# Patient Record
Sex: FEMALE | Race: BLACK OR AFRICAN AMERICAN | Employment: OTHER | URBAN - METROPOLITAN AREA
[De-identification: names, ages, dates, MRNs, and addresses within clinical notes are randomized per-mention and may not be internally consistent; named-entity substitution may affect disease eponyms.]

---

## 2017-01-14 ENCOUNTER — HOSPITAL ENCOUNTER (EMERGENCY)
Age: 47
Discharge: HOME | End: 2017-01-15
Attending: EMERGENCY MEDICINE
Payer: MEDICARE

## 2017-01-14 DIAGNOSIS — J44.1 COPD EXACERBATION (HCC): Primary | ICD-10-CM

## 2017-01-14 DIAGNOSIS — R07.9 ACUTE CHEST PAIN: ICD-10-CM

## 2017-01-14 DIAGNOSIS — J20.9 ACUTE BRONCHITIS, UNSPECIFIED ORGANISM: ICD-10-CM

## 2017-01-14 PROCEDURE — 99285 EMERGENCY DEPT VISIT HI MDM: CPT

## 2017-01-15 ENCOUNTER — APPOINTMENT (OUTPATIENT)
Dept: GENERAL RADIOLOGY | Age: 47
End: 2017-01-15
Attending: EMERGENCY MEDICINE
Payer: MEDICARE

## 2017-01-15 VITALS
HEIGHT: 67 IN | SYSTOLIC BLOOD PRESSURE: 104 MMHG | BODY MASS INDEX: 45.99 KG/M2 | TEMPERATURE: 98 F | OXYGEN SATURATION: 95 % | RESPIRATION RATE: 16 BRPM | WEIGHT: 293 LBS | DIASTOLIC BLOOD PRESSURE: 53 MMHG | HEART RATE: 91 BPM

## 2017-01-15 LAB
ALBUMIN SERPL BCP-MCNC: 3.1 G/DL (ref 3.5–5)
ALBUMIN/GLOB SERPL: 0.8 {RATIO} (ref 1.1–2.2)
ALP SERPL-CCNC: 86 U/L (ref 45–117)
ALT SERPL-CCNC: 33 U/L (ref 12–78)
ANION GAP BLD CALC-SCNC: 7 MMOL/L (ref 5–15)
AST SERPL W P-5'-P-CCNC: 12 U/L (ref 15–37)
BASOPHILS # BLD AUTO: 0 K/UL (ref 0–0.1)
BASOPHILS # BLD: 0 % (ref 0–1)
BILIRUB SERPL-MCNC: 0.4 MG/DL (ref 0.2–1)
BNP SERPL-MCNC: 175 PG/ML (ref 0–125)
BUN SERPL-MCNC: 26 MG/DL (ref 6–20)
BUN/CREAT SERPL: 35 (ref 12–20)
CALCIUM SERPL-MCNC: 10.1 MG/DL (ref 8.5–10.1)
CHLORIDE SERPL-SCNC: 101 MMOL/L (ref 97–108)
CK SERPL-CCNC: 29 U/L (ref 26–192)
CO2 SERPL-SCNC: 29 MMOL/L (ref 21–32)
CREAT SERPL-MCNC: 0.75 MG/DL (ref 0.55–1.02)
EOSINOPHIL # BLD: 0.1 K/UL (ref 0–0.4)
EOSINOPHIL NFR BLD: 0 % (ref 0–7)
ERYTHROCYTE [DISTWIDTH] IN BLOOD BY AUTOMATED COUNT: 14.1 % (ref 11.5–14.5)
GLOBULIN SER CALC-MCNC: 4.1 G/DL (ref 2–4)
GLUCOSE SERPL-MCNC: 72 MG/DL (ref 65–100)
HCT VFR BLD AUTO: 46.3 % (ref 35–47)
HGB BLD-MCNC: 14.4 G/DL (ref 11.5–16)
LYMPHOCYTES # BLD AUTO: 9 % (ref 12–49)
LYMPHOCYTES # BLD: 1.6 K/UL (ref 0.8–3.5)
MCH RBC QN AUTO: 28.7 PG (ref 26–34)
MCHC RBC AUTO-ENTMCNC: 31.1 G/DL (ref 30–36.5)
MCV RBC AUTO: 92.4 FL (ref 80–99)
MONOCYTES # BLD: 1 K/UL (ref 0–1)
MONOCYTES NFR BLD AUTO: 5 % (ref 5–13)
NEUTS SEG # BLD: 15.4 K/UL (ref 1.8–8)
NEUTS SEG NFR BLD AUTO: 86 % (ref 32–75)
PLATELET # BLD AUTO: 211 K/UL (ref 150–400)
POTASSIUM SERPL-SCNC: 4.6 MMOL/L (ref 3.5–5.1)
PROT SERPL-MCNC: 7.2 G/DL (ref 6.4–8.2)
RBC # BLD AUTO: 5.01 M/UL (ref 3.8–5.2)
SODIUM SERPL-SCNC: 137 MMOL/L (ref 136–145)
TROPONIN I SERPL-MCNC: <0.04 NG/ML
WBC # BLD AUTO: 18 K/UL (ref 3.6–11)

## 2017-01-15 PROCEDURE — 83880 ASSAY OF NATRIURETIC PEPTIDE: CPT | Performed by: EMERGENCY MEDICINE

## 2017-01-15 PROCEDURE — 93005 ELECTROCARDIOGRAM TRACING: CPT

## 2017-01-15 PROCEDURE — 74011250637 HC RX REV CODE- 250/637: Performed by: EMERGENCY MEDICINE

## 2017-01-15 PROCEDURE — A9270 NON-COVERED ITEM OR SERVICE: HCPCS | Performed by: EMERGENCY MEDICINE

## 2017-01-15 PROCEDURE — 74011636637 HC RX REV CODE- 636/637: Performed by: EMERGENCY MEDICINE

## 2017-01-15 PROCEDURE — 80053 COMPREHEN METABOLIC PANEL: CPT | Performed by: EMERGENCY MEDICINE

## 2017-01-15 PROCEDURE — 82550 ASSAY OF CK (CPK): CPT | Performed by: EMERGENCY MEDICINE

## 2017-01-15 PROCEDURE — 71020 XR CHEST PA LAT: CPT

## 2017-01-15 PROCEDURE — 85025 COMPLETE CBC W/AUTO DIFF WBC: CPT | Performed by: EMERGENCY MEDICINE

## 2017-01-15 PROCEDURE — 84484 ASSAY OF TROPONIN QUANT: CPT | Performed by: EMERGENCY MEDICINE

## 2017-01-15 PROCEDURE — 36415 COLL VENOUS BLD VENIPUNCTURE: CPT | Performed by: EMERGENCY MEDICINE

## 2017-01-15 RX ORDER — DOXYCYCLINE HYCLATE 100 MG
100 TABLET ORAL
Status: COMPLETED | OUTPATIENT
Start: 2017-01-15 | End: 2017-01-15

## 2017-01-15 RX ORDER — PREDNISONE 20 MG/1
60 TABLET ORAL
Status: COMPLETED | OUTPATIENT
Start: 2017-01-15 | End: 2017-01-15

## 2017-01-15 RX ORDER — HYDROCODONE POLISTIREX AND CHLORPHENIRAMINE POLISTIREX 10; 8 MG/5ML; MG/5ML
5 SUSPENSION, EXTENDED RELEASE ORAL
Status: COMPLETED | OUTPATIENT
Start: 2017-01-15 | End: 2017-01-15

## 2017-01-15 RX ORDER — OXYCODONE AND ACETAMINOPHEN 5; 325 MG/1; MG/1
1 TABLET ORAL
Status: DISCONTINUED | OUTPATIENT
Start: 2017-01-15 | End: 2017-01-15

## 2017-01-15 RX ORDER — DOXYCYCLINE HYCLATE 100 MG
100 TABLET ORAL 2 TIMES DAILY
Qty: 14 TAB | Refills: 0 | Status: SHIPPED | OUTPATIENT
Start: 2017-01-15 | End: 2017-01-22

## 2017-01-15 RX ORDER — HYDROCODONE POLISTIREX AND CHLORPHENIRAMINE POLISTIREX 10; 8 MG/5ML; MG/5ML
5 SUSPENSION, EXTENDED RELEASE ORAL
Qty: 60 ML | Refills: 0 | Status: SHIPPED | OUTPATIENT
Start: 2017-01-15 | End: 2017-06-11

## 2017-01-15 RX ORDER — PREDNISONE 20 MG/1
20 TABLET ORAL DAILY
Qty: 5 TAB | Refills: 0 | Status: SHIPPED | OUTPATIENT
Start: 2017-01-16 | End: 2017-01-21

## 2017-01-15 RX ADMIN — PREDNISONE 60 MG: 20 TABLET ORAL at 02:31

## 2017-01-15 RX ADMIN — HYDROCODONE POLISTIREX AND CHLORPHENIRAMINE POLISTIREX 5 ML: 10; 8 SUSPENSION, EXTENDED RELEASE ORAL at 02:31

## 2017-01-15 RX ADMIN — DOXYCYCLINE HYCLATE 100 MG: 100 TABLET, COATED ORAL at 02:31

## 2017-01-15 NOTE — DISCHARGE INSTRUCTIONS
Bronchitis: Care Instructions  Your Care Instructions    Bronchitis is inflammation of the bronchial tubes, which carry air to the lungs. The tubes swell and produce mucus, or phlegm. The mucus and inflamed bronchial tubes make you cough. You may have trouble breathing. Most cases of bronchitis are caused by viruses like those that cause colds. Antibiotics usually do not help and they may be harmful. Bronchitis usually develops rapidly and lasts about 2 to 3 weeks in otherwise healthy people. Follow-up care is a key part of your treatment and safety. Be sure to make and go to all appointments, and call your doctor if you are having problems. It's also a good idea to know your test results and keep a list of the medicines you take. How can you care for yourself at home? · Take all medicines exactly as prescribed. Call your doctor if you think you are having a problem with your medicine. · Get some extra rest.  · Take an over-the-counter pain medicine, such as acetaminophen (Tylenol), ibuprofen (Advil, Motrin), or naproxen (Aleve) to reduce fever and relieve body aches. Read and follow all instructions on the label. · Do not take two or more pain medicines at the same time unless the doctor told you to. Many pain medicines have acetaminophen, which is Tylenol. Too much acetaminophen (Tylenol) can be harmful. · Take an over-the-counter cough medicine that contains dextromethorphan to help quiet a dry, hacking cough so that you can sleep. Avoid cough medicines that have more than one active ingredient. Read and follow all instructions on the label. · Breathe moist air from a humidifier, hot shower, or sink filled with hot water. The heat and moisture will thin mucus so you can cough it out. · Do not smoke. Smoking can make bronchitis worse. If you need help quitting, talk to your doctor about stop-smoking programs and medicines. These can increase your chances of quitting for good.   When should you call for help? Call 911 anytime you think you may need emergency care. For example, call if:  · You have severe trouble breathing. Call your doctor now or seek immediate medical care if:  · You have new or worse trouble breathing. · You cough up dark brown or bloody mucus (sputum). · You have a new or higher fever. · You have a new rash. Watch closely for changes in your health, and be sure to contact your doctor if:  · You cough more deeply or more often, especially if you notice more mucus or a change in the color of your mucus. · You are not getting better as expected. Where can you learn more? Go to http://cassia-donte.info/. Enter H333 in the search box to learn more about \"Bronchitis: Care Instructions. \"  Current as of: May 23, 2016  Content Version: 11.1  © 3985-6803 [employer]. Care instructions adapted under license by [employer] (which disclaims liability or warranty for this information). If you have questions about a medical condition or this instruction, always ask your healthcare professional. [address] any warranty or liability for your use of this information. Chest Pain: Care Instructions  Your Care Instructions  There are many things that can cause chest pain. Some are not serious and will get better on their own in a few days. But some kinds of chest pain need more testing and treatment. Your doctor may have recommended a follow-up visit in the next 8 to 12 hours. If you are not getting better, you may need more tests or treatment. Even though your doctor has released you, you still need to watch for any problems. The doctor carefully checked you, but sometimes problems can develop later. If you have new symptoms or if your symptoms do not get better, get medical care right away.   If you have worse or different chest pain or pressure that lasts more than 5 minutes or you passed out (lost consciousness), call 911 or seek other emergency help right away. A medical visit is only one step in your treatment. Even if you feel better, you still need to do what your doctor recommends, such as going to all suggested follow-up appointments and taking medicines exactly as directed. This will help you recover and help prevent future problems. How can you care for yourself at home? · Rest until you feel better. · Take your medicine exactly as prescribed. Call your doctor if you think you are having a problem with your medicine. · Do not drive after taking a prescription pain medicine. When should you call for help? Call 911 if:  · You passed out (lost consciousness). · You have severe difficulty breathing. · You have symptoms of a heart attack. These may include:  ¨ Chest pain or pressure, or a strange feeling in your chest.  ¨ Sweating. ¨ Shortness of breath. ¨ Nausea or vomiting. ¨ Pain, pressure, or a strange feeling in your back, neck, jaw, or upper belly or in one or both shoulders or arms. ¨ Lightheadedness or sudden weakness. ¨ A fast or irregular heartbeat. After you call 911, the  may tell you to chew 1 adult-strength or 2 to 4 low-dose aspirin. Wait for an ambulance. Do not try to drive yourself. Call your doctor today if:  · You have any trouble breathing. · Your chest pain gets worse. · You are dizzy or lightheaded, or you feel like you may faint. · You are not getting better as expected. · You are having new or different chest pain. Where can you learn more? Go to http://cassia-donte.info/. Enter A120 in the search box to learn more about \"Chest Pain: Care Instructions. \"  Current as of: May 27, 2016  Content Version: 11.1  © 2258-0778 [email or web address]. Care instructions adapted under license by [employer] (which disclaims liability or warranty for this information).  If you have questions about a medical condition or this instruction, always ask your healthcare professional. [address] any warranty or liability for your use of this information.

## 2017-01-15 NOTE — ED PROVIDER NOTES
HPI Comments: Estelle Rocha is a 42 y.o. female with PMHx significant for asthma,CHF,COPD,MI,DM who presents via EMS to the ED with cc of SOB x 2 days. She also c/o left sided chest pain (that has recently shifted to the right), left arm tightness, diaphoresis, productive cough with white sputum production and wheezes. Pt reports that she thought her sxs were originally due to indigestion as she has been burping and passing gas frequently. She notes that she recently had a URI from which she is still recovering. She also notes chronic swelling of her bilateral lower extremities but states that this is at baseline and has not worsened. Pt states that a breathing treatment en route has already helped her breathing. She denies any fever, N/V/D.    PCP: None    Social Hx: +tobacco (1/2 PPD), -EtOH, -Illicit Drugs    There are no other complaints, changes, or physical findings at this time. The history is provided by the patient. Past Medical History:   Diagnosis Date    Arthritis     Asthma     CAD (coronary artery disease)     Congestive heart failure (HCC)     COPD (chronic obstructive pulmonary disease) ([address]     Diabetes ([address]     Hypertension     Morbid obesity with BMI of 70 and over, adult ([address]     NSTEMI (non-ST elevated myocardial infarction) ([address]     SVT (supraventricular tachycardia)        Past Surgical History:   Procedure Laterality Date    Hx orthopaedic      Hx other surgical       cyst removed from back    Hx  section      Pr cardiac surg procedure unlist       Stents         Family History:   Problem Relation Age of Onset    Heart Disease Mother     Heart Disease Father     Heart Disease Sister        Social History     Social History    Marital status: SINGLE     Spouse name: N/A    Number of children: N/A    Years of education: N/A     Occupational History    Not on file.      Social History Main Topics    Smoking status: Current Every Day Smoker Packs/day: 0.50     Types: Cigarettes    Smokeless tobacco: Never Used    Alcohol use No    Drug use: No    Sexual activity: Not on file     Other Topics Concern    Not on file     Social History Narrative    ** Merged History Encounter **              ALLERGIES: Review of patient's allergies indicates no known allergies. Review of Systems   Constitutional: Positive for diaphoresis. HENT: Negative for congestion. Eyes: Negative. Respiratory: Positive for cough, shortness of breath and wheezing. Cardiovascular: Positive for chest pain. Gastrointestinal: Negative for diarrhea, nausea and vomiting. Endocrine: Negative for heat intolerance. Genitourinary: Negative for dysuria. Musculoskeletal: Positive for myalgias (left arm). Skin: Negative for rash. Allergic/Immunologic: Negative for immunocompromised state. Neurological: Negative for dizziness. Hematological: Does not bruise/bleed easily. Psychiatric/Behavioral: Negative. All other systems reviewed and are negative. Vitals:    01/14/17 2338 01/14/17 2345 01/15/17 0030   BP: 113/77 113/77 104/53   Pulse: 91  91   Resp: 16     Temp: 98 °F (36.7 °C)     SpO2: 92% 92% 95%   Weight: (!) 201.9 kg (445 lb)     Height: 5' 7\" (1.702 m)              Physical Exam   Constitutional: She is oriented to person, place, and time. She appears well-developed and well-nourished. No distress. Severely elevated BMI    HENT:   Head: Normocephalic and atraumatic. Eyes: EOM are normal.   Neck: Normal range of motion. Neck supple. Cardiovascular: Normal rate, regular rhythm and normal heart sounds. Pulmonary/Chest: Effort normal. No respiratory distress. She has wheezes. She exhibits tenderness (reproducible). Mild scattered wheezes    Abdominal: Soft. Bowel sounds are normal. She exhibits no mass. There is no tenderness. Musculoskeletal: Normal range of motion. She exhibits edema and tenderness. Upper back tenderness.  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 Neurological: She is alert and oriented to person, place, and time. Coordination normal.   Skin: Skin is warm and dry. Psychiatric: She has a normal mood and affect. Nursing note and vitals reviewed. MDM  Number of Diagnoses or Management Options  Acute bronchitis, unspecified organism:   Acute chest pain:   COPD exacerbation [facility]):   Diagnosis management comments: Ddx: COPD, asthma, PNA, CHF, costochondritis, CAD       Amount and/or Complexity of Data Reviewed  Clinical lab tests: ordered and reviewed  Tests in the radiology section of CPT®: ordered and reviewed  Tests in the medicine section of CPT®: ordered and reviewed  Review and summarize past medical records: yes  Independent visualization of images, tracings, or specimens: yes    Patient Progress  Patient progress: stable    ED Course       Procedures    EKG interpretation: (Preliminary) 0032  Rhythm: normal sinus rhythm; and regular . Rate (approx.): 87; Axis: normal; IN interval: normal; QRS interval: low voltage QRS; ST/T wave: normal;  Other findings: no significant changes.     LABORATORY TESTS:  Recent Results (from the past 12 hour(s))   EKG, 12 LEAD, INITIAL    Collection Time: 01/15/17 12:32 AM   Result Value Ref Range    Ventricular Rate 87 BPM    Atrial Rate 87 BPM    P-R Interval 180 ms    QRS Duration 90 ms    Q-T Interval 344 ms    QTC Calculation (Bezet) 413 ms    Calculated P Axis 52 degrees    Calculated R Axis 86 degrees    Calculated T Axis 47 degrees    Diagnosis       Normal sinus rhythm  Low voltage QRS  When compared with ECG of 25-NOV-2016 20:38,  T wave amplitude has increased in Anterior leads     TROPONIN I    Collection Time: 01/15/17  1:05 AM   Result Value Ref Range    Troponin-I, Qt. <0.04 <0.05 ng/mL   CK W/ REFLX CKMB    Collection Time: 01/15/17  1:05 AM   Result Value Ref Range    CK 29 26 - 192 U/L   CBC WITH AUTOMATED DIFF    Collection Time: 01/15/17  1:05 AM   Result Value Ref Range    WBC 18.0 (H) 3.6 - 11.0 K/uL    RBC 5.01 3.80 - 5.20 M/uL    HGB 14.4 11.5 - 16.0 g/dL    HCT 46.3 35.0 - 47.0 %    MCV 92.4 80.0 - 99.0 FL    MCH 28.7 26.0 - 34.0 PG    MCHC 31.1 30.0 - 36.5 g/dL    RDW 14.1 11.5 - 14.5 %    PLATELET 850 779 - 855 K/uL    NEUTROPHILS 86 (H) 32 - 75 %    LYMPHOCYTES 9 (L) 12 - 49 %    MONOCYTES 5 5 - 13 %    EOSINOPHILS 0 0 - 7 %    BASOPHILS 0 0 - 1 %    ABS. NEUTROPHILS 15.4 (H) 1.8 - 8.0 K/UL    ABS. LYMPHOCYTES 1.6 0.8 - 3.5 K/UL    ABS. MONOCYTES 1.0 0.0 - 1.0 K/UL    ABS. EOSINOPHILS 0.1 0.0 - 0.4 K/UL    ABS. BASOPHILS 0.0 0.0 - 0.1 K/UL   METABOLIC PANEL, COMPREHENSIVE    Collection Time: 01/15/17  1:05 AM   Result Value Ref Range    Sodium 137 136 - 145 mmol/L    Potassium 4.6 3.5 - 5.1 mmol/L    Chloride 101 97 - 108 mmol/L    CO2 29 21 - 32 mmol/L    Anion gap 7 5 - 15 mmol/L    Glucose 72 65 - 100 mg/dL    BUN 26 (H) 6 - 20 MG/DL    Creatinine 0.75 0.55 - 1.02 MG/DL    BUN/Creatinine ratio 35 (H) 12 - 20      GFR est AA >60 >60 ml/min/1.73m2    GFR est non-AA >60 >60 ml/min/1.73m2    Calcium 10.1 8.5 - 10.1 MG/DL    Bilirubin, total 0.4 0.2 - 1.0 MG/DL    ALT 33 12 - 78 U/L    AST 12 (L) 15 - 37 U/L    Alk. phosphatase 86 45 - 117 U/L    Protein, total 7.2 6.4 - 8.2 g/dL    Albumin 3.1 (L) 3.5 - 5.0 g/dL    Globulin 4.1 (H) 2.0 - 4.0 g/dL    A-G Ratio 0.8 (L) 1.1 - 2.2     PRO-BNP    Collection Time: 01/15/17  1:05 AM   Result Value Ref Range    NT pro- (H) 0 - 125 PG/ML       IMAGING RESULTS:  XR CHEST PA LAT   Final ResultEXAM: XR CHEST PA LAT     INDICATION: Shortness of breath for a few days     COMPARISON: 11/25/2016.     FINDINGS: PA and lateral radiographs of the chest demonstrate there is a linear  density at the right lung base which may be scarring or atelectasis. Lungs are  otherwise clear. Thony Shillings Heart size is stable at the upper limits of normal.  Mediastinal shadows stable. . The bones and soft tissues are within normal  limits.    IMPRESSION  IMPRESSION: No acute finding             MEDICATIONS GIVEN:  Medications   predniSONE (DELTASONE) tablet 60 mg (60 mg Oral Given 1/15/17 0231)   chlorpheniramine-HYDROcodone (TUSSIONEX) oral suspension 5 mL (5 mL Oral Given 1/15/17 0231)   doxycycline (VIBRA-TABS) tablet 100 mg (100 mg Oral Given 1/15/17 0231)       IMPRESSION:  1. COPD exacerbation ([address]    2. Acute bronchitis, unspecified organism    3. Acute chest pain        PLAN:  1. Discharge Medication List as of 1/15/2017  3:20 AM      START taking these medications    Details   doxycycline (VIBRA-TABS) 100 mg tablet Take 1 Tab by mouth two (2) times a day for 7 days. , Normal, Disp-14 Tab, R-0      chlorpheniramine-HYDROcodone (TUSSIONEX PENNKINETIC ER) 10-8 mg/5 mL suspension Take 5 mL by mouth every twelve (12) hours as needed for Cough. Max Daily Amount: 10 mL., Print, Disp-60 mL, R-0      predniSONE (DELTASONE) 20 mg tablet Take 1 Tab by mouth daily for 5 days. With Breakfast, Normal, Disp-5 Tab, R-0         CONTINUE these medications which have NOT CHANGED    Details   furosemide (LASIX) 40 mg tablet Take 40 mg by mouth daily. , Historical Med      lovastatin (MEVACOR) 40 mg tablet Take 1 Tab by mouth nightly., Normal, Disp-30 Tab, R-6      albuterol (ACCUNEB) 1.25 mg/3 mL nebu Take 3 mL by inhalation every four (4) hours as needed. , Print, Disp-100 Each, R-1      amLODIPine (NORVASC) 5 mg tablet Take 0.5 Tabs by mouth daily. , Print, Disp-30 Tab, R-1      clotrimazole (LOTRIMIN) 1 % topical cream Apply to the affected areas twice daily until healed, Print, Disp-15 g, R-0      lisinopril (PRINIVIL, ZESTRIL) 40 mg tablet Take 40 mg by mouth daily. , Historical Med      glyBURIDE (DIABETA) 5 mg tablet Take 5 mg by mouth Daily (before breakfast). , Historical Med      benzonatate (TESSALON) 200 mg capsule Take 200 mg by mouth three (3) times daily as needed for Cough., Historical Med      cetirizine (ZYRTEC) 10 mg tablet Take 10 mg by mouth daily as needed for Allergies. , Historical Med      nystatin (MYCOSTATIN) powder Apply  to affected area three (3) times daily. APPLY TO Bilater breasts, pannus, groin after her bath and then two other times during the day., Print, Disp-1 Bottle, R-0      medroxyPROGESTERone (DEPO-PROVERA) 150 mg/mL injection 150 mg., Historical Med      omeprazole (PRILOSEC) 40 mg capsule Take 40 mg by mouth daily. , Historical Med      ketoconazole (NIZORAL) 2 % topical cream Apply  to affected area daily. , Historical Med      ammonium lactate (LAC-HYDRIN) 12 % topical cream rub in to affected area well, Print, Disp-280 g, R-1      aspirin delayed-release 81 mg tablet Take 81 mg by mouth daily. , Historical Med      metoprolol (LOPRESSOR) 25 mg tablet Take 25 mg by mouth two (2) times a day., Historical Med      nitroglycerin (NITROSTAT) 0.4 mg SL tablet 1 Tab by SubLINGual route every five (5) minutes as needed for Chest Pain (call 911 if not relieved by 3). , Normal, Disp-25 Tab, R-2      albuterol (PROVENTIL, VENTOLIN) 90 mcg/actuation inhaler Take 1-2 Puffs by inhalation every four (4) hours as needed for Wheezing., Print, Disp-17 g, R-0      cyclobenzaprine (FLEXERIL) 10 mg tablet Take 10 mg by mouth three (3) times daily as needed., Historical Med         STOP taking these medications       HYDROmorphone (DILAUDID) 2 mg tablet Comments:   Reason for Stoppin.   Follow-up Information     Follow up With Details Comments Contact Info    see a PCP or clinic Dr In 2 days As needed     [facility] EMERGENCY DEPT  If symptoms worsen [address] [facility] [address] Aditi Saleh MD In 2 days As needed [address]  [address]           Return to ED if worse     DISCHARGE NOTE  3:30 AM  The patient has been re-evaluated and is ready for discharge. Reviewed available results with patient. Counseled pt on diagnosis and care plan.  Pt has expressed understanding, and all questions have been answered. Pt agrees with plan and agrees to F/U as recommended, or return to the ED if their sxs worsen. Discharge instructions have been provided and explained to the pt, along with reasons to return to the ED. Written by Elvia Angela, ED Scribe, as dictated by David Keita MD.    This note is prepared by Elvia Angela, acting as Scribe for David Keita MD.    David Keita MD: The scribe's documentation has been prepared under my direction and personally reviewed by me in its entirety. I confirm that the note above accurately reflects all work, treatment, procedures, and medical decision making performed by me.

## 2017-01-15 NOTE — ED NOTES
Pt discharged with written instructions and prescriptions at this time. Pt verbalizes understanding and all questions were answered. Discharged by Patti Murphy, in stable condition, via [facility].

## 2017-01-16 LAB
ATRIAL RATE: 87 BPM
CALCULATED P AXIS, ECG09: 52 DEGREES
CALCULATED R AXIS, ECG10: 86 DEGREES
CALCULATED T AXIS, ECG11: 47 DEGREES
DIAGNOSIS, 93000: NORMAL
P-R INTERVAL, ECG05: 180 MS
Q-T INTERVAL, ECG07: 344 MS
QRS DURATION, ECG06: 90 MS
QTC CALCULATION (BEZET), ECG08: 413 MS
VENTRICULAR RATE, ECG03: 87 BPM

## 2017-06-11 ENCOUNTER — HOSPITAL ENCOUNTER (EMERGENCY)
Age: 47
Discharge: HOME | End: 2017-06-12
Attending: EMERGENCY MEDICINE | Admitting: EMERGENCY MEDICINE
Payer: MEDICARE

## 2017-06-11 ENCOUNTER — APPOINTMENT (OUTPATIENT)
Dept: GENERAL RADIOLOGY | Age: 47
End: 2017-06-11
Attending: EMERGENCY MEDICINE
Payer: MEDICARE

## 2017-06-11 DIAGNOSIS — J44.1 ACUTE EXACERBATION OF CHRONIC OBSTRUCTIVE PULMONARY DISEASE (COPD) (HCC): ICD-10-CM

## 2017-06-11 DIAGNOSIS — R06.09 DYSPNEA ON EXERTION: Primary | ICD-10-CM

## 2017-06-11 LAB
ALBUMIN SERPL BCP-MCNC: 3.1 G/DL (ref 3.5–5)
ALBUMIN/GLOB SERPL: 0.6 {RATIO} (ref 1.1–2.2)
ALP SERPL-CCNC: 107 U/L (ref 45–117)
ALT SERPL-CCNC: 17 U/L (ref 12–78)
ANION GAP BLD CALC-SCNC: 5 MMOL/L (ref 5–15)
ARTERIAL PATENCY WRIST A: YES
AST SERPL W P-5'-P-CCNC: 11 U/L (ref 15–37)
BASE EXCESS BLDA CALC-SCNC: 4.1 MMOL/L
BASOPHILS # BLD AUTO: 0 K/UL (ref 0–0.1)
BASOPHILS # BLD: 0 % (ref 0–1)
BDY SITE: ABNORMAL
BILIRUB SERPL-MCNC: 0.4 MG/DL (ref 0.2–1)
BNP SERPL-MCNC: 190 PG/ML (ref 0–125)
BREATHS.SPONTANEOUS ON VENT: 22
BUN SERPL-MCNC: 14 MG/DL (ref 6–20)
BUN/CREAT SERPL: 16 (ref 12–20)
CALCIUM SERPL-MCNC: 10.6 MG/DL (ref 8.5–10.1)
CHLORIDE SERPL-SCNC: 102 MMOL/L (ref 97–108)
CK SERPL-CCNC: 27 U/L (ref 26–192)
CO2 SERPL-SCNC: 30 MMOL/L (ref 21–32)
CREAT SERPL-MCNC: 0.85 MG/DL (ref 0.55–1.02)
EOSINOPHIL # BLD: 0.3 K/UL (ref 0–0.4)
EOSINOPHIL NFR BLD: 2 % (ref 0–7)
ERYTHROCYTE [DISTWIDTH] IN BLOOD BY AUTOMATED COUNT: 13.8 % (ref 11.5–14.5)
GAS FLOW.O2 O2 DELIVERY SYS: 6 L/MIN
GLOBULIN SER CALC-MCNC: 5 G/DL (ref 2–4)
GLUCOSE BLD STRIP.AUTO-MCNC: 94 MG/DL (ref 65–100)
GLUCOSE SERPL-MCNC: 84 MG/DL (ref 65–100)
HCO3 BLDA-SCNC: 31 MMOL/L (ref 22–26)
HCT VFR BLD AUTO: 41.5 % (ref 35–47)
HGB BLD-MCNC: 13.3 G/DL (ref 11.5–16)
LYMPHOCYTES # BLD AUTO: 11 % (ref 12–49)
LYMPHOCYTES # BLD: 1.8 K/UL (ref 0.8–3.5)
MCH RBC QN AUTO: 30.9 PG (ref 26–34)
MCHC RBC AUTO-ENTMCNC: 32 G/DL (ref 30–36.5)
MCV RBC AUTO: 96.5 FL (ref 80–99)
MONOCYTES # BLD: 1.2 K/UL (ref 0–1)
MONOCYTES NFR BLD AUTO: 8 % (ref 5–13)
NEUTS SEG # BLD: 12.2 K/UL (ref 1.8–8)
NEUTS SEG NFR BLD AUTO: 79 % (ref 32–75)
PCO2 BLDA: 57 MMHG (ref 35–45)
PH BLDA: 7.36 [PH] (ref 7.35–7.45)
PLATELET # BLD AUTO: 216 K/UL (ref 150–400)
PO2 BLDA: 95 MMHG (ref 80–100)
POTASSIUM SERPL-SCNC: 4.1 MMOL/L (ref 3.5–5.1)
PROT SERPL-MCNC: 8.1 G/DL (ref 6.4–8.2)
RBC # BLD AUTO: 4.3 M/UL (ref 3.8–5.2)
SAO2 % BLD: 97 % (ref 92–97)
SAO2% DEVICE SAO2% SENSOR NAME: ABNORMAL
SERVICE CMNT-IMP: NORMAL
SODIUM SERPL-SCNC: 137 MMOL/L (ref 136–145)
SPECIMEN SITE: ABNORMAL
TROPONIN I SERPL-MCNC: <0.04 NG/ML
WBC # BLD AUTO: 15.5 K/UL (ref 3.6–11)

## 2017-06-11 PROCEDURE — 36600 WITHDRAWAL OF ARTERIAL BLOOD: CPT | Performed by: EMERGENCY MEDICINE

## 2017-06-11 PROCEDURE — 99285 EMERGENCY DEPT VISIT HI MDM: CPT

## 2017-06-11 PROCEDURE — 74011000250 HC RX REV CODE- 250: Performed by: EMERGENCY MEDICINE

## 2017-06-11 PROCEDURE — 82550 ASSAY OF CK (CPK): CPT | Performed by: EMERGENCY MEDICINE

## 2017-06-11 PROCEDURE — 93005 ELECTROCARDIOGRAM TRACING: CPT

## 2017-06-11 PROCEDURE — 71020 XR CHEST PA LAT: CPT

## 2017-06-11 PROCEDURE — 77030029684 HC NEB SM VOL KT MONA -A

## 2017-06-11 PROCEDURE — 83880 ASSAY OF NATRIURETIC PEPTIDE: CPT | Performed by: EMERGENCY MEDICINE

## 2017-06-11 PROCEDURE — 36415 COLL VENOUS BLD VENIPUNCTURE: CPT | Performed by: EMERGENCY MEDICINE

## 2017-06-11 PROCEDURE — 84484 ASSAY OF TROPONIN QUANT: CPT | Performed by: EMERGENCY MEDICINE

## 2017-06-11 PROCEDURE — 85025 COMPLETE CBC W/AUTO DIFF WBC: CPT | Performed by: EMERGENCY MEDICINE

## 2017-06-11 PROCEDURE — 94640 AIRWAY INHALATION TREATMENT: CPT

## 2017-06-11 PROCEDURE — 82962 GLUCOSE BLOOD TEST: CPT

## 2017-06-11 PROCEDURE — 82803 BLOOD GASES ANY COMBINATION: CPT | Performed by: EMERGENCY MEDICINE

## 2017-06-11 PROCEDURE — 80053 COMPREHEN METABOLIC PANEL: CPT | Performed by: EMERGENCY MEDICINE

## 2017-06-11 RX ORDER — IPRATROPIUM BROMIDE AND ALBUTEROL SULFATE 2.5; .5 MG/3ML; MG/3ML
3 SOLUTION RESPIRATORY (INHALATION) ONCE
Status: COMPLETED | OUTPATIENT
Start: 2017-06-11 | End: 2017-06-11

## 2017-06-11 RX ORDER — SODIUM CHLORIDE 0.9 % (FLUSH) 0.9 %
5-10 SYRINGE (ML) INJECTION AS NEEDED
Status: DISCONTINUED | OUTPATIENT
Start: 2017-06-11 | End: 2017-06-12 | Stop reason: HOSPADM

## 2017-06-11 RX ORDER — SODIUM CHLORIDE 0.9 % (FLUSH) 0.9 %
5-10 SYRINGE (ML) INJECTION EVERY 8 HOURS
Status: DISCONTINUED | OUTPATIENT
Start: 2017-06-11 | End: 2017-06-12 | Stop reason: HOSPADM

## 2017-06-11 RX ADMIN — IPRATROPIUM BROMIDE AND ALBUTEROL SULFATE 3 ML: .5; 3 SOLUTION RESPIRATORY (INHALATION) at 23:05

## 2017-06-12 VITALS
SYSTOLIC BLOOD PRESSURE: 106 MMHG | RESPIRATION RATE: 20 BRPM | BODY MASS INDEX: 47.09 KG/M2 | TEMPERATURE: 98.8 F | DIASTOLIC BLOOD PRESSURE: 58 MMHG | OXYGEN SATURATION: 95 % | HEIGHT: 66 IN | WEIGHT: 293 LBS | HEART RATE: 100 BPM

## 2017-06-12 LAB
ATRIAL RATE: 97 BPM
CALCULATED P AXIS, ECG09: 70 DEGREES
CALCULATED R AXIS, ECG10: 74 DEGREES
CALCULATED T AXIS, ECG11: 50 DEGREES
CK MB CFR SERPL CALC: NORMAL % (ref 0–2.5)
CK MB SERPL-MCNC: <1 NG/ML (ref 5–25)
CK SERPL-CCNC: 28 U/L (ref 26–192)
DIAGNOSIS, 93000: NORMAL
P-R INTERVAL, ECG05: 180 MS
Q-T INTERVAL, ECG07: 342 MS
QRS DURATION, ECG06: 84 MS
QTC CALCULATION (BEZET), ECG08: 434 MS
TROPONIN I SERPL-MCNC: <0.04 NG/ML
VENTRICULAR RATE, ECG03: 97 BPM

## 2017-06-12 PROCEDURE — 84484 ASSAY OF TROPONIN QUANT: CPT | Performed by: EMERGENCY MEDICINE

## 2017-06-12 PROCEDURE — 82550 ASSAY OF CK (CPK): CPT | Performed by: EMERGENCY MEDICINE

## 2017-06-12 RX ORDER — ALBUTEROL SULFATE 0.83 MG/ML
2.5 SOLUTION RESPIRATORY (INHALATION)
Qty: 24 EACH | Refills: 0 | Status: ON HOLD | OUTPATIENT
Start: 2017-06-12 | End: 2018-02-03

## 2017-06-12 RX ORDER — AZITHROMYCIN 250 MG/1
TABLET, FILM COATED ORAL
Qty: 6 TAB | Refills: 0 | Status: SHIPPED | OUTPATIENT
Start: 2017-06-12 | End: 2017-07-24

## 2017-06-12 RX ORDER — PREDNISONE 10 MG/1
TABLET ORAL
Qty: 21 TAB | Refills: 0 | Status: ON HOLD | OUTPATIENT
Start: 2017-06-12 | End: 2017-07-27

## 2017-06-12 NOTE — ED TRIAGE NOTES
Assumed care of pt from EMS at this time, report received. Pt arrives with intermittent CP x this afternoon-first episode around 1330 and then again around 1930, radiating to L breast area with intermittent SOB. Pt with hx of COPD, wears 6L O2 continuously. Pt resting comfortably on the stretcher in a position of comfort.  Pt in no acute distress at this time.  Call bell within reach.  Side rails x 2.  Cardiac monitor x 3.  Stretcher locked in the lowest position.  Pt aware of plan to await for MD/PA-C/NP assessment, and pt/family verbalizes understanding.  Will continue to monitor.

## 2017-06-12 NOTE — ED NOTES
Assumed care of patient from [facility] during bedside report. Patient is resting on the edge of the bed, no complaints. IV saline locked. Oxygen at 6L NC. The patient denies chest pain or shortness of breath, denies dizziness or weakness. The patient denies nausea, vomiting, and diarrhea. Patient is up for reeval. MD notified. Patient is resting comfortably, bed in the lowest position, side rails raised, call bell in hand, lights dim. Instructed patient to not get up without assistance, and to ring the call bell for any questions or concerns. Updated patient on the plan of care.

## 2017-06-12 NOTE — ED NOTES
Pt returned from xray at this time. Pt insist's on sitting on edge of bed, as she is having leg cramping. Informed pt to call for assistance for getting up.

## 2017-06-12 NOTE — DISCHARGE INSTRUCTIONS
Chronic Obstructive Pulmonary Disease (COPD): Care Instructions  Your Care Instructions    Chronic obstructive pulmonary disease (COPD) is a general term for a group of lung diseases, including emphysema and chronic bronchitis. People with COPD have decreased airflow in and out of the lungs, which makes it hard to breathe. The airways also can get clogged with thick mucus. Cigarette smoking is a major cause of COPD. Although there is no cure for COPD, you can slow its progress. Following your treatment plan and taking care of yourself can help you feel better and live longer. Follow-up care is a key part of your treatment and safety. Be sure to make and go to all appointments, and call your doctor if you are having problems. It's also a good idea to know your test results and keep a list of the medicines you take. How can you care for yourself at home? Staying healthy  · Do not smoke. This is the most important step you can take to prevent more damage to your lungs. If you need help quitting, talk to your doctor about stop-smoking programs and medicines. These can increase your chances of quitting for good. · Avoid colds and flu. Get a pneumococcal vaccine shot. If you have had one before, ask your doctor whether you need a second dose. Get the flu vaccine every fall. If you must be around people with colds or the flu, wash your hands often. · Avoid secondhand smoke, air pollution, and high altitudes. Also avoid cold, dry air and hot, humid air. Stay at home with your windows closed when air pollution is bad. Medicines and oxygen therapy  · Take your medicines exactly as prescribed. Call your doctor if you think you are having a problem with your medicine. · You may be taking medicines such as:  ¨ Bronchodilators. These help open your airways and make breathing easier. Bronchodilators are either short-acting (work for 6 to 9 hours) or long-acting (work for 24 hours).  You inhale most bronchodilators, so they start to act quickly. Always carry your quick-relief inhaler with you in case you need it while you are away from home. ¨ Corticosteroids (prednisone, budesonide). These reduce airway inflammation. They come in pill or inhaled form. You must take these medicines every day for them to work well. · A spacer may help you get more inhaled medicine to your lungs. Ask your doctor or pharmacist if a spacer is right for you. If it is, ask how to use it properly. · Do not take any vitamins, over-the-counter medicine, or herbal products without talking to your doctor first.  · If your doctor prescribed antibiotics, take them as directed. Do not stop taking them just because you feel better. You need to take the full course of antibiotics. · Oxygen therapy boosts the amount of oxygen in your blood and helps you breathe easier. Use the flow rate your doctor has recommended, and do not change it without talking to your doctor first.  Activity  · Get regular exercise. Walking is an easy way to get exercise. Start out slowly, and walk a little more each day. · Pay attention to your breathing. You are exercising too hard if you cannot talk while you are exercising. · Take short rest breaks when doing household chores and other activities. · Learn breathing methods--such as breathing through pursed lips--to help you become less short of breath. · If your doctor has not set you up with a pulmonary rehabilitation program, talk to him or her about whether rehab is right for you. Rehab includes exercise programs, education about your disease and how to manage it, help with diet and other changes, and emotional support. Diet  · Eat regular, healthy meals. Use bronchodilators about 1 hour before you eat to make it easier to eat. Eat several small meals instead of three large ones. Drink beverages at the end of the meal. Avoid foods that are hard to chew.   · Eat foods that contain protein so that you do not lose muscle mass.  Mental health  · Talk to your family, friends, or a therapist about your feelings. It is normal to feel frightened, angry, hopeless, helpless, and even guilty. Talking openly about bad feelings can help you cope. If these feelings last, talk to your doctor. When should you call for help? Call 911 anytime you think you may need emergency care. For example, call if:  · You have severe trouble breathing. Call your doctor now or seek immediate medical care if:  · You have new or worse trouble breathing. · You cough up blood. · You have a fever. Watch closely for changes in your health, and be sure to contact your doctor if:  · You cough more deeply or more often, especially if you notice more mucus or a change in the color of your mucus. · You have new or worse swelling in your legs or belly. · You are not getting better as expected. Where can you learn more? Go to http://cassiaPocket Change Carddonte.info/. Ayleen Joiner in the search box to learn more about \"Chronic Obstructive Pulmonary Disease (COPD): Care Instructions. \"  Current as of: May 23, 2016  Content Version: 11.2  © 5450-0285 [employer]. Care instructions adapted under license by [employer] (which disclaims liability or warranty for this information). If you have questions about a medical condition or this instruction, always ask your healthcare professional. [place] [address] any warranty or liability for your use of this information. Shortness of Breath: Care Instructions  Your Care Instructions  Shortness of breath has many causes. Sometimes conditions such as anxiety can lead to shortness of breath. Some people get mild shortness of breath when they exercise. Trouble breathing also can be a symptom of a serious problem, such as asthma, lung disease, emphysema, heart problems, and pneumonia. If your shortness of breath continues, you may need tests and treatment.  Watch for any changes in your breathing and other symptoms. Follow-up care is a key part of your treatment and safety. Be sure to make and go to all appointments, and call your doctor if you are having problems. Its also a good idea to know your test results and keep a list of the medicines you take. How can you care for yourself at home? · Do not smoke or allow others to smoke around you. If you need help quitting, talk to your doctor about stop-smoking programs and medicines. These can increase your chances of quitting for good. · Get plenty of rest and sleep. · Take your medicines exactly as prescribed. Call your doctor if you think you are having a problem with your medicine. · Find healthy ways to deal with stress. ¨ Exercise daily. ¨ Get plenty of sleep. ¨ Eat regularly and well. When should you call for help? Call 911 anytime you think you may need emergency care. For example, call if:  · You have severe shortness of breath. · You have symptoms of a heart attack. These may include:  ¨ Chest pain or pressure, or a strange feeling in the chest.  ¨ Sweating. ¨ Shortness of breath. ¨ Nausea or vomiting. ¨ Pain, pressure, or a strange feeling in the back, neck, jaw, or upper belly or in one or both shoulders or arms. ¨ Lightheadedness or sudden weakness. ¨ A fast or irregular heartbeat. After you call 911, the  may tell you to chew 1 adult-strength or 2 to 4 low-dose aspirin. Wait for an ambulance. Do not try to drive yourself. Call your doctor now or seek immediate medical care if:  · Your shortness of breath gets worse or you start to wheeze. Wheezing is a high-pitched sound when you breathe. · You wake up at night out of breath or have to prop your head up on several pillows to breathe. · You are short of breath after only light activity or while at rest.  Watch closely for changes in your health, and be sure to contact your doctor if:  · You do not get better over the next 1 to 2 days.   Where can you learn more? Go to http://cassia-donte.info/. Enter S780 in the search box to learn more about \"Shortness of Breath: Care Instructions. \"  Current as of: May 23, 2016  Content Version: 11.2  © 7772-8078 [employer]. Care instructions adapted under license by "[employer]" (which disclaims liability or warranty for this information). If you have questions about a medical condition or this instruction, always ask your healthcare professional. [place] [address] any warranty or liability for your use of this information.

## 2017-06-12 NOTE — ED NOTES
Patient is awaiting discharge from [place] [address] [facility] paperwork filled out and on the clipboard

## 2017-06-12 NOTE — ED NOTES
Bedside and Verbal shift change report given to Emily Reynolds RN (oncoming nurse) by Bhavana Chu RN (offgoing nurse). Report included the following information SBAR, Kardex, ED Summary, [facility] and Recent Results.

## 2017-06-12 NOTE — ED NOTES
Patient given food and beverage at this time per request. Call light within reach. Patient in no sign of distress or discomfort.

## 2017-06-12 NOTE — ED NOTES
[facility] called at this time to set up medical transport. [facility] states they need to call back at this time for ETA.

## 2017-06-12 NOTE — ED PROVIDER NOTES
HPI Comments: Katherien Nielsen is a 41 y.o. female, pmhx DM / CHF / CAD / COPD / NSTEMI / asthma, who presents via EMS to the ED c/o progressively worsening diffuse L sided CP x 1 week. Pt reports an additional increase in belching, cough, and SOB x 1 week. Pt reports a hx of COPD for which she uses 6L NC at baseline. She states her CP began after her home erlin aid performed an ECHO on her and might have pressed too hard. Pt notes she has a CPAP at home, but states it is currently not functioning as there is a piece missing. She notes she has attempted to contact the , but they have been unable to replace her machine secondary to insurance issues. Pt states she still smokes daily and has a friend who brings her cigarettes. She notes she is ambulatory with a walker at baseline. Pt specifically denies any recent fever, chills, nausea, vomiting, diarrhea, abd pain, lightheadedness, dizziness, BLE swelling, HA, heart palpitations, urinary sxs, changes in BM, changes in PO intake, melena, hematochezia, or congestion. PCP: Dr. Barbara Floyd (visiting physicians - [phone])  Cardiology: Hazel Whitten    Allergies: NKDA  PMHx: Significant for asthma, arthritis, CHF, SVT, NSTEMI, CAD, COPD, DM  PSHx: Significant for orthopedic, , cardiac cath / stents  Social Hx: +tobacco, -EtOH, -Illicit Drugs    There are no other complaints, changes, or physical findings at this time. The history is provided by the patient and the EMS personnel.         Past Medical History:   Diagnosis Date    Arthritis     Asthma     CAD (coronary artery disease)     Congestive heart failure (HCC)     [facility]     Diabetes ([facility]     Hypertension     Morbid obesity with BMI of 70 and over, adult ([facility]     NSTEMI (non-ST elevated myocardial infarction) ([address]     SVT (supraventricular tachycardia) ([facility] [address] [facility]        Past Surgical History:   Procedure Laterality Date    CARDIAC SURG PROCEDURE UNLIST      Stents    HX  SECTION      HX ORTHOPAEDIC      HX OTHER SURGICAL      cyst removed from back         Family History:   Problem Relation Age of Onset    Heart Disease Mother     Heart Disease Father     Heart Disease Sister        Social History     Social History    Marital status: SINGLE     Spouse name: N/A    Number of children: N/A    Years of education: N/A     Occupational History    Not on file. Social History Main Topics    Smoking status: Current Every Day Smoker     Packs/day: 0.25     Types: Cigarettes    Smokeless tobacco: Never Used    Alcohol use No    Drug use: No    Sexual activity: Not on file     Other Topics Concern    Not on file     Social History Narrative    ** Merged History Encounter **              ALLERGIES: Review of patient's allergies indicates no known allergies. Review of Systems   Constitutional: Negative. Negative for chills and fever. Eyes: Negative. Respiratory: Positive for cough and shortness of breath. Cardiovascular: Positive for chest pain (L sided). Gastrointestinal: Negative for abdominal pain, nausea and vomiting. Endocrine: Negative. Genitourinary: Negative. Negative for difficulty urinating, dysuria and hematuria. Musculoskeletal: Negative. Skin: Negative. Allergic/Immunologic: Negative. Neurological: Negative. Psychiatric/Behavioral: Negative for suicidal ideas. All other systems reviewed and are negative. Vitals:    17 2123 17 2300 17 0001   BP: 102/71 120/50 106/58   Pulse: 98 96 100   Resp: 18 20    Temp: 98.8 °F (37.1 °C)     SpO2: 96% 96% 95%   Weight: (!) 201.9 kg (445 lb)     Height: 5' 6\" (1.676 m)              Physical Exam   Nursing note and vitals reviewed.   General appearance - morbidly obese, in mild distress  Eyes - pupils equal and reactive, extraocular eye movements intact  ENT - mucous membranes moist, pharynx normal without lesions  Neck - supple, no significant adenopathy; non-tender to palpation  Chest - no wheezes, rales or rhonchi; tenderness over the L sided anterior chest wall, tachypneic  Heart - normal rate and regular rhythm, S1 and S2 normal, no murmurs noted  Abdomen - soft, nontender, nondistended, no masses or organomegaly  Musculoskeletal - no joint tenderness, deformity or swelling; normal ROM  Extremities - peripheral pulses normal, no pedal edema  Skin - woody edema to BLE  Neurological - alert, oriented x3, normal speech, no focal findings or movement disorder noted  Written by Bessie Ruiz ED Scribe, as dictated by Jm Schrader MD      MDM  Number of Diagnoses or Management Options  Diagnosis management comments:   DDx: chest wall contusion, CHF exacerbation, PNA, COPD exacerbation, fluid overload       Amount and/or Complexity of Data Reviewed  Clinical lab tests: ordered and reviewed  Tests in the radiology section of CPT®: ordered and reviewed  Tests in the medicine section of CPT®: ordered and reviewed  Obtain history from someone other than the patient: yes (EMS)  Review and summarize past medical records: yes  Independent visualization of images, tracings, or specimens: yes          Procedures    EKG interpretation: (Preliminary) 2130  Rhythm: normal sinus rhythm. Rate (approx.): 97bpm; Axis: normal; Normal AK, QRS, QTc intervals; ST/T wave: no ischemic changes; Other findings: non-ischemic. Written by Bessie Ruiz ED Scribe, as dictated by Jm Schrader MD      10:35 PM  Pulse Oximetry Analysis - Abnormal 81% on 6L    Cardiac Monitor:   Rate: 98bpm   Rhythm: NSR     Progress note:  2:57 AM  Pt noted to be feeling better, ready for discharge. Updated pt and/or family on all final lab and imaging findings. Will follow up as instructed. All questions have been answered, pt voiced understanding and agreement with plan.  Specific return precautions provided as well as instructions to return to the ED should sx worsen at any time. Vital signs stable for discharge. Written by Bessie Ruiz ED Scribe, as dictated by Evette Villarreal MD    LABORATORY TESTS:  Recent Results (from the past 12 hour(s))   EKG, 12 LEAD, INITIAL    Collection Time: 06/11/17  9:22 PM   Result Value Ref Range    Ventricular Rate 97 BPM    Atrial Rate 97 BPM    P-R Interval 180 ms    QRS Duration 84 ms    Q-T Interval 342 ms    QTC Calculation (Bezet) 434 ms    Calculated P Axis 70 degrees    Calculated R Axis 74 degrees    Calculated T Axis 50 degrees    Diagnosis       Normal sinus rhythm  Low voltage QRS  Borderline ECG  When compared with ECG of 15-JOEY-2017 00:32,  No significant change was found     GLUCOSE, POC    Collection Time: 06/11/17  9:32 PM   Result Value Ref Range    Glucose (POC) 94 65 - 100 mg/dL    Performed by [facility])    CBC WITH AUTOMATED DIFF    Collection Time: 06/11/17  9:44 PM   Result Value Ref Range    WBC 15.5 (H) 3.6 - 11.0 K/uL    RBC 4.30 3.80 - 5.20 M/uL    HGB 13.3 11.5 - 16.0 g/dL    HCT 41.5 35.0 - 47.0 %    MCV 96.5 80.0 - 99.0 FL    MCH 30.9 26.0 - 34.0 PG    MCHC 32.0 30.0 - 36.5 g/dL    RDW 13.8 11.5 - 14.5 %    PLATELET 349 474 - 694 K/uL    NEUTROPHILS 79 (H) 32 - 75 %    LYMPHOCYTES 11 (L) 12 - 49 %    MONOCYTES 8 5 - 13 %    EOSINOPHILS 2 0 - 7 %    BASOPHILS 0 0 - 1 %    ABS. NEUTROPHILS 12.2 (H) 1.8 - 8.0 K/UL    ABS. LYMPHOCYTES 1.8 0.8 - 3.5 K/UL    ABS. MONOCYTES 1.2 (H) 0.0 - 1.0 K/UL    ABS. EOSINOPHILS 0.3 0.0 - 0.4 K/UL    ABS.  BASOPHILS 0.0 0.0 - 0.1 K/UL   METABOLIC PANEL, COMPREHENSIVE    Collection Time: 06/11/17  9:44 PM   Result Value Ref Range    Sodium 137 136 - 145 mmol/L    Potassium 4.1 3.5 - 5.1 mmol/L    Chloride 102 97 - 108 mmol/L    CO2 30 21 - 32 mmol/L    Anion gap 5 5 - 15 mmol/L    Glucose 84 65 - 100 mg/dL    BUN 14 6 - 20 MG/DL    Creatinine 0.85 0.55 - 1.02 MG/DL    BUN/Creatinine ratio 16 12 - 20      GFR est AA >60 >60 ml/min/1.73m2    GFR est non-AA >60 >60 ml/min/1.73m2    Calcium 10.6 (H) 8.5 - 10.1 MG/DL    Bilirubin, total 0.4 0.2 - 1.0 MG/DL    ALT (SGPT) 17 12 - 78 U/L    AST (SGOT) 11 (L) 15 - 37 U/L    Alk. phosphatase 107 45 - 117 U/L    Protein, total 8.1 6.4 - 8.2 g/dL    Albumin 3.1 (L) 3.5 - 5.0 g/dL    Globulin 5.0 (H) 2.0 - 4.0 g/dL    A-G Ratio 0.6 (L) 1.1 - 2.2     CK W/ REFLX CKMB    Collection Time: 06/11/17  9:44 PM   Result Value Ref Range    CK 27 26 - 192 U/L   TROPONIN I    Collection Time: 06/11/17  9:44 PM   Result Value Ref Range    Troponin-I, Qt. <0.04 <0.05 ng/mL   PRO-BNP    Collection Time: 06/11/17  9:44 PM   Result Value Ref Range    NT pro- (H) 0 - 125 PG/ML   BLOOD GAS, ARTERIAL    Collection Time: 06/11/17 11:02 PM   Result Value Ref Range    pH 7.36 7.35 - 7.45      PCO2 57 (H) 35.0 - 45.0 mmHg    PO2 95 80 - 100 mmHg    O2 SAT 97 92 - 97 %    BICARBONATE 31 (H) 22 - 26 mmol/L    BASE EXCESS 4.1 mmol/L    O2 METHOD NASAL O2      O2 FLOW RATE 6.00 L/min    SPONTANEOUS RATE 22.0      Sample source ARTERIAL      SITE LEFT RADIAL      PARUL'S TEST YES     CK W/ CKMB & INDEX    Collection Time: 06/12/17  1:24 AM   Result Value Ref Range    CK 28 26 - 192 U/L    CK - MB <1.0 <3.6 NG/ML    CK-MB Index Cannot be calulated 0 - 2.5     TROPONIN I    Collection Time: 06/12/17  1:24 AM   Result Value Ref Range    Troponin-I, Qt. <0.04 <0.05 ng/mL       IMAGING RESULTS:     CXR Results  (Last 48 hours)               06/11/17 2224  XR CHEST PA LAT Final result    Impression:  IMPRESSION:   No acute process. Narrative:  INDICATION:   CP, SOB       COMPARISON: 1/15/17       FINDINGS:       Frontal and lateral views of the chest demonstrate a normal cardiomediastinal   silhouette. The lungs are adequately expanded. There is no edema, effusion,   consolidation, or pneumothorax. The osseous structures are unremarkable.                         MEDICATIONS GIVEN:  Medications   sodium chloride (NS) flush 5-10 mL (not administered) sodium chloride (NS) flush 5-10 mL (not administered)   albuterol-ipratropium (DUO-NEB) 2.5 MG-0.5 MG/3 ML (3 mL Nebulization Given 17 1493)       IMPRESSION:  1. Dyspnea on exertion    2. Acute exacerbation of chronic obstructive pulmonary disease (COPD) ([facility] [facility]        PLAN:  1. Current Discharge Medication List      START taking these medications    Details   predniSONE (STERAPRED DS) 10 mg dose pack As directed  Qty: 21 Tab, Refills: 0      azithromycin (ZITHROMAX Z-BIANCA) 250 mg tablet As directed  Qty: 6 Tab, Refills: 0      albuterol (PROVENTIL VENTOLIN) 2.5 mg /3 mL (0.083 %) nebulizer solution 3 mL by Nebulization route every four (4) hours as needed for Wheezing. Qty: 24 Each, Refills: 0         STOP taking these medications       albuterol (ACCUNEB) 1.25 mg/3 mL nebu Comments:   Reason for Stoppin.   Follow-up Information     Follow up With Details Comments Contact Info    Partha Schmidt MD Schedule an appointment as soon as possible for a visit  [address]  [phone]      [facility] EMERGENCY DEPT  If symptoms worsen [address]  [address] [facility]  [phone]        Return to ED if worse     DISCHARGE NOTE:  2:58 AM  The patient's results have been reviewed with family and/or caregiver. They verbally convey their understanding and agreement of the patient's signs, symptoms, diagnosis, treatment, and prognosis. They additionally agree to follow up as recommended in the discharge instructions or to return to the Emergency Room should the patient's condition change prior to their follow-up appointment. The family and/or caregiver verbally agrees with the care-plan and all of their questions have been answered.  The discharge instructions have also been provided to the them along with educational information regarding the patient's diagnosis and a list of reasons why the patient would want to return to the ER prior to their follow-up appointment should their condition change. Written by Lakhwinder Nicole, ED Scribe, as dictated by Álvaro Vance MD.         This note is prepared by Lakhwinder Nicole, acting as Scribe for MD Evette Wade MD : The scribe's documentation has been prepared under my direction and personally reviewed by me in its entirety. I confirm that the note above accurately reflects all work, treatment, procedures, and medical decision making performed by me. This note will not be viewable in [address]

## 2017-06-16 ENCOUNTER — HOSPITAL ENCOUNTER (EMERGENCY)
Age: 47
Discharge: HOME | End: 2017-06-16
Attending: EMERGENCY MEDICINE
Payer: MEDICARE

## 2017-06-16 ENCOUNTER — APPOINTMENT (OUTPATIENT)
Dept: GENERAL RADIOLOGY | Age: 47
End: 2017-06-16
Attending: EMERGENCY MEDICINE
Payer: MEDICARE

## 2017-06-16 VITALS
WEIGHT: 293 LBS | DIASTOLIC BLOOD PRESSURE: 62 MMHG | TEMPERATURE: 97.8 F | OXYGEN SATURATION: 96 % | HEART RATE: 90 BPM | HEIGHT: 66 IN | SYSTOLIC BLOOD PRESSURE: 133 MMHG | RESPIRATION RATE: 20 BRPM | BODY MASS INDEX: 47.09 KG/M2

## 2017-06-16 DIAGNOSIS — J44.1 ACUTE EXACERBATION OF CHRONIC OBSTRUCTIVE PULMONARY DISEASE (COPD) (HCC): Primary | ICD-10-CM

## 2017-06-16 LAB
ALBUMIN SERPL BCP-MCNC: 3 G/DL (ref 3.5–5)
ALBUMIN/GLOB SERPL: 0.6 {RATIO} (ref 1.1–2.2)
ALP SERPL-CCNC: 100 U/L (ref 45–117)
ALT SERPL-CCNC: 17 U/L (ref 12–78)
ANION GAP BLD CALC-SCNC: 3 MMOL/L (ref 5–15)
AST SERPL W P-5'-P-CCNC: 10 U/L (ref 15–37)
BASOPHILS # BLD AUTO: 0 K/UL (ref 0–0.1)
BASOPHILS # BLD: 0 % (ref 0–1)
BILIRUB SERPL-MCNC: 0.4 MG/DL (ref 0.2–1)
BNP SERPL-MCNC: 225 PG/ML (ref 0–125)
BUN SERPL-MCNC: 11 MG/DL (ref 6–20)
BUN/CREAT SERPL: 14 (ref 12–20)
CALCIUM SERPL-MCNC: 10.3 MG/DL (ref 8.5–10.1)
CHLORIDE SERPL-SCNC: 104 MMOL/L (ref 97–108)
CK MB CFR SERPL CALC: ABNORMAL % (ref 0–2.5)
CK MB SERPL-MCNC: <1 NG/ML (ref 5–25)
CK SERPL-CCNC: 25 U/L (ref 26–192)
CO2 SERPL-SCNC: 32 MMOL/L (ref 21–32)
CREAT SERPL-MCNC: 0.79 MG/DL (ref 0.55–1.02)
EOSINOPHIL # BLD: 0.2 K/UL (ref 0–0.4)
EOSINOPHIL NFR BLD: 2 % (ref 0–7)
ERYTHROCYTE [DISTWIDTH] IN BLOOD BY AUTOMATED COUNT: 14 % (ref 11.5–14.5)
GLOBULIN SER CALC-MCNC: 4.7 G/DL (ref 2–4)
GLUCOSE SERPL-MCNC: 93 MG/DL (ref 65–100)
HCT VFR BLD AUTO: 40.9 % (ref 35–47)
HGB BLD-MCNC: 12.7 G/DL (ref 11.5–16)
LYMPHOCYTES # BLD AUTO: 9 % (ref 12–49)
LYMPHOCYTES # BLD: 1.3 K/UL (ref 0.8–3.5)
MCH RBC QN AUTO: 29.7 PG (ref 26–34)
MCHC RBC AUTO-ENTMCNC: 31.1 G/DL (ref 30–36.5)
MCV RBC AUTO: 95.6 FL (ref 80–99)
MONOCYTES # BLD: 0.7 K/UL (ref 0–1)
MONOCYTES NFR BLD AUTO: 5 % (ref 5–13)
NEUTS SEG # BLD: 12 K/UL (ref 1.8–8)
NEUTS SEG NFR BLD AUTO: 84 % (ref 32–75)
PLATELET # BLD AUTO: 226 K/UL (ref 150–400)
POTASSIUM SERPL-SCNC: 4.2 MMOL/L (ref 3.5–5.1)
PROT SERPL-MCNC: 7.7 G/DL (ref 6.4–8.2)
RBC # BLD AUTO: 4.28 M/UL (ref 3.8–5.2)
SODIUM SERPL-SCNC: 139 MMOL/L (ref 136–145)
TROPONIN I SERPL-MCNC: <0.04 NG/ML
WBC # BLD AUTO: 14.3 K/UL (ref 3.6–11)

## 2017-06-16 PROCEDURE — 74011000250 HC RX REV CODE- 250: Performed by: EMERGENCY MEDICINE

## 2017-06-16 PROCEDURE — A9270 NON-COVERED ITEM OR SERVICE: HCPCS | Performed by: EMERGENCY MEDICINE

## 2017-06-16 PROCEDURE — 36415 COLL VENOUS BLD VENIPUNCTURE: CPT | Performed by: EMERGENCY MEDICINE

## 2017-06-16 PROCEDURE — 74011250637 HC RX REV CODE- 250/637: Performed by: EMERGENCY MEDICINE

## 2017-06-16 PROCEDURE — 94640 AIRWAY INHALATION TREATMENT: CPT

## 2017-06-16 PROCEDURE — 83880 ASSAY OF NATRIURETIC PEPTIDE: CPT | Performed by: EMERGENCY MEDICINE

## 2017-06-16 PROCEDURE — 85025 COMPLETE CBC W/AUTO DIFF WBC: CPT | Performed by: EMERGENCY MEDICINE

## 2017-06-16 PROCEDURE — 77010033678 HC OXYGEN DAILY

## 2017-06-16 PROCEDURE — 71020 XR CHEST PA LAT: CPT

## 2017-06-16 PROCEDURE — 84484 ASSAY OF TROPONIN QUANT: CPT | Performed by: EMERGENCY MEDICINE

## 2017-06-16 PROCEDURE — 82550 ASSAY OF CK (CPK): CPT | Performed by: EMERGENCY MEDICINE

## 2017-06-16 PROCEDURE — 93005 ELECTROCARDIOGRAM TRACING: CPT

## 2017-06-16 PROCEDURE — 80053 COMPREHEN METABOLIC PANEL: CPT | Performed by: EMERGENCY MEDICINE

## 2017-06-16 PROCEDURE — 99285 EMERGENCY DEPT VISIT HI MDM: CPT

## 2017-06-16 PROCEDURE — 74011636637 HC RX REV CODE- 636/637: Performed by: EMERGENCY MEDICINE

## 2017-06-16 PROCEDURE — 77030029684 HC NEB SM VOL KT MONA -A

## 2017-06-16 RX ORDER — ACETAMINOPHEN 325 MG/1
650 TABLET ORAL
Status: COMPLETED | OUTPATIENT
Start: 2017-06-16 | End: 2017-06-16

## 2017-06-16 RX ORDER — IPRATROPIUM BROMIDE AND ALBUTEROL SULFATE 2.5; .5 MG/3ML; MG/3ML
3 SOLUTION RESPIRATORY (INHALATION)
Status: COMPLETED | OUTPATIENT
Start: 2017-06-16 | End: 2017-06-16

## 2017-06-16 RX ORDER — PREDNISONE 20 MG/1
60 TABLET ORAL
Status: COMPLETED | OUTPATIENT
Start: 2017-06-16 | End: 2017-06-16

## 2017-06-16 RX ORDER — SODIUM CHLORIDE 0.9 % (FLUSH) 0.9 %
5-10 SYRINGE (ML) INJECTION AS NEEDED
Status: DISCONTINUED | OUTPATIENT
Start: 2017-06-16 | End: 2017-06-16 | Stop reason: HOSPADM

## 2017-06-16 RX ADMIN — IPRATROPIUM BROMIDE AND ALBUTEROL SULFATE 3 ML: .5; 3 SOLUTION RESPIRATORY (INHALATION) at 12:02

## 2017-06-16 RX ADMIN — IPRATROPIUM BROMIDE AND ALBUTEROL SULFATE 3 ML: .5; 3 SOLUTION RESPIRATORY (INHALATION) at 11:20

## 2017-06-16 RX ADMIN — ACETAMINOPHEN 650 MG: 325 TABLET, FILM COATED ORAL at 12:01

## 2017-06-16 RX ADMIN — PREDNISONE 60 MG: 20 TABLET ORAL at 14:25

## 2017-06-16 NOTE — ED TRIAGE NOTES
Pt is on oxygen 6lpm concentrator at home allthe time. She is on 5 lpm nasal cannula portable tank in triage now.

## 2017-06-16 NOTE — ED NOTES
She has not been able to get her meds filled from being here on Monday. She got someone to drop those off yesterday. She also has a nebulizer not functioning at home.

## 2017-06-16 NOTE — DISCHARGE INSTRUCTIONS
Chronic Obstructive Pulmonary Disease (COPD): Care Instructions  Your Care Instructions    Chronic obstructive pulmonary disease (COPD) is a general term for a group of lung diseases, including emphysema and chronic bronchitis. People with COPD have decreased airflow in and out of the lungs, which makes it hard to breathe. The airways also can get clogged with thick mucus. Cigarette smoking is a major cause of COPD. Although there is no cure for COPD, you can slow its progress. Following your treatment plan and taking care of yourself can help you feel better and live longer. Follow-up care is a key part of your treatment and safety. Be sure to make and go to all appointments, and call your doctor if you are having problems. It's also a good idea to know your test results and keep a list of the medicines you take. How can you care for yourself at home? Staying healthy  · Do not smoke. This is the most important step you can take to prevent more damage to your lungs. If you need help quitting, talk to your doctor about stop-smoking programs and medicines. These can increase your chances of quitting for good. · Avoid colds and flu. Get a pneumococcal vaccine shot. If you have had one before, ask your doctor whether you need a second dose. Get the flu vaccine every fall. If you must be around people with colds or the flu, wash your hands often. · Avoid secondhand smoke, air pollution, and high altitudes. Also avoid cold, dry air and hot, humid air. Stay at home with your windows closed when air pollution is bad. Medicines and oxygen therapy  · Take your medicines exactly as prescribed. Call your doctor if you think you are having a problem with your medicine. · You may be taking medicines such as:  ¨ Bronchodilators. These help open your airways and make breathing easier. Bronchodilators are either short-acting (work for 6 to 9 hours) or long-acting (work for 24 hours).  You inhale most bronchodilators, so they start to act quickly. Always carry your quick-relief inhaler with you in case you need it while you are away from home. ¨ Corticosteroids (prednisone, budesonide). These reduce airway inflammation. They come in pill or inhaled form. You must take these medicines every day for them to work well. · A spacer may help you get more inhaled medicine to your lungs. Ask your doctor or pharmacist if a spacer is right for you. If it is, ask how to use it properly. · Do not take any vitamins, over-the-counter medicine, or herbal products without talking to your doctor first.  · If your doctor prescribed antibiotics, take them as directed. Do not stop taking them just because you feel better. You need to take the full course of antibiotics. · Oxygen therapy boosts the amount of oxygen in your blood and helps you breathe easier. Use the flow rate your doctor has recommended, and do not change it without talking to your doctor first.  Activity  · Get regular exercise. Walking is an easy way to get exercise. Start out slowly, and walk a little more each day. · Pay attention to your breathing. You are exercising too hard if you cannot talk while you are exercising. · Take short rest breaks when doing household chores and other activities. · Learn breathing methods--such as breathing through pursed lips--to help you become less short of breath. · If your doctor has not set you up with a pulmonary rehabilitation program, talk to him or her about whether rehab is right for you. Rehab includes exercise programs, education about your disease and how to manage it, help with diet and other changes, and emotional support. Diet  · Eat regular, healthy meals. Use bronchodilators about 1 hour before you eat to make it easier to eat. Eat several small meals instead of three large ones. Drink beverages at the end of the meal. Avoid foods that are hard to chew.   · Eat foods that contain protein so that you do not lose muscle mass.  Mental health  · Talk to your family, friends, or a therapist about your feelings. It is normal to feel frightened, angry, hopeless, helpless, and even guilty. Talking openly about bad feelings can help you cope. If these feelings last, talk to your doctor. When should you call for help? Call 911 anytime you think you may need emergency care. For example, call if:  · You have severe trouble breathing. Call your doctor now or seek immediate medical care if:  · You have new or worse trouble breathing. · You cough up blood. · You have a fever. Watch closely for changes in your health, and be sure to contact your doctor if:  · You cough more deeply or more often, especially if you notice more mucus or a change in the color of your mucus. · You have new or worse swelling in your legs or belly. · You are not getting better as expected. Where can you learn more? Go to http://cassia-donte.info/. Enzo Coello in the search box to learn more about \"Chronic Obstructive Pulmonary Disease (COPD): Care Instructions. \"  Current as of: May 23, 2016  Content Version: 11.2  © 7728-0556 [email or web address]. Care instructions adapted under license by [employer] (which disclaims liability or warranty for this information). If you have questions about a medical condition or this instruction, always ask your healthcare professional. [address] any warranty or liability for your use of this information.

## 2017-06-16 NOTE — ED NOTES
Patient ambualtory to restroom and was unsuccessful providing a urine sample because she missed the urine cup.

## 2017-06-16 NOTE — PROGRESS NOTES
Patient requesting EMS transport home. Patient is oxygen dependent on 6L O2 via NC continuously at home. Patient states she has in-home personal health aides. Patient confirms she is going to [address] [place] [address] [address] [place], [place] [id number] with 5 steps to enter home. Son should be present at home upon patient's arrival.  Patient has visiting physician as her PCP - she states she sees him at least monthly. [employer] Reference # [id number].      Zeinab Schrader RN, BSN, [facility]  ED Case Manager  [phone]

## 2017-06-16 NOTE — ED NOTES
Patient presents with complaint of chest pain and shortness of breath. Patient reports being seen in this ED on Sunday and she was unable to fill her prescriptions.

## 2017-06-16 NOTE — ED NOTES
Dr Jack Santizo reviewed discharge instructions with the patient. The patient verbalized understanding. Pt discharged home via EMS transport, report given to EMS staff. Pt stable.

## 2017-06-16 NOTE — ED PROVIDER NOTES
HPI Comments: True Jimenze is a 41 y.o. female with PMHx of COPD, asthma, CHF, NSTEMI, CAD, morbid obesity, and DM, and PSHx of cardiac stent placement, presenting per EMS to ED c/o SOB for the past four days. She notes associated left-sided CP \"discomfort\" that is TTP and non-radiating, worse with any movement, as well as cough productive of non-bloody beige sputum. Pt reports she was seen for COPD exacerbation on 6/11/17 and rx'd azithromycin and prednisone. She endorses she has been unable to obtain the rx for the medications because she does not drive and has to depend on her home health nurses. Pt reports her nurse was able to  the rx last night but she states she has not taken any dose yet. She additionally notes her \"neb is messed up\" and she is attempting to resolve that issue. Pt reports she has had the left CP discomfort since having a home echo completed, noting the tech pressed hard into the area of current pain with the machine during the procedure. She reports history of COPD for which she is on home O2 6L NC at baseline and denies recent increase with symptoms. Pt states she does not remember when her most recent hospitalization secondary to COPD exacerbation was but notes she was hospitalized in [facility] of this year for PNA. She denies history of blood clots, estrogen therapy, and recent long travel/surgeries. Pt denies fever, chills, abdominal pain, N/V/D, and urinary symptoms. PCP: None  Social Hx: current every day smoker (0.25 ppd); - EtOH; - drug use. There are no other complaints, changes, or physical findings at this time. Written by SHRUTHI Pryor, as dictated by Phil Quintero MD.        The history is provided by the patient and the EMS personnel.         Past Medical History:   Diagnosis Date    Arthritis     Asthma     CAD (coronary artery disease)     Congestive heart failure (HCC)     COPD [facility]ronic obstructive [facility] disease) [facility] [address]  Diabetes ([facility]     Hypertension     Morbid obesity with BMI of 70 and over, adult ([facility]     NSTEMI (non-ST elevated myocardial infarction) ([facility]     SVT (supraventricular tachycardia) ([facility]        Past Surgical History:   Procedure Laterality Date    CARDIAC SURG PROCEDURE UNLIST      Stents    HX  SECTION      HX ORTHOPAEDIC      HX OTHER SURGICAL      cyst removed from back         Family History:   Problem Relation Age of Onset    Heart Disease Mother     Heart Disease Father     Heart Disease Sister        Social History     Social History    Marital status: SINGLE     Spouse name: N/A    Number of children: N/A    Years of education: N/A     Occupational History    Not on file. Social History Main Topics    Smoking status: Current Every Day Smoker     Packs/day: 0.25     Types: Cigarettes    Smokeless tobacco: Never Used    Alcohol use No    Drug use: No    Sexual activity: Not on file     Other Topics Concern    Not on file     Social History Narrative    ** Merged History Encounter **              ALLERGIES: Review of patient's allergies indicates no known allergies. Review of Systems   Constitutional: Negative for chills, fatigue and fever. HENT: Negative for congestion, rhinorrhea and sore throat. Eyes: Negative for pain, discharge and visual disturbance. Respiratory: Positive for cough (productive) and shortness of breath. Negative for chest tightness and wheezing. Cardiovascular: Positive for chest pain (left, TTP, non-radiating). Negative for palpitations and leg swelling. Gastrointestinal: Negative for abdominal pain, constipation, diarrhea, nausea and vomiting. Genitourinary: Negative for dysuria, frequency and hematuria. Musculoskeletal: Negative for arthralgias, back pain and myalgias. Skin: Negative for rash. Neurological: Negative for dizziness, weakness, light-headedness and headaches. Psychiatric/Behavioral: Negative.     All other systems reviewed and are negative. Vitals:    06/16/17 1015 06/16/17 1312 06/16/17 1315   BP: 126/79 140/82 139/76   Pulse: 90 100 89   Resp: 22     Temp: 97.8 °F (36.6 °C)     SpO2: 92% 98% 100%   Weight: (!) 201.9 kg (445 lb)     Height: 5' 6\" (1.676 m)              Physical Exam   Constitutional: She is oriented to person, place, and time. She appears well-developed and well-nourished. No distress. + obese;   HENT:   Head: Normocephalic and atraumatic. Eyes: EOM are normal. Right eye exhibits no discharge. Left eye exhibits no discharge. No scleral icterus. Neck: Normal range of motion. Neck supple. No tracheal deviation present. Cardiovascular: Normal rate, regular rhythm, normal heart sounds and intact distal pulses. Exam reveals no gallop and no friction rub. No murmur heard. Pulmonary/Chest: Effort normal. No respiratory distress. She has wheezes (scattered). She has no rales. She exhibits tenderness (left anterior). Decreased breath sounds; coarse breath sounds in inferior lung fields; Abdominal: Soft. She exhibits no distension. There is no tenderness. Musculoskeletal: Normal range of motion. + lymphedema to BLE's;   Lymphadenopathy:     She has no cervical adenopathy. Neurological: She is alert and oriented to person, place, and time. No focal neuro deficits   Skin: Skin is warm and dry. No rash noted. Psychiatric: She has a normal mood and affect. Nursing note and vitals reviewed. MDM  Number of Diagnoses or Management Options  Acute exacerbation of chronic obstructive pulmonary disease (COPD) [facility]):   Diagnosis management comments:     Patient presents to ED with acute COPD exacerbation. She was recently seen for the same but has been unable to take prescribed prednisone and azithromycin. She is maintaining oxygen saturation on home 6 L. Differential includes COPD exacerbation, bronchitis, pneumonia, heart failure, pleural effusion, ACS. Doubt PE.   Left anterior chest wall is tender with palpation and likely due to MSK pain. - CBC, CMP, Nina, BNP  - CXR  - Duo-nebs and reevaluate         Amount and/or Complexity of Data Reviewed  Clinical lab tests: ordered and reviewed  Tests in the radiology section of CPT®: ordered and reviewed  Tests in the medicine section of CPT®: ordered and reviewed  Obtain history from someone other than the patient: yes (EMS)  Review and summarize past medical records: yes  Independent visualization of images, tracings, or specimens: yes    Patient Progress  Patient progress: stable    Procedures    EKG interpretation: (Preliminary) 1021  Rhythm: occasional PVC's; and regular . Rate (approx.): 93; Axis: normal; DC interval: normal; QRS interval: normal ; ST/T wave: normal.  Written by SHRUTHI Pizarroibe, as dictated by Demetrius Betancourt MD.     Progress Note:  1:34 PM  Pt re-evaluated. She reports she is feeling better s/p breathing treatments. Written by Cari Gregory ED Scribe, as dictated by Demetrius Betancourt MD.    Progress Note:  2:23 PM  Labs indicate leukocytosis which is relatively chronic for patient. She is afebrile with normal CXR. Will continue management for COPD. Pt ambulated and maintained her oxygen saturation. Advised her to take prednisone and azithromycin as previously prescribed in ED. Pt has breathing treatments at home and discussed proper use of breathing treatment. Provided customary ED instructions.   Written by Cari Gregory ED Scribe, as dictated by Demetrius Betancourt MD.     LABORATORY TESTS:  Recent Results (from the past 12 hour(s))   METABOLIC PANEL, COMPREHENSIVE    Collection Time: 06/16/17 11:59 AM   Result Value Ref Range    Sodium 139 136 - 145 mmol/L    Potassium 4.2 3.5 - 5.1 mmol/L    Chloride 104 97 - 108 mmol/L    CO2 32 21 - 32 mmol/L    Anion gap 3 (L) 5 - 15 mmol/L    Glucose 93 65 - 100 mg/dL    BUN 11 6 - 20 MG/DL    Creatinine 0.79 0.55 - 1.02 MG/DL    BUN/Creatinine ratio 14 12 - 20      GFR est AA >60 >60 ml/min/1.73m2    GFR est non-AA >60 >60 ml/min/1.73m2    Calcium 10.3 (H) 8.5 - 10.1 MG/DL    Bilirubin, total 0.4 0.2 - 1.0 MG/DL    ALT (SGPT) 17 12 - 78 U/L    AST (SGOT) 10 (L) 15 - 37 U/L    Alk. phosphatase 100 45 - 117 U/L    Protein, total 7.7 6.4 - 8.2 g/dL    Albumin 3.0 (L) 3.5 - 5.0 g/dL    Globulin 4.7 (H) 2.0 - 4.0 g/dL    A-G Ratio 0.6 (L) 1.1 - 2.2     CBC WITH AUTOMATED DIFF    Collection Time: 06/16/17 11:59 AM   Result Value Ref Range    WBC 14.3 (H) 3.6 - 11.0 K/uL    RBC 4.28 3.80 - 5.20 M/uL    HGB 12.7 11.5 - 16.0 g/dL    HCT 40.9 35.0 - 47.0 %    MCV 95.6 80.0 - 99.0 FL    MCH 29.7 26.0 - 34.0 PG    MCHC 31.1 30.0 - 36.5 g/dL    RDW 14.0 11.5 - 14.5 %    PLATELET 973 949 - 248 K/uL    NEUTROPHILS 84 (H) 32 - 75 %    LYMPHOCYTES 9 (L) 12 - 49 %    MONOCYTES 5 5 - 13 %    EOSINOPHILS 2 0 - 7 %    BASOPHILS 0 0 - 1 %    ABS. NEUTROPHILS 12.0 (H) 1.8 - 8.0 K/UL    ABS. LYMPHOCYTES 1.3 0.8 - 3.5 K/UL    ABS. MONOCYTES 0.7 0.0 - 1.0 K/UL    ABS. EOSINOPHILS 0.2 0.0 - 0.4 K/UL    ABS. BASOPHILS 0.0 0.0 - 0.1 K/UL   PRO-BNP    Collection Time: 06/16/17 11:59 AM   Result Value Ref Range    NT pro- (H) 0 - 125 PG/ML   TROPONIN I    Collection Time: 06/16/17 11:59 AM   Result Value Ref Range    Troponin-I, Qt. <0.04 <0.05 ng/mL   CK W/ CKMB & INDEX    Collection Time: 06/16/17 11:59 AM   Result Value Ref Range    CK 25 (L) 26 - 192 U/L    CK - MB <1.0 <3.6 NG/ML    CK-MB Index Cannot be calulated 0 - 2.5         IMAGING RESULTS:  Exam: 2 view chest     Indication: Worsening chest pain since last night     Comparison to 6/11/2017.     PA and lateral views demonstrate normal heart size. There is no acute process in  the lung fields.  The osseous structures are unremarkable.     IMPRESSION  Impression: No acute process or change compared to the prior exam.            MEDICATIONS GIVEN:  Medications   sodium chloride (NS) flush 5-10 mL (not administered) predniSONE (DELTASONE) tablet 60 mg (not administered)   albuterol-ipratropium (DUO-NEB) 2.5 MG-0.5 MG/3 ML (3 mL Nebulization Given 6/16/17 1202)   albuterol-ipratropium (DUO-NEB) 2.5 MG-0.5 MG/3 ML (3 mL Nebulization Given 6/16/17 1120)   acetaminophen (TYLENOL) tablet 650 mg (650 mg Oral Given 6/16/17 1201)       IMPRESSION:  1. Acute exacerbation of chronic obstructive pulmonary disease (COPD) ([facility] [address]        PLAN:  1. Current Discharge Medication List        2. Follow-up Information     Follow up With Details Comments Contact Info    Landon Raza MD In 2 days Please follow up with primary care provider Patient can only remember the practice name and not the physician      [facility]  As needed, If symptoms worsen [address]  [address]   [address] [address] [place]        Return to ED if worse     DISCHARGE NOTE  2:23 PM  The patient has been re-evaluated and is ready for discharge. Reviewed available results with patient. Counseled pt on diagnosis and care plan. Pt has expressed understanding, and all questions have been answered. Pt agrees with plan and agrees to F/U as recommended, or return to the ED if their sxs worsen. Discharge instructions have been provided and explained to the pt, along with reasons to return to the ED. Written by Mattie Cordon, ED Scribe, as dictated by Rikki Lazo MD.    This note is prepared by Mattie Cordon, acting as Scribe for Rikki Lazo MD.    Rikki Lazo MD: The scribe's documentation has been prepared under my direction and personally reviewed by me in its entirety. I confirm that the note above accurately reflects all work, treatment, procedures, and medical decision making performed by me.

## 2017-06-17 LAB
ATRIAL RATE: 93 BPM
CALCULATED P AXIS, ECG09: 64 DEGREES
CALCULATED R AXIS, ECG10: 61 DEGREES
CALCULATED T AXIS, ECG11: 50 DEGREES
DIAGNOSIS, 93000: NORMAL
P-R INTERVAL, ECG05: 164 MS
Q-T INTERVAL, ECG07: 344 MS
QRS DURATION, ECG06: 86 MS
QTC CALCULATION (BEZET), ECG08: 427 MS
VENTRICULAR RATE, ECG03: 93 BPM

## 2017-06-25 ENCOUNTER — APPOINTMENT (OUTPATIENT)
Dept: GENERAL RADIOLOGY | Age: 47
End: 2017-06-25
Attending: EMERGENCY MEDICINE
Payer: MEDICARE

## 2017-06-25 ENCOUNTER — HOSPITAL ENCOUNTER (EMERGENCY)
Age: 47
Discharge: HOME | End: 2017-06-25
Attending: EMERGENCY MEDICINE | Admitting: EMERGENCY MEDICINE
Payer: MEDICARE

## 2017-06-25 VITALS
RESPIRATION RATE: 20 BRPM | SYSTOLIC BLOOD PRESSURE: 121 MMHG | DIASTOLIC BLOOD PRESSURE: 71 MMHG | BODY MASS INDEX: 71.82 KG/M2 | HEART RATE: 88 BPM | OXYGEN SATURATION: 95 % | WEIGHT: 293 LBS | TEMPERATURE: 98.6 F

## 2017-06-25 DIAGNOSIS — J44.9 CHRONIC OBSTRUCTIVE PULMONARY DISEASE, UNSPECIFIED COPD TYPE (HCC): ICD-10-CM

## 2017-06-25 DIAGNOSIS — L97.929 LEG ULCER, LEFT, WITH UNSPECIFIED SEVERITY (HCC): ICD-10-CM

## 2017-06-25 DIAGNOSIS — R07.9 ACUTE CHEST PAIN: Primary | ICD-10-CM

## 2017-06-25 LAB
ALBUMIN SERPL BCP-MCNC: 3.1 G/DL (ref 3.5–5)
ALBUMIN/GLOB SERPL: 0.7 {RATIO} (ref 1.1–2.2)
ALP SERPL-CCNC: 100 U/L (ref 45–117)
ALT SERPL-CCNC: 18 U/L (ref 12–78)
ANION GAP BLD CALC-SCNC: 4 MMOL/L (ref 5–15)
AST SERPL W P-5'-P-CCNC: 15 U/L (ref 15–37)
BASOPHILS # BLD AUTO: 0 K/UL (ref 0–0.1)
BASOPHILS # BLD: 0 % (ref 0–1)
BILIRUB SERPL-MCNC: 0.4 MG/DL (ref 0.2–1)
BNP SERPL-MCNC: 179 PG/ML (ref 0–125)
BUN SERPL-MCNC: 13 MG/DL (ref 6–20)
BUN/CREAT SERPL: 16 (ref 12–20)
CALCIUM SERPL-MCNC: 10.6 MG/DL (ref 8.5–10.1)
CHLORIDE SERPL-SCNC: 103 MMOL/L (ref 97–108)
CK SERPL-CCNC: 22 U/L (ref 26–192)
CO2 SERPL-SCNC: 33 MMOL/L (ref 21–32)
CREAT SERPL-MCNC: 0.81 MG/DL (ref 0.55–1.02)
EOSINOPHIL # BLD: 0.3 K/UL (ref 0–0.4)
EOSINOPHIL NFR BLD: 2 % (ref 0–7)
ERYTHROCYTE [DISTWIDTH] IN BLOOD BY AUTOMATED COUNT: 14.1 % (ref 11.5–14.5)
GLOBULIN SER CALC-MCNC: 4.6 G/DL (ref 2–4)
GLUCOSE SERPL-MCNC: 68 MG/DL (ref 65–100)
HCT VFR BLD AUTO: 41.2 % (ref 35–47)
HGB BLD-MCNC: 12.9 G/DL (ref 11.5–16)
LYMPHOCYTES # BLD AUTO: 11 % (ref 12–49)
LYMPHOCYTES # BLD: 1.7 K/UL (ref 0.8–3.5)
MCH RBC QN AUTO: 30.4 PG (ref 26–34)
MCHC RBC AUTO-ENTMCNC: 31.3 G/DL (ref 30–36.5)
MCV RBC AUTO: 96.9 FL (ref 80–99)
MONOCYTES # BLD: 1.1 K/UL (ref 0–1)
MONOCYTES NFR BLD AUTO: 7 % (ref 5–13)
NEUTS SEG # BLD: 11.7 K/UL (ref 1.8–8)
NEUTS SEG NFR BLD AUTO: 80 % (ref 32–75)
PLATELET # BLD AUTO: 230 K/UL (ref 150–400)
POTASSIUM SERPL-SCNC: 3.9 MMOL/L (ref 3.5–5.1)
PROT SERPL-MCNC: 7.7 G/DL (ref 6.4–8.2)
RBC # BLD AUTO: 4.25 M/UL (ref 3.8–5.2)
SODIUM SERPL-SCNC: 140 MMOL/L (ref 136–145)
TROPONIN I SERPL-MCNC: <0.04 NG/ML
WBC # BLD AUTO: 14.8 K/UL (ref 3.6–11)

## 2017-06-25 PROCEDURE — 85025 COMPLETE CBC W/AUTO DIFF WBC: CPT | Performed by: EMERGENCY MEDICINE

## 2017-06-25 PROCEDURE — 84484 ASSAY OF TROPONIN QUANT: CPT | Performed by: EMERGENCY MEDICINE

## 2017-06-25 PROCEDURE — 83880 ASSAY OF NATRIURETIC PEPTIDE: CPT | Performed by: EMERGENCY MEDICINE

## 2017-06-25 PROCEDURE — 99284 EMERGENCY DEPT VISIT MOD MDM: CPT

## 2017-06-25 PROCEDURE — 74011250637 HC RX REV CODE- 250/637: Performed by: EMERGENCY MEDICINE

## 2017-06-25 PROCEDURE — 36415 COLL VENOUS BLD VENIPUNCTURE: CPT | Performed by: EMERGENCY MEDICINE

## 2017-06-25 PROCEDURE — 82550 ASSAY OF CK (CPK): CPT | Performed by: EMERGENCY MEDICINE

## 2017-06-25 PROCEDURE — 80053 COMPREHEN METABOLIC PANEL: CPT | Performed by: EMERGENCY MEDICINE

## 2017-06-25 PROCEDURE — 71020 XR CHEST PA LAT: CPT

## 2017-06-25 PROCEDURE — 93005 ELECTROCARDIOGRAM TRACING: CPT

## 2017-06-25 RX ORDER — HYDROCODONE BITARTRATE AND ACETAMINOPHEN 5; 325 MG/1; MG/1
1 TABLET ORAL
Qty: 20 TAB | Refills: 0 | Status: ON HOLD | OUTPATIENT
Start: 2017-06-25 | End: 2017-07-27

## 2017-06-25 RX ORDER — HYDROCODONE BITARTRATE AND ACETAMINOPHEN 5; 325 MG/1; MG/1
1 TABLET ORAL
Status: COMPLETED | OUTPATIENT
Start: 2017-06-25 | End: 2017-06-25

## 2017-06-25 RX ADMIN — HYDROCODONE BITARTRATE AND ACETAMINOPHEN 1 TABLET: 5; 325 TABLET ORAL at 16:11

## 2017-06-25 NOTE — ED NOTES
Patient cont to wait for transport. She is seated in a chair and has received and eaten her food tray.   Will cont to [facility] until [facility] arrives

## 2017-06-25 NOTE — ED NOTES
Patient reports sob and pain to left chest after receiving an ECHO at home and the tech pushed her chest with the probe. Pt states she has been here several times for the same complaint and today she is having a harder time catching her breath.   Pt is currently on 5L O2 NC at home

## 2017-06-25 NOTE — DISCHARGE INSTRUCTIONS
Venous Skin Ulcer: Care Instructions  Your Care Instructions  A venous skin ulcer is a shallow wound that develops when the leg veins do not move blood back to the heart normally. Your veins have one-way valves that keep blood flowing toward the heart. When the valves are damaged, the blood can back up and pool in the vein. The blood may leak out of the vein into tissue around the vein. The tissue can break down and form an ulcer. The first sign of a venous skin ulcer is skin that turns dark red or purple over the area where the blood is leaking out of the vein. The skin also may become thick, dry, and itchy. Without treatment, an ulcer may form. The ulcer may be painful. Your leg also may swell and ache. If the ulcer becomes infected, the infection may cause an odor, and pus may drain from the ulcer. The area around the ulcer also may be more tender and red. Follow-up care is a key part of your treatment and safety. Be sure to make and go to all appointments, and call your doctor if you are having problems. It's also a good idea to know your test results and keep a list of the medicines you take. How can you care for yourself at home? · Follow your doctor's instructions on how to clean the ulcer and change the bandage. · If your doctor prescribed antibiotics, take them as directed. Do not stop taking them just because you feel better. You need to take the full course of antibiotics. · Lift your legs above the level of your heart as often as possible. For example, lie down and then prop up your legs with pillows. · Wear compression stockings or bandages. They help the blood circulate in your legs. And they help prevent blood from pooling in your legs. But there are different types of stockings, and they need to fit right. So your doctor will recommend what you need. · After your ulcer has healed, continue to wear compression stockings. Take them off only when you bathe and sleep.  Compression helps your blood circulate and helps prevent other ulcers from forming. · Walk daily. Walking helps your blood circulation. When should you call for help? Call your doctor now or seek immediate medical care if:  · You have symptoms of infection, such as:  ¨ Increased pain, swelling, warmth, or redness. ¨ Red streaks leading from the ulcer. ¨ Pus draining from the ulcer. ¨ A fever. Watch closely for changes in your health, and be sure to contact your doctor if:  · Your ulcer is not healing. · You have new ulcers. · The ulcer starts to bleed, and blood soaks through the bandage. Oozing small amounts of a mix of blood and fluid is normal.  · You have new bleeding. · You do not get better as expected. Where can you learn more? Go to http://cassia-donte.info/. Enter X092 in the search box to learn more about \"Venous Skin Ulcer: Care Instructions. \"  Current as of: March 20, 2017  Content Version: 11.3  © 4777-3179 [employer]. Care instructions adapted under license by [employer] (which disclaims liability or warranty for this information). If you have questions about a medical condition or this instruction, always ask your healthcare professional. [address] any warranty or liability for your use of this information. Chest Pain: Care Instructions  Your Care Instructions  There are many things that can cause chest pain. Some are not serious and will get better on their own in a few days. But some kinds of chest pain need more testing and treatment. Your doctor may have recommended a follow-up visit in the next 8 to 12 hours. If you are not getting better, you may need more tests or treatment. Even though your doctor has released you, you still need to watch for any problems. The doctor carefully checked you, but sometimes problems can develop later. If you have new symptoms or if your symptoms do not get better, get medical care right away.   If you have worse or different chest pain or pressure that lasts more than 5 minutes or you passed out (lost consciousness), call 911 or seek other emergency help right away. A medical visit is only one step in your treatment. Even if you feel better, you still need to do what your doctor recommends, such as going to all suggested follow-up appointments and taking medicines exactly as directed. This will help you recover and help prevent future problems. How can you care for yourself at home? · Rest until you feel better. · Take your medicine exactly as prescribed. Call your doctor if you think you are having a problem with your medicine. · Do not drive after taking a prescription pain medicine. When should you call for help? Call 911 if:  · You passed out (lost consciousness). · You have severe difficulty breathing. · You have symptoms of a heart attack. These may include:  ¨ Chest pain or pressure, or a strange feeling in your chest.  ¨ Sweating. ¨ Shortness of breath. ¨ Nausea or vomiting. ¨ Pain, pressure, or a strange feeling in your back, neck, jaw, or upper belly or in one or both shoulders or arms. ¨ Lightheadedness or sudden weakness. ¨ A fast or irregular heartbeat. After you call 911, the  may tell you to chew 1 adult-strength or 2 to 4 low-dose aspirin. Wait for an ambulance. Do not try to drive yourself. Call your doctor today if:  · You have any trouble breathing. · Your chest pain gets worse. · You are dizzy or lightheaded, or you feel like you may faint. · You are not getting better as expected. · You are having new or different chest pain. Where can you learn more? Go to http://cassia-donte.info/. Enter A120 in the search box to learn more about \"Chest Pain: Care Instructions. \"  Current as of: March 20, 2017  Content Version: 11.3  © 3947-4455 [employer].  Care instructions adapted under license by [employer] (which disclaims liability or warranty for this information). If you have questions about a medical condition or this instruction, always ask your healthcare professional. [address] any warranty or liability for your use of this information. Chronic Obstructive Pulmonary Disease (COPD): Care Instructions  Your Care Instructions    Chronic obstructive pulmonary disease (COPD) is a general term for a group of lung diseases, including emphysema and chronic bronchitis. People with COPD have decreased airflow in and out of the lungs, which makes it hard to breathe. The airways also can get clogged with thick mucus. Cigarette smoking is a major cause of COPD. Although there is no cure for COPD, you can slow its progress. Following your treatment plan and taking care of yourself can help you feel better and live longer. Follow-up care is a key part of your treatment and safety. Be sure to make and go to all appointments, and call your doctor if you are having problems. It's also a good idea to know your test results and keep a list of the medicines you take. How can you care for yourself at home? Staying healthy  · Do not smoke. This is the most important step you can take to prevent more damage to your lungs. If you need help quitting, talk to your doctor about stop-smoking programs and medicines. These can increase your chances of quitting for good. · Avoid colds and flu. Get a pneumococcal vaccine shot. If you have had one before, ask your doctor whether you need a second dose. Get the flu vaccine every fall. If you must be around people with colds or the flu, wash your hands often. · Avoid secondhand smoke, air pollution, and high altitudes. Also avoid cold, dry air and hot, humid air. Stay at home with your windows closed when air pollution is bad. Medicines and oxygen therapy  · Take your medicines exactly as prescribed.  Call your doctor if you think you are having a problem with your medicine. · You may be taking medicines such as:  ¨ Bronchodilators. These help open your airways and make breathing easier. Bronchodilators are either short-acting (work for 6 to 9 hours) or long-acting (work for 24 hours). You inhale most bronchodilators, so they start to act quickly. Always carry your quick-relief inhaler with you in case you need it while you are away from home. ¨ Corticosteroids (prednisone, budesonide). These reduce airway inflammation. They come in pill or inhaled form. You must take these medicines every day for them to work well. · A spacer may help you get more inhaled medicine to your lungs. Ask your doctor or pharmacist if a spacer is right for you. If it is, ask how to use it properly. · Do not take any vitamins, over-the-counter medicine, or herbal products without talking to your doctor first.  · If your doctor prescribed antibiotics, take them as directed. Do not stop taking them just because you feel better. You need to take the full course of antibiotics. · Oxygen therapy boosts the amount of oxygen in your blood and helps you breathe easier. Use the flow rate your doctor has recommended, and do not change it without talking to your doctor first.  Activity  · Get regular exercise. Walking is an easy way to get exercise. Start out slowly, and walk a little more each day. · Pay attention to your breathing. You are exercising too hard if you cannot talk while you are exercising. · Take short rest breaks when doing household chores and other activities. · Learn breathing methods--such as breathing through pursed lips--to help you become less short of breath. · If your doctor has not set you up with a pulmonary rehabilitation program, talk to him or her about whether rehab is right for you. Rehab includes exercise programs, education about your disease and how to manage it, help with diet and other changes, and emotional support. Diet  · Eat regular, healthy meals.  Use bronchodilators about 1 hour before you eat to make it easier to eat. Eat several small meals instead of three large ones. Drink beverages at the end of the meal. Avoid foods that are hard to chew. · Eat foods that contain protein so that you do not lose muscle mass. · Talk with your doctor if you gain too much weight or if you lose weight without trying. Mental health  · Talk to your family, friends, or a therapist about your feelings. It is normal to feel frightened, angry, hopeless, helpless, and even guilty. Talking openly about bad feelings can help you cope. If these feelings last, talk to your doctor. When should you call for help? Call 911 anytime you think you may need emergency care. For example, call if:  · You have severe trouble breathing. Call your doctor now or seek immediate medical care if:  · You have new or worse trouble breathing. · You cough up blood. · You have a fever. Watch closely for changes in your health, and be sure to contact your doctor if:  · You cough more deeply or more often, especially if you notice more mucus or a change in the color of your mucus. · You have new or worse swelling in your legs or belly. · You are not getting better as expected. Where can you learn more? Go to http://cassia-donte.info/. Nik Susan in the search box to learn more about \"Chronic Obstructive Pulmonary Disease (COPD): Care Instructions. \"  Current as of: March 25, 2017  Content Version: 11.3  © 3005-9717 [employer]. Care instructions adapted under license by [employer] (which disclaims liability or warranty for this information). If you have questions about a medical condition or this instruction, always ask your healthcare professional. [place] [address] any warranty or liability for your use of this information.

## 2017-06-25 NOTE — ED PROVIDER NOTES
HPI Comments: Lidia Robins is a 41 y.o. female with significant PMHx of asthma, CHF, COPD, diabetes, and CAD, presents via EMS to the ED with c/o 5/10 left sided \"burning\" chest pain x 3 weeks. Pt notes associated symptoms of SOB and productive (\"biege and white\") cough. Pt states she has come to [id number] [facility] ER 2 times this month for similar symptoms. She reports the chest pain started after she had an ECHO done. Pt states last night the pain radiated into her jaw and she felt tingling in her left arm. She notes it feels as if her \"lung is not opening like it suppose to\". She reports no relief after breathing treatment. Pt states she is on 6L oxygen all the time. Pt denies fever, chills, diaphoresis, abdominal pain, or nausea. Lidia Robins is a 41 y.o. female with significant PMHx of asthma, CHF, COPD, diabetes, and CAD, presents via EMS to the ED with c/o intermittent wound to posterior lower left leg x \"more than 9 months\". Pt reports her leg \"keeps busting open\". Pt states she noticed maggots in her wound 2 weeks ago and informed her home health nurse. She reports the nurse removed the maggots and her doctor referred her to an ER 1 week ago. Pt states she did not want to go to an ER then because there was a recent death in her family. She notes she had maggots in her wound before. PCP: Landon Raza MD  Social Hx: +tobacco (0.25 packs a day), -EtOH    There are no other complaints, changes or physical findings at this time. Written by SHRUTHI Boss, as dictated by Sukhwinder Sosa MD      The history is provided by the patient. No  was used.         Past Medical History:   Diagnosis Date    Arthritis     Asthma     CAD (coronary artery disease)     Congestive heart failure (HCC)     [facility]     Diabetes [facility]     Hypertension     Morbid obesity with BMI of 70 and over, adult ([facility]     NSTEMI (non-ST elevated myocardial infarction) ([facility]     SVT (supraventricular tachycardia) ([facility] [address]        Past Surgical History:   Procedure Laterality Date    CARDIAC SURG PROCEDURE UNLIST      Stents    HX  SECTION      HX ORTHOPAEDIC      HX OTHER SURGICAL      cyst removed from back         Family History:   Problem Relation Age of Onset    Heart Disease Mother     Heart Disease Father     Heart Disease Sister        Social History     Social History    Marital status: SINGLE     Spouse name: N/A    Number of children: N/A    Years of education: N/A     Occupational History    Not on file. Social History Main Topics    Smoking status: Current Every Day Smoker     Packs/day: 0.25     Types: Cigarettes    Smokeless tobacco: Never Used    Alcohol use No    Drug use: No    Sexual activity: Not on file     Other Topics Concern    Not on file     Social History Narrative    ** Merged History Encounter **              ALLERGIES: Review of patient's allergies indicates no known allergies. Review of Systems   Constitutional: Negative for chills, diaphoresis and fever. HENT: Negative for congestion. Eyes: Negative. Respiratory: Positive for cough (productive \"biege and white\" ) and shortness of breath. Cardiovascular: Positive for chest pain (left sided \"burning\"). Gastrointestinal: Negative for abdominal pain and nausea. Endocrine: Negative for heat intolerance. Genitourinary: Negative for dysuria. Musculoskeletal: Negative for back pain. Skin: Positive for wound (ulcer to posterior LLE). Allergic/Immunologic: Negative for immunocompromised state. Neurological: Negative for dizziness. Hematological: Does not bruise/bleed easily. Psychiatric/Behavioral: Negative. All other systems reviewed and are negative.       Vitals:    17 1446 17 1656   BP: 116/64    Pulse: 94    Resp: 22    Temp: 98.6 °F (37 °C)    SpO2: 96%    Weight:  (!) 201.9 kg (445 lb)            Physical Exam Constitutional: She is oriented to person, place, and time. She appears well-developed and well-nourished. She appears distressed (mild). Severely elevated BMI   HENT:   Head: Normocephalic and atraumatic. Eyes: EOM are normal.   Neck: Normal range of motion. Neck supple. Cardiovascular: Normal rate, regular rhythm and normal heart sounds. Pulmonary/Chest: Effort normal and breath sounds normal. No respiratory distress. She has no wheezes. She has no rhonchi. She has no rales. She exhibits tenderness (Reproducible). Abdominal: Soft. Bowel sounds are normal. She exhibits no mass. There is no tenderness. Musculoskeletal: Normal range of motion. She exhibits edema. Chronic lymphoedema. Neurological: She is alert and oriented to person, place, and time. Coordination normal.   Skin: Skin is warm and dry. Posterior calf with slight yellowish and brownish drainage    Psychiatric: She has a normal mood and affect. Nursing note and vitals reviewed. MDM  Number of Diagnoses or Management Options  Acute chest pain:   Chronic obstructive pulmonary disease, unspecified COPD type [facility]):   Leg ulcer, left, with unspecified severity [facility]):   Diagnosis management comments:     DDx: COPD exacerbation, CAD, CHF, costochondritis, wound infection, lymphoedema, leg ulcer        Amount and/or Complexity of Data Reviewed  Clinical lab tests: ordered and reviewed  Tests in the radiology section of CPT®: ordered and reviewed  Tests in the medicine section of CPT®: ordered and reviewed  Independent visualization of images, tracings, or specimens: yes    Patient Progress  Patient progress: stable    ED Course       Procedures    EKG interpretation: (Preliminary) 15:12  Rhythm: sinus rhythm with occasional PVC's; and regular . Rate (approx.): 89; Axis: normal; ME interval: normal; QRS interval: low voltage; ST/T wave: normal; Other findings: no significant change from previous.   Written by Art Anne ED Vannesa, as dictated by Isha Loredo MD.       LABORATORY TESTS:  Recent Results (from the past 12 hour(s))   EKG, 12 LEAD, INITIAL    Collection Time: 06/25/17  3:12 PM   Result Value Ref Range    Ventricular Rate 89 BPM    Atrial Rate 89 BPM    P-R Interval 178 ms    QRS Duration 84 ms    Q-T Interval 354 ms    QTC Calculation (Bezet) 430 ms    Calculated P Axis 40 degrees    Calculated R Axis 76 degrees    Calculated T Axis 43 degrees    Diagnosis       Sinus rhythm with occasional premature ventricular complexes  Low voltage QRS  When compared with ECG of 16-JUN-2017 10:21,  No significant change was found     CBC WITH AUTOMATED DIFF    Collection Time: 06/25/17  3:25 PM   Result Value Ref Range    WBC 14.8 (H) 3.6 - 11.0 K/uL    RBC 4.25 3.80 - 5.20 M/uL    HGB 12.9 11.5 - 16.0 g/dL    HCT 41.2 35.0 - 47.0 %    MCV 96.9 80.0 - 99.0 FL    MCH 30.4 26.0 - 34.0 PG    MCHC 31.3 30.0 - 36.5 g/dL    RDW 14.1 11.5 - 14.5 %    PLATELET 185 583 - 683 K/uL    NEUTROPHILS 80 (H) 32 - 75 %    LYMPHOCYTES 11 (L) 12 - 49 %    MONOCYTES 7 5 - 13 %    EOSINOPHILS 2 0 - 7 %    BASOPHILS 0 0 - 1 %    ABS. NEUTROPHILS 11.7 (H) 1.8 - 8.0 K/UL    ABS. LYMPHOCYTES 1.7 0.8 - 3.5 K/UL    ABS. MONOCYTES 1.1 (H) 0.0 - 1.0 K/UL    ABS. EOSINOPHILS 0.3 0.0 - 0.4 K/UL    ABS. BASOPHILS 0.0 0.0 - 0.1 K/UL   METABOLIC PANEL, COMPREHENSIVE    Collection Time: 06/25/17  3:25 PM   Result Value Ref Range    Sodium 140 136 - 145 mmol/L    Potassium 3.9 3.5 - 5.1 mmol/L    Chloride 103 97 - 108 mmol/L    CO2 33 (H) 21 - 32 mmol/L    Anion gap 4 (L) 5 - 15 mmol/L    Glucose 68 65 - 100 mg/dL    BUN 13 6 - 20 MG/DL    Creatinine 0.81 0.55 - 1.02 MG/DL    BUN/Creatinine ratio 16 12 - 20      GFR est AA >60 >60 ml/min/1.73m2    GFR est non-AA >60 >60 ml/min/1.73m2    Calcium 10.6 (H) 8.5 - 10.1 MG/DL    Bilirubin, total 0.4 0.2 - 1.0 MG/DL    ALT (SGPT) 18 12 - 78 U/L    AST (SGOT) 15 15 - 37 U/L    Alk.  phosphatase 100 45 - 117 U/L    Protein, total 7.7 6.4 - 8.2 g/dL    Albumin 3.1 (L) 3.5 - 5.0 g/dL    Globulin 4.6 (H) 2.0 - 4.0 g/dL    A-G Ratio 0.7 (L) 1.1 - 2.2     CK    Collection Time: 06/25/17  3:25 PM   Result Value Ref Range    CK 22 (L) 26 - 192 U/L   TROPONIN I    Collection Time: 06/25/17  3:25 PM   Result Value Ref Range    Troponin-I, Qt. <0.04 <0.05 ng/mL   PRO-BNP    Collection Time: 06/25/17  3:25 PM   Result Value Ref Range    NT pro- (H) 0 - 125 PG/ML       IMAGING RESULTS:  Exam:  2 view chest     Indication: Shortness of breath, left chest pain     Comparison to 6/16/2017.     PA and lateral views demonstrate normal heart size. There is no acute process in  the lung fields. Mild degenerative changes are seen in the thoracic spine.     IMPRESSION  Impression: No acute process or change compared to the prior exam.       MEDICATIONS GIVEN:  Medications   HYDROcodone-acetaminophen (NORCO) 5-325 mg per tablet 1 Tab (1 Tab Oral Given 6/25/17 1611)       IMPRESSION:  1. Acute chest pain    2. Chronic obstructive pulmonary disease, unspecified COPD type ([address]    3. Leg ulcer, left, with unspecified severity ([address]        PLAN:  1. Current Discharge Medication List      START taking these medications    Details   HYDROcodone-acetaminophen (NORCO) 5-325 mg per tablet Take 1 Tab by mouth every four (4) hours as needed for Pain. Max Daily Amount: 6 Tabs. Qty: 20 Tab, Refills: 0           2. Follow-up Information     Follow up With Details Comments Contact Info    Az Jolly MD Call in 2 days  [address] [facility]  [facility] [place] [place] [facility] [phone]      \[facility] [place] [facility]\"" EMERGENCY DEPT  If symptoms worsen [address]  [address] [facility]  [phone]        Return to ED if worse     DISCHARGE NOTE  4:38 PM   The patient has been re-evaluated and is ready for discharge. Reviewed available results with patient. Counseled pt on diagnosis and care plan. Pt has expressed understanding, and all questions have been answered. Pt agrees with plan and agrees to F/U as recommended, or return to the ED if their sxs worsen. Discharge instructions have been provided and explained to the pt, along with reasons to return to the ED. Written by Pavan Pruitt, ED Scribe, as dictated by Wil Caldwell MD.      This note is prepared by Pavan Pruitt, acting as Scribe for Wil Caldwell MD.    Wil Caldwell MD : The scribe's documentation has been prepared under my direction and personally reviewed by me in its entirety. I confirm that the note above accurately reflects all work, treatment, procedures, and medical decision making performed by me.

## 2017-06-27 LAB
ATRIAL RATE: 89 BPM
CALCULATED P AXIS, ECG09: 40 DEGREES
CALCULATED R AXIS, ECG10: 76 DEGREES
CALCULATED T AXIS, ECG11: 43 DEGREES
DIAGNOSIS, 93000: NORMAL
P-R INTERVAL, ECG05: 178 MS
Q-T INTERVAL, ECG07: 354 MS
QRS DURATION, ECG06: 84 MS
QTC CALCULATION (BEZET), ECG08: 430 MS
VENTRICULAR RATE, ECG03: 89 BPM

## 2017-07-24 ENCOUNTER — HOSPITAL ENCOUNTER (INPATIENT)
Age: 47
LOS: 3 days | Discharge: HOME HEALTH | DRG: 191 | End: 2017-07-27
Attending: EMERGENCY MEDICINE | Admitting: INTERNAL MEDICINE
Payer: MEDICARE

## 2017-07-24 ENCOUNTER — APPOINTMENT (OUTPATIENT)
Dept: GENERAL RADIOLOGY | Age: 47
DRG: 191 | End: 2017-07-24
Attending: EMERGENCY MEDICINE
Payer: MEDICARE

## 2017-07-24 DIAGNOSIS — L03.116 CELLULITIS OF LEFT LOWER LEG: Primary | ICD-10-CM

## 2017-07-24 DIAGNOSIS — E66.2 MORBID OBESITY WITH ALVEOLAR HYPOVENTILATION (HCC): ICD-10-CM

## 2017-07-24 DIAGNOSIS — J44.1 ACUTE EXACERBATION OF CHRONIC OBSTRUCTIVE PULMONARY DISEASE (COPD) (HCC): ICD-10-CM

## 2017-07-24 DIAGNOSIS — B87.9 MAGGOT INFESTATION: ICD-10-CM

## 2017-07-24 LAB
ANION GAP BLD CALC-SCNC: 3 MMOL/L (ref 5–15)
BASOPHILS # BLD AUTO: 0 K/UL (ref 0–0.1)
BASOPHILS # BLD: 0 % (ref 0–1)
BUN SERPL-MCNC: 12 MG/DL (ref 6–20)
BUN/CREAT SERPL: 16 (ref 12–20)
CALCIUM SERPL-MCNC: 10.8 MG/DL (ref 8.5–10.1)
CHLORIDE SERPL-SCNC: 98 MMOL/L (ref 97–108)
CO2 SERPL-SCNC: 35 MMOL/L (ref 21–32)
CREAT SERPL-MCNC: 0.76 MG/DL (ref 0.55–1.02)
EOSINOPHIL # BLD: 0.2 K/UL (ref 0–0.4)
EOSINOPHIL NFR BLD: 2 % (ref 0–7)
ERYTHROCYTE [DISTWIDTH] IN BLOOD BY AUTOMATED COUNT: 14.1 % (ref 11.5–14.5)
GLUCOSE BLD STRIP.AUTO-MCNC: 99 MG/DL (ref 65–100)
GLUCOSE SERPL-MCNC: 78 MG/DL (ref 65–100)
HCT VFR BLD AUTO: 45.1 % (ref 35–47)
HGB BLD-MCNC: 13.5 G/DL (ref 11.5–16)
LYMPHOCYTES # BLD AUTO: 13 % (ref 12–49)
LYMPHOCYTES # BLD: 1.5 K/UL (ref 0.8–3.5)
MAGNESIUM SERPL-MCNC: 1.8 MG/DL (ref 1.6–2.4)
MCH RBC QN AUTO: 29 PG (ref 26–34)
MCHC RBC AUTO-ENTMCNC: 29.9 G/DL (ref 30–36.5)
MCV RBC AUTO: 97 FL (ref 80–99)
MONOCYTES # BLD: 0.5 K/UL (ref 0–1)
MONOCYTES NFR BLD AUTO: 4 % (ref 5–13)
NEUTS SEG # BLD: 9.5 K/UL (ref 1.8–8)
NEUTS SEG NFR BLD AUTO: 81 % (ref 32–75)
PLATELET # BLD AUTO: 299 K/UL (ref 150–400)
POTASSIUM SERPL-SCNC: 4.2 MMOL/L (ref 3.5–5.1)
RBC # BLD AUTO: 4.65 M/UL (ref 3.8–5.2)
SERVICE CMNT-IMP: NORMAL
SODIUM SERPL-SCNC: 136 MMOL/L (ref 136–145)
WBC # BLD AUTO: 11.7 K/UL (ref 3.6–11)

## 2017-07-24 PROCEDURE — 82962 GLUCOSE BLOOD TEST: CPT

## 2017-07-24 PROCEDURE — 74011000250 HC RX REV CODE- 250: Performed by: INTERNAL MEDICINE

## 2017-07-24 PROCEDURE — 74011250637 HC RX REV CODE- 250/637: Performed by: INTERNAL MEDICINE

## 2017-07-24 PROCEDURE — 93005 ELECTROCARDIOGRAM TRACING: CPT

## 2017-07-24 PROCEDURE — 99285 EMERGENCY DEPT VISIT HI MDM: CPT

## 2017-07-24 PROCEDURE — 80048 BASIC METABOLIC PNL TOTAL CA: CPT | Performed by: EMERGENCY MEDICINE

## 2017-07-24 PROCEDURE — 94640 AIRWAY INHALATION TREATMENT: CPT

## 2017-07-24 PROCEDURE — 71010 XR CHEST PORT: CPT

## 2017-07-24 PROCEDURE — 94762 N-INVAS EAR/PLS OXIMTRY CONT: CPT

## 2017-07-24 PROCEDURE — 85025 COMPLETE CBC W/AUTO DIFF WBC: CPT | Performed by: EMERGENCY MEDICINE

## 2017-07-24 PROCEDURE — 74011000258 HC RX REV CODE- 258: Performed by: EMERGENCY MEDICINE

## 2017-07-24 PROCEDURE — 74011000250 HC RX REV CODE- 250: Performed by: EMERGENCY MEDICINE

## 2017-07-24 PROCEDURE — 87040 BLOOD CULTURE FOR BACTERIA: CPT | Performed by: EMERGENCY MEDICINE

## 2017-07-24 PROCEDURE — 83735 ASSAY OF MAGNESIUM: CPT | Performed by: EMERGENCY MEDICINE

## 2017-07-24 PROCEDURE — 36415 COLL VENOUS BLD VENIPUNCTURE: CPT | Performed by: EMERGENCY MEDICINE

## 2017-07-24 PROCEDURE — 73590 X-RAY EXAM OF LOWER LEG: CPT

## 2017-07-24 PROCEDURE — 74011250636 HC RX REV CODE- 250/636: Performed by: INTERNAL MEDICINE

## 2017-07-24 PROCEDURE — 77030029684 HC NEB SM VOL KT MONA -A

## 2017-07-24 PROCEDURE — 65270000015 HC RM PRIVATE ONCOLOGY

## 2017-07-24 PROCEDURE — 74011250636 HC RX REV CODE- 250/636: Performed by: EMERGENCY MEDICINE

## 2017-07-24 RX ORDER — GUAIFENESIN 600 MG/1
600 TABLET, EXTENDED RELEASE ORAL 2 TIMES DAILY
Status: DISCONTINUED | OUTPATIENT
Start: 2017-07-24 | End: 2017-07-27 | Stop reason: HOSPADM

## 2017-07-24 RX ORDER — MAGNESIUM SULFATE 100 %
4 CRYSTALS MISCELLANEOUS AS NEEDED
Status: DISCONTINUED | OUTPATIENT
Start: 2017-07-24 | End: 2017-07-27 | Stop reason: HOSPADM

## 2017-07-24 RX ORDER — HEPARIN SODIUM 5000 [USP'U]/ML
5000 INJECTION, SOLUTION INTRAVENOUS; SUBCUTANEOUS EVERY 8 HOURS
Status: DISCONTINUED | OUTPATIENT
Start: 2017-07-24 | End: 2017-07-25

## 2017-07-24 RX ORDER — NICOTINE 7MG/24HR
1 PATCH, TRANSDERMAL 24 HOURS TRANSDERMAL EVERY 24 HOURS
Status: DISCONTINUED | OUTPATIENT
Start: 2017-07-24 | End: 2017-07-27 | Stop reason: HOSPADM

## 2017-07-24 RX ORDER — IPRATROPIUM BROMIDE AND ALBUTEROL SULFATE 2.5; .5 MG/3ML; MG/3ML
3 SOLUTION RESPIRATORY (INHALATION)
Status: COMPLETED | OUTPATIENT
Start: 2017-07-24 | End: 2017-07-24

## 2017-07-24 RX ORDER — METOPROLOL TARTRATE 25 MG/1
25 TABLET, FILM COATED ORAL 2 TIMES DAILY
Status: DISCONTINUED | OUTPATIENT
Start: 2017-07-24 | End: 2017-07-27 | Stop reason: HOSPADM

## 2017-07-24 RX ORDER — HYDROCODONE BITARTRATE AND ACETAMINOPHEN 5; 325 MG/1; MG/1
1 TABLET ORAL
Status: DISCONTINUED | OUTPATIENT
Start: 2017-07-24 | End: 2017-07-27 | Stop reason: HOSPADM

## 2017-07-24 RX ORDER — SODIUM CHLORIDE 0.9 % (FLUSH) 0.9 %
5-10 SYRINGE (ML) INJECTION AS NEEDED
Status: DISCONTINUED | OUTPATIENT
Start: 2017-07-24 | End: 2017-07-27 | Stop reason: HOSPADM

## 2017-07-24 RX ORDER — ACETAMINOPHEN 325 MG/1
650 TABLET ORAL
Status: DISCONTINUED | OUTPATIENT
Start: 2017-07-24 | End: 2017-07-27 | Stop reason: HOSPADM

## 2017-07-24 RX ORDER — ONDANSETRON 2 MG/ML
4 INJECTION INTRAMUSCULAR; INTRAVENOUS
Status: DISCONTINUED | OUTPATIENT
Start: 2017-07-24 | End: 2017-07-27 | Stop reason: HOSPADM

## 2017-07-24 RX ORDER — INSULIN LISPRO 100 [IU]/ML
INJECTION, SOLUTION INTRAVENOUS; SUBCUTANEOUS
Status: DISCONTINUED | OUTPATIENT
Start: 2017-07-24 | End: 2017-07-25

## 2017-07-24 RX ORDER — SODIUM CHLORIDE 0.9 % (FLUSH) 0.9 %
5-10 SYRINGE (ML) INJECTION EVERY 8 HOURS
Status: DISCONTINUED | OUTPATIENT
Start: 2017-07-24 | End: 2017-07-27 | Stop reason: HOSPADM

## 2017-07-24 RX ORDER — IPRATROPIUM BROMIDE AND ALBUTEROL SULFATE 2.5; .5 MG/3ML; MG/3ML
3 SOLUTION RESPIRATORY (INHALATION)
Status: DISCONTINUED | OUTPATIENT
Start: 2017-07-24 | End: 2017-07-26

## 2017-07-24 RX ORDER — ASPIRIN 81 MG/1
81 TABLET ORAL DAILY
Status: DISCONTINUED | OUTPATIENT
Start: 2017-07-25 | End: 2017-07-27 | Stop reason: HOSPADM

## 2017-07-24 RX ORDER — LISINOPRIL 20 MG/1
40 TABLET ORAL DAILY
Status: DISCONTINUED | OUTPATIENT
Start: 2017-07-25 | End: 2017-07-27 | Stop reason: HOSPADM

## 2017-07-24 RX ORDER — NALOXONE HYDROCHLORIDE 0.4 MG/ML
0.4 INJECTION, SOLUTION INTRAMUSCULAR; INTRAVENOUS; SUBCUTANEOUS AS NEEDED
Status: DISCONTINUED | OUTPATIENT
Start: 2017-07-24 | End: 2017-07-27 | Stop reason: HOSPADM

## 2017-07-24 RX ORDER — IPRATROPIUM BROMIDE AND ALBUTEROL SULFATE 2.5; .5 MG/3ML; MG/3ML
3 SOLUTION RESPIRATORY (INHALATION)
Status: DISCONTINUED | OUTPATIENT
Start: 2017-07-24 | End: 2017-07-27 | Stop reason: HOSPADM

## 2017-07-24 RX ORDER — FUROSEMIDE 40 MG/1
40 TABLET ORAL DAILY
Status: DISCONTINUED | OUTPATIENT
Start: 2017-07-25 | End: 2017-07-25

## 2017-07-24 RX ORDER — VANCOMYCIN 2 GRAM/500 ML IN 0.9 % SODIUM CHLORIDE INTRAVENOUS
2000 EVERY 12 HOURS
Status: DISCONTINUED | OUTPATIENT
Start: 2017-07-25 | End: 2017-07-26

## 2017-07-24 RX ORDER — GLYBURIDE 5 MG/1
5 TABLET ORAL
Status: DISCONTINUED | OUTPATIENT
Start: 2017-07-25 | End: 2017-07-27 | Stop reason: HOSPADM

## 2017-07-24 RX ORDER — DEXTROSE 50 % IN WATER (D50W) INTRAVENOUS SYRINGE
12.5-25 AS NEEDED
Status: DISCONTINUED | OUTPATIENT
Start: 2017-07-24 | End: 2017-07-24

## 2017-07-24 RX ORDER — DEXTROSE MONOHYDRATE 100 MG/ML
125-250 INJECTION, SOLUTION INTRAVENOUS AS NEEDED
Status: DISCONTINUED | OUTPATIENT
Start: 2017-07-24 | End: 2017-07-27 | Stop reason: HOSPADM

## 2017-07-24 RX ADMIN — IPRATROPIUM BROMIDE AND ALBUTEROL SULFATE 3 ML: .5; 3 SOLUTION RESPIRATORY (INHALATION) at 15:23

## 2017-07-24 RX ADMIN — IPRATROPIUM BROMIDE AND ALBUTEROL SULFATE 3 ML: .5; 3 SOLUTION RESPIRATORY (INHALATION) at 20:58

## 2017-07-24 RX ADMIN — VANCOMYCIN HYDROCHLORIDE 3000 MG: 10 INJECTION, POWDER, LYOPHILIZED, FOR SOLUTION INTRAVENOUS at 16:38

## 2017-07-24 RX ADMIN — HEPARIN SODIUM 5000 UNITS: 5000 INJECTION, SOLUTION INTRAVENOUS; SUBCUTANEOUS at 19:56

## 2017-07-24 RX ADMIN — GUAIFENESIN 600 MG: 600 TABLET, EXTENDED RELEASE ORAL at 19:57

## 2017-07-24 RX ADMIN — Medication 10 ML: at 22:22

## 2017-07-24 RX ADMIN — PIPERACILLIN SODIUM,TAZOBACTAM SODIUM 3.38 G: 3; .375 INJECTION, POWDER, FOR SOLUTION INTRAVENOUS at 15:23

## 2017-07-24 RX ADMIN — Medication 10 ML: at 19:57

## 2017-07-24 NOTE — ED NOTES
Patient c/o L leg pain and states \"I need a wound care nurse to come down here right now\". Notified MD and Dr Nereyda Armando in to see patient.

## 2017-07-24 NOTE — ED TRIAGE NOTES
Assumed care of pt from EMS. Pt ambulatory to stretcher and then to chair. Pt states she has been SOB since Thurs. Pt also has a wound to her left leg with maggots in it. Pt's doctor advised her to come to the ER. Per EMS, upon arrival her O2 sat was in the low 70's. Once they placed her on her \"normal 5L\" she was at 88%. Placed on monitor x3. Alert and oriented x3 VSS on 5L O2.

## 2017-07-24 NOTE — ED NOTES
Bedside and Verbal shift change report given to Nehemias Pavon RN by Rexie Riedel RN. Report included the following information SBAR and ED Summary. Pt. Sitting in chair with call bell.

## 2017-07-24 NOTE — H&P
Hospitalist Admission Note    NAME: Javier Riddle   :  1970   MRN:  [id number]     Date/Time:  2017 5:41 PM    Patient PCP: Andrzej Raza MD  ________________________________________________________________________    My assessment of this patient's clinical condition and my plan of care is as follows. Assessment / Plan:    COPD exacerbation  Chronic hypoxic and hypercapneic respiratory failure  OHS / Morbid obesity  -her CPAP or BIPAP is broken  -CXR negative  -start mucinex and scheduled duonebs.    -the antibiotics for her leg should cover possible bronchitis  -continue oxygen    Chronic LE lymphedema / venous stasis  Chronic left leg wound with cellulits and maggot infestation  -continue zosyn and vanco started in ER  -wound care consult    CAD, s/p stents  HTN  Chronic diastolic HF  -continue cardiac meds. CXR negative. Clinically compensated    DM  -continue glyburide with SSI prn    Tobacco abuse  -down to  ppd. Nicoderm patch      Code Status: full but doesn't want to be on prolong life support  Surrogate Decision Maker: Son Mario Geronimo       DVT Prophylaxis: Heparin  GI Prophylaxis: NI     Taken         Subjective:   CHIEF COMPLAINT:   SOB    HISTORY OF PRESENT ILLNESS:     Carolina Alas is a 41 y.o. morbidly obese female with a history of chronic hypoxic and hypercapneic resp failure (5-6L), COPD, OHS, CAD, CHF, DM and chronic LE lymphedema and chronic left leg wound who presents with SOB x few days. She endorses chest congestion, cough, clear sputum and wheezing. She also complains of worsening left leg pain and increase drainage. Home health has been coming in and some maggots were removed about 2 weeks ago. She intermittently sees maggots coming out of her wound and she blames her increase pain on them. EMS was dispatched and ER eval again noted maggots in her wound.    Xray leg shows Massive enlargement of the left lower extremity with edema but no gas.  We were asked to admit for work up and evaluation of the above problems. Past Medical History:   Diagnosis Date    Arthritis     Asthma     CAD (coronary artery disease)     Congestive heart failure (HCC)     [facility]     Diabetes ([facility]     Hypertension     Morbid obesity with BMI of 70 and over, adult ([facility]     NSTEMI (non-ST elevated myocardial infarction) ([facility]     SVT (supraventricular tachycardia) ([facility]         Past Surgical History:   Procedure Laterality Date    CARDIAC SURG PROCEDURE UNLIST      Stents    HX  SECTION      HX ORTHOPAEDIC      HX OTHER SURGICAL      cyst removed from back       Social History   Substance Use Topics    Smoking status: Current Every Day Smoker     Packs/day: 0.25     Types: Cigarettes    Smokeless tobacco: Never Used    Alcohol use No        Family History   Problem Relation Age of Onset    Heart Disease Mother     Heart Disease Father     Heart Disease Sister      No Known Allergies     Prior to Admission medications    Medication Sig Start Date End Date Taking? Authorizing Provider   HYDROcodone-acetaminophen (NORCO) 5-325 mg per tablet Take 1 Tab by mouth every four (4) hours as needed for Pain. Max Daily Amount: 6 Tabs. 17  Yes Beatris Alfred MD   albuterol (PROVENTIL VENTOLIN) 2.5 mg /3 mL (0.083 %) nebulizer solution 3 mL by Nebulization route every four (4) hours as needed for Wheezing. 17  Yes Evette Garner MD   furosemide (LASIX) 40 mg tablet Take 40 mg by mouth daily. Yes Landon Raza MD   clotrimazole (LOTRIMIN) 1 % topical cream Apply to the affected areas twice daily until healed 16  Yes Yousuf Betancourt MD   lisinopril (PRINIVIL, ZESTRIL) 40 mg tablet Take 40 mg by mouth daily. Yes Historical Provider   glyBURIDE (DIABETA) 5 mg tablet Take 5 mg by mouth Daily (before breakfast).    Yes Historical Provider   cetirizine (ZYRTEC) 10 mg tablet Take 10 mg by mouth daily as needed for Allergies. Yes Historical Provider   omeprazole (PRILOSEC) 40 mg capsule Take 40 mg by mouth daily. 7/18/15  Yes Landon Raza MD   ammonium lactate (LAC-HYDRIN) 12 % topical cream rub in to affected area well 11/21/14  Yes Evangelista Dallas MD   aspirin delayed-release 81 mg tablet Take 81 mg by mouth daily. Yes Historical Provider   metoprolol (LOPRESSOR) 25 mg tablet Take 25 mg by mouth two (2) times a day. Yes Historical Provider   albuterol (PROVENTIL, VENTOLIN) 90 mcg/actuation inhaler Take 1-2 Puffs by inhalation every four (4) hours as needed for Wheezing. 9/10/13  Yes Trey Vidal MD   cyclobenzaprine (FLEXERIL) 10 mg tablet Take 10 mg by mouth three (3) times daily as needed. Yes Landon Raza MD   predniSONE (STERAPRED DS) 10 mg dose pack As directed 6/12/17   Evette Garner MD   lovastatin (MEVACOR) 40 mg tablet Take 1 Tab by mouth nightly. 1/14/16   Chris Abdi NP   nystatin (MYCOSTATIN) powder Apply  to affected area three (3) times daily. APPLY TO Bilater breasts, pannus, groin after her bath and then two other times during the day. 7/24/15   Karly Watts MD   medroxyPROGESTERone (DEPO-PROVERA) 150 mg/mL injection 150 mg. 5/1/15   Landon Raza MD   ketoconazole (NIZORAL) 2 % topical cream Apply  to affected area daily. Historical Provider   nitroglycerin (NITROSTAT) 0.4 mg SL tablet 1 Tab by SubLINGual route every five (5) minutes as needed for Chest Pain (call 911 if not relieved by 3).  9/12/13   Chris Abdi NP       REVIEW OF SYSTEMS:       Total of 12 systems reviewed as follows:       POSITIVE= underlined text  Negative = text not underlined  General:  fever, chills, sweats, generalized weakness, weight loss/gain,      loss of appetite   Eyes:    blurred vision, eye pain, loss of vision, double vision  ENT:    rhinorrhea, pharyngitis   Respiratory:   cough, sputum production, SOB, RAZO, wheezing, pleuritic pain   Cardiology:   chest pain, palpitations, orthopnea, PND, edema, syncope   Gastrointestinal:  abdominal pain , N/V, diarrhea, dysphagia, constipation, bleeding   Genitourinary:  frequency, urgency, dysuria, hematuria, incontinence   Muskuloskeletal :  arthralgia, myalgia, back pain  Hematology:  easy bruising, nose or gum bleeding, lymphadenopathy   Dermatological: rash, chronic leg ulceration with maggots, pruritis, color change / jaundice  Endocrine:   hot flashes or polydipsia   Neurological:  headache, dizziness, confusion, focal weakness, paresthesia,     Speech difficulties, memory loss, gait difficulty  Psychological: Feelings of anxiety, depression, agitation    Objective:   VITALS:    Visit Vitals    /61    Pulse 90    Temp 98.3 °F (36.8 °C)    Resp 16    Ht 5' 6\" (1.676 m)    Wt (!) 201.9 kg (445 lb)    SpO2 93%    BMI 71.82 kg/m2       PHYSICAL EXAM:    General:    Alert, cooperative, no distress, appears stated age. HEENT: Atraumatic, anicteric sclerae, pink conjunctivae     No oral ulcers, mucosa moist, throat clear, dentition fair  Neck:  Supple, symmetrical,  thyroid: non tender  Lungs:   Coarse BS with scattered rhonchi and mild wheezing  Chest wall:  No tenderness  No Accessory muscle use. Heart:   Regular  rhythm,  No  murmur   No edema  Abdomen:   Soft, non-tender. Not distended. Bowel sounds normal  Extremities: No cyanosis. No clubbing, Skin turgor normal, Capillary refill normal, Radial dial pulse 2+  Skin:     Not pale. Not Jaundiced  (+) lichenified changes with fissuring to both legs due to chronic venous stasis (see photo). Psych:  Good insight. Not depressed. Not anxious or agitated. Neurologic: EOMs intact. No facial asymmetry. No aphasia or slurred speech. Symmetrical strength, Sensation grossly intact.  Alert and oriented X 4.     _______________________________________________________________________  Care Plan discussed with:    Comments   Patient x    Family      RN x    Care Manager Consultant:      _______________________________________________________________________  Expected  Disposition:   Home with Family x   HH/PT/OT/RN x   SNF/LTC    LUIS A    ________________________________________________________________________  TOTAL TIME:   39  [place] Provided     Minutes non procedure based      Comments    x Reviewed previous records   >50% of visit spent in counseling and coordination of care  Discussion with patient and/or family and questions answered       Given the patient's current clinical presentation, I have a high level of concern for decompensation if discharged from the ED. Complex decision making was performed which includes reviewing the patient's available past medical records, laboratory results, and Xray films. I have also directly communicated my plan and discussed this case with the involved ED physician.     ____________________________________________________________________  Jj Campbell MD    Procedures: see electronic medical records for all procedures/Xrays and details which were not copied into this note but were reviewed prior to creation of Plan.     LAB DATA REVIEWED:    Recent Results (from the past 24 hour(s))   EKG, 12 LEAD, INITIAL    Collection Time: 07/24/17  1:51 PM   Result Value Ref Range    Ventricular Rate 97 BPM    Atrial Rate 97 BPM    P-R Interval 170 ms    QRS Duration 84 ms    Q-T Interval 330 ms    QTC Calculation (Bezet) 419 ms    Calculated P Axis 46 degrees    Calculated R Axis 83 degrees    Calculated T Axis 43 degrees    Diagnosis       Normal sinus rhythm  Low voltage QRS  When compared with ECG of 25-JUN-2017 15:12,  premature ventricular complexes are no longer present     CBC WITH AUTOMATED DIFF    Collection Time: 07/24/17  2:23 PM   Result Value Ref Range    WBC 11.7 (H) 3.6 - 11.0 K/uL    RBC 4.65 3.80 - 5.20 M/uL    HGB 13.5 11.5 - 16.0 g/dL    HCT 45.1 35.0 - 47.0 %    MCV 97.0 80.0 - 99.0 FL    MCH 29.0 26.0 - 34.0 PG MCHC 29.9 (L) 30.0 - 36.5 g/dL    RDW 14.1 11.5 - 14.5 %    PLATELET 534 142 - 610 K/uL    NEUTROPHILS 81 (H) 32 - 75 %    LYMPHOCYTES 13 12 - 49 %    MONOCYTES 4 (L) 5 - 13 %    EOSINOPHILS 2 0 - 7 %    BASOPHILS 0 0 - 1 %    ABS. NEUTROPHILS 9.5 (H) 1.8 - 8.0 K/UL    ABS. LYMPHOCYTES 1.5 0.8 - 3.5 K/UL    ABS. MONOCYTES 0.5 0.0 - 1.0 K/UL    ABS. EOSINOPHILS 0.2 0.0 - 0.4 K/UL    ABS.  BASOPHILS 0.0 0.0 - 0.1 K/UL   METABOLIC PANEL, BASIC    Collection Time: 07/24/17  2:23 PM   Result Value Ref Range    Sodium 136 136 - 145 mmol/L    Potassium 4.2 3.5 - 5.1 mmol/L    Chloride 98 97 - 108 mmol/L    CO2 35 (H) 21 - 32 mmol/L    Anion gap 3 (L) 5 - 15 mmol/L    Glucose 78 65 - 100 mg/dL    BUN 12 6 - 20 MG/DL    Creatinine 0.76 0.55 - 1.02 MG/DL    BUN/Creatinine ratio 16 12 - 20      GFR est AA >60 >60 ml/min/1.73m2    GFR est non-AA >60 >60 ml/min/1.73m2    Calcium 10.8 (H) 8.5 - 10.1 MG/DL   MAGNESIUM    Collection Time: 07/24/17  2:23 PM   Result Value Ref Range    Magnesium 1.8 1.6 - 2.4 mg/dL

## 2017-07-24 NOTE — PROGRESS NOTES
Pharmacy Automatic Renal Dosing Protocol - Antimicrobials      Indication for Antimicrobials: Soft Tissue Infection  Current Regimen of Each Antimicrobial (Start Day & Day of Therapy):  Vancomycin 3000 mg x 1 then 2000 mg Q12H ( day 1)  Piperacillin Tazobactam 3.375 Q6H ( day 1)    Significant cultures: ND      CAPD, Intermittent Hemodialysis or Renal Replacement Therapy: No  Paralysis, amputations, malnutrition: No  Recent Labs      17   1423   CREA  0.76   BUN  12   WBC  11.7*     Temp (24hrs), Av.3 °F (36.8 °C), Min:98.3 °F (36.8 °C), Max:98.3 °F (36.8 °C)    Creatinine Clearance (ml/min): 87      Goal Vancomycin Level(s): 10-15 ml/min      Impression/Plan: Vancomycin 2000 mg Q12H with anticipated trough of 15 mcg/ml. Piperacillin tazobactam adjusted to 4.5 grams every eight hours per renal dosing protocol in morbidly obese patient. Pharmacy will follow daily and adjust doses of monitored medications as appropriate for renal function and/or serum levels.     Thank you,  Melissa Ridley, [facility]

## 2017-07-24 NOTE — ED PROVIDER NOTES
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 Pt also complains of a draining painful wound on her left posterior leg that has \"been there off and on for a while\" that is shown to have maggots. She does have breathing treatments that she uses at home and notes to have just started Amoxicillin prescribed by her PCP, who had referred her to come to the ED. Pt also has a wound care nurse. PCP:Landon Raza MD    Social Hx: + smoking (0.25 ppd); - EtOH; - illicit drug use    There are no other complains, changes, or physical findings at this time. The history is provided by the patient. Past Medical History:   Diagnosis Date    Arthritis     Asthma     CAD (coronary artery disease)     Congestive heart failure (HCC)     COPD (chronic obstructive pulmonary disease) ([address]     Diabetes [facility]     Hypertension     Morbid obesity with BMI of 70 and over, adult ([address]     NSTEMI (non-ST elevated myocardial infarction) ([address]     SVT (supraventricular tachycardia) ([address]        Past Surgical History:   Procedure Laterality Date    CARDIAC SURG PROCEDURE UNLIST      Stents    HX  SECTION      HX ORTHOPAEDIC      HX OTHER SURGICAL      cyst removed from back         Family History:   Problem Relation Age of Onset    Heart Disease Mother     Heart Disease Father     Heart Disease Sister        Social History     Social History    Marital status: SINGLE     Spouse name: N/A    Number of children: N/A    Years of education: N/A     Occupational History    Not on file.      Social History Main Topics    Smoking status: Current Every Day Smoker     Packs/day: 0.25     Types: Cigarettes    Smokeless tobacco: Never Used    Alcohol use No    Drug use: No    Sexual activity: Not on file     Other Topics Concern    Not on file     Social History Narrative    ** Merged History Encounter **              ALLERGIES: Review of patient's allergies indicates no known allergies. Review of Systems   Constitutional: Positive for chills and fever (subjective). HENT: Negative for congestion, ear pain, rhinorrhea, sore throat and trouble swallowing. Eyes: Negative for visual disturbance. Respiratory: Positive for cough and shortness of breath. Negative for chest tightness. Cardiovascular: Negative for chest pain and palpitations. Gastrointestinal: Negative for abdominal pain, blood in stool, constipation, diarrhea, nausea and vomiting. Genitourinary: Negative for decreased urine volume, difficulty urinating, dysuria and frequency. Musculoskeletal: Negative for back pain and neck pain. Skin: Positive for wound (left lower leg). Negative for color change and rash. Neurological: Negative for dizziness, weakness, light-headedness and headaches. Patient Vitals for the past 12 hrs:   Temp Pulse Resp BP SpO2   07/24/17 1417 - - - - 99 %   07/24/17 1400 - (!) 109 18 112/42 96 %   07/24/17 1350 98.3 °F (36.8 °C) (!) 106 14 100/55 99 %       Physical Exam   Constitutional: She is oriented to person, place, and time. Vital signs are normal. She appears well-developed and well-nourished. She does not appear ill. No distress. Pt is morbidly obese. HENT:   Mouth/Throat: Oropharynx is clear and moist.   Eyes: Conjunctivae are normal.   Neck: Neck supple. Cardiovascular: Normal rate and regular rhythm. Pulmonary/Chest: Effort normal and breath sounds normal. No accessory muscle usage. No respiratory distress. Decreased breath sounds bilaterally. Abdominal: Soft. She exhibits no distension. There is no tenderness. Musculoskeletal:   Bilateral lower extremity edema that is chronic with scalene. Left leg is significantly more swollen than her right. Lymphadenopathy:     She has no cervical adenopathy. Neurological: She is alert and oriented to person, place, and time. She has normal strength. No cranial nerve deficit or sensory deficit. Skin: Skin is warm and dry. Wound on the posterior left leg with purulent drainage and maggots growing in it. Nursing note and vitals reviewed. MDM  Number of Diagnoses or Management Options  Acute exacerbation of chronic obstructive pulmonary disease (COPD) ([address]):   Cellulitis of left lower leg:   Maggot infestation:   Morbid obesity with alveolar hypoventilation [facility]):   Diagnosis management comments: DDx: leg infection, cellulitis, COPD exacerbation       Amount and/or Complexity of Data Reviewed  Clinical lab tests: ordered and reviewed  Tests in the radiology section of CPT®: ordered and reviewed  Tests in the medicine section of CPT®: ordered and reviewed  Review and summarize past medical records: yes  Discuss the patient with other providers: yes (Hospitalist)  Independent visualization of images, tracings, or specimens: yes    Patient Progress  Patient progress: stable    ED Course       Procedures    EKG interpretation: (Preliminary)  1:51 PM  Rhythm: normal sinus rhythm; and regular . Rate (approx.): 97 bpm; Axis: normal; NH interval: normal; QRS interval: normal ; ST/T wave: normal; Other findings: normal.  This note is prepared by Royden Pallas, acting as Scribe for Regina Davis MD.    CONSULT NOTE:   4:11 PM  Regina Davis MD spoke with Jona Cerda MD,   Specialty: Hospitalist  Discussed pt's hx, disposition, and available diagnostic and imaging results. Reviewed care plans. Consultant will evaluate pt for admission.   Written by Royden Pallas, ED Scribe, as dictated by Regina Davis MD.    LABORATORY TESTS:  Recent Results (from the past 12 hour(s))   EKG, 12 LEAD, INITIAL    Collection Time: 07/24/17  1:51 PM   Result Value Ref Range    Ventricular Rate 97 BPM    Atrial Rate 97 BPM    P-R Interval 170 ms    QRS Duration 84 ms    Q-T Interval 330 ms    QTC Calculation (Bezet) 419 ms    Calculated P Axis 46 degrees    Calculated R Axis 83 degrees    Calculated T Axis 43 degrees    Diagnosis       Normal sinus rhythm  Low voltage QRS  When compared with ECG of 25-JUN-2017 15:12,  premature ventricular complexes are no longer present     CBC WITH AUTOMATED DIFF    Collection Time: 07/24/17  2:23 PM   Result Value Ref Range    WBC 11.7 (H) 3.6 - 11.0 K/uL    RBC 4.65 3.80 - 5.20 M/uL    HGB 13.5 11.5 - 16.0 g/dL    HCT 45.1 35.0 - 47.0 %    MCV 97.0 80.0 - 99.0 FL    MCH 29.0 26.0 - 34.0 PG    MCHC 29.9 (L) 30.0 - 36.5 g/dL    RDW 14.1 11.5 - 14.5 %    PLATELET 161 404 - 138 K/uL    NEUTROPHILS 81 (H) 32 - 75 %    LYMPHOCYTES 13 12 - 49 %    MONOCYTES 4 (L) 5 - 13 %    EOSINOPHILS 2 0 - 7 %    BASOPHILS 0 0 - 1 %    ABS. NEUTROPHILS 9.5 (H) 1.8 - 8.0 K/UL    ABS. LYMPHOCYTES 1.5 0.8 - 3.5 K/UL    ABS. MONOCYTES 0.5 0.0 - 1.0 K/UL    ABS. EOSINOPHILS 0.2 0.0 - 0.4 K/UL    ABS. BASOPHILS 0.0 0.0 - 0.1 K/UL   METABOLIC PANEL, BASIC    Collection Time: 07/24/17  2:23 PM   Result Value Ref Range    Sodium 136 136 - 145 mmol/L    Potassium 4.2 3.5 - 5.1 mmol/L    Chloride 98 97 - 108 mmol/L    CO2 35 (H) 21 - 32 mmol/L    Anion gap 3 (L) 5 - 15 mmol/L    Glucose 78 65 - 100 mg/dL    BUN 12 6 - 20 MG/DL    Creatinine 0.76 0.55 - 1.02 MG/DL    BUN/Creatinine ratio 16 12 - 20      GFR est AA >60 >60 ml/min/1.73m2    GFR est non-AA >60 >60 ml/min/1.73m2    Calcium 10.8 (H) 8.5 - 10.1 MG/DL   MAGNESIUM    Collection Time: 07/24/17  2:23 PM   Result Value Ref Range    Magnesium 1.8 1.6 - 2.4 mg/dL       IMAGING RESULTS:    CXR Results  (Last 48 hours)               07/24/17 1500  XR CHEST PORT Final result    Impression:  Impression:   No acute cardiopulmonary process. Narrative:  Chest portable AP       History: Dyspnea       Comparison: 6/25/2017       Findings: The film is partially underpenetrated secondary to patient body   habitus.  The lungs are well expanded. No focal consolidation, pleural effusion,   or pneumothorax. The heart is mildly enlarged, but unchanged. The visualized   osseous structures are unremarkable. EXAM:  XR TIB/FIB LT     INDICATION:  infection.     COMPARISON: None.     FINDINGS: AP and lateral  views of the left tibia and fibula. No acute fracture  or dislocation. No evidence of osteomyelitis. There is massive enlargement of  the soft tissues of the left lower extremity with reticulation likely related to  edema. No evidence of subcutaneous emphysema.     IMPRESSION  IMPRESSION: Massive enlargement of the left lower extremity with edema.              MEDICATIONS GIVEN:  Medications   vancomycin (VANCOCIN) 3,000 mg in 0.9% sodium chloride 500 mL IVPB (3,000 mg IntraVENous Given 7/24/17 1638)   piperacillin-tazobactam (ZOSYN) 3.375 g in 0.9% sodium chloride (MBP/ADV) 100 mL (0 g IntraVENous IV Completed 7/24/17 1639)   albuterol-ipratropium (DUO-NEB) 2.5 MG-0.5 MG/3 ML (3 mL Nebulization Given 7/24/17 1523)       IMPRESSION:  1. Cellulitis of left lower leg    2. Maggot infestation    3. Acute exacerbation of chronic obstructive pulmonary disease (COPD) ([facility]    4. Morbid obesity with alveolar hypoventilation (HCC)        PLAN:  1. Admit to Hospitalist    ADMIT NOTE:  4:13 PM  Patient is being admitted to the hospital by Dr. Maria G Lema. The results of their tests and reasons for their admission have been discussed with them and/or available family. They convey agreement and understanding for the need to be admitted and for their admission diagnosis. Consultation has been made with the inpatient physician specialist for hospitalization. ATTESTATION:  This note is prepared by Mariya Evans, acting as Scribe for Slava Castro MD.    Slava Castro MD: The scribe's documentation has been prepared under my direction and personally reviewed by me in its entirety.  I confirm that the note above accurately reflects all work, treatment, procedures, and medical decision making performed by me.

## 2017-07-24 NOTE — ED NOTES
TRANSFER - OUT REPORT:    Verbal report given to Cassy(name) on Yahaira Maciel  being transferred to oncology(unit) for routine progression of care       Report consisted of patients Situation, Background, Assessment and   Recommendations(SBAR). Information from the following report(s) SBAR, Kardex, ED Summary, Procedure Summary, Intake/Output and MAR was reviewed with the receiving nurse. Lines:   Peripheral IV 07/24/17 Right Antecubital (Active)   Site Assessment Clean, dry, & intact 7/24/2017  2:31 PM   Phlebitis Assessment 0 7/24/2017  2:31 PM   Infiltration Assessment 0 7/24/2017  2:31 PM   Dressing Status Clean, dry, & intact 7/24/2017  2:31 PM   Dressing Type Transparent 7/24/2017  2:31 PM   Hub Color/Line Status Pink;Flushed 7/24/2017  2:31 PM   Action Taken Blood drawn 7/24/2017  2:31 PM        Opportunity for questions and clarification was provided.       Patient transported with:   [employer]

## 2017-07-24 NOTE — IP AVS SNAPSHOT
[address] [facility] 
[phone] Patient: Jerome Sellers MRN: [id number] SKM:[id number] Current Discharge Medication List  
  
START taking these medications Dose & Instructions Dispensing Information Comments Morning Noon Evening Bedtime  
 guaiFENesin  mg ER tablet Commonly known as:  Nasim Ta Your last dose was: Your next dose is:    
   
   
 Dose:  600 mg Take 1 Tab by mouth two (2) times a day for 10 days. Quantity:  20 Tab Refills:  0  
     
   
   
   
  
 l.acidoph-B.lactis-B.longum 460 mg (7.5-6- 1.5 bill. cell) Cap cap Commonly known as:  [id number] Your last dose was: Your next dose is:    
   
   
 Dose:  1 Cap Take 1 Cap by mouth Daily (before breakfast). Quantity:  7 Cap Refills:  0  
     
   
   
   
  
 levoFLOXacin 750 mg tablet Commonly known as:  Hi Gores Your last dose was: Your next dose is:    
   
   
 Dose:  750 mg Take 1 Tab by mouth every twenty-four (24) hours for 7 days. Quantity:  7 Tab Refills:  0  
     
   
   
   
  
 metroNIDAZOLE 500 mg tablet Commonly known as:  FLAGYL Your last dose was: Your next dose is:    
   
   
 Dose:  500 mg Take 1 Tab by mouth three (3) times daily for 7 days. Quantity:  21 Tab Refills:  0 CONTINUE these medications which have NOT CHANGED Dose & Instructions Dispensing Information Comments Morning Noon Evening Bedtime  
 albuterol 2.5 mg /3 mL (0.083 %) nebulizer solution Commonly known as:  PROVENTIL VENTOLIN Your last dose was: Your next dose is:    
   
   
 Dose:  2.5 mg  
3 mL by Nebulization route every four (4) hours as needed for Wheezing. Quantity:  24 Each Refills:  0  
     
   
   
   
  
 albuterol 90 mcg/actuation inhaler Commonly known as:  Izella Lacrosse Your last dose was: Your next dose is:    
   
   
 Dose:  1-2 Puff Take 1-2 Puffs by inhalation every four (4) hours as needed for Wheezing. Quantity:  17 g Refills:  0  
     
   
   
   
  
 ammonium lactate 12 % topical cream  
Commonly known as:  LAC-HYDRIN Your last dose was: Your next dose is:    
   
   
 rub in to affected area well Quantity:  280 g Refills:  1  
     
   
   
   
  
 aspirin delayed-release 81 mg tablet Your last dose was: Your next dose is:    
   
   
 Dose:  81 mg Take 81 mg by mouth daily. Refills:  0  
     
   
   
   
  
 cetirizine 10 mg tablet Commonly known as:  ZYRTEC Your last dose was: Your next dose is:    
   
   
 Dose:  10 mg Take 10 mg by mouth daily as needed for Allergies. Refills:  0  
     
   
   
   
  
 clotrimazole 1 % topical cream  
Commonly known as:  Quenten Purl Your last dose was: Your next dose is:    
   
   
 Apply to the affected areas twice daily until healed Quantity:  15 g Refills:  0  
     
   
   
   
  
 furosemide 40 mg tablet Commonly known as:  LASIX Your last dose was: Your next dose is:    
   
   
 Dose:  40 mg Take 40 mg by mouth daily. Refills:  0  
     
   
   
   
  
 glyBURIDE 5 mg tablet Commonly known as:  Bharat Bless Your last dose was: Your next dose is:    
   
   
 Dose:  5 mg Take 5 mg by mouth Daily (before breakfast). Refills:  0 HYDROcodone-acetaminophen 5-325 mg per tablet Commonly known as:  Kimberli Blackmanck Your last dose was: Your next dose is:    
   
   
 Dose:  1 Tab Take 1 Tab by mouth every four (4) hours as needed for Pain. Max Daily Amount: 6 Tabs. Quantity:  20 Tab Refills:  0  
     
   
   
   
  
 ketoconazole 2 % topical cream  
Commonly known as:  NIZORAL Your last dose was: Your next dose is:    
   
   
 Apply  to affected area daily. Refills:  0 lisinopril 40 mg tablet Commonly known as:  Delaney Dowdy Your last dose was: Your next dose is:    
   
   
 Dose:  40 mg Take 40 mg by mouth daily. Refills:  0  
     
   
   
   
  
 lovastatin 40 mg tablet Commonly known as:  MEVACOR Your last dose was: Your next dose is:    
   
   
 Dose:  40 mg Take 1 Tab by mouth nightly. Quantity:  30 Tab Refills:  6  
     
   
   
   
  
 medroxyPROGESTERone 150 mg/mL injection Commonly known as:  DEPO-PROVERA Your last dose was: Your next dose is:    
   
   
 Dose:  150 mg  
150 mg. Refills:  0  
     
   
   
   
  
 metoprolol tartrate 25 mg tablet Commonly known as:  LOPRESSOR Your last dose was: Your next dose is:    
   
   
 Dose:  25 mg Take 25 mg by mouth two (2) times a day. Refills:  0  
     
   
   
   
  
 nitroglycerin 0.4 mg SL tablet Commonly known as:  NITROSTAT Your last dose was: Your next dose is:    
   
   
 Dose:  0.4 mg  
1 Tab by SubLINGual route every five (5) minutes as needed for Chest Pain (call 911 if not relieved by 3). Quantity:  25 Tab Refills:  2  
     
   
   
   
  
 nystatin powder Commonly known as:  MYCOSTATIN Your last dose was: Your next dose is:    
   
   
 Apply  to affected area three (3) times daily. APPLY TO Bilater breasts, pannus, groin after her bath and then two other times during the day. Quantity:  1 Bottle Refills:  0  
     
   
   
   
  
 omeprazole 40 mg capsule Commonly known as:  PRILOSEC Your last dose was: Your next dose is:    
   
   
 Dose:  40 mg Take 40 mg by mouth daily. Refills:  0  
     
   
   
   
  
 predniSONE 10 mg dose pack Commonly known as:  STERAPRED DS Your last dose was: Your next dose is: As directed Quantity:  21 Tab Refills:  0 STOP taking these medications FLEXERIL 10 mg tablet Generic drug:  cyclobenzaprine Where to Get Your Medications Information on where to get these meds will be given to you by the nurse or doctor. ! Ask your nurse or doctor about these medications  
  guaiFENesin  mg ER tablet HYDROcodone-acetaminophen 5-325 mg per tablet  
 l.acidoph-B.lactis-B.longum 460 mg (7.5-6- 1.5 bill. cell) Cap cap  
 levoFLOXacin 750 mg tablet  
 metroNIDAZOLE 500 mg tablet  
 predniSONE 10 mg dose pack

## 2017-07-25 PROBLEM — B87.9 MAGGOT INFESTATION: Chronic | Status: ACTIVE | Noted: 2017-07-25

## 2017-07-25 LAB
ANION GAP BLD CALC-SCNC: 2 MMOL/L (ref 5–15)
BUN SERPL-MCNC: 14 MG/DL (ref 6–20)
BUN/CREAT SERPL: 19 (ref 12–20)
CALCIUM SERPL-MCNC: 10 MG/DL (ref 8.5–10.1)
CHLORIDE SERPL-SCNC: 100 MMOL/L (ref 97–108)
CO2 SERPL-SCNC: 34 MMOL/L (ref 21–32)
CREAT SERPL-MCNC: 0.74 MG/DL (ref 0.55–1.02)
EST. AVERAGE GLUCOSE BLD GHB EST-MCNC: 105 MG/DL
GLUCOSE BLD STRIP.AUTO-MCNC: 110 MG/DL (ref 65–100)
GLUCOSE BLD STRIP.AUTO-MCNC: 214 MG/DL (ref 65–100)
GLUCOSE BLD STRIP.AUTO-MCNC: 218 MG/DL (ref 65–100)
GLUCOSE BLD STRIP.AUTO-MCNC: 253 MG/DL (ref 65–100)
GLUCOSE SERPL-MCNC: 105 MG/DL (ref 65–100)
HBA1C MFR BLD: 5.3 % (ref 4.2–6.3)
POTASSIUM SERPL-SCNC: 4.2 MMOL/L (ref 3.5–5.1)
SERVICE CMNT-IMP: ABNORMAL
SODIUM SERPL-SCNC: 136 MMOL/L (ref 136–145)

## 2017-07-25 PROCEDURE — 77030018836 HC SOL IRR NACL ICUM -A

## 2017-07-25 PROCEDURE — 77010033678 HC OXYGEN DAILY

## 2017-07-25 PROCEDURE — 74011636637 HC RX REV CODE- 636/637: Performed by: INTERNAL MEDICINE

## 2017-07-25 PROCEDURE — 74011250636 HC RX REV CODE- 250/636: Performed by: INTERNAL MEDICINE

## 2017-07-25 PROCEDURE — 74011000258 HC RX REV CODE- 258: Performed by: INTERNAL MEDICINE

## 2017-07-25 PROCEDURE — 74011250637 HC RX REV CODE- 250/637: Performed by: INTERNAL MEDICINE

## 2017-07-25 PROCEDURE — 74011250637 HC RX REV CODE- 250/637: Performed by: HOSPITALIST

## 2017-07-25 PROCEDURE — 77030019607 HC DSG BURN S&N -A

## 2017-07-25 PROCEDURE — 82962 GLUCOSE BLOOD TEST: CPT

## 2017-07-25 PROCEDURE — 36415 COLL VENOUS BLD VENIPUNCTURE: CPT | Performed by: EMERGENCY MEDICINE

## 2017-07-25 PROCEDURE — 94640 AIRWAY INHALATION TREATMENT: CPT

## 2017-07-25 PROCEDURE — 83036 HEMOGLOBIN GLYCOSYLATED A1C: CPT | Performed by: INTERNAL MEDICINE

## 2017-07-25 PROCEDURE — 74011250636 HC RX REV CODE- 250/636: Performed by: EMERGENCY MEDICINE

## 2017-07-25 PROCEDURE — 74011000250 HC RX REV CODE- 250: Performed by: INTERNAL MEDICINE

## 2017-07-25 PROCEDURE — 65270000029 HC RM PRIVATE

## 2017-07-25 PROCEDURE — 76937 US GUIDE VASCULAR ACCESS: CPT

## 2017-07-25 PROCEDURE — 80048 BASIC METABOLIC PNL TOTAL CA: CPT | Performed by: EMERGENCY MEDICINE

## 2017-07-25 PROCEDURE — 77030027138 HC INCENT SPIROMETER -A

## 2017-07-25 RX ORDER — CYCLOBENZAPRINE HCL 10 MG
5 TABLET ORAL
Status: DISCONTINUED | OUTPATIENT
Start: 2017-07-25 | End: 2017-07-27 | Stop reason: HOSPADM

## 2017-07-25 RX ORDER — ENOXAPARIN SODIUM 100 MG/ML
40 INJECTION SUBCUTANEOUS EVERY 12 HOURS
Status: DISCONTINUED | OUTPATIENT
Start: 2017-07-25 | End: 2017-07-27 | Stop reason: HOSPADM

## 2017-07-25 RX ORDER — INSULIN LISPRO 100 [IU]/ML
INJECTION, SOLUTION INTRAVENOUS; SUBCUTANEOUS
Status: DISCONTINUED | OUTPATIENT
Start: 2017-07-25 | End: 2017-07-27 | Stop reason: HOSPADM

## 2017-07-25 RX ORDER — FUROSEMIDE 40 MG/1
40 TABLET ORAL
Status: DISCONTINUED | OUTPATIENT
Start: 2017-07-25 | End: 2017-07-27 | Stop reason: HOSPADM

## 2017-07-25 RX ORDER — NYSTATIN 100000 [USP'U]/G
POWDER TOPICAL
Status: DISCONTINUED | OUTPATIENT
Start: 2017-07-25 | End: 2017-07-27 | Stop reason: HOSPADM

## 2017-07-25 RX ORDER — AMMONIUM LACTATE 12 G/100G
LOTION TOPICAL 2 TIMES DAILY
Status: DISCONTINUED | OUTPATIENT
Start: 2017-07-25 | End: 2017-07-27 | Stop reason: HOSPADM

## 2017-07-25 RX ADMIN — SODIUM HYPOCHLORITE: 2.5 SOLUTION TOPICAL at 20:00

## 2017-07-25 RX ADMIN — GUAIFENESIN 600 MG: 600 TABLET, EXTENDED RELEASE ORAL at 18:40

## 2017-07-25 RX ADMIN — GUAIFENESIN 600 MG: 600 TABLET, EXTENDED RELEASE ORAL at 10:18

## 2017-07-25 RX ADMIN — HEPARIN SODIUM 5000 UNITS: 5000 INJECTION, SOLUTION INTRAVENOUS; SUBCUTANEOUS at 11:58

## 2017-07-25 RX ADMIN — FUROSEMIDE 40 MG: 40 TABLET ORAL at 10:19

## 2017-07-25 RX ADMIN — VANCOMYCIN HYDROCHLORIDE 2000 MG: 10 INJECTION, POWDER, LYOPHILIZED, FOR SOLUTION INTRAVENOUS at 23:00

## 2017-07-25 RX ADMIN — ASPIRIN 81 MG: 81 TABLET, COATED ORAL at 10:19

## 2017-07-25 RX ADMIN — IPRATROPIUM BROMIDE AND ALBUTEROL SULFATE 3 ML: .5; 3 SOLUTION RESPIRATORY (INHALATION) at 09:37

## 2017-07-25 RX ADMIN — Medication 10 ML: at 15:12

## 2017-07-25 RX ADMIN — Medication 10 ML: at 05:21

## 2017-07-25 RX ADMIN — HEPARIN SODIUM 5000 UNITS: 5000 INJECTION, SOLUTION INTRAVENOUS; SUBCUTANEOUS at 05:20

## 2017-07-25 RX ADMIN — GLYBURIDE 5 MG: 5 TABLET ORAL at 10:19

## 2017-07-25 RX ADMIN — ENOXAPARIN SODIUM 40 MG: 40 INJECTION SUBCUTANEOUS at 15:10

## 2017-07-25 RX ADMIN — Medication 10 ML: at 02:13

## 2017-07-25 RX ADMIN — IPRATROPIUM BROMIDE AND ALBUTEROL SULFATE 3 ML: .5; 3 SOLUTION RESPIRATORY (INHALATION) at 14:44

## 2017-07-25 RX ADMIN — PIPERACILLIN SODIUM,TAZOBACTAM SODIUM 4.5 G: 4; .5 INJECTION, POWDER, FOR SOLUTION INTRAVENOUS at 02:12

## 2017-07-25 RX ADMIN — METOPROLOL TARTRATE 25 MG: 25 TABLET ORAL at 10:20

## 2017-07-25 RX ADMIN — VANCOMYCIN HYDROCHLORIDE 2000 MG: 10 INJECTION, POWDER, LYOPHILIZED, FOR SOLUTION INTRAVENOUS at 10:03

## 2017-07-25 RX ADMIN — HYDROCODONE BITARTRATE AND ACETAMINOPHEN 1 TABLET: 5; 325 TABLET ORAL at 11:58

## 2017-07-25 RX ADMIN — LISINOPRIL 40 MG: 20 TABLET ORAL at 10:19

## 2017-07-25 RX ADMIN — IPRATROPIUM BROMIDE AND ALBUTEROL SULFATE 3 ML: .5; 3 SOLUTION RESPIRATORY (INHALATION) at 01:44

## 2017-07-25 RX ADMIN — PIPERACILLIN SODIUM,TAZOBACTAM SODIUM 4.5 G: 4; .5 INJECTION, POWDER, FOR SOLUTION INTRAVENOUS at 10:03

## 2017-07-25 RX ADMIN — METHYLPREDNISOLONE SODIUM SUCCINATE 80 MG: 40 INJECTION, POWDER, FOR SOLUTION INTRAMUSCULAR; INTRAVENOUS at 10:09

## 2017-07-25 RX ADMIN — METHYLPREDNISOLONE SODIUM SUCCINATE 80 MG: 40 INJECTION, POWDER, FOR SOLUTION INTRAMUSCULAR; INTRAVENOUS at 15:10

## 2017-07-25 RX ADMIN — INSULIN LISPRO 4 UNITS: 100 INJECTION, SOLUTION INTRAVENOUS; SUBCUTANEOUS at 21:37

## 2017-07-25 RX ADMIN — FUROSEMIDE 40 MG: 40 TABLET ORAL at 17:01

## 2017-07-25 RX ADMIN — PIPERACILLIN SODIUM,TAZOBACTAM SODIUM 4.5 G: 4; .5 INJECTION, POWDER, FOR SOLUTION INTRAVENOUS at 21:29

## 2017-07-25 RX ADMIN — CYCLOBENZAPRINE HYDROCHLORIDE 5 MG: 10 TABLET, FILM COATED ORAL at 01:31

## 2017-07-25 RX ADMIN — METHYLPREDNISOLONE SODIUM SUCCINATE 80 MG: 40 INJECTION, POWDER, FOR SOLUTION INTRAMUSCULAR; INTRAVENOUS at 21:29

## 2017-07-25 RX ADMIN — Medication: at 19:04

## 2017-07-25 RX ADMIN — IPRATROPIUM BROMIDE AND ALBUTEROL SULFATE 3 ML: .5; 3 SOLUTION RESPIRATORY (INHALATION) at 20:44

## 2017-07-25 RX ADMIN — INSULIN LISPRO 3 UNITS: 100 INJECTION, SOLUTION INTRAVENOUS; SUBCUTANEOUS at 11:57

## 2017-07-25 RX ADMIN — INSULIN LISPRO 4 UNITS: 100 INJECTION, SOLUTION INTRAVENOUS; SUBCUTANEOUS at 16:59

## 2017-07-25 RX ADMIN — METOPROLOL TARTRATE 25 MG: 25 TABLET ORAL at 18:40

## 2017-07-25 RX ADMIN — SODIUM HYPOCHLORITE: 2.5 SOLUTION TOPICAL at 14:00

## 2017-07-25 RX ADMIN — Medication 10 ML: at 21:29

## 2017-07-25 NOTE — PROGRESS NOTES
ADULT PROTOCOL: JET AEROSOL ASSESSMENT    Patient  Jay Segundo     41 y.o.   female     7/25/2017  9:55 AM    Breath Sounds Pre Procedure: Right Breath Sounds: Diminished, Coarse                               Left Breath Sounds: Diminished, Coarse    Breath Sounds Post Procedure: Right Breath Sounds: Diminished, Coarse                                 Left Breath Sounds: Diminished, Coarse    Breathing pattern: Pre procedure Breathing Pattern: Regular          Post procedure Breathing Pattern: Regular    Heart Rate: Pre procedure Pulse: 109           Post procedure Pulse: 122    Resp Rate: Pre procedure Respirations: 20           Post procedure Respirations: 20      Oxygen: O2 Device: Nasal cannula   Flow rate (L/min) 6     Changed: No, pt on 6lpm NC @ home    SpO2: Pre procedure SpO2: 93 %   6lpm NC              Post procedure SpO2: 95 %  6lpm NC    Nebulizer Therapy: Current medications Aerosolized Medications: DuoNeb      Changed: NO    Smoking History:   Smoking status: Current Every Day Smoker        Packs/day: 0.25       Types: Cigarettes         Problem List:   Patient Active Problem List   Diagnosis Code    CHF (congestive heart failure) ([facility]) I50.9    Malignant essential hypertension I10    Asthma J45.909    Obesity hypoventilation syndrome ([facility]) E66.2    Dyspnea R06.00    Chest pressure R07.89    Acute on chronic diastolic heart failure ([facility]) I50.33    SVT (supraventricular tachycardia) ([facility]) I47.1    NSTEMI (non-ST elevated myocardial infarction) ([facility]) I21.4    S/P PTCA (percutaneous transluminal coronary angioplasty) Z98.61    Coronary atherosclerosis of native coronary artery I25.10    Hypoxia R09.02    Noncompliance with therapeutic plan Z91.11    Chest pain R07.9    GERD (gastroesophageal reflux disease) K21.9    Acute respiratory failure with hypoxia and hypercarbia ([facility]) J96.01, J96.02    COPD (chronic obstructive pulmonary disease) ([facility]) J44.9    Tobacco abuse Z72.0    Obesity, morbid (more than 100 lbs over ideal weight or BMI > 40) ([facility]) E66.01    Cellulitis L03.90    SIRS due to infectious process with acute organ dysfunction ([facility]) A41.9, R65.20    Sepsis associated hypotension ([facility]) A41.9    Gangrenous toe ([facility]) I96    Type II diabetes mellitus, uncontrolled ([facility]) E11.65    HTN (hypertension) X67    Diastolic CHF, chronic ([facility]) I50.32    Cough with hemoptysis R04.2    Lymphedema of both lower extremities I89.0    CAP (community acquired pneumonia) J18.9    Cellulitis, leg L03.119    COPD exacerbation ([facility]) J44.1    Maggot infestation B87.9       Respiratory Therapist: Kavya Simeon, RT

## 2017-07-25 NOTE — PROGRESS NOTES
TRANSFER - OUT REPORT:    Verbal report given to Harpreet(name) on Yahaira Edmonds  being transferred to Renal(unit) for routine progression of care       Report consisted of patients Situation, Background, Assessment and   Recommendations(SBAR). Information from the following report(s) SBAR, Kardex, Intake/Output, MAR, Accordion and Recent Results was reviewed with the receiving nurse. Lines:   Peripheral IV 07/25/17 Left; Inner Forearm (Active)   Site Assessment Clean, dry, & intact 7/25/2017  8:12 AM   Phlebitis Assessment 0 7/25/2017  8:12 AM   Infiltration Assessment 0 7/25/2017  8:12 AM   Dressing Status Clean, dry, & intact 7/25/2017  8:12 AM   Dressing Type Transparent;Tape 7/25/2017  8:12 AM   Hub Color/Line Status Blue 7/25/2017  8:12 AM   Action Taken Blood drawn 7/25/2017  5:30 AM        Opportunity for questions and clarification was provided.       Patient transported with:   [facility]

## 2017-07-25 NOTE — WOUND CARE
Wound Care Consult: Chart reviewed and patient assessed for her left left wound that was present on admission. Pt. Was admitted with respiratory issues with wheezing. Pt. Is 443 lbs. She has chronic respiratory problems since she was a teenager. Wound care was called to see this patient for the wound on the left leg that is also infected with maggots. This is not new to this patient. She receives home care from [facility] several times a week for about one year for this wound. Assessment: The wound is actually a cluster of skin cells that are edematous and covering a deeper wound that is bleeding or raw underneath of it. The skin is pink and smooth. The maggots are scant right now. I saw 6-7 of them and flooded the entire lower leg fold with Dakin Solution to osman them out of the crevice. The skin was scrubbed with the gauze and patient had no c/o pain with this. The wound area was dressed with a Dakin solution moistened kerlix type gauze and covered with an absorbent dressing. Wrapped with Kerlix and then Gulshan x 2 rolls. Patient is mobile on her feet. Plan: Cleanse with CHG soap and water and then apply Dakin solution moistened Kerlix wrap on the wound like a Wet to dry dressing -  to be done twice daily while she is here at [facility]. Apply a generous amount of Lac Hydrin Lotion to the skin while her skin is still damp. Allow it to absorb into the skin well. The goal is to make it undesirable for the maggots to be in the wound. I spoke with her about how to cleanse her skin at home. Pt. To be moved to the Renal unit to accommodate a Compella bariatric bed.     Berkley Kidd RN, BSN, CWON  ([id number])

## 2017-07-25 NOTE — PROGRESS NOTES
TRANSFER - IN REPORT:    Verbal report received from Tk Cedeño (name) on Jerome Sellers  being received from Oncology (unit) for routine progression of care      Report consisted of patients Situation, Background, Assessment and   Recommendations(SBAR). Information from the following report(s) SBAR, Kardex, Intake/Output, MAR and Recent Results was reviewed with the receiving nurse. Opportunity for questions and clarification was provided. Assessment completed upon patients arrival to unit and care assumed. PICC team called to place IV on pt (RN from oncology notified me that pt's IV was not working. RN sent vancomycin that was started at 10 am this morning and was not finished. Vancomycin restarted at 18:37 after PICC team inserted PIV. Primary Nurse Arjun Francis RN and Yuan Castellon RN performed a dual skin assessment on this patient Impairment noted- wound to L lower leg, dry, calloused skin noted. Jeffrey score is 19.

## 2017-07-25 NOTE — PROGRESS NOTES
Pt. Admitted from the ER  with Cellulitis of LLE and COPD exacerbation, with sats in the low 70's. Pt. Uses a CPAP at home, but states it is broken. She also has hx. Of DM, CAD with stents, Chronic Diastolic HF, Morbid Obesity--wt. 443 lbs., and lymphedema of LLE. Pt. Has a wound LLE that has been followed by [facility] wound nurse for a year. Today she presented to the ER with maggot infestation of wound. [facility] wound care nurse has seen pt. And cleaned wound and dressed it. This will be done BID. Pt. Is currently up in the chair. She will be transferred to [facility], because she needs a bariatric bed, and the rooms on this unit are too small. CM will follow and assist , as needed. Armin SCHNEIDER,EN,[facility]--[phone]    Care Management Interventions  PCP Verified by CM:  Yes (Dr. Brandyn Presley ODEVIOH-[phone])  Mode of Transport at Discharge: [facility]  Transition of Care Consult (CM Consult): Home Health, Discharge Planning  [place] [id number] [address] [address]: No  Reason Outside [place]: Patient already serviced by other home care/hospice agency ([facility])  MyChart Signup: No  Discharge Durable Medical Equipment: No  Health Maintenance Reviewed: Yes  Physical Therapy Consult: Yes  Occupational Therapy Consult: Yes  Speech Therapy Consult: No  Current Support Network: Lives Alone, Own Home  Confirm Follow Up Transport: Family

## 2017-07-25 NOTE — PROGRESS NOTES
Bedside shift change report given to [address] [place] [address] (oncoming nurse) by Deshawn Wagner (offgoing nurse). Report included the following information SBAR, Kardex, Intake/Output, MAR and Recent Results. Zone Phone for oncoming shift:   0845    Shift Summary: Pt transferred from oncology today. Vancomycin restarted after IV insertion. LDAs               Peripheral IV 07/25/17 Left Forearm (Active)   Site Assessment Clean, dry, & intact 7/25/2017  6:39 PM   Phlebitis Assessment 0 7/25/2017  6:39 PM   Infiltration Assessment 0 7/25/2017  6:39 PM   Dressing Status Clean, dry, & intact 7/25/2017  6:39 PM   Dressing Type Transparent;Tape 7/25/2017  6:39 PM   Hub Color/Line Status Pink; Infusing 7/25/2017  6:39 PM   Action Taken Other (comment) 7/25/2017  6:35 PM                        Intake & Output   Date 07/24/17 1900 - 07/25/17 0659 07/25/17 0700 - 07/26/17 0659   Shift 2072-6766 24 Hour Total 5386-5496 4267-2141 24 Hour Total   I  N  T  A  K  E   P. O. 500 500         P. O. 500 500       I.V.  (mL/kg/hr) 0 0         Volume (piperacillin-tazobactam (ZOSYN) 4.5 g in 0.9% sodium chloride (MBP/ADV) 100 mL) 0 0         Volume (piperacillin-tazobactam (ZOSYN) 4.5 g in 0.9% sodium chloride (MBP/ADV) 100 mL) 0 0         Volume (vancomycin (VANCOCIN) 2000 mg in  ml infusion) 0 0       Shift Total  (mL/kg) 500  (2.5) 500  (2.5)      O  U  T  P  U  T   Shift Total  (mL/kg)         500      Weight (kg) 201.8 201.8 201 201 201      Last Bowel Movement Last Bowel Movement Date: 07/25/17   Glucose Checks [] N/A  [] AC/HS  [] Q6  Concerns:   Nutrition Active Orders   Diet    DIET DIABETIC CONSISTENT CARB Regular       Consults []PT  []OT  []Speech  []Case Management   Cardiac Monitoring []N/A []Yes Expires:

## 2017-07-25 NOTE — ROUTINE PROCESS
Oncology Nursing Communication Tool      8:05 AM  7/25/2017     Bedside shift change report given to Debbie RN (incoming nurse) by Shirley Delgado RN (outgoing nurse) on Avi Sawyer a 41 y.o. female who was admitted on 7/24/2017  1:37 PM. Report included the following information SBAR, Kardex, Intake/Output, MAR and Recent Results. Significant changes during shift: pt requesting Flexeril for leg cramps at beginning of shift. Spoke with on call MD & orders received. Pt medicated with flexeril & ate spoonfulls of mustard. Wound care consulted for L leg wound. Incentive spirometer given & pt verbalized she's used one before, encouraged to use every hour. Pt reports cough productive at times with tan secretions. sats 88-93% on 6L NC. Issues for physician to address: none            Code Status: Full Code     Infections: ESBL     Allergies: Review of patient's allergies indicates no known allergies.      Current diet: DIET DIABETIC CONSISTENT CARB Regular       Pain Controlled [x] yes [] no   Bowel Movement [] yes [x] no   Last Bowel Movement (date)             Vital Signs:   Patient Vitals for the past 12 hrs:   Temp Pulse Resp BP SpO2   07/25/17 0511 98.2 °F (36.8 °C) (!) 115 24 109/55 92 %   07/25/17 0144 - - - - 92 %   07/24/17 2058 - - - - 100 %      Intake & Output:     Intake/Output Summary (Last 24 hours) at 07/25/17 0805  Last data filed at 07/25/17 0602   Gross per 24 hour   Intake              500 ml   Output                0 ml   Net              500 ml      Laboratory Results:     Recent Results (from the past 12 hour(s))   GLUCOSE, POC    Collection Time: 07/24/17 10:20 PM   Result Value Ref Range    Glucose (POC) 99 65 - 100 mg/dL    Performed by Karthikeyan Mattes    METABOLIC PANEL, BASIC    Collection Time: 07/25/17  5:30 AM   Result Value Ref Range    Sodium 136 136 - 145 mmol/L    Potassium 4.2 3.5 - 5.1 mmol/L    Chloride 100 97 - 108 mmol/L    CO2 34 (H) 21 - 32 mmol/L    Anion gap 2 (L) 5 - 15 mmol/L    Glucose 105 (H) 65 - 100 mg/dL    BUN 14 6 - 20 MG/DL    Creatinine 0.74 0.55 - 1.02 MG/DL    BUN/Creatinine ratio 19 12 - 20      GFR est AA >60 >60 ml/min/1.73m2    GFR est non-AA >60 >60 ml/min/1.73m2    Calcium 10.0 8.5 - 10.1 MG/DL   GLUCOSE, POC    Collection Time: 07/25/17  7:26 AM   Result Value Ref Range    Glucose (POC) 110 (H) 65 - 100 mg/dL    Performed by Cecelia Curiel for questions and clarifications were given to the incoming nurse. Patient's bed is in low position, side rails x2, door open PRN, call bell within reach and patient not in distress.       Cris Cleary RN

## 2017-07-25 NOTE — PROGRESS NOTES
Hospitalist Progress Note    NAME: Murtaza Vanessa   :  1970   MRN:  [id number]       Assessment / Plan:  COPD exacerbation POA- mild, still wheezing  Chronic hypoxic and hypercapneic respiratory failure POA- Oxygen at baseline level 5-6L/m  OHS / Morbid obesity POA- Body mass index is 71.82 kg/(m^2). her CPAP or BIPAP is broken - taken away by the insurance company as per pt now  CXR negative    Cont mucinex and scheduled duonebs. 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 CXR negative. Will increase lasix to BID dosing for now    DM  -continue glyburide with SSI prn     Tobacco abuse  -down to  ppd. Nicoderm patch        Code Status: full but doesn't want to be on prolong life support  Surrogate Decision Maker: Sanford Freire Juanpablo   Prophylaxis:change to Lovenox   Recommended Disposition:  PT, OT, RN     Subjective:     Chief Complaint / Reason for Physician Visit: F/U L Leg Chronic wound with maggots, COPD  \"I am feeling the same\". Discussed with RN events overnight. Review of Systems:  Symptom Y/N Comments  Symptom Y/N Comments   Fever/Chills n   Chest Pain n    Poor Appetite n   Edema n    Cough y   Abdominal Pain     Sputum n   Joint Pain     SOB/RAZO y   Pruritis/Rash     Nausea/vomit n   Tolerating PT/OT n Has declined today   Diarrhea    Tolerating Diet y    Constipation    Other       Could NOT obtain due to:      Objective:     VITALS:   Last 24hrs VS reviewed since prior progress note.  Most recent are:  Patient Vitals for the past 24 hrs:   Temp Pulse Resp BP SpO2   07/25/17 1019 - (!) 110 - 115/60 -   07/25/17 0935 - - - - 93 %   07/25/17 0511 98.2 °F (36.8 °C) (!) 115 24 109/55 92 %   07/25/17 0144 - - - - 92 %   07/24/17 2058 - - - - 100 %   07/24/17 1939 98.5 °F (36.9 °C) 87 22 108/83 96 %   07/24/17 1702 - 90 16 112/61 93 %   07/24/17 1600 - 93 19 - 99 %   07/24/17 1417 - - - - 99 %   07/24/17 1400 - (!) 109 18 112/42 96 %   07/24/17 1350 98.3 °F (36.8 °C) (!) 106 14 100/55 99 %       Intake/Output Summary (Last 24 hours) at 07/25/17 1346  Last data filed at 07/25/17 0602   Gross per 24 hour   Intake              500 ml   Output                0 ml   Net              500 ml        PHYSICAL EXAM:  General: WD, WN. Alert, cooperative, no acute distress, Morbidly obese +    EENT:  EOMI. Anicteric sclerae. MMM  Resp:  Expiratory Wheezing noted  No accessory muscle use  CV:  Regular  rhythm,  No edema  GI:  Soft, Non distended, Non tender.  +Bowel sounds  Neurologic:  Alert and oriented X 3, normal speech,   Psych:   Good insight. Not anxious nor agitated  Skin:  No rashes. No jaundice, Chronic Lichenified Lymph edematous Legs noted with fissures & chronic venous stasis changes    Reviewed most current lab test results and cultures  YES  Reviewed most current radiology test results   YES  Review and summation of old records today    NO  Reviewed patient's current orders and MAR    YES  PMH/SH reviewed - no change compared to H&P  ________________________________________________________________________  Care Plan discussed with:    Comments   Patient x    Family      RN x    Care Manager x    Consultant                        Multidiciplinary team rounds were held today with , nursing, pharmacist and clinical coordinator. Patient's plan of care was discussed; medications were reviewed and discharge planning was addressed. ________________________________________________________________________  Total NON critical care TIME:  36   Minutes    Total CRITICAL CARE TIME Spent:   Minutes non procedure based      Comments   >50% of visit spent in counseling and coordination of care     ________________________________________________________________________  Jason Kearney MD     Procedures: see electronic medical records for all procedures/Xrays and details which were not copied into this note but were reviewed prior to creation of Plan. LABS:  I reviewed today's most current labs and imaging studies.   Pertinent labs include:  Recent Labs      07/24/17   1423   WBC  11.7*   HGB  13.5   HCT  45.1   PLT  299     Recent Labs      07/25/17   0530  07/24/17   1423   NA  136  136   K  4.2  4.2   CL  100  98   CO2  34*  35*   GLU  105*  78   BUN  14  12   CREA  0.74  0.76   CA  10.0  10.8*   MG   --   1.8       Signed: Jason Kearney MD

## 2017-07-25 NOTE — PROGRESS NOTES
Occupational Therapy]  Orders received and medical record reviewed, including old OT notes in the chart. Upon arrival, pt was seated in chair and had just completed breathing tx. O2 sats in the mid 90s post treatment. Pt declined OT and PT this admission. She stated \"I didn't come in this place for this! \"  \"you Agnieszka Welch see how I move? \" and she angrily pushed the tray table away from her and stood up without assistance. Pt was adamant about not participating in therapy this admission. Per old OT notes, pt declined to participate in 8/2016. In 7/2015, pt lived with family , had Barthel Index score of 55/100 and needed assistance for adls, in 28/6630, pt lived with her sons and had an aide who assisted her with self care and IADLs. Pt was educated on adaptive aids that admission. Her admissions have been for respiratory issues, pain and wound care. Will discharge pt from OT services per her request and complete the OT orders. Informed nursing and . Thank you for the consult. 8 minutes.

## 2017-07-25 NOTE — PROGRESS NOTES
Rec'd pt on the unit on stretcher from ER. Completed admission database and assessment at this time. Pt denies and pain/n/v. VSS and pt oriented to unit and room. Pt cam up infusing vanc and will continue to infuse until completed. Call bell within reach. Primary Nurse Jean Gill RN and Lopez Armstrong RN performed a dual skin assessment on this patient Impairment noted- see wound doc flow sheet  Jeffrey score is 18    BLE callused with L lower leg wound to be assessed and poss wound care orders to be given 7/25/17. Dsg to L leg dry/intact/clean at this time.

## 2017-07-25 NOTE — PROGRESS NOTES
Referral received and acknowledged. Patient has declined PT and OT services. Will complete the order.

## 2017-07-26 LAB
ANION GAP BLD CALC-SCNC: 2 MMOL/L (ref 5–15)
ATRIAL RATE: 97 BPM
BUN SERPL-MCNC: 14 MG/DL (ref 6–20)
BUN/CREAT SERPL: 15 (ref 12–20)
CALCIUM SERPL-MCNC: 10.6 MG/DL (ref 8.5–10.1)
CALCULATED P AXIS, ECG09: 46 DEGREES
CALCULATED R AXIS, ECG10: 83 DEGREES
CALCULATED T AXIS, ECG11: 43 DEGREES
CHLORIDE SERPL-SCNC: 99 MMOL/L (ref 97–108)
CO2 SERPL-SCNC: 33 MMOL/L (ref 21–32)
CREAT SERPL-MCNC: 0.91 MG/DL (ref 0.55–1.02)
DIAGNOSIS, 93000: NORMAL
ERYTHROCYTE [DISTWIDTH] IN BLOOD BY AUTOMATED COUNT: 13.7 % (ref 11.5–14.5)
GLUCOSE BLD STRIP.AUTO-MCNC: 161 MG/DL (ref 65–100)
GLUCOSE BLD STRIP.AUTO-MCNC: 178 MG/DL (ref 65–100)
GLUCOSE BLD STRIP.AUTO-MCNC: 217 MG/DL (ref 65–100)
GLUCOSE BLD STRIP.AUTO-MCNC: 218 MG/DL (ref 65–100)
GLUCOSE SERPL-MCNC: 192 MG/DL (ref 65–100)
HCT VFR BLD AUTO: 43.4 % (ref 35–47)
HGB BLD-MCNC: 13.1 G/DL (ref 11.5–16)
MCH RBC QN AUTO: 28.6 PG (ref 26–34)
MCHC RBC AUTO-ENTMCNC: 30.2 G/DL (ref 30–36.5)
MCV RBC AUTO: 94.8 FL (ref 80–99)
P-R INTERVAL, ECG05: 170 MS
PLATELET # BLD AUTO: 270 K/UL (ref 150–400)
POTASSIUM SERPL-SCNC: 4.4 MMOL/L (ref 3.5–5.1)
Q-T INTERVAL, ECG07: 330 MS
QRS DURATION, ECG06: 84 MS
QTC CALCULATION (BEZET), ECG08: 419 MS
RBC # BLD AUTO: 4.58 M/UL (ref 3.8–5.2)
SERVICE CMNT-IMP: ABNORMAL
SODIUM SERPL-SCNC: 134 MMOL/L (ref 136–145)
VENTRICULAR RATE, ECG03: 97 BPM
WBC # BLD AUTO: 16 K/UL (ref 3.6–11)

## 2017-07-26 PROCEDURE — 85027 COMPLETE CBC AUTOMATED: CPT | Performed by: INTERNAL MEDICINE

## 2017-07-26 PROCEDURE — 82962 GLUCOSE BLOOD TEST: CPT

## 2017-07-26 PROCEDURE — 74011250637 HC RX REV CODE- 250/637: Performed by: INTERNAL MEDICINE

## 2017-07-26 PROCEDURE — 74011000250 HC RX REV CODE- 250: Performed by: INTERNAL MEDICINE

## 2017-07-26 PROCEDURE — 74011000258 HC RX REV CODE- 258: Performed by: INTERNAL MEDICINE

## 2017-07-26 PROCEDURE — 80048 BASIC METABOLIC PNL TOTAL CA: CPT | Performed by: EMERGENCY MEDICINE

## 2017-07-26 PROCEDURE — 36415 COLL VENOUS BLD VENIPUNCTURE: CPT | Performed by: INTERNAL MEDICINE

## 2017-07-26 PROCEDURE — 74011250637 HC RX REV CODE- 250/637: Performed by: HOSPITALIST

## 2017-07-26 PROCEDURE — 65270000029 HC RM PRIVATE

## 2017-07-26 PROCEDURE — 74011250636 HC RX REV CODE- 250/636: Performed by: INTERNAL MEDICINE

## 2017-07-26 PROCEDURE — 94640 AIRWAY INHALATION TREATMENT: CPT

## 2017-07-26 PROCEDURE — 74011636637 HC RX REV CODE- 636/637: Performed by: INTERNAL MEDICINE

## 2017-07-26 RX ORDER — IPRATROPIUM BROMIDE AND ALBUTEROL SULFATE 2.5; .5 MG/3ML; MG/3ML
3 SOLUTION RESPIRATORY (INHALATION)
Status: DISCONTINUED | OUTPATIENT
Start: 2017-07-26 | End: 2017-07-27 | Stop reason: HOSPADM

## 2017-07-26 RX ORDER — METRONIDAZOLE 500 MG/100ML
500 INJECTION, SOLUTION INTRAVENOUS EVERY 8 HOURS
Status: DISCONTINUED | OUTPATIENT
Start: 2017-07-26 | End: 2017-07-27 | Stop reason: HOSPADM

## 2017-07-26 RX ORDER — LEVOFLOXACIN 750 MG/1
750 TABLET ORAL EVERY 24 HOURS
Status: DISCONTINUED | OUTPATIENT
Start: 2017-07-26 | End: 2017-07-27 | Stop reason: HOSPADM

## 2017-07-26 RX ADMIN — PIPERACILLIN SODIUM,TAZOBACTAM SODIUM 4.5 G: 4; .5 INJECTION, POWDER, FOR SOLUTION INTRAVENOUS at 05:14

## 2017-07-26 RX ADMIN — Medication: at 10:05

## 2017-07-26 RX ADMIN — METRONIDAZOLE 500 MG: 500 INJECTION, SOLUTION INTRAVENOUS at 09:50

## 2017-07-26 RX ADMIN — INSULIN HUMAN 10 UNITS: 100 INJECTION, SUSPENSION SUBCUTANEOUS at 09:50

## 2017-07-26 RX ADMIN — ASPIRIN 81 MG: 81 TABLET, COATED ORAL at 08:14

## 2017-07-26 RX ADMIN — INSULIN LISPRO 3 UNITS: 100 INJECTION, SOLUTION INTRAVENOUS; SUBCUTANEOUS at 08:14

## 2017-07-26 RX ADMIN — Medication 10 ML: at 13:50

## 2017-07-26 RX ADMIN — METRONIDAZOLE 500 MG: 500 INJECTION, SOLUTION INTRAVENOUS at 17:08

## 2017-07-26 RX ADMIN — INSULIN HUMAN 10 UNITS: 100 INJECTION, SUSPENSION SUBCUTANEOUS at 17:09

## 2017-07-26 RX ADMIN — METOPROLOL TARTRATE 25 MG: 25 TABLET ORAL at 08:14

## 2017-07-26 RX ADMIN — ENOXAPARIN SODIUM 40 MG: 40 INJECTION SUBCUTANEOUS at 02:16

## 2017-07-26 RX ADMIN — SODIUM HYPOCHLORITE: 2.5 SOLUTION TOPICAL at 17:14

## 2017-07-26 RX ADMIN — LISINOPRIL 40 MG: 20 TABLET ORAL at 08:14

## 2017-07-26 RX ADMIN — LEVOFLOXACIN 750 MG: 750 TABLET, FILM COATED ORAL at 09:50

## 2017-07-26 RX ADMIN — CYCLOBENZAPRINE HYDROCHLORIDE 5 MG: 10 TABLET, FILM COATED ORAL at 14:22

## 2017-07-26 RX ADMIN — METHYLPREDNISOLONE SODIUM SUCCINATE 80 MG: 40 INJECTION, POWDER, FOR SOLUTION INTRAMUSCULAR; INTRAVENOUS at 05:13

## 2017-07-26 RX ADMIN — Medication 10 ML: at 21:29

## 2017-07-26 RX ADMIN — ENOXAPARIN SODIUM 40 MG: 40 INJECTION SUBCUTANEOUS at 13:50

## 2017-07-26 RX ADMIN — METHYLPREDNISOLONE SODIUM SUCCINATE 80 MG: 40 INJECTION, POWDER, FOR SOLUTION INTRAMUSCULAR; INTRAVENOUS at 21:28

## 2017-07-26 RX ADMIN — FUROSEMIDE 40 MG: 40 TABLET ORAL at 08:14

## 2017-07-26 RX ADMIN — Medication 10 ML: at 05:13

## 2017-07-26 RX ADMIN — IPRATROPIUM BROMIDE AND ALBUTEROL SULFATE 3 ML: .5; 3 SOLUTION RESPIRATORY (INHALATION) at 04:13

## 2017-07-26 RX ADMIN — INSULIN LISPRO 4 UNITS: 100 INJECTION, SOLUTION INTRAVENOUS; SUBCUTANEOUS at 12:13

## 2017-07-26 RX ADMIN — SODIUM HYPOCHLORITE: 2.5 SOLUTION TOPICAL at 10:05

## 2017-07-26 RX ADMIN — GUAIFENESIN 600 MG: 600 TABLET, EXTENDED RELEASE ORAL at 17:08

## 2017-07-26 RX ADMIN — GUAIFENESIN 600 MG: 600 TABLET, EXTENDED RELEASE ORAL at 08:14

## 2017-07-26 RX ADMIN — FUROSEMIDE 40 MG: 40 TABLET ORAL at 17:08

## 2017-07-26 RX ADMIN — Medication: at 17:14

## 2017-07-26 RX ADMIN — IPRATROPIUM BROMIDE AND ALBUTEROL SULFATE 3 ML: .5; 3 SOLUTION RESPIRATORY (INHALATION) at 14:15

## 2017-07-26 RX ADMIN — INSULIN LISPRO 4 UNITS: 100 INJECTION, SOLUTION INTRAVENOUS; SUBCUTANEOUS at 17:09

## 2017-07-26 RX ADMIN — IPRATROPIUM BROMIDE AND ALBUTEROL SULFATE 3 ML: .5; 3 SOLUTION RESPIRATORY (INHALATION) at 20:23

## 2017-07-26 RX ADMIN — GLYBURIDE 5 MG: 5 TABLET ORAL at 08:14

## 2017-07-26 RX ADMIN — METOPROLOL TARTRATE 25 MG: 25 TABLET ORAL at 17:08

## 2017-07-26 NOTE — PROGRESS NOTES
D/C plans discussed with pt who anticipates d/c to home tomorrow ~1230 by ambulance, after lunch. 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 [facility] is set for 1230 Thursday transport to home with Silas Deyaraceli 78 for wound care starting Friday.

## 2017-07-26 NOTE — PROGRESS NOTES
08:20- When pt asked how her night was, she stated that she felt like a pin cushion. Told pt I was sorry she felt that way and the labs were so that the doctor could treat her and she could get better. Notified pt that a lab was scheduled for 10 am due to the antibiotic, vancomycin, she was on so that the proper dosage could be given. Pt stating she is not being stuck any more today. Paged Dr. Shawna Soria to notify     08:35- Spoke to Dr. Shawna Flor MD to discontinue vancomycin today due to lab values.  Cancel lab draw for 10 am

## 2017-07-26 NOTE — PROGRESS NOTES
Hospitalist Progress Note    NAME: Alysha Santiago   :  1970   MRN:  [id number]       Assessment / Plan:  COPD exacerbation POA- mild, improved  Chronic hypoxic and hypercapneic respiratory failure POA- Oxygen at baseline level 5-6L/m  OHS / Morbid obesity POA- Body mass index is 71.82 kg/(m^2). her CPAP or BIPAP is broken - taken away by the insurance company as per pt now  CXR negative    Cont mucinex and scheduled duonebs. Taper Solumedrol IV Q 12 hrs today  Cont antibiotics for her leg wound- agree should cover possible bronchitis  -continue oxygen at home level  OP Sleep study center follow up to re-setup CPAP recommended to patient     Chronic LE lymphedema / venous stasis POA  Chronic left leg wound with cellulits and maggot infestation POA  DC zosyn and vanc today  Start IV Flagyl + PO Levaquin  Appreciate wound care- cont as OP on DC-  SN to be set up by CM     CAD, s/p stents  HTN  Chronic diastolic HF POA- Clinically compensated  -continue cardiac meds. CXR negative. Will increase lasix to BID dosing for now    DM uncontrolled - due to Steroids  -continue glyburide with SSI prn  Added NPH BID with Steroids for better control     Tobacco abuse  -down to  ppd. Nicoderm patch        Code Status: full but doesn't want to be on prolong life support  Surrogate Decision Maker: Sanford Geronimo   Prophylaxis:change to Lovenox   Recommended Disposition:  PT, OT, RN CM consulted for DC planning     Subjective:     Chief Complaint / Reason for Physician Visit: F/U L Leg Chronic wound with maggots, COPD  \"I am feeling the same\". Discussed with RN events overnight.      Review of Systems:  Symptom Y/N Comments  Symptom Y/N Comments   Fever/Chills n   Chest Pain n    Poor Appetite n   Edema n    Cough y   Abdominal Pain     Sputum n   Joint Pain     SOB/RAZO y improved  Pruritis/Rash     Nausea/vomit n   Tolerating PT/OT n    Diarrhea    Tolerating Diet y    Constipation    Other       Could NOT obtain due to:      Objective:     VITALS:   Last 24hrs VS reviewed since prior progress note. Most recent are:  Patient Vitals for the past 24 hrs:   Temp Pulse Resp BP SpO2   07/26/17 1416 - - - - 92 %   07/26/17 0723 97.5 °F (36.4 °C) 89 18 102/72 94 %   07/26/17 0413 - - - - 92 %   07/25/17 2241 97.9 °F (36.6 °C) 90 18 136/78 92 %   07/25/17 1752 98.3 °F (36.8 °C) 93 20 123/68 -   07/25/17 1701 - 86 - 115/70 -       Intake/Output Summary (Last 24 hours) at 07/26/17 1450  Last data filed at 07/26/17 1432   Gross per 24 hour   Intake             1060 ml   Output              350 ml   Net              710 ml        PHYSICAL EXAM:  General: WD, WN. Alert, cooperative, no acute distress, Morbidly obese +    EENT:  EOMI. Anicteric sclerae. MMM  Resp:  Expiratory Wheezing improved  No accessory muscle use  CV:  Regular  rhythm,  No edema  GI:  Soft, Non distended, Non tender.  +Bowel sounds  Neurologic:  Alert and oriented X 3, normal speech,   Psych:   Good insight. Not anxious nor agitated  Skin:  No rashes. No jaundice, Chronic Lichenified Lymph edematous Legs noted with fissures & chronic venous stasis changes    Reviewed most current lab test results and cultures  YES  Reviewed most current radiology test results   YES  Review and summation of old records today    NO  Reviewed patient's current orders and MAR    YES  PMH/SH reviewed - no change compared to H&P  ________________________________________________________________________  Care Plan discussed with:    Comments   Patient x    Family      RN x    Care Manager x Brando Bradshaw   Consultant  x Wound care [facility]                     Multidiciplinary team rounds were held today with , nursing, pharmacist and clinical coordinator. Patient's plan of care was discussed; medications were reviewed and discharge planning was addressed.      ________________________________________________________________________  Total NON critical care TIME:  26 Minutes    Total CRITICAL CARE TIME Spent:   Minutes non procedure based      Comments   >50% of visit spent in counseling and coordination of care     ________________________________________________________________________  Tea Liang MD     Procedures: see electronic medical records for all procedures/Xrays and details which were not copied into this note but were reviewed prior to creation of Plan. LABS:  I reviewed today's most current labs and imaging studies.   Pertinent labs include:  Recent Labs      07/26/17   0411  07/24/17   1423   WBC  16.0*  11.7*   HGB  13.1  13.5   HCT  43.4  45.1   PLT  270  299     Recent Labs      07/26/17   0411  07/25/17   0530  07/24/17   1423   NA  134*  136  136   K  4.4  4.2  4.2   CL  99  100  98   CO2  33*  34*  35*   GLU  192*  105*  78   BUN  14  14  12   CREA  0.91  0.74  0.76   CA  10.6*  10.0  10.8*   MG   --    --   1.8       Signed: Tea Liang MD

## 2017-07-26 NOTE — DIABETES MGMT
DTC Progress Note    Recommendations/ Comments: If appropriate, please consider increasing NPH dosing to 20 units every 12 hours timed with Solu-medrol dose. Please link the two orders in [facility] and taper NPH as steroid dose is tapered. Chart reviewed on Lawanda Flores for hyperglycemia. Patient is a 41 y.o. female with known history of Type 2 Diabetes on Glyburide 5mg daily  at home. A1c:   Lab Results   Component Value Date/Time    Hemoglobin A1c 5.3 07/25/2017 05:30 AM    Hemoglobin A1c 7.1 10/28/2016 04:25 AM       Recent Glucose Results: Lab Results   Component Value Date/Time     (H) 07/26/2017 04:11 AM    GLUCPOC 217 (H) 07/26/2017 11:11 AM    GLUCPOC 178 (H) 07/26/2017 07:21 AM    GLUCPOC 253 (H) 07/25/2017 09:18 PM        Lab Results   Component Value Date/Time    Creatinine 0.91 07/26/2017 04:11 AM     Estimated Creatinine Clearance: 143 mL/min (based on Cr of 0.91). Active Orders   Diet    DIET DIABETIC CONSISTENT CARB Regular        PO intake: Patient Vitals for the past 72 hrs:   % Diet Eaten   07/26/17 1432 100 %   07/26/17 0950 100 %       Current hospital DM medication:   -Humalog insulin resistant correction  -NPH 10 units two times daily   -Solu-medrol 80mg every 8 hours     Will continue to follow as needed.     Thank you    Shemar Hernandez, [facility]  Office: [phone]

## 2017-07-26 NOTE — PROGRESS NOTES
Bedside and Verbal shift change report given to [address] [place] [address] (oncoming nurse) by Maribell Carbajal RN (offgoing nurse). Report included the following information Kardex, MAR and Recent Results. Zone Phone for oncoming shift:       Shift Summary: Resting quietly at this time    LDAs               Peripheral IV 07/25/17 Left Forearm (Active)   Site Assessment Clean, dry, & intact 7/25/2017  8:13 PM   Phlebitis Assessment 0 7/25/2017  8:13 PM   Infiltration Assessment 0 7/25/2017  8:13 PM   Dressing Status Clean, dry, & intact 7/25/2017  8:13 PM   Dressing Type Transparent 7/25/2017  8:13 PM   Hub Color/Line Status Pink;Capped 7/25/2017  8:13 PM   Action Taken Other (comment) 7/25/2017  6:35 PM                        Intake & Output   Date 07/24/17 1900 - 07/25/17 0659 07/25/17 0700 - 07/26/17 0659   Shift 6132-7470 24 Hour Total 1690-7416 9839-2642 24 Hour Total   I  N  T  A  K  E   P. O. 500 500         P. O. 500 500       I.V.  (mL/kg/hr) 0 0         Volume (piperacillin-tazobactam (ZOSYN) 4.5 g in 0.9% sodium chloride (MBP/ADV) 100 mL) 0 0         Volume (piperacillin-tazobactam (ZOSYN) 4.5 g in 0.9% sodium chloride (MBP/ADV) 100 mL) 0 0         Volume (vancomycin (VANCOCIN) 2000 mg in  ml infusion) 0 0       Shift Total  (mL/kg) 500  (2.5) 500  (2.5)      O  U  T  P  U  T   Shift Total  (mL/kg)         500      Weight (kg) 201.8 201.8 201 201 201      Last Bowel Movement Last Bowel Movement Date: 07/25/17   Glucose Checks [] N/A  [x] AC/HS  [] Q6  Concerns:   Nutrition Active Orders   Diet    DIET DIABETIC CONSISTENT CARB Regular       Consults []PT  []OT  []Speech  []Case Management   Cardiac Monitoring [x]N/A []Yes Expires:

## 2017-07-27 VITALS
DIASTOLIC BLOOD PRESSURE: 81 MMHG | RESPIRATION RATE: 18 BRPM | BODY MASS INDEX: 47.09 KG/M2 | HEART RATE: 82 BPM | SYSTOLIC BLOOD PRESSURE: 126 MMHG | OXYGEN SATURATION: 96 % | HEIGHT: 66 IN | WEIGHT: 293 LBS | TEMPERATURE: 97.7 F

## 2017-07-27 LAB
ANION GAP BLD CALC-SCNC: 5 MMOL/L (ref 5–15)
BUN SERPL-MCNC: 19 MG/DL (ref 6–20)
BUN/CREAT SERPL: 24 (ref 12–20)
CALCIUM SERPL-MCNC: 10 MG/DL (ref 8.5–10.1)
CHLORIDE SERPL-SCNC: 101 MMOL/L (ref 97–108)
CO2 SERPL-SCNC: 29 MMOL/L (ref 21–32)
CREAT SERPL-MCNC: 0.79 MG/DL (ref 0.55–1.02)
ERYTHROCYTE [DISTWIDTH] IN BLOOD BY AUTOMATED COUNT: 13.7 % (ref 11.5–14.5)
GLUCOSE BLD STRIP.AUTO-MCNC: 185 MG/DL (ref 65–100)
GLUCOSE BLD STRIP.AUTO-MCNC: 195 MG/DL (ref 65–100)
GLUCOSE SERPL-MCNC: 218 MG/DL (ref 65–100)
HCT VFR BLD AUTO: 38.6 % (ref 35–47)
HGB BLD-MCNC: 12.1 G/DL (ref 11.5–16)
MCH RBC QN AUTO: 29.5 PG (ref 26–34)
MCHC RBC AUTO-ENTMCNC: 31.3 G/DL (ref 30–36.5)
MCV RBC AUTO: 94.1 FL (ref 80–99)
PLATELET # BLD AUTO: 266 K/UL (ref 150–400)
POTASSIUM SERPL-SCNC: 4.7 MMOL/L (ref 3.5–5.1)
RBC # BLD AUTO: 4.1 M/UL (ref 3.8–5.2)
SERVICE CMNT-IMP: ABNORMAL
SERVICE CMNT-IMP: ABNORMAL
SODIUM SERPL-SCNC: 135 MMOL/L (ref 136–145)
WBC # BLD AUTO: 19.8 K/UL (ref 3.6–11)

## 2017-07-27 PROCEDURE — 94640 AIRWAY INHALATION TREATMENT: CPT

## 2017-07-27 PROCEDURE — 85027 COMPLETE CBC AUTOMATED: CPT | Performed by: INTERNAL MEDICINE

## 2017-07-27 PROCEDURE — 74011250637 HC RX REV CODE- 250/637: Performed by: INTERNAL MEDICINE

## 2017-07-27 PROCEDURE — 74011250636 HC RX REV CODE- 250/636: Performed by: INTERNAL MEDICINE

## 2017-07-27 PROCEDURE — 36415 COLL VENOUS BLD VENIPUNCTURE: CPT | Performed by: INTERNAL MEDICINE

## 2017-07-27 PROCEDURE — 74011636637 HC RX REV CODE- 636/637: Performed by: INTERNAL MEDICINE

## 2017-07-27 PROCEDURE — 74011000250 HC RX REV CODE- 250: Performed by: INTERNAL MEDICINE

## 2017-07-27 PROCEDURE — 77010033678 HC OXYGEN DAILY

## 2017-07-27 PROCEDURE — 80048 BASIC METABOLIC PNL TOTAL CA: CPT | Performed by: INTERNAL MEDICINE

## 2017-07-27 PROCEDURE — 82962 GLUCOSE BLOOD TEST: CPT

## 2017-07-27 RX ORDER — PREDNISONE 10 MG/1
TABLET ORAL
Qty: 21 TAB | Refills: 0 | Status: SHIPPED | OUTPATIENT
Start: 2017-07-27 | End: 2017-09-22

## 2017-07-27 RX ORDER — GUAIFENESIN 600 MG/1
600 TABLET, EXTENDED RELEASE ORAL 2 TIMES DAILY
Qty: 20 TAB | Refills: 0 | Status: SHIPPED | OUTPATIENT
Start: 2017-07-27 | End: 2017-08-06

## 2017-07-27 RX ORDER — METRONIDAZOLE 500 MG/1
500 TABLET ORAL 3 TIMES DAILY
Qty: 21 TAB | Refills: 0 | Status: SHIPPED | OUTPATIENT
Start: 2017-07-27 | End: 2017-08-03

## 2017-07-27 RX ORDER — HYDROCODONE BITARTRATE AND ACETAMINOPHEN 5; 325 MG/1; MG/1
1 TABLET ORAL
Qty: 20 TAB | Refills: 0 | Status: SHIPPED | OUTPATIENT
Start: 2017-07-27 | End: 2017-09-27

## 2017-07-27 RX ORDER — LEVOFLOXACIN 750 MG/1
750 TABLET ORAL EVERY 24 HOURS
Qty: 7 TAB | Refills: 0 | Status: SHIPPED | OUTPATIENT
Start: 2017-07-27 | End: 2017-08-03

## 2017-07-27 RX ADMIN — METOPROLOL TARTRATE 25 MG: 25 TABLET ORAL at 08:23

## 2017-07-27 RX ADMIN — LISINOPRIL 40 MG: 20 TABLET ORAL at 08:24

## 2017-07-27 RX ADMIN — Medication 10 ML: at 02:32

## 2017-07-27 RX ADMIN — ASPIRIN 81 MG: 81 TABLET, COATED ORAL at 08:24

## 2017-07-27 RX ADMIN — METHYLPREDNISOLONE SODIUM SUCCINATE 80 MG: 40 INJECTION, POWDER, FOR SOLUTION INTRAMUSCULAR; INTRAVENOUS at 08:24

## 2017-07-27 RX ADMIN — LEVOFLOXACIN 750 MG: 750 TABLET, FILM COATED ORAL at 08:24

## 2017-07-27 RX ADMIN — NYSTATIN: 100000 POWDER TOPICAL at 05:26

## 2017-07-27 RX ADMIN — INSULIN HUMAN 10 UNITS: 100 INJECTION, SUSPENSION SUBCUTANEOUS at 08:25

## 2017-07-27 RX ADMIN — GUAIFENESIN 600 MG: 600 TABLET, EXTENDED RELEASE ORAL at 08:24

## 2017-07-27 RX ADMIN — INSULIN LISPRO 3 UNITS: 100 INJECTION, SOLUTION INTRAVENOUS; SUBCUTANEOUS at 08:25

## 2017-07-27 RX ADMIN — ENOXAPARIN SODIUM 40 MG: 40 INJECTION SUBCUTANEOUS at 02:30

## 2017-07-27 RX ADMIN — FUROSEMIDE 40 MG: 40 TABLET ORAL at 08:23

## 2017-07-27 RX ADMIN — Medication 10 ML: at 05:26

## 2017-07-27 RX ADMIN — METRONIDAZOLE 500 MG: 500 INJECTION, SOLUTION INTRAVENOUS at 01:06

## 2017-07-27 RX ADMIN — IPRATROPIUM BROMIDE AND ALBUTEROL SULFATE 3 ML: .5; 3 SOLUTION RESPIRATORY (INHALATION) at 05:39

## 2017-07-27 RX ADMIN — METRONIDAZOLE 500 MG: 500 INJECTION, SOLUTION INTRAVENOUS at 08:26

## 2017-07-27 RX ADMIN — GLYBURIDE 5 MG: 5 TABLET ORAL at 08:24

## 2017-07-27 RX ADMIN — Medication: at 08:26

## 2017-07-27 NOTE — DISCHARGE SUMMARY
Hospitalist Discharge Summary     Patient ID:  Aye Mccabe  [id number]  41 y.o.  1970    PCP on record: Landon Raza MD    Admit date: 7/24/2017  Discharge date and time: 7/27/2017      DISCHARGE DIAGNOSIS:    Acute COPD exacerbation POA-resolving, cont Po steroid taper  Chronic hypoxic and hypercapneic respiratory failure POA- Oxygen at baseline level 5-6L/m  OHS / Morbid obesity POA- Body mass index is 71.82 kg/(m^2). , OP sleep study & CPAP set up with Dr Opal Norman sleep clinic  Chronic LE lymphedema / venous stasis POA  Chronic left leg wound with cellulits and maggot infestation POA - cont Wound care as arranged at home, cont Levaquin 7 Flagyl x 7 days with probiotics  CAD, s/p stents  HTN  Chronic diastolic HF POA- Clinically compensated  DM uncontrolled - due to Steroids, now tapering to PO steroids      CONSULTATIONS:  None    Excerpted HPI from H&P of Lefty Echevarria MD:  Debi.Albe y.o. morbidly obese female with a history of chronic hypoxic and hypercapneic resp failure (5-6L), COPD, OHS, CAD, CHF, DM and chronic LE lymphedema and chronic left leg wound who presents with SOB x few days. She endorses chest congestion, cough, clear sputum and wheezing. She also complains of worsening left leg pain and increase drainage. Home health has been coming in and some maggots were removed about 2 weeks ago. She intermittently sees maggots coming out of her wound and she blames her increase pain on them. EMS was dispatched and ER eval again noted maggots in her wound. Xray leg shows Massive enlargement of the left lower extremity with edema but no gas. We were asked to admit for work up and evaluation of the above problems. \"    ______________________________________________________________________  DISCHARGE SUMMARY/HOSPITAL COURSE:  for full details see H&P, daily progress notes, labs, consult notes.      COPD exacerbation POA- mild, improved  Chronic hypoxic and hypercapneic respiratory failure POA- Oxygen at baseline level 5-6L/m  OHS / Morbid obesity POA- Body mass index is 71.82 kg/(m^2). her CPAP or BIPAP is broken - taken away by the insurance company as per pt now  CXR negative     Cont mucinex and scheduled duonebs. Taper Solumedrol IV Q 12 hrs today  Cont antibiotics for her leg wound- agree should cover possible bronchitis  -continue oxygen at home level  OP Sleep study center follow up to re-setup CPAP recommended to patient      Chronic LE lymphedema / venous stasis POA  Chronic left leg wound with cellulits and maggot infestation POA  DC zosyn and vanc today  Start IV Flagyl + PO Levaquin  Appreciate wound care- cont as OP on DC- HH SN to be set up by CM      CAD, s/p stents  HTN  Chronic diastolic HF POA- Clinically compensated  -continue cardiac meds.  CXR negative.    Will increase lasix to BID dosing for now     DM uncontrolled - due to Steroids  -continue glyburide with SSI prn  Added NPH BID with Steroids for better control      Tobacco abuse  -down to 1/4 ppd.  Nicoderm patch          Code Status: full but doesn't want to be on prolong life support  Surrogate Decision Maker: Sanford Geronimo         _______________________________________________________________________  Patient seen and examined by me on discharge day. Pertinent Findings:  Gen:    Not in distress, morbidly obese +  Chest: Clear lungs  CVS:   Regular rhythm. No edema  Abd:  Soft, not distended, not tender  Neuro:  Alert, oriented x 3  Extremities: L leg wound wrapped with bandage- clean & dry +  _______________________________________________________________________  DISCHARGE MEDICATIONS:   Current Discharge Medication List      START taking these medications    Details   guaiFENesin ER (MUCINEX) 600 mg ER tablet Take 1 Tab by mouth two (2) times a day for 10 days. Qty: 20 Tab, Refills: 0      levoFLOXacin (LEVAQUIN) 750 mg tablet Take 1 Tab by mouth every twenty-four (24) hours for 7 days.   Qty: 7 Tab, Refills: 0      l.acidoph-B.lactis-B.longum (FLORAJEN3) 460 mg (7.5-6- 1.5 bill. cell) cap cap Take 1 Cap by mouth Daily (before breakfast). Qty: 7 Cap, Refills: 0      metroNIDAZOLE (FLAGYL) 500 mg tablet Take 1 Tab by mouth three (3) times daily for 7 days. Qty: 21 Tab, Refills: 0         CONTINUE these medications which have CHANGED    Details   HYDROcodone-acetaminophen (NORCO) 5-325 mg per tablet Take 1 Tab by mouth every four (4) hours as needed for Pain. Max Daily Amount: 6 Tabs. Qty: 20 Tab, Refills: 0      predniSONE (STERAPRED DS) 10 mg dose pack As directed  Qty: 21 Tab, Refills: 0         CONTINUE these medications which have NOT CHANGED    Details   albuterol (PROVENTIL VENTOLIN) 2.5 mg /3 mL (0.083 %) nebulizer solution 3 mL by Nebulization route every four (4) hours as needed for Wheezing. Qty: 24 Each, Refills: 0      furosemide (LASIX) 40 mg tablet Take 40 mg by mouth daily. clotrimazole (LOTRIMIN) 1 % topical cream Apply to the affected areas twice daily until healed  Qty: 15 g, Refills: 0      lisinopril (PRINIVIL, ZESTRIL) 40 mg tablet Take 40 mg by mouth daily. glyBURIDE (DIABETA) 5 mg tablet Take 5 mg by mouth Daily (before breakfast). cetirizine (ZYRTEC) 10 mg tablet Take 10 mg by mouth daily as needed for Allergies. omeprazole (PRILOSEC) 40 mg capsule Take 40 mg by mouth daily. ammonium lactate (LAC-HYDRIN) 12 % topical cream rub in to affected area well  Qty: 280 g, Refills: 1      aspirin delayed-release 81 mg tablet Take 81 mg by mouth daily. metoprolol (LOPRESSOR) 25 mg tablet Take 25 mg by mouth two (2) times a day. albuterol (PROVENTIL, VENTOLIN) 90 mcg/actuation inhaler Take 1-2 Puffs by inhalation every four (4) hours as needed for Wheezing. Qty: 17 g, Refills: 0      lovastatin (MEVACOR) 40 mg tablet Take 1 Tab by mouth nightly.   Qty: 30 Tab, Refills: 6    Associated Diagnoses: Atherosclerosis of native coronary artery without angina pectoris nystatin (MYCOSTATIN) powder Apply  to affected area three (3) times daily. APPLY TO Bilater breasts, pannus, groin after her bath and then two other times during the day. Qty: 1 Bottle, Refills: 0      medroxyPROGESTERone (DEPO-PROVERA) 150 mg/mL injection 150 mg.      ketoconazole (NIZORAL) 2 % topical cream Apply  to affected area daily. nitroglycerin (NITROSTAT) 0.4 mg SL tablet 1 Tab by SubLINGual route every five (5) minutes as needed for Chest Pain (call 911 if not relieved by 3). Qty: 25 Tab, Refills: 2    Associated Diagnoses: SVT (supraventricular tachycardia) (HCC); CHF (congestive heart failure) (HCC)         STOP taking these medications       cyclobenzaprine (FLEXERIL) 10 mg tablet Comments:   Reason for Stopping:               My Recommended Diet, Activity, Wound Care, and follow-up labs are listed in the patient's Discharge Insturctions which I have personally completed and reviewed.     _______________________________________________________________________  DISPOSITION:    Home with Family:    Home with HH/PT/OT/RN: x   SNF/LTC:    LUIS A:    OTHER:        Condition at Discharge:  Stable  _______________________________________________________________________  Follow up with:   PCP : Landon Raza MD  Follow-up Information     Follow up With Details [facility] office will call patient to schedule a hospital follow-up [address] [place] [address]  [phone]    Landon Raza MD   Patient can only remember the practice name and not the physician                Total time in minutes spent coordinating this discharge (includes going over instructions, follow-up, prescriptions, and preparing report for sign off to her PCP) :  36 minutes    Signed:  Figueroa Ferreira MD

## 2017-07-27 NOTE — PROGRESS NOTES
Bedside shift change report given to Kendy (oncoming nurse) by [address]  (offgoing nurse). Report included the following information SBAR, Kardex, Intake/Output, MAR, Recent Results and Cardiac Rhythm . [employer] Zone Phone for oncoming shift:   [phone]    Shift Summary: Wound care completed twice today     LDAs               Peripheral IV 07/25/17 Left Forearm (Active)   Site Assessment Clean, dry, & intact 7/26/2017  3:06 PM   Phlebitis Assessment 0 7/26/2017  3:06 PM   Infiltration Assessment 0 7/26/2017  3:06 PM   Dressing Status Clean, dry, & intact 7/26/2017  3:06 PM   Dressing Type Transparent;Tape 7/26/2017  3:06 PM   Hub Color/Line Status Pink;Capped 7/26/2017  3:06 PM   Action Taken Other (comment) 7/25/2017  6:35 PM   Alcohol Cap Used Yes 7/26/2017  3:06 PM       Peripheral IV 07/25/17 Left;Upper Arm (Active)   Site Assessment Clean, dry, & intact 7/26/2017  3:06 PM   Phlebitis Assessment 0 7/26/2017  3:06 PM   Infiltration Assessment 0 7/26/2017  3:06 PM   Dressing Status Clean, dry, & intact 7/26/2017  3:06 PM   Dressing Type Transparent;Tape 7/26/2017  3:06 PM   Hub Color/Line Status Yellow;Capped 7/26/2017  3:06 PM   Alcohol Cap Used Yes 7/26/2017  3:06 PM                        Intake & Output   Date 07/25/17 1900 - 07/26/17 0659 07/26/17 0700 - 07/27/17 0659   Shift 1307-2187 24 Hour Total 2609-7067 0308-2666 24 Hour Total   I  N  T  A  K  E   P.O.   1440  1440      P. O.   1440  1440    I.V.  (mL/kg/hr)   200  (0.1)  200      Volume (metroNIDAZOLE (FLAGYL) IVPB premix 500 mg)   200  200    Shift Total  (mL/kg)   1640  (8.2)  1640  (9.2)   O  U  T  P  U  T   Urine  (mL/kg/hr) 350 350         Urine Voided 350 350         Urine Occurrence(s)   2 x  2 x    Stool           Stool Occurrence(s)   1 x  1 x    Shift Total  (mL/kg) 350  (1.7) 350  (1.7)      NET -350 -350 1640  1640   Weight (kg) 201 201 201 178. 1 178.1      Last Bowel Movement Last Bowel Movement Date: 07/26/17   Glucose Checks [] N/A  [] AC/HS  [] Q6  Concerns:   Nutrition Active Orders   Diet    DIET DIABETIC CONSISTENT CARB Regular       Consults []PT  []OT  []Speech  []Case Management   Cardiac Monitoring []N/A []Yes Expires:

## 2017-07-27 NOTE — PROGRESS NOTES
Patients PCP, Dr. Lucita Chaudhry,  is with [facility]. I notified them that the patient will be discharged today. The office will call the patient at home with a f/u appointment.   A f/u with Dr. Susana Tate has been scheduled for July 28 at 11:20am.  Appointment with Dr. Susana Tate has been cancelled per patient request.  Patient would like to make her own appointment

## 2017-07-27 NOTE — PROGRESS NOTES
D/C plans discussed with pt who anticipates d/c to home today ~1230 by ambulance, after lunch. 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     [facility] is set for 1230 Thursday transport to home with Silas Vickvidhyaromel 78 for wound care starting Friday.                   Staff please send facesheet, ambulance form, and paperwork with pt. I called for an early lunch tray.   Thanks

## 2017-07-27 NOTE — PROGRESS NOTES
Home Health Care Discharge Planning: [facility]  Face to Face Encounter      NAME: Luh Velazquez   :  1970   MRN:  [id number]     Primary Diagnosis: L Leg wound with Maggot infestation, Chronic bilateral leg lymphedema, DM, morbidly obese    Date of Face to Face:  2017 11:29 AM                                  Face to Face Encounter findings are related to primary reason for home care:   YES    1. I certify that the patient needs intermittent skilled nursing care, physical therapy and/or speech therapy. I will not be following this patient in the Community and Dr. Nya Raza MD will be responsible for signing the [address] of Care. 2. Initial Orders for Care: Skilled Nursing for wound care    3. I certify that this patient is homebound because of illness or injury, need the aid of supportive devices such as crutches, canes, wheelchairs, and walkers; the use of special transportation; or the assistance of another person in order to leave their place of residence. There exists a normal inability to leave home and leaving home requires a considerable and taxing effort. 4. I certify that this patient is under my care and that I had a Face-to-Face Encounter that meets the physician Face-to-Face Encounter requirements. Document the physical findings from the Face-to-Face Encounter that support the need for skilled services:  Has wound that requires skilled nursing assessment and treatment     Deangelo Rodriguez MD  Discharging Physician  Office: [phone]  Fax:   [phone]

## 2017-07-27 NOTE — DISCHARGE INSTRUCTIONS
HOSPITALIST DISCHARGE INSTRUCTIONS    NAME: Emily Bloom   :  1970   MRN:  [id number]     Date/Time:  2017 11:30 AM    ADMIT DATE: 2017     DISCHARGE DATE: 2017     DISCHARGE DIAGNOSIS:  Acute COPD exacerbation POA-resolving, cont Po steroid taper  Chronic hypoxic and hypercapneic respiratory failure POA- Oxygen at baseline level 5-6L/m  OHS / Morbid obesity POA- Body mass index is 71.82 kg/(m^2). , OP sleep study & CPAP set up with Dr [facility]duffie [facility]  Chronic LE lymphedema / venous stasis POA  Chronic left leg wound with cellulits and maggot infestation POA - cont Wound care as arranged at home, cont Levaquin 7 Flagyl x 7 days with probiotics  CAD, s/p stents  HTN  Chronic diastolic HF POA- Clinically compensated  DM uncontrolled - due to Steroids, now tapering to PO steroids    Active Problems:    CHF (congestive heart failure) ([facility] [address] (8/3/2012)      COPD (chronic obstructive pulmonary disease) ([facility] [address] (2015)      Obesity, morbid (more than 100 lbs over ideal weight or BMI > 40) ([address] (2013)      Type II diabetes mellitus, uncontrolled ([address] (2016)      Lymphedema of both lower extremities (1/10/2016)      COPD exacerbation ([facility] [facility] (10/28/2016)      Maggot infestation (2017)         MEDICATIONS:  As per medication reconciliation  list  · It is important that you take the medication exactly as they are prescribed. · Keep your medication in the bottles provided by the pharmacist and keep a list of the medication names, dosages, and times to be taken in your wallet. · Do not take other medications without consulting your doctor.      Pain Management: per above medications    What to do at Home    Recommended diet:  Cardiac Diet, Diabetic Diet and Low fat, Low cholesterol    Recommended activity: Activity as tolerated    If you have questions regarding the hospital related prescriptions or hospital related issues please call Sourav Neumann at [phone].    If you experience any of the following symptoms then please call your primary care physician or return to the emergency room if you cannot get hold of your doctor:  Fever, chills, nausea, vomiting, diarrhea, change in mentation, falling, bleeding, shortness of breath,     Follow Up:  Dr Romeo Cannon( [facility])   Dr Donny Sheikh for Sleep study & CPAP re-setup as discussed. Information obtained by :  I understand that if any problems occur once I am at home I am to contact my physician. I understand and acknowledge receipt of the instructions indicated above.                                                                                                                                            Physician's or R.N.'s Signature                                                                  Date/Time                                                                                                                                              Patient or Representative Signature                                                          Date/Time

## 2017-07-28 ENCOUNTER — PATIENT OUTREACH (OUTPATIENT)
Dept: CASE MANAGEMENT | Age: 47
End: 2017-07-28

## 2017-07-28 NOTE — PROGRESS NOTES
Patient Name: Murtaza Vanessa  YOB: 1970   Date/Time:  7/28/2017 3:16 PM    Dr Bianca Benson, [facility] [phone]  [address] Patient for Oxygen [phone]  [facility] [address] [facility] [phone]    Courtesy outreach call post hospitalization. Patient hospitalized from 7/24/17-7/26/17 for  COPD Exacerbation at [facility].      Nurse Navigator(NN) contacted the patient by telephone. Patient states she is doing fine but has mild pain in leg and back. Has not been able to fill any of her medications including her antibiotics and prednisone taper. She states she has scripts for everything but does not have transportation to go fill them. NN called [facility] who states they do not have delivery service. Patient did not want to utilize [facility] who may or may not have delivery service. [facility] consulted for possible further assistance. Update: Discussed calling [facility] to see if they will deliver; if so then call ED to have MD to fax orders electronically. [facility] delivers for a $2.00 fee. Two calls placed to ED [facility] ED. Unable to be connected to MD/NN/SW in ED. Call placed to patient who states she is willing to pay the $2.00 delivery charge. Explained that they would not be able to provide her pain medication via the delivery service. Patient requesting a callback within the hour to see if she can get a friend over the weekend and if that does not work then she will utilize the delivery service on Monday. Called patient back and states she thinks she will be able to get someone tomorrow. Agreeable to a return call on Monday; if she was unable to fill will utilize [id number]. [facility] health has come out today to provide wound care to her left leg ulcer. States her dressing is clean, dry and intact. Scheduled to come in the home twice weekly on Tuesday and Fridays.  They are aware that patient does not have medications and notified Dr Kirit Ray her visiting physician. He is scheduled to come to the home on Friday 8/4/17. Patient cancelled sleep study with [facility] MD. [address] comes 7 days a week for 6 hours a day. Readmission Risk  RRAT score: 22 HIGH RISK    Total Hospitalizations/ED visits:  3 ED visits in the past 6 months prior to most recent hospitalization    Medical History:     Past Medical History:   Diagnosis Date    Arthritis     Asthma     CAD (coronary artery disease)     [facility]     COPD (chronic obstructive pulmonary disease) ([facility]     Diabetes ([facility]     Hypertension     Morbid [facility] with BMI of 70 and over[facility]     NSTEMI (non-ST elevated myocardial infarction) [facility]     SVT (supraventricular tachycardia) ([facility])      Diet:   Cardiac, diabetic, low chol and fat   Activity:    As tolerated    Has not filled medications due to transportation issues. Discussed the importance of antibiotics especially with her elevated WBC. States she is aware but can not find someone to fill scripts for. The personal aide that will  groceries is every other week   Current Outpatient Prescriptions   Medication Sig    HYDROcodone-acetaminophen (NORCO) 5-325 mg per tablet Take 1 Tab by mouth every four (4) hours as needed for Pain. Max Daily Amount: 6 Tabs.  predniSONE (STERAPRED DS) 10 mg dose pack As directed    guaiFENesin ER (MUCINEX) 600 mg ER tablet Take 1 Tab by mouth two (2) times a day for 10 days.  levoFLOXacin (LEVAQUIN) 750 mg tablet Take 1 Tab by mouth every twenty-four (24) hours for 7 days.  l.acidoph-B.lactis-B.longum (FLORAJEN3) 460 mg (7.5-6- 1.5 bill. cell) cap cap Take 1 Cap by mouth Daily (before breakfast).  metroNIDAZOLE (FLAGYL) 500 mg tablet Take 1 Tab by mouth three (3) times daily for 7 days.     albuterol (PROVENTIL VENTOLIN) 2.5 mg /3 mL (0.083 %) nebulizer solution 3 mL by Nebulization route every four (4) hours as needed for Wheezing.  furosemide (LASIX) 40 mg tablet Take 40 mg by mouth daily.  lovastatin (MEVACOR) 40 mg tablet Take 1 Tab by mouth nightly.  clotrimazole (LOTRIMIN) 1 % topical cream Apply to the affected areas twice daily until healed    lisinopril (PRINIVIL, ZESTRIL) 40 mg tablet Take 40 mg by mouth daily.  glyBURIDE (DIABETA) 5 mg tablet Take 5 mg by mouth Daily (before breakfast).  cetirizine (ZYRTEC) 10 mg tablet Take 10 mg by mouth daily as needed for Allergies.  nystatin (MYCOSTATIN) powder Apply  to affected area three (3) times daily. APPLY TO Bilater breasts, pannus, groin after her bath and then two other times during the day.  medroxyPROGESTERone (DEPO-PROVERA) 150 mg/mL injection 150 mg.    omeprazole (PRILOSEC) 40 mg capsule Take 40 mg by mouth daily.  ketoconazole (NIZORAL) 2 % topical cream Apply  to affected area daily.  ammonium lactate (LAC-HYDRIN) 12 % topical cream rub in to affected area well    aspirin delayed-release 81 mg tablet Take 81 mg by mouth daily.  metoprolol (LOPRESSOR) 25 mg tablet Take 25 mg by mouth two (2) times a day.  nitroglycerin (NITROSTAT) 0.4 mg SL tablet 1 Tab by SubLINGual route every five (5) minutes as needed for Chest Pain (call 911 if not relieved by 3).  albuterol (PROVENTIL, VENTOLIN) 90 mcg/actuation inhaler Take 1-2 Puffs by inhalation every four (4) hours as needed for Wheezing. No current facility-administered medications for this visit. No Known Allergies    Discharge Instructions :  Reviewed discharge instructions with patient. Patient verbalizes understanding of discharge instructions and follow-up care. Home Health orders at discharge? yes If yes, what agency and what services? Wound care with [facility] Tues/Fri.      Red Flags:  Fever, chills, nausea, vomiting, diarrhea, change in mentation, falling, bleeding, shortness of breath,      Labs Reviewed:  Recent Labs      07/27/17   0501 07/26/17   0411   WBC  19.8*  16.0*   HGB  12.1  13.1   HCT  38.6  43.4   PLT  266  270     Recent Labs      07/27/17   0501  07/26/17 0411   NA  135*  134*   K  4.7  4.4   CL  101  99   CO2  29  33*   GLU  218*  192*   BUN  19  14   CREA  0.79  0.91   CA  10.0  10.6*     Advance Medical Directive on file in EMR? yes has an advanced directive - a copy has been provided    NN Plan:  Patient agreeable to follow up call.

## 2017-07-29 LAB
BACTERIA SPEC CULT: NORMAL
SERVICE CMNT-IMP: NORMAL

## 2017-07-31 ENCOUNTER — PATIENT OUTREACH (OUTPATIENT)
Dept: CASE MANAGEMENT | Age: 47
End: 2017-07-31

## 2017-07-31 ENCOUNTER — PATIENT OUTREACH (OUTPATIENT)
Dept: FAMILY MEDICINE CLINIC | Age: 47
End: 2017-07-31

## 2017-07-31 NOTE — PROGRESS NOTES
Patient Name: Louana Primrose  YOB: 1970    Follow up courtesy call to see if patient was able to  prescription medication over the weekend. States they were dropped off yesterday and she has someone picking up today. Appreciative of the follow up. Resolving Care coordination.

## 2017-08-07 ENCOUNTER — PATIENT OUTREACH (OUTPATIENT)
Dept: FAMILY MEDICINE CLINIC | Age: 47
End: 2017-08-07

## 2017-08-07 NOTE — PROGRESS NOTES
Patient Name: Lawanda Flores  YOB: 1970    Last Follow up courtesy call post hospitalization. Ms Miranda Meserets confirmed her PCP came out to her home on Friday. He is on a monthly schedule to visit her home. [facility] home [facility] twice weekly.

## 2017-09-08 ENCOUNTER — APPOINTMENT (OUTPATIENT)
Dept: GENERAL RADIOLOGY | Age: 47
End: 2017-09-08
Attending: EMERGENCY MEDICINE
Payer: MEDICARE

## 2017-09-08 ENCOUNTER — HOSPITAL ENCOUNTER (EMERGENCY)
Age: 47
Discharge: HOME | End: 2017-09-09
Attending: EMERGENCY MEDICINE
Payer: MEDICARE

## 2017-09-08 DIAGNOSIS — I50.23 ACUTE ON CHRONIC SYSTOLIC CONGESTIVE HEART FAILURE (HCC): Primary | ICD-10-CM

## 2017-09-08 LAB
ALBUMIN SERPL-MCNC: 3.2 G/DL (ref 3.5–5)
ALBUMIN/GLOB SERPL: 0.6 {RATIO} (ref 1.1–2.2)
ALP SERPL-CCNC: 114 U/L (ref 45–117)
ALT SERPL-CCNC: 21 U/L (ref 12–78)
ANION GAP SERPL CALC-SCNC: 3 MMOL/L (ref 5–15)
AST SERPL-CCNC: 16 U/L (ref 15–37)
BASOPHILS # BLD: 0 K/UL (ref 0–0.1)
BASOPHILS NFR BLD: 0 % (ref 0–1)
BILIRUB SERPL-MCNC: 0.4 MG/DL (ref 0.2–1)
BNP SERPL-MCNC: 259 PG/ML (ref 0–125)
BUN SERPL-MCNC: 10 MG/DL (ref 6–20)
BUN/CREAT SERPL: 16 (ref 12–20)
CALCIUM SERPL-MCNC: 10.1 MG/DL (ref 8.5–10.1)
CHLORIDE SERPL-SCNC: 99 MMOL/L (ref 97–108)
CK SERPL-CCNC: 24 U/L (ref 26–192)
CO2 SERPL-SCNC: 34 MMOL/L (ref 21–32)
CREAT SERPL-MCNC: 0.64 MG/DL (ref 0.55–1.02)
EOSINOPHIL # BLD: 0.2 K/UL (ref 0–0.4)
EOSINOPHIL NFR BLD: 2 % (ref 0–7)
ERYTHROCYTE [DISTWIDTH] IN BLOOD BY AUTOMATED COUNT: 14.6 % (ref 11.5–14.5)
GLOBULIN SER CALC-MCNC: 5.4 G/DL (ref 2–4)
GLUCOSE SERPL-MCNC: 72 MG/DL (ref 65–100)
HCT VFR BLD AUTO: 44 % (ref 35–47)
HGB BLD-MCNC: 13 G/DL (ref 11.5–16)
INR PPP: 1 (ref 0.9–1.1)
LYMPHOCYTES # BLD: 0.9 K/UL (ref 0.8–3.5)
LYMPHOCYTES NFR BLD: 7 % (ref 12–49)
MCH RBC QN AUTO: 28.3 PG (ref 26–34)
MCHC RBC AUTO-ENTMCNC: 29.5 G/DL (ref 30–36.5)
MCV RBC AUTO: 95.7 FL (ref 80–99)
MONOCYTES # BLD: 1 K/UL (ref 0–1)
MONOCYTES NFR BLD: 7 % (ref 5–13)
NEUTS SEG # BLD: 11 K/UL (ref 1.8–8)
NEUTS SEG NFR BLD: 84 % (ref 32–75)
PLATELET # BLD AUTO: 160 K/UL (ref 150–400)
POTASSIUM SERPL-SCNC: 4.2 MMOL/L (ref 3.5–5.1)
PROT SERPL-MCNC: 8.6 G/DL (ref 6.4–8.2)
PROTHROMBIN TIME: 10.2 SEC (ref 9–11.1)
RBC # BLD AUTO: 4.6 M/UL (ref 3.8–5.2)
SODIUM SERPL-SCNC: 136 MMOL/L (ref 136–145)
TROPONIN I SERPL-MCNC: <0.04 NG/ML
WBC # BLD AUTO: 13.1 K/UL (ref 3.6–11)

## 2017-09-08 PROCEDURE — 80053 COMPREHEN METABOLIC PANEL: CPT | Performed by: EMERGENCY MEDICINE

## 2017-09-08 PROCEDURE — 85025 COMPLETE CBC W/AUTO DIFF WBC: CPT | Performed by: EMERGENCY MEDICINE

## 2017-09-08 PROCEDURE — 84484 ASSAY OF TROPONIN QUANT: CPT | Performed by: EMERGENCY MEDICINE

## 2017-09-08 PROCEDURE — 94640 AIRWAY INHALATION TREATMENT: CPT

## 2017-09-08 PROCEDURE — 85610 PROTHROMBIN TIME: CPT | Performed by: EMERGENCY MEDICINE

## 2017-09-08 PROCEDURE — 74011000250 HC RX REV CODE- 250: Performed by: EMERGENCY MEDICINE

## 2017-09-08 PROCEDURE — 99285 EMERGENCY DEPT VISIT HI MDM: CPT

## 2017-09-08 PROCEDURE — 82550 ASSAY OF CK (CPK): CPT | Performed by: EMERGENCY MEDICINE

## 2017-09-08 PROCEDURE — 74011250636 HC RX REV CODE- 250/636: Performed by: EMERGENCY MEDICINE

## 2017-09-08 PROCEDURE — 93005 ELECTROCARDIOGRAM TRACING: CPT

## 2017-09-08 PROCEDURE — 71010 XR CHEST PORT: CPT

## 2017-09-08 PROCEDURE — 96374 THER/PROPH/DIAG INJ IV PUSH: CPT

## 2017-09-08 PROCEDURE — 36415 COLL VENOUS BLD VENIPUNCTURE: CPT | Performed by: EMERGENCY MEDICINE

## 2017-09-08 PROCEDURE — 83880 ASSAY OF NATRIURETIC PEPTIDE: CPT | Performed by: EMERGENCY MEDICINE

## 2017-09-08 RX ORDER — SODIUM CHLORIDE 0.9 % (FLUSH) 0.9 %
5-10 SYRINGE (ML) INJECTION AS NEEDED
Status: DISCONTINUED | OUTPATIENT
Start: 2017-09-08 | End: 2017-09-09 | Stop reason: HOSPADM

## 2017-09-08 RX ORDER — FUROSEMIDE 10 MG/ML
40 INJECTION INTRAMUSCULAR; INTRAVENOUS
Status: COMPLETED | OUTPATIENT
Start: 2017-09-08 | End: 2017-09-08

## 2017-09-08 RX ORDER — IPRATROPIUM BROMIDE AND ALBUTEROL SULFATE 2.5; .5 MG/3ML; MG/3ML
3 SOLUTION RESPIRATORY (INHALATION) ONCE
Status: COMPLETED | OUTPATIENT
Start: 2017-09-08 | End: 2017-09-08

## 2017-09-08 RX ADMIN — FUROSEMIDE 40 MG: 10 INJECTION, SOLUTION INTRAMUSCULAR; INTRAVENOUS at 23:08

## 2017-09-08 RX ADMIN — Medication 10 ML: at 21:37

## 2017-09-08 RX ADMIN — IPRATROPIUM BROMIDE AND ALBUTEROL SULFATE 3 ML: .5; 3 SOLUTION RESPIRATORY (INHALATION) at 21:36

## 2017-09-09 VITALS
TEMPERATURE: 98.3 F | BODY MASS INDEX: 47.09 KG/M2 | SYSTOLIC BLOOD PRESSURE: 156 MMHG | DIASTOLIC BLOOD PRESSURE: 97 MMHG | HEART RATE: 104 BPM | HEIGHT: 66 IN | OXYGEN SATURATION: 100 % | WEIGHT: 293 LBS | RESPIRATION RATE: 19 BRPM

## 2017-09-09 LAB
ATRIAL RATE: 105 BPM
CALCULATED P AXIS, ECG09: 55 DEGREES
CALCULATED R AXIS, ECG10: 88 DEGREES
CALCULATED T AXIS, ECG11: 44 DEGREES
DIAGNOSIS, 93000: NORMAL
P-R INTERVAL, ECG05: 172 MS
Q-T INTERVAL, ECG07: 326 MS
QRS DURATION, ECG06: 76 MS
QTC CALCULATION (BEZET), ECG08: 430 MS
VENTRICULAR RATE, ECG03: 105 BPM

## 2017-09-09 PROCEDURE — 74011250637 HC RX REV CODE- 250/637: Performed by: EMERGENCY MEDICINE

## 2017-09-09 PROCEDURE — 77030029684 HC NEB SM VOL KT MONA -A

## 2017-09-09 RX ORDER — FLUTICASONE PROPIONATE 50 MCG
2 SPRAY, SUSPENSION (ML) NASAL DAILY
Qty: 1 BOTTLE | Refills: 0 | Status: SHIPPED | OUTPATIENT
Start: 2017-09-09 | End: 2019-01-03

## 2017-09-09 RX ORDER — FLUCONAZOLE 100 MG/1
200 TABLET ORAL
Status: COMPLETED | OUTPATIENT
Start: 2017-09-09 | End: 2017-09-09

## 2017-09-09 RX ORDER — NYSTATIN 100000 U/G
CREAM TOPICAL 2 TIMES DAILY
Qty: 15 G | Refills: 0 | Status: SHIPPED | OUTPATIENT
Start: 2017-09-09 | End: 2017-09-27

## 2017-09-09 RX ADMIN — FLUCONAZOLE 200 MG: 100 TABLET ORAL at 01:01

## 2017-09-09 NOTE — ED NOTES
Dr ______Termeer__________________ in to talk with patient and explain plan of care with  understanding and  written & verbal instructions.

## 2017-09-22 ENCOUNTER — HOSPITAL ENCOUNTER (INPATIENT)
Age: 47
LOS: 3 days | Discharge: HOME HEALTH | DRG: 190 | End: 2017-09-27
Attending: EMERGENCY MEDICINE | Admitting: INTERNAL MEDICINE
Payer: MEDICARE

## 2017-09-22 ENCOUNTER — APPOINTMENT (OUTPATIENT)
Dept: CT IMAGING | Age: 47
DRG: 190 | End: 2017-09-22
Attending: EMERGENCY MEDICINE
Payer: MEDICARE

## 2017-09-22 ENCOUNTER — APPOINTMENT (OUTPATIENT)
Dept: GENERAL RADIOLOGY | Age: 47
DRG: 190 | End: 2017-09-22
Attending: EMERGENCY MEDICINE
Payer: MEDICARE

## 2017-09-22 DIAGNOSIS — J18.9 PNEUMONIA OF BOTH LUNGS DUE TO INFECTIOUS ORGANISM, UNSPECIFIED PART OF LUNG: Primary | ICD-10-CM

## 2017-09-22 PROBLEM — R06.02 SOB (SHORTNESS OF BREATH): Status: ACTIVE | Noted: 2017-09-22

## 2017-09-22 LAB
ALBUMIN SERPL-MCNC: 3.1 G/DL (ref 3.5–5)
ALBUMIN/GLOB SERPL: 0.6 {RATIO} (ref 1.1–2.2)
ALP SERPL-CCNC: 100 U/L (ref 45–117)
ALT SERPL-CCNC: 19 U/L (ref 12–78)
ANION GAP SERPL CALC-SCNC: 3 MMOL/L (ref 5–15)
APPEARANCE UR: ABNORMAL
AST SERPL-CCNC: 24 U/L (ref 15–37)
BILIRUB SERPL-MCNC: 0.6 MG/DL (ref 0.2–1)
BILIRUB UR QL: NEGATIVE
BNP SERPL-MCNC: 214 PG/ML (ref 0–125)
BUN SERPL-MCNC: 11 MG/DL (ref 6–20)
BUN/CREAT SERPL: 17 (ref 12–20)
CALCIUM SERPL-MCNC: 10.3 MG/DL (ref 8.5–10.1)
CHLORIDE SERPL-SCNC: 102 MMOL/L (ref 97–108)
CO2 SERPL-SCNC: 31 MMOL/L (ref 21–32)
COLOR UR: ABNORMAL
CREAT SERPL-MCNC: 0.63 MG/DL (ref 0.55–1.02)
D DIMER PPP FEU-MCNC: 0.9 MG/L FEU (ref 0–0.65)
GLOBULIN SER CALC-MCNC: 5.1 G/DL (ref 2–4)
GLUCOSE SERPL-MCNC: 78 MG/DL (ref 65–100)
GLUCOSE UR STRIP.AUTO-MCNC: NEGATIVE MG/DL
HCG UR QL: NEGATIVE
HGB UR QL STRIP: NEGATIVE
KETONES UR QL STRIP.AUTO: NEGATIVE MG/DL
LEUKOCYTE ESTERASE UR QL STRIP.AUTO: NEGATIVE
MAGNESIUM SERPL-MCNC: 2.1 MG/DL (ref 1.6–2.4)
NITRITE UR QL STRIP.AUTO: NEGATIVE
PH UR STRIP: 8 [PH] (ref 5–8)
POTASSIUM SERPL-SCNC: 4.7 MMOL/L (ref 3.5–5.1)
PROT SERPL-MCNC: 8.2 G/DL (ref 6.4–8.2)
PROT UR STRIP-MCNC: NEGATIVE MG/DL
SODIUM SERPL-SCNC: 136 MMOL/L (ref 136–145)
SP GR UR REFRACTOMETRY: 1.02 (ref 1–1.03)
TROPONIN I SERPL-MCNC: <0.04 NG/ML
UROBILINOGEN UR QL STRIP.AUTO: 2 EU/DL (ref 0.2–1)

## 2017-09-22 PROCEDURE — 99285 EMERGENCY DEPT VISIT HI MDM: CPT

## 2017-09-22 PROCEDURE — 96365 THER/PROPH/DIAG IV INF INIT: CPT

## 2017-09-22 PROCEDURE — 83880 ASSAY OF NATRIURETIC PEPTIDE: CPT | Performed by: EMERGENCY MEDICINE

## 2017-09-22 PROCEDURE — 99218 HC RM OBSERVATION: CPT

## 2017-09-22 PROCEDURE — 81003 URINALYSIS AUTO W/O SCOPE: CPT | Performed by: EMERGENCY MEDICINE

## 2017-09-22 PROCEDURE — 71275 CT ANGIOGRAPHY CHEST: CPT

## 2017-09-22 PROCEDURE — 96367 TX/PROPH/DG ADDL SEQ IV INF: CPT

## 2017-09-22 PROCEDURE — 77010033678 HC OXYGEN DAILY

## 2017-09-22 PROCEDURE — 71020 XR CHEST PA LAT: CPT

## 2017-09-22 PROCEDURE — 74011250636 HC RX REV CODE- 250/636: Performed by: EMERGENCY MEDICINE

## 2017-09-22 PROCEDURE — 81025 URINE PREGNANCY TEST: CPT | Performed by: EMERGENCY MEDICINE

## 2017-09-22 PROCEDURE — 80053 COMPREHEN METABOLIC PANEL: CPT | Performed by: EMERGENCY MEDICINE

## 2017-09-22 PROCEDURE — 36415 COLL VENOUS BLD VENIPUNCTURE: CPT | Performed by: EMERGENCY MEDICINE

## 2017-09-22 PROCEDURE — 85379 FIBRIN DEGRADATION QUANT: CPT | Performed by: EMERGENCY MEDICINE

## 2017-09-22 PROCEDURE — 74011636320 HC RX REV CODE- 636/320: Performed by: EMERGENCY MEDICINE

## 2017-09-22 PROCEDURE — 74011250637 HC RX REV CODE- 250/637: Performed by: INTERNAL MEDICINE

## 2017-09-22 PROCEDURE — 84484 ASSAY OF TROPONIN QUANT: CPT | Performed by: EMERGENCY MEDICINE

## 2017-09-22 PROCEDURE — 74011000258 HC RX REV CODE- 258: Performed by: EMERGENCY MEDICINE

## 2017-09-22 PROCEDURE — 83735 ASSAY OF MAGNESIUM: CPT | Performed by: EMERGENCY MEDICINE

## 2017-09-22 RX ORDER — METOPROLOL TARTRATE 25 MG/1
25 TABLET, FILM COATED ORAL 2 TIMES DAILY
Status: DISCONTINUED | OUTPATIENT
Start: 2017-09-23 | End: 2017-09-27 | Stop reason: HOSPADM

## 2017-09-22 RX ORDER — DOCUSATE SODIUM 100 MG/1
100 CAPSULE, LIQUID FILLED ORAL
Status: DISCONTINUED | OUTPATIENT
Start: 2017-09-22 | End: 2017-09-27 | Stop reason: HOSPADM

## 2017-09-22 RX ORDER — SODIUM CHLORIDE 0.9 % (FLUSH) 0.9 %
5-10 SYRINGE (ML) INJECTION EVERY 8 HOURS
Status: DISCONTINUED | OUTPATIENT
Start: 2017-09-22 | End: 2017-09-27 | Stop reason: HOSPADM

## 2017-09-22 RX ORDER — PREDNISONE 20 MG/1
40 TABLET ORAL
Status: DISCONTINUED | OUTPATIENT
Start: 2017-09-23 | End: 2017-09-27 | Stop reason: HOSPADM

## 2017-09-22 RX ORDER — FLUTICASONE PROPIONATE 50 MCG
2 SPRAY, SUSPENSION (ML) NASAL DAILY
Status: DISCONTINUED | OUTPATIENT
Start: 2017-09-23 | End: 2017-09-27 | Stop reason: HOSPADM

## 2017-09-22 RX ORDER — SODIUM CHLORIDE 0.9 % (FLUSH) 0.9 %
10 SYRINGE (ML) INJECTION
Status: COMPLETED | OUTPATIENT
Start: 2017-09-22 | End: 2017-09-22

## 2017-09-22 RX ORDER — ALBUTEROL SULFATE 0.83 MG/ML
2.5 SOLUTION RESPIRATORY (INHALATION)
Status: DISCONTINUED | OUTPATIENT
Start: 2017-09-22 | End: 2017-09-27 | Stop reason: HOSPADM

## 2017-09-22 RX ORDER — ASPIRIN 81 MG/1
81 TABLET ORAL DAILY
Status: DISCONTINUED | OUTPATIENT
Start: 2017-09-23 | End: 2017-09-27 | Stop reason: HOSPADM

## 2017-09-22 RX ORDER — PRAVASTATIN SODIUM 40 MG/1
20 TABLET ORAL
Status: DISCONTINUED | OUTPATIENT
Start: 2017-09-22 | End: 2017-09-27 | Stop reason: HOSPADM

## 2017-09-22 RX ORDER — LISINOPRIL 20 MG/1
40 TABLET ORAL DAILY
Status: DISCONTINUED | OUTPATIENT
Start: 2017-09-23 | End: 2017-09-27 | Stop reason: HOSPADM

## 2017-09-22 RX ORDER — INSULIN LISPRO 100 [IU]/ML
INJECTION, SOLUTION INTRAVENOUS; SUBCUTANEOUS
Status: DISCONTINUED | OUTPATIENT
Start: 2017-09-23 | End: 2017-09-27 | Stop reason: HOSPADM

## 2017-09-22 RX ORDER — ACETAMINOPHEN 325 MG/1
650 TABLET ORAL
Status: DISCONTINUED | OUTPATIENT
Start: 2017-09-22 | End: 2017-09-27 | Stop reason: HOSPADM

## 2017-09-22 RX ORDER — SODIUM CHLORIDE 0.9 % (FLUSH) 0.9 %
5-10 SYRINGE (ML) INJECTION AS NEEDED
Status: DISCONTINUED | OUTPATIENT
Start: 2017-09-22 | End: 2017-09-27 | Stop reason: HOSPADM

## 2017-09-22 RX ORDER — SODIUM CHLORIDE 9 MG/ML
50 INJECTION, SOLUTION INTRAVENOUS
Status: COMPLETED | OUTPATIENT
Start: 2017-09-22 | End: 2017-09-22

## 2017-09-22 RX ORDER — ENOXAPARIN SODIUM 100 MG/ML
40 INJECTION SUBCUTANEOUS EVERY 24 HOURS
Status: DISCONTINUED | OUTPATIENT
Start: 2017-09-23 | End: 2017-09-25

## 2017-09-22 RX ORDER — FUROSEMIDE 40 MG/1
40 TABLET ORAL DAILY
Status: DISCONTINUED | OUTPATIENT
Start: 2017-09-23 | End: 2017-09-27 | Stop reason: HOSPADM

## 2017-09-22 RX ORDER — ONDANSETRON 2 MG/ML
4 INJECTION INTRAMUSCULAR; INTRAVENOUS
Status: DISCONTINUED | OUTPATIENT
Start: 2017-09-22 | End: 2017-09-27 | Stop reason: HOSPADM

## 2017-09-22 RX ADMIN — Medication 10 ML: at 19:35

## 2017-09-22 RX ADMIN — PRAVASTATIN SODIUM 20 MG: 40 TABLET ORAL at 23:22

## 2017-09-22 RX ADMIN — SODIUM CHLORIDE 50 ML/HR: 900 INJECTION, SOLUTION INTRAVENOUS at 19:35

## 2017-09-22 RX ADMIN — AZITHROMYCIN MONOHYDRATE 500 MG: 500 INJECTION, POWDER, LYOPHILIZED, FOR SOLUTION INTRAVENOUS at 21:07

## 2017-09-22 RX ADMIN — CEFTRIAXONE 1 G: 1 INJECTION, POWDER, FOR SOLUTION INTRAMUSCULAR; INTRAVENOUS at 20:15

## 2017-09-22 RX ADMIN — IOPAMIDOL 100 ML: 755 INJECTION, SOLUTION INTRAVENOUS at 19:34

## 2017-09-22 NOTE — ED NOTES
Bedside and Verbal shift change report given to [facility] (oncoming nurse) by Natalie Gómez (offgoing nurse). Report included the following information SBAR, ED Summary, Intake/Output, MAR and Recent Results. Pt on cardiac monitor and pulse ox. Pt refusing to have blood pressure cuff on. Call bell in reach. Pt seated in chair for comfort. Pt to [facility] with standby assistance. Pt voided, and urine sample collected and sent to lab. Pt updated on plan of care.

## 2017-09-22 NOTE — IP AVS SNAPSHOT
[address] [facility] 
[phone] Patient: Ernesto Cobian MRN: [id number] AIX:4/51/5690 You are allergic to the following No active allergies Recent Documentation Weight BMI OB Status Smoking Status (!) 185.5 kg 66.01 kg/m2 Injection Current Every Day Smoker Emergency Contacts Name Discharge Info Relation Home Work Mobile Edwar Ro N/A  AT THIS TIME [6] Child [2] [phone] Migdalia Amado [5]   [phone] About your hospitalization You were admitted on:  September 22, 2017 You last received care in the:  [facility] 2 CARDIOPULMONARY CARE You were discharged on:  September 27, 2017 Unit phone number:  [phone] Why you were hospitalized Your primary diagnosis was:  Multifocal Pneumonia Your diagnoses also included:  Sob (Shortness Of Breath), Shortness Of Breath, Type Ii Diabetes Mellitus, Uncontrolled (Hcc), Obesity, Morbid (More Than 100 Lbs Over Ideal Weight Or Bmi > 40) (Hcc), Chf (Congestive Heart Failure) (Hcc), Cellulitis, Lymphedema Of Both Lower Extremities Providers Seen During Your Hospitalizations Provider Role Specialty Primary office phone Julio Colindres DO Attending Provider Emergency Medicine [phone] Vicenta Longo MD Attending Provider Internal Medicine [phone] Froilan Ramos MD Attending Provider Internal Medicine [phone] Your Primary Care Physician (PCP) Primary Care Physician Office Phone Office Fax UNKNOWN, PROVIDER ** None ** ** None ** Follow-up Information Follow up With Details Comments Contact Info Additional Information [facility] SLEEP LAB STEVE Go on 10/4/2017 Follow up at 9:00AM with Dr. Daniel Morgan. If you have a CPAP machine, please bring it to the appointment. [address] [facility] [address]   [address] [address] 
[phone] SLEEP LAB AT [address] [facility] MEDICAL OFFICE BUILDING 2 [address] (second floor) [address] [facility] Telephone# [phone]  Do not arrive more than 15 minutes prior to appt time and use the doorbell located to the left of the door to enter into [address]. Melyssa Parry MD Schedule an appointment as soon as possible for a visit to be seen within 2 weeks [address] [facility] [address] [facility] [address] 
[phone] At 1 [facility]  This is your Home Health provider. They will contact you after discharge to arrange the first visit. [phone] Your Appointments Wednesday October 04, 2017  9:00 AM EDT Any with Ajit Gaxiola MD  
[address] [facility] ([address] [facility]) [address] [place] [address], [address] [phone] Current Discharge Medication List  
  
START taking these medications Dose & Instructions Dispensing Information Comments Morning Noon Evening Bedtime  
 benzonatate 100 mg capsule Commonly known as:  TESSALON Your last dose was: Your next dose is:    
   
   
 Dose:  100 mg Take 1 Cap by mouth three (3) times daily as needed for Cough. Quantity:  15 Cap Refills:  0  
     
   
   
   
  
 docusate sodium 100 mg capsule Commonly known as:  Genice Flax Your last dose was: Your next dose is:    
   
   
 Dose:  100 mg Take 1 Cap by mouth two (2) times daily as needed for Constipation for up to 90 days. Quantity:  60 Cap Refills:  0  
     
   
   
   
  
 guaiFENesin  mg ER tablet Commonly known as:  Jeff & Jeff Your last dose was: Your next dose is:    
   
   
 Dose:  600 mg Take 1 Tab by mouth every twelve (12) hours for 7 days. Quantity:  14 Tab Refills:  0  
     
   
   
   
  
 levoFLOXacin 750 mg tablet Commonly known as:  Hi Tucker  
   
 Your last dose was: Your next dose is:    
   
   
 Dose:  750 mg Take 1 Tab by mouth daily. Indications: multifocal pneumonia Quantity:  7 Tab Refills:  0  
     
   
   
   
  
 predniSONE 20 mg tablet Commonly known as:  Lannis Martha Your last dose was: Your next dose is:    
   
   
 Dose:  40 mg Take 2 Tabs by mouth daily (with breakfast). 2 tab once a day for 2 days, 1 tab once a day for 2 days, Then 1/2 tab once a day for 4 days Quantity:  8 Tab Refills:  0 CONTINUE these medications which have NOT CHANGED Dose & Instructions Dispensing Information Comments Morning Noon Evening Bedtime  
 albuterol 2.5 mg /3 mL (0.083 %) nebulizer solution Commonly known as:  PROVENTIL VENTOLIN Your last dose was: Your next dose is:    
   
   
 Dose:  2.5 mg  
3 mL by Nebulization route every four (4) hours as needed for Wheezing. Quantity:  24 Each Refills:  0  
     
   
   
   
  
 albuterol 90 mcg/actuation inhaler Commonly known as:  Obi Moise Your last dose was: Your next dose is:    
   
   
 Dose:  1-2 Puff Take 1-2 Puffs by inhalation every four (4) hours as needed for Wheezing. Quantity:  17 g Refills:  0  
     
   
   
   
  
 ammonium lactate 12 % topical cream  
Commonly known as:  LAC-HYDRIN Your last dose was: Your next dose is:    
   
   
 rub in to affected area well Quantity:  280 g Refills:  1  
     
   
   
   
  
 aspirin delayed-release 81 mg tablet Your last dose was: Your next dose is:    
   
   
 Dose:  81 mg Take 81 mg by mouth daily. Refills:  0  
     
   
   
   
  
 cetirizine 10 mg tablet Commonly known as:  ZYRTEC Your last dose was: Your next dose is:    
   
   
 Dose:  10 mg Take 10 mg by mouth daily as needed for Allergies.   
 Refills:  0  
     
   
   
   
  
 clotrimazole 1 % topical cream  
 Commonly known as:  Mal Moore Your last dose was: Your next dose is:    
   
   
 Apply to the affected areas twice daily until healed Quantity:  15 g Refills:  0  
     
   
   
   
  
 fluticasone 50 mcg/actuation nasal spray Commonly known as:  Nereida Sale Your last dose was: Your next dose is:    
   
   
 Dose:  2 Spray 2 Sprays by Both Nostrils route daily. Quantity:  1 Bottle Refills:  0  
     
   
   
   
  
 furosemide 40 mg tablet Commonly known as:  LASIX Your last dose was: Your next dose is:    
   
   
 Dose:  40 mg Take 40 mg by mouth daily. Refills:  0  
     
   
   
   
  
 glyBURIDE 5 mg tablet Commonly known as:  Patrice Blowers Your last dose was: Your next dose is:    
   
   
 Dose:  5 mg Take 5 mg by mouth Daily (before breakfast). Refills:  0  
     
   
   
   
  
 ketoconazole 2 % topical cream  
Commonly known as:  NIZORAL Your last dose was: Your next dose is:    
   
   
 Apply  to affected area daily. Refills:  0  
     
   
   
   
  
 l.acidoph-B.lactis-B.longum 460 mg (7.5-6- 1.5 bill. cell) Cap cap Commonly known as:  DINIPFPC0 Your last dose was: Your next dose is:    
   
   
 Dose:  1 Cap Take 1 Cap by mouth Daily (before breakfast). Quantity:  7 Cap Refills:  0  
     
   
   
   
  
 lisinopril 40 mg tablet Commonly known as:  Helene Fess Your last dose was: Your next dose is:    
   
   
 Dose:  40 mg Take 40 mg by mouth daily. Refills:  0  
     
   
   
   
  
 lovastatin 40 mg tablet Commonly known as:  MEVACOR Your last dose was: Your next dose is:    
   
   
 Dose:  40 mg Take 1 Tab by mouth nightly. Quantity:  30 Tab Refills:  6  
     
   
   
   
  
 metoprolol tartrate 25 mg tablet Commonly known as:  LOPRESSOR Your last dose was:     
   
Your next dose is:    
   
   
 Dose:  25 mg  
 Take 25 mg by mouth two (2) times a day. Refills:  0  
     
   
   
   
  
 nitroglycerin 0.4 mg SL tablet Commonly known as:  NITROSTAT Your last dose was: Your next dose is:    
   
   
 Dose:  0.4 mg  
1 Tab by SubLINGual route every five (5) minutes as needed for Chest Pain (call 911 if not relieved by 3). Quantity:  25 Tab Refills:  2  
     
   
   
   
  
 omeprazole 40 mg capsule Commonly known as:  PRILOSEC Your last dose was: Your next dose is:    
   
   
 Dose:  40 mg Take 40 mg by mouth daily. Refills:  0 STOP taking these medications HYDROcodone-acetaminophen 5-325 mg per tablet Commonly known as:  NORCO  
   
  
 medroxyPROGESTERone 150 mg/mL injection Commonly known as:  DEPO-PROVERA  
   
  
 nystatin topical cream  
Commonly known as:  MYCOSTATIN Where to Get Your Medications Information on where to get these meds will be given to you by the nurse or doctor. ! Ask your nurse or doctor about these medications  
  benzonatate 100 mg capsule  
 docusate sodium 100 mg capsule  
 guaiFENesin  mg ER tablet  
 levoFLOXacin 750 mg tablet  
 predniSONE 20 mg tablet Discharge Instructions Patient Discharge Instructions Pt Name  John Adams Date of Birth 1970 Age  41 y.o. Medical Record Number  [id number] PCP PROVIDER UNKNOWN Admit date:  9/22/2017 @    [address] [place] [address] Room Number  2220/01 Date of Discharge 9/27/2017 Admission Diagnoses:     Multifocal pneumonia No Known Allergies You were admitted to [address] [place] [address] for  Multifocal pneumonia YOUR OTHER MEDICAL DIAGNOSES INCLUDE (BUT NOT LIMITED TO ): 
Present on Admission: 
 SOB (shortness of breath)  Shortness of breath  Type II diabetes mellitus, uncontrolled ([address]  Obesity, morbid (more than 100 lbs over ideal weight or BMI > 40) ([facility])  CHF (congestive heart failure) ([facility] [address]  Cellulitis  Lymphedema of both lower extremities  Multifocal pneumonia DIET:  Cardiac Diet and Diabetic Diet Recommended activity: Activity as tolerated Follow up : Follow-up Information Follow up With Details Comments Contact Info Additional Information [facility] SLEEP LAB STEVE Go on 10/4/2017 Follow up at 9:00AM with Dr. Dao Amaro. If you have a CPAP machine, please bring it to the appointment. [address] [facility] [address]   [address] [phone] SLEEP LAB AT [address] [facility] BUILDING [address] (second floor) [address] [facility] Telephone# [phone]  Do not arrive more than 15 minutes prior to appt time and use the doorbell located to the left of the door to enter into [address]. Wound Care for LLE posterior weeping skin and BLE and feet skin should switch to M-W-F wound care as follows: cleanse with 1/4 Dakins solution and wash BLE with soap and water. Apply lac-hydrin lotion to BLE and feet skin. Cover weeping area with Aquacell=AG silver hydrafiber and wrap with dry dressing. Ultimately she needs to be seen at a lymphedema clinic [facility] or [place]), because this problem of her BLE is not going to go away with basic wound care. · It is important that you take the medication exactly as they are prescribed. · Keep your medication in the bottles provided by the pharmacist and keep a list of the medication names, dosages, and times to be taken in your wallet. · Do not take other medications without consulting your doctor.   
 
 
ADDITIONAL INFORMATION: If you experience any of the following symptoms or have any health problem not listed below, then please call your primary care physician or return to the emergency room if you cannot get hold of your doctor: Fever, chills, nausea, vomiting, diarrhea, change in mentation, falling, bleeding, shortness of breath. I understand that if any problems occur once I am discharged, I am supposed to call my Primary care physician for further care or seek help in the Emergency Department at the nearest Healthcare facility. I have had an opportunity to discuss my clinical issues with my doctor and nursing staff. I understand and acknowledge receipt of the above instructions. Physician's or R.N.'s Signature                                                            Date/Time Patient or Representative Signature                                                 Date/Time Discharge Instructions Attachments/References HEART FAILURE ZONES: GENERAL INFO (ENGLISH) HEART FAILURE: AVOIDING TRIGGERS (ENGLISH) Discharge Orders None [facility] Announcement We are excited to announce that we are making your provider's discharge notes available to you in [employer]. You will see these notes when they are completed and signed by the physician that discharged you from your recent hospital stay. If you have any questions or concerns about any information you see in [employer], please call the Health Information Department where you were seen or reach out to your Primary Care Provider for more information about your plan of care. Introducing [facility] [place] [facility]! [facility] introduces [employer] patient portal. Now you can access parts of your medical record, email your doctor's office, and request medication refills online. 1. In your internet browser, go to [email or web address]://[email or web address]. "[employer] 2. Click on the First Time User? Click Here link in the Sign In box.  You will see the New Member Sign Up page. 3. Enter your [address] [employer] Access Code exactly as it appears below. You will not need to use this code after youve completed the sign-up process. If you do not sign up before the expiration date, you must request a new code. · [address] [employer] Access Code: [email or web address] Expires: 12/7/2017 11:34 PM 
 
4. Enter the last four digits of your Social Security Number (xxxx) and Date of Birth (mm/dd/yyyy) as indicated and click Submit. You will be taken to the next sign-up page. 5. Create a [address] [employer] ID. This will be your [address] [employer] login ID and cannot be changed, so think of one that is secure and easy to remember. 6. Create a [address] [employer] password. You can change your password at any time. 7. Enter your Password Reset Question and Answer. This can be used at a later time if you forget your password. 8. Enter your e-mail address. You will receive e-mail notification when new information is available in [address] 9. Click Sign Up. You can now view and download portions of your medical record. 10. Click the Download Summary menu link to download a portable copy of your medical information. If you have questions, please visit the Frequently Asked Questions section of the [facility] [employer] website. Remember, [facility] [address] [employer] is NOT to be used for urgent needs. For medical emergencies, dial 911. Now available from your iPhone and Android! General Information Please provide this summary of care documentation to your next provider. Patient Signature:  ____________________________________________________________ Date:  ____________________________________________________________  
  
[facility]:  ____________________________________________________________ Date:  ____________________________________________________________ More Information Learning About Heart Failure Zones What are heart failure zones? Heart failure zones give you an easy way to see changes in your heart failure symptoms. They also tell you when you need to get help. Check every day to see which zone you are in. Green zone. You are doing well. This is where you want to be. · Your weight is stable. This means it is not going up or down. · You breathe easily. · You are sleeping well. You are able to lie flat without shortness of breath. · You can do your usual activities. Yellow zone. Be careful. Your symptoms are changing. Call your doctor. · You have new or increased shortness of breath. · You are dizzy or lightheaded, or you feel like you may faint. · You have sudden weight gain, such as more than 2 to 3 pounds in a day or 5 pounds in a week. (Your doctor may suggest a different range of weight gain.) · You have increased swelling in your legs, ankles, or feet. · You are so tired or weak that you cannot do your usual activities. · You are not sleeping well. Shortness of breath wakes you up at night. You need extra pillows. Your doctor's name: ____________________________________________________________ Your doctor's contact information: _________________________________________________ Red zone. This is an emergency. Call 911. You have symptoms of sudden heart failure, such as: 
· You have severe trouble breathing. · You cough up pink, foamy mucus. · You have a new irregular or fast heartbeat. You have symptoms of a heart attack. These may include: · Chest pain or pressure, or a strange feeling in the chest. 
· Sweating. · Shortness of breath. · Nausea or vomiting. · Pain, pressure, or a strange feeling in the back, neck, jaw, or upper belly or in one or both shoulders or arms. · Lightheadedness or sudden weakness. · A fast or irregular heartbeat. If you have symptoms of a heart attack: After you call 911, the  may tell you to chew 1 adult-strength or 2 to 4 low-dose aspirin.  Wait for an ambulance. Do not try to drive yourself. Follow-up care is a key part of your treatment and safety. Be sure to make and go to all appointments, and call your doctor if you are having problems. It's also a good idea to know your test results and keep a list of the medicines you take. Where can you learn more? Go to http://cassia-donte.info/. Enter T174 in the search box to learn more about \"Learning About Heart Failure Zones. \" Current as of: February 23, 2017 Content Version: 11.3 © 0192-2756 [employer]. Care instructions adapted under license by [employer] (which disclaims liability or warranty for this information). If you have questions about a medical condition or this instruction, always ask your healthcare professional. [address] any warranty or liability for your use of this information. Avoiding Triggers With Heart Failure: Care Instructions Your Care Instructions Triggers are anything that make your heart failure flare up. A flare-up is also called \"sudden heart failure\" or \"acute heart failure. \" When you have a flare-up, fluid builds up in your lungs, and you have problems breathing. You might need to go to the hospital. By watching for changes in your condition and avoiding triggers, you can prevent heart failure flare-ups. Follow-up care is a key part of your treatment and safety. Be sure to make and go to all appointments, and call your doctor if you are having problems. It's also a good idea to know your test results and keep a list of the medicines you take. How can you care for yourself at home? Watch for changes in your weight and condition · Weigh yourself without clothing at the same time each day. Record your weight. Call your doctor if you have sudden weight gain, such as more than 2 to 3 pounds in a day or 5 pounds in a week.  (Your doctor may suggest a different range of weight gain.) A sudden weight gain may mean that your heart failure is getting worse. · Keep a daily record of your symptoms. Write down any changes in how you feel, such as new shortness of breath, cough, or problems eating. Also record if your ankles are more swollen than usual and if you feel more tired than usual. Note anything that you ate or did that could have triggered these changes. Limit sodium Sodium causes your body to hold on to extra water. This may cause your heart failure symptoms to get worse. People get most of their sodium from processed foods. Fast food and restaurant meals also tend to be very high in sodium. · Your doctor may suggest that you limit sodium to 2,000 milligrams (mg) a day or less. That is less than 1 teaspoon of salt a day, including all the salt you eat in cooking or in packaged foods. · Read food labels on cans and food packages. They tell you how much sodium you get in one serving. Check the serving size. If you eat more than one serving, you are getting more sodium. · Be aware that sodium can come in forms other than salt, including monosodium glutamate (MSG), sodium citrate, and sodium bicarbonate (baking soda). MSG is often added to Asian food. You can sometimes ask for food without MSG or salt. · Slowly reducing salt will help you adjust to the taste. Take the salt shaker off the table. · Flavor your food with garlic, lemon juice, onion, vinegar, herbs, and spices instead of salt. Do not use soy sauce, steak sauce, onion salt, garlic salt, mustard, or ketchup on your food, unless it is labeled \"low-sodium\" or \"low-salt. \" 
· Make your own salad dressings, sauces, and ketchup without adding salt. · Use fresh or frozen ingredients, instead of canned ones, whenever you can. Choose low-sodium canned goods. · Eat less processed food and food from restaurants, including fast food. Exercise as directed Moderate, regular exercise is very good for your heart. It improves your blood flow and helps control your weight. But too much exercise can stress your heart and cause a heart failure flare-up. · Check with your doctor before you start an exercise program. 
· Walking is an easy way to get exercise. Start out slowly. Gradually increase the length and pace of your walk. Swimming, riding a bike, and using a treadmill are also good forms of exercise. · When you exercise, watch for signs that your heart is working too hard. You are pushing yourself too hard if you cannot talk while you are exercising. If you become short of breath or dizzy or have chest pain, stop, sit down, and rest. 
· Do not exercise when you do not feel well. Take medicines correctly · Take your medicines exactly as prescribed. Call your doctor if you think you are having a problem with your medicine. · Make a list of all the medicines you take. Include those prescribed to you by other doctors and any over-the-counter medicines, vitamins, or supplements you take. Take this list with you when you go to any doctor. · Take your medicines at the same time every day. It may help you to post a list of all the medicines you take every day and what time of day you take them. · Make taking your medicine as simple as you can. Plan times to take your medicines when you are doing other things, such as eating a meal or getting ready for bed. This will make it easier to remember to take your medicines. · Get organized. Use helpful tools, such as daily or weekly pill containers. When should you call for help? Call 911 if you have symptoms of sudden heart failure such as: 
· You have severe trouble breathing. · You cough up pink, foamy mucus. · You have a new irregular or rapid heartbeat. Call your doctor now or seek immediate medical care if: 
· You have new or increased shortness of breath. · You are dizzy or lightheaded, or you feel like you may faint. · You have sudden weight gain, such as more than 2 to 3 pounds in a day or 5 pounds in a week. (Your doctor may suggest a different range of weight gain.) · You have increased swelling in your legs, ankles, or feet. · You are suddenly so tired or weak that you cannot do your usual activities. Watch closely for changes in your health, and be sure to contact your doctor if you develop new symptoms. Where can you learn more? Go to http://cassia-donte.info/. Enter B160 in the search box to learn more about \"Avoiding Triggers With Heart Failure: Care Instructions. \" Current as of: February 23, 2017 Content Version: 11.3 © 1874-0196 [employer]. Care instructions adapted under license by [employer] (which disclaims liability or warranty for this information). If you have questions about a medical condition or this instruction, always ask your healthcare professional. [address] any warranty or liability for your use of this information.

## 2017-09-22 NOTE — ED TRIAGE NOTES
Assumed care of patient from EMS. Patient is alert and oriented, with complaints of worsening shortness of breath and \"cannot tolerate oxygen in the nose\". Reports supposed to wear five liters at home, however \" it is too much in my nose and I need to turn it down\". Patient is in no acute distress, 95% on baseline five liters. Patient reports worsening SOB since yesterday, reports she is not coughing up what she should be coughing up with her neb treatments. Reports cough, with yellow and brown mucus. Denies fever or chills. Denies abd pain. Denies chest pain. The patient is connected to the monitor, blood pressure and continuous pulse oximetry and the cardiac monitor. Patient is resting comfortably, bed in the lowest position, side rails raised, call bell in hand, lights dim. Instructed patient to not get up without assistance, and to ring the call bell for any questions or concerns. Updated patient on the plan of care.

## 2017-09-22 NOTE — IP AVS SNAPSHOT
[address] [facility] 
[phone] Patient: Rodrigo Hutchins MRN: [id number] Current Discharge Medication List  
  
START taking these medications Dose & Instructions Dispensing Information Comments Morning Noon Evening Bedtime  
 benzonatate 100 mg capsule Commonly known as:  TESSALON Your last dose was: Your next dose is:    
   
   
 Dose:  100 mg Take 1 Cap by mouth three (3) times daily as needed for Cough. Quantity:  15 Cap Refills:  0  
     
   
   
   
  
 docusate sodium 100 mg capsule Commonly known as:  Montana Mars Your last dose was: Your next dose is:    
   
   
 Dose:  100 mg Take 1 Cap by mouth two (2) times daily as needed for Constipation for up to 90 days. Quantity:  60 Cap Refills:  0  
     
   
   
   
  
 guaiFENesin  mg ER tablet Commonly known as:  Jeff & Jeff Your last dose was: Your next dose is:    
   
   
 Dose:  600 mg Take 1 Tab by mouth every twelve (12) hours for 7 days. Quantity:  14 Tab Refills:  0  
     
   
   
   
  
 levoFLOXacin 750 mg tablet Commonly known as:  Emre Sellers Your last dose was: Your next dose is:    
   
   
 Dose:  750 mg Take 1 Tab by mouth daily. Indications: multifocal pneumonia Quantity:  7 Tab Refills:  0  
     
   
   
   
  
 predniSONE 20 mg tablet Commonly known as:  Nela Austin Your last dose was: Your next dose is:    
   
   
 Dose:  40 mg Take 2 Tabs by mouth daily (with breakfast). 2 tab once a day for 2 days, 1 tab once a day for 2 days, Then 1/2 tab once a day for 4 days Quantity:  8 Tab Refills:  0 CONTINUE these medications which have NOT CHANGED Dose & Instructions Dispensing Information Comments Morning Noon Evening Bedtime  
 albuterol 2.5 mg /3 mL (0.083 %) nebulizer solution Commonly known as:  PROVENTIL VENTOLIN Your last dose was: Your next dose is:    
   
   
 Dose:  2.5 mg  
3 mL by Nebulization route every four (4) hours as needed for Wheezing. Quantity:  24 Each Refills:  0  
     
   
   
   
  
 albuterol 90 mcg/actuation inhaler Commonly known as:  Kim Cheney Your last dose was: Your next dose is:    
   
   
 Dose:  1-2 Puff Take 1-2 Puffs by inhalation every four (4) hours as needed for Wheezing. Quantity:  17 g Refills:  0  
     
   
   
   
  
 ammonium lactate 12 % topical cream  
Commonly known as:  LAC-HYDRIN Your last dose was: Your next dose is:    
   
   
 rub in to affected area well Quantity:  280 g Refills:  1  
     
   
   
   
  
 aspirin delayed-release 81 mg tablet Your last dose was: Your next dose is:    
   
   
 Dose:  81 mg Take 81 mg by mouth daily. Refills:  0  
     
   
   
   
  
 cetirizine 10 mg tablet Commonly known as:  ZYRTEC Your last dose was: Your next dose is:    
   
   
 Dose:  10 mg Take 10 mg by mouth daily as needed for Allergies. Refills:  0  
     
   
   
   
  
 clotrimazole 1 % topical cream  
Commonly known as:  Luis Sotomayor Your last dose was: Your next dose is:    
   
   
 Apply to the affected areas twice daily until healed Quantity:  15 g Refills:  0  
     
   
   
   
  
 fluticasone 50 mcg/actuation nasal spray Commonly known as:  Marthachey Jarvisr Your last dose was: Your next dose is:    
   
   
 Dose:  2 Spray 2 Sprays by Both Nostrils route daily. Quantity:  1 Bottle Refills:  0  
     
   
   
   
  
 furosemide 40 mg tablet Commonly known as:  LASIX Your last dose was: Your next dose is:    
   
   
 Dose:  40 mg Take 40 mg by mouth daily. Refills:  0  
     
   
   
   
  
 glyBURIDE 5 mg tablet Commonly known as:  Tressia Sarah Your last dose was: Your next dose is:    
   
   
 Dose:  5 mg Take 5 mg by mouth Daily (before breakfast). Refills:  0  
     
   
   
   
  
 ketoconazole 2 % topical cream  
Commonly known as:  NIZORAL Your last dose was: Your next dose is:    
   
   
 Apply  to affected area daily. Refills:  0  
     
   
   
   
  
 l.acidoph-B.lactis-B.longum 460 mg (7.5-6- 1.5 bill. cell) Cap cap Commonly known as:  MRVLIDDW5 Your last dose was: Your next dose is:    
   
   
 Dose:  1 Cap Take 1 Cap by mouth Daily (before breakfast). Quantity:  7 Cap Refills:  0  
     
   
   
   
  
 lisinopril 40 mg tablet Commonly known as:  Shelbie Rhys Your last dose was: Your next dose is:    
   
   
 Dose:  40 mg Take 40 mg by mouth daily. Refills:  0  
     
   
   
   
  
 lovastatin 40 mg tablet Commonly known as:  MEVACOR Your last dose was: Your next dose is:    
   
   
 Dose:  40 mg Take 1 Tab by mouth nightly. Quantity:  30 Tab Refills:  6  
     
   
   
   
  
 metoprolol tartrate 25 mg tablet Commonly known as:  LOPRESSOR Your last dose was: Your next dose is:    
   
   
 Dose:  25 mg Take 25 mg by mouth two (2) times a day. Refills:  0  
     
   
   
   
  
 nitroglycerin 0.4 mg SL tablet Commonly known as:  NITROSTAT Your last dose was: Your next dose is:    
   
   
 Dose:  0.4 mg  
1 Tab by SubLINGual route every five (5) minutes as needed for Chest Pain (call 911 if not relieved by 3). Quantity:  25 Tab Refills:  2  
     
   
   
   
  
 omeprazole 40 mg capsule Commonly known as:  PRILOSEC Your last dose was: Your next dose is:    
   
   
 Dose:  40 mg Take 40 mg by mouth daily. Refills:  0 STOP taking these medications HYDROcodone-acetaminophen 5-325 mg per tablet Commonly known as:  Eve Rowley  
   
  
 medroxyPROGESTERone 150 mg/mL injection Commonly known as:  DEPO-PROVERA  
   
  
 nystatin topical cream  
Commonly known as:  MYCOSTATIN Where to Get Your Medications Information on where to get these meds will be given to you by the nurse or doctor. ! Ask your nurse or doctor about these medications  
  benzonatate 100 mg capsule  
 docusate sodium 100 mg capsule  
 guaiFENesin  mg ER tablet  
 levoFLOXacin 750 mg tablet  
 predniSONE 20 mg tablet

## 2017-09-23 LAB
ANION GAP SERPL CALC-SCNC: 8 MMOL/L (ref 5–15)
ARTERIAL PATENCY WRIST A: YES
BASE EXCESS BLDA CALC-SCNC: 3.2 MMOL/L
BDY SITE: ABNORMAL
BUN SERPL-MCNC: 11 MG/DL (ref 6–20)
BUN/CREAT SERPL: 18 (ref 12–20)
CALCIUM SERPL-MCNC: 10.6 MG/DL (ref 8.5–10.1)
CHLORIDE SERPL-SCNC: 102 MMOL/L (ref 97–108)
CO2 SERPL-SCNC: 26 MMOL/L (ref 21–32)
CREAT SERPL-MCNC: 0.6 MG/DL (ref 0.55–1.02)
ERYTHROCYTE [DISTWIDTH] IN BLOOD BY AUTOMATED COUNT: 14.8 % (ref 11.5–14.5)
FIO2 ON VENT: 40 %
GAS FLOW.O2 O2 DELIVERY SYS: 5 L/MIN
GLUCOSE BLD STRIP.AUTO-MCNC: 151 MG/DL (ref 65–100)
GLUCOSE BLD STRIP.AUTO-MCNC: 153 MG/DL (ref 65–100)
GLUCOSE BLD STRIP.AUTO-MCNC: 153 MG/DL (ref 65–100)
GLUCOSE BLD STRIP.AUTO-MCNC: 237 MG/DL (ref 65–100)
GLUCOSE SERPL-MCNC: 108 MG/DL (ref 65–100)
HCO3 BLDA-SCNC: 30 MMOL/L (ref 22–26)
HCT VFR BLD AUTO: 41.5 % (ref 35–47)
HGB BLD-MCNC: 12.8 G/DL (ref 11.5–16)
MCH RBC QN AUTO: 29.2 PG (ref 26–34)
MCHC RBC AUTO-ENTMCNC: 30.8 G/DL (ref 30–36.5)
MCV RBC AUTO: 94.5 FL (ref 80–99)
PCO2 BLDA: 55 MMHG (ref 35–45)
PH BLDA: 7.35 [PH] (ref 7.35–7.45)
PLATELET # BLD AUTO: 211 K/UL (ref 150–400)
PO2 BLDA: 67 MMHG (ref 80–100)
POTASSIUM SERPL-SCNC: 4.3 MMOL/L (ref 3.5–5.1)
RBC # BLD AUTO: 4.39 M/UL (ref 3.8–5.2)
SAO2 % BLD: 92 % (ref 92–97)
SAO2% DEVICE SAO2% SENSOR NAME: ABNORMAL
SERVICE CMNT-IMP: ABNORMAL
SODIUM SERPL-SCNC: 136 MMOL/L (ref 136–145)
SPECIMEN SITE: ABNORMAL
WBC # BLD AUTO: 9.6 K/UL (ref 3.6–11)

## 2017-09-23 PROCEDURE — 85027 COMPLETE CBC AUTOMATED: CPT | Performed by: INTERNAL MEDICINE

## 2017-09-23 PROCEDURE — 80048 BASIC METABOLIC PNL TOTAL CA: CPT | Performed by: INTERNAL MEDICINE

## 2017-09-23 PROCEDURE — 74011250637 HC RX REV CODE- 250/637: Performed by: INTERNAL MEDICINE

## 2017-09-23 PROCEDURE — 74011250636 HC RX REV CODE- 250/636: Performed by: INTERNAL MEDICINE

## 2017-09-23 PROCEDURE — 94640 AIRWAY INHALATION TREATMENT: CPT

## 2017-09-23 PROCEDURE — 74011250636 HC RX REV CODE- 250/636: Performed by: EMERGENCY MEDICINE

## 2017-09-23 PROCEDURE — 74011636637 HC RX REV CODE- 636/637: Performed by: INTERNAL MEDICINE

## 2017-09-23 PROCEDURE — 82803 BLOOD GASES ANY COMBINATION: CPT | Performed by: INTERNAL MEDICINE

## 2017-09-23 PROCEDURE — 74011000258 HC RX REV CODE- 258: Performed by: INTERNAL MEDICINE

## 2017-09-23 PROCEDURE — A9270 NON-COVERED ITEM OR SERVICE: HCPCS | Performed by: INTERNAL MEDICINE

## 2017-09-23 PROCEDURE — 99218 HC RM OBSERVATION: CPT

## 2017-09-23 PROCEDURE — 36415 COLL VENOUS BLD VENIPUNCTURE: CPT | Performed by: INTERNAL MEDICINE

## 2017-09-23 PROCEDURE — 74011000250 HC RX REV CODE- 250: Performed by: INTERNAL MEDICINE

## 2017-09-23 PROCEDURE — 77030029684 HC NEB SM VOL KT MONA -A

## 2017-09-23 PROCEDURE — 82962 GLUCOSE BLOOD TEST: CPT

## 2017-09-23 PROCEDURE — 77010033678 HC OXYGEN DAILY

## 2017-09-23 RX ORDER — MICONAZOLE NITRATE 20 MG/G
CREAM TOPICAL EVERY 12 HOURS
Status: DISCONTINUED | OUTPATIENT
Start: 2017-09-23 | End: 2017-09-23 | Stop reason: RX

## 2017-09-23 RX ORDER — DOXYLAMINE SUCCINATE 25 MG
TABLET ORAL EVERY 12 HOURS
Status: DISCONTINUED | OUTPATIENT
Start: 2017-09-23 | End: 2017-09-27 | Stop reason: HOSPADM

## 2017-09-23 RX ORDER — CYCLOBENZAPRINE HCL 10 MG
5 TABLET ORAL
Status: DISCONTINUED | OUTPATIENT
Start: 2017-09-23 | End: 2017-09-27 | Stop reason: HOSPADM

## 2017-09-23 RX ADMIN — MICONAZOLE NITRATE: 20 CREAM TOPICAL at 14:14

## 2017-09-23 RX ADMIN — METOPROLOL TARTRATE 25 MG: 25 TABLET ORAL at 17:49

## 2017-09-23 RX ADMIN — LISINOPRIL 40 MG: 20 TABLET ORAL at 08:38

## 2017-09-23 RX ADMIN — ALBUTEROL SULFATE 2.5 MG: 2.5 SOLUTION RESPIRATORY (INHALATION) at 21:53

## 2017-09-23 RX ADMIN — INSULIN LISPRO 2 UNITS: 100 INJECTION, SOLUTION INTRAVENOUS; SUBCUTANEOUS at 12:04

## 2017-09-23 RX ADMIN — ASPIRIN 81 MG: 81 TABLET, COATED ORAL at 08:38

## 2017-09-23 RX ADMIN — AZITHROMYCIN MONOHYDRATE 500 MG: 500 INJECTION, POWDER, LYOPHILIZED, FOR SOLUTION INTRAVENOUS at 21:03

## 2017-09-23 RX ADMIN — Medication 10 ML: at 14:15

## 2017-09-23 RX ADMIN — CEFTRIAXONE 1 G: 1 INJECTION, POWDER, FOR SOLUTION INTRAMUSCULAR; INTRAVENOUS at 12:05

## 2017-09-23 RX ADMIN — INSULIN LISPRO 2 UNITS: 100 INJECTION, SOLUTION INTRAVENOUS; SUBCUTANEOUS at 17:48

## 2017-09-23 RX ADMIN — ALBUTEROL SULFATE 2.5 MG: 2.5 SOLUTION RESPIRATORY (INHALATION) at 03:04

## 2017-09-23 RX ADMIN — CYCLOBENZAPRINE HYDROCHLORIDE 5 MG: 10 TABLET, FILM COATED ORAL at 22:07

## 2017-09-23 RX ADMIN — INSULIN LISPRO 2 UNITS: 100 INJECTION, SOLUTION INTRAVENOUS; SUBCUTANEOUS at 22:07

## 2017-09-23 RX ADMIN — Medication 10 ML: at 21:04

## 2017-09-23 RX ADMIN — Medication: at 12:02

## 2017-09-23 RX ADMIN — Medication 10 ML: at 05:58

## 2017-09-23 RX ADMIN — INSULIN LISPRO 2 UNITS: 100 INJECTION, SOLUTION INTRAVENOUS; SUBCUTANEOUS at 07:30

## 2017-09-23 RX ADMIN — PREDNISONE 40 MG: 20 TABLET ORAL at 08:38

## 2017-09-23 RX ADMIN — METOPROLOL TARTRATE 25 MG: 25 TABLET ORAL at 08:38

## 2017-09-23 RX ADMIN — MICONAZOLE NITRATE: 20 CREAM TOPICAL at 21:00

## 2017-09-23 RX ADMIN — ACETAMINOPHEN 650 MG: 325 TABLET ORAL at 08:38

## 2017-09-23 RX ADMIN — ALBUTEROL SULFATE 2.5 MG: 2.5 SOLUTION RESPIRATORY (INHALATION) at 11:40

## 2017-09-23 RX ADMIN — FUROSEMIDE 40 MG: 40 TABLET ORAL at 08:38

## 2017-09-23 RX ADMIN — FLUTICASONE PROPIONATE 2 SPRAY: 50 SPRAY, METERED NASAL at 10:21

## 2017-09-23 RX ADMIN — PRAVASTATIN SODIUM 20 MG: 40 TABLET ORAL at 22:07

## 2017-09-23 RX ADMIN — ENOXAPARIN SODIUM 40 MG: 40 INJECTION SUBCUTANEOUS at 08:39

## 2017-09-23 NOTE — CARDIO/PULMONARY
Cardiopulmonary Rehab:      Chart reviewed. Pt is a 41 y.o. female admitted with Community acquired pneumonia     PMH includes CAD, HTN, diastolic CHF, DM, NSTEMI, COPD, and smoker. Last visited for HF teaching 5/26/15. Pt visited for CHF teaching. Pt bathing. Nursing staff in room. Left CHF packet including dietary information at the bedside. Will continue to follow as appropriate.

## 2017-09-23 NOTE — PROGRESS NOTES
TRANSFER - IN REPORT:    Verbal report received from [facility] (name) on Zen Quiroga  being received from ED (unit) for   Report consisted of patients Situation, Background, Assessment and   Recommendations(SBAR). Information from the following report(s) SBAR, Kardex and Recent Results was reviewed with the receiving nurse. Opportunity for questions and clarification was provided. Assessment completed upon patients arrival to unit and care assumed.        Primary Nurse Darnell Vazquez RN and Endy Mcclellan RN performed a dual skin assessment on this patient Impairment noted- see wound doc flow sheet  Jeffrey score is 18

## 2017-09-23 NOTE — PROGRESS NOTES
[address] SHIFT NURSING NOTE    Bedside and Verbal shift change report given to [facility] RN and Yunier Espinoza RN (oncoming nurse) by Josiah Kendrick (offgoing nurse). Report included the following information SBAR, Kardex and Recent Results. SHIFT SUMMARY:  Wound to left leg wrapped with guaze and dressing. Wound care consult. Admission Date 9/22/2017   Admission Diagnosis SOB (shortness of breath)   Consults IP CONSULT TO HOSPITALIST        Consults   [x] PT   [] OT   [] Speech   [] Palliative      [] Hospice    [] Case Management   [x] None   Cardiac Monitoring   [x] Yes   [] No     Antibiotics   [x] Yes   [] No   GI Prophylaxis  (Ex: Protonix, Pepcid, etc,.)   [] Yes   [x] No          DVT Prophylaxis   SCDs:             Haroon stockings:       refused  [x] Medication (Ex: Lovenox, Eliquis, Brilinta, Coumadin,  Heparin, etc..)   [] Contraindicated   [] No VTE needed       Urinary Catheter             LDAs               Peripheral IV 09/22/17 Right Antecubital (Active)   Site Assessment Clean, dry, & intact 9/23/2017  1:07 AM   Phlebitis Assessment 0 9/23/2017  1:07 AM   Infiltration Assessment 0 9/23/2017  1:07 AM   Dressing Status Clean, dry, & intact 9/23/2017  1:07 AM   Dressing Type Transparent 9/23/2017  1:07 AM   Hub Color/Line Status Pink;Patent 9/23/2017  1:07 AM                      I/Os   Intake/Output Summary (Last 24 hours) at 09/23/17 0659  Last data filed at 09/23/17 0107   Gross per 24 hour   Intake              250 ml   Output                0 ml   Net              250 ml         Activity Level Activity Level: Up with Assistance     Activity Assistance: Partial (one person)   Diet Active Orders   Diet    DIET REGULAR      Purposeful Rounding every 1-2 hour?    [x] Yes    Yu Score  Total Score: 3   Bed Alarm (If score 3 or >)   [] Yes    [] Refused (See signed refusal form in chart)   Jeffrey Score  Jeffery Score: 18       Jeffrey Score (if score 14 or less)   [] PMT consult   [] Nutrition consult   [x] Wound Care consult      []  Specialty bed         Influenza Vaccine Received Flu Vaccine for Current Season (usually Sept-March): No    Patient/Guardian Refused (Notify MD): Yes          Needs prior to discharge:   Home O2 required:    [x] Yes   [] No     If yes, how much O2 required? Other:    Last Bowel Movement Date: 09/23/17   Readmission Risk Assessment Tool Score High Risk            22       Total Score        3 Has Seen PCP in Last 6 Months (Yes=3, No=0)    4 IP Visits Last 12 Months (1-3=4, 4=9, >4=11)    9 Pt. Coverage (Medicare=5 , Medicaid, or Self-Pay=4)    6 Charlson Comorbidity Score (Age + Comorbid Conditions)        Criteria that do not apply:    . Living with Significant Other. Assisted Living. LTAC. SNF.  or   Rehab    Patient Length of Stay (>5 days = 3)       Expected Length of Stay - - -   Actual Length of Stay 0

## 2017-09-23 NOTE — H&P
Hospitalist Admission Note    NAME: Elzbieta Woody   :  1970   MRN:  [id number]     Date/Time:  2017 10:33 PM    Patient PCP: Robina Raza MD  ________________________________________________________________________    My assessment of this patient's clinical condition and my plan of care is as follows. Assessment / Plan:  Community acquired pneumonia  -admit to observation  -multiple opacities on CT chest, no increase in home O2 requirement, no significant tachypnea  -start ceftriaxone and azithromycin, likely can switch to PO soon  -nebs    Chronic hypoxic and hypercapneic respiratory failure in setting of obesity hypoventilation syndrome  -at baseline O2 of 5-6 L  -has not had cpap for few months, it was taken away by her insurance company, she apparently had an appointment with Dr. Derick Rodriguez sleep clinic but she is unable to get proper transport to clinic, may benefit from triology machine, would need order from pulmonary before d/c    Chronic LE lymphedema and venous stasis    CAD  HTN  Chronic diastolic HF  -appears compensated, continue lasix PO  -metoprolol, lisinopril    DM  -SSI, hold home PO meds    Obesity  Body mass index is 71.82 kg/(m^2). Code Status: full  Surrogate Decision Maker: son    DVT Prophylaxis: lovenox  GI Prophylaxis: not indicated    Baseline: seems relatively home bound due to comorbid conditions        Subjective:   CHIEF COMPLAINT: SOB    HISTORY OF PRESENT ILLNESS:     Yoel Estrada is a 41 y.o.  female with history of morbid obesity OHS on 5 L O2 at baseline, COPD, CHF who presents with shortness of breath. Patient felt she could not tolerate her concentrator at home she felt like it was too strong and causing her SOB so she turned it down to 3 L. Has had chronic cough, no fevers or chills.  Also has not had CPAP for months it was broken and her insurance company took it away, she has not been able to arrange going to a clinic because she would need a lot of O2 tanks to go. We were asked to admit for work up and evaluation of the above problems. Past Medical History:   Diagnosis Date    Arthritis     Asthma     CAD (coronary artery disease)     Congestive heart failure (HCC)     [facility]     Diabetes ([facility]     Hypertension     Morbid obesity with BMI of 70 and over, adult ([facility]     NSTEMI (non-ST elevated myocardial infarction) ([facility]     SVT (supraventricular tachycardia) ([facility]         Past Surgical History:   Procedure Laterality Date    CARDIAC SURG PROCEDURE UNLIST      Stents    HX  SECTION      HX ORTHOPAEDIC      HX OTHER SURGICAL      cyst removed from back       Social History   Substance Use Topics    Smoking status: Current Every Day Smoker     Packs/day: 0.25     Types: Cigarettes    Smokeless tobacco: Never Used    Alcohol use No        Family History   Problem Relation Age of Onset    Heart Disease Mother     Heart Disease Father     Heart Disease Sister      No Known Allergies     Prior to Admission medications    Medication Sig Start Date End Date Taking? Authorizing Provider   fluticasone (FLONASE) 50 mcg/actuation nasal spray 2 Sprays by Both Nostrils route daily. 17   Nesha Gallardo MD   nystatin (MYCOSTATIN) topical cream Apply  to affected area two (2) times a day. 17   Nesha Gallardo MD   HYDROcodone-acetaminophen (NORCO) 5-325 mg per tablet Take 1 Tab by mouth every four (4) hours as needed for Pain. Max Daily Amount: 6 Tabs. 17   Harpreet Hester MD   l.acidoph-B.lactis-B.longum [employer]) 460 mg (7.5-6- 1.5 bill. cell) cap cap Take 1 Cap by mouth Daily (before breakfast). 17   Harpreet Hester MD   albuterol (PROVENTIL VENTOLIN) 2.5 mg /3 mL (0.083 %) nebulizer solution 3 mL by Nebulization route every four (4) hours as needed for Wheezing.  17 MARI Garner MD   furosemide (LASIX) 40 mg tablet Take 40 mg by mouth daily. Landon Raza MD   lovastatin (MEVACOR) 40 mg tablet Take 1 Tab by mouth nightly. 1/14/16   Triston Plasencia NP   clotrimazole (LOTRIMIN) 1 % topical cream Apply to the affected areas twice daily until healed 1/13/16   Jarrod Garnett MD   lisinopril (PRINIVIL, ZESTRIL) 40 mg tablet Take 40 mg by mouth daily. Historical Provider   glyBURIDE (DIABETA) 5 mg tablet Take 5 mg by mouth Daily (before breakfast). Historical Provider   cetirizine (ZYRTEC) 10 mg tablet Take 10 mg by mouth daily as needed for Allergies. Historical Provider   medroxyPROGESTERone (DEPO-PROVERA) 150 mg/mL injection 150 mg. 5/1/15   Landon Raza MD   omeprazole (PRILOSEC) 40 mg capsule Take 40 mg by mouth daily. 7/18/15   Landon Raza MD   ketoconazole (NIZORAL) 2 % topical cream Apply  to affected area daily. Historical Provider   ammonium lactate (LAC-HYDRIN) 12 % topical cream rub in to affected area well 11/21/14   Tyesha Wallace MD   aspirin delayed-release 81 mg tablet Take 81 mg by mouth daily. Historical Provider   metoprolol (LOPRESSOR) 25 mg tablet Take 25 mg by mouth two (2) times a day. Historical Provider   nitroglycerin (NITROSTAT) 0.4 mg SL tablet 1 Tab by SubLINGual route every five (5) minutes as needed for Chest Pain (call 911 if not relieved by 3). 9/12/13   Triston Plasencia NP   albuterol (PROVENTIL, VENTOLIN) 90 mcg/actuation inhaler Take 1-2 Puffs by inhalation every four (4) hours as needed for Wheezing.  9/10/13   Gabriela Henry MD       REVIEW OF SYSTEMS:         Total of 12 systems reviewed as follows:         General: no fever, no chills, no sweats, + generalized weakness, no weight loss, no weight gain, no loss of appetite   Eyes: no blurred vision, no eye pain, no loss of vision, no double vision  ENT: no rhinorrhea, no pharyngitis   Respiratory: + cough, no sputum production,+SOB, + RAZO, no wheezing, no pleuritic pain   Cardiology: no chest pain, no palpitations, no orthopnea, no PND, + edema, no syncope   Gastrointestinal: no abdominal pain, no N/V, no diarrhea, no dysphagia, no constipation, no bleeding   Genitourinary: no frequency, no urgency, no dysuria, no hematuria, no incontinence   Muskuloskeletal : no arthralgia, no myalgia, no back pain  Hematology: no easy bruising, no nose or gum bleeding, no lymphadenopathy   Dermatological: + rash, no ulceration, no pruritis, no color change / jaundice  Endocrine: no hot flashes, no polydipsia   Neurological: no headache, no dizziness, no confusion, no focal weakness, no paresthesia, no speech difficulties, no memory loss, no gait difficulty  Psychological: no anxiety, no depression, no agitation      Objective:   VITALS:    Patient Vitals for the past 12 hrs:   Temp Pulse Resp BP SpO2   09/22/17 1800 - 98 19 - 98 %   09/22/17 1730 - (!) 110 18 - 93 %   09/22/17 1700 - 98 17 - 95 %   09/22/17 1630 - 98 21 - 97 %   09/22/17 1605 - 96 20 - 100 %   09/22/17 1530 - 97 18 - 98 %   09/22/17 1529 - 99 13 133/78 96 %   09/22/17 1500 - 96 22 - 97 %   09/22/17 1401 98.6 °F (37 °C) 99 20 (!) 122/94 95 %         PHYSICAL EXAM:    General:    Alert, cooperative, morbidely obese no distress  HEENT: Atraumatic, anicteric sclerae, pink conjunctivae     No oral ulcers, oral mucosa moist, throat clear, dentition fair  Neck:  Supple, symmetrical  Lungs:   Clear to auscultation bilaterally, no wheezing, rhonchi, or rales. Chest wall:  No tenderness, no accessory muscle use. Heart:   Regular rhythm, no murmur, no edema  Abdomen:   Soft, non-tender, obese, non distended, bowel sounds normal  Extremities: No cyanosis, no clubbing, warm, well perfused, radial pulse 2+  Skin:     Chronic changes in LE, hyperpigmentation and plaques, Not pale, not jaundiced, no rashes   Psych:  Fair insight, not depressed, not anxious or agitated.   Neurologic: EOMs intact, face symmetric, no aphasia or slurred speech, strength 5/5 in BUE, 5/5 in BLE, sensation grossly intact, alert and oriented x 4.     _______________________________________________________________________  Care Plan discussed with:    Comments   Patient x    Family      RN     Care Manager                    Consultant:      _______________________________________________________________________  Expected  Disposition:   Home with Family    HH/PT/OT/RN x   SNF/LTC    LUIS A    ________________________________________________________________________  TOTAL TIME:  61 Minutes    Critical Care Provided     Minutes non procedure based      Comments     Reviewed previous records   >50% of visit spent in counseling and coordination of care  Discussion with patient and/or family and questions answered       ________________________________________________________________________  Gisela Hankins MD    Procedures: see electronic medical records for all procedures/Xrays and details which were not copied into this note but were reviewed prior to creation of Plan.     LAB DATA REVIEWED:    Recent Results (from the past 24 hour(s))   URINALYSIS W/ RFLX MICROSCOPIC    Collection Time: 09/22/17  3:55 PM   Result Value Ref Range    Color YELLOW/STRAW      Appearance CLOUDY (A) CLEAR      Specific gravity 1.016 1.003 - 1.030      pH (UA) 8.0 5.0 - 8.0      Protein NEGATIVE  NEG mg/dL    Glucose NEGATIVE  NEG mg/dL    Ketone NEGATIVE  NEG mg/dL    Bilirubin NEGATIVE  NEG      Blood NEGATIVE  NEG      Urobilinogen 2.0 (H) 0.2 - 1.0 EU/dL    Nitrites NEGATIVE  NEG      Leukocyte Esterase NEGATIVE  NEG     METABOLIC PANEL, COMPREHENSIVE    Collection Time: 09/22/17  3:56 PM   Result Value Ref Range    Sodium 136 136 - 145 mmol/L    Potassium 4.7 3.5 - 5.1 mmol/L    Chloride 102 97 - 108 mmol/L    CO2 31 21 - 32 mmol/L    Anion gap 3 (L) 5 - 15 mmol/L    Glucose 78 65 - 100 mg/dL    BUN 11 6 - 20 MG/DL    Creatinine 0.63 0.55 - 1.02 MG/DL    BUN/Creatinine ratio 17 12 - 20      GFR est AA >60 >60 ml/min/1.73m2    GFR est non-AA >60 >60 ml/min/1.73m2 Calcium 10.3 (H) 8.5 - 10.1 MG/DL    Bilirubin, total 0.6 0.2 - 1.0 MG/DL    ALT (SGPT) 19 12 - 78 U/L    AST (SGOT) 24 15 - 37 U/L    Alk.  phosphatase 100 45 - 117 U/L    Protein, total 8.2 6.4 - 8.2 g/dL    Albumin 3.1 (L) 3.5 - 5.0 g/dL    Globulin 5.1 (H) 2.0 - 4.0 g/dL    A-G Ratio 0.6 (L) 1.1 - 2.2     NT-PRO BNP    Collection Time: 09/22/17  3:56 PM   Result Value Ref Range    NT pro- (H) 0 - 125 PG/ML   TROPONIN I    Collection Time: 09/22/17  3:56 PM   Result Value Ref Range    Troponin-I, Qt. <0.04 <0.05 ng/mL   MAGNESIUM    Collection Time: 09/22/17  3:56 PM   Result Value Ref Range    Magnesium 2.1 1.6 - 2.4 mg/dL   HCG URINE, QL    Collection Time: 09/22/17  4:03 PM   Result Value Ref Range    HCG urine, Ql. NEGATIVE  NEG     D DIMER    Collection Time: 09/22/17  4:08 PM   Result Value Ref Range    D-dimer 0.90 (H) 0.00 - 0.65 mg/L FEU

## 2017-09-23 NOTE — ED PROVIDER NOTES
HPI Comments: Chris Mae is a 41 y.o. female with PMhx significant for CAD, COPD, and CHF who presents via EMS to the ED with cc of shortness of breath which began 1 week ago. She describes the sensation as \"not being able to get enough air\". Pt states that she is typically on 5L oxygen at home and had to reduce it to 3L. She states that her left side is throbbing and swollen. She had chest pain earlier and states that \"it hurts to breath\". Pt has a wound to her left ankle which was last dressed on . Pt is able to ambulate with a walker. Pt denies a history of PE, kidney problems, taking blood thinners, or rashes. Social Hx: + (0.25 ppd) Tobacco, - EtOH, - Illicit Drugs    PCP: Landon Raza MD    There are no other complaints, changes or physical findings at this time. The history is provided by the patient. No  was used. Past Medical History:   Diagnosis Date    Arthritis     Asthma     CAD (coronary artery disease)     Congestive heart failure (HCC)     COPD (chronic obstructive pulmonary disease) ([address]     Diabetes [facility]     Hypertension     Morbid obesity with BMI of 70 and over, adult ([facility]     NSTEMI (non-ST elevated myocardial infarction) ([address]     SVT (supraventricular tachycardia) ([facility]      Past Surgical History:   Procedure Laterality Date    CARDIAC SURG PROCEDURE UNLIST      Stents    HX  SECTION      HX ORTHOPAEDIC      HX OTHER SURGICAL      cyst removed from back     Family History:   Problem Relation Age of Onset    Heart Disease Mother     Heart Disease Father     Heart Disease Sister      Social History     Social History    Marital status: SINGLE     Spouse name: N/A    Number of children: N/A    Years of education: N/A     Occupational History    Not on file.      Social History Main Topics    Smoking status: Current Every Day Smoker     Packs/day: 0.25     Types: Cigarettes    Smokeless tobacco: Never Used    Alcohol use No  Drug use: No    Sexual activity: Not on file     Other Topics Concern    Not on file     Social History Narrative    ** Merged History Encounter **          ALLERGIES: Review of patient's allergies indicates no known allergies. Review of Systems   Constitutional: Negative for chills and fever. HENT: Negative for congestion and sore throat. Eyes: Negative for visual disturbance. Respiratory: Positive for cough and shortness of breath. Cardiovascular: Positive for chest pain. Negative for leg swelling. Gastrointestinal: Negative for abdominal pain, blood in stool, diarrhea and nausea. Endocrine: Negative for polyuria. Genitourinary: Negative for dysuria, flank pain, vaginal bleeding and vaginal discharge. Musculoskeletal: Negative for myalgias. Skin: Positive for wound (left ankle). Negative for rash. Allergic/Immunologic: Negative for immunocompromised state. Neurological: Negative for weakness and headaches. Psychiatric/Behavioral: Negative for dysphoric mood. Vitals:    09/22/17 2330 09/22/17 2340 09/23/17 0018 09/23/17 0046   BP:  136/81  142/52   Pulse: 98 (!) 111 98 (!) 107   Resp: 19 15  20   Temp:    98.2 °F (36.8 °C)   SpO2: 95% 94% 94% 94%   Weight:              Physical Exam   Constitutional: She is oriented to person, place, and time. She appears well-developed and well-nourished. Obese female in no acute distress   HENT:   Head: Normocephalic and atraumatic. Moist mucous membranes   Eyes: Conjunctivae are normal. Pupils are equal, round, and reactive to light. Right eye exhibits no discharge. Left eye exhibits no discharge. Neck: Normal range of motion. Neck supple. No tracheal deviation present. Cardiovascular: Normal rate, regular rhythm and normal heart sounds. No murmur heard. Pulmonary/Chest: Effort normal. No respiratory distress. She has decreased breath sounds. She has no wheezes. She has no rales.    Decreased breath sounds in bilateral bases Abdominal: Soft. Bowel sounds are normal. There is no tenderness. There is no rebound and no guarding. Obese abdomen   Musculoskeletal: Normal range of motion. She exhibits no edema, tenderness or deformity. Neurological: She is alert and oriented to person, place, and time. Skin: Skin is warm and dry. Wound to left posterior calf, draining foul smelling discharge  Chronic venous statis changes, woody changes to skin   Psychiatric: Her behavior is normal.   Nursing note and vitals reviewed. MDM  Number of Diagnoses or Management Options  Pneumonia of both lungs due to infectious organism, unspecified part of lung:   Diagnosis management comments: DDx: Pneumonia, PE, acute on chronic CHF, Pneumothorax, ACS       Amount and/or Complexity of Data Reviewed  Clinical lab tests: ordered and reviewed  Tests in the radiology section of CPT®: ordered and reviewed  Review and summarize past medical records: yes  Discuss the patient with other providers: yes (Hospitalist )    Patient Progress  Patient progress: stable    ED Course     Procedures    CONSULT NOTE:  8:19 PM  Julio Colindres DO spoke with Dr. Shakira Prince,  Specialty: Hospitalist  Discussed pt's hx, disposition, and available diagnostic and imaging results. Reviewed care plans. Consultant recommends admitting the pt.      LABORATORY TESTS:  Recent Results (from the past 12 hour(s))   URINALYSIS W/ RFLX MICROSCOPIC    Collection Time: 09/22/17  3:55 PM   Result Value Ref Range    Color YELLOW/STRAW      Appearance CLOUDY (A) CLEAR      Specific gravity 1.016 1.003 - 1.030      pH (UA) 8.0 5.0 - 8.0      Protein NEGATIVE  NEG mg/dL    Glucose NEGATIVE  NEG mg/dL    Ketone NEGATIVE  NEG mg/dL    Bilirubin NEGATIVE  NEG      Blood NEGATIVE  NEG      Urobilinogen 2.0 (H) 0.2 - 1.0 EU/dL    Nitrites NEGATIVE  NEG      Leukocyte Esterase NEGATIVE  NEG     METABOLIC PANEL, COMPREHENSIVE    Collection Time: 09/22/17  3:56 PM   Result Value Ref Range    Sodium 136 136 - 145 mmol/L    Potassium 4.7 3.5 - 5.1 mmol/L    Chloride 102 97 - 108 mmol/L    CO2 31 21 - 32 mmol/L    Anion gap 3 (L) 5 - 15 mmol/L    Glucose 78 65 - 100 mg/dL    BUN 11 6 - 20 MG/DL    Creatinine 0.63 0.55 - 1.02 MG/DL    BUN/Creatinine ratio 17 12 - 20      GFR est AA >60 >60 ml/min/1.73m2    GFR est non-AA >60 >60 ml/min/1.73m2    Calcium 10.3 (H) 8.5 - 10.1 MG/DL    Bilirubin, total 0.6 0.2 - 1.0 MG/DL    ALT (SGPT) 19 12 - 78 U/L    AST (SGOT) 24 15 - 37 U/L    Alk. phosphatase 100 45 - 117 U/L    Protein, total 8.2 6.4 - 8.2 g/dL    Albumin 3.1 (L) 3.5 - 5.0 g/dL    Globulin 5.1 (H) 2.0 - 4.0 g/dL    A-G Ratio 0.6 (L) 1.1 - 2.2     NT-PRO BNP    Collection Time: 09/22/17  3:56 PM   Result Value Ref Range    NT pro- (H) 0 - 125 PG/ML   TROPONIN I    Collection Time: 09/22/17  3:56 PM   Result Value Ref Range    Troponin-I, Qt. <0.04 <0.05 ng/mL   MAGNESIUM    Collection Time: 09/22/17  3:56 PM   Result Value Ref Range    Magnesium 2.1 1.6 - 2.4 mg/dL   HCG URINE, QL    Collection Time: 09/22/17  4:03 PM   Result Value Ref Range    HCG urine, Ql. NEGATIVE  NEG     D DIMER    Collection Time: 09/22/17  4:08 PM   Result Value Ref Range    D-dimer 0.90 (H) 0.00 - 0.65 mg/L FEU       IMAGING RESULTS:  CT Results  (Last 48 hours)               09/22/17 1933  CTA CHEST W OR W WO CONT Final result    Impression:  IMPRESSION:        No evidence for pulmonary embolism   Multifocal patchy airspace disease, suggestive of multifocal pneumonia                   Narrative:  EXAM:  CTA CHEST W OR W WO CONT       INDICATION:   Chest pain and cough during pregnancy. Acute pulmonary embolism   (PE) suspected       COMPARISON: 8/18/2016. CONTRAST:  100 mL of Isovue-370. TECHNIQUE:    Precontrast  images were obtained to localize the volume for acquisition.    Multislice helical CT arteriography was performed from the diaphragm to the   thoracic inlet during uneventful rapid bolus intravenous contrast administration. Lung and soft tissue windows were generated. Coronal and   sagittal images were generated and 3D post processing consisting of coronal   maximum intensity images was performed. CT dose reduction was achieved through   use of a standardized protocol tailored for this examination and automatic   exposure control for dose modulation. FINDINGS:   Few patchy subcentimeter groundglass in the left upper lobe (image 68) and right   middle lobe (image 61). As well as the right lower lobe (image 38). The pulmonary arteries are well enhanced and no pulmonary emboli are identified. There is no mediastinal or hilar adenopathy or mass. The aorta enhances normally   without evidence of aneurysm or dissection. The visualized portions of the upper abdominal organs are normal.               CXR Results  (Last 48 hours)               09/22/17 1438  XR CHEST PA LAT Final result    Impression:  IMPRESSION: Central congestion. Lung hyperinflation. No infiltrate. Narrative:  EXAM:  XR CHEST PA LAT       INDICATION:   Increased shortness of breath. Patient prescribed home oxygen. COMPARISON: 9/8/2017. FINDINGS: PA and lateral radiographs of the chest demonstrate central congestion   and coarsening of the lung markings in both lower lobes. There is no infiltrate   or pleural effusion. The royal are borderline in size. Lungs are hyperinflated.                MEDICATIONS GIVEN:  Medications   azithromycin (ZITHROMAX) 500 mg in 0.9% sodium chloride (MBP/ADV) 250 mL (500 mg IntraVENous New Bag 9/22/17 2497)   cefTRIAXone (ROCEPHIN) 1 g in 0.9% sodium chloride (MBP/ADV) 50 mL (not administered)   albuterol (PROVENTIL VENTOLIN) nebulizer solution 2.5 mg (not administered)   aspirin delayed-release tablet 81 mg (not administered)   fluticasone (FLONASE) 50 mcg/actuation nasal spray 2 Spray (not administered)   furosemide (LASIX) tablet 40 mg (not administered)   lisinopril (PRINIVIL, ZESTRIL) tablet 40 mg (not administered)   pravastatin (PRAVACHOL) tablet 20 mg (20 mg Oral Given 9/22/17 2322)   metoprolol tartrate (LOPRESSOR) tablet 25 mg (not administered)   predniSONE (DELTASONE) tablet 40 mg (not administered)   sodium chloride (NS) flush 5-10 mL (5 mL IntraVENous Missed 9/22/17 2319)   sodium chloride (NS) flush 5-10 mL (not administered)   acetaminophen (TYLENOL) tablet 650 mg (not administered)   ondansetron (ZOFRAN) injection 4 mg (not administered)   docusate sodium (COLACE) capsule 100 mg (not administered)   enoxaparin (LOVENOX) injection 40 mg (not administered)   insulin lispro (HUMALOG) injection (not administered)   0.9% sodium chloride infusion (50 mL/hr IntraVENous New Bag 9/22/17 1935)   iopamidol (ISOVUE-370) 76 % injection 100 mL (100 mL IntraVENous Given 9/22/17 1934)   sodium chloride (NS) flush 10 mL (10 mL IntraVENous Given 9/22/17 1935)   cefTRIAXone (ROCEPHIN) 1 g in 0.9% sodium chloride (MBP/ADV) 50 mL (1 g IntraVENous New Bag 9/22/17 2015)       IMPRESSION:  1. Pneumonia of both lungs due to infectious organism, unspecified part of lung        PLAN: Admit to Hospital    Admit Note:  8:58 PM  Patient is being admitted to the hospital by Dr. Deangelo Clemons. The results of their tests and reasons for their admission have been discussed with them and/or available family. They convey agreement and understanding for the need to be admitted and for their admission diagnosis. Consultation has been made with the inpatient physician specialist for hospitalization. Attestation: This note is prepared by Horacio Mosquera, acting as Scribe for DO Max Person DO: The scribe's documentation has been prepared under my direction and personally reviewed by me in its entirety. I confirm that the note above accurately reflects all work, treatment, procedures, and medical decision making performed by me.

## 2017-09-23 NOTE — PROGRESS NOTES
[address] SHIFT NURSING NOTE    Bedside shift change report given to NORMA Linn (oncoming nurse) by Rola Paredes (offgoing nurse). Report included the following information SBAR, Kardex, Intake/Output, MAR and Recent Results. SHIFT SUMMARY:     1482: Nurse noted redness under patient's breast and skin folds; Patient seen by Dr. Raimundo Cortes with orders received for skin folds and under breast    1300: Nurse washed patient up and applied antifungal cream under breast and skin barrier under folds    1600: Nurse communicated with RT regarding CPAP for patient; RT stated they require patient's settings to be able to set-up CPAP. Therefore pulmonologist consult is required.            Admission Date 9/22/2017   Admission Diagnosis SOB (shortness of breath)   Consults IP CONSULT TO HOSPITALIST        Consults   [] PT   [] OT   [] Speech   [] Palliative      [] Hospice    [x] Case Management   [] None   Cardiac Monitoring   [x] Yes   [] No     Antibiotics   [x] Yes   [] No   GI Prophylaxis  (Ex: Protonix, Pepcid, etc,.)   [x] Yes   [] No          DVT Prophylaxis   SCDs:             Haroon stockings:         [x] Medication (Ex: Lovenox, Eliquis, Brilinta, Coumadin,  Heparin, etc..)   [] Contraindicated   [] No VTE needed       Urinary Catheter             LDAs               Peripheral IV 09/22/17 Right Antecubital (Active)   Site Assessment Clean, dry, & intact 9/23/2017  7:58 AM   Phlebitis Assessment 0 9/23/2017  7:58 AM   Infiltration Assessment 0 9/23/2017  7:58 AM   Dressing Status Clean, dry, & intact 9/23/2017  7:58 AM   Dressing Type Tape;Transparent 9/23/2017  7:58 AM   Hub Color/Line Status Pink;Patent 9/23/2017  7:58 AM                      I/Os   Intake/Output Summary (Last 24 hours) at 09/23/17 1323  Last data filed at 09/23/17 1128   Gross per 24 hour   Intake              490 ml   Output              400 ml   Net               90 ml         Activity Level Activity Level: Up ad tiffani     Activity Assistance: Partial (one person)   Diet Active Orders   Diet    DIET REGULAR      Purposeful Rounding every 1-2 hour? [x] Yes    Yu Score  Total Score: 3   Bed Alarm (If score 3 or >)   [] Yes    [] Refused (See signed refusal form in chart)   Jeffrey Score  Jeffrey Score: 18       Jeffrey Score (if score 14 or less)   [] PMT consult   [] Nutrition consult   [x] Wound Care consult      [x]  Specialty bed         Influenza Vaccine Received Flu Vaccine for Current Season (usually Sept-March): No    Patient/Guardian Refused (Notify MD): Yes          Needs prior to discharge:   Home O2 required:    [x] Yes   [] No     If yes, how much O2 required? Other:    Last Bowel Movement Date: 09/23/17   Readmission Risk Assessment Tool Score High Risk            22       Total Score        3 Has Seen PCP in Last 6 Months (Yes=3, No=0)    4 IP Visits Last 12 Months (1-3=4, 4=9, >4=11)    9 Pt. Coverage (Medicare=5 , Medicaid, or Self-Pay=4)    6 Charlson Comorbidity Score (Age + Comorbid Conditions)        Criteria that do not apply:    . Living with Significant Other. Assisted Living. LTAC. SNF.  or   Rehab    Patient Length of Stay (>5 days = 3)       Expected Length of Stay - - -   Actual Length of Stay 0

## 2017-09-23 NOTE — ED NOTES
TRANSFER - OUT REPORT:    Verbal report given to Akil Engle RN on Avi Sawyer  being transferred to [facility] for routine progression of care       Report consisted of patients Situation, Background, Assessment and   Recommendations(SBAR). Information from the following report(s) SBAR, Kardex, ED Summary, [place] and Recent Results was reviewed with the receiving nurse. Lines:   Peripheral IV 09/22/17 Right Antecubital (Active)   Site Assessment Clean, dry, & intact 9/22/2017  3:57 PM   Phlebitis Assessment 0 9/22/2017  3:57 PM   Infiltration Assessment 0 9/22/2017  3:57 PM   Dressing Status Clean, dry, & intact 9/22/2017  3:57 PM   Dressing Type Transparent 9/22/2017  3:57 PM   Hub Color/Line Status Flushed 9/22/2017  3:57 PM        Opportunity for questions and clarification was provided.       Patient transported with:   [employer]

## 2017-09-23 NOTE — ED NOTES
Pt assisted to [facility] x 2 standby assist. Pt voided. Pt back to chair; on cardiac monitor and continuous pulse ox. Pt continues to refuse having bp cuff on. Pt requesting meal tray; with check with Dr. Milton Villarreal.

## 2017-09-23 NOTE — PROGRESS NOTES
Hospitalist Progress Note    NAME: Teresa Arora   :  1970   MRN:  [id number]       Interim Hospital Summary: 41 y.o. female whom presented on 2017 with      Assessment / Plan:  Community acquired pneumonia  -admit to observation  -multiple opacities on CT chest, no pe-  -ceftriaxone and azithromycin, witch to PO in am  -nebs prn     Chronic hypoxic and hypercapneic respiratory failure in setting of obesity hypoventilation syndrome  -at baseline O2 of 5-6 L  -has not had cpap for few months, have asked nurse to call respirator to assist with cpap  steroids for now   CAD  HTN  Chronic diastolic HF  -appears compensated, continue lasix PO  -metoprolol, lisinopril   Chronic LE lymphedema / venous stasis POA will ask wound care to see pt-  Lasix   DM  -SSI, hold home PO meds   rt breast candida nystatin   Obesity  Body mass index is 71.82 kg/(m^2).     Code Status: full  Surrogate Decision Maker: son     DVT Prophylaxis: lovenox  GI Prophylaxis: not indicated     Baseline: seems relatively home bound due to comorbid conditions         Subjective:     Chief Complaint / Reason for Physician Visit feels well no c/o wants to eat more   Discussed with RN events overnight. Review of Systems:  Symptom Y/N Comments  Symptom Y/N Comments   Fever/Chills    Chest Pain     Poor Appetite    Edema     Cough    Abdominal Pain     Sputum    Joint Pain     SOB/RAZO    Pruritis/Rash     Nausea/vomit    Tolerating PT/OT     Diarrhea    Tolerating Diet     Constipation    Other       Could NOT obtain due to:      Objective:     VITALS:   Last 24hrs VS reviewed since prior progress note.  Most recent are:  Patient Vitals for the past 24 hrs:   Temp Pulse Resp BP SpO2   17 1503 98.2 °F (36.8 °C) 81 22 136/77 93 %   17 1140 - - - - 96 %   17 1102 98.2 °F (36.8 °C) 83 20 113/76 96 %   17 0755 97.9 °F (36.6 °C) (!) 109 20 122/81 94 %   17 0303 - - - - 98 %   17 0259 - - - 140/78 - 09/23/17 0046 98.2 °F (36.8 °C) (!) 107 20 142/52 94 %   09/23/17 0018 - 98 - - 94 %   09/22/17 2340 - (!) 111 15 136/81 94 %   09/22/17 2330 - 98 19 - 95 %   09/22/17 2229 - 98 20 - 98 %   09/22/17 2115 - 98 21 - 98 %   09/22/17 2100 - 97 16 - 99 %   09/22/17 2030 - 94 17 - 99 %   09/22/17 2000 - 94 18 - 99 %   09/22/17 1949 - - - 100/75 97 %   09/22/17 1845 - 88 19 - 98 %   09/22/17 1830 - 98 18 - 98 %   09/22/17 1800 - 98 19 - 98 %   09/22/17 1730 - (!) 110 18 - 93 %   09/22/17 1700 - 98 17 - 95 %       Intake/Output Summary (Last 24 hours) at 09/23/17 1644  Last data filed at 09/23/17 1500   Gross per 24 hour   Intake              850 ml   Output              850 ml   Net                0 ml        PHYSICAL EXAM:  General:                    Alert, cooperative, morbidely obese no distress pickwickian   look  HEENT:                     Atraumatic, anicteric sclerae, pink conjunctivae                                              No oral ulcers, oral mucosa moist, throat clear, dentition fair  Neck:                                   Supple, symmetrical  Lungs:                       Clear to auscultation bilaterally, no wheezing, rhonchi, or rales. Chest wall:                No tenderness, no accessory muscle use. Heart:                                  Regular rhythm, no murmur, no edema  Abdominal: Soft. Bowel sounds are normal. There is no tenderness. There is no rebound and no guarding. Obese abdomen   Musculoskeletal: Normal range of motion. She exhibits no edema, tenderness or deformity. Neurological: She is alert and oriented to person, place, and time. Skin: Skin is warm and dry.    Wound to left posterior calf, draining foul smelling discharge  Chronic venous statis changes, woody changes to skin   Also with nurse present shehas draining  clean abscess in labia    Reviewed most current lab test results and cultures  YES  Reviewed most current radiology test results   YES  Review and summation of old records today    NO  Reviewed patient's current orders and MAR    YES  PMH/SH reviewed - no change compared to H&P  ________________________________________________________________________  Care Plan discussed with:    Comments   Patient x    Family      RN x    Care Manager     Consultant                        Multidiciplinary team rounds were held today with , nursing, pharmacist and clinical coordinator. Patient's plan of care was discussed; medications were reviewed and discharge planning was addressed. ________________________________________________________________________  Total NON critical care TIME:  30   Minutes    Total CRITICAL CARE TIME Spent:   Minutes non procedure based      Comments   >50% of visit spent in counseling and coordination of care x    ________________________________________________________________________  Pedro Garnett MD     Procedures: see electronic medical records for all procedures/Xrays and details which were not copied into this note but were reviewed prior to creation of Plan. LABS:  I reviewed today's most current labs and imaging studies.   Pertinent labs include:  Recent Labs      09/23/17   0601   WBC  9.6   HGB  12.8   HCT  41.5   PLT  211     Recent Labs      09/23/17   0516  09/22/17   1556   NA  136  136   K  4.3  4.7   CL  102  102   CO2  26  31   GLU  108*  78   BUN  11  11   CREA  0.60  0.63   CA  10.6*  10.3*   MG   --   2.1   ALB   --   3.1*   TBILI   --   0.6   SGOT   --   24   ALT   --   19       Signed: Pedro Garnett MD

## 2017-09-24 PROBLEM — R06.02 SHORTNESS OF BREATH: Status: ACTIVE | Noted: 2017-09-24

## 2017-09-24 LAB
ANION GAP SERPL CALC-SCNC: 4 MMOL/L (ref 5–15)
BASOPHILS # BLD: 0 K/UL (ref 0–0.1)
BASOPHILS NFR BLD: 0 % (ref 0–1)
BUN SERPL-MCNC: 14 MG/DL (ref 6–20)
BUN/CREAT SERPL: 22 (ref 12–20)
CALCIUM SERPL-MCNC: 10 MG/DL (ref 8.5–10.1)
CHLORIDE SERPL-SCNC: 104 MMOL/L (ref 97–108)
CO2 SERPL-SCNC: 31 MMOL/L (ref 21–32)
CREAT SERPL-MCNC: 0.64 MG/DL (ref 0.55–1.02)
EOSINOPHIL # BLD: 0 K/UL (ref 0–0.4)
EOSINOPHIL NFR BLD: 0 % (ref 0–7)
ERYTHROCYTE [DISTWIDTH] IN BLOOD BY AUTOMATED COUNT: 14.8 % (ref 11.5–14.5)
GLUCOSE BLD STRIP.AUTO-MCNC: 102 MG/DL (ref 65–100)
GLUCOSE BLD STRIP.AUTO-MCNC: 159 MG/DL (ref 65–100)
GLUCOSE BLD STRIP.AUTO-MCNC: 192 MG/DL (ref 65–100)
GLUCOSE BLD STRIP.AUTO-MCNC: 94 MG/DL (ref 65–100)
GLUCOSE SERPL-MCNC: 98 MG/DL (ref 65–100)
HCT VFR BLD AUTO: 39 % (ref 35–47)
HGB BLD-MCNC: 11.9 G/DL (ref 11.5–16)
LYMPHOCYTES # BLD: 1.1 K/UL (ref 0.8–3.5)
LYMPHOCYTES NFR BLD: 8 % (ref 12–49)
MCH RBC QN AUTO: 28.5 PG (ref 26–34)
MCHC RBC AUTO-ENTMCNC: 30.5 G/DL (ref 30–36.5)
MCV RBC AUTO: 93.3 FL (ref 80–99)
MONOCYTES # BLD: 1.2 K/UL (ref 0–1)
MONOCYTES NFR BLD: 9 % (ref 5–13)
NEUTS SEG # BLD: 11.2 K/UL (ref 1.8–8)
NEUTS SEG NFR BLD: 83 % (ref 32–75)
PLATELET # BLD AUTO: 216 K/UL (ref 150–400)
POTASSIUM SERPL-SCNC: 4.3 MMOL/L (ref 3.5–5.1)
RBC # BLD AUTO: 4.18 M/UL (ref 3.8–5.2)
SERVICE CMNT-IMP: ABNORMAL
SERVICE CMNT-IMP: NORMAL
SODIUM SERPL-SCNC: 139 MMOL/L (ref 136–145)
WBC # BLD AUTO: 13.6 K/UL (ref 3.6–11)

## 2017-09-24 PROCEDURE — 74011250637 HC RX REV CODE- 250/637: Performed by: INTERNAL MEDICINE

## 2017-09-24 PROCEDURE — 74011250636 HC RX REV CODE- 250/636: Performed by: EMERGENCY MEDICINE

## 2017-09-24 PROCEDURE — 74011000250 HC RX REV CODE- 250: Performed by: INTERNAL MEDICINE

## 2017-09-24 PROCEDURE — 74011636637 HC RX REV CODE- 636/637: Performed by: INTERNAL MEDICINE

## 2017-09-24 PROCEDURE — 80048 BASIC METABOLIC PNL TOTAL CA: CPT | Performed by: INTERNAL MEDICINE

## 2017-09-24 PROCEDURE — 87070 CULTURE OTHR SPECIMN AEROBIC: CPT | Performed by: INTERNAL MEDICINE

## 2017-09-24 PROCEDURE — 74011250636 HC RX REV CODE- 250/636: Performed by: INTERNAL MEDICINE

## 2017-09-24 PROCEDURE — 87186 SC STD MICRODIL/AGAR DIL: CPT | Performed by: INTERNAL MEDICINE

## 2017-09-24 PROCEDURE — 94640 AIRWAY INHALATION TREATMENT: CPT

## 2017-09-24 PROCEDURE — 82962 GLUCOSE BLOOD TEST: CPT

## 2017-09-24 PROCEDURE — 99218 HC RM OBSERVATION: CPT

## 2017-09-24 PROCEDURE — 87077 CULTURE AEROBIC IDENTIFY: CPT | Performed by: INTERNAL MEDICINE

## 2017-09-24 PROCEDURE — A9270 NON-COVERED ITEM OR SERVICE: HCPCS | Performed by: INTERNAL MEDICINE

## 2017-09-24 PROCEDURE — 36415 COLL VENOUS BLD VENIPUNCTURE: CPT | Performed by: INTERNAL MEDICINE

## 2017-09-24 PROCEDURE — 85025 COMPLETE CBC W/AUTO DIFF WBC: CPT | Performed by: INTERNAL MEDICINE

## 2017-09-24 PROCEDURE — 65660000000 HC RM CCU STEPDOWN

## 2017-09-24 PROCEDURE — 77030012879 HC MSK CPAP FLL FAC PHIL -B

## 2017-09-24 PROCEDURE — 77010033678 HC OXYGEN DAILY

## 2017-09-24 RX ORDER — IPRATROPIUM BROMIDE AND ALBUTEROL SULFATE 2.5; .5 MG/3ML; MG/3ML
3 SOLUTION RESPIRATORY (INHALATION)
Status: DISCONTINUED | OUTPATIENT
Start: 2017-09-24 | End: 2017-09-26

## 2017-09-24 RX ADMIN — Medication 10 ML: at 13:47

## 2017-09-24 RX ADMIN — INSULIN LISPRO 2 UNITS: 100 INJECTION, SOLUTION INTRAVENOUS; SUBCUTANEOUS at 16:55

## 2017-09-24 RX ADMIN — MICONAZOLE NITRATE: 20 CREAM TOPICAL at 20:06

## 2017-09-24 RX ADMIN — FUROSEMIDE 40 MG: 40 TABLET ORAL at 09:19

## 2017-09-24 RX ADMIN — ALBUTEROL SULFATE 2.5 MG: 2.5 SOLUTION RESPIRATORY (INHALATION) at 09:39

## 2017-09-24 RX ADMIN — MICONAZOLE NITRATE: 20 CREAM TOPICAL at 11:51

## 2017-09-24 RX ADMIN — AZITHROMYCIN MONOHYDRATE 500 MG: 500 INJECTION, POWDER, LYOPHILIZED, FOR SOLUTION INTRAVENOUS at 20:05

## 2017-09-24 RX ADMIN — Medication 10 ML: at 22:09

## 2017-09-24 RX ADMIN — CYCLOBENZAPRINE HYDROCHLORIDE 5 MG: 10 TABLET, FILM COATED ORAL at 20:18

## 2017-09-24 RX ADMIN — IPRATROPIUM BROMIDE AND ALBUTEROL SULFATE 3 ML: .5; 3 SOLUTION RESPIRATORY (INHALATION) at 20:22

## 2017-09-24 RX ADMIN — ASPIRIN 81 MG: 81 TABLET, COATED ORAL at 09:19

## 2017-09-24 RX ADMIN — FLUTICASONE PROPIONATE 2 SPRAY: 50 SPRAY, METERED NASAL at 09:20

## 2017-09-24 RX ADMIN — PREDNISONE 40 MG: 20 TABLET ORAL at 09:18

## 2017-09-24 RX ADMIN — METOPROLOL TARTRATE 25 MG: 25 TABLET ORAL at 09:19

## 2017-09-24 RX ADMIN — PRAVASTATIN SODIUM 20 MG: 40 TABLET ORAL at 22:09

## 2017-09-24 RX ADMIN — LISINOPRIL 40 MG: 20 TABLET ORAL at 09:19

## 2017-09-24 RX ADMIN — METOPROLOL TARTRATE 25 MG: 25 TABLET ORAL at 17:00

## 2017-09-24 RX ADMIN — IPRATROPIUM BROMIDE AND ALBUTEROL SULFATE 3 ML: .5; 3 SOLUTION RESPIRATORY (INHALATION) at 15:01

## 2017-09-24 RX ADMIN — ENOXAPARIN SODIUM 40 MG: 40 INJECTION SUBCUTANEOUS at 09:18

## 2017-09-24 NOTE — PROGRESS NOTES
Hospitalist Progress Note    NAME: Lana Schwab   :  1970   MRN:  [id number]       Interim Hospital Summary: 41 y.o. female whom presented on 2017 with      Assessment / Plan:    Community acquired pneumonia -multifocal pneumonia slowly improving   Another  day of iv abx  -multiple opacities on CT chest, no pe-  -ceftriaxone and azithromycin, witch to PO in am  -nebs prn      Chronic hypoxic and hypercapneic respiratory failure in setting of obesity hypoventilation syndrome  -at baseline O2 of 5-6 L  -has not had cpap for few months, needs new cpap for home  steroids for now   CAD  HTN  Chronic diastolic HF  -appears compensated, continue lasix PO  -metoprolol, lisinopril   Chronic LE lymphedema / venous stasis POA will ask wound care to see pt-  Lasix   DM  -SSI, hold home PO meds   rt breast candida infection  nystatin   Obesity  Body mass index is 71.82 kg/(m^2).     Code Status: full  Surrogate Decision Maker: son      DVT Prophylaxis: lovenox  GI Prophylaxis: not indicated      Baseline: seems relatively home bound due to comorbid conditions will ask cm needs cpap machine and home haelth               Subjective:     Chief Complaint / Reason for Physician Visit still sob requiring oxygen  . Discussed with RN events overnight. Review of Systems:  Symptom Y/N Comments  Symptom Y/N Comments   Fever/Chills    Chest Pain     Poor Appetite    Edema     Cough    Abdominal Pain     Sputum    Joint Pain     SOB/RAZO    Pruritis/Rash     Nausea/vomit    Tolerating PT/OT     Diarrhea    Tolerating Diet     Constipation    Other       Could NOT obtain due to:      Objective:     VITALS:   Last 24hrs VS reviewed since prior progress note.  Most recent are:  Patient Vitals for the past 24 hrs:   Temp Pulse Resp BP SpO2   17 0805 97.5 °F (36.4 °C) 98 20 121/78 -   17 0408 98 °F (36.7 °C) (!) 118 19 115/73 90 %   17 2352 98.5 °F (36.9 °C) 80 19 130/78 -   17 2152 - - - - 95 % 09/23/17 1907 97.9 °F (36.6 °C) 77 20 139/84 -   09/23/17 1503 98.2 °F (36.8 °C) 81 22 136/77 93 %   09/23/17 1140 - - - - 96 %   09/23/17 1102 98.2 °F (36.8 °C) 83 20 113/76 96 %       Intake/Output Summary (Last 24 hours) at 09/24/17 1044  Last data filed at 09/24/17 0800   Gross per 24 hour   Intake              600 ml   Output             1500 ml   Net             -900 ml        PHYSICAL EXAM:    General:                    Alert, cooperative, morbidely obese no distress pickwickian   look  HEENT:                     Atraumatic, anicteric sclerae, pink conjunctivae                                              No oral ulcers, oral mucosa moist, throat clear, dentition fair  Neck:                                   Supple, symmetrical  Lungs:                        decrease bs vijaya   +++rhonchi, no rales. Chest wall:                No tenderness, no accessory muscle use. Heart:                                  Regular rhythm, no murmur, no edema  Abdominal: Soft. Bowel sounds are normal. There is no tenderness. There is no rebound and no guarding. Obese abdomen   Musculoskeletal: Normal range of motion. She exhibits no edema, tenderness or deformity. Neurological: She is alert and oriented to person, place, and time. Skin: Skin is warm and dry. Reviewed most current lab test results and cultures  YES  Reviewed most current radiology test results   YES  Review and summation of old records today    NO  Reviewed patient's current orders and MAR    YES  PMH/ reviewed - no change compared to H&P  ________________________________________________________________________  Care Plan discussed with:    Comments   Patient x    Family      RN x    Care Manager     Consultant                        Multidiciplinary team rounds were held today with , nursing, pharmacist and clinical coordinator. Patient's plan of care was discussed; medications were reviewed and discharge planning was addressed. ________________________________________________________________________  Total NON critical care TIME:  30    Minutes    Total CRITICAL CARE TIME Spent:   Minutes non procedure based      Comments   >50% of visit spent in counseling and coordination of care x    ________________________________________________________________________  Thersa Skiff, MD     Procedures: see electronic medical records for all procedures/Xrays and details which were not copied into this note but were reviewed prior to creation of Plan. LABS:  I reviewed today's most current labs and imaging studies.   Pertinent labs include:  Recent Labs      09/24/17   0402  09/23/17   0601   WBC  13.6*  9.6   HGB  11.9  12.8   HCT  39.0  41.5   PLT  216  211     Recent Labs      09/24/17   0402  09/23/17   0516  09/22/17   1556   NA  139  136  136   K  4.3  4.3  4.7   CL  104  102  102   CO2  31  26  31   GLU  98  108*  78   BUN  14  11  11   CREA  0.64  0.60  0.63   CA  10.0  10.6*  10.3*   MG   --    --   2.1   ALB   --    --   3.1*   TBILI   --    --   0.6   SGOT   --    --   24   ALT   --    --   19       Signed: Thersa Skiff, MD

## 2017-09-24 NOTE — PROGRESS NOTES
1915 Received report from Ragini Alvarez 139, RN. Assumed patient care. 0030 Pt called out using nursing call button. Wearing BiPap mask and complaining of SOB. Pt very anxious and O2 sat 88%. Adjusted Bipap mask on pt face with no improvement per pt. Removed mask and placed pt on O2 5L NC. O2 sat 92%. Contacted RT to check Bipap machine. Pt stated \"I would rather go without that machine if all it's gonna do is mess up. \" Will continue to monitor pt.          Bedside shift change report given to NORMA Alvarez (oncoming nurse) by Darion Finney RN (offgoing nurse). Report included the following information SBAR, Kardex, Intake/Output, MAR, Recent Results and Cardiac Rhythm NSR/ST.

## 2017-09-24 NOTE — PROGRESS NOTES
Problem: Falls - Risk of  Goal: *Absence of Falls  Document Yu Fall Risk and appropriate interventions in the flowsheet.    Outcome: Progressing Towards Goal  Fall Risk Interventions:  Mobility Interventions: Patient to call before getting OOB, PT Consult for assist device competence, Utilize walker, cane, or other assitive device           Medication Interventions: Assess postural VS orthostatic hypotension, Teach patient to arise slowly, Patient to call before getting OOB     Elimination Interventions: Patient to call for help with toileting needs, Toilet paper/wipes in reach

## 2017-09-24 NOTE — CONSULTS
[facility]  Pulmonary, Critical Care, and Sleep Medicine    Initial Patient Consult    Name: Teodoro Paredes MRN: [id number]   : 1970 Hospital: [facility]    Date: 2017        IMPRESSION:   · Chronic hypoxic/hypercapnic respiratory failure  · GEN/OHS - followed by Dr. Jeff nicole  · COPD with exacerbation - follows with Dr. Alejandro Rankin  · \"pneumonia\" with minimal GGOs on CT - could easily be pulmonary edema  · CAD  · HTN  · DM  · Tobacco use      RECOMMENDATIONS:   · I will order her home settings for her BiPAP as was prescribed by Belia nicole for use while in the hospital  · Recommend contacting her sleep physician, Dr. Ryland Anaya, for orders for outpatient machine - maybe his office knows which DME company she uses and why the prior machine was taken away  · Continue antibiotics   · Schedule bronchdilators  · Recommend checking echo - her CT findings and exam could easily be due to pulmonary edema and worsening heart failure (which she is at risk for)  · Tobacco cessation counseling  · Recommend following up with Dr. Ryland Anaya and Dr. Alejandro Rankin after discharge     Subjective: This patient has been seen and evaluated at the request of Dr. Terry Canchola for \"needs CPAP\". Patient is a 41 y.o. female with GEN/OHS, followed by Dr. Ryland Anaya for her GEN, admitted for pneumonia. She still feels poorly from her pneumonia and continues on antibiotics for that. I was asked to see her to order her nightly CPAP. Her machine was taken away several months ago, possibly for insurance reasons. She does not recall her DME company, but she thinks it may be [employer]. She is still feeling poorly from her pneumonia with ongoing dyspnea.      Past Medical History:   Diagnosis Date    Arthritis     Asthma     CAD (coronary artery disease)     Congestive heart failure (HCC)     COPD (chronic obstructive pulmonary disease) (HCC)     [facility]     Hypertension     Morbid obesity with BMI of 70 and over, adult [facility]     NSTEMI (non-ST elevated myocardial infarction) [facility]     SVT (supraventricular tachycardia) ([facility]       Past Surgical History:   Procedure Laterality Date    CARDIAC SURG PROCEDURE UNLIST      Stents    HX  SECTION      HX ORTHOPAEDIC      HX OTHER SURGICAL      cyst removed from back      Prior to Admission medications    Medication Sig Start Date End Date Taking? Authorizing Provider   fluticasone (FLONASE) 50 mcg/actuation nasal spray 2 Sprays by Both Nostrils route daily. 17   Nesha Gallardo MD   nystatin (MYCOSTATIN) topical cream Apply  to affected area two (2) times a day. 17   Nesha Gallardo MD   HYDROcodone-acetaminophen (NORCO) 5-325 mg per tablet Take 1 Tab by mouth every four (4) hours as needed for Pain. Max Daily Amount: 6 Tabs. 17   Nohemi Huff MD   l.acidoph-B.lactis-B.longum [facility]) 460 mg (7.5-6- 1.5 bill. cell) cap cap Take 1 Cap by mouth Daily (before breakfast). 17   Nohemi Huff MD   albuterol (PROVENTIL VENTOLIN) 2.5 mg /3 mL (0.083 %) nebulizer solution 3 mL by Nebulization route every four (4) hours as needed for Wheezing. 17   Evette Garner MD   furosemide (LASIX) 40 mg tablet Take 40 mg by mouth daily. Landon Raza MD   lovastatin (MEVACOR) 40 mg tablet Take 1 Tab by mouth nightly. 16   Mini Paula NP   clotrimazole (LOTRIMIN) 1 % topical cream Apply to the affected areas twice daily until healed 16   Nell Swartz MD   lisinopril (PRINIVIL, ZESTRIL) 40 mg tablet Take 40 mg by mouth daily. Historical Provider   glyBURIDE (DIABETA) 5 mg tablet Take 5 mg by mouth Daily (before breakfast). Historical Provider   cetirizine (ZYRTEC) 10 mg tablet Take 10 mg by mouth daily as needed for Allergies.     Historical Provider   medroxyPROGESTERone (DEPO-PROVERA) 150 mg/mL injection 150 mg. 5/1/15   Phys Other, MD   omeprazole (PRILOSEC) 40 mg capsule Take 40 mg by mouth daily. 7/18/15   Landon Raza MD   ketoconazole (NIZORAL) 2 % topical cream Apply  to affected area daily. Historical Provider   ammonium lactate (LAC-HYDRIN) 12 % topical cream rub in to affected area well 11/21/14   Jaime Daley MD   aspirin delayed-release 81 mg tablet Take 81 mg by mouth daily. Historical Provider   metoprolol (LOPRESSOR) 25 mg tablet Take 25 mg by mouth two (2) times a day. Historical Provider   nitroglycerin (NITROSTAT) 0.4 mg SL tablet 1 Tab by SubLINGual route every five (5) minutes as needed for Chest Pain (call 911 if not relieved by 3). 9/12/13   David Oliveira NP   albuterol (PROVENTIL, VENTOLIN) 90 mcg/actuation inhaler Take 1-2 Puffs by inhalation every four (4) hours as needed for Wheezing.  9/10/13   Radha Cuevas MD     No Known Allergies   Social History   Substance Use Topics    Smoking status: Current Every Day Smoker     Packs/day: 0.25     Types: Cigarettes    Smokeless tobacco: Never Used    Alcohol use No      Family History   Problem Relation Age of Onset    Heart Disease Mother     Heart Disease Father     Heart Disease Sister         Current Facility-Administered Medications   Medication Dose Route Frequency    miconazole (MICOTIN) 2 % cream   Topical Q12H    azithromycin (ZITHROMAX) 500 mg in 0.9% sodium chloride (MBP/ADV) 250 mL  500 mg IntraVENous Q24H    aspirin delayed-release tablet 81 mg  81 mg Oral DAILY    fluticasone (FLONASE) 50 mcg/actuation nasal spray 2 Spray  2 Spray Both Nostrils DAILY    furosemide (LASIX) tablet 40 mg  40 mg Oral DAILY    lisinopril (PRINIVIL, ZESTRIL) tablet 40 mg  40 mg Oral DAILY    pravastatin (PRAVACHOL) tablet 20 mg  20 mg Oral QHS    metoprolol tartrate (LOPRESSOR) tablet 25 mg  25 mg Oral BID    predniSONE (DELTASONE) tablet 40 mg  40 mg Oral DAILY WITH BREAKFAST    sodium chloride (NS) flush 5-10 mL  5-10 mL IntraVENous Q8H    enoxaparin (LOVENOX) injection 40 mg  40 mg SubCUTAneous Q24H    insulin lispro (HUMALOG) injection   SubCUTAneous AC&HS       Review of Systems:  A comprehensive review of systems was negative except for that written in the HPI. Objective:   Vital Signs:    Visit Vitals    /61 (BP 1 Location: Left arm, BP Patient Position: At rest)    Pulse 81    Temp 97.7 °F (36.5 °C)    Resp 22    Wt (!) 181.2 kg (399 lb 7.6 oz)    SpO2 100%    BMI 64.48 kg/m2       O2 Device: Nasal cannula   O2 Flow Rate (L/min): 5 l/min   Temp (24hrs), Av °F (36.7 °C), Min:97.5 °F (36.4 °C), Max:98.5 °F (36.9 °C)       Intake/Output:   Last shift:      701 - 1900  In: 240 [P.O.:240]  Out: 650 [Urine:650]  Last 3 shifts: 1901 -  07  In: 850 [P.O.:600; I.V.:250]  Out: 850 [Urine:850]    Intake/Output Summary (Last 24 hours) at 17 1200  Last data filed at 17 0800   Gross per 24 hour   Intake              600 ml   Output             1100 ml   Net             -500 ml      Physical Exam:   General:  Alert, cooperative, no distress, appears stated age, morbidly obese   Head:  Normocephalic, without obvious abnormality, atraumatic. Eyes:  Conjunctivae/corneas clear. Nose: Nares normal. Septum midline. Mucosa normal.     Throat: Lips, mucosa, and tongue normal.     Neck: Supple, symmetrical, trachea midline    Back:   Symmetric, no curvature. ROM normal.   Lungs:   Diffuse wheezing and rales   Chest wall:  No tenderness or deformity. Heart:  Regular rate and rhythm    Abdomen:   Soft, non-tender.  Bowel sounds normal.    Extremities: Extremities normal, atraumatic, no cyanosis, +edema   Skin: Skin color, texture, turgor normal. No rashes or lesions   Lymph nodes: Cervical, supraclavicular nodes normal.   Neurologic: Grossly nonfocal     Data review:     Recent Results (from the past 24 hour(s))   GLUCOSE, POC    Collection Time: 17  4:46 PM   Result Value Ref Range    Glucose (POC) 153 (H) 65 - 100 mg/dL    Performed by [address] [place] [address], POC    Collection Time: 09/23/17  9:02 PM   Result Value Ref Range    Glucose (POC) 237 (H) 65 - 100 mg/dL    Performed by Connor Rolon (PCT)    CBC WITH AUTOMATED DIFF    Collection Time: 09/24/17  4:02 AM   Result Value Ref Range    WBC 13.6 (H) 3.6 - 11.0 K/uL    RBC 4.18 3.80 - 5.20 M/uL    HGB 11.9 11.5 - 16.0 g/dL    HCT 39.0 35.0 - 47.0 %    MCV 93.3 80.0 - 99.0 FL    MCH 28.5 26.0 - 34.0 PG    MCHC 30.5 30.0 - 36.5 g/dL    RDW 14.8 (H) 11.5 - 14.5 %    PLATELET 967 097 - 942 K/uL    NEUTROPHILS 83 (H) 32 - 75 %    LYMPHOCYTES 8 (L) 12 - 49 %    MONOCYTES 9 5 - 13 %    EOSINOPHILS 0 0 - 7 %    BASOPHILS 0 0 - 1 %    ABS. NEUTROPHILS 11.2 (H) 1.8 - 8.0 K/UL    ABS. LYMPHOCYTES 1.1 0.8 - 3.5 K/UL    ABS. MONOCYTES 1.2 (H) 0.0 - 1.0 K/UL    ABS. EOSINOPHILS 0.0 0.0 - 0.4 K/UL    ABS.  BASOPHILS 0.0 0.0 - 0.1 K/UL   METABOLIC PANEL, BASIC    Collection Time: 09/24/17  4:02 AM   Result Value Ref Range    Sodium 139 136 - 145 mmol/L    Potassium 4.3 3.5 - 5.1 mmol/L    Chloride 104 97 - 108 mmol/L    CO2 31 21 - 32 mmol/L    Anion gap 4 (L) 5 - 15 mmol/L    Glucose 98 65 - 100 mg/dL    BUN 14 6 - 20 MG/DL    Creatinine 0.64 0.55 - 1.02 MG/DL    BUN/Creatinine ratio 22 (H) 12 - 20      GFR est AA >60 >60 ml/min/1.73m2    GFR est non-AA >60 >60 ml/min/1.73m2    Calcium 10.0 8.5 - 10.1 MG/DL   GLUCOSE, POC    Collection Time: 09/24/17  8:04 AM   Result Value Ref Range    Glucose (POC) 102 (H) 65 - 100 mg/dL    Performed by [address] [place] [address], POC    Collection Time: 09/24/17 11:45 AM   Result Value Ref Range    Glucose (POC) 94 65 - 100 mg/dL    Performed by Elton Robins        Imaging:  I have personally reviewed the patients radiographs and have reviewed the reports:  Mild emphysematous change, minimal GGOs        Camille Bonilla MD

## 2017-09-24 NOTE — PROGRESS NOTES
[address] [facility] SHIFT NURSING NOTE    Bedside shift change report given to Felice Kirk, RN (oncoming nurse) by Chris Gann RN (offgoing nurse). Report included the following information SBAR, Kardex, Intake/Output, MAR and Recent Results. SHIFT SUMMARY:     4239: Called to consult pulmonologist per orders; Dr. Rosa Kirk is on-call and should be seeing patient    : Dr. Rosa Kirk called nurse back regarding consult     1600: New order for bipap    1830: Changed patient's LLE posterior wound, noted drainage with foul smell and slough      Admission Date 9/22/2017   Admission Diagnosis SOB (shortness of breath)   Consults IP CONSULT TO HOSPITALIST  IP CONSULT TO PULMONOLOGY        Consults   [] PT   [] OT   [] Speech   [] Palliative      [] Hospice    [x] Case Management   [] None   Cardiac Monitoring   [x] Yes   [] No     Antibiotics   [x] Yes   [] No   GI Prophylaxis  (Ex: Protonix, Pepcid, etc,.)   [] Yes   [x] No          DVT Prophylaxis   SCDs:             Haroon stockings:         [x] Medication (Ex: Lovenox, Eliquis, Brilinta, Coumadin,  Heparin, etc..)   [] Contraindicated   [] No VTE needed       Urinary Catheter             LDAs               Peripheral IV 09/22/17 Right Antecubital (Active)   Site Assessment Clean, dry, & intact 9/24/2017  8:00 AM   Phlebitis Assessment 0 9/24/2017  8:00 AM   Infiltration Assessment 0 9/24/2017  8:00 AM   Dressing Status Clean, dry, & intact 9/24/2017  8:00 AM   Dressing Type Tape;Transparent 9/24/2017  8:00 AM   Hub Color/Line Status Pink 9/24/2017  8:00 AM                      I/Os   Intake/Output Summary (Last 24 hours) at 09/24/17 1105  Last data filed at 09/24/17 0800   Gross per 24 hour   Intake              600 ml   Output             1500 ml   Net             -900 ml         Activity Level Activity Level: Up ad tiffani     Activity Assistance: Partial (one person)   Diet Active Orders   Diet    DIET REGULAR      Purposeful Rounding every 1-2 hour?    [x] Yes    Yu Score Total Score: 3   Bed Alarm (If score 3 or >)   [] Yes    [] Refused (See signed refusal form in chart)   Jeffrey Score  Jeffrey Score: 18       Jeffrey Score (if score 14 or less)   [] PMT consult   [] Nutrition consult   [x] Wound Care consult      []  Specialty bed         Influenza Vaccine Received Flu Vaccine for Current Season (usually Sept-March): No    Patient/Guardian Refused (Notify MD): Yes          Needs prior to discharge:   Home O2 required:    [x] Yes   [] No     If yes, how much O2 required? Other:    Last Bowel Movement Date: 09/23/17   Readmission Risk Assessment Tool Score High Risk            22       Total Score        3 Has Seen PCP in Last 6 Months (Yes=3, No=0)    4 IP Visits Last 12 Months (1-3=4, 4=9, >4=11)    9 Pt. Coverage (Medicare=5 , Medicaid, or Self-Pay=4)    6 Charlson Comorbidity Score (Age + Comorbid Conditions)        Criteria that do not apply:    . Living with Significant Other. Assisted Living. LTAC. SNF.  or   Rehab    Patient Length of Stay (>5 days = 3)       Expected Length of Stay - - -   Actual Length of Stay 0

## 2017-09-24 NOTE — PROGRESS NOTES
[address] [facility] SHIFT NURSING NOTE    Bedside and Verbal shift change report given to [id number] [facility] [facility] (oncoming nurse) by Brando Hennessy (offgoing nurse). Report included the following information SBAR. SHIFT SUMMARY:         Admission Date 9/22/2017   Admission Diagnosis SOB (shortness of breath)   Consults IP CONSULT TO HOSPITALIST        Consults   [] PT   [] OT   [] Speech   [] Palliative      [] Hospice    [x] Case Management   [] None   Cardiac Monitoring   [x] Yes   [] No     Antibiotics   [x] Yes   [] No   GI Prophylaxis  (Ex: Protonix, Pepcid, etc,.)   [] Yes   [] No          DVT Prophylaxis   SCDs:             Haroon stockings:         [x] Medication (Ex: Lovenox, Eliquis, Brilinta, Coumadin,  Heparin, etc..)   [] Contraindicated   [] No VTE needed       Urinary Catheter             LDAs               Peripheral IV 09/22/17 Right Antecubital (Active)   Site Assessment Clean, dry, & intact 9/23/2017  9:00 PM   Phlebitis Assessment 0 9/23/2017  3:00 PM   Infiltration Assessment 0 9/23/2017  9:00 PM   Dressing Status Clean, dry, & intact 9/23/2017  9:00 PM   Dressing Type Transparent;Tape 9/23/2017  9:00 PM   Hub Color/Line Status Purple;Capped;Flushed 9/23/2017  9:00 PM                      I/Os   Intake/Output Summary (Last 24 hours) at 09/24/17 0109  Last data filed at 09/23/17 1500   Gross per 24 hour   Intake              600 ml   Output              850 ml   Net             -250 ml         Activity Level Activity Level: Up with Assistance     Activity Assistance: Partial (one person)   Diet Active Orders   Diet    DIET REGULAR      Purposeful Rounding every 1-2 hour?    [x] Yes    Yu Score  Total Score: 3   Bed Alarm (If score 3 or >)   [] Yes    [] Refused (See signed refusal form in chart)   Jeffrey Score  Jeffrey Score: 18       Jeffrey Score (if score 14 or less)   [] PMT consult   [] Nutrition consult   [x] Wound Care consult      []  Specialty bed         Influenza Vaccine Received Flu Vaccine for Current Season (usually Sept-March): No    Patient/Guardian Refused (Notify MD): Yes          Needs prior to discharge:   Home O2 required:    [x] Yes   [] No     If yes, how much O2 required? 3    Other:    Last Bowel Movement Date: 09/23/17   Readmission Risk Assessment Tool Score High Risk            22       Total Score        3 Has Seen PCP in Last 6 Months (Yes=3, No=0)    4 IP Visits Last 12 Months (1-3=4, 4=9, >4=11)    9 Pt. Coverage (Medicare=5 , Medicaid, or Self-Pay=4)    6 Charlson Comorbidity Score (Age + Comorbid Conditions)        Criteria that do not apply:    . Living with Significant Other. Assisted Living. LTAC. SNF.  or   Rehab    Patient Length of Stay (>5 days = 3)       Expected Length of Stay - - -   Actual Length of Stay 0

## 2017-09-25 ENCOUNTER — TELEPHONE (OUTPATIENT)
Dept: SLEEP MEDICINE | Age: 47
End: 2017-09-25

## 2017-09-25 DIAGNOSIS — E66.2 OBESITY HYPOVENTILATION SYNDROME (HCC): ICD-10-CM

## 2017-09-25 DIAGNOSIS — J44.9 CHRONIC OBSTRUCTIVE PULMONARY DISEASE, UNSPECIFIED COPD TYPE (HCC): ICD-10-CM

## 2017-09-25 DIAGNOSIS — G47.33 OSA (OBSTRUCTIVE SLEEP APNEA): Primary | ICD-10-CM

## 2017-09-25 LAB
ANION GAP SERPL CALC-SCNC: 5 MMOL/L (ref 5–15)
BASOPHILS # BLD: 0 K/UL (ref 0–0.1)
BASOPHILS NFR BLD: 0 % (ref 0–1)
BUN SERPL-MCNC: 17 MG/DL (ref 6–20)
BUN/CREAT SERPL: 23 (ref 12–20)
CALCIUM SERPL-MCNC: 9.6 MG/DL (ref 8.5–10.1)
CHLORIDE SERPL-SCNC: 102 MMOL/L (ref 97–108)
CO2 SERPL-SCNC: 27 MMOL/L (ref 21–32)
CREAT SERPL-MCNC: 0.73 MG/DL (ref 0.55–1.02)
EOSINOPHIL # BLD: 0 K/UL (ref 0–0.4)
EOSINOPHIL NFR BLD: 0 % (ref 0–7)
ERYTHROCYTE [DISTWIDTH] IN BLOOD BY AUTOMATED COUNT: 14.8 % (ref 11.5–14.5)
GLUCOSE BLD STRIP.AUTO-MCNC: 100 MG/DL (ref 65–100)
GLUCOSE BLD STRIP.AUTO-MCNC: 131 MG/DL (ref 65–100)
GLUCOSE BLD STRIP.AUTO-MCNC: 227 MG/DL (ref 65–100)
GLUCOSE BLD STRIP.AUTO-MCNC: 237 MG/DL (ref 65–100)
GLUCOSE BLD STRIP.AUTO-MCNC: 73 MG/DL (ref 65–100)
GLUCOSE BLD STRIP.AUTO-MCNC: 74 MG/DL (ref 65–100)
GLUCOSE SERPL-MCNC: 214 MG/DL (ref 65–100)
HCT VFR BLD AUTO: 37.9 % (ref 35–47)
HGB BLD-MCNC: 11.8 G/DL (ref 11.5–16)
LYMPHOCYTES # BLD: 1 K/UL (ref 0.8–3.5)
LYMPHOCYTES NFR BLD: 7 % (ref 12–49)
MCH RBC QN AUTO: 28.9 PG (ref 26–34)
MCHC RBC AUTO-ENTMCNC: 31.1 G/DL (ref 30–36.5)
MCV RBC AUTO: 92.7 FL (ref 80–99)
MONOCYTES # BLD: 1.1 K/UL (ref 0–1)
MONOCYTES NFR BLD: 7 % (ref 5–13)
NEUTS SEG # BLD: 12.6 K/UL (ref 1.8–8)
NEUTS SEG NFR BLD: 86 % (ref 32–75)
PLATELET # BLD AUTO: 261 K/UL (ref 150–400)
POTASSIUM SERPL-SCNC: 4.6 MMOL/L (ref 3.5–5.1)
RBC # BLD AUTO: 4.09 M/UL (ref 3.8–5.2)
SERVICE CMNT-IMP: ABNORMAL
SERVICE CMNT-IMP: NORMAL
SODIUM SERPL-SCNC: 134 MMOL/L (ref 136–145)
WBC # BLD AUTO: 14.7 K/UL (ref 3.6–11)

## 2017-09-25 PROCEDURE — 82962 GLUCOSE BLOOD TEST: CPT

## 2017-09-25 PROCEDURE — 74011250636 HC RX REV CODE- 250/636: Performed by: EMERGENCY MEDICINE

## 2017-09-25 PROCEDURE — 36415 COLL VENOUS BLD VENIPUNCTURE: CPT | Performed by: INTERNAL MEDICINE

## 2017-09-25 PROCEDURE — 85025 COMPLETE CBC W/AUTO DIFF WBC: CPT | Performed by: INTERNAL MEDICINE

## 2017-09-25 PROCEDURE — 74011250636 HC RX REV CODE- 250/636: Performed by: INTERNAL MEDICINE

## 2017-09-25 PROCEDURE — 74011250637 HC RX REV CODE- 250/637: Performed by: INTERNAL MEDICINE

## 2017-09-25 PROCEDURE — 65660000000 HC RM CCU STEPDOWN

## 2017-09-25 PROCEDURE — 94640 AIRWAY INHALATION TREATMENT: CPT

## 2017-09-25 PROCEDURE — 74011000250 HC RX REV CODE- 250: Performed by: INTERNAL MEDICINE

## 2017-09-25 PROCEDURE — 77030013140 HC MSK NEB VYRM -A

## 2017-09-25 PROCEDURE — 77030012879 HC MSK CPAP FLL FAC PHIL -B

## 2017-09-25 PROCEDURE — 74011636637 HC RX REV CODE- 636/637: Performed by: INTERNAL MEDICINE

## 2017-09-25 PROCEDURE — 80048 BASIC METABOLIC PNL TOTAL CA: CPT | Performed by: INTERNAL MEDICINE

## 2017-09-25 RX ORDER — ALBUTEROL SULFATE 90 UG/1
1 AEROSOL, METERED RESPIRATORY (INHALATION)
Status: DISCONTINUED | OUTPATIENT
Start: 2017-09-25 | End: 2017-09-27 | Stop reason: HOSPADM

## 2017-09-25 RX ORDER — ENOXAPARIN SODIUM 100 MG/ML
40 INJECTION SUBCUTANEOUS EVERY 12 HOURS
Status: DISCONTINUED | OUTPATIENT
Start: 2017-09-25 | End: 2017-09-27 | Stop reason: HOSPADM

## 2017-09-25 RX ADMIN — LISINOPRIL 40 MG: 20 TABLET ORAL at 08:47

## 2017-09-25 RX ADMIN — ALBUTEROL SULFATE 1 PUFF: 90 AEROSOL, METERED RESPIRATORY (INHALATION) at 17:35

## 2017-09-25 RX ADMIN — INSULIN LISPRO 3 UNITS: 100 INJECTION, SOLUTION INTRAVENOUS; SUBCUTANEOUS at 17:40

## 2017-09-25 RX ADMIN — IPRATROPIUM BROMIDE AND ALBUTEROL SULFATE 3 ML: .5; 3 SOLUTION RESPIRATORY (INHALATION) at 06:59

## 2017-09-25 RX ADMIN — METOPROLOL TARTRATE 25 MG: 25 TABLET ORAL at 17:41

## 2017-09-25 RX ADMIN — ENOXAPARIN SODIUM 40 MG: 40 INJECTION SUBCUTANEOUS at 21:42

## 2017-09-25 RX ADMIN — MICONAZOLE NITRATE: 20 CREAM TOPICAL at 08:49

## 2017-09-25 RX ADMIN — FLUTICASONE PROPIONATE 2 SPRAY: 50 SPRAY, METERED NASAL at 08:48

## 2017-09-25 RX ADMIN — CYCLOBENZAPRINE HYDROCHLORIDE 5 MG: 10 TABLET, FILM COATED ORAL at 17:39

## 2017-09-25 RX ADMIN — PRAVASTATIN SODIUM 20 MG: 40 TABLET ORAL at 21:40

## 2017-09-25 RX ADMIN — ENOXAPARIN SODIUM 40 MG: 40 INJECTION SUBCUTANEOUS at 08:48

## 2017-09-25 RX ADMIN — Medication 10 ML: at 21:42

## 2017-09-25 RX ADMIN — Medication 10 ML: at 05:48

## 2017-09-25 RX ADMIN — IPRATROPIUM BROMIDE AND ALBUTEROL SULFATE 3 ML: .5; 3 SOLUTION RESPIRATORY (INHALATION) at 20:24

## 2017-09-25 RX ADMIN — PREDNISONE 40 MG: 20 TABLET ORAL at 08:47

## 2017-09-25 RX ADMIN — IPRATROPIUM BROMIDE AND ALBUTEROL SULFATE 3 ML: .5; 3 SOLUTION RESPIRATORY (INHALATION) at 15:01

## 2017-09-25 RX ADMIN — ASPIRIN 81 MG: 81 TABLET, COATED ORAL at 08:47

## 2017-09-25 RX ADMIN — Medication: at 11:57

## 2017-09-25 RX ADMIN — ALBUTEROL SULFATE 1 PUFF: 90 AEROSOL, METERED RESPIRATORY (INHALATION) at 11:58

## 2017-09-25 RX ADMIN — METOPROLOL TARTRATE 25 MG: 25 TABLET ORAL at 08:47

## 2017-09-25 RX ADMIN — INSULIN LISPRO 2 UNITS: 100 INJECTION, SOLUTION INTRAVENOUS; SUBCUTANEOUS at 21:41

## 2017-09-25 RX ADMIN — Medication 10 ML: at 12:02

## 2017-09-25 RX ADMIN — MICONAZOLE NITRATE: 20 CREAM TOPICAL at 21:42

## 2017-09-25 RX ADMIN — IPRATROPIUM BROMIDE AND ALBUTEROL SULFATE 3 ML: .5; 3 SOLUTION RESPIRATORY (INHALATION) at 11:31

## 2017-09-25 RX ADMIN — FUROSEMIDE 40 MG: 40 TABLET ORAL at 08:47

## 2017-09-25 RX ADMIN — AZITHROMYCIN MONOHYDRATE 500 MG: 500 INJECTION, POWDER, LYOPHILIZED, FOR SOLUTION INTRAVENOUS at 21:41

## 2017-09-25 NOTE — CARDIO/PULMONARY
Cardiopulmonary Rehab Nursing Entry:    Chart reviewed. Pt is a 41 y.o. female admitted with Community acquired pneumonia      PMH includes CAD, HTN, diastolic CHF, DM, NSTEMI, COPD, and smoker. Last visited for HF teaching 5/26/15. Pt has CHF folder at her bedside as provided by [facility] nursing staff. This was a follow-up visit to answer questions and reinforce prior teaching re: CHF, S&Ss, medication management, Low NA diet, daily weights and when to call the doctor. Pt immediately informed [facility] staff that she knows what she should be eating and to \"not mess around with her meal tray\". Informed pt that [facility] nursing staff provides pt teaching regarding at home recommendations and does not make diet adjustments to in-pt meal trays. Reminded pt to adhere to low salt diet, avoiding added table salt and processed foods, to weigh herself daily. She denied access to a scale. She monitors symptoms. She reports a medication schedule, \"until I felt so bad that I didn't take anything on Friday\". Pt gets up out of bed and around her house at home when she is feeling fine, with progressive worsening of recent symptoms her activity decreased. Advised pt to gradually return to activity level, allowing periods of rest.     Pt verbalized understanding.

## 2017-09-25 NOTE — CONSULTS
[facility]  Pulmonary, Critical Care, and Sleep Medicine    Patient Consult    Name: Kushal Lima MRN: [id number]   : 1970 Hospital: [facility]   Date: 2017        IMPRESSION:   · Chronic hypoxic/hypercapnic respiratory failure  · GEN/OHS - followed by Dr. Irina Skaggs sleep  · COPD with exacerbation - follows with Dr. Cat Correa  · \"pneumonia\" with minimal GGOs on CT - could easily be pulmonary edema  · Atypical pain along her left chest, soft tissue. · Debility. · Morbid obesity. · Chronic Lower extremity edema. · CAD  · HTN  · DM  · Tobacco use      RECOMMENDATIONS:   · I will order her home settings for her BiPAP as was prescribed by [address] [place] [address] for use while in the hospital  · Recommend contacting her sleep physician, Dr. Ana Maria Wright, for orders for outpatient machine upon discharge. · Continue antibiotics   · Schedule bronchdilators  · Recommend checking echo - her CT findings and exam could easily be due to pulmonary edema and worsening heart failure. · Tobacco cessation counseling  · PT and OT. · Recommend following up with Dr. Ana Maria Wright and Dr. Cat Correa after discharge  · Will follow along with you. Subjective: This patient has been seen and evaluated at the request of Dr. Aliza Desir for \"needs CPAP\". Patient is a 41 y.o. female with GEN/OHS, followed by Dr. Ana Maria Wright for her GEN, admitted for pneumonia. She still feels poorly from her pneumonia and continues on antibiotics for that. I was asked to see her to order her nightly CPAP. Her machine was taken away several months ago, possibly for insurance reasons. She does not recall her [employer] company, but she thinks it may be [employer]. She is still feeling poorly from her pneumonia with ongoing dyspnea.      Past Medical History:   Diagnosis Date    Arthritis     Asthma     CAD (coronary artery disease)     Congestive heart failure (HCC)     COPD (chronic obstructive pulmonary disease) ([facility]     Diabetes ([facility]     Hypertension     Morbid obesity with BMI of 70 and over, adult ([facility]     NSTEMI (non-ST elevated myocardial infarction) ([facility]     SVT (supraventricular tachycardia) ([facility]       Past Surgical History:   Procedure Laterality Date    CARDIAC SURG PROCEDURE UNLIST      Stents    HX  SECTION      HX ORTHOPAEDIC      HX OTHER SURGICAL      cyst removed from back      Prior to Admission medications    Medication Sig Start Date End Date Taking? Authorizing Provider   fluticasone (FLONASE) 50 mcg/actuation nasal spray 2 Sprays by Both Nostrils route daily. 17   Nesha Gallardo MD   nystatin (MYCOSTATIN) topical cream Apply  to affected area two (2) times a day. 17   Nesha Gallardo MD   HYDROcodone-acetaminophen (NORCO) 5-325 mg per tablet Take 1 Tab by mouth every four (4) hours as needed for Pain. Max Daily Amount: 6 Tabs. 17   Alejandrina Watson MD   l.acidoph-B.lactis-B.longum [facility]) 460 mg (7.5-6- 1.5 bill. cell) cap cap Take 1 Cap by mouth Daily (before breakfast). 17   Alejandrina Watson MD   albuterol (PROVENTIL VENTOLIN) 2.5 mg /3 mL (0.083 %) nebulizer solution 3 mL by Nebulization route every four (4) hours as needed for Wheezing. 17 MARI Garner MD   furosemide (LASIX) 40 mg tablet Take 40 mg by mouth daily. Landon Raza MD   lovastatin (MEVACOR) 40 mg tablet Take 1 Tab by mouth nightly. 16   Kamila Martin NP   clotrimazole (LOTRIMIN) 1 % topical cream Apply to the affected areas twice daily until healed 16   Jes Dickens MD   lisinopril (PRINIVIL, ZESTRIL) 40 mg tablet Take 40 mg by mouth daily. Historical Provider   glyBURIDE (DIABETA) 5 mg tablet Take 5 mg by mouth Daily (before breakfast). Historical Provider   cetirizine (ZYRTEC) 10 mg tablet Take 10 mg by mouth daily as needed for Allergies.     Historical Provider   medroxyPROGESTERone (DEPO-PROVERA) 150 mg/mL injection 150 mg. 5/1/15   Phys MD Luz Marina   omeprazole (PRILOSEC) 40 mg capsule Take 40 mg by mouth daily. 7/18/15   Phys MD Luz Marina   ketoconazole (NIZORAL) 2 % topical cream Apply  to affected area daily. Historical Provider   ammonium lactate (LAC-HYDRIN) 12 % topical cream rub in to affected area well 11/21/14   Darrion Kim MD   aspirin delayed-release 81 mg tablet Take 81 mg by mouth daily. Historical Provider   metoprolol (LOPRESSOR) 25 mg tablet Take 25 mg by mouth two (2) times a day. Historical Provider   nitroglycerin (NITROSTAT) 0.4 mg SL tablet 1 Tab by SubLINGual route every five (5) minutes as needed for Chest Pain (call 911 if not relieved by 3). 9/12/13   Zach Benson NP   albuterol (PROVENTIL, VENTOLIN) 90 mcg/actuation inhaler Take 1-2 Puffs by inhalation every four (4) hours as needed for Wheezing.  9/10/13   Lise Millan MD     No Known Allergies   Social History   Substance Use Topics    Smoking status: Current Every Day Smoker     Packs/day: 0.25     Types: Cigarettes    Smokeless tobacco: Never Used    Alcohol use No      Family History   Problem Relation Age of Onset    Heart Disease Mother     Heart Disease Father     Heart Disease Sister         Current Facility-Administered Medications   Medication Dose Route Frequency    albuterol-ipratropium (DUO-NEB) 2.5 MG-0.5 MG/3 ML  3 mL Nebulization QID RT    miconazole (MICOTIN) 2 % cream   Topical Q12H    azithromycin (ZITHROMAX) 500 mg in 0.9% sodium chloride (MBP/ADV) 250 mL  500 mg IntraVENous Q24H    aspirin delayed-release tablet 81 mg  81 mg Oral DAILY    fluticasone (FLONASE) 50 mcg/actuation nasal spray 2 Spray  2 Spray Both Nostrils DAILY    furosemide (LASIX) tablet 40 mg  40 mg Oral DAILY    lisinopril (PRINIVIL, ZESTRIL) tablet 40 mg  40 mg Oral DAILY    pravastatin (PRAVACHOL) tablet 20 mg  20 mg Oral QHS    metoprolol tartrate (LOPRESSOR) tablet 25 mg  25 mg Oral BID    predniSONE (DELTASONE) tablet 40 mg  40 mg Oral DAILY WITH BREAKFAST    sodium chloride (NS) flush 5-10 mL  5-10 mL IntraVENous Q8H    enoxaparin (LOVENOX) injection 40 mg  40 mg SubCUTAneous Q24H    insulin lispro (HUMALOG) injection   SubCUTAneous AC&HS       Review of Systems:  A comprehensive review of systems was negative except for that written in the HPI. Objective:   Vital Signs:    Visit Vitals    /86 (BP 1 Location: Right arm, BP Patient Position: Sitting)    Pulse 84    Temp 99.5 °F (37.5 °C)    Resp 16    Wt (!) 184 kg (405 lb 10.3 oz)    SpO2 90%    BMI 65.47 kg/m2       O2 Device: Nasal cannula   O2 Flow Rate (L/min): 5 l/min   Temp (24hrs), Av.4 °F (36.9 °C), Min:97.7 °F (36.5 °C), Max:99.5 °F (37.5 °C)       Intake/Output:   Last shift:         Last 3 shifts:  1901 -  0700  In: 840 [P.O.:340; I.V.:500]  Out: 1000 [Urine:1000]    Intake/Output Summary (Last 24 hours) at 17 0950  Last data filed at 17 0400   Gross per 24 hour   Intake              600 ml   Output              350 ml   Net              250 ml      Physical Exam:   General:  Alert, cooperative, no distress, appears stated age, morbidly obese   Head:  Normocephalic, without obvious abnormality, atraumatic. Eyes:  Conjunctivae/corneas clear. Nose: Nares normal. Septum midline. Mucosa normal.     Throat: Lips, mucosa, and tongue normal.     Neck: Supple, symmetrical, trachea midline    Back:   Symmetric, no curvature. ROM normal.   Lungs:   Diffuse wheezing and rales   Chest wall:  No tenderness or deformity. Heart:  Regular rate and rhythm    Abdomen:   Soft, non-tender. Bowel sounds normal.    Extremities: Extremities normal, atraumatic, no cyanosis, +edema, appears to be chronic.     Skin: Skin color, texture, turgor normal. No rashes or lesions   Lymph nodes: Cervical, supraclavicular nodes normal.   Neurologic: Grossly nonfocal     Data review:     Recent Results (from the past 24 hour(s))   GLUCOSE, POC    Collection Time: 17 11:45 AM Result Value Ref Range    Glucose (POC) 94 65 - 100 mg/dL    Performed by [address] [place] [address], POC    Collection Time: 09/24/17  4:45 PM   Result Value Ref Range    Glucose (POC) 159 (H) 65 - 100 mg/dL    Performed by [address] [place] [address], POC    Collection Time: 09/24/17  9:30 PM   Result Value Ref Range    Glucose (POC) 192 (H) 65 - 100 mg/dL    Performed by Leticia CASAS    CBC WITH AUTOMATED DIFF    Collection Time: 09/25/17 12:28 AM   Result Value Ref Range    WBC 14.7 (H) 3.6 - 11.0 K/uL    RBC 4.09 3.80 - 5.20 M/uL    HGB 11.8 11.5 - 16.0 g/dL    HCT 37.9 35.0 - 47.0 %    MCV 92.7 80.0 - 99.0 FL    MCH 28.9 26.0 - 34.0 PG    MCHC 31.1 30.0 - 36.5 g/dL    RDW 14.8 (H) 11.5 - 14.5 %    PLATELET 465 328 - 125 K/uL    NEUTROPHILS 86 (H) 32 - 75 %    LYMPHOCYTES 7 (L) 12 - 49 %    MONOCYTES 7 5 - 13 %    EOSINOPHILS 0 0 - 7 %    BASOPHILS 0 0 - 1 %    ABS. NEUTROPHILS 12.6 (H) 1.8 - 8.0 K/UL    ABS. LYMPHOCYTES 1.0 0.8 - 3.5 K/UL    ABS. MONOCYTES 1.1 (H) 0.0 - 1.0 K/UL    ABS. EOSINOPHILS 0.0 0.0 - 0.4 K/UL    ABS.  BASOPHILS 0.0 0.0 - 0.1 K/UL   METABOLIC PANEL, BASIC    Collection Time: 09/25/17  1:05 AM   Result Value Ref Range    Sodium 134 (L) 136 - 145 mmol/L    Potassium 4.6 3.5 - 5.1 mmol/L    Chloride 102 97 - 108 mmol/L    CO2 27 21 - 32 mmol/L    Anion gap 5 5 - 15 mmol/L    Glucose 214 (H) 65 - 100 mg/dL    BUN 17 6 - 20 MG/DL    Creatinine 0.73 0.55 - 1.02 MG/DL    BUN/Creatinine ratio 23 (H) 12 - 20      GFR est AA >60 >60 ml/min/1.73m2    GFR est non-AA >60 >60 ml/min/1.73m2    Calcium 9.6 8.5 - 10.1 MG/DL   GLUCOSE, POC    Collection Time: 09/25/17  7:57 AM   Result Value Ref Range    Glucose (POC) 74 65 - 100 mg/dL    Performed by Daisha Franco (PCT)    GLUCOSE, POC    Collection Time: 09/25/17  8:32 AM   Result Value Ref Range    Glucose (POC) 73 65 - 100 mg/dL    Performed by Daisha Franco (PCT)    GLUCOSE, POC    Collection Time: 09/25/17  8:54 AM   Result Value Ref Range Glucose (POC) 100 65 - 100 mg/dL    Performed by Nany Locke        Imaging:  I have personally reviewed the patients radiographs and have reviewed the reports:  Mild emphysematous change, minimal GGOs        Quentin José MD

## 2017-09-25 NOTE — TELEPHONE ENCOUNTER
Gavin Botello- [id number]([phone]) called and stated that the patient is in the hospital and needs a new Bipap. Gavin Botello will be faxing over the hospital notes regarding this. Spoke with her and told her that [facility] is out of business and that we can set this patient up with [facility].

## 2017-09-25 NOTE — PROGRESS NOTES
Hospitalist Progress Note    NAME: Luh Velazquez   :  1970   MRN:  [id number]       Interim Hospital Summary: 41 y.o. female whom presented on 2017 with      Assessment / Plan:    Multifocal Community acquired pneumonia   Chronic hypoxic and hypercapneic respiratory failure in setting of OHS  COPD exacerbation  -on baseline O2 of 5-6 L. She has not had cpap for few months and missed her last sleep study appointment  -CT chest Multifocal patchy airspace disease  -continue ceftriaxone and azithromycin  -continue prednisone taper. Nebs prn  -appreciate pulmonary input.     -following up with Dr. Susana Tate and Dr. Gato Del Rio after discharge. CAD / HTN  Chronic diastolic HF  -appears compensated, continue lasix PO  -metoprolol, lisinopril    Chronic LE lymphedema / venous stasis POA  -wound care to see pt    DM  -SSI, hold home PO meds    Candida intertrigo  Severe Obesity  -Body mass index is 71.82 kg/(m^2).         Code Status: full  Surrogate Decision Maker: son     DVT Prophylaxis: lovenox  GI Prophylaxis: not indicated     Baseline: seems relatively home bound due to comorbid conditions         Subjective:     Chief Complaint / Reason for Physician Visit feels well no c/o wants to eat more  Discussed with RN events overnight. Still coughing and bringing up white sputum    Review of Systems:  Symptom Y/N Comments  Symptom Y/N Comments   Fever/Chills    Chest Pain     Poor Appetite    Edema     Cough y   Abdominal Pain     Sputum y   Joint Pain     SOB/RAZO    Pruritis/Rash     Nausea/vomit    Tolerating PT/OT     Diarrhea    Tolerating Diet     Constipation    Other       Could NOT obtain due to:      Objective:     VITALS:   Last 24hrs VS reviewed since prior progress note.  Most recent are:  Patient Vitals for the past 24 hrs:   Temp Pulse Resp BP SpO2   17 0753 99.5 °F (37.5 °C) 84 16 137/86 90 %   17 0659 - - - - 90 %   17 0622 - 84 - - 92 %   17 0400 98.2 °F (36.8 °C) 80 22 106/59 91 %   09/25/17 0113 - - - - 97 %   09/24/17 2241 97.9 °F (36.6 °C) 78 20 137/84 97 %   09/24/17 2020 - - - - 92 %   09/24/17 1931 98.4 °F (36.9 °C) 83 20 138/78 92 %   09/24/17 1517 98.7 °F (37.1 °C) 85 20 111/79 91 %   09/24/17 1144 97.7 °F (36.5 °C) 81 22 109/61 100 %       Intake/Output Summary (Last 24 hours) at 09/25/17 0844  Last data filed at 09/25/17 0400   Gross per 24 hour   Intake              600 ml   Output              350 ml   Net              250 ml        PHYSICAL EXAM:  General:                Alert, cooperative, morbidely obese no distress pickwickian   look  HEENT:                 Atraumatic, anicteric sclerae, pink conjunctivae  Neck:                     Supple, symmetrical  Lungs:                   Clear to auscultation bilaterally, no wheezing, rhonchi, or rales. Chest wall:             No tenderness, no accessory muscle use. Heart:                     Regular rhythm, no murmur, no edema  Abdominal:        Soft. Bowel sounds are normal. There is no tenderness. There is no rebound and no guarding. Musculoskeletal: Normal range of motion. She exhibits no edema, tenderness or deformity. Neurological:  She is alert and oriented to person, place, and time. Skin: Skin is warm and dry.  Wound to left posterior calf, covered with dressing; Chronic venous statis changes, woody changes to skin   Also with nurse present shehas draining  clean abscess in labia    Reviewed most current lab test results and cultures  YES  Reviewed most current radiology test results   YES  Review and summation of old records today    NO  Reviewed patient's current orders and MAR    YES  PMH/SH reviewed - no change compared to H&P  ________________________________________________________________________  Care Plan discussed with:    Comments   Patient x    Family      RN x    Care Manager     Consultant                        Multidiciplinary team rounds were held today with , nursing, pharmacist and clinical coordinator. Patient's plan of care was discussed; medications were reviewed and discharge planning was addressed. ________________________________________________________________________  Total NON critical care TIME:  25   Minutes    Total CRITICAL CARE TIME Spent:   Minutes non procedure based      Comments   >50% of visit spent in counseling and coordination of care x    ________________________________________________________________________  Brionna Benoit MD     Procedures: see electronic medical records for all procedures/Xrays and details which were not copied into this note but were reviewed prior to creation of Plan. LABS:  I reviewed today's most current labs and imaging studies.   Pertinent labs include:  Recent Labs      09/25/17   0028  09/24/17   0402  09/23/17   0601   WBC  14.7*  13.6*  9.6   HGB  11.8  11.9  12.8   HCT  37.9  39.0  41.5   PLT  261  216  211     Recent Labs      09/25/17   0105  09/24/17   0402  09/23/17   0516  09/22/17   1556   NA  134*  139  136  136   K  4.6  4.3  4.3  4.7   CL  102  104  102  102   CO2  27  31  26  31   GLU  214*  98  108*  78   BUN  17  14  11  11   CREA  0.73  0.64  0.60  0.63   CA  9.6  10.0  10.6*  10.3*   MG   --    --    --   2.1   ALB   --    --    --   3.1*   TBILI   --    --    --   0.6   SGOT   --    --    --   24   ALT   --    --    --   19       Signed: Brionna Benoit MD

## 2017-09-25 NOTE — PROGRESS NOTES
[facility] [facility] SHIFT NURSING NOTE    Bedside shift change report given to Ivelisse Clark, RN (oncoming nurse) by Xuan Lawton, RN (offgoing nurse). Report included the following information SBAR, Kardex, MAR, Recent Results and Cardiac Rhythm SR.    SHIFT SUMMARY: 08:20 - Just noted that BS was 74, was not told by tech. Administering juice per protocol. 08:32 - BS 73. Additional 120mL of OJ given. Will monitor. 08:54 - . Breakfast tray has arrived. 10:37 - Notified by echo lab that all patient's previous echocardiograms were of extremely poor quality due to body habitus and questioned whether another should be done. Paged Dr Parth Raines (Rey Merlos is ordering MD). 10:44 - Canceled echo per Dr. Parth Raines. 13:15 - Called dietary to inquire where patient's chicken salad and dinner roll are. They stated the patient has a tray coming. Patient complaining about breakfast and lunch not only being late but not good/right. \"The fruit is all bad. The salad is bad. They really need to check the food before sending it to patients. I couldn't eat the soup because it's so bad. \"  Notified dietary to please send a representative to speak with patient regarding her concerns. 14:05 - Bariatric [facility] bed arrived for patient. After setting it up to her liking and getting in, she immediately does not like it and is disgruntled. \"I want my old bed back, I didn't want all this! \"  She stated she would try the new bed for a few minutes, but I assured her I would hold the other one for a little while just in case. 15:50 - Left  for wound care nurse regarding consult. Also, unit secretary to have [facility] pick bariatric bed up. [facility] placed back in patient's room per her request.  17:45 - Patient is again angry that her food has not arrived when she requested it at 5:00pm.  She spoke with dietary personnel over the phone.           Admission Date 9/22/2017   Admission Diagnosis SOB (shortness of breath)  Shortness of breath Consults IP CONSULT TO HOSPITALIST  IP CONSULT TO PULMONOLOGY        Consults   [] PT   [] OT   [] Speech   [] Palliative      [] Hospice    [x] Case Management   [] None   Cardiac Monitoring   [x] Yes   [] No     Antibiotics   [x] Yes   [] No   GI Prophylaxis  (Ex: Protonix, Pepcid, etc,.)   [] Yes   [x] No          DVT Prophylaxis   SCDs:             Haroon stockings:         [x] Medication (Ex: Lovenox, Eliquis, Brilinta, Coumadin,  Heparin, etc..)   [] Contraindicated   [] No VTE needed       Urinary Catheter             LDAs               Peripheral IV 09/22/17 Right Antecubital (Active)   Site Assessment Clean, dry, & intact 9/25/2017  8:13 AM   Phlebitis Assessment 0 9/25/2017  8:13 AM   Infiltration Assessment 0 9/25/2017  8:13 AM   Dressing Status Clean, dry, & intact 9/25/2017  8:13 AM   Dressing Type Tape;Transparent 9/25/2017  8:13 AM   Hub Color/Line Status Pink;Capped 9/25/2017  8:13 AM                      I/Os   Intake/Output Summary (Last 24 hours) at 09/25/17 0950  Last data filed at 09/25/17 0400   Gross per 24 hour   Intake              600 ml   Output              350 ml   Net              250 ml         Activity Level Activity Level: Up with Assistance     Activity Assistance: Partial (one person)   Diet Active Orders   Diet    DIET REGULAR      Purposeful Rounding every 1-2 hour? [x] Yes    Yu Score  Total Score: 3   Bed Alarm (If score 3 or >)   [] Yes    [] Refused (See signed refusal form in chart)   Jeffrey Score  Jeffrey Score: 17       Jeffrey Score (if score 14 or less)   [] PMT consult   [] Nutrition consult   [x] Wound Care consult      [x]  Specialty bed         Influenza Vaccine Received Flu Vaccine for Current Season (usually Sept-March): No    Patient/Guardian Refused (Notify MD): Yes          Needs prior to discharge:   Home O2 required:    [x] Yes   [] No     If yes, how much O2 required?     Other:    Last Bowel Movement Date: 09/25/17   Readmission Risk Assessment Tool Score High Risk            22       Total Score        3 Has Seen PCP in Last 6 Months (Yes=3, No=0)    4 IP Visits Last 12 Months (1-3=4, 4=9, >4=11)    9 Pt. Coverage (Medicare=5 , Medicaid, or Self-Pay=4)    6 Charlson Comorbidity Score (Age + Comorbid Conditions)        Criteria that do not apply:    . Living with Significant Other. Assisted Living. LTAC. SNF.  or   Rehab    Patient Length of Stay (>5 days = 3)       Expected Length of Stay - - -   Actual Length of Stay 1

## 2017-09-25 NOTE — PROGRESS NOTES
CM noted consult 9/22: Pt does not have CPAP anymore, may benefit from trilogy machine, would need Rx from Pulmonary. CM noted consult 9/24: needs assistance with disposition home and New CPAP Machine. CM just completed chart review and plan on completing new evaluation today to assess needs at home. Pt has Medicare and Medicaid. CM first called Dr. Randy Minor office to help with the CPAP machine. Pt has not been seen by Dr. Edith Parry since 2015. Yazan Melvin from that office indicated that they would be a happy to have Dr. Edith Parry review the notes/consults for the hospital and try to assist in getting a new CPAP machine. Yazan Melvin indicated that [employer] [facility] was the company they were using and they have closed. Therefore, they could start the process with [facility] company. CM printed MD and Consult Notes and is faxing them to Dr. Randy Minor office now. CM read and reviewed NN Notes from [facility] that has been involved with her in the past.  CM sent her an inbasket message to let her know that she is in the hospital again and so she can follow her after discharged. Providers that are involved per NN note in chart from 7/31/17:    Dr Lit Song, [facility] [phone]  [facility] Patient for Oxygen [phone]  [facility] [address] [facility] [phone]    3:12 PM   CM met with Pt in her room to complete initial assessment. CM reviewed demographic information and discharge planner role. Pt indicated that she is seen by Dr. Yaakov Vargas, [facility]. She indicated that on the day that she was admitted she called the office and they had indicated that MD would try to come out to see her today in the afternoon. Pt waited until 1:30 PM and then decided to call the 911.   Pt indicated that it is very hard for her to get out to MD appointments for follow up because of her oxygen and she indicated that she was told that the would have to pay her aides privately for MD appointment if her time runs over because of [facility] transport. Pt indicated that she lives alone in a one story home with 5 NIKOS that she rents. Her son comes by from time to time but is 32 yr old and she stated he was busy. Pt indicated that she does have oxygen at home through [facility]. She is currently on 5 LPM.  Pt owns a Hospital Bed and [facility]. Pt no longer has a CPAP. [address] came and picked it up. Pt claimed that it was broken. Pt stated that she now has private duty care aides from [phone] AM to 130 PM Monday through Friday through [address]. Pt has Medicare and Medicaid. Pt will be transported home by [address] ambulance. Pt requested a large stretcher as Pt is 5'6\" and 405 lbs. Pt requested a copy of the [facility] [address]. CM gave her that listing. Pt understands that she would have to talk to DSS to make any changes with her care or caregivers. Pt indicated that La Chau with [facility] helps with scheduling. CM called La [place]au to discuss this case but had to leave a message as she was on another line. Pt was being seen by [facility] but they have stopped providing [facility] RN to her. Pt was BID wound care, Pt had no caregiver to teach the wound care therefore they were unable to keep providing that service at that frequency and duration. If the Pt needs wound care at home when she is ready to discharge CM offered FOC. 1st choice is [facility]. 2nd choice is [facility]. 3rd choice is [facility]. CM reviewed that in her chart it was documented that she would want her MPOA to be her ex sister in law Gianna Howell. CM asked her if this had ever been documented anywhere and Pt indicated no. CM explained that the  her can help her with advanced directives so that if she is unable to make decisions that someone is designated to make those decisions for her. Pt understood.   QUINN called  office at [phone] and  indicated that someone would come tomorrow morning to help her with that. Rao Noyola phone number is [phone]. Pt has been to [facility] in the past.      Pt indicated that she needs A for ADLs and IADLs. She is able to bath her face and put on her shirt and feed herself. Aides help with bathing everyday, meal prep, cleaning, dressing and shopping. Pt does not leave her home except to come to the hospital.      Pt preferred pharmacy is [facility]. CM concerns:    Finding [facility] agency to help with wound care as Pt currently does not have an agency to help. BS Wound Care at the hospital has been consulted to make recommendations. If Pt does need wound care [facility] please put in order so that I can start checking with agencies to see if they will accept her case. Pt has not had a CPAP at home for over two years and RN indicated that she did not wear it long last night. Does she need a CPAP? Can Dr. Nancy Limon come to the hospital as I don't see Pt following up with any outpatient visits? Pt has a RAT Score over 21. Would she benefit from a Palliative Care Consult? 4:27 PM  Ori Fly from [address] called me back and she indicated that Pt has just been approved for more hours 930-330 PM 7 days a week. Pt can have an aide to help with MD appointments and Rick Rojo can help transport her there. Pt just does not like to transport because she has to wait for AMBULANCE transport to come back and pick her up after the appointment. CM will continue to monitor discharge plan. Care Management Interventions  PCP Verified by CM:  Yes (Dr. Melanie Chopra does home vists with her once a month. )  Mode of Transport at Discharge: BLS (Pt will need Large Stretcher set up with [facility].  )  Transition of Care Consult (CM Consult): Discharge Planning  Discharge Durable Medical Equipment: No  Physical Therapy Consult: No  Occupational Therapy Consult: No  Current Support Network: Lives Alone, Own Home, Has Personal Caregivers (Pt lives alone in one story home with 5 NIKOS.   [facility] aides 7D  a week from 930 AM to 130 PM. )  Confirm Follow Up Transport: Other (see comment) ([facility])  Discharge Location  Discharge Placement:  (TBD)    Onelia Villegas, [address] [place] [address]

## 2017-09-25 NOTE — PROGRESS NOTES
Problem: Falls - Risk of  Goal: *Absence of Falls  Document Yu Fall Risk and appropriate interventions in the flowsheet.    Outcome: Progressing Towards Goal  Fall Risk Interventions:  Mobility Interventions: Communicate number of staff needed for ambulation/transfer, Strengthening exercises (ROM-active/passive), Utilize walker, cane, or other assitive device           Medication Interventions: Evaluate medications/consider consulting pharmacy, Patient to call before getting OOB, Teach patient to arise slowly     Elimination Interventions: Call light in reach, Elevated toilet seat, Patient to call for help with toileting needs, Toilet paper/wipes in reach, Toileting schedule/hourly rounds

## 2017-09-26 LAB
ANION GAP SERPL CALC-SCNC: 6 MMOL/L (ref 5–15)
BASOPHILS # BLD: 0 K/UL (ref 0–0.1)
BASOPHILS NFR BLD: 0 % (ref 0–1)
BUN SERPL-MCNC: 16 MG/DL (ref 6–20)
BUN/CREAT SERPL: 23 (ref 12–20)
CALCIUM SERPL-MCNC: 10.5 MG/DL (ref 8.5–10.1)
CHLORIDE SERPL-SCNC: 102 MMOL/L (ref 97–108)
CO2 SERPL-SCNC: 30 MMOL/L (ref 21–32)
CREAT SERPL-MCNC: 0.69 MG/DL (ref 0.55–1.02)
EOSINOPHIL # BLD: 0.1 K/UL (ref 0–0.4)
EOSINOPHIL NFR BLD: 1 % (ref 0–7)
ERYTHROCYTE [DISTWIDTH] IN BLOOD BY AUTOMATED COUNT: 14.7 % (ref 11.5–14.5)
GLUCOSE BLD STRIP.AUTO-MCNC: 114 MG/DL (ref 65–100)
GLUCOSE BLD STRIP.AUTO-MCNC: 153 MG/DL (ref 65–100)
GLUCOSE BLD STRIP.AUTO-MCNC: 188 MG/DL (ref 65–100)
GLUCOSE BLD STRIP.AUTO-MCNC: 98 MG/DL (ref 65–100)
GLUCOSE SERPL-MCNC: 103 MG/DL (ref 65–100)
HCT VFR BLD AUTO: 39 % (ref 35–47)
HGB BLD-MCNC: 12.3 G/DL (ref 11.5–16)
LYMPHOCYTES # BLD: 1.8 K/UL (ref 0.8–3.5)
LYMPHOCYTES NFR BLD: 14 % (ref 12–49)
MAGNESIUM SERPL-MCNC: 2 MG/DL (ref 1.6–2.4)
MCH RBC QN AUTO: 29.5 PG (ref 26–34)
MCHC RBC AUTO-ENTMCNC: 31.5 G/DL (ref 30–36.5)
MCV RBC AUTO: 93.5 FL (ref 80–99)
MONOCYTES # BLD: 0.9 K/UL (ref 0–1)
MONOCYTES NFR BLD: 7 % (ref 5–13)
NEUTS SEG # BLD: 9.6 K/UL (ref 1.8–8)
NEUTS SEG NFR BLD: 78 % (ref 32–75)
PLATELET # BLD AUTO: 232 K/UL (ref 150–400)
POTASSIUM SERPL-SCNC: 4.1 MMOL/L (ref 3.5–5.1)
RBC # BLD AUTO: 4.17 M/UL (ref 3.8–5.2)
SERVICE CMNT-IMP: ABNORMAL
SERVICE CMNT-IMP: NORMAL
SODIUM SERPL-SCNC: 138 MMOL/L (ref 136–145)
WBC # BLD AUTO: 12.4 K/UL (ref 3.6–11)

## 2017-09-26 PROCEDURE — 74011250636 HC RX REV CODE- 250/636: Performed by: INTERNAL MEDICINE

## 2017-09-26 PROCEDURE — 74011000250 HC RX REV CODE- 250: Performed by: INTERNAL MEDICINE

## 2017-09-26 PROCEDURE — 82962 GLUCOSE BLOOD TEST: CPT

## 2017-09-26 PROCEDURE — 65660000000 HC RM CCU STEPDOWN

## 2017-09-26 PROCEDURE — 77030011255 HC DSG AQUACEL AG BMS -A

## 2017-09-26 PROCEDURE — 94640 AIRWAY INHALATION TREATMENT: CPT

## 2017-09-26 PROCEDURE — 77010033678 HC OXYGEN DAILY

## 2017-09-26 PROCEDURE — 94660 CPAP INITIATION&MGMT: CPT

## 2017-09-26 PROCEDURE — 74011250637 HC RX REV CODE- 250/637: Performed by: NURSE PRACTITIONER

## 2017-09-26 PROCEDURE — 97161 PT EVAL LOW COMPLEX 20 MIN: CPT | Performed by: PHYSICAL THERAPIST

## 2017-09-26 PROCEDURE — 85025 COMPLETE CBC W/AUTO DIFF WBC: CPT | Performed by: INTERNAL MEDICINE

## 2017-09-26 PROCEDURE — 74011636637 HC RX REV CODE- 636/637: Performed by: INTERNAL MEDICINE

## 2017-09-26 PROCEDURE — 36415 COLL VENOUS BLD VENIPUNCTURE: CPT | Performed by: INTERNAL MEDICINE

## 2017-09-26 PROCEDURE — 80048 BASIC METABOLIC PNL TOTAL CA: CPT | Performed by: INTERNAL MEDICINE

## 2017-09-26 PROCEDURE — 74011250637 HC RX REV CODE- 250/637: Performed by: INTERNAL MEDICINE

## 2017-09-26 PROCEDURE — 97116 GAIT TRAINING THERAPY: CPT | Performed by: PHYSICAL THERAPIST

## 2017-09-26 PROCEDURE — 83735 ASSAY OF MAGNESIUM: CPT | Performed by: INTERNAL MEDICINE

## 2017-09-26 PROCEDURE — 74011250636 HC RX REV CODE- 250/636: Performed by: EMERGENCY MEDICINE

## 2017-09-26 RX ORDER — BENZONATATE 100 MG/1
100 CAPSULE ORAL 3 TIMES DAILY
Status: DISCONTINUED | OUTPATIENT
Start: 2017-09-26 | End: 2017-09-27 | Stop reason: HOSPADM

## 2017-09-26 RX ORDER — IPRATROPIUM BROMIDE AND ALBUTEROL SULFATE 2.5; .5 MG/3ML; MG/3ML
3 SOLUTION RESPIRATORY (INHALATION)
Status: DISCONTINUED | OUTPATIENT
Start: 2017-09-26 | End: 2017-09-27 | Stop reason: HOSPADM

## 2017-09-26 RX ORDER — GUAIFENESIN 600 MG/1
600 TABLET, EXTENDED RELEASE ORAL EVERY 12 HOURS
Status: DISCONTINUED | OUTPATIENT
Start: 2017-09-26 | End: 2017-09-27 | Stop reason: HOSPADM

## 2017-09-26 RX ORDER — AMMONIUM LACTATE 12 G/100G
LOTION TOPICAL 2 TIMES DAILY
Status: DISCONTINUED | OUTPATIENT
Start: 2017-09-26 | End: 2017-09-27 | Stop reason: HOSPADM

## 2017-09-26 RX ADMIN — MICONAZOLE NITRATE: 20 CREAM TOPICAL at 17:24

## 2017-09-26 RX ADMIN — GUAIFENESIN 600 MG: 600 TABLET, EXTENDED RELEASE ORAL at 21:00

## 2017-09-26 RX ADMIN — FLUTICASONE PROPIONATE 2 SPRAY: 50 SPRAY, METERED NASAL at 10:11

## 2017-09-26 RX ADMIN — METOPROLOL TARTRATE 25 MG: 25 TABLET ORAL at 10:14

## 2017-09-26 RX ADMIN — FUROSEMIDE 40 MG: 40 TABLET ORAL at 10:15

## 2017-09-26 RX ADMIN — Medication: at 17:24

## 2017-09-26 RX ADMIN — SODIUM HYPOCHLORITE: 1.25 SOLUTION TOPICAL at 17:32

## 2017-09-26 RX ADMIN — Medication: at 17:10

## 2017-09-26 RX ADMIN — BENZONATATE 100 MG: 100 CAPSULE ORAL at 17:09

## 2017-09-26 RX ADMIN — CYCLOBENZAPRINE HYDROCHLORIDE 5 MG: 10 TABLET, FILM COATED ORAL at 10:13

## 2017-09-26 RX ADMIN — GUAIFENESIN 600 MG: 600 TABLET, EXTENDED RELEASE ORAL at 12:45

## 2017-09-26 RX ADMIN — IPRATROPIUM BROMIDE AND ALBUTEROL SULFATE 3 ML: .5; 3 SOLUTION RESPIRATORY (INHALATION) at 07:41

## 2017-09-26 RX ADMIN — BENZONATATE 100 MG: 100 CAPSULE ORAL at 21:00

## 2017-09-26 RX ADMIN — PREDNISONE 40 MG: 20 TABLET ORAL at 10:12

## 2017-09-26 RX ADMIN — ENOXAPARIN SODIUM 40 MG: 40 INJECTION SUBCUTANEOUS at 21:00

## 2017-09-26 RX ADMIN — PRAVASTATIN SODIUM 20 MG: 40 TABLET ORAL at 21:00

## 2017-09-26 RX ADMIN — IPRATROPIUM BROMIDE AND ALBUTEROL SULFATE 3 ML: .5; 3 SOLUTION RESPIRATORY (INHALATION) at 20:36

## 2017-09-26 RX ADMIN — ASPIRIN 81 MG: 81 TABLET, COATED ORAL at 10:12

## 2017-09-26 RX ADMIN — SODIUM HYPOCHLORITE: 1.25 SOLUTION TOPICAL at 17:10

## 2017-09-26 RX ADMIN — ALBUTEROL SULFATE 2.5 MG: 2.5 SOLUTION RESPIRATORY (INHALATION) at 00:42

## 2017-09-26 RX ADMIN — METOPROLOL TARTRATE 25 MG: 25 TABLET ORAL at 17:47

## 2017-09-26 RX ADMIN — Medication 10 ML: at 17:33

## 2017-09-26 RX ADMIN — MICONAZOLE NITRATE: 20 CREAM TOPICAL at 21:00

## 2017-09-26 RX ADMIN — Medication 5 ML: at 06:00

## 2017-09-26 RX ADMIN — LISINOPRIL 40 MG: 20 TABLET ORAL at 10:14

## 2017-09-26 RX ADMIN — AZITHROMYCIN MONOHYDRATE 500 MG: 500 INJECTION, POWDER, LYOPHILIZED, FOR SOLUTION INTRAVENOUS at 21:00

## 2017-09-26 RX ADMIN — INSULIN LISPRO 2 UNITS: 100 INJECTION, SOLUTION INTRAVENOUS; SUBCUTANEOUS at 17:48

## 2017-09-26 RX ADMIN — Medication 10 ML: at 21:00

## 2017-09-26 RX ADMIN — ENOXAPARIN SODIUM 40 MG: 40 INJECTION SUBCUTANEOUS at 10:11

## 2017-09-26 RX ADMIN — IPRATROPIUM BROMIDE AND ALBUTEROL SULFATE 3 ML: .5; 3 SOLUTION RESPIRATORY (INHALATION) at 14:51

## 2017-09-26 RX ADMIN — ACETAMINOPHEN 325 MG: 325 TABLET ORAL at 10:15

## 2017-09-26 NOTE — CARDIO/PULMONARY
Cardiopulmonary Rehab Nursing Entry:     Chart reviewed. Pt is a 41 y.o. female admitted with Community acquired pneumonia       PMH includes CAD, HTN, diastolic CHF, DM, NSTEMI, COPD, and smoker. Last visited for HF teaching 5/26/15.     Pt has CHF folder at her bedside as provided by CR nursing staff. This was a follow-up visit to answer questions and reinforce prior teaching re: CHF, S&Ss, medication management, Low NA diet, daily weights and when to call the doctor. Patient states she has no scales at home and has no money to buy them. Tried to impress upon her the importance of daily weights to monitor fluid gain. Will check with  about providing her with some to take home. She is aware she needs to watch sodium intake, but has people provide her meals most of the time and has little input into what she is provided. Will continue to follow while in hospital.      Scales given to patient from Cardiac Rehab. Department.

## 2017-09-26 NOTE — PROGRESS NOTES
[address] SHIFT NURSING NOTE    Bedside shift change report given to Sveta Loyd RN (oncoming nurse) by Devi Vora RN (offgoing nurse). Report included the following information SBAR, Recent Results and Cardiac Rhythm NSR.    SHIFT SUMMARY:         Admission Date 9/22/2017   Admission Diagnosis SOB (shortness of breath)  Shortness of breath   Consults IP CONSULT TO PULMONOLOGY        Consults   [] PT   [] OT   [] Speech   [] Palliative      [] Hospice    [] Case Management   [] None   Cardiac Monitoring   [x] Yes   [] No     Antibiotics   [x] Yes   [] No   GI Prophylaxis  (Ex: Protonix, Pepcid, etc,.)   [x] Yes   [] No          DVT Prophylaxis   SCDs:             Haroon stockings:         [x] Medication (Ex: Lovenox, Eliquis, Brilinta, Coumadin,  Heparin, etc..)   [] Contraindicated   [] No VTE needed       Urinary Catheter             LDAs               Peripheral IV 09/22/17 Right Antecubital (Active)   Site Assessment Clean, dry, & intact 9/26/2017  3:15 PM   Phlebitis Assessment 0 9/26/2017  3:15 PM   Infiltration Assessment 0 9/26/2017  3:15 PM   Dressing Status Clean, dry, & intact 9/26/2017  3:15 PM   Dressing Type Tape;Transparent 9/26/2017  3:15 PM   Hub Color/Line Status Pink 9/26/2017  3:15 PM                      I/Os   Intake/Output Summary (Last 24 hours) at 09/26/17 1953  Last data filed at 09/26/17 1515   Gross per 24 hour   Intake              480 ml   Output              350 ml   Net              130 ml         Activity Level Activity Level: Up with Assistance     Activity Assistance: Partial (one person)   Diet Active Orders   Diet    DIET REGULAR      Purposeful Rounding every 1-2 hour?    [x] Yes    Yu Score  Total Score: 3   Bed Alarm (If score 3 or >)   [x] Yes    [] Refused (See signed refusal form in chart)   Jeffrey Score  Jeffrey Score: 17       Jeffrey Score (if score 14 or less)   [] PMT consult   [] Nutrition consult   [x] Wound Care consult      [x]  Specialty bed         Influenza Vaccine Received Flu Vaccine for Current Season (usually Sept-March): No    Patient/Guardian Refused (Notify MD): Yes          Needs prior to discharge:   Home O2 required:    [x] Yes   [] No     If yes, how much O2 required? Other:    Last Bowel Movement Date: 09/26/17   Readmission Risk Assessment Tool Score High Risk            22       Total Score        3 Has Seen PCP in Last 6 Months (Yes=3, No=0)    4 IP Visits Last 12 Months (1-3=4, 4=9, >4=11)    9 Pt. Coverage (Medicare=5 , Medicaid, or Self-Pay=4)    6 Charlson Comorbidity Score (Age + Comorbid Conditions)        Criteria that do not apply:    . Living with Significant Other. Assisted Living. LTAC. SNF.  or   Rehab    Patient Length of Stay (>5 days = 3)       Expected Length of Stay 4d 14h   Actual Length of Stay 2

## 2017-09-26 NOTE — WOUND CARE
Wound care Patient is a 401 lb, 5'6\" 40 y/o AAF with lymphedema to BLE and a foul smelling weeping left posterior LE area. Admitted for SOB and with   Past Medical History:   Diagnosis Date    Arthritis     Asthma     CAD (coronary artery disease)     [facility]     COPD (chronic obstructive pulmonary disease) [facility]     Diabetes ([facility]     Hypertension     Morbid obesity with BMI of 70 and over, adult ([facility]     NSTEMI (non-ST elevated myocardial infarction) ([facility]     SVT (supraventricular tachycardia) ([facility]      Patient refused [facility] bariatric bed ordered yesterday, for numerous reasons, but now ordered a Bariatric bed from [facility] that is foam and low enough to ground for her to get in and out of. Patient did a lot of complaining when I was in the room, complained about bed, c/o food, c/o her HHC, c/o of WC waiting so long to see her. Patients buttocks are skin intact, no reddness, staff using EPC cream.  BLE and BL feet: heavy, thick scaly skin from chronic lymphedema and poor hygiene from being unable to reach legs. LLE posterior has weeping, foul smelling skin - no actual wound, measured area of skin:  WOUND POA CONDITIONS    Wound Leg Left; Lower; Posterior (Active)   DRESSING STATUS Removed 9/26/2017 10:15 AM   DRESSING TYPE Dry dressing 9/26/2017 10:15 AM   Non-Pressure Injury Partial thickness (epider/derm) 9/26/2017 10:15 AM   Wound Length (cm) 10 cm 9/26/2017 10:15 AM   Wound Width (cm) 15 cm 9/26/2017 10:15 AM   Wound Depth (cm) 0.1 9/26/2017 10:15 AM   Wound Surface area (cm^3) 15 cm^2 9/26/2017 10:15 AM   Condition of Base Other (comment) 9/26/2017 10:15 AM   Drainage Amount  Scant 9/26/2017 10:15 AM   Drainage Color Purulent 9/26/2017 10:15 AM   Wound Odor Foul 9/26/2017 10:15 AM   Periwound Skin Condition Other (comment) 9/26/2017 10:15 AM   Cleansing and Cleansing Agents  Soap and water 9/26/2017 10:15 AM   Dressing Type Applied Silver products;Dry dressing 9/26/2017 10:15 AM   Procedure Tolerated Well 9/26/2017 10:15 AM   Number of days:3           WC nurse cleansed LLE with CHG soap and water, covered weeping area with Aquacell-AG and then a dry dressing. Recommend as follows:  LLE posterior weeping area: cleanse area and skin folds with 1/4 strength Dakins Solution. Apply Lac Hydrin lotion to thick, anterior leg kin and feet. Apply Dakins moist 4x4's to weeping area, then ABD pads and secure with julian. BLE and feet thick skin: place chuxs or towels under legs and wash skin with CHG soap and water daily. Make sure to cleanse and dry skin folds and between toes! Apply thick amount of Lac-Hydrin loition BID to thick, crusty skin. [facility] ordered - [employer] bed. Please call [facility] for pick-up upon discharge [phone]  Plan: Wound Care for LLE posterior weeping skin and BLE and feet skin should switch to M-W-F wound care as follows: cleanse with 1/4 Dakins solution and wash BLE with soap and water. Apply lac-hydrin lotion to BLE and feet skin. Cover weeping area with Aquacell=AG silver hydrafiber and wrap with dry dressing. Ultimately she needs to be seen at a lymphedema clinic [facility] or [place]), because this problem of her BLE is not going to go away with basic wound care.   Roopa Daniel RN, CWON, zone ph# [phone]

## 2017-09-26 NOTE — PROGRESS NOTES
Hospitalist Progress Note    NAME: Patricia Bains   :  1970   MRN:  [id number]       Interim Hospital Summary: 41 y.o. female whom presented on 2017 with      Assessment / Plan:  Multifocal Community acquired pneumonia   Chronic hypoxic and hypercapneic respiratory failure in setting of OHS  COPD exacerbation  - on baseline O2 of 5-6 L.    - Pulmonology following: Dr. Sheri Templeton ordered home settings for her BiPAP. Recommend to follow up with Dr. Georgette Ponce and Dr. Lety Muro upon discharge  - CT chest Multifocal patchy airspace disease  - continue ceftriaxone and azithromycin  - continue prednisone taper. Nebs prn  - CM consult     CAD / HTN  Chronic diastolic HF  - continue with lasix, metoprolol, & lisinopril     Chronic LE lymphedema / venous stasis POA  - pt unable to go lymphemia clinic due to transportation issue, limited family support. - appreciate wound care team input:  Wound Care for LLE posterior weeping skin and BLE and feet skin should switch to M-W-F wound care as follows: cleanse with 1/4 Dakins solution and wash BLE with soap and water. Apply lac-hydrin lotion to BLE and feet skin. Cover weeping area with Aquacell=AG silver hydrafiber and wrap with dry dressing. Ultimately she needs to be seen at a lymphedema clinic [facility] or [facility]), because this problem of her BLE is not going to go away with basic wound care. - CM consult     DM  - SSI, hold home PO meds  - Hgb A1C (2017): 5.3     Candida intertrigo  Severe Obesity  - Body mass index is 71.82 kg/(m^2).           Code Status: full  Surrogate Decision Maker: son      DVT Prophylaxis: lovenox  GI Prophylaxis: not indicated      Baseline: seems relatively home bound due to comorbid conditions    Recommended Disposition:  PT, OT, RN     Subjective:     Chief Complaint / Reason for Physician Visit  \"I need some help when I get home\". Discussed with RN events overnight.      Review of Systems:  Symptom Y/N Comments  Symptom Y/N Comments Fever/Chills n   Chest Pain n    Poor Appetite    Edema y    Cough y   Abdominal Pain     Sputum n   Joint Pain     SOB/RAZO y   Pruritis/Rash     Nausea/vomit n   Tolerating PT/OT     Diarrhea n   Tolerating Diet     Constipation n   Other       Could NOT obtain due to:      Objective:     VITALS:   Last 24hrs VS reviewed since prior progress note. Most recent are:  Patient Vitals for the past 24 hrs:   Temp Pulse Resp BP SpO2   09/26/17 1451 - - - - 98 %   09/26/17 1110 98.4 °F (36.9 °C) 80 18 115/72 98 %   09/26/17 0753 98.1 °F (36.7 °C) 83 18 133/83 92 %   09/26/17 0741 - - - - 92 %   09/26/17 0525 - 81 - 121/76 -   09/26/17 0501 98.9 °F (37.2 °C) 85 18 92/63 95 %   09/26/17 0430 - - - - 93 %   09/26/17 0109 97.5 °F (36.4 °C) 80 18 96/64 90 %   09/25/17 2030 98.2 °F (36.8 °C) 77 18 119/63 96 %   09/25/17 2025 - - - - 98 %       Intake/Output Summary (Last 24 hours) at 09/26/17 1730  Last data filed at 09/26/17 1339   Gross per 24 hour   Intake              240 ml   Output             1150 ml   Net             -910 ml        PHYSICAL EXAM:  General: WD, WN. Alert, cooperative, no acute distress    EENT:  EOMI. Anicteric sclerae. MMM  Resp:  Coarse breath sounds in apex with a few exp wheezing at base.   No accessory muscle use  CV:  Regular  rhythm,  chronic edema  GI:  Soft, obese, Non tender.  +Bowel sounds  Neurologic:  Alert and oriented X 3, normal speech,   Psych:   Fair insight. Not anxious nor agitated  Skin:  LLE; weeping area in posterior region, thick skin BLE and both feet,  No jaundice    Reviewed most current lab test results and cultures  YES  Reviewed most current radiology test results   YES  Review and summation of old records today    NO  Reviewed patient's current orders and MAR    YES  PMH/SH reviewed - no change compared to H&P  ________________________________________________________________________  Care Plan discussed with:    Comments   Patient y    Family      RN y    Care Manager y Consultant                       y Multidiciplinary team rounds were held today with , nursing, pharmacist and clinical coordinator. Patient's plan of care was discussed; medications were reviewed and discharge planning was addressed. ________________________________________________________________________  Total NON critical care TIME:  30  Minutes    Total CRITICAL CARE TIME Spent:   Minutes non procedure based      Comments   >50% of visit spent in counseling and coordination of care     ________________________________________________________________________  Nora Traore NP     Procedures: see electronic medical records for all procedures/Xrays and details which were not copied into this note but were reviewed prior to creation of Plan. LABS:  I reviewed today's most current labs and imaging studies.   Pertinent labs include:  Recent Labs      09/26/17   0421  09/25/17   0028  09/24/17   0402   WBC  12.4*  14.7*  13.6*   HGB  12.3  11.8  11.9   HCT  39.0  37.9  39.0   PLT  232  261  216     Recent Labs      09/26/17   0421  09/25/17   0105  09/24/17   0402   NA  138  134*  139   K  4.1  4.6  4.3   CL  102  102  104   CO2  30  27  31   GLU  103*  214*  98   BUN  16  17  14   CREA  0.69  0.73  0.64   CA  10.5*  9.6  10.0   MG  2.0   --    --        Signed: )Astrid Cortez NP

## 2017-09-26 NOTE — PROGRESS NOTES
[facility]  Pulmonary, Critical Care, and Sleep Medicine    Patient Consult    Name: Louana Primrose MRN: [id number]   : 1970 Hospital: [facility]   Date: 2017        IMPRESSION:   · Chronic hypoxic/hypercapnic respiratory failure  · GEN/OHS - followed by Dr. Claudia nicole She will need to arrange follow up upon discharge. · COPD with exacerbation - follows with Dr. Grisel Jones  · \"pneumonia\" with minimal GGOs on CT - could easily be pulmonary edema but due to her size and echo is not useful due to poor acoustic window. · Atypical pain along her left chest, soft tissue. · Debility. · Morbid obesity. · Chronic Lower extremity edema. Pt is not able to care for herself. · CAD  · HTN  · DM  · Tobacco use, smoking until time of admission. · Discussed with Dr. Toño Russell this am.       RECOMMENDATIONS:   · I will order her home settings for her BiPAP as was prescribed by Peri nicole for use while in the hospital  · Recommend contacting her sleep physician, Dr. Figueroa Zarate, for orders for outpatient machine upon discharge. · Continue antibiotics   · Schedule bronchdilators  · Tobacco cessation counseling  · PT and OT. · Recommend following up with Dr. Figueroa Zarate and Dr. Grisel Jones after discharge  · May benefit from an Assisted liver facility. · Would give prednisone taper over 10 days. · Pt appears near her baseline from a pulmonary perspective. Will sign off and be available as needed. Please call if questions or issues. Subjective:     Last 24 hrs: Pt feels a little better. Still with some pain over the left side of her body. Tolerating po intake well. This patient has been seen and evaluated at the request of Dr. Marquita Bond for \"needs CPAP\". Patient is a 41 y.o. female with GEN/OHS, followed by Dr. Figueroa Zarate for her GEN, admitted for pneumonia. She still feels poorly from her pneumonia and continues on antibiotics for that.   I was asked to see her to order her nightly CPAP. Her machine was taken away several months ago, possibly for insurance reasons. She does not recall her [employer] company, but she thinks it may be [employer]. She is still feeling poorly from her pneumonia with ongoing dyspnea. Past Medical History:   Diagnosis Date    Arthritis     Asthma     CAD (coronary artery disease)     Congestive heart failure (HCC)     [facility]     [facility]     Hypertension     Morbid obesity with BMI of 70 and over, adult ([facility]     NSTEMI (non-ST elevated myocardial infarction) ([facility]     SVT (supraventricular tachycardia) ([facility]       Past Surgical History:   Procedure Laterality Date    CARDIAC SURG PROCEDURE UNLIST      Stents    HX  SECTION      HX ORTHOPAEDIC      HX OTHER SURGICAL      cyst removed from back      Prior to Admission medications    Medication Sig Start Date End Date Taking? Authorizing Provider   fluticasone (FLONASE) 50 mcg/actuation nasal spray 2 Sprays by Both Nostrils route daily. 17   Nesha Gallardo MD   nystatin (MYCOSTATIN) topical cream Apply  to affected area two (2) times a day. 17   Nesha Gallardo MD   HYDROcodone-acetaminophen (NORCO) 5-325 mg per tablet Take 1 Tab by mouth every four (4) hours as needed for Pain. Max Daily Amount: 6 Tabs. 17   Michael Cavazos MD   l.acidoph-B.lactis-B.longum [employer] [facility]) 460 mg (7.5-6- 1.5 bill. cell) cap cap Take 1 Cap by mouth Daily (before breakfast). 17   Michael Cavazos MD   albuterol (PROVENTIL VENTOLIN) 2.5 mg /3 mL (0.083 %) nebulizer solution 3 mL by Nebulization route every four (4) hours as needed for Wheezing. 17   Evette Garner MD   furosemide (LASIX) 40 mg tablet Take 40 mg by mouth daily. Landon Raza MD   lovastatin (MEVACOR) 40 mg tablet Take 1 Tab by mouth nightly.  16   Madhav Redmond NP   clotrimazole (LOTRIMIN) 1 % topical cream Apply to the affected areas twice daily until healed 1/13/16   Melba Connor MD   lisinopril (PRINIVIL, ZESTRIL) 40 mg tablet Take 40 mg by mouth daily. Historical Provider   glyBURIDE (DIABETA) 5 mg tablet Take 5 mg by mouth Daily (before breakfast). Historical Provider   cetirizine (ZYRTEC) 10 mg tablet Take 10 mg by mouth daily as needed for Allergies. Historical Provider   medroxyPROGESTERone (DEPO-PROVERA) 150 mg/mL injection 150 mg. 5/1/15   Phys Other, MD   omeprazole (PRILOSEC) 40 mg capsule Take 40 mg by mouth daily. 7/18/15   Phys Other, MD   ketoconazole (NIZORAL) 2 % topical cream Apply  to affected area daily. Historical Provider   ammonium lactate (LAC-HYDRIN) 12 % topical cream rub in to affected area well 11/21/14   Amish Martinez MD   aspirin delayed-release 81 mg tablet Take 81 mg by mouth daily. Historical Provider   metoprolol (LOPRESSOR) 25 mg tablet Take 25 mg by mouth two (2) times a day. Historical Provider   nitroglycerin (NITROSTAT) 0.4 mg SL tablet 1 Tab by SubLINGual route every five (5) minutes as needed for Chest Pain (call 911 if not relieved by 3). 9/12/13   Goran Dover NP   albuterol (PROVENTIL, VENTOLIN) 90 mcg/actuation inhaler Take 1-2 Puffs by inhalation every four (4) hours as needed for Wheezing.  9/10/13   Bruce Avila MD     No Known Allergies   Social History   Substance Use Topics    Smoking status: Current Every Day Smoker     Packs/day: 0.25     Types: Cigarettes    Smokeless tobacco: Never Used    Alcohol use No      Family History   Problem Relation Age of Onset    Heart Disease Mother     Heart Disease Father     Heart Disease Sister         Current Facility-Administered Medications   Medication Dose Route Frequency    albuterol-ipratropium (DUO-NEB) 2.5 MG-0.5 MG/3 ML  3 mL Nebulization Q6HWA RT    sodium hypochlorite (QUARTER STRENGTH DAKIN'S) 0.125% irrigation (bottle)   Topical BID    ammonium lactate (LAC-HYDRIN) 12 % lotion   Topical BID    enoxaparin (LOVENOX) injection 40 mg  40 mg SubCUTAneous Q12H    miconazole (MICOTIN) 2 % cream   Topical Q12H    azithromycin (ZITHROMAX) 500 mg in 0.9% sodium chloride (MBP/ADV) 250 mL  500 mg IntraVENous Q24H    aspirin delayed-release tablet 81 mg  81 mg Oral DAILY    fluticasone (FLONASE) 50 mcg/actuation nasal spray 2 Spray  2 Spray Both Nostrils DAILY    furosemide (LASIX) tablet 40 mg  40 mg Oral DAILY    lisinopril (PRINIVIL, ZESTRIL) tablet 40 mg  40 mg Oral DAILY    pravastatin (PRAVACHOL) tablet 20 mg  20 mg Oral QHS    metoprolol tartrate (LOPRESSOR) tablet 25 mg  25 mg Oral BID    predniSONE (DELTASONE) tablet 40 mg  40 mg Oral DAILY WITH BREAKFAST    sodium chloride (NS) flush 5-10 mL  5-10 mL IntraVENous Q8H    insulin lispro (HUMALOG) injection   SubCUTAneous AC&HS       Review of Systems:  A comprehensive review of systems was negative except for that written in the HPI. Objective:   Vital Signs:    Visit Vitals    /72 (BP 1 Location: Left arm, BP Patient Position: At rest)    Pulse 80    Temp 98.4 °F (36.9 °C)    Resp 18    Wt (!) 182.1 kg (401 lb 7.3 oz)    SpO2 98%    BMI 64.8 kg/m2       O2 Device: Nasal cannula   O2 Flow Rate (L/min): 5 l/min   Temp (24hrs), Av.1 °F (36.7 °C), Min:97.5 °F (36.4 °C), Max:98.9 °F (37.2 °C)       Intake/Output:   Last shift:         Last 3 shifts:  1901 -  0700  In: 600 [P.O.:100; I.V.:500]  Out: 800 [Urine:800]    Intake/Output Summary (Last 24 hours) at 17 1134  Last data filed at 17 1859   Gross per 24 hour   Intake                0 ml   Output              800 ml   Net             -800 ml      Physical Exam:   General:  Alert, cooperative, no distress, appears stated age, morbidly obese, Was sitting up in chair eating breakfast this am.    Head:  Normocephalic, without obvious abnormality, atraumatic. Eyes:  Conjunctivae/corneas clear. Nose: Nares normal. Septum midline.  Mucosa normal.     Throat: Lips, mucosa, and tongue normal.     Neck: Supple, symmetrical, trachea midline    Back:   Symmetric, no curvature. ROM normal.   Lungs:   Scattered rhonchi but was moving air pretty well. Chest wall:  No tenderness or deformity. Heart:  Regular rate and rhythm    Abdomen:   Soft, non-tender. Bowel sounds normal.    Extremities: Extremities normal, atraumatic, no cyanosis, +edema, appears to be chronic. Skin: Skin color, texture, turgor normal. No rashes or lesions   Lymph nodes: Cervical, supraclavicular nodes normal.   Neurologic: Grossly nonfocal     Data review:     Recent Results (from the past 24 hour(s))   GLUCOSE, POC    Collection Time: 09/25/17  4:08 PM   Result Value Ref Range    Glucose (POC) 227 (H) 65 - 100 mg/dL    Performed by Joy Closs (PCT)    GLUCOSE, POC    Collection Time: 09/25/17  8:19 PM   Result Value Ref Range    Glucose (POC) 237 (H) 65 - 100 mg/dL    Performed by Pan Gant    CBC WITH AUTOMATED DIFF    Collection Time: 09/26/17  4:21 AM   Result Value Ref Range    WBC 12.4 (H) 3.6 - 11.0 K/uL    RBC 4.17 3.80 - 5.20 M/uL    HGB 12.3 11.5 - 16.0 g/dL    HCT 39.0 35.0 - 47.0 %    MCV 93.5 80.0 - 99.0 FL    MCH 29.5 26.0 - 34.0 PG    MCHC 31.5 30.0 - 36.5 g/dL    RDW 14.7 (H) 11.5 - 14.5 %    PLATELET 630 735 - 861 K/uL    NEUTROPHILS 78 (H) 32 - 75 %    LYMPHOCYTES 14 12 - 49 %    MONOCYTES 7 5 - 13 %    EOSINOPHILS 1 0 - 7 %    BASOPHILS 0 0 - 1 %    ABS. NEUTROPHILS 9.6 (H) 1.8 - 8.0 K/UL    ABS. LYMPHOCYTES 1.8 0.8 - 3.5 K/UL    ABS. MONOCYTES 0.9 0.0 - 1.0 K/UL    ABS. EOSINOPHILS 0.1 0.0 - 0.4 K/UL    ABS.  BASOPHILS 0.0 0.0 - 0.1 K/UL   METABOLIC PANEL, BASIC    Collection Time: 09/26/17  4:21 AM   Result Value Ref Range    Sodium 138 136 - 145 mmol/L    Potassium 4.1 3.5 - 5.1 mmol/L    Chloride 102 97 - 108 mmol/L    CO2 30 21 - 32 mmol/L    Anion gap 6 5 - 15 mmol/L    Glucose 103 (H) 65 - 100 mg/dL    BUN 16 6 - 20 MG/DL    Creatinine 0.69 0.55 - 1.02 MG/DL    BUN/Creatinine ratio 23 (H) 12 - 20      GFR est AA >60 >60 ml/min/1.73m2    GFR est non-AA >60 >60 ml/min/1.73m2    Calcium 10.5 (H) 8.5 - 10.1 MG/DL   MAGNESIUM    Collection Time: 09/26/17  4:21 AM   Result Value Ref Range    Magnesium 2.0 1.6 - 2.4 mg/dL   GLUCOSE, POC    Collection Time: 09/26/17  7:58 AM   Result Value Ref Range    Glucose (POC) 98 65 - 100 mg/dL    Performed by [facility] [address] [facility] [place], POC    Collection Time: 09/26/17 11:30 AM   Result Value Ref Range    Glucose (POC) 114 (H) 65 - 100 mg/dL    Performed by Nusrat Ward        Imaging:  I have personally reviewed the patients radiographs and have reviewed the reports:  Mild emphysematous change, minimal GGOs        Miguel Tim MD

## 2017-09-26 NOTE — PROGRESS NOTES
Cardiopulmonary Care Interdisciplinary rounds were held today to discuss patient plan of care and outcomes. The following members were present: PT, NP/Physician, Pharmacy, Nursing, Nutritionist and Case Management.       Plan of Care: Continue current treatment plan  D/c polina w/ HH; refer to [facility] at d/c

## 2017-09-26 NOTE — PROGRESS NOTES
Problem: Falls - Risk of  Goal: *Absence of Falls  Document Yu Fall Risk and appropriate interventions in the flowsheet.    Outcome: Progressing Towards Goal  Fall Risk Interventions:  Mobility Interventions: Communicate number of staff needed for ambulation/transfer, OT consult for ADLs, Patient to call before getting OOB, PT Consult for mobility concerns, PT Consult for assist device competence           Medication Interventions: Bed/chair exit alarm, Evaluate medications/consider consulting pharmacy, Patient to call before getting OOB, Teach patient to arise slowly     Elimination Interventions: Call light in reach, Patient to call for help with toileting needs, Toileting schedule/hourly rounds, Toilet paper/wipes in reach

## 2017-09-26 NOTE — PROGRESS NOTES
Problem: Mobility Impaired (Adult and Pediatric)  Goal: *Acute Goals and Plan of Care (Insert Text)  Physical Therapy Goals  Initiated 9/26/2017  1. Patient will move from supine to sit and sit to supine in bed with modified independence within 7 day(s). 2. Patient will transfer from bed to chair and chair to bed with modified independence using the least restrictive device within 7 day(s). 3. Patient will perform sit to stand with modified independence within 7 day(s). 4. Patient will ambulate with modified independence for 40 feet with the least restrictive device within 7 day(s). 5. Patient will ascend/descend 5 stairs with 1 handrail(s) with modified independence within 7 day(s). PHYSICAL THERAPY EVALUATION  Patient: Elzbieta Woody (82 y.o. female)  Date: 9/26/2017  Primary Diagnosis: SOB (shortness of breath)  Shortness of breath        Precautions: morbid obesity,  Fall      ASSESSMENT :  Based on the objective data described below, the patient presents with decreased functional mobility from baseline level of function. Prior to admit patient was living alone in a 1 level home with approx 5 stairs to enter with rails. States she has an aide that comes daily but leaves at 3 PM.  She also uses 5 L O2 at baseline and is only able to amb approx 40 feet before needing to sit and rest.  Patient currently SBA with transfers and able to amb approx 10 feet x 2. Visibly SOB after ambulation on 5 LO2. No overt LOB during ambulation. Discussed with patient whether she was interested in having [facility] PT come to the house. States she \"had a bad experience\" with  PT and wasn't sure if she'd like to have them. Then states she'd be willing to have PT come as long as they were a different company than the ones providing her wound care. Explained that it normally doesn't work that way. She then states she would not want any PT because it would \"interfere with her wound care\".   Anticipate a  PT safety evaluation would be beneficial.  But if patient is adamant about not participating with PT it's likely she is not far from her baseline. Will follow up 1-2 more times to ensure safety with ambulation in the room. Patient will benefit from skilled intervention to address the above impairments. Patients rehabilitation potential is considered to be Guarded  Factors which may influence rehabilitation potential include:   [ ]         None noted  [ ]         Mental ability/status  [X]         Medical condition  [ ]         Home/family situation and support systems  [ ]         Safety awareness  [ ]         Pain tolerance/management  [ ]         Other:        PLAN :  Recommendations and Planned Interventions:  [X]           Bed Mobility Training             [ ]    Neuromuscular Re-Education  [X]           Transfer Training                   [ ]    Orthotic/Prosthetic Training  [X]           Gait Training                         [ ]    Modalities  [X]           Therapeutic Exercises           [ ]    Edema Management/Control  [X]           Therapeutic Activities            [X]    Patient and Family Training/Education  [ ]           Other (comment):     Frequency/Duration: Patient will be followed by physical therapy  3 times a week to address goals. Discharge Recommendations: Home Health vs none  Further Equipment Recommendations for Discharge: None-states she owns a [facility] and is unable to fit a walker in her home       SUBJECTIVE:   Patient stated I'll be blunt with you. My house is small.   I cannot use anything like a walker in there      OBJECTIVE DATA SUMMARY:   HISTORY:    Past Medical History:   Diagnosis Date    Arthritis      Asthma      CAD (coronary artery disease)      [facility]      COPD (chronic obstructive pulmonary disease) [facility]      Diabetes ([facility]      Hypertension      Morbid obesity with BMI of 70 and over, adult ([facility]      NSTEMI (non-ST elevated myocardial infarction) ([facility]  SVT (supraventricular tachycardia) (HCC)       Past Surgical History:   Procedure Laterality Date    CARDIAC SURG PROCEDURE UNLIST         Stents    HX  SECTION        HX ORTHOPAEDIC        HX OTHER SURGICAL         cyst removed from back     Prior Level of Function/Home Situation: Independent with functional mobility. Needs assist with ADL's and self care secondary to body habitus. Has aide that comes daily but leaves at 3 PM.  States she doesn't leave the house much and that her MD makes house calls because she has no way to get there  Personal factors and/or comorbidities impacting plan of care:      Home Situation  Home Environment: Private residence  # Steps to Enter: 5  Rails to Enter: Yes  One/Two Story Residence: One story  Living Alone: Yes  Support Systems: Friends \ neighbors  Patient Expects to be Discharged to[de-identified] Private residence  Current DME Used/Available at Home: CPAP, Nebulizer     EXAMINATION/PRESENTATION/DECISION MAKING:   Critical Behavior:  Neurologic State: Alert  Orientation Level: Oriented X4        Hearing: Auditory  Auditory Impairment: None     Range Of Motion:  AROM: Generally decreased, functional                       Strength:    Strength: Generally decreased, functional        Functional Mobility:  Bed Mobility:   not tested           Transfers:  Sit to Stand: Stand-by asssistance  Stand to Sit: Stand-by asssistance                       Balance:   Sitting: Intact  Standing: Intact  Ambulation/Gait Training:  Distance (ft): 10 Feet (ft) (x 2)  Assistive Device: Gait belt  Ambulation - Level of Assistance: Stand-by asssistance        Gait Abnormalities: Antalgic;Decreased step clearance        Base of Support: Widened     Speed/Danay: Pace decreased (<100 feet/min); Shuffled; Slow  Step Length: Left shortened;Right shortened           Functional Measure:  Barthel Index:      Bathin  Bladder: 5  Bowels: 5  Groomin  Dressin  Feeding: 10  Mobility: 0  Stairs: 5  Toilet Use: 5  Transfer (Bed to Chair and Back): 10  Total: 50         Barthel and G-code impairment scale:  Percentage of impairment CH  0% CI  1-19% CJ  20-39% CK  40-59% CL  60-79% CM  80-99% CN  100%   Barthel Score 0-100 100 99-80 79-60 59-40 20-39 1-19    0   Barthel Score 0-20 20 17-19 13-16 9-12 5-8 1-4 0      The Barthel ADL Index: Guidelines  1. The index should be used as a record of what a patient does, not as a record of what a patient could do. 2. The main aim is to establish degree of independence from any help, physical or verbal, however minor and for whatever reason. 3. The need for supervision renders the patient not independent. 4. A patient's performance should be established using the best available evidence. Asking the patient, friends/relatives and nurses are the usual sources, but direct observation and common sense are also important. However direct testing is not needed. 5. Usually the patient's performance over the preceding 24-48 hours is important, but occasionally longer periods will be relevant. 6. Middle categories imply that the patient supplies over 50 per cent of the effort. 7. Use of aids to be independent is allowed. Lindsey Hutton., Barthel, DMangoW. (0780). Functional evaluation: the Barthel Index. [address] [facility] (14)2. PEACE Morillo, Katy Summers., Lelia Murphy.Renetta , [address] [place] [address] (1999). Measuring the change indisability after inpatient rehabilitation; comparison of the responsiveness of the Barthel Index and Functional [place] Measure. Journal of Neurology, Neurosurgery, and Psychiatry, 66(4), 300-671. Fide Steele, N.J.A, Johana Carrington,  W.J.M, & Juan Bence, M.A. (2004.) Assessment of post-stroke quality of life in cost-effectiveness studies: The usefulness of the Barthel Index and the EuroQoL-5D. Quality of Life Research, 13, 137-92            G codes:   In compliance with CMSs Claims Based Outcome Reporting, the following G-code set was chosen for this patient based on their primary functional limitation being treated: The outcome measure chosen to determine the severity of the functional limitation was the Barthel with a score of 50/100 which was correlated with the impairment scale. · Mobility - Walking and Moving Around:               - CURRENT STATUS:    CK - 40%-59% impaired, limited or restricted               - GOAL STATUS:           CJ - 20%-39% impaired, limited or restricted               - D/C STATUS:                       ---------------To be determined---------------         Pain:  Pain Scale 1: Numeric (0 - 10)  Pain Intensity 1: 0  Pain Location 1: Back  Pain Orientation 1: Posterior  Pain Description 1: Aching  Pain Intervention(s) 1: Medication (see MAR)  Activity Tolerance:   VSS  Please refer to the flowsheet for vital signs taken during this treatment. After treatment:   [X]         Patient left in no apparent distress sitting up in chair  [ ]         Patient left in no apparent distress in bed  [X]         Call bell left within reach  [X]         Nursing notified  [ ]         Caregiver present  [ ]         Bed alarm activated      COMMUNICATION/EDUCATION:   The patients plan of care was discussed with: Physical Therapist and Registered Nurse.  [X]         Fall prevention education was provided and the patient/caregiver indicated understanding. [X]         Patient/family have participated as able in goal setting and plan of care. [ ]         Patient/family agree to work toward stated goals and plan of care. [X]         Patient understands intent and goals of therapy, but is neutral about his/her participation. [ ]         Patient is unable to participate in goal setting and plan of care.      Thank you for this referral.  Rubi Paulino, PT, DPT   Time Calculation: 13 mins

## 2017-09-27 VITALS
HEART RATE: 84 BPM | SYSTOLIC BLOOD PRESSURE: 129 MMHG | RESPIRATION RATE: 20 BRPM | WEIGHT: 293 LBS | OXYGEN SATURATION: 95 % | DIASTOLIC BLOOD PRESSURE: 86 MMHG | BODY MASS INDEX: 66.01 KG/M2 | TEMPERATURE: 98.7 F

## 2017-09-27 PROBLEM — J18.9 MULTIFOCAL PNEUMONIA: Status: ACTIVE | Noted: 2017-09-27

## 2017-09-27 LAB
BACTERIA SPEC CULT: ABNORMAL
GLUCOSE BLD STRIP.AUTO-MCNC: 153 MG/DL (ref 65–100)
GLUCOSE BLD STRIP.AUTO-MCNC: 156 MG/DL (ref 65–100)
GLUCOSE BLD STRIP.AUTO-MCNC: 168 MG/DL (ref 65–100)
GRAM STN SPEC: ABNORMAL
SERVICE CMNT-IMP: ABNORMAL

## 2017-09-27 PROCEDURE — 94660 CPAP INITIATION&MGMT: CPT

## 2017-09-27 PROCEDURE — 74011636637 HC RX REV CODE- 636/637: Performed by: INTERNAL MEDICINE

## 2017-09-27 PROCEDURE — 74011250637 HC RX REV CODE- 250/637: Performed by: INTERNAL MEDICINE

## 2017-09-27 PROCEDURE — 94640 AIRWAY INHALATION TREATMENT: CPT

## 2017-09-27 PROCEDURE — 74011000250 HC RX REV CODE- 250: Performed by: INTERNAL MEDICINE

## 2017-09-27 PROCEDURE — 77010033678 HC OXYGEN DAILY

## 2017-09-27 PROCEDURE — 74011250637 HC RX REV CODE- 250/637: Performed by: NURSE PRACTITIONER

## 2017-09-27 PROCEDURE — 74011250636 HC RX REV CODE- 250/636: Performed by: INTERNAL MEDICINE

## 2017-09-27 PROCEDURE — 82962 GLUCOSE BLOOD TEST: CPT

## 2017-09-27 RX ORDER — LEVOFLOXACIN 750 MG/1
750 TABLET ORAL DAILY
Qty: 7 TAB | Refills: 0 | Status: SHIPPED | OUTPATIENT
Start: 2017-09-27 | End: 2017-11-26

## 2017-09-27 RX ORDER — DOCUSATE SODIUM 100 MG/1
100 CAPSULE, LIQUID FILLED ORAL
Qty: 60 CAP | Refills: 0 | Status: SHIPPED | OUTPATIENT
Start: 2017-09-27 | End: 2017-12-26

## 2017-09-27 RX ORDER — GUAIFENESIN 600 MG/1
600 TABLET, EXTENDED RELEASE ORAL EVERY 12 HOURS
Qty: 14 TAB | Refills: 0 | Status: SHIPPED | OUTPATIENT
Start: 2017-09-27 | End: 2017-10-04

## 2017-09-27 RX ORDER — BENZONATATE 100 MG/1
100 CAPSULE ORAL
Qty: 15 CAP | Refills: 0 | Status: ON HOLD | OUTPATIENT
Start: 2017-09-27 | End: 2018-01-22

## 2017-09-27 RX ORDER — PREDNISONE 20 MG/1
40 TABLET ORAL
Qty: 8 TAB | Refills: 0 | Status: SHIPPED | OUTPATIENT
Start: 2017-09-27 | End: 2017-11-26

## 2017-09-27 RX ADMIN — ASPIRIN 81 MG: 81 TABLET, COATED ORAL at 10:05

## 2017-09-27 RX ADMIN — INSULIN LISPRO 2 UNITS: 100 INJECTION, SOLUTION INTRAVENOUS; SUBCUTANEOUS at 11:30

## 2017-09-27 RX ADMIN — Medication 10 ML: at 13:16

## 2017-09-27 RX ADMIN — BENZONATATE 100 MG: 100 CAPSULE ORAL at 17:18

## 2017-09-27 RX ADMIN — SODIUM HYPOCHLORITE: 1.25 SOLUTION TOPICAL at 18:00

## 2017-09-27 RX ADMIN — IPRATROPIUM BROMIDE AND ALBUTEROL SULFATE 3 ML: .5; 3 SOLUTION RESPIRATORY (INHALATION) at 08:11

## 2017-09-27 RX ADMIN — INSULIN LISPRO 2 UNITS: 100 INJECTION, SOLUTION INTRAVENOUS; SUBCUTANEOUS at 17:23

## 2017-09-27 RX ADMIN — MICONAZOLE NITRATE: 20 CREAM TOPICAL at 10:07

## 2017-09-27 RX ADMIN — CYCLOBENZAPRINE HYDROCHLORIDE 5 MG: 10 TABLET, FILM COATED ORAL at 07:39

## 2017-09-27 RX ADMIN — GUAIFENESIN 600 MG: 600 TABLET, EXTENDED RELEASE ORAL at 10:05

## 2017-09-27 RX ADMIN — BENZONATATE 100 MG: 100 CAPSULE ORAL at 10:06

## 2017-09-27 RX ADMIN — ACETAMINOPHEN 650 MG: 325 TABLET ORAL at 07:39

## 2017-09-27 RX ADMIN — METOPROLOL TARTRATE 25 MG: 25 TABLET ORAL at 10:05

## 2017-09-27 RX ADMIN — FLUTICASONE PROPIONATE 2 SPRAY: 50 SPRAY, METERED NASAL at 10:07

## 2017-09-27 RX ADMIN — Medication 5 ML: at 06:00

## 2017-09-27 RX ADMIN — PREDNISONE 40 MG: 20 TABLET ORAL at 10:05

## 2017-09-27 RX ADMIN — METOPROLOL TARTRATE 25 MG: 25 TABLET ORAL at 17:18

## 2017-09-27 RX ADMIN — FUROSEMIDE 40 MG: 40 TABLET ORAL at 10:05

## 2017-09-27 RX ADMIN — INSULIN LISPRO 2 UNITS: 100 INJECTION, SOLUTION INTRAVENOUS; SUBCUTANEOUS at 10:04

## 2017-09-27 RX ADMIN — LISINOPRIL 40 MG: 20 TABLET ORAL at 10:05

## 2017-09-27 RX ADMIN — IPRATROPIUM BROMIDE AND ALBUTEROL SULFATE 3 ML: .5; 3 SOLUTION RESPIRATORY (INHALATION) at 14:52

## 2017-09-27 RX ADMIN — ENOXAPARIN SODIUM 40 MG: 40 INJECTION SUBCUTANEOUS at 10:05

## 2017-09-27 NOTE — ROUTINE PROCESS
Ino Gibson, is closing his practice. Made appointment with Dr. Russell Cano. [facility] Specialist [phone].

## 2017-09-27 NOTE — PROGRESS NOTES
1000: Case management is still in the process of getting transportation and Home Health cleared for patient's to be discharged home. 1730: Discharged patient with intructions on f/u appointment; medications SE; and smoking cessation and safety issues in regards to oxygen tank and smoking. Patient verbalized understanding of instructions. Nurse faxed prescriptions and insurance card to patient's prefferd pharmacy. 1852:  [facility]  transported patient via stretcher

## 2017-09-27 NOTE — PROGRESS NOTES
Pt is medically stable for dc home today. Pt selected [facility] - ref sent via [facility] for skilled nursing (wound care); PT; OT; aide. Ambo was arranged as pt requires 02 and is also deconditioned - cannot climb 5 steps to entry. 5:00 ambo was requested. Ambo now scheduled for 5:45 p/u. CM and NP both spoke with pt at length regarding recommendation for her to go to SNF at [facility]. Pt declined, stating that she needs to be in her home to pay bills at beginning of month, will not share a room with another pt, etc.     CM received a call from [facility], pt's [address] [place] [address] provider, stating that their aides cannot return to pt's home until it is sprayed for hyde infestation. Per pt, house was scheduled to be sprayed yesterday but this appmt had to be cancelled because she was in hospital. Per pt, house has been re-scheduled for next Tues and no other dates/times are avbl. Pt is angry with [employer] and her aide about hyde complaint and has contacted [facility] about using them for aides instead. Pt will meet w/ new agency to do paperwork and may have a new aide by [employer] or Sat. CM spoke w/ pt at length about whether she can manage in her home without an aide for a few days. Pt was adamant that she has contacted a friend to help her. CM also requested [facility] aide through [facility] to assist with ADLs until [facility]. Care aide is reinstated. Pt has appmt to f/u with Dr. Edith [facility] to see if she needs new CPAP. Pt is seen by Dr. Yaakov Vargas with [facility] and they will contact her to make next appmt. Care Management Interventions  PCP Verified by CM:  Yes (Dr. Yaakov Vargas)  Mode of Transport at Discharge: BLS  Transition of Care Consult (CM Consult): Home Health ([facility])  Discharge Durable Medical Equipment: No  Physical Therapy Consult: Yes  Occupational Therapy Consult: Yes  Current Support Network: Lives Alone  Confirm Follow Up Transport: Other (see comment) (Medicaid Transport)  Plan discussed with Pt/Family/Caregiver: Yes  Freedom of Choice Offered: Yes  Discharge Location  Discharge Placement: Home with home health     [address], [place]

## 2017-09-27 NOTE — PROGRESS NOTES
[facility]  Pulmonary, Critical Care, and Sleep Medicine    Patient Consult    Name: Meron Oropeza MRN: [id number]   : 1970 Hospital: [facility]   Date: 2017        IMPRESSION:   · Chronic hypoxic/hypercapnic respiratory failure  · GEN/OHS - followed by Dr. Jesus Banegas sleep She will need to arrange follow up upon discharge. · COPD with exacerbation - follows with Dr. Tera Borja  · \"pneumonia\" with minimal GGOs on CT - could easily be pulmonary edema but due to her size and echo is not useful due to poor acoustic window. · Atypical pain along her left chest, soft tissue. · Debility. · Morbid obesity. · Chronic Lower extremity edema. Pt is not able to care for herself. · CAD  · HTN  · DM  · Tobacco use, smoking until time of admission. · Discussed with Dr. Linda Garcia this am.   · Agree with discharge home. RECOMMENDATIONS:   · Recommend contacting her sleep physician, Dr. Min Dang, for orders for outpatient machine upon discharge. · Schedule bronchdilators  · Tobacco cessation counseling  · Recommend following up with Dr. Min Dang and Dr. Tera Borja after discharge  · May benefit from an Assisted liver facility. · Would give prednisone taper over 10 days. · Pt appears near her baseline from a pulmonary perspective. Will sign off and be available as needed. Please call if questions or issues. Subjective:     Reports still has some left sided chest and abdominal pain. Eating well. Sleeping well. No other acute changes or complaints. She seems frustrated with her condition. This patient has been seen and evaluated at the request of Dr. Brandt Blanco for \"needs CPAP\". Patient is a 41 y.o. female with GEN/OHS, followed by Dr. Min Dang for her GEN, admitted for pneumonia. She still feels poorly from her pneumonia and continues on antibiotics for that. I was asked to see her to order her nightly CPAP.   Her machine was taken away several months ago, possibly for insurance reasons. She does not recall her [employer] company, but she thinks it may be [employer]. She is still feeling poorly from her pneumonia with ongoing dyspnea. Past Medical History:   Diagnosis Date    Arthritis     Asthma     CAD (coronary artery disease)     Congestive heart failure (HCC)     [facility]     Diabetes ([facility]     Hypertension     Morbid obesity with BMI of 70 and over, adult ([facility]     NSTEMI (non-ST elevated myocardial infarction) ([facility]     SVT (supraventricular tachycardia) ([facility]       Past Surgical History:   Procedure Laterality Date    CARDIAC SURG PROCEDURE UNLIST      Stents    HX  SECTION      HX ORTHOPAEDIC      HX OTHER SURGICAL      cyst removed from back      Prior to Admission medications    Medication Sig Start Date End Date Taking? Authorizing Provider   benzonatate (TESSALON) 100 mg capsule Take 1 Cap by mouth three (3) times daily as needed for Cough. 17  Yes Astrid Almazan NP   docusate sodium (COLACE) 100 mg capsule Take 1 Cap by mouth two (2) times daily as needed for Constipation for up to 90 days. 17 Yes Astrid Almazan NP   guaiFENesin ER (MUCINEX) 600 mg ER tablet Take 1 Tab by mouth every twelve (12) hours for 7 days. 9/27/17 10/4/17 Yes Astrid Almazan NP   predniSONE (DELTASONE) 20 mg tablet Take 2 Tabs by mouth daily (with breakfast). 2 tab once a day for 2 days,  1 tab once a day for 2 days,  Then 1/2 tab once a day for 4 days 17  Yes Astrid Almazan NP   fluticasone (FLONASE) 50 mcg/actuation nasal spray 2 Sprays by Both Nostrils route daily. 17   Nesha Gallardo MD   nystatin (MYCOSTATIN) topical cream Apply  to affected area two (2) times a day. 17   Nesha Gallardo MD   HYDROcodone-acetaminophen (NORCO) 5-325 mg per tablet Take 1 Tab by mouth every four (4) hours as needed for Pain. Max Daily Amount: 6 Tabs.  17   Teena Garay MD l.acidoph-B.lactis-B.longum (FLORAJEN3) 460 mg (7.5-6- 1.5 bill. cell) cap cap Take 1 Cap by mouth Daily (before breakfast). 7/27/17   Tomasita Lennox, MD   albuterol (PROVENTIL VENTOLIN) 2.5 mg /3 mL (0.083 %) nebulizer solution 3 mL by Nebulization route every four (4) hours as needed for Wheezing. 6/12/17 April MARI Garner MD   furosemide (LASIX) 40 mg tablet Take 40 mg by mouth daily. Landon Raza MD   lovastatin (MEVACOR) 40 mg tablet Take 1 Tab by mouth nightly. 1/14/16   Mc Guerrero NP   clotrimazole (LOTRIMIN) 1 % topical cream Apply to the affected areas twice daily until healed 1/13/16   Rusty Sidhu MD   lisinopril (PRINIVIL, ZESTRIL) 40 mg tablet Take 40 mg by mouth daily. Historical Provider   glyBURIDE (DIABETA) 5 mg tablet Take 5 mg by mouth Daily (before breakfast). Historical Provider   cetirizine (ZYRTEC) 10 mg tablet Take 10 mg by mouth daily as needed for Allergies. Historical Provider   medroxyPROGESTERone (DEPO-PROVERA) 150 mg/mL injection 150 mg. 5/1/15   Landon Raza MD   omeprazole (PRILOSEC) 40 mg capsule Take 40 mg by mouth daily. 7/18/15   Landon Raza MD   ketoconazole (NIZORAL) 2 % topical cream Apply  to affected area daily. Historical Provider   ammonium lactate (LAC-HYDRIN) 12 % topical cream rub in to affected area well 11/21/14   Mckayla Elias MD   aspirin delayed-release 81 mg tablet Take 81 mg by mouth daily. Historical Provider   metoprolol (LOPRESSOR) 25 mg tablet Take 25 mg by mouth two (2) times a day. Historical Provider   nitroglycerin (NITROSTAT) 0.4 mg SL tablet 1 Tab by SubLINGual route every five (5) minutes as needed for Chest Pain (call 911 if not relieved by 3). 9/12/13   Mc Guerrero NP   albuterol (PROVENTIL, VENTOLIN) 90 mcg/actuation inhaler Take 1-2 Puffs by inhalation every four (4) hours as needed for Wheezing.  9/10/13   Loyd Mead MD     No Known Allergies   Social History   Substance Use Topics    Smoking status: Current Every Day Smoker     Packs/day: 0.25     Types: Cigarettes    Smokeless tobacco: Never Used    Alcohol use No      Family History   Problem Relation Age of Onset    Heart Disease Mother     Heart Disease Father     Heart Disease Sister         Current Facility-Administered Medications   Medication Dose Route Frequency    albuterol-ipratropium (DUO-NEB) 2.5 MG-0.5 MG/3 ML  3 mL Nebulization Q6HWA RT    sodium hypochlorite (QUARTER STRENGTH DAKIN'S) 0.125% irrigation (bottle)   Topical BID    ammonium lactate (LAC-HYDRIN) 12 % lotion   Topical BID    benzonatate (TESSALON) capsule 100 mg  100 mg Oral TID    guaiFENesin ER (MUCINEX) tablet 600 mg  600 mg Oral Q12H    enoxaparin (LOVENOX) injection 40 mg  40 mg SubCUTAneous Q12H    miconazole (MICOTIN) 2 % cream   Topical Q12H    azithromycin (ZITHROMAX) 500 mg in 0.9% sodium chloride (MBP/ADV) 250 mL  500 mg IntraVENous Q24H    aspirin delayed-release tablet 81 mg  81 mg Oral DAILY    fluticasone (FLONASE) 50 mcg/actuation nasal spray 2 Spray  2 Spray Both Nostrils DAILY    furosemide (LASIX) tablet 40 mg  40 mg Oral DAILY    lisinopril (PRINIVIL, ZESTRIL) tablet 40 mg  40 mg Oral DAILY    pravastatin (PRAVACHOL) tablet 20 mg  20 mg Oral QHS    metoprolol tartrate (LOPRESSOR) tablet 25 mg  25 mg Oral BID    predniSONE (DELTASONE) tablet 40 mg  40 mg Oral DAILY WITH BREAKFAST    sodium chloride (NS) flush 5-10 mL  5-10 mL IntraVENous Q8H    insulin lispro (HUMALOG) injection   SubCUTAneous AC&HS       Review of Systems:  A comprehensive review of systems was negative except for that written in the HPI.     Objective:   Vital Signs:    Visit Vitals    BP (!) 132/96 (BP 1 Location: Left arm, BP Patient Position: Sitting)    Pulse 83    Temp 98.3 °F (36.8 °C)    Resp 20    Wt (!) 185.5 kg (409 lb)    SpO2 92%    BMI 66.01 kg/m2       O2 Device: Nasal cannula   O2 Flow Rate (L/min): 5 l/min   Temp (24hrs), Av.2 °F (36.8 °C), Min:97.8 °F (36.6 °C), Max:98.5 °F (36.9 °C)       Intake/Output:   Last shift:      09/27 0701 - 09/27 1900  In: 120 [P.O.:120]  Out: -   Last 3 shifts: 09/25 1901 - 09/27 0700  In: 480 [P.O.:480]  Out: 350 [Urine:350]    Intake/Output Summary (Last 24 hours) at 09/27/17 1024  Last data filed at 09/27/17 0744   Gross per 24 hour   Intake              360 ml   Output              350 ml   Net               10 ml      Physical Exam:   General:  Alert, cooperative, no distress, appears stated age, morbidly obese, Was sitting up in chair eating breakfast this am.    Head:  Normocephalic, without obvious abnormality, atraumatic. Eyes:  Conjunctivae/corneas clear. Nose: Nares normal. Septum midline. Mucosa normal.     Throat: Lips, mucosa, and tongue normal.     Neck: Supple, symmetrical, trachea midline    Back:   Symmetric, no curvature. ROM normal.   Lungs:   Scattered rhonchi but was moving air pretty well. Chest wall:  No tenderness or deformity. Heart:  Regular rate and rhythm    Abdomen:   Soft, non-tender. Bowel sounds normal.    Extremities: Extremities normal, atraumatic, no cyanosis, +edema, appears to be chronic.     Skin: Skin color, texture, turgor normal. No rashes or lesions   Lymph nodes: Cervical, supraclavicular nodes normal.   Neurologic: Grossly nonfocal     Data review:     Recent Results (from the past 24 hour(s))   GLUCOSE, POC    Collection Time: 09/26/17 11:30 AM   Result Value Ref Range    Glucose (POC) 114 (H) 65 - 100 mg/dL    Performed by Christopher Mendez, POC    Collection Time: 09/26/17  5:36 PM   Result Value Ref Range    Glucose (POC) 153 (H) 65 - 100 mg/dL    Performed by [address] [place] [address], POC    Collection Time: 09/26/17  8:17 PM   Result Value Ref Range    Glucose (POC) 188 (H) 65 - 100 mg/dL    Performed by Evangelist MADISON    GLUCOSE, POC    Collection Time: 09/27/17  7:43 AM   Result Value Ref Range    Glucose (POC) 156 (H) 65 - 100 mg/dL    Performed by [facility]:  I have personally reviewed the patients radiographs and have reviewed the reports:  Mild emphysematous change, minimal GGOs        Adria MD Aileen

## 2017-09-27 NOTE — PROGRESS NOTES
[address] SHIFT NURSING NOTE    Bedside shift change report given to *** (oncoming nurse) by Sangeeta Larios (offgoing nurse). Report included the following information SBAR, Kardex, Intake/Output, MAR and Recent Results. SHIFT SUMMARY:             Admission Date 9/22/2017   Admission Diagnosis SOB (shortness of breath)  Shortness of breath   Consults IP CONSULT TO PULMONOLOGY        Consults   [] PT   [] OT   [] Speech   [] Palliative      [] Hospice    [x] Case Management   [] None   Cardiac Monitoring   [x] Yes   [] No     Antibiotics   [x] Yes   [] No   GI Prophylaxis  (Ex: Protonix, Pepcid, etc,.)   [x] Yes   [] No          DVT Prophylaxis   SCDs:             Haroon stockings:         [] Medication (Ex: Lovenox, Eliquis, Brilinta, Coumadin,  Heparin, etc..)   [] Contraindicated   [] No VTE needed       Urinary Catheter             LDAs               Peripheral IV 09/22/17 Right Antecubital (Active)   Site Assessment Clean, dry, & intact 9/27/2017  3:36 PM   Phlebitis Assessment 0 9/27/2017  3:36 PM   Infiltration Assessment 0 9/27/2017  3:36 PM   Dressing Status Clean, dry, & intact 9/27/2017  3:36 PM   Dressing Type Tape;Transparent 9/27/2017  3:36 PM   Hub Color/Line Status Patent;Pink 9/27/2017  3:36 PM                      I/Os   Intake/Output Summary (Last 24 hours) at 09/27/17 1649  Last data filed at 09/27/17 1536   Gross per 24 hour   Intake              360 ml   Output                0 ml   Net              360 ml         Activity Level Activity Level: Up with Assistance     Activity Assistance: Partial (one person)   Diet Active Orders   Diet    DIET REGULAR      Purposeful Rounding every 1-2 hour?    [x] Yes    Yu Score  Total Score: 3   Bed Alarm (If score 3 or >)   [] Yes    [] Refused (See signed refusal form in chart)   Jeffrey Score  Jeffrey Score: 18       Jeffrey Score (if score 14 or less)   [] PMT consult   [] Nutrition consult   [x] Wound Care consult      [x]  Specialty bed         Influenza Vaccine Received Flu Vaccine for Current Season (usually Sept-March): No    Patient/Guardian Refused (Notify MD): Yes          Needs prior to discharge:   Home O2 required:    [x] Yes   [] No     If yes, how much O2 required? Other:    Last Bowel Movement Date: 09/26/17   Readmission Risk Assessment Tool Score High Risk            25       Total Score        3 Has Seen PCP in Last 6 Months (Yes=3, No=0)    3 Patient Length of Stay (>5 days = 3)    4 IP Visits Last 12 Months (1-3=4, 4=9, >4=11)    9 Pt. Coverage (Medicare=5 , Medicaid, or Self-Pay=4)    6 Charlson Comorbidity Score (Age + Comorbid Conditions)        Criteria that do not apply:    . Living with Significant Other. Assisted Living. LTAC. SNF.  or   Rehab       Expected Length of Stay 4d 14h   Actual Length of Stay 3

## 2017-09-27 NOTE — PROGRESS NOTES
OT order received, chart reviewed, noted discharge order and noted NP plan to send patient home with [address] [place] [address], PT and RN. Spoke with CM regarding HHOT need upon discharge today. No acute OT evaluation needed at this time to help facilitate authorization of [address] [place] [address]. CM to notify this OT if evaluation is needed. Will defer OT evaluation at this time 2/2 the above.

## 2017-09-27 NOTE — DISCHARGE SUMMARY
Hospitalist Discharge Summary     Patient ID:  Teresa Arora  [id number]  41 y.o.  1970    PCP on record: PROVIDER UNKNOWN    Admit date: 9/22/2017  Discharge date and time: 9/27/2017      DISCHARGE DIAGNOSIS:    Multifocal Community acquired pneumonia   Chronic hypoxic and hypercapneic respiratory failure in setting of OHS  COPD exacerbation  CAD / HTN  Chronic diastolic HF  Chronic LE lymphedema / venous stasis POA  DM  Candida intertrigo  Severe Obesity        CONSULTATIONS:  IP CONSULT TO PULMONOLOGY    Excerpted HPI from H&P of German Pennington MD:  Sandro Felix is a 41 y.o.  female with history of morbid obesity OHS on 5 L O2 at baseline, COPD, CHF who presents with shortness of breath. Patient felt she could not tolerate her concentrator at home she felt like it was too strong and causing her SOB so she turned it down to 3 L. Has had chronic cough, no fevers or chills. Also has not had CPAP for months it was broken and her insurance company took it away, she has not been able to arrange going to a clinic because she would need a lot of O2 tanks to go.        ______________________________________________________________________  DISCHARGE SUMMARY/HOSPITAL COURSE:  for full details see H&P, daily progress notes, labs, consult notes. Multifocal Community acquired pneumonia   Chronic hypoxic and hypercapneic respiratory failure in setting of OHS  COPD exacerbation  - on baseline O2 of 5-6 L.    - Dr. Donis Eagle ordered home settings for her BiPAP. Recommend to follow up with Dr. Merlinda Neer and Dr. Montana New upon discharge.   - CT chest Multifocal patchy airspace disease  - received ceftriaxone and azithromycin during hospitalization. Pt will complete additional 7 days with Levo. - complete the prednisone taper.  -  has been working with this patient during hospitalization. The patient will be discharged to home with home health care service.   Pt c/o not having support for her, but she refused to go to SNF.        CAD / HTN  Chronic diastolic HF  - continue with lasix, metoprolol, & lisinopril      Chronic LE lymphedema / venous stasis POA  - pt unable to go lymphemia clinic due to transportation issue, limited family support. - appreciate wound care team input:  Wound Care for LLE posterior weeping skin and BLE and feet skin should switch to M-W-F wound care as follows: cleanse with 1/4 Dakins solution and wash BLE with soap and water. Apply lac-hydrin lotion to BLE and feet skin. Cover weeping area with Aquacell=AG silver hydrafiber and wrap with dry dressing. Ultimately she needs to be seen at a lymphedema clinic [facility]), because this problem of her BLE is not going to go away with basic wound care.      DM  - Resume glyburide   - Hgb A1C (7/2017): 5.3      Candida intertrigo  - apply nystatin topical cream after skin care    Severe Obesity  - Body mass index is 71.82 kg/(m^2).               _______________________________________________________________________  Patient seen and examined by me on discharge day. Pertinent Findings:  Gen:    Not in distress  Chest: Clear in apex with decreased breath sounds at bases  CVS:   Regular rhythm. No edema  Abd:  Soft, not distended, not tender  Neuro:  Alert, orient x 4  _______________________________________________________________________  DISCHARGE MEDICATIONS:   Current Discharge Medication List      START taking these medications    Details   benzonatate (TESSALON) 100 mg capsule Take 1 Cap by mouth three (3) times daily as needed for Cough. Qty: 15 Cap, Refills: 0      docusate sodium (COLACE) 100 mg capsule Take 1 Cap by mouth two (2) times daily as needed for Constipation for up to 90 days. Qty: 60 Cap, Refills: 0      guaiFENesin ER (MUCINEX) 600 mg ER tablet Take 1 Tab by mouth every twelve (12) hours for 7 days.   Qty: 14 Tab, Refills: 0      predniSONE (DELTASONE) 20 mg tablet Take 2 Tabs by mouth daily (with breakfast). 2 tab once a day for 2 days,  1 tab once a day for 2 days,  Then 1/2 tab once a day for 4 days  Qty: 8 Tab, Refills: 0      levoFLOXacin (LEVAQUIN) 750 mg tablet Take 1 Tab by mouth daily. Indications: multifocal pneumonia  Qty: 7 Tab, Refills: 0         CONTINUE these medications which have NOT CHANGED    Details   fluticasone (FLONASE) 50 mcg/actuation nasal spray 2 Sprays by Both Nostrils route daily. Qty: 1 Bottle, Refills: 0      l.acidoph-B.lactis-B.longum (FLORAJEN3) 460 mg (7.5-6- 1.5 bill. cell) cap cap Take 1 Cap by mouth Daily (before breakfast). Qty: 7 Cap, Refills: 0      albuterol (PROVENTIL VENTOLIN) 2.5 mg /3 mL (0.083 %) nebulizer solution 3 mL by Nebulization route every four (4) hours as needed for Wheezing. Qty: 24 Each, Refills: 0      furosemide (LASIX) 40 mg tablet Take 40 mg by mouth daily. lovastatin (MEVACOR) 40 mg tablet Take 1 Tab by mouth nightly. Qty: 30 Tab, Refills: 6    Associated Diagnoses: Atherosclerosis of native coronary artery without angina pectoris      clotrimazole (LOTRIMIN) 1 % topical cream Apply to the affected areas twice daily until healed  Qty: 15 g, Refills: 0      lisinopril (PRINIVIL, ZESTRIL) 40 mg tablet Take 40 mg by mouth daily. glyBURIDE (DIABETA) 5 mg tablet Take 5 mg by mouth Daily (before breakfast). cetirizine (ZYRTEC) 10 mg tablet Take 10 mg by mouth daily as needed for Allergies. omeprazole (PRILOSEC) 40 mg capsule Take 40 mg by mouth daily. ketoconazole (NIZORAL) 2 % topical cream Apply  to affected area daily. ammonium lactate (LAC-HYDRIN) 12 % topical cream rub in to affected area well  Qty: 280 g, Refills: 1      aspirin delayed-release 81 mg tablet Take 81 mg by mouth daily. metoprolol (LOPRESSOR) 25 mg tablet Take 25 mg by mouth two (2) times a day.       nitroglycerin (NITROSTAT) 0.4 mg SL tablet 1 Tab by SubLINGual route every five (5) minutes as needed for Chest Pain (call 911 if not relieved by 3). Qty: 25 Tab, Refills: 2    Associated Diagnoses: SVT (supraventricular tachycardia) (HCC); CHF (congestive heart failure) (HCC)      albuterol (PROVENTIL, VENTOLIN) 90 mcg/actuation inhaler Take 1-2 Puffs by inhalation every four (4) hours as needed for Wheezing. Qty: 17 g, Refills: 0         STOP taking these medications       nystatin (MYCOSTATIN) topical cream Comments:   Reason for Stopping:         HYDROcodone-acetaminophen (NORCO) 5-325 mg per tablet Comments:   Reason for Stopping:         medroxyPROGESTERone (DEPO-PROVERA) 150 mg/mL injection Comments:   Reason for Stopping:               My Recommended Diet, Activity, Wound Care, and follow-up labs are listed in the patient's Discharge Insturctions which I have personally completed and reviewed. _______________________________________________________________________  DISPOSITION:    Home with Family:    Home with HH/PT/OT/RN: y   SNF/LTC:    LUIS A:    OTHER:        Condition at Discharge:  Stable  _______________________________________________________________________  Follow up with:   PCP : PROVIDER UNKNOWN  Follow-up Information     Follow up With Details Comments Contact Info Additional Information    [facility] Go on 10/4/2017 Follow up at 9:00AM with Dr. Fiorella Wells. If you have a CPAP machine, please bring it to the appointment. [facility] [address] [place] [address]  [phone] SLEEP LAB AT [facility] [address] [facility] MEDICAL OFFICE BUILDING [address] (second floor) [address] [facility] Telephone# [phone]  Do not arrive more than 15 minutes prior to appt time and use the doorbell located to the left of the door to enter into [address].       Cesar Quinn MD Schedule an appointment as soon as possible for a visit to be seen within 2 weeks [address] [facility]  [address] [address]  [address]  [phone]                 Total time in minutes spent coordinating this discharge (includes going over instructions, follow-up, prescriptions, and preparing report for sign off to her PCP) : 30 minutes    Signed:  Astrid Kim NP

## 2017-09-27 NOTE — DISCHARGE INSTRUCTIONS
Patient Discharge Instructions     Pt Name  Avi Sawyer   Date of Birth 1970   Age  41 y.o. Medical Record Number  [id number]   PCP PROVIDER UNKNOWN    Admit date:  9/22/2017 @    [place] [address]    Room Number  2220/01   Date of Discharge 9/27/2017     Admission Diagnoses:     Multifocal pneumonia        No Known Allergies     You were admitted to [address] for  Multifocal pneumonia    YOUR OTHER MEDICAL DIAGNOSES INCLUDE (BUT NOT LIMITED TO ):  Present on Admission:   SOB (shortness of breath)   Shortness of breath   Type II diabetes mellitus, uncontrolled ([address]   Obesity, morbid (more than 100 lbs over ideal weight or BMI > 40) (HCC)   CHF (congestive heart failure) (HCC)   Cellulitis   Lymphedema of both lower extremities   Multifocal pneumonia      DIET:  Cardiac Diet and Diabetic Diet   Recommended activity: Activity as tolerated  Follow up : Follow-up Information     Follow up With Details Comments Contact Info Additional Information    [facility] [place] [facility] on 10/4/2017 Follow up at 9:00AM with Dr. Evan Ellison. If you have a CPAP machine, please bring it to the appointment. [facility] [address] [place] [address]  [phone] SLEEP LAB AT [address] [facility] MEDICAL OFFICE BUILDING [address] (second floor) [address] [facility] Telephone# [phone]  Do not arrive more than 15 minutes prior to appt time and use the doorbell located to the left of the door to enter into [address]. Wound Care for LLE posterior weeping skin and BLE and feet skin should switch to M-W-F wound care as follows: cleanse with 1/4 Dakins solution and wash BLE with soap and water. Apply lac-hydrin lotion to BLE and feet skin. Cover weeping area with Aquacell=AG silver hydrafiber and wrap with dry dressing.  Ultimately she needs to be seen at a lymphedema clinic [facility] or [place]), because this problem of her BLE is not going to go away with basic wound care. · It is important that you take the medication exactly as they are prescribed. · Keep your medication in the bottles provided by the pharmacist and keep a list of the medication names, dosages, and times to be taken in your wallet. · Do not take other medications without consulting your doctor. ADDITIONAL INFORMATION: If you experience any of the following symptoms or have any health problem not listed below, then please call your primary care physician or return to the emergency room if you cannot get hold of your doctor: Fever, chills, nausea, vomiting, diarrhea, change in mentation, falling, bleeding, shortness of breath. I understand that if any problems occur once I am discharged, I am supposed to call my Primary care physician for further care or seek help in the Emergency Department at the nearest Healthcare facility. I have had an opportunity to discuss my clinical issues with my doctor and nursing staff. I understand and acknowledge receipt of the above instructions.                                                                                                                                            Physician's or R.N.'s Signature                                                            Date/Time                                                                                                                                              Patient or Representative Signature                                                 Date/Time

## 2017-09-28 ENCOUNTER — HOME HEALTH ADMISSION (OUTPATIENT)
Dept: HOME HEALTH SERVICES | Facility: HOME HEALTH | Age: 47
End: 2017-09-28

## 2017-09-28 NOTE — PROGRESS NOTES
1708: At [facility] has not been able to determine pt's insurance and will not start care until this is known. CM spoke to oz, Avel Cortes? several times in effort to determine this. At [facility] can see pt if Medicare is primary, but not if [facility] plan is primary. She was unable to get answer from inhouse .    Ref was sent to [facility] - they are unable to accept because pt does not have a caregiver to teach the wound care. Ref was sent to [facility] to see if they would accept pt back. She is on their \"Do Not Resume Care\" list for noncompliance and caregiver issues. Ref sent to [facility] and [facility] - in the past they have participated with [facility]. CM called Customer c number on pts [facility] card. Repr stated that [facility] is a Medicare/Medicaid plan which is part of [place] [employer]. Repr also stated that pt has a traditional Medicare plan (which is a complete contradiction). Repr from [facility] did not seem to understand what she was talking about. Still unclear what type of insurance pt actually has. CM called pt back at 5:00 PM to explain that Ace Dubon RN will not be coming on Fri - still trying to Sat. CM again offered option of going to SNF. CM will check back with At [facility] tomorrow to see if they have figured anything out. CM will keep pt updated. 1300:  CM received phone call from pt stating that her pads on leg wound were slipping off - wanted to know when [place] was coming. DC orders stated pt to have wound care MWF, so CM assumed that RN would be coming out on Fri. Pt was discharged home yesterday with plans for [place] through At [facility]. CM called At [facility] today to inquire if pt would be seen on Fri for wound care. Mgr told this CM that they are not participating in pt's new insurance plan - [facility], so they are unable to see pt. Ref sent to [facility] via [address] [place] [address] Avenue to see if in network with pt's insur and if pt can be seen on Fri.     Sandor Chapman, MSW

## 2017-09-29 NOTE — PROGRESS NOTES
[facility] has accepted pt - will call her today - will try to fit into schedule today but may be Sat. Before she can be seen.   Gloria Heredia, MSW

## 2017-10-06 NOTE — TELEPHONE ENCOUNTER
Service and repair order written this patient will need to come for a sleep study and close out patient follow-up. A Repair / Replace order has been written as requested. Orders Placed This Encounter    AMB SUPPLY ORDER     Diagnosis: Obstructive Sleep Apnea G47.33    Service and Repair patient PAP device. Replace if damaged beyond repair. Jaymie Younger MD, FAASM  Electronically signed.  10/06/17

## 2017-10-09 NOTE — TELEPHONE ENCOUNTER
Patient would like to do sleep study then come in for an office visit. Can you please put an order in and then I will schedule her? Thanks.

## 2017-10-12 ENCOUNTER — PATIENT OUTREACH (OUTPATIENT)
Dept: CASE MANAGEMENT | Age: 47
End: 2017-10-12

## 2017-10-12 NOTE — PROGRESS NOTES
[address]  [phone]  Fax [phone]    Patient Name: Lucero Herrera  YOB: 1970    Dr Ramon Jama, Visiting Physicians [phone]  [facility] Patient for [address] for (SN for wound care, PT & OT)  M, W & F. [address] [facility] outreach call post hospitalization for high risk patient. Patient hospitalized from 9/22-9/27/17 for Pneumonia at [facility].  Patient denied any questions or concerns with discharge instructions. Offered to call back at a better time but patient states that it was a good time. Periodically would talk to her nephew while answering follow up questions. Denied the need to follow up with the sleep study since she already has a CPAP and that it is just broken. States she is already working on getting a new one and did not need assistance. She believes it to be with \"west something\". States that [place] has started coming to the home 3 times a week. She has PT, OT and SN. Her aide is through [facility]. Dr Radha Isaacs, PCP came by last Friday. He comes out once a month. No further action at this time by NN. Resolving LETICIA.

## 2017-10-16 NOTE — TELEPHONE ENCOUNTER
Orders Placed This Encounter    AMB SUPPLY ORDER     Diagnosis: Obstructive Sleep Apnea G47.33    Service and Repair patient PAP device. Replace if damaged beyond repair. Vibha Gomez MD, FAASM  Electronically signed. 10/06/17    SPLIT CPAP/PSG     Standing Status:   Future     Standing Expiration Date:   4/16/2018     Scheduling Instructions:      Start testing without supplemental oxygen, monitor TCO2 - Split early and move through CPAP to Bi-Level to AVAPS to treat events, hypoxemia and hypercapnia. Perform Bi-Level S/T with AVAPS titration with TCO2 monitoring - starting settings:      Max pressure: 30 cmH2O;       Minimum EPAP: 6 cmH2O; Minimum IPAP: 10; Tidal Volume: Starting 400 ml, Maximum 600 ml; Rate: 8. Increase EPAP to treat Obstructive Apnea. Increase Tidal Volume in increments of 30 ml to treat Hypopneas / Hypoxemia / Hypercapnia (maintain TCO2 between 45 - 47 mmH2O).      Order Specific Question:   Reason for Exam     Answer:   GEN / OHS / COPD

## 2017-10-16 NOTE — TELEPHONE ENCOUNTER
· Order for AVAPS will be sent to [facility] with supporting documentation for diagnosis. · We will schedule 1st adherence visit appx 6 weeks after setup. · Patient has been notified of pathway for AVAPS setup and the possibility of an in-lab sleep study at a later date.

## 2017-10-16 NOTE — TELEPHONE ENCOUNTER
Patient states that she has a hard time getting around. She also states that she cannot drive. Patient would like to come in for a sleep study but needs transportation arrangements. I have tried to contact her (Leanna Jimenez)  that I spoke with while the patient was in the hospital. I have left voicemails for her on 10/9/17 and 10/10/17. Still have not heard back from her. I have spoken with Keren Medrano and Dr. Calhoun  and they are OK with having the patient come in for a sleep study and then continuing her care but they want to see exactly what the patient may need help with to make sure that we are able to serve her properly. The patients Cpap device broke while in the hospital and she needs a new device ASAP.

## 2017-10-17 DIAGNOSIS — J44.9 CHRONIC OBSTRUCTIVE PULMONARY DISEASE, UNSPECIFIED COPD TYPE (HCC): ICD-10-CM

## 2017-10-17 DIAGNOSIS — E66.2 OBESITY HYPOVENTILATION SYNDROME (HCC): Primary | ICD-10-CM

## 2017-10-17 NOTE — LETTER
10/17/2017 9:07 AM 
 
Ms. Lucero Herrera [address] [facility] [facility] [address] To whom it may concern Ms. Lucero Herrera is a morbidly obese individual who Obesity Hypoventilation Syndrome also carries the diagnosis of COPD. She was prescribed a Bi-Level S/T with AVAPS device on 09-26-14. She has been hospitalized at least twice in 2016 and 2017. See documentation regarding discharge diagnosis and arterial blood gas testing below: 
 
Admit date: 10/28/2016 Discharge date and time: 10/30/2016 DISCHARGE DIAGNOSIS: 
 
Acute respiratory failure, with hypoxia and hypercarbia Present on arrival (POA ) Mild respiratory acidosis-POA Chronic respiratory failure with hypoxia on 6L at baseline-POA 
COPD exacerbation POA Admit date: 9/22/2017 Discharge date and time: 9/27/2017 DISCHARGE DIAGNOSIS: 
  
Multifocal Community acquired pneumonia Chronic hypoxic and hypercapneic respiratory failure in setting of OHS 
COPD exacerbation Chronic diastolic HF Severe Obesity BLOOD GAS ANALYSIS: 09- 
pH 7.35  7.35 - 7.45   Final  
PCO2 55 (H) 35.0 - 45.0 mmHg Final  
PO2 67 (L) 80 - 100 mmHg Final  
O2 SAT 92  92 - 97 % Final  
BICARBONATE 30 (H) 22 - 26 mmol/L Final  
BASE EXCESS 3.2   mmol/L Final  
O2 METHOD NASAL O2     Final  
O2 FLOW RATE 5.00   L/min Final  
FIO2 40   % Final  
Sample source ARTERIAL     Final  
  It was due to these recurring hospitalizations and persistent hypercapnia a prescription for a Trilogy device is being written. Sincerely, Pacheco Silverman MD

## 2017-10-17 NOTE — TELEPHONE ENCOUNTER
Sent order to [facility] on 10/17/17. Will call [address] - [facility] JODY MINOR on 10/18/17 and follow up on the order.

## 2017-11-26 ENCOUNTER — HOSPITAL ENCOUNTER (EMERGENCY)
Age: 47
Discharge: HOME | End: 2017-11-26
Attending: EMERGENCY MEDICINE | Admitting: EMERGENCY MEDICINE
Payer: MEDICARE

## 2017-11-26 ENCOUNTER — APPOINTMENT (OUTPATIENT)
Dept: GENERAL RADIOLOGY | Age: 47
End: 2017-11-26
Attending: EMERGENCY MEDICINE
Payer: MEDICARE

## 2017-11-26 VITALS
SYSTOLIC BLOOD PRESSURE: 118 MMHG | WEIGHT: 293 LBS | HEIGHT: 66 IN | HEART RATE: 81 BPM | DIASTOLIC BLOOD PRESSURE: 72 MMHG | TEMPERATURE: 98 F | RESPIRATION RATE: 18 BRPM | OXYGEN SATURATION: 93 % | BODY MASS INDEX: 47.09 KG/M2

## 2017-11-26 DIAGNOSIS — J45.41 MODERATE PERSISTENT ASTHMA WITH ACUTE EXACERBATION: Primary | ICD-10-CM

## 2017-11-26 LAB
ALBUMIN SERPL-MCNC: 3.2 G/DL (ref 3.5–5)
ALBUMIN/GLOB SERPL: 0.7 {RATIO} (ref 1.1–2.2)
ALP SERPL-CCNC: 86 U/L (ref 45–117)
ALT SERPL-CCNC: 18 U/L (ref 12–78)
ANION GAP SERPL CALC-SCNC: 3 MMOL/L (ref 5–15)
AST SERPL-CCNC: 14 U/L (ref 15–37)
BASOPHILS # BLD: 0 K/UL (ref 0–0.1)
BASOPHILS NFR BLD: 0 % (ref 0–1)
BILIRUB SERPL-MCNC: 0.5 MG/DL (ref 0.2–1)
BNP SERPL-MCNC: 230 PG/ML (ref 0–125)
BUN SERPL-MCNC: 14 MG/DL (ref 6–20)
BUN/CREAT SERPL: 19 (ref 12–20)
CALCIUM SERPL-MCNC: 10.3 MG/DL (ref 8.5–10.1)
CHLORIDE SERPL-SCNC: 104 MMOL/L (ref 97–108)
CO2 SERPL-SCNC: 33 MMOL/L (ref 21–32)
CREAT SERPL-MCNC: 0.72 MG/DL (ref 0.55–1.02)
EOSINOPHIL # BLD: 0.2 K/UL (ref 0–0.4)
EOSINOPHIL NFR BLD: 2 % (ref 0–7)
ERYTHROCYTE [DISTWIDTH] IN BLOOD BY AUTOMATED COUNT: 14.7 % (ref 11.5–14.5)
GLOBULIN SER CALC-MCNC: 4.8 G/DL (ref 2–4)
GLUCOSE SERPL-MCNC: 76 MG/DL (ref 65–100)
HCT VFR BLD AUTO: 42.3 % (ref 35–47)
HGB BLD-MCNC: 12.9 G/DL (ref 11.5–16)
LYMPHOCYTES # BLD: 1.5 K/UL (ref 0.8–3.5)
LYMPHOCYTES NFR BLD: 12 % (ref 12–49)
MCH RBC QN AUTO: 29 PG (ref 26–34)
MCHC RBC AUTO-ENTMCNC: 30.5 G/DL (ref 30–36.5)
MCV RBC AUTO: 95.1 FL (ref 80–99)
MONOCYTES # BLD: 0.9 K/UL (ref 0–1)
MONOCYTES NFR BLD: 7 % (ref 5–13)
NEUTS SEG # BLD: 10.4 K/UL (ref 1.8–8)
NEUTS SEG NFR BLD: 79 % (ref 32–75)
PLATELET # BLD AUTO: 216 K/UL (ref 150–400)
POTASSIUM SERPL-SCNC: 4.3 MMOL/L (ref 3.5–5.1)
PROT SERPL-MCNC: 8 G/DL (ref 6.4–8.2)
RBC # BLD AUTO: 4.45 M/UL (ref 3.8–5.2)
SODIUM SERPL-SCNC: 140 MMOL/L (ref 136–145)
TROPONIN I SERPL-MCNC: <0.04 NG/ML
WBC # BLD AUTO: 13 K/UL (ref 3.6–11)

## 2017-11-26 PROCEDURE — 71010 XR CHEST PORT: CPT

## 2017-11-26 PROCEDURE — 99284 EMERGENCY DEPT VISIT MOD MDM: CPT

## 2017-11-26 PROCEDURE — 85025 COMPLETE CBC W/AUTO DIFF WBC: CPT | Performed by: EMERGENCY MEDICINE

## 2017-11-26 PROCEDURE — 77030029684 HC NEB SM VOL KT MONA -A

## 2017-11-26 PROCEDURE — 84484 ASSAY OF TROPONIN QUANT: CPT | Performed by: EMERGENCY MEDICINE

## 2017-11-26 PROCEDURE — 80053 COMPREHEN METABOLIC PANEL: CPT | Performed by: EMERGENCY MEDICINE

## 2017-11-26 PROCEDURE — 94640 AIRWAY INHALATION TREATMENT: CPT

## 2017-11-26 PROCEDURE — 83880 ASSAY OF NATRIURETIC PEPTIDE: CPT | Performed by: EMERGENCY MEDICINE

## 2017-11-26 PROCEDURE — 74011000250 HC RX REV CODE- 250: Performed by: EMERGENCY MEDICINE

## 2017-11-26 PROCEDURE — 74011636637 HC RX REV CODE- 636/637: Performed by: EMERGENCY MEDICINE

## 2017-11-26 PROCEDURE — 36415 COLL VENOUS BLD VENIPUNCTURE: CPT | Performed by: EMERGENCY MEDICINE

## 2017-11-26 PROCEDURE — 74011250637 HC RX REV CODE- 250/637: Performed by: EMERGENCY MEDICINE

## 2017-11-26 RX ORDER — PREDNISONE 20 MG/1
60 TABLET ORAL
Status: COMPLETED | OUTPATIENT
Start: 2017-11-26 | End: 2017-11-26

## 2017-11-26 RX ORDER — PREDNISONE 10 MG/1
TABLET ORAL
Qty: 48 TAB | Refills: 0 | Status: ON HOLD | OUTPATIENT
Start: 2017-11-26 | End: 2018-01-22

## 2017-11-26 RX ORDER — ACETAMINOPHEN 325 MG/1
650 TABLET ORAL
Status: COMPLETED | OUTPATIENT
Start: 2017-11-26 | End: 2017-11-26

## 2017-11-26 RX ORDER — IPRATROPIUM BROMIDE AND ALBUTEROL SULFATE 2.5; .5 MG/3ML; MG/3ML
3 SOLUTION RESPIRATORY (INHALATION) ONCE
Status: COMPLETED | OUTPATIENT
Start: 2017-11-26 | End: 2017-11-26

## 2017-11-26 RX ORDER — AZITHROMYCIN 250 MG/1
TABLET, FILM COATED ORAL
Qty: 6 TAB | Refills: 0 | Status: ON HOLD | OUTPATIENT
Start: 2017-11-26 | End: 2018-01-22

## 2017-11-26 RX ORDER — AZITHROMYCIN 250 MG/1
500 TABLET, FILM COATED ORAL
Status: COMPLETED | OUTPATIENT
Start: 2017-11-26 | End: 2017-11-26

## 2017-11-26 RX ADMIN — AZITHROMYCIN 500 MG: 250 TABLET, FILM COATED ORAL at 16:45

## 2017-11-26 RX ADMIN — IPRATROPIUM BROMIDE AND ALBUTEROL SULFATE 3 ML: .5; 2.5 SOLUTION RESPIRATORY (INHALATION) at 14:37

## 2017-11-26 RX ADMIN — PREDNISONE 60 MG: 20 TABLET ORAL at 13:10

## 2017-11-26 RX ADMIN — ACETAMINOPHEN 325 MG: 325 TABLET ORAL at 16:45

## 2017-11-26 RX ADMIN — IPRATROPIUM BROMIDE AND ALBUTEROL SULFATE 3 ML: .5; 3 SOLUTION RESPIRATORY (INHALATION) at 13:10

## 2017-11-26 NOTE — ED NOTES
Pt requesting headache medicine and additional meal tray and requests mustard from the cafeteria. New orders received from Dr. Carlita Mcneal. Dietary called for meal tray and mustard packets. Pt updated on plan of care.

## 2017-11-26 NOTE — ED NOTES
Bedside shift change report given to Xochilt Carter (oncoming nurse) by Jenny Tate RN (offgoing nurse). Report included the following information SBAR, ED Summary and MAR.

## 2017-11-26 NOTE — ED NOTES
Phone call received from [facility] [place] [address] for transport service for discharge is 1900.

## 2017-11-26 NOTE — DISCHARGE INSTRUCTIONS
Asthma Attack: Care Instructions  Your Care Instructions    During an asthma attack, the airways swell and narrow. This makes it hard to breathe. Severe asthma attacks can be life-threatening, but you can help prevent them by keeping your asthma under control and treating symptoms before they get bad. Symptoms include being short of breath, having chest tightness, coughing, and wheezing. Noting and treating these symptoms can also help you avoid future trips to the emergency room. The doctor has checked you carefully, but problems can develop later. If you notice any problems or new symptoms, get medical treatment right away. Follow-up care is a key part of your treatment and safety. Be sure to make and go to all appointments, and call your doctor if you are having problems. It's also a good idea to know your test results and keep a list of the medicines you take. How can you care for yourself at home? · Follow your asthma action plan to prevent and treat attacks. If you don't have an asthma action plan, work with your doctor to create one. · Take your asthma medicines exactly as prescribed. Talk to your doctor right away if you have any questions about how to take them. ¨ Use your quick-relief medicine when you have symptoms of an attack. Quick-relief medicine is usually an albuterol inhaler. Some people need to use quick-relief medicine before they exercise. ¨ Take your controller medicine every day, not just when you have symptoms. Controller medicine is usually an inhaled corticosteroid. The goal is to prevent problems before they occur. Don't use your controller medicine to treat an attack that has already started. It doesn't work fast enough to help. ¨ If your doctor prescribed corticosteroid pills to use during an attack, take them exactly as prescribed. It may take hours for the pills to work, but they may make the episode shorter and help you breathe better.   ¨ Keep your quick-relief medicine with you at all times. · Talk to your doctor before using other medicines. Some medicines, such as aspirin, can cause asthma attacks in some people. · If you have a peak flow meter, use it to check how well you are breathing. This can help you predict when an asthma attack is going to occur. Then you can take medicine to prevent the asthma attack or make it less severe. · Do not smoke or allow others to smoke around you. Avoid smoky places. Smoking makes asthma worse. If you need help quitting, talk to your doctor about stop-smoking programs and medicines. These can increase your chances of quitting for good. · Learn what triggers an asthma attack for you, and avoid the triggers when you can. Common triggers include colds, smoke, air pollution, dust, pollen, mold, pets, cockroaches, stress, and cold air. · Avoid colds and the flu. Get a pneumococcal vaccine shot. If you have had one before, ask your doctor if you need a second dose. Get a flu vaccine every fall. If you must be around people with colds or the flu, wash your hands often. When should you call for help? Call 911 anytime you think you may need emergency care. For example, call if:  ? · You have severe trouble breathing. ?Call your doctor now or seek immediate medical care if:  ? · Your symptoms do not get better after you have followed your asthma action plan. ? · You have new or worse trouble breathing. ? · Your coughing and wheezing get worse. ? · You cough up dark brown or bloody mucus (sputum). ? · You have a new or higher fever. ? Watch closely for changes in your health, and be sure to contact your doctor if:  ? · You need to use quick-relief medicine on more than 2 days a week (unless it is just for exercise). ? · You cough more deeply or more often, especially if you notice more mucus or a change in the color of your mucus. ? · You are not getting better as expected. Where can you learn more?   Go to http://cassia-donte.info/. Enter Y529 in the search box to learn more about \"Asthma Attack: Care Instructions. \"  Current as of: May 12, 2017  Content Version: 11.4  © 1114-3853 [employer], [employer]. Care instructions adapted under license by [employer] (which disclaims liability or warranty for this information). If you have questions about a medical condition or this instruction, always ask your healthcare professional. [place] [address] any warranty or liability for your use of this information.

## 2017-11-26 NOTE — ED TRIAGE NOTES
Pt arrives via [facility] for SOB. Pt refusing to sit on ED stretcher and placed in POC in chair upon arrival. Pt complains of SOB x last week Pt states she was seen 1.5 ago on Wed and was being tx for Pneumonia Pt states she completed her antibiotic this past Monday.  Pt with hx of SOB and pneumonia Dependent on oxygen @ 5 LPM via [place] Pt alert oriented x 4 Skin warm dry intact

## 2017-11-26 NOTE — ED PROVIDER NOTES
EMERGENCY DEPARTMENT HISTORY AND PHYSICAL EXAM      Date: 11/26/2017  Patient Name: Sade Nails    History of Presenting Illness     Chief Complaint   Patient presents with    Shortness of Breath     The patient presents to the Emergency Department via EMS with complaints of shortness of breath. Patient states that her PCP has been treating her for pneumonia and she finished her antibiotics this past Monday. History Provided By: Patient    HPI: Sade Nails, 41 y.o. female with PMHx significant for COPD, asthma, CHF, HTN, CAD with stent(s) placed, DM, and morbid obesity, presents via EMS to the ED with cc of progressively worsening SOB x 4 days. The patient also c/o associated chest tightness, nausea, an unproductive cough, and increased leg swelling x 4 days. She reports that her left leg is chronically more swollen than her right leg. She notes that her most recent nebulizer treatment was yesterday, and she denies recent use of her inhaler. She reports that she is on 5 L O2 at home. She also reports that she recently finished a course of Prednisone 1 week ago. She denies taking any of her prescribed medications this morning. She also denies routine Pulmonology follow up. She specifically denies fevers, chills, vomiting, or other complaints att his time. PCP: Darrion Spivey MD   Cardiology: Luan Washintgon MD    Current Outpatient Prescriptions   Medication Sig Dispense Refill    predniSONE (STERAPRED DS) 10 mg dose pack Take as directed 48 Tab 0    benzonatate (TESSALON) 100 mg capsule Take 1 Cap by mouth three (3) times daily as needed for Cough. 15 Cap 0    docusate sodium (COLACE) 100 mg capsule Take 1 Cap by mouth two (2) times daily as needed for Constipation for up to 90 days. 60 Cap 0    levoFLOXacin (LEVAQUIN) 750 mg tablet Take 1 Tab by mouth daily.  Indications: multifocal pneumonia 7 Tab 0    fluticasone (FLONASE) 50 mcg/actuation nasal spray 2 Sprays by Both Nostrils route daily. 1 Bottle 0    l.acidoph-B.lactis-B.longum (FLORAJEN3) 460 mg (7.5-6- 1.5 bill. cell) cap cap Take 1 Cap by mouth Daily (before breakfast). 7 Cap 0    albuterol (PROVENTIL VENTOLIN) 2.5 mg /3 mL (0.083 %) nebulizer solution 3 mL by Nebulization route every four (4) hours as needed for Wheezing. 24 Each 0    furosemide (LASIX) 40 mg tablet Take 40 mg by mouth daily.  lovastatin (MEVACOR) 40 mg tablet Take 1 Tab by mouth nightly. 30 Tab 6    clotrimazole (LOTRIMIN) 1 % topical cream Apply to the affected areas twice daily until healed 15 g 0    lisinopril (PRINIVIL, ZESTRIL) 40 mg tablet Take 40 mg by mouth daily.  glyBURIDE (DIABETA) 5 mg tablet Take 5 mg by mouth Daily (before breakfast).  cetirizine (ZYRTEC) 10 mg tablet Take 10 mg by mouth daily as needed for Allergies.  omeprazole (PRILOSEC) 40 mg capsule Take 40 mg by mouth daily.  ketoconazole (NIZORAL) 2 % topical cream Apply  to affected area daily.  ammonium lactate (LAC-HYDRIN) 12 % topical cream rub in to affected area well 280 g 1    aspirin delayed-release 81 mg tablet Take 81 mg by mouth daily.  metoprolol (LOPRESSOR) 25 mg tablet Take 25 mg by mouth two (2) times a day.  nitroglycerin (NITROSTAT) 0.4 mg SL tablet 1 Tab by SubLINGual route every five (5) minutes as needed for Chest Pain (call 911 if not relieved by 3). 25 Tab 2    albuterol (PROVENTIL, VENTOLIN) 90 mcg/actuation inhaler Take 1-2 Puffs by inhalation every four (4) hours as needed for Wheezing.  17 g 0       Past History     Past Medical History:  Past Medical History:   Diagnosis Date    Arthritis     Asthma     CAD (coronary artery disease)     Congestive heart failure (HCC)     COPD (chronic obstructive pulmonary disease) (HCC)     [facility]     Hypertension     Morbid obesity with BMI of 70 and over, adult ([facility]     NSTEMI (non-ST elevated myocardial infarction) ([facility]     SVT (supraventricular tachycardia) ([facility] Past Surgical History:  Past Surgical History:   Procedure Laterality Date    CARDIAC SURG PROCEDURE UNLIST      Stents    HX  SECTION      HX ORTHOPAEDIC      HX OTHER SURGICAL      cyst removed from back       Family History:  Family History   Problem Relation Age of Onset    Heart Disease Mother     Heart Disease Father     Heart Disease Sister        Social History:  Social History   Substance Use Topics    Smoking status: Current Every Day Smoker     Packs/day: 0.25     Types: Cigarettes    Smokeless tobacco: Never Used    Alcohol use No       Allergies:  No Known Allergies      Review of Systems   Review of Systems   Constitutional: Negative for activity change, chills and fever. HENT: Negative for congestion and sore throat. Eyes: Negative for pain and redness. Respiratory: Positive for cough, chest tightness and shortness of breath. Cardiovascular: Positive for leg swelling. Negative for chest pain and palpitations. Gastrointestinal: Positive for nausea. Negative for abdominal pain, diarrhea and vomiting. Genitourinary: Negative for dysuria, frequency and urgency. Musculoskeletal: Negative for back pain and neck pain. Skin: Negative for rash. Neurological: Negative for syncope, light-headedness and headaches. Psychiatric/Behavioral: Negative for confusion. Physical Exam   Physical Exam   Constitutional: She is oriented to person, place, and time. She appears well-developed. No distress. Morbidly obese. HENT:   Head: Normocephalic. Nose: Nose normal.   Mouth/Throat: Oropharynx is clear and moist. No oropharyngeal exudate. Eyes: Conjunctivae are normal. Pupils are equal, round, and reactive to light. No scleral icterus. Neck: Normal range of motion. Neck supple. No JVD present. No tracheal deviation present. No thyromegaly present. Cardiovascular: Normal rate, regular rhythm and intact distal pulses. Exam reveals no gallop and no friction rub. No murmur heard. Pulmonary/Chest: No stridor. No respiratory distress. She has wheezes. She has rales. BL expiratory wheezes. Rales. Mild increased work of breathing. Speaking in full sentences. Abdominal: Soft. Bowel sounds are normal. She exhibits no distension. There is no tenderness. There is no rebound and no guarding. Musculoskeletal: Normal range of motion. She exhibits no edema. Lymphadenopathy:     She has no cervical adenopathy. Neurological: She is alert and oriented to person, place, and time. No cranial nerve deficit. She exhibits normal muscle tone. Coordination normal.   Skin: Skin is warm and dry. No rash noted. She is not diaphoretic. No erythema. Psychiatric: She has a normal mood and affect. Her behavior is normal.   Nursing note and vitals reviewed. Diagnostic Study Results     Labs -     Recent Results (from the past 12 hour(s))   CBC WITH AUTOMATED DIFF    Collection Time: 11/26/17  1:30 PM   Result Value Ref Range    WBC 13.0 (H) 3.6 - 11.0 K/uL    RBC 4.45 3.80 - 5.20 M/uL    HGB 12.9 11.5 - 16.0 g/dL    HCT 42.3 35.0 - 47.0 %    MCV 95.1 80.0 - 99.0 FL    MCH 29.0 26.0 - 34.0 PG    MCHC 30.5 30.0 - 36.5 g/dL    RDW 14.7 (H) 11.5 - 14.5 %    PLATELET 202 182 - 731 K/uL    NEUTROPHILS 79 (H) 32 - 75 %    LYMPHOCYTES 12 12 - 49 %    MONOCYTES 7 5 - 13 %    EOSINOPHILS 2 0 - 7 %    BASOPHILS 0 0 - 1 %    ABS. NEUTROPHILS 10.4 (H) 1.8 - 8.0 K/UL    ABS. LYMPHOCYTES 1.5 0.8 - 3.5 K/UL    ABS. MONOCYTES 0.9 0.0 - 1.0 K/UL    ABS. EOSINOPHILS 0.2 0.0 - 0.4 K/UL    ABS.  BASOPHILS 0.0 0.0 - 0.1 K/UL   METABOLIC PANEL, COMPREHENSIVE    Collection Time: 11/26/17  1:30 PM   Result Value Ref Range    Sodium 140 136 - 145 mmol/L    Potassium 4.3 3.5 - 5.1 mmol/L    Chloride 104 97 - 108 mmol/L    CO2 33 (H) 21 - 32 mmol/L    Anion gap 3 (L) 5 - 15 mmol/L    Glucose 76 65 - 100 mg/dL    BUN 14 6 - 20 MG/DL    Creatinine 0.72 0.55 - 1.02 MG/DL    BUN/Creatinine ratio 19 12 - 20      GFR est AA >60 >60 ml/min/1.73m2    GFR est non-AA >60 >60 ml/min/1.73m2    Calcium 10.3 (H) 8.5 - 10.1 MG/DL    Bilirubin, total 0.5 0.2 - 1.0 MG/DL    ALT (SGPT) 18 12 - 78 U/L    AST (SGOT) 14 (L) 15 - 37 U/L    Alk. phosphatase 86 45 - 117 U/L    Protein, total 8.0 6.4 - 8.2 g/dL    Albumin 3.2 (L) 3.5 - 5.0 g/dL    Globulin 4.8 (H) 2.0 - 4.0 g/dL    A-G Ratio 0.7 (L) 1.1 - 2.2     NT-PRO BNP    Collection Time: 11/26/17  1:30 PM   Result Value Ref Range    NT pro- (H) 0 - 125 PG/ML   TROPONIN I    Collection Time: 11/26/17  1:30 PM   Result Value Ref Range    Troponin-I, Qt. <0.04 <0.05 ng/mL       Radiologic Studies -   CXR Results  (Last 48 hours)               11/26/17 1334  XR CHEST PORT Final result    Impression:  IMPRESSION: No acute cardiopulmonary disease. Narrative:  INDICATION: Chest pain. Portable AP upright view of the chest.       Direct comparison made to prior chest x-ray dated 9/22/2017. Cardiomediastinal silhouette is stable. Lungs are grossly clear bilaterally. Pleural spaces are normal. Osseous structures are intact. Medical Decision Making   I am the first provider for this patient. I reviewed the vital signs, available nursing notes, past medical history, past surgical history, family history and social history. Vital Signs-Reviewed the patient's vital signs. Patient Vitals for the past 12 hrs:   Temp Pulse Resp BP SpO2   11/26/17 1445 - - 18 118/72 93 %   11/26/17 1415 - - 22 106/69 97 %   11/26/17 1233 98 °F (36.7 °C) 81 20 118/72 96 %       Pulse Oximetry Analysis - 95% on 4.5 L of O2. Records Reviewed: Nursing Notes and Old Medical Records    Provider Notes (Medical Decision Making):   DDx: COPD exacerbation, CHF exacerbation, ACS, PNA. ED Course:   Initial assessment performed. The patients presenting problems have been discussed, and they are in agreement with the care plan formulated and outlined with them.   I have encouraged them to ask questions as they arise throughout their visit. Progress Note:  3:25 PM  The patient was re-evaluated and she reports that she is feeling better at this time. Pt was informed of her available results, and she conveys her understanding of these results. Clear cxr; negative trop; sx improved with nebs and steroids; baseline room air sats; Will discharge. Written by Riya Green, ED Scribe, as dictated by Keith Barrow MD.    Critical Care Time: 0 minutes    Discharge Note:  3:31 PM  The pt is ready for discharge. The pt's signs, symptoms, diagnosis, and discharge instructions have been discussed and pt has conveyed their understanding. The pt is to follow up as recommended or return to ER should their symptoms worsen. Plan has been discussed and pt is in agreement. PLAN:  1. Current Discharge Medication List      START taking these medications    Details   predniSONE (STERAPRED DS) 10 mg dose pack Take as directed  Qty: 48 Tab, Refills: 0         STOP taking these medications       predniSONE (DELTASONE) 20 mg tablet Comments:   Reason for Stoppin.   Follow-up Information     Follow up With Details Comments Contact Info    Lino Ferrell MD Schedule an appointment as soon as possible for a visit in 1 day  [address]  [phone]      [facility] EMERGENCY DEPT Go in 1 day If symptoms worsen [address] [place] [address]  [phone]        Return to ED if worse     Diagnosis     Clinical Impression:   1. Moderate persistent asthma with acute exacerbation        Attestations: This note is prepared by Riya Green, acting as a Scribe for Keith Barrow MD.    Keith Barrow MD: The scribe's documentation has been prepared under my direction and personally reviewed by me in its entirety.  I confirm that the notes above accurately reflects all work, treatment, procedures, and medical decision making performed by me. This note will not be viewable in [address]

## 2017-11-27 NOTE — ED NOTES
Bedside and Verbal shift change report given to Prince Carmona RN  (oncoming nurse) by Rin Seo RN  (offgoing nurse). Report included the following information SBAR, Kardex, ED Summary, Intake/Output, MAR, Recent Results and Med Rec Status.

## 2018-01-15 ENCOUNTER — HOSPITAL ENCOUNTER (EMERGENCY)
Age: 48
Discharge: HOME | End: 2018-01-16
Attending: EMERGENCY MEDICINE | Admitting: EMERGENCY MEDICINE
Payer: MEDICARE

## 2018-01-15 ENCOUNTER — APPOINTMENT (OUTPATIENT)
Dept: GENERAL RADIOLOGY | Age: 48
End: 2018-01-15
Attending: EMERGENCY MEDICINE
Payer: MEDICARE

## 2018-01-15 VITALS
SYSTOLIC BLOOD PRESSURE: 130 MMHG | HEIGHT: 66 IN | HEART RATE: 110 BPM | OXYGEN SATURATION: 90 % | DIASTOLIC BLOOD PRESSURE: 82 MMHG | RESPIRATION RATE: 18 BRPM | TEMPERATURE: 98.4 F

## 2018-01-15 DIAGNOSIS — J44.1 COPD EXACERBATION (HCC): Primary | ICD-10-CM

## 2018-01-15 LAB
ALBUMIN SERPL-MCNC: 3.3 G/DL (ref 3.5–5)
ALBUMIN/GLOB SERPL: 0.6 {RATIO} (ref 1.1–2.2)
ALP SERPL-CCNC: 112 U/L (ref 45–117)
ALT SERPL-CCNC: 18 U/L (ref 12–78)
ANION GAP SERPL CALC-SCNC: 1 MMOL/L (ref 5–15)
AST SERPL-CCNC: 16 U/L (ref 15–37)
BASOPHILS # BLD: 0 K/UL (ref 0–0.1)
BASOPHILS NFR BLD: 0 % (ref 0–1)
BILIRUB SERPL-MCNC: 0.4 MG/DL (ref 0.2–1)
BUN SERPL-MCNC: 12 MG/DL (ref 6–20)
BUN/CREAT SERPL: 16 (ref 12–20)
CALCIUM SERPL-MCNC: 10.6 MG/DL (ref 8.5–10.1)
CHLORIDE SERPL-SCNC: 101 MMOL/L (ref 97–108)
CO2 SERPL-SCNC: 37 MMOL/L (ref 21–32)
CREAT SERPL-MCNC: 0.73 MG/DL (ref 0.55–1.02)
EOSINOPHIL # BLD: 0.3 K/UL (ref 0–0.4)
EOSINOPHIL NFR BLD: 2 % (ref 0–7)
ERYTHROCYTE [DISTWIDTH] IN BLOOD BY AUTOMATED COUNT: 14.1 % (ref 11.5–14.5)
GLOBULIN SER CALC-MCNC: 5.4 G/DL (ref 2–4)
GLUCOSE SERPL-MCNC: 78 MG/DL (ref 65–100)
HCT VFR BLD AUTO: 45.5 % (ref 35–47)
HGB BLD-MCNC: 13.8 G/DL (ref 11.5–16)
LYMPHOCYTES # BLD: 1.3 K/UL (ref 0.8–3.5)
LYMPHOCYTES NFR BLD: 10 % (ref 12–49)
MCH RBC QN AUTO: 29 PG (ref 26–34)
MCHC RBC AUTO-ENTMCNC: 30.3 G/DL (ref 30–36.5)
MCV RBC AUTO: 95.6 FL (ref 80–99)
MONOCYTES # BLD: 0.9 K/UL (ref 0–1)
MONOCYTES NFR BLD: 7 % (ref 5–13)
NEUTS SEG # BLD: 9.9 K/UL (ref 1.8–8)
NEUTS SEG NFR BLD: 81 % (ref 32–75)
PLATELET # BLD AUTO: 198 K/UL (ref 150–400)
POTASSIUM SERPL-SCNC: 4.1 MMOL/L (ref 3.5–5.1)
PROT SERPL-MCNC: 8.7 G/DL (ref 6.4–8.2)
RBC # BLD AUTO: 4.76 M/UL (ref 3.8–5.2)
SODIUM SERPL-SCNC: 139 MMOL/L (ref 136–145)
TROPONIN I SERPL-MCNC: <0.04 NG/ML
WBC # BLD AUTO: 12.3 K/UL (ref 3.6–11)

## 2018-01-15 PROCEDURE — 71045 X-RAY EXAM CHEST 1 VIEW: CPT

## 2018-01-15 PROCEDURE — 36415 COLL VENOUS BLD VENIPUNCTURE: CPT | Performed by: EMERGENCY MEDICINE

## 2018-01-15 PROCEDURE — 84484 ASSAY OF TROPONIN QUANT: CPT | Performed by: EMERGENCY MEDICINE

## 2018-01-15 PROCEDURE — 77030029684 HC NEB SM VOL KT MONA -A

## 2018-01-15 PROCEDURE — 94640 AIRWAY INHALATION TREATMENT: CPT

## 2018-01-15 PROCEDURE — 74011250636 HC RX REV CODE- 250/636: Performed by: EMERGENCY MEDICINE

## 2018-01-15 PROCEDURE — 96374 THER/PROPH/DIAG INJ IV PUSH: CPT

## 2018-01-15 PROCEDURE — 85025 COMPLETE CBC W/AUTO DIFF WBC: CPT | Performed by: EMERGENCY MEDICINE

## 2018-01-15 PROCEDURE — 99284 EMERGENCY DEPT VISIT MOD MDM: CPT

## 2018-01-15 PROCEDURE — 74011000250 HC RX REV CODE- 250: Performed by: EMERGENCY MEDICINE

## 2018-01-15 PROCEDURE — 80053 COMPREHEN METABOLIC PANEL: CPT | Performed by: EMERGENCY MEDICINE

## 2018-01-15 RX ORDER — FUROSEMIDE 10 MG/ML
80 INJECTION INTRAMUSCULAR; INTRAVENOUS
Status: COMPLETED | OUTPATIENT
Start: 2018-01-15 | End: 2018-01-15

## 2018-01-15 RX ORDER — IPRATROPIUM BROMIDE AND ALBUTEROL SULFATE 2.5; .5 MG/3ML; MG/3ML
3 SOLUTION RESPIRATORY (INHALATION) ONCE
Status: COMPLETED | OUTPATIENT
Start: 2018-01-15 | End: 2018-01-15

## 2018-01-15 RX ADMIN — FUROSEMIDE 80 MG: 10 INJECTION, SOLUTION INTRAMUSCULAR; INTRAVENOUS at 18:17

## 2018-01-15 RX ADMIN — IPRATROPIUM BROMIDE AND ALBUTEROL SULFATE 3 ML: .5; 3 SOLUTION RESPIRATORY (INHALATION) at 20:04

## 2018-01-15 RX ADMIN — IPRATROPIUM BROMIDE AND ALBUTEROL SULFATE 3 ML: .5; 3 SOLUTION RESPIRATORY (INHALATION) at 16:40

## 2018-01-15 NOTE — ED PROVIDER NOTES
EMERGENCY DEPARTMENT HISTORY AND PHYSICAL EXAM      Date: 1/15/2018  Patient Name: Mario Elise    History of Presenting Illness     Chief Complaint   Patient presents with    Shortness of Breath     reports SOB x3 days, and \"unable to tolerate oxygen\" so has turned it down from 5lpm to 3lpm    Leg Swelling     History Provided By: Patient    HPI: Mario Elise, 41 y.o. female with PMHx significant for asthma, COPD, CHF, arthritis, CAD, and DM, presents ambulatory to the ED with cc of worsening SOB and worsening lower extremity edema L>R that has been ongoing for some time. Pt has a history of multiple ER visits for similar sxs. She is normally on 5L NC but states that she reduced it to 3L because she was \"unable to tolerate the oxygen\". She states that the oxygen \"rushes to her head\" and gives her a headache. Pt reports \"fluid in her lungs and legs\". She also notes drainage from her left leg. Pt is on Lasix 40mg. Pt reports that her PCP is concerned about increasing her Lasix due to pt's blood pressure. Pt specifically denies chest pain, cough, fevers, or chills. PCP: Juliette العلي MD     Social Hx: + Tobacco, - EtOH, - Illicit Drugs    There are no other complaints, changes, or physical findings at this time. Current Facility-Administered Medications   Medication Dose Route Frequency Provider Last Rate Last Dose    albuterol-ipratropium (DUO-NEB) 2.5 MG-0.5 MG/3 ML  3 mL Nebulization ONCE Collin Richardson MD         Current Outpatient Prescriptions   Medication Sig Dispense Refill    furosemide (LASIX) 40 mg tablet Take 40 mg by mouth daily.  predniSONE (STERAPRED DS) 10 mg dose pack Take as directed 48 Tab 0    azithromycin (ZITHROMAX Z-BIANCA) 250 mg tablet Take as directed 6 Tab 0    benzonatate (TESSALON) 100 mg capsule Take 1 Cap by mouth three (3) times daily as needed for Cough. 15 Cap 0    fluticasone (FLONASE) 50 mcg/actuation nasal spray 2 Sprays by Both Nostrils route daily. 1 Bottle 0    l.acidoph-B.lactis-B.longum (FLORAJEN3) 460 mg (7.5-6- 1.5 bill. cell) cap cap Take 1 Cap by mouth Daily (before breakfast). 7 Cap 0    albuterol (PROVENTIL VENTOLIN) 2.5 mg /3 mL (0.083 %) nebulizer solution 3 mL by Nebulization route every four (4) hours as needed for Wheezing. 24 Each 0    lovastatin (MEVACOR) 40 mg tablet Take 1 Tab by mouth nightly. 30 Tab 6    clotrimazole (LOTRIMIN) 1 % topical cream Apply to the affected areas twice daily until healed 15 g 0    lisinopril (PRINIVIL, ZESTRIL) 40 mg tablet Take 40 mg by mouth daily.  glyBURIDE (DIABETA) 5 mg tablet Take 5 mg by mouth Daily (before breakfast).  cetirizine (ZYRTEC) 10 mg tablet Take 10 mg by mouth daily as needed for Allergies.  omeprazole (PRILOSEC) 40 mg capsule Take 40 mg by mouth daily.  ketoconazole (NIZORAL) 2 % topical cream Apply  to affected area daily.  ammonium lactate (LAC-HYDRIN) 12 % topical cream rub in to affected area well 280 g 1    aspirin delayed-release 81 mg tablet Take 81 mg by mouth daily.  metoprolol (LOPRESSOR) 25 mg tablet Take 25 mg by mouth two (2) times a day.  nitroglycerin (NITROSTAT) 0.4 mg SL tablet 1 Tab by SubLINGual route every five (5) minutes as needed for Chest Pain (call 911 if not relieved by 3). 25 Tab 2    albuterol (PROVENTIL, VENTOLIN) 90 mcg/actuation inhaler Take 1-2 Puffs by inhalation every four (4) hours as needed for Wheezing.  17 g 0       Past History     Past Medical History:  Past Medical History:   Diagnosis Date    Arthritis     Asthma     CAD (coronary artery disease)     Congestive heart failure (HCC)     COPD (chronic obstructive pulmonary disease) (HCC)     [facility]     Hypertension     Morbid obesity with BMI of 70 and over, adult ([facility]     NSTEMI (non-ST elevated myocardial infarction) ([facility]     SVT (supraventricular tachycardia) ([facility]      Past Surgical History:  Past Surgical History:   Procedure Laterality Date    CARDIAC SURG PROCEDURE UNLIST      Stents    HX  SECTION      HX ORTHOPAEDIC      HX OTHER SURGICAL      cyst removed from back     Family History:  Family History   Problem Relation Age of Onset    Heart Disease Mother     Heart Disease Father     Heart Disease Sister      Social History:  Social History   Substance Use Topics    Smoking status: Current Every Day Smoker     Packs/day: 0.25     Types: Cigarettes    Smokeless tobacco: Never Used    Alcohol use No     Allergies:  No Known Allergies    Review of Systems   Review of Systems   Constitutional: Negative for activity change, chills and fever. HENT: Negative for congestion and sore throat. Eyes: Negative for pain and redness. Respiratory: Positive for shortness of breath. Negative for cough and chest tightness. Cardiovascular: Positive for leg swelling (L>R with drainage). Negative for chest pain and palpitations. Gastrointestinal: Negative for abdominal pain, diarrhea, nausea and vomiting. Genitourinary: Negative for dysuria, frequency and urgency. Musculoskeletal: Negative for back pain and neck pain. Skin: Negative for rash. Neurological: Negative for syncope, light-headedness and headaches. Psychiatric/Behavioral: Negative for confusion. All other systems reviewed and are negative. Physical Exam   Physical Exam   Constitutional: She is oriented to person, place, and time. She appears well-developed. No distress. Supermorbidly obese   HENT:   Head: Normocephalic and atraumatic. Nose: Nose normal.   Mouth/Throat: Oropharynx is clear and moist. No oropharyngeal exudate. Eyes: Conjunctivae are normal. Pupils are equal, round, and reactive to light. No scleral icterus. Neck: Normal range of motion. Neck supple. No JVD present. No tracheal deviation present. No thyromegaly present. Cardiovascular: Normal rate, regular rhythm and intact distal pulses. Exam reveals no gallop and no friction rub. No murmur heard. Pulmonary/Chest: Effort normal and breath sounds normal. No stridor. No respiratory distress. She has no wheezes. She has no rales. Abdominal: Soft. Bowel sounds are normal. She exhibits no distension. There is no tenderness. There is no rebound and no guarding. Musculoskeletal: Normal range of motion. She exhibits edema. Massive bilateral lower extremity edema L>R with chronic skin changes;    Lymphadenopathy:     She has no cervical adenopathy. Neurological: She is alert and oriented to person, place, and time. No cranial nerve deficit. She exhibits normal muscle tone. Coordination normal.   Skin: Skin is warm and dry. No rash noted. She is not diaphoretic. No erythema. Psychiatric: She has a normal mood and affect. Her behavior is normal.   Nursing note and vitals reviewed. Diagnostic Study Results     Labs -  Recent Results (from the past 12 hour(s))   CBC WITH AUTOMATED DIFF    Collection Time: 01/15/18  6:19 PM   Result Value Ref Range    WBC 12.3 (H) 3.6 - 11.0 K/uL    RBC 4.76 3.80 - 5.20 M/uL    HGB 13.8 11.5 - 16.0 g/dL    HCT 45.5 35.0 - 47.0 %    MCV 95.6 80.0 - 99.0 FL    MCH 29.0 26.0 - 34.0 PG    MCHC 30.3 30.0 - 36.5 g/dL    RDW 14.1 11.5 - 14.5 %    PLATELET 732 326 - 344 K/uL    NEUTROPHILS 81 (H) 32 - 75 %    LYMPHOCYTES 10 (L) 12 - 49 %    MONOCYTES 7 5 - 13 %    EOSINOPHILS 2 0 - 7 %    BASOPHILS 0 0 - 1 %    ABS. NEUTROPHILS 9.9 (H) 1.8 - 8.0 K/UL    ABS. LYMPHOCYTES 1.3 0.8 - 3.5 K/UL    ABS. MONOCYTES 0.9 0.0 - 1.0 K/UL    ABS. EOSINOPHILS 0.3 0.0 - 0.4 K/UL    ABS.  BASOPHILS 0.0 0.0 - 0.1 K/UL   METABOLIC PANEL, COMPREHENSIVE    Collection Time: 01/15/18  6:19 PM   Result Value Ref Range    Sodium 139 136 - 145 mmol/L    Potassium 4.1 3.5 - 5.1 mmol/L    Chloride 101 97 - 108 mmol/L    CO2 37 (H) 21 - 32 mmol/L    Anion gap 1 (L) 5 - 15 mmol/L    Glucose 78 65 - 100 mg/dL    BUN 12 6 - 20 MG/DL    Creatinine 0.73 0.55 - 1.02 MG/DL    BUN/Creatinine ratio 16 12 - 20      GFR est AA >60 >60 ml/min/1.73m2    GFR est non-AA >60 >60 ml/min/1.73m2    Calcium 10.6 (H) 8.5 - 10.1 MG/DL    Bilirubin, total 0.4 0.2 - 1.0 MG/DL    ALT (SGPT) 18 12 - 78 U/L    AST (SGOT) 16 15 - 37 U/L    Alk. phosphatase 112 45 - 117 U/L    Protein, total 8.7 (H) 6.4 - 8.2 g/dL    Albumin 3.3 (L) 3.5 - 5.0 g/dL    Globulin 5.4 (H) 2.0 - 4.0 g/dL    A-G Ratio 0.6 (L) 1.1 - 2.2     TROPONIN I    Collection Time: 01/15/18  6:19 PM   Result Value Ref Range    Troponin-I, Qt. <0.04 <0.05 ng/mL       Radiologic Studies -     CXR Results  (Last 48 hours)               01/15/18 1641  XR CHEST PORT Final result    Impression:  IMPRESSION:       No significant change. No focal consolidation. Narrative:  history: Acute shortness of breath for 3 days with leg swelling       COMPARISON: 11/26/2017       FINDINGS:       Frontal chest radiograph submitted for review. Unchanged cardiomegaly and prominent royal. Diffuse interstitial prominence may   be chronic. No focal consolidation. No significant effusion. No pneumothorax. Medical Decision Making   I am the first provider for this patient. I reviewed the vital signs, available nursing notes, past medical history, past surgical history, family history and social history. Vital Signs-Reviewed the patient's vital signs. Patient Vitals for the past 12 hrs:   Temp Pulse Resp BP SpO2   01/15/18 1817 - 98 - 145/87 -   01/15/18 1610 98.4 °F (36.9 °C) 96 18 131/84 94 %       Records Reviewed: Nursing Notes and Old Medical Records    Provider Notes (Medical Decision Making):   DDx: COPD, CHF, lymphedema     ED Course:   Initial assessment performed. The patients presenting problems have been discussed, and they are in agreement with the care plan formulated and outlined with them. I have encouraged them to ask questions as they arise throughout their visit.     SIGN OUT:  6:30 PM  Patient's presentation, labs/imaging and plan of care was reviewed with Jessica Sims DO as part of sign out. They will await labs and disposition appropriately as part of the plan discussed with the patient. Jessica Sims DO's assistance in completion of this plan is greatly appreciated but it should be noted that I will be the provider of record for this patient. Jeni Hall MD    Attestation: This note is prepared by Jana Page, acting as MD Jeni Lozoya MD: The scribe's documentation has been prepared under my direction and personally reviewed by me in its entirety. I confirm that the note above accurately reflects all work, treatment, procedures, and medical decision making performed by me. Disposition:  DISCHARGE NOTE  7:49 PM  The patient has been re-evaluated and is ready for discharge. Reviewed available results with patient. Counseled patient on diagnosis and care plan. Patient has expressed understanding, and all questions have been answered. Patient agrees with plan and agrees to follow up as recommended, or return to the ED if their symptoms worsen. Discharge instructions have been provided and explained to the patient, along with reasons to return to the ED. PLAN:  1. Discharge  Follow-up Information     Follow up With Details Comments [facility] [place] [address] [place], [place] Schedule an appointment as soon as possible for a visit  [address]  [phone]      [facility] EMERGENCY DEPT  As needed, If symptoms worsen [address] [place] [address]  [phone]        Return to ED if worse     Diagnosis     Clinical Impression:   1. COPD exacerbation ([facility]        Attestations:    Attestation: This note is prepared by Jana Page, acting as MD Jeni Lozoya MD: The scribe's documentation has been prepared under my direction and personally reviewed by me in its entirety.  I confirm that the note above accurately reflects all work, treatment, procedures, and medical decision making performed by me.

## 2018-01-16 NOTE — ED NOTES
Assumed care of patient from EMS. Patient is A&Ox4, respirations even and unlabored, though patient reports worsening SOB for several days. Pt. States she also has increased swelling in bilateral LE. Patient notes that she normally wears 5lpm O2 via NC at home, but that \"it's too much\" and that she has turned it down to 3lpm for the last several days. Pt. Placed in chair in POC, monitor x3, call light within reach.

## 2018-01-16 NOTE — DISCHARGE INSTRUCTIONS
Chronic Obstructive Pulmonary Disease (COPD): Care Instructions  Your Care Instructions    Chronic obstructive pulmonary disease (COPD) is a general term for a group of lung diseases, including emphysema and chronic bronchitis. People with COPD have decreased airflow in and out of the lungs, which makes it hard to breathe. The airways also can get clogged with thick mucus. Cigarette smoking is a major cause of COPD. Although there is no cure for COPD, you can slow its progress. Following your treatment plan and taking care of yourself can help you feel better and live longer. Follow-up care is a key part of your treatment and safety. Be sure to make and go to all appointments, and call your doctor if you are having problems. It's also a good idea to know your test results and keep a list of the medicines you take. How can you care for yourself at home? ?Staying healthy  ? · Do not smoke. This is the most important step you can take to prevent more damage to your lungs. If you need help quitting, talk to your doctor about stop-smoking programs and medicines. These can increase your chances of quitting for good. ? · Avoid colds and flu. Get a pneumococcal vaccine shot. If you have had one before, ask your doctor whether you need a second dose. Get the flu vaccine every fall. If you must be around people with colds or the flu, wash your hands often. ? · Avoid secondhand smoke, air pollution, and high altitudes. Also avoid cold, dry air and hot, humid air. Stay at home with your windows closed when air pollution is bad. ?Medicines and oxygen therapy  ? · Take your medicines exactly as prescribed. Call your doctor if you think you are having a problem with your medicine. ? · You may be taking medicines such as:  ¨ Bronchodilators. These help open your airways and make breathing easier. Bronchodilators are either short-acting (work for 6 to 9 hours) or long-acting (work for 24 hours).  You inhale most bronchodilators, so they start to act quickly. Always carry your quick-relief inhaler with you in case you need it while you are away from home. ¨ Corticosteroids (prednisone, budesonide). These reduce airway inflammation. They come in pill or inhaled form. You must take these medicines every day for them to work well. ? · A spacer may help you get more inhaled medicine to your lungs. Ask your doctor or pharmacist if a spacer is right for you. If it is, ask how to use it properly. ? · Do not take any vitamins, over-the-counter medicine, or herbal products without talking to your doctor first.   ? · If your doctor prescribed antibiotics, take them as directed. Do not stop taking them just because you feel better. You need to take the full course of antibiotics. ? · Oxygen therapy boosts the amount of oxygen in your blood and helps you breathe easier. Use the flow rate your doctor has recommended, and do not change it without talking to your doctor first.   Activity  ? · Get regular exercise. Walking is an easy way to get exercise. Start out slowly, and walk a little more each day. ? · Pay attention to your breathing. You are exercising too hard if you cannot talk while you are exercising. ? · Take short rest breaks when doing household chores and other activities. ? · Learn breathing methods-such as breathing through pursed lips-to help you become less short of breath. ? · If your doctor has not set you up with a pulmonary rehabilitation program, talk to him or her about whether rehab is right for you. Rehab includes exercise programs, education about your disease and how to manage it, help with diet and other changes, and emotional support. Diet  ? · Eat regular, healthy meals. Use bronchodilators about 1 hour before you eat to make it easier to eat. Eat several small meals instead of three large ones. Drink beverages at the end of the meal. Avoid foods that are hard to chew.    ? · Eat foods that contain protein so that you do not lose muscle mass. ? · Talk with your doctor if you gain too much weight or if you lose weight without trying. ?Mental health  ? · Talk to your family, friends, or a therapist about your feelings. It is normal to feel frightened, angry, hopeless, helpless, and even guilty. Talking openly about bad feelings can help you cope. If these feelings last, talk to your doctor. When should you call for help? Call 911 anytime you think you may need emergency care. For example, call if:  ? · You have severe trouble breathing. ?Call your doctor now or seek immediate medical care if:  ? · You have new or worse trouble breathing. ? · You cough up blood. ? · You have a fever. ? Watch closely for changes in your health, and be sure to contact your doctor if:  ? · You cough more deeply or more often, especially if you notice more mucus or a change in the color of your mucus. ? · You have new or worse swelling in your legs or belly. ? · You are not getting better as expected. Where can you learn more? Go to http://cassia-donte.info/. Kaushal Schrader in the search box to learn more about \"Chronic Obstructive Pulmonary Disease (COPD): Care Instructions. \"  Current as of: May 12, 2017  Content Version: 11.4  © 8308-2029 [employer]. Care instructions adapted under license by [employer] (which disclaims liability or warranty for this information). If you have questions about a medical condition or this instruction, always ask your healthcare professional. [address] any warranty or liability for your use of this information.

## 2018-01-16 NOTE — ED NOTES
Dr. Garay  has reviewed discharge instructions with the patient. The patient verbalized understanding. Pt. A&Ox4, respirations even and unlabored. VS stable as noted in flowsheet. [facility] ambulance transport from department with paperwork in hand.

## 2018-01-20 ENCOUNTER — APPOINTMENT (OUTPATIENT)
Dept: CT IMAGING | Age: 48
DRG: 189 | End: 2018-01-20
Attending: EMERGENCY MEDICINE
Payer: MEDICARE

## 2018-01-20 ENCOUNTER — APPOINTMENT (OUTPATIENT)
Dept: GENERAL RADIOLOGY | Age: 48
DRG: 189 | End: 2018-01-20
Attending: EMERGENCY MEDICINE
Payer: MEDICARE

## 2018-01-20 ENCOUNTER — HOSPITAL ENCOUNTER (INPATIENT)
Age: 48
LOS: 3 days | Discharge: HOME HEALTH | DRG: 189 | End: 2018-01-24
Attending: EMERGENCY MEDICINE | Admitting: INTERNAL MEDICINE
Payer: MEDICARE

## 2018-01-20 DIAGNOSIS — R07.89 OTHER CHEST PAIN: ICD-10-CM

## 2018-01-20 DIAGNOSIS — R06.00 DYSPNEA, UNSPECIFIED TYPE: ICD-10-CM

## 2018-01-20 DIAGNOSIS — J96.11 CHRONIC RESPIRATORY FAILURE WITH HYPOXIA (HCC): ICD-10-CM

## 2018-01-20 DIAGNOSIS — I95.9 TRANSIENT HYPOTENSION: Primary | ICD-10-CM

## 2018-01-20 DIAGNOSIS — R07.9 ACUTE CHEST PAIN: ICD-10-CM

## 2018-01-20 DIAGNOSIS — I27.23 PULMONARY HYPERTENSION DUE TO COPD (HCC): ICD-10-CM

## 2018-01-20 DIAGNOSIS — E66.2 OBESITY HYPOVENTILATION SYNDROME (HCC): ICD-10-CM

## 2018-01-20 DIAGNOSIS — J44.9 PULMONARY HYPERTENSION DUE TO COPD (HCC): ICD-10-CM

## 2018-01-20 LAB
ERYTHROCYTE [DISTWIDTH] IN BLOOD BY AUTOMATED COUNT: 14.3 % (ref 11.5–14.5)
HCT VFR BLD AUTO: 43.7 % (ref 35–47)
HGB BLD-MCNC: 13 G/DL (ref 11.5–16)
MCH RBC QN AUTO: 28 PG (ref 26–34)
MCHC RBC AUTO-ENTMCNC: 29.7 G/DL (ref 30–36.5)
MCV RBC AUTO: 94 FL (ref 80–99)
PLATELET # BLD AUTO: 219 K/UL (ref 150–400)
RBC # BLD AUTO: 4.65 M/UL (ref 3.8–5.2)
WBC # BLD AUTO: 11.9 K/UL (ref 3.6–11)

## 2018-01-20 PROCEDURE — 93005 ELECTROCARDIOGRAM TRACING: CPT

## 2018-01-20 PROCEDURE — 71275 CT ANGIOGRAPHY CHEST: CPT

## 2018-01-20 PROCEDURE — 99285 EMERGENCY DEPT VISIT HI MDM: CPT

## 2018-01-20 PROCEDURE — 77030029684 HC NEB SM VOL KT MONA -A

## 2018-01-20 PROCEDURE — 84484 ASSAY OF TROPONIN QUANT: CPT | Performed by: EMERGENCY MEDICINE

## 2018-01-20 PROCEDURE — 83880 ASSAY OF NATRIURETIC PEPTIDE: CPT | Performed by: EMERGENCY MEDICINE

## 2018-01-20 PROCEDURE — 36415 COLL VENOUS BLD VENIPUNCTURE: CPT | Performed by: EMERGENCY MEDICINE

## 2018-01-20 PROCEDURE — 85027 COMPLETE CBC AUTOMATED: CPT | Performed by: EMERGENCY MEDICINE

## 2018-01-20 PROCEDURE — 82550 ASSAY OF CK (CPK): CPT | Performed by: EMERGENCY MEDICINE

## 2018-01-20 PROCEDURE — 94640 AIRWAY INHALATION TREATMENT: CPT

## 2018-01-20 PROCEDURE — 94761 N-INVAS EAR/PLS OXIMETRY MLT: CPT

## 2018-01-20 PROCEDURE — 74011250636 HC RX REV CODE- 250/636: Performed by: EMERGENCY MEDICINE

## 2018-01-20 PROCEDURE — 87804 INFLUENZA ASSAY W/OPTIC: CPT | Performed by: EMERGENCY MEDICINE

## 2018-01-20 PROCEDURE — 71046 X-RAY EXAM CHEST 2 VIEWS: CPT

## 2018-01-20 PROCEDURE — 74011636320 HC RX REV CODE- 636/320: Performed by: EMERGENCY MEDICINE

## 2018-01-20 PROCEDURE — 80053 COMPREHEN METABOLIC PANEL: CPT | Performed by: EMERGENCY MEDICINE

## 2018-01-20 RX ORDER — SODIUM CHLORIDE 0.9 % (FLUSH) 0.9 %
10 SYRINGE (ML) INJECTION
Status: COMPLETED | OUTPATIENT
Start: 2018-01-20 | End: 2018-01-20

## 2018-01-20 RX ORDER — IPRATROPIUM BROMIDE AND ALBUTEROL SULFATE 2.5; .5 MG/3ML; MG/3ML
3 SOLUTION RESPIRATORY (INHALATION)
Status: COMPLETED | OUTPATIENT
Start: 2018-01-20 | End: 2018-01-21

## 2018-01-20 RX ORDER — SODIUM CHLORIDE 9 MG/ML
50 INJECTION, SOLUTION INTRAVENOUS
Status: COMPLETED | OUTPATIENT
Start: 2018-01-20 | End: 2018-01-21

## 2018-01-20 RX ADMIN — Medication 10 ML: at 23:48

## 2018-01-20 RX ADMIN — IOPAMIDOL 100 ML: 755 INJECTION, SOLUTION INTRAVENOUS at 23:48

## 2018-01-20 RX ADMIN — SODIUM CHLORIDE 50 ML/HR: 900 INJECTION, SOLUTION INTRAVENOUS at 23:48

## 2018-01-20 NOTE — IP AVS SNAPSHOT
[address] [facility] 
[phone] Patient: Yamileth Gore MRN: [id number] FUE:[id number] About your hospitalization You were admitted on:  January 21, 2018 You last received care in the:  \[facility]\"" 2 CARDIOPULMONARY CARE You were discharged on:  January 24, 2018 Why you were hospitalized Your primary diagnosis was:  Not on File Your diagnoses also included:  Chest Pain, Acute Respiratory Failure With Hypoxia And Hypercarbia (Hcc), Malignant Essential Hypertension, Acute Respiratory Failure (Hcc) Follow-up Information Follow up With Details Comments Contact Info Nadya Glover MD Schedule an appointment as soon as possible for a visit in 2 weeks  [address] 
[phone] Cecile Ba MD On 2/1/2018 The PCP office will call with a time [address] [facility] 97 1400 [address] [place] [address] 
[phone] [address]   [phone] [facility] Office on [place] [address] 
[phone] Discharge Orders None A check edwina indicates which time of day the medication should be taken. My Medications START taking these medications Instructions Each Dose to Equal  
 Morning Noon Evening Bedtime Nebulizer & Compressor machine 1 Each by Does Not Apply route daily as needed. 1 Each  
    
   
   
   
  
 traMADol 50 mg tablet Commonly known as:  ULTRAM  
   
 Take 1 Tab by mouth every eight (8) hours as needed. Max Daily Amount: 150 mg.  
 50 mg CHANGE how you take these medications Instructions Each Dose to Equal  
 Morning Noon Evening Bedtime * nystatin topical cream  
Commonly known as:  MYCOSTATIN What changed:  Another medication with the same name was added. Make sure you understand how and when to take each. Apply  to affected area two (2) times daily as needed for Skin Irritation or Itching. * nystatin powder Commonly known as:  MYCOSTATIN What changed: You were already taking a medication with the same name, and this prescription was added. Make sure you understand how and when to take each. Apply  to affected area two (2) times a day. * Notice: This list has 2 medication(s) that are the same as other medications prescribed for you. Read the directions carefully, and ask your doctor or other care provider to review them with you. CONTINUE taking these medications Instructions Each Dose to Equal  
 Morning Noon Evening Bedtime  
 albuterol 2.5 mg /3 mL (0.083 %) nebulizer solution Commonly known as:  PROVENTIL VENTOLIN  
   
 3 mL by Nebulization route every four (4) hours as needed for Wheezing. 2.5 mg  
    
   
   
   
  
 ammonium lactate 12 % topical cream  
Commonly known as:  LAC-HYDRIN  
   
 rub in to affected area well  
     
   
   
   
  
 aspirin delayed-release 81 mg tablet Take 81 mg by mouth daily. 81 mg  
    
  
   
   
   
  
 benzonatate 100 mg capsule Commonly known as:  TESSALON Take 1 Cap by mouth three (3) times daily as needed for Cough for up to 7 days. 100 mg  
    
   
   
   
  
 cetirizine 10 mg tablet Commonly known as:  ZYRTEC Take 10 mg by mouth daily as needed for Allergies. 10 mg  
    
   
   
   
  
 cyclobenzaprine 10 mg tablet Commonly known as:  FLEXERIL Take 10 mg by mouth two (2) times daily as needed for Muscle Spasm(s). Indications: Muscle Spasm  
 10 mg  
    
   
   
   
  
 fluticasone 50 mcg/actuation nasal spray Commonly known as:  Petey Lover 2 Sprays by Both Nostrils route daily. 2 Spray  
    
  
   
   
   
  
 furosemide 40 mg tablet Commonly known as:  LASIX Take 40 mg by mouth daily.   
 40 mg  
    
  
   
   
   
  
 glyBURIDE 5 mg tablet Commonly known as:  Vera Rule Take 5 mg by mouth Daily (before breakfast). 5 mg  
    
  
   
   
   
  
 hydrOXYzine pamoate 50 mg capsule Commonly known as:  VISTARIL Take 50 mg by mouth every eight (8) hours as needed for Itching. Indications: Pruritus of Skin 50 mg  
    
   
   
   
  
 ketoconazole 2 % topical cream  
Commonly known as:  NIZORAL Apply  to affected area daily. lisinopril 40 mg tablet Commonly known as:  Radha Best Take 40 mg by mouth daily. 40 mg  
    
  
   
   
   
  
 lovastatin 40 mg tablet Commonly known as:  MEVACOR Take 1 Tab by mouth nightly. 40 mg  
    
   
   
   
  
  
 metoprolol tartrate 25 mg tablet Commonly known as:  LOPRESSOR Take 25 mg by mouth two (2) times a day. 25 mg  
    
  
   
   
  
   
  
 nitroglycerin 0.4 mg SL tablet Commonly known as:  NITROSTAT  
   
 1 Tab by SubLINGual route every five (5) minutes as needed for Chest Pain (call 911 if not relieved by 3). 0.4 mg  
    
   
   
   
  
 omeprazole 40 mg capsule Commonly known as:  PRILOSEC Take 40 mg by mouth daily. 40 mg  
    
  
   
   
   
  
  
STOP taking these medications   
 albuterol 90 mcg/actuation inhaler Commonly known as:  Rebecca Stanford Where to Get Your Medications Information on where to get these meds will be given to you by the nurse or doctor. ! Ask your nurse or doctor about these medications  
  benzonatate 100 mg capsule Nebulizer & Compressor machine  
 nystatin powder  
 traMADol 50 mg tablet Discharge Instructions Shortness of Breath: Care Instructions Your Care Instructions Shortness of breath has many causes. Sometimes conditions such as anxiety can lead to shortness of breath.  Some people get mild shortness of breath when they exercise. Trouble breathing also can be a symptom of a serious problem, such as asthma, lung disease, emphysema, heart problems, and pneumonia. If your shortness of breath continues, you may need tests and treatment. Watch for any changes in your breathing and other symptoms. Follow-up care is a key part of your treatment and safety. Be sure to make and go to all appointments, and call your doctor if you are having problems. It's also a good idea to know your test results and keep a list of the medicines you take. How can you care for yourself at home? · Do not smoke or allow others to smoke around you. If you need help quitting, talk to your doctor about stop-smoking programs and medicines. These can increase your chances of quitting for good. · Get plenty of rest and sleep. · Take your medicines exactly as prescribed. Call your doctor if you think you are having a problem with your medicine. · Find healthy ways to deal with stress. ¨ Exercise daily. ¨ Get plenty of sleep. ¨ Eat regularly and well. When should you call for help? Call 911 anytime you think you may need emergency care. For example, call if: 
? · You have severe shortness of breath. ? · You have symptoms of a heart attack. These may include: ¨ Chest pain or pressure, or a strange feeling in the chest. 
¨ Sweating. ¨ Shortness of breath. ¨ Nausea or vomiting. ¨ Pain, pressure, or a strange feeling in the back, neck, jaw, or upper belly or in one or both shoulders or arms. ¨ Lightheadedness or sudden weakness. ¨ A fast or irregular heartbeat. After you call 911, the  may tell you to chew 1 adult-strength or 2 to 4 low-dose aspirin. Wait for an ambulance. Do not try to drive yourself. ?Call your doctor now or seek immediate medical care if: 
? · Your shortness of breath gets worse or you start to wheeze. Wheezing is a high-pitched sound when you breathe. ? · You wake up at night out of breath or have to prop your head up on several pillows to breathe. ? · You are short of breath after only light activity or while at rest. ? Watch closely for changes in your health, and be sure to contact your doctor if: 
? · You do not get better over the next 1 to 2 days. Where can you learn more? Go to http://cassia-donte.info/. Enter S780 in the search box to learn more about \"Shortness of Breath: Care Instructions. \" Current as of: May 12, 2017 Content Version: 11.4 © 9239-8127 Jixee. Care instructions adapted under license by Traiana (which disclaims liability or warranty for this information). If you have questions about a medical condition or this instruction, always ask your healthcare professional. [address] any warranty or liability for your use of this information. Low Sodium Diet (2,000 Milligram): Care Instructions Your Care Instructions Too much sodium causes your body to hold on to extra water. This can raise your blood pressure and force your heart and kidneys to work harder. In very serious cases, this could cause you to be put in the hospital. It might even be life-threatening. By limiting sodium, you will feel better and lower your risk of serious problems. The most common source of sodium is salt. People get most of the salt in their diet from canned, prepared, and packaged foods. Fast food and restaurant meals also are very high in sodium. Your doctor will probably limit your sodium to less than 2,000 milligrams (mg) a day. This limit counts all the sodium in prepared and packaged foods and any salt you add to your food. Follow-up care is a key part of your treatment and safety. Be sure to make and go to all appointments, and call your doctor if you are having problems. It's also a good idea to know your test results and keep a list of the medicines you take. How can you care for yourself at home? Read food labels · Read labels on cans and food packages. The labels tell you how much sodium is in each serving. Make sure that you look at the serving size. If you eat more than the serving size, you have eaten more sodium. · Food labels also tell you the Percent Daily Value for sodium. Choose products with low Percent Daily Values for sodium. · Be aware that sodium can come in forms other than salt, including monosodium glutamate (MSG), sodium citrate, and sodium bicarbonate (baking soda). MSG is often added to Asian food. When you eat out, you can sometimes ask for food without MSG or added salt. Buy low-sodium foods · Buy foods that are labeled \"unsalted\" (no salt added), \"sodium-free\" (less than 5 mg of sodium per serving), or \"low-sodium\" (less than 140 mg of sodium per serving). Foods labeled \"reduced-sodium\" and \"light sodium\" may still have too much sodium. Be sure to read the label to see how much sodium you are getting. · Buy fresh vegetables, or frozen vegetables without added sauces. Buy low-sodium versions of canned vegetables, soups, and other canned goods. Prepare low-sodium meals · Cut back on the amount of salt you use in cooking. This will help you adjust to the taste. Do not add salt after cooking. One teaspoon of salt has about 2,300 mg of sodium. · Take the salt shaker off the table. · Flavor your food with garlic, lemon juice, onion, vinegar, herbs, and spices. Do not use soy sauce, lite soy sauce, steak sauce, onion salt, garlic salt, celery salt, mustard, or ketchup on your food. · Use low-sodium salad dressings, sauces, and ketchup. Or make your own salad dressings and sauces without adding salt. · Use less salt (or none) when recipes call for it. You can often use half the salt a recipe calls for without losing flavor. Other foods such as rice, pasta, and grains do not need added salt. · Rinse canned vegetables, and cook them in fresh water. This removes some-but not all-of the salt. · Avoid water that is naturally high in sodium or that has been treated with water softeners, which add sodium. Call your local water company to find out the sodium content of your water supply. If you buy bottled water, read the label and choose a sodium-free brand. Avoid high-sodium foods · Avoid eating: ¨ Smoked, cured, salted, and canned meat, fish, and poultry. ¨ Ham, mejia, hot dogs, and luncheon meats. ¨ Regular, hard, and processed cheese and regular peanut butter. ¨ Crackers with salted tops, and other salted snack foods such as pretzels, chips, and salted popcorn. ¨ Frozen prepared meals, unless labeled low-sodium. ¨ Canned and dried soups, broths, and bouillon, unless labeled sodium-free or low-sodium. ¨ Canned vegetables, unless labeled sodium-free or low-sodium. ¨ Tamanna Marrow fries, pizza, tacos, and other fast foods. ¨ Pickles, olives, ketchup, and other condiments, especially soy sauce, unless labeled sodium-free or low-sodium. Where can you learn more? Go to http://cassia-donte.info/. Enter S405 in the search box to learn more about \"Low Sodium Diet (2,000 Milligram): Care Instructions. \" Current as of: May 12, 2017 Content Version: 11.4 © 6654-0095 Cloudwords. Care instructions adapted under license by Sharematic (which disclaims liability or warranty for this information). If you have questions about a medical condition or this instruction, always ask your healthcare professional. [place] [address] any warranty or liability for your use of this information. Weigh Daily  Keep Log/Record Notify Dr. TETO BAEZ for a weight gain of 3 pounds or greater in one day or 5 pounds in one week. Low Sodium Diet as per CHF Education Smoking Cessation Program:  
This is a free, 
 phone/text/email based, smoking cessation program. The program is individualized to meet each patient's needs. To enroll use this link [email or web address] [email or web address]. [email or web address] TerraPerks Announcement We are excited to announce that we are making your provider's discharge notes available to you in TerraPerks. You will see these notes when they are completed and signed by the physician that discharged you from your recent hospital stay. If you have any questions or concerns about any information you see in TerraPerks, please call the Health Information Department where you were seen or reach out to your Primary Care Provider for more information about your plan of care. Introducing [facility] SERVICES! Grant Nolasco introduces TerraPerks patient portal. Now you can access parts of your medical record, email your doctor's office, and request medication refills online. 1. In your internet browser, go to [email or web address] 2. Click on the First Time User? Click Here link in the Sign In box. You will see the New Member Sign Up page. 3. Enter your TerraPerks Access Code exactly as it appears below. You will not need to use this code after youve completed the sign-up process. If you do not sign up before the expiration date, you must request a new code. · TerraPerks Access Code: [email or web address] Expires: 4/15/2018  4:29 PM 
 
4. Enter the last four digits of your Social Security Number (xxxx) and Date of Birth (mm/dd/yyyy) as indicated and click Submit. You will be taken to the next sign-up page. 5. Create a Brightbluet ID. This will be your TerraPerks login ID and cannot be changed, so think of one that is secure and easy to remember. 6. Create a TerraPerks password. You can change your password at any time. 7. Enter your Password Reset Question and Answer. This can be used at a later time if you forget your password. 8. Enter your e-mail address. You will receive e-mail notification when new information is available in [address] 9. Click Sign Up. You can now view and download portions of your medical record. 10. Click the Download Summary menu link to download a portable copy of your medical information. If you have questions, please visit the Frequently Asked Questions section of the [employer] website. Remember, [email or web address] is NOT to be used for urgent needs. For medical emergencies, dial 911. Now available from your iPhone and Android! Providers Seen During Your Hospitalization Provider Specialty Primary office phone Monster Osorio MD Emergency Medicine [phone] Donald Nicholson MD Hospitalist [phone] Leona Greenwood MD Internal Medicine [phone] Your Primary Care Physician (PCP) Primary Care Physician Office Phone Office Fax Anushka Hernandez [phone] You are allergic to the following No active allergies Recent Documentation Height Weight Breastfeeding? BMI OB Status Smoking Status 1.676 m (!) 177.9 kg No 63.29 kg/m2 Injection Current Every Day Smoker Emergency Contacts Name Discharge Info Relation Home Work Mobile JayjayEdwar N/A  AT THIS TIME [6] Child [2] [phone] Charmayne Boll [5]   [phone] Patient Belongings The following personal items are in your possession at time of discharge: 
  Dental Appliances: None  Visual Aid: Glasses, With patient      Home Medications: None   Jewelry: None  Clothing: At bedside, Jacket/Coat, Pants, Shirt    Other Valuables: At bedside, Cell Phone, Eyeglasses Discharge Instructions Attachments/References HEART FAILURE ZONES: GENERAL INFO (ENGLISH) HEART FAILURE: AVOIDING TRIGGERS (ENGLISH) Patient Handouts Learning About Heart Failure Zones What are heart failure zones? Heart failure zones give you an easy way to see changes in your heart failure symptoms. They also tell you when you need to get help. Check every day to see which zone you are in. Green zone. You are doing well. This is where you want to be. · Your weight is stable. This means it is not going up or down. · You breathe easily. · You are sleeping well. You are able to lie flat without shortness of breath. · You can do your usual activities. Yellow zone. Be careful. Your symptoms are changing. Call your doctor. · You have new or increased shortness of breath. · You are dizzy or lightheaded, or you feel like you may faint. · You have sudden weight gain, such as more than 2 to 3 pounds in a day or 5 pounds in a week. (Your doctor may suggest a different range of weight gain.) · You have increased swelling in your legs, ankles, or feet. · You are so tired or weak that you cannot do your usual activities. · You are not sleeping well. Shortness of breath wakes you up at night. You need extra pillows. Your doctor's name: ____________________________________________________________ Your doctor's contact information: _________________________________________________ Red zone. This is an emergency. Call 911. You have symptoms of sudden heart failure, such as: 
· You have severe trouble breathing. · You cough up pink, foamy mucus. · You have a new irregular or fast heartbeat. You have symptoms of a heart attack. These may include: · Chest pain or pressure, or a strange feeling in the chest. 
· Sweating. · Shortness of breath. · Nausea or vomiting. · Pain, pressure, or a strange feeling in the back, neck, jaw, or upper belly or in one or both shoulders or arms. · Lightheadedness or sudden weakness. · A fast or irregular heartbeat. If you have symptoms of a heart attack: After you call 911, the  may tell you to chew 1 adult-strength or 2 to 4 low-dose aspirin.  Wait for an ambulance. Do not try to drive yourself. Follow-up care is a key part of your treatment and safety. Be sure to make and go to all appointments, and call your doctor if you are having problems. It's also a good idea to know your test results and keep a list of the medicines you take. Where can you learn more? Go to http://cassia-donte.info/. Enter T174 in the search box to learn more about \"Learning About Heart Failure Zones. \" Current as of: September 21, 2016 Content Version: 11.4 © 8119-6730 Chorus. Care instructions adapted under license by [employer] (which disclaims liability or warranty for this information). If you have questions about a medical condition or this instruction, always ask your healthcare professional. [address] any warranty or liability for your use of this information. Avoiding Triggers With Heart Failure: Care Instructions Your Care Instructions Triggers are anything that make your heart failure flare up. A flare-up is also called \"sudden heart failure\" or \"acute heart failure. \" When you have a flare-up, fluid builds up in your lungs, and you have problems breathing. You might need to go to the hospital. By watching for changes in your condition and avoiding triggers, you can prevent heart failure flare-ups. Follow-up care is a key part of your treatment and safety. Be sure to make and go to all appointments, and call your doctor if you are having problems. It's also a good idea to know your test results and keep a list of the medicines you take. How can you care for yourself at home? Watch for changes in your weight and condition · Weigh yourself without clothing at the same time each day. Record your weight. Call your doctor if you have sudden weight gain, such as more than 2 to 3 pounds in a day or 5 pounds in a week.  (Your doctor may suggest a different range of weight gain.) A sudden weight gain may mean that your heart failure is getting worse. · Keep a daily record of your symptoms. Write down any changes in how you feel, such as new shortness of breath, cough, or problems eating. Also record if your ankles are more swollen than usual and if you feel more tired than usual. Note anything that you ate or did that could have triggered these changes. Limit sodium Sodium causes your body to hold on to extra water. This may cause your heart failure symptoms to get worse. People get most of their sodium from processed foods. Fast food and restaurant meals also tend to be very high in sodium. · Your doctor may suggest that you limit sodium to 2,000 milligrams (mg) a day or less. That is less than 1 teaspoon of salt a day, including all the salt you eat in cooking or in packaged foods. · Read food labels on cans and food packages. They tell you how much sodium you get in one serving. Check the serving size. If you eat more than one serving, you are getting more sodium. · Be aware that sodium can come in forms other than salt, including monosodium glutamate (MSG), sodium citrate, and sodium bicarbonate (baking soda). MSG is often added to Asian food. You can sometimes ask for food without MSG or salt. · Slowly reducing salt will help you adjust to the taste. Take the salt shaker off the table. · Flavor your food with garlic, lemon juice, onion, vinegar, herbs, and spices instead of salt. Do not use soy sauce, steak sauce, onion salt, garlic salt, mustard, or ketchup on your food, unless it is labeled \"low-sodium\" or \"low-salt. \" 
· Make your own salad dressings, sauces, and ketchup without adding salt. · Use fresh or frozen ingredients, instead of canned ones, whenever you can. Choose low-sodium canned goods. · Eat less processed food and food from restaurants, including fast food. Exercise as directed Moderate, regular exercise is very good for your heart. It improves your blood flow and helps control your weight. But too much exercise can stress your heart and cause a heart failure flare-up. · Check with your doctor before you start an exercise program. 
· Walking is an easy way to get exercise. Start out slowly. Gradually increase the length and pace of your walk. Swimming, riding a bike, and using a treadmill are also good forms of exercise. · When you exercise, watch for signs that your heart is working too hard. You are pushing yourself too hard if you cannot talk while you are exercising. If you become short of breath or dizzy or have chest pain, stop, sit down, and rest. 
· Do not exercise when you do not feel well. Take medicines correctly · Take your medicines exactly as prescribed. Call your doctor if you think you are having a problem with your medicine. · Make a list of all the medicines you take. Include those prescribed to you by other doctors and any over-the-counter medicines, vitamins, or supplements you take. Take this list with you when you go to any doctor. · Take your medicines at the same time every day. It may help you to post a list of all the medicines you take every day and what time of day you take them. · Make taking your medicine as simple as you can. Plan times to take your medicines when you are doing other things, such as eating a meal or getting ready for bed. This will make it easier to remember to take your medicines. · Get organized. Use helpful tools, such as daily or weekly pill containers. When should you call for help? Call 911 if you have symptoms of sudden heart failure such as: 
? · You have severe trouble breathing. ? · You cough up pink, foamy mucus. ? · You have a new irregular or rapid heartbeat. ?Call your doctor now or seek immediate medical care if: 
? · You have new or increased shortness of breath. ? · You are dizzy or lightheaded, or you feel like you may faint. ? · You have sudden weight gain, such as more than 2 to 3 pounds in a day or 5 pounds in a week. (Your doctor may suggest a different range of weight gain.) ? · You have increased swelling in your legs, ankles, or feet. ? · You are suddenly so tired or weak that you cannot do your usual activities. ? Watch closely for changes in your health, and be sure to contact your doctor if you develop new symptoms. Where can you learn more? Go to http://cassia-donte.info/. Enter K970 in the search box to learn more about \"Avoiding Triggers With Heart Failure: Care Instructions. \" Current as of: September 21, 2016 Content Version: 11.4 © 6736-8614 [employer]. Care instructions adapted under license by [employer] (which disclaims liability or warranty for this information). If you have questions about a medical condition or this instruction, always ask your healthcare professional. [address] any warranty or liability for your use of this information. Please provide this summary of care documentation to your next provider. Signatures-by signing, you are acknowledging that this After Visit Summary has been reviewed with you and you have received a copy. Patient Signature:  ____________________________________________________________ Date:  ____________________________________________________________  
  
Caty Maurice Provider Signature:  ____________________________________________________________ Date:  ____________________________________________________________

## 2018-01-21 LAB
ALBUMIN SERPL-MCNC: 3.1 G/DL (ref 3.5–5)
ALBUMIN/GLOB SERPL: 0.6 {RATIO} (ref 1.1–2.2)
ALP SERPL-CCNC: 100 U/L (ref 45–117)
ALT SERPL-CCNC: 14 U/L (ref 12–78)
ANION GAP SERPL CALC-SCNC: 4 MMOL/L (ref 5–15)
APPEARANCE UR: CLEAR
AST SERPL-CCNC: 15 U/L (ref 15–37)
ATRIAL RATE: 97 BPM
BACTERIA URNS QL MICRO: NEGATIVE /HPF
BILIRUB SERPL-MCNC: 0.3 MG/DL (ref 0.2–1)
BILIRUB UR QL: NEGATIVE
BNP SERPL-MCNC: 142 PG/ML (ref 0–125)
BUN SERPL-MCNC: 18 MG/DL (ref 6–20)
BUN/CREAT SERPL: 19 (ref 12–20)
CALCIUM SERPL-MCNC: 10.9 MG/DL (ref 8.5–10.1)
CALCULATED P AXIS, ECG09: 55 DEGREES
CALCULATED R AXIS, ECG10: 84 DEGREES
CALCULATED T AXIS, ECG11: 47 DEGREES
CHLORIDE SERPL-SCNC: 103 MMOL/L (ref 97–108)
CK SERPL-CCNC: 34 U/L (ref 26–192)
CO2 SERPL-SCNC: 33 MMOL/L (ref 21–32)
COLOR UR: NORMAL
CREAT SERPL-MCNC: 0.94 MG/DL (ref 0.55–1.02)
DIAGNOSIS, 93000: NORMAL
EPITH CASTS URNS QL MICRO: NORMAL /LPF
FLUAV AG NPH QL IA: NEGATIVE
FLUBV AG NOSE QL IA: NEGATIVE
GLOBULIN SER CALC-MCNC: 5.2 G/DL (ref 2–4)
GLUCOSE BLD STRIP.AUTO-MCNC: 115 MG/DL (ref 65–100)
GLUCOSE BLD STRIP.AUTO-MCNC: 146 MG/DL (ref 65–100)
GLUCOSE BLD STRIP.AUTO-MCNC: 76 MG/DL (ref 65–100)
GLUCOSE BLD STRIP.AUTO-MCNC: 78 MG/DL (ref 65–100)
GLUCOSE BLD STRIP.AUTO-MCNC: 93 MG/DL (ref 65–100)
GLUCOSE BLD STRIP.AUTO-MCNC: 96 MG/DL (ref 65–100)
GLUCOSE SERPL-MCNC: 107 MG/DL (ref 65–100)
GLUCOSE UR STRIP.AUTO-MCNC: NEGATIVE MG/DL
HGB UR QL STRIP: NEGATIVE
HYALINE CASTS URNS QL MICRO: NORMAL /LPF (ref 0–5)
KETONES UR QL STRIP.AUTO: NEGATIVE MG/DL
LEUKOCYTE ESTERASE UR QL STRIP.AUTO: NEGATIVE
NITRITE UR QL STRIP.AUTO: NEGATIVE
P-R INTERVAL, ECG05: 178 MS
PH UR STRIP: 6 [PH] (ref 5–8)
POTASSIUM SERPL-SCNC: 4.6 MMOL/L (ref 3.5–5.1)
PROT SERPL-MCNC: 8.3 G/DL (ref 6.4–8.2)
PROT UR STRIP-MCNC: NEGATIVE MG/DL
Q-T INTERVAL, ECG07: 344 MS
QRS DURATION, ECG06: 82 MS
QTC CALCULATION (BEZET), ECG08: 436 MS
RBC #/AREA URNS HPF: NORMAL /HPF (ref 0–5)
SERVICE CMNT-IMP: ABNORMAL
SERVICE CMNT-IMP: ABNORMAL
SERVICE CMNT-IMP: NORMAL
SODIUM SERPL-SCNC: 140 MMOL/L (ref 136–145)
SP GR UR REFRACTOMETRY: 1.01 (ref 1–1.03)
TROPONIN I SERPL-MCNC: <0.04 NG/ML
UA: UC IF INDICATED,UAUC: NORMAL
UROBILINOGEN UR QL STRIP.AUTO: 1 EU/DL (ref 0.2–1)
VENTRICULAR RATE, ECG03: 97 BPM
WBC URNS QL MICRO: NORMAL /HPF (ref 0–4)

## 2018-01-21 PROCEDURE — 99218 HC RM OBSERVATION: CPT

## 2018-01-21 PROCEDURE — 77030029684 HC NEB SM VOL KT MONA -A

## 2018-01-21 PROCEDURE — 94664 DEMO&/EVAL PT USE INHALER: CPT

## 2018-01-21 PROCEDURE — 36415 COLL VENOUS BLD VENIPUNCTURE: CPT | Performed by: INTERNAL MEDICINE

## 2018-01-21 PROCEDURE — 74011250637 HC RX REV CODE- 250/637: Performed by: INTERNAL MEDICINE

## 2018-01-21 PROCEDURE — 74011000250 HC RX REV CODE- 250: Performed by: INTERNAL MEDICINE

## 2018-01-21 PROCEDURE — 65270000029 HC RM PRIVATE

## 2018-01-21 PROCEDURE — 77010033678 HC OXYGEN DAILY

## 2018-01-21 PROCEDURE — 74011000250 HC RX REV CODE- 250: Performed by: EMERGENCY MEDICINE

## 2018-01-21 PROCEDURE — 82962 GLUCOSE BLOOD TEST: CPT

## 2018-01-21 PROCEDURE — 84484 ASSAY OF TROPONIN QUANT: CPT | Performed by: INTERNAL MEDICINE

## 2018-01-21 PROCEDURE — 94640 AIRWAY INHALATION TREATMENT: CPT

## 2018-01-21 PROCEDURE — 81001 URINALYSIS AUTO W/SCOPE: CPT | Performed by: INTERNAL MEDICINE

## 2018-01-21 PROCEDURE — 74011250636 HC RX REV CODE- 250/636: Performed by: INTERNAL MEDICINE

## 2018-01-21 RX ORDER — PANTOPRAZOLE SODIUM 40 MG/1
40 TABLET, DELAYED RELEASE ORAL
Status: DISCONTINUED | OUTPATIENT
Start: 2018-01-21 | End: 2018-01-24 | Stop reason: HOSPADM

## 2018-01-21 RX ORDER — DEXTROSE 50 % IN WATER (D50W) INTRAVENOUS SYRINGE
12.5-25 AS NEEDED
Status: DISCONTINUED | OUTPATIENT
Start: 2018-01-21 | End: 2018-01-24 | Stop reason: HOSPADM

## 2018-01-21 RX ORDER — KETOROLAC TROMETHAMINE 30 MG/ML
30 INJECTION, SOLUTION INTRAMUSCULAR; INTRAVENOUS
Status: DISPENSED | OUTPATIENT
Start: 2018-01-21 | End: 2018-01-24

## 2018-01-21 RX ORDER — FUROSEMIDE 10 MG/ML
40 INJECTION INTRAMUSCULAR; INTRAVENOUS
Status: DISCONTINUED | OUTPATIENT
Start: 2018-01-21 | End: 2018-01-24 | Stop reason: HOSPADM

## 2018-01-21 RX ORDER — ASPIRIN 81 MG/1
81 TABLET ORAL DAILY
Status: DISCONTINUED | OUTPATIENT
Start: 2018-01-21 | End: 2018-01-24 | Stop reason: HOSPADM

## 2018-01-21 RX ORDER — MAGNESIUM SULFATE 100 %
4 CRYSTALS MISCELLANEOUS AS NEEDED
Status: DISCONTINUED | OUTPATIENT
Start: 2018-01-21 | End: 2018-01-24 | Stop reason: HOSPADM

## 2018-01-21 RX ORDER — PRAVASTATIN SODIUM 40 MG/1
40 TABLET ORAL
Status: DISCONTINUED | OUTPATIENT
Start: 2018-01-21 | End: 2018-01-24 | Stop reason: HOSPADM

## 2018-01-21 RX ORDER — SODIUM CHLORIDE 0.9 % (FLUSH) 0.9 %
5-10 SYRINGE (ML) INJECTION AS NEEDED
Status: DISCONTINUED | OUTPATIENT
Start: 2018-01-21 | End: 2018-01-24 | Stop reason: HOSPADM

## 2018-01-21 RX ORDER — ONDANSETRON 2 MG/ML
4 INJECTION INTRAMUSCULAR; INTRAVENOUS
Status: DISCONTINUED | OUTPATIENT
Start: 2018-01-21 | End: 2018-01-24 | Stop reason: HOSPADM

## 2018-01-21 RX ORDER — METOPROLOL TARTRATE 25 MG/1
25 TABLET, FILM COATED ORAL 2 TIMES DAILY
Status: DISCONTINUED | OUTPATIENT
Start: 2018-01-21 | End: 2018-01-21

## 2018-01-21 RX ORDER — ACETAMINOPHEN 325 MG/1
650 TABLET ORAL
Status: DISCONTINUED | OUTPATIENT
Start: 2018-01-21 | End: 2018-01-24 | Stop reason: HOSPADM

## 2018-01-21 RX ORDER — SODIUM CHLORIDE 9 MG/ML
100 INJECTION, SOLUTION INTRAVENOUS CONTINUOUS
Status: DISCONTINUED | OUTPATIENT
Start: 2018-01-21 | End: 2018-01-21

## 2018-01-21 RX ORDER — METOPROLOL TARTRATE 25 MG/1
12.5 TABLET, FILM COATED ORAL 2 TIMES DAILY
Status: DISCONTINUED | OUTPATIENT
Start: 2018-01-21 | End: 2018-01-24 | Stop reason: HOSPADM

## 2018-01-21 RX ORDER — LISINOPRIL 5 MG/1
10 TABLET ORAL DAILY
Status: DISCONTINUED | OUTPATIENT
Start: 2018-01-21 | End: 2018-01-24 | Stop reason: HOSPADM

## 2018-01-21 RX ORDER — LISINOPRIL 20 MG/1
40 TABLET ORAL DAILY
Status: DISCONTINUED | OUTPATIENT
Start: 2018-01-21 | End: 2018-01-21

## 2018-01-21 RX ORDER — ALBUTEROL SULFATE 0.83 MG/ML
2.5 SOLUTION RESPIRATORY (INHALATION)
Status: DISCONTINUED | OUTPATIENT
Start: 2018-01-21 | End: 2018-01-24 | Stop reason: HOSPADM

## 2018-01-21 RX ORDER — SODIUM CHLORIDE 0.9 % (FLUSH) 0.9 %
5-10 SYRINGE (ML) INJECTION EVERY 8 HOURS
Status: DISCONTINUED | OUTPATIENT
Start: 2018-01-21 | End: 2018-01-24 | Stop reason: HOSPADM

## 2018-01-21 RX ORDER — GLYBURIDE 5 MG/1
5 TABLET ORAL
Status: DISCONTINUED | OUTPATIENT
Start: 2018-01-21 | End: 2018-01-24 | Stop reason: HOSPADM

## 2018-01-21 RX ORDER — FLUTICASONE PROPIONATE 50 MCG
2 SPRAY, SUSPENSION (ML) NASAL DAILY
Status: DISCONTINUED | OUTPATIENT
Start: 2018-01-21 | End: 2018-01-24 | Stop reason: HOSPADM

## 2018-01-21 RX ORDER — ENOXAPARIN SODIUM 100 MG/ML
40 INJECTION SUBCUTANEOUS EVERY 12 HOURS
Status: DISCONTINUED | OUTPATIENT
Start: 2018-01-21 | End: 2018-01-24 | Stop reason: HOSPADM

## 2018-01-21 RX ORDER — CYCLOBENZAPRINE HCL 10 MG
5 TABLET ORAL
Status: DISCONTINUED | OUTPATIENT
Start: 2018-01-21 | End: 2018-01-24 | Stop reason: HOSPADM

## 2018-01-21 RX ORDER — BENZONATATE 100 MG/1
100 CAPSULE ORAL
Status: DISCONTINUED | OUTPATIENT
Start: 2018-01-21 | End: 2018-01-24 | Stop reason: HOSPADM

## 2018-01-21 RX ORDER — CETIRIZINE HCL 10 MG
10 TABLET ORAL
Status: DISCONTINUED | OUTPATIENT
Start: 2018-01-21 | End: 2018-01-24 | Stop reason: HOSPADM

## 2018-01-21 RX ORDER — BISACODYL 5 MG
5 TABLET, DELAYED RELEASE (ENTERIC COATED) ORAL DAILY PRN
Status: DISCONTINUED | OUTPATIENT
Start: 2018-01-21 | End: 2018-01-24 | Stop reason: HOSPADM

## 2018-01-21 RX ORDER — INSULIN LISPRO 100 [IU]/ML
INJECTION, SOLUTION INTRAVENOUS; SUBCUTANEOUS
Status: DISCONTINUED | OUTPATIENT
Start: 2018-01-21 | End: 2018-01-24 | Stop reason: HOSPADM

## 2018-01-21 RX ADMIN — Medication 10 ML: at 21:20

## 2018-01-21 RX ADMIN — ENOXAPARIN SODIUM 40 MG: 40 INJECTION SUBCUTANEOUS at 08:14

## 2018-01-21 RX ADMIN — LISINOPRIL 10 MG: 5 TABLET ORAL at 08:15

## 2018-01-21 RX ADMIN — FLUTICASONE PROPIONATE 2 SPRAY: 50 SPRAY, METERED NASAL at 09:12

## 2018-01-21 RX ADMIN — SODIUM CHLORIDE 100 ML/HR: 900 INJECTION, SOLUTION INTRAVENOUS at 03:54

## 2018-01-21 RX ADMIN — GLYBURIDE 5 MG: 5 TABLET ORAL at 09:12

## 2018-01-21 RX ADMIN — ASPIRIN 81 MG: 81 TABLET, COATED ORAL at 08:15

## 2018-01-21 RX ADMIN — ALBUTEROL SULFATE 2.5 MG: 2.5 SOLUTION RESPIRATORY (INHALATION) at 21:21

## 2018-01-21 RX ADMIN — CYCLOBENZAPRINE HYDROCHLORIDE 5 MG: 10 TABLET, FILM COATED ORAL at 18:23

## 2018-01-21 RX ADMIN — METOPROLOL TARTRATE 12.5 MG: 25 TABLET ORAL at 08:15

## 2018-01-21 RX ADMIN — ENOXAPARIN SODIUM 40 MG: 40 INJECTION SUBCUTANEOUS at 21:20

## 2018-01-21 RX ADMIN — PRAVASTATIN SODIUM 40 MG: 40 TABLET ORAL at 21:20

## 2018-01-21 RX ADMIN — FUROSEMIDE 40 MG: 10 INJECTION, SOLUTION INTRAMUSCULAR; INTRAVENOUS at 15:40

## 2018-01-21 RX ADMIN — IPRATROPIUM BROMIDE AND ALBUTEROL SULFATE 3 ML: .5; 3 SOLUTION RESPIRATORY (INHALATION) at 00:42

## 2018-01-21 RX ADMIN — KETOROLAC TROMETHAMINE 30 MG: 30 INJECTION, SOLUTION INTRAMUSCULAR at 15:40

## 2018-01-21 RX ADMIN — FUROSEMIDE 40 MG: 10 INJECTION, SOLUTION INTRAMUSCULAR; INTRAVENOUS at 08:14

## 2018-01-21 RX ADMIN — PANTOPRAZOLE SODIUM 40 MG: 40 TABLET, DELAYED RELEASE ORAL at 08:15

## 2018-01-21 RX ADMIN — METOPROLOL TARTRATE 12.5 MG: 25 TABLET ORAL at 17:07

## 2018-01-21 NOTE — ED NOTES
Patient arrives via EMS with chief complaint of chest pain. Patient states pain began around 1700, stopped for a few hours, then started again around 2100. Patient state she was eating when pain began. Patient states pain began in her left arm and \"felt like pain was rising and felt hot. \" Patient states her head began hurting during this episode as well. Patient states she took her oxygen off during this episode, and when she put it back on, the pain returned. Patient is normally on 3.5L/min NC at home. Patient states she feels like she is not getting any oxygen through her nose with nasal cannula.

## 2018-01-21 NOTE — ED PROVIDER NOTES
EMERGENCY DEPARTMENT HISTORY AND PHYSICAL EXAM      Date: 1/20/2018  Patient Name: Blanca Lee    History of Presenting Illness     Chief Complaint   Patient presents with    Chest Pain       History Provided By: Patient    HPI: Blanca Lee, 41 y.o. female with PMHx significant for asthma, CHF, HTN, CAD, COPD, and DM, presents via EMS to the ED with cc of an acute on chronic exacerbation of SOB and intermittent left lower chest pain worsening over the last 2 days. The pt reports associated sx of exacerbated exertional SOB, a mucous productive cough, and BL lower extremity swelling left greater than right as well. She expresses that she is on 5L oxygen via nasal cannula at home and has been unable to resolve her SOB with breathing treatments leading her to call EMS. She discloses that she was seen in the ED last week for similar sx reporting that the etiology of her sx is unclear. Her CXR was negative for pleural effusion and she was discharged and diagnosed with COPD exacerbation. The pt ensures that she is on 40mg Lasix. She denies any fevers, chills, abdominal pain, nausea, vomiting, or diarrhea. PCP: Beatris Barnett MD    There are no other complaints, changes, or physical findings at this time. Current Outpatient Prescriptions   Medication Sig Dispense Refill    predniSONE (STERAPRED DS) 10 mg dose pack Take as directed 48 Tab 0    azithromycin (ZITHROMAX Z-BIANCA) 250 mg tablet Take as directed 6 Tab 0    benzonatate (TESSALON) 100 mg capsule Take 1 Cap by mouth three (3) times daily as needed for Cough. 15 Cap 0    fluticasone (FLONASE) 50 mcg/actuation nasal spray 2 Sprays by Both Nostrils route daily. 1 Bottle 0    l.acidoph-B.lactis-B.longum (FLORAJEN3) 460 mg (7.5-6- 1.5 bill. cell) cap cap Take 1 Cap by mouth Daily (before breakfast).  7 Cap 0    albuterol (PROVENTIL VENTOLIN) 2.5 mg /3 mL (0.083 %) nebulizer solution 3 mL by Nebulization route every four (4) hours as needed for Wheezing. 24 Each 0    furosemide (LASIX) 40 mg tablet Take 40 mg by mouth daily.  lovastatin (MEVACOR) 40 mg tablet Take 1 Tab by mouth nightly. 30 Tab 6    clotrimazole (LOTRIMIN) 1 % topical cream Apply to the affected areas twice daily until healed 15 g 0    lisinopril (PRINIVIL, ZESTRIL) 40 mg tablet Take 40 mg by mouth daily.  glyBURIDE (DIABETA) 5 mg tablet Take 5 mg by mouth Daily (before breakfast).  cetirizine (ZYRTEC) 10 mg tablet Take 10 mg by mouth daily as needed for Allergies.  omeprazole (PRILOSEC) 40 mg capsule Take 40 mg by mouth daily.  ketoconazole (NIZORAL) 2 % topical cream Apply  to affected area daily.  ammonium lactate (LAC-HYDRIN) 12 % topical cream rub in to affected area well 280 g 1    aspirin delayed-release 81 mg tablet Take 81 mg by mouth daily.  metoprolol (LOPRESSOR) 25 mg tablet Take 25 mg by mouth two (2) times a day.  nitroglycerin (NITROSTAT) 0.4 mg SL tablet 1 Tab by SubLINGual route every five (5) minutes as needed for Chest Pain (call 911 if not relieved by 3). 25 Tab 2    albuterol (PROVENTIL, VENTOLIN) 90 mcg/actuation inhaler Take 1-2 Puffs by inhalation every four (4) hours as needed for Wheezing.  17 g 0       Past History     Past Medical History:  Past Medical History:   Diagnosis Date    Arthritis     Asthma     CAD (coronary artery disease)     Congestive heart failure (HCC)     [facility]     Diabetes [facility]     Hypertension     Morbid obesity with BMI of 70 and over, adult ([facility]     NSTEMI (non-ST elevated myocardial infarction) ([facility]     SVT (supraventricular tachycardia) ([facility]        Past Surgical History:  Past Surgical History:   Procedure Laterality Date    CARDIAC SURG PROCEDURE UNLIST      Stents    HX  SECTION      HX ORTHOPAEDIC      HX OTHER SURGICAL      cyst removed from back       Family History:  Family History   Problem Relation Age of Onset    Heart Disease Mother     Heart Disease Father     Heart Disease Sister        Social History:  Social History   Substance Use Topics    Smoking status: Current Every Day Smoker     Packs/day: 0.25     Types: Cigarettes    Smokeless tobacco: Never Used    Alcohol use No       Allergies:  No Known Allergies      Review of Systems   Review of Systems   Constitutional: Negative. Negative for chills and fever. HENT: Negative. Negative for congestion, facial swelling, rhinorrhea, sore throat, trouble swallowing and voice change. Eyes: Negative. Respiratory: Positive for cough (mucous productive ) and shortness of breath. Negative for apnea, chest tightness and wheezing. Cardiovascular: Positive for chest pain (left lower chest intermittent ) and leg swelling (BL; left greater than right ). Negative for palpitations. Gastrointestinal: Negative. Negative for abdominal distention, abdominal pain, blood in stool, constipation, diarrhea, nausea and vomiting. Endocrine: Negative. Negative for cold intolerance, heat intolerance and polyuria. Genitourinary: Negative. Negative for difficulty urinating, dysuria, flank pain, frequency, hematuria and urgency. Musculoskeletal: Negative. Negative for arthralgias, back pain, myalgias, neck pain and neck stiffness. Skin: Negative. Negative for color change and rash. Neurological: Negative. Negative for dizziness, syncope, facial asymmetry, speech difficulty, weakness, light-headedness, numbness and headaches. Hematological: Negative. Does not bruise/bleed easily. Psychiatric/Behavioral: Negative. Negative for confusion and self-injury. The patient is not nervous/anxious. Physical Exam   Physical Exam   Constitutional: She is oriented to person, place, and time. She appears well-developed. No distress. Morbidly obese   HENT:   Head: Normocephalic and atraumatic. Mouth/Throat: Oropharynx is clear and moist. No oropharyngeal exudate. Eyes: Conjunctivae and EOM are normal. Pupils are equal, round, and reactive to light. Neck: Normal range of motion. Cardiovascular: Normal rate, regular rhythm and normal heart sounds. Exam reveals no gallop and no friction rub. No murmur heard. Pulmonary/Chest: Effort normal. No respiratory distress. She has wheezes (diffuse BL ). She has no rales. She exhibits tenderness (left anterior ). 3L oxygen via nasal cannula   Abdominal: Soft. Bowel sounds are normal. She exhibits no distension and no mass. There is no tenderness. There is no rebound and no guarding. Musculoskeletal: Normal range of motion. She exhibits edema (massive lower extremity edema left greater than right with chronic skin changes ). She exhibits no tenderness or deformity. Neurological: She is alert and oriented to person, place, and time. She displays normal reflexes. No cranial nerve deficit. She exhibits normal muscle tone. Coordination normal.   Skin: Skin is warm. No rash noted. She is not diaphoretic. Psychiatric: She has a normal mood and affect. Nursing note and vitals reviewed.         Diagnostic Study Results     Labs -     Recent Results (from the past 12 hour(s))   EKG, 12 LEAD, INITIAL    Collection Time: 01/20/18 10:48 PM   Result Value Ref Range    Ventricular Rate 97 BPM    Atrial Rate 97 BPM    P-R Interval 178 ms    QRS Duration 82 ms    Q-T Interval 344 ms    QTC Calculation (Bezet) 436 ms    Calculated P Axis 55 degrees    Calculated R Axis 84 degrees    Calculated T Axis 47 degrees    Diagnosis       Normal sinus rhythm  Low voltage QRS  When compared with ECG of 08-SEP-2017 20:39,  No significant change was found     CBC W/O DIFF    Collection Time: 01/20/18 11:06 PM   Result Value Ref Range    WBC 11.9 (H) 3.6 - 11.0 K/uL    RBC 4.65 3.80 - 5.20 M/uL    HGB 13.0 11.5 - 16.0 g/dL    HCT 43.7 35.0 - 47.0 %    MCV 94.0 80.0 - 99.0 FL    MCH 28.0 26.0 - 34.0 PG    MCHC 29.7 (L) 30.0 - 36.5 g/dL    RDW 14.3 11.5 - 14.5 %    PLATELET 021 815 - 926 K/uL   METABOLIC PANEL, COMPREHENSIVE    Collection Time: 01/20/18 11:06 PM   Result Value Ref Range    Sodium 140 136 - 145 mmol/L    Potassium 4.6 3.5 - 5.1 mmol/L    Chloride 103 97 - 108 mmol/L    CO2 33 (H) 21 - 32 mmol/L    Anion gap 4 (L) 5 - 15 mmol/L    Glucose 107 (H) 65 - 100 mg/dL    BUN 18 6 - 20 MG/DL    Creatinine 0.94 0.55 - 1.02 MG/DL    BUN/Creatinine ratio 19 12 - 20      GFR est AA >60 >60 ml/min/1.73m2    GFR est non-AA >60 >60 ml/min/1.73m2    Calcium 10.9 (H) 8.5 - 10.1 MG/DL    Bilirubin, total 0.3 0.2 - 1.0 MG/DL    ALT (SGPT) 14 12 - 78 U/L    AST (SGOT) 15 15 - 37 U/L    Alk. phosphatase 100 45 - 117 U/L    Protein, total 8.3 (H) 6.4 - 8.2 g/dL    Albumin 3.1 (L) 3.5 - 5.0 g/dL    Globulin 5.2 (H) 2.0 - 4.0 g/dL    A-G Ratio 0.6 (L) 1.1 - 2.2     TROPONIN I    Collection Time: 01/20/18 11:06 PM   Result Value Ref Range    Troponin-I, Qt. <0.04 <0.05 ng/mL   CK W/ REFLX CKMB    Collection Time: 01/20/18 11:06 PM   Result Value Ref Range    CK 34 26 - 192 U/L   NT-PRO BNP    Collection Time: 01/20/18 11:06 PM   Result Value Ref Range    NT pro- (H) 0 - 125 PG/ML   INFLUENZA A & B AG (RAPID TEST)    Collection Time: 01/20/18 11:43 PM   Result Value Ref Range    Influenza A Antigen NEGATIVE  NEG      Influenza B Antigen NEGATIVE  NEG         Radiologic Studies -   CT Results  (Last 48 hours)               01/21/18 0036  CTA CHEST W OR W WO CONT Final result    Impression:  IMPRESSION:    1. There is prominence of the main pulmonary artery may represent pulmonary   artery hypertension. 2. Study is compromised by patient's body habitus but no definite pulmonary   emboli identified. 3. There are small scattered airspace opacities in the upper lobes bilaterally   which was present on a CT dated 9/22/2017. .           Narrative:  INDICATION:   eval for PE chest pain       COMPARISON: 2017       TECHNIQUE:    Precontrast  images were obtained to localize the volume for acquisition. Multislice helical CT arteriography was performed from the diaphragm to the   thoracic inlet during uneventful rapid bolus intravenous administration of 100   mL of Isovue-370. Lung and soft tissue windows were generated. Post processing   was performed and coronal reformatted images were also generated. 3 D imaging   was performed. CT dose reduction was achieved through use of a standardized   protocol tailored for this examination and automatic exposure control for dose   modulation. FINDINGS:   Study is limited by patient's body habitus. Thyroid gland is unremarkable. Main pulmonary artery is enlarged measuring 4 cm. Thoracic aorta is within   normal limits. No mediastinal or hilar adenopathy identified. Study is limited by patient's body habitus. No definite pulmonary identified. There is suspicion of scattered small airspace opacities in the upper lobes   bilaterally right greater than left. CXR Results  (Last 48 hours)               01/20/18 2350  XR CHEST PA LAT Final result    Impression:  IMPRESSION: No acute abnormality identified allowing for technique. Narrative:  EXAM:  XR CHEST PA LAT       INDICATION:   sob       COMPARISON: 2018. FINDINGS: PA and lateral radiographs of the chest demonstrate study compromised   by patient's body habitus. No focal airspace process is identified. There is   prominence of the royal unchanged most likely vascular. Heart size is borderline   enlarged. .  Bony structures are intact. Medical Decision Making   I am the first provider for this patient. I reviewed the vital signs, available nursing notes, past medical history, past surgical history, family history and social history. Vital Signs-Reviewed the patient's vital signs.   Patient Vitals for the past 12 hrs:   Temp Pulse Resp BP SpO2   01/21/18 0145 - 97 - (!) 81/68 96 %   01/21/18 0130 - 95 - (!) 89/45 95 %   01/21/18 0115 - 98 - 110/83 93 %   01/21/18 0100 - 100 - 111/64 (!) 88 %   01/21/18 0049 - - - - 94 %   01/20/18 2244 98.1 °F (36.7 °C) 97 10 139/78 94 %       Pulse Oximetry Analysis - 94% on 3L oxygen via nasal cannula    Cardiac Monitor:   Rate: 97 bpm  Rhythm: Normal Sinus Rhythm      EKG interpretation: (Preliminary)  2248  Rhythm: normal sinus rhythm; and regular . Rate (approx.): 97; Axis: normal; SD interval: normal; QRS interval: normal ; ST/T wave: normal; Other findings: normal.  Written by SHRUTHI Marinibsue, as dictated by Librado Moreira MD.    Records Reviewed: Nursing Notes and Old Medical Records    Provider Notes (Medical Decision Making):     DDx: ACS, COPD exacerbation, CHF, Pleural effusion, PNA. 41year old female with multiple medical problems (COPD, CHF, CAD, CHF). The pt's last EF was 55% in 2016. She presents with chest pain, SOB, and lower extremity edema. Will check EKG, cardiac enzymes, basic labs, CXR and reassess. ED Course:   Initial assessment performed. The patients presenting problems have been discussed, and they are in agreement with the care plan formulated and outlined with them. I have encouraged them to ask questions as they arise throughout their visit. Progress Notes:     1:44 AM  The pt has been re-evaluated. Pt has remained hypotensive 89/45 MAP of 54. She is currently on 5L oxygen, remains symptomatic SOB, workup CTA concerning for pulmonary hypertension. Pt states she is followed by Dr. Kavita Felix, last echo Jan 2016. Will discuss with hospitalist for admission. CONSULT NOTE:   1:50 AM  Librado Moreira MD spoke with Dr. Xena Tobar,   Specialty: Hospitalist  Discussed pt's hx, disposition, and available diagnostic and imaging results. Reviewed care plans. Consultant will evaluate pt for admission.   Written by SHRUTHI Marin, as dictated by Librado Moreira MD.    CONSULT NOTE:  2:02 Escobar Pimentel MD  spoke with  Al,  Specialty: Cardiology  Discussed pt's hx, disposition, and available diagnostic and imaging results. Reviewed care plans. Consultant recommends that he will see and evaluate the pt in the morning. Written by Aerly Womack, SHRUTHI Scribe, as dictated by Dequan Rojo MD      Critical Care Time: 0 minutes    Disposition:  Admit Note:  1:51 AM  Patient is being admitted to the hospital by Dr. Franck Resendez. The results of their tests and reasons for their admission have been discussed with the patient and/or available family. They convey their agreement and understanding for the need to be admitted and for their admission diagnosis. Written by Arely Womack, SHRUTHI Scribe, as dictated by Dequan Rojo MD.     PLAN:  1. Admission    Diagnosis     Clinical Impression:   1. Transient hypotension    2. Dyspnea, unspecified type    3. Pulmonary hypertension due to COPD ([address]    4. Acute chest pain    5. Chronic respiratory failure with hypoxia (HCC)    6. Obesity hypoventilation syndrome ([address]        Attestations:    Attestation: This note is prepared by Jimi Womack, acting as Scribe for Dequan Rojo MD.      Dequan Rojo MD: The scribe's documentation has been prepared under my direction and personally reviewed by me in its entirety. I confirm that the note above accurately reflects all work, treatment, procedures, and medical decision making performed by me.

## 2018-01-21 NOTE — ED NOTES
Shift note, 1/21 3999-4025:    0700: Bedside report received from Donis Oleary, [address]: , meal tray ordered. AM meds requested from Pharmacy. 0730: Pt placed in bed in position of comfort with side rails up x 2, call bell within reach. Pt instructed to call for nursing assistance with any ambulation or other needs. 0800: Pt up to side of bed for breakfast.     0830: Pt up to bedside commode with 1 assist, 1 urine and 1 stool occurrence. Linen changed. 1016: Cardiology at bedside. 0915: Pt assisted to and from bedside commode. Call bell within reach. 1011: Menu requested from dietary for pts lunch. 1215: Pt sitting on side of bed for lunch. 1248: Verbal report called to [address] [place] [address] on [address]

## 2018-01-21 NOTE — PROGRESS NOTES
Hospitalist Progress Note    NAME: Mayuri Chino   :  1970   MRN:  [id number]       Assessment / Plan:  Chest pain in setting of chronic hypoxic respiratory failure, obesity hypoventilation and GEN/noncompliance with CPAP:  Baseline O2 5 LPM  cardiology consulted  troponins negative so far, no new EKG changes   Pending ECHO. Continue  diuresis with IV lasix 40 mg IV Q 12 hrs.  con't mevacor, lisinopril, metoprolol. Con't ASA. prn nitrobid  CPAP at night   Will follow up consult with CM for possible rehab placement on Discharge. Noninsulin dependent DM2 controlled without complication:  con't home glyburide  prn lispro sliding scale    Benign HTN:  con't lisinopril, metoprolol  prn nitrbid    Lymphedema with chronic LLE wound:    Wound care consulted , will follow up . COPD ,stable   Continue home breathing treatments   duoneb neb prn. Morbid obesity  Advised on diet . Body mass index is 64.87 kg/(m^2). Code status: Full  Prophylaxis: Lovenox  Recommended Disposition: [facility]/Rehab     Subjective:     Chief Complaint / Reason for Physician Visit  Had a chest pain and sob I am fine now. Discussed with RN events overnight. Review of Systems:  Symptom Y/N Comments  Symptom Y/N Comments   Fever/Chills n   Chest Pain n I had it earlier    Poor Appetite n   Edema     Cough y   Abdominal Pain     Sputum    Joint Pain n    SOB/RAZO y   Pruritis/Rash     Nausea/vomit n   Tolerating PT/OT     Diarrhea n   Tolerating Diet     Constipation n   Other       Could NOT obtain due to:      Objective:     VITALS:   Last 24hrs VS reviewed since prior progress note.  Most recent are:  Patient Vitals for the past 24 hrs:   Temp Pulse Resp BP SpO2   18 1009 - 90 - (!) 129/96 94 %   18 0814 - (!) 103 - - -   18 0730 - - - - 90 %   18 0700 - (!) 103 23 107/46 95 %   18 0600 - 94 20 114/75 93 %   18 0500 - (!) 108 21 - 91 %   18 0345 - 90 - 123/44 94 %   18 0330 - 90 - 117/77 97 %   01/21/18 0315 - 94 - 131/89 91 %   01/21/18 0300 - 94 - 122/76 90 %   01/21/18 0245 - 94 - 123/85 90 %   01/21/18 0145 - 97 - (!) 81/68 96 %   01/21/18 0130 - 95 - (!) 89/45 95 %   01/21/18 0115 - 98 - 110/83 93 %   01/21/18 0100 - 100 - 111/64 (!) 88 %   01/21/18 0049 - - - - 94 %   01/20/18 2244 98.1 °F (36.7 °C) 97 10 139/78 94 %       Intake/Output Summary (Last 24 hours) at 01/21/18 1253  Last data filed at 01/21/18 1248   Gross per 24 hour   Intake                0 ml   Output             2100 ml   Net            -2100 ml        PHYSICAL EXAM:  General: WD, WN. Alert, cooperative, no acute distress   morbidly obese. EENT:  EOMI. Anicteric sclerae. MMM  Resp:  Decrease air entry in the lower lung fields, no crackles BL. No wheezing  CV:  Regular  rhythm,  No edema  GI:  Soft, Non distended, Non tender.  +Bowel sounds  Neurologic:  Alert and oriented X 3, normal speech,   Psych:   Good insight. Not anxious nor agitated  Skin:  No rashes. No jaundice  Chronic LE swelling , chronic skin changes,fingaiting horny thick skin on both LE and open wound on the left lower leg ,rapped up. Reviewed most current lab test results and cultures  YES  Reviewed most current radiology test results   YES  Review and summation of old records today    NO  Reviewed patient's current orders and MAR    YES  PMH/SH reviewed - no change compared to H&P  ________________________________________________________________________  Care Plan discussed with:    Comments   Patient y    Family      RN y    Care Manager     Consultant                        Multidiciplinary team rounds were held today with , nursing, pharmacist and clinical coordinator. Patient's plan of care was discussed; medications were reviewed and discharge planning was addressed.      ________________________________________________________________________  Total NON critical care TIME:  45   Minutes    Total CRITICAL CARE TIME Spent: Minutes non procedure based      Comments   >50% of visit spent in counseling and coordination of care     ________________________________________________________________________  Ori Johnson MD     Procedures: see electronic medical records for all procedures/Xrays and details which were not copied into this note but were reviewed prior to creation of Plan. LABS:  I reviewed today's most current labs and imaging studies. Pertinent labs include:  Recent Labs      01/20/18   2306   WBC  11.9*   HGB  13.0   HCT  43.7   PLT  219     Recent Labs      01/20/18   2306   NA  140   K  4.6   CL  103   CO2  33*   GLU  107*   BUN  18   CREA  0.94   CA  10.9*   ALB  3.1*   TBILI  0.3   SGOT  15   ALT  14       Signed:  Ori Johnson MD

## 2018-01-21 NOTE — H&P
Hospitalist Admission Note    NAME: Isamar Siegel   :  1970   MRN:  [id number]     Date/Time:  2018 2:01 AM    Patient PCP: Helene Carrion MD  ________________________________________________________________________    My assessment of this patient's clinical condition and my plan of care is as follows. Assessment / Plan:  Chest pain in setting of chronic hypoxic respiratory failure, obesity hypoventilation and GEN/noncompliance with CPAP:  Baseline O2 5 LPM  - CTA chest with prominence of the main pulmonary artery may represent pulmonary  artery hypertension. There are small scattered airspace opacities in the upper lobes bilaterally which was present on a CT dated 2017.  - serial troponin. Check echo. - cardiology consulted  - aggressive diuresis with IV lasix  - con't mevacor, lisinopril, metoprolol. Con't ASA. - prn nitrobid  - do not feel this is URI or asthma/COPD exacerbation. Stopped prednisone and azithro. - CM consulted - feel she needs inpatient rehab such as [facility] if she is really trying to improve mobility and change the course of her life. I have told her my feeling that unless she makes a dramatic change to lose weight and improve her mobility, she will continue to decline and will die. Noninsulin dependent DM2 controlled without complication:  - con't home glyburide  - prn lispro sliding scale  Benign HTN:  - con't lisinopril, metoprolol  - prn nitrbid  Lymphedema with chronic LLE wound:  Wound care consulted  COPD without acute exacerbation: prn albuterol nebs  Supermorbid obesity    Code Status: Full  DVT Prophylaxis: lovenox        Subjective:   CHIEF COMPLAINT: chest pain    HISTORY OF PRESENT ILLNESS:     Venkatesh Carvajal is a 41 y.o.  female who presents with above. Pt says that she has been declining for months. She has had multiple ER visits for chest pain. Tonight she presents with chest pain again.   She says that she is sitting and watching TV and then has pain in her L arm/shoulder that moves up into her neck and L chest when she feels like she is smothering. She feels like she is strangling on her O2 and has to take it off. She says that she cannot tolerate oxygen and cannot tolerate CPAP either so hasn't been using this for at least a couple of weeks. She denies nausea/vomiting or stool changes. No fever or URI sx but feels hot sometimes. She is frustrated with her edema but says that her doctor told her to come to the ER for this because he can't monitor her blood pressure with diuresis. She has a chronic LLE wound. Pt does not want to consider rehab options because \"no one can watch my house or pay my bills for me. \"    We were asked to admit for work up and evaluation of the above problems. Past Medical History:   Diagnosis Date    Arthritis     Asthma     CAD (coronary artery disease)     Congestive heart failure (HCC)     COPD (chronic obstructive pulmonary disease) ([address]     Diabetes ([address]     Hypertension     Morbid obesity with BMI of 70 and over, adult ([address]     NSTEMI (non-ST elevated myocardial infarction) ([address]     SVT (supraventricular tachycardia) ([address]         Past Surgical History:   Procedure Laterality Date    CARDIAC SURG PROCEDURE UNLIST      Stents    HX  SECTION      HX ORTHOPAEDIC      HX OTHER SURGICAL      cyst removed from back       Social History   Substance Use Topics    Smoking status: Current Every Day Smoker     Packs/day: 0.25     Types: Cigarettes    Smokeless tobacco: Never Used    Alcohol use No        Family History   Problem Relation Age of Onset    Heart Disease Mother     Heart Disease Father     Heart Disease Sister      No Known Allergies     Prior to Admission medications    Medication Sig Start Date End Date Taking?  Authorizing Provider   predniSONE (STERAPRED DS) 10 mg dose pack Take as directed 17   Prateek Garcia MD   azithromycin (ZITHROMAX Z-BIANCA) 250 mg tablet Take as directed 11/26/17   Keith Marques MD   benzonatate (TESSALON) 100 mg capsule Take 1 Cap by mouth three (3) times daily as needed for Cough. 9/27/17   Astrid Almazan NP   fluticasone (FLONASE) 50 mcg/actuation nasal spray 2 Sprays by Both Nostrils route daily. 9/9/17   Nesha Gallardo MD   l.acidoph-B.lactis-B.longum [employer] [facility]) 460 mg (7.5-6- 1.5 bill. cell) cap cap Take 1 Cap by mouth Daily (before breakfast). 7/27/17   Patricio Foster MD   albuterol (PROVENTIL VENTOLIN) 2.5 mg /3 mL (0.083 %) nebulizer solution 3 mL by Nebulization route every four (4) hours as needed for Wheezing. 6/12/17 April MARI Garner MD   furosemide (LASIX) 40 mg tablet Take 40 mg by mouth daily. Landon Raza MD   lovastatin (MEVACOR) 40 mg tablet Take 1 Tab by mouth nightly. 1/14/16   Refugio Burnette NP   clotrimazole (LOTRIMIN) 1 % topical cream Apply to the affected areas twice daily until healed 1/13/16   Che Holguin MD   lisinopril (PRINIVIL, ZESTRIL) 40 mg tablet Take 40 mg by mouth daily. Historical Provider   glyBURIDE (DIABETA) 5 mg tablet Take 5 mg by mouth Daily (before breakfast). Historical Provider   cetirizine (ZYRTEC) 10 mg tablet Take 10 mg by mouth daily as needed for Allergies. Historical Provider   omeprazole (PRILOSEC) 40 mg capsule Take 40 mg by mouth daily. 7/18/15   Landon Raza MD   ketoconazole (NIZORAL) 2 % topical cream Apply  to affected area daily. Historical Provider   ammonium lactate (LAC-HYDRIN) 12 % topical cream rub in to affected area well 11/21/14   Doreen Horton MD   aspirin delayed-release 81 mg tablet Take 81 mg by mouth daily. Historical Provider   metoprolol (LOPRESSOR) 25 mg tablet Take 25 mg by mouth two (2) times a day. Historical Provider   nitroglycerin (NITROSTAT) 0.4 mg SL tablet 1 Tab by SubLINGual route every five (5) minutes as needed for Chest Pain (call 911 if not relieved by 3).  9/12/13   Refugio Burnette NP   albuterol (PROVENTIL, VENTOLIN) 90 mcg/actuation inhaler Take 1-2 Puffs by inhalation every four (4) hours as needed for Wheezing. 9/10/13   Dain Dockery MD       REVIEW OF SYSTEMS:     I am not able to complete the review of systems because: The patient is intubated and sedated    The patient has altered mental status due to his acute medical problems    The patient has baseline aphasia from prior stroke(s)    The patient has baseline dementia and is not reliable historian    The patient is in acute medical distress and unable to provide information           Total of 12 systems reviewed as follows:       POSITIVE= underlined text  Negative = text not underlined  General:  fever, chills, sweats, generalized weakness, weight loss/gain,      loss of appetite   Eyes:    blurred vision, eye pain, loss of vision, double vision  ENT:    rhinorrhea, pharyngitis   Respiratory:   cough, sputum production, SOB, RAZO, wheezing, pleuritic pain   Cardiology:   chest pain, palpitations, orthopnea, PND, edema, syncope   Gastrointestinal:  abdominal pain , N/V, diarrhea, dysphagia, constipation, bleeding   Genitourinary:  frequency, urgency, dysuria, hematuria, incontinence   Muskuloskeletal :  arthralgia, myalgia, back pain  Hematology:  easy bruising, nose or gum bleeding, lymphadenopathy   Dermatological: rash, ulceration, pruritis, color change / jaundice  Endocrine:   hot flashes or polydipsia   Neurological:  headache, dizziness, confusion, focal weakness, paresthesia,     Speech difficulties, memory loss, gait difficulty  Psychological: Feelings of anxiety, depression, agitation    Objective:   VITALS:    Visit Vitals    BP (!) 81/68    Pulse 97    Temp 98.1 °F (36.7 °C)    Resp 10    Ht 5' 6\" (1.676 m)    Wt (!) 182.3 kg (401 lb 14.4 oz)    SpO2 96%    BMI 64.87 kg/m2       PHYSICAL EXAM:    General:    Obese, alert, cooperative, no distress, appears stated age.      HEENT: Atraumatic, anicteric sclerae, pink conjunctivae     No oral ulcers, mucosa moist, throat clear, dentition fair  Neck:  Supple, symmetrical,  thyroid: non tender  Lungs:   Clear to auscultation bilaterally. No Wheezing or Rhonchi. No rales. Chest wall:  No tenderness  No Accessory muscle use. Heart:   Regular  rhythm,  No murmur  2+ edema  Abdomen:   Soft, non-tender. Not distended. Bowel sounds normal  Extremities: No cyanosis. No clubbing,      Skin turgor normal, Capillary refill normal, Radial dial pulse 2+  Skin:     Pale. Not Jaundiced  No rashes. Lymphedema with lichenification. L lower leg in dressing which Pt refused to have removed since we do not have wound care nursing here today. Psych:  Poor insight. Not depressed. Not anxious or agitated. Neurologic: EOMs intact. No facial asymmetry. No aphasia or slurred speech. Symmetrical strength, Sensation grossly intact. Alert and oriented X 4.     _______________________________________________________________________  Care Plan discussed with:    Comments   Patient x    Family      RN x    Care Manager                    Consultant:      _______________________________________________________________________  Expected  Disposition:   Home with Family    HH/PT/OT/RN    SNF/LTC x   LUIS A    ________________________________________________________________________  TOTAL TIME:  48 Minutes    Critical Care Provided     Minutes non procedure based      Comments    x Reviewed previous records   >50% of visit spent in counseling and coordination of care x Discussion with patient and/or family and questions answered       ________________________________________________________________________  Signed: Rica Oppenheim, MD    Procedures: see electronic medical records for all procedures/Xrays and details which were not copied into this note but were reviewed prior to creation of Plan.     LAB DATA REVIEWED:    Recent Results (from the past 24 hour(s))   EKG, 12 LEAD, INITIAL    Collection Time: 01/20/18 10:48 PM   Result Value Ref Range    Ventricular Rate 97 BPM    Atrial Rate 97 BPM    P-R Interval 178 ms    QRS Duration 82 ms    Q-T Interval 344 ms    QTC Calculation (Bezet) 436 ms    Calculated P Axis 55 degrees    Calculated R Axis 84 degrees    Calculated T Axis 47 degrees    Diagnosis       Normal sinus rhythm  Low voltage QRS  When compared with ECG of 08-SEP-2017 20:39,  No significant change was found     CBC W/O DIFF    Collection Time: 01/20/18 11:06 PM   Result Value Ref Range    WBC 11.9 (H) 3.6 - 11.0 K/uL    RBC 4.65 3.80 - 5.20 M/uL    HGB 13.0 11.5 - 16.0 g/dL    HCT 43.7 35.0 - 47.0 %    MCV 94.0 80.0 - 99.0 FL    MCH 28.0 26.0 - 34.0 PG    MCHC 29.7 (L) 30.0 - 36.5 g/dL    RDW 14.3 11.5 - 14.5 %    PLATELET 475 952 - 129 K/uL   METABOLIC PANEL, COMPREHENSIVE    Collection Time: 01/20/18 11:06 PM   Result Value Ref Range    Sodium 140 136 - 145 mmol/L    Potassium 4.6 3.5 - 5.1 mmol/L    Chloride 103 97 - 108 mmol/L    CO2 33 (H) 21 - 32 mmol/L    Anion gap 4 (L) 5 - 15 mmol/L    Glucose 107 (H) 65 - 100 mg/dL    BUN 18 6 - 20 MG/DL    Creatinine 0.94 0.55 - 1.02 MG/DL    BUN/Creatinine ratio 19 12 - 20      GFR est AA >60 >60 ml/min/1.73m2    GFR est non-AA >60 >60 ml/min/1.73m2    Calcium 10.9 (H) 8.5 - 10.1 MG/DL    Bilirubin, total 0.3 0.2 - 1.0 MG/DL    ALT (SGPT) 14 12 - 78 U/L    AST (SGOT) 15 15 - 37 U/L    Alk.  phosphatase 100 45 - 117 U/L    Protein, total 8.3 (H) 6.4 - 8.2 g/dL    Albumin 3.1 (L) 3.5 - 5.0 g/dL    Globulin 5.2 (H) 2.0 - 4.0 g/dL    A-G Ratio 0.6 (L) 1.1 - 2.2     TROPONIN I    Collection Time: 01/20/18 11:06 PM   Result Value Ref Range    Troponin-I, Qt. <0.04 <0.05 ng/mL   CK W/ REFLX CKMB    Collection Time: 01/20/18 11:06 PM   Result Value Ref Range    CK 34 26 - 192 U/L   NT-PRO BNP    Collection Time: 01/20/18 11:06 PM   Result Value Ref Range    NT pro- (H) 0 - 125 PG/ML   INFLUENZA A & B AG (RAPID TEST)    Collection Time: 01/20/18 11:43 PM Result Value Ref Range    Influenza A Antigen NEGATIVE  NEG      Influenza B Antigen NEGATIVE  NEG

## 2018-01-21 NOTE — ED NOTES
Pt moved to room 30 and aware of boarding status. Pt states, \"You mean to tell me there is no where else in this damn place for me to go? \" Discussed process with patient. Pt demanding. On cardiac monitor. In hospital bed. Call light in reach.

## 2018-01-21 NOTE — CONSULTS
Subjective:                 [id number] [address]  [phone]    Date of  Admission: 1/20/2018 10:34 PM     Admission type:Emergency    Mario Elise is a 41 y.o. female admitted for Chest pain. She presented with cp yesterday. She has a hx of cad sp pci with no stable follow up. Last seen in 2016. Not sure about her compliance with medications or diet - has refused in the past. Currently cp free.     Patient Active Problem List    Diagnosis Date Noted    Multifocal pneumonia 09/27/2017    Shortness of breath 09/24/2017    SOB (shortness of breath) 09/22/2017    Maggot infestation 07/25/2017    COPD exacerbation ([address] 10/28/2016    Cellulitis, leg 08/18/2016    CAP (community acquired pneumonia) 01/13/2016     Class: Present on Admission    Acute respiratory failure with hypoxia and hypercarbia ([address] 01/10/2016     Class: Present on Admission    SIRS due to infectious process with acute organ dysfunction ([address] 01/10/2016     Class: Acute    Cough with hemoptysis 01/10/2016     Class: Present on Admission    Lymphedema of both lower extremities 01/10/2016     Class: Chronic    Gangrenous toe ([address] 01/09/2016    Type II diabetes mellitus, uncontrolled ([address] 01/09/2016    HTN (hypertension) 69/45/2449    Diastolic CHF, chronic ([address] 01/09/2016    COPD (chronic obstructive pulmonary disease) ([address] 07/21/2015     Class: Chronic    Sepsis associated hypotension ([address] 07/21/2015    Cellulitis 05/25/2015    Tobacco abuse 01/13/2013    Obesity, morbid (more than 100 lbs over ideal weight or BMI > 40) ([address] 01/13/2013    Hypoxia 10/22/2012    Noncompliance with therapeutic plan 10/22/2012    Chest pain 10/22/2012    GERD (gastroesophageal reflux disease) 10/22/2012    S/P PTCA (percutaneous transluminal coronary angioplasty) 08/22/2012    Coronary atherosclerosis of native coronary artery 08/22/2012    SVT (supraventricular tachycardia) ([address] 08/18/2012    NSTEMI (non-ST elevated myocardial infarction) ([facility] 2012    Acute on chronic diastolic heart failure ([facility] 2012    Malignant essential hypertension 2012    Asthma 2012     Class: Chronic    Obesity hypoventilation syndrome ([facility] 2012    Dyspnea 2012    Chest pressure 2012    CHF (congestive heart failure) ([facility] 2012      Noris Byers MD  Past Medical History:   Diagnosis Date    Arthritis     Asthma     CAD (coronary artery disease)     Congestive heart failure (HCC)     COPD (chronic obstructive pulmonary disease) ([facility]     Diabetes ([facility]     Hypertension     Morbid obesity with BMI of 70 and over, adult ([facility]     NSTEMI (non-ST elevated myocardial infarction) ([facility]     SV[facility]       Past Surgical History:   Procedure Laterality Date    CARDIAC SURG PROCEDURE UNLIST      Stents    HX  SECTION      HX ORTHOPAEDIC      HX OTHER SURGICAL      cyst removed from back     No Known Allergies   Social History   Substance Use Topics    Smoking status: Current Every Day Smoker     Packs/day: 0.25     Types: Cigarettes    Smokeless tobacco: Never Used    Alcohol use No     Family History   Problem Relation Age of Onset    Heart Disease Mother     Heart Disease Father     Heart Disease Sister       Current Facility-Administered Medications   Medication Dose Route Frequency    albuterol (PROVENTIL VENTOLIN) nebulizer solution 2.5 mg  2.5 mg Nebulization Q4H PRN    aspirin delayed-release tablet 81 mg  81 mg Oral DAILY    benzonatate (TESSALON) capsule 100 mg  100 mg Oral TID PRN    cetirizine (ZYRTEC) tablet 10 mg  10 mg Oral DAILY PRN    fluticasone (FLONASE) 50 mcg/actuation nasal spray 2 Spray  2 Spray Both Nostrils DAILY    glyBURIDE (DIABETA) tablet 5 mg  5 mg Oral ACB    pravastatin (PRAVACHOL) tablet 40 mg  40 mg Oral QHS    pantoprazole (PROTONIX) tablet 40 mg  40 mg Oral ACB    sodium chloride (NS) flush 5-10 mL  5-10 mL IntraVENous Q8H    sodium chloride (NS) flush 5-10 mL  5-10 mL IntraVENous PRN    acetaminophen (TYLENOL) tablet 650 mg  650 mg Oral Q4H PRN    ondansetron (ZOFRAN) injection 4 mg  4 mg IntraVENous Q4H PRN    bisacodyl (DULCOLAX) tablet 5 mg  5 mg Oral DAILY PRN    enoxaparin (LOVENOX) injection 40 mg  40 mg SubCUTAneous Q12H    ketorolac (TORADOL) injection 30 mg  30 mg IntraVENous Q6H PRN    insulin lispro (HUMALOG) injection   SubCUTAneous AC&HS    glucose chewable tablet 16 g  4 Tab Oral PRN    dextrose (D50W) injection syrg 12.5-25 g  12.5-25 g IntraVENous PRN    glucagon (GLUCAGEN) injection 1 mg  1 mg IntraMUSCular PRN    nitroglycerin (NITROBID) 2 % ointment 1 Inch  1 Inch Topical Q6H PRN    metoprolol tartrate (LOPRESSOR) tablet 12.5 mg  12.5 mg Oral BID    furosemide (LASIX) injection 40 mg  40 mg IntraVENous ACB&D    lisinopril (PRINIVIL, ZESTRIL) tablet 10 mg  10 mg Oral DAILY     Current Outpatient Prescriptions   Medication Sig    predniSONE (STERAPRED DS) 10 mg dose pack Take as directed    azithromycin (ZITHROMAX Z-BIANCA) 250 mg tablet Take as directed    benzonatate (TESSALON) 100 mg capsule Take 1 Cap by mouth three (3) times daily as needed for Cough.  fluticasone (FLONASE) 50 mcg/actuation nasal spray 2 Sprays by Both Nostrils route daily.  l.acidoph-B.lactis-B.longum (FLORAJEN3) 460 mg (7.5-6- 1.5 bill. cell) cap cap Take 1 Cap by mouth Daily (before breakfast).  albuterol (PROVENTIL VENTOLIN) 2.5 mg /3 mL (0.083 %) nebulizer solution 3 mL by Nebulization route every four (4) hours as needed for Wheezing.  furosemide (LASIX) 40 mg tablet Take 40 mg by mouth daily.  lovastatin (MEVACOR) 40 mg tablet Take 1 Tab by mouth nightly.  clotrimazole (LOTRIMIN) 1 % topical cream Apply to the affected areas twice daily until healed    lisinopril (PRINIVIL, ZESTRIL) 40 mg tablet Take 40 mg by mouth daily.     glyBURIDE (DIABETA) 5 mg tablet Take 5 mg by mouth Daily (before breakfast).  cetirizine (ZYRTEC) 10 mg tablet Take 10 mg by mouth daily as needed for Allergies.  omeprazole (PRILOSEC) 40 mg capsule Take 40 mg by mouth daily.  ketoconazole (NIZORAL) 2 % topical cream Apply  to affected area daily.  ammonium lactate (LAC-HYDRIN) 12 % topical cream rub in to affected area well    aspirin delayed-release 81 mg tablet Take 81 mg by mouth daily.  metoprolol (LOPRESSOR) 25 mg tablet Take 25 mg by mouth two (2) times a day.  nitroglycerin (NITROSTAT) 0.4 mg SL tablet 1 Tab by SubLINGual route every five (5) minutes as needed for Chest Pain (call 911 if not relieved by 3).  albuterol (PROVENTIL, VENTOLIN) 90 mcg/actuation inhaler Take 1-2 Puffs by inhalation every four (4) hours as needed for Wheezing. Review of Symptoms:  Constitutional: obese  Eyes: negative  Ears, nose, mouth, throat, and face: negative  Respiratory: sob  Cardiovascular: intermittent cp  Gastrointestinal: negative  Genitourinary: negative  Musculoskeletal: negative  Neurological: negative  Behvioral/Psych: non-compliance  Endocrine: negative     Subjective:      Visit Vitals    /46    Pulse (!) 103    Temp 98.1 °F (36.7 °C)    Resp 23    Ht 5' 6\" (1.676 m)    Wt (!) 401 lb 14.4 oz (182.3 kg)    SpO2 90%    Breastfeeding No    BMI 64.87 kg/m2       Physical Exam  Abdomen: soft, non-tender.    Extremities: extremities normal  Heart: regular rate and rhythm  Lungs: clear to auscultation bilaterally  Pulses: 2+ and symmetric    Cardiographics    Telemetry: nsr    ECG: nsr, non specific st changes    Echocardiogram: pending    Labs:  Recent Labs      01/20/18 2306   WBC  11.9*   HGB  13.0   HCT  43.7   PLT  219     Recent Labs      01/20/18 2306   NA  140   K  4.6   CL  103   CO2  33*   GLU  107*   BUN  18   CREA  0.94   CA  10.9*   ALB  3.1*   TBILI  0.3   SGOT  15   ALT  14       Recent Labs      01/21/18   0532  01/20/18 2306 TROIQ  <0.04  <0.04       No intake or output data in the 24 hours ending 01/21/18 2932      Assessment:     Assessment:       Active Problems:    Malignant essential hypertension (8/4/2012)      Chest pain (10/22/2012)      Acute respiratory failure with hypoxia and hypercarbia ([address] (1/10/2016)         Plan:     Isael Brooks is a a pleasant lady with dm, htn, cad and non-compliance. She has r/o MI by enzymes. Will obtain an echo to assess her LVEF. Will cont med rx - asa/bb/ace-inh. Dr Jolley Chris to see in the am. Thank you for this interesting consultation.      Cam Fuller MD, [facility]    1/21/2018

## 2018-01-21 NOTE — PROGRESS NOTES
TRANSFER - IN REPORT:    Verbal report received from Florin De La Cruz RN(name) on [employer]  being received from ED(unit) for routine progression of care      Report consisted of patients Situation, Background, Assessment and   Recommendations(SBAR). Information from the following report(s) SBAR, Kardex, Intake/Output, MAR, Recent Results and Cardiac Rhythm NSR/ ST was reviewed with the receiving nurse. Opportunity for questions and clarification was provided. Assessment completed upon patients arrival to unit and care assumed.

## 2018-01-21 NOTE — ED NOTES
Verbal report called to [address] [place] [address] on [address] [facility] Diana

## 2018-01-21 NOTE — PROGRESS NOTES
1345: Patient arrived to room. No complaints of SOB or discomfort. Vitals stable. MD at bedside. Primary Nurse Raji Stevens and Teri Henriquez, RN performed a dual skin assessment on this patient Impairment noted- see wound doc flow sheet (BLE june, leathery skin with wound to lower left extremity, Skin is red/ moist around Abdominal folds/ pannus/ groin and under bilateral breast)  Jeffrey score is 17    1500: Patient has nobody to bring home CPAP machine in. Patient to use hospital CPAP machine per MD. Patient also requesting flexeril for muscle spasms. MD notified. Orders received for 5mg PO Flexiril BID PRN. 1900: Bedside report given to nurse. SHIFT SUMMARY:            13[address] Jim[address] Juan NURSING NOTE   Admission Date 1/20/2018   Admission Diagnosis Chest pain   Consults IP CONSULT TO CARDIOLOGY  IP CONSULT TO CARDIOLOGY      Cardiac Monitoring [x] Yes [] No      Purposeful Hourly Rounding [x] Yes    Yu Score Total Score: 4   Yu score 3 or > [x] Bed Alarm [] Avasys [] 1:1 sitter [] Patient refused (Place signed refusal form in chart)   Jeffrey Score Jeffrey Score: 17   Jeffrey score 14 or < [] PMT consult [x] Wound Care consult    []  Specialty bed  [] Nutrition consult      Influenza Vaccine Received Flu Vaccine for Current Season (usually Sept-March): No    Patient/Guardian Refused (Notify MD): Yes      Oxygen needs? [] Room air Oxygen @  []1L    []2L    []3L   []4L    [x]5L   []6L     Use home O2?  [x] Yes [] No  Perform O2 challenge test using  smartphrase (.Homeoxygen)      Last bowel movement Last Bowel Movement Date: 01/21/18      Urinary Catheter             LDAs               Peripheral IV 01/20/18 Right Antecubital (Active)   Site Assessment Clean, dry, & intact 1/21/2018  2:05 PM   Phlebitis Assessment 0 1/21/2018  2:05 PM   Infiltration Assessment 0 1/21/2018  2:05 PM   Dressing Status Clean, dry, & intact 1/21/2018  2:05 PM   Dressing Type Tape;Transparent 1/21/2018  2:05 PM   Hub Color/Line Status Pink;Flushed;Patent 1/21/2018  2:05 PM   Action Taken Catheter retaped 1/21/2018  7:30 AM                         Readmission Risk Assessment Tool Score High Risk            22       Total Score        3 Has Seen PCP in Last 6 Months (Yes=3, No=0)    4 IP Visits Last 12 Months (1-3=4, 4=9, >4=11)    9 Pt. Coverage (Medicare=5 , Medicaid, or Self-Pay=4)    6 Charlson Comorbidity Score (Age + Comorbid Conditions)        Criteria that do not apply:    . Living with Significant Other. Assisted Living. LTAC. SNF.  or   Rehab    Patient Length of Stay (>5 days = 3)       Expected Length of Stay - - -   Actual Length of Stay 0

## 2018-01-21 NOTE — ED NOTES
Skin Assessment:    Patient with dry, dusky skin throughout. Patient's bilateral lower extremities with large, woody edema and dry skin. Multiple areas of bruising throughout upper and lower exrtemities.

## 2018-01-21 NOTE — ED NOTES
Bedside and Verbal shift change report given to Cathleen Villalobos RN (oncoming nurse). Report included the following information: SBAR, ED Summary, MAR and Recent Results.

## 2018-01-22 PROBLEM — J96.00 ACUTE RESPIRATORY FAILURE (HCC): Status: ACTIVE | Noted: 2018-01-22

## 2018-01-22 LAB
ANION GAP SERPL CALC-SCNC: 3 MMOL/L (ref 5–15)
BASOPHILS # BLD: 0 K/UL (ref 0–0.1)
BASOPHILS NFR BLD: 0 % (ref 0–1)
BUN SERPL-MCNC: 21 MG/DL (ref 6–20)
BUN/CREAT SERPL: 23 (ref 12–20)
CALCIUM SERPL-MCNC: 10.6 MG/DL (ref 8.5–10.1)
CHLORIDE SERPL-SCNC: 101 MMOL/L (ref 97–108)
CO2 SERPL-SCNC: 33 MMOL/L (ref 21–32)
CREAT SERPL-MCNC: 0.91 MG/DL (ref 0.55–1.02)
EOSINOPHIL # BLD: 0.2 K/UL (ref 0–0.4)
EOSINOPHIL NFR BLD: 2 % (ref 0–7)
ERYTHROCYTE [DISTWIDTH] IN BLOOD BY AUTOMATED COUNT: 14.1 % (ref 11.5–14.5)
GLUCOSE BLD STRIP.AUTO-MCNC: 125 MG/DL (ref 65–100)
GLUCOSE BLD STRIP.AUTO-MCNC: 144 MG/DL (ref 65–100)
GLUCOSE BLD STRIP.AUTO-MCNC: 154 MG/DL (ref 65–100)
GLUCOSE BLD STRIP.AUTO-MCNC: 286 MG/DL (ref 65–100)
GLUCOSE SERPL-MCNC: 110 MG/DL (ref 65–100)
HCT VFR BLD AUTO: 42.7 % (ref 35–47)
HGB BLD-MCNC: 12.9 G/DL (ref 11.5–16)
LYMPHOCYTES # BLD: 1.3 K/UL (ref 0.8–3.5)
LYMPHOCYTES NFR BLD: 13 % (ref 12–49)
MCH RBC QN AUTO: 28.5 PG (ref 26–34)
MCHC RBC AUTO-ENTMCNC: 30.2 G/DL (ref 30–36.5)
MCV RBC AUTO: 94.5 FL (ref 80–99)
MONOCYTES # BLD: 0.8 K/UL (ref 0–1)
MONOCYTES NFR BLD: 8 % (ref 5–13)
NEUTS SEG # BLD: 7.8 K/UL (ref 1.8–8)
NEUTS SEG NFR BLD: 77 % (ref 32–75)
PLATELET # BLD AUTO: 211 K/UL (ref 150–400)
POTASSIUM SERPL-SCNC: 4.2 MMOL/L (ref 3.5–5.1)
RBC # BLD AUTO: 4.52 M/UL (ref 3.8–5.2)
SERVICE CMNT-IMP: ABNORMAL
SODIUM SERPL-SCNC: 137 MMOL/L (ref 136–145)
WBC # BLD AUTO: 10.1 K/UL (ref 3.6–11)

## 2018-01-22 PROCEDURE — 74011636637 HC RX REV CODE- 636/637: Performed by: INTERNAL MEDICINE

## 2018-01-22 PROCEDURE — 74011250637 HC RX REV CODE- 250/637: Performed by: INTERNAL MEDICINE

## 2018-01-22 PROCEDURE — 77010033678 HC OXYGEN DAILY

## 2018-01-22 PROCEDURE — 94640 AIRWAY INHALATION TREATMENT: CPT

## 2018-01-22 PROCEDURE — 77030009526 HC GEL CARSYN MDII -A

## 2018-01-22 PROCEDURE — 74011250636 HC RX REV CODE- 250/636: Performed by: INTERNAL MEDICINE

## 2018-01-22 PROCEDURE — 85025 COMPLETE CBC W/AUTO DIFF WBC: CPT | Performed by: INTERNAL MEDICINE

## 2018-01-22 PROCEDURE — 36415 COLL VENOUS BLD VENIPUNCTURE: CPT | Performed by: INTERNAL MEDICINE

## 2018-01-22 PROCEDURE — G8980 MOBILITY D/C STATUS: HCPCS

## 2018-01-22 PROCEDURE — 80048 BASIC METABOLIC PNL TOTAL CA: CPT | Performed by: INTERNAL MEDICINE

## 2018-01-22 PROCEDURE — 93308 TTE F-UP OR LMTD: CPT

## 2018-01-22 PROCEDURE — 97116 GAIT TRAINING THERAPY: CPT

## 2018-01-22 PROCEDURE — G8979 MOBILITY GOAL STATUS: HCPCS

## 2018-01-22 PROCEDURE — 97165 OT EVAL LOW COMPLEX 30 MIN: CPT

## 2018-01-22 PROCEDURE — 94660 CPAP INITIATION&MGMT: CPT

## 2018-01-22 PROCEDURE — 77030011255 HC DSG AQUACEL AG BMS -A

## 2018-01-22 PROCEDURE — 74011250636 HC RX REV CODE- 250/636

## 2018-01-22 PROCEDURE — G8978 MOBILITY CURRENT STATUS: HCPCS

## 2018-01-22 PROCEDURE — 82962 GLUCOSE BLOOD TEST: CPT

## 2018-01-22 PROCEDURE — 97161 PT EVAL LOW COMPLEX 20 MIN: CPT

## 2018-01-22 PROCEDURE — 74011000250 HC RX REV CODE- 250: Performed by: INTERNAL MEDICINE

## 2018-01-22 PROCEDURE — 65270000029 HC RM PRIVATE

## 2018-01-22 RX ORDER — SODIUM CHLORIDE 0.9 % (FLUSH) 0.9 %
SYRINGE (ML) INJECTION
Status: DISCONTINUED
Start: 2018-01-22 | End: 2018-01-24 | Stop reason: HOSPADM

## 2018-01-22 RX ORDER — NYSTATIN 100000 U/G
CREAM TOPICAL
COMMUNITY
End: 2018-08-23

## 2018-01-22 RX ORDER — TRAMADOL HYDROCHLORIDE 50 MG/1
50 TABLET ORAL
Status: DISCONTINUED | OUTPATIENT
Start: 2018-01-22 | End: 2018-01-24 | Stop reason: HOSPADM

## 2018-01-22 RX ORDER — CYCLOBENZAPRINE HCL 10 MG
10 TABLET ORAL
COMMUNITY
End: 2018-02-03

## 2018-01-22 RX ORDER — HYDROXYZINE PAMOATE 50 MG/1
50 CAPSULE ORAL
COMMUNITY
End: 2020-01-29

## 2018-01-22 RX ORDER — AMMONIUM LACTATE 12 G/100G
LOTION TOPICAL 2 TIMES DAILY
Status: DISCONTINUED | OUTPATIENT
Start: 2018-01-22 | End: 2018-01-24 | Stop reason: HOSPADM

## 2018-01-22 RX ORDER — SILVER SULFADIAZINE 10 G/1000G
CREAM TOPICAL DAILY
Status: DISCONTINUED | OUTPATIENT
Start: 2018-01-23 | End: 2018-01-22

## 2018-01-22 RX ORDER — SILVER SULFADIAZINE 10 G/1000G
CREAM TOPICAL DAILY
Status: DISCONTINUED | OUTPATIENT
Start: 2018-01-23 | End: 2018-01-24 | Stop reason: HOSPADM

## 2018-01-22 RX ORDER — NYSTATIN 100000 [USP'U]/G
POWDER TOPICAL 2 TIMES DAILY
Status: DISCONTINUED | OUTPATIENT
Start: 2018-01-22 | End: 2018-01-24 | Stop reason: HOSPADM

## 2018-01-22 RX ADMIN — METOPROLOL TARTRATE 12.5 MG: 25 TABLET ORAL at 09:21

## 2018-01-22 RX ADMIN — PERFLUTREN: 6.52 INJECTION, SUSPENSION INTRAVENOUS at 13:20

## 2018-01-22 RX ADMIN — FLUTICASONE PROPIONATE 2 SPRAY: 50 SPRAY, METERED NASAL at 09:00

## 2018-01-22 RX ADMIN — LISINOPRIL 10 MG: 5 TABLET ORAL at 09:20

## 2018-01-22 RX ADMIN — FUROSEMIDE 40 MG: 10 INJECTION, SOLUTION INTRAMUSCULAR; INTRAVENOUS at 16:07

## 2018-01-22 RX ADMIN — PANTOPRAZOLE SODIUM 40 MG: 40 TABLET, DELAYED RELEASE ORAL at 09:21

## 2018-01-22 RX ADMIN — ASPIRIN 81 MG: 81 TABLET, COATED ORAL at 09:21

## 2018-01-22 RX ADMIN — INSULIN LISPRO 2 UNITS: 100 INJECTION, SOLUTION INTRAVENOUS; SUBCUTANEOUS at 09:21

## 2018-01-22 RX ADMIN — Medication 5 ML: at 06:00

## 2018-01-22 RX ADMIN — ENOXAPARIN SODIUM 40 MG: 40 INJECTION SUBCUTANEOUS at 20:55

## 2018-01-22 RX ADMIN — INSULIN LISPRO 2 UNITS: 100 INJECTION, SOLUTION INTRAVENOUS; SUBCUTANEOUS at 16:30

## 2018-01-22 RX ADMIN — INSULIN LISPRO 3 UNITS: 100 INJECTION, SOLUTION INTRAVENOUS; SUBCUTANEOUS at 21:06

## 2018-01-22 RX ADMIN — Medication 10 ML: at 23:29

## 2018-01-22 RX ADMIN — ALBUTEROL SULFATE 2.5 MG: 2.5 SOLUTION RESPIRATORY (INHALATION) at 16:13

## 2018-01-22 RX ADMIN — GLYBURIDE 5 MG: 5 TABLET ORAL at 09:20

## 2018-01-22 RX ADMIN — CYCLOBENZAPRINE HYDROCHLORIDE 5 MG: 10 TABLET, FILM COATED ORAL at 21:06

## 2018-01-22 RX ADMIN — NYSTATIN: 100000 POWDER TOPICAL at 17:48

## 2018-01-22 RX ADMIN — METHYLPREDNISOLONE SODIUM SUCCINATE 20 MG: 40 INJECTION, POWDER, FOR SOLUTION INTRAMUSCULAR; INTRAVENOUS at 16:07

## 2018-01-22 RX ADMIN — METHYLPREDNISOLONE SODIUM SUCCINATE 20 MG: 40 INJECTION, POWDER, FOR SOLUTION INTRAMUSCULAR; INTRAVENOUS at 10:00

## 2018-01-22 RX ADMIN — Medication 5 ML: at 14:00

## 2018-01-22 RX ADMIN — PRAVASTATIN SODIUM 40 MG: 40 TABLET ORAL at 21:07

## 2018-01-22 RX ADMIN — Medication 10 ML: at 21:06

## 2018-01-22 RX ADMIN — ENOXAPARIN SODIUM 40 MG: 40 INJECTION SUBCUTANEOUS at 09:21

## 2018-01-22 RX ADMIN — FUROSEMIDE 40 MG: 10 INJECTION, SOLUTION INTRAMUSCULAR; INTRAVENOUS at 09:20

## 2018-01-22 RX ADMIN — TRAMADOL HYDROCHLORIDE 50 MG: 50 TABLET, FILM COATED ORAL at 14:16

## 2018-01-22 RX ADMIN — CYCLOBENZAPRINE HYDROCHLORIDE 5 MG: 10 TABLET, FILM COATED ORAL at 09:53

## 2018-01-22 RX ADMIN — METOPROLOL TARTRATE 12.5 MG: 25 TABLET ORAL at 17:40

## 2018-01-22 NOTE — PHYSICIAN ADVISORY
Letter of Status Determination:   Recommend hospitalization status upgraded from   OBSERVATION  to INPATIENT  Status     Pt Name:  Aristides Obrien   MR#   [address] [facility] # [id number] /  [id number]   [facility]#  [id number]   [address] [place], [place] [address]/01  @ [facility]   Hospitalization date  1/20/2018 10:34 PM   Current Attending Physician  Leonarda Shah, *   Principal diagnosis  <principal problem not specified>   Chest pain    Clinicals  41 y.o. y.o  female hospitalized with above diagnosis   This morbidly obese (Body mass index is 64.87 kg/(m^2). ) patient who is on chronic home oxygen and who suffers from complex medical issues presented with above issues. ACS is ruled out. Further plan of care are in progress. [place] ([facility]) criteria   Does  NOT apply    STATUS DETERMINATION  This patient is at above high risk of deterioration based on documented presenting clinical data, comorbid conditions, high risk of adverse events and current acute care course. Ms. Aristides Obrien now meets Inpatient Admission status criteria in accordance with CMS regulation Section 43 .3. Specifically, due to medical necessity the patient's stay now exceeds Two Midnights. It is our recommendation that this patient's hospitalization status should be upgraded from  OBSERVATION to INPATIENT status. The final decision of the patient's hospitalization status depends on the attending physician's judgment            Additional comments     Payor: Nathaly Pa / Plan: Shaina Wolf / Product Type: Medicare /         Ingrid Rose MD MPH FACP     Physician [address]   President Medical Staff, [address] [place] [address]    Cell  [phone]        [phone]    .

## 2018-01-22 NOTE — CARDIO/PULMONARY
CP Rehab Note: chart review    Admit: chest pain r/t Chest pain in setting of chronic hypoxic respiratory failure, obesity hypoventilation and GEN/noncompliance with CPAP    Mhx: CAD, CHF, COPD, DM, HTN, NSTEMI, SVT, morbid obesity, stents    Current smoker. Echo pending. Cardiology consult. Added to discharge instructions:  Smoking Cessation Program:   This is a free,  phone/text/email based, smoking cessation program. The program is individualized to meet each patient's needs. To enroll use this link [email or web address]    CP Rehab deferred until additional assessment/test complete.

## 2018-01-22 NOTE — PROGRESS NOTES
Hospitalist Progress Note    NAME: Yamileth Gore   :  1970   MRN:  [id number]       Assessment / Plan:  Chest pain in setting of chronic hypoxic respiratory failure, obesity hypoventilation and GEN/noncompliance with CPAP:  Baseline O2 5 LPM  cardiology Inputs and recommendations Appreciated. troponins negative so far, no new EKG changes   ECHO. Ejection fraction wasestimated to be 55 %. Continue  diuresis with IV lasix 40 mg IV Q 12 hrs.  con't mevacor, lisinopril, metoprolol. Con't ASA. prn nitrobid  CPAP at night   Will follow up consult with CM for possible rehab placement on Discharge. Noninsulin dependent DM2 controlled without complication:  con't home glyburide  prn lispro sliding scale    Benign HTN:  con't lisinopril, metoprolol  prn nitrbid    Lymphedema with chronic LLE wound:    Wound care consulted , will follow up . COPD ,stable   Continue home breathing treatments   duoneb neb prn. Morbid obesity  Advised on diet . Body mass index is 64.87 kg/(m^2). Code status: Full  Prophylaxis: Lovenox  Recommended Disposition: SAH/Rehab     Subjective:     Chief Complaint / Reason for Physician Visit  I am having back pain. .  Discussed with RN events overnight. Review of Systems:  Symptom Y/N Comments  Symptom Y/N Comments   Fever/Chills n   Chest Pain n I had it earlier    Poor Appetite n   Edema     Cough y   Abdominal Pain     Sputum    Joint Pain n    SOB/RAZO y   Pruritis/Rash     Nausea/vomit n   Tolerating PT/OT     Diarrhea n   Tolerating Diet     Constipation n   Other       Could NOT obtain due to:      Objective:     VITALS:   Last 24hrs VS reviewed since prior progress note.  Most recent are:  Patient Vitals for the past 24 hrs:   Temp Pulse Resp BP SpO2   18 1119 97.5 °F (36.4 °C) (!) 103 20 113/72 90 %   18 0745 98.5 °F (36.9 °C) 95 18 116/77 90 %   18 0402 98 °F (36.7 °C) 100 20 102/57 90 %   18 0000 - - - - 92 %   18 2328 97.9 °F (36.6 °C) 96 20 100/64 90 %   01/21/18 2119 - - - - 95 %   01/21/18 2000 97.8 °F (36.6 °C) 92 18 91/77 90 %   01/21/18 1519 98 °F (36.7 °C) 92 18 112/68 90 %       Intake/Output Summary (Last 24 hours) at 01/22/18 1502  Last data filed at 01/22/18 1416   Gross per 24 hour   Intake                0 ml   Output             1200 ml   Net            -1200 ml        PHYSICAL EXAM:  General: WD, WN. Alert, cooperative, no acute distress   morbidly obese. EENT:  EOMI. Anicteric sclerae. MMM  Resp:  Decrease air entry in the lower lung fields, no crackles BL. No wheezing  CV:  Regular  rhythm,  No edema  GI:  Soft, Non distended, Non tender.  +Bowel sounds  Neurologic:  Alert and oriented X 3, normal speech,   Psych:   Good insight. Not anxious nor agitated  Skin:  No rashes. No jaundice  Chronic LE swelling , chronic skin changes,fingaiting horny thick skin on both LE and open wound on the left lower leg ,rapped up. Reviewed most current lab test results and cultures  YES  Reviewed most current radiology test results   YES  Review and summation of old records today    NO  Reviewed patient's current orders and MAR    YES  PMH/SH reviewed - no change compared to H&P  ________________________________________________________________________  Care Plan discussed with:    Comments   Patient y    Family      RN y    Care Manager     Consultant                        Multidiciplinary team rounds were held today with , nursing, pharmacist and clinical coordinator. Patient's plan of care was discussed; medications were reviewed and discharge planning was addressed.      ________________________________________________________________________  Total NON critical care TIME:  45   Minutes    Total CRITICAL CARE TIME Spent:   Minutes non procedure based      Comments   >50% of visit spent in counseling and coordination of care     ________________________________________________________________________  Jaycee Bai Meri Aguirre MD     Procedures: see electronic medical records for all procedures/Xrays and details which were not copied into this note but were reviewed prior to creation of Plan. LABS:  I reviewed today's most current labs and imaging studies. Pertinent labs include:  Recent Labs      01/22/18 0355 01/20/18 2306   WBC  10.1  11.9*   HGB  12.9  13.0   HCT  42.7  43.7   PLT  211  219     Recent Labs      01/22/18 0355 01/20/18 2306   NA  137  140   K  4.2  4.6   CL  101  103   CO2  33*  33*   GLU  110*  107*   BUN  21*  18   CREA  0.91  0.94   CA  10.6*  10.9*   ALB   --   3.1*   TBILI   --   0.3   SGOT   --   15   ALT   --   14       Signed:  Jose Enrique Salinas MD

## 2018-01-22 NOTE — WOUND CARE
Wound Care Nurse consult from staff nurse for \" lymphedema with LLE wound, skin is red and moist under bilateral breasts and under abdominal folds/ groin area\". Patient is a super morbid obese 42 y/o AAF admitted with chest pain. She 5'6\", 401 lb non-compliant with care on so many levels  Past Medical History:   Diagnosis Date    Arthritis     Asthma     CAD (coronary artery disease)     [facility]     COPD (chronic obstructive pulmonary disease) [facility]     Diabetes ([facility]     Hypertension     Morbid obesity with BMI of 70 and over, adult ([facility]     NSTEMI (non-ST elevated myocardial infarction) ([facility]     SVT (supraventricular tachycardia) ([facility]      Lymphedema to BLE with a wound to LLE. BLE are scaly, dark plaques consistent with chronic edema. The posterior lower leg has a foul smelling, purulent draining wound that is basically cracks and lymphedema \"bumps\" of skin. WOUND POA CONDITIONS        Wound Leg Left; Lower; Posterior (Active)   DRESSING STATUS Removed 1/22/2018  4:18 PM   DRESSING TYPE Dry dressing 1/22/2018  4:18 PM   Non-Pressure Injury Partial thickness (epider/derm) 1/22/2018  4:18 PM   Wound Length (cm) 10 cm 1/22/2018  4:18 PM   Wound Width (cm) 10 cm 1/22/2018  4:18 PM   Wound Depth (cm) 0.3 1/22/2018  4:18 PM   Wound Surface area (cm^3) 30 cm^2 1/22/2018  4:18 PM   Condition of Base Other (comment) 1/22/2018  4:18 PM   Tissue Type Other (comment) 1/22/2018  4:18 PM   Drainage Amount  Moderate 1/22/2018  4:18 PM   Drainage Color Tan 1/22/2018  4:18 PM   Wound Odor Strong; Foul 1/22/2018  4:18 PM   Periwound Skin Condition Other (comment) 1/22/2018  4:18 PM   Cleansing and Cleansing Agents  Dermal wound cleanser 1/22/2018  4:18 PM   Dressing Type Applied Silver products;Dry dressing 1/22/2018  4:18 PM   Procedure Tolerated Well 1/22/2018  4:18 PM   Number of days:1           Recommend:    Left posterior LE wound: BID, cleanse with 1/2 strength Dakins solution, wipe hard and in cracks/folds. Apply Silvadene cream to wound and cover with dry 4x4's, ABD and secure with julian. BLE\" thick scaly skin - cleanse with soap and water, pat dry and apply Lac-Hydrin lotion - thick    Plan: patient was seen by [facility] nurse last admission and she did not follow through with any recommended care to go to Lymphedema clinic at [facility] or [facility]. She refuses to elevate legs.     Yimi Watson RN, [employer]

## 2018-01-22 NOTE — PROGRESS NOTES
Cardiopulmonary Care Interdisciplinary rounds were held today to discuss patient plan of care and outcomes. The following members were present: NP/Physician, PT, Pharmacy, Nursing, Nutritionist and Case Management.     Expected Length of Stay:  - - -  Geometric Length of Stay:      Plan of Care: Continue current treatment plan

## 2018-01-22 NOTE — PROGRESS NOTES
1530: Bedside report received from Song Correia, [address] [place] [address]. Assumed care of patient.

## 2018-01-22 NOTE — DIABETES MGMT
DTC Progress Note    Recommendations/ Comments: Please consider decreasing glyburide to 2.5mg ac b. Pt with hypoglycemia yesterday. Chart reviewed on Isael Ege for hyperglycemia. Patient is a 41 y.o. female with known history of Type 2 Diabetes on Glyburide 5mg daily at home. A1c:   Lab Results   Component Value Date/Time    Hemoglobin A1c 5.3 07/25/2017 05:30 AM    Hemoglobin A1c 7.1 10/28/2016 04:25 AM       Recent Glucose Results:   Lab Results   Component Value Date/Time     (H) 01/22/2018 03:55 AM    GLUCPOC 125 (H) 01/22/2018 11:54 AM    GLUCPOC 144 (H) 01/22/2018 07:55 AM    GLUCPOC 146 (H) 01/21/2018 09:15 PM        Lab Results   Component Value Date/Time    Creatinine 0.91 01/22/2018 03:55 AM     Estimated Creatinine Clearance: 133.7 mL/min (based on Cr of 0.91). Active Orders   Diet    DIET DIABETIC CONSISTENT CARB Regular; 2 GM NA (House Low NA); FR 1500ML        PO intake: No data found. Current hospital DM medication:   -Humalog insulin resistant correction  -glyburide 5mg ac b      Will continue to follow as needed.     Thank you  MYA Stevens, RN, [address]

## 2018-01-22 NOTE — PROGRESS NOTES
0700: Report received from Tommy Brunson, NORMA PADRON, Luis Yap, ED SUMMARY, [place], RECENT RESULTS were discussed.     Sheila Alvarado RN

## 2018-01-22 NOTE — PROGRESS NOTES
ADULT PROTOCOL: JET AEROSOL ASSESSMENT    Patient  Oren Poster     41 y.o.   female     1/21/2018  10:51 PM    Breath Sounds Pre Procedure: Right Breath Sounds: Diminished                               Left Breath Sounds: Diminished    Breath Sounds Post Procedure: Right Breath Sounds: Diminished                                 Left Breath Sounds: Diminished    Breathing pattern: Pre procedure Breathing Pattern: Regular          Post procedure Breathing Pattern: Regular    Heart Rate: Pre procedure Pulse: 70           Post procedure Pulse: 84    Resp Rate: Pre procedure Respirations: 18           Post procedure Respirations: 18    Peak Flow: Pre bronchodilator             Post bronchodilator       FVC/FEV1:  N/A    Incentive Spirometry:             Cough: Pre procedure Cough: Non-productive, Congested               Post procedure Cough: Non-productive    Suctioned: NO    Sputum: Pre procedure                   Post procedure      Oxygen: O2 Device: Nasal cannula   2L NC     Changed: YES    SpO2: Pre procedure SpO2: 95 %   with oxygen              Post procedure    with oxygen    Nebulizer Therapy: Current medications Aerosolized Medications: Albuterol      Changed: YES    Smoking History: pack years of use current smoker      Problem List:   Patient Active Problem List   Diagnosis Code    CHF (congestive heart failure) ([facility]) I50.9    Malignant essential hypertension I10    Asthma J45.909    Obesity hypoventilation syndrome ([facility]) E66.2    Dyspnea R06.00    Chest pressure R07.89    Acute on chronic diastolic heart failure ([facility]) I50.33    SVT (supraventricular tachycardia) ([facility]) I47.1    NSTEMI (non-ST elevated myocardial infarction) ([facility] I21.4    S/P PTCA (percutaneous transluminal coronary angioplasty) Z98.61    Coronary atherosclerosis of native coronary artery I25.10    Hypoxia R09.02    Noncompliance with therapeutic plan Z91.11    Chest pain R07.9    GERD (gastroesophageal reflux disease) K21.9    Acute respiratory failure with hypoxia and hypercarbia ([facility]) J96.01, J96.02    COPD (chronic obstructive pulmonary disease) ([facility]) J44.9    Tobacco abuse Z72.0    Obesity, morbid (more than 100 lbs over ideal weight or BMI > 40) ([facility]) E66.01    Cellulitis L03.90    SIRS due to infectious process with acute organ dysfunction ([facility]) A41.9, R65.20    Sepsis associated hypotension ([facility]) A41.9    Gangrenous toe ([facility]) I96    Type II diabetes mellitus, uncontrolled ([facility]) E11.65    HTN (hypertension) W26    Diastolic CHF, chronic ([facility]) I50.32    Cough with hemoptysis R04.2    Lymphedema of both lower extremities I89.0    CAP (community acquired pneumonia) J18.9    Cellulitis, leg L03.119    COPD exacerbation ([facility]) J44.1    Maggot infestation B87.9    SOB (shortness of breath) R06.02    Shortness of breath R06.02    Multifocal pneumonia J18.9       Respiratory Therapist: Deanna Rojas, RT

## 2018-01-22 NOTE — PROGRESS NOTES
physical Therapy EVALUATION/DISCHARGE  Patient: Blanca Lee (16 y.o. female)  Date: 1/22/2018  Primary Diagnosis: Chest pain  Acute respiratory failure ([address]        Precautions: Contact     ASSESSMENT :  Based on the objective data described below, the patient presents with morbid obesity which impairs activity tolerance and gait mechanics however good strength, intact balance, and overall baseline modified independent functional mobility. Pt received seated in bedside chair on 5 LPM O2 and agreeable to participation with therapy. Pt performed all aspects of functional mobility at supervision level including sit<>stand transfers (from bedside chair and bedside commode), standing pericare, and ambulation trial of 15ft x2 (Standing rest break at sink for handwashing). Gait speed decreased with mild increase in lateral trunk sway (due to body habitus) however intact balance throughout. All mobility occurred on 5 LPM O2 with O2 sats 79-80% and HR elevated to 112 BPM. Pt required x90 seconds of seated rest and continued 5 LPM O2 for O2 sats to recover to >90%. At this time, pt has no further skilled therapy needs in the acute care setting as baseline morbid obesity and chronic noncompliance are her biggest limiting factors. Pt declining pulmonary rehab or discharging to rehab facility, adamant about returning home. Recommend HH PT for home safety evaluation and to continue education regarding energy conservation/activity pacing. Skilled physical therapy is not indicated at this time. PLAN :  Discharge Recommendations: Home Health PT/OT - pt refusing pulmonary rehab  Further Equipment Recommendations for Discharge: None, owns necessary equipment     SUBJECTIVE:   Patient stated There's no one to pay my bills. I am going home.     OBJECTIVE DATA SUMMARY:   HISTORY:    Past Medical History:   Diagnosis Date    Arthritis     Asthma     CAD (coronary artery disease)     Congestive heart failure (HCC)     [facility]     [facility]     Hypertension     Morbid obesity with BMI of 70 and over, adult ([facility]     NSTEMI (non-ST elevated myocardial infarction) ([facility]     SVT (supraventricular tachycardia) ([facility]      Past Surgical History:   Procedure Laterality Date    CARDIAC SURG PROCEDURE UNLIST      Stents    HX  SECTION      HX ORTHOPAEDIC      HX OTHER SURGICAL      cyst removed from back     Prior Level of Function/Home Situation: Pt lives at home alone however nephew and son check on her several days/wk. Pt reports independence with functional mobility, stating she relies on furniture/walls as her rollator walker does not fit inside her home and has broken brakes. Mostly sedentary, only ambulating short distances before needing to sit. Wears 5 LPM O2 at baseline. Needs assist with ADL's and self care secondary to body habitus. Has aide that comes daily but leaves at 3 PM.  States she doesn't leave the house much and that her MD makes house calls because she has no way to get there  Personal factors and/or comorbidities impacting plan of care:     Home Situation  Home Environment: Private residence  # Steps to Enter: 5  Rails to Enter: Yes  Hand Rails : Left  One/Two Story Residence: One story  Living Alone: Yes  Support Systems: Family member(s)  Patient Expects to be Discharged to[de-identified] Private residence  Current DME Used/Available at Home: Nebulizer, Commode, bedside, Walker, rollator  Tub or Shower Type:  (sponge bathes )    EXAMINATION/PRESENTATION/DECISION MAKING:   Critical Behavior:              Hearing:   Auditory  Auditory Impairment: None  Skin:  Dressings to BLE wounds intact  Range Of Motion:  AROM: Generally decreased, functional                       Strength:    Strength: Generally decreased, functional                    Tone & Sensation:   Tone: Normal                              Coordination:  Coordination: Within functional limits  Vision: Functional Mobility:  Bed Mobility:  Rolling: Other (comment) (seated in bedside chair upon arrival )           Transfers:  Sit to Stand: Supervision  Stand to Sit: Supervision                       Balance:   Sitting: Intact  Standing: Intact  Ambulation/Gait Training:  Distance (ft): 30 Feet (ft) (15ft x2 w/ standing rest break b/t)  Assistive Device: Gait belt  Ambulation - Level of Assistance: Supervision        Gait Abnormalities: Decreased step clearance        Base of Support: Widened     Speed/Danay: Pace decreased (<100 feet/min)  Step Length: Left shortened;Right shortened                          Functional Measure:  Barthel Index:    Bathin  Bladder: 10  Bowels: 10  Groomin  Dressin  Feeding: 10  Mobility: 0  Stairs: 10  Toilet Use: 10  Transfer (Bed to Chair and Back): 15  Total: 75       Barthel and G-code impairment scale:  Percentage of impairment CH  0% CI  1-19% CJ  20-39% CK  40-59% CL  60-79% CM  80-99% CN  100%   Barthel Score 0-100 100 99-80 79-60 59-40 20-39 1-19   0   Barthel Score 0-20 20 17-19 13-16 9-12 5-8 1-4 0      The Barthel ADL Index: Guidelines  1. The index should be used as a record of what a patient does, not as a record of what a patient could do. 2. The main aim is to establish degree of independence from any help, physical or verbal, however minor and for whatever reason. 3. The need for supervision renders the patient not independent. 4. A patient's performance should be established using the best available evidence. Asking the patient, friends/relatives and nurses are the usual sources, but direct observation and common sense are also important. However direct testing is not needed. 5. Usually the patient's performance over the preceding 24-48 hours is important, but occasionally longer periods will be relevant. 6. Middle categories imply that the patient supplies over 50 per cent of the effort. 7. Use of aids to be independent is allowed.     [employer] Barthel, DMangoW. (1965). Functional evaluation: the Barthel Index. [address] [facility] (14)2. PEACE Oconnor, Angi Santos., Henry Becerril., Kimberly, [address] (1999). Measuring the change indisability after inpatient rehabilitation; comparison of the responsiveness of the Barthel Index and Functional [place] Measure. Journal of Neurology, Neurosurgery, and Psychiatry, 66(4), 421-846. KRIS Perez, AGUSTO Zarco, & Rachele Lazo M.A. (2004.) Assessment of post-stroke quality of life in cost-effectiveness studies: The usefulness of the Barthel Index and the EuroQoL-5D. Quality of Life Research, 13, 512-07       G codes: In compliance with CMSs Claims Based Outcome Reporting, the following G-code set was chosen for this patient based on their primary functional limitation being treated: The outcome measure chosen to determine the severity of the functional limitation was the Barthel Index with a score of 75/100 which was correlated with the impairment scale.     ? Mobility - Walking and Moving Around:     - CURRENT STATUS: CJ - 20%-39% impaired, limited or restricted    - GOAL STATUS: CJ - 20%-39% impaired, limited or restricted    - D/C STATUS:  CJ - 20%-39% impaired, limited or restricted        Physical Therapy Evaluation Charge Determination   History Examination Presentation Decision-Making   MEDIUM  Complexity : 1-2 comorbidities / personal factors will impact the outcome/ POC  MEDIUM Complexity : 3 Standardized tests and measures addressing body structure, function, activity limitation and / or participation in recreation  MEDIUM Complexity : Evolving with changing characteristics  MEDIUM Complexity : FOTO score of 26-74      Based on the above components, the patient evaluation is determined to be of the following complexity level: MEDIUM    Pain:  Pain Scale 1: Numeric (0 - 10)  Pain Intensity 1: 5  Pain Location 1: Back;Buttocks  Activity Tolerance:   HR elevated to 112 BPM with O2 sats 79-80% post ambulation on 5 LPM  Please refer to the flowsheet for vital signs taken during this treatment. After treatment:   [x]   Patient left in no apparent distress sitting up in chair  []   Patient left in no apparent distress in bed  [x]   Call bell left within reach  [x]   Nursing notified  [x]   Caregiver present  []   Bed alarm activated    COMMUNICATION/EDUCATION:   Communication/Collaboration:  [x]   Fall prevention education was provided and the patient/caregiver indicated understanding. [x]   Patient/family have participated as able and agree with findings and recommendations. []   Patient is unable to participate in plan of care at this time.   Findings and recommendations were discussed with: Occupational Therapist, Registered Nurse and     Thank you for this referral.  Wilbur Wagner, PT, DPT   Time Calculation: 20 mins

## 2018-01-22 NOTE — PROGRESS NOTES
0700: Bedside report received from Demetri Aquino, [address] [place] [address]. Assumed care of patient.

## 2018-01-22 NOTE — PROGRESS NOTES
Ellsworth Alpers is a 41 yr old AAF who has been admitted due to chest pain in a setting of hypoxic respiratory failure, obesity, HTN and uncontrolled DM. Patient stated she lives alone in a 1 story home with 5 steps to the entrance. The face sheet had APT A which has been removed. Patient has home oxygen at 5 L through [facility]. Patient also has a CPAP. Her  nebulizer is broken. Daily patient has an aid from 9:30 AM to 3:30 PM. Therapy has mobilized and pulmonary rehab has been recommended but patient declined. Patient is in agreement to Home health services and has requested [address]. Have requested from [employer] to check if patient is eligible for a new rollator and nebulizer.  Will send referral to [facility] once the fax lines are up and operational.       Care Management Interventions  Physical Therapy Consult: Yes  Occupational Therapy Consult: Yes  Speech Therapy Consult: No  Current Support Network: Lives Alone  Plan discussed with Pt/Family/Caregiver: Yes  Freedom of Choice Offered: Yes     [phone]

## 2018-01-22 NOTE — PROGRESS NOTES
Occupational Therapy EVALUATION/discharge  Patient: Anupama Dias (73 y.o. female)  Date: 2018  Primary Diagnosis: Chest pain  Acute respiratory failure ([facility]        Precautions: contact       ASSESSMENT:   Based on the objective data described below, the patient presents close to ADL and functional mboility baseline. Pt with fair balance without AD, supervision for transfers to [facility], standng hygeine and standing at sink to wash hands. With activity, pt's O2 dropped to 78-80% on baseline 5 L. Pt required 90 seconds to return to 90%. She is functioning close toher ADL baseline, needing up to mod A for LB ADLs due to poor functional reach, impaired activity tolerance and body habitus. Pt has hx of non compliance in the past and limited participation with acute care therapy. Pt is declining any form of rehab post discharge and is agreeable to home with [facility] and PT  Further skilled acute occupational therapy is not indicated at this time. Discharge Recommendations: [facility] and PT. Pt is refusing rehab at discharge. Further Equipment Recommendations for Discharge: new rollator      SUBJECTIVE:   Patient stated I haven't been able to use my rollator. Brakes wont work and the tread is coming off one of the tires.     OBJECTIVE DATA SUMMARY:   HISTORY:   Past Medical History:   Diagnosis Date    Arthritis     Asthma     CAD (coronary artery disease)     [facility]estive heart failure ([facility]     COPD (chronic obstructive pulmonary disease) ([facility]     Diabetes ([facility]     Hypertension     Morbid obesity with BMI of 70 and over, adult ([facility]     NSTEMI (non-ST elevated myocardial infarction) ([facility]     SVT (supraventricular tachycardia) ([facility]      Past Surgical History:   Procedure Laterality Date    CARDIAC SURG PROCEDURE UNLIST      Stents    HX  SECTION      HX ORTHOPAEDIC      HX OTHER SURGICAL      cyst removed from back       Prior Level of Function/Environment/Context: Independent with functional mobility. Needs assist with ADL's and self care secondary to body habitus. Has aide that comes daily but leaves at 3 PM.  States she doesn't leave the house much and that her MD makes house calls because she has no way to get there. Pt uses rollator intermittently, reports brakes don't work but it doesn't really fit within confines of the house. wears 5 L  Occupations in which the patient is/was successful, what are the barriers preventing that success: non compliance, obesity  Performance Patterns (routines, roles, habits, and rituals):   Personal Interests and/or values:   Expanded or extensive additional review of patient history:     Home Situation  Home Environment: Private residence  # Steps to Enter: 5  Rails to Enter: Yes  Hand Rails : Left  One/Two Story Residence: One story  Living Alone: Yes  Support Systems: Family member(s)  Patient Expects to be Discharged to[de-identified] Private residence  Current DME Used/Available at Home: Nebulizer, Commode, bedside, Walker, rollator  Tub or Shower Type:  (sponge bathes )  [x]  Right hand dominant   []  Left hand dominant    EXAMINATION OF PERFORMANCE DEFICITS:  Cognitive/Behavioral Status:                      Skin: chronic L LE wound    Edema: B LEs    Hearing: Auditory  Auditory Impairment: None    Vision/Perceptual:    Tracking: Able to track stimulus in all quadrants w/o difficulty                                Range of Motion:    AROM: Generally decreased, functional                         Strength:    Strength: Generally decreased, functional                Coordination:  Coordination: Within functional limits  Fine Motor Skills-Upper: Left Intact; Right Intact    Gross Motor Skills-Upper: Left Intact; Right Intact    Tone & Sensation:    Tone: Normal                         Balance:  Sitting: Intact  Standing: Intact    Functional Mobility and Transfers for ADLs:  Bed Mobility:  Rolling: Other (comment) (seated in bedside chair upon arrival )    Transfers:  Sit to Stand: Supervision  Stand to Sit: Supervision  Toilet Transfer : Supervision (to [facility])    ADL Assessment:  Feeding: Independent    Oral Facial Hygiene/Grooming: Supervision    Bathing: Moderate assistance (body habitus, poor functional reach)    Upper Body Dressing: Supervision    Lower Body Dressing: Moderate assistance (decreased functional reach, body habitus)    Toileting: Supervision (on/off BSC, supervision standing hygiene)                ADL Intervention and task modifications:     Pt educated on role of OT and required verbal cues for safety and proper hand placement during ADLs/functional transfers. Patient instructed and indicated understanding the benefits of maintaining activity tolerance, functional mobility, and independence with self care tasks during acute stay to ensure safe return home and to baseline. Encouraged patient to increase frequency and duration OOB, be out of bed for all meals, perform daily ADLs (as approved by RN/MD regarding bathing etc), and performing functional mobility to/from bathroom. Functional Measure:  Barthel Index:    Bathin  Bladder: 10  Bowels: 10  Groomin  Dressin  Feeding: 10  Mobility: 0  Stairs: 10  Toilet Use: 10  Transfer (Bed to Chair and Back): 15  Total: 75       Barthel and G-code impairment scale:  Percentage of impairment CH  0% CI  1-19% CJ  20-39% CK  40-59% CL  60-79% CM  80-99% CN  100%   Barthel Score 0-100 100 99-80 79-60 59-40 20-39 1-19   0   Barthel Score 0-20 20 17-19 13-16 9-12 5-8 1-4 0      The Barthel ADL Index: Guidelines  1. The index should be used as a record of what a patient does, not as a record of what a patient could do. 2. The main aim is to establish degree of independence from any help, physical or verbal, however minor and for whatever reason. 3. The need for supervision renders the patient not independent. 4. A patient's performance should be established using the best available evidence.  Asking the patient, friends/relatives and nurses are the usual sources, but direct observation and common sense are also important. However direct testing is not needed. 5. Usually the patient's performance over the preceding 24-48 hours is important, but occasionally longer periods will be relevant. 6. Middle categories imply that the patient supplies over 50 per cent of the effort. 7. Use of aids to be independent is allowed. Mame Cabello., Barthel, D.W. ([id number]). Functional evaluation: the Barthel Index. [address] [facility] (14)2. Brando Liao rickie PEACE Hernandes, Teagan Jones., Mike House., [place], [address] [facility] (1999). Measuring the change indisability after inpatient rehabilitation; comparison of the responsiveness of the Barthel Index and Functional [place] Measure. Journal of Neurology, Neurosurgery, and Psychiatry, 66(4), 911-543. Willem Ziegler, NMangoJ.A, AGUSTO Zarco, & Jg Parry M.A. (2004.) Assessment of post-stroke quality of life in cost-effectiveness studies: The usefulness of the Barthel Index and the EuroQoL-5D. Quality of Life Research, 13, 796-41         G codes: In compliance with CMSs Claims Based Outcome Reporting, the following G-code set was chosen for this patient based on their primary functional limitation being treated: The outcome measure chosen to determine the severity of the functional limitation was the barthel index with a score of 75/100 which was correlated with the impairment scale. ? Self Care:     - CURRENT STATUS: CJ - 20%-39% impaired, limited or restricted    - GOAL STATUS: CJ - 20%-39% impaired, limited or restricted    - D/C STATUS:  CJ - 20%-39% impaired, limited or restricted     Pain:  Pain Scale 1: Numeric (0 - 10)  Pain Intensity 1: 5  Pain Location 1: Back;Buttocks     Pain Description 1: Aching  Pain Intervention(s) 1: Medication (see MAR)  Activity Tolerance:   fair  Please refer to the flowsheet for vital signs taken during this treatment.   After treatment:   [x]  Patient left in no apparent distress sitting up in chair  []  Patient left in no apparent distress in bed  [x]  Call bell left within reach  [x]  Nursing notified  []  Caregiver present  []  Bed alarm activated    COMMUNICATION/EDUCATION:   Communication/Collaboration:  []      Home safety education was provided and the patient/caregiver indicated understanding. [x]      Patient/family have participated as able and agree with findings and recommendations. []      Patient is unable to participate in plan of care at this time.   Findings and recommendations were discussed with: Physical Therapist, Registered Nurse and     [place] OT  Time Calculation: 19 mins

## 2018-01-22 NOTE — PROGRESS NOTES
1/22/2018 6:32 PM    Admit Date: 1/20/2018    Admit Diagnosis:   Chest pain;Acute respiratory failure (HCC)    Subjective:     Daisy Nowak denies chest pain.     Current Facility-Administered Medications   Medication Dose Route Frequency    nystatin (MYCOSTATIN) 100,000 unit/gram powder   Topical BID    methylPREDNISolone (PF) (SOLU-MEDROL) injection 20 mg  20 mg IntraVENous Q8H    traMADol (ULTRAM) tablet 50 mg  50 mg Oral Q12H PRN    sodium chloride (NS) 0.9 % flush        sodium hypochlorite (QUARTER STRENGTH DAKIN'S) 0.125% irrigation (bottle)   Topical BID    ammonium lactate (LAC-HYDRIN) 12 % lotion   Topical BID    [START ON 1/23/2018] silver sulfADIAZINE (SILVADENE) 1 % topical cream   Topical DAILY    albuterol (PROVENTIL VENTOLIN) nebulizer solution 2.5 mg  2.5 mg Nebulization Q4H PRN    aspirin delayed-release tablet 81 mg  81 mg Oral DAILY    benzonatate (TESSALON) capsule 100 mg  100 mg Oral TID PRN    cetirizine (ZYRTEC) tablet 10 mg  10 mg Oral DAILY PRN    fluticasone (FLONASE) 50 mcg/actuation nasal spray 2 Spray  2 Spray Both Nostrils DAILY    glyBURIDE (DIABETA) tablet 5 mg  5 mg Oral ACB    pravastatin (PRAVACHOL) tablet 40 mg  40 mg Oral QHS    pantoprazole (PROTONIX) tablet 40 mg  40 mg Oral ACB    sodium chloride (NS) flush 5-10 mL  5-10 mL IntraVENous Q8H    sodium chloride (NS) flush 5-10 mL  5-10 mL IntraVENous PRN    acetaminophen (TYLENOL) tablet 650 mg  650 mg Oral Q4H PRN    ondansetron (ZOFRAN) injection 4 mg  4 mg IntraVENous Q4H PRN    bisacodyl (DULCOLAX) tablet 5 mg  5 mg Oral DAILY PRN    enoxaparin (LOVENOX) injection 40 mg  40 mg SubCUTAneous Q12H    ketorolac (TORADOL) injection 30 mg  30 mg IntraVENous Q6H PRN    insulin lispro (HUMALOG) injection   SubCUTAneous AC&HS    glucose chewable tablet 16 g  4 Tab Oral PRN    dextrose (D50W) injection syrg 12.5-25 g  12.5-25 g IntraVENous PRN    glucagon (GLUCAGEN) injection 1 mg  1 mg IntraMUSCular PRN    nitroglycerin (NITROBID) 2 % ointment 1 Inch  1 Inch Topical Q6H PRN    metoprolol tartrate (LOPRESSOR) tablet 12.5 mg  12.5 mg Oral BID    furosemide (LASIX) injection 40 mg  40 mg IntraVENous ACB&D    lisinopril (PRINIVIL, ZESTRIL) tablet 10 mg  10 mg Oral DAILY    cyclobenzaprine (FLEXERIL) tablet 5 mg  5 mg Oral BID PRN         Objective:      Physical Exam:    Visit Vitals    /66    Pulse 86    Temp 98.6 °F (37 °C)    Resp 20    Ht 5' 6\" (1.676 m)    Wt (!) 401 lb 14.4 oz (182.3 kg)    SpO2 93%    Breastfeeding No    BMI 64.87 kg/m2     Gen:  NAD  Mental Status - Alert. General Appearance - Not in acute distress. Chest and Lung Exam   Inspection: Accessory muscles - No use of accessory muscles in breathing. Auscultation:   Breath sounds: - Normal.   Cardiovascular   Inspection: Jugular vein - Bilateral - Inspection Normal.   Palpation/Percussion:   Apical Impulse: - Normal.   Auscultation: Rhythm - Regular. Heart Sounds - S1 WNL and S2 WNL. No S3 or S4. Murmurs & Other Heart Sounds: Auscultation of the heart reveals - No Murmurs. Peripheral Vascular   Upper Extremity: Inspection - Bilateral - No Cyanotic nailbeds or Digital clubbing. Lower Extremity:   Palpation: Edema - Bilateral - No edema. Abdomen:   Soft, non-tender, bowel sounds are active.   Neuro: A&O times 3, CN and motor grossly WNL    Data Review:   Recent Labs      01/22/18   0355  01/20/18   2306   WBC  10.1  11.9*   HGB  12.9  13.0   HCT  42.7  43.7   PLT  211  219     Recent Labs      01/22/18   0355  01/20/18   2306   NA  137  140   K  4.2  4.6   CL  101  103   CO2  33*  33*   GLU  110*  107*   BUN  21*  18   CREA  0.91  0.94   CA  10.6*  10.9*   ALB   --   3.1*   TBILI   --   0.3   SGOT   --   15   ALT   --   14       Recent Labs      01/21/18   1111  01/21/18   0532  01/20/18   2306   TROIQ  <0.04  <0.04  <0.04         Intake/Output Summary (Last 24 hours) at 01/22/18 183  Last data filed at 01/22/18 3177 Gross per 24 hour   Intake                0 ml   Output             1000 ml   Net            -1000 ml        Telemetry: normal sinus rhythm  Echo:  SUMMARY:  Left ventricle: Systolic function was normal. Ejection fraction was  estimated in the range of 55 % to 60 %. Suboptimal endocardial  visualization limits wall motion analysis. Assessment:     Active Problems:    Malignant essential hypertension (8/4/2012)      Chest pain (10/22/2012)      Acute respiratory failure with hypoxia and hypercarbia (HCC) (1/10/2016)      Acute respiratory failure (HCC) (1/22/2018)        Plan:     Chest tightness in the setting of acute hypoxic respiratory failure:  History of CAD including  of LAD, REED to Ramus in the past, but EKG, enzymes and echo normal.  Likely related to acute respiratory failure. No further ischemic evaluation needed at this time. Follow-up with me within 2-4 weeks of discharge to consider further evaluation. Thanks for the consult. I will sign off for now. Please call if any questions or concerns.

## 2018-01-22 NOTE — PROGRESS NOTES
Pharmacy Medication Reconciliation     The patient was interviewed regarding current PTA medication list, use and drug allergies. The patient was questioned regarding use of any other inhalers, topical products, over the counter medications, herbal medications, vitamin products or ophthalmic/nasal/otic medication use. Allergy Update: NKA    Recommendations/Findings: The following amendments were made to the patient's active medication list on file at [facility]:     1) Additions: Nystatin cream twice daily as needed, hydroxyzine pamoate 50 mg every 8 hours as needed for itching and cyclobenzaprine 10 mg twice daily as needed    2) Deletions: Probiotic and clotrimazole 1% cream    3) Changes: furosemide written for twice daily, but patient only takes it once a day      -Clarified PTA med list with patient. PTA medication list was corrected to the following:     Prior to Admission Medications   Prescriptions Last Dose Informant Patient Reported? Taking? albuterol (PROVENTIL VENTOLIN) 2.5 mg /3 mL (0.083 %) nebulizer solution   No No   Sig: 3 mL by Nebulization route every four (4) hours as needed for Wheezing. albuterol (PROVENTIL, VENTOLIN) 90 mcg/actuation inhaler   No No   Sig: Take 1-2 Puffs by inhalation every four (4) hours as needed for Wheezing. ammonium lactate (LAC-HYDRIN) 12 % topical cream   No No   Sig: rub in to affected area well   aspirin delayed-release 81 mg tablet   Yes No   Sig: Take 81 mg by mouth daily. cetirizine (ZYRTEC) 10 mg tablet   Yes No   Sig: Take 10 mg by mouth daily as needed for Allergies. cyclobenzaprine (FLEXERIL) 10 mg tablet Unknown at Unknown time  Yes No   Sig: Take 10 mg by mouth two (2) times daily as needed for Muscle Spasm(s). Indications: Muscle Spasm   fluticasone (FLONASE) 50 mcg/actuation nasal spray   No No   Si Sprays by Both Nostrils route daily. furosemide (LASIX) 40 mg tablet  Self Yes No   Sig: Take 40 mg by mouth daily.    glyBURIDE (DIABETA) 5 mg tablet   Yes No   Sig: Take 5 mg by mouth Daily (before breakfast). hydrOXYzine pamoate (VISTARIL) 50 mg capsule Unknown at Unknown time  Yes No   Sig: Take 50 mg by mouth every eight (8) hours as needed for Itching. Indications: Pruritus of Skin   ketoconazole (NIZORAL) 2 % topical cream  Self Yes No   Sig: Apply  to affected area daily. lisinopril (PRINIVIL, ZESTRIL) 40 mg tablet   Yes No   Sig: Take 40 mg by mouth daily. lovastatin (MEVACOR) 40 mg tablet   No No   Sig: Take 1 Tab by mouth nightly. metoprolol (LOPRESSOR) 25 mg tablet   Yes No   Sig: Take 25 mg by mouth two (2) times a day. nitroglycerin (NITROSTAT) 0.4 mg SL tablet   No No   Si Tab by SubLINGual route every five (5) minutes as needed for Chest Pain (call 911 if not relieved by 3). nystatin (MYCOSTATIN) topical cream   Yes Yes   Sig: Apply  to affected area two (2) times daily as needed for Skin Irritation or Itching. omeprazole (PRILOSEC) 40 mg capsule   Yes No   Sig: Take 40 mg by mouth daily.       Facility-Administered Medications: None          Thank you,  [facility] [employer]

## 2018-01-23 LAB
ALBUMIN SERPL-MCNC: 3 G/DL (ref 3.5–5)
ALBUMIN/GLOB SERPL: 0.6 {RATIO} (ref 1.1–2.2)
ALP SERPL-CCNC: 95 U/L (ref 45–117)
ALT SERPL-CCNC: 15 U/L (ref 12–78)
ANION GAP SERPL CALC-SCNC: 6 MMOL/L (ref 5–15)
AST SERPL-CCNC: 10 U/L (ref 15–37)
BASOPHILS # BLD: 0 K/UL (ref 0–0.1)
BASOPHILS NFR BLD: 0 % (ref 0–1)
BILIRUB SERPL-MCNC: 0.4 MG/DL (ref 0.2–1)
BUN SERPL-MCNC: 23 MG/DL (ref 6–20)
BUN/CREAT SERPL: 26 (ref 12–20)
CALCIUM SERPL-MCNC: 11.1 MG/DL (ref 8.5–10.1)
CHLORIDE SERPL-SCNC: 100 MMOL/L (ref 97–108)
CO2 SERPL-SCNC: 31 MMOL/L (ref 21–32)
CREAT SERPL-MCNC: 0.88 MG/DL (ref 0.55–1.02)
DIFFERENTIAL METHOD BLD: ABNORMAL
EOSINOPHIL # BLD: 0 K/UL (ref 0–0.4)
EOSINOPHIL NFR BLD: 0 % (ref 0–7)
ERYTHROCYTE [DISTWIDTH] IN BLOOD BY AUTOMATED COUNT: 13.8 % (ref 11.5–14.5)
GLOBULIN SER CALC-MCNC: 5.1 G/DL (ref 2–4)
GLUCOSE BLD STRIP.AUTO-MCNC: 143 MG/DL (ref 65–100)
GLUCOSE BLD STRIP.AUTO-MCNC: 224 MG/DL (ref 65–100)
GLUCOSE BLD STRIP.AUTO-MCNC: 235 MG/DL (ref 65–100)
GLUCOSE BLD STRIP.AUTO-MCNC: 249 MG/DL (ref 65–100)
GLUCOSE SERPL-MCNC: 210 MG/DL (ref 65–100)
HCT VFR BLD AUTO: 43.3 % (ref 35–47)
HGB BLD-MCNC: 13.5 G/DL (ref 11.5–16)
LYMPHOCYTES # BLD: 0.5 K/UL (ref 0.8–3.5)
LYMPHOCYTES NFR BLD: 3 % (ref 12–49)
MCH RBC QN AUTO: 28.8 PG (ref 26–34)
MCHC RBC AUTO-ENTMCNC: 31.2 G/DL (ref 30–36.5)
MCV RBC AUTO: 92.3 FL (ref 80–99)
MONOCYTES # BLD: 0.3 K/UL (ref 0–1)
MONOCYTES NFR BLD: 2 % (ref 5–13)
NEUTS SEG # BLD: 16 K/UL (ref 1.8–8)
NEUTS SEG NFR BLD: 95 % (ref 32–75)
PLATELET # BLD AUTO: 243 K/UL (ref 150–400)
PLATELET COMMENTS,PCOM: ABNORMAL
POTASSIUM SERPL-SCNC: 4.8 MMOL/L (ref 3.5–5.1)
PROT SERPL-MCNC: 8.1 G/DL (ref 6.4–8.2)
RBC # BLD AUTO: 4.69 M/UL (ref 3.8–5.2)
RBC MORPH BLD: ABNORMAL
SERVICE CMNT-IMP: ABNORMAL
SODIUM SERPL-SCNC: 137 MMOL/L (ref 136–145)
WBC # BLD AUTO: 16.8 K/UL (ref 3.6–11)

## 2018-01-23 PROCEDURE — 74011250637 HC RX REV CODE- 250/637: Performed by: INTERNAL MEDICINE

## 2018-01-23 PROCEDURE — 94660 CPAP INITIATION&MGMT: CPT

## 2018-01-23 PROCEDURE — 74011636637 HC RX REV CODE- 636/637: Performed by: INTERNAL MEDICINE

## 2018-01-23 PROCEDURE — 80053 COMPREHEN METABOLIC PANEL: CPT | Performed by: INTERNAL MEDICINE

## 2018-01-23 PROCEDURE — 74011250636 HC RX REV CODE- 250/636: Performed by: INTERNAL MEDICINE

## 2018-01-23 PROCEDURE — 36415 COLL VENOUS BLD VENIPUNCTURE: CPT | Performed by: INTERNAL MEDICINE

## 2018-01-23 PROCEDURE — 82962 GLUCOSE BLOOD TEST: CPT

## 2018-01-23 PROCEDURE — 65270000029 HC RM PRIVATE

## 2018-01-23 PROCEDURE — 94640 AIRWAY INHALATION TREATMENT: CPT

## 2018-01-23 PROCEDURE — 74011000250 HC RX REV CODE- 250: Performed by: INTERNAL MEDICINE

## 2018-01-23 PROCEDURE — 85025 COMPLETE CBC W/AUTO DIFF WBC: CPT | Performed by: INTERNAL MEDICINE

## 2018-01-23 RX ADMIN — METOPROLOL TARTRATE 12.5 MG: 25 TABLET ORAL at 18:37

## 2018-01-23 RX ADMIN — INSULIN LISPRO 2 UNITS: 100 INJECTION, SOLUTION INTRAVENOUS; SUBCUTANEOUS at 17:14

## 2018-01-23 RX ADMIN — METHYLPREDNISOLONE SODIUM SUCCINATE 20 MG: 40 INJECTION, POWDER, FOR SOLUTION INTRAMUSCULAR; INTRAVENOUS at 00:15

## 2018-01-23 RX ADMIN — INSULIN LISPRO 2 UNITS: 100 INJECTION, SOLUTION INTRAVENOUS; SUBCUTANEOUS at 21:02

## 2018-01-23 RX ADMIN — INSULIN LISPRO 3 UNITS: 100 INJECTION, SOLUTION INTRAVENOUS; SUBCUTANEOUS at 08:30

## 2018-01-23 RX ADMIN — Medication: at 08:32

## 2018-01-23 RX ADMIN — FLUTICASONE PROPIONATE 2 SPRAY: 50 SPRAY, METERED NASAL at 08:34

## 2018-01-23 RX ADMIN — ASPIRIN 81 MG: 81 TABLET, COATED ORAL at 08:30

## 2018-01-23 RX ADMIN — ALBUTEROL SULFATE 2.5 MG: 2.5 SOLUTION RESPIRATORY (INHALATION) at 17:10

## 2018-01-23 RX ADMIN — Medication 10 ML: at 04:59

## 2018-01-23 RX ADMIN — Medication 10 ML: at 21:02

## 2018-01-23 RX ADMIN — SODIUM HYPOCHLORITE: 1.25 SOLUTION TOPICAL at 18:38

## 2018-01-23 RX ADMIN — PANTOPRAZOLE SODIUM 40 MG: 40 TABLET, DELAYED RELEASE ORAL at 08:01

## 2018-01-23 RX ADMIN — ENOXAPARIN SODIUM 40 MG: 40 INJECTION SUBCUTANEOUS at 21:03

## 2018-01-23 RX ADMIN — Medication: at 18:38

## 2018-01-23 RX ADMIN — METOPROLOL TARTRATE 12.5 MG: 25 TABLET ORAL at 08:30

## 2018-01-23 RX ADMIN — KETOROLAC TROMETHAMINE 30 MG: 30 INJECTION, SOLUTION INTRAMUSCULAR at 08:00

## 2018-01-23 RX ADMIN — ENOXAPARIN SODIUM 40 MG: 40 INJECTION SUBCUTANEOUS at 08:30

## 2018-01-23 RX ADMIN — NYSTATIN: 100000 POWDER TOPICAL at 08:33

## 2018-01-23 RX ADMIN — SILVER SULFADIAZINE: 10 CREAM TOPICAL at 04:54

## 2018-01-23 RX ADMIN — SODIUM HYPOCHLORITE: 1.25 SOLUTION TOPICAL at 04:52

## 2018-01-23 RX ADMIN — CYCLOBENZAPRINE HYDROCHLORIDE 5 MG: 10 TABLET, FILM COATED ORAL at 21:19

## 2018-01-23 RX ADMIN — PRAVASTATIN SODIUM 40 MG: 40 TABLET ORAL at 21:03

## 2018-01-23 RX ADMIN — SODIUM HYPOCHLORITE: 1.25 SOLUTION TOPICAL at 04:55

## 2018-01-23 RX ADMIN — FUROSEMIDE 40 MG: 10 INJECTION, SOLUTION INTRAMUSCULAR; INTRAVENOUS at 08:01

## 2018-01-23 RX ADMIN — Medication: at 17:13

## 2018-01-23 RX ADMIN — GLYBURIDE 5 MG: 5 TABLET ORAL at 08:01

## 2018-01-23 RX ADMIN — NYSTATIN: 100000 POWDER TOPICAL at 18:38

## 2018-01-23 RX ADMIN — METHYLPREDNISOLONE SODIUM SUCCINATE 20 MG: 40 INJECTION, POWDER, FOR SOLUTION INTRAMUSCULAR; INTRAVENOUS at 17:12

## 2018-01-23 RX ADMIN — METHYLPREDNISOLONE SODIUM SUCCINATE 20 MG: 40 INJECTION, POWDER, FOR SOLUTION INTRAMUSCULAR; INTRAVENOUS at 08:00

## 2018-01-23 RX ADMIN — INSULIN LISPRO 3 UNITS: 100 INJECTION, SOLUTION INTRAVENOUS; SUBCUTANEOUS at 13:17

## 2018-01-23 RX ADMIN — METHYLPREDNISOLONE SODIUM SUCCINATE 20 MG: 40 INJECTION, POWDER, FOR SOLUTION INTRAMUSCULAR; INTRAVENOUS at 23:11

## 2018-01-23 RX ADMIN — Medication 10 ML: at 17:13

## 2018-01-23 RX ADMIN — FUROSEMIDE 40 MG: 10 INJECTION, SOLUTION INTRAMUSCULAR; INTRAVENOUS at 17:12

## 2018-01-23 RX ADMIN — LISINOPRIL 10 MG: 5 TABLET ORAL at 08:30

## 2018-01-23 NOTE — PROGRESS NOTES
Hospitalist Progress Note    NAME: Aristides Obrien   :  1970   MRN:  [id number]       Assessment / Plan:  Chest pain in setting of chronic hypoxic respiratory failure, obesity hypoventilation and GEN/noncompliance with CPAP:  Baseline O2 5 LPM  cardiology Inputs and recommendations Appreciated. troponins negative so far, no new EKG changes   ECHO. Ejection fraction wasestimated to be 55 %. Continue  diuresis with IV lasix 40 mg IV Q 12 hrs.  con't mevacor, lisinopril, metoprolol. Con't ASA. prn nitrobid  CPAP at night   Patient said she has episodes of SOB and desaturation to 84 % overnight  pulmnary consult   Will follow up consult with CM for possible pulmonary rehab placement on Discharge. Noninsulin dependent DM2 controlled without complication:  con't home glyburide  prn lispro sliding scale    Benign HTN:  con't lisinopril, metoprolol  prn nitrbid    Lymphedema with chronic LLE wound:    Wound care Inputs and recommendations Appreciated. Irish Kennedy COPD ,stable   Continue home breathing treatments   duoneb neb prn. Morbid obesity  Advised on diet . Body mass index is 63.29 kg/(m^2). Code status: Full  Prophylaxis: Lovenox  Recommended Disposition: SAH/Rehab     Subjective:     Chief Complaint / Reason for Physician Visit  I had episodes of sob overnight . Discussed with RN events overnight. Review of Systems:  Symptom Y/N Comments  Symptom Y/N Comments   Fever/Chills n   Chest Pain n I had it earlier    Poor Appetite n   Edema     Cough y   Abdominal Pain     Sputum    Joint Pain n    SOB/RAZO y   Pruritis/Rash     Nausea/vomit n   Tolerating PT/OT     Diarrhea n   Tolerating Diet     Constipation n   Other       Could NOT obtain due to:      Objective:     VITALS:   Last 24hrs VS reviewed since prior progress note.  Most recent are:  Patient Vitals for the past 24 hrs:   Temp Pulse Resp BP SpO2   18 0802 97.9 °F (36.6 °C) (!) 104 20 133/83 (!) 85 %   18 0451 98.2 °F (36.8 °C) (!) 105 18 146/70 90 %   01/22/18 2326 98.6 °F (37 °C) (!) 101 20 112/60 90 %   01/22/18 2001 98.7 °F (37.1 °C) (!) 108 20 125/72 90 %   01/22/18 1740 - 86 - 120/66 -   01/22/18 1614 - - - - 93 %   01/22/18 1557 98.6 °F (37 °C) 82 20 136/69 98 %   01/22/18 1542 98.5 °F (36.9 °C) 93 20 107/62 91 %   01/22/18 1539 - - - - 91 %   01/22/18 1119 97.5 °F (36.4 °C) (!) 103 20 113/72 90 %       Intake/Output Summary (Last 24 hours) at 01/23/18 0852  Last data filed at 01/22/18 2218   Gross per 24 hour   Intake                0 ml   Output             1600 ml   Net            -1600 ml        PHYSICAL EXAM:  General: WD, WN. Alert, cooperative, no acute distress   morbidly obese. EENT:  EOMI. Anicteric sclerae. MMM  Resp:  Decrease air entry in the lower lung fields, no crackles BL. No wheezing  CV:  Regular  rhythm,  No edema  GI:  Soft, Non distended, Non tender.  +Bowel sounds  Neurologic:  Alert and oriented X 3, normal speech,   Psych:   Good insight. Not anxious nor agitated  Skin:  No rashes. No jaundice  Chronic LE swelling , chronic skin changes,fingaiting horny thick skin on both LE and open wound on the left lower leg ,rapped up. Reviewed most current lab test results and cultures  YES  Reviewed most current radiology test results   YES  Review and summation of old records today    NO  Reviewed patient's current orders and MAR    YES  PMH/SH reviewed - no change compared to H&P  ________________________________________________________________________  Care Plan discussed with:    Comments   Patient y    Family      RN y    Care Manager     Consultant                        Multidiciplinary team rounds were held today with , nursing, pharmacist and clinical coordinator. Patient's plan of care was discussed; medications were reviewed and discharge planning was addressed.      ________________________________________________________________________  Total NON critical care TIME:  45 Minutes    Total CRITICAL CARE TIME Spent:   Minutes non procedure based      Comments   >50% of visit spent in counseling and coordination of care     ________________________________________________________________________  Ori Johnson MD     Procedures: see electronic medical records for all procedures/Xrays and details which were not copied into this note but were reviewed prior to creation of Plan. LABS:  I reviewed today's most current labs and imaging studies. Pertinent labs include:  Recent Labs      01/23/18   0436  01/22/18   0355  01/20/18   2306   WBC  16.8*  10.1  11.9*   HGB  13.5  12.9  13.0   HCT  43.3  42.7  43.7   PLT  243  211  219     Recent Labs      01/23/18   0436  01/22/18   0355  01/20/18   2306   NA  137  137  140   K  4.8  4.2  4.6   CL  100  101  103   CO2  31  33*  33*   GLU  210*  110*  107*   BUN  23*  21*  18   CREA  0.88  0.91  0.94   CA  11.1*  10.6*  10.9*   ALB  3.0*   --   3.1*   TBILI  0.4   --   0.3   SGOT  10*   --   15   ALT  15   --   14       Signed:  Ori Johnson MD

## 2018-01-23 NOTE — PROGRESS NOTES
[facility] now has the home health orders for PT/OT/NSG. [facility] advised they could order a rollator but not the nebulizer. Will discuss the nebulizer with patient.     [phone]

## 2018-01-23 NOTE — DIABETES MGMT
DTC Progress Note    Recommendations/ Comments: Please consider beginning NPH 5units Q8hrs timed and linked with solu-medrol. Chart reviewed on [facility]. Patient is a 41 y.o. female with known Type 2 Diabetes on glyburide 5mg ac b at home. A1c:   Lab Results   Component Value Date/Time    Hemoglobin A1c 5.3 07/25/2017 05:30 AM    Hemoglobin A1c 7.1 10/28/2016 04:25 AM       Recent Glucose Results:   Lab Results   Component Value Date/Time     (H) 01/23/2018 04:36 AM    GLUCPOC 224 (H) 01/23/2018 07:58 AM    GLUCPOC 286 (H) 01/22/2018 08:41 PM    GLUCPOC 154 (H) 01/22/2018 04:41 PM        Lab Results   Component Value Date/Time    Creatinine 0.88 01/23/2018 04:36 AM     Estimated Creatinine Clearance: 136 mL/min (based on Cr of 0.88). Active Orders   Diet    DIET DIABETIC CONSISTENT CARB Regular; 2 GM NA (House Low NA); FR 1500ML        PO intake: No data found. Current hospital DM medication: humalog correction and glyburide 5mg ac b    Will continue to follow as needed.     Thank you  Josse Carter, OMARIN, RN, [address]

## 2018-01-23 NOTE — CONSULTS
[facility]  Pulmonary, Critical Care, and Sleep Medicine    Initial Patient Consult    Name: Primo Peck MRN: [id number]   : 1970 Hospital: [facility]   Date: 2018        IMPRESSION:   · Chronic hypoxic/hypercapnic respiratory failure  · GEN/OHS - followed by Dr. Ayanna nicole, not compliant with her home Trilogy (currently attributes this to not liking her full face mask)  · COPD/asthma - no prior PFTs, no evidence of exacerbation with exacerbation   · CAD  · HTN  · DM  · Morbid obesity  · Tobacco use      RECOMMENDATIONS:   · She should be on her home Trilogy  · She needs to contact her sleep doctor (Dr. Joby Barker) about getting a new machine  · In the meantime, if she remains admitted, her settings should be AVAPS, , Pmax 50, PS max 20, PS min 10, EPAPmax 10, EPAP min 4, with 6 LPM O2 bled in (or 35% FiO2 on our machines)  · Recommended better compliance to reduce hospitalizations and help with edema  · Tobacco cessation counseling  · Little else to add at this time. Will be available PRN     Subjective: This patient has been seen and evaluated at the request of Dr. Tommy Osgood for \"? OHS and GEN and COPD , keeps disaturating and repaeted admissions\". Patient is a 41 y.o. female smoker with GEN/OHS, follows with Dr. Joby Barker of [place] [address]. She is noncompliant with her BiPAP. She was recently started on nocturnal noninvasive ventilation, but she has not been compliant because she does not like the mask she was given. She has been treated with CPAP of 6 cm H2O in the hospital and she has unsurprisingly desaturated while on those minimal settings. She carries a diagnosis of COPD/asthma and has followed with Dr. Gosia Riddle for years, but she has never had PFTs. No wheezing at this time. She tells me she hopes to go home today.      Past Medical History:   Diagnosis Date    Arthritis     Asthma     CAD (coronary artery disease)  Congestive heart failure (HCC)     [facility]     Diabetes ([facility]     Hypertension     Morbid obesity with BMI of 70 and over, adult ([facility]     NSTEMI (non-ST elevated myocardial infarction) ([facility]     SVT (supraventricular tachycardia) ([facility]       Past Surgical History:   Procedure Laterality Date    CARDIAC SURG PROCEDURE UNLIST      Stents    HX  SECTION      HX ORTHOPAEDIC      HX OTHER SURGICAL      cyst removed from back      Prior to Admission medications    Medication Sig Start Date End Date Taking? Authorizing Provider   nystatin (MYCOSTATIN) topical cream Apply  to affected area two (2) times daily as needed for Skin Irritation or Itching. Yes Historical Provider   cyclobenzaprine (FLEXERIL) 10 mg tablet Take 10 mg by mouth two (2) times daily as needed for Muscle Spasm(s). Indications: Muscle Spasm    Historical Provider   hydrOXYzine pamoate (VISTARIL) 50 mg capsule Take 50 mg by mouth every eight (8) hours as needed for Itching. Indications: Pruritus of Skin    Historical Provider   fluticasone (FLONASE) 50 mcg/actuation nasal spray 2 Sprays by Both Nostrils route daily. 17   Nesha Gallardo MD   albuterol (PROVENTIL VENTOLIN) 2.5 mg /3 mL (0.083 %) nebulizer solution 3 mL by Nebulization route every four (4) hours as needed for Wheezing. 17 MARI Garner MD   furosemide (LASIX) 40 mg tablet Take 40 mg by mouth daily. Landon Raza MD   lovastatin (MEVACOR) 40 mg tablet Take 1 Tab by mouth nightly. 16   Wali Marx NP   lisinopril (PRINIVIL, ZESTRIL) 40 mg tablet Take 40 mg by mouth daily. Historical Provider   glyBURIDE (DIABETA) 5 mg tablet Take 5 mg by mouth Daily (before breakfast). Historical Provider   cetirizine (ZYRTEC) 10 mg tablet Take 10 mg by mouth daily as needed for Allergies. Historical Provider   omeprazole (PRILOSEC) 40 mg capsule Take 40 mg by mouth daily.  7/18/15   Landon Raza MD ketoconazole (NIZORAL) 2 % topical cream Apply  to affected area daily. Historical Provider   ammonium lactate (LAC-HYDRIN) 12 % topical cream rub in to affected area well 11/21/14   Ashley Garcia MD   aspirin delayed-release 81 mg tablet Take 81 mg by mouth daily. Historical Provider   metoprolol (LOPRESSOR) 25 mg tablet Take 25 mg by mouth two (2) times a day. Historical Provider   nitroglycerin (NITROSTAT) 0.4 mg SL tablet 1 Tab by SubLINGual route every five (5) minutes as needed for Chest Pain (call 911 if not relieved by 3). 9/12/13   Chikis Finney NP   albuterol (PROVENTIL, VENTOLIN) 90 mcg/actuation inhaler Take 1-2 Puffs by inhalation every four (4) hours as needed for Wheezing.  9/10/13   Angelica Nath MD     No Known Allergies   Social History   Substance Use Topics    Smoking status: Current Every Day Smoker     Packs/day: 0.25     Types: Cigarettes    Smokeless tobacco: Never Used    Alcohol use No      Family History   Problem Relation Age of Onset    Heart Disease Mother     Heart Disease Father     Heart Disease Sister         Current Facility-Administered Medications   Medication Dose Route Frequency    nystatin (MYCOSTATIN) 100,000 unit/gram powder   Topical BID    methylPREDNISolone (PF) (SOLU-MEDROL) injection 20 mg  20 mg IntraVENous Q8H    sodium chloride (NS) 0.9 % flush        sodium hypochlorite (QUARTER STRENGTH DAKIN'S) 0.125% irrigation (bottle)   Topical BID    ammonium lactate (LAC-HYDRIN) 12 % lotion   Topical BID    silver sulfADIAZINE (SILVADENE) 1 % topical cream   Topical DAILY    aspirin delayed-release tablet 81 mg  81 mg Oral DAILY    fluticasone (FLONASE) 50 mcg/actuation nasal spray 2 Spray  2 Spray Both Nostrils DAILY    glyBURIDE (DIABETA) tablet 5 mg  5 mg Oral ACB    pravastatin (PRAVACHOL) tablet 40 mg  40 mg Oral QHS    pantoprazole (PROTONIX) tablet 40 mg  40 mg Oral ACB    sodium chloride (NS) flush 5-10 mL  5-10 mL IntraVENous Q8H    enoxaparin (LOVENOX) injection 40 mg  40 mg SubCUTAneous Q12H    insulin lispro (HUMALOG) injection   SubCUTAneous AC&HS    metoprolol tartrate (LOPRESSOR) tablet 12.5 mg  12.5 mg Oral BID    furosemide (LASIX) injection 40 mg  40 mg IntraVENous ACB&D    lisinopril (PRINIVIL, ZESTRIL) tablet 10 mg  10 mg Oral DAILY       Review of Systems:  A comprehensive review of systems was negative except for that written in the HPI. Objective:   Vital Signs:    Visit Vitals    /81 (BP 1 Location: Left arm, BP Patient Position: At rest)    Pulse 89    Temp 98.1 °F (36.7 °C)    Resp 20    Ht 5' 6\" (1.676 m)    Wt (!) 177.9 kg (392 lb 2 oz)    SpO2 91%    Breastfeeding No    BMI 63.29 kg/m2       O2 Device: Nasal cannula   O2 Flow Rate (L/min): 5 l/min   Temp (24hrs), Av.4 °F (36.9 °C), Min:97.9 °F (36.6 °C), Max:98.7 °F (37.1 °C)       Intake/Output:   Last shift:         Last 3 shifts:  1901 -  0700  In: -   Out: 1600 [Urine:1600]    Intake/Output Summary (Last 24 hours) at 18 1159  Last data filed at 18 2218   Gross per 24 hour   Intake                0 ml   Output             1600 ml   Net            -1600 ml      Physical Exam:   General:  Alert, cooperative, no distress, morbidly obese   Head:  Normocephalic, without obvious abnormality, atraumatic. Eyes:  Conjunctivae/corneas clear. PERRL, EOMs intact. Nose: Nares normal. Septum midline. Mucosa normal.     Throat: Lips, mucosa, and tongue normal.     Neck: Supple, symmetrical, trachea midline    Back:   Symmetric, no curvature. ROM normal.   Lungs:   Clear to auscultation bilaterally without wheeze or rales   Chest wall:  No tenderness or deformity. Heart:  Regular rate and rhythm, distant heart tones   Abdomen:   Soft, non-tender.  Bowel sounds normal.    Extremities: Extremities normal, atraumatic, no cyanosis, +chronic massive edema   Skin: Skin color, texture, turgor normal. No rashes or lesions   Lymph nodes: Cervical, supraclavicular nodes normal.   Neurologic: Grossly nonfocal     Data review:     Recent Results (from the past 24 hour(s))   GLUCOSE, POC    Collection Time: 01/22/18  4:41 PM   Result Value Ref Range    Glucose (POC) 154 (H) 65 - 100 mg/dL    Performed by Toney Lomax, POC    Collection Time: 01/22/18  8:41 PM   Result Value Ref Range    Glucose (POC) 286 (H) 65 - 100 mg/dL    Performed by Connor Rolon (PCT)    CBC WITH AUTOMATED DIFF    Collection Time: 01/23/18  4:36 AM   Result Value Ref Range    WBC 16.8 (H) 3.6 - 11.0 K/uL    RBC 4.69 3.80 - 5.20 M/uL    HGB 13.5 11.5 - 16.0 g/dL    HCT 43.3 35.0 - 47.0 %    MCV 92.3 80.0 - 99.0 FL    MCH 28.8 26.0 - 34.0 PG    MCHC 31.2 30.0 - 36.5 g/dL    RDW 13.8 11.5 - 14.5 %    PLATELET 414 087 - 530 K/uL    NEUTROPHILS 95 (H) 32 - 75 %    LYMPHOCYTES 3 (L) 12 - 49 %    MONOCYTES 2 (L) 5 - 13 %    EOSINOPHILS 0 0 - 7 %    BASOPHILS 0 0 - 1 %    ABS. NEUTROPHILS 16.0 (H) 1.8 - 8.0 K/UL    ABS. LYMPHOCYTES 0.5 (L) 0.8 - 3.5 K/UL    ABS. MONOCYTES 0.3 0.0 - 1.0 K/UL    ABS. EOSINOPHILS 0.0 0.0 - 0.4 K/UL    ABS. BASOPHILS 0.0 0.0 - 0.1 K/UL    PLATELET COMMENTS LARGE PLATELETS      RBC COMMENTS NORMOCYTIC, NORMOCHROMIC      DF SMEAR SCANNED     METABOLIC PANEL, COMPREHENSIVE    Collection Time: 01/23/18  4:36 AM   Result Value Ref Range    Sodium 137 136 - 145 mmol/L    Potassium 4.8 3.5 - 5.1 mmol/L    Chloride 100 97 - 108 mmol/L    CO2 31 21 - 32 mmol/L    Anion gap 6 5 - 15 mmol/L    Glucose 210 (H) 65 - 100 mg/dL    BUN 23 (H) 6 - 20 MG/DL    Creatinine 0.88 0.55 - 1.02 MG/DL    BUN/Creatinine ratio 26 (H) 12 - 20      GFR est AA >60 >60 ml/min/1.73m2    GFR est non-AA >60 >60 ml/min/1.73m2    Calcium 11.1 (H) 8.5 - 10.1 MG/DL    Bilirubin, total 0.4 0.2 - 1.0 MG/DL    ALT (SGPT) 15 12 - 78 U/L    AST (SGOT) 10 (L) 15 - 37 U/L    Alk.  phosphatase 95 45 - 117 U/L    Protein, total 8.1 6.4 - 8.2 g/dL    Albumin 3.0 (L) 3.5 - 5.0 g/dL    Globulin 5.1 (H) 2.0 - 4.0 g/dL    A-G Ratio 0.6 (L) 1.1 - 2.2     GLUCOSE, POC    Collection Time: 01/23/18  7:58 AM   Result Value Ref Range    Glucose (POC) 224 (H) 65 - 100 mg/dL    Performed by Leisa Live, POC    Collection Time: 01/23/18 11:28 AM   Result Value Ref Range    Glucose (POC) 235 (H) 65 - 100 mg/dL    Performed by Marisol Pennington        Imaging:  I have personally reviewed the patients radiographs and have reviewed the reports:  No change from prior films        Lan Lemus MD

## 2018-01-23 NOTE — PROGRESS NOTES
Cardiopulmonary Care Interdisciplinary rounds were held today to discuss patient plan of care and outcomes. The following members were present: NP/Physician, PT, Pharmacy, Nursing, Nutritionist and Case Management.     Expected Length of Stay:  1d 19h  Geometric Length of Stay: DRG GLOS: 1.8    Plan of Care: Continue current treatment plan  Pulmonology consult; pulmonary rehab at d/c

## 2018-01-24 VITALS
OXYGEN SATURATION: 97 % | HEART RATE: 85 BPM | WEIGHT: 293 LBS | TEMPERATURE: 97.9 F | BODY MASS INDEX: 47.09 KG/M2 | SYSTOLIC BLOOD PRESSURE: 108 MMHG | DIASTOLIC BLOOD PRESSURE: 68 MMHG | RESPIRATION RATE: 20 BRPM | HEIGHT: 66 IN

## 2018-01-24 LAB
GLUCOSE BLD STRIP.AUTO-MCNC: 189 MG/DL (ref 65–100)
GLUCOSE BLD STRIP.AUTO-MCNC: 330 MG/DL (ref 65–100)
SERVICE CMNT-IMP: ABNORMAL
SERVICE CMNT-IMP: ABNORMAL

## 2018-01-24 PROCEDURE — 74011250637 HC RX REV CODE- 250/637: Performed by: INTERNAL MEDICINE

## 2018-01-24 PROCEDURE — 77010033678 HC OXYGEN DAILY

## 2018-01-24 PROCEDURE — 74011250636 HC RX REV CODE- 250/636: Performed by: INTERNAL MEDICINE

## 2018-01-24 PROCEDURE — 74011636637 HC RX REV CODE- 636/637: Performed by: INTERNAL MEDICINE

## 2018-01-24 PROCEDURE — 82962 GLUCOSE BLOOD TEST: CPT

## 2018-01-24 RX ORDER — NYSTATIN 100000 [USP'U]/G
POWDER TOPICAL 2 TIMES DAILY
Qty: 1 BOTTLE | Refills: 0 | Status: SHIPPED | OUTPATIENT
Start: 2018-01-24 | End: 2018-01-24

## 2018-01-24 RX ORDER — NEBULIZER AND COMPRESSOR
1 EACH MISCELLANEOUS
Qty: 1 EACH | Refills: 0 | Status: ON HOLD | OUTPATIENT
Start: 2018-01-24 | End: 2018-07-10

## 2018-01-24 RX ORDER — TRAMADOL HYDROCHLORIDE 50 MG/1
50 TABLET ORAL
Qty: 15 TAB | Refills: 0 | Status: ON HOLD | OUTPATIENT
Start: 2018-01-24 | End: 2018-07-09

## 2018-01-24 RX ORDER — BENZONATATE 100 MG/1
100 CAPSULE ORAL
Qty: 30 CAP | Refills: 1 | Status: ON HOLD | OUTPATIENT
Start: 2018-01-24 | End: 2018-01-30

## 2018-01-24 RX ORDER — NYSTATIN 100000 U/G
CREAM TOPICAL 2 TIMES DAILY
Qty: 15 G | Refills: 0 | Status: SHIPPED | OUTPATIENT
Start: 2018-01-24 | End: 2018-02-03

## 2018-01-24 RX ADMIN — GLYBURIDE 5 MG: 5 TABLET ORAL at 09:55

## 2018-01-24 RX ADMIN — SODIUM HYPOCHLORITE: 1.25 SOLUTION TOPICAL at 04:47

## 2018-01-24 RX ADMIN — SILVER SULFADIAZINE: 10 CREAM TOPICAL at 04:47

## 2018-01-24 RX ADMIN — NYSTATIN: 100000 POWDER TOPICAL at 10:01

## 2018-01-24 RX ADMIN — FLUTICASONE PROPIONATE 2 SPRAY: 50 SPRAY, METERED NASAL at 10:00

## 2018-01-24 RX ADMIN — TRAMADOL HYDROCHLORIDE 50 MG: 50 TABLET, FILM COATED ORAL at 02:43

## 2018-01-24 RX ADMIN — Medication: at 10:01

## 2018-01-24 RX ADMIN — METOPROLOL TARTRATE 12.5 MG: 25 TABLET ORAL at 09:55

## 2018-01-24 RX ADMIN — LISINOPRIL 10 MG: 5 TABLET ORAL at 09:55

## 2018-01-24 RX ADMIN — FUROSEMIDE 40 MG: 10 INJECTION, SOLUTION INTRAMUSCULAR; INTRAVENOUS at 09:55

## 2018-01-24 RX ADMIN — METHYLPREDNISOLONE SODIUM SUCCINATE 20 MG: 40 INJECTION, POWDER, FOR SOLUTION INTRAMUSCULAR; INTRAVENOUS at 10:09

## 2018-01-24 RX ADMIN — PANTOPRAZOLE SODIUM 40 MG: 40 TABLET, DELAYED RELEASE ORAL at 09:55

## 2018-01-24 RX ADMIN — ENOXAPARIN SODIUM 40 MG: 40 INJECTION SUBCUTANEOUS at 09:56

## 2018-01-24 RX ADMIN — INSULIN LISPRO 2 UNITS: 100 INJECTION, SOLUTION INTRAVENOUS; SUBCUTANEOUS at 09:56

## 2018-01-24 RX ADMIN — ASPIRIN 81 MG: 81 TABLET, COATED ORAL at 09:55

## 2018-01-24 RX ADMIN — INSULIN LISPRO 7 UNITS: 100 INJECTION, SOLUTION INTRAVENOUS; SUBCUTANEOUS at 12:10

## 2018-01-24 RX ADMIN — Medication 10 ML: at 04:48

## 2018-01-24 NOTE — PROGRESS NOTES
Pharmacist Discharge Medication Reconciliation    Significant PMH:   Past Medical History:   Diagnosis Date    Arthritis     Asthma     CAD (coronary artery disease)     [facility] ([facility]     COPD (chronic obstructive pulmonary disease) ([facility]     Diabetes ([place] [address].)     Hypertension     Morbid obesity with BMI of 70 and over, adult ([place] [address].)     NSTEMI (non-ST elevated myocardial infarction) ([facility]     SVT (supraventricular tachycardia) ([place] [address].)      Chief Complaint for this Admission:   Chief Complaint   Patient presents with    Chest Pain     Allergies: Review of patient's allergies indicates no known allergies. Discharge Medications:   Current Discharge Medication List        START taking these medications    Details   nystatin (MYCOSTATIN) powder Apply  to affected area two (2) times a day. Qty: 1 Bottle, Refills: 0      traMADol (ULTRAM) 50 mg tablet Take 1 Tab by mouth every eight (8) hours as needed. Max Daily Amount: 150 mg.  Qty: 15 Tab, Refills: 0    Associated Diagnoses: Other chest pain      Nebulizer & Compressor machine 1 Each by Does Not Apply route daily as needed. Qty: 1 Each, Refills: 0           CONTINUE these medications which have CHANGED    Details   benzonatate (TESSALON) 100 mg capsule Take 1 Cap by mouth three (3) times daily as needed for Cough for up to 7 days. Qty: 30 Cap, Refills: 1           CONTINUE these medications which have NOT CHANGED    Details   nystatin (MYCOSTATIN) topical cream Apply  to affected area two (2) times daily as needed for Skin Irritation or Itching. cyclobenzaprine (FLEXERIL) 10 mg tablet Take 10 mg by mouth two (2) times daily as needed for Muscle Spasm(s). Indications: Muscle Spasm      hydrOXYzine pamoate (VISTARIL) 50 mg capsule Take 50 mg by mouth every eight (8) hours as needed for Itching. Indications: Pruritus of Skin      fluticasone (FLONASE) 50 mcg/actuation nasal spray 2 Sprays by Both Nostrils route daily.   Qty: 1 Bottle, Refills: 0      albuterol (PROVENTIL VENTOLIN) 2.5 mg /3 mL (0.083 %) nebulizer solution 3 mL by Nebulization route every four (4) hours as needed for Wheezing. Qty: 24 Each, Refills: 0      furosemide (LASIX) 40 mg tablet Take 40 mg by mouth daily. lovastatin (MEVACOR) 40 mg tablet Take 1 Tab by mouth nightly. Qty: 30 Tab, Refills: 6    Associated Diagnoses: Atherosclerosis of native coronary artery without angina pectoris      lisinopril (PRINIVIL, ZESTRIL) 40 mg tablet Take 40 mg by mouth daily. glyBURIDE (DIABETA) 5 mg tablet Take 5 mg by mouth Daily (before breakfast). cetirizine (ZYRTEC) 10 mg tablet Take 10 mg by mouth daily as needed for Allergies. omeprazole (PRILOSEC) 40 mg capsule Take 40 mg by mouth daily. ketoconazole (NIZORAL) 2 % topical cream Apply  to affected area daily. ammonium lactate (LAC-HYDRIN) 12 % topical cream rub in to affected area well  Qty: 280 g, Refills: 1      aspirin delayed-release 81 mg tablet Take 81 mg by mouth daily. metoprolol (LOPRESSOR) 25 mg tablet Take 25 mg by mouth two (2) times a day. nitroglycerin (NITROSTAT) 0.4 mg SL tablet 1 Tab by SubLINGual route every five (5) minutes as needed for Chest Pain (call 911 if not relieved by 3).   Qty: 25 Tab, Refills: 2    Associated Diagnoses: SVT (supraventricular tachycardia) ([facility]); CHF (congestive heart failure) ([facility]           STOP taking these medications       albuterol (PROVENTIL, VENTOLIN) 90 mcg/actuation inhaler Comments:   Reason for Stopping:               The patient's chart, MAR and AVS were reviewed by Huseyin Dorsey, KENNETHD.    Discharging Provider: Dr. Shari Sterling       Thank MICHAEL Angela

## 2018-01-24 NOTE — DISCHARGE INSTRUCTIONS
Shortness of Breath: Care Instructions  Your Care Instructions  Shortness of breath has many causes. Sometimes conditions such as anxiety can lead to shortness of breath. Some people get mild shortness of breath when they exercise. Trouble breathing also can be a symptom of a serious problem, such as asthma, lung disease, emphysema, heart problems, and pneumonia. If your shortness of breath continues, you may need tests and treatment. Watch for any changes in your breathing and other symptoms. Follow-up care is a key part of your treatment and safety. Be sure to make and go to all appointments, and call your doctor if you are having problems. It's also a good idea to know your test results and keep a list of the medicines you take. How can you care for yourself at home? · Do not smoke or allow others to smoke around you. If you need help quitting, talk to your doctor about stop-smoking programs and medicines. These can increase your chances of quitting for good. · Get plenty of rest and sleep. · Take your medicines exactly as prescribed. Call your doctor if you think you are having a problem with your medicine. · Find healthy ways to deal with stress. ¨ Exercise daily. ¨ Get plenty of sleep. ¨ Eat regularly and well. When should you call for help? Call 911 anytime you think you may need emergency care. For example, call if:  ? · You have severe shortness of breath. ? · You have symptoms of a heart attack. These may include:  ¨ Chest pain or pressure, or a strange feeling in the chest.  ¨ Sweating. ¨ Shortness of breath. ¨ Nausea or vomiting. ¨ Pain, pressure, or a strange feeling in the back, neck, jaw, or upper belly or in one or both shoulders or arms. ¨ Lightheadedness or sudden weakness. ¨ A fast or irregular heartbeat. After you call 911, the  may tell you to chew 1 adult-strength or 2 to 4 low-dose aspirin. Wait for an ambulance. Do not try to drive yourself.    ?Call your doctor now or seek immediate medical care if:  ? · Your shortness of breath gets worse or you start to wheeze. Wheezing is a high-pitched sound when you breathe. ? · You wake up at night out of breath or have to prop your head up on several pillows to breathe. ? · You are short of breath after only light activity or while at rest.   ? Watch closely for changes in your health, and be sure to contact your doctor if:  ? · You do not get better over the next 1 to 2 days. Where can you learn more? Go to http://cassia-donte.info/. Enter S780 in the search box to learn more about \"Shortness of Breath: Care Instructions. \"  Current as of: May 12, 2017  Content Version: 11.4  © 9478-1148 [employer]. Care instructions adapted under license by [employer] (which disclaims liability or warranty for this information). If you have questions about a medical condition or this instruction, always ask your healthcare professional. [place] [address] any warranty or liability for your use of this information. Low Sodium Diet (2,000 Milligram): Care Instructions  Your Care Instructions    Too much sodium causes your body to hold on to extra water. This can raise your blood pressure and force your heart and kidneys to work harder. In very serious cases, this could cause you to be put in the hospital. It might even be life-threatening. By limiting sodium, you will feel better and lower your risk of serious problems. The most common source of sodium is salt. People get most of the salt in their diet from canned, prepared, and packaged foods. Fast food and restaurant meals also are very high in sodium. Your doctor will probably limit your sodium to less than 2,000 milligrams (mg) a day. This limit counts all the sodium in prepared and packaged foods and any salt you add to your food. Follow-up care is a key part of your treatment and safety.  Be sure to make and go to all appointments, and call your doctor if you are having problems. It's also a good idea to know your test results and keep a list of the medicines you take. How can you care for yourself at home? Read food labels  · Read labels on cans and food packages. The labels tell you how much sodium is in each serving. Make sure that you look at the serving size. If you eat more than the serving size, you have eaten more sodium. · Food labels also tell you the Percent Daily Value for sodium. Choose products with low Percent Daily Values for sodium. · Be aware that sodium can come in forms other than salt, including monosodium glutamate (MSG), sodium citrate, and sodium bicarbonate (baking soda). MSG is often added to Asian food. When you eat out, you can sometimes ask for food without MSG or added salt. Buy low-sodium foods  · Buy foods that are labeled \"unsalted\" (no salt added), \"sodium-free\" (less than 5 mg of sodium per serving), or \"low-sodium\" (less than 140 mg of sodium per serving). Foods labeled \"reduced-sodium\" and \"light sodium\" may still have too much sodium. Be sure to read the label to see how much sodium you are getting. · Buy fresh vegetables, or frozen vegetables without added sauces. Buy low-sodium versions of canned vegetables, soups, and other canned goods. Prepare low-sodium meals  · Cut back on the amount of salt you use in cooking. This will help you adjust to the taste. Do not add salt after cooking. One teaspoon of salt has about 2,300 mg of sodium. · Take the salt shaker off the table. · Flavor your food with garlic, lemon juice, onion, vinegar, herbs, and spices. Do not use soy sauce, lite soy sauce, steak sauce, onion salt, garlic salt, celery salt, mustard, or ketchup on your food. · Use low-sodium salad dressings, sauces, and ketchup. Or make your own salad dressings and sauces without adding salt. · Use less salt (or none) when recipes call for it.  You can often use half the salt a recipe calls for without losing flavor. Other foods such as rice, pasta, and grains do not need added salt. · Rinse canned vegetables, and cook them in fresh water. This removes some-but not all-of the salt. · Avoid water that is naturally high in sodium or that has been treated with water softeners, which add sodium. Call your local water company to find out the sodium content of your water supply. If you buy bottled water, read the label and choose a sodium-free brand. Avoid high-sodium foods  · Avoid eating:  ¨ Smoked, cured, salted, and canned meat, fish, and poultry. ¨ Ham, mejia, hot dogs, and luncheon meats. ¨ Regular, hard, and processed cheese and regular peanut butter. ¨ Crackers with salted tops, and other salted snack foods such as pretzels, chips, and salted popcorn. ¨ Frozen prepared meals, unless labeled low-sodium. ¨ Canned and dried soups, broths, and bouillon, unless labeled sodium-free or low-sodium. ¨ Canned vegetables, unless labeled sodium-free or low-sodium. ¨ Western [place]ena fries, pizza, tacos, and other fast foods. ¨ Pickles, olives, ketchup, and other condiments, especially soy sauce, unless labeled sodium-free or low-sodium. Where can you learn more? Go to http://cassia-donte.info/. Enter F187 in the search box to learn more about \"Low Sodium Diet (2,000 Milligram): Care Instructions. \"  Current as of: May 12, 2017  Content Version: 11.4  © 1953-3901 [employer]. Care instructions adapted under license by [employer] (which disclaims liability or warranty for this information). If you have questions about a medical condition or this instruction, always ask your healthcare professional. [place] [address] any warranty or liability for your use of this information. Weigh Daily  Keep Log/Record    Notify Dr. Leyla Stone for a weight gain of 3 pounds or greater in one day or 5 pounds in one week.     Low Sodium Diet as per CHF Education    Smoking Cessation Program:   This is a free,  phone/text/email based, smoking cessation program. The program is individualized to meet each patient's needs. To enroll use this link [email or web address]://[email or web address]/[email or web address]

## 2018-01-24 NOTE — PROGRESS NOTES
DISCHARGE SUMMARY from Nurse    Patient stable for discharge. I have reviewed discharge instructions with the patient. The patient verbalized understanding. All questions fully answered. Prescriptions and medication handouts given to patient. Telemonitor and PIV removed. No personal belongings, home medications and valuables left at patients bedside//safe. patient verbalized no complaints.

## 2018-01-24 NOTE — PROGRESS NOTES
Patient has been cleared for discharge home. Patient declined pulmonary rehab. Have arranged home health PT/OT/NSG through [facility] per patient request. Have notified [facility] to follow for health coaching post discharge. [facility] will cover cost for a new nebulizer which has been delivered to patient's today prior to discharge home. [place] will deliver a new rollator to patient's home tomorrow and [facility] will cover the co-pay of $50 for patient did not have funds. Have spoken to PCP which is a visiting physician that patient is unsure if she has a trilogy or CPAP at home and that either way she has not been using the machine for face mask does not fit. Patient did not know what company supplied the breathing machine. PCP has appointment for 2/1 and will evaluate the trilogy or CPAP and arrange to have a new face mask delivered. [facility] ambulance to transport patient home at 1 PM today. Care Management Interventions  PCP Verified by CM:  Yes  Mode of Transport at Discharge: [address]  [phone] Time of Discharge: 1300  Transition of Care Consult (CM Consult): [address]: No  Reason Outside [place]:  (Patient request)  Physical Therapy Consult: Yes  Occupational Therapy Consult: Yes  Speech Therapy Consult: No  Current Support Network: Lives Alone  Plan discussed with Pt/Family/Caregiver: Yes  Freedom of Choice Offered: Yes  Discharge Location  Discharge Placement: Home with home health     [phone]

## 2018-01-24 NOTE — DISCHARGE SUMMARY
Hospitalist Discharge Summary     Patient ID:  Anupama Dias  [id number]  5 y.o.  1970    PCP on record: Levi Tobin MD    Admit date: 1/20/2018  Discharge date and time: 1/24/2018      DISCHARGE DIAGNOSIS:  Chest pain in setting of chronic hypoxic respiratory failure, obesity hypoventilation and GEN/noncompliance with CPAP, now resolved and back to baseline  Noninsulin dependent DM2 controlled without complication  Benign HTN  Lymphedema with chronic LLE wound  Asthma/COPD  Morbid Obesity    CONSULTATIONS:  IP CONSULT TO CARDIOLOGY  IP CONSULT TO CARDIOLOGY  IP CONSULT TO INTENSIVIST    Excerpted HPI from H&P of Jacqulynn Boeck, MD:  Martine Tovar is a 41 y.o.  female who presents with above. Pt says that she has been declining for months. She has had multiple ER visits for chest pain. Tonight she presents with chest pain again. She says that she is sitting and watching TV and then has pain in her L arm/shoulder that moves up into her neck and L chest when she feels like she is smothering. She feels like she is strangling on her O2 and has to take it off. She says that she cannot tolerate oxygen and cannot tolerate CPAP either so hasn't been using this for at least a couple of weeks. She denies nausea/vomiting or stool changes. No fever or URI sx but feels hot sometimes. She is frustrated with her edema but says that her doctor told her to come to the ER for this because he can't monitor her blood pressure with diuresis. She has a chronic LLE wound. Pt does not want to consider rehab options because \"no one can watch my house or pay my bills for me. \"    ______________________________________________________________________  DISCHARGE SUMMARY/HOSPITAL COURSE:  for full details see H&P, daily progress notes, labs, consult notes.      Chest pain in setting of chronic hypoxic respiratory failure, obesity hypoventilation and GEN/noncompliance with CPAP, now resolved and back to baseline:  Baseline O2 5 LPM  cardiology Inputs and recommendations Appreciated. troponins negative so far, no new EKG changes   ECHO. Ejection fraction wasestimated to be 55 %. Continue  diuresis with IV lasix 40 mg IV Q 12 hrs.  con't mevacor, lisinopril, metoprolol.  Con't ASA. prn nitrobid  CPAP at night   Pt to follow up with Dr. Prema Benavides on DC as that is her Pulmonologist  Pt discharged with Nebulizer RX and given PCP follow up as well as Cardiology follow up  Pt stable to be discharged with [facility]. Pt verbalizes agreement with the plan. Pt advised to be compliant with her home CPAP which she states that she will be.     Noninsulin dependent DM2 controlled without complication:  -Resume home meds    Benign HTN:  -Resume home meds     Lymphedema with chronic LLE wound:    Wound care Inputs and recommendations Appreciated. .    Asthma, COPD - no PFTs  -Resume home nebulizers     Morbid obesity  Advised on diet  Body mass index is 63.29 kg/(m^2). _______________________________________________________________________  Patient seen and examined by me on discharge day. Pertinent Findings:  Gen:    Not in distress, feels well  Chest: Clear lungs  CVS:   Regular rhythm. No edema  Abd:  Soft, not distended, not tender  Neuro:  Alert, AOx3  _______________________________________________________________________  DISCHARGE MEDICATIONS:   Discharge Medication List as of 1/24/2018 11:12 AM      START taking these medications    Details   traMADol (ULTRAM) 50 mg tablet Take 1 Tab by mouth every eight (8) hours as needed. Max Daily Amount: 150 mg., Print, Disp-15 Tab, R-0      Nebulizer & Compressor machine 1 Each by Does Not Apply route daily as needed. , Print, Disp-1 Each, R-0      nystatin (MYCOSTATIN) powder Apply  to affected area two (2) times a day., Print, Disp-1 Bottle, R-0         CONTINUE these medications which have CHANGED    Details   benzonatate (TESSALON) 100 mg capsule Take 1 Cap by mouth three (3) times daily as needed for Cough for up to 7 days. , Print, Disp-30 Cap, R-1         CONTINUE these medications which have NOT CHANGED    Details   nystatin (MYCOSTATIN) topical cream Apply  to affected area two (2) times daily as needed for Skin Irritation or Itching., Historical Med      cyclobenzaprine (FLEXERIL) 10 mg tablet Take 10 mg by mouth two (2) times daily as needed for Muscle Spasm(s). Indications: Muscle Spasm, Historical Med      hydrOXYzine pamoate (VISTARIL) 50 mg capsule Take 50 mg by mouth every eight (8) hours as needed for Itching. Indications: Pruritus of Skin, Historical Med      fluticasone (FLONASE) 50 mcg/actuation nasal spray 2 Sprays by Both Nostrils route daily. , Print, Disp-1 Bottle, R-0      albuterol (PROVENTIL VENTOLIN) 2.5 mg /3 mL (0.083 %) nebulizer solution 3 mL by Nebulization route every four (4) hours as needed for Wheezing., Print, Disp-24 Each, R-0      furosemide (LASIX) 40 mg tablet Take 40 mg by mouth daily. , Historical Med      lovastatin (MEVACOR) 40 mg tablet Take 1 Tab by mouth nightly., Normal, Disp-30 Tab, R-6      lisinopril (PRINIVIL, ZESTRIL) 40 mg tablet Take 40 mg by mouth daily. , Historical Med      glyBURIDE (DIABETA) 5 mg tablet Take 5 mg by mouth Daily (before breakfast). , Historical Med      cetirizine (ZYRTEC) 10 mg tablet Take 10 mg by mouth daily as needed for Allergies. , Historical Med      omeprazole (PRILOSEC) 40 mg capsule Take 40 mg by mouth daily. , Historical Med      ketoconazole (NIZORAL) 2 % topical cream Apply  to affected area daily. , Historical Med      ammonium lactate (LAC-HYDRIN) 12 % topical cream rub in to affected area well, Print, Disp-280 g, R-1      aspirin delayed-release 81 mg tablet Take 81 mg by mouth daily. , Historical Med      metoprolol (LOPRESSOR) 25 mg tablet Take 25 mg by mouth two (2) times a day., Historical Med      nitroglycerin (NITROSTAT) 0.4 mg SL tablet 1 Tab by SubLINGual route every five (5) minutes as needed for Chest Pain (call 911 if not relieved by 3). , Normal, Disp-25 Tab, R-2         STOP taking these medications       albuterol (PROVENTIL, VENTOLIN) 90 mcg/actuation inhaler Comments:   Reason for Stopping:               My Recommended Diet, Activity, Wound Care, and follow-up labs are listed in the patient's Discharge Insturctions which I have personally completed and reviewed.     ______________________________________________________________________    Risk of deterioration: Low    Condition at Discharge:  Stable  ______________________________________________________________________    Disposition  Home with family and home health services  ______________________________________________________________________    Care Plan discussed with:   Patient, Family, RN, Care Manager    ______________________________________________________________________    Code Status: Full Code  ______________________________________________________________________      Follow up with:   PCP : Esmer García MD  Follow-up Information     Follow up With Details Comments Contact Info    Deanna Zhu MD Schedule an appointment as soon as possible for a visit in 2 weeks  Danelle Rosenthal [address] [facility] Kitty García MD On 2/1/2018 The PCP office will call with a time [address]  Ragini Cavanaugh [address] [place] [address]   [phone]    [facility] Office on [address]  [id number]  [address] [place], [place] [phone]              Total time in minutes spent coordinating this discharge (includes going over instructions, follow-up, prescriptions, and preparing report for sign off to her PCP) :  35 minutes    Signed:  Antoine Whittington MD

## 2018-01-24 NOTE — CARDIO/PULMONARY
CP Rehab Note: chart review     Patient is on CHF bundle list            Admit: chest pain r/t Chest pain in setting of chronic hypoxic respiratory failure, obesity hypoventilation and GEN/noncompliance with CPAP      Mhx: CAD, CHF, COPD, DM, HTN, NSTEMI, SVT, morbid obesity, stents      Current smoker.      DIET DIABETIC CONSISTENT CARB Regular; 2 GM NA (House Low NA); FR 1500ML     Echo-LVEF 55-60%. Cardiology consulted.     History of CAD including  of LAD, REED to [place] in the past, but EKG, enzymes and echo normal.  Likely related to acute respiratory failure. No further ischemic evaluation needed at this time. Follow-up with me within 2-4 weeks of discharge to consider further evaluation. Preparing to be discharged today- staff currently in room giving pt care.

## 2018-01-26 ENCOUNTER — APPOINTMENT (OUTPATIENT)
Dept: GENERAL RADIOLOGY | Age: 48
End: 2018-01-26
Attending: EMERGENCY MEDICINE
Payer: MEDICARE

## 2018-01-26 ENCOUNTER — HOSPITAL ENCOUNTER (EMERGENCY)
Age: 48
Discharge: HOME | End: 2018-01-26
Attending: EMERGENCY MEDICINE
Payer: MEDICARE

## 2018-01-26 VITALS
TEMPERATURE: 97.3 F | HEART RATE: 90 BPM | SYSTOLIC BLOOD PRESSURE: 127 MMHG | HEIGHT: 67 IN | OXYGEN SATURATION: 94 % | BODY MASS INDEX: 45.99 KG/M2 | RESPIRATION RATE: 18 BRPM | WEIGHT: 293 LBS | DIASTOLIC BLOOD PRESSURE: 67 MMHG

## 2018-01-26 DIAGNOSIS — J44.1 CHRONIC OBSTRUCTIVE PULMONARY DISEASE WITH ACUTE EXACERBATION (HCC): Primary | ICD-10-CM

## 2018-01-26 LAB
ATRIAL RATE: 97 BPM
CALCULATED P AXIS, ECG09: 51 DEGREES
CALCULATED R AXIS, ECG10: 88 DEGREES
CALCULATED T AXIS, ECG11: 54 DEGREES
DIAGNOSIS, 93000: NORMAL
P-R INTERVAL, ECG05: 164 MS
Q-T INTERVAL, ECG07: 350 MS
QRS DURATION, ECG06: 92 MS
QTC CALCULATION (BEZET), ECG08: 444 MS
TROPONIN I SERPL-MCNC: <0.04 NG/ML
VENTRICULAR RATE, ECG03: 97 BPM

## 2018-01-26 PROCEDURE — 93005 ELECTROCARDIOGRAM TRACING: CPT

## 2018-01-26 PROCEDURE — 77030029684 HC NEB SM VOL KT MONA -A

## 2018-01-26 PROCEDURE — 74011000250 HC RX REV CODE- 250: Performed by: EMERGENCY MEDICINE

## 2018-01-26 PROCEDURE — 94640 AIRWAY INHALATION TREATMENT: CPT

## 2018-01-26 PROCEDURE — 99285 EMERGENCY DEPT VISIT HI MDM: CPT

## 2018-01-26 PROCEDURE — 84484 ASSAY OF TROPONIN QUANT: CPT | Performed by: EMERGENCY MEDICINE

## 2018-01-26 PROCEDURE — 36415 COLL VENOUS BLD VENIPUNCTURE: CPT | Performed by: EMERGENCY MEDICINE

## 2018-01-26 PROCEDURE — 71045 X-RAY EXAM CHEST 1 VIEW: CPT

## 2018-01-26 PROCEDURE — 74011636637 HC RX REV CODE- 636/637: Performed by: EMERGENCY MEDICINE

## 2018-01-26 PROCEDURE — A9270 NON-COVERED ITEM OR SERVICE: HCPCS | Performed by: EMERGENCY MEDICINE

## 2018-01-26 PROCEDURE — 74011250637 HC RX REV CODE- 250/637: Performed by: EMERGENCY MEDICINE

## 2018-01-26 RX ORDER — PREDNISONE 20 MG/1
60 TABLET ORAL
Status: COMPLETED | OUTPATIENT
Start: 2018-01-26 | End: 2018-01-26

## 2018-01-26 RX ORDER — HYDROCODONE POLISTIREX AND CHLORPHENIRAMINE POLISTIREX 10; 8 MG/5ML; MG/5ML
10 SUSPENSION, EXTENDED RELEASE ORAL
Status: COMPLETED | OUTPATIENT
Start: 2018-01-26 | End: 2018-01-26

## 2018-01-26 RX ORDER — HYDROCODONE POLISTIREX AND CHLORPHENIRAMINE POLISTIREX 10; 8 MG/5ML; MG/5ML
5 SUSPENSION, EXTENDED RELEASE ORAL
Status: DISCONTINUED | OUTPATIENT
Start: 2018-01-26 | End: 2018-01-26

## 2018-01-26 RX ORDER — IPRATROPIUM BROMIDE AND ALBUTEROL SULFATE 2.5; .5 MG/3ML; MG/3ML
3 SOLUTION RESPIRATORY (INHALATION) ONCE
Status: COMPLETED | OUTPATIENT
Start: 2018-01-26 | End: 2018-01-26

## 2018-01-26 RX ORDER — PREDNISONE 20 MG/1
40 TABLET ORAL DAILY
Qty: 10 TAB | Refills: 0 | Status: SHIPPED | OUTPATIENT
Start: 2018-01-26 | End: 2018-01-30

## 2018-01-26 RX ADMIN — IPRATROPIUM BROMIDE AND ALBUTEROL SULFATE 3 ML: .5; 3 SOLUTION RESPIRATORY (INHALATION) at 12:28

## 2018-01-26 RX ADMIN — PREDNISONE 60 MG: 20 TABLET ORAL at 14:24

## 2018-01-26 RX ADMIN — HYDROCODONE POLISTIREX AND CHLORPHENIRAMINE POLISTIREX 10 ML: 10; 8 SUSPENSION, EXTENDED RELEASE ORAL at 14:24

## 2018-01-26 NOTE — ED PROVIDER NOTES
EMERGENCY DEPARTMENT HISTORY AND PHYSICAL EXAM      Date: 1/26/2018  Patient Name: Liza Bledsoe    History of Presenting Illness     Chief Complaint   Patient presents with    Shortness of Breath     d/c x2 days from hospital and not getting any better. History Provided By: Patient and EMS    HPI: Liza Bledsoe, 41 y.o. female with PMHx significant for asthma, CHF, HTN, CAD, morbid obesity, DM, and COPD, and PSHx of cardiac stent placement, presents per EMS to the ED with cc of persistent SOB for the past two days. Pt denies associated pain, so severity or modifying factors related to pain cannot be assessed. She reports her inhaler broke recently, so received a new one but did not know how to use it until today. Pt notes she is on O2 3.5L NC at home at baseline. Pt is well known to the ER for frequent visits for COPD. Per pt records, she was admitted to the hospital from 1/20/18 - 1/24/18 for COPD and discharged home with Ultram and tessalon pearls. Pt states she now returns secondary to persistent SOB. Per pt records, she has an EF of 55 percent. She notes she has an appointment with her PCP on 2/1/18. Pt specifically denies fever/chills or CP. PCP: Joby Patel MD    There are no other complaints, changes, or physical findings at this time. Current Facility-Administered Medications   Medication Dose Route Frequency Provider Last Rate Last Dose    albuterol-ipratropium (DUO-NEB) 2.5 MG-0.5 MG/3 ML  3 mL Nebulization ONCE Maine Miller MD         Current Outpatient Prescriptions   Medication Sig Dispense Refill    benzonatate (TESSALON) 100 mg capsule Take 1 Cap by mouth three (3) times daily as needed for Cough for up to 7 days. 30 Cap 1    traMADol (ULTRAM) 50 mg tablet Take 1 Tab by mouth every eight (8) hours as needed. Max Daily Amount: 150 mg. 15 Tab 0    Nebulizer & Compressor machine 1 Each by Does Not Apply route daily as needed.  1 Each 0    nystatin (MYCOSTATIN) topical cream Apply  to affected area two (2) times a day. 15 g 0    cyclobenzaprine (FLEXERIL) 10 mg tablet Take 10 mg by mouth two (2) times daily as needed for Muscle Spasm(s). Indications: Muscle Spasm      hydrOXYzine pamoate (VISTARIL) 50 mg capsule Take 50 mg by mouth every eight (8) hours as needed for Itching. Indications: Pruritus of Skin      nystatin (MYCOSTATIN) topical cream Apply  to affected area two (2) times daily as needed for Skin Irritation or Itching.  fluticasone (FLONASE) 50 mcg/actuation nasal spray 2 Sprays by Both Nostrils route daily. 1 Bottle 0    albuterol (PROVENTIL VENTOLIN) 2.5 mg /3 mL (0.083 %) nebulizer solution 3 mL by Nebulization route every four (4) hours as needed for Wheezing. 24 Each 0    furosemide (LASIX) 40 mg tablet Take 40 mg by mouth daily.  lovastatin (MEVACOR) 40 mg tablet Take 1 Tab by mouth nightly. 30 Tab 6    lisinopril (PRINIVIL, ZESTRIL) 40 mg tablet Take 40 mg by mouth daily.  glyBURIDE (DIABETA) 5 mg tablet Take 5 mg by mouth Daily (before breakfast).  cetirizine (ZYRTEC) 10 mg tablet Take 10 mg by mouth daily as needed for Allergies.  omeprazole (PRILOSEC) 40 mg capsule Take 40 mg by mouth daily.  ketoconazole (NIZORAL) 2 % topical cream Apply  to affected area daily.  ammonium lactate (LAC-HYDRIN) 12 % topical cream rub in to affected area well 280 g 1    aspirin delayed-release 81 mg tablet Take 81 mg by mouth daily.  metoprolol (LOPRESSOR) 25 mg tablet Take 25 mg by mouth two (2) times a day.  nitroglycerin (NITROSTAT) 0.4 mg SL tablet 1 Tab by SubLINGual route every five (5) minutes as needed for Chest Pain (call 911 if not relieved by 3).  25 Tab 2       Past History     Past Medical History:  Past Medical History:   Diagnosis Date    Arthritis     Asthma     CAD (coronary artery disease)     Congestive heart failure (HCC)     [facility]     [facility]     Hypertension     Morbid obesity with BMI of 70 and over, adult ([facility]     NSTEMI (non-ST elevated myocardial infarction) ([facility]     [facility] tachycardia[facility]        Past Surgical History:  Past Surgical History:   Procedure Laterality Date    CARDIAC SURG PROCEDURE UNLIST      Stents    HX  SECTION      HX ORTHOPAEDIC      HX OTHER SURGICAL      cyst removed from back       Family History:  Family History   Problem Relation Age of Onset    Heart Disease Mother     Heart Disease Father     Heart Disease Sister        Social History:  Social History   Substance Use Topics    Smoking status: Current Every Day Smoker     Packs/day: 0.25     Types: Cigarettes    Smokeless tobacco: Never Used    Alcohol use No       Allergies:  No Known Allergies      Review of Systems   Review of Systems   Constitutional: Negative for activity change, chills and fever. HENT: Negative for congestion and sore throat. Eyes: Negative for pain and redness. Respiratory: Positive for shortness of breath. Negative for cough and chest tightness. Cardiovascular: Negative for chest pain and palpitations. Gastrointestinal: Negative for abdominal pain, diarrhea, nausea and vomiting. Genitourinary: Negative for dysuria, frequency and urgency. Musculoskeletal: Negative for back pain and neck pain. Skin: Negative for rash. Neurological: Negative for syncope, light-headedness and headaches. Psychiatric/Behavioral: Negative for confusion. All other systems reviewed and are negative. Physical Exam   Physical Exam   Constitutional: She is oriented to person, place, and time. She appears well-developed and well-nourished. No distress. Pt is super, morbidly obese   HENT:   Head: Normocephalic. Nose: Nose normal.   Mouth/Throat: Oropharynx is clear and moist. No oropharyngeal exudate. Eyes: Conjunctivae are normal. Pupils are equal, round, and reactive to light. No scleral icterus.    Neck: Normal range of motion. Neck supple. No JVD present. No tracheal deviation present. No thyromegaly present. Cardiovascular: Normal rate, regular rhythm and intact distal pulses. Exam reveals no gallop and no friction rub. No murmur heard. Pulmonary/Chest: Effort normal. No stridor. No respiratory distress. She has wheezes (minimal, BL, expiratory). She has no rales. Talks in normal sentences; good air movement   Abdominal: Soft. Bowel sounds are normal. She exhibits no distension. There is no tenderness. There is no rebound and no guarding. Musculoskeletal: Normal range of motion. She exhibits no edema. Lymphadenopathy:     She has no cervical adenopathy. Neurological: She is alert and oriented to person, place, and time. No cranial nerve deficit. She exhibits normal muscle tone. Coordination normal.   Skin: Skin is warm and dry. No rash noted. She is not diaphoretic. No erythema. Psychiatric: She has a normal mood and affect. Her behavior is normal.   Nursing note and vitals reviewed. Diagnostic Study Results     Labs -     Recent Results (from the past 12 hour(s))   EKG, 12 LEAD, INITIAL    Collection Time: 01/26/18 12:15 PM   Result Value Ref Range    Ventricular Rate 97 BPM    Atrial Rate 97 BPM    P-R Interval 164 ms    QRS Duration 92 ms    Q-T Interval 350 ms    QTC Calculation (Bezet) 444 ms    Calculated P Axis 51 degrees    Calculated R Axis 88 degrees    Calculated T Axis 54 degrees    Diagnosis       Sinus rhythm with premature atrial complexes  Low voltage QRS  Incomplete right bundle branch block  Borderline ECG  When compared with ECG of 20-JAN-2018 22:48,  premature atrial complexes are now present     TROPONIN I    Collection Time: 01/26/18 12:55 PM   Result Value Ref Range    Troponin-I, Qt. <0.04 <0.05 ng/mL       Radiologic Studies -   INDICATION: Chest pain.      Portable AP semiupright view of the chest.     Direct comparison made to prior chest x-ray dated 1/20/2018.    Cardiomediastinal silhouette is stable. Lungs are clear bilaterally. Pleural  spaces are normal. Osseous structures are intact.     IMPRESSION  IMPRESSION: No acute cardiopulmonary disease.              Medical Decision Making   I am the first provider for this patient. I reviewed the vital signs, available nursing notes, past medical history, past surgical history, family history and social history. Vital Signs-Reviewed the patient's vital signs. Patient Vitals for the past 12 hrs:   Temp Pulse Resp BP SpO2   01/26/18 1252 - 99 28 - 90 %   01/26/18 1232 97.3 °F (36.3 °C) 96 19 106/54 93 %   01/26/18 1208 - - - - (!) 87 %     EKG interpretation: (1215)  Rhythm: normal sinus rhythm; and regular . Rate (approx.): 97; Axis: normal; RI interval: normal; QRS interval: incomplete RBBB; ST/T wave: non-specific changes. Written by Oscar Ortega, ED Scribe, as dictated by Thania Loomis MD.    Records Reviewed: Nursing Notes and Old Medical Records    Provider Notes (Medical Decision Making):   DDx: malingering, chronic respiratory failure, acute on chronic COPD, PNA, CHF. ED Course:   Initial assessment performed. The patients presenting problems have been discussed, and they are in agreement with the care plan formulated and outlined with them. I have encouraged them to ask questions as they arise throughout their visit. Disposition:    DISCHARGE NOTE  1:38 PM  The patient has been re-evaluated and is ready for discharge. Reviewed available results with patient. Counseled pt on diagnosis and care plan. Pt has expressed understanding, and all questions have been answered. Pt agrees with plan and agrees to F/U as recommended, or return to the ED if their sxs worsen. Discharge instructions have been provided and explained to the pt, along with reasons to return to the ED.  Pt well appearing; afebrile; baseline sats; pt refuses to use her nasal O2 as prescribed; consulted ; will dc home; set up for [facility] follow up; clear cxr; negative troponin;   Written by Tulio Gregorio, ED Scribe, as dictated by Blaine Gomes MD.    PLAN:  1. Current Discharge Medication List      START taking these medications    Details   predniSONE (DELTASONE) 20 mg tablet Take 2 Tabs by mouth daily for 5 days. Qty: 10 Tab, Refills: 0           2. Follow-up Information     Follow up With Details Comments Contact Info    [facility] On 1/27/2018 [facility]. [facility] [address] [facility]  [address] [facility]    Stepan Gregorio MD Schedule an appointment as soon as possible for a visit in 3 days  [address] [facility]  [phone]          Return to ED if worse     Diagnosis     Clinical Impression:   1. Chronic obstructive pulmonary disease with acute exacerbation ([facility]        Attestations: This note is prepared by Tulio Gregorio, acting as Scribe for Blaine Gomes MD.    Blaine Gomes MD: The scribe's documentation has been prepared under my direction and personally reviewed by me in its entirety. I confirm that the note above accurately reflects all work, treatment, procedures, and medical decision making performed by me.

## 2018-01-26 NOTE — PROGRESS NOTES
** ED readmission assessment**  Pt is, 41 y.o. female with PMHx significant for asthma, CHF, HTN, CAD, morbid obesity, DM, and COPD, and PSHx of cardiac stent placement, presents per EMS to the ED with cc of persistent SOB for the past two days. she was admitted to the hospital from 1/20/18 - 1/24/18 for COPD and discharged home with home health. CM met pt and reviewed her demographic info. Pt said her care giver called 911 this morning. Pt has a new nebulizer,HO2 through [facility] [phone]. pt also conformed she got a new RW. CM spoke to pt about [facility] and scheduled an appointment for tomorrow. MD notified. Pt will discharge back to home. As per chart review a PCP appointment scheduled for 2/1/18 upon her last hospital admission. Provider info updated to pt's AVS.    Care Management Interventions  PCP Verified by CM:  Yes (PCP appointment schedueld for 2/1/2018)  Mode of Transport at Discharge: BLS  Transition of Care Consult (CM Consult): Home Health ([address] [facility])  [address]: No  Reason Outside [place]: Patient already serviced by other home care/hospice agency ([address] [facility] [address]  Discharge Durable Medical Equipment:  (pt owns [employer])  Current Support Network: Lives Alone, Has Personal Caregivers  Confirm Follow Up Transport: Self (medical transport)  Plan discussed with Pt/Family/Caregiver: Yes  Discharge Location  Discharge Placement: Home with home health ([place] [id number] and [facility] visit.)    Josee Wilkerson MS  ED Case Manager   Khk [phone]

## 2018-01-26 NOTE — DISCHARGE INSTRUCTIONS
Chronic Obstructive Pulmonary Disease (COPD): Care Instructions  Your Care Instructions    Chronic obstructive pulmonary disease (COPD) is a general term for a group of lung diseases, including emphysema and chronic bronchitis. People with COPD have decreased airflow in and out of the lungs, which makes it hard to breathe. The airways also can get clogged with thick mucus. Cigarette smoking is a major cause of COPD. Although there is no cure for COPD, you can slow its progress. Following your treatment plan and taking care of yourself can help you feel better and live longer. Follow-up care is a key part of your treatment and safety. Be sure to make and go to all appointments, and call your doctor if you are having problems. It's also a good idea to know your test results and keep a list of the medicines you take. How can you care for yourself at home? ?Staying healthy  ? · Do not smoke. This is the most important step you can take to prevent more damage to your lungs. If you need help quitting, talk to your doctor about stop-smoking programs and medicines. These can increase your chances of quitting for good. ? · Avoid colds and flu. Get a pneumococcal vaccine shot. If you have had one before, ask your doctor whether you need a second dose. Get the flu vaccine every fall. If you must be around people with colds or the flu, wash your hands often. ? · Avoid secondhand smoke, air pollution, and high altitudes. Also avoid cold, dry air and hot, humid air. Stay at home with your windows closed when air pollution is bad. ?Medicines and oxygen therapy  ? · Take your medicines exactly as prescribed. Call your doctor if you think you are having a problem with your medicine. ? · You may be taking medicines such as:  ¨ Bronchodilators. These help open your airways and make breathing easier. Bronchodilators are either short-acting (work for 6 to 9 hours) or long-acting (work for 24 hours).  You inhale most bronchodilators, so they start to act quickly. Always carry your quick-relief inhaler with you in case you need it while you are away from home. ¨ Corticosteroids (prednisone, budesonide). These reduce airway inflammation. They come in pill or inhaled form. You must take these medicines every day for them to work well. ? · A spacer may help you get more inhaled medicine to your lungs. Ask your doctor or pharmacist if a spacer is right for you. If it is, ask how to use it properly. ? · Do not take any vitamins, over-the-counter medicine, or herbal products without talking to your doctor first.   ? · If your doctor prescribed antibiotics, take them as directed. Do not stop taking them just because you feel better. You need to take the full course of antibiotics. ? · Oxygen therapy boosts the amount of oxygen in your blood and helps you breathe easier. Use the flow rate your doctor has recommended, and do not change it without talking to your doctor first.   Activity  ? · Get regular exercise. Walking is an easy way to get exercise. Start out slowly, and walk a little more each day. ? · Pay attention to your breathing. You are exercising too hard if you cannot talk while you are exercising. ? · Take short rest breaks when doing household chores and other activities. ? · Learn breathing methods-such as breathing through pursed lips-to help you become less short of breath. ? · If your doctor has not set you up with a pulmonary rehabilitation program, talk to him or her about whether rehab is right for you. Rehab includes exercise programs, education about your disease and how to manage it, help with diet and other changes, and emotional support. Diet  ? · Eat regular, healthy meals. Use bronchodilators about 1 hour before you eat to make it easier to eat. Eat several small meals instead of three large ones. Drink beverages at the end of the meal. Avoid foods that are hard to chew.    ? · Eat foods that contain protein so that you do not lose muscle mass. ? · Talk with your doctor if you gain too much weight or if you lose weight without trying. ?Mental health  ? · Talk to your family, friends, or a therapist about your feelings. It is normal to feel frightened, angry, hopeless, helpless, and even guilty. Talking openly about bad feelings can help you cope. If these feelings last, talk to your doctor. When should you call for help? Call 911 anytime you think you may need emergency care. For example, call if:  ? · You have severe trouble breathing. ?Call your doctor now or seek immediate medical care if:  ? · You have new or worse trouble breathing. ? · You cough up blood. ? · You have a fever. ? Watch closely for changes in your health, and be sure to contact your doctor if:  ? · You cough more deeply or more often, especially if you notice more mucus or a change in the color of your mucus. ? · You have new or worse swelling in your legs or belly. ? · You are not getting better as expected. Where can you learn more? Go to http://cassia-donte.info/. Arline Arndt in the search box to learn more about \"Chronic Obstructive Pulmonary Disease (COPD): Care Instructions. \"  Current as of: May 12, 2017  Content Version: 11.4  © 5397-3507 [employer]. Care instructions adapted under license by [employer] (which disclaims liability or warranty for this information). If you have questions about a medical condition or this instruction, always ask your healthcare professional. [place] [address] any warranty or liability for your use of this information. COPD Exacerbation Plan: Care Instructions  Your Care Instructions    If you have chronic obstructive pulmonary disease (COPD), your usual shortness of breath could suddenly get worse. You may start coughing more and have more mucus.  This flare-up is called a COPD exacerbation (say \"ay-WWP-ti-BAY-shun\"). A lung infection or air pollution could set off an exacerbation. Sometimes it can happen after a quick change in temperature or being around chemicals. Work with your doctor to make a plan for dealing with an exacerbation. You can better manage it if you plan ahead. Follow-up care is a key part of your treatment and safety. Be sure to make and go to all appointments, and call your doctor if you are having problems. It's also a good idea to know your test results and keep a list of the medicines you take. How can you care for yourself at home? During an exacerbation  · Do not panic if you start to have one. Quick treatment at home may help you prevent serious breathing problems. If you have a COPD exacerbation plan that you developed with your doctor, follow it. · Take your medicines exactly as your doctor tells you. ¨ Use your inhaler as directed by your doctor. If your symptoms do not get better after you use your medicine, have someone take you to the emergency room. Call an ambulance if necessary. ¨ With inhaled medicines, a spacer or a nebulizer may help you get more medicine to your lungs. Ask your doctor or pharmacist how to use them properly. Practice using the spacer in front of a mirror before you have an exacerbation. This may help you get the medicine into your lungs quickly. ¨ If your doctor has given you steroid pills, take them as directed. ¨ Your doctor may have given you a prescription for antibiotics, which you can fill if you need it. ¨ Talk to your doctor if you have any problems with your medicine. And call your doctor if you have to use your antibiotic or steroid pills. Preventing an exacerbation  · Do not smoke. This is the most important step you can take to prevent more damage to your lungs and prevent problems. If you already smoke, it is never too late to stop. If you need help quitting, talk to your doctor about stop-smoking programs and medicines.  These can increase your chances of quitting for good. · Take your daily medicines as prescribed. · Avoid colds and flu. ¨ Get a pneumococcal vaccine. ¨ Get a flu vaccine each year, as soon as it is available. Ask those you live or work with to do the same, so they will not get the flu and infect you. ¨ Try to stay away from people with colds or the flu. ¨ Wash your hands often. · Avoid secondhand smoke; air pollution; cold, dry air; hot, humid air; and high altitudes. Stay at home with your windows closed when air pollution is bad. · Learn breathing techniques for COPD, such as breathing through pursed lips. These techniques can help you breathe easier during an exacerbation. When should you call for help? Call 911 anytime you think you may need emergency care. For example, call if:  ? · You have severe trouble breathing. ? · You have severe chest pain. ?Call your doctor now or seek immediate medical care if:  ? · You have new or worse shortness of breath. ? · You develop new chest pain. ? · You are coughing more deeply or more often, especially if you notice more mucus or a change in the color of your mucus. ? · You cough up blood. ? · You have new or increased swelling in your legs or belly. ? · You have a fever. ? Watch closely for changes in your health, and be sure to contact your doctor if:  ? · You need to use your antibiotic or steroid pills. ? · Your symptoms are getting worse. Where can you learn more? Go to http://cassia-donte.info/. Enter J133 in the search box to learn more about \"COPD Exacerbation Plan: Care Instructions. \"  Current as of: May 12, 2017  Content Version: 11.4  © 9327-9628 [employer]. Care instructions adapted under license by [employer] (which disclaims liability or warranty for this information).  If you have questions about a medical condition or this instruction, always ask your healthcare professional. [employer] disclaims any warranty or liability for your use of this information.

## 2018-01-26 NOTE — ED NOTES
MD Campos reviewed discharge instructions with the patient. The patient verbalized understanding. Patient discharged home with vitals at baseline. Patient ambulatory to [facility] with transportation provided by [facility]. Patient has no further complaints noted at this time.

## 2018-01-26 NOTE — ED NOTES
Patient requesting something to eat. I have contacted dietary services and a meal tray has been ordered.

## 2018-01-26 NOTE — ED NOTES
Patient reports to ED via EMS with complaints of SOB x 3 weeks. Patient was recently discharged from the ED 2 days ago because of the same situation. Patient did not have his cough medication filled because \"She did not have a ride\" to do so. She states she also has not taken any of her nebulizer treatments (although she is supposed to 4x/day) because her machine was broken. She had her machine replaced and states she was not aware of how to use the new one. She states her home health nurse came to see her and told her how to fix the machine today and she states \"I guess I know how to use it now. \"    Patient also states \"I can not tolerate my oxygen anymore. I am supposed to wear 5L but it is too much, so I took it down to 3.5 L\" Patient arrived to ED and on 3L by EMS, her O2 saturation was 87-88% Patient's oxygen increased to 5L and saturations increased to 93%. Patient placed on the monitor x3, call bell within reach. Side rails x1    1251 Patient ambulating in room from bed to chair.  Saturations maintained at 90%

## 2018-01-27 ENCOUNTER — HOSPITAL ENCOUNTER (EMERGENCY)
Age: 48
Discharge: HOME | DRG: 189 | End: 2018-01-27
Attending: EMERGENCY MEDICINE
Payer: MEDICARE

## 2018-01-27 ENCOUNTER — APPOINTMENT (OUTPATIENT)
Dept: GENERAL RADIOLOGY | Age: 48
DRG: 189 | End: 2018-01-27
Payer: MEDICARE

## 2018-01-27 VITALS
SYSTOLIC BLOOD PRESSURE: 122 MMHG | DIASTOLIC BLOOD PRESSURE: 97 MMHG | OXYGEN SATURATION: 94 % | HEIGHT: 66 IN | HEART RATE: 91 BPM | TEMPERATURE: 97.7 F | RESPIRATION RATE: 18 BRPM

## 2018-01-27 DIAGNOSIS — F17.200 TOBACCO DEPENDENCE: ICD-10-CM

## 2018-01-27 DIAGNOSIS — J44.1 ACUTE EXACERBATION OF CHRONIC OBSTRUCTIVE PULMONARY DISEASE (COPD) (HCC): Primary | ICD-10-CM

## 2018-01-27 DIAGNOSIS — Z91.199 MEDICALLY NONCOMPLIANT: ICD-10-CM

## 2018-01-27 DIAGNOSIS — Z87.01 HISTORY OF PNEUMONIA: ICD-10-CM

## 2018-01-27 LAB
ARTERIAL PATENCY WRIST A: YES
BASE EXCESS BLDA CALC-SCNC: 8.2 MMOL/L
BDY SITE: ABNORMAL
HCO3 BLDA-SCNC: 37 MMOL/L (ref 22–26)
PCO2 BLDA: 73 MMHG (ref 35–45)
PH BLDA: 7.33 [PH] (ref 7.35–7.45)
PO2 BLDA: 66 MMHG (ref 80–100)
SAO2 % BLD: 91 % (ref 92–97)
SAO2% DEVICE SAO2% SENSOR NAME: ABNORMAL
SPECIMEN SITE: ABNORMAL

## 2018-01-27 PROCEDURE — 94640 AIRWAY INHALATION TREATMENT: CPT

## 2018-01-27 PROCEDURE — 77030029684 HC NEB SM VOL KT MONA -A

## 2018-01-27 PROCEDURE — 99285 EMERGENCY DEPT VISIT HI MDM: CPT

## 2018-01-27 PROCEDURE — 74011000250 HC RX REV CODE- 250: Performed by: PHYSICIAN ASSISTANT

## 2018-01-27 PROCEDURE — 36600 WITHDRAWAL OF ARTERIAL BLOOD: CPT | Performed by: PHYSICIAN ASSISTANT

## 2018-01-27 PROCEDURE — 82803 BLOOD GASES ANY COMBINATION: CPT | Performed by: PHYSICIAN ASSISTANT

## 2018-01-27 PROCEDURE — 71045 X-RAY EXAM CHEST 1 VIEW: CPT

## 2018-01-27 PROCEDURE — 93005 ELECTROCARDIOGRAM TRACING: CPT

## 2018-01-27 RX ORDER — ALBUTEROL SULFATE 2.5 MG/.5ML
5 SOLUTION RESPIRATORY (INHALATION)
Status: COMPLETED | OUTPATIENT
Start: 2018-01-27 | End: 2018-01-27

## 2018-01-27 RX ORDER — IPRATROPIUM BROMIDE AND ALBUTEROL SULFATE 2.5; .5 MG/3ML; MG/3ML
3 SOLUTION RESPIRATORY (INHALATION)
Status: COMPLETED | OUTPATIENT
Start: 2018-01-27 | End: 2018-01-27

## 2018-01-27 RX ADMIN — IPRATROPIUM BROMIDE AND ALBUTEROL SULFATE 3 ML: .5; 3 SOLUTION RESPIRATORY (INHALATION) at 16:16

## 2018-01-27 RX ADMIN — ALBUTEROL SULFATE 5 MG: 2.5 SOLUTION RESPIRATORY (INHALATION) at 17:45

## 2018-01-27 NOTE — ED NOTES
Assumed care of pt from triage. Pt c/o shortness of breath and productive cough with yellow sputum. Pt reports she was seen and discharged yesterday for same c/c. Pt reports she has not been able to  her prescription from yesterday. Pt tachypnec and reports shortness of breath at rest. Pt able to speak in complete sentences. Pt placed on monitor x3. Pt allows this RN to increase O2 to 5L.

## 2018-01-27 NOTE — ED NOTES
Patient found to be hypoxic at 85-88% on 4L nc and patient refusing to let this RN increase oxygen at this time stating she cannot tolerate it. Patient is verbally abuse to this RN, yelling and cursing at staff.

## 2018-01-27 NOTE — DISCHARGE INSTRUCTIONS
Chronic Obstructive Pulmonary Disease (COPD): Care Instructions  Your Care Instructions    Chronic obstructive pulmonary disease (COPD) is a general term for a group of lung diseases, including emphysema and chronic bronchitis. People with COPD have decreased airflow in and out of the lungs, which makes it hard to breathe. The airways also can get clogged with thick mucus. Cigarette smoking is a major cause of COPD. Although there is no cure for COPD, you can slow its progress. Following your treatment plan and taking care of yourself can help you feel better and live longer. Follow-up care is a key part of your treatment and safety. Be sure to make and go to all appointments, and call your doctor if you are having problems. It's also a good idea to know your test results and keep a list of the medicines you take. How can you care for yourself at home? ?Staying healthy  ? · Do not smoke. This is the most important step you can take to prevent more damage to your lungs. If you need help quitting, talk to your doctor about stop-smoking programs and medicines. These can increase your chances of quitting for good. ? · Avoid colds and flu. Get a pneumococcal vaccine shot. If you have had one before, ask your doctor whether you need a second dose. Get the flu vaccine every fall. If you must be around people with colds or the flu, wash your hands often. ? · Avoid secondhand smoke, air pollution, and high altitudes. Also avoid cold, dry air and hot, humid air. Stay at home with your windows closed when air pollution is bad. ?Medicines and oxygen therapy  ? · Take your medicines exactly as prescribed. Call your doctor if you think you are having a problem with your medicine. ? · You may be taking medicines such as:  ¨ Bronchodilators. These help open your airways and make breathing easier. Bronchodilators are either short-acting (work for 6 to 9 hours) or long-acting (work for 24 hours).  You inhale most bronchodilators, so they start to act quickly. Always carry your quick-relief inhaler with you in case you need it while you are away from home. ¨ Corticosteroids (prednisone, budesonide). These reduce airway inflammation. They come in pill or inhaled form. You must take these medicines every day for them to work well. ? · A spacer may help you get more inhaled medicine to your lungs. Ask your doctor or pharmacist if a spacer is right for you. If it is, ask how to use it properly. ? · Do not take any vitamins, over-the-counter medicine, or herbal products without talking to your doctor first.   ? · If your doctor prescribed antibiotics, take them as directed. Do not stop taking them just because you feel better. You need to take the full course of antibiotics. ? · Oxygen therapy boosts the amount of oxygen in your blood and helps you breathe easier. Use the flow rate your doctor has recommended, and do not change it without talking to your doctor first.   Activity  ? · Get regular exercise. Walking is an easy way to get exercise. Start out slowly, and walk a little more each day. ? · Pay attention to your breathing. You are exercising too hard if you cannot talk while you are exercising. ? · Take short rest breaks when doing household chores and other activities. ? · Learn breathing methods-such as breathing through pursed lips-to help you become less short of breath. ? · If your doctor has not set you up with a pulmonary rehabilitation program, talk to him or her about whether rehab is right for you. Rehab includes exercise programs, education about your disease and how to manage it, help with diet and other changes, and emotional support. Diet  ? · Eat regular, healthy meals. Use bronchodilators about 1 hour before you eat to make it easier to eat. Eat several small meals instead of three large ones. Drink beverages at the end of the meal. Avoid foods that are hard to chew.    ? · Eat foods that contain protein so that you do not lose muscle mass. ? · Talk with your doctor if you gain too much weight or if you lose weight without trying. ?Mental health  ? · Talk to your family, friends, or a therapist about your feelings. It is normal to feel frightened, angry, hopeless, helpless, and even guilty. Talking openly about bad feelings can help you cope. If these feelings last, talk to your doctor. When should you call for help? Call 911 anytime you think you may need emergency care. For example, call if:  ? · You have severe trouble breathing. ?Call your doctor now or seek immediate medical care if:  ? · You have new or worse trouble breathing. ? · You cough up blood. ? · You have a fever. ? Watch closely for changes in your health, and be sure to contact your doctor if:  ? · You cough more deeply or more often, especially if you notice more mucus or a change in the color of your mucus. ? · You have new or worse swelling in your legs or belly. ? · You are not getting better as expected. Where can you learn more? Go to http://cassia-donte.info/. Marinell Habermann in the search box to learn more about \"Chronic Obstructive Pulmonary Disease (COPD): Care Instructions. \"  Current as of: May 12, 2017  Content Version: 11.4  © 8767-2211 [employer]. Care instructions adapted under license by [employer] (which disclaims liability or warranty for this information). If you have questions about a medical condition or this instruction, always ask your healthcare professional. [address] any warranty or liability for your use of this information.

## 2018-01-27 NOTE — ED PROVIDER NOTES
EMERGENCY DEPARTMENT HISTORY AND PHYSICAL EXAM      Date: 1/27/2018  Patient Name: Trevin Royal    History of Presenting Illness     Chief Complaint   Patient presents with    Shortness of Breath     Via EMS w/ c/o SOB x 3 weeks w/ prod cough w/ brown sputum. Pt reports wearing 5L nc at home at all times       History Provided By: Patient and EMS    HPI: Trevin Royal, 41 y.o. female with PMHx significant for asthma, morbid obesity, COPD, CHF, hypertension, and diabetes, presents via EMS to the ED with cc of persistent SOB x 3 weeks. Pt notes associated symptom of a productive \"brown sputum\" cough. Per EMS pt wears 5 L O2 at baseline at home. Per pt records, she was admitted to the hospital from 1/20/18 - 1/24/18 for COPD. Pt was seen on 01/26 for similar symptoms and discharged home. Pt was given a dose of steroids yesterday while in [id number] [place] ED and by dispatch health today. She reports \"they sent me here and told me I need to get one of those machines to check my oxygen\" \"but I can't afford that\". Pt denies chest pain, nausea, vomiting, abdominal pain, fever, chills, any urinary symptoms, or any exacerbating/modifying factors. PCP: Linwood Mancini MD    There are no other complaints, changes, or physical findings at this time. Current Facility-Administered Medications   Medication Dose Route Frequency Provider Last Rate Last Dose    albuterol CONCENTRATE 2.5mg/0.5 mL neb soln  5 mg Nebulization NOW [place], [place]         Current Outpatient Prescriptions   Medication Sig Dispense Refill    predniSONE (DELTASONE) 20 mg tablet Take 2 Tabs by mouth daily for 5 days. 10 Tab 0    benzonatate (TESSALON) 100 mg capsule Take 1 Cap by mouth three (3) times daily as needed for Cough for up to 7 days. 30 Cap 1    traMADol (ULTRAM) 50 mg tablet Take 1 Tab by mouth every eight (8) hours as needed.  Max Daily Amount: 150 mg. 15 Tab 0    Nebulizer & Compressor machine 1 Each by Does Not Apply route daily as needed. 1 Each 0    nystatin (MYCOSTATIN) topical cream Apply  to affected area two (2) times a day. 15 g 0    cyclobenzaprine (FLEXERIL) 10 mg tablet Take 10 mg by mouth two (2) times daily as needed for Muscle Spasm(s). Indications: Muscle Spasm      hydrOXYzine pamoate (VISTARIL) 50 mg capsule Take 50 mg by mouth every eight (8) hours as needed for Itching. Indications: Pruritus of Skin      nystatin (MYCOSTATIN) topical cream Apply  to affected area two (2) times daily as needed for Skin Irritation or Itching.  fluticasone (FLONASE) 50 mcg/actuation nasal spray 2 Sprays by Both Nostrils route daily. 1 Bottle 0    albuterol (PROVENTIL VENTOLIN) 2.5 mg /3 mL (0.083 %) nebulizer solution 3 mL by Nebulization route every four (4) hours as needed for Wheezing. 24 Each 0    furosemide (LASIX) 40 mg tablet Take 40 mg by mouth daily.  lovastatin (MEVACOR) 40 mg tablet Take 1 Tab by mouth nightly. 30 Tab 6    lisinopril (PRINIVIL, ZESTRIL) 40 mg tablet Take 40 mg by mouth daily.  glyBURIDE (DIABETA) 5 mg tablet Take 5 mg by mouth Daily (before breakfast).  cetirizine (ZYRTEC) 10 mg tablet Take 10 mg by mouth daily as needed for Allergies.  omeprazole (PRILOSEC) 40 mg capsule Take 40 mg by mouth daily.  ketoconazole (NIZORAL) 2 % topical cream Apply  to affected area daily.  ammonium lactate (LAC-HYDRIN) 12 % topical cream rub in to affected area well 280 g 1    aspirin delayed-release 81 mg tablet Take 81 mg by mouth daily.  metoprolol (LOPRESSOR) 25 mg tablet Take 25 mg by mouth two (2) times a day.  nitroglycerin (NITROSTAT) 0.4 mg SL tablet 1 Tab by SubLINGual route every five (5) minutes as needed for Chest Pain (call 911 if not relieved by 3).  25 Tab 2       Past History     Past Medical History:  Past Medical History:   Diagnosis Date    Arthritis     Asthma     CAD (coronary artery disease)     Congestive heart failure (HCC)     [facility] [address]     [facility]     Hypertension     Morbid obesity with BMI of 70 and over, adult ([facility]     NSTEMI (non-ST elevated [facility] infarction) [facility]     SV[facility]ra[facility]ricular tachycardia) ([facility]        Past Surgical History:  Past Surgical History:   Procedure Laterality Date    CARDIAC SURG PROCEDURE UNLIST      Stents    HX  SECTION      HX ORTHOPAEDIC      HX OTHER SURGICAL      cyst removed from back       Family History:  Family History   Problem Relation Age of Onset    Heart Disease Mother     Heart Disease Father     Heart Disease Sister        Social History:  Social History   Substance Use Topics    Smoking status: Current Every Day Smoker     Packs/day: 0.25     Types: Cigarettes    Smokeless tobacco: Never Used      Comment: Patient states \"I  aint smoking no more d*mn cigarettes after yesterday\" 2018    Alcohol use No       Allergies:  No Known Allergies      Review of Systems   Review of Systems   Constitutional: Negative for chills and fever. HENT: Negative for congestion, rhinorrhea and sore throat. Eyes: Negative for discharge, redness and itching. Respiratory: Positive for cough (productive \"brown\") and shortness of breath. Cardiovascular: Negative for chest pain and palpitations. Gastrointestinal: Negative for abdominal pain, diarrhea, nausea and vomiting. Endocrine: Negative for cold intolerance and heat intolerance. Genitourinary: Negative for dysuria and hematuria. Musculoskeletal: Negative for neck pain and neck stiffness. Skin: Negative for pallor, rash and wound. Allergic/Immunologic: Positive for immunocompromised state. Negative for environmental allergies and food allergies. Neurological: Negative for dizziness and headaches. Psychiatric/Behavioral: Negative for agitation and confusion. The patient is nervous/anxious.         Physical Exam   Physical Exam   Constitutional: She is oriented to person, place, and time. She appears well-developed and well-nourished. No distress. Nasal cannula in place. Morbidly obese AA female, wearing NC, alert, speaking in full sentences   HENT:   Head: Normocephalic and atraumatic. Nose: Nose normal.   Mouth/Throat: Oropharynx is clear and moist. No oropharyngeal exudate. Eyes: Conjunctivae and EOM are normal. Pupils are equal, round, and reactive to light. Right eye exhibits no discharge. Left eye exhibits no discharge. No scleral icterus. Neck: Normal range of motion. Neck supple. No JVD present. No tracheal deviation present. No thyromegaly present. Cardiovascular: Normal rate, regular rhythm and normal heart sounds. Pulmonary/Chest: No respiratory distress. She has no wheezes. Decreased breath sounds, however, pt is able to speak in complete sentences. sats remain 90% on 4L. Pt states she cannot tolerate 5L well as it induces headaches   Abdominal: Soft. She exhibits no distension. There is no tenderness. There is no rebound and no guarding. abd protuberant, obese, soft, NT   Musculoskeletal: Normal range of motion. She exhibits no edema. Lymphadenopathy:     She has no cervical adenopathy. Neurological: She is alert and oriented to person, place, and time. She exhibits normal muscle tone. Coordination normal.   Skin: Skin is warm and dry. She is not diaphoretic. No pallor. Scaly, dry lesions noted to flexor surfaces and distal LEs which appear chronic   Psychiatric: She has a normal mood and affect. Her behavior is normal. Judgment normal.   Nursing note and vitals reviewed.         Diagnostic Study Results     Labs -     Recent Results (from the past 12 hour(s))   EKG, 12 LEAD, INITIAL    Collection Time: 01/27/18  4:15 PM   Result Value Ref Range    Ventricular Rate 104 BPM    Atrial Rate 104 BPM    P-R Interval 162 ms    QRS Duration 86 ms    Q-T Interval 346 ms    QTC Calculation (Bezet) 454 ms    Calculated P Axis 58 degrees    Calculated R Axis 88 degrees    Calculated T Axis 49 degrees    Diagnosis       Sinus tachycardia  Low voltage QRS  When compared with ECG of 26-JAN-2018 12:15,  premature atrial complexes are no longer present     BLOOD GAS, ARTERIAL    Collection Time: 01/27/18  4:24 PM   Result Value Ref Range    pH 7.33 (L) 7.35 - 7.45      PCO2 73 (H) 35.0 - 45.0 mmHg    PO2 66 (L) 80 - 100 mmHg    O2 SAT 91 (L) 92 - 97 %    BICARBONATE 37 (H) 22 - 26 mmol/L    BASE EXCESS 8.2 mmol/L    O2 METHOD AEROSOL MASK      Sample source ARTERIAL      SITE RIGHT RADIAL      PARUL'S TEST YES         Radiologic Studies -   XR CHEST PORT   Final Result        CT Results  (Last 48 hours)    None        CXR Results  (Last 48 hours)               01/27/18 1625  XR CHEST PORT Final result    Impression:  Impression:   Unchanged mild cardiomegaly        Narrative:  Chest portable AP       History: Shortness of breath       Comparison: 1/26/2018       Findings: The lungs are well expanded. No focal consolidation, pleural   effusion, or pneumothorax. The heart is mildly enlarged, but unchanged. No   significant edema. The visualized osseous structures are unremarkable. 01/26/18 1234  XR CHEST PORT Final result    Impression:  IMPRESSION: No acute cardiopulmonary disease. Narrative:  INDICATION: Chest pain. Portable AP semiupright view of the chest.       Direct comparison made to prior chest x-ray dated 1/20/2018. Cardiomediastinal silhouette is stable. Lungs are clear bilaterally. Pleural   spaces are normal. Osseous structures are intact. Medical Decision Making   I am the first provider for this patient. I reviewed the vital signs, available nursing notes, past medical history, past surgical history, family history and social history. Vital Signs-Reviewed the patient's vital signs.   Patient Vitals for the past 12 hrs:   Temp Pulse Resp BP SpO2   01/27/18 1736 - - - - 95 %   01/27/18 1704 - - - - 93 % 01/27/18 1640 - - - - 94 %   01/27/18 1625 - 94 19 - 93 %   01/27/18 1606 - 93 - - 90 %   01/27/18 1514 97.7 °F (36.5 °C) 86 20 137/79 (!) 88 %       Pulse Oximetry Analysis - 90% on 5 L on nasal cannula        Records Reviewed: Old Medical Records, Previous Radiology Studies and Previous Laboratory Studies      ED Course:   Initial assessment performed. The patients presenting problems have been discussed, and they are in agreement with the care plan formulated and outlined with them. I have encouraged them to ask questions as they arise throughout their visit. 4:40 PM  Spoke with Roena Opitz, DO regarding pt, he advised to speak with Dr. Patricia Funes, the hospitalist who discharged her. CONSULT NOTE:   4:46 PM  KIM Verduzco spoke with Dr. Barroso Friend,   Specialty: Hospitalist  Discussed pt's hx, disposition, and available diagnostic and imaging results. Reviewed care plans. Consultant states the ABG was inline with when pt was admitted. Dr. Patricia Funes has left the property and will not be able to come evaluate pt, however he recommends to use best clinical judgment. Dr. Patricia Funes states the description of pt is similar to what pt was like during her admission. He recommends another breathing treatment and advise for pt to follow up with her pulmonologist.     Written by SHRUTHI Scanlonibe, as dictated by [employer]. Disposition:  DISCHARGE NOTE  5:40 PM  The patient has been re-evaluated and is ready for discharge. Reviewed available results with patient. Counseled pt on diagnosis and care plan. Pt has expressed understanding, and all questions have been answered. Pt agrees with plan and agrees to F/U as recommended, or return to the ED if their sxs worsen. Discharge instructions have been provided and explained to the pt, along with reasons to return to the ED. Written by SHRUTHI Scanlonibe, as dictated by [facility] [employer]. PLAN:  1.    Follow-up Information     Follow up With Details Comments Contact Info    Pasquale Hedrick MD   [phone] [id number]  [phone]      [facility] EMERGENCY DEPT  If symptoms worsen [address]  [address]   [address] [address] [phone]        2. Return to ED if worse     Diagnosis     Clinical Impression:   1. Acute exacerbation of chronic obstructive pulmonary disease (COPD) ([facility] [address]    2. History of pneumonia    3. Uncontrolled type 2 diabetes mellitus with complication, with long-term current use of insulin ([facility] [facility]    4. Medically noncompliant    5. Tobacco dependence        Attestations: This note is prepared by Kimmie Petersen, acting as Scribe for [employer]. The scribe's documentation has been prepared under my direction and personally reviewed by me in its entirety. I confirm that the note above accurately reflects all work, treatment, procedures, and medical decision making performed by me.   KIM Jo

## 2018-01-27 NOTE — ED NOTES
Patient given written/verbal DC instructions by Vidal Sierra. [facility] called for transportation back home.

## 2018-01-28 LAB
ATRIAL RATE: 104 BPM
CALCULATED P AXIS, ECG09: 58 DEGREES
CALCULATED R AXIS, ECG10: 88 DEGREES
CALCULATED T AXIS, ECG11: 49 DEGREES
DIAGNOSIS, 93000: NORMAL
P-R INTERVAL, ECG05: 162 MS
Q-T INTERVAL, ECG07: 346 MS
QRS DURATION, ECG06: 86 MS
QTC CALCULATION (BEZET), ECG08: 454 MS
VENTRICULAR RATE, ECG03: 104 BPM

## 2018-01-29 ENCOUNTER — HOSPITAL ENCOUNTER (INPATIENT)
Age: 48
LOS: 4 days | Discharge: HOME | DRG: 189 | End: 2018-02-03
Attending: EMERGENCY MEDICINE | Admitting: INTERNAL MEDICINE
Payer: MEDICARE

## 2018-01-29 ENCOUNTER — APPOINTMENT (OUTPATIENT)
Dept: GENERAL RADIOLOGY | Age: 48
DRG: 189 | End: 2018-01-29
Attending: EMERGENCY MEDICINE
Payer: MEDICARE

## 2018-01-29 DIAGNOSIS — J96.21 ACUTE ON CHRONIC RESPIRATORY FAILURE WITH HYPOXEMIA (HCC): Primary | ICD-10-CM

## 2018-01-29 PROCEDURE — 94640 AIRWAY INHALATION TREATMENT: CPT

## 2018-01-29 PROCEDURE — 80053 COMPREHEN METABOLIC PANEL: CPT | Performed by: EMERGENCY MEDICINE

## 2018-01-29 PROCEDURE — 82803 BLOOD GASES ANY COMBINATION: CPT | Performed by: EMERGENCY MEDICINE

## 2018-01-29 PROCEDURE — 99285 EMERGENCY DEPT VISIT HI MDM: CPT

## 2018-01-29 PROCEDURE — 84484 ASSAY OF TROPONIN QUANT: CPT | Performed by: EMERGENCY MEDICINE

## 2018-01-29 PROCEDURE — 83880 ASSAY OF NATRIURETIC PEPTIDE: CPT | Performed by: EMERGENCY MEDICINE

## 2018-01-29 PROCEDURE — 36600 WITHDRAWAL OF ARTERIAL BLOOD: CPT | Performed by: EMERGENCY MEDICINE

## 2018-01-29 PROCEDURE — 85025 COMPLETE CBC W/AUTO DIFF WBC: CPT | Performed by: EMERGENCY MEDICINE

## 2018-01-29 PROCEDURE — 82550 ASSAY OF CK (CPK): CPT | Performed by: EMERGENCY MEDICINE

## 2018-01-29 PROCEDURE — 71046 X-RAY EXAM CHEST 2 VIEWS: CPT

## 2018-01-29 PROCEDURE — 36415 COLL VENOUS BLD VENIPUNCTURE: CPT | Performed by: EMERGENCY MEDICINE

## 2018-01-29 NOTE — IP AVS SNAPSHOT
[address] [facility] 
[phone] Patient: Oren Poster MRN: [id number] PPK:[birth year] A check edwina indicates which time of day the medication should be taken. My Medications CHANGE how you take these medications Instructions Each Dose to Equal  
 Morning Noon Evening Bedtime  
 metoprolol tartrate 25 mg tablet Commonly known as:  LOPRESSOR What changed:  how much to take Take 0.5 Tabs by mouth two (2) times a day for 30 days. 12.5 mg  
    
  
   
   
   
  
 nystatin topical cream  
Commonly known as:  MYCOSTATIN What changed:  Another medication with the same name was removed. Continue taking this medication, and follow the directions you see here. Apply  to affected area two (2) times daily as needed for Skin Irritation or Itching. CONTINUE taking these medications Instructions Each Dose to Equal  
 Morning Noon Evening Bedtime  
 albuterol 2.5 mg /3 mL (0.083 %) nebulizer solution Commonly known as:  PROVENTIL VENTOLIN  
   
 3 mL by Nebulization route every four (4) hours as needed for Wheezing. 2.5 mg  
    
   
   
   
  
 ammonium lactate 12 % topical cream  
Commonly known as:  LAC-HYDRIN  
   
 rub in to affected area well  
     
   
   
   
  
 aspirin 81 mg chewable tablet Take 81 mg by mouth daily. 81 mg  
    
  
   
   
   
  
 cetirizine 10 mg tablet Commonly known as:  ZYRTEC Take 10 mg by mouth daily as needed for Allergies. 10 mg  
    
   
   
   
  
 fluticasone 50 mcg/actuation nasal spray Commonly known as:  Zulema Guille 2 Sprays by Both Nostrils route daily. 2 Spray  
    
   
   
   
  
 furosemide 40 mg tablet Commonly known as:  LASIX Take 1 Tab by mouth daily for 30 days. 40 mg  
    
  
   
   
   
  
 glyBURIDE 5 mg tablet Commonly known as:  Nile Laurence Take 5 mg by mouth Daily (before breakfast). 5 mg  
    
   
   
   
  
 hydrOXYzine pamoate 50 mg capsule Commonly known as:  VISTARIL Take 50 mg by mouth every eight (8) hours as needed for Itching. Indications: Pruritus of Skin 50 mg  
    
   
   
   
  
 ketoconazole 2 % topical cream  
Commonly known as:  NIZORAL Apply  to affected area daily. LANTUS 100 unit/mL injection Generic drug:  insulin glargine 15 Units by SubCUTAneous route daily. 15 Units  
    
   
   
   
  
 lovastatin 40 mg tablet Commonly known as:  MEVACOR Take 1 Tab by mouth nightly. 40 mg Nebulizer & Compressor machine 1 Each by Does Not Apply route daily as needed. 1 Each  
    
   
   
   
  
 nitroglycerin 0.4 mg SL tablet Commonly known as:  NITROSTAT  
   
 1 Tab by SubLINGual route every five (5) minutes as needed for Chest Pain (call 911 if not relieved by 3). 0.4 mg  
    
   
   
   
  
 traMADol 50 mg tablet Commonly known as:  ULTRAM  
   
 Take 1 Tab by mouth every eight (8) hours as needed. Max Daily Amount: 150 mg.  
 50 mg  
    
   
   
   
  
  
STOP taking these medications   
 cyclobenzaprine 10 mg tablet Commonly known as:  FLEXERIL  
   
  
 lisinopril 40 mg tablet Commonly known as:  Estefania Horton Where to Get Your Medications Information on where to get these meds will be given to you by the nurse or doctor. ! Ask your nurse or doctor about these medications  
  albuterol 2.5 mg /3 mL (0.083 %) nebulizer solution  
 furosemide 40 mg tablet  
 metoprolol tartrate 25 mg tablet

## 2018-01-29 NOTE — IP AVS SNAPSHOT
[address] [facility]. 
[phone] Patient: Lisa Thomas MRN: [id number] FWB:[id number] About your hospitalization You were admitted on:  January 30, 2018 You last received care in the:  [facility] 2 PROGRESSIVE CARE You were discharged on:  February 3, 2018 Why you were hospitalized Your primary diagnosis was:  Not on File Your diagnoses also included:  Acute On Chronic Respiratory Failure With Hypoxia And Hypercapnia (Hcc), Acute On Chronic Diastolic Heart Failure (Hcc), Obesity Hypoventilation Syndrome (Hcc), Svt (Supraventricular Tachycardia) (Hcc), Coronary Atherosclerosis Of Native Coronary Artery, Noncompliance With Therapeutic Plan, Copd (Chronic Obstructive Pulmonary Disease) (Hcc), Htn (Hypertension), Gangrenous Toe (Hcc) Follow-up Information Follow up With Details Comments Contact Info Dequan Hanson MD  PCP - Visiting Physicians - follow up in 1-2 weeks. [address] [facility] office will contact you to set up appointment. [address] [facility] [address] [place] [address] 
[phone] Jagruti Andrews MD  Sleep Doctor - needs appointment to assess and evaluate for trilogy needs as soon as possible [address] [place] [address] [address] [place] [address] 
[phone] [facility] On 2/4/2018 [facility] will call on 2/3/18 to confirm appointment date and set time. [phone] Normal  Is your oxygen provider [phone] [facility]  Will be providing your home health [phone] [facility], [address]  You inquired about admission from home to rehab. [facility] would evaluate and assess at time of need. Please call if you are interested in this. [address] [place] [address] 
[phone] [address]  This is your agency that provides personal care giver [address], [place], [address] [place] [address] [phone] Discharge Orders None A check edwina indicates which time of day the medication should be taken. My Medications CHANGE how you take these medications Instructions Each Dose to Equal  
 Morning Noon Evening Bedtime  
 metoprolol tartrate 25 mg tablet Commonly known as:  LOPRESSOR What changed:  how much to take Take 0.5 Tabs by mouth two (2) times a day for 30 days. 12.5 mg  
    
  
   
   
   
  
 nystatin topical cream  
Commonly known as:  MYCOSTATIN What changed:  Another medication with the same name was removed. Continue taking this medication, and follow the directions you see here. Apply  to affected area two (2) times daily as needed for Skin Irritation or Itching. CONTINUE taking these medications Instructions Each Dose to Equal  
 Morning Noon Evening Bedtime  
 albuterol 2.5 mg /3 mL (0.083 %) nebulizer solution Commonly known as:  PROVENTIL VENTOLIN  
   
 3 mL by Nebulization route every four (4) hours as needed for Wheezing. 2.5 mg  
    
   
   
   
  
 ammonium lactate 12 % topical cream  
Commonly known as:  LAC-HYDRIN  
   
 rub in to affected area well  
     
   
   
   
  
 aspirin 81 mg chewable tablet Take 81 mg by mouth daily. 81 mg  
    
  
   
   
   
  
 cetirizine 10 mg tablet Commonly known as:  ZYRTEC Take 10 mg by mouth daily as needed for Allergies. 10 mg  
    
   
   
   
  
 fluticasone 50 mcg/actuation nasal spray Commonly known as:  Theadore Sarkis 2 Sprays by Both Nostrils route daily. 2 Spray  
    
   
   
   
  
 furosemide 40 mg tablet Commonly known as:  LASIX Take 1 Tab by mouth daily for 30 days. 40 mg  
    
  
   
   
   
  
 glyBURIDE 5 mg tablet Commonly known as:  Janelle Giancarlo Take 5 mg by mouth Daily (before breakfast). 5 mg  
    
   
   
   
  
 hydrOXYzine pamoate 50 mg capsule Commonly known as:  VISTARIL Take 50 mg by mouth every eight (8) hours as needed for Itching. Indications: Pruritus of Skin 50 mg  
    
   
   
   
  
 ketoconazole 2 % topical cream  
Commonly known as:  NIZORAL Apply  to affected area daily. LANTUS 100 unit/mL injection Generic drug:  insulin glargine 15 Units by SubCUTAneous route daily. 15 Units  
    
   
   
   
  
 lovastatin 40 mg tablet Commonly known as:  MEVACOR Take 1 Tab by mouth nightly. 40 mg Nebulizer & Compressor machine 1 Each by Does Not Apply route daily as needed. 1 Each  
    
   
   
   
  
 nitroglycerin 0.4 mg SL tablet Commonly known as:  NITROSTAT  
   
 1 Tab by SubLINGual route every five (5) minutes as needed for Chest Pain (call 911 if not relieved by 3). 0.4 mg  
    
   
   
   
  
 traMADol 50 mg tablet Commonly known as:  ULTRAM  
   
 Take 1 Tab by mouth every eight (8) hours as needed. Max Daily Amount: 150 mg.  
 50 mg  
    
   
   
   
  
  
STOP taking these medications   
 cyclobenzaprine 10 mg tablet Commonly known as:  FLEXERIL  
   
  
 lisinopril 40 mg tablet Commonly known as:  Yvette Casiano Where to Get Your Medications Information on where to get these meds will be given to you by the nurse or doctor. ! Ask your nurse or doctor about these medications  
  albuterol 2.5 mg /3 mL (0.083 %) nebulizer solution  
 furosemide 40 mg tablet  
 metoprolol tartrate 25 mg tablet Discharge Instructions HOSPITALIST DISCHARGE INSTRUCTIONS 
 
NAME: Jeremie Jim :  1970 MRN:  [id number] Date/Time:  2/3/2018 8:50 AM 
 
ADMIT DATE: 2018 DISCHARGE DATE: 2/3/2018 Attending Physician: Berlin Adams MD 
 
DISCHARGE DIAGNOSIS: 
Acute on chronic hypoxic/hypercarbic respiratory failure, pulmonary HTN, obesity hypoventilation and GEN/noncompliance with trilogy - Baseline O2 6 LPM 
Diabetes Benign HTN Lymphedema with chronic left leg wound COPD without acute exacerbation Supermorbid obesity (body mass index is 64.05 kg/m2) MEDICATIONS: 
See above · It is important that you take the medication exactly as they are prescribed. · Keep your medication in the bottles provided by the pharmacist and keep a list of the medication names, dosages, and times to be taken in your wallet. · Do not take other medications without consulting your doctor. Pain Management: per above medications What to do at [facility] Recommended diet:  Resume previous diet Recommended activity: Activity as tolerated If you have questions regarding the hospital related prescriptions or hospital related issues please call [facility] at . You can always direct your questions to your primary care doctor if you are unable to reach your hospital physician; your PCP works as an extension of your hospital doctor just like your hospital doctor is an extension of your PCP for your time at [facility]. If you experience any of the following symptoms then please call your primary care physician or return to the emergency room if you cannot get hold of your doctor: 
Fever, chills, nausea, vomiting, diarrhea, change in mentation, falling, bleeding, shortness of breath Additional Instructions: 
 
 
Bring these papers with you to your follow up appointments. The papers will help your doctors be sure to continue the care plan from the hospital. 
 
 
 
 
 
 
Information obtained by : 
I understand that if any problems occur once I am at home I am to contact my physician. I understand and acknowledge receipt of the instructions indicated above. Physician's or R.N.'s Signature                                                                  Date/Time Patient or Representative Signature                                                          Date/Time Learning About CPAP/Trilogy for Sleep Apnea What is CPAP/Trilogy? CPAP is a small machine that you use at home every night while you sleep. It increases air pressure in your throat to keep your airway open. When you have sleep apnea, this can help you sleep better so you feel much better. CPAP stands for \"continuous positive airway pressure. \" The CPAP machine will have one of the following: · A mask that covers your nose and mouth · Prongs that fit into your nose · A mask that covers your nose only, the most common type. This type is called NCPAP. The N stands for \"nasal.\" Why is it done? CPAP is usually the best treatment for obstructive sleep apnea. It is the first treatment choice and the most widely used. Your doctor may suggest CPAP if you have: · Moderate to severe sleep apnea. · Sleep apnea and coronary artery disease (CAD) or heart failure. How does it help? · CPAP can help you have more normal sleep, so you feel less sleepy and more alert during the daytime. · CPAP may help keep heart failure or other heart problems from getting worse. · CPAP may help lower your blood pressure. · If you use CPAP, your bed partner may also sleep better because you are not snoring or restless. What are the side effects? Some people who use CPAP have: · A dry or stuffy nose and a sore throat. · Irritated skin on the face. · Sore eyes. · Bloating. If you have any of these problems, work with your doctor to fix them. Here are some things you can try: · Be sure the mask or nasal prongs fit well. · See if your doctor can adjust the pressure of your CPAP. · If your nose is dry, try a humidifier. · If your nose is runny or stuffy, try decongestant medicine or a steroid nasal spray. Be safe with medicines. Read and follow all instructions on the label. Do not use the medicine longer than the label says. If these things do not help, you might try a different type of machine. Some machines have air pressure that adjusts on its own. Others have air pressures that are different when you breathe in than when you breathe out. This may reduce discomfort caused by too much pressure in your nose. Where can you learn more? Go to http://cassia-donte.info/. Enter T035 in the search box to learn more about \"Learning About CPAP for Sleep Apnea. \" Current as of: May 12, 2017 Content Version: 11.4 © 1980-0518 [employer]. Care instructions adapted under license by [employer] (which disclaims liability or warranty for this information). If you have questions about a medical condition or this instruction, always ask your healthcare professional. [place] [address] any warranty or liability for your use of this information. Body Mass Index: Care Instructions Your Care Instructions Body mass index (BMI) can help you see if your weight is raising your risk for health problems. It uses a formula to compare how much you weigh with how tall you are. · A BMI lower than 18.5 is considered underweight. · A BMI between 18.5 and 24.9 is considered healthy. · A BMI between 25 and 29.9 is considered overweight. A BMI of 30 or higher is considered obese. If your BMI is in the normal range, it means that you have a lower risk for weight-related health problems. If your BMI is in the overweight or obese range, you may be at increased risk for weight-related health problems, such as high blood pressure, heart disease, stroke, arthritis or joint pain, and diabetes.  If your BMI is in the underweight range, you may be at increased risk for health problems such as fatigue, lower protection (immunity) against illness, muscle loss, bone loss, hair loss, and hormone problems. BMI is just one measure of your risk for weight-related health problems. You may be at higher risk for health problems if you are not active, you eat an unhealthy diet, or you drink too much alcohol or use tobacco products. Follow-up care is a key part of your treatment and safety. Be sure to make and go to all appointments, and call your doctor if you are having problems. It's also a good idea to know your test results and keep a list of the medicines you take. How can you care for yourself at home? · Practice healthy eating habits. This includes eating plenty of fruits, vegetables, whole grains, lean protein, and low-fat dairy. · If your doctor recommends it, get more exercise. Walking is a good choice. Bit by bit, increase the amount you walk every day. Try for at least 30 minutes on most days of the week. · Do not smoke. Smoking can increase your risk for health problems. If you need help quitting, talk to your doctor about stop-smoking programs and medicines. These can increase your chances of quitting for good. · Limit alcohol to 2 drinks a day for men and 1 drink a day for women. Too much alcohol can cause health problems. If you have a BMI higher than 25 · Your doctor may do other tests to check your risk for weight-related health problems. This may include measuring the distance around your waist. A waist measurement of more than 40 inches in men or 35 inches in women can increase the risk of weight-related health problems. · Talk with your doctor about steps you can take to stay healthy or improve your health. You may need to make lifestyle changes to lose weight and stay healthy, such as changing your diet and getting regular exercise. If you have a BMI lower than 18.5 · Your doctor may do other tests to check your risk for health problems. · Talk with your doctor about steps you can take to stay healthy or improve your health. You may need to make lifestyle changes to gain or maintain weight and stay healthy, such as getting more healthy foods in your diet and doing exercises to build muscle. Where can you learn more? Go to http://cassia-donte.info/. Enter S176 in the search box to learn more about \"Body Mass Index: Care Instructions. \" Current as of: October 13, 2016 Content Version: 11.4 © 9755-0342 Eponym. Care instructions adapted under license by [employer] (which disclaims liability or warranty for this information). If you have questions about a medical condition or this instruction, always ask your healthcare professional. [place] [address] any warranty or liability for your use of this information. SonoPlot Announcement We are excited to announce that we are making your provider's discharge notes available to you in SonoPlot. You will see these notes when they are completed and signed by the physician that discharged you from your recent hospital stay. If you have any questions or concerns about any information you see in SonoPlot, please call the Health Information Department where you were seen or reach out to your Primary Care Provider for more information about your plan of care. Introducing [facility]! [employer] introduces SonoPlot patient portal. Now you can access parts of your medical record, email your doctor's office, and request medication refills online. 1. In your internet browser, go to [email or web address] 2. Click on the First Time User? Click Here link in the Sign In box. You will see the New Member Sign Up page. 3. Enter your SonoPlot Access Code exactly as it appears below.  You will not need to use this code after youve completed the sign-up process. If you do not sign up before the expiration date, you must request a new code. · OjoOido-Academics Access Code: [email or web address] Expires: 4/15/2018  4:29 PM 
 
4. Enter the last four digits of your Social Security Number (xxxx) and Date of Birth (mm/dd/yyyy) as indicated and click Submit. You will be taken to the next sign-up page. 5. Create a OjoOido-Academics ID. This will be your OjoOido-Academics login ID and cannot be changed, so think of one that is secure and easy to remember. 6. Create a Ojo[employer]ido-Academ[employer] password. You can change your password at any time. 7. Enter your Password Reset Question and Answer. This can be used at a later time if you forget your password. 8. Enter your e-mail address. You will receive e-mail notification when new information is available in [address] 9. Click Sign Up. You can now view and download portions of your medical record. 10. Click the Download Summary menu link to download a portable copy of your medical information. If you have questions, please visit the Frequently Asked Questions section of the OjoOido-Academics website. Remember, O[employer]ics is NOT to be used for urgent needs. For medical emergencies, dial 911. Now available from your iPhone and Android! Unresulted Labs-Please follow up with your PCP about these lab tests Order Current Status CULTURE, BLOOD Preliminary result CULTURE, BLOOD Preliminary result Providers Seen During Your Hospitalization Provider Specialty Primary office phone Radha Erickson MD Emergency Medicine [phone] Parvez Son MD Internal Medicine [phone] Immunizations Administered for This Admission Name Date Influenza Vaccine (Quad) PF  Deferred () Your Primary Care Physician (PCP) Primary Care Physician Office Phone Office Fax Carol Ferrer [phone] You are allergic to the following No active allergies Recent Documentation Height Weight Breastfeeding? BMI OB Status Smoking Status 1.676 m (!) 180 kg No 64.05 kg/m2 Injection Current Every Day Smoker Emergency Contacts Name Discharge Info Relation Home Work Mobile Edwar Ro N/A  AT THIS TIME [6] Child [2] [phone] Ceicle Cervantes N/A  AT THIS TIME [6] Friend [5] [phone] Patient Belongings The following personal items are in your possession at time of discharge: 
  Dental Appliances: None  Visual Aid: Glasses      Home Medications: None   Jewelry: None  Clothing: Shirt, Shorts, Slippers, Socks, With patient    Other Valuables: At bedside, Cell Phone Please provide this summary of care documentation to your next provider. Signatures-by signing, you are acknowledging that this After Visit Summary has been reviewed with you and you have received a copy. Patient Signature:  ____________________________________________________________ Date:  ____________________________________________________________  
  
[facility] Officer Provider Signature:  ____________________________________________________________ Date:  ____________________________________________________________

## 2018-01-30 PROBLEM — J96.21 ACUTE ON CHRONIC RESPIRATORY FAILURE WITH HYPOXIA AND HYPERCAPNIA (HCC): Status: ACTIVE | Noted: 2018-01-30

## 2018-01-30 PROBLEM — J96.22 ACUTE ON CHRONIC RESPIRATORY FAILURE WITH HYPOXIA AND HYPERCAPNIA (HCC): Status: ACTIVE | Noted: 2018-01-30

## 2018-01-30 LAB
ALBUMIN SERPL-MCNC: 3.2 G/DL (ref 3.5–5)
ALBUMIN/GLOB SERPL: 0.6 {RATIO} (ref 1.1–2.2)
ALP SERPL-CCNC: 100 U/L (ref 45–117)
ALT SERPL-CCNC: 27 U/L (ref 12–78)
ANION GAP SERPL CALC-SCNC: 3 MMOL/L (ref 5–15)
ARTERIAL PATENCY WRIST A: YES
AST SERPL-CCNC: 18 U/L (ref 15–37)
ATRIAL RATE: 84 BPM
BASE EXCESS BLDA CALC-SCNC: 6.5 MMOL/L
BASE EXCESS BLDA CALC-SCNC: 7.3 MMOL/L
BASE EXCESS BLDA CALC-SCNC: 9.3 MMOL/L
BASOPHILS # BLD: 0 K/UL (ref 0–0.1)
BASOPHILS NFR BLD: 0 % (ref 0–1)
BDY SITE: ABNORMAL
BILIRUB SERPL-MCNC: 0.4 MG/DL (ref 0.2–1)
BNP SERPL-MCNC: 378 PG/ML (ref 0–125)
BREATHS.SPONTANEOUS ON VENT: 22
BREATHS.SPONTANEOUS ON VENT: 22
BREATHS.SPONTANEOUS ON VENT: 27
BUN SERPL-MCNC: 19 MG/DL (ref 6–20)
BUN/CREAT SERPL: 22 (ref 12–20)
CALCIUM SERPL-MCNC: 9.6 MG/DL (ref 8.5–10.1)
CALCULATED P AXIS, ECG09: 74 DEGREES
CALCULATED R AXIS, ECG10: 97 DEGREES
CALCULATED T AXIS, ECG11: 70 DEGREES
CHLORIDE SERPL-SCNC: 100 MMOL/L (ref 97–108)
CK MB CFR SERPL CALC: NORMAL % (ref 0–2.5)
CK MB SERPL-MCNC: <1 NG/ML (ref 5–25)
CK SERPL-CCNC: 30 U/L (ref 26–192)
CO2 SERPL-SCNC: 36 MMOL/L (ref 21–32)
CREAT SERPL-MCNC: 0.88 MG/DL (ref 0.55–1.02)
DIAGNOSIS, 93000: NORMAL
DIFFERENTIAL METHOD BLD: ABNORMAL
EOSINOPHIL # BLD: 0 K/UL (ref 0–0.4)
EOSINOPHIL NFR BLD: 0 % (ref 0–7)
EPAP/CPAP/PEEP, PAPEEP: 6
EPAP/CPAP/PEEP, PAPEEP: 6
ERYTHROCYTE [DISTWIDTH] IN BLOOD BY AUTOMATED COUNT: 13.2 % (ref 11.5–14.5)
FIO2 ON VENT: 40 %
FIO2 ON VENT: 50 %
GAS FLOW.O2 O2 DELIVERY SYS: 5 L/MIN
GLOBULIN SER CALC-MCNC: 5.2 G/DL (ref 2–4)
GLUCOSE BLD STRIP.AUTO-MCNC: 102 MG/DL (ref 65–100)
GLUCOSE BLD STRIP.AUTO-MCNC: 197 MG/DL (ref 65–100)
GLUCOSE BLD STRIP.AUTO-MCNC: 82 MG/DL (ref 65–100)
GLUCOSE SERPL-MCNC: 109 MG/DL (ref 65–100)
HCO3 BLDA-SCNC: 36 MMOL/L (ref 22–26)
HCO3 BLDA-SCNC: 36 MMOL/L (ref 22–26)
HCO3 BLDA-SCNC: 40 MMOL/L (ref 22–26)
HCT VFR BLD AUTO: 48.7 % (ref 35–47)
HGB BLD-MCNC: 14.6 G/DL (ref 11.5–16)
IMM GRANULOCYTES # BLD: 0 K/UL (ref 0–0.04)
IMM GRANULOCYTES NFR BLD AUTO: 0 % (ref 0–0.5)
IPAP/PIP, IPAPIP: 12
LACTATE SERPL-SCNC: 0.6 MMOL/L (ref 0.4–2)
LYMPHOCYTES # BLD: 0.8 K/UL (ref 0.8–3.5)
LYMPHOCYTES NFR BLD: 10 % (ref 12–49)
MCH RBC QN AUTO: 28.3 PG (ref 26–34)
MCHC RBC AUTO-ENTMCNC: 30 G/DL (ref 30–36.5)
MCV RBC AUTO: 94.6 FL (ref 80–99)
MONOCYTES # BLD: 1 K/UL (ref 0–1)
MONOCYTES NFR BLD: 11 % (ref 5–13)
NEUTS SEG # BLD: 6.7 K/UL (ref 1.8–8)
NEUTS SEG NFR BLD: 78 % (ref 32–75)
NRBC # BLD: 0 K/UL (ref 0–0.01)
NRBC BLD-RTO: 0 PER 100 WBC
P-R INTERVAL, ECG05: 168 MS
PCO2 BLDA: 71 MMHG (ref 35–45)
PCO2 BLDA: 78 MMHG (ref 35–45)
PCO2 BLDA: 85 MMHG (ref 35–45)
PH BLDA: 7.29 [PH] (ref 7.35–7.45)
PH BLDA: 7.29 [PH] (ref 7.35–7.45)
PH BLDA: 7.33 [PH] (ref 7.35–7.45)
PLATELET # BLD AUTO: 131 K/UL (ref 150–400)
PMV BLD AUTO: 10.9 FL (ref 8.9–12.9)
PO2 BLDA: 52 MMHG (ref 80–100)
PO2 BLDA: 67 MMHG (ref 80–100)
PO2 BLDA: 85 MMHG (ref 80–100)
POTASSIUM SERPL-SCNC: 4.3 MMOL/L (ref 3.5–5.1)
PRESSURE SUPPORT SETTING VENT: 6 CM[H2O]
PROT SERPL-MCNC: 8.4 G/DL (ref 6.4–8.2)
Q-T INTERVAL, ECG07: 362 MS
QRS DURATION, ECG06: 90 MS
QTC CALCULATION (BEZET), ECG08: 427 MS
RBC # BLD AUTO: 5.15 M/UL (ref 3.8–5.2)
SAO2 % BLD: 81 % (ref 92–97)
SAO2 % BLD: 91 % (ref 92–97)
SAO2 % BLD: 95 % (ref 92–97)
SAO2% DEVICE SAO2% SENSOR NAME: ABNORMAL
SERVICE CMNT-IMP: ABNORMAL
SERVICE CMNT-IMP: ABNORMAL
SERVICE CMNT-IMP: NORMAL
SODIUM SERPL-SCNC: 139 MMOL/L (ref 136–145)
SPECIMEN SITE: ABNORMAL
TROPONIN I SERPL-MCNC: <0.04 NG/ML
VENTILATION MODE VENT: ABNORMAL
VENTILATION MODE VENT: ABNORMAL
VENTRICULAR RATE, ECG03: 84 BPM
VT SETTING VENT: 500 ML
WBC # BLD AUTO: 8.5 K/UL (ref 3.6–11)

## 2018-01-30 PROCEDURE — 94660 CPAP INITIATION&MGMT: CPT

## 2018-01-30 PROCEDURE — 77010033678 HC OXYGEN DAILY

## 2018-01-30 PROCEDURE — 96374 THER/PROPH/DIAG INJ IV PUSH: CPT

## 2018-01-30 PROCEDURE — 77030012879 HC MSK CPAP FLL FAC PHIL -B

## 2018-01-30 PROCEDURE — 36600 WITHDRAWAL OF ARTERIAL BLOOD: CPT | Performed by: INTERNAL MEDICINE

## 2018-01-30 PROCEDURE — 94640 AIRWAY INHALATION TREATMENT: CPT

## 2018-01-30 PROCEDURE — 83605 ASSAY OF LACTIC ACID: CPT | Performed by: EMERGENCY MEDICINE

## 2018-01-30 PROCEDURE — 77030029684 HC NEB SM VOL KT MONA -A

## 2018-01-30 PROCEDURE — 93005 ELECTROCARDIOGRAM TRACING: CPT

## 2018-01-30 PROCEDURE — 74011250637 HC RX REV CODE- 250/637: Performed by: INTERNAL MEDICINE

## 2018-01-30 PROCEDURE — 74011000250 HC RX REV CODE- 250: Performed by: INTERNAL MEDICINE

## 2018-01-30 PROCEDURE — 82962 GLUCOSE BLOOD TEST: CPT

## 2018-01-30 PROCEDURE — 74011250636 HC RX REV CODE- 250/636: Performed by: INTERNAL MEDICINE

## 2018-01-30 PROCEDURE — 65660000000 HC RM CCU STEPDOWN

## 2018-01-30 PROCEDURE — 74011000250 HC RX REV CODE- 250: Performed by: EMERGENCY MEDICINE

## 2018-01-30 PROCEDURE — 94664 DEMO&/EVAL PT USE INHALER: CPT

## 2018-01-30 PROCEDURE — 94002 VENT MGMT INPAT INIT DAY: CPT

## 2018-01-30 PROCEDURE — 74011636637 HC RX REV CODE- 636/637: Performed by: INTERNAL MEDICINE

## 2018-01-30 PROCEDURE — 87040 BLOOD CULTURE FOR BACTERIA: CPT | Performed by: EMERGENCY MEDICINE

## 2018-01-30 PROCEDURE — 74011250636 HC RX REV CODE- 250/636

## 2018-01-30 RX ORDER — TRAMADOL HYDROCHLORIDE 50 MG/1
50 TABLET ORAL
Status: DISCONTINUED | OUTPATIENT
Start: 2018-01-30 | End: 2018-02-03 | Stop reason: HOSPADM

## 2018-01-30 RX ORDER — ALBUTEROL SULFATE 0.83 MG/ML
2.5 SOLUTION RESPIRATORY (INHALATION)
Status: DISCONTINUED | OUTPATIENT
Start: 2018-01-30 | End: 2018-02-03 | Stop reason: HOSPADM

## 2018-01-30 RX ORDER — FLUTICASONE PROPIONATE 50 MCG
2 SPRAY, SUSPENSION (ML) NASAL DAILY
Status: DISCONTINUED | OUTPATIENT
Start: 2018-01-30 | End: 2018-02-03 | Stop reason: HOSPADM

## 2018-01-30 RX ORDER — INSULIN LISPRO 100 [IU]/ML
INJECTION, SOLUTION INTRAVENOUS; SUBCUTANEOUS
Status: DISCONTINUED | OUTPATIENT
Start: 2018-01-30 | End: 2018-02-03 | Stop reason: HOSPADM

## 2018-01-30 RX ORDER — PANTOPRAZOLE SODIUM 40 MG/1
40 TABLET, DELAYED RELEASE ORAL DAILY
Status: DISCONTINUED | OUTPATIENT
Start: 2018-01-30 | End: 2018-02-03 | Stop reason: HOSPADM

## 2018-01-30 RX ORDER — SODIUM CHLORIDE 0.9 % (FLUSH) 0.9 %
5-10 SYRINGE (ML) INJECTION EVERY 8 HOURS
Status: DISCONTINUED | OUTPATIENT
Start: 2018-01-30 | End: 2018-02-03 | Stop reason: HOSPADM

## 2018-01-30 RX ORDER — MAGNESIUM SULFATE 100 %
4 CRYSTALS MISCELLANEOUS AS NEEDED
Status: DISCONTINUED | OUTPATIENT
Start: 2018-01-30 | End: 2018-02-03 | Stop reason: HOSPADM

## 2018-01-30 RX ORDER — AMMONIUM LACTATE 12 G/100G
LOTION TOPICAL DAILY
Status: DISCONTINUED | OUTPATIENT
Start: 2018-01-30 | End: 2018-01-31

## 2018-01-30 RX ORDER — IPRATROPIUM BROMIDE AND ALBUTEROL SULFATE 2.5; .5 MG/3ML; MG/3ML
3 SOLUTION RESPIRATORY (INHALATION)
Status: DISCONTINUED | OUTPATIENT
Start: 2018-01-30 | End: 2018-01-31

## 2018-01-30 RX ORDER — FUROSEMIDE 10 MG/ML
INJECTION INTRAMUSCULAR; INTRAVENOUS
Status: COMPLETED
Start: 2018-01-30 | End: 2018-01-30

## 2018-01-30 RX ORDER — GUAIFENESIN 100 MG/5ML
81 LIQUID (ML) ORAL DAILY
COMMUNITY

## 2018-01-30 RX ORDER — SODIUM CHLORIDE 0.9 % (FLUSH) 0.9 %
5-10 SYRINGE (ML) INJECTION AS NEEDED
Status: DISCONTINUED | OUTPATIENT
Start: 2018-01-30 | End: 2018-02-03 | Stop reason: HOSPADM

## 2018-01-30 RX ORDER — FUROSEMIDE 10 MG/ML
40 INJECTION INTRAMUSCULAR; INTRAVENOUS
Status: DISCONTINUED | OUTPATIENT
Start: 2018-01-30 | End: 2018-02-01

## 2018-01-30 RX ORDER — DEXTROSE 50 % IN WATER (D50W) INTRAVENOUS SYRINGE
12.5-25 AS NEEDED
Status: DISCONTINUED | OUTPATIENT
Start: 2018-01-30 | End: 2018-02-03 | Stop reason: HOSPADM

## 2018-01-30 RX ORDER — IPRATROPIUM BROMIDE AND ALBUTEROL SULFATE 2.5; .5 MG/3ML; MG/3ML
3 SOLUTION RESPIRATORY (INHALATION)
Status: COMPLETED | OUTPATIENT
Start: 2018-01-30 | End: 2018-01-30

## 2018-01-30 RX ORDER — ENOXAPARIN SODIUM 100 MG/ML
40 INJECTION SUBCUTANEOUS EVERY 12 HOURS
Status: DISCONTINUED | OUTPATIENT
Start: 2018-01-30 | End: 2018-02-03 | Stop reason: HOSPADM

## 2018-01-30 RX ORDER — FUROSEMIDE 10 MG/ML
40 INJECTION INTRAMUSCULAR; INTRAVENOUS 2 TIMES DAILY
Status: DISCONTINUED | OUTPATIENT
Start: 2018-01-30 | End: 2018-01-30

## 2018-01-30 RX ORDER — INSULIN GLARGINE 100 [IU]/ML
40 INJECTION, SOLUTION SUBCUTANEOUS DAILY
COMMUNITY
End: 2019-01-03 | Stop reason: CLARIF

## 2018-01-30 RX ORDER — ASPIRIN 81 MG/1
81 TABLET ORAL DAILY
Status: DISCONTINUED | OUTPATIENT
Start: 2018-01-30 | End: 2018-02-03 | Stop reason: HOSPADM

## 2018-01-30 RX ORDER — FUROSEMIDE 10 MG/ML
40 INJECTION INTRAMUSCULAR; INTRAVENOUS
Status: COMPLETED | OUTPATIENT
Start: 2018-01-30 | End: 2018-01-30

## 2018-01-30 RX ORDER — ALBUTEROL SULFATE 0.83 MG/ML
2.5 SOLUTION RESPIRATORY (INHALATION)
Status: DISCONTINUED | OUTPATIENT
Start: 2018-01-30 | End: 2018-01-30

## 2018-01-30 RX ORDER — METOPROLOL TARTRATE 25 MG/1
25 TABLET, FILM COATED ORAL 2 TIMES DAILY
Status: DISCONTINUED | OUTPATIENT
Start: 2018-01-30 | End: 2018-01-31

## 2018-01-30 RX ORDER — ACETAMINOPHEN 325 MG/1
650 TABLET ORAL
Status: DISCONTINUED | OUTPATIENT
Start: 2018-01-30 | End: 2018-02-03 | Stop reason: HOSPADM

## 2018-01-30 RX ORDER — NYSTATIN 100000 [USP'U]/G
POWDER TOPICAL 2 TIMES DAILY
Status: DISCONTINUED | OUTPATIENT
Start: 2018-01-30 | End: 2018-02-01

## 2018-01-30 RX ORDER — ONDANSETRON 2 MG/ML
4 INJECTION INTRAMUSCULAR; INTRAVENOUS
Status: DISCONTINUED | OUTPATIENT
Start: 2018-01-30 | End: 2018-02-03 | Stop reason: HOSPADM

## 2018-01-30 RX ADMIN — FUROSEMIDE 40 MG: 10 INJECTION, SOLUTION INTRAMUSCULAR; INTRAVENOUS at 17:22

## 2018-01-30 RX ADMIN — FUROSEMIDE 40 MG: 10 INJECTION INTRAMUSCULAR; INTRAVENOUS at 01:37

## 2018-01-30 RX ADMIN — IPRATROPIUM BROMIDE AND ALBUTEROL SULFATE 3 ML: .5; 3 SOLUTION RESPIRATORY (INHALATION) at 00:17

## 2018-01-30 RX ADMIN — ALBUTEROL SULFATE 2.5 MG: 2.5 SOLUTION RESPIRATORY (INHALATION) at 05:25

## 2018-01-30 RX ADMIN — ENOXAPARIN SODIUM 40 MG: 40 INJECTION SUBCUTANEOUS at 09:01

## 2018-01-30 RX ADMIN — Medication: at 12:35

## 2018-01-30 RX ADMIN — FLUTICASONE PROPIONATE 2 SPRAY: 50 SPRAY, METERED NASAL at 15:42

## 2018-01-30 RX ADMIN — INSULIN LISPRO 2 UNITS: 100 INJECTION, SOLUTION INTRAVENOUS; SUBCUTANEOUS at 17:22

## 2018-01-30 RX ADMIN — ALBUTEROL SULFATE 2.5 MG: 2.5 SOLUTION RESPIRATORY (INHALATION) at 19:26

## 2018-01-30 RX ADMIN — Medication 10 ML: at 21:38

## 2018-01-30 RX ADMIN — FUROSEMIDE 40 MG: 10 INJECTION, SOLUTION INTRAMUSCULAR; INTRAVENOUS at 09:03

## 2018-01-30 RX ADMIN — METOPROLOL TARTRATE 25 MG: 25 TABLET ORAL at 17:22

## 2018-01-30 RX ADMIN — FUROSEMIDE 40 MG: 10 INJECTION, SOLUTION INTRAMUSCULAR; INTRAVENOUS at 01:37

## 2018-01-30 RX ADMIN — TRAMADOL HYDROCHLORIDE 50 MG: 50 TABLET, FILM COATED ORAL at 21:39

## 2018-01-30 RX ADMIN — NYSTATIN: 100000 POWDER TOPICAL at 12:35

## 2018-01-30 RX ADMIN — ALBUTEROL SULFATE 2.5 MG: 2.5 SOLUTION RESPIRATORY (INHALATION) at 08:00

## 2018-01-30 RX ADMIN — Medication 10 ML: at 15:42

## 2018-01-30 RX ADMIN — ASPIRIN 81 MG: 81 TABLET, COATED ORAL at 09:01

## 2018-01-30 RX ADMIN — METOPROLOL TARTRATE 25 MG: 25 TABLET ORAL at 09:01

## 2018-01-30 RX ADMIN — PANTOPRAZOLE SODIUM 40 MG: 40 TABLET, DELAYED RELEASE ORAL at 09:01

## 2018-01-30 RX ADMIN — NYSTATIN: 100000 POWDER TOPICAL at 21:37

## 2018-01-30 RX ADMIN — IPRATROPIUM BROMIDE AND ALBUTEROL SULFATE 3 ML: .5; 3 SOLUTION RESPIRATORY (INHALATION) at 14:31

## 2018-01-30 RX ADMIN — ENOXAPARIN SODIUM 40 MG: 40 INJECTION SUBCUTANEOUS at 19:46

## 2018-01-30 NOTE — PROGRESS NOTES
Hospitalist Progress Note    NAME: Tory Murdock   :  1970   MRN:  [id number]       Assessment / Plan:  Acute respiratory distress with acute on chronic combined hypoxic/hypercarbic respiratory failure, pulmonary HTN, obesity hypoventilation and ongoing GEN/noncompliance with CPAP: Baseline O2 5 LPM;  Pt says that she still has not been using her trilogy machine after recent discharge  - CXR with mild pulmonary edema versus interstitial pneumonia. New since 2 days ago. - echo  with EF 55-60%. Suboptimal endocardial visualization limits wall motion analysis. E/o pulmonary HTN on CT chest last admission.  - aggressive diuresis with IV lasix (on lasix 40mg po at home)  - ABG with worsening CO2 retention, pulmonology consulted. Have ordered repeat ABG at noon. I reordered her BiPAP using prior settings from Dr. Jackeline Packer recommendations last week in the interim. - change nebs to duonebs scheduled with prn albuterol  - PT/OT evals requested, I have told patient that she cannot discharge directly home this time due to 4 ER visits in the last 1 1/2 weeks. Noninsulin dependent DM2 controlled without complication:  - holding home glyburide while NPO  - prn lispro sliding scale  Benign HTN:  - holding lisinopril. Con't home metoprolol.  - prn nitrbid  Lymphedema with chronic LLE wound:  lac-hydrin ordered  COPD without acute exacerbation: nebs as above. Supermorbid obesity (body mass index is 64.56 kg/(m^2)     Code Status: Full  DVT Prophylaxis: lovenox     Subjective:     Chief Complaint / Reason for Physician Visit  \"I want to eat\". Discussed with RN events overnight.      Review of Systems:  Symptom Y/N Comments  Symptom Y/N Comments   Fever/Chills n   Chest Pain y    Poor Appetite n   Edema n    Cough n   Abdominal Pain n    Sputum n   Joint Pain     SOB/RAZO y   Pruritis/Rash     Nausea/vomit    Tolerating PT/OT     Diarrhea    Tolerating Diet     Constipation    Other       Could NOT obtain due to:      Objective:     VITALS:   Last 24hrs VS reviewed since prior progress note. Most recent are:  Patient Vitals for the past 24 hrs:   Temp Pulse Resp BP SpO2   01/30/18 0804 - (!) 127 (!) 33 - 93 %   01/30/18 0800 - (!) 116 21 - (!) 86 %   01/30/18 0754 - (!) 116 22 - (!) 84 %   01/30/18 0738 - (!) 116 27 - (!) 83 %   01/30/18 0731 - (!) 121 26 - 90 %   01/30/18 0719 - - - 121/66 -   01/30/18 0645 - 100 20 - 90 %   01/30/18 0525 - 94 - - -   01/30/18 0445 - 94 17 - 99 %   01/30/18 0408 - 96 17 - 100 %   01/30/18 0330 - - - - 97 %   01/30/18 0326 - - - - 97 %   01/30/18 0300 - 87 18 130/72 93 %   01/30/18 0245 - 95 27 127/55 90 %   01/30/18 0158 - 96 18 - 97 %   01/30/18 0137 - 92 - 134/88 -   01/30/18 0019 - 98 20 - 94 %   01/30/18 0015 - 89 20 102/41 -   01/29/18 2255 - 99 26 (!) 144/96 (!) 86 %   01/29/18 2131 98.4 °F (36.9 °C) (!) 107 22 (!) 177/91 (!) 86 %   01/29/18 2130 - - - - (!) 87 %     No intake or output data in the 24 hours ending 01/30/18 0817     PHYSICAL EXAM:  General: Obese. Alert, cooperative, no acute distress    EENT:  EOMI. Anicteric sclerae. MMM  Resp:  CTA bilaterally, no wheezing or rales but very poor air movement. No accessory muscle use  CV:  Regular rhythm,  2+ edema  GI:  Soft, moderately distended, Non tender.  +Bowel sounds  Neurologic:  Alert and oriented X 3, normal speech,   Psych:   Poor insight. Not anxious nor agitated  Skin:  No rashes.   No jaundice    Reviewed most current lab test results and cultures  YES  Reviewed most current radiology test results   YES  Review and summation of old records today    NO  Reviewed patient's current orders and MAR    YES  PMH/ reviewed - no change compared to H&P  ________________________________________________________________________  Care Plan discussed with:    Comments   Patient x    Family      RN     Care Manager     Consultant                        Multidiciplinary team rounds were held today with , nursing, pharmacist and clinical coordinator. Patient's plan of care was discussed; medications were reviewed and discharge planning was addressed. ________________________________________________________________________  Total NON critical care TIME:  35 Minutes    Total CRITICAL CARE TIME Spent:   Minutes non procedure based      Comments   >50% of visit spent in counseling and coordination of care x    ________________________________________________________________________  Woo Camacho MD     Procedures: see electronic medical records for all procedures/Xrays and details which were not copied into this note but were reviewed prior to creation of Plan. LABS:  I reviewed today's most current labs and imaging studies.   Pertinent labs include:  Recent Labs      01/29/18   2332   WBC  8.5   HGB  14.6   HCT  48.7*   PLT  131*     Recent Labs      01/29/18   2355   NA  139   K  4.3   CL  100   CO2  36*   GLU  109*   BUN  19   CREA  0.88   CA  9.6   ALB  3.2*   TBILI  0.4   SGOT  18   ALT  27       Signed: Woo Camacho MD

## 2018-01-30 NOTE — ED NOTES
TRANSFER - OUT REPORT:    Verbal report given to Ramona Silva (name) on Lola Hall  being transferred to pcu(unit) for routine progression of care       Report consisted of patients Situation, Background, Assessment and   Recommendations(SBAR). Information from the following report(s) SBAR, Kardex, ED Summary and MAR was reviewed with the receiving nurse. Lines:   Peripheral IV 01/29/18 Right Antecubital (Active)   Site Assessment Clean, dry, & intact 1/29/2018 11:36 PM   Phlebitis Assessment 0 1/29/2018 11:36 PM   Infiltration Assessment 0 1/29/2018 11:36 PM   Dressing Status Clean, dry, & intact 1/29/2018 11:36 PM   Dressing Type Transparent 1/29/2018 11:36 PM   Hub Color/Line Status Flushed 1/29/2018 11:36 PM        Opportunity for questions and clarification was provided. Patient transported with:   Monitor. BIPAP/RT, pt nasal spray from pharmacy, labels, chart, pt bag of belongings with jacket, pt glasses and cell phone on person.

## 2018-01-30 NOTE — CONSULTS
[facility] Consult Service Note  Pulmonary, Critical Care, and Sleep Medicine    Name: Todd Butler MRN: [id number]   : 1970 Hospital: [facility]   Date: 2018   Hospital Day: 2       Subjective/Interval History:   I have reviewed the notes from other providers and old records readily available and summarized findings below with the flowsheet. Seen earlier today on rounds. IMPRESSION:   1. Acute on Chronic hypoxic/hypercapnic respiratory failure on home O2  2. GEN/OHS - followed by Dr. Becerra, not compliant with her home Trilogy (currently attributes this to not liking her full face mask)- yet to see him in followup  3. COPD/asthma - no prior PFTs, no evidence of exacerbation with exacerbation   4. CAD- h/o stents  5. HTN  6. DM  7. Active Tobacco use  8. H/o SVT (supraventricular tachycardia)on BB  9. HTN  10. DM 2 controlled without complication: glyburide   11. Lymphedema with chronic LLE wound:  lac-hydrin ordered  12. Supermorbid obesity (body mass index is 64.56 kg/(m^2) at very high risk for volume overload and fluid retention- looking back on weights; one year ago PCO2 usually less than 60; does better when weight less than 400 pounds; as much as 445 last Summer  13. Prognosis guarded. 14. Discussed with nurse this am.       RECOMMENDATIONS/PLAN:   1. Pt is frustrated due to lack of safety net-  2. No means to bring her Trilogy into hospital- ideally she should be on her home Trilogy and we might be able to adjust mask for her and desensitizer her here  3. She needs to contact her sleep doctor (Dr. Micah Brice)   4. In the meantime, would maintain using our BIPAP machine AVAPS, , Pmax 50, PS max 20, PS min 10, EPAPmax 10, EPAP min 4, with 6 LPM O2 bled in (or 35% FiO2 on our machines)  5. Recommended better compliance to reduce hospitalizations and help with edema  6. Mobilize  7. Follow ABG  8.  Tobacco cessation counseling  9. Little else to add at this time. 10. Replete K  11. Weight reduction counseling  12. Pt education- tough social situation but told her that her wieght must saty below 400 and she must use her NIV device as much as possible 8+ hours or more to help keep PCO2 from climbing otherwise risk coma, death, CVA, MI  15. Case management help with transportation, home health, etc  14. Prescription drug management with home med reconciliation done          My assessment/ and management was discussed with:  nursing    respiratory therapy Dr.   family      Pt's condition is acute and unstable requiring inpatient hospitalization. This care involved high complexity decision making which includes independently reviewing the patient's past medical records, current laboratory results, medication profiles that were immediately available to me and actual Xray images at the bedside in order to assess, support vital system function, and to treat this degree of vital organ system failure, and to prevent further deterioration of the patients condition. Risk of deterioration: high   [x] High complexity decision making was performed  [x] See my orders for details      Subjective/Initial History:     I was asked by Ashtyn Covington MD to see Carroll Corado in consultation for a chief complaint of acute on chronic respiratory failure now on BIPAP    Patient is a 41 y.o. female smoker with GEN/OHS, follows with Dr. Ag Musa of [facility]. She is noncompliant with her BiPAP and now Trilogy. She was recently started on nocturnal noninvasive ventilation, but she has not been compliant because she does not like the mask she was given. Per pt records, she was admitted to the hospital from 1/20/18 - 1/24/18 for COPD and seen once per  prior to her discharge. Pt was seen on 01/26 for similar symptoms and discharged home. Pt was given a dose of steroids yesterday while in [facility] ED and by [employer] today.   She carries a diagnosis of COPD/asthma and was followed with Dr. Micky Hayes for years, but she has never had PFTs. No wheezing at this time. Seen by Dr. Nicole Olivier. For history of CAD including  of LAD, REED to [facility] in the past, but EKG, enzymes and echo normal. Per EMS pt wears 5 L O2 at baseline at home. Per patient, she is been feeling short of breath for past couple of weeks, worsen with exertion. She also reported mild productive cough with clear sputum. Pt denies any fever, chills, nausea, vomiting, diarrhea, problems urination. In ED pt noted to be hypoxic on her 5L O2 and ABgs showed respiratory acidosis. Pt admits not having worn her trilogy at home. Does not her or our machine. Has a part time aide but no transportation. Does not like lying in bed. Back sore. Had no means to get her medications after last discharge. Reivew of chart notes PCO 60's one year ago June 2017 and weight was as high as 445 then. Weight as low as 390 a few weeks ago now up again to 400 pounds.        No Known Allergies     MAR reviewed and pertinent medications noted or modified as needed   Current Facility-Administered Medications   Medication    sodium chloride (NS) flush 5-10 mL    aspirin delayed-release tablet 81 mg    fluticasone (FLONASE) 50 mcg/actuation nasal spray 2 Spray    insulin lispro (HUMALOG) injection    glucose chewable tablet 16 g    dextrose (D50W) injection syrg 12.5-25 g    glucagon (GLUCAGEN) injection 1 mg    metoprolol tartrate (LOPRESSOR) tablet 25 mg    pantoprazole (PROTONIX) tablet 40 mg    traMADol (ULTRAM) tablet 50 mg    nitroglycerin (NITROBID) 2 % ointment 1 Inch    sodium chloride (NS) flush 5-10 mL    sodium chloride (NS) flush 5-10 mL    acetaminophen (TYLENOL) tablet 650 mg    ondansetron (ZOFRAN) injection 4 mg    enoxaparin (LOVENOX) injection 40 mg    furosemide (LASIX) injection 40 mg    albuterol (PROVENTIL VENTOLIN) nebulizer solution 2.5 mg    albuterol-ipratropium (DUO-NEB) 2.5 MG-0.5 MG/3 ML    ammonium lactate (LAC-HYDRIN) 12 % lotion    nystatin (MYCOSTATIN) 100,000 unit/gram powder      PMH:  has a past medical history of Arthritis; Asthma; CAD (coronary artery disease); Congestive heart failure ([facility] COPD (chronic obstructive pulmonary disease) ([facility] Diabetes ([facility] Hypertension; Morbid obesity with BMI of 70 and over, adult [facility]); NSTEMI (non-ST elevated myocardial infarction) ([facility] and SVT (supraventricular tachycardia) ([facility] PSH:   has a past surgical history that includes hx orthopaedic; hx other surgical; hx  section; and pr cardiac surg procedure unlist.   FHX: family history includes Heart Disease in her father, mother, and sister. SHX:  reports that she has been smoking Cigarettes. She has been smoking about 0.25 packs per day. She has never used smokeless tobacco. She reports that she does not drink alcohol or use illicit drugs. ROS:A comprehensive review of systems was negative except for that written in the HPI. Objective:     Vital Signs: Intake/Output: Intake/Output:   Temp (24hrs), Av.5 °F (36.9 °C), Min:98.4 °F (36.9 °C), Max:98.6 °F (37 °C)   Visit Vitals    BP 98/68 (BP 1 Location: Left arm, BP Patient Position: At rest)    Pulse 99    Temp 98.6 °F (37 °C)    Resp 30    Ht 5' 6\" (1.676 m)    Wt (!) 181.4 kg (400 lb)    SpO2 94%    BMI 64.56 kg/m2        Telemetry:    normal sinus rhythm O2 Device: BIPAP  O2 Flow Rate (L/min): 5 l/min  Wt Readings from Last 4 Encounters:   18 (!) 181.4 kg (400 lb)   18 (!) 177.4 kg (391 lb)   18 (!) 177.9 kg (392 lb 2 oz)   17 (!) 201.9 kg (445 lb)        No intake or output data in the 24 hours ending 18 1032  Last shift:         Last 3 shifts:       Physical Exam:    General: alert and moderately ill AAF   HEAD: Normocephalic, without obvious abnormality, atraumatic   EYES: conjunctivae clear.  PERRL,  AN Icteric sclerae   NOSE: nares normal, no drainage, no nasal flaring,    THROAT: ; Lips, mucosa dry; No Thrush; class  airway; dentures;  tongue midline   Neck: Supple, symmetrical, trachea midline,  No accessory mm use; No Stridor/ cuff leak, No goiter or thyroid tenderness   LYMPH:  in neck or groin   Chest: increased AP diameter   Lungs: decreased air exchange bilaterally   Heart: Regular rate and rhythm; severe edema   Abdomen: soft, non-tender, without masses or organomegaly, OBESE   : deferred; Extremity: negative, clubbing; no joint swelling or erythema   Neuro: alert; speech fluent ;verbal; withdraws to pain; unable to check gait and station   Psych: oriented to time, place and person; easily agititated   Skin: Warm; thicked, brawny, verucous changes BLE   Pulses:Bilateral, Radial, 2+   Capillary refill: normal; well perfused,    Data:     All lab results for the last 24 hours reviewed.              Labs:    Recent Labs      01/29/18   2332   WBC  8.5   HGB  14.6   PLT  131*     Recent Labs      01/30/18   0126  01/29/18   2355   NA   --   139   K   --   4.3   CL   --   100   CO2   --   36*   GLU   --   109*   BUN   --   19   CREA   --   0.88   CA   --   9.6   LAC  0.6   --    ALB   --   3.2*   SGOT   --   18   ALT   --   27     Recent Labs      01/30/18   0857  01/29/18   2332  01/27/18   1624   PH  7.29*  7.29*  7.33*   PCO2  85*  78*  73*   PO2  85  52*  66*   HCO3  40*  36*  37*   FIO2  50   --    --      Recent Labs      01/29/18   2355   CPK  30   CKNDX  Cannot be calculated   TROIQ  <0.04     Lab Results   Component Value Date/Time    BNP 33 01/12/2016 02:54 AM      Lab Results   Component Value Date/Time    Culture result: NO GROWTH AFTER 5 HOURS 01/30/2018 01:47 AM    Culture result: NO GROWTH AFTER 5 HOURS 01/30/2018 01:26 AM    Culture result: MODERATE NORMAL RESPIRATORY AMRIK 09/24/2017 10:56 PM    Culture result: LIGHT ACINETOBACTER BAUMANNII/HAEMOLYTICUS 09/24/2017 10:56 PM    Culture result: SCANT SERRATIA MARCESCENS 09/24/2017 10:56 PM Lab Results   Component Value Date/Time    Vancomycin,trough 8.3 01/11/2016 10:16 AM    Vancomycin,trough 14.3 05/27/2015 12:03 PM    CK 30 01/29/2018 11:55 PM    CK 22 06/25/2017 03:25 PM     Lab Results   Component Value Date/Time    Color YELLOW/STRAW 01/21/2018 09:18 AM    Appearance CLEAR 01/21/2018 09:18 AM    Specific gravity 1.010 05/26/2015 02:43 AM    pH (UA) 6.0 01/21/2018 09:18 AM    Protein NEGATIVE  01/21/2018 09:18 AM    Glucose NEGATIVE  01/21/2018 09:18 AM    Ketone NEGATIVE  01/21/2018 09:18 AM    Bilirubin NEGATIVE  01/21/2018 09:18 AM    Blood NEGATIVE  01/21/2018 09:18 AM    Urobilinogen 1.0 01/21/2018 09:18 AM    Nitrites NEGATIVE  01/21/2018 09:18 AM    Leukocyte Esterase NEGATIVE  01/21/2018 09:18 AM    WBC 0-4 01/21/2018 09:18 AM    RBC 0-5 01/21/2018 09:18 AM    Bacteria NEGATIVE  01/21/2018 09:18 AM       No results found for: TOXA1, RPR, HBCM, HBSAG, HAAB, HCAB1, HAAT, G6PD, CRYAC, HIVGT, HIVR, HIV1, HIV12, HIVPC, HIVRPI  No results found for: IRON, FE, TIBC, IBCT, PSAT, FERR  Lab Results   Component Value Date/Time    TSH 1.79 11/13/2014 12:00 AM       Imaging:          Results from [place] encounter on 01/29/18   XR CHEST PA LAT   Narrative EXAM:  XR CHEST PA LAT    INDICATION:  Shortness of breath, chronic COPD. COMPARISON: Portable chest on 1/27/2018    TECHNIQUE: PA and lateral chest views    FINDINGS: Cardiac monitoring wires overlie the thorax. Mild cardiomegaly is  unchanged. Aortic contours are within normal limits. The pulmonary vasculature  is within normal limits. There is evidence of mild pulmonary edema. No lobar pneumonia or pneumothorax. Probable small bilateral pleural effusions. The visualized bones and upper  abdomen are age-appropriate. Impression IMPRESSION:    Mild pulmonary edema versus interstitial pneumonia. New since 2 days ago.             Results from [place] encounter on 01/20/18   CTA CHEST W OR W WO CONT   Narrative INDICATION:   eval for PE chest pain    COMPARISON: 2017    TECHNIQUE:   Precontrast  images were obtained to localize the volume for acquisition. Multislice helical CT arteriography was performed from the diaphragm to the  thoracic inlet during uneventful rapid bolus intravenous administration of 100  mL of Isovue-370. Lung and soft tissue windows were generated. Post processing  was performed and coronal reformatted images were also generated. 3 D imaging  was performed. CT dose reduction was achieved through use of a standardized  protocol tailored for this examination and automatic exposure control for dose  modulation. FINDINGS:  Study is limited by patient's body habitus. Thyroid gland is unremarkable. Main pulmonary artery is enlarged measuring 4 cm. Thoracic aorta is within  normal limits. No mediastinal or hilar adenopathy identified. Study is limited by patient's body habitus. No definite pulmonary identified. There is suspicion of scattered small airspace opacities in the upper lobes  bilaterally right greater than left. Impression IMPRESSION:   1. There is prominence of the main pulmonary artery may represent pulmonary  artery hypertension. 2. Study is compromised by patient's body habitus but no definite pulmonary  emboli identified. 3. There are small scattered airspace opacities in the upper lobes bilaterally  which was present on a CT dated 9/22/2017. .            I have personally and independently reviewed the patients interval and diagnostic data, radiographs and have reviewed the reports. I have ordered additional labs to follow the current medical conditions and to monitor treatment responses over the next 24 hours or sooner if needed. If the pt needs,  additional imaging will be obtained to follow longitudinal changes found on the most current imaging. Thank you for allowing us to participate in the care of this patient.   We will be happy to follow with you.    Reta Sahni MD

## 2018-01-30 NOTE — ED NOTES
Pt requesting a \"break\" from bipap and water. This nurse advised pt to wait until orders can be checked and RT notification of pt needing break. This nurse went back into room and pt was sitting on side of bed with N/C in place, however 02 was not turned on. Educated pt the importance of complying with the hospital rules. 02 placed to 5L and 02 is now at 83%. Deep breathing exercises instructed to pt. Pt not compliant. Sitting on side of bed, requesting water. I informed pt we do not have any diet orders at time. Pt upset due to this nurse only gave pt a \"cup\" of water. Educated pt that we need a diet order and I am looking out for her safety.

## 2018-01-30 NOTE — ED NOTES
Bedside shift change report given to [address]  (oncoming nurse) by Scott GREEN (offgoing nurse). Report included the following information SBAR, Kardex, ED Summary and MAR. Pt resting in bed with bipap in place alert and verbal, hard to hear pt speaking due to bipap.

## 2018-01-30 NOTE — PROGRESS NOTES
PCU SHIFT NURSING NOTE    Shift Summary:   1300: Pt being taken off Bipap per Dr. Isael Gomez. Will page Dr. Kee Cabrera to question diet status. 1315: Spoke with Dr. Kee Cabrera regarding pt being off bipap and having cardiac diet order. Pt up in recliner at this time.

## 2018-01-30 NOTE — ED NOTES
RT obtained ABG, AM meds in the pyxis administered. Nasal spray to go with pt upstairs for administration and BS check to be done upstairs.

## 2018-01-30 NOTE — H&P
Hospitalist Admission Note    NAME:  Kidney   :  1970   MRN:  [id number]     Date/Time:  2018 2:30 AM    Patient PCP: Felisha Da Silva MD  ________________________________________________________________________    My assessment of this patient's clinical condition and my plan of care is as follows. Assessment / Plan:  Acute on chronic respiratory failure with hypoxia and hypercapnia POA (baseline 5L o2)  Due to Acute on chronic diastolic heart failure  In settings of GEN / Obesity hypoventilation syndrome / COPD / morbid Obsety and non compliance / Chronic Lymph edema with dermal changes  Admit to PCU  BiPAP support  Lasix 40mg IV BID  Recent 2D echo with normal EF but poor visualizations of valves due to morbid obesity  Monitor I/Os, daily weight and lytes  Repeat ABgs in few hours  Pulmonary consult  Frequent admissions, palliative care has seen her in the past  CXR with pulmonary edema  Schaduled nebs    H/o SVT (supraventricular tachycardia)  Continue BB    HTN  Continue BB but hold ACE inh while aggressive diuresed  PRN nitropaste    DM 2  Hold PO meds  SSI for now    Coronary atherosclerosis of native coronary artery   Continue ASa    Code Status: Full  Surrogate Decision Maker: Son    DVT Prophylaxis: Lovenox    Baseline: limited function due to morbid obesity        Subjective:   CHIEF COMPLAINT: shortness of breath    HISTORY OF PRESENT ILLNESS:     Subha Bennett is a 41 y.o.  female who presents with shortness of breath. Per patient, she is been feeling short of breath for past couple of weeks, worsen with exertion. She also reported mild productive cough with clear sputum. Pt denies any fever, chills, nausea, vomiting, diarrhea, problems urination. In ED pt noted to be hypoxic on her 5L O2 and ABgs showed respiratory acidosis. We were asked to admit for work up and evaluation of the above problems.      Past Medical History:   Diagnosis Date    Arthritis     Asthma     CAD (coronary artery disease)     Congestive heart failure (HCC)     [facility]     Diabetes ([facility]     Hypertension     Morbid obesity with BMI of 70 and over, adult ([facility]     NSTEMI (non-ST elevated myocardial infarction) ([facility]     SVT (supraventricular tachycardia) ([facility]         Past Surgical History:   Procedure Laterality Date    CARDIAC SURG PROCEDURE UNLIST      Stents    HX  SECTION      HX ORTHOPAEDIC      HX OTHER SURGICAL      cyst removed from back       Social History   Substance Use Topics    Smoking status: Current Every Day Smoker     Packs/day: 0.25     Types: Cigarettes    Smokeless tobacco: Never Used      Comment: Patient states \"I  aint smoking no more d*mn cigarettes after yesterday\" 2018    Alcohol use No        Family History   Problem Relation Age of Onset    Heart Disease Mother     Heart Disease Father     Heart Disease Sister      No Known Allergies     Prior to Admission medications    Medication Sig Start Date End Date Taking? Authorizing Provider   benzonatate (TESSALON) 100 mg capsule Take 1 Cap by mouth three (3) times daily as needed for Cough for up to 7 days. 18  Joleen Guzman MD   traMADol (ULTRAM) 50 mg tablet Take 1 Tab by mouth every eight (8) hours as needed. Max Daily Amount: 150 mg. 18   Joleen Guzman MD   Nebulizer & Compressor machine 1 Each by Does Not Apply route daily as needed. 18   Joleen Guzman MD   nystatin (MYCOSTATIN) topical cream Apply  to affected area two (2) times a day. 18   Joleen Guzman MD   cyclobenzaprine (FLEXERIL) 10 mg tablet Take 10 mg by mouth two (2) times daily as needed for Muscle Spasm(s). Indications: Muscle Spasm    Historical Provider   hydrOXYzine pamoate (VISTARIL) 50 mg capsule Take 50 mg by mouth every eight (8) hours as needed for Itching.  Indications: Pruritus of Skin    Historical Provider   nystatin (MYCOSTATIN) topical cream Apply  to affected area two (2) times daily as needed for Skin Irritation or Itching. Historical Provider   fluticasone (FLONASE) 50 mcg/actuation nasal spray 2 Sprays by Both Nostrils route daily. 9/9/17   Nesha Gallardo MD   albuterol (PROVENTIL VENTOLIN) 2.5 mg /3 mL (0.083 %) nebulizer solution 3 mL by Nebulization route every four (4) hours as needed for Wheezing. 6/12/17 April N MD Patsy   furosemide (LASIX) 40 mg tablet Take 40 mg by mouth daily. Landon Raza MD   lovastatin (MEVACOR) 40 mg tablet Take 1 Tab by mouth nightly. 1/14/16   Selina Salazar NP   lisinopril (PRINIVIL, ZESTRIL) 40 mg tablet Take 40 mg by mouth daily. Historical Provider   glyBURIDE (DIABETA) 5 mg tablet Take 5 mg by mouth Daily (before breakfast). Historical Provider   cetirizine (ZYRTEC) 10 mg tablet Take 10 mg by mouth daily as needed for Allergies. Historical Provider   omeprazole (PRILOSEC) 40 mg capsule Take 40 mg by mouth daily. 7/18/15   Landon Raza MD   ketoconazole (NIZORAL) 2 % topical cream Apply  to affected area daily. Historical Provider   ammonium lactate (LAC-HYDRIN) 12 % topical cream rub in to affected area well 11/21/14   Ricardo Balckman MD   aspirin delayed-release 81 mg tablet Take 81 mg by mouth daily. Historical Provider   metoprolol (LOPRESSOR) 25 mg tablet Take 25 mg by mouth two (2) times a day. Historical Provider   nitroglycerin (NITROSTAT) 0.4 mg SL tablet 1 Tab by SubLINGual route every five (5) minutes as needed for Chest Pain (call 911 if not relieved by 3). 9/12/13   Selina Salazar NP       REVIEW OF SYSTEMS:     I am not able to complete the review of systems because:    The patient is intubated and sedated    The patient has altered mental status due to his acute medical problems    The patient has baseline aphasia from prior stroke(s)    The patient has baseline dementia and is not reliable historian    The patient is in acute medical distress and unable to provide information           Total of 12 systems reviewed as follows:       POSITIVE= underlined text  Negative = text not underlined  General:  fever, chills, sweats, generalized weakness, weight loss/gain,      loss of appetite   Eyes:    blurred vision, eye pain, loss of vision, double vision  ENT:    rhinorrhea, pharyngitis   Respiratory:   cough, sputum production, SOB, RAZO, wheezing, pleuritic pain   Cardiology:   chest pain, palpitations, orthopnea, PND, edema, syncope   Gastrointestinal:  abdominal pain , N/V, diarrhea, dysphagia, constipation, bleeding   Genitourinary:  frequency, urgency, dysuria, hematuria, incontinence   Muskuloskeletal :  arthralgia, myalgia, back pain  Hematology:  easy bruising, nose or gum bleeding, lymphadenopathy   Dermatological: rash, ulceration, pruritis, color change / jaundice  Endocrine:   hot flashes or polydipsia   Neurological:  headache, dizziness, confusion, focal weakness, paresthesia,     Speech difficulties, memory loss, gait difficulty  Psychological: Feelings of anxiety, depression, agitation    Objective:   VITALS:    Visit Vitals    /88    Pulse 96    Temp 98.4 °F (36.9 °C)    Resp 18    Ht 5' 6\" (1.676 m)    Wt (!) 181.4 kg (400 lb)    SpO2 97%    BMI 64.56 kg/m2       PHYSICAL EXAM:    General:    Alert, cooperative, no distress, appears stated age. HEENT: Atraumatic, anicteric sclerae, pink conjunctivae     No oral ulcers, mucosa moist, throat clear, dentition fair  Neck:  Supple, symmetrical,  thyroid: non tender  Lungs:   Clear to auscultation bilaterally. No Wheezing or Rhonchi. No rales. Chest wall:  No tenderness  No Accessory muscle use. Heart:   Regular  rhythm,  No  murmur   +++ edema  Abdomen:   Soft, non-tender. Not distended. Bowel sounds normal  Extremities: No cyanosis. No clubbing,      Skin turgor normal, Capillary refill normal, Radial dial pulse 2+  Skin:     Stasis dermatitis changes on both legs. Not pale. Not Jaundiced    Psych:  Good insight. Not depressed. Not anxious or agitated. Neurologic: EOMs intact. No facial asymmetry. No aphasia or slurred speech. Symmetrical strength, Sensation grossly intact. Alert and oriented X 4.     _______________________________________________________________________  Care Plan discussed with:    Comments   Patient y    Family      RN y    Care Manager                    Consultant:  tete HAWKINS physician   _______________________________________________________________________  Expected  Disposition:   Home with Family    HH/PT/OT/RN y   SNF/LTC    LUIS A    ________________________________________________________________________  TOTAL TIME:  61 Minutes    Critical Care Provided   61  Minutes non procedure based      Comments    y Reviewed previous records   >50% of visit spent in counseling and coordination of care y Discussion with patient and questions answered       ________________________________________________________________________  Signed: Lesli Harley MD    Procedures: see electronic medical records for all procedures/Xrays and details which were not copied into this note but were reviewed prior to creation of Plan. LAB DATA REVIEWED:    Recent Results (from the past 24 hour(s))   CBC WITH AUTOMATED DIFF    Collection Time: 01/29/18 11:32 PM   Result Value Ref Range    WBC 8.5 3.6 - 11.0 K/uL    RBC 5.15 3.80 - 5.20 M/uL    HGB 14.6 11.5 - 16.0 g/dL    HCT 48.7 (H) 35.0 - 47.0 %    MCV 94.6 80.0 - 99.0 FL    MCH 28.3 26.0 - 34.0 PG    MCHC 30.0 30.0 - 36.5 g/dL    RDW 13.2 11.5 - 14.5 %    PLATELET 560 (L) 494 - 400 K/uL    MPV 10.9 8.9 - 12.9 FL    NRBC 0.0 0  WBC    ABSOLUTE NRBC 0.00 0.00 - 0.01 K/uL    NEUTROPHILS 78 (H) 32 - 75 %    LYMPHOCYTES 10 (L) 12 - 49 %    MONOCYTES 11 5 - 13 %    EOSINOPHILS 0 0 - 7 %    BASOPHILS 0 0 - 1 %    IMMATURE GRANULOCYTES 0 0.0 - 0.5 %    ABS. NEUTROPHILS 6.7 1.8 - 8.0 K/UL    ABS.  LYMPHOCYTES 0.8 0.8 - 3.5 K/UL ABS. MONOCYTES 1.0 0.0 - 1.0 K/UL    ABS. EOSINOPHILS 0.0 0.0 - 0.4 K/UL    ABS. BASOPHILS 0.0 0.0 - 0.1 K/UL    ABS. IMM. GRANS. 0.0 0.00 - 0.04 K/UL    DF AUTOMATED     BLOOD GAS, ARTERIAL    Collection Time: 01/29/18 11:32 PM   Result Value Ref Range    pH 7.29 (L) 7.35 - 7.45      PCO2 78 (H) 35.0 - 45.0 mmHg    PO2 52 (L) 80 - 100 mmHg    O2 SAT 81 (L) 92 - 97 %    BICARBONATE 36 (H) 22 - 26 mmol/L    BASE EXCESS 6.5 mmol/L    O2 METHOD NASAL O2      O2 FLOW RATE 5.00 L/min    SPONTANEOUS RATE 22.0      Sample source ARTERIAL      SITE RIGHT RADIAL      PARUL'S TEST YES     METABOLIC PANEL, COMPREHENSIVE    Collection Time: 01/29/18 11:55 PM   Result Value Ref Range    Sodium 139 136 - 145 mmol/L    Potassium 4.3 3.5 - 5.1 mmol/L    Chloride 100 97 - 108 mmol/L    CO2 36 (H) 21 - 32 mmol/L    Anion gap 3 (L) 5 - 15 mmol/L    Glucose 109 (H) 65 - 100 mg/dL    BUN 19 6 - 20 MG/DL    Creatinine 0.88 0.55 - 1.02 MG/DL    BUN/Creatinine ratio 22 (H) 12 - 20      GFR est AA >60 >60 ml/min/1.73m2    GFR est non-AA >60 >60 ml/min/1.73m2    Calcium 9.6 8.5 - 10.1 MG/DL    Bilirubin, total 0.4 0.2 - 1.0 MG/DL    ALT (SGPT) 27 12 - 78 U/L    AST (SGOT) 18 15 - 37 U/L    Alk.  phosphatase 100 45 - 117 U/L    Protein, total 8.4 (H) 6.4 - 8.2 g/dL    Albumin 3.2 (L) 3.5 - 5.0 g/dL    Globulin 5.2 (H) 2.0 - 4.0 g/dL    A-G Ratio 0.6 (L) 1.1 - 2.2     NT-PRO BNP    Collection Time: 01/29/18 11:55 PM   Result Value Ref Range    NT pro- (H) 0 - 125 PG/ML   TROPONIN I    Collection Time: 01/29/18 11:55 PM   Result Value Ref Range    Troponin-I, Qt. <0.04 <0.05 ng/mL   CK W/ CKMB & INDEX    Collection Time: 01/29/18 11:55 PM   Result Value Ref Range    CK 30 26 - 192 U/L    CK - MB <1.0 <3.6 NG/ML    CK-MB Index Cannot be calculated 0 - 2.5     LACTIC ACID    Collection Time: 01/30/18  1:26 AM   Result Value Ref Range    Lactic acid 0.6 0.4 - 2.0 MMOL/L

## 2018-01-30 NOTE — ED NOTES
RT called for ABG and noncompliance. Pt needs to have bipap in place for atleast 30 minutes to get an accurate reading.

## 2018-01-30 NOTE — ED NOTES
Bedside shift change report given to [address] [place] [address] Jamison. RN (oncoming nurse) by Cristina Cast RN (offgoing nurse). Report included the following information SBAR, Kardex, ED Summary, MAR, Recent Results and Cardiac Rhythm sinus tach.

## 2018-01-30 NOTE — ED NOTES
Assumed care of patient from EMS. Patient is A&Ox4, respirations tacypneic and unlabored. Pt. States she has been SOB and had her utilities checked today because she was concerned for a gas leak. Patient reports there was no leak found so she subsequently came to the ED. Patient has chronic COPD and is prescribed to wear 5lpm via NC at home. Patient reports the oxygen gives her \"a burning feeling,\" so she decreases the flow rate. Patient was wearing 2lpm today when EMS came to pick her up and her oxygen saturations were 84%. Patient appears SOB after standing from the EMS stretcher to sit on the ED bed but becomes less SOB after a rest period. Patient has a moist cough, non-productive at this time. Patient denies chest pain or abdominal pain currently. Additionally, patient is complaining of pain in her legs, specifically the left lower leg which is grossly edematous and crusted. Patient reports it is \"leaking. \"  Per patient, legs wrapped on Friday by wound care nurse. Upon unwrapping, odor is noted, however no leaking fluid found. Patient's legs appear to be similar in appearance to last visit. Pt. Placed in low bed in POC, sitting up on side of bed, side rail x1, monitor x3, call light within reach.

## 2018-01-30 NOTE — ED NOTES
Assumed care of pt from [place]., RN. Pt resting quietly and in no acute distress at this time. VSS. Call bell within reach.

## 2018-01-30 NOTE — ED PROVIDER NOTES
EMERGENCY DEPARTMENT HISTORY AND PHYSICAL EXAM      Date: 2018  Patient Name: Oren Go    History of Presenting Illness     Chief Complaint   Patient presents with    Shortness of Breath       History Provided By: Patient and EMS    HPI: Oren Go is a 41 y.o. female, pmhx MI, COPD, HTN, SVT, CHF, DM, Asthma, who presents via EMS to the ED c/o persistent, chronic shortness of breath and burning chest pain x3 weeks. Pt notes her shortness of breath progressively worsened about 1810 with the onset of associated nausea and headache today prompting the visit to the ED. Pt reports using 2 breathing treatments at home today without relief of sxs. Per EMS, pt was found at 84% on 2L NC and received one breathing treatment en route. Of note, the pt is prescribed to be using 5L NC at home, but reports to only be using 2L because she does not like the way 5L makes her feel. Per chart review, the pt has been seen in the ED 2 in the past 3 days for her chronic sxs. Pt also c/o a chronic left leg wound with associated subjective leakage s/p previous infection, that has been treated. Pt states wound care has changed her dressing x2 times, noting the last change to be on . Pt specifically denies any fever, congestion, abdominal pain, nausea, vomiting, diarrhea, dysuria, or urinary frequency. PCP: Audley Fabry, MD   Cardiology: Avni Hernandez MD    PMHx: Significant for MI, COPD, HTN, SVT, CHF, DM, Asthma  PSHx: Significant for  Section, Cardiac stent  Social Hx: -tobacco (former), -EtOH, -Illicit Drugs     There are no other complaints, changes, or physical findings at this time.      Current Facility-Administered Medications   Medication Dose Route Frequency Provider Last Rate Last Dose    sodium chloride (NS) flush 5-10 mL  5-10 mL IntraVENous PRN Madison Coppola MD        furosemide (LASIX) injection 40 mg  40 mg IntraVENous BID Patricia Wells MD        albuterol (PROVENTIL VENTOLIN) nebulizer solution 2.5 mg  2.5 mg Nebulization Q4H RT Benjy Castanon MD   2.5 mg at 01/30/18 5617    aspirin delayed-release tablet 81 mg  81 mg Oral DAILY Benjy Castanon MD        fluticasone (FLONASE) 50 mcg/actuation nasal spray 2 Spray  2 Spray Both Nostrils DAILY Benjy Castanon MD        insulin lispro (HUMALOG) injection   SubCUTAneous AC&HS Dary Mata MD        glucose chewable tablet 16 g  4 Tab Oral PRN Dary MD Esperanza        dextrose (D50W) injection syrg 12.5-25 g  12.5-25 g IntraVENous PRN Dary MD Esperanza        glucagon (GLUCAGEN) injection 1 mg  1 mg IntraMUSCular PRN Dary MD Esperanza        metoprolol tartrate (LOPRESSOR) tablet 25 mg  25 mg Oral BID Dary MD Esperanza        pantoprazole (PROTONIX) tablet 40 mg  40 mg Oral DAILY Dary MD Esperanza        traMADol (ULTRAM) tablet 50 mg  50 mg Oral Q8H PRN Dary MD Esperanza        nitroglycerin (NITROBID) 2 % ointment 1 Inch  1 Inch Topical Q6H PRN Benjy Castanon MD        sodium chloride (NS) flush 5-10 mL  5-10 mL IntraVENous Q8H Benjy Castanon MD        sodium chloride (NS) flush 5-10 mL  5-10 mL IntraVENous PRN Dary Mata MD        acetaminophen (TYLENOL) tablet 650 mg  650 mg Oral Q6H PRN Benjy Castanon MD        ondansetron (ZOFRAN) injection 4 mg  4 mg IntraVENous Q4H PRN Benjy Castanon MD        enoxaparin (LOVENOX) injection 40 mg  40 mg SubCUTAneous Q12H Benjy Castanon MD         Current Outpatient Prescriptions   Medication Sig Dispense Refill    benzonatate (TESSALON) 100 mg capsule Take 1 Cap by mouth three (3) times daily as needed for Cough for up to 7 days. 30 Cap 1    traMADol (ULTRAM) 50 mg tablet Take 1 Tab by mouth every eight (8) hours as needed. Max Daily Amount: 150 mg. 15 Tab 0    Nebulizer & Compressor machine 1 Each by Does Not Apply route daily as needed. 1 Each 0    nystatin (MYCOSTATIN) topical cream Apply  to affected area two (2) times a day.  15 g 0    cyclobenzaprine (FLEXERIL) 10 mg tablet Take 10 mg by mouth two (2) times daily as needed for Muscle Spasm(s). Indications: Muscle Spasm      hydrOXYzine pamoate (VISTARIL) 50 mg capsule Take 50 mg by mouth every eight (8) hours as needed for Itching. Indications: Pruritus of Skin      nystatin (MYCOSTATIN) topical cream Apply  to affected area two (2) times daily as needed for Skin Irritation or Itching.  fluticasone (FLONASE) 50 mcg/actuation nasal spray 2 Sprays by Both Nostrils route daily. 1 Bottle 0    albuterol (PROVENTIL VENTOLIN) 2.5 mg /3 mL (0.083 %) nebulizer solution 3 mL by Nebulization route every four (4) hours as needed for Wheezing. 24 Each 0    furosemide (LASIX) 40 mg tablet Take 40 mg by mouth daily.  lovastatin (MEVACOR) 40 mg tablet Take 1 Tab by mouth nightly. 30 Tab 6    lisinopril (PRINIVIL, ZESTRIL) 40 mg tablet Take 40 mg by mouth daily.  glyBURIDE (DIABETA) 5 mg tablet Take 5 mg by mouth Daily (before breakfast).  cetirizine (ZYRTEC) 10 mg tablet Take 10 mg by mouth daily as needed for Allergies.  omeprazole (PRILOSEC) 40 mg capsule Take 40 mg by mouth daily.  ketoconazole (NIZORAL) 2 % topical cream Apply  to affected area daily.  ammonium lactate (LAC-HYDRIN) 12 % topical cream rub in to affected area well 280 g 1    aspirin delayed-release 81 mg tablet Take 81 mg by mouth daily.  metoprolol (LOPRESSOR) 25 mg tablet Take 25 mg by mouth two (2) times a day.  nitroglycerin (NITROSTAT) 0.4 mg SL tablet 1 Tab by SubLINGual route every five (5) minutes as needed for Chest Pain (call 911 if not relieved by 3).  25 Tab 2       Past History     Past Medical History:  Past Medical History:   Diagnosis Date    Arthritis     Asthma     CAD (coronary artery disease)     Congestive heart failure (HCC)     [facility]     Diabetes ([facility]     Hypertension     Morbid obesity with BMI of 70 and over, adult ([facility]     NSTEMI (non-ST elevated [facility] infarction) [facility]     SVT (supraventricular tachycardia) ([facility]        Past Surgical History:  Past Surgical History:   Procedure Laterality Date    CARDIAC SURG PROCEDURE UNLIST      Stents    HX  SECTION      HX ORTHOPAEDIC      HX OTHER SURGICAL      cyst removed from back       Family History:  Family History   Problem Relation Age of Onset    Heart Disease Mother     Heart Disease Father     Heart Disease Sister        Social History:  Social History   Substance Use Topics    Smoking status: Current Every Day Smoker     Packs/day: 0.25     Types: Cigarettes    Smokeless tobacco: Never Used      Comment: Patient states \"I  aint smoking no more d*mn cigarettes after yesterday\" 2018    Alcohol use No       Allergies:  No Known Allergies      Review of Systems   Review of Systems   Constitutional: Negative. Negative for fever. Eyes: Negative. Respiratory: Positive for shortness of breath. Cardiovascular: Positive for chest pain. Gastrointestinal: Positive for nausea. Negative for abdominal pain and vomiting. Endocrine: Negative. Genitourinary: Negative. Negative for difficulty urinating, dysuria and hematuria. Musculoskeletal: Negative. Skin: Negative. Allergic/Immunologic: Negative. Neurological: Positive for headaches. Psychiatric/Behavioral: Negative for suicidal ideas. All other systems reviewed and are negative. Physical Exam   Physical Exam   Constitutional: She is oriented to person, place, and time. She appears well-developed and well-nourished. No distress. Super morbidly obese   HENT:   Head: Normocephalic and atraumatic. Nose: Nose normal.   Eyes: Conjunctivae and EOM are normal. No scleral icterus. Neck: Normal range of motion. No tracheal deviation present. Cardiovascular: Regular rhythm, normal heart sounds and intact distal pulses. Tachycardia present. Exam reveals no friction rub. No murmur heard.   Pulmonary/Chest: Breath sounds normal. No stridor. Tachypnea noted. She is in respiratory distress. She has no wheezes. She has no rales. Abdominal: Soft. Bowel sounds are normal. She exhibits no distension. There is no tenderness. There is no rebound. Musculoskeletal: Normal range of motion. She exhibits no tenderness. Neurological: She is alert and oriented to person, place, and time. No cranial nerve deficit. Skin: Skin is warm and dry. No rash noted. She is not diaphoretic. With with lymphedema to bilateral LE's with woody and verrucous skin changes. LLE with cling gauze dressing applied. Noted to have NO drainage or erythema to area of concern noted by pt. Psychiatric: She has a normal mood and affect. Her speech is normal and behavior is normal. Judgment and thought content normal. Cognition and memory are normal.   Nursing note and vitals reviewed. Diagnostic Study Results     Labs -     Recent Results (from the past 12 hour(s))   CBC WITH AUTOMATED DIFF    Collection Time: 01/29/18 11:32 PM   Result Value Ref Range    WBC 8.5 3.6 - 11.0 K/uL    RBC 5.15 3.80 - 5.20 M/uL    HGB 14.6 11.5 - 16.0 g/dL    HCT 48.7 (H) 35.0 - 47.0 %    MCV 94.6 80.0 - 99.0 FL    MCH 28.3 26.0 - 34.0 PG    MCHC 30.0 30.0 - 36.5 g/dL    RDW 13.2 11.5 - 14.5 %    PLATELET 828 (L) 703 - 400 K/uL    MPV 10.9 8.9 - 12.9 FL    NRBC 0.0 0  WBC    ABSOLUTE NRBC 0.00 0.00 - 0.01 K/uL    NEUTROPHILS 78 (H) 32 - 75 %    LYMPHOCYTES 10 (L) 12 - 49 %    MONOCYTES 11 5 - 13 %    EOSINOPHILS 0 0 - 7 %    BASOPHILS 0 0 - 1 %    IMMATURE GRANULOCYTES 0 0.0 - 0.5 %    ABS. NEUTROPHILS 6.7 1.8 - 8.0 K/UL    ABS. LYMPHOCYTES 0.8 0.8 - 3.5 K/UL    ABS. MONOCYTES 1.0 0.0 - 1.0 K/UL    ABS. EOSINOPHILS 0.0 0.0 - 0.4 K/UL    ABS. BASOPHILS 0.0 0.0 - 0.1 K/UL    ABS. IMM.  GRANS. 0.0 0.00 - 0.04 K/UL    DF AUTOMATED     BLOOD GAS, ARTERIAL    Collection Time: 01/29/18 11:32 PM   Result Value Ref Range    pH 7.29 (L) 7.35 - 7.45      PCO2 78 (H) 35.0 - 45.0 mmHg    PO2 52 (L) 80 - 100 mmHg    O2 SAT 81 (L) 92 - 97 %    BICARBONATE 36 (H) 22 - 26 mmol/L    BASE EXCESS 6.5 mmol/L    O2 METHOD NASAL O2      O2 FLOW RATE 5.00 L/min    SPONTANEOUS RATE 22.0      Sample source ARTERIAL      SITE RIGHT RADIAL      PARUL'S TEST YES     METABOLIC PANEL, COMPREHENSIVE    Collection Time: 01/29/18 11:55 PM   Result Value Ref Range    Sodium 139 136 - 145 mmol/L    Potassium 4.3 3.5 - 5.1 mmol/L    Chloride 100 97 - 108 mmol/L    CO2 36 (H) 21 - 32 mmol/L    Anion gap 3 (L) 5 - 15 mmol/L    Glucose 109 (H) 65 - 100 mg/dL    BUN 19 6 - 20 MG/DL    Creatinine 0.88 0.55 - 1.02 MG/DL    BUN/Creatinine ratio 22 (H) 12 - 20      GFR est AA >60 >60 ml/min/1.73m2    GFR est non-AA >60 >60 ml/min/1.73m2    Calcium 9.6 8.5 - 10.1 MG/DL    Bilirubin, total 0.4 0.2 - 1.0 MG/DL    ALT (SGPT) 27 12 - 78 U/L    AST (SGOT) 18 15 - 37 U/L    Alk.  phosphatase 100 45 - 117 U/L    Protein, total 8.4 (H) 6.4 - 8.2 g/dL    Albumin 3.2 (L) 3.5 - 5.0 g/dL    Globulin 5.2 (H) 2.0 - 4.0 g/dL    A-G Ratio 0.6 (L) 1.1 - 2.2     NT-PRO BNP    Collection Time: 01/29/18 11:55 PM   Result Value Ref Range    NT pro- (H) 0 - 125 PG/ML   TROPONIN I    Collection Time: 01/29/18 11:55 PM   Result Value Ref Range    Troponin-I, Qt. <0.04 <0.05 ng/mL   CK W/ CKMB & INDEX    Collection Time: 01/29/18 11:55 PM   Result Value Ref Range    CK 30 26 - 192 U/L    CK - MB <1.0 <3.6 NG/ML    CK-MB Index Cannot be calculated 0 - 2.5     EKG, 12 LEAD, INITIAL    Collection Time: 01/30/18  1:14 AM   Result Value Ref Range    Ventricular Rate 84 BPM    Atrial Rate 84 BPM    P-R Interval 168 ms    QRS Duration 90 ms    Q-T Interval 362 ms    QTC Calculation (Bezet) 427 ms    Calculated P Axis 74 degrees    Calculated R Axis 97 degrees    Calculated T Axis 70 degrees    Diagnosis       Sinus rhythm with marked sinus arrhythmia with frequent premature ventricular   complexes  Rightward axis  Low voltage QRS  Borderline ECG  When compared with ECG of 27-JAN-2018 16:15,  premature ventricular complexes are now present     LACTIC ACID    Collection Time: 01/30/18  1:26 AM   Result Value Ref Range    Lactic acid 0.6 0.4 - 2.0 MMOL/L       Radiologic Studies -     CXR Results  (Last 48 hours)               01/30/18 0001  XR CHEST PA LAT Final result    Impression:  IMPRESSION:       Mild pulmonary edema versus interstitial pneumonia. New since 2 days ago. Narrative:  EXAM:  XR CHEST PA LAT       INDICATION:  Shortness of breath, chronic COPD. COMPARISON: Portable chest on 1/27/2018       TECHNIQUE: PA and lateral chest views       FINDINGS: Cardiac monitoring wires overlie the thorax. Mild cardiomegaly is   unchanged. Aortic contours are within normal limits. The pulmonary vasculature   is within normal limits. There is evidence of mild pulmonary edema. No lobar pneumonia or pneumothorax. Probable small bilateral pleural effusions. The visualized bones and upper   abdomen are age-appropriate. Medical Decision Making   I am the first provider for this patient. I reviewed the vital signs, available nursing notes, past medical history, past surgical history, family history and social history. Vital Signs-Reviewed the patient's vital signs.   Patient Vitals for the past 12 hrs:   Temp Pulse Resp BP SpO2   01/30/18 0645 - 100 20 - 90 %   01/30/18 0525 - 94 - - -   01/30/18 0445 - 94 17 - 99 %   01/30/18 0408 - 96 17 - 100 %   01/30/18 0330 - - - - 97 %   01/30/18 0326 - - - - 97 %   01/30/18 0300 - 87 18 130/72 93 %   01/30/18 0245 - 95 27 127/55 90 %   01/30/18 0158 - 96 18 - 97 %   01/30/18 0137 - 92 - 134/88 -   01/30/18 0019 - 98 20 - 94 %   01/30/18 0015 - 89 20 102/41 -   01/29/18 2255 - 99 26 (!) 144/96 (!) 86 %   01/29/18 2131 98.4 °F (36.9 °C) (!) 107 22 (!) 177/91 (!) 86 %   01/29/18 2130 - - - - (!) 87 %       Pulse Oximetry Analysis - 86% on 5L NC     Cardiac Monitor:   Rate: 107 bpm  Rhythm: Sinus Tachycardia      Records Reviewed: Nursing Notes, Old Medical Records, Previous electrocardiograms, Ambulance Run Sheet, Previous Radiology Studies and Previous Laboratory Studies    Provider Notes (Medical Decision Making):     DDX:  Acute on chronic respiratory failure, hypoxia, non compliant with oxygen use, pna, pulmonary edema, chf    Plan:  Chart review, ekg, cxr, abg, labs    Impression:  Acute on chronic respiratory failure    ED Course:   Initial assessment performed. The patients presenting problems have been discussed, and they are in agreement with the care plan formulated and outlined with them. I have encouraged them to ask questions as they arise throughout their visit. I reviewed our electronic medical record system for any past medical records that were available that may contribute to the patients current condition, the nursing notes and and vital signs from today's visit    Nursing notes will be reviewed as they become available in realtime while the pt has been in the ED. Kelli Renee MD      EKG interpretation 7640: NSR with pvc, R Axis, rate 84; , QRS 90, QTc 427; no acute ischemia; Kelli Renee MD    I personally reviewed pt's imaging. Official read by radiology listed below. Kelli Renee MD    PROGRESS NOTE:  11:18 PM  Pt reevaluated. Pt notes she only uses her home O2 at 2L despite being instructed to use 5L all the time. Pt notes it gives her a headache and feels like it is burning. Pt with ABG in ED on her 5L baseline and notes worsening from previous recent ED visit. Will discuss with   Kelli Renee MD    CONSULT NOTE:   12:59 AM  Kelli Renee MD spoke with Dr. Phuong Carrasco,   Specialty: Hospitalist  Discussed pt's hx, disposition, and available diagnostic and imaging results. Reviewed care plans. Consultant will evaluate pt for admission.   Written by Emiliana Castrejon ED Scribe, as dictated by Kelli Renee MD.    Disposition:    Admit Note:  12:59 AM  Pt is being admitted by Dr. Tretha Sandhoff. The results of their tests and reason(s) for their admission have been discussed with pt and/or available family. They convey agreement and understanding for the need to be admitted and for admission diagnosis. Diagnosis     Clinical Impression:   1. Acute on chronic respiratory failure with hypoxemia ([facility]        Attestations: This note is prepared by Rose Masters, acting as Scribe for MD Yaima Maguire MD : The scribe's documentation has been prepared under my direction and personally reviewed by me in its entirety. I confirm that the note above accurately reflects all work, treatment, procedures, and medical decision making performed by me. This note will not be viewable in [address]

## 2018-01-30 NOTE — PROGRESS NOTES
Pt is in ED having difficulty maintaining O2 sats and on and off BiPAP and will be admitted to PCU. Will defer and continue to follow.

## 2018-01-30 NOTE — PROGRESS NOTES
Late Note    Cm spoke with Visiting physicians office regarding pt's recent PCP visit schedule for 2/1/2018. Cm requested office to reffer pt to a pulmonologist after their visit ,left a voice message on their pt care coordinator.     Poppy Green MS  ED Case Manager   Ext [phone]

## 2018-01-30 NOTE — PROGRESS NOTES
PT consult received and chart reviewed. Reviewed recent admission therapy notes. Patient received at bedside chair. She reported being at baseline for mobility despite acute decline in pulmonary status. Patient endorses non compliance with equipment as chart details. She reports receiving new rollator device, although she had not used device as it (still) does not fit in her house. It stays on the porch where she has used it there only once just prior to recent admission as she waited for EMS. Patient declining need to participate in full evaluation with no further screening deemed necessary. Patient appears at baseline despite complex pulmonary status and social issues. She is agreeable to remaining OOB as able and mobilizing with staff ahead and appears to understand importance of such. Will sign off as no skilled PT needs indicated. Time spent: 10 minutes.      Nica Chakraborty, PT, DPT, Sandra Blackmon

## 2018-01-30 NOTE — ED NOTES
Bedside and Verbal shift change report given to Deanna Witt RN (oncoming nurse) by NORMA Rojas (offgoing nurse). Report included the following information SBAR, ED Summary, MAR and Recent Results.

## 2018-01-30 NOTE — PROGRESS NOTES
Pharmacy Medication Reconciliation     The patient was interviewed regarding current PTA medication list, use and drug allergies;  no one was present in room and didn't need to obtained permission from patient to discuss drug regimen with visitor(s) present. The patient was questioned regarding use of any other inhalers, topical products, over the counter medications, herbal medications, vitamin products or ophthalmic/nasal/otic medication use. Allergy Update: NKA    Recommendations/Findings: The following amendments were made to the patient's active medication list on file at [facility]:   1) Additions: Lantus    2) Deletions: benzonatate, omeprazole    3) Changes: ASA to chewable tablet      -Clarified PTA med list with patient and Rx Query. PTA medication list was corrected to the following:     Prior to Admission Medications   Prescriptions Last Dose Informant Patient Reported? Taking? Nebulizer & Compressor machine   No No   Si Each by Does Not Apply route daily as needed. albuterol (PROVENTIL VENTOLIN) 2.5 mg /3 mL (0.083 %) nebulizer solution   No No   Sig: 3 mL by Nebulization route every four (4) hours as needed for Wheezing. ammonium lactate (LAC-HYDRIN) 12 % topical cream   No No   Sig: rub in to affected area well   aspirin 81 mg chewable tablet   Yes Yes   Sig: Take 81 mg by mouth daily. cetirizine (ZYRTEC) 10 mg tablet   Yes No   Sig: Take 10 mg by mouth daily as needed for Allergies. cyclobenzaprine (FLEXERIL) 10 mg tablet   Yes No   Sig: Take 10 mg by mouth two (2) times daily as needed for Muscle Spasm(s). Indications: Muscle Spasm   fluticasone (FLONASE) 50 mcg/actuation nasal spray   No No   Si Sprays by Both Nostrils route daily. furosemide (LASIX) 40 mg tablet  Self Yes No   Sig: Take 40 mg by mouth daily. glyBURIDE (DIABETA) 5 mg tablet   Yes No   Sig: Take 5 mg by mouth Daily (before breakfast).    hydrOXYzine pamoate (VISTARIL) 50 mg capsule   Yes No   Sig: Take 50 mg by mouth every eight (8) hours as needed for Itching. Indications: Pruritus of Skin   insulin glargine (LANTUS) 100 unit/mL injection   Yes Yes   Sig: 15 Units by SubCUTAneous route daily. ketoconazole (NIZORAL) 2 % topical cream  Self Yes No   Sig: Apply  to affected area daily. lisinopril (PRINIVIL, ZESTRIL) 40 mg tablet   Yes No   Sig: Take 40 mg by mouth daily. lovastatin (MEVACOR) 40 mg tablet   No No   Sig: Take 1 Tab by mouth nightly. metoprolol (LOPRESSOR) 25 mg tablet   Yes No   Sig: Take 25 mg by mouth two (2) times a day. nitroglycerin (NITROSTAT) 0.4 mg SL tablet   No No   Si Tab by SubLINGual route every five (5) minutes as needed for Chest Pain (call 911 if not relieved by 3). nystatin (MYCOSTATIN) topical cream   Yes No   Sig: Apply  to affected area two (2) times daily as needed for Skin Irritation or Itching. nystatin (MYCOSTATIN) topical cream   No No   Sig: Apply  to affected area two (2) times a day. traMADol (ULTRAM) 50 mg tablet   No No   Sig: Take 1 Tab by mouth every eight (8) hours as needed. Max Daily Amount: 150 mg.       Facility-Administered Medications: None          Thank you,  Susana Norman, PHARMD

## 2018-01-30 NOTE — ED NOTES
RT and nurse at bedside educating pt on bipap importance and compliance. Pt placed back on bipap and assisted to [facility]. Pt understands blood work needs to be repeated in 30 minutes, then BS will be checked, and meal tray to be provided.   Pt upset about \"diabetic diet\"

## 2018-01-30 NOTE — PROGRESS NOTES
TRANSFER - IN REPORT:    Verbal report received from [address] [place] [address] (name) on Felicita Cheng  being received from ED (unit) for routine progression of care      Report consisted of patients Situation, Background, Assessment and   Recommendations(SBAR). Information from the following report(s) SBAR, Kardex, ED Summary, Recent Results, Med Rec Status and Cardiac Rhythm NSR w/ PVCs was reviewed with the receiving nurse. Opportunity for questions and clarification was provided. Assessment completed upon patients arrival to unit and care assumed. Primary Nurse Reji Lin RN and Yunier Dove RN performed a dual skin assessment on this patient Impairment noted- see wound doc flow sheet  Jeffrey score is 17    Large swelling, lymphedema, dry & cracked skin noted to LUIS, L>R. MD aware. Pt has been being treated outpatient for this issue.

## 2018-01-31 LAB
ALBUMIN SERPL-MCNC: 2.8 G/DL (ref 3.5–5)
ALBUMIN/GLOB SERPL: 0.7 {RATIO} (ref 1.1–2.2)
ALP SERPL-CCNC: 87 U/L (ref 45–117)
ALT SERPL-CCNC: 24 U/L (ref 12–78)
ANION GAP SERPL CALC-SCNC: 3 MMOL/L (ref 5–15)
AST SERPL-CCNC: 16 U/L (ref 15–37)
BASOPHILS # BLD: 0 K/UL (ref 0–0.1)
BASOPHILS NFR BLD: 0 % (ref 0–1)
BILIRUB SERPL-MCNC: 0.5 MG/DL (ref 0.2–1)
BUN SERPL-MCNC: 20 MG/DL (ref 6–20)
BUN/CREAT SERPL: 27 (ref 12–20)
CALCIUM SERPL-MCNC: 9.4 MG/DL (ref 8.5–10.1)
CHLORIDE SERPL-SCNC: 96 MMOL/L (ref 97–108)
CO2 SERPL-SCNC: 37 MMOL/L (ref 21–32)
CREAT SERPL-MCNC: 0.75 MG/DL (ref 0.55–1.02)
DIFFERENTIAL METHOD BLD: ABNORMAL
EOSINOPHIL # BLD: 0 K/UL (ref 0–0.4)
EOSINOPHIL NFR BLD: 1 % (ref 0–7)
ERYTHROCYTE [DISTWIDTH] IN BLOOD BY AUTOMATED COUNT: 13.3 % (ref 11.5–14.5)
GLOBULIN SER CALC-MCNC: 4.3 G/DL (ref 2–4)
GLUCOSE BLD STRIP.AUTO-MCNC: 100 MG/DL (ref 65–100)
GLUCOSE BLD STRIP.AUTO-MCNC: 106 MG/DL (ref 65–100)
GLUCOSE BLD STRIP.AUTO-MCNC: 124 MG/DL (ref 65–100)
GLUCOSE BLD STRIP.AUTO-MCNC: 163 MG/DL (ref 65–100)
GLUCOSE SERPL-MCNC: 92 MG/DL (ref 65–100)
HCT VFR BLD AUTO: 45.5 % (ref 35–47)
HGB BLD-MCNC: 13.8 G/DL (ref 11.5–16)
IMM GRANULOCYTES # BLD: 0 K/UL (ref 0–0.04)
IMM GRANULOCYTES NFR BLD AUTO: 0 % (ref 0–0.5)
LYMPHOCYTES # BLD: 1.8 K/UL (ref 0.8–3.5)
LYMPHOCYTES NFR BLD: 21 % (ref 12–49)
MCH RBC QN AUTO: 28 PG (ref 26–34)
MCHC RBC AUTO-ENTMCNC: 30.3 G/DL (ref 30–36.5)
MCV RBC AUTO: 92.5 FL (ref 80–99)
MONOCYTES # BLD: 0.9 K/UL (ref 0–1)
MONOCYTES NFR BLD: 10 % (ref 5–13)
NEUTS SEG # BLD: 6 K/UL (ref 1.8–8)
NEUTS SEG NFR BLD: 68 % (ref 32–75)
NRBC # BLD: 0 K/UL (ref 0–0.01)
NRBC BLD-RTO: 0 PER 100 WBC
PLATELET # BLD AUTO: 122 K/UL (ref 150–400)
PMV BLD AUTO: 11 FL (ref 8.9–12.9)
POTASSIUM SERPL-SCNC: 3.9 MMOL/L (ref 3.5–5.1)
PROT SERPL-MCNC: 7.1 G/DL (ref 6.4–8.2)
RBC # BLD AUTO: 4.92 M/UL (ref 3.8–5.2)
SERVICE CMNT-IMP: ABNORMAL
SERVICE CMNT-IMP: NORMAL
SODIUM SERPL-SCNC: 136 MMOL/L (ref 136–145)
WBC # BLD AUTO: 8.8 K/UL (ref 3.6–11)

## 2018-01-31 PROCEDURE — 97530 THERAPEUTIC ACTIVITIES: CPT | Performed by: OCCUPATIONAL THERAPIST

## 2018-01-31 PROCEDURE — G8987 SELF CARE CURRENT STATUS: HCPCS | Performed by: OCCUPATIONAL THERAPIST

## 2018-01-31 PROCEDURE — 65660000000 HC RM CCU STEPDOWN

## 2018-01-31 PROCEDURE — 97535 SELF CARE MNGMENT TRAINING: CPT | Performed by: OCCUPATIONAL THERAPIST

## 2018-01-31 PROCEDURE — 74011250637 HC RX REV CODE- 250/637: Performed by: INTERNAL MEDICINE

## 2018-01-31 PROCEDURE — 80053 COMPREHEN METABOLIC PANEL: CPT | Performed by: INTERNAL MEDICINE

## 2018-01-31 PROCEDURE — 74011250636 HC RX REV CODE- 250/636: Performed by: INTERNAL MEDICINE

## 2018-01-31 PROCEDURE — 97530 THERAPEUTIC ACTIVITIES: CPT | Performed by: PHYSICAL THERAPIST

## 2018-01-31 PROCEDURE — G8989 SELF CARE D/C STATUS: HCPCS | Performed by: OCCUPATIONAL THERAPIST

## 2018-01-31 PROCEDURE — 77010033678 HC OXYGEN DAILY

## 2018-01-31 PROCEDURE — 97116 GAIT TRAINING THERAPY: CPT | Performed by: PHYSICAL THERAPIST

## 2018-01-31 PROCEDURE — 74011636637 HC RX REV CODE- 636/637: Performed by: INTERNAL MEDICINE

## 2018-01-31 PROCEDURE — 36415 COLL VENOUS BLD VENIPUNCTURE: CPT | Performed by: INTERNAL MEDICINE

## 2018-01-31 PROCEDURE — 97165 OT EVAL LOW COMPLEX 30 MIN: CPT | Performed by: OCCUPATIONAL THERAPIST

## 2018-01-31 PROCEDURE — 94660 CPAP INITIATION&MGMT: CPT

## 2018-01-31 PROCEDURE — 94640 AIRWAY INHALATION TREATMENT: CPT

## 2018-01-31 PROCEDURE — 85025 COMPLETE CBC W/AUTO DIFF WBC: CPT | Performed by: INTERNAL MEDICINE

## 2018-01-31 PROCEDURE — G8988 SELF CARE GOAL STATUS: HCPCS | Performed by: OCCUPATIONAL THERAPIST

## 2018-01-31 PROCEDURE — 74011000250 HC RX REV CODE- 250: Performed by: INTERNAL MEDICINE

## 2018-01-31 PROCEDURE — 82962 GLUCOSE BLOOD TEST: CPT

## 2018-01-31 PROCEDURE — 97161 PT EVAL LOW COMPLEX 20 MIN: CPT | Performed by: PHYSICAL THERAPIST

## 2018-01-31 RX ORDER — IPRATROPIUM BROMIDE AND ALBUTEROL SULFATE 2.5; .5 MG/3ML; MG/3ML
3 SOLUTION RESPIRATORY (INHALATION)
Status: DISCONTINUED | OUTPATIENT
Start: 2018-01-31 | End: 2018-02-03 | Stop reason: HOSPADM

## 2018-01-31 RX ORDER — METOPROLOL TARTRATE 25 MG/1
12.5 TABLET, FILM COATED ORAL 2 TIMES DAILY
Status: DISCONTINUED | OUTPATIENT
Start: 2018-01-31 | End: 2018-02-03 | Stop reason: HOSPADM

## 2018-01-31 RX ORDER — AMMONIUM LACTATE 12 G/100G
LOTION TOPICAL 2 TIMES DAILY
Status: DISCONTINUED | OUTPATIENT
Start: 2018-01-31 | End: 2018-02-03 | Stop reason: HOSPADM

## 2018-01-31 RX ADMIN — METOPROLOL TARTRATE 25 MG: 25 TABLET ORAL at 08:45

## 2018-01-31 RX ADMIN — PANTOPRAZOLE SODIUM 40 MG: 40 TABLET, DELAYED RELEASE ORAL at 08:44

## 2018-01-31 RX ADMIN — IPRATROPIUM BROMIDE AND ALBUTEROL SULFATE 3 ML: .5; 3 SOLUTION RESPIRATORY (INHALATION) at 14:26

## 2018-01-31 RX ADMIN — ENOXAPARIN SODIUM 40 MG: 40 INJECTION SUBCUTANEOUS at 20:05

## 2018-01-31 RX ADMIN — AMMONIUM LACTATE: 12 LOTION TOPICAL at 17:33

## 2018-01-31 RX ADMIN — ASPIRIN 81 MG: 81 TABLET, COATED ORAL at 08:45

## 2018-01-31 RX ADMIN — Medication: at 09:10

## 2018-01-31 RX ADMIN — Medication 10 ML: at 06:40

## 2018-01-31 RX ADMIN — METOPROLOL TARTRATE 12.5 MG: 25 TABLET ORAL at 17:32

## 2018-01-31 RX ADMIN — IPRATROPIUM BROMIDE AND ALBUTEROL SULFATE 3 ML: .5; 3 SOLUTION RESPIRATORY (INHALATION) at 07:31

## 2018-01-31 RX ADMIN — FUROSEMIDE 40 MG: 10 INJECTION, SOLUTION INTRAMUSCULAR; INTRAVENOUS at 08:44

## 2018-01-31 RX ADMIN — Medication 10 ML: at 17:34

## 2018-01-31 RX ADMIN — Medication 10 ML: at 20:05

## 2018-01-31 RX ADMIN — Medication 10 ML: at 08:45

## 2018-01-31 RX ADMIN — Medication 10 ML: at 17:32

## 2018-01-31 RX ADMIN — NYSTATIN: 100000 POWDER TOPICAL at 09:09

## 2018-01-31 RX ADMIN — IPRATROPIUM BROMIDE AND ALBUTEROL SULFATE 3 ML: .5; 3 SOLUTION RESPIRATORY (INHALATION) at 01:13

## 2018-01-31 RX ADMIN — INSULIN LISPRO 2 UNITS: 100 INJECTION, SOLUTION INTRAVENOUS; SUBCUTANEOUS at 13:02

## 2018-01-31 RX ADMIN — FUROSEMIDE 40 MG: 10 INJECTION, SOLUTION INTRAMUSCULAR; INTRAVENOUS at 17:32

## 2018-01-31 RX ADMIN — FLUTICASONE PROPIONATE 2 SPRAY: 50 SPRAY, METERED NASAL at 09:10

## 2018-01-31 RX ADMIN — ENOXAPARIN SODIUM 40 MG: 40 INJECTION SUBCUTANEOUS at 08:44

## 2018-01-31 NOTE — PROGRESS NOTES
Problem: Self Care Deficits Care Plan (Adult)  Goal: *Acute Goals and Plan of Care (Insert Text)  Occupational Therapy EVALUATION/discharge  Patient: Liza Bledsoe (68 y.o. female)  Date: 1/31/2018  Primary Diagnosis: Acute on chronic respiratory failure with hypoxia and hypercapnia (HCC)        Precautions:   Contact    ASSESSMENT:   Based on the objective data described below, the patient presents with near baseline level of functioning for adls and functional mobility. Pt lives alone in a small home; her son and her nephew check on her. Since pt's home is small, she is unable to access the bathroom and uses a BSC and sponge bathes at baseline with assistance from her home aide that comes in 9 to 3. Pt does not leave her home, as she has high O2 demands at baseline 5 to 6 L (reportedly since 2012). Pt demonstrated independent functional mobility in her room this date on 6 L nasal cannula, desaturations to mid 80s. Once seated at rest post activity, O2 sats increased to 93%. When pt was talking, her O2 sats decreased to 86%, returning to 90s at rest.  Pt reports that she is very sensitive to odors/fumes which can bring on an asthma attack--perfumes and cooking odors have caused her trouble in the past with her breathing. Pt was encouraged to sit up in the chair and to perform BLE exercises and BUE exercises to maintain strength for adls. Pt demonstrates poor endurance at this time, but this is close to her baseline level of functioning. Pt will need continued assistance from her home care aide. Pt's respiratory status and obesity are limiting factors for pt that impair adls/IADLs. She sees no need for therapy at this time. .  .  Further skilled acute occupational therapy is not indicated at this time. Discharge Recommendations: will need assistance for adls (baseline) and IADLs (baseline). Further Equipment Recommendations for Discharge: may benefit from using a reacher for adls. SUBJECTIVE:   Patient stated I AM going home. .. ... I've been homeless before. .. I pay my bills.     OBJECTIVE DATA SUMMARY:   HISTORY:   Past Medical History:   Diagnosis Date    Arthritis     Asthma     CAD (coronary artery disease)     Cong[facility] heart failure ([facility]     COPD (chronic obstructive pulmonary disease) ([facility]     Diabetes ([facility]     Hypertension     Morbid obesity with BMI of 70 and over, adult ([facility]     NSTEMI (non-ST elevated myocardial infarction) ([facility]     SVT (supraventricular tachycardia) ([facility]      Past Surgical History:   Procedure Laterality Date    CARDIAC SURG PROCEDURE UNLIST      Stents    HX  SECTION      HX ORTHOPAEDIC      HX OTHER SURGICAL      cyst removed from back       Prior Level of Function/Environment/Context: Pt lives alone, her son and nephew check on her. Pt uses 5 to 6 L of O2 (since )  Pt has an aide from 9 to 3 that assists her with bathing and dressing and IADLs. Pt lives in a small home and is unable to access her bathroom - uses a BSC and sponge bathes with assist of aide. Pt sleeps in the living room (has hospital bed, but has been sleeping in recliner)  so that she is close to the door in case she has to exit. Pt reports that if she falls, she cannot get up by herself--she must call for help and keeps her phone with her. Pt must stop and rest when moving from [facility] to kitchen and has a chair in her kitchen to rest in.     Occupations in which the patient is/was successful, what are the barriers preventing that success: supermorbid obesity, respiratory status  Performance Patterns (routines, roles, habits, and rituals): sedentary, homebound due to respiratory status  Personal Interests and/or values:   Expanded or extensive additional review of patient history: 6L O2 at home since ,  Has had 4 ED visits in the last 1.5 weeks, has wound care at home, hx of lymphadema, has hx of non compliance, has refused rehab in the past (2017 declined snf rehab) ( 1/2018 declined pulmonary rehab), has portable O2 however uses a high amount of O2 that does not suffice for  Outings in the community, has an MD that sees her at home. Home Situation  Home Environment: Private residence (looking for a better environment)  # Steps to Enter: 5  Rails to Enter: Yes  Hand Rails : Bilateral (ujses Left)  One/Two Story Residence: One story  Living Alone: Yes (son and nephew check on pt)  Support Systems: Home care staff (wound care nursing 2X a week)  Patient Expects to be Discharged to[de-identified] Private residence  Current DME Used/Available at Home: Commode, bedside, CPAP, Walker, rollator. Pt reports that she needs a new bedside tray table  Tub or Shower Type:  (does not go into bathroom due to mold and unable to get in with rollator)  [x]  Right hand dominant   []  Left hand dominant    EXAMINATION OF PERFORMANCE DEFICITS:  Cognitive/Behavioral Status:  Neurologic State: Alert  Orientation Level: Appropriate for age  Cognition: Appropriate decision making; Follows commands  Perception: Appears intact  Perseveration: No perseveration noted  Safety/Judgement: Home safety    Skin: LLE larger than R-darkened skin, poor skin integrity    Edema: BLEs  R >L    Hearing: Auditory  Auditory Impairment: None                                         Range of Motion:  BUEs: within functional limits  AROM: Generally decreased, functional  PROM: Generally decreased, functional                      Strength:  BUEs:   Strength: Within functional limits                Coordination:  Coordination: Within functional limits  Fine Motor Skills-Upper: Left Intact; Right Intact    Gross Motor Skills-Upper: Left Intact; Right Intact    Tone & Sensation:    Tone: Normal  Sensation: Intact                      Balance:  Sitting: Intact  Standing: Intact    Functional Mobility and Transfers for ADLs:  Bed Mobility:  Rolling: Independent  Supine to Sit: Independent  Scooting: Independent    Transfers:  Sit to Stand: Independent  Stand to Sit: Independent  Bed to Chair: Independent  Toilet Transfer : Independent    ADL Assessment:  Feeding: Independent    Oral Facial Hygiene/Grooming: Setup    Bathing: Moderate assistance (this is baseline for pt--has aide who assists w sponge bath)    Upper Body Dressing: Minimum assistance    Lower Body Dressing: Maximum assistance (functional reach to ankles)    Toileting: Independent;Stand by assistance  Meal Preparation:  (has aide at home to prepare/pt is sensitive to cooking odor)             ADL Intervention and task modifications:   Pt performed bed mobility, functional mobiltiy to bathroom and toilet transfer, toileting and standing grooming all with independence/stand by assistance. Pt is able to reach to her ankles bending forward from a seated position. This is difficult for her due to body habitus, and compromises her breathing Pt educated in importance of sitting up throughout the day to prevent weakness and was encouraged to perform seated exercises independently. Pt verbalized understanding. Pt may benefit from using a reacher at home to  dropped objects  And prevent falls during adls. Cognitive Retraining  Safety/Judgement: Home safety    Therapeutic Exercise:  Encouraged seated BUE and BLE exercises  BUE shoulder flexion, abduction and internal rotation  BLE seated marching and seated knee flex/ext-educated on holding LEs in extension to add a strengthening component.    Functional Measure:  Barthel Index:    Bathin  Bladder: 10  Bowels: 10  Groomin  Dressing: 10  Feeding: 10  Mobility: 0  Stairs: 0  Toilet Use: 10  Transfer (Bed to Chair and Back): 15  Total: 70       Barthel and G-code impairment scale:  Percentage of impairment CH  0% CI  1-19% CJ  20-39% CK  40-59% CL  60-79% CM  80-99% CN  100%   Barthel Score 0-100 100 99-80 79-60 59-40 20-39 1-19   0   Barthel Score 0-20 20 17-19 13-16 9-12 5-8 1-4 0      The Barthel ADL Index: Guidelines  1. The index should be used as a record of what a patient does, not as a record of what a patient could do. 2. The main aim is to establish degree of independence from any help, physical or verbal, however minor and for whatever reason. 3. The need for supervision renders the patient not independent. 4. A patient's performance should be established using the best available evidence. Asking the patient, friends/relatives and nurses are the usual sources, but direct observation and common sense are also important. However direct testing is not needed. 5. Usually the patient's performance over the preceding 24-48 hours is important, but occasionally longer periods will be relevant. 6. Middle categories imply that the patient supplies over 50 per cent of the effort. 7. Use of aids to be independent is allowed. Mauricio Hand., Barthel, D.W. ([id number]). Functional evaluation: the Barthel Index. [facility] (14)2. PEACE Woodward, Lena Lloyd., Chris Whalen., [place], [address] [place] [address] (1999). Measuring the change indisability after inpatient rehabilitation; comparison of the responsiveness of the Barthel Index and Functional [place] Measure. Journal of Neurology, Neurosurgery, and Psychiatry, 66(4), 814-132. KRIS Price, AGUSTO Zarco, & Donna Payton M.A. (2004.) Assessment of post-stroke quality of life in cost-effectiveness studies: The usefulness of the Barthel Index and the EuroQoL-5D. Quality of Life Research, 13, 223-28       G codes: In compliance with CMSs Claims Based Outcome Reporting, the following G-code set was chosen for this patient based on their primary functional limitation being treated: The outcome measure chosen to determine the severity of the functional limitation was the Barthel Index with a score of 70/100 which was correlated with the impairment scale. ?  Self Care:     - CURRENT STATUS: CJ - 20%-39% impaired, limited or restricted    - GOAL STATUS: CJ - 20%-39% impaired, limited or restricted    - D/C STATUS:  CJ - 20%-39% impaired, limited or restricted     Occupational Therapy Evaluation Charge Determination   History Examination Decision-Making   HIGH Complexity : Extensive review of history including physical, cognitive and psychosocial history  MEDIUM Complexity : 3-5 performance deficits relating to physical, cognitive , or psychosocial skils that result in activity limitations and / or participation restrictions MEDIUM Complexity : Patient may present with comorbidities that affect occupational performnce. Miniml to moderate modification of tasks or assistance (eg, physical or verbal ) with assesment(s) is necessary to enable patient to complete evaluation       Based on the above components, the patient evaluation is determined to be of the following complexity level: HIGH   Pain:  Pain Scale 1: Numeric (0 - 10)  Pain Intensity 1: 0              Activity Tolerance:   Decreased due to respiratory status and body habitus--desat to mid 80s during functional mobility (short distance) on 6 L nasal cannula  (post breathing treatment)  Please refer to the flowsheet for vital signs taken during this treatment. After treatment:   [x]  Patient left in no apparent distress sitting up in chair  []  Patient left in no apparent distress in bed  [x]  Call bell left within reach  [x]  Nursing notified  []  Caregiver present  []  Bed alarm activated    COMMUNICATION/EDUCATION:   Communication/Collaboration:  []      Home safety education was provided and the patient/caregiver indicated understanding. [x]      Patient/family have participated as able and agree with findings and recommendations. []      Patient is unable to participate in plan of care at this time.   Findings and recommendations were discussed with: Physical Therapist, Registered Nurse and     YURY Berry/SERINA  Time Calculation: 50 mins

## 2018-01-31 NOTE — WOUND CARE
Wound Care Consult for lymphedema lower leg weeping. Patient is well known to the wound care team for this problem. She has not been willing to follow up with the outpatient Lymphedema clinic in the past and does not do any treatment at home because she cannot reach the lower leg completely. Assessment: Pt's edema is down more today but her left leg size is greater than the right. There is a weeping area on the distal part of the posterior left leg just before the heel but no open wound visible. Treatment Recommended: Spoke with Nurse, Ora Manning about placing a 4x4 and [place] to cover / absorb the serous drainage to the area. Nursing is already applying the Lac-Hydrin lotion twice per day and cleansing the leg x 2 per day.    Jennifer Donohue RN, BSN, [employer]

## 2018-01-31 NOTE — PROGRESS NOTES
[facility] Consult Service Note  Pulmonary, Critical Care, and Sleep Medicine    Name: Doni Conway MRN: [id number]   : 1970 Hospital: [facility]   Date: 2018   Hospital Day: 3       Subjective/Interval History:   I have reviewed the notes from other providers and old records readily available and summarized findings below with the flowsheet. Seen earlier today on rounds. IMPRESSION:   1. Acute on Chronic hypoxic/hypercapnic respiratory failure on home O2 5 LPM- now on 6 LPm and sasts 88-92% at rest  2. GEN/OHS - followed by Dr. Nikhil nicole, not compliant with her home Trilogy (currently attributes this to not liking her full face mask)- yet to see him in followup  3. COPD/asthma - no prior PFTs, no evidence of exacerbation with exacerbation   4. CAD- h/o stents  5. HTN  6. DM  7. Active Tobacco use  8. H/o SVT (supraventricular tachycardia)on BB  9. HTN  10. DM 2 controlled without complication: glyburide   11. Lymphedema with chronic LLE wound:  lac-hydrin ordered  12. Supermorbid obesity (body mass index is 64.56 kg/(m^2) at very high risk for volume overload and fluid retention- looking back on weights; one year ago PCO2 usually less than 60; does better when weight less than 400 pounds; as much as 445 last Summer; now 372?   13. Prognosis guarded. 14. Discussed with nurse this am.       RECOMMENDATIONS/PLAN:   1. Avoid metabolic alkalosis with diuresis-  2. Appreciate Case Management's help- pt not using services which are provided by Medicaid; can pt get bathroom scale with capacity that can weight her? 3. No means to bring her Trilogy into hospital- ideally she should be on her home Trilogy and we might be able to adjust mask for her and desensitizer her here  4. She needs f/u with sleep doctor (Dr. Sylvia Moreau)   5.  In the meantime, would maintain using our BIPAP machine AVAPS, , Pmax 50, PS max 20, PS min 10, EPAPmax 10, EPAP min 4, with 6 LPM O2 bled in (or 35% FiO2 on our machines)  6. Recommended better compliance to reduce hospitalizations and help with edema  7. Mobilize  8. Follow ABG  9. Tobacco cessation counseling   10. Replete K  11. Weight reduction counseling  12. Pt education-she must use her NIV device as much as possible 8+ hours or more to help keep PCO2 from climbing otherwise risk coma, death, CVA, MI  15. Prescription drug management with home med reconciliation done          My assessment/ and management was discussed with:  nursing    respiratory therapy Dr.   family      Pt's condition is acute and unstable requiring inpatient hospitalization. This care involved high complexity decision making which includes independently reviewing the patient's past medical records, current laboratory results, medication profiles that were immediately available to me and actual Xray images at the bedside in order to assess, support vital system function, and to treat this degree of vital organ system failure, and to prevent further deterioration of the patients condition. Risk of deterioration: high   [x] High complexity decision making was performed  [x] See my orders for details      Subjective/Initial History:     I was asked by Rock Belkys MD to see Jeremie Jim in consultation for a chief complaint of acute on chronic respiratory failure now on BIPAP    Patient is a 41 y.o. female smoker with GEN/OHS, follows with Dr. Emmanuel Christiansen of [facility]. She is noncompliant with her BiPAP and now Trilogy. She was recently started on nocturnal noninvasive ventilation, but she has not been compliant because she does not like the mask she was given. Per pt records, she was admitted to the hospital from 1/20/18 - 1/24/18 for COPD and seen once per  prior to her discharge. Pt was seen on 01/26 for similar symptoms and discharged home.  Pt was given a dose of steroids yesterday while in [facility] ED and by dispatch health today. She carries a diagnosis of COPD/asthma and was followed with Dr. Sharif Damico for years, but she has never had PFTs. No wheezing at this time. Seen by Dr. Benedict Cherry. For history of CAD including  of LAD, REED to [facility] in the past, but EKG, enzymes and echo normal. Per EMS pt wears 5 L O2 at baseline at home. Per patient, she is been feeling short of breath for past couple of weeks, worsen with exertion. She also reported mild productive cough with clear sputum. Pt denies any fever, chills, nausea, vomiting, diarrhea, problems urination. In ED pt noted to be hypoxic on her 5L O2 and ABgs showed respiratory acidosis. Pt admits not having worn her trilogy at home. Does not her or our machine. Has a part time aide but no transportation. Does not like lying in bed. Back sore. Had no means to get her medications after last discharge. Reivew of chart notes PCO 60's one year ago June 2017 and weight was as high as 445 then. Weight as low as 390 a few weeks ago now up again to 400 pounds.        No Known Allergies     MAR reviewed and pertinent medications noted or modified as needed   Current Facility-Administered Medications   Medication    sodium chloride (NS) flush 5-10 mL    aspirin delayed-release tablet 81 mg    fluticasone (FLONASE) 50 mcg/actuation nasal spray 2 Spray    insulin lispro (HUMALOG) injection    glucose chewable tablet 16 g    dextrose (D50W) injection syrg 12.5-25 g    glucagon (GLUCAGEN) injection 1 mg    metoprolol tartrate (LOPRESSOR) tablet 25 mg    pantoprazole (PROTONIX) tablet 40 mg    traMADol (ULTRAM) tablet 50 mg    nitroglycerin (NITROBID) 2 % ointment 1 Inch    sodium chloride (NS) flush 5-10 mL    sodium chloride (NS) flush 5-10 mL    acetaminophen (TYLENOL) tablet 650 mg    ondansetron (ZOFRAN) injection 4 mg    enoxaparin (LOVENOX) injection 40 mg    furosemide (LASIX) injection 40 mg    albuterol (PROVENTIL VENTOLIN) nebulizer solution 2.5 mg    albuterol-ipratropium (DUO-NEB) 2.5 MG-0.5 MG/3 ML    ammonium lactate (LAC-HYDRIN) 12 % lotion    nystatin (MYCOSTATIN) 100,000 unit/gram powder      PMH:  has a past medical history of Arthritis; Asthma; CAD (coronary artery disease); Congestive heart failure ([facility] [address] COPD (chronic obstructive pulmonary disease) ([facility] [address] Diabetes ([facility] Hypertension; Morbid obesity with BMI of 70 and over, adult [facility]); NSTEMI (non-ST elevated myocardial infarction) ([facility] [address] and SVT (supraventricular tachycardia) ([facility] PSH:   has a past surgical history that includes hx orthopaedic; hx other surgical; hx  section; and pr cardiac surg procedure unlist.   FHX: family history includes Heart Disease in her father, mother, and sister. SHX:  reports that she has been smoking Cigarettes. She has been smoking about 0.25 packs per day. She has never used smokeless tobacco. She reports that she does not drink alcohol or use illicit drugs. ROS:A comprehensive review of systems was negative except for that written in the HPI.     Objective:     Vital Signs: Intake/Output: Intake/Output:   Temp (24hrs), Av.1 °F (36.7 °C), Min:97.7 °F (36.5 °C), Max:98.4 °F (36.9 °C)     Visit Vitals    /77    Pulse 100    Temp 97.7 °F (36.5 °C)    Resp 18    Ht 5' 6\" (1.676 m)    Wt (!) 168.9 kg (372 lb 6.4 oz)    SpO2 90%    BMI 60.11 kg/m2        Telemetry:    normal sinus rhythm O2 Device: Nasal cannula  O2 Flow Rate (L/min): 6 l/min  Wt Readings from Last 4 Encounters:   18 (!) 168.9 kg (372 lb 6.4 oz)   18 (!) 177.4 kg (391 lb)   18 (!) 177.9 kg (392 lb 2 oz)   17 (!) 201.9 kg (445 lb)          Intake/Output Summary (Last 24 hours) at 18 1039  Last data filed at 18 1020   Gross per 24 hour   Intake              540 ml   Output             2875 ml   Net            -2335 ml     Last shift:      701 - 1900  In: -   Out: 3228 [STEFANI:]  Last 3 shifts: 1901 -  0700  In: 540 [P.O.:540]  Out: 1500 [Urine:1500]     Physical Exam:    General: alert and moderately ill AAF   HEAD: Normocephalic, without obvious abnormality, atraumatic   EYES: conjunctivae clear. PERRL,  AN Icteric sclerae   NOSE: nares normal, no drainage, no nasal flaring,    THROAT: ; Lips, mucosa dry; No Thrush; class  airway; dentures;  tongue midline   Neck: Supple, symmetrical, trachea midline,  No accessory mm use; No Stridor/ cuff leak, No goiter or thyroid tenderness   LYMPH:  in neck or groin   Chest: increased AP diameter   Lungs: decreased air exchange bilaterally   Heart: Regular rate and rhythm; severe edema   Abdomen: soft, non-tender, without masses or organomegaly, OBESE   : deferred; Extremity: negative, clubbing; no joint swelling or erythema   Neuro: alert; speech fluent ;verbal; withdraws to pain; unable to check gait and station   Psych: oriented to time, place and person; easily agititated   Skin: Warm; thicked, brawny, verucous changes BLE   Pulses:Bilateral, Radial, 2+   Capillary refill: normal; well perfused,    Data:     All lab results for the last 24 hours reviewed.              Labs:    Recent Labs      01/31/18   0533  01/29/18   2332   WBC  8.8  8.5   HGB  13.8  14.6   PLT  122*  131*     Recent Labs      01/31/18   0533  01/30/18   0126  01/29/18   2355   NA  136   --   139   K  3.9   --   4.3   CL  96*   --   100   CO2  37*   --   36*   GLU  92   --   109*   BUN  20   --   19   CREA  0.75   --   0.88   CA  9.4   --   9.6   LAC   --   0.6   --    ALB  2.8*   --   3.2*   SGOT  16   --   18   ALT  24   --   27     Recent Labs      01/30/18   1153  01/30/18   0857  01/29/18   2332   PH  7.33*  7.29*  7.29*   PCO2  71*  85*  78*   PO2  67*  85  52*   HCO3  36*  40*  36*   FIO2  40  50   --      Recent Labs      01/29/18   2355   CPK  30   CKNDX  Cannot be calculated   TROIQ  <0.04     Lab Results   Component Value Date/Time    BNP 33 01/12/2016 02:54 AM      Lab Results Component Value Date/Time    Culture result: NO GROWTH 1 DAY 01/30/2018 01:47 AM    Culture result: NO GROWTH 1 DAY 01/30/2018 01:26 AM    Culture result: MODERATE NORMAL RESPIRATORY AMRIK 09/24/2017 10:56 PM    Culture result: LIGHT ACINETOBACTER BAUMANNII/HAEMOLYTICUS 09/24/2017 10:56 PM    Culture result: SCANT SERRATIA MARCESCENS 09/24/2017 10:56 PM       Lab Results   Component Value Date/Time    TSH 1.79 11/13/2014 12:00 AM       Imaging:            Results from [place] encounter on 01/29/18   XR CHEST PA LAT   Narrative EXAM:  XR CHEST PA LAT    INDICATION:  Shortness of breath, chronic COPD. COMPARISON: Portable chest on 1/27/2018    TECHNIQUE: PA and lateral chest views    FINDINGS: Cardiac monitoring wires overlie the thorax. Mild cardiomegaly is  unchanged. Aortic contours are within normal limits. The pulmonary vasculature  is within normal limits. There is evidence of mild pulmonary edema. No lobar pneumonia or pneumothorax. Probable small bilateral pleural effusions. The visualized bones and upper  abdomen are age-appropriate. Impression IMPRESSION:    Mild pulmonary edema versus interstitial pneumonia. New since 2 days ago. Results from [place] encounter on 01/20/18   CTA CHEST W OR W WO CONT   Narrative INDICATION:   eval for PE chest pain    COMPARISON: 2017    TECHNIQUE:   Precontrast  images were obtained to localize the volume for acquisition. Multislice helical CT arteriography was performed from the diaphragm to the  thoracic inlet during uneventful rapid bolus intravenous administration of 100  mL of Isovue-370. Lung and soft tissue windows were generated. Post processing  was performed and coronal reformatted images were also generated. 3 D imaging  was performed. CT dose reduction was achieved through use of a standardized  protocol tailored for this examination and automatic exposure control for dose  modulation.      FINDINGS:  Study is limited by patient's body habitus. Thyroid gland is unremarkable. Main pulmonary artery is enlarged measuring 4 cm. Thoracic aorta is within  normal limits. No mediastinal or hilar adenopathy identified. Study is limited by patient's body habitus. No definite pulmonary identified. There is suspicion of scattered small airspace opacities in the upper lobes  bilaterally right greater than left. Impression IMPRESSION:   1. There is prominence of the main pulmonary artery may represent pulmonary  artery hypertension. 2. Study is compromised by patient's body habitus but no definite pulmonary  emboli identified. 3. There are small scattered airspace opacities in the upper lobes bilaterally  which was present on a CT dated 9/22/2017. .            I have personally and independently reviewed the patients interval and diagnostic data, radiographs and have reviewed the reports. I have ordered additional labs to follow the current medical conditions and to monitor treatment responses over the next 24 hours or sooner if needed. If the pt needs,  additional imaging will be obtained to follow longitudinal changes found on the most current imaging. Thank you for allowing us to participate in the care of this patient. We will be happy to follow with you.     Mayo Bonilla MD

## 2018-01-31 NOTE — PROGRESS NOTES
Hospitalist Progress Note    NAME: Isael Brooks   :  1970   MRN:  [id number]       Assessment / Plan:  Acute respiratory distress with acute on chronic combined hypoxic/hypercarbic respiratory failure, pulmonary HTN, obesity hypoventilation and ongoing GEN/noncompliance with CPAP: Baseline O2 5 LPM;  Pt says that she still has not been using her trilogy machine after recent discharge. Discussed with her again today, she says she now understands that she needs to use her machine.  - CXR with mild pulmonary edema versus interstitial pneumonia. New since 2 days ago. - echo  with EF 55-60%. Suboptimal endocardial visualization limits wall motion analysis. E/o pulmonary HTN on CT chest last admission.  - aggressive diuresis with IV lasix BID (on lasix 40mg po at home)  - con't BiPAP at night and when sleeping; appreciate pulmonology assistance  - change nebs to duonebs scheduled with prn albuterol  - PT/OT evals completed, not recommending services despite 4 ER visits in 2 weeks? Noninsulin dependent DM2 controlled without complication:  - restart home glyburide  - prn lispro sliding scale  Benign HTN:  BP remains low  - con't holding lisinopril  - reduced metoprolol, added hold parameters  - con't diuresis as able  - prn nitrbid  Lymphedema with chronic LLE wound:  lac-hydrin ordered  COPD without acute exacerbation: nebs as above. Supermorbid obesity (body mass index is 60.11 kg/(m^2)      Code Status: Full  DVT Prophylaxis: lovenox     Subjective:     Chief Complaint / Reason for Physician Visit  \"I'm feeling better today\". Discussed with RN events overnight.      Review of Systems:  Symptom Y/N Comments  Symptom Y/N Comments   Fever/Chills n   Chest Pain n    Poor Appetite n   Edema n    Cough n   Abdominal Pain n    Sputum n   Joint Pain     SOB/RAZO y   Pruritis/Rash     Nausea/vomit    Tolerating PT/OT     Diarrhea    Tolerating Diet     Constipation    Other       Could NOT obtain due to: Objective:     VITALS:   Last 24hrs VS reviewed since prior progress note. Most recent are:  Patient Vitals for the past 24 hrs:   Temp Pulse Resp BP SpO2   01/31/18 0748 - 100 - - 90 %   01/31/18 0744 97.7 °F (36.5 °C) (!) 107 18 106/77 90 %   01/31/18 0732 - - - - 90 %   01/31/18 0400 98.3 °F (36.8 °C) 89 18 96/63 92 %   01/31/18 0344 - - - - 93 %   01/31/18 0113 - - - - (!) 88 %   01/31/18 0000 98.1 °F (36.7 °C) 87 18 90/62 91 %   01/30/18 2000 98.3 °F (36.8 °C) 89 18 123/77 92 %   01/30/18 1541 97.9 °F (36.6 °C) 94 20 111/72 91 %   01/30/18 1431 - - - - 91 %   01/30/18 1236 98.4 °F (36.9 °C) 84 22 94/68 92 %   01/30/18 1138 - 85 27 - 92 %       Intake/Output Summary (Last 24 hours) at 01/31/18 1033  Last data filed at 01/31/18 1020   Gross per 24 hour   Intake              540 ml   Output             2875 ml   Net            -2335 ml        PHYSICAL EXAM:  General: Obese. Alert, cooperative, no acute distress    EENT:  EOMI. Anicteric sclerae. MMM  Resp:  CTA bilaterally, no wheezing or rales. Stable poor air movement. No accessory muscle use  CV:  Regular rhythm,  No edema  GI:  Soft, moderately distended, Non tender.  +Bowel sounds  Neurologic:  Alert and oriented X 3, normal speech,   Psych:   Some insight. Not anxious nor agitated  Skin:  No rashes. No jaundice    Reviewed most current lab test results and cultures  YES  Reviewed most current radiology test results   YES  Review and summation of old records today    NO  Reviewed patient's current orders and MAR    YES  PMH/SH reviewed - no change compared to H&P  ________________________________________________________________________  Care Plan discussed with:    Comments   Patient x    Family      RN x    Care Manager x    Consultant                        Multidiciplinary team rounds were held today with , nursing, pharmacist and clinical coordinator.   Patient's plan of care was discussed; medications were reviewed and discharge planning was addressed. ________________________________________________________________________  Total NON critical care TIME:  30 Minutes    Total CRITICAL CARE TIME Spent:   Minutes non procedure based      Comments   >50% of visit spent in counseling and coordination of care x    ________________________________________________________________________  Bridget Marshall MD     Procedures: see electronic medical records for all procedures/Xrays and details which were not copied into this note but were reviewed prior to creation of Plan. LABS:  I reviewed today's most current labs and imaging studies.   Pertinent labs include:  Recent Labs      01/31/18 0533  01/29/18   2332   WBC  8.8  8.5   HGB  13.8  14.6   HCT  45.5  48.7*   PLT  122*  131*     Recent Labs      01/31/18   0533  01/29/18   2355   NA  136  139   K  3.9  4.3   CL  96*  100   CO2  37*  36*   GLU  92  109*   BUN  20  19   CREA  0.75  0.88   CA  9.4  9.6   ALB  2.8*  3.2*   TBILI  0.5  0.4   SGOT  16  18   ALT  24  27       Signed: Bridget Marshall MD

## 2018-01-31 NOTE — PROGRESS NOTES
Porsha GREEN CM from [place] [address] reached out [employer]. [phone]  Patient is not appropriate to discharge home. Patient has had multiple presentations to ED and admissions. Patient has a medicaid aide mon-fri 9-3. Lives with son. Care is questionable. Multiple calls for wound care regarding lymphedema. Multiple attempts to refer patient to lymphedema clinic. Has 5LNC continuous at home - concentrator and portables. [facility] provides transport. Portables are not adequate for transport to clinic. DME at home - hospital bed, rollator, BSC. CPAP, Oxygen concentrator and portables. HHRN informed this CM that bathroom and shower are not accessible with assistive device to patient. Bathroom is too small to accomodate. Sallie Kapoor RN, [facility]  informed Cm that they may not be able to continue to provide care. There are no realistic goals. Patient is not appropriate for discharge home. Patient was on services for Wound Care to left leg. No PTOT. [facility] sent patient to ED last Friday. 2 admissions past 3 weeks. Chart review. [id number]  admission patient declined SNF. January 2018 admission patient declined pulmonary rehab. This admission - patient admitted 1/30/2018 Acute on chronic respiratory failure. PTOT and Woundcare have been ordered. Consults to CM for assistance with transportation, , appt to see Dr. John Fernandez doc. Discharge planning. CM will continue to follow.        Poly Jaffe RN CM  Ext [phone]

## 2018-01-31 NOTE — PROGRESS NOTES
physical Therapy EVALUATION/DISCHARGE  Patient: Yamileth Gore (26 y.o. female)  Date: 2018  Primary Diagnosis: Acute on chronic respiratory failure with hypoxia and hypercapnia (HCC)        Precautions:   Contact  ASSESSMENT :  Based on the objective data described below, the patient presents with independent bed mobility, independent sit to stand, and independent ambulation to the bathroom and for toileting. Able to ambulate 10 feet on 6L with PO2 decreasing to 84%. Stops and rests until PO2 comes back up to the low 90's and then she is able to walk a little further. No PT needs. Able to be up as tolerated. Limited by endurance and low PO2 with activity. Skilled physical therapy is not indicated at this time. Will complete the order. PLAN :  Discharge Recommendations: None  Further Equipment Recommendations for Discharge: none-has a rollator that has to stay on the porch because it doesn't fit in the door. SUBJECTIVE:   Patient stated I am going home.     OBJECTIVE DATA SUMMARY:   HISTORY:    Past Medical History:   Diagnosis Date    Arthritis     Asthma     CAD (coronary artery disease)     Congestive heart failure ([address]     [facility]     Diabetes [facility]     Hypertension     Morbid obesity with BMI of 70 and over, adult ([address]     NSTEMI (non-ST elevated myocardial infarction) ([address]     SVT (supraventricular tachycardia) ([address]      Past Surgical History:   Procedure Laterality Date    CARDIAC SURG PROCEDURE UNLIST      Stents    HX  SECTION      HX ORTHOPAEDIC      HX OTHER SURGICAL      cyst removed from back     Prior Level of Function/Home Situation: Lives alone. Has a medicaid aide. [facility].    Personal factors and/or comorbidities impacting plan of care:     Home Situation  Home Environment: (P) Private residence (looking for a better environment)  # Steps to Enter: (P) 5  Rails to Enter: (P) Yes  Hand Rails : (P) Bilateral (ujses Left)  One/Two Story Residence: (P) One story  Living Alone: (P) Yes (son and nephew check on pt)  Support Systems: (P) Home care staff (wound care nursing 2X a week)  Patient Expects to be Discharged to[de-identified] (P) Private residence  Current DME Used/Available at Home: (P) Commode, bedside, CPAP, Walker, rollator  Tub or Shower Type: (P)  (does not go into bathroom due to mold and unable to get in w)    EXAMINATION/PRESENTATION/DECISION MAKING:   Critical Behavior:  Neurologic State: Alert  Orientation Level: Oriented X4  Cognition: Appropriate decision making, Appropriate for age attention/concentration, Appropriate safety awareness  Safety/Judgement: Awareness of environment  Hearing: Auditory  Auditory Impairment: None  Skin:  Per nursing. Edema: per nursing. Range Of Motion:  AROM: Generally decreased, functional           PROM: Generally decreased, functional           Strength:    Strength: Within functional limits                    Tone & Sensation:   Tone: Normal              Sensation: Intact               Coordination:  Coordination: Within functional limits  Vision:      Functional Mobility:  Bed Mobility:  Rolling: Independent  Supine to Sit: Independent     Scooting: Independent  Transfers:  Sit to Stand: Independent  Stand to Sit: Independent                       Balance:   Sitting: Intact  Standing: Intact  Ambulation/Gait Training:  Distance (ft): 10 Feet (ft)  Assistive Device: Other (comment) (none)  Ambulation - Level of Assistance: Independent     Gait Description (WDL): Exceptions to WDL  Gait Abnormalities: Decreased step clearance;Circumduction;Trunk sway increased        Base of Support: Widened     Speed/Danay: Pace decreased (<100 feet/min)  Step Length: Right shortened;Left shortened                      Wide based waddling gait.                      Functional Measure:  Barthel Index:    Bathin  Bladder: 10  Bowels: 10  Groomin  Dressing: 10  Feeding: 10  Mobility: 0  Stairs: 0  Toilet Use: 10  Transfer (Bed to Chair and Back): 15  Total: 70       Barthel and G-code impairment scale:  Percentage of impairment CH  0% CI  1-19% CJ  20-39% CK  40-59% CL  60-79% CM  80-99% CN  100%   Barthel Score 0-100 100 99-80 79-60 59-40 20-39 1-19   0   Barthel Score 0-20 20 17-19 13-16 9-12 5-8 1-4 0      The Barthel ADL Index: Guidelines  1. The index should be used as a record of what a patient does, not as a record of what a patient could do. 2. The main aim is to establish degree of independence from any help, physical or verbal, however minor and for whatever reason. 3. The need for supervision renders the patient not independent. 4. A patient's performance should be established using the best available evidence. Asking the patient, friends/relatives and nurses are the usual sources, but direct observation and common sense are also important. However direct testing is not needed. 5. Usually the patient's performance over the preceding 24-48 hours is important, but occasionally longer periods will be relevant. 6. Middle categories imply that the patient supplies over 50 per cent of the effort. 7. Use of aids to be independent is allowed. Viola Torrez., Barthel, DMangoW. (1638). Functional evaluation: the Barthel Index. [address] [facility] (14)2. Daleen Fend rickie [place], J.J.M.F, Denis Davidson., Jon Da Silva.[address] (1999). Measuring the change indisability after inpatient rehabilitation; comparison of the responsiveness of the Barthel Index and Functional Rawson Measure. Journal of Neurology, Neurosurgery, and Psychiatry, 66(4), 820-620. Hadley Calloway, N.J.A, AGUSTO Zarco, & Sergey Lin, M.A. (2004.) Assessment of post-stroke quality of life in cost-effectiveness studies: The usefulness of the Barthel Index and the EuroQoL-5D. Quality of Life Research, 13, 364-74       G codes:   In compliance with CMSs Claims Based Outcome Reporting, the following G-code set was chosen for this patient based on their primary functional limitation being treated: The outcome measure chosen to determine the severity of the functional limitation was the Barthel with a score of 70/100 which was correlated with the impairment scale. ? Mobility - Walking and Moving Around:     - CURRENT STATUS: CH - 0% impaired, limited or restricted  CJ - 20%-39% impaired, limited or restricted    - GOAL STATUS: CI - 1%-19% impaired, limited or restricted    - D/C STATUS:  ---------------To be determined---------------      Pain:  Pain Scale 1: Numeric (0 - 10)  Pain Intensity 1: 0     Activity Tolerance:   Poor. Please refer to the flowsheet for vital signs taken during this treatment. After treatment:   [x]   Patient left in no apparent distress sitting up in chair  []   Patient left in no apparent distress in bed  [x]   Call bell left within reach  [x]   Nursing notified  []   Caregiver present  []   Bed alarm activated    COMMUNICATION/EDUCATION:   Communication/Collaboration:  [x]   Fall prevention education was provided and the patient/caregiver indicated understanding. [x]   Patient/family have participated as able and agree with findings and recommendations. []   Patient is unable to participate in plan of care at this time.   Findings and recommendations were discussed with: Occupational Therapist and Registered Nurse    Thank you for this referral.  Jean Carlos Sharp, PT   Time Calculation: 15 mins

## 2018-01-31 NOTE — PROGRESS NOTES
ADULT PROTOCOL: JET AEROSOL ASSESSMENT    Patient  Fortino Bustamante     41 y.o.   female     1/31/2018  9:12 AM    Breath Sounds Pre Procedure:  Diminished       Post Procedure: Diminished                                     Breathing pattern: Pre procedure Breathing Pattern: Regular          Post procedure Breathing Pattern: Regular    Heart Rate: Pre procedure Pulse: 105           Post procedure Pulse: 103    Resp Rate: Pre procedure Respirations: 18           Post procedure Respirations: 18            Cough: Pre procedure Cough: Non-productive               Post procedure        Oxygen: O2 Device: Nasal cannula   6L        SpO2: Pre procedure SpO2: 90 %                 Post procedure SpO2: 90 %      Nebulizer Therapy: Current medications Aerosolized Medications: DuoNeb Q6hrs      Changed: NO    Smoking History: Current    Problem List:   Patient Active Problem List   Diagnosis Code    CHF [facility]congestive heart failure) ([facility]) I50.9    Malignant essential hypertension I10    Asthma J45.909    Obesity hypoventilation syndrome ([facility]) E66.2    Dyspnea R06.00    Chest pressure R07.89    Acute on chronic diastolic heart failure ([facility]) I50.33    SVT (supraventricular tachycardia) ([facility]) I47.1    NSTEMI (non-ST elevated myocardial infarction) ([facility]) I21.4    S/P PTCA (percutaneous transluminal coronary angioplasty) Z98.61    Coronary atherosclerosis of native coronary artery I25.10    Hypoxia R09.02    Noncompliance with therapeutic plan Z91.11    Chest pain R07.9    GERD (gastroesophageal reflux disease) K21.9    Acute respiratory failure with hypoxia and hypercarbia ([facility]) J96.01, J96.02    COPD (chronic obstructive pulmonary disease) ([facility]) J44.9    Tobacco abuse Z72.0    Obesity, morbid (more than 100 lbs over ideal weight or BMI > 40) ([facility]) E66.01    Cellulitis L03.90    SIRS due to infectious process with acute organ dysfunction ([facility]) A41.9, R65.20    Sepsis associated hypotension ([facility]) A41.9    Gangrenous toe (HCC) I96    Type II diabetes mellitus, uncontrolled [facility] E11.65    HTN (hypertension) I75    Diastolic CHF, chronic ([facility]) I50.32    Cough with hemoptysis R04.2    Lymphedema of both lower extremities I89.0    CAP (community acquired pneumonia) J18.9    Cellulitis, leg L03.119    COPD exacerbation ([facility]) J44.1    Maggot infestation B87.9    SOB (shortness of breath) R06.02    Shortness of breath R06.02    Multifocal pneumonia J18.9    Acute respiratory failure (HCC) J96.00    Acute on chronic respiratory failure with hypoxia and hypercapnia ([facility]) J96.21, J96.22       Respiratory Therapist: Evette Kowalski V RT

## 2018-02-01 LAB
ARTERIAL PATENCY WRIST A: YES
BASE EXCESS BLDA CALC-SCNC: 9.1 MMOL/L
BDY SITE: ABNORMAL
BREATHS.SPONTANEOUS ON VENT: 18
GAS FLOW.O2 O2 DELIVERY SYS: 6 L/MIN
GLUCOSE BLD STRIP.AUTO-MCNC: 114 MG/DL (ref 65–100)
GLUCOSE BLD STRIP.AUTO-MCNC: 118 MG/DL (ref 65–100)
GLUCOSE BLD STRIP.AUTO-MCNC: 119 MG/DL (ref 65–100)
GLUCOSE BLD STRIP.AUTO-MCNC: 121 MG/DL (ref 65–100)
HCO3 BLDA-SCNC: 37 MMOL/L (ref 22–26)
PCO2 BLDA: 63 MMHG (ref 35–45)
PH BLDA: 7.39 [PH] (ref 7.35–7.45)
PO2 BLDA: 60 MMHG (ref 80–100)
SAO2 % BLD: 90 % (ref 92–97)
SAO2% DEVICE SAO2% SENSOR NAME: ABNORMAL
SERVICE CMNT-IMP: ABNORMAL
SPECIMEN SITE: ABNORMAL

## 2018-02-01 PROCEDURE — 65660000000 HC RM CCU STEPDOWN

## 2018-02-01 PROCEDURE — 82962 GLUCOSE BLOOD TEST: CPT

## 2018-02-01 PROCEDURE — 36600 WITHDRAWAL OF ARTERIAL BLOOD: CPT

## 2018-02-01 PROCEDURE — 74011250637 HC RX REV CODE- 250/637: Performed by: INTERNAL MEDICINE

## 2018-02-01 PROCEDURE — 74011250636 HC RX REV CODE- 250/636: Performed by: INTERNAL MEDICINE

## 2018-02-01 PROCEDURE — 74011000250 HC RX REV CODE- 250: Performed by: INTERNAL MEDICINE

## 2018-02-01 PROCEDURE — 94640 AIRWAY INHALATION TREATMENT: CPT

## 2018-02-01 PROCEDURE — 94660 CPAP INITIATION&MGMT: CPT

## 2018-02-01 PROCEDURE — 82803 BLOOD GASES ANY COMBINATION: CPT | Performed by: INTERNAL MEDICINE

## 2018-02-01 PROCEDURE — 77010033678 HC OXYGEN DAILY

## 2018-02-01 PROCEDURE — 36600 WITHDRAWAL OF ARTERIAL BLOOD: CPT | Performed by: INTERNAL MEDICINE

## 2018-02-01 RX ORDER — FUROSEMIDE 40 MG/1
40 TABLET ORAL
Status: DISCONTINUED | OUTPATIENT
Start: 2018-02-01 | End: 2018-02-03 | Stop reason: HOSPADM

## 2018-02-01 RX ORDER — ACETAZOLAMIDE 500 MG/5ML
500 INJECTION, POWDER, LYOPHILIZED, FOR SOLUTION INTRAVENOUS EVERY 24 HOURS
Status: DISCONTINUED | OUTPATIENT
Start: 2018-02-01 | End: 2018-02-01

## 2018-02-01 RX ORDER — NYSTATIN 100000 U/G
CREAM TOPICAL 2 TIMES DAILY
Status: DISCONTINUED | OUTPATIENT
Start: 2018-02-01 | End: 2018-02-03 | Stop reason: HOSPADM

## 2018-02-01 RX ADMIN — FUROSEMIDE 40 MG: 10 INJECTION, SOLUTION INTRAMUSCULAR; INTRAVENOUS at 09:57

## 2018-02-01 RX ADMIN — ENOXAPARIN SODIUM 40 MG: 40 INJECTION SUBCUTANEOUS at 09:57

## 2018-02-01 RX ADMIN — Medication 10 ML: at 06:20

## 2018-02-01 RX ADMIN — FLUTICASONE PROPIONATE 2 SPRAY: 50 SPRAY, METERED NASAL at 10:00

## 2018-02-01 RX ADMIN — TRAMADOL HYDROCHLORIDE 50 MG: 50 TABLET, FILM COATED ORAL at 06:18

## 2018-02-01 RX ADMIN — NYSTATIN: 100000 POWDER TOPICAL at 10:01

## 2018-02-01 RX ADMIN — ASPIRIN 81 MG: 81 TABLET, COATED ORAL at 09:59

## 2018-02-01 RX ADMIN — PANTOPRAZOLE SODIUM 40 MG: 40 TABLET, DELAYED RELEASE ORAL at 09:59

## 2018-02-01 RX ADMIN — WATER 500 MG: 1 INJECTION INTRAMUSCULAR; INTRAVENOUS; SUBCUTANEOUS at 12:22

## 2018-02-01 RX ADMIN — ALBUTEROL SULFATE 2.5 MG: 2.5 SOLUTION RESPIRATORY (INHALATION) at 15:42

## 2018-02-01 RX ADMIN — AMMONIUM LACTATE: 12 LOTION TOPICAL at 18:38

## 2018-02-01 RX ADMIN — AMMONIUM LACTATE: 12 LOTION TOPICAL at 09:59

## 2018-02-01 RX ADMIN — METOPROLOL TARTRATE 12.5 MG: 25 TABLET ORAL at 18:36

## 2018-02-01 RX ADMIN — TRAMADOL HYDROCHLORIDE 50 MG: 50 TABLET, FILM COATED ORAL at 18:36

## 2018-02-01 RX ADMIN — ENOXAPARIN SODIUM 40 MG: 40 INJECTION SUBCUTANEOUS at 20:42

## 2018-02-01 RX ADMIN — Medication 10 ML: at 16:21

## 2018-02-01 RX ADMIN — FUROSEMIDE 40 MG: 10 INJECTION, SOLUTION INTRAMUSCULAR; INTRAVENOUS at 16:21

## 2018-02-01 RX ADMIN — Medication 10 ML: at 12:26

## 2018-02-01 NOTE — PROGRESS NOTES
Hospitalist Progress Note    NAME: Alex Hopkins   :  1970   MRN:  [id number]       Assessment / Plan:  Acute respiratory distress with acute on chronic hypoxic/hypercarbic respiratory failure, pulmonary HTN, obesity hypoventilation and GEN/noncompliance with CPAP: Baseline O2 5 LPM;  Pt says that she still has not been using her trilogy machine after recent discharge. Discussed with her multiple times this admit, she says she now understands that she needs to use her machine - although she did have it in her room last night and chose to use hospital BiPAP instead. - CXR with mild pulmonary edema versus interstitial pneumonia. New since 2 days ago. - echo  with EF 55-60%. Suboptimal endocardial visualization limits wall motion analysis.  +pulmonary HTN on CT chest last admission.  - con't diuresis with lasix, changing back to po. Diamox today ordered. - con't BiPAP at night and when sleeping - issues with compliance as discussed above; appreciate pulmonology assistance  - change nebs to duonebs scheduled with prn albuterol  - PT/OT yousufals completed, not recommending rehab  Noninsulin dependent DM2 controlled without complication:  - con't home glyburide  - prn lispro sliding scale  Benign HTN:  BP remains low  - con't holding lisinopril - probably will not be able to restart on discharge due to low BP  - reduced metoprolol  - con't diuresis as able  - prn nitrbid  Lymphedema with chronic LLE wound:  lac-hydrin ordered  COPD without acute exacerbation: nebs as above. Supermorbid obesity (body mass index is 64.09 kg/(m^2)      Code Status: Full  DVT Prophylaxis: lovenox     Subjective:     Chief Complaint / Reason for Physician Visit  \"I need to go home by Saturday\". Complains of chronic pain on her R side. Discussed with RN events overnight.      Review of Systems:  Symptom Y/N Comments  Symptom Y/N Comments   Fever/Chills n   Chest Pain y    Poor Appetite n   Edema n    Cough n   Abdominal Pain n    Sputum n   Joint Pain     SOB/RAZO y   Pruritis/Rash     Nausea/vomit    Tolerating PT/OT     Diarrhea    Tolerating Diet     Constipation    Other       Could NOT obtain due to:      Objective:     VITALS:   Last 24hrs VS reviewed since prior progress note. Most recent are:  Patient Vitals for the past 24 hrs:   Temp Pulse Resp BP SpO2   02/01/18 0750 98.4 °F (36.9 °C) (!) 107 18 100/62 93 %   02/01/18 0332 99 °F (37.2 °C) 100 18 122/77 94 %   01/31/18 2259 98.3 °F (36.8 °C) 92 18 117/64 92 %   01/31/18 1927 97.6 °F (36.4 °C) 84 20 106/74 96 %   01/31/18 1727 98.3 °F (36.8 °C) 92 18 122/78 92 %   01/31/18 1426 - - - - 94 %   01/31/18 1253 98 °F (36.7 °C) 91 18 98/77 90 %       Intake/Output Summary (Last 24 hours) at 02/01/18 1008  Last data filed at 01/31/18 2004   Gross per 24 hour   Intake              200 ml   Output             1525 ml   Net            -1325 ml        PHYSICAL EXAM:  General: Obese. Alert, cooperative, no acute distress    EENT:  EOMI. Anicteric sclerae. MMM  Resp:  CTA bilaterally, no wheezing or rales. Poor air movement. No accessory muscle use  CV:  Regular rhythm,  No edema  GI:  Soft, moderately distended, Non tender.  +Bowel sounds  Neurologic:  Alert and oriented X 3, normal speech,   Psych:   Poor insight. Not anxious nor agitated  Skin:  No rashes. No jaundice    Reviewed most current lab test results and cultures  YES  Reviewed most current radiology test results   YES  Review and summation of old records today    NO  Reviewed patient's current orders and MAR    YES  PMH/SH reviewed - no change compared to H&P  ________________________________________________________________________  Care Plan discussed with:    Comments   Patient x    Family      RN x    Care Manager x    Consultant                       x Multidiciplinary team rounds were held today with , nursing, pharmacist and clinical coordinator.   Patient's plan of care was discussed; medications were reviewed and discharge planning was addressed. ________________________________________________________________________  Total NON critical care TIME:  25 Minutes    Total CRITICAL CARE TIME Spent:   Minutes non procedure based      Comments   >50% of visit spent in counseling and coordination of care x    ________________________________________________________________________  Lionel Persaud MD     Procedures: see electronic medical records for all procedures/Xrays and details which were not copied into this note but were reviewed prior to creation of Plan. LABS:  I reviewed today's most current labs and imaging studies.   Pertinent labs include:  Recent Labs      01/31/18   0533  01/29/18   2332   WBC  8.8  8.5   HGB  13.8  14.6   HCT  45.5  48.7*   PLT  122*  131*     Recent Labs      01/31/18   0533  01/29/18   2355   NA  136  139   K  3.9  4.3   CL  96*  100   CO2  37*  36*   GLU  92  109*   BUN  20  19   CREA  0.75  0.88   CA  9.4  9.6   ALB  2.8*  3.2*   TBILI  0.5  0.4   SGOT  16  18   ALT  24  27       Signed: Lionel Persaud MD

## 2018-02-01 NOTE — PROGRESS NOTES
[facility] Consult Service Note  Pulmonary, Critical Care, and Sleep Medicine    Name: Daisy Nowak MRN: [id number]   : 1970 Hospital: [address] [facility]   Date: 2018   Hospital Day: 4       Subjective/Interval History:   I have reviewed the notes from other providers and old records readily available and summarized findings below with the flowsheet. Seen earlier today on rounds. IMPRESSION:   1. Acute on Chronic hypoxic/hypercapnic respiratory failure on home O2 5 LPM- now on 6 LPm and sasts 88-92% at rest  2. GEN/OHS - followed by Dr. Cuong nicole, not compliant with her home Trilogy (currently attributes this to not liking her full face mask)- yet to see him in followup  3. COPD/asthma - no prior PFTs, no evidence of exacerbation with exacerbation   4. CAD- h/o stents  5. HTN  6. DM  7. Active Tobacco use  8. H/o SVT (supraventricular tachycardia)on BB  9. HTN  10. DM 2 controlled without complication: glyburide   11. Lymphedema with chronic LLE wound:  lac-hydrin ordered  12. Supermorbid obesity (body mass index is 64.56 kg/(m^2) at very high risk for volume overload and fluid retention- looking back on weights; one year ago PCO2 usually less than 60; does better when weight less than 400 pounds; as much as 445 last Summer; now 372?   13. Prognosis guarded. 14. Discussed with nurse this am.       RECOMMENDATIONS/PLAN:   1. Avoid metabolic alkalosis with diuresis-  2. Appreciate Case Management's help- pt not using services which are provided by Medicaid;   3. Using her home Trilogy with the help of respiratory   4. She needs f/u with sleep doctor (Dr. Prema Benavides)   5. Recommended better compliance to reduce hospitalizations and help with edema  6. Mobilize  7. ABG this am is probably her baseline  8. Tobacco cessation counseling   9. Weight reduction counseling  10.  Pt education-she must use her NIV device as much as possible 8+ hours or more to help keep PCO2 from climbing otherwise risk coma, death, CVA, MI  6. Prescription drug management with home med reconciliation done          My assessment/ and management was discussed with:  nursing    respiratory therapy Dr.   family      Pt's condition is acute and unstable requiring inpatient hospitalization. This care involved high complexity decision making which includes independently reviewing the patient's past medical records, current laboratory results, medication profiles that were immediately available to me and actual Xray images at the bedside in order to assess, support vital system function, and to treat this degree of vital organ system failure, and to prevent further deterioration of the patients condition.      Risk of deterioration: moderate  [x] High complexity decision making was performed  [x] See my orders for details      Subjective/Initial History:     No acute events overnight  No acute complaints    MAR reviewed and pertinent medications noted or modified as needed   Current Facility-Administered Medications   Medication    ammonium lactate (LAC-HYDRIN) 12 % lotion    metoprolol tartrate (LOPRESSOR) tablet 12.5 mg    albuterol-ipratropium (DUO-NEB) 2.5 MG-0.5 MG/3 ML    sodium chloride (NS) flush 5-10 mL    aspirin delayed-release tablet 81 mg    fluticasone (FLONASE) 50 mcg/actuation nasal spray 2 Spray    insulin lispro (HUMALOG) injection    glucose chewable tablet 16 g    dextrose (D50W) injection syrg 12.5-25 g    glucagon (GLUCAGEN) injection 1 mg    pantoprazole (PROTONIX) tablet 40 mg    traMADol (ULTRAM) tablet 50 mg    nitroglycerin (NITROBID) 2 % ointment 1 Inch    sodium chloride (NS) flush 5-10 mL    sodium chloride (NS) flush 5-10 mL    acetaminophen (TYLENOL) tablet 650 mg    ondansetron (ZOFRAN) injection 4 mg    enoxaparin (LOVENOX) injection 40 mg    furosemide (LASIX) injection 40 mg    albuterol (PROVENTIL VENTOLIN) nebulizer solution 2.5 mg    nystatin (MYCOSTATIN) 100,000 unit/gram powder           ROS:A comprehensive review of systems was negative except for that written in the HPI. Objective:     Vital Signs: Intake/Output: Intake/Output:   Temp (24hrs), Av.3 °F (36.8 °C), Min:97.6 °F (36.4 °C), Max:99 °F (37.2 °C)     Visit Vitals    /62    Pulse (!) 107    Temp 98.4 °F (36.9 °C)    Resp 18    Ht 5' 6\" (1.676 m)    Wt (!) 180 kg (396 lb 13.3 oz)    SpO2 93%  Comment: 6 liters    BMI 64.05 kg/m2        Telemetry:    normal sinus rhythm O2 Device: Nasal cannula  O2 Flow Rate (L/min): 6 l/min  Wt Readings from Last 4 Encounters:   18 (!) 180 kg (396 lb 13.3 oz)   18 (!) 177.4 kg (391 lb)   18 (!) 177.9 kg (392 lb 2 oz)   17 (!) 201.9 kg (445 lb)          Intake/Output Summary (Last 24 hours) at 18 0935  Last data filed at 18   Gross per 24 hour   Intake              200 ml   Output             1525 ml   Net            -1325 ml     Last shift:         Last 3 shifts:  1901 -  0700  In: 790 [P.O.:790]  Out: 2625 [Urine:2625]     Physical Exam:    General: alert in no distress   HEAD: Normocephalic, without obvious abnormality, atraumatic   EYES: conjunctivae clear. PERRL,  AN Icteric sclerae   NOSE: nares normal, no drainage, no nasal flaring,    THROAT: ; Lips, mucosa dry; No Thrush; class  airway; dentures;  tongue midline   Neck: Supple, symmetrical, trachea midline,  No accessory mm use; No Stridor/ cuff leak, No goiter or thyroid tenderness   LYMPH:  in neck or groin   Chest: increased AP diameter   Lungs: decreased air exchange bilaterally   Heart: Regular rate and rhythm; severe edema   Abdomen: soft, non-tender, without masses or organomegaly, OBESE   : deferred;    Extremity: negative, clubbing; no joint swelling or erythema   Neuro: alert; speech fluent ;verbal; withdraws to pain; unable to check gait and station   Psych: oriented to time, place and person; easily agititated   Skin: Warm; thicked, brawny, verucous changes BLE   Pulses:Bilateral, Radial, 2+   Capillary refill: normal; well perfused,    Data:     All lab results for the last 24 hours reviewed. Labs:    Recent Labs      01/31/18   0533  01/29/18   2332   WBC  8.8  8.5   HGB  13.8  14.6   PLT  122*  131*     Recent Labs      01/31/18   0533  01/30/18   0126  01/29/18   2355   NA  136   --   139   K  3.9   --   4.3   CL  96*   --   100   CO2  37*   --   36*   GLU  92   --   109*   BUN  20   --   19   CREA  0.75   --   0.88   CA  9.4   --   9.6   LAC   --   0.6   --    ALB  2.8*   --   3.2*   SGOT  16   --   18   ALT  24   --   27     Recent Labs      02/01/18   0845  01/30/18   1153  01/30/18   0857   PH  7.39  7.33*  7.29*   PCO2  63*  71*  85*   PO2  60*  67*  85   HCO3  37*  36*  40*   FIO2   --   40  50     Recent Labs      01/29/18   2355   CPK  30   CKNDX  Cannot be calculated   TROIQ  <0.04     Lab Results   Component Value Date/Time    BNP 33 01/12/2016 02:54 AM      Lab Results   Component Value Date/Time    Culture result: NO GROWTH 2 DAYS 01/30/2018 01:47 AM    Culture result: NO GROWTH 2 DAYS 01/30/2018 01:26 AM    Culture result: MODERATE NORMAL RESPIRATORY AMRIK 09/24/2017 10:56 PM    Culture result: LIGHT ACINETOBACTER BAUMANNII/HAEMOLYTICUS 09/24/2017 10:56 PM    Culture result: SCANT SERRATIA MARCESCENS 09/24/2017 10:56 PM       Lab Results   Component Value Date/Time    TSH 1.79 11/13/2014 12:00 AM       Imaging:            I have personally and independently reviewed the patients interval and diagnostic data, radiographs and have reviewed the reports. I have ordered additional labs to follow the current medical conditions and to monitor treatment responses over the next 24 hours or sooner if needed. If the pt needs,  additional imaging will be obtained to follow longitudinal changes found on the most current imaging.       Thank you for allowing us to participate in the care of this patient. We will be happy to follow with you.     Walter Baron MD

## 2018-02-01 NOTE — PROGRESS NOTES
PCU SHIFT NURSING NOTE      Bedside verbal shift change report given to Elias Wesley (oncoming nurse) by Michelle Norris (offgoing nurse). Report included the following information SBAR, Kardex, Intake/Output, Recent Results and Cardiac Rhythm NSR. Shift Summary:   1927: Patient agitated and expressed desire to have another speciality bed that would be closer to the ground so that when she gets up, her feet are touching the floor. She stated she had requested another bed all day. Patient states she needs a \"larger bed\" due to her size, but wants the height to be lower to the ground. Notified patient that appropriate staff will be notified to see what bed would be appropriate for her condition. Patient refused wound care until she could have an appropriate bed.     0700: Attempted to receive morning lab work and unsuccessful. 0730: Notified Michelle Norris regarding inability to receive a.m. Lab work. Admission Date 1/29/2018   Admission Diagnosis Acute on chronic respiratory failure with hypoxia and hypercapnia ([facility] [address]   Consults IP CONSULT TO INTENSIVIST  IP CONSULT TO PULMONOLOGY        Consults   []PT   []OT   []Speech   []Case Management      [] Palliative      Cardiac Monitoring Order   []Yes   []No     IV drips   []Yes    Drip:                            Dose:  Drip:                            Dose:  Drip:                            Dose:   []No     GI Prophylaxis   []Yes   []No         DVT Prophylaxis   SCDs:             Haroon stockings:         [] Medication   []Contraindicated   []None      Activity Level Activity Level: Up with Assistance     Activity Assistance: Partial (one person)   Purposeful Rounding every 1-2 hour?    []Yes   Ogden Score  Total Score: 2   Bed Alarm (If score 3 or >)   []Yes   [] Refused (See signed refusal form in chart)   Jeffrey Score  Jeffrey Score: 17   Jeffrey Score (if score 14 or less)   []PMT consult   []Wound Care consult      []Specialty bed   [] Nutrition consult          Needs prior to discharge:   Home O2 required:    []Yes   []No    If yes, how much O2 required? Other:    Last Bowel Movement: Last Bowel Movement Date: 01/29/18 (per patient )      Influenza Vaccine          Pneumonia Vaccine           Diet Active Orders   Diet    DIET DIABETIC CONSISTENT CARB Regular; 2 GM NA (House Low NA); FR 1800ML      LDAs               Peripheral IV 01/29/18 Right Antecubital (Active)   Site Assessment Clean, dry, & intact 1/31/2018  7:27 PM   Phlebitis Assessment 0 1/31/2018  7:27 PM   Infiltration Assessment 0 1/31/2018  7:27 PM   Dressing Status Clean, dry, & intact 1/31/2018  7:27 PM   Dressing Type Tape;Transparent 1/31/2018  7:27 PM   Hub Color/Line Status Pink;Flushed 1/31/2018  7:27 PM   Alcohol Cap Used Yes 1/31/2018  6:00 AM                      Urinary Catheter      Intake & Output   Date 01/30/18 1900 - 01/31/18 0659 01/31/18 0700 - 02/01/18 0659   Shift 9470-8824 24 Hour Total 4707-3317 8062-4114 24 Hour Total   I  N  T  A  K  E   P. O. 540 540 250  250      P. O. 540 540 250  250    Shift Total  (mL/kg) 540  (3.2) 540  (3.2) 250  (1.4)  250  (1.4)   O  U  T  P  U  T   Urine  (mL/kg/hr) 350 1500 1375  (0.6) 900 2275      Urine Voided 350 1500 8991 132 0094      Urine Occurrence(s) 3 x 3 x       Shift Total  (mL/kg) 350  (2.1) 1500  (8.9) 1375  (7.6) 900  (5) 2275  (12.6)    -960 -1125 -900 -2025   Weight (kg) 168.9 168.9 180.5 180.5 180.5         Readmission Risk Assessment Tool Score High Risk            22       Total Score        3 Has Seen PCP in Last 6 Months (Yes=3, No=0)    4 IP Visits Last 12 Months (1-3=4, 4=9, >4=11)    9 Pt. Coverage (Medicare=5 , Medicaid, or Self-Pay=4)    6 Charlson Comorbidity Score (Age + Comorbid Conditions)        Criteria that do not apply:    . Living with Significant Other. Assisted Living. LTAC. SNF.  or   Rehab    Patient Length of Stay (>5 days = 3)       Expected Length of Stay 4d 12h   Actual Length of Stay 1

## 2018-02-01 NOTE — CDMP QUERY
Account Number: [de-identified]  MRN: [id number]  Patient: Sonia Parker  Created: [id number]  Clinician Name: Valerie Aguillon RN, CCDS   ELENA Alarcon :  CMS considers documentation to be conflicting in nature when one condition is diagnosed as two different conditions by the same or different physicians  Documentation of Acute respiratory Failure d/t Acute on chronic Diastolic CHF is noted as the admitting diagnosis. Current documentation does not clinically support Acute CHF and left off. Please clarify the following:      => Acute on chronic diastolic heart failure ruled out  => Chronic Diastolic CHF   => Acute pulmonary edema d/t GEN/OHS & obesity   => Other explanation of clinical findings  => Unable to determine (no explanation for clinical findings)    The medical record reflects the following clinical findings, treatment, and risk factors. Risk Factors:  super morbid obesity, non compliance w/ trilogy, OHS & GEN, lymphedema w/ chronic LLE wound  Clinical Indicators:  42 yo F w/ SOB past couple weeks worse w/ exertion, NTpBNP 378, CXR with mild pulmonary edema versus interstitial pneumonia. New since 2 days ago. Treatment: IV lasix, O2 support, repeat echo, no IV abx, ABG. Please clarify and document your clinical opinion in the progress notes and discharge summary including the definitive and/or presumptive diagnosis, (suspected or probable), related to the above clinical findings. Please include clinical findings supporting your diagnosis.   Thank Maude Ulrich RN, CCDS

## 2018-02-01 NOTE — PROGRESS NOTES
Wound Care Nurse spoke with patient about her bariatric bed. Patient says that it is uncomfortable and wants another bed because her back hurts now. She weighs 396 lbs and can be placed on a regular Advantage Bed (the older models) with the thicker mattress. Spoke with the Clinical Care Lead this morning and she will make sure the patient gets the older bed.    Daisha Amado RN, BSN, [employer]

## 2018-02-02 LAB
ANION GAP SERPL CALC-SCNC: 6 MMOL/L (ref 5–15)
BUN SERPL-MCNC: 22 MG/DL (ref 6–20)
BUN/CREAT SERPL: 26 (ref 12–20)
CALCIUM SERPL-MCNC: 10 MG/DL (ref 8.5–10.1)
CHLORIDE SERPL-SCNC: 98 MMOL/L (ref 97–108)
CO2 SERPL-SCNC: 31 MMOL/L (ref 21–32)
CREAT SERPL-MCNC: 0.85 MG/DL (ref 0.55–1.02)
ERYTHROCYTE [DISTWIDTH] IN BLOOD BY AUTOMATED COUNT: 13.2 % (ref 11.5–14.5)
GLUCOSE BLD STRIP.AUTO-MCNC: 110 MG/DL (ref 65–100)
GLUCOSE BLD STRIP.AUTO-MCNC: 120 MG/DL (ref 65–100)
GLUCOSE BLD STRIP.AUTO-MCNC: 156 MG/DL (ref 65–100)
GLUCOSE BLD STRIP.AUTO-MCNC: 175 MG/DL (ref 65–100)
GLUCOSE SERPL-MCNC: 112 MG/DL (ref 65–100)
HCT VFR BLD AUTO: 45.2 % (ref 35–47)
HGB BLD-MCNC: 13.5 G/DL (ref 11.5–16)
MCH RBC QN AUTO: 27.8 PG (ref 26–34)
MCHC RBC AUTO-ENTMCNC: 29.9 G/DL (ref 30–36.5)
MCV RBC AUTO: 93 FL (ref 80–99)
NRBC # BLD: 0 K/UL (ref 0–0.01)
NRBC BLD-RTO: 0 PER 100 WBC
PLATELET # BLD AUTO: 177 K/UL (ref 150–400)
PMV BLD AUTO: 11.2 FL (ref 8.9–12.9)
POTASSIUM SERPL-SCNC: 3.8 MMOL/L (ref 3.5–5.1)
RBC # BLD AUTO: 4.86 M/UL (ref 3.8–5.2)
SERVICE CMNT-IMP: ABNORMAL
SODIUM SERPL-SCNC: 135 MMOL/L (ref 136–145)
WBC # BLD AUTO: 9.5 K/UL (ref 3.6–11)

## 2018-02-02 PROCEDURE — 36415 COLL VENOUS BLD VENIPUNCTURE: CPT | Performed by: INTERNAL MEDICINE

## 2018-02-02 PROCEDURE — 65660000000 HC RM CCU STEPDOWN

## 2018-02-02 PROCEDURE — 74011250636 HC RX REV CODE- 250/636: Performed by: INTERNAL MEDICINE

## 2018-02-02 PROCEDURE — 74011250637 HC RX REV CODE- 250/637: Performed by: INTERNAL MEDICINE

## 2018-02-02 PROCEDURE — 74011000250 HC RX REV CODE- 250: Performed by: INTERNAL MEDICINE

## 2018-02-02 PROCEDURE — 74011636637 HC RX REV CODE- 636/637: Performed by: INTERNAL MEDICINE

## 2018-02-02 PROCEDURE — 85027 COMPLETE CBC AUTOMATED: CPT | Performed by: INTERNAL MEDICINE

## 2018-02-02 PROCEDURE — 77010033678 HC OXYGEN DAILY

## 2018-02-02 PROCEDURE — 36600 WITHDRAWAL OF ARTERIAL BLOOD: CPT

## 2018-02-02 PROCEDURE — 82962 GLUCOSE BLOOD TEST: CPT

## 2018-02-02 PROCEDURE — 94640 AIRWAY INHALATION TREATMENT: CPT

## 2018-02-02 PROCEDURE — 80048 BASIC METABOLIC PNL TOTAL CA: CPT | Performed by: INTERNAL MEDICINE

## 2018-02-02 RX ADMIN — METOPROLOL TARTRATE 12.5 MG: 25 TABLET ORAL at 17:34

## 2018-02-02 RX ADMIN — WATER 500 MG: 1 INJECTION INTRAMUSCULAR; INTRAVENOUS; SUBCUTANEOUS at 16:12

## 2018-02-02 RX ADMIN — TRAMADOL HYDROCHLORIDE 50 MG: 50 TABLET, FILM COATED ORAL at 11:06

## 2018-02-02 RX ADMIN — AMMONIUM LACTATE: 12 LOTION TOPICAL at 09:06

## 2018-02-02 RX ADMIN — INSULIN LISPRO 2 UNITS: 100 INJECTION, SOLUTION INTRAVENOUS; SUBCUTANEOUS at 09:04

## 2018-02-02 RX ADMIN — Medication 10 ML: at 21:17

## 2018-02-02 RX ADMIN — Medication 10 ML: at 16:14

## 2018-02-02 RX ADMIN — INSULIN LISPRO 2 UNITS: 100 INJECTION, SOLUTION INTRAVENOUS; SUBCUTANEOUS at 13:26

## 2018-02-02 RX ADMIN — PANTOPRAZOLE SODIUM 40 MG: 40 TABLET, DELAYED RELEASE ORAL at 09:05

## 2018-02-02 RX ADMIN — NYSTATIN: 100000 CREAM TOPICAL at 17:30

## 2018-02-02 RX ADMIN — ALBUTEROL SULFATE 2.5 MG: 2.5 SOLUTION RESPIRATORY (INHALATION) at 18:14

## 2018-02-02 RX ADMIN — METOPROLOL TARTRATE 12.5 MG: 25 TABLET ORAL at 09:05

## 2018-02-02 RX ADMIN — ENOXAPARIN SODIUM 40 MG: 40 INJECTION SUBCUTANEOUS at 21:17

## 2018-02-02 RX ADMIN — ENOXAPARIN SODIUM 40 MG: 40 INJECTION SUBCUTANEOUS at 09:05

## 2018-02-02 RX ADMIN — FUROSEMIDE 40 MG: 40 TABLET ORAL at 09:05

## 2018-02-02 RX ADMIN — FLUTICASONE PROPIONATE 2 SPRAY: 50 SPRAY, METERED NASAL at 09:07

## 2018-02-02 RX ADMIN — ASPIRIN 81 MG: 81 TABLET, COATED ORAL at 09:05

## 2018-02-02 NOTE — PROGRESS NOTES
Patient admitted on 2018 on Contact Isolation for ESBL    CM met with patient for initial assessment. Pt name and  confirmed as patient identifiers. Demographics confirmed. Lives alone. Family checks on patient daily. [facility] 7 days  9-3pm.   [facility]  Assists with bathing dressing and feeding - ADL's/IADL's  Patient informs CM \" I can ransfer, get water, can get up to walk independently\"    DME at home -  hospital bed, bsc, rollator, shower chair, trilogy, home O2. Nebulizer. Transfer, get water, can get up to walk independently    O2 - [facility] provider [address]     Patient is not sure about transport to/from appointments    PCP -Visiting physician    [facility] prior to admission - [place]. Skilled Nursing for wound care    Medical transport at time of discharge. Patient informed CM that agency [facility] works with cannot come out same day of discharge. Yudy Acevedo is provider of [facility]. Patient informed CM  \"I have never needed anyone at the house before when I have been taken home. \"  Cm explained that medical transport will not deliver her to an empty home without someone there. CM will continue with discharge plans. Physicians v isit with patient wiithin 24 hours. [employer] contract to provide (per Dr. Rudy Conti)    Patient has trilogy in room. Will need to be transported home with patient at time of discharge. CM will continue to follow. MD at bedside. Barriers to discharge -  [facility] provider  Oxygen provider and confirmation of concentrator to provide 10L  Patient informed CM she will need medical transport home and no one will be available to accept her at the home. Care Management Interventions  PCP Verified by CM: Yes ([facility])  Palliative Care Criteria Met (RRAT>21 & CHF Dx)?: No  Mode of Transport at Discharge:  Other (see comment) ([facility] )  Transition of Care Consult (CM Consult): Discharge Planning (tbd - patient was with [facility] prior to admission )  Discharge Durable Medical Equipment: Yes (reach aide)  Physical Therapy Consult: Yes  Occupational Therapy Consult: Yes  Confirm Follow Up Transport: Other (see comment) ([employer])  Plan discussed with Pt/Family/Caregiver: Yes  Discharge Location  Discharge Placement: Home with home health      Arch Dates, [address] ([facility])

## 2018-02-02 NOTE — PROGRESS NOTES
[facility] Pulmonary Consult Service Note  Pulmonary, Critical Care, and Sleep Medicine    Name: Alisha Polk MRN: [id number]   : 1970 Hospital: [address] [facility]   Date: 2018   Hospital Day: 5       Subjective/Interval History:   I have reviewed the notes from other providers and old records readily available and summarized findings below with the flowsheet. Seen earlier today on rounds. No acute events overnight  No acute distress  No acute complaints    IMPRESSION:   1. Acute on Chronic hypoxic/hypercapnic respiratory failure on home O2 5 LPM- now on 6 LPm and sasts 88-92% at rest  2. GEN/OHS - followed by Dr. Suresh nicole, not compliant with her home Trilogy (currently attributes this to not liking her full face mask)- yet to see him in followup  3. COPD/asthma - no prior PFTs, no evidence of exacerbation with exacerbation   4. CAD- h/o stents  5. HTN  6. DM  7. Active Tobacco use  8. H/o SVT (supraventricular tachycardia)on BB  9. HTN  10. DM 2 controlled without complication: glyburide   11. Lymphedema with chronic LLE wound:  lac-hydrin ordered  12. Supermorbid obesity (body mass index is 64.56 kg/(m^2) at very high risk for volume overload and fluid retention- looking back on weights; one year ago PCO2 usually less than 60; does better when weight less than 400 pounds; as much as 445 last Summer; now 372?   13. Prognosis guarded. 14. Discussed with nurse this am.       RECOMMENDATIONS/PLAN:   1. Appreciate Case Management's help- pt not using services which are provided by Medicaid;   2. Using her home Trilogy with the help of respiratory   3. She needs f/u with sleep doctor (Dr. Bere Chirinos)   4. Recommended better compliance to reduce hospitalizations and help with edema  5. Mobilize  6. Tobacco cessation counseling   7. Weight reduction counseling  8.  Pt education-she must use her NIV device as much as possible 8+ hours or more to help keep PCO2 from climbing otherwise risk coma, death, CVA, MI  5. Prescription drug management with home med reconciliation done  10. Home??  11. Will sign off          My assessment/ and management was discussed with:  nursing    respiratory therapy Dr.   family      Pt's condition is acute and unstable requiring inpatient hospitalization. This care involved high complexity decision making which includes independently reviewing the patient's past medical records, current laboratory results, medication profiles that were immediately available to me and actual Xray images at the bedside in order to assess, support vital system function, and to treat this degree of vital organ system failure, and to prevent further deterioration of the patients condition.      Risk of deterioration: moderate  [x] High complexity decision making was performed  [x] See my orders for details      Subjective/Initial History:     No acute events overnight  No acute complaints    MAR reviewed and pertinent medications noted or modified as needed   Current Facility-Administered Medications   Medication    acetaZOLAMIDE (DIAMOX) 500 mg in sterile water (preservative free) 5 mL injection    influenza vaccine 2017-18 (3 yrs+)(PF) (FLUZONE QUAD/FLUARIX QUAD) injection 0.5 mL    furosemide (LASIX) tablet 40 mg    nystatin (MYCOSTATIN) 100,000 unit/gram cream    ammonium lactate (LAC-HYDRIN) 12 % lotion    metoprolol tartrate (LOPRESSOR) tablet 12.5 mg    albuterol-ipratropium (DUO-NEB) 2.5 MG-0.5 MG/3 ML    sodium chloride (NS) flush 5-10 mL    aspirin delayed-release tablet 81 mg    fluticasone (FLONASE) 50 mcg/actuation nasal spray 2 Spray    insulin lispro (HUMALOG) injection    glucose chewable tablet 16 g    dextrose (D50W) injection syrg 12.5-25 g    glucagon (GLUCAGEN) injection 1 mg    pantoprazole (PROTONIX) tablet 40 mg    traMADol (ULTRAM) tablet 50 mg    nitroglycerin (NITROBID) 2 % ointment 1 Inch    sodium chloride (NS) flush 5-10 mL    sodium chloride (NS) flush 5-10 mL    acetaminophen (TYLENOL) tablet 650 mg    ondansetron (ZOFRAN) injection 4 mg    enoxaparin (LOVENOX) injection 40 mg    albuterol (PROVENTIL VENTOLIN) nebulizer solution 2.5 mg           ROS:A comprehensive review of systems was negative except for that written in the HPI. Objective:     Vital Signs: Intake/Output: Intake/Output:   Temp (24hrs), Av.9 °F (36.6 °C), Min:97.6 °F (36.4 °C), Max:98.1 °F (36.7 °C)     Visit Vitals    /68 (BP 1 Location: Left arm, BP Patient Position: At rest)    Pulse 90    Temp 98.1 °F (36.7 °C)    Resp 18    Ht 5' 6\" (1.676 m)    Wt (!) 180 kg (396 lb 13.3 oz)    SpO2 (!) 85%    Breastfeeding No    BMI 64.05 kg/m2        Telemetry:    normal sinus rhythm O2 Device: Nasal cannula  O2 Flow Rate (L/min): 6 l/min  Wt Readings from Last 4 Encounters:   18 (!) 180 kg (396 lb 13.3 oz)   18 (!) 177.4 kg (391 lb)   18 (!) 177.9 kg (392 lb 2 oz)   17 (!) 201.9 kg (445 lb)          Intake/Output Summary (Last 24 hours) at 18 0859  Last data filed at 18 1840   Gross per 24 hour   Intake              410 ml   Output              800 ml   Net             -390 ml     Last shift:         Last 3 shifts: 1901 -  0700  In: 410 [P.O.:410]  Out: 1700 [Urine:1700]     Physical Exam:    General: alert in no distress   HEAD: Normocephalic, without obvious abnormality, atraumatic   EYES: conjunctivae clear. PERRL,  AN Icteric sclerae   NOSE: nares normal, no drainage, no nasal flaring,    THROAT: ; Lips, mucosa dry; No Thrush; class  airway; dentures;  tongue midline   Neck: Supple, symmetrical, trachea midline,  No accessory mm use;  No Stridor/ cuff leak, No goiter or thyroid tenderness   LYMPH:  in neck or groin   Chest: increased AP diameter   Lungs: decreased air exchange bilaterally   Heart: Regular rate and rhythm; severe edema   Abdomen: soft, non-tender, without masses or organomegaly, OBESE   : deferred; Extremity: negative, clubbing; no joint swelling or erythema   Neuro: alert; speech fluent ;verbal; withdraws to pain; unable to check gait and station   Psych: oriented to time, place and person; easily agititated   Skin: Warm; thicked, brawny, verucous changes BLE   Pulses:Bilateral, Radial, 2+   Capillary refill: normal; well perfused,    Data:     All lab results for the last 24 hours reviewed. Labs:    Recent Labs      02/02/18   0605  01/31/18   0533   WBC  9.5  8.8   HGB  13.5  13.8   PLT  177  122*     Recent Labs      02/02/18   0605  01/31/18   0533   NA  135*  136   K  3.8  3.9   CL  98  96*   CO2  31  37*   GLU  112*  92   BUN  22*  20   CREA  0.85  0.75   CA  10.0  9.4   ALB   --   2.8*   SGOT   --   16   ALT   --   24     Recent Labs      02/01/18   0845  01/30/18   1153   PH  7.39  7.33*   PCO2  63*  71*   PO2  60*  67*   HCO3  37*  36*   FIO2   --   40     No results for input(s): CPK, CKNDX, TROIQ in the last 72 hours. No lab exists for component: CPKMB  Lab Results   Component Value Date/Time    BNP 33 01/12/2016 02:54 AM      Lab Results   Component Value Date/Time    Culture result: NO GROWTH 2 DAYS 01/30/2018 01:47 AM    Culture result: NO GROWTH 2 DAYS 01/30/2018 01:26 AM    Culture result: MODERATE NORMAL RESPIRATORY AMRIK 09/24/2017 10:56 PM    Culture result: LIGHT ACINETOBACTER BAUMANNII/HAEMOLYTICUS 09/24/2017 10:56 PM    Culture result: SCANT SERRATIA MARCESCENS 09/24/2017 10:56 PM       Lab Results   Component Value Date/Time    TSH 1.79 11/13/2014 12:00 AM       Imaging:            I have personally and independently reviewed the patients interval and diagnostic data, radiographs and have reviewed the reports. I have ordered additional labs to follow the current medical conditions and to monitor treatment responses over the next 24 hours or sooner if needed.  If the pt needs,  additional imaging will be obtained to follow longitudinal changes found on the most current imaging. Thank you for allowing us to participate in the care of this patient. We will be happy to follow with you.     Radha Quan MD

## 2018-02-02 NOTE — PROGRESS NOTES
Received report from [address] [place] [address]. TONO PADRON, [facility], RECENT RESULTS. No Complaints of pain, nausea, or shortness of breath.

## 2018-02-02 NOTE — PROGRESS NOTES
Patient asked to speak with CM. Patient informed CM she wants to be discharged today. CM informed patient she would make attending aware. CM informed patient that transportation has been scheduled for 1st thing Saturday  Morning but patient does not want to stay another day in hospital.  CM asked patient if she edith have anyone to meet her at the home \"I am working on it\". CM will need to follow up with Mahad to confirm [facility] services. Patient informed. 1218pm - CM confirmed with [facility] they can provide service. All[employer] Alert: YES response from [facility] re: [id number]: Yes, willing to accept patient     1218pm - Attending informed that patient is requesting to leave today. CM informed patient of discharge planning and services. Patient is adamant about leaving today. \"I want to leave today\". I can have someone at my house today to meet me there\". Patient states she can have her aid at the house tomorrow if she is discharged today. Patient  Informed CM \"I want that doctor back here in this room now\".     CM acknowledged request.     Chino Valdez RN CM  Ext [phone]

## 2018-02-02 NOTE — PROGRESS NOTES
Patient is CHF Bundle. RRAT of 22. MD made aware. CM reviewed need for Palliative Care Consult. CM contacted O2 provider -  Alem Ochoa [phone]  Current order on file is for [facility]. Confirmed that patient has a 10L concentrator in the home. HH order to be submitted to [facility] - provider of services prior to admission. CM has placed call to son, Emergency contact - Samira Talavera to arrange for someone to be at the house at time of discharge to receive patient from medical transport. [facility] is medical transport provider. Attending has requested [facility] to be set up for patient to have an at home visit. Cm will continue to follow.     Preethi Brand RN CM  Ext [phone]

## 2018-02-02 NOTE — PROGRESS NOTES
Hospitalist Progress Note    NAME: Princess Rushing   :  1970   MRN:  [id number]       Assessment / Plan:  Acute respiratory distress with acute on chronic hypoxic/hypercarbic respiratory failure, pulmonary HTN, obesity hypoventilation and GEN/noncompliance with CPAP: Baseline O2 5 LPM;  Pt says that she still has not been using her trilogy machine after recent discharge - used for 2 short 2 hour time windows last night. Pt says she did not like it because it smells like nicotine (she used to smoke)   - CXR with mild pulmonary edema versus interstitial pneumonia. New since 2 days ago. - echo  with EF 55-60%. Suboptimal endocardial visualization limits wall motion analysis.  +pulmonary HTN on CT chest last admission.  - con't diuresis with lasix, changing back to po. Con't diamox. - con't trilogy at night and when sleeping - issues with compliance as discussed above; appreciate pulmonology assistance  - con't duonebs scheduled with prn albuterol  - PT/OT evals completed, not recommending rehab. Pt with tentative plan to return home tomorrow with HH/PT. Noninsulin dependent DM2 controlled without complication:  - con't home glyburide  - prn lispro sliding scale  Benign HTN:  BP remains low  - con't holding lisinopril - probably will not be able to restart on discharge due to low BP  - reduced dose metoprolol  - con't diuresis as able  - prn nitrbid  Lymphedema with chronic LLE wound:  lac-hydrin ordered  COPD without acute exacerbation: nebs as above. Supermorbid obesity (body mass index is 64.05 kg/(m^2)      Code Status: Full (son would be decision maker)  DVT Prophylaxis: lovenox     Subjective:     Chief Complaint / Reason for Physician Visit  \"I want to go home\". Discussed with RN events overnight.      Review of Systems:  Symptom Y/N Comments  Symptom Y/N Comments   Fever/Chills n   Chest Pain y    Poor Appetite n   Edema n    Cough n   Abdominal Pain n    Sputum n   Joint Pain     SOB/RAZO y Pruritis/Rash     Nausea/vomit    Tolerating PT/OT     Diarrhea    Tolerating Diet     Constipation    Other       Could NOT obtain due to:      Objective:     VITALS:   Last 24hrs VS reviewed since prior progress note. Most recent are:  Patient Vitals for the past 24 hrs:   Temp Pulse Resp BP SpO2   02/02/18 0017 98.1 °F (36.7 °C) 90 18 121/68 (!) 85 %   02/01/18 2042 97.6 °F (36.4 °C) 96 18 125/76 100 %   02/01/18 1619 97.9 °F (36.6 °C) 92 18 103/72 97 %   02/01/18 1618 - 86 - - 97 %   02/01/18 1542 - - - - 92 %   02/01/18 1224 - (!) 120 - - 93 %   02/01/18 1222 97.9 °F (36.6 °C) (!) 118 18 117/77 93 %       Intake/Output Summary (Last 24 hours) at 02/02/18 0901  Last data filed at 02/01/18 1840   Gross per 24 hour   Intake              410 ml   Output              800 ml   Net             -390 ml        PHYSICAL EXAM:  General: Obese. Alert, cooperative, no acute distress    EENT:  EOMI. Anicteric sclerae. MMM  Resp:  CTA bilaterally, no wheezing or rales. Stable poor air movement. No accessory muscle use  CV:  Regular  rhythm,  No edema  GI:  Soft, moderately distended, Non tender.  +Bowel sounds  Neurologic:  Alert and oriented X 3, normal speech,   Psych:   Poor insight. Not anxious nor agitated  Skin:  No rashes. No jaundice    Reviewed most current lab test results and cultures  YES  Reviewed most current radiology test results   YES  Review and summation of old records today    NO  Reviewed patient's current orders and MAR    YES  PMH/SH reviewed - no change compared to H&P  ________________________________________________________________________  Care Plan discussed with:    Comments   Patient x    Family      RN x    Care Manager x    Consultant                        Multidiciplinary team rounds were held today with , nursing, pharmacist and clinical coordinator. Patient's plan of care was discussed; medications were reviewed and discharge planning was addressed. ________________________________________________________________________  Total NON critical care TIME:  35 Minutes    Total CRITICAL CARE TIME Spent:   Minutes non procedure based      Comments   >50% of visit spent in counseling and coordination of care x    ________________________________________________________________________  Itz Connor MD     Procedures: see electronic medical records for all procedures/Xrays and details which were not copied into this note but were reviewed prior to creation of Plan. LABS:  I reviewed today's most current labs and imaging studies.   Pertinent labs include:  Recent Labs      02/02/18   0605  01/31/18   0533   WBC  9.5  8.8   HGB  13.5  13.8   HCT  45.2  45.5   PLT  177  122*     Recent Labs      02/02/18   0605  01/31/18   0533   NA  135*  136   K  3.8  3.9   CL  98  96*   CO2  31  37*   GLU  112*  92   BUN  22*  20   CREA  0.85  0.75   CA  10.0  9.4   ALB   --   2.8*   TBILI   --   0.5   SGOT   --   16   ALT   --   24       Signed: Itz Connor MD

## 2018-02-02 NOTE — PROGRESS NOTES
Patient informed attending she \"might consider [address] [place] [address] at a later time and would be interested in an admission from home to [address] [place] [address]. Cm confirmed with NORMA Henao  that they do have a direct admit program from home. Cm asked patient if she wanted liaison to speak with her about it and patient declined. \"I don't want to talk to anyone\". CM informed Liaison and she will leave information packet with CM to be given to patient. [address] from [address] [place] [address] given to patient [facility].      Jose Mata RN CM  Ext [phone]

## 2018-02-02 NOTE — ROUTINE PROCESS
Scheduled Post Acute LETICIA appointment with Chris Delgadillo on 2/4/18. [facility] will contact pt on 2/3/18 to confirm appointment date and to set appointment time. Contacted Dr. Kelle Youssef office. Practice will contact pt to set up follow up visit.   Information added to AVS.  Mily Anna CM Specialist.

## 2018-02-02 NOTE — PROGRESS NOTES
Rogelio has called [facility] to confirm services for personal caregiver and availability. Left message with Julien Zuluaga. Shanna Rosario will return call to confirm.     Ham Sunshine RN CM  Ext [phone]

## 2018-02-02 NOTE — PROGRESS NOTES
Provider for medical transport -  [facility]   [phone]    CM attempting to arrange transport for discharge. Hold time - 35 minutes -  Multiple transfers    Accurate number and extension  [phone] ext [phone]  Direct # is [phone] for [facility] Medicaid transportation. CM requested medical transport for 2/3/2018 discharge time 10:00am  Needs:  [facility] transport  Nurses station number provided [phone]  Liaison informed patient height 5/6\" and patient weight 396#  5 steps entry as reported by patient    Transport provider will be [facility]  [phone]. Please contact if there are any changes in time of discharge. Confirmation # for transport is [id number]      Home Health order submitted to [facility] via ecin.   [phone]

## 2018-02-02 NOTE — PROGRESS NOTES
ADULT PROTOCOL: JET AEROSOL  REASSESSMENT    Patient  Aristides Obrien     41 y.o.   female     2/2/2018  6:23 PM    Breath Sounds Pre Procedure: Right Breath Sounds: Diminished, Expiratory wheezing                               Left Breath Sounds: Diminished, Expiratory wheezing    Breath Sounds Post Procedure: Right Breath Sounds: Diminished, Expiratory wheezing                                 Left Breath Sounds: Diminished, Expiratory wheezing    Breathing pattern: Pre procedure Breathing Pattern: Regular          Post procedure Breathing Pattern: Regular    Heart Rate: Pre procedure Pulse: 88           Post procedure Pulse: 84    Resp Rate: Pre procedure Respirations: 20           Post procedure Respirations: 20      Cough: Pre procedure Cough: Congested, Non-productive               Post procedure Cough: Congested, Non-productive      Oxygen: O2 Device: Nasal cannula   Flow rate (L/min) 6 lpm     Changed: NO    SpO2: Pre procedure SpO2: 96 %   with oxygen              Post procedure SpO2: 96 %  with oxygen    Nebulizer Therapy: Current medications Aerosolized Medications: Albuterol      Changed: NO    Smoking History: current smoker    Problem List:   Patient Active Problem List   Diagnosis Code    CHF (congestive heart failure) ([facility]) I50.9    Malignant essential hypertension I10    Asthma J45.909    Obesity hypoventilation syndrome ([facility]) E66.2    Dyspnea R06.00    Chest pressure R07.89    Acute on chronic diastolic heart failure ([facility]) I50.33    SVT (supraventricular tachycardia) ([facility]) I47.1    NSTEMI (non-ST elevated myocardial infarction) ([facility]) I21.4    S/P PTCA (percutaneous transluminal coronary angioplasty) Z98.61    Coronary atherosclerosis of native coronary artery I25.10    Hypoxia R09.02    Noncompliance with therapeutic plan Z91.11    Chest pain R07.9    GERD (gastroesophageal reflux disease) K21.9    Acute respiratory failure with hypoxia and hypercarbia ([facility]) J96.01, J96.02    COPD (chronic obstructive pulmonary disease) ([facility]) J44.9    Tobacco abuse Z72.0    Obesity, morbid (more than 100 lbs over ideal weight or BMI > 40) ([facility]) E66.01    Cellulitis L03.90    SIRS due to infectious process with acute organ dysfunction ([facility]) A41.9, R65.20    Sepsis associated hypotension ([facility]) A41.9    Gangrenous toe ([facility]) I96    Type II diabetes mellitus, uncontrolled ([facility]) E11.65    HTN (hypertension) W45    Diastolic CHF, chronic ([facility]) I50.32    Cough with hemoptysis R04.2    Lymphedema of both lower extremities I89.0    CAP (community acquired pneumonia) J18.9    Cellulitis, leg L03.119    COPD exacerbation ([facility]) J44.1    Maggot infestation B87.9    SOB (shortness of breath) R06.02    Shortness of breath R06.02    Multifocal pneumonia J18.9    Acute respiratory failure ([facility]) J96.00    Acute on chronic respiratory failure with hypoxia and hypercapnia ([facility]) J96.21, J96.22       Respiratory Therapist: Natalie Acuna RT

## 2018-02-03 VITALS
SYSTOLIC BLOOD PRESSURE: 98 MMHG | TEMPERATURE: 97.9 F | BODY MASS INDEX: 47.09 KG/M2 | RESPIRATION RATE: 18 BRPM | WEIGHT: 293 LBS | HEART RATE: 99 BPM | OXYGEN SATURATION: 89 % | HEIGHT: 66 IN | DIASTOLIC BLOOD PRESSURE: 70 MMHG

## 2018-02-03 LAB
GLUCOSE BLD STRIP.AUTO-MCNC: 114 MG/DL (ref 65–100)
SERVICE CMNT-IMP: ABNORMAL

## 2018-02-03 PROCEDURE — 77010033678 HC OXYGEN DAILY

## 2018-02-03 PROCEDURE — 74011000250 HC RX REV CODE- 250: Performed by: INTERNAL MEDICINE

## 2018-02-03 PROCEDURE — 74011250637 HC RX REV CODE- 250/637: Performed by: INTERNAL MEDICINE

## 2018-02-03 PROCEDURE — 94640 AIRWAY INHALATION TREATMENT: CPT

## 2018-02-03 PROCEDURE — 82962 GLUCOSE BLOOD TEST: CPT

## 2018-02-03 RX ORDER — FUROSEMIDE 40 MG/1
40 TABLET ORAL DAILY
Qty: 30 TAB | Refills: 0 | Status: SHIPPED | OUTPATIENT
Start: 2018-02-03 | End: 2018-03-05

## 2018-02-03 RX ORDER — ALBUTEROL SULFATE 0.83 MG/ML
2.5 SOLUTION RESPIRATORY (INHALATION)
Qty: 24 EACH | Refills: 0 | Status: ON HOLD | OUTPATIENT
Start: 2018-02-03 | End: 2018-10-22

## 2018-02-03 RX ORDER — METOPROLOL TARTRATE 25 MG/1
12.5 TABLET, FILM COATED ORAL 2 TIMES DAILY
Qty: 30 TAB | Refills: 0 | Status: SHIPPED | OUTPATIENT
Start: 2018-02-03 | End: 2018-03-05

## 2018-02-03 RX ADMIN — FLUTICASONE PROPIONATE 2 SPRAY: 50 SPRAY, METERED NASAL at 09:18

## 2018-02-03 RX ADMIN — PANTOPRAZOLE SODIUM 40 MG: 40 TABLET, DELAYED RELEASE ORAL at 09:18

## 2018-02-03 RX ADMIN — METOPROLOL TARTRATE 12.5 MG: 25 TABLET ORAL at 09:18

## 2018-02-03 RX ADMIN — ASPIRIN 81 MG: 81 TABLET, COATED ORAL at 09:17

## 2018-02-03 RX ADMIN — IPRATROPIUM BROMIDE AND ALBUTEROL SULFATE 3 ML: .5; 3 SOLUTION RESPIRATORY (INHALATION) at 10:16

## 2018-02-03 NOTE — PROGRESS NOTES
Case Management Discharge Note    Despite proactive planning, HAILEY ([id number]) arrived on scene 30 minutes late, with only one crew. Pt. Wt. Is 396 lbs. CM contacted dispatcher, explained situation and current ETA is within the hour. Updated bedside RN    QUINN contacted [facility] to advise of DC, [phone] they will accept patient back reluctantly. They have concerns about her ability to live alone and care for herself. CM reviewed latest medicals and spoke with patient about this. Patient appears alert and oriented and states she is able to care for self. CM advised agency that, as mandated reporters, they should report any specific concerns to APS. CM faxed DC summary and orders to [phone].

## 2018-02-03 NOTE — DISCHARGE SUMMARY
Hospitalist Discharge Summary     Patient ID:  Todd Butler  [id number]  75 y.o.  1970    PCP on record: Pasquale Hedrick MD    Admit date: 1/29/2018  Discharge date and time: 2/3/2018      DISCHARGE DIAGNOSIS:  Acute respiratory distress with acute on chronic hypoxic/hypercarbic respiratory failure, pulmonary HTN, obesity hypoventilation and GEN/noncompliance with CPAP: Baseline O2 6 LPM  Noninsulin dependent DM2 controlled without complication  Benign HTN  Lymphedema with chronic LLE wound  COPD without acute exacerbation  Supermorbid obesity (body mass index is 64.05 kg/(m^2)      CONSULTATIONS:  IP CONSULT TO INTENSIVIST  IP CONSULT TO PULMONOLOGY    Excerpted HPI from H&P of Jarrod Gonzalez MD:  Cee Antony is a 41 y.o.  female who presents with shortness of breath. Per patient, she is been feeling short of breath for past couple of weeks, worsen with exertion. She also reported mild productive cough with clear sputum. Pt denies any fever, chills, nausea, vomiting, diarrhea, problems urination. In ED pt noted to be hypoxic on her 5L O2 and ABgs showed respiratory acidosis.      We were asked to admit for work up and evaluation of the above problems. ______________________________________________________________________  DISCHARGE SUMMARY/HOSPITAL COURSE:  for full details see H&P, daily progress notes, labs, consult notes. Hospital course:  Acute respiratory distress with acute on chronic hypoxic/hypercarbic respiratory failure, pulmonary HTN, obesity hypoventilation and GEN/noncompliance with CPAP: Baseline O2 5 LPM;  Pt says that she still has not been using her trilogy machine after recent discharge. She says she did not like it because it smells like nicotine (she used to smoke), which I explained is not a fixable issue. Her machine was cleaned with bleach by RT while here.   Despite this, she continued to use Trilogy minimally, only for a couple hours at a time. She has very poor insight into her own contribution to her continued decline. Pt had CXR on admission with mild pulmonary edema versus interstitial pneumonia. She had echo 1/22 with EF 55-60%. Suboptimal endocardial visualization limits wall motion analysis.  +pulmonary HTN on CT chest last admission. She was diuresed with IV lasix, then changed back to po.  She was also treated with diamox while here. I have had multiple discussions with Pt and discussed with her daily that she must con't trilogy at night and when sleeping, but it is not clear to me that she will be compliant when she returns home. She should con't duonebs scheduled with prn albuterol. She was seen by pulmonology who had nothing further to add. Pt was also seen by PT/OT, not recommending rehab at this time so she will return home with HH[facility]. Noninsulin dependent DM2 controlled without complication: con't home glyburide and lantus  Benign HTN:  BP remains low, probably as she is now euvolemic. Pt was taken off lisinopril and her metoprolol dose was reduced. Lymphedema with chronic LLE wound:  lac-hydrin ordered while here  COPD without acute exacerbation: nebs as above. Supermorbid obesity (body mass index is 64.05 kg/(m^2)      _______________________________________________________________________  Patient seen and examined by me on discharge day.   Pertinent Findings:  Gen: obese, awake, appropriate, NAD  HEENT: cl get, no lesions  Chest: CTA bilaterally with stable poor air movement, no crackles or wheezes  Cv: RRR, no murmur, +lymphedema  Abd: soft, NT, moderately, BS+, no mass  Neuro: CN intact  _______________________________________________________________________  DISCHARGE MEDICATIONS:   Current Discharge Medication List      CONTINUE these medications which have CHANGED    Details   furosemide (LASIX) 40 mg tablet Take 1 Tab by mouth daily for 30 days.  Qty: 30 Tab, Refills: 0      metoprolol tartrate (LOPRESSOR) 25 mg tablet Take 0.5 Tabs by mouth two (2) times a day for 30 days. Qty: 30 Tab, Refills: 0      albuterol (PROVENTIL VENTOLIN) 2.5 mg /3 mL (0.083 %) nebulizer solution 3 mL by Nebulization route every four (4) hours as needed for Wheezing. Qty: 24 Each, Refills: 0         CONTINUE these medications which have NOT CHANGED    Details   aspirin 81 mg chewable tablet Take 81 mg by mouth daily. insulin glargine (LANTUS) 100 unit/mL injection 15 Units by SubCUTAneous route daily. traMADol (ULTRAM) 50 mg tablet Take 1 Tab by mouth every eight (8) hours as needed. Max Daily Amount: 150 mg.  Qty: 15 Tab, Refills: 0    Associated Diagnoses: Other chest pain      Nebulizer & Compressor machine 1 Each by Does Not Apply route daily as needed. Qty: 1 Each, Refills: 0      hydrOXYzine pamoate (VISTARIL) 50 mg capsule Take 50 mg by mouth every eight (8) hours as needed for Itching. Indications: Pruritus of Skin      nystatin (MYCOSTATIN) topical cream Apply  to affected area two (2) times daily as needed for Skin Irritation or Itching. fluticasone (FLONASE) 50 mcg/actuation nasal spray 2 Sprays by Both Nostrils route daily. Qty: 1 Bottle, Refills: 0      lovastatin (MEVACOR) 40 mg tablet Take 1 Tab by mouth nightly. Qty: 30 Tab, Refills: 6    Associated Diagnoses: Atherosclerosis of native coronary artery without angina pectoris      glyBURIDE (DIABETA) 5 mg tablet Take 5 mg by mouth Daily (before breakfast). cetirizine (ZYRTEC) 10 mg tablet Take 10 mg by mouth daily as needed for Allergies. ketoconazole (NIZORAL) 2 % topical cream Apply  to affected area daily. ammonium lactate (LAC-HYDRIN) 12 % topical cream rub in to affected area well  Qty: 280 g, Refills: 1      nitroglycerin (NITROSTAT) 0.4 mg SL tablet 1 Tab by SubLINGual route every five (5) minutes as needed for Chest Pain (call 911 if not relieved by 3).   Qty: 25 Tab, Refills: 2    Associated Diagnoses: SVT (supraventricular tachycardia) (HCC); CHF (congestive heart failure) (HCC)         STOP taking these medications       cyclobenzaprine (FLEXERIL) 10 mg tablet Comments:   Reason for Stopping:         lisinopril (PRINIVIL, ZESTRIL) 40 mg tablet Comments:   Reason for Stopping:         omeprazole (PRILOSEC) 40 mg capsule Comments:   Reason for Stopping:         aspirin delayed-release 81 mg tablet Comments:   Reason for Stopping:               My Recommended Diet, Activity, Wound Care, and follow-up labs are listed in the patient's Discharge Insturctions which I have personally completed and reviewed. ______________________________________________________________________    Risk of deterioration: High    Condition at Discharge:  Stable  ______________________________________________________________________    Disposition  Home with family and home health services  ______________________________________________________________________    Care Plan discussed with:   Patient, RN, Care Manager    ______________________________________________________________________    Code Status: Full Code  ______________________________________________________________________      Follow up with:   PCP : Vu Mendoza MD  Follow-up Information     Follow up With Details Comments Contact Info    Vu Mendoza MD  PCP - [facility] - follow up in 1-2 weeks. [de-identified] office will contact you to set up appointment. [address] [facility]  [address] [place] [place] MD  Sleep Doctor - needs appointment to assess and evaluate for trilogy needs as soon as possible [address] [address] [address] On 2/4/2018 [facility] will call on 2/3/18 to confirm appointment date and set time.    [address]  Is your oxygen provider [phone]    [facility]  Will be providing your home health [phone] inquired about admission from home to rehab. [facility] would evaluate and assess at time of need. Please call if you are interested in this.  [address] [place] [address]  This is your agency that provides personal care giver Keegan Prattton, [address] [place] [address]  [phone]              Total time in minutes spent coordinating this discharge (includes going over instructions, follow-up, prescriptions, and preparing report for sign off to her PCP) :  35 minutes    Signed:  Itz Connor MD

## 2018-02-03 NOTE — PROGRESS NOTES
Case Management    CM received call from RN post DC stating that patient had gotten home, but did not have a concentrator with capacity for 10 LPM.  CM contacted [facility] [phone]) about situation. Received a return call from [facility] stating that on call technician Farheen Sousa, was on his way to patient's home. CM updated RN on situation.       Vicky Stone, DARYL CSW-G  Ext [phone]

## 2018-02-03 NOTE — PROGRESS NOTES
PCU SHIFT NURSING NOTE      Bedside and Verbal shift change report given to Milvia Thornton RN (oncoming nurse) by Heydi Patterson RN (offgoing nurse). Report included the following information SBAR, Kardex, Intake/Output, MAR, Recent Results and Cardiac Rhythm NSR. Shift Summary:     0900 - Pt very argumentative this morning; refusing her lasix again; pt refuses any education regarding the importance of this medication. I have reviewed discharge instructions with the patient. The patient verbalized understanding.  6423 - Verified with Jeffery BALL that pt okay for discharge; CM to notify home health services of pt's discharge, pending transport at 10 AM. Nursing Technician in at bedside assisting pt to pack belongings and get dressed for transport. 1022 - [facility] transport arrived and stated that they did not have enough people to assist pt up her \"5 steps\" and that they would return later. Transport tech unable to give me an estimated time of arrival. Notified Jeffery BALL of transport issues; CM to followup. 1030 - CM stated that [facility] transport to arrive with backup within the next hour. 1140 - [facility] transport at bedside; gave discharge paperwork & scripts to transport to give to pt. 1250 - Pt called this nurse from home to state that she does not have a 10L concentrator and needs CM to call the oxygen company to have one sent today. Notified Jeffery BALL of pt's complaint; as per previous CM note, it was confirmed that pt had appropriate equipment for discharge. Jeffery BALL to follow.      Admission Date 1/29/2018   Admission Diagnosis Acute on chronic respiratory failure with hypoxia and hypercapnia ([facility] [address]   Consults IP CONSULT TO INTENSIVIST  IP CONSULT TO PULMONOLOGY        Consults   []PT   []OT   []Speech   []Case Management      [] Palliative      Cardiac Monitoring Order   [x]Yes   []No     IV drips   []Yes    Drip:                            Dose:  Drip:                            Dose:  Drip:                            Dose: [x]No     GI Prophylaxis   [x]Yes   []No         DVT Prophylaxis   SCDs:             Haroon stockings:         [] Medication   []Contraindicated   []None      Activity Level Activity Level: Up ad tiffani     Activity Assistance: No assistance needed   Purposeful Rounding every 1-2 hour? [x]Yes   Ogden Score  Total Score: 2   Bed Alarm (If score 3 or >)   []Yes   [] Refused (See signed refusal form in chart)   Jeffrey Score  Jeffrey Score: 17   Jeffrey Score (if score 14 or less)   []PMT consult   [x]Wound Care consult      []Specialty bed   [] Nutrition consult          Needs prior to discharge:   Home O2 required:    [x]Yes   []No    If yes, how much O2 required?  6L NC    Other:    Last Bowel Movement: Last Bowel Movement Date: 02/01/18      Influenza Vaccine Received Flu Vaccine for Current Season (usually Sept-March): No    Patient/Guardian Refused (Notify MD): No   Pneumonia Vaccine           Diet Active Orders   Diet    DIET DIABETIC CONSISTENT CARB Regular; 2 GM NA (House Low NA); FR 1800ML      LDAs               Peripheral IV 01/29/18 Right Antecubital (Active)   Site Assessment Clean, dry, & intact 2/2/2018  8:30 PM   Phlebitis Assessment 0 2/2/2018  8:30 PM   Infiltration Assessment 0 2/2/2018  8:30 PM   Dressing Status Clean, dry, & intact 2/2/2018  8:30 PM   Dressing Type Tape;Transparent 2/2/2018  8:30 PM   Hub Color/Line Status Pink 2/2/2018  8:30 PM   Alcohol Cap Used Yes 2/1/2018  4:18 PM                      Urinary Catheter      Intake & Output   Date 02/02/18 0700 - 02/03/18 0659 02/03/18 0700 - 02/04/18 0659   Shift 0531-9533 9997-1256 24 Hour Total 5239-8469 7116-4476 24 Hour Total   I  N  T  A  K  E   Shift Total  (mL/kg)         O  U  T  P  U  T   Urine  (mL/kg/hr) 450  (0.2) 1000  (0.5) 1450  (0.3)         Urine Voided 450 1000 1450       Shift Total  (mL/kg) 450  (2.5) 1000  (5.6) 1450  (8.1)      NET -450 -1000 -1450      Weight (kg) 180 180 180 180 180 180         Readmission Risk Assessment Tool Score High Risk            22       Total Score        3 Has Seen PCP in Last 6 Months (Yes=3, No=0)    4 IP Visits Last 12 Months (1-3=4, 4=9, >4=11)    9 Pt. Coverage (Medicare=5 , Medicaid, or Self-Pay=4)    6 Charlson Comorbidity Score (Age + Comorbid Conditions)        Criteria that do not apply:    . Living with Significant Other. Assisted Living. LTAC. SNF.  or   Rehab    Patient Length of Stay (>5 days = 3)       Expected Length of Stay 3d 16h   Actual Length of Stay 4

## 2018-02-03 NOTE — DISCHARGE INSTRUCTIONS
HOSPITALIST DISCHARGE INSTRUCTIONS    NAME: Alisha Polk   :  1970   MRN:  [id number]     Date/Time:  2/3/2018 8:50 AM    ADMIT DATE: 2018   DISCHARGE DATE: 2/3/2018     Attending Physician: Braden Giang MD    DISCHARGE DIAGNOSIS:  Acute on chronic hypoxic/hypercarbic respiratory failure, pulmonary HTN, obesity hypoventilation and GEN/noncompliance with trilogy - Baseline O2 6 LPM  Diabetes  Benign HTN  Lymphedema with chronic left leg wound  COPD without acute exacerbation  Supermorbid obesity (body mass index is 64.05 kg/m2)    MEDICATIONS:  See above    · It is important that you take the medication exactly as they are prescribed. · Keep your medication in the bottles provided by the pharmacist and keep a list of the medication names, dosages, and times to be taken in your wallet. · Do not take other medications without consulting your doctor. Pain Management: per above medications    What to do at [address]:  Resume previous diet    Recommended activity: Activity as tolerated    If you have questions regarding the hospital related prescriptions or hospital related issues please call [facility] at . You can always direct your questions to your primary care doctor if you are unable to reach your hospital physician; your PCP works as an extension of your hospital doctor just like your hospital doctor is an extension of your PCP for your time at [facility]. If you experience any of the following symptoms then please call your primary care physician or return to the emergency room if you cannot get hold of your doctor:  Fever, chills, nausea, vomiting, diarrhea, change in mentation, falling, bleeding, shortness of breath    Additional Instructions:      Bring these papers with you to your follow up appointments.  The papers will help your doctors be sure to continue the care plan from the hospital.              Information obtained by :  I understand that if any problems occur once I am at home I am to contact my physician. I understand and acknowledge receipt of the instructions indicated above. Physician's or R.N.'s Signature                                                                  Date/Time                                                                                                                                              Patient or Representative Signature                                                          Date/Time       Learning About CPAP/Trilogy for Sleep Apnea  What is CPAP/Trilogy? CPAP is a small machine that you use at home every night while you sleep. It increases air pressure in your throat to keep your airway open. When you have sleep apnea, this can help you sleep better so you feel much better. CPAP stands for \"continuous positive airway pressure. \"  The CPAP machine will have one of the following:  · A mask that covers your nose and mouth  · Prongs that fit into your nose  · A mask that covers your nose only, the most common type. This type is called NCPAP. The N stands for \"nasal.\"  Why is it done? CPAP is usually the best treatment for obstructive sleep apnea. It is the first treatment choice and the most widely used. Your doctor may suggest CPAP if you have:  · Moderate to severe sleep apnea. · Sleep apnea and coronary artery disease (CAD) or heart failure. How does it help? · CPAP can help you have more normal sleep, so you feel less sleepy and more alert during the daytime. · CPAP may help keep heart failure or other heart problems from getting worse. · CPAP may help lower your blood pressure. · If you use CPAP, your bed partner may also sleep better because you are not snoring or restless. What are the side effects?   Some people who use CPAP have:  · A dry or stuffy nose and a sore throat. · Irritated skin on the face. · Sore eyes. · Bloating. If you have any of these problems, work with your doctor to fix them. Here are some things you can try:  · Be sure the mask or nasal prongs fit well. · See if your doctor can adjust the pressure of your CPAP. · If your nose is dry, try a humidifier. · If your nose is runny or stuffy, try decongestant medicine or a steroid nasal spray. Be safe with medicines. Read and follow all instructions on the label. Do not use the medicine longer than the label says. If these things do not help, you might try a different type of machine. Some machines have air pressure that adjusts on its own. Others have air pressures that are different when you breathe in than when you breathe out. This may reduce discomfort caused by too much pressure in your nose. Where can you learn more? Go to http://cassia-donte.info/. Enter U472 in the search box to learn more about \"Learning About CPAP for Sleep Apnea. \"  Current as of: May 12, 2017  Content Version: 11.4  © 2407-0412 [employer] [facility] [employer]. Care instructions adapted under license by [employer] (which disclaims liability or warranty for this information). If you have questions about a medical condition or this instruction, always ask your healthcare professional. [address] any warranty or liability for your use of this information. Body Mass Index: Care Instructions  Your Care Instructions    Body mass index (BMI) can help you see if your weight is raising your risk for health problems. It uses a formula to compare how much you weigh with how tall you are. · A BMI lower than 18.5 is considered underweight. · A BMI between 18.5 and 24.9 is considered healthy. · A BMI between 25 and 29.9 is considered overweight. A BMI of 30 or higher is considered obese.   If your BMI is in the normal range, it means that you have a lower risk for weight-related health problems. If your BMI is in the overweight or obese range, you may be at increased risk for weight-related health problems, such as high blood pressure, heart disease, stroke, arthritis or joint pain, and diabetes. If your BMI is in the underweight range, you may be at increased risk for health problems such as fatigue, lower protection (immunity) against illness, muscle loss, bone loss, hair loss, and hormone problems. BMI is just one measure of your risk for weight-related health problems. You may be at higher risk for health problems if you are not active, you eat an unhealthy diet, or you drink too much alcohol or use tobacco products. Follow-up care is a key part of your treatment and safety. Be sure to make and go to all appointments, and call your doctor if you are having problems. It's also a good idea to know your test results and keep a list of the medicines you take. How can you care for yourself at home? · Practice healthy eating habits. This includes eating plenty of fruits, vegetables, whole grains, lean protein, and low-fat dairy. · If your doctor recommends it, get more exercise. Walking is a good choice. Bit by bit, increase the amount you walk every day. Try for at least 30 minutes on most days of the week. · Do not smoke. Smoking can increase your risk for health problems. If you need help quitting, talk to your doctor about stop-smoking programs and medicines. These can increase your chances of quitting for good. · Limit alcohol to 2 drinks a day for men and 1 drink a day for women. Too much alcohol can cause health problems. If you have a BMI higher than 25  · Your doctor may do other tests to check your risk for weight-related health problems. This may include measuring the distance around your waist. A waist measurement of more than 40 inches in men or 35 inches in women can increase the risk of weight-related health problems.   · Talk with your doctor about steps you can take to stay healthy or improve your health. You may need to make lifestyle changes to lose weight and stay healthy, such as changing your diet and getting regular exercise. If you have a BMI lower than 18.5  · Your doctor may do other tests to check your risk for health problems. · Talk with your doctor about steps you can take to stay healthy or improve your health. You may need to make lifestyle changes to gain or maintain weight and stay healthy, such as getting more healthy foods in your diet and doing exercises to build muscle. Where can you learn more? Go to http://cassia-donte.info/. Enter S176 in the search box to learn more about \"Body Mass Index: Care Instructions. \"  Current as of: October 13, 2016  Content Version: 11.4  © 2168-9845 Healthwise, [employer]. Care instructions adapted under license by [employer] (which disclaims liability or warranty for this information). If you have questions about a medical condition or this instruction, always ask your healthcare professional. [address] any warranty or liability for your use of this information.

## 2018-02-03 NOTE — PROGRESS NOTES
Received report from [facility]. TONO PADRON, MAR, RECENT RESULTS. No complaints of pain, nausea, or shortness of breath. Expressed concerns about no caregiver being present when she goes home. Further questioning stated a friend will be there when she gets home and then has to go to work. Stated caregiver will return on Sunday. 2330: Placed on personal trilogy from home.     0230: Removed Trilogy at patient request.

## 2018-02-05 LAB
BACTERIA SPEC CULT: NORMAL
BACTERIA SPEC CULT: NORMAL
SERVICE CMNT-IMP: NORMAL
SERVICE CMNT-IMP: NORMAL

## 2018-02-06 ENCOUNTER — PATIENT OUTREACH (OUTPATIENT)
Dept: FAMILY MEDICINE CLINIC | Age: 48
End: 2018-02-06

## 2018-02-06 NOTE — PROGRESS NOTES
Patient Name: Marine Pickett  YOB: 1970   Date/Time:  2/6/2018 4:23 PM    Outpatient NN notified of HSO non [address] patient discharged over the weekend. Courtesy call to patient. Patient was visited by Chris Delgadillo on Sunday. States she has been feeling fine except starting yesterday; when asked to clarify symptoms she just said she is a sick woman. When asked about her Trilogy machine, she stated that the discharge nurse from [facility] lost a piece of the machine and she has not been able to use. She states she call unit and ended up ordering the part that is missing. It has been delivered. She agreed to start using this evening. States she is currently on 6L of oxygen. Reviewed discharge paperwork which she states she had not received. Has not picked up her medications. States she had not received any scripts. Discussed that they were already called in. States she is not out of those medicines yet but will have her aide  tomorrow. Discussed discontinued meds on DC summary. Stated that she would not discuss using cyclobenzaprine. She made an appt with Dr Andrei Sykes (Krishna Thompson then received a call yesterday stating that they could not come out today. She was told they could not tell her when she would be seen and that they were putting her on the back of the list. Offered to call office for her. Called Dr JOSE Ibarra office. All operators were busy at the time; will attempt callback. Has not made an appt with Dr Sierra Kevin. Made goal to call in week. Has cancelled or no show with the sleep ctr in the past. She admitted it was not a priority at the present time. Home health is through [facility] which comes out three days a week for her wound care. Marko Jimenez provides her daily personal aide. Outpatient NN- Will follow up with PCP office for appt., goal to make appt with Dr Sierra Kevin and has she picked up her scripts at [facility].     Phil Ibarra office again.  States they probably will not be able to get to her before MD goes on vacation the day after tomorrow.  states that he will not be in her area tomorrow. She will discuss further with nurse  and call back. Mentioned she is high risk readmitted patient with 2 ED visits in the last 4 weeks. We would like her to be seen within a week of discharge. ..  Goals      Attends follow-up appointments as directed. 2/6/18- Agreed to contact Dr Catherine Martin office to schedule and keep an appt. .  Call PCP office for f/u post discharge from hospital.       Patient verbalizes understanding of self -management goals of living with Asthma.             2/6/18- Wear Trilogy machine

## 2018-02-08 ENCOUNTER — PATIENT OUTREACH (OUTPATIENT)
Dept: FAMILY MEDICINE CLINIC | Age: 48
End: 2018-02-08

## 2018-02-08 NOTE — PROGRESS NOTES
[address]  [phone]  Fax [phone]    Patient Name: Dayan Sigala  YOB: 1970    Follow up call to see if Dr Amina Anthony office contacted her about seeing her this week. States they were to be at the house any minute. It would not be Dr Everardo Andrews but a NP. Annoyed at them not coming out when they are suppose to and always making her an evening appt. States she does not know of any other visiting physicians program. Stated she had to go. NN Plan: Follow up next week for symptoms, goals and offer suggestions, if any, of MD home visiting programs. Within radius of At Home Program?  ..  Goals      Attends follow-up appointments as directed. 2/6/18- Agreed to contact Dr Brittany Lassiter office to schedule and keep an appt. .  Call PCP office for f/u post discharge from hospital.       Patient verbalizes understanding of self -management goals of living with Asthma.             2/6/18- Wear Trilogy machine

## 2018-02-19 ENCOUNTER — PATIENT OUTREACH (OUTPATIENT)
Dept: FAMILY MEDICINE CLINIC | Age: 48
End: 2018-02-19

## 2018-02-20 NOTE — PROGRESS NOTES
[address]  [phone]  Fax [phone]    Patient Name: Dayan Sigala  YOB: 1970    Follow up call to provide Home Based Primary Care Information. LM on VM. She was interested in a different home based program than she has now. ..  Goals      Attends follow-up appointments as directed. 2/6/18- Agreed to contact Dr Brittany Lassiter office to schedule and keep an appt. .  Call PCP office for f/u post discharge from hospital. Scheduled appt with sleep ctr for 3/15/18. Patient has scheduled before and has not attended appt. Will continue to monitor and enc to go to appt. . Providing [facility] Contact info. . Give her [facility] contact for Home based primary Care [phone]           Patient verbalizes understanding of self -management goals of living with Asthma.             2/6/18- Wear Trilogy machine

## 2018-03-02 ENCOUNTER — PATIENT OUTREACH (OUTPATIENT)
Dept: FAMILY MEDICINE CLINIC | Age: 48
End: 2018-03-02

## 2018-03-02 NOTE — PROGRESS NOTES
[place] follow up Call  Patient Name: Jeremie Jim  YOB: 1970    Patient hospitalized from 1/29-2/3/18 acute on chronic resp failure [facility]. She has seen her PCP and has not had any further issues at the present time. Resolving post 30 day Courtesy Call follow up. No further action by CM. ..  Goals      Attends follow-up appointments as directed. 2/6/18- Agreed to contact Dr Stefan Morfin office to schedule and keep an appt. .  Call PCP office for f/u post discharge from hospital. Scheduled appt with sleep ctr for 3/15/18. Patient has scheduled before and has not attended appt. Will continue to monitor and enc to go to appt. . Providing [facility] Contact info. . Give her [facility] contact for Home based primary Care [phone].   3/2/18-Information provided along with dispatch flyer brochure sent to patient's home since she stated she did not have a pen. [employer] and pad sent.  Patient verbalizes understanding of self -management goals of living with Asthma.             2/6/18- Wear Trilogy machine  3/2/18- States wearing nightly

## 2018-03-29 ENCOUNTER — TELEPHONE (OUTPATIENT)
Dept: FAMILY MEDICINE CLINIC | Age: 48
End: 2018-03-29

## 2018-03-29 ENCOUNTER — NURSE NAVIGATOR (OUTPATIENT)
Dept: PALLATIVE CARE | Age: 48
End: 2018-03-29

## 2018-03-29 DIAGNOSIS — J96.02 ACUTE RESPIRATORY FAILURE WITH HYPOXIA AND HYPERCARBIA (HCC): Primary | Chronic | ICD-10-CM

## 2018-03-29 DIAGNOSIS — I47.1 SVT (SUPRAVENTRICULAR TACHYCARDIA) (HCC): Chronic | ICD-10-CM

## 2018-03-29 DIAGNOSIS — J44.9 CHRONIC OBSTRUCTIVE PULMONARY DISEASE, UNSPECIFIED COPD TYPE (HCC): Chronic | ICD-10-CM

## 2018-03-29 DIAGNOSIS — E11.65 TYPE II OR UNSPECIFIED TYPE DIABETES MELLITUS WITH RENAL MANIFESTATIONS, UNCONTROLLED(250.42) (HCC): ICD-10-CM

## 2018-03-29 DIAGNOSIS — I96 GANGRENOUS TOE (HCC): ICD-10-CM

## 2018-03-29 DIAGNOSIS — E11.29 TYPE II OR UNSPECIFIED TYPE DIABETES MELLITUS WITH RENAL MANIFESTATIONS, UNCONTROLLED(250.42) (HCC): ICD-10-CM

## 2018-03-29 DIAGNOSIS — I89.0 LYMPHEDEMA OF BOTH LOWER EXTREMITIES: ICD-10-CM

## 2018-03-29 DIAGNOSIS — J96.01 ACUTE RESPIRATORY FAILURE WITH HYPOXIA AND HYPERCARBIA (HCC): Primary | Chronic | ICD-10-CM

## 2018-03-29 DIAGNOSIS — E66.01 OBESITY, MORBID (MORE THAN 100 LBS OVER IDEAL WEIGHT OR BMI > 40) (HCC): Chronic | ICD-10-CM

## 2018-03-29 NOTE — PROGRESS NOTES
Home [facility] Primary Care [facility] Support[facility]   (previously: At Home)  [phone] 4101 [facility], [address] [facility], [address]    Patient called [address] office this morning requesting to self-refer to [address] [place] [address]. Pt says she is currently followed by [facility], Dr. Nishant Edgar and she would like to transfer her care to [address] [place] [address]. She heard about [facility] through her [address]. The patient's case has been reviewed and _x__  Meets / ____ Does not Meet  the following criteria for a initial provider visit:    _x__  Patient's residence is within 20 miles of [address] [place] [address] address listed above  (13.1 miles)  _x___Patient is non-ambulatory (bedbound or wheel chair bound without ability to self-propel)  ___ Patient's residence is determined to be a safe environment for the health care team  __x_ Patient has chronic care management health care needs    This patient also ___ Meets / _x__ Does not Meet a priority referral for:    ___ Home Health integration  ___ Post discharge follow up  ___ High risk health care needs    This patient's referring provider has been notified of above.     PATIENT DEMOGRAPHICS     [address] [facility], [place], [address] [place] [address]  [phone]  1970     PRIMARY CARE PROVIDER:  Name: Stepan Gregorio MD  ([facility])  Phone Number:  [phone]  Address:  [address] [place] [address] [address]     REFERRAL SOURCE CONTACT INFORMATION:   [facility] nurse     DIAGNOSIS:  Acute on chronic diastolic heart failure, Malignant essential hypertension, NSTEMI 2012, Uncontrolled type 2 diabetes mellitus with complication, with long-term current use of insulin, Acute and chronic respiratory failure with hypoxia (home oxygen use), COPD, morbid obesity, limited mobility, GERD, hx of tobacco abuse     PRIMARY CARETAKER:  Pt lives alone  Name: Sergio Geronimo  Phone Number:  [phone] (cell)  Address: [address] [place] [address] [place] [address]  Relationship to the Patient:  Son    Pt states her nephew Mame Stands also checks on her. ACP:    Advance Care Planning 3/29/2018   Patient's Healthcare Decision Maker is: Named in scanned ACP document   Primary Decision Maker Name Sonia Geronimo   Primary Decision Maker Phone Number [phone]   Primary Decision Maker Relationship to Patient Adult child   Secondary Decision Maker Name June [address]   Secondary Decision Maker Phone Number [phone]   Confirm Advance Directive Yes, on file   Patient Would Like to Complete Advance Directive -     HOME ENVIRONMENT:    Stairs to enter home, no ramp    ADL's:  Medication Management: indpendent  Transportation:    stretcher  Hygiene:    Needs assist with bathing, dressing  Mobility:  Limited mobility with walker   Falls within past 6 months:  no    DME:  Hospital bed, walker, wheelchair, scooter (she says she has never used the scooter), home oxygen ([employer])    HOME SERVICES:   [facility], personal care aide Mon-Fri    NUTRITION:  diabetic    SKIN:  Left leg wound, right toe wound ([facility] does wound care)    TOILETING:  Bedside comode    DATE OF MOST RECENT CONTACT WITH A PROVIDER:  3/7/18  Visit by Dr. Hawa Major PCP OFFICE VISIT:  none    DATE OF MOST RECENT SPECIALIST VISIT/ UPCOMING APPTS:  none    WHAT BARRIERS DO YOU HAVE TO GETTING TO MD OFFICE:  Limited mobility, no ramp    [facility] admissions in the past year:  1/29/18, 1/20/18, 9/22/17, 7/24/17    8 ED visits in the past year:  1/27/18, 1/26/18, 1/15/18, 11/26/17, 9/8/17, 6/25/17, 6/16/17, 6/11/17    NOTES:  Nurse mailed \"Authorization to Release Health Information\" form to patient to request notes from [facility]. Pt has Medicare as primary insurance, no prior authorization needed.     MYA Long, RN-BC  Home Based Primary Care Referral Navigator  Phone:  [phone]    Fax:  [phone]  [email or web address]

## 2018-04-12 ENCOUNTER — TELEPHONE (OUTPATIENT)
Dept: FAMILY MEDICINE CLINIC | Age: 48
End: 2018-04-12

## 2018-04-12 NOTE — TELEPHONE ENCOUNTER
Spoke with pt offered appt for 4/16/18 at 1pm, pt declined stated she will call back to let me know a better day.

## 2018-04-13 NOTE — TELEPHONE ENCOUNTER
Outgoing call placed to patient - she stated that she is leaving to go out of town today and will not be back until next Thursday or Friday. Per patient she will call when she is back in town to schedule home visit.

## 2018-04-20 ENCOUNTER — TELEPHONE (OUTPATIENT)
Dept: FAMILY MEDICINE CLINIC | Age: 48
End: 2018-04-20

## 2018-04-20 NOTE — TELEPHONE ENCOUNTER
Outgoing call placed to patient to schedule appointment to establish care with Home Based Primary Care. Offered appointment for Wednesday 4/25/8 and she declined stating that she has a death in her family, the arrangements are not schedule as yet, and she will call us back when she can be seen.

## 2018-04-25 ENCOUNTER — APPOINTMENT (OUTPATIENT)
Dept: GENERAL RADIOLOGY | Age: 48
End: 2018-04-25
Payer: MEDICARE

## 2018-04-25 ENCOUNTER — HOSPITAL ENCOUNTER (EMERGENCY)
Age: 48
Discharge: HOME | End: 2018-04-26
Attending: EMERGENCY MEDICINE
Payer: MEDICARE

## 2018-04-25 VITALS
WEIGHT: 293 LBS | RESPIRATION RATE: 18 BRPM | DIASTOLIC BLOOD PRESSURE: 81 MMHG | TEMPERATURE: 98.1 F | OXYGEN SATURATION: 96 % | BODY MASS INDEX: 63.92 KG/M2 | HEART RATE: 80 BPM | SYSTOLIC BLOOD PRESSURE: 144 MMHG

## 2018-04-25 DIAGNOSIS — J98.9 CHRONIC RESPIRATORY DISEASE: Primary | ICD-10-CM

## 2018-04-25 DIAGNOSIS — R06.02 SOB (SHORTNESS OF BREATH): ICD-10-CM

## 2018-04-25 DIAGNOSIS — R07.89 ATYPICAL CHEST PAIN: ICD-10-CM

## 2018-04-25 DIAGNOSIS — R60.9 DEPENDENT EDEMA: ICD-10-CM

## 2018-04-25 DIAGNOSIS — J81.1 CHRONIC PULMONARY EDEMA: ICD-10-CM

## 2018-04-25 LAB
ALBUMIN SERPL-MCNC: 3.2 G/DL (ref 3.5–5)
ALBUMIN/GLOB SERPL: 0.6 {RATIO} (ref 1.1–2.2)
ALP SERPL-CCNC: 92 U/L (ref 45–117)
ALT SERPL-CCNC: 18 U/L (ref 12–78)
ANION GAP SERPL CALC-SCNC: 6 MMOL/L (ref 5–15)
AST SERPL-CCNC: 11 U/L (ref 15–37)
BASOPHILS # BLD: 0 K/UL (ref 0–0.1)
BASOPHILS NFR BLD: 0 % (ref 0–1)
BILIRUB SERPL-MCNC: 0.3 MG/DL (ref 0.2–1)
BNP SERPL-MCNC: 272 PG/ML (ref 0–125)
BUN SERPL-MCNC: 14 MG/DL (ref 6–20)
BUN/CREAT SERPL: 19 (ref 12–20)
CALCIUM SERPL-MCNC: 10.7 MG/DL (ref 8.5–10.1)
CHLORIDE SERPL-SCNC: 102 MMOL/L (ref 97–108)
CK SERPL-CCNC: 30 U/L (ref 26–192)
CO2 SERPL-SCNC: 30 MMOL/L (ref 21–32)
CREAT SERPL-MCNC: 0.72 MG/DL (ref 0.55–1.02)
DIFFERENTIAL METHOD BLD: NORMAL
EOSINOPHIL # BLD: 0.2 K/UL (ref 0–0.4)
EOSINOPHIL NFR BLD: 3 % (ref 0–7)
ERYTHROCYTE [DISTWIDTH] IN BLOOD BY AUTOMATED COUNT: 13.1 % (ref 11.5–14.5)
GLOBULIN SER CALC-MCNC: 5.3 G/DL (ref 2–4)
GLUCOSE SERPL-MCNC: 152 MG/DL (ref 65–100)
HCT VFR BLD AUTO: 43.2 % (ref 35–47)
HGB BLD-MCNC: 13.1 G/DL (ref 11.5–16)
IMM GRANULOCYTES # BLD: 0 K/UL (ref 0–0.04)
IMM GRANULOCYTES NFR BLD AUTO: 0 % (ref 0–0.5)
LIPASE SERPL-CCNC: 248 U/L (ref 73–393)
LYMPHOCYTES # BLD: 1.5 K/UL (ref 0.8–3.5)
LYMPHOCYTES NFR BLD: 16 % (ref 12–49)
MCH RBC QN AUTO: 28 PG (ref 26–34)
MCHC RBC AUTO-ENTMCNC: 30.3 G/DL (ref 30–36.5)
MCV RBC AUTO: 92.3 FL (ref 80–99)
MONOCYTES # BLD: 0.8 K/UL (ref 0–1)
MONOCYTES NFR BLD: 9 % (ref 5–13)
NEUTS SEG # BLD: 6.6 K/UL (ref 1.8–8)
NEUTS SEG NFR BLD: 72 % (ref 32–75)
NRBC # BLD: 0 K/UL (ref 0–0.01)
NRBC BLD-RTO: 0 PER 100 WBC
PLATELET # BLD AUTO: 179 K/UL (ref 150–400)
PMV BLD AUTO: 10.9 FL (ref 8.9–12.9)
POTASSIUM SERPL-SCNC: 3.8 MMOL/L (ref 3.5–5.1)
PROT SERPL-MCNC: 8.5 G/DL (ref 6.4–8.2)
RBC # BLD AUTO: 4.68 M/UL (ref 3.8–5.2)
SODIUM SERPL-SCNC: 138 MMOL/L (ref 136–145)
TROPONIN I SERPL-MCNC: <0.04 NG/ML
WBC # BLD AUTO: 9.1 K/UL (ref 3.6–11)

## 2018-04-25 PROCEDURE — 85025 COMPLETE CBC W/AUTO DIFF WBC: CPT | Performed by: PHYSICIAN ASSISTANT

## 2018-04-25 PROCEDURE — 36415 COLL VENOUS BLD VENIPUNCTURE: CPT | Performed by: EMERGENCY MEDICINE

## 2018-04-25 PROCEDURE — 83690 ASSAY OF LIPASE: CPT | Performed by: EMERGENCY MEDICINE

## 2018-04-25 PROCEDURE — 80053 COMPREHEN METABOLIC PANEL: CPT | Performed by: PHYSICIAN ASSISTANT

## 2018-04-25 PROCEDURE — 71045 X-RAY EXAM CHEST 1 VIEW: CPT

## 2018-04-25 PROCEDURE — 93005 ELECTROCARDIOGRAM TRACING: CPT

## 2018-04-25 PROCEDURE — 82550 ASSAY OF CK (CPK): CPT | Performed by: PHYSICIAN ASSISTANT

## 2018-04-25 PROCEDURE — 83880 ASSAY OF NATRIURETIC PEPTIDE: CPT | Performed by: PHYSICIAN ASSISTANT

## 2018-04-25 PROCEDURE — 99285 EMERGENCY DEPT VISIT HI MDM: CPT

## 2018-04-25 PROCEDURE — 84484 ASSAY OF TROPONIN QUANT: CPT | Performed by: PHYSICIAN ASSISTANT

## 2018-04-25 PROCEDURE — 74011000250 HC RX REV CODE- 250: Performed by: EMERGENCY MEDICINE

## 2018-04-25 PROCEDURE — 77030029684 HC NEB SM VOL KT MONA -A

## 2018-04-25 PROCEDURE — 94640 AIRWAY INHALATION TREATMENT: CPT

## 2018-04-25 RX ORDER — FUROSEMIDE 40 MG/1
40 TABLET ORAL 2 TIMES DAILY
Status: ON HOLD | COMMUNITY
End: 2019-01-06 | Stop reason: SDUPTHER

## 2018-04-25 RX ORDER — IPRATROPIUM BROMIDE AND ALBUTEROL SULFATE 2.5; .5 MG/3ML; MG/3ML
3 SOLUTION RESPIRATORY (INHALATION)
Status: COMPLETED | OUTPATIENT
Start: 2018-04-25 | End: 2018-04-25

## 2018-04-25 RX ORDER — METOPROLOL SUCCINATE 25 MG/1
TABLET, EXTENDED RELEASE ORAL 2 TIMES DAILY
COMMUNITY
End: 2019-01-03

## 2018-04-25 RX ADMIN — IPRATROPIUM BROMIDE AND ALBUTEROL SULFATE 3 ML: .5; 3 SOLUTION RESPIRATORY (INHALATION) at 22:10

## 2018-04-25 NOTE — ED PROVIDER NOTES
EMERGENCY DEPARTMENT HISTORY AND PHYSICAL EXAM    Date: 4/25/2018  Patient Name: Cozette Dance    History of Presenting Illness     Chief Complaint   Patient presents with    Shortness of Breath     Presents to the ED via EMS with c/o CP, SOB x several days. History of the same. Uses 3-5L O2 at baseline.  Chest Pain     PROVIDER IN TRIAGE NOTE:  5:39 PM  Ellen Epps PA-C has evaluated the patient as the Provider in Triage (PIT). Pt presents to the Emergency Dept with c/o chest pain and shortness of breath. Pt with h/o heart disease and CHF. Uses home oxygen. They have reviewed the vital signs and the triage nurse assessment. They have talked with the patient and any available family and advised that the appropriate studies have been ordered to initiate the work up based on the clinical presentation during the assessment. The pt has been advised that they will be accommodated in the Main ED as soon as possible. The pt has been requested to contact the triage nurse or PIT immediately if they experiences any changes in their condition during this brief waiting period. History Provided By: Patient and EMS    HPI: Cozette Dance is a 57 y.o. female, pmhx MI, COPD, HTN, SVT, CHF, DM, Asthma, who presents via EMS to the ED c/o persistent, left sided chest pain and exacerbated chronic shortness of breath x3 days. Pt states the pain radiates to her mid-back, between her shoulder blades and is exacerbated with breathing. Pt reports associated intermittent diaphoresis and an aching headache located over her bilateral temples. Pt reports using 6L oxygen at baseline. She denies taking any medications to modify her sxs. Pt specifically denies any fever, congestion, cough, abdominal pain, nausea, vomiting, diarrhea, dysuria, or urinary frequency.     Chief Complaint: chest pain  Duration: 3 Days  Timing:  Constant  Location: left sided  Quality: Pressure  Severity: Moderate  Modifying Factors: exacerbated with breathing  Associated Symptoms: SOB, diapohoresis, back pain and headache    PCP: Candance Lords, MD  Cardiology: Marshall Jasso MD     PMHx: Significant for MI, COPD, HTN, SVT, CHF, DM, Asthma  PSHx: Significant for  Section, Cardiac stent  Social Hx: -tobacco (former), -EtOH, -Illicit Drugs        There are no other complaints, changes, or physical findings at this time. Current Outpatient Prescriptions   Medication Sig Dispense Refill    metoprolol succinate (TOPROL-XL) 25 mg XL tablet Take  by mouth daily.  furosemide (LASIX) 40 mg tablet Take  by mouth daily.  aspirin 81 mg chewable tablet Take 81 mg by mouth daily.  insulin glargine (LANTUS) 100 unit/mL injection 15 Units by SubCUTAneous route daily.  fluticasone (FLONASE) 50 mcg/actuation nasal spray 2 Sprays by Both Nostrils route daily. 1 Bottle 0    lovastatin (MEVACOR) 40 mg tablet Take 1 Tab by mouth nightly. 30 Tab 6    glyBURIDE (DIABETA) 5 mg tablet Take 5 mg by mouth Daily (before breakfast).  ammonium lactate (LAC-HYDRIN) 12 % topical cream rub in to affected area well 280 g 1    albuterol (PROVENTIL VENTOLIN) 2.5 mg /3 mL (0.083 %) nebulizer solution 3 mL by Nebulization route every four (4) hours as needed for Wheezing. 24 Each 0    traMADol (ULTRAM) 50 mg tablet Take 1 Tab by mouth every eight (8) hours as needed. Max Daily Amount: 150 mg. 15 Tab 0    Nebulizer & Compressor machine 1 Each by Does Not Apply route daily as needed. 1 Each 0    hydrOXYzine pamoate (VISTARIL) 50 mg capsule Take 50 mg by mouth every eight (8) hours as needed for Itching. Indications: Pruritus of Skin      nystatin (MYCOSTATIN) topical cream Apply  to affected area two (2) times daily as needed for Skin Irritation or Itching.  cetirizine (ZYRTEC) 10 mg tablet Take 10 mg by mouth daily as needed for Allergies.  ketoconazole (NIZORAL) 2 % topical cream Apply  to affected area daily.       nitroglycerin (NITROSTAT) 0.4 mg SL tablet 1 Tab by SubLINGual route every five (5) minutes as needed for Chest Pain (call 911 if not relieved by 3). 25 Tab 2       Past History     Past Medical History:  Past Medical History:   Diagnosis Date    Arthritis     Asthma     CAD (coronary artery disease)     Congestive heart failure (HCC)     [facility]chronic obstructive pulmonary disease) [facility]     Diabetes ([facility]     Hypertension     Morbid obesity with BMI of 70 and over, adult ([facility]     NSTEMI (non-ST elevated myocardial infarction) ([facility]     SVT (supraventricular tachycardia) ([facility]        Past Surgical History:  Past Surgical History:   Procedure Laterality Date    CARDIAC SURG PROCEDURE UNLIST      Stents    HX  SECTION      HX ORTHOPAEDIC      HX OTHER SURGICAL      cyst removed from back       Family History:  Family History   Problem Relation Age of Onset    Heart Disease Mother     Heart Disease Father     Heart Disease Sister        Social History:  Social History   Substance Use Topics    Smoking status: Current Every Day Smoker     Packs/day: 0.25     Types: Cigarettes    Smokeless tobacco: Never Used      Comment: Patient states \"I  aint smoking no more d*mn cigarettes after yesterday\" 2018    Alcohol use No       Allergies:  No Known Allergies      Review of Systems   Review of Systems   Constitutional: Positive for diaphoresis. Negative for fever. Eyes: Negative. Respiratory: Positive for shortness of breath. Cardiovascular: Positive for chest pain. Gastrointestinal: Negative for abdominal pain, nausea and vomiting. Endocrine: Negative. Genitourinary: Negative. Negative for difficulty urinating, dysuria and hematuria. Musculoskeletal: Negative. Skin: Negative. Allergic/Immunologic: Negative. Neurological: Positive for headaches. Psychiatric/Behavioral: Negative for suicidal ideas. All other systems reviewed and are negative.       Physical Exam   Physical Exam Constitutional: She is oriented to person, place, and time. She appears well-developed and well-nourished. No distress. Super morbidly obese   HENT:   Head: Normocephalic and atraumatic. Nose: Nose normal.   Eyes: Conjunctivae and EOM are normal. No scleral icterus. Neck: Normal range of motion. No tracheal deviation present. Cardiovascular: Normal rate, regular rhythm, normal heart sounds and intact distal pulses. Exam reveals no friction rub. No murmur heard. Pulmonary/Chest: Effort normal and breath sounds normal. No stridor. No respiratory distress. She has no wheezes. She has no rales. Pt without appreciable wheezing on exam, pt notes the forcibly exhale and produce a wheezing sound and notes this is why she came to the ED. Abdominal: Soft. Bowel sounds are normal. She exhibits no distension. There is no tenderness. There is no rebound. Musculoskeletal: Normal range of motion. She exhibits deformity (pt with severe lymphedema to bilateral LE's and chronic cobblestoning/woody appearance of her skin. no active areas of erythema or drainage). She exhibits no tenderness. Neurological: She is alert and oriented to person, place, and time. No cranial nerve deficit. Skin: Skin is warm and dry. No rash noted. She is not diaphoretic. Psychiatric: She has a normal mood and affect. Her speech is normal and behavior is normal. Judgment and thought content normal. Cognition and memory are normal.   Nursing note and vitals reviewed.         Diagnostic Study Results     Labs -     Recent Results (from the past 12 hour(s))   EKG, 12 LEAD, INITIAL    Collection Time: 04/25/18  5:49 PM   Result Value Ref Range    Ventricular Rate 79 BPM    Atrial Rate 79 BPM    P-R Interval 192 ms    QRS Duration 82 ms    Q-T Interval 364 ms    QTC Calculation (Bezet) 417 ms    Calculated P Axis 51 degrees    Calculated R Axis 99 degrees    Calculated T Axis 51 degrees    Diagnosis       Normal sinus rhythm  Rightward axis  Borderline ECG  When compared with ECG of 30-JAN-2018 01:14,  premature ventricular complexes are no longer present     CBC WITH AUTOMATED DIFF    Collection Time: 04/25/18  8:45 PM   Result Value Ref Range    WBC 9.1 3.6 - 11.0 K/uL    RBC 4.68 3.80 - 5.20 M/uL    HGB 13.1 11.5 - 16.0 g/dL    HCT 43.2 35.0 - 47.0 %    MCV 92.3 80.0 - 99.0 FL    MCH 28.0 26.0 - 34.0 PG    MCHC 30.3 30.0 - 36.5 g/dL    RDW 13.1 11.5 - 14.5 %    PLATELET 944 304 - 486 K/uL    MPV 10.9 8.9 - 12.9 FL    NRBC 0.0 0  WBC    ABSOLUTE NRBC 0.00 0.00 - 0.01 K/uL    NEUTROPHILS 72 32 - 75 %    LYMPHOCYTES 16 12 - 49 %    MONOCYTES 9 5 - 13 %    EOSINOPHILS 3 0 - 7 %    BASOPHILS 0 0 - 1 %    IMMATURE GRANULOCYTES 0 0.0 - 0.5 %    ABS. NEUTROPHILS 6.6 1.8 - 8.0 K/UL    ABS. LYMPHOCYTES 1.5 0.8 - 3.5 K/UL    ABS. MONOCYTES 0.8 0.0 - 1.0 K/UL    ABS. EOSINOPHILS 0.2 0.0 - 0.4 K/UL    ABS. BASOPHILS 0.0 0.0 - 0.1 K/UL    ABS. IMM. GRANS. 0.0 0.00 - 0.04 K/UL    DF AUTOMATED     METABOLIC PANEL, COMPREHENSIVE    Collection Time: 04/25/18  8:45 PM   Result Value Ref Range    Sodium 138 136 - 145 mmol/L    Potassium 3.8 3.5 - 5.1 mmol/L    Chloride 102 97 - 108 mmol/L    CO2 30 21 - 32 mmol/L    Anion gap 6 5 - 15 mmol/L    Glucose 152 (H) 65 - 100 mg/dL    BUN 14 6 - 20 MG/DL    Creatinine 0.72 0.55 - 1.02 MG/DL    BUN/Creatinine ratio 19 12 - 20      GFR est AA >60 >60 ml/min/1.73m2    GFR est non-AA >60 >60 ml/min/1.73m2    Calcium 10.7 (H) 8.5 - 10.1 MG/DL    Bilirubin, total 0.3 0.2 - 1.0 MG/DL    ALT (SGPT) 18 12 - 78 U/L    AST (SGOT) 11 (L) 15 - 37 U/L    Alk.  phosphatase 92 45 - 117 U/L    Protein, total 8.5 (H) 6.4 - 8.2 g/dL    Albumin 3.2 (L) 3.5 - 5.0 g/dL    Globulin 5.3 (H) 2.0 - 4.0 g/dL    A-G Ratio 0.6 (L) 1.1 - 2.2     CK W/ REFLX CKMB    Collection Time: 04/25/18  8:45 PM   Result Value Ref Range    CK 30 26 - 192 U/L   TROPONIN I    Collection Time: 04/25/18  8:45 PM   Result Value Ref Range    Troponin-I, Qt. <0.04 <0.05 ng/mL   NT-PRO BNP    Collection Time: 04/25/18  8:45 PM   Result Value Ref Range    NT pro- (H) 0 - 125 PG/ML   LIPASE    Collection Time: 04/25/18 10:15 PM   Result Value Ref Range    Lipase 248 73 - 393 U/L       Radiologic Studies -   XR CHEST PORT   Final Result        CT Results  (Last 48 hours)    None        CXR Results  (Last 48 hours)               04/25/18 2127  XR CHEST PORT Final result    Impression:  IMPRESSION:       Mild diffuse interstitial opacities may represent pulmonary edema versus   infection. Narrative:  EXAM:  XR CHEST PORT       INDICATION:  Chest pain and shortness of breath. COMPARISON: 1/29/2018       TECHNIQUE: Portable AP upright chest view at 2124 hours       FINDINGS: The cardiomediastinal contours are stable. There is mild central   pulmonary vascular congestion. There are mild diffuse interstitial opacities. The bones and upper abdomen are   stable. Medical Decision Making   I am the first provider for this patient. I reviewed the vital signs, available nursing notes, past medical history, past surgical history, family history and social history. Vital Signs-Reviewed the patient's vital signs. Patient Vitals for the past 12 hrs:   Temp Pulse Resp BP SpO2   04/25/18 1739 98.1 °F (36.7 °C) 80 18 144/81 96 %       Pulse Oximetry Analysis - 97% on 6L NC    Cardiac Monitor:   Rate: 85 bpm  Rhythm: Normal Sinus Rhythm      Records Reviewed: Nursing Notes, Old Medical Records, Previous electrocardiograms, Ambulance Run Sheet, Previous Radiology Studies and Previous Laboratory Studies    Provider Notes (Medical Decision Making):     DDX:  chf exacerbation, copd, uri, volume overload    Plan:  Ekg, labs, cxr    Impression:  Acute on chronic sob, chf, lymphedema    ED Course:   Initial assessment performed.  The patients presenting problems have been discussed, and they are in agreement with the care plan formulated and outlined with them. I have encouraged them to ask questions as they arise throughout their visit. I reviewed our electronic medical record system for any past medical records that were available that may contribute to the patients current condition, the nursing notes and and vital signs from today's visit    Nursing notes will be reviewed as they become available in realtime while the pt has been in the ED. Royce Talley MD    I have spent 3-7 minutes discussing the medical risks of prolonged smoking habits and advised the patient of the benefits of the cessation of smoking, providing specific suggestions on how to quit. Royce Talley MD    EKG interpretation [phone]: NSR, R Axis, rate 79; , QRS 82, QTc 417; no acute ischemia; Royce Talley MD    I personally reviewed pt's imaging. Official read by radiology listed above. Royce Talley MD    11:39 PM  Progress note:  Pt noted to be feeling better after Duo-Neb, ready for discharge. Discussed lab and imaging findings with pt and/or family, specifically noting otherwise negative workup, negative CXR. Pt will follow up as instructed. All questions have been answered, pt voiced understanding and agreement with plan. If narcotics were prescribed, pt was advised not to drive or operate heavy machinery. If abx were prescribed, pt advised that diarrhea and rash are possible side effects of the medications. Specific return precautions provided in addition to instructions for pt to return to the ED immediately should sx worsen at any time. Royce Talley MD      Critical Care Time:     None    PLAN:  1. Current Discharge Medication List        2.    Follow-up Information     Follow up With Details Comments [facility] [place] [address] [place], [place] Schedule an appointment as soon as possible for a visit in 2 days  [address] [place] [address] Drive  [phone]          Return to ED if worse     Disposition:    11:40 PM   The patient's results have been reviewed with family and/or caregiver. They verbally convey their understanding and agreement of the patient's signs, symptoms, diagnosis, treatment and prognosis and additionally agree to follow up as recommended in the discharge instructions or to return to the Emergency Room should the patient's condition change prior to their follow-up appointment. The family and/or caregiver verbally agrees with the care-plan and all of their questions have been answered. The discharge instructions have also been provided to the them with educational information regarding the patient's diagnosis as well a list of reasons why the patient would want to return to the ER prior to their follow-up appointment should their condition change. Zuleyma Mccord MD      Diagnosis     Clinical Impression:   1. Chronic respiratory disease    2. Dependent edema    3. SOB (shortness of breath)    4. Atypical chest pain    5. Chronic pulmonary edema        Attestations: This note is prepared by Starr Dias, acting as Scribe for MD Zuleyma Manuel MD : The scribe's documentation has been prepared under my direction and personally reviewed by me in its entirety. I confirm that the note above accurately reflects all work, treatment, procedures, and medical decision making performed by me. This note will not be viewable in [address]

## 2018-04-26 ENCOUNTER — TELEPHONE (OUTPATIENT)
Dept: PALLATIVE CARE | Age: 48
End: 2018-04-26

## 2018-04-26 ENCOUNTER — NURSE NAVIGATOR (OUTPATIENT)
Dept: PALLATIVE CARE | Age: 48
End: 2018-04-26

## 2018-04-26 LAB
ATRIAL RATE: 79 BPM
CALCULATED P AXIS, ECG09: 51 DEGREES
CALCULATED R AXIS, ECG10: 99 DEGREES
CALCULATED T AXIS, ECG11: 51 DEGREES
DIAGNOSIS, 93000: NORMAL
P-R INTERVAL, ECG05: 192 MS
Q-T INTERVAL, ECG07: 364 MS
QRS DURATION, ECG06: 82 MS
QTC CALCULATION (BEZET), ECG08: 417 MS
VENTRICULAR RATE, ECG03: 79 BPM

## 2018-04-26 PROCEDURE — 74011250636 HC RX REV CODE- 250/636

## 2018-04-26 PROCEDURE — 96374 THER/PROPH/DIAG INJ IV PUSH: CPT

## 2018-04-26 RX ORDER — FUROSEMIDE 10 MG/ML
INJECTION INTRAMUSCULAR; INTRAVENOUS
Status: COMPLETED
Start: 2018-04-26 | End: 2018-04-26

## 2018-04-26 RX ORDER — FUROSEMIDE 10 MG/ML
40 INJECTION INTRAMUSCULAR; INTRAVENOUS
Status: COMPLETED | OUTPATIENT
Start: 2018-04-26 | End: 2018-04-26

## 2018-04-26 RX ADMIN — FUROSEMIDE 40 MG: 10 INJECTION INTRAMUSCULAR; INTRAVENOUS at 00:22

## 2018-04-26 RX ADMIN — FUROSEMIDE 40 MG: 10 INJECTION, SOLUTION INTRAMUSCULAR; INTRAVENOUS at 00:22

## 2018-04-26 NOTE — PROGRESS NOTES
[facility]   (previously: At Home)  [phone] [facility], [address] Monie Lopez, RN with [facility] returned call. Martinez Lopez says that she isn't sure why Ms. Marianne Maciel told [facility] nurse that she was going out of town 4/12/18 to 4/19/18 because Martinez Lopez [facility]. Martinez Lopez reports that pt told her she plans to stay with [facility] and ask for another physician with [facility]. Nurse called pt to confirm that she has changed her mind and decided to stay with [facility]. Pt says she is going to stay with [facility] for now. Pt says she has the phone # for [facility] [phone] and she will call if she needs our services.     Dano Kebede, NORMA  Referral Navigator, [facility]

## 2018-04-26 NOTE — DISCHARGE INSTRUCTIONS
Shortness of Breath: Care Instructions  Your Care Instructions  Shortness of breath has many causes. Sometimes conditions such as anxiety can lead to shortness of breath. Some people get mild shortness of breath when they exercise. Trouble breathing also can be a symptom of a serious problem, such as asthma, lung disease, emphysema, heart problems, and pneumonia. If your shortness of breath continues, you may need tests and treatment. Watch for any changes in your breathing and other symptoms. Follow-up care is a key part of your treatment and safety. Be sure to make and go to all appointments, and call your doctor if you are having problems. It's also a good idea to know your test results and keep a list of the medicines you take. How can you care for yourself at home? · Do not smoke or allow others to smoke around you. If you need help quitting, talk to your doctor about stop-smoking programs and medicines. These can increase your chances of quitting for good. · Get plenty of rest and sleep. · Take your medicines exactly as prescribed. Call your doctor if you think you are having a problem with your medicine. · Find healthy ways to deal with stress. ¨ Exercise daily. ¨ Get plenty of sleep. ¨ Eat regularly and well. When should you call for help? Call 911 anytime you think you may need emergency care. For example, call if:  ? · You have severe shortness of breath. ? · You have symptoms of a heart attack. These may include:  ¨ Chest pain or pressure, or a strange feeling in the chest.  ¨ Sweating. ¨ Shortness of breath. ¨ Nausea or vomiting. ¨ Pain, pressure, or a strange feeling in the back, neck, jaw, or upper belly or in one or both shoulders or arms. ¨ Lightheadedness or sudden weakness. ¨ A fast or irregular heartbeat. After you call 911, the  may tell you to chew 1 adult-strength or 2 to 4 low-dose aspirin. Wait for an ambulance. Do not try to drive yourself.    ?Call your doctor now or seek immediate medical care if:  ? · Your shortness of breath gets worse or you start to wheeze. Wheezing is a high-pitched sound when you breathe. ? · You wake up at night out of breath or have to prop your head up on several pillows to breathe. ? · You are short of breath after only light activity or while at rest.   ? Watch closely for changes in your health, and be sure to contact your doctor if:  ? · You do not get better over the next 1 to 2 days. Where can you learn more? Go to http://cassia-donte.info/. Enter S780 in the search box to learn more about \"Shortness of Breath: Care Instructions. \"  Current as of: May 12, 2017  Content Version: 11.4  © 6632-6131 [employer]. Care instructions adapted under license by [employer] (which disclaims liability or warranty for this information). If you have questions about a medical condition or this instruction, always ask your healthcare professional. [address] any warranty or liability for your use of this information. Chest Pain: Care Instructions  Your Care Instructions    There are many things that can cause chest pain. Some are not serious and will get better on their own in a few days. But some kinds of chest pain need more testing and treatment. Your doctor may have recommended a follow-up visit in the next 8 to 12 hours. If you are not getting better, you may need more tests or treatment. Even though your doctor has released you, you still need to watch for any problems. The doctor carefully checked you, but sometimes problems can develop later. If you have new symptoms or if your symptoms do not get better, get medical care right away. If you have worse or different chest pain or pressure that lasts more than 5 minutes or you passed out (lost consciousness), call 911 or seek other emergency help right away. A medical visit is only one step in your treatment. Even if you feel better, you still need to do what your doctor recommends, such as going to all suggested follow-up appointments and taking medicines exactly as directed. This will help you recover and help prevent future problems. How can you care for yourself at home? · Rest until you feel better. · Take your medicine exactly as prescribed. Call your doctor if you think you are having a problem with your medicine. · Do not drive after taking a prescription pain medicine. When should you call for help? Call 911 if:  ? · You passed out (lost consciousness). ? · You have severe difficulty breathing. ? · You have symptoms of a heart attack. These may include:  ¨ Chest pain or pressure, or a strange feeling in your chest.  ¨ Sweating. ¨ Shortness of breath. ¨ Nausea or vomiting. ¨ Pain, pressure, or a strange feeling in your back, neck, jaw, or upper belly or in one or both shoulders or arms. ¨ Lightheadedness or sudden weakness. ¨ A fast or irregular heartbeat. After you call 911, the  may tell you to chew 1 adult-strength or 2 to 4 low-dose aspirin. Wait for an ambulance. Do not try to drive yourself. ?Call your doctor today if:  ? · You have any trouble breathing. ? · Your chest pain gets worse. ? · You are dizzy or lightheaded, or you feel like you may faint. ? · You are not getting better as expected. ? · You are having new or different chest pain. Where can you learn more? Go to http://cassia-donte.info/. Enter A120 in the search box to learn more about \"Chest Pain: Care Instructions. \"  Current as of: March 20, 2017  Content Version: 11.4  © 9415-6008 [employer]. Care instructions adapted under license by [employer] (which disclaims liability or warranty for this information).  If you have questions about a medical condition or this instruction, always ask your healthcare professional. [employer] disclaims any warranty or liability for your use of this information. Leg and Ankle Edema: Care Instructions  Your Care Instructions  Swelling in the legs, ankles, and feet is called edema. It is common after you sit or stand for a while. Long plane flights or car rides often cause swelling in the legs and feet. You may also have swelling if you have to stand for long periods of time at your job. Problems with the veins in the legs (varicose veins) and changes in hormones can also cause swelling. Sometimes the swelling in the ankles and feet is caused by a more serious problem, such as heart failure, infection, blood clots, or liver or kidney disease. Follow-up care is a key part of your treatment and safety. Be sure to make and go to all appointments, and call your doctor if you are having problems. It's also a good idea to know your test results and keep a list of the medicines you take. How can you care for yourself at home? · If your doctor gave you medicine, take it as prescribed. Call your doctor if you think you are having a problem with your medicine. · Whenever you are resting, raise your legs up. Try to keep the swollen area higher than the level of your heart. · Take breaks from standing or sitting in one position. ¨ Walk around to increase the blood flow in your lower legs. ¨ Move your feet and ankles often while you stand, or tighten and relax your leg muscles. · Wear support stockings. Put them on in the morning, before swelling gets worse. · Eat a balanced diet. Lose weight if you need to. · Limit the amount of salt (sodium) in your diet. Salt holds fluid in the body and may increase swelling. When should you call for help? Call 911 anytime you think you may need emergency care. For example, call if:  ? · You have symptoms of a blood clot in your lung (called a pulmonary embolism). These may include:  ¨ Sudden chest pain. ¨ Trouble breathing. ¨ Coughing up blood.    ?Call your doctor now or seek immediate medical care if:  ? · You have signs of a blood clot, such as:  ¨ Pain in your calf, back of the knee, thigh, or groin. ¨ Redness and swelling in your leg or groin. ? · You have symptoms of infection, such as:  ¨ Increased pain, swelling, warmth, or redness. ¨ Red streaks or pus. ¨ A fever. ? Watch closely for changes in your health, and be sure to contact your doctor if:  ? · Your swelling is getting worse. ? · You have new or worsening pain in your legs. ? · You do not get better as expected. Where can you learn more? Go to http://cassia-donte.info/. Enter D468 in the search box to learn more about \"Leg and Ankle Edema: Care Instructions. \"  Current as of: March 20, 2017  Content Version: 11.4  © 0415-8001 [employer]. Care instructions adapted under license by [employer] (which disclaims liability or warranty for this information). If you have questions about a medical condition or this instruction, always ask your healthcare professional. [place] [address] any warranty or liability for your use of this information. Pulmonary Edema: Care Instructions  Your Care Instructions    Pulmonary edema is the buildup of fluid in the lungs. It usually occurs when the heart does not pump blood through the body properly. Pulmonary edema can also be caused by another disease, such as liver or kidney failure. It can also happen at high altitudes, from a poisoning, or as a result of a near-drowning. If you have fluid in your lungs, you may have trouble breathing, be restless, have a fast heart rate, or cough up foamy pink fluid. Breathing problems may be worse when you lie down. Follow-up care is a key part of your treatment and safety. Be sure to make and go to all appointments, and call your doctor if you are having problems. It's also a good idea to know your test results and keep a list of the medicines you take.   How can you care for yourself at home? Medicines  ? · Take your medicines exactly as prescribed. Call your doctor if you think you are having a problem with your medicine. ? · Review all of your regular medicines with your doctor. Do not take any vitamins, over-the-counter medicines, or herbal products without talking to your doctor first.   Diet  ? · Eat a balanced diet. Make an appointment with a dietitian if you have questions about what type of diet might be best for you. ? · Do not eat more than 2,000 milligrams (mg) of sodium each day. That is less than 1 teaspoon of salt a day, including all the salt you eat in prepared or packaged foods. ¨ Do not add salt while you are cooking or at the table. Flavor with garlic, lemon juice, onion, vinegar, herbs, and spices instead of salt. ¨ Eat fewer processed foods and foods from restaurants, including fast food. ¨ Use fresh or frozen foods instead of canned. ¨ Count and record how much sodium you eat each day. Check food labels for sodium. ¨ Ask your doctor before using salt substitutes that have potassium, such as Lite Salt. ? Lifestyle  ? · Stay out of air pollution; smog; cold, dry air; hot, humid air; and high altitudes. ? · Learn breathing methods that help the airflow in and out of your lungs. ? · Take rest breaks often. Schedule short rest breaks when doing housework and other activities. An occupational or physical therapist can help you find ways to do everyday activities with less effort. ? · Start light exercise if your doctor says it is okay. Try to stay as active as possible. If you have not exercised in the past, start out slowly. Walking is a good way to start. ? · Get enough rest at night. Sleeping with 1 or 2 pillows under your upper body and head may help you breathe easier at night. ? · Discuss rehabilitation with your doctor. Find out what programs are available in your area. ? · Do not smoke or use other tobacco products.  Smoking can make your condition worse. If you need help quitting, talk to your doctor about stop-smoking programs and medicines. These can increase your chances of quitting for good. ? · Do not use alcohol or illegal drugs. When should you call for help? Call 911 anytime you think you may need emergency care. For example, call if:  ? · You have severe trouble breathing. ? · You passed out (lost consciousness). ? · You have symptoms of a heart attack. These may include:  ¨ Chest pain or pressure, or a strange feeling in the chest.  ¨ Sweating. ¨ Shortness of breath. ¨ Nausea or vomiting. ¨ Pain, pressure, or a strange feeling in the back, neck, jaw, or upper belly or in one or both shoulders or arms. ¨ Lightheadedness or sudden weakness. ¨ A fast or irregular heartbeat. Pain that spreads from the chest to the neck, jaw, or one or both shoulders or arms. ? After you call 911, the  may tell you to chew 1 adult-strength or 2 to 4 low-dose aspirin. Wait for an ambulance. Do not try to drive yourself. ?Call your doctor now or seek immediate medical care if:  ? · You have trouble breathing or have wheezing that is getting worse. ? · You are coughing more deeply or more often. ? · You cough up blood. ? · You get a fever. ? · You have more swelling in your legs or belly. ? · Your symptoms are getting worse. ? Watch closely for changes in your health, and be sure to contact your doctor if you have any problems. Where can you learn more? Go to http://cassia-donte.info/. Enter U744 in the search box to learn more about \"Pulmonary Edema: Care Instructions. \"  Current as of: May 12, 2017  Content Version: 11.4  © 8628-3149 [employer]. Care instructions adapted under license by [employer] (which disclaims liability or warranty for this information).  If you have questions about a medical condition or this instruction, always ask your healthcare professional. [employer] disclaims any warranty or liability for your use of this information.

## 2018-04-26 NOTE — TELEPHONE ENCOUNTER
Melisa March with [address] is returning Lilibeth's call regarding patient.   Call transferred to [facility]

## 2018-04-26 NOTE — PROGRESS NOTES
[facility]   (previously: At Home)  [phone] [address]    Pt was referred to Home Based Primary Care by Patricia Flores RN with [facility]. Pt has been offered [facility] appts on 4/16/18 and 4/25/18. Pt declined 4/16/18 appt saying she was leaving to go out of town 4/12/18  and will not be back until 4/19/18 or 4/20/18.   Pt said she would call when she is back in town to schedule home visit. Dariel Alberto RN called on 4/20/18 and offered appointment for Wednesday 4/25/18. Pt declined stating that she has a death in her family, the arrangements are not schedule as yet, and she will call us back when she can be seen. Per chart, pt [facility] [id number] [facility] on 4/25/18 stating she had SOB and chest pain x 3 days. Pt felt better after Duo-Neb, CXR negative, NT pro-BNP was 272 (down from 378 on 1/29/18), pt discharged home. Nurse called [facility] [place] and left message for pt's nurse Patricia Flores to call back to update us on pt's condition and her homebound status.     Renetta Barlow RN  Referral Navigator, [facility]

## 2018-07-08 ENCOUNTER — HOSPITAL ENCOUNTER (INPATIENT)
Age: 48
LOS: 4 days | Discharge: HOME HEALTH | DRG: 189 | End: 2018-07-13
Attending: EMERGENCY MEDICINE | Admitting: INTERNAL MEDICINE
Payer: MEDICARE

## 2018-07-08 ENCOUNTER — APPOINTMENT (OUTPATIENT)
Dept: GENERAL RADIOLOGY | Age: 48
DRG: 189 | End: 2018-07-08
Attending: EMERGENCY MEDICINE
Payer: MEDICARE

## 2018-07-08 DIAGNOSIS — J96.01 ACUTE RESPIRATORY FAILURE WITH HYPOXIA AND HYPERCAPNIA (HCC): Primary | ICD-10-CM

## 2018-07-08 DIAGNOSIS — E66.2 OBESITY HYPOVENTILATION SYNDROME (HCC): ICD-10-CM

## 2018-07-08 DIAGNOSIS — E87.3 METABOLIC ALKALOSIS: ICD-10-CM

## 2018-07-08 DIAGNOSIS — Z91.14 HISTORY OF MEDICATION NONCOMPLIANCE: ICD-10-CM

## 2018-07-08 DIAGNOSIS — J96.02 ACUTE RESPIRATORY FAILURE WITH HYPOXIA AND HYPERCAPNIA (HCC): Primary | ICD-10-CM

## 2018-07-08 DIAGNOSIS — E66.01 MORBID OBESITY WITH BODY MASS INDEX (BMI) GREATER THAN OR EQUAL TO 70 IN ADULT (HCC): ICD-10-CM

## 2018-07-08 DIAGNOSIS — J44.1 ACUTE EXACERBATION OF CHRONIC OBSTRUCTIVE PULMONARY DISEASE (COPD) (HCC): ICD-10-CM

## 2018-07-08 PROCEDURE — 93005 ELECTROCARDIOGRAM TRACING: CPT

## 2018-07-08 PROCEDURE — 71045 X-RAY EXAM CHEST 1 VIEW: CPT

## 2018-07-08 PROCEDURE — 99285 EMERGENCY DEPT VISIT HI MDM: CPT

## 2018-07-08 RX ORDER — SODIUM CHLORIDE 0.9 % (FLUSH) 0.9 %
5-10 SYRINGE (ML) INJECTION AS NEEDED
Status: DISCONTINUED | OUTPATIENT
Start: 2018-07-08 | End: 2018-07-13 | Stop reason: HOSPADM

## 2018-07-08 NOTE — IP AVS SNAPSHOT
[address] 
[phone] Patient: Marcio Freitas MRN: [id number] About your hospitalization You were admitted on:  July 9, 2018 You last received care in the:  [facility] PROGRESSIVE CARE You were discharged on:  July 13, 2018 Why you were hospitalized Your primary diagnosis was:  Nsvt (Nonsustained Ventricular Tachycardia) (Hcc) Your diagnoses also included:  Acute Respiratory Failure (Hcc) Follow-up Information Follow up With Details Comments Contact Info [facility]  This is the provider of your wound care services. [address] [facility] [address] 
[phone] Bryan Tian NP  They will contact you to schedule a hospital follow-up appointment. [place] [address] [address] 
[phone] [facility]  This is the provider of your hospital bed. Please contact them to discuss mattress concerns. [address] [place] [address] 20 1200 [facility] [address] [phone] "[employer] [facility] [employer] On 7/15/2018 Expect a phone call from [place] to schedule a follow up visit with you in 24-48 hours. If you have questions please Contact #[phone] Visit at [address] [facility] and  [place] have teamed up to make care upon discharge more convenient. A team of doctors, nurse practitioners and EMTs, that are equipped with all the tools necessary to provide advanced medical care in the comfort of your home. Bryan Tian NP Schedule an appointment as soon as possible for a visit in 1 week  [place] [address] [address] 
[phone] Ramila Montez MD Schedule an appointment as soon as possible for a visit in 1 week  [address] [facility] [address] 
[phone] Patricia Holloway MD Schedule an appointment as soon as possible for a visit in 1 week  [address] [facility] [address] [facility] 
[phone] Discharge Orders None A check edwina indicates which time of day the medication should be taken. My Medications START taking these medications Instructions Each Dose to Equal  
 Morning Noon Evening Bedtime  
 predniSONE 20 mg tablet Commonly known as:  Juju Soria Start taking on:  7/14/2018 Your last dose was: Your next dose is: Take 1 Tab by mouth daily (with breakfast) for 5 days. 20 mg CONTINUE taking these medications Instructions Each Dose to Equal  
 Morning Noon Evening Bedtime * VENTOLIN HFA 90 mcg/actuation inhaler Generic drug:  albuterol Your last dose was: Your next dose is: Take 2 Puffs by inhalation every six (6) hours as needed for Wheezing. 2 Puff * albuterol 2.5 mg /3 mL (0.083 %) nebulizer solution Commonly known as:  PROVENTIL VENTOLIN Your last dose was: Your next dose is:    
   
   
 3 mL by Nebulization route every four (4) hours as needed for Wheezing. 2.5 mg  
    
   
   
   
  
 ammonium lactate 12 % topical cream  
Commonly known as:  LAC-HYDRIN Your last dose was: Your next dose is:    
   
   
 rub in to affected area well  
     
   
   
   
  
 aspirin 81 mg chewable tablet Your last dose was: Your next dose is: Take 81 mg by mouth daily. 81 mg  
    
   
   
   
  
 cetirizine 10 mg tablet Commonly known as:  ZYRTEC Your last dose was: Your next dose is: Take 10 mg by mouth daily as needed for Allergies. 10 mg  
    
   
   
   
  
 cyclobenzaprine 5 mg tablet Commonly known as:  FLEXERIL Your last dose was: Your next dose is: Take 5 mg by mouth two (2) times a day. flexeriol 5 mg fluticasone 50 mcg/actuation nasal spray Commonly known as:  Euna Royce Your last dose was: Your next dose is: 2 Sprays by Both Nostrils route daily. 2 Spray  
    
   
   
   
  
 glyBURIDE 5 mg tablet Commonly known as:  Austin Diamond Your last dose was: Your next dose is: Take 5 mg by mouth Daily (before breakfast). 5 mg  
    
   
   
   
  
 hydrOXYzine pamoate 50 mg capsule Commonly known as:  VISTARIL Your last dose was: Your next dose is: Take 50 mg by mouth every eight (8) hours as needed for Itching. Indications: Pruritus of Skin 50 mg  
    
   
   
   
  
 ketoconazole 2 % topical cream  
Commonly known as:  NIZORAL Your last dose was: Your next dose is:    
   
   
 Apply  to affected area daily. LANTUS U-100 INSULIN 100 unit/mL injection Generic drug:  insulin glargine Your last dose was: Your next dose is:    
   
   
 15 Units by SubCUTAneous route daily. 15 Units LASIX 40 mg tablet Generic drug:  furosemide Your last dose was: Your next dose is: Take 40 mg by mouth two (2) times a day. Indications: Peripheral Edema due to Chronic Heart Failure 40 mg  
    
   
   
   
  
 lovastatin 40 mg tablet Commonly known as:  MEVACOR Your last dose was: Your next dose is: Take 1 Tab by mouth nightly. 40 mg  
    
   
   
   
  
 metoprolol succinate 25 mg XL tablet Commonly known as:  TOPROL-XL Your last dose was: Your next dose is: Take  by mouth two (2) times a day. nitroglycerin 0.4 mg SL tablet Commonly known as:  NITROSTAT Your last dose was: Your next dose is:    
   
   
 1 Tab by SubLINGual route every five (5) minutes as needed for Chest Pain (call 911 if not relieved by 3).   
 0.4 mg  
    
   
   
   
  
 nystatin topical cream  
Commonly known as:  MYCOSTATIN Your last dose was: Your next dose is:    
   
   
 Apply  to affected area two (2) times daily as needed for Skin Irritation or Itching. * Notice: This list has 2 medication(s) that are the same as other medications prescribed for you. Read the directions carefully, and ask your doctor or other care provider to review them with you. Where to Get Your Medications Information on where to get these meds will be given to you by the nurse or doctor. ! Ask your nurse or doctor about these medications  
  predniSONE 20 mg tablet Discharge Instructions [address] [facility], [address] 
[phone] Cbc and Bmp in 1 week. Cont home wound care. Introducing \[facility] [place] [facility]\"" & HEALTH SERVICES! Romayne Duster introduces [employer] patient portal. Now you can access parts of your medical record, email your doctor's office, and request medication refills online. 1. In your internet browser, go to [email or web address] 2. Click on the First Time User? Click Here link in the Sign In box. You will see the New Member Sign Up page. 3. Enter your [employer] Access Code exactly as it appears below. You will not need to use this code after youve completed the sign-up process. If you do not sign up before the expiration date, you must request a new code. · [employer] Access Code: [email or web address] Expires: 7/24/2018  5:35 PM 
 
4. Enter the last four digits of your Social Security Number (xxxx) and Date of Birth (mm/dd/yyyy) as indicated and click Submit. You will be taken to the next sign-up page. 5. Create a [employer] ID. This will be your [employer] login ID and cannot be changed, so think of one that is secure and easy to remember. 6. Create a [employer] password. You can change your password at any time. 7. Enter your Password Reset Question and Answer. This can be used at a later time if you forget your password. 8. Enter your e-mail address. You will receive e-mail notification when new information is available in [address] 9. Click Sign Up. You can now view and download portions of your medical record. 10. Click the Download Summary menu link to download a portable copy of your medical information. If you have questions, please visit the Frequently Asked Questions section of the [employer] website. Remember, [employer] is NOT to be used for urgent needs. For medical emergencies, dial 911. Now available from your iPhone and Android! Introducing [employer]ter As a [employer] patient, I wanted to make you aware of our electronic visit tool called [employer]. [employer] 24/7 allows you to connect within minutes with a medical provider 24 hours a day, seven days a week via a mobile device or tablet or logging into a secure website from your computer. You can access Bandar Gunter from anywhere in the [place]. A virtual visit might be right for you when you have a simple condition and feel like you just dont want to get out of bed, or cant get away from work for an appointment, when your regular [employer] provider is not available (evenings, weekends or holidays), or when youre out of town and need minor care. Electronic visits cost only $49 and if the [employer] 24/7 provider determines a prescription is needed to treat your condition, one can be electronically transmitted to a nearby pharmacy*. Please take a moment to enroll today if you have not already done so. The enrollment process is free and takes just a few minutes. To enroll, please download the [employer] 24/7 mireya to your tablet or phone, or visit [email or web address] to enroll on your computer.    
And, as an [address] [place] [address] patient with a [employer] account, the results of your visits will be scanned into your electronic medical record and your primary care provider will be able to view the scanned results. We urge you to continue to see your regular [facility] provider for your ongoing medical care. And while your primary care provider may not be the one available when you seek a [employer] virtual visit, the peace of mind you get from getting a real diagnosis real time can be priceless. For more information on [employer], view our Frequently Asked Questions (FAQs) at [email or web address]. Sincerely, 
 
Kellie Miller MD 
Chief Medical Officer Naif Walker *:  certain medications cannot be prescribed via [employer] Providers Seen During Your Hospitalization Provider Specialty Primary office phone Nissa Muller MD Emergency Medicine [phone] Joaquin Stanley MD Internal Medicine [phone] Your Primary Care Physician (PCP) Primary Care Physician Office Phone Office Fax Jeremypaulyuliana SMALLS [phone] You are allergic to the following No active allergies Recent Documentation Weight Breastfeeding? BMI OB Status Smoking Status (!) 191.9 kg No 68.28 kg/m2 Injection Current Every Day Smoker Emergency Contacts Name Discharge Info Relation Home Work Mobile Edwar Ro N/A  AT THIS TIME [6] Child [2] [phone] Cecile Cervantes N/A  AT THIS TIME [6] Friend [5] [phone] Patient Belongings The following personal items are in your possession at time of discharge: 
  Dental Appliances: None  Visual Aid: Glasses      Home Medications: None   Jewelry: None  Clothing: Footwear, Pants, Shirt, Shorts, Socks    Other Valuables: Cell Phone (blue bag) Please provide this summary of care documentation to your next provider. Signatures-by signing, you are acknowledging that this After Visit Summary has been reviewed with you and you have received a copy. Patient Signature:  ____________________________________________________________ Date:  ____________________________________________________________  
  
Lori Hero Provider Signature:  ____________________________________________________________ Date:  ____________________________________________________________

## 2018-07-09 LAB
ALBUMIN SERPL-MCNC: 3.2 G/DL (ref 3.5–5)
ALBUMIN/GLOB SERPL: 0.6 {RATIO} (ref 1.1–2.2)
ALP SERPL-CCNC: 96 U/L (ref 45–117)
ALT SERPL-CCNC: 23 U/L (ref 12–78)
ANION GAP SERPL CALC-SCNC: 4 MMOL/L (ref 5–15)
APTT PPP: 22.7 SEC (ref 22.1–32)
ARTERIAL PATENCY WRIST A: ABNORMAL
ARTERIAL PATENCY WRIST A: ABNORMAL
ARTERIAL PATENCY WRIST A: YES
AST SERPL-CCNC: 20 U/L (ref 15–37)
ATRIAL RATE: 82 BPM
BASE EXCESS BLDA CALC-SCNC: 6.6 MMOL/L
BASE EXCESS BLDA CALC-SCNC: 9.2 MMOL/L
BASE EXCESS BLDA CALC-SCNC: 9.4 MMOL/L
BDY SITE: ABNORMAL
BILIRUB SERPL-MCNC: 0.3 MG/DL (ref 0.2–1)
BNP SERPL-MCNC: 233 PG/ML (ref 0–125)
BREATHS.SPONTANEOUS ON VENT: 24
BREATHS.SPONTANEOUS ON VENT: 29
BUN SERPL-MCNC: 15 MG/DL (ref 6–20)
BUN/CREAT SERPL: 18 (ref 12–20)
CALCIUM SERPL-MCNC: 10.7 MG/DL (ref 8.5–10.1)
CALCULATED P AXIS, ECG09: 37 DEGREES
CALCULATED R AXIS, ECG10: 50 DEGREES
CALCULATED T AXIS, ECG11: 39 DEGREES
CHLORIDE SERPL-SCNC: 100 MMOL/L (ref 97–108)
CK MB CFR SERPL CALC: NORMAL % (ref 0–2.5)
CK MB SERPL-MCNC: <1 NG/ML (ref 5–25)
CK SERPL-CCNC: 42 U/L (ref 26–192)
CO2 SERPL-SCNC: 34 MMOL/L (ref 21–32)
CREAT SERPL-MCNC: 0.82 MG/DL (ref 0.55–1.02)
DIAGNOSIS, 93000: NORMAL
EPAP/CPAP/PEEP, PAPEEP: 10
EPAP/CPAP/PEEP, PAPEEP: 10
FIO2 ON VENT: 40 %
FIO2 ON VENT: 50 %
GAS FLOW.O2 O2 DELIVERY SYS: 5 L/MIN
GAS FLOW.O2 SETTING OXYMISER: 4 L/MIN
GAS FLOW.O2 SETTING OXYMISER: 4 L/MIN
GLOBULIN SER CALC-MCNC: 5.5 G/DL (ref 2–4)
GLUCOSE BLD STRIP.AUTO-MCNC: 245 MG/DL (ref 65–100)
GLUCOSE BLD STRIP.AUTO-MCNC: 326 MG/DL (ref 65–100)
GLUCOSE BLD STRIP.AUTO-MCNC: 329 MG/DL (ref 65–100)
GLUCOSE SERPL-MCNC: 140 MG/DL (ref 65–100)
HCO3 BLDA-SCNC: 36 MMOL/L (ref 22–26)
HCO3 BLDA-SCNC: 37 MMOL/L (ref 22–26)
HCO3 BLDA-SCNC: 38 MMOL/L (ref 22–26)
INR PPP: 1 (ref 0.9–1.1)
IPAP/PIP, IPAPIP: 20
IPAP/PIP, IPAPIP: 20
P-R INTERVAL, ECG05: 184 MS
PCO2 BLDA: 60 MMHG (ref 35–45)
PCO2 BLDA: 69 MMHG (ref 35–45)
PCO2 BLDA: 75 MMHG (ref 35–45)
PH BLDA: 7.3 [PH] (ref 7.35–7.45)
PH BLDA: 7.35 [PH] (ref 7.35–7.45)
PH BLDA: 7.4 [PH] (ref 7.35–7.45)
PO2 BLDA: 171 MMHG (ref 80–100)
PO2 BLDA: 75 MMHG (ref 80–100)
PO2 BLDA: 76 MMHG (ref 80–100)
POTASSIUM SERPL-SCNC: 4.4 MMOL/L (ref 3.5–5.1)
PROT SERPL-MCNC: 8.7 G/DL (ref 6.4–8.2)
PROTHROMBIN TIME: 10.2 SEC (ref 9–11.1)
Q-T INTERVAL, ECG07: 366 MS
QRS DURATION, ECG06: 80 MS
QTC CALCULATION (BEZET), ECG08: 427 MS
SAO2 % BLD: 93 % (ref 92–97)
SAO2 % BLD: 95 % (ref 92–97)
SAO2 % BLD: 99 % (ref 92–97)
SAO2% DEVICE SAO2% SENSOR NAME: ABNORMAL
SERVICE CMNT-IMP: ABNORMAL
SODIUM SERPL-SCNC: 138 MMOL/L (ref 136–145)
SPECIMEN SITE: ABNORMAL
THERAPEUTIC RANGE,PTTT: NORMAL SECS (ref 58–77)
TROPONIN I SERPL-MCNC: <0.05 NG/ML
TSH SERPL DL<=0.05 MIU/L-ACNC: 2.87 UIU/ML (ref 0.36–3.74)
VENTILATION MODE VENT: ABNORMAL
VENTRICULAR RATE, ECG03: 82 BPM

## 2018-07-09 PROCEDURE — 94761 N-INVAS EAR/PLS OXIMETRY MLT: CPT

## 2018-07-09 PROCEDURE — 74011000250 HC RX REV CODE- 250

## 2018-07-09 PROCEDURE — 94640 AIRWAY INHALATION TREATMENT: CPT

## 2018-07-09 PROCEDURE — 74011250637 HC RX REV CODE- 250/637: Performed by: INTERNAL MEDICINE

## 2018-07-09 PROCEDURE — 74011636637 HC RX REV CODE- 636/637: Performed by: INTERNAL MEDICINE

## 2018-07-09 PROCEDURE — 85610 PROTHROMBIN TIME: CPT | Performed by: EMERGENCY MEDICINE

## 2018-07-09 PROCEDURE — 77030012879 HC MSK CPAP FLL FAC PHIL -B

## 2018-07-09 PROCEDURE — 74011000250 HC RX REV CODE- 250: Performed by: INTERNAL MEDICINE

## 2018-07-09 PROCEDURE — 74011250637 HC RX REV CODE- 250/637: Performed by: HOSPITALIST

## 2018-07-09 PROCEDURE — 5A09357 ASSISTANCE WITH RESPIRATORY VENTILATION, LESS THAN 24 CONSECUTIVE HOURS, CONTINUOUS POSITIVE AIRWAY PRESSURE: ICD-10-PCS | Performed by: INTERNAL MEDICINE

## 2018-07-09 PROCEDURE — 36600 WITHDRAWAL OF ARTERIAL BLOOD: CPT | Performed by: EMERGENCY MEDICINE

## 2018-07-09 PROCEDURE — 74011000250 HC RX REV CODE- 250: Performed by: EMERGENCY MEDICINE

## 2018-07-09 PROCEDURE — 36600 WITHDRAWAL OF ARTERIAL BLOOD: CPT | Performed by: INTERNAL MEDICINE

## 2018-07-09 PROCEDURE — 77030013140 HC MSK NEB VYRM -A

## 2018-07-09 PROCEDURE — 82550 ASSAY OF CK (CPK): CPT | Performed by: EMERGENCY MEDICINE

## 2018-07-09 PROCEDURE — 74011250636 HC RX REV CODE- 250/636: Performed by: INTERNAL MEDICINE

## 2018-07-09 PROCEDURE — 65660000000 HC RM CCU STEPDOWN

## 2018-07-09 PROCEDURE — 85730 THROMBOPLASTIN TIME PARTIAL: CPT | Performed by: EMERGENCY MEDICINE

## 2018-07-09 PROCEDURE — 77030029684 HC NEB SM VOL KT MONA -A

## 2018-07-09 PROCEDURE — 96374 THER/PROPH/DIAG INJ IV PUSH: CPT

## 2018-07-09 PROCEDURE — 83880 ASSAY OF NATRIURETIC PEPTIDE: CPT | Performed by: EMERGENCY MEDICINE

## 2018-07-09 PROCEDURE — 82803 BLOOD GASES ANY COMBINATION: CPT | Performed by: EMERGENCY MEDICINE

## 2018-07-09 PROCEDURE — 80053 COMPREHEN METABOLIC PANEL: CPT | Performed by: EMERGENCY MEDICINE

## 2018-07-09 PROCEDURE — 94660 CPAP INITIATION&MGMT: CPT

## 2018-07-09 PROCEDURE — 74011250636 HC RX REV CODE- 250/636: Performed by: EMERGENCY MEDICINE

## 2018-07-09 PROCEDURE — 36415 COLL VENOUS BLD VENIPUNCTURE: CPT | Performed by: INTERNAL MEDICINE

## 2018-07-09 PROCEDURE — 84443 ASSAY THYROID STIM HORMONE: CPT | Performed by: EMERGENCY MEDICINE

## 2018-07-09 PROCEDURE — 84484 ASSAY OF TROPONIN QUANT: CPT | Performed by: INTERNAL MEDICINE

## 2018-07-09 PROCEDURE — 82962 GLUCOSE BLOOD TEST: CPT

## 2018-07-09 RX ORDER — IPRATROPIUM BROMIDE AND ALBUTEROL SULFATE 2.5; .5 MG/3ML; MG/3ML
3 SOLUTION RESPIRATORY (INHALATION)
Status: COMPLETED | OUTPATIENT
Start: 2018-07-09 | End: 2018-07-09

## 2018-07-09 RX ORDER — HEPARIN SODIUM 5000 [USP'U]/ML
5000 INJECTION, SOLUTION INTRAVENOUS; SUBCUTANEOUS EVERY 8 HOURS
Status: DISCONTINUED | OUTPATIENT
Start: 2018-07-09 | End: 2018-07-13 | Stop reason: HOSPADM

## 2018-07-09 RX ORDER — METOPROLOL SUCCINATE 25 MG/1
25 TABLET, EXTENDED RELEASE ORAL DAILY
Status: DISCONTINUED | OUTPATIENT
Start: 2018-07-09 | End: 2018-07-11

## 2018-07-09 RX ORDER — ALBUTEROL SULFATE 0.83 MG/ML
2.5 SOLUTION RESPIRATORY (INHALATION)
Status: DISCONTINUED | OUTPATIENT
Start: 2018-07-09 | End: 2018-07-13 | Stop reason: HOSPADM

## 2018-07-09 RX ORDER — NITROGLYCERIN 0.4 MG/1
0.4 TABLET SUBLINGUAL
Status: DISCONTINUED | OUTPATIENT
Start: 2018-07-09 | End: 2018-07-13 | Stop reason: HOSPADM

## 2018-07-09 RX ORDER — ACETAMINOPHEN 325 MG/1
650 TABLET ORAL
Status: DISCONTINUED | OUTPATIENT
Start: 2018-07-09 | End: 2018-07-13 | Stop reason: HOSPADM

## 2018-07-09 RX ORDER — SODIUM CHLORIDE 0.9 % (FLUSH) 0.9 %
5-10 SYRINGE (ML) INJECTION EVERY 8 HOURS
Status: DISCONTINUED | OUTPATIENT
Start: 2018-07-09 | End: 2018-07-13 | Stop reason: HOSPADM

## 2018-07-09 RX ORDER — HYDROXYZINE PAMOATE 25 MG/1
50 CAPSULE ORAL
Status: DISCONTINUED | OUTPATIENT
Start: 2018-07-09 | End: 2018-07-09

## 2018-07-09 RX ORDER — DOCUSATE SODIUM 100 MG/1
100 CAPSULE, LIQUID FILLED ORAL
Status: DISCONTINUED | OUTPATIENT
Start: 2018-07-09 | End: 2018-07-13 | Stop reason: HOSPADM

## 2018-07-09 RX ORDER — IPRATROPIUM BROMIDE AND ALBUTEROL SULFATE 2.5; .5 MG/3ML; MG/3ML
SOLUTION RESPIRATORY (INHALATION)
Status: COMPLETED
Start: 2018-07-09 | End: 2018-07-09

## 2018-07-09 RX ORDER — CYCLOBENZAPRINE HCL 10 MG
5 TABLET ORAL 2 TIMES DAILY
Status: DISCONTINUED | OUTPATIENT
Start: 2018-07-09 | End: 2018-07-13 | Stop reason: HOSPADM

## 2018-07-09 RX ORDER — INSULIN LISPRO 100 [IU]/ML
INJECTION, SOLUTION INTRAVENOUS; SUBCUTANEOUS
Status: DISCONTINUED | OUTPATIENT
Start: 2018-07-09 | End: 2018-07-13 | Stop reason: HOSPADM

## 2018-07-09 RX ORDER — CYCLOBENZAPRINE HCL 5 MG
5 TABLET ORAL 2 TIMES DAILY
Status: ON HOLD | COMMUNITY
End: 2019-03-17 | Stop reason: SDUPTHER

## 2018-07-09 RX ORDER — PRAVASTATIN SODIUM 40 MG/1
40 TABLET ORAL
Status: DISCONTINUED | OUTPATIENT
Start: 2018-07-09 | End: 2018-07-13 | Stop reason: HOSPADM

## 2018-07-09 RX ORDER — FLUTICASONE PROPIONATE 50 MCG
2 SPRAY, SUSPENSION (ML) NASAL DAILY
Status: DISCONTINUED | OUTPATIENT
Start: 2018-07-09 | End: 2018-07-13 | Stop reason: HOSPADM

## 2018-07-09 RX ORDER — IPRATROPIUM BROMIDE AND ALBUTEROL SULFATE 2.5; .5 MG/3ML; MG/3ML
3 SOLUTION RESPIRATORY (INHALATION)
Status: DISCONTINUED | OUTPATIENT
Start: 2018-07-09 | End: 2018-07-10

## 2018-07-09 RX ORDER — HYDROXYZINE 25 MG/1
50 TABLET, FILM COATED ORAL
Status: DISCONTINUED | OUTPATIENT
Start: 2018-07-09 | End: 2018-07-13 | Stop reason: HOSPADM

## 2018-07-09 RX ORDER — INSULIN GLARGINE 100 [IU]/ML
15 INJECTION, SOLUTION SUBCUTANEOUS DAILY
Status: DISCONTINUED | OUTPATIENT
Start: 2018-07-09 | End: 2018-07-11

## 2018-07-09 RX ORDER — DEXTROSE 50 % IN WATER (D50W) INTRAVENOUS SYRINGE
12.5-25 AS NEEDED
Status: DISCONTINUED | OUTPATIENT
Start: 2018-07-09 | End: 2018-07-13 | Stop reason: HOSPADM

## 2018-07-09 RX ORDER — CETIRIZINE HCL 10 MG
10 TABLET ORAL
Status: DISCONTINUED | OUTPATIENT
Start: 2018-07-09 | End: 2018-07-13 | Stop reason: HOSPADM

## 2018-07-09 RX ORDER — MAGNESIUM SULFATE 100 %
4 CRYSTALS MISCELLANEOUS AS NEEDED
Status: DISCONTINUED | OUTPATIENT
Start: 2018-07-09 | End: 2018-07-13 | Stop reason: HOSPADM

## 2018-07-09 RX ORDER — GUAIFENESIN 100 MG/5ML
81 LIQUID (ML) ORAL DAILY
Status: DISCONTINUED | OUTPATIENT
Start: 2018-07-09 | End: 2018-07-13 | Stop reason: HOSPADM

## 2018-07-09 RX ORDER — FUROSEMIDE 40 MG/1
40 TABLET ORAL DAILY
Status: DISCONTINUED | OUTPATIENT
Start: 2018-07-09 | End: 2018-07-11

## 2018-07-09 RX ORDER — AMMONIUM LACTATE 12 G/100G
LOTION TOPICAL
Status: DISCONTINUED | OUTPATIENT
Start: 2018-07-09 | End: 2018-07-09

## 2018-07-09 RX ORDER — METOPROLOL SUCCINATE 25 MG/1
25 TABLET, EXTENDED RELEASE ORAL DAILY
Status: DISCONTINUED | OUTPATIENT
Start: 2018-07-09 | End: 2018-07-09

## 2018-07-09 RX ORDER — AMMONIUM LACTATE 12 G/100G
LOTION TOPICAL DAILY
Status: DISCONTINUED | OUTPATIENT
Start: 2018-07-09 | End: 2018-07-10 | Stop reason: SDUPTHER

## 2018-07-09 RX ORDER — NYSTATIN 100000 U/G
CREAM TOPICAL 2 TIMES DAILY
Status: DISCONTINUED | OUTPATIENT
Start: 2018-07-09 | End: 2018-07-11

## 2018-07-09 RX ORDER — SODIUM CHLORIDE 0.9 % (FLUSH) 0.9 %
5-10 SYRINGE (ML) INJECTION AS NEEDED
Status: DISCONTINUED | OUTPATIENT
Start: 2018-07-09 | End: 2018-07-13 | Stop reason: HOSPADM

## 2018-07-09 RX ORDER — ONDANSETRON 2 MG/ML
4 INJECTION INTRAMUSCULAR; INTRAVENOUS
Status: DISCONTINUED | OUTPATIENT
Start: 2018-07-09 | End: 2018-07-13 | Stop reason: HOSPADM

## 2018-07-09 RX ORDER — GUAIFENESIN 600 MG/1
600 TABLET, EXTENDED RELEASE ORAL EVERY 12 HOURS
Status: DISCONTINUED | OUTPATIENT
Start: 2018-07-09 | End: 2018-07-13 | Stop reason: HOSPADM

## 2018-07-09 RX ADMIN — AZITHROMYCIN MONOHYDRATE 500 MG: 500 INJECTION, POWDER, LYOPHILIZED, FOR SOLUTION INTRAVENOUS at 13:06

## 2018-07-09 RX ADMIN — METOPROLOL SUCCINATE 25 MG: 25 TABLET, EXTENDED RELEASE ORAL at 21:12

## 2018-07-09 RX ADMIN — NYSTATIN: 100000 CREAM TOPICAL at 22:47

## 2018-07-09 RX ADMIN — IPRATROPIUM BROMIDE AND ALBUTEROL SULFATE 3 ML: .5; 3 SOLUTION RESPIRATORY (INHALATION) at 01:37

## 2018-07-09 RX ADMIN — METOPROLOL SUCCINATE 25 MG: 50 TABLET, EXTENDED RELEASE ORAL at 08:37

## 2018-07-09 RX ADMIN — Medication 10 ML: at 22:47

## 2018-07-09 RX ADMIN — INSULIN LISPRO 5 UNITS: 100 INJECTION, SOLUTION INTRAVENOUS; SUBCUTANEOUS at 22:00

## 2018-07-09 RX ADMIN — IPRATROPIUM BROMIDE AND ALBUTEROL SULFATE 3 ML: .5; 3 SOLUTION RESPIRATORY (INHALATION) at 06:10

## 2018-07-09 RX ADMIN — IPRATROPIUM BROMIDE AND ALBUTEROL SULFATE 3 ML: .5; 3 SOLUTION RESPIRATORY (INHALATION) at 20:01

## 2018-07-09 RX ADMIN — IPRATROPIUM BROMIDE AND ALBUTEROL SULFATE 3 ML: .5; 3 SOLUTION RESPIRATORY (INHALATION) at 16:53

## 2018-07-09 RX ADMIN — METHYLPREDNISOLONE SODIUM SUCCINATE 60 MG: 40 INJECTION, POWDER, FOR SOLUTION INTRAMUSCULAR; INTRAVENOUS at 10:42

## 2018-07-09 RX ADMIN — INSULIN GLARGINE 15 UNITS: 100 INJECTION, SOLUTION SUBCUTANEOUS at 09:00

## 2018-07-09 RX ADMIN — IPRATROPIUM BROMIDE AND ALBUTEROL SULFATE 3 ML: .5; 3 SOLUTION RESPIRATORY (INHALATION) at 13:16

## 2018-07-09 RX ADMIN — HEPARIN SODIUM 5000 UNITS: 5000 INJECTION, SOLUTION INTRAVENOUS; SUBCUTANEOUS at 15:22

## 2018-07-09 RX ADMIN — HEPARIN SODIUM 5000 UNITS: 5000 INJECTION, SOLUTION INTRAVENOUS; SUBCUTANEOUS at 08:39

## 2018-07-09 RX ADMIN — Medication 10 ML: at 15:22

## 2018-07-09 RX ADMIN — INSULIN LISPRO 10 UNITS: 100 INJECTION, SOLUTION INTRAVENOUS; SUBCUTANEOUS at 18:36

## 2018-07-09 RX ADMIN — METHYLPREDNISOLONE SODIUM SUCCINATE 40 MG: 40 INJECTION, POWDER, FOR SOLUTION INTRAMUSCULAR; INTRAVENOUS at 15:22

## 2018-07-09 RX ADMIN — METHYLPREDNISOLONE SODIUM SUCCINATE 125 MG: 125 INJECTION, POWDER, FOR SOLUTION INTRAMUSCULAR; INTRAVENOUS at 06:07

## 2018-07-09 RX ADMIN — PRAVASTATIN SODIUM 40 MG: 40 TABLET ORAL at 21:12

## 2018-07-09 RX ADMIN — GUAIFENESIN 600 MG: 600 TABLET, EXTENDED RELEASE ORAL at 15:21

## 2018-07-09 RX ADMIN — HEPARIN SODIUM 5000 UNITS: 5000 INJECTION, SOLUTION INTRAVENOUS; SUBCUTANEOUS at 23:37

## 2018-07-09 RX ADMIN — IPRATROPIUM BROMIDE AND ALBUTEROL SULFATE 3 ML: 2.5; .5 SOLUTION RESPIRATORY (INHALATION) at 06:10

## 2018-07-09 RX ADMIN — FLUTICASONE PROPIONATE 2 SPRAY: 50 SPRAY, METERED NASAL at 13:07

## 2018-07-09 RX ADMIN — INSULIN LISPRO 4 UNITS: 100 INJECTION, SOLUTION INTRAVENOUS; SUBCUTANEOUS at 11:07

## 2018-07-09 RX ADMIN — CYCLOBENZAPRINE HYDROCHLORIDE 5 MG: 10 TABLET, FILM COATED ORAL at 21:10

## 2018-07-09 RX ADMIN — FUROSEMIDE 40 MG: 40 TABLET ORAL at 08:37

## 2018-07-09 RX ADMIN — Medication 10 ML: at 10:46

## 2018-07-09 RX ADMIN — METHYLPREDNISOLONE SODIUM SUCCINATE 40 MG: 40 INJECTION, POWDER, FOR SOLUTION INTRAMUSCULAR; INTRAVENOUS at 22:47

## 2018-07-09 RX ADMIN — ASPIRIN 81 MG 81 MG: 81 TABLET ORAL at 08:37

## 2018-07-09 RX ADMIN — GUAIFENESIN 600 MG: 600 TABLET, EXTENDED RELEASE ORAL at 21:11

## 2018-07-09 NOTE — CONSULTS
[facility]  Pulmonary, Critical Care, and Sleep Medicine    Initial Patient Consult    Name: Peter Mojica MRN: [id number]   : 1970 Hospital: [facility]   Date: 2018        IMPRESSION:   · Acute on Chronic Hypercarbic and Hypoxic Respiratory Failure, pt reports that she is usually on 6L per NC. She was followed by Dr. Vinicio Mackay but has not seen him for a long time. · Had acute on chronic worsening of dyspnea. · Super Morbid Obesity  · Peripheral Edema  · DM  · Hypertension  · Hyperlipidemia. · Hx of Heart failure, Diastolic Dysfunction. NO mention of Right sided heart dysfunction. · Mild Anemia  · ? ?GEN on home CPAP or bipap but has not been using it recently. · Critically ill, 35 min CC, EOP. The reason for providing this level of medical care was due to a critical illness that impaired one or more vital organ systems, severe hypoxia and hypercarbic respiratory failure, such that there was a high probability of imminent or life threatening deterioration in the patient's condition. This care involved high complexity decision making which includes reviewing the patient's past medical records, current laboratory results, and actual Xray films in order to assess, support vital system function, and to treat this degree of vital organ system failure, and to prevent further life threatening deterioration of the patients condition      RECOMMENDATIONS:   · Agree with current bipap use, Make sure pt uses the BIPAP at night and prn during day. · On Nebs  · On lasix po  · ON Azithromycin IV  · Decreased dose of steroids  · Oxygen to keep POx over 90%  · Glycemic monitor and control especially while on steroids  · ON DVT prophylaxis dosing heparin. Subjective:     Chief complaint: shortness of breath. Fatigue. This patient has been seen and evaluated at the request of Dr. Lianet Barker for above.  Patient is a 57 y.o. female   Who presents for evaluation of above. Appears to have right sided heart dysfunction. Has been occurring for the last  4 days. Brian Sumner is on about 6 L of oxygen by nasal cannula at home and lately started having worsening of shortness of breath.  She reports shortness of breath is worsened with exertion with orthopnea but no PND. Brian Sumner also reports chronic cough with increased quantities of phlegm mainly white or clear.  She is also having difficulty using her nebulizer machine at home. Brian Sumner has not been using her bipap machine recently. Denies chest pain, palpitations or syncope.  Denies abdominal pain or diarrhea. Has been able to eat and drink well. Has not been sleeping well at night recently. On arrival to the hospital, she was tachypneic and had a blood gas which revealed a PCO2 of 75.  She received Solu-Medrol, DuoNeb and was started on BiPAP. Since being started on treatments she was feeling better. She was seen upstairs in her room. She was tolerating bipap well. She reports she was ready to eat her breakfast when seen this am.             Past Medical History:   Diagnosis Date    Arthritis     Asthma     CAD (coronary artery disease)     Congestive heart failure ([facility] [address]     COPD (chronic obstructive pulmonary disease) ([facility] [address]     Diabetes ([facility] [address]     Hypertension     Morbid obesity with BMI of 70 and over, adult ([address] [address]     NSTEMI (non-ST elevated myocardial infarction) ([facility] [address]     SVT (supraventricular tachycardia) ([address]       Past Surgical History:   Procedure Laterality Date    CARDIAC SURG PROCEDURE UNLIST      Stents    HX  SECTION      HX ORTHOPAEDIC      HX OTHER SURGICAL      cyst removed from back      Prior to Admission medications    Medication Sig Start Date End Date Taking? Authorizing Provider   metoprolol succinate (TOPROL-XL) 25 mg XL tablet Take  by mouth daily. Landon Raza MD   furosemide (LASIX) 40 mg tablet Take  by mouth daily.     Landon Raza MD   albuterol (PROVENTIL VENTOLIN) 2.5 mg /3 mL (0.083 %) nebulizer solution 3 mL by Nebulization route every four (4) hours as needed for Wheezing. 2/3/18   Heath Chavez MD   aspirin 81 mg chewable tablet Take 81 mg by mouth daily. Historical Provider   insulin glargine (LANTUS) 100 unit/mL injection 15 Units by SubCUTAneous route daily. Historical Provider   traMADol (ULTRAM) 50 mg tablet Take 1 Tab by mouth every eight (8) hours as needed. Max Daily Amount: 150 mg. 1/24/18   Jose Ramon Bello MD   Nebulizer & Compressor machine 1 Each by Does Not Apply route daily as needed. 1/24/18   Jose Ramon Bello MD   hydrOXYzine pamoate (VISTARIL) 50 mg capsule Take 50 mg by mouth every eight (8) hours as needed for Itching. Indications: Pruritus of Skin    Historical Provider   nystatin (MYCOSTATIN) topical cream Apply  to affected area two (2) times daily as needed for Skin Irritation or Itching. Historical Provider   fluticasone (FLONASE) 50 mcg/actuation nasal spray 2 Sprays by Both Nostrils route daily. 9/9/17   Nesha Gallardo MD   lovastatin (MEVACOR) 40 mg tablet Take 1 Tab by mouth nightly. 1/14/16   Milo Diaz NP   glyBURIDE (DIABETA) 5 mg tablet Take 5 mg by mouth Daily (before breakfast). Historical Provider   cetirizine (ZYRTEC) 10 mg tablet Take 10 mg by mouth daily as needed for Allergies. Historical Provider   ketoconazole (NIZORAL) 2 % topical cream Apply  to affected area daily. Historical Provider   ammonium lactate (LAC-HYDRIN) 12 % topical cream rub in to affected area well 11/21/14   Phil Main MD   nitroglycerin (NITROSTAT) 0.4 mg SL tablet 1 Tab by SubLINGual route every five (5) minutes as needed for Chest Pain (call 911 if not relieved by 3).  9/12/13   Milo Diaz NP     No Known Allergies   Social History   Substance Use Topics    Smoking status: Current Every Day Smoker     Packs/day: 0.25     Types: Cigarettes    Smokeless tobacco: Never Used      Comment: Patient states \"I  aint smoking no more d*mn cigarettes after yesterday\" 2018    Alcohol use No      Family History   Problem Relation Age of Onset    Heart Disease Mother     Heart Disease Father     Heart Disease Sister         Current Facility-Administered Medications   Medication Dose Route Frequency    aspirin chewable tablet 81 mg  81 mg Oral DAILY    fluticasone (FLONASE) 50 mcg/actuation nasal spray 2 Spray  2 Spray Both Nostrils DAILY    furosemide (LASIX) tablet 40 mg  40 mg Oral DAILY    insulin glargine (LANTUS) injection 15 Units  15 Units SubCUTAneous DAILY    pravastatin (PRAVACHOL) tablet 40 mg  40 mg Oral QHS    metoprolol succinate (TOPROL-XL) XL tablet 25 mg  25 mg Oral DAILY    sodium chloride (NS) flush 5-10 mL  5-10 mL IntraVENous Q8H    heparin (porcine) injection 5,000 Units  5,000 Units SubCUTAneous Q8H    albuterol-ipratropium (DUO-NEB) 2.5 MG-0.5 MG/3 ML  3 mL Nebulization Q4HWA RT    insulin lispro (HUMALOG) injection   SubCUTAneous AC&HS    azithromycin (ZITHROMAX) 500 mg in 0.9% sodium chloride (MBP/ADV) 250 mL  500 mg IntraVENous Q24H    guaiFENesin ER (MUCINEX) tablet 600 mg  600 mg Oral Q12H    methylPREDNISolone (PF) (SOLU-MEDROL) injection 40 mg  40 mg IntraVENous Q8H       Review of Systems:  A comprehensive review of systems was negative.     Objective:   Vital Signs:    Visit Vitals    /68 (BP 1 Location: Right arm, BP Patient Position: Sitting)  Comment (BP 1 Location): forearm    Pulse 90    Temp 98.7 °F (37.1 °C)    Resp 20    Wt (!) 199.6 kg (440 lb)    SpO2 93%    BMI 71.02 kg/m2       O2 Device: BIPAP   O2 Flow Rate (L/min): 6 l/min   Temp (24hrs), Av.3 °F (36.8 °C), Min:97.8 °F (36.6 °C), Max:98.7 °F (37.1 °C)       Intake/Output:   Last shift:      701 -  1900  In: 440 [P.O.:440]  Out: 800 [Urine:800]  Last 3 shifts:      Intake/Output Summary (Last 24 hours) at 18 1318  Last data filed at 18 1312   Gross per 24 hour   Intake              440 ml   Output 800 ml   Net             -360 ml      Physical Exam:   General:  Morbidly obese, female, Alert, cooperative, no distress, appears stated age. On bipap when seen. Head:  Normocephalic, without obvious abnormality, atraumatic. Eyes:  Conjunctivae/corneas clear. PERRL, EOMs intact. Nose: Nares normal. Septum midline. Mucosa normal. No drainage or sinus tenderness. Throat: Lips, mucosa, and tongue normal. Teeth and gums normal.   Neck: Supple, symmetrical, trachea midline, no adenopathy, thyroid: no enlargment/tenderness/nodules, no carotid bruit and no JVD. Back:   Symmetric, no curvature. ROM normal.   Lungs:   Clear to auscultation bilaterally. Chest wall:  No tenderness or deformity. Heart:  Regular rate and rhythm, S1, S2 normal, no murmur, click, rub or gallop. Abdomen:   Obese, Soft, non-tender. Bowel sounds normal. No masses,  No organomegaly. Extremities: Extremities normal, atraumatic, no cyanosis, 3+ bilateral edema. Brawny edema. Pulses: 2+ and symmetric all extremities. Skin: Skin color, texture, turgor normal. No rashes or lesions   Lymph nodes: Cervical, supraclavicular, and axillary nodes normal.   Neurologic: Grossly nonfocal, has good strength of BUE and BLE. Psych: No anxiety or depression.       Data review:     Recent Results (from the past 24 hour(s))   EKG, 12 LEAD, INITIAL    Collection Time: 07/08/18 10:57 PM   Result Value Ref Range    Ventricular Rate 82 BPM    Atrial Rate 82 BPM    P-R Interval 184 ms    QRS Duration 80 ms    Q-T Interval 366 ms    QTC Calculation (Bezet) 427 ms    Calculated P Axis 37 degrees    Calculated R Axis 50 degrees    Calculated T Axis 39 degrees    Diagnosis       Normal sinus rhythm  Low voltage QRS  When compared with ECG of 25-APR-2018 17:49,  No significant change was found     CK W/ CKMB & INDEX    Collection Time: 07/09/18 12:06 AM   Result Value Ref Range    CK 42 26 - 192 U/L    CK - MB <1.0 <3.6 NG/ML    CK-MB Index Cannot be calculated 0 - 2.5     METABOLIC PANEL, COMPREHENSIVE    Collection Time: 07/09/18 12:06 AM   Result Value Ref Range    Sodium 138 136 - 145 mmol/L    Potassium 4.4 3.5 - 5.1 mmol/L    Chloride 100 97 - 108 mmol/L    CO2 34 (H) 21 - 32 mmol/L    Anion gap 4 (L) 5 - 15 mmol/L    Glucose 140 (H) 65 - 100 mg/dL    BUN 15 6 - 20 MG/DL    Creatinine 0.82 0.55 - 1.02 MG/DL    BUN/Creatinine ratio 18 12 - 20      GFR est AA >60 >60 ml/min/1.73m2    GFR est non-AA >60 >60 ml/min/1.73m2    Calcium 10.7 (H) 8.5 - 10.1 MG/DL    Bilirubin, total 0.3 0.2 - 1.0 MG/DL    ALT (SGPT) 23 12 - 78 U/L    AST (SGOT) 20 15 - 37 U/L    Alk.  phosphatase 96 45 - 117 U/L    Protein, total 8.7 (H) 6.4 - 8.2 g/dL    Albumin 3.2 (L) 3.5 - 5.0 g/dL    Globulin 5.5 (H) 2.0 - 4.0 g/dL    A-G Ratio 0.6 (L) 1.1 - 2.2     NT-PRO BNP    Collection Time: 07/09/18 12:06 AM   Result Value Ref Range    NT pro- (H) 0 - 125 PG/ML   TROPONIN I    Collection Time: 07/09/18 12:06 AM   Result Value Ref Range    Troponin-I, Qt. <0.05 <0.05 ng/mL   TSH 3RD GENERATION    Collection Time: 07/09/18 12:06 AM   Result Value Ref Range    TSH 2.87 0.36 - 3.74 uIU/mL   BLOOD GAS, ARTERIAL    Collection Time: 07/09/18 12:13 AM   Result Value Ref Range    pH 7.30 (L) 7.35 - 7.45      PCO2 75 (H) 35.0 - 45.0 mmHg    PO2 75 (L) 80 - 100 mmHg    O2 SAT 93 92 - 97 %    BICARBONATE 36 (H) 22 - 26 mmol/L    BASE EXCESS 6.6 mmol/L    O2 METHOD NASAL O2      O2 FLOW RATE 5.00 L/min    SPONTANEOUS RATE 24.0      Sample source ARTERIAL      SITE RIGHT RADIAL      PARUL'S TEST YES      Critical value read back MD ALANA    BLOOD GAS, ARTERIAL    Collection Time: 07/09/18  3:12 AM   Result Value Ref Range    pH 7.35 7.35 - 7.45      PCO2 69 (H) 35.0 - 45.0 mmHg    PO2 171 (H) 80 - 100 mmHg    O2 SAT 99 (H) 92 - 97 %    BICARBONATE 38 (H) 22 - 26 mmol/L    BASE EXCESS 9.2 mmol/L    O2 METHOD BIPAP      FIO2 50 %    SET RATE 4      SPONTANEOUS RATE 29.0      IPAP/PIP 20.0 EPAP/CPAP/PEEP 10.0      Sample source ARTERIAL      SITE LEFT BRACHIAL      PARUL'S TEST N/A      Critical value read back NORMA MEDRANO    PROTHROMBIN TIME + INR    Collection Time: 07/09/18  3:15 AM   Result Value Ref Range    INR 1.0 0.9 - 1.1      Prothrombin time 10.2 9.0 - 11.1 sec   PTT    Collection Time: 07/09/18  3:15 AM   Result Value Ref Range    aPTT 22.7 22.1 - 32.0 sec    aPTT, therapeutic range     58.0 - 77.0 SECS   TROPONIN I    Collection Time: 07/09/18  8:32 AM   Result Value Ref Range    Troponin-I, Qt. <0.05 <0.05 ng/mL   BLOOD GAS, ARTERIAL    Collection Time: 07/09/18 10:28 AM   Result Value Ref Range    pH 7.40 7.35 - 7.45      PCO2 60 (H) 35.0 - 45.0 mmHg    PO2 76 (L) 80 - 100 mmHg    O2 SAT 95 92 - 97 %    BICARBONATE 37 (H) 22 - 26 mmol/L    BASE EXCESS 9.4 mmol/L    O2 METHOD BIPAP      FIO2 40 %    MODE BIPAP      SET RATE 4      IPAP/PIP 20.0      EPAP/CPAP/PEEP 10.0      Sample source ARTERIAL      SITE RIGHT RADIAL      PARUL'S TEST N/A     GLUCOSE, POC    Collection Time: 07/09/18 10:39 AM   Result Value Ref Range    Glucose (POC) 245 (H) 65 - 100 mg/dL    Performed by Vasu Harris      EKG: NSR       Imaging:  I have personally reviewed the patients radiographs and have reviewed the reports:  7-8-18: COMPARISON:  April 25, 2018     FINDINGS: A portable AP radiograph of the chest was obtained at 2316 hours. The  patient is on a cardiac monitor. The lungs are hyperinflated. The lung bases  are obscured due to overlying soft tissue. The cardiac and mediastinal contours  and pulmonary vascularity are normal.  The bones and soft tissues are grossly  within normal limits.      IMPRESSION: No acute findings.         Ivin Leyden, MD

## 2018-07-09 NOTE — PROGRESS NOTES
10:15 AM pt does not want bariatric bed, called [facility] to bring regular bed from [facility] and cancelled [facility] order for bariatric bed.

## 2018-07-09 NOTE — ED NOTES
Assumed care of patient. Patient positioned in chair for comfort with Bipap. Patient refusing to allow this RN to assist her into gown or to be placed on monitor.

## 2018-07-09 NOTE — H&P
Hospitalist Admission Note NAME: Jaun Aguilar :  1970 MRN:  [id number] Date/Time:  2018 12:10 PM 
 
Patient PCP: Dwayne Silvestre NP 
________________________________________________________________________ My assessment of this patient's clinical condition and my plan of care is as follows. Assessment / Plan: 
Acute on chronic hypercarbic respiratory failure due to acute COPD exacerbation Obstructive sleep apnea Start Solu-Medrol, DuoNeb and azithromycin.  Start Mucinex. Continue BiPAP and repeat ABG.  Continue home inhalers.  Continue home CPAP. Consult pulmonary. Diastolic congestive heart failure compensated Resume home on Lasix, metoprolol Diabetes mellitus Hypertension Dyslipidemia Hold home glipizide and start insulin sliding scale and resume home lantus Resume metoprolol Resume statin Morbid obesity I have provided    50   minutes of critical care time. During this entire length of time I was immediately available to the patient.   
  
The reason for providing this level of medical care was due to a critical illness that impaired one or more vital organ systems, such that there was a high probability of imminent or life threatening deterioration in the patient's condition. This care involved high complexity decision making which includes reviewing the patient's past medical records, current laboratory results, and actual Xray films in order to assess, support vital system function, and to treat this degree of vital organ system failure, and to prevent further life threatening deterioration of the patients condition.    
   
  
     
   
Code Status: Full Surrogate Decision Maker: Sanford Price DVT Prophylaxis: heparin GI Prophylaxis: not indicated Baseline: From home independent Subjective: CHIEF COMPLAINT: Sob HISTORY OF PRESENT ILLNESS:    
This is a 53-year-old female with past medical history of COPD, congestive heart failure is coming to the hospital with chief complaints of shortness of breath since the last 4 days. Irina Coker is on about 6 L of oxygen by nasal cannula at home and lately started having worsening of shortness of breath.  She reports shortness of breath is worsened with exertion with orthopnea but no PND. Irina Coker also reports chronic cough with increased quantities of phlegm.  She is also having difficulty using her nebulizer machine at home. Katherin Villareal does not report any chest pain, palpitations or syncopal episodes.  Denies abdominal pain or diarrhea. On arrival to the hospital, she was tachypneic and had a blood gas which revealed a PCO2 of 75.  She received Solu-Medrol, DuoNeb and was started on BiPAP. We were asked to admit for work up and evaluation of the above problems. Past Medical History:  
Diagnosis Date  Arthritis  Asthma  CAD (coronary artery disease)  Congestive heart failure ([facility]  COPD (chronic obstructive pulmonary disease) ([facility])  Diabetes [facility]  Hypertension  Morbid obesity with BMI of 70 and over, adult ([facility]  NSTEMI (non-ST elevated myocardial infarction) ([facility]  SVT (supraventricular tachycardia) ([facility]) Past Surgical History:  
Procedure Laterality Date  CARDIAC SURG PROCEDURE UNLIST Stents  HX  SECTION    
 HX ORTHOPAEDIC    
 HX OTHER SURGICAL    
 cyst removed from back Social History Substance Use Topics  Smoking status: Current Every Day Smoker Packs/day: 0.25 Types: Cigarettes  Smokeless tobacco: Never Used Comment: Patient states \"I  aint smoking no more d*mn cigarettes after yesterday\" 2018  Alcohol use No  
  
 
Family History Problem Relation Age of Onset  Heart Disease Mother  Heart Disease Father  Heart Disease Sister No Known Allergies Prior to Admission medications Medication Sig Start Date End Date Taking?  Authorizing Provider  
metoprolol succinate (TOPROL-XL) 25 mg XL tablet Take  by mouth daily. Landon Raza MD  
furosemide (LASIX) 40 mg tablet Take  by mouth daily. Landon Raza MD  
albuterol (PROVENTIL VENTOLIN) 2.5 mg /3 mL (0.083 %) nebulizer solution 3 mL by Nebulization route every four (4) hours as needed for Wheezing. 2/3/18   Nik Ghosh MD  
aspirin 81 mg chewable tablet Take 81 mg by mouth daily. Historical Provider  
insulin glargine (LANTUS) 100 unit/mL injection 15 Units by SubCUTAneous route daily. Historical Provider  
traMADol (ULTRAM) 50 mg tablet Take 1 Tab by mouth every eight (8) hours as needed. Max Daily Amount: 150 mg. 1/24/18   Fabienne Rodríguez MD  
Nebulizer & Compressor machine 1 Each by Does Not Apply route daily as needed. 1/24/18   Fabienne Rodríguez MD  
hydrOXYzine pamoate (VISTARIL) 50 mg capsule Take 50 mg by mouth every eight (8) hours as needed for Itching. Indications: Pruritus of Skin    Historical Provider  
nystatin (MYCOSTATIN) topical cream Apply  to affected area two (2) times daily as needed for Skin Irritation or Itching. Historical Provider  
fluticasone (FLONASE) 50 mcg/actuation nasal spray 2 Sprays by Both Nostrils route daily. 9/9/17   Nesha Gallardo MD  
lovastatin (MEVACOR) 40 mg tablet Take 1 Tab by mouth nightly. 1/14/16   Delisa Champion NP  
glyBURIDE (DIABETA) 5 mg tablet Take 5 mg by mouth Daily (before breakfast). Historical Provider  
cetirizine (ZYRTEC) 10 mg tablet Take 10 mg by mouth daily as needed for Allergies. Historical Provider  
ketoconazole (NIZORAL) 2 % topical cream Apply  to affected area daily. Historical Provider  
ammonium lactate (LAC-HYDRIN) 12 % topical cream rub in to affected area well 11/21/14   Margarito Simmons MD  
nitroglycerin (NITROSTAT) 0.4 mg SL tablet 1 Tab by SubLINGual route every five (5) minutes as needed for Chest Pain (call 911 if not relieved by 3).  9/12/13   Delisa Champion NP  
 
 
REVIEW OF SYSTEMS:    
I am not able to complete the review of systems because: The patient is intubated and sedated The patient has altered mental status due to his acute medical problems The patient has baseline aphasia from prior stroke(s) The patient has baseline dementia and is not reliable historian The patient is in acute medical distress and unable to provide information Total of 12 systems reviewed as follows:   
   POSITIVE= underlined text  Negative = text not underlined General:  fever, chills, sweats, generalized weakness, weight loss/gain,  
   loss of appetite Eyes:    blurred vision, eye pain, loss of vision, double vision ENT:    rhinorrhea, pharyngitis Respiratory:   cough, sputum production, SOB, RAZO, wheezing, pleuritic pain  
Cardiology:   chest pain, palpitations, orthopnea, PND, edema, syncope Gastrointestinal:  abdominal pain , N/V, diarrhea, dysphagia, constipation, bleeding Genitourinary:  frequency, urgency, dysuria, hematuria, incontinence Muskuloskeletal :  arthralgia, myalgia, back pain Hematology:  easy bruising, nose or gum bleeding, lymphadenopathy Dermatological: rash, ulceration, pruritis, color change / jaundice Endocrine:   hot flashes or polydipsia Neurological:  headache, dizziness, confusion, focal weakness, paresthesia, Speech difficulties, memory loss, gait difficulty Psychological: Feelings of anxiety, depression, agitation Objective: VITALS:   
Visit Vitals  /68 (BP 1 Location: Right arm, BP Patient Position: Sitting)  Pulse 90  Temp 98.7 °F (37.1 °C)  Resp 20  Wt (!) 199.6 kg (440 lb)  SpO2 94%  BMI 71.02 kg/m2 PHYSICAL EXAM: 
 
General:    In mild  distress, appears stated age, obese HEENT: Atraumatic, anicteric sclerae, pink conjunctivae No oral ulcers, mucosa moist, throat clear, dentition fair Neck:  Supple, symmetrical,  thyroid: non tender Lungs:   Poor air entry, wheezing +, no crackles Chest wall:  No tenderness  No Accessory muscle use. 
Heart:   Regular  rhythm,  No  murmur   2 +  edema Abdomen:   Soft, non-tender. Not distended. Bowel sounds normal 
Extremities: No cyanosis. No clubbing,   
  Skin turgor normal, Capillary refill normal, Radial dial pulse 2+ Skin:     Dry skin to both legs Psych:  Not anxious or agitated. Neurologic: EOMs intact. No facial asymmetry. No aphasia or slurred speech. Symmetrical strength, Sensation grossly intact. Alert and awake 
 
_______________________________________________________________________ Care Plan discussed with: 
  Comments Patient y Family RN y   
Care Manager Consultant:     
_______________________________________________________________________ Expected  Disposition:  
Home with Family y HH/PT/OT/RN   
SNF/LTC   
LUIS A   
________________________________________________________________________ TOTAL TIME: 60  Minutes Critical Care Provided     Minutes non procedure based Comments  
 y Reviewed previous records  
>50% of visit spent in counseling and coordination of care y Discussion with patient and/or family and questions answered 
  
 
________________________________________________________________________ Signed: Romana Gainer, MD 
 
Procedures: see electronic medical records for all procedures/Xrays and details which were not copied into this note but were reviewed prior to creation of Plan. LAB DATA REVIEWED:   
Recent Results (from the past 24 hour(s)) EKG, 12 LEAD, INITIAL Collection Time: 07/08/18 10:57 PM  
Result Value Ref Range Ventricular Rate 82 BPM  
 Atrial Rate 82 BPM  
 P-R Interval 184 ms QRS Duration 80 ms  
 Q-T Interval 366 ms  
 QTC Calculation (Bezet) 427 ms Calculated P Axis 37 degrees Calculated R Axis 50 degrees Calculated T Axis 39 degrees Diagnosis Normal sinus rhythm Low voltage QRS When compared with ECG of 25-APR-2018 17:49, No significant change was found CK W/ CKMB & INDEX Collection Time: 07/09/18 12:06 AM  
Result Value Ref Range CK 42 26 - 192 U/L  
 CK - MB <1.0 <3.6 NG/ML  
 CK-MB Index Cannot be calculated 0 - 2.5 METABOLIC PANEL, COMPREHENSIVE Collection Time: 07/09/18 12:06 AM  
Result Value Ref Range Sodium 138 136 - 145 mmol/L Potassium 4.4 3.5 - 5.1 mmol/L Chloride 100 97 - 108 mmol/L  
 CO2 34 (H) 21 - 32 mmol/L Anion gap 4 (L) 5 - 15 mmol/L Glucose 140 (H) 65 - 100 mg/dL BUN 15 6 - 20 MG/DL Creatinine 0.82 0.55 - 1.02 MG/DL  
 BUN/Creatinine ratio 18 12 - 20 GFR est AA >60 >60 ml/min/1.73m2 GFR est non-AA >60 >60 ml/min/1.73m2 Calcium 10.7 (H) 8.5 - 10.1 MG/DL Bilirubin, total 0.3 0.2 - 1.0 MG/DL  
 ALT (SGPT) 23 12 - 78 U/L  
 AST (SGOT) 20 15 - 37 U/L Alk. phosphatase 96 45 - 117 U/L Protein, total 8.7 (H) 6.4 - 8.2 g/dL Albumin 3.2 (L) 3.5 - 5.0 g/dL Globulin 5.5 (H) 2.0 - 4.0 g/dL A-G Ratio 0.6 (L) 1.1 - 2.2 NT-PRO BNP Collection Time: 07/09/18 12:06 AM  
Result Value Ref Range NT pro- (H) 0 - 125 PG/ML  
TROPONIN I Collection Time: 07/09/18 12:06 AM  
Result Value Ref Range Troponin-I, Qt. <0.05 <0.05 ng/mL TSH 3RD GENERATION Collection Time: 07/09/18 12:06 AM  
Result Value Ref Range TSH 2.87 0.36 - 3.74 uIU/mL BLOOD GAS, ARTERIAL Collection Time: 07/09/18 12:13 AM  
Result Value Ref Range pH 7.30 (L) 7.35 - 7.45    
 PCO2 75 (H) 35.0 - 45.0 mmHg PO2 75 (L) 80 - 100 mmHg O2 SAT 93 92 - 97 % BICARBONATE 36 (H) 22 - 26 mmol/L  
 BASE EXCESS 6.6 mmol/L  
 O2 METHOD NASAL O2    
 O2 FLOW RATE 5.00 L/min SPONTANEOUS RATE 24.0 Sample source ARTERIAL    
 SITE RIGHT RADIAL PARUL'S TEST YES Critical value read back MD ALANA   
BLOOD GAS, ARTERIAL Collection Time: 07/09/18  3:12 AM  
Result Value Ref Range pH 7.35 7.35 - 7.45    
 PCO2 69 (H) 35.0 - 45.0 mmHg PO2 171 (H) 80 - 100 mmHg O2 SAT 99 (H) 92 - 97 %  BICARBONATE 38 (H) 22 - 26 mmol/L  
 BASE EXCESS 9.2 mmol/L  
 O2 METHOD BIPAP    
 FIO2 50 % SET RATE 4    
 SPONTANEOUS RATE 29.0 IPAP/PIP 20.0 EPAP/CPAP/PEEP 10.0 Sample source ARTERIAL    
 SITE LEFT BRACHIAL PARUL'S TEST N/A Critical value read back MARCELA,RN   
PROTHROMBIN TIME + INR Collection Time: 07/09/18  3:15 AM  
Result Value Ref Range INR 1.0 0.9 - 1.1 Prothrombin time 10.2 9.0 - 11.1 sec PTT Collection Time: 07/09/18  3:15 AM  
Result Value Ref Range aPTT 22.7 22.1 - 32.0 sec  
 aPTT, therapeutic range     58.0 - 77.0 SECS  
TROPONIN I Collection Time: 07/09/18  8:32 AM  
Result Value Ref Range Troponin-I, Qt. <0.05 <0.05 ng/mL BLOOD GAS, ARTERIAL Collection Time: 07/09/18 10:28 AM  
Result Value Ref Range pH 7.40 7.35 - 7.45    
 PCO2 60 (H) 35.0 - 45.0 mmHg PO2 76 (L) 80 - 100 mmHg O2 SAT 95 92 - 97 % BICARBONATE 37 (H) 22 - 26 mmol/L  
 BASE EXCESS 9.4 mmol/L  
 O2 METHOD BIPAP    
 FIO2 40 % MODE BIPAP    
 SET RATE 4    
 IPAP/PIP 20.0 EPAP/CPAP/PEEP 10.0 Sample source ARTERIAL    
 SITE RIGHT RADIAL PARUL'S TEST N/A    
GLUCOSE, POC Collection Time: 07/09/18 10:39 AM  
Result Value Ref Range Glucose (POC) 245 (H) 65 - 100 mg/dL Performed by Sydnee Diamond

## 2018-07-09 NOTE — ED NOTES
Patient assisted onto bedside commode to void. Patient allowed this RN to assist her into gown and place on monitor x3.

## 2018-07-09 NOTE — ED NOTES
Pt refusing to lay in bed - sitting on side of bed at this time. Pt refusing to have cardiac monitor placed at this time.

## 2018-07-09 NOTE — ED PROVIDER NOTES
EMERGENCY DEPARTMENT HISTORY AND PHYSICAL EXAM 
     
 
Date: 7/8/2018 Patient Name: Zee Rojo History of Presenting Illness Chief Complaint Patient presents with  Shortness of Breath  
  intermittent SOB x several days, has not been able to use home nebulizer, usually on 6L NC at 97%, EMS placed on 15L NRB for comfort, hx of CHF History Provided By: Patient and EMS 
 
HPI: Zee Rojo is a 57 y.o. female, pmhx asthma / arthritis / CHF / HTN / NSTEMI / CAD / COPD / DM, who presents via EMS to the ED c/o progressively worsening SOB over the last several days. Pt reports an additional dry cough with intermittently blood tinged sputum and chest pain only with deep inspiration. She notes her nebulizer is currently broken and denies any recent follow up with her PCP. Pt denies any recent medications for her current sxs. She notes using 6L NC at baseline and denies any recent changes. EMS reports pt had an SpO2 97% on her baseline 6L NC; they placed pt on 15L NRB for comfort. Pt states she does not use the nebulizer that case management previously provided for her, noting \"it doesn't work for me I don't get relief. \" She reports borrowing a friends nebulizer, but additionally notes \"it just broke on me. \" Pt notes compliance with her diuretics, but states she did not have a chance to take her dose tonight. She reports a hx of CHF and asthma, but denies any prior need for intubation. Pt otherwise specifically denies any recent fevers, chills, nausea, vomiting, diarrhea, abd pain, CP, urinary sxs, changes in BM, or headache. PCP: Filippo Mena NP Allergies: NKDA Social Hx: +tobacco (0.25 ppd), -EtOH, -Illicit Drugs There are no other complaints, changes, or physical findings at this time. Current Facility-Administered Medications Medication Dose Route Frequency Provider Last Rate Last Dose  albuterol-ipratropium (DUO-NEB) 2.5 mg-0.5 mg/3 ml nebulizer solution  albuterol-ipratropium (DUO-NEB) 2.5 MG-0.5 MG/3 ML  3 mL Nebulization NOW Andi Cerda MD      
 methylPREDNISolone (PF) (SOLU-MEDROL) injection 125 mg  125 mg IntraVENous ONCE Andi Cerda MD      
 sodium chloride (NS) flush 5-10 mL  5-10 mL IntraVENous PRN Andi Cerda MD      
 
Current Outpatient Prescriptions Medication Sig Dispense Refill  metoprolol succinate (TOPROL-XL) 25 mg XL tablet Take  by mouth daily.  furosemide (LASIX) 40 mg tablet Take  by mouth daily.  albuterol (PROVENTIL VENTOLIN) 2.5 mg /3 mL (0.083 %) nebulizer solution 3 mL by Nebulization route every four (4) hours as needed for Wheezing. 24 Each 0  
 aspirin 81 mg chewable tablet Take 81 mg by mouth daily.  insulin glargine (LANTUS) 100 unit/mL injection 15 Units by SubCUTAneous route daily.  traMADol (ULTRAM) 50 mg tablet Take 1 Tab by mouth every eight (8) hours as needed. Max Daily Amount: 150 mg. 15 Tab 0  
 Nebulizer & Compressor machine 1 Each by Does Not Apply route daily as needed. 1 Each 0  
 hydrOXYzine pamoate (VISTARIL) 50 mg capsule Take 50 mg by mouth every eight (8) hours as needed for Itching. Indications: Pruritus of Skin  nystatin (MYCOSTATIN) topical cream Apply  to affected area two (2) times daily as needed for Skin Irritation or Itching.  fluticasone (FLONASE) 50 mcg/actuation nasal spray 2 Sprays by Both Nostrils route daily. 1 Bottle 0  
 lovastatin (MEVACOR) 40 mg tablet Take 1 Tab by mouth nightly. 30 Tab 6  
 glyBURIDE (DIABETA) 5 mg tablet Take 5 mg by mouth Daily (before breakfast).  cetirizine (ZYRTEC) 10 mg tablet Take 10 mg by mouth daily as needed for Allergies.  ketoconazole (NIZORAL) 2 % topical cream Apply  to affected area daily.     
 ammonium lactate (LAC-HYDRIN) 12 % topical cream rub in to affected area well 280 g 1  
 nitroglycerin (NITROSTAT) 0.4 mg SL tablet 1 Tab by SubLINGual route every five (5) minutes as needed for Chest Pain (call 911 if not relieved by 3). 25 Tab 2 Past History Past Medical History: 
Past Medical History:  
Diagnosis Date  Arthritis  Asthma  CAD (coronary artery disease)  Congestive heart failure ([facility] [place] [facility]  COPD (chronic obstructive pulmonary disease) ([facility])  Diabetes ([facility] [place] [facility]  Hypertension  Morbid obesity with BMI of 70 and over, adult ([facility] [place] [facility]  NSTEMI (non-ST elevated myocardial infarction) ([facility] [place] [facility]  SVT (supraventricular tachycardia) ([facility]) Past Surgical History: 
Past Surgical History:  
Procedure Laterality Date  CARDIAC SURG PROCEDURE UNLIST Stents  HX  SECTION    
 HX ORTHOPAEDIC    
 HX OTHER SURGICAL    
 cyst removed from back Family History: 
Family History Problem Relation Age of Onset  Heart Disease Mother  Heart Disease Father  Heart Disease Sister Social History: 
Social History Substance Use Topics  Smoking status: Current Every Day Smoker Packs/day: 0.25 Types: Cigarettes  Smokeless tobacco: Never Used Comment: Patient states \"I  aint smoking no more d*mn cigarettes after yesterday\" 2018  Alcohol use No  
 
 
Allergies: 
No Known Allergies Review of Systems Review of Systems Constitutional: Negative. Negative for chills and fever. HENT: Negative. Negative for congestion, facial swelling, rhinorrhea, sore throat, trouble swallowing and voice change. Eyes: Negative. Respiratory: Positive for cough and shortness of breath. Negative for apnea, chest tightness and wheezing. +blood tinged sputum Cardiovascular: Negative. Negative for chest pain, palpitations and leg swelling. Gastrointestinal: Negative. Negative for abdominal distention, abdominal pain, blood in stool, constipation, diarrhea, nausea and vomiting. Endocrine: Negative. Negative for cold intolerance, heat intolerance and polyuria. Genitourinary: Negative.   Negative for difficulty urinating, dysuria, flank pain, frequency, hematuria and urgency. Musculoskeletal: Negative. Negative for arthralgias, back pain, myalgias, neck pain and neck stiffness. Skin: Negative. Negative for color change and rash. Neurological: Negative. Negative for dizziness, syncope, facial asymmetry, speech difficulty, weakness, light-headedness, numbness and headaches. Hematological: Negative. Does not bruise/bleed easily. Psychiatric/Behavioral: Negative. Negative for confusion and self-injury. The patient is not nervous/anxious. Physical Exam  
Physical Exam  
Constitutional: She is oriented to person, place, and time. Obese AAF on 6LNC, mild resp distress, sitting upright in stretcher HENT:  
Head: Normocephalic and atraumatic. Mouth/Throat: Oropharynx is clear and moist. No oropharyngeal exudate. Eyes: Conjunctivae and EOM are normal. Pupils are equal, round, and reactive to light. Neck: Normal range of motion. Cardiovascular: Normal rate, regular rhythm and normal heart sounds. Exam reveals no gallop and no friction rub. No murmur heard. Pulmonary/Chest: She is in respiratory distress. She has wheezes. She has no rales. She exhibits no tenderness. tachypneic in 30's No accessory muscle use Rhonchi and wheezing at bases On 6L NC Abdominal: Soft. Bowel sounds are normal. She exhibits no distension and no mass. There is no tenderness. There is no rebound and no guarding. Musculoskeletal: Normal range of motion. She exhibits edema. She exhibits no tenderness or deformity. Neurological: She is alert and oriented to person, place, and time. She displays normal reflexes. No cranial nerve deficit. She exhibits normal muscle tone. Coordination normal.  
Skin: Skin is warm. No rash noted. Psychiatric: She has a normal mood and affect. Nursing note and vitals reviewed. Diagnostic Study Results Labs - Recent Results (from the past 12 hour(s)) EKG, 12 LEAD, INITIAL  Collection Time: 07/08/18 10:57 PM  
Result Value Ref Range Ventricular Rate 82 BPM  
 Atrial Rate 82 BPM  
 P-R Interval 184 ms QRS Duration 80 ms  
 Q-T Interval 366 ms  
 QTC Calculation (Bezet) 427 ms Calculated P Axis 37 degrees Calculated R Axis 50 degrees Calculated T Axis 39 degrees Diagnosis Normal sinus rhythm Low voltage QRS When compared with ECG of 25-APR-2018 17:49, No significant change was found CK W/ CKMB & INDEX Collection Time: 07/09/18 12:06 AM  
Result Value Ref Range CK 42 26 - 192 U/L  
 CK - MB <1.0 <3.6 NG/ML  
 CK-MB Index Cannot be calculated 0 - 2.5 METABOLIC PANEL, COMPREHENSIVE Collection Time: 07/09/18 12:06 AM  
Result Value Ref Range Sodium 138 136 - 145 mmol/L Potassium 4.4 3.5 - 5.1 mmol/L Chloride 100 97 - 108 mmol/L  
 CO2 34 (H) 21 - 32 mmol/L Anion gap 4 (L) 5 - 15 mmol/L Glucose 140 (H) 65 - 100 mg/dL BUN 15 6 - 20 MG/DL Creatinine 0.82 0.55 - 1.02 MG/DL  
 BUN/Creatinine ratio 18 12 - 20 GFR est AA >60 >60 ml/min/1.73m2 GFR est non-AA >60 >60 ml/min/1.73m2 Calcium 10.7 (H) 8.5 - 10.1 MG/DL Bilirubin, total 0.3 0.2 - 1.0 MG/DL  
 ALT (SGPT) 23 12 - 78 U/L  
 AST (SGOT) 20 15 - 37 U/L Alk. phosphatase 96 45 - 117 U/L Protein, total 8.7 (H) 6.4 - 8.2 g/dL Albumin 3.2 (L) 3.5 - 5.0 g/dL Globulin 5.5 (H) 2.0 - 4.0 g/dL A-G Ratio 0.6 (L) 1.1 - 2.2 NT-PRO BNP Collection Time: 07/09/18 12:06 AM  
Result Value Ref Range NT pro- (H) 0 - 125 PG/ML  
TROPONIN I Collection Time: 07/09/18 12:06 AM  
Result Value Ref Range Troponin-I, Qt. <0.05 <0.05 ng/mL TSH 3RD GENERATION Collection Time: 07/09/18 12:06 AM  
Result Value Ref Range TSH 2.87 0.36 - 3.74 uIU/mL BLOOD GAS, ARTERIAL Collection Time: 07/09/18 12:13 AM  
Result Value Ref Range pH 7.30 (L) 7.35 - 7.45    
 PCO2 75 (H) 35.0 - 45.0 mmHg PO2 75 (L) 80 - 100 mmHg O2 SAT 93 92 - 97 %  BICARBONATE 36 (H) 22 - 26 mmol/L  
 BASE EXCESS 6.6 mmol/L  
 O2 METHOD NASAL O2    
 O2 FLOW RATE 5.00 L/min SPONTANEOUS RATE 24.0 Sample source ARTERIAL    
 SITE RIGHT RADIAL PARUL'S TEST YES Critical value read back MD ALANA   
BLOOD GAS, ARTERIAL Collection Time: 07/09/18  3:12 AM  
Result Value Ref Range pH 7.35 7.35 - 7.45    
 PCO2 69 (H) 35.0 - 45.0 mmHg PO2 171 (H) 80 - 100 mmHg O2 SAT 99 (H) 92 - 97 % BICARBONATE 38 (H) 22 - 26 mmol/L  
 BASE EXCESS 9.2 mmol/L  
 O2 METHOD BIPAP    
 FIO2 50 % SET RATE 4    
 SPONTANEOUS RATE 29.0 IPAP/PIP 20.0 EPAP/CPAP/PEEP 10.0 Sample source ARTERIAL    
 SITE LEFT BRACHIAL PARUL'S TEST N/A Critical value read back NORMA MEDRANO   
PROTHROMBIN TIME + INR Collection Time: 07/09/18  3:15 AM  
Result Value Ref Range INR 1.0 0.9 - 1.1 Prothrombin time 10.2 9.0 - 11.1 sec PTT Collection Time: 07/09/18  3:15 AM  
Result Value Ref Range aPTT 22.7 22.1 - 32.0 sec  
 aPTT, therapeutic range     58.0 - 77.0 SECS Radiologic Studies - CXR Results  (Last 48 hours) 07/08/18 2319  XR CHEST PORT Final result Impression:  IMPRESSION: No acute findings. Narrative:  EXAM:  XR CHEST PORT INDICATION:  SOB  
   
COMPARISON:  April 25, 2018 FINDINGS: A portable AP radiograph of the chest was obtained at 2316 hours. The  
patient is on a cardiac monitor. The lungs are hyperinflated. The lung bases  
are obscured due to overlying soft tissue. The cardiac and mediastinal contours  
and pulmonary vascularity are normal.  The bones and soft tissues are grossly  
within normal limits. Medical Decision Making I am the first provider for this patient. I reviewed the vital signs, available nursing notes, past medical history, past surgical history, family history and social history. Vital Signs-Reviewed the patient's vital signs.  
Patient Vitals for the past 12 hrs: 
 Temp Pulse BP SpO2  
07/09/18 0505 - - - 100 % 07/09/18 0230 - - (!) 136/94 99 % 07/09/18 0200 - - 129/85 96 % 07/09/18 0129 - - - 98 % 07/09/18 0100 - - 138/85 -  
07/09/18 0030 - - 124/80 95 % 07/08/18 2330 - - 126/76 96 % 07/08/18 2302 98.4 °F (36.9 °C) 85 143/85 95 % Pulse Oximetry Analysis - 92% on baseline 6L NC Cardiac Monitor:  
Rate: 94 bpm 
Rhythm: Normal Sinus Rhythm Records Reviewed: Nursing Notes, Old Medical Records, Previous electrocardiograms, Ambulance Run Sheet, Previous Radiology Studies and Previous Laboratory Studies Provider Notes (Medical Decision Making): DDx: CHF exacerbation, ACS, bronchitis, PNA, COPD exacerbation, respiratory failure 57 y.o. Female hx of COPD / asthma, presents to the ED with worsening SOB and hypoxia over the last few days. Will check EKG, labs, ABG, place on Bipap as needed. Will reassess. ED Course:  
Initial assessment performed. The patients presenting problems have been discussed, and they are in agreement with the care plan formulated and outlined with them. I have encouraged them to ask questions as they arise throughout their visit. EKG interpretation: (Preliminary) 2308 Rhythm: normal sinus rhythm. Rate (approx.): 82bpm; Axis: normal; Normal UT, QRS, QTc intervals; ST/T wave: normal; Other findings: non-ischemic. Written by SHRUTHI Hicks, as dictated by Saturnino Lovell MD 
 
11:46 PM 
Spent 10 minutes discussing the risks of smoking and the benefits of smoking cessation as well as the long term sequelae of smoking with the pt who verbalized her understanding. Reviewed strategies for success, including gradually decreasing the number of cigarettes smoked a day. Written by SHRUTHI Hicks, as dictated by Saturnino Lovell MD 
 
PROGRESS NOTE: 
12:43 AM 
Pt placed on Bipap with PCO2 75. Will continue to monitor. Pt states she feels better on BiPAP.  
Written by SHRUTHI Hicks, as dictated by Oniel Rucker MD 
 
1:15AM 
Pt received duoneb treatment and IV steroids; continue to have diminished sounds at bases but wheezing improved; will recheck ABG while on BiPAP PROGRESS NOTE: 
3:31 AM 
Pt reevaluated. Pt states she is feeling much better on Bipap. Will continue to monitor. Written by Gavino Valentin ED Scribe, as dictated by Oniel Rucekr MD 
 
PROGRESS NOTE: 
4:42 AM 
Pt reevaluated. Pt took her bipap mask off to eat. Will continue to monitor. Written by Gavino Valentin ED Scribe, as dictated by Oniel Rucker MD 
 
CONSULT NOTE:  
Vanesa Al MD spoke with Dr. Edie Mcbride, Specialty: Hospitalist 
Discussed pt's hx, disposition, and available diagnostic and imaging results. Reviewed care plans. Consultant will evaluate pt for admission. Written by Gavino Valentin ED Scribe, as dictated by Oniel Rucker MD 
 
CRITICAL CARE NOTE : 
 
6:15 AM 
 
IMPENDING DETERIORATION -Airway, Respiratory and Cardiovascular ASSOCIATED RISK FACTORS - Hypotension, Hypoxia and Metabolic changes MANAGEMENT- Bedside Assessment and Supervision of Care INTERPRETATION -  Xrays, Blood Gases, ECG, Blood Pressure and Cardiac Output Measures INTERVENTIONS - vent mngmt and Metobolic interventions, BIPAP and vent management for acute hypercapnic and hypoxic respiratory failure CASE REVIEW - Hospitalist, Nursing and Family TREATMENT RESPONSE -Stable PERFORMED BY - Self NOTES   : 
 
I have spent 55 minutes of critical care time involved in lab review, consultations with specialist, family decision- making, bedside attention and documentation. During this entire length of time I was immediately available to the patient . Critical Care:   The reason for providing this level of medical care for this critically ill patient was due to a critical illness that impaired one or more vital organ systems, such that there was a high probability of imminent or life threatening deterioration in the patient's condition. This care involved high complexity decision making to assess, manipulate, and support vital system functions, to treat this degree of vital organ system failure, and to prevent further life threatening deterioration of the patients condition. Abimael Manriquez MD 
 
Medications  
sodium chloride (NS) flush 5-10 mL (not administered)  
albuterol (PROVENTIL VENTOLIN) nebulizer solution 2.5 mg (not administered) [facility] TO SUBSTITUTE PER PROTOCOL (Reordered from: ammonium lactate (LAC-HYDRIN) 12 % topical cream) (not administered) aspirin chewable tablet 81 mg (81 mg Oral Given 7/9/18 0837) cetirizine (ZYRTEC) tablet 10 mg (not administered)  
fluticasone (FLONASE) 50 mcg/actuation nasal spray 2 Spray (not administered)  
furosemide (LASIX) tablet 40 mg (40 mg Oral Given 7/9/18 0837) hydrOXYzine pamoate (VISTARIL) capsule 50 mg (not administered)  
insulin glargine (LANTUS) injection 15 Units (not administered) [facility] TO SUBSTITUTE PER PROTOCOL (Reordered from: lovastatin (MEVACOR) 40 mg tablet) (not administered)  
metoprolol succinate (TOPROL-XL) XL tablet 25 mg (25 mg Oral Given 7/9/18 0837)  
nitroglycerin (NITROSTAT) tablet 0.4 mg (not administered)  
sodium chloride (NS) flush 5-10 mL (not administered)  
sodium chloride (NS) flush 5-10 mL (not administered)  
acetaminophen (TYLENOL) tablet 650 mg (not administered)  
ondansetron (ZOFRAN) injection 4 mg (not administered) docusate sodium (COLACE) capsule 100 mg (not administered)  
heparin (porcine) injection 5,000 Units (5,000 Units SubCUTAneous Given 7/9/18 0839) methylPREDNISolone (PF) (SOLU-MEDROL) injection 60 mg (not administered)  
albuterol-ipratropium (DUO-NEB) 2.5 MG-0.5 MG/3 ML (not administered) azithromycin (ZITHROMAX) 500 mg in 0.9% sodium chloride 250 mL IVPB (not administered)  
insulin lispro (HUMALOG) injection (not administered) glucose chewable tablet 16 g (not administered) dextrose (D50W) injection syrg 12.5-25 g (not administered) glucagon (GLUCAGEN) injection 1 mg (not administered)  
albuterol-ipratropium (DUO-NEB) 2.5 MG-0.5 MG/3 ML (3 mL Nebulization Given 7/9/18 0137)  
albuterol-ipratropium (DUO-NEB) 2.5 MG-0.5 MG/3 ML (3 mL Nebulization Given 7/9/18 0610) methylPREDNISolone (PF) (SOLU-MEDROL) injection 125 mg (125 mg IntraVENous Given 7/9/18 0607) Diagnosis Clinical Impression: 1. Acute respiratory failure with hypoxia and hypercapnia (HCC) 2. Acute exacerbation of chronic obstructive pulmonary disease (COPD) ([facility] 3. History of medication noncompliance 4. Metabolic alkalosis 5. Obesity hypoventilation syndrome ([facility] 6. Morbid obesity with body mass index (BMI) greater than or equal to 70 in [facility]) PLAN: 
1. Admit to Hospitalist 
 
Disposition: 
 
ADMISSION NOTE: 
5:46 AM 
Patient is being admitted to the hospital by Dr. Luis F Ward. The results of their tests and reasons for their admission have been discussed with them and/or available family. They convey agreement and understanding for the need to be admitted and for their admission diagnosis. Written by SHRUTHI Koo, as dictated by Brionna Nicholas MD. Attestations: This note is prepared by Haroon Knight, acting as MD Brionna Felix MD : The scribe's documentation has been prepared under my direction and personally reviewed by me in its entirety. I confirm that the note above accurately reflects all work, treatment, procedures, and medical decision making performed by me. This note will not be viewable in [address]

## 2018-07-09 NOTE — ED NOTES
TRANSFER - OUT REPORT:    Verbal report given to [address] on Yariel Fabian  being transferred to PCU for routine progression of care       Report consisted of patients Situation, Background, Assessment and   Recommendations(SBAR). Information from the following report(s) SBAR, Kardex, Intake/Output and MAR was reviewed with the receiving nurse. Lines:   Peripheral IV 07/09/18 Left Arm (Active)   Site Assessment Clean, dry, & intact 7/9/2018  2:09 AM   Dressing Status Clean, dry, & intact 7/9/2018  2:09 AM   Dressing Type Non-adherent dressing 7/9/2018  2:09 AM   Hub Color/Line Status Pink;End cap changed; Flushed 7/9/2018  2:09 AM       Peripheral IV 07/09/18 Left;Upper Antecubital (Active)   Site Assessment Clean, dry, & intact 7/9/2018  2:11 AM   Phlebitis Assessment 0 7/9/2018  2:11 AM   Infiltration Assessment 0 7/9/2018  2:11 AM   Dressing Status Clean, dry, & intact 7/9/2018  2:11 AM   Dressing Type Tape;Transparent 7/9/2018  2:11 AM        Opportunity for questions and clarification was provided.       Patient transported with:   Monitor  O2 @ 6 liters  Tech

## 2018-07-09 NOTE — ED NOTES
Pt continues to move back and forth from bed to chair that is in her room - pt disconnected BiPaP, RT unable to perform ABG at this time.

## 2018-07-10 ENCOUNTER — APPOINTMENT (OUTPATIENT)
Dept: ULTRASOUND IMAGING | Age: 48
DRG: 189 | End: 2018-07-10
Attending: INTERNAL MEDICINE
Payer: MEDICARE

## 2018-07-10 LAB
ANION GAP SERPL CALC-SCNC: 6 MMOL/L (ref 5–15)
BASOPHILS # BLD: 0 K/UL (ref 0–0.1)
BASOPHILS NFR BLD: 0 % (ref 0–1)
BUN SERPL-MCNC: 15 MG/DL (ref 6–20)
BUN/CREAT SERPL: 16 (ref 12–20)
CALCIUM SERPL-MCNC: 10.4 MG/DL (ref 8.5–10.1)
CHLORIDE SERPL-SCNC: 97 MMOL/L (ref 97–108)
CO2 SERPL-SCNC: 32 MMOL/L (ref 21–32)
CREAT SERPL-MCNC: 0.93 MG/DL (ref 0.55–1.02)
DIFFERENTIAL METHOD BLD: ABNORMAL
EOSINOPHIL # BLD: 0 K/UL (ref 0–0.4)
EOSINOPHIL NFR BLD: 0 % (ref 0–7)
ERYTHROCYTE [DISTWIDTH] IN BLOOD BY AUTOMATED COUNT: 12.8 % (ref 11.5–14.5)
GLUCOSE BLD STRIP.AUTO-MCNC: 291 MG/DL (ref 65–100)
GLUCOSE BLD STRIP.AUTO-MCNC: 315 MG/DL (ref 65–100)
GLUCOSE BLD STRIP.AUTO-MCNC: 384 MG/DL (ref 65–100)
GLUCOSE BLD STRIP.AUTO-MCNC: 435 MG/DL (ref 65–100)
GLUCOSE SERPL-MCNC: 298 MG/DL (ref 65–100)
HCT VFR BLD AUTO: 34.9 % (ref 35–47)
HGB BLD-MCNC: 10.8 G/DL (ref 11.5–16)
IMM GRANULOCYTES # BLD: 0.1 K/UL (ref 0–0.04)
IMM GRANULOCYTES NFR BLD AUTO: 1 % (ref 0–0.5)
LYMPHOCYTES # BLD: 0.5 K/UL (ref 0.8–3.5)
LYMPHOCYTES NFR BLD: 4 % (ref 12–49)
MCH RBC QN AUTO: 29.1 PG (ref 26–34)
MCHC RBC AUTO-ENTMCNC: 30.9 G/DL (ref 30–36.5)
MCV RBC AUTO: 94.1 FL (ref 80–99)
MONOCYTES # BLD: 0.4 K/UL (ref 0–1)
MONOCYTES NFR BLD: 3 % (ref 5–13)
NEUTS SEG # BLD: 12.1 K/UL (ref 1.8–8)
NEUTS SEG NFR BLD: 92 % (ref 32–75)
NRBC # BLD: 0 K/UL (ref 0–0.01)
NRBC BLD-RTO: 0 PER 100 WBC
PLATELET # BLD AUTO: 147 K/UL (ref 150–400)
PMV BLD AUTO: 11.2 FL (ref 8.9–12.9)
POTASSIUM SERPL-SCNC: 4.3 MMOL/L (ref 3.5–5.1)
RBC # BLD AUTO: 3.71 M/UL (ref 3.8–5.2)
RBC MORPH BLD: ABNORMAL
SERVICE CMNT-IMP: ABNORMAL
SODIUM SERPL-SCNC: 135 MMOL/L (ref 136–145)
WBC # BLD AUTO: 13.1 K/UL (ref 3.6–11)

## 2018-07-10 PROCEDURE — 74011000250 HC RX REV CODE- 250: Performed by: INTERNAL MEDICINE

## 2018-07-10 PROCEDURE — 82962 GLUCOSE BLOOD TEST: CPT

## 2018-07-10 PROCEDURE — 74011250637 HC RX REV CODE- 250/637: Performed by: HOSPITALIST

## 2018-07-10 PROCEDURE — 74011250636 HC RX REV CODE- 250/636: Performed by: INTERNAL MEDICINE

## 2018-07-10 PROCEDURE — 36415 COLL VENOUS BLD VENIPUNCTURE: CPT | Performed by: INTERNAL MEDICINE

## 2018-07-10 PROCEDURE — 85025 COMPLETE CBC W/AUTO DIFF WBC: CPT | Performed by: INTERNAL MEDICINE

## 2018-07-10 PROCEDURE — 74011250637 HC RX REV CODE- 250/637: Performed by: INTERNAL MEDICINE

## 2018-07-10 PROCEDURE — 74011636637 HC RX REV CODE- 636/637: Performed by: INTERNAL MEDICINE

## 2018-07-10 PROCEDURE — 74011250636 HC RX REV CODE- 250/636: Performed by: EMERGENCY MEDICINE

## 2018-07-10 PROCEDURE — 65660000000 HC RM CCU STEPDOWN

## 2018-07-10 PROCEDURE — 94660 CPAP INITIATION&MGMT: CPT

## 2018-07-10 PROCEDURE — 80048 BASIC METABOLIC PNL TOTAL CA: CPT | Performed by: INTERNAL MEDICINE

## 2018-07-10 PROCEDURE — 94640 AIRWAY INHALATION TREATMENT: CPT

## 2018-07-10 RX ORDER — ALBUTEROL SULFATE 90 UG/1
2 AEROSOL, METERED RESPIRATORY (INHALATION)
Status: ON HOLD | COMMUNITY
End: 2021-02-23 | Stop reason: SDUPTHER

## 2018-07-10 RX ORDER — INSULIN LISPRO 100 [IU]/ML
12 INJECTION, SOLUTION INTRAVENOUS; SUBCUTANEOUS ONCE
Status: COMPLETED | OUTPATIENT
Start: 2018-07-10 | End: 2018-07-10

## 2018-07-10 RX ORDER — KETOROLAC TROMETHAMINE 30 MG/ML
15 INJECTION, SOLUTION INTRAMUSCULAR; INTRAVENOUS
Status: COMPLETED | OUTPATIENT
Start: 2018-07-10 | End: 2018-07-10

## 2018-07-10 RX ORDER — IPRATROPIUM BROMIDE AND ALBUTEROL SULFATE 2.5; .5 MG/3ML; MG/3ML
3 SOLUTION RESPIRATORY (INHALATION)
Status: DISCONTINUED | OUTPATIENT
Start: 2018-07-10 | End: 2018-07-13 | Stop reason: HOSPADM

## 2018-07-10 RX ORDER — AMMONIUM LACTATE 12 G/100G
LOTION TOPICAL 2 TIMES DAILY
Status: DISCONTINUED | OUTPATIENT
Start: 2018-07-10 | End: 2018-07-11

## 2018-07-10 RX ADMIN — HUMAN INSULIN 12 UNITS: 100 INJECTION, SUSPENSION SUBCUTANEOUS at 23:18

## 2018-07-10 RX ADMIN — HEPARIN SODIUM 5000 UNITS: 5000 INJECTION, SOLUTION INTRAVENOUS; SUBCUTANEOUS at 17:22

## 2018-07-10 RX ADMIN — NYSTATIN: 100000 CREAM TOPICAL at 12:18

## 2018-07-10 RX ADMIN — FLUTICASONE PROPIONATE 2 SPRAY: 50 SPRAY, METERED NASAL at 09:24

## 2018-07-10 RX ADMIN — INSULIN LISPRO 12 UNITS: 100 INJECTION, SOLUTION INTRAVENOUS; SUBCUTANEOUS at 17:00

## 2018-07-10 RX ADMIN — FUROSEMIDE 40 MG: 40 TABLET ORAL at 09:09

## 2018-07-10 RX ADMIN — IPRATROPIUM BROMIDE AND ALBUTEROL SULFATE 3 ML: .5; 3 SOLUTION RESPIRATORY (INHALATION) at 08:08

## 2018-07-10 RX ADMIN — GUAIFENESIN 600 MG: 600 TABLET, EXTENDED RELEASE ORAL at 09:09

## 2018-07-10 RX ADMIN — Medication 10 ML: at 06:48

## 2018-07-10 RX ADMIN — GUAIFENESIN 600 MG: 600 TABLET, EXTENDED RELEASE ORAL at 23:17

## 2018-07-10 RX ADMIN — NYSTATIN: 100000 CREAM TOPICAL at 17:23

## 2018-07-10 RX ADMIN — INSULIN LISPRO 10 UNITS: 100 INJECTION, SOLUTION INTRAVENOUS; SUBCUTANEOUS at 12:16

## 2018-07-10 RX ADMIN — IPRATROPIUM BROMIDE AND ALBUTEROL SULFATE 3 ML: .5; 3 SOLUTION RESPIRATORY (INHALATION) at 15:03

## 2018-07-10 RX ADMIN — METHYLPREDNISOLONE SODIUM SUCCINATE 40 MG: 40 INJECTION, POWDER, FOR SOLUTION INTRAMUSCULAR; INTRAVENOUS at 14:24

## 2018-07-10 RX ADMIN — INSULIN LISPRO 5 UNITS: 100 INJECTION, SOLUTION INTRAVENOUS; SUBCUTANEOUS at 23:17

## 2018-07-10 RX ADMIN — PRAVASTATIN SODIUM 40 MG: 40 TABLET ORAL at 23:17

## 2018-07-10 RX ADMIN — CETIRIZINE HYDROCHLORIDE 10 MG: 10 TABLET, FILM COATED ORAL at 09:24

## 2018-07-10 RX ADMIN — AZITHROMYCIN MONOHYDRATE 500 MG: 500 INJECTION, POWDER, LYOPHILIZED, FOR SOLUTION INTRAVENOUS at 12:18

## 2018-07-10 RX ADMIN — Medication 10 ML: at 23:17

## 2018-07-10 RX ADMIN — METHYLPREDNISOLONE SODIUM SUCCINATE 40 MG: 40 INJECTION, POWDER, FOR SOLUTION INTRAMUSCULAR; INTRAVENOUS at 23:17

## 2018-07-10 RX ADMIN — SODIUM HYPOCHLORITE: 1.25 SOLUTION TOPICAL at 18:17

## 2018-07-10 RX ADMIN — METOPROLOL SUCCINATE 25 MG: 25 TABLET, EXTENDED RELEASE ORAL at 09:19

## 2018-07-10 RX ADMIN — Medication: at 18:16

## 2018-07-10 RX ADMIN — INSULIN LISPRO 7 UNITS: 100 INJECTION, SOLUTION INTRAVENOUS; SUBCUTANEOUS at 09:08

## 2018-07-10 RX ADMIN — Medication 10 ML: at 14:24

## 2018-07-10 RX ADMIN — CYCLOBENZAPRINE HYDROCHLORIDE 5 MG: 10 TABLET, FILM COATED ORAL at 09:09

## 2018-07-10 RX ADMIN — KETOROLAC TROMETHAMINE 15 MG: 30 INJECTION, SOLUTION INTRAMUSCULAR at 23:27

## 2018-07-10 RX ADMIN — METHYLPREDNISOLONE SODIUM SUCCINATE 40 MG: 40 INJECTION, POWDER, FOR SOLUTION INTRAMUSCULAR; INTRAVENOUS at 06:48

## 2018-07-10 RX ADMIN — CYCLOBENZAPRINE HYDROCHLORIDE 5 MG: 10 TABLET, FILM COATED ORAL at 17:22

## 2018-07-10 RX ADMIN — HEPARIN SODIUM 5000 UNITS: 5000 INJECTION, SOLUTION INTRAVENOUS; SUBCUTANEOUS at 09:11

## 2018-07-10 RX ADMIN — IPRATROPIUM BROMIDE AND ALBUTEROL SULFATE 3 ML: .5; 3 SOLUTION RESPIRATORY (INHALATION) at 19:21

## 2018-07-10 RX ADMIN — INSULIN GLARGINE 15 UNITS: 100 INJECTION, SOLUTION SUBCUTANEOUS at 09:08

## 2018-07-10 RX ADMIN — INSULIN HUMAN 7 UNITS: 100 INJECTION, SUSPENSION SUBCUTANEOUS at 12:16

## 2018-07-10 RX ADMIN — ASPIRIN 81 MG 81 MG: 81 TABLET ORAL at 09:09

## 2018-07-10 RX ADMIN — HEPARIN SODIUM 5000 UNITS: 5000 INJECTION, SOLUTION INTRAVENOUS; SUBCUTANEOUS at 23:17

## 2018-07-10 NOTE — PROGRESS NOTES
ADULT PROTOCOL: JET AEROSOL ASSESSMENT    Patient  Rosamaria Kingston     57 y.o.   female     7/9/2018  9:43 PM    Breath Sounds Pre Procedure: Right Breath Sounds: Diminished                               Left Breath Sounds: Diminished    Breath Sounds Post Procedure: Right Breath Sounds: Diminished                                 Left Breath Sounds: Diminished    Breathing pattern: Pre procedure Breathing Pattern: Regular          Post procedure Breathing Pattern: Regular    Heart Rate: Pre procedure Pulse: 92           Post procedure Pulse: 97    Resp Rate: Pre procedure Respirations: 24           Post procedure Respirations: 22      Cough: Pre procedure Cough: Non-productive               Post procedure Cough: Non-productive    Oxygen: O2 Device: BIPAP   FiO2 (%) 40     Changed: NO    SpO2: Pre procedure SpO2: 97 %   with oxygen              Post procedure SpO2: 91 %  with oxygen    Nebulizer Therapy: Current medications Aerosolized Medications: DuoNeb      Changed: NO    Smoking History:    Smoking status: Current Every Day Smoker       Packs/day: 0.25       Types: Cigarettes         Problem List:   Patient Active Problem List   Diagnosis Code    CHF (congestive heart failure) ([facility]) I50.9    Malignant essential hypertension I10    Asthma J45.909    Obesity hypoventilation syndrome ([facility]) E66.2    Dyspnea R06.00    Chest pressure R07.89    Acute on chronic diastolic heart failure ([facility]) I50.33    SVT (supraventricular tachycardia) ([facility]) I47.1    NSTEMI (non-ST elevated myocardial infarction) ([facility]) I21.4    S/P PTCA (percutaneous transluminal coronary angioplasty) Z98.61    Coronary atherosclerosis of native coronary artery I25.10    Hypoxia R09.02    Noncompliance with therapeutic plan Z91.11    Chest pain R07.9    GERD (gastroesophageal reflux disease) K21.9    Acute respiratory failure with hypoxia and hypercarbia ([facility]) J96.01, J96.02    COPD (chronic obstructive pulmonary disease) ([facility]) J44.9  Tobacco abuse Z72.0    Obesity, morbid (more than 100 lbs over ideal weight or BMI > 40) ([facility]) E66.01    Cellulitis L03.90    SIRS due to infectious process with acute organ dysfunction ([facility]) A41.9, R65.20    Sepsis associated hypotension ([facility]) A41.9    Gangrenous toe ([facility]) I96    Type II diabetes mellitus, uncontrolled ([facility]) E11.65    HTN (hypertension) A61    Diastolic CHF, chronic ([facility]) I50.32    Cough with hemoptysis R04.2    Lymphedema of both lower extremities I89.0    CAP (community acquired pneumonia) J18.9    Cellulitis, leg L03.119    COPD exacerbation ([facility]) J44.1    Maggot infestation B87.9    SOB (shortness of breath) R06.02    Shortness of breath R06.02    Multifocal pneumonia J18.9    Acute respiratory failure ([facility]) J96.00    Acute on chronic respiratory failure with hypoxia and hypercapnia ([facility]) J96.21, J96.22       Respiratory Therapist: Cory Bañuelos, RT

## 2018-07-10 NOTE — PROGRESS NOTES
[facility]  Pulmonary, Critical Care, and Sleep Medicine    Patient Consult    Name: Purnima Watson MRN: [id number]   : 1970 Hospital: [facility]   Date: 7/10/2018        IMPRESSION:   · Acute on Chronic Hypercarbic and Hypoxic Respiratory Failure, pt reports that she is usually on 6L per NC. She was followed by Dr. Nadya Soliman but has not seen him for a long time. She feels that she is a little better today. · Had acute on chronic worsening of dyspnea. · Wound cellulitis to her right breast. She is requesting would care eval.   · Super Morbid Obesity  · Peripheral Edema  · DM  · Hypertension  · Hyperlipidemia. · Hx of Heart failure, Diastolic Dysfunction. NO mention of Right sided heart dysfunction. · Mild Anemia  · ? ?GEN on home CPAP or bipap but has not been using it recently. RECOMMENDATIONS:   · Agree with current bipap use, Make sure pt uses the BIPAP at night and prn during day. · Pt and OT eval.   · On Nebs  · On lasix po  · ON Azithromycin IV  · Decreased dose of steroids  · Oxygen to keep POx over 90%  · Glycemic monitor and control especially while on steroids  · ON DVT prophylaxis dosing heparin. Subjective:     Last 24 hrs: NO chest pain, no back pain. She noted some area of induration of her right breast area. She reports she feels some epigastric discomfort. She is unable to lie her bed due pain and shortness of breath. No headaches. Has been tolerating po intake well. NO aspiration. Rest of 10 point ROS is negative for acute changes. Chief complaint: shortness of breath. Fatigue. This patient has been seen and evaluated at the request of Dr. Gwen Cleaning for above. Patient is a 57 y.o. female   Who presents for evaluation of above. Appears to have right sided heart dysfunction.   Has been occurring for the last  4 days. Tirso Bangura is on about 6 L of oxygen by nasal cannula at home and lately started having worsening of shortness of breath.  She reports shortness of breath is worsened with exertion with orthopnea but no PND. Kathlyn Bloch also reports chronic cough with increased quantities of phlegm mainly white or clear.  She is also having difficulty using her nebulizer machine at home. Kathlyn Bloch has not been using her bipap machine recently. Denies chest pain, palpitations or syncope.  Denies abdominal pain or diarrhea. Has been able to eat and drink well. Has not been sleeping well at night recently. On arrival to the hospital, she was tachypneic and had a blood gas which revealed a PCO2 of 75.  She received Solu-Medrol, DuoNeb and was started on BiPAP. Since being started on treatments she was feeling better. She was seen upstairs in her room. She was tolerating bipap well. She reports she was ready to eat her breakfast when seen this am.             Past Medical History:   Diagnosis Date    Arthritis     Asthma     CAD (coronary artery disease)     [facility]     COPD [facility]chronic obstructive pulmonary disease) [facility]     Diabetes ([facility]     Hypertension     Morbid obesity with BMI of 70 and over, adult ([facility]     NSTEMI (non-ST elevated myocardial infarction) ([facility]     SVT (supraventricular tachycardia) ([facility]       Past Surgical History:   Procedure Laterality Date    CARDIAC SURG PROCEDURE UNLIST      Stents    HX  SECTION      HX ORTHOPAEDIC      HX OTHER SURGICAL      cyst removed from back      Prior to Admission medications    Medication Sig Start Date End Date Taking? Authorizing Provider   cyclobenzaprine (FLEXERIL) 5 mg tablet Take 5 mg by mouth two (2) times a day. flexeriol   Yes Historical Provider   metoprolol succinate (TOPROL-XL) 25 mg XL tablet Take  by mouth daily. Landon Raza MD   furosemide (LASIX) 40 mg tablet Take 40 mg by mouth daily.  Indications: Peripheral Edema due to Chronic Heart Failure    Landon Raza MD   albuterol (PROVENTIL VENTOLIN) 2.5 mg /3 mL (0.083 %) nebulizer solution 3 mL by Nebulization route every four (4) hours as needed for Wheezing. 2/3/18   Dony Castellon MD   aspirin 81 mg chewable tablet Take 81 mg by mouth daily. Historical Provider   insulin glargine (LANTUS) 100 unit/mL injection 15 Units by SubCUTAneous route daily. Historical Provider   Nebulizer & Compressor machine 1 Each by Does Not Apply route daily as needed. 1/24/18   Flakita Zaidi MD   hydrOXYzine pamoate (VISTARIL) 50 mg capsule Take 50 mg by mouth every eight (8) hours as needed for Itching. Indications: Pruritus of Skin    Historical Provider   nystatin (MYCOSTATIN) topical cream Apply  to affected area two (2) times daily as needed for Skin Irritation or Itching. Historical Provider   fluticasone (FLONASE) 50 mcg/actuation nasal spray 2 Sprays by Both Nostrils route daily. 9/9/17   Nesha Gallardo MD   lovastatin (MEVACOR) 40 mg tablet Take 1 Tab by mouth nightly. 1/14/16   Jonel Poster, NP   glyBURIDE (DIABETA) 5 mg tablet Take 5 mg by mouth Daily (before breakfast). Historical Provider   cetirizine (ZYRTEC) 10 mg tablet Take 10 mg by mouth daily as needed for Allergies. Historical Provider   ketoconazole (NIZORAL) 2 % topical cream Apply  to affected area daily. Historical Provider   ammonium lactate (LAC-HYDRIN) 12 % topical cream rub in to affected area well 11/21/14   Burke Del Cid MD   nitroglycerin (NITROSTAT) 0.4 mg SL tablet 1 Tab by SubLINGual route every five (5) minutes as needed for Chest Pain (call 911 if not relieved by 3).  9/12/13   Jonel Poster, NP     No Known Allergies   Social History   Substance Use Topics    Smoking status: Current Every Day Smoker     Packs/day: 0.25     Types: Cigarettes    Smokeless tobacco: Never Used      Comment: Patient states \"I  aint smoking no more d*mn cigarettes after yesterday\" 01/26/2018    Alcohol use No      Family History   Problem Relation Age of Onset    Heart Disease Mother     Heart Disease Father     Heart Disease Sister         Current Facility-Administered Medications   Medication Dose Route Frequency    insulin NPH (NOVOLIN N, HUMULIN N) injection 7 Units  7 Units SubCUTAneous TID    albuterol-ipratropium (DUO-NEB) 2.5 MG-0.5 MG/3 ML  3 mL Nebulization Q6H RT    aspirin chewable tablet 81 mg  81 mg Oral DAILY    fluticasone (FLONASE) 50 mcg/actuation nasal spray 2 Spray  2 Spray Both Nostrils DAILY    furosemide (LASIX) tablet 40 mg  40 mg Oral DAILY    insulin glargine (LANTUS) injection 15 Units  15 Units SubCUTAneous DAILY    pravastatin (PRAVACHOL) tablet 40 mg  40 mg Oral QHS    sodium chloride (NS) flush 5-10 mL  5-10 mL IntraVENous Q8H    heparin (porcine) injection 5,000 Units  5,000 Units SubCUTAneous Q8H    insulin lispro (HUMALOG) injection   SubCUTAneous AC&HS    azithromycin (ZITHROMAX) 500 mg in 0.9% sodium chloride (MBP/ADV) 250 mL  500 mg IntraVENous Q24H    guaiFENesin ER (MUCINEX) tablet 600 mg  600 mg Oral Q12H    methylPREDNISolone (PF) (SOLU-MEDROL) injection 40 mg  40 mg IntraVENous Q8H    cyclobenzaprine (FLEXERIL) tablet 5 mg  5 mg Oral BID    nystatin (MYCOSTATIN) 100,000 unit/gram cream   Topical BID    metoprolol succinate (TOPROL-XL) XL tablet 25 mg  25 mg Oral DAILY    ammonium lactate (LAC-HYDRIN) 12 % lotion   Topical DAILY       Review of Systems:  A comprehensive review of systems was negative.     Objective:   Vital Signs:    Visit Vitals    /69 (BP 1 Location: Right arm, BP Patient Position: At rest)    Pulse 78    Temp 97.6 °F (36.4 °C)    Resp 18    Wt (!) 192.5 kg (424 lb 6.2 oz)    SpO2 94%    Breastfeeding No    BMI 68.5 kg/m2       O2 Device: Nasal cannula   O2 Flow Rate (L/min): 6 l/min   Temp (24hrs), Av.1 °F (36.7 °C), Min:97.6 °F (36.4 °C), Max:98.7 °F (37.1 °C)       Intake/Output:   Last shift:      07/10 07 - 07/10 1900  In: 240 [P.O.:240]  Out: -   Last 3 shifts: 1901 - 07/10 0700  In: 930 [P.O.:680; I.V.:250]  Out: 3100 [Urine:3100]    Intake/Output Summary (Last 24 hours) at 07/10/18 1030  Last data filed at 07/10/18 0800   Gross per 24 hour   Intake             1170 ml   Output             3100 ml   Net            -1930 ml      Physical Exam:   General:  Morbidly obese, female, Alert, cooperative, no distress, appears stated age. On NC oxygen eating breakfast. Conversant. Head:  Normocephalic, without obvious abnormality, atraumatic. Eyes:  Conjunctivae/corneas clear. PERRL, EOMs intact. Nose: Nares normal. Septum midline. Mucosa normal. No drainage or sinus tenderness. Throat: Lips, mucosa, and tongue normal. Teeth and gums normal.   Neck: Supple, symmetrical, trachea midline, no adenopathy, thyroid: no enlargment/tenderness/nodules, no carotid bruit and no JVD. Back:   Symmetric, no curvature. ROM normal.   Lungs:   Clear to auscultation bilaterally. Good air movement. Chest wall:  No tenderness or deformity. Heart:  Regular rate and rhythm, S1, S2 normal, no murmur, click, rub or gallop. Abdomen:   Obese, Soft, non-tender. Bowel sounds normal. No masses,  No organomegaly. Extremities: Extremities normal, atraumatic, no cyanosis, 3+ bilateral edema. Brawny edema. Pulses: 2+ and symmetric all extremities. Skin: Skin color, texture, turgor normal. No rashes or lesions   Lymph nodes: Cervical, supraclavicular, and axillary nodes normal.   Neurologic: Grossly nonfocal, has good strength of BUE and BLE. Psych: No anxiety or depression.       Data review:     Recent Results (from the past 24 hour(s))   GLUCOSE, POC    Collection Time: 07/09/18 10:39 AM   Result Value Ref Range    Glucose (POC) 245 (H) 65 - 100 mg/dL    Performed by Oneida Richards    TROPONIN I    Collection Time: 07/09/18  4:25 PM   Result Value Ref Range    Troponin-I, Qt. <0.05 <0.05 ng/mL   GLUCOSE, POC    Collection Time: 07/09/18  4:57 PM   Result Value Ref Range    Glucose (POC) 329 (H) 65 - 100 mg/dL    Performed by Jose G Duff GLUCOSE, POC    Collection Time: 07/09/18 10:16 PM   Result Value Ref Range    Glucose (POC) 326 (H) 65 - 100 mg/dL    Performed by [address], BASIC    Collection Time: 07/10/18  3:53 AM   Result Value Ref Range    Sodium 135 (L) 136 - 145 mmol/L    Potassium 4.3 3.5 - 5.1 mmol/L    Chloride 97 97 - 108 mmol/L    CO2 32 21 - 32 mmol/L    Anion gap 6 5 - 15 mmol/L    Glucose 298 (H) 65 - 100 mg/dL    BUN 15 6 - 20 MG/DL    Creatinine 0.93 0.55 - 1.02 MG/DL    BUN/Creatinine ratio 16 12 - 20      GFR est AA >60 >60 ml/min/1.73m2    GFR est non-AA >60 >60 ml/min/1.73m2    Calcium 10.4 (H) 8.5 - 10.1 MG/DL   CBC WITH AUTOMATED DIFF    Collection Time: 07/10/18  3:53 AM   Result Value Ref Range    WBC 13.1 (H) 3.6 - 11.0 K/uL    RBC 3.71 (L) 3.80 - 5.20 M/uL    HGB 10.8 (L) 11.5 - 16.0 g/dL    HCT 34.9 (L) 35.0 - 47.0 %    MCV 94.1 80.0 - 99.0 FL    MCH 29.1 26.0 - 34.0 PG    MCHC 30.9 30.0 - 36.5 g/dL    RDW 12.8 11.5 - 14.5 %    PLATELET 062 (L) 760 - 400 K/uL    MPV 11.2 8.9 - 12.9 FL    NRBC 0.0 0  WBC    ABSOLUTE NRBC 0.00 0.00 - 0.01 K/uL    NEUTROPHILS 92 (H) 32 - 75 %    LYMPHOCYTES 4 (L) 12 - 49 %    MONOCYTES 3 (L) 5 - 13 %    EOSINOPHILS 0 0 - 7 %    BASOPHILS 0 0 - 1 %    IMMATURE GRANULOCYTES 1 (H) 0.0 - 0.5 %    ABS. NEUTROPHILS 12.1 (H) 1.8 - 8.0 K/UL    ABS. LYMPHOCYTES 0.5 (L) 0.8 - 3.5 K/UL    ABS. MONOCYTES 0.4 0.0 - 1.0 K/UL    ABS. EOSINOPHILS 0.0 0.0 - 0.4 K/UL    ABS. BASOPHILS 0.0 0.0 - 0.1 K/UL    ABS. IMM.  GRANS. 0.1 (H) 0.00 - 0.04 K/UL    DF SMEAR SCANNED      RBC COMMENTS NORMOCYTIC, NORMOCHROMIC     GLUCOSE, POC    Collection Time: 07/10/18  8:42 AM   Result Value Ref Range    Glucose (POC) 291 (H) 65 - 100 mg/dL    Performed by Mike Leon      EKG: NSR       Imaging:  I have personally reviewed the patients radiographs and have reviewed the reports:  7-8-18: COMPARISON:  April 25, 2018     FINDINGS: A portable AP radiograph of the chest was obtained at 2316 hours. The  patient is on a cardiac monitor. The lungs are hyperinflated. The lung bases  are obscured due to overlying soft tissue. The cardiac and mediastinal contours  and pulmonary vascularity are normal.  The bones and soft tissues are grossly  within normal limits.      IMPRESSION: No acute findings.         Belkys Amato MD

## 2018-07-10 NOTE — PROGRESS NOTES
PCU SHIFT NURSING NOTE      Bedside and Verbal shift change report given to Dolly Briceño RN (oncoming nurse) by Parris Phillips RN (offgoing nurse). Report included the following information SBAR, Kardex, MAR and Recent Results. Shift Summary:   0700: Bedside and Verbal shift change report given to Keya Garrison RN (oncoming nurse) by Dolly Briceño RN (offgoing nurse). Report included the following information SBAR, Kardex, MAR and Recent Results. Admission Date 7/8/2018   Admission Diagnosis Acute respiratory failure ([facility]   Consults IP CONSULT TO PULMONOLOGY        Consults   []PT   []OT   []Speech   []Case Management      [] Palliative      Cardiac Monitoring Order   [x]Yes   []No     IV drips   []Yes    Drip:                            Dose:  Drip:                            Dose:  Drip:                            Dose:   [x]No     GI Prophylaxis   []Yes   []No         DVT Prophylaxis   SCDs:  Sequential Compression Device: Bilateral     Patient Refused VTE Prophylaxis: Yes    Haroon stockings:         [] Medication   []Contraindicated   []None      Activity Level Activity Level: Up with Assistance (due to equipment)     Activity Assistance: Partial (one person)   Purposeful Rounding every 1-2 hour? [x]Yes   Ogden Score  Total Score: 2   Bed Alarm (If score 3 or >)   []Yes   [] Refused (See signed refusal form in chart)   Jeffrey Score  Jeffrey Score: 20   Jeffrey Score (if score 14 or less)   []PMT consult   []Wound Care consult      []Specialty bed   [] Nutrition consult          Needs prior to discharge:   Home O2 required:    [x]Yes   []No    If yes, how much O2 required?  6L     Other:    Last Bowel Movement: Last Bowel Movement Date: 07/09/18      Influenza Vaccine Received Flu Vaccine for Current Season (usually Sept-March): Not Flu Season        Pneumonia Vaccine           Diet Active Orders   Diet    DIET CARDIAC Regular      LDAs               Peripheral IV 07/09/18 Left Arm (Active)   Site Assessment Clean, dry, & intact 7/10/2018  3:45 AM   Phlebitis Assessment 0 7/10/2018  3:45 AM   Infiltration Assessment 0 7/10/2018  3:45 AM   Dressing Status Clean, dry, & intact 7/10/2018  3:45 AM   Dressing Type Transparent 7/10/2018  3:45 AM   Hub Color/Line Status Pink 7/10/2018  3:45 AM                      Urinary Catheter      Intake & Output   Date 07/09/18 0700 - 07/10/18 0659 07/10/18 0700 - 07/11/18 0659   Shift 0700-1859 1900-0659 24 Hour Total 0700-1859 1900-0659 24 Hour Total   I  N  T  A  K  E   P. O. 680  680         P. O. 680  680       I.V.  (mL/kg/hr) 250  (0.1)  250         Volume (azithromycin (ZITHROMAX) 500 mg in 0.9% sodium chloride (MBP/ADV) 250 mL) 250  250       Shift Total  (mL/kg) 930  (4.7)  930  (4.7)      O  U  T  P  U  T   Urine  (mL/kg/hr) 1600  (0.7)  1600         Urine Voided 1600  1600         Urine Occurrence(s) 1 x  1 x       Stool            Stool Occurrence(s) 1 x  1 x       Shift Total  (mL/kg) 1600  (8)  1600  (8)      NET -670  -670      Weight (kg) 199.6 199.6 199.6 199.6 199.6 199.6         Readmission Risk Assessment Tool Score High Risk            27       Total Score        3 Has Seen PCP in Last 6 Months (Yes=3, No=0)    9 IP Visits Last 12 Months (1-3=4, 4=9, >4=11)    9 Pt. Coverage (Medicare=5 , Medicaid, or Self-Pay=4)    6 Charlson Comorbidity Score (Age + Comorbid Conditions)        Criteria that do not apply:    . Living with Significant Other. Assisted Living. LTAC. SNF.  or   Rehab    Patient Length of Stay (>5 days = 3)       Expected Length of Stay - - -   Actual Length of Stay 1

## 2018-07-10 NOTE — PROGRESS NOTES
Pharmacy Medication Reconciliation     The patient was interviewed regarding current PTA medication list, use and drug allergies. The patient was questioned regarding use of any other inhalers, topical products, over the counter medications, herbal medications, vitamin products or ophthalmic/nasal/otic medication use. Allergy Update: None    Recommendations/Findings: The following amendments were made to the patient's active medication list on file at [address]:   1) Additions:   Ventolin 90 mcg inhaler: 2 puffs every 6 hours as needed for wheezing     2) Deletions: None    3) Changes:   Furosemide 40 mg tablet: 40 mg tablet daily changed to 40 mg tablet twice a day   Metoprolol succinate 25 mg XL tablet: 1 tablet daily changed to 1 tablet twice a day       -Clarified PTA med list with patient. PTA medication list was corrected to the following:     Prior to Admission Medications   Prescriptions Last Dose Informant Patient Reported? Taking? albuterol (PROVENTIL VENTOLIN) 2.5 mg /3 mL (0.083 %) nebulizer solution   No Yes   Sig: 3 mL by Nebulization route every four (4) hours as needed for Wheezing. albuterol (VENTOLIN HFA) 90 mcg/actuation inhaler  Self Yes Yes   Sig: Take 2 Puffs by inhalation every six (6) hours as needed for Wheezing. ammonium lactate (LAC-HYDRIN) 12 % topical cream   No Yes   Sig: rub in to affected area well   aspirin 81 mg chewable tablet   Yes Yes   Sig: Take 81 mg by mouth daily. cetirizine (ZYRTEC) 10 mg tablet   Yes Yes   Sig: Take 10 mg by mouth daily as needed for Allergies. cyclobenzaprine (FLEXERIL) 5 mg tablet   Yes Yes   Sig: Take 5 mg by mouth two (2) times a day. flexeriol   fluticasone (FLONASE) 50 mcg/actuation nasal spray   No Yes   Si Sprays by Both Nostrils route daily. furosemide (LASIX) 40 mg tablet  Self Yes Yes   Sig: Take 40 mg by mouth two (2) times a day.  Indications: Peripheral Edema due to Chronic Heart Failure   glyBURIDE (DIABETA) 5 mg tablet   Yes Yes   Sig: Take 5 mg by mouth Daily (before breakfast). hydrOXYzine pamoate (VISTARIL) 50 mg capsule   Yes Yes   Sig: Take 50 mg by mouth every eight (8) hours as needed for Itching. Indications: Pruritus of Skin   insulin glargine (LANTUS) 100 unit/mL injection   Yes Yes   Sig: 15 Units by SubCUTAneous route daily. ketoconazole (NIZORAL) 2 % topical cream  Self Yes Yes   Sig: Apply  to affected area daily. lovastatin (MEVACOR) 40 mg tablet   No Yes   Sig: Take 1 Tab by mouth nightly. metoprolol succinate (TOPROL-XL) 25 mg XL tablet  Self Yes Yes   Sig: Take  by mouth two (2) times a day. nitroglycerin (NITROSTAT) 0.4 mg SL tablet   No Yes   Si Tab by SubLINGual route every five (5) minutes as needed for Chest Pain (call 911 if not relieved by 3). nystatin (MYCOSTATIN) topical cream   Yes Yes   Sig: Apply  to affected area two (2) times daily as needed for Skin Irritation or Itching.       Facility-Administered Medications: None          Thank you,  Maegan Ortiz, PharmD candidate [id number]

## 2018-07-10 NOTE — PROGRESS NOTES
0800 : Report received from [facility]. SBAR, Kardex, Intake/Output, MAR and Recent Results were discussed. Thony Hand, RN     PCU SHIFT NURSING NOTE      Bedside shift change report given to  Kenn Siddiqui (oncoming nurse) by Gaviota Rodriguez (offgoing nurse). Report included the following information SBAR, Kardex, Intake/Output, MAR and Recent Results. Shift Summary: 0830 : Spoke with wound care, will see patient today. 1030 : Dr. Sarika Herrmann rounding on patient, notified that patient stated she takes 40mg lasix twice daily PTA. Orders to consult pharmacy to complete med rec. Also notified of right breast mass, hard, non moving with puckering and nipple drainage. Patient reports that she has not had a breast exam in \"at least 3 years\". No new orders. 1150 : Glucose 435, Dr. Sarika Herrmann notified with no new orders. 1332 : Spoke with US, per US unable to complete US until mamogram is done. Mamograms are not done while in patient and patient has no way to follow up outpatient. Recommended consult surgeon who can assess and perform biopsy at beside. Paged Dr. Sarika Herrmann to notify. Also at this time NP completing interdisciplinary rounds, patient adamant that she only comes [facility] because of the private room. She has no way to transport herself to any appt or rehab. She stated that she had \"cussed\" out past PT that had come to her home, citing \"they told lies on me\". She refused any explanation of inpatient rehab, pulmonary rehab vs sheltering arms. Patient does not like to be \"around other people, I don't get along with other people\". 1345 : Updated Dr. Sarika Herrmann on US recommendations. MD will place order for consult.     Admission Date 7/8/2018   Admission Diagnosis Acute respiratory failure ([address]   Consults IP CONSULT TO PULMONOLOGY        Consults   [x]PT   [x]OT   []Speech   [x]Case Management      [] Palliative      Cardiac Monitoring Order   [x]Yes   []No     IV drips   []Yes    Drip: Dose:  Drip:                            Dose:  Drip:                            Dose:   [x]No     GI Prophylaxis   [x]Yes   []No         DVT Prophylaxis   SCDs:  Sequential Compression Device: Bilateral     Patient Refused VTE Prophylaxis: Yes    Haroon stockings:         [x] Medication   []Contraindicated   []None      Activity Level Activity Level: Up with Assistance (due to equipment)     Activity Assistance: Partial (one person)   Purposeful Rounding every 1-2 hour? [x]Yes   Ogden Score  Total Score: 2   Bed Alarm (If score 3 or >)   []Yes   [] Refused (See signed refusal form in chart)   Jeffrey Score  Jeffrey Score: 20   Jeffrey Score (if score 14 or less)   []PMT consult   [x]Wound Care consult      [x]Specialty bed   [] Nutrition consult          Needs prior to discharge:   Home O2 required:    []Yes   []No    If yes, how much O2 required? Other:    Last Bowel Movement: Last Bowel Movement Date: 07/09/18      Influenza Vaccine Received Flu Vaccine for Current Season (usually Sept-March): Not Flu Season        Pneumonia Vaccine           Diet Active Orders   Diet    DIET CARDIAC Regular      LDAs               Peripheral IV 07/09/18 Left Arm (Active)   Site Assessment Clean, dry, & intact 7/10/2018  3:45 AM   Phlebitis Assessment 0 7/10/2018  3:45 AM   Infiltration Assessment 0 7/10/2018  3:45 AM   Dressing Status Clean, dry, & intact 7/10/2018  3:45 AM   Dressing Type Transparent 7/10/2018  3:45 AM   Hub Color/Line Status Pink 7/10/2018  3:45 AM                      Urinary Catheter      Intake & Output   Date 07/09/18 0700 - 07/10/18 0659 07/10/18 0700 - 07/11/18 0659   Shift 0700-1859 1900-0659 24 Hour Total 0700-1859 1900-0659 24 Hour Total   I  N  T  A  K  E   P. O. 680  680         P. O. 680  680       I.V.  (mL/kg/hr) 250  (0.1)  250  (0.1)         Volume (azithromycin (ZITHROMAX) 500 mg in 0.9% sodium chloride (MBP/ADV) 250 mL) 250  250       Shift Total  (mL/kg) 930  (4.7)  930  (4.8) O  U  T  P  U  T   Urine  (mL/kg/hr) 1600  (0.7) 700  (0.3) 2300  (0.5) 800  800      Urine Voided 6511 422 1661 800  800      Urine Occurrence(s) 1 x  1 x       Stool            Stool Occurrence(s) 1 x  1 x       Shift Total  (mL/kg) 1600  (8) 700  (3.6) 2300  (11.9) 800  (4.2)  800  (4.2)   NET -670 -700 -1370 -800  -800   Weight (kg) 199.6 192.5 192.5 192.5 192.5 192.5         Readmission Risk Assessment Tool Score High Risk            27       Total Score        3 Has Seen PCP in Last 6 Months (Yes=3, No=0)    9 IP Visits Last 12 Months (1-3=4, 4=9, >4=11)    9 Pt. Coverage (Medicare=5 , Medicaid, or Self-Pay=4)    6 Charlson Comorbidity Score (Age + Comorbid Conditions)        Criteria that do not apply:    . Living with Significant Other. Assisted Living. LTAC. SNF.  or   Rehab    Patient Length of Stay (>5 days = 3)       Expected Length of Stay - - -   Actual Length of Stay 1

## 2018-07-10 NOTE — CDMP QUERY
Dr. Verna BURNETTE :  Please clarify if this patient is (was) being treated/managed for:     => Morbid (severe) obesity with alveolar hypoventilation    Other Explanation of clinical findings  Clinically Undetermined (no explanation for clinical findings)    The medical record reflects the following risk factors, clinical indicators, and treatment    Risk Factors: 56 yo female admitted w/ Acute  on chronic hypercarbic Resp. Failure h/o COPD/Asthma/ GEN/ CAD/CHF/Smoker home O2 at 6L NC  Clinical Indicators: O2 Sats: 95% on 5L NC. .100% on BiPAP. .. BMI: 71.02. Thony Chapman pro-BNP: 233. Thony Chapman Noted in the chart:  She is in respiratory distress. She has wheezes. ..tachypneic in 30's. Thony Chapman Rhonchi and wheezing at bases    Treatment: Duo-Neb Tx 3ml x 2 / Lasix 40 mg po/ Solu-Medrol 125 mg IV/     REFERENCE:  Obesity hypoventilation syndrome (OHS) is a condition in which severely overweight people fail to breathe rapidly enough or deeply enough, resulting in low blood oxygen levels and high blood carbon dioxide levels. OHS is defined as a combination of obesity, BMI > or equal to 30kg/m2 with awake chronic hypercapnea (PaC02 >45mm Hg) and sleep disordered breathing. Approximately 90% of patients with OHS also have obstructive sleep apnea. Please clarify and document your clinical opinion in the progress notes and discharge summary including the definitive and/or presumptive diagnosis, (suspected or probable), related to the above clinical findings. Please include clinical findings supporting your diagnosis.     Thank Boone Bowman  [phone]

## 2018-07-10 NOTE — PROGRESS NOTES
4:16PM  Initial Assessment:  CM met with pt to complete assessment and discuss d/c planning. Pt is a 56 yo female admitted for acute respiratory failure. At baseline, pt requires assistance with ADLs and IADLs. Pt lives alone in a 1 story home. She names her son, nephew, and cousin as supports. Pt has a Medicaid aide through [employer] for daytime hours and some evening respite. Pt owns all appropriate DME. She has a hospital bed that she indicates is \"too high\" and she needs a different mattress. CM to contact PCP's office as they are the ones who ordered the bed. Pt gets O2 through [address], using 6L at home. She has a 10L concentrator. Case Management department provided pt with nebulizer when she was at [facility] in January 2018. Pt states that it does not give her any relief and requests a different kind. CM discussed inability to provide her with another nebulizer. Pt states that she gets wound care through [facility]. She refuses any HH services other than wound care. She states that she has had bad experiences with multiple  [address] and will not agree to home PT again. Pt also refusing any kind of IP rehab/SNF. Pt refusing to work with PT in the hospital. Pt's PCP is home based. She states that she last had a visit a month ago. Pt uses [employer] for transportation but struggles to leave the home due to the amount of O2 she requires. Pt would like to do OP therapy at [facility] but states that she does not have a reliable way to get there as Sinda Medico is not always return in a timely manner to pick her up. Pt also does not like to leave the house because of how much O2 she requires despite stating that she has an aide who could accompany her to appts and assist her. CM will continue to follow and assist with d/c planning. Care Management Interventions  PCP Verified by CM:  Yes Alana Valdez NP )  Mode of Transport at Discharge: BLS  Transition of Care Consult (CM Consult): Discharge Planning  Discharge Durable Medical Equipment: No  Physical Therapy Consult: Yes  Occupational Therapy Consult: Yes  Speech Therapy Consult: No  Current Support Network: Own Home, Lives Alone  Confirm Follow Up Transport: Family  Plan discussed with Pt/Family/Caregiver: Yes  Discharge Location  Discharge Placement: Home with home health      ROSCOE Linda  Care Manager          Reason for Admission:  Acute respiratory failure                RRAT Score:    27              Resources/supports as identified by patient/family:                   Top Challenges facing patient (as identified by patient/family and CM): Finances/Medication cost?                    Transportation? Support system or lack thereof? Living arrangements? Self-care/ADLs/Cognition?             Current Advanced Directive/Advance Care Plan:                            Plan for utilizing home health:                          Likelihood of readmission:                  Transition of Care Plan:

## 2018-07-10 NOTE — DIABETES MGMT
DTC Progress Note Recommendations/ Comments: Pt with BG > 400 mg/dL today. Noted pt on solu-medrol 40 mg q 8 hrs and NPH started at 7 units 3 times daily. Please consider: 1. Adding CHO consistent to current diet 2. Timing and linking NPH dose with solu-medrol dose to better address BG spikes from steroids, may consider increasing NPH to 12 units timed and linked with solu-medrol dose. Chart reviewed on Mayi Srivastava. Patient is a 57 y.o. female with known Type 2 Diabetes on glyburide 5mg ac b and lantus 15 units at home. A1c:  
Lab Results Component Value Date/Time Hemoglobin A1c 5.3 07/25/2017 05:30 AM  
 Hemoglobin A1c 7.1 (H) 10/28/2016 04:25 AM  
 
 
Recent Glucose Results:  
Lab Results Component Value Date/Time  (H) 07/10/2018 03:53 AM  
 GLUCPOC 435 (H) 07/10/2018 11:22 AM  
 GLUCPOC 291 (H) 07/10/2018 08:42 AM  
 GLUCPOC 326 (H) 07/09/2018 10:16 PM  
  
 
Lab Results Component Value Date/Time Creatinine 0.93 07/10/2018 03:53 AM  
 
CrCl cannot be calculated (Unknown ideal weight. ). Active Orders Diet DIET CARDIAC Regular PO intake:  
Patient Vitals for the past 72 hrs: 
 % Diet Eaten  
07/10/18 0800 100 % 07/09/18 1800 100 % Current hospital DM medication: humalog correction and glyburide 5mg ac b Will continue to follow as needed. Thank you Padma M[facility]dez [facility]

## 2018-07-10 NOTE — PROGRESS NOTES
Chart reviewed and orders acknowledged. Spoke to patients nurse who cleared her for PT eval and to let PT know she will only walk in the room and not the [facility]son. Saw patient in room who adamantly refused any PT while in the hospital.  PT will not see this patient per her request. Please reorder PT services if she changes her mind. Thank you.     Noy White, PT

## 2018-07-10 NOTE — PROGRESS NOTES
Hospitalist Progress Note NAME: Anirudh Lange :  1970 MRN:  [id number] Assessment / Plan: 
Acute on chronic hypercarbic respiratory failure due to acute COPD exacerbation Obstructive sleep apnea Cont  Solu-Medrol, DuoNeb and azithromycin.  on Mucinex. Continue home inhalers.  Continue home CPAP. Appreciate pulmonary help Cont nasal cannula and off bipap Diastolic congestive heart failure compensated Cont Lasix, metoprolol Rt breast mass Check us of rt breast 
 
Diabetes mellitus Hypertension Dyslipidemia Hold home glipizide and start insulin sliding scale and resume home lantus Add nph timed with steriods Resume metoprolol Resume statin Morbid obesity Chronic dermatitis with keratosis of skin Consult wound care 
  
 
 
Body mass index is 68.5 kg/(m^2). Code status: Full Prophylaxis: Hep SQ Recommended Disposition: Home w/Family Subjective: Chief Complaint / Reason for Physician Visit Feeling better. Off bipap. Reports rt breast mass Review of Systems: 
Symptom Y/N Comments  Symptom Y/N Comments Fever/Chills n   Chest Pain n   
Poor Appetite    Edema Cough y   Abdominal Pain Sputum    Joint Pain SOB/RAZO y   Pruritis/Rash Nausea/vomit    Tolerating PT/OT Diarrhea    Tolerating Diet Constipation    Other Could NOT obtain due to:   
 
Objective: VITALS:  
Last 24hrs VS reviewed since prior progress note. Most recent are: 
Patient Vitals for the past 24 hrs: 
 Temp Pulse Resp BP SpO2  
07/10/18 1031 97.6 °F (36.4 °C) 97 18 128/74 -  
07/10/18 0812 - - - - 94 % 07/10/18 0810 97.6 °F (36.4 °C) 78 18 134/69 94 % 07/10/18 0809 - - - - 94 % 07/10/18 0347 98 °F (36.7 °C) 79 18 112/53 97 % 07/10/18 0340 - - - - 92 % 18 2359 - - - - 91 % 18 2304 98.3 °F (36.8 °C) 90 20 122/68 93 % 18 -  - 112/58 -  
18 98.2 °F (36.8 °C) 93 20 114/53 94 % 18 1948 - 94 - 102/41 94 % 07/09/18 1654 - - - - 92 % 07/09/18 1603 98 °F (36.7 °C) 92 20 100/50 91 % 07/09/18 1316 - - - - 93 % Intake/Output Summary (Last 24 hours) at 07/10/18 1254 Last data filed at 07/10/18 0800 Gross per 24 hour Intake              930 ml Output             2700 ml Net            -1770 ml PHYSICAL EXAM: 
General: cooperative, no acute distress, morbid obesity EENT:  EOMI. Anicteric sclerae. MMM Resp:  Poor air entry, wheezing +, no crackles CV:  Regular  rhythm,  No edema GI:  Soft, Non distended, Non tender.  +Bowel sounds Neurologic:  Alert and oriented X 3, normal speech, Psych:   Not anxious nor agitated Skin:  Chronic dermatitis and keratosis of skin Reviewed most current lab test results and cultures  YES Reviewed most current radiology test results   YES Review and summation of old records today    NO Reviewed patient's current orders and MAR    YES 
PMH/SH reviewed - no change compared to H&P Current Facility-Administered Medications:  
  insulin NPH (NOVOLIN N, HUMULIN N) injection 7 Units, 7 Units, SubCUTAneous, TID, Jeyson Dempsey MD, 7 Units at 07/10/18 1216   albuterol-ipratropium (DUO-NEB) 2.5 MG-0.5 MG/3 ML, 3 mL, Nebulization, Q6H RT, Slick Shannon MD 
  albuterol (PROVENTIL VENTOLIN) nebulizer solution 2.5 mg, 2.5 mg, Nebulization, Q4H PRN, Jeyson Dempsey MD 
  aspirin chewable tablet 81 mg, 81 mg, Oral, DAILY, Jeyson Dempsey MD, 81 mg at 07/10/18 4427   cetirizine (ZYRTEC) tablet 10 mg, 10 mg, Oral, DAILY PRN, Jeyson Dempsey MD, 10 mg at 07/10/18 4945   fluticasone (FLONASE) 50 mcg/actuation nasal spray 2 Spray, 2 Spray, Both Nostrils, DAILY, Jeyson Dempsey MD, 2 [place] at 07/10/18 1377   furosemide (LASIX) tablet 40 mg, 40 mg, Oral, DAILY, Jeyson Dempsey MD, 40 mg at 07/10/18 5214   insulin glargine (LANTUS) injection 15 Units, 15 Units, SubCUTAneous, DAILY, Jeyson Dempsey MD, 15 Units at 07/10/18 7178   pravastatin (PRAVACHOL) tablet 40 mg, 40 mg, Oral, QHS, Tila Maynard MD, 40 mg at 07/09/18 2112 
  nitroglycerin (NITROSTAT) tablet 0.4 mg, 0.4 mg, SubLINGual, Q5MIN PRN, Tila Maynard MD 
  sodium chloride (NS) flush 5-10 mL, 5-10 mL, IntraVENous, Q8H, Tila Maynard MD, 10 mL at 07/10/18 4302   sodium chloride (NS) flush 5-10 mL, 5-10 mL, IntraVENous, PRN, Tila Maynard MD 
  acetaminophen (TYLENOL) tablet 650 mg, 650 mg, Oral, Q6H PRN, Tila Maynard MD 
  ondansetron (ZOFRAN) injection 4 mg, 4 mg, IntraVENous, Q6H PRN, Tila Maynard MD 
  docusate sodium (COLACE) capsule 100 mg, 100 mg, Oral, DAILY PRN, Tila Maynard MD 
  heparin (porcine) injection 5,000 Units, 5,000 Units, SubCUTAneous, Q8H, Tila Maynard MD, 5,000 Units at 07/10/18 0911 
  insulin lispro (HUMALOG) injection, , SubCUTAneous, AC&HS, Tila Maynard MD, 10 Units at 07/10/18 1216 
  glucose chewable tablet 16 g, 4 Tab, Oral, PRN, Tila Maynard MD 
  dextrose (D50W) injection syrg 12.5-25 g, 12.5-25 g, IntraVENous, PRN, Tila Maynard MD 
  glucagon (GLUCAGEN) injection 1 mg, 1 mg, IntraMUSCular, PRN, Tila Maynard MD 
  azithromycin (ZITHROMAX) 500 mg in 0.9% sodium chloride (MBP/ADV) 250 mL, 500 mg, IntraVENous, Q24H, Tila Maynard MD, Last Rate: 250 mL/hr at 07/10/18 1218, 500 mg at 07/10/18 1218   hydrOXYzine HCl (ATARAX) tablet 50 mg, 50 mg, Oral, Q8H PRN, Tila Maynard MD 
  guaiFENesin ER [facility]) tablet 600 mg, 600 mg, Oral, Q12H, Tila Maynard MD, 600 mg at 07/10/18 9261   methylPREDNISolone (PF) (SOLU-MEDROL) injection 40 mg, 40 mg, IntraVENous, Q8H, Arturo Joshi MD, 40 mg at 07/10/18 7973   cyclobenzaprine (FLEXERIL) tablet 5 mg, 5 mg, Oral, BID, Conchis Izaguirre MD, 5 mg at 07/10/18 0400   nystatin (MYCOSTATIN) 100,000 unit/gram cream, , Topical, BID, Conchis Izaguirre MD 
  metoprolol succinate (TOPROL-XL) XL tablet 25 mg, 25 mg, Oral, DAILY, Conchis Izaguirre MD, 25 mg at 07/10/18 1074   ammonium lactate (LAC-HYDRIN) 12 % lotion, , Topical, DAILY, Lisbeth Mares MD 
  sodium chloride (NS) flush 5-10 mL, 5-10 mL, IntraVENous, PRN, Kei Whitehead MD 
________________________________________________________________________ Care Plan discussed with: 
  Comments Patient y Family RN y   
Care Manager Consultant   Dr Johnnie Mendez Multidiciplinary team rounds were held today with , nursing, pharmacist and clinical coordinator. Patient's plan of care was discussed; medications were reviewed and discharge planning was addressed. ________________________________________________________________________ Total NON critical care TIME: 35    Minutes Total CRITICAL CARE TIME Spent:   Minutes non procedure based Comments >50% of visit spent in counseling and coordination of care    
________________________________________________________________________ Deandra Hawk MD  
 
Procedures: see electronic medical records for all procedures/Xrays and details which were not copied into this note but were reviewed prior to creation of Plan. LABS: 
I reviewed today's most current labs and imaging studies. Pertinent labs include: 
Recent Labs  
   07/10/18 
 0353 WBC  13.1* HGB  10.8* HCT  34.9*  
PLT  147* Recent Labs  
   07/10/18 
 0353  07/09/18 
 0315  07/09/18 
 0006 NA  135*   --   138  
K  4.3   --   4.4 CL  97   --   100 CO2  32   --   34* GLU  298*   --   140* BUN  15   --   15  
CREA  0.93   --   0.82  
CA  10.4*   --   10.7* ALB   --    --   3.2* TBILI   --    --   0.3 SGOT   --    --   20 ALT   --    --   23 INR   --   1.0   --   
 
 
Signed: Deandra Hawk MD

## 2018-07-10 NOTE — WOUND CARE
Wound care Nurse consult from staff nurse stating \" draining wound left lowerleg, purplereddish annalisa area\" and another consult form Dr Cash Caruso stating \" Pt request would care to eval skin of her right breast, areas of induration, possible cellulitis\". Patient is a 58 y/o AAF with chronic lymphedema of BLE, morbidly obese (424 lbs), non-compliant DM with CHF, COPD, GEN, HTN, CAD and arthritis. Patient has yeast type rash under both breast, groins and annalisa-area. Under right breast, on breast,  she has a fluctuant area aprox 2x2 cm's, not inflamed or draining. She has unusual \"lumps\" in that right breast also. Per staff nurse Dr Viktoria Hendrickson is being consulted to assess her right breast - patient has not had a mammogram and therefore cannot have an [address] [facility] per staff. Annalisa-area yeast rash and intertriginous MASD Left posterior LE has an area with thick yellow, foul smelling drainage in combination with hyperkeratosis/skin scaling. Patient needs: 
 
Skin folds: bath with soap and water, pat dry. Apply antifungal cream. 
 
LLE: BID, cleanse posterior wound with 1/4 strength Dakins solution - wiping clean. Apply lac-Hydrin lotion to annalisa-wound skin. Cover wound with Dakins moistened 4x4 and cover with ABD pad secured with julian. BLE thick scaly skin: cleanse with soap and water and apply Lac-Hydrin lotion BID. Plan: patient refuses bariatric bed. Dr Viktoria Hendrickson to see right breast issue.  
 
 
Bret Gill RN, [employer]

## 2018-07-11 LAB
ANION GAP SERPL CALC-SCNC: 4 MMOL/L (ref 5–15)
BASOPHILS # BLD: 0 K/UL (ref 0–0.1)
BASOPHILS NFR BLD: 0 % (ref 0–1)
BUN SERPL-MCNC: 22 MG/DL (ref 6–20)
BUN/CREAT SERPL: 23 (ref 12–20)
CALCIUM SERPL-MCNC: 9.9 MG/DL (ref 8.5–10.1)
CHLORIDE SERPL-SCNC: 98 MMOL/L (ref 97–108)
CO2 SERPL-SCNC: 32 MMOL/L (ref 21–32)
CREAT SERPL-MCNC: 0.95 MG/DL (ref 0.55–1.02)
DIFFERENTIAL METHOD BLD: ABNORMAL
EOSINOPHIL # BLD: 0 K/UL (ref 0–0.4)
EOSINOPHIL NFR BLD: 0 % (ref 0–7)
ERYTHROCYTE [DISTWIDTH] IN BLOOD BY AUTOMATED COUNT: 13.3 % (ref 11.5–14.5)
GLUCOSE BLD STRIP.AUTO-MCNC: 347 MG/DL (ref 65–100)
GLUCOSE BLD STRIP.AUTO-MCNC: 352 MG/DL (ref 65–100)
GLUCOSE BLD STRIP.AUTO-MCNC: 400 MG/DL (ref 65–100)
GLUCOSE BLD STRIP.AUTO-MCNC: 416 MG/DL (ref 65–100)
GLUCOSE BLD STRIP.AUTO-MCNC: 416 MG/DL (ref 65–100)
GLUCOSE SERPL-MCNC: 353 MG/DL (ref 65–100)
HCT VFR BLD AUTO: 34.7 % (ref 35–47)
HGB BLD-MCNC: 10.7 G/DL (ref 11.5–16)
IMM GRANULOCYTES # BLD: 0 K/UL (ref 0–0.04)
IMM GRANULOCYTES NFR BLD AUTO: 0 % (ref 0–0.5)
LYMPHOCYTES # BLD: 0.6 K/UL (ref 0.8–3.5)
LYMPHOCYTES NFR BLD: 4 % (ref 12–49)
MCH RBC QN AUTO: 29.1 PG (ref 26–34)
MCHC RBC AUTO-ENTMCNC: 30.8 G/DL (ref 30–36.5)
MCV RBC AUTO: 94.3 FL (ref 80–99)
MONOCYTES # BLD: 0.4 K/UL (ref 0–1)
MONOCYTES NFR BLD: 3 % (ref 5–13)
NEUTS SEG # BLD: 12.9 K/UL (ref 1.8–8)
NEUTS SEG NFR BLD: 93 % (ref 32–75)
NRBC # BLD: 0 K/UL (ref 0–0.01)
NRBC BLD-RTO: 0 PER 100 WBC
PLATELET # BLD AUTO: 160 K/UL (ref 150–400)
PMV BLD AUTO: 11.4 FL (ref 8.9–12.9)
POTASSIUM SERPL-SCNC: 4.5 MMOL/L (ref 3.5–5.1)
RBC # BLD AUTO: 3.68 M/UL (ref 3.8–5.2)
RBC MORPH BLD: ABNORMAL
SERVICE CMNT-IMP: ABNORMAL
SODIUM SERPL-SCNC: 134 MMOL/L (ref 136–145)
WBC # BLD AUTO: 13.9 K/UL (ref 3.6–11)

## 2018-07-11 PROCEDURE — 80048 BASIC METABOLIC PNL TOTAL CA: CPT | Performed by: INTERNAL MEDICINE

## 2018-07-11 PROCEDURE — 74011636637 HC RX REV CODE- 636/637: Performed by: INTERNAL MEDICINE

## 2018-07-11 PROCEDURE — 36415 COLL VENOUS BLD VENIPUNCTURE: CPT | Performed by: INTERNAL MEDICINE

## 2018-07-11 PROCEDURE — 74011250636 HC RX REV CODE- 250/636: Performed by: INTERNAL MEDICINE

## 2018-07-11 PROCEDURE — 74011000250 HC RX REV CODE- 250: Performed by: INTERNAL MEDICINE

## 2018-07-11 PROCEDURE — 94660 CPAP INITIATION&MGMT: CPT

## 2018-07-11 PROCEDURE — 94640 AIRWAY INHALATION TREATMENT: CPT

## 2018-07-11 PROCEDURE — 85025 COMPLETE CBC W/AUTO DIFF WBC: CPT | Performed by: INTERNAL MEDICINE

## 2018-07-11 PROCEDURE — 65660000000 HC RM CCU STEPDOWN

## 2018-07-11 PROCEDURE — 77010033678 HC OXYGEN DAILY

## 2018-07-11 PROCEDURE — 74011250637 HC RX REV CODE- 250/637: Performed by: INTERNAL MEDICINE

## 2018-07-11 PROCEDURE — 97165 OT EVAL LOW COMPLEX 30 MIN: CPT | Performed by: OCCUPATIONAL THERAPIST

## 2018-07-11 PROCEDURE — 74011250637 HC RX REV CODE- 250/637: Performed by: HOSPITALIST

## 2018-07-11 PROCEDURE — 97535 SELF CARE MNGMENT TRAINING: CPT | Performed by: OCCUPATIONAL THERAPIST

## 2018-07-11 PROCEDURE — 82962 GLUCOSE BLOOD TEST: CPT

## 2018-07-11 RX ORDER — OXYCODONE HYDROCHLORIDE 5 MG/1
5 TABLET ORAL
Status: DISCONTINUED | OUTPATIENT
Start: 2018-07-11 | End: 2018-07-13 | Stop reason: HOSPADM

## 2018-07-11 RX ORDER — METOPROLOL SUCCINATE 25 MG/1
25 TABLET, EXTENDED RELEASE ORAL EVERY 12 HOURS
Status: DISCONTINUED | OUTPATIENT
Start: 2018-07-11 | End: 2018-07-13 | Stop reason: HOSPADM

## 2018-07-11 RX ORDER — INSULIN GLARGINE 100 [IU]/ML
25 INJECTION, SOLUTION SUBCUTANEOUS DAILY
Status: DISCONTINUED | OUTPATIENT
Start: 2018-07-12 | End: 2018-07-13 | Stop reason: HOSPADM

## 2018-07-11 RX ORDER — AMMONIUM LACTATE 12 G/100G
LOTION TOPICAL EVERY 12 HOURS
Status: DISCONTINUED | OUTPATIENT
Start: 2018-07-11 | End: 2018-07-13 | Stop reason: HOSPADM

## 2018-07-11 RX ORDER — INSULIN LISPRO 100 [IU]/ML
14 INJECTION, SOLUTION INTRAVENOUS; SUBCUTANEOUS ONCE
Status: COMPLETED | OUTPATIENT
Start: 2018-07-11 | End: 2018-07-11

## 2018-07-11 RX ORDER — NYSTATIN 100000 U/G
CREAM TOPICAL EVERY 12 HOURS
Status: DISCONTINUED | OUTPATIENT
Start: 2018-07-11 | End: 2018-07-13 | Stop reason: HOSPADM

## 2018-07-11 RX ORDER — FUROSEMIDE 40 MG/1
40 TABLET ORAL 2 TIMES DAILY
Status: DISCONTINUED | OUTPATIENT
Start: 2018-07-11 | End: 2018-07-13 | Stop reason: HOSPADM

## 2018-07-11 RX ORDER — ALBUTEROL SULFATE 90 UG/1
1 AEROSOL, METERED RESPIRATORY (INHALATION)
Status: DISCONTINUED | OUTPATIENT
Start: 2018-07-11 | End: 2018-07-13 | Stop reason: HOSPADM

## 2018-07-11 RX ORDER — INSULIN GLARGINE 100 [IU]/ML
10 INJECTION, SOLUTION SUBCUTANEOUS ONCE
Status: COMPLETED | OUTPATIENT
Start: 2018-07-11 | End: 2018-07-11

## 2018-07-11 RX ORDER — MICONAZOLE NITRATE 20 MG/G
CREAM TOPICAL
Status: DISCONTINUED | OUTPATIENT
Start: 2018-07-11 | End: 2018-07-13 | Stop reason: HOSPADM

## 2018-07-11 RX ADMIN — CYCLOBENZAPRINE HYDROCHLORIDE 5 MG: 10 TABLET, FILM COATED ORAL at 08:29

## 2018-07-11 RX ADMIN — Medication: at 22:27

## 2018-07-11 RX ADMIN — INSULIN LISPRO 14 UNITS: 100 INJECTION, SOLUTION INTRAVENOUS; SUBCUTANEOUS at 12:47

## 2018-07-11 RX ADMIN — Medication 10 ML: at 06:46

## 2018-07-11 RX ADMIN — OXYCODONE HYDROCHLORIDE 5 MG: 5 TABLET ORAL at 12:49

## 2018-07-11 RX ADMIN — SODIUM HYPOCHLORITE: 1.25 SOLUTION TOPICAL at 08:38

## 2018-07-11 RX ADMIN — METOPROLOL SUCCINATE 25 MG: 25 TABLET, EXTENDED RELEASE ORAL at 08:29

## 2018-07-11 RX ADMIN — INSULIN LISPRO 14 UNITS: 100 INJECTION, SOLUTION INTRAVENOUS; SUBCUTANEOUS at 13:00

## 2018-07-11 RX ADMIN — FUROSEMIDE 40 MG: 40 TABLET ORAL at 08:29

## 2018-07-11 RX ADMIN — INSULIN LISPRO 10 UNITS: 100 INJECTION, SOLUTION INTRAVENOUS; SUBCUTANEOUS at 17:12

## 2018-07-11 RX ADMIN — GUAIFENESIN 600 MG: 600 TABLET, EXTENDED RELEASE ORAL at 22:23

## 2018-07-11 RX ADMIN — IPRATROPIUM BROMIDE AND ALBUTEROL SULFATE 3 ML: .5; 3 SOLUTION RESPIRATORY (INHALATION) at 19:36

## 2018-07-11 RX ADMIN — CYCLOBENZAPRINE HYDROCHLORIDE 5 MG: 10 TABLET, FILM COATED ORAL at 22:23

## 2018-07-11 RX ADMIN — ASPIRIN 81 MG 81 MG: 81 TABLET ORAL at 08:29

## 2018-07-11 RX ADMIN — INSULIN GLARGINE 15 UNITS: 100 INJECTION, SOLUTION SUBCUTANEOUS at 08:32

## 2018-07-11 RX ADMIN — NYSTATIN: 100000 CREAM TOPICAL at 22:26

## 2018-07-11 RX ADMIN — HEPARIN SODIUM 5000 UNITS: 5000 INJECTION, SOLUTION INTRAVENOUS; SUBCUTANEOUS at 17:13

## 2018-07-11 RX ADMIN — OXYCODONE HYDROCHLORIDE 5 MG: 5 TABLET ORAL at 18:43

## 2018-07-11 RX ADMIN — NYSTATIN: 100000 CREAM TOPICAL at 08:37

## 2018-07-11 RX ADMIN — IPRATROPIUM BROMIDE AND ALBUTEROL SULFATE 3 ML: .5; 3 SOLUTION RESPIRATORY (INHALATION) at 02:10

## 2018-07-11 RX ADMIN — METOPROLOL SUCCINATE 25 MG: 25 TABLET, EXTENDED RELEASE ORAL at 22:24

## 2018-07-11 RX ADMIN — Medication 10 ML: at 12:48

## 2018-07-11 RX ADMIN — FUROSEMIDE 40 MG: 40 TABLET ORAL at 17:14

## 2018-07-11 RX ADMIN — FLUTICASONE PROPIONATE 2 SPRAY: 50 SPRAY, METERED NASAL at 08:38

## 2018-07-11 RX ADMIN — PRAVASTATIN SODIUM 40 MG: 40 TABLET ORAL at 22:24

## 2018-07-11 RX ADMIN — AZITHROMYCIN MONOHYDRATE 500 MG: 500 INJECTION, POWDER, LYOPHILIZED, FOR SOLUTION INTRAVENOUS at 12:18

## 2018-07-11 RX ADMIN — IPRATROPIUM BROMIDE AND ALBUTEROL SULFATE 3 ML: .5; 3 SOLUTION RESPIRATORY (INHALATION) at 13:41

## 2018-07-11 RX ADMIN — HUMAN INSULIN 15 UNITS: 100 INJECTION, SUSPENSION SUBCUTANEOUS at 18:44

## 2018-07-11 RX ADMIN — HEPARIN SODIUM 5000 UNITS: 5000 INJECTION, SOLUTION INTRAVENOUS; SUBCUTANEOUS at 08:30

## 2018-07-11 RX ADMIN — GUAIFENESIN 600 MG: 600 TABLET, EXTENDED RELEASE ORAL at 08:30

## 2018-07-11 RX ADMIN — INSULIN LISPRO 10 UNITS: 100 INJECTION, SOLUTION INTRAVENOUS; SUBCUTANEOUS at 07:30

## 2018-07-11 RX ADMIN — INSULIN LISPRO 5 UNITS: 100 INJECTION, SOLUTION INTRAVENOUS; SUBCUTANEOUS at 22:24

## 2018-07-11 RX ADMIN — INSULIN GLARGINE: 100 INJECTION, SOLUTION SUBCUTANEOUS at 12:46

## 2018-07-11 RX ADMIN — Medication 10 ML: at 22:24

## 2018-07-11 RX ADMIN — HUMAN INSULIN 12 UNITS: 100 INJECTION, SUSPENSION SUBCUTANEOUS at 06:51

## 2018-07-11 RX ADMIN — Medication: at 08:28

## 2018-07-11 RX ADMIN — SODIUM HYPOCHLORITE: 1.25 SOLUTION TOPICAL at 22:26

## 2018-07-11 RX ADMIN — METHYLPREDNISOLONE SODIUM SUCCINATE 40 MG: 40 INJECTION, POWDER, FOR SOLUTION INTRAMUSCULAR; INTRAVENOUS at 06:45

## 2018-07-11 RX ADMIN — IPRATROPIUM BROMIDE AND ALBUTEROL SULFATE 3 ML: .5; 3 SOLUTION RESPIRATORY (INHALATION) at 07:38

## 2018-07-11 RX ADMIN — METHYLPREDNISOLONE SODIUM SUCCINATE 40 MG: 40 INJECTION, POWDER, FOR SOLUTION INTRAMUSCULAR; INTRAVENOUS at 18:43

## 2018-07-11 NOTE — PROGRESS NOTES
ADULT PROTOCOL: JET AEROSOL ASSESSMENT    Patient  Henry Jaimes     57 y.o.   female     7/11/2018  11:32 AM    Breath Sounds Pre Procedure: Right Breath Sounds: Diminished                               Left Breath Sounds: Diminished    Breath Sounds Post Procedure: Right Breath Sounds: Diminished                                 Left Breath Sounds: Diminished    Breathing pattern: Pre procedure Breathing Pattern: Regular          Post procedure Breathing Pattern: Regular    Heart Rate: Pre procedure Pulse: 71           Post procedure Pulse: 69    Resp Rate: Pre procedure Respirations: 18           Post procedure Respirations: 18    Cough: Pre procedure Cough: Non-productive               Post procedure Cough: Non-productive    Oxygen: 6L/NC     Changed: No    SpO2: Pre procedure SpO2: 97 %                  Post procedure SpO2: 97 %     Nebulizer Therapy: Current medications Aerosolized Medications: DuoNeb      Changed: NO    Problem List:   Patient Active Problem List   Diagnosis Code    CHF (congestive heart failure) ([facility]) I50.9    Malignant essential hypertension I10    Asthma J45.909    Obesity hypoventilation syndrome ([facility]) E66.2    Dyspnea R06.00    Chest pressure R07.89    Acute on chronic diastolic heart failure ([facility]) I50.33    SVT (supraventricular tachycardia) ([facility]) I47.1    NSTEMI (non-ST elevated myocardial infarction) ([facility]) I21.4    S/P PTCA (percutaneous transluminal coronary angioplasty) Z98.61    Coronary atherosclerosis of native coronary artery I25.10    Hypoxia R09.02    Noncompliance with therapeutic plan Z91.11    Chest pain R07.9    GERD (gastroesophageal reflux disease) K21.9    Acute respiratory failure with hypoxia and hypercarbia ([facility]) J96.01, J96.02    COPD (chronic obstructive pulmonary disease) ([facility]) J44.9    Tobacco abuse Z72.0    Obesity, morbid (more than 100 lbs over ideal weight or BMI > 40) ([facility]) E66.01    Cellulitis L03.90    SIRS due to infectious process with acute organ dysfunction (HCC) A41.9, R65.20    Sepsis associated hypotension ([facility]) A41.9    Gangrenous toe ([facility] [address] I96    Type II diabetes mellitus, uncontrolled ([address] E11.65    HTN (hypertension) S38    Diastolic CHF, chronic ([facility]) I50.32    Cough with hemoptysis R04.2    Lymphedema of both lower extremities I89.0    CAP (community acquired pneumonia) J18.9    Cellulitis, leg L03.119    COPD exacerbation ([facility]) J44.1    Maggot infestation B87.9    SOB (shortness of breath) R06.02    Shortness of breath R06.02    Multifocal pneumonia J18.9    Acute respiratory failure ([facility]) J96.00    Acute on chronic respiratory failure with hypoxia and hypercapnia ([facility]) J96.21, J96.22       Respiratory Therapist: Jennifer Garces, RT

## 2018-07-11 NOTE — PROGRESS NOTES
Occupational Therapy EVALUATION/discharge  Patient: Yariel Fabian (41 y.o. female)  Date: 2018  Primary Diagnosis: Acute respiratory failure (HCC)        Precautions:  Contact, Fall (Pt is > 400lbs)    ASSESSMENT:   Based on the objective data described below, the patient presents with morbid obesity, RAZO with 6L supplemental O2 at baseline, decreased strength, endurance, mobility, safety and c/o pain in shoulders, buttocks and LEs. Unfortunately she is at her ADL baseline requiring assist with most, amb very short distances and home bound. She has aides 6 hours per day M-Sun, respite care from 5p-8p at least 3 days a week and then family to assist all other times. Further skilled acute occupational therapy is not indicated or desired pt pt at this time. She did state she had a bad day yesterday and told PT she wouldn't participate, but has now changed her mind and is requesting they come back starting tomorrow. RN notified. Discharge Recommendations: None in addition to her 24/ assist at home  Further Equipment Recommendations for Discharge: none in addition to what she already owns      SUBJECTIVE:   Patient stated My bottom hurts so bad.     OBJECTIVE DATA SUMMARY:   HISTORY:   Past Medical History:   Diagnosis Date    Arthritis     Asthma     CAD (coronary artery disease)     Congestive heart failure ([address]     COPD (chronic obstructive pulmonary disease) ([address]     Diabetes ([address]     Hypertension     Morbid obesity with BMI of 70 and over, adult ([address]     NSTEMI (non-ST elevated myocardial infarction) ([address]     [facility]      Past Surgical History:   Procedure Laterality Date    CARDIAC SURG PROCEDURE UNLIST      Stents    HX  SECTION      HX ORTHOPAEDIC      HX OTHER SURGICAL      cyst removed from back       Prior Level of Function/Environment/Context: requiring assist with most, amb very short distances and home bound.   She has aides 6 hours per day M-Sun, respite care from 5p-8p at least 3 days a week and then family to assist all other times. Home Situation  Home Environment: Private residence  # Steps to Enter: 5  Rails to Enter: Yes  Hand Rails : Bilateral  One/Two Story Residence: One story  Living Alone: No  Support Systems: Family member(s) (nephew, aides M-Sun9:30a-3:30p and respite 3 days5-8p)  Patient Expects to be Discharged to[de-identified] Private residence  Current DME Used/Available at Home: Oxygen, portable, Raised toilet seat, Walker, rollator, Wheelchair, Hospital bed, Commode, bedside (6L O2 24/7)  Tub or Shower Type:  (sponge bathes only)    Hand dominance: Right    EXAMINATION OF PERFORMANCE DEFICITS:  Cognitive/Behavioral Status:  Neurologic State: Alert; Appropriate for age  Orientation Level: Oriented X4  Cognition: Appropriate decision making; Appropriate for age attention/concentration; Appropriate safety awareness; Follows commands  Perception: Appears intact  Perseveration: No perseveration noted  Safety/Judgement: Awareness of environment; Fall prevention; Insight into deficits    Hearing: Auditory  Auditory Impairment: None    Vision/Perceptual:    Acuity: Within Defined Limits    Corrective Lenses: Glasses    Range of Motion:  AROM: Generally decreased, functional  PROM: Generally decreased, functional                      Strength:  Strength: Generally decreased, functional                Coordination:  Coordination: Generally decreased, functional  Fine Motor Skills-Upper: Left Impaired;Right Impaired    Gross Motor Skills-Upper: Left Intact; Right Intact    Tone & Sensation:  Tone: Normal  Sensation: Impaired (B hands and feet)                      Balance:  Sitting: Intact  Standing: Impaired  Standing - Static: Fair  Standing - Dynamic : Fair    Functional Mobility and Transfers for ADLs:  Bed Mobility:  Supine to Sit: Moderate assistance; Additional time;Assist x1 (due to body habitus and pain)  Sit to Supine:  Moderate assistance; Additional time;Assist x1 (due to body habitus and pain)    Transfers:  Sit to Stand: Minimum assistance  Stand to Sit: Minimum assistance  Bed to Chair: Contact guard assistance  Toilet Transfer : Contact guard assistance    ADL Assessment:  Feeding: Modified independent    Oral Facial Hygiene/Grooming: Setup; Additional time (seated)    Bathing: Maximum assistance; Additional time;Assist x1 (A for cleanliness, to reach all body parts- aide performs)    Upper Body Dressing: Minimum assistance; Additional time;Assist x1    Lower Body Dressing: Total assistance    Toileting: Total assistance                ADL Intervention and task modifications:  Pt educated on home safety, fall prevention and use of adaptive equipment and bariatric DME to increase her independence with ADLs. She verbalized fair understanding and states she is satisfied with her routine and assistance she has at home. Cognitive Retraining  Safety/Judgement: Awareness of environment; Fall prevention; Insight into deficits    Functional Measure:  Barthel Index:    Bathin  Bladder: 5  Bowels: 5  Groomin  Dressin  Feeding: 10  Mobility: 0  Stairs: 0  Toilet Use: 0  Transfer (Bed to Chair and Back): 5  Total: 30       Barthel and G-code impairment scale:  Percentage of impairment CH  0% CI  1-19% CJ  20-39% CK  40-59% CL  60-79% CM  80-99% CN  100%   Barthel Score 0-100 100 99-80 79-60 59-40 20-39 1-19   0   Barthel Score 0-20 20 17-19 13-16 9-12 5-8 1-4 0      The Barthel ADL Index: Guidelines  1. The index should be used as a record of what a patient does, not as a record of what a patient could do. 2. The main aim is to establish degree of independence from any help, physical or verbal, however minor and for whatever reason. 3. The need for supervision renders the patient not independent. 4. A patient's performance should be established using the best available evidence.  Asking the patient, friends/relatives and nurses are the usual sources, but direct observation and common sense are also important. However direct testing is not needed. 5. Usually the patient's performance over the preceding 24-48 hours is important, but occasionally longer periods will be relevant. 6. Middle categories imply that the patient supplies over 50 per cent of the effort. 7. Use of aids to be independent is allowed. Harlee Cowden., Barthel, D.W. (3979). Functional evaluation: the Barthel Index. [address] [facility] (14)2. PEACE Gonzales, Dima Hinds, Philip Chandler., Kimberly, [facility] (1999). Measuring the change indisability after inpatient rehabilitation; comparison of the responsiveness of the Barthel Index and Functional [place] Measure. Journal of Neurology, Neurosurgery, and Psychiatry, 66(4), 552-183. KRIS Williamson, AGUSTO Zarco, & Javier Josue M.A. (2004.) Assessment of post-stroke quality of life in cost-effectiveness studies: The usefulness of the Barthel Index and the EuroQoL-5D. Quality of Life Research, 13, 923-56       G codes: In compliance with CMSs Claims Based Outcome Reporting, the following G-code set was chosen for this patient based on their primary functional limitation being treated: The outcome measure chosen to determine the severity of the functional limitation was the Barthel Index with a score of 30/100 which was correlated with the impairment scale. ?  Self Care:     - CURRENT STATUS: CL - 60%-79% impaired, limited or restricted    - GOAL STATUS: CL - 60%-79% impaired, limited or restricted    - D/C STATUS:  CL - 60%-79% impaired, limited or restricted     Occupational Therapy Evaluation Charge Determination   History Examination Decision-Making   LOW Complexity : Brief history review  HIGH Complexity : 5 or more performance deficits relating to physical, cognitive , or psychosocial skils that result in activity limitations and / or participation restrictions MEDIUM Complexity : Patient may present with comorbidities that affect occupational performnce. Miniml to moderate modification of tasks or assistance (eg, physical or verbal ) with assesment(s) is necessary to enable patient to complete evaluation       Based on the above components, the patient evaluation is determined to be of the following complexity level: LOW   Pain:  Pain Scale 1: Numeric (0 - 10)  Pain Intensity 1: 5  Pain Location 1: Back     Pain Description 1: Burning; Jetty Dense; Stabbing  Pain Intervention(s) 1: Medication (see MAR)  Activity Tolerance:   Poor  Please refer to the flowsheet for vital signs taken during this treatment. After treatment:   []  Patient left in no apparent distress sitting up in chair  [x]  Patient left in no apparent distress in bed  [x]  Call bell left within reach  [x]  Nursing notified  []  Caregiver present  []  Bed alarm activated    COMMUNICATION/EDUCATION:   Communication/Collaboration:  [x]      Home safety education was provided and the patient/caregiver indicated understanding. [x]      Patient/family have participated as able and agree with findings and recommendations. []      Patient is unable to participate in plan of care at this time.   Findings and recommendations were discussed with: Registered Nurse    Karen Bermudez OT  Time Calculation: 25 mins

## 2018-07-11 NOTE — PROGRESS NOTES
[facility]  Pulmonary, Critical Care, and Sleep Medicine    Patient Consult    Name: Anirudh Lange MRN: [id number]   : 1970 Hospital: [facility]   Date: 2018        IMPRESSION:   · Acute on Chronic Hypercarbic and Hypoxic Respiratory Failure, pt reports that she is usually on 6L per NC. She was followed by Dr. Kiel Dennis but has not seen him for a long time. She feels that she is a little better today. · Had acute on chronic worsening of dyspnea. · Wound cellulitis to her right breast. She is requesting would care eval, may need a skin biopsy as an outpt. · Super Morbid Obesity  · Peripheral Edema, has been about the same. · DM  · Hypertension  · Hyperlipidemia. · Hx of Heart failure, Diastolic Dysfunction. NO mention of Right sided heart dysfunction. · Mild Anemia  · ? ?GEN on home CPAP or bipap but has not been using it recently. · Discussed with Dr. Brody Bonilla this am.       RECOMMENDATIONS:   · Agree with current bipap use, Make sure pt uses the BIPAP at night and prn during day. Has now bee on NC oxygen. · Pt and OT eval. But suspect she is very debilitated at baseline. · On Nebs  · On lasix po  · ON Azithromycin IV  · Decreased dose of steroids  · Oxygen to keep POx over 90%  · Glycemic monitor and control especially while on steroids  · ON DVT prophylaxis dosing heparin. Subjective:     Last 24 hrs: She does feel that her breathing is better. No productive cough. NO chest pain, no back pain. She noted some area of induration of her right breast area. She reports she feels some epigastric discomfort. She is unable to lie her bed due pain and shortness of breath. No headaches. Has been tolerating po intake well. NO aspiration. Rest of 10 point ROS is negative for acute changes. Chief complaint: shortness of breath. Fatigue. This patient has been seen and evaluated at the request of Dr. Brody Bonilla for above.  Patient is a 57 y.o. female Who presents for evaluation of above. Appears to have right sided heart dysfunction. Has been occurring for the last  4 days. Hosey Carrel is on about 6 L of oxygen by nasal cannula at home and lately started having worsening of shortness of breath.  She reports shortness of breath is worsened with exertion with orthopnea but no PND. Hosey Carrel also reports chronic cough with increased quantities of phlegm mainly white or clear.  She is also having difficulty using her nebulizer machine at home. Hosey Carrel has not been using her bipap machine recently. Denies chest pain, palpitations or syncope.  Denies abdominal pain or diarrhea. Has been able to eat and drink well. Has not been sleeping well at night recently. On arrival to the hospital, she was tachypneic and had a blood gas which revealed a PCO2 of 75.  She received Solu-Medrol, DuoNeb and was started on BiPAP. Since being started on treatments she was feeling better. She was seen upstairs in her room. She was tolerating bipap well. She reports she was ready to eat her breakfast when seen this am.             Past Medical History:   Diagnosis Date    Arthritis     Asthma     CAD (coronary artery disease)     [facility]     COPD (chronic obstructive pulmonary disease) ([facility]     Diabetes ([facility]     Hypertension     Morbid obesity with BMI of 70 and over, adult ([facility]     NSTEMI (non-ST elevated myocardial infarction) ([facility]     SVT (supraventricular tachycardia) ([facility]       Past Surgical History:   Procedure Laterality Date    CARDIAC SURG PROCEDURE UNLIST      Stents    HX  SECTION      HX ORTHOPAEDIC      HX OTHER SURGICAL      cyst removed from back      Prior to Admission medications    Medication Sig Start Date End Date Taking? Authorizing Provider   albuterol (VENTOLIN HFA) 90 mcg/actuation inhaler Take 2 Puffs by inhalation every six (6) hours as needed for Wheezing.    Yes Historical Provider   cyclobenzaprine (FLEXERIL) 5 mg tablet Take 5 mg by mouth two (2) times a day. flexeriol   Yes Historical Provider   metoprolol succinate (TOPROL-XL) 25 mg XL tablet Take  by mouth two (2) times a day. Yes Landon Raza MD   furosemide (LASIX) 40 mg tablet Take 40 mg by mouth two (2) times a day. Indications: Peripheral Edema due to Chronic Heart Failure   Yes Landon Raza MD   albuterol (PROVENTIL VENTOLIN) 2.5 mg /3 mL (0.083 %) nebulizer solution 3 mL by Nebulization route every four (4) hours as needed for Wheezing. 2/3/18  Yes Heath Chavez MD   aspirin 81 mg chewable tablet Take 81 mg by mouth daily. Yes Historical Provider   insulin glargine (LANTUS) 100 unit/mL injection 15 Units by SubCUTAneous route daily. Yes Historical Provider   hydrOXYzine pamoate (VISTARIL) 50 mg capsule Take 50 mg by mouth every eight (8) hours as needed for Itching. Indications: Pruritus of Skin   Yes Historical Provider   nystatin (MYCOSTATIN) topical cream Apply  to affected area two (2) times daily as needed for Skin Irritation or Itching. Yes Historical Provider   fluticasone (FLONASE) 50 mcg/actuation nasal spray 2 Sprays by Both Nostrils route daily. 9/9/17  Yes Geneva Gallardo MD   lovastatin (MEVACOR) 40 mg tablet Take 1 Tab by mouth nightly. 1/14/16  Yes Milo Diaz NP   glyBURIDE (DIABETA) 5 mg tablet Take 5 mg by mouth Daily (before breakfast). Yes Historical Provider   cetirizine (ZYRTEC) 10 mg tablet Take 10 mg by mouth daily as needed for Allergies. Yes Historical Provider   ketoconazole (NIZORAL) 2 % topical cream Apply  to affected area daily. Yes Historical Provider   ammonium lactate (LAC-HYDRIN) 12 % topical cream rub in to affected area well 11/21/14  Yes Phil Main MD   nitroglycerin (NITROSTAT) 0.4 mg SL tablet 1 Tab by SubLINGual route every five (5) minutes as needed for Chest Pain (call 911 if not relieved by 3).  9/12/13  Yes Milo Diaz NP     No Known Allergies   Social History   Substance Use Topics    Smoking status: Current Every Day Smoker     Packs/day: 0.25     Types: Cigarettes    Smokeless tobacco: Never Used      Comment: Patient states \"I  aint smoking no more d*mn cigarettes after yesterday\" 01/26/2018    Alcohol use No      Family History   Problem Relation Age of Onset    Heart Disease Mother     Heart Disease Father     Heart Disease Sister         Current Facility-Administered Medications   Medication Dose Route Frequency    methylPREDNISolone (PF) (SOLU-MEDROL) injection 40 mg  40 mg IntraVENous BID    insulin NPH (NOVOLIN N, HUMULIN N) injection 15 Units  15 Units SubCUTAneous BID    [START ON 7/12/2018] insulin glargine (LANTUS) injection 25 Units  25 Units SubCUTAneous DAILY    furosemide (LASIX) tablet 40 mg  40 mg Oral BID    metoprolol succinate (TOPROL-XL) XL tablet 25 mg  25 mg Oral Q12H    insulin lispro (HUMALOG) injection 14 Units  14 Units SubCUTAneous ONCE    albuterol-ipratropium (DUO-NEB) 2.5 MG-0.5 MG/3 ML  3 mL Nebulization Q6H RT    sodium hypochlorite (QUARTER STRENGTH DAKIN'S) 0.125% irrigation (bottle)   Topical BID    ammonium lactate (LAC-HYDRIN) 12 % lotion   Topical BID    aspirin chewable tablet 81 mg  81 mg Oral DAILY    fluticasone (FLONASE) 50 mcg/actuation nasal spray 2 Spray  2 Spray Both Nostrils DAILY    pravastatin (PRAVACHOL) tablet 40 mg  40 mg Oral QHS    sodium chloride (NS) flush 5-10 mL  5-10 mL IntraVENous Q8H    heparin (porcine) injection 5,000 Units  5,000 Units SubCUTAneous Q8H    insulin lispro (HUMALOG) injection   SubCUTAneous AC&HS    azithromycin (ZITHROMAX) 500 mg in 0.9% sodium chloride (MBP/ADV) 250 mL  500 mg IntraVENous Q24H    guaiFENesin ER (MUCINEX) tablet 600 mg  600 mg Oral Q12H    cyclobenzaprine (FLEXERIL) tablet 5 mg  5 mg Oral BID    nystatin (MYCOSTATIN) 100,000 unit/gram cream   Topical BID       Review of Systems:  A comprehensive review of systems was negative.     Objective:   Vital Signs:    Visit Vitals    BP (!) 165/102 (BP 1 Location: Right arm)    Pulse 74    Temp 98 °F (36.7 °C)    Resp 18    Wt (!) 193.1 kg (425 lb 11.3 oz)    SpO2 99%    Breastfeeding No    BMI 68.71 kg/m2       O2 Device: Nasal cannula   O2 Flow Rate (L/min): 6 l/min   Temp (24hrs), Av °F (36.7 °C), Min:97.3 °F (36.3 °C), Max:98.7 °F (37.1 °C)       Intake/Output:   Last shift:      701 - 1900  In: 240 [P.O.:240]  Out: 350 [Urine:350]  Last 3 shifts: 1901 -  07  In: 240 [P.O.:240]  Out: 1850 [Urine:1850]    Intake/Output Summary (Last 24 hours) at 18 1200  Last data filed at 18 0843   Gross per 24 hour   Intake              240 ml   Output              700 ml   Net             -460 ml      Physical Exam:   General:  Morbidly obese, female, Alert, cooperative, no distress, appears stated age. On [place] oxygen eating breakfast. Conversant. Head:  Normocephalic, without obvious abnormality, atraumatic. Eyes:  Conjunctivae/corneas clear. PERRL, EOMs intact. Nose: Nares normal. Septum midline. Mucosa normal. No drainage or sinus tenderness. Throat: Lips, mucosa, and tongue normal. Teeth and gums normal.   Neck: Supple, symmetrical, trachea midline, no adenopathy, thyroid: no enlargment/tenderness/nodules, no carotid bruit and no JVD. Back:   Symmetric, no curvature. ROM normal.   Lungs:   Clear to auscultation bilaterally. Good air movement. Chest wall:  No tenderness or deformity. Heart:  Regular rate and rhythm, S1, S2 normal, no murmur, click, rub or gallop. Abdomen:   Obese, Soft, non-tender. Bowel sounds normal. No masses,  No organomegaly. Extremities: Extremities normal, atraumatic, no cyanosis, 3+ bilateral edema. Brawny edema. Pulses: 2+ and symmetric all extremities. Skin: Skin color, texture, turgor normal. No rashes or lesions   Lymph nodes: Cervical, supraclavicular, and axillary nodes normal.   Neurologic: Grossly nonfocal, has good strength of BUE and BLE.  Psych: No anxiety or depression. Data review:     Recent Results (from the past 24 hour(s))   GLUCOSE, POC    Collection Time: 07/10/18  4:29 PM   Result Value Ref Range    Glucose (POC) 384 (H) 65 - 100 mg/dL    Performed by SKY PICHARDO (PCT) CON    GLUCOSE, POC    Collection Time: 07/10/18 10:37 PM   Result Value Ref Range    Glucose (POC) 315 (H) 65 - 100 mg/dL    Performed by Araceli MOONEY    CBC WITH AUTOMATED DIFF    Collection Time: 07/11/18  4:42 AM   Result Value Ref Range    WBC 13.9 (H) 3.6 - 11.0 K/uL    RBC 3.68 (L) 3.80 - 5.20 M/uL    HGB 10.7 (L) 11.5 - 16.0 g/dL    HCT 34.7 (L) 35.0 - 47.0 %    MCV 94.3 80.0 - 99.0 FL    MCH 29.1 26.0 - 34.0 PG    MCHC 30.8 30.0 - 36.5 g/dL    RDW 13.3 11.5 - 14.5 %    PLATELET 288 547 - 335 K/uL    MPV 11.4 8.9 - 12.9 FL    NRBC 0.0 0  WBC    ABSOLUTE NRBC 0.00 0.00 - 0.01 K/uL    NEUTROPHILS 93 (H) 32 - 75 %    LYMPHOCYTES 4 (L) 12 - 49 %    MONOCYTES 3 (L) 5 - 13 %    EOSINOPHILS 0 0 - 7 %    BASOPHILS 0 0 - 1 %    IMMATURE GRANULOCYTES 0 0.0 - 0.5 %    ABS. NEUTROPHILS 12.9 (H) 1.8 - 8.0 K/UL    ABS. LYMPHOCYTES 0.6 (L) 0.8 - 3.5 K/UL    ABS. MONOCYTES 0.4 0.0 - 1.0 K/UL    ABS. EOSINOPHILS 0.0 0.0 - 0.4 K/UL    ABS. BASOPHILS 0.0 0.0 - 0.1 K/UL    ABS. IMM.  GRANS. 0.0 0.00 - 0.04 K/UL    DF AUTOMATED      RBC COMMENTS NORMOCYTIC, NORMOCHROMIC     METABOLIC PANEL, BASIC    Collection Time: 07/11/18  4:42 AM   Result Value Ref Range    Sodium 134 (L) 136 - 145 mmol/L    Potassium 4.5 3.5 - 5.1 mmol/L    Chloride 98 97 - 108 mmol/L    CO2 32 21 - 32 mmol/L    Anion gap 4 (L) 5 - 15 mmol/L    Glucose 353 (H) 65 - 100 mg/dL    BUN 22 (H) 6 - 20 MG/DL    Creatinine 0.95 0.55 - 1.02 MG/DL    BUN/Creatinine ratio 23 (H) 12 - 20      GFR est AA >60 >60 ml/min/1.73m2    GFR est non-AA >60 >60 ml/min/1.73m2    Calcium 9.9 8.5 - 10.1 MG/DL   GLUCOSE, POC    Collection Time: 07/11/18  6:48 AM   Result Value Ref Range    Glucose (POC) 352 (H) 65 - 100 mg/dL Performed by Richard Levi    GLUCOSE, POC    Collection Time: 07/11/18  7:24 AM   Result Value Ref Range    Glucose (POC) 400 (H) 65 - 100 mg/dL    Performed by SKY PICHARDO (PCT) CON    GLUCOSE, POC    Collection Time: 07/11/18 11:40 AM   Result Value Ref Range    Glucose (POC) 416 (H) 65 - 100 mg/dL    Performed by Lesli Garcia (PCT)      EKG: NSR       Imaging:  I have personally reviewed the patients radiographs and have reviewed the reports:  7-8-18: COMPARISON:  April 25, 2018     FINDINGS: A portable AP radiograph of the chest was obtained at 2316 hours. The  patient is on a cardiac monitor. The lungs are hyperinflated. The lung bases  are obscured due to overlying soft tissue. The cardiac and mediastinal contours  and pulmonary vascularity are normal.  The bones and soft tissues are grossly  within normal limits.      IMPRESSION: No acute findings.         Ann Marie Swartz MD

## 2018-07-11 NOTE — PROGRESS NOTES
Cm acknowledged consult for assistance with transportation, hh and schedule appointment with Dr. Xena Granados. Patient has Medicaid and transportation is provided to/from appointments by [employer]. Sherryle Bolder has informed CM that they have made multiple attempts to assist with transportation arrangements while patient has been admitted to their services. [employer] requires 5 days notice for appointment transportation to be scheduled. Patient lives with son and had Medicaid Aid Mon-Fri 9-3pm.    HH - patient was on services with [facility] prior to admission. Ryan Watkins RNCM informed this CM that patient is not appropriate for at home care and they may not be able to continue to provide service due to no realistic goals. Dr. Xena Granados - appointment to be scheduled when discharge date and plan is known. Noted on one stop. Cm will continue to follow.     Evangelista Nassar RN CM  Ext [phone] Impression: Neoplasm of uncertain behavior of connective tissue of eyelid: D48.1.  Plan: Lobular papilloma RLL, see oculoplastics for removal.

## 2018-07-11 NOTE — PROGRESS NOTES
PCU SHIFT NURSING NOTE      Bedside and Verbal shift change report given to Dalila Jaimes RN (oncoming nurse) by Patricio Rayo (offgoing nurse). Report included the following information SBAR, Kardex, MAR and Recent Results. Shift Summary:   7449: Razia KIM  [phone]: Spoke with Dr. Elisabeth Carrillo patient is having low back pain and said tylenol makes her sick. Received orders for 15 mg IV Toradol. Will continue to monitor. 0740: Bedside and Verbal shift change report given to Alexa Araujo RN (oncoming nurse) by Dalila Jaimes RN (offgoing nurse). Report included the following information SBAR, Kardex, MAR and Recent Results. Admission Date 7/8/2018   Admission Diagnosis Acute respiratory failure ([facility] [address]   Consults IP CONSULT TO PULMONOLOGY        Consults   []PT   []OT   []Speech   []Case Management      [] Palliative      Cardiac Monitoring Order   []Yes   []No     IV drips   []Yes    Drip:                            Dose:  Drip:                            Dose:  Drip:                            Dose:   []No     GI Prophylaxis   []Yes   []No         DVT Prophylaxis   SCDs:  Sequential Compression Device: Bilateral     Patient Refused VTE Prophylaxis: Yes    Haroon stockings:         [] Medication   []Contraindicated   []None      Activity Level Activity Level: Up with Assistance     Activity Assistance: Partial (one person)   Purposeful Rounding every 1-2 hour? []Yes   Ogden Score  Total Score: 2   Bed Alarm (If score 3 or >)   []Yes   [] Refused (See signed refusal form in chart)   Jeffrey Score  Jeffrey Score: 20   Jeffrey Score (if score 14 or less)   []PMT consult   []Wound Care consult      []Specialty bed   [] Nutrition consult          Needs prior to discharge:   Home O2 required:    []Yes   []No    If yes, how much O2 required?     Other:    Last Bowel Movement: Last Bowel Movement Date: 07/10/18      Influenza Vaccine Received Flu Vaccine for Current Season (usually Sept-March): Not Flu Season        Pneumonia Vaccine           Diet Active Orders   Diet    DIET CARDIAC Regular      LDAs               Peripheral IV 07/09/18 Left Arm (Active)   Site Assessment Clean, dry, & intact 7/11/2018  4:33 AM   Phlebitis Assessment 0 7/11/2018  4:33 AM   Infiltration Assessment 0 7/11/2018  4:33 AM   Dressing Status Loose 7/11/2018  4:33 AM   Dressing Type Transparent 7/11/2018  4:33 AM   Hub Color/Line Status Pink 7/11/2018  4:33 AM   Action Taken Dressing reinforced 7/11/2018  4:33 AM                      Urinary Catheter      Intake & Output   Date 07/10/18 0700 - 07/11/18 0659 07/11/18 0700 - 07/12/18 0659   Shift 9111-3304 4036-0113 24 Hour Total 2018-5003 0577-4467 24 Hour Total   I  N  T  A  K  E   P.O. 240  240         P. O. 240  240       Shift Total  (mL/kg) 240  (1.2)  240  (1.2)      O  U  T  P  U  T   Urine  (mL/kg/hr) 1150  (0.5)  1150         Urine Voided 1150  1150         Urine Occurrence(s) 2 x 1 x 3 x       Stool            Stool Occurrence(s) 2 x  2 x       Shift Total  (mL/kg) 1150  (6)  1150  (6)      NET -910  -910      Weight (kg) 192.5 192.5 192.5 192.5 192.5 192.5         Readmission Risk Assessment Tool Score High Risk            27       Total Score        3 Has Seen PCP in Last 6 Months (Yes=3, No=0)    9 IP Visits Last 12 Months (1-3=4, 4=9, >4=11)    9 Pt. Coverage (Medicare=5 , Medicaid, or Self-Pay=4)    6 Charlson Comorbidity Score (Age + Comorbid Conditions)        Criteria that do not apply:    . Living with Significant Other. Assisted Living. LTAC. SNF.  or   Rehab    Patient Length of Stay (>5 days = 3)       Expected Length of Stay 3d 16h   Actual Length of Stay 2

## 2018-07-11 NOTE — PROGRESS NOTES
Asked to see for right breast mass. Patient states she has not had mammogram in many years. Noticed mass a few weeks ago. On exam, large pendulous breast with palpable mass behind areola. No other masses palpable. No axillary LAD palpable. AP/ Right breast mass. Needs mammogram.  Possible US. Will likely need biopsy. All of this can be done on an outpatient basis. She should be referred to [facility] breast center. Thanks.     Ricky Greenfield MD

## 2018-07-11 NOTE — PROGRESS NOTES
Hospitalist Progress Note NAME: Miesha Pruitt :  1970 MRN:  [id number] Assessment / Plan: 
Acute on chronic hypercarbic respiratory failure due to acute COPD exacerbation Obstructive sleep apnea Wean  Solu-Medrol, cont  DuoNeb and azithromycin.  on Mucinex. Continue home inhalers.  Continue home CPAP. Appreciate pulmonary help Cont nasal cannula and off bipap Diastolic congestive heart failure compensated Cont Lasix, metoprolol and change doses per her home dose Rt breast mass Lauren Hendrix Diabetes mellitus uncontrolled with hyperglycemia Hypertension Dyslipidemia Insulin adjusted due to high sugars and cont poc's Resume metoprolol Resume statin Morbid obesity Chronic dermatitis with keratosis of skin Consult wound care 
  
 
 
Body mass index is 68.71 kg/(m^2). Code status: Full Prophylaxis: Hep SQ Recommended Disposition: Home w/Family Subjective: Chief Complaint / Reason for Physician Visit Reports that breathing is better. Sugars are higher. Has some back pain but controlled Review of Systems: 
Symptom Y/N Comments  Symptom Y/N Comments Fever/Chills n   Chest Pain n   
Poor Appetite    Edema Cough y   Abdominal Pain Sputum    Joint Pain SOB/RAZO y   Pruritis/Rash Nausea/vomit    Tolerating PT/OT Diarrhea    Tolerating Diet Constipation    Other Could NOT obtain due to:   
 
Objective: VITALS:  
Last 24hrs VS reviewed since prior progress note. Most recent are: 
Patient Vitals for the past 24 hrs: 
 Temp Pulse Resp BP SpO2  
18 1341 - - - - 97 % 18 1211 98 °F (36.7 °C) 69 18 (!) 135/92 90 % 18 1137 - 74 - - 99 % 18 0800 - 71 - - 96 % 18 0756 98 °F (36.7 °C) 72 18 (!) 165/102 96 % 18 0738 - - - - 97 % 18 0433 98.2 °F (36.8 °C) 72 18 124/65 97 % 18 0430 - - - - 97 % 07/10/18 2348 - - - - 96 % 07/10/18 2243 98.7 °F (37.1 °C) 80 18 118/53 96 % 07/10/18 1947 97.9 °F (36.6 °C) 79 16 121/60 99 % 07/10/18 1604 97.3 °F (36.3 °C) 75 18 116/62 98 % 07/10/18 1504 - - - - 98 % Intake/Output Summary (Last 24 hours) at 07/11/18 1421 Last data filed at 07/11/18 1251 Gross per 24 hour Intake              860 ml Output              700 ml Net              160 ml PHYSICAL EXAM: 
General: cooperative, no acute distress, morbid obesity EENT:  EOMI. Anicteric sclerae. MMM Resp:  Poor air entry, wheezing +, no crackles CV:  Regular  rhythm,  No edema GI:  Soft, Non distended, Non tender.  +Bowel sounds Neurologic:  Alert and oriented X 3, normal speech, Psych:   Not anxious nor agitated Skin:  Chronic dermatitis and keratosis of skin Reviewed most current lab test results and cultures  YES Reviewed most current radiology test results   YES Review and summation of old records today    NO Reviewed patient's current orders and MAR    YES 
PMH/SH reviewed - no change compared to H&P Current Facility-Administered Medications:  
  methylPREDNISolone (PF) (SOLU-MEDROL) injection 40 mg, 40 mg, IntraVENous, BID, Renetta Pedersen MD 
  insulin NPH (NOVOLIN N, HUMULIN N) injection 15 Units, 15 Units, SubCUTAneous, BID, Renetta Pedersen MD 
  [START ON 7/12/2018] insulin glargine (LANTUS) injection 25 Units, 25 Units, SubCUTAneous, DAILY, Renetta Pedersen MD 
  furosemide (LASIX) tablet 40 mg, 40 mg, Oral, BID, Renetta Pedersen MD 
  metoprolol succinate (TOPROL-XL) XL tablet 25 mg, 25 mg, Oral, Q12H, Renetta Pedersen MD 
  oxyCODONE IR (ROXICODONE) tablet 5 mg, 5 mg, Oral, Q6H PRN, Renetta Pedersen MD, 5 mg at 07/11/18 1249 
  albuterol (PROVENTIL HFA, VENTOLIN HFA, PROAIR HFA) inhaler 1 Puff, 1 Puff, Inhalation, Q6H PRN, Astrid Almazan, NP 
  miconazole (SECURA) 2 % extra thick cream, , Topical, BID PRN, Renetta Pedersen MD 
  albuterol-ipratropium (DUO-NEB) 2.5 MG-0.5 MG/3 ML, 3 mL, Nebulization, Q6H RT, Kody Foster MD, 3 mL at 07/11/18 1341 
  sodium hypochlorite (QUARTER STRENGTH DAKIN'S) 0.125% irrigation (bottle), , Topical, BID, Slick Shannon MD 
  ammonium lactate (LAC-HYDRIN) 12 % lotion, , Topical, BID, Slick Shannon MD 
  albuterol (PROVENTIL VENTOLIN) nebulizer solution 2.5 mg, 2.5 mg, Nebulization, Q4H PRN, Jeyson Dempsey MD 
  aspirin chewable tablet 81 mg, 81 mg, Oral, DAILY, Jeyson Dempsey MD, 81 mg at 07/11/18 4626   cetirizine (ZYRTEC) tablet 10 mg, 10 mg, Oral, DAILY PRN, Jeyson Dempsey MD, 10 mg at 07/10/18 2401   fluticasone (FLONASE) 50 mcg/actuation nasal spray 2 Spray, 2 Spray, Both Nostrils, DAILY, Jeyson Dempsey MD, 2 [place] at 07/11/18 7550   pravastatin (PRAVACHOL) tablet 40 mg, 40 mg, Oral, QHS, Jeyson Dempsey MD, 40 mg at 07/10/18 2317 
  nitroglycerin (NITROSTAT) tablet 0.4 mg, 0.4 mg, SubLINGual, Q5MIN PRN, Jeyson Dempsey MD 
  sodium chloride (NS) flush 5-10 mL, 5-10 mL, IntraVENous, Q8H, Jeyson Dempsey MD, 10 mL at 07/11/18 1248   sodium chloride (NS) flush 5-10 mL, 5-10 mL, IntraVENous, PRN, Jeyson Dempsey MD, 10 mL at 07/10/18 1424   acetaminophen (TYLENOL) tablet 650 mg, 650 mg, Oral, Q6H PRN, Jeyson Dempsey MD 
  ondansetron (ZOFRAN) injection 4 mg, 4 mg, IntraVENous, Q6H PRN, Jeyson Dempsey MD 
  docusate sodium (COLACE) capsule 100 mg, 100 mg, Oral, DAILY PRN, Jeyson Dempsey MD 
  heparin (porcine) injection 5,000 Units, 5,000 Units, SubCUTAneous, Q8H, Jeyson Dempsey MD, 5,000 Units at 07/11/18 0830 
  insulin lispro (HUMALOG) injection, , SubCUTAneous, AC&HS, Jeyson Dempsey MD, 14 Units at 07/11/18 1247 
  glucose chewable tablet 16 g, 4 Tab, Oral, PRN, Jeyson Dempsey MD 
  dextrose (D50W) injection syrg 12.5-25 g, 12.5-25 g, IntraVENous, PRN, Jeyson Dempsey MD 
  glucagon (GLUCAGEN) injection 1 mg, 1 mg, IntraMUSCular, PRN, Jeyson Dempsey MD 
  azithromycin (ZITHROMAX) 500 mg in 0.9% sodium chloride (MBP/ADV) 250 mL, 500 mg, IntraVENous, Q24H, Jeyson Dempsey MD, Last Rate: 250 mL/hr at 07/11/18 1218, 500 mg at 07/11/18 1218   hydrOXYzine HCl (ATARAX) tablet 50 mg, 50 mg, Oral, Q8H PRN, Kailyn Gross MD 
  guaiFENesin ER (MUCINEX) tablet 600 mg, 600 mg, Oral, Q12H, Kailyn Gross MD, 600 mg at 07/11/18 0830   cyclobenzaprine (FLEXERIL) tablet 5 mg, 5 mg, Oral, BID, Farzana Nowak MD, 5 mg at 07/11/18 2495 
  nystatin (MYCOSTATIN) 100,000 unit/gram cream, , Topical, BID, Farzana Nowak MD 
  sodium chloride (NS) flush 5-10 mL, 5-10 mL, IntraVENous, PRN, Hemanth Newell MD 
________________________________________________________________________ Care Plan discussed with: 
  Comments Patient y Family RN y   
Care Manager Consultant  y Dr Jordyn Butler Multidiciplinary team rounds were held today with , nursing, pharmacist and clinical coordinator. Patient's plan of care was discussed; medications were reviewed and discharge planning was addressed. ________________________________________________________________________ Total NON critical care TIME: 35    Minutes Total CRITICAL CARE TIME Spent:   Minutes non procedure based Comments >50% of visit spent in counseling and coordination of care    
________________________________________________________________________ Kailyn Gross MD  
 
Procedures: see electronic medical records for all procedures/Xrays and details which were not copied into this note but were reviewed prior to creation of Plan. LABS: 
I reviewed today's most current labs and imaging studies. Pertinent labs include: 
Recent Labs  
   07/11/18 
 0442  07/10/18 
 0353 WBC  13.9*  13.1* HGB  10.7*  10.8* HCT  34.7*  34.9*  
PLT  160  147* Recent Labs  
   07/11/18 
 6822  07/10/18 
 0353  07/09/18 
 0315  07/09/18 
 0006 NA  134*  135*   --   138  
K  4.5  4.3   --   4.4 CL  98  97   --   100 CO2  32  32   --   34* GLU  353*  298*   --   140* BUN  22*  15   --   15  
CREA  0.95  0.93 --   0.82 CA  9.9  10.4*   --   10.7* ALB   --    --    --   3.2* TBILI   --    --    --   0.3 SGOT   --    --    --   20 ALT   --    --    --   23 INR   --    --   1.0   --   
 
 
Signed: Kota Garcia MD

## 2018-07-11 NOTE — DIABETES MGMT
DTC Progress Note Recommendations/ Comments: Noted NPH increased and steroids decreased. Will continue to follow as needed. Chart reviewed on [place]. Patient is a 57 y.o. female with known Type 2 Diabetes on glyburide 5mg ac b and lantus 15 units at home. A1c:  
Lab Results Component Value Date/Time Hemoglobin A1c 5.3 07/25/2017 05:30 AM  
 Hemoglobin A1c 7.1 (H) 10/28/2016 04:25 AM  
 
 
Recent Glucose Results:  
Lab Results Component Value Date/Time  (H) 07/11/2018 04:42 AM  
 GLUCPOC 416 (H) 07/11/2018 11:40 AM  
 GLUCPOC 400 (H) 07/11/2018 07:24 AM  
 GLUCPOC 352 (H) 07/11/2018 06:48 AM  
  
 
Lab Results Component Value Date/Time Creatinine 0.95 07/11/2018 04:42 AM  
 
CrCl cannot be calculated (Unknown ideal weight. ). Active Orders Diet DIET DIABETIC CONSISTENT CARB Regular PO intake:  
Patient Vitals for the past 72 hrs: 
 % Diet Eaten  
07/11/18 1251 (P) 100 % 07/11/18 0843 100 % 07/10/18 0800 100 % 07/09/18 1800 100 % Current hospital DM medication:  
-humalog correction 
-NPH 15 units two times daily Will continue to follow as needed. Thank you Lane Da Silva RD, CDE [facility] Office: [phone]

## 2018-07-11 NOTE — PROGRESS NOTES
Spoke to Dr Arjun Sommer re pt needs. Pain is uncontrolled and pt also take lasix and Toprol twice a day. MD came to assess pt.

## 2018-07-12 PROBLEM — I47.29 NSVT (NONSUSTAINED VENTRICULAR TACHYCARDIA): Status: ACTIVE | Noted: 2018-07-12

## 2018-07-12 LAB
ANION GAP SERPL CALC-SCNC: 4 MMOL/L (ref 5–15)
BUN SERPL-MCNC: 22 MG/DL (ref 6–20)
BUN/CREAT SERPL: 24 (ref 12–20)
CALCIUM SERPL-MCNC: 9.6 MG/DL (ref 8.5–10.1)
CHLORIDE SERPL-SCNC: 97 MMOL/L (ref 97–108)
CO2 SERPL-SCNC: 33 MMOL/L (ref 21–32)
CREAT SERPL-MCNC: 0.9 MG/DL (ref 0.55–1.02)
ERYTHROCYTE [DISTWIDTH] IN BLOOD BY AUTOMATED COUNT: 13 % (ref 11.5–14.5)
GLUCOSE BLD STRIP.AUTO-MCNC: 265 MG/DL (ref 65–100)
GLUCOSE BLD STRIP.AUTO-MCNC: 320 MG/DL (ref 65–100)
GLUCOSE BLD STRIP.AUTO-MCNC: 327 MG/DL (ref 65–100)
GLUCOSE BLD STRIP.AUTO-MCNC: 339 MG/DL (ref 65–100)
GLUCOSE SERPL-MCNC: 309 MG/DL (ref 65–100)
HCT VFR BLD AUTO: 35.4 % (ref 35–47)
HGB BLD-MCNC: 11 G/DL (ref 11.5–16)
MAGNESIUM SERPL-MCNC: 2.2 MG/DL (ref 1.6–2.4)
MCH RBC QN AUTO: 29 PG (ref 26–34)
MCHC RBC AUTO-ENTMCNC: 31.1 G/DL (ref 30–36.5)
MCV RBC AUTO: 93.4 FL (ref 80–99)
NRBC # BLD: 0 K/UL (ref 0–0.01)
NRBC BLD-RTO: 0 PER 100 WBC
PLATELET # BLD AUTO: 168 K/UL (ref 150–400)
PMV BLD AUTO: 11.1 FL (ref 8.9–12.9)
POTASSIUM SERPL-SCNC: 4.6 MMOL/L (ref 3.5–5.1)
RBC # BLD AUTO: 3.79 M/UL (ref 3.8–5.2)
SERVICE CMNT-IMP: ABNORMAL
SODIUM SERPL-SCNC: 134 MMOL/L (ref 136–145)
WBC # BLD AUTO: 10.7 K/UL (ref 3.6–11)

## 2018-07-12 PROCEDURE — 74011000250 HC RX REV CODE- 250: Performed by: INTERNAL MEDICINE

## 2018-07-12 PROCEDURE — 74011636637 HC RX REV CODE- 636/637: Performed by: INTERNAL MEDICINE

## 2018-07-12 PROCEDURE — 94660 CPAP INITIATION&MGMT: CPT

## 2018-07-12 PROCEDURE — 74011250636 HC RX REV CODE- 250/636: Performed by: INTERNAL MEDICINE

## 2018-07-12 PROCEDURE — 65660000000 HC RM CCU STEPDOWN

## 2018-07-12 PROCEDURE — 94640 AIRWAY INHALATION TREATMENT: CPT

## 2018-07-12 PROCEDURE — 74011250637 HC RX REV CODE- 250/637: Performed by: INTERNAL MEDICINE

## 2018-07-12 PROCEDURE — 83735 ASSAY OF MAGNESIUM: CPT | Performed by: NURSE PRACTITIONER

## 2018-07-12 PROCEDURE — 36415 COLL VENOUS BLD VENIPUNCTURE: CPT | Performed by: INTERNAL MEDICINE

## 2018-07-12 PROCEDURE — 80048 BASIC METABOLIC PNL TOTAL CA: CPT | Performed by: INTERNAL MEDICINE

## 2018-07-12 PROCEDURE — 74011250637 HC RX REV CODE- 250/637: Performed by: HOSPITALIST

## 2018-07-12 PROCEDURE — 82962 GLUCOSE BLOOD TEST: CPT

## 2018-07-12 PROCEDURE — 77010033678 HC OXYGEN DAILY

## 2018-07-12 PROCEDURE — 74011250637 HC RX REV CODE- 250/637: Performed by: NURSE PRACTITIONER

## 2018-07-12 PROCEDURE — 85027 COMPLETE CBC AUTOMATED: CPT | Performed by: INTERNAL MEDICINE

## 2018-07-12 RX ORDER — PREDNISONE 20 MG/1
20 TABLET ORAL
Status: DISCONTINUED | OUTPATIENT
Start: 2018-07-13 | End: 2018-07-13 | Stop reason: HOSPADM

## 2018-07-12 RX ORDER — PREDNISONE 20 MG/1
40 TABLET ORAL
Status: DISCONTINUED | OUTPATIENT
Start: 2018-07-12 | End: 2018-07-12

## 2018-07-12 RX ADMIN — GUAIFENESIN 600 MG: 600 TABLET, EXTENDED RELEASE ORAL at 09:00

## 2018-07-12 RX ADMIN — NYSTATIN: 100000 CREAM TOPICAL at 09:04

## 2018-07-12 RX ADMIN — HEPARIN SODIUM 5000 UNITS: 5000 INJECTION, SOLUTION INTRAVENOUS; SUBCUTANEOUS at 08:59

## 2018-07-12 RX ADMIN — CYCLOBENZAPRINE HYDROCHLORIDE 5 MG: 10 TABLET, FILM COATED ORAL at 21:31

## 2018-07-12 RX ADMIN — Medication 10 ML: at 14:04

## 2018-07-12 RX ADMIN — Medication 10 ML: at 05:53

## 2018-07-12 RX ADMIN — FUROSEMIDE 40 MG: 40 TABLET ORAL at 17:04

## 2018-07-12 RX ADMIN — OXYCODONE HYDROCHLORIDE 5 MG: 5 TABLET ORAL at 12:01

## 2018-07-12 RX ADMIN — MICONAZOLE NITRATE: 20 CREAM TOPICAL at 09:05

## 2018-07-12 RX ADMIN — PRAVASTATIN SODIUM 40 MG: 40 TABLET ORAL at 21:30

## 2018-07-12 RX ADMIN — Medication: at 09:01

## 2018-07-12 RX ADMIN — HEPARIN SODIUM 5000 UNITS: 5000 INJECTION, SOLUTION INTRAVENOUS; SUBCUTANEOUS at 15:54

## 2018-07-12 RX ADMIN — NYSTATIN: 100000 CREAM TOPICAL at 21:47

## 2018-07-12 RX ADMIN — OXYCODONE HYDROCHLORIDE 5 MG: 5 TABLET ORAL at 06:01

## 2018-07-12 RX ADMIN — IPRATROPIUM BROMIDE AND ALBUTEROL SULFATE 3 ML: .5; 3 SOLUTION RESPIRATORY (INHALATION) at 13:35

## 2018-07-12 RX ADMIN — INSULIN LISPRO 5 UNITS: 100 INJECTION, SOLUTION INTRAVENOUS; SUBCUTANEOUS at 21:31

## 2018-07-12 RX ADMIN — SODIUM HYPOCHLORITE: 1.25 SOLUTION TOPICAL at 09:01

## 2018-07-12 RX ADMIN — INSULIN LISPRO 10 UNITS: 100 INJECTION, SOLUTION INTRAVENOUS; SUBCUTANEOUS at 17:04

## 2018-07-12 RX ADMIN — METOPROLOL SUCCINATE 25 MG: 25 TABLET, EXTENDED RELEASE ORAL at 09:00

## 2018-07-12 RX ADMIN — HUMAN INSULIN 15 UNITS: 100 INJECTION, SUSPENSION SUBCUTANEOUS at 08:41

## 2018-07-12 RX ADMIN — MICONAZOLE NITRATE: 20 CREAM TOPICAL at 21:42

## 2018-07-12 RX ADMIN — METOPROLOL SUCCINATE 25 MG: 25 TABLET, EXTENDED RELEASE ORAL at 21:31

## 2018-07-12 RX ADMIN — FLUTICASONE PROPIONATE 2 SPRAY: 50 SPRAY, METERED NASAL at 08:47

## 2018-07-12 RX ADMIN — Medication 10 ML: at 21:32

## 2018-07-12 RX ADMIN — ALBUTEROL SULFATE 1 PUFF: 90 AEROSOL, METERED RESPIRATORY (INHALATION) at 19:51

## 2018-07-12 RX ADMIN — METHYLPREDNISOLONE SODIUM SUCCINATE 40 MG: 40 INJECTION, POWDER, FOR SOLUTION INTRAMUSCULAR; INTRAVENOUS at 08:43

## 2018-07-12 RX ADMIN — IPRATROPIUM BROMIDE AND ALBUTEROL SULFATE 3 ML: .5; 3 SOLUTION RESPIRATORY (INHALATION) at 08:52

## 2018-07-12 RX ADMIN — FUROSEMIDE 40 MG: 40 TABLET ORAL at 09:00

## 2018-07-12 RX ADMIN — IPRATROPIUM BROMIDE AND ALBUTEROL SULFATE 3 ML: .5; 3 SOLUTION RESPIRATORY (INHALATION) at 01:59

## 2018-07-12 RX ADMIN — CYCLOBENZAPRINE HYDROCHLORIDE 5 MG: 10 TABLET, FILM COATED ORAL at 09:00

## 2018-07-12 RX ADMIN — IPRATROPIUM BROMIDE AND ALBUTEROL SULFATE 3 ML: .5; 3 SOLUTION RESPIRATORY (INHALATION) at 19:17

## 2018-07-12 RX ADMIN — INSULIN LISPRO 7 UNITS: 100 INJECTION, SOLUTION INTRAVENOUS; SUBCUTANEOUS at 08:42

## 2018-07-12 RX ADMIN — ASPIRIN 81 MG 81 MG: 81 TABLET ORAL at 08:59

## 2018-07-12 RX ADMIN — AZITHROMYCIN MONOHYDRATE 500 MG: 500 INJECTION, POWDER, LYOPHILIZED, FOR SOLUTION INTRAVENOUS at 11:58

## 2018-07-12 RX ADMIN — OXYCODONE HYDROCHLORIDE 5 MG: 5 TABLET ORAL at 19:51

## 2018-07-12 RX ADMIN — Medication: at 21:38

## 2018-07-12 RX ADMIN — GUAIFENESIN 600 MG: 600 TABLET, EXTENDED RELEASE ORAL at 21:31

## 2018-07-12 RX ADMIN — HEPARIN SODIUM 5000 UNITS: 5000 INJECTION, SOLUTION INTRAVENOUS; SUBCUTANEOUS at 00:45

## 2018-07-12 RX ADMIN — INSULIN GLARGINE 25 UNITS: 100 INJECTION, SOLUTION SUBCUTANEOUS at 08:40

## 2018-07-12 RX ADMIN — INSULIN LISPRO 10 UNITS: 100 INJECTION, SOLUTION INTRAVENOUS; SUBCUTANEOUS at 11:58

## 2018-07-12 NOTE — CONSULTS
[address] [address]  [phone]        Date of  Admission: 7/8/2018 10:37 PM     Admission type:Emergency    Consult for:  Consult by: Dr Alejandro Case, NP     Subjective:     Josse Holder is a 57 y.o. female admitted for Acute respiratory failure ([facility] [address] Patient complains of  dyspnea, lower extremity edema. Denies chest pain. Notes that her breathing is better from 3 days ago.      Patient Active Problem List    Diagnosis Date Noted    NSVT (nonsustained ventricular tachycardia) ([facility] [address] 07/12/2018    Acute on chronic respiratory failure with hypoxia and hypercapnia (HCC) 01/30/2018    Acute respiratory failure ([facility] [address] 01/22/2018    Multifocal pneumonia 09/27/2017    Shortness of breath 09/24/2017    SOB (shortness of breath) 09/22/2017    Maggot infestation 07/25/2017    COPD exacerbation ([facility] [address] 10/28/2016    Cellulitis, leg 08/18/2016    CAP (community acquired pneumonia) 01/13/2016     Class: Present on Admission    Acute respiratory failure with hypoxia and hypercarbia ([facility] [address] 01/10/2016     Class: Present on Admission    SIRS due to infectious process with acute organ dysfunction ([facility] [address] 01/10/2016     Class: Acute    Cough with hemoptysis 01/10/2016     Class: Present on Admission    Lymphedema of both lower extremities 01/10/2016     Class: Chronic    Gangrenous toe ([facility] [address] 01/09/2016    Type II diabetes mellitus, uncontrolled ([facility] [address] 01/09/2016    HTN (hypertension) 42/72/3664    Diastolic CHF, chronic ([address] 01/09/2016    COPD (chronic obstructive pulmonary disease) ([facility] [address] 07/21/2015     Class: Chronic    Sepsis associated hypotension ([address] 07/21/2015    Cellulitis 05/25/2015    Tobacco abuse 01/13/2013    Obesity, morbid (more than 100 lbs over ideal weight or BMI > 40) ([address] 01/13/2013    Hypoxia 10/22/2012    Noncompliance with therapeutic plan 10/22/2012    Chest pain 10/22/2012    GERD (gastroesophageal reflux disease) 10/22/2012    S/P PTCA (percutaneous transluminal coronary angioplasty) 08/22/2012    Coronary atherosclerosis of native coronary artery 08/22/2012    [facility] 08/18/2012    NSTEMI (non-ST elevated myocardial infarction) ([facility] 08/18/2012    Acute on chronic diastolic heart failure ([facility] 08/13/2012    Malignant essential hypertension 08/04/2012    Asthma 08/04/2012     Class: Chronic    Obesity hypoventilation syndrome ([facility] [address] 08/04/2012    Dyspnea 08/04/2012    Chest pressure 08/04/2012    CHF (congestive heart failure) ([facility] 08/03/2012      Indy Salomon NP  Past Medical History:   Diagnosis Date    Arthritis     Asthma     CAD (coronary artery disease)     Congestive heart failure (HCC)     COPD (chronic obstructive pulmonary disease) [facility]     Diabetes ([facility]     Hypertension     Morbid obesity with BMI of 70 and over, adult ([facility]     NSTEMI (non-ST elevated myocardial infarction) ([facility]     SVT (supraventricular tachycardia) ([facility]       Social History     Social History    Marital status: SINGLE     Spouse name: N/A    Number of children: N/A    Years of education: N/A     Social History Main Topics    Smoking status: Current Every Day Smoker     Packs/day: 0.25     Types: Cigarettes    Smokeless tobacco: Never Used      Comment: Patient states \"I  aint smoking no more d*mn cigarettes after yesterday\" 01/26/2018    Alcohol use No    Drug use: No    Sexual activity: Not Asked     Other Topics Concern    None     Social History Narrative    ** Merged History Encounter **          No Known Allergies   Family History   Problem Relation Age of Onset    Heart Disease Mother     Heart Disease Father     Heart Disease Sister       Current Facility-Administered Medications   Medication Dose Route Frequency    predniSONE (DELTASONE) tablet 40 mg  40 mg Oral DAILY WITH BREAKFAST    [START ON 7/13/2018] insulin NPH (NOVOLIN N, HUMULIN N) injection 10 Units  10 Units SubCUTAneous DAILY    insulin glargine (LANTUS) injection 25 Units  25 Units SubCUTAneous DAILY    furosemide (LASIX) tablet 40 mg  40 mg Oral BID    metoprolol succinate (TOPROL-XL) XL tablet 25 mg  25 mg Oral Q12H    oxyCODONE IR (ROXICODONE) tablet 5 mg  5 mg Oral Q6H PRN    albuterol (PROVENTIL HFA, VENTOLIN HFA, PROAIR HFA) inhaler 1 Puff  1 Puff Inhalation Q6H PRN    miconazole (SECURA) 2 % extra thick cream   Topical BID PRN    sodium hypochlorite (QUARTER STRENGTH DAKIN'S) 0.125% irrigation (bottle)   Topical Q12H    ammonium lactate (LAC-HYDRIN) 12 % lotion   Topical Q12H    nystatin (MYCOSTATIN) 100,000 unit/gram cream   Topical Q12H    albuterol-ipratropium (DUO-NEB) 2.5 MG-0.5 MG/3 ML  3 mL Nebulization Q6H RT    albuterol (PROVENTIL VENTOLIN) nebulizer solution 2.5 mg  2.5 mg Nebulization Q4H PRN    aspirin chewable tablet 81 mg  81 mg Oral DAILY    cetirizine (ZYRTEC) tablet 10 mg  10 mg Oral DAILY PRN    fluticasone (FLONASE) 50 mcg/actuation nasal spray 2 Spray  2 Spray Both Nostrils DAILY    pravastatin (PRAVACHOL) tablet 40 mg  40 mg Oral QHS    nitroglycerin (NITROSTAT) tablet 0.4 mg  0.4 mg SubLINGual Q5MIN PRN    sodium chloride (NS) flush 5-10 mL  5-10 mL IntraVENous Q8H    sodium chloride (NS) flush 5-10 mL  5-10 mL IntraVENous PRN    acetaminophen (TYLENOL) tablet 650 mg  650 mg Oral Q6H PRN    ondansetron (ZOFRAN) injection 4 mg  4 mg IntraVENous Q6H PRN    docusate sodium (COLACE) capsule 100 mg  100 mg Oral DAILY PRN    heparin (porcine) injection 5,000 Units  5,000 Units SubCUTAneous Q8H    insulin lispro (HUMALOG) injection   SubCUTAneous AC&HS    glucose chewable tablet 16 g  4 Tab Oral PRN    dextrose (D50W) injection syrg 12.5-25 g  12.5-25 g IntraVENous PRN    glucagon (GLUCAGEN) injection 1 mg  1 mg IntraMUSCular PRN    azithromycin (ZITHROMAX) 500 mg in 0.9% sodium chloride (MBP/ADV) 250 mL  500 mg IntraVENous Q24H    hydrOXYzine HCl (ATARAX) tablet 50 mg  50 mg Oral Q8H PRN    guaiFENesin ER (MUCINEX) tablet 600 mg  600 mg Oral Q12H    cyclobenzaprine (FLEXERIL) tablet 5 mg  5 mg Oral BID    sodium chloride (NS) flush 5-10 mL  5-10 mL IntraVENous PRN         Review of Symptoms:  Constitutional: negative  Eyes: negative  Ears, nose, mouth, throat, and face: negative  Respiratory: negative  Cardiovascular: negative  Gastrointestinal: negative  Genitourinary:negative  Musculoskeletal:negative  Neurological: negative  Endocrine: negative     Subjective:      Visit Vitals    /54    Pulse 62    Temp 97.6 °F (36.4 °C)    Resp 20    Wt (!) 423 lb 1 oz (191.9 kg)    SpO2 95%    Breastfeeding No    BMI 68.28 kg/m2       Physical:  Morbidly obese  Heart: Regular, S1 WNL and S2 WNL. No S3 or S4, no m/S3/JVD, no carotid bruits   Lungs: distant due to body habitus  Abdomen: Soft, +BS, NTND   Extremities: ++edema  Neurologic: Grossly normal    Data Review:   Recent Labs      07/12/18   0359  07/11/18   0442  07/10/18   0353   WBC  10.7  13.9*  13.1*   HGB  11.0*  10.7*  10.8*   HCT  35.4  34.7*  34.9*   PLT  168  160  147*     Recent Labs      07/12/18   0359  07/11/18   0442  07/10/18   0353   NA  134*  134*  135*   K  4.6  4.5  4.3   CL  97  98  97   CO2  33*  32  32   GLU  309*  353*  298*   BUN  22*  22*  15   CREA  0.90  0.95  0.93   CA  9.6  9.9  10.4*   MG  2.2   --    --        Recent Labs      07/09/18   1625   TROIQ  <0.05         Intake/Output Summary (Last 24 hours) at 07/12/18 0913  Last data filed at 07/12/18 0601   Gross per 24 hour   Intake              860 ml   Output             1175 ml   Net             -315 ml        Cardiographics    Telemetry: sinus rhythm, ventricular rate 68. Noted 11 beat run of VT yesterday at 15:41.         Assessment:           Principal Problem:    NS[facility]onsustained [facility] tachycardia[facility] (7/12/2018)    Active Problems:    Acute respiratory failure ([address] (1/22/2018)         Plan:     Yariel Fabian is a 57 year old female admitted for Acute on chronic hypercarbic respiratory failure due to acute COPD exacerbation, Obstructive sleep apnea with hx of morbid obesity, DM, DCHF, HTN and hyeprlipidemia. She had [id number] beat VT run yesterday when going to beside commode. EF in January was 55-60%. Her breathing is improving and is asymptomatic from a cardiac standpoint. Suspect her poor respiratory status and morbid obesity are contributing to her arrhythmia. Will continue to monitor. Thank you for this interesting consultation. Park Bernardo ANP    Patient seen and examined by me with nurse practitioner. I personally performed all components of the history, physical, and medical decision making and agree with the assessment and plan with minor modifications as noted. Pt with morbid obesity, asymptomatic NSVT with a nl lvef. Restart toprol. Nothing further to do. Will sign off.     Macy Diane MD, Giovani Colin

## 2018-07-12 NOTE — PROGRESS NOTES
[facility]  Pulmonary, Critical Care, and Sleep Medicine    Patient Consult    Name: Bernice Mason MRN: [id number]   : 1970 Hospital: [facility]   Date: 2018        IMPRESSION:   · Acute on Chronic Hypercarbic and Hypoxic Respiratory Failure, pt reports that she is usually on 6L per NC. She was followed by Dr. Karri Concepcion but has not seen him for a long time. She feels that she is a little better today. She seems to be approaching her baseline. · Right breast mass was evaluated per Dr. Kalyani Pereira rec. Follow up with breast center. · Super Morbid Obesity  · Peripheral Edema, has been about the same. Near baseline per pt. · DM  · Hypertension  · Hyperlipidemia. · Hx of Heart failure, Diastolic Dysfunction. NO mention of Right sided heart dysfunction. · Mild Anemia  · ? ?GEN on home CPAP or bipap but has not been using it recently. Has her equipment at home. · Agree with home tomorrow. RECOMMENDATIONS:   · Agree with discharge home tomorrow am.  · Will see again as needed. Please call if any issues. · Can been seen in our office upon discharge. Ph: [phone] for follow up. · Agree with current bipap use, Make sure pt uses the BIPAP at night and prn during day. Has now bee on NC oxygen. She can return to using her oxygen, bipap at home. · Pt and OT eval. But suspect she is very debilitated at baseline. · On Nebs  · ON Azithromycin IV  · On oral prednisone. · Oxygen to keep POx over 90%  · Glycemic monitor and control especially while on steroids  · ON DVT prophylaxis dosing heparin. Subjective:     Last 24 hrs: She denies any pain in legs, back. She does have epigastric pain and nausea when she lies flat. Has occasional GERD. She does feel that her breathing is better since admission. Non productive cough. NO chest pain, no back pain. She noted some area of induration of her right breast area.  She reports she feels some epigastric discomfort. She is unable to lie her bed due pain and shortness of breath. No headaches. Has been tolerating po intake well. NO aspiration. Rest of 10 point ROS is negative for acute changes. Chief complaint: shortness of breath. Fatigue. This patient has been seen and evaluated at the request of Dr. Sarika Herrmann for above. Patient is a 57 y.o. female   Who presents for evaluation of above. Appears to have right sided heart dysfunction. Has been occurring for the last  4 days. Ni [place]on is on about 6 L of oxygen by nasal cannula at home and lately started having worsening of shortness of breath.  She reports shortness of breath is worsened with exertion with orthopnea but no PND. Ni [place] also reports chronic cough with increased quantities of phlegm mainly white or clear.  She is also having difficulty using her nebulizer machine at home. Ni [place]on has not been using her bipap machine recently. Denies chest pain, palpitations or syncope.  Denies abdominal pain or diarrhea. Has been able to eat and drink well. Has not been sleeping well at night recently. On arrival to the hospital, she was tachypneic and had a blood gas which revealed a PCO2 of 75.  She received Solu-Medrol, DuoNeb and was started on BiPAP. Since being started on treatments she was feeling better. She was seen upstairs in her room. She was tolerating bipap well.  She reports she was ready to eat her breakfast when seen this am.             Past Medical History:   Diagnosis Date    Arthritis     Asthma     CAD (coronary artery disease)     Congestive heart failure ([address]     [facility]ch[facility]     Diabetes [facility]     Hypertension     Morbid obesity with BMI of 70 and over, adult ([address]     NSTEMI (non-ST elevated myocardial infarction) ([address]     SVT (supraventricular tachycardia) ([address]       Past Surgical History:   Procedure Laterality Date    CARDIAC SURG PROCEDURE UNLIST      Stents    HX  SECTION  HX ORTHOPAEDIC      HX OTHER SURGICAL      cyst removed from back      Prior to Admission medications    Medication Sig Start Date End Date Taking? Authorizing Provider   albuterol (VENTOLIN HFA) 90 mcg/actuation inhaler Take 2 Puffs by inhalation every six (6) hours as needed for Wheezing. Yes Historical Provider   cyclobenzaprine (FLEXERIL) 5 mg tablet Take 5 mg by mouth two (2) times a day. flexeriol   Yes Historical Provider   metoprolol succinate (TOPROL-XL) 25 mg XL tablet Take  by mouth two (2) times a day. Yes Landon Raza MD   furosemide (LASIX) 40 mg tablet Take 40 mg by mouth two (2) times a day. Indications: Peripheral Edema due to Chronic Heart Failure   Yes Landon Raza MD   albuterol (PROVENTIL VENTOLIN) 2.5 mg /3 mL (0.083 %) nebulizer solution 3 mL by Nebulization route every four (4) hours as needed for Wheezing. 2/3/18  Yes Stefan Yee MD   aspirin 81 mg chewable tablet Take 81 mg by mouth daily. Yes Historical Provider   insulin glargine (LANTUS) 100 unit/mL injection 15 Units by SubCUTAneous route daily. Yes Historical Provider   hydrOXYzine pamoate (VISTARIL) 50 mg capsule Take 50 mg by mouth every eight (8) hours as needed for Itching. Indications: Pruritus of Skin   Yes Historical Provider   nystatin (MYCOSTATIN) topical cream Apply  to affected area two (2) times daily as needed for Skin Irritation or Itching. Yes Historical Provider   fluticasone (FLONASE) 50 mcg/actuation nasal spray 2 Sprays by Both Nostrils route daily. 9/9/17  Yes Blane Schaeffer. MD Sami   lovastatin (MEVACOR) 40 mg tablet Take 1 Tab by mouth nightly. 1/14/16  Yes Sunitha Philip NP   glyBURIDE (DIABETA) 5 mg tablet Take 5 mg by mouth Daily (before breakfast). Yes Historical Provider   cetirizine (ZYRTEC) 10 mg tablet Take 10 mg by mouth daily as needed for Allergies. Yes Historical Provider   ketoconazole (NIZORAL) 2 % topical cream Apply  to affected area daily.    Yes Historical Provider   ammonium lactate (LAC-HYDRIN) 12 % topical cream rub in to affected area well 11/21/14  Yes Maria Gusman MD   nitroglycerin (NITROSTAT) 0.4 mg SL tablet 1 Tab by SubLINGual route every five (5) minutes as needed for Chest Pain (call [phone] if not relieved by 3).  9/12/13  Yes Rosalva Burnham NP     No Known Allergies   Social History   Substance Use Topics    Smoking status: Current Every Day Smoker     Packs/day: 0.25     Types: Cigarettes    Smokeless tobacco: Never Used      Comment: Patient states \"I  aint smoking no more d*mn cigarettes after yesterday\" 01/26/2018    Alcohol use No      Family History   Problem Relation Age of Onset    Heart Disease Mother     Heart Disease Father     Heart Disease Sister         Current Facility-Administered Medications   Medication Dose Route Frequency    [START ON 7/13/2018] insulin NPH (NOVOLIN N, HUMULIN N) injection 10 Units  10 Units SubCUTAneous DAILY    [START ON 7/13/2018] predniSONE (DELTASONE) tablet 20 mg  20 mg Oral DAILY WITH BREAKFAST    insulin glargine (LANTUS) injection 25 Units  25 Units SubCUTAneous DAILY    furosemide (LASIX) tablet 40 mg  40 mg Oral BID    metoprolol succinate (TOPROL-XL) XL tablet 25 mg  25 mg Oral Q12H    sodium hypochlorite (QUARTER STRENGTH DAKIN'S) 0.125% irrigation (bottle)   Topical Q12H    ammonium lactate (LAC-HYDRIN) 12 % lotion   Topical Q12H    nystatin (MYCOSTATIN) 100,000 unit/gram cream   Topical Q12H    albuterol-ipratropium (DUO-NEB) 2.5 MG-0.5 MG/3 ML  3 mL Nebulization Q6H RT    aspirin chewable tablet 81 mg  81 mg Oral DAILY    fluticasone (FLONASE) 50 mcg/actuation nasal spray 2 Spray  2 Spray Both Nostrils DAILY    pravastatin (PRAVACHOL) tablet 40 mg  40 mg Oral QHS    sodium chloride (NS) flush 5-10 mL  5-10 mL IntraVENous Q8H    heparin (porcine) injection 5,000 Units  5,000 Units SubCUTAneous Q8H    insulin lispro (HUMALOG) injection   SubCUTAneous AC&HS    azithromycin (ZITHROMAX) 500 mg in 0.9% sodium chloride (MBP/ADV) 250 mL  500 mg IntraVENous Q24H    guaiFENesin ER (MUCINEX) tablet 600 mg  600 mg Oral Q12H    cyclobenzaprine (FLEXERIL) tablet 5 mg  5 mg Oral BID       Review of Systems:  A comprehensive review of systems was negative. Objective:   Vital Signs:    Visit Vitals    /62    Pulse 61    Temp 97.8 °F (36.6 °C)    Resp 20    Wt (!) 191.9 kg (423 lb 1 oz)    SpO2 97%    Breastfeeding No    BMI 68.28 kg/m2       O2 Device: Nasal cannula   O2 Flow Rate (L/min): 6 l/min   Temp (24hrs), Av.9 °F (36.6 °C), Min:97.5 °F (36.4 °C), Max:98.4 °F (36.9 °C)       Intake/Output:   Last shift:      701 - 1900  In: -   Out: 0206 [Urine:1425]  Last 3 shifts: 07/10 1901 -  07  In: 1100 [P.O.:600; I.V.:500]  Out: 1525 [Urine:1525]    Intake/Output Summary (Last 24 hours) at 18 1330  Last data filed at 18 1328   Gross per 24 hour   Intake              240 ml   Output             2600 ml   Net            -2360 ml      Physical Exam:   General:  Morbidly obese, female, Alert, cooperative, no distress, appears stated age. On [place] oxygen. Sitting up in bed. Conversant. Head:  Normocephalic, without obvious abnormality, atraumatic. Eyes:  Conjunctivae/corneas clear. PERRL, EOMs intact. Nose: Nares normal. Septum midline. Mucosa normal. No drainage or sinus tenderness. Throat: Lips, mucosa, and tongue normal. Teeth and gums normal.   Neck: Supple, symmetrical, trachea midline, no adenopathy, thyroid: no enlargment/tenderness/nodules, no carotid bruit and no JVD. Back:   Symmetric, no curvature. ROM normal.   Lungs:   Clear to auscultation bilaterally. Good air movement. Some decreased BS in bases. Chest wall:  No tenderness or deformity. Heart:  Regular rate and rhythm, S1, S2 normal, no murmur, click, rub or gallop. Abdomen:   Obese, Soft, non-tender. Bowel sounds normal. No masses,  No organomegaly.    Extremities: Extremities normal, atraumatic, no cyanosis, 3+ bilateral edema. Brawny edema. Pulses: 2+ and symmetric all extremities. Skin: Skin color, texture, turgor normal. No rashes or lesions   Lymph nodes: Cervical, supraclavicular, and axillary nodes normal.   Neurologic: Grossly nonfocal, has good strength of BUE and BLE. Psych: No anxiety or depression.       Data review:     Recent Results (from the past 24 hour(s))   GLUCOSE, POC    Collection Time: 07/11/18  4:26 PM   Result Value Ref Range    Glucose (POC) 416 (H) 65 - 100 mg/dL    Performed by Marline Saldana (PCT)    GLUCOSE, POC    Collection Time: 07/11/18  9:14 PM   Result Value Ref Range    Glucose (POC) 347 (H) 65 - 100 mg/dL    Performed by Gloria Rodriguez    CBC W/O DIFF    Collection Time: 07/12/18  3:59 AM   Result Value Ref Range    WBC 10.7 3.6 - 11.0 K/uL    RBC 3.79 (L) 3.80 - 5.20 M/uL    HGB 11.0 (L) 11.5 - 16.0 g/dL    HCT 35.4 35.0 - 47.0 %    MCV 93.4 80.0 - 99.0 FL    MCH 29.0 26.0 - 34.0 PG    MCHC 31.1 30.0 - 36.5 g/dL    RDW 13.0 11.5 - 14.5 %    PLATELET 203 993 - 202 K/uL    MPV 11.1 8.9 - 12.9 FL    NRBC 0.0 0  WBC    ABSOLUTE NRBC 0.00 0.00 - 2.59 K/uL   METABOLIC PANEL, BASIC    Collection Time: 07/12/18  3:59 AM   Result Value Ref Range    Sodium 134 (L) 136 - 145 mmol/L    Potassium 4.6 3.5 - 5.1 mmol/L    Chloride 97 97 - 108 mmol/L    CO2 33 (H) 21 - 32 mmol/L    Anion gap 4 (L) 5 - 15 mmol/L    Glucose 309 (H) 65 - 100 mg/dL    BUN 22 (H) 6 - 20 MG/DL    Creatinine 0.90 0.55 - 1.02 MG/DL    BUN/Creatinine ratio 24 (H) 12 - 20      GFR est AA >60 >60 ml/min/1.73m2    GFR est non-AA >60 >60 ml/min/1.73m2    Calcium 9.6 8.5 - 10.1 MG/DL   MAGNESIUM    Collection Time: 07/12/18  3:59 AM   Result Value Ref Range    Magnesium 2.2 1.6 - 2.4 mg/dL   GLUCOSE, POC    Collection Time: 07/12/18  7:58 AM   Result Value Ref Range    Glucose (POC) 265 (H) 65 - 100 mg/dL    Performed by Marline Saldana (PCT)    GLUCOSE, POC    Collection Time: 07/12/18 11:29 AM Result Value Ref Range    Glucose (POC) 339 (H) 65 - 100 mg/dL    Performed by Teetee Knapp (PCT)      EKG: NSR       Imaging:  I have personally reviewed the patients radiographs and have reviewed the reports:  7-8-18: COMPARISON:  April 25, 2018     FINDINGS: A portable AP radiograph of the chest was obtained at 2316 hours. The  patient is on a cardiac monitor. The lungs are hyperinflated. The lung bases  are obscured due to overlying soft tissue. The cardiac and mediastinal contours  and pulmonary vascularity are normal.  The bones and soft tissues are grossly  within normal limits.      IMPRESSION: No acute findings.         Judy Glover MD

## 2018-07-12 NOTE — PROGRESS NOTES
9:40AM  PC to pt's PCP office to ask about hospital bed that pt has concerns about. Waiting for return call. PC to [facility]. Intake confirmed that pt is receiving nursing for wound care. Resumption order sent through [employer]. 10:50AM  Referral accepted by [facility]. Information added to AVS.     1:40PM  PC from [facility] and PCP's office. Pt [facility] hospital [facility] in May but has remained dissatisfied with mattress. CM added Darline Rubio' information to AVS so pt can contact them to discuss concerns. PCP's office will contact pt at home to schedule hospital f/u appt.     5:22PM  Pt with continued requests for new hospital bed and \"different\" (meaning totally different model than the ones we are able to provide) nebulizer machine. CM provided pt with contact information for vendors and encouraged her to call with her questions and concerns. Pt requesting early d/c time. [employer] unable to accommodate bariatric transport until 11:30AM. PCS on pt's chart. Pt notified of d/c time. CM will continue to follow and assist with d/c planning.      Liz Masters MSW  Care Manager

## 2018-07-12 NOTE — PROGRESS NOTES
PCU SHIFT NURSING NOTE      Bedside and Verbal shift change report given to Julian Garcia RN (oncoming nurse) by Kaylen Shah RN (offgoing nurse). Report included the following information SBAR, Kardex, MAR and Recent Results. Shift Summary:   Bedside and Verbal shift change report given to Kaylen Shah RN (oncoming nurse) by Julian Garcia RN (offgoing nurse). Report included the following information SBAR, Kardex, MAR and Recent Results. Admission Date 7/8/2018   Admission Diagnosis Acute respiratory failure ([facility]   Consults IP CONSULT TO PULMONOLOGY  IP CONSULT TO GENERAL SURGERY  IP CONSULT TO CARDIOLOGY        Consults   [x]PT   [x]OT   []Speech   [x]Case Management      [] Palliative      Cardiac Monitoring Order   [x]Yes   []No     IV drips   []Yes    Drip:                            Dose:  Drip:                            Dose:  Drip:                            Dose:   [x]No     GI Prophylaxis   []Yes   [x]No         DVT Prophylaxis   SCDs:  Sequential Compression Device: Bilateral     Patient Refused VTE Prophylaxis: Yes    Haroon stockings:         [x] Medication   []Contraindicated   []None      Activity Level Activity Level: Up with Assistance     Activity Assistance: Partial (one person)   Purposeful Rounding every 1-2 hour? [x]Yes   Ogden Score  Total Score: 2   Bed Alarm (If score 3 or >)   []Yes   [] Refused (See signed refusal form in chart)   Jeffrey Score  Jeffrey Score: 19   Jeffrey Score (if score 14 or less)   []PMT consult   []Wound Care consult      []Specialty bed   [] Nutrition consult          Needs prior to discharge:   Home O2 required:    [x]Yes   []No    If yes, how much O2 required?  6L    Other:    Last Bowel Movement: Last Bowel Movement Date: 07/10/18      Influenza Vaccine Received Flu Vaccine for Current Season (usually Sept-March): Not Flu Season        Pneumonia Vaccine           Diet Active Orders   Diet    DIET DIABETIC CONSISTENT CARB Regular      LDAs               Peripheral IV 07/09/18 Left Arm (Active)   Site Assessment Clean, dry, & intact 7/12/2018  4:00 AM   Phlebitis Assessment 0 7/12/2018  4:00 AM   Infiltration Assessment 0 7/12/2018  4:00 AM   Dressing Status Intact 7/12/2018  4:00 AM   Dressing Type Transparent;Tape 7/12/2018  4:00 AM   Hub Color/Line Status Pink 7/12/2018  4:00 AM   Action Taken Dressing reinforced 7/11/2018  8:02 PM                      Urinary Catheter      Intake & Output   Date 07/11/18 0700 - 07/12/18 0659 07/12/18 0700 - 07/13/18 0659   Shift 0700-1859 1900-0659 24 Hour Total 0700-1859 1900-0659 24 Hour Total   I  N  T  A  K  E   P. O. 600  600         P. O. 600  600       I.V.  (mL/kg/hr) 500  (0.2)  500         Volume (azithromycin (ZITHROMAX) 500 mg in 0.9% sodium chloride (MBP/ADV) 250 mL) 500  500       Shift Total  (mL/kg) 1100  (5.7)  1100  (5.7)      O  U  T  P  U  T   Urine  (mL/kg/hr) 950  (0.4)  950         Urine Voided 950  950         Urine Occurrence(s)  1 x 1 x       Stool            Stool Occurrence(s)  1 x 1 x       Shift Total  (mL/kg) 950  (4.9)  950  (4.9)        150      Weight (kg) 193.1 193.1 193.1 193.1 193.1 193.1         Readmission Risk Assessment Tool Score High Risk            27       Total Score        3 Has Seen PCP in Last 6 Months (Yes=3, No=0)    9 IP Visits Last 12 Months (1-3=4, 4=9, >4=11)    9 Pt. Coverage (Medicare=5 , Medicaid, or Self-Pay=4)    6 Charlson Comorbidity Score (Age + Comorbid Conditions)        Criteria that do not apply:    . Living with Significant Other. Assisted Living. LTAC. SNF.  or   Rehab    Patient Length of Stay (>5 days = 3)       Expected Length of Stay 3d 16h   Actual Length of Stay 3

## 2018-07-12 NOTE — PROGRESS NOTES
Hospitalist Progress Note NAME: Robb Francisco :  1970 MRN:  [id number] Assessment / Plan: 
Acute on chronic hypercarbic respiratory failure due to acute COPD exacerbation Obstructive sleep apnea Change to po prednisone, cont  DuoNeb and azithromycin.  on Mucinex. Continue home inhalers.  Continue home CPAP. Appreciate pulmonary help Cont nasal cannula and wean as tolerated to her base line of around 5-6L. Diastolic congestive heart failure compensated Cont Lasix, metoprolol and change doses per her home dose Rt breast mass Out f/u with general surgery NSVT stable Echo is wnl. On metoprolol. No further work up per cardiology Diabetes mellitus uncontrolled with hyperglycemia Hypertension Dyslipidemia Insulin adjusted due to high sugars and cont poc's On metoprolol Resume statin Morbid obesity Chronic dermatitis with keratosis of skin Consult wound care 
  
 
 
Body mass index is 68.28 kg/(m^2). Code status: Full Prophylaxis: Hep SQ Recommended Disposition: Home w/Family Subjective: Chief Complaint / Reason for Physician Visit Sob is better. Had NSVT yesterday. Review of Systems: 
Symptom Y/N Comments  Symptom Y/N Comments Fever/Chills n   Chest Pain n   
Poor Appetite    Edema Cough y   Abdominal Pain Sputum    Joint Pain SOB/RAZO y better  Pruritis/Rash Nausea/vomit    Tolerating PT/OT Diarrhea    Tolerating Diet Constipation    Other Could NOT obtain due to:   
 
Objective: VITALS:  
Last 24hrs VS reviewed since prior progress note. Most recent are: 
Patient Vitals for the past 24 hrs: 
 Temp Pulse Resp BP SpO2  
18 0852 - - - - 95 % 18 0730 97.6 °F (36.4 °C) - 20 - 95 % 18 0357 98 °F (36.7 °C) 62 18 124/54 96 % 18 0159 - - - - 98 % 18 2220 98 °F (36.7 °C) 69 20 148/69 95 % 18 98.4 °F (36.9 °C) 70 20 113/66 96 % 18 1936 - - - - 97 % 18 1553 97.5 °F (36.4 °C) 70 18 99/78 93 % 07/11/18 1341 - - - - 97 % 07/11/18 1211 98 °F (36.7 °C) 69 18 (!) 135/92 90 % Intake/Output Summary (Last 24 hours) at 07/12/18 1207 Last data filed at 07/12/18 3080 Gross per 24 hour Intake              860 ml Output             1175 ml Net             -315 ml PHYSICAL EXAM: 
General: cooperative, no acute distress, morbid obesity EENT:  EOMI. Anicteric sclerae. MMM Resp:  Air entry better, wheezing improved CV:  Regular  rhythm,  No edema GI:  Soft, Non distended, Non tender.  +Bowel sounds Neurologic:  Alert and oriented X 3, normal speech, Psych:   Not anxious nor agitated Skin:  Chronic dermatitis and keratosis of skin Reviewed most current lab test results and cultures  YES Reviewed most current radiology test results   YES Review and summation of old records today    NO Reviewed patient's current orders and MAR    YES 
PMH/SH reviewed - no change compared to H&P Current Facility-Administered Medications:  
  [START ON 7/13/2018] insulin NPH (NOVOLIN N, HUMULIN N) injection 10 Units, 10 Units, SubCUTAneous, DAILY, Scottie Stern MD 
  [START ON 7/13/2018] predniSONE (DELTASONE) tablet 20 mg, 20 mg, Oral, DAILY WITH BREAKFAST, Jennifer Gee MD 
  insulin glargine (LANTUS) injection 25 Units, 25 Units, SubCUTAneous, DAILY, Scottie Stern MD, 25 Units at 07/12/18 0840   furosemide (LASIX) tablet 40 mg, 40 mg, Oral, BID, Scottie Stern MD, 40 mg at 07/12/18 0900 
  metoprolol succinate (TOPROL-XL) XL tablet 25 mg, 25 mg, Oral, Q12H, Scottie Stern MD, 25 mg at 07/12/18 0900 
  oxyCODONE IR (ROXICODONE) tablet 5 mg, 5 mg, Oral, Q6H PRN, Scottie Stern MD, 5 mg at 07/12/18 1201 
  albuterol (PROVENTIL HFA, VENTOLIN HFA, PROAIR HFA) inhaler 1 Puff, 1 Puff, Inhalation, Q6H PRN, Hyunsun D Ko, NP 
  miconazole (SECURA) 2 % extra thick cream, , Topical, BID PRN, Scottie Stern MD 
  sodium hypochlorite (QUARTER STRENGTH DAKIN'S) 0.125% irrigation (bottle), , Topical, Q12H, Deandra Hawk MD 
  ammonium lactate (LAC-HYDRIN) 12 % lotion, , Topical, Q12H, Deandra Hawk MD 
  nystatin (MYCOSTATIN) 100,000 unit/gram cream, , Topical, Q12H, Deandra Hawk MD 
  albuterol-ipratropium (DUO-NEB) 2.5 MG-0.5 MG/3 ML, 3 mL, Nebulization, Q6H RT, Belkys Amato MD, 3 mL at 07/12/18 5660   albuterol (PROVENTIL VENTOLIN) nebulizer solution 2.5 mg, 2.5 mg, Nebulization, Q4H PRN, Deandra Hawk MD 
  aspirin chewable tablet 81 mg, 81 mg, Oral, DAILY, Deandra Hawk MD, 81 mg at 07/12/18 0347   cetirizine (ZYRTEC) tablet 10 mg, 10 mg, Oral, DAILY PRN, Deandra Hawk MD, 10 mg at 07/10/18 7047   fluticasone (FLONASE) 50 mcg/actuation nasal spray 2 Spray, 2 Spray, Both Nostrils, DAILY, Deanrda Hawk MD, 2 Otis at 07/12/18 0847 
  pravastatin (PRAVACHOL) tablet 40 mg, 40 mg, Oral, QHS, Deandra Hawk MD, 40 mg at 07/11/18 2224   nitroglycerin (NITROSTAT) tablet 0.4 mg, 0.4 mg, SubLINGual, Q5MIN PRN, Deandra Hawk MD 
  sodium chloride (NS) flush 5-10 mL, 5-10 mL, IntraVENous, Q8H, Deandra Hawk MD, 10 mL at 07/12/18 8668   sodium chloride (NS) flush 5-10 mL, 5-10 mL, IntraVENous, PRN, Deandra Hawk MD, 10 mL at 07/10/18 1424   acetaminophen (TYLENOL) tablet 650 mg, 650 mg, Oral, Q6H PRN, Deandra Hawk MD 
  ondansetron (ZOFRAN) injection 4 mg, 4 mg, IntraVENous, Q6H PRN, Deandra Hawk MD 
  docusate sodium (COLACE) capsule 100 mg, 100 mg, Oral, DAILY PRN, Deandra Hawk MD 
  heparin (porcine) injection 5,000 Units, 5,000 Units, SubCUTAneous, Q8H, Deandra Hawk MD, 5,000 Units at 07/12/18 0859 
  insulin lispro (HUMALOG) injection, , SubCUTAneous, AC&HS, Deandra Hawk MD, 10 Units at 07/12/18 1158   glucose chewable tablet 16 g, 4 Tab, Oral, PRN, Deandra Hawk MD 
  dextrose (D50W) injection syrg 12.5-25 g, 12.5-25 g, IntraVENous, PRN, Deandra Hawk MD 
  glucagon (GLUCAGEN) injection 1 mg, 1 mg, IntraMUSCular, PRN, Clarence Bone MD 
  [facility]) 500 mg in 0.9% sodium chloride (MBP/ADV) 250 mL, 500 mg, IntraVENous, Q24H, Clarence Bone MD, Last Rate: 250 mL/hr at 07/12/18 1158, 500 mg at 07/12/18 1158   hydrOXYzine HCl (ATARAX) tablet 50 mg, 50 mg, Oral, Q8H PRN, Clarence Bone MD 
  guaiFENesin ER (MUCINEX) tablet 600 mg, 600 mg, Oral, Q12H, Clarence Bone MD, 600 mg at 07/12/18 0900   cyclobenzaprine (FLEXERIL) tablet 5 mg, 5 mg, Oral, BID, Koki Lea MD, 5 mg at 07/12/18 0900 
  sodium chloride (NS) flush 5-10 mL, 5-10 mL, IntraVENous, PRN, Nell Snyder MD 
________________________________________________________________________ Care Plan discussed with: 
  Comments Patient y Family RN y   
Care Manager Consultant Multidiciplinary team rounds were held today with , nursing, pharmacist and clinical coordinator. Patient's plan of care was discussed; medications were reviewed and discharge planning was addressed. ________________________________________________________________________ Total NON critical care TIME: 35    Minutes Total CRITICAL CARE TIME Spent:   Minutes non procedure based Comments >50% of visit spent in counseling and coordination of care    
________________________________________________________________________ Clarence Bone MD  
 
Procedures: see electronic medical records for all procedures/Xrays and details which were not copied into this note but were reviewed prior to creation of Plan. LABS: 
I reviewed today's most current labs and imaging studies. Pertinent labs include: 
Recent Labs  
   07/12/18 
 0359 07/11/18 
 0442  07/10/18 
 0353 WBC  10.7  13.9*  13.1* HGB  11.0*  10.7*  10.8* HCT  35.4  34.7*  34.9*  
PLT  168  160  147* Recent Labs  
   07/12/18 
 0359  07/11/18 
 0442  07/10/18 
 9645 NA  134*  134*  135* K  4.6  4.5  4.3 CL  97  98  97 CO2  33*  32  32 GLU  309*  353* 298*  
BUN  22*  22*  15  
CREA  0.90  0.95  0.93  
CA  9.6  9.9  10.4* MG  2.2   --    --   
 
 
Signed: Oleg Joseph MD

## 2018-07-13 VITALS
WEIGHT: 293 LBS | DIASTOLIC BLOOD PRESSURE: 87 MMHG | RESPIRATION RATE: 18 BRPM | HEART RATE: 87 BPM | BODY MASS INDEX: 68.28 KG/M2 | TEMPERATURE: 97.8 F | SYSTOLIC BLOOD PRESSURE: 138 MMHG | OXYGEN SATURATION: 94 %

## 2018-07-13 LAB
GLUCOSE BLD STRIP.AUTO-MCNC: 167 MG/DL (ref 65–100)
GLUCOSE BLD STRIP.AUTO-MCNC: 258 MG/DL (ref 65–100)
SERVICE CMNT-IMP: ABNORMAL
SERVICE CMNT-IMP: ABNORMAL

## 2018-07-13 PROCEDURE — 74011250637 HC RX REV CODE- 250/637: Performed by: HOSPITALIST

## 2018-07-13 PROCEDURE — 74011000250 HC RX REV CODE- 250: Performed by: INTERNAL MEDICINE

## 2018-07-13 PROCEDURE — 74011250637 HC RX REV CODE- 250/637: Performed by: INTERNAL MEDICINE

## 2018-07-13 PROCEDURE — 74011250636 HC RX REV CODE- 250/636: Performed by: INTERNAL MEDICINE

## 2018-07-13 PROCEDURE — 74011636637 HC RX REV CODE- 636/637: Performed by: INTERNAL MEDICINE

## 2018-07-13 PROCEDURE — 94640 AIRWAY INHALATION TREATMENT: CPT

## 2018-07-13 PROCEDURE — 82962 GLUCOSE BLOOD TEST: CPT

## 2018-07-13 PROCEDURE — 94660 CPAP INITIATION&MGMT: CPT

## 2018-07-13 PROCEDURE — 77010033678 HC OXYGEN DAILY

## 2018-07-13 RX ORDER — PREDNISONE 20 MG/1
20 TABLET ORAL
Qty: 5 TAB | Refills: 0 | Status: SHIPPED | OUTPATIENT
Start: 2018-07-14 | End: 2018-07-19

## 2018-07-13 RX ADMIN — Medication 10 ML: at 06:17

## 2018-07-13 RX ADMIN — INSULIN LISPRO 3 UNITS: 100 INJECTION, SOLUTION INTRAVENOUS; SUBCUTANEOUS at 08:21

## 2018-07-13 RX ADMIN — FUROSEMIDE 40 MG: 40 TABLET ORAL at 08:20

## 2018-07-13 RX ADMIN — SODIUM HYPOCHLORITE: 1.25 SOLUTION TOPICAL at 08:23

## 2018-07-13 RX ADMIN — Medication: at 08:24

## 2018-07-13 RX ADMIN — IPRATROPIUM BROMIDE AND ALBUTEROL SULFATE 3 ML: .5; 3 SOLUTION RESPIRATORY (INHALATION) at 03:08

## 2018-07-13 RX ADMIN — GUAIFENESIN 600 MG: 600 TABLET, EXTENDED RELEASE ORAL at 08:19

## 2018-07-13 RX ADMIN — ASPIRIN 81 MG 81 MG: 81 TABLET ORAL at 08:20

## 2018-07-13 RX ADMIN — OXYCODONE HYDROCHLORIDE 5 MG: 5 TABLET ORAL at 07:29

## 2018-07-13 RX ADMIN — PREDNISONE 20 MG: 20 TABLET ORAL at 08:21

## 2018-07-13 RX ADMIN — FLUTICASONE PROPIONATE 2 SPRAY: 50 SPRAY, METERED NASAL at 08:24

## 2018-07-13 RX ADMIN — INSULIN HUMAN 10 UNITS: 100 INJECTION, SUSPENSION SUBCUTANEOUS at 08:22

## 2018-07-13 RX ADMIN — METOPROLOL SUCCINATE 25 MG: 25 TABLET, EXTENDED RELEASE ORAL at 08:19

## 2018-07-13 RX ADMIN — INSULIN GLARGINE 25 UNITS: 100 INJECTION, SOLUTION SUBCUTANEOUS at 08:22

## 2018-07-13 RX ADMIN — INSULIN LISPRO 7 UNITS: 100 INJECTION, SOLUTION INTRAVENOUS; SUBCUTANEOUS at 11:33

## 2018-07-13 RX ADMIN — IPRATROPIUM BROMIDE AND ALBUTEROL SULFATE 3 ML: .5; 3 SOLUTION RESPIRATORY (INHALATION) at 09:21

## 2018-07-13 RX ADMIN — CYCLOBENZAPRINE HYDROCHLORIDE 5 MG: 10 TABLET, FILM COATED ORAL at 08:20

## 2018-07-13 RX ADMIN — HEPARIN SODIUM 5000 UNITS: 5000 INJECTION, SOLUTION INTRAVENOUS; SUBCUTANEOUS at 08:21

## 2018-07-13 RX ADMIN — NYSTATIN: 100000 CREAM TOPICAL at 08:24

## 2018-07-13 RX ADMIN — HEPARIN SODIUM 5000 UNITS: 5000 INJECTION, SOLUTION INTRAVENOUS; SUBCUTANEOUS at 00:08

## 2018-07-13 NOTE — PROGRESS NOTES
PCU SHIFT NURSING NOTE      Bedside shift change report given to Otis Resendez and [address] nurse) by Roseann Rivera (offgoing nurse). Report included the following information SBAR, Kardex, Intake/Output, MAR and Recent Results. Shift Summary: 0735  Pt sitting up on side of bed  On contact for ESBL  6LO2 chronic  NSR  Bariatric transportation to arrive at 1130 for discharge    1115  Pt refused last dose of IV abx prior to discharge    1200  Awaiting transportation - discharge instructions provided  Pt has all belongings        Admission Date 7/8/2018   Admission Diagnosis Acute respiratory failure ([facility] [address]   Consults IP CONSULT TO PULMONOLOGY  IP CONSULT TO GENERAL SURGERY  IP CONSULT TO CARDIOLOGY        Consults   []PT   []OT   []Speech   []Case Management      [] Palliative      Cardiac Monitoring Order   []Yes   []No     IV drips   []Yes    Drip:                            Dose:  Drip:                            Dose:  Drip:                            Dose:   []No     GI Prophylaxis   []Yes   []No         DVT Prophylaxis   SCDs:  Sequential Compression Device: Bilateral     Patient Refused VTE Prophylaxis: Yes    Haroon stockings:         [] Medication   []Contraindicated   []None      Activity Level Activity Level: Up with Assistance     Activity Assistance: Partial (one person)   Purposeful Rounding every 1-2 hour? []Yes   Ogden Score  Total Score: 2   Bed Alarm (If score 3 or >)   []Yes   [] Refused (See signed refusal form in chart)   Jeffrey Score  Jeffrey Score: 19   Jeffrey Score (if score 14 or less)   []PMT consult   []Wound Care consult      []Specialty bed   [] Nutrition consult          Needs prior to discharge:   Home O2 required:    []Yes   []No    If yes, how much O2 required?     Other:    Last Bowel Movement: Last Bowel Movement Date: 07/11/18      Influenza Vaccine Received Flu Vaccine for Current Season (usually Sept-March): Not Flu Season        Pneumonia Vaccine           Diet Active Orders   Diet DIET DIABETIC CONSISTENT CARB Regular      LDAs               Peripheral IV 07/09/18 Left Arm (Active)   Site Assessment Clean, dry, & intact 7/12/2018  8:01 PM   Phlebitis Assessment 0 7/12/2018  8:01 PM   Infiltration Assessment 0 7/12/2018  8:01 PM   Dressing Status Clean, dry, & intact 7/12/2018  8:01 PM   Dressing Type Transparent;Tape 7/12/2018  8:01 PM   Hub Color/Line Status Pink;Capped 7/12/2018  8:01 PM   Action Taken Dressing reinforced 7/11/2018  8:02 PM   Alcohol Cap Used Yes 7/12/2018  8:01 PM                      Urinary Catheter      Intake & Output   Date 07/12/18 0700 - 07/13/18 0659 07/13/18 0700 - 07/14/18 0659   Shift 0852-5643 4126-3923 24 Hour Total 3224-3127 4052-9074 24 Hour Total   I  N  T  A  K  E   P.O. 200 180 380         P. O. 200 180 380       I.V.  (mL/kg/hr) 250  (0.1)  250  (0.1)         Volume (azithromycin (ZITHROMAX) 500 mg in 0.9% sodium chloride (MBP/ADV) 250 mL) 250  250       Shift Total  (mL/kg) 450  (2.3) 180  (0.9) 630  (3.3)      O  U  T  P  U  T   Urine  (mL/kg/hr) 2075  (0.9) 900  (0.4) 2975  (0.6)         Urine Voided 2075 900 2975       Shift Total  (mL/kg) 2075  (10.8) 900  (4.7) 2975  (15.5)      NET -0614 -824 -8280      Weight (kg) 191.9 191.9 191.9 191.9 191.9 191.9         Readmission Risk Assessment Tool Score High Risk            27       Total Score        3 Has Seen PCP in Last 6 Months (Yes=3, No=0)    9 IP Visits Last 12 Months (1-3=4, 4=9, >4=11)    9 Pt. Coverage (Medicare=5 , Medicaid, or Self-Pay=4)    6 Charlson Comorbidity Score (Age + Comorbid Conditions)        Criteria that do not apply:    . Living with Significant Other. Assisted Living. LTAC. SNF.  or   Rehab    Patient Length of Stay (>5 days = 3)       Expected Length of Stay 3d 16h   Actual Length of Stay 4

## 2018-07-13 NOTE — PROGRESS NOTES
Bedside shift change report given to DELMY Hoskins RN (oncoming nurse) by Rahel Marc RN (offgoing nurse). Report included the following information SBAR, Kardex, OR Summary, Procedure Summary, Intake/Output, MAR, Recent Results, Med Rec Status and Cardiac Rhythm SR.     1940- pt refuses dressing change and cleaning to leg wound- \"I don't want it done. They are only gonna do it once a day when I'm home\"   Explain to patient the importance of following the prescribed wound care regimen.        0400-pt refused vitals- did not want to be disturbed while sleeping

## 2018-07-13 NOTE — PROGRESS NOTES
10: 27AM  D/c order noted by CM. [facility] to transport pt home at 11:30AM. PCP office will contact pt to schedule hospital f/u. [facility] to contact pt for f/u tomorrow. Vendor information provided to pt for hospital bed she has to discuss mattress concerns. Wound care arranged through [facility]. Pt refusing any  PT/OT services. Pt expressing concerns about nebulizer. Difficulty clarifying whether machine is broken or pt displeased with machine model. Pt eventually stated that she needs the kind the [employer] makes not the brand she currently has. Per CM manager, okay to provide pt with nebulizer from [facility] closet. Nebulizer delivered to pt's room. Pt ready for d/c from CM perspective.      ROSCOE Oliver  Care Manager

## 2018-07-13 NOTE — DISCHARGE SUMMARY
Hospitalist Discharge Summary     Patient ID:  Jason Michael  [id number]  5 y.o.  1970    PCP on record: Ava Kebede NP    Admit date: 7/8/2018  Discharge date and time: 7/13/2018      DISCHARGE DIAGNOSIS:    Acute on chronic hypercarbic respiratory failure due to acute COPD exacerbation  Obstructive sleep apnea  Diastolic congestive heart failure compensated  Rt breast mass Out f/u with general surgery   NSVT stable  Diabetes mellitus uncontrolled with hyperglycemia  Hypertension  Dyslipidemia  Morbid obesity  Chronic dermatitis with keratosis of skin        CONSULTATIONS:  IP CONSULT TO PULMONOLOGY  IP CONSULT TO GENERAL SURGERY  IP CONSULT TO CARDIOLOGY    Excerpted HPI from H&P of Tamiko Lyn MD:  This is a 79-year-old female with past medical history of COPD, congestive heart failure is coming to the hospital with chief complaints of shortness of breath since the last 4 days. Chela Sensing is on about 6 L of oxygen by nasal cannula at home and lately started having worsening of shortness of breath.  She reports shortness of breath is worsened with exertion with orthopnea but no PND. Chela Sensing also reports chronic cough with increased quantities of phlegm.  She is also having difficulty using her nebulizer machine at home. Gosia Griffin does not report any chest pain, palpitations or syncopal episodes.  Denies abdominal pain or diarrhea.    ______________________________________________________________________  DISCHARGE SUMMARY/HOSPITAL COURSE:  for full details see H&P, daily progress notes, labs, consult notes.      Patient was admitted to hospital and diagnosed to have acute on chronic hypercarbic respiratory failure due to COPD exacerbation and also combination of obstructive sleep apnea.  She was started on steroids, DuoNeb and also empiric antibiotics of azithromycin.  Pulmonary consultation was placed as she was in acute respiratory failure and required BiPAP.  Patient was also noted to have compensated diastolic congestive heart failure for which she was continued on her home medications of Lasix and metoprolol.  She was also noted to have right breast mass for which general surgery consultation was placed and the recommended outpatient follow-up in about 1 week's time.  She also had nonsustained ventricular tachycardia for which cardiology was consulted and had an echocardiogram which was within normal limits and the recommended continuation of metoprolol with no further workup.  She did have high blood sugars during hospitalization as she was on steroids and her insulin was adjusted accordingly.  She was provided wound care for chronic lower extremity wounds.  Today she is doing much better.  Her symptoms are much better.  Her vital signs are stable and at baseline.  Her lab work is also closer to baseline.  She was cleared by pulmonary, cardiology and wound care as well to be discharged for outpatient follow-up.  Case management was contacted to provide her with a new nebulizer machine.                          _______________________________________________________________________  Patient seen and examined by me on discharge day. Pertinent Findings:  Gen:    Not in distress  Chest: Clear lungs  CVS:   Regular rhythm. No edema  Abd:  Soft, not distended, not tender  Neuro:  Alert, awake  _______________________________________________________________________  DISCHARGE MEDICATIONS:   Discharge Medication List as of 7/13/2018  1:24 PM      START taking these medications    Details   predniSONE (DELTASONE) 20 mg tablet Take 1 Tab by mouth daily (with breakfast) for 5 days. , Print, Disp-5 Tab, R-0         CONTINUE these medications which have NOT CHANGED    Details   albuterol (VENTOLIN HFA) 90 mcg/actuation inhaler Take 2 Puffs by inhalation every six (6) hours as needed for Wheezing., Historical Med      cyclobenzaprine (FLEXERIL) 5 mg tablet Take 5 mg by mouth two (2) times a day.  flexeriol, Historical Med      metoprolol succinate (TOPROL-XL) 25 mg XL tablet Take  by mouth two (2) times a day., Historical Med      furosemide (LASIX) 40 mg tablet Take 40 mg by mouth two (2) times a day. Indications: Peripheral Edema due to Chronic Heart Failure, Historical Med      albuterol (PROVENTIL VENTOLIN) 2.5 mg /3 mL (0.083 %) nebulizer solution 3 mL by Nebulization route every four (4) hours as needed for Wheezing., Print, Disp-24 Each, R-0      aspirin 81 mg chewable tablet Take 81 mg by mouth daily. , Historical Med      insulin glargine (LANTUS) 100 unit/mL injection 15 Units by SubCUTAneous route daily. , Historical Med      hydrOXYzine pamoate (VISTARIL) 50 mg capsule Take 50 mg by mouth every eight (8) hours as needed for Itching. Indications: Pruritus of Skin, Historical Med      nystatin (MYCOSTATIN) topical cream Apply  to affected area two (2) times daily as needed for Skin Irritation or Itching., Historical Med      fluticasone (FLONASE) 50 mcg/actuation nasal spray 2 Sprays by Both Nostrils route daily. , Print, Disp-1 Bottle, R-0      lovastatin (MEVACOR) 40 mg tablet Take 1 Tab by mouth nightly., Normal, Disp-30 Tab, R-6      glyBURIDE (DIABETA) 5 mg tablet Take 5 mg by mouth Daily (before breakfast). , Historical Med      cetirizine (ZYRTEC) 10 mg tablet Take 10 mg by mouth daily as needed for Allergies. , Historical Med      ketoconazole (NIZORAL) 2 % topical cream Apply  to affected area daily. , Historical Med      ammonium lactate (LAC-HYDRIN) 12 % topical cream rub in to affected area well, Print, Disp-280 g, R-1      nitroglycerin (NITROSTAT) 0.4 mg SL tablet 1 Tab by SubLINGual route every five (5) minutes as needed for Chest Pain (call 911 if not relieved by 3). , Normal, Disp-25 Tab, R-2             My Recommended Diet, Activity, Wound Care, and follow-up labs are listed in the patient's Discharge Insturctions which I have personally completed and reviewed. _______________________________________________________________________  DISPOSITION:    Home with Family: y   Home with HH/PT/OT/RN:    SNF/LTC:    LUIS A:    OTHER:        Condition at Discharge:  Stable  _______________________________________________________________________  Follow up with:   PCP : Tatyana Barrera NP  Follow-up Information     Follow up With Details Comments [address] [facility]  This is the provider of your wound care services. [address] Tatyana Barrera NP  They will contact you to schedule a hospital follow-up appointment. [address]  This is the provider of your hospital bed. Please contact them to discuss mattress concerns. [address] On 7/15/2018 Expect a phone call from [place] to schedule a follow up visit with you in 24-48 hours. If you have questions please Contact #[phone] Visit at [facility] and  Chris Delgadillo have teamed up to make care upon discharge more convenient. A team of doctors, nurse practitioners and EMTs, that are equipped with all the tools necessary to provide advanced medical care in the comfort of your home.       Tatyana Barrera NP Schedule an appointment as soon as possible for a visit in 1 week  [address] [facility] [address] Rosalina Wray MD Schedule an appointment as soon as possible for a visit in 1 week  [address]  MOB [address]      Boy Almonte MD Schedule an appointment as soon as possible for a visit in 1 week  [address]  [phone]                Total time in minutes spent coordinating this discharge (includes going over instructions, follow-up, prescriptions, and preparing report for sign off to her PCP) : 35  minutes    Signed:  Thelma Middleton MD

## 2018-07-13 NOTE — PROGRESS NOTES
Attempted to make follow up appointment with Dr Kaci Nettles but office said that they will call and schedule appointment directly with patient.     PCP LETICIA appt scheduled with [facility] to see PT in 24-48 hours after discharge at 9:00am. Appt added to [address] CM Specialist

## 2018-07-13 NOTE — DISCHARGE INSTRUCTIONS
[address] [place] [address], [address]   [place], [address] [facility]  [phone]    Cbc and Bmp in 1 week. Cont home wound care.

## 2018-07-24 ENCOUNTER — HOSPITAL ENCOUNTER (OUTPATIENT)
Dept: MAMMOGRAPHY | Age: 48
Discharge: HOME | End: 2018-07-24
Attending: NURSE PRACTITIONER

## 2018-07-24 DIAGNOSIS — Z12.39 SCREENING BREAST EXAMINATION: ICD-10-CM

## 2018-08-13 ENCOUNTER — HOSPITAL ENCOUNTER (OUTPATIENT)
Dept: ULTRASOUND IMAGING | Age: 48
Discharge: HOME | End: 2018-08-13
Attending: NURSE PRACTITIONER
Payer: MEDICARE

## 2018-08-13 ENCOUNTER — HOSPITAL ENCOUNTER (OUTPATIENT)
Dept: MAMMOGRAPHY | Age: 48
Discharge: HOME | End: 2018-08-13
Attending: NURSE PRACTITIONER
Payer: MEDICARE

## 2018-08-13 DIAGNOSIS — R92.8 ABNORMAL MAMMOGRAM: ICD-10-CM

## 2018-08-13 PROCEDURE — 77066 DX MAMMO INCL CAD BI: CPT

## 2018-08-13 PROCEDURE — 76642 ULTRASOUND BREAST LIMITED: CPT

## 2018-08-13 NOTE — PROGRESS NOTES
Dr. Parish Ng spoke w/Ms. Donte Miller concerning recommendation for ultrasound guided right breast biopsy of mass - spoke w/April from  [address] office and obtained verbal order for biopsy and she will be faxing order. Ms. Donte Miller scheduled biopsy for Thursday, 8/16/18 @ 11am, arriving at 1030.   Ms Donte Miller denies taking any blood thinners - she remains of 4L NC during mammo and ultrasound visit

## 2018-08-19 ENCOUNTER — HOSPITAL ENCOUNTER (INPATIENT)
Age: 48
LOS: 4 days | Discharge: HOME HEALTH | DRG: 597 | End: 2018-08-23
Attending: EMERGENCY MEDICINE | Admitting: INTERNAL MEDICINE
Payer: MEDICARE

## 2018-08-19 ENCOUNTER — APPOINTMENT (OUTPATIENT)
Dept: CT IMAGING | Age: 48
DRG: 597 | End: 2018-08-19
Attending: EMERGENCY MEDICINE
Payer: MEDICARE

## 2018-08-19 ENCOUNTER — APPOINTMENT (OUTPATIENT)
Dept: CT IMAGING | Age: 48
DRG: 597 | End: 2018-08-19
Attending: INTERNAL MEDICINE
Payer: MEDICARE

## 2018-08-19 ENCOUNTER — APPOINTMENT (OUTPATIENT)
Dept: GENERAL RADIOLOGY | Age: 48
DRG: 597 | End: 2018-08-19
Attending: EMERGENCY MEDICINE
Payer: MEDICARE

## 2018-08-19 DIAGNOSIS — R10.13 EPIGASTRIC PAIN: ICD-10-CM

## 2018-08-19 DIAGNOSIS — I87.2 CHRONIC VENOUS STASIS DERMATITIS: ICD-10-CM

## 2018-08-19 DIAGNOSIS — E66.01 MORBID OBESITY (HCC): ICD-10-CM

## 2018-08-19 DIAGNOSIS — Z71.89 COUNSELING REGARDING GOALS OF CARE: ICD-10-CM

## 2018-08-19 DIAGNOSIS — J96.21 ACUTE ON CHRONIC RESPIRATORY FAILURE WITH HYPOXIA AND HYPERCAPNIA (HCC): ICD-10-CM

## 2018-08-19 DIAGNOSIS — C50.011 MALIGNANT NEOPLASM OF NIPPLE OF RIGHT BREAST IN FEMALE, UNSPECIFIED ESTROGEN RECEPTOR STATUS (HCC): ICD-10-CM

## 2018-08-19 DIAGNOSIS — J96.22 ACUTE ON CHRONIC RESPIRATORY FAILURE WITH HYPOXIA AND HYPERCAPNIA (HCC): ICD-10-CM

## 2018-08-19 DIAGNOSIS — M54.40 BILATERAL LOW BACK PAIN WITH SCIATICA, SCIATICA LATERALITY UNSPECIFIED, UNSPECIFIED CHRONICITY: ICD-10-CM

## 2018-08-19 DIAGNOSIS — J96.21 ACUTE ON CHRONIC RESPIRATORY FAILURE WITH HYPOXIA (HCC): Primary | ICD-10-CM

## 2018-08-19 PROBLEM — J96.90 RESPIRATORY FAILURE (HCC): Status: ACTIVE | Noted: 2018-08-19

## 2018-08-19 LAB
ALBUMIN SERPL-MCNC: 3.2 G/DL (ref 3.5–5)
ALBUMIN/GLOB SERPL: 0.6 {RATIO} (ref 1.1–2.2)
ALP SERPL-CCNC: 98 U/L (ref 45–117)
ALT SERPL-CCNC: 17 U/L (ref 12–78)
ANION GAP SERPL CALC-SCNC: 2 MMOL/L (ref 5–15)
ARTERIAL PATENCY WRIST A: YES
AST SERPL-CCNC: 15 U/L (ref 15–37)
ATRIAL RATE: 88 BPM
BASE EXCESS BLDA CALC-SCNC: 6.6 MMOL/L
BASOPHILS # BLD: 0 K/UL (ref 0–0.1)
BASOPHILS NFR BLD: 0 % (ref 0–1)
BDY SITE: ABNORMAL
BILIRUB SERPL-MCNC: 0.5 MG/DL (ref 0.2–1)
BNP SERPL-MCNC: 371 PG/ML (ref 0–125)
BREATHS.SPONTANEOUS ON VENT: 22
BUN SERPL-MCNC: 16 MG/DL (ref 6–20)
BUN/CREAT SERPL: 18 (ref 12–20)
CALCIUM SERPL-MCNC: 10.8 MG/DL (ref 8.5–10.1)
CALCULATED P AXIS, ECG09: 30 DEGREES
CALCULATED R AXIS, ECG10: 64 DEGREES
CALCULATED T AXIS, ECG11: 36 DEGREES
CHLORIDE SERPL-SCNC: 98 MMOL/L (ref 97–108)
CK SERPL-CCNC: 32 U/L (ref 26–192)
CO2 SERPL-SCNC: 37 MMOL/L (ref 21–32)
COMMENT, HOLDF: NORMAL
CREAT SERPL-MCNC: 0.88 MG/DL (ref 0.55–1.02)
DIAGNOSIS, 93000: NORMAL
DIFFERENTIAL METHOD BLD: NORMAL
EOSINOPHIL # BLD: 0.2 K/UL (ref 0–0.4)
EOSINOPHIL NFR BLD: 2 % (ref 0–7)
ERYTHROCYTE [DISTWIDTH] IN BLOOD BY AUTOMATED COUNT: 12.6 % (ref 11.5–14.5)
GAS FLOW.O2 O2 DELIVERY SYS: 6 L/MIN
GLOBULIN SER CALC-MCNC: 5.7 G/DL (ref 2–4)
GLUCOSE BLD STRIP.AUTO-MCNC: 205 MG/DL (ref 65–100)
GLUCOSE BLD STRIP.AUTO-MCNC: 261 MG/DL (ref 65–100)
GLUCOSE BLD STRIP.AUTO-MCNC: 272 MG/DL (ref 65–100)
GLUCOSE BLD STRIP.AUTO-MCNC: 323 MG/DL (ref 65–100)
GLUCOSE SERPL-MCNC: 123 MG/DL (ref 65–100)
HCO3 BLDA-SCNC: 34 MMOL/L (ref 22–26)
HCT VFR BLD AUTO: 39.1 % (ref 35–47)
HGB BLD-MCNC: 11.8 G/DL (ref 11.5–16)
IMM GRANULOCYTES # BLD: 0 K/UL (ref 0–0.04)
IMM GRANULOCYTES NFR BLD AUTO: 0 % (ref 0–0.5)
LYMPHOCYTES # BLD: 1.2 K/UL (ref 0.8–3.5)
LYMPHOCYTES NFR BLD: 13 % (ref 12–49)
MCH RBC QN AUTO: 29 PG (ref 26–34)
MCHC RBC AUTO-ENTMCNC: 30.2 G/DL (ref 30–36.5)
MCV RBC AUTO: 96.1 FL (ref 80–99)
MONOCYTES # BLD: 0.8 K/UL (ref 0–1)
MONOCYTES NFR BLD: 9 % (ref 5–13)
NEUTS SEG # BLD: 6.7 K/UL (ref 1.8–8)
NEUTS SEG NFR BLD: 75 % (ref 32–75)
NRBC # BLD: 0 K/UL (ref 0–0.01)
NRBC BLD-RTO: 0 PER 100 WBC
P-R INTERVAL, ECG05: 184 MS
PCO2 BLDA: 59 MMHG (ref 35–45)
PH BLDA: 7.37 [PH] (ref 7.35–7.45)
PLATELET # BLD AUTO: 176 K/UL (ref 150–400)
PMV BLD AUTO: 11.1 FL (ref 8.9–12.9)
PO2 BLDA: 79 MMHG (ref 80–100)
POTASSIUM SERPL-SCNC: 4 MMOL/L (ref 3.5–5.1)
PROT SERPL-MCNC: 8.9 G/DL (ref 6.4–8.2)
Q-T INTERVAL, ECG07: 350 MS
QRS DURATION, ECG06: 86 MS
QTC CALCULATION (BEZET), ECG08: 423 MS
RBC # BLD AUTO: 4.07 M/UL (ref 3.8–5.2)
SAMPLES BEING HELD,HOLD: NORMAL
SAO2 % BLD: 95 % (ref 92–97)
SAO2% DEVICE SAO2% SENSOR NAME: ABNORMAL
SERVICE CMNT-IMP: ABNORMAL
SODIUM SERPL-SCNC: 137 MMOL/L (ref 136–145)
SPECIMEN SITE: ABNORMAL
TROPONIN I SERPL-MCNC: <0.05 NG/ML
VENTRICULAR RATE, ECG03: 88 BPM
WBC # BLD AUTO: 9 K/UL (ref 3.6–11)

## 2018-08-19 PROCEDURE — 85025 COMPLETE CBC W/AUTO DIFF WBC: CPT | Performed by: EMERGENCY MEDICINE

## 2018-08-19 PROCEDURE — 36600 WITHDRAWAL OF ARTERIAL BLOOD: CPT | Performed by: EMERGENCY MEDICINE

## 2018-08-19 PROCEDURE — 94640 AIRWAY INHALATION TREATMENT: CPT

## 2018-08-19 PROCEDURE — 93005 ELECTROCARDIOGRAM TRACING: CPT

## 2018-08-19 PROCEDURE — 74011250636 HC RX REV CODE- 250/636: Performed by: EMERGENCY MEDICINE

## 2018-08-19 PROCEDURE — 77010033678 HC OXYGEN DAILY

## 2018-08-19 PROCEDURE — 82962 GLUCOSE BLOOD TEST: CPT

## 2018-08-19 PROCEDURE — 94760 N-INVAS EAR/PLS OXIMETRY 1: CPT

## 2018-08-19 PROCEDURE — 99285 EMERGENCY DEPT VISIT HI MDM: CPT

## 2018-08-19 PROCEDURE — 74011250636 HC RX REV CODE- 250/636: Performed by: GENERAL ACUTE CARE HOSPITAL

## 2018-08-19 PROCEDURE — 65660000000 HC RM CCU STEPDOWN

## 2018-08-19 PROCEDURE — 74011250636 HC RX REV CODE- 250/636

## 2018-08-19 PROCEDURE — 94660 CPAP INITIATION&MGMT: CPT

## 2018-08-19 PROCEDURE — 87077 CULTURE AEROBIC IDENTIFY: CPT | Performed by: INTERNAL MEDICINE

## 2018-08-19 PROCEDURE — 73700 CT LOWER EXTREMITY W/O DYE: CPT

## 2018-08-19 PROCEDURE — 74011250637 HC RX REV CODE- 250/637: Performed by: INTERNAL MEDICINE

## 2018-08-19 PROCEDURE — 71045 X-RAY EXAM CHEST 1 VIEW: CPT

## 2018-08-19 PROCEDURE — 74011000258 HC RX REV CODE- 258: Performed by: INTERNAL MEDICINE

## 2018-08-19 PROCEDURE — 84484 ASSAY OF TROPONIN QUANT: CPT | Performed by: EMERGENCY MEDICINE

## 2018-08-19 PROCEDURE — 77030013140 HC MSK NEB VYRM -A

## 2018-08-19 PROCEDURE — 74011250636 HC RX REV CODE- 250/636: Performed by: INTERNAL MEDICINE

## 2018-08-19 PROCEDURE — 36415 COLL VENOUS BLD VENIPUNCTURE: CPT | Performed by: EMERGENCY MEDICINE

## 2018-08-19 PROCEDURE — 96374 THER/PROPH/DIAG INJ IV PUSH: CPT

## 2018-08-19 PROCEDURE — 82803 BLOOD GASES ANY COMBINATION: CPT | Performed by: EMERGENCY MEDICINE

## 2018-08-19 PROCEDURE — 94761 N-INVAS EAR/PLS OXIMETRY MLT: CPT

## 2018-08-19 PROCEDURE — 74011000250 HC RX REV CODE- 250: Performed by: EMERGENCY MEDICINE

## 2018-08-19 PROCEDURE — 82550 ASSAY OF CK (CPK): CPT | Performed by: EMERGENCY MEDICINE

## 2018-08-19 PROCEDURE — 74011000250 HC RX REV CODE- 250: Performed by: INTERNAL MEDICINE

## 2018-08-19 PROCEDURE — 87205 SMEAR GRAM STAIN: CPT | Performed by: INTERNAL MEDICINE

## 2018-08-19 PROCEDURE — 87186 SC STD MICRODIL/AGAR DIL: CPT | Performed by: INTERNAL MEDICINE

## 2018-08-19 PROCEDURE — 5A09357 ASSISTANCE WITH RESPIRATORY VENTILATION, LESS THAN 24 CONSECUTIVE HOURS, CONTINUOUS POSITIVE AIRWAY PRESSURE: ICD-10-PCS | Performed by: GENERAL ACUTE CARE HOSPITAL

## 2018-08-19 PROCEDURE — 71275 CT ANGIOGRAPHY CHEST: CPT

## 2018-08-19 PROCEDURE — 83880 ASSAY OF NATRIURETIC PEPTIDE: CPT | Performed by: EMERGENCY MEDICINE

## 2018-08-19 PROCEDURE — 74011636320 HC RX REV CODE- 636/320: Performed by: EMERGENCY MEDICINE

## 2018-08-19 PROCEDURE — 74011636637 HC RX REV CODE- 636/637: Performed by: INTERNAL MEDICINE

## 2018-08-19 PROCEDURE — 77030012879 HC MSK CPAP FLL FAC PHIL -B

## 2018-08-19 PROCEDURE — 80053 COMPREHEN METABOLIC PANEL: CPT | Performed by: EMERGENCY MEDICINE

## 2018-08-19 RX ORDER — MAGNESIUM SULFATE 100 %
4 CRYSTALS MISCELLANEOUS AS NEEDED
Status: DISCONTINUED | OUTPATIENT
Start: 2018-08-19 | End: 2018-08-23 | Stop reason: HOSPADM

## 2018-08-19 RX ORDER — SODIUM CHLORIDE 0.9 % (FLUSH) 0.9 %
5-10 SYRINGE (ML) INJECTION AS NEEDED
Status: DISCONTINUED | OUTPATIENT
Start: 2018-08-19 | End: 2018-08-23 | Stop reason: HOSPADM

## 2018-08-19 RX ORDER — SODIUM CHLORIDE 0.9 % (FLUSH) 0.9 %
10 SYRINGE (ML) INJECTION
Status: COMPLETED | OUTPATIENT
Start: 2018-08-19 | End: 2018-08-19

## 2018-08-19 RX ORDER — HEPARIN SODIUM 5000 [USP'U]/ML
5000 INJECTION, SOLUTION INTRAVENOUS; SUBCUTANEOUS EVERY 8 HOURS
Status: DISCONTINUED | OUTPATIENT
Start: 2018-08-19 | End: 2018-08-23 | Stop reason: HOSPADM

## 2018-08-19 RX ORDER — CYCLOBENZAPRINE HCL 10 MG
5 TABLET ORAL 2 TIMES DAILY
Status: DISCONTINUED | OUTPATIENT
Start: 2018-08-19 | End: 2018-08-23 | Stop reason: HOSPADM

## 2018-08-19 RX ORDER — GLYBURIDE 5 MG/1
5 TABLET ORAL
Status: DISCONTINUED | OUTPATIENT
Start: 2018-08-19 | End: 2018-08-23 | Stop reason: HOSPADM

## 2018-08-19 RX ORDER — MORPHINE SULFATE 2 MG/ML
INJECTION, SOLUTION INTRAMUSCULAR; INTRAVENOUS
Status: COMPLETED
Start: 2018-08-19 | End: 2018-08-19

## 2018-08-19 RX ORDER — ALBUTEROL SULFATE 0.83 MG/ML
2.5 SOLUTION RESPIRATORY (INHALATION)
Status: DISCONTINUED | OUTPATIENT
Start: 2018-08-19 | End: 2018-08-23 | Stop reason: HOSPADM

## 2018-08-19 RX ORDER — BISACODYL 5 MG
5 TABLET, DELAYED RELEASE (ENTERIC COATED) ORAL DAILY PRN
Status: DISCONTINUED | OUTPATIENT
Start: 2018-08-19 | End: 2018-08-23 | Stop reason: HOSPADM

## 2018-08-19 RX ORDER — METOPROLOL SUCCINATE 25 MG/1
25 TABLET, EXTENDED RELEASE ORAL 2 TIMES DAILY
Status: DISCONTINUED | OUTPATIENT
Start: 2018-08-19 | End: 2018-08-23 | Stop reason: HOSPADM

## 2018-08-19 RX ORDER — AMMONIUM LACTATE 12 G/100G
LOTION TOPICAL DAILY
Status: DISCONTINUED | OUTPATIENT
Start: 2018-08-19 | End: 2018-08-21

## 2018-08-19 RX ORDER — SODIUM CHLORIDE 9 MG/ML
50 INJECTION, SOLUTION INTRAVENOUS
Status: COMPLETED | OUTPATIENT
Start: 2018-08-19 | End: 2018-08-19

## 2018-08-19 RX ORDER — INSULIN GLARGINE 100 [IU]/ML
15 INJECTION, SOLUTION SUBCUTANEOUS DAILY
Status: DISCONTINUED | OUTPATIENT
Start: 2018-08-19 | End: 2018-08-23 | Stop reason: HOSPADM

## 2018-08-19 RX ORDER — HYDROXYZINE PAMOATE 25 MG/1
50 CAPSULE ORAL
Status: DISCONTINUED | OUTPATIENT
Start: 2018-08-19 | End: 2018-08-23 | Stop reason: HOSPADM

## 2018-08-19 RX ORDER — IPRATROPIUM BROMIDE AND ALBUTEROL SULFATE 2.5; .5 MG/3ML; MG/3ML
3 SOLUTION RESPIRATORY (INHALATION) ONCE
Status: COMPLETED | OUTPATIENT
Start: 2018-08-19 | End: 2018-08-19

## 2018-08-19 RX ORDER — PRAVASTATIN SODIUM 40 MG/1
40 TABLET ORAL
Status: DISCONTINUED | OUTPATIENT
Start: 2018-08-19 | End: 2018-08-23 | Stop reason: HOSPADM

## 2018-08-19 RX ORDER — HYDROCODONE BITARTRATE AND ACETAMINOPHEN 10; 325 MG/1; MG/1
1 TABLET ORAL
Status: DISCONTINUED | OUTPATIENT
Start: 2018-08-19 | End: 2018-08-19

## 2018-08-19 RX ORDER — ONDANSETRON 2 MG/ML
4 INJECTION INTRAMUSCULAR; INTRAVENOUS
Status: DISCONTINUED | OUTPATIENT
Start: 2018-08-19 | End: 2018-08-23 | Stop reason: HOSPADM

## 2018-08-19 RX ORDER — HYDROCODONE BITARTRATE AND ACETAMINOPHEN 10; 325 MG/1; MG/1
1 TABLET ORAL
Status: DISCONTINUED | OUTPATIENT
Start: 2018-08-19 | End: 2018-08-20

## 2018-08-19 RX ORDER — CETIRIZINE HCL 10 MG
10 TABLET ORAL
Status: DISCONTINUED | OUTPATIENT
Start: 2018-08-19 | End: 2018-08-23 | Stop reason: HOSPADM

## 2018-08-19 RX ORDER — GUAIFENESIN 100 MG/5ML
81 LIQUID (ML) ORAL DAILY
Status: DISCONTINUED | OUTPATIENT
Start: 2018-08-19 | End: 2018-08-23 | Stop reason: HOSPADM

## 2018-08-19 RX ORDER — IPRATROPIUM BROMIDE AND ALBUTEROL SULFATE 2.5; .5 MG/3ML; MG/3ML
3 SOLUTION RESPIRATORY (INHALATION)
Status: DISCONTINUED | OUTPATIENT
Start: 2018-08-19 | End: 2018-08-22

## 2018-08-19 RX ORDER — VANCOMYCIN/0.9 % SOD CHLORIDE 1.5G/250ML
1500 PLASTIC BAG, INJECTION (ML) INTRAVENOUS EVERY 8 HOURS
Status: DISCONTINUED | OUTPATIENT
Start: 2018-08-19 | End: 2018-08-19

## 2018-08-19 RX ORDER — VANCOMYCIN HYDROCHLORIDE
1250 EVERY 8 HOURS
Status: DISCONTINUED | OUTPATIENT
Start: 2018-08-19 | End: 2018-08-20

## 2018-08-19 RX ORDER — GUAIFENESIN 600 MG/1
600 TABLET, EXTENDED RELEASE ORAL 2 TIMES DAILY
Status: DISCONTINUED | OUTPATIENT
Start: 2018-08-19 | End: 2018-08-23 | Stop reason: HOSPADM

## 2018-08-19 RX ORDER — DEXTROSE 50 % IN WATER (D50W) INTRAVENOUS SYRINGE
12.5-25 AS NEEDED
Status: DISCONTINUED | OUTPATIENT
Start: 2018-08-19 | End: 2018-08-23 | Stop reason: HOSPADM

## 2018-08-19 RX ORDER — ACETAMINOPHEN 325 MG/1
650 TABLET ORAL
Status: DISCONTINUED | OUTPATIENT
Start: 2018-08-19 | End: 2018-08-23 | Stop reason: HOSPADM

## 2018-08-19 RX ORDER — FUROSEMIDE 40 MG/1
40 TABLET ORAL
Status: DISCONTINUED | OUTPATIENT
Start: 2018-08-19 | End: 2018-08-22

## 2018-08-19 RX ORDER — METRONIDAZOLE 500 MG/100ML
500 INJECTION, SOLUTION INTRAVENOUS EVERY 8 HOURS
Status: DISCONTINUED | OUTPATIENT
Start: 2018-08-19 | End: 2018-08-20

## 2018-08-19 RX ORDER — SODIUM CHLORIDE 0.9 % (FLUSH) 0.9 %
5-10 SYRINGE (ML) INJECTION EVERY 8 HOURS
Status: DISCONTINUED | OUTPATIENT
Start: 2018-08-19 | End: 2018-08-23 | Stop reason: HOSPADM

## 2018-08-19 RX ORDER — MORPHINE SULFATE 2 MG/ML
2 INJECTION, SOLUTION INTRAMUSCULAR; INTRAVENOUS
Status: COMPLETED | OUTPATIENT
Start: 2018-08-19 | End: 2018-08-19

## 2018-08-19 RX ORDER — INSULIN LISPRO 100 [IU]/ML
INJECTION, SOLUTION INTRAVENOUS; SUBCUTANEOUS
Status: DISCONTINUED | OUTPATIENT
Start: 2018-08-19 | End: 2018-08-23 | Stop reason: HOSPADM

## 2018-08-19 RX ADMIN — MORPHINE SULFATE 2 MG: 2 INJECTION, SOLUTION INTRAMUSCULAR; INTRAVENOUS at 03:30

## 2018-08-19 RX ADMIN — GUAIFENESIN 600 MG: 600 TABLET, EXTENDED RELEASE ORAL at 08:18

## 2018-08-19 RX ADMIN — FUROSEMIDE 40 MG: 40 TABLET ORAL at 08:00

## 2018-08-19 RX ADMIN — VANCOMYCIN HYDROCHLORIDE 1250 MG: 10 INJECTION, POWDER, LYOPHILIZED, FOR SOLUTION INTRAVENOUS at 17:49

## 2018-08-19 RX ADMIN — PRAVASTATIN SODIUM 40 MG: 40 TABLET ORAL at 21:01

## 2018-08-19 RX ADMIN — INSULIN LISPRO 7 UNITS: 100 INJECTION, SOLUTION INTRAVENOUS; SUBCUTANEOUS at 17:49

## 2018-08-19 RX ADMIN — IPRATROPIUM BROMIDE AND ALBUTEROL SULFATE 3 ML: .5; 3 SOLUTION RESPIRATORY (INHALATION) at 02:46

## 2018-08-19 RX ADMIN — IPRATROPIUM BROMIDE AND ALBUTEROL SULFATE 3 ML: .5; 3 SOLUTION RESPIRATORY (INHALATION) at 14:00

## 2018-08-19 RX ADMIN — INSULIN LISPRO 3 UNITS: 100 INJECTION, SOLUTION INTRAVENOUS; SUBCUTANEOUS at 22:16

## 2018-08-19 RX ADMIN — METOPROLOL SUCCINATE 25 MG: 50 TABLET, EXTENDED RELEASE ORAL at 08:18

## 2018-08-19 RX ADMIN — INSULIN LISPRO 3 UNITS: 100 INJECTION, SOLUTION INTRAVENOUS; SUBCUTANEOUS at 13:04

## 2018-08-19 RX ADMIN — IPRATROPIUM BROMIDE AND ALBUTEROL SULFATE 3 ML: .5; 3 SOLUTION RESPIRATORY (INHALATION) at 23:44

## 2018-08-19 RX ADMIN — ASPIRIN 81 MG 81 MG: 81 TABLET ORAL at 08:17

## 2018-08-19 RX ADMIN — CEFEPIME HYDROCHLORIDE 2 G: 2 INJECTION, POWDER, FOR SOLUTION INTRAVENOUS at 21:01

## 2018-08-19 RX ADMIN — Medication 10 ML: at 04:13

## 2018-08-19 RX ADMIN — METRONIDAZOLE 500 MG: 500 INJECTION, SOLUTION INTRAVENOUS at 07:12

## 2018-08-19 RX ADMIN — HEPARIN SODIUM 5000 UNITS: 5000 INJECTION INTRAVENOUS; SUBCUTANEOUS at 07:11

## 2018-08-19 RX ADMIN — HEPARIN SODIUM 5000 UNITS: 5000 INJECTION INTRAVENOUS; SUBCUTANEOUS at 15:13

## 2018-08-19 RX ADMIN — METRONIDAZOLE 500 MG: 500 INJECTION, SOLUTION INTRAVENOUS at 16:33

## 2018-08-19 RX ADMIN — SODIUM CHLORIDE 50 ML/HR: 900 INJECTION, SOLUTION INTRAVENOUS at 04:12

## 2018-08-19 RX ADMIN — IOPAMIDOL 100 ML: 755 INJECTION, SOLUTION INTRAVENOUS at 04:13

## 2018-08-19 RX ADMIN — METHYLPREDNISOLONE SODIUM SUCCINATE 40 MG: 40 INJECTION, POWDER, FOR SOLUTION INTRAMUSCULAR; INTRAVENOUS at 21:01

## 2018-08-19 RX ADMIN — VANCOMYCIN HYDROCHLORIDE 2500 MG: 10 INJECTION, POWDER, LYOPHILIZED, FOR SOLUTION INTRAVENOUS at 11:13

## 2018-08-19 RX ADMIN — CEFEPIME HYDROCHLORIDE 2 G: 2 INJECTION, POWDER, FOR SOLUTION INTRAVENOUS at 08:23

## 2018-08-19 RX ADMIN — METOPROLOL SUCCINATE 25 MG: 50 TABLET, EXTENDED RELEASE ORAL at 17:49

## 2018-08-19 RX ADMIN — IPRATROPIUM BROMIDE AND ALBUTEROL SULFATE 3 ML: .5; 3 SOLUTION RESPIRATORY (INHALATION) at 08:17

## 2018-08-19 RX ADMIN — METRONIDAZOLE 500 MG: 500 INJECTION, SOLUTION INTRAVENOUS at 22:16

## 2018-08-19 RX ADMIN — Medication 10 ML: at 13:04

## 2018-08-19 RX ADMIN — Medication 10 ML: at 08:44

## 2018-08-19 RX ADMIN — INSULIN GLARGINE 15 UNITS: 100 INJECTION, SOLUTION SUBCUTANEOUS at 08:41

## 2018-08-19 RX ADMIN — Medication: at 11:13

## 2018-08-19 RX ADMIN — CYCLOBENZAPRINE HYDROCHLORIDE 5 MG: 10 TABLET, FILM COATED ORAL at 17:49

## 2018-08-19 RX ADMIN — GUAIFENESIN 600 MG: 600 TABLET, EXTENDED RELEASE ORAL at 17:50

## 2018-08-19 RX ADMIN — CYCLOBENZAPRINE HYDROCHLORIDE 5 MG: 10 TABLET, FILM COATED ORAL at 09:24

## 2018-08-19 RX ADMIN — Medication 10 ML: at 21:01

## 2018-08-19 RX ADMIN — FUROSEMIDE 40 MG: 40 TABLET ORAL at 16:33

## 2018-08-19 RX ADMIN — GLYBURIDE 5 MG: 5 TABLET ORAL at 08:42

## 2018-08-19 RX ADMIN — METHYLPREDNISOLONE SODIUM SUCCINATE 40 MG: 40 INJECTION, POWDER, FOR SOLUTION INTRAMUSCULAR; INTRAVENOUS at 08:20

## 2018-08-19 RX ADMIN — CEFEPIME HYDROCHLORIDE 2 G: 2 INJECTION, POWDER, FOR SOLUTION INTRAVENOUS at 15:13

## 2018-08-19 RX ADMIN — INSULIN LISPRO 5 UNITS: 100 INJECTION, SOLUTION INTRAVENOUS; SUBCUTANEOUS at 08:42

## 2018-08-19 RX ADMIN — HEPARIN SODIUM 5000 UNITS: 5000 INJECTION INTRAVENOUS; SUBCUTANEOUS at 22:18

## 2018-08-19 NOTE — IP AVS SNAPSHOT
[address] [facility] 
[phone] Patient: Enoch Cobian MRN: [id number] QVL:[id number][birth year]10[id number] About your hospitalization You were admitted on:  August 19, 2018 You last received care in the:  [facility] 2 CARDIOPULMONARY CARE You were discharged on:  August 23, 2018 Why you were hospitalized Your primary diagnosis was:  Respiratory Failure (Hcc) Your diagnoses also included:  Elevated Lipase, Abdominal Pain, Counseling Regarding Goals Of Care, Chf (Congestive Heart Failure) (Hcc), Malignant Essential Hypertension, Copd (Chronic Obstructive Pulmonary Disease) (Hcc), Obesity, Morbid (More Than 100 Lbs Over Ideal Weight Or Bmi > 40) (Hcc), Obesity Hypoventilation Syndrome (Hcc), Hypoxia, Cellulitis, Type Ii Diabetes Mellitus, Uncontrolled (Hcc), Acute Pancreatitis, Breast Cancer (Hcc), Intertrigo Follow-up Information Follow up With Details Comments Contact Info Brandon Menon NP Go on 8/24/2018 Hospital follow-up scheduled and office will call to schedule a time of visit ( If you have questions or need to reschedule please call [address] 
[phone] Eleuterio Chagn MD Go on 9/11/2018  [address] [facility] [address] [facility] 
[phone] Cem Angel MD Schedule an appointment as soon as possible for a visit 3 weeks; after seen by Dr. Irish Knowles [address] [facility] 
[phone] Kelli Mcdonald DPM Schedule an appointment as soon as possible for a visit 2 weeks [address] [facility] [address] [facility] 
[phone] Discharge Orders None A check edwina indicates which time of day the medication should be taken. My Medications START taking these medications Instructions Each Dose to Equal  
 Morning Noon Evening Bedtime amoxicillin-clavulanate 875-125 mg per tablet Commonly known as:  AUGMENTIN Your last dose was: Your next dose is: Take 1 Tab by mouth two (2) times daily (with meals) for 3 days. 1 Tab  
    
   
   
   
  
 docusate sodium 100 mg capsule Commonly known as:  Jeffrey Ceballos Your last dose was: Your next dose is: Take 1 Cap by mouth two (2) times a day for 30 days. 100 mg  
    
   
   
   
  
 glucose blood VI test strips strip Commonly known as:  EASYGLUCO TEST Your last dose was: Your next dose is:    
   
   
 by Does Not Apply route ACB/HS. guaiFENesin  mg ER tablet Commonly known as:  Flowbox Your last dose was: Your next dose is: Take 1 Tab by mouth two (2) times a day. 600 mg  
    
   
   
   
  
 lidocaine 5 % Commonly known as:  Bernie Hernandez Your last dose was: Your next dose is:    
   
   
 2 Patches by TransDERmal route every twenty-four (24) hours. Apply patch to the affected area for 12 hours a day and remove for 12 hours a day. 2 Patch  
    
   
   
   
  
 nystatin powder Commonly known as:  MYCOSTATIN Replaces:  nystatin topical cream  
   
Your last dose was: Your next dose is:    
   
   
 Apply  to affected area two (2) times a day. oxyCODONE IR 5 mg immediate release tablet Commonly known as:  Alex Pretty Your last dose was: Your next dose is: Take 1 Tab by mouth every four (4) hours as needed (pain 6-10). Max Daily Amount: 30 mg.  
 5 mg  
    
   
   
   
  
 polyethylene glycol 17 gram packet Commonly known as:  Beatrice Bishop Start taking on:  8/24/2018 Your last dose was: Your next dose is: Take 1 Packet by mouth daily. 17 g CONTINUE taking these medications  Instructions Each Dose to Equal  
 Morning Noon Evening Bedtime * VENTOLIN HFA 90 mcg/actuation inhaler Generic drug:  albuterol Your last dose was: Your next dose is: Take 2 Puffs by inhalation every six (6) hours as needed for Wheezing. 2 Puff * albuterol 2.5 mg /3 mL (0.083 %) nebulizer solution Commonly known as:  PROVENTIL VENTOLIN Your last dose was: Your next dose is:    
   
   
 3 mL by Nebulization route every four (4) hours as needed for Wheezing. 2.5 mg  
    
   
   
   
  
 ammonium lactate 12 % topical cream  
Commonly known as:  LAC-HYDRIN Your last dose was: Your next dose is:    
   
   
 rub in to affected area well  
     
   
   
   
  
 aspirin 81 mg chewable tablet Your last dose was: Your next dose is: Take 81 mg by mouth daily. 81 mg  
    
   
   
   
  
 cetirizine 10 mg tablet Commonly known as:  ZYRTEC Your last dose was: Your next dose is: Take 10 mg by mouth daily as needed for Allergies. 10 mg  
    
   
   
   
  
 cyclobenzaprine 5 mg tablet Commonly known as:  FLEXERIL Your last dose was: Your next dose is: Take 5 mg by mouth two (2) times a day. flexeriol 5 mg  
    
   
   
   
  
 fluticasone 50 mcg/actuation nasal spray Commonly known as:  Leana Jones Your last dose was: Your next dose is: 2 Sprays by Both Nostrils route daily. 2 Spray  
    
   
   
   
  
 glyBURIDE 5 mg tablet Commonly known as:  Jeyson Catena Your last dose was: Your next dose is: Take 5 mg by mouth Daily (before breakfast). 5 mg  
    
   
   
   
  
 hydrOXYzine pamoate 50 mg capsule Commonly known as:  VISTARIL Your last dose was: Your next dose is: Take 50 mg by mouth every eight (8) hours as needed for Itching. Indications: Pruritus of Skin  50 mg  
    
   
   
   
  
 ketoconazole 2 % topical cream  
Commonly known as:  NIZORAL Your last dose was: Your next dose is:    
   
   
 Apply  to affected area daily. LANTUS U-100 INSULIN 100 unit/mL injection Generic drug:  insulin glargine Your last dose was: Your next dose is:    
   
   
 15 Units by SubCUTAneous route daily. 15 Units LASIX 40 mg tablet Generic drug:  furosemide Your last dose was: Your next dose is: Take 40 mg by mouth two (2) times a day. Indications: Peripheral Edema due to Chronic Heart Failure 40 mg  
    
   
   
   
  
 lovastatin 40 mg tablet Commonly known as:  MEVACOR Your last dose was: Your next dose is: Take 1 Tab by mouth nightly. 40 mg  
    
   
   
   
  
 metoprolol succinate 25 mg XL tablet Commonly known as:  TOPROL-XL Your last dose was: Your next dose is: Take  by mouth two (2) times a day. nitroglycerin 0.4 mg SL tablet Commonly known as:  NITROSTAT Your last dose was: Your next dose is:    
   
   
 1 Tab by SubLINGual route every five (5) minutes as needed for Chest Pain (call 911 if not relieved by 3). 0.4 mg  
    
   
   
   
  
 * Notice: This list has 2 medication(s) that are the same as other medications prescribed for you. Read the directions carefully, and ask your doctor or other care provider to review them with you. STOP taking these medications   
 nystatin topical cream  
Commonly known as:  MYCOSTATIN Replaced by:  nystatin powder Where to Get Your Medications Information on where to get these meds will be given to you by the nurse or doctor. ! Ask your nurse or doctor about these medications  
  amoxicillin-clavulanate 875-125 mg per tablet  
 docusate sodium 100 mg capsule  
 glucose blood VI test strips strip guaiFENesin  mg ER tablet  
 ketoconazole 2 % topical cream  
 lidocaine 5 %  
 nystatin powder  
 oxyCODONE IR 5 mg immediate release tablet  
 polyethylene glycol 17 gram packet Opioid Education Prescription Opioids: What You Need to Know: 
 
Prescription opioids can be used to help relieve moderate-to-severe pain and are often prescribed following a surgery or injury, or for certain health conditions. These medications can be an important part of treatment but also come with serious risks. Opioids are strong pain medicines. Examples include hydrocodone, oxycodone, fentanyl, and morphine. Heroin is an example of an illegal opioid. It is important to work with your health care provider to make sure you are getting the safest, most effective care. WHAT ARE THE RISKS AND SIDE EFFECTS OF OPIOID USE? Prescription opioids carry serious risks of addiction and overdose, especially with prolonged use. An opioid overdose, often marked by slow breathing, can cause sudden death. The use of prescription opioids can have a number of side effects as well, even when taken as directed. · Tolerance-meaning you might need to take more of a medication for the same pain relief · Physical dependence-meaning you have symptoms of withdrawal when the medication is stopped. Withdrawal symptoms can include nausea, sweating, chills, diarrhea, stomach cramps, and muscle aches. Withdrawal can last up to several weeks, depending on which drug you took and how long you took it. · Increased sensitivity to pain · Constipation · Nausea, vomiting, and dry mouth · Sleepiness and dizziness · Confusion · Depression · Low levels of testosterone that can result in lower sex drive, energy, and strength · Itching and sweating RISKS ARE GREATER WITH:      
· History of drug misuse, substance use disorder, or overdose · Mental health conditions (such as depression or anxiety) · Sleep apnea · Older age (72 years or older) · Pregnancy Avoid alcohol while taking prescription opioids. Also, unless specifically advised by your health care provider, medications to avoid include: · Benzodiazepines (such as Xanax or Valium) · Muscle relaxants (such as Soma or Flexeril) · Hypnotics (such as Ambien or Lunesta) · Other prescription opioids KNOW YOUR OPTIONS Talk to your health care provider about ways to manage your pain that don't involve prescription opioids. Some of these options may actually work better and have fewer risks and side effects. Options may include: 
· Pain relievers such as acetaminophen, ibuprofen, and naproxen · Some medications that are also used for depression or seizures · Physical therapy and exercise · Counseling to help patients learn how to cope better with triggers of pain and stress. · Application of heat or cold compress · Massage therapy · Relaxation techniques Be Informed Make sure you know the name of your medication, how much and how often to take it, and its potential risks & side effects. IF YOU ARE PRESCRIBED OPIOIDS FOR PAIN: 
· Never take opioids in greater amounts or more often than prescribed. Remember the goal is not to be pain-free but to manage your pain at a tolerable level. · Follow up with your primary care provider to: · Work together to create a plan on how to manage your pain. · Talk about ways to help manage your pain that don't involve prescription opioids. · Talk about any and all concerns and side effects. · Help prevent misuse and abuse. · Never sell or share prescription opioids · Help prevent misuse and abuse. · Store prescription opioids in a secure place and out of reach of others (this may include visitors, children, friends, and family).  
· Safely dispose of unused/unwanted prescription opioids: Find your community drug take-back program or your pharmacy mail-back program, or flush them down the toilet, following guidance from the Food and Drug Administration (www.fda.gov/Drugs/ResourcesForYou). · Visit www.cdc.gov/drugoverdose to learn about the risks of opioid abuse and overdose. · If you believe you may be struggling with addiction, tell your health care provider and ask for guidance or call [facility] at 1-665-778-PPIB. Discharge Instructions Patient Discharge Instructions Pt Name  Carolina Alves Date of Birth 1970 Age  57 y.o. Medical Record Number  [id number] PCP Nick Palafox NP Admit date:  8/19/2018 @    [place] [address] Room Number  2290/01 Date of Discharge 8/23/2018 Admission Diagnoses:     Respiratory failure ([address] No Known Allergies You were admitted to [address] for  Respiratory failure [facility]) YOUR OTHER MEDICAL DIAGNOSES INCLUDE (BUT NOT LIMITED TO ): 
Present on Admission:  Respiratory failure ([address]  Elevated lipase  Abdominal pain  Counseling regarding goals of care  CHF (congestive heart failure) ([address]  Malignant essential hypertension  COPD (chronic obstructive pulmonary disease) (HCC)  Obesity, morbid (more than 100 lbs over ideal weight or BMI > 40) (HCC)  Obesity hypoventilation syndrome ([address]  Hypoxia  Cellulitis  Type II diabetes mellitus, uncontrolled ([address]  Acute pancreatitis  Breast cancer ([address]  Intertrigo DIET:  Diabetic Diet Recommended activity: Activity as tolerated Follow up : Follow-up Information Follow up With Details Comments Contact Info Nick Palafox NP Go on 8/24/2018 Hospital follow-up scheduled and office will call to schedule a time of visit ( If you have questions or need to reschedule please call [address] 
[phone] León Grove MD Go on 9/11/2018  [address] [facility] [address] [facility] 
[phone] Alex Nicholas MD Schedule an appointment as soon as possible for a visit 3 weeks; after seen by Dr. Eh Bledsoe [address] [facility] 
[phone] Jaki Georges DPM Schedule an appointment as soon as possible for a visit 2 weeks [address] [address] [facility] 
[phone] Bilateral leg care:  Cleanse the skin with soap and rinse well with water and dry. Immediately apply the Lac-Hydrin lotion and allow it to absorb into the skin. · It is important that you take the medication exactly as they are prescribed. · Keep your medication in the bottles provided by the pharmacist and keep a list of the medication names, dosages, and times to be taken in your wallet. · Do not take other medications without consulting your doctor. ADDITIONAL INFORMATION: If you experience any of the following symptoms or have any health problem not listed below, then please call your primary care physician or return to the emergency room if you cannot get hold of your doctor: Fever, chills, nausea, vomiting, diarrhea, change in mentation, falling, bleeding, shortness of breath. I understand that if any problems occur once I am discharged, I am supposed to call my Primary care physician for further care or seek help in the Emergency Department at the nearest Healthcare facility. I have had an opportunity to discuss my clinical issues with my doctor and nursing staff. I understand and acknowledge receipt of the above instructions. Physician's or R.N.'s Signature                                                            Date/Time Patient or Representative Signature                                                 Date/Time NumberPicture Announcement We are excited to announce that we are making your provider's discharge notes available to you in NumberPicture. You will see these notes when they are completed and signed by the physician that discharged you from your recent hospital stay. If you have any questions or concerns about any information you see in NumberPicture, please call the Health Information Department where you were seen or reach out to your Primary Care Provider for more information about your plan of care. Introducing [facility]! [employer] introduces NumberPicture patient portal. Now you can access parts of your medical record, email your doctor's office, and request medication refills online. 1. In your internet browser, go to [email or web address] 2. Click on the First Time User? Click Here link in the Sign In box. You will see the New Member Sign Up page. 3. Enter your NumberPicture Access Code exactly as it appears below. You will not need to use this code after youve completed the sign-up process. If you do not sign up before the expiration date, you must request a new code. · NumberPicture Access Code: [email or web address] Expires: 10/25/2018  5:40 PM 
 
4. Enter the last four digits of your Social Security Number (xxxx) and Date of Birth (mm/dd/yyyy) as indicated and click Submit. You will be taken to the next sign-up page. 5. Create a NumberPicture ID. This will be your NumberPicture login ID and cannot be changed, so think of one that is secure and easy to remember. 6. Create a NumberPicture password. You can change your password at any time. 7. Enter your Password Reset Question and Answer. This can be used at a later time if you forget your password. 8. Enter your e-mail address. You will receive e-mail notification when new information is available in [address] 9. Click Sign Up. You can now view and download portions of your medical record. 10. Click the Download Summary menu link to download a portable copy of your medical information. If you have questions, please visit the Frequently Asked Questions section of the [employer] website. Remember, [employer] is NOT to be used for urgent needs. For medical emergencies, dial 911. Now available from your iPhone and Android! Introducing [employer]ter As a [facility] patient, I wanted to make you aware of our electronic visit tool called [employer]. [employer] allows you to connect within minutes with a medical provider 24 hours a day, seven days a week via a mobile device or tablet or logging into a secure website from your computer. You can access Bandar Gunter from anywhere in the [place]. A virtual visit might be right for you when you have a simple condition and feel like you just dont want to get out of bed, or cant get away from work for an appointment, when your regular [facility] [employer] [facility] provider is not available (evenings, weekends or holidays), or when youre out of town and need minor care. Electronic visits cost only $49 and if the [employer] provider determines a prescription is needed to treat your condition, one can be electronically transmitted to a nearby pharmacy*. Please take a moment to enroll today if you have not already done so. The enrollment process is free and takes just a few minutes. To enroll, please download the [employer] mireya to your tablet or phone, or visit [email or web address] to enroll on your computer.    
And, as an [address] [place] [address] patient with a [employer] account, the results of your visits will be scanned into your electronic medical record and your primary care provider will be able to view the scanned results. We urge you to continue to see your regular Nicole Loud provider for your ongoing medical care. And while your primary care provider may not be the one available when you seek a [email or web address] virtual visit, the peace of mind you get from getting a real diagnosis real time can be priceless. For more information on [email or web address], view our Frequently Asked Questions (FAQs) at [email or web address]. Sincerely, 
 
Bashir Hall MD 
Chief Medical Officer [address] *:  certain medications cannot be prescribed via [email or web address] Unresulted Labs-Please follow up with your PCP about these lab tests Order Current Status CA 27.29 In process Last Palliative Care Discharge Note   
 08/23/18 3171  Version 1 of 1 Goals of Care/Treatment Preferences The Palliative Medicine team was consulted as part of your/your loved one's care in the hospital. Our team is a supportive service; we strive to relieve suffering and improve quality of life. We reviewed advance care planning information, which includes the following: 
Patient's Healthcare Decision Maker is[de-identified] Named in scanned ACP document Primary Decision Maker Name: Haresh Camp Primary Decision Maker Phone Number: [phone] Primary Decision Maker Relationship to Patient: Other relative Secondary Decision Maker Name: Arpananibal Cabrera Juanpablo Secondary Decision Maker Phone Number: [phone] Secondary Decision Maker Relationship to Patient: Adult child Confirm Advance Directive: Yes, on file Patient/Health Care Proxy Stated Goals: Prolong life We reviewed / discussed your code status as: Full Code    Full Code means perform CPR in the event of cardiac arrest. 
    DNR means do NOT perform CPR in the event of cardiac arrest. 
    Partial Code means you have specific preferences, please discuss with your healthcare team. 
 No Order means this issue was not addressed / resolved during your stay Medical Interventions: Full interventions Other Instructions: The palliative inpatient team of Ritu Patten NP and Gissell VILLANUEVAW met with you while you were at [address] [address]. Our team is here to provide emotional support and to address your goals for your health care. You also met with the , Jeny Sorto, to discuss your Advanced Medical Directives where you named those listed above as being your voice if you are unable to make health care decisions. Please know that you can reach us if you have questions at :[phone]. We wish you the best with your health care needs. Because of the importance of this information, we are providing you with a printed copy to share with other healthcare providers after this hospitalization is complete. Providers Seen During Your Hospitalization Provider Specialty Primary office phone Dheeraj Lutz MD Emergency Medicine [phone] Raoul Simmonds, MD Internal Medicine [phone] Your Primary Care Physician (PCP) Primary Care Physician Office Phone Office Fax Darral Lipoma L [phone] You are allergic to the following No active allergies Recent Documentation Height Weight BMI OB Status Smoking Status 1.676 m (!) 182.9 kg 65.08 kg/m2 Injection Current Every Day Smoker Emergency Contacts Name Discharge Info Relation Home Work Mobile JayjayEdwar DISCHARGE CAREGIVER [3] Son [22] [phone] CervantesCecile DISCHARGE CAREGIVER [3] Friend [5] [phone] Patient Belongings The following personal items are in your possession at time of discharge: 
  Dental Appliances: None  Visual Aid: None      Home Medications: None   Jewelry: None  Clothing: At bedside    Other Valuables: Cell Phone Please provide this summary of care documentation to your next provider. Signatures-by signing, you are acknowledging that this After Visit Summary has been reviewed with you and you have received a copy. Patient Signature:  ____________________________________________________________ Date:  ____________________________________________________________  
  
[facility] Provider Signature:  ____________________________________________________________ Date:  ____________________________________________________________

## 2018-08-19 NOTE — H&P
Hospitalist Admission Note    NAME: Mayi Srivastava   :  1970   MRN:  [id number]     Date/Time:  2018 5:44 AM    Patient PCP: Shankar Sherman NP  ______________________________________________________________________  Given the patient's current clinical presentation, I have a high level of concern for decompensation if discharged from the emergency department. Complex decision making was performed, which includes reviewing the patient's available past medical records, laboratory results, and x-ray films. My assessment of this patient's clinical condition and my plan of care is as follows. Assessment / Plan:  R diabetic foot wound with cellulitis, concern for underlying abscess/osteomyelitis:  - CT R foot requested  - R foot wound cultures requested. Does not meet sepsis criteria. - emperic vancomycin, cefepime and flagyl with comorbid DM  - podiatry consulted for potential debridement  Acute on chronic hypercarbic respiratory failure multifactorial due to acute COPD exacerbation, obstructive sleep apnea on chronic Trilogy at home and chronic diastolic congestive heart failure compensated: recent admission - for SVT, resp failure  - CTA chest this morning normal CT arteriogram of the chest. No pulmonary embolus. - Pt requesting BiPAP at this time for dyspnea, though blood gas in ER is reviewed and looks similar to priors. Will ask pulmonology for assistance with complex mgmt. Additionally, Pt did not bring trilogy to hospital and says that no one can bring it up for her so will need to use BiPAP every night and prn.  - IV solumedrol and scheduled duonebs ordered  - on Abx for foot issues as above  - mucinex with prn albuterol ordered  Insulin dependent DM2 controlled without complication:  - con't home lantus, glyburide  - lispro sliding scale  Rt breast mass suspicious for ca:   - recent mammogram  highly suggestive of malignancy.   Widespread  malignant appearing right breast calcifications. - reviewed findings with Pt in ER; she says that she has been called about this and is aware that outpatient Ultrasound-guided core needle right breast biopsy is recommended  Hypertension, benign/essential:  - con't toprol, lasix  - prn nitrobid  Dyslipidemia: con't statin  Supermorbid obesity    Code Status: Full  DVT Prophylaxis: heparin    Baseline: lives at home but dependent on caregivers who come in to provide her care        R foot      Subjective:   CHIEF COMPLAINT: shortness of breath    HISTORY OF PRESENT ILLNESS:     Anastacio Bower is a 57 y.o.  female who presents with above. Pt recently admitted to [facility] -. She says that she has continued to do poorly since discharge. She complains of ongoing shortness of breath essentially all the time. She does not have much of a cough and it is not productive. She denies CP. She denies fever or URI sx. She has been compliant with her trilogy machine at home she says but did not bring it to the hospital.  She denies changes in po intake. Denies stool changes. She has chronic LE wounds but says that recently her R foot has become severely painful when she bears weight. It has also developed new erythema as well. We were asked to admit for work up and evaluation of the above problems.      Past Medical History:   Diagnosis Date    Arthritis     Asthma     Breast lump     CAD (coronary artery disease)     Congestive heart failure (HCC)     COPD (chronic obstructive pulmonary disease) (HCC)     Diabetes ([address]     Hypertension     Morbid obesity with BMI of 70 and over, adult ([address]     NSTEMI (non-ST elevated myocardial infarction) ([address]     SVT (supraventricular tachycardia) ([address]         Past Surgical History:   Procedure Laterality Date    CARDIAC SURG PROCEDURE UNLIST      Stents    HX  SECTION      HX ORTHOPAEDIC      HX OTHER SURGICAL      cyst removed from back       Social History Substance Use Topics    Smoking status: Current Every Day Smoker     Packs/day: 0.25     Types: Cigarettes    Smokeless tobacco: Never Used      Comment: Patient states \"I  aint smoking no more d*mn cigarettes after yesterday\" 01/26/2018    Alcohol use No        Family History   Problem Relation Age of Onset    Heart Disease Mother     Heart Disease Father     Heart Disease Sister     Breast Cancer Maternal Grandmother     Breast Cancer Paternal Grandmother      No Known Allergies     Prior to Admission medications    Medication Sig Start Date End Date Taking? Authorizing Provider   albuterol (VENTOLIN HFA) 90 mcg/actuation inhaler Take 2 Puffs by inhalation every six (6) hours as needed for Wheezing. Historical Provider   cyclobenzaprine (FLEXERIL) 5 mg tablet Take 5 mg by mouth two (2) times a day. flexeriol    Historical Provider   metoprolol succinate (TOPROL-XL) 25 mg XL tablet Take  by mouth two (2) times a day. Landon Raza MD   furosemide (LASIX) 40 mg tablet Take 40 mg by mouth two (2) times a day. Indications: Peripheral Edema due to Chronic Heart Failure    Landon Raza MD   albuterol (PROVENTIL VENTOLIN) 2.5 mg /3 mL (0.083 %) nebulizer solution 3 mL by Nebulization route every four (4) hours as needed for Wheezing. 2/3/18   Rosa Isela Jiang MD   aspirin 81 mg chewable tablet Take 81 mg by mouth daily. Historical Provider   insulin glargine (LANTUS) 100 unit/mL injection 15 Units by SubCUTAneous route daily. Historical Provider   hydrOXYzine pamoate (VISTARIL) 50 mg capsule Take 50 mg by mouth every eight (8) hours as needed for Itching. Indications: Pruritus of Skin    Historical Provider   nystatin (MYCOSTATIN) topical cream Apply  to affected area two (2) times daily as needed for Skin Irritation or Itching. Historical Provider   fluticasone (FLONASE) 50 mcg/actuation nasal spray 2 Sprays by Both Nostrils route daily. 9/9/17   Nesha Gallardo MD   lovastatin (MEVACOR) 40 mg tablet Take 1 Tab by mouth nightly. 1/14/16   Brando Scott NP   glyBURIDE (DIABETA) 5 mg tablet Take 5 mg by mouth Daily (before breakfast). Historical Provider   cetirizine (ZYRTEC) 10 mg tablet Take 10 mg by mouth daily as needed for Allergies. Historical Provider   ketoconazole (NIZORAL) 2 % topical cream Apply  to affected area daily. Historical Provider   ammonium lactate (LAC-HYDRIN) 12 % topical cream rub in to affected area well 11/21/14   Boyd Mckinney MD   nitroglycerin (NITROSTAT) 0.4 mg SL tablet 1 Tab by SubLINGual route every five (5) minutes as needed for Chest Pain (call 911 if not relieved by 3). 9/12/13   Brando Scott NP       REVIEW OF SYSTEMS:     I am not able to complete the review of systems because:    The patient is intubated and sedated    The patient has altered mental status due to his acute medical problems    The patient has baseline aphasia from prior stroke(s)    The patient has baseline dementia and is not reliable historian    The patient is in acute medical distress and unable to provide information           Total of 12 systems reviewed as follows:       POSITIVE= underlined text  Negative = text not underlined  General:  fever, chills, sweats, generalized weakness, weight loss/gain,      loss of appetite   Eyes:    blurred vision, eye pain, loss of vision, double vision  ENT:    rhinorrhea, pharyngitis   Respiratory:   cough, sputum production, SOB, RAZO, wheezing, pleuritic pain   Cardiology:   chest pain, palpitations, orthopnea, PND, edema, syncope   Gastrointestinal:  abdominal pain , N/V, diarrhea, dysphagia, constipation, bleeding   Genitourinary:  frequency, urgency, dysuria, hematuria, incontinence   Muskuloskeletal :  arthralgia, myalgia, back pain  Hematology:  easy bruising, nose or gum bleeding, lymphadenopathy   Dermatological: rash, ulceration, pruritis, color change / jaundice  Endocrine:   hot flashes or polydipsia   Neurological:  headache, dizziness, confusion, focal weakness, paresthesia,     Speech difficulties, memory loss, gait difficulty  Psychological: Feelings of anxiety, depression, agitation    Objective:   VITALS:    Visit Vitals    /71    Pulse (!) 101    Temp 98.3 °F (36.8 °C)    Resp 15    Ht 5' 6\" (1.676 m)    Wt (!) 200.5 kg (442 lb)    SpO2 (!) 89%    BMI 71.34 kg/m2       PHYSICAL EXAM:    General:    Obese, alert, cooperative, no distress, appears stated age. HEENT: Atraumatic, anicteric sclerae, pink conjunctivae     No oral ulcers, mucosa moist, throat clear, dentition fair  Neck:  Supple, symmetrical,  thyroid: non tender  Lungs:   Clear to auscultation bilaterally. Poor air movement. No Wheezing or Rhonchi. No rales. Chest wall:  No tenderness  No Accessory muscle use. Heart:   Regular rhythm,  No  murmur   No edema  Abdomen:   Soft, non-tender. severely distended. Bowel sounds normal  Extremities: No cyanosis. No clubbing,      Skin turgor normal, Capillary refill normal, Radial dial pulse 2+  Skin:     Not pale. Not Jaundiced. Boggy mid-foot on plantar surface on R with surrounding erythema that extends up medically to her ankle  Psych:  Some insight. Not depressed. Not anxious or agitated. Neurologic: EOMs intact. No facial asymmetry. No aphasia or slurred speech. Symmetrical strength, Sensation grossly intact.  Alert and oriented X 4.     _______________________________________________________________________  Care Plan discussed with:    Comments   Patient x    Family      RN x    Care Manager                    Consultant:      _______________________________________________________________________  Expected  Disposition:   Home with Family x   HH/PT/OT/RN x   SNF/LTC    LUIS A    ________________________________________________________________________  TOTAL TIME:  54 Minutes    Critical Care Provided     Minutes non procedure based      Comments    x Reviewed previous records   >50% of visit spent in counseling and coordination of care x Discussion with patient and/or family and questions answered       ________________________________________________________________________  Signed: Ankit Ryan MD    Procedures: see electronic medical records for all procedures/Xrays and details which were not copied into this note but were reviewed prior to creation of Plan. LAB DATA REVIEWED:    Recent Results (from the past 24 hour(s))   EKG, 12 LEAD, INITIAL    Collection Time: 08/19/18 12:43 AM   Result Value Ref Range    Ventricular Rate 88 BPM    Atrial Rate 88 BPM    P-R Interval 184 ms    QRS Duration 86 ms    Q-T Interval 350 ms    QTC Calculation (Bezet) 423 ms    Calculated P Axis 30 degrees    Calculated R Axis 64 degrees    Calculated T Axis 36 degrees    Diagnosis       Normal sinus rhythm  Low voltage QRS  When compared with ECG of 08-JUL-2018 22:57,  No significant change was found     BLOOD GAS, ARTERIAL    Collection Time: 08/19/18  2:11 AM   Result Value Ref Range    pH 7.37 7.35 - 7.45      PCO2 59 (H) 35.0 - 45.0 mmHg    PO2 79 (L) 80 - 100 mmHg    O2 SAT 95 92 - 97 %    BICARBONATE 34 (H) 22 - 26 mmol/L    BASE EXCESS 6.6 mmol/L    O2 METHOD NASAL O2      O2 FLOW RATE 6.00 L/min    SPONTANEOUS RATE 22.0      Sample source ARTERIAL      SITE RIGHT RADIAL      PARUL'S TEST YES     CBC WITH AUTOMATED DIFF    Collection Time: 08/19/18  2:21 AM   Result Value Ref Range    WBC 9.0 3.6 - 11.0 K/uL    RBC 4.07 3.80 - 5.20 M/uL    HGB 11.8 11.5 - 16.0 g/dL    HCT 39.1 35.0 - 47.0 %    MCV 96.1 80.0 - 99.0 FL    MCH 29.0 26.0 - 34.0 PG    MCHC 30.2 30.0 - 36.5 g/dL    RDW 12.6 11.5 - 14.5 %    PLATELET 261 641 - 807 K/uL    MPV 11.1 8.9 - 12.9 FL    NRBC 0.0 0  WBC    ABSOLUTE NRBC 0.00 0.00 - 0.01 K/uL    NEUTROPHILS 75 32 - 75 %    LYMPHOCYTES 13 12 - 49 %    MONOCYTES 9 5 - 13 %    EOSINOPHILS 2 0 - 7 %    BASOPHILS 0 0 - 1 %    IMMATURE GRANULOCYTES 0 0.0 - 0.5 %    ABS.  NEUTROPHILS 6.7 1.8 - 8.0 K/UL    ABS. LYMPHOCYTES 1.2 0.8 - 3.5 K/UL    ABS. MONOCYTES 0.8 0.0 - 1.0 K/UL    ABS. EOSINOPHILS 0.2 0.0 - 0.4 K/UL    ABS. BASOPHILS 0.0 0.0 - 0.1 K/UL    ABS. IMM. GRANS. 0.0 0.00 - 0.04 K/UL    DF AUTOMATED     METABOLIC PANEL, COMPREHENSIVE    Collection Time: 08/19/18  2:21 AM   Result Value Ref Range    Sodium 137 136 - 145 mmol/L    Potassium 4.0 3.5 - 5.1 mmol/L    Chloride 98 97 - 108 mmol/L    CO2 37 (H) 21 - 32 mmol/L    Anion gap 2 (L) 5 - 15 mmol/L    Glucose 123 (H) 65 - 100 mg/dL    BUN 16 6 - 20 MG/DL    Creatinine 0.88 0.55 - 1.02 MG/DL    BUN/Creatinine ratio 18 12 - 20      GFR est AA >60 >60 ml/min/1.73m2    GFR est non-AA >60 >60 ml/min/1.73m2    Calcium 10.8 (H) 8.5 - 10.1 MG/DL    Bilirubin, total 0.5 0.2 - 1.0 MG/DL    ALT (SGPT) 17 12 - 78 U/L    AST (SGOT) 15 15 - 37 U/L    Alk. phosphatase 98 45 - 117 U/L    Protein, total 8.9 (H) 6.4 - 8.2 g/dL    Albumin 3.2 (L) 3.5 - 5.0 g/dL    Globulin 5.7 (H) 2.0 - 4.0 g/dL    A-G Ratio 0.6 (L) 1.1 - 2.2     TROPONIN I    Collection Time: 08/19/18  2:21 AM   Result Value Ref Range    Troponin-I, Qt. <0.05 <0.05 ng/mL   CK W/ REFLX CKMB    Collection Time: 08/19/18  2:21 AM   Result Value Ref Range    CK 32 26 - 192 U/L   NT-PRO BNP    Collection Time: 08/19/18  2:21 AM   Result Value Ref Range    NT pro- (H) 0 - 125 PG/ML   SAMPLES BEING HELD    Collection Time: 08/19/18  2:21 AM   Result Value Ref Range    SAMPLES BEING HELD BL     COMMENT        Add-on orders for these samples will be processed based on acceptable specimen integrity and analyte stability, which may vary by analyte.

## 2018-08-19 NOTE — PROGRESS NOTES
Bedside shift change report given to Gabriel Cowan RN (oncoming nurse). Report included the following information SBAR, Kardex, ED Summary, Intake/Output, MAR and Recent Results. SHIFT SUMMARY:  1130: Pt arrived to unit accompanied by ED RN. Pt has been oriented to unit -- Primary Nurse Chester Masterson and Micah Lara RN performed a dual skin assessment on this patient Impairment noted- see wound doc flow sheet. Pt has bilateral scaling on pts lower extremities. There is a chronic wound on LLE in which the pt sees OP wound care. Pt has excoriation and irritation in her fold areas that is red. Pt also has pink/ red, but blanchable area on buttocks. Jeffrey score is 16     1845: Pulmonary consult placed       [address] [facility] NURSING NOTE   Admission Date 8/19/2018   Admission Diagnosis Respiratory failure ([address]   Consults IP CONSULT TO PODIATRY  IP CONSULT TO PULMONOLOGY      Cardiac Monitoring [x] Yes [] No      Purposeful Hourly Rounding [x] Yes    Yu Score Total Score: 2   Yu score 3 or > [x] Bed Alarm [] Avasys [] 1:1 sitter [] Patient refused (Signed refusal form in chart)   Jeffrey Score Jeffrey Score: 16   Jeffrey score 14 or < [] PMT consult [] Wound Care consult    []  Specialty bed  [] Nutrition consult      Influenza Vaccine Received Flu Vaccine for Current Season (usually Sept-March): Not Flu Season           Oxygen needs? [] Room air Oxygen @  []1L    []2L    []3L   []4L    []5L   [x]6L via  NC   Chronic home O2 use?  [x] Yes [] No  Perform O2 challenge test and document in progress note using smartphrase (.Homeoxygen)      Last bowel movement        Urinary Catheter             LDAs               Peripheral IV 08/19/18 Left Arm (Active)   Site Assessment Clean, dry, & intact 8/19/2018  2:28 PM   Phlebitis Assessment 0 8/19/2018  2:28 PM   Infiltration Assessment 0 8/19/2018  2:28 PM   Dressing Status Clean, dry, & intact 8/19/2018  2:28 PM   Dressing Type Tape;Transparent 8/19/2018  2:28 PM   Hub Color/Line Status Pink;Patent; Flushed 8/19/2018  2:28 PM   Action Taken Blood drawn 8/19/2018  2:24 AM   Alcohol Cap Used Yes 8/19/2018  2:24 AM                         Readmission Risk Assessment Tool Score High Risk            27       Total Score        3 Has Seen PCP in Last 6 Months (Yes=3, No=0)    9 IP Visits Last 12 Months (1-3=4, 4=9, >4=11)    9 Pt. Coverage (Medicare=5 , Medicaid, or Self-Pay=4)    6 Charlson Comorbidity Score (Age + Comorbid Conditions)        Criteria that do not apply:    . Living with Significant Other. Assisted Living. LTAC. SNF.  or   Rehab    Patient Length of Stay (>5 days = 3)       Expected Length of Stay - - -   Actual Length of Stay 0

## 2018-08-19 NOTE — ED NOTES
Bedside and Verbal shift change report given to Deepali Lawrence RN (oncoming nurse) by Carlos Rod RN (offgoing nurse). Report included the following information SBAR, ED Summary, MAR and Recent Results.

## 2018-08-19 NOTE — ROUTINE PROCESS
TRANSFER - OUT REPORT:    Verbal report given to Anastasiya Norman RN(name) on [employer]  being transferred to [facility](unit) for routine progression of care       Report consisted of patients Situation, Background, Assessment and   Recommendations(SBAR). Information from the following report(s) SBAR, ED Summary, [facility] and Recent Results was reviewed with the receiving nurse. Lines:   Peripheral IV 08/19/18 Left Arm (Active)   Site Assessment Clean, dry, & intact 8/19/2018  2:24 AM   Phlebitis Assessment 0 8/19/2018  2:24 AM   Infiltration Assessment 0 8/19/2018  2:24 AM   Dressing Status Clean, dry, & intact 8/19/2018  2:24 AM   Dressing Type Transparent 8/19/2018  2:24 AM   Hub Color/Line Status Patent; Flushed 8/19/2018  2:24 AM   Action Taken Blood drawn 8/19/2018  2:24 AM   Alcohol Cap Used Yes 8/19/2018  2:24 AM        Opportunity for questions and clarification was provided.       Patient transported with:   O2 @ 6 liters

## 2018-08-19 NOTE — ED NOTES
Bedside report given to ____[facility], RN____________ and transfer of care

## 2018-08-19 NOTE — ED NOTES
Patient attempted to ambulate & not able to get off stretcher & sats down to 79% & Dr Atkins Early notified

## 2018-08-19 NOTE — PROGRESS NOTES
Problem: Falls - Risk of  Goal: *Absence of Falls  Document Yu Fall Risk and appropriate interventions in the flowsheet.    Outcome: Progressing Towards Goal  Fall Risk Interventions:  Mobility Interventions: Bed/chair exit alarm         Medication Interventions: Bed/chair exit alarm

## 2018-08-19 NOTE — ED NOTES
Bedside and Verbal shift change report given to Via Colin Reagan (oncoming nurse) by Jerilyn More (offgoing nurse). Report included the following information SBAR, ED Summary, Intake/Output, MAR and Recent Results.

## 2018-08-19 NOTE — PROGRESS NOTES
Pharmacy Automatic Renal Dosing Protocol - Antimicrobials    Indication for Antimicrobials: Cellulitis    Current Regimen of Each Antimicrobial:  Vancomycin 3000 x 1, followed by 1250 mg IV Q8H (Start Date 18; Day # 1)  Cefepime 2gm IV q8h - started  day 1  Previous Antimicrobial Therapy:   (Start Date ; Day    Goal Level: VANCOMYCIN TROUGH GOAL RANGE    Vancomycin Trough: 15 - 20 mcg/mL    Date Dose & Interval Measured (mcg/mL) Extrapolated (mcg/mL)                       Significant Cultures:     18 Foot Wound - Pending    Radiology / Imaging results: (X-ray, CT scan or MRI):     Paralysis, amputations, malnutrition: None noted    Labs:  Recent Labs      18   0221   CREA  0.88   BUN  16   WBC  9.0     Temp (24hrs), Av.4 °F (36.9 °C), Min:98.3 °F (36.8 °C), Max:98.4 °F (36.9 °C)    Creatinine Clearance (mL/min) or Dialysis: 75 ml/min    Impression/Plan:   · Will dose as above for predicted trough of 14.3 mcg/ml  · Antimicrobial stop date TBD     Pharmacy will follow daily and adjust medications as appropriate for renal function and/or serum levels.     Thank you,  Micah Oquendo, [address] [facility], [facility]

## 2018-08-19 NOTE — ED PROVIDER NOTES
EMERGENCY DEPARTMENT HISTORY AND PHYSICAL EXAM      Date: 8/19/2018  Patient Name: Rosamaria Kingston    History of Presenting Illness     Chief Complaint   Patient presents with    Chest Pain     arrives via EMS; pt complains of intermittent LEFT sided chest pain; onset 1700, non radiating with associated SOB    Shortness of Breath     hx COPD; pt is baseline on oxygen via NC a 6 L; pt reports cough with white sputum    Skin Problem     pt reports \"blisters\" to RLE x 2 weeks       History Provided By: Patient    HPI: Rosamaria Kingston, 57 y.o. female with PMHx significant for asthma, COPD, CHF, CAD, and diabetes, presents via EMS to the ED with cc of new onset CP and SOB with associated sx of fatigue, dry cough, HA,  and back pain. Pt states left-sided lasts seconds and is intermittent in nature. Pt rates the pain 5/10. CP began tonight around 17:00 which prompted ED visit. Pt currently on 6L of oxygen for COPD. Pt's SOB has been intermittent for past few days. Pt's last breathing tx was earlier today. Pt decreased oxygen 2 days ago, but then increased back to baseline next day. Pt has hx of stents and CHF. Pt denies taking coagulation medications, but endorses taking fluid pills (furomeside). Pt denies smoking (quit Jan 2018), EtOH, and drug use. Pt denies any radiating pain or n/v. Pt endorses using her Trilogy. Duration: CP and SOB   Timing:  Gradual and Intermittent  Location: left-sided chest  Quality: n/a  Severity: 5 out of 10  Modifying Factors: pt denies any modifying factors  Associated Symptoms: fatigue, dry cough, HA, and back pain    Social Hx: - Tobacco, - EtOH, - Illicit Drugs    There are no other complaints, changes, or physical findings at this time.     PCP: Maegan Riojas NP    Current Facility-Administered Medications   Medication Dose Route Frequency Provider Last Rate Last Dose    albuterol (PROVENTIL VENTOLIN) nebulizer solution 2.5 mg  2.5 mg Nebulization Q2H PRN Yesi Osei MD       [address] [place] [address] ammonium lactate (LAC-HYDRIN) 12 % lotion   Topical DAILY Celeste Denver, MD        aspirin chewable tablet 81 mg  81 mg Oral DAILY Celeste Denver, MD        cetirizine (ZYRTEC) tablet 10 mg  10 mg Oral DAILY PRN Celeste Denver, MD        furosemide (LASIX) tablet 40 mg  40 mg Oral ACB&D Celeste Denver, MD        glyBURIDE (DIABETA) tablet 5 mg  5 mg Oral ACB Celeste Denver, MD        hydrOXYzine pamoate (VISTARIL) capsule 50 mg  50 mg Oral Q8H PRN Celeste Denver, MD        insulin glargine (LANTUS) injection 15 Units  15 Units SubCUTAneous DAILY Celeste Denver, MD        pravastatin (PRAVACHOL) tablet 40 mg  40 mg Oral QHS Celeste Denver, MD        metoprolol succinate (TOPROL-XL) XL tablet 25 mg  25 mg Oral BID Celeste Denver, MD        sodium chloride (NS) flush 5-10 mL  5-10 mL IntraVENous Q8H Celeste Denver, MD        sodium chloride (NS) flush 5-10 mL  5-10 mL IntraVENous PRN Celeste Denver, MD        acetaminophen (TYLENOL) tablet 650 mg  650 mg Oral Q4H PRN Celeste Denver, MD        ondansetron [facility]) injection 4 mg  4 mg IntraVENous Q4H PRN Celeste Denver, MD        bisacodyl (DULCOLAX) tablet 5 mg  5 mg Oral DAILY PRN Celeste Denver, MD        heparin (porcine) injection 5,000 Units  5,000 Units SubCUTAneous Q8H Celeste Denver, MD        insulin lispro (HUMALOG) injection   SubCUTAneous AC&HS Celeste Denver, MD        glucose chewable tablet 16 g  4 Tab Oral PRN Celeste Denver, MD        dextrose (D50W) injection syrg 12.5-25 g  12.5-25 g IntraVENous PRN Celeste Denver, MD        glucagon [facility]) injection 1 mg  1 mg IntraMUSCular PRN Celeste Denver, MD        cyclobenzaprine (FLEXERIL) tablet 5 mg  5 mg Oral BID Celeste Denver, MD        metroNIDAZOLE (FLAGYL) IVPB premix 500 mg  500 mg IntraVENous Q8H Celeste Denver, MD        methylPREDNISolone (PF) (SOLU-MEDROL) injection 40 mg  40 mg IntraVENous Q12H Celeste Lo MD        guaiFENesin ER [facility]) tablet 600 mg  600 mg Oral BID Celeste Lo MD        albuterol-ipratropium (DUO-NEB) 2.5 MG-0.5 MG/3 ML  3 mL Nebulization Q6H RT Robb Manuel MD        cefepime (MAXIPIME) 2 g in 0.9% sodium chloride (MBP/ADV) 100 mL  2 g IntraVENous Q8H Robb Manuel MD        vancomycin (VANCOCIN) 3,000 mg in 0.9% sodium chloride 500 mL IVPB  3,000 mg IntraVENous NOW Robb Manuel MD        VANCOMYCIN INFORMATION NOTE   Other Rx Dosing/Monitoring Robb Manuel MD         Current Outpatient Prescriptions   Medication Sig Dispense Refill    albuterol (VENTOLIN HFA) 90 mcg/actuation inhaler Take 2 Puffs by inhalation every six (6) hours as needed for Wheezing.  cyclobenzaprine (FLEXERIL) 5 mg tablet Take 5 mg by mouth two (2) times a day. flexeriol      metoprolol succinate (TOPROL-XL) 25 mg XL tablet Take  by mouth two (2) times a day.  furosemide (LASIX) 40 mg tablet Take 40 mg by mouth two (2) times a day. Indications: Peripheral Edema due to Chronic Heart Failure      albuterol (PROVENTIL VENTOLIN) 2.5 mg /3 mL (0.083 %) nebulizer solution 3 mL by Nebulization route every four (4) hours as needed for Wheezing. 24 Each 0    aspirin 81 mg chewable tablet Take 81 mg by mouth daily.  insulin glargine (LANTUS) 100 unit/mL injection 15 Units by SubCUTAneous route daily.  hydrOXYzine pamoate (VISTARIL) 50 mg capsule Take 50 mg by mouth every eight (8) hours as needed for Itching. Indications: Pruritus of Skin      nystatin (MYCOSTATIN) topical cream Apply  to affected area two (2) times daily as needed for Skin Irritation or Itching.  fluticasone (FLONASE) 50 mcg/actuation nasal spray 2 Sprays by Both Nostrils route daily. 1 Bottle 0    lovastatin (MEVACOR) 40 mg tablet Take 1 Tab by mouth nightly. 30 Tab 6    glyBURIDE (DIABETA) 5 mg tablet Take 5 mg by mouth Daily (before breakfast).  cetirizine (ZYRTEC) 10 mg tablet Take 10 mg by mouth daily as needed for Allergies.  ketoconazole (NIZORAL) 2 % topical cream Apply  to affected area daily.       ammonium lactate (LAC-HYDRIN) 12 % topical cream rub in to affected area well 280 g 1    nitroglycerin (NITROSTAT) 0.4 mg SL tablet 1 Tab by SubLINGual route every five (5) minutes as needed for Chest Pain (call 911 if not relieved by 3). 25 Tab 2       Past History     Past Medical History:  Past Medical History:   Diagnosis Date    Arthritis     Asthma     Breast lump     CAD (coronary artery disease)     Congestive heart failure (HCC)     COPD (chronic obstructive pulmonary disease) (HCC)     Diabetes [facility]     Hypertension     Morbid obesity with BMI of 70 and over, adult ([facility]     NSTEMI (non-ST elevated myocardial infarction) ([facility]     SVT (supraventricular tachycardia) ([facility]        Past Surgical History:  Past Surgical History:   Procedure Laterality Date    CARDIAC SURG PROCEDURE UNLIST      Stents    HX  SECTION      HX ORTHOPAEDIC      HX OTHER SURGICAL      cyst removed from back       Family History:  Family History   Problem Relation Age of Onset    Heart Disease Mother     Heart Disease Father     Heart Disease Sister     Breast Cancer Maternal Grandmother     Breast Cancer Paternal Grandmother        Social History:  Social History   Substance Use Topics    Smoking status: Current Every Day Smoker     Packs/day: 0.25     Types: Cigarettes    Smokeless tobacco: Never Used      Comment: Patient states \"I  aint smoking no more d*mn cigarettes after yesterday\" 2018    Alcohol use No       Allergies:  No Known Allergies      Review of Systems   Review of Systems   Constitutional: Positive for fatigue. Negative for activity change, appetite change and fever. HENT: Negative. Negative for congestion, rhinorrhea and sore throat. Respiratory: Positive for cough (dry) and shortness of breath. Negative for wheezing. Cardiovascular: Positive for chest pain (left-sided). Negative for leg swelling. Gastrointestinal: Negative.   Negative for abdominal distention, abdominal pain, constipation, diarrhea, nausea and vomiting. Endocrine: Negative. Genitourinary: Negative for difficulty urinating, dysuria, menstrual problem, vaginal bleeding and vaginal discharge. Musculoskeletal: Positive for back pain. Negative for arthralgias, joint swelling and myalgias. Skin: Negative. Negative for rash. Neurological: Positive for headaches. Negative for dizziness, weakness and light-headedness. Psychiatric/Behavioral: Negative. Physical Exam   Physical Exam   Constitutional: She is oriented to person, place, and time. Nasal cannula in place. + morbidly obese; no acute distress   HENT:   Head: Atraumatic. Eyes: EOM are normal.   Cardiovascular: Regular rhythm, normal heart sounds and intact distal pulses. Exam reveals no gallop and no friction rub. No murmur heard. +bilateral lower extremity edema  Tachycardia 100-110   Pulmonary/Chest:   difficult to fully assess to body habitus; +6L nasal canula; + tachypnea   Abdominal: Soft. Bowel sounds are normal. She exhibits no distension and no mass. There is no tenderness. There is no rebound and no guarding. Musculoskeletal: Normal range of motion. She exhibits no edema or tenderness. Neurological: She is oriented to person, place, and time. Skin: Skin is warm. +skin thickening/skin changes consistent with venous stasis; +few scatter blister anterior shins   Psychiatric: She has a normal mood and affect. Nursing note and vitals reviewed.       Diagnostic Study Results     Labs -     Recent Results (from the past 12 hour(s))   EKG, 12 LEAD, INITIAL    Collection Time: 08/19/18 12:43 AM   Result Value Ref Range    Ventricular Rate 88 BPM    Atrial Rate 88 BPM    P-R Interval 184 ms    QRS Duration 86 ms    Q-T Interval 350 ms    QTC Calculation (Bezet) 423 ms    Calculated P Axis 30 degrees    Calculated R Axis 64 degrees    Calculated T Axis 36 degrees    Diagnosis       Normal sinus rhythm  Low voltage QRS  When compared with ECG of 08-JUL-2018 22:57,  No significant change was found     BLOOD GAS, ARTERIAL    Collection Time: 08/19/18  2:11 AM   Result Value Ref Range    pH 7.37 7.35 - 7.45      PCO2 59 (H) 35.0 - 45.0 mmHg    PO2 79 (L) 80 - 100 mmHg    O2 SAT 95 92 - 97 %    BICARBONATE 34 (H) 22 - 26 mmol/L    BASE EXCESS 6.6 mmol/L    O2 METHOD NASAL O2      O2 FLOW RATE 6.00 L/min    SPONTANEOUS RATE 22.0      Sample source ARTERIAL      SITE RIGHT RADIAL      PARUL'S TEST YES     CBC WITH AUTOMATED DIFF    Collection Time: 08/19/18  2:21 AM   Result Value Ref Range    WBC 9.0 3.6 - 11.0 K/uL    RBC 4.07 3.80 - 5.20 M/uL    HGB 11.8 11.5 - 16.0 g/dL    HCT 39.1 35.0 - 47.0 %    MCV 96.1 80.0 - 99.0 FL    MCH 29.0 26.0 - 34.0 PG    MCHC 30.2 30.0 - 36.5 g/dL    RDW 12.6 11.5 - 14.5 %    PLATELET 132 616 - 309 K/uL    MPV 11.1 8.9 - 12.9 FL    NRBC 0.0 0  WBC    ABSOLUTE NRBC 0.00 0.00 - 0.01 K/uL    NEUTROPHILS 75 32 - 75 %    LYMPHOCYTES 13 12 - 49 %    MONOCYTES 9 5 - 13 %    EOSINOPHILS 2 0 - 7 %    BASOPHILS 0 0 - 1 %    IMMATURE GRANULOCYTES 0 0.0 - 0.5 %    ABS. NEUTROPHILS 6.7 1.8 - 8.0 K/UL    ABS. LYMPHOCYTES 1.2 0.8 - 3.5 K/UL    ABS. MONOCYTES 0.8 0.0 - 1.0 K/UL    ABS. EOSINOPHILS 0.2 0.0 - 0.4 K/UL    ABS. BASOPHILS 0.0 0.0 - 0.1 K/UL    ABS. IMM. GRANS. 0.0 0.00 - 0.04 K/UL    DF AUTOMATED     METABOLIC PANEL, COMPREHENSIVE    Collection Time: 08/19/18  2:21 AM   Result Value Ref Range    Sodium 137 136 - 145 mmol/L    Potassium 4.0 3.5 - 5.1 mmol/L    Chloride 98 97 - 108 mmol/L    CO2 37 (H) 21 - 32 mmol/L    Anion gap 2 (L) 5 - 15 mmol/L    Glucose 123 (H) 65 - 100 mg/dL    BUN 16 6 - 20 MG/DL    Creatinine 0.88 0.55 - 1.02 MG/DL    BUN/Creatinine ratio 18 12 - 20      GFR est AA >60 >60 ml/min/1.73m2    GFR est non-AA >60 >60 ml/min/1.73m2    Calcium 10.8 (H) 8.5 - 10.1 MG/DL    Bilirubin, total 0.5 0.2 - 1.0 MG/DL    ALT (SGPT) 17 12 - 78 U/L    AST (SGOT) 15 15 - 37 U/L    Alk.  phosphatase 98 45 - 117 U/L    Protein, total 8.9 (H) 6.4 - 8.2 g/dL    Albumin 3.2 (L) 3.5 - 5.0 g/dL    Globulin 5.7 (H) 2.0 - 4.0 g/dL    A-G Ratio 0.6 (L) 1.1 - 2.2     TROPONIN I    Collection Time: 08/19/18  2:21 AM   Result Value Ref Range    Troponin-I, Qt. <0.05 <0.05 ng/mL   CK W/ REFLX CKMB    Collection Time: 08/19/18  2:21 AM   Result Value Ref Range    CK 32 26 - 192 U/L   NT-PRO BNP    Collection Time: 08/19/18  2:21 AM   Result Value Ref Range    NT pro- (H) 0 - 125 PG/ML   SAMPLES BEING HELD    Collection Time: 08/19/18  2:21 AM   Result Value Ref Range    SAMPLES BEING HELD BL     COMMENT        Add-on orders for these samples will be processed based on acceptable specimen integrity and analyte stability, which may vary by analyte. Radiologic Studies -     CT Results  (Last 48 hours)               08/19/18 0413  CTA CHEST W OR W WO CONT Final result    Impression:  IMPRESSION: Normal CT arteriogram of the chest. No pulmonary embolus. Narrative:  EXAM: CT ANGIOGRAPHY CHEST    INDICATION: Short of breath, morbid obesity, evaluate for pulmonary embolus. COMPARISON: 1/21/2018. CONTRAST: 100 mL of Isovue-370. TECHNIQUE:    Precontrast  images were obtained to localize the volume for acquisition. Multislice helical CT arteriography was performed from the diaphragm to the   thoracic inlet during uneventful rapid bolus intravenous contrast   administration. Lung and soft tissue windows were generated. Coronal and   sagittal  reformatted images were also generated and 3D post processing   consisting of coronal maximum intensity projection images was performed. CT dose   reduction was achieved through use of a standardized protocol tailored for this   examination and automatic exposure control for dose modulation. FINDINGS:   The patient is markedly obese resulting in less than optimal image quality.      LOWER NECK: The visualized portions of the thyroid and structures of the lower   neck are within normal limits. LUNGS: The lungs are clear of mass, nodule, airspace disease or edema. PLEURA: There is no pleural effusion or pneumothorax. PULMONARY ARTERIES: The pulmonary arteries are well enhanced and no pulmonary   emboli are identified. AORTA: The aorta enhances normally without evidence of aneurysm or dissection. MEDIASTINUM: There is no mediastinal or hilar adenopathy or mass. BONES AND SOFT TISSUES: The bones and soft tissues of the chest wall are within   normal limits. UPPER ABDOMEN: The visualized portions of the upper abdominal organs are normal.               CXR Results  (Last 48 hours)               08/19/18 0129  XR CHEST PORT Final result    Impression:  IMPRESSION: No acute abnormality. Narrative:  EXAM:  XR CHEST PORT. INDICATION: Chest pain. COMPARISON: 7/8/2018. FINDINGS:    A portable AP radiograph of the chest was obtained at 0123 hours. Lines and tubes: The patient is on a cardiac monitor and nasal oxygen. Lungs: The lungs are clear. Pleura: There is no pneumothorax or pleural effusion. Mediastinum: The cardiac and mediastinal contours and pulmonary vascularity are   normal.   Bones and soft tissues: The bones and soft tissues are grossly within normal   limits. CT LOW EXT RT WO CONT (Preliminary result) Result time: 08/19/18 07:39:03     Preliminary result by Greg, Rad Results In (08/19/18 07:31:23)     Initial Result:     Narrative:     PRELIMINARY REPORT    Circumferential subcutaneous swelling and fat stranding involving the distal  shin, hindfoot, midfoot, and forefoot, with swelling most prominent along the  dorsal aspect of the midfoot and forefoot. Findings consistent with cellulitis. There is a 1.2 x 0.8 cm soft tissue defect, consistent with an ulcer, in the  medial aspect of the plantar surface of the hindfoot. There is no evidence of  underlying osseous lucency or cortical irregularity to suggest osteomyelitis.      Preliminary report was provided by Dr. Dr. Shante Brandt, the on-call  radiologist, at 7:38 AM on 8/19/2018    Final report to follow. END PRELIMINARY REPORT         Medical Decision Making   I am the first provider for this patient. I reviewed the vital signs, available nursing notes, past medical history, past surgical history, family history and social history. Vital Signs-Reviewed the patient's vital signs. Patient Vitals for the past 12 hrs:   Temp Pulse Resp BP SpO2   08/19/18 0600 - (!) 104 22 123/55 (!) 89 %   08/19/18 0500 - (!) 101 15 113/71 (!) 89 %   08/19/18 0445 - (!) 108 20 - 92 %   08/19/18 0442 - - - 97/49 -   08/19/18 0300 - 93 19 - 97 %   08/19/18 0236 - 90 22 99/49 91 %   08/19/18 0224 - (!) 101 24 - 96 %   08/19/18 0200 - 97 23 - 93 %   08/19/18 0100 - 91 21 106/88 93 %   08/19/18 0045 - - - 137/77 93 %   08/19/18 0041 98.3 °F (36.8 °C) 89 20 137/77 94 %       Pulse Oximetry Analysis - 94% on RA    Cardiac Monitor:   Rate: 89 bpm  Rhythm: Normal Sinus Rhythm      EKG interpretation: (Preliminary) 0:43AM  Rhythm: normal sinus rhythm; Rate (approx.): 88; Axis: normal; PA interval: normal; QRS interval: normal ; ST/T wave: normal; Other findings: non-ischemic. Written by Gurdeep Blanc ED Scribsue, as dictated by Janet Tracey MD.    Records Reviewed: Nursing Notes, Old Medical Records, Previous electrocardiograms, Ambulance Run Sheet, Previous Radiology Studies and Previous Laboratory Studies    Provider Notes (Medical Decision Making):   DDx: acute/chronic respiratory failure, hypoxia, CO2 retention, hypoventilatory syndrome, PE, COPD, COPD exacerbation, heart failure, pneuomonia, medical regimen non-compliance, per chart review pt has been non-compliant with both her medicines and especially use of Trilogy. ED Course:   Initial assessment performed. The patients presenting problems have been discussed, and they are in agreement with the care plan formulated and outlined with them.   I have encouraged them to ask questions as they arise throughout their visit. PROGRESS NOTE:  3:07AM   Will ambulate pt. Written by Tram Lane ED Scribe as dictated by Brando Castillo MD    PROGRESS NOTE:  3:20AM  Pt is refusing pregnancy test.  Written by Tram Lane ED Scribe as dictated by Brando Castillo MD      PROGRESS NOTE  04:38AM  Pt was ambulated. Recumbent to sitting, pt was in low 90s. Sitting up, pt dropped to mid 80s. Pt was ambulated and dropped below 80. Pt cannot tolerate ambulation. Pt typically uses wheelchair at home but is capable of transferring without hypoxia  Written by Tram Lane ED Scribe as dictated by Brando Castillo MD      Critical Care Time: 0 minutes    Disposition:  Admit Note:  5:20 AM  Pt is being admitted by Dr. Brittaney Waddell. The results of their tests and reason(s) for their admission have been discussed with pt and/or available family. They convey agreement and understanding for the need to be admitted and for admission diagnosis. PLAN:  1. Admit to Hospital      Diagnosis     Clinical Impression:   1. Acute on chronic respiratory failure with hypoxia (HCC)    2. Morbid obesity ([address]    3. Chronic venous stasis dermatitis        Attestations: This note is prepared by Tram Lane, acting as Scribe for Brando Castillo MD.    The scribe's documentation has been prepared under my direction and personally reviewed by me in its entirety. I confirm that the note above accurately reflects all work, treatment, procedures, and medical decision making performed by me.   Brando Castillo MD

## 2018-08-19 NOTE — ED NOTES
Care assumed of patient @ this time & intro with rounding done, with report from ____Ольга Jimenez RN_______________. Patient resting quietly on stretcher without verbal complaints.

## 2018-08-19 NOTE — ED NOTES
Dr. Eliceo Sumner contacted in regards to pt refusing BiPAP. Pt remains stable at this time with VSS. Verbal orders given by Dr. Eliceo Sumner that patient may be transferred to telemetry bed. Supervisor notified.

## 2018-08-20 ENCOUNTER — HOSPITAL ENCOUNTER (OUTPATIENT)
Dept: ULTRASOUND IMAGING | Age: 48
Discharge: HOME | DRG: 597 | End: 2018-08-20
Attending: NURSE PRACTITIONER
Payer: MEDICARE

## 2018-08-20 DIAGNOSIS — R92.8 ABNORMAL MAMMOGRAM: ICD-10-CM

## 2018-08-20 LAB
ANION GAP SERPL CALC-SCNC: 3 MMOL/L (ref 5–15)
BUN SERPL-MCNC: 16 MG/DL (ref 6–20)
BUN/CREAT SERPL: 19 (ref 12–20)
CALCIUM SERPL-MCNC: 9.8 MG/DL (ref 8.5–10.1)
CHLORIDE SERPL-SCNC: 101 MMOL/L (ref 97–108)
CO2 SERPL-SCNC: 32 MMOL/L (ref 21–32)
CREAT SERPL-MCNC: 0.83 MG/DL (ref 0.55–1.02)
DATE LAST DOSE: ABNORMAL
ERYTHROCYTE [DISTWIDTH] IN BLOOD BY AUTOMATED COUNT: 12.5 % (ref 11.5–14.5)
GLUCOSE BLD STRIP.AUTO-MCNC: 263 MG/DL (ref 65–100)
GLUCOSE BLD STRIP.AUTO-MCNC: 264 MG/DL (ref 65–100)
GLUCOSE BLD STRIP.AUTO-MCNC: 265 MG/DL (ref 65–100)
GLUCOSE BLD STRIP.AUTO-MCNC: 293 MG/DL (ref 65–100)
GLUCOSE SERPL-MCNC: 286 MG/DL (ref 65–100)
HCT VFR BLD AUTO: 36.5 % (ref 35–47)
HGB BLD-MCNC: 10.8 G/DL (ref 11.5–16)
MAGNESIUM SERPL-MCNC: 1.9 MG/DL (ref 1.6–2.4)
MCH RBC QN AUTO: 28.4 PG (ref 26–34)
MCHC RBC AUTO-ENTMCNC: 29.6 G/DL (ref 30–36.5)
MCV RBC AUTO: 96.1 FL (ref 80–99)
NRBC # BLD: 0 K/UL (ref 0–0.01)
NRBC BLD-RTO: 0 PER 100 WBC
PLATELET # BLD AUTO: 184 K/UL (ref 150–400)
PMV BLD AUTO: 10.9 FL (ref 8.9–12.9)
POTASSIUM SERPL-SCNC: 4.8 MMOL/L (ref 3.5–5.1)
RBC # BLD AUTO: 3.8 M/UL (ref 3.8–5.2)
REPORTED DOSE,DOSE: ABNORMAL UNITS
REPORTED DOSE/TIME,TMG: 100
SERVICE CMNT-IMP: ABNORMAL
SODIUM SERPL-SCNC: 136 MMOL/L (ref 136–145)
VANCOMYCIN TROUGH SERPL-MCNC: 13.2 UG/ML (ref 5–10)
WBC # BLD AUTO: 11.7 K/UL (ref 3.6–11)

## 2018-08-20 PROCEDURE — 74011250636 HC RX REV CODE- 250/636: Performed by: GENERAL ACUTE CARE HOSPITAL

## 2018-08-20 PROCEDURE — 74011250637 HC RX REV CODE- 250/637: Performed by: INTERNAL MEDICINE

## 2018-08-20 PROCEDURE — 94760 N-INVAS EAR/PLS OXIMETRY 1: CPT

## 2018-08-20 PROCEDURE — 74011636637 HC RX REV CODE- 636/637: Performed by: INTERNAL MEDICINE

## 2018-08-20 PROCEDURE — 94640 AIRWAY INHALATION TREATMENT: CPT

## 2018-08-20 PROCEDURE — 94660 CPAP INITIATION&MGMT: CPT

## 2018-08-20 PROCEDURE — 85027 COMPLETE CBC AUTOMATED: CPT | Performed by: INTERNAL MEDICINE

## 2018-08-20 PROCEDURE — 74011000258 HC RX REV CODE- 258: Performed by: INTERNAL MEDICINE

## 2018-08-20 PROCEDURE — 77030020004 US BX BREAST VAC RT 1ST LESION W/CLIP AND SPECIMEN

## 2018-08-20 PROCEDURE — 74011250637 HC RX REV CODE- 250/637: Performed by: NURSE PRACTITIONER

## 2018-08-20 PROCEDURE — 74011000250 HC RX REV CODE- 250: Performed by: INTERNAL MEDICINE

## 2018-08-20 PROCEDURE — 80048 BASIC METABOLIC PNL TOTAL CA: CPT | Performed by: INTERNAL MEDICINE

## 2018-08-20 PROCEDURE — 0HBT3ZX EXCISION OF RIGHT BREAST, PERCUTANEOUS APPROACH, DIAGNOSTIC: ICD-10-PCS | Performed by: RADIOLOGY

## 2018-08-20 PROCEDURE — 77030018719 HC DRSG PTCH ANTIMIC J&J -A

## 2018-08-20 PROCEDURE — 88305 TISSUE EXAM BY PATHOLOGIST: CPT | Performed by: RADIOLOGY

## 2018-08-20 PROCEDURE — 36415 COLL VENOUS BLD VENIPUNCTURE: CPT | Performed by: INTERNAL MEDICINE

## 2018-08-20 PROCEDURE — 80202 ASSAY OF VANCOMYCIN: CPT | Performed by: GENERAL ACUTE CARE HOSPITAL

## 2018-08-20 PROCEDURE — 82962 GLUCOSE BLOOD TEST: CPT

## 2018-08-20 PROCEDURE — 65660000000 HC RM CCU STEPDOWN

## 2018-08-20 PROCEDURE — 77010033678 HC OXYGEN DAILY

## 2018-08-20 PROCEDURE — 83735 ASSAY OF MAGNESIUM: CPT | Performed by: INTERNAL MEDICINE

## 2018-08-20 PROCEDURE — 88360 TUMOR IMMUNOHISTOCHEM/MANUAL: CPT | Performed by: RADIOLOGY

## 2018-08-20 PROCEDURE — 74011250636 HC RX REV CODE- 250/636: Performed by: INTERNAL MEDICINE

## 2018-08-20 RX ORDER — PREDNISONE 20 MG/1
20 TABLET ORAL
Status: DISCONTINUED | OUTPATIENT
Start: 2018-08-20 | End: 2018-08-23 | Stop reason: HOSPADM

## 2018-08-20 RX ORDER — OXYCODONE HYDROCHLORIDE 5 MG/1
5 TABLET ORAL
Status: DISCONTINUED | OUTPATIENT
Start: 2018-08-20 | End: 2018-08-23 | Stop reason: HOSPADM

## 2018-08-20 RX ORDER — NYSTATIN 100000 [USP'U]/G
POWDER TOPICAL 2 TIMES DAILY
Status: DISCONTINUED | OUTPATIENT
Start: 2018-08-20 | End: 2018-08-23 | Stop reason: HOSPADM

## 2018-08-20 RX ORDER — SULFAMETHOXAZOLE AND TRIMETHOPRIM 800; 160 MG/1; MG/1
1 TABLET ORAL EVERY 12 HOURS
Status: DISCONTINUED | OUTPATIENT
Start: 2018-08-20 | End: 2018-08-21

## 2018-08-20 RX ORDER — AMOXICILLIN AND CLAVULANATE POTASSIUM 875; 125 MG/1; MG/1
1 TABLET, FILM COATED ORAL 2 TIMES DAILY WITH MEALS
Status: DISCONTINUED | OUTPATIENT
Start: 2018-08-20 | End: 2018-08-23 | Stop reason: HOSPADM

## 2018-08-20 RX ORDER — MORPHINE SULFATE 2 MG/ML
2 INJECTION, SOLUTION INTRAMUSCULAR; INTRAVENOUS ONCE
Status: COMPLETED | OUTPATIENT
Start: 2018-08-20 | End: 2018-08-20

## 2018-08-20 RX ADMIN — INSULIN LISPRO 5 UNITS: 100 INJECTION, SOLUTION INTRAVENOUS; SUBCUTANEOUS at 17:48

## 2018-08-20 RX ADMIN — IPRATROPIUM BROMIDE AND ALBUTEROL SULFATE 3 ML: .5; 3 SOLUTION RESPIRATORY (INHALATION) at 08:43

## 2018-08-20 RX ADMIN — MORPHINE SULFATE 2 MG: 2 INJECTION, SOLUTION INTRAMUSCULAR; INTRAVENOUS at 11:28

## 2018-08-20 RX ADMIN — CEFEPIME HYDROCHLORIDE 2 G: 2 INJECTION, POWDER, FOR SOLUTION INTRAVENOUS at 06:38

## 2018-08-20 RX ADMIN — SULFAMETHOXAZOLE AND TRIMETHOPRIM 1 TABLET: 800; 160 TABLET ORAL at 15:42

## 2018-08-20 RX ADMIN — Medication 10 ML: at 17:49

## 2018-08-20 RX ADMIN — CYCLOBENZAPRINE HYDROCHLORIDE 5 MG: 10 TABLET, FILM COATED ORAL at 09:05

## 2018-08-20 RX ADMIN — SULFAMETHOXAZOLE AND TRIMETHOPRIM 1 TABLET: 800; 160 TABLET ORAL at 22:40

## 2018-08-20 RX ADMIN — HEPARIN SODIUM 5000 UNITS: 5000 INJECTION INTRAVENOUS; SUBCUTANEOUS at 15:41

## 2018-08-20 RX ADMIN — METRONIDAZOLE 500 MG: 500 INJECTION, SOLUTION INTRAVENOUS at 07:16

## 2018-08-20 RX ADMIN — NYSTATIN: 100000 POWDER TOPICAL at 18:54

## 2018-08-20 RX ADMIN — AMOXICILLIN AND CLAVULANATE POTASSIUM 1 TABLET: 875; 125 TABLET, FILM COATED ORAL at 17:48

## 2018-08-20 RX ADMIN — ASPIRIN 81 MG 81 MG: 81 TABLET ORAL at 09:04

## 2018-08-20 RX ADMIN — METOPROLOL SUCCINATE 25 MG: 50 TABLET, EXTENDED RELEASE ORAL at 17:48

## 2018-08-20 RX ADMIN — INSULIN GLARGINE 15 UNITS: 100 INJECTION, SOLUTION SUBCUTANEOUS at 09:06

## 2018-08-20 RX ADMIN — HEPARIN SODIUM 5000 UNITS: 5000 INJECTION INTRAVENOUS; SUBCUTANEOUS at 22:34

## 2018-08-20 RX ADMIN — CYCLOBENZAPRINE HYDROCHLORIDE 5 MG: 10 TABLET, FILM COATED ORAL at 17:48

## 2018-08-20 RX ADMIN — INSULIN LISPRO 5 UNITS: 100 INJECTION, SOLUTION INTRAVENOUS; SUBCUTANEOUS at 12:32

## 2018-08-20 RX ADMIN — Medication: at 11:28

## 2018-08-20 RX ADMIN — PRAVASTATIN SODIUM 40 MG: 40 TABLET ORAL at 21:14

## 2018-08-20 RX ADMIN — FUROSEMIDE 40 MG: 40 TABLET ORAL at 17:48

## 2018-08-20 RX ADMIN — INSULIN LISPRO 3 UNITS: 100 INJECTION, SOLUTION INTRAVENOUS; SUBCUTANEOUS at 21:12

## 2018-08-20 RX ADMIN — METOPROLOL SUCCINATE 25 MG: 50 TABLET, EXTENDED RELEASE ORAL at 09:05

## 2018-08-20 RX ADMIN — PREDNISONE 20 MG: 20 TABLET ORAL at 09:05

## 2018-08-20 RX ADMIN — HEPARIN SODIUM 5000 UNITS: 5000 INJECTION INTRAVENOUS; SUBCUTANEOUS at 06:41

## 2018-08-20 RX ADMIN — GUAIFENESIN 600 MG: 600 TABLET, EXTENDED RELEASE ORAL at 17:48

## 2018-08-20 RX ADMIN — FUROSEMIDE 40 MG: 40 TABLET ORAL at 09:05

## 2018-08-20 RX ADMIN — OXYCODONE HYDROCHLORIDE 5 MG: 5 TABLET ORAL at 21:13

## 2018-08-20 RX ADMIN — VANCOMYCIN HYDROCHLORIDE 1250 MG: 10 INJECTION, POWDER, LYOPHILIZED, FOR SOLUTION INTRAVENOUS at 01:45

## 2018-08-20 RX ADMIN — GLYBURIDE 5 MG: 5 TABLET ORAL at 09:04

## 2018-08-20 RX ADMIN — INSULIN LISPRO 5 UNITS: 100 INJECTION, SOLUTION INTRAVENOUS; SUBCUTANEOUS at 09:05

## 2018-08-20 RX ADMIN — GUAIFENESIN 600 MG: 600 TABLET, EXTENDED RELEASE ORAL at 09:04

## 2018-08-20 RX ADMIN — IPRATROPIUM BROMIDE AND ALBUTEROL SULFATE 3 ML: .5; 3 SOLUTION RESPIRATORY (INHALATION) at 19:14

## 2018-08-20 NOTE — PROGRESS NOTES
Bedside and Verbal shift change report given to NORMA goel (oncoming nurse). Report included the following information SBAR, Kardex, ED Summary, Procedure Summary, Intake/Output, MAR and Recent Results. SHIFT SUMMARY:  465: pt's iv infiltrated. picc team notified of need for US guided IV and vanc trough. 924: rn notified picc team via vm that pt needs midline. 1022: NP Tha notified that pt didn't tolerate midline/PICC insertion d/t inability to tolerate needed position. PICC team inserted PIV into L hand, but pt states it burns. Site appears fine but pt continues to complain. NP Ko to see pt.     1045: rn discussed am's events w dr Braxton Berger. md states keep pt's L hand IV.   1049: dr Braxton Berger talking to pt.  145: pdoiatry on-call md states he will see pt tonight or tomorrow morning. 249: MARLEEN Osorio states pt not to exceed current dietary kcal restrictions. 403: rn notified  that pt states she's to be d/c'd at 0800 tomorrow. 426: rn left  with wcrn to notify of pt's d/c tomorrow.

## 2018-08-20 NOTE — PROGRESS NOTES
Occupational Therapy    Acknowledge orders and completed chart review. Patient initially in agreement for OT services then refused OOB activity secondary to back pain. Education on back pain mgmt through positioning with sitting in chair however patient refusing as she had been up earlier. Patient with hx of non-compliance with therapy in past.  Will follow up for OT eval as appropriate and able.      Thank you,    Debbie Gee OTR/L

## 2018-08-20 NOTE — PROGRESS NOTES
TRANSFER - OUT REPORT:    Verbal report given to Ivy Montanez RN(name) on [employer]  being transferred to 2290(unit) for routine progression of care       Report consisted of patients Situation, Background, Assessment and   Recommendations(SBAR). Information from the following report(s) Procedure Summary was reviewed with the receiving nurse. Lines:   Peripheral IV 08/20/18 Right Hand (Active)   Site Assessment Clean;Dry 8/20/2018 10:06 AM   Phlebitis Assessment 0 8/20/2018 10:06 AM   Infiltration Assessment 0 8/20/2018 10:06 AM   Dressing Status Clean, dry, & intact; New 8/20/2018 10:06 AM   Dressing Type Transparent;Tape 8/20/2018 10:06 AM   Hub Color/Line Status Blue;Flushed;Patent 8/20/2018 10:06 AM   Alcohol Cap Used No 8/20/2018 10:06 AM        Opportunity for questions and clarification was provided.       Patient transported with:   Monitor

## 2018-08-20 NOTE — PROGRESS NOTES
Inpatient brought to stereo ready room in hospital bed for procedure. Waiting for Dr. Kiersten Shook to talk with patient about procedure. Waiting to go to [address] [facility] for right breast biopsy when they are ready.

## 2018-08-20 NOTE — CONSULTS
[facility]  Pulmonary, Critical Care, and Sleep Medicine    Initial Patient Consult    Name: Bernice Mason MRN: [id number]   : 1970 Hospital: [facility]   Date: 2018        IMPRESSION:   · Chronic hypercapnic respiratory failure  · GEN  · OHS  · COPD without exacerbation  · LE cellulitis  · Morbid obesity      RECOMMENDATIONS:   · BIPAP at night only  · Pt on trilogy at home prescribed by [facility]  · Jet nebs  · Taper steroids rapidly  · Pt at her baseline pulmonary status  · No evidence of acute pulmonary illness  · IV abx for cellulitis  · Mobilize  · Needs to follow with [facility] after DC home to continue to manage her trilogy at home  · Nothing more to add   · Will sign off     Subjective: This patient has been seen and evaluated at the request of Dr. Salas Rea for GEN/OHS. Patient is a 57 y.o. female presented with chronic sob due to morbid obesity, also c/o LE swelling, redness  ABG c/w with her baseline chronic compensated respiratory acidosis  Placed on bipap overnight to replace her trilogy  Now pt in no distress, off bipap  No pulmonary complaints      Past Medical History:   Diagnosis Date    Arthritis     Asthma     Breast lump     CAD (coronary artery disease)     Congestive heart failure (HCC)     [facility]     Diabetes [facility]     Hypertension     Morbid obesity with BMI of 70 and over, adult ([facility]     NSTEMI (non-ST elevated myocardial infarction) [facility]     SVT (supraventricular tachycardia) ([facility]       Past Surgical History:   Procedure Laterality Date    CARDIAC SURG PROCEDURE UNLIST      Stents    HX  SECTION      HX ORTHOPAEDIC      HX OTHER SURGICAL      cyst removed from back      Prior to Admission medications    Medication Sig Start Date End Date Taking?  Authorizing Provider   albuterol (VENTOLIN HFA) 90 mcg/actuation inhaler Take 2 Puffs by inhalation every six (6) hours as needed for Wheezing. Historical Provider   cyclobenzaprine (FLEXERIL) 5 mg tablet Take 5 mg by mouth two (2) times a day. flexeriol    Historical Provider   metoprolol succinate (TOPROL-XL) 25 mg XL tablet Take  by mouth two (2) times a day. Landon Raza MD   furosemide (LASIX) 40 mg tablet Take 40 mg by mouth two (2) times a day. Indications: Peripheral Edema due to Chronic Heart Failure    Landon Raza MD   albuterol (PROVENTIL VENTOLIN) 2.5 mg /3 mL (0.083 %) nebulizer solution 3 mL by Nebulization route every four (4) hours as needed for Wheezing. 2/3/18   Tyler Shell MD   aspirin 81 mg chewable tablet Take 81 mg by mouth daily. Historical Provider   insulin glargine (LANTUS) 100 unit/mL injection 15 Units by SubCUTAneous route daily. Historical Provider   hydrOXYzine pamoate (VISTARIL) 50 mg capsule Take 50 mg by mouth every eight (8) hours as needed for Itching. Indications: Pruritus of Skin    Historical Provider   nystatin (MYCOSTATIN) topical cream Apply  to affected area two (2) times daily as needed for Skin Irritation or Itching. Historical Provider   fluticasone (FLONASE) 50 mcg/actuation nasal spray 2 Sprays by Both Nostrils route daily. 9/9/17   Nesha Gallardo MD   lovastatin (MEVACOR) 40 mg tablet Take 1 Tab by mouth nightly. 1/14/16   Nael Riley NP   glyBURIDE (DIABETA) 5 mg tablet Take 5 mg by mouth Daily (before breakfast). Historical Provider   cetirizine (ZYRTEC) 10 mg tablet Take 10 mg by mouth daily as needed for Allergies. Historical Provider   ketoconazole (NIZORAL) 2 % topical cream Apply  to affected area daily. Historical Provider   ammonium lactate (LAC-HYDRIN) 12 % topical cream rub in to affected area well 11/21/14   Marianela Medina MD   nitroglycerin (NITROSTAT) 0.4 mg SL tablet 1 Tab by SubLINGual route every five (5) minutes as needed for Chest Pain (call 911 if not relieved by 3).  9/12/13   Nael Riley NP     No Known Allergies   Social History   Substance Use Topics    Smoking status: Current Every Day Smoker     Packs/day: 0.25     Types: Cigarettes    Smokeless tobacco: Never Used      Comment: Patient states \"I  aint smoking no more d*mn cigarettes after yesterday\" 01/26/2018    Alcohol use No      Family History   Problem Relation Age of Onset    Heart Disease Mother     Heart Disease Father     Heart Disease Sister     Breast Cancer Maternal Grandmother     Breast Cancer Paternal Grandmother         Current Facility-Administered Medications   Medication Dose Route Frequency    predniSONE (DELTASONE) tablet 20 mg  20 mg Oral DAILY WITH BREAKFAST    ammonium lactate (LAC-HYDRIN) 12 % lotion   Topical DAILY    aspirin chewable tablet 81 mg  81 mg Oral DAILY    furosemide (LASIX) tablet 40 mg  40 mg Oral ACB&D    glyBURIDE (DIABETA) tablet 5 mg  5 mg Oral ACB    insulin glargine (LANTUS) injection 15 Units  15 Units SubCUTAneous DAILY    pravastatin (PRAVACHOL) tablet 40 mg  40 mg Oral QHS    metoprolol succinate (TOPROL-XL) XL tablet 25 mg  25 mg Oral BID    sodium chloride (NS) flush 5-10 mL  5-10 mL IntraVENous Q8H    heparin (porcine) injection 5,000 Units  5,000 Units SubCUTAneous Q8H    insulin lispro (HUMALOG) injection   SubCUTAneous AC&HS    cyclobenzaprine (FLEXERIL) tablet 5 mg  5 mg Oral BID    metroNIDAZOLE (FLAGYL) IVPB premix 500 mg  500 mg IntraVENous Q8H    guaiFENesin ER (MUCINEX) tablet 600 mg  600 mg Oral BID    albuterol-ipratropium (DUO-NEB) 2.5 MG-0.5 MG/3 ML  3 mL Nebulization Q6H RT    cefepime (MAXIPIME) 2 g in 0.9% sodium chloride (MBP/ADV) 100 mL  2 g IntraVENous Q8H    vancomycin (VANCOCIN) 1250 mg in  ml infusion  1,250 mg IntraVENous Q8H    Vancomycin Trough Draw Reminder Note  1 Each Other ONCE       Review of Systems:  A comprehensive review of systems was negative except for: Respiratory: positive for dyspnea on exertion  Musculoskeletal: positive for LE swelling, redness    Objective:   Vital Signs:    Visit Vitals    /57 (BP 1 Location: Right arm, BP Patient Position: At rest;Lying left side)    Pulse 80    Temp 98.4 °F (36.9 °C)    Resp 20    Ht 5' 6\" (1.676 m)    Wt (!) 187.9 kg (414 lb 3.9 oz)    SpO2 99%    BMI 66.86 kg/m2       O2 Device: BIPAP   O2 Flow Rate (L/min): 6 l/min   Temp (24hrs), Av.5 °F (36.9 °C), Min:98 °F (36.7 °C), Max:99.2 °F (37.3 °C)       Intake/Output:   Last shift:      701 - 1900  In: 50 [P.O.:50]  Out: 475 [Urine:475]  Last 3 shifts: 1901 -  0700  In: 1200 [I.V.:1200]  Out: 850 [Urine:850]    Intake/Output Summary (Last 24 hours) at 18 0741  Last data filed at 18 0715   Gross per 24 hour   Intake             1250 ml   Output             1325 ml   Net              -75 ml      Physical Exam:   General:  Alert, cooperative, no distress, appears stated age. Head:  Normocephalic, without obvious abnormality, atraumatic. Eyes:  Conjunctivae/corneas clear. PERRL, EOMs intact. Nose: Nares normal. Septum midline. Mucosa normal. No drainage or sinus tenderness. Throat: Lips, mucosa, and tongue normal. Teeth and gums normal.   Neck: Supple, symmetrical, trachea midline, no adenopathy, thyroid: no enlargment/tenderness/nodules, no carotid bruit and no JVD. Back:   Symmetric, no curvature. ROM normal.   Lungs:   Clear to auscultation bilaterally. NO WHEEZING   Chest wall:  No tenderness or deformity. Heart:  Regular rate and rhythm, S1, S2 normal, no murmur, click, rub or gallop. Abdomen:   Soft, non-tender. Bowel sounds normal. No masses,  No organomegaly. Extremities: Extremities normal, atraumatic, no cyanosis, +++ edema. Pulses: 2+ and symmetric all extremities.    Skin: Skin color, texture, turgor normal. No rashes or lesions   Lymph nodes: Cervical, supraclavicular, and axillary nodes normal.   Neurologic: Grossly nonfocal     Data review:     Recent Results (from the past 24 hour(s))   GLUCOSE, POC    Collection Time: 08/19/18  8:34 AM   Result Value Ref Range    Glucose (POC) 261 (H) 65 - 100 mg/dL    Performed by Rajinder Carlos (PCT)    GLUCOSE, POC    Collection Time: 08/19/18 11:53 AM   Result Value Ref Range    Glucose (POC) 205 (H) 65 - 100 mg/dL    Performed by Kennth [place] ARUN(CON)    GLUCOSE, POC    Collection Time: 08/19/18  5:05 PM   Result Value Ref Range    Glucose (POC) 323 (H) 65 - 100 mg/dL    Performed by Itz Abreu    GLUCOSE, POC    Collection Time: 08/19/18  9:05 PM   Result Value Ref Range    Glucose (POC) 272 (H) 65 - 100 mg/dL    Performed by Analilia Riojas    CBC W/O DIFF    Collection Time: 08/20/18  5:01 AM   Result Value Ref Range    WBC 11.7 (H) 3.6 - 11.0 K/uL    RBC 3.80 3.80 - 5.20 M/uL    HGB 10.8 (L) 11.5 - 16.0 g/dL    HCT 36.5 35.0 - 47.0 %    MCV 96.1 80.0 - 99.0 FL    MCH 28.4 26.0 - 34.0 PG    MCHC 29.6 (L) 30.0 - 36.5 g/dL    RDW 12.5 11.5 - 14.5 %    PLATELET 136 254 - 720 K/uL    MPV 10.9 8.9 - 12.9 FL    NRBC 0.0 0  WBC    ABSOLUTE NRBC 0.00 0.00 - 7.88 K/uL   METABOLIC PANEL, BASIC    Collection Time: 08/20/18  5:01 AM   Result Value Ref Range    Sodium 136 136 - 145 mmol/L    Potassium 4.8 3.5 - 5.1 mmol/L    Chloride 101 97 - 108 mmol/L    CO2 32 21 - 32 mmol/L    Anion gap 3 (L) 5 - 15 mmol/L    Glucose 286 (H) 65 - 100 mg/dL    BUN 16 6 - 20 MG/DL    Creatinine 0.83 0.55 - 1.02 MG/DL    BUN/Creatinine ratio 19 12 - 20      GFR est AA >60 >60 ml/min/1.73m2    GFR est non-AA >60 >60 ml/min/1.73m2    Calcium 9.8 8.5 - 10.1 MG/DL   MAGNESIUM    Collection Time: 08/20/18  5:01 AM   Result Value Ref Range    Magnesium 1.9 1.6 - 2.4 mg/dL       Imaging:  I have personally reviewed the patients radiographs and have reviewed the reports:  CXR: nixon John MD

## 2018-08-20 NOTE — PROGRESS NOTES
Patient taken to [address], [facility] within [facility] [facility] room 3 for biopsy.

## 2018-08-20 NOTE — PROGRESS NOTES
Brief Nutrition Note    Chart reviewed and pt discussed with RN. Nutrition referral triggered due to EN/TPN PTA. False trigger, pt was not on TF or TPN PTA.     Georgeanna Epley, RDN  Pager: [phone]

## 2018-08-20 NOTE — PROGRESS NOTES
Physical Therapy:    Orders received and chart reviewed. Attempted PT evaluation. Patient initially agreeable to therapy, but then declined mobility due to reports of back pain. Encouraged seated EOB and repositioning, but pt continued to refuse. Will defer and continue to follow.  Thank you    Loretta Romero, PT, DPT

## 2018-08-20 NOTE — PROGRESS NOTES
Name of procedure:US guided right breast biopsy      Complications, if any, r/t procedure:none      Sedation medications given:none      Sedation tolerated: none      VS : Stable vs Unstable: stable      Post Procedure Care Needed/order sets in [facility]:ice biopsy site each hour for 30 minutes today until bedtime. Any other specific needs to pt. Care:observe site for any bleeding or oozing. Please call [phone] with any questions. After hours [phone] and ask to speak to the nurse on call.

## 2018-08-20 NOTE — PROGRESS NOTES
Problem: Falls - Risk of  Goal: *Absence of Falls  Document Yu Fall Risk and appropriate interventions in the flowsheet.    Outcome: Progressing Towards Goal  Fall Risk Interventions:  Mobility Interventions: Bed/chair exit alarm, Communicate number of staff needed for ambulation/transfer, OT consult for ADLs, Patient to call before getting OOB         Medication Interventions: Bed/chair exit alarm, Evaluate medications/consider consulting pharmacy, Patient to call before getting OOB, Teach patient to arise slowly    Elimination Interventions: Bed/chair exit alarm, Call light in reach, Patient to call for help with toileting needs, Toileting schedule/hourly rounds

## 2018-08-20 NOTE — PROGRESS NOTES
Hospitalist Progress Note    NAME: Marissa Ferguson   :  1970   MRN:  [id number]       Interim Hospital Summary: 57 y.o. female whom presented on 2018 with      Assessment / Plan:  R diabetic foot wound with cellulitis, concern for underlying abscess/osteomyelitis:  - CT R foot: Soft tissue ulceration and subcutaneous edema compatible with the clinical  diagnosis of cellulitis. No evidence of osteomyelitis or drainable abscess  - difficulty with peripheral IV access. Pt declined to have midline. Established peripheral IV access by the PICC team, but pt refused us to use the site. ABX changed to PO; Augmentin and bactrim. - pt will be seen by the podiatry tomorrow am.  - wound care team has been consulted as well. - Pt has not been followed by any of the medical provider since the discharge from the hospital. She stated \"I don't have car. I can't go to the clinic like you suggested. No, I am not going to nursing home. I will go home and do what I can. You give some antibiotic and I will get out of here soon\"  - pt declined to work with PT/OT today. Acute on chronic hypercarbic respiratory failure multifactorial due to acute COPD exacerbation, obstructive sleep apnea on chronic Trilogy at home and chronic diastolic congestive heart failure compensated  - recent admission - for SVT, resp failure  - CTA chest this morning normal CT arteriogram of the chest. No pulmonary embolus. - appreciate pulmonology input; BiPAP only night time  - pt is at her baseline; chronic O2 @ 6L VIA N/C    Insulin dependent DM2 controlled without complication  - con't home lantus, glyburide  - lispro sliding scale  - hgbA1C in am  - pt requested no diet restriction. Informed the pt about importance of following diabetic diet. Pt expressed her dissatisfaction verbally. Rt breast mass suspicious for ca:   - recent mammogram  highly suggestive of malignancy.   Widespread  malignant appearing right breast calcifications. Breast bx done during today. The result will be send to her pcp. Pt states, \" I go to see doctor when I can. Will see about that. \"    Hypertension, benign/essential:  - con't toprol, lasix  - prn nitrobid    Dyslipidemia: con't statin    Supermorbid obesity  - pt expressed/requested not to talk about life style changes. \"I do what I can. \"     Code Status: Full  DVT Prophylaxis: heparin     Baseline: lives at home but dependent on caregivers who come in to provide her     Recommended Disposition: Pt has been refusing PT/OT. Limited therapy offer secondary to lack of participation by the pt     Subjective:     Chief Complaint / Reason for Physician Visit  \"I don't want you to talk to me anything else other than my skin infection. \"  Discussed with RN events overnight. Review of Systems:  Symptom Y/N Comments  Symptom Y/N Comments   Fever/Chills n   Chest Pain n    Poor Appetite    Edema y    Cough    Abdominal Pain     Sputum    Joint Pain     SOB/RAZO * All the time  Pruritis/Rash     Nausea/vomit n   Tolerating PT/OT     Diarrhea    Tolerating Diet     Constipation    Other       Could NOT obtain due to:      Objective:     VITALS:   Last 24hrs VS reviewed since prior progress note. Most recent are:  Patient Vitals for the past 24 hrs:   Temp Pulse Resp BP SpO2   08/20/18 1200 98.5 °F (36.9 °C) 80 20 126/72 93 %   08/20/18 0816 98.4 °F (36.9 °C) 85 20 136/68 95 %   08/20/18 0437 98.4 °F (36.9 °C) 80 20 117/57 99 %   08/20/18 0425 - - - - 98 %   08/19/18 2335 - - - - 95 %   08/19/18 2257 99.2 °F (37.3 °C) 89 18 119/70 94 %   08/19/18 1942 98.7 °F (37.1 °C) 91 18 108/56 95 %   08/19/18 1605 - - 22 - 90 %   08/19/18 1601 98.1 °F (36.7 °C) 91 20 123/56 93 %       Intake/Output Summary (Last 24 hours) at 08/20/18 1545  Last data filed at 08/20/18 1543   Gross per 24 hour   Intake             1550 ml   Output             3225 ml   Net            -1675 ml        PHYSICAL EXAM:  General: Morbidly obese. Alert, uncooperative, no acute distress    EENT:  EOMI. Anicteric sclerae. MMM  Resp:  Clear in apex with decreased breath sounds at bases. no wheezing or rales. No accessory muscle use  CV:  Regular  rhythm  GI:  Soft, Non distended, Non tender.  +Bowel sounds  Neurologic:  Alert and oriented X 3, normal speech,   Psych:   Good insight. Not anxious nor agitated  Skin:  Lower extremities: both posterior LE has an area with thick yellow with some foul smelling drainage in combination with hyperkeratosis/skin scaling. Erythematous in all skin folds area. Reviewed most current lab test results and cultures  YES  Reviewed most current radiology test results   YES  Review and summation of old records today    NO  Reviewed patient's current orders and MAR    YES  PMH/SH reviewed - no change compared to H&P  ________________________________________________________________________  Care Plan discussed with:    Comments   Patient y    Family      RN y    Care Manager y    Consultant                       y Multidiciplinary team rounds were held today with , nursing, pharmacist and clinical coordinator. Patient's plan of care was discussed; medications were reviewed and discharge planning was addressed. ________________________________________________________________________  Total NON critical care TIME:  30   Minutes    Total CRITICAL CARE TIME Spent:   Minutes non procedure based      Comments   >50% of visit spent in counseling and coordination of care     ________________________________________________________________________  Melissa Ball, MARLEEN     Procedures: see electronic medical records for all procedures/Xrays and details which were not copied into this note but were reviewed prior to creation of Plan. LABS:  I reviewed today's most current labs and imaging studies.   Pertinent labs include:  Recent Labs      08/20/18   0501  08/19/18   0221   WBC  11.7*  9.0   HGB  10.8*  11.8   HCT  36.5 39.1   PLT  184  176     Recent Labs      08/20/18   0501  08/19/18   0221   NA  136  137   K  4.8  4.0   CL  101  98   CO2  32  37*   GLU  286*  123*   BUN  16  16   CREA  0.83  0.88   CA  9.8  10.8*   MG  1.9   --    ALB   --   3.2*   TBILI   --   0.5   SGOT   --   15   ALT   --   17       Signed: )Astrid Almazan, NP

## 2018-08-20 NOTE — PROGRESS NOTES
ADULT PROTOCOL: JET AEROSOL ASSESSMENT    Patient  Nicki Tsang     57 y.o.   female     8/20/2018  1:23 AM    Breath Sounds Pre Procedure: Right Breath Sounds: Diminished                               Left Breath Sounds: Diminished    Breath Sounds Post Procedure: Right Breath Sounds: Diminished                                 Left Breath Sounds: Diminished    Breathing pattern: Pre procedure Breathing Pattern: Regular          Post procedure Breathing Pattern: Regular    Heart Rate: Pre procedure Pulse: 81           Post procedure Pulse: 86    Resp Rate: Pre procedure Respirations: 21           Post procedure Respirations: 20    Peak Flow: Pre bronchodilator             Post bronchodilator       FVC/FEV1:  N/A    Incentive Spirometry:             Cough: Pre procedure Cough: Non-productive               Post procedure Cough: Non-productive    Suctioned: NO    Sputum: Pre procedure                   Post procedure      Oxygen: O2 Device: BIPAP   BIPAP AT NIGHT/ 6L NC during the day     Changed: NO    SpO2: Pre procedure SpO2: 95 %   with oxygen              Post procedure SpO2: 96 %  with oxygen    Nebulizer Therapy: Current medications Aerosolized Medications: DuoNeb      Changed: NO    Smoking History:    Smoking status: Current Every Day Smoker       Packs/day: 0.25       Types: Cigarettes    Smokeless tobacco: Never Used         Problem List:   Patient Active Problem List   Diagnosis Code    CHF (congestive heart failure) ([facility]) I50.9    Malignant essential hypertension I10    Asthma J45.909    Obesity hypoventilation syndrome ([facility]) E66.2    Dyspnea R06.00    Chest pressure R07.89    Acute on chronic diastolic heart failure ([facility]) I50.33    SVT (supraventricular tachycardia) ([facility]) I47.1    NSTEMI (non-ST elevated myocardial infarction) ([facility]) I21.4    S/P PTCA (percutaneous transluminal coronary angioplasty) Z98.61    Coronary atherosclerosis of native coronary artery I25.10    Hypoxia R09.02    Noncompliance with therapeutic plan Z91.11    Chest pain R07.9    GERD (gastroesophageal reflux disease) K21.9    Acute respiratory failure with hypoxia and hypercarbia ([facility]) J96.01, J96.02    COPD (chronic obstructive pulmonary disease) ([facility]) J44.9    Tobacco abuse Z72.0    Obesity, morbid (more than 100 lbs over ideal weight or BMI > 40) ([facility]) E66.01    Cellulitis L03.90    SIRS due to infectious process with acute organ dysfunction ([facility]) A41.9, R65.20    Sepsis associated hypotension ([facility]) A41.9    Gangrenous toe ([facility]) I96    Type II diabetes mellitus, uncontrolled ([facility]) E11.65    HTN (hypertension) L51    Diastolic CHF, chronic ([facility]) I50.32    Cough with hemoptysis R04.2    Lymphedema of both lower extremities I89.0    CAP (community acquired pneumonia) J18.9    Cellulitis, leg L03.119    COPD exacerbation ([facility]) J44.1    Maggot infestation B87.9    SOB (shortness of breath) R06.02    Shortness of breath R06.02    Multifocal pneumonia J18.9    Acute respiratory failure ([facility]) J96.00    Acute on chronic respiratory failure with hypoxia and hypercapnia ([facility]) J96.21, J96.22    NSVT (nonsustained ventricular tachycardia) ([facility]) I47.2    Respiratory failure ([facility]) J96.90       Respiratory Therapist: Miguel Lovelace, RT

## 2018-08-20 NOTE — PROGRESS NOTES
Acknowledged PICC line or Midline placement order. Pt has very limited vascular access. Pt was lying to side and she  refused lie on back. Pt doesn't cooperate repositioning to use the  ultrasound. PICC nurse inserted PIV to left hand. Notified primary nurse and Mrs Del Angel Paula NP. PICC order under vascular access team was cancelled.    Ronak Arredondo RN Vascular Access Team. PICC nurse

## 2018-08-21 ENCOUNTER — APPOINTMENT (OUTPATIENT)
Dept: CT IMAGING | Age: 48
DRG: 597 | End: 2018-08-21
Attending: PHYSICIAN ASSISTANT
Payer: MEDICARE

## 2018-08-21 ENCOUNTER — APPOINTMENT (OUTPATIENT)
Dept: ULTRASOUND IMAGING | Age: 48
DRG: 597 | End: 2018-08-21
Attending: NURSE PRACTITIONER
Payer: MEDICARE

## 2018-08-21 PROBLEM — R10.9 ABDOMINAL PAIN: Status: ACTIVE | Noted: 2018-08-21

## 2018-08-21 PROBLEM — R74.8 ELEVATED LIPASE: Status: ACTIVE | Noted: 2018-08-21

## 2018-08-21 LAB
ALBUMIN SERPL-MCNC: 2.8 G/DL (ref 3.5–5)
ALBUMIN/GLOB SERPL: 0.5 {RATIO} (ref 1.1–2.2)
ALP SERPL-CCNC: 80 U/L (ref 45–117)
ALT SERPL-CCNC: 19 U/L (ref 12–78)
ANION GAP SERPL CALC-SCNC: 4 MMOL/L (ref 5–15)
AST SERPL-CCNC: 20 U/L (ref 15–37)
BASOPHILS # BLD: 0 K/UL (ref 0–0.1)
BASOPHILS NFR BLD: 0 % (ref 0–1)
BILIRUB DIRECT SERPL-MCNC: 0.1 MG/DL (ref 0–0.2)
BILIRUB SERPL-MCNC: 0.5 MG/DL (ref 0.2–1)
BUN SERPL-MCNC: 19 MG/DL (ref 6–20)
BUN/CREAT SERPL: 24 (ref 12–20)
CALCIUM SERPL-MCNC: 9.8 MG/DL (ref 8.5–10.1)
CHLORIDE SERPL-SCNC: 100 MMOL/L (ref 97–108)
CO2 SERPL-SCNC: 32 MMOL/L (ref 21–32)
CREAT SERPL-MCNC: 0.78 MG/DL (ref 0.55–1.02)
DIFFERENTIAL METHOD BLD: ABNORMAL
EOSINOPHIL # BLD: 0 K/UL (ref 0–0.4)
EOSINOPHIL NFR BLD: 0 % (ref 0–7)
ERYTHROCYTE [DISTWIDTH] IN BLOOD BY AUTOMATED COUNT: 12.7 % (ref 11.5–14.5)
EST. AVERAGE GLUCOSE BLD GHB EST-MCNC: 189 MG/DL
GLOBULIN SER CALC-MCNC: 5.2 G/DL (ref 2–4)
GLUCOSE BLD STRIP.AUTO-MCNC: 153 MG/DL (ref 65–100)
GLUCOSE BLD STRIP.AUTO-MCNC: 158 MG/DL (ref 65–100)
GLUCOSE BLD STRIP.AUTO-MCNC: 160 MG/DL (ref 65–100)
GLUCOSE BLD STRIP.AUTO-MCNC: 165 MG/DL (ref 65–100)
GLUCOSE BLD STRIP.AUTO-MCNC: 184 MG/DL (ref 65–100)
GLUCOSE SERPL-MCNC: 189 MG/DL (ref 65–100)
HBA1C MFR BLD: 8.2 % (ref 4.2–6.3)
HCT VFR BLD AUTO: 37.9 % (ref 35–47)
HGB BLD-MCNC: 11.6 G/DL (ref 11.5–16)
IMM GRANULOCYTES # BLD: 0.1 K/UL (ref 0–0.04)
IMM GRANULOCYTES NFR BLD AUTO: 1 % (ref 0–0.5)
LIPASE SERPL-CCNC: >3000 U/L (ref 73–393)
LYMPHOCYTES # BLD: 1.4 K/UL (ref 0.8–3.5)
LYMPHOCYTES NFR BLD: 11 % (ref 12–49)
MCH RBC QN AUTO: 28.6 PG (ref 26–34)
MCHC RBC AUTO-ENTMCNC: 30.6 G/DL (ref 30–36.5)
MCV RBC AUTO: 93.6 FL (ref 80–99)
MONOCYTES # BLD: 1 K/UL (ref 0–1)
MONOCYTES NFR BLD: 8 % (ref 5–13)
NEUTS SEG # BLD: 10.1 K/UL (ref 1.8–8)
NEUTS SEG NFR BLD: 80 % (ref 32–75)
NRBC # BLD: 0 K/UL (ref 0–0.01)
NRBC BLD-RTO: 0 PER 100 WBC
PLATELET # BLD AUTO: 195 K/UL (ref 150–400)
PMV BLD AUTO: 10.7 FL (ref 8.9–12.9)
POTASSIUM SERPL-SCNC: 3.7 MMOL/L (ref 3.5–5.1)
PROT SERPL-MCNC: 8 G/DL (ref 6.4–8.2)
RBC # BLD AUTO: 4.05 M/UL (ref 3.8–5.2)
SERVICE CMNT-IMP: ABNORMAL
SODIUM SERPL-SCNC: 136 MMOL/L (ref 136–145)
WBC # BLD AUTO: 12.6 K/UL (ref 3.6–11)

## 2018-08-21 PROCEDURE — 80048 BASIC METABOLIC PNL TOTAL CA: CPT | Performed by: INTERNAL MEDICINE

## 2018-08-21 PROCEDURE — 94660 CPAP INITIATION&MGMT: CPT

## 2018-08-21 PROCEDURE — 97530 THERAPEUTIC ACTIVITIES: CPT | Performed by: OCCUPATIONAL THERAPIST

## 2018-08-21 PROCEDURE — 74011250637 HC RX REV CODE- 250/637: Performed by: NURSE PRACTITIONER

## 2018-08-21 PROCEDURE — 74011250636 HC RX REV CODE- 250/636: Performed by: INTERNAL MEDICINE

## 2018-08-21 PROCEDURE — 80076 HEPATIC FUNCTION PANEL: CPT | Performed by: NURSE PRACTITIONER

## 2018-08-21 PROCEDURE — 74011250637 HC RX REV CODE- 250/637: Performed by: INTERNAL MEDICINE

## 2018-08-21 PROCEDURE — 85025 COMPLETE CBC W/AUTO DIFF WBC: CPT | Performed by: NURSE PRACTITIONER

## 2018-08-21 PROCEDURE — 83036 HEMOGLOBIN GLYCOSYLATED A1C: CPT | Performed by: NURSE PRACTITIONER

## 2018-08-21 PROCEDURE — 97166 OT EVAL MOD COMPLEX 45 MIN: CPT | Performed by: OCCUPATIONAL THERAPIST

## 2018-08-21 PROCEDURE — 65660000000 HC RM CCU STEPDOWN

## 2018-08-21 PROCEDURE — 94760 N-INVAS EAR/PLS OXIMETRY 1: CPT

## 2018-08-21 PROCEDURE — 74011636320 HC RX REV CODE- 636/320: Performed by: GENERAL ACUTE CARE HOSPITAL

## 2018-08-21 PROCEDURE — G8978 MOBILITY CURRENT STATUS: HCPCS | Performed by: PHYSICAL THERAPIST

## 2018-08-21 PROCEDURE — 76700 US EXAM ABDOM COMPLETE: CPT

## 2018-08-21 PROCEDURE — 74177 CT ABD & PELVIS W/CONTRAST: CPT

## 2018-08-21 PROCEDURE — 82962 GLUCOSE BLOOD TEST: CPT

## 2018-08-21 PROCEDURE — 74011636637 HC RX REV CODE- 636/637: Performed by: INTERNAL MEDICINE

## 2018-08-21 PROCEDURE — 36415 COLL VENOUS BLD VENIPUNCTURE: CPT | Performed by: NURSE PRACTITIONER

## 2018-08-21 PROCEDURE — 97161 PT EVAL LOW COMPLEX 20 MIN: CPT | Performed by: PHYSICAL THERAPIST

## 2018-08-21 PROCEDURE — 83690 ASSAY OF LIPASE: CPT | Performed by: NURSE PRACTITIONER

## 2018-08-21 PROCEDURE — 74011250636 HC RX REV CODE- 250/636: Performed by: GENERAL ACUTE CARE HOSPITAL

## 2018-08-21 PROCEDURE — G8987 SELF CARE CURRENT STATUS: HCPCS | Performed by: OCCUPATIONAL THERAPIST

## 2018-08-21 PROCEDURE — G8979 MOBILITY GOAL STATUS: HCPCS | Performed by: PHYSICAL THERAPIST

## 2018-08-21 PROCEDURE — 74160 CT ABDOMEN W/CONTRAST: CPT

## 2018-08-21 PROCEDURE — 74011000250 HC RX REV CODE- 250: Performed by: INTERNAL MEDICINE

## 2018-08-21 PROCEDURE — 77010033678 HC OXYGEN DAILY

## 2018-08-21 PROCEDURE — G8988 SELF CARE GOAL STATUS: HCPCS | Performed by: OCCUPATIONAL THERAPIST

## 2018-08-21 PROCEDURE — G8989 SELF CARE D/C STATUS: HCPCS | Performed by: OCCUPATIONAL THERAPIST

## 2018-08-21 PROCEDURE — 94640 AIRWAY INHALATION TREATMENT: CPT

## 2018-08-21 PROCEDURE — 76937 US GUIDE VASCULAR ACCESS: CPT

## 2018-08-21 RX ORDER — TRIAMCINOLONE ACETONIDE 1 MG/G
OINTMENT TOPICAL 3 TIMES DAILY
Status: DISCONTINUED | OUTPATIENT
Start: 2018-08-21 | End: 2018-08-23 | Stop reason: HOSPADM

## 2018-08-21 RX ORDER — SODIUM CHLORIDE 9 MG/ML
50 INJECTION, SOLUTION INTRAVENOUS
Status: COMPLETED | OUTPATIENT
Start: 2018-08-21 | End: 2018-08-21

## 2018-08-21 RX ORDER — SODIUM CHLORIDE 0.9 % (FLUSH) 0.9 %
10 SYRINGE (ML) INJECTION
Status: COMPLETED | OUTPATIENT
Start: 2018-08-21 | End: 2018-08-21

## 2018-08-21 RX ORDER — AMMONIUM LACTATE 12 G/100G
LOTION TOPICAL 2 TIMES DAILY
Status: DISCONTINUED | OUTPATIENT
Start: 2018-08-21 | End: 2018-08-23 | Stop reason: HOSPADM

## 2018-08-21 RX ORDER — SODIUM CHLORIDE 9 MG/ML
75 INJECTION, SOLUTION INTRAVENOUS CONTINUOUS
Status: DISCONTINUED | OUTPATIENT
Start: 2018-08-21 | End: 2018-08-21

## 2018-08-21 RX ADMIN — SODIUM CHLORIDE 50 ML/HR: 900 INJECTION, SOLUTION INTRAVENOUS at 17:43

## 2018-08-21 RX ADMIN — HEPARIN SODIUM 5000 UNITS: 5000 INJECTION INTRAVENOUS; SUBCUTANEOUS at 22:13

## 2018-08-21 RX ADMIN — OXYCODONE HYDROCHLORIDE 5 MG: 5 TABLET ORAL at 06:46

## 2018-08-21 RX ADMIN — METOPROLOL SUCCINATE 25 MG: 50 TABLET, EXTENDED RELEASE ORAL at 09:13

## 2018-08-21 RX ADMIN — FUROSEMIDE 40 MG: 40 TABLET ORAL at 18:24

## 2018-08-21 RX ADMIN — METOPROLOL SUCCINATE 25 MG: 50 TABLET, EXTENDED RELEASE ORAL at 18:25

## 2018-08-21 RX ADMIN — PREDNISONE 20 MG: 20 TABLET ORAL at 09:14

## 2018-08-21 RX ADMIN — IPRATROPIUM BROMIDE AND ALBUTEROL SULFATE 3 ML: .5; 3 SOLUTION RESPIRATORY (INHALATION) at 13:30

## 2018-08-21 RX ADMIN — HEPARIN SODIUM 5000 UNITS: 5000 INJECTION INTRAVENOUS; SUBCUTANEOUS at 06:20

## 2018-08-21 RX ADMIN — IPRATROPIUM BROMIDE AND ALBUTEROL SULFATE 3 ML: .5; 3 SOLUTION RESPIRATORY (INHALATION) at 00:46

## 2018-08-21 RX ADMIN — GUAIFENESIN 600 MG: 600 TABLET, EXTENDED RELEASE ORAL at 09:13

## 2018-08-21 RX ADMIN — ASPIRIN 81 MG 81 MG: 81 TABLET ORAL at 09:08

## 2018-08-21 RX ADMIN — IOPAMIDOL 100 ML: 755 INJECTION, SOLUTION INTRAVENOUS at 17:43

## 2018-08-21 RX ADMIN — PRAVASTATIN SODIUM 40 MG: 40 TABLET ORAL at 22:00

## 2018-08-21 RX ADMIN — SULFAMETHOXAZOLE AND TRIMETHOPRIM 1 TABLET: 800; 160 TABLET ORAL at 09:15

## 2018-08-21 RX ADMIN — AMOXICILLIN AND CLAVULANATE POTASSIUM 1 TABLET: 875; 125 TABLET, FILM COATED ORAL at 18:24

## 2018-08-21 RX ADMIN — HEPARIN SODIUM 5000 UNITS: 5000 INJECTION INTRAVENOUS; SUBCUTANEOUS at 16:44

## 2018-08-21 RX ADMIN — Medication: at 09:07

## 2018-08-21 RX ADMIN — GUAIFENESIN 600 MG: 600 TABLET, EXTENDED RELEASE ORAL at 18:25

## 2018-08-21 RX ADMIN — Medication 10 ML: at 17:43

## 2018-08-21 RX ADMIN — NYSTATIN: 100000 POWDER TOPICAL at 09:14

## 2018-08-21 RX ADMIN — Medication 10 ML: at 22:21

## 2018-08-21 RX ADMIN — Medication 10 ML: at 16:44

## 2018-08-21 NOTE — WOUND CARE
Wound care consult for the bilateral leg skin condition. Dr. Yoan Tamayo has already seen this patient for her feet skin condition. Pt. Uses Lac Hydrin lotion daily at home and this has been successful over time but this lotion should be used twice daily after cleansing the skin - also twice daily. Assessment: The bilateral leg skin is healing slowly with some pink epithelial tissue in conjunction with hard callousing areas from chronically weeping skin and edema. There are no open wounds on the legs. The areas of \"tree-bark\" like skin will continue to heal over time but it would be preferable to use the Lac-hydrin lotion twice per day right after cleansing and rinsing the skin (apply to slightly damp skin) and allow it to absorb into the skin. Treatment: Pt. Is about to go to a test in just a few moments and the care will be done by the staff RN, Afia. Materials and elastic drapes left at the bedside. Orders for the legs: Cleanse the skin with soap and rinse well with water and dry. Immediately apply the Lac-Hydrin lotion and allow it to absorb into the skin.    Elisabeth Fox, RN, BSN, [employer]

## 2018-08-21 NOTE — PROGRESS NOTES
Hospitalist Progress Note    NAME: Mayi Srivastava   :  1970   MRN:  [id number]       Interim Hospital Summary: 57 y.o. female whom presented on 2018 with      Assessment / Plan:  Pancreatitis  - pt c/o nausea and diffuse abdominal pain over night. U/S ABD revealed hepatomegaly. Normal LFT. Lipase >3000. Placed pt on NPO with ice chips. GI has been consulted. R diabetic foot wound with cellulitis, concern for underlying abscess/osteomyelitis:  - CT R foot: Soft tissue ulceration and subcutaneous edema compatible with the clinical  diagnosis of cellulitis. No evidence of osteomyelitis or drainable abscess  - difficulty with peripheral IV access. Pt declined to have midline. Established peripheral IV access by the PICC team, but pt refused us to use the site. ABX changed to PO  - wound cx: stap aureus and probable enterococcus. Augmentin/Bactrim was started on . Continue with augmentin. D/c'd bactrim. No need for MRSA coverage at this time  - appreciate podiatry input; Topical triamcinalone 0.1% ointment TID PRN for rash. fup as outpatient  - appreciate wound care team input  - Pt has not been followed by any of the medical provider since the discharge from the hospital. She stated \"I don't have car. I can't go to the clinic like you suggested. No, I am not going to nursing home. I will go home and do what I can. You give some antibiotic and I will get out of here soon\"     Acute on chronic hypercarbic respiratory failure multifactorial due to acute COPD exacerbation, obstructive sleep apnea on chronic Trilogy at home and chronic diastolic congestive heart failure compensated  - recent admission  for SVT, resp failure  - CTA chest this morning normal CT arteriogram of the chest. No pulmonary embolus. - appreciate pulmonology input;  BiPAP only night time  - pt is at her baseline; chronic O2 @ 6L VIA N/C     Insulin dependent DM2 controlled without complication  - con't home lantus, glyburide  - lispro sliding scale  - hgbA1C 8.2  - pt requested no diet restriction. Informed the pt about importance of following diabetic diet. Pt is now on NPO which made her even more upset than yesterday. Pt expressed her dissatisfaction verbally. Explained to pt about disease process, but was extremely upset. Rt breast mass suspicious for ca:   - recent mammogram 8/13 highly suggestive of malignancy.  Widespread  malignant appearing right breast calcifications. Breast bx done during today. The result will be send to her pcp. Pt states, \" I go to see doctor when I can. Will see about that. \"     Hypertension, benign/essential:  - con't toprol, lasix  - prn nitrobid    Dyslipidemia: con't statin    Intertrigo  - apply nystatin powder after the skin care     Supermorbid obesity  - pt expressed/requested not to talk about life style changes. \"I do what I can. \"     Code Status: Full  DVT Prophylaxis: heparin      Baseline: lives at home but dependent on caregivers who come in to provide her      Recommended Disposition: Pt has been refusing PT/OT. Limited therapy offer secondary to lack of participation by the pt         Subjective:     Chief Complaint / Reason for Physician Visit  \"I want to eat. My stomach hurts at times, but I don't care\". Discussed with RN events overnight. Review of Systems:  Symptom Y/N Comments  Symptom Y/N Comments   Fever/Chills n   Chest Pain n    Poor Appetite n   Edema     Cough    Abdominal Pain y    Sputum    Joint Pain     SOB/RAZO n   Pruritis/Rash     Nausea/vomit y   Tolerating PT/OT     Diarrhea    Tolerating Diet     Constipation    Other       Could NOT obtain due to:      Objective:     VITALS:   Last 24hrs VS reviewed since prior progress note.  Most recent are:  Patient Vitals for the past 24 hrs:   Temp Pulse Resp BP SpO2   08/21/18 1230 98.6 °F (37 °C) 71 20 100/54 96 %   08/21/18 0656 97.6 °F (36.4 °C) 75 20 110/66 98 %   08/21/18 0325 97.9 °F (36.6 °C) 79 22 140/82 97 %   08/21/18 0119 - - - - 94 %   08/21/18 0046 - - - - 94 %   08/20/18 2252 97.8 °F (36.6 °C) 76 22 124/59 100 %   08/20/18 1928 98.4 °F (36.9 °C) 78 24 146/76 97 %   08/20/18 1915 - - - - 96 %   08/20/18 1603 98.6 °F (37 °C) 80 20 137/65 96 %       Intake/Output Summary (Last 24 hours) at 08/21/18 1403  Last data filed at 08/21/18 0817   Gross per 24 hour   Intake              470 ml   Output             1375 ml   Net             -905 ml        PHYSICAL EXAM:  General: Morbidly obese, Alert, cooperative, no acute distress    EENT:  EOMI. Anicteric sclerae. MMM  Resp:  Coarse breath sounds with diminished breath sounds at bases, no wheezing or rales. No accessory muscle use  CV:  Regular  rhythm,  chronic lower extremity edema  GI:  Soft, obese, diffuse tenderness through out.  +Bowel sounds  Neurologic:  Alert and oriented X 3, normal speech,   Psych:   Good insight. Not anxious nor agitated  Skin:  Lower extremities: both posterior LE has an area with thick yellow with some foul smelling drainage in combination with hyperkeratosis/skin scaling. Erythematous in all skin folds area. No jaundice    Reviewed most current lab test results and cultures  YES  Reviewed most current radiology test results   YES  Review and summation of old records today    NO  Reviewed patient's current orders and MAR    YES  PMH/ reviewed - no change compared to H&P  ________________________________________________________________________  Care Plan discussed with:    Comments   Patient y    Family      RN y    Care Manager y    Consultant                       y Multidiciplinary team rounds were held today with , nursing, pharmacist and clinical coordinator. Patient's plan of care was discussed; medications were reviewed and discharge planning was addressed.      ________________________________________________________________________  Total NON critical care TIME:  30   Minutes    Total CRITICAL CARE TIME Spent: Minutes non procedure based      Comments   >50% of visit spent in counseling and coordination of care     ________________________________________________________________________  Felecia Pacheco NP     Procedures: see electronic medical records for all procedures/Xrays and details which were not copied into this note but were reviewed prior to creation of Plan. LABS:  I reviewed today's most current labs and imaging studies.   Pertinent labs include:  Recent Labs      08/21/18 0222 08/20/18 0501  08/19/18 0221   WBC  12.6*  11.7*  9.0   HGB  11.6  10.8*  11.8   HCT  37.9  36.5  39.1   PLT  195  184  176     Recent Labs      08/21/18   0954  08/21/18 0221  08/20/18 0501 08/19/18 0221   NA   --   136  136  137   K   --   3.7  4.8  4.0   CL   --   100  101  98   CO2   --   32  32  37*   GLU   --   189*  286*  123*   BUN   --   19  16  16   CREA   --   0.78  0.83  0.88   CA   --   9.8  9.8  10.8*   MG   --    --   1.9   --    ALB  2.8*   --    --   3.2*   TBILI  0.5   --    --   0.5   SGOT  20   --    --   15   ALT  19   --    --   17       Signed: )Astrid Almazan, NP

## 2018-08-21 NOTE — CONSULTS
Podiatry Consult    Subjective:  Pt reports several small blisters that formed on the plantar right heel unknown time ago. These increased in size, and she had swelling of the right leg. She has had multiple instances of these blisters in the past, but they usually resolve on their own. Date of Consultation:  August 21, 2018    Referring Physician: Sudeep Vazquez MD    Patient is a 57 y.o.  female who is being seen for lesions right heel.     Patient Active Problem List    Diagnosis Date Noted    Respiratory failure ([address] 08/19/2018    NSVT (nonsustained ventricular tachycardia) ([address] 07/12/2018    Acute on chronic respiratory failure with hypoxia and hypercapnia (HCC) 01/30/2018    Acute respiratory failure ([address] 01/22/2018    Multifocal pneumonia 09/27/2017    Shortness of breath 09/24/2017    SOB (shortness of breath) 09/22/2017    Maggot infestation 07/25/2017    COPD exacerbation ([address] 10/28/2016    Cellulitis, leg 08/18/2016    CAP (community acquired pneumonia) 01/13/2016     Class: Present on Admission    Acute respiratory failure with hypoxia and hypercarbia ([address] 01/10/2016     Class: Present on Admission    SIRS due to infectious process with acute organ dysfunction ([address] 01/10/2016     Class: Acute    Cough with hemoptysis 01/10/2016     Class: Present on Admission    Lymphedema of both lower extremities 01/10/2016     Class: Chronic    Gangrenous toe ([address] 01/09/2016    Type II diabetes mellitus, uncontrolled ([address] 01/09/2016    HTN (hypertension) 00/04/8195    Diastolic CHF, chronic ([address] 01/09/2016    COPD (chronic obstructive pulmonary disease) ([address] 07/21/2015     Class: Chronic    Sepsis associated hypotension ([address] 07/21/2015    Cellulitis 05/25/2015    Tobacco abuse 01/13/2013    Obesity, morbid (more than 100 lbs over ideal weight or BMI > 40) ([address] 01/13/2013    Hypoxia 10/22/2012    Noncompliance with therapeutic plan 10/22/2012    Chest pain 10/22/2012    GERD (gastroesophageal reflux disease) 10/22/2012    S/P PTCA (percutaneous transluminal coronary angioplasty) 2012    Coronary atherosclerosis of native coronary artery 2012    SVT (supraventricular tachycardia) [facility] 2012    NSTEMI (non-ST elevated myocardial infarction) [facility] 2012    Acute on chronic diastolic heart failure ([facility] 2012    Malignant essential hypertension 2012    Asthma 2012     Class: Chronic    Obesity hypoventilation syndrome ([address] 2012    Dyspnea 2012    Chest pressure 2012    CHF (congestive heart failure) ([facility] 2012     Past Medical History:   Diagnosis Date    Arthritis     Asthma     Breast lump     CAD (coronary artery disease)     Congestive heart failure (HCC)     COPD (chronic obstructive pulmonary disease) (HCC)     Diabetes ([facility]     Hypertension     Morbid obesity with BMI of 70 and over, adult ([facility]     NSTEMI (non-ST elevated myocardial infarction) ([facility]     SVT (supraventricular tachycardia) ([facility]       Family History   Problem Relation Age of Onset    Heart Disease Mother     Heart Disease Father     Heart Disease Sister     Breast Cancer Maternal Grandmother     Breast Cancer Paternal Grandmother       Social History   Substance Use Topics    Smoking status: Current Every Day Smoker     Packs/day: 0.25     Types: Cigarettes    Smokeless tobacco: Never Used      Comment: Patient states \"I  aint smoking no more d*mn cigarettes after yesterday\" 2018    Alcohol use No     Past Surgical History:   Procedure Laterality Date    CARDIAC SURG PROCEDURE UNLIST      Stents    HX  SECTION      HX ORTHOPAEDIC      HX OTHER SURGICAL      cyst removed from back      Prior to Admission medications    Medication Sig Start Date End Date Taking?  Authorizing Provider   albuterol (VENTOLIN HFA) 90 mcg/actuation inhaler Take 2 Puffs by inhalation every six (6) hours as needed for Wheezing. Historical Provider   cyclobenzaprine (FLEXERIL) 5 mg tablet Take 5 mg by mouth two (2) times a day. flexeriol    Historical Provider   metoprolol succinate (TOPROL-XL) 25 mg XL tablet Take  by mouth two (2) times a day. Landon Raza MD   furosemide (LASIX) 40 mg tablet Take 40 mg by mouth two (2) times a day. Indications: Peripheral Edema due to Chronic Heart Failure    Landon Raza MD   albuterol (PROVENTIL VENTOLIN) 2.5 mg /3 mL (0.083 %) nebulizer solution 3 mL by Nebulization route every four (4) hours as needed for Wheezing. 2/3/18   Bolivar Garcia MD   aspirin 81 mg chewable tablet Take 81 mg by mouth daily. Historical Provider   insulin glargine (LANTUS) 100 unit/mL injection 15 Units by SubCUTAneous route daily. Historical Provider   hydrOXYzine pamoate (VISTARIL) 50 mg capsule Take 50 mg by mouth every eight (8) hours as needed for Itching. Indications: Pruritus of Skin    Historical Provider   nystatin (MYCOSTATIN) topical cream Apply  to affected area two (2) times daily as needed for Skin Irritation or Itching. Historical Provider   fluticasone (FLONASE) 50 mcg/actuation nasal spray 2 Sprays by Both Nostrils route daily. 9/9/17   Nesha Gallardo MD   lovastatin (MEVACOR) 40 mg tablet Take 1 Tab by mouth nightly. 1/14/16   Amira Mortensen NP   glyBURIDE (DIABETA) 5 mg tablet Take 5 mg by mouth Daily (before breakfast). Historical Provider   cetirizine (ZYRTEC) 10 mg tablet Take 10 mg by mouth daily as needed for Allergies. Historical Provider   ketoconazole (NIZORAL) 2 % topical cream Apply  to affected area daily. Historical Provider   ammonium lactate (LAC-HYDRIN) 12 % topical cream rub in to affected area well 11/21/14   Lauren Barrera MD   nitroglycerin (NITROSTAT) 0.4 mg SL tablet 1 Tab by SubLINGual route every five (5) minutes as needed for Chest Pain (call 911 if not relieved by 3).  9/12/13   Seaclover Mortensen NP     No Known Allergies     Review of Systems:  NAD. Morbidly obese. AOx3. Objective:     Patient Vitals for the past 8 hrs:   BP Temp Pulse Resp SpO2   18 0656 110/66 97.6 °F (36.4 °C) 75 20 98 %   18 0325 140/82 97.9 °F (36.6 °C) 79 22 97 %   18 0119 - - - - 94 %   18 0046 - - - - 94 %     Temp (24hrs), Av.1 °F (36.7 °C), Min:97.6 °F (36.4 °C), Max:98.6 °F (37 °C)    Exam:  Thick, peeling skin plantar feet B especially the right heel. Multiple small vesicles plantar right heel with clear fluid; several have joined to form a larger vesicle on the medial right heel. Lipodermatosclerosis of the legs B. Raised, dry, erythematous patches of skin on the legs and arms bilaterally. DP and PT palpable B; +3 pitting edema bilateral legs. Sensation intact to light touch. No gross deformity either foot. CT scan right foot: +Edema, but no abscess or bone erosions.      Data Review:   Recent Results (from the past 24 hour(s))   GLUCOSE, POC    Collection Time: 18  8:29 AM   Result Value Ref Range    Glucose (POC) 265 (H) 65 - 100 mg/dL    Performed by [facility] (PCT)    VANCOMYCIN, TROUGH    Collection Time: 18  9:24 AM   Result Value Ref Range    Vancomycin,trough 13.2 (H) 5.0 - 10.0 ug/mL    Reported dose date: 2018      Reported dose time: 100      Reported dose: 1250MG UNITS   GLUCOSE, POC    Collection Time: 18 12:19 PM   Result Value Ref Range    Glucose (POC) 264 (H) 65 - 100 mg/dL    Performed by [facility] (PCT)    GLUCOSE, POC    Collection Time: 18  4:39 PM   Result Value Ref Range    Glucose (POC) 263 (H) 65 - 100 mg/dL    Performed by [facility] (PCT)    GLUCOSE, POC    Collection Time: 18  8:56 PM   Result Value Ref Range    Glucose (POC) 293 (H) 65 - 100 mg/dL    Performed by [facility]    METABOLIC PANEL, BASIC    Collection Time: 18  2:21 AM   Result Value Ref Range    Sodium 136 136 - 145 mmol/L    Potassium 3.7 3.5 - 5.1 mmol/L    Chloride 100 97 - 108 mmol/L    CO2 32 21 - 32 mmol/L    Anion gap 4 (L) 5 - 15 mmol/L    Glucose 189 (H) 65 - 100 mg/dL    BUN 19 6 - 20 MG/DL    Creatinine 0.78 0.55 - 1.02 MG/DL    BUN/Creatinine ratio 24 (H) 12 - 20      GFR est AA >60 >60 ml/min/1.73m2    GFR est non-AA >60 >60 ml/min/1.73m2    Calcium 9.8 8.5 - 10.1 MG/DL   HEMOGLOBIN A1C WITH EAG    Collection Time: 08/21/18  2:22 AM   Result Value Ref Range    Hemoglobin A1c 8.2 (H) 4.2 - 6.3 %    Est. average glucose 189 mg/dL   CBC WITH AUTOMATED DIFF    Collection Time: 08/21/18  2:22 AM   Result Value Ref Range    WBC 12.6 (H) 3.6 - 11.0 K/uL    RBC 4.05 3.80 - 5.20 M/uL    HGB 11.6 11.5 - 16.0 g/dL    HCT 37.9 35.0 - 47.0 %    MCV 93.6 80.0 - 99.0 FL    MCH 28.6 26.0 - 34.0 PG    MCHC 30.6 30.0 - 36.5 g/dL    RDW 12.7 11.5 - 14.5 %    PLATELET 528 600 - 697 K/uL    MPV 10.7 8.9 - 12.9 FL    NRBC 0.0 0  WBC    ABSOLUTE NRBC 0.00 0.00 - 0.01 K/uL    NEUTROPHILS 80 (H) 32 - 75 %    LYMPHOCYTES 11 (L) 12 - 49 %    MONOCYTES 8 5 - 13 %    EOSINOPHILS 0 0 - 7 %    BASOPHILS 0 0 - 1 %    IMMATURE GRANULOCYTES 1 (H) 0.0 - 0.5 %    ABS. NEUTROPHILS 10.1 (H) 1.8 - 8.0 K/UL    ABS. LYMPHOCYTES 1.4 0.8 - 3.5 K/UL    ABS. MONOCYTES 1.0 0.0 - 1.0 K/UL    ABS. EOSINOPHILS 0.0 0.0 - 0.4 K/UL    ABS. BASOPHILS 0.0 0.0 - 0.1 K/UL    ABS. IMM. GRANS. 0.1 (H) 0.00 - 0.04 K/UL    DF AUTOMATED     GLUCOSE, POC    Collection Time: 08/21/18  3:16 AM   Result Value Ref Range    Glucose (POC) 184 (H) 65 - 100 mg/dL    Performed by [facility]    GLUCOSE, POC    Collection Time: 08/21/18  7:02 AM   Result Value Ref Range    Glucose (POC) 153 (H) 65 - 100 mg/dL    Performed by Juan Carlos wilson [facility]          Impression:     Pustular psoriasis      Recommendation:   Topical triamcinalone 0.1% ointment 3x/day bilateral feet PRN rash. Pt did have elevated WBCs earlier in admission, which could have been a secondary infection or cellulitis of the legs.  F/U in my office to monitor response to topical medication and continue treatment.

## 2018-08-21 NOTE — PROGRESS NOTES
Problem: Mobility Impaired (Adult and Pediatric)  Goal: *Acute Goals and Plan of Care (Insert Text)  Physical Therapy Goals  Initiated 8/21/2018  1. Patient will move from supine to sit and sit to supine  in bed with independence within 7 day(s). 2.  Patient will transfer from bed to chair and chair to bed with independence using the least restrictive device within 7 day(s). 3.  Patient will perform sit to stand with independence within 7 day(s). 4.  Patient will ambulate with supervision/set-up for 50 feet with the least restrictive device within 7 day(s). 5.  Patient will ascend/descend 5 stairs with 1 handrail(s) with minimal assistance/contact guard assist within 7 day(s). physical Therapy EVALUATION  Patient: Bernice Mason (22 y.o. female)  Date: 8/21/2018  Primary Diagnosis: Respiratory failure ([facility] [address]        Precautions:   Contact    ASSESSMENT :  Based on the objective data described below, the patient presents with decreased activity tolerance, generalized weakness, decreased functional mobility skills. Patient agreeable to getting out of bed to chair. Bed mobility with modified independence. Good sitting balance EOB. Sit to stand with supervision. Patient able to take a few steps with CGA and step onto scale. Patient then took a few more steps to chair with CGA/supervision. Up in chair at end of session. Prior to admission patient was limited in mobility. Patient has an aide for ADL's but able to ambulate. Patient has rolling walker at home. Recommend HHPT at discharge but unknown if patient will be willing to participate. .    Patient will benefit from skilled intervention to address the above impairments.   Patients rehabilitation potential is considered to be Fair  Factors which may influence rehabilitation potential include:   []         None noted  []         Mental ability/status  [x]         Medical condition  []         Home/family situation and support systems  [] Safety awareness  []         Pain tolerance/management  []         Other:      PLAN :  Recommendations and Planned Interventions:  []           Bed Mobility Training             []    Neuromuscular Re-Education  [x]           Transfer Training                   []    Orthotic/Prosthetic Training  [x]           Gait Training                         []    Modalities  [x]           Therapeutic Exercises           []    Edema Management/Control  [x]           Therapeutic Activities            []    Patient and Family Training/Education  []           Other (comment):    Frequency/Duration: Patient will be followed by physical therapy  3 times a week to address goals. Discharge Recommendations: Home Health  Further Equipment Recommendations for Discharge: none     SUBJECTIVE:   Patient stated I can sit in the chair.     OBJECTIVE DATA SUMMARY:   HISTORY:    Past Medical History:   Diagnosis Date    Arthritis     Asthma     Breast lump     CAD (coronary artery disease)     Congestive heart failure (HCC)     [facility]     Diabetes ([facility] [address]     Hypertension     Morbid obesity with BMI of 70 and over, adult ([facility] [address]     NSTEMI (non-ST elevated myocardial infarction) ([facility] [address]     SVT (supraventricular tachycardia) ([facility] [address]      Past Surgical History:   Procedure Laterality Date    CARDIAC SURG PROCEDURE UNLIST      Stents    HX  SECTION      HX ORTHOPAEDIC      HX OTHER SURGICAL      cyst removed from back     Prior Level of Function/Home Situation: Requires some assistance/supervision for most mobility. Personal factors and/or comorbidities impacting plan of care: Morbis obesity, medical conditions.     Home Situation  Home Environment: Private residence  # Steps to Enter: 5  Rails to Enter: Yes  Hand Rails : Bilateral  One/Two Story Residence: One story  Living Alone: No  Support Systems: Home care staff, Family member(s) (Aide 7 days a week for 6 hours)  Patient Expects to be Discharged to[de-identified] Patient room  Current DME Used/Available at Home: Walker, rolling, Oxygen, portable (6L O2 24/7)  Tub or Shower Type:  (sponge bathes; with assist)    EXAMINATION/PRESENTATION/DECISION MAKING:   Critical Behavior:  Neurologic State: Alert  Orientation Level: Oriented X4  Cognition: Follows commands, Appropriate decision making, Appropriate safety awareness, Appropriate for age attention/concentration  Safety/Judgement: Awareness of environment, Fall prevention, Home safety  Hearing:   Auditory  Auditory Impairment: None  Skin:  intact  Edema: LE's  Range Of Motion:  AROM: Within functional limits (within limitations of habitus)           PROM: Within functional limits           Strength:    Strength: Generally decreased, functional                    Tone & Sensation:   Tone: Normal              Sensation: Intact               Coordination:  Coordination: Within functional limits  Vision:   Acuity:  (grossly intact)  Functional Mobility:  Bed Mobility:  Rolling: Modified independent  Supine to Sit: Modified independent     Scooting: Supervision  Transfers:  Sit to Stand: Supervision  Stand to Sit: Supervision        Bed to Chair: Supervision              Balance:   Sitting: Intact  Standing: Intact  Ambulation/Gait Training:  Distance (ft): 8 Feet (ft)     Ambulation - Level of Assistance: Contact guard assistance        Gait Abnormalities: Decreased step clearance        Base of Support: Widened     Speed/Danay: Pace decreased (<100 feet/min)  Step Length: Left shortened;Right shortened                                  Functional Measure:  Barthel Index:    Bathin  Bladder: 10  Bowels: 10  Groomin  Dressin  Feeding: 10  Mobility: 5  Stairs: 0  Toilet Use: 5  Transfer (Bed to Chair and Back): 10  Total: 60       Barthel and G-code impairment scale:  Percentage of impairment CH  0% CI  1-19% CJ  20-39% CK  40-59% CL  60-79% CM  80-99% CN  100%   Barthel Score 0-100 100 99-80 79-60 59-40 20-39 1-19   0   Barthel Score 0-20 20 17-19 13-16 9-12 5-8 1-4 0      The Barthel ADL Index: Guidelines  1. The index should be used as a record of what a patient does, not as a record of what a patient could do. 2. The main aim is to establish degree of independence from any help, physical or verbal, however minor and for whatever reason. 3. The need for supervision renders the patient not independent. 4. A patient's performance should be established using the best available evidence. Asking the patient, friends/relatives and nurses are the usual sources, but direct observation and common sense are also important. However direct testing is not needed. 5. Usually the patient's performance over the preceding 24-48 hours is important, but occasionally longer periods will be relevant. 6. Middle categories imply that the patient supplies over 50 per cent of the effort. 7. Use of aids to be independent is allowed. Arianna Jackson., Barthel, D.W. ([id number]). Functional evaluation: the Barthel Index. [address] [facility] (14)2. Ranulfo Marin rickie PEACE Hernandes, David West., Shavon Long., [place], [address] (1999). Measuring the change indisability after inpatient rehabilitation; comparison of the responsiveness of the Barthel Index and Functional [place] Measure. Journal of Neurology, Neurosurgery, and Psychiatry, 66(4), 733-487. Lynda Hyde NSHEILA.A, AGUSTO Zarco, & Corine Hernandez, M.A. (2004.) Assessment of post-stroke quality of life in cost-effectiveness studies: The usefulness of the Barthel Index and the EuroQoL-5D. Quality of Life Research, 13, 107-33         G codes: In compliance with CMSs Claims Based Outcome Reporting, the following G-code set was chosen for this patient based on their primary functional limitation being treated:     The outcome measure chosen to determine the severity of the functional limitation was the Barthel index with a score of 60/100 which was correlated with the impairment scale.    ? Mobility - Walking and Moving Around:     - CURRENT STATUS: CJ - 20%-39% impaired, limited or restricted    - GOAL STATUS: CI - 1%-19% impaired, limited or restricted    - D/C STATUS:  ---------------To be determined---------------      Physical Therapy Evaluation Charge Determination   History Examination Presentation Decision-Making   MEDIUM  Complexity : 1-2 comorbidities / personal factors will impact the outcome/ POC  LOW Complexity : 1-2 Standardized tests and measures addressing body structure, function, activity limitation and / or participation in recreation  LOW Complexity : Stable, uncomplicated  LOW Complexity : FOTO score of       Based on the above components, the patient evaluation is determined to be of the following complexity level: LOW     Pain:  Pain Scale 1: Numeric (0 - 10)  Pain Intensity 1: 6  Pain Location 1: Abdomen  Pain Orientation 1: Left;Right  Pain Description 1: Aching  Pain Intervention(s) 1: Refused (Ordered pain med offered and patient refused.)  Activity Tolerance:   fair  Please refer to the flowsheet for vital signs taken during this treatment. After treatment:   [x]         Patient left in no apparent distress sitting up in chair  []         Patient left in no apparent distress in bed  [x]         Call bell left within reach  [x]         Nursing notified  []         Caregiver present  []         Bed alarm activated    COMMUNICATION/EDUCATION:   The patients plan of care was discussed with: Occupational Therapist and Registered Nurse. [x]         Fall prevention education was provided and the patient/caregiver indicated understanding. []         Patient/family have participated as able in goal setting and plan of care. []         Patient/family agree to work toward stated goals and plan of care. [x]         Patient understands intent and goals of therapy, but is neutral about his/her participation.   []         Patient is unable to participate in goal setting and plan of care.     Thank you for this referral.  Gerardo Quintana, PT   Time Calculation: 17 mins

## 2018-08-21 NOTE — PROGRESS NOTES
Occupational Therapy EVALUATION/discharge  Patient: Petey Friedman (15 y.o. female)  Date: 2018  Primary Diagnosis: Respiratory failure ([facility] [address]        Precautions:   Contact    ASSESSMENT:   Based on the objective data described below, the patient presents with baseline ADLs and receives assist at baseline for ADLs. Pt was able to perform bed mobility without assist and walk to and step up on scale for weight with supervision. Total assist to don socks on feet but pt does not perform this at baseline. Good balance dynamically. Pt receives assist with all lower body ADLS at at baseline and further skilled acute occupational therapy is not indicated at this time. Discharge Recommendations: home without services and continued aid assist  Further Equipment Recommendations for Discharge: none      SUBJECTIVE:   Patient stated My aid helps me.     OBJECTIVE DATA SUMMARY:   HISTORY:   Past Medical History:   Diagnosis Date    Arthritis     Asthma     Breast lump     CAD (coronary artery disease)     Congestive heart failure (HCC)     [facility])     Diabetes ([facility] [address]     Hypertension     Morbid obesity with BMI of 70 and over, adult ([facility] [address]     NSTEMI (non-ST elevated myocardial infarction) ([facility] [address]     SVT (supraventricular tachycardia) ([facility] [address]      Past Surgical History:   Procedure Laterality Date    CARDIAC SURG PROCEDURE UNLIST      Stents    HX  SECTION      HX ORTHOPAEDIC      HX OTHER SURGICAL      cyst removed from back       Prior Level of Function/Environment/Context: has a aid 7 days a week for 6 hours that performs household tasks, assists with tolieting/bathing and lower body dressing; pt performs UB ADLs without assist; does not shower in bathroom as she reports something in the bathroom (mold??) makes it difficult to breathe; to clean up after BM on her own she will lay in bed on her side and use chux, sponge bathes and uses BSC at baseline; pts children assist as needed but do no live with pt    Expanded or extensive additional review of patient history:     Home Situation  Home Environment: Private residence  # Steps to Enter: 5  Rails to Enter: Yes  Hand Rails : Bilateral  One/Two Story Residence: One story  Living Alone: No  Support Systems: Home care staff, Family member(s) (Aide 7 days a week for 6 hours)  Patient Expects to be Discharged to[de-identified] Patient room  Current DME Used/Available at Home: Walker, rolling, Oxygen, portable (6L O2 24/7)  Tub or Shower Type:  (sponge bathes; with assist)    Hand dominance: Right    EXAMINATION OF PERFORMANCE DEFICITS:  Cognitive/Behavioral Status:  Neurologic State: Alert  Orientation Level: Oriented X4  Cognition: Follows commands; Appropriate decision making; Appropriate safety awareness; Appropriate for age attention/concentration  Perception: Appears intact  Perseveration: No perseveration noted  Safety/Judgement: Awareness of environment; Fall prevention;Home safety    Hearing: Auditory  Auditory Impairment: None    Vision/Perceptual:                           Acuity:  (grossly intact)         Range of Motion:    AROM: Within functional limits (within limitations of habitus)                         Strength:    Strength: Generally decreased, functional                Coordination:  Coordination: Within functional limits  Fine Motor Skills-Upper: Left Intact; Right Intact    Gross Motor Skills-Upper: Left Intact; Right Intact    Tone & Sensation:    Tone: Normal  Sensation: Intact                      Balance:  Sitting: Intact  Standing: Intact    Functional Mobility and Transfers for ADLs:  Bed Mobility:  Rolling: Modified independent  Scooting: Supervision    Transfers:  Sit to Stand: Supervision  Stand to Sit: Supervision  Bed to Chair: Supervision  Toilet Transfer : Supervision    ADL Assessment:  Feeding: Independent    Oral Facial Hygiene/Grooming: Independent    Bathing:  Moderate assistance;Maximum assistance (LB and jae/anal areas; gets assist at baseline)    Upper Body Dressing: Supervision    Lower Body Dressing: Moderate assistance    Toileting: Moderate assistance                ADL Intervention and task modifications:     See assessment  Cognitive Retraining  Safety/Judgement: Awareness of environment; Fall prevention;Home safety    Functional Measure:  Barthel Index:    Bathin  Bladder: 10  Bowels: 10  Groomin  Dressin  Feeding: 10  Mobility: 5  Stairs: 0  Toilet Use: 5  Transfer (Bed to Chair and Back): 10  Total: 60       Barthel and G-code impairment scale:  Percentage of impairment CH  0% CI  1-19% CJ  20-39% CK  40-59% CL  60-79% CM  80-99% CN  100%   Barthel Score 0-100 100 99-80 79-60 59-40 20-39 1-19   0   Barthel Score 0-20 20 17-19 13-16 9-12 5-8 1-4 0      The Barthel ADL Index: Guidelines  1. The index should be used as a record of what a patient does, not as a record of what a patient could do. 2. The main aim is to establish degree of independence from any help, physical or verbal, however minor and for whatever reason. 3. The need for supervision renders the patient not independent. 4. A patient's performance should be established using the best available evidence. Asking the patient, friends/relatives and nurses are the usual sources, but direct observation and common sense are also important. However direct testing is not needed. 5. Usually the patient's performance over the preceding 24-48 hours is important, but occasionally longer periods will be relevant. 6. Middle categories imply that the patient supplies over 50 per cent of the effort. 7. Use of aids to be independent is allowed. Kieran Royal., Barthel, D.W. ([id number]). Functional evaluation: the Barthel Index. [address] [facility] (14)2. PEACE Morgan, Chetna Harrison., Courtney Blue, Kimberly, [facility] ().  Measuring the change indisability after inpatient rehabilitation; comparison of the responsiveness of the Barthel Index and Functional [place] Measure. Journal of Neurology, Neurosurgery, and Psychiatry, 66(4), 427-603. KRIS Madison, AGUSTO Zarco, & Snow Manzo M.A. (2004.) Assessment of post-stroke quality of life in cost-effectiveness studies: The usefulness of the Barthel Index and the EuroQoL-5D. Quality of Life Research, 13, 759-65         G codes: In compliance with CMSs Claims Based Outcome Reporting, the following G-code set was chosen for this patient based on their primary functional limitation being treated: The outcome measure chosen to determine the severity of the functional limitation was the barthel with a score of 60/100 which was correlated with the impairment scale. ? Self Care:     - CURRENT STATUS: CJ - 20%-39% impaired, limited or restricted    - GOAL STATUS: CJ - 20%-39% impaired, limited or restricted    - D/C STATUS:  CJ - 20%-39% impaired, limited or restricted     Occupational Therapy Evaluation Charge Determination   History Examination Decision-Making   MEDIUM Complexity : Expanded review of history including physical, cognitive and psychosocial  history  LOW Complexity : 1-3 performance deficits relating to physical, cognitive , or psychosocial skils that result in activity limitations and / or participation restrictions  MEDIUM Complexity : Patient may present with comorbidities that affect occupational performnce.  Miniml to moderate modification of tasks or assistance (eg, physical or verbal ) with assesment(s) is necessary to enable patient to complete evaluation       Based on the above components, the patient evaluation is determined to be of the following complexity level: MEDIUM  Pain:  Pain Scale 1: Numeric (0 - 10)  Pain Intensity 1: 6  Pain Location 1: Abdomen  Pain Orientation 1: Left;Right  Pain Description 1: Aching  Pain Intervention(s) 1: Refused (Ordered pain med offered and patient refused.)  Activity Tolerance:     Please refer to the flowsheet for vital signs taken during this treatment. After treatment:   [x]  Patient left in no apparent distress sitting up in chair  []  Patient left in no apparent distress in bed  [x]  Call bell left within reach  [x]  Nursing notified  []  Caregiver present  []  Bed alarm activated    COMMUNICATION/EDUCATION:   Communication/Collaboration:  [x]      Home safety education was provided and the patient/caregiver indicated understanding. [x]      Patient have participated as able and agree with findings and recommendations. []      Patient is unable to participate in plan of care at this time.   Findings and recommendations were discussed with: Physical Therapist, Registered Nurse and patient    Lydia Arndt OTR/L  Time Calculation: 16 mins

## 2018-08-21 NOTE — CONSULTS
Gastroenterology Consult     Referring Physician: Mary Mcdowell NP    Consult Date: 2018     Subjective:     Chief Complaint: abd pain    History of Present Illness: Anirudh Lange is a morbidly obese 57 y.o. female with PMH COPD, GEN, CAD who is seen in consultation for elevated lipase. Patient presented to the ER with chest pain, SOB and R foot pain. She was admitted with R foot cellulitis, and was ruled out for osteomyelitis. She also received a breast biopsy yesterday for a breat mass diagnosed recently by mammogram suspicious for malignancy. Last night about 2 hrs after dinner she developed LUQ/epigastric pain. It did not radiate to the back last night but she has had pain do that in the past.  She did have some nausea but no vomiting. The pain spontaneously resolved within the hour and she hasn't had any pain since. Lipase was checked and was >3000. U/S Abd was negative for gallstones but pancreas not well visualized. LFT's normal.  Denies ETOH. No personal hx of pancreatitis. No FH of pancreatic disease. She does have hx of high cholesterol but isn't sure about her triglycerides. Her calcium was slightly elevated on admission but normalized after hydration. Is on furosemide and pravastatin. The chest pain that she had on admission was intermittent and would last several minutes. She is anxious to eat.       Past Medical History:   Diagnosis Date    Arthritis     Asthma     Breast lump     CAD (coronary artery disease)     Congestive heart failure (HCC)     COPD (chronic obstructive pulmonary disease) (HCC)     Diabetes [facility]     Hypertension     Morbid obesity with BMI of 70 and over, adult ([facility]     NSTEMI (non-ST elevated myocardial infarction) ([address]     SVT (supraventricular tachycardia) ([facility]      Past Surgical History:   Procedure Laterality Date    CARDIAC SURG PROCEDURE UNLIST      Stents    HX  SECTION      HX ORTHOPAEDIC      HX OTHER SURGICAL      cyst removed from back      Family History   Problem Relation Age of Onset    Heart Disease Mother     Heart Disease Father     Heart Disease Sister     Breast Cancer Maternal Grandmother     Breast Cancer Paternal Grandmother      Social History   Substance Use Topics    Smoking status: Current Every Day Smoker     Packs/day: 0.25     Types: Cigarettes    Smokeless tobacco: Never Used      Comment: Patient states \"I  aint smoking no more d*mn cigarettes after yesterday\" 01/26/2018    Alcohol use No      No Known Allergies  Current Facility-Administered Medications   Medication Dose Route Frequency    triamcinolone acetonide (KENALOG) 0.1 % ointment   Topical TID    ammonium lactate (LAC-HYDRIN) 12 % lotion   Topical BID    predniSONE (DELTASONE) tablet 20 mg  20 mg Oral DAILY WITH BREAKFAST    amoxicillin-clavulanate (AUGMENTIN) 875-125 mg per tablet 1 Tab  1 Tab Oral BID WITH MEALS    nystatin (MYCOSTATIN) 100,000 unit/gram powder   Topical BID    oxyCODONE IR (ROXICODONE) tablet 5 mg  5 mg Oral Q4H PRN    albuterol (PROVENTIL VENTOLIN) nebulizer solution 2.5 mg  2.5 mg Nebulization Q2H PRN    aspirin chewable tablet 81 mg  81 mg Oral DAILY    cetirizine (ZYRTEC) tablet 10 mg  10 mg Oral DAILY PRN    furosemide (LASIX) tablet 40 mg  40 mg Oral ACB&D    glyBURIDE (DIABETA) tablet 5 mg  5 mg Oral ACB    hydrOXYzine pamoate (VISTARIL) capsule 50 mg  50 mg Oral Q8H PRN    insulin glargine (LANTUS) injection 15 Units  15 Units SubCUTAneous DAILY    pravastatin (PRAVACHOL) tablet 40 mg  40 mg Oral QHS    metoprolol succinate (TOPROL-XL) XL tablet 25 mg  25 mg Oral BID    sodium chloride (NS) flush 5-10 mL  5-10 mL IntraVENous Q8H    sodium chloride (NS) flush 5-10 mL  5-10 mL IntraVENous PRN    acetaminophen (TYLENOL) tablet 650 mg  650 mg Oral Q4H PRN    ondansetron (ZOFRAN) injection 4 mg  4 mg IntraVENous Q4H PRN    bisacodyl (DULCOLAX) tablet 5 mg  5 mg Oral DAILY PRN    heparin (porcine) injection 5,000 Units  5,000 Units SubCUTAneous Q8H    insulin lispro (HUMALOG) injection   SubCUTAneous AC&HS    glucose chewable tablet 16 g  4 Tab Oral PRN    dextrose (D50W) injection syrg 12.5-25 g  12.5-25 g IntraVENous PRN    glucagon (GLUCAGEN) injection 1 mg  1 mg IntraMUSCular PRN    cyclobenzaprine (FLEXERIL) tablet 5 mg  5 mg Oral BID    guaiFENesin ER (MUCINEX) tablet 600 mg  600 mg Oral BID    albuterol-ipratropium (DUO-NEB) 2.5 MG-0.5 MG/3 ML  3 mL Nebulization Q6H RT        Review of Systems:  A detailed 10 organ review of systems is obtained with pertinent positives as listed in the History of Present Illness and Past Medical History. A detailed review of systems was performed as follows:  Constitutional:  Negative  Eyes:  No ocular sensitivity to the sun, blurred vision or double vision. ENMT:  No nose or sinus problems. Respiratory: +sob  Cardiac: +chest pain  Gastrointestinal:  See history of the present illness  :   No pain with urination or hematuria  Musculoskeletal:  See HPI  Endocrine:  + diabetes  Psychiatric: No depression or feeling blue  Integumentary:  See HPI  Neurologic:  No stroke or seizure; no numbness or tingling of the extremities. Heme-Lymphatic:  No history of anemia, no unexplained lumps or bumps  \    Objective:     Physical Exam:  Visit Vitals    /54 (BP 1 Location: Right arm, BP Patient Position: At rest)    Pulse 71    Temp 98.6 °F (37 °C)    Resp 20    Ht 5' 6\" (1.676 m)    Wt (!) 186.6 kg (411 lb 6.4 oz)    SpO2 96%    BMI 66.4 kg/m2        Gen: morbidly obese, appears chronically ill, )2 in place, and older than stated age  Skin:  Brawny skin changes to lower extremities  HEENT: Sclerae anicteric. No abnormal pigmentation of the lips. Cardiovascular: Distant heat tones. Regular rate and rhythm. No murmurs, gallops, or rubs. Respiratory:  Comfortable breathing with no accessory muscle use.  Clear breath sounds with no wheezes, rales, or rhonchi. GI:  Abdomen morbidly obese, nondistended, soft. Normal active bowel sounds. Tender in epigastrium without guarding or reboudning. No enlargement of the liver or spleen. No masses palpable. Rectal:  Deferred  Musculoskeletal:  No pitting edema  Neurological:  Gross memory appears intact. Patient is alert and oriented. Psychiatric:  agitated  Lymphatic:  No cervical or supraclavicular adenopathy. Lab/Data Review:  Lab Results   Component Value Date/Time    Lipase >3000 (H) 08/21/2018 09:54 AM     Lab Results   Component Value Date/Time    Sodium 136 08/21/2018 02:21 AM    Potassium 3.7 08/21/2018 02:21 AM    Chloride 100 08/21/2018 02:21 AM    CO2 32 08/21/2018 02:21 AM    Anion gap 4 (L) 08/21/2018 02:21 AM    Glucose 189 (H) 08/21/2018 02:21 AM    BUN 19 08/21/2018 02:21 AM    Creatinine 0.78 08/21/2018 02:21 AM    BUN/Creatinine ratio 24 (H) 08/21/2018 02:21 AM    GFR est AA >60 08/21/2018 02:21 AM    GFR est non-AA >60 08/21/2018 02:21 AM    Calcium 9.8 08/21/2018 02:21 AM    Bilirubin, total 0.5 08/21/2018 09:54 AM    AST (SGOT) 20 08/21/2018 09:54 AM    Alk. phosphatase 80 08/21/2018 09:54 AM    Protein, total 8.0 08/21/2018 09:54 AM    Albumin 2.8 (L) 08/21/2018 09:54 AM    Globulin 5.2 (H) 08/21/2018 09:54 AM    A-G Ratio 0.5 (L) 08/21/2018 09:54 AM    ALT (SGPT) 19 08/21/2018 09:54 AM     US Results (most recent):    Results from [place] encounter on 08/19/18   US ABD COMP   Narrative INDICATION:  Abdominal pain    Real-time sonographic imaging of the abdomen was performed. The liver is  enlarged but otherwise unremarkable. .. No focal lesion or biliary dilatation. The gallbladder is unremarkable. Kidneys are normal in size and appearance  without evidence of mass lesion, hydronephrosis, or calcification. The spleen  was not visualized, perhaps due to body habitus. The visualized portion of the head and body of the pancreas, and IVC are  unremarkable.  The aorta was not visualized due to body habitus. .  No free fluid. Impression Impression:  1. Slightly limited due to body habitus. There is hepatomegaly. Otherwise  negative. Lab Results   Component Value Date/Time    WBC 12.6 (H) 08/21/2018 02:22 AM    HGB 11.6 08/21/2018 02:22 AM    HCT 37.9 08/21/2018 02:22 AM    PLATELET 241 80/44/5086 02:22 AM    MCV 93.6 08/21/2018 02:22 AM         Assessment/Plan:     Active Problems:    Respiratory failure ([address] (8/19/2018)      Elevated lipase (8/21/2018)      Abdominal pain (8/21/2018)         Patient's sxs are a little atypical for acute pancreatitis in that the pain was so short lived. However her lipase is >3000 and the location was epigastric and she still has some epigastric tenderness. I ordered a CT abd with contrast to further evaluate for changes consistent with acute pancreatitis. If confirmed, we will need to identify the etiology. She has no evidence of gallstones and denies ETOH. She may have high triglycerides given hx of elevated cholesterol and I would order a fasting lipid panel. We also need to consider medications, ie, furosemide and pravastatin which she is taking and are class I medications for causing pancreatitis. Clear liquids after CT. Consider advancing diet further if no CT evidence of pancreatitis.       SAGE Varma  08/21/18  2:57 PM

## 2018-08-22 LAB
ANION GAP SERPL CALC-SCNC: 4 MMOL/L (ref 5–15)
BACTERIA SPEC CULT: ABNORMAL
BUN SERPL-MCNC: 15 MG/DL (ref 6–20)
BUN/CREAT SERPL: 21 (ref 12–20)
CALCIUM SERPL-MCNC: 10.2 MG/DL (ref 8.5–10.1)
CHLORIDE SERPL-SCNC: 100 MMOL/L (ref 97–108)
CHOLEST SERPL-MCNC: 152 MG/DL
CO2 SERPL-SCNC: 32 MMOL/L (ref 21–32)
CREAT SERPL-MCNC: 0.7 MG/DL (ref 0.55–1.02)
GLUCOSE BLD STRIP.AUTO-MCNC: 130 MG/DL (ref 65–100)
GLUCOSE BLD STRIP.AUTO-MCNC: 137 MG/DL (ref 65–100)
GLUCOSE BLD STRIP.AUTO-MCNC: 177 MG/DL (ref 65–100)
GLUCOSE BLD STRIP.AUTO-MCNC: 208 MG/DL (ref 65–100)
GLUCOSE SERPL-MCNC: 148 MG/DL (ref 65–100)
GRAM STN SPEC: ABNORMAL
GRAM STN SPEC: ABNORMAL
HDLC SERPL-MCNC: 31 MG/DL
HDLC SERPL: 4.9 {RATIO} (ref 0–5)
LDLC SERPL CALC-MCNC: 77.6 MG/DL (ref 0–100)
LIPASE SERPL-CCNC: 692 U/L (ref 73–393)
LIPID PROFILE,FLP: ABNORMAL
POTASSIUM SERPL-SCNC: 4 MMOL/L (ref 3.5–5.1)
SERVICE CMNT-IMP: ABNORMAL
SODIUM SERPL-SCNC: 136 MMOL/L (ref 136–145)
TRIGL SERPL-MCNC: 217 MG/DL (ref ?–150)
VLDLC SERPL CALC-MCNC: 43.4 MG/DL

## 2018-08-22 PROCEDURE — 74011636637 HC RX REV CODE- 636/637: Performed by: INTERNAL MEDICINE

## 2018-08-22 PROCEDURE — 80048 BASIC METABOLIC PNL TOTAL CA: CPT | Performed by: INTERNAL MEDICINE

## 2018-08-22 PROCEDURE — 74011000250 HC RX REV CODE- 250: Performed by: NURSE PRACTITIONER

## 2018-08-22 PROCEDURE — 74011250637 HC RX REV CODE- 250/637: Performed by: PODIATRIST

## 2018-08-22 PROCEDURE — 83690 ASSAY OF LIPASE: CPT | Performed by: NURSE PRACTITIONER

## 2018-08-22 PROCEDURE — 74011250637 HC RX REV CODE- 250/637: Performed by: GENERAL ACUTE CARE HOSPITAL

## 2018-08-22 PROCEDURE — 94640 AIRWAY INHALATION TREATMENT: CPT

## 2018-08-22 PROCEDURE — 74011250637 HC RX REV CODE- 250/637: Performed by: INTERNAL MEDICINE

## 2018-08-22 PROCEDURE — 94760 N-INVAS EAR/PLS OXIMETRY 1: CPT

## 2018-08-22 PROCEDURE — 74011000250 HC RX REV CODE- 250: Performed by: INTERNAL MEDICINE

## 2018-08-22 PROCEDURE — 80061 LIPID PANEL: CPT | Performed by: SPECIALIST

## 2018-08-22 PROCEDURE — 74011250636 HC RX REV CODE- 250/636: Performed by: INTERNAL MEDICINE

## 2018-08-22 PROCEDURE — 74011250637 HC RX REV CODE- 250/637: Performed by: NURSE PRACTITIONER

## 2018-08-22 PROCEDURE — 82962 GLUCOSE BLOOD TEST: CPT

## 2018-08-22 PROCEDURE — 65660000000 HC RM CCU STEPDOWN

## 2018-08-22 PROCEDURE — 36415 COLL VENOUS BLD VENIPUNCTURE: CPT | Performed by: INTERNAL MEDICINE

## 2018-08-22 PROCEDURE — 77010033678 HC OXYGEN DAILY

## 2018-08-22 RX ORDER — IPRATROPIUM BROMIDE AND ALBUTEROL SULFATE 2.5; .5 MG/3ML; MG/3ML
3 SOLUTION RESPIRATORY (INHALATION)
Status: DISCONTINUED | OUTPATIENT
Start: 2018-08-22 | End: 2018-08-23 | Stop reason: HOSPADM

## 2018-08-22 RX ORDER — POLYETHYLENE GLYCOL 3350 17 G/17G
17 POWDER, FOR SOLUTION ORAL DAILY
Status: DISCONTINUED | OUTPATIENT
Start: 2018-08-22 | End: 2018-08-23 | Stop reason: HOSPADM

## 2018-08-22 RX ORDER — DOCUSATE SODIUM 100 MG/1
100 CAPSULE, LIQUID FILLED ORAL 2 TIMES DAILY
Status: DISCONTINUED | OUTPATIENT
Start: 2018-08-22 | End: 2018-08-23 | Stop reason: HOSPADM

## 2018-08-22 RX ORDER — LIDOCAINE 50 MG/G
2 PATCH TOPICAL EVERY 24 HOURS
Status: DISCONTINUED | OUTPATIENT
Start: 2018-08-22 | End: 2018-08-23 | Stop reason: HOSPADM

## 2018-08-22 RX ADMIN — DOCUSATE SODIUM 100 MG: 100 CAPSULE, LIQUID FILLED ORAL at 16:53

## 2018-08-22 RX ADMIN — ASPIRIN 81 MG 81 MG: 81 TABLET ORAL at 08:52

## 2018-08-22 RX ADMIN — TRIAMCINOLONE ACETONIDE: 1 OINTMENT TOPICAL at 16:00

## 2018-08-22 RX ADMIN — TRIAMCINOLONE ACETONIDE: 1 OINTMENT TOPICAL at 09:00

## 2018-08-22 RX ADMIN — INSULIN GLARGINE 15 UNITS: 100 INJECTION, SOLUTION SUBCUTANEOUS at 09:00

## 2018-08-22 RX ADMIN — DOCUSATE SODIUM 100 MG: 100 CAPSULE, LIQUID FILLED ORAL at 11:45

## 2018-08-22 RX ADMIN — IPRATROPIUM BROMIDE AND ALBUTEROL SULFATE 3 ML: .5; 3 SOLUTION RESPIRATORY (INHALATION) at 14:24

## 2018-08-22 RX ADMIN — OXYCODONE HYDROCHLORIDE 5 MG: 5 TABLET ORAL at 08:56

## 2018-08-22 RX ADMIN — INSULIN LISPRO 3 UNITS: 100 INJECTION, SOLUTION INTRAVENOUS; SUBCUTANEOUS at 16:30

## 2018-08-22 RX ADMIN — Medication: at 18:00

## 2018-08-22 RX ADMIN — PREDNISONE 20 MG: 20 TABLET ORAL at 08:52

## 2018-08-22 RX ADMIN — CYCLOBENZAPRINE HYDROCHLORIDE 5 MG: 10 TABLET, FILM COATED ORAL at 16:52

## 2018-08-22 RX ADMIN — POLYETHYLENE GLYCOL 3350 17 G: 17 POWDER, FOR SOLUTION ORAL at 11:45

## 2018-08-22 RX ADMIN — NYSTATIN: 100000 POWDER TOPICAL at 18:00

## 2018-08-22 RX ADMIN — METOPROLOL SUCCINATE 25 MG: 50 TABLET, EXTENDED RELEASE ORAL at 16:54

## 2018-08-22 RX ADMIN — HEPARIN SODIUM 5000 UNITS: 5000 INJECTION INTRAVENOUS; SUBCUTANEOUS at 22:44

## 2018-08-22 RX ADMIN — Medication: at 09:00

## 2018-08-22 RX ADMIN — GUAIFENESIN 600 MG: 600 TABLET, EXTENDED RELEASE ORAL at 16:53

## 2018-08-22 RX ADMIN — AMOXICILLIN AND CLAVULANATE POTASSIUM 1 TABLET: 875; 125 TABLET, FILM COATED ORAL at 08:56

## 2018-08-22 RX ADMIN — AMOXICILLIN AND CLAVULANATE POTASSIUM 1 TABLET: 875; 125 TABLET, FILM COATED ORAL at 16:51

## 2018-08-22 RX ADMIN — GLYBURIDE 5 MG: 5 TABLET ORAL at 08:56

## 2018-08-22 RX ADMIN — HEPARIN SODIUM 5000 UNITS: 5000 INJECTION INTRAVENOUS; SUBCUTANEOUS at 07:02

## 2018-08-22 RX ADMIN — NYSTATIN: 100000 POWDER TOPICAL at 09:00

## 2018-08-22 RX ADMIN — CYCLOBENZAPRINE HYDROCHLORIDE 5 MG: 10 TABLET, FILM COATED ORAL at 08:52

## 2018-08-22 RX ADMIN — Medication 10 ML: at 14:00

## 2018-08-22 RX ADMIN — GUAIFENESIN 600 MG: 600 TABLET, EXTENDED RELEASE ORAL at 08:56

## 2018-08-22 RX ADMIN — HEPARIN SODIUM 5000 UNITS: 5000 INJECTION INTRAVENOUS; SUBCUTANEOUS at 14:12

## 2018-08-22 RX ADMIN — Medication 10 ML: at 22:44

## 2018-08-22 RX ADMIN — Medication 10 ML: at 06:00

## 2018-08-22 RX ADMIN — PRAVASTATIN SODIUM 40 MG: 40 TABLET ORAL at 22:48

## 2018-08-22 RX ADMIN — METOPROLOL SUCCINATE 25 MG: 50 TABLET, EXTENDED RELEASE ORAL at 08:52

## 2018-08-22 NOTE — PROGRESS NOTES
F/U for acute pancreatitis    S: Ms. Yariel Fabian was seen by me today during rounds. At this time, she is resting + comfortably. She has no abdominal pain.  + foot pain. The patient has no  new complaints today. Please see admission consult for details of ROS; there are no other changes today. O: Blood pressure 113/67, pulse 72, temperature 98 °F (36.7 °C), resp. rate 18, height 5' 6\" (1.676 m), weight (!) 183.7 kg (405 lb), SpO2 95 %. Gen: Patient is in no acute distress. There is no jaundice. Lungs: Clear to auscultation bilaterally . Heart:+RRR. Abd: Soft, non tender, non-distended, bowel sounds present. Extremities: Warm. Cross sectional imaging:  See ct report    Lab Results   Component Value Date/Time    WBC 12.6 (H) 08/21/2018 02:22 AM    HGB 11.6 08/21/2018 02:22 AM    HCT 37.9 08/21/2018 02:22 AM    PLATELET 175 06/91/0327 02:22 AM    MCV 93.6 08/21/2018 02:22 AM     Lab Results   Component Value Date/Time    Sodium 136 08/22/2018 02:15 AM    Potassium 4.0 08/22/2018 02:15 AM    Chloride 100 08/22/2018 02:15 AM    CO2 32 08/22/2018 02:15 AM    Anion gap 4 (L) 08/22/2018 02:15 AM    Glucose 148 (H) 08/22/2018 02:15 AM    BUN 15 08/22/2018 02:15 AM    Creatinine 0.70 08/22/2018 02:15 AM    BUN/Creatinine ratio 21 (H) 08/22/2018 02:15 AM    GFR est AA >60 08/22/2018 02:15 AM    GFR est non-AA >60 08/22/2018 02:15 AM    Calcium 10.2 (H) 08/22/2018 02:15 AM    Bilirubin, total 0.5 08/21/2018 09:54 AM    AST (SGOT) 20 08/21/2018 09:54 AM    Alk.  phosphatase 80 08/21/2018 09:54 AM    Protein, total 8.0 08/21/2018 09:54 AM    Albumin 2.8 (L) 08/21/2018 09:54 AM    Globulin 5.2 (H) 08/21/2018 09:54 AM    A-G Ratio 0.5 (L) 08/21/2018 09:54 AM    ALT (SGPT) 19 08/21/2018 09:54 AM     Lab Results   Component Value Date/Time    Lipase 692 (H) 08/22/2018 02:15 AM     Lab Results   Component Value Date/Time    Cholesterol, total 152 08/22/2018 02:15 AM    HDL Cholesterol 31 08/22/2018 02:15 AM LDL, calculated 77.6 08/22/2018 02:15 AM    VLDL, calculated 43.4 08/22/2018 02:15 AM    Triglyceride 217 (H) 08/22/2018 02:15 AM    CHOL/HDL Ratio 4.9 08/22/2018 02:15 AM         A: Active Problems:    Respiratory failure ([address] (8/19/2018)      Elevated lipase (8/21/2018)      Abdominal pain (8/21/2018)        Comment: This is acute pancreatitis, mild. No evidence of elevated tg or gallstone  P:  I dont think she would fit in mri machine  Stop statin  Stop furosemide   Both class I    Ok for low fat diet  Dc plans per hospitalist    If she had a second attack off the above drugs, I would do eus. Follow up post dc with pcp  I discussed with patient.

## 2018-08-22 NOTE — PROGRESS NOTES
Have checked with this patient to give her her nebulizer treatment and put her on her BIPAP  Several times she has asked me to come back each time, she is now refusing to take her nebulizer treatment and will not put on BIPAP, BIPAP  Is on stand by and her RN said that she would put it on Ms Alex Dey when she was ready.

## 2018-08-22 NOTE — PROGRESS NOTES
Spiritual Care Assessment/Progress Note  [facility]      NAME: Nelson Sanchez      MRN: [id number]  AGE: 57 y.o.  SEX: female  Holiness Affiliation: Pentecostal   Language: English     8/22/2018     Total Time (in minutes): 30     Spiritual Assessment begun in [facility] through conversation with:         [x]Patient        [] Family    [] Friend(s)        Reason for Consult: Request by staff     Spiritual beliefs: (Please include comment if needed)     [] Identifies with a daryn tradition:         [] Supported by a daryn community:            [] Claims no spiritual orientation:           [] Seeking spiritual identity:                [x] Adheres to an individual form of spirituality:           [] Not able to assess:                           Identified resources for coping:      [x] Prayer                               [] Music                  [] Guided Imagery     [] Family/friends                 [] Pet visits     [] Devotional reading                         [] Unknown     [] Other:                                                Interventions offered during this visit: (See comments for more details)    Patient Interventions: Affirmation of daryn, Affirmation of emotions/emotional suffering, Catharsis/review of pertinent events in supportive environment, Iconic (affirming the presence of God/Higher Power), Coping skills reviewed/reinforced, Initial/Spiritual assessment, patient floor, Normalization of emotional/spiritual concerns, Prayer (assurance of)           Plan of Care:     [x] Support spiritual and/or cultural needs    [] Support AMD and/or advance care planning process      [] Support grieving process   [] Coordinate Rites and/or Rituals    [] Coordination with community clergy   [] No spiritual needs identified at this time   [] Detailed Plan of Care below (See Comments)  [] Make referral to Music Therapy  [] Make referral to Pet Therapy     [] Make referral to Addiction services  [] Make referral to [facility]  [] Make referral to Spiritual Care Partner  [] No future visits requested        [] Follow up visits as needed     Comments:  Responded to staff request to visit patient on [facility] unit with new diagnosis. Patient was seated in her recliner at bedside. Ms. Haresh Diaz appears overwhelmed with the news she received today, especially in light of her multiple other medical challenges. She expressed some anger toward family members as they do not seem to be very supportive of her. Her sentiment seemed to be that they are too busy with their own lives to provide any assistance to her. Her mother  in March and it appears she is still grieving. Though her mother used to live with her, once patient got sick, mother had to live with another family member. Perhaps there is underlying guilt that she couldn't help her mother. She also expressed some disillusionment with her [employer]. She explained because of that she no longer attends. Yahaira's voice softened as we continued to talk. She began talking relationships that have been meaningful in her life. Unfortunately, the two most meaningful ones were her mother and her boyfriend. Her boyfriend  of cancer, but she did not mention how long ago. Normalized Yahaira's grief, her sense of being overwhelmed with today's news, and acknowledgement of minimal support system. She was receptive to assurance of prayer. Plan to follow up tomorrow.   Darleen Danielle, MPS, [address] [facility], [address] [place] [address] Road Paging Service  [phone])

## 2018-08-22 NOTE — PROGRESS NOTES
Final pathology results posted. Dr. Lila Gamble notified.  Will page Dr. Felicita Pascal (hospitalist) and give pathology results (positive) per Dr. Melvina Panda request.

## 2018-08-22 NOTE — PROGRESS NOTES
Hospitalist Progress Note    NAME: Dc Thornton   :  1970   MRN:  [id number]       Interim Hospital Summary: 57 y.o. female whom presented on 2018 with      Assessment / Plan:  Newly dx RUOQ Intraductal Carcinoma of the Breast  - BX revealed Invasive ductal carcinoma, nuclear grade 3, involving several cores at least 0.6 cm Ductal carcinoma in situ, nuclear grade 2, cribriform and micropapillary pattern with comedonecrosis and calcifications. hormone receptors and HER-2 by IHC is pending  - appreciate heme/onc input; tumor marker will be checked by heme/onc, poor chemo candidate due to chronic medical issues and poor outpatient follow up  - general surgery was consulted for mastectomy vs lumpectomy  - palliative team has been consulted    Pancreatitis  - appreciate GI input;  - Ultra sound of abd revealed hepatomegaly  - CT of ABD revealed Minimal peripancreatic stranding and fluid consistent with mild pancreatitis  - Lipase down to 692 from >3000. Denies further nausea and abdominal pain. NPO for 24 hours (), tolerated clear liquid diet for breakfast, GI lite diet for lunch, and diabetic diet has been ordered for dinner. Pt has been demanding to eat regular food without any restriction. Reinforced about importance dietary compliance.     R diabetic foot wound with cellulitis, concern for underlying abscess/osteomyelitis:  - CT R foot: Soft tissue ulceration and subcutaneous edema compatible with the clinical  diagnosis of cellulitis. No evidence of osteomyelitis or drainable abscess  - difficulty with peripheral IV access. Pt declined to have midline. Established peripheral IV access by the PICC team, but pt refused us to use the site. ABX changed to PO  - wound cx: stap aureus and  Enterococcus which are sensitive to Augmentin which started on ) (originally Bactrim was started on  but d/c'd on ). - leukocytosis; will check CBC tomorrow  - appreciate podiatry input;  Topical triamcinalone 0.1% ointment TID PRN for rash. fup as outpatient  - appreciate wound care team input  - Pt has not been followed by any of the medical provider after her recent discharge on 7/13 due to transportation issue  - She stated \"I don't have car. I can't go to the clinic like you suggested. No, I am not going to nursing home. I will go home and do what I can. You give some antibiotic and I will get out of here soon\"      Acute on chronic hypercarbic respiratory failure multifactorial due to acute COPD exacerbation, obstructive sleep apnea on chronic Trilogy at home and chronic diastolic congestive heart failure compensated  - recent admission 7/8-13 for SVT, resp failure  - CTA chest this morning normal CT arteriogram of the chest. No pulmonary embolus. - appreciate pulmonology input; BiPAP only night time  - pt is at her baseline; chronic O2 @ 6L VIA N/C  - pulmonary toilet; added incentive spirometry to the therapy      Insulin dependent DM2 controlled without complication  - con't home lantus, glyburide  - lispro sliding scale  - hgbA1C 8.2  - pt requested no diet restriction. Informed the pt about importance of following diabetic diet. \"      Hypertension, benign/essential:  - con't toprol, lasix  - prn nitrobid    Dyslipidemia: con't statin     Intertrigo  - apply nystatin powder after the skin care     Supermorbid obesity  - pt expressed/requested not to talk about life style changes. \"I do what I can. \"    Low back pain and pain around bx site  - oxy PRN, follow bowel regimen  - added Lidocaine patch to her back      Code Status: Full  DVT Prophylaxis: heparin      Baseline: lives at home but dependent on caregivers who come in to provide her       Recommended Disposition: Pt has been refusing PT/OT. Limited therapy offer secondary to lack of participation by the pt        Subjective:     Chief Complaint / Reason for Physician Visit  \"I want to eat food without any restrcition. \"  Discussed with RN events overnight. Review of Systems:  Symptom Y/N Comments  Symptom Y/N Comments   Fever/Chills n   Chest Pain n    Poor Appetite    Edema     Cough    Abdominal Pain     Sputum    Joint Pain     SOB/RAZO n   Pruritis/Rash     Nausea/vomit n   Tolerating PT/OT     Diarrhea    Tolerating Diet     Constipation    Other y Back pain     Could NOT obtain due to:      Objective:     VITALS:   Last 24hrs VS reviewed since prior progress note. Most recent are:  Patient Vitals for the past 24 hrs:   Temp Pulse Resp BP SpO2   08/22/18 1449 98 °F (36.7 °C) 72 18 113/67 95 %   08/22/18 1425 - - - - 95 %   08/22/18 1030 97.5 °F (36.4 °C) 66 18 122/51 96 %   08/22/18 0852 - - - 102/55 -   08/22/18 0752 98.9 °F (37.2 °C) 72 20 97/45 100 %   08/22/18 0330 98.9 °F (37.2 °C) 71 18 102/52 100 %   08/21/18 2315 98.4 °F (36.9 °C) 70 18 103/54 97 %   08/21/18 1801 98.4 °F (36.9 °C) 68 18 112/54 99 %       Intake/Output Summary (Last 24 hours) at 08/22/18 1527  Last data filed at 08/22/18 1339   Gross per 24 hour   Intake              660 ml   Output             3050 ml   Net            -2390 ml        PHYSICAL EXAM:  General: Morbidly obese. Alert, cooperative, no acute distress    EENT:  EOMI. Anicteric sclerae. MMM  Resp:  Clear in apex with decreased breath sounds at bases. no wheezing or rales. No accessory muscle use  CV:  Regular  rhythm,  No edema  GI:  Soft, Non distended, Non tender.  +Bowel sounds  Neurologic:  Alert and oriented X 3, normal speech,   Psych:   Fair insight. gets agitated easily  Skin:  Lower extremities: both posterior LE has an area with thick yellow with some foul smelling drainage in combination with hyperkeratosis/skin scaling. Erythematous in all skin folds area.   No jaundice     Reviewed most current lab test results and cultures  YES  Reviewed most current radiology test results   YES  Review and summation of old records today    NO  Reviewed patient's current orders and MAR    YES  PMH/SH reviewed - no change compared to H&P  ________________________________________________________________________  Care Plan discussed with:    Comments   Patient y    Family      RN y    Care Manager y    Consultant  y Dr. Timmy Whitman                    y [phone] team rounds were held today with , nursing, pharmacist and clinical coordinator. Patient's plan of care was discussed; medications were reviewed and discharge planning was addressed. ________________________________________________________________________  Total NON critical care TIME:  35  Minutes    Total CRITICAL CARE TIME Spent:   Minutes non procedure based      Comments   >50% of visit spent in counseling and coordination of care     ________________________________________________________________________  Zac , NP     Procedures: see electronic medical records for all procedures/Xrays and details which were not copied into this note but were reviewed prior to creation of Plan. LABS:  I reviewed today's most current labs and imaging studies.   Pertinent labs include:  Recent Labs      08/21/18 0222 08/20/18   0501   WBC  12.6*  11.7*   HGB  11.6  10.8*   HCT  37.9  36.5   PLT  195  184     Recent Labs      08/22/18   0215  08/21/18   0954  08/21/18 0221 08/20/18   0501   NA  136   --   136  136   K  4.0   --   3.7  4.8   CL  100   --   100  101   CO2  32   --   32  32   GLU  148*   --   189*  286*   BUN  15   --   19  16   CREA  0.70   --   0.78  0.83   CA  10.2*   --   9.8  9.8   MG   --    --    --   1.9   ALB   --   2.8*   --    --    TBILI   --   0.5   --    --    SGOT   --   20   --    --    ALT   --   19   --    --        Signed: )Astrid Almazan, NP

## 2018-08-22 NOTE — PROGRESS NOTES
0700: Received bedside report from NIVIA off going nurse. Assumed care of patient. 0900: Received verbal orders from Lauryn Durham, NP to advance diet to GI lite at lunch time. Patient tolerating full liquid diet well, no abdominal discomfort. 1400: Will advance diet to [facility] for dinner per Saloni's orders. No nausea or vomiting.     1900: Bedside shift change report given to NORMA GONZÁLES (oncoming nurse). Report included the following information SBAR, Kardex, Intake/Output, MAR, Recent Results and Cardiac Rhythm NSR.     SHIFT SUMMARY:            [address] NURSING NOTE   Admission Date 8/19/2018   Admission Diagnosis Respiratory failure ([address]   Consults IP CONSULT TO PODIATRY  IP CONSULT TO GASTROENTEROLOGY  IP CONSULT TO ONCOLOGY  IP CONSULT TO PALLIATIVE CARE - PROVIDER  IP CONSULT TO PULMONOLOGY  IP CONSULT TO GENERAL SURGERY      Cardiac Monitoring [x] Yes [] No      Purposeful Hourly Rounding [x] Yes    Yu Score Total Score: 2   Yu score 3 or > [] Bed Alarm [] Avasys [] 1:1 sitter [] Patient refused (Signed refusal form in chart)   Jeffrey Score Jeffrey Score: 19   Jeffrey score 14 or < [] PMT consult [] Wound Care consult    []  Specialty bed  [] Nutrition consult      Influenza Vaccine Received Flu Vaccine for Current Season (usually Sept-March): Not Flu Season           Oxygen needs? [] Room air Oxygen @  []1L    []2L    []3L   []4L    []5L   [x]6L via  NC   Chronic home O2 use?  [x] Yes [] No  Perform O2 challenge test and document in progress note using smartphrase (.Homeoxygen)      Last bowel movement Last Bowel Movement Date: 08/20/18      Urinary Catheter             LDAs               Peripheral IV 08/21/18 Left Arm (Active)   Site Assessment Clean, dry, & intact 8/22/2018  8:00 AM   Phlebitis Assessment 0 8/22/2018  8:00 AM   Infiltration Assessment 0 8/22/2018  8:00 AM   Dressing Status Clean, dry, & intact 8/22/2018  8:00 AM   Dressing Type Transparent 8/22/2018  8:00 AM   Hub Color/Line Status Pink;Flushed;Patent 8/22/2018  8:00 AM   Alcohol Cap Used No 8/21/2018  3:55 PM                         Readmission Risk Assessment Tool Score High Risk            27       Total Score        3 Has Seen PCP in Last 6 Months (Yes=3, No=0)    9 IP Visits Last 12 Months (1-3=4, 4=9, >4=11)    9 Pt. Coverage (Medicare=5 , Medicaid, or Self-Pay=4)    6 Charlson Comorbidity Score (Age + Comorbid Conditions)        Criteria that do not apply:    . Living with Significant Other. Assisted Living. LTAC. SNF.  or   Rehab    Patient Length of Stay (>5 days = 3)       Expected Length of Stay 3d 19h   Actual Length of Stay 3

## 2018-08-22 NOTE — CONSULTS
Hematology Oncology Consultation    Reason for consult: Newly dx Lorraine Bains Breast Cancer    Admitting Diagnosis: Respiratory failure ([address]    Impression:   *) Newly dx RUOQ Intraductal Carcinoma of the Breast:  --- Bx showed IDC nuclear grade 3 and DCIS, hormonal markers pending. I discussed that clinically staging her is a bit hard since her mammo didn't show discrete mass but more calcifications. Regardless, she appears to have local early stage disease given no axillary LAD noted. She has had CT of CAP this admission for other reasons and no metastatic dz noted. I discussed that normal therapy for this would entail surgical intervention with either mastectomy vs lumpectomy. I will have surgery give input, she doesn't appear to be the best surgical candidate but needs evaluation. Discussed I would like to check BRCA1/2. Will have to await tumor markers to help tailor her therapy and await surgery input. If she goes to surgery then would await final path to determine therapy. That being said she doesn't appear to be good chemo candidate due to chornic issues and poor outpatient compliance. If surgery not option would likely plan for xrt and hormonal if ER/SC+. I will send ca27-29 today. Palliative being consulted per primary team.  *) Pancreatitis: GI on board, denies Etoh. Advancing diet slowly. *) Chronic hypercapnic respiratory failure on home O2 6L  *) LE Cellulitis - Chronic LE edema  *) Morbid Obesity  *) COPD      Thank you for this kind consult, we will follow along with you. Discussion: Alon Vance is a  57y.o. year old seen in consultation at the request of Dr. Latricia Rahman for newly dx breast cancer. Ms Donte Miller is a 56 yo female with morbid obesity, chronic hypercarbic respiratory failure, copd, GEN on home O2 and trilogy machine at home who has been dealing with chronic leg wounds.   She was admitted after a recent prior admission from 7/8 - 7/13, this admission was on 8/19 when she came in with worsening SOB and more pain/erytehma of R foot. During admission she has been dx with pancreatitis and GI is on board. She is also getting treated for R foot cellulitis with po abx, podiatry and wound care are seeing her. Apparently, she noted a rt breast mass ~4 months ago, she does not get routine health care, her last mammo was in . Primary team discussed with me she has been non compliant with following up with providers as outpt. During her last admission she noted breast mass and mammogram b/l  On 18 was done that had no worrisome findings on Left breast but widespread malignant appearing right breast calcifications (no overt mass described.) Ultrasound was done that didn't show any concerning rt axillary LAD, there was a palpable fullness of 2.5 cm masslike region. In the 10:00 area of palpable fullness is a 1.4 cm mass like region. Bx of the 11:00 lesion showed IDC nuclear grade 3 and DCIS. I have been consulted for my input. She had a maternal GM and maternal G-GM with breast cancer she believes they were both diagnosed after age 63. She previously smoked but quit this January, denies Etoh. Imagin18 Breast Mammogram and us:  BILATERAL DIAGNOSTIC DIGITAL MAMMOGRAPHY with CAD shows right breast numerous  pleomorphic microcalcifications involving all quadrants. There are no suspicious  left breast calcifications. A discrete mass is not mammographically apparent. IMPRESSIORIGHT BREAST SONOGRAPHY shows multiple dilated ducts with internal  calcifications and soft tissue. In the 11:00 area of palpable fullness is a 2.5  cm masslike region. In the 10:00 area of palpable fullness is a 1.4 cm masslike  region. There is a palpable sebaceous cyst in 7:00 location measuring 2.0 x 1.9  x 0.8 cm. There are no significant right axillary nodes. 1. BI-RADS Assessment Category 5: Highly suggestive of malignancy. Widespread  malignant appearing right breast calcifications.    2. Ultrasound-guided core needle right breast biopsy is recommended and this has  been discussed with the patient, called to the office, and will be scheduled  here. US Rt Breast and Biopsy:  MPRESSION:   Successful right breast ultrasound-guided biopsy yielding cores submitted for histologic analysis. A clip was placed at the biopsy site. CT A/P 8/21/18   IMPRESSION:   1. Minimal peripancreatic stranding and fluid consistent with mild pancreatitis. Clinical and laboratory correlation is suggested. 2. Atherosclerotic abdominal aorta without aneurysm. 3. Diverticulosis. 8/19/18 CT Chest:  IMPRESSION: Normal CT arteriogram of the chest. No pulmonary embolus.      Labs:    Recent Results (from the past 24 hour(s))   GLUCOSE, POC    Collection Time: 08/21/18  6:04 PM   Result Value Ref Range    Glucose (POC) 165 (H) 65 - 100 mg/dL    Performed by Renetta Polk (PCT)    GLUCOSE, POC    Collection Time: 08/21/18  8:28 PM   Result Value Ref Range    Glucose (POC) 160 (H) 65 - 100 mg/dL    Performed by WISE s.r.lS    METABOLIC PANEL, BASIC    Collection Time: 08/22/18  2:15 AM   Result Value Ref Range    Sodium 136 136 - 145 mmol/L    Potassium 4.0 3.5 - 5.1 mmol/L    Chloride 100 97 - 108 mmol/L    CO2 32 21 - 32 mmol/L    Anion gap 4 (L) 5 - 15 mmol/L    Glucose 148 (H) 65 - 100 mg/dL    BUN 15 6 - 20 MG/DL    Creatinine 0.70 0.55 - 1.02 MG/DL    BUN/Creatinine ratio 21 (H) 12 - 20      GFR est AA >60 >60 ml/min/1.73m2    GFR est non-AA >60 >60 ml/min/1.73m2    Calcium 10.2 (H) 8.5 - 10.1 MG/DL   LIPASE    Collection Time: 08/22/18  2:15 AM   Result Value Ref Range    Lipase 692 (H) 73 - 393 U/L   LIPID PANEL    Collection Time: 08/22/18  2:15 AM   Result Value Ref Range    LIPID PROFILE          Cholesterol, total 152 <200 MG/DL    Triglyceride 217 (H) <150 MG/DL    HDL Cholesterol 31 MG/DL    LDL, calculated 77.6 0 - 100 MG/DL    VLDL, calculated 43.4 MG/DL    CHOL/HDL Ratio 4.9 0 - 5.0     GLUCOSE, POC Collection Time: 18  7:49 AM   Result Value Ref Range    Glucose (POC) 130 (H) 65 - 100 mg/dL    Performed by Sarah Lovelace  (PCT)    GLUCOSE, POC    Collection Time: 18 11:30 AM   Result Value Ref Range    Glucose (POC) 137 (H) 65 - 100 mg/dL    Performed by Sarah Lovelcae  (PCT)        History:  Past Medical History:   Diagnosis Date    Arthritis     Asthma     Breast lump     CAD (coronary artery disease)     Congestive heart failure (HCC)     [facility]ch[facility]     Diabetes [facility]     Hypertension     Morbid [facility] with BMI of 70 and over, [facility]     NSTEMI (non-ST elevated myocardial infarction) ([facility]     SVT (supraventricular tachycardia) ([facility]       Past Surgical History:   Procedure Laterality Date    CARDIAC SURG PROCEDURE UNLIST      Stents    HX  SECTION      HX ORTHOPAEDIC      HX OTHER SURGICAL      cyst removed from back      Prior to Admission medications    Medication Sig Start Date End Date Taking? Authorizing Provider   albuterol (VENTOLIN HFA) 90 mcg/actuation inhaler Take 2 Puffs by inhalation every six (6) hours as needed for Wheezing. Historical Provider   cyclobenzaprine (FLEXERIL) 5 mg tablet Take 5 mg by mouth two (2) times a day. flexeriol    Historical Provider   metoprolol succinate (TOPROL-XL) 25 mg XL tablet Take  by mouth two (2) times a day. Landon Raza MD   furosemide (LASIX) 40 mg tablet Take 40 mg by mouth two (2) times a day. Indications: Peripheral Edema due to Chronic Heart Failure    Landon Raza MD   albuterol (PROVENTIL VENTOLIN) 2.5 mg /3 mL (0.083 %) nebulizer solution 3 mL by Nebulization route every four (4) hours as needed for Wheezing. 2/3/18   Aide Rosas MD   aspirin 81 mg chewable tablet Take 81 mg by mouth daily. Historical Provider   insulin glargine (LANTUS) 100 unit/mL injection 15 Units by SubCUTAneous route daily.     Historical Provider   hydrOXYzine pamoate (VISTARIL) 50 mg capsule Take 50 mg by mouth every eight (8) hours as needed for Itching. Indications: Pruritus of Skin    Historical Provider   nystatin (MYCOSTATIN) topical cream Apply  to affected area two (2) times daily as needed for Skin Irritation or Itching. Historical Provider   fluticasone (FLONASE) 50 mcg/actuation nasal spray 2 Sprays by Both Nostrils route daily. 9/9/17   Nesha Gallardo MD   lovastatin (MEVACOR) 40 mg tablet Take 1 Tab by mouth nightly. 1/14/16   Aldair Later, NP   glyBURIDE (DIABETA) 5 mg tablet Take 5 mg by mouth Daily (before breakfast). Historical Provider   cetirizine (ZYRTEC) 10 mg tablet Take 10 mg by mouth daily as needed for Allergies. Historical Provider   ketoconazole (NIZORAL) 2 % topical cream Apply  to affected area daily. Historical Provider   ammonium lactate (LAC-HYDRIN) 12 % topical cream rub in to affected area well 11/21/14   Lakhwinder Amado MD   nitroglycerin (NITROSTAT) 0.4 mg SL tablet 1 Tab by SubLINGual route every five (5) minutes as needed for Chest Pain (call 911 if not relieved by 3).  9/12/13   Aldair Later, NP     No Known Allergies   Social History   Substance Use Topics    Smoking status: Current Every Day Smoker     Packs/day: 0.25     Types: Cigarettes    Smokeless tobacco: Never Used      Comment: Patient states \"I  aint smoking no more d*mn cigarettes after yesterday\" 01/26/2018    Alcohol use No      Family History   Problem Relation Age of Onset    Heart Disease Mother     Heart Disease Father     Heart Disease Sister     Breast Cancer Maternal Grandmother     Breast Cancer Paternal Grandmother         Current Medications:  Current Facility-Administered Medications   Medication Dose Route Frequency    lidocaine (LIDODERM) 5 % patch 2 Patch  2 Patch TransDERmal Q24H    polyethylene glycol (MIRALAX) packet 17 g  17 g Oral DAILY    docusate sodium (COLACE) capsule 100 mg  100 mg Oral BID    triamcinolone acetonide (KENALOG) 0.1 % ointment Topical TID    ammonium lactate (LAC-HYDRIN) 12 % lotion   Topical BID    predniSONE (DELTASONE) tablet 20 mg  20 mg Oral DAILY WITH BREAKFAST    amoxicillin-clavulanate (AUGMENTIN) 875-125 mg per tablet 1 Tab  1 Tab Oral BID WITH MEALS    nystatin (MYCOSTATIN) 100,000 unit/gram powder   Topical BID    oxyCODONE IR (ROXICODONE) tablet 5 mg  5 mg Oral Q4H PRN    albuterol (PROVENTIL VENTOLIN) nebulizer solution 2.5 mg  2.5 mg Nebulization Q2H PRN    aspirin chewable tablet 81 mg  81 mg Oral DAILY    cetirizine (ZYRTEC) tablet 10 mg  10 mg Oral DAILY PRN    glyBURIDE (DIABETA) tablet 5 mg  5 mg Oral ACB    hydrOXYzine pamoate (VISTARIL) capsule 50 mg  50 mg Oral Q8H PRN    insulin glargine (LANTUS) injection 15 Units  15 Units SubCUTAneous DAILY    pravastatin (PRAVACHOL) tablet 40 mg  40 mg Oral QHS    metoprolol succinate (TOPROL-XL) XL tablet 25 mg  25 mg Oral BID    sodium chloride (NS) flush 5-10 mL  5-10 mL IntraVENous Q8H    sodium chloride (NS) flush 5-10 mL  5-10 mL IntraVENous PRN    acetaminophen (TYLENOL) tablet 650 mg  650 mg Oral Q4H PRN    ondansetron (ZOFRAN) injection 4 mg  4 mg IntraVENous Q4H PRN    bisacodyl (DULCOLAX) tablet 5 mg  5 mg Oral DAILY PRN    heparin (porcine) injection 5,000 Units  5,000 Units SubCUTAneous Q8H    insulin lispro (HUMALOG) injection   SubCUTAneous AC&HS    glucose chewable tablet 16 g  4 Tab Oral PRN    dextrose (D50W) injection syrg 12.5-25 g  12.5-25 g IntraVENous PRN    glucagon (GLUCAGEN) injection 1 mg  1 mg IntraMUSCular PRN    cyclobenzaprine (FLEXERIL) tablet 5 mg  5 mg Oral BID    guaiFENesin ER (MUCINEX) tablet 600 mg  600 mg Oral BID    albuterol-ipratropium (DUO-NEB) 2.5 MG-0.5 MG/3 ML  3 mL Nebulization Q6H RT         Review of Systems: 12 point obtained and negative other than stated in HPI      Physical Exam:  Constitutional Alert, cooperative, oriented.  Morbidly obese female wearing nasal cannula   Head Normocephalic; no scars Eyes Conjunctivae and sclerae are clear and without icterus. Pupils are reactive and equal.   ENMT Sinuses are nontender. Tongue normal.   Neck Supple without masses or thyromegaly. No jugular venous distension. Hematologic/Lymphatic  No tender or palpable lymph nodes in the cervical, supraclavicular, axillary or inguinal areas, however, exam hindered by her habitus. Respiratory Decreased bs bases   Cardiovascular Regular rate and rhythm of heart without murmurs, gallops or rubs. Chest / Line Site Chest is symmetric with no chest wall deformities. Abdomen Obese, soft,no masses, ascites or hepatosplenomegaly. Good bowel sounds. No guarding or rebound tenderness. No pulsatile masses. Musculoskeletal No tenderness or swelling, normal range of motion without obvious weakness. Extremities Chronic venous stasis b/l LE with wound dressing on RLE   Skin Very dry flaky skin   Neurologic No sensory or motor deficits   Psychiatric      Breast Alert and oriented times three. Coherent speech. Verbalizes understanding of our discussions today. Fungal infection under b/l breasts. Rt Breast pendulous, large, bx site with steri strips in place and dry blood, palpable area at bx site ~2cm, no axillary LAD but exam very difficult.

## 2018-08-22 NOTE — PROGRESS NOTES
Spoke with Dr. Alvaro Wheeler (hospitalist) regarding pathology results from breast biopsy. Since patient is an inpatient, they will handle all coordination of care from this point and let the patient know her results. Dr. Sander Martinez notified.

## 2018-08-23 VITALS
RESPIRATION RATE: 16 BRPM | SYSTOLIC BLOOD PRESSURE: 119 MMHG | WEIGHT: 293 LBS | DIASTOLIC BLOOD PRESSURE: 83 MMHG | HEART RATE: 73 BPM | OXYGEN SATURATION: 97 % | BODY MASS INDEX: 47.09 KG/M2 | TEMPERATURE: 97.9 F | HEIGHT: 66 IN

## 2018-08-23 PROBLEM — C50.919 BREAST CANCER (HCC): Status: ACTIVE | Noted: 2018-08-23

## 2018-08-23 PROBLEM — K85.90 ACUTE PANCREATITIS: Status: ACTIVE | Noted: 2018-08-23

## 2018-08-23 PROBLEM — L30.4 INTERTRIGO: Status: ACTIVE | Noted: 2018-08-23

## 2018-08-23 PROBLEM — Z71.89 COUNSELING REGARDING GOALS OF CARE: Status: ACTIVE | Noted: 2018-08-23

## 2018-08-23 LAB
ANION GAP SERPL CALC-SCNC: 3 MMOL/L (ref 5–15)
BASOPHILS # BLD: 0 K/UL (ref 0–0.1)
BASOPHILS NFR BLD: 0 % (ref 0–1)
BUN SERPL-MCNC: 19 MG/DL (ref 6–20)
BUN/CREAT SERPL: 26 (ref 12–20)
CALCIUM SERPL-MCNC: 10.1 MG/DL (ref 8.5–10.1)
CHLORIDE SERPL-SCNC: 102 MMOL/L (ref 97–108)
CO2 SERPL-SCNC: 31 MMOL/L (ref 21–32)
CREAT SERPL-MCNC: 0.73 MG/DL (ref 0.55–1.02)
DIFFERENTIAL METHOD BLD: NORMAL
EOSINOPHIL # BLD: 0.1 K/UL (ref 0–0.4)
EOSINOPHIL NFR BLD: 1 % (ref 0–7)
ERYTHROCYTE [DISTWIDTH] IN BLOOD BY AUTOMATED COUNT: 12.8 % (ref 11.5–14.5)
GLUCOSE BLD STRIP.AUTO-MCNC: 144 MG/DL (ref 65–100)
GLUCOSE BLD STRIP.AUTO-MCNC: 160 MG/DL (ref 65–100)
GLUCOSE BLD STRIP.AUTO-MCNC: 160 MG/DL (ref 65–100)
GLUCOSE SERPL-MCNC: 114 MG/DL (ref 65–100)
HCT VFR BLD AUTO: 38.2 % (ref 35–47)
HGB BLD-MCNC: 11.7 G/DL (ref 11.5–16)
IMM GRANULOCYTES # BLD: 0 K/UL (ref 0–0.04)
IMM GRANULOCYTES NFR BLD AUTO: 0 % (ref 0–0.5)
LIPASE SERPL-CCNC: 246 U/L (ref 73–393)
LYMPHOCYTES # BLD: 1.6 K/UL (ref 0.8–3.5)
LYMPHOCYTES NFR BLD: 18 % (ref 12–49)
MCH RBC QN AUTO: 29.2 PG (ref 26–34)
MCHC RBC AUTO-ENTMCNC: 30.6 G/DL (ref 30–36.5)
MCV RBC AUTO: 95.3 FL (ref 80–99)
MONOCYTES # BLD: 0.8 K/UL (ref 0–1)
MONOCYTES NFR BLD: 8 % (ref 5–13)
NEUTS SEG # BLD: 6.5 K/UL (ref 1.8–8)
NEUTS SEG NFR BLD: 72 % (ref 32–75)
NRBC # BLD: 0 K/UL (ref 0–0.01)
NRBC BLD-RTO: 0 PER 100 WBC
PLATELET # BLD AUTO: 185 K/UL (ref 150–400)
PMV BLD AUTO: 10.7 FL (ref 8.9–12.9)
POTASSIUM SERPL-SCNC: 4.3 MMOL/L (ref 3.5–5.1)
RBC # BLD AUTO: 4.01 M/UL (ref 3.8–5.2)
SERVICE CMNT-IMP: ABNORMAL
SODIUM SERPL-SCNC: 136 MMOL/L (ref 136–145)
WBC # BLD AUTO: 9 K/UL (ref 3.6–11)

## 2018-08-23 PROCEDURE — 36415 COLL VENOUS BLD VENIPUNCTURE: CPT | Performed by: INTERNAL MEDICINE

## 2018-08-23 PROCEDURE — 74011636637 HC RX REV CODE- 636/637: Performed by: INTERNAL MEDICINE

## 2018-08-23 PROCEDURE — 74011250637 HC RX REV CODE- 250/637: Performed by: INTERNAL MEDICINE

## 2018-08-23 PROCEDURE — 83690 ASSAY OF LIPASE: CPT | Performed by: INTERNAL MEDICINE

## 2018-08-23 PROCEDURE — 74011250637 HC RX REV CODE- 250/637: Performed by: NURSE PRACTITIONER

## 2018-08-23 PROCEDURE — 76450000000

## 2018-08-23 PROCEDURE — 86300 IMMUNOASSAY TUMOR CA 15-3: CPT | Performed by: INTERNAL MEDICINE

## 2018-08-23 PROCEDURE — 94760 N-INVAS EAR/PLS OXIMETRY 1: CPT

## 2018-08-23 PROCEDURE — 82962 GLUCOSE BLOOD TEST: CPT

## 2018-08-23 PROCEDURE — 80048 BASIC METABOLIC PNL TOTAL CA: CPT | Performed by: INTERNAL MEDICINE

## 2018-08-23 PROCEDURE — 77010033678 HC OXYGEN DAILY

## 2018-08-23 PROCEDURE — 85025 COMPLETE CBC W/AUTO DIFF WBC: CPT | Performed by: NURSE PRACTITIONER

## 2018-08-23 PROCEDURE — 74011000250 HC RX REV CODE- 250: Performed by: NURSE PRACTITIONER

## 2018-08-23 PROCEDURE — 74011250636 HC RX REV CODE- 250/636: Performed by: INTERNAL MEDICINE

## 2018-08-23 RX ORDER — KETOCONAZOLE 20 MG/G
CREAM TOPICAL DAILY
Qty: 15 G | Refills: 0 | Status: SHIPPED | OUTPATIENT
Start: 2018-08-23 | End: 2019-01-03

## 2018-08-23 RX ORDER — NYSTATIN 100000 [USP'U]/G
POWDER TOPICAL 2 TIMES DAILY
Qty: 1 BOTTLE | Refills: 0 | Status: ON HOLD | OUTPATIENT
Start: 2018-08-23 | End: 2018-10-22

## 2018-08-23 RX ORDER — AMOXICILLIN AND CLAVULANATE POTASSIUM 875; 125 MG/1; MG/1
1 TABLET, FILM COATED ORAL 2 TIMES DAILY WITH MEALS
Qty: 6 TAB | Refills: 0 | Status: SHIPPED | OUTPATIENT
Start: 2018-08-23 | End: 2018-08-26

## 2018-08-23 RX ORDER — GUAIFENESIN 600 MG/1
600 TABLET, EXTENDED RELEASE ORAL 2 TIMES DAILY
Qty: 14 TAB | Refills: 0 | Status: SHIPPED | OUTPATIENT
Start: 2018-08-23 | End: 2019-01-03

## 2018-08-23 RX ORDER — OXYCODONE HYDROCHLORIDE 5 MG/1
5 TABLET ORAL
Qty: 15 TAB | Refills: 0 | Status: SHIPPED | OUTPATIENT
Start: 2018-08-23 | End: 2019-01-03

## 2018-08-23 RX ORDER — POLYETHYLENE GLYCOL 3350 17 G/17G
17 POWDER, FOR SOLUTION ORAL DAILY
Qty: 30 PACKET | Refills: 0 | Status: SHIPPED | OUTPATIENT
Start: 2018-08-24 | End: 2019-01-03

## 2018-08-23 RX ORDER — DOCUSATE SODIUM 100 MG/1
100 CAPSULE, LIQUID FILLED ORAL 2 TIMES DAILY
Qty: 60 CAP | Refills: 0 | Status: SHIPPED | OUTPATIENT
Start: 2018-08-23 | End: 2018-09-22

## 2018-08-23 RX ORDER — LIDOCAINE 50 MG/G
2 PATCH TOPICAL EVERY 24 HOURS
Qty: 10 EACH | Refills: 0 | Status: ON HOLD | OUTPATIENT
Start: 2018-08-23 | End: 2018-10-22

## 2018-08-23 RX ADMIN — HEPARIN SODIUM 5000 UNITS: 5000 INJECTION INTRAVENOUS; SUBCUTANEOUS at 15:00

## 2018-08-23 RX ADMIN — Medication: at 09:30

## 2018-08-23 RX ADMIN — OXYCODONE HYDROCHLORIDE 5 MG: 5 TABLET ORAL at 09:25

## 2018-08-23 RX ADMIN — Medication: at 18:00

## 2018-08-23 RX ADMIN — CYCLOBENZAPRINE HYDROCHLORIDE 5 MG: 10 TABLET, FILM COATED ORAL at 09:25

## 2018-08-23 RX ADMIN — NYSTATIN: 100000 POWDER TOPICAL at 18:00

## 2018-08-23 RX ADMIN — GUAIFENESIN 600 MG: 600 TABLET, EXTENDED RELEASE ORAL at 09:25

## 2018-08-23 RX ADMIN — HEPARIN SODIUM 5000 UNITS: 5000 INJECTION INTRAVENOUS; SUBCUTANEOUS at 07:11

## 2018-08-23 RX ADMIN — PREDNISONE 20 MG: 20 TABLET ORAL at 09:25

## 2018-08-23 RX ADMIN — TRIAMCINOLONE ACETONIDE: 1 OINTMENT TOPICAL at 09:30

## 2018-08-23 RX ADMIN — POLYETHYLENE GLYCOL 3350 17 G: 17 POWDER, FOR SOLUTION ORAL at 09:25

## 2018-08-23 RX ADMIN — CYCLOBENZAPRINE HYDROCHLORIDE 5 MG: 10 TABLET, FILM COATED ORAL at 17:41

## 2018-08-23 RX ADMIN — GLYBURIDE 5 MG: 5 TABLET ORAL at 09:25

## 2018-08-23 RX ADMIN — AMOXICILLIN AND CLAVULANATE POTASSIUM 1 TABLET: 875; 125 TABLET, FILM COATED ORAL at 17:42

## 2018-08-23 RX ADMIN — INSULIN GLARGINE 15 UNITS: 100 INJECTION, SOLUTION SUBCUTANEOUS at 09:25

## 2018-08-23 RX ADMIN — METOPROLOL SUCCINATE 25 MG: 50 TABLET, EXTENDED RELEASE ORAL at 09:25

## 2018-08-23 RX ADMIN — METOPROLOL SUCCINATE 25 MG: 50 TABLET, EXTENDED RELEASE ORAL at 17:42

## 2018-08-23 RX ADMIN — INSULIN LISPRO 2 UNITS: 100 INJECTION, SOLUTION INTRAVENOUS; SUBCUTANEOUS at 17:41

## 2018-08-23 RX ADMIN — INSULIN LISPRO 2 UNITS: 100 INJECTION, SOLUTION INTRAVENOUS; SUBCUTANEOUS at 09:25

## 2018-08-23 RX ADMIN — NYSTATIN: 100000 POWDER TOPICAL at 09:30

## 2018-08-23 RX ADMIN — TRIAMCINOLONE ACETONIDE: 1 OINTMENT TOPICAL at 16:00

## 2018-08-23 RX ADMIN — AMOXICILLIN AND CLAVULANATE POTASSIUM 1 TABLET: 875; 125 TABLET, FILM COATED ORAL at 09:25

## 2018-08-23 RX ADMIN — GUAIFENESIN 600 MG: 600 TABLET, EXTENDED RELEASE ORAL at 17:41

## 2018-08-23 RX ADMIN — Medication 10 ML: at 14:00

## 2018-08-23 RX ADMIN — DOCUSATE SODIUM 100 MG: 100 CAPSULE, LIQUID FILLED ORAL at 09:25

## 2018-08-23 RX ADMIN — ASPIRIN 81 MG 81 MG: 81 TABLET ORAL at 09:25

## 2018-08-23 RX ADMIN — Medication 10 ML: at 06:00

## 2018-08-23 RX ADMIN — INSULIN LISPRO 2 UNITS: 100 INJECTION, SOLUTION INTRAVENOUS; SUBCUTANEOUS at 12:42

## 2018-08-23 NOTE — PROGRESS NOTES
0700: Received bedside report from [facility], off going nurse. Assumed care of patient. 1400: Contacted diabetes treatment team about free test stirps for patient. They do not stock those anymore. Recommended buying a glucose meter from [employer] (about $15, with 50 test strips for $9)    1900: Discharge instructions reviewed with patient, including follow up appointments, and medications. Opportunity for questions provided. Patient stable and ready for discharge at this time.

## 2018-08-23 NOTE — PROGRESS NOTES
F/U for acute pancreatitis without complications    S: Ms. Robb Francisco was seen by me today during rounds. At this time, she is resting + comfortably. The patient has no new complaints today. Please see admission consult for details of ROS; there are no changes today. No abdominal pain. O: Blood pressure 104/52, pulse 66, temperature 98.4 °F (36.9 °C), resp. rate 20, height 5' 6\" (1.676 m), weight (!) 183.7 kg (405 lb), SpO2 95 %. Gen: Patient is in no acute distress. There is no jaundice. Lungs: Clear to auscultation bilaterally . Heart:+RRR. Abd: Soft, non tender, non-distended, bowel sounds present. Extremities: Warm. Cross sectional imaging:  None new  No new labs    A: Active Problems:    Respiratory failure ([address] (8/19/2018)      Elevated lipase (8/21/2018)      Abdominal pain (8/21/2018)        Comment:  Labs not back today  P:  Doing well on diabetic diet  If numbers improve she can be discharged on low fat diet to follow up with pcp  I addressed my concerns about pravastatin and furosemide yesterday in my note  I will sign off tomorrow if she is still here.     Steph Talavera MD  6:02 AM  8/23/2018

## 2018-08-23 NOTE — PROGRESS NOTES
Supportive follow up visit on [facility] unit. No visitors present. Patient shared update about medical diagnosis. She expresses concerns about how she will handle suggested treatment with her other medical challenges--particularly her breathing if she has surgery. Also, she seems to feel alone in the world right now suffering some estrangement from siblings and other family members. Assured patient of ongoing presence of pastoral support as desired. Offered assurance of prayer and provided patient with a prayer blanket. Advised her of  availability.   David Puri, MPS, [employer], [address] [place] [address] [employer] Paging Service  [phone])

## 2018-08-23 NOTE — PROGRESS NOTES
-Hematology / Oncology ([facility]) -  -Primary Oncologist- ERIK Martines  --    -S-  Asking to go home today, still with pain at bx site    -O-    Patient Vitals for the past 24 hrs:   Temp Pulse Resp BP SpO2   08/23/18 1113 97.9 °F (36.6 °C) 65 18 109/66 97 %   08/23/18 0743 98 °F (36.7 °C) 72 22 111/70 97 %   08/23/18 0414 98.4 °F (36.9 °C) 66 20 104/52 95 %   08/22/18 2320 98.5 °F (36.9 °C) 72 20 137/84 93 %   08/22/18 1946 97.9 °F (36.6 °C) 66 24 117/60 97 %   08/22/18 1449 98 °F (36.7 °C) 72 18 113/67 95 %   08/22/18 1425 - - - - 95 %     08/23 0701 - 08/23 1900  In: 240 [P.O.:240]  Out: -      ROS: 12 point obtained and negative other than stated in HPI    Physical Exam:  Constitutional Alert, cooperative, oriented. Morbidly obese female wearing nasal cannula   Head Normocephalic; no scars   Eyes Conjunctivae and sclerae are clear and without icterus. Pupils are reactive and equal.   ENMT Sinuses are nontender. Tongue normal.   Neck Supple without masses or thyromegaly. No jugular venous distension. Hematologic/Lymphatic  No tender or palpable lymph nodes in the cervical, supraclavicular, axillary or inguinal areas, however, exam hindered by her habitus. Respiratory Decreased bs bases   Cardiovascular Regular rate and rhythm of heart without murmurs, gallops or rubs. Chest / Line Site Chest is symmetric with no chest wall deformities. Abdomen Obese, soft,no masses, ascites or hepatosplenomegaly. Good bowel sounds. No guarding or rebound tenderness. No pulsatile masses. Musculoskeletal No tenderness or swelling, normal range of motion without obvious weakness. Extremities Chronic venous stasis b/l LE with wound dressing on RLE   Skin Very dry flaky skin   Neurologic No sensory or motor deficits   Psychiatric         Alert and oriented times three. Coherent speech.  Verbalizes understanding of our discussions today.                            -Labs-    Recent Labs      08/23/18   3188  08/22/18   0215 08/21/18   0954  08/21/18   0222  08/21/18   0221   WBC  9.0   --    --   12.6*   --    HGB  11.7   --    --   11.6   --    PLT  185   --    --   195   --    ANEU  6.5   --    --   10.1*   --    NA  136  136   --    --   136   K  4.3  4.0   --    --   3.7   GLU  114*  148*   --    --   189*   BUN  19  15   --    --   19   CREA  0.73  0.70   --    --   0.78   ALT   --    --   19   --    --    SGOT   --    --   20   --    --    TBILI   --    --   0.5   --    --    AP   --    --   80   --    --    CA  10.1  10.2*   --    --   9.8       -Imaging-   8/13/18 Breast Mammogram and us:  BILATERAL DIAGNOSTIC DIGITAL MAMMOGRAPHY with CAD shows right breast numerous  pleomorphic microcalcifications involving all quadrants. There are no suspicious  left breast calcifications. A discrete mass is not mammographically apparent.          IMPRESSIORIGHT BREAST SONOGRAPHY shows multiple dilated ducts with internal  calcifications and soft tissue. In the 11:00 area of palpable fullness is a 2.5  cm masslike region. In the 10:00 area of palpable fullness is a 1.4 cm masslike  region. There is a palpable sebaceous cyst in 7:00 location measuring 2.0 x 1.9  x 0.8 cm. There are no significant right axillary nodes.     1. BI-RADS Assessment Category 5: Highly suggestive of malignancy. Widespread  malignant appearing right breast calcifications. 2. Ultrasound-guided core needle right breast biopsy is recommended and this has  been discussed with the patient, called to the office, and will be scheduled  here.      US Rt Breast and Biopsy:  MPRESSION:   Successful right breast ultrasound-guided biopsy yielding cores submitted for histologic analysis.  A clip was placed at the biopsy site.     CT A/P 8/21/18   IMPRESSION:   1. Minimal peripancreatic stranding and fluid consistent with mild pancreatitis. Clinical and laboratory correlation is suggested. 2. Atherosclerotic abdominal aorta without aneurysm.   3. Diverticulosis.     8/19/18 CT Chest:  IMPRESSION: Normal CT arteriogram of the chest. No pulmonary embolus.      -Assessment + Plan-     *) Newly dx RUOQ Intraductal Carcinoma of the Breast:  --- Bx showed IDC nuclear grade 3 and DCIS, ER/AK+, [id number]  --- Clinical staging difficult due to calcifications without discrete masses but no LAD on us is promosing for local dz.  ---- Appr Surgical input, planning rt mastectomy. Pt refused to stay inpt for procedure. Will f/u as outpt. --She has had CT of CAP this admission for other reasons and no metastatic dz noted. -- She has been on depo shot for years and isn't sure if she has went through menopause or not, shot given 1 month ago, asked her to stop shot so we can see what her hormonal status is. I can check FSH/LH levels as outpt off birth control. ---ca27-29 pending  --- D/C today per primary planned, office will call her with outpt f/u in ~4-6 weeks  *) Pancreatitis: GI on board, denies Etoh. Advancing diet slowly.   *) Chronic hypercapnic respiratory failure on home O2 6L  *) LE Cellulitis - Chronic LE edema  *) Morbid Obesity  *) COPD
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
## 2018-08-23 NOTE — PROGRESS NOTES
Reason for Admission:   Respiratory failure                RRAT Score:     28             Resources/supports as identified by patient/family:                   Top Challenges facing patient (as identified by patient/family and CM): Finances/Medication cost?    No issues reported at this time. Transportation? No issues reported at this time. Support system or lack thereof? No issues reported at this time. Living arrangements? Pt lives alone in a 1 story home. Self-care/ADLs/Cognition? Pt has personal care aids that assists her in the home           Current Advanced Directive/Advance Care Plan: On file Pt is full code                          Plan for utilizing home health:    Pt receives wound care through [facility]. CM discussed  recommendation with Pt. Pt declined stating that she has had bad experience with several [facility] agencies. Likelihood of readmission:  high                 Transition of Care Plan:    Pt is to discharge home via [facility] with follow-ups. Information provided on AVS.       Pt is a 57year old female admitted on 8/19/18 for respiratory failure. during initial assessment, Pt appeared oriented X4. Pt reports that she lives alone in a 1 story home with 5 steps. Pt reports to have several DME wheel chair, trilogy machine, O2 6L ([employer]), nebulizer. Pt anticipates [facility] to transport her home at discharge. CM [facility]. [facility] can accommodate Pt at 6:00pm today. There are no further CM needs at this time. Care Management Interventions  PCP Verified by CM:  Yes  Mode of Transport at Discharge: BLS  Transition of Care Consult (CM Consult): Discharge Planning  Discharge Durable Medical Equipment: No  Physical Therapy Consult: Yes  Occupational Therapy Consult: Yes  Current Support Network: Lives Alone  Confirm Follow Up Transport: Other (see comment)  Plan discussed with Pt/Family/Caregiver: Yes  Discharge Location  Discharge Placement: [address], [place], [address] [place] [address]  [phone]

## 2018-08-23 NOTE — CONSULTS
Palliative Medicine Consult  [place]: [phone] ([phone])    Patient Name: Rosamaria Kingston  YOB: 1970    Date of Initial Consult: 18  Reason for Consult: care decisions  Requesting Provider: Lex Jarvis NP  Primary Care Physician: Maegan Riojas NP     SUMMARY:   Rosamaria Kingston is a 57 y.o. with a past history of asthma, COPD, CHF, CAD, and diabetes, who was admitted on 2018 from home with a diagnosis of acute on chronic respiratory failure with hypoxia. Current medical issues leading to Palliative Medicine involvement include: newly diagnosed with breast cancer, pancreatitis, foot infection, morbidly obese, home bound. Psychosocial: w/c bound in rental home, lives alone, has caregivers through [place]. Mother  in March, pt has 1 sister and 2 brothers who are all estranged from pt. She had twins, 1  at 10 months and 1 son is alive, adult, Sima Valdez. mPOA is  [facility]. PALLIATIVE DIAGNOSES:   1. CP  (left sided)  2. SOB  3. Fatigue   4. Cough (dry)  5. Back pain  6. Headaches   7. Diabetic foot wound RT foot with cellulitis  8. Acute on chronic hypercarbic respiratory failure, COPD, GEN  9. CHF  10. Newly diagnosed NHIP intraductal carcinma of the breast  11. Non-compliance: refused to lie on back for PICC line, refused to work with OT/PT  12. Pancreatitis   13. epigastric pain   14. Care decisions   PLAN:   1. Met with pt, obtained history, discussed current medical problems, goals of care and code status. Pt does have [facility] that come to her home, and caregivers from [place]. She has no transportation, so getting to medical care that cannot be provided by [facility] is usually not possible. She is aware that she has breast cancer, reports that she frequently has abscesses under her right breast and groin.   We talked about proposed options of 1) surgery either lumpectomy or mastectomy and wait for final pathology to determine therapy, or, 2) radiation that will require transportation to and from everyday for 5-6 weeks. (just after my visit pt met with : plan is for right mastectomy in outpatient setting). We completed a new AMD as pt wanted to change the order of the mPOA. She wants full aggressive medical care, but does not want to be kept alive on machines if no hope of meaningful recovery. 2. Code status: FULL CODE  3. Initial consult note routed to primary continuity provider  4. Communicated plan of care with: Palliative IDT, RN, ,      GOALS OF CARE / TREATMENT PREFERENCES:     GOALS OF CARE:  Patient/Health Care Proxy Stated Goals: Prolong life      TREATMENT PREFERENCES:   Code Status: Full Code    Advance Care Planning:  Advance Care Planning 8/23/2018   Patient's Healthcare Decision Maker is: Named in scanned ACP document   Primary Decision Maker Name Cecile Whitman    Primary Decision Maker Phone Number [phone]   Primary Decision Maker Relationship to Patient Other relative   Secondary Decision Maker Name Dereck Watson Drakeford   Secondary Decision Maker Phone Number [phone]   Secondary Decision Maker Relationship to Patient Adult child   Confirm Advance Directive Yes, on file   Patient Would Like to Complete Advance Directive -       Medical Interventions: Full interventions   Other Instructions: Other:    As far as possible, the palliative care team has discussed with patient / health care proxy about goals of care / treatment preferences for patient. HISTORY:     History obtained from: chart, patient    CHIEF COMPLAINT: new onset chest pain    HPI/SUBJECTIVE:    The patient is:   [x] Verbal and participatory  [] Non-participatory due to:     8/19: BIBA to ED with new onset left-sided CP (5/10) started around 5pm  that lasts for seconds and is intermittent, and SOB with associated fatigue, dry cough, HA, and back pain. Pt is baseline on 6L 02 for COPD. Uses Trilogy at home. Pt also reports she noticed a mass on her right breast ~4 months ago, but does not get routine health care. Her maternal GM and maternal G-GM with breast cancer she believes they were both diagnosed after age 61. She previously smoked but quit this January, denies Etoh. ED W/U:  EXAM: morbidly obese, bilat LE edema, tachycardia, tachypnea, venous stasis BLE  EKG: NSR  LAB:  pC02 59, p02 79, bicarb 34, CBC wnl. IMAGING:  CHEST CT: normal CT arteriogram of chest, no PE.  CXR: neg. CT RLE: cellulitis    HOSPITAL COURSE: 8/19: admitted to [address] [place] [address].  8/20: biopsy of breast lesion. 8/21: seen by podiatry, dx with pustular psoriasis. New diagnosis today of pancreatitis, lipase >3000.  8/22: Bx showed IDC nuclear grade 3 and DCIS, hormonal markers pending.   8/23: pt reports pain in right breast at biopsy site, and bottom, both tolerable with current pain medication regimen. Appetite is good, no problems with N/V/D or constipation. Remainder of systems are negative. Pt pays someone to grocery shop for her.      Clinical Pain Assessment (nonverbal scale for severity on nonverbal patients):   Clinical Pain Assessment  Severity: 4  Location: right breast at biopsy site, buttocks  Character: just sore  Duration: 2 days  Effect: none  Factors: none  Frequency: constant     Activity (Movement): Lying quietly, normal position    Duration: for how long has pt been experiencing pain (e.g., 2 days, 1 month, years)  Frequency: how often pain is an issue (e.g., several times per day, once every few days, constant)     FUNCTIONAL ASSESSMENT:     Palliative Performance Scale (PPS):  PPS: 30       PSYCHOSOCIAL/SPIRITUAL SCREENING:     Palliative IDT has assessed this patient for cultural preferences / practices and a referral made as appropriate to needs (Cultural Services, Patient Advocacy, Ethics, etc.)    Advance Care Planning:  Advance Care Planning 8/23/2018   Patient's Healthcare Decision Maker is: Named in scanned ACP document   Primary Decision Maker Name Cecile Cervantes    Primary Decision Maker Phone Number [phone]   Primary Decision Maker Relationship to Patient Other relative   Secondary Decision Maker Name Umberto Geronimo   Secondary Decision Maker Phone Number [phone]   Secondary Decision Maker Relationship to Patient Adult child   Confirm Advance Directive Yes, on file   Patient Would Like to Complete Advance Directive -       Any spiritual / Adventist concerns:  [] Yes /  [x] No    Caregiver Burnout:  [] Yes /  [] No /  [x] No Caregiver Present      Anticipatory grief assessment:   [x] Normal  / [] Maladaptive       ESAS Anxiety: Anxiety: 0    ESAS Depression: Depression: 3        REVIEW OF SYSTEMS:     Positive and pertinent negative findings in ROS are noted above in HPI. The following systems were [x] reviewed / [] unable to be reviewed as noted in HPI  Other findings are noted below. Systems: constitutional, ears/nose/mouth/throat, respiratory, gastrointestinal, genitourinary, musculoskeletal, integumentary, neurologic, psychiatric, endocrine. Positive findings noted below. Modified ESAS Completed by: provider   Fatigue: 5 Drowsiness: 0   Depression: 3 Pain: 4   Anxiety: 0 Nausea: 0   Anorexia: 0 Dyspnea: 0     Constipation: No     Stool Occurrence(s): 1        PHYSICAL EXAM:     From RN flowsheet:  Wt Readings from Last 3 Encounters:   08/23/18 (!) 403 lb 3 oz (182.9 kg)   07/12/18 (!) 423 lb 1 oz (191.9 kg)   04/25/18 (!) 396 lb (179.6 kg)     Blood pressure 109/66, pulse 65, temperature 97.9 °F (36.6 °C), resp. rate 18, height 5' 6\" (1.676 m), weight (!) 403 lb 3 oz (182.9 kg), SpO2 97 %.     Pain Scale 1: Numeric (0 - 10)  Pain Intensity 1: 0  Pain Onset 1: chronic  Pain Location 1: Back  Pain Orientation 1: Mid  Pain Description 1: Aching  Pain Intervention(s) 1: Medication (see MAR)  Last bowel movement, if known:     Constitutional:  morbidly obese, pleasant and cooperative, NAD, AAOx3  Eyes: pupils equal, anicteric  ENMT: no nasal discharge, moist mucous membranes  Cardiovascular: regular rhythm, distal pulses intact  Respiratory: breathing not labored, symmetric, diminished but probably hindered by obesity  Gastrointestinal: soft non-tender, +bowel sounds, epigastric tenderness  Musculoskeletal: no deformity, no tenderness to palpation  Skin: warm, dry, chronic venous stasis BLE, dry flaky skin  Neurologic: following commands, moving all extremities  Psychiatric: full affect, no hallucinations, good insight      HISTORY:     Active Problems:    Respiratory failure (HCC) (2018)      Elevated lipase (2018)      Abdominal pain (2018)      Past Medical History:   Diagnosis Date    Arthritis     Asthma     Breast lump     CAD (coronary artery disease)     Congestive heart failure (HCC)     COPD [facility]chronic obstructive pulmonary disease) [facility]     Diabetes ([facility]     Hypertension     Morbid obesity with BMI of 70 and over, adult ([facility]     NSTEMI (non-ST elevated myocardial infarction) ([facility] [address]     SVT (supraventricular tachycardia) ([facility]       Past Surgical History:   Procedure Laterality Date    CARDIAC SURG PROCEDURE UNLIST      Stents    HX  SECTION      HX ORTHOPAEDIC      HX OTHER SURGICAL      cyst removed from back      Family History   Problem Relation Age of Onset    Heart Disease Mother     Heart Disease Father     Heart Disease Sister     Breast Cancer Maternal Grandmother     Breast Cancer Paternal Grandmother       History reviewed, no pertinent family history.   Social History   Substance Use Topics    Smoking status: Current Every Day Smoker     Packs/day: 0.25     Types: Cigarettes    Smokeless tobacco: Never Used      Comment: Patient states \"I  aint smoking no more d*mn cigarettes after yesterday\" 2018    Alcohol use No     No Known Allergies   Current Facility-Administered Medications   Medication Dose Route Frequency    lidocaine (LIDODERM) 5 % patch 2 Patch  2 Patch TransDERmal Q24H    polyethylene glycol (MIRALAX) packet 17 g  17 g Oral DAILY    docusate sodium (COLACE) capsule 100 mg  100 mg Oral BID    albuterol-ipratropium (DUO-NEB) 2.5 MG-0.5 MG/3 ML  3 mL Nebulization Q6H PRN    triamcinolone acetonide (KENALOG) 0.1 % ointment   Topical TID    ammonium lactate (LAC-HYDRIN) 12 % lotion   Topical BID    predniSONE (DELTASONE) tablet 20 mg  20 mg Oral DAILY WITH BREAKFAST    amoxicillin-clavulanate (AUGMENTIN) 875-125 mg per tablet 1 Tab  1 Tab Oral BID WITH MEALS    nystatin (MYCOSTATIN) 100,000 unit/gram powder   Topical BID    oxyCODONE IR (ROXICODONE) tablet 5 mg  5 mg Oral Q4H PRN    albuterol (PROVENTIL VENTOLIN) nebulizer solution 2.5 mg  2.5 mg Nebulization Q2H PRN    aspirin chewable tablet 81 mg  81 mg Oral DAILY    cetirizine (ZYRTEC) tablet 10 mg  10 mg Oral DAILY PRN    glyBURIDE (DIABETA) tablet 5 mg  5 mg Oral ACB    hydrOXYzine pamoate (VISTARIL) capsule 50 mg  50 mg Oral Q8H PRN    insulin glargine (LANTUS) injection 15 Units  15 Units SubCUTAneous DAILY    pravastatin (PRAVACHOL) tablet 40 mg  40 mg Oral QHS    metoprolol succinate (TOPROL-XL) XL tablet 25 mg  25 mg Oral BID    sodium chloride (NS) flush 5-10 mL  5-10 mL IntraVENous Q8H    sodium chloride (NS) flush 5-10 mL  5-10 mL IntraVENous PRN    acetaminophen (TYLENOL) tablet 650 mg  650 mg Oral Q4H PRN    ondansetron (ZOFRAN) injection 4 mg  4 mg IntraVENous Q4H PRN    bisacodyl (DULCOLAX) tablet 5 mg  5 mg Oral DAILY PRN    heparin (porcine) injection 5,000 Units  5,000 Units SubCUTAneous Q8H    insulin lispro (HUMALOG) injection   SubCUTAneous AC&HS    glucose chewable tablet 16 g  4 Tab Oral PRN    dextrose (D50W) injection syrg 12.5-25 g  12.5-25 g IntraVENous PRN    glucagon (GLUCAGEN) injection 1 mg  1 mg IntraMUSCular PRN    cyclobenzaprine (FLEXERIL) tablet 5 mg  5 mg Oral BID    guaiFENesin ER (MUCINEX) tablet 600 mg  600 mg Oral BID          LAB AND IMAGING FINDINGS:     Lab Results   Component Value Date/Time    WBC 9.0 08/23/2018 05:35 AM    HGB 11.7 08/23/2018 05:35 AM    PLATELET 826 60/25/9950 05:35 AM     Lab Results   Component Value Date/Time    Sodium 136 08/23/2018 05:35 AM    Potassium 4.3 08/23/2018 05:35 AM    Chloride 102 08/23/2018 05:35 AM    CO2 31 08/23/2018 05:35 AM    BUN 19 08/23/2018 05:35 AM    Creatinine 0.73 08/23/2018 05:35 AM    Calcium 10.1 08/23/2018 05:35 AM    Magnesium 1.9 08/20/2018 05:01 AM      Lab Results   Component Value Date/Time    AST (SGOT) 20 08/21/2018 09:54 AM    Alk. phosphatase 80 08/21/2018 09:54 AM    Protein, total 8.0 08/21/2018 09:54 AM    Albumin 2.8 (L) 08/21/2018 09:54 AM    Globulin 5.2 (H) 08/21/2018 09:54 AM     Lab Results   Component Value Date/Time    INR 1.0 07/09/2018 03:15 AM    Prothrombin time 10.2 07/09/2018 03:15 AM    aPTT 22.7 07/09/2018 03:15 AM      No results found for: IRON, FE, TIBC, IBCT, PSAT, FERR   Lab Results   Component Value Date/Time    pH 7.37 08/19/2018 02:11 AM    PCO2 59 (H) 08/19/2018 02:11 AM    PO2 79 (L) 08/19/2018 02:11 AM     No components found for: [place]   Lab Results   Component Value Date/Time    CK 42 07/09/2018 12:06 AM    CK - MB <1.0 07/09/2018 12:06 AM                Total time: 85  Counseling / coordination time, spent as noted above: 75  > 50% counseling / coordination?: n    Prolonged service was provided for  []30 min   []75 min in face to face time in the presence of the patient, spent as noted above. Time Start:   Time End:   Note: this can only be billed with 00622 (initial) or 06364 (follow up). If multiple start / stop times, list each separately.

## 2018-08-23 NOTE — CONSULTS
Surgery Consult    Subjective:      Maryam Olmstead is a 57 y.o. female who I am asked to consult on by Dr Bridgette Han for a new right breast cancer. She was admitted with pancreatitis and hypoxia and was found to have a right breast mass on admission exam.  She states the mass has been present for several months. She had a mammogram and US demonstrating microcalcifications throughout the right breast and two masses. US core bx demonstrated IDC G3 and DCIS  % her2/rupert unamplified. She denies prior breast problems, other masses, nipple changes or drainage. She reports MGM with breast cancer but no other with ovarian cancer.     Past Medical History:   Diagnosis Date    Arthritis     Asthma     Breast lump     CAD (coronary artery disease)     Congestive heart failure (HCC)     COPD (chronic obstructive pulmonary disease) (HCC)     [facility]     Hypertension     Morbid obesity with BMI of 70 and over, adult ([facility]     NSTEMI (non-ST elevated myocardial infarction) ([facility]     SVT (supraventricular tachycardia) ([facility]      Past Surgical History:   Procedure Laterality Date    CARDIAC SURG PROCEDURE UNLIST      Stents    HX  SECTION      HX ORTHOPAEDIC      HX OTHER SURGICAL      cyst removed from back      Family History   Problem Relation Age of Onset    Heart Disease Mother     Heart Disease Father     Heart Disease Sister     Breast Cancer Maternal Grandmother     Breast Cancer Paternal Grandmother      Social History     Social History    Marital status: SINGLE     Spouse name: N/A    Number of children: N/A    Years of education: N/A     Social History Main Topics    Smoking status: Current Every Day Smoker     Packs/day: 0.25     Types: Cigarettes    Smokeless tobacco: Never Used      Comment: Patient states \"I  aint smoking no more d*mn cigarettes after yesterday\" 2018    Alcohol use No    Drug use: No    Sexual activity: Not Asked     Other Topics Concern    None Social History Narrative    ** Merged History Encounter **           Current Facility-Administered Medications   Medication Dose Route Frequency Provider Last Rate Last Dose    lidocaine (LIDODERM) 5 % patch 2 Patch  2 Patch TransDERmal Q24H Astrid Almazan NP   2 Patch at 08/22/18 1145    polyethylene glycol (MIRALAX) packet 17 g  17 g Oral DAILY Astrid Almazan NP   17 g at 08/23/18 0925    docusate sodium (COLACE) capsule 100 mg  100 mg Oral BID Astrid Almazan NP   100 mg at 08/23/18 0925    albuterol-ipratropium (DUO-NEB) 2.5 MG-0.5 MG/3 ML  3 mL Nebulization Q6H PRN Sapna Lovett MD        triamcinolone acetonide (KENALOG) 0.1 % ointment   Topical TID Tracey Lopez DPM        ammonium lactate (LAC-HYDRIN) 12 % lotion   Topical BID Viola Andino MD        predniSONE (DELTASONE) tablet 20 mg  20 mg Oral DAILY WITH BREAKFAST Diane Pedro MD   20 mg at 08/23/18 0925    amoxicillin-clavulanate (AUGMENTIN) 875-125 mg per tablet 1 Tab  1 Tab Oral BID WITH MEALS Astrid Almazan NP   1 Tab at 08/23/18 0925    nystatin (MYCOSTATIN) 100,000 unit/gram powder   Topical BID Astrid Almazan NP        oxyCODONE IR (ROXICODONE) tablet 5 mg  5 mg Oral Q4H PRN Cheryl Lafleur MD   5 mg at 08/23/18 0925    albuterol (PROVENTIL VENTOLIN) nebulizer solution 2.5 mg  2.5 mg Nebulization Q2H PRN Sapna Lovett MD        aspirin chewable tablet 81 mg  81 mg Oral DAILY Sapna Lovett MD   81 mg at 08/23/18 6844    cetirizine (ZYRTEC) tablet 10 mg  10 mg Oral DAILY PRN Sapna Lovett MD        glyBURIDE (DIABETA) tablet 5 mg  5 mg Oral ACB Sapna Lovett MD   5 mg at 08/23/18 2515    hydrOXYzine pamoate (VISTARIL) capsule 50 mg  50 mg Oral Q8H PRN Sapna Lovett MD        insulin glargine (LANTUS) injection 15 Units  15 Units SubCUTAneous DAILY Sapna Lovett MD   15 Units at 08/23/18 9743    pravastatin (PRAVACHOL) tablet 40 mg  40 mg Oral QHS Sapna Lovett MD   40 mg at 08/22/18 9745    metoprolol succinate (TOPROL-XL) XL tablet 25 mg  25 mg Oral BID Nancy Stock MD   25 mg at 18 2505    sodium chloride (NS) flush 5-10 mL  5-10 mL IntraVENous Q8H Nancy Stock MD   10 mL at 18 0600    sodium chloride (NS) flush 5-10 mL  5-10 mL IntraVENous PRN Nancy Stock MD        acetaminophen (TYLENOL) tablet 650 mg  650 mg Oral Q4H PRN Nancy Stock MD        ondansetron [facility]) injection 4 mg  4 mg IntraVENous Q4H PRN Nancy Stock MD        bisacodyl (DULCOLAX) tablet 5 mg  5 mg Oral DAILY PRN Nancy Stock MD        heparin (porcine) injection 5,000 Units  5,000 Units SubCUTAneous Q8H Nancy Stock MD   5,000 Units at 18 3457    insulin lispro (HUMALOG) injection   SubCUTAneous AC&HS Nancy Stock MD   2 Units at 18 8261    glucose chewable tablet 16 g  4 Tab Oral PRN Nancy Stock MD        dextrose (D50W) injection syrg 12.5-25 g  12.5-25 g IntraVENous PRN Nancy Stock MD        glucagon [facility]) injection 1 mg  1 mg IntraMUSCular PRN Nancy Stock MD        cyclobenzaprine (FLEXERIL) tablet 5 mg  5 mg Oral BID Nancy Stock MD   5 mg at 18 3420    guaiFENesin ER (MUCINEX) tablet 600 mg  600 mg Oral BID Nancy Stock MD   600 mg at 18 0327        No Known Allergies    Review of Systems:  A comprehensive review of systems was negative except for: Constitutional: positive for sweats and malaise  Respiratory: positive for dyspnea on exertion or SOB, hypoxia requiring 6 L nc O2 at baseline, cannot lay flat due to orthopnea  Gastrointestinal: positive for nausea and abdominal pain  Integument/breast: positive for breast lump    Objective:      Patient Vitals for the past 8 hrs:   BP Temp Pulse Resp SpO2 Weight   18 1113 109/66 97.9 °F (36.6 °C) 65 18 97 % -   18 0743 111/70 98 °F (36.7 °C) 72 22 97 % -   18 0658 - - - - - (!) 182.9 kg (403 lb 3 oz)   18 0414 104/52 98.4 °F (36.9 °C) 66 20 95 % -       Temp (24hrs), Av.1 °F (36.7 °C), Min:97.9 °F (36.6 °C), Max:98.5 °F (36.9 °C)      Physical Exam:  GENERAL: alert, cooperative, no distress, appears older than stated age, morbidly obese, EYE: negative findings: anicteric sclera, LYMPHATIC: Cervical, supraclavicular, and axillary nodes normal. , THROAT & NECK: normal and no erythema or exudates noted. ,BREASTS:  Med large symmetrical, masses in upper right breast, dermal cyst at 0600 right breast, normal nipples without discharge bilaterally LUNG: clear to auscultation bilaterally, HEART: regular rate and rhythm, S1, S2 normal, no murmur, click, rub or gallop, ABDOMEN: soft, non-tender. Bowel sounds normal. No masses,  no organomegaly, EXTREMITIES:  Bilateral lower extremity edema and venous stasis, extremities, atraumatic, no cyanosis , SKIN: legs with chronic inflammation, NEUROLOGIC: negative findings: alert, oriented x3, PSYCHIATRIC: non focal    I reviewed her mammogram and US imageas    Assessment:     Hospital Problems  Date Reviewed: 1/30/2018          Codes Class Noted POA    Elevated lipase ICD-10-CM: R74.8  ICD-9-CM: 790.5  8/21/2018 Unknown        Abdominal pain ICD-10-CM: R10.9  ICD-9-CM: 789.00  8/21/2018 Unknown        Respiratory failure ([facility] ICD-10-CM: J96.90  ICD-9-CM: 518.81  8/19/2018 Unknown          1. Newly diagnosed right breast cancer at least Clinically stage 2 (T2N0M0). I spent over 60 minutes discussing the anatomy and pathophysiology of breast cancer and treatment options, prognosis. We discussed breast conservation therapy vs mastectomy with and without reconstruction, sentinel lymph node biopsy and axillary dissection, various forms of radiation (whole vs accelerated partial breast), medical oncology therapy (SERM vs Aromatase inhibitors, chemotherapy, herceptin). I discussed about BRCA genetic testing and oncotype DX gene mapping of the tumor.   I discussed the risks and benefits of surgery including bleeding, infection, paresthesias and numbness, cosmetic changes, lymphedema, seroma, chronic pain, and need for reoperation for positive margins/sentinel nodes. I discussed websites for her to review. 2.  supermorbid obesity. Body mass index is 65.08 kg/(m^2). 3.  Baseline hypoxia due to obesity. 6 L nc O2 at baseline. She will be at high risk of post op respiratory failure/ventilator  4. Pancreatitis ? ? etiology but improving      Plan: We had a lengthy discussion regarding options for therapy. Her IDC would be amenable to breast conservation but the extent of DCIS would make an adequate cosmetic result impossible. She is also a poor candidate for radiation given body habitus. The best approach is to undergo a right mastectomy with SLNB/possible axillary node dissection. She is a poor candidate for chemotherapy as well. I strongly urged her to stay in hospital and get \"tuned up\" and have the surgery 8/27 but she declined stating she had to take care of things at home first.  She will f/u with me in the office after discharge to arrange surgery.   Discussed with Dr Angelina Orlando and Katiana Carballo    Signed By: Kaitlin Valdivia MD     August 23, 2018

## 2018-08-23 NOTE — PALLIATIVE CARE DISCHARGE
Goals of Care/Treatment Preferences    The Palliative Medicine team was consulted as part of your/your loved one's care in the hospital. Our team is a supportive service; we strive to relieve suffering and improve quality of life. We reviewed advance care planning information, which includes the followin [address] is[de-identified] Named in scanned ACP document  Primary Decision Maker Name: Viviana Carlton   Primary Decision Maker Phone Number: [phone]  Primary Decision Maker Relationship to Patient: Other relative  Secondary Decision Maker Name: Josseline Geronimo  Secondary Decision Maker Phone Number: [phone]  Secondary Decision Maker Relationship to Patient: Adult child  Confirm Advance Directive: Yes, on file    Patient/Health Care Proxy Stated Goals: Prolong life    We reviewed / discussed your code status as: Full Code     Full Code means perform CPR in the event of cardiac arrest.      DNR means do NOT perform CPR in the event of cardiac arrest.      Partial Code means you have specific preferences, please discuss with your healthcare team.      Rodger Collado means this issue was not addressed / resolved during your stay    Medical Interventions: Full interventions  Other Instructions: The palliative inpatient team of Linda Rawls NP and Josselin Brush LCSW met with you while you were at [facility]. Our team is here to provide emotional support and to address your goals for your health care. You also met with the , Lisa Mckinney, to discuss your Advanced Medical Directives where you named those listed above as being your voice if you are unable to make health care decisions. Please know that you can reach us if you have questions at :[phone]. We wish you the best with your health care needs. Because of the importance of this information, we are providing you with a printed copy to share with other healthcare providers after this hospitalization is complete.

## 2018-08-23 NOTE — PROGRESS NOTES
Attempted to see pt for PT tx. Palliative just walked into the room. Per chart pt is to discharge today and nursing confirms. Will defer at this time.

## 2018-08-23 NOTE — DISCHARGE INSTRUCTIONS
Patient Discharge Instructions     Pt Name  Estelle Rocha   Date of Birth 1970   Age  57 y.o. Medical Record Number  [id number]   PCP Celeste Singh NP    Admit date:  8/19/2018 @    [place] [address]    Room Number  2290/01   Date of Discharge 8/23/2018     Admission Diagnoses:     Respiratory failure ([address]        No Known Allergies     You were admitted to [address] for  Respiratory failure ([address]    [place] (BUT NOT LIMITED TO ):  Present on Admission:   Respiratory failure ([address]   Elevated lipase   Abdominal pain   Counseling regarding goals of care   CHF (congestive heart failure) (HCC)   Malignant essential hypertension   COPD (chronic obstructive pulmonary disease) (HCC)   Obesity, morbid (more than 100 lbs over ideal weight or BMI > 40) (HCC)   Obesity hypoventilation syndrome (HCC)   Hypoxia   Cellulitis   Type II diabetes mellitus, uncontrolled ([address]   Acute pancreatitis   Breast cancer ([address]   Intertrigo      DIET:  Diabetic Diet     Recommended activity: Activity as tolerated  Follow up :    Follow-up Information     Follow up With Details Comments Jessie Guerra NP Go on 8/24/2018 Hospital follow-up scheduled and office will call to schedule a time of visit ( If you have questions or need to reschedule please call [address] León Grove MD Go on 9/11/2018  [address] [place] [address]      Alex Nicholas MD Schedule an appointment as soon as possible for a visit 3 weeks; after seen by Dr. Dania Davis [phone]      Jaki Georges DPM Schedule an appointment as soon as possible for a visit 2 weeks [address] [place] [address].[address] [address] [phone]          Bilateral leg care:  Cleanse the skin with soap and rinse well with water and dry. Immediately apply the Lac-Hydrin lotion and allow it to absorb into the skin. · It is important that you take the medication exactly as they are prescribed. · Keep your medication in the bottles provided by the pharmacist and keep a list of the medication names, dosages, and times to be taken in your wallet. · Do not take other medications without consulting your doctor. ADDITIONAL INFORMATION: If you experience any of the following symptoms or have any health problem not listed below, then please call your primary care physician or return to the emergency room if you cannot get hold of your doctor: Fever, chills, nausea, vomiting, diarrhea, change in mentation, falling, bleeding, shortness of breath. I understand that if any problems occur once I am discharged, I am supposed to call my Primary care physician for further care or seek help in the Emergency Department at the nearest Healthcare facility. I have had an opportunity to discuss my clinical issues with my doctor and nursing staff. I understand and acknowledge receipt of the above instructions.                                                                                                                                            Physician's or R.N.'s Signature                                                            Date/Time                                                                                                                                              Patient or Representative Signature                                                 Date/Time

## 2018-08-23 NOTE — PROGRESS NOTES
First Initial PCP LETICIA appt scheduled with CALOS Sanders on 8/24/2018 at [facility] by provider time.  Appt added to AVS. Guillermina Peres CM Specialist

## 2018-08-23 NOTE — DISCHARGE SUMMARY
Hospitalist Discharge Summary     Patient ID:  Humaira Rivera  [id number]  22 y.o.  1970    PCP on record: Yoli Elmore NP    Admit date: 8/19/2018  Discharge date and time: 8/23/2018      DISCHARGE DIAGNOSIS:  Newly dx RUOQ Intraductal Carcinoma of the Breast  Pancreatitis  R diabetic foot wound with cellulitis    Acute on chronic hypercarbic respiratory failure multifactorial due to acute COPD exacerbation, obstructive sleep apnea on chronic Trilogy at home and chronic diastolic congestive heart failure compensated  Insulin dependent DM2 controlled without complication  Hypertension, benign/essential:  Dyslipidemia  Intertrigo  Supermorbid obesity  Low back pain and pain around bx site    CONSULTATIONS:  IP CONSULT TO PODIATRY  IP CONSULT TO GASTROENTEROLOGY  IP CONSULT TO ONCOLOGY  IP CONSULT TO PALLIATIVE CARE - PROVIDER  IP CONSULT TO PULMONOLOGY  IP CONSULT TO GENERAL SURGERY    Excerpted HPI from H&P of Suzanne Canavan, MD:    Kayden Cruz is a 57 y.o.  female who presents with above. Pt recently admitted to [facility] 7/8-13. She says that she has continued to do poorly since discharge. She complains of ongoing shortness of breath essentially all the time. She does not have much of a cough and it is not productive. She denies CP. She denies fever or URI sx. She has been compliant with her trilogy machine at home she says but did not bring it to the hospital.  She denies changes in po intake. Denies stool changes. She has chronic LE wounds but says that recently her R foot has become severely painful when she bears weight. It has also developed new erythema as well.  ______________________________________________________________________  DISCHARGE SUMMARY/HOSPITAL COURSE:  for full details see H&P, daily progress notes, labs, consult notes.      Newly dx RUOQ Intraductal Carcinoma of the Breast  - BX revealed Invasive ductal carcinoma, nuclear grade 3, involving several cores at least 0.6 cm Ductal carcinoma in situ, nuclear grade 2, cribriform and micropapillary pattern with comedonecrosis and calcifications. hormone receptors and HER-2 by IHC is pending. CA27-29 result pending  - appreciate heme/onc input; tumor marker will be checked by heme/onc, poor chemo candidate due to chronic medical issues and poor outpatient follow up  - appreciate general surgery, Dr. Isabel Benavidez. Pt will follow up with Dr. Hailey Prince to set up mastectomy  - appreciate palliative team input; pt wants full aggressive medical care, but does not want to be kept alive on machines if no hope of meaningful recovery.     Pancreatitis  - Ultra sound of abd revealed hepatomegaly  - CT of ABD revealed Minimal peripancreatic stranding and fluid consistent with mild pancreatitis  - Lipase down to 246 from >3000. Denies further nausea and abdominal pain. Pt is able to tolerate diabetic diet without difficulty      R diabetic foot wound with cellulitis  - CT R foot: Soft tissue ulceration and subcutaneous edema compatible with the clinical  diagnosis of cellulitis. No evidence of osteomyelitis or drainable abscess  - difficulty with peripheral IV access. Pt declined to have midline. Established peripheral IV access by the PICC team, but pt refused us to use the site. ABX changed to PO  - wound cx: stap aureus and  Enterococcus which are sensitive to Augmentin which started on 8/20) (originally Bactrim was started on 8/20 but d/c'd on 8/21). Pt will complete total 7 days of ABX  - leukocytosis; resolved  - appreciate podiatry input; Topical triamcinalone 0.1% ointment TID PRN for rash. fup as outpatient  - appreciate wound care team input  - Pt has not been followed by any of the medical provider after her recent discharge on 7/13 due to transportation issue  - Bilateral leg care:  Cleanse the skin with soap and rinse well with water and dry.  Immediately apply the Lac-Hydrin lotion and allow it to absorb into the skin.      Acute on chronic hypercarbic respiratory failure multifactorial due to acute COPD exacerbation, obstructive sleep apnea on chronic Trilogy at home and chronic diastolic congestive heart failure compensated  - recent admission 7/8-13 for SVT, resp failure  - CTA chest this morning normal CT arteriogram of the chest. No pulmonary embolus. - appreciate pulmonology input; BiPAP only night time  - pt is at her baseline; chronic O2 @ 6L VIA N/C  - pulmonary toilet; added incentive spirometry to the therapy      Insulin dependent DM2 controlled without complication  - con't home lantus, glyburide. Advised pt to check blood glucose before breakfast and at bedtime, bring the information to her pcp on her follow up visit  - discussed about importance of following diabetic diet. Pt was more receptive and participated in conversation      Hypertension, benign/essential:  - con't toprol, lasix    Dyslipidemia: con't statin      Intertrigo  - apply nystatin powder after the skin care      Supermorbid obesity  - pt expressed/requested not to talk about life style changes. \"I do what I can. \"     Low back pain and pain around bx site  - oxy PRN, follow bowel regimen  - Lidocaine patch to her back      _______________________________________________________________________  Patient seen and examined by me on discharge day. Pertinent Findings:  Gen:    Not in distress  Chest: Clear in apex with decreased breath sounds at bases  CVS:   Regular rhythm. diffuse edema in lower extremities  Abd:  Soft, not distended, not tender  Neuro:  Alert, orient x 4  _______________________________________________________________________  DISCHARGE MEDICATIONS:   Current Discharge Medication List      START taking these medications    Details   nystatin (MYCOSTATIN) powder Apply  to affected area two (2) times a day.   Qty: 1 Bottle, Refills: 0      amoxicillin-clavulanate (AUGMENTIN) 875-125 mg per tablet Take 1 Tab by mouth two (2) times daily (with meals) for 3 days. Qty: 6 Tab, Refills: 0      docusate sodium (COLACE) 100 mg capsule Take 1 Cap by mouth two (2) times a day for 30 days. Qty: 60 Cap, Refills: 0      guaiFENesin ER (MUCINEX) 600 mg ER tablet Take 1 Tab by mouth two (2) times a day. Qty: 14 Tab, Refills: 0      lidocaine (LIDODERM) 5 % 2 Patches by TransDERmal route every twenty-four (24) hours. Apply patch to the affected area for 12 hours a day and remove for 12 hours a day. Qty: 10 Each, Refills: 0    Associated Diagnoses: Bilateral low back pain with sciatica, sciatica laterality unspecified, unspecified chronicity      oxyCODONE IR (ROXICODONE) 5 mg immediate release tablet Take 1 Tab by mouth every four (4) hours as needed (pain 6-10). Max Daily Amount: 30 mg.  Qty: 15 Tab, Refills: 0    Associated Diagnoses: Malignant neoplasm of nipple of right breast in female, unspecified estrogen receptor status (HCC)      polyethylene glycol (MIRALAX) 17 gram packet Take 1 Packet by mouth daily. Qty: 30 Packet, Refills: 0      glucose blood VI test strips (EASYGLUCO TEST) strip by Does Not Apply route ACB/HS. Qty: 100 Strip, Refills: 0         CONTINUE these medications which have CHANGED    Details   ketoconazole (NIZORAL) 2 % topical cream Apply  to affected area daily. Qty: 15 g, Refills: 0         CONTINUE these medications which have NOT CHANGED    Details   albuterol (VENTOLIN HFA) 90 mcg/actuation inhaler Take 2 Puffs by inhalation every six (6) hours as needed for Wheezing. cyclobenzaprine (FLEXERIL) 5 mg tablet Take 5 mg by mouth two (2) times a day. flexeriol      metoprolol succinate (TOPROL-XL) 25 mg XL tablet Take  by mouth two (2) times a day. furosemide (LASIX) 40 mg tablet Take 40 mg by mouth two (2) times a day.  Indications: Peripheral Edema due to Chronic Heart Failure      albuterol (PROVENTIL VENTOLIN) 2.5 mg /3 mL (0.083 %) nebulizer solution 3 mL by Nebulization route every four (4) hours as needed for Wheezing. Qty: 24 Each, Refills: 0      aspirin 81 mg chewable tablet Take 81 mg by mouth daily. insulin glargine (LANTUS) 100 unit/mL injection 15 Units by SubCUTAneous route daily. hydrOXYzine pamoate (VISTARIL) 50 mg capsule Take 50 mg by mouth every eight (8) hours as needed for Itching. Indications: Pruritus of Skin      fluticasone (FLONASE) 50 mcg/actuation nasal spray 2 Sprays by Both Nostrils route daily. Qty: 1 Bottle, Refills: 0      lovastatin (MEVACOR) 40 mg tablet Take 1 Tab by mouth nightly. Qty: 30 Tab, Refills: 6    Associated Diagnoses: Atherosclerosis of native coronary artery without angina pectoris      glyBURIDE (DIABETA) 5 mg tablet Take 5 mg by mouth Daily (before breakfast). cetirizine (ZYRTEC) 10 mg tablet Take 10 mg by mouth daily as needed for Allergies. ammonium lactate (LAC-HYDRIN) 12 % topical cream rub in to affected area well  Qty: 280 g, Refills: 1      nitroglycerin (NITROSTAT) 0.4 mg SL tablet 1 Tab by SubLINGual route every five (5) minutes as needed for Chest Pain (call 911 if not relieved by 3). Qty: 25 Tab, Refills: 2    Associated Diagnoses: SVT (supraventricular tachycardia) (HCC); CHF (congestive heart failure) (HCC)         STOP taking these medications       nystatin (MYCOSTATIN) topical cream Comments:   Reason for Stopping:               My Recommended Diet, Activity, Wound Care, and follow-up labs are listed in the patient's Discharge Insturctions which I have personally completed and reviewed.     _______________________________________________________________________  DISPOSITION:    Home with Family:    Home with HH/PT/OT/RN: y   SNF/LTC:    [facility] A:    OTHER:        Condition at Discharge:  Stable  _______________________________________________________________________  Follow up with:   PCP : Macy Lovell NP  Follow-up Information     Follow up With Details Comments Jessie Guerra, NP Go on 8/24/2018 [facility] follow-up scheduled and office will call to schedule a time of visit ( If you have questions or need to reschedule please call [address] [place] [address] At [place] [address] Noemy Sumner MD Go on 9/11/2018  [address]      Elen Ruggiero MD Schedule an appointment as soon as possible for a visit 3 weeks; after seen by Dr. Daljit Millan [id number]  [phone]      Miguelina Cardozo DPM Schedule an appointment as soon as possible for a visit 2 weeks [address] [place] [address] 83. [phone]                Total time in minutes spent coordinating this discharge (includes going over instructions, follow-up, prescriptions, and preparing report for sign off to her PCP) :  40 minutes    Signed:  Astrid Robertson NP

## 2018-08-23 NOTE — PROGRESS NOTES
CM sent resumption of care referral for skilled nursing via [employer]. Norm Klinefelter was able to accept Pt. There are no further needs.              Tasneem Ayala, [facility], [address] [place] [address]  [phone]

## 2018-08-24 LAB — CANCER AG27-29 SERPL-ACNC: 20.6 U/ML (ref 0–38.6)

## 2018-08-27 NOTE — PROGRESS NOTES
Spoke with patient offered appt this week but patient declined. Appt was scheduled for 09/07/2018. Address given to patient. She verbalized understanding.

## 2018-09-13 ENCOUNTER — TELEPHONE (OUTPATIENT)
Dept: ONCOLOGY | Age: 48
End: 2018-09-13

## 2018-09-14 NOTE — TELEPHONE ENCOUNTER
ONCOLOGY NURSE NAVIGATOR    TC to Osmar Scott RN Breast Navigator [facility]. Pt had 2 appt w/ [facility], cancelled both same day as appt. TC to pt, has appt 9/18 w/ Dr. Gila Nolasco surgeon. States transporation arranged by [facility] for this vs.    Apologized \"I thought I cancelled vs\" w/ Dr. Johnson [facility] for today. \"I've had so much going on w/ my nurse agency that I've been dealing w/.  Trying to find someone to come regular to help me\"      States will wait until has 2nd opinion on Tuesday. Jose Miller will follow up to see about rescheduling 3rd appt w/ Dr. Alex Roldan. Stressed importance of obtaining treatment for breast cancer.       Follow up call 9/18       Vladislav Sarmiento RN

## 2018-09-24 ENCOUNTER — APPOINTMENT (OUTPATIENT)
Dept: GENERAL RADIOLOGY | Age: 48
DRG: 291 | End: 2018-09-24
Attending: EMERGENCY MEDICINE
Payer: MEDICARE

## 2018-09-24 ENCOUNTER — HOSPITAL ENCOUNTER (INPATIENT)
Age: 48
LOS: 3 days | Discharge: HOME | DRG: 291 | End: 2018-09-27
Attending: EMERGENCY MEDICINE | Admitting: EMERGENCY MEDICINE
Payer: MEDICARE
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
## 2018-09-24 DIAGNOSIS — J18.9 HOSPITAL-ACQUIRED PNEUMONIA: Primary | ICD-10-CM

## 2018-09-24 LAB
ANION GAP SERPL CALC-SCNC: 1 MMOL/L (ref 5–15)
ARTERIAL PATENCY WRIST A: YES
BASE EXCESS BLDA CALC-SCNC: 9.7 MMOL/L
BDY SITE: ABNORMAL
BNP SERPL-MCNC: 262 PG/ML (ref 0–125)
BREATHS.SPONTANEOUS ON VENT: 22
BUN SERPL-MCNC: 12 MG/DL (ref 6–20)
BUN/CREAT SERPL: 18 (ref 12–20)
CALCIUM SERPL-MCNC: 10.5 MG/DL (ref 8.5–10.1)
CHLORIDE SERPL-SCNC: 101 MMOL/L (ref 97–108)
CO2 SERPL-SCNC: 37 MMOL/L (ref 21–32)
CREAT SERPL-MCNC: 0.65 MG/DL (ref 0.55–1.02)
ERYTHROCYTE [DISTWIDTH] IN BLOOD BY AUTOMATED COUNT: 12.6 % (ref 11.5–14.5)
GAS FLOW.O2 O2 DELIVERY SYS: 6 L/MIN
GLUCOSE SERPL-MCNC: 131 MG/DL (ref 65–100)
HCO3 BLDA-SCNC: 40 MMOL/L (ref 22–26)
HCT VFR BLD AUTO: 41.1 % (ref 35–47)
HGB BLD-MCNC: 11.9 G/DL (ref 11.5–16)
MCH RBC QN AUTO: 28.7 PG (ref 26–34)
MCHC RBC AUTO-ENTMCNC: 29 G/DL (ref 30–36.5)
MCV RBC AUTO: 99 FL (ref 80–99)
NRBC # BLD: 0 K/UL (ref 0–0.01)
NRBC BLD-RTO: 0 PER 100 WBC
PCO2 BLDA: 79 MMHG (ref 35–45)
PH BLDA: 7.32 [PH] (ref 7.35–7.45)
PLATELET # BLD AUTO: 162 K/UL (ref 150–400)
PMV BLD AUTO: 10.9 FL (ref 8.9–12.9)
PO2 BLDA: 90 MMHG (ref 80–100)
POTASSIUM SERPL-SCNC: 4.1 MMOL/L (ref 3.5–5.1)
RBC # BLD AUTO: 4.15 M/UL (ref 3.8–5.2)
SAO2 % BLD: 96 % (ref 92–97)
SAO2% DEVICE SAO2% SENSOR NAME: ABNORMAL
SODIUM SERPL-SCNC: 139 MMOL/L (ref 136–145)
SPECIMEN SITE: ABNORMAL
TROPONIN I SERPL-MCNC: <0.05 NG/ML
WBC # BLD AUTO: 8.3 K/UL (ref 3.6–11)

## 2018-09-24 PROCEDURE — 83880 ASSAY OF NATRIURETIC PEPTIDE: CPT | Performed by: EMERGENCY MEDICINE

## 2018-09-24 PROCEDURE — 96375 TX/PRO/DX INJ NEW DRUG ADDON: CPT

## 2018-09-24 PROCEDURE — 71046 X-RAY EXAM CHEST 2 VIEWS: CPT

## 2018-09-24 PROCEDURE — 96365 THER/PROPH/DIAG IV INF INIT: CPT

## 2018-09-24 PROCEDURE — 93005 ELECTROCARDIOGRAM TRACING: CPT

## 2018-09-24 PROCEDURE — 74011000250 HC RX REV CODE- 250: Performed by: EMERGENCY MEDICINE

## 2018-09-24 PROCEDURE — 99285 EMERGENCY DEPT VISIT HI MDM: CPT

## 2018-09-24 PROCEDURE — 94640 AIRWAY INHALATION TREATMENT: CPT

## 2018-09-24 PROCEDURE — 36600 WITHDRAWAL OF ARTERIAL BLOOD: CPT | Performed by: EMERGENCY MEDICINE

## 2018-09-24 PROCEDURE — 5A09457 ASSISTANCE WITH RESPIRATORY VENTILATION, 24-96 CONSECUTIVE HOURS, CONTINUOUS POSITIVE AIRWAY PRESSURE: ICD-10-PCS | Performed by: EMERGENCY MEDICINE

## 2018-09-24 PROCEDURE — 36415 COLL VENOUS BLD VENIPUNCTURE: CPT | Performed by: EMERGENCY MEDICINE

## 2018-09-24 PROCEDURE — 84484 ASSAY OF TROPONIN QUANT: CPT | Performed by: EMERGENCY MEDICINE

## 2018-09-24 PROCEDURE — 74011000258 HC RX REV CODE- 258: Performed by: EMERGENCY MEDICINE

## 2018-09-24 PROCEDURE — 94761 N-INVAS EAR/PLS OXIMETRY MLT: CPT

## 2018-09-24 PROCEDURE — 77030029684 HC NEB SM VOL KT MONA -A

## 2018-09-24 PROCEDURE — 96367 TX/PROPH/DG ADDL SEQ IV INF: CPT

## 2018-09-24 PROCEDURE — 85027 COMPLETE CBC AUTOMATED: CPT | Performed by: EMERGENCY MEDICINE

## 2018-09-24 PROCEDURE — 74011250636 HC RX REV CODE- 250/636: Performed by: EMERGENCY MEDICINE

## 2018-09-24 PROCEDURE — 80048 BASIC METABOLIC PNL TOTAL CA: CPT | Performed by: EMERGENCY MEDICINE

## 2018-09-24 PROCEDURE — 65660000000 HC RM CCU STEPDOWN

## 2018-09-24 PROCEDURE — 82803 BLOOD GASES ANY COMBINATION: CPT | Performed by: EMERGENCY MEDICINE

## 2018-09-24 RX ORDER — IPRATROPIUM BROMIDE AND ALBUTEROL SULFATE 2.5; .5 MG/3ML; MG/3ML
3 SOLUTION RESPIRATORY (INHALATION)
Status: COMPLETED | OUTPATIENT
Start: 2018-09-24 | End: 2018-09-24

## 2018-09-24 RX ORDER — LEVOFLOXACIN 5 MG/ML
750 INJECTION, SOLUTION INTRAVENOUS EVERY 24 HOURS
Status: DISCONTINUED | OUTPATIENT
Start: 2018-09-24 | End: 2018-09-26

## 2018-09-24 RX ADMIN — METHYLPREDNISOLONE SODIUM SUCCINATE 125 MG: 125 INJECTION, POWDER, FOR SOLUTION INTRAMUSCULAR; INTRAVENOUS at 22:08

## 2018-09-24 RX ADMIN — LEVOFLOXACIN 750 MG: 5 INJECTION, SOLUTION INTRAVENOUS at 22:49

## 2018-09-24 RX ADMIN — CEFEPIME HYDROCHLORIDE 2 G: 2 INJECTION, POWDER, FOR SOLUTION INTRAVENOUS at 22:25

## 2018-09-24 RX ADMIN — IPRATROPIUM BROMIDE AND ALBUTEROL SULFATE 3 ML: .5; 3 SOLUTION RESPIRATORY (INHALATION) at 21:57

## 2018-09-24 NOTE — IP AVS SNAPSHOT
[address] [facility] 
[phone] Patient: Purnima Watson MRN: [id number] SDW:[id number] About your hospitalization You were admitted on:  September 24, 2018 You last received care in the:  [facility] 2 CARDIOPULMONARY CARE You were discharged on:  September 27, 2018 Why you were hospitalized Your primary diagnosis was:  Not on File Your diagnoses also included:  Pna (Pneumonia) Follow-up Information Follow up With Details Comments Contact Info Eugenio Huynh NP Call today PCP - follow up 3-5 days. [facility] pateint. CM spoke with April. Patient will be scheduled for post hospital follow up. [place] [address] [address] [facility] 74 
[phone] [facility] [place] [address]. On 10/30/2018 Pulmonology - New patient appointment. Tuesday October 10:15am.  Arrival time 9:45am   [phone]  appt is with Dr. Morris Abernathy [address] [place] [address] 405 Dr Butch Jimenez [address]  Will contact you regarding a hospital to home visit [facility] 
1st Floor [facility] [address] 
[phone] [address] [place] [address] on 9/28/2018 Expect a phone call from [place] to schedule a follow up visit with you in 24-48 hours. If you have questions please Contact #[phone] Visit at [facility] and  Chris Delgadillo have teamed up to make care upon discharge more convenient. A team of doctors, nurse practitioners and EMTs, that are equipped with all the tools necessary to provide advanced medical care in the comfort of your home. Discharge Orders None A check edwina indicates which time of day the medication should be taken. My Medications START taking these medications Instructions Each Dose to Equal  
 Morning Noon Evening Bedtime  
 amoxicillin-clavulanate 875-125 mg per tablet Commonly known as:  AUGMENTIN  
   
 Your last dose was: Your next dose is: Take 1 Tab by mouth every twelve (12) hours for 2 days. 1 Tab CONTINUE taking these medications Instructions Each Dose to Equal  
 Morning Noon Evening Bedtime * VENTOLIN HFA 90 mcg/actuation inhaler Generic drug:  albuterol Your last dose was: Your next dose is: Take 2 Puffs by inhalation every six (6) hours as needed for Wheezing. 2 Puff * albuterol 2.5 mg /3 mL (0.083 %) nebulizer solution Commonly known as:  PROVENTIL VENTOLIN Your last dose was: Your next dose is:    
   
   
 3 mL by Nebulization route every four (4) hours as needed for Wheezing. 2.5 mg  
    
   
   
   
  
 ammonium lactate 12 % topical cream  
Commonly known as:  LAC-HYDRIN Your last dose was: Your next dose is:    
   
   
 rub in to affected area well  
     
   
   
   
  
 aspirin 81 mg chewable tablet Your last dose was: Your next dose is: Take 81 mg by mouth daily. 81 mg  
    
   
   
   
  
 cetirizine 10 mg tablet Commonly known as:  ZYRTEC Your last dose was: Your next dose is: Take 10 mg by mouth daily as needed for Allergies. 10 mg  
    
   
   
   
  
 cyclobenzaprine 5 mg tablet Commonly known as:  FLEXERIL Your last dose was: Your next dose is: Take 5 mg by mouth two (2) times a day. flexeriol 5 mg  
    
   
   
   
  
 fluticasone 50 mcg/actuation nasal spray Commonly known as:  Raimundo Cobian Your last dose was: Your next dose is: 2 Sprays by Both Nostrils route daily. 2 Spray  
    
   
   
   
  
 glucose blood VI test strips strip Commonly known as:  EASYGLUCO TEST Your last dose was: Your next dose is:    
   
   
 by Does Not Apply route ACB/HS. glyBURIDE 5 mg tablet Commonly known as:  Dayla Seeds Your last dose was: Your next dose is: Take 5 mg by mouth Daily (before breakfast). 5 mg  
    
   
   
   
  
 guaiFENesin  mg ER tablet Commonly known as:  [address] Your last dose was: Your next dose is: Take 1 Tab by mouth two (2) times a day. 600 mg  
    
   
   
   
  
 hydrOXYzine pamoate 50 mg capsule Commonly known as:  VISTARIL Your last dose was: Your next dose is: Take 50 mg by mouth every eight (8) hours as needed for Itching. Indications: Pruritus of Skin 50 mg  
    
   
   
   
  
 ketoconazole 2 % topical cream  
Commonly known as:  NIZORAL Your last dose was: Your next dose is:    
   
   
 Apply  to affected area daily. LANTUS U-100 INSULIN 100 unit/mL injection Generic drug:  insulin glargine Your last dose was: Your next dose is:    
   
   
 15 Units by SubCUTAneous route daily. 15 Units LASIX 40 mg tablet Generic drug:  furosemide Your last dose was: Your next dose is: Take 40 mg by mouth two (2) times a day. Indications: Peripheral Edema due to Chronic Heart Failure 40 mg  
    
   
   
   
  
 lidocaine 5 % Commonly known as:  Liu Patch Your last dose was: Your next dose is:    
   
   
 2 Patches by TransDERmal route every twenty-four (24) hours. Apply patch to the affected area for 12 hours a day and remove for 12 hours a day. 2 Patch  
    
   
   
   
  
 lovastatin 40 mg tablet Commonly known as:  MEVACOR Your last dose was: Your next dose is: Take 1 Tab by mouth nightly. 40 mg  
    
   
   
   
  
 metoprolol succinate 25 mg XL tablet Commonly known as:  TOPROL-XL Your last dose was: Your next dose is: Take  by mouth two (2) times a day. nitroglycerin 0.4 mg SL tablet Commonly known as:  NITROSTAT Your last dose was: Your next dose is:    
   
   
 1 Tab by SubLINGual route every five (5) minutes as needed for Chest Pain (call 911 if not relieved by 3). 0.4 mg  
    
   
   
   
  
 nystatin powder Commonly known as:  MYCOSTATIN Your last dose was: Your next dose is:    
   
   
 Apply  to affected area two (2) times a day. oxyCODONE IR 5 mg immediate release tablet Commonly known as:  Marlene Sinclair Your last dose was: Your next dose is: Take 1 Tab by mouth every four (4) hours as needed (pain 6-10). Max Daily Amount: 30 mg.  
 5 mg  
    
   
   
   
  
 polyethylene glycol 17 gram packet Commonly known as:  Darcy Kaylyn Your last dose was: Your next dose is: Take 1 Packet by mouth daily. 17 g * Notice: This list has 2 medication(s) that are the same as other medications prescribed for you. Read the directions carefully, and ask your doctor or other care provider to review them with you. Where to Get Your Medications Information on where to get these meds will be given to you by the nurse or doctor. ! Ask your nurse or doctor about these medications  
  amoxicillin-clavulanate 875-125 mg per tablet Opioid Education Prescription Opioids: What You Need to Know: 
 
 
NAME: Carolina Alves :  1970 MRN:  [id number] Date/Time:  2018 10:43 AM 
 
ADMIT DATE: 2018 DISCHARGE DATE: 2018 Attending Physician: Giancarlo Pastrana MD 
 
DISCHARGE DIAGNOSIS: 
PNA 
DM 
HTN 
COPD Severe obesity Medications: Per above medication reconciliation. Pain Management: per above medications Recommended diet: Cardiac Diet, Diabetic Diet and limit your fluid intake to 1.5 liters per day Recommended activity: Activity as tolerated Supplemental Oxygen: Chronic Oxygen,  6  LNC at baseline Glucose management:  check your sugars before meals and at bedtime. write down the numbers and bring record to doctor's appointment Code status: Full Outside physician follow up: Follow-up Information Follow up With Details Comments Contact Info Matheus Callejas NP In 1 week  [address] 
[phone] Information obtained by : 
I understand that if any problems occur once I am at home I am to contact my physician. I understand and acknowledge receipt of the instructions indicated above. Physician's or R.N.'s Signature                                                                  Date/Time Patient or Repres Smoking Cessation Program:  
This is a free, 
phone/text/email based, smoking cessation program. The program is individualized to meet each patient's needs. To enroll use this link [email or web address] Introducing [facility] [place] [facility]! [employer] introduces Trendslide patient portal. Now you can access parts of your medical record, email your doctor's office, and request medication refills online. 1. In your internet browser, go to [email or web address] 2. Click on the First Time User? Click Here link in the Sign In box. You will see the New Member Sign Up page. 3. Enter your [employer] Access Code exactly as it appears below. You will not need to use this code after youve completed the sign-up process. If you do not sign up before the expiration date, you must request a new code. · [employer] Access Code: [email or web address] Expires: 10/25/2018  5:40 PM 
 
4. Enter the last four digits of your Social Security Number (xxxx) and Date of Birth (mm/dd/yyyy) as indicated and click Submit. You will be taken to the next sign-up page. 5. Create a [employer] ID. This will be your [employer] login ID and cannot be changed, so think of one that is secure and easy to remember. 6. Create a [employer] password. You can change your password at any time. 7. Enter your Password Reset Question and Answer. This can be used at a later time if you forget your password. 8. Enter your e-mail address. You will receive e-mail notification when new information is available in [address] 9. Click Sign Up. You can now view and download portions of your medical record. 10. Click the Download Summary menu link to download a portable copy of your medical information. If you have questions, please visit the Frequently Asked Questions section of the [employer] website. Remember, [employer] is NOT to be used for urgent needs. For medical emergencies, dial 911. Now available from your iPhone and Android! Introducing [employer] As a [employer] patient, I wanted to make you aware of our electronic visit tool called [employer]. [employer] 24/7 allows you to connect within minutes with a medical provider 24 hours a day, seven days a week via a mobile device or tablet or logging into a secure website from your computer. You can access Bandar [employer]ter from anywhere in the [place].  
 
A virtual visit might be right for you when you have a simple condition and feel like you just dont want to get out of bed, or cant get away from work for an appointment, when your regular [employer] provider is not available (evenings, weekends or holidays), or when youre out of town and need minor care. Electronic visits cost only $49 and if the [employer] 24/7 provider determines a prescription is needed to treat your condition, one can be electronically transmitted to a nearby pharmacy*. Please take a moment to enroll today if you have not already done so. The enrollment process is free and takes just a few minutes. To enroll, please download the [employer] 24/7 mireya to your tablet or phone, or visit [email or web address] to enroll on your computer. And, as an [address] [place] [address] patient with a [employer] account, the results of your visits will be scanned into your electronic medical record and your primary care provider will be able to view the scanned results. We urge you to continue to see your regular [employer] provider for your ongoing medical care. And while your primary care provider may not be the one available when you seek a [employer] virtual visit, the peace of mind you get from getting a real diagnosis real time can be priceless. For more information on [employer], view our Frequently Asked Questions (FAQs) at [email or web address]. Sincerely, 
 
Chela Miguel MD 
Chief Medical Officer [employer] *:  certain medications cannot be prescribed via [employer] Providers Seen During Your Hospitalization Provider Specialty Primary office phone Lucita Montano MD Emergency Medicine [phone] Shivani Soriano MD Hospitalist [phone] Immunizations Administered for This Admission Name Date Influenza Vaccine (Quad) PF  Deferred () Your Primary Care Physician (PCP) Primary Care Physician Office Phone Office Fax Yury SMALLS [phone] You are allergic to the following No active allergies Recent Documentation Height Weight BMI OB Status Smoking Status 1.702 m (!) 188.7 kg 65.15 kg/m2 Injection Current Every Day Smoker Emergency Contacts Name Discharge Info Relation Home Work Mobile Edwar Ro DISCHARGE CAREGIVER [3] Son [22] [phone] iBlly,Cecile DISCHARGE CAREGIVER [3] Friend [5] [phone] Patient Belongings The following personal items are in your possession at time of discharge: 
  Dental Appliances: None  Visual Aid: Glasses, With patient      Home Medications: None   Jewelry: None  Clothing: At bedside Discharge Instructions Attachments/References HEART FAILURE: AVOIDING TRIGGERS (ENGLISH) Patient Handouts Avoiding Triggers With Heart Failure: Care Instructions Your Care Instructions Triggers are anything that make your heart failure flare up. A flare-up is also called \"sudden heart failure\" or \"acute heart failure. \" When you have a flare-up, fluid builds up in your lungs, and you have problems breathing. You might need to go to the hospital. By watching for changes in your condition and avoiding triggers, you can prevent heart failure flare-ups. Follow-up care is a key part of your treatment and safety. Be sure to make and go to all appointments, and call your doctor if you are having problems. It's also a good idea to know your test results and keep a list of the medicines you take. How can you care for yourself at home? Watch for changes in your weight and condition · Weigh yourself without clothing at the same time each day. Record your weight. Call your doctor if you have sudden weight gain, such as more than 2 to 3 pounds in a day or 5 pounds in a week.  (Your doctor may suggest a different range of weight gain.) A sudden weight gain may mean that your heart failure is getting worse. · Keep a daily record of your symptoms. Write down any changes in how you feel, such as new shortness of breath, cough, or problems eating. Also record if your ankles are more swollen than usual and if you feel more tired than usual. Note anything that you ate or did that could have triggered these changes. Limit sodium Sodium causes your body to hold on to extra water. This may cause your heart failure symptoms to get worse. People get most of their sodium from processed foods. Fast food and restaurant meals also tend to be very high in sodium. · Your doctor may suggest that you limit sodium to 2,000 milligrams (mg) a day or less. That is less than 1 teaspoon of salt a day, including all the salt you eat in cooking or in packaged foods. · Read food labels on cans and food packages. They tell you how much sodium you get in one serving. Check the serving size. If you eat more than one serving, you are getting more sodium. · Be aware that sodium can come in forms other than salt, including monosodium glutamate (MSG), sodium citrate, and sodium bicarbonate (baking soda). MSG is often added to Asian food. You can sometimes ask for food without MSG or salt. · Slowly reducing salt will help you adjust to the taste. Take the salt shaker off the table. · Flavor your food with garlic, lemon juice, onion, vinegar, herbs, and spices instead of salt. Do not use soy sauce, steak sauce, onion salt, garlic salt, mustard, or ketchup on your food, unless it is labeled \"low-sodium\" or \"low-salt. \" 
· Make your own salad dressings, sauces, and ketchup without adding salt. · Use fresh or frozen ingredients, instead of canned ones, whenever you can. Choose low-sodium canned goods. · Eat less processed food and food from restaurants, including fast food. Exercise as directed Moderate, regular exercise is very good for your heart.  It improves your blood flow and helps control your weight. But too much exercise can stress your heart and cause a heart failure flare-up. · Check with your doctor before you start an exercise program. 
· Walking is an easy way to get exercise. Start out slowly. Gradually increase the length and pace of your walk. Swimming, riding a bike, and using a treadmill are also good forms of exercise. · When you exercise, watch for signs that your heart is working too hard. You are pushing yourself too hard if you cannot talk while you are exercising. If you become short of breath or dizzy or have chest pain, stop, sit down, and rest. 
· Do not exercise when you do not feel well. Take medicines correctly · Take your medicines exactly as prescribed. Call your doctor if you think you are having a problem with your medicine. · Make a list of all the medicines you take. Include those prescribed to you by other doctors and any over-the-counter medicines, vitamins, or supplements you take. Take this list with you when you go to any doctor. · Take your medicines at the same time every day. It may help you to post a list of all the medicines you take every day and what time of day you take them. · Make taking your medicine as simple as you can. Plan times to take your medicines when you are doing other things, such as eating a meal or getting ready for bed. This will make it easier to remember to take your medicines. · Get organized. Use helpful tools, such as daily or weekly pill containers. When should you call for help? Call 911 if you have symptoms of sudden heart failure such as: 
  · You have severe trouble breathing.  
  · You cough up pink, foamy mucus.  
  · You have a new irregular or rapid heartbeat.  
 Call your doctor now or seek immediate medical care if: 
  · You have new or increased shortness of breath.  
  · You are dizzy or lightheaded, or you feel like you may faint.   · You have sudden weight gain, such as more than 2 to 3 pounds in a day or 5 pounds in a week. (Your doctor may suggest a different range of weight gain.)  
  · You have increased swelling in your legs, ankles, or feet.  
  · You are suddenly so tired or weak that you cannot do your usual activities.  
 Watch closely for changes in your health, and be sure to contact your doctor if you develop new symptoms. Where can you learn more? Go to http://cassia-donte.info/. Enter N941 in the search box to learn more about \"Avoiding Triggers With Heart Failure: Care Instructions. \" Current as of: December 6, 2017 Content Version: 11.7 © 6742-4338 [employer], ChartIT[employer]. Care instructions adapted under license by [employer] (which disclaims liability or warranty for this information). If you have questions about a medical condition or this instruction, always ask your healthcare professional. [address] any warranty or liability for your use of this information. Please provide this summary of care documentation to your next provider. Signatures-by signing, you are acknowledging that this After Visit Summary has been reviewed with you and you have received a copy. Patient Signature:  ____________________________________________________________ Date:  ____________________________________________________________  
  
Darby Newcomer Provider Signature:  ____________________________________________________________ Date:  ____________________________________________________________

## 2018-09-24 NOTE — ED TRIAGE NOTES
Pt states SOB has been going a few days, but couldn't take it any longer. Pt stays on 6L O2 nasal cannula at all times.

## 2018-09-25 ENCOUNTER — PATIENT OUTREACH (OUTPATIENT)
Dept: CASE MANAGEMENT | Age: 48
End: 2018-09-25

## 2018-09-25 LAB
ALBUMIN SERPL-MCNC: 3.1 G/DL (ref 3.5–5)
ALBUMIN/GLOB SERPL: 0.6 {RATIO} (ref 1.1–2.2)
ALP SERPL-CCNC: 88 U/L (ref 45–117)
ALT SERPL-CCNC: 18 U/L (ref 12–78)
ANION GAP SERPL CALC-SCNC: 6 MMOL/L (ref 5–15)
AST SERPL-CCNC: 12 U/L (ref 15–37)
BILIRUB DIRECT SERPL-MCNC: 0.1 MG/DL (ref 0–0.2)
BILIRUB SERPL-MCNC: 0.3 MG/DL (ref 0.2–1)
BUN SERPL-MCNC: 13 MG/DL (ref 6–20)
BUN/CREAT SERPL: 14 (ref 12–20)
CA-I BLD-SCNC: 1.29 MMOL/L (ref 1.13–1.32)
CALCIUM SERPL-MCNC: 10.5 MG/DL (ref 8.5–10.1)
CALCIUM SERPL-MCNC: 9.7 MG/DL (ref 8.5–10.1)
CHLORIDE SERPL-SCNC: 99 MMOL/L (ref 97–108)
CO2 SERPL-SCNC: 34 MMOL/L (ref 21–32)
CREAT SERPL-MCNC: 0.92 MG/DL (ref 0.55–1.02)
GLOBULIN SER CALC-MCNC: 5 G/DL (ref 2–4)
GLUCOSE BLD STRIP.AUTO-MCNC: 289 MG/DL (ref 65–100)
GLUCOSE BLD STRIP.AUTO-MCNC: 294 MG/DL (ref 65–100)
GLUCOSE BLD STRIP.AUTO-MCNC: 297 MG/DL (ref 65–100)
GLUCOSE BLD STRIP.AUTO-MCNC: 314 MG/DL (ref 65–100)
GLUCOSE SERPL-MCNC: 323 MG/DL (ref 65–100)
MAGNESIUM SERPL-MCNC: 1.9 MG/DL (ref 1.6–2.4)
PHOSPHATE SERPL-MCNC: 1.5 MG/DL (ref 2.6–4.7)
POTASSIUM SERPL-SCNC: 4 MMOL/L (ref 3.5–5.1)
PROT SERPL-MCNC: 8.1 G/DL (ref 6.4–8.2)
PTH-INTACT SERPL-MCNC: 117.5 PG/ML (ref 18.4–88)
SERVICE CMNT-IMP: ABNORMAL
SODIUM SERPL-SCNC: 139 MMOL/L (ref 136–145)

## 2018-09-25 PROCEDURE — 83970 ASSAY OF PARATHORMONE: CPT

## 2018-09-25 PROCEDURE — 74011000250 HC RX REV CODE- 250: Performed by: EMERGENCY MEDICINE

## 2018-09-25 PROCEDURE — 80048 BASIC METABOLIC PNL TOTAL CA: CPT

## 2018-09-25 PROCEDURE — 74011636637 HC RX REV CODE- 636/637: Performed by: EMERGENCY MEDICINE

## 2018-09-25 PROCEDURE — 74011250637 HC RX REV CODE- 250/637: Performed by: EMERGENCY MEDICINE

## 2018-09-25 PROCEDURE — 87070 CULTURE OTHR SPECIMN AEROBIC: CPT

## 2018-09-25 PROCEDURE — 74011000258 HC RX REV CODE- 258: Performed by: EMERGENCY MEDICINE

## 2018-09-25 PROCEDURE — 94640 AIRWAY INHALATION TREATMENT: CPT

## 2018-09-25 PROCEDURE — 84100 ASSAY OF PHOSPHORUS: CPT

## 2018-09-25 PROCEDURE — 77010033678 HC OXYGEN DAILY

## 2018-09-25 PROCEDURE — 82962 GLUCOSE BLOOD TEST: CPT

## 2018-09-25 PROCEDURE — 74011250636 HC RX REV CODE- 250/636: Performed by: EMERGENCY MEDICINE

## 2018-09-25 PROCEDURE — 74011250637 HC RX REV CODE- 250/637: Performed by: GENERAL ACUTE CARE HOSPITAL

## 2018-09-25 PROCEDURE — 83735 ASSAY OF MAGNESIUM: CPT

## 2018-09-25 PROCEDURE — 74011250636 HC RX REV CODE- 250/636: Performed by: GENERAL ACUTE CARE HOSPITAL

## 2018-09-25 PROCEDURE — 74011250637 HC RX REV CODE- 250/637: Performed by: INTERNAL MEDICINE

## 2018-09-25 PROCEDURE — 82330 ASSAY OF CALCIUM: CPT

## 2018-09-25 PROCEDURE — 94760 N-INVAS EAR/PLS OXIMETRY 1: CPT

## 2018-09-25 PROCEDURE — 36415 COLL VENOUS BLD VENIPUNCTURE: CPT

## 2018-09-25 PROCEDURE — 80076 HEPATIC FUNCTION PANEL: CPT

## 2018-09-25 PROCEDURE — 65660000000 HC RM CCU STEPDOWN

## 2018-09-25 RX ORDER — IPRATROPIUM BROMIDE AND ALBUTEROL SULFATE 2.5; .5 MG/3ML; MG/3ML
3 SOLUTION RESPIRATORY (INHALATION)
Status: DISCONTINUED | OUTPATIENT
Start: 2018-09-25 | End: 2018-09-26

## 2018-09-25 RX ORDER — HYDROXYZINE PAMOATE 25 MG/1
50 CAPSULE ORAL
Status: DISCONTINUED | OUTPATIENT
Start: 2018-09-25 | End: 2018-09-27 | Stop reason: HOSPADM

## 2018-09-25 RX ORDER — SODIUM CHLORIDE 0.9 % (FLUSH) 0.9 %
5-10 SYRINGE (ML) INJECTION AS NEEDED
Status: DISCONTINUED | OUTPATIENT
Start: 2018-09-25 | End: 2018-09-27 | Stop reason: HOSPADM

## 2018-09-25 RX ORDER — HEPARIN SODIUM 5000 [USP'U]/ML
7500 INJECTION, SOLUTION INTRAVENOUS; SUBCUTANEOUS EVERY 8 HOURS
Status: DISCONTINUED | OUTPATIENT
Start: 2018-09-25 | End: 2018-09-27 | Stop reason: HOSPADM

## 2018-09-25 RX ORDER — CYCLOBENZAPRINE HCL 10 MG
5 TABLET ORAL 2 TIMES DAILY
Status: DISCONTINUED | OUTPATIENT
Start: 2018-09-25 | End: 2018-09-27 | Stop reason: HOSPADM

## 2018-09-25 RX ORDER — INSULIN GLARGINE 100 [IU]/ML
15 INJECTION, SOLUTION SUBCUTANEOUS DAILY
Status: DISCONTINUED | OUTPATIENT
Start: 2018-09-25 | End: 2018-09-26

## 2018-09-25 RX ORDER — POLYETHYLENE GLYCOL 3350 17 G/17G
17 POWDER, FOR SOLUTION ORAL DAILY
Status: DISCONTINUED | OUTPATIENT
Start: 2018-09-25 | End: 2018-09-27 | Stop reason: HOSPADM

## 2018-09-25 RX ORDER — LIDOCAINE 4 G/100G
2 PATCH TOPICAL
Status: DISCONTINUED | OUTPATIENT
Start: 2018-09-25 | End: 2018-09-27 | Stop reason: HOSPADM

## 2018-09-25 RX ORDER — OXYCODONE HYDROCHLORIDE 5 MG/1
5 TABLET ORAL
Status: DISCONTINUED | OUTPATIENT
Start: 2018-09-25 | End: 2018-09-27 | Stop reason: HOSPADM

## 2018-09-25 RX ORDER — DEXTROSE 50 % IN WATER (D50W) INTRAVENOUS SYRINGE
12.5-25 AS NEEDED
Status: DISCONTINUED | OUTPATIENT
Start: 2018-09-25 | End: 2018-09-27 | Stop reason: HOSPADM

## 2018-09-25 RX ORDER — AMMONIUM LACTATE 12 G/100G
LOTION TOPICAL 2 TIMES DAILY
Status: DISCONTINUED | OUTPATIENT
Start: 2018-09-25 | End: 2018-09-27 | Stop reason: HOSPADM

## 2018-09-25 RX ORDER — DIPHENHYDRAMINE HYDROCHLORIDE 50 MG/ML
25 INJECTION, SOLUTION INTRAMUSCULAR; INTRAVENOUS ONCE
Status: COMPLETED | OUTPATIENT
Start: 2018-09-25 | End: 2018-09-25

## 2018-09-25 RX ORDER — SODIUM CHLORIDE 0.9 % (FLUSH) 0.9 %
5-10 SYRINGE (ML) INJECTION EVERY 8 HOURS
Status: DISCONTINUED | OUTPATIENT
Start: 2018-09-25 | End: 2018-09-27 | Stop reason: HOSPADM

## 2018-09-25 RX ORDER — PRAVASTATIN SODIUM 10 MG/1
40 TABLET ORAL
Status: DISCONTINUED | OUTPATIENT
Start: 2018-09-25 | End: 2018-09-27 | Stop reason: HOSPADM

## 2018-09-25 RX ORDER — GLYBURIDE 5 MG/1
5 TABLET ORAL
Status: DISCONTINUED | OUTPATIENT
Start: 2018-09-25 | End: 2018-09-25

## 2018-09-25 RX ORDER — OXYCODONE AND ACETAMINOPHEN 5; 325 MG/1; MG/1
1 TABLET ORAL
Status: DISCONTINUED | OUTPATIENT
Start: 2018-09-25 | End: 2018-09-25

## 2018-09-25 RX ORDER — METOPROLOL SUCCINATE 50 MG/1
25 TABLET, EXTENDED RELEASE ORAL 2 TIMES DAILY
Status: DISCONTINUED | OUTPATIENT
Start: 2018-09-25 | End: 2018-09-25 | Stop reason: SDUPTHER

## 2018-09-25 RX ORDER — GUAIFENESIN 600 MG/1
600 TABLET, EXTENDED RELEASE ORAL 2 TIMES DAILY
Status: DISCONTINUED | OUTPATIENT
Start: 2018-09-25 | End: 2018-09-27 | Stop reason: HOSPADM

## 2018-09-25 RX ORDER — HEPARIN SODIUM 5000 [USP'U]/ML
5000 INJECTION, SOLUTION INTRAVENOUS; SUBCUTANEOUS EVERY 8 HOURS
Status: DISCONTINUED | OUTPATIENT
Start: 2018-09-25 | End: 2018-09-25 | Stop reason: DRUGHIGH

## 2018-09-25 RX ORDER — NALOXONE HYDROCHLORIDE 0.4 MG/ML
0.4 INJECTION, SOLUTION INTRAMUSCULAR; INTRAVENOUS; SUBCUTANEOUS AS NEEDED
Status: DISCONTINUED | OUTPATIENT
Start: 2018-09-25 | End: 2018-09-27 | Stop reason: HOSPADM

## 2018-09-25 RX ORDER — GUAIFENESIN 100 MG/5ML
81 LIQUID (ML) ORAL DAILY
Status: DISCONTINUED | OUTPATIENT
Start: 2018-09-25 | End: 2018-09-27 | Stop reason: HOSPADM

## 2018-09-25 RX ORDER — PREDNISONE 20 MG/1
40 TABLET ORAL
Status: DISCONTINUED | OUTPATIENT
Start: 2018-09-26 | End: 2018-09-27

## 2018-09-25 RX ORDER — ONDANSETRON 2 MG/ML
4 INJECTION INTRAMUSCULAR; INTRAVENOUS
Status: DISCONTINUED | OUTPATIENT
Start: 2018-09-25 | End: 2018-09-27 | Stop reason: HOSPADM

## 2018-09-25 RX ORDER — DIPHENHYDRAMINE HCL 25 MG
25 CAPSULE ORAL
Status: DISCONTINUED | OUTPATIENT
Start: 2018-09-25 | End: 2018-09-25

## 2018-09-25 RX ORDER — ACETAMINOPHEN 325 MG/1
650 TABLET ORAL
Status: DISCONTINUED | OUTPATIENT
Start: 2018-09-25 | End: 2018-09-27 | Stop reason: HOSPADM

## 2018-09-25 RX ORDER — MICONAZOLE NITRATE 20 MG/G
CREAM TOPICAL 2 TIMES DAILY
Status: DISCONTINUED | OUTPATIENT
Start: 2018-09-25 | End: 2018-09-27 | Stop reason: HOSPADM

## 2018-09-25 RX ORDER — METOPROLOL TARTRATE 25 MG/1
25 TABLET, FILM COATED ORAL EVERY 12 HOURS
Status: DISCONTINUED | OUTPATIENT
Start: 2018-09-25 | End: 2018-09-27 | Stop reason: HOSPADM

## 2018-09-25 RX ORDER — INSULIN LISPRO 100 [IU]/ML
INJECTION, SOLUTION INTRAVENOUS; SUBCUTANEOUS
Status: DISCONTINUED | OUTPATIENT
Start: 2018-09-25 | End: 2018-09-27 | Stop reason: HOSPADM

## 2018-09-25 RX ORDER — FUROSEMIDE 10 MG/ML
40 INJECTION INTRAMUSCULAR; INTRAVENOUS DAILY
Status: DISCONTINUED | OUTPATIENT
Start: 2018-09-25 | End: 2018-09-27 | Stop reason: HOSPADM

## 2018-09-25 RX ORDER — MAGNESIUM SULFATE 100 %
4 CRYSTALS MISCELLANEOUS AS NEEDED
Status: DISCONTINUED | OUTPATIENT
Start: 2018-09-25 | End: 2018-09-27 | Stop reason: HOSPADM

## 2018-09-25 RX ADMIN — Medication 10 ML: at 14:00

## 2018-09-25 RX ADMIN — HEPARIN SODIUM 7500 UNITS: 5000 INJECTION, SOLUTION INTRAVENOUS; SUBCUTANEOUS at 01:58

## 2018-09-25 RX ADMIN — DIPHENHYDRAMINE HYDROCHLORIDE 25 MG: 50 INJECTION, SOLUTION INTRAMUSCULAR; INTRAVENOUS at 01:59

## 2018-09-25 RX ADMIN — CEFEPIME HYDROCHLORIDE 2 G: 2 INJECTION, POWDER, FOR SOLUTION INTRAVENOUS at 05:55

## 2018-09-25 RX ADMIN — GLYBURIDE 5 MG: 5 TABLET ORAL at 08:55

## 2018-09-25 RX ADMIN — METOPROLOL TARTRATE 25 MG: 25 TABLET ORAL at 08:55

## 2018-09-25 RX ADMIN — IPRATROPIUM BROMIDE AND ALBUTEROL SULFATE 3 ML: .5; 3 SOLUTION RESPIRATORY (INHALATION) at 03:39

## 2018-09-25 RX ADMIN — CYCLOBENZAPRINE HYDROCHLORIDE 5 MG: 10 TABLET, FILM COATED ORAL at 17:15

## 2018-09-25 RX ADMIN — IPRATROPIUM BROMIDE AND ALBUTEROL SULFATE 3 ML: .5; 3 SOLUTION RESPIRATORY (INHALATION) at 15:32

## 2018-09-25 RX ADMIN — CEFEPIME HYDROCHLORIDE 2 G: 2 INJECTION, POWDER, FOR SOLUTION INTRAVENOUS at 21:22

## 2018-09-25 RX ADMIN — METOPROLOL TARTRATE 25 MG: 25 TABLET ORAL at 21:15

## 2018-09-25 RX ADMIN — INSULIN GLARGINE 15 UNITS: 100 INJECTION, SOLUTION SUBCUTANEOUS at 08:55

## 2018-09-25 RX ADMIN — DIBASIC SODIUM PHOSPHATE, MONOBASIC POTASSIUM PHOSPHATE AND MONOBASIC SODIUM PHOSPHATE 2 TABLET: 852; 155; 130 TABLET ORAL at 16:19

## 2018-09-25 RX ADMIN — METHYLPREDNISOLONE SODIUM SUCCINATE 40 MG: 40 INJECTION, POWDER, FOR SOLUTION INTRAMUSCULAR; INTRAVENOUS at 05:55

## 2018-09-25 RX ADMIN — INSULIN LISPRO 5 UNITS: 100 INJECTION, SOLUTION INTRAVENOUS; SUBCUTANEOUS at 17:15

## 2018-09-25 RX ADMIN — HEPARIN SODIUM 7500 UNITS: 5000 INJECTION, SOLUTION INTRAVENOUS; SUBCUTANEOUS at 17:16

## 2018-09-25 RX ADMIN — Medication 10 ML: at 02:03

## 2018-09-25 RX ADMIN — Medication: at 17:38

## 2018-09-25 RX ADMIN — INSULIN LISPRO 3 UNITS: 100 INJECTION, SOLUTION INTRAVENOUS; SUBCUTANEOUS at 21:48

## 2018-09-25 RX ADMIN — INSULIN LISPRO 7 UNITS: 100 INJECTION, SOLUTION INTRAVENOUS; SUBCUTANEOUS at 12:22

## 2018-09-25 RX ADMIN — CYCLOBENZAPRINE HYDROCHLORIDE 5 MG: 10 TABLET, FILM COATED ORAL at 08:55

## 2018-09-25 RX ADMIN — IPRATROPIUM BROMIDE AND ALBUTEROL SULFATE 3 ML: .5; 3 SOLUTION RESPIRATORY (INHALATION) at 11:12

## 2018-09-25 RX ADMIN — IPRATROPIUM BROMIDE AND ALBUTEROL SULFATE 3 ML: .5; 3 SOLUTION RESPIRATORY (INHALATION) at 08:00

## 2018-09-25 RX ADMIN — INSULIN LISPRO 5 UNITS: 100 INJECTION, SOLUTION INTRAVENOUS; SUBCUTANEOUS at 07:30

## 2018-09-25 RX ADMIN — GUAIFENESIN 600 MG: 600 TABLET, EXTENDED RELEASE ORAL at 08:55

## 2018-09-25 RX ADMIN — PRAVASTATIN SODIUM 40 MG: 10 TABLET ORAL at 21:15

## 2018-09-25 RX ADMIN — LEVOFLOXACIN 750 MG: 5 INJECTION, SOLUTION INTRAVENOUS at 22:43

## 2018-09-25 RX ADMIN — POLYETHYLENE GLYCOL 3350 17 G: 17 POWDER, FOR SOLUTION ORAL at 08:54

## 2018-09-25 RX ADMIN — CEFEPIME HYDROCHLORIDE 2 G: 2 INJECTION, POWDER, FOR SOLUTION INTRAVENOUS at 14:18

## 2018-09-25 RX ADMIN — HEPARIN SODIUM 7500 UNITS: 5000 INJECTION, SOLUTION INTRAVENOUS; SUBCUTANEOUS at 09:07

## 2018-09-25 RX ADMIN — ASPIRIN 81 MG 81 MG: 81 TABLET ORAL at 08:55

## 2018-09-25 RX ADMIN — GUAIFENESIN 600 MG: 600 TABLET, EXTENDED RELEASE ORAL at 17:15

## 2018-09-25 RX ADMIN — Medication 10 ML: at 21:16

## 2018-09-25 RX ADMIN — METHYLPREDNISOLONE SODIUM SUCCINATE 40 MG: 40 INJECTION, POWDER, FOR SOLUTION INTRAMUSCULAR; INTRAVENOUS at 14:17

## 2018-09-25 RX ADMIN — IPRATROPIUM BROMIDE AND ALBUTEROL SULFATE 3 ML: .5; 3 SOLUTION RESPIRATORY (INHALATION) at 20:50

## 2018-09-25 RX ADMIN — FUROSEMIDE 40 MG: 10 INJECTION, SOLUTION INTRAMUSCULAR; INTRAVENOUS at 08:54

## 2018-09-25 RX ADMIN — MICONAZOLE NITRATE: 20 CREAM TOPICAL at 17:38

## 2018-09-25 NOTE — PROGRESS NOTES
[facility] met with patient today to provide an introduction to self, the [address] at [facility]. Bundled Payment Care Program:    Patient was provided a copy of the [facility] letter. Patient states that they do not have a functioning scale at home. Patient was not provided a scale during this visit due to limited interaction.      Heart Failure Team member to will follow up with patient for delivery of scale and introduction to [facility].

## 2018-09-25 NOTE — PROGRESS NOTES
TRANSFER - IN REPORT: 
 
Verbal report received from Edwar(name) on Yahaira Diaz  being received from ER(unit) for routine progression of care Report consisted of patients Situation, Background, Assessment and  
Recommendations(SBAR). Information from the following report(s) SBAR, Kardex, Intake/Output, MAR and Recent Results was reviewed with the receiving nurse. Opportunity for questions and clarification was provided. Assessment completed upon patients arrival to unit and care assumed. Primary Nurse Maritza Calderón RN and Dennis Dumont RN performed a dual skin assessment on this patient Impairment noted- see wound doc flow sheet Jeffrey score is 19 Bedside shift change report given to Adama Seymour RN (oncoming nurse). Report included the following information SBAR, Kardex, Intake/Output, MAR and Recent Results. SHIFT SUMMARY: 
 
 
 
 
 
[facility] NURSING NOTE Admission Date 9/24/2018 Admission Diagnosis PNA (pneumonia) Consults None Cardiac Monitoring [x] Yes [] No  
  
Purposeful Hourly Rounding [x] Yes   
Yu Score Total Score: 3 Yu score 3 or > [x] Bed Alarm [] Avasys [] 1:1 sitter [] Patient refused (Signed refusal form in chart) Jeffrey Score Jeffrey Score: 18 Jeffrey score 14 or < [x] PMT consult [x] Wound Care consult  
 []  Specialty bed  [] Nutrition consult Influenza Vaccine Oxygen needs? [] Room air Oxygen @  []1L    []2L    []3L   []4L    []5L   [x]6L via NC Chronic home O2 use? [x] Yes [] No 
Perform O2 challenge test and document in progress note using smartphrase (.Homeoxygen) Last bowel movement Last Bowel Movement Date: 09/24/18 Urinary Catheter LDAs Peripheral IV 09/24/18 Left Antecubital (Active) Site Assessment Clean, dry, & intact 9/25/2018  1:34 AM  
Phlebitis Assessment 0 9/25/2018  1:34 AM  
Infiltration Assessment 0 9/25/2018  1:34 AM  
Dressing Status Clean, dry, & intact 9/25/2018  1:34 AM  
 Dressing Type Transparent 9/25/2018  1:34 AM  
Hub Color/Line Status Pink;Flushed 9/25/2018  1:34 AM  
                  
  
Readmission Risk Assessment Tool Score High Risk   
      
 30 Total Score 3 Has Seen PCP in Last 6 Months (Yes=3, No=0)  
 11 IP Visits Last 12 Months (1-3=4, 4=9, >4=11) 9 Pt. Coverage (Medicare=5 , Medicaid, or Self-Pay=4) 7 Charlson Comorbidity Score (Age + Comorbid Conditions) Criteria that do not apply:  
 . Living with Significant Other. Assisted Living. LTAC. SNF. or  
Rehab Patient Length of Stay (>5 days = 3) Expected Length of Stay - - - Actual Length of Stay 1

## 2018-09-25 NOTE — PROGRESS NOTES
ADULT PROTOCOL: JET AEROSOL ASSESSMENT Patient  Maryam Olmstead     57 y.o.   female     9/25/2018  9:24 AM 
 
Breath Sounds Pre Procedure: Right Breath Sounds: Diminished Left Breath Sounds: Diminished Breath Sounds Post Procedure: Right Breath Sounds: Diminished Left Breath Sounds: Diminished Breathing pattern: Pre procedure Breathing Pattern: Regular Post procedure Breathing Pattern: Regular Heart Rate: Pre procedure Pulse: 94 
         Post procedure Pulse: 95 Resp Rate: Pre procedure Respirations: 20 
         Post procedure Respirations: 20 
 
 
Cough: Pre procedure Cough: Non-productive Post procedure Cough: Non-productive Oxygen: O2 Device: Nasal cannula Changed: no 
 
SpO2: Pre procedure SpO2: 96 % Post procedure SpO2: 96 % Nebulizer Therapy: Current medications Aerosolized Medications: DuoNeb Changed:no takes txs at home Problem List:  
Patient Active Problem List  
Diagnosis Code  CHF (congestive heart failure) ([facility]) I50.9  Malignant essential hypertension I10  
 Asthma J45.909  Obesity hypoventilation syndrome ([facility]) E66.2  Dyspnea R06.00  Chest pressure R07.89  
 Acute on chronic diastolic heart failure ([facility]) I50.33  
 SVT (supraventricular tachycardia) ([facility]) I47.1  NSTEMI (non-ST elevated myocardial infarction) ([facility]) I21.4  
 S/P PTCA (percutaneous transluminal coronary angioplasty) Z98.61  
 Coronary atherosclerosis of native coronary artery I25.10  Hypoxia R09.02  
 Noncompliance with therapeutic plan Z91.11  
 Chest pain R07.9  GERD (gastroesophageal reflux disease) K21.9  Acute respiratory failure with hypoxia and hypercarbia ([facility]) J96.01, J96.02  
 COPD (chronic obstructive pulmonary disease) ([facility]) J44.9  Tobacco abuse Z72.0  Obesity, morbid (more than 100 lbs over ideal weight or BMI > 40) ([facility]) E66.01  
  Cellulitis L03.90  
 SIRS due to infectious process with acute organ dysfunction (HCC) A41.9, R65.20  Sepsis associated hypotension (HCC) A41.9  Gangrenous toe ([facility] [id number]  Type II diabetes mellitus, uncontrolled (HCC) E11.65  
 HTN (hypertension) D92  
 Diastolic CHF, chronic (HCC) I50.32  
 Cough with hemoptysis R04.2  Lymphedema of both lower extremities I89.0  
 CAP (community acquired pneumonia) J18.9  Cellulitis, leg L03.119  
 COPD exacerbation (HCC) J44.1  Maggot infestation B87.9  
 SOB (shortness of breath) R06.02  Shortness of breath R06.02  
 Multifocal pneumonia J18.9  Acute respiratory failure ([facility]) J96.00  
 Acute on chronic respiratory failure with hypoxia and hypercapnia (HCC) J96.21, J96.22  
 NSVT (nonsustained ventricular tachycardia) ([facility]) I47.2  Respiratory failure ([facility] J96.90  Elevated lipase R74.8  Abdominal pain R10.9  Counseling regarding goals of care Z71.89  
 Acute pancreatitis K85.90  Breast cancer ([facility] C50.919  Intertrigo L30.4  PNA (pneumonia) J18.9 Respiratory Therapist: Will Hollingsworth RT

## 2018-09-25 NOTE — PROGRESS NOTES
09/25/18 1535 Vitals Temp 98.5 °F (36.9 °C) Temp Source Oral  
Pulse (Heart Rate) 87 Heart Rate Source Monitor Resp Rate 22  
O2 Sat (%) 92 % Level of Consciousness Alert /44 MAP (Calculated) (!) 63 BP 1 Location Right arm BP 1 Method Automatic  
BP Patient Position Lying left side MEWS Score 3 Oxygen Therapy O2 Device Nasal cannula O2 Flow Rate (L/min) 6 l/min Patient Observation Activity In bed;Watching t.v.  
MEWS of 3, /44, diuresing patient, no dizziness.  Will notify MD.

## 2018-09-25 NOTE — PROGRESS NOTES
Physical Therapy: 
 
Orders received and chart reviewed. Attempted PT evaluation. Pt received supine in bed. Pt declining therapy at this time stating she was admitted late last night and was hoping to rest right now. Will defer and continue to follow. Spoke with RN and patient has been up to the chair and BSC already.   
 
Jesika Chavez, PT, DPT

## 2018-09-25 NOTE — ED PROVIDER NOTES
EMERGENCY DEPARTMENT HISTORY AND PHYSICAL EXAM 
 
 
Date: 9/24/2018 Patient Name: Miesha Pruitt History of Presenting Illness Chief Complaint Patient presents with  Shortness of Breath History Provided By: Patient HPI: Miesha Pruitt, 57 y.o. female with PMHx significant for asthma, arthritis, CHF, HTN, SVT, NSTEMI, CAD, COPD on 6L NC O2 all the time, morbid obesity, DM, presents via EMS to the ED with cc of acute on chronic SOB with associated dry cough, HA, chest and sinus congestion x several days. Pt reports that her SOB is significantly exacerbated by any exertional activity. Additionally reports increased BLE swelling. She is compliant with her Lasix QD, but does not feel she is undergoing diuresis appropriately . Does wear 6L O2 NC all the time. Pt sees Dr. Bard West, but does not f/u with him regularly. Denies CP, fever. There are no other complaints, changes, or physical findings at this time. PCP: Erendira Dawn NP Current Facility-Administered Medications Medication Dose Route Frequency Provider Last Rate Last Dose  cefepime (MAXIPIME) 2 g in 0.9% sodium chloride (MBP/ADV) 100 mL  2 g IntraVENous Q8H Gil Simeon  mL/hr at 09/24/18 2225 2 g at 09/24/18 2225  levoFLOXacin (LEVAQUIN) 750 mg in D5W IVPB  750 mg IntraVENous Q24H Gil Simeon MD      
 
Current Outpatient Prescriptions Medication Sig Dispense Refill  ketoconazole (NIZORAL) 2 % topical cream Apply  to affected area daily. 15 g 0  
 nystatin (MYCOSTATIN) powder Apply  to affected area two (2) times a day. 1 Bottle 0  
 guaiFENesin ER (MUCINEX) 600 mg ER tablet Take 1 Tab by mouth two (2) times a day. 14 Tab 0  
 lidocaine (LIDODERM) 5 % 2 Patches by TransDERmal route every twenty-four (24) hours. Apply patch to the affected area for 12 hours a day and remove for 12 hours a day.  10 Each 0  
 oxyCODONE IR (ROXICODONE) 5 mg immediate release tablet Take 1 Tab by mouth every four (4) hours as needed (pain 6-10). Max Daily Amount: 30 mg. 15 Tab 0  
 polyethylene glycol (MIRALAX) 17 gram packet Take 1 Packet by mouth daily. 30 Packet 0  
 glucose blood VI test strips (EASYGLUCO TEST) strip by Does Not Apply route ACB/HS. 100 Strip 0  
 albuterol (VENTOLIN HFA) 90 mcg/actuation inhaler Take 2 Puffs by inhalation every six (6) hours as needed for Wheezing.  cyclobenzaprine (FLEXERIL) 5 mg tablet Take 5 mg by mouth two (2) times a day. flexeriol  metoprolol succinate (TOPROL-XL) 25 mg XL tablet Take  by mouth two (2) times a day.  furosemide (LASIX) 40 mg tablet Take 40 mg by mouth two (2) times a day. Indications: Peripheral Edema due to Chronic Heart Failure  albuterol (PROVENTIL VENTOLIN) 2.5 mg /3 mL (0.083 %) nebulizer solution 3 mL by Nebulization route every four (4) hours as needed for Wheezing. 24 Each 0  
 aspirin 81 mg chewable tablet Take 81 mg by mouth daily.  insulin glargine (LANTUS) 100 unit/mL injection 15 Units by SubCUTAneous route daily.  hydrOXYzine pamoate (VISTARIL) 50 mg capsule Take 50 mg by mouth every eight (8) hours as needed for Itching. Indications: Pruritus of Skin  fluticasone (FLONASE) 50 mcg/actuation nasal spray 2 Sprays by Both Nostrils route daily. 1 Bottle 0  
 lovastatin (MEVACOR) 40 mg tablet Take 1 Tab by mouth nightly. 30 Tab 6  
 glyBURIDE (DIABETA) 5 mg tablet Take 5 mg by mouth Daily (before breakfast).  cetirizine (ZYRTEC) 10 mg tablet Take 10 mg by mouth daily as needed for Allergies.  ammonium lactate (LAC-HYDRIN) 12 % topical cream rub in to affected area well 280 g 1  
 nitroglycerin (NITROSTAT) 0.4 mg SL tablet 1 Tab by SubLINGual route every five (5) minutes as needed for Chest Pain (call 911 if not relieved by 3). 25 Tab 2 Past History Past Medical History: 
Past Medical History:  
Diagnosis Date  Arthritis  Asthma  Breast lump  CAD (coronary artery disease)  Congestive heart failure ([facility]  COPD (chronic obstructive pulmonary disease) ([facility])  Diabetes ([facility]  Hypertension  Morbid obesity with BMI of 70 and over, adult ([facility]  NSTEMI (non-ST elevated myocardial infarction) ([facility]  SVT (supraventricular tachycardia) ([facility]) Past Surgical History: 
Past Surgical History:  
Procedure Laterality Date  CARDIAC SURG PROCEDURE UNLIST Stents  HX  SECTION    
 HX ORTHOPAEDIC    
 HX OTHER SURGICAL    
 cyst removed from back Family History: 
Family History Problem Relation Age of Onset  Heart Disease Mother  Heart Disease Father  Heart Disease Sister  Breast Cancer Maternal Grandmother  Breast Cancer Paternal Grandmother Social History: 
Social History Substance Use Topics  Smoking status: Current Every Day Smoker Packs/day: 0.25 Types: Cigarettes  Smokeless tobacco: Never Used Comment: Patient states \"I  aint smoking no more d*mn cigarettes after yesterday\" 2018  Alcohol use No  
 
 
Allergies: 
No Known Allergies Review of Systems Review of Systems Constitutional: Negative for chills and fever. HENT: Positive for congestion and postnasal drip. Negative for ear pain, rhinorrhea, sore throat and trouble swallowing. Eyes: Negative for visual disturbance. Respiratory: Positive for cough and shortness of breath. Negative for chest tightness. Cardiovascular: Positive for leg swelling. Negative for chest pain and palpitations. Gastrointestinal: Negative for abdominal pain, blood in stool, constipation, diarrhea, nausea and vomiting. Genitourinary: Negative for decreased urine volume, difficulty urinating, dysuria and frequency. Musculoskeletal: Negative for back pain and neck pain. Skin: Negative for color change and rash. Neurological: Negative for dizziness, weakness, light-headedness and headaches. Physical Exam  
Physical Exam  
Constitutional: She is oriented to person, place, and time. Vital signs are normal. She appears well-developed. She does not appear ill. No distress. Morbidly obese HENT:  
Mouth/Throat: Oropharynx is clear and moist.  
Eyes: Conjunctivae are normal.  
Neck: Neck supple. Cardiovascular: Normal rate and regular rhythm. Pulmonary/Chest: Effort normal. No accessory muscle usage. No respiratory distress. She has decreased breath sounds. Abdominal: Soft. She exhibits no distension. There is no tenderness. Musculoskeletal: She exhibits edema (left > right). Lymphadenopathy:  
  She has no cervical adenopathy. Neurological: She is alert and oriented to person, place, and time. She has normal strength. No cranial nerve deficit or sensory deficit. Skin: Skin is warm and dry. Nursing note and vitals reviewed. Diagnostic Study Results Labs - Recent Results (from the past 12 hour(s)) CBC W/O DIFF Collection Time: 09/24/18  8:57 PM  
Result Value Ref Range WBC 8.3 3.6 - 11.0 K/uL  
 RBC 4.15 3.80 - 5.20 M/uL  
 HGB 11.9 11.5 - 16.0 g/dL HCT 41.1 35.0 - 47.0 % MCV 99.0 80.0 - 99.0 FL  
 MCH 28.7 26.0 - 34.0 PG  
 MCHC 29.0 (L) 30.0 - 36.5 g/dL  
 RDW 12.6 11.5 - 14.5 % PLATELET 191 502 - 555 K/uL MPV 10.9 8.9 - 12.9 FL  
 NRBC 0.0 0  WBC ABSOLUTE NRBC 0.00 0.00 - 0.01 K/uL METABOLIC PANEL, BASIC Collection Time: 09/24/18  8:57 PM  
Result Value Ref Range Sodium 139 136 - 145 mmol/L Potassium 4.1 3.5 - 5.1 mmol/L Chloride 101 97 - 108 mmol/L  
 CO2 37 (H) 21 - 32 mmol/L Anion gap 1 (L) 5 - 15 mmol/L Glucose 131 (H) 65 - 100 mg/dL BUN 12 6 - 20 MG/DL Creatinine 0.65 0.55 - 1.02 MG/DL  
 BUN/Creatinine ratio 18 12 - 20 GFR est AA >60 >60 ml/min/1.73m2 GFR est non-AA >60 >60 ml/min/1.73m2 Calcium 10.5 (H) 8.5 - 10.1 MG/DL  
NT-PRO BNP Collection Time: 09/24/18  8:57 PM  
Result Value Ref Range NT pro- (H) 0 - 125 PG/ML  
TROPONIN I Collection Time: 09/24/18  8:57 PM  
Result Value Ref Range Troponin-I, Qt. <0.05 <0.05 ng/mL BLOOD GAS, ARTERIAL Collection Time: 09/24/18  8:57 PM  
Result Value Ref Range pH 7.32 (L) 7.35 - 7.45    
 PCO2 79 (H) 35.0 - 45.0 mmHg PO2 90 80 - 100 mmHg O2 SAT 96 92 - 97 % BICARBONATE 40 (H) 22 - 26 mmol/L  
 BASE EXCESS 9.7 mmol/L  
 O2 METHOD NASAL O2    
 O2 FLOW RATE 6.00 L/min SPONTANEOUS RATE 22.0 Sample source ARTERIAL    
 SITE LEFT RADIAL PARUL'S TEST YES Radiologic Studies -  
XR CHEST PA LAT Final Result CT Results  (Last 48 hours) None CXR Results  (Last 48 hours) 09/24/18 2100  XR CHEST PA LAT Final result Impression:  IMPRESSION: Right lower lobe pneumonia and right hilar enlargement. Follow-up to  
resolution recommended. Narrative:  EXAM:  XR CHEST PA LAT INDICATION:   dyspnea COMPARISON: 8/19/2018. FINDINGS: PA and lateral radiographs of the chest demonstrate a patchy right  
lower lobe infiltrate. There is right hilar enlargement. . The cardiac and  
mediastinal contours and pulmonary vascularity are stable. The bones and soft  
tissues are within normal limits. Medical Decision Making I am the first provider for this patient. I reviewed the vital signs, available nursing notes, past medical history, past surgical history, family history and social history. Vital Signs-Reviewed the patient's vital signs. Patient Vitals for the past 12 hrs: 
 Temp Pulse Resp BP SpO2  
09/24/18 2134 - 79 23 - 100 % 09/24/18 2103 - 85 17 - 93 % 09/24/18 2006 98.2 °F (36.8 °C) 82 24 109/88 92 % Pulse Oximetry Analysis - 92% on 6L NC O2 Cardiac Monitor:  
Rate: 82 bpm 
Rhythm: Normal Sinus Rhythm EKG interpretation: (Preliminary) 1956 Rhythm: normal sinus rhythm; and regular .  Rate (approx.): 79 bpm; Axis: normal; OH interval: normal; QRS interval: normal ; ST/T wave: normal. 
Written by America Nowak ED Scribe, as dictated by Gil Simeon MD. Records Reviewed: Nursing Notes and Old Medical Records Provider Notes (Medical Decision Making):  
New pneumonia on x-ray. Hospitalized within past 90 days. Not septic. Not hypoxic on home oxygen. Slightly more hypercarbic than normal, but she is compensating, and is chronic. No evidence of CHF or pulmonary edema. ED Course:  
Initial assessment performed. The patients presenting problems have been discussed, and they are in agreement with the care plan formulated and outlined with them. I have encouraged them to ask questions as they arise throughout their visit. CONSULT NOTE:  
10:34 Jose Langford MD spoke with Dr. Marcello Tran, Specialty: Hospitalist 
Discussed pt's hx, disposition, and available diagnostic and imaging results. Reviewed care plans. Consultant will evaluate pt for admission. Written by America Nowak ED Scribe, as dictated by Gil Simeon MD. Disposition: PLAN: Admit to Hospitalist 
 
10:34 PM 
Patient is being admitted to the hospital by Dr. Marcello Tran. The results of their tests and reasons for their admission have been discussed with them and/or available family. They convey agreement and understanding for the need to be admitted and for their admission diagnosis. Written by SHRUTHI Baconibe, as dictated by Gil Simeon MD. Diagnosis Clinical Impression: 1. Hospital-acquired pneumonia Attestations: This note is prepared by America Nowak acting as scribe for MD Gil Leal MD : The scribe's documentation has been prepared under my direction and personally reviewed by me in its entirety. I confirm that the note above accurately reflects all work, treatment, procedures, and medical decision making performed by me. This note will not be viewable in [address]

## 2018-09-25 NOTE — PROGRESS NOTES
Pharmacy  Heparin Monitoring Indication: DVT prophylaxis Dose ordered: 5000 units subcutaneously every 8hr 
Weight: 182.8 kg (BMI 63.12) CrCl:  >100 ml/min Impression/Plan:  
? Change to heparin 7500 units subcutaneously every 8hr per protocol for obese patients with BMI >40 Thanks, Sajan Trevino, PHARMD

## 2018-09-25 NOTE — PROGRESS NOTES
Hospitalist Progress Note NAME: Katherine Hernandez :  1970 MRN:  [id number] Assessment / Plan: 
 
Acute on chronic hypercarbic respiratory failure, multifactorial 
Acute on chronic diastolic heart failure Acute on chronic COPD exacerbation Right lower lobe pneumonia CXR with RLL PNA 
: sputum culture ordered Slowly improving. continue Cefepime, Levaquin, systemic steroids, mucolytics, nebulizer treatments and Acapella Obstructive sleep apnea/obesity hypoventilation syndrome, on trilogy at home BIPAP 16/8 at bedtime Hypophosphatemia Replete Recent dx Breast CA She was recently diagnosed with breast cancer and is in the process of deciding what therapy to proceed with. Diabetes Diabetes not controlled due to IV solumedrol. continue home lantus, sliding scale insulin and diabetic diet Hypertension Bp is controlled Hyperlipidemia 
continue her statin Obesity, super morbid 
outpatient weight loss program BMI 66 
 
 
40 or above Morbid obesity / Body mass index is 66.43 kg/(m^2). Code status: Full Prophylaxis: Hep SQ Recommended Disposition: Home w/Family Subjective: Chief Complaint / Reason for Physician Visit \"patient reports EDISON on minimal exertion. Dry cough. No nausea. No chest pain. No diarrhea\". Discussed with RN events overnight. Review of Systems: 
Symptom Y/N Comments  Symptom Y/N Comments Fever/Chills n   Chest Pain n   
Poor Appetite    Edema y Cough y   Abdominal Pain n   
Sputum    Joint Pain SOB/RAZO y   Pruritis/Rash Nausea/vomit n   Tolerating PT/OT Diarrhea n   Tolerating Diet y Constipation    Other Could NOT obtain due to:   
 
Objective: VITALS:  
Last 24hrs VS reviewed since prior progress note. Most recent are: 
Patient Vitals for the past 24 hrs: 
 Temp Pulse Resp BP SpO2  
18 1141 98 °F (36.7 °C) 91 22 104/45 90 % 18 1112 - - - - 91 % 09/25/18 0825 98.1 °F (36.7 °C) 100 18 118/73 91 %  
09/25/18 0801 - - - - 96 %  
09/25/18 0339 - - - - 95 % 09/25/18 0132 97.4 °F (36.3 °C) 78 18 138/72 95 % 09/25/18 0104 98.3 °F (36.8 °C) 80 22 118/89 100 % 09/24/18 2134 - 79 23 - 100 % 09/24/18 2103 - 85 17 - 93 % 09/24/18 2006 98.2 °F (36.8 °C) 82 24 109/88 92 % Intake/Output Summary (Last 24 hours) at 09/25/18 1435 Last data filed at 09/25/18 1141 Gross per 24 hour Intake              240 ml Output             3100 ml Net            -2860 ml PHYSICAL EXAM: 
General: Alert, cooperative, no acute distress. Very obese EENT:  Anicteric sclerae. Resp:  CTA bilaterally, no wheezing or rales. No accessory muscle use CV:  Regular  rhythm,  1+ LE edema GI:  Soft, Non distended, Non tender.  +Bowel sounds Neurologic:  Alert and oriented X 3, normal speech, Psych:   Good insight. Not anxious nor agitated Skin:  Bilateral leg hyperkeratosis with chronic venous stasis changes Reviewed most current lab test results and cultures  YES Reviewed most current radiology test results   YES Review and summation of old records today    NO Reviewed patient's current orders and MAR    YES 
PMH/ reviewed - no change compared to H&P 
________________________________________________________________________ 
________________________________________________________________________ Cris Nunez MD  
 
Procedures: see electronic medical records for all procedures/Xrays and details which were not copied into this note but were reviewed prior to creation of Plan. LABS: 
I reviewed today's most current labs and imaging studies. Pertinent labs include: 
Recent Labs  
   09/24/18 2057 WBC  8.3 HGB  11.9 HCT  41.1 PLT  162 Recent Labs  
   09/25/18 0226 09/24/18 2057 NA  139  139  
K  4.0  4.1 CL  99  101 CO2  34*  37* GLU  323*  131* BUN  13  12 CREA  0.92  0.65 CA  PENDING  10.5*  10.5*  
 MG  1.9   --   
PHOS  1.5*   --   
ALB  3.1*   --   
TBILI  0.3   --   
SGOT  12*   --   
ALT  18   --   
 
 
Signed: Bard Iliana MD

## 2018-09-25 NOTE — PROGRESS NOTES
Problem: Pressure Injury - Risk of 
Goal: *Prevention of pressure injury Document Jeffrey Scale and appropriate interventions in the flowsheet. Outcome: Progressing Towards Goal 
Pressure Injury Interventions: 
Sensory Interventions: Assess changes in LOC, Assess need for specialty bed, Avoid rigorous massage over bony prominences, Chair cushion, Check visual cues for pain, Discuss PT/OT consult with provider, Float heels, Keep linens dry and wrinkle-free Activity Interventions: Assess need for specialty bed, Chair cushion, Increase time out of bed, Pressure redistribution bed/mattress(bed type), PT/OT evaluation Mobility Interventions: Assess need for specialty bed, Chair cushion, Float heels, HOB 30 degrees or less, Pressure redistribution bed/mattress (bed type), PT/OT evaluation, Turn and reposition approx. every two hours(pillow and wedges) Nutrition Interventions: Document food/fluid/supplement intake, Discuss nutritional consult with provider, Offer support with meals,snacks and hydration Friction and Shear Interventions: Apply protective barrier, creams and emollients, Feet elevated on foot rest, Foam dressings/transparent film/skin sealants, HOB 30 degrees or less, Lift sheet, Lift team/patient mobility team, Minimize layers, Sit at 90-degree angle Problem: Falls - Risk of 
Goal: *Absence of Falls Document Shama Penaloza Fall Risk and appropriate interventions in the flowsheet. Fall Risk Interventions: 
Mobility Interventions: Bed/chair exit alarm, Communicate number of staff needed for ambulation/transfer, Mechanical lift, OT consult for ADLs, Patient to call before getting OOB, PT Consult for mobility concerns, PT Consult for assist device competence Medication Interventions: Bed/chair exit alarm, Evaluate medications/consider consulting pharmacy, Patient to call before getting OOB, Teach patient to arise slowly Elimination Interventions: Bed/chair exit alarm, Call light in reach, Elevated toilet seat, Patient to call for help with toileting needs, Toilet paper/wipes in reach, Toileting schedule/hourly rounds

## 2018-09-25 NOTE — PROGRESS NOTES
0700: Received bedside report receive from [facility], off going nurse. Assumed care of patient. 1500: Patient given flu vaccination flyer, explained why we want to give vaccine early prior to discharge, patient does not want flu shot today, agrees to get it before discharge. States Mayra Hearn will see how she feels tomorrow\". Charge nurse notified. 1900: Bedside shift change report given to , RN (oncoming nurse). Report included the following information SBAR, Kardex, Intake/Output, MAR, Recent Results and Cardiac Rhythm NSR.  
 
SHIFT SUMMARY: 
 
 
 
 
 
[address] NURSING NOTE Admission Date 9/24/2018 Admission Diagnosis PNA (pneumonia) Consults None Cardiac Monitoring [x] Yes [] No  
  
Purposeful Hourly Rounding [x] Yes   
Yu Score Total Score: 3 Yu score 3 or > [x] Bed Alarm [] Avasys [] 1:1 sitter [] Patient refused (Signed refusal form in chart) Jeffrey Score Jeffrey Score: 18 Jeffrey score 14 or < [] PMT consult [] Wound Care consult  
 []  Specialty bed  [] Nutrition consult Influenza Vaccine Received Flu Vaccine for Current Season (usually Sept-March): No  
 Patient/Guardian Refused (Notify MD): No  
  
Oxygen needs? [] Room air Oxygen @  []1L    []2L    []3L   []4L    []5L   [x]6L via NC Chronic home O2 use? [x] Yes [] No 
Perform O2 challenge test and document in progress note using smartphrase (.Homeoxygen) Last bowel movement Last Bowel Movement Date: 09/24/18 Urinary Catheter LDAs Peripheral IV 09/24/18 Left Antecubital (Active) Site Assessment Clean, dry, & intact 9/25/2018  3:32 PM  
Phlebitis Assessment 0 9/25/2018  3:32 PM  
Infiltration Assessment 0 9/25/2018  3:32 PM  
Dressing Status Clean, dry, & intact 9/25/2018  3:32 PM  
Dressing Type Transparent 9/25/2018  3:32 PM  
Hub Color/Line Status Pink;Flushed;Patent 9/25/2018  3:32 PM  
                  
  
Readmission Risk Assessment Tool Score High Risk 27 Total Score 3 Has Seen PCP in Last 6 Months (Yes=3, No=0)  
 11 IP Visits Last 12 Months (1-3=4, 4=9, >4=11) 9 Pt. Coverage (Medicare=5 , Medicaid, or Self-Pay=4) 7 Charlson Comorbidity Score (Age + Comorbid Conditions) Criteria that do not apply:  
 . Living with Significant Other. Assisted Living. LTAC. SNF. or  
Rehab Patient Length of Stay (>5 days = 3) Expected Length of Stay 3d 16h Actual Length of Stay 1

## 2018-09-25 NOTE — ED NOTES
TRANSFER - OUT REPORT: 
 
Verbal report given to Odalys Rodriguez RN on Jaun Aguilar  being transferred to [facility] for routine progression of care Report consisted of patients Situation, Background, Assessment and  
Recommendations(SBAR). Information from the following report(s) SBAR was reviewed with the receiving nurse. Lines:  
Peripheral IV 09/24/18 Left Antecubital (Active) Site Assessment Clean, dry, & intact 9/24/2018 10:20 PM  
  
 
Opportunity for questions and clarification was provided. Patient transported with: 
 Registered Nurse

## 2018-09-25 NOTE — H&P
Hospitalist Admission NoteNAME: Shavonne Naranjo :  1970 MRN:  [id number] Date/Time:  2018 12:56 AM 
 
Patient PCP: Raghu Montero NP 
______________________________________________________________________ Given the patient's current clinical presentation, I have a high level of concern for decompensation if discharged from the emergency department. Complex decision making was performed, which includes reviewing the patient's available past medical records, laboratory results, and x-ray films. My assessment of this patient's clinical condition and my plan of care is as follows. Assessment / Plan: 
 
Acute on chronic hypoxemic and hypercarbic respiratory failure, multifactorial 
Acute on chronic diastolic heart failure Acute on chronic COPD exacerbation Suspected right lower lobe pneumonia, seen on chest x-ray Obstructive sleep apnea/obesity hypoventilation syndrome, on trilogy at home 
-We will continue the antibiotics started in the ER with cefepime and Levaquin and attempt to get sputum cultures for further evaluation. 
-We will start her on IV Lasix for diuresis and monitor her weight and consider cardiology evaluation if needed 
-We will continue IV steroids, mucolytics, nebulizer treatments and an Acapella to help with pulmonary toilet for his COPD exacerbation 
-She did not bring in her trilogy so we will have our respiratory therapist place her on BiPAP at night she is now to prevent worsening hypercarbia which is currently somewhat more than her baseline 
-She says she no longer has a pulmonologist as an outpatient, so may benefit from referral to pulmonary prior to discharge Hypercalcemia Recent dx B CA 
-we will check an ionized calcium and her phosphorus, as well as a PTH. She was recently diagnosed with breast cancer and is in the process of deciding what therapy to proceed with.  
 
Diabetes-we will place her on sliding scale and continue her oral glipizide Hypertension-we will continue her beta-blocker and adjust her meds as needed Hyperlipidemia-we will continue her statin Obesity, super morbid-encouraged weight loss, calculate BMI 
 
CODE STATUS-patient does not have a living will she would want his sister-in-law [facility] series to make decisions for her or her son Amaury Palomo in the event that she cannot make decisions for herself and currently would like to be a full code. DVT prophylaxis-will be on heparin subcu and mobilize as tolerated we will ask her physical therapy evaluation Baseline: independent, help from family Subjective: CHIEF COMPLAINT: Shortness of breath and dry cough HISTORY OF PRESENT ILLNESS:    
Tom Douglas is a 57 y.o. woman was hospitalized back in late August with the leg cellulitis, acute on chronic hypercarbic respiratory failure with COPD exacerbation and congestive heart failure. She reports that she has been doing okay since her discharge until about a week ago when she started feeling like she was filling up with fluid. This past week she feels like she has gained a bunch of weight and her legs are much heavier. She gets very short of breath with any kind of activity. She has orthopnea and PND and has been sleeping in a recliner and despite this now she can even sleep because of progressive shortness of breath. She has had a dry cough that feels like mucus cannot come up she has not been around anybody that has been sick she denies any fevers or chills. She has been eating and drinking okay no nausea vomiting or abdominal pain. She does intermittently feel some pressure in her chest none currently. She can take it anymore and came into the ER for further evaluation and management. In the ER patient was found to have of right lower lobe infiltrate on chest x-ray, her white count was normal, she has elevated calcium at 10.5, troponin was negative BNP was 262.  ABG showed a pH of 7.32 with a PCO2 of 79 on her 6 L that she normally uses at home. In the ER she received cefepime and Levaquin for hospital associated pneumonia he got a DuoNeb and Solu-Medrol and we were asked to admit for further evaluation and management. Review of systems complete review of systems was done except as noted above in H&P was negative. We were asked to admit for work up and evaluation of the above problems. Past Medical History:  
Diagnosis Date  Arthritis  Asthma  Breast lump  CAD (coronary artery disease)  Congestive heart failure ([facility] [address]  COPD (chronic obstructive pulmonary disease) ([facility])  Diabetes ([facility]  Hypertension  Morbid obesity with BMI of 70 and over, adult ([facility]  NSTEMI (non-ST elevated myocardial infarction) ([facility]  SVT (supraventricular tachycardia) ([facility]) Past Surgical History:  
Procedure Laterality Date  CARDIAC SURG PROCEDURE UNLIST Stents  HX  SECTION    
 HX ORTHOPAEDIC    
 HX OTHER SURGICAL    
 cyst removed from back Social History Substance Use Topics  Smoking status: Current Every Day Smoker Packs/day: 0.25 Types: Cigarettes  Smokeless tobacco: Never Used Comment: Patient states \"I  aint smoking no more d*mn cigarettes after yesterday\" 2018  Alcohol use No  
  
 
Family History Problem Relation Age of Onset  Heart Disease Mother  Heart Disease Father  Heart Disease Sister  Breast Cancer Maternal Grandmother  Breast Cancer Paternal Grandmother Social history patient lives with her nephews who help take care of her she is single she quit smoking in January denies alcohol or drug use Family history her mother  in March of a heart attack in her 76s her father  with a heart attack in his 62s No Known Allergies Prior to Admission medications Medication Sig Start Date End Date Taking? Authorizing Provider ketoconazole (NIZORAL) 2 % topical cream Apply  to affected area daily. 8/23/18   Astrid Almazan NP  
nystatin (MYCOSTATIN) powder Apply  to affected area two (2) times a day. 8/23/18   Astrid Almazan NP  
guaiFENesin ER (MUCINEX) 600 mg ER tablet Take 1 Tab by mouth two (2) times a day. 8/23/18   Astrid Almazan NP  
lidocaine (LIDODERM) 5 % 2 Patches by TransDERmal route every twenty-four (24) hours. Apply patch to the affected area for 12 hours a day and remove for 12 hours a day. 8/23/18   Astrid Almazan NP  
oxyCODONE IR (ROXICODONE) 5 mg immediate release tablet Take 1 Tab by mouth every four (4) hours as needed (pain 6-10). Max Daily Amount: 30 mg. 8/23/18   Astrid Almazan NP  
polyethylene glycol (MIRALAX) 17 gram packet Take 1 Packet by mouth daily. 8/24/18   Astrid Almazan NP  
glucose blood VI test strips (EASYGLUCO TEST) strip by Does Not Apply route ACB/HS. 8/23/18   Astrid Almazan NP  
albuterol (VENTOLIN HFA) 90 mcg/actuation inhaler Take 2 Puffs by inhalation every six (6) hours as needed for Wheezing. Historical Provider  
cyclobenzaprine (FLEXERIL) 5 mg tablet Take 5 mg by mouth two (2) times a day. flexeriol    Historical Provider  
metoprolol succinate (TOPROL-XL) 25 mg XL tablet Take  by mouth two (2) times a day. Landon Raza MD  
furosemide (LASIX) 40 mg tablet Take 40 mg by mouth two (2) times a day. Indications: Peripheral Edema due to Chronic Heart Failure    Landon Raza MD  
albuterol (PROVENTIL VENTOLIN) 2.5 mg /3 mL (0.083 %) nebulizer solution 3 mL by Nebulization route every four (4) hours as needed for Wheezing. 2/3/18   Daniela Littlejohn MD  
aspirin 81 mg chewable tablet Take 81 mg by mouth daily. Historical Provider  
insulin glargine (LANTUS) 100 unit/mL injection 15 Units by SubCUTAneous route daily. Historical Provider  
hydrOXYzine pamoate (VISTARIL) 50 mg capsule Take 50 mg by mouth every eight (8) hours as needed for Itching.  Indications: Pruritus of Skin Historical Provider  
fluticasone (FLONASE) 50 mcg/actuation nasal spray 2 Sprays by Both Nostrils route daily. 9/9/17   Nesha Gallardo MD  
lovastatin (MEVACOR) 40 mg tablet Take 1 Tab by mouth nightly. 1/14/16   Nael Riley NP  
glyBURIDE (DIABETA) 5 mg tablet Take 5 mg by mouth Daily (before breakfast). Historical Provider  
cetirizine (ZYRTEC) 10 mg tablet Take 10 mg by mouth daily as needed for Allergies. Historical Provider  
ammonium lactate (LAC-HYDRIN) 12 % topical cream rub in to affected area well 11/21/14   Marianela Medina MD  
nitroglycerin (NITROSTAT) 0.4 mg SL tablet 1 Tab by SubLINGual route every five (5) minutes as needed for Chest Pain (call 911 if not relieved by 3). 9/12/13   Nael Riley NP  
 
 
REVIEW OF SYSTEMS:   See above Objective: VITALS:   
Visit Vitals  /88  Pulse 79  Temp 98.2 °F (36.8 °C)  Resp 23  
 Ht 5' 7\" (1.702 m)  Wt (!) 182.8 kg (403 lb)  SpO2 100%  BMI 63.12 kg/m2 PHYSICAL EXAM: 
 
Patient is awake and alert oriented ×3 on nasal cannula sitting in a chair in the ER does not appear toxic is oriented ×3. Extraocular movements are intact sclera nonicteric conjunctiva slightly pale oropharynx mucous membranes tacky no thrush or lesions her neck is obese no obvious thyromegaly or nodules are appreciated no obvious carotid bruits or JVD. Cardiovascular regular rate distant heart sounds no obvious murmurs rubs or gallops lungs decreased breath sounds throughout no wheezes rhonchi or crackles abdomen is obese bowel sounds present soft nontender no obvious hepatosplenomegaly is appreciated no bladder distention or tenderness able to be appreciated. Remedies patient has verrucous appearing lymphedema greater on the left than on the right no obvious erythema or warmth bilaterally to suggest cellulitis her neuro exam is nonfocal 
 
_______________________________________________________________________ Care Plan discussed with: 
 Comments Patient y Family RN y   
Care Manager Consultant:     
_______________________________________________________________________ Expected  Disposition:  
Home with Family HH/PT/OT/RN   
SNF/LTC   
LUIS A   
________________________________________________________________________ TOTAL TIME:   Minutes Critical Care Provided     Minutes non procedure based Comments Reviewed previous records  
>50% of visit spent in counseling and coordination of care  Discussion with patient and/or family and questions answered 
  
 
________________________________________________________________________ Signed: Jorge York MD 
 
Procedures: see electronic medical records for all procedures/Xrays and details which were not copied into this note but were reviewed prior to creation of Plan. LAB DATA REVIEWED:   
Recent Results (from the past 24 hour(s)) EKG, 12 LEAD, INITIAL Collection Time: 09/24/18  7:56 PM  
Result Value Ref Range Ventricular Rate 79 BPM  
 Atrial Rate 79 BPM  
 P-R Interval 198 ms QRS Duration 84 ms Q-T Interval 358 ms QTC Calculation (Bezet) 410 ms Calculated P Axis 40 degrees Calculated R Axis 60 degrees Calculated T Axis 40 degrees Diagnosis Normal sinus rhythm Low voltage QRS When compared with ECG of 19-AUG-2018 00:43, No significant change was found CBC W/O DIFF Collection Time: 09/24/18  8:57 PM  
Result Value Ref Range WBC 8.3 3.6 - 11.0 K/uL  
 RBC 4.15 3.80 - 5.20 M/uL  
 HGB 11.9 11.5 - 16.0 g/dL HCT 41.1 35.0 - 47.0 % MCV 99.0 80.0 - 99.0 FL  
 MCH 28.7 26.0 - 34.0 PG  
 MCHC 29.0 (L) 30.0 - 36.5 g/dL  
 RDW 12.6 11.5 - 14.5 % PLATELET 810 344 - 327 K/uL MPV 10.9 8.9 - 12.9 FL  
 NRBC 0.0 0  WBC ABSOLUTE NRBC 0.00 0.00 - 0.01 K/uL METABOLIC PANEL, BASIC Collection Time: 09/24/18  8:57 PM  
Result Value Ref Range  Sodium 139 136 - 145 mmol/L  
 Potassium 4.1 3.5 - 5.1 mmol/L Chloride 101 97 - 108 mmol/L  
 CO2 37 (H) 21 - 32 mmol/L Anion gap 1 (L) 5 - 15 mmol/L Glucose 131 (H) 65 - 100 mg/dL BUN 12 6 - 20 MG/DL Creatinine 0.65 0.55 - 1.02 MG/DL  
 BUN/Creatinine ratio 18 12 - 20 GFR est AA >60 >60 ml/min/1.73m2 GFR est non-AA >60 >60 ml/min/1.73m2 Calcium 10.5 (H) 8.5 - 10.1 MG/DL  
NT-PRO BNP Collection Time: 09/24/18  8:57 PM  
Result Value Ref Range NT pro- (H) 0 - 125 PG/ML  
TROPONIN I Collection Time: 09/24/18  8:57 PM  
Result Value Ref Range Troponin-I, Qt. <0.05 <0.05 ng/mL BLOOD GAS, ARTERIAL Collection Time: 09/24/18  8:57 PM  
Result Value Ref Range pH 7.32 (L) 7.35 - 7.45    
 PCO2 79 (H) 35.0 - 45.0 mmHg PO2 90 80 - 100 mmHg O2 SAT 96 92 - 97 % BICARBONATE 40 (H) 22 - 26 mmol/L  
 BASE EXCESS 9.7 mmol/L  
 O2 METHOD NASAL O2    
 O2 FLOW RATE 6.00 L/min SPONTANEOUS RATE 22.0 Sample source ARTERIAL    
 SITE LEFT RADIAL  PARUL'S TEST YES

## 2018-09-26 LAB
ANION GAP SERPL CALC-SCNC: 4 MMOL/L (ref 5–15)
ATRIAL RATE: 79 BPM
BUN SERPL-MCNC: 17 MG/DL (ref 6–20)
BUN/CREAT SERPL: 20 (ref 12–20)
CALCIUM SERPL-MCNC: 10.4 MG/DL (ref 8.5–10.1)
CALCULATED P AXIS, ECG09: 40 DEGREES
CALCULATED R AXIS, ECG10: 60 DEGREES
CALCULATED T AXIS, ECG11: 40 DEGREES
CHLORIDE SERPL-SCNC: 98 MMOL/L (ref 97–108)
CO2 SERPL-SCNC: 35 MMOL/L (ref 21–32)
CREAT SERPL-MCNC: 0.84 MG/DL (ref 0.55–1.02)
DIAGNOSIS, 93000: NORMAL
GLUCOSE BLD STRIP.AUTO-MCNC: 229 MG/DL (ref 65–100)
GLUCOSE BLD STRIP.AUTO-MCNC: 282 MG/DL (ref 65–100)
GLUCOSE BLD STRIP.AUTO-MCNC: 338 MG/DL (ref 65–100)
GLUCOSE BLD STRIP.AUTO-MCNC: 400 MG/DL (ref 65–100)
GLUCOSE SERPL-MCNC: 278 MG/DL (ref 65–100)
P-R INTERVAL, ECG05: 198 MS
PHOSPHATE SERPL-MCNC: 1.4 MG/DL (ref 2.6–4.7)
POTASSIUM SERPL-SCNC: 4.3 MMOL/L (ref 3.5–5.1)
Q-T INTERVAL, ECG07: 358 MS
QRS DURATION, ECG06: 84 MS
QTC CALCULATION (BEZET), ECG08: 410 MS
SERVICE CMNT-IMP: ABNORMAL
SODIUM SERPL-SCNC: 137 MMOL/L (ref 136–145)
VENTRICULAR RATE, ECG03: 79 BPM

## 2018-09-26 PROCEDURE — 84100 ASSAY OF PHOSPHORUS: CPT | Performed by: INTERNAL MEDICINE

## 2018-09-26 PROCEDURE — 74011000250 HC RX REV CODE- 250: Performed by: EMERGENCY MEDICINE

## 2018-09-26 PROCEDURE — 82962 GLUCOSE BLOOD TEST: CPT

## 2018-09-26 PROCEDURE — 74011250637 HC RX REV CODE- 250/637: Performed by: EMERGENCY MEDICINE

## 2018-09-26 PROCEDURE — 94660 CPAP INITIATION&MGMT: CPT

## 2018-09-26 PROCEDURE — 97530 THERAPEUTIC ACTIVITIES: CPT | Performed by: PHYSICAL THERAPIST

## 2018-09-26 PROCEDURE — 74011250636 HC RX REV CODE- 250/636: Performed by: EMERGENCY MEDICINE

## 2018-09-26 PROCEDURE — 94640 AIRWAY INHALATION TREATMENT: CPT

## 2018-09-26 PROCEDURE — 97161 PT EVAL LOW COMPLEX 20 MIN: CPT | Performed by: PHYSICAL THERAPIST

## 2018-09-26 PROCEDURE — 77010033678 HC OXYGEN DAILY

## 2018-09-26 PROCEDURE — 74011250636 HC RX REV CODE- 250/636: Performed by: GENERAL ACUTE CARE HOSPITAL

## 2018-09-26 PROCEDURE — 80048 BASIC METABOLIC PNL TOTAL CA: CPT | Performed by: INTERNAL MEDICINE

## 2018-09-26 PROCEDURE — 65270000029 HC RM PRIVATE

## 2018-09-26 PROCEDURE — 94760 N-INVAS EAR/PLS OXIMETRY 1: CPT

## 2018-09-26 PROCEDURE — 74011250637 HC RX REV CODE- 250/637: Performed by: INTERNAL MEDICINE

## 2018-09-26 PROCEDURE — 74011636637 HC RX REV CODE- 636/637: Performed by: INTERNAL MEDICINE

## 2018-09-26 PROCEDURE — 74011000258 HC RX REV CODE- 258: Performed by: EMERGENCY MEDICINE

## 2018-09-26 PROCEDURE — 74011636637 HC RX REV CODE- 636/637: Performed by: EMERGENCY MEDICINE

## 2018-09-26 PROCEDURE — 36415 COLL VENOUS BLD VENIPUNCTURE: CPT | Performed by: INTERNAL MEDICINE

## 2018-09-26 RX ORDER — INSULIN GLARGINE 100 [IU]/ML
15 INJECTION, SOLUTION SUBCUTANEOUS ONCE
Status: COMPLETED | OUTPATIENT
Start: 2018-09-26 | End: 2018-09-26

## 2018-09-26 RX ORDER — INSULIN GLARGINE 100 [IU]/ML
30 INJECTION, SOLUTION SUBCUTANEOUS DAILY
Status: DISCONTINUED | OUTPATIENT
Start: 2018-09-27 | End: 2018-09-27 | Stop reason: HOSPADM

## 2018-09-26 RX ORDER — IPRATROPIUM BROMIDE AND ALBUTEROL SULFATE 2.5; .5 MG/3ML; MG/3ML
3 SOLUTION RESPIRATORY (INHALATION)
Status: DISCONTINUED | OUTPATIENT
Start: 2018-09-26 | End: 2018-09-27 | Stop reason: HOSPADM

## 2018-09-26 RX ORDER — FACIAL-BODY WIPES
10 EACH TOPICAL ONCE
Status: DISPENSED | OUTPATIENT
Start: 2018-09-26 | End: 2018-09-26

## 2018-09-26 RX ORDER — AMOXICILLIN 250 MG
1 CAPSULE ORAL DAILY
Status: DISCONTINUED | OUTPATIENT
Start: 2018-09-26 | End: 2018-09-27 | Stop reason: HOSPADM

## 2018-09-26 RX ORDER — DEXTROMETHORPHAN POLISTIREX 30 MG/5 ML
SUSPENSION, EXTENDED RELEASE 12 HR ORAL
Status: DISCONTINUED | OUTPATIENT
Start: 2018-09-26 | End: 2018-09-27 | Stop reason: HOSPADM

## 2018-09-26 RX ADMIN — INSULIN LISPRO 3 UNITS: 100 INJECTION, SOLUTION INTRAVENOUS; SUBCUTANEOUS at 08:37

## 2018-09-26 RX ADMIN — INSULIN LISPRO 7 UNITS: 100 INJECTION, SOLUTION INTRAVENOUS; SUBCUTANEOUS at 17:09

## 2018-09-26 RX ADMIN — FUROSEMIDE 40 MG: 10 INJECTION, SOLUTION INTRAMUSCULAR; INTRAVENOUS at 08:38

## 2018-09-26 RX ADMIN — METOPROLOL TARTRATE 25 MG: 25 TABLET ORAL at 20:58

## 2018-09-26 RX ADMIN — Medication 10 ML: at 13:40

## 2018-09-26 RX ADMIN — MICONAZOLE NITRATE: 20 CREAM TOPICAL at 17:11

## 2018-09-26 RX ADMIN — DIBASIC SODIUM PHOSPHATE, MONOBASIC POTASSIUM PHOSPHATE AND MONOBASIC SODIUM PHOSPHATE 1 TABLET: 852; 155; 130 TABLET ORAL at 08:39

## 2018-09-26 RX ADMIN — INSULIN LISPRO 5 UNITS: 100 INJECTION, SOLUTION INTRAVENOUS; SUBCUTANEOUS at 12:00

## 2018-09-26 RX ADMIN — HEPARIN SODIUM 7500 UNITS: 5000 INJECTION, SOLUTION INTRAVENOUS; SUBCUTANEOUS at 17:08

## 2018-09-26 RX ADMIN — PRAVASTATIN SODIUM 40 MG: 10 TABLET ORAL at 21:01

## 2018-09-26 RX ADMIN — IPRATROPIUM BROMIDE AND ALBUTEROL SULFATE 3 ML: .5; 3 SOLUTION RESPIRATORY (INHALATION) at 19:59

## 2018-09-26 RX ADMIN — HEPARIN SODIUM 7500 UNITS: 5000 INJECTION, SOLUTION INTRAVENOUS; SUBCUTANEOUS at 03:34

## 2018-09-26 RX ADMIN — CEFEPIME HYDROCHLORIDE 2 G: 2 INJECTION, POWDER, FOR SOLUTION INTRAVENOUS at 23:25

## 2018-09-26 RX ADMIN — ASPIRIN 81 MG 81 MG: 81 TABLET ORAL at 08:38

## 2018-09-26 RX ADMIN — IPRATROPIUM BROMIDE AND ALBUTEROL SULFATE 3 ML: .5; 3 SOLUTION RESPIRATORY (INHALATION) at 03:15

## 2018-09-26 RX ADMIN — HEPARIN SODIUM 7500 UNITS: 5000 INJECTION, SOLUTION INTRAVENOUS; SUBCUTANEOUS at 10:00

## 2018-09-26 RX ADMIN — INSULIN LISPRO 6 UNITS: 100 INJECTION, SOLUTION INTRAVENOUS; SUBCUTANEOUS at 21:46

## 2018-09-26 RX ADMIN — IPRATROPIUM BROMIDE AND ALBUTEROL SULFATE 3 ML: .5; 3 SOLUTION RESPIRATORY (INHALATION) at 00:08

## 2018-09-26 RX ADMIN — CYCLOBENZAPRINE HYDROCHLORIDE 5 MG: 10 TABLET, FILM COATED ORAL at 08:38

## 2018-09-26 RX ADMIN — INSULIN GLARGINE 15 UNITS: 100 INJECTION, SOLUTION SUBCUTANEOUS at 08:37

## 2018-09-26 RX ADMIN — DIBASIC SODIUM PHOSPHATE, MONOBASIC POTASSIUM PHOSPHATE AND MONOBASIC SODIUM PHOSPHATE 1 TABLET: 852; 155; 130 TABLET ORAL at 17:08

## 2018-09-26 RX ADMIN — INSULIN GLARGINE 15 UNITS: 100 INJECTION, SOLUTION SUBCUTANEOUS at 10:00

## 2018-09-26 RX ADMIN — METOPROLOL TARTRATE 25 MG: 25 TABLET ORAL at 08:38

## 2018-09-26 RX ADMIN — POLYETHYLENE GLYCOL 3350 17 G: 17 POWDER, FOR SOLUTION ORAL at 08:38

## 2018-09-26 RX ADMIN — CEFEPIME HYDROCHLORIDE 2 G: 2 INJECTION, POWDER, FOR SOLUTION INTRAVENOUS at 06:07

## 2018-09-26 RX ADMIN — IPRATROPIUM BROMIDE AND ALBUTEROL SULFATE 3 ML: .5; 3 SOLUTION RESPIRATORY (INHALATION) at 07:53

## 2018-09-26 RX ADMIN — Medication 10 ML: at 20:58

## 2018-09-26 RX ADMIN — CYCLOBENZAPRINE HYDROCHLORIDE 5 MG: 10 TABLET, FILM COATED ORAL at 20:59

## 2018-09-26 RX ADMIN — Medication 10 ML: at 06:08

## 2018-09-26 RX ADMIN — CEFEPIME HYDROCHLORIDE 2 G: 2 INJECTION, POWDER, FOR SOLUTION INTRAVENOUS at 13:37

## 2018-09-26 RX ADMIN — PREDNISONE 40 MG: 20 TABLET ORAL at 08:39

## 2018-09-26 RX ADMIN — GUAIFENESIN 600 MG: 600 TABLET, EXTENDED RELEASE ORAL at 17:08

## 2018-09-26 RX ADMIN — GUAIFENESIN 600 MG: 600 TABLET, EXTENDED RELEASE ORAL at 08:38

## 2018-09-26 NOTE — PROGRESS NOTES
ADULT PROTOCOL: JET AEROSOL  REASSESSMENT Patient  Dc Thornton     57 y.o.   female     9/26/2018  10:23 AM 
 
Breath Sounds Pre Procedure: Right Breath Sounds: Diminished Left Breath Sounds: Diminished Breath Sounds Post Procedure: Right Breath Sounds: Diminished Left Breath Sounds: Diminished Breathing pattern: Pre procedure Breathing Pattern: Regular Post procedure Breathing Pattern: Regular Heart Rate: Pre procedure Pulse: 80 
         Post procedure Pulse: 89 Resp Rate: Pre procedure Respirations: 20 
         Post procedure Respirations: 20 
 
     
 
Cough: Pre procedure Cough: Non-productive Post procedure Cough: Non-productive Oxygen: O2 Device: Nasal cannula   Flow rate (L/min) 5 lpm 
   Changed: NO/ Home O2 SpO2: Pre procedure SpO2: 92 %   with oxygen Post procedure SpO2: 92 %  with oxygen Nebulizer Therapy: Current medications Aerosolized Medications: DuoNeb Changed: YES/ Changed to Q6/ complains of nausea with txs. Smoking History: current smoker Problem List:  
Patient Active Problem List  
Diagnosis Code  CHF (congestive heart failure) ([facility]) I50.9  Malignant essential hypertension I10  
 Asthma J45.909  Obesity hypoventilation syndrome ([facility]) E66.2  Dyspnea R06.00  Chest pressure R07.89  
 Acute on chronic diastolic heart failure ([facility]) I50.33  
 SVT (supraventricular tachycardia) ([facility]) I47.1  NSTEMI (non-ST elevated myocardial infarction) ([facility]) I21.4  
 S/P PTCA (percutaneous transluminal coronary angioplasty) Z98.61  
 Coronary atherosclerosis of native coronary artery I25.10  Hypoxia R09.02  
 Noncompliance with therapeutic plan Z91.11  
 Chest pain R07.9  GERD (gastroesophageal reflux disease) K21.9  Acute respiratory failure with hypoxia and hypercarbia ([facility]) J96.01, J96.02  
  COPD (chronic obstructive pulmonary disease) ([facility]) J44.9  Tobacco abuse Z72.0  Obesity, morbid (more than 100 lbs over ideal weight or BMI > 40) ([facility]) E66.01  
 Cellulitis L03.90  
 SIRS due to infectious process with acute organ dysfunction ([facility]) A41.9, R65.20  Sepsis associated hypotension ([facility]) A41.9  Gangrenous toe ([facility] [address] [id number]  Type II diabetes mellitus, uncontrolled ([facility]) E11.65  
 HTN (hypertension) N38  
 Diastolic CHF, chronic ([facility]) I50.32  
 Cough with hemoptysis R04.2  Lymphedema of both lower extremities I89.0  
 CAP (community acquired pneumonia) J18.9  Cellulitis, leg L03.119  
 COPD exacerbation ([facility]) J44.1  Maggot infestation B87.9  
 SOB (shortness of breath) R06.02  Shortness of breath R06.02  
 Multifocal pneumonia J18.9  Acute respiratory failure ([facility]) J96.00  
 Acute on chronic respiratory failure with hypoxia and hypercapnia ([facility]) J96.21, J96.22  
 NSVT (nonsustained ventricular tachycardia) ([facility]) I47.2  Respiratory failure ([facility] [address] J96.90  Elevated lipase R74.8  Abdominal pain R10.9  Counseling regarding goals of care Z71.89  
 Acute pancreatitis K85.90  Breast cancer ([facility] [address] C50.919  Intertrigo L30.4  PNA (pneumonia) J18.9 Respiratory Therapist: Inocencio Baltazar RT

## 2018-09-26 NOTE — PROGRESS NOTES
Problem: Falls - Risk of 
Goal: *Absence of Falls Document Lola Lockett Fall Risk and appropriate interventions in the flowsheet. Outcome: Progressing Towards Goal 
Fall Risk Interventions: 
Mobility Interventions: Bed/chair exit alarm, Communicate number of staff needed for ambulation/transfer, OT consult for ADLs, PT Consult for mobility concerns, PT Consult for assist device competence, Utilize walker, cane, or other assistive device Medication Interventions: Bed/chair exit alarm, Teach patient to arise slowly, Patient to call before getting OOB Elimination Interventions: Call light in reach, Patient to call for help with toileting needs, Toileting schedule/hourly rounds

## 2018-09-26 NOTE — PROGRESS NOTES
physical Therapy EVALUATION/DISCHARGE Patient: Katherine Hernandez (89 y.o. female) Date: 2018 Primary Diagnosis: PNA (pneumonia) Precautions:     
ASSESSMENT : 
Based on the objective data described below, the patient presents with near baseline level of function. Prior to admit patient was living with family in a 1 level home with approx 4 NIKOS with rail. Reports she received HH for wound care but not PT. She is on 5 L O2 at baseline and ambulates only short distances in the home. Patient overall needing supervision for her bed mobility and transfers. In standing has good balance and is able to turn in full [place] to untangle her O2 cord without any assist or LOB. Patient intermittently on and off the phone during short therapy visit and overall appears disinterested in participating. Overall patient's baseline morbid obesity and non compliance are the main limiting factors at this time. PT to signoff at this time. Please reconsult if patient status changes. Skilled physical therapy is not indicated at this time. PLAN : 
Discharge Recommendations: None Further Equipment Recommendations for Discharge: none SUBJECTIVE:  
Patient stated I get [place] for my wounds and that is it.  OBJECTIVE DATA SUMMARY:  
HISTORY:   
Past Medical History:  
Diagnosis Date  Arthritis  Asthma  Breast lump  CAD (coronary artery disease)  Congestive heart failure ([address]  COPD (chronic obstructive pulmonary disease) ([facility])  Diabetes [facility]  Hypertension  Morbid obesity with BMI of 70 and over, adult ([facility]  NSTEMI (non-ST elevated myocardial infarction) ([facility]  SVT (supraventricular tachycardia) ([facility]) Past Surgical History:  
Procedure Laterality Date  CARDIAC SURG PROCEDURE UNLIST Stents  HX  SECTION    
 HX ORTHOPAEDIC    
 HX OTHER SURGICAL    
 cyst removed from back Prior Level of Function/Home Situation: Baseline amb only short household distances. Personal factors and/or comorbidities impacting plan of care:  
 
Home Situation Home Environment: Private residence # Steps to Enter: 4 Rails to Enter: Yes One/Two Story Residence: One story Living Alone: Yes Support Systems: Family member(s) Patient Expects to be Discharged to[de-identified] Private residence Current DME Used/Available at Home: Iliana Rodriguez EXAMINATION/PRESENTATION/DECISION MAKING:  
Critical Behavior: 
Neurologic State: Alert Orientation Level: Oriented X4 Hearing: Auditory Auditory Impairment: None Range Of Motion: 
AROM: Generally decreased, functional 
  
  
  
  
  
  
  
Strength:   
Strength: Generally decreased, functional 
  
  
  
  
  
  
Tone & Sensation:  
  
  
  
  
  
  
  
  
  
   
Coordination: 
  
Vision:  
  
Functional Mobility: 
Bed Mobility: 
Rolling: Modified independent Supine to Sit: Modified independent Scooting: Modified independent Transfers: 
Sit to Stand: Modified independent Stand to Sit: Modified independent Balance:  
Sitting: Intact Standing: Impaired Standing - Static: Constant support;Good Standing - Dynamic : Fair Ambulation/Gait Training: 
Distance (ft): 5 Feet (ft) Ambulation - Level of Assistance: Supervision Gait Description (WDL): Exceptions to [facility] Gait Abnormalities: Decreased step clearance;Shuffling gait Base of Support: Widened Speed/Danay: Pace decreased (<100 feet/min); Shuffled; Slow Step Length: Left shortened;Right shortened Functional Measure: 
Barthel Index: 
 
Bathin Bladder: 10 Bowels: 10 
Groomin Dressin Feeding: 10 Mobility: 0 Stairs: 5 Toilet Use: 5 Transfer (Bed to Chair and Back): 10 Total: 60 Barthel and G-code impairment scale: 
Percentage of impairment CH 
0% CI 
1-19% CJ 
20-39% CK 
40-59% CL 
60-79% CM 
80-99% CN 
100% Barthel Score 0-100 100 99-80 79-60 59-40 20-39 1-19 
 0 Barthel Score 0-20 20 17-19 13-16 9-12 5-8 1-4 0 The Barthel ADL Index: Guidelines 1. The index should be used as a record of what a patient does, not as a record of what a patient could do. 2. The main aim is to establish degree of independence from any help, physical or verbal, however minor and for whatever reason. 3. The need for supervision renders the patient not independent. 4. A patient's performance should be established using the best available evidence. Asking the patient, friends/relatives and nurses are the usual sources, but direct observation and common sense are also important. However direct testing is not needed. 5. Usually the patient's performance over the preceding 24-48 hours is important, but occasionally longer periods will be relevant. 6. Middle categories imply that the patient supplies over 50 per cent of the effort. 7. Use of aids to be independent is allowed. Amanda Cole., Barthel, D.W. (3188). Functional evaluation: the Barthel Index. [facility] (14)2. [facility] rickie PEACE Hernandes, Willy Ashu., [facility]., [place], [address] [facility] (1999). Measuring the change indisability after inpatient rehabilitation; comparison of the responsiveness of the Barthel Index and Functional [place] Measure. Journal of Neurology, Neurosurgery, and Psychiatry, 66(4), 311-670. Aylin Lopez, N.J.A, EILEEN Zarco.VINICIO, & Merlyn Munoz MMangoA. (2004.) Assessment of post-stroke quality of life in cost-effectiveness studies: The usefulness of the Barthel Index and the EuroQoL-5D. [facility], 13, 418-15 G codes: In compliance with CMSs Claims Based Outcome Reporting, the following G-code set was chosen for this patient based on their primary functional limitation being treated:  
 
The outcome measure chosen to determine the severity of the functional limitation was the Barthel with a score of 60/100 which was correlated with the impairment scale. ? Mobility - Walking and Moving Around:  
  - CURRENT STATUS: CJ - 20%-39% impaired, limited or restricted  - GOAL STATUS: CI - 1%-19% impaired, limited or restricted  - D/C STATUS:  ---------------To be determined---------------  
 
 
Pain: 
Pain Scale 1: Numeric (0 - 10) Pain Intensity 1: 0 Activity Tolerance: VSS Please refer to the flowsheet for vital signs taken during this treatment. After treatment:  
[x]   Patient left in no apparent distress sitting up in chair 
[]   Patient left in no apparent distress in bed 
[x]   Call bell left within reach [x]   Nursing notified 
[x]   Caregiver present 
[]   Bed alarm activated COMMUNICATION/EDUCATION:  
Communication/Collaboration: 
[x]   Fall prevention education was provided and the patient/caregiver indicated understanding. [x]   Patient/family have participated as able and agree with findings and recommendations. []   Patient is unable to participate in plan of care at this time. Findings and recommendations were discussed with: Physical Therapist, Occupational Therapist and Registered Nurse Thank you for this referral. 
Gray Simmonds, PT, DPT Time Calculation: 11 mins

## 2018-09-26 NOTE — PROGRESS NOTES
Cardiopulmonary Care Interdisciplinary rounds were held today to discuss patient's plan of care and outcomes. The following members were present: NP/Physician, Pharmacy, Nursing and Case Management. Expected Length of Stay:  3d 16h Plan of Care: Continue current treatment plan

## 2018-09-26 NOTE — PROGRESS NOTES
HF Bundle/HUG Pt/ RRAT Score 30 Reason for Admission:   Pt is a 58 yo female admitted from home for evaluation/treatment of suspected RLL PNA. Pt has complicated medical hx which includes but not limited to acute on chronic diastolic heart failure; acute on chronic COPD exacerbation; obstructive sleep apnea/obesity hypoventilation syndrome on trilogy at home. Pt is being treated with IV abx and nebulizer treatments. On 5-6 lpm supple 02. RRAT Score:     30 Resources/supports as identified by patient/family:    
             
Top Challenges facing patient (as identified by patient/family and CM): Finances/Medication cost?     Medicaid/Medicare Transportation? [place] Medicaid Support system or lack thereof? Living arrangements? Lives alone - has Critical access hospital SERVICES aides Self-care/ADLs/Cognition? Pt has a Trilogy/ home 02 through [facility]/ [facility] 
       
Current Advanced Directive/Advance Care Plan: On file Plan for utilizing home health:    Pt is currently open to [facility] for wound care. Likelihood of readmission:  High 
              
Transition of Care Plan:      Awaiting PT/OT recommendations. Anticipate pt will return home with Personal Care aides and [place] as pta - may need to add PT/OT if not currently receiving theses svcs. Pt apparently will need new Pulmonologist at [place]. Pt will likely need Medicaid WC or medical transport depending on 02 needs and functional level at dc. Care Management Interventions PCP Verified by CM: Yes 
Palliative Care Criteria Met (RRAT>21 & CHF Dx)?: Yes 
Palliative Consult Recommended?: Yes Mode of Transport at Discharge: [address] Transition of Care Consult (CM Consult): Discharge Planning (Pt was assessed for possible dc needs.) Discharge Durable Medical Equipment: No 
Physical Therapy Consult:  Yes 
 Occupational Therapy Consult: Yes Speech Therapy Consult: No 
Current Support Network: Lives Alone Confirm Follow Up Transport: Wheelchair Isael Fuentes Plan discussed with Pt/Family/Caregiver: Yes Freedom of Choice Offered: Yes Discharge Location Discharge Placement: Home with home health ROSCOE Lynne

## 2018-09-26 NOTE — CDMP QUERY
Please clarify if this patient is (was) being treated/managed for:  
 
=> Type 2 diabetes mellitus with hyperglycemia in the setting Acute on Chronic COPD exacerbation requiring DTC consult increased insulin. 
=> Other explanation of clinical findings 
=> Clinically Undetermined (no explanation for clinical findings) The medical record reflects the following clinical findings, treatment, and risk factors. Risk Factors:  h/o DM2; COPD;  
Clinical Indicators:  BS: 297; 314; 294; 289; 229; 282; 338. .. Diabetes not controlled due to IV solumedrol Treatment: DTC consult; Increase Lantus to home dose of  30 units. continue sliding scale insulin and diabetic diet Please clarify and document your clinical opinion in the progress notes and discharge summary including the definitive and/or presumptive diagnosis, (suspected or probable), related to the above clinical findings. Please include clinical findings supporting your diagnosis. Thank Mayela Arciniega [facility] 
[phone]

## 2018-09-26 NOTE — CARDIO/PULMONARY
C/P Rehab note-chart reviewed. Pt is on CHF bundle list 
 
Adm with Acute on chronic hypercarbic respiratory failure, multifactorial 
Acute on chronic diastolic heart failure Acute on chronic COPD exacerbation Right lower lobe pneumonia. Last EF 1/22/2018 55-60%. HX includes Diastolic CHF, HTN, Asthma,SVT, NSTEMI,PTCA, GERD, Diabetes, COPD. Current everyday smoker. DIET DIABETIC CONSISTENT CARB Regular Met with pt sitting up in chair asleep- awakened with gentle name call. Living with Heart Failure Booklet given to Yariel Fabian. Pt stated she did not have a scale to weigh and a staff member was looking in to getting her one for home use. Lives with family. Attempt to review CHF book and pt nodded off twice during conversation- when asked if she was tired she stated\" Yes, I took a muscle relaxer and it is making me sleepy\" Smoking history assessed. Patient is a current everyday smoker. Smoking Cessation Program link added to the AVS. Yariel Blankmegan could benefit from further education on the following HF topics: all.

## 2018-09-26 NOTE — PROGRESS NOTES
Hospitalist Progress Note NAME: Lucero Herrera :  1970 MRN:  [id number] Assessment / Plan: 
 
57 y.o. female with pmh of morbid obesity, acute on chronic hypercarbic respiratory failure, COPD, DM, HTN, GEN/OHS on trilogy at home, chronic diastolic heart failure and recent diagnosis of breast cancer. She presented to ED with shortness of breath and dry cough. Acute on chronic hypercarbic respiratory failure, multifactorial 
Acute on chronic diastolic heart failure Acute on chronic COPD exacerbation Right lower lobe pneumonia CXR with RLL PNA 
: sputum culture non-diagnostic Slowly improving. continue Cefepime, systemic steroids, mucolytics, nebulizer treatments and Acapella. Stop levaquin. Obstructive sleep apnea/obesity hypoventilation syndrome, on trilogy at home BIPAP  at bedtime Hypophosphatemia Replete Recent dx Breast CA She was recently diagnosed with breast cancer and is in the process of deciding what therapy to proceed with. Type 2 diabetes mellitus with hyperglycemia in the setting Acute on Chronic COPD exacerbation requiring DTC consult and increased insulin. Diabetes not controlled due to IV solumedrol. Patient reports she takes Lantus 30 units. Increase Lantus to home dose of  30 units. continue sliding scale insulin and diabetic diet Hypertension Bp is controlled Hyperlipidemia 
continue her statin Constipation On miralax daily. No BM for 3 days and having nausea. Add daily senna. Rectal Dulcolaxx1. Fleet enema prn. Obesity, super morbid 
outpatient weight loss program BMI 66 
 
 
40 or above Morbid obesity / Body mass index is 65.78 kg/(m^2). Code status: Full Prophylaxis: Hep SQ Recommended Disposition: Home w/Family Subjective: Chief Complaint / Reason for Physician Visit \"constipation. Nausea. No vomiting. \".  Discussed with RN events overnight. Review of Systems: Symptom Y/N Comments  Symptom Y/N Comments Fever/Chills n   Chest Pain n   
Poor Appetite    Edema y Cough y   Abdominal Pain n   
Sputum    Joint Pain SOB/RAZO y   Pruritis/Rash Nausea/vomit n   Tolerating PT/OT Diarrhea n   Tolerating Diet y Constipation y   Other Could NOT obtain due to:   
 
Objective: VITALS:  
Last 24hrs VS reviewed since prior progress note. Most recent are: 
Patient Vitals for the past 24 hrs: 
 Temp Pulse Resp BP SpO2  
09/26/18 0723 97.9 °F (36.6 °C) 80 22 143/65 91 %  
09/26/18 0400 98.4 °F (36.9 °C) 81 22 102/50 96 %  
09/26/18 0316 - - - - 93 % 09/26/18 0034 98.2 °F (36.8 °C) 88 22 132/67 91 %  
09/25/18 2054 - - - - 92 %  
09/25/18 2035 98.1 °F (36.7 °C) (!) 106 22 105/60 92 %  
09/25/18 1535 98.5 °F (36.9 °C) 87 22 100/44 92 %  
09/25/18 1532 - - - - 92 %  
09/25/18 1141 98 °F (36.7 °C) 91 22 104/45 90 % 09/25/18 1112 - - - - 91 %  
09/25/18 0825 98.1 °F (36.7 °C) 100 18 118/73 91 %  
09/25/18 0801 - - - - 96 % Intake/Output Summary (Last 24 hours) at 09/26/18 9705 Last data filed at 09/26/18 7306 Gross per 24 hour Intake              980 ml Output             3750 ml Net            -2770 ml PHYSICAL EXAM: 
General: Alert, cooperative, no acute distress. Very obese EENT:  Anicteric sclerae. Resp:  CTA bilaterally, no wheezing or rales. No accessory muscle use CV:  Regular  Rate, + LE edema GI:  Soft, Non distended, Non tender.  +Bowel sounds Neurologic:  Alert and oriented X 3, normal speech, Psych:   Good insight. Not anxious nor agitated Skin:  Bilateral leg hyperkeratosis with chronic venous stasis changes Reviewed most current lab test results and cultures  YES Reviewed most current radiology test results   YES Review and summation of old records today    NO Reviewed patient's current orders and MAR    YES 
PMH/SH reviewed - no change compared to H&P 
 ________________________________________________________________________ 
________________________________________________________________________ Mirian Bo MD  
 
Procedures: see electronic medical records for all procedures/Xrays and details which were not copied into this note but were reviewed prior to creation of Plan. LABS: 
I reviewed today's most current labs and imaging studies. Pertinent labs include: 
Recent Labs  
   09/24/18 2057 WBC  8.3 HGB  11.9 HCT  41.1 PLT  162 Recent Labs  
   09/26/18 
 0041  09/25/18 0226  09/24/18 2057 NA  137  139  139  
K  4.3  4.0  4.1 CL  98  99  101 CO2  35*  34*  37* GLU  278*  323*  131* BUN  17  13  12 CREA  0.84  0.92  0.65  
CA  10.4*  9.7  10.5*  10.5* MG   --   1.9   --   
PHOS  1.4*  1.5*   --   
ALB   --   3.1*   --   
TBILI   --   0.3   --   
SGOT   --   12*   --   
ALT   --   18   --   
 
 
Signed: Mirian Bo MD

## 2018-09-26 NOTE — PROGRESS NOTES
1900:  
Bedside shift change report given to Antonella Miller RN (oncoming nurse). Report included the following information SBAR and Kardex. SHIFT SUMMARY: 
 
 
 
 
 
[address] NURSING NOTE Admission Date 9/24/2018 Admission Diagnosis PNA (pneumonia) Consults None Cardiac Monitoring [x] Yes [] No  
  
Purposeful Hourly Rounding [x] Yes   
Yu Score Total Score: 3 Yu score 3 or > [x] Bed Alarm [] Avasys [] 1:1 sitter [] Patient refused (Signed refusal form in chart) Jeffrey Score Jeffrey Score: 18 Jeffrey score 14 or < [] PMT consult [] Wound Care consult  
 []  Specialty bed  [] Nutrition consult Influenza Vaccine Received Flu Vaccine for Current Season (usually Sept-March): No  
 Patient/Guardian Refused (Notify MD): No  
  
Oxygen needs? [] Room air Oxygen @  []1L    []2L    []3L   []4L    []5L   [x]6L via NC Chronic home O2 use? [x] Yes [] No 
Perform O2 challenge test and document in progress note using smartphrase (.Homeoxygen) Last bowel movement Last Bowel Movement Date: 09/24/18 Urinary Catheter LDAs Peripheral IV 09/24/18 Left Antecubital (Active) Site Assessment Clean, dry, & intact 9/26/2018  2:00 PM  
Phlebitis Assessment 0 9/26/2018  2:00 PM  
Infiltration Assessment 0 9/26/2018  2:00 PM  
Dressing Status Clean, dry, & intact 9/26/2018  2:00 PM  
Dressing Type Transparent 9/26/2018  2:00 PM  
Hub Color/Line Status Pink;Flushed 9/26/2018  2:00 PM  
                  
  
Readmission Risk Assessment Tool Score High Risk   
      
 30 Total Score 3 Has Seen PCP in Last 6 Months (Yes=3, No=0)  
 11 IP Visits Last 12 Months (1-3=4, 4=9, >4=11) 9 Pt. Coverage (Medicare=5 , Medicaid, or Self-Pay=4) 7 Charlson Comorbidity Score (Age + Comorbid Conditions) Criteria that do not apply:  
 . Living with Significant Other. Assisted Living. LTAC. SNF. or  
Rehab Patient Length of Stay (>5 days = 3) Expected Length of Stay 3d 16h Actual Length of Stay 2

## 2018-09-26 NOTE — PROGRESS NOTES
0700: Bedside shift change report given to Papa Forbes RN (oncoming nurse) by Asa Crane RN (offgoing nurse). Report included the following information SBAR and Kardex. 0800: Tele discontinued. Per Dr. Vinay Medrano, pt is okay to be transferred to [facility]. Awaiting room placement. 0900: Patient refusing wound care despite educating pt on the importance of this. Will reassess and offer again.

## 2018-09-26 NOTE — DIABETES MGMT
DTC Progress Note Recommendations/ Comments: Chart reviewed for hyperglycemia, likely due to steroids and higher insulin needs. Noted Lantus was increased to 30 units daily and methylprednisolone 40 mg q8hrs discontinued, now on Prednisone 40 mg daily If appropriate, please consider: 
Changing correction insulin to insulin resistant scale Current hospital DM medication: Lantus 30 units (first dose will be given tonight, previously 15 units), Humalog for correction, normal sensitivity scale Chart reviewed on [place]. Patient is a 57 y.o. female with hx Type 2 Diabetes on Lantus 15 units daily and Glyburide 5 mg with breakfast at home. A1c:  
Lab Results Component Value Date/Time Hemoglobin A1c 8.2 (H) 08/21/2018 02:22 AM  
 Hemoglobin A1c 5.3 07/25/2017 05:30 AM  
 
 
Recent Glucose Results: Lab Results Component Value Date/Time  (H) 09/26/2018 12:41 AM  
 GLUCPOC 282 (H) 09/26/2018 11:36 AM  
 GLUCPOC 229 (H) 09/26/2018 07:21 AM  
 GLUCPOC 289 (H) 09/25/2018 09:34 PM  
  
 
Lab Results Component Value Date/Time Creatinine 0.84 09/26/2018 12:41 AM  
 
Estimated Creatinine Clearance: 149.5 mL/min (based on Cr of 0.84). Active Orders Diet DIET DIABETIC CONSISTENT CARB Regular PO intake: Patient Vitals for the past 72 hrs: 
 % Diet Eaten  
09/26/18 1312 100 % 09/26/18 0910 25 % Will continue to follow as needed. Thank you Josie Marin RD, CDE [facility]

## 2018-09-27 ENCOUNTER — HOME HEALTH ADMISSION (OUTPATIENT)
Dept: HOME HEALTH SERVICES | Facility: HOME HEALTH | Age: 48
End: 2018-09-27

## 2018-09-27 VITALS
OXYGEN SATURATION: 91 % | SYSTOLIC BLOOD PRESSURE: 121 MMHG | HEIGHT: 67 IN | RESPIRATION RATE: 22 BRPM | WEIGHT: 293 LBS | HEART RATE: 77 BPM | BODY MASS INDEX: 45.99 KG/M2 | TEMPERATURE: 97.6 F | DIASTOLIC BLOOD PRESSURE: 86 MMHG

## 2018-09-27 LAB
BACTERIA SPEC CULT: NORMAL
GLUCOSE BLD STRIP.AUTO-MCNC: 146 MG/DL (ref 65–100)
GLUCOSE BLD STRIP.AUTO-MCNC: 228 MG/DL (ref 65–100)
GRAM STN SPEC: NORMAL
SERVICE CMNT-IMP: ABNORMAL
SERVICE CMNT-IMP: ABNORMAL
SERVICE CMNT-IMP: NORMAL

## 2018-09-27 PROCEDURE — 74011636637 HC RX REV CODE- 636/637: Performed by: INTERNAL MEDICINE

## 2018-09-27 PROCEDURE — 94660 CPAP INITIATION&MGMT: CPT

## 2018-09-27 PROCEDURE — 77010033678 HC OXYGEN DAILY

## 2018-09-27 PROCEDURE — 74011250636 HC RX REV CODE- 250/636: Performed by: EMERGENCY MEDICINE

## 2018-09-27 PROCEDURE — 94640 AIRWAY INHALATION TREATMENT: CPT

## 2018-09-27 PROCEDURE — 94760 N-INVAS EAR/PLS OXIMETRY 1: CPT

## 2018-09-27 PROCEDURE — 74011000250 HC RX REV CODE- 250: Performed by: EMERGENCY MEDICINE

## 2018-09-27 PROCEDURE — 82962 GLUCOSE BLOOD TEST: CPT

## 2018-09-27 PROCEDURE — 74011000258 HC RX REV CODE- 258: Performed by: EMERGENCY MEDICINE

## 2018-09-27 PROCEDURE — 74011250636 HC RX REV CODE- 250/636: Performed by: GENERAL ACUTE CARE HOSPITAL

## 2018-09-27 PROCEDURE — 74011636637 HC RX REV CODE- 636/637: Performed by: EMERGENCY MEDICINE

## 2018-09-27 PROCEDURE — 74011250637 HC RX REV CODE- 250/637: Performed by: EMERGENCY MEDICINE

## 2018-09-27 PROCEDURE — 74011250637 HC RX REV CODE- 250/637: Performed by: INTERNAL MEDICINE

## 2018-09-27 RX ORDER — AMOXICILLIN AND CLAVULANATE POTASSIUM 875; 125 MG/1; MG/1
1 TABLET, FILM COATED ORAL EVERY 12 HOURS
Qty: 4 TAB | Refills: 0 | Status: SHIPPED | OUTPATIENT
Start: 2018-09-27 | End: 2018-09-29

## 2018-09-27 RX ADMIN — INSULIN LISPRO 2 UNITS: 100 INJECTION, SOLUTION INTRAVENOUS; SUBCUTANEOUS at 08:25

## 2018-09-27 RX ADMIN — Medication 10 ML: at 08:30

## 2018-09-27 RX ADMIN — HEPARIN SODIUM 7500 UNITS: 5000 INJECTION, SOLUTION INTRAVENOUS; SUBCUTANEOUS at 13:31

## 2018-09-27 RX ADMIN — FUROSEMIDE 40 MG: 10 INJECTION, SOLUTION INTRAMUSCULAR; INTRAVENOUS at 08:27

## 2018-09-27 RX ADMIN — MICONAZOLE NITRATE: 20 CREAM TOPICAL at 12:56

## 2018-09-27 RX ADMIN — INSULIN GLARGINE 30 UNITS: 100 INJECTION, SOLUTION SUBCUTANEOUS at 08:25

## 2018-09-27 RX ADMIN — Medication: at 12:56

## 2018-09-27 RX ADMIN — CYCLOBENZAPRINE HYDROCHLORIDE 5 MG: 10 TABLET, FILM COATED ORAL at 13:37

## 2018-09-27 RX ADMIN — Medication 10 ML: at 06:09

## 2018-09-27 RX ADMIN — ASPIRIN 81 MG 81 MG: 81 TABLET ORAL at 08:27

## 2018-09-27 RX ADMIN — CEFEPIME HYDROCHLORIDE 2 G: 2 INJECTION, POWDER, FOR SOLUTION INTRAVENOUS at 06:05

## 2018-09-27 RX ADMIN — SENNOSIDES AND DOCUSATE SODIUM 1 TABLET: 8.6; 5 TABLET ORAL at 08:27

## 2018-09-27 RX ADMIN — IPRATROPIUM BROMIDE AND ALBUTEROL SULFATE 3 ML: .5; 3 SOLUTION RESPIRATORY (INHALATION) at 01:08

## 2018-09-27 RX ADMIN — INSULIN LISPRO 3 UNITS: 100 INJECTION, SOLUTION INTRAVENOUS; SUBCUTANEOUS at 12:58

## 2018-09-27 RX ADMIN — PREDNISONE 40 MG: 20 TABLET ORAL at 08:27

## 2018-09-27 RX ADMIN — Medication 10 ML: at 14:00

## 2018-09-27 RX ADMIN — GUAIFENESIN 600 MG: 600 TABLET, EXTENDED RELEASE ORAL at 12:58

## 2018-09-27 RX ADMIN — HEPARIN SODIUM 7500 UNITS: 5000 INJECTION, SOLUTION INTRAVENOUS; SUBCUTANEOUS at 00:55

## 2018-09-27 RX ADMIN — IPRATROPIUM BROMIDE AND ALBUTEROL SULFATE 3 ML: .5; 3 SOLUTION RESPIRATORY (INHALATION) at 08:14

## 2018-09-27 RX ADMIN — CEFEPIME HYDROCHLORIDE 2 G: 2 INJECTION, POWDER, FOR SOLUTION INTRAVENOUS at 14:59

## 2018-09-27 RX ADMIN — POLYETHYLENE GLYCOL 3350 17 G: 17 POWDER, FOR SOLUTION ORAL at 08:26

## 2018-09-27 RX ADMIN — METOPROLOL TARTRATE 25 MG: 25 TABLET ORAL at 08:27

## 2018-09-27 RX ADMIN — DIBASIC SODIUM PHOSPHATE, MONOBASIC POTASSIUM PHOSPHATE AND MONOBASIC SODIUM PHOSPHATE 1 TABLET: 852; 155; 130 TABLET ORAL at 08:27

## 2018-09-27 RX ADMIN — IPRATROPIUM BROMIDE AND ALBUTEROL SULFATE 3 ML: .5; 3 SOLUTION RESPIRATORY (INHALATION) at 13:18

## 2018-09-27 NOTE — DISCHARGE INSTRUCTIONS
HOSPITALIST DISCHARGE INSTRUCTIONS    NAME: Purnima Watson   :  1970   MRN:  [id number]     Date/Time:  2018 10:43 AM    ADMIT DATE: 2018   DISCHARGE DATE: 2018     Attending Physician: Dipesh Moscoso MD    DISCHARGE DIAGNOSIS:  PNA  DM  HTN  COPD  Severe obesity      Medications: Per above medication reconciliation. Pain Management: per above medications    Recommended diet: Cardiac Diet, Diabetic Diet and limit your fluid intake to 1.5 liters per day    Recommended activity: Activity as tolerated    Supplemental Oxygen: Chronic Oxygen,  6  LNC at baseline    Glucose management:  check your sugars before meals and at bedtime. write down the numbers and bring record to doctor's appointment    Code status: Full        Outside physician follow up: Follow-up Information     Follow up With Details Comments Jessie Guerra NP In 1 week  [address] [facility]  [address]  [phone]              Information obtained by :  I understand that if any problems occur once I am at home I am to contact my physician. I understand and acknowledge receipt of the instructions indicated above. Physician's or R.N.'s Signature                                                                  Date/Time                                                                                                                                              Patient or Repres      Smoking Cessation Program:   This is a free,  phone/text/email based, smoking cessation program. The program is individualized to meet each patient's needs. To enroll use this link [email or web address]

## 2018-09-27 NOTE — PROGRESS NOTES
0730 
Bedside shift change report given by Ayanna Hightower RN (offgoing nurse). Report included the following information SBAR, Kardex, ED Summary, Intake/Output, MAR, Accordion, Recent Results and Med Rec Status. 1640 DISCHARGE SUMMARY from Nurse Patient stable for discharge. I have reviewed discharge instructions with the patient. The patient verbalized understanding. All questions fully answered. No personal belongings, home medications and valuables left at patients bedside//safe. patient verbalized no complaints. Prescriptions and medication handouts given to patient. Telemonitor and PIV removed. Was discharged home by [facility].

## 2018-09-27 NOTE — PROGRESS NOTES
PCP LETICIA appt scheduled with [facility] to see PT in 24-48 hours after discharge at 9:00am. Appt added to [address] CM Specialist

## 2018-09-27 NOTE — PROGRESS NOTES
Cm acknowledged discharge order. Patient is presently on 6LNCO2. Baseline is 5LNCO2. Patient has access to home but does not have anyone who can bring portable to the hospital for transport home. Someone will be at the house to receive patient at 4:00pm.  
Patient has also requested medication assistance? Chart review. PTOT - no needs Patient chart reflects Medicare and Medicaid. Copies of cards obtained. Estefania Michael UR Review has informed this CM that patient has active [address] Medicaid. Cm has reviewed with Sidney Davies at [facility] regarding transportation needs. Medicaid will need notified. Sidney Davies closes at [facility] [facility].  Transport will need to be set up prior to. Patient informed of Medicaid status. Preferred pharmacy is [facility] [address] faxed copies of insurance cards with scripts to [facility] [phone] Patient has informed this CM she has 5 steps entry into the home and requires assistance up the steps. Transport request submitted to [facility]. Requested Bariatric team/equipment. Patient stated weight is 442lbs. 6LNC O2. Requested 1600 transport. [facility] patient. Qualifies for [facility] visit. CM discussed with patient and she is in agreement. Chart review suggests new Pulmunologist?  CM discussed with patient. She does not have a pulmonolgist?  Patient has home O2 and Trilogy. Cm has called Roselynbarbara Guidodao at [facility] to confirm. Fax facesheet to:  [phone] CM has paged attending to discuss. Consult to CM for OP referral and follow up to Pulmonology. [facility] has confirmed 1630 today. Bariatric team/equipment. [id number] [facility] for wound care. [facility] Care Management Interventions PCP Verified by CM: Yes 
Palliative Care Criteria Met (RRAT>21 & CHF Dx)?: Yes 
Palliative Consult Recommended?: Yes Mode of Transport at Discharge: BLS Transition of Care Consult (CM Consult): [facility] and MICKEY from [address] [place] [address]) [address] Yes Discharge Durable Medical Equipment: No 
Physical Therapy Consult: Yes Occupational Therapy Consult: Yes Speech Therapy Consult: No 
Current Support Network: Lives Alone, Has Personal Caregivers Confirm Follow Up Transport: Other (see comment) (Medical transport) Plan discussed with Pt/Family/Caregiver: Yes Freedom of Choice Offered: Yes Discharge Location Discharge Placement: Home with home health Cm will continue to follow. Argenis Pond RN CM Ext [phone]

## 2018-09-27 NOTE — CARDIO/PULMONARY
C/P Rehab note-chart reviewed. 
  
Pt is on CHF bundle list 
  
Adm with Acute on chronic hypercarbic respiratory failure, multifactorial 
Acute on chronic diastolic heart failure Acute on chronic COPD exacerbation Right lower lobe pneumonia. 
  
 
Attempt x 2 to wake pt for teaching -after gentle name call x 2- pt continues to sleep soundly-no family at bedside at this time.

## 2018-09-27 NOTE — PROGRESS NOTES
Patient to be discharged home with [facility] follow up and [facility]. PCP informed of need for follow up visit within 3-5 days. They will schedule with patient. [facility] will provide H2H. PT OT signed off. Patient is on [address] O2. Patient is morbidly obese. Fall risk. Needs assistance getting up steps. [facility] to provide transportation at 1630 today. PCS, H&P and Facesheet on chart. There is no report to call. Patient is being discharged home. Late entry - 9/28/2018 9/27/2018 @ 1545 This CM personally picked up Discharge RX at [address] Rx on 9/27/2018. Hand delivered to patient, witnessed by [facility]. [facility] paid for meds. Medical Assistance forms provided to Carmelina Bartholomew [address] on 9/28/2018. Kayden Tamayo RN CM Ext [phone] KHRIS Sauer Ext [phone]

## 2018-09-27 NOTE — DISCHARGE SUMMARY
Hospitalist Discharge Summary     Patient ID:  Rosamaria Kingston  [id number]  62 y.o.  1970    PCP on record: Maegan Riojas NP    Admit date: 9/24/2018  Discharge date and time: 9/27/2018      DISCHARGE DIAGNOSIS:  PNA  DM  HTN  COPD  Morbid obesity BMI 66      CONSULTATIONS:  None    _____________________________________________________________________  DISCHARGE SUMMARY/HOSPITAL COURSE:  for full details see H&P, daily progress notes, labs, consult notes. 57 y.o. female with pmh of morbid obesity, acute on chronic hypercarbic respiratory failure, COPD, DM, HTN, GEN/OHS on trilogy at home, chronic diastolic heart failure and recent diagnosis of breast cancer. She presented to ED with shortness of breath and dry cough. CXR with RLL PNA. Admitted with diagnosis of acute on chronic hypercarbic respiratory failure secondary to RLL bacterial CAP, acute on chronic diastolic heart failure and acute COPD exacerbation. Patient received cefepime, systemic steroids, mucolytics, nebulizer treatments and Acapella with clinical improvement. 09/24: sputum culture non-diagnostic    PLAN:   - Augmentin for 2 more day. - F/u with PCP for outpatient weight loss program, CHF, DM, HTN,     ______________________________________________________________________  Patient seen and examined by me on discharge day. Pertinent Findings:  Gen:    Not in acute pain or distress. Very obese  Chest: Clear to auscultation bilaterlly  CVS:   Regular rate. No LE edema  Abd:  Soft, not distended, not tender  Neuro:  Alert, Ox3. Normal gait. Ambulated to bedside commode on her own.  _______________________________________________________________________  DISCHARGE MEDICATIONS:   Current Discharge Medication List      START taking these medications    Details   amoxicillin-clavulanate (AUGMENTIN) 875-125 mg per tablet Take 1 Tab by mouth every twelve (12) hours for 2 days.   Qty: 4 Tab, Refills: 0         CONTINUE these medications which have NOT CHANGED    Details   ketoconazole (NIZORAL) 2 % topical cream Apply  to affected area daily. Qty: 15 g, Refills: 0      nystatin (MYCOSTATIN) powder Apply  to affected area two (2) times a day. Qty: 1 Bottle, Refills: 0      guaiFENesin ER (MUCINEX) 600 mg ER tablet Take 1 Tab by mouth two (2) times a day. Qty: 14 Tab, Refills: 0      lidocaine (LIDODERM) 5 % 2 Patches by TransDERmal route every twenty-four (24) hours. Apply patch to the affected area for 12 hours a day and remove for 12 hours a day. Qty: 10 Each, Refills: 0    Associated Diagnoses: Bilateral low back pain with sciatica, sciatica laterality unspecified, unspecified chronicity      oxyCODONE IR (ROXICODONE) 5 mg immediate release tablet Take 1 Tab by mouth every four (4) hours as needed (pain 6-10). Max Daily Amount: 30 mg.  Qty: 15 Tab, Refills: 0    Associated Diagnoses: Malignant neoplasm of nipple of right breast in female, unspecified estrogen receptor status (HCC)      polyethylene glycol (MIRALAX) 17 gram packet Take 1 Packet by mouth daily. Qty: 30 Packet, Refills: 0      glucose blood VI test strips (EASYGLUCO TEST) strip by Does Not Apply route ACB/HS. Qty: 100 Strip, Refills: 0      albuterol (VENTOLIN HFA) 90 mcg/actuation inhaler Take 2 Puffs by inhalation every six (6) hours as needed for Wheezing. cyclobenzaprine (FLEXERIL) 5 mg tablet Take 5 mg by mouth two (2) times a day. flexeriol      metoprolol succinate (TOPROL-XL) 25 mg XL tablet Take  by mouth two (2) times a day. furosemide (LASIX) 40 mg tablet Take 40 mg by mouth two (2) times a day. Indications: Peripheral Edema due to Chronic Heart Failure      albuterol (PROVENTIL VENTOLIN) 2.5 mg /3 mL (0.083 %) nebulizer solution 3 mL by Nebulization route every four (4) hours as needed for Wheezing. Qty: 24 Each, Refills: 0      aspirin 81 mg chewable tablet Take 81 mg by mouth daily.       insulin glargine (LANTUS) 100 unit/mL injection 15 Units by SubCUTAneous route daily. hydrOXYzine pamoate (VISTARIL) 50 mg capsule Take 50 mg by mouth every eight (8) hours as needed for Itching. Indications: Pruritus of Skin      fluticasone (FLONASE) 50 mcg/actuation nasal spray 2 Sprays by Both Nostrils route daily. Qty: 1 Bottle, Refills: 0      lovastatin (MEVACOR) 40 mg tablet Take 1 Tab by mouth nightly. Qty: 30 Tab, Refills: 6    Associated Diagnoses: Atherosclerosis of native coronary artery without angina pectoris      glyBURIDE (DIABETA) 5 mg tablet Take 5 mg by mouth Daily (before breakfast). cetirizine (ZYRTEC) 10 mg tablet Take 10 mg by mouth daily as needed for Allergies. ammonium lactate (LAC-HYDRIN) 12 % topical cream rub in to affected area well  Qty: 280 g, Refills: 1      nitroglycerin (NITROSTAT) 0.4 mg SL tablet 1 Tab by SubLINGual route every five (5) minutes as needed for Chest Pain (call 911 if not relieved by 3). Qty: 25 Tab, Refills: 2    Associated Diagnoses: SVT (supraventricular tachycardia) ([facility]); CHF (congestive heart failure) ([address]             My Recommended Diet, Activity, Wound Care, and follow-up labs are listed in the patient's Discharge Insturctions which I have personally completed and reviewed.     ______________________________________________________________________    Risk of deterioration: Low    Condition at Discharge:  Stable  ______________________________________________________________________    Disposition  Home with family and home health services  ______________________________________________________________________    Care Plan discussed with:   Patient, RN, Care Manager    ______________________________________________________________________    Code Status: Full Code  ______________________________________________________________________      Follow up with:   PCP : Susan Santos NP  Follow-up Information     Follow up With Details Comments Jessie Guerra, NP Call today PCP - follow up 3-5 days. [facility] pateint. QUINN spoke with April. Patient will be scheduled for post hospital follow up. [facility] [address]       [facility] [address]. On 10/30/2018 Pulmonology - New patient appointment. Tuesday October 10:15am.  Arrival time 9:45am   [phone]  appt is with Dr. Vita Garay  [address]  Will contact you regarding a hospital to home visit [facility]  [facility] Expect a phone call from [facility] to schedule a follow up visit with you in 24-48 hours. If you have questions please Contact #[phone] Visit at [address] [facility] and  [facility] have teamed up to make care upon discharge more convenient. A team of doctors, nurse practitioners and EMTs, that are equipped with all the tools necessary to provide advanced medical care in the comfort of your home.       [facility]   Will provide [facility] for wound care [facility] health  [phone]              Total time in minutes spent coordinating this discharge (includes going over instructions, follow-up, prescriptions, and preparing report for sign off to her PCP) :  40 minutes    Signed:  Jeremy Ellison MD

## 2018-10-01 ENCOUNTER — HOME CARE VISIT (OUTPATIENT)
Dept: SCHEDULING | Facility: HOME HEALTH | Age: 48
End: 2018-10-01

## 2018-10-01 PROCEDURE — G0299 HHS/HOSPICE OF RN EA 15 MIN: HCPCS

## 2018-10-20 ENCOUNTER — APPOINTMENT (OUTPATIENT)
Dept: GENERAL RADIOLOGY | Age: 48
DRG: 291 | End: 2018-10-20
Attending: EMERGENCY MEDICINE
Payer: MEDICARE

## 2018-10-20 ENCOUNTER — HOSPITAL ENCOUNTER (INPATIENT)
Age: 48
LOS: 6 days | Discharge: HOME HEALTH | DRG: 291 | End: 2018-10-26
Attending: EMERGENCY MEDICINE | Admitting: INTERNAL MEDICINE
Payer: MEDICARE

## 2018-10-20 DIAGNOSIS — J96.02 ACUTE RESPIRATORY FAILURE WITH HYPOXIA AND HYPERCAPNIA (HCC): ICD-10-CM

## 2018-10-20 DIAGNOSIS — R53.83 OTHER FATIGUE: ICD-10-CM

## 2018-10-20 DIAGNOSIS — J96.22 ACUTE ON CHRONIC RESPIRATORY FAILURE WITH HYPOXIA AND HYPERCAPNIA (HCC): ICD-10-CM

## 2018-10-20 DIAGNOSIS — J96.21 ACUTE ON CHRONIC RESPIRATORY FAILURE WITH HYPOXIA AND HYPERCAPNIA (HCC): ICD-10-CM

## 2018-10-20 DIAGNOSIS — J44.1 ACUTE EXACERBATION OF CHRONIC OBSTRUCTIVE PULMONARY DISEASE (COPD) (HCC): Primary | ICD-10-CM

## 2018-10-20 DIAGNOSIS — E66.01 OBESITY, MORBID (MORE THAN 100 LBS OVER IDEAL WEIGHT OR BMI > 40) (HCC): Chronic | ICD-10-CM

## 2018-10-20 DIAGNOSIS — R06.09 DYSPNEA ON EXERTION: ICD-10-CM

## 2018-10-20 DIAGNOSIS — R05.8 PRODUCTIVE COUGH: ICD-10-CM

## 2018-10-20 DIAGNOSIS — J96.01 ACUTE RESPIRATORY FAILURE WITH HYPOXIA AND HYPERCAPNIA (HCC): ICD-10-CM

## 2018-10-20 DIAGNOSIS — R06.01 ORTHOPNEA: ICD-10-CM

## 2018-10-20 LAB
ALBUMIN SERPL-MCNC: 3.3 G/DL (ref 3.5–5)
ALBUMIN/GLOB SERPL: 0.6 {RATIO} (ref 1.1–2.2)
ALP SERPL-CCNC: 97 U/L (ref 45–117)
ALT SERPL-CCNC: 20 U/L (ref 12–78)
ANION GAP SERPL CALC-SCNC: 3 MMOL/L (ref 5–15)
ARTERIAL PATENCY WRIST A: YES
AST SERPL-CCNC: 19 U/L (ref 15–37)
BASE EXCESS BLDA CALC-SCNC: 16.4 MMOL/L
BASOPHILS # BLD: 0 K/UL (ref 0–0.1)
BASOPHILS NFR BLD: 0 % (ref 0–1)
BDY SITE: ABNORMAL
BILIRUB SERPL-MCNC: 0.6 MG/DL (ref 0.2–1)
BNP SERPL-MCNC: 275 PG/ML (ref 0–125)
BUN SERPL-MCNC: 11 MG/DL (ref 6–20)
BUN/CREAT SERPL: 16 (ref 12–20)
CALCIUM SERPL-MCNC: 10.3 MG/DL (ref 8.5–10.1)
CHLORIDE SERPL-SCNC: 95 MMOL/L (ref 97–108)
CK SERPL-CCNC: 32 U/L (ref 26–192)
CO2 SERPL-SCNC: 40 MMOL/L (ref 21–32)
CREAT SERPL-MCNC: 0.67 MG/DL (ref 0.55–1.02)
DIFFERENTIAL METHOD BLD: ABNORMAL
EOSINOPHIL # BLD: 0.3 K/UL (ref 0–0.4)
EOSINOPHIL NFR BLD: 4 % (ref 0–7)
ERYTHROCYTE [DISTWIDTH] IN BLOOD BY AUTOMATED COUNT: 12.3 % (ref 11.5–14.5)
FIO2 ON VENT: 36 %
GAS FLOW.O2 O2 DELIVERY SYS: 4 L/MIN
GLOBULIN SER CALC-MCNC: 5.2 G/DL (ref 2–4)
GLUCOSE SERPL-MCNC: 166 MG/DL (ref 65–100)
HCO3 BLDA-SCNC: 46 MMOL/L (ref 22–26)
HCT VFR BLD AUTO: 41.2 % (ref 35–47)
HGB BLD-MCNC: 12 G/DL (ref 11.5–16)
IMM GRANULOCYTES # BLD: 0 K/UL (ref 0–0.04)
IMM GRANULOCYTES NFR BLD AUTO: 1 % (ref 0–0.5)
LYMPHOCYTES # BLD: 1 K/UL (ref 0.8–3.5)
LYMPHOCYTES NFR BLD: 14 % (ref 12–49)
MCH RBC QN AUTO: 28.7 PG (ref 26–34)
MCHC RBC AUTO-ENTMCNC: 29.1 G/DL (ref 30–36.5)
MCV RBC AUTO: 98.6 FL (ref 80–99)
MONOCYTES # BLD: 0.8 K/UL (ref 0–1)
MONOCYTES NFR BLD: 12 % (ref 5–13)
NEUTS SEG # BLD: 5 K/UL (ref 1.8–8)
NEUTS SEG NFR BLD: 70 % (ref 32–75)
NRBC # BLD: 0 K/UL (ref 0–0.01)
NRBC BLD-RTO: 0 PER 100 WBC
PCO2 BLDA: 80 MMHG (ref 35–45)
PH BLDA: 7.38 [PH] (ref 7.35–7.45)
PLATELET # BLD AUTO: 144 K/UL (ref 150–400)
PMV BLD AUTO: 10.4 FL (ref 8.9–12.9)
PO2 BLDA: 69 MMHG (ref 80–100)
POTASSIUM SERPL-SCNC: 3.9 MMOL/L (ref 3.5–5.1)
PROT SERPL-MCNC: 8.5 G/DL (ref 6.4–8.2)
RBC # BLD AUTO: 4.18 M/UL (ref 3.8–5.2)
SAO2 % BLD: 93 % (ref 92–97)
SAO2% DEVICE SAO2% SENSOR NAME: ABNORMAL
SODIUM SERPL-SCNC: 138 MMOL/L (ref 136–145)
SPECIMEN SITE: ABNORMAL
TROPONIN I SERPL-MCNC: <0.05 NG/ML
WBC # BLD AUTO: 7.2 K/UL (ref 3.6–11)

## 2018-10-20 PROCEDURE — 83880 ASSAY OF NATRIURETIC PEPTIDE: CPT | Performed by: EMERGENCY MEDICINE

## 2018-10-20 PROCEDURE — 94660 CPAP INITIATION&MGMT: CPT

## 2018-10-20 PROCEDURE — 5A09557 ASSISTANCE WITH RESPIRATORY VENTILATION, GREATER THAN 96 CONSECUTIVE HOURS, CONTINUOUS POSITIVE AIRWAY PRESSURE: ICD-10-PCS | Performed by: EMERGENCY MEDICINE

## 2018-10-20 PROCEDURE — 65660000000 HC RM CCU STEPDOWN

## 2018-10-20 PROCEDURE — 96374 THER/PROPH/DIAG INJ IV PUSH: CPT

## 2018-10-20 PROCEDURE — 74011250636 HC RX REV CODE- 250/636: Performed by: EMERGENCY MEDICINE

## 2018-10-20 PROCEDURE — 94640 AIRWAY INHALATION TREATMENT: CPT

## 2018-10-20 PROCEDURE — 36600 WITHDRAWAL OF ARTERIAL BLOOD: CPT | Performed by: EMERGENCY MEDICINE

## 2018-10-20 PROCEDURE — 99285 EMERGENCY DEPT VISIT HI MDM: CPT

## 2018-10-20 PROCEDURE — 80053 COMPREHEN METABOLIC PANEL: CPT | Performed by: EMERGENCY MEDICINE

## 2018-10-20 PROCEDURE — 85025 COMPLETE CBC W/AUTO DIFF WBC: CPT | Performed by: EMERGENCY MEDICINE

## 2018-10-20 PROCEDURE — 93005 ELECTROCARDIOGRAM TRACING: CPT

## 2018-10-20 PROCEDURE — 74011000250 HC RX REV CODE- 250: Performed by: EMERGENCY MEDICINE

## 2018-10-20 PROCEDURE — 36415 COLL VENOUS BLD VENIPUNCTURE: CPT | Performed by: EMERGENCY MEDICINE

## 2018-10-20 PROCEDURE — 71045 X-RAY EXAM CHEST 1 VIEW: CPT

## 2018-10-20 PROCEDURE — 84484 ASSAY OF TROPONIN QUANT: CPT | Performed by: EMERGENCY MEDICINE

## 2018-10-20 PROCEDURE — 94762 N-INVAS EAR/PLS OXIMTRY CONT: CPT

## 2018-10-20 PROCEDURE — 82803 BLOOD GASES ANY COMBINATION: CPT | Performed by: EMERGENCY MEDICINE

## 2018-10-20 PROCEDURE — 82550 ASSAY OF CK (CPK): CPT | Performed by: EMERGENCY MEDICINE

## 2018-10-20 RX ORDER — AMMONIUM LACTATE 12 G/100G
LOTION TOPICAL 2 TIMES DAILY
Status: DISCONTINUED | OUTPATIENT
Start: 2018-10-21 | End: 2018-10-26 | Stop reason: HOSPADM

## 2018-10-20 RX ORDER — HEPARIN SODIUM 5000 [USP'U]/ML
7500 INJECTION, SOLUTION INTRAVENOUS; SUBCUTANEOUS EVERY 8 HOURS
Status: DISCONTINUED | OUTPATIENT
Start: 2018-10-20 | End: 2018-10-26 | Stop reason: HOSPADM

## 2018-10-20 RX ORDER — DEXTROSE 50 % IN WATER (D50W) INTRAVENOUS SYRINGE
12.5-25 AS NEEDED
Status: DISCONTINUED | OUTPATIENT
Start: 2018-10-20 | End: 2018-10-26 | Stop reason: HOSPADM

## 2018-10-20 RX ORDER — INSULIN LISPRO 100 [IU]/ML
INJECTION, SOLUTION INTRAVENOUS; SUBCUTANEOUS
Status: DISCONTINUED | OUTPATIENT
Start: 2018-10-21 | End: 2018-10-26 | Stop reason: HOSPADM

## 2018-10-20 RX ORDER — AZITHROMYCIN 250 MG/1
500 TABLET, FILM COATED ORAL DAILY
Status: COMPLETED | OUTPATIENT
Start: 2018-10-21 | End: 2018-10-23

## 2018-10-20 RX ORDER — MAGNESIUM SULFATE 100 %
4 CRYSTALS MISCELLANEOUS AS NEEDED
Status: DISCONTINUED | OUTPATIENT
Start: 2018-10-20 | End: 2018-10-26 | Stop reason: HOSPADM

## 2018-10-20 RX ORDER — FUROSEMIDE 10 MG/ML
40 INJECTION INTRAMUSCULAR; INTRAVENOUS EVERY 12 HOURS
Status: DISCONTINUED | OUTPATIENT
Start: 2018-10-20 | End: 2018-10-25

## 2018-10-20 RX ORDER — IPRATROPIUM BROMIDE AND ALBUTEROL SULFATE 2.5; .5 MG/3ML; MG/3ML
3 SOLUTION RESPIRATORY (INHALATION)
Status: DISCONTINUED | OUTPATIENT
Start: 2018-10-20 | End: 2018-10-26 | Stop reason: HOSPADM

## 2018-10-20 RX ORDER — OXYCODONE AND ACETAMINOPHEN 5; 325 MG/1; MG/1
1 TABLET ORAL
Status: DISCONTINUED | OUTPATIENT
Start: 2018-10-20 | End: 2018-10-21

## 2018-10-20 RX ORDER — IPRATROPIUM BROMIDE AND ALBUTEROL SULFATE 2.5; .5 MG/3ML; MG/3ML
3 SOLUTION RESPIRATORY (INHALATION) ONCE
Status: COMPLETED | OUTPATIENT
Start: 2018-10-20 | End: 2018-10-20

## 2018-10-20 RX ORDER — ACETAMINOPHEN 325 MG/1
650 TABLET ORAL
Status: DISCONTINUED | OUTPATIENT
Start: 2018-10-20 | End: 2018-10-26 | Stop reason: HOSPADM

## 2018-10-20 RX ORDER — SODIUM CHLORIDE 0.9 % (FLUSH) 0.9 %
5-10 SYRINGE (ML) INJECTION EVERY 8 HOURS
Status: DISCONTINUED | OUTPATIENT
Start: 2018-10-20 | End: 2018-10-26 | Stop reason: HOSPADM

## 2018-10-20 RX ORDER — NALOXONE HYDROCHLORIDE 0.4 MG/ML
0.4 INJECTION, SOLUTION INTRAMUSCULAR; INTRAVENOUS; SUBCUTANEOUS AS NEEDED
Status: DISCONTINUED | OUTPATIENT
Start: 2018-10-20 | End: 2018-10-26 | Stop reason: HOSPADM

## 2018-10-20 RX ORDER — ONDANSETRON 2 MG/ML
4 INJECTION INTRAMUSCULAR; INTRAVENOUS
Status: DISCONTINUED | OUTPATIENT
Start: 2018-10-20 | End: 2018-10-26 | Stop reason: HOSPADM

## 2018-10-20 RX ORDER — IPRATROPIUM BROMIDE AND ALBUTEROL SULFATE 2.5; .5 MG/3ML; MG/3ML
3 SOLUTION RESPIRATORY (INHALATION)
Status: DISCONTINUED | OUTPATIENT
Start: 2018-10-21 | End: 2018-10-22

## 2018-10-20 RX ORDER — SODIUM CHLORIDE 0.9 % (FLUSH) 0.9 %
5-10 SYRINGE (ML) INJECTION AS NEEDED
Status: DISCONTINUED | OUTPATIENT
Start: 2018-10-20 | End: 2018-10-26 | Stop reason: HOSPADM

## 2018-10-20 RX ORDER — GUAIFENESIN 600 MG/1
600 TABLET, EXTENDED RELEASE ORAL 2 TIMES DAILY
Status: DISCONTINUED | OUTPATIENT
Start: 2018-10-21 | End: 2018-10-26 | Stop reason: HOSPADM

## 2018-10-20 RX ADMIN — IPRATROPIUM BROMIDE AND ALBUTEROL SULFATE 3 ML: .5; 3 SOLUTION RESPIRATORY (INHALATION) at 22:57

## 2018-10-20 RX ADMIN — IPRATROPIUM BROMIDE AND ALBUTEROL SULFATE 3 ML: .5; 3 SOLUTION RESPIRATORY (INHALATION) at 21:20

## 2018-10-20 RX ADMIN — METHYLPREDNISOLONE SODIUM SUCCINATE 125 MG: 125 INJECTION, POWDER, FOR SOLUTION INTRAMUSCULAR; INTRAVENOUS at 22:55

## 2018-10-21 LAB
ANION GAP SERPL CALC-SCNC: 7 MMOL/L (ref 5–15)
ARTERIAL PATENCY WRIST A: YES
ATRIAL RATE: 98 BPM
BASE EXCESS BLDA CALC-SCNC: 13.6 MMOL/L
BDY SITE: ABNORMAL
BUN SERPL-MCNC: 13 MG/DL (ref 6–20)
BUN/CREAT SERPL: 15 (ref 12–20)
CALCIUM SERPL-MCNC: 11.1 MG/DL (ref 8.5–10.1)
CALCULATED P AXIS, ECG09: 53 DEGREES
CALCULATED R AXIS, ECG10: 84 DEGREES
CALCULATED T AXIS, ECG11: 43 DEGREES
CHLORIDE SERPL-SCNC: 94 MMOL/L (ref 97–108)
CO2 SERPL-SCNC: 34 MMOL/L (ref 21–32)
CREAT SERPL-MCNC: 0.86 MG/DL (ref 0.55–1.02)
DIAGNOSIS, 93000: NORMAL
EST. AVERAGE GLUCOSE BLD GHB EST-MCNC: 194 MG/DL
GAS FLOW.O2 O2 DELIVERY SYS: 4 L/MIN
GLUCOSE BLD STRIP.AUTO-MCNC: 269 MG/DL (ref 65–100)
GLUCOSE BLD STRIP.AUTO-MCNC: 281 MG/DL (ref 65–100)
GLUCOSE BLD STRIP.AUTO-MCNC: 329 MG/DL (ref 65–100)
GLUCOSE BLD STRIP.AUTO-MCNC: 339 MG/DL (ref 65–100)
GLUCOSE BLD STRIP.AUTO-MCNC: 417 MG/DL (ref 65–100)
GLUCOSE SERPL-MCNC: 310 MG/DL (ref 65–100)
HBA1C MFR BLD: 8.4 % (ref 4.2–6.3)
HCO3 BLDA-SCNC: 41 MMOL/L (ref 22–26)
MAGNESIUM SERPL-MCNC: 1.7 MG/DL (ref 1.6–2.4)
P-R INTERVAL, ECG05: 176 MS
PCO2 BLDA: 62 MMHG (ref 35–45)
PH BLDA: 7.44 [PH] (ref 7.35–7.45)
PO2 BLDA: 52 MMHG (ref 80–100)
POTASSIUM SERPL-SCNC: 3.9 MMOL/L (ref 3.5–5.1)
Q-T INTERVAL, ECG07: 346 MS
QRS DURATION, ECG06: 78 MS
QTC CALCULATION (BEZET), ECG08: 441 MS
SAO2 % BLD: 87 % (ref 92–97)
SAO2% DEVICE SAO2% SENSOR NAME: ABNORMAL
SERVICE CMNT-IMP: ABNORMAL
SODIUM SERPL-SCNC: 135 MMOL/L (ref 136–145)
SPECIMEN SITE: ABNORMAL
VENTRICULAR RATE, ECG03: 98 BPM

## 2018-10-21 PROCEDURE — 65270000029 HC RM PRIVATE

## 2018-10-21 PROCEDURE — 83036 HEMOGLOBIN GLYCOSYLATED A1C: CPT | Performed by: INTERNAL MEDICINE

## 2018-10-21 PROCEDURE — 36415 COLL VENOUS BLD VENIPUNCTURE: CPT | Performed by: INTERNAL MEDICINE

## 2018-10-21 PROCEDURE — 77030012879 HC MSK CPAP FLL FAC PHIL -B

## 2018-10-21 PROCEDURE — 74011250636 HC RX REV CODE- 250/636: Performed by: INTERNAL MEDICINE

## 2018-10-21 PROCEDURE — 74011000250 HC RX REV CODE- 250: Performed by: INTERNAL MEDICINE

## 2018-10-21 PROCEDURE — 80048 BASIC METABOLIC PNL TOTAL CA: CPT | Performed by: INTERNAL MEDICINE

## 2018-10-21 PROCEDURE — 82962 GLUCOSE BLOOD TEST: CPT

## 2018-10-21 PROCEDURE — 36600 WITHDRAWAL OF ARTERIAL BLOOD: CPT | Performed by: EMERGENCY MEDICINE

## 2018-10-21 PROCEDURE — 77030013140 HC MSK NEB VYRM -A

## 2018-10-21 PROCEDURE — 74011250637 HC RX REV CODE- 250/637: Performed by: INTERNAL MEDICINE

## 2018-10-21 PROCEDURE — 74011250637 HC RX REV CODE- 250/637: Performed by: HOSPITALIST

## 2018-10-21 PROCEDURE — 82803 BLOOD GASES ANY COMBINATION: CPT | Performed by: EMERGENCY MEDICINE

## 2018-10-21 PROCEDURE — 94660 CPAP INITIATION&MGMT: CPT

## 2018-10-21 PROCEDURE — 74011636637 HC RX REV CODE- 636/637: Performed by: INTERNAL MEDICINE

## 2018-10-21 PROCEDURE — 83735 ASSAY OF MAGNESIUM: CPT | Performed by: INTERNAL MEDICINE

## 2018-10-21 RX ORDER — METOPROLOL SUCCINATE 25 MG/1
25 TABLET, EXTENDED RELEASE ORAL DAILY
Status: DISCONTINUED | OUTPATIENT
Start: 2018-10-21 | End: 2018-10-26 | Stop reason: HOSPADM

## 2018-10-21 RX ORDER — GUAIFENESIN 100 MG/5ML
81 LIQUID (ML) ORAL DAILY
Status: DISCONTINUED | OUTPATIENT
Start: 2018-10-21 | End: 2018-10-26 | Stop reason: HOSPADM

## 2018-10-21 RX ORDER — POLYETHYLENE GLYCOL 3350 17 G/17G
17 POWDER, FOR SOLUTION ORAL DAILY
Status: DISCONTINUED | OUTPATIENT
Start: 2018-10-21 | End: 2018-10-26 | Stop reason: HOSPADM

## 2018-10-21 RX ORDER — OXYCODONE HYDROCHLORIDE 5 MG/1
5 TABLET ORAL
Status: DISCONTINUED | OUTPATIENT
Start: 2018-10-21 | End: 2018-10-26 | Stop reason: HOSPADM

## 2018-10-21 RX ORDER — INSULIN GLARGINE 100 [IU]/ML
35 INJECTION, SOLUTION SUBCUTANEOUS DAILY
Status: DISCONTINUED | OUTPATIENT
Start: 2018-10-21 | End: 2018-10-26 | Stop reason: HOSPADM

## 2018-10-21 RX ORDER — GLYBURIDE 5 MG/1
5 TABLET ORAL
Status: DISCONTINUED | OUTPATIENT
Start: 2018-10-21 | End: 2018-10-26 | Stop reason: HOSPADM

## 2018-10-21 RX ADMIN — FUROSEMIDE 40 MG: 10 INJECTION, SOLUTION INTRAMUSCULAR; INTRAVENOUS at 23:38

## 2018-10-21 RX ADMIN — POLYETHYLENE GLYCOL 3350 17 G: 17 POWDER, FOR SOLUTION ORAL at 08:21

## 2018-10-21 RX ADMIN — Medication 10 ML: at 13:08

## 2018-10-21 RX ADMIN — OXYCODONE HYDROCHLORIDE AND ACETAMINOPHEN 1 TABLET: 5; 325 TABLET ORAL at 17:46

## 2018-10-21 RX ADMIN — IPRATROPIUM BROMIDE AND ALBUTEROL SULFATE 3 ML: .5; 3 SOLUTION RESPIRATORY (INHALATION) at 06:02

## 2018-10-21 RX ADMIN — INSULIN LISPRO 5 UNITS: 100 INJECTION, SOLUTION INTRAVENOUS; SUBCUTANEOUS at 23:38

## 2018-10-21 RX ADMIN — AZITHROMYCIN 500 MG: 250 TABLET, FILM COATED ORAL at 08:20

## 2018-10-21 RX ADMIN — INSULIN LISPRO 16 UNITS: 100 INJECTION, SOLUTION INTRAVENOUS; SUBCUTANEOUS at 13:07

## 2018-10-21 RX ADMIN — Medication 10 ML: at 06:04

## 2018-10-21 RX ADMIN — FUROSEMIDE 40 MG: 10 INJECTION, SOLUTION INTRAMUSCULAR; INTRAVENOUS at 08:20

## 2018-10-21 RX ADMIN — Medication 5 ML: at 06:04

## 2018-10-21 RX ADMIN — INSULIN GLARGINE 35 UNITS: 100 INJECTION, SOLUTION SUBCUTANEOUS at 08:21

## 2018-10-21 RX ADMIN — FUROSEMIDE 40 MG: 10 INJECTION, SOLUTION INTRAMUSCULAR; INTRAVENOUS at 03:15

## 2018-10-21 RX ADMIN — HEPARIN SODIUM 7500 UNITS: 5000 INJECTION INTRAVENOUS; SUBCUTANEOUS at 03:11

## 2018-10-21 RX ADMIN — GUAIFENESIN 600 MG: 600 TABLET, EXTENDED RELEASE ORAL at 08:20

## 2018-10-21 RX ADMIN — HEPARIN SODIUM 7500 UNITS: 5000 INJECTION INTRAVENOUS; SUBCUTANEOUS at 08:21

## 2018-10-21 RX ADMIN — OXYCODONE HYDROCHLORIDE AND ACETAMINOPHEN 1 TABLET: 5; 325 TABLET ORAL at 08:15

## 2018-10-21 RX ADMIN — METHYLPREDNISOLONE SODIUM SUCCINATE 40 MG: 125 INJECTION, POWDER, FOR SOLUTION INTRAMUSCULAR; INTRAVENOUS at 23:39

## 2018-10-21 RX ADMIN — Medication 10 ML: at 23:45

## 2018-10-21 RX ADMIN — GUAIFENESIN 600 MG: 600 TABLET, EXTENDED RELEASE ORAL at 17:46

## 2018-10-21 RX ADMIN — INSULIN LISPRO 2 UNITS: 100 INJECTION, SOLUTION INTRAVENOUS; SUBCUTANEOUS at 17:47

## 2018-10-21 RX ADMIN — METHYLPREDNISOLONE SODIUM SUCCINATE 40 MG: 125 INJECTION, POWDER, FOR SOLUTION INTRAMUSCULAR; INTRAVENOUS at 06:03

## 2018-10-21 RX ADMIN — ASPIRIN 81 MG CHEWABLE TABLET 81 MG: 81 TABLET CHEWABLE at 08:20

## 2018-10-21 RX ADMIN — OXYCODONE HYDROCHLORIDE 5 MG: 5 TABLET ORAL at 23:37

## 2018-10-21 RX ADMIN — METOPROLOL SUCCINATE 25 MG: 50 TABLET, EXTENDED RELEASE ORAL at 08:20

## 2018-10-21 RX ADMIN — IPRATROPIUM BROMIDE AND ALBUTEROL SULFATE 3 ML: .5; 3 SOLUTION RESPIRATORY (INHALATION) at 17:45

## 2018-10-21 RX ADMIN — HEPARIN SODIUM 7500 UNITS: 5000 INJECTION INTRAVENOUS; SUBCUTANEOUS at 23:38

## 2018-10-21 RX ADMIN — GLYBURIDE 5 MG: 5 TABLET ORAL at 10:15

## 2018-10-21 RX ADMIN — IPRATROPIUM BROMIDE AND ALBUTEROL SULFATE 3 ML: .5; 3 SOLUTION RESPIRATORY (INHALATION) at 13:08

## 2018-10-21 RX ADMIN — INSULIN LISPRO 7 UNITS: 100 INJECTION, SOLUTION INTRAVENOUS; SUBCUTANEOUS at 08:21

## 2018-10-21 RX ADMIN — OXYCODONE HYDROCHLORIDE AND ACETAMINOPHEN 1 TABLET: 5; 325 TABLET ORAL at 13:07

## 2018-10-21 NOTE — ED TRIAGE NOTES
Difficulty breathing x1 week. Patient was recently diagnosed with pneumonia and states \"I think I have a lot of fluid. I usually wear 6L of oxygen, but right now I am on 4L because that's all I can tolerate. \"  
 
Per EMS, patient has lesions on breast from breast cancer that she isn't being treated for.

## 2018-10-21 NOTE — PROGRESS NOTES
Patient states that she hurts and that she would like Roxicodone. She states that she takes it at home and won't take anymore tylenol. Hospitalist paged.

## 2018-10-21 NOTE — ED NOTES
Pt moved from bed 20 to 27. Pt report given to Debbie GREEN. [email or web address] via [place]. Pt requesting food. Will check orders for continued care.

## 2018-10-21 NOTE — H&P
Hospitalist Admission NoteNAME: Sai Penn :  1970 MRN:  [id number] Date/Time:  10/20/2018 10:42 PM 
 
Patient PCP: Matthew Art NP 
_____________________________________________________________________ Given the patient's current clinical presentation, I have a high level of concern for decompensation if discharged from the emergency department. Complex decision making was performed, which includes reviewing the patient's available past medical records, laboratory results, and x-ray films. My assessment of this patient's clinical condition and my plan of care is as follows. Assessment / Plan: 
 
Acute on chronic hypoxemic and hypercarbic respiratory failure Acute on chronic diastolic heart failure COPD exacerbation Obstructive sleep apnea/obesity hypoventilation on Trilogy  
-CXR nothing acute 
-she stopped using her Trilogy 3 days ago as she could not tolerate having it on when laying down. Worsening leg edema and orthopnea / PND 
-start IV lasix 
-systemic steroids, scheduled duonebs, empiric abx 
-continue BIPAP overnight and try wean off in AM.  She likely will feel better after diuresis 
  
Chronic LE lymphedema / venous stasis 
-continue lac hydrin lotion / cream 
  
CAD, s/p stents HTN 
-continue asa, toprol 
  
DM 
-continue glyburide, lantus with SSI prn 
  
Ex smoker Code Status:   Full code Surrogate Decision Maker: DVT Prophylaxis:  Heparin SQ 
GI Prophylaxis: not indicated Subjective: CHIEF COMPLAINT:   SOB HISTORY OF PRESENT ILLNESS:    
Jaylen Cruz is a 57 y.o. female with a history of COPD, GEN/OHS, CHF and DM who presents with progressive SOB. Patient has been feeling short winded for a week now. She endorses cough, beige sputum, worsening leg swelling, orthopnea and nocturnal dyspnea.   She is compliant with her lasix, inhaler and Trilogy but she had to stop using her Trilogy this last 3 days when she felt more SOB with it on. She denies CP, fevers, chills, NV or D.   EMS was dispatched and BIPAP support initiated in the ER due to persistent hypoxemia on oxygen. We were asked to admit for work up and evaluation of the above problems. Past Medical History:  
Diagnosis Date  Arthritis  Asthma  Breast lump  CAD (coronary artery disease)  Congestive heart failure ([facility]  COPD (chronic obstructive pulmonary disease) ([facility])  Diabetes ([facility]  Hypertension  Morbid obesity with BMI of 70 and over, adult ([facility]  NSTEMI (non-ST elevated myocardial infarction) ([facility]  SVT (supraventricular tachycardia) ([facility]) Past Surgical History:  
Procedure Laterality Date  CARDIAC SURG PROCEDURE UNLIST Stents  HX  SECTION    
 HX ORTHOPAEDIC    
 HX OTHER SURGICAL    
 cyst removed from back Social History Tobacco Use  Smoking status: Current Every Day Smoker Packs/day: 0.25 Types: Cigarettes  Smokeless tobacco: Never Used  Tobacco comment: Patient states \"I  aint smoking no more d*mn cigarettes after yesterday\" 2018 Substance Use Topics  Alcohol use: No  
  
 
Family History Problem Relation Age of Onset  Heart Disease Mother  Heart Disease Father  Heart Disease Sister  Breast Cancer Maternal Grandmother  Breast Cancer Paternal Grandmother No Known Allergies Prior to Admission medications Medication Sig Start Date End Date Taking? Authorizing Provider  
ketoconazole (NIZORAL) 2 % topical cream Apply  to affected area daily. 18   Astrid Almazan NP  
nystatin (MYCOSTATIN) powder Apply  to affected area two (2) times a day. 18   Astrid Almazan NP  
guaiFENesin ER (MUCINEX) 600 mg ER tablet Take 1 Tab by mouth two (2) times a day.  18   Astrid Almazan NP  
lidocaine (LIDODERM) 5 % 2 Patches by TransDERmal route every twenty-four (24) hours. Apply patch to the affected area for 12 hours a day and remove for 12 hours a day. 8/23/18   Astrid Almazan NP  
oxyCODONE IR (ROXICODONE) 5 mg immediate release tablet Take 1 Tab by mouth every four (4) hours as needed (pain 6-10). Max Daily Amount: 30 mg. 8/23/18   Astrid Almazan NP  
polyethylene glycol (MIRALAX) 17 gram packet Take 1 Packet by mouth daily. 8/24/18   Astrid Almazan NP  
glucose blood VI test strips (EASYGLUCO TEST) strip by Does Not Apply route ACB/HS. 8/23/18   Astrid Almazan NP  
albuterol (VENTOLIN HFA) 90 mcg/actuation inhaler Take 2 Puffs by inhalation every six (6) hours as needed for Wheezing. Provider, Historical  
cyclobenzaprine (FLEXERIL) 5 mg tablet Take 5 mg by mouth two (2) times a day. flexeriol    Provider, Historical  
metoprolol succinate (TOPROL-XL) 25 mg XL tablet Take  by mouth two (2) times a day. Other, MD Landon  
furosemide (LASIX) 40 mg tablet Take 40 mg by mouth two (2) times a day. Indications: Peripheral Edema due to Chronic Heart Failure    Other, MD Landon  
albuterol (PROVENTIL VENTOLIN) 2.5 mg /3 mL (0.083 %) nebulizer solution 3 mL by Nebulization route every four (4) hours as needed for Wheezing. 2/3/18   Latricia Richard MD  
aspirin 81 mg chewable tablet Take 81 mg by mouth daily. Provider, Historical  
insulin glargine (LANTUS) 100 unit/mL injection 15 Units by SubCUTAneous route daily. Provider, Historical  
hydrOXYzine pamoate (VISTARIL) 50 mg capsule Take 50 mg by mouth every eight (8) hours as needed for Itching. Indications: Pruritus of Skin    Provider, Historical  
fluticasone (FLONASE) 50 mcg/actuation nasal spray 2 Sprays by Both Nostrils route daily. 9/9/17   Shari Gallardo., MD  
lovastatin (MEVACOR) 40 mg tablet Take 1 Tab by mouth nightly. 1/14/16   Piotr Latdavid SIMPSON NP  
glyBURIDE (DIABETA) 5 mg tablet Take 5 mg by mouth Daily (before breakfast).     Provider, Historical  
 cetirizine (ZYRTEC) 10 mg tablet Take 10 mg by mouth daily as needed for Allergies. Provider, Historical  
ammonium lactate (LAC-HYDRIN) 12 % topical cream rub in to affected area well 11/21/14   Marquis Carter Song MD  
nitroglycerin (NITROSTAT) 0.4 mg SL tablet 1 Tab by SubLINGual route every five (5) minutes as needed for Chest Pain (call 911 if not relieved by 3). 9/12/13   Amaya Joel NP  
 
 
REVIEW OF SYSTEMS:    
 
Total of 12 systems reviewed as follows:   
   POSITIVE= underlined text  Negative = text not underlined General:  fever, chills, sweats, generalized weakness, weight loss/gain,  
   loss of appetite Eyes:    blurred vision, eye pain, loss of vision, double vision ENT:    rhinorrhea, pharyngitis Respiratory:   cough, sputum production, SOB, RAZO, wheezing, pleuritic pain  
Cardiology:   chest pain, palpitations, orthopnea, PND, edema, syncope Gastrointestinal:  abdominal pain , N/V, diarrhea, dysphagia, constipation, bleeding Genitourinary:  frequency, urgency, dysuria, hematuria, incontinence Muskuloskeletal :  arthralgia, myalgia, back pain Hematology:  easy bruising, nose or gum bleeding, lymphadenopathy Dermatological: rash, ulceration, pruritis, color change / jaundice Endocrine:   hot flashes or polydipsia Neurological:  headache, dizziness, confusion, focal weakness, paresthesia, Speech difficulties, memory loss, gait difficulty Psychological: Feelings of anxiety, depression, agitation Objective: VITALS:   
Visit Vitals /73 (BP 1 Location: Left arm, BP Patient Position: Sitting) Pulse 88 Temp 97.5 °F (36.4 °C) Resp 24 Ht 5' 7\" (1.702 m) Wt (!) 201.9 kg (445 lb) SpO2 99% BMI 69.70 kg/m² PHYSICAL EXAM: 
 
General:    Alert, cooperative, on BIPAP support appears stated age. HEENT: Atraumatic, anicteric sclerae, pink conjunctivae No oral ulcers, mucosa moist, throat clear, dentition fair Neck:  Supple, symmetrical,  thyroid: non tender Lungs:   Diminished BS, Bilateral rhonchi Chest wall:  No tenderness  No Accessory muscle use. Heart:   Regular  rhythm,   ++ bilateral leg edema Abdomen:   Soft, non-tender. Not distended. Bowel sounds normal 
Extremities: No cyanosis. No clubbing,   
Skin:     Not pale. Not Jaundiced (+) bilateral lichenified changes with fissuring Psych:  Good insight. Not depressed. Not anxious or agitated. Neurologic: EOMs intact. No facial asymmetry. No aphasia or slurred speech. Symmetrical strength, Sensation grossly intact. Alert and oriented X 4.  
 
_______________________________________________________________________ Care Plan discussed with: 
  Comments Patient x Family RN Care Manager Consultant:     
_______________________________________________________________________ Expected  Disposition:  
Home with Family x HH/PT/OT/RN   
SNF/LTC   
LUIS A   
________________________________________________________________________ TOTAL TIME:     Minutes Critical Care Provided  55   Minutes non procedure based I have provided   55     minutes of critical care time. During this entire length of time I was immediately available to the patient. The reason for providing this level of medical care was due to a critical illness that impaired one or more vital organ systems, such that there was a high probability of imminent or life threatening deterioration in the patient's condition. This care involved high complexity decision making which includes reviewing the patient's past medical records, current laboratory results, and actual Xray films in order to assess, support vital system function, and to treat this degree of vital organ system failure, and to prevent further life threatening deterioration of the patients condition. I have also discussed this case with the involved ED physician Comments x Reviewed previous records  
>50% of visit spent in counseling and coordination of care  Discussion with patient and/or family and questions answered Given the patient's current clinical presentation, I have a high level of concern for decompensation if discharged from the ED. Complex decision making was performed which includes reviewing the patient's available past medical records, laboratory results, and Xray films. I have also directly communicated my plan and discussed this case with the involved ED physician.  
 
____________________________________________________________________ Bonnie Ventura MD 
 
Procedures: see electronic medical records for all procedures/Xrays and details which were not copied into this note but were reviewed prior to creation of Plan. LAB DATA REVIEWED:   
Recent Results (from the past 24 hour(s)) EKG, 12 LEAD, INITIAL Collection Time: 10/20/18  8:55 PM  
Result Value Ref Range Ventricular Rate 98 BPM  
 Atrial Rate 98 BPM  
 P-R Interval 176 ms QRS Duration 78 ms Q-T Interval 346 ms  
 QTC Calculation (Bezet) 441 ms Calculated P Axis 53 degrees Calculated R Axis 84 degrees Calculated T Axis 43 degrees Diagnosis Sinus rhythm with premature supraventricular complexes Low voltage QRS When compared with ECG of 24-SEP-2018 19:56, 
premature supraventricular complexes are now present BLOOD GAS, ARTERIAL Collection Time: 10/20/18  9:00 PM  
Result Value Ref Range pH 7.38 7.35 - 7.45    
 PCO2 80 (H) 35.0 - 45.0 mmHg PO2 69 (L) 80 - 100 mmHg O2 SAT 93 92 - 97 % BICARBONATE 46 (H) 22 - 26 mmol/L  
 BASE EXCESS 16.4 mmol/L  
 O2 METHOD NASAL O2    
 O2 FLOW RATE 4.00 L/min FIO2 36 % Sample source ARTERIAL    
 SITE RIGHT RADIAL PARUL'S TEST YES    
CBC WITH AUTOMATED DIFF Collection Time: 10/20/18  9:14 PM  
Result Value Ref Range WBC 7.2 3.6 - 11.0 K/uL  
 RBC 4.18 3.80 - 5.20 M/uL  
 HGB 12.0 11.5 - 16.0 g/dL HCT 41.2 35.0 - 47.0 % MCV 98.6 80.0 - 99.0 FL  
 MCH 28.7 26.0 - 34.0 PG  
 MCHC 29.1 (L) 30.0 - 36.5 g/dL  
 RDW 12.3 11.5 - 14.5 % PLATELET 790 (L) 325 - 400 K/uL MPV 10.4 8.9 - 12.9 FL  
 NRBC 0.0 0  WBC ABSOLUTE NRBC 0.00 0.00 - 0.01 K/uL NEUTROPHILS 70 32 - 75 % LYMPHOCYTES 14 12 - 49 % MONOCYTES 12 5 - 13 % EOSINOPHILS 4 0 - 7 % BASOPHILS 0 0 - 1 % IMMATURE GRANULOCYTES 1 (H) 0.0 - 0.5 % ABS. NEUTROPHILS 5.0 1.8 - 8.0 K/UL  
 ABS. LYMPHOCYTES 1.0 0.8 - 3.5 K/UL  
 ABS. MONOCYTES 0.8 0.0 - 1.0 K/UL  
 ABS. EOSINOPHILS 0.3 0.0 - 0.4 K/UL  
 ABS. BASOPHILS 0.0 0.0 - 0.1 K/UL  
 ABS. IMM. GRANS. 0.0 0.00 - 0.04 K/UL  
 DF AUTOMATED METABOLIC PANEL, COMPREHENSIVE Collection Time: 10/20/18  9:14 PM  
Result Value Ref Range Sodium 138 136 - 145 mmol/L Potassium 3.9 3.5 - 5.1 mmol/L Chloride 95 (L) 97 - 108 mmol/L  
 CO2 40 (H) 21 - 32 mmol/L Anion gap 3 (L) 5 - 15 mmol/L Glucose 166 (H) 65 - 100 mg/dL BUN 11 6 - 20 MG/DL Creatinine 0.67 0.55 - 1.02 MG/DL  
 BUN/Creatinine ratio 16 12 - 20 GFR est AA >60 >60 ml/min/1.73m2 GFR est non-AA >60 >60 ml/min/1.73m2 Calcium 10.3 (H) 8.5 - 10.1 MG/DL Bilirubin, total 0.6 0.2 - 1.0 MG/DL  
 ALT (SGPT) 20 12 - 78 U/L  
 AST (SGOT) 19 15 - 37 U/L Alk. phosphatase 97 45 - 117 U/L Protein, total 8.5 (H) 6.4 - 8.2 g/dL Albumin 3.3 (L) 3.5 - 5.0 g/dL Globulin 5.2 (H) 2.0 - 4.0 g/dL A-G Ratio 0.6 (L) 1.1 - 2.2 CK W/ REFLX CKMB Collection Time: 10/20/18  9:14 PM  
Result Value Ref Range CK 32 26 - 192 U/L  
TROPONIN I Collection Time: 10/20/18  9:14 PM  
Result Value Ref Range Troponin-I, Qt. <0.05 <0.05 ng/mL NT-PRO BNP Collection Time: 10/20/18  9:14 PM  
Result Value Ref Range  NT pro- (H) 0 - 125 PG/ML

## 2018-10-21 NOTE — ED NOTES
Assumed care of pt from [place], RN at bedside with verbal report consisting of Situation, Background, Assessment, and Recommendations (SBAR). Pt is A&O x 4. Pt resting comfortably on the stretcher in a position of comfort. Call bell within reach. Side rails x 2. Cardiac monitor x 3. Stretcher locked in the lowest position. Concerns and questions addressed at this time. Pt in no acute distress at this the time. Will continue to monitor.

## 2018-10-21 NOTE — ED PROVIDER NOTES
EMERGENCY DEPARTMENT HISTORY AND PHYSICAL EXAM 
 
 
Date: 10/20/2018 Patient Name: Saud Palacios History of Presenting Illness Chief Complaint Patient presents with  Shortness of Breath History Provided By: Patient HPI: Saud Palacios, 57 y.o. female with PMHx significant for NSTEMI / SVT . CHF / COPD / DM / HTN / asthma / obesity, presents via EMS to the ED with cc of worsening SOB with associated productive cough and chills x 5 days. Pt endorses an inability to sleep due to her sxs. Pt discloses she was admitted a few weeks ago for PNA and fluid in her lungs. Pt reports she is on 6L of oxygen at home. Pt reports she has a nebulizer at home. Pt endorses she is compliant with her BP medication. Pt additionally discloses she is on 40 mg of Lasix daily. Pt denies recent abx use. Pt specifically denies HA, CP, abdominal pain, fever, nausea, vomiting, diarrhea, or urinary sxs. There are no other complaints, changes, or physical findings at this time. PCP: Jackelyn Craft NP Current Facility-Administered Medications Medication Dose Route Frequency Provider Last Rate Last Dose  albuterol-ipratropium (DUO-NEB) 2.5 MG-0.5 MG/3 ML  3 mL Nebulization ONCE Estrellita Phalen, MD      
 methylPREDNISolone (PF) (SOLU-MEDROL) injection 125 mg  125 mg IntraVENous NOW Estrellita Phalen, MD      
 
Current Outpatient Medications Medication Sig Dispense Refill  ketoconazole (NIZORAL) 2 % topical cream Apply  to affected area daily. 15 g 0  
 nystatin (MYCOSTATIN) powder Apply  to affected area two (2) times a day. 1 Bottle 0  
 guaiFENesin ER (MUCINEX) 600 mg ER tablet Take 1 Tab by mouth two (2) times a day. 14 Tab 0  
 lidocaine (LIDODERM) 5 % 2 Patches by TransDERmal route every twenty-four (24) hours. Apply patch to the affected area for 12 hours a day and remove for 12 hours a day.  10 Each 0  
 oxyCODONE IR (ROXICODONE) 5 mg immediate release tablet Take 1 Tab by mouth every four (4) hours as needed (pain 6-10). Max Daily Amount: 30 mg. 15 Tab 0  
 polyethylene glycol (MIRALAX) 17 gram packet Take 1 Packet by mouth daily. 30 Packet 0  
 glucose blood VI test strips (EASYGLUCO TEST) strip by Does Not Apply route ACB/HS. 100 Strip 0  
 albuterol (VENTOLIN HFA) 90 mcg/actuation inhaler Take 2 Puffs by inhalation every six (6) hours as needed for Wheezing.  cyclobenzaprine (FLEXERIL) 5 mg tablet Take 5 mg by mouth two (2) times a day. flexeriol  metoprolol succinate (TOPROL-XL) 25 mg XL tablet Take  by mouth two (2) times a day.  furosemide (LASIX) 40 mg tablet Take 40 mg by mouth two (2) times a day. Indications: Peripheral Edema due to Chronic Heart Failure  albuterol (PROVENTIL VENTOLIN) 2.5 mg /3 mL (0.083 %) nebulizer solution 3 mL by Nebulization route every four (4) hours as needed for Wheezing. 24 Each 0  
 aspirin 81 mg chewable tablet Take 81 mg by mouth daily.  insulin glargine (LANTUS) 100 unit/mL injection 15 Units by SubCUTAneous route daily.  hydrOXYzine pamoate (VISTARIL) 50 mg capsule Take 50 mg by mouth every eight (8) hours as needed for Itching. Indications: Pruritus of Skin  fluticasone (FLONASE) 50 mcg/actuation nasal spray 2 Sprays by Both Nostrils route daily. 1 Bottle 0  
 lovastatin (MEVACOR) 40 mg tablet Take 1 Tab by mouth nightly. 30 Tab 6  
 glyBURIDE (DIABETA) 5 mg tablet Take 5 mg by mouth Daily (before breakfast).  cetirizine (ZYRTEC) 10 mg tablet Take 10 mg by mouth daily as needed for Allergies.  ammonium lactate (LAC-HYDRIN) 12 % topical cream rub in to affected area well 280 g 1  
 nitroglycerin (NITROSTAT) 0.4 mg SL tablet 1 Tab by SubLINGual route every five (5) minutes as needed for Chest Pain (call 911 if not relieved by 3). 25 Tab 2 Past History Past Medical History: 
Past Medical History:  
Diagnosis Date  Arthritis  Asthma  Breast lump  CAD (coronary artery disease)  Congestive heart failure ([facility]  COPD (chronic obstructive pulmonary disease) ([facility])  Diabetes ([facility]  Hypertension  Morbid obesity with BMI of 70 and over, adult ([facility]  NSTEMI (non-ST elevated myocardial infarction) ([facility]  SVT (supraventricular tachycardia) ([facility]) Past Surgical History: 
Past Surgical History:  
Procedure Laterality Date  CARDIAC SURG PROCEDURE UNLIST Stents  HX  SECTION    
 HX ORTHOPAEDIC    
 HX OTHER SURGICAL    
 cyst removed from back Family History: 
Family History Problem Relation Age of Onset  Heart Disease Mother  Heart Disease Father  Heart Disease Sister  Breast Cancer Maternal Grandmother  Breast Cancer Paternal Grandmother Social History: 
Social History Tobacco Use  Smoking status: Current Every Day Smoker Packs/day: 0.25 Types: Cigarettes  Smokeless tobacco: Never Used  Tobacco comment: Patient states \"I  aint smoking no more d*mn cigarettes after yesterday\" 2018 Substance Use Topics  Alcohol use: No  
 Drug use: No  
 
 
Allergies: 
No Known Allergies Review of Systems Review of Systems Constitutional: Positive for chills. Negative for fever. HENT: Negative. Negative for congestion and rhinorrhea. Respiratory: Positive for cough (productive) and shortness of breath. Negative for chest tightness and wheezing. Cardiovascular: Negative. Negative for chest pain and palpitations. Gastrointestinal: Negative. Negative for abdominal pain, constipation, nausea and vomiting. Endocrine: Negative. Genitourinary: Negative. Negative for decreased urine volume, flank pain, hematuria and pelvic pain. Musculoskeletal: Negative. Negative for back pain and neck pain. Skin: Negative. Negative for color change, pallor and rash. Neurological: Negative.   Negative for dizziness, seizures, weakness, numbness and headaches. Hematological: Negative. Negative for adenopathy. Psychiatric/Behavioral: Negative. All other systems reviewed and are negative. Physical Exam  
Physical Exam  
Constitutional: She appears well-developed and well-nourished. No distress. HENT:  
Head: Normocephalic. Eyes: Pupils are equal, round, and reactive to light. Neck: Normal range of motion. Neck supple. No JVD present. No tracheal deviation present. No thyromegaly present. Cardiovascular: Normal rate and regular rhythm. Exam reveals no gallop. No murmur heard. Pulmonary/Chest: No stridor. No respiratory distress. She has wheezes. She has no rales. She exhibits no tenderness. Abdominal: Soft. Bowel sounds are normal. She exhibits no distension and no mass. There is no tenderness. There is no rebound and no guarding. Musculoskeletal: She exhibits edema. Skin: Skin is warm. She is not diaphoretic. Vitals reviewed. Diagnostic Study Results Labs - Recent Results (from the past 12 hour(s)) EKG, 12 LEAD, INITIAL Collection Time: 10/20/18  8:55 PM  
Result Value Ref Range Ventricular Rate 98 BPM  
 Atrial Rate 98 BPM  
 P-R Interval 176 ms QRS Duration 78 ms Q-T Interval 346 ms  
 QTC Calculation (Bezet) 441 ms Calculated P Axis 53 degrees Calculated R Axis 84 degrees Calculated T Axis 43 degrees Diagnosis Sinus rhythm with premature supraventricular complexes Low voltage QRS When compared with ECG of 24-SEP-2018 19:56, 
premature supraventricular complexes are now present BLOOD GAS, ARTERIAL Collection Time: 10/20/18  9:00 PM  
Result Value Ref Range pH 7.38 7.35 - 7.45    
 PCO2 80 (H) 35.0 - 45.0 mmHg PO2 69 (L) 80 - 100 mmHg O2 SAT 93 92 - 97 % BICARBONATE 46 (H) 22 - 26 mmol/L  
 BASE EXCESS 16.4 mmol/L  
 O2 METHOD NASAL O2    
 O2 FLOW RATE 4.00 L/min FIO2 36 % Sample source ARTERIAL    
 SITE RIGHT RADIAL  PARUL'S TEST YES    
 CBC WITH AUTOMATED DIFF Collection Time: 10/20/18  9:14 PM  
Result Value Ref Range WBC 7.2 3.6 - 11.0 K/uL  
 RBC 4.18 3.80 - 5.20 M/uL  
 HGB 12.0 11.5 - 16.0 g/dL HCT 41.2 35.0 - 47.0 % MCV 98.6 80.0 - 99.0 FL  
 MCH 28.7 26.0 - 34.0 PG  
 MCHC 29.1 (L) 30.0 - 36.5 g/dL  
 RDW 12.3 11.5 - 14.5 % PLATELET 503 (L) 589 - 400 K/uL MPV 10.4 8.9 - 12.9 FL  
 NRBC 0.0 0  WBC ABSOLUTE NRBC 0.00 0.00 - 0.01 K/uL NEUTROPHILS 70 32 - 75 % LYMPHOCYTES 14 12 - 49 % MONOCYTES 12 5 - 13 % EOSINOPHILS 4 0 - 7 % BASOPHILS 0 0 - 1 % IMMATURE GRANULOCYTES 1 (H) 0.0 - 0.5 % ABS. NEUTROPHILS 5.0 1.8 - 8.0 K/UL  
 ABS. LYMPHOCYTES 1.0 0.8 - 3.5 K/UL  
 ABS. MONOCYTES 0.8 0.0 - 1.0 K/UL  
 ABS. EOSINOPHILS 0.3 0.0 - 0.4 K/UL  
 ABS. BASOPHILS 0.0 0.0 - 0.1 K/UL  
 ABS. IMM. GRANS. 0.0 0.00 - 0.04 K/UL  
 DF AUTOMATED METABOLIC PANEL, COMPREHENSIVE Collection Time: 10/20/18  9:14 PM  
Result Value Ref Range Sodium 138 136 - 145 mmol/L Potassium 3.9 3.5 - 5.1 mmol/L Chloride 95 (L) 97 - 108 mmol/L  
 CO2 40 (H) 21 - 32 mmol/L Anion gap 3 (L) 5 - 15 mmol/L Glucose 166 (H) 65 - 100 mg/dL BUN 11 6 - 20 MG/DL Creatinine 0.67 0.55 - 1.02 MG/DL  
 BUN/Creatinine ratio 16 12 - 20 GFR est AA >60 >60 ml/min/1.73m2 GFR est non-AA >60 >60 ml/min/1.73m2 Calcium 10.3 (H) 8.5 - 10.1 MG/DL Bilirubin, total 0.6 0.2 - 1.0 MG/DL  
 ALT (SGPT) 20 12 - 78 U/L  
 AST (SGOT) 19 15 - 37 U/L Alk. phosphatase 97 45 - 117 U/L Protein, total 8.5 (H) 6.4 - 8.2 g/dL Albumin 3.3 (L) 3.5 - 5.0 g/dL Globulin 5.2 (H) 2.0 - 4.0 g/dL A-G Ratio 0.6 (L) 1.1 - 2.2 CK W/ REFLX CKMB Collection Time: 10/20/18  9:14 PM  
Result Value Ref Range CK 32 26 - 192 U/L  
TROPONIN I Collection Time: 10/20/18  9:14 PM  
Result Value Ref Range Troponin-I, Qt. <0.05 <0.05 ng/mL NT-PRO BNP Collection Time: 10/20/18  9:14 PM  
Result Value Ref Range NT pro- (H) 0 - 125 PG/ML Radiologic Studies -  
XR CHEST PORT    (Results Pending) CT Results  (Last 48 hours) None CXR Results  (Last 48 hours) None Medical Decision Making I am the first provider for this patient. I reviewed the vital signs, available nursing notes, past medical history, past surgical history, family history and social history. Vital Signs-Reviewed the patient's vital signs. Patient Vitals for the past 12 hrs: 
 Temp Pulse Resp BP SpO2  
10/20/18 2031 97.5 °F (36.4 °C) 88 24 111/73 99 % EKG interpretation: (Preliminary) 20:55 Rhythm: premature supraventricular complexes; and regular . Rate (approx.): 98; Axis: normal; CA interval: normal; QRS interval: normal ; ST/T wave: normal. 
Written by Olga Gutierrez, ED Scribe, as dictated by Rhett Haines MD. Records Reviewed: Nursing Notes, Old Medical Records, Ambulance Run Sheet, Previous Radiology Studies and Previous Laboratory Studies Provider Notes (Medical Decision Making): DDx: COPD, CHF, respiratory failure, PNA Impression/Plan: Hx of CHF, oxygen dependent returns to ER complaining of worsening SOB. Recent admission for PNA. Plan will be to obtain labs including blood gas to determine final disposition. ED Course:  
Initial assessment performed. The patients presenting problems have been discussed, and they are in agreement with the care plan formulated and outlined with them. I have encouraged them to ask questions as they arise throughout their visit. Medications  
albuterol-ipratropium (DUO-NEB) 2.5 MG-0.5 MG/3 ML (not administered) methylPREDNISolone (PF) (SOLU-MEDROL) injection 125 mg (not administered)  
albuterol-ipratropium (DUO-NEB) 2.5 MG-0.5 MG/3 ML (3 mL Nebulization Given 10/20/18 2120) CONSULT NOTE:  
10:30 PM 
Rhett Haines MD spoke with Farida Schmidt MD, Specialty: Hospitalist 
 Discussed pt's hx, disposition, and available diagnostic and imaging results. Reviewed care plans. Consultant will evaluate pt for admission. Written by SHRUTHI Perdomo, as dictated by Donald Perez MD. Critical Care Time: CRITICAL CARE NOTE : 
 
10:35 PM 
 
 
IMPENDING DETERIORATION -Respiratory, Cardiovascular, Renal and Hepatic ASSOCIATED RISK FACTORS - Hypoxia MANAGEMENT- Bedside Assessment and Supervision of Care INTERPRETATION -  Xrays, Blood Gases, ECG and Blood Pressure INTERVENTIONS - vent mngmt CASE REVIEW - Hospitalist and Nursing TREATMENT RESPONSE -Stable PERFORMED BY - Self NOTES   : 
 
 
I have spent 35 minutes of critical care time involved in lab review, consultations with specialist, family decision- making, bedside attention and documentation. During this entire length of time I was immediately available to the patient . Donald Perez MD 
 
Disposition: 
Admit Note: 
10:37 PM 
Pt is being admitted by Dr. Maico Yuen. The results of their tests and reason(s) for their admission have been discussed with pt and/or available family. They convey agreement and understanding for the need to be admitted and for admission diagnosis. Diagnosis Clinical Impression: 1. Acute exacerbation of chronic obstructive pulmonary disease (COPD) ([facility] 2. Acute respiratory failure with hypoxia and hypercapnia (HCC) Attestations: This note is prepared by Anjana Chapman, acting as Scribe for Donald Perez MD. Donald Perez MD: The scribe's documentation has been prepared under my direction and personally reviewed by me in its entirety. I confirm that the note above accurately reflects all work, treatment, procedures, and medical decision making performed by me.

## 2018-10-21 NOTE — PROGRESS NOTES
Pharmacy  Heparin Monitoring Indication: DVT prophylaxis Dose: 5000 units subcutaneously every 8 hr 
 
Initial dosing Weight: 201.9 kg (BMI 69) Labs: 
Recent Labs 10/20/18 
2114 HGB 12.0 * CrCl:  >100 ml/min Impression/Plan:  
? Change to 7500 units subcutaneously q8hr per protocol for obese patients with BMI >40 Thanks, Jaclyn Martinez, PHARMD

## 2018-10-21 NOTE — PROGRESS NOTES
Hospitalist Progress Note NAME: Tamika Pacheco :  1970 MRN:  [id number] Assessment / Plan: 
Acute on chronic hypoxemic and hypercarbic respiratory failure POA (baseline 6L O2 and Trilogy at night) Acute on chronic diastolic heart failure POA with anasarca /  Worsening edema Unable to rule out COPD exacerbation Obstructive sleep apnea/obesity hypoventilation on Trilogy home Awaiting PCU bed, holding in ED Continue IV lasix Taper Steroids Was on BiPAP now taken off Will place HS and PRN BiPAP for now May consider pulmonary consult tomorrow depending on progress She stopped using her Trilogy 3 days ago as she could not tolerate having it on when laying down Empiric Zithromax Scheduled nebs Mucolytics She seems to have advance GEN and COPD, will ask Palliative care consult Continue O2 while not on BiPAP Monitor I/Os, daily weight and lytes closely 
  
Chronic LE lymphedema / venous stasis Continue lac hydrin lotion / cream 
  
CAD, s/p stents HTN Continue asa, toprol 
  
DM Continue glyburide, lantus with SSI prn 
  
Ex smoker 
  
Code Status:   Full code Surrogate Decision Maker: 
  
DVT Prophylaxis:  Heparin SQ 
GI Prophylaxis: not indicated 
   
             
  
Subjective: Pt seen and examined at bedside. Feels the same, Shortness of breath. Overnight events d/w RN  
CHIEF COMPLAINT:  f/u SOB 
  
Review of Systems: 
Symptom Y/N Comments  Symptom Y/N Comments Fever/Chills n   Chest Pain Poor Appetite    Edema Cough y   Abdominal Pain n   
Sputum    Joint Pain SOB/RAZO y   Pruritis/Rash Nausea/vomit    Tolerating PT/OT Diarrhea    Tolerating Diet y Constipation    Other Could NOT obtain due to:   
 
Objective: VITALS:  
Last 24hrs VS reviewed since prior progress note. Most recent are: 
Patient Vitals for the past 24 hrs: 
 Temp Pulse Resp BP SpO2  
10/21/18 0729     90 % 10/21/18 0625  99 20  90 % 10/21/18 0605    134/74  10/21/18 0505  95 25  91 % 10/21/18 0500  98 17 126/74   
10/21/18 0451  96 20  90 % 10/21/18 0400    117/69 91 % 10/21/18 0357  91   92 % 10/21/18 0340    128/66   
10/21/18 0315  86  117/68   
10/21/18 0150  84 22 113/56 94 % 10/20/18 2350  85 24 123/76 100 % 10/20/18 2230     (!) 84 % 10/20/18 2031 97.5 °F (36.4 °C) 88 24 111/73 99 % No intake or output data in the 24 hours ending 10/21/18 8693 PHYSICAL EXAM: 
General: WD, WN. Alert, cooperative, no acute distress   
EENT:  EOMI. Anicteric sclerae. MMM Resp:  Limited due to body habitat, No apparent wheezing on rales on Exam.  No accessory muscle use CV:  Regular  Rhythm,  ++++ edema GI:  Soft, Non distended, Non tender.  +Bowel sounds Neurologic:  Alert and oriented X 3, normal speech, Psych:   Good insight. Not anxious nor agitated Skin:  +ve chronic venous stasis changes. No jaundice Reviewed most current lab test results and cultures  YES Reviewed most current radiology test results   YES Review and summation of old records today    NO Reviewed patient's current orders and MAR    YES 
PMH/SH reviewed - no change compared to H&P 
________________________________________________________________________ Care Plan discussed with: 
  Comments Patient y Family RN y   
Care Manager Consultant Multidiciplinary team rounds were held today with , nursing, pharmacist and clinical coordinator. Patient's plan of care was discussed; medications were reviewed and discharge planning was addressed. ________________________________________________________________________ Total NON critical care TIME:  35 Minutes Total CRITICAL CARE TIME Spent:   Minutes non procedure based Comments >50% of visit spent in counseling and coordination of care    
________________________________________________________________________ Pavan Ziegler MD  
 
 Procedures: see electronic medical records for all procedures/Xrays and details which were not copied into this note but were reviewed prior to creation of Plan. LABS: 
I reviewed today's most current labs and imaging studies. Pertinent labs include: 
Recent Labs 10/20/18 
2114 WBC 7.2 HGB 12.0 HCT 41.2 * Recent Labs 10/21/18 
0436 10/20/18 
2114 * 138  
K 3.9 3.9 CL 94* 95* CO2 34* 40* * 166* BUN 13 11 CREA 0.86 0.67  
CA 11.1* 10.3* MG 1.7  --   
ALB  --  3.3* TBILI  --  0.6 SGOT  --  19 ALT  --  20 Signed: Je Garcia MD

## 2018-10-21 NOTE — ED NOTES
Upon ambulation to bedside commode, pt urinated on bed and floor. Pt's linens and gown changed at this time and pt repositioned in bed.

## 2018-10-21 NOTE — ED NOTES
Bedside and verbal report given to Bill Hines RN on Khadra Adam at shift change for routine progression of care. Report consisted of patients Situation, Background, Assessment and Recommendations(SBAR). Information from the following report(s) SBAR, Kardex, ED Summary, [facility] and Recent Results was reviewed with the receiving nurse. Opportunity for questions and clarification was provided.

## 2018-10-21 NOTE — PROGRESS NOTES
Patient states that Percocet makes her sick. She would like Morphine. She is refusing her telemetry currently.

## 2018-10-22 ENCOUNTER — PATIENT OUTREACH (OUTPATIENT)
Dept: CARDIOLOGY CLINIC | Age: 48
End: 2018-10-22

## 2018-10-22 PROBLEM — R53.83 OTHER FATIGUE: Status: ACTIVE | Noted: 2018-10-22

## 2018-10-22 PROBLEM — R05.8 PRODUCTIVE COUGH: Status: ACTIVE | Noted: 2018-10-22

## 2018-10-22 LAB
ANION GAP SERPL CALC-SCNC: 1 MMOL/L (ref 5–15)
BUN SERPL-MCNC: 16 MG/DL (ref 6–20)
BUN/CREAT SERPL: 20 (ref 12–20)
CALCIUM SERPL-MCNC: 9.9 MG/DL (ref 8.5–10.1)
CHLORIDE SERPL-SCNC: 96 MMOL/L (ref 97–108)
CO2 SERPL-SCNC: 39 MMOL/L (ref 21–32)
CREAT SERPL-MCNC: 0.8 MG/DL (ref 0.55–1.02)
GLUCOSE BLD STRIP.AUTO-MCNC: 303 MG/DL (ref 65–100)
GLUCOSE BLD STRIP.AUTO-MCNC: 316 MG/DL (ref 65–100)
GLUCOSE BLD STRIP.AUTO-MCNC: 369 MG/DL (ref 65–100)
GLUCOSE BLD STRIP.AUTO-MCNC: 413 MG/DL (ref 65–100)
GLUCOSE SERPL-MCNC: 243 MG/DL (ref 65–100)
POTASSIUM SERPL-SCNC: 4.3 MMOL/L (ref 3.5–5.1)
SERVICE CMNT-IMP: ABNORMAL
SODIUM SERPL-SCNC: 136 MMOL/L (ref 136–145)

## 2018-10-22 PROCEDURE — 76450000000

## 2018-10-22 PROCEDURE — 77030011255 HC DSG AQUACEL AG BMS -A

## 2018-10-22 PROCEDURE — 74011250637 HC RX REV CODE- 250/637: Performed by: INTERNAL MEDICINE

## 2018-10-22 PROCEDURE — 77030011256 HC DRSG MEPILEX <16IN NO BORD MOLN -A

## 2018-10-22 PROCEDURE — 82962 GLUCOSE BLOOD TEST: CPT

## 2018-10-22 PROCEDURE — 74011000250 HC RX REV CODE- 250: Performed by: INTERNAL MEDICINE

## 2018-10-22 PROCEDURE — 94640 AIRWAY INHALATION TREATMENT: CPT

## 2018-10-22 PROCEDURE — 74011250637 HC RX REV CODE- 250/637: Performed by: HOSPITALIST

## 2018-10-22 PROCEDURE — 94660 CPAP INITIATION&MGMT: CPT

## 2018-10-22 PROCEDURE — 65660000000 HC RM CCU STEPDOWN

## 2018-10-22 PROCEDURE — 74011636637 HC RX REV CODE- 636/637: Performed by: INTERNAL MEDICINE

## 2018-10-22 PROCEDURE — 36415 COLL VENOUS BLD VENIPUNCTURE: CPT | Performed by: INTERNAL MEDICINE

## 2018-10-22 PROCEDURE — 74011250636 HC RX REV CODE- 250/636: Performed by: INTERNAL MEDICINE

## 2018-10-22 PROCEDURE — 77010033678 HC OXYGEN DAILY

## 2018-10-22 PROCEDURE — 80048 BASIC METABOLIC PNL TOTAL CA: CPT | Performed by: INTERNAL MEDICINE

## 2018-10-22 RX ORDER — INSULIN LISPRO 100 [IU]/ML
12 INJECTION, SOLUTION INTRAVENOUS; SUBCUTANEOUS ONCE
Status: COMPLETED | OUTPATIENT
Start: 2018-10-22 | End: 2018-10-22

## 2018-10-22 RX ORDER — FLUTICASONE PROPIONATE AND SALMETEROL 500; 50 UG/1; UG/1
1 POWDER RESPIRATORY (INHALATION) DAILY
COMMUNITY
End: 2020-08-17

## 2018-10-22 RX ORDER — NYSTATIN 100000 [USP'U]/G
POWDER TOPICAL 2 TIMES DAILY
Status: DISCONTINUED | OUTPATIENT
Start: 2018-10-22 | End: 2018-10-26 | Stop reason: HOSPADM

## 2018-10-22 RX ORDER — LORATADINE 10 MG/1
10 TABLET ORAL DAILY
COMMUNITY

## 2018-10-22 RX ORDER — CYCLOBENZAPRINE HCL 10 MG
5 TABLET ORAL
Status: DISCONTINUED | OUTPATIENT
Start: 2018-10-22 | End: 2018-10-26 | Stop reason: HOSPADM

## 2018-10-22 RX ORDER — FAMOTIDINE 20 MG/1
20 TABLET, FILM COATED ORAL 2 TIMES DAILY
Status: DISCONTINUED | OUTPATIENT
Start: 2018-10-22 | End: 2018-10-26 | Stop reason: HOSPADM

## 2018-10-22 RX ORDER — IPRATROPIUM BROMIDE AND ALBUTEROL SULFATE 2.5; .5 MG/3ML; MG/3ML
3 SOLUTION RESPIRATORY (INHALATION)
Status: DISCONTINUED | OUTPATIENT
Start: 2018-10-23 | End: 2018-10-26 | Stop reason: HOSPADM

## 2018-10-22 RX ADMIN — POLYETHYLENE GLYCOL 3350 17 G: 17 POWDER, FOR SOLUTION ORAL at 08:29

## 2018-10-22 RX ADMIN — IPRATROPIUM BROMIDE AND ALBUTEROL SULFATE 3 ML: .5; 3 SOLUTION RESPIRATORY (INHALATION) at 07:12

## 2018-10-22 RX ADMIN — METHYLPREDNISOLONE SODIUM SUCCINATE 40 MG: 125 INJECTION, POWDER, FOR SOLUTION INTRAMUSCULAR; INTRAVENOUS at 13:19

## 2018-10-22 RX ADMIN — IPRATROPIUM BROMIDE AND ALBUTEROL SULFATE 3 ML: .5; 3 SOLUTION RESPIRATORY (INHALATION) at 19:41

## 2018-10-22 RX ADMIN — GUAIFENESIN 600 MG: 600 TABLET, EXTENDED RELEASE ORAL at 08:30

## 2018-10-22 RX ADMIN — FAMOTIDINE 20 MG: 20 TABLET ORAL at 17:46

## 2018-10-22 RX ADMIN — CYCLOBENZAPRINE HYDROCHLORIDE 5 MG: 10 TABLET, FILM COATED ORAL at 19:15

## 2018-10-22 RX ADMIN — Medication 10 ML: at 21:21

## 2018-10-22 RX ADMIN — INSULIN LISPRO 7 UNITS: 100 INJECTION, SOLUTION INTRAVENOUS; SUBCUTANEOUS at 08:29

## 2018-10-22 RX ADMIN — Medication 10 ML: at 06:24

## 2018-10-22 RX ADMIN — AZITHROMYCIN 500 MG: 250 TABLET, FILM COATED ORAL at 08:30

## 2018-10-22 RX ADMIN — HUMAN INSULIN 15 UNITS: 100 INJECTION, SUSPENSION SUBCUTANEOUS at 21:20

## 2018-10-22 RX ADMIN — IPRATROPIUM BROMIDE AND ALBUTEROL SULFATE 3 ML: .5; 3 SOLUTION RESPIRATORY (INHALATION) at 15:43

## 2018-10-22 RX ADMIN — METHYLPREDNISOLONE SODIUM SUCCINATE 40 MG: 125 INJECTION, POWDER, FOR SOLUTION INTRAMUSCULAR; INTRAVENOUS at 20:35

## 2018-10-22 RX ADMIN — IPRATROPIUM BROMIDE AND ALBUTEROL SULFATE 3 ML: .5; 3 SOLUTION RESPIRATORY (INHALATION) at 00:18

## 2018-10-22 RX ADMIN — INSULIN LISPRO 4 UNITS: 100 INJECTION, SOLUTION INTRAVENOUS; SUBCUTANEOUS at 22:23

## 2018-10-22 RX ADMIN — OXYCODONE HYDROCHLORIDE 5 MG: 5 TABLET ORAL at 23:45

## 2018-10-22 RX ADMIN — ASPIRIN 81 MG CHEWABLE TABLET 81 MG: 81 TABLET CHEWABLE at 08:30

## 2018-10-22 RX ADMIN — INSULIN LISPRO 7 UNITS: 100 INJECTION, SOLUTION INTRAVENOUS; SUBCUTANEOUS at 17:45

## 2018-10-22 RX ADMIN — FUROSEMIDE 40 MG: 10 INJECTION, SOLUTION INTRAMUSCULAR; INTRAVENOUS at 20:35

## 2018-10-22 RX ADMIN — GLYBURIDE 5 MG: 5 TABLET ORAL at 08:30

## 2018-10-22 RX ADMIN — HEPARIN SODIUM 7500 UNITS: 5000 INJECTION INTRAVENOUS; SUBCUTANEOUS at 08:29

## 2018-10-22 RX ADMIN — IPRATROPIUM BROMIDE AND ALBUTEROL SULFATE 3 ML: .5; 3 SOLUTION RESPIRATORY (INHALATION) at 11:30

## 2018-10-22 RX ADMIN — INSULIN GLARGINE 35 UNITS: 100 INJECTION, SOLUTION SUBCUTANEOUS at 08:29

## 2018-10-22 RX ADMIN — GUAIFENESIN 600 MG: 600 TABLET, EXTENDED RELEASE ORAL at 17:46

## 2018-10-22 RX ADMIN — INSULIN LISPRO 12 UNITS: 100 INJECTION, SOLUTION INTRAVENOUS; SUBCUTANEOUS at 13:19

## 2018-10-22 RX ADMIN — NYSTATIN: 100000 POWDER TOPICAL at 18:56

## 2018-10-22 RX ADMIN — METOPROLOL SUCCINATE 25 MG: 50 TABLET, EXTENDED RELEASE ORAL at 08:30

## 2018-10-22 RX ADMIN — IPRATROPIUM BROMIDE AND ALBUTEROL SULFATE 3 ML: .5; 3 SOLUTION RESPIRATORY (INHALATION) at 03:36

## 2018-10-22 RX ADMIN — HEPARIN SODIUM 7500 UNITS: 5000 INJECTION INTRAVENOUS; SUBCUTANEOUS at 17:46

## 2018-10-22 RX ADMIN — OXYCODONE HYDROCHLORIDE 5 MG: 5 TABLET ORAL at 13:20

## 2018-10-22 RX ADMIN — FUROSEMIDE 40 MG: 10 INJECTION, SOLUTION INTRAMUSCULAR; INTRAVENOUS at 13:19

## 2018-10-22 RX ADMIN — HEPARIN SODIUM 7500 UNITS: 5000 INJECTION INTRAVENOUS; SUBCUTANEOUS at 23:43

## 2018-10-22 NOTE — PROGRESS NOTES
PCU SHIFT NURSING NOTE Bedside shift change report given to [facility] (oncoming nurse) by Tiffany Baez (offgoing nurse). Report included the following information SBAR, Kardex, Procedure Summary, Intake/Output and Med Rec Status. Shift Summary:  
12- Paged PICC team to assist in obtaining a more patient IV access. Current access is very positional and causing the pt some pain. Admission Date 10/20/2018 Admission Diagnosis Acute on chronic respiratory failure with hypoxia and hypercapnia (HCC) Consults IP CONSULT TO PALLIATIVE CARE - PROVIDER Consults []PT []OT []Speech  
[]Case Management  
  
[] Palliative Cardiac Monitoring Order []Yes []No  
 
IV drips []Yes Drip:                            Dose: 
Drip:                            Dose: 
Drip:                            Dose:  
[]No  
 
GI Prophylaxis []Yes []No  
 
 
 
DVT Prophylaxis SCDs:     
     
 Haroon stockings:     
  
[] Medication []Contraindicated []None Activity Level Activity Level: Up with Assistance Activity Assistance: Partial (one person) Purposeful Rounding every 1-2 hour? []Yes Ogden Score  Total Score: 3 Bed Alarm (If score 3 or >) []Yes  
[] Refused (See signed refusal form in chart) Jeffrey Score  Jeffrey Score: 19 Jeffrey Score (if score 14 or less) []PMT consult  
[]Wound Care consult []Specialty bed  
[] Nutrition consult Needs prior to discharge:  
Home O2 required:   
[]Yes []No  
 If yes, how much O2 required? Other:  
 Last Bowel Movement: Last Bowel Movement Date: 10/22/18 Influenza Vaccine Received Flu Vaccine for Current Season (usually Sept-March): No  
 Patient/Guardian Refused (Notify MD): Yes Pneumonia Vaccine Diet Active Orders Diet DIET DIABETIC CONSISTENT CARB Regular LDAs Peripheral IV 10/21/18 Right Forearm (Active) Site Assessment Clean, dry, & intact 10/22/2018  1:11 AM  
 Phlebitis Assessment 0 10/22/2018  1:11 AM  
Infiltration Assessment 0 10/22/2018  1:11 AM  
Dressing Status Clean, dry, & intact 10/22/2018  1:11 AM  
Dressing Type Transparent;Tape 10/22/2018  1:11 AM  
Hub Color/Line Status Green;Capped 10/22/2018  1:11 AM  
Action Taken Open ports on tubing capped 10/22/2018  1:11 AM  
Alcohol Cap Used Yes 10/22/2018  1:11 AM  
                  
Urinary Catheter Intake & Output Date 10/21/18 0700 - 10/22/18 5654 10/22/18 0700 - 10/23/18 4104 Shift 3042-3904 7012-4808 24 Hour Total 5385-8342 3977-4968 24 Hour Total  
INTAKE Shift Total(mL/kg) OUTPUT Urine(mL/kg/hr)  900(0.4) 900(0.2) Urine Voided  900 900 Shift Total(mL/kg)  900(4.7) 900(4.7) NET  -900 -900 Weight (kg) 201.8 190.8 190.8 190.8 190.8 190.8 Readmission Risk Assessment Tool Score High Risk   
      
 30 Total Score 3 Has Seen PCP in Last 6 Months (Yes=3, No=0)  
 11 IP Visits Last 12 Months (1-3=4, 4=9, >4=11) 9 Pt. Coverage (Medicare=5 , Medicaid, or Self-Pay=4) 7 Charlson Comorbidity Score (Age + Comorbid Conditions) Criteria that do not apply:  
 . Living with Significant Other. Assisted Living. LTAC. SNF. or  
Rehab Patient Length of Stay (>5 days = 3) Expected Length of Stay - - - Actual Length of Stay 2

## 2018-10-22 NOTE — CONSULTS
Palliative Medicine Consult  Gilles: [phone]    Patient Name: Rancho Dodd  YOB: 1970    Date of Initial Consult: 10/22/18  Reason for Consult: Care Decisions  Requesting Provider: DIANE Vidal MD  Primary Care Physician: Niki Leyva NP     SUMMARY:   Rancho Dodd is a 57 y.o. with a past history of morbid obesity, acute on chronic hypercarbic respiratory failure, COPD, DM, HTN, GEN/OHS on trilogy at home, chronic diastolic heart failure and recent diagnosis of breast cancer, who was admitted on 10/20/2018 from home with a diagnosis of acute on chronic respiratory failure with hypoxia and hypercapnia. Current medical issues leading to Palliative Medicine involvement include: this is the 11th ED visit with 6 resulting admissions in the past 12 months for respiratory issues. She has a h/o breast cancer, pancreatitis, morbid obesity and home bound. Pt has an AMD that I helped her complete 2018. Psychosocial:  w/c bound in rental home, lives alone, has caregivers through [place], and she is seen by \"[facility]\". Mother  in March, pt has 1 sister and 2 brothers who are all estranged from pt. She had twins, 1  at 10 months and 1 son is alive, adult, Alyse Duverney. mPOA is  [facility]. PALLIATIVE DIAGNOSES:   1. SOB  2. Nocturnal dyspnea  3. Orthopnea   4. Productive cough   5. Chills  6. Edema   7. Wheezing   8. Anasarca   9. Breast cancer  10. Hypoventilation syndrome  11. GEN  12. Acute on chronic diastolic heart failure  13. Care decisions     PLAN:   1. Attempted to meet with pt, however, she was unable to communicate well due to being on bipap. Will try tomorrow. 2. Please note that pt recently completed an AMD in 2018. 3. Initial consult note routed to primary continuity provider  4.  Communicated plan of care with: Palliative IDT, RN       GOALS OF CARE / TREATMENT PREFERENCES:     GOALS OF CARE:  Patient/Health Care Proxy Stated Goals: Prolong life  Pending     TREATMENT PREFERENCES:   Code Status: Full Code    Advance Care Planning:  June Jordyn Herndon [phone],  VINAYAK VAWNPPBMX [phone]  Advance Care Planning 10/22/2018   Patient's Healthcare Decision Maker is: Named in scanned ACP document   Primary Decision Maker Name -   Primary Decision Maker Phone Number -   Primary Decision Maker Relationship to Patient -   Secondary Decision Maker Name -   Secondary Decision [address] Phone Number -   Secondary Decision Maker Relationship to Patient -   Confirm Advance Directive Yes, on file   Patient Would Like to Complete Advance Directive -       Medical Interventions: Full interventions   Other Instructions: Other:    As far as possible, the palliative care team has discussed with patient / health care proxy about goals of care / treatment preferences for patient. HISTORY:     History obtained from: chart    CHIEF COMPLAINT: worsening SOB    HPI/SUBJECTIVE:    The patient is:   [] Verbal and participatory  [x] Non-participatory due to: BIPAP    10/20: BIBA with c/o worsening SOB with associated productive cough and chills x 5 days, these sxs keep pt up at night. Pt was inpatient 1 week ago for PNA. Her baseline 02 use is 6L, and she uses a nebulizer. ED W/U:  EXAM:  Wheezing, edema, opened sore on right breast  LAB:  PC02 80, P02 69, bicarb 46, glucose 166  IMAGING:  CXR no acute changes    HOSPITAL COURSE: admitted to PCU for progression of care.       Clinical Pain Assessment (nonverbal scale for severity on nonverbal patients):   Clinical Pain Assessment  Severity: 0          Duration: for how long has pt been experiencing pain (e.g., 2 days, 1 month, years)  Frequency: how often pain is an issue (e.g., several times per day, once every few days, constant)     FUNCTIONAL ASSESSMENT:     Palliative Performance Scale (PPS):  PPS: 20       PSYCHOSOCIAL/SPIRITUAL SCREENING:     Palliative IDT has assessed this patient for cultural preferences / practices and a referral made as appropriate to needs (Cultural Services, Patient Advocacy, Ethics, etc.)    Advance Care Planning:  Advance Care Planning 10/22/2018   Patient's Healthcare Decision Maker is: Named in scanned ACP document   Primary Decision Maker Name -   Primary Decision Maker Phone Number -   Primary Decision Maker Relationship to Patient -   Secondary Decision Maker Name -   Secondary Decision [address] Phone Number -   Secondary Decision Maker Relationship to Patient -   Confirm Advance Directive Yes, on file   Patient Would Like to Complete Advance Directive -       Any spiritual / Scientologist concerns:  [] Yes /  [x] No    Caregiver Burnout:  [] Yes /  [] No /  [x] No Caregiver Present      Anticipatory grief assessment:   [x] Normal  / [] Maladaptive       ESAS Anxiety: Anxiety: 0    ESAS Depression:   unable to assess due to pt factors     REVIEW OF SYSTEMS:     Positive and pertinent negative findings in ROS are noted above in HPI. The following systems were [x] reviewed / [] unable to be reviewed as noted in HPI  Other findings are noted below. Systems: constitutional, ears/nose/mouth/throat, respiratory, gastrointestinal, genitourinary, musculoskeletal, integumentary, neurologic, psychiatric, endocrine. Positive findings noted below. Modified ESAS Completed by: provider   Fatigue: 8 Drowsiness: 3     Pain: 0   Anxiety: 0     Anorexia: 0 Dyspnea: 2           Stool Occurrence(s): 1        PHYSICAL EXAM:     From RN flowsheet:  Wt Readings from Last 3 Encounters:   10/22/18 (!) 420 lb 9.6 oz (190.8 kg)   09/27/18 (!) 416 lb (188.7 kg)   08/23/18 (!) 403 lb 3 oz (182.9 kg)     Blood pressure 132/65, pulse 76, temperature 97.6 °F (36.4 °C), resp. rate 20, height 5' 7\" (1.702 m), weight (!) 420 lb 9.6 oz (190.8 kg), SpO2 91 %.     Pain Scale 1: Numeric (0 - 10)  Pain Intensity 1: 5  Pain Onset 1: chronic  Pain Location 1: Breast  Pain Orientation 1: Right  Pain Description 1: Aching  Pain Intervention(s) 1: Medication (see MAR)  Last bowel movement, if known:     Constitutional: morbidly obese, lying in bed on left side with bipap  Eyes: pupils equal, anicteric  ENMT: no nasal discharge, moist mucous membranes  Cardiovascular: regular rhythm, distal pulses intact  Respiratory: breathing not labored, symmetric  Gastrointestinal: soft non-tender, +bowel sounds  Musculoskeletal: no deformity, no tenderness to palpation  Skin: warm, dry  Neurologic: following commands, moving all extremities  Psychiatric: unable to assess due to pt factors     HISTORY:     Active Problems:    Acute on chronic respiratory failure with hypoxia and hypercapnia (HCC) (2018)      Past Medical History:   Diagnosis Date    Arthritis     Asthma     Breast lump     CAD (coronary artery disease)     Congestive heart failure (HCC)     [facility]ch[facility]     Diabetes [facility]     Hypertension     Morbid obesity with BMI of 70 and over, adult ([facility]     NSTEMI (non-ST elevated myocardial infarction) ([facility]     SVT (supraventricular tachycardia) ([facility]       Past Surgical History:   Procedure Laterality Date    CARDIAC SURG PROCEDURE UNLIST      Stents    HX  SECTION      HX ORTHOPAEDIC      HX OTHER SURGICAL      cyst removed from back      Family History   Problem Relation Age of Onset    Heart Disease Mother     Heart Disease Father     Heart Disease Sister     Breast Cancer Maternal Grandmother     Breast Cancer Paternal Grandmother       History reviewed, no pertinent family history.   Social History     Tobacco Use    Smoking status: Current Every Day Smoker     Packs/day: 0.25     Types: Cigarettes    Smokeless tobacco: Never Used    Tobacco comment: Patient states \"I  aint smoking no more d*mn cigarettes after yesterday\" 2018   Substance Use Topics    Alcohol use: No     No Known Allergies   Current Facility-Administered Medications   Medication Dose Route Frequency    famotidine (PEPCID) tablet 20 mg  20 mg Oral BID    aspirin chewable tablet 81 mg  81 mg Oral DAILY    glyBURIDE (DIABETA) tablet 5 mg  5 mg Oral ACB    insulin glargine (LANTUS) injection 35 Units  35 Units SubCUTAneous DAILY    metoprolol succinate (TOPROL-XL) XL tablet 25 mg  25 mg Oral DAILY    polyethylene glycol (MIRALAX) packet 17 g  17 g Oral DAILY    methylPREDNISolone (PF) (SOLU-MEDROL) injection 40 mg  40 mg IntraVENous Q12H    oxyCODONE IR (ROXICODONE) tablet 5 mg  5 mg Oral Q6H PRN    sodium chloride (NS) flush 5-10 mL  5-10 mL IntraVENous Q8H    sodium chloride (NS) flush 5-10 mL  5-10 mL IntraVENous PRN    acetaminophen (TYLENOL) tablet 650 mg  650 mg Oral Q4H PRN    naloxone (NARCAN) injection 0.4 mg  0.4 mg IntraVENous PRN    ondansetron (ZOFRAN) injection 4 mg  4 mg IntraVENous Q4H PRN    heparin (porcine) injection 7,500 Units  7,500 Units SubCUTAneous Q8H    insulin lispro (HUMALOG) injection   SubCUTAneous AC&HS    glucose chewable tablet 16 g  4 Tab Oral PRN    dextrose (D50W) injection syrg 12.5-25 g  12.5-25 g IntraVENous PRN    glucagon (GLUCAGEN) injection 1 mg  1 mg IntraMUSCular PRN    furosemide (LASIX) injection 40 mg  40 mg IntraVENous Q12H    guaiFENesin ER (MUCINEX) tablet 600 mg  600 mg Oral BID    albuterol-ipratropium (DUO-NEB) 2.5 MG-0.5 MG/3 ML  3 mL Nebulization Q4H RT    albuterol-ipratropium (DUO-NEB) 2.5 MG-0.5 MG/3 ML  3 mL Nebulization Q4H PRN    azithromycin (ZITHROMAX) tablet 500 mg  500 mg Oral DAILY    ammonium lactate (LAC-HYDRIN) 12 % lotion   Topical BID          LAB AND IMAGING FINDINGS:     Lab Results   Component Value Date/Time    WBC 7.2 10/20/2018 09:14 PM    HGB 12.0 10/20/2018 09:14 PM    PLATELET 430 (L) 40/80/4700 09:14 PM     Lab Results   Component Value Date/Time    Sodium 136 10/22/2018 03:11 AM    Potassium 4.3 10/22/2018 03:11 AM    Chloride 96 (L) 10/22/2018 03:11 AM    CO2 39 (H) 10/22/2018 03:11 AM BUN 16 10/22/2018 03:11 AM    Creatinine 0.80 10/22/2018 03:11 AM    Calcium 9.9 10/22/2018 03:11 AM    Magnesium 1.7 10/21/2018 04:36 AM    Phosphorus 1.4 (L) 09/26/2018 12:41 AM      Lab Results   Component Value Date/Time    AST (SGOT) 19 10/20/2018 09:14 PM    Alk. phosphatase 97 10/20/2018 09:14 PM    Protein, total 8.5 (H) 10/20/2018 09:14 PM    Albumin 3.3 (L) 10/20/2018 09:14 PM    Globulin 5.2 (H) 10/20/2018 09:14 PM     Lab Results   Component Value Date/Time    INR 1.0 07/09/2018 03:15 AM    Prothrombin time 10.2 07/09/2018 03:15 AM    aPTT 22.7 07/09/2018 03:15 AM      No results found for: IRON, FE, TIBC, IBCT, PSAT, FERR   Lab Results   Component Value Date/Time    pH 7.44 10/21/2018 12:00 AM    PCO2 62 (H) 10/21/2018 12:00 AM    PO2 52 (L) 10/21/2018 12:00 AM     No components found for: [place]   Lab Results   Component Value Date/Time    CK 42 07/09/2018 12:06 AM    CK - MB <1.0 07/09/2018 12:06 AM                Total time: 65  Counseling / coordination time, spent as noted above: 55  > 50% counseling / coordination?: y    Prolonged service was provided for  []30 min   []75 min in face to face time in the presence of the patient, spent as noted above. Time Start:   Time End:   Note: this can only be billed with 48498 (initial) or 85751 (follow up). If multiple start / stop times, list each separately.

## 2018-10-22 NOTE — DIABETES MGMT
DTC Progress Note Recommendations/ Comments: Please consider adding NPH 20 units [id number] linked and timed with solu-medrol. Msg sent to Dr. Kaitlynn Chamorro. Current hospital DM medication: glyburide 5mg ac b, lantus 35 units daily and humalog correction Chart reviewed on Mariluz Richardson. Patient is a 57 y.o. female with known Type 2 Diabetes on lantus 35 units daily and glyburide 5mg ac b at home. A1c:  
Lab Results Component Value Date/Time Hemoglobin A1c 8.4 (H) 10/21/2018 04:36 AM  
 Hemoglobin A1c 8.2 (H) 08/21/2018 02:22 AM  
 
 
Recent Glucose Results:  
Lab Results Component Value Date/Time  (H) 10/22/2018 03:11 AM  
 GLUCPOC 303 (H) 10/22/2018 04:40 PM  
 GLUCPOC 413 (H) 10/22/2018 11:12 AM  
 GLUCPOC 316 (H) 10/22/2018 07:52 AM  
  
 
Lab Results Component Value Date/Time Creatinine 0.80 10/22/2018 03:11 AM  
 
Estimated Creatinine Clearance: 157.2 mL/min (based on SCr of 0.8 mg/dL). Active Orders Diet DIET DIABETIC CONSISTENT CARB Regular PO intake: No data found. Will continue to follow as needed. Thank you OMARI RashidN, RN, CDE [facility]

## 2018-10-22 NOTE — PROGRESS NOTES
Physical Therapy Screening: 
Services are not indicated at this time. [facility] based on results obtained during the nursing admission assessment. The patients chart was reviewed and the patient is not appropriate for a skilled therapy evaluation at this time. Please consult physical therapy if any therapy needs arise. Thank you.  
 
Amber Carpenter, PT

## 2018-10-22 NOTE — PROGRESS NOTES
Hospitalist Progress Note NAME: Alton Estrada :  1970 MRN:  [id number] Assessment / Plan: 
Acute on chronic hypoxemic and hypercarbic respiratory failure POA (baseline 6L O2 and Trilogy at night) Acute on chronic diastolic heart failure POA with anasarca /  Worsening edema Unable to rule out COPD exacerbation Obstructive sleep apnea/obesity hypoventilation on Trilogy home Continue IV lasix Taper Steroids Cont Zithromax Nebs Mucolytics BIPAP as needed Consider pulm consult if no improvement She stopped using her Trilogy 3 days ago prior to admission as she could not tolerate having it on when laying down. Palliative care consulted due to advanced GEN and COPD Continue O2 while not on BiPAP Monitor I/Os, daily weight and lytes closely 
  
Chronic LE lymphedema / venous stasis Continue lac hydrin lotion / cream 
  
CAD, s/p stents HTN Continue asa, toprol 
  
DM Continue glyburide, lantus with SSI prn 
  
Ex smoker Morbid obesity - due to excess weight  
  
Code Status:   Full code Surrogate Decision Maker: 
  
DVT Prophylaxis:  Heparin SQ 
GI Prophylaxis: not indicated 
   
             
  
Subjective: Pt seen and examined at bedside. Feels the same, Shortness of breath. Overnight events d/w RN  
CHIEF COMPLAINT:  f/u SOB 
  
Review of Systems: 
Symptom Y/N Comments  Symptom Y/N Comments Fever/Chills n   Chest Pain n   
Poor Appetite    Edema Cough y   Abdominal Pain n   
Sputum    Joint Pain SOB/RAZO y   Pruritis/Rash Nausea/vomit    Tolerating PT/OT Diarrhea    Tolerating Diet y Constipation    Other Could NOT obtain due to:   
 
Objective: VITALS:  
Last 24hrs VS reviewed since prior progress note. Most recent are: 
Patient Vitals for the past 24 hrs: 
 Temp Pulse Resp BP SpO2  
10/22/18 0734 97.6 °F (36.4 °C) 76 20 132/65 91 % 10/22/18 0713     96 % 10/22/18 0712     99 % 10/22/18 0639  90   90 % 10/22/18 0334     90 % 10/22/18 0315 97.6 °F (36.4 °C) 89 18 128/65 92 % 10/22/18 0016     90 % 10/22/18 0015 97.6 °F (36.4 °C) 96 16 143/59 90 % 10/21/18 2100 97.1 °F (36.2 °C) 72 16 117/67 91 % 10/21/18 1618 98.5 °F (36.9 °C) 85  120/90   
10/21/18 1550     90 % Intake/Output Summary (Last 24 hours) at 10/22/2018 1345 Last data filed at 10/22/2018 1257 Gross per 24 hour Intake  Output 1975 ml Net -1975 ml PHYSICAL EXAM: 
General: WD, WN. Alert, cooperative, no acute distress   
EENT:  EOMI. Anicteric sclerae. MMM Resp:  Limited due to body habitat, No apparent wheezing on rales on Exam.  No accessory muscle use CV:  Regular  Rhythm,  ++++ edema GI:  Soft, Non distended, Non tender.  +Bowel sounds Neurologic:  Alert and oriented X 3, normal speech, Psych:   Good insight. Not anxious nor agitated Skin:  +ve chronic venous stasis changes. No jaundice Reviewed most current lab test results and cultures  YES Reviewed most current radiology test results   YES Review and summation of old records today    NO Reviewed patient's current orders and MAR    YES 
PMH/SH reviewed - no change compared to H&P 
________________________________________________________________________ Care Plan discussed with: 
  Comments Patient y Family RN y   
Care Manager Consultant Multidiciplinary team rounds were held today with , nursing, pharmacist and clinical coordinator. Patient's plan of care was discussed; medications were reviewed and discharge planning was addressed. ________________________________________________________________________ Total NON critical care TIME:  25 Minutes Total CRITICAL CARE TIME Spent:   Minutes non procedure based Comments >50% of visit spent in counseling and coordination of care    
________________________________________________________________________ Lisa Alonzo MD  
 
 Procedures: see electronic medical records for all procedures/Xrays and details which were not copied into this note but were reviewed prior to creation of Plan. LABS: 
I reviewed today's most current labs and imaging studies. Pertinent labs include: 
Recent Labs 10/20/18 
2114 WBC 7.2 HGB 12.0 HCT 41.2 * Recent Labs 10/22/18 
0704 10/21/18 
6135 10/20/18 
2114  135* 138  
K 4.3 3.9 3.9 CL 96* 94* 95* CO2 39* 34* 40* * 310* 166* BUN 16 13 11 CREA 0.80 0.86 0.67 CA 9.9 11.1* 10.3* MG  --  1.7  --   
ALB  --   --  3.3* TBILI  --   --  0.6 SGOT  --   --  19 ALT  --   --  20 Signed: Ray Craven MD

## 2018-10-22 NOTE — PROGRESS NOTES
Spiritual Care Assessment/Progress Note [address] 
 
 
NAME: Brody Bonilla      MRN: [id number] AGE: 57 y.o. SEX: female Quaker Affiliation: Druze  
Language: English  
 
10/22/2018     Total Time (in minutes): 25 Spiritual Assessment begun in [facility] 2 PROGRESSIVE CARE through conversation with: 
  
    [x]Patient        [] Family    [] Friend(s) Reason for Consult: Palliative Care, Initial/Spiritual Assessment Spiritual beliefs: (Please include comment if needed) 
   [] Identifies with a daryn tradition:     
   [] Supported by a daryn community:        
   [] Claims no spiritual orientation:       
   [] Seeking spiritual identity:            
   [] Adheres to an individual form of spirituality:       
   [x] Not able to assess:                   
 
    
Identified resources for coping:  
   [] Prayer                           
   [] Music                  [] Guided Imagery 
   [] Family/friends                 [] Pet visits [] Devotional reading                         [] Unknown 
   [] Other: Patient expresses that she is alone. Interventions offered during this visit: (See comments for more details) Plan of Care: 
 
 [x] Support spiritual and/or cultural needs  
 [] Support AMD and/or advance care planning process    
 [] Support grieving process 
 [] Coordinate Rites and/or Rituals  
 [] Coordination with community clergy 
 [x] No spiritual needs identified at this time 
 [] Detailed Plan of Care below (See Comments)  [] Make referral to Music Therapy 
[] Make referral to Pet Therapy    
[] Make referral to Addiction services 
[] Make referral to [facility] 
[] Make referral to Spiritual Care Partner 
[] No future visits requested       
[x] Follow up visits as needed Comments:  The patient was seated on the side of her bed reading the news paper when I entered the room The patient listened carefully as I introduced myself and explained what spiritual care is about. Since we share the same last name, I began the conversation about whether she is a Ronen Range by marriage of if it is her family name. The patient indicated the latter and added that she told her father that she was not going to  and that she would never  and that she would always be a Ronen Range. The patient went on to tell me about her family. The discussion about family turned out to be a long story. The patient had a need to talk about her trials and efforts to support others. She spoke about the loss of her parent's and her estrangement from family that she has grown accustomed to. I attempted to inquire about the patient's coping mechanisms, but the patient continued her story. The visit came to an end when the patient's nurse entered the room to assist her with toileting. As the nurse entered the room I assured the patient that I would keep her in my prayers. The patient expressed gratitude for the visit and requested that I return. My response to her was encouraging. I will attempt to follow-up as time permits. A review of the patient's chart reveals that she excluded some information about her family dynamics that appears to be pertinent. 601 Nitin Jones EdD, MDiv For [place] Page [phone])

## 2018-10-22 NOTE — PROGRESS NOTES
Pharmacy Medication Reconciliation The patient was interviewed regarding current PTA medication list, use and drug allergies; Pt present in room and obtained permission from patient to discuss drug regimen with visitor(s) present. The patient was questioned regarding use of any other inhalers, topical products, over the counter medications, herbal medications, vitamin products or ophthalmic/nasal/otic medication use. Allergy Update: none Recommendations/Findings: The following amendments were made to the patient's active medication list on file at [facility]:  
1) Additions: loratadine 2) Deletions: Ventolin 2.5mg/3ml, cetirizine, lidocaine 5% patch 3) Changes: insulin lantus changed from 15 to 35 units, 
 
 
-Clarified PTA med list with Pt and RxQuery. PTA medication list was corrected to the following:  
 
Prior to Admission Medications Prescriptions Last Dose Informant Patient Reported? Taking? albuterol (VENTOLIN HFA) 90 mcg/actuation inhaler  Self Yes No  
Sig: Take 2 Puffs by inhalation every six (6) hours as needed for Wheezing. ammonium lactate (LAC-HYDRIN) 12 % topical cream  Self No No  
Sig: rub in to affected area well  
aspirin 81 mg chewable tablet  Self Yes No  
Sig: Take 81 mg by mouth daily. cyclobenzaprine (FLEXERIL) 5 mg tablet  Self Yes No  
Sig: Take 5 mg by mouth three (3) times daily as needed. flexeriol   
fluticasone (FLONASE) 50 mcg/actuation nasal spray  Self No No  
Si Sprays by Both Nostrils route daily. fluticasone-salmeterol (ADVAIR DISKUS) 500-50 mcg/dose diskus inhaler  Self Yes Yes Sig: Take 1 Puff by inhalation every twelve (12) hours. furosemide (LASIX) 40 mg tablet  Self Yes No  
Sig: Take 40 mg by mouth two (2) times a day. Indications: Peripheral Edema due to Chronic Heart Failure  
glyBURIDE (DIABETA) 5 mg tablet  Self Yes No  
Sig: Take 5 mg by mouth Daily (before breakfast).   
guaiFENesin ER (MUCINEX) 600 mg ER tablet  Self No No  
 Sig: Take 1 Tab by mouth two (2) times a day.  
hydrOXYzine pamoate (VISTARIL) 50 mg capsule  Self Yes No  
Sig: Take 50 mg by mouth every eight (8) hours as needed for Itching. Indications: Pruritus of Skin  
insulin glargine (LANTUS) 100 unit/mL injection  Self Yes No  
Si Units by SubCUTAneous route daily. ketoconazole (NIZORAL) 2 % topical cream  Self No No  
Sig: Apply  to affected area daily. loratadine (CLARITIN) 10 mg tablet   Yes Yes Sig: Take 10 mg by mouth daily. lovastatin (MEVACOR) 40 mg tablet  Self No No  
Sig: Take 1 Tab by mouth nightly. metoprolol succinate (TOPROL-XL) 25 mg XL tablet  Self Yes No  
Sig: Take  by mouth two (2) times a day. nitroglycerin (NITROSTAT) 0.4 mg SL tablet  Self No No  
Si Tab by SubLINGual route every five (5) minutes as needed for Chest Pain (call 911 if not relieved by 3). oxyCODONE IR (ROXICODONE) 5 mg immediate release tablet  Self No No  
Sig: Take 1 Tab by mouth every four (4) hours as needed (pain 6-10). Max Daily Amount: 30 mg.  
polyethylene glycol (MIRALAX) 17 gram packet  Self No No  
Sig: Take 1 Packet by mouth daily. Facility-Administered Medications: None Thank you, Pat Fragoso, PharmD Student

## 2018-10-22 NOTE — CARDIO/PULMONARY
Cardiopulmonary Rehab:  
 
Chart reviewed. Pt is on the CHF bundle list. 
 
Pt last visited for CHF teaching 2/1/18. Pt visited. Living With Heart Failure book provided. This was a follow-up visit to answer questions and reinforce prior teaching re: CHF, S&Ss, medication management, Low NA diet, daily weights and when to call the doctor. Pt is not following low sodium diet. Occasionally adds salt to her meals and Pt admits to eating out whenever her aids aren't available for cooking. States she is taking her medications out of her med bottles. Encouraged to use her pill box to avoid medication mistakes. She is not weighing on the scale previously provided. States she is unsure if it is in the attic. Discussed rationale for tracking daily weights and possibly avoiding hospitalization. Pt verbalized understanding.

## 2018-10-22 NOTE — WOUND CARE
Wound care Nurse consult from staff nurse stating \" Left breast blister\". Patient is a morbidly obese 56 y/o AAF who is admitted frequently for CHF and respiratory issues. Past Medical History:  
Diagnosis Date  Arthritis  Asthma  Breast lump  CAD (coronary artery disease)  Congest[facility] heart failure ([facility]  COPD (chronic obstructive pulmonary disease) ([facility])  Diabetes [facility]  Hypertension  Morbid obesity with BMI of 70 and over, adult ([facility]  NSTEMI (non-ST elevated myocardial infarction) ([facility]  SVT (supraventricular tachycardia) ([facility]) Patient has been seen by [facility] nurse in past visits intertriginous MASD and BLE wounds and buttocks wounds. Currently patient has a new diagnosis of right Breast CA and she has a \"sore\" of unknown etiology on the underside of right breast that is weeping small amounts of purulent drainage and surrounded by strong yeast smell. Unsure if this is part of the biopsy site or a new sore that has been caused by moisture or breast CA. 
 
WOUND POA CONDITIONS Wound Breast Inferior; Lateral;Lower;Right (Active) DRESSING TYPE Open to air 10/22/2018  4:02 PM  
Non-Pressure Injury Partial thickness (epider/derm) 10/22/2018  4:02 PM  
Wound Length (cm) 1 cm 10/22/2018  4:02 PM  
Wound Width (cm) 1 cm 10/22/2018  4:02 PM  
Wound Depth (cm) 0.1 10/22/2018  4:02 PM  
Wound Surface area (cm^2) 1 cm^2 10/22/2018  4:02 PM  
Condition of [place] 10/22/2018  4:02 PM  
Tissue Type Pink 10/22/2018  4:02 PM  
Drainage Amount  Small  10/22/2018  4:02 PM  
Drainage Color Purulent 10/22/2018  4:02 PM  
Wound Odor Strong 10/22/2018  4:02 PM  
Periwound Skin Condition Rash 10/22/2018  4:02 PM  
Cleansing and Cleansing Agents  Dermal wound cleanser 10/22/2018  4:02 PM  
Dressing Type Applied Silver products; Foam 10/22/2018  4:02 PM  
Procedure Tolerated Well 10/22/2018  4:02 PM  
Number of days: 0 Recommend: Right, underside breast wound: daily, cleanse with dermal wound cleanser moist 4x4, cover wound with a small square of aquacell AG and then a small foam dressing.  
 
Nystatin powder under each breast. 
 
Mara Reagan RN, [employer]

## 2018-10-22 NOTE — PROGRESS NOTES
Telephone report received from [facility] in ER. TONO, SBAR, MAR, VS reviewed. 0015 Admitted to [facility] #[id number] from ER. Dual skin assessment completed by this RN and Wali Sees 
 
 Pt has open sore beneath her right breast, weeping a bit. ABD pad placed between skin folds. Excoriated area beneath pannus noted. BLE lymphedema, skin is dry, scaly, crusted, hardened. Pt on 6L Oxygen via nc, saturating only 85-89%. Pt will only lay on her left side. When lying more supine her sats will increase to 90%. Pt states \"I can't lay on my back\". CPAP placed on , saturations improved to >92%. 4420  Lab called, CBC sample is clotted. New order placed to redraw. 0645  Pt took CPAP off, saturations are now 83-88%. Pt refuses to place CPAP back on at this time, stated \"I will after I make a few phone calls. \"  Pt education provided regarding respiratory status, pt continues to be noncompliant. 6710 Lab called this nurse, stating the CBC sample is clotted for the second time. Will reorder and pass on.

## 2018-10-23 LAB
ANION GAP SERPL CALC-SCNC: 4 MMOL/L (ref 5–15)
BUN SERPL-MCNC: 20 MG/DL (ref 6–20)
BUN/CREAT SERPL: 25 (ref 12–20)
CALCIUM SERPL-MCNC: 10 MG/DL (ref 8.5–10.1)
CHLORIDE SERPL-SCNC: 96 MMOL/L (ref 97–108)
CO2 SERPL-SCNC: 36 MMOL/L (ref 21–32)
CREAT SERPL-MCNC: 0.81 MG/DL (ref 0.55–1.02)
ERYTHROCYTE [DISTWIDTH] IN BLOOD BY AUTOMATED COUNT: 12.6 % (ref 11.5–14.5)
GLUCOSE BLD STRIP.AUTO-MCNC: 353 MG/DL (ref 65–100)
GLUCOSE BLD STRIP.AUTO-MCNC: 386 MG/DL (ref 65–100)
GLUCOSE BLD STRIP.AUTO-MCNC: 395 MG/DL (ref 65–100)
GLUCOSE BLD STRIP.AUTO-MCNC: 411 MG/DL (ref 65–100)
GLUCOSE SERPL-MCNC: 357 MG/DL (ref 65–100)
HCT VFR BLD AUTO: 35.3 % (ref 35–47)
HGB BLD-MCNC: 10.9 G/DL (ref 11.5–16)
MCH RBC QN AUTO: 28.8 PG (ref 26–34)
MCHC RBC AUTO-ENTMCNC: 30.9 G/DL (ref 30–36.5)
MCV RBC AUTO: 93.1 FL (ref 80–99)
NRBC # BLD: 0 K/UL (ref 0–0.01)
NRBC BLD-RTO: 0 PER 100 WBC
PLATELET # BLD AUTO: 174 K/UL (ref 150–400)
PMV BLD AUTO: 11.1 FL (ref 8.9–12.9)
POTASSIUM SERPL-SCNC: 4.1 MMOL/L (ref 3.5–5.1)
RBC # BLD AUTO: 3.79 M/UL (ref 3.8–5.2)
SERVICE CMNT-IMP: ABNORMAL
SODIUM SERPL-SCNC: 136 MMOL/L (ref 136–145)
WBC # BLD AUTO: 10.1 K/UL (ref 3.6–11)

## 2018-10-23 PROCEDURE — 74011250637 HC RX REV CODE- 250/637: Performed by: INTERNAL MEDICINE

## 2018-10-23 PROCEDURE — 74011636637 HC RX REV CODE- 636/637: Performed by: INTERNAL MEDICINE

## 2018-10-23 PROCEDURE — 65660000000 HC RM CCU STEPDOWN

## 2018-10-23 PROCEDURE — 94640 AIRWAY INHALATION TREATMENT: CPT

## 2018-10-23 PROCEDURE — 85027 COMPLETE CBC AUTOMATED: CPT

## 2018-10-23 PROCEDURE — 74011000250 HC RX REV CODE- 250: Performed by: INTERNAL MEDICINE

## 2018-10-23 PROCEDURE — 74011636637 HC RX REV CODE- 636/637: Performed by: EMERGENCY MEDICINE

## 2018-10-23 PROCEDURE — 36415 COLL VENOUS BLD VENIPUNCTURE: CPT

## 2018-10-23 PROCEDURE — 74011250637 HC RX REV CODE- 250/637: Performed by: HOSPITALIST

## 2018-10-23 PROCEDURE — 80048 BASIC METABOLIC PNL TOTAL CA: CPT

## 2018-10-23 PROCEDURE — 74011250636 HC RX REV CODE- 250/636: Performed by: INTERNAL MEDICINE

## 2018-10-23 PROCEDURE — 77010033678 HC OXYGEN DAILY

## 2018-10-23 PROCEDURE — 94660 CPAP INITIATION&MGMT: CPT

## 2018-10-23 PROCEDURE — 82962 GLUCOSE BLOOD TEST: CPT

## 2018-10-23 RX ORDER — INSULIN LISPRO 100 [IU]/ML
4 INJECTION, SOLUTION INTRAVENOUS; SUBCUTANEOUS ONCE
Status: COMPLETED | OUTPATIENT
Start: 2018-10-23 | End: 2018-10-23

## 2018-10-23 RX ADMIN — FUROSEMIDE 40 MG: 10 INJECTION, SOLUTION INTRAMUSCULAR; INTRAVENOUS at 22:15

## 2018-10-23 RX ADMIN — METOPROLOL SUCCINATE 25 MG: 50 TABLET, EXTENDED RELEASE ORAL at 09:35

## 2018-10-23 RX ADMIN — NYSTATIN: 100000 POWDER TOPICAL at 18:11

## 2018-10-23 RX ADMIN — HUMAN INSULIN 15 UNITS: 100 INJECTION, SUSPENSION SUBCUTANEOUS at 22:16

## 2018-10-23 RX ADMIN — CYCLOBENZAPRINE HYDROCHLORIDE 5 MG: 10 TABLET, FILM COATED ORAL at 05:14

## 2018-10-23 RX ADMIN — CYCLOBENZAPRINE HYDROCHLORIDE 5 MG: 10 TABLET, FILM COATED ORAL at 12:29

## 2018-10-23 RX ADMIN — NYSTATIN: 100000 POWDER TOPICAL at 12:36

## 2018-10-23 RX ADMIN — IPRATROPIUM BROMIDE AND ALBUTEROL SULFATE 3 ML: .5; 3 SOLUTION RESPIRATORY (INHALATION) at 19:50

## 2018-10-23 RX ADMIN — INSULIN LISPRO 7 UNITS: 100 INJECTION, SOLUTION INTRAVENOUS; SUBCUTANEOUS at 09:34

## 2018-10-23 RX ADMIN — IPRATROPIUM BROMIDE AND ALBUTEROL SULFATE 3 ML: .5; 3 SOLUTION RESPIRATORY (INHALATION) at 07:29

## 2018-10-23 RX ADMIN — INSULIN LISPRO 10 UNITS: 100 INJECTION, SOLUTION INTRAVENOUS; SUBCUTANEOUS at 12:32

## 2018-10-23 RX ADMIN — HEPARIN SODIUM 7500 UNITS: 5000 INJECTION INTRAVENOUS; SUBCUTANEOUS at 22:35

## 2018-10-23 RX ADMIN — IPRATROPIUM BROMIDE AND ALBUTEROL SULFATE 3 ML: .5; 3 SOLUTION RESPIRATORY (INHALATION) at 01:34

## 2018-10-23 RX ADMIN — FUROSEMIDE 40 MG: 10 INJECTION, SOLUTION INTRAMUSCULAR; INTRAVENOUS at 09:33

## 2018-10-23 RX ADMIN — INSULIN LISPRO 7 UNITS: 100 INJECTION, SOLUTION INTRAVENOUS; SUBCUTANEOUS at 18:10

## 2018-10-23 RX ADMIN — METHYLPREDNISOLONE SODIUM SUCCINATE 40 MG: 125 INJECTION, POWDER, FOR SOLUTION INTRAMUSCULAR; INTRAVENOUS at 22:34

## 2018-10-23 RX ADMIN — IPRATROPIUM BROMIDE AND ALBUTEROL SULFATE 3 ML: .5; 3 SOLUTION RESPIRATORY (INHALATION) at 13:20

## 2018-10-23 RX ADMIN — GUAIFENESIN 600 MG: 600 TABLET, EXTENDED RELEASE ORAL at 18:09

## 2018-10-23 RX ADMIN — AZITHROMYCIN 500 MG: 250 TABLET, FILM COATED ORAL at 09:35

## 2018-10-23 RX ADMIN — Medication 10 ML: at 23:24

## 2018-10-23 RX ADMIN — HEPARIN SODIUM 7500 UNITS: 5000 INJECTION INTRAVENOUS; SUBCUTANEOUS at 18:09

## 2018-10-23 RX ADMIN — FAMOTIDINE 20 MG: 20 TABLET ORAL at 09:35

## 2018-10-23 RX ADMIN — Medication 10 ML: at 18:11

## 2018-10-23 RX ADMIN — ASPIRIN 81 MG CHEWABLE TABLET 81 MG: 81 TABLET CHEWABLE at 09:35

## 2018-10-23 RX ADMIN — Medication: at 18:12

## 2018-10-23 RX ADMIN — INSULIN GLARGINE 35 UNITS: 100 INJECTION, SOLUTION SUBCUTANEOUS at 09:33

## 2018-10-23 RX ADMIN — GUAIFENESIN 600 MG: 600 TABLET, EXTENDED RELEASE ORAL at 09:35

## 2018-10-23 RX ADMIN — HEPARIN SODIUM 7500 UNITS: 5000 INJECTION INTRAVENOUS; SUBCUTANEOUS at 09:33

## 2018-10-23 RX ADMIN — FAMOTIDINE 20 MG: 20 TABLET ORAL at 18:12

## 2018-10-23 RX ADMIN — OXYCODONE HYDROCHLORIDE 5 MG: 5 TABLET ORAL at 09:56

## 2018-10-23 RX ADMIN — GLYBURIDE 5 MG: 5 TABLET ORAL at 09:35

## 2018-10-23 RX ADMIN — HUMAN INSULIN 15 UNITS: 100 INJECTION, SUSPENSION SUBCUTANEOUS at 09:33

## 2018-10-23 RX ADMIN — Medication 10 ML: at 05:13

## 2018-10-23 RX ADMIN — METHYLPREDNISOLONE SODIUM SUCCINATE 40 MG: 125 INJECTION, POWDER, FOR SOLUTION INTRAMUSCULAR; INTRAVENOUS at 09:34

## 2018-10-23 RX ADMIN — INSULIN LISPRO 4 UNITS: 100 INJECTION, SOLUTION INTRAVENOUS; SUBCUTANEOUS at 22:15

## 2018-10-23 NOTE — PROGRESS NOTES
Hospitalist Progress Note NAME: Chevy Dunham :  1970 MRN:  [id number] Assessment / Plan: 
Acute on chronic hypoxemic and hypercarbic respiratory failure POA (baseline 6L O2 and Trilogy at night) Acute on chronic diastolic heart failure POA with anasarca /  Worsening edema Unable to rule out COPD exacerbation Obstructive sleep apnea/obesity hypoventilation on Trilogy home Continue IV lasix Taper Steroids Cont Zithromax Nebs Mucolytics BIPAP as needed She stopped using her Trilogy 3 days ago prior to admission as she could not tolerate having it on when laying down. Palliative care consulted due to advanced GEN and COPD Continue O2 while not on BiPAP Monitor I/Os, daily weight and lytes closely Consult pulmonary 
  
Chronic LE lymphedema / venous stasis Continue lac hydrin lotion / cream 
  
CAD, s/p stents HTN Continue asa, toprol 
  
DM Continue glyburide, lantus with SSI prn 
  
Ex smoker Morbid obesity - due to excess weight  
  
Code Status:   Full code Surrogate Decision Maker: 
  
DVT Prophylaxis:  Heparin SQ 
GI Prophylaxis: not indicated 
   
             
  
Subjective: Pt seen and examined at bedside. Feels the same, Shortness of breath. Overnight events d/w RN  
CHIEF COMPLAINT:  f/u SOB Still w/ some sob. No cp. Less cough. 
  
Review of Systems: 
Symptom Y/N Comments  Symptom Y/N Comments Fever/Chills n   Chest Pain n   
Poor Appetite    Edema Cough    Abdominal Pain n   
Sputum    Joint Pain SOB/RAZO y   Pruritis/Rash Nausea/vomit    Tolerating PT/OT Diarrhea    Tolerating Diet y Constipation    Other Could NOT obtain due to:   
 
Objective: VITALS:  
Last 24hrs VS reviewed since prior progress note. Most recent are: 
Patient Vitals for the past 24 hrs: 
 Temp Pulse Resp BP SpO2  
10/23/18 0936 97.5 °F (36.4 °C) 78 20 125/61 95 % 10/23/18 0729     (!) 89 % 10/23/18 0433 98.1 °F (36.7 °C) 69 20 120/58 96 % 10/23/18 0428     98 % 10/23/18 0133     96 % 10/22/18 2348     98 % 10/22/18 2230 97.6 °F (36.4 °C) 82 20 126/63 93 % 10/22/18 1940     92 % 10/22/18 1915 97.7 °F (36.5 °C) 77 20 121/77 92 % 10/22/18 1634 97.3 °F (36.3 °C) 76 20 118/67 (!) 88 % Intake/Output Summary (Last 24 hours) at 10/23/2018 1445 Last data filed at 10/23/2018 1210 Gross per 24 hour Intake 120 ml Output 2450 ml Net -2330 ml PHYSICAL EXAM: 
General: WD, WN. Alert, cooperative, no acute distress   
EENT:  EOMI. Anicteric sclerae. MMM Resp:  Limited due to body habitat, No apparent wheezing on rales on Exam.  No accessory muscle use CV:  Regular  Rhythm,  ++++ edema GI:  Soft, Non distended, Non tender.  +Bowel sounds Neurologic:  Alert and oriented X 3, normal speech, Psych:   Good insight. Not anxious nor agitated Skin:  +ve chronic venous stasis changes. No jaundice Reviewed most current lab test results and cultures  YES Reviewed most current radiology test results   YES Review and summation of old records today    NO Reviewed patient's current orders and MAR    YES 
PMH/ reviewed - no change compared to H&P 
________________________________________________________________________ Care Plan discussed with: 
  Comments Patient y Family RN y   
Care Manager Consultant     
                 y Multidiciplinary team rounds were held today with , nursing, pharmacist and clinical coordinator. Patient's plan of care was discussed; medications were reviewed and discharge planning was addressed. ________________________________________________________________________ Total NON critical care TIME:  25 Minutes Total CRITICAL CARE TIME Spent:   Minutes non procedure based Comments >50% of visit spent in counseling and coordination of care    
________________________________________________________________________ Lev Gray MD  
 
 Procedures: see electronic medical records for all procedures/Xrays and details which were not copied into this note but were reviewed prior to creation of Plan. LABS: 
I reviewed today's most current labs and imaging studies. Pertinent labs include: 
Recent Labs 10/23/18 
0437 10/20/18 
2114 WBC 10.1 7.2 HGB 10.9* 12.0 HCT 35.3 41.2  144* Recent Labs 10/23/18 
6946 10/22/18 
2752 10/21/18 
6363 10/20/18 
2114  136 135* 138  
K 4.1 4.3 3.9 3.9 CL 96* 96* 94* 95* CO2 36* 39* 34* 40* * 243* 310* 166* BUN 20 16 13 11 CREA 0.81 0.80 0.86 0.67  
CA 10.0 9.9 11.1* 10.3* MG  --   --  1.7  --   
ALB  --   --   --  3.3* TBILI  --   --   --  0.6 SGOT  --   --   --  19 ALT  --   --   --  20 Signed: Lev Gray MD

## 2018-10-23 NOTE — DIABETES MGMT
DTC Progress Note Recommendations/ Comments: Please consider increasing NPH to 25 units Q12 hours and linking the NPH and steroid orders, continue to titrate NPH as needed for elevated BG. BG remains 300s-400s despite addition of NPH yesterday. Current hospital DM medication: glyburide 5mg ac b, lantus 35 units daily and humalog correction, NPH 15 units Q12hrs Chart reviewed on Tamera Murguia. Patient is a 57 y.o. female with known Type 2 Diabetes on lantus 35 units daily and glyburide 5mg ac b at home. A1c:  
Lab Results Component Value Date/Time Hemoglobin A1c 8.4 (H) 10/21/2018 04:36 AM  
 Hemoglobin A1c 8.2 (H) 08/21/2018 02:22 AM  
 
 
Recent Glucose Results:  
Lab Results Component Value Date/Time  (H) 10/23/2018 04:37 AM  
 GLUCPOC 411 (H) 10/23/2018 11:07 AM  
 GLUCPOC 353 (H) 10/23/2018 07:33 AM  
 GLUCPOC 369 (H) 10/22/2018 09:14 PM  
  
 
Lab Results Component Value Date/Time Creatinine 0.81 10/23/2018 04:37 AM  
 
Estimated Creatinine Clearance: 154.4 mL/min (based on SCr of 0.81 mg/dL). Active Orders Diet DIET DIABETIC CONSISTENT CARB Regular PO intake:  
Patient Vitals for the past 72 hrs: 
 % Diet Eaten 10/22/18 1634 100 % 10/22/18 1354 100 % Will continue to follow as needed. Thank you Blas Cano, OMARIN, RN, CDE [facility]

## 2018-10-23 NOTE — PROGRESS NOTES
[facility] Pulmonary Consult Service NotePulmonary, Critical Care, and Sleep Medicine Name: Abelino Mejias MRN: [id number] : 1970 Hospital: [facility] Date: 10/23/2018   Hospital Day: 4 Subjective/Interval History:  
Seen earlier today on rounds. Pt is unstable and acutely ill. Medical records and data reviewed. IMPRESSION:  
1. Acute and chronic hypoxemic and hypercarbic respiratory failure POA (baseline 6L O2 and Trilogy at night) She stopped using her Trilogy 3 days ago prior to admission as she could not tolerate having it on when laying down. 2. Acute on chronic diastolic heart failure POA with anasarca /  Worsening edema 3. Volume overload 4. Obstructive sleep apnea/obesity hypoventilation on Trilogy home-per Dr. Osorio Santana with [facility] 5. Chronic LE lymphedema / venous stasis 6. COPD without evidence of exacerbation- recent acute bronchitis vs bronchopneumonia 7. CAD, s/p stents 8. HTN 9. DM 2; uncontrolled 10. Ex smoker  
11. Morbid obesity - due to excess weight 12. Body mass index is 65.4 kg/m². 15. Multiorgan dysfunction as outlined above: Pt has one or more acute or chronic illnesses with severe exacerbation with progression or side effects of treatment that poses a threat to life or bodily function 14. Additional workup outlined below 15. Pt is requiring Drug therapy requiring intensive monitoring for toxicity 16. Pt is unstable, unpredictable needing inpatient PCU monitoring; is acutely ill and at high risk of sudden decline and decompensation with severe consequenses and continued end organ dysfunction and failure 17. Prognosis guarded RECOMMENDATIONS/PLAN:  
1. PCU 2. Pt unable to get her Trilogy machine here because family does not have transportation 3. Diuresis 4. No need for prednisone or systemic steroids 5.  BIPAP or her home trilogy for non invasive ventilatory life support to prevent worsening respiratory acidosis- will adjust 
6. Pt, Ot in AM 
7. Pt needs bathroom scale at home 8. Her home visiting nurse practitioner Malik Sheffield or Dr. Keyanna Bevaers ( she had falling out withhim in March when her mother ) can coordinate care and ideally should check her weight, diuretics,labs 9. Trilogy per Dr. Soham Corea 10. Supplemental O2 to keep sats > 93% 11. Labs to follow electrolytes, renal function and and blood counts 12. Glucose monitoring and SSI 13. Bronchial hygiene with respiratory therapy techniques, bronchodilators 14. DVT, SUP prophylaxis 15. Commended pt on smoking cessation 16. Pt needs IV fluids with additives and Drug therapy requiring intensive monitoring for toxicity 17. Prescription drug management with home med reconciliation reviewed [x] High complexity decision making was performed [x] See my orders for details Subjective/Initial History:  
 
I was asked by Dr. Hailey Booth  to see Naomi Baxter  a 57 y.o. African American female in consultation for a chief complaint of respiratory failure Excerpts from admission notes as follows:  
 
\" Maryanne Santos is a 57 y.o. female with a history of COPD, GEN/OHS, CHF and DM who presents with progressive SOB. Patient has been feeling short winded for a week now. She endorses cough, beige sputum, worsening leg swelling, orthopnea and nocturnal dyspnea. She is compliant with her lasix, inhaler and Trilogy but she had to stop using her Trilogy this last 3 days when she felt more SOB with it on. She denies CP, fevers, chills, NV or D.   EMS was dispatched and BIPAP support initiated in the ER due to persistent hypoxemia on oxygen. \" 
 
Pt now in PCU. Pt used to see. . 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 Pt lives with nephew.  Pt has been on O2 sincw  and now on 6 LPM. Has heart attack and sees Dr. Iraida Orta. It has beeen over a year since seeing any physician. Sees NP who does home visit, Ramila Gleason who is with [phone] home visiting physicians based in [facility]. Pt had been on CPAP but it broke. Last year Trilogy prescribed by Bill Maxwell. She has not seen him at [facility] for [employer]. kelvin could not even tolerate that. Was not eating much lately. Was here at [facility] last month for three days and sent home on abx and diuretics to treat fluid and pneumonia. Has not smoked since January. Congested but nonproductive cough. Smoked for 30 yrs. 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 Father and mother ill so she quit school to help. Had twins but one never made it home. Son is now age 32. Pt weighs 445. Believes she gained 100 pounds in past 5 years. Severe ARZO and unable to walk much in her home. Pt has been hospitalized at [facility] atleast foru time this yr and has been seen by dr. Sindy Vargas during visits in January, May, July. Pt offered appt to our office but had no transportation and has poor recall of those visits. We did not see her during her last admission. No Known Allergies MAR reviewed and pertinent medications noted or modified as needed Current Facility-Administered Medications Medication  famotidine (PEPCID) tablet 20 mg  
 nystatin (MYCOSTATIN) 100,000 unit/gram powder  cyclobenzaprine (FLEXERIL) tablet 5 mg  insulin NPH (NOVOLIN N, HUMULIN N) injection 15 Units  albuterol-ipratropium (DUO-NEB) 2.5 MG-0.5 MG/3 ML  
 aspirin chewable tablet 81 mg  
 glyBURIDE (DIABETA) tablet 5 mg  insulin glargine (LANTUS) injection 35 Units  metoprolol succinate (TOPROL-XL) XL tablet 25 mg  
 polyethylene glycol (MIRALAX) packet 17 g  
 methylPREDNISolone (PF) (SOLU-MEDROL) injection 40 mg  
 oxyCODONE IR (ROXICODONE) tablet 5 mg  sodium chloride (NS) flush 5-10 mL  sodium chloride (NS) flush 5-10 mL  acetaminophen (TYLENOL) tablet 650 mg  
 naloxone (NARCAN) injection 0.4 mg  
 ondansetron (ZOFRAN) injection 4 mg  heparin (porcine) injection 7,500 Units  insulin lispro (HUMALOG) injection  glucose chewable tablet 16 g  
 dextrose (D50W) injection syrg 12.5-25 g  
 glucagon (GLUCAGEN) injection 1 mg  furosemide (LASIX) injection 40 mg  
 guaiFENesin ER (MUCINEX) tablet 600 mg  
 albuterol-ipratropium (DUO-NEB) 2.5 MG-0.5 MG/3 ML  
 ammonium lactate (LAC-HYDRIN) 12 % lotion Patient PCP: Bree Wiley NP 
PMH:  has a past medical history of Arthritis, Asthma, Breast lump, CAD (coronary artery disease), Congestive heart failure ([facility] COPD (chronic obstructive pulmonary disease) ([facility] Hypertension, Morbid obesity with BMI of 70 and over, adult [facility]), NSTEMI (non-ST elevated myocardial infarction) ([facility] and SVT (supraventricular tachycardia) ([facility] PSH:   has a past surgical history that includes hx orthopaedic; hx other surgical; hx  section; and pr cardiac surg procedure unlist. FHX: family history includes Breast Cancer in her maternal grandmother and paternal grandmother; Heart Disease in her father, mother, and sister. SHX:  reports that she has been smoking cigarettes. She has been smoking about 0.25 packs per day. she has never used smokeless tobacco. She reports that she does not drink alcohol or use drugs. ROS:A comprehensive review of systems was negative except for that written in the HPI. Objective: 
 
Vital Signs: Telemetry:    normal sinus rhythmIntake/Output:  
Visit Vitals /61 (BP 1 Location: Right arm, BP Patient Position: Lying left side) Pulse 78 Temp 97.5 °F (36.4 °C) Resp 20 Ht 5' 7\" (1.702 m) Wt (!) 189.4 kg (417 lb 8.8 oz) SpO2 95% BMI 65.40 kg/m² Temp (24hrs), Av.6 °F (36.4 °C), Min:97.3 °F (36.3 °C), Max:98.1 °F (36.7 °C) O2 Device: Nasal cannula O2 Flow Rate (L/min): 6 l/min Wt Readings from Last 4 Encounters:  
10/23/18 (!) 189.4 kg (417 lb 8.8 oz) 09/27/18 (!) 188.7 kg (416 lb)  
08/23/18 (!) 182.9 kg (403 lb 3 oz) 07/12/18 (!) 191.9 kg (423 lb 1 oz) Intake/Output Summary (Last 24 hours) at 10/23/2018 1558 Last data filed at 10/23/2018 1210 Gross per 24 hour Intake 120 ml Output 2450 ml Net -2330 ml Last shift:      10/23 0701 - 10/23 1900 In: -  
Out: 500 [Urine:500] Last 3 shifts: 10/21 1901 - 10/23 0700 In: [phone] Out: 4832 John Ramirez [[id number]] Physical Exam:  
 General:   female; alert, oriented times 3 and moderately ill; BIPAP/NIV on standby; NC;  
 HEAD: rounded facies EYES: conjunctivae clear. PERRL,  AN Icteric sclerae NOSE: nares normal, no drainage, no nasal flaring, THROAT: mucous membranes dry; Lips, mucosa dry; No Thrush;  crowded airway; tongue midline Neck: Supple, symmetrical, trachea midline,  No accessory mm use; No Stridor/ cuff leak, No goiter or thyroid tenderness LYMPH: No abnormally enlarged lymph nodes. in neck or groin Chest: increased AP diameter, broad Lungs: decreased air exchange Heart: Regular rate and rhythm; 2+ bilateral pedal edema, Abdomen: soft, non-tender, without masses or organomegaly, : [place] Musculoskeletal: NO kyphosis; No spine or CVA tenderness; negative, cyanosis, clubbing; no joint swelling or erythema Neuro: alert; speech fluent ;withdraws to pain; unable to check gait and station;  following simple commands Psych: oriented to time, place and person; No agitation;  normal affect;  
 Skin: Pallor and Warm;  
 Pulses:Bilateral, Radial, 2+ Capillary refill: normal; sluggish capillary refill, pale, 
 
Data:  
 
All lab results for the last 24 hours reviewed. Labs: 
 
Recent Labs 10/23/18 
0437 10/20/18 
2114 WBC 10.1 7.2 HGB 10.9* 12.0  144* Recent Labs 10/23/18 
3890 10/22/18 
1018 10/21/18 
5202 10/20/18 
7881  136 135* 138  
K 4.1 4.3 3.9 3.9 CL 96* 96* 94* 95* CO2 36* 39* 34* 40* * 243* 310* 166* BUN 20 16 13 11 CREA 0.81 0.80 0.86 0.67  
CA 10.0 9.9 11.1* 10.3* MG  --   --  1.7  --   
ALB  --   --   --  3.3* SGOT  --   --   --  19 ALT  --   --   --  20 Recent Labs 10/21/18 
0000 10/20/18 
2100 PH 7.44 7.38  
PCO2 62* 80* PO2 52* 69* HCO3 41* 46* FIO2  --  36 Recent Labs 10/20/18 
2114 TROIQ <0.05 Lab Results Component Value Date/Time BNP 33 01/12/2016 02:54 AM  
  
Lab Results Component Value Date/Time Culture result: MODERATE NORMAL RESPIRATORY AMRIK 09/25/2018 12:25 PM  
 Culture result: MODERATE STAPHYLOCOCCUS AUREUS (A) 08/19/2018 07:20 AM  
 Culture result: FEW ENTEROCOCCUS FAECALIS GROUP D (A) 08/19/2018 07:20 AM  
 Culture result: MODERATE DIPHTHEROIDS . ..(2 COLONY TYPES) (A) 08/19/2018 07:20 AM  
 
Lab Results Component Value Date/Time TSH 2.87 07/09/2018 12:06 AM  
 
 
Imaging: 
 
   
 
Results from Hospital Encounter encounter on 10/20/18 XR CHEST PORT Narrative EXAM:  XR CHEST PORT INDICATION:  SOB 
 
COMPARISON:  9/24/2018 FINDINGS: A portable AP radiograph of the chest was obtained at 2224 hours. There is underpenetration of the film due to the patient's extremely large body 
habitus. There is no pneumothorax or pleural effusion demonstrated. The lungs 
appear clear. The cardiac and mediastinal contours and pulmonary vascularity 
are normal.  The bones and soft tissues are grossly within normal limits. Impression IMPRESSION: No acute findings. This care involved high complexity medical decision making: I personally: · Reviewed the flowsheet and previous days notes · Reviewed and summarized records or history from previous days note or discussions with staff, family · Parenteral controlled substances - Reviewed/ Adjusted / Weaned / Started · High Risk Drug therapy requiring intensive monitoring for toxicity: eg steroids, pressors, antibiotics · Reviewed and/or ordered Clinical lab tests · Reviewed and/or ordered Radiology tests · Reviewed and/or ordered of Medicine tests · Independently visualized radiologic Images · Reviewed the patients ECG / Telemetry · Reviewed and/or adjusted Ventilator / NiPPV settings Nini Sahni MD

## 2018-10-24 LAB
ARTERIAL PATENCY WRIST A: YES
BASE EXCESS BLDA CALC-SCNC: 9.5 MMOL/L
BDY SITE: ABNORMAL
BREATHS.SPONTANEOUS ON VENT: 20
GAS FLOW.O2 O2 DELIVERY SYS: 6 L/MIN
GLUCOSE BLD STRIP.AUTO-MCNC: 276 MG/DL (ref 65–100)
GLUCOSE BLD STRIP.AUTO-MCNC: 293 MG/DL (ref 65–100)
GLUCOSE BLD STRIP.AUTO-MCNC: 298 MG/DL (ref 65–100)
GLUCOSE BLD STRIP.AUTO-MCNC: 338 MG/DL (ref 65–100)
HCO3 BLDA-SCNC: 35 MMOL/L (ref 22–26)
PCO2 BLDA: 49 MMHG (ref 35–45)
PH BLDA: 7.47 [PH] (ref 7.35–7.45)
PO2 BLDA: 95 MMHG (ref 80–100)
SAO2 % BLD: 98 % (ref 92–97)
SAO2% DEVICE SAO2% SENSOR NAME: ABNORMAL
SERVICE CMNT-IMP: ABNORMAL
SPECIMEN SITE: ABNORMAL

## 2018-10-24 PROCEDURE — G8988 SELF CARE GOAL STATUS: HCPCS

## 2018-10-24 PROCEDURE — 97530 THERAPEUTIC ACTIVITIES: CPT

## 2018-10-24 PROCEDURE — 74011250636 HC RX REV CODE- 250/636: Performed by: INTERNAL MEDICINE

## 2018-10-24 PROCEDURE — 65660000000 HC RM CCU STEPDOWN

## 2018-10-24 PROCEDURE — 74011250637 HC RX REV CODE- 250/637: Performed by: INTERNAL MEDICINE

## 2018-10-24 PROCEDURE — G8979 MOBILITY GOAL STATUS: HCPCS

## 2018-10-24 PROCEDURE — 82962 GLUCOSE BLOOD TEST: CPT

## 2018-10-24 PROCEDURE — 97116 GAIT TRAINING THERAPY: CPT

## 2018-10-24 PROCEDURE — G8987 SELF CARE CURRENT STATUS: HCPCS

## 2018-10-24 PROCEDURE — 97162 PT EVAL MOD COMPLEX 30 MIN: CPT

## 2018-10-24 PROCEDURE — 94640 AIRWAY INHALATION TREATMENT: CPT

## 2018-10-24 PROCEDURE — G8978 MOBILITY CURRENT STATUS: HCPCS

## 2018-10-24 PROCEDURE — 94660 CPAP INITIATION&MGMT: CPT

## 2018-10-24 PROCEDURE — 74011000250 HC RX REV CODE- 250: Performed by: INTERNAL MEDICINE

## 2018-10-24 PROCEDURE — 36600 WITHDRAWAL OF ARTERIAL BLOOD: CPT | Performed by: INTERNAL MEDICINE

## 2018-10-24 PROCEDURE — 97535 SELF CARE MNGMENT TRAINING: CPT

## 2018-10-24 PROCEDURE — 74011250637 HC RX REV CODE- 250/637: Performed by: HOSPITALIST

## 2018-10-24 PROCEDURE — 82803 BLOOD GASES ANY COMBINATION: CPT | Performed by: INTERNAL MEDICINE

## 2018-10-24 PROCEDURE — 97165 OT EVAL LOW COMPLEX 30 MIN: CPT

## 2018-10-24 PROCEDURE — 77010033678 HC OXYGEN DAILY

## 2018-10-24 PROCEDURE — 77030011256 HC DRSG MEPILEX <16IN NO BORD MOLN -A

## 2018-10-24 PROCEDURE — G8980 MOBILITY D/C STATUS: HCPCS

## 2018-10-24 PROCEDURE — 74011636637 HC RX REV CODE- 636/637: Performed by: INTERNAL MEDICINE

## 2018-10-24 RX ADMIN — IPRATROPIUM BROMIDE AND ALBUTEROL SULFATE 3 ML: .5; 3 SOLUTION RESPIRATORY (INHALATION) at 13:49

## 2018-10-24 RX ADMIN — INSULIN LISPRO 3 UNITS: 100 INJECTION, SOLUTION INTRAVENOUS; SUBCUTANEOUS at 23:13

## 2018-10-24 RX ADMIN — IPRATROPIUM BROMIDE AND ALBUTEROL SULFATE 3 ML: .5; 3 SOLUTION RESPIRATORY (INHALATION) at 20:59

## 2018-10-24 RX ADMIN — HEPARIN SODIUM 7500 UNITS: 5000 INJECTION INTRAVENOUS; SUBCUTANEOUS at 09:18

## 2018-10-24 RX ADMIN — FUROSEMIDE 40 MG: 10 INJECTION, SOLUTION INTRAMUSCULAR; INTRAVENOUS at 20:33

## 2018-10-24 RX ADMIN — GUAIFENESIN 600 MG: 600 TABLET, EXTENDED RELEASE ORAL at 09:19

## 2018-10-24 RX ADMIN — INSULIN LISPRO 5 UNITS: 100 INJECTION, SOLUTION INTRAVENOUS; SUBCUTANEOUS at 15:12

## 2018-10-24 RX ADMIN — CYCLOBENZAPRINE HYDROCHLORIDE 5 MG: 10 TABLET, FILM COATED ORAL at 20:45

## 2018-10-24 RX ADMIN — OXYCODONE HYDROCHLORIDE 5 MG: 5 TABLET ORAL at 15:21

## 2018-10-24 RX ADMIN — INSULIN LISPRO 5 UNITS: 100 INJECTION, SOLUTION INTRAVENOUS; SUBCUTANEOUS at 17:32

## 2018-10-24 RX ADMIN — INSULIN LISPRO 7 UNITS: 100 INJECTION, SOLUTION INTRAVENOUS; SUBCUTANEOUS at 09:17

## 2018-10-24 RX ADMIN — HUMAN INSULIN 15 UNITS: 100 INJECTION, SUSPENSION SUBCUTANEOUS at 23:13

## 2018-10-24 RX ADMIN — FUROSEMIDE 40 MG: 10 INJECTION, SOLUTION INTRAMUSCULAR; INTRAVENOUS at 09:18

## 2018-10-24 RX ADMIN — NYSTATIN: 100000 POWDER TOPICAL at 12:23

## 2018-10-24 RX ADMIN — ASPIRIN 81 MG CHEWABLE TABLET 81 MG: 81 TABLET CHEWABLE at 09:18

## 2018-10-24 RX ADMIN — INSULIN GLARGINE 35 UNITS: 100 INJECTION, SOLUTION SUBCUTANEOUS at 09:16

## 2018-10-24 RX ADMIN — HEPARIN SODIUM 7500 UNITS: 5000 INJECTION INTRAVENOUS; SUBCUTANEOUS at 15:13

## 2018-10-24 RX ADMIN — GLYBURIDE 5 MG: 5 TABLET ORAL at 09:21

## 2018-10-24 RX ADMIN — IPRATROPIUM BROMIDE AND ALBUTEROL SULFATE 3 ML: .5; 3 SOLUTION RESPIRATORY (INHALATION) at 01:47

## 2018-10-24 RX ADMIN — HUMAN INSULIN 15 UNITS: 100 INJECTION, SUSPENSION SUBCUTANEOUS at 09:17

## 2018-10-24 RX ADMIN — Medication: at 17:46

## 2018-10-24 RX ADMIN — Medication 10 ML: at 15:13

## 2018-10-24 RX ADMIN — NYSTATIN: 100000 POWDER TOPICAL at 17:46

## 2018-10-24 RX ADMIN — HEPARIN SODIUM 7500 UNITS: 5000 INJECTION INTRAVENOUS; SUBCUTANEOUS at 23:14

## 2018-10-24 RX ADMIN — METOPROLOL SUCCINATE 25 MG: 50 TABLET, EXTENDED RELEASE ORAL at 09:19

## 2018-10-24 RX ADMIN — FAMOTIDINE 20 MG: 20 TABLET ORAL at 09:18

## 2018-10-24 RX ADMIN — GUAIFENESIN 600 MG: 600 TABLET, EXTENDED RELEASE ORAL at 17:33

## 2018-10-24 RX ADMIN — METHYLPREDNISOLONE SODIUM SUCCINATE 40 MG: 125 INJECTION, POWDER, FOR SOLUTION INTRAMUSCULAR; INTRAVENOUS at 09:22

## 2018-10-24 RX ADMIN — POLYETHYLENE GLYCOL 3350 8.5 G: 17 POWDER, FOR SOLUTION ORAL at 12:27

## 2018-10-24 RX ADMIN — Medication 10 ML: at 20:34

## 2018-10-24 RX ADMIN — OXYCODONE HYDROCHLORIDE 5 MG: 5 TABLET ORAL at 06:50

## 2018-10-24 RX ADMIN — FAMOTIDINE 20 MG: 20 TABLET ORAL at 17:33

## 2018-10-24 RX ADMIN — Medication: at 12:24

## 2018-10-24 RX ADMIN — METHYLPREDNISOLONE SODIUM SUCCINATE 40 MG: 125 INJECTION, POWDER, FOR SOLUTION INTRAMUSCULAR; INTRAVENOUS at 20:33

## 2018-10-24 RX ADMIN — CYCLOBENZAPRINE HYDROCHLORIDE 5 MG: 10 TABLET, FILM COATED ORAL at 12:22

## 2018-10-24 RX ADMIN — Medication 10 ML: at 23:15

## 2018-10-24 RX ADMIN — Medication 10 ML: at 06:01

## 2018-10-24 NOTE — PROGRESS NOTES
ADULT PROTOCOL: JET AEROSOL ASSESSMENT Patient  Avtar Gimenez     57 y.o.   female     10/24/2018  9:49 AM 
 
Breath Sounds Pre Procedure: Right Breath Sounds: Diminished Left Breath Sounds: Diminished Breath Sounds Post Procedure: Right Breath Sounds: Diminished Left Breath Sounds: Diminished Breathing pattern: Pre procedure Breathing Pattern: Tachypneic Post procedure Breathing Pattern: Regular Heart Rate: Pre procedure Pulse: 67 
         Post procedure Pulse: 80 Resp Rate: Pre procedure Respirations: 26 Post procedure Respirations: 18 
 
     
 
Cough: Pre procedure Cough: Non-productive Post procedure Cough: Non-productive Oxygen: O2 Device: Nasal cannula   6L SpO2: Pre procedure SpO2: 94 % Post procedure SpO2: 93 % Nebulizer Therapy: Current medications Aerosolized Medications: DuoNeb Smoking History: Former Problem List:  
Patient Active Problem List  
Diagnosis Code  CHF (congestive heart failure) ([facility]) I50.9  Malignant essential hypertension I10  
 Asthma J45.909  Obesity hypoventilation syndrome ([facility]) E66.2  Dyspnea R06.00  Chest pressure R07.89  
 Acute on chronic diastolic heart failure ([facility]) I50.33  
 SVT (supraventricular tachycardia) ([facility]) I47.1  NSTEMI (non-ST elevated myocardial infarction) ([facility]) I21.4  
 S/P PTCA (percutaneous transluminal coronary angioplasty) Z98.61  
 Coronary atherosclerosis of native coronary artery I25.10  Hypoxia R09.02  
 Noncompliance with therapeutic plan Z91.11  
 Chest pain R07.9  GERD (gastroesophageal reflux disease) K21.9  Acute respiratory failure with hypoxia and hypercarbia ([facility]) J96.01, J96.02  
 COPD (chronic obstructive pulmonary disease) ([facility]) J44.9  Tobacco abuse Z72.0  Obesity, morbid (more than 100 lbs over ideal weight or BMI > 40) ([facility]) E66.01  
  Cellulitis L03.90  
 SIRS due to infectious process with acute organ dysfunction (HCC) A41.9, R65.20  Sepsis associated hypotension ([facility]) A41.9, I95.9  Gangrenous toe ([address] [id number]  Type II diabetes mellitus, uncontrolled (HCC) E11.65  
 HTN (hypertension) N63  
 Diastolic CHF, chronic ([facility]) I50.32  
 Cough with hemoptysis R04.2  Lymphedema of both lower extremities I89.0  
 CAP (community acquired pneumonia) J18.9  Cellulitis, leg L03.119  
 COPD exacerbation ([facility]) J44.1  Maggot infestation B87.9  
 SOB (shortness of breath) R06.02  Shortness of breath R06.02  
 Multifocal pneumonia J18.9  Acute respiratory failure ([facility]) J96.00  
 Acute on chronic respiratory failure with hypoxia and hypercapnia ([facility]) J96.21, J96.22  
 NSVT (nonsustained ventricular tachycardia) ([facility]) I47.2  Respiratory failure ([facility] [facility] J96.90  Elevated lipase R74.8  Abdominal pain R10.9  Counseling regarding goals of care Z71.89  
 Acute pancreatitis K85.90  Breast cancer ([facility] C50.919  Intertrigo L30.4  PNA (pneumonia) J18.9  Productive cough R05  
 Other fatigue R53.83 Respiratory Therapist: Evette Kowalski V RT

## 2018-10-24 NOTE — DIABETES MGMT
DTC Progress Note Recommendations/ Comments: Pt's BG > 300 mg/dL today. Please consider increasing NPH to 25 units Q12 hours and linking the NPH and steroid orders. Pt required ~ 24 units of correction yesterday and 12 units of correction so far today. Continue to titrate NPH as needed for elevated BG. Current hospital DM medication: glyburide 5mg ac b, lantus 35 units daily and humalog correction, NPH 15 units Q12hrs Chart reviewed on [facility]. Patient is a 57 y.o. female with known Type 2 Diabetes on lantus 35 units daily and glyburide 5mg ac b at home. A1c:  
Lab Results Component Value Date/Time Hemoglobin A1c 8.4 (H) 10/21/2018 04:36 AM  
 Hemoglobin A1c 8.2 (H) 08/21/2018 02:22 AM  
 
 
Recent Glucose Results:  
Lab Results Component Value Date/Time GLUCPOC 276 (H) 10/24/2018 04:19 PM  
 GLUCPOC 298 (H) 10/24/2018 11:35 AM  
 GLUCPOC 338 (H) 10/24/2018 07:45 AM  
  
 
Lab Results Component Value Date/Time Creatinine 0.81 10/23/2018 04:37 AM  
 
Estimated Creatinine Clearance: 152.3 mL/min (based on SCr of 0.81 mg/dL). Active Orders Diet DIET DIABETIC CONSISTENT CARB Regular; 2 GM NA (House Low NA) PO intake:  
Patient Vitals for the past 72 hrs: 
 % Diet Eaten 10/23/18 1638 100 % 10/23/18 1321 100 % 10/23/18 0729 100 % 10/22/18 1634 100 % 10/22/18 1354 100 % Will continue to follow as needed. Thank you Fletcher Richardson [facility]

## 2018-10-24 NOTE — CARDIO/PULMONARY
Cardiopulmonary Rehab Nursing Entry: 
 
Pt is on the CHF bundle list. 
 
Pt visited. Living With Heart Failure book provided during prior visit. This was a follow-up visit to answer questions and reinforce prior teaching re: CHF, S&Ss, medication management, Low NA diet, daily weights and when to call the doctor. Pt reporting that her aides assist her with meal prep and uses a small amount of salt when eating and cooking otherwise pt likes to use pepper to season food. She does eat some fresh foods and canned goods. Advised to rinse off vegetables as an added measure. Pt no longer has access to a scale but, does monitor leg swelling. She is ambulatory short distances inside of her home. She has a pillbox at home that she has not been using recently. She admits to incorrectly taking her medication recently. She plans to resume use of it on d/c back to home. Pt with understanding of recommendations, she verbalizes plans to resume habits on d/c.

## 2018-10-24 NOTE — PROGRESS NOTES
Reason for Readmission:   Patient came in for sob with cough. She was placed on bipap in ed and on the unit. She is on oxygen six liters at home and she also has trilogy machine however she stopped using the trilogy for last 3 days prior to coming to the hospital because she felt more sob with it on. The patient was here last month for three days and was sent home on antibiotics to treat her pneumonia. RRAT Score and Risk Level:   30 Level of Readmission:    1 Care Conference scheduled:   Not at this time however we did order palliative consult. Resources/supports as identified by patient/family:   Patient states her sons check in on her and she has a nephew who stays with her. She has personal care aides who come out seven days a week from 0900 to 1500pm. She is in the process of getting someone to come in the evening/at night. Top Challenges facing patient (as identified by patient/family and CM): Finances/Medication cost?    Patient denies problems getting her medications. Transportation    She states she uses transportation thru her medicaid however sometimes they show up earlier than they are suppose and she does not have her caregiver with her and states she would miss appointments. Encouraged her when she calls to let them know of the specific time so her caregiver will be at her home to go with her. Support system or lack thereof? She has children and  nephew  . Living arrangements? She lives in one story home and states her nephew is there and sometimes he has things to do so he goes out. She states her sons will check in on her. She states the caregiver does the cooking for her and will go with her to her appointments . Self-care/ADLs/Cognition? Patient states the caregiver comes in and does her adl's. Patient is able to feed herself. Patient ambulates without using dme. Current Advanced Directive/Advance Care Plan: On File. Plan for utilizing home health:   She has caregivers she is using at this time and states she is trying to get someone for the evening/night. Likelihood of additional readmission:   Based on comorbidities---high Transition of Care Plan:    Based on readmission, the patient's previous Plan of Care 
 has been evaluated and/or modified. The current Transition of Care Plan is:   Patient has NP Maikol Cadena who does home visits from visiting physicians ([phone]). Encouraged her to continue to see Dr Kenda Siemens who she states is her pulmonologist who she has not been following up with. Palliative consult being initiated in the hospital.  
 
 
 
   
 
Care Management Interventions PCP Verified by CM: Yes Transition of Care Consult (CM Consult): Discharge Planning Discharge Durable Medical Equipment: (Patient has trilogy machine, portable oxygen) Physical Therapy Consult: Yes Occupational Therapy Consult: Yes Current Support Network: Other(She states her nephew is there with her and her sons come by. ) Confirm Follow Up Transport: Family Plan discussed with Pt/Family/Caregiver: Yes Discharge Location Discharge Placement: Home

## 2018-10-24 NOTE — PROGRESS NOTES
Palliative Medicine Consult Gilles: [phone]) Patient Name: Juda Aschoff YOB: 1970 Date of Initial Consult: 10/22/18 Reason for Consult: Care Decisions Requesting Provider: Zoë Negron. Peri Riggs MD 
Primary Care Physician: Matt Weller NP 
 
 SUMMARY:  
Juda Aschoff is a 57 y.o. with a past history of morbid obesity, acute on chronic hypercarbic respiratory failure, COPD, DM, HTN, GEN/OHS on trilogy at home, chronic diastolic heart failure and recent diagnosis of breast cancer, who was admitted on 10/20/2018 from home with a diagnosis of acute on chronic respiratory failure with hypoxia and hypercapnia. Current medical issues leading to Palliative Medicine involvement include: this is the 11th ED visit with 6 resulting admissions in the past 12 months for respiratory issues. She has a h/o breast cancer, pancreatitis, morbid obesity and home bound. Pt has an AMD that I helped her complete 2018. Psychosocial:  w/c bound in rental home, lives alone, has caregivers through [place], and she is seen by \"Visiting Physicians\". Mother  in March, pt has 1 sister and 2 brothers who are all estranged from pt. She had twins, 1  at 10 months and 1 son is alive, adult, Se Valentin. mPOA is , then Antony Valentin. PALLIATIVE DIAGNOSES:  
1. SOB 2. Nocturnal dyspnea 3. Orthopnea 4. Productive cough 5. Chills 6. Edema 7. Wheezing 8. Anasarca 9. Breast cancer 10. Hypoventilation syndrome 11. GEN 12. Acute on chronic diastolic heart failure 13. Care decisions PLAN:  
1. Met with pt and Palliative  Nikhil Huynh: pt confirms that her AMD is up to date, that she has personal care attendants daily and visiting physician care. She wants to continue aggressive care to prolong life.  Pt spent most of the visit talking about the lack of support from her immediate family, feeling that her family has abandoned her since she can no longer work and provide them with money. She is very unhappy not working and spends her days \"looking at my walls. \"  She has lost most of her independence since her Rose Marie Hitesh broke down as she was able to go out and visit friends when she had transportation. 2. Please note that pt recently completed an AMD in 8/2018.  
3. Will continue to provide psychosocial support during this admission. 4. Initial consult note routed to primary continuity provider 5. Communicated plan of care with: Palliative IDT, RN 
 
 
 GOALS OF CARE / TREATMENT PREFERENCES:  
 
GOALS OF CARE: 
Patient/Health Care Proxy Stated Goals: Prolong life Pending TREATMENT PREFERENCES:  
Code Status: Full Code Advance Care Planning:  Matthew Dunbarford [phone] Advance Care Planning 10/22/2018 Patient's Healthcare Decision Maker is: Named in scanned ACP document Primary Decision Maker Name -  
Primary Decision Maker Phone Number -  
Primary Decision Maker Relationship to Patient - Secondary Decision Maker Name - Secondary Decision Maker Phone Number - Secondary Decision Maker Relationship to Patient -  
Confirm Advance Directive Yes, on file Patient Would Like to Complete Advance Directive - Medical Interventions: Full interventions Other Instructions: Other: As far as possible, the palliative care team has discussed with patient / health care proxy about goals of care / treatment preferences for patient. HISTORY:  
 
History obtained from: chart CHIEF COMPLAINT: worsening SOB 
 
HPI/SUBJECTIVE: The patient is:  
[] Verbal and participatory [x] Non-participatory due to: BIPAP 
 
10/20: BIBA with c/o worsening SOB with associated productive cough and chills x 5 days, these sxs keep pt up at night. Pt was inpatient 1 week ago for PNA. Her baseline 02 use is 6L, and she uses a nebulizer.   
 
ED W/U: 
EXAM:  Wheezing, edema, opened sore on right breast 
 LAB:  PC02 80, P02 69, bicarb 46, glucose 166 IMAGING:  CXR no acute changes HOSPITAL COURSE: admitted to PCU for progression of care. 10/24: sitting on side of bed, no complaints. Clinical Pain Assessment (nonverbal scale for severity on nonverbal patients):  
Clinical Pain Assessment Severity: 0 Activity (Movement): Lying quietly, normal position Duration: for how long has pt been experiencing pain (e.g., 2 days, 1 month, years) Frequency: how often pain is an issue (e.g., several times per day, once every few days, constant) FUNCTIONAL ASSESSMENT:  
 
Palliative Performance Scale (PPS): PPS: 30 
 
 
 PSYCHOSOCIAL/SPIRITUAL SCREENING:  
 
Palliative IDT has assessed this patient for cultural preferences / practices and a referral made as appropriate to needs (Cultural Services, Patient Advocacy, Ethics, etc.) Advance Care Planning: 
Advance Care Planning 10/22/2018 Patient's Healthcare Decision Maker is: Named in scanned ACP document Primary Decision Maker Name -  
Primary Decision Maker Phone Number -  
Primary Decision Maker Relationship to Patient - Secondary Decision Maker Name - Secondary Decision Maker Phone Number - Secondary Decision Maker Relationship to Patient -  
Confirm Advance Directive Yes, on file Patient Would Like to Complete Advance Directive - Any spiritual / Voodoo concerns: 
[] Yes /  [x] No 
 
Caregiver Burnout: 
[] Yes /  [] No /  [x] No Caregiver Present Anticipatory grief assessment:  
[x] Normal  / [] Maladaptive ESAS Anxiety: Anxiety: 0 
 
ESAS Depression: Depression: 0 REVIEW OF SYSTEMS:  
 
Positive and pertinent negative findings in ROS are noted above in HPI. The following systems were [x] reviewed / [] unable to be reviewed as noted in HPI Other findings are noted below.  
Systems: constitutional, ears/nose/mouth/throat, respiratory, gastrointestinal, genitourinary, musculoskeletal, integumentary, neurologic, psychiatric, endocrine. Positive findings noted below. Modified ESAS Completed by: provider Fatigue: 0 Drowsiness: 0 Depression: 0 Pain: 0 Anxiety: 0 Nausea: 0 Anorexia: 0 Dyspnea: 0 Constipation: No  
  Stool Occurrence(s): 1 PHYSICAL EXAM:  
 
From RN flowsheet: 
Wt Readings from Last 3 Encounters:  
10/24/18 (!) 409 lb (185.5 kg) 09/27/18 (!) 416 lb (188.7 kg) 08/23/18 (!) 403 lb 3 oz (182.9 kg) Blood pressure 137/66, pulse 80, temperature 97.8 °F (36.6 °C), resp. rate 18, height 5' 7\" (1.702 m), weight (!) 409 lb (185.5 kg), SpO2 94 %. Pain Scale 1: Numeric (0 - 10) Pain Intensity 1: 4 Pain Onset 1: Chronic Pain Location 1: Chest, Back Pain Orientation 1: Medial 
Pain Description 1: Dull Pain Intervention(s) 1: Medication (see MAR) Last bowel movement, if known:  
 
Constitutional: morbidly obese, sitting on side of bed reading news paper, pleasant and cooperative Eyes: pupils equal, anicteric ENMT: no nasal discharge, moist mucous membranes Cardiovascular: regular rhythm, distal pulses intact Respiratory: breathing not labored, symmetric Gastrointestinal: soft non-tender, +bowel sounds Musculoskeletal: no deformity, no tenderness to palpation Skin: warm, dry Neurologic: following commands, moving all extremities Psychiatric: full affect HISTORY:  
 
Active Problems: 
  Acute on chronic respiratory failure with hypoxia and hypercapnia ([facility] (1/30/2018) Productive cough (10/22/2018) Other fatigue (10/22/2018) Past Medical History:  
Diagnosis Date  Arthritis  Asthma  Breast lump  CAD (coronary artery disease)  Congestive heart failure ([address]  COPD (chronic obstructive pulmonary disease) ([facility])  Diabetes ([address]  Hypertension  Morbid obesity with BMI of 70 and over, adult ([address]  NSTEMI (non-ST elevated myocardial infarction) ([address]  SVT (supraventricular tachycardia) ([facility]) Past Surgical History:  
Procedure Laterality Date  CARDIAC SURG PROCEDURE UNLIST Stents  HX  SECTION    
 HX ORTHOPAEDIC    
 HX OTHER SURGICAL    
 cyst removed from back Family History Problem Relation Age of Onset  Heart Disease Mother  Heart Disease Father  Heart Disease Sister  Breast Cancer Maternal Grandmother  Breast Cancer Paternal Grandmother History reviewed, no pertinent family history. Social History Tobacco Use  Smoking status: Current Every Day Smoker Packs/day: 0.25 Types: Cigarettes  Smokeless tobacco: Never Used  Tobacco comment: Patient states \"I  aint smoking no more d*mn cigarettes after yesterday\" 2018 Substance Use Topics  Alcohol use: No  
 
No Known Allergies Current Facility-Administered Medications Medication Dose Route Frequency  famotidine (PEPCID) tablet 20 mg  20 mg Oral BID  nystatin (MYCOSTATIN) 100,000 unit/gram powder   Topical BID  cyclobenzaprine (FLEXERIL) tablet 5 mg  5 mg Oral TID PRN  
 insulin NPH (NOVOLIN N, HUMULIN N) injection 15 Units  15 Units SubCUTAneous Q12H  
 albuterol-ipratropium (DUO-NEB) 2.5 MG-0.5 MG/3 ML  3 mL Nebulization Q6H RT  
 aspirin chewable tablet 81 mg  81 mg Oral DAILY  glyBURIDE (DIABETA) tablet 5 mg  5 mg Oral ACB  insulin glargine (LANTUS) injection 35 Units  35 Units SubCUTAneous DAILY  metoprolol succinate (TOPROL-XL) XL tablet 25 mg  25 mg Oral DAILY  polyethylene glycol (MIRALAX) packet 17 g  17 g Oral DAILY  methylPREDNISolone (PF) (SOLU-MEDROL) injection 40 mg  40 mg IntraVENous Q12H  
 oxyCODONE IR (ROXICODONE) tablet 5 mg  5 mg Oral Q6H PRN  
 sodium chloride (NS) flush 5-10 mL  5-10 mL IntraVENous Q8H  
 sodium chloride (NS) flush 5-10 mL  5-10 mL IntraVENous PRN  
 acetaminophen (TYLENOL) tablet 650 mg  650 mg Oral Q4H PRN  
 naloxone (NARCAN) injection 0.4 mg  0.4 mg IntraVENous PRN  
  ondansetron (ZOFRAN) injection 4 mg  4 mg IntraVENous Q4H PRN  
 heparin (porcine) injection 7,500 Units  7,500 Units SubCUTAneous Q8H  
 insulin lispro (HUMALOG) injection   SubCUTAneous AC&HS  
 glucose chewable tablet 16 g  4 Tab Oral PRN  
 dextrose (D50W) injection syrg 12.5-25 g  12.5-25 g IntraVENous PRN  
 glucagon (GLUCAGEN) injection 1 mg  1 mg IntraMUSCular PRN  
 furosemide (LASIX) injection 40 mg  40 mg IntraVENous Q12H  
 guaiFENesin ER (MUCINEX) tablet 600 mg  600 mg Oral BID  albuterol-ipratropium (DUO-NEB) 2.5 MG-0.5 MG/3 ML  3 mL Nebulization Q4H PRN  
 ammonium lactate (LAC-HYDRIN) 12 % lotion   Topical BID  
 
 
 
 LAB AND IMAGING FINDINGS:  
 
Lab Results Component Value Date/Time WBC 10.1 10/23/2018 04:37 AM  
 HGB 10.9 (L) 10/23/2018 04:37 AM  
 PLATELET 586 81/53/0272 04:37 AM  
 
Lab Results Component Value Date/Time Sodium 136 10/23/2018 04:37 AM  
 Potassium 4.1 10/23/2018 04:37 AM  
 Chloride 96 (L) 10/23/2018 04:37 AM  
 CO2 36 (H) 10/23/2018 04:37 AM  
 BUN 20 10/23/2018 04:37 AM  
 Creatinine 0.81 10/23/2018 04:37 AM  
 Calcium 10.0 10/23/2018 04:37 AM  
 Magnesium 1.7 10/21/2018 04:36 AM  
 Phosphorus 1.4 (L) 09/26/2018 12:41 AM  
  
Lab Results Component Value Date/Time AST (SGOT) 19 10/20/2018 09:14 PM  
 Alk. phosphatase 97 10/20/2018 09:14 PM  
 Protein, total 8.5 (H) 10/20/2018 09:14 PM  
 Albumin 3.3 (L) 10/20/2018 09:14 PM  
 Globulin 5.2 (H) 10/20/2018 09:14 PM  
 
Lab Results Component Value Date/Time INR 1.0 07/09/2018 03:15 AM  
 Prothrombin time 10.2 07/09/2018 03:15 AM  
 aPTT 22.7 07/09/2018 03:15 AM  
  
No results found for: IRON, FE, TIBC, IBCT, PSAT, FERR Lab Results Component Value Date/Time pH 7.47 (H) 10/24/2018 11:31 AM  
 PCO2 49 (H) 10/24/2018 11:31 AM  
 PO2 95 10/24/2018 11:31 AM  
 
No components found for: [place] Lab Results Component Value Date/Time  CK 42 07/09/2018 12:06 AM  
 CK - MB <1.0 07/09/2018 12:06 AM  
  
 
 
   
 
Total time: 45 
Counseling / coordination time, spent as noted above: 35 
> 50% counseling / coordination?: y 
 
Prolonged service was provided for  []30 min   []75 min in face to face time in the presence of the patient, spent as noted above. Time Start:  
Time End:  
Note: this can only be billed with 52269 (initial) or 14355 (follow up). If multiple start / stop times, list each separately.

## 2018-10-24 NOTE — PROGRESS NOTES
Patient put on Bipap PCV mode 14/6 4 40% as the patient stated that she can't tolerate the AVAPS settings that Dr. Zayra Zuluaga ordered.

## 2018-10-24 NOTE — PROGRESS NOTES
[facility] Pulmonary Consult Service NotePulmonary, Critical Care, and Sleep Medicine Name: Elif Lindsay MRN: [id number] : 1970 Hospital: [facility] Date: 10/24/2018   Hospital Day: 5 Subjective/Interval History:  
Seen earlier today on rounds. Pt is unstable and acutely ill. Medical records and data reviewed. She was seen about 9am. 
She was upset by her meal trays. She feels like her breath got acutely worse prior to admission. NO chest pain, no back pain, She feels like the bipap has been helping. IMPRESSION:  
1. Acute and chronic hypoxemic and hypercarbic respiratory failure POA (baseline 6L O2 and Trilogy at night) She stopped using her Trilogy 3 days ago prior to admission as she could not tolerate having it on when laying down. 2. Acute on chronic diastolic heart failure POA with anasarca /  Worsening edema 3. Volume overload 4. Obstructive sleep apnea/obesity hypoventilation on Trilogy home-per Dr. Sher Caballero with [facility] 5. Chronic LE lymphedema / venous stasis 6. COPD without evidence of exacerbation- recent acute bronchitis vs bronchopneumonia 7. CAD, s/p stents 8. HTN 9. DM 2; uncontrolled 10. Ex smoker  
11. Morbid obesity - due to excess weight Body mass index is 64.06 kg/m². 12. Multiorgan dysfunction as outlined above: Pt has one or more acute or chronic illnesses with severe exacerbation with progression or side effects of treatment that poses a threat to life or bodily function 13. Additional workup outlined below 14. Pt is requiring Drug therapy requiring intensive monitoring for toxicity 15. Pt is unstable, unpredictable needing inpatient PCU monitoring; is acutely ill and at high risk of sudden decline and decompensation with severe consequenses and continued end organ dysfunction and failure 16. Prognosis guarded RECOMMENDATIONS/PLAN:  
1. Diuresis 2. No need for prednisone or systemic steroids 3. BIPAP continued for now, she is not able to get her  home trilogy for non invasive ventilatory life support to prevent worsening respiratory acidosis- will adjust 
4. Pt, Ot following. 5. Pt needs bathroom scale at home 6. Her home visiting nurse practitioner Ellen Armijo or Dr. Farida Coto ( she had falling out withhim in March when her mother ) can coordinate care and ideally should check her weight, diuretics,labs 7. Trilogy per Dr. Cristina Chand 8. Supplemental O2 to keep sats > 93% 9. Labs to follow electrolytes, renal function and and blood counts 10. Glucose monitoring and SSI 11. Bronchial hygiene with respiratory therapy techniques, bronchodilators 12. DVT, SUP prophylaxis 13. Commended pt on smoking cessation 14. Pt needs IV fluids with additives and Drug therapy requiring intensive monitoring for toxicity 15. Prescription drug management with home med reconciliation reviewed [x] High complexity decision making was performed [x] See my orders for details Subjective/Initial History:  
 
I was asked by Dr. Tyler Ryder  to see Peace Langston  a 57 y.o.  female in consultation for a chief complaint of respiratory failure Excerpts from admission notes as follows:  
 
\" Gatha Fabry is a 57 y.o. female with a history of COPD, GEN/OHS, CHF and DM who presents with progressive SOB. Patient has been feeling short winded for a week now. She endorses cough, beige sputum, worsening leg swelling, orthopnea and nocturnal dyspnea. She is compliant with her lasix, inhaler and Trilogy but she had to stop using her Trilogy this last 3 days when she felt more SOB with it on. She denies CP, fevers, chills, NV or D.   EMS was dispatched and BIPAP support initiated in the ER due to persistent hypoxemia on oxygen. \" 
 
Pt now in PCU. Pt used to see. .  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 Pt lives with nephew. Pt has been on O2 sincw 2012 and now on 6 LPM. Has heart attack and sees Dr. Parvez Keene. It has beeen over a year since seeing any physician. Sees NP who does home visit, Karen Carlton who is with [phone] home visiting physicians based in [facility]. Pt had been on CPAP but it broke. Last year Trilogy prescribed by Ariadna Kelley. She has not seen him at [facility] for [employer]. kelvin could not even tolerate that. Was not eating much lately. Was here at [facility] last month for three days and sent home on abx and diuretics to treat fluid and pneumonia. Has not smoked since January. Congested but nonproductive cough. Smoked for 30 yrs. 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 Father and mother ill so she quit school to help. Had twins but one never made it home. Son is now age 32. Pt weighs 445. Believes she gained 100 pounds in past 5 years. Severe RAZO and unable to walk much in her home. Pt has been hospitalized at [facility] atleast foru time this yr and has been seen by dr. Anya Figueroa during visits in January, May, July. Pt offered appt to our office but had no transportation and has poor recall of those visits. We did not see her during her last admission. No Known Allergies MAR reviewed and pertinent medications noted or modified as needed Current Facility-Administered Medications Medication  famotidine (PEPCID) tablet 20 mg  
 nystatin (MYCOSTATIN) 100,000 unit/gram powder  cyclobenzaprine (FLEXERIL) tablet 5 mg  insulin NPH (NOVOLIN N, HUMULIN N) injection 15 Units  albuterol-ipratropium (DUO-NEB) 2.5 MG-0.5 MG/3 ML  
 aspirin chewable tablet 81 mg  
 glyBURIDE (DIABETA) tablet 5 mg  insulin glargine (LANTUS) injection 35 Units  metoprolol succinate (TOPROL-XL) XL tablet 25 mg  
 polyethylene glycol (MIRALAX) packet 17 g  methylPREDNISolone (PF) (SOLU-MEDROL) injection 40 mg  
 oxyCODONE IR (ROXICODONE) tablet 5 mg  sodium chloride (NS) flush 5-10 mL  sodium chloride (NS) flush 5-10 mL  acetaminophen (TYLENOL) tablet 650 mg  
 naloxone (NARCAN) injection 0.4 mg  
 ondansetron (ZOFRAN) injection 4 mg  heparin (porcine) injection 7,500 Units  insulin lispro (HUMALOG) injection  glucose chewable tablet 16 g  
 dextrose (D50W) injection syrg 12.5-25 g  
 glucagon (GLUCAGEN) injection 1 mg  furosemide (LASIX) injection 40 mg  
 guaiFENesin ER (MUCINEX) tablet 600 mg  
 albuterol-ipratropium (DUO-NEB) 2.5 MG-0.5 MG/3 ML  
 ammonium lactate (LAC-HYDRIN) 12 % lotion Patient PCP: Harvey Cook NP 
PMH:  has a past medical history of Arthritis, Asthma, Breast lump, CAD (coronary artery disease), Congestive heart failure ([facility] COPD (chronic obstructive pulmonary disease) ([facility] [address] Diabetes ([facility] [address] Hypertension, Morbid obesity with BMI of 70 and over, adult [facility]), NSTEMI (non-ST elevated myocardial infarction) ([facility] [address] and SVT (supraventricular tachycardia) ([facility] [address] PSH:   has a past surgical history that includes hx orthopaedic; hx other surgical; hx  section; and pr cardiac surg procedure unlist. FHX: family history includes Breast Cancer in her maternal grandmother and paternal grandmother; Heart Disease in her father, mother, and sister. SHX:  reports that she has been smoking cigarettes. She has been smoking about 0.25 packs per day. she has never used smokeless tobacco. She reports that she does not drink alcohol or use drugs. ROS:A comprehensive review of systems was negative except for that written in the HPI. Objective: 
 
Vital Signs: Telemetry:    normal sinus rhythmIntake/Output:  
Visit Vitals /66 (BP 1 Location: Right arm) Pulse 80 Temp 97.8 °F (36.6 °C) Resp 18 Ht 5' 7\" (1.702 m) Wt (!) 185.5 kg (409 lb) SpO2 94% BMI 64.06 kg/m² Temp (24hrs), Av °F (36.7 °C), Min:97.5 °F (36.4 °C), Max:98.9 °F (37.2 °C) O2 Device: Nasal cannula O2 Flow Rate (L/min): 6 l/min Wt Readings from Last 4 Encounters:  
10/24/18 (!) 185.5 kg (409 lb)  
18 (!) 188.7 kg (416 lb)  
18 (!) 182.9 kg (403 lb 3 oz) 18 (!) 191.9 kg (423 lb 1 oz) Intake/Output Summary (Last 24 hours) at 10/24/2018 1523 Last data filed at 10/24/2018 3089 Gross per 24 hour Intake 480 ml Output 1250 ml Net -770 ml Last shift:      No intake/output data recorded. Last 3 shifts: 10/22 1901 - 10/24 0700 In: 1440 [P.O.:1440] Out: 4250 [PEWNY:3107] Physical Exam:  
 General:   female; alert, oriented times 3 and moderately ill; BIPAP/NIV on standby; NC is in place. HEAD: rounded facies EYES: conjunctivae clear. PERRL,  AN Icteric sclerae NOSE: nares normal, no drainage, no nasal flaring, THROAT: mucous membranes dry; Lips, mucosa dry; No Thrush;  crowded airway; tongue midline Neck: Supple, symmetrical, trachea midline,  No accessory mm use; No Stridor/ cuff leak, No goiter or thyroid tenderness LYMPH: No abnormally enlarged lymph nodes. in neck or groin Chest: increased AP diameter, broad Lungs: decreased air exchange but clear on auscultation bilaterally. Heart: Regular rate and rhythm; 2+ bilateral pedal edema, Abdomen: soft, non-tender, without masses or organomegaly, : [place] Musculoskeletal: NO kyphosis; No spine or CVA tenderness; negative, cyanosis, clubbing; no joint swelling or erythema Neuro: alert; speech fluent ;withdraws to pain; unable to check gait and station;  following simple commands Psych: oriented to time, place and person; No agitation;  normal affect;  
 Skin: Pallor and Warm;  
 Pulses:Bilateral, Radial, 2+ Capillary refill: normal; sluggish capillary refill, pale, 
 
Data:  
 
All lab results for the last 24 hours reviewed. Labs: Recent Labs 10/23/18 
8958 WBC 10.1 HGB 10.9*  Recent Labs 10/23/18 
4977 10/22/18 
8792  136  
K 4.1 4.3 CL 96* 96* CO2 36* 39* * 243* BUN 20 16 CREA 0.81 0.80 CA 10.0 9.9 Recent Labs 10/24/18 
1131 PH 7.47* PCO2 49* PO2 95 HCO3 35* No results for input(s): CPK, CKNDX, TROIQ in the last 72 hours. No lab exists for component: CPKMB Lab Results Component Value Date/Time BNP 33 01/12/2016 02:54 AM  
  
Lab Results Component Value Date/Time Culture result: MODERATE NORMAL RESPIRATORY AMRIK 09/25/2018 12:25 PM  
 Culture result: MODERATE STAPHYLOCOCCUS AUREUS (A) 08/19/2018 07:20 AM  
 Culture result: FEW ENTEROCOCCUS FAECALIS GROUP D (A) 08/19/2018 07:20 AM  
 Culture result: MODERATE DIPHTHEROIDS . ..(2 COLONY TYPES) (A) 08/19/2018 07:20 AM  
 
Lab Results Component Value Date/Time TSH 2.87 07/09/2018 12:06 AM  
 
 
Imaging: 
 
   
 
Results from Hospital Encounter encounter on 10/20/18 XR CHEST PORT Narrative EXAM:  XR CHEST PORT INDICATION:  SOB 
 
COMPARISON:  9/24/2018 FINDINGS: A portable AP radiograph of the chest was obtained at 2224 hours. There is underpenetration of the film due to the patient's extremely large body 
habitus. There is no pneumothorax or pleural effusion demonstrated. The lungs 
appear clear. The cardiac and mediastinal contours and pulmonary vascularity 
are normal.  The bones and soft tissues are grossly within normal limits. Impression IMPRESSION: No acute findings. This care involved high complexity medical decision making: I personally: · Reviewed the flowsheet and previous days notes · Reviewed and summarized records or history from previous days note or discussions with staff, family · Parenteral controlled substances - Reviewed/ Adjusted / Weaned / Started · High Risk Drug therapy requiring intensive monitoring for toxicity: eg steroids, pressors, antibiotics · Reviewed and/or ordered Clinical lab tests · Reviewed and/or ordered Radiology tests · Reviewed and/or ordered of Medicine tests · Independently visualized radiologic Images · Reviewed the patients ECG / Telemetry · Reviewed and/or adjusted Ventilator / NiPPV settings Fide Hansen MD

## 2018-10-24 NOTE — PROGRESS NOTES
physical Therapy EVALUATION/DISCHARGE Patient: Rancho Dodd (36 y.o. female) Date: 10/24/2018 Primary Diagnosis: Acute on chronic respiratory failure with hypoxia and hypercapnia (HCC) Precautions:   Contact ASSESSMENT : 
Based on the objective data described below, the patient presents with good strength, decreased endurance, good functional mobility, and steady gait following admission for acute on chronic respiratory failure. PTA patient lives alone although has caregivers that check on her daily to assist with bathing and ADLS. Patient reports she ambulates very short distances in her apartment (approximately 20 feet), occasionally furniture walking. She does not have space in her home to use an AD. She wears 6L O2 at baseline and trilogy at night. Currently, patient received sitting on BS, agreeable to PT. Patient required supervision for transfers. She ambulated 30 feet with supervision-independently with safe and steady gait. Patient with wide SHARRON and increased trunk sway likely due to body habitus. O2 sats dropped to 86% on 6L O2 although required less than 1 minute to improve to 90%. Patient reports she feels at her baseline except she has decreased endurance, which is also baseline. Per patient's report, she is fairly sedentary. [facility] discharge as she is more capable of being independent. Skilled physical therapy is not indicated at this time. PLAN : 
Discharge Recommendations: Home Health PT/OT Further Equipment Recommendations for Discharge: see OT note SUBJECTIVE:  
Patient stated [facility] caregiver has to do that.  OBJECTIVE DATA SUMMARY:  
HISTORY:   
Past Medical History:  
Diagnosis Date  Arthritis  Asthma  Breast lump  CAD (coronary artery disease)  Congestive heart failure ([facility] [address]  COPD (chronic obstructive pulmonary disease) (HCC)  Diabetes ([facility] [address]  Hypertension  Morbid obesity with BMI of 70 and over, adult ([facility] [address]  NSTEMI (non-ST elevated myocardial infarction) ([facility] [address]  SVT (supraventricular tachycardia) ([facility]) Past Surgical History:  
Procedure Laterality Date  CARDIAC SURG PROCEDURE UNLIST Stents  HX  SECTION    
 HX ORTHOPAEDIC    
 HX OTHER SURGICAL    
 cyst removed from back Prior Level of Function/Home Situation: patient lives alone although has caregivers that check on her daily to assist with bathing and ADLS. Patient reports she ambulates very short distances in her apartment (approximately 20 feet), occasionally furniture walking. She does not have space in her home to use an AD. She wears 6L O2 at baseline and trilogy at night. Personal factors and/or comorbidities impacting plan of care: morbid obesity Home Situation Home Environment: Private residence # Steps to Enter: 5 Rails to Enter: Yes Hand Rails : Bilateral(too wide) One/Two Story Residence: One story Living Alone: No 
Support Systems: Home care staff, Friends \ neighbors Patient Expects to be Discharged to[de-identified] Private residence Current DME Used/Available at Home: Commode, bedside, Hospital bed, CPAP, Oxygen, portable, Wheelchair, Other (comment), Walker, rollator(scooter) Tub or Shower Type: Tub/Shower combination EXAMINATION/PRESENTATION/DECISION MAKING:  
Critical Behavior: 
Neurologic State: Alert Orientation Level: Oriented X4 Cognition: Follows commands Safety/Judgement: Awareness of environment Hearing: Auditory Auditory Impairment: None Skin:   
Edema: 
Range Of Motion: 
AROM: Generally decreased, functional 
  
  
  
PROM: Within functional limits Strength:   
Strength: Generally decreased, functional 
  
  
  
  
  
  
Tone & Sensation:  
Tone: Normal 
  
  
  
  
Sensation: Intact Coordination: 
Coordination: Within functional limits Vision:  
  
Functional Mobility: 
Bed Mobility: 
Rolling: Independent Supine to Sit: Independent Sit to Supine: Independent Scooting: Independent Transfers: 
Sit to Stand: Supervision Stand to Sit: Supervision Bed to Chair: Supervision Balance:  
Sitting: Intact; Without support Standing: Intact; Without support Ambulation/Gait Training: 
Distance (ft): 30 Feet (ft) Ambulation - Level of Assistance: Independent;Supervision Gait Description (WDL): Exceptions to [facility] Gait Abnormalities: Decreased step clearance;Trunk sway increased(due to body habitus ) Base of Support: Widened Speed/Danay: Pace decreased (<100 feet/min) Step Length: Left shortened;Right shortened Stairs: Therapeutic Exercises:  
 
 
Functional Measure: 
Barthel Index: 
 
Bathin Bladder: 10 Bowels: 10 
Groomin Dressin Feeding: 10 Mobility: 0 Stairs: 0 Toilet Use: 5 Transfer (Bed to Chair and Back): 10 Total: 55 Barthel and G-code impairment scale: 
Percentage of impairment CH 
0% CI 
1-19% CJ 
20-39% CK 
40-59% CL 
60-79% CM 
80-99% CN 
100% Barthel Score 0-100 100 99-80 79-60 59-40 20-39 1-19 
 0 Barthel Score 0-20 20 17-19 13-16 9-12 5-8 1-4 0 The Barthel ADL Index: Guidelines 1. The index should be used as a record of what a patient does, not as a record of what a patient could do. 2. The main aim is to establish degree of independence from any help, physical or verbal, however minor and for whatever reason. 3. The need for supervision renders the patient not independent. 4. A patient's performance should be established using the best available evidence. Asking the patient, friends/relatives and nurses are the usual sources, but direct observation and common sense are also important. However direct testing is not needed. 5. Usually the patient's performance over the preceding 24-48 hours is important, but occasionally longer periods will be relevant. 6. Middle categories imply that the patient supplies over 50 per cent of the effort. 7. Use of aids to be independent is allowed. Chuck Diaz., Barthel, D.W. (2499). Functional evaluation: the Barthel Index. [address] [facility] (14)2. PEACE Cannon, Calin Murphy., Candi Thomas., [place], [address] (1999). Measuring the change indisability after inpatient rehabilitation; comparison of the responsiveness of the Barthel Index and Functional [place] Measure. Journal of Neurology, Neurosurgery, and Psychiatry, 66(4), 027-157. Greer Barrow, N.[place].A, AGUSTO Zarco, & Kiko Cheema MMARLY. (2004.) Assessment of post-stroke quality of life in cost-effectiveness studies: The usefulness of the Barthel Index and the EuroQoL-5D. [facility], 13, 378-36 G codes: In compliance with CMSs Claims Based Outcome Reporting, the following G-code set was chosen for this patient based on their primary functional limitation being treated: The outcome measure chosen to determine the severity of the functional limitation was the Barthel with a score of 55/100 which was correlated with the impairment scale. ? Mobility - Walking and Moving Around:  
  - CURRENT STATUS: CK - 40%-59% impaired, limited or restricted  - GOAL STATUS: CK - 40%-59% impaired, limited or restricted  - D/C STATUS:  CK - 40%-59% impaired, limited or restricted Physical Therapy Evaluation Charge Determination History Examination Presentation Decision-Making MEDIUM  Complexity : 1-2 comorbidities / personal factors will impact the outcome/ POC  MEDIUM Complexity : 3 Standardized tests and measures addressing body structure, function, activity limitation and / or participation in recreation  MEDIUM Complexity : Evolving with changing characteristics  Other outcome measures Barthel   MEDIUM Based on the above components, the patient evaluation is determined to be of the following complexity level: MEDIUM Pain: 
Pain Scale 1: Numeric (0 - 10) Pain Intensity 1: 6 Activity Tolerance:  
Close to baseline. Please refer to the flowsheet for vital signs taken during this treatment. After treatment:  
[]   Patient left in no apparent distress sitting up in chair 
[x]   Patient left in no apparent distress in bed 
[x]   Call bell left within reach [x]   Nursing notified 
[]   Caregiver present 
[]   Bed alarm activated COMMUNICATION/EDUCATION:  
Communication/Collaboration: 
[x]   Fall prevention education was provided and the patient/caregiver indicated understanding. [x]   Patient/family have participated as able and agree with findings and recommendations. []   Patient is unable to participate in plan of care at this time. Findings and recommendations were discussed with: Occupational Therapist, Registered Nurse and  Thank you for this referral. 
Sarkis Rodriguez, PT, DPT Time Calculation: 31 mins

## 2018-10-24 NOTE — PROGRESS NOTES
Hospitalist Progress Note NAME: Marifer Muller :  1970 MRN:  [id number] Assessment / Plan: 
Acute on chronic hypoxemic and hypercarbic respiratory failure POA (baseline 6L O2 and Trilogy at night) Acute on chronic diastolic heart failure POA with anasarca /  Worsening edema Unable to rule out COPD exacerbation Obstructive sleep apnea/obesity hypoventilation on Trilogy home Continue IV lasix Cont  Steroids Cont Zithromax Nebs Mucolytics BIPAP as needed She stopped using her Trilogy 3 days ago prior to admission as she could not tolerate having it on when laying down. Palliative care consulted due to advanced GEN and COPD Continue O2 while not on BiPAP Monitor I/Os, daily weight and lytes closely Pulmonary consult appreciated. Chronic LE lymphedema / venous stasis Continue lac hydrin lotion / cream 
  
CAD, s/p stents HTN Continue asa, toprol 
  
DM Continue glyburide, lantus with SSI prn 
  
Ex smoker Morbid obesity - due to excess weight Plan: As above. Hope to dc in 2-3 days. 
  
Code Status:   Full code Surrogate Decision Maker: 
  
DVT Prophylaxis:  Heparin SQ 
GI Prophylaxis: not indicated 
   
             
  
Subjective: Pt seen and examined at bedside. Feels the same, Shortness of breath. Overnight events d/w RN  
CHIEF COMPLAINT:  f/u SOB Sob a little better. No cp/abdp pain. 
  
Review of Systems: 
Symptom Y/N Comments  Symptom Y/N Comments Fever/Chills n   Chest Pain n   
Poor Appetite    Edema Cough    Abdominal Pain n   
Sputum    Joint Pain SOB/RAZO y   Pruritis/Rash Nausea/vomit    Tolerating PT/OT Diarrhea    Tolerating Diet y Constipation    Other Could NOT obtain due to:   
 
Objective: VITALS:  
Last 24hrs VS reviewed since prior progress note. Most recent are: 
Patient Vitals for the past 24 hrs: 
 Temp Pulse Resp BP SpO2  
10/24/18 1349     95 % 10/24/18 1212 97.5 °F (36.4 °C) 68 18 118/58 93 % 10/24/18 0916  67  133/72 92 % 10/24/18 0728 98.7 °F (37.1 °C) 66 18 122/40 93 % 10/24/18 0440     93 % 10/24/18 0415 98.9 °F (37.2 °C) 66 16 114/75 94 % 10/24/18 0206     93 % 10/24/18 0147     94 % 10/23/18 2330 97.9 °F (36.6 °C) 75 16 (!) 140/115 93 % 10/23/18 1950     92 % 10/23/18 1914 97.9 °F (36.6 °C) 74 16 103/78 92 % 10/23/18 1638 97.6 °F (36.4 °C) 70 18 138/60 (!) 89 % Intake/Output Summary (Last 24 hours) at 10/24/2018 1441 Last data filed at 10/24/2018 7531 Gross per 24 hour Intake 480 ml Output 1250 ml Net -770 ml PHYSICAL EXAM: 
General: WD, WN. Alert, cooperative, no acute distress   
EENT:  EOMI. Anicteric sclerae. MMM Resp:  Limited due to body habitat, No apparent wheezing on rales on Exam.  No accessory muscle use CV:  Regular  Rhythm,  ++++ edema GI:  Soft, Non distended, Non tender.  +Bowel sounds Neurologic:  Alert and oriented X 3, normal speech, Psych:   Good insight. Not anxious nor agitated Skin:  +ve chronic venous stasis changes. No jaundice Reviewed most current lab test results and cultures  YES Reviewed most current radiology test results   YES Review and summation of old records today    NO Reviewed patient's current orders and MAR    YES 
PMH/SH reviewed - no change compared to H&P 
________________________________________________________________________ Care Plan discussed with: 
  Comments Patient y Family RN y   
Care Manager Consultant     
                 y Multidiciplinary team rounds were held today with , nursing, pharmacist and clinical coordinator. Patient's plan of care was discussed; medications were reviewed and discharge planning was addressed. ________________________________________________________________________ Total NON critical care TIME:  25 Minutes Total CRITICAL CARE TIME Spent:   Minutes non procedure based Comments >50% of visit spent in counseling and coordination of care    
________________________________________________________________________ Tiana Antoine MD  
 
Procedures: see electronic medical records for all procedures/Xrays and details which were not copied into this note but were reviewed prior to creation of Plan. LABS: 
I reviewed today's most current labs and imaging studies. Pertinent labs include: 
Recent Labs 10/23/18 
7312 WBC 10.1 HGB 10.9* HCT 35.3  Recent Labs 10/23/18 
7575 10/22/18 
9020  136  
K 4.1 4.3 CL 96* 96* CO2 36* 39* * 243* BUN 20 16 CREA 0.81 0.80 CA 10.0 9.9 Signed: Tiana Antoine MD
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
## 2018-10-24 NOTE — PROGRESS NOTES
Problem: Self Care Deficits Care Plan (Adult) Goal: *Acute Goals and Plan of Care (Insert Text) Occupational Therapy Goals Initiated 10/24/2018 1. Patient will perform grooming with supervision/set-up within 7 day(s). 2.  Patient will perform bathing sitting at the sink with moderate assistance  within 7 day(s). 3.  Patient will perform lower body dressing with moderate assistance  within 7 day(s). 4.  Patient will perform toilet transfers with modified independence within 7 day(s). 5.  Patient will perform all aspects of toileting with minimal assistance/contact guard assist within 7 day(s). 6.  Patient will participate in upper extremity therapeutic exercise/activities with supervision/set-up for 5 minutes within 7 day(s). 7.  Patient will utilize energy conservation techniques during functional activities with verbal cues within 7 day(s). Occupational Therapy EVALUATION Patient: Saud Palacios (79 y.o. female) Date: 10/24/2018 Primary Diagnosis: Acute on chronic respiratory failure with hypoxia and hypercapnia (HCC) Precautions:   Contact ASSESSMENT : 
Based on the objective data described below, the patient presents with generalized weakness, decreased endurance, strength, functional mobility and ADLs. Pt was living at home with family and has a caregiver that assists with her ADLs 7 days a week. Pt states that she does not use AD in her home due to lack of space. Pt was cleared to be seen for therapy and all VSS and pt was on 6 liters and states she remains on 6 liters at all times. Pt was sitting on BSC when OT arrived to room and she was able to clean her jae area, but stated that she is not able to clean buttocks and her family or caregiver does the cleaning at home. Pt has functional range and strength is functional in BUE. She was CGA for transfer to bed and was able to walk to the door of the room with CGA.   Educated pt on increasing her over all endurance, and making small goals for pt , pt stated that she understood. Pt was not willing to sit up in recliner and stated that she wanted to lay in bed and rest.  Instructed  Pt to get OOB for each meal and that she needs to be up in recliner more than bed. Recommend that pt return home with home care PT and OT and assist from family. Patient will benefit from skilled intervention to address the above impairments. Patients rehabilitation potential is considered to be Fair Factors which may influence rehabilitation potential include:  
[x]             None noted []             Mental ability/status []             Medical condition []             Home/family situation and support systems []             Safety awareness []             Pain tolerance/management 
[]             Other: PLAN : 
Recommendations and Planned Interventions: 
[x]               Self Care Training                  []        Therapeutic Activities [x]               Functional Mobility Training    []        Cognitive Retraining 
[x]               Therapeutic Exercises           [x]        Endurance Activities []               Balance Training                   []        Neuromuscular Re-Education []               Visual/Perceptual Training     [x]   Home Safety Training 
[x]               Patient Education                 [x]        Family Training/Education []               Other (comment): Frequency/Duration: Patient will be followed by occupational therapy 3 times a week to address goals. Discharge Recommendations: Home Health OT and PT Further Equipment Recommendations for Discharge: tbd SUBJECTIVE:  
Patient stated I walk to the hallway at home. But that's all I can do. Jarocho Stone OBJECTIVE DATA SUMMARY:  
HISTORY:  
Past Medical History:  
Diagnosis Date  Arthritis  Asthma  Breast lump  CAD (coronary artery disease)  Congestive heart failure ([facility]  COPD (chronic obstructive pulmonary disease) ([facility])  Diabetes ([facility]  Hypertension  Morbid obesity with BMI of 70 and over, [facility]  NSTEMI (non-ST elevated myocardial infarction) ([facility]  SVT (supraventricular tachycardia) ([facility]) Past Surgical History:  
Procedure Laterality Date  CARDIAC SURG PROCEDURE UNLIST Stents  HX  SECTION    
 HX ORTHOPAEDIC    
 HX OTHER SURGICAL    
 cyst removed from back Prior Level of Function/Environment/Context: pt lives with family and was able to bathe Ub and complete grooming and has a caregiver that bathes LB and assists her daily. Occupations in which the patient is/was successful, what are the barriers preventing that success:  
Performance Patterns (routines, roles, habits, and rituals):  
Personal Interests and/or values:  
Expanded or extensive additional review of patient history:  
 
Home Situation Home Environment: Private residence # Steps to Enter: 5 Rails to Enter: Yes Hand Rails : Bilateral(too wide) One/Two Story Residence: One story Living Alone: No 
Support Systems: Home care staff, Friends \ neighbors Patient Expects to be Discharged to[de-identified] Private residence Current DME Used/Available at Home: Commode, bedside, Hospital bed, CPAP, Oxygen, portable, Wheelchair, Other (comment), Walker, rollator(scooter) Tub or Shower Type: Tub/Shower combination Hand dominance: RightEXAMINATION OF PERFORMANCE DEFICITS: 
Cognitive/Behavioral Status: 
  
  
  
Perception: Appears intact Perseveration: No perseveration noted Safety/Judgement: Awareness of environment Skin: in poor health Edema: none noted Hearing: Auditory Auditory Impairment: None Vision/Perceptual:   
    
   intact Range of Motion: 
 
AROM: Generally decreased, functional 
PROM: Within functional limits Strength: 
 
Strength: Generally decreased, functional 
  
  
  
  
Coordination: Coordination: Within functional limits Fine Motor Skills-Upper: Left Intact; Right Intact Gross Motor Skills-Upper: Left Intact; Right Intact Tone & Sensation: 
 
Tone: Normal 
Sensation: Intact Balance: 
Sitting: Intact; Without support Standing: Intact; Without support Functional Mobility and Transfers for ADLs:Bed Mobility: 
Rolling: Independent Supine to Sit: Independent Sit to Supine: Independent Scooting: Independent Transfers: 
Sit to Stand: Supervision Stand to Sit: Supervision Bed to Chair: Supervision ADL Assessment: 
Feeding: Independent Oral Facial Hygiene/Grooming: Setup Bathing: Maximum assistance; Moderate assistance Upper Body Dressing: Contact guard assistance;Setup Lower Body Dressing: Maximum assistance Toileting: Total assistance;Maximum assistance Cognitive Retraining Safety/Judgement: Awareness of environment Functional Measure: 
Barthel Index: 
 
Bathin Bladder: 10 Bowels: 10 
Groomin Dressin Feeding: 10 Mobility: 0 Stairs: 0 Toilet Use: 5 Transfer (Bed to Chair and Back): 10 Total: 55 Barthel and G-code impairment scale: 
Percentage of impairment CH 
0% CI 
1-19% CJ 
20-39% CK 
40-59% CL 
60-79% CM 
80-99% CN 
100% Barthel Score 0-100 100 99-80 79-60 59-40 20-39 1-19 
 0 Barthel Score 0-20 20 17-19 13-16 9-12 5-8 1-4 0 The Barthel ADL Index: Guidelines 1. The index should be used as a record of what a patient does, not as a record of what a patient could do. 2. The main aim is to establish degree of independence from any help, physical or verbal, however minor and for whatever reason. 3. The need for supervision renders the patient not independent. 4. A patient's performance should be established using the best available evidence. Asking the patient, friends/relatives and nurses are the usual sources, but direct observation and common sense are also important. However direct testing is not needed. 5. Usually the patient's performance over the preceding 24-48 hours is important, but occasionally longer periods will be relevant. 6. Middle categories imply that the patient supplies over 50 per cent of the effort. 7. Use of aids to be independent is allowed. Doreen Lemus., Barthel, D.W. (5289). Functional evaluation: the Barthel Index. [facility] (14)2. PEACE Nesbitt, Maddy Dumas., Angela Mendez., [place], [facility] (1999). Measuring the change indisability after inpatient rehabilitation; comparison of the responsiveness of the Barthel Index and Functional [place] Measure. Journal of Neurology, Neurosurgery, and Psychiatry, 66(4), 571-292. KRIS Winters, AGUSTO Zarco, & Ryan Acosta M.A. (2004.) Assessment of post-stroke quality of life in cost-effectiveness studies: The usefulness of the Barthel Index and the EuroQoL-5D. [facility], 12, 849-72 G codes: In compliance with CMSs Claims Based Outcome Reporting, the following G-code set was chosen for this patient based on their primary functional limitation being treated: The outcome measure chosen to determine the severity of the functional limitation was the Barthel with a score of 55/100 which was correlated with the impairment scale. ? Self Care:  
  - CURRENT STATUS: CK - 40%-59% impaired, limited or restricted  - GOAL STATUS: CJ - 20%-39% impaired, limited or restricted  - D/C STATUS:  ---------------To be determined--------------- Occupational Therapy Evaluation Charge Determination History Examination Decision-Making MEDIUM Complexity : Expanded review of history including physical, cognitive and psychosocial  history  MEDIUM Complexity : 3-5 performance deficits relating to physical, cognitive , or psychosocial skils that result in activity limitations and / or participation restrictions MEDIUM Complexity : Patient may present with comorbidities that affect occupational performnce. Miniml to moderate modification of tasks or assistance (eg, physical or verbal ) with assesment(s) is necessary to enable patient to complete evaluation Based on the above components, the patient evaluation is determined to be of the following complexity level: MEDIUM Pain: 
Pain Scale 1: Numeric (0 - 10) Pain Intensity 1: 6 Activity Tolerance:  
poor Please refer to the flowsheet for vital signs taken during this treatment. After treatment:  
[] Patient left in no apparent distress sitting up in chair 
[x] Patient left in no apparent distress in bed 
[x] Call bell left within reach [x] Nursing notified 
[] Caregiver present 
[] Bed alarm activated COMMUNICATION/EDUCATION:  
The patients plan of care was discussed with: Physical Therapist and Registered Nurse. [x] Home safety education was provided and the patient/caregiver indicated understanding. [x] Patient/family have participated as able in goal setting and plan of care. [] Patient/family agree to work toward stated goals and plan of care. [x] Patient understands intent and goals of therapy, but is neutral about his/her participation. [] Patient is unable to participate in goal setting and plan of care. This patients plan of care is appropriate for delegation to [facility]. Thank you for this referral. 
Davis Marie OT Time Calculation: 36 mins

## 2018-10-25 LAB
GLUCOSE BLD STRIP.AUTO-MCNC: 299 MG/DL (ref 65–100)
GLUCOSE BLD STRIP.AUTO-MCNC: 312 MG/DL (ref 65–100)
GLUCOSE BLD STRIP.AUTO-MCNC: 316 MG/DL (ref 65–100)
GLUCOSE BLD STRIP.AUTO-MCNC: 319 MG/DL (ref 65–100)
GLUCOSE BLD STRIP.AUTO-MCNC: 332 MG/DL (ref 65–100)
MAGNESIUM SERPL-MCNC: 2.3 MG/DL (ref 1.6–2.4)
PHOSPHATE SERPL-MCNC: 2.6 MG/DL (ref 2.6–4.7)
POTASSIUM SERPL-SCNC: 4.3 MMOL/L (ref 3.5–5.1)
SERVICE CMNT-IMP: ABNORMAL

## 2018-10-25 PROCEDURE — 94640 AIRWAY INHALATION TREATMENT: CPT

## 2018-10-25 PROCEDURE — 74011250637 HC RX REV CODE- 250/637: Performed by: INTERNAL MEDICINE

## 2018-10-25 PROCEDURE — 74011250637 HC RX REV CODE- 250/637: Performed by: HOSPITALIST

## 2018-10-25 PROCEDURE — 74011636637 HC RX REV CODE- 636/637: Performed by: INTERNAL MEDICINE

## 2018-10-25 PROCEDURE — 74011250636 HC RX REV CODE- 250/636: Performed by: INTERNAL MEDICINE

## 2018-10-25 PROCEDURE — 82962 GLUCOSE BLOOD TEST: CPT

## 2018-10-25 PROCEDURE — 65660000000 HC RM CCU STEPDOWN

## 2018-10-25 PROCEDURE — 77030011256 HC DRSG MEPILEX <16IN NO BORD MOLN -A

## 2018-10-25 PROCEDURE — 74011000250 HC RX REV CODE- 250: Performed by: INTERNAL MEDICINE

## 2018-10-25 PROCEDURE — 84132 ASSAY OF SERUM POTASSIUM: CPT

## 2018-10-25 PROCEDURE — 94762 N-INVAS EAR/PLS OXIMTRY CONT: CPT

## 2018-10-25 PROCEDURE — 84100 ASSAY OF PHOSPHORUS: CPT

## 2018-10-25 PROCEDURE — 77010033678 HC OXYGEN DAILY

## 2018-10-25 PROCEDURE — 83735 ASSAY OF MAGNESIUM: CPT

## 2018-10-25 PROCEDURE — 36415 COLL VENOUS BLD VENIPUNCTURE: CPT

## 2018-10-25 PROCEDURE — 94660 CPAP INITIATION&MGMT: CPT

## 2018-10-25 RX ORDER — PREDNISONE 20 MG/1
40 TABLET ORAL
Status: DISCONTINUED | OUTPATIENT
Start: 2018-10-25 | End: 2018-10-26 | Stop reason: HOSPADM

## 2018-10-25 RX ORDER — FUROSEMIDE 40 MG/1
40 TABLET ORAL
Status: DISCONTINUED | OUTPATIENT
Start: 2018-10-25 | End: 2018-10-26 | Stop reason: HOSPADM

## 2018-10-25 RX ADMIN — NYSTATIN: 100000 POWDER TOPICAL at 08:58

## 2018-10-25 RX ADMIN — HEPARIN SODIUM 7500 UNITS: 5000 INJECTION INTRAVENOUS; SUBCUTANEOUS at 17:15

## 2018-10-25 RX ADMIN — HUMAN INSULIN 15 UNITS: 100 INJECTION, SUSPENSION SUBCUTANEOUS at 08:56

## 2018-10-25 RX ADMIN — Medication 10 ML: at 09:09

## 2018-10-25 RX ADMIN — FUROSEMIDE 40 MG: 40 TABLET ORAL at 17:15

## 2018-10-25 RX ADMIN — POLYETHYLENE GLYCOL 3350 17 G: 17 POWDER, FOR SOLUTION ORAL at 08:58

## 2018-10-25 RX ADMIN — IPRATROPIUM BROMIDE AND ALBUTEROL SULFATE 3 ML: .5; 3 SOLUTION RESPIRATORY (INHALATION) at 14:33

## 2018-10-25 RX ADMIN — IPRATROPIUM BROMIDE AND ALBUTEROL SULFATE 3 ML: .5; 3 SOLUTION RESPIRATORY (INHALATION) at 01:50

## 2018-10-25 RX ADMIN — INSULIN LISPRO 4 UNITS: 100 INJECTION, SOLUTION INTRAVENOUS; SUBCUTANEOUS at 21:44

## 2018-10-25 RX ADMIN — GUAIFENESIN 600 MG: 600 TABLET, EXTENDED RELEASE ORAL at 17:15

## 2018-10-25 RX ADMIN — Medication: at 17:19

## 2018-10-25 RX ADMIN — INSULIN LISPRO 7 UNITS: 100 INJECTION, SOLUTION INTRAVENOUS; SUBCUTANEOUS at 17:14

## 2018-10-25 RX ADMIN — INSULIN LISPRO 7 UNITS: 100 INJECTION, SOLUTION INTRAVENOUS; SUBCUTANEOUS at 12:40

## 2018-10-25 RX ADMIN — GLYBURIDE 5 MG: 5 TABLET ORAL at 08:55

## 2018-10-25 RX ADMIN — Medication: at 09:09

## 2018-10-25 RX ADMIN — FAMOTIDINE 20 MG: 20 TABLET ORAL at 08:56

## 2018-10-25 RX ADMIN — IPRATROPIUM BROMIDE AND ALBUTEROL SULFATE 3 ML: .5; 3 SOLUTION RESPIRATORY (INHALATION) at 20:02

## 2018-10-25 RX ADMIN — INSULIN GLARGINE 35 UNITS: 100 INJECTION, SOLUTION SUBCUTANEOUS at 08:57

## 2018-10-25 RX ADMIN — FUROSEMIDE 40 MG: 40 TABLET ORAL at 10:04

## 2018-10-25 RX ADMIN — CYCLOBENZAPRINE HYDROCHLORIDE 5 MG: 10 TABLET, FILM COATED ORAL at 17:19

## 2018-10-25 RX ADMIN — HEPARIN SODIUM 7500 UNITS: 5000 INJECTION INTRAVENOUS; SUBCUTANEOUS at 08:56

## 2018-10-25 RX ADMIN — OXYCODONE HYDROCHLORIDE 5 MG: 5 TABLET ORAL at 08:56

## 2018-10-25 RX ADMIN — HUMAN INSULIN 15 UNITS: 100 INJECTION, SUSPENSION SUBCUTANEOUS at 21:43

## 2018-10-25 RX ADMIN — METOPROLOL SUCCINATE 25 MG: 50 TABLET, EXTENDED RELEASE ORAL at 08:56

## 2018-10-25 RX ADMIN — PREDNISONE 40 MG: 20 TABLET ORAL at 10:04

## 2018-10-25 RX ADMIN — INSULIN LISPRO 5 UNITS: 100 INJECTION, SOLUTION INTRAVENOUS; SUBCUTANEOUS at 08:56

## 2018-10-25 RX ADMIN — GUAIFENESIN 600 MG: 600 TABLET, EXTENDED RELEASE ORAL at 08:55

## 2018-10-25 RX ADMIN — ASPIRIN 81 MG CHEWABLE TABLET 81 MG: 81 TABLET CHEWABLE at 08:55

## 2018-10-25 RX ADMIN — FAMOTIDINE 20 MG: 20 TABLET ORAL at 17:15

## 2018-10-25 RX ADMIN — NYSTATIN: 100000 POWDER TOPICAL at 17:19

## 2018-10-25 RX ADMIN — IPRATROPIUM BROMIDE AND ALBUTEROL SULFATE 3 ML: .5; 3 SOLUTION RESPIRATORY (INHALATION) at 08:15

## 2018-10-25 NOTE — PROGRESS NOTES
Hospitalist Progress Note NAME: Tamika Pacheco :  1970 MRN:  [id number] Assessment / Plan: 
Acute on chronic hypoxemic and hypercarbic respiratory failure POA (baseline 6L O2 and Trilogy at night) Acute on chronic diastolic heart failure POA with anasarca /  Worsening edema Unable to rule out COPD exacerbation Obstructive sleep apnea/obesity hypoventilation on Trilogy home Change lasix to oral. 
Cont  Steroids Cont Zithromax Nebs Mucolytics BIPAP as needed She stopped using her Trilogy 3 days ago prior to admission as she could not tolerate having it on when laying down. Palliative care consulted due to advanced GEN and COPD Continue O2 while not on BiPAP Monitor I/Os, daily weight and lytes closely Pulmonary consult appreciated. Chronic LE lymphedema / venous stasis Continue lac hydrin lotion / cream 
  
CAD, s/p stents HTN Continue asa, toprol 
  
DM Continue glyburide, lantus with SSI prn 
  
Ex smoker Morbid obesity - due to excess weight Plan: As above. Dc home tomorrow if stable. 
  
Code Status:   Full code Surrogate Decision Maker: 
  
DVT Prophylaxis:  Heparin SQ 
GI Prophylaxis: not indicated 
   
             
  
Subjective: Pt seen and examined at bedside. Feels the same, Shortness of breath. Overnight events d/w RN  
CHIEF COMPLAINT:  f/u SOB Feels better. Breathing better. No n/v/abd pain. 
  
Review of Systems: 
Symptom Y/N Comments  Symptom Y/N Comments Fever/Chills n   Chest Pain n   
Poor Appetite    Edema Cough    Abdominal Pain n   
Sputum    Joint Pain SOB/RAZO y   Pruritis/Rash Nausea/vomit    Tolerating PT/OT Diarrhea    Tolerating Diet y Constipation    Other Could NOT obtain due to:   
 
Objective: VITALS:  
Last 24hrs VS reviewed since prior progress note. Most recent are: 
Patient Vitals for the past 24 hrs: 
 Temp Pulse Resp BP SpO2  
10/25/18 0815     93 % 10/25/18 0529 97.8 °F (36.6 °C) 67 16 139/72 92 % 10/25/18 0434     91 % 10/25/18 0150     93 % 10/24/18 2323 97.5 °F (36.4 °C) 77 18 127/55 91 % 10/24/18 2059     95 % 10/24/18 2033  66  120/50   
10/24/18 1927 98.2 °F (36.8 °C) 69 18 114/52 95 % 10/24/18 1501 97.8 °F (36.6 °C) 80 18 137/66 94 % 10/24/18 1349     95 % 10/24/18 1212 97.5 °F (36.4 °C) 68 18 118/58 93 % No intake or output data in the 24 hours ending 10/25/18 1032 PHYSICAL EXAM: 
General: WD, WN. Alert, cooperative, no acute distress   
EENT:  EOMI. Anicteric sclerae. MMM Resp:  Limited due to body habitat, No apparent wheezing on rales on Exam.  No accessory muscle use CV:  Regular  Rhythm,  ++++ edema GI:  Soft, Non distended, Non tender.  +Bowel sounds Neurologic:  Alert and oriented X 3, normal speech, Psych:   Good insight. Not anxious nor agitated Skin:  +ve chronic venous stasis changes. No jaundice Reviewed most current lab test results and cultures  YES Reviewed most current radiology test results   YES Review and summation of old records today    NO Reviewed patient's current orders and MAR    YES 
PMH/ reviewed - no change compared to H&P 
________________________________________________________________________ Care Plan discussed with: 
  Comments Patient y Family RN y   
Care Manager Consultant     
                 y Multidiciplinary team rounds were held today with , nursing, pharmacist and clinical coordinator. Patient's plan of care was discussed; medications were reviewed and discharge planning was addressed. ________________________________________________________________________ Total NON critical care TIME:  25 Minutes Total CRITICAL CARE TIME Spent:   Minutes non procedure based Comments >50% of visit spent in counseling and coordination of care    
________________________________________________________________________ Jocelin Padilla MD  
 
Procedures: see electronic medical records for all procedures/Xrays and details which were not copied into this note but were reviewed prior to creation of Plan. LABS: 
I reviewed today's most current labs and imaging studies. Pertinent labs include: 
Recent Labs 10/23/18 
9343 WBC 10.1 HGB 10.9* HCT 35.3  Recent Labs 10/23/18 
2403   
K 4.1 CL 96* CO2 36* * BUN 20  
CREA 0.81  
CA 10.0 Signed: Jocelin Padilla MD

## 2018-10-25 NOTE — PROGRESS NOTES
Spoke with patient alessandra galarza. She states she had been receiving wound care from [facility] and would like to continue. Spoke with Amisha Anguiano at [facility] and she confirmed patient is open to them for wound care. Referral for resumption of care faxed to her at [phone] with receipt of confirmation. She states they will resume care when patient is discharged.

## 2018-10-25 NOTE — PROGRESS NOTES
[facility] Consult Service NotePulmonary, Critical Care, and Sleep Medicine Name: Juda Aschoff MRN: [id number] : 1970 Hospital: [facility] Date: 10/25/2018   Hospital Day: 6 Subjective/Interval History: She denies any productive cough. Was able to get some sleep last pm.  
She does feel as though she is improving. Reviewed Hospitalist and Palliative notes. I reinforced that she need to re establish her PCP at home. She need to use her trilogy as Rx. Overall she seems to clinically be improving. Seen earlier today on rounds. Pt is unstable and acutely ill. Medical records and data reviewed. She was seen about 9am. 
She was upset by her meal trays. She feels like her breath got acutely worse prior to admission. NO chest pain, no back pain, She feels like the bipap has been helping. IMPRESSION:  
1. Acute and chronic hypoxemic and hypercarbic respiratory failure POA (baseline 6L O2 and Trilogy at night) She stopped using her Trilogy 3 days ago prior to admission as she could not tolerate having it on when laying down. She has been tolerating out bipap. 2. Acute on chronic diastolic heart failure POA with anasarca /  Worsening edema 3. Volume overload 4. Obstructive sleep apnea/obesity hypoventilation on Trilogy home-per Dr. Rochelle Rosas with [facility] 5. Chronic LE lymphedema / venous stasis 6. COPD without evidence of exacerbation- recent acute bronchitis vs bronchopneumonia 7. CAD, s/p stents 8. HTN 9. DM 2; uncontrolled 10. Ex smoker  
11. Morbid obesity - due to excess weight Body mass index is 63.53 kg/m². 12. Multiorgan dysfunction as outlined above: Pt has one or more acute or chronic illnesses with severe exacerbation with progression or side effects of treatment that poses a threat to life or bodily function 13. Additional workup outlined below 14. Pt is requiring Drug therapy requiring intensive monitoring for toxicity 15. Anticipate she can go home tomorrow. RECOMMENDATIONS/PLAN:  
1. Diuresis 2. No need for prednisone or systemic steroids 3. BIPAP continued for now, she is not able to get her  home trilogy for non invasive ventilatory life support to prevent worsening respiratory acidosis- she Will need to get back on her  Trilogy. 4. Pt, Ot following. 5. Pt needs bathroom scale at home 6. Her home visiting nurse practitioner Radha Richardson or Dr. Va Gomez ( she had falling out withhim in March when her mother ) can coordinate care and ideally should check her weight, diuretics,labs 7. Trilogy per Dr. Iraida Wynn 8. Supplemental O2 to keep sats > 93% 9. Labs to follow electrolytes, renal function and and blood counts 10. Glucose monitoring and SSI 11. Bronchial hygiene with respiratory therapy techniques, bronchodilators 12. DVT, SUP prophylaxis 13. Commended pt on smoking cessation 14. Will need CM to help arrange transport home for her. [x] High complexity decision making was performed [x] See my orders for details Subjective/Initial History:  
 
I was asked by Dr. Sydnee Pastrana  to see Tamika Pacheco  a 57 y.o.  female in consultation for a chief complaint of respiratory failure Excerpts from admission notes as follows:  
 
\" Sil Casarez is a 57 y.o. female with a history of COPD, GEN/OHS, CHF and DM who presents with progressive SOB. Patient has been feeling short winded for a week now. She endorses cough, beige sputum, worsening leg swelling, orthopnea and nocturnal dyspnea. She is compliant with her lasix, inhaler and Trilogy but she had to stop using her Trilogy this last 3 days when she felt more SOB with it on. She denies CP, fevers, chills, NV or D.   EMS was dispatched and BIPAP support initiated in the ER due to persistent hypoxemia on oxygen.  \" 
 
 Pt now in PCU. Pt used to see. . 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 Pt lives with nephew. Pt has been on O2 sincw 2012 and now on 6 LPM. Has heart attack and sees Dr. Malka Mckay. It has beeen over a year since seeing any physician. Sees NP who does home visit, Brody Hamlin who is with [phone] home visiting physicians based in [facility]. Pt had been on CPAP but it broke. Last year Trilogy prescribed by Shaylee Williamson. She has not seen him at [facility] for [employer]. kelvin could not even tolerate that. Was not eating much lately. Was here at [facility] last month for three days and sent home on abx and diuretics to treat fluid and pneumonia. Has not smoked since January. Congested but nonproductive cough. Smoked for 30 yrs. 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 Father and mother ill so she quit school to help. Had twins but one never made it home. Son is now age 32. Pt weighs 445. Believes she gained 100 pounds in past 5 years. Severe RAZO and unable to walk much in her home. Pt has been hospitalized at [facility] atleast foru time this yr and has been seen by dr. Tera Montero during visits in January, May, July. Pt offered appt to our office but had no transportation and has poor recall of those visits. We did not see her during her last admission. No Known Allergies MAR reviewed and pertinent medications noted or modified as needed Current Facility-Administered Medications Medication  furosemide (LASIX) tablet 40 mg  
 predniSONE (DELTASONE) tablet 40 mg  
 famotidine (PEPCID) tablet 20 mg  
 nystatin (MYCOSTATIN) 100,000 unit/gram powder  cyclobenzaprine (FLEXERIL) tablet 5 mg  insulin NPH (NOVOLIN N, HUMULIN N) injection 15 Units  albuterol-ipratropium (DUO-NEB) 2.5 MG-0.5 MG/3 ML  
 aspirin chewable tablet 81 mg  
 glyBURIDE (DIABETA) tablet 5 mg  insulin glargine (LANTUS) injection 35 Units  metoprolol succinate (TOPROL-XL) XL tablet 25 mg  
 polyethylene glycol (MIRALAX) packet 17 g  
 oxyCODONE IR (ROXICODONE) tablet 5 mg  sodium chloride (NS) flush 5-10 mL  sodium chloride (NS) flush 5-10 mL  acetaminophen (TYLENOL) tablet 650 mg  
 naloxone (NARCAN) injection 0.4 mg  
 ondansetron (ZOFRAN) injection 4 mg  heparin (porcine) injection 7,500 Units  insulin lispro (HUMALOG) injection  glucose chewable tablet 16 g  
 dextrose (D50W) injection syrg 12.5-25 g  
 glucagon (GLUCAGEN) injection 1 mg  
 guaiFENesin ER (MUCINEX) tablet 600 mg  
 albuterol-ipratropium (DUO-NEB) 2.5 MG-0.5 MG/3 ML  
 ammonium lactate (LAC-HYDRIN) 12 % lotion Patient PCP: Matthew Art NP 
PMH:  has a past medical history of Arthritis, Asthma, Breast lump, CAD (coronary artery disease), Congestive heart failure ([facility] COPD (chronic obstructive pulmonary disease) ([facility] Diabetes ([facility] Hypertension, Morbid obesity with BMI of 70 and over, adult [facility]), NSTEMI (non-ST elevated myocardial infarction) ([facility] and SVT (supraventricular tachycardia) ([facility] PSH:   has a past surgical history that includes hx orthopaedic; hx other surgical; hx  section; and pr cardiac surg procedure unlist. FHX: family history includes Breast Cancer in her maternal grandmother and paternal grandmother; Heart Disease in her father, mother, and sister. SHX:  reports that she has been smoking cigarettes. She has been smoking about 0.25 packs per day. she has never used smokeless tobacco. She reports that she does not drink alcohol or use drugs. ROS:A comprehensive review of systems was negative except for that written in the HPI. Objective: 
 
Vital Signs: Telemetry:    normal sinus rhythmIntake/Output:  
Visit Vitals /69 (BP 1 Location: Right arm, BP Patient Position: At rest) Pulse 69 Temp 98 °F (36.7 °C) Resp 16 Ht 5' 7\" (1.702 m) Wt (!) 184 kg (405 lb 10.3 oz) SpO2 94% BMI 63.53 kg/m² Temp (24hrs), Av.8 °F (36.6 °C), Min:97.5 °F (36.4 °C), Max:98.2 °F (36.8 °C) O2 Device: Nasal cannula O2 Flow Rate (L/min): 6 l/min Wt Readings from Last 4 Encounters:  
10/25/18 (!) 184 kg (405 lb 10.3 oz) 18 (!) 188.7 kg (416 lb)  
18 (!) 182.9 kg (403 lb 3 oz) 18 (!) 191.9 kg (423 lb 1 oz) No intake or output data in the 24 hours ending 10/25/18 1230 Last shift:      No intake/output data recorded. Last 3 shifts: 10/23 1901 - 10/25 0700 In: 120 [P.O.:120] Out: 900 [Urine:500] Physical Exam:  
 General:   female; alert, oriented times 3 and moderately ill; BIPAP/NIV on standby; NC is in place. HEAD: rounded facies EYES: conjunctivae clear. PERRL,  AN Icteric sclerae NOSE: nares normal, no drainage, no nasal flaring, THROAT: mucous membranes dry; Lips, mucosa dry; No Thrush;  crowded airway; tongue midline Neck: Supple, symmetrical, trachea midline,  No accessory mm use; No Stridor/ cuff leak, No goiter or thyroid tenderness LYMPH: No abnormally enlarged lymph nodes. in neck or groin Chest: increased AP diameter, broad Lungs: decreased air exchange but clear on auscultation bilaterally. Breathing comfortably. Heart: Regular rate and rhythm; 2+ bilateral pedal edema, Abdomen: soft, non-tender, without masses or organomegaly, : [place] Musculoskeletal: NO kyphosis; No spine or CVA tenderness; negative, cyanosis, clubbing; no joint swelling or erythema Neuro: alert; speech fluent ;withdraws to pain; unable to check gait and station;  following simple commands Psych: oriented to time, place and person; No agitation;  normal affect;  
 Skin: Pallor and Warm;  
 Pulses:Bilateral, Radial, 2+ Capillary refill: normal; sluggish capillary refill, pale, 
 
Data:  
 
All lab results for the last 24 hours reviewed. Labs: 
 
Recent Labs 10/23/18 
7736 WBC 10.1 HGB 10.9*  
  Recent Labs 10/23/18 
0191   
K 4.1 CL 96* CO2 36* * BUN 20  
CREA 0.81  
CA 10.0 Recent Labs 10/24/18 
1131 PH 7.47* PCO2 49* PO2 95 HCO3 35* No results for input(s): CPK, CKNDX, TROIQ in the last 72 hours. No lab exists for component: CPKMB Lab Results Component Value Date/Time BNP 33 01/12/2016 02:54 AM  
  
Lab Results Component Value Date/Time Culture result: MODERATE NORMAL RESPIRATORY AMRIK 09/25/2018 12:25 PM  
 Culture result: MODERATE STAPHYLOCOCCUS AUREUS (A) 08/19/2018 07:20 AM  
 Culture result: FEW ENTEROCOCCUS FAECALIS GROUP D (A) 08/19/2018 07:20 AM  
 Culture result: MODERATE DIPHTHEROIDS . ..(2 COLONY TYPES) (A) 08/19/2018 07:20 AM  
 
Lab Results Component Value Date/Time TSH 2.87 07/09/2018 12:06 AM  
 
 
Imaging: 
 
   
 
Results from Hospital Encounter encounter on 10/20/18 XR CHEST PORT Narrative EXAM:  XR CHEST PORT INDICATION:  SOB 
 
COMPARISON:  9/24/2018 FINDINGS: A portable AP radiograph of the chest was obtained at 2224 hours. There is underpenetration of the film due to the patient's extremely large body 
habitus. There is no pneumothorax or pleural effusion demonstrated. The lungs 
appear clear. The cardiac and mediastinal contours and pulmonary vascularity 
are normal.  The bones and soft tissues are grossly within normal limits. Impression IMPRESSION: No acute findings. This care involved high complexity medical decision making: I personally: · Reviewed the flowsheet and previous days notes · Reviewed and summarized records or history from previous days note or discussions with staff, family · Parenteral controlled substances - Reviewed/ Adjusted / Weaned / Started · High Risk Drug therapy requiring intensive monitoring for toxicity: eg steroids, pressors, antibiotics · Reviewed and/or ordered Clinical lab tests · Reviewed and/or ordered Radiology tests · Reviewed and/or ordered of Medicine tests · Independently visualized radiologic Images · Reviewed the patients ECG / Telemetry · Reviewed and/or adjusted Ventilator / NiPPV settings Bozena Curran MD

## 2018-10-25 NOTE — DIABETES MGMT
DTC Progress Note Recommendations/ Comments: Noted steroids decreased from IV to PO today, however BG remains elevated. Please consider increasing NPH to 20 units Q12 hours and linking the NPH and steroid orders. Current hospital DM medication: glyburide 5mg ac b, lantus 35 units daily and humalog correction, NPH 15 units Q12hrs Chart reviewed on Mame Landrum. Patient is a 57 y.o. female with known Type 2 Diabetes on lantus 35 units daily and glyburide 5mg ac b at home. A1c:  
Lab Results Component Value Date/Time Hemoglobin A1c 8.4 (H) 10/21/2018 04:36 AM  
 Hemoglobin A1c 8.2 (H) 08/21/2018 02:22 AM  
 
 
Recent Glucose Results:  
Lab Results Component Value Date/Time GLUCPOC 312 (H) 10/25/2018 11:18 AM  
 GLUCPOC 299 (H) 10/25/2018 07:20 AM  
 GLUCPOC 293 (H) 10/24/2018 11:07 PM  
  
 
Lab Results Component Value Date/Time Creatinine 0.81 10/23/2018 04:37 AM  
 
Estimated Creatinine Clearance: 151.5 mL/min (based on SCr of 0.81 mg/dL). Active Orders Diet DIET DIABETIC CONSISTENT CARB Regular; 2 GM NA (House Low NA) PO intake:  
Patient Vitals for the past 72 hrs: 
 % Diet Eaten 10/23/18 1638 100 % 10/23/18 1321 100 % 10/23/18 0729 100 % 10/22/18 1634 100 % Will continue to follow as needed. Thank you Tory Chapa RD, CDE [facility]

## 2018-10-25 NOTE — PROGRESS NOTES
PCU SHIFT NURSING NOTE Bedside and Verbal shift change report given to NORMA Woody (oncoming nurse) by Edward Pfeiffer (offgoing nurse). Report included the following information SBAR, Kardex, Procedure Summary, Intake/Output, MAR, Recent Results, Med Rec Status, Cardiac Rhythm NSR and Alarm Parameters . Shift Summary: 1927 Patient observed in bed watching tv. Patient voiced no c/o pain and/or discomfort. Patient continues on O2 via nasal cannula. Patient shows no s/sx of distress at this time. Patient tolerated meds without difficulty. Patient requested PRN Flexeril at bedtime. Patient received sliding scale coverage for blood sugar at bedtime. Admission Date 10/20/2018 Admission Diagnosis Acute on chronic respiratory failure with hypoxia and hypercapnia (HCC) Consults IP CONSULT TO PALLIATIVE CARE - PROVIDER 
IP CONSULT TO PALLIATIVE CARE - PROVIDER 
IP CONSULT TO PULMONOLOGY Consults []PT []OT []Speech  
[]Case Management  
  
[] Palliative Cardiac Monitoring Order []Yes []No  
 
IV drips []Yes Drip:                            Dose: 
Drip:                            Dose: 
Drip:                            Dose:  
[]No  
 
GI Prophylaxis []Yes []No  
 
 
 
DVT Prophylaxis SCDs:     
     
 Haroon stockings:     
  
[] Medication []Contraindicated []None Activity Level Activity Level: Up with Assistance Activity Assistance: Partial (one person) Purposeful Rounding every 1-2 hour? []Yes Ogden Score  Total Score: 3 Bed Alarm (If score 3 or >) []Yes  
[] Refused (See signed refusal form in chart) Jeffrey Score  Jeffrey Score: 20 Jeffrey Score (if score 14 or less) []PMT consult  
[]Wound Care consult []Specialty bed  
[] Nutrition consult Needs prior to discharge:  
Home O2 required:   
[]Yes []No  
 If yes, how much O2 required? Other:  
 Last Bowel Movement: Last Bowel Movement Date: 10/23/18 Influenza Vaccine Received Flu Vaccine for Current Season (usually Sept-March): No  
 Patient/Guardian Refused (Notify MD): Yes Pneumonia Vaccine Diet Active Orders Diet DIET DIABETIC CONSISTENT CARB Regular; 2 GM NA (House Low NA) LDAs Peripheral IV 10/22/18 Left Forearm (Active) Site Assessment Clean, dry, & intact 10/24/2018  7:27 PM  
Phlebitis Assessment 0 10/24/2018  7:27 PM  
Infiltration Assessment 0 10/24/2018  7:27 PM  
Dressing Status Clean, dry, & intact 10/24/2018  7:27 PM  
Dressing Type Tape;Transparent 10/24/2018  7:27 PM  
Hub Color/Line Status Pink;Capped;Flushed;Patent 10/24/2018  7:27 PM  
Action Taken Open ports on tubing capped 10/24/2018  7:27 PM  
Alcohol Cap Used Yes 10/24/2018  7:27 PM  
                  
Urinary Catheter Intake & Output Readmission Risk Assessment Tool Score High Risk   
      
 30 Total Score 3 Has Seen PCP in Last 6 Months (Yes=3, No=0)  
 11 IP Visits Last 12 Months (1-3=4, 4=9, >4=11) 9 Pt. Coverage (Medicare=5 , Medicaid, or Self-Pay=4) 7 Charlson Comorbidity Score (Age + Comorbid Conditions) Criteria that do not apply:  
 . Living with Significant Other. Assisted Living. LTAC. SNF. or  
Rehab Patient Length of Stay (>5 days = 3) Expected Length of Stay 4d 2h Actual Length of Stay 5

## 2018-10-26 VITALS
WEIGHT: 293 LBS | SYSTOLIC BLOOD PRESSURE: 96 MMHG | OXYGEN SATURATION: 94 % | DIASTOLIC BLOOD PRESSURE: 65 MMHG | TEMPERATURE: 97.5 F | HEART RATE: 71 BPM | RESPIRATION RATE: 18 BRPM | BODY MASS INDEX: 45.99 KG/M2 | HEIGHT: 67 IN

## 2018-10-26 LAB
GLUCOSE BLD STRIP.AUTO-MCNC: 150 MG/DL (ref 65–100)
GLUCOSE BLD STRIP.AUTO-MCNC: 212 MG/DL (ref 65–100)
SERVICE CMNT-IMP: ABNORMAL
SERVICE CMNT-IMP: ABNORMAL

## 2018-10-26 PROCEDURE — 94640 AIRWAY INHALATION TREATMENT: CPT

## 2018-10-26 PROCEDURE — 82962 GLUCOSE BLOOD TEST: CPT

## 2018-10-26 PROCEDURE — 74011250637 HC RX REV CODE- 250/637: Performed by: INTERNAL MEDICINE

## 2018-10-26 PROCEDURE — 74011636637 HC RX REV CODE- 636/637: Performed by: INTERNAL MEDICINE

## 2018-10-26 PROCEDURE — 94660 CPAP INITIATION&MGMT: CPT

## 2018-10-26 PROCEDURE — 74011250636 HC RX REV CODE- 250/636: Performed by: INTERNAL MEDICINE

## 2018-10-26 PROCEDURE — 74011000250 HC RX REV CODE- 250: Performed by: INTERNAL MEDICINE

## 2018-10-26 PROCEDURE — 77010033678 HC OXYGEN DAILY

## 2018-10-26 PROCEDURE — 74011250637 HC RX REV CODE- 250/637: Performed by: HOSPITALIST

## 2018-10-26 RX ORDER — PREDNISONE 20 MG/1
40 TABLET ORAL
Qty: 4 TAB | Refills: 0 | Status: SHIPPED | OUTPATIENT
Start: 2018-10-27 | End: 2018-10-29

## 2018-10-26 RX ADMIN — IPRATROPIUM BROMIDE AND ALBUTEROL SULFATE 3 ML: .5; 3 SOLUTION RESPIRATORY (INHALATION) at 14:10

## 2018-10-26 RX ADMIN — NYSTATIN: 100000 POWDER TOPICAL at 09:00

## 2018-10-26 RX ADMIN — GUAIFENESIN 600 MG: 600 TABLET, EXTENDED RELEASE ORAL at 08:46

## 2018-10-26 RX ADMIN — HEPARIN SODIUM 7500 UNITS: 5000 INJECTION INTRAVENOUS; SUBCUTANEOUS at 08:46

## 2018-10-26 RX ADMIN — OXYCODONE HYDROCHLORIDE 5 MG: 5 TABLET ORAL at 00:05

## 2018-10-26 RX ADMIN — Medication: at 09:00

## 2018-10-26 RX ADMIN — IPRATROPIUM BROMIDE AND ALBUTEROL SULFATE 3 ML: .5; 3 SOLUTION RESPIRATORY (INHALATION) at 08:28

## 2018-10-26 RX ADMIN — FAMOTIDINE 20 MG: 20 TABLET ORAL at 08:46

## 2018-10-26 RX ADMIN — PREDNISONE 40 MG: 20 TABLET ORAL at 08:46

## 2018-10-26 RX ADMIN — INSULIN LISPRO 3 UNITS: 100 INJECTION, SOLUTION INTRAVENOUS; SUBCUTANEOUS at 11:17

## 2018-10-26 RX ADMIN — FUROSEMIDE 40 MG: 40 TABLET ORAL at 08:45

## 2018-10-26 RX ADMIN — INSULIN GLARGINE 35 UNITS: 100 INJECTION, SOLUTION SUBCUTANEOUS at 08:45

## 2018-10-26 RX ADMIN — HUMAN INSULIN 15 UNITS: 100 INJECTION, SUSPENSION SUBCUTANEOUS at 08:45

## 2018-10-26 RX ADMIN — OXYCODONE HYDROCHLORIDE 5 MG: 5 TABLET ORAL at 08:46

## 2018-10-26 RX ADMIN — INSULIN LISPRO 2 UNITS: 100 INJECTION, SOLUTION INTRAVENOUS; SUBCUTANEOUS at 07:30

## 2018-10-26 RX ADMIN — GLYBURIDE 5 MG: 5 TABLET ORAL at 08:45

## 2018-10-26 RX ADMIN — ASPIRIN 81 MG CHEWABLE TABLET 81 MG: 81 TABLET CHEWABLE at 08:46

## 2018-10-26 RX ADMIN — IPRATROPIUM BROMIDE AND ALBUTEROL SULFATE 3 ML: .5; 3 SOLUTION RESPIRATORY (INHALATION) at 03:48

## 2018-10-26 RX ADMIN — METOPROLOL SUCCINATE 25 MG: 50 TABLET, EXTENDED RELEASE ORAL at 08:46

## 2018-10-26 NOTE — PROGRESS NOTES
[facility] Pulmonary Consult Service NotePulmonary, Critical Care, and Sleep Medicine Name: Brody Bonilla MRN: [id number] : 1970 Hospital: [facility] Date: 10/26/2018   Hospital Day: 7 Subjective/Interval History:  
 
Over last 24 hrs. Pt has been doing pretty well. Has felt near baseline. She denies any productive cough. Was able to get some sleep last pm.  
She does feel as though she is improving. Reviewed Hospitalist and Palliative notes. I reinforced that she need to re establish her PCP at home. She need to use her trilogy as Rx. Overall she seems to clinically be improving. Seen earlier today on rounds. Pt is unstable and acutely ill. Medical records and data reviewed. She was seen about 9am. 
She was upset by her meal trays. She feels like her breath got acutely worse prior to admission. NO chest pain, no back pain, She feels like the bipap has been helping. IMPRESSION:  
1. Acute and chronic hypoxemic and hypercarbic respiratory failure POA (baseline 6L O2 and Trilogy at night) She stopped using her Trilogy 3 days ago prior to admission as she could not tolerate having it on when laying down. She has been tolerating out bipap. 2. Acute on chronic diastolic heart failure POA with anasarca /  Worsening edema 3. Volume overload 4. Obstructive sleep apnea/obesity hypoventilation on Trilogy home-per Dr. Avni Villafana with [facility] 5. Chronic LE lymphedema / venous stasis 6. COPD without evidence of exacerbation- recent acute bronchitis vs bronchopneumonia 7. CAD, s/p stents 8. HTN 9. DM 2; uncontrolled 10. Ex smoker  
11. Morbid obesity - due to excess weight Body mass index is 63.88 kg/m². 12. Multiorgan dysfunction as outlined above: Pt has one or more acute or chronic illnesses with severe exacerbation with progression or side effects of treatment that poses a threat to life or bodily function 13. Additional workup outlined below 14. Pt is requiring Drug therapy requiring intensive monitoring for toxicity 15. Pt is going home today. I called her Visiting MD/NP and asked if they can visit next week. They added her to schedule. Scheduled for this Monday. RECOMMENDATIONS/PLAN:  
1. No need for prednisone or systemic steroids 2. BIPAP continued for now, she is not able to get her  home trilogy for non invasive ventilatory life support to prevent worsening respiratory acidosis- she Will need to get back on her  Trilogy at home. 3. Pt, Ot following. 4. Pt needs bathroom scale at home 5. Her home visiting nurse practitioner Regina Vance or Dr. Bisi Porras ( she had falling out withhim in March when her mother ) can coordinate care and ideally should check her weight, diuretics,labs 6. Trilogy per Dr. Avni Villafana 7. Supplemental O2 to keep sats > 93% 8. Labs to follow electrolytes, renal function and and blood counts 9. Glucose monitoring and SSI 10. Bronchial hygiene with respiratory therapy techniques, bronchodilators 11. DVT, SUP prophylaxis 12. Commended pt on smoking cessation 13. Will need CM to help arrange transport home for her. 14. She will follow up with her Home Visit from PCP. [x] High complexity decision making was performed [x] See my orders for details Subjective/Initial History:  
 
I was asked by Dr. Silvio Wall  to see Brody Bonilla  a 57 y.o.  female in consultation for a chief complaint of respiratory failure Excerpts from admission notes as follows:  
 
\" Leticia Hernadez is a 57 y.o. female with a history of COPD, GEN/OHS, CHF and DM who presents with progressive SOB. Patient has been feeling short winded for a week now. She endorses cough, beige sputum, worsening leg swelling, orthopnea and nocturnal dyspnea.   She is compliant with her lasix, inhaler and Trilogy but she had to stop using her Trilogy this last 3 days when she felt more SOB with it on. She denies CP, fevers, chills, NV or D.   EMS was dispatched and BIPAP support initiated in the ER due to persistent hypoxemia on oxygen. \" 
 
Pt now in PCU. Pt used to see. . 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 Pt lives with nephew. Pt has been on O2 sincw 2012 and now on 6 LPM. Has heart attack and sees Dr. Aurelia Graham. It has beeen over a year since seeing any physician. Sees NP who does home visit, Roselia Hernandez who is with [phone] home visiting physicians based in [facility]. Pt had been on CPAP but it broke. Last year Trilogy prescribed by Jeremy Mcdaniel. She has not seen him at [facility] for [employer]. kelvin could not even tolerate that. Was not eating much lately. Was here at [facility] last month for three days and sent home on abx and diuretics to treat fluid and pneumonia. Has not smoked since January. Congested but nonproductive cough. Smoked for 30 yrs. 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 Father and mother ill so she quit school to help. Had twins but one never made it home. Son is now age 32. Pt weighs 445. Believes she gained 100 pounds in past 5 years. Severe RAZO and unable to walk much in her home. Pt has been hospitalized at [facility] atleast foru time this yr and has been seen by dr. Pb Vazquez during visits in January, May, July. Pt offered appt to our office but had no transportation and has poor recall of those visits. We did not see her during her last admission. No Known Allergies MAR reviewed and pertinent medications noted or modified as needed Current Facility-Administered Medications Medication  furosemide (LASIX) tablet 40 mg  
 predniSONE (DELTASONE) tablet 40 mg  
 famotidine (PEPCID) tablet 20 mg  
 nystatin (MYCOSTATIN) 100,000 unit/gram powder  cyclobenzaprine (FLEXERIL) tablet 5 mg  insulin NPH (NOVOLIN N, HUMULIN N) injection 15 Units  albuterol-ipratropium (DUO-NEB) 2.5 MG-0.5 MG/3 ML  
 aspirin chewable tablet 81 mg  
 glyBURIDE (DIABETA) tablet 5 mg  insulin glargine (LANTUS) injection 35 Units  metoprolol succinate (TOPROL-XL) XL tablet 25 mg  
 polyethylene glycol (MIRALAX) packet 17 g  
 oxyCODONE IR (ROXICODONE) tablet 5 mg  sodium chloride (NS) flush 5-10 mL  sodium chloride (NS) flush 5-10 mL  acetaminophen (TYLENOL) tablet 650 mg  
 naloxone (NARCAN) injection 0.4 mg  
 ondansetron (ZOFRAN) injection 4 mg  heparin (porcine) injection 7,500 Units  insulin lispro (HUMALOG) injection  glucose chewable tablet 16 g  
 dextrose (D50W) injection syrg 12.5-25 g  
 glucagon (GLUCAGEN) injection 1 mg  
 guaiFENesin ER (MUCINEX) tablet 600 mg  
 albuterol-ipratropium (DUO-NEB) 2.5 MG-0.5 MG/3 ML  
 ammonium lactate (LAC-HYDRIN) 12 % lotion Patient PCP: Bree Wiley NP 
PMH:  has a past medical history of Arthritis, Asthma, Breast lump, CAD (coronary artery disease), Congestive heart failure ([facility] [address] COPD (chronic obstructive pulmonary disease) ([facility] [address] Diabetes ([facility] [address] Hypertension, Morbid obesity with BMI of 70 and over, adult [facility]), NSTEMI (non-ST elevated myocardial infarction) ([facility] [address] and SVT (supraventricular tachycardia) ([facility] [address] PSH:   has a past surgical history that includes hx orthopaedic; hx other surgical; hx  section; and pr cardiac surg procedure unlist. FHX: family history includes Breast Cancer in her maternal grandmother and paternal grandmother; Heart Disease in her father, mother, and sister. SHX:  reports that she has been smoking cigarettes. She has been smoking about 0.25 packs per day. she has never used smokeless tobacco. She reports that she does not drink alcohol or use drugs. ROS:A comprehensive review of systems was negative except for that written in the HPI. Objective: Vital Signs: Telemetry:    normal sinus rhythmIntake/Output:  
Visit Vitals /85 Pulse 80 Temp 97.6 °F (36.4 °C) Resp 16 Ht 5' 7\" (1.702 m) Wt (!) 185 kg (407 lb 13.6 oz) SpO2 94% BMI 63.88 kg/m² Temp (24hrs), Av.8 °F (36.6 °C), Min:97.5 °F (36.4 °C), Max:98 °F (36.7 °C) O2 Device: Nasal cannula O2 Flow Rate (L/min): 6 l/min Wt Readings from Last 4 Encounters:  
10/26/18 (!) 185 kg (407 lb 13.6 oz)  
18 (!) 188.7 kg (416 lb)  
18 (!) 182.9 kg (403 lb 3 oz) 18 (!) 191.9 kg (423 lb 1 oz) No intake or output data in the 24 hours ending 10/26/18 4434 Last shift:      No intake/output data recorded. Last 3 shifts: No intake/output data recorded. Physical Exam:  
 General:   female; alert, oriented times 3. NC is in place. NO distress. HEAD: rounded facies EYES: conjunctivae clear. PERRL,  AN Icteric sclerae NOSE: nares normal, no drainage, no nasal flaring, THROAT: mucous membranes dry; Lips, mucosa dry; No Thrush;  crowded airway; tongue midline Neck: Supple, symmetrical, trachea midline,  No accessory mm use; No Stridor/ cuff leak, No goiter or thyroid tenderness LYMPH: No abnormally enlarged lymph nodes. in neck or groin Chest: increased AP diameter, broad Lungs: decreased air exchange but clear on auscultation bilaterally. Breathing comfortably. Heart: Regular rate and rhythm; 2+ bilateral pedal edema, Abdomen: soft, non-tender, without masses or organomegaly, : [place] Musculoskeletal: NO kyphosis; No spine or CVA tenderness; negative, cyanosis, clubbing; no joint swelling or erythema Neuro: alert; speech fluent ;withdraws to pain; unable to check gait and station;  following simple commands Psych: oriented to time, place and person; No agitation;  normal affect;  
 Skin: Pallor and Warm;  
 Pulses:Bilateral, Radial, 2+  Capillary refill: normal; sluggish capillary refill, pale, 
 
 Data:  
 
All lab results for the last 24 hours reviewed. Labs: 
 
No results for input(s): WBC, HGB, PLT, INR, APTT, HGBEXT, PLTEXT, HGBEXT, PLTEXT in the last 72 hours. No lab exists for component: FIB, DDMER, INREXT, INREXT Recent Labs 10/25/18 
1615  
K 4.3 MG 2.3 PHOS 2.6 Recent Labs 10/24/18 
1131 PH 7.47* PCO2 49* PO2 95 HCO3 35* No results for input(s): CPK, CKNDX, TROIQ in the last 72 hours. No lab exists for component: CPKMB Lab Results Component Value Date/Time BNP 33 01/12/2016 02:54 AM  
  
Lab Results Component Value Date/Time Culture result: MODERATE NORMAL RESPIRATORY AMRIK 09/25/2018 12:25 PM  
 Culture result: MODERATE STAPHYLOCOCCUS AUREUS (A) 08/19/2018 07:20 AM  
 Culture result: FEW ENTEROCOCCUS FAECALIS GROUP D (A) 08/19/2018 07:20 AM  
 Culture result: MODERATE DIPHTHEROIDS . ..(2 COLONY TYPES) (A) 08/19/2018 07:20 AM  
 
Lab Results Component Value Date/Time TSH 2.87 07/09/2018 12:06 AM  
 
 
Imaging: 
 
   
 
Results from Hospital Encounter encounter on 10/20/18 XR CHEST PORT Narrative EXAM:  XR CHEST PORT INDICATION:  SOB 
 
COMPARISON:  9/24/2018 FINDINGS: A portable AP radiograph of the chest was obtained at 2224 hours. There is underpenetration of the film due to the patient's extremely large body 
habitus. There is no pneumothorax or pleural effusion demonstrated. The lungs 
appear clear. The cardiac and mediastinal contours and pulmonary vascularity 
are normal.  The bones and soft tissues are grossly within normal limits. Impression IMPRESSION: No acute findings. This care involved high complexity medical decision making: I personally: · Reviewed the flowsheet and previous days notes · Reviewed and summarized records or history from previous days note or discussions with staff, family · Parenteral controlled substances - Reviewed/ Adjusted / Weaned / Started · High Risk Drug therapy requiring intensive monitoring for toxicity: eg steroids, pressors, antibiotics · Reviewed and/or ordered Clinical lab tests · Reviewed and/or ordered Radiology tests · Reviewed and/or ordered of Medicine tests · Independently visualized radiologic Images · Reviewed the patients ECG / Telemetry · Reviewed and/or adjusted Ventilator / NiPPV settings Tamanna Covarrubias MD

## 2018-10-26 NOTE — PROGRESS NOTES
Received call from [facility] with [facility] that they are running behind awaiting bariatric equipment and should be here by 1500pm. Informed patient and nursing.

## 2018-10-26 NOTE — PROGRESS NOTES
Problem: Falls - Risk of 
Goal: *Absence of Falls Document Britany Alyse Fall Risk and appropriate interventions in the flowsheet. Outcome: Progressing Towards Goal 
Fall Risk Interventions: 
Mobility Interventions: Assess mobility with egress test, Bed/chair exit alarm, Patient to call before getting OOB, PT Consult for mobility concerns, Strengthening exercises (ROM-active/passive) Medication Interventions: Teach patient to arise slowly Elimination Interventions: Bed/chair exit alarm, Call light in reach, Patient to call for help with toileting needs, Toileting schedule/hourly rounds Problem: Pressure Injury - Risk of 
Goal: *Prevention of pressure injury Document Jeffrey Scale and appropriate interventions in the flowsheet. Outcome: Progressing Towards Goal 
Pressure Injury Interventions: 
Sensory Interventions: Check visual cues for pain Moisture Interventions: Absorbent underpads Activity Interventions: Increase time out of bed Mobility Interventions: PT/OT evaluation Nutrition Interventions: Document food/fluid/supplement intake Friction and Shear Interventions: Apply protective barrier, creams and emollients

## 2018-10-26 NOTE — PROGRESS NOTES
Patient is being discharged to home today with home health services from [facility]. She states her son is aware she is being discharged home today. Informed  Porsha with [facility] of patient's discharge today and for them to resume nursing. Orders were faxed to them on yesterday with confirmation. Patient has notified her personal care aide that she will be discharging today. PCS completed and given to nursing. Referral sent to [facility] and informed them of patient's weight and ht. She has five steps to enter . She will need the oxygen 6 liters with monitoring. Informed them also patient is on isolation and bariatric needed. Patient states it usually takes two ambulances to take her and informed [facility] of that via [employer].

## 2018-10-26 NOTE — ROUTINE PROCESS
Follow up apt scheduled with Chris Delgadillo on 10/28/18 at [phone] will contact the patient for further information. Apt added to AVS.

## 2018-10-26 NOTE — DISCHARGE SUMMARY
Hospitalist Discharge Summary     Patient ID:  Tamika Pacheco  [id number]  36 y.o.  1970    PCP on record: Cornelia Prince NP    Admit date: 10/20/2018  Discharge date and time: 10/26/2018      DISCHARGE DIAGNOSIS:    Acute on chronic hypoxemic and hypercarbic respiratory failure POA (baseline 6L O2 and Trilogy at night)  Acute on chronic diastolic heart failure POA with anasarca /  Worsening edema  Unable to rule out COPD exacerbation  Obstructive sleep apnea/obesity hypoventilation on Trilogy home     Chronic LE lymphedema / venous stasis    CAD, s/p stents  HTN    DM     Ex smoker     Morbid obesity - due to excess weight      CONSULTATIONS:  IP CONSULT TO PALLIATIVE CARE - PROVIDER  IP CONSULT TO PALLIATIVE CARE - PROVIDER  IP CONSULT TO PULMONOLOGY    Excerpted HPI from H&P of Sky Hudson MD:    57 y.o. female with a history of COPD, GEN/OHS, CHF and DM who presents with progressive SOB. Patient has been feeling short winded for a week now. She endorses cough, beige sputum, worsening leg swelling, orthopnea and nocturnal dyspnea. She is compliant with her lasix, inhaler and Trilogy but she had to stop using her Trilogy this last 3 days when she felt more SOB with it on. She denies CP, fevers, chills, NV or D.   EMS was dispatched and BIPAP support initiated in the ER due to persistent hypoxemia on oxygen. ___________________________________________________________________  DISCHARGE SUMMARY/HOSPITAL COURSE:  for full details see H&P, daily progress notes, labs, consult notes. Acute on chronic hypoxemic and hypercarbic respiratory failure POA (baseline 6L O2 and Trilogy at night)  Acute on chronic diastolic heart failure POA with anasarca /  Worsening edema  Unable to rule out COPD exacerbation  Obstructive sleep apnea/obesity hypoventilation on Trilogy home     She received IV lasix changed to oral. IV steroids tapered. Nebs, mucolytics given. Received abx.   BIPAP used as needed. Palliative care consulted due to advanced GEN and COPD  Pulmonary consulted for further recommendations. She should follow up with her new pulmonologist as scheduled. Patient reported that she has appt next week. Resume Trilogy at home.      Chronic LE lymphedema / venous stasis  Continue lac hydrin lotion / cream     CAD, s/p stents  HTN  Continue asa, toprol     DM  Continue glyburide, lantus.     Ex smoker     Morbid obesity - due to excess weight    Patient's breathing was stable at time of discharge. No overnight events. Hospital course uneventful. She was felt to be stable for discharge.   _______________________________________________________________________  Patient seen and examined by me on discharge day. Pertinent Findings:  Gen:    Not in distress  Chest: Clear lungs  CVS:   Regular rhythm.  + bilateral leg edema  Abd:  Soft, not distended, not tender  Neuro:  Alert, oriented  _______________________________________________________________________  DISCHARGE MEDICATIONS:   Discharge Medication List as of 10/26/2018  1:11 PM      START taking these medications    Details   predniSONE (DELTASONE) 20 mg tablet Take 2 Tabs by mouth daily (with breakfast) for 2 days. , Print, Disp-4 Tab, R-0         CONTINUE these medications which have NOT CHANGED    Details   fluticasone-salmeterol (ADVAIR DISKUS) 500-50 mcg/dose diskus inhaler Take 1 Puff by inhalation every twelve (12) hours. , Historical Med      loratadine (CLARITIN) 10 mg tablet Take 10 mg by mouth daily. , Historical Med      ketoconazole (NIZORAL) 2 % topical cream Apply  to affected area daily. , Print, Disp-15 g, R-0      guaiFENesin ER (MUCINEX) 600 mg ER tablet Take 1 Tab by mouth two (2) times a day., Print, Disp-14 Tab, R-0      oxyCODONE IR (ROXICODONE) 5 mg immediate release tablet Take 1 Tab by mouth every four (4) hours as needed (pain 6-10).  Max Daily Amount: 30 mg., Print, Disp-15 Tab, R-0      polyethylene glycol (MIRALAX) 17 gram packet Take 1 Packet by mouth daily. , Print, Disp-30 Packet, R-0      albuterol (VENTOLIN HFA) 90 mcg/actuation inhaler Take 2 Puffs by inhalation every six (6) hours as needed for Wheezing., Historical Med      cyclobenzaprine (FLEXERIL) 5 mg tablet Take 5 mg by mouth three (3) times daily as needed. flexeriol , Historical Med      metoprolol succinate (TOPROL-XL) 25 mg XL tablet Take  by mouth two (2) times a day., Historical Med      furosemide (LASIX) 40 mg tablet Take 40 mg by mouth two (2) times a day. Indications: Peripheral Edema due to Chronic Heart Failure, Historical Med      aspirin 81 mg chewable tablet Take 81 mg by mouth daily. , Historical Med      insulin glargine (LANTUS) 100 unit/mL injection 35 Units by SubCUTAneous route daily. , Historical Med      hydrOXYzine pamoate (VISTARIL) 50 mg capsule Take 50 mg by mouth every eight (8) hours as needed for Itching. Indications: Pruritus of Skin, Historical Med      fluticasone (FLONASE) 50 mcg/actuation nasal spray 2 Sprays by Both Nostrils route daily. , Print, Disp-1 Bottle, R-0      lovastatin (MEVACOR) 40 mg tablet Take 1 Tab by mouth nightly., Normal, Disp-30 Tab, R-6      glyBURIDE (DIABETA) 5 mg tablet Take 5 mg by mouth Daily (before breakfast). , Historical Med      ammonium lactate (LAC-HYDRIN) 12 % topical cream rub in to affected area well, Print, Disp-280 g, R-1      nitroglycerin (NITROSTAT) 0.4 mg SL tablet 1 Tab by SubLINGual route every five (5) minutes as needed for Chest Pain (call 911 if not relieved by 3). , Normal, Disp-25 Tab, R-2             My Recommended Diet, Activity, Wound Care, and follow-up labs are listed in the patient's Discharge Insturctions which I have personally completed and reviewed.     _______________________________________________________________________  DISPOSITION:    Home with Family: x   Home with HH/PT/OT/RN:    SNF/LTC:    LUIS A:    OTHER:        Condition at Discharge: Stable  _______________________________________________________________________  Follow up with:   PCP : Shmuel Hutchins NP  Follow-up Information     Follow up With Specialties Details Why Contact Info    Shmuel Hutchins NP Nurse Practitioner Go on 10/29/2018 Nurse will call you with an appointment time [address] [facility]  [id number] [place] [address] 109   On 10/28/2018 Expect a phone call from [address] [place] [address] to schedule a follow up visit with you in 24-48 hours. If you have questions please Contact #[phone] Visit at [address] [place] [address] and [address] [place] [address] have teamed up to make care upon discharge more convenient. A team of doctors, nurse practitioners and EMTs, that are equipped with all the tools necessary to provide advanced medical care in the comfort of your home.       Divina will resume your nursing wound care that you were receiving prior to coming to the hospital.  [phone]              Total time in minutes spent coordinating this discharge (includes going over instructions, follow-up, prescriptions, and preparing report for sign off to her PCP) :  38 minutes    Signed:  Walker Lyon MD

## 2018-10-26 NOTE — PROGRESS NOTES
PCP LETICIA appt scheduled with Dr. Yany Garibay on 10/29/2018 and nurse will call patient with a appointment time.  Appt added to AVS. ERIK Conn CM Specialist

## 2019-01-02 ENCOUNTER — HOSPITAL ENCOUNTER (INPATIENT)
Age: 49
LOS: 3 days | Discharge: HOME HEALTH | DRG: 189 | End: 2019-01-06
Attending: EMERGENCY MEDICINE | Admitting: FAMILY MEDICINE
Payer: MEDICARE

## 2019-01-02 DIAGNOSIS — R07.9 ACUTE CHEST PAIN: Primary | ICD-10-CM

## 2019-01-02 PROCEDURE — 77030029684 HC NEB SM VOL KT MONA -A

## 2019-01-02 PROCEDURE — 99285 EMERGENCY DEPT VISIT HI MDM: CPT

## 2019-01-03 ENCOUNTER — APPOINTMENT (OUTPATIENT)
Dept: GENERAL RADIOLOGY | Age: 49
DRG: 189 | End: 2019-01-03
Attending: FAMILY MEDICINE
Payer: MEDICARE

## 2019-01-03 ENCOUNTER — APPOINTMENT (OUTPATIENT)
Dept: GENERAL RADIOLOGY | Age: 49
DRG: 189 | End: 2019-01-03
Attending: EMERGENCY MEDICINE
Payer: MEDICARE

## 2019-01-03 PROBLEM — R07.9 ACUTE CHEST PAIN: Status: ACTIVE | Noted: 2019-01-03

## 2019-01-03 LAB
ARTERIAL PATENCY WRIST A: YES
ATRIAL RATE: 94 BPM
BASE EXCESS BLD CALC-SCNC: 9 MMOL/L
BDY SITE: ABNORMAL
CALCULATED P AXIS, ECG09: 47 DEGREES
CALCULATED R AXIS, ECG10: 78 DEGREES
CALCULATED T AXIS, ECG11: 41 DEGREES
COMMENT, HOLDF: NORMAL
DIAGNOSIS, 93000: NORMAL
GAS FLOW.O2 O2 DELIVERY SYS: ABNORMAL L/MIN
GAS FLOW.O2 SETTING OXYMISER: 5 L/M
GLUCOSE BLD STRIP.AUTO-MCNC: 142 MG/DL (ref 65–100)
GLUCOSE BLD STRIP.AUTO-MCNC: 189 MG/DL (ref 65–100)
GLUCOSE BLD STRIP.AUTO-MCNC: 205 MG/DL (ref 65–100)
GLUCOSE BLD STRIP.AUTO-MCNC: 218 MG/DL (ref 65–100)
HCO3 BLD-SCNC: 34.5 MMOL/L (ref 22–26)
P-R INTERVAL, ECG05: 186 MS
PCO2 BLD: 58.5 MMHG (ref 35–45)
PH BLD: 7.38 [PH] (ref 7.35–7.45)
PO2 BLD: 58 MMHG (ref 80–100)
Q-T INTERVAL, ECG07: 342 MS
QRS DURATION, ECG06: 78 MS
QTC CALCULATION (BEZET), ECG08: 427 MS
SAMPLES BEING HELD,HOLD: NORMAL
SAO2 % BLD: 88 % (ref 92–97)
SERVICE CMNT-IMP: ABNORMAL
SPECIMEN TYPE: ABNORMAL
TROPONIN I SERPL-MCNC: <0.05 NG/ML
VENTRICULAR RATE, ECG03: 94 BPM

## 2019-01-03 PROCEDURE — 74011250637 HC RX REV CODE- 250/637: Performed by: HOSPITALIST

## 2019-01-03 PROCEDURE — 94640 AIRWAY INHALATION TREATMENT: CPT

## 2019-01-03 PROCEDURE — 94664 DEMO&/EVAL PT USE INHALER: CPT

## 2019-01-03 PROCEDURE — 82962 GLUCOSE BLOOD TEST: CPT

## 2019-01-03 PROCEDURE — 74011636637 HC RX REV CODE- 636/637: Performed by: FAMILY MEDICINE

## 2019-01-03 PROCEDURE — 74011250636 HC RX REV CODE- 250/636: Performed by: EMERGENCY MEDICINE

## 2019-01-03 PROCEDURE — 74011250636 HC RX REV CODE- 250/636: Performed by: FAMILY MEDICINE

## 2019-01-03 PROCEDURE — 82803 BLOOD GASES ANY COMBINATION: CPT

## 2019-01-03 PROCEDURE — 74011636637 HC RX REV CODE- 636/637: Performed by: HOSPITALIST

## 2019-01-03 PROCEDURE — 71046 X-RAY EXAM CHEST 2 VIEWS: CPT

## 2019-01-03 PROCEDURE — 36600 WITHDRAWAL OF ARTERIAL BLOOD: CPT

## 2019-01-03 PROCEDURE — 84484 ASSAY OF TROPONIN QUANT: CPT

## 2019-01-03 PROCEDURE — 65660000000 HC RM CCU STEPDOWN

## 2019-01-03 PROCEDURE — 74011000250 HC RX REV CODE- 250: Performed by: EMERGENCY MEDICINE

## 2019-01-03 PROCEDURE — 36415 COLL VENOUS BLD VENIPUNCTURE: CPT

## 2019-01-03 PROCEDURE — 74011250637 HC RX REV CODE- 250/637: Performed by: FAMILY MEDICINE

## 2019-01-03 PROCEDURE — 93005 ELECTROCARDIOGRAM TRACING: CPT

## 2019-01-03 PROCEDURE — 94660 CPAP INITIATION&MGMT: CPT

## 2019-01-03 RX ORDER — BUDESONIDE 0.5 MG/2ML
500 INHALANT ORAL
Status: DISCONTINUED | OUTPATIENT
Start: 2019-01-03 | End: 2019-01-06 | Stop reason: HOSPADM

## 2019-01-03 RX ORDER — INSULIN GLARGINE 100 [IU]/ML
35 INJECTION, SOLUTION SUBCUTANEOUS DAILY
Status: DISCONTINUED | OUTPATIENT
Start: 2019-01-03 | End: 2019-01-06 | Stop reason: HOSPADM

## 2019-01-03 RX ORDER — METOPROLOL TARTRATE 25 MG/1
25 TABLET, FILM COATED ORAL 2 TIMES DAILY
Status: DISCONTINUED | OUTPATIENT
Start: 2019-01-03 | End: 2019-01-06 | Stop reason: HOSPADM

## 2019-01-03 RX ORDER — INSULIN LISPRO 100 [IU]/ML
INJECTION, SOLUTION INTRAVENOUS; SUBCUTANEOUS
Status: DISCONTINUED | OUTPATIENT
Start: 2019-01-03 | End: 2019-01-06 | Stop reason: HOSPADM

## 2019-01-03 RX ORDER — FLUTICASONE PROPIONATE 50 MCG
2 SPRAY, SUSPENSION (ML) NASAL DAILY
Status: DISCONTINUED | OUTPATIENT
Start: 2019-01-03 | End: 2019-01-06 | Stop reason: HOSPADM

## 2019-01-03 RX ORDER — LISINOPRIL 20 MG/1
40 TABLET ORAL DAILY
Status: DISCONTINUED | OUTPATIENT
Start: 2019-01-03 | End: 2019-01-06 | Stop reason: HOSPADM

## 2019-01-03 RX ORDER — FUROSEMIDE 40 MG/1
40 TABLET ORAL 2 TIMES DAILY
Status: DISCONTINUED | OUTPATIENT
Start: 2019-01-03 | End: 2019-01-06 | Stop reason: HOSPADM

## 2019-01-03 RX ORDER — LISINOPRIL 40 MG/1
40 TABLET ORAL DAILY
COMMUNITY
End: 2019-01-08

## 2019-01-03 RX ORDER — FUROSEMIDE 10 MG/ML
80 INJECTION INTRAMUSCULAR; INTRAVENOUS
Status: COMPLETED | OUTPATIENT
Start: 2019-01-03 | End: 2019-01-03

## 2019-01-03 RX ORDER — LORATADINE 10 MG/1
10 TABLET ORAL DAILY
Status: DISCONTINUED | OUTPATIENT
Start: 2019-01-03 | End: 2019-01-06 | Stop reason: HOSPADM

## 2019-01-03 RX ORDER — SODIUM CHLORIDE 0.9 % (FLUSH) 0.9 %
5-10 SYRINGE (ML) INJECTION EVERY 8 HOURS
Status: DISCONTINUED | OUTPATIENT
Start: 2019-01-03 | End: 2019-01-06 | Stop reason: HOSPADM

## 2019-01-03 RX ORDER — ALBUTEROL SULFATE 0.83 MG/ML
2.5 SOLUTION RESPIRATORY (INHALATION)
Status: DISCONTINUED | OUTPATIENT
Start: 2019-01-03 | End: 2019-01-06 | Stop reason: HOSPADM

## 2019-01-03 RX ORDER — ARFORMOTEROL TARTRATE 15 UG/2ML
15 SOLUTION RESPIRATORY (INHALATION)
Status: DISCONTINUED | OUTPATIENT
Start: 2019-01-03 | End: 2019-01-06 | Stop reason: HOSPADM

## 2019-01-03 RX ORDER — DEXTROSE 50 % IN WATER (D50W) INTRAVENOUS SYRINGE
25-50 AS NEEDED
Status: DISCONTINUED | OUTPATIENT
Start: 2019-01-03 | End: 2019-01-06 | Stop reason: HOSPADM

## 2019-01-03 RX ORDER — ENOXAPARIN SODIUM 100 MG/ML
40 INJECTION SUBCUTANEOUS EVERY 24 HOURS
Status: DISCONTINUED | OUTPATIENT
Start: 2019-01-03 | End: 2019-01-04

## 2019-01-03 RX ORDER — AMMONIUM LACTATE 12 G/100G
LOTION TOPICAL DAILY
Status: DISCONTINUED | OUTPATIENT
Start: 2019-01-04 | End: 2019-01-03

## 2019-01-03 RX ORDER — GUAIFENESIN 100 MG/5ML
81 LIQUID (ML) ORAL DAILY
Status: DISCONTINUED | OUTPATIENT
Start: 2019-01-03 | End: 2019-01-06 | Stop reason: HOSPADM

## 2019-01-03 RX ORDER — ACETAMINOPHEN 325 MG/1
650 TABLET ORAL
Status: DISCONTINUED | OUTPATIENT
Start: 2019-01-03 | End: 2019-01-06 | Stop reason: HOSPADM

## 2019-01-03 RX ORDER — METOPROLOL TARTRATE 25 MG/1
25 TABLET, FILM COATED ORAL 2 TIMES DAILY
COMMUNITY

## 2019-01-03 RX ORDER — MEDROXYPROGESTERONE ACETATE 150 MG/ML
150 INJECTION, SUSPENSION INTRAMUSCULAR
COMMUNITY
End: 2019-01-08

## 2019-01-03 RX ORDER — POLYETHYLENE GLYCOL 3350 17 G/17G
17 POWDER, FOR SOLUTION ORAL DAILY
Status: DISCONTINUED | OUTPATIENT
Start: 2019-01-03 | End: 2019-01-06 | Stop reason: HOSPADM

## 2019-01-03 RX ORDER — SODIUM CHLORIDE 0.9 % (FLUSH) 0.9 %
5-10 SYRINGE (ML) INJECTION AS NEEDED
Status: DISCONTINUED | OUTPATIENT
Start: 2019-01-03 | End: 2019-01-06 | Stop reason: HOSPADM

## 2019-01-03 RX ORDER — MAGNESIUM SULFATE 100 %
4 CRYSTALS MISCELLANEOUS AS NEEDED
Status: DISCONTINUED | OUTPATIENT
Start: 2019-01-03 | End: 2019-01-06 | Stop reason: HOSPADM

## 2019-01-03 RX ORDER — OXYCODONE HYDROCHLORIDE 5 MG/1
5 TABLET ORAL
Status: DISCONTINUED | OUTPATIENT
Start: 2019-01-03 | End: 2019-01-06 | Stop reason: HOSPADM

## 2019-01-03 RX ORDER — METOPROLOL SUCCINATE 50 MG/1
25 TABLET, EXTENDED RELEASE ORAL 2 TIMES DAILY
Status: DISCONTINUED | OUTPATIENT
Start: 2019-01-03 | End: 2019-01-03 | Stop reason: CLARIF

## 2019-01-03 RX ORDER — INSULIN GLARGINE 100 [IU]/ML
40 INJECTION, SOLUTION SUBCUTANEOUS DAILY
COMMUNITY

## 2019-01-03 RX ORDER — LOVASTATIN 20 MG/1
40 TABLET ORAL
Status: DISCONTINUED | OUTPATIENT
Start: 2019-01-03 | End: 2019-01-06 | Stop reason: HOSPADM

## 2019-01-03 RX ORDER — GLYBURIDE 5 MG/1
5 TABLET ORAL
Status: DISCONTINUED | OUTPATIENT
Start: 2019-01-03 | End: 2019-01-06 | Stop reason: HOSPADM

## 2019-01-03 RX ADMIN — LORATADINE 10 MG: 10 TABLET ORAL at 12:35

## 2019-01-03 RX ADMIN — INSULIN LISPRO 3 UNITS: 100 INJECTION, SOLUTION INTRAVENOUS; SUBCUTANEOUS at 10:08

## 2019-01-03 RX ADMIN — ENOXAPARIN SODIUM 40 MG: 40 INJECTION SUBCUTANEOUS at 12:43

## 2019-01-03 RX ADMIN — ALBUTEROL SULFATE 1 DOSE: 2.5 SOLUTION RESPIRATORY (INHALATION) at 01:54

## 2019-01-03 RX ADMIN — ALBUTEROL SULFATE 1 DOSE: 2.5 SOLUTION RESPIRATORY (INHALATION) at 02:20

## 2019-01-03 RX ADMIN — LOVASTATIN 40 MG: 20 TABLET ORAL at 22:11

## 2019-01-03 RX ADMIN — METOPROLOL TARTRATE 25 MG: 25 TABLET ORAL at 18:02

## 2019-01-03 RX ADMIN — INSULIN LISPRO 2 UNITS: 100 INJECTION, SOLUTION INTRAVENOUS; SUBCUTANEOUS at 18:02

## 2019-01-03 RX ADMIN — INSULIN GLARGINE 35 UNITS: 100 INJECTION, SOLUTION SUBCUTANEOUS at 13:39

## 2019-01-03 RX ADMIN — INSULIN LISPRO 3 UNITS: 100 INJECTION, SOLUTION INTRAVENOUS; SUBCUTANEOUS at 12:49

## 2019-01-03 RX ADMIN — METOPROLOL TARTRATE 25 MG: 25 TABLET ORAL at 09:56

## 2019-01-03 RX ADMIN — FUROSEMIDE 80 MG: 10 INJECTION, SOLUTION INTRAMUSCULAR; INTRAVENOUS at 06:23

## 2019-01-03 RX ADMIN — ASPIRIN 81 MG CHEWABLE TABLET 81 MG: 81 TABLET CHEWABLE at 09:56

## 2019-01-03 RX ADMIN — Medication 10 ML: at 13:41

## 2019-01-03 RX ADMIN — ALBUTEROL SULFATE 1 DOSE: 2.5 SOLUTION RESPIRATORY (INHALATION) at 02:14

## 2019-01-03 RX ADMIN — Medication 10 ML: at 22:11

## 2019-01-03 RX ADMIN — GLYBURIDE 5 MG: 5 TABLET ORAL at 12:35

## 2019-01-03 NOTE — CONSULTS
Initial Pulmonary / Critical Care Consultation    Assessment / Plan:    Acute on chronic resp failure with hypoxemia and hypercarbia - suspect some degree of volume overload and also non compliance with her nocturnal trilogy with her h/o GEN / OHS as contributing to increased resp distress. ABG shows a compensated chronic resp acidosis    Reported h/o copd - former smoker    DM    Morbid obesity    --Bipap qhs and prn - chronically on O2 at 6 L/day  --diuresis  --will start pulmicort and brovana nebs with as needed albuterol  --case management to help eval her home trilogy machine - she is followed with [facility] sleep clinic    Will see again if needed    History / Subjective:  Reason:  Shortness of breath  Requesting Provider:  Dr Blue Mcgee is a 57 y.o.  female who  has a past medical history of Arthritis, Asthma, Breast lump, CAD (coronary artery disease), Congestive heart failure ([facility] COPD (chronic obstructive pulmonary disease) ([facility] Diabetes ([facility] Hypertension, Morbid obesity with BMI of 70 and over, adult [facility]), NSTEMI (non-ST elevated myocardial infarction) ([facility] and SVT (supraventricular tachycardia) ([facility] admitted 1/2/2019 with shortness of breath    Pt has h/o morbid obesity and GEN / OHS. She is on chronic O2 at 6 L/min and is supposed to use a trilogy machine at night. She says she has not used her trilogy for the last 2 weeks. She is followed at [facility] sleep center. She says her machine is not working appropriately.   She denies fevers chills or change in sputum    Her home meds list advair 500 and ventolin as needed as inhaled medications    She is not wheezing on exam   CXR is clear  She has LE edema  Echo from 1/2018 was a limited study but showed normal LV EF.  RV not well visualized    No Known Allergies  Past Medical History:   Diagnosis Date    Arthritis     Asthma     Breast lump     CAD (coronary artery disease)     Congestive heart failure ([facility]     COPD (chronic obstructive pulmonary disease) ([facility]     Diabetes ([facility]     Hypertension     Morbid obesity with BMI of 70 and over, adult ([facility]     NSTEMI (non-ST elevated myocardial infarction) ([facility]     SVT (supraventricular tachycardia) ([facility]       Past Surgical History:   Procedure Laterality Date    CARDIAC SURG PROCEDURE UNLIST      Stents    HX  SECTION      HX ORTHOPAEDIC      HX OTHER SURGICAL      cyst removed from back      Prior to Admission medications    Medication Sig Start Date End Date Taking? Authorizing Provider   fluticasone-salmeterol (ADVAIR DISKUS) 500-50 mcg/dose diskus inhaler Take 1 Puff by inhalation every twelve (12) hours. Provider, Historical   loratadine (CLARITIN) 10 mg tablet Take 10 mg by mouth daily. Provider, Historical   ketoconazole (NIZORAL) 2 % topical cream Apply  to affected area daily. 18   Astrid Almazan NP   guaiFENesin ER (MUCINEX) 600 mg ER tablet Take 1 Tab by mouth two (2) times a day. 18   Astrid Almazan NP   oxyCODONE IR (ROXICODONE) 5 mg immediate release tablet Take 1 Tab by mouth every four (4) hours as needed (pain 6-10). Max Daily Amount: 30 mg. 18   Astrid Almazan NP   polyethylene glycol (MIRALAX) 17 gram packet Take 1 Packet by mouth daily. 18   Astrid Almazan NP   albuterol (VENTOLIN HFA) 90 mcg/actuation inhaler Take 2 Puffs by inhalation every six (6) hours as needed for Wheezing. Provider, Historical   cyclobenzaprine (FLEXERIL) 5 mg tablet Take 5 mg by mouth three (3) times daily as needed. flexeriol     Provider, Historical   metoprolol succinate (TOPROL-XL) 25 mg XL tablet Take  by mouth two (2) times a day. Other, MD Landon   furosemide (LASIX) 40 mg tablet Take 40 mg by mouth two (2) times a day. Indications: Peripheral Edema due to Chronic Heart Failure    Other, MD Landon   aspirin 81 mg chewable tablet Take 81 mg by mouth daily.     Provider, Historical   insulin glargine (LANTUS) 100 unit/mL injection 35 Units by SubCUTAneous route daily. Provider, Historical   hydrOXYzine pamoate (VISTARIL) 50 mg capsule Take 50 mg by mouth every eight (8) hours as needed for Itching. Indications: Pruritus of Skin    Provider, Historical   fluticasone (FLONASE) 50 mcg/actuation nasal spray 2 Sprays by Both Nostrils route daily. 17   Adonay Gallardo MD   lovastatin (MEVACOR) 40 mg tablet Take 1 Tab by mouth nightly. 16   Angela SIMPSON NP   glyBURIDE (DIABETA) 5 mg tablet Take 5 mg by mouth Daily (before breakfast). Provider, Historical   ammonium lactate (LAC-HYDRIN) 12 % topical cream rub in to affected area well 14   Adilia Song MD   nitroglycerin (NITROSTAT) 0.4 mg SL tablet 1 Tab by SubLINGual route every five (5) minutes as needed for Chest Pain (call 911 if not relieved by 3). 13   Beckie Broussard NP      Family History   Problem Relation Age of Onset    Heart Disease Mother     Heart Disease Father     Heart Disease Sister     Breast Cancer Maternal Grandmother     Breast Cancer Paternal Grandmother      Social History     Tobacco Use    Smoking status: Former Smoker     Packs/day: 0.25     Types: Cigarettes    Smokeless tobacco: Never Used    Tobacco comment: Patient states \"I  aint smoking no more d*mn cigarettes after yesterday\" 2018   Substance Use Topics    Alcohol use: No      ROS:  A comprehensive review of systems was negative except for that written in the HPI.     Objective:  Patient Vitals for the past 4 hrs:   BP Temp Pulse Resp SpO2 Weight   19 0648     99 %    19 0605 113/80 97.7 °F (36.5 °C) (!) 115 25 98 % (!) 195.5 kg (431 lb)   19 0542   (!) 131 15 90 %    19 0541 144/72          Temp (24hrs), Av.1 °F (36.7 °C), Min:97.7 °F (36.5 °C), Max:98.4 °F (36.9 °C)    CVP:        No intake or output data in the 24 hours ending 19 0828  Blood Sugar:  Glucose   Date Value Ref Range Status   10/23/2018 464 (T) 09 - 164 mg/dL Final   10/22/2018 243 (H) 65 - 100 mg/dL Final   10/21/2018 310 (H) 65 - 100 mg/dL Final   10/20/2018 166 (H) 65 - 100 mg/dL Final   09/26/2018 278 (H) 65 - 100 mg/dL Final     Exam:  Obese  Alert  No distress on bipap  No accessory use  Symmetrical chest expansion  Diminished bs bilaterally  RRR  Protuberant soft  Warm and dry  2+ edema    Lab data was reviewed. Radiology images were independently viewed and available reports were reviewed.     CXR - no acute process    Lab:  Recent Labs     01/03/19  0031   TROIQ <0.05     ABG:  Recent Labs     01/03/19  0306   PHI 7.378   PCO2I 58.5*   PO2I 58*   HCO3I 34.5*   SO2I 88*         Talita Brambila MD

## 2019-01-03 NOTE — ED NOTES
Pt arrived from [facility] without IV access. This RN attempted x 1 with US guidance, was able to obtain labs, but IV was unsuccessful. Dr. Rj Mojica to bedside to attempt. Pt is very verbally abusive to nurse, using raised voice, cursing at times, states she wants to be put on a more comfortable bed. This RN has already ordered a Bariatric hospital bed from [address] [place] [address], but it has yet to be delivered. Pt's weight eliminates her from using any other hospital beds that are currently in stock, only leaving the ED stretcher that is provided. She has removed her BP cuff and will not allow this RN to replace it at this time.

## 2019-01-03 NOTE — ED NOTES
Pt refusing primary nurse to administer Lasix. Informed pt that this medication would help her breathe easier. Pt continues to refuse care.

## 2019-01-03 NOTE — ED NOTES
Informed by the primary RN Kayley Stewart that patient took her own Roxicodone 5mg from her home medications in the room. Dr. Shakir Sharp has been notified that she would like to talk to him to about pain medications. Patient's pain medication was pulled out to the nurses station.

## 2019-01-03 NOTE — ED NOTES
12:40 AM 
IV ACCESS WITH ULTRASOUND GUIDANCE Adalberto Tim MD gained IV access using  *18** gauge needle. Indication is vascular access could not be obtained. The site was cleansed with an alcohol prep, the R forearm vein was localized with ultrasound guidance in an anterior approach. Line confirmation was obtained by direct visualization and good blood return. No anaesthetic was used. The line was successfully flushed with normal saline and was secured with transparent tape. The patient tolerated the procedure well. There were no complications.

## 2019-01-03 NOTE — ED NOTES
Pt attitude generally noncompliant. Pt refusing several medications and services. Several attempts made by ED staff, charge nurse, and RT to administer medications, draw labs, and provide care.

## 2019-01-03 NOTE — ED NOTES
Pt moved to Bariatric Chair (600lb weight limit). Pt is still refusing BiPAP, wants to finish eating, then she will entertain the idea of putting it back on. Dr. Jareth Hendrickson asked to come down and evaluate patient. She also went into her belongings and took her own Roxycodone 5mg tablet. Pt's medications removed from her room.

## 2019-01-03 NOTE — ED NOTES
Pt allowed primary nurse to reconnect pulse ox and cardiac monitor, refuses blood pressure cuff. Call bell within reach. O2 94% on 6L NC,  sinus tach, RR 27. Will continue to monitor and assess.

## 2019-01-03 NOTE — ED NOTES
Pt refusing to let RT obtain ABG. RT and primary nurse have explained that the ABG could differentiate level and plan of care. Pt continues to refuse.

## 2019-01-03 NOTE — PROGRESS NOTES
Noted CM consult written upon admission. Pt has inpt orders for telemetry for acute chest pain, SOB. Pt has consult reporting that her home Trilogy is not working. CM working to determine name of provider. Noted that CM at [facility] provided pt with two different nebulizers this year. 1.  Provided a nebulizer from [facility] to pt in July 2018 2. [facility]"" provided pt with hospital bed in May 2018, ordered by PCP, pt had concerns in the past about mattress. 3.  Open to [address] for home oxygen, 5-6 LNC oxygen at baseline and home concentrator goes up to 10L per minute. 4.  Open to [facility] for personal care services, [phone], for medicaid personal care. Called agency and advised of admission. Normal home personal care hours for pt are 9am-3pm 7 days per week. 5.  Pt is open to [facility] currently for skilled nursing for wound care. 6.  Pt has home Trilogy, CM still attempting to verify which agency, it is not [facility] or [address] [place] [id number] aide at [facility] was contacted and she advised that none of the equipment was broken. CM did call [facility] [phone] to inquire if they provided Trilogy. Had to leave a message for intake staff. Pulmonology is following pt here. Unit CM will follow and complete assessment.    
 
ROSCOE Glass

## 2019-01-03 NOTE — ED NOTES
Pt refusing primary nurse to pull labs from IV. Informed pt that labs will give the physician at better picture of what is going on with her. Pt continues to refuse.

## 2019-01-03 NOTE — WOUND CARE
Wound Care Note:  
 
New consult placed by physician request for leg wounds Chart shows: 
Admitted for shortness of breath and acute chest pain Past Medical History:  
Diagnosis Date  Arthritis  Asthma  Breast lump  CAD (coronary artery disease)  Congestive heart failure ([facility]  COPD (chronic obstructive pulmonary disease) ([facility])  Diabetes ([facility]  Hypertension  Morbid obesity with BMI of 70 and over, adult ([facility]  NSTEMI (non-ST elevated myocardial infarction) ([facility]  SVT (supraventricular tachycardia) ([facility]) No WBC labs Admitted from [facility] ED Assessment:  
Patient is A&O x 4, communicative, and continent; did not assess movement as patient up in chair and unwilling to stand at this time. Bed: Sitting in bariatric chair; [facility] bed to be delivered (refuses bariatric bed) Diet: NPO Patient pain; no number given. Bilateral heels skin intact and without erythema. Buttocks and sacrum were not assessed; patient stated she is having leg cramps and would not stand. 1. POA left lower posterior leg venous stasis wound measures 12 cm x 9 cm x 0.1 cm, wound bed is pink, large amount of serous drainage, jae-wound is macerated, wound edges are open. Left lower leg with papillomatosis consistent with lymphedema; Lac-Hydrin will be ordered. Adaptic, ABD pad and roll gauze applied. 2.  POA right mid foot (plantar and medial aspects) with unstageable pressure injury measuring 4 cm x 7 cm, wound bed is dark eschar, no drainage, jae-wound intact. Left open to air. Right lower leg with dry, flaky skin; Lac-Hydrin will be ordered. Spoke with Dr. Bryan Kearns, wound care orders obtained. Patient sitting up in chair.  
  
Recommendations:   
Left lower leg- Daily cleanse lower leg with soap and water, apply Lac-Hydrin lotion to intact skin on lower leg, cleanse wound with normal saline, wipe wound bed clean and dry, apply a piece of Xeroform gauze that has been folded in half, cover with ABD pad and secure with roll gauze and tape. Right lower leg- Daily cleanse lower leg with soap and water and apply Lac-Hydrin lotion Skin Care & Pressure Prevention: 
Minimize layers of linen/pads under patient to optimize support surface. Turn/reposition approximately every 2 hours and offload heels. Manage incontinence / promote continence Discussed above plan with patient & Cisco Ovalle RN Transition of Care: Plan to follow as needed while admitted to hospital. 
 
MYA Apple, RN, [facility], [facility]. 
office [phone] 
pager [phone] or call  to page

## 2019-01-03 NOTE — ED NOTES
12:36 AM 
IV ACCESS WITH ULTRASOUND GUIDANCE Farooq Bruce MD gained IV access using  *15** gauge needle. Indication is vascular access could not be obtained. The site was cleansed with an alcohol prep, the L forearm vein was localized with ultrasound guidance in an anterior approach. Line confirmation was obtained by direct visualization and good blood return. No anaesthetic was used. The line was successfully flushed with normal saline and was secured with transparent tape. The patient tolerated the procedure well. There were no complications.

## 2019-01-03 NOTE — ED PROVIDER NOTES
57 y.o. female with past medical history significant for CHF, HTN, SVT, NSTEMI, CAD, COPD, DM who presents to the ED from [facility] via EMS with chief complaints of CP and SOB. Pt states that she has been experiencing moderate L sided CP and \"fluid build-up\" in her legs for about a week now. She notes that while she normally takes 40mg Lasix BID, due to the amount of difficulty involved in retrieving her medication, pt has only been taking 1 Lasix/day. Pt also states she has not been using her CPAP machine for the last week, as it is broken. She reports rhinorrhea but denies hemoptysis and N/V. There are no other acute medical concerns at this time. Negative Tobacco use; Negative EtOH use; Negative Illicit Drug Abuse PCP: Hollis Mistry NP 
 
CHART REVIEW: In 2018 pt was admitted to [facility] for respiratory failure and CHF exacerbation. From [facility] workup today, CXR R hilar enlargement with possible lymphadenopathy. WBC = 8, HGB =12, Plt Count =159, CMP nml except CO2 =35, Ca =10.6, Trop =0, BNP =68, Cr =0.65. Note written by Vannesa Keyes, as dictated by [facility], DO 12:36 AM  
 
 
The history is provided by the patient and medical records. No  was used. Past Medical History:  
Diagnosis Date  Arthritis  Asthma  Breast lump  CAD (coronary artery disease)  Congestive heart failure ([address]  COPD (chronic obstructive pulmonary disease) ([facility])  Diabetes ([address]  Hypertension  Morbid obesity with BMI of 70 and over, adult ([address]  NSTEMI (non-ST elevated myocardial infarction) ([address]  SVT (supraventricular tachycardia) ([facility]) Past Surgical History:  
Procedure Laterality Date  CARDIAC SURG PROCEDURE UNLIST Stents  HX  SECTION    
 HX ORTHOPAEDIC    
 HX OTHER SURGICAL    
 cyst removed from back Family History:  
Problem Relation Age of Onset  Heart Disease Mother  Heart Disease Father  Heart Disease Sister  Breast Cancer Maternal Grandmother  Breast Cancer Paternal Grandmother Social History Socioeconomic History  Marital status: SINGLE Spouse name: Not on file  Number of children: Not on file  Years of education: Not on file  Highest education level: Not on file Social Needs  Financial resource strain: Not on file  Food insecurity - worry: Not on file  Food insecurity - inability: Not on file  Transportation needs - medical: Not on file  Transportation needs - non-medical: Not on file Occupational History  Not on file Tobacco Use  Smoking status: Former Smoker Packs/day: 0.25 Types: Cigarettes  Smokeless tobacco: Never Used  Tobacco comment: Patient states \"I  aint smoking no more d*mn cigarettes after yesterday\" 01/26/2018 Substance and Sexual Activity  Alcohol use: No  
 Drug use: No  
 Sexual activity: Not on file Other Topics Concern  Not on file Social History Narrative ** Merged History Encounter ** ALLERGIES: Patient has no known allergies. Review of Systems HENT: Positive for rhinorrhea. Respiratory: Positive for shortness of breath. Cardiovascular: Positive for chest pain and leg swelling. Gastrointestinal: Negative for nausea and vomiting. Genitourinary: Positive for flank pain. Vitals:  
 01/03/19 0004 01/03/19 0214 BP: (!) 164/109 Pulse: 95 100 Resp: 21 20 Temp: 98.4 °F (36.9 °C) SpO2: 95% 96% Height: 5' 6\" (1.676 m) Physical Exam  
 
  
Constitutional: Pt is awake and alert. Pt appears well-developed and well-nourished. NAD. HENT:  
Head: Normocephalic and atraumatic. Nose: Nose normal.  
Mouth/Throat: Oropharynx is clear and moist. No oropharyngeal exudate. Eyes: Conjunctivae and extraocular motions are normal. Pupils are equal, round, and reactive to light. Right eye exhibits no discharge.  Left eye exhibits no discharge. No scleral icterus. Neck: No tracheal deviation present. Supple neck. Cardiovascular: Normal rate, regular rhythm, normal heart sounds and intact distal pulses. Exam reveals no gallop and no friction rub. No murmur heard. Pulmonary/Chest: Scattered expiratory wheezes, mild respiratory distress. Pt  has no rales. Abdominal: Soft. Obese. Pt  exhibits no distension and no mass. No tenderness. Pt  has no rebound and no guarding. Musculoskeletal:  Pt  exhibits no tenderness. BLE are edematous (L >R) Neurological:  Pt is alert. nonfocal neuro exam. 
Skin: Skin is warm and dry. Pt  is not diaphoretic. Psychiatric:  Pt  has a normal mood and affect. Behavior is normal.  
 
Note written by Vannesa Rosenthal, as dictated by Alex Blank DO 12:36 AM  
 
MDM Procedures ED EKG interpretation: 
Rhythm: normal sinus rhythm; and regular . Rate (approx.): 94 bpm; Axis: normal; ST/T wave: normal. 
 
Note written by Vannesa Rosenthal, as dictated by Alex Blank DO 1:25 AM  
 
  
Labs reviewed from OSH 
 
 
D/w Dr Valeria Brush via TT. Will admit.

## 2019-01-03 NOTE — ED NOTES
Compella bed in pt's room. Pt sitting on bedside commode while setting up room. Compella bed alarming, called [facility] for assistance. Someone to fix the bed is en route to assist. Pt refusing to sit on bed due to the height. Requesting a hospital bed that is in the hallway. Informed pt that the hospital bed is not an option due to weight limit. Pt cursing at ED staff, stating she cannot stay on the commode. Primary and charge nurses offered to pull ER stretcher back into room until the bed can be fixed. Pt continues to raise her voice at staff. Pt remains on commode, refuses to let primary nurse reconnect monitor cords or care for pt in any other kind of way at this time. Call bell within reach.

## 2019-01-03 NOTE — PROGRESS NOTES
Hospitalist Progress Note Dave Guillen MD 
Office: [phone] Date of Service:  1/3/2019 NAME:  Barbara Hargrove :  1970 MRN:  [id number] Pt seen and examined discussed care plan Pt on BIPAP seen in ER Pt non complaint and did not use lasix either , she has lymphedema Hospital Problems  Date Reviewed: 2018 Codes Class Noted POA Acute chest pain ICD-10-CM: R07.9 ICD-9-CM: 786.50  1/3/2019 Unknown  
   
 SOB (shortness of breath) ICD-10-CM: R06.02 
ICD-9-CM: 786.05  2017 Unknown Review of Systems: A comprehensive review of systems was negative. Vital Signs:  
 Last 24hrs VS reviewed since prior progress note. Most recent are: 
Visit Vitals /80 (BP 1 Location: Left arm, BP Patient Position: At rest) Pulse (!) 115 Temp 97.7 °F (36.5 °C) Resp 25 Ht 5' 6\" (1.676 m) Wt (!) 195.5 kg (431 lb) SpO2 99% BMI 69.57 kg/m² Physical Examination:  
 
 
     
   
   
Resp:  CTA bilaterally. Few  wheezing/rhonchi/rales. CV:  Regular rhythm, normal rate, no murmurs, gallops, rubs GI:  Soft, non distended, non tender. normoactive bowel sounds, no hepatosplenomegaly Musculoskeletal:  4+ edema Neurologic:  Moves all extremities. AAOx3, CN II-XII reviewed PLAN:  
 
1. Cont BIPAP , need trilogy pulm to see, chronically on O2 at 6 L/day 2. -Cont diuresis cont nebs,  pulmicort and brovana nebs with as needed albuterol 3. Morbid obesity 
______________________________________________________________________ EXPECTED LENGTH OF STAY: - - - 
ACTUAL LENGTH OF STAY:          0 Dave Guillen MD

## 2019-01-03 NOTE — ED TRIAGE NOTES
Pt arrives via EMS as a transfer from [facility] ED with c/o of SOB at rest for the past 4 days. Pt reports that she usually cpap and has not used it for the past 2 weeks. Pt also reports that she has not taken her Lasix today. Pt uses NC @ 6L at baseline.

## 2019-01-03 NOTE — ED NOTES
Pt provided with meal tray. BiPAP on standby. Pt has called [facility] herself asking to be transferred and was told by staff that they have no beds. Patient Advocacy has been to bedside and spoke with patient. Pt is requesting pain medication for hip/buttock/leg pain. Will request orders. Attempted to get information on the Bariatric Recliner, but staff on unit is unable to tell RN the weight limit on the equipment. Pt made aware.

## 2019-01-03 NOTE — ED NOTES
Pt is now screaming out in pain, demanding to get off the stretcher. Advised pt that our options are the ER stretcher or the bed she is currently on. She is demanding the ER stretcher back. Pt's saturations are 87% on her 6lpm of oxygen via NC, she is refusing to put back on her BiPAP. HR is up to 130's. Primary RN made aware. Nursing Supervisor also made aware of patient and her desire to be transferred to [facility] as soon as they have a bed available. Orders received from Dr. Gallito Richey for non-narcotic pain medications. Ace Rosana attempting to find the weight limit on a bariatric chair. Pt is aware of this

## 2019-01-03 NOTE — ED NOTES
Pt assisted with using bedside commode and then assisted into Bariatric hospital bed. Pt was able to stand, climb on step stool, transfer into bed with minimal assistance. Weeping noted to LEFT calf. Chux pad placed. Pt allowing BP's every 4 hours, as long as cuff is not left on. Pt is also agreeing to Lasix once a Urinary Simuel Burows was applied. Pt also now agreeing to BiPAP. Respiratory paged and made aware. Pt is aware she is NPO, but requests a breakfast tray be ordered.

## 2019-01-03 NOTE — ED NOTES
Made Dr. Bethanie Hernandez aware of patient being intermittently non-compliant throughout the night and her request for a meal tray and her BiPAP usage. States that pt may eat a meal tray if she is not wearing her BiPAP. Ordered. Pt's VSS

## 2019-01-03 NOTE — DIABETES MGMT
Noted consult for assessment of home management. DTC will see patient tomorrow, once she is in a room (still in ED). Noted home medications ordered.   Rosaline William MS, RN, CDE

## 2019-01-03 NOTE — PROGRESS NOTES
6790 - Bedside and Verbal shift change report given to abad alvarado (oncoming nurse) by Zeny Traore (offgoing nurse). Report included the following information SBAR, Kardex, ED Summary, Intake/Output, MAR, Recent Results and Cardiac Rhythm ST.  
0900 - patient refusing ordered bipap and screaming out for pain and to be taken off of hospital bed. Current hospital bed is only bed suitable for patient's weight. Patient also refusing hourly blood pressures and serial labs. Christian Clark MD ordered tylenol for pain. Patient refusing tylenol. patient pulled out home narcotic and took a pill. (roxicodone). Patient instructed against taking any home medications prior. RN has informed charge nurse and called and informed nursing supervisor Luis Antonio Hunt and informed MD regarding situation. 1100 - patient placed on recliner. Bed taken out of room. Wound care here to see patient. Order for new bed that patient agrees with placed. 1110 - patient now calm. More relaxed. Nasal cannula 6L, no distress. 1800 - bed not on unit. Informed charge RN. New bed reordered and will come tonight. 1930 - Bedside and Verbal shift change report given to pilar alvarado (oncoming nurse) by Sia Arana (offgoing nurse). Report included the following information SBAR, Kardex, ED Summary, Intake/Output, MAR, Recent Results and Cardiac Rhythm NSR.

## 2019-01-03 NOTE — H&P
[address]  HISTORY AND PHYSICAL Maikel Garvin 
MR#: [id number] : 1970 ACCOUNT #: [de-identified] ADMIT DATE: 2019 CHIEF COMPLAINT:  Shortness of breath and chest pain. HISTORY OF PRESENT ILLNESS:  Patient at times is a limited historian. The remainder of history was obtained from review of ED and electronic medical records. The patient initially came to the ED as a transfer from [facility] Emergency Department for evaluation. Per the transfer reports, the patient complained of shortness of breath symptoms over the past few days. She also complained of having some chest pain located in the left anterior chest that was vaguely described as sharp, 6/10, without specific exacerbating or alleviating factors. Shortness of breath notably had worsened which was thought to be associated with CHF. She has a history of chronic hypoxic hypercapnic respiratory failure and is on 6 liters of oxygen at baseline during the day and on Trilogy with 6 liters of oxygen bleed in at nighttime. The patient had last been hospitalized from 10/20/2018 to 10/26/2018 with acute on chronic hypoxemic hypercarbic respiratory failure, acute on chronic diastolic congestive heart failure, anasarca, worsening edema, obstructive sleep apnea with obesity hypoventilation syndrome, chronic left lower extremity lymphedema and venous stasis. Patient was on BiPAP during the hospitalization. The patient had a workup including a chest x-ray reportedly which shows right hilar enlargement, possible lymphadenopathy. The patient subsequently was transferred to [facility].  On arrival, repeat chest x-ray, PA and lateral, showed no acute process. Arterial blood gas showed a pH of 7.378, pCO2 of 58.5, pO2 of 58, bicarbonate 34.5, base excess of 9, SaO2 of 80% on 5 liters oxygen nasal cannula.   Patient is now seen for admission to the hospitalist service for continued evaluation and treatment. She does not report residual chest pain symptoms. Shortness of breath notably is persistent such that she has to sit upright on the edge of her bed. She notes that at baseline; however, she is not able to lie flat. She is not complaining of any current dizziness, lightheadedness, focal weakness, new onset numbness, paresthesias, slurred speech, facial droop, headache, neck pain, back pain, abdominal pain, nausea, vomiting, diarrhea, melena, dysuria, hematuria, fever, chills or rash. She has chronic lymphedema and has wounds on her legs where she has dressings applied. PAST MEDICAL HISTORY: 
1. Arthritis. 2.  Asthma. 3.  Chronic hypoxic hypercapnic respiratory failure on 6 liters of oxygen and Trilogy at nighttime. 4.  Coronary artery disease. 5.  Congestive heart failure per chart records, unspecified. Last 2D echocardiogram on 2018 showing left ventricular ejection fraction of 55-60%. 6.  Morbid obesity. 7.  NSTEMI. 8.  Supraventricular tachycardia. 9.  Hypertension. 10.  Type 2 diabetes mellitus. 11.  COPD. PAST SURGICAL HISTORY:  PTCA stent,  section, cyst removal from back. MEDICATIONS:  Complete medication list reviewed and noted on the chart records. ALLERGIES:  NO KNOWN DRUG ALLERGIES. SOCIAL HISTORY:  Former smoker of cigarettes, 1/4 of a pack a day. No reports of alcohol or illicit drugs. FAMILY HISTORY:  Heart disease in mother, father, sister. Breast cancer in maternal grandmother, paternal grandmother. REVIEW OF SYSTEMS:  Pertinent positives as noted in HPI. All other systems reviewed and negative. PHYSICAL EXAMINATION: 
VITAL SIGNS:  Temperature 98.4 degrees Fahrenheit, blood pressure 144/72, heart rate of 134, respiratory rate of 24. O2 saturations 96% on 6 liters oxygen via nasal cannula. Recorded weight of 431 pounds (195.5 kg). Recorded height is 5 feet 6 inches tall, BMI of 69.6. GENERAL:  Morbidly obese female in acute respiratory distress. PSYCHIATRIC:  The patient is awake, alert, and oriented x3, but very anxious. NEUROLOGIC:  GCS of 15. Moves extremities x4. Sensation grossly intact without slurred speech or facial droop. HEENT:  Normocephalic, atraumatic. PERRLA. EOMs intact. Sclerae is anicteric. Conjunctivae clear. Nares are patent. Oropharynx clear. Tongue is midline, nonedematous. NECK:  Supple, without lymphadenopathy, JVD, carotid bruit. No thyromegaly. LYMPH:  Negative for cervical or supraclavicular adenopathy. RESPIRATORY:  Scattered wheezes. CARDIOVASCULAR:  Tachycardic, regular rhythm. No murmurs, rubs or gallops. GASTROINTESTINAL:  Abdomen is soft, obese, nontender, nondistended. Normoactive bowel sounds. No rebound or rigidity. No auscultated abdominal bruit. No pulsatile mass. BACK:  No CVA tenderness. No step-off deformity. MUSCULOSKELETAL:  Limited range of motion of bilateral lower extremities with massive lymphedema to bilateral lower extremities. VASCULAR:  2+ radial, 1+ dorsalis pedis pulses without cyanosis. There is marked nonpitting edema of the lower extremities. SKIN:  Warm and dry with hardening and sclerosis of both legs with some superficial wounds of both legs. LABORATORY DATA:  Reviewed were reviewed from the transfer hospital.  ABG was also reviewed and noted in HPI. [facility] showed no acute process and noted cardiomegaly. A 12-lead EKG shows normal sinus rhythm, sinus arrhythmia at 94 beats a minute. IMPRESSION AND PLAN: 
1. Acute on chronic hypoxic hypercapnic respiratory failure. Place on BiPAP. Bleed 6 liters of oxygen through BiPAP, continue pulse oximetry monitoring. Consult with pulmonologist. 
2.  Acute chest pain. Repeat troponin level. Continue telemetry monitoring.   Consider for nuclear medicine stress test, however, obtaining this test may be technically difficult exam due to inability to lie supine. 3.  Chronic obstructive pulmonary disease. Nebulize with DuoNeb nebulizer treatments. 4.  Asthma. Plan as noted above. 5.  Type 2 diabetes mellitus. Place on Humalog insulin correction coverage, schedule Accu-Cheks. Check hemoglobin A1c level. The patient will be on a diabetic heart healthy diet. 6.  Hypertension. Monitor blood pressure closely and provide antihypertensive meds. 7.  Tachycardia. Continue telemetry monitoring. 8.  Anxiety. Provide supportive cares. Explained to the patient the plan of care. 9.  Venous thromboembolism prophylaxis. Order Lovenox 40 mg subcutaneous q.24 hours. 10.  Lymphedema with leg wounds. Consult with wound care team. 
 
 
 
MD PIERO Garnett/CHERYL 
D: 01/03/2019 09:33    
T: 01/03/2019 10:11 
JOB #: [id number]

## 2019-01-04 LAB
ANION GAP SERPL CALC-SCNC: 3 MMOL/L (ref 5–15)
BASOPHILS # BLD: 0 K/UL (ref 0–0.1)
BASOPHILS NFR BLD: 0 % (ref 0–1)
BUN SERPL-MCNC: 16 MG/DL (ref 6–20)
BUN/CREAT SERPL: 21 (ref 12–20)
CALCIUM SERPL-MCNC: 10 MG/DL (ref 8.5–10.1)
CHLORIDE SERPL-SCNC: 102 MMOL/L (ref 97–108)
CO2 SERPL-SCNC: 35 MMOL/L (ref 21–32)
CREAT SERPL-MCNC: 0.76 MG/DL (ref 0.55–1.02)
DIFFERENTIAL METHOD BLD: ABNORMAL
EOSINOPHIL # BLD: 0.2 K/UL (ref 0–0.4)
EOSINOPHIL NFR BLD: 3 % (ref 0–7)
ERYTHROCYTE [DISTWIDTH] IN BLOOD BY AUTOMATED COUNT: 13.2 % (ref 11.5–14.5)
GLUCOSE BLD STRIP.AUTO-MCNC: 133 MG/DL (ref 65–100)
GLUCOSE BLD STRIP.AUTO-MCNC: 136 MG/DL (ref 65–100)
GLUCOSE BLD STRIP.AUTO-MCNC: 148 MG/DL (ref 65–100)
GLUCOSE BLD STRIP.AUTO-MCNC: 159 MG/DL (ref 65–100)
GLUCOSE SERPL-MCNC: 132 MG/DL (ref 65–100)
HCT VFR BLD AUTO: 38.3 % (ref 35–47)
HGB BLD-MCNC: 11.1 G/DL (ref 11.5–16)
IMM GRANULOCYTES # BLD: 0 K/UL (ref 0–0.04)
IMM GRANULOCYTES NFR BLD AUTO: 0 % (ref 0–0.5)
LYMPHOCYTES # BLD: 1.2 K/UL (ref 0.8–3.5)
LYMPHOCYTES NFR BLD: 15 % (ref 12–49)
MCH RBC QN AUTO: 28.9 PG (ref 26–34)
MCHC RBC AUTO-ENTMCNC: 29 G/DL (ref 30–36.5)
MCV RBC AUTO: 99.7 FL (ref 80–99)
MONOCYTES # BLD: 0.9 K/UL (ref 0–1)
MONOCYTES NFR BLD: 11 % (ref 5–13)
NEUTS SEG # BLD: 5.5 K/UL (ref 1.8–8)
NEUTS SEG NFR BLD: 71 % (ref 32–75)
NRBC # BLD: 0 K/UL (ref 0–0.01)
NRBC BLD-RTO: 0 PER 100 WBC
PLATELET # BLD AUTO: 51 K/UL (ref 150–400)
POTASSIUM SERPL-SCNC: 3.8 MMOL/L (ref 3.5–5.1)
RBC # BLD AUTO: 3.84 M/UL (ref 3.8–5.2)
RBC MORPH BLD: ABNORMAL
SERVICE CMNT-IMP: ABNORMAL
SODIUM SERPL-SCNC: 140 MMOL/L (ref 136–145)
WBC # BLD AUTO: 7.8 K/UL (ref 3.6–11)

## 2019-01-04 PROCEDURE — 82962 GLUCOSE BLOOD TEST: CPT

## 2019-01-04 PROCEDURE — 74011250637 HC RX REV CODE- 250/637: Performed by: HOSPITALIST

## 2019-01-04 PROCEDURE — 80048 BASIC METABOLIC PNL TOTAL CA: CPT

## 2019-01-04 PROCEDURE — 94762 N-INVAS EAR/PLS OXIMTRY CONT: CPT

## 2019-01-04 PROCEDURE — 74011000250 HC RX REV CODE- 250: Performed by: FAMILY MEDICINE

## 2019-01-04 PROCEDURE — 85025 COMPLETE CBC W/AUTO DIFF WBC: CPT

## 2019-01-04 PROCEDURE — 74011000250 HC RX REV CODE- 250: Performed by: INTERNAL MEDICINE

## 2019-01-04 PROCEDURE — 36415 COLL VENOUS BLD VENIPUNCTURE: CPT

## 2019-01-04 PROCEDURE — 65660000000 HC RM CCU STEPDOWN

## 2019-01-04 PROCEDURE — 74011250637 HC RX REV CODE- 250/637: Performed by: FAMILY MEDICINE

## 2019-01-04 PROCEDURE — 94640 AIRWAY INHALATION TREATMENT: CPT

## 2019-01-04 PROCEDURE — 74011636637 HC RX REV CODE- 636/637: Performed by: FAMILY MEDICINE

## 2019-01-04 PROCEDURE — 94660 CPAP INITIATION&MGMT: CPT

## 2019-01-04 PROCEDURE — 74011636637 HC RX REV CODE- 636/637: Performed by: HOSPITALIST

## 2019-01-04 PROCEDURE — 77010033678 HC OXYGEN DAILY

## 2019-01-04 RX ORDER — AMMONIUM LACTATE 12 G/100G
LOTION TOPICAL DAILY
Status: DISCONTINUED | OUTPATIENT
Start: 2019-01-04 | End: 2019-01-06 | Stop reason: HOSPADM

## 2019-01-04 RX ORDER — AMMONIUM LACTATE 12 G/100G
LOTION TOPICAL DAILY
Status: DISCONTINUED | OUTPATIENT
Start: 2019-01-05 | End: 2019-01-04

## 2019-01-04 RX ORDER — CYCLOBENZAPRINE HCL 10 MG
5 TABLET ORAL
Status: DISCONTINUED | OUTPATIENT
Start: 2019-01-04 | End: 2019-01-06 | Stop reason: HOSPADM

## 2019-01-04 RX ORDER — NYSTATIN 100000 [USP'U]/G
POWDER TOPICAL 2 TIMES DAILY
Status: DISCONTINUED | OUTPATIENT
Start: 2019-01-04 | End: 2019-01-06 | Stop reason: HOSPADM

## 2019-01-04 RX ADMIN — INSULIN GLARGINE 35 UNITS: 100 INJECTION, SOLUTION SUBCUTANEOUS at 09:30

## 2019-01-04 RX ADMIN — ARFORMOTEROL TARTRATE 15 MCG: 15 SOLUTION RESPIRATORY (INHALATION) at 08:08

## 2019-01-04 RX ADMIN — NYSTATIN: 100000 POWDER TOPICAL at 17:08

## 2019-01-04 RX ADMIN — METOPROLOL TARTRATE 25 MG: 25 TABLET ORAL at 17:08

## 2019-01-04 RX ADMIN — Medication 10 ML: at 15:53

## 2019-01-04 RX ADMIN — GLYBURIDE 5 MG: 5 TABLET ORAL at 07:26

## 2019-01-04 RX ADMIN — INSULIN LISPRO 2 UNITS: 100 INJECTION, SOLUTION INTRAVENOUS; SUBCUTANEOUS at 17:08

## 2019-01-04 RX ADMIN — FUROSEMIDE 40 MG: 40 TABLET ORAL at 09:28

## 2019-01-04 RX ADMIN — LORATADINE 10 MG: 10 TABLET ORAL at 09:29

## 2019-01-04 RX ADMIN — NYSTATIN: 100000 POWDER TOPICAL at 12:40

## 2019-01-04 RX ADMIN — Medication 10 ML: at 05:00

## 2019-01-04 RX ADMIN — Medication 10 ML: at 22:46

## 2019-01-04 RX ADMIN — BUDESONIDE 500 MCG: 0.5 INHALANT RESPIRATORY (INHALATION) at 08:08

## 2019-01-04 RX ADMIN — METOPROLOL TARTRATE 25 MG: 25 TABLET ORAL at 09:29

## 2019-01-04 RX ADMIN — CYCLOBENZAPRINE HYDROCHLORIDE 5 MG: 10 TABLET, FILM COATED ORAL at 16:32

## 2019-01-04 RX ADMIN — ARFORMOTEROL TARTRATE 15 MCG: 15 SOLUTION RESPIRATORY (INHALATION) at 19:55

## 2019-01-04 RX ADMIN — FLUTICASONE PROPIONATE 2 SPRAY: 50 SPRAY, METERED NASAL at 09:32

## 2019-01-04 RX ADMIN — LISINOPRIL 40 MG: 20 TABLET ORAL at 09:29

## 2019-01-04 RX ADMIN — ASPIRIN 81 MG CHEWABLE TABLET 81 MG: 81 TABLET CHEWABLE at 09:30

## 2019-01-04 RX ADMIN — FUROSEMIDE 40 MG: 40 TABLET ORAL at 17:08

## 2019-01-04 RX ADMIN — INSULIN LISPRO 2 UNITS: 100 INJECTION, SOLUTION INTRAVENOUS; SUBCUTANEOUS at 12:39

## 2019-01-04 RX ADMIN — BUDESONIDE 500 MCG: 0.5 INHALANT RESPIRATORY (INHALATION) at 19:55

## 2019-01-04 RX ADMIN — LOVASTATIN 40 MG: 20 TABLET ORAL at 22:45

## 2019-01-04 NOTE — PROGRESS NOTES
Hospitalist Progress Note Karly Morales MD 
Answering service: [phone] [place] [phone] from in house phone Date of Service:  2019 NAME:  Teresa Arora :  1970 MRN:  [id number] Admission Summary:  
Patient at times is a limited historian. The remainder of history was obtained from review of ED and electronic medical records. The patient initially came to the ED as a transfer from [facility] Department for evaluation. Per the transfer reports, the patient complained of shortness of breath symptoms over the past few days. She also complained of having some chest pain located in the left anterior chest that was vaguely described as sharp, 6/10, without specific exacerbating or alleviating factors. Shortness of breath notably had worsened which was thought to be associated with CHF. She has a history of chronic hypoxic hypercapnic respiratory failure and is on 6 liters of oxygen at baseline during the day and on Trilogy with 6 liters of oxygen bleed in at nighttime Interval history / Subjective:  
Still c/o SOB no CP on BIPAP Assessment & Plan: 1. Acute on chronic hypoxic hypercapnic respiratory failure due to non compliance vs mechanical issue with trilogy - Place on BiPAP. Wean as tolerates,  continue pulse oximetry monitoring.   
- cont QUINN harris working on [employer] 2. Acute chest pain. - resolved trop x 1 negative doubt ACS - pt on asa and BB 3. Chronic obstructive pulmonary disease.   
- Nebulize with DuoNeb nebulizer treatments. 4.  Type 2 diabetes mellitus. Place on Humalog insulin correction coverage 5. Hypertension. Monitor blood pressure closely stable 7. Anxiety. Provide supportive cares 8. Lymphedema with leg wounds. Consult with wound care team. 
 
9. Obesity Code status: Full DVT prophylaxis: [facility] 
 
 Care Plan discussed with: Patient/Family and Nurse Disposition: TBD Hospital Problems  Date Reviewed: 8/23/2018 Codes Class Noted POA Acute chest pain ICD-10-CM: R07.9 ICD-9-CM: 786.50  1/3/2019 Unknown  
   
 SOB (shortness of breath) ICD-10-CM: R06.02 
ICD-9-CM: 786.05  9/22/2017 Unknown Review of Systems:  
Review of systems not obtained due to patient factors. Vital Signs:  
 Last 24hrs VS reviewed since prior progress note. Most recent are: 
Visit Vitals /85 Pulse (!) 105 Temp 98.1 °F (36.7 °C) Resp 18 Ht 5' 6\" (1.676 m) Wt (!) 189.6 kg (417 lb 15.9 oz) SpO2 98% Breastfeeding? No  
BMI 67.47 kg/m² No intake or output data in the 24 hours ending 01/04/19 1048 Physical Examination:  
 
 
     
Constitutional:  No acute distress, ENT:  Oral mucous moist, oropharynx benign. Resp:  CTA bilaterally. No wheezing/rhonchi/rales. CV:  Regular rhythm, normal rate, no murmurs GI:  Soft, non distended, non tender. bs+ Musculoskeletal:  edema 4+ chronic skin changes Neurologic:  Moves all extremities. AAOx3, CN II-XII reviewed Data Review:  
 I personally reviewed  Image and labs Labs:  
 
Recent Labs 01/04/19 
0411 WBC 7.8 HGB 11.1*  
HCT 38.3 PLT 51* Recent Labs 01/04/19 
0411   
K 3.8  CO2 35* BUN 16  
CREA 0.76 * CA 10.0 No results for input(s): SGOT, GPT, ALT, AP, TBIL, TBILI, TP, ALB, GLOB, GGT, AML, LPSE in the last 72 hours. No lab exists for component: AMYP, HLPSE No results for input(s): INR, PTP, APTT in the last 72 hours. No lab exists for component: INREXT No results for input(s): FE, TIBC, PSAT, FERR in the last 72 hours. No results found for: FOL, RBCF No results for input(s): PH, PCO2, PO2 in the last 72 hours. Recent Labs 01/03/19 
0031 TROIQ <0.05 Lab Results Component Value Date/Time Cholesterol, total 152 08/22/2018 02:15 AM  
 HDL Cholesterol 31 08/22/2018 02:15 AM  
 LDL, calculated 77.6 08/22/2018 02:15 AM  
 Triglyceride 217 (H) 08/22/2018 02:15 AM  
 CHOL/HDL Ratio 4.9 08/22/2018 02:15 AM  
 
Lab Results Component Value Date/Time Glucose (POC) 136 (H) 01/04/2019 06:59 AM  
 Glucose (POC) 142 (H) 01/03/2019 09:46 PM  
 Glucose (POC) 189 (H) 01/03/2019 05:56 PM  
 Glucose (POC) 205 (H) 01/03/2019 12:46 PM  
 Glucose (POC) 218 (H) 01/03/2019 10:03 AM  
 
Lab Results Component Value Date/Time Color YELLOW/STRAW 01/21/2018 09:18 AM  
 Appearance CLEAR 01/21/2018 09:18 AM  
 Specific gravity 1.009 01/21/2018 09:18 AM  
 Specific gravity 1.010 05/26/2015 02:43 AM  
 pH (UA) 6.0 01/21/2018 09:18 AM  
 Protein NEGATIVE  01/21/2018 09:18 AM  
 Glucose NEGATIVE  01/21/2018 09:18 AM  
 Ketone NEGATIVE  01/21/2018 09:18 AM  
 Bilirubin NEGATIVE  01/21/2018 09:18 AM  
 Urobilinogen 1.0 01/21/2018 09:18 AM  
 Nitrites NEGATIVE  01/21/2018 09:18 AM  
 Leukocyte Esterase NEGATIVE  01/21/2018 09:18 AM  
 Epithelial cells FEW 01/21/2018 09:18 AM  
 Bacteria NEGATIVE  01/21/2018 09:18 AM  
 WBC 0-4 01/21/2018 09:18 AM  
 RBC 0-5 01/21/2018 09:18 AM  
 
 
 
Medications Reviewed:  
 
Current Facility-Administered Medications Medication Dose Route Frequency  nystatin (MYCOSTATIN) 100,000 unit/gram powder   Topical BID  aspirin chewable tablet 81 mg  81 mg Oral DAILY  fluticasone (FLONASE) 50 mcg/actuation nasal spray 2 Spray  2 Spray Both Nostrils DAILY  loratadine (CLARITIN) tablet 10 mg  10 mg Oral DAILY  lovastatin (MEVACOR) tablet 40 mg  40 mg Oral QHS  polyethylene glycol (MIRALAX) packet 17 g  17 g Oral DAILY  sodium chloride (NS) flush 5-10 mL  5-10 mL IntraVENous Q8H  
 sodium chloride (NS) flush 5-10 mL  5-10 mL IntraVENous PRN  
 glucose chewable tablet 16 g  4 Tab Oral PRN  
 dextrose (D50W) injection syrg 12.5-25 g  25-50 mL IntraVENous PRN  
  glucagon (GLUCAGEN) injection 1 mg  1 mg IntraMUSCular PRN  
 insulin lispro (HUMALOG) injection   SubCUTAneous AC&HS  
 metoprolol tartrate (LOPRESSOR) tablet 25 mg  25 mg Oral BID  furosemide (LASIX) tablet 40 mg  40 mg Oral BID  
 glyBURIDE (DIABETA) tablet 5 mg  5 mg Oral ACB  insulin glargine (LANTUS) injection 35 Units  35 Units SubCUTAneous DAILY  budesonide (PULMICORT) 500 mcg/2 ml nebulizer suspension  500 mcg Nebulization BID RT  
 arformoterol (BROVANA) neb solution 15 mcg  15 mcg Nebulization BID RT  
 albuterol (PROVENTIL VENTOLIN) nebulizer solution 2.5 mg  2.5 mg Nebulization Q4H PRN  
 acetaminophen (TYLENOL) tablet 650 mg  650 mg Oral Q4H PRN  
 enoxaparin (LOVENOX) injection 40 mg  40 mg SubCUTAneous Q24H  
 lisinopril (PRINIVIL, ZESTRIL) tablet 40 mg  40 mg Oral DAILY  oxyCODONE IR (ROXICODONE) tablet 5 mg  5 mg Oral Q6H PRN  
 
______________________________________________________________________ EXPECTED LENGTH OF STAY: 3d 19h ACTUAL LENGTH OF STAY:          1 Sara Duran MD

## 2019-01-04 NOTE — PROGRESS NOTES
Problem: Falls - Risk of 
Goal: *Absence of Falls Document Garo Valentines Fall Risk and appropriate interventions in the flowsheet. Outcome: Progressing Towards Goal 
Fall Risk Interventions: 
Mobility Interventions: Communicate number of staff needed for ambulation/transfer, OT consult for ADLs, Patient to call before getting OOB, PT Consult for mobility concerns, Strengthening exercises (ROM-active/passive) Medication Interventions: Assess postural VS orthostatic hypotension, Evaluate medications/consider consulting pharmacy, Patient to call before getting OOB, Teach patient to arise slowly Elimination Interventions: Call light in reach, Patient to call for help with toileting needs, Toileting schedule/hourly rounds

## 2019-01-04 NOTE — PROGRESS NOTES
TRANSFER - OUT REPORT: 
 
Verbal report given to NORMA Bekrowitz(name) on John Adams  being transferred to [facility]) for routine progression of care Report consisted of patients Situation, Background, Assessment and  
Recommendations(SBAR). Information from the following report(s) SBAR, Kardex, ED Summary, Procedure Summary, MAR, Recent Results and Cardiac Rhythm NSR/ST was reviewed with the receiving nurse. Lines:  
Peripheral IV 01/03/19 Right Other(comment) (Active) Site Assessment Clean, dry, & intact 1/3/2019  8:00 AM  
Phlebitis Assessment 0 1/3/2019  8:00 AM  
Infiltration Assessment 0 1/3/2019  8:00 AM  
Dressing Status Clean, dry, & intact 1/3/2019  8:00 AM  
Dressing Type Tape;Transparent 1/3/2019  8:00 AM  
Hub Color/Line Status Pink;Capped;Flushed 1/3/2019  8:00 AM  
Action Taken Open ports on tubing capped 1/3/2019  8:00 AM  
Alcohol Cap Used Yes 1/3/2019  8:00 AM  
  
 
Opportunity for questions and clarification was provided. Patient transported with: 
 O2 @ 6 liters Patient's medications from home Registered Nurse Tech

## 2019-01-04 NOTE — PROGRESS NOTES
NUTRITION COMPLETE ASSESSMENT 
 
RECOMMENDATIONS:  
1.  Diabetic Consistent Carb with Options 2200 kcal, 2 gm Sodium 2. Continue daily weights 3. Continue to monitor BG closely for tight control to help optimize wound healing Interventions/Plan:  
Food/Nutrient Delivery: Increase calorie level in diet; encourage adequate protein and BG control for wound healing Assessment:  
Reason for Assessment:  
[x]BPA/MST Referral 
 
Diet:  Diabetic Consistent Carb Nutritionally Significant Medications: [x] Reviewed & Includes: lasix twice/day; glyburide 5 mg; lantus (35 units); humalog correction scale; miralax daily Meal Intake:  
Patient Vitals for the past 100 hrs: 
 % Diet Eaten 01/04/19 1216 60 % 01/04/19 0925 30 % 01/03/19 1000 75 % Current Hospitalization:  
Fluid Restriction:  NA Appetite:  Fair PO Ability: Independent Subjective: 
Pt asked RD to defer visit at this time. RD with DTC had just completed her visit, so the pt requested to take a nap. Objective: 
Chart reviewed. Pt admitted with SOB. PMHx: lymphedema, unstageable foot wound, CHF, DM2, COPD, others noted. Pt with increased protein needs in setting of wounds. Pt on Diabetic Diet. Intake documentation has been incomplete, but 75% of breakfast meal recorded yesterday. Will increase calorie level to better meet estimated needs. Current weight is up 10# x ~2 months and 27# x ~12 months. Will need to monitor trends. Estimated Nutrition Needs:  
Kcals/day: 2086 Kcals/day(0810-7049 kcal/day (11-15 kcal/kg)) Protein: 89 g( g/day (1.5-2 g/kg IBW)) Fluid: 2800 ml(1 mL/kcal) Based On: Kcal/kg - specify (Comment) Weight Used: Actual wt(189.6 kg) Pt expected to meet estimated nutrient needs:  []   Yes     []  No [x] Unable to predict at this time Nutrition Diagnosis:  
1. Increased nutrient needs related to unstageable foot wound as evidenced by increased protein needs Goals: Pt to consume at least 75% of meals over the next 5-7 days Monitoring & Evaluation: - Total energy intake - Weight/weight change Previous Nutrition Goals Met:   N/A Previous Recommendations:    N/A Education & Discharge Needs: 
 [x] None Identified 
 [] Identified and addressed [x] Participated in care plan, discharge planning, and/or interdisciplinary rounds Cultural, Pentecostalism and ethnic food preferences identified: None Skin Integrity: [x]Intact  []Other Edema: []None [x]Other: 2-4+ Last BM: 1/1/18 Food Allergies: [x]None []Other Diet Restrictions: Cultural/Restorationist Preference(s): None Anthropometrics:   
Weight Loss Metrics 1/4/2019 10/26/2018 9/27/2018 8/23/2018 7/12/2018 4/25/2018 2/1/2018 Today's Wt 417 lb 15.9 oz 407 lb 13.6 oz 416 lb 403 lb 3 oz 423 lb 1 oz 396 lb 396 lb 13.3 oz  
BMI 67.47 kg/m2 63.88 kg/m2 65.15 kg/m2 65.08 kg/m2 68.28 kg/m2 63.92 kg/m2 64.05 kg/m2 Weight Source: Standing scale (comment) Height: 5' 6\" (167.6 cm), Body mass index is 67.47 kg/m². IBW : 59 kg (130 lb),   
 ,   
 
Labs:   
Lab Results Component Value Date/Time Sodium 140 01/04/2019 04:11 AM  
 Potassium 3.8 01/04/2019 04:11 AM  
 Chloride 102 01/04/2019 04:11 AM  
 CO2 35 (H) 01/04/2019 04:11 AM  
 Glucose 132 (H) 01/04/2019 04:11 AM  
 BUN 16 01/04/2019 04:11 AM  
 Creatinine 0.76 01/04/2019 04:11 AM  
 Calcium 10.0 01/04/2019 04:11 AM  
 Magnesium 2.3 10/25/2018 04:15 PM  
 Phosphorus 2.6 10/25/2018 04:15 PM  
 Albumin 3.3 (L) 10/20/2018 09:14 PM  
 
Lab Results Component Value Date/Time Hemoglobin A1c 8.4 (H) 10/21/2018 04:36 AM  
 
Tash Bond, [address]

## 2019-01-04 NOTE — PROGRESS NOTES
2136-Paged MD Payam about patient refusing treatments and being verbally abuse to the ED nurses. Payam stated that if patient refuses to wear BiPap, that patient can be transferred to a remote tele bed.

## 2019-01-04 NOTE — PROGRESS NOTES
Problem: Breathing Pattern - Ineffective Goal: *Absence of hypoxia Outcome: Progressing Towards Goal 
Patient on BIPAP during night, ABG with slight retention of CO2, 6L/NC during day, diuresis in progress. SOB with activity noted O2 variable 90%-95% 1938-Bedside shift change report given to Allan Garcia  (oncoming nurse) by Adryan Monzon RN  (offgoing nurse). Report included the following information SBAR, Intake/Output, MAR and Recent Results.

## 2019-01-04 NOTE — PROGRESS NOTES
Reason for Admission:   Patient admitted from home with complaints of shortness of breath. She has oxygen at baseline 6/l does not know vendor. Also has trilogy at home that she states does not work. She has not call the vendor because she does not know the vendor. This CM will reach out to [address] [place] [address] for this information. RRAT Score:     30 Resources/supports as identified by patient/family:   Patient receives SSD/monthly. She is insured with Medicare [facility] and also Medicaid. She has personal caregivers 9-3 daily. And she has a nephew who also lives in the home. She ambulates with a walker. She has MD home visits with Starr Santos NP. Top Challenges facing patient (as identified by patient/family and CM): Finances/Medication cost?   See above Transportation? Medicaid transport. Support system or lack thereof? See above Living arrangements? See above Self-care/ADLs/Cognition? Caregivers help with bathing and housework. patient can dress self and feed self. She also ambulates with a walker. Current Advanced Directive/Advance Care Plan:  Copy on chart Plan for utilizing home health:    See above. Likelihood of readmission:  High risk/Red zone. Patient encouraged to follow up with pulmonary. Patient encouraged to call vendors when there is an issue with her DME. Transition of Care Plan:    Anticipate discharge to home with services prior to admission. Will need resumption of care order for home health. Care Management Interventions PCP Verified by CM: Yes(NP Gianluca home visits) Palliative Care Criteria Met (RRAT>21 & CHF Dx)?: No 
Mode of Transport at Discharge: S Transition of Care Consult (CM Consult): Home Health [address] [place] [address] Road:  No 
 Reason Outside [place]: Patient already serviced by other home care/hospice agency(Pt has caregivers 9-3 daily/open to [facility]) Current Support Network: Has Personal Caregivers, Own Home(Antonella lives with patient) Confirm Follow Up Transport: Self Plan discussed with Pt/Family/Caregiver: Yes [employer] Information Provided?: (NA) Discharge Location Discharge Placement: Home with home health Kendy Galvan RN CRM Ext [phone] This CM spoke with sleep lab and they do not know the name of vendor for Trilogy. Will continue to follow up.

## 2019-01-04 NOTE — DIABETES MGMT
DTC Consult Note Recommendations/ Comments:  
 
Current hospital DM medication: Lantus 35 units, Glyburide 5 mg daily and ,Humalog for correction, normal sensitivity scale Consult received for:  [x]             Assessment of home management Chart reviewed and initial evaluation complete on Yahaira Arroyo. Patient is a 57 y.o. female with hx Type 2 Diabetes on Lantus 40 units daily AM and Glyburide 5 mg daily at home. BG monitoring at home 3 times per day. Patient reports BG levels at home 140-300 mg/dl. Pt reports to drink regular soda, usually about 12 oz of pepsi per day and likes to drink gatorade. States to eat 3 meals a day Assessed and instructed patient on the following:  
·  interpretation of lab results, blood sugar goals, complications of diabetes mellitus, hypoglycemia prevention and treatment, SMBG skills, nutrition and referred to Diabetes Educator Encouraged the following:  
· increased exercise, dietary modifications: , regular blood sugar monitoring: 3 times daily Provided patient with the following: [x]             Survival skills education materials []             Insulin education materials []             CHO counting education materials [x]             Outpatient DTC contact number 
             []             Glucometer Discussed with patient and/or family need for follow up appointment for diabetes management after discharge. A1c:  
Lab Results Component Value Date/Time Hemoglobin A1c 8.4 (H) 10/21/2018 04:36 AM  
 
 
Recent Glucose Results:  
Lab Results Component Value Date/Time  (H) 01/04/2019 04:11 AM  
 GLUCPOC 148 (H) 01/04/2019 11:45 AM  
 GLUCPOC 136 (H) 01/04/2019 06:59 AM  
 GLUCPOC 142 (H) 01/03/2019 09:46 PM  
  
 
Lab Results Component Value Date/Time  Creatinine 0.76 01/04/2019 04:11 AM  
 
 Estimated Creatinine Clearance: 162.7 mL/min (based on SCr of 0.76 mg/dL). Active Orders Diet DIET DIABETIC WITH OPTIONS Consistent Carb 2200kcal; Regular; 2 GM NA (House Low NA) PO intake:  
Patient Vitals for the past 72 hrs: 
 % Diet Eaten 01/04/19 1216 60 % 01/04/19 0925 30 % 01/03/19 1000 75 % Will continue to follow as needed. Thank you. Bhavesh Rajput RD, CDE [facility] Pager: [phone] Time spent: 20 minutes

## 2019-01-05 LAB
ANION GAP SERPL CALC-SCNC: 4 MMOL/L (ref 5–15)
BUN SERPL-MCNC: 15 MG/DL (ref 6–20)
BUN/CREAT SERPL: 22 (ref 12–20)
CALCIUM SERPL-MCNC: 10.1 MG/DL (ref 8.5–10.1)
CHLORIDE SERPL-SCNC: 103 MMOL/L (ref 97–108)
CO2 SERPL-SCNC: 35 MMOL/L (ref 21–32)
CREAT SERPL-MCNC: 0.68 MG/DL (ref 0.55–1.02)
ERYTHROCYTE [DISTWIDTH] IN BLOOD BY AUTOMATED COUNT: 13.1 % (ref 11.5–14.5)
GLUCOSE BLD STRIP.AUTO-MCNC: 110 MG/DL (ref 65–100)
GLUCOSE BLD STRIP.AUTO-MCNC: 144 MG/DL (ref 65–100)
GLUCOSE BLD STRIP.AUTO-MCNC: 155 MG/DL (ref 65–100)
GLUCOSE BLD STRIP.AUTO-MCNC: 161 MG/DL (ref 65–100)
GLUCOSE SERPL-MCNC: 119 MG/DL (ref 65–100)
HCT VFR BLD AUTO: 37.8 % (ref 35–47)
HGB BLD-MCNC: 10.9 G/DL (ref 11.5–16)
MCH RBC QN AUTO: 28.8 PG (ref 26–34)
MCHC RBC AUTO-ENTMCNC: 28.8 G/DL (ref 30–36.5)
MCV RBC AUTO: 100 FL (ref 80–99)
NRBC # BLD: 0 K/UL (ref 0–0.01)
NRBC BLD-RTO: 0 PER 100 WBC
PLATELET # BLD AUTO: 147 K/UL (ref 150–400)
PMV BLD AUTO: 11.5 FL (ref 8.9–12.9)
POTASSIUM SERPL-SCNC: 4 MMOL/L (ref 3.5–5.1)
RBC # BLD AUTO: 3.78 M/UL (ref 3.8–5.2)
SERVICE CMNT-IMP: ABNORMAL
SODIUM SERPL-SCNC: 142 MMOL/L (ref 136–145)
TROPONIN I SERPL-MCNC: <0.05 NG/ML
WBC # BLD AUTO: 7.1 K/UL (ref 3.6–11)

## 2019-01-05 PROCEDURE — 65660000000 HC RM CCU STEPDOWN

## 2019-01-05 PROCEDURE — 94640 AIRWAY INHALATION TREATMENT: CPT

## 2019-01-05 PROCEDURE — 74011636637 HC RX REV CODE- 636/637: Performed by: HOSPITALIST

## 2019-01-05 PROCEDURE — 74011250637 HC RX REV CODE- 250/637: Performed by: HOSPITALIST

## 2019-01-05 PROCEDURE — 80048 BASIC METABOLIC PNL TOTAL CA: CPT

## 2019-01-05 PROCEDURE — 36415 COLL VENOUS BLD VENIPUNCTURE: CPT

## 2019-01-05 PROCEDURE — 85027 COMPLETE CBC AUTOMATED: CPT

## 2019-01-05 PROCEDURE — 84484 ASSAY OF TROPONIN QUANT: CPT

## 2019-01-05 PROCEDURE — 82962 GLUCOSE BLOOD TEST: CPT

## 2019-01-05 PROCEDURE — 74011000250 HC RX REV CODE- 250: Performed by: INTERNAL MEDICINE

## 2019-01-05 PROCEDURE — 74011636637 HC RX REV CODE- 636/637: Performed by: FAMILY MEDICINE

## 2019-01-05 PROCEDURE — 74011250637 HC RX REV CODE- 250/637: Performed by: FAMILY MEDICINE

## 2019-01-05 RX ADMIN — METOPROLOL TARTRATE 25 MG: 25 TABLET ORAL at 17:16

## 2019-01-05 RX ADMIN — ARFORMOTEROL TARTRATE 15 MCG: 15 SOLUTION RESPIRATORY (INHALATION) at 20:53

## 2019-01-05 RX ADMIN — METOPROLOL TARTRATE 25 MG: 25 TABLET ORAL at 08:59

## 2019-01-05 RX ADMIN — ARFORMOTEROL TARTRATE 15 MCG: 15 SOLUTION RESPIRATORY (INHALATION) at 07:36

## 2019-01-05 RX ADMIN — LORATADINE 10 MG: 10 TABLET ORAL at 08:59

## 2019-01-05 RX ADMIN — NYSTATIN: 100000 POWDER TOPICAL at 09:01

## 2019-01-05 RX ADMIN — FLUTICASONE PROPIONATE 2 SPRAY: 50 SPRAY, METERED NASAL at 09:01

## 2019-01-05 RX ADMIN — INSULIN LISPRO 2 UNITS: 100 INJECTION, SOLUTION INTRAVENOUS; SUBCUTANEOUS at 17:16

## 2019-01-05 RX ADMIN — Medication 10 ML: at 05:08

## 2019-01-05 RX ADMIN — FUROSEMIDE 40 MG: 40 TABLET ORAL at 08:59

## 2019-01-05 RX ADMIN — LOVASTATIN 40 MG: 20 TABLET ORAL at 21:56

## 2019-01-05 RX ADMIN — NYSTATIN: 100000 POWDER TOPICAL at 17:15

## 2019-01-05 RX ADMIN — INSULIN LISPRO 2 UNITS: 100 INJECTION, SOLUTION INTRAVENOUS; SUBCUTANEOUS at 12:26

## 2019-01-05 RX ADMIN — Medication 10 ML: at 15:02

## 2019-01-05 RX ADMIN — CYCLOBENZAPRINE HYDROCHLORIDE 5 MG: 10 TABLET, FILM COATED ORAL at 17:15

## 2019-01-05 RX ADMIN — Medication 10 ML: at 21:56

## 2019-01-05 RX ADMIN — BUDESONIDE 500 MCG: 0.5 INHALANT RESPIRATORY (INHALATION) at 20:53

## 2019-01-05 RX ADMIN — CYCLOBENZAPRINE HYDROCHLORIDE 5 MG: 10 TABLET, FILM COATED ORAL at 08:59

## 2019-01-05 RX ADMIN — GLYBURIDE 5 MG: 5 TABLET ORAL at 07:30

## 2019-01-05 RX ADMIN — FUROSEMIDE 40 MG: 40 TABLET ORAL at 17:16

## 2019-01-05 RX ADMIN — BUDESONIDE 500 MCG: 0.5 INHALANT RESPIRATORY (INHALATION) at 07:37

## 2019-01-05 RX ADMIN — INSULIN GLARGINE 35 UNITS: 100 INJECTION, SOLUTION SUBCUTANEOUS at 09:08

## 2019-01-05 RX ADMIN — ASPIRIN 81 MG CHEWABLE TABLET 81 MG: 81 TABLET CHEWABLE at 08:59

## 2019-01-05 NOTE — PROGRESS NOTES
Problem: Breathing Pattern - Ineffective Goal: *Absence of hypoxia Outcome: Progressing Towards Goal 
Patient maintaining O2 sats on 6L.

## 2019-01-05 NOTE — PROGRESS NOTES
Problem: Impaired Skin Integrity/Pressure Injury Treatment Goal: *Improvement of Existing Pressure Injury Outcome: Progressing Towards Goal 
Daily skin assessment performed, wound care performed daily, nystatin applied to abdominal folds Goal: *Prevention of pressure injury Document Jeffrey Scale and appropriate interventions in the flowsheet. Outcome: Progressing Towards Goal 
Pressure Injury Interventions: 
Sensory Interventions: Assess changes in LOC, Keep linens dry and wrinkle-free, Pressure redistribution bed/mattress (bed type), Turn and reposition approx. every two hours (pillows and wedges if needed) Moisture Interventions: Absorbent underpads, Check for incontinence Q2 hours and as needed, Limit adult briefs, Minimize layers, Maintain skin hydration (lotion/cream) Activity Interventions: Pressure redistribution bed/mattress(bed type) Mobility Interventions: HOB 30 degrees or less Nutrition Interventions: Document food/fluid/supplement intake Friction and Shear Interventions: HOB 30 degrees or less Problem: Breathing Pattern - Ineffective Goal: *Absence of hypoxia Outcome: Progressing Towards Goal 
Patient on 6L/NC, O2 95%. SOB with exertion, diuresing with lasix 1953-Bedside shift change report given to [facility] RN  (oncoming nurse) by Raquel Herrera  (offgoing nurse). Report included the following information SBAR, Intake/Output, MAR and Recent Results.

## 2019-01-06 VITALS
RESPIRATION RATE: 16 BRPM | DIASTOLIC BLOOD PRESSURE: 67 MMHG | WEIGHT: 293 LBS | SYSTOLIC BLOOD PRESSURE: 99 MMHG | HEIGHT: 66 IN | HEART RATE: 81 BPM | OXYGEN SATURATION: 100 % | TEMPERATURE: 98.1 F | BODY MASS INDEX: 47.09 KG/M2

## 2019-01-06 PROBLEM — R06.02 SOB (SHORTNESS OF BREATH): Status: RESOLVED | Noted: 2017-09-22 | Resolved: 2019-01-06

## 2019-01-06 LAB
ANION GAP SERPL CALC-SCNC: 5 MMOL/L (ref 5–15)
BUN SERPL-MCNC: 14 MG/DL (ref 6–20)
BUN/CREAT SERPL: 19 (ref 12–20)
CALCIUM SERPL-MCNC: 10.2 MG/DL (ref 8.5–10.1)
CHLORIDE SERPL-SCNC: 101 MMOL/L (ref 97–108)
CO2 SERPL-SCNC: 34 MMOL/L (ref 21–32)
CREAT SERPL-MCNC: 0.75 MG/DL (ref 0.55–1.02)
ERYTHROCYTE [DISTWIDTH] IN BLOOD BY AUTOMATED COUNT: 13.1 % (ref 11.5–14.5)
GLUCOSE BLD STRIP.AUTO-MCNC: 113 MG/DL (ref 65–100)
GLUCOSE BLD STRIP.AUTO-MCNC: 175 MG/DL (ref 65–100)
GLUCOSE SERPL-MCNC: 119 MG/DL (ref 65–100)
HCT VFR BLD AUTO: 36.6 % (ref 35–47)
HGB BLD-MCNC: 10.3 G/DL (ref 11.5–16)
MCH RBC QN AUTO: 27.8 PG (ref 26–34)
MCHC RBC AUTO-ENTMCNC: 28.1 G/DL (ref 30–36.5)
MCV RBC AUTO: 98.9 FL (ref 80–99)
NRBC # BLD: 0 K/UL (ref 0–0.01)
NRBC BLD-RTO: 0 PER 100 WBC
PLATELET # BLD AUTO: 163 K/UL (ref 150–400)
PMV BLD AUTO: 11.1 FL (ref 8.9–12.9)
POTASSIUM SERPL-SCNC: 4.1 MMOL/L (ref 3.5–5.1)
RBC # BLD AUTO: 3.7 M/UL (ref 3.8–5.2)
SERVICE CMNT-IMP: ABNORMAL
SERVICE CMNT-IMP: ABNORMAL
SODIUM SERPL-SCNC: 140 MMOL/L (ref 136–145)
WBC # BLD AUTO: 7.5 K/UL (ref 3.6–11)

## 2019-01-06 PROCEDURE — 36415 COLL VENOUS BLD VENIPUNCTURE: CPT

## 2019-01-06 PROCEDURE — 74011250637 HC RX REV CODE- 250/637: Performed by: FAMILY MEDICINE

## 2019-01-06 PROCEDURE — 74011636637 HC RX REV CODE- 636/637: Performed by: HOSPITALIST

## 2019-01-06 PROCEDURE — 77010033678 HC OXYGEN DAILY

## 2019-01-06 PROCEDURE — 74011636637 HC RX REV CODE- 636/637: Performed by: FAMILY MEDICINE

## 2019-01-06 PROCEDURE — 74011000250 HC RX REV CODE- 250: Performed by: INTERNAL MEDICINE

## 2019-01-06 PROCEDURE — 94640 AIRWAY INHALATION TREATMENT: CPT

## 2019-01-06 PROCEDURE — 85027 COMPLETE CBC AUTOMATED: CPT

## 2019-01-06 PROCEDURE — 80048 BASIC METABOLIC PNL TOTAL CA: CPT

## 2019-01-06 PROCEDURE — 94660 CPAP INITIATION&MGMT: CPT

## 2019-01-06 PROCEDURE — 74011250637 HC RX REV CODE- 250/637: Performed by: HOSPITALIST

## 2019-01-06 PROCEDURE — 94762 N-INVAS EAR/PLS OXIMTRY CONT: CPT

## 2019-01-06 PROCEDURE — 82962 GLUCOSE BLOOD TEST: CPT

## 2019-01-06 RX ORDER — IPRATROPIUM BROMIDE AND ALBUTEROL SULFATE 2.5; .5 MG/3ML; MG/3ML
3 SOLUTION RESPIRATORY (INHALATION)
Qty: 30 NEBULE | Refills: 0 | Status: SHIPPED | OUTPATIENT
Start: 2019-01-06 | End: 2019-01-08 | Stop reason: DRUGHIGH

## 2019-01-06 RX ORDER — NEBULIZER AND COMPRESSOR
EACH MISCELLANEOUS
Qty: 1 EACH | Refills: 0 | Status: SHIPPED | OUTPATIENT
Start: 2019-01-06 | End: 2019-01-08

## 2019-01-06 RX ORDER — PREDNISONE 10 MG/1
TABLET ORAL
Qty: 18 TAB | Refills: 0 | Status: ON HOLD | OUTPATIENT
Start: 2019-01-06 | End: 2019-01-10 | Stop reason: SDUPTHER

## 2019-01-06 RX ORDER — FUROSEMIDE 40 MG/1
60 TABLET ORAL 2 TIMES DAILY
Qty: 90 TAB | Refills: 0 | Status: SHIPPED | OUTPATIENT
Start: 2019-01-06 | End: 2019-01-08 | Stop reason: DRUGHIGH

## 2019-01-06 RX ORDER — FLUTICASONE PROPIONATE 50 MCG
2 SPRAY, SUSPENSION (ML) NASAL DAILY
Qty: 1 BOTTLE | Refills: 0 | Status: SHIPPED | OUTPATIENT
Start: 2019-01-06

## 2019-01-06 RX ADMIN — GLYBURIDE 5 MG: 5 TABLET ORAL at 06:53

## 2019-01-06 RX ADMIN — NYSTATIN: 100000 POWDER TOPICAL at 08:34

## 2019-01-06 RX ADMIN — INSULIN GLARGINE 35 UNITS: 100 INJECTION, SOLUTION SUBCUTANEOUS at 08:38

## 2019-01-06 RX ADMIN — OXYCODONE HYDROCHLORIDE 5 MG: 5 TABLET ORAL at 08:37

## 2019-01-06 RX ADMIN — CYCLOBENZAPRINE HYDROCHLORIDE 5 MG: 10 TABLET, FILM COATED ORAL at 16:24

## 2019-01-06 RX ADMIN — FLUTICASONE PROPIONATE 2 SPRAY: 50 SPRAY, METERED NASAL at 08:36

## 2019-01-06 RX ADMIN — ARFORMOTEROL TARTRATE 15 MCG: 15 SOLUTION RESPIRATORY (INHALATION) at 07:34

## 2019-01-06 RX ADMIN — METOPROLOL TARTRATE 25 MG: 25 TABLET ORAL at 08:37

## 2019-01-06 RX ADMIN — ASPIRIN 81 MG CHEWABLE TABLET 81 MG: 81 TABLET CHEWABLE at 08:37

## 2019-01-06 RX ADMIN — FUROSEMIDE 40 MG: 40 TABLET ORAL at 08:38

## 2019-01-06 RX ADMIN — Medication: at 08:34

## 2019-01-06 RX ADMIN — Medication 10 ML: at 06:54

## 2019-01-06 RX ADMIN — BUDESONIDE 500 MCG: 0.5 INHALANT RESPIRATORY (INHALATION) at 07:34

## 2019-01-06 RX ADMIN — INSULIN LISPRO 2 UNITS: 100 INJECTION, SOLUTION INTRAVENOUS; SUBCUTANEOUS at 11:30

## 2019-01-06 RX ADMIN — Medication 10 ML: at 14:00

## 2019-01-06 RX ADMIN — LORATADINE 10 MG: 10 TABLET ORAL at 08:37

## 2019-01-06 NOTE — PROGRESS NOTES
Problem: Breathing Pattern - Ineffective Goal: *Absence of hypoxia Outcome: Progressing Towards Goal 
Pt tolerating 6L NC and trilogy at night. O2 This is pt's baseline, pt is anxious to be discharged and go home. Dr. Sam Zelaya paged and notified pt is requesting discharge. Will continue to monitor and educate. I have reviewed discharge instructions with the patient. The patient verbalized understanding. I went over follow up appointments including heart failure appointment and all prescriptions. Pt was given opportunity for clarification and questions.

## 2019-01-06 NOTE — PROGRESS NOTES
Bedside and Verbal shift change report given to Rey Flannery RN (oncoming nurse) by Huber Donis RN (offgoing nurse). Report included the following information SBAR, Kardex, Recent Results and Cardiac Rhythm NSR.

## 2019-01-06 NOTE — PROGRESS NOTES
Problem: Impaired Skin Integrity/Pressure Injury Treatment Goal: *Prevention of pressure injury Document Jeffrey Scale and appropriate interventions in the flowsheet. Outcome: Progressing Towards Goal 
Pressure Injury Interventions: 
Sensory Interventions: Assess changes in LOC, Assess need for specialty bed, Check visual cues for pain, Pressure redistribution bed/mattress (bed type), Minimize linen layers Moisture Interventions: Assess need for specialty bed, Minimize layers, Limit adult briefs Activity Interventions: Increase time out of bed, Assess need for specialty bed, Pressure redistribution bed/mattress(bed type) Mobility Interventions: Assess need for specialty bed, Pressure redistribution bed/mattress (bed type) Nutrition Interventions: Document food/fluid/supplement intake, Offer support with meals,snacks and hydration Friction and Shear Interventions: Minimize layers Problem: Falls - Risk of 
Goal: *Absence of Falls Document Ale Ramirez Fall Risk and appropriate interventions in the flowsheet. Fall Risk Interventions: 
Mobility Interventions: Communicate number of staff needed for ambulation/transfer, Patient to call before getting OOB Medication Interventions: Assess postural VS orthostatic hypotension, Evaluate medications/consider consulting pharmacy, Patient to call before getting OOB, Teach patient to arise slowly Elimination Interventions: Call light in reach, Patient to call for help with toileting needs Problem: Pressure Injury - Risk of 
Goal: *Prevention of pressure injury Document Jeffrey Scale and appropriate interventions in the flowsheet. Outcome: Progressing Towards Goal 
Pressure Injury Interventions: 
Sensory Interventions: Assess changes in LOC, Assess need for specialty bed, Check visual cues for pain, Pressure redistribution bed/mattress (bed type), Minimize linen layers Moisture Interventions: Assess need for specialty bed, Minimize layers, Limit adult briefs Activity Interventions: Increase time out of bed, Assess need for specialty bed, Pressure redistribution bed/mattress(bed type) Mobility Interventions: Assess need for specialty bed, Pressure redistribution bed/mattress (bed type) Nutrition Interventions: Document food/fluid/supplement intake, Offer support with meals,snacks and hydration Friction and Shear Interventions: Minimize layers Problem: Risk for Spread of Infection Goal: Prevent transmission of infectious organism to others Prevent the transmission of infectious organisms to other patients, staff members, and visitors. Outcome: Progressing Towards Goal 
Pt on contact precautions for ESBL in urine. Contact cart an appropriate PPE placed outside of pt.'s door.

## 2019-01-06 NOTE — PROGRESS NOTES
Spiritual Care Partner Volunteer visited patient in Rm 405 on 1/6/19. Documented by: Chaplain Olsen MDiv, MACE 
[phone])

## 2019-01-06 NOTE — PROGRESS NOTES
Care Management - Received call from patient's nurse that patient is ready for discharge today and will need home health to resume and will need ambulance transportation home. Patient wants to make sure that they have a bariatric stretcher and oxygen with them. Sent referral via [employer] to [facility]. Spoke with Jack Alfaro, on call RN with [facility]. They will accept patient back. Sent referral via [employer] for ambulance transport home to [facility]. They accepted the transportation with ETA of 4:30 PM. [facility] confirmed they have her set up with a bariatric stretcher and oxygen.  
 
ROSCOE Cornejo

## 2019-01-06 NOTE — DISCHARGE INSTRUCTIONS
HOSPITALIST DISCHARGE INSTRUCTIONS    NAME: Ernesto Cobian   :  1970   MRN:  [id number]     Date/Time:  2019 12:28 PM    ADMIT DATE: 2019   DISCHARGE DATE: 2019     Attending Physician: Sam Jamison MD    DISCHARGE DIAGNOSIS:  Resp distress  Obstructive sleep apnea    Medications: Per above medication reconciliation. Pain Management: per above medications    Recommended diet: Low fat, Low cholesterol    Recommended activity: Activity as tolerated    Code status: Full    Outside physician follow up: Follow-up Information     Follow up With Specialties Details Why Contact Info    Chayo Valdez NP Nurse Practitioner   [address] [facility]  [address]  [phone]                Information obtained by :  I understand that if any problems occur once I am at home I am to contact my physician. I understand and acknowledge receipt of the instructions indicated above.                                                                                                                                            Physician's or R.N.'s Signature                                                                  Date/Time                                                                                                                                              Patient or Repres

## 2019-01-06 NOTE — DISCHARGE SUMMARY
Hospitalist Discharge Summary     Patient ID:  Barbara Hargrove  [id number]  89 y.o.  1970    PCP on record: Christa Mullins NP    Admit date: 1/2/2019  Discharge date and time: 1/6/2019      DISCHARGE DIAGNOSIS:  Acute on chronic hypoxic respiratory failure  Diastolic CHF acute on chronic. [facility] class 3  Morbid obesity hypo ventilatory syndrome. COPD exacerbation  DM2  HTN      CONSULTATIONS:  IP CONSULT TO HOSPITALIST  IP CONSULT TO PULMONOLOGY    Excerpted HPI from H&P of Jackelin Higgins MD:  Patient is 58 y/o morbidly obese female, transfer from [facility] Emergency Department for shortness of breath symptoms over the past few days. She also complained of having some chest pain located in the left anterior chest that was vaguely described as sharp, 6/10, without specific exacerbating or alleviating factors. Shortness of breath notably had worsened which was thought to be associated with CHF. She has a history of chronic hypoxic hypercapnic respiratory failure and is on 6 liters of oxygen at baseline during the day and on Trilogy with 6 liters of oxygen bleed in at nighttime. On arrival, repeat chest x-ray, PA and lateral, showed no acute process. Arterial blood gas showed a pH of 7.378, pCO2 of 58.5, pO2 of 58, bicarbonate 34.5, base excess of 9, SaO2 of 80% on 5 liters oxygen nasal cannula. She has chronic lymphedema and has wounds on her legs where she has dressings applied. ______________________________________________________________________  DISCHARGE SUMMARY/HOSPITAL COURSE:  for full details see H&P, daily progress notes, labs, consult notes. Miguel Otto on chronic hypoxic hypercapnic respiratory failure. Pt was admitted to  hospital, started on Bipap, lasix, and nebulizer therapy. Seen in consultation with pulmonary. Doing better this morning less SOB,off of BiPAP on 6 liter NC at her baseline. Pt is very anxious and wants to go home.  Will start tapering dose of prednisone and increase the dose for lasix. Also place on duoneb as outpt.  F/U with pulmonologist as out Pt. CM to arrange triology before discharge. Also consult home health. .   Acute chest pain.  Improved, ruled out ACS, troponin is negative. .   Chronic obstructive pulmonary disease.  Continue current management and adjust as needed.    .   Type 2 diabetes mellitus.  Continue current management and adjust as needed . .   Hypertension. BP under control continue current management and adjust meds as needed. _______________________________________________________________________  Patient seen and examined by me on discharge day. Pertinent Findings:  Gen:    Not in distress  Chest: Clear lungs  CVS:   Regular rhythm. No edema  Abd:  Soft, not distended, not tender  Neuro:  Alert, O X 3.  _______________________________________________________________________  DISCHARGE MEDICATIONS:   Current Discharge Medication List      START taking these medications    Details   predniSONE (DELTASONE) 10 mg tablet 3 tablets daily for 3 days, then 2 tablets daily for 3 days, then 1 tablet daily for 3 days. Qty: 18 Tab, Refills: 0      albuterol-ipratropium (DUO-NEB) 2.5 mg-0.5 mg/3 ml nebu 3 mL by Nebulization route every four (4) hours as needed. Qty: 30 Nebule, Refills: 0      Nebulizer & Compressor machine Use as needed  Qty: 1 Each, Refills: 0         CONTINUE these medications which have CHANGED    Details   fluticasone (FLONASE) 50 mcg/actuation nasal spray 2 Sprays by Both Nostrils route daily. Qty: 1 Bottle, Refills: 0      furosemide (LASIX) 40 mg tablet Take 1.5 Tabs by mouth two (2) times a day. Qty: 90 Tab, Refills: 0         CONTINUE these medications which have NOT CHANGED    Details   insulin glargine (LANTUS) 100 unit/mL injection 40 Units by SubCUTAneous route daily. metoprolol tartrate (LOPRESSOR) 25 mg tablet Take 25 mg by mouth two (2) times a day.       lisinopril (PRINIVIL, ZESTRIL) 40 mg tablet Take 40 mg by mouth daily. medroxyPROGESTERone (DEPO-PROVERA) 150 mg/mL injection 150 mg by IntraMUSCular route every three (3) months. fluticasone-salmeterol (ADVAIR DISKUS) 500-50 mcg/dose diskus inhaler Take 1 Puff by inhalation every twelve (12) hours. loratadine (CLARITIN) 10 mg tablet Take 10 mg by mouth daily. albuterol (VENTOLIN HFA) 90 mcg/actuation inhaler Take 2 Puffs by inhalation every six (6) hours as needed for Wheezing. cyclobenzaprine (FLEXERIL) 5 mg tablet Take 5 mg by mouth three (3) times daily as needed. flexeriol       aspirin 81 mg chewable tablet Take 81 mg by mouth daily. hydrOXYzine pamoate (VISTARIL) 50 mg capsule Take 50 mg by mouth every eight (8) hours as needed for Itching. Indications: Pruritus of Skin      lovastatin (MEVACOR) 40 mg tablet Take 1 Tab by mouth nightly. Qty: 30 Tab, Refills: 6    Associated Diagnoses: Atherosclerosis of native coronary artery without angina pectoris      glyBURIDE (DIABETA) 5 mg tablet Take 5 mg by mouth Daily (before breakfast). ammonium lactate (LAC-HYDRIN) 12 % topical cream rub in to affected area well  Qty: 280 g, Refills: 1      nitroglycerin (NITROSTAT) 0.4 mg SL tablet 1 Tab by SubLINGual route every five (5) minutes as needed for Chest Pain (call 911 if not relieved by 3). Qty: 25 Tab, Refills: 2    Associated Diagnoses: SVT (supraventricular tachycardia) ([facility]); CHF (congestive heart failure) ([facility])         STOP taking these medications       polyethylene glycol (MIRALAX) 17 gram packet Comments:   Reason for Stopping:               My Recommended Diet, Activity, Wound Care, and follow-up labs are listed in the patient's Discharge Insturctions which I have personally completed and reviewed.     ______________________________________________________________________    Risk of deterioration: High    Condition at Discharge: Stable  ______________________________________________________________________    Disposition  Home with family and home health services  ______________________________________________________________________    Care Plan discussed with:   Patient, RN    ______________________________________________________________________    Code Status: Full Code  ______________________________________________________________________      Follow up with:   PCP : Tristen Alberts NP  Follow-up Information     Follow up With Specialties Details Why Contact Info    Tristen Alberts NP Nurse Practitioner   [address]  [address]  [phone]                Total time in minutes spent coordinating this discharge (includes going over instructions, follow-up, prescriptions, and preparing report for sign off to her PCP) : more than 30 minutes    Signed:  Raquel Corbett MD

## 2019-01-07 ENCOUNTER — APPOINTMENT (OUTPATIENT)
Dept: CT IMAGING | Age: 49
End: 2019-01-07
Attending: EMERGENCY MEDICINE
Payer: MEDICARE

## 2019-01-07 ENCOUNTER — HOSPITAL ENCOUNTER (OUTPATIENT)
Age: 49
Setting detail: OBSERVATION
Discharge: HOME HEALTH | End: 2019-01-10
Attending: EMERGENCY MEDICINE | Admitting: INTERNAL MEDICINE
Payer: MEDICARE

## 2019-01-07 ENCOUNTER — TELEPHONE (OUTPATIENT)
Dept: CASE MANAGEMENT | Age: 49
End: 2019-01-07

## 2019-01-07 DIAGNOSIS — J96.21 ACUTE ON CHRONIC RESPIRATORY FAILURE WITH HYPOXIA AND HYPERCAPNIA (HCC): Primary | ICD-10-CM

## 2019-01-07 DIAGNOSIS — J44.9 CHRONIC OBSTRUCTIVE PULMONARY DISEASE, UNSPECIFIED COPD TYPE (HCC): ICD-10-CM

## 2019-01-07 DIAGNOSIS — J96.22 ACUTE ON CHRONIC RESPIRATORY FAILURE WITH HYPOXIA AND HYPERCAPNIA (HCC): Primary | ICD-10-CM

## 2019-01-07 DIAGNOSIS — J06.9 UPPER RESPIRATORY TRACT INFECTION, UNSPECIFIED TYPE: ICD-10-CM

## 2019-01-07 DIAGNOSIS — Z99.81 CHRONIC RESPIRATORY FAILURE WITH HYPOXIA, ON HOME OXYGEN THERAPY (HCC): ICD-10-CM

## 2019-01-07 DIAGNOSIS — J96.11 CHRONIC RESPIRATORY FAILURE WITH HYPOXIA, ON HOME OXYGEN THERAPY (HCC): ICD-10-CM

## 2019-01-07 DIAGNOSIS — Z91.14 NON COMPLIANCE W MEDICATION REGIMEN: ICD-10-CM

## 2019-01-07 PROCEDURE — 99285 EMERGENCY DEPT VISIT HI MDM: CPT

## 2019-01-07 PROCEDURE — 71275 CT ANGIOGRAPHY CHEST: CPT

## 2019-01-07 PROCEDURE — 93005 ELECTROCARDIOGRAM TRACING: CPT

## 2019-01-07 RX ORDER — SODIUM CHLORIDE 9 MG/ML
50 INJECTION, SOLUTION INTRAVENOUS
Status: COMPLETED | OUTPATIENT
Start: 2019-01-07 | End: 2019-01-08

## 2019-01-07 RX ORDER — SODIUM CHLORIDE 0.9 % (FLUSH) 0.9 %
10 SYRINGE (ML) INJECTION
Status: COMPLETED | OUTPATIENT
Start: 2019-01-07 | End: 2019-01-08

## 2019-01-07 NOTE — TELEPHONE ENCOUNTER
Spoke with Ms. Jorge Mike. She states she is homebound. She is unable to go to after hours/weekend visit scheduled by nursing unit at discharge. She is seen by [facility]. Home health has been ordered. Patrica Martinez. Patient states she has her medications. [address] A nurse is coming to see patient today. [address] [facility] [phone]. Message left.

## 2019-01-08 ENCOUNTER — TELEPHONE (OUTPATIENT)
Dept: CASE MANAGEMENT | Age: 49
End: 2019-01-08

## 2019-01-08 LAB
ANION GAP SERPL CALC-SCNC: 10 MMOL/L (ref 5–15)
ANION GAP SERPL CALC-SCNC: 6 MMOL/L (ref 5–15)
ARTERIAL PATENCY WRIST A: YES
ARTERIAL PATENCY WRIST A: YES
ATRIAL RATE: 102 BPM
ATRIAL RATE: 86 BPM
BASE EXCESS BLDA CALC-SCNC: 7 MMOL/L
BASE EXCESS BLDA CALC-SCNC: 8.5 MMOL/L
BASOPHILS # BLD: 0 K/UL (ref 0–0.1)
BASOPHILS NFR BLD: 0 % (ref 0–1)
BDY SITE: ABNORMAL
BDY SITE: ABNORMAL
BNP SERPL-MCNC: 242 PG/ML (ref 0–125)
BREATHS.SPONTANEOUS ON VENT: 20
BREATHS.SPONTANEOUS ON VENT: 22
BUN SERPL-MCNC: 13 MG/DL (ref 6–20)
BUN SERPL-MCNC: 14 MG/DL (ref 6–20)
BUN/CREAT SERPL: 16 (ref 12–20)
BUN/CREAT SERPL: 19 (ref 12–20)
CALCIUM SERPL-MCNC: 10.3 MG/DL (ref 8.5–10.1)
CALCIUM SERPL-MCNC: 10.7 MG/DL (ref 8.5–10.1)
CALCULATED P AXIS, ECG09: 58 DEGREES
CALCULATED P AXIS, ECG09: 62 DEGREES
CALCULATED R AXIS, ECG10: 73 DEGREES
CALCULATED R AXIS, ECG10: 74 DEGREES
CALCULATED T AXIS, ECG11: 52 DEGREES
CALCULATED T AXIS, ECG11: 56 DEGREES
CHLORIDE SERPL-SCNC: 99 MMOL/L (ref 97–108)
CHLORIDE SERPL-SCNC: 99 MMOL/L (ref 97–108)
CO2 SERPL-SCNC: 30 MMOL/L (ref 21–32)
CO2 SERPL-SCNC: 30 MMOL/L (ref 21–32)
COMMENT, HOLDF: NORMAL
CREAT SERPL-MCNC: 0.73 MG/DL (ref 0.55–1.02)
CREAT SERPL-MCNC: 0.8 MG/DL (ref 0.55–1.02)
DIAGNOSIS, 93000: NORMAL
DIAGNOSIS, 93000: NORMAL
DIFFERENTIAL METHOD BLD: ABNORMAL
EOSINOPHIL # BLD: 0 K/UL (ref 0–0.4)
EOSINOPHIL NFR BLD: 0 % (ref 0–7)
ERYTHROCYTE [DISTWIDTH] IN BLOOD BY AUTOMATED COUNT: 13.1 % (ref 11.5–14.5)
ERYTHROCYTE [DISTWIDTH] IN BLOOD BY AUTOMATED COUNT: 13.2 % (ref 11.5–14.5)
GAS FLOW.O2 O2 DELIVERY SYS: 3.5 L/MIN
GAS FLOW.O2 O2 DELIVERY SYS: 4 L/MIN
GLUCOSE BLD STRIP.AUTO-MCNC: 196 MG/DL (ref 65–100)
GLUCOSE BLD STRIP.AUTO-MCNC: 212 MG/DL (ref 65–100)
GLUCOSE BLD STRIP.AUTO-MCNC: 265 MG/DL (ref 65–100)
GLUCOSE BLD STRIP.AUTO-MCNC: 333 MG/DL (ref 65–100)
GLUCOSE SERPL-MCNC: 167 MG/DL (ref 65–100)
GLUCOSE SERPL-MCNC: 179 MG/DL (ref 65–100)
HCO3 BLDA-SCNC: 34 MMOL/L (ref 22–26)
HCO3 BLDA-SCNC: 36 MMOL/L (ref 22–26)
HCT VFR BLD AUTO: 37.9 % (ref 35–47)
HCT VFR BLD AUTO: 38.4 % (ref 35–47)
HGB BLD-MCNC: 11.5 G/DL (ref 11.5–16)
HGB BLD-MCNC: 11.7 G/DL (ref 11.5–16)
IMM GRANULOCYTES # BLD: 0 K/UL (ref 0–0.04)
IMM GRANULOCYTES NFR BLD AUTO: 0 % (ref 0–0.5)
LYMPHOCYTES # BLD: 0.2 K/UL (ref 0.8–3.5)
LYMPHOCYTES NFR BLD: 2 % (ref 12–49)
MCH RBC QN AUTO: 29 PG (ref 26–34)
MCH RBC QN AUTO: 29.1 PG (ref 26–34)
MCHC RBC AUTO-ENTMCNC: 30.3 G/DL (ref 30–36.5)
MCHC RBC AUTO-ENTMCNC: 30.5 G/DL (ref 30–36.5)
MCV RBC AUTO: 95 FL (ref 80–99)
MCV RBC AUTO: 95.9 FL (ref 80–99)
MONOCYTES # BLD: 0.2 K/UL (ref 0–1)
MONOCYTES NFR BLD: 2 % (ref 5–13)
NEUTS BAND NFR BLD MANUAL: 1 %
NEUTS SEG # BLD: 9.6 K/UL (ref 1.8–8)
NEUTS SEG NFR BLD: 95 % (ref 32–75)
NRBC # BLD: 0 K/UL (ref 0–0.01)
NRBC # BLD: 0 K/UL (ref 0–0.01)
NRBC BLD-RTO: 0 PER 100 WBC
NRBC BLD-RTO: 0 PER 100 WBC
P-R INTERVAL, ECG05: 186 MS
P-R INTERVAL, ECG05: 192 MS
PCO2 BLDA: 59 MMHG (ref 35–45)
PCO2 BLDA: 59 MMHG (ref 35–45)
PH BLDA: 7.38 [PH] (ref 7.35–7.45)
PH BLDA: 7.4 [PH] (ref 7.35–7.45)
PLATELET # BLD AUTO: 181 K/UL (ref 150–400)
PLATELET # BLD AUTO: 193 K/UL (ref 150–400)
PMV BLD AUTO: 11 FL (ref 8.9–12.9)
PMV BLD AUTO: 11.6 FL (ref 8.9–12.9)
PO2 BLDA: 59 MMHG (ref 80–100)
PO2 BLDA: 61 MMHG (ref 80–100)
POTASSIUM SERPL-SCNC: 4.2 MMOL/L (ref 3.5–5.1)
POTASSIUM SERPL-SCNC: 4.6 MMOL/L (ref 3.5–5.1)
Q-T INTERVAL, ECG07: 338 MS
Q-T INTERVAL, ECG07: 350 MS
QRS DURATION, ECG06: 76 MS
QRS DURATION, ECG06: 84 MS
QTC CALCULATION (BEZET), ECG08: 418 MS
QTC CALCULATION (BEZET), ECG08: 440 MS
RBC # BLD AUTO: 3.95 M/UL (ref 3.8–5.2)
RBC # BLD AUTO: 4.04 M/UL (ref 3.8–5.2)
RBC MORPH BLD: ABNORMAL
SAMPLES BEING HELD,HOLD: NORMAL
SAO2 % BLD: 90 % (ref 92–97)
SAO2 % BLD: 91 % (ref 92–97)
SAO2% DEVICE SAO2% SENSOR NAME: ABNORMAL
SAO2% DEVICE SAO2% SENSOR NAME: ABNORMAL
SERVICE CMNT-IMP: ABNORMAL
SODIUM SERPL-SCNC: 135 MMOL/L (ref 136–145)
SODIUM SERPL-SCNC: 139 MMOL/L (ref 136–145)
SPECIMEN SITE: ABNORMAL
SPECIMEN SITE: ABNORMAL
TROPONIN I SERPL-MCNC: <0.05 NG/ML
VENTRICULAR RATE, ECG03: 102 BPM
VENTRICULAR RATE, ECG03: 86 BPM
WBC # BLD AUTO: 10 K/UL (ref 3.6–11)
WBC # BLD AUTO: 8.5 K/UL (ref 3.6–11)

## 2019-01-08 PROCEDURE — 77030029684 HC NEB SM VOL KT MONA -A

## 2019-01-08 PROCEDURE — 96361 HYDRATE IV INFUSION ADD-ON: CPT

## 2019-01-08 PROCEDURE — 96360 HYDRATION IV INFUSION INIT: CPT

## 2019-01-08 PROCEDURE — 99218 HC RM OBSERVATION: CPT

## 2019-01-08 PROCEDURE — 74011636637 HC RX REV CODE- 636/637: Performed by: INTERNAL MEDICINE

## 2019-01-08 PROCEDURE — 94640 AIRWAY INHALATION TREATMENT: CPT

## 2019-01-08 PROCEDURE — 85027 COMPLETE CBC AUTOMATED: CPT

## 2019-01-08 PROCEDURE — 80048 BASIC METABOLIC PNL TOTAL CA: CPT

## 2019-01-08 PROCEDURE — 85025 COMPLETE CBC W/AUTO DIFF WBC: CPT

## 2019-01-08 PROCEDURE — 82803 BLOOD GASES ANY COMBINATION: CPT

## 2019-01-08 PROCEDURE — G0378 HOSPITAL OBSERVATION PER HR: HCPCS

## 2019-01-08 PROCEDURE — 74011636637 HC RX REV CODE- 636/637: Performed by: EMERGENCY MEDICINE

## 2019-01-08 PROCEDURE — 97161 PT EVAL LOW COMPLEX 20 MIN: CPT

## 2019-01-08 PROCEDURE — 83880 ASSAY OF NATRIURETIC PEPTIDE: CPT

## 2019-01-08 PROCEDURE — 74011000250 HC RX REV CODE- 250: Performed by: INTERNAL MEDICINE

## 2019-01-08 PROCEDURE — 84484 ASSAY OF TROPONIN QUANT: CPT

## 2019-01-08 PROCEDURE — 96372 THER/PROPH/DIAG INJ SC/IM: CPT

## 2019-01-08 PROCEDURE — 93005 ELECTROCARDIOGRAM TRACING: CPT

## 2019-01-08 PROCEDURE — 82962 GLUCOSE BLOOD TEST: CPT

## 2019-01-08 PROCEDURE — 77030013140 HC MSK NEB VYRM -A

## 2019-01-08 PROCEDURE — 74011000250 HC RX REV CODE- 250: Performed by: EMERGENCY MEDICINE

## 2019-01-08 PROCEDURE — 77030012879 HC MSK CPAP FLL FAC PHIL -B

## 2019-01-08 PROCEDURE — 74011250636 HC RX REV CODE- 250/636: Performed by: EMERGENCY MEDICINE

## 2019-01-08 PROCEDURE — 74011250636 HC RX REV CODE- 250/636: Performed by: INTERNAL MEDICINE

## 2019-01-08 PROCEDURE — 74011250637 HC RX REV CODE- 250/637: Performed by: INTERNAL MEDICINE

## 2019-01-08 PROCEDURE — 74011636320 HC RX REV CODE- 636/320: Performed by: EMERGENCY MEDICINE

## 2019-01-08 PROCEDURE — 97530 THERAPEUTIC ACTIVITIES: CPT

## 2019-01-08 PROCEDURE — 36415 COLL VENOUS BLD VENIPUNCTURE: CPT

## 2019-01-08 PROCEDURE — 36600 WITHDRAWAL OF ARTERIAL BLOOD: CPT

## 2019-01-08 RX ORDER — PRAVASTATIN SODIUM 10 MG/1
40 TABLET ORAL
Status: DISCONTINUED | OUTPATIENT
Start: 2019-01-08 | End: 2019-01-10 | Stop reason: HOSPADM

## 2019-01-08 RX ORDER — LISINOPRIL 20 MG/1
40 TABLET ORAL DAILY
Status: DISCONTINUED | OUTPATIENT
Start: 2019-01-08 | End: 2019-01-08

## 2019-01-08 RX ORDER — MAGNESIUM SULFATE 100 %
4 CRYSTALS MISCELLANEOUS AS NEEDED
Status: DISCONTINUED | OUTPATIENT
Start: 2019-01-08 | End: 2019-01-10 | Stop reason: HOSPADM

## 2019-01-08 RX ORDER — INSULIN GLARGINE 100 [IU]/ML
40 INJECTION, SOLUTION SUBCUTANEOUS DAILY
Status: DISCONTINUED | OUTPATIENT
Start: 2019-01-08 | End: 2019-01-10 | Stop reason: HOSPADM

## 2019-01-08 RX ORDER — ACETAMINOPHEN 325 MG/1
650 TABLET ORAL
Status: DISCONTINUED | OUTPATIENT
Start: 2019-01-08 | End: 2019-01-10 | Stop reason: HOSPADM

## 2019-01-08 RX ORDER — IPRATROPIUM BROMIDE AND ALBUTEROL SULFATE 2.5; .5 MG/3ML; MG/3ML
3 SOLUTION RESPIRATORY (INHALATION) 2 TIMES DAILY
Status: ON HOLD | COMMUNITY
End: 2019-01-10 | Stop reason: SDUPTHER

## 2019-01-08 RX ORDER — PREDNISONE 20 MG/1
20 TABLET ORAL 2 TIMES DAILY WITH MEALS
Status: DISCONTINUED | OUTPATIENT
Start: 2019-01-08 | End: 2019-01-09

## 2019-01-08 RX ORDER — CYCLOBENZAPRINE HCL 10 MG
5 TABLET ORAL
Status: DISCONTINUED | OUTPATIENT
Start: 2019-01-08 | End: 2019-01-10 | Stop reason: HOSPADM

## 2019-01-08 RX ORDER — LORATADINE 10 MG/1
10 TABLET ORAL DAILY
Status: DISCONTINUED | OUTPATIENT
Start: 2019-01-08 | End: 2019-01-10 | Stop reason: HOSPADM

## 2019-01-08 RX ORDER — GUAIFENESIN 100 MG/5ML
81 LIQUID (ML) ORAL DAILY
Status: DISCONTINUED | OUTPATIENT
Start: 2019-01-08 | End: 2019-01-10 | Stop reason: HOSPADM

## 2019-01-08 RX ORDER — IPRATROPIUM BROMIDE AND ALBUTEROL SULFATE 2.5; .5 MG/3ML; MG/3ML
3 SOLUTION RESPIRATORY (INHALATION) ONCE
Status: COMPLETED | OUTPATIENT
Start: 2019-01-08 | End: 2019-01-08

## 2019-01-08 RX ORDER — SODIUM CHLORIDE 0.9 % (FLUSH) 0.9 %
5-40 SYRINGE (ML) INJECTION AS NEEDED
Status: DISCONTINUED | OUTPATIENT
Start: 2019-01-08 | End: 2019-01-10 | Stop reason: HOSPADM

## 2019-01-08 RX ORDER — DEXTROSE 50 % IN WATER (D50W) INTRAVENOUS SYRINGE
25-50 AS NEEDED
Status: DISCONTINUED | OUTPATIENT
Start: 2019-01-08 | End: 2019-01-10 | Stop reason: HOSPADM

## 2019-01-08 RX ORDER — NYSTATIN 100000 [USP'U]/G
POWDER TOPICAL 2 TIMES DAILY
COMMUNITY
End: 2019-09-16

## 2019-01-08 RX ORDER — FLUTICASONE FUROATE AND VILANTEROL 200; 25 UG/1; UG/1
1 POWDER RESPIRATORY (INHALATION) DAILY
Status: DISCONTINUED | OUTPATIENT
Start: 2019-01-08 | End: 2019-01-10 | Stop reason: HOSPADM

## 2019-01-08 RX ORDER — HYDROXYZINE 50 MG/ML
50 INJECTION, SOLUTION INTRAMUSCULAR
Status: DISCONTINUED | OUTPATIENT
Start: 2019-01-08 | End: 2019-01-08

## 2019-01-08 RX ORDER — SODIUM CHLORIDE 0.9 % (FLUSH) 0.9 %
5-40 SYRINGE (ML) INJECTION EVERY 8 HOURS
Status: DISCONTINUED | OUTPATIENT
Start: 2019-01-08 | End: 2019-01-10 | Stop reason: HOSPADM

## 2019-01-08 RX ORDER — IPRATROPIUM BROMIDE AND ALBUTEROL SULFATE 2.5; .5 MG/3ML; MG/3ML
3 SOLUTION RESPIRATORY (INHALATION)
Status: DISCONTINUED | OUTPATIENT
Start: 2019-01-08 | End: 2019-01-10 | Stop reason: HOSPADM

## 2019-01-08 RX ORDER — OXYCODONE HYDROCHLORIDE 5 MG/1
5 TABLET ORAL
COMMUNITY
End: 2019-01-10

## 2019-01-08 RX ORDER — FUROSEMIDE 40 MG/1
40 TABLET ORAL
Status: DISCONTINUED | OUTPATIENT
Start: 2019-01-09 | End: 2019-01-10 | Stop reason: HOSPADM

## 2019-01-08 RX ORDER — IPRATROPIUM BROMIDE AND ALBUTEROL SULFATE 2.5; .5 MG/3ML; MG/3ML
3 SOLUTION RESPIRATORY (INHALATION)
Status: DISCONTINUED | OUTPATIENT
Start: 2019-01-08 | End: 2019-01-09

## 2019-01-08 RX ORDER — ONDANSETRON 2 MG/ML
4 INJECTION INTRAMUSCULAR; INTRAVENOUS
Status: DISCONTINUED | OUTPATIENT
Start: 2019-01-08 | End: 2019-01-10 | Stop reason: HOSPADM

## 2019-01-08 RX ORDER — INSULIN LISPRO 100 [IU]/ML
INJECTION, SOLUTION INTRAVENOUS; SUBCUTANEOUS
Status: DISCONTINUED | OUTPATIENT
Start: 2019-01-08 | End: 2019-01-10 | Stop reason: HOSPADM

## 2019-01-08 RX ORDER — HYDROXYZINE 25 MG/1
50 TABLET, FILM COATED ORAL
Status: DISCONTINUED | OUTPATIENT
Start: 2019-01-08 | End: 2019-01-10 | Stop reason: HOSPADM

## 2019-01-08 RX ORDER — METOPROLOL TARTRATE 25 MG/1
25 TABLET, FILM COATED ORAL 2 TIMES DAILY
Status: DISCONTINUED | OUTPATIENT
Start: 2019-01-08 | End: 2019-01-10 | Stop reason: HOSPADM

## 2019-01-08 RX ORDER — HYDROXYZINE PAMOATE 25 MG/1
50 CAPSULE ORAL
Status: DISCONTINUED | OUTPATIENT
Start: 2019-01-08 | End: 2019-01-08

## 2019-01-08 RX ORDER — FUROSEMIDE 40 MG/1
40 TABLET ORAL 2 TIMES DAILY
COMMUNITY

## 2019-01-08 RX ORDER — NYSTATIN 100000 U/G
CREAM TOPICAL 2 TIMES DAILY
Status: ON HOLD | COMMUNITY
End: 2019-09-16 | Stop reason: SDUPTHER

## 2019-01-08 RX ORDER — ENOXAPARIN SODIUM 100 MG/ML
40 INJECTION SUBCUTANEOUS EVERY 12 HOURS
Status: DISCONTINUED | OUTPATIENT
Start: 2019-01-08 | End: 2019-01-10 | Stop reason: HOSPADM

## 2019-01-08 RX ORDER — PREDNISONE 20 MG/1
60 TABLET ORAL
Status: COMPLETED | OUTPATIENT
Start: 2019-01-08 | End: 2019-01-08

## 2019-01-08 RX ORDER — NITROGLYCERIN 0.4 MG/1
0.4 TABLET SUBLINGUAL
Status: DISCONTINUED | OUTPATIENT
Start: 2019-01-08 | End: 2019-01-10 | Stop reason: HOSPADM

## 2019-01-08 RX ORDER — NAPROXEN SODIUM 220 MG
440 TABLET ORAL
COMMUNITY
End: 2019-03-17

## 2019-01-08 RX ORDER — CYCLOBENZAPRINE HCL 10 MG
5 TABLET ORAL
Status: DISCONTINUED | OUTPATIENT
Start: 2019-01-08 | End: 2019-01-08

## 2019-01-08 RX ORDER — ALBUTEROL SULFATE 90 UG/1
2 AEROSOL, METERED RESPIRATORY (INHALATION)
Status: DISCONTINUED | OUTPATIENT
Start: 2019-01-08 | End: 2019-01-10 | Stop reason: HOSPADM

## 2019-01-08 RX ADMIN — INSULIN LISPRO 7 UNITS: 100 INJECTION, SOLUTION INTRAVENOUS; SUBCUTANEOUS at 17:27

## 2019-01-08 RX ADMIN — LISINOPRIL 40 MG: 20 TABLET ORAL at 08:54

## 2019-01-08 RX ADMIN — SODIUM CHLORIDE 50 ML/HR: 900 INJECTION, SOLUTION INTRAVENOUS at 01:26

## 2019-01-08 RX ADMIN — PRAVASTATIN SODIUM 40 MG: 10 TABLET ORAL at 21:34

## 2019-01-08 RX ADMIN — INSULIN LISPRO 2 UNITS: 100 INJECTION, SOLUTION INTRAVENOUS; SUBCUTANEOUS at 22:00

## 2019-01-08 RX ADMIN — IPRATROPIUM BROMIDE AND ALBUTEROL SULFATE 3 ML: .5; 3 SOLUTION RESPIRATORY (INHALATION) at 21:53

## 2019-01-08 RX ADMIN — ASPIRIN 81 MG 81 MG: 81 TABLET ORAL at 08:54

## 2019-01-08 RX ADMIN — PREDNISONE 20 MG: 20 TABLET ORAL at 08:01

## 2019-01-08 RX ADMIN — FUROSEMIDE 60 MG: 40 TABLET ORAL at 20:07

## 2019-01-08 RX ADMIN — METOPROLOL TARTRATE 25 MG: 25 TABLET ORAL at 08:55

## 2019-01-08 RX ADMIN — INSULIN LISPRO 2 UNITS: 100 INJECTION, SOLUTION INTRAVENOUS; SUBCUTANEOUS at 08:01

## 2019-01-08 RX ADMIN — IPRATROPIUM BROMIDE AND ALBUTEROL SULFATE 3 ML: .5; 3 SOLUTION RESPIRATORY (INHALATION) at 16:43

## 2019-01-08 RX ADMIN — ENOXAPARIN SODIUM 40 MG: 40 INJECTION SUBCUTANEOUS at 08:54

## 2019-01-08 RX ADMIN — Medication 10 ML: at 01:26

## 2019-01-08 RX ADMIN — Medication 10 ML: at 08:56

## 2019-01-08 RX ADMIN — CYCLOBENZAPRINE HYDROCHLORIDE 5 MG: 10 TABLET, FILM COATED ORAL at 08:55

## 2019-01-08 RX ADMIN — Medication 10 ML: at 21:34

## 2019-01-08 RX ADMIN — LORATADINE 10 MG: 10 TABLET ORAL at 08:55

## 2019-01-08 RX ADMIN — PREDNISONE 60 MG: 20 TABLET ORAL at 03:23

## 2019-01-08 RX ADMIN — IPRATROPIUM BROMIDE AND ALBUTEROL SULFATE 3 ML: .5; 3 SOLUTION RESPIRATORY (INHALATION) at 03:23

## 2019-01-08 RX ADMIN — FUROSEMIDE 60 MG: 40 TABLET ORAL at 08:55

## 2019-01-08 RX ADMIN — PREDNISONE 20 MG: 20 TABLET ORAL at 16:43

## 2019-01-08 RX ADMIN — ENOXAPARIN SODIUM 40 MG: 40 INJECTION SUBCUTANEOUS at 21:34

## 2019-01-08 RX ADMIN — IPRATROPIUM BROMIDE AND ALBUTEROL SULFATE 3 ML: .5; 3 SOLUTION RESPIRATORY (INHALATION) at 12:13

## 2019-01-08 RX ADMIN — METOPROLOL TARTRATE 25 MG: 25 TABLET ORAL at 17:27

## 2019-01-08 RX ADMIN — IPRATROPIUM BROMIDE AND ALBUTEROL SULFATE 3 ML: .5; 3 SOLUTION RESPIRATORY (INHALATION) at 08:01

## 2019-01-08 RX ADMIN — INSULIN LISPRO 5 UNITS: 100 INJECTION, SOLUTION INTRAVENOUS; SUBCUTANEOUS at 12:00

## 2019-01-08 RX ADMIN — IOPAMIDOL 100 ML: 755 INJECTION, SOLUTION INTRAVENOUS at 01:26

## 2019-01-08 RX ADMIN — FLUTICASONE FUROATE AND VILANTEROL TRIFENATATE 1 PUFF: 200; 25 POWDER RESPIRATORY (INHALATION) at 11:28

## 2019-01-08 RX ADMIN — INSULIN GLARGINE 40 UNITS: 100 INJECTION, SOLUTION SUBCUTANEOUS at 10:17

## 2019-01-08 NOTE — ED NOTES
Bedside shift change report given to [facility], RN  (oncoming nurse) by Dayan Caballero RN  (offgoing nurse). Report included the following information SBAR, ED Summary, MAR and Recent Results.

## 2019-01-08 NOTE — PROGRESS NOTES
physical Therapy EVALUATION/DISCHARGE Patient: Rodrigo Hutchins (6 y.o. female) Date: 1/8/2019 Primary Diagnosis: Respiratory failure ([address] Precautions: O2 6L at baseline ASSESSMENT : 
Based on the objective data described below, the patient presents with limitations to activity tolerance refusing to increase her O2 to baseline level. Otherwise baseline function. Cleared for session by RN. Pt states she lives at home alone but has family help and caregivers that come for 6 hrs each day to assist with ADLs and IADLS. She states she walks w/o device independently in the home for distances of ~20' at a time and has assist to enter/exit. She has a rollator but states no device fits in her home. She states she sponge bathes. Appears to be at her baseline mobility status. Required only S due to respiratory monitoring during mobility. She is physically able to come to EOB and transfer and amb w/o assist. Pt refused to RN to have her O2 above 4.5L. Pt amb ~12' with S only, no device, and sats dropped to 83% and then with somewhat unreliable reading into 70s. Encouraged pt to allow O2 to be increased to her 6L baseline but she would only agree to 5L and then demanded it be turned back to 4.5L because she stated the higher O2 made her chest burn. Left pt at EOB seated. Pt states she wished to remain there currently and would put herself back into the bed. Nurse present post amb and aware of pt at EOB. At this time, pt is mainly limited by her respiratory status with non-compliance to appropriate O2 levels for support during activity. She states her max distance at home for amb is usually about 20'. As far as physical mobility, pt is at her baseline. No further skilled PT needs in acute setting. Pt can safely mobilize with S of nursing as tolerated. Further skilled acute physical therapy is not indicated at this time. PLAN : 
Discharge Recommendations: None Further Equipment Recommendations for Discharge: None at this time SUBJECTIVE:  
Patient stated It makes my chest burn. Turn it down.  OBJECTIVE DATA SUMMARY:  
HISTORY:   
Past Medical History:  
Diagnosis Date  Arthritis  Asthma  Breast lump  CAD (coronary artery disease)  Congestive heart failure ([facility]  COPD (chronic obstructive pulmonary disease) ([facility])  Diabetes ([facility]  Hypertension  Morbid obesity with BMI of 70 and over, adult ([facility]  NSTEMI (non-ST elevated myocardial infarction) ([facility]  SVT (supraventricular tachycardia) ([facility]) Past Surgical History:  
Procedure Laterality Date  CARDIAC SURG PROCEDURE UNLIST Stents  HX  SECTION    
 HX ORTHOPAEDIC    
 HX OTHER SURGICAL    
 cyst removed from back Prior Level of Function/Home Situation: Pt states she lives at home alone but has family help and caregivers that come for 6 hrs each day to assist with ADLs and IADLS. She states she walks w/o device independently in the home for distances of ~20' at a time and has assist to enter/exit. She has a rollator but states no device fits in her home. She states she sponge bathes. Home Situation Home Environment: (P) Private residence # Steps to Enter: (P) 5 Rails to Enter: (P) Yes Hand Rails : (P) Bilateral 
One/Two Story Residence: (P) One story Living Alone: (P) Yes Support Systems: (P) Home care staff, Family member(s) Patient Expects to be Discharged to[de-identified] (P) Private residence Current DME Used/Available at Home: (P) Commode, bedside, Walker, rollator, Wheelchair(elec scooter) EXAMINATION/PRESENTATION/DECISION MAKING:  
Critical Behavior: 
Neurologic State: (P) Alert Orientation Level: (P) Oriented X4 Cognition: (P) Follows commands Hearing: Auditory Auditory Impairment: None Range Of Motion: 
AROM: Generally decreased, functional 
  
  
  
PROM: Generally decreased, functional 
  
  
  
Strength:   
 Strength: Generally decreased, functional 
  
  
  
  
  
  
Tone & Sensation:  
  
  
  
  
  
Sensation: Impaired(LEs with wounds and dressing) Coordination: 
Coordination: Within functional limits Functional Mobility: 
Bed Mobility: 
Rolling: Independent Supine to Sit: Independent Transfers: 
Sit to Stand: Supervision Stand to Sit: Supervision Balance:  
Sitting: Intact Standing: Intact Ambulation/Gait Training: 
Distance (ft): 12 Feet (ft) Assistive Device: (none) Ambulation - Level of Assistance: Supervision Gait Abnormalities: Decreased step clearance;Trunk sway increased(increased lateral wgt shift d/t obesity) Base of Support: Widened Speed/Danay: Slow Step Length: Right shortened;Left shortened Functional Measure: 
Barthel Index: 
 
Bathin Bladder: 10 Bowels: 10 
Groomin Dressin Feeding: 10 Mobility: 0(limited by distance) Stairs: 5 Toilet Use: 10 Transfer (Bed to Chair and Back): 10 Total: 70 The Barthel ADL Index: Guidelines 1. The index should be used as a record of what a patient does, not as a record of what a patient could do. 2. The main aim is to establish degree of independence from any help, physical or verbal, however minor and for whatever reason. 3. The need for supervision renders the patient not independent. 4. A patient's performance should be established using the best available evidence. Asking the patient, friends/relatives and nurses are the usual sources, but direct observation and common sense are also important. However direct testing is not needed. 5. Usually the patient's performance over the preceding 24-48 hours is important, but occasionally longer periods will be relevant. 6. Middle categories imply that the patient supplies over 50 per cent of the effort. 7. Use of aids to be independent is allowed. Etienne Gilmore., Barthel, D.W. ([id number]). Functional evaluation: the Barthel Index. [address] (14)2. PEACE Lindsey, Clarence Morrison [place], [address] (1999). Measuring the change indisability after inpatient rehabilitation; comparison of the responsiveness of the Barthel Index and Functional [place] Measure. Journal of Neurology, Neurosurgery, and Psychiatry, 66(4), 937-670. KRIS Brice, AGUSTO Zarco, & Clark Sierra M.A. (2004.) Assessment of post-stroke quality of life in cost-effectiveness studies: The usefulness of the Barthel Index and the EuroQoL-5D. [facility], 13, 822-33 Physical Therapy Evaluation Charge Determination History Examination Presentation Decision-Making HIGH Complexity :3+ comorbidities / personal factors will impact the outcome/ POC  MEDIUM Complexity : 3 Standardized tests and measures addressing body structure, function, activity limitation and / or participation in recreation  MEDIUM Complexity : Evolving with changing characteristics  LOW Complexity : FOTO score of  Based on the above components, the patient evaluation is determined to be of the following complexity level: LOW Activity Tolerance:  
See above; nurse aware Please refer to the flowsheet for vital signs taken during this treatment. After treatment:  
[]   Patient left in no apparent distress sitting up in chair 
[x]   Patient left in no apparent distress in bed 
[x]   Call bell left within reach [x]   Nursing notified 
[x]   Caregiver present 
[]   Bed alarm activated COMMUNICATION/EDUCATION:  
Communication/Collaboration: 
[x]   Fall prevention education was provided and the patient/caregiver indicated understanding. [x]   Patient/family have participated as able and agree with findings and recommendations. []   Patient is unable to participate in plan of care at this time. Findings and recommendations were discussed with: Registered Nurse and  Thank you for this referral. 
Kelsy Cates, PT Time Calculation: 20 mins

## 2019-01-08 NOTE — H&P
Hospitalist Admission NoteNAME: Page Gabriel :  1970 MRN:  [id number] Date/Time:  2019 6:57 AM 
 
Patient PCP: Flaco Jackson NP 
______________________________________________________________________ Given the patient's current clinical presentation, I have a high level of concern for decompensation if discharged from the emergency department. Complex decision making was performed, which includes reviewing the patient's available past medical records, laboratory results, and x-ray films. My assessment of this patient's clinical condition and my plan of care is as follows. Assessment / Plan: 
Acute on chronic hypoxemic hypercapnic respiratory failure secondary to obesity hypoventilation syndrome superimposed COPD Admit patient to telemetry for observation Continue oral prednisone DuoNeb nebulizer [facility] manager consultation patient will need CPAP mask before discharge  discharged Chest pain Check EKG Follow-up serial troponin DM Hold oral hypoglycemic medicationstart patient on scheduled insulin Hypertension Continue home antihypertensive medication Morbid obesity CHF diastolic acute on chronic Continue Lasix Follow-up echocardiogram 
 
 
 
Code Status: full Surrogate Decision Maker:SON  
 
DVT Prophylaxis: Lovenox GI Prophylaxis: not indicated Subjective: CHIEF COMPLAINT: SOB and chest pain HISTORY OF PRESENT ILLNESS:    
57years old female from home with past medical history significant for asthma, CHF, morbid obesity, coronary artery disease, SVT, DM presented to the hospital today complaining from worsening shortness of breath associated with chest pain started today patient was recently discharged from [facility] 2 days ago and was admitted for same complaint, patient stated her baseline she is on 6 L nasal cannula at home that she cannot tolerate 6 L because she feels like it burning into CTA chest was done and show No PE . We were asked to admit for work up and evaluation of the above problems. Past Medical History:  
Diagnosis Date  Arthritis  Asthma  Breast lump  CAD (coronary artery disease)  Congestive heart failure ([facility]  COPD (chronic obstructive pulmonary disease) ([facility])  Diabetes ([facility]  Hypertension  Morbid obesity with BMI of 70 and over, adult ([facility]  NSTEMI (non-ST elevated myocardial infarction) ([facility]  SVT (supraventricular tachycardia) ([facility]) Past Surgical History:  
Procedure Laterality Date  CARDIAC SURG PROCEDURE UNLIST Stents  HX  SECTION    
 HX ORTHOPAEDIC    
 HX OTHER SURGICAL    
 cyst removed from back Social History Tobacco Use  Smoking status: Former Smoker Packs/day: 0.25 Types: Cigarettes  Smokeless tobacco: Never Used  Tobacco comment: Patient states \"I  aint smoking no more d*mn cigarettes after yesterday\" 2018 Substance Use Topics  Alcohol use: No  
  
 
Family History Problem Relation Age of Onset  Heart Disease Mother  Heart Disease Father  Heart Disease Sister  Breast Cancer Maternal Grandmother  Breast Cancer Paternal Grandmother No Known Allergies Prior to Admission medications Medication Sig Start Date End Date Taking? Authorizing Provider  
fluticasone (FLONASE) 50 mcg/actuation nasal spray 2 Sprays by Both Nostrils route daily. 19   Ferdie Klinefelter, MD  
predniSONE (DELTASONE) 10 mg tablet 3 tablets daily for 3 days, then 2 tablets daily for 3 days, then 1 tablet daily for 3 days. 19   Ferdie Klinefelter, MD  
albuterol-ipratropium (DUO-NEB) 2.5 mg-0.5 mg/3 ml nebu 3 mL by Nebulization route every four (4) hours as needed.  19   Ferdie Klinefelter, MD  
Nebulizer & Compressor machine Use as needed 19   Ferdie Klinefelter, MD  
 furosemide (LASIX) 40 mg tablet Take 1.5 Tabs by mouth two (2) times a day. 1/6/19   Carlie Duran MD  
insulin glargine (LANTUS) 100 unit/mL injection 40 Units by SubCUTAneous route daily. Provider, Historical  
metoprolol tartrate (LOPRESSOR) 25 mg tablet Take 25 mg by mouth two (2) times a day. Provider, Historical  
lisinopril (PRINIVIL, ZESTRIL) 40 mg tablet Take 40 mg by mouth daily. Provider, Historical  
medroxyPROGESTERone (DEPO-PROVERA) 150 mg/mL injection 150 mg by IntraMUSCular route every three (3) months. Provider, Historical  
fluticasone-salmeterol (ADVAIR DISKUS) 500-50 mcg/dose diskus inhaler Take 1 Puff by inhalation every twelve (12) hours. Provider, Historical  
loratadine (CLARITIN) 10 mg tablet Take 10 mg by mouth daily. Provider, Historical  
albuterol (VENTOLIN HFA) 90 mcg/actuation inhaler Take 2 Puffs by inhalation every six (6) hours as needed for Wheezing. Provider, Historical  
cyclobenzaprine (FLEXERIL) 5 mg tablet Take 5 mg by mouth three (3) times daily as needed. flexeriol     Provider, Historical  
aspirin 81 mg chewable tablet Take 81 mg by mouth daily. Provider, Historical  
hydrOXYzine pamoate (VISTARIL) 50 mg capsule Take 50 mg by mouth every eight (8) hours as needed for Itching. Indications: Pruritus of Skin    Provider, Historical  
lovastatin (MEVACOR) 40 mg tablet Take 1 Tab by mouth nightly. 1/14/16   Lachelle SIMPSON NP  
glyBURIDE (DIABETA) 5 mg tablet Take 5 mg by mouth Daily (before breakfast). Provider, Historical  
ammonium lactate (LAC-HYDRIN) 12 % topical cream rub in to affected area well 11/21/14   Peewee Song MD  
nitroglycerin (NITROSTAT) 0.4 mg SL tablet 1 Tab by SubLINGual route every five (5) minutes as needed for Chest Pain (call 911 if not relieved by 3). 9/12/13   Abby Caputo NP  
 
 
REVIEW OF SYSTEMS:    
I am not able to complete the review of systems because: The patient is intubated and sedated The patient has altered mental status due to his acute medical problems The patient has baseline aphasia from prior stroke(s) The patient has baseline dementia and is not reliable historian The patient is in acute medical distress and unable to provide information Total of 12 systems reviewed as follows:   
   POSITIVE= underlined text  Negative = text not underlined General:  fever, chills, sweats, generalized weakness, weight loss/gain,  
   loss of appetite Eyes:    blurred vision, eye pain, loss of vision, double vision ENT:    rhinorrhea, pharyngitis Respiratory:   cough, sputum production, SOB, RAZO, wheezing, pleuritic pain  
Cardiology:   chest pain, palpitations, orthopnea, PND, edema, syncope Gastrointestinal:  abdominal pain , N/V, diarrhea, dysphagia, constipation, bleeding Genitourinary:  frequency, urgency, dysuria, hematuria, incontinence Muskuloskeletal :  arthralgia, myalgia, back pain Hematology:  easy bruising, nose or gum bleeding, lymphadenopathy Dermatological: rash, ulceration, pruritis, color change / jaundice Endocrine:   hot flashes or polydipsia Neurological:  headache, dizziness, confusion, focal weakness, paresthesia, Speech difficulties, memory loss, gait difficulty Psychological: Feelings of anxiety, depression, agitation Objective: VITALS:   
Visit Vitals /85 Pulse 94 Temp 97.4 °F (36.3 °C) Resp 18 Ht 5' 6\" (1.676 m) Wt (!) 189.1 kg (417 lb) SpO2 (!) 89% BMI 67.31 kg/m² PHYSICAL EXAM: 
 
General:    Alert, cooperative, no distress, appears stated age. HEENT: Atraumatic, anicteric sclerae, pink conjunctivae No oral ulcers, mucosa moist, throat clear, dentition fair Neck:  Supple, symmetrical,  thyroid: non tender Lungs:   diminished breath sound b/l Chest wall:  No tenderness  No Accessory muscle use. Heart:   Regular  rhythm,  No  murmur   No edema Abdomen:   Soft, non-tender. Not distended. Bowel sounds normal 
Extremities: No cyanosis. No clubbing,   
  Skin turgor normal, Capillary refill normal, Radial dial pulse 2+ Skin:     Not pale. Not Jaundiced  No rashes Psych:  Good insight. Not depressed. Not anxious or agitated. Neurologic: EOMs intact. No facial asymmetry. No aphasia or slurred speech. Symmetrical strength, Sensation grossly intact. Alert and oriented X 4.  
 
_______________________________________________________________________ Care Plan discussed with: 
  Comments Patient y Family RN y   
Care Manager Consultant:     
_______________________________________________________________________ Expected  Disposition:  
Home with Family y HH/PT/OT/RN   
SNF/LTC   
LUIS A   
________________________________________________________________________ TOTAL TIME:  40  Minutes Critical Care Provided     Minutes non procedure based Comments  
 y Reviewed previous records  
>50% of visit spent in counseling and coordination of care y Discussion with patient and/or family and questions answered 
  
 
________________________________________________________________________ Signed: Marcelo Dahl MD 
 
Procedures: see electronic medical records for all procedures/Xrays and details which were not copied into this note but were reviewed prior to creation of Plan. LAB DATA REVIEWED:   
Recent Results (from the past 24 hour(s)) EKG, 12 LEAD, INITIAL Collection Time: 01/07/19 11:28 PM  
Result Value Ref Range Ventricular Rate 86 BPM  
 Atrial Rate 86 BPM  
 P-R Interval 186 ms QRS Duration 76 ms  
 Q-T Interval 350 ms QTC Calculation (Bezet) 418 ms Calculated P Axis 58 degrees Calculated R Axis 73 degrees Calculated T Axis 56 degrees Diagnosis Normal sinus rhythm Low voltage QRS When compared with ECG of 03-JAN-2019 01:24, No significant change was found NT-PRO BNP  
 Collection Time: 01/08/19 12:27 AM  
Result Value Ref Range NT pro- (H) 0 - 125 PG/ML  
TROPONIN I Collection Time: 01/08/19 12:27 AM  
Result Value Ref Range Troponin-I, Qt. <0.05 <0.05 ng/mL SAMPLES BEING HELD Collection Time: 01/08/19 12:27 AM  
Result Value Ref Range SAMPLES BEING HELD LV   
 COMMENT Add-on orders for these samples will be processed based on acceptable specimen integrity and analyte stability, which may vary by analyte. CBC W/O DIFF Collection Time: 01/08/19 12:30 AM  
Result Value Ref Range WBC 8.5 3.6 - 11.0 K/uL  
 RBC 3.95 3.80 - 5.20 M/uL  
 HGB 11.5 11.5 - 16.0 g/dL HCT 37.9 35.0 - 47.0 % MCV 95.9 80.0 - 99.0 FL  
 MCH 29.1 26.0 - 34.0 PG  
 MCHC 30.3 30.0 - 36.5 g/dL  
 RDW 13.1 11.5 - 14.5 % PLATELET 300 544 - 934 K/uL MPV 11.6 8.9 - 12.9 FL  
 NRBC 0.0 0  WBC ABSOLUTE NRBC 0.00 0.00 - 0.01 K/uL METABOLIC PANEL, BASIC Collection Time: 01/08/19 12:30 AM  
Result Value Ref Range Sodium 139 136 - 145 mmol/L Potassium 4.2 3.5 - 5.1 mmol/L Chloride 99 97 - 108 mmol/L  
 CO2 30 21 - 32 mmol/L Anion gap 10 5 - 15 mmol/L Glucose 167 (H) 65 - 100 mg/dL BUN 14 6 - 20 MG/DL Creatinine 0.73 0.55 - 1.02 MG/DL  
 BUN/Creatinine ratio 19 12 - 20 GFR est AA >60 >60 ml/min/1.73m2 GFR est non-AA >60 >60 ml/min/1.73m2 Calcium 10.3 (H) 8.5 - 10.1 MG/DL  
BLOOD GAS, ARTERIAL Collection Time: 01/08/19  2:12 AM  
Result Value Ref Range pH 7.38 7.35 - 7.45    
 PCO2 59 (H) 35.0 - 45.0 mmHg PO2 59 (L) 80 - 100 mmHg O2 SAT 90 (L) 92 - 97 % BICARBONATE 34 (H) 22 - 26 mmol/L  
 BASE EXCESS 7.0 mmol/L  
 O2 METHOD NASAL O2    
 O2 FLOW RATE 4.00 L/min SPONTANEOUS RATE 22.0 Sample source ARTERIAL    
 SITE RIGHT RADIAL PARUL'S TEST YES    
BLOOD GAS, ARTERIAL Collection Time: 01/08/19  4:51 AM  
Result Value Ref Range pH 7.40 7.35 - 7.45    
 PCO2 59 (H) 35.0 - 45.0 mmHg PO2 61 (L) 80 - 100 mmHg O2 SAT 91 (L) 92 - 97 % BICARBONATE 36 (H) 22 - 26 mmol/L  
 BASE EXCESS 8.5 mmol/L  
 O2 METHOD NASAL O2    
 O2 FLOW RATE 3.5 L/min SPONTANEOUS RATE 20 Sample source ARTERIAL    
 SITE RIGHT RADIAL  PARUL'S TEST YES

## 2019-01-08 NOTE — ED TRIAGE NOTES
Patient states that she was discharged from [facility] yesterday for COPD. Patient states that she has been having difficulty breathing and getting SOB - and states that wanted to come get checked out

## 2019-01-08 NOTE — ED NOTES
Physical therapy at bedside to attempt to work with pt. Will give albuterol treatment once completed.

## 2019-01-08 NOTE — PROGRESS NOTES
1705 
Attempted to call ED for report. No answer on pod phone or zone phone. Will attempt to call again shortly. TRANSFER - IN REPORT: 
 
Verbal report received from Renetta(name) on Yahaira Hobson  being received from ED(unit) for routine progression of care Report consisted of patients Situation, Background, Assessment and  
Recommendations(SBAR). Information from the following report(s) SBAR, Kardex, ED Summary, Procedure Summary, Intake/Output, MAR, Accordion, Recent Results, Med Rec Status and Cardiac Rhythm SR to ST was reviewed with the receiving nurse. Opportunity for questions and clarification was provided. Assessment completed upon patients arrival to unit and care assumed. [address] [facility] Primary Nurse Abimael Gomez and Maik Brock RN performed a dual skin assessment on this patient Impairment noted- see wound doc flow sheet   Possible DTI on R heel, weeping pink raw wound wrapped with xeroform/abd pad and guaze, excoriation/yeast under abdominal folds and bilateral breasts. Jeffrey score is 17 Patient requesting bedside commode and large recliner. Unit does not have one available and housekeeping does not have any available. Patient wants to wait to take lasix until [facility] available. Patient ended up ambulating to the restroom. Experienced extreme RAZO.

## 2019-01-08 NOTE — ED NOTES
Called respiratory at this time - will come to do ABG Duration Of Freeze Thaw-Cycle (Seconds): 0 Post-Care Instructions: After care instructions were reviewed with the patient & the handout was given to the patient. Consent: Risks & side effects were explained including redness, blistering, hypopigmentation, hyperpigmentation, scar, infection, & recurrence. Post-procedure instruction hand-out was given to the patient. Render Post-Care Instructions In Note?: no Detail Level: Simple

## 2019-01-08 NOTE — TELEPHONE ENCOUNTER
Spoke with [facility]. Patient had changed her mind about going with [facility] [employer] [facility] and stayed with [facility]. Patient had stated she was no longer seen by Adrien Aj NP. Spoke with [facility] [phone] - Patient's provider is now Jessica Rob NP through [facility].

## 2019-01-08 NOTE — PROGRESS NOTES
Pt has been accepted by [facility] for trilogy machine. CM sent referral via [employer]. [employer] will be available to switch machines tomorrow, 1/9/19. Anticipate discharge for tomorrow, 1/9/19. CM to send resumption of care orders to [facility] for resumption of care. CM to arrange pt a follow up appointment with Sleep Study Doctor, Dr. Opal Norman. CM informed Dr. Allan Vieyra of above. No further questions or needs identified at this time. CM will continue to remain available for support, discharge planning as needed. 14:30 CM attempted to make follow up appointment with Dr. Opal Norman. CM left a voicemail requesting a return call for appointment with direct contact information given. CM will continue to remain available for support, discharge planning as needed. 15:09 CM made a follow up appointment for pt to see Dr. Opal Norman on January 16, 2019 at 2:30 p.m. AVS updated with information. CM sent referral to [facility] for resumption of care for home health needs. CM informed [facility] of potential discharge for tomorrow, 1/9/19, via [employer]. CM will continue to remain available for support, discharge planning as needed. Yemi Lr, MSW, LSW Supervisee in Social Work [facility] [phone]

## 2019-01-08 NOTE — ED NOTES
Pt called nutrition services regarding meal tray this am. Pt provided with hosptial bed and 2 pillows per request. Pt refusing to leave BP cuff on at this time. [id number], 2 units of subcu insulin given.

## 2019-01-08 NOTE — ED NOTES
Dr Mims Class at bedside. Pt resting on stretcher watching tv. No complaints at this time. Pt reconnected to monitor and o2 monitor.

## 2019-01-08 NOTE — PROGRESS NOTES
Pharmacy Clarification of the Prior to Admission Medication Regimen Retrospective to the Admission Medication Reconciliation The patient was interviewed regarding clarification of the prior to admission medication regimen and was questioned regarding use of any other inhalers, topical products, over the counter medications, herbal medications, vitamin products or ophthalmic/nasal/otic medication use. MHT called [employer], [phone], to confirm the patient's oxycodone prescription. Information Obtained From: Patient, outpatient pharmacy, RX Query Recommendations/Findings: The following amendments were made to the patient's active medication list on file at [facility]:  
 
1) Additions:  
? naproxen sodium (ALEVE) 220 mg tablet 
? nystatin (MYCOSTATIN) powder 
? nystatin (MYCOSTATIN) topical cream 
? oxyCODONE IR (ROXICODONE) 5 mg immediate release tablet 2) Removals:  
? lisinopril ? Depo--provera injections 3) Changes: 
? albuterol-ipratropium (DUO-NEB) 2.5 mg-0.5 mg/3 ml nebu (Old regimen: 3 mL Q4H PRN /New regimen: 3 mL BID) ? cyclobenzaprine (FLEXERIL) 5 mg tablet (Old regimen: 5 mg TID PRN /New regimen: 5 mg BID) ? furosemide (LASIX) 40 mg tablet (Old regimen: 60 mg BID /New regimen: 40 mg BID) ? glyBURIDE (DIABETA) 5 mg tablet (Old regimen: 5 mg daily /New regimen: 5 mg BID) 4) Pertinent Pharmacy Findings: 
? cyclobenzaprine (FLEXERIL) 5 mg tablet: Patient stated she was out of this agent, and has not picked up her refill from the pharmacy as of 1/8/19. 
? nitroglycerin (NITROSTAT) 0.4 mg SL tablet: Patient stated she has this agent at home, but has not used it as of 1/8/19. 
? predniSONE (DELTASONE) 10 mg tablet: Patient stated she has not started this agent as of 1/8/19. PTA medication list was corrected to the following:  
 
Prior to Admission Medications Prescriptions Last Dose Informant Patient Reported? Taking? albuterol (VENTOLIN HFA) 90 mcg/actuation inhaler 2019 at Unknown time Self Yes Yes Sig: Take 2 Puffs by inhalation every six (6) hours as needed for Wheezing. albuterol-ipratropium (DUO-NEB) 2.5 mg-0.5 mg/3 ml nebu 2019 at Unknown time Self Yes Yes Sig: 3 mL by Nebulization route two (2) times a day. ammonium lactate (LAC-HYDRIN) 12 % topical cream 2019 at Unknown time Self No Yes Sig: rub in to affected area well  
aspirin 81 mg chewable tablet 2019 at Unknown time Self Yes Yes Sig: Take 81 mg by mouth daily. cyclobenzaprine (FLEXERIL) 5 mg tablet 2019 at Unknown time Self Yes No  
Sig: Take 5 mg by mouth two (2) times a day. fluticasone (FLONASE) 50 mcg/actuation nasal spray 2019 at Unknown time Self No Yes Si Sprays by Both Nostrils route daily. fluticasone-salmeterol (ADVAIR DISKUS) 500-50 mcg/dose diskus inhaler 2019 at Unknown time Self Yes Yes Sig: Take 1 Puff by inhalation every twelve (12) hours. furosemide (LASIX) 40 mg tablet 2019 at Unknown time Self Yes Yes Sig: Take 40 mg by mouth two (2) times a day. glyBURIDE (DIABETA) 5 mg tablet 2019 at Unknown time Self Yes Yes Sig: Take 5 mg by mouth two (2) times daily (with meals). hydrOXYzine pamoate (VISTARIL) 50 mg capsule 2019 at Unknown time Self Yes Yes Sig: Take 50 mg by mouth every eight (8) hours as needed for Itching. insulin glargine (LANTUS) 100 unit/mL injection 2019 at Unknown time Self Yes Yes Si Units by SubCUTAneous route daily. loratadine (CLARITIN) 10 mg tablet 2019 at Unknown time Self Yes Yes Sig: Take 10 mg by mouth daily. lovastatin (MEVACOR) 40 mg tablet 2019 at Unknown time Self No Yes Sig: Take 1 Tab by mouth nightly. metoprolol tartrate (LOPRESSOR) 25 mg tablet 2019 at Unknown time Self Yes Yes Sig: Take 25 mg by mouth two (2) times a day. naproxen sodium (ALEVE) 220 mg tablet 2018 at Unknown time Self Yes Yes Sig: Take 440 mg by mouth every twelve (12) hours as needed for Pain. nitroglycerin (NITROSTAT) 0.4 mg SL tablet Not Taking at Unknown time Self No No  
Si Tab by SubLINGual route every five (5) minutes as needed for Chest Pain (call 911 if not relieved by 3). nystatin (MYCOSTATIN) powder 2019 at Unknown time Self Yes Yes Sig: Apply  to affected area three (3) times daily as needed (skin irratation). nystatin (MYCOSTATIN) topical cream 2019 at Unknown time Self Yes Yes Sig: Apply  to affected area three (3) times daily as needed for Skin Irritation. oxyCODONE IR (ROXICODONE) 5 mg immediate release tablet 2019 at Unknown time Other Yes Yes Sig: Take 5 mg by mouth every four (4) hours as needed for Pain. predniSONE (DELTASONE) 10 mg tablet Not Taking at Unknown time Self No No  
Sig: 3 tablets daily for 3 days, then 2 tablets daily for 3 days, then 1 tablet daily for 3 days. Facility-Administered Medications: None Thank you, 
Guillermo Chiang, [facility] Medication History Pharmacy Technician

## 2019-01-08 NOTE — ED NOTES
TRANSFER - OUT REPORT: 
 
Verbal report given to EDY GREEN(name) on Yahaira Arroyo  being transferred to [facility](unit) for routine progression of care Report consisted of patients Situation, Background, Assessment and  
Recommendations(SBAR). Information from the following report(s) SBAR, Kardex, ED Summary, [facility] and Recent Results was reviewed with the receiving nurse. Lines:  
Peripheral IV 01/08/19 Right Antecubital (Active) Site Assessment Clean, dry, & intact 1/8/2019 12:40 AM  
Phlebitis Assessment 0 1/8/2019 12:40 AM  
Infiltration Assessment 0 1/8/2019 12:40 AM  
Dressing Status Clean, dry, & intact 1/8/2019 12:40 AM  
Dressing Type Tape;Transparent 1/8/2019 12:40 AM  
Hub Color/Line Status Pink;Flushed;Patent 1/8/2019 12:40 AM  
  
 
Opportunity for questions and clarification was provided.

## 2019-01-08 NOTE — ED PROVIDER NOTES
EMERGENCY DEPARTMENT HISTORY AND PHYSICAL EXAM 
 
 
Date: 2019 Patient Name: Teresa Arora History of Presenting Illness Chief Complaint Patient presents with  Chest Pain  Shortness of Breath History Provided By: Patient and EMS 
 
HPI: Teresa Arora, 57 y.o. female with PMHx significant for asthma, CHF, arthritis, NSTEMI, SVT, CAD, COPD, morbid obesity, and diabetes, presents via EMS to the ED s/p discharge yesterday from 5 day admission w/ at [facility] w/ cc of SOB starting today. EMS reports that the pt was sating at 94% on 4L. The pt reports being able to ambulate briefly. She states that her last use of her last breathing tx at noon today. She denies using her cPAP machine, and states that she stopped using the machine due to power outage. The pt reports that a nurse called her house today and asked if she had received her medications yet, to which she replied that she did. She then states that it was not until  that she the person she sent out to get the medications came back with them. The pt specifically denies experiencing fever, cough, chills, HA, CP, or N/V/D. PMHx: asthma, CHF, arthritis, NSTEMI, SVT, CAD, COPD, morbid obesity, and diabetes PSHx: cardiac stents,  SHx: - EtOH, - tobacco, - illicit drugs There are no other complaints, changes, or physical findings at this time. PCP: Lyle Jerome NP Current Outpatient Medications Medication Sig Dispense Refill  fluticasone (FLONASE) 50 mcg/actuation nasal spray 2 Sprays by Both Nostrils route daily. 1 Bottle 0  
 predniSONE (DELTASONE) 10 mg tablet 3 tablets daily for 3 days, then 2 tablets daily for 3 days, then 1 tablet daily for 3 days. 18 Tab 0  
 albuterol-ipratropium (DUO-NEB) 2.5 mg-0.5 mg/3 ml nebu 3 mL by Nebulization route every four (4) hours as needed.  30 Nebule 0  
 Nebulizer & Compressor machine Use as needed 1 Each 0  
  furosemide (LASIX) 40 mg tablet Take 1.5 Tabs by mouth two (2) times a day. 90 Tab 0  
 insulin glargine (LANTUS) 100 unit/mL injection 40 Units by SubCUTAneous route daily.  metoprolol tartrate (LOPRESSOR) 25 mg tablet Take 25 mg by mouth two (2) times a day.  lisinopril (PRINIVIL, ZESTRIL) 40 mg tablet Take 40 mg by mouth daily.  medroxyPROGESTERone (DEPO-PROVERA) 150 mg/mL injection 150 mg by IntraMUSCular route every three (3) months.  fluticasone-salmeterol (ADVAIR DISKUS) 500-50 mcg/dose diskus inhaler Take 1 Puff by inhalation every twelve (12) hours.  loratadine (CLARITIN) 10 mg tablet Take 10 mg by mouth daily.  albuterol (VENTOLIN HFA) 90 mcg/actuation inhaler Take 2 Puffs by inhalation every six (6) hours as needed for Wheezing.  cyclobenzaprine (FLEXERIL) 5 mg tablet Take 5 mg by mouth three (3) times daily as needed. flexeriol  aspirin 81 mg chewable tablet Take 81 mg by mouth daily.  hydrOXYzine pamoate (VISTARIL) 50 mg capsule Take 50 mg by mouth every eight (8) hours as needed for Itching. Indications: Pruritus of Skin  lovastatin (MEVACOR) 40 mg tablet Take 1 Tab by mouth nightly. 30 Tab 6  
 glyBURIDE (DIABETA) 5 mg tablet Take 5 mg by mouth Daily (before breakfast).  ammonium lactate (LAC-HYDRIN) 12 % topical cream rub in to affected area well 280 g 1  
 nitroglycerin (NITROSTAT) 0.4 mg SL tablet 1 Tab by SubLINGual route every five (5) minutes as needed for Chest Pain (call 911 if not relieved by 3). 25 Tab 2 Past History Past Medical History: 
Past Medical History:  
Diagnosis Date  Arthritis  Asthma  Breast lump  CAD (coronary artery disease)  [facility]  COPD (chronic obstructive pulmonary disease) (HCC)  Diabetes ([facility]  Hypertension  Morbid obesity with BMI of 70 and over, adult ([facility]  NSTEMI (non-ST elevated myocardial infarction) ([facility]  SVT (supraventricular tachycardia) (HCC) Past Surgical History: 
Past Surgical History:  
Procedure Laterality Date  CARDIAC SURG PROCEDURE UNLIST Stents  HX  SECTION    
 HX ORTHOPAEDIC    
 HX OTHER SURGICAL    
 cyst removed from back Family History: 
Family History Problem Relation Age of Onset  Heart Disease Mother  Heart Disease Father  Heart Disease Sister  Breast Cancer Maternal Grandmother  Breast Cancer Paternal Grandmother Social History: 
Social History Tobacco Use  Smoking status: Former Smoker Packs/day: 0.25 Types: Cigarettes  Smokeless tobacco: Never Used  Tobacco comment: Patient states \"I  aint smoking no more d*mn cigarettes after yesterday\" 2018 Substance Use Topics  Alcohol use: No  
 Drug use: No  
 
 
Allergies: 
No Known Allergies Review of Systems Review of Systems Constitutional: Negative for activity change, appetite change, fatigue and fever. HENT: Negative. Negative for congestion, rhinorrhea and sore throat. Respiratory: Positive for shortness of breath. Negative for cough and wheezing. Cardiovascular: Negative. Negative for chest pain and leg swelling. Gastrointestinal: Negative. Negative for abdominal distention, abdominal pain, constipation, diarrhea, nausea and vomiting. Endocrine: Negative. Genitourinary: Negative for difficulty urinating, dysuria, menstrual problem, vaginal bleeding and vaginal discharge. Musculoskeletal: Negative. Negative for arthralgias, joint swelling and myalgias. Skin: Negative. Negative for rash. Neurological: Negative. Negative for dizziness, weakness, light-headedness and headaches. Psychiatric/Behavioral: Negative. Physical Exam  
Physical Exam  
Constitutional: She is oriented to person, place, and time. She appears well-nourished. Morbid obesity. PE limited due to body habitus. HENT:  
Head: Atraumatic. Eyes: EOM are normal.  
Cardiovascular: Normal rate, regular rhythm, normal heart sounds and intact distal pulses. Exam reveals no gallop and no friction rub. No murmur heard. Pulmonary/Chest: Effort normal and breath sounds normal. No respiratory distress. She has no wheezes. She has no rales. She exhibits no tenderness. On 4L O2 NC baseline sating at 92-93%. Speaking in full sentences. No focal adventitious findings. Abdominal: Soft. Bowel sounds are normal. She exhibits no distension and no mass. There is no tenderness. There is no rebound and no guarding. Musculoskeletal: Normal range of motion. She exhibits no edema or tenderness. Neurological: She is oriented to person, place, and time. Skin: Skin is warm. Psychiatric: She has a normal mood and affect. Nursing note and vitals reviewed. Diagnostic Study Results Labs - Recent Results (from the past 12 hour(s)) EKG, 12 LEAD, INITIAL Collection Time: 01/07/19 11:28 PM  
Result Value Ref Range Ventricular Rate 86 BPM  
 Atrial Rate 86 BPM  
 P-R Interval 186 ms QRS Duration 76 ms  
 Q-T Interval 350 ms QTC Calculation (Bezet) 418 ms Calculated P Axis 58 degrees Calculated R Axis 73 degrees Calculated T Axis 56 degrees Diagnosis Normal sinus rhythm Low voltage QRS When compared with ECG of 03-JAN-2019 01:24, No significant change was found NT-PRO BNP Collection Time: 01/08/19 12:27 AM  
Result Value Ref Range NT pro- (H) 0 - 125 PG/ML  
TROPONIN I Collection Time: 01/08/19 12:27 AM  
Result Value Ref Range Troponin-I, Qt. <0.05 <0.05 ng/mL SAMPLES BEING HELD Collection Time: 01/08/19 12:27 AM  
Result Value Ref Range SAMPLES BEING HELD LV   
 COMMENT Add-on orders for these samples will be processed based on acceptable specimen integrity and analyte stability, which may vary by analyte. CBC W/O DIFF  Collection Time: 01/08/19 12:30 AM  
 Result Value Ref Range WBC 8.5 3.6 - 11.0 K/uL  
 RBC 3.95 3.80 - 5.20 M/uL  
 HGB 11.5 11.5 - 16.0 g/dL HCT 37.9 35.0 - 47.0 % MCV 95.9 80.0 - 99.0 FL  
 MCH 29.1 26.0 - 34.0 PG  
 MCHC 30.3 30.0 - 36.5 g/dL  
 RDW 13.1 11.5 - 14.5 % PLATELET 489 575 - 122 K/uL MPV 11.6 8.9 - 12.9 FL  
 NRBC 0.0 0  WBC ABSOLUTE NRBC 0.00 0.00 - 0.01 K/uL METABOLIC PANEL, BASIC Collection Time: 01/08/19 12:30 AM  
Result Value Ref Range Sodium 139 136 - 145 mmol/L Potassium 4.2 3.5 - 5.1 mmol/L Chloride 99 97 - 108 mmol/L  
 CO2 30 21 - 32 mmol/L Anion gap 10 5 - 15 mmol/L Glucose 167 (H) 65 - 100 mg/dL BUN 14 6 - 20 MG/DL Creatinine 0.73 0.55 - 1.02 MG/DL  
 BUN/Creatinine ratio 19 12 - 20 GFR est AA >60 >60 ml/min/1.73m2 GFR est non-AA >60 >60 ml/min/1.73m2 Calcium 10.3 (H) 8.5 - 10.1 MG/DL  
BLOOD GAS, ARTERIAL Collection Time: 01/08/19  2:12 AM  
Result Value Ref Range pH 7.38 7.35 - 7.45    
 PCO2 59 (H) 35.0 - 45.0 mmHg PO2 59 (L) 80 - 100 mmHg O2 SAT 90 (L) 92 - 97 % BICARBONATE 34 (H) 22 - 26 mmol/L  
 BASE EXCESS 7.0 mmol/L  
 O2 METHOD NASAL O2    
 O2 FLOW RATE 4.00 L/min SPONTANEOUS RATE 22.0 Sample source ARTERIAL    
 SITE RIGHT RADIAL PARUL'S TEST YES Radiologic Studies -  
 
CT Results  (Last 48 hours) 01/08/19 0127  CTA CHEST W OR W WO CONT Final result Impression:  IMPRESSION:   
1. No pulmonary embolism. 2. Subtle right upper lobe interstitial pneumonia. Resolution of right middle  
lobe interstitial pneumonia. 3. No CT evidence of emphysema. 4. Cardiomegaly, coronary artery disease, and possible pulmonary hypertension. Narrative:  EXAM:  CTA CHEST W OR W WO CONT INDICATION:  Chest pain and shortness of breath. Discharge from Hospital for COPD treatment one day ago. COMPARISON: CT angiography chest on 8/19/2018. TECHNIQUE: Helical thin section chest CT following uneventful intravenous  
administration of nonionic contrast 100 mL of isovue 370 according to  
departmental PE protocol. Coronal and sagittal reformats were performed. 3D post  
processing was performed. CT dose reduction was achieved through the use of a  
standardized protocol tailored for this examination and automatic exposure  
control for dose modulation. FINDINGS: This is a adequate quality study for the evaluation of pulmonary  
embolism to the first subsegmental arterial level. There is no pulmonary  
embolism to this level. Main pulmonary artery is mildly enlarged. THYROID: No nodule. MEDIASTINUM: No mass or lymphadenopathy. JAZMIN: No mass or lymphadenopathy. THORACIC AORTA: Atherosclerosis without aneurysm. HEART: Mild cardiomegaly. No effusion. Coronary artery calcific atherosclerosis  
is visible. ESOPHAGUS: No wall thickening or dilatation. TRACHEA/BRONCHI: Patent. PLEURA: No effusion or pneumothorax. LUNGS: Subtle interstitial nodules are in the right upper lobe. Right middle  
lobe interstitial nodules have resolved. No evidence of emphysema. No lobar  
pneumonia or pulmonary edema. UPPER ABDOMEN: Partially imaged. No acute pathology. BONES: No aggressive bone lesion or fracture. Medical Decision Making I am the first provider for this patient. I reviewed the vital signs, available nursing notes, past medical history, past surgical history, family history and social history. Vital Signs-Reviewed the patient's vital signs. Patient Vitals for the past 12 hrs: 
 Temp Pulse Resp BP SpO2  
01/08/19 0350  89   94 % 01/08/19 0337  94   91 % 01/08/19 0015  94 26 118/80   
01/08/19 0001  88 20 123/87 91 % 01/07/19 2345    138/61 93 % 01/07/19 2329     94 % 01/07/19 2323 98.3 °F (36.8 °C) 89 21  94 % Pulse Oximetry Analysis - 94% on RA Cardiac Monitor: Rate: 89 bpm 
Rhythm: Normal Sinus Rhythm EKG interpretation: (Preliminary) (23:28:14) Rhythm: normal sinus rhythm; and regular . Rate (approx.): 86; Axis: normal; LA interval: normal; QRS interval: normal ; ST/T wave: normal; Other findings: none. Written by SHRUTHI Booneibe, as dictated by George Haider MD. Records Reviewed: Nursing Notes and Old Medical Records Provider Notes (Medical Decision Making): DDx: hypoventilatory syndrome, acute on chronic respiratory failure, acute on chronic heart failure, generalized deconditioning s/p 5 day hospitalization, low suspicion for new PNA or PE, however will escalate to include CTA given this was not performed on this recent hospitalization at OSH 
 
ED Course:  
Initial assessment performed. The patients presenting problems have been discussed, and they are in agreement with the care plan formulated and outlined with them. I have encouraged them to ask questions as they arise throughout their visit. Critical Care Time: 0 minutes PROGRESS NOTE: 
4:51 AM 
Pt refusing to be discharged from the ED at this time. Will consult with the hospitalist. If the hospitalist agrees with plan for discharge will move forward. Written by Bina Queen ED Scribe, as dictated by George Haider MD 
 
CONSULT NOTE:  
4:52 AM 
George Haider MD spoke with Dr. Tulio Bravo, Specialty: Hospitalist 
Discussed pt's hx, disposition, and available diagnostic and imaging results. Reviewed care plans. Consultant will evaluate pt for admission. Written by SHRUTHI Davisibsue, as dictated by George Haider MD 
 
CONSULT NOTE:  
6:36 George Haider MD spoke with Dr. Tulio Bravo, Specialty: Hospitalist 
Discussed pt's hx, disposition, and available diagnostic and imaging results. Reviewed care plans. Consultant has decided to admit patient to Observation. Written by SHRUTHI Davis, as dictated by George Haider MD 
 
Disposition: 
ADMIT OBS Diagnosis Clinical Impression: 1. Acute on chronic respiratory failure with hypoxia and hypercapnia (HCC) 2. Chronic obstructive pulmonary disease, unspecified COPD type ([facility] 3. Upper respiratory tract infection, unspecified type 4. Chronic respiratory failure with hypoxia, on home oxygen therapy (HCC) 5. Non compliance w medication regimen Attestation: This note is prepared by Stewart Blanc, acting as Scribe for Delta William MD. 
 
Delta William MD: The scribe's documentation has been prepared under my direction and personally reviewed by me in its entirety. I confirm that the note above accurately reflects all work, treatment, procedures, and medical decision making performed by me.

## 2019-01-08 NOTE — PROGRESS NOTES
Reason for Readmission:     Pt is a 58 [facility] [place] who was admitted under OBSERVATION with a diagnosis of respiratory failure RRAT Score and Risk Level:     RRAT 30; HIGH risk Level of Readmission:   Level 1 potential   
   
Care Conference scheduled:   CM discussed care with pt's nurse and attending on 1/8/19 Resources/supports as identified by patient/family:   Pt has supportive family and aides Top Challenges facing patient (as identified by patient/family and CM): Finances/Medication cost?     None identified Transportation      Pt does not drive, but utilizes transportation through [facility]. Pt will require BLS transport upon discharge Support system or lack thereof? Pt has supportive family, utilizes aide services through [facility] Living arrangements? Pt lives with family in a 1 story home with 5 NIKOS Self-care/ADLs/Cognition? Pt requires assistance with ADL/IADL needs at baseline. Pt has an aide who assists the pt 7 days a week for 6 hours a day through [facility] Current Advanced Directive/Advance Care Plan:  Pt is a FULL CODE. Pt has an advance directive on chart Plan for utilizing home health:   Pt is currently open to [address] for home health services. Pt has not utilized SNF prior to admission Likelihood of additional readmission:   High 
          
Transition of Care Plan:    Based on readmission, the patient's previous Plan of Care 
 has been evaluated and/or modified. The current Transition of Care Plan is:       CM met with pt re: assessment, discharge planning. CM introduced self, role. Pt verbalized understanding. Pt verified demographic information on chart. Per H&P note, pt requires assistance getting a new mask for her trilogy machine. Pt stated he current trilogy machine is serviced by [facility], but they do not accept her insurance at this time. CM called [facility] re: trilogy machine. Per [address], they do not accept [facility] for trilogy machines. CM called Avni with [facility] re: trilogy machine. Avni stated he will look into who accepts [facility] for the trilogy machine and if new paperwork will need to be completed if the pt requires a new agency to service machine. No further questions or needs identified at this time. CM will continue to remain available for support, discharge planning as needed. Care Management Interventions PCP Verified by CM: Yes Mode of Transport at Discharge: BLS(Pt utilizes stretcher transport) Transition of Care Consult (CM Consult): Discharge Planning(Pt requires assistance with ADL/IADL needs at baseline. Pt receives help from Aide 7 days a week for 6 hours. Pt is currently open to [facility]. Pt has not utilized SNF) Discharge Durable Medical Equipment: No(Pt utilizes 4L O2 at home provided through [address]. Pt has access to a trilogy machine which is services by [facility], [facility], Shower Chair, Lucho Tavares, wheelchair) Physical Therapy Consult: Yes Occupational Therapy Consult: No 
Current Support Network: Own Home(Pt lives with family in a 1 story home with 5 NIKOS) Confirm Follow Up Transport: Other (see comment)(Pt utilizes Medicaid for transportation services) Plan discussed with Pt/Family/Caregiver: Yes Discharge Location Discharge Placement: (TBD) Antonella Shen, MSW, LSW Supervisee in Social Work [facility] [phone]

## 2019-01-08 NOTE — PROGRESS NOTES
Pharmacy  Enoxaparin (Lovenox®) Monitoring Indication: DVT prophylaxis Current Dose: Enoxaparin 40 mg subcutaneously every 24 hours Creatinine Clearance (mL/min): 90.1 ml/min Current Weight: 189.1 Kg (BMI 67.31) Labs: 
Recent Labs 01/08/19 
0030 01/06/19 
3355 CREA 0.73 0.75 HGB 11.5 10.3*  163 Wt Readings from Last 1 Encounters:  
01/07/19 (!) 189.1 kg (417 lb) Ht Readings from Last 1 Encounters:  
01/07/19 167.6 cm (66\") Impression/Plan:  
· Change to enoxaparin 40 mg subcutaneously every 12hr per protocol for obese patients with BMI >40 Thanks, Ivania Chakraborty, PHARMD

## 2019-01-09 LAB
ANION GAP SERPL CALC-SCNC: 7 MMOL/L (ref 5–15)
APPEARANCE UR: CLEAR
BACTERIA URNS QL MICRO: NEGATIVE /HPF
BILIRUB UR QL: NEGATIVE
BUN SERPL-MCNC: 12 MG/DL (ref 6–20)
BUN/CREAT SERPL: 14 (ref 12–20)
CALCIUM SERPL-MCNC: 10.3 MG/DL (ref 8.5–10.1)
CHLORIDE SERPL-SCNC: 99 MMOL/L (ref 97–108)
CO2 SERPL-SCNC: 30 MMOL/L (ref 21–32)
COLOR UR: NORMAL
CREAT SERPL-MCNC: 0.85 MG/DL (ref 0.55–1.02)
EPITH CASTS URNS QL MICRO: NORMAL /LPF
GLUCOSE BLD STRIP.AUTO-MCNC: 200 MG/DL (ref 65–100)
GLUCOSE BLD STRIP.AUTO-MCNC: 206 MG/DL (ref 65–100)
GLUCOSE BLD STRIP.AUTO-MCNC: 251 MG/DL (ref 65–100)
GLUCOSE BLD STRIP.AUTO-MCNC: 283 MG/DL (ref 65–100)
GLUCOSE SERPL-MCNC: 202 MG/DL (ref 65–100)
GLUCOSE UR STRIP.AUTO-MCNC: NEGATIVE MG/DL
HGB UR QL STRIP: NEGATIVE
HYALINE CASTS URNS QL MICRO: NORMAL /LPF (ref 0–5)
KETONES UR QL STRIP.AUTO: NEGATIVE MG/DL
LEUKOCYTE ESTERASE UR QL STRIP.AUTO: NEGATIVE
NITRITE UR QL STRIP.AUTO: NEGATIVE
PH UR STRIP: 8 [PH] (ref 5–8)
POTASSIUM SERPL-SCNC: 4.2 MMOL/L (ref 3.5–5.1)
PROT UR STRIP-MCNC: NEGATIVE MG/DL
RBC #/AREA URNS HPF: NORMAL /HPF (ref 0–5)
SERVICE CMNT-IMP: ABNORMAL
SODIUM SERPL-SCNC: 136 MMOL/L (ref 136–145)
SP GR UR REFRACTOMETRY: 1.01 (ref 1–1.03)
UA: UC IF INDICATED,UAUC: NORMAL
UROBILINOGEN UR QL STRIP.AUTO: 1 EU/DL (ref 0.2–1)
WBC URNS QL MICRO: NORMAL /HPF (ref 0–4)

## 2019-01-09 PROCEDURE — 77010033678 HC OXYGEN DAILY

## 2019-01-09 PROCEDURE — 80048 BASIC METABOLIC PNL TOTAL CA: CPT

## 2019-01-09 PROCEDURE — 74011000250 HC RX REV CODE- 250: Performed by: INTERNAL MEDICINE

## 2019-01-09 PROCEDURE — 94660 CPAP INITIATION&MGMT: CPT

## 2019-01-09 PROCEDURE — 94640 AIRWAY INHALATION TREATMENT: CPT

## 2019-01-09 PROCEDURE — 74011250637 HC RX REV CODE- 250/637: Performed by: INTERNAL MEDICINE

## 2019-01-09 PROCEDURE — 74011250636 HC RX REV CODE- 250/636: Performed by: INTERNAL MEDICINE

## 2019-01-09 PROCEDURE — 99218 HC RM OBSERVATION: CPT

## 2019-01-09 PROCEDURE — G0378 HOSPITAL OBSERVATION PER HR: HCPCS

## 2019-01-09 PROCEDURE — 96372 THER/PROPH/DIAG INJ SC/IM: CPT

## 2019-01-09 PROCEDURE — 82962 GLUCOSE BLOOD TEST: CPT

## 2019-01-09 PROCEDURE — 81001 URINALYSIS AUTO W/SCOPE: CPT

## 2019-01-09 PROCEDURE — 94760 N-INVAS EAR/PLS OXIMETRY 1: CPT

## 2019-01-09 PROCEDURE — 93306 TTE W/DOPPLER COMPLETE: CPT

## 2019-01-09 PROCEDURE — 36415 COLL VENOUS BLD VENIPUNCTURE: CPT

## 2019-01-09 PROCEDURE — 74011636637 HC RX REV CODE- 636/637: Performed by: INTERNAL MEDICINE

## 2019-01-09 RX ORDER — PREDNISONE 20 MG/1
40 TABLET ORAL
Status: DISCONTINUED | OUTPATIENT
Start: 2019-01-10 | End: 2019-01-10 | Stop reason: HOSPADM

## 2019-01-09 RX ORDER — MICONAZOLE NITRATE 20 MG/G
CREAM TOPICAL 2 TIMES DAILY
Status: DISCONTINUED | OUTPATIENT
Start: 2019-01-09 | End: 2019-01-10 | Stop reason: HOSPADM

## 2019-01-09 RX ORDER — IPRATROPIUM BROMIDE AND ALBUTEROL SULFATE 2.5; .5 MG/3ML; MG/3ML
3 SOLUTION RESPIRATORY (INHALATION)
Status: DISCONTINUED | OUTPATIENT
Start: 2019-01-09 | End: 2019-01-10 | Stop reason: HOSPADM

## 2019-01-09 RX ADMIN — IPRATROPIUM BROMIDE AND ALBUTEROL SULFATE 3 ML: .5; 3 SOLUTION RESPIRATORY (INHALATION) at 11:47

## 2019-01-09 RX ADMIN — METOPROLOL TARTRATE 25 MG: 25 TABLET ORAL at 08:13

## 2019-01-09 RX ADMIN — ASPIRIN 81 MG 81 MG: 81 TABLET ORAL at 08:13

## 2019-01-09 RX ADMIN — ENOXAPARIN SODIUM 40 MG: 40 INJECTION SUBCUTANEOUS at 21:50

## 2019-01-09 RX ADMIN — PRAVASTATIN SODIUM 40 MG: 10 TABLET ORAL at 21:50

## 2019-01-09 RX ADMIN — PREDNISONE 20 MG: 20 TABLET ORAL at 08:13

## 2019-01-09 RX ADMIN — Medication 10 ML: at 21:50

## 2019-01-09 RX ADMIN — IPRATROPIUM BROMIDE AND ALBUTEROL SULFATE 3 ML: .5; 3 SOLUTION RESPIRATORY (INHALATION) at 19:59

## 2019-01-09 RX ADMIN — FLUTICASONE FUROATE AND VILANTEROL TRIFENATATE 1 PUFF: 200; 25 POWDER RESPIRATORY (INHALATION) at 14:07

## 2019-01-09 RX ADMIN — MICONAZOLE NITRATE: 20 CREAM TOPICAL at 18:06

## 2019-01-09 RX ADMIN — Medication 10 ML: at 18:08

## 2019-01-09 RX ADMIN — FUROSEMIDE 40 MG: 40 TABLET ORAL at 08:13

## 2019-01-09 RX ADMIN — INSULIN GLARGINE 40 UNITS: 100 INJECTION, SOLUTION SUBCUTANEOUS at 08:13

## 2019-01-09 RX ADMIN — IPRATROPIUM BROMIDE AND ALBUTEROL SULFATE 3 ML: .5; 3 SOLUTION RESPIRATORY (INHALATION) at 02:04

## 2019-01-09 RX ADMIN — Medication 10 ML: at 05:15

## 2019-01-09 RX ADMIN — METOPROLOL TARTRATE 25 MG: 25 TABLET ORAL at 18:07

## 2019-01-09 RX ADMIN — INSULIN LISPRO 3 UNITS: 100 INJECTION, SOLUTION INTRAVENOUS; SUBCUTANEOUS at 08:12

## 2019-01-09 RX ADMIN — INSULIN LISPRO 5 UNITS: 100 INJECTION, SOLUTION INTRAVENOUS; SUBCUTANEOUS at 18:06

## 2019-01-09 RX ADMIN — ENOXAPARIN SODIUM 40 MG: 40 INJECTION SUBCUTANEOUS at 08:13

## 2019-01-09 RX ADMIN — LORATADINE 10 MG: 10 TABLET ORAL at 08:13

## 2019-01-09 RX ADMIN — CYCLOBENZAPRINE HYDROCHLORIDE 5 MG: 10 TABLET, FILM COATED ORAL at 18:12

## 2019-01-09 RX ADMIN — INSULIN LISPRO 3 UNITS: 100 INJECTION, SOLUTION INTRAVENOUS; SUBCUTANEOUS at 14:07

## 2019-01-09 RX ADMIN — FUROSEMIDE 40 MG: 40 TABLET ORAL at 18:08

## 2019-01-09 RX ADMIN — INSULIN LISPRO 3 UNITS: 100 INJECTION, SOLUTION INTRAVENOUS; SUBCUTANEOUS at 22:00

## 2019-01-09 RX ADMIN — Medication: at 18:07

## 2019-01-09 NOTE — CONSULTS
Asked to see patient to write a prescription for her new Trilogy to replace her old Trilogy. Her Trilogy was prescribed by Dr. Javier Reed with [facility] for her GEN, and a new prescription needs to come from his office. She does not follow up with our group as an outpatient. Communicated this with patient's nurse.    Jos Tracey MD

## 2019-01-09 NOTE — PROGRESS NOTES
Report received from [address] at bedside. Patient requesting large bedside commode and recliner. All rooms are full and house keeping doesn't have any available at this time.

## 2019-01-09 NOTE — DIABETES MGMT
DTC Consult Note Recommendations/ Comments: noted pt receiving prednisone 20 mg BID. Pt with BG > 300 mg/dL yesterday. Required 16 units of correction yesterday. 1. Adding NPH 10 units timed and linked with prednisone to help address BG spikes for steroids. 2. Add CHO consistent to current diet Current hospital DM medication: Lantus 40 units, Glyburide 5 mg daily and ,Humalog for correction, normal sensitivity scale Chart reviewed and initial evaluation complete on Yahaira Maciel. Patient is a 57 y.o. female with hx Type 2 Diabetes on Lantus 40 units daily AM and Glyburide 5 mg daily at home. A1c:  
Lab Results Component Value Date/Time Hemoglobin A1c 8.4 (H) 10/21/2018 04:36 AM  
 
 
Recent Glucose Results:  
Lab Results Component Value Date/Time  (H) 01/09/2019 05:55 AM  
 GLUCPOC 283 (H) 01/09/2019 04:46 PM  
 GLUCPOC 200 (H) 01/09/2019 11:00 AM  
 GLUCPOC 206 (H) 01/09/2019 07:08 AM  
  
 
Lab Results Component Value Date/Time Creatinine 0.85 01/09/2019 05:55 AM  
 
Estimated Creatinine Clearance: 139.2 mL/min (based on SCr of 0.85 mg/dL). Active Orders Diet DIET CARDIAC Regular PO intake:  
Patient Vitals for the past 72 hrs: 
 % Diet Eaten 01/09/19 1250 100 % 01/09/19 0817 90 % Will continue to follow as needed. Thank you. Clary Soulier, RD [facility] Time spent: 6 minutes

## 2019-01-09 NOTE — PROGRESS NOTES
Problem: Falls - Risk of 
Goal: *Absence of Falls Document Anita Perez Fall Risk and appropriate interventions in the flowsheet. Outcome: Progressing Towards Goal 
Fall Risk Interventions: 
Mobility Interventions: OT consult for ADLs, Utilize walker, cane, or other assistive device, Patient to call before getting OOB, PT Consult for mobility concerns Medication Interventions: Bed/chair exit alarm, Patient to call before getting OOB Elimination Interventions: Call light in reach, Patient to call for help with toileting needs History of Falls Interventions: Bed/chair exit alarm, Door open when patient unattended

## 2019-01-09 NOTE — PROGRESS NOTES
Patient requesting BiPAP and cream for abdomen where it is excoriated. Got patient some barrier cream and BiPAP was ordered with settings from [facility]. Will get in touch with respiratory.

## 2019-01-09 NOTE — PROGRESS NOTES
ADULT PROTOCOL: JET AEROSOL ASSESSMENT Patient  Teodoro Paredes     57 y.o.   female     1/9/2019  12:42 PM 
 
Breath Sounds Pre Procedure: Right Breath Sounds: Diminished Left Breath Sounds: Diminished Breath Sounds Post Procedure: Right Breath Sounds: Diminished Left Breath Sounds: Diminished Breathing pattern: Pre procedure Breathing Pattern: Regular Post procedure Breathing Pattern: Regular Heart Rate: Pre procedure Pulse: 83 
         Post procedure Pulse: 93 Resp Rate: Pre procedure Respirations: 20 
         Post procedure Respirations: 20 
 
Oxygen: O2 Device: Nasal cannula   5lpm 
 
SpO2: Pre procedure SpO2: 91 % Post procedure SpO2: 92 % Nebulizer Therapy: Current medications Aerosolized Medications: DuoNeb Smoking History: FORMER Problem List:  
Patient Active Problem List  
Diagnosis Code  CHF (congestive heart failure) ([facility]) I50.9  Malignant essential hypertension I10  
 Asthma J45.909  Obesity hypoventilation syndrome ([facility]) E66.2  Dyspnea R06.00  Chest pressure R07.89  
 Acute on chronic diastolic heart failure ([facility]) I50.33  
 SVT (supraventricular tachycardia) ([facility]) I47.1  NSTEMI (non-ST elevated myocardial infarction) ([facility]) I21.4  
 S/P PTCA (percutaneous transluminal coronary angioplasty) Z98.61  
 Coronary atherosclerosis of native coronary artery I25.10  Hypoxia R09.02  
 Noncompliance with therapeutic plan Z91.11  
 Chest pain R07.9  GERD (gastroesophageal reflux disease) K21.9  Acute respiratory failure with hypoxia and hypercarbia ([facility]) J96.01, J96.02  
 COPD (chronic obstructive pulmonary disease) ([facility]) J44.9  Tobacco abuse Z72.0  Obesity, morbid (more than 100 lbs over ideal weight or BMI > 40) ([facility]) E66.01  
 Cellulitis L03.90  
 SIRS due to infectious process with acute organ dysfunction ([facility]) A41.9, R65.20  Sepsis associated hypotension (HCC) A41.9, I95.9  Gangrenous toe ([facility] [id number]  Type II diabetes mellitus, uncontrolled (HCC) E11.65  
 HTN (hypertension) T41  
 Diastolic CHF, chronic ([facility]) I50.32  
 Cough with hemoptysis R04.2  Lymphedema of both lower extremities I89.0  
 CAP (community acquired pneumonia) J18.9  Cellulitis, leg L03.119  
 COPD exacerbation ([facility]) J44.1  Maggot infestation B87.9  Shortness of breath R06.02  
 Multifocal pneumonia J18.9  Acute respiratory failure ([facility]) J96.00  
 Acute on chronic respiratory failure with hypoxia and hypercapnia ([facility]) J96.21, J96.22  
 NSVT (nonsustained ventricular tachycardia) ([facility]) I47.2  Res[facility] failure [facility] J96.90  Elevated lipase R74.8  Abdominal pain R10.9  Counseling regarding goals of care Z71.89  
 Acute pancreatitis K85.90  Breast cancer ([address] [facility] C50.919  Intertrigo L30.4  PNA (pneumonia) J18.9  Productive cough R05  
 Other fatigue R53.83  
 Acute chest pain R07.9 Respiratory Therapist: Michelle Vera

## 2019-01-09 NOTE — PROGRESS NOTES
QUINN called Mahad for resumption of home health care services. A representative will call QUINN back to verify. ROSCOE Carbajal, [facility] Care Management [phone] 
 
4:15 PM 
[facility].

## 2019-01-09 NOTE — PHYSICIAN ADVISORY
Letter of Determination:  Outpatient Status receiving Observation Services This case was reviewed, and I concur with Outpatient status receiving Observation services. This determination may change depending on further medical information, condition changes of the patient, or treatment requirements. Antonio Colunga MD, JIN, Physician Advisor [address] [place] [address].

## 2019-01-09 NOTE — PROGRESS NOTES
Hospitalist Progress NoteNAME: Aide aMlhotra :  1970 MRN:  [id number] Admit date: 2019 Today's date: 19 PCP: MARLEEN Buckley M.D. Cell [phone] Assessment / Plan: 
Acute on chronic hypoxemic hypercapnic respiratory failure POA 
obesity hypoventilation syndrome POA 
COPD POA Nebs, oral prednisone, DuoNeb nebs BIPAP at night PT/OT consults CTA chest no PE, No pulmonary embolism. Subtle right upper lobe interstitial pneumonia. Resolution of right middle lobe interstitial pneumonia. No CT evidence of emphysema. pBNP 242, wean lasix CM trying to get home triligy set up Hopefully discharge in Am Chest pain POA 
serial troponin negative No PE Check echo DM type 2 POA Hold oral hypoglycemic medication Start patient on scheduled insulin SSI Essential hypertension POA Continue home antihypertensive medication Morbid obesity POA Body mass index is 63.2 kg/m². 
  
Code Status: full Surrogate Decision Maker:SON  
  
DVT Prophylaxis: Lovenox GI Prophylaxis: not indicated Subjective: Chief Complaint / Reason for Physician Visit f/u acute on chronic respiratory failure \"I got SOB of breath this Am when I walked to the bathroom\". Discussed with RN events overnight. Breathing a bit better, no current CP Seen off bIPAP, wore it overnight CM trying to set up home triligy Review of Systems: 
Symptom Y/N Comments  Symptom Y/N Comments Fever/Chills n   Chest Pain n   
Poor Appetite    Edema Cough n   Abdominal Pain n   
Sputum    Joint Pain SOB/RAZO y   Headache Nausea/vomit n   Tolerating PT/OT Diarrhea n   Tolerating Diet y Constipation    Other Could NOT obtain due to:   
 
Objective: VITALS:  
Last 24hrs VS reviewed since prior progress note. Most recent are: 
Patient Vitals for the past 24 hrs: 
 Temp Pulse Resp BP SpO2 01/09/19 1447 97.8 °F (36.6 °C) 81 24 108/55 94 % 01/09/19 1147     91 % 01/09/19 1028 97.8 °F (36.6 °C) 90 25 108/54 90 % 01/09/19 0826 97.7 °F (36.5 °C)      
01/09/19 0714 97.6 °F (36.4 °C) 84 20 101/40 95 % 01/09/19 0439 98.4 °F (36.9 °C) 75 20 98/52 95 % 01/09/19 0221     98 % 01/09/19 0205     98 % 01/08/19 2323 98 °F (36.7 °C) 87 20 107/56 97 % 01/08/19 2153     95 % 01/08/19 1814 97.6 °F (36.4 °C) 95 18 129/58 96 % Intake/Output Summary (Last 24 hours) at 1/9/2019 1728 Last data filed at 1/9/2019 7704 Gross per 24 hour Intake 960 ml Output 3500 ml Net -2540 ml Wt Readings from Last 12 Encounters:  
01/09/19 (!) 177.6 kg (391 lb 8.6 oz) 01/06/19 (!) 184 kg (405 lb 11.2 oz) 10/26/18 (!) 185 kg (407 lb 13.6 oz)  
09/27/18 (!) 188.7 kg (416 lb)  
08/23/18 (!) 182.9 kg (403 lb 3 oz) 07/12/18 (!) 191.9 kg (423 lb 1 oz) 04/25/18 (!) 179.6 kg (396 lb) 02/01/18 (!) 180 kg (396 lb 13.3 oz) 01/26/18 (!) 177.4 kg (391 lb)  
01/23/18 (!) 177.9 kg (392 lb 2 oz)  
11/26/17 (!) 201.9 kg (445 lb)  
09/27/17 (!) 185.5 kg (409 lb) PHYSICAL EXAM: 
General: WD, WN. Alert, cooperative, no acute distress off BIPAP 
EENT:  PERRL. Anicteric sclerae. MMM Resp:  Decreased BS  bilaterally, no wheezing or rales. No accessory muscle use CV:  Regular  rhythm,  No edema GI:  Soft, Non distended, Non tender.  +Bowel sounds, no rebound Neurologic:  Alert and oriented X 3, normal speech, non focal motor exam 
Psych:   Not anxious nor agitated Skin:  No rashes. No jaundice Reviewed most current lab test results and cultures  YES Reviewed most current radiology test results   YES Review and summation of old records today    NO Reviewed patient's current orders and MAR    YES 
PMH/SH reviewed - no change compared to H&P 
________________________________________________________________________ Care Plan discussed with: 
  Comments Patient x Family RN x   
Care Manager x Consultant Multidiciplinary team rounds were held today with , nursing, pharmacist and clinical coordinator. Patient's plan of care was discussed; medications were reviewed and discharge planning was addressed. ________________________________________________________________________ Total NON critical care TIME: 25   Minutes Total CRITICAL CARE TIME Spent:   Minutes non procedure based Comments >50% of visit spent in counseling and coordination of care    
________________________________________________________________________ Cassandra Burkitt, MD  
 
Procedures: see electronic medical records for all procedures/Xrays and details which were not copied into this note but were reviewed prior to creation of Plan. LABS: 
I reviewed today's most current labs and imaging studies. Pertinent labs include: 
Recent Labs 01/08/19 
0746 01/08/19 
0030 WBC 10.0 8.5 HGB 11.7 11.5 HCT 38.4 37.9  193 Recent Labs 01/09/19 
0555 01/08/19 
0732 01/08/19 
0030  135* 139  
K 4.2 4.6 4.2 CL 99 99 99 CO2 30 30 30 * 179* 167* BUN 12 13 14 CREA 0.85 0.80 0.73 CA 10.3* 10.7* 10.3*

## 2019-01-09 NOTE — PROGRESS NOTES
Payton Hammonds with [facility] [address] from [facility] visited CM to discuss trilogy replacement in pt's home. [employer] left an order form with CM for attending to complete and sign. It was also requested that pt have a pulmonology consult while in the hospital since she will be receiving this equipment. RN will request consult from attending. CM will continue to follow. ROSCOE Leon, [facility] Care Management [phone] 
 
4:11 PM 
Trilogy order form signed by Dr. Nirali Collins. Payton Hammonds with [facility] will  form tomorrow morning. Pt will need a follow-up appt with [facility] following d/c.

## 2019-01-09 NOTE — PROGRESS NOTES
Got a call from lab stating that CBC was clotted. Patient is a very hard stick. I tried and charge nurse tried, getting blood on second stick. Will ask day shift to try.

## 2019-01-09 NOTE — PROGRESS NOTES
0720 
Report received from [facility], [facility]. SBAR, Kardex, ED Summary, Procedure Summary, Intake/Output, MAR, Accordion, Recent Results, Med Rec Status and Cardiac Rhythm NSR were discussed. [address]

## 2019-01-10 VITALS
HEIGHT: 66 IN | TEMPERATURE: 97.6 F | OXYGEN SATURATION: 95 % | BODY MASS INDEX: 47.09 KG/M2 | WEIGHT: 293 LBS | DIASTOLIC BLOOD PRESSURE: 79 MMHG | HEART RATE: 76 BPM | RESPIRATION RATE: 16 BRPM | SYSTOLIC BLOOD PRESSURE: 121 MMHG

## 2019-01-10 LAB
ANION GAP SERPL CALC-SCNC: 7 MMOL/L (ref 5–15)
BASOPHILS # BLD: 0 K/UL (ref 0–0.1)
BASOPHILS NFR BLD: 0 % (ref 0–1)
BUN SERPL-MCNC: 19 MG/DL (ref 6–20)
BUN/CREAT SERPL: 23 (ref 12–20)
CALCIUM SERPL-MCNC: 10 MG/DL (ref 8.5–10.1)
CHLORIDE SERPL-SCNC: 100 MMOL/L (ref 97–108)
CO2 SERPL-SCNC: 31 MMOL/L (ref 21–32)
CREAT SERPL-MCNC: 0.82 MG/DL (ref 0.55–1.02)
DIFFERENTIAL METHOD BLD: ABNORMAL
EOSINOPHIL # BLD: 0.1 K/UL (ref 0–0.4)
EOSINOPHIL NFR BLD: 1 % (ref 0–7)
ERYTHROCYTE [DISTWIDTH] IN BLOOD BY AUTOMATED COUNT: 13.2 % (ref 11.5–14.5)
GLUCOSE BLD STRIP.AUTO-MCNC: 135 MG/DL (ref 65–100)
GLUCOSE BLD STRIP.AUTO-MCNC: 172 MG/DL (ref 65–100)
GLUCOSE BLD STRIP.AUTO-MCNC: 244 MG/DL (ref 65–100)
GLUCOSE SERPL-MCNC: 197 MG/DL (ref 65–100)
HCT VFR BLD AUTO: 37.3 % (ref 35–47)
HGB BLD-MCNC: 11.5 G/DL (ref 11.5–16)
IMM GRANULOCYTES # BLD AUTO: 0 K/UL (ref 0–0.04)
IMM GRANULOCYTES NFR BLD AUTO: 0 % (ref 0–0.5)
LYMPHOCYTES # BLD: 1.5 K/UL (ref 0.8–3.5)
LYMPHOCYTES NFR BLD: 13 % (ref 12–49)
MCH RBC QN AUTO: 28.9 PG (ref 26–34)
MCHC RBC AUTO-ENTMCNC: 30.8 G/DL (ref 30–36.5)
MCV RBC AUTO: 93.7 FL (ref 80–99)
MONOCYTES # BLD: 0.9 K/UL (ref 0–1)
MONOCYTES NFR BLD: 8 % (ref 5–13)
NEUTS SEG # BLD: 9 K/UL (ref 1.8–8)
NEUTS SEG NFR BLD: 78 % (ref 32–75)
NRBC # BLD: 0 K/UL (ref 0–0.01)
NRBC BLD-RTO: 0 PER 100 WBC
PLATELET # BLD AUTO: 198 K/UL (ref 150–400)
POTASSIUM SERPL-SCNC: 4 MMOL/L (ref 3.5–5.1)
RBC # BLD AUTO: 3.98 M/UL (ref 3.8–5.2)
RBC MORPH BLD: ABNORMAL
SERVICE CMNT-IMP: ABNORMAL
SODIUM SERPL-SCNC: 138 MMOL/L (ref 136–145)
WBC # BLD AUTO: 11.5 K/UL (ref 3.6–11)

## 2019-01-10 PROCEDURE — 96372 THER/PROPH/DIAG INJ SC/IM: CPT

## 2019-01-10 PROCEDURE — 77010033678 HC OXYGEN DAILY

## 2019-01-10 PROCEDURE — 74011636637 HC RX REV CODE- 636/637: Performed by: INTERNAL MEDICINE

## 2019-01-10 PROCEDURE — 99218 HC RM OBSERVATION: CPT

## 2019-01-10 PROCEDURE — G0378 HOSPITAL OBSERVATION PER HR: HCPCS

## 2019-01-10 PROCEDURE — 74011250636 HC RX REV CODE- 250/636: Performed by: INTERNAL MEDICINE

## 2019-01-10 PROCEDURE — 74011250637 HC RX REV CODE- 250/637: Performed by: INTERNAL MEDICINE

## 2019-01-10 PROCEDURE — 74011000250 HC RX REV CODE- 250: Performed by: INTERNAL MEDICINE

## 2019-01-10 PROCEDURE — 80048 BASIC METABOLIC PNL TOTAL CA: CPT

## 2019-01-10 PROCEDURE — 94660 CPAP INITIATION&MGMT: CPT

## 2019-01-10 PROCEDURE — 85025 COMPLETE CBC W/AUTO DIFF WBC: CPT

## 2019-01-10 PROCEDURE — 36415 COLL VENOUS BLD VENIPUNCTURE: CPT

## 2019-01-10 PROCEDURE — 94640 AIRWAY INHALATION TREATMENT: CPT

## 2019-01-10 PROCEDURE — 94760 N-INVAS EAR/PLS OXIMETRY 1: CPT

## 2019-01-10 PROCEDURE — 82962 GLUCOSE BLOOD TEST: CPT

## 2019-01-10 RX ORDER — IPRATROPIUM BROMIDE AND ALBUTEROL SULFATE 2.5; .5 MG/3ML; MG/3ML
3 SOLUTION RESPIRATORY (INHALATION) 4 TIMES DAILY
Qty: 100 NEBULE | Refills: 1 | Status: SHIPPED | OUTPATIENT
Start: 2019-01-10 | End: 2019-03-03

## 2019-01-10 RX ORDER — PREDNISONE 10 MG/1
TABLET ORAL
Qty: 18 TAB | Refills: 0 | Status: SHIPPED | OUTPATIENT
Start: 2019-01-10 | End: 2019-03-15

## 2019-01-10 RX ADMIN — LORATADINE 10 MG: 10 TABLET ORAL at 09:41

## 2019-01-10 RX ADMIN — ASPIRIN 81 MG 81 MG: 81 TABLET ORAL at 09:41

## 2019-01-10 RX ADMIN — FUROSEMIDE 40 MG: 40 TABLET ORAL at 09:41

## 2019-01-10 RX ADMIN — FLUTICASONE FUROATE AND VILANTEROL TRIFENATATE 1 PUFF: 200; 25 POWDER RESPIRATORY (INHALATION) at 09:39

## 2019-01-10 RX ADMIN — PREDNISONE 40 MG: 20 TABLET ORAL at 09:41

## 2019-01-10 RX ADMIN — INSULIN LISPRO 2 UNITS: 100 INJECTION, SOLUTION INTRAVENOUS; SUBCUTANEOUS at 12:08

## 2019-01-10 RX ADMIN — Medication: at 09:00

## 2019-01-10 RX ADMIN — MICONAZOLE NITRATE: 20 CREAM TOPICAL at 09:00

## 2019-01-10 RX ADMIN — IPRATROPIUM BROMIDE AND ALBUTEROL SULFATE 3 ML: .5; 3 SOLUTION RESPIRATORY (INHALATION) at 14:14

## 2019-01-10 RX ADMIN — INSULIN GLARGINE 40 UNITS: 100 INJECTION, SOLUTION SUBCUTANEOUS at 09:46

## 2019-01-10 RX ADMIN — Medication 10 ML: at 03:50

## 2019-01-10 RX ADMIN — ENOXAPARIN SODIUM 40 MG: 40 INJECTION SUBCUTANEOUS at 09:40

## 2019-01-10 RX ADMIN — CYCLOBENZAPRINE HYDROCHLORIDE 5 MG: 10 TABLET, FILM COATED ORAL at 07:29

## 2019-01-10 RX ADMIN — METOPROLOL TARTRATE 25 MG: 25 TABLET ORAL at 09:41

## 2019-01-10 RX ADMIN — IPRATROPIUM BROMIDE AND ALBUTEROL SULFATE 3 ML: .5; 3 SOLUTION RESPIRATORY (INHALATION) at 07:36

## 2019-01-10 RX ADMIN — PERFLUTREN 2 ML: 6.52 INJECTION, SUSPENSION INTRAVENOUS at 09:09

## 2019-01-10 RX ADMIN — IPRATROPIUM BROMIDE AND ALBUTEROL SULFATE 3 ML: .5; 3 SOLUTION RESPIRATORY (INHALATION) at 01:42

## 2019-01-10 NOTE — DISCHARGE INSTRUCTIONS
Patient Discharge Instructions    Cozette Dance / [id number] : 1970    Admitted 2019 Discharged: 1/10/2019         DISCHARGE DIAGNOSIS:       Respiratory failure ([address] (2018)        What to do at Home    1. Recommended diet: Cardiac Diet    2. Recommended activity: Activity as tolerated    3. If you experience any of the following symptoms then please call your primary care physician or return to the emergency room if you cannot get hold of your doctor:   Fevers > 100.5, chills   Nausea or vomiting, persistent diarrhea > 24 hours   Blood in stool or black stools   Chest pain or SOB      Follow-up Information     Follow up With Specialties Details Why Contact Info    Olga Burr MD Sleep Medicine Go on 2019 Sleep Study follow up appointment for [address] [place] [address] at 2:30 p.m. [address]      Elvaston Pod, NP Nurse Practitioner Schedule an appointment as soon as possible for a visit in 1 week  [address] [place] [address]  [phone]                Information obtained by :  I understand that if any problems occur once I am at home I am to contact my physician. I understand and acknowledge receipt of the instructions indicated above.                                                                                                                                            Physician's or R.N.'s Signature                                                                  Date/Time                                                                                                                                              Patient or Representative Signature                                                          Date/Time

## 2019-01-10 NOTE — PROGRESS NOTES
Patient was asking to have her Flexeril. She states that at home she usually takes the Flexeril in the morning and at night. The order is for twice a day and she had a dose at 1800 but not in the AM. Kavita Hutchison, the charge nurse, and I explained to her that if she takes a dose now she will only be able to take it 1 more time today 1/10 and it's only 0100. She seemed confused and frustrated that her schedule is messed up. She then asked to have her Roxicodone, which is not ordered. She was upset about that as well saying she told the doctor in the ED that she takes Roxicodone at home. It may be better to have the Flexeril scheduled for AM and PM so she stays on her schedule and she would like to have Roxicodone available for break through pain.

## 2019-01-10 NOTE — PROGRESS NOTES
Transitional Care Plan: Pt is to discharge home today, transported by [facility] stretcher. Pt will resume home health services through the same agency she used prior to this hospitalization - [facility]. Pt will also have a trilogy machine replaced in her home [facility] Pt is to follow-up with her PCP (practice will call her tomorrow to schedule) and with Dr. Reta Ortiz on 1/16. Pt verbalized understanding of the plan. No concerns indicated at this time. PCS, Facesheet, and H&P placed on chart for transport. Transport arranged for 5:30pm via [facility] stretcher for generalized weakness from CHF. Referral was sent via [employer]. Care Management Interventions PCP Verified by CM: Yes Mode of Transport at Discharge: [facility] Hospital Transport Time of Discharge: [phone] Transition of Care Consult (CM Consult): Home Health([facility]) [address]: No 
Reason Outside [place]: Patient already serviced by other home care/hospice agency MyChart Signup: No 
Discharge Durable Medical Equipment: No 
Physical Therapy Consult: Yes Occupational Therapy Consult: No 
Speech Therapy Consult: No 
Current Support Network: Own Home Confirm Follow Up Transport: Other (see comment)(Medicaid) Plan discussed with Pt/Family/Caregiver: Yes Freedom of Choice Offered: Yes Discharge Location Discharge Placement: Home with home health([facility]) ROSCOE Rizvi, [facility] Care Management [phone]

## 2019-01-10 NOTE — PROGRESS NOTES
Received report from [address] at bedside. I asked about the CBC and she said no one told her about it needing to be re-collected. I told her and wrote a note about not being able to re-collect it. She then said she thought someone called PICC team to draw it. Will try harder on 1/10 to get all labs drawn.

## 2019-01-10 NOTE — PROGRESS NOTES
Problem: Falls - Risk of 
Goal: *Absence of Falls Document Conchita Parr Fall Risk and appropriate interventions in the flowsheet. Outcome: Progressing Towards Goal 
Fall Risk Interventions: 
Mobility Interventions: OT consult for ADLs, PT Consult for mobility concerns, Utilize walker, cane, or other assistive device Medication Interventions: Patient to call before getting OOB Elimination Interventions: Call light in reach, Patient to call for help with toileting needs History of Falls Interventions: Door open when patient unattended, Room close to nurse's station Problem: Pressure Injury - Risk of 
Goal: *Prevention of pressure injury Document Jeffrey Scale and appropriate interventions in the flowsheet. Outcome: Progressing Towards Goal 
Pressure Injury Interventions: 
Sensory Interventions: Assess need for specialty bed Moisture Interventions: Absorbent underpads, Apply protective barrier, creams and emollients, Assess need for specialty bed, Contain wound drainage, Maintain skin hydration (lotion/cream), Minimize layers, Moisture barrier Activity Interventions: Increase time out of bed Mobility Interventions: Assess need for specialty bed, PT/OT evaluation, Turn and reposition approx. every two hours(pillow and wedges) Nutrition Interventions: Document food/fluid/supplement intake Friction and Shear Interventions: Apply protective barrier, creams and emollients, Foam dressings/transparent film/skin sealants, Minimize layers

## 2019-01-10 NOTE — PROGRESS NOTES
Trilogy order given to [facility] with [employer] as well as the following documentation: Facesheet, H&P, Hospitalist notes, Respiratory notes, and ABG's. ROSCOE Cortes, [facility] Care Management [phone]

## 2019-01-10 NOTE — PROGRESS NOTES
Pt d/c home. I reviewed all d/c instructions, follow up appts, and medications. Pt stated understanding. Removed all IV/tele. Pt left with all personal belongings and Tramaine Nichols[facility] responsible for delivering pt trilolgy machine. Was inform on call deliver will be notified.

## 2019-01-10 NOTE — PROGRESS NOTES
Occupational Therapy Screening: 
Services are not indicated at this time. [facility] triggered for occupational therapy based on results obtained during the nursing admission assessment. The patients chart was reviewed and the patient is not appropriate for a skilled therapy evaluation at this time. Please consult occupational therapy if any therapy needs arise. Thank you.  
 
Alix Knox OTR/L

## 2019-01-10 NOTE — PROGRESS NOTES
Initial Nutrition Assessment: 
 
INTERVENTIONS/RECOMMENDATIONS:  
· Will add diabetic restrictions · Consider referral to [employer] outpatient nutrition counseling for weight loss, Dwight De León [phone] ASSESSMENT:  
Chart reviewed, medically noted for Acute on chronic hypoxemic hypercapnic respiratory failure, obesity hypoventilation syndrome, COPD and PMH shown below. Nutrition referral triggered due to wound healing needs. Pt has a DTI right heel however pt has a good appetite and eating well. No acute nutrition needs at this time. She would benefit from outpatient weight loss counseling. Past Medical History:  
Diagnosis Date  Arthritis  Asthma  Breast lump  CAD (coronary artery disease)  Congestive heart failure ([facility]  COPD (chronic obstructive pulmonary disease) ([facility])  Diabetes ([facility]  Hypertension  Morbid obesity with BMI of 70 and over, adult ([facility]  NSTEMI (non-ST elevated myocardial infarction) ([facility]  SVT (supraventricular tachycardia) ([facility]) Diet Order: Cardiac 
% Eaten:   
Patient Vitals for the past 72 hrs: 
 % Diet Eaten 01/10/19 1242 100 % 01/10/19 0914 100 % 01/09/19 1745 100 % 01/09/19 1250 100 % 01/09/19 0817 90 % Pertinent Medications: [x]Reviewed: lasix, lantus, humalog, prednisone Pertinent Labs: [x]Reviewed:  
Food Allergies: [x]NKFA  []Other Last BM: 1/8 Edema:        []RUE   []LUE   []RLE   []LLE Pressure Injury:  DTI (heel)    [] Stage I   [] Stage II   [] Stage III   [] Stage IV Wt Readings from Last 30 Encounters:  
01/10/19 (!) 183.3 kg (404 lb 1.7 oz) 01/06/19 (!) 184 kg (405 lb 11.2 oz) 10/26/18 (!) 185 kg (407 lb 13.6 oz)  
09/27/18 (!) 188.7 kg (416 lb)  
08/23/18 (!) 182.9 kg (403 lb 3 oz) 07/12/18 (!) 191.9 kg (423 lb 1 oz) 04/25/18 (!) 179.6 kg (396 lb) 02/01/18 (!) 180 kg (396 lb 13.3 oz) 01/26/18 (!) 177.4 kg (391 lb)  
01/23/18 (!) 177.9 kg (392 lb 2 oz) 11/26/17 (!) 201.9 kg (445 lb)  
09/27/17 (!) 185.5 kg (409 lb) 09/08/17 (!) 201.9 kg (445 lb)  
07/27/17 (!) 176.2 kg (388 lb 7.2 oz)  
06/25/17 (!) 201.9 kg (445 lb)  
06/16/17 (!) 201.9 kg (445 lb)  
06/11/17 (!) 201.9 kg (445 lb)  
01/14/17 (!) 201.9 kg (445 lb)  
11/25/16 (!) 201.9 kg (445 lb) 10/28/16 (!) 201.9 kg (445 lb)  
09/10/16 (!) 199.6 kg (440 lb) 09/07/16 (!) 199.6 kg (440 lb) 08/17/16 (!) 201.9 kg (445 lb) 04/08/16 (!) 201.9 kg (445 lb) 03/06/16 (!) 201.9 kg (445 lb 0 oz) 01/09/16 (!) 194.6 kg (429 lb)  
11/17/15 (!) 202.8 kg (447 lb)  
07/24/15 (!) 188.3 kg (415 lb 3.2 oz) 07/08/15 (!) 189.6 kg (418 lb) 05/25/15 (!) 189.7 kg (418 lb 3.4 oz) Anthropometrics:  
Height: 5' 6\" (167.6 cm) Weight: (!) 183.3 kg (404 lb 1.7 oz) IBW (%IBW):   ( ) UBW (%UBW):   (  %) Last Weight Metrics: 
Weight Loss Metrics 1/10/2019 1/7/2019 1/6/2019 10/26/2018 9/27/2018 8/23/2018 7/12/2018 Today's Wt 404 lb 1.7 oz - 405 lb 11.2 oz 407 lb 13.6 oz 416 lb 403 lb 3 oz 423 lb 1 oz BMI - 65.22 kg/m2 65.48 kg/m2 63.88 kg/m2 65.15 kg/m2 65.08 kg/m2 68.28 kg/m2 BMI: Body mass index is 65.22 kg/m². This BMI is indicative of: 
 []Underweight    []Normal    []Overweight    [] Obesity   [x] Extreme Obesity (BMI>40) Estimated Nutrition Needs (Based on):  
1800 Kcals/day(20 kcal/kg adj BW) , 145 g(0.8 g/kg) Protein Carbohydrate: At Least 130 g/day  Fluids: 1800 mL/day (1ml/kcal) or per primary team 
 
NUTRITION DIAGNOSES:  
Problem:  Overweight/obesity Etiology: related to chronic excess kcal intake Signs/Symptoms: as evidenced by BMI of 65 NUTRITION INTERVENTIONS: 
              [facility] counselingSelene [phone] GOAL:  
consider outpatient weight loss counseling LEARNING NEEDS (Diet, Food/Nutrient-Drug Interaction):  
 [x] None Identified 
 [] Identified and Education Provided/Documented [] Identified and Pt declined/was not appropriate Cultureal, Hinduism, OR Ethnic Dietary Needs:  
 [x] None Identified 
 [] Identified and Addressed 
 
 [x] Interdisciplinary Care Plan Reviewed/Documented  
 [x] Discharge Planning:  Consider referral to [facility] outpatient nutrition counseling, Leonid Melina [phone] MONITORING Valarie Fabby Mcallister Outcomes: Readiness to change, Food/nutrition knowledge Physical Signs/Symptoms Outcomes: Weight/weight change, Electrolyte and renal profile, Glucose profile, GI 
 
NUTRITION RISK:  
 [] High              [] Moderate           []  Low  [x]  Minimal/Uncompromised PT SEEN FOR:  
 []  MD Consult: []Calorie Count []Diabetic Diet Education []Diet Education []Electrolyte Management []General Nutrition Management and Supplements []Management of Tube Feeding []TPN Recommendations [x]  RN Referral:  []MST score >=2 
   []Enteral/Parenteral Nutrition PTA []Pregnant: Gestational DM or Multigestation [x]Pressure Ulcer/Wound Care needs 
     
[]  Low BMI 
[]  LOS Referral  
 
 
Nadine Martinez RDN Pager [phone] Weekend Pager [phone]

## 2019-01-10 NOTE — PROGRESS NOTES
ADULT PROTOCOL: JET AEROSOL  REASSESSMENT Patient  Patricia Bains     57 y.o.   female     1/10/2019  10:09 AM 
 
Breath Sounds Pre Procedure: Right Breath Sounds: Diminished Left Breath Sounds: Diminished Breath Sounds Post Procedure: Right Breath Sounds: Diminished Left Breath Sounds: Diminished Breathing pattern: Pre procedure Breathing Pattern: Regular Post procedure Breathing Pattern: Regular Heart Rate: Pre procedure Pulse: 72 Post procedure Pulse: 86 Resp Rate: Pre procedure Respirations: 20 
         Post procedure Respirations: 20 
 
     
 
Cough: Pre procedure Cough: Non-productive Post procedure Oxygen: O2 Device: Nasal cannula   Flow rate (L/min) 5 Changed: No  
 
 
SpO2: Pre procedure SpO2: 97 %    5LPM NC Post procedure SpO2: 94 %  5LPM NC Nebulizer Therapy: Current medications Aerosolized Medications: DuoNeb Q6 Resp Changed: No  
 
 
 
Smoking History:  
Smoking status: Former Smoker   
    Packs/day: 0.25  
    Types: Cigarettes Problem List:  
Patient Active Problem List  
Diagnosis Code  CHF (congestive heart failure) ([facility]) I50.9  Malignant essential hypertension I10  
 Asthma J45.909  Obesity hypoventilation syndrome ([facility]) E66.2  Dyspnea R06.00  Chest pressure R07.89  
 Acute on chronic diastolic heart failure ([facility]) I50.33  
 SVT (supraventricular tachycardia) ([facility]) I47.1  NSTEMI (non-ST elevated myocardial infarction) ([facility]) I21.4  
 S/P PTCA (percutaneous transluminal coronary angioplasty) Z98.61  
 Coronary atherosclerosis of native coronary artery I25.10  Hypoxia R09.02  
 Noncompliance with therapeutic plan Z91.11  
 Chest pain R07.9  GERD (gastroesophageal reflux disease) K21.9  Acute respiratory failure with hypoxia and hypercarbia ([facility]) J96.01, J96.02  
  COPD (chronic obstructive pulmonary disease) ([facility] J44.9  Tobacco abuse Z72.0  Obesity, morbid (more than 100 lbs over ideal weight or BMI > 40) ([facility]) E66.01  
 Cellulitis L03.90  
 SIRS due to infectious process with acute organ dysfunction ([facility]) A41.9, R65.20  Sepsis associated hypotension ([facility]) A41.9, I95.9  Gangrenous toe ([facility] [id number]  Type II diabetes mellitus, uncontrolled ([facility]) E11.65  
 HTN (hypertension) Q11  
 Diastolic CHF, chronic ([facility]) I50.32  
 Cough with hemoptysis R04.2  Lymphedema of both lower extremities I89.0  
 CAP (community acquired pneumonia) J18.9  Cellulitis, leg L03.119  
 COPD exacerbation ([facility]) J44.1  Maggot infestation B87.9  Shortness of breath R06.02  
 Multifocal pneumonia J18.9  Acute respiratory failure ([facility]) J96.00  
 Acute on chronic respiratory failure with hypoxia and hypercapnia ([facility]) J96.21, J96.22  
 NSVT (nonsustained ventricular tachycardia) ([facility]) I47.2  Res[facility] failure [facility] J96.90  Elevated lipase R74.8  Abdominal pain R10.9  Counseling regarding goals of care Z71.89  
 Acute pancreatitis K85.90  Breast cancer ([facility] C50.919  Intertrigo L30.4  PNA (pneumonia) J18.9  Productive cough R05  
 Other fatigue R53.83  
 Acute chest pain R07.9 Respiratory Therapist: Alfonzo Cedillo, RT

## 2019-01-10 NOTE — DISCHARGE SUMMARY
Hospitalist Discharge Note    NAME: Jeorme Sellers   :  1970   MRN:  [id number]     Admit date: 2019    Discharge date: 01/10/19    PCP: Indira Meza NP    Discharge Diagnoses:    Acute on chronic respiratory failure with hypoxia and hypercarbia POA    Obesity hypoventilation syndrome POA    ? COPD POA    Chest pain POA non cardiac    DM type 2 POA    Essential hypertension POA    Morbid obesity POA Body mass index is 65.22 kg/m².     Code Status: full         Discharge Medications:  Current Discharge Medication List      CONTINUE these medications which have CHANGED    Details   predniSONE (DELTASONE) 10 mg tablet 3 tablets daily for 3 days, then 2 tablets daily for 3 days, then 1 tablet daily for 3 days. Qty: 18 Tab, Refills: 0      albuterol-ipratropium (DUO-NEB) 2.5 mg-0.5 mg/3 ml nebu 3 mL by Nebulization route four (4) times daily. Qty: 100 Nebule, Refills: 1         CONTINUE these medications which have NOT CHANGED    Details   furosemide (LASIX) 40 mg tablet Take 40 mg by mouth two (2) times a day. naproxen sodium (ALEVE) 220 mg tablet Take 440 mg by mouth every twelve (12) hours as needed for Pain. nystatin (MYCOSTATIN) topical cream Apply  to affected area three (3) times daily as needed for Skin Irritation. nystatin (MYCOSTATIN) powder Apply  to affected area three (3) times daily as needed (skin irratation). fluticasone (FLONASE) 50 mcg/actuation nasal spray 2 Sprays by Both Nostrils route daily. Qty: 1 Bottle, Refills: 0      insulin glargine (LANTUS) 100 unit/mL injection 40 Units by SubCUTAneous route daily. metoprolol tartrate (LOPRESSOR) 25 mg tablet Take 25 mg by mouth two (2) times a day. fluticasone-salmeterol (ADVAIR DISKUS) 500-50 mcg/dose diskus inhaler Take 1 Puff by inhalation every twelve (12) hours. loratadine (CLARITIN) 10 mg tablet Take 10 mg by mouth daily.       albuterol (VENTOLIN HFA) 90 mcg/actuation inhaler Take 2 Puffs by inhalation every six (6) hours as needed for Wheezing. aspirin 81 mg chewable tablet Take 81 mg by mouth daily. hydrOXYzine pamoate (VISTARIL) 50 mg capsule Take 50 mg by mouth every eight (8) hours as needed for Itching. lovastatin (MEVACOR) 40 mg tablet Take 1 Tab by mouth nightly. Qty: 30 Tab, Refills: 6    Associated Diagnoses: Atherosclerosis of native coronary artery without angina pectoris      glyBURIDE (DIABETA) 5 mg tablet Take 5 mg by mouth two (2) times daily (with meals). ammonium lactate (LAC-HYDRIN) 12 % topical cream rub in to affected area well  Qty: 280 g, Refills: 1      cyclobenzaprine (FLEXERIL) 5 mg tablet Take 5 mg by mouth two (2) times a day. flexeriol       nitroglycerin (NITROSTAT) 0.4 mg SL tablet 1 Tab by SubLINGual route every five (5) minutes as needed for Chest Pain (call 911 if not relieved by 3). Qty: 25 Tab, Refills: 2    Associated Diagnoses: SVT (supraventricular tachycardia) (H[facility]); CHF (congestive heart failure) ([facility])         STOP taking these medications       oxyCODONE IR (ROXICODONE) 5 mg immediate release tablet Comments:   Reason for Stopping: Follow-up Information     Follow up With Specialties Details Why Mona Mejia MD Sleep Medicine Go on 1/16/2019 Sleep Study follow up appointment for [address] [place] [address] at 2:30 p.m. [address] [place] [address]  [address]      Bri Katz NP Nurse Practitioner  Dr. Fatou Carrasco office will call you tomorrow to schedule a hospital follow-up appointment.  [facility]  [id number] [place] [address]  [phone]            Time spent on discharge:   I spent greater than 30 minutes on discharge, seeing and examining the patient, reconciling home meds and new meds, coordinating care with case management, doing the discharge papers and the D/C summary    Discharge disposition: home resuming prior home health    Discharge Condition: Stable    Summary of admission H+P(copied from Dr Guille Aquino Note):     CHIEF COMPLAINT: SOB and chest pain      HISTORY OF PRESENT ILLNESS:     57years old female from home with past medical history significant for asthma, CHF, morbid obesity, coronary artery disease, SVT, DM presented to the hospital today complaining from worsening shortness of breath associated with chest pain started today patient was recently discharged from [place] [address] 2 days ago and was admitted for same complaint, patient stated her baseline she is on 6 L nasal cannula at home that she cannot tolerate 6 L because she feels like it burning into CTA chest was done and show  No PE .     We were asked to admit for work up and evaluation of the above problems.           Past Medical History:   Diagnosis Date    Arthritis      Asthma      Breast lump      CAD (coronary artery disease)      Congestive heart failure ([address]      COPD (chronic obstructive pulmonary disease) ([address]      Diabetes ([address]      Hypertension      Morbid obesity with BMI of 70 and over, adult ([address]      NSTEMI (non-ST elevated myocardial infarction) ([address]      SVT (supraventricular tachycardia) ([address]        CTA chest read by radiology FINDINGS:   This is a adequate quality study for the evaluation of pulmonary embolism to the first subsegmental arterial level. There is no pulmonary embolism to this level. Main pulmonary artery is mildly enlarged. THYROID: No nodule. MEDIASTINUM: No mass or lymphadenopathy. JAZMIN: No mass or lymphadenopathy. THORACIC AORTA: Atherosclerosis without aneurysm. HEART: Mild cardiomegaly. No effusion. Coronary artery calcific atherosclerosis  is visible. ESOPHAGUS: No wall thickening or dilatation. TRACHEA/BRONCHI: Patent. PLEURA: No effusion or pneumothorax. LUNGS: Subtle interstitial nodules are in the right upper lobe. Right middle  lobe interstitial nodules have resolved. No evidence of emphysema.  No lobar  pneumonia or pulmonary edema. UPPER ABDOMEN: Partially imaged. No acute pathology. BONES: No aggressive bone lesion or fractures  IMPRESSION:   1. No pulmonary embolism. 2. Subtle right upper lobe interstitial pneumonia. Resolution of right middle  lobe interstitial pneumonia. 3. No CT evidence of emphysema. 4. Cardiomegaly, coronary artery disease, and possible pulmonary hypertension. Hospital course:     Acute on chronic respiratory failure with hypoxia and hypercarbia POA  Obesity hypoventilation syndrome POA  ? COPD POA  Nebs, oral prednisone, advair  BIPAP at night  PT/OT consults  CTA chest no PE, No pulmonary embolism. Subtle right upper lobe interstitial pneumonia. Resolution of right middle lobe interstitial pneumonia. No CT evidence of emphysema. pBNP 242, wean lasix  CM set up home triligy and appointment to follow up with sleep doctor  D/C to home    Chest pain POA  serial troponin negative  No PE  Echo severely limited, essentially normal    DM type 2 POA  Resume home regimen at discharge  SSI    Essential hypertension POA  Continue home antihypertensive medication    Morbid obesity POA Body mass index is 65.22 kg/m².     Code Status: full   Surrogate Decision Maker:SON      DVT Prophylaxis: Lovenox   GI Prophylaxis: not indicated       Subjective:     Chief Complaint / Reason for Physician Visit f/u acute on chronic respiratory failure  \"breathing is okay\". Discussed with RN events overnight.    Breathing better, not SOB and no further CP  Seen off bIPAP, wore it overnight  CM set up home triligy, will resume prior home health    Review of Systems:  Symptom Y/N Comments  Symptom Y/N Comments   Fever/Chills n   Chest Pain n    Poor Appetite    Edema     Cough n   Abdominal Pain n    Sputum    Joint Pain     SOB/RAZO less   Headache     Nausea/vomit n   Tolerating PT/OT     Diarrhea n   Tolerating Diet y    Constipation    Other       Could NOT obtain due to: Objective:     VITALS:   Last 24hrs VS reviewed since prior progress note. Most recent are:  Patient Vitals for the past 24 hrs:   Temp Pulse Resp BP SpO2   01/10/19 1645 97.6 °F (36.4 °C) 72 20 133/81 97 %   01/10/19 1430 98.2 °F (36.8 °C) 71 20 130/65 96 %   01/10/19 1414     93 %   01/10/19 1041 97.8 °F (36.6 °C) 86 18 99/55 95 %   01/10/19 0947    106/80    01/10/19 0736     97 %   01/10/19 0707 97.6 °F (36.4 °C) 71 20 100/53 98 %   01/10/19 0341 98 °F (36.7 °C) 77 22 112/61 99 %   01/10/19 0156     98 %   01/10/19 0142     98 %   01/09/19 2305 97.9 °F (36.6 °C) 69 20 119/64 94 %   01/09/19 1959     97 %   01/09/19 1946 97.9 °F (36.6 °C) 78 22 134/86 90 %       Intake/Output Summary (Last 24 hours) at 1/10/2019 1652  Last data filed at 1/10/2019 1607  Gross per 24 hour   Intake 1900 ml   Output 1850 ml   Net 50 ml        Wt Readings from Last 12 Encounters:   01/10/19 (!) 183.3 kg (404 lb 1.7 oz)   01/06/19 (!) 184 kg (405 lb 11.2 oz)   10/26/18 (!) 185 kg (407 lb 13.6 oz)   09/27/18 (!) 188.7 kg (416 lb)   08/23/18 (!) 182.9 kg (403 lb 3 oz)   07/12/18 (!) 191.9 kg (423 lb 1 oz)   04/25/18 (!) 179.6 kg (396 lb)   02/01/18 (!) 180 kg (396 lb 13.3 oz)   01/26/18 (!) 177.4 kg (391 lb)   01/23/18 (!) 177.9 kg (392 lb 2 oz)   11/26/17 (!) 201.9 kg (445 lb)   09/27/17 (!) 185.5 kg (409 lb)       PHYSICAL EXAM:  General: WD, WN. Alert, cooperative, no acute distress off BIPAP  EENT:  PERRL. Anicteric sclerae. MMM  Resp:  Decreased BS  bilaterally, no wheezing or rales. No accessory muscle use  CV:  Regular  rhythm,  No edema  GI:  Soft, Non distended, Non tender.  +Bowel sounds, no rebound  Neurologic:  Alert and oriented X 3, normal speech, non focal motor exam  Psych:   Not anxious nor agitated  Skin:  No rashes.   No jaundice    Reviewed most current lab test results and cultures  YES  Reviewed most current radiology test results   YES  Review and summation of old records today NO  Reviewed patient's current orders and MAR    YES  PMH/SH reviewed - no change compared to H&P  ________________________________________________________________________  Care Plan discussed with:    Comments   Patient x    Family      RN x    Care Manager x    Consultant                        Multidiciplinary team rounds were held today with , nursing, pharmacist and clinical coordinator. Patient's plan of care was discussed; medications were reviewed and discharge planning was addressed. ________________________________________________________________________      Comments   >50% of visit spent in counseling and coordination of care     ________________________________________________________________________  Mark Bay MD     Procedures: see electronic medical records for all procedures/Xrays and details which were not copied into this note but were reviewed prior to creation of Plan. LABS:  I reviewed today's most current labs and imaging studies.   Pertinent labs include:  Recent Labs     01/10/19  0116 01/08/19  0746 01/08/19  0030   WBC 11.5* 10.0 8.5   HGB 11.5 11.7 11.5   HCT 37.3 38.4 37.9    181 193     Recent Labs     01/10/19  0116 01/09/19  0555 01/08/19  0732    136 135*   K 4.0 4.2 4.6    99 99   CO2 31 30 30   * 202* 179*   BUN 19 12 13   CREA 0.82 0.85 0.80   CA 10.0 10.3* 10.7*

## 2019-01-16 ENCOUNTER — HOSPITAL ENCOUNTER (OUTPATIENT)
Dept: SLEEP MEDICINE | Age: 49
Discharge: HOME | End: 2019-01-16
Payer: MEDICARE

## 2019-01-16 ENCOUNTER — OFFICE VISIT (OUTPATIENT)
Dept: SLEEP MEDICINE | Age: 49
End: 2019-01-16

## 2019-01-16 VITALS
HEIGHT: 66 IN | WEIGHT: 293 LBS | BODY MASS INDEX: 47.09 KG/M2 | HEART RATE: 84 BPM | OXYGEN SATURATION: 93 % | DIASTOLIC BLOOD PRESSURE: 75 MMHG | SYSTOLIC BLOOD PRESSURE: 118 MMHG

## 2019-01-16 DIAGNOSIS — G47.33 OSA (OBSTRUCTIVE SLEEP APNEA): Primary | ICD-10-CM

## 2019-01-16 DIAGNOSIS — Z78.9 ON SUPPLEMENTAL OXYGEN BY NASAL CANNULA: ICD-10-CM

## 2019-01-16 DIAGNOSIS — E66.2 HYPOVENTILATION ASSOCIATED WITH OBESITY (HCC): ICD-10-CM

## 2019-01-16 PROCEDURE — 95806 SLEEP STUDY UNATT&RESP EFFT: CPT | Performed by: INTERNAL MEDICINE

## 2019-01-16 NOTE — PROGRESS NOTES
[address] [place], [address]  Tel.  [phone]  Fax. 100 [facility] 60  [place], [address] [facility]  Tel.  [phone]  Fax. [phone] [address] [facility] [place]   Tel.  [phone]  Fax. [phone]         Subjective:      Meron Oropeza is an 57 y.o. female referred for evaluation for a sleep disorder. She complains of awakening in the middle of the night because of SOB associated with feeling tired on waking. Symptoms began several years ago, she was recently hospitalized due to inability to maintain her oxygen level and discharged after 5 days initially but re-hospitalized after 1 day for another 4 days. She has been on an new PAP device since that time. Device not available for use. She reports of using 5 LPM of Oxygen when awake and asleep. She usually can fall asleep in 60 - 120 minutes after viewing television in bed. Family or house members do not note snoring on PAP therapy. She denies completely or partially paralyzed while falling asleep or waking up. Meron Oropeza does wake up frequently at night. She is bothered by waking up too early and left unable to get back to sleep. She actually sleeps about 8 hours at night and wakes up about 1 times during the night. She does not work shifts:  . Yahaira indicates she does get too little sleep at night. Her bedtime is 0000. She awakens at 0800. She does take naps. She takes 7 naps a week lasting 1, Hour(s). She has the following observed behaviors: Loud snoring, Light snoring, Bedwetting, Sleep talking, Pauses in breathing;   [place] Sleepiness Score: 8 which reflect mild daytime drowsiness. No Known Allergies      Current Outpatient Medications:     albuterol-ipratropium (DUO-NEB) 2.5 mg-0.5 mg/3 ml nebu, 3 mL by Nebulization route four (4) times daily. , Disp: 100 Nebule, Rfl: 1    furosemide (LASIX) 40 mg tablet, Take 40 mg by mouth two (2) times a day., Disp: , Rfl:   naproxen sodium (ALEVE) 220 mg tablet, Take 440 mg by mouth every twelve (12) hours as needed for Pain., Disp: , Rfl:     nystatin (MYCOSTATIN) topical cream, Apply  to affected area three (3) times daily as needed for Skin Irritation. , Disp: , Rfl:     nystatin (MYCOSTATIN) powder, Apply  to affected area three (3) times daily as needed (skin irratation). , Disp: , Rfl:     insulin glargine (LANTUS) 100 unit/mL injection, 40 Units by SubCUTAneous route daily. , Disp: , Rfl:     metoprolol tartrate (LOPRESSOR) 25 mg tablet, Take 25 mg by mouth two (2) times a day., Disp: , Rfl:     fluticasone-salmeterol (ADVAIR DISKUS) 500-50 mcg/dose diskus inhaler, Take 1 Puff by inhalation every twelve (12) hours. , Disp: , Rfl:     loratadine (CLARITIN) 10 mg tablet, Take 10 mg by mouth daily. , Disp: , Rfl:     albuterol (VENTOLIN HFA) 90 mcg/actuation inhaler, Take 2 Puffs by inhalation every six (6) hours as needed for Wheezing., Disp: , Rfl:     cyclobenzaprine (FLEXERIL) 5 mg tablet, Take 5 mg by mouth two (2) times a day. flexeriol , Disp: , Rfl:     aspirin 81 mg chewable tablet, Take 81 mg by mouth daily. , Disp: , Rfl:     hydrOXYzine pamoate (VISTARIL) 50 mg capsule, Take 50 mg by mouth every eight (8) hours as needed for Itching., Disp: , Rfl:     lovastatin (MEVACOR) 40 mg tablet, Take 1 Tab by mouth nightly., Disp: 30 Tab, Rfl: 6    glyBURIDE (DIABETA) 5 mg tablet, Take 5 mg by mouth two (2) times daily (with meals). , Disp: , Rfl:     ammonium lactate (LAC-HYDRIN) 12 % topical cream, rub in to affected area well, Disp: 280 g, Rfl: 1    nitroglycerin (NITROSTAT) 0.4 mg SL tablet, 1 Tab by SubLINGual route every five (5) minutes as needed for Chest Pain (call 911 if not relieved by 3). , Disp: 25 Tab, Rfl: 2    predniSONE (DELTASONE) 10 mg tablet, 3 tablets daily for 3 days, then 2 tablets daily for 3 days, then 1 tablet daily for 3 days. , Disp: 18 Tab, Rfl: 0    fluticasone (FLONASE) 50 mcg/actuation nasal spray, 2 Sprays by Both Nostrils route daily. , Disp: 1 Bottle, Rfl: 0     She  has a past medical history of Arthritis, Asthma, Breast lump, CAD (coronary artery disease), Congestive heart failure ([facility] COPD (chronic obstructive pulmonary disease) ([facility] Diabetes ([facility] Hypertension, Morbid obesity with BMI of 70 and over, adult [facility]), NSTEMI (non-ST elevated myocardial infarction) ([facility] and SVT (supraventricular tachycardia) ([facility].). She  has a past surgical history that includes hx orthopaedic; hx other surgical; hx  section; and pr cardiac surg procedure unlist.    She family history includes Breast Cancer in her maternal grandmother and paternal grandmother; Heart Disease in her father, mother, and sister. She  reports that she has quit smoking. Her smoking use included cigarettes. She smoked 0.25 packs per day. she has never used smokeless tobacco. She reports that she does not drink alcohol or use drugs. Review of Systems:  Constitutional: significant weight loss - 25 lbs in past 2 weeks  Eyes:  No blurred vision  CVS:  No significant chest pain  Pulm:  No significant shortness of breath  GI:  No significant nausea or vomiting  :   significant nocturia  Musculoskeletal:  No significant joint pain at night  Skin:  No significant rashes  Neuro:  No significant dizziness   Psych:  No active mood issues    Sleep Review of Systems: notable for difficulty falling asleep; frequent awakenings at night;  regular dreaming noted; no nightmares ; no early morning headaches; no memory problems; no concentration issues; no history of any automobile or occupational accidents due to daytime drowsiness.       Objective:     Visit Vitals  /75 (BP 1 Location: Left arm, BP Patient Position: Sitting)   Pulse 84   Ht 5' 6\" (1.676 m)   Wt (!) 416 lb (188.7 kg)   SpO2 93%   BMI 67.14 kg/m²         General:   Not in acute distress   Eyes:  Anicteric sclerae, no obvious strabismus   Nose:  No obvious nasal septum deviation    Oropharynx:   Class 4 oropharyngeal outlet, thick tongue base, uvula could not be seen due to low-lying soft palate, narrow tonsilo-pharyngeal pilars   Tonsils:   tonsils are not seen due to low-lying soft palate   Neck:   Neck circ. in \"inches\": 23; midline trachea   Chest/Lungs:  Equal lung expansion, clear on auscultation    CVS:  Normal rate, regular rhythm; no JVD   Skin:  Warm to touch; no obvious rashes   Neuro:  No focal deficits ; no obvious tremor    Psych:  Normal affect,  normal countenance;          Assessment:       ICD-10-CM ICD-9-CM    1. GEN (obstructive sleep apnea) G47.33 327.23 SLEEP STUDY UNATTENDED, 4 CHANNEL   2. Hypoventilation associated with obesity (HCC) E66.2 278.03    3. On supplemental oxygen by nasal cannula Z78.9 V49.89    4. BMI 60.0-69.9, adult [facility]) Z68.44 V85.44          Plan:     * The patient currently has a High Risk for having sleep apnea. STOP-BANG score 6.  * Baseline HSAT to be done to assess for GEN. Patient agrees to send device that is being used currently for download when HSAT is returned. * Sleep testing was ordered for evaluation of therapy, order to be placed after review of download. * She was provided information on sleep apnea including coresponding risk factors and the importance of proper treatment. * Counseling was provided regarding proper sleep hygiene (including effect of light on sleep), paradoxical intention, stimulus control, sleep environment safety and safe driving. * Effect of sleep disturbance on weight was reviewed. We have recommended a dedicated weight loss through appropriate diet and an exercise regiment as significant weight reduction has been shown to reduce severity of obstructive sleep apnea.      * Patient agrees to telephone [phone]  follow-up by myself or lead sleep technologist shortly after sleep study to review results and plan final management.     (patient has given permission for a message to be left regarding test results and further management if patient cannot be cannot be reached directly). Thank you for allowing us to participate in your patient's medical care. We'll keep you updated on these investigations. Darwin Avalos MD, [facility]  Electronically signed.  01/16/19

## 2019-01-16 NOTE — PATIENT INSTRUCTIONS
[address] [place], [address]  Tel.  [phone]  Fax. 100 [facility] 60  [place], [address] [facility]  Tel.  [phone]  Fax. [phone] [facility]  [place]  Tel.  [phone]  Fax. [phone]     Learning About CPAP for Sleep Apnea  What is CPAP? CPAP is a small machine that you use at home every night while you sleep. It increases air pressure in your throat to keep your airway open. When you have sleep apnea, this can help you sleep better so you feel much better. CPAP stands for \"continuous positive airway pressure. \"  The CPAP machine will have one of the following:  · A mask that covers your nose and mouth  · Prongs that fit into your nose  · A mask that covers your nose only, the most common type. This type is called NCPAP. The N stands for \"nasal.\"  Why is it done? CPAP is usually the best treatment for obstructive sleep apnea. It is the first treatment choice and the most widely used. Your doctor may suggest CPAP if you have:  · Moderate to severe sleep apnea. · Sleep apnea and coronary artery disease (CAD) or heart failure. How does it help? · CPAP can help you have more normal sleep, so you feel less sleepy and more alert during the daytime. · CPAP may help keep heart failure or other heart problems from getting worse. · NCPAP may help lower your blood pressure. · If you use CPAP, your bed partner may also sleep better because you are not snoring or restless. What are the side effects? Some people who use CPAP have:  · A dry or stuffy nose and a sore throat. · Irritated skin on the face. · Sore eyes. · Bloating. If you have any of these problems, work with your doctor to fix them. Here are some things you can try:  · Be sure the mask or nasal prongs fit well. · See if your doctor can adjust the pressure of your CPAP. · If your nose is dry, try a humidifier.   · If your nose is runny or stuffy, try decongestant medicine or a steroid nasal spray. If these things do not help, you might try a different type of machine. Some machines have air pressure that adjusts on its own. Others have air pressures that are different when you breathe in than when you breathe out. This may reduce discomfort caused by too much pressure in your nose. Where can you learn more? Go to [employer].be  Enter Riley Perea in the search box to learn more about \"Learning About CPAP for Sleep Apnea. \"   © 3708-4599 Healthwise, Incorporated. Care instructions adapted under license by [facility] (which disclaims liability or warranty for this information). This care instruction is for use with your licensed healthcare professional. If you have questions about a medical condition or this instruction, always ask your healthcare professional. [address] any warranty or liability for your use of this information. Content Version: 0.2.27096; Last Revised: January 11, 2010  PROPER SLEEP HYGIENE    What to avoid  · Do not have drinks with caffeine, such as coffee or black tea, for 8 hours before bed. · Do not smoke or use other types of tobacco near bedtime. Nicotine is a stimulant and can keep you awake. · Avoid drinking alcohol late in the evening, because it can cause you to wake in the middle of the night. · Do not eat a big meal close to bedtime. If you are hungry, eat a light snack. · Do not drink a lot of water close to bedtime, because the need to urinate may wake you up during the night. · Do not read or watch TV in bed. Use the bed only for sleeping and sexual activity. What to try  · Go to bed at the same time every night, and wake up at the same time every morning. Do not take naps during the day. · Keep your bedroom quiet, dark, and cool. · Get regular exercise, but not within 3 to 4 hours of your bedtime. .  · Sleep on a comfortable pillow and mattress.   · If watching the clock makes you anxious, turn it facing away from you so you cannot see the time. · If you worry when you lie down, start a worry book. Well before bedtime, write down your worries, and then set the book and your concerns aside. · Try meditation or other relaxation techniques before you go to bed. · If you cannot fall asleep, get up and go to another room until you feel sleepy. Do something relaxing. Repeat your bedtime routine before you go to bed again. · Make your house quiet and calm about an hour before bedtime. Turn down the lights, turn off the TV, log off the computer, and turn down the volume on music. This can help you relax after a busy day. Drowsy Driving: The [facility] 54 cites drowsiness as a causing factor in more than 257,184 police reported crashes annually, resulting in 76,000 injuries and 1,500 deaths. Other surveys suggest 55% of people polled have driven while drowsy in the past year, 23% had fallen asleep but not crashed, 3% crashed, and 2% had and accident due to drowsy driving. Who is at risk? Young Drivers: One study of drowsy driving accidents states that 55% of the drivers were under 25 years. Of those, 75% were male. Shift Workers and Travelers: People who work overnight or travel across time zones frequently are at higher risk of experiencing Circadian Rhythm Disorders. They are trying to work and function when their body is programed to sleep. Sleep Deprived: Lack of sleep has a serious impact on your ability to pay attention or focus on a task. Consistently getting less than the average of 8 hours your body needs creates partial or cumulative sleep deprivation. Untreated Sleep Disorders: Sleep Apnea, Narcolepsy, R.L.S., and other sleep disorders (untreated) prevent a person from getting enough restful sleep. This leads to excessive daytime sleepiness and increases the risk for drowsy driving accidents by up to 7 times.   Medications / Alcohol: Even over the counter medications can cause drowsiness. Medications that impair a drivers attention should have a warning label. Alcohol naturally makes you sleepy and on its own can cause accidents. Combined with excessive drowsiness its effects are amplified. Signs of Drowsy Driving:   * You don't remember driving the last few miles   * You may drift out of your logan   * You are unable to focus and your thoughts wander   * You may yawn more often than normal   * You have difficulty keeping your eyes open / nodding off   * Missing traffic signs, speeding, or tailgating  Prevention-   Good sleep hygiene, lifestyle and behavioral choices have the most impact on drowsy driving. There is no substitute for sleep and the average person requires 8 hours nightly. If you find yourself driving drowsy, stop and sleep. Consider the sleep hygiene tips provided during your visit as well. Medication Refill Policy: Refills for all medications require 1 week advance notice. Please have your pharmacy fax a refill request. We are unable to fax, or call in \"controled substance\" medications and you will need to pick these prescriptions up from our office. Square Activation    Thank you for requesting access to Square. Please follow the instructions below to securely access and download your online medical record. Square allows you to send messages to your doctor, view your test results, renew your prescriptions, schedule appointments, and more. How Do I Sign Up? 1. In your internet browser, go to [email or web address]. 2. Click on the First Time User? Click Here link in the Sign In box. You will see the New Member Sign Up page. 3. Enter your Square Access Code exactly as it appears below. You will not need to use this code after youve completed the sign-up process. If you do not sign up before the expiration date, you must request a new code.     Square Access Code: [email or web address]  Expires: 3/2/2019  3:11 PM (This is the date your [email or web address] access code will )    4. Enter the last four digits of your Social Security Number (xxxx) and Date of Birth (mm/dd/yyyy) as indicated and click Submit. You will be taken to the next sign-up page. 5. Create a [email or web address] ID. This will be your [email or web address] login ID and cannot be changed, so think of one that is secure and easy to remember. 6. Create a [email or web address] password. You can change your password at any time. 7. Enter your Password Reset Question and Answer. This can be used at a later time if you forget your password. 8. Enter your e-mail address. You will receive e-mail notification when new information is available in [address] 9. Click Sign Up. You can now view and download portions of your medical record. 10. Click the Download Summary menu link to download a portable copy of your medical information. Additional Information    If you have questions, please call [phone]. Remember, [email or web address] is NOT to be used for urgent needs. For medical emergencies, dial 911.

## 2019-01-17 ENCOUNTER — TELEPHONE (OUTPATIENT)
Dept: SLEEP MEDICINE | Age: 49
End: 2019-01-17

## 2019-01-17 ENCOUNTER — DOCUMENTATION ONLY (OUTPATIENT)
Dept: SLEEP MEDICINE | Age: 49
End: 2019-01-17

## 2019-01-17 NOTE — TELEPHONE ENCOUNTER
Patricia Bains is to be contacted by lead sleep technologist regarding results of Sleep Testing which was indicative of an average AHI of 6.1 per hour with an SpO2 nasreen of 75% and SpO2 of < 88% being 39 minutes. Patient to return for previously schedule PAP Titration.

## 2019-01-17 NOTE — TELEPHONE ENCOUNTER
HSAT Returned-[facility]    Date of Study: 1/16/19    The following information was gathered from the patients study log:      Further information provided: Log scanned into media

## 2019-01-17 NOTE — PROGRESS NOTES
Patient brought machine into office for download- machine was an Curriculet device and we were unable to download data. I called into [employer] and asked them to send us a copy of download and they agreed. Patient was set up on 1/11/19 and should be following up with their office in 2 weeks.

## 2019-02-20 ENCOUNTER — TELEPHONE (OUTPATIENT)
Dept: CASE MANAGEMENT | Age: 49
End: 2019-02-20

## 2019-02-20 NOTE — TELEPHONE ENCOUNTER
ONCOLOGY NURSE NAVIGATOR    TC from Romero Rizvi nurse, stating pt has appt 2/25/19. TC to [address] [place] [address] Breast Navigator to address whether pt was seen as had hx of cancelling appt w/ BS and [facility] last year. Cancelled 2 Breast [facility] 4724 appt; cancelled 1 Med Onc appt; and was No Show to Surgical vs.  Pt was sent a certified letter regarding lack of follow up to care.      Sindy Escamilla RN

## 2019-03-03 ENCOUNTER — APPOINTMENT (OUTPATIENT)
Dept: GENERAL RADIOLOGY | Age: 49
End: 2019-03-03
Attending: EMERGENCY MEDICINE
Payer: MEDICARE

## 2019-03-03 ENCOUNTER — HOSPITAL ENCOUNTER (EMERGENCY)
Age: 49
Discharge: HOME | End: 2019-03-04
Attending: EMERGENCY MEDICINE | Admitting: EMERGENCY MEDICINE
Payer: MEDICARE

## 2019-03-03 VITALS
HEIGHT: 66 IN | HEART RATE: 85 BPM | TEMPERATURE: 98.1 F | OXYGEN SATURATION: 94 % | SYSTOLIC BLOOD PRESSURE: 132 MMHG | RESPIRATION RATE: 18 BRPM | WEIGHT: 293 LBS | BODY MASS INDEX: 47.09 KG/M2 | DIASTOLIC BLOOD PRESSURE: 71 MMHG

## 2019-03-03 DIAGNOSIS — G47.30 SLEEP APNEA, UNSPECIFIED TYPE: ICD-10-CM

## 2019-03-03 DIAGNOSIS — J45.21 MILD INTERMITTENT ASTHMA WITH ACUTE EXACERBATION: Primary | ICD-10-CM

## 2019-03-03 LAB
ALBUMIN SERPL-MCNC: 3.3 G/DL (ref 3.5–5)
ALBUMIN/GLOB SERPL: 0.6 {RATIO} (ref 1.1–2.2)
ALP SERPL-CCNC: 92 U/L (ref 45–117)
ALT SERPL-CCNC: 20 U/L (ref 12–78)
ANION GAP SERPL CALC-SCNC: 2 MMOL/L (ref 5–15)
AST SERPL-CCNC: 16 U/L (ref 15–37)
BASOPHILS # BLD: 0 K/UL (ref 0–0.1)
BASOPHILS NFR BLD: 0 % (ref 0–1)
BILIRUB SERPL-MCNC: 0.4 MG/DL (ref 0.2–1)
BNP SERPL-MCNC: 235 PG/ML
BUN SERPL-MCNC: 15 MG/DL (ref 6–20)
BUN/CREAT SERPL: 19 (ref 12–20)
CALCIUM SERPL-MCNC: 10.1 MG/DL (ref 8.5–10.1)
CHLORIDE SERPL-SCNC: 101 MMOL/L (ref 97–108)
CO2 SERPL-SCNC: 39 MMOL/L (ref 21–32)
CREAT SERPL-MCNC: 0.8 MG/DL (ref 0.55–1.02)
DIFFERENTIAL METHOD BLD: ABNORMAL
EOSINOPHIL # BLD: 0.3 K/UL (ref 0–0.4)
EOSINOPHIL NFR BLD: 4 % (ref 0–7)
ERYTHROCYTE [DISTWIDTH] IN BLOOD BY AUTOMATED COUNT: 13 % (ref 11.5–14.5)
FLUAV AG NPH QL IA: NEGATIVE
FLUBV AG NOSE QL IA: NEGATIVE
GLOBULIN SER CALC-MCNC: 5.1 G/DL (ref 2–4)
GLUCOSE SERPL-MCNC: 116 MG/DL (ref 65–100)
HCT VFR BLD AUTO: 42.7 % (ref 35–47)
HGB BLD-MCNC: 12.4 G/DL (ref 11.5–16)
IMM GRANULOCYTES # BLD AUTO: 0 K/UL (ref 0–0.04)
IMM GRANULOCYTES NFR BLD AUTO: 0 % (ref 0–0.5)
LYMPHOCYTES # BLD: 1.3 K/UL (ref 0.8–3.5)
LYMPHOCYTES NFR BLD: 13 % (ref 12–49)
MCH RBC QN AUTO: 28.3 PG (ref 26–34)
MCHC RBC AUTO-ENTMCNC: 29 G/DL (ref 30–36.5)
MCV RBC AUTO: 97.5 FL (ref 80–99)
MONOCYTES # BLD: 0.8 K/UL (ref 0–1)
MONOCYTES NFR BLD: 8 % (ref 5–13)
NEUTS SEG # BLD: 7.1 K/UL (ref 1.8–8)
NEUTS SEG NFR BLD: 75 % (ref 32–75)
NRBC # BLD: 0 K/UL (ref 0–0.01)
NRBC BLD-RTO: 0 PER 100 WBC
PLATELET # BLD AUTO: 179 K/UL (ref 150–400)
PMV BLD AUTO: 11.1 FL (ref 8.9–12.9)
POTASSIUM SERPL-SCNC: 4 MMOL/L (ref 3.5–5.1)
PROT SERPL-MCNC: 8.4 G/DL (ref 6.4–8.2)
RBC # BLD AUTO: 4.38 M/UL (ref 3.8–5.2)
SODIUM SERPL-SCNC: 142 MMOL/L (ref 136–145)
TROPONIN I SERPL-MCNC: <0.05 NG/ML
WBC # BLD AUTO: 9.5 K/UL (ref 3.6–11)

## 2019-03-03 PROCEDURE — 99284 EMERGENCY DEPT VISIT MOD MDM: CPT

## 2019-03-03 PROCEDURE — 80053 COMPREHEN METABOLIC PANEL: CPT

## 2019-03-03 PROCEDURE — 36415 COLL VENOUS BLD VENIPUNCTURE: CPT

## 2019-03-03 PROCEDURE — 74011250636 HC RX REV CODE- 250/636: Performed by: EMERGENCY MEDICINE

## 2019-03-03 PROCEDURE — 71045 X-RAY EXAM CHEST 1 VIEW: CPT

## 2019-03-03 PROCEDURE — 85025 COMPLETE CBC W/AUTO DIFF WBC: CPT

## 2019-03-03 PROCEDURE — 77030029684 HC NEB SM VOL KT MONA -A

## 2019-03-03 PROCEDURE — 74011000250 HC RX REV CODE- 250: Performed by: EMERGENCY MEDICINE

## 2019-03-03 PROCEDURE — 93005 ELECTROCARDIOGRAM TRACING: CPT

## 2019-03-03 PROCEDURE — 99285 EMERGENCY DEPT VISIT HI MDM: CPT

## 2019-03-03 PROCEDURE — 94640 AIRWAY INHALATION TREATMENT: CPT

## 2019-03-03 PROCEDURE — 96374 THER/PROPH/DIAG INJ IV PUSH: CPT

## 2019-03-03 PROCEDURE — 83880 ASSAY OF NATRIURETIC PEPTIDE: CPT

## 2019-03-03 PROCEDURE — 87804 INFLUENZA ASSAY W/OPTIC: CPT

## 2019-03-03 PROCEDURE — 74011250637 HC RX REV CODE- 250/637: Performed by: EMERGENCY MEDICINE

## 2019-03-03 PROCEDURE — 84484 ASSAY OF TROPONIN QUANT: CPT

## 2019-03-03 RX ORDER — IPRATROPIUM BROMIDE AND ALBUTEROL SULFATE 2.5; .5 MG/3ML; MG/3ML
3 SOLUTION RESPIRATORY (INHALATION) 4 TIMES DAILY
Qty: 100 NEBULE | Refills: 1 | Status: SHIPPED | OUTPATIENT
Start: 2019-03-03

## 2019-03-03 RX ORDER — SODIUM CHLORIDE 0.9 % (FLUSH) 0.9 %
5-40 SYRINGE (ML) INJECTION EVERY 8 HOURS
Status: DISCONTINUED | OUTPATIENT
Start: 2019-03-03 | End: 2019-03-04 | Stop reason: HOSPADM

## 2019-03-03 RX ORDER — AZITHROMYCIN 250 MG/1
TABLET, FILM COATED ORAL
Qty: 4 TAB | Refills: 0 | Status: SHIPPED | OUTPATIENT
Start: 2019-03-03 | End: 2019-03-08

## 2019-03-03 RX ORDER — AZITHROMYCIN 250 MG/1
500 TABLET, FILM COATED ORAL
Status: COMPLETED | OUTPATIENT
Start: 2019-03-03 | End: 2019-03-03

## 2019-03-03 RX ORDER — IPRATROPIUM BROMIDE AND ALBUTEROL SULFATE 2.5; .5 MG/3ML; MG/3ML
3 SOLUTION RESPIRATORY (INHALATION) ONCE
Status: COMPLETED | OUTPATIENT
Start: 2019-03-03 | End: 2019-03-03

## 2019-03-03 RX ORDER — SODIUM CHLORIDE 0.9 % (FLUSH) 0.9 %
5-40 SYRINGE (ML) INJECTION AS NEEDED
Status: DISCONTINUED | OUTPATIENT
Start: 2019-03-03 | End: 2019-03-04 | Stop reason: HOSPADM

## 2019-03-03 RX ADMIN — AZITHROMYCIN 500 MG: 250 TABLET, FILM COATED ORAL at 22:43

## 2019-03-03 RX ADMIN — IPRATROPIUM BROMIDE AND ALBUTEROL SULFATE 3 ML: .5; 3 SOLUTION RESPIRATORY (INHALATION) at 19:37

## 2019-03-03 RX ADMIN — Medication 10 ML: at 22:43

## 2019-03-03 RX ADMIN — METHYLPREDNISOLONE SODIUM SUCCINATE 125 MG: 125 INJECTION, POWDER, FOR SOLUTION INTRAMUSCULAR; INTRAVENOUS at 22:43

## 2019-03-03 RX ADMIN — IPRATROPIUM BROMIDE AND ALBUTEROL SULFATE 3 ML: .5; 3 SOLUTION RESPIRATORY (INHALATION) at 22:43

## 2019-03-04 LAB
ATRIAL RATE: 77 BPM
CALCULATED P AXIS, ECG09: 32 DEGREES
CALCULATED R AXIS, ECG10: 59 DEGREES
CALCULATED T AXIS, ECG11: 31 DEGREES
DIAGNOSIS, 93000: NORMAL
P-R INTERVAL, ECG05: 186 MS
Q-T INTERVAL, ECG07: 362 MS
QRS DURATION, ECG06: 90 MS
QTC CALCULATION (BEZET), ECG08: 409 MS
VENTRICULAR RATE, ECG03: 77 BPM

## 2019-03-04 NOTE — ED NOTES
Bedside shift change report given to Mihir Marte RN and Sean Boogie RN (oncoming nurse) by Maryjo Quiñonez (offgoing nurse). Report included the following information ED Summary.

## 2019-03-04 NOTE — DISCHARGE INSTRUCTIONS
Patient Education        Asthma Attack: Care Instructions  Your Care Instructions    During an asthma attack, the airways swell and narrow. This makes it hard to breathe. Severe asthma attacks can be life-threatening, but you can help prevent them by keeping your asthma under control and treating symptoms before they get bad. Symptoms include being short of breath, having chest tightness, coughing, and wheezing. Noting and treating these symptoms can also help you avoid future trips to the emergency room. The doctor has checked you carefully, but problems can develop later. If you notice any problems or new symptoms, get medical treatment right away. Follow-up care is a key part of your treatment and safety. Be sure to make and go to all appointments, and call your doctor if you are having problems. It's also a good idea to know your test results and keep a list of the medicines you take. How can you care for yourself at home? · Follow your asthma action plan to prevent and treat attacks. If you don't have an asthma action plan, work with your doctor to create one. · Take your asthma medicines exactly as prescribed. Talk to your doctor right away if you have any questions about how to take them. ? Use your quick-relief medicine when you have symptoms of an attack. Quick-relief medicine is usually an albuterol inhaler. Some people need to use quick-relief medicine before they exercise. ? Take your controller medicine every day, not just when you have symptoms. Controller medicine is usually an inhaled corticosteroid. The goal is to prevent problems before they occur. Don't use your controller medicine to treat an attack that has already started. It doesn't work fast enough to help. ? If your doctor prescribed corticosteroid pills to use during an attack, take them exactly as prescribed. It may take hours for the pills to work, but they may make the episode shorter and help you breathe better. ?  Keep your quick-relief medicine with you at all times. · Talk to your doctor before using other medicines. Some medicines, such as aspirin, can cause asthma attacks in some people. · If you have a peak flow meter, use it to check how well you are breathing. This can help you predict when an asthma attack is going to occur. Then you can take medicine to prevent the asthma attack or make it less severe. · Do not smoke or allow others to smoke around you. Avoid smoky places. Smoking makes asthma worse. If you need help quitting, talk to your doctor about stop-smoking programs and medicines. These can increase your chances of quitting for good. · Learn what triggers an asthma attack for you, and avoid the triggers when you can. Common triggers include colds, smoke, air pollution, dust, pollen, mold, pets, cockroaches, stress, and cold air. · Avoid colds and the flu. Get a pneumococcal vaccine shot. If you have had one before, ask your doctor if you need a second dose. Get a flu vaccine every fall. If you must be around people with colds or the flu, wash your hands often. When should you call for help? Call 911 anytime you think you may need emergency care. For example, call if:    · You have severe trouble breathing.    Call your doctor now or seek immediate medical care if:    · Your symptoms do not get better after you have followed your asthma action plan.     · You have new or worse trouble breathing.     · Your coughing and wheezing get worse.     · You cough up dark brown or bloody mucus (sputum).     · You have a new or higher fever.    Watch closely for changes in your health, and be sure to contact your doctor if:    · You need to use quick-relief medicine on more than 2 days a week (unless it is just for exercise).     · You cough more deeply or more often, especially if you notice more mucus or a change in the color of your mucus.     · You are not getting better as expected. Where can you learn more?   Go to http://cassia-donte.info/. Enter O701 in the search box to learn more about \"Asthma Attack: Care Instructions. \"  Current as of: September 5, 2018  Content Version: 11.9  © 5467-2208 [employer], Incorporated. Care instructions adapted under license by [employer] (which disclaims liability or warranty for this information). If you have questions about a medical condition or this instruction, always ask your healthcare professional. [place] [address] any warranty or liability for your use of this information.

## 2019-03-04 NOTE — ED NOTES
Dr Yenny Tiwari has reviewed discharge instructions with the patient. The patient verbalized understanding. Pt. A&Ox4, respirations even and unlabored. VS stable as noted in flowsheet. Pt transported via [facility] at this time. ; paperwork in hand.

## 2019-03-04 NOTE — ED PROVIDER NOTES
EMERGENCY DEPARTMENT HISTORY AND PHYSICAL EXAM 
 
 
Date: 3/3/2019 Patient Name: Ernesto Cobian History of Presenting Illness Chief Complaint Patient presents with  Chest Pain Pain at mid chest/left side of chest radiating to back since 1500 yesterday. EKG show NSR.  EMS gave 324 ASA,  Remains on 5 L NC (baseline) History Provided By: Patient HPI: Ernesto Cobian, 57 y.o. female with PMHx significant for asthma, arthritis, CHF, HTN, SVT, NSTEMI, CAD, COPD, DM, presents by EMS to the ED with cc of moderate CP that started yesterday at 3:00 PM while sitting down. Pt reports lightheadedness, SOB chronically and sleep disturbances. Pt states because of insurance issues she has no PCP. Pt states she has taken no meds to try and relieve her Sx. She denies any modifying factors to her Sx. Pt specifically denies nausea, vomiting, diaphoresis, palpitations, numbness, weakness, tingling, calf pain, LE swelling, extremity pain, and lightheadedness. There are no other complaints, changes, or physical findings at this time. Social History: notobacco (0), 0EtOH (0), 0Illicit Drugs (0) Allergies: NKDA 
 
PCP: None Current Facility-Administered Medications Medication Dose Route Frequency Provider Last Rate Last Dose  sodium chloride (NS) flush 5-40 mL  5-40 mL IntraVENous Q8H Mustapha Gautam MD      
 sodium chloride (NS) flush 5-40 mL  5-40 mL IntraVENous PRN Dileep Boswell MD      
 
Current Outpatient Medications Medication Sig Dispense Refill  predniSONE (DELTASONE) 10 mg tablet 3 tablets daily for 3 days, then 2 tablets daily for 3 days, then 1 tablet daily for 3 days. 18 Tab 0  
 albuterol-ipratropium (DUO-NEB) 2.5 mg-0.5 mg/3 ml nebu 3 mL by Nebulization route four (4) times daily. 100 Nebule 1  
 furosemide (LASIX) 40 mg tablet Take 40 mg by mouth two (2) times a day.     
 naproxen sodium (ALEVE) 220 mg tablet Take 440 mg by mouth every twelve (12) hours as needed for Pain.  nystatin (MYCOSTATIN) topical cream Apply  to affected area three (3) times daily as needed for Skin Irritation.  nystatin (MYCOSTATIN) powder Apply  to affected area three (3) times daily as needed (skin irratation).  fluticasone (FLONASE) 50 mcg/actuation nasal spray 2 Sprays by Both Nostrils route daily. 1 Bottle 0  
 insulin glargine (LANTUS) 100 unit/mL injection 40 Units by SubCUTAneous route daily.  metoprolol tartrate (LOPRESSOR) 25 mg tablet Take 25 mg by mouth two (2) times a day.  fluticasone-salmeterol (ADVAIR DISKUS) 500-50 mcg/dose diskus inhaler Take 1 Puff by inhalation every twelve (12) hours.  loratadine (CLARITIN) 10 mg tablet Take 10 mg by mouth daily.  albuterol (VENTOLIN HFA) 90 mcg/actuation inhaler Take 2 Puffs by inhalation every six (6) hours as needed for Wheezing.  cyclobenzaprine (FLEXERIL) 5 mg tablet Take 5 mg by mouth two (2) times a day. flexeriol  aspirin 81 mg chewable tablet Take 81 mg by mouth daily.  hydrOXYzine pamoate (VISTARIL) 50 mg capsule Take 50 mg by mouth every eight (8) hours as needed for Itching.  lovastatin (MEVACOR) 40 mg tablet Take 1 Tab by mouth nightly. 30 Tab 6  
 glyBURIDE (DIABETA) 5 mg tablet Take 5 mg by mouth two (2) times daily (with meals).  ammonium lactate (LAC-HYDRIN) 12 % topical cream rub in to affected area well 280 g 1  
 nitroglycerin (NITROSTAT) 0.4 mg SL tablet 1 Tab by SubLINGual route every five (5) minutes as needed for Chest Pain (call 911 if not relieved by 3). 25 Tab 2 Past History Past Medical History: 
Past Medical History:  
Diagnosis Date  Arthritis  Asthma  Breast lump  CAD (coronary artery disease)  Congest[facility] failure ([facility] [address]  COPD (chronic obstructive pulmonary disease) (HCC)  Diabetes [facility]  Hypertension  Morbid obesity with BMI of 70 and over, adult ([facility]  NSTEMI (non-ST elevated myocardial infarction) ([facility] [address]  SVT (supraventricular tachycardia) ([facility]) Past Surgical History: 
Past Surgical History:  
Procedure Laterality Date  CARDIAC SURG PROCEDURE UNLIST Stents  HX  SECTION    
 HX ORTHOPAEDIC    
 HX OTHER SURGICAL    
 cyst removed from back Family History: 
Family History Problem Relation Age of Onset  Heart Disease Mother  Heart Disease Father  Heart Disease Sister  Breast Cancer Maternal Grandmother  Breast Cancer Paternal Grandmother Social History: 
Social History Tobacco Use  Smoking status: Former Smoker Packs/day: 0.25 Types: Cigarettes  Smokeless tobacco: Never Used  Tobacco comment: Patient states \"I  aint smoking no more d*mn cigarettes after yesterday\" 2018 Substance Use Topics  Alcohol use: No  
 Drug use: No  
 
 
Allergies: 
No Known Allergies Review of Systems Review of Systems Constitutional: Negative. Negative for chills, diaphoresis and fever. HENT: Negative. Negative for congestion and rhinorrhea. Respiratory: Positive for shortness of breath (chronic). Negative for cough, chest tightness and wheezing. Cardiovascular: Positive for chest pain. Negative for palpitations. Gastrointestinal: Negative. Negative for abdominal pain, constipation, nausea and vomiting. Endocrine: Negative. Genitourinary: Negative. Negative for decreased urine volume, flank pain, hematuria and pelvic pain. Musculoskeletal: Negative. Negative for back pain and neck pain. Denies calf pain Skin: Negative. Negative for color change, pallor and rash. Neurological: Negative. Negative for dizziness, seizures, weakness, light-headedness, numbness and headaches. Denies tingling Hematological: Negative. Negative for adenopathy. Psychiatric/Behavioral: Negative. All other systems reviewed and are negative.  
 
 
Physical Exam  
 Physical Exam  
Constitutional: She is oriented to person, place, and time. She appears well-developed and well-nourished. No distress. HENT:  
Head: Normocephalic and atraumatic. Mouth/Throat: No oropharyngeal exudate. Eyes: Conjunctivae are normal. Pupils are equal, round, and reactive to light. Right eye exhibits no discharge. Left eye exhibits no discharge. No scleral icterus. Neck: Normal range of motion. Neck supple. No JVD present. Cardiovascular: Normal rate, regular rhythm, normal heart sounds and intact distal pulses. Exam reveals no gallop and no friction rub. No murmur heard. Pulmonary/Chest: Effort normal. No stridor. No respiratory distress. She has wheezes. She has no rales. She exhibits tenderness. Abdominal: Soft. Bowel sounds are normal. She exhibits no distension and no mass. There is no tenderness. There is no rebound and no guarding. Neurological: She is alert and oriented to person, place, and time. She displays normal reflexes. No cranial nerve deficit. She exhibits normal muscle tone. Coordination normal.  
Skin: Skin is warm. No rash noted. She is not diaphoretic. No pallor. Nursing note and vitals reviewed. Diagnostic Study Results Labs - Recent Results (from the past 12 hour(s)) EKG, 12 LEAD, INITIAL Collection Time: 03/03/19  7:06 PM  
Result Value Ref Range Ventricular Rate 77 BPM  
 Atrial Rate 77 BPM  
 P-R Interval 186 ms QRS Duration 90 ms Q-T Interval 362 ms QTC Calculation (Bezet) 409 ms Calculated P Axis 32 degrees Calculated R Axis 59 degrees Calculated T Axis 31 degrees Diagnosis Normal sinus rhythm Low voltage QRS When compared with ECG of 08-JAN-2019 09:15, No significant change was found CBC WITH AUTOMATED DIFF Collection Time: 03/03/19  8:22 PM  
Result Value Ref Range WBC 9.5 3.6 - 11.0 K/uL  
 RBC 4.38 3.80 - 5.20 M/uL  
 HGB 12.4 11.5 - 16.0 g/dL HCT 42.7 35.0 - 47.0 % MCV 97.5 80.0 - 99.0 FL  
 MCH 28.3 26.0 - 34.0 PG  
 MCHC 29.0 (L) 30.0 - 36.5 g/dL  
 RDW 13.0 11.5 - 14.5 % PLATELET 044 399 - 608 K/uL MPV 11.1 8.9 - 12.9 FL  
 NRBC 0.0 0  WBC ABSOLUTE NRBC 0.00 0.00 - 0.01 K/uL NEUTROPHILS 75 32 - 75 % LYMPHOCYTES 13 12 - 49 % MONOCYTES 8 5 - 13 % EOSINOPHILS 4 0 - 7 % BASOPHILS 0 0 - 1 % IMMATURE GRANULOCYTES 0 0.0 - 0.5 % ABS. NEUTROPHILS 7.1 1.8 - 8.0 K/UL  
 ABS. LYMPHOCYTES 1.3 0.8 - 3.5 K/UL  
 ABS. MONOCYTES 0.8 0.0 - 1.0 K/UL  
 ABS. EOSINOPHILS 0.3 0.0 - 0.4 K/UL  
 ABS. BASOPHILS 0.0 0.0 - 0.1 K/UL  
 ABS. IMM. GRANS. 0.0 0.00 - 0.04 K/UL  
 DF AUTOMATED METABOLIC PANEL, COMPREHENSIVE Collection Time: 03/03/19  8:22 PM  
Result Value Ref Range Sodium 142 136 - 145 mmol/L Potassium 4.0 3.5 - 5.1 mmol/L Chloride 101 97 - 108 mmol/L  
 CO2 39 (H) 21 - 32 mmol/L Anion gap 2 (L) 5 - 15 mmol/L Glucose 116 (H) 65 - 100 mg/dL BUN 15 6 - 20 MG/DL Creatinine 0.80 0.55 - 1.02 MG/DL  
 BUN/Creatinine ratio 19 12 - 20 GFR est AA >60 >60 ml/min/1.73m2 GFR est non-AA >60 >60 ml/min/1.73m2 Calcium 10.1 8.5 - 10.1 MG/DL Bilirubin, total 0.4 0.2 - 1.0 MG/DL  
 ALT (SGPT) 20 12 - 78 U/L  
 AST (SGOT) 16 15 - 37 U/L Alk. phosphatase 92 45 - 117 U/L Protein, total 8.4 (H) 6.4 - 8.2 g/dL Albumin 3.3 (L) 3.5 - 5.0 g/dL Globulin 5.1 (H) 2.0 - 4.0 g/dL A-G Ratio 0.6 (L) 1.1 - 2.2    
TROPONIN I Collection Time: 03/03/19  8:22 PM  
Result Value Ref Range Troponin-I, Qt. <0.05 <0.05 ng/mL NT-PRO BNP Collection Time: 03/03/19  8:22 PM  
Result Value Ref Range NT pro- (H) <125 PG/ML Radiologic Studies -  
XR CHEST PORT Final Result Impression: Small left effusion and bilateral lower lobe atelectasis. CT Results  (Last 48 hours) None CXR Results  (Last 48 hours) 03/03/19 1931  XR CHEST PORT Final result Impression:  Impression: Small left effusion and bilateral lower lobe atelectasis. Narrative: Indication: Shortness of breath Comparison: 1/3/2019 Portable exam of the chest obtained at 1910 demonstrates normal heart size. There is a small left pleural effusion and bilateral lower lobe atelectasis. The  
osseous structures are unremarkable. Medical Decision Making I am the first provider for this patient. I reviewed the vital signs, available nursing notes, past medical history, past surgical history, family history and social history. Vital Signs-Reviewed the patient's vital signs. Patient Vitals for the past 12 hrs: 
 Temp Pulse Resp BP SpO2  
03/03/19 1855 98.1 °F (36.7 °C) 85 18 132/71 94 % Pulse Oximetry Analysis - 94% on nasal cannula, 5 liters O2 Cardiac Monitor:  
Rate: 85 bpm 
Rhythm: Normal Sinus Rhythm EKG interpretation: (Preliminary) 1906 Rhythm: normal sinus rhythm; and regular . Rate (approx.): 77; Axis: normal; LA interval: normal; QRS interval: low voltage; ST/T wave: normal; Other findings: normal. 
Written by Marty Herr ED Scribe, as dictated by Elaine Bowden MD. Records Reviewed: Nursing Notes, Old Medical Records, Previous Radiology Studies and Previous Laboratory Studies Provider Notes (Medical Decision Making): DDx: CHF, COPD, coronary syndrome, PE, PNA, influenza Impression plan: comes in presents c/o atypical CP at rest with SOB. On chronic O2. Clinically in NAD, will obtain labs, XRay and make disposition pending that patient is very concerned with home care so will assist with dispatch health. ED Course:  
Initial assessment performed. The patients presenting problems have been discussed, and they are in agreement with the care plan formulated and outlined with them. I have encouraged them to ask questions as they arise throughout their visit. Meds given in the ER:  
Medications sodium chloride (NS) flush 5-40 mL (not administered)  
sodium chloride (NS) flush 5-40 mL (not administered)  
albuterol-ipratropium (DUO-NEB) 2.5 MG-0.5 MG/3 ML (3 mL Nebulization Given 3/3/19 1937) Progress Note: 
  
 
Progress Note: 
10:15 PM 
No distress, discussed at length criteria for admission, she agrees she can go home. Will assist with dispatch health to address her further issues. Critical Care Time:  
0 minutes Disposition: 
Home PLAN: 
1. Current Discharge Medication List  
  
 
2. Follow-up Information None Return to ED if worse Diagnosis Clinical Impression: 1. Mild intermittent asthma with acute exacerbation 2. Sleep apnea, unspecified type Attestations: This note is prepared by Ese Cronin. Deirdre Thornton, acting as Scribe for Lino Sierra MD. Lino Sierra MD: The scribe's documentation has been prepared under my direction and personally reviewed by me in its entirety. I confirm that the note above accurately reflects all work, treatment, procedures, and medical decision making performed by me. This note will not be viewable in [address]

## 2019-03-04 NOTE — PROGRESS NOTES
CM received written consult re: follow up for PCP, CPap machine. CM called pt, introduced self, role. Pt verbalized understanding. Pt verified demographic information on chart. Pt endorsed frustration with visit in the ED last night. Pt stated \"no one looked at my wounds on my foot or my bottom and they hurt really bad. \" CM offered validation of feelings. Pt requested CM arrange home health for patient. CM informed pt that this CM is unable to arrange home health due to pt not having a following PCP. CM offered education re: PCP resources, Medicaid transportation. Pt stated she does not want to travel to a PCP office, but have someone come see her in the home. CM informed pt she will need to follow up with Medicaid re: visiting physician service. CM offered to send information re: [facility] PCP list. Pt verbalized understanding and agreed to [facility] mailing resource. Pt has no further questions or needs identified at this time. CM will continue to remain available for support, discharge planning as needed. Jeffrey Garcia, MSW, LSW Supervisee in Social Work [facility] [phone]

## 2019-03-14 ENCOUNTER — HOSPITAL ENCOUNTER (INPATIENT)
Age: 49
LOS: 2 days | Discharge: HOME HEALTH | DRG: 189 | End: 2019-03-17
Attending: EMERGENCY MEDICINE | Admitting: INTERNAL MEDICINE
Payer: MEDICARE

## 2019-03-14 ENCOUNTER — APPOINTMENT (OUTPATIENT)
Dept: GENERAL RADIOLOGY | Age: 49
DRG: 189 | End: 2019-03-14
Attending: EMERGENCY MEDICINE
Payer: MEDICARE

## 2019-03-14 DIAGNOSIS — Z71.89 GOALS OF CARE, COUNSELING/DISCUSSION: ICD-10-CM

## 2019-03-14 DIAGNOSIS — R53.81 DEBILITY: ICD-10-CM

## 2019-03-14 DIAGNOSIS — R06.02 SHORTNESS OF BREATH: ICD-10-CM

## 2019-03-14 LAB
ALBUMIN SERPL-MCNC: 2.9 G/DL (ref 3.5–5)
ALBUMIN/GLOB SERPL: 0.6 {RATIO} (ref 1.1–2.2)
ALP SERPL-CCNC: 88 U/L (ref 45–117)
ALT SERPL-CCNC: 16 U/L (ref 12–78)
ANION GAP SERPL CALC-SCNC: 4 MMOL/L (ref 5–15)
AST SERPL-CCNC: 11 U/L (ref 15–37)
BASOPHILS # BLD: 0 K/UL (ref 0–0.1)
BASOPHILS NFR BLD: 0 % (ref 0–1)
BILIRUB SERPL-MCNC: 0.3 MG/DL (ref 0.2–1)
BNP SERPL-MCNC: 271 PG/ML
BUN SERPL-MCNC: 15 MG/DL (ref 6–20)
BUN/CREAT SERPL: 19 (ref 12–20)
CALCIUM SERPL-MCNC: 10 MG/DL (ref 8.5–10.1)
CHLORIDE SERPL-SCNC: 99 MMOL/L (ref 97–108)
CK SERPL-CCNC: 22 U/L (ref 26–192)
CO2 SERPL-SCNC: 33 MMOL/L (ref 21–32)
COMMENT, HOLDF: NORMAL
CREAT SERPL-MCNC: 0.79 MG/DL (ref 0.55–1.02)
DIFFERENTIAL METHOD BLD: ABNORMAL
EOSINOPHIL # BLD: 0.2 K/UL (ref 0–0.4)
EOSINOPHIL NFR BLD: 3 % (ref 0–7)
ERYTHROCYTE [DISTWIDTH] IN BLOOD BY AUTOMATED COUNT: 13 % (ref 11.5–14.5)
FLUAV AG NPH QL IA: NEGATIVE
FLUBV AG NOSE QL IA: NEGATIVE
GLOBULIN SER CALC-MCNC: 4.7 G/DL (ref 2–4)
GLUCOSE SERPL-MCNC: 294 MG/DL (ref 65–100)
HCT VFR BLD AUTO: 40.3 % (ref 35–47)
HGB BLD-MCNC: 11.8 G/DL (ref 11.5–16)
IMM GRANULOCYTES # BLD AUTO: 0 K/UL (ref 0–0.04)
IMM GRANULOCYTES NFR BLD AUTO: 1 % (ref 0–0.5)
LYMPHOCYTES # BLD: 0.9 K/UL (ref 0.8–3.5)
LYMPHOCYTES NFR BLD: 11 % (ref 12–49)
MCH RBC QN AUTO: 28 PG (ref 26–34)
MCHC RBC AUTO-ENTMCNC: 29.3 G/DL (ref 30–36.5)
MCV RBC AUTO: 95.5 FL (ref 80–99)
MONOCYTES # BLD: 0.7 K/UL (ref 0–1)
MONOCYTES NFR BLD: 8 % (ref 5–13)
NEUTS SEG # BLD: 6.8 K/UL (ref 1.8–8)
NEUTS SEG NFR BLD: 77 % (ref 32–75)
NRBC # BLD: 0 K/UL (ref 0–0.01)
NRBC BLD-RTO: 0 PER 100 WBC
PLATELET # BLD AUTO: 155 K/UL (ref 150–400)
PMV BLD AUTO: 11 FL (ref 8.9–12.9)
POTASSIUM SERPL-SCNC: 4 MMOL/L (ref 3.5–5.1)
PROT SERPL-MCNC: 7.6 G/DL (ref 6.4–8.2)
RBC # BLD AUTO: 4.22 M/UL (ref 3.8–5.2)
SAMPLES BEING HELD,HOLD: NORMAL
SODIUM SERPL-SCNC: 136 MMOL/L (ref 136–145)
TROPONIN I SERPL-MCNC: <0.05 NG/ML
WBC # BLD AUTO: 8.7 K/UL (ref 3.6–11)

## 2019-03-14 PROCEDURE — 84484 ASSAY OF TROPONIN QUANT: CPT

## 2019-03-14 PROCEDURE — 71045 X-RAY EXAM CHEST 1 VIEW: CPT

## 2019-03-14 PROCEDURE — 96374 THER/PROPH/DIAG INJ IV PUSH: CPT

## 2019-03-14 PROCEDURE — 94640 AIRWAY INHALATION TREATMENT: CPT

## 2019-03-14 PROCEDURE — 82550 ASSAY OF CK (CPK): CPT

## 2019-03-14 PROCEDURE — 87804 INFLUENZA ASSAY W/OPTIC: CPT

## 2019-03-14 PROCEDURE — 85025 COMPLETE CBC W/AUTO DIFF WBC: CPT

## 2019-03-14 PROCEDURE — 80053 COMPREHEN METABOLIC PANEL: CPT

## 2019-03-14 PROCEDURE — 83880 ASSAY OF NATRIURETIC PEPTIDE: CPT

## 2019-03-14 PROCEDURE — 77030029684 HC NEB SM VOL KT MONA -A

## 2019-03-14 PROCEDURE — 93005 ELECTROCARDIOGRAM TRACING: CPT

## 2019-03-14 PROCEDURE — 99285 EMERGENCY DEPT VISIT HI MDM: CPT

## 2019-03-14 PROCEDURE — 36415 COLL VENOUS BLD VENIPUNCTURE: CPT

## 2019-03-14 RX ORDER — PREDNISONE 20 MG/1
60 TABLET ORAL
Status: COMPLETED | OUTPATIENT
Start: 2019-03-14 | End: 2019-03-15

## 2019-03-14 RX ORDER — IPRATROPIUM BROMIDE AND ALBUTEROL SULFATE 2.5; .5 MG/3ML; MG/3ML
3 SOLUTION RESPIRATORY (INHALATION)
Status: COMPLETED | OUTPATIENT
Start: 2019-03-14 | End: 2019-03-15

## 2019-03-15 PROBLEM — J96.21 ACUTE ON CHRONIC RESPIRATORY FAILURE WITH HYPOXIA (HCC): Status: ACTIVE | Noted: 2019-03-15

## 2019-03-15 LAB
ARTERIAL PATENCY WRIST A: YES
ATRIAL RATE: 89 BPM
ATRIAL RATE: 95 BPM
BASE EXCESS BLD CALC-SCNC: 8 MMOL/L
BDY SITE: ABNORMAL
CALCULATED P AXIS, ECG09: 56 DEGREES
CALCULATED P AXIS, ECG09: 64 DEGREES
CALCULATED R AXIS, ECG10: 100 DEGREES
CALCULATED R AXIS, ECG10: 77 DEGREES
CALCULATED T AXIS, ECG11: 37 DEGREES
CALCULATED T AXIS, ECG11: 46 DEGREES
DIAGNOSIS, 93000: NORMAL
DIAGNOSIS, 93000: NORMAL
GAS FLOW.O2 O2 DELIVERY SYS: ABNORMAL L/MIN
GAS FLOW.O2 SETTING OXYMISER: 4 L/M
GLUCOSE BLD STRIP.AUTO-MCNC: 247 MG/DL (ref 65–100)
GLUCOSE BLD STRIP.AUTO-MCNC: 270 MG/DL (ref 65–100)
GLUCOSE BLD STRIP.AUTO-MCNC: 274 MG/DL (ref 65–100)
GLUCOSE BLD STRIP.AUTO-MCNC: 313 MG/DL (ref 65–100)
HCO3 BLD-SCNC: 34.3 MMOL/L (ref 22–26)
O2/TOTAL GAS SETTING VFR VENT: 36 %
P-R INTERVAL, ECG05: 178 MS
P-R INTERVAL, ECG05: 182 MS
PCO2 BLD: 65.4 MMHG (ref 35–45)
PH BLD: 7.33 [PH] (ref 7.35–7.45)
PO2 BLD: 64 MMHG (ref 80–100)
Q-T INTERVAL, ECG07: 336 MS
Q-T INTERVAL, ECG07: 338 MS
QRS DURATION, ECG06: 82 MS
QRS DURATION, ECG06: 88 MS
QTC CALCULATION (BEZET), ECG08: 411 MS
QTC CALCULATION (BEZET), ECG08: 422 MS
SAO2 % BLD: 90 % (ref 92–97)
SERVICE CMNT-IMP: ABNORMAL
SPECIMEN TYPE: ABNORMAL
TOTAL RESP. RATE, ITRR: 23
VENTRICULAR RATE, ECG03: 89 BPM
VENTRICULAR RATE, ECG03: 95 BPM

## 2019-03-15 PROCEDURE — 76450000000

## 2019-03-15 PROCEDURE — 74011250636 HC RX REV CODE- 250/636: Performed by: EMERGENCY MEDICINE

## 2019-03-15 PROCEDURE — 97167 OT EVAL HIGH COMPLEX 60 MIN: CPT | Performed by: OCCUPATIONAL THERAPIST

## 2019-03-15 PROCEDURE — 74011636637 HC RX REV CODE- 636/637: Performed by: EMERGENCY MEDICINE

## 2019-03-15 PROCEDURE — 82962 GLUCOSE BLOOD TEST: CPT

## 2019-03-15 PROCEDURE — 97161 PT EVAL LOW COMPLEX 20 MIN: CPT

## 2019-03-15 PROCEDURE — 94760 N-INVAS EAR/PLS OXIMETRY 1: CPT

## 2019-03-15 PROCEDURE — 74011250636 HC RX REV CODE- 250/636: Performed by: INTERNAL MEDICINE

## 2019-03-15 PROCEDURE — 77010033678 HC OXYGEN DAILY

## 2019-03-15 PROCEDURE — 94640 AIRWAY INHALATION TREATMENT: CPT

## 2019-03-15 PROCEDURE — 65660000000 HC RM CCU STEPDOWN

## 2019-03-15 PROCEDURE — 74011000250 HC RX REV CODE- 250: Performed by: EMERGENCY MEDICINE

## 2019-03-15 PROCEDURE — 74011000250 HC RX REV CODE- 250: Performed by: INTERNAL MEDICINE

## 2019-03-15 PROCEDURE — 74011000250 HC RX REV CODE- 250: Performed by: NURSE PRACTITIONER

## 2019-03-15 PROCEDURE — 36600 WITHDRAWAL OF ARTERIAL BLOOD: CPT

## 2019-03-15 PROCEDURE — 93005 ELECTROCARDIOGRAM TRACING: CPT

## 2019-03-15 PROCEDURE — 97116 GAIT TRAINING THERAPY: CPT

## 2019-03-15 PROCEDURE — 97535 SELF CARE MNGMENT TRAINING: CPT | Performed by: OCCUPATIONAL THERAPIST

## 2019-03-15 PROCEDURE — 74011636637 HC RX REV CODE- 636/637: Performed by: INTERNAL MEDICINE

## 2019-03-15 PROCEDURE — 74011250637 HC RX REV CODE- 250/637: Performed by: INTERNAL MEDICINE

## 2019-03-15 PROCEDURE — 82803 BLOOD GASES ANY COMBINATION: CPT

## 2019-03-15 RX ORDER — SODIUM CHLORIDE 0.9 % (FLUSH) 0.9 %
5-40 SYRINGE (ML) INJECTION EVERY 8 HOURS
Status: DISCONTINUED | OUTPATIENT
Start: 2019-03-15 | End: 2019-03-17 | Stop reason: HOSPADM

## 2019-03-15 RX ORDER — MICONAZOLE NITRATE 20 MG/G
CREAM TOPICAL DAILY
Status: DISCONTINUED | OUTPATIENT
Start: 2019-03-15 | End: 2019-03-17 | Stop reason: HOSPADM

## 2019-03-15 RX ORDER — DEXTROSE 50 % IN WATER (D50W) INTRAVENOUS SYRINGE
12.5-25 AS NEEDED
Status: DISCONTINUED | OUTPATIENT
Start: 2019-03-15 | End: 2019-03-17 | Stop reason: HOSPADM

## 2019-03-15 RX ORDER — PRAVASTATIN SODIUM 40 MG/1
40 TABLET ORAL
Status: DISCONTINUED | OUTPATIENT
Start: 2019-03-15 | End: 2019-03-17 | Stop reason: HOSPADM

## 2019-03-15 RX ORDER — METOPROLOL TARTRATE 25 MG/1
25 TABLET, FILM COATED ORAL 2 TIMES DAILY
Status: DISCONTINUED | OUTPATIENT
Start: 2019-03-15 | End: 2019-03-17 | Stop reason: HOSPADM

## 2019-03-15 RX ORDER — INSULIN LISPRO 100 [IU]/ML
INJECTION, SOLUTION INTRAVENOUS; SUBCUTANEOUS
Status: DISCONTINUED | OUTPATIENT
Start: 2019-03-15 | End: 2019-03-17 | Stop reason: HOSPADM

## 2019-03-15 RX ORDER — LORATADINE 10 MG/1
10 TABLET ORAL DAILY
Status: DISCONTINUED | OUTPATIENT
Start: 2019-03-15 | End: 2019-03-17 | Stop reason: HOSPADM

## 2019-03-15 RX ORDER — NYSTATIN 100000 U/G
CREAM TOPICAL
Status: DISCONTINUED | OUTPATIENT
Start: 2019-03-15 | End: 2019-03-17 | Stop reason: HOSPADM

## 2019-03-15 RX ORDER — METOPROLOL TARTRATE 5 MG/5ML
2.5 INJECTION INTRAVENOUS ONCE
Status: COMPLETED | OUTPATIENT
Start: 2019-03-15 | End: 2019-03-15

## 2019-03-15 RX ORDER — FLUTICASONE FUROATE AND VILANTEROL 200; 25 UG/1; UG/1
1 POWDER RESPIRATORY (INHALATION) DAILY
Status: DISCONTINUED | OUTPATIENT
Start: 2019-03-15 | End: 2019-03-17 | Stop reason: HOSPADM

## 2019-03-15 RX ORDER — INSULIN GLARGINE 100 [IU]/ML
40 INJECTION, SOLUTION SUBCUTANEOUS DAILY
Status: DISCONTINUED | OUTPATIENT
Start: 2019-03-15 | End: 2019-03-17 | Stop reason: HOSPADM

## 2019-03-15 RX ORDER — SODIUM CHLORIDE 0.9 % (FLUSH) 0.9 %
5-40 SYRINGE (ML) INJECTION AS NEEDED
Status: DISCONTINUED | OUTPATIENT
Start: 2019-03-15 | End: 2019-03-17 | Stop reason: HOSPADM

## 2019-03-15 RX ORDER — ONDANSETRON 2 MG/ML
4 INJECTION INTRAMUSCULAR; INTRAVENOUS
Status: DISCONTINUED | OUTPATIENT
Start: 2019-03-15 | End: 2019-03-17 | Stop reason: HOSPADM

## 2019-03-15 RX ORDER — MAGNESIUM SULFATE 100 %
4 CRYSTALS MISCELLANEOUS AS NEEDED
Status: DISCONTINUED | OUTPATIENT
Start: 2019-03-15 | End: 2019-03-17 | Stop reason: HOSPADM

## 2019-03-15 RX ORDER — CYCLOBENZAPRINE HCL 10 MG
5 TABLET ORAL
Status: DISCONTINUED | OUTPATIENT
Start: 2019-03-15 | End: 2019-03-17 | Stop reason: HOSPADM

## 2019-03-15 RX ORDER — HYDROXYZINE 25 MG/1
50 TABLET, FILM COATED ORAL
Status: DISCONTINUED | OUTPATIENT
Start: 2019-03-15 | End: 2019-03-17 | Stop reason: HOSPADM

## 2019-03-15 RX ORDER — NITROGLYCERIN 0.4 MG/1
0.4 TABLET SUBLINGUAL
Status: DISCONTINUED | OUTPATIENT
Start: 2019-03-15 | End: 2019-03-17 | Stop reason: HOSPADM

## 2019-03-15 RX ORDER — FUROSEMIDE 10 MG/ML
40 INJECTION INTRAMUSCULAR; INTRAVENOUS 2 TIMES DAILY
Status: DISCONTINUED | OUTPATIENT
Start: 2019-03-15 | End: 2019-03-17 | Stop reason: HOSPADM

## 2019-03-15 RX ORDER — GUAIFENESIN 100 MG/5ML
81 LIQUID (ML) ORAL DAILY
Status: DISCONTINUED | OUTPATIENT
Start: 2019-03-15 | End: 2019-03-17 | Stop reason: HOSPADM

## 2019-03-15 RX ORDER — IPRATROPIUM BROMIDE AND ALBUTEROL SULFATE 2.5; .5 MG/3ML; MG/3ML
3 SOLUTION RESPIRATORY (INHALATION)
Status: DISCONTINUED | OUTPATIENT
Start: 2019-03-15 | End: 2019-03-17

## 2019-03-15 RX ORDER — FLUTICASONE PROPIONATE 50 MCG
2 SPRAY, SUSPENSION (ML) NASAL DAILY
Status: DISCONTINUED | OUTPATIENT
Start: 2019-03-15 | End: 2019-03-17 | Stop reason: HOSPADM

## 2019-03-15 RX ORDER — AMMONIUM LACTATE 12 G/100G
LOTION TOPICAL 2 TIMES DAILY
Status: DISCONTINUED | OUTPATIENT
Start: 2019-03-15 | End: 2019-03-17 | Stop reason: HOSPADM

## 2019-03-15 RX ORDER — FUROSEMIDE 10 MG/ML
40 INJECTION INTRAMUSCULAR; INTRAVENOUS
Status: COMPLETED | OUTPATIENT
Start: 2019-03-15 | End: 2019-03-15

## 2019-03-15 RX ORDER — ENOXAPARIN SODIUM 100 MG/ML
40 INJECTION SUBCUTANEOUS EVERY 12 HOURS
Status: DISCONTINUED | OUTPATIENT
Start: 2019-03-15 | End: 2019-03-17 | Stop reason: HOSPADM

## 2019-03-15 RX ORDER — ACETAMINOPHEN 325 MG/1
650 TABLET ORAL
Status: DISCONTINUED | OUTPATIENT
Start: 2019-03-15 | End: 2019-03-17 | Stop reason: HOSPADM

## 2019-03-15 RX ADMIN — INSULIN LISPRO 7 UNITS: 100 INJECTION, SOLUTION INTRAVENOUS; SUBCUTANEOUS at 12:17

## 2019-03-15 RX ADMIN — IPRATROPIUM BROMIDE AND ALBUTEROL SULFATE 3 ML: .5; 3 SOLUTION RESPIRATORY (INHALATION) at 20:51

## 2019-03-15 RX ADMIN — INSULIN LISPRO 5 UNITS: 100 INJECTION, SOLUTION INTRAVENOUS; SUBCUTANEOUS at 18:22

## 2019-03-15 RX ADMIN — Medication 10 ML: at 15:28

## 2019-03-15 RX ADMIN — Medication 10 ML: at 21:45

## 2019-03-15 RX ADMIN — INSULIN GLARGINE 40 UNITS: 100 INJECTION, SOLUTION SUBCUTANEOUS at 08:46

## 2019-03-15 RX ADMIN — Medication: at 18:38

## 2019-03-15 RX ADMIN — INSULIN LISPRO 2 UNITS: 100 INJECTION, SOLUTION INTRAVENOUS; SUBCUTANEOUS at 21:45

## 2019-03-15 RX ADMIN — IPRATROPIUM BROMIDE AND ALBUTEROL SULFATE 3 ML: .5; 3 SOLUTION RESPIRATORY (INHALATION) at 12:49

## 2019-03-15 RX ADMIN — ENOXAPARIN SODIUM 40 MG: 40 INJECTION SUBCUTANEOUS at 08:49

## 2019-03-15 RX ADMIN — METOPROLOL TARTRATE 25 MG: 25 TABLET ORAL at 18:24

## 2019-03-15 RX ADMIN — PREDNISONE 60 MG: 20 TABLET ORAL at 00:07

## 2019-03-15 RX ADMIN — PRAVASTATIN SODIUM 40 MG: 40 TABLET ORAL at 21:45

## 2019-03-15 RX ADMIN — FUROSEMIDE 40 MG: 10 INJECTION, SOLUTION INTRAMUSCULAR; INTRAVENOUS at 08:48

## 2019-03-15 RX ADMIN — INSULIN LISPRO 5 UNITS: 100 INJECTION, SOLUTION INTRAVENOUS; SUBCUTANEOUS at 08:46

## 2019-03-15 RX ADMIN — MICONAZOLE NITRATE: 20 CREAM TOPICAL at 18:38

## 2019-03-15 RX ADMIN — ENOXAPARIN SODIUM 40 MG: 40 INJECTION SUBCUTANEOUS at 21:45

## 2019-03-15 RX ADMIN — CYCLOBENZAPRINE HYDROCHLORIDE 5 MG: 10 TABLET, FILM COATED ORAL at 21:50

## 2019-03-15 RX ADMIN — ASPIRIN 81 MG 81 MG: 81 TABLET ORAL at 08:49

## 2019-03-15 RX ADMIN — IPRATROPIUM BROMIDE AND ALBUTEROL SULFATE 3 ML: .5; 3 SOLUTION RESPIRATORY (INHALATION) at 08:15

## 2019-03-15 RX ADMIN — LORATADINE 10 MG: 10 TABLET ORAL at 08:49

## 2019-03-15 RX ADMIN — FUROSEMIDE 40 MG: 10 INJECTION, SOLUTION INTRAMUSCULAR; INTRAVENOUS at 02:08

## 2019-03-15 RX ADMIN — FUROSEMIDE 40 MG: 10 INJECTION, SOLUTION INTRAMUSCULAR; INTRAVENOUS at 18:22

## 2019-03-15 RX ADMIN — IPRATROPIUM BROMIDE AND ALBUTEROL SULFATE 3 ML: .5; 3 SOLUTION RESPIRATORY (INHALATION) at 00:07

## 2019-03-15 RX ADMIN — Medication 10 ML: at 08:40

## 2019-03-15 RX ADMIN — FLUTICASONE FUROATE AND VILANTEROL TRIFENATATE 1 PUFF: 200; 25 POWDER RESPIRATORY (INHALATION) at 10:42

## 2019-03-15 RX ADMIN — NYSTATIN: 100000 CREAM TOPICAL at 18:34

## 2019-03-15 RX ADMIN — FLUTICASONE PROPIONATE 2 SPRAY: 50 SPRAY, METERED NASAL at 10:42

## 2019-03-15 RX ADMIN — METOPROLOL TARTRATE 2.5 MG: 5 INJECTION INTRAVENOUS at 08:38

## 2019-03-15 RX ADMIN — Medication 10 ML: at 05:39

## 2019-03-15 NOTE — WOUND CARE
Wound care Nurse consult from staff nurse stating \" severe crustiness on lower extremities combined with obesity and excoration on abd folds and under breasts\". Patient is well known to [facility] team hear for BLE lymphedema, LLE stasis wound, morbid obesity and chronic intertriginous MASD and non-compliance. Past Medical History:   Diagnosis Date    Arthritis     Asthma     Breast lump     CAD (coronary artery disease)     Congestive heart failure (HCC)     COPD (chronic obstructive pulmonary disease) (HCC)     [facility]     Hypertension     Morbid obesity with BMI of 70 and over, adult ([facility]     NSTEMI (non-ST elevated myocardial infarction) ([facility]     SVT (supraventricular tachycardia) ([facility] [address]      Past Surgical History:   Procedure Laterality Date    CARDIAC SURG PROCEDURE UNLIST      Stents    HX  SECTION      HX ORTHOPAEDIC      HX OTHER SURGICAL      cyst removed from back     LLE posterior lower calf has a open area aprox 1.5 x 3 x 0.1 cm's. Scant amount of serous-purulent drainage, jae-wound thick crusted fungal smelling skin. Wound is smaller then last seen aprox 2 months ago. Recommend  LLE posterior : daily (do after daily washing of legs and Lac-hydrin application)cleanse with dermal wound cleanser spray and 4x4. Cover open wound with a piece of Xeroform   medicated gauze, dry 4x4 and secure with gauze julian. BLE: daily cleanse with foaming soap and water and make sure skin is cleaned and dried in folds of skin. Apply Lac-hydrin lotion thck to dry crusted skin to help loosen and remove. Skin folds: wash with foaming soap and water, pat completely dry with paper towel. Apply thin layer of Secura anti-fungal zinc cream to skin fold.     Mayela Ball RN, [employer]

## 2019-03-15 NOTE — CONSULTS
[facility] [place] [facility]  Pulmonary, Critical Care, and Sleep Medicine    Initial Patient Consult    Name: Jackie Pedro MRN: [id number]   : 1970 Hospital: [facility]   Date: 3/15/2019        IMPRESSION:   · GEN has been on Trilogy as Rx per Dr. Eriberto Sanchez. [facility]. · SVT, Afib with RVR  · Acute on Chronic Hypoxic and Hypercarbic Respiratory Failure  · Possible acute   · Medical Noncompliance  · CAD with stents in ,  of the distal LAD. · LVEF of 55%  · Chronic Lymphedema  · Hypertension  · Super Morbid Obesity  · DM  · Hx of Smoking. · Pt has been with multiple admissions over the last 6 months. RECOMMENDATIONS:   · Placed order for her Trilogy with her home setting. Sees Dr. Eriberto Sanchez. · Oxygen as needed to keep POx over 90%  · Agree with iv diuretics  · Will need repeat labs in am.   · ON Duonebs  · ON Enoxaparin   · ON Breo  · Glycemic control and monitoring. · Will see as needed over the weekend. Subjective: This patient has been seen and evaluated at the request of Dr. Maegan Bedoya for above. Patient is a 57 y.o. female who presented to ER for evaluation of shortness of breath. Has complicated PMH as listed above. Has been with increased shortness of breath, increased chest pain, and dizziness. This has been worsening over the last 3 weeks. Has increased bilateral leg swelling. Pt was seen in ER on 3/3/19. Was treated with an Abx which did not help her. She has been using her inhaler without benefit. She is on baseline 5L of oxygen continuously. NO acute coughing. No abdominal pain. NO Nausea, vomiting, no diarrhea. Since admission she has been with good diuresis, and feels like her breathing is a little better. Pt has issues with ongoing non compliance. Had Afib with RVR with rates in 170s.            Past Medical History:   Diagnosis Date    Arthritis     Asthma     Breast lump     CAD (coronary artery disease)     Congestive heart failure (HCC)     COPD [facility] obstructive [facility]     Diabetes ([facility]     Hypertension     Morbid obesity with BMI of 70 and over, adult ([facility]     NSTEMI (non-ST elevated myocardial infarction) ([facility]     SVT (supraventricular tachycardia) ([facility]       Past Surgical History:   Procedure Laterality Date    CARDIAC SURG PROCEDURE UNLIST      Stents    HX  SECTION      HX ORTHOPAEDIC      HX OTHER SURGICAL      cyst removed from back      Prior to Admission medications    Medication Sig Start Date End Date Taking? Authorizing Provider   albuterol-ipratropium (DUO-NEB) 2.5 mg-0.5 mg/3 ml nebu 3 mL by Nebulization route four (4) times daily. 3/3/19  Yes Raghu Nath MD   furosemide (LASIX) 40 mg tablet Take 40 mg by mouth two (2) times a day. Yes Other, MD Landon   naproxen sodium (ALEVE) 220 mg tablet Take 440 mg by mouth every twelve (12) hours as needed for Pain. Yes Other, MD Landon   fluticasone (FLONASE) 50 mcg/actuation nasal spray 2 Sprays by Both Nostrils route daily. 19  Yes Lacey Morataya MD   insulin glargine (LANTUS) 100 unit/mL injection 40 Units by SubCUTAneous route daily. Yes Provider, Historical   metoprolol tartrate (LOPRESSOR) 25 mg tablet Take 25 mg by mouth two (2) times a day. Yes Provider, Historical   fluticasone-salmeterol (ADVAIR DISKUS) 500-50 mcg/dose diskus inhaler Take 1 Puff by inhalation every twelve (12) hours. Yes Provider, Historical   loratadine (CLARITIN) 10 mg tablet Take 10 mg by mouth daily. Yes Provider, Historical   albuterol (VENTOLIN HFA) 90 mcg/actuation inhaler Take 2 Puffs by inhalation every six (6) hours as needed for Wheezing. Yes Provider, Historical   cyclobenzaprine (FLEXERIL) 5 mg tablet Take 5 mg by mouth two (2) times a day. flexeriol    Yes Provider, Historical   aspirin 81 mg chewable tablet Take 81 mg by mouth daily.    Yes Provider, Historical   hydrOXYzine pamoate (VISTARIL) 50 mg capsule Take 50 mg by mouth every eight (8) hours as needed for Itching. Yes Provider, Historical   lovastatin (MEVACOR) 40 mg tablet Take 1 Tab by mouth nightly. 1/14/16  Yes Brody Villalobos NP   glyBURIDE (DIABETA) 5 mg tablet Take 5 mg by mouth two (2) times daily (with meals). Yes Provider, Historical   ammonium lactate (LAC-HYDRIN) 12 % topical cream rub in to affected area well 11/21/14  Yes Anna Song MD   nystatin (MYCOSTATIN) topical cream Apply  to affected area three (3) times daily as needed for Skin Irritation. OtherLandon MD   nystatin (MYCOSTATIN) powder Apply  to affected area three (3) times daily as needed (skin irratation). OtherLandon MD   nitroglycerin (NITROSTAT) 0.4 mg SL tablet 1 Tab by SubLINGual route every five (5) minutes as needed for Chest Pain (call 911 if not relieved by 3).  9/12/13   Brody Villalobos NP     No Known Allergies   Social History     Tobacco Use    Smoking status: Former Smoker     Packs/day: 0.25     Types: Cigarettes    Smokeless tobacco: Never Used    Tobacco comment: Patient states \"I  aint smoking no more d*mn cigarettes after yesterday\" 01/26/2018   Substance Use Topics    Alcohol use: No      Family History   Problem Relation Age of Onset    Heart Disease Mother     Heart Disease Father     Heart Disease Sister     Breast Cancer Maternal Grandmother     Breast Cancer Paternal Grandmother         Current Facility-Administered Medications   Medication Dose Route Frequency    furosemide (LASIX) injection 40 mg  40 mg IntraVENous BID    albuterol-ipratropium (DUO-NEB) 2.5 MG-0.5 MG/3 ML  3 mL Nebulization Q4H RT    aspirin chewable tablet 81 mg  81 mg Oral DAILY    fluticasone-vilanterol (BREO ELLIPTA) 200mcg-25mcg/puff  1 Puff Inhalation DAILY    fluticasone propionate (FLONASE) 50 mcg/actuation nasal spray 2 Spray  2 Spray Both Nostrils DAILY    insulin lispro (HUMALOG) injection   SubCUTAneous AC&HS    insulin glargine (LANTUS) injection 40 Units  40 Units SubCUTAneous DAILY    loratadine (CLARITIN) tablet 10 mg  10 mg Oral DAILY    pravastatin (PRAVACHOL) tablet 40 mg  40 mg Oral QHS    metoprolol tartrate (LOPRESSOR) tablet 25 mg  25 mg Oral BID    sodium chloride (NS) flush 5-40 mL  5-40 mL IntraVENous Q8H    enoxaparin (LOVENOX) injection 40 mg  40 mg SubCUTAneous Q12H    miconazole (SECURA) 2 % extra thick cream   Topical DAILY    ammonium lactate (LAC-HYDRIN) 12 % lotion   Topical BID       Review of Systems:  A comprehensive review of systems was negative. Objective:   Vital Signs:    Visit Vitals  /71 (BP 1 Location: Left arm, BP Patient Position: Lying left side)   Pulse (!) 105   Temp 98 °F (36.7 °C)   Resp 18   Ht 5' 6\" (1.676 m)   Wt (!) 192.2 kg (423 lb 11.6 oz)   SpO2 94%   BMI 68.39 kg/m²       O2 Device: Nasal cannula   O2 Flow Rate (L/min): 5 l/min   Temp (24hrs), Av.2 °F (36.8 °C), Min:98 °F (36.7 °C), Max:98.6 °F (37 °C)       Intake/Output:   Last shift:      03/15 0701 - 03/15 1900  In: 480 [P.O.:480]  Out: 2500 [Urine:2500]  Last 3 shifts: No intake/output data recorded. Intake/Output Summary (Last 24 hours) at 3/15/2019 1623  Last data filed at 3/15/2019 1412  Gross per 24 hour   Intake 480 ml   Output 2500 ml   Net -2020 ml      Physical Exam:   General:  Alert, cooperative, no distress, appears stated age. Sitting up in chair conversant. Morbidly obese. Head:  Normocephalic, without obvious abnormality, atraumatic. Eyes:  Conjunctivae/corneas clear. PERRL, EOMs intact. Nose: Nares normal. Septum midline. Mucosa normal. No drainage or sinus tenderness. Throat: Lips, mucosa, and tongue normal. Teeth and gums normal.   Neck: Supple, symmetrical, trachea midline, no adenopathy, thyroid: no enlargment/tenderness/nodules, no carotid bruit and no JVD. Back:   Symmetric, no curvature. ROM normal.   Lungs:   Clear to auscultation bilaterally. Chest wall:  No tenderness or deformity. Heart:  Regular rate and rhythm, S1, S2 normal, no murmur, click, rub or gallop. Abdomen:   Soft, non-tender. Bowel sounds normal. No masses,  No organomegaly. Extremities: Extremities normal, atraumatic, no cyanosis, Chronic bilateral lower extremity edema. Pulses: 2+ and symmetric all extremities. Skin: Skin color, texture, turgor normal. No rashes or lesions   Lymph nodes: Cervical, supraclavicular, and axillary nodes normal.   Neurologic: Grossly nonfocal, Motor is intact  Psych: no overt anxiety or depression. Data review:     Recent Results (from the past 24 hour(s))   EKG, 12 LEAD, INITIAL    Collection Time: 03/14/19  9:49 PM   Result Value Ref Range    Ventricular Rate 89 BPM    Atrial Rate 89 BPM    P-R Interval 182 ms    QRS Duration 82 ms    Q-T Interval 338 ms    QTC Calculation (Bezet) 411 ms    Calculated P Axis 56 degrees    Calculated R Axis 100 degrees    Calculated T Axis 37 degrees    Diagnosis       Normal sinus rhythm  Rightward axis  Low voltage QRS  When compared with ECG of 03-MAR-2019 19:06,  No significant change was found  Confirmed by Ale Horton ([id number]) on 3/15/2019 9:17:25 AM     CBC WITH AUTOMATED DIFF    Collection Time: 03/14/19 10:30 PM   Result Value Ref Range    WBC 8.7 3.6 - 11.0 K/uL    RBC 4.22 3.80 - 5.20 M/uL    HGB 11.8 11.5 - 16.0 g/dL    HCT 40.3 35.0 - 47.0 %    MCV 95.5 80.0 - 99.0 FL    MCH 28.0 26.0 - 34.0 PG    MCHC 29.3 (L) 30.0 - 36.5 g/dL    RDW 13.0 11.5 - 14.5 %    PLATELET 867 833 - 688 K/uL    MPV 11.0 8.9 - 12.9 FL    NRBC 0.0 0  WBC    ABSOLUTE NRBC 0.00 0.00 - 0.01 K/uL    NEUTROPHILS 77 (H) 32 - 75 %    LYMPHOCYTES 11 (L) 12 - 49 %    MONOCYTES 8 5 - 13 %    EOSINOPHILS 3 0 - 7 %    BASOPHILS 0 0 - 1 %    IMMATURE GRANULOCYTES 1 (H) 0.0 - 0.5 %    ABS. NEUTROPHILS 6.8 1.8 - 8.0 K/UL    ABS. LYMPHOCYTES 0.9 0.8 - 3.5 K/UL    ABS. MONOCYTES 0.7 0.0 - 1.0 K/UL    ABS. EOSINOPHILS 0.2 0.0 - 0.4 K/UL    ABS.  BASOPHILS 0.0 0.0 - 0.1 K/UL    ABS. IMM. GRANS. 0.0 0.00 - 0.04 K/UL    DF AUTOMATED     METABOLIC PANEL, COMPREHENSIVE    Collection Time: 03/14/19 10:30 PM   Result Value Ref Range    Sodium 136 136 - 145 mmol/L    Potassium 4.0 3.5 - 5.1 mmol/L    Chloride 99 97 - 108 mmol/L    CO2 33 (H) 21 - 32 mmol/L    Anion gap 4 (L) 5 - 15 mmol/L    Glucose 294 (H) 65 - 100 mg/dL    BUN 15 6 - 20 MG/DL    Creatinine 0.79 0.55 - 1.02 MG/DL    BUN/Creatinine ratio 19 12 - 20      GFR est AA >60 >60 ml/min/1.73m2    GFR est non-AA >60 >60 ml/min/1.73m2    Calcium 10.0 8.5 - 10.1 MG/DL    Bilirubin, total 0.3 0.2 - 1.0 MG/DL    ALT (SGPT) 16 12 - 78 U/L    AST (SGOT) 11 (L) 15 - 37 U/L    Alk. phosphatase 88 45 - 117 U/L    Protein, total 7.6 6.4 - 8.2 g/dL    Albumin 2.9 (L) 3.5 - 5.0 g/dL    Globulin 4.7 (H) 2.0 - 4.0 g/dL    A-G Ratio 0.6 (L) 1.1 - 2.2     CK W/ REFLX CKMB    Collection Time: 03/14/19 10:30 PM   Result Value Ref Range    CK 22 (L) 26 - 192 U/L   TROPONIN I    Collection Time: 03/14/19 10:30 PM   Result Value Ref Range    Troponin-I, Qt. <0.05 <0.05 ng/mL   NT-PRO BNP    Collection Time: 03/14/19 10:30 PM   Result Value Ref Range    NT pro- (H) <125 PG/ML   SAMPLES BEING HELD    Collection Time: 03/14/19 10:32 PM   Result Value Ref Range    SAMPLES BEING HELD RD     COMMENT        Add-on orders for these samples will be processed based on acceptable specimen integrity and analyte stability, which may vary by analyte.    INFLUENZA A & B AG (RAPID TEST)    Collection Time: 03/14/19 10:51 PM   Result Value Ref Range    Influenza A Antigen NEGATIVE  NEG      Influenza B Antigen NEGATIVE  NEG     POC G3 - PUL    Collection Time: 03/15/19 12:48 AM   Result Value Ref Range    FIO2 (POC) 36 %    pH (POC) 7.328 (L) 7.35 - 7.45      pCO2 (POC) 65.4 (H) 35.0 - 45.0 MMHG    pO2 (POC) 64 (L) 80 - 100 MMHG    HCO3 (POC) 34.3 (H) 22 - 26 MMOL/L    sO2 (POC) 90 (L) 92 - 97 %    Base excess (POC) 8 mmol/L    Site RIGHT RADIAL      Device: NASAL CANNULA      Flow rate (POC) 4 L/M    Allens test (POC) YES      Specimen type (POC) ARTERIAL      Total resp. rate 23     GLUCOSE, POC    Collection Time: 03/15/19  8:06 AM   Result Value Ref Range    Glucose (POC) 270 (H) 65 - 100 mg/dL    Performed by [facility] ARUN(CON)    EKG, 12 LEAD, INITIAL    Collection Time: 03/15/19  8:35 AM   Result Value Ref Range    Ventricular Rate 95 BPM    Atrial Rate 95 BPM    P-R Interval 178 ms    QRS Duration 88 ms    Q-T Interval 336 ms    QTC Calculation (Bezet) 422 ms    Calculated P Axis 64 degrees    Calculated R Axis 77 degrees    Calculated T Axis 46 degrees    Diagnosis       Normal sinus rhythm  Possible Left atrial enlargement  Low voltage QRS    Confirmed by Leobardo Lara ([id number]) on 3/15/2019 9:17:33 AM     GLUCOSE, POC    Collection Time: 03/15/19 11:49 AM   Result Value Ref Range    Glucose (POC) 313 (H) 65 - 100 mg/dL    Performed by [facility] ARUN(CON)        Imaging:  I have personally reviewed the patients radiographs and have reviewed the reports:  3-14-19: CXR: IMPRESSION: Mild bibasilar interstitial opacities suggest mild pulmonary edema.         Vaishnavi Padilla MD

## 2019-03-15 NOTE — PROGRESS NOTES
0700: Bedside shift change report given to REMINGTON Sigala RN (oncoming nurse) by Barbara Garnica RN (offgoing nurse). Report included the following information SBAR, Kardex and ED Summary     0830: On cardiac monitor, pt in sinus tach sustaining at 150-160. Pt denies any discomfort, CP, SOB or feeling a rapid HR. NP Saloni notified, orders given for metoprolol 2.5 IVP, EKG, cardio consult, and to hold oral 25mg dose metoprolol. Tele notified. 0900: Pt resting comfortably, no complains at this time. 1200: MEWS projected at 3, notified charge nurse, reassessed vitals, MEWS now at 2.    1830: Wound care completed. 1900: Bedside shift change report given to gomez LANDEROS RN (oncoming nurse) by REMINGTON Sigala RN (offgoing nurse). Report included the following information SBAR and Kardex.

## 2019-03-15 NOTE — PROGRESS NOTES
physical Therapy EVALUATION/DISCHARGE  Patient: Chery Slaughter (41 y.o. female)  Date: 3/15/2019  Primary Diagnosis: Acute on chronic respiratory failure with hypoxia (HCC) [J96.21]       Precautions: SOB     ASSESSMENT :  Based on the objective data described below, the patient presents with limitations to activity tolerance , but otherwise at baseline for functional mobility. Cleared for session by RN. Pt reports and appears to be at her baseline mobility status. Required only S due to respiratory monitoring during mobility. I bed mobility, sup>sit, sit<>stand, and amb w/o assist. Pt on 5L of O2. Pt amb 14' x 2 to and from the bathroom with S only, no device, and sats dropped to 80% and recovered to 90% after 3.5 min. Left pt at EOB seated. Pt states she wished to remain there currently and would go sit in the recliner later. RN informed. At this time, pt is mainly limited by her respiratory status. No further skilled PT needs in acute setting. Pt can safely mobilize with S of nursing as tolerated. Further skilled acute physical therapy is not indicated at this time. PLAN :   Discharge Recommendations: None with resumption of aides  Further Equipment Recommendations for Discharge: none     SUBJECTIVE:   Patient stated I am still moving around like I normally do.     OBJECTIVE DATA SUMMARY:   HISTORY:    Past Medical History:   Diagnosis Date    Arthritis     Asthma     Breast lump     CAD (coronary artery disease)     [facility]     COPD [facility]ch[facility] obstructive [facility]) [facility]     [facility]     Hypertension     Morbid obesity with BMI of 70 and over, adult ([facility]     NSTEMI (non-ST elevated myocardial infarction) [facility]     SVT (supraventricular tachycardia) ([facility]      Past Surgical History:   Procedure Laterality Date    CARDIAC SURG PROCEDURE UNLIST      Stents    HX  SECTION      HX ORTHOPAEDIC      HX OTHER SURGICAL      cyst removed from back Prior Level of Function/Home Situation: Pt reports living at home alone but has family help and caregivers that come for 6 hrs each day to assist with ADLs and IADLS. She  walks w/o device independently in the home for distances of ~20' at a time and has assist to enter/exit. She has a rollator that doesn't fit in her home. She sponge bathes per her report. Personal factors and/or comorbidities impacting plan of care:     Home Situation  Home Environment: Private residence  # Steps to Enter: 5  Rails to Enter: Yes  Hand Rails : Left  One/Two Story Residence: One story  Living Alone: Yes(aides daily for 6 hrs)  Support Systems: Family member(s)  Patient Expects to be Discharged to[de-identified] Private residence  Current DME Used/Available at Home: Hospital bed, Walker, rolling, Oxygen, portable, Walker, rollator, Commode, bedside, (electric scooter)    EXAMINATION/PRESENTATION/DECISION MAKING:   Critical Behavior:              Hearing: Auditory  Auditory Impairment: None  Range Of Motion:  AROM: Generally decreased, functional     PROM: Generally decreased, functional           Strength:    Strength: Generally decreased, functional        Tone & Sensation:   Tone: Normal        Sensation: Intact          Coordination:  Coordination: Within functional limits  Vision:      Functional Mobility:  Bed Mobility:  Rolling: Modified independent  Supine to Sit: Modified independent     Scooting: Independent  Transfers:  Sit to Stand: Independent  Stand to Sit: Independent           Balance:   Sitting: Intact; Without support  Standing: Intact; Without support  Ambulation/Gait Training:  Distance (ft): 28 Feet (ft)(14ft x 2  with seated rest)     Ambulation - Level of Assistance: Independent     Gait Description (WDL): Within defined limits           Base of Support: Widened        Step Length: Left shortened;Right shortened           Functional Measure:  Barthel Index:    Bathin  Bladder: 10  Bowels: 10  Groomin  Dressing: 5  Feeding: 10  Mobility: 0  Stairs: 5  Toilet Use: 10  Transfer (Bed to Chair and Back): 15  Total: 70/100       Percentage of impairment   0%   1-19%   20-39%   40-59%   60-79%   80-99%   100%   Barthel Score 0-100 100 99-80 79-60 59-40 20-39 1-19   0     The Barthel ADL Index: Guidelines  1. The index should be used as a record of what a patient does, not as a record of what a patient could do. 2. The main aim is to establish degree of independence from any help, physical or verbal, however minor and for whatever reason. 3. The need for supervision renders the patient not independent. 4. A patient's performance should be established using the best available evidence. Asking the patient, friends/relatives and nurses are the usual sources, but direct observation and common sense are also important. However direct testing is not needed. 5. Usually the patient's performance over the preceding 24-48 hours is important, but occasionally longer periods will be relevant. 6. Middle categories imply that the patient supplies over 50 per cent of the effort. 7. Use of aids to be independent is allowed. Nicki Rossi., Barthel, D.W. ([id number]). Functional evaluation: the Barthel Index. [facility] (14)2. [place] [employer] rickie NORTH HernandesF, Umberto Rush., [facility]., [facility], [facility] (1999). Measuring the change indisability after inpatient rehabilitation; comparison of the responsiveness of the Barthel Index and Functional [place] Measure. Journal of Neurology, Neurosurgery, and Psychiatry, 66(4), 777-808. Abena Noland, N.[email or web address].ERIK, AGUSTO Zarco, & Zack Christian, M.A. (2004.) Assessment of post-stroke quality of life in cost-effectiveness studies: The usefulness of the Barthel Index and the EuroQoL-5D.  Quality of Life Research, 15, 149-91          Physical Therapy Evaluation Charge Determination   History Examination Presentation Decision-Making   MEDIUM  Complexity : 1-2 comorbidities / personal factors will impact the outcome/ POC  LOW Complexity : 1-2 Standardized tests and measures addressing body structure, function, activity limitation and / or participation in recreation  LOW Complexity : Stable, uncomplicated  LOW Complexity : FOTO score of       Based on the above components, the patient evaluation is determined to be of the following complexity level: LOW     Pain:  Pain Scale 1: Numeric (0 - 10)  Pain Intensity 1: 0     Activity Tolerance:   Poor  Please refer to the flowsheet for vital signs taken during this treatment. After treatment:   [x]   Patient left in no apparent distress sitting up EOB  []   Patient left in no apparent distress in bed  [x]   Call bell left within reach  [x]   Nursing notified  []   Caregiver present  []   Bed alarm activated    COMMUNICATION/EDUCATION:   Communication/Collaboration:  [x]   Fall prevention education was provided and the patient/caregiver indicated understanding. [x]   Patient/family have participated as able and agree with findings and recommendations. []   Patient is unable to participate in plan of care at this time.   Findings and recommendations were discussed with: Occupational Therapist and Registered Nurse    Thank you for this referral.  Lina Howell, PT   Time Calculation: 29 mins

## 2019-03-15 NOTE — PROGRESS NOTES
Hospitalist Progress Note    NAME: Louana Primrose   :  1970   MRN:  [id number]       Interim Hospital Summary: 57 y.o. female whom presented on 3/14/2019 with      Assessment / Plan:    Acute on chronic respiratory failure with hypoxia and hypercapnea POA (O2 sats in 80s upon ambulation on 5L O2. 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 Pt has not been using it at home  COPD without exacerbation  NON compliance with medical therapy  - CXR with pulmonary edema    7.32/65.4/64/34.3  - very sedantary life style, not compliant with medical therapy nor with life style modification  - continue with breo and duoneb  - continue with Lasix 40mg IV BID     Daily weight; 181.4kg (400lbs) -> 192.kg (423lbs). I do not believe she gained 23 lbs overnight. Asked nursing staff recheck her weight. Strict I & O, fluid restriction (1500ml/day)     Coronary atherosclerosis of native coronary artery   HTN  SVT / A-fib with RVR  - appreciate cardiology input; cardiology recommends DOAC if arrhythmia recur and start on Dilt drip    Pt converted to SR/ST after one dose of IV BB. Continue with PO BB, ASA  - pt denies any chest pain    DM2  - hgbA1C 8.4 (10/2018)    Continue Lantus and SSI  - pt looked away when attempt to discuss about importance dietary restriction. Intraductal Carcinoma of the right breast  - was diagnosed on 2018. She was seen by Dr. Tammy Reno, general surgery to schedule for right mastectomy, and was suppose to follow up with Dr. Maegan Wiley, heme/onc for further therapy if need.  CA 27.29 was within normal range  - pt lost a follow up due to transportation issue       Code Status: Full   Surrogate Decision Maker: Son and sister in law     DVT Prophylaxis: Lovenox  Subjective:     Chief Complaint / Reason for Physician Visit  \"I feel ok\". Discussed with RN events overnight. Review of Systems:  Symptom Y/N Comments  Symptom Y/N Comments   Fever/Chills n   Chest Pain n    Poor Appetite    Edema     Cough n   Abdominal Pain     Sputum    Joint Pain     SOB/RAZO n   Pruritis/Rash     Nausea/vomit n   Tolerating PT/OT     Diarrhea    Tolerating Diet     Constipation    Other       Could NOT obtain due to:      Objective:     VITALS:   Last 24hrs VS reviewed since prior progress note. Most recent are:  Patient Vitals for the past 24 hrs:   Temp Pulse Resp BP SpO2   03/15/19 1250     95 %   03/15/19 1151 98.2 °F (36.8 °C) (!) 103 18 125/66 96 %   03/15/19 1100     92 %   03/15/19 0836  95 20 (!) 132/94 93 %   03/15/19 0816     90 %   03/15/19 0803 98.1 °F (36.7 °C) 96 20 141/73 94 %   03/15/19 0445 98.6 °F (37 °C) 90 20 134/75 92 %   03/15/19 0300    (!) 152/92 91 %   03/15/19 0208  79  140/87    03/15/19 0130     92 %   03/14/19 2145 98.3 °F (36.8 °C) 95 28 133/85 91 %       Intake/Output Summary (Last 24 hours) at 3/15/2019 1516  Last data filed at 3/15/2019 1412  Gross per 24 hour   Intake 480 ml   Output 2500 ml   Net -2020 ml        PHYSICAL EXAM:  General: Morbidly obese. Alert, cooperative, no acute distress    EENT:  EOMI. Anicteric sclerae. MMM  Resp:  Clear in apex with decrease breath sounds at bases, no wheezing or rales. No accessory muscle use  CV:  Regular  rhythm,  No edema  GI:  Soft, Non distended, Non tender.  +Bowel sounds  Neurologic:  Alert and oriented X 3, normal speech,   Psych:   Good insight. Not anxious nor agitated  Skin:  No rashes.   No jaundice    Reviewed most current lab test results and cultures  YES  Reviewed most current radiology test results   YES  Review and summation of old records today    NO  Reviewed patient's current orders and MAR    YES  PMH/SH reviewed - no change compared to H&P  ________________________________________________________________________  Care Plan discussed with:    Comments   Patient y    Family      RN y    Care Manager y    Consultant                       y Multidiciplinary team rounds were held today with , nursing, pharmacist and clinical coordinator. Patient's plan of care was discussed; medications were reviewed and discharge planning was addressed. ________________________________________________________________________  Total NON critical care TIME:  30   Minutes    Total CRITICAL CARE TIME Spent:   Minutes non procedure based      Comments   >50% of visit spent in counseling and coordination of care     ________________________________________________________________________  Sb Rees NP     Procedures: see electronic medical records for all procedures/Xrays and details which were not copied into this note but were reviewed prior to creation of Plan. LABS:  I reviewed today's most current labs and imaging studies.   Pertinent labs include:  Recent Labs     03/14/19  2230   WBC 8.7   HGB 11.8   HCT 40.3        Recent Labs     03/14/19  2230      K 4.0   CL 99   CO2 33*   *   BUN 15   CREA 0.79   CA 10.0   ALB 2.9*   TBILI 0.3   SGOT 11*   ALT 16       Signed: )Astrid Almazan NP

## 2019-03-15 NOTE — ED PROVIDER NOTES
EMERGENCY DEPARTMENT HISTORY AND PHYSICAL EXAM      Date: 3/14/2019  Patient Name: Jay Segundo    History of Presenting Illness     Chief Complaint   Patient presents with    Shortness of Breath       History Provided By: Patient    HPI: Jay Segundo, 57 y.o. female with PMHx significant for morbid obesity, CHF, HTN, DM, COPD, , presents via EMS to the ED with cc of continued SOB, CP, and Light-headedness [id number]. Pt also notes some increased leg swelling. Per chart review pt was seen in ED on 03/03/19 for asthma exacerbation and was given abx which didn't help her sxs. Pt states she used her inhaler with no relief. Pt states she is on 5L O2 at home. Admits that she has not been compliant with her BiPAP because she \"cannot tolerate it. \"  Pt denies any cough, abd pain, or n/v/d    There are no other complaints, changes, or physical findings at this time. PCP: None    No current facility-administered medications on file prior to encounter. Current Outpatient Medications on File Prior to Encounter   Medication Sig Dispense Refill    albuterol-ipratropium (DUO-NEB) 2.5 mg-0.5 mg/3 ml nebu 3 mL by Nebulization route four (4) times daily. 100 Nebule 1    furosemide (LASIX) 40 mg tablet Take 40 mg by mouth two (2) times a day.  naproxen sodium (ALEVE) 220 mg tablet Take 440 mg by mouth every twelve (12) hours as needed for Pain.  nystatin (MYCOSTATIN) topical cream Apply  to affected area three (3) times daily as needed for Skin Irritation.  nystatin (MYCOSTATIN) powder Apply  to affected area three (3) times daily as needed (skin irratation).  fluticasone (FLONASE) 50 mcg/actuation nasal spray 2 Sprays by Both Nostrils route daily. 1 Bottle 0    insulin glargine (LANTUS) 100 unit/mL injection 40 Units by SubCUTAneous route daily.  metoprolol tartrate (LOPRESSOR) 25 mg tablet Take 25 mg by mouth two (2) times a day.       fluticasone-salmeterol (ADVAIR DISKUS) 500-50 mcg/dose diskus inhaler Take 1 Puff by inhalation every twelve (12) hours.  loratadine (CLARITIN) 10 mg tablet Take 10 mg by mouth daily.  albuterol (VENTOLIN HFA) 90 mcg/actuation inhaler Take 2 Puffs by inhalation every six (6) hours as needed for Wheezing.  cyclobenzaprine (FLEXERIL) 5 mg tablet Take 5 mg by mouth two (2) times a day. flexeriol       aspirin 81 mg chewable tablet Take 81 mg by mouth daily.  hydrOXYzine pamoate (VISTARIL) 50 mg capsule Take 50 mg by mouth every eight (8) hours as needed for Itching.  lovastatin (MEVACOR) 40 mg tablet Take 1 Tab by mouth nightly. 30 Tab 6    glyBURIDE (DIABETA) 5 mg tablet Take 5 mg by mouth two (2) times daily (with meals).  ammonium lactate (LAC-HYDRIN) 12 % topical cream rub in to affected area well 280 g 1    nitroglycerin (NITROSTAT) 0.4 mg SL tablet 1 Tab by SubLINGual route every five (5) minutes as needed for Chest Pain (call 911 if not relieved by 3).  25 Tab 2       Past History     Past Medical History:  Past Medical History:   Diagnosis Date    Arthritis     Asthma     Breast lump     CAD (coronary artery disease)     Congestive heart failure (HCC)     COPD (chronic obstructive pulmonary disease) (HCC)     Diabetes [facility] [address]     Hypertension     Morbid obesity with BMI of 70 and over, adult ([facility] [address] [facility]     NSTEMI (non-ST elevated myocardial infarction) ([facility] [address] [facility]     SVT (supraventricular tachycardia) ([facility] [address] [facility]        Past Surgical History:  Past Surgical History:   Procedure Laterality Date    CARDIAC SURG PROCEDURE UNLIST      Stents    HX  SECTION      HX ORTHOPAEDIC      HX OTHER SURGICAL      cyst removed from back       Family History:  Family History   Problem Relation Age of Onset    Heart Disease Mother     Heart Disease Father     Heart Disease Sister     Breast Cancer Maternal Grandmother     Breast Cancer Paternal Grandmother        Social History:  Social History     Tobacco Use    Smoking status: Former Smoker     Packs/day: 0.25     Types: Cigarettes    Smokeless tobacco: Never Used    Tobacco comment: Patient states \"I  aint smoking no more d*mn cigarettes after yesterday\" 01/26/2018   Substance Use Topics    Alcohol use: No    Drug use: No       Allergies:  No Known Allergies      Review of Systems   Review of Systems   Constitutional: Negative for chills and fever. HENT: Negative for congestion, rhinorrhea and sore throat. Respiratory: Positive for shortness of breath. Negative for cough. Cardiovascular: Positive for chest pain and leg swelling. Gastrointestinal: Negative for abdominal pain, nausea and vomiting. Genitourinary: Negative for dysuria and urgency. Skin: Negative for rash. Neurological: Negative for dizziness, light-headedness and headaches. All other systems reviewed and are negative. Physical Exam   Physical Exam   Constitutional: She is oriented to person, place, and time. She appears well-developed and well-nourished. No distress. morbidly obese   HENT:   Head: Normocephalic and atraumatic. Eyes: Conjunctivae and EOM are normal. Pupils are equal, round, and reactive to light. Neck: Normal range of motion. Neck supple. Cardiovascular: Normal rate, regular rhythm, normal heart sounds and intact distal pulses. Pulmonary/Chest: Effort normal. No stridor. No respiratory distress. Diffusely diminished   Speaking in complete sentences   Abdominal: Soft. Bowel sounds are normal. She exhibits no distension. There is no tenderness. Musculoskeletal: Normal range of motion. She exhibits edema. Neurological: She is alert and oriented to person, place, and time. Skin: Skin is warm and dry. Psychiatric: She has a normal mood and affect. Her behavior is normal.   Nursing note and vitals reviewed.       Diagnostic Study Results     Labs -     Recent Results (from the past 12 hour(s))   EKG, 12 LEAD, INITIAL    Collection Time: 03/14/19  9:49 PM   Result Value Ref Range    Ventricular Rate 89 BPM    Atrial Rate 89 BPM    P-R Interval 182 ms    QRS Duration 82 ms    Q-T Interval 338 ms    QTC Calculation (Bezet) 411 ms    Calculated P Axis 56 degrees    Calculated R Axis 100 degrees    Calculated T Axis 37 degrees    Diagnosis       Normal sinus rhythm  Rightward axis  Low voltage QRS  When compared with ECG of 03-MAR-2019 19:06,  No significant change was found     CBC WITH AUTOMATED DIFF    Collection Time: 03/14/19 10:30 PM   Result Value Ref Range    WBC 8.7 3.6 - 11.0 K/uL    RBC 4.22 3.80 - 5.20 M/uL    HGB 11.8 11.5 - 16.0 g/dL    HCT 40.3 35.0 - 47.0 %    MCV 95.5 80.0 - 99.0 FL    MCH 28.0 26.0 - 34.0 PG    MCHC 29.3 (L) 30.0 - 36.5 g/dL    RDW 13.0 11.5 - 14.5 %    PLATELET 082 140 - 038 K/uL    MPV 11.0 8.9 - 12.9 FL    NRBC 0.0 0  WBC    ABSOLUTE NRBC 0.00 0.00 - 0.01 K/uL    NEUTROPHILS 77 (H) 32 - 75 %    LYMPHOCYTES 11 (L) 12 - 49 %    MONOCYTES 8 5 - 13 %    EOSINOPHILS 3 0 - 7 %    BASOPHILS 0 0 - 1 %    IMMATURE GRANULOCYTES 1 (H) 0.0 - 0.5 %    ABS. NEUTROPHILS 6.8 1.8 - 8.0 K/UL    ABS. LYMPHOCYTES 0.9 0.8 - 3.5 K/UL    ABS. MONOCYTES 0.7 0.0 - 1.0 K/UL    ABS. EOSINOPHILS 0.2 0.0 - 0.4 K/UL    ABS. BASOPHILS 0.0 0.0 - 0.1 K/UL    ABS. IMM. GRANS. 0.0 0.00 - 0.04 K/UL    DF AUTOMATED     METABOLIC PANEL, COMPREHENSIVE    Collection Time: 03/14/19 10:30 PM   Result Value Ref Range    Sodium 136 136 - 145 mmol/L    Potassium 4.0 3.5 - 5.1 mmol/L    Chloride 99 97 - 108 mmol/L    CO2 33 (H) 21 - 32 mmol/L    Anion gap 4 (L) 5 - 15 mmol/L    Glucose 294 (H) 65 - 100 mg/dL    BUN 15 6 - 20 MG/DL    Creatinine 0.79 0.55 - 1.02 MG/DL    BUN/Creatinine ratio 19 12 - 20      GFR est AA >60 >60 ml/min/1.73m2    GFR est non-AA >60 >60 ml/min/1.73m2    Calcium 10.0 8.5 - 10.1 MG/DL    Bilirubin, total 0.3 0.2 - 1.0 MG/DL    ALT (SGPT) 16 12 - 78 U/L    AST (SGOT) 11 (L) 15 - 37 U/L    Alk.  phosphatase 88 45 - 117 U/L    Protein, total 7.6 6.4 - 8.2 g/dL Albumin 2.9 (L) 3.5 - 5.0 g/dL    Globulin 4.7 (H) 2.0 - 4.0 g/dL    A-G Ratio 0.6 (L) 1.1 - 2.2     CK W/ REFLX CKMB    Collection Time: 03/14/19 10:30 PM   Result Value Ref Range    CK 22 (L) 26 - 192 U/L   TROPONIN I    Collection Time: 03/14/19 10:30 PM   Result Value Ref Range    Troponin-I, Qt. <0.05 <0.05 ng/mL   NT-PRO BNP    Collection Time: 03/14/19 10:30 PM   Result Value Ref Range    NT pro- (H) <125 PG/ML   SAMPLES BEING HELD    Collection Time: 03/14/19 10:32 PM   Result Value Ref Range    SAMPLES BEING HELD RD     COMMENT        Add-on orders for these samples will be processed based on acceptable specimen integrity and analyte stability, which may vary by analyte. INFLUENZA A & B AG (RAPID TEST)    Collection Time: 03/14/19 10:51 PM   Result Value Ref Range    Influenza A Antigen NEGATIVE  NEG      Influenza B Antigen NEGATIVE  NEG     POC G3 - PUL    Collection Time: 03/15/19 12:48 AM   Result Value Ref Range    FIO2 (POC) 36 %    pH (POC) 7.328 (L) 7.35 - 7.45      pCO2 (POC) 65.4 (H) 35.0 - 45.0 MMHG    pO2 (POC) 64 (L) 80 - 100 MMHG    HCO3 (POC) 34.3 (H) 22 - 26 MMOL/L    sO2 (POC) 90 (L) 92 - 97 %    Base excess (POC) 8 mmol/L    Site RIGHT RADIAL      Device: NASAL CANNULA      Flow rate (POC) 4 L/M    Allens test (POC) YES      Specimen type (POC) ARTERIAL      Total resp. rate 23         Radiologic Studies -   XR CHEST PORT   Final Result   IMPRESSION: Mild bibasilar interstitial opacities suggest mild pulmonary edema. CT Results  (Last 48 hours)    None        CXR Results  (Last 48 hours)               03/14/19 2246  XR CHEST PORT Final result    Impression:  IMPRESSION: Mild bibasilar interstitial opacities suggest mild pulmonary edema. Narrative:  EXAM:  CR chest portable       INDICATION: Dyspnea       COMPARISON: 3/3/2019. TECHNIQUE: Portable AP upright chest view at 2229 hours       FINDINGS: The cardiomediastinal contours are stable.  There are mild bibasilar   interstitial opacities. There is no significant pleural effusion or   pneumothorax. The bones and upper abdomen are stable. Medical Decision Making   I am the first provider for this patient. I reviewed the vital signs, available nursing notes, past medical history, past surgical history, family history and social history. Vital Signs-Reviewed the patient's vital signs. Patient Vitals for the past 12 hrs:   Temp Pulse Resp BP SpO2   03/15/19 0208  79  140/87    03/15/19 0130     92 %   03/14/19 2145 98.3 °F (36.8 °C) 95 28 133/85 91 %       Pulse Oximetry Analysis - 91% on 5L    Cardiac Monitor:   Rate: 95 bpm    EKG interpretation: (Preliminary)2149  Rhythm: normal sinus rhythm; and regular . Rate (approx.): 89; Axis: right axis deviation; MN interval: normal; QRS interval: normal ; ST/T wave: normal; . Records Reviewed: Nursing Notes and Old Medical Records    Provider Notes (Medical Decision Making): This is a 61-year-old female with history of morbid obesity, chronically on 5-6 L of nasal cannula with BiPAP at night who presents with shortness of breath and chest pain. Patient has had multiple presentations for similar. Lung sounds are diffusely diminished, likely secondary to patient's body habitus. She is able to speak in complete sentences with no increased work of breathing. O2 sats on arrival around 92%. Suspect patient typically runs about this level due to COPD and obesity hypoventilation. Plan for workup with basic labs, EKG, troponin, BNP, chest x-ray, flu swab. Also give DuoNeb and steroids. ED Course:   Initial assessment performed. The patients presenting problems have been discussed, and they are in agreement with the care plan formulated and outlined with them. I have encouraged them to ask questions as they arise throughout their visit. 12:30AM  Patient signed out to Dr. Siena Thibodeaux. Plan for reevaluation following DuoNeb.     CONSULT NOTE:   2:10 AM  Elizabeth Baumgarten, DO spoke with Dr. Juan Manuel eRno,   Specialty: Hospitalist  Discussed pt's hx, disposition, and available diagnostic and imaging results. Reviewed care plans. Consultant will evaluate pt for admission. Written by Kylah Bonilla ED Scribe, as dictated by Elizabeth Baumgarten, DO. Critical Care Time:   0    Disposition:  Admit to hospitalist    Diagnosis     Clinical Impression:   1. Goals of care, counseling/discussion    2. Shortness of breath    3. Debility        Attestations: This note is prepared by Laura Joaquin, acting as Scribe for Jennifer Corey MD.    The scribe's documentation has been prepared under my direction and personally reviewed by me in its entirety. I confirm that the note above accurately reflects all work, treatment, procedures, and medical decision making performed by me, Jennifer Corey MD    This note is prepared by Kylah Bonilla, acting as Scribe for Elizabeth Baumgarten, DO. Elizabeth Baumgarten, DO: The scribe's documentation has been prepared under my direction and personally reviewed by me in its entirety. I confirm that the note above accurately reflects all work, treatment, procedures, and medical decision making performed by me.

## 2019-03-15 NOTE — ED NOTES
TRANSFER - OUT REPORT:    Verbal report given to Norman Zamora RN(name) on [employer]  being transferred to [facility](unit) for routine progression of care       Report consisted of patients Situation, Background, Assessment and   Recommendations(SBAR). Information from the following report(s) SBAR, Kardex, ED Summary, Intake/Output and Recent Results was reviewed with the receiving nurse. Lines:   Peripheral IV 03/14/19 Right Antecubital (Active)   Site Assessment Clean, dry, & intact 3/14/2019 10:37 PM   Phlebitis Assessment 0 3/14/2019 10:37 PM   Infiltration Assessment 0 3/14/2019 10:37 PM   Dressing Status Clean, dry, & intact 3/14/2019 10:37 PM   Dressing Type Tape;Transparent 3/14/2019 10:37 PM   Hub Color/Line Status Green;Flushed;Patent 3/14/2019 10:37 PM   Action Taken Blood drawn 3/14/2019 10:37 PM   Alcohol Cap Used No 3/14/2019 10:37 PM        Opportunity for questions and clarification was provided.       Patient transported with:   O2 @ 5 liters  Registered Nurse  [facility]

## 2019-03-15 NOTE — ED TRIAGE NOTES
56 y/o obese female with PMH of COPD, CHF, CAD, and HTN presents with cc of progressive dyspnea, pleuritic chest pain, and fatigue ongoing since \"being diagnosed with pneumonia 2 Sundays ago\". She reports taking a 5 day course of antibiotics with no improvement in symptoms. On 5-6 liters of 02 around-the-clock. (+) SOB at rest and with exertion, orthopnea, dry cough, fatigue, 6/10 chest pain, intermittent sweats  (-) nausea, loss of appetite, dizziness, palpitations, hemoptysis.

## 2019-03-15 NOTE — PROGRESS NOTES
Occupational Therapy EVALUATION/discharge  Patient: Page Gabriel (7 y.o. female)  Date: 3/15/2019  Primary Diagnosis: Acute on chronic respiratory failure with hypoxia (HCC) [J96.21]       Precautions: montor O2       ASSESSMENT:   Based on the objective data described below, the patient presents with baseline level of mobility and ADLS with decreased O2 sat with activity. Pt is on 5 liters NC 24/7 at baseline and gets assist for the majority of ADLS. Pt was able to mobilize to bathroom with supervision and needed total assist for jae care which is baseline. Limited activity tolerance due SOB and chronic respiratory issues. Pt on 5L of O2. Pt amb 14' x 2 to and from the bathroom with S only, no device, and sats dropped to 80% and recovered to 90% after 3.5 min. Further skilled acute occupational therapy is not indicated at this time. Discharge Recommendations: return to home with continued paid caregiver assist  Further Equipment Recommendations for Discharge: requesting a different hospital bed      SUBJECTIVE:   Patient stated I get help.     OBJECTIVE DATA SUMMARY:   HISTORY:   Past Medical History:   Diagnosis Date    Arthritis     Asthma     Breast lump     CAD (coronary artery disease)     Congestive heart failure (HCC)     COPD (chronic obstructive pulmonary disease) (HCC)     [facility]     Hypertension     Morbid obesity with BMI of 70 and over, adult ([facility]     NSTEMI (non-ST elevated myocardial infarction) ([facility]     SVT (supraventricular tachycardia) ([facility]      Past Surgical History:   Procedure Laterality Date    CARDIAC SURG PROCEDURE UNLIST      Stents    HX  SECTION      HX ORTHOPAEDIC      HX OTHER SURGICAL      cyst removed from back       Prior Level of Function/Environment/Context: performs UB ADLS without assist, max/total assist for LB ADLS on 5 liters NC at baseline    Expanded or extensive additional review of patient history:     [address] [place] [address] Environment: Private residence  # Steps to Enter: 5  Rails to Enter: Yes  [employer] : Left  One/Two Story Residence: One story  Living Alone: Yes(aides daily for 6 hrs)  Support Systems: Family member(s)  Patient Expects to be Discharged to[de-identified] Private residence  Current DME Used/Available at Home: Hospital bed, Walker, rolling, Oxygen, portable, Walker, rollator, Other (comment), Commode, bedside(electric scooter)    Hand dominance: Right    EXAMINATION OF PERFORMANCE DEFICITS:  Cognitive/Behavioral Status:  Neurologic State: Alert  Orientation Level: Oriented X4  Cognition: Appropriate decision making; Appropriate for age attention/concentration; Appropriate safety awareness  Perception: Appears intact  Perseveration: No perseveration noted  Safety/Judgement: Awareness of environment; Fall prevention;Home safety      Hearing: Auditory  Auditory Impairment: None    Vision/Perceptual:    Tracking: Able to track stimulus in all quadrants w/o difficulty                                Range of Motion:    AROM: Generally decreased, functional  PROM: Generally decreased, functional                      Strength:    Strength: Generally decreased, functional                Coordination:  Coordination: Within functional limits            Tone & Sensation:    Tone: Normal  Sensation: Intact                      Balance:  Sitting: Intact; Without support  Standing: Intact; Without support    Functional Mobility and Transfers for ADLs:  Bed Mobility:  Rolling: Modified independent  Supine to Sit: Modified independent  Scooting: Independent    Transfers:  Sit to Stand: Independent  Stand to Sit: Independent  Bed to Chair: Modified independent  Bathroom Mobility:supervision  Toilet Transfer : supervision    ADL Assessment:  Feeding: Independent    Oral Facial Hygiene/Grooming: Independent    Bathing: Maximum assistance    Upper Body Dressing: Setup    Lower Body Dressing: Total assistance    Toileting:  Total assistance ADL Intervention and task modifications:     See assessment  Cognitive Retraining  Safety/Judgement: Awareness of environment; Fall prevention;Home safety      Functional Measure:  Barthel Index:    Bathin  Bladder: 10  Bowels: 10  Groomin  Dressin  Feeding: 10  Mobility: 0  Stairs: 5  Toilet Use: 10  Transfer (Bed to Chair and Back): 15  Total: 70/100        Percentage of impairment   0%   1-19%   20-39%   40-59%   60-79%   80-99%   100%   Barthel Score 0-100 100 99-80 79-60 59-40 20-39 1-19   0     The Barthel ADL Index: Guidelines  1. The index should be used as a record of what a patient does, not as a record of what a patient could do. 2. The main aim is to establish degree of independence from any help, physical or verbal, however minor and for whatever reason. 3. The need for supervision renders the patient not independent. 4. A patient's performance should be established using the best available evidence. Asking the patient, friends/relatives and nurses are the usual sources, but direct observation and common sense are also important. However direct testing is not needed. 5. Usually the patient's performance over the preceding 24-48 hours is important, but occasionally longer periods will be relevant. 6. Middle categories imply that the patient supplies over 50 per cent of the effort. 7. Use of aids to be independent is allowed. Gaston Manley., Barthel, D.W. (7903). Functional evaluation: the Barthel Index. [address] [facility] (14)2. Kirby Cosme, [place], Bob Baxter., Lenord Shone., [place], [address] [place] [address] (). Measuring the change indisability after inpatient rehabilitation; comparison of the responsiveness of the Barthel Index and Functional [place] Measure. Journal of Neurology, Neurosurgery, and Psychiatry, 66(4), 380-732. Piedad Evangelista, N.J.A, Johana Carrington  W.J.M, & Natasha Cooley M.A. (2004.) Assessment of post-stroke quality of life in cost-effectiveness studies:  The usefulness of the Barthel Index and the EuroQoL-5D. Quality of Life Research, 15, 211-93         Occupational Therapy Evaluation Charge Determination   History Examination Decision-Making   HIGH Complexity : Extensive review of history including physical, cognitive and psychosocial history  HIGH Complexity : 5 or more performance deficits relating to physical, cognitive , or psychosocial skils that result in activity limitations and / or participation restrictions HIGH Complexity : Patient presents with comorbidities that affect occupational performance. Signifigant modification of tasks or assistance (eg, physical or verbal) with assessment (s) is necessary to enable patient to complete evaluation       Based on the above components, the patient evaluation is determined to be of the following complexity level: HIGH   Pain:  Pain Scale 1: Numeric (0 - 10)  Pain Intensity 1: 0              Activity Tolerance:     Please refer to the flowsheet for vital signs taken during this treatment. After treatment:   []  Patient left in no apparent distress sitting up in chair  [x]  Patient left in no apparent distress in bed  [x]  Call bell left within reach  [x]  Nursing notified  []  Caregiver present  []  Bed alarm activated    COMMUNICATION/EDUCATION:   Communication/Collaboration:  [x]      Home safety education was provided and the patient/caregiver indicated understanding. [x]      Patient have participated as able and agree with findings and recommendations. []      Patient is unable to participate in plan of care at this time.   Findings and recommendations were discussed with: Physical Therapist, Registered Nurse and patient    YURY Hunt/L  Time Calculation: 28 mins

## 2019-03-15 NOTE — H&P
Hospitalist Admission Note    NAME: Ernesto Cobian   :  1970   MRN:  [id number]     Date/Time:  3/15/2019 2:40 AM    Patient PCP: None  ______________________________________________________________________  Given the patient's current clinical presentation, I have a high level of concern for decompensation if discharged from the emergency department. Complex decision making was performed, which includes reviewing the patient's available past medical records, laboratory results, and x-ray films. My assessment of this patient's clinical condition and my plan of care is as follows. Assessment / Plan:  Acute on chronic respiratory failure with hypoxia and hypercapnea POA (O2 sats in 80s upon ambulation on 5L O2. 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 Recurrent admissions for the same. Will ask palliative care consult  scheduled nebs  CXR with pulmonary edema    Coronary atherosclerosis of native coronary artery   HTN  H/o SVT  Continue ASA/BB    DM2  Continue Lantus and SSI    Code Status: Full   Surrogate Decision Maker: Son and sister in law    DVT Prophylaxis: Lovenox      Subjective:   CHIEF COMPLAINT: shortness of breath    HISTORY OF PRESENT ILLNESS:     Renetta Patel is a 57 y.o.  female who presents with shortness of breath. As per patient, she is been having progressive worsening shortness of breath for past several month. She reports, she has hard time walking as she feel short winded upon ambulation as he O2 sat drops on 5L which she wears at baseline.  Pt also reported difficulty breathing on laying flat on the bed. Pt reported chronic leg swelling. Pt denies any fever, chills, nausea, vomiting, diarrhea, chest pain, cough. In ED pt noted to have pulmonary edema on CXR and pt saturation in 80s upon ambulation per ED report. She also noted mild respiratory acidosis on ABGs. We were asked to admit for work up and evaluation of the above problems. Past Medical History:   Diagnosis Date    Arthritis     Asthma     Breast lump     CAD (coronary artery disease)     Congestive heart failure (HCC)     COPD (chronic obstructive pulmonary disease) (HCC)     [facility] ([facility]     Hypertension     Morbid obesity with BMI of 70 and over, adult ([facility]     NSTEMI (non-ST elevated myocardial infarction) ([facility]     [facility]         Past Surgical History:   Procedure Laterality Date    CARDIAC SURG PROCEDURE UNLIST      Stents    HX  SECTION      HX ORTHOPAEDIC      HX OTHER SURGICAL      cyst removed from back       Social History     Tobacco Use    Smoking status: Former Smoker     Packs/day: 0.25     Types: Cigarettes    Smokeless tobacco: Never Used    Tobacco comment: Patient states \"I  aint smoking no more d*mn cigarettes after yesterday\" 2018   Substance Use Topics    Alcohol use: No        Family History   Problem Relation Age of Onset    Heart Disease Mother     Heart Disease Father     Heart Disease Sister     Breast Cancer Maternal Grandmother     Breast Cancer Paternal Grandmother      No Known Allergies     Prior to Admission medications    Medication Sig Start Date End Date Taking? Authorizing Provider   albuterol-ipratropium (DUO-NEB) 2.5 mg-0.5 mg/3 ml nebu 3 mL by Nebulization route four (4) times daily. 3/3/19   Emma Currie MD   furosemide (LASIX) 40 mg tablet Take 40 mg by mouth two (2) times a day. Landon Raza MD   naproxen sodium (ALEVE) 220 mg tablet Take 440 mg by mouth every twelve (12) hours as needed for Pain. Other, MD Landon   nystatin (MYCOSTATIN) topical cream Apply  to affected area three (3) times daily as needed for Skin Irritation. Other, MD Landon   nystatin (MYCOSTATIN) powder Apply  to affected area three (3) times daily as needed (skin irratation). Other, MD Landon   fluticasone (FLONASE) 50 mcg/actuation nasal spray 2 Sprays by Both Nostrils route daily. 1/6/19   Antione Bermeo MD   insulin glargine (LANTUS) 100 unit/mL injection 40 Units by SubCUTAneous route daily. Provider, Historical   metoprolol tartrate (LOPRESSOR) 25 mg tablet Take 25 mg by mouth two (2) times a day. Provider, Historical   fluticasone-salmeterol (ADVAIR DISKUS) 500-50 mcg/dose diskus inhaler Take 1 Puff by inhalation every twelve (12) hours. Provider, Historical   loratadine (CLARITIN) 10 mg tablet Take 10 mg by mouth daily. Provider, Historical   albuterol (VENTOLIN HFA) 90 mcg/actuation inhaler Take 2 Puffs by inhalation every six (6) hours as needed for Wheezing. Provider, Historical   cyclobenzaprine (FLEXERIL) 5 mg tablet Take 5 mg by mouth two (2) times a day. flexeriol     Provider, Historical   aspirin 81 mg chewable tablet Take 81 mg by mouth daily. Provider, Historical   hydrOXYzine pamoate (VISTARIL) 50 mg capsule Take 50 mg by mouth every eight (8) hours as needed for Itching. Provider, Historical   lovastatin (MEVACOR) 40 mg tablet Take 1 Tab by mouth nightly. 1/14/16   Brody Villalobos NP   glyBURIDE (DIABETA) 5 mg tablet Take 5 mg by mouth two (2) times daily (with meals). Provider, Historical   ammonium lactate (LAC-HYDRIN) 12 % topical cream rub in to affected area well 11/21/14   Anna Song MD   nitroglycerin (NITROSTAT) 0.4 mg SL tablet 1 Tab by SubLINGual route every five (5) minutes as needed for Chest Pain (call 911 if not relieved by 3). 9/12/13   Brody Villalobos NP       REVIEW OF SYSTEMS:     I am not able to complete the review of systems because:    The patient is intubated and sedated    The patient has altered mental status due to his acute medical problems    The patient has baseline aphasia from prior stroke(s)    The patient has baseline dementia and is not reliable historian    The patient is in acute medical distress and unable to provide information           Total of 12 systems reviewed as follows:       POSITIVE= underlined text  Negative = text not underlined  General:  fever, chills, sweats, generalized weakness, weight loss/gain,      loss of appetite   Eyes:    blurred vision, eye pain, loss of vision, double vision  ENT:    rhinorrhea, pharyngitis   Respiratory:   cough, sputum production, SOB, RAZO, wheezing, pleuritic pain   Cardiology:   chest pain, palpitations, orthopnea, PND, edema, syncope   Gastrointestinal:  abdominal pain , N/V, diarrhea, dysphagia, constipation, bleeding   Genitourinary:  frequency, urgency, dysuria, hematuria, incontinence   Muskuloskeletal :  arthralgia, myalgia, back pain  Hematology:  easy bruising, nose or gum bleeding, lymphadenopathy   Dermatological: rash, ulceration, pruritis, color change / jaundice  Endocrine:   hot flashes or polydipsia   Neurological:  headache, dizziness, confusion, focal weakness, paresthesia,     Speech difficulties, memory loss, gait difficulty  Psychological: Feelings of anxiety, depression, agitation    Objective:   VITALS:    Visit Vitals  /87   Pulse 79   Temp 98.3 °F (36.8 °C)   Resp 28   Ht 5' 6\" (1.676 m)   Wt (!) 181.4 kg (400 lb)   SpO2 92%   BMI 64.56 kg/m²       PHYSICAL EXAM:    General:    Alert, cooperative, no distress, appears stated age. HEENT: Atraumatic, anicteric sclerae, pink conjunctivae     No oral ulcers, mucosa moist, throat clear, dentition fair  Neck:  Supple, symmetrical,  thyroid: non tender  Lungs:   Limited auscultation exam due to body habitat  Chest wall:  No tenderness  No Accessory muscle use.   Heart:   Regular  rhythm,  No  murmur   ++++ edema  Abdomen:   Soft, non-tender. Not distended. Bowel sounds normal  Extremities: No cyanosis. No clubbing,      Skin turgor normal, Capillary refill normal, Radial dial pulse 2+  Skin:     Not pale. Not Jaundiced  No rashes   Psych:  Good insight. Not depressed. Not anxious or agitated. Neurologic: EOMs intact. No facial asymmetry. No aphasia or slurred speech. Symmetrical strength, Sensation grossly intact. Alert and oriented X 4.     _______________________________________________________________________  Care Plan discussed with:    Comments   Patient y    Family      RN y    Care Manager                    Consultant:  tete HAWKINS physician   _______________________________________________________________________  Expected  Disposition:   Home with Family    HH/PT/OT/RN y   SNF/LTC    LUIS A    ________________________________________________________________________  TOTAL TIME: 61 Minutes    Critical Care Provided     Minutes non procedure based      Comments    y Reviewed previous records   >50% of visit spent in counseling and coordination of care y Discussion with patient and family and questions answered       ________________________________________________________________________  Signed: Valencia Bhandari MD    Procedures: see electronic medical records for all procedures/Xrays and details which were not copied into this note but were reviewed prior to creation of Plan.     LAB DATA REVIEWED:    Recent Results (from the past 24 hour(s))   EKG, 12 LEAD, INITIAL    Collection Time: 03/14/19  9:49 PM   Result Value Ref Range    Ventricular Rate 89 BPM    Atrial Rate 89 BPM    P-R Interval 182 ms    QRS Duration 82 ms    Q-T Interval 338 ms    QTC Calculation (Bezet) 411 ms    Calculated P Axis 56 degrees    Calculated R Axis 100 degrees    Calculated T Axis 37 degrees    Diagnosis       Normal sinus rhythm  Rightward axis  Low voltage QRS  When compared with ECG of 03-MAR-2019 19:06,  No significant change was found     CBC WITH AUTOMATED DIFF    Collection Time: 03/14/19 10:30 PM   Result Value Ref Range    WBC 8.7 3.6 - 11.0 K/uL    RBC 4.22 3.80 - 5.20 M/uL    HGB 11.8 11.5 - 16.0 g/dL    HCT 40.3 35.0 - 47.0 %    MCV 95.5 80.0 - 99.0 FL    MCH 28.0 26.0 - 34.0 PG    MCHC 29.3 (L) 30.0 - 36.5 g/dL    RDW 13.0 11.5 - 14.5 %    PLATELET 525 756 - 215 K/uL    MPV 11.0 8.9 - 12.9 FL    NRBC 0.0 0  WBC    ABSOLUTE NRBC 0.00 0.00 - 0.01 K/uL    NEUTROPHILS 77 (H) 32 - 75 %    LYMPHOCYTES 11 (L) 12 - 49 %    MONOCYTES 8 5 - 13 %    EOSINOPHILS 3 0 - 7 %    BASOPHILS 0 0 - 1 %    IMMATURE GRANULOCYTES 1 (H) 0.0 - 0.5 %    ABS. NEUTROPHILS 6.8 1.8 - 8.0 K/UL    ABS. LYMPHOCYTES 0.9 0.8 - 3.5 K/UL    ABS. MONOCYTES 0.7 0.0 - 1.0 K/UL    ABS. EOSINOPHILS 0.2 0.0 - 0.4 K/UL    ABS. BASOPHILS 0.0 0.0 - 0.1 K/UL    ABS. IMM. GRANS. 0.0 0.00 - 0.04 K/UL    DF AUTOMATED     METABOLIC PANEL, COMPREHENSIVE    Collection Time: 03/14/19 10:30 PM   Result Value Ref Range    Sodium 136 136 - 145 mmol/L    Potassium 4.0 3.5 - 5.1 mmol/L    Chloride 99 97 - 108 mmol/L    CO2 33 (H) 21 - 32 mmol/L    Anion gap 4 (L) 5 - 15 mmol/L    Glucose 294 (H) 65 - 100 mg/dL    BUN 15 6 - 20 MG/DL    Creatinine 0.79 0.55 - 1.02 MG/DL    BUN/Creatinine ratio 19 12 - 20      GFR est AA >60 >60 ml/min/1.73m2    GFR est non-AA >60 >60 ml/min/1.73m2    Calcium 10.0 8.5 - 10.1 MG/DL    Bilirubin, total 0.3 0.2 - 1.0 MG/DL    ALT (SGPT) 16 12 - 78 U/L    AST (SGOT) 11 (L) 15 - 37 U/L    Alk.  phosphatase 88 45 - 117 U/L    Protein, total 7.6 6.4 - 8.2 g/dL    Albumin 2.9 (L) 3.5 - 5.0 g/dL    Globulin 4.7 (H) 2.0 - 4.0 g/dL    A-G Ratio 0.6 (L) 1.1 - 2.2     CK W/ REFLX CKMB    Collection Time: 03/14/19 10:30 PM   Result Value Ref Range    CK 22 (L) 26 - 192 U/L   TROPONIN I    Collection Time: 03/14/19 10:30 PM   Result Value Ref Range    Troponin-I, Qt. <0.05 <0.05 ng/mL   NT-PRO BNP    Collection Time: 03/14/19 10:30 PM   Result Value Ref Range    NT pro- (H) <125 PG/ML   SAMPLES BEING HELD    Collection Time: 03/14/19 10:32 PM   Result Value Ref Range    SAMPLES BEING HELD RD     COMMENT        Add-on orders for these samples will be processed based on acceptable specimen integrity and analyte stability, which may vary by analyte. INFLUENZA A & B AG (RAPID TEST)    Collection Time: 03/14/19 10:51 PM   Result Value Ref Range    Influenza A Antigen NEGATIVE  NEG      Influenza B Antigen NEGATIVE  NEG     POC G3 - PUL    Collection Time: 03/15/19 12:48 AM   Result Value Ref Range    FIO2 (POC) 36 %    pH (POC) 7.328 (L) 7.35 - 7.45      pCO2 (POC) 65.4 (H) 35.0 - 45.0 MMHG    pO2 (POC) 64 (L) 80 - 100 MMHG    HCO3 (POC) 34.3 (H) 22 - 26 MMOL/L    sO2 (POC) 90 (L) 92 - 97 %    Base excess (POC) 8 mmol/L    Site RIGHT RADIAL      Device: NASAL CANNULA      Flow rate (POC) 4 L/M    Allens test (POC) YES      Specimen type (POC) ARTERIAL      Total resp.  rate 23

## 2019-03-15 NOTE — PROGRESS NOTES
TRANSFER - IN REPORT:    Verbal report received from Violette Grove (name) on Alysha Santiago  being received from (unit) for routine progression of care      Report consisted of patients Situation, Background, Assessment and   Recommendations(SBAR). Information from the following report(s) Kardex and MAR was reviewed with the receiving nurse. Opportunity for questions and clarification was provided. Assessment completed upon patients arrival to unit and care assumed. Bedside and Verbal shift change report given to , RN (oncoming nurse). Report included the following information Kardex. SHIFT SUMMARY:      CONCERNS TO ADDRESS WITH MD:        [facility] NURSING NOTE   Admission Date 3/14/2019   Admission Diagnosis Acute on chronic respiratory failure with hypoxia (HCC) [J96.21]   Consults IP CONSULT TO PALLIATIVE CARE - PROVIDER      Cardiac Monitoring [x] Yes [] No      Purposeful Hourly Rounding [x] Yes    Yu Score Total Score: 2   Yu score 3 or > [x] Bed Alarm [] Avasys [] 1:1 sitter [] Patient refused (Signed refusal form in chart)   Jeffrey Score     Jeffrey score 14 or < [] PMT consult [] Wound Care consult    []  Specialty bed  [] Nutrition consult      Influenza Vaccine             Oxygen needs? [] Room air Oxygen @  []1L    []2L    []3L   []4L    [x]5L   []6L via  NC   Chronic home O2 use?  [] Yes [] No  Perform O2 challenge test and document in progress note using smartphrase (.Homeoxygen)      Last bowel movement        Urinary Catheter             LDAs               Peripheral IV 03/14/19 Right Antecubital (Active)   Site Assessment Clean, dry, & intact 3/14/2019 10:37 PM   Phlebitis Assessment 0 3/14/2019 10:37 PM   Infiltration Assessment 0 3/14/2019 10:37 PM   Dressing Status Clean, dry, & intact 3/14/2019 10:37 PM   Dressing Type Tape;Transparent 3/14/2019 10:37 PM   Hub Color/Line Status Green;Flushed;Patent 3/14/2019 10:37 PM   Action Taken Blood drawn 3/14/2019 10:37 PM   Alcohol Cap Used No 3/14/2019 10:37 PM                         Readmission Risk Assessment Tool Score High Risk            30       Total Score        3 Has Seen PCP in Last 6 Months (Yes=3, No=0)    11 IP Visits Last 12 Months (1-3=4, 4=9, >4=11)    9 Pt. Coverage (Medicare=5 , Medicaid, or Self-Pay=4)    7 Charlson Comorbidity Score (Age + Comorbid Conditions)        Criteria that do not apply:    . Living with Significant Other. Assisted Living. LTAC. SNF.  or   Rehab    Patient Length of Stay (>5 days = 3)       Expected Length of Stay - - -   Actual Length of Stay 0

## 2019-03-15 NOTE — CONSULTS
[address] , [place], [address] [facility]     Date of  Admission: 3/14/2019  9:29 PM     Admission type:Emergency    Consult for: SVT vs afib with RVR  Consult by: hospitalist     Subjective:     Chery Slaughter is a 57 y.o. female admitted for Acute on chronic respiratory failure with hypoxia ([facility] [J96.21]. Admitted with continuing problem of respiratory distress with hypoxia and hypercapnea. She has had multiple admissions within 6 months. Since admission she her breathing has improved, diuresing well. PMH of noncompliance with medications and f/u appts; CAD with stents 2012 and noted  distal LAD, SVT (documentation of afib in past charts), chronic DHF with EF 55%, chronic lymphedema    ECG: SR-ST, no acute changes, neg troponins, NTproBNP 271    This AM developed SVT at 170s, afib with RVR. Converted with IV metoprolol 5mg. Patient states she has had similar palpitations in the past and that it was told it was afib. No documentation in CC of afib.      Cardiac risk factors: family history, dyslipidemia, diabetes mellitus, obesity, sedentary life style, hypertension, stress, post-menopausal.      Patient Active Problem List    Diagnosis Date Noted    CAP (community acquired pneumonia) 01/13/2016     Priority: 2 - Two     Class: Present on Admission    Cough with hemoptysis 01/10/2016     Priority: 2 - Two     Class: Present on Admission    Lymphedema of both lower extremities 01/10/2016     Priority: 3 - Three     Class: Chronic    Acute on chronic respiratory failure with hypoxia ([facility] 03/15/2019    Acute chest pain 01/03/2019    Productive cough 10/22/2018    Other fatigue 10/22/2018    PNA (pneumonia) 09/24/2018    Counseling regarding goals of care 08/23/2018    Acute pancreatitis 08/23/2018    Breast cancer ([facility] 08/23/2018    Intertrigo 08/23/2018    Elevated lipase 08/21/2018    Abdominal pain 08/21/2018    Respiratory failure ([address] 08/19/2018    NSVT (nonsustained ventricular tachycardia) ([address] 07/12/2018    Acute on chronic respiratory failure with hypoxia and hypercapnia (HCC) 01/30/2018    Acute respiratory failure (HCC) 01/22/2018    Multifocal pneumonia 09/27/2017    Shortness of breath 09/24/2017    Maggot infestation 07/25/2017    COPD exacerbation ([address] 10/28/2016    Cellulitis, leg 08/18/2016    Acute respiratory failure with hypoxia and hypercarbia (HCC) 01/10/2016     Class: Present on Admission    SIRS due to infectious process with acute organ dysfunction ([address] 01/10/2016     Class: Acute    Gangrenous toe ([address] 01/09/2016    Type II diabetes mellitus, uncontrolled ([address] 01/09/2016    HTN (hypertension) 27/39/5721    Diastolic CHF, chronic ([address] 01/09/2016    COPD (chronic obstructive pulmonary disease) ([facility] 07/21/2015     Class: Chronic    Sepsis associated hypotension ([address] 07/21/2015    Cellulitis 05/25/2015    Tobacco abuse 01/13/2013    Obesity, morbid (more than 100 lbs over ideal weight or BMI > 40) ([address] 01/13/2013    Hypoxia 10/22/2012    Noncompliance with therapeutic plan 10/22/2012    Chest pain 10/22/2012    GERD (gastroesophageal reflux disease) 10/22/2012    S/P PTCA (percutaneous transluminal coronary angioplasty) 08/22/2012    Coronary atherosclerosis of native coronary artery 08/22/2012    Atrial fibrillation with RVR ([address] 08/18/2012    NSTEMI (non-ST elevated myocardial infarction) ([address] 08/18/2012    Acute on chronic diastolic heart failure ([address] 08/13/2012    Malignant essential hypertension 08/04/2012    Asthma 08/04/2012     Class: Chronic    Obesity hypoventilation syndrome ([address] 08/04/2012    Dyspnea 08/04/2012    Chest pressure 08/04/2012      None  Past Medical History:   Diagnosis Date    Arthritis     Asthma     Breast lump     CAD (coronary artery disease)     Congestive heart failure (HCC)     COPD [facility]ch[facility] obstructive pulmonary disease) ([facility]     Diabetes ([place] [address]     Hypertension     Morbid obesity with BMI of 70 and over, adult ([place] [address]     NSTEMI (non-ST elevated myocardial infarction) ([place] [address]     SVT (supraventricular tachycardia) ([place] [address]       Social History     Socioeconomic History    Marital status: SINGLE     Spouse name: Not on file    Number of children: Not on file    Years of education: Not on file    Highest education level: Not on file   Tobacco Use    Smoking status: Former Smoker     Packs/day: 0.25     Types: Cigarettes    Smokeless tobacco: Never Used    Tobacco comment: Patient states \"I  aint smoking no more d*mn cigarettes after yesterday\" 01/26/2018   Substance and Sexual Activity    Alcohol use: No    Drug use: No   Social History Narrative    ** Merged History Encounter **          No Known Allergies   Family History   Problem Relation Age of Onset    Heart Disease Mother     Heart Disease Father     Heart Disease Sister     Breast Cancer Maternal Grandmother     Breast Cancer Paternal Grandmother       Current Facility-Administered Medications   Medication Dose Route Frequency    furosemide (LASIX) injection 40 mg  40 mg IntraVENous BID    albuterol-ipratropium (DUO-NEB) 2.5 MG-0.5 MG/3 ML  3 mL Nebulization Q4H RT    aspirin chewable tablet 81 mg  81 mg Oral DAILY    fluticasone-vilanterol (BREO ELLIPTA) 200mcg-25mcg/puff  1 Puff Inhalation DAILY    cyclobenzaprine (FLEXERIL) tablet 5 mg  5 mg Oral BID PRN    fluticasone propionate (FLONASE) 50 mcg/actuation nasal spray 2 Spray  2 Spray Both Nostrils DAILY    insulin lispro (HUMALOG) injection   SubCUTAneous AC&HS    glucose chewable tablet 16 g  4 Tab Oral PRN    dextrose (D50W) injection syrg 12.5-25 g  12.5-25 g IntraVENous PRN    glucagon (GLUCAGEN) injection 1 mg  1 mg IntraMUSCular PRN    hydrOXYzine HCl (ATARAX) tablet 50 mg  50 mg Oral Q8H PRN    insulin glargine (LANTUS) injection 40 Units  40 Units SubCUTAneous DAILY    loratadine (CLARITIN) tablet 10 mg  10 mg Oral DAILY    pravastatin (PRAVACHOL) tablet 40 mg  40 mg Oral QHS    metoprolol tartrate (LOPRESSOR) tablet 25 mg  25 mg Oral BID    nitroglycerin (NITROSTAT) tablet 0.4 mg  0.4 mg SubLINGual Q5MIN PRN    nystatin (MYCOSTATIN) 100,000 unit/gram cream   Topical TID PRN    sodium chloride (NS) flush 5-40 mL  5-40 mL IntraVENous Q8H    sodium chloride (NS) flush 5-40 mL  5-40 mL IntraVENous PRN    acetaminophen (TYLENOL) tablet 650 mg  650 mg Oral Q6H PRN    ondansetron (ZOFRAN) injection 4 mg  4 mg IntraVENous Q4H PRN    enoxaparin (LOVENOX) injection 40 mg  40 mg SubCUTAneous Q12H        Review of Symptoms:   11 systems reviewed, negative other than as stated in the HPI        Objective:      Visit Vitals  /66 (BP 1 Location: Left arm, BP Patient Position: Sitting)   Pulse (!) 103   Temp 98.2 °F (36.8 °C)   Resp 18   Ht 5' 6\" (1.676 m)   Wt (!) 192.2 kg (423 lb 11.6 oz)   SpO2 95%   BMI 68.39 kg/m²       Physical:   General: super morbid obese AAF in no acute distress; laying flat in bed  Heart: tachy, no m/S3/JVD, no carotid bruits   Lungs: clear   Abdomen: Soft, +BS, NTND   Extremities: LE vijaya chronic lymphedema, areas are scaled, errythemic and edematous   Neurologic: Grossly normal    Data Review:   Recent Labs     03/14/19  2230   WBC 8.7   HGB 11.8   HCT 40.3        Recent Labs     03/14/19  2230      K 4.0   CL 99   CO2 33*   *   BUN 15   CREA 0.79   CA 10.0   ALB 2.9*   TBILI 0.3   SGOT 11*   ALT 16       Recent Labs     03/14/19  2230   TROIQ <0.05         Intake/Output Summary (Last 24 hours) at 3/15/2019 1328  Last data filed at 3/15/2019 1229  Gross per 24 hour   Intake 480 ml   Output 1500 ml   Net -1020 ml        Cardiographics    Telemetry: ST now; this AM developed SVT/afib with RVR at 160-170s    Echocardiogram: 12/2018  Left ventricle: Systolic function was normal. Ejection fraction was  estimated in the range of 55 % to 60 %. Suboptimal endocardial  visualization limits wall motion analysis. CXRAY:Mild bibasilar interstitial opacities suggest mild pulmonary edema.           Assessment:       Principal Problem:    Acute respiratory failure with hypoxia and hypercarbia (HCC) (1/10/2016)    Active Problems:    Lymphedema of both lower extremities (1/10/2016)      Obesity hypoventilation syndrome ([address] (8/4/2012)      Acute on chronic diastolic heart failure ([address] (8/13/2012)      Atrial fibrillation with RVR ([address] (8/18/2012)      Coronary atherosclerosis of native coronary artery (8/22/2012)      Noncompliance with therapeutic plan (10/22/2012)      COPD (chronic obstructive pulmonary disease) ([address] (7/21/2015)      Obesity, morbid (more than 100 lbs over ideal weight or BMI > 40) ([address] (1/13/2013)      HTN (hypertension) (1/9/2016)      Acute on chronic respiratory failure with hypoxia (HCC) (3/15/2019)         Plan:     SVT vs Afib with RVR:  Rate controlled with IV BB  To continue on PO BB, ASA, Lovenox at DVT dose  Patient does have hx of untreated GEN, hypoxia and hypercapnea, all which can contribute to afib, and echos have been unable to truly acquire accurate pictures/measurements of heart function due to body habitus   Since there is no previous documentation of afib, will assume this is lone afib   Recommend continue on ASA alone  If the arrhythmia should recur, then consider long term DOAC  CHADS2 vasc score 4. If HR again increases, manage with IV BB if BP stable, or start IV Diltiazem. Patient has strong hx of noncompliance with medications and f/u. Thank you for consulting Kylee Nagy NP       [facility]    3/15/2019         Patient seen, examined by me personally. Plan discussed as detailed. Agree with note as outlined by  NP. I confirm findings in history and physical exam. No additional findings noted. Agree with plan as outlined above.      Nicki Nobles MD

## 2019-03-15 NOTE — CONSULTS
Palliative Medicine Consult  [place]: [phone]    Patient Name: Chris Mae  YOB: 1970    Date of Initial Consult: 3/15/2019  Reason for Consult: Care decisions  Requesting Provider: Alex Soriano MD  Primary Care Physician: None     SUMMARY:   Chris Mae is a 57 y.o. with a past history of arthritis, asthma, CAD, CHF, Copd, dm, htn, morbid obesity, nsTEMI, SVT, who was admitted on 3/14/2019 from home with a diagnosis of acute on chronic respiratory failure with hypoxia and hypercapnea. As per patient, she is been having progressive worsening shortness of breath for past several month. She reports, she has hard time walking as she feel short winded upon ambulation as her O2 sat drops on 5L which she wears at baseline. Pt also reported difficulty breathing on laying flat on the bed. Pt reported chronic leg swelling    Current medical issues leading to Palliative Medicine involvement include: goals of care discussion in setting of worsening chronic diseases     PALLIATIVE DIAGNOSES:   1. Goals of care discussion  2. Shortness of breath  3. Debility       PLAN:   1. Patient is known to our team, we have met with her on a few different admissions. She has been here 5 times in 6 months, with 1 additional ED visit that did not result in an admission. She has a poor prognosis, and her ability to manage at home is poor, but her goals are clear and she wants aggressive treatment. 2. I reviewed her AMD that we have on file, and she confirmed that it is accurate and up to date  3. She verbalized that she might need to adjust it in the next 4-6 months, as her primary mpoa might be moving out of state, but for now she wants to keep it the same  4. Her goals are the same, she wants full restorative measures, unless she is in a persistent vegetative state with no hope that she will 'wake up'  5. She shared that she is no longer with visiting physicians, and currently has no pcp  6.  I referred her to the  on the floor to talk with her about resources in the community for transportation, medications, and a pcp that they can set her up with  7. I do not think she is appropriate for a [facility] referral as her difficult with keeping appointments seems to be a transportation issue rather than a physically home bound issue  8. Will sign off on this patient, as her goals are clear and she has the needed documentation  9. Initial consult note routed to primary continuity provider and/or primary health care team members  10. Communicated plan of care with: Palliative Efrain WATTERS Team     GOALS OF CARE / TREATMENT PREFERENCES:     GOALS OF CARE:  Patient/Health Care Proxy Stated Goals: Prolong life    TREATMENT PREFERENCES:   Code Status: Full Code    Advance Care Planning:  [x] The [facility] Interdisciplinary Team has updated the ACP Navigator with Health Care Decision Maker and Patient Capacity      Primary Decision Maker: Dayan Parnell - [phone]    Secondary Decision Maker: Elma Garcia [facility] - [phone]      Medical Interventions: Full interventions     Other Instructions: Other:    As far as possible, the palliative care team has discussed with patient / health care proxy about goals of care / treatment preferences for patient.      HISTORY:     History obtained from: patient, chart    CHIEF COMPLAINT: none    HPI/SUBJECTIVE:    The patient is:   [x] Verbal and participatory  [] Non-participatory due to:     Feels well  Moving around room with little difficulty    Clinical Pain Assessment (nonverbal scale for severity on nonverbal patients):   Clinical Pain Assessment  Severity: 0          Duration: for how long has pt been experiencing pain (e.g., 2 days, 1 month, years)  Frequency: how often pain is an issue (e.g., several times per day, once every few days, constant)     FUNCTIONAL ASSESSMENT:     Palliative Performance Scale (PPS):  PPS: 50 PSYCHOSOCIAL/SPIRITUAL SCREENING:     Palliative IDT has assessed this patient for cultural preferences / practices and a referral made as appropriate to needs (Cultural Services, Patient Advocacy, Ethics, etc.)    Any spiritual / Orthodoxy concerns:  [] Yes /  [x] No    Caregiver Burnout:  [] Yes /  [] No /  [x] No Caregiver Present      Anticipatory grief assessment:   [x] Normal  / [] Maladaptive       ESAS Anxiety: Anxiety: 0    ESAS Depression: Depression: 0        REVIEW OF SYSTEMS:     Positive and pertinent negative findings in ROS are noted above in HPI. The following systems were [x] reviewed / [] unable to be reviewed as noted in HPI  Other findings are noted below. Systems: constitutional, ears/nose/mouth/throat, respiratory, gastrointestinal, genitourinary, musculoskeletal, integumentary, neurologic, psychiatric, endocrine. Positive findings noted below. Modified ESAS Completed by: provider   Fatigue: 0     Depression: 0 Pain: 0   Anxiety: 0 Nausea: 0   Anorexia: 0       Constipation: No     Stool Occurrence(s): 1        PHYSICAL EXAM:     From RN flowsheet:  Wt Readings from Last 3 Encounters:   03/15/19 (!) 423 lb 11.6 oz (192.2 kg)   03/03/19 (!) 412 lb (186.9 kg)   01/16/19 (!) 416 lb (188.7 kg)     Blood pressure 125/66, pulse (!) 103, temperature 98.2 °F (36.8 °C), resp. rate 18, height 5' 6\" (1.676 m), weight (!) 423 lb 11.6 oz (192.2 kg), SpO2 96 %.     Pain Scale 1: Numeric (0 - 10)  Pain Intensity 1: 0                 Last bowel movement, if known:     Constitutional: alert, oriented, in no distress  Eyes: pupils equal, anicteric  ENMT: no nasal discharge, moist mucous membranes  Cardiovascular: significant edema to legs/feet  Respiratory: breathing not labored, symmetric  Gastrointestinal: soft non-tender,   Musculoskeletal: no deformity,   Skin: warm, dry  Neurologic: following commands, moving all extremities  Psychiatric: full affect, no hallucinations  Other:       HISTORY:     Active Problems:    Lymphedema of both lower extremities (1/10/2016)      Obesity hypoventilation syndrome ([address] (2012)      Acute on chronic diastolic heart failure ([address] (2012)      Coronary atherosclerosis of native coronary artery (2012)      COPD (chronic obstructive pulmonary disease) ([address] (2015)      Obesity, morbid (more than 100 lbs over ideal weight or BMI > 40) ([address] (2013)      HTN (hypertension) (2016)      Acute on chronic respiratory failure with hypoxia ([address] (3/15/2019)      Past Medical History:   Diagnosis Date    Arthritis     Asthma     Breast lump     CAD (coronary artery disease)     Congestive heart failure ([address]     [facility]     Diabetes ([facility]     Hypertension     Morbid obesity with BMI of 70 and over, adult ([facility]     NSTEMI (non-ST elevated myocardial infarction) ([address]     SVT (supraventricular tachycardia) ([facility]       Past Surgical History:   Procedure Laterality Date    CARDIAC SURG PROCEDURE UNLIST      Stents    HX  SECTION      HX ORTHOPAEDIC      HX OTHER SURGICAL      cyst removed from back      Family History   Problem Relation Age of Onset    Heart Disease Mother     Heart Disease Father     Heart Disease Sister     Breast Cancer Maternal Grandmother     Breast Cancer Paternal Grandmother       History reviewed, no pertinent family history.   Social History     Tobacco Use    Smoking status: Former Smoker     Packs/day: 0.25     Types: Cigarettes    Smokeless tobacco: Never Used    Tobacco comment: Patient states \"I  aint smoking no more d*mn cigarettes after yesterday\" 2018   Substance Use Topics    Alcohol use: No     No Known Allergies   Current Facility-Administered Medications   Medication Dose Route Frequency    furosemide (LASIX) injection 40 mg  40 mg IntraVENous BID    albuterol-ipratropium (DUO-NEB) 2.5 MG-0.5 MG/3 ML  3 mL Nebulization Q4H RT    aspirin chewable tablet 81 mg  81 mg Oral DAILY    fluticasone-vilanterol (BREO ELLIPTA) 200mcg-25mcg/puff  1 Puff Inhalation DAILY    cyclobenzaprine (FLEXERIL) tablet 5 mg  5 mg Oral BID PRN    fluticasone propionate (FLONASE) 50 mcg/actuation nasal spray 2 Spray  2 Spray Both Nostrils DAILY    insulin lispro (HUMALOG) injection   SubCUTAneous AC&HS    glucose chewable tablet 16 g  4 Tab Oral PRN    dextrose (D50W) injection syrg 12.5-25 g  12.5-25 g IntraVENous PRN    glucagon (GLUCAGEN) injection 1 mg  1 mg IntraMUSCular PRN    hydrOXYzine HCl (ATARAX) tablet 50 mg  50 mg Oral Q8H PRN    insulin glargine (LANTUS) injection 40 Units  40 Units SubCUTAneous DAILY    loratadine (CLARITIN) tablet 10 mg  10 mg Oral DAILY    pravastatin (PRAVACHOL) tablet 40 mg  40 mg Oral QHS    metoprolol tartrate (LOPRESSOR) tablet 25 mg  25 mg Oral BID    nitroglycerin (NITROSTAT) tablet 0.4 mg  0.4 mg SubLINGual Q5MIN PRN    nystatin (MYCOSTATIN) 100,000 unit/gram cream   Topical TID PRN    sodium chloride (NS) flush 5-40 mL  5-40 mL IntraVENous Q8H    sodium chloride (NS) flush 5-40 mL  5-40 mL IntraVENous PRN    acetaminophen (TYLENOL) tablet 650 mg  650 mg Oral Q6H PRN    ondansetron (ZOFRAN) injection 4 mg  4 mg IntraVENous Q4H PRN    enoxaparin (LOVENOX) injection 40 mg  40 mg SubCUTAneous Q12H          LAB AND IMAGING FINDINGS:     Lab Results   Component Value Date/Time    WBC 8.7 03/14/2019 10:30 PM    HGB 11.8 03/14/2019 10:30 PM    PLATELET 583 35/54/9500 10:30 PM     Lab Results   Component Value Date/Time    Sodium 136 03/14/2019 10:30 PM    Potassium 4.0 03/14/2019 10:30 PM    Chloride 99 03/14/2019 10:30 PM    CO2 33 (H) 03/14/2019 10:30 PM    BUN 15 03/14/2019 10:30 PM    Creatinine 0.79 03/14/2019 10:30 PM    Calcium 10.0 03/14/2019 10:30 PM    Magnesium 2.3 10/25/2018 04:15 PM    Phosphorus 2.6 10/25/2018 04:15 PM      Lab Results   Component Value Date/Time    AST (SGOT) 11 (L) 03/14/2019 10:30 PM    Alk. phosphatase 88 03/14/2019 10:30 PM    Protein, total 7.6 03/14/2019 10:30 PM    Albumin 2.9 (L) 03/14/2019 10:30 PM    Globulin 4.7 (H) 03/14/2019 10:30 PM     Lab Results   Component Value Date/Time    INR 1.0 07/09/2018 03:15 AM    Prothrombin time 10.2 07/09/2018 03:15 AM    aPTT 22.7 07/09/2018 03:15 AM      No results found for: IRON, FE, TIBC, IBCT, PSAT, FERR   Lab Results   Component Value Date/Time    pH 7.40 01/08/2019 04:51 AM    PCO2 59 (H) 01/08/2019 04:51 AM    PO2 61 (L) 01/08/2019 04:51 AM     No components found for: [place]   Lab Results   Component Value Date/Time    CK 42 07/09/2018 12:06 AM    CK - MB <1.0 07/09/2018 12:06 AM                Total time:   Counseling / coordination time, spent as noted above:   > 50% counseling / coordination?:     Prolonged service was provided for  []30 min   []75 min in face to face time in the presence of the patient, spent as noted above. Time Start:   Time End:   Note: this can only be billed with 63738 (initial) or 51006 (follow up). If multiple start / stop times, list each separately.

## 2019-03-15 NOTE — ED NOTES
Pt. Ambulated 20 feet to and from the bathroom and de-sated to 80% while wearing 6 liters of oxygen.

## 2019-03-16 LAB
ALBUMIN SERPL-MCNC: 2.9 G/DL (ref 3.5–5)
ALBUMIN/GLOB SERPL: 0.6 {RATIO} (ref 1.1–2.2)
ALP SERPL-CCNC: 80 U/L (ref 45–117)
ALT SERPL-CCNC: 14 U/L (ref 12–78)
ANION GAP SERPL CALC-SCNC: 4 MMOL/L (ref 5–15)
AST SERPL-CCNC: 9 U/L (ref 15–37)
BASOPHILS # BLD: 0 K/UL (ref 0–0.1)
BASOPHILS NFR BLD: 0 % (ref 0–1)
BILIRUB SERPL-MCNC: 0.4 MG/DL (ref 0.2–1)
BUN SERPL-MCNC: 13 MG/DL (ref 6–20)
BUN/CREAT SERPL: 16 (ref 12–20)
CALCIUM SERPL-MCNC: 10.1 MG/DL (ref 8.5–10.1)
CHLORIDE SERPL-SCNC: 99 MMOL/L (ref 97–108)
CO2 SERPL-SCNC: 36 MMOL/L (ref 21–32)
CREAT SERPL-MCNC: 0.81 MG/DL (ref 0.55–1.02)
DIFFERENTIAL METHOD BLD: ABNORMAL
EOSINOPHIL # BLD: 0.1 K/UL (ref 0–0.4)
EOSINOPHIL NFR BLD: 1 % (ref 0–7)
ERYTHROCYTE [DISTWIDTH] IN BLOOD BY AUTOMATED COUNT: 13.5 % (ref 11.5–14.5)
GLOBULIN SER CALC-MCNC: 4.6 G/DL (ref 2–4)
GLUCOSE BLD STRIP.AUTO-MCNC: 147 MG/DL (ref 65–100)
GLUCOSE BLD STRIP.AUTO-MCNC: 182 MG/DL (ref 65–100)
GLUCOSE BLD STRIP.AUTO-MCNC: 184 MG/DL (ref 65–100)
GLUCOSE BLD STRIP.AUTO-MCNC: 218 MG/DL (ref 65–100)
GLUCOSE SERPL-MCNC: 170 MG/DL (ref 65–100)
HCT VFR BLD AUTO: 40.2 % (ref 35–47)
HGB BLD-MCNC: 12 G/DL (ref 11.5–16)
IMM GRANULOCYTES # BLD AUTO: 0 K/UL (ref 0–0.04)
IMM GRANULOCYTES NFR BLD AUTO: 0 % (ref 0–0.5)
LYMPHOCYTES # BLD: 1.5 K/UL (ref 0.8–3.5)
LYMPHOCYTES NFR BLD: 16 % (ref 12–49)
MAGNESIUM SERPL-MCNC: 2.1 MG/DL (ref 1.6–2.4)
MCH RBC QN AUTO: 28.2 PG (ref 26–34)
MCHC RBC AUTO-ENTMCNC: 29.9 G/DL (ref 30–36.5)
MCV RBC AUTO: 94.4 FL (ref 80–99)
MONOCYTES # BLD: 0.8 K/UL (ref 0–1)
MONOCYTES NFR BLD: 8 % (ref 5–13)
NEUTS SEG # BLD: 7 K/UL (ref 1.8–8)
NEUTS SEG NFR BLD: 75 % (ref 32–75)
NRBC # BLD: 0 K/UL (ref 0–0.01)
NRBC BLD-RTO: 0 PER 100 WBC
PHOSPHATE SERPL-MCNC: 2.5 MG/DL (ref 2.6–4.7)
PLATELET # BLD AUTO: 175 K/UL (ref 150–400)
PMV BLD AUTO: 11.4 FL (ref 8.9–12.9)
POTASSIUM SERPL-SCNC: 3.5 MMOL/L (ref 3.5–5.1)
PROT SERPL-MCNC: 7.5 G/DL (ref 6.4–8.2)
RBC # BLD AUTO: 4.26 M/UL (ref 3.8–5.2)
SERVICE CMNT-IMP: ABNORMAL
SODIUM SERPL-SCNC: 139 MMOL/L (ref 136–145)
WBC # BLD AUTO: 9.4 K/UL (ref 3.6–11)

## 2019-03-16 PROCEDURE — 36415 COLL VENOUS BLD VENIPUNCTURE: CPT

## 2019-03-16 PROCEDURE — 94660 CPAP INITIATION&MGMT: CPT

## 2019-03-16 PROCEDURE — 74011250637 HC RX REV CODE- 250/637: Performed by: INTERNAL MEDICINE

## 2019-03-16 PROCEDURE — 94640 AIRWAY INHALATION TREATMENT: CPT

## 2019-03-16 PROCEDURE — 65660000000 HC RM CCU STEPDOWN

## 2019-03-16 PROCEDURE — 74011000250 HC RX REV CODE- 250: Performed by: NURSE PRACTITIONER

## 2019-03-16 PROCEDURE — 83735 ASSAY OF MAGNESIUM: CPT

## 2019-03-16 PROCEDURE — 5A09357 ASSISTANCE WITH RESPIRATORY VENTILATION, LESS THAN 24 CONSECUTIVE HOURS, CONTINUOUS POSITIVE AIRWAY PRESSURE: ICD-10-PCS | Performed by: INTERNAL MEDICINE

## 2019-03-16 PROCEDURE — 94760 N-INVAS EAR/PLS OXIMETRY 1: CPT

## 2019-03-16 PROCEDURE — 77010033678 HC OXYGEN DAILY

## 2019-03-16 PROCEDURE — 82962 GLUCOSE BLOOD TEST: CPT

## 2019-03-16 PROCEDURE — 74011000250 HC RX REV CODE- 250: Performed by: INTERNAL MEDICINE

## 2019-03-16 PROCEDURE — 74011250636 HC RX REV CODE- 250/636: Performed by: INTERNAL MEDICINE

## 2019-03-16 PROCEDURE — 80053 COMPREHEN METABOLIC PANEL: CPT

## 2019-03-16 PROCEDURE — 74011636637 HC RX REV CODE- 636/637: Performed by: INTERNAL MEDICINE

## 2019-03-16 PROCEDURE — 84100 ASSAY OF PHOSPHORUS: CPT

## 2019-03-16 PROCEDURE — 85025 COMPLETE CBC W/AUTO DIFF WBC: CPT

## 2019-03-16 RX ORDER — METAXALONE 800 MG/1
400 TABLET ORAL 3 TIMES DAILY
Status: DISCONTINUED | OUTPATIENT
Start: 2019-03-16 | End: 2019-03-17 | Stop reason: HOSPADM

## 2019-03-16 RX ORDER — LIDOCAINE 4 G/100G
1 PATCH TOPICAL EVERY 24 HOURS
Status: DISCONTINUED | OUTPATIENT
Start: 2019-03-16 | End: 2019-03-17 | Stop reason: HOSPADM

## 2019-03-16 RX ADMIN — FUROSEMIDE 40 MG: 10 INJECTION, SOLUTION INTRAMUSCULAR; INTRAVENOUS at 08:55

## 2019-03-16 RX ADMIN — Medication 10 ML: at 06:00

## 2019-03-16 RX ADMIN — PRAVASTATIN SODIUM 40 MG: 40 TABLET ORAL at 22:06

## 2019-03-16 RX ADMIN — METAXALONE 400 MG: 800 TABLET ORAL at 22:06

## 2019-03-16 RX ADMIN — IPRATROPIUM BROMIDE AND ALBUTEROL SULFATE 3 ML: .5; 3 SOLUTION RESPIRATORY (INHALATION) at 11:44

## 2019-03-16 RX ADMIN — Medication 10 ML: at 17:25

## 2019-03-16 RX ADMIN — ENOXAPARIN SODIUM 40 MG: 40 INJECTION SUBCUTANEOUS at 22:05

## 2019-03-16 RX ADMIN — INSULIN LISPRO 2 UNITS: 100 INJECTION, SOLUTION INTRAVENOUS; SUBCUTANEOUS at 22:05

## 2019-03-16 RX ADMIN — LORATADINE 10 MG: 10 TABLET ORAL at 08:54

## 2019-03-16 RX ADMIN — INSULIN LISPRO 2 UNITS: 100 INJECTION, SOLUTION INTRAVENOUS; SUBCUTANEOUS at 08:55

## 2019-03-16 RX ADMIN — INSULIN GLARGINE 40 UNITS: 100 INJECTION, SOLUTION SUBCUTANEOUS at 08:55

## 2019-03-16 RX ADMIN — ENOXAPARIN SODIUM 40 MG: 40 INJECTION SUBCUTANEOUS at 08:56

## 2019-03-16 RX ADMIN — Medication: at 12:51

## 2019-03-16 RX ADMIN — METOPROLOL TARTRATE 25 MG: 25 TABLET ORAL at 08:54

## 2019-03-16 RX ADMIN — FLUTICASONE FUROATE AND VILANTEROL TRIFENATATE 1 PUFF: 200; 25 POWDER RESPIRATORY (INHALATION) at 08:58

## 2019-03-16 RX ADMIN — Medication 10 ML: at 22:05

## 2019-03-16 RX ADMIN — MICONAZOLE NITRATE: 20 CREAM TOPICAL at 08:57

## 2019-03-16 RX ADMIN — METAXALONE 400 MG: 800 TABLET ORAL at 17:24

## 2019-03-16 RX ADMIN — CYCLOBENZAPRINE HYDROCHLORIDE 5 MG: 10 TABLET, FILM COATED ORAL at 08:54

## 2019-03-16 RX ADMIN — INSULIN LISPRO 2 UNITS: 100 INJECTION, SOLUTION INTRAVENOUS; SUBCUTANEOUS at 17:23

## 2019-03-16 RX ADMIN — IPRATROPIUM BROMIDE AND ALBUTEROL SULFATE 3 ML: .5; 3 SOLUTION RESPIRATORY (INHALATION) at 07:46

## 2019-03-16 RX ADMIN — IPRATROPIUM BROMIDE AND ALBUTEROL SULFATE 3 ML: .5; 3 SOLUTION RESPIRATORY (INHALATION) at 16:41

## 2019-03-16 RX ADMIN — METOPROLOL TARTRATE 25 MG: 25 TABLET ORAL at 17:24

## 2019-03-16 RX ADMIN — NYSTATIN: 100000 CREAM TOPICAL at 12:51

## 2019-03-16 RX ADMIN — FLUTICASONE PROPIONATE 2 SPRAY: 50 SPRAY, METERED NASAL at 08:57

## 2019-03-16 RX ADMIN — FUROSEMIDE 40 MG: 10 INJECTION, SOLUTION INTRAMUSCULAR; INTRAVENOUS at 17:22

## 2019-03-16 RX ADMIN — INSULIN LISPRO 2 UNITS: 100 INJECTION, SOLUTION INTRAVENOUS; SUBCUTANEOUS at 12:49

## 2019-03-16 RX ADMIN — ASPIRIN 81 MG 81 MG: 81 TABLET ORAL at 08:54

## 2019-03-16 RX ADMIN — IPRATROPIUM BROMIDE AND ALBUTEROL SULFATE 3 ML: .5; 3 SOLUTION RESPIRATORY (INHALATION) at 01:44

## 2019-03-16 RX ADMIN — MICONAZOLE NITRATE: 20 CREAM TOPICAL at 12:51

## 2019-03-16 RX ADMIN — IPRATROPIUM BROMIDE AND ALBUTEROL SULFATE 3 ML: .5; 3 SOLUTION RESPIRATORY (INHALATION) at 20:45

## 2019-03-16 NOTE — PROGRESS NOTES
Cardiac Electrophysiology Progress Note            [address], [address] [facility]  [phone]    3/16/2019 9:47 AM    Admit Date: 3/14/2019    Admit Diagnosis: Acute on chronic respiratory failure with hypoxia (HCC) [J96.21]    Subjective:     Zen Quiroga   denies chest pain, chest pressure/discomfort. Complains of lower back pain. Reports improvement in lower extremity edema and dyspnea.      Visit Vitals  /76 (BP 1 Location: Left arm, BP Patient Position: Lying left side) Comment (BP 1 Location): lower   Pulse 89   Temp 97.4 °F (36.3 °C)   Resp 20   Ht 5' 6\" (1.676 m)   Wt (!) 414 lb 4.8 oz (187.9 kg)   SpO2 93%   BMI 66.87 kg/m²     Current Facility-Administered Medications   Medication Dose Route Frequency    furosemide (LASIX) injection 40 mg  40 mg IntraVENous BID    albuterol-ipratropium (DUO-NEB) 2.5 MG-0.5 MG/3 ML  3 mL Nebulization Q4H RT    aspirin chewable tablet 81 mg  81 mg Oral DAILY    fluticasone-vilanterol (BREO ELLIPTA) 200mcg-25mcg/puff  1 Puff Inhalation DAILY    cyclobenzaprine (FLEXERIL) tablet 5 mg  5 mg Oral BID PRN    fluticasone propionate (FLONASE) 50 mcg/actuation nasal spray 2 Spray  2 Spray Both Nostrils DAILY    insulin lispro (HUMALOG) injection   SubCUTAneous AC&HS    glucose chewable tablet 16 g  4 Tab Oral PRN    dextrose (D50W) injection syrg 12.5-25 g  12.5-25 g IntraVENous PRN    glucagon (GLUCAGEN) injection 1 mg  1 mg IntraMUSCular PRN    hydrOXYzine HCl (ATARAX) tablet 50 mg  50 mg Oral Q8H PRN    insulin glargine (LANTUS) injection 40 Units  40 Units SubCUTAneous DAILY    loratadine (CLARITIN) tablet 10 mg  10 mg Oral DAILY    pravastatin (PRAVACHOL) tablet 40 mg  40 mg Oral QHS    metoprolol tartrate (LOPRESSOR) tablet 25 mg  25 mg Oral BID    nitroglycerin (NITROSTAT) tablet 0.4 mg  0.4 mg SubLINGual Q5MIN PRN    nystatin (MYCOSTATIN) 100,000 unit/gram cream   Topical TID PRN    sodium chloride (NS) flush 5-40 mL  5-40 mL IntraVENous Q8H    sodium chloride (NS) flush 5-40 mL  5-40 mL IntraVENous PRN    acetaminophen (TYLENOL) tablet 650 mg  650 mg Oral Q6H PRN    ondansetron (ZOFRAN) injection 4 mg  4 mg IntraVENous Q4H PRN    enoxaparin (LOVENOX) injection 40 mg  40 mg SubCUTAneous Q12H    zinc oxide-cod liver oil (DESITIN) 40 % paste   Topical PRN    miconazole (SECURA) 2 % extra thick cream   Topical DAILY    ammonium lactate (LAC-HYDRIN) 12 % lotion   Topical BID         Objective:      Visit Vitals  /76 (BP 1 Location: Left arm, BP Patient Position: Lying left side)   Pulse 89   Temp 97.4 °F (36.3 °C)   Resp 20   Ht 5' 6\" (1.676 m)   Wt (!) 414 lb 4.8 oz (187.9 kg)   SpO2 93%   BMI 66.87 kg/m²       Physical Exam:  Abdomen: soft, non-tender  Extremities: +++ edema  Heart: distant sounds due to body habitus,regular rate and rhythm  Lungs: decreased in lower lobes.  No wheezing, rhonchi  Pulses: 2+ and symmetric    Data Review:   Labs:    Recent Labs     03/16/19  0531 03/14/19  2230   WBC 9.4 8.7   HGB 12.0 11.8   HCT 40.2 40.3    155     Recent Labs     03/16/19  0531 03/14/19  2230    136   K 3.5 4.0   CL 99 99   CO2 36* 33*   * 294*   BUN 13 15   CREA 0.81 0.79   CA 10.1 10.0   MG 2.1  --    PHOS 2.5*  --    ALB 2.9* 2.9*   TBILI 0.4 0.3   SGOT 9* 11*   ALT 14 16       Recent Labs     03/14/19  2230   TROIQ <0.05         Intake/Output Summary (Last 24 hours) at 3/16/2019 0947  Last data filed at 3/16/2019 6051  Gross per 24 hour   Intake 480 ml   Output 3350 ml   Net -2870 ml        Telemetry: sinus rhythm, PVCs ventricular rate 88    Assessment:     Principal Problem:    Acute respiratory failure with hypoxia and hypercarbia (HCC) (1/10/2016)    Active Problems:    Lymphedema of both lower extremities (1/10/2016)      Obesity hypoventilation syndrome ([facility] (8/4/2012)      Acute on chronic diastolic heart failure ([facility] (8/13/2012)      Atrial fibrillation with RVR ([facility] (8/18/2012)      Coronary atherosclerosis of native coronary artery (8/22/2012)      Noncompliance with therapeutic plan (10/22/2012)      COPD (chronic obstructive pulmonary disease) ([facility] (7/21/2015)      Obesity, morbid (more than 100 lbs over ideal weight or BMI > 40) ([facility] (1/13/2013)      HTN (hypertension) (1/9/2016)      Acute on chronic respiratory failure with hypoxia ([facility] (3/15/2019)        Plan:     Marija Wakefield is a 57year old morbidly obese female with HTN, GEN and acute resp failure. Cardiology was consulted for svt vs af with RVR. She remains in sinus rhythm today. Continue on PO BB, ASA, Lovenox at DVT dose  Patient does have hx of untreated GEN, hypoxia and hypercapnea, all which can contribute to afib, and echos have been unable to truly acquire accurate pictures/measurements of heart function due to body habitus   Since there is no previous documentation of afib, will assume this is lone afib. Recommend continue on ASA alone. Cardiology will sign off. Call with JAVIER Sage  Patient seen and examined by me with nurse practitioner. I personally performed all components of the history, physical, and medical decision making and agree with the assessment and plan with minor modifications as noted. Lone AF secondary to morbid obesity. Cont asa/bb. Will sign off.     Serena Hood MD, [facility]        3/16/2019  9:47 AM

## 2019-03-16 NOTE — PROGRESS NOTES
Report received from Meda Spatz, [place]. Introduced myself as primary RN. Assumed care of patient. Instructed patient to call for assistance prior to getting up. Pt verbalized understanding. Call bell within reach. 1:00 PM Wound care performed per orders. Patient tolerated well. Bedside and Verbal shift change report given to Afia (oncoming nurse) by Morris Sanchez (offgoing nurse). Report included the following information SBAR, Kardex, MAR, Recent Results, Med Rec Status and Cardiac Rhythm NSR.

## 2019-03-16 NOTE — PROGRESS NOTES
ADULT PROTOCOL: JET AEROSOL ASSESSMENT    Patient  Cecelia Green     57 y.o.   female     3/16/2019  4:44 AM    Breath Sounds Pre Procedure: Right Breath Sounds: Scattered wheezing                               Left Breath Sounds: Scattered wheezing    Breath Sounds Post Procedure: Right Breath Sounds: Scattered wheezing                                 Left Breath Sounds: Scattered wheezing    Breathing pattern: Pre procedure Breathing Pattern: Regular          Post procedure Breathing Pattern: Regular    Heart Rate: Pre procedure Pulse: 96           Post procedure Pulse: 98    Resp Rate: Pre procedure Respirations: 20           Post procedure Respirations: 20    Peak Flow: Pre bronchodilator   n/a          Post bronchodilator   n/a    Incentive Spirometry:   n/a      n/a    Cough: Pre procedure Cough: Non-productive               Post procedure Cough: Non-productive    Sputum: Pre procedure  none                 Post procedure  none    Oxygen: O2 Device: BIPAP   Flow rate (L/min) 5     Changed: NO    SpO2: Pre procedure SpO2: 94 %   with oxygen              Post procedure SpO2: 94 %  with oxygen    Nebulizer Therapy: Current medications Aerosolized Medications: DuoNeb      Changed: NO    Smoking History: yes    Problem List:   Patient Active Problem List   Diagnosis Code    Malignant essential hypertension I10    Asthma J45.909    Obesity hypoventilation syndrome ([facility]) E66.2    Dyspnea R06.00    Chest pressure R07.89    Acute on chronic diastolic heart failure ([facility]) I50.33    Atrial fibrillation with RVR ([facility]) I48.91    NSTEMI (non-ST elevated myocardial infarction) ([facility]) I21.4    S/P PTCA (percutaneous transluminal coronary angioplasty) Z98.61    Coronary atherosclerosis of native coronary artery I25.10    Hypoxia R09.02    Noncompliance with therapeutic plan Z91.11    Chest pain R07.9    GERD (gastroesophageal reflux disease) K21.9    Acute respiratory failure with hypoxia and hypercarbia ([facility]) J96.01, J96.02    COPD (chronic obstructive pulmonary disease) ([facility]) J44.9    Tobacco abuse Z72.0    Obesity, morbid (more than 100 lbs over ideal weight or BMI > 40) ([facility]) E66.01    Cellulitis L03.90    SIRS due to infectious process with acute organ dysfunction ([facility]) A41.9, R65.20    Sepsis associated hypotension ([facility]) A41.9, I95.9    Gangrenous toe ([facility]) I96    Type II diabetes mellitus, uncontrolled ([facility]) E11.65    HTN (hypertension) W28    Diastolic CHF, chronic ([facility]) I50.32    Cough with hemoptysis R04.2    Lymphedema of both lower extremities I89.0    CAP (community acquired pneumonia) J18.9    Cellulitis, leg L03.119    COPD exacerbation ([facility]) J44.1    Maggot infestation B87.9    Shortness of breath R06.02    Multifocal pneumonia J18.9    Acute respiratory failure ([facility]) J96.00    Acute on chronic respiratory failure with hypoxia and hypercapnia ([facility]) J96.21, J96.22    NSVT (nonsustained ventricular tachycardia) ([facility]) I47.2    Respiratory failure ([facility]) J96.90    Elevated lipase R74.8    Abdominal pain R10.9    Counseling regarding goals of care Z71.89    Acute pancreatitis K85.90    Breast cancer ([facility]) C50.919    Intertrigo L30.4    PNA (pneumonia) J18.9    Productive cough R05    Other fatigue R53.83    Acute chest pain R07.9    Acute on chronic respiratory failure with hypoxia ([facility] J96.21       Respiratory Therapist: Mihir García RT

## 2019-03-16 NOTE — PROGRESS NOTES
Hospitalist Progress Note    NAME: Aide Malhotra   :  1970   MRN:  [id number]       Interim Hospital Summary: 57 y.o. female whom presented on 3/14/2019 with      Assessment / Plan:   Pt started conversation with c/o past and present care she has received/receving so far. She was diagnosed with breast cancer last , we arranged appointments with general surgeon for right mastectomy and following up with heme/onc. She was not able to make any of follow up visit due to transportation issue. She states, \"well, no one trying to help me out. I have to do everything. \" She voiced inability to tolerate BiPAP machine. She claims that her home machine is terrible, but she still was not able to tolerate our BiPAP machine either. Advised to bring the home BiPAP here so we can inspect to make sure it is working properly. She stated, \"no one can bring the machine. \"    CM has been consulted to help with transportation for her appointments    Acute on chronic respiratory failure with hypoxia and hypercapnea POA (O2 sats in 80s upon ambulation on 5L O2. 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 Pt has not been using it at home  COPD without exacerbation  NON compliance with medical therapy  - CXR with pulmonary edema    7.32/65.4/64/34.3  - very sedantary life style, not compliant with medical therapy nor with life style modification  - continue with breo and duoneb  - continue with Lasix 40mg IV BID     Daily weight; 181.4kg (400lbs) -> 192.kg (423lbs) ->187.9kg. Based on the hospital information, pt actually gained 14 lbs since the admission which is very hard to believe. Strict I & O, fluid restriction (1500ml/day)  - her respiratory status at base line.     Tentative discharge 24 hours     Coronary atherosclerosis of native coronary artery   HTN  SVT / A-fib with RVR  - appreciate cardiology input; cardiology recommends DOAC if arrhythmia recur and start on Dilt drip    Pt converted to SR/ST after one dose of IV BB on 3/15. Maintained SR since yesterday episode. Continue with PO BB, ASA  - pt denies any chest pain    DM2  - hgbA1C 8.4 (10/2018)    Continue Lantus and SSI  - pt looked away when attempt to discuss about importance dietary restriction. Intraductal Carcinoma of the right breast  - was diagnosed on 8/2018. She was seen by Dr. Ivy Bahena, general surgery to schedule for right mastectomy, and was suppose to follow up with Dr. Jovanna Ibrahim, heme/onc for further therapy if need. CA 27.29 was within normal range  - pt lost a follow up due to transportation issue. Chronic Low back pain  - pt requested to have oxycodone, but she has not been on any opiates. She may have Lidoderm patch. Venous stasis   - Bilateral leg care:  Cleanse the skin with soap and rinse well with water and dry. Immediately apply the Lac-Hydrin lotion and allow it to absorb into the skin. Intertrigo  - apply nystatin powder after the skin care      Supermorbid obesity  - shows no interest to talk about life style modification. \"I have heard enough! \"    Offered to discuss or provide any teaching material or recommendation when she is ready       Code Status: Full   Surrogate Decision Maker: Son and sister in law     DVT Prophylaxis: Lovenox  Subjective:     Chief Complaint / Reason for Physician Visit  \"my back hurts. Oxy works pretty good\". Discussed with RN events overnight.      Review of Systems:  Symptom Y/N Comments  Symptom Y/N Comments   Fever/Chills n   Chest Pain n    Poor Appetite    Edema     Cough n   Abdominal Pain     Sputum    Joint Pain     SOB/RAZO n   Pruritis/Rash     Nausea/vomit n   Tolerating PT/OT     Diarrhea    Tolerating Diet     Constipation    Other       Could NOT obtain due to: Objective:     VITALS:   Last 24hrs VS reviewed since prior progress note. Most recent are:  Patient Vitals for the past 24 hrs:   Temp Pulse Resp BP SpO2   03/16/19 1047 97.4 °F (36.3 °C) 83 20 121/80 91 %   03/16/19 0810 97.4 °F (36.3 °C) 89 20 125/76 93 %   03/16/19 0748     95 %   03/16/19 0500 97.5 °F (36.4 °C) 98 19 121/83 94 %   03/16/19 0220     94 %   03/16/19 0145     94 %   03/15/19 2354 97.5 °F (36.4 °C) 98 18 118/70 93 %   03/15/19 2051     92 %   03/15/19 1915 97.4 °F (36.3 °C) 91 18 134/74 94 %   03/15/19 1550 98 °F (36.7 °C) (!) 105 18 118/71 94 %   03/15/19 1250     95 %   03/15/19 1151 98.2 °F (36.8 °C) (!) 103 18 125/66 96 %       Intake/Output Summary (Last 24 hours) at 3/16/2019 1144  Last data filed at 3/16/2019 1053  Gross per 24 hour   Intake 480 ml   Output 4350 ml   Net -3870 ml        PHYSICAL EXAM:  General: Morbidly obese. Alert, cooperative, no acute distress    EENT:  EOMI. Anicteric sclerae. MMM  Resp:  Clear in apex with decrease breath sounds at bases, no wheezing or rales. No accessory muscle use  CV:  Regular  rhythm,  No edema  GI:  Soft, Non distended, Non tender.  +Bowel sounds  Neurologic:  Alert and oriented X 3, normal speech,   Psych:   Good insight. Not anxious nor agitated  Skin:  Lower extremities: both posterior LE hyperkeratosis/skin scaling. Erythematous in all skin folds area.  No jaundice    Reviewed most current lab test results and cultures  YES  Reviewed most current radiology test results   YES  Review and summation of old records today    NO  Reviewed patient's current orders and MAR    YES  PMH/SH reviewed - no change compared to H&P  ________________________________________________________________________  Care Plan discussed with:    Comments   Patient y    Family      RN y    Care Manager y    Consultant                       y Multidiciplinary team rounds were held today with , nursing, pharmacist and clinical coordinator. Patient's plan of care was discussed; medications were reviewed and discharge planning was addressed. ________________________________________________________________________  Total NON critical care TIME:  30   Minutes    Total CRITICAL CARE TIME Spent:   Minutes non procedure based      Comments   >50% of visit spent in counseling and coordination of care     ________________________________________________________________________  Candance Kind, NP     Procedures: see electronic medical records for all procedures/Xrays and details which were not copied into this note but were reviewed prior to creation of Plan. LABS:  I reviewed today's most current labs and imaging studies.   Pertinent labs include:  Recent Labs     03/16/19  0531 03/14/19  2230   WBC 9.4 8.7   HGB 12.0 11.8   HCT 40.2 40.3    155     Recent Labs     03/16/19  0531 03/14/19  2230    136   K 3.5 4.0   CL 99 99   CO2 36* 33*   * 294*   BUN 13 15   CREA 0.81 0.79   CA 10.1 10.0   MG 2.1  --    PHOS 2.5*  --    ALB 2.9* 2.9*   TBILI 0.4 0.3   SGOT 9* 11*   ALT 14 16       Signed: )Astrid Almazan, NP

## 2019-03-17 VITALS
RESPIRATION RATE: 22 BRPM | HEIGHT: 66 IN | TEMPERATURE: 97.8 F | OXYGEN SATURATION: 91 % | BODY MASS INDEX: 47.09 KG/M2 | DIASTOLIC BLOOD PRESSURE: 81 MMHG | HEART RATE: 82 BPM | WEIGHT: 293 LBS | SYSTOLIC BLOOD PRESSURE: 144 MMHG

## 2019-03-17 LAB
GLUCOSE BLD STRIP.AUTO-MCNC: 146 MG/DL (ref 65–100)
GLUCOSE BLD STRIP.AUTO-MCNC: 213 MG/DL (ref 65–100)
SERVICE CMNT-IMP: ABNORMAL
SERVICE CMNT-IMP: ABNORMAL

## 2019-03-17 PROCEDURE — 94660 CPAP INITIATION&MGMT: CPT

## 2019-03-17 PROCEDURE — 94760 N-INVAS EAR/PLS OXIMETRY 1: CPT

## 2019-03-17 PROCEDURE — 74011636637 HC RX REV CODE- 636/637: Performed by: INTERNAL MEDICINE

## 2019-03-17 PROCEDURE — 82962 GLUCOSE BLOOD TEST: CPT

## 2019-03-17 PROCEDURE — 74011250637 HC RX REV CODE- 250/637: Performed by: INTERNAL MEDICINE

## 2019-03-17 PROCEDURE — 77010033678 HC OXYGEN DAILY

## 2019-03-17 PROCEDURE — 94640 AIRWAY INHALATION TREATMENT: CPT

## 2019-03-17 PROCEDURE — 74011000250 HC RX REV CODE- 250: Performed by: INTERNAL MEDICINE

## 2019-03-17 PROCEDURE — 74011000250 HC RX REV CODE- 250: Performed by: NURSE PRACTITIONER

## 2019-03-17 PROCEDURE — 74011250636 HC RX REV CODE- 250/636: Performed by: INTERNAL MEDICINE

## 2019-03-17 RX ORDER — CYCLOBENZAPRINE HCL 5 MG
5 TABLET ORAL
Qty: 5 TAB | Refills: 0 | Status: SHIPPED | OUTPATIENT
Start: 2019-03-17 | End: 2020-08-17 | Stop reason: DRUGHIGH

## 2019-03-17 RX ORDER — IPRATROPIUM BROMIDE AND ALBUTEROL SULFATE 2.5; .5 MG/3ML; MG/3ML
3 SOLUTION RESPIRATORY (INHALATION)
Status: DISCONTINUED | OUTPATIENT
Start: 2019-03-17 | End: 2019-03-17 | Stop reason: HOSPADM

## 2019-03-17 RX ADMIN — CYCLOBENZAPRINE HYDROCHLORIDE 5 MG: 10 TABLET, FILM COATED ORAL at 09:09

## 2019-03-17 RX ADMIN — LORATADINE 10 MG: 10 TABLET ORAL at 09:10

## 2019-03-17 RX ADMIN — METOPROLOL TARTRATE 25 MG: 25 TABLET ORAL at 09:10

## 2019-03-17 RX ADMIN — FUROSEMIDE 40 MG: 10 INJECTION, SOLUTION INTRAMUSCULAR; INTRAVENOUS at 09:11

## 2019-03-17 RX ADMIN — Medication: at 09:14

## 2019-03-17 RX ADMIN — IPRATROPIUM BROMIDE AND ALBUTEROL SULFATE 3 ML: .5; 3 SOLUTION RESPIRATORY (INHALATION) at 01:59

## 2019-03-17 RX ADMIN — INSULIN LISPRO 2 UNITS: 100 INJECTION, SOLUTION INTRAVENOUS; SUBCUTANEOUS at 09:10

## 2019-03-17 RX ADMIN — MICONAZOLE NITRATE: 20 CREAM TOPICAL at 09:14

## 2019-03-17 RX ADMIN — METAXALONE 400 MG: 800 TABLET ORAL at 09:09

## 2019-03-17 RX ADMIN — ASPIRIN 81 MG 81 MG: 81 TABLET ORAL at 09:10

## 2019-03-17 RX ADMIN — INSULIN GLARGINE 40 UNITS: 100 INJECTION, SOLUTION SUBCUTANEOUS at 09:10

## 2019-03-17 RX ADMIN — NYSTATIN: 100000 CREAM TOPICAL at 09:14

## 2019-03-17 RX ADMIN — FLUTICASONE FUROATE AND VILANTEROL TRIFENATATE 1 PUFF: 200; 25 POWDER RESPIRATORY (INHALATION) at 09:12

## 2019-03-17 RX ADMIN — IPRATROPIUM BROMIDE AND ALBUTEROL SULFATE 3 ML: .5; 3 SOLUTION RESPIRATORY (INHALATION) at 07:53

## 2019-03-17 RX ADMIN — FLUTICASONE PROPIONATE 2 SPRAY: 50 SPRAY, METERED NASAL at 09:11

## 2019-03-17 RX ADMIN — ENOXAPARIN SODIUM 40 MG: 40 INJECTION SUBCUTANEOUS at 09:10

## 2019-03-17 RX ADMIN — IPRATROPIUM BROMIDE AND ALBUTEROL SULFATE 3 ML: .5; 3 SOLUTION RESPIRATORY (INHALATION) at 05:01

## 2019-03-17 NOTE — DISCHARGE SUMMARY
Hospitalist Discharge Summary     Patient ID:  Tal Kaur  [id number]  57 y.o.  1970    PCP on record: None    Admit date: 3/14/2019  Discharge date and time: 3/17/2019      DISCHARGE DIAGNOSIS:  Acute on chronic respiratory failure with hypoxia and hypercapnea POA (O2 sats in 80s upon ambulation on 5L O2. 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  female who presents with shortness of breath. As per patient, she is been having progressive worsening shortness of breath for past several month. She reports, she has hard time walking as she feel short winded upon ambulation as he O2 sat drops on 5L which she wears at baseline. Pt also reported difficulty breathing on laying flat on the bed. Pt reported chronic leg swelling. Pt denies any fever, chills, nausea, vomiting, diarrhea, chest pain, cough. In ED pt noted to have pulmonary edema on CXR and pt saturation in 80s upon ambulation per ED report. She also noted mild respiratory acidosis on ABGs.          ______________________________________________________________________  DISCHARGE SUMMARY/HOSPITAL COURSE:  for full details see H&P, daily progress notes, labs, consult notes.      Frequent admission with acute on chronic respiratory failure (1/10 - 1/14, 1/7 - 1/8) due noncompliance with medical therapy. Reports of difficulty with using BiPAP at home, initially claimed that her machine may not worked properly, but she only used 2-3 hours BiPAP during hospitalization also. Asked pt to bring her BiPAP machine so we can assure that machine is working properly, but pt told no one can bring the machine to the hospital. Asked CM to set up appointment with her pulmonologist so they can check the functioning of BiPAP machine. Pt lost follow up with general surgeon for her breast cancer. Pt was seen by Dr. Merlinda Crocker on August to discuss possible mastectomy, but the pt did not follow through due to transportation issue. But,she has not set up another appointment either. Asked our CM to assist with arranging appointment and arranging transportation issue. High risk of readmission. No new medical therapy during this admission other than re-enforcing her current care. Discussed importance of following the medication therapy. Acute on chronic respiratory failure with hypoxia and hypercapnea POA (O2 sats in 80s upon ambulation on 5L O2. 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 Pt has not been using it at home  COPD without exacerbation  NON compliance with medical therapy  - CXR with pulmonary edema  - very sedantary life style, not compliant with medical therapy nor with life style modification  - continue with breo and duoneb  - continue with Lasix   - her respiratory status at base line.     Coronary atherosclerosis of native coronary artery   HTN  SVT / A-fib with RVR  - One episode of SVT/A-fib with rate of 160's; no chest pain during the event, troponin (-). The episode resolved after receiving one IV dose of BB.     cardiology recommends DOAC if arrhythmia recurred. No DOAC at this time.  NO AC therapy at this time.    Continue with PO BB, ASA    DM2  - hgbA1C 8.4 (10/2018)    Continue with home diabetic regimen  - pt looked away when attempt to discuss about importance dietary restriction.     Intraductal Carcinoma of the right breast  - was diagnosed on 8/2018. She was seen by Dr. Devi Swartz, general surgery to schedule for right mastectomy, and was suppose to follow up with Dr. Pam Valverde, heme/onc for further therapy if need. CA 27.29 was within normal range  - pt lost a follow up due to transportation issue. Asked our CM to help with arranging the appointment.     Chronic Low back pain  - pt requested to have oxycodone. May take flexeril as need. Pt must follow up with her pcp in order to continue with flexeril therapy     Venous stasis   - Bilateral leg care:  Clean the skin with soap and rinse well with water and dry. Immediately apply the Lac-Hydrin lotion and allow it to absorb into the skin.     Intertrigo  - apply nystatin powder after the skin care        Supermorbid obesity  - shows no interest to talk about life style modification. \"I have heard enough! \"    Offered to discuss or provide any teaching material or recommendation when she is ready                  _______________________________________________________________________  Patient seen and examined by me on discharge day. Pertinent Findings:  Gen:    Not in distress  Chest: Clear lungs  CVS:   Regular rhythm. No edema. Chronic venous stasis in lower extremities  Abd:  Soft, not distended, not tender  Neuro:  Alert, orient x 4  _______________________________________________________________________  DISCHARGE MEDICATIONS:   Current Discharge Medication List      CONTINUE these medications which have CHANGED    Details   cyclobenzaprine (FLEXERIL) 5 mg tablet Take 1 Tab by mouth two (2) times daily as needed for Muscle Spasm(s).  flexeriol  Qty: 5 Tab, Refills: 0         CONTINUE these medications which have NOT CHANGED    Details albuterol-ipratropium (DUO-NEB) 2.5 mg-0.5 mg/3 ml nebu 3 mL by Nebulization route four (4) times daily. Qty: 100 Nebule, Refills: 1      furosemide (LASIX) 40 mg tablet Take 40 mg by mouth two (2) times a day. fluticasone (FLONASE) 50 mcg/actuation nasal spray 2 Sprays by Both Nostrils route daily. Qty: 1 Bottle, Refills: 0      insulin glargine (LANTUS) 100 unit/mL injection 40 Units by SubCUTAneous route daily. metoprolol tartrate (LOPRESSOR) 25 mg tablet Take 25 mg by mouth two (2) times a day. fluticasone-salmeterol (ADVAIR DISKUS) 500-50 mcg/dose diskus inhaler Take 1 Puff by inhalation every twelve (12) hours. loratadine (CLARITIN) 10 mg tablet Take 10 mg by mouth daily. albuterol (VENTOLIN HFA) 90 mcg/actuation inhaler Take 2 Puffs by inhalation every six (6) hours as needed for Wheezing. aspirin 81 mg chewable tablet Take 81 mg by mouth daily. hydrOXYzine pamoate (VISTARIL) 50 mg capsule Take 50 mg by mouth every eight (8) hours as needed for Itching. lovastatin (MEVACOR) 40 mg tablet Take 1 Tab by mouth nightly. Qty: 30 Tab, Refills: 6    Associated Diagnoses: Atherosclerosis of native coronary artery without angina pectoris      glyBURIDE (DIABETA) 5 mg tablet Take 5 mg by mouth two (2) times daily (with meals). ammonium lactate (LAC-HYDRIN) 12 % topical cream rub in to affected area well  Qty: 280 g, Refills: 1      nystatin (MYCOSTATIN) topical cream Apply  to affected area three (3) times daily as needed for Skin Irritation. nystatin (MYCOSTATIN) powder Apply  to affected area three (3) times daily as needed (skin irratation). nitroglycerin (NITROSTAT) 0.4 mg SL tablet 1 Tab by SubLINGual route every five (5) minutes as needed for Chest Pain (call 911 if not relieved by 3).   Qty: 25 Tab, Refills: 2    Associated Diagnoses: SVT (supraventricular tachycardia) ([facility]); CHF (congestive heart failure) ([facility]         STOP taking these medications naproxen sodium (ALEVE) 220 mg tablet Comments:   Reason for Stopping:               My Recommended Diet, Activity, Wound Care, and follow-up labs are listed in the patient's Discharge Insturctions which I have personally completed and reviewed. _______________________________________________________________________  DISPOSITION:    Home with Family:    Home with HH/PT/OT/RN: y   SNF/LTC:    LUIS A:    OTHER:        Condition at Discharge:  Stable  _______________________________________________________________________  Follow up with:   PCP : None  Follow-up Information     Follow up With Specialties Details Why Contact Info    [facility], In-Home Clinical Assessments   [facility] [address] Galdino Nielsen MD General Surgery, Breast Surgery, Endocrinology, Colon and Rectal Surgery Schedule an appointment as soon as possible for a visit  [address] [place] [address] [address] [place] [address] Pkwy  [facility] [id number]  [phone]              Total time in minutes spent coordinating this discharge (includes going over instructions, follow-up, prescriptions, and preparing report for sign off to her PCP) :  31 minutes    Signed:  Hyunsun Selmer Hashimoto, NP

## 2019-03-17 NOTE — DISCHARGE INSTRUCTIONS
Patient Discharge Instructions     Pt Name  John Adams   Date of Birth 1970   Age  57 y.o. Medical Record Number  [id number]   PCP None    Admit date:  3/14/2019 @    [place] [address]    Room Number  2223/01   Date of Discharge 3/17/2019     Admission Diagnoses:     Acute respiratory failure with hypoxia and hypercarbia (HCC)        No Known Allergies     You were admitted to [address] for  Acute respiratory failure with hypoxia and hypercarbia (HCC)    YOUR OTHER MEDICAL DIAGNOSES INCLUDE (BUT NOT LIMITED TO ):  Present on Admission:   Acute on chronic respiratory failure with hypoxia (HCC)   Obesity hypoventilation syndrome (HCC)   Acute on chronic diastolic heart failure (HCC)   Coronary atherosclerosis of native coronary artery   COPD (chronic obstructive pulmonary disease) (HCC)   Obesity, morbid (more than 100 lbs over ideal weight or BMI > 40) (HCC)   HTN (hypertension)   Lymphedema of both lower extremities   Acute respiratory failure with hypoxia and hypercarbia (HCC)   Noncompliance with therapeutic plan   Malignant essential hypertension   Breast cancer (HCC)      DIET:  Diabetic Diet       Recommended activity: Activity as tolerated  Follow up : Follow-up Information     Follow up With Specialties Details Why Contact Info    [facility], In-Home Clinical Assessments   [facility] [facility]  [phone]        - Bilateral leg care:  Clean the skin with soap and rinse well with water and dry. Immediately apply the Lac-Hydrin lotion and allow it to absorb into the skin. · It is important that you take the medication exactly as they are prescribed. · Keep your medication in the bottles provided by the pharmacist and keep a list of the medication names, dosages, and times to be taken in your wallet. · Do not take other medications without consulting your doctor. ADDITIONAL INFORMATION: If you experience any of the following symptoms or have any health problem not listed below, then please call your primary care physician or return to the emergency room if you cannot get hold of your doctor: Fever, chills, nausea, vomiting, diarrhea, change in mentation, falling, bleeding, shortness of breath. I understand that if any problems occur once I am discharged, I am supposed to call my Primary care physician for further care or seek help in the Emergency Department at the nearest Healthcare facility. I have had an opportunity to discuss my clinical issues with my doctor and nursing staff. I understand and acknowledge receipt of the above instructions.                                                                                                                                            Physician's or R.N.'s Signature                                                            Date/Time                                                                                                                                              Patient or Representative Signature                                                 Date/Time

## 2019-03-17 NOTE — PROGRESS NOTES
Report received from Linda Garza [place]. Introduced myself as primary RN. Assumed care of patient. Instructed patient to call for assistance prior to getting up. Pt verbalized understanding. Call bell within reach. 8:30 AM  Multiple calls made to CM. Per NP she has spoken with CM re: discharge planning. VM left for CM on call, awaiting telephone call back. 11:45 AM Multiple calls made to CM. VM left for CM on call, awaiting telephone call back. 12:00 PM CM on fl. Informed [facility] transport has been scheduled for 1:30 pm. Instructed to print: face sheet, d/c summary, HPI and [facility] form. Discharge instructions reviewed with the patient. Patient verbalized understanding. Opportunities for questions and concerns provided. Peripheral IV and telemetry box removed. Pt stable for discharge. [facility] transportation at bedside.

## 2019-03-17 NOTE — PROGRESS NOTES
Reason for Admission:   Acute on chronic respiratory failure with hypoxia, pulmonary edema with history of CHF and COPD               RRAT Score:     30             Resources/supports as identified by patient/family:   Primary support is personal care  For 6 hours a day                Top Challenges facing patient (as identified by patient/family and CM): Finances/Medication cost?    Patient's primary complaint was getting to/from physician as she has large portable tanks and aid struggles to help get patient pushed in w/c with portable oxygen tank. Patient lost PCP in February when insurance changed to [facility]. Transportation? Has difficult using a w/c [employer] system or lack thereof? Primary support is personal care aid for 6 hours a day. Does have child locally                     Living arrangements? Lives alone             Self-care/ADLs/Cognition? Alert and oriented and ambulates with assist of rollator          Current Advanced Directive/Advance Care Plan: On file                          Plan for utilizing home health:    None                      Likelihood of readmission: High based on RRAT                 Transition of Care Plan:     Patient cleared for discharge home. Patient stated she lives alone and has personal care aid 6 hours a day. Patient stated she received 3 small meals a day through meals on wheels. Patient expressed concern that she lost her PCP which was [facility] in February for insurance changed to [facility]. Patient has no PCP. Patient stated she has difficulty leaving her home in w/c and having large portable tanks. Patient requested assistance with obtaining a home based care program. Reviewed Palliative Care note that patient may not qualify for a home based care plan but has placed in-basket request to [address]  to screen. Patient open to Hospital to Home visit and referral was placed to [facility]. Have requested [facility] transport for 1:30 PM today.      Patient has her personal care aid  her medications from [facility] on [address] and [address] Management Interventions  PCP Verified by CM: No  Palliative Care Criteria Met (RRAT>21 & CHF Dx)?: Yes  Palliative Consult Recommended?: Yes  Mode of Transport at Discharge: [address]  [address] Time of Discharge: 1330  Discharge Durable Medical Equipment: No(has home oxygen, rollator and wheelchair)  Physical Therapy Consult: Yes  Occupational Therapy Consult: Yes  Speech Therapy Consult: No  Current Support Network: Lives Alone  Plan discussed with Pt/Family/Caregiver: Yes  Freedom of Choice Offered: Yes  Discharge Location  Discharge Placement: Home               [phone]

## 2019-03-18 ENCOUNTER — NURSE NAVIGATOR (OUTPATIENT)
Dept: FAMILY MEDICINE CLINIC | Age: 49
End: 2019-03-18

## 2019-03-18 ENCOUNTER — TELEPHONE (OUTPATIENT)
Dept: PALLATIVE CARE | Age: 49
End: 2019-03-18

## 2019-03-18 NOTE — TELEPHONE ENCOUNTER
Returned call to Devin Boxer and advised that Tasneem Greene will call her back in reference to referral on this patient

## 2019-03-19 ENCOUNTER — NURSE NAVIGATOR (OUTPATIENT)
Dept: FAMILY MEDICINE CLINIC | Age: 49
End: 2019-03-19

## 2019-03-19 NOTE — PROGRESS NOTES
Home Based Primary Care & Supportive Services   (previously: At Home)  [phone] 4101 [facility] 6, [address]    Name:  Avi Sawyer  YOB: 1970    Incoming call from Giulia Messer NP with [facility]. Ms. Damon Tang is in pt's home now making a LETICIA visit and she is calling to refer pt to [address] [place] [address]. This nurse explained that Ms. Rylie Correia had been referred to [address] [place] [address] yesterday by Ely Elliott, the Care Manager that was covering at [address] over the weekend (pt was discharged from [address] on 3/17/19). [facility] PSR Connie Ruiz checked pt's insurance yesterday and pt's [facility] does not cover [facility]. Nurse informed Ms. Damon Tang that [employer] does not cover [facility]. Ms. Damon Tang reports that [facility] accepts pt's insurance. Pt is already on a waiting list for [facility]. Ms. Damon Tang requests that this nurse follow up to see if pt can be seen soon by [facility]. This nurse called Ely Elliott CM. She states that she was only covering at [address] over the weekend and that Chelo Velasquez CM at [address] will be making a patient outreach call to Ms. Rylie Correia. This nurse called Chelo Velasquez and informed her that pt is on the wait list at [facility], [phone].   Ms. Hairston Doctor says she will call [facility] to see if pt can be moved up on the waiting list.    Rhoda Khan, NORMA  Referral Navigator, Home Based [address] [facility]

## 2019-03-19 NOTE — PROGRESS NOTES
Home Based Primary Care & Supportive Services   (previously: At Home)  [phone] 4101 [facility] [address]    Name:  Meron Oropeza  YOB: 1970    Received email from Ruthie Yee, Care Manager who was covering over the weekend inquiring about a [facility] referral for Yahaira Arroyo. Pt was discharged from [facility] on 3/17/19. [facility] PSR Delta Calvo checked [employer]. Pt's [facility] plan does not cover [facility]. This nurse responded to Ruthie Yee and informed her of this. [facility] also is not in-network with [facility].       Tasneem Greene, RN  Referral Navigator, Home Based Primary Care & Supportive Services

## 2019-03-24 ENCOUNTER — APPOINTMENT (OUTPATIENT)
Dept: GENERAL RADIOLOGY | Age: 49
End: 2019-03-24
Attending: EMERGENCY MEDICINE
Payer: MEDICARE

## 2019-03-24 ENCOUNTER — HOSPITAL ENCOUNTER (EMERGENCY)
Age: 49
Discharge: HOME | End: 2019-03-25
Attending: EMERGENCY MEDICINE
Payer: MEDICARE

## 2019-03-24 DIAGNOSIS — J42 CHRONIC BRONCHITIS, UNSPECIFIED CHRONIC BRONCHITIS TYPE (HCC): Primary | ICD-10-CM

## 2019-03-24 LAB
ALBUMIN SERPL-MCNC: 3.1 G/DL (ref 3.5–5)
ALBUMIN/GLOB SERPL: 0.6 {RATIO} (ref 1.1–2.2)
ALP SERPL-CCNC: 94 U/L (ref 45–117)
ALT SERPL-CCNC: 19 U/L (ref 12–78)
ANION GAP SERPL CALC-SCNC: 5 MMOL/L (ref 5–15)
ARTERIAL PATENCY WRIST A: YES
AST SERPL-CCNC: 14 U/L (ref 15–37)
BASE EXCESS BLD CALC-SCNC: 8 MMOL/L
BASOPHILS # BLD: 0 K/UL (ref 0–0.1)
BASOPHILS NFR BLD: 0 % (ref 0–1)
BDY SITE: ABNORMAL
BILIRUB SERPL-MCNC: 0.3 MG/DL (ref 0.2–1)
BNP SERPL-MCNC: 218 PG/ML
BUN SERPL-MCNC: 16 MG/DL (ref 6–20)
BUN/CREAT SERPL: 19 (ref 12–20)
CALCIUM SERPL-MCNC: 10.1 MG/DL (ref 8.5–10.1)
CHLORIDE SERPL-SCNC: 98 MMOL/L (ref 97–108)
CO2 SERPL-SCNC: 31 MMOL/L (ref 21–32)
CREAT SERPL-MCNC: 0.85 MG/DL (ref 0.55–1.02)
DIFFERENTIAL METHOD BLD: ABNORMAL
EOSINOPHIL # BLD: 0.2 K/UL (ref 0–0.4)
EOSINOPHIL NFR BLD: 2 % (ref 0–7)
ERYTHROCYTE [DISTWIDTH] IN BLOOD BY AUTOMATED COUNT: 13.2 % (ref 11.5–14.5)
GAS FLOW.O2 O2 DELIVERY SYS: ABNORMAL L/MIN
GAS FLOW.O2 SETTING OXYMISER: 6 L/M
GLOBULIN SER CALC-MCNC: 5.3 G/DL (ref 2–4)
GLUCOSE SERPL-MCNC: 168 MG/DL (ref 65–100)
HCO3 BLD-SCNC: 34.1 MMOL/L (ref 22–26)
HCT VFR BLD AUTO: 42.2 % (ref 35–47)
HGB BLD-MCNC: 12.8 G/DL (ref 11.5–16)
IMM GRANULOCYTES # BLD AUTO: 0 K/UL (ref 0–0.04)
IMM GRANULOCYTES NFR BLD AUTO: 0 % (ref 0–0.5)
LYMPHOCYTES # BLD: 1.1 K/UL (ref 0.8–3.5)
LYMPHOCYTES NFR BLD: 12 % (ref 12–49)
MCH RBC QN AUTO: 28.3 PG (ref 26–34)
MCHC RBC AUTO-ENTMCNC: 30.3 G/DL (ref 30–36.5)
MCV RBC AUTO: 93.2 FL (ref 80–99)
MONOCYTES # BLD: 0.7 K/UL (ref 0–1)
MONOCYTES NFR BLD: 8 % (ref 5–13)
NEUTS SEG # BLD: 7.4 K/UL (ref 1.8–8)
NEUTS SEG NFR BLD: 78 % (ref 32–75)
NRBC # BLD: 0 K/UL (ref 0–0.01)
NRBC BLD-RTO: 0 PER 100 WBC
PCO2 BLD: 61.4 MMHG (ref 35–45)
PH BLD: 7.35 [PH] (ref 7.35–7.45)
PLATELET # BLD AUTO: 181 K/UL (ref 150–400)
PMV BLD AUTO: 10.9 FL (ref 8.9–12.9)
PO2 BLD: 81 MMHG (ref 80–100)
POTASSIUM SERPL-SCNC: 3.9 MMOL/L (ref 3.5–5.1)
PROT SERPL-MCNC: 8.4 G/DL (ref 6.4–8.2)
RBC # BLD AUTO: 4.53 M/UL (ref 3.8–5.2)
SAO2 % BLD: 95 % (ref 92–97)
SODIUM SERPL-SCNC: 134 MMOL/L (ref 136–145)
SPECIMEN TYPE: ABNORMAL
TOTAL RESP. RATE, ITRR: 22
TROPONIN I SERPL-MCNC: <0.05 NG/ML
WBC # BLD AUTO: 9.5 K/UL (ref 3.6–11)

## 2019-03-24 PROCEDURE — 74011000250 HC RX REV CODE- 250: Performed by: EMERGENCY MEDICINE

## 2019-03-24 PROCEDURE — 36600 WITHDRAWAL OF ARTERIAL BLOOD: CPT

## 2019-03-24 PROCEDURE — 99284 EMERGENCY DEPT VISIT MOD MDM: CPT

## 2019-03-24 PROCEDURE — 94640 AIRWAY INHALATION TREATMENT: CPT

## 2019-03-24 PROCEDURE — 77030029684 HC NEB SM VOL KT MONA -A

## 2019-03-24 PROCEDURE — 82803 BLOOD GASES ANY COMBINATION: CPT

## 2019-03-24 PROCEDURE — 71045 X-RAY EXAM CHEST 1 VIEW: CPT

## 2019-03-24 PROCEDURE — 84484 ASSAY OF TROPONIN QUANT: CPT

## 2019-03-24 PROCEDURE — 93005 ELECTROCARDIOGRAM TRACING: CPT

## 2019-03-24 PROCEDURE — 36415 COLL VENOUS BLD VENIPUNCTURE: CPT

## 2019-03-24 PROCEDURE — 80053 COMPREHEN METABOLIC PANEL: CPT

## 2019-03-24 PROCEDURE — 83880 ASSAY OF NATRIURETIC PEPTIDE: CPT

## 2019-03-24 PROCEDURE — 85025 COMPLETE CBC W/AUTO DIFF WBC: CPT

## 2019-03-24 RX ORDER — IPRATROPIUM BROMIDE AND ALBUTEROL SULFATE 2.5; .5 MG/3ML; MG/3ML
3 SOLUTION RESPIRATORY (INHALATION)
Status: COMPLETED | OUTPATIENT
Start: 2019-03-24 | End: 2019-03-24

## 2019-03-24 RX ADMIN — IPRATROPIUM BROMIDE AND ALBUTEROL SULFATE 3 ML: .5; 3 SOLUTION RESPIRATORY (INHALATION) at 22:53

## 2019-03-25 VITALS
DIASTOLIC BLOOD PRESSURE: 77 MMHG | HEART RATE: 85 BPM | TEMPERATURE: 98 F | OXYGEN SATURATION: 97 % | RESPIRATION RATE: 20 BRPM | SYSTOLIC BLOOD PRESSURE: 123 MMHG | HEIGHT: 66 IN | BODY MASS INDEX: 47.09 KG/M2 | WEIGHT: 293 LBS

## 2019-03-25 LAB
ATRIAL RATE: 88 BPM
CALCULATED P AXIS, ECG09: 35 DEGREES
CALCULATED R AXIS, ECG10: 66 DEGREES
CALCULATED T AXIS, ECG11: 38 DEGREES
DIAGNOSIS, 93000: NORMAL
P-R INTERVAL, ECG05: 184 MS
Q-T INTERVAL, ECG07: 372 MS
QRS DURATION, ECG06: 88 MS
QTC CALCULATION (BEZET), ECG08: 450 MS
VENTRICULAR RATE, ECG03: 88 BPM

## 2019-03-25 RX ORDER — OXYCODONE AND ACETAMINOPHEN 5; 325 MG/1; MG/1
1 TABLET ORAL
Qty: 15 TAB | Refills: 0 | Status: SHIPPED | OUTPATIENT
Start: 2019-03-25 | End: 2019-03-28

## 2019-03-25 NOTE — DISCHARGE INSTRUCTIONS
Patient Education        Chronic Obstructive Pulmonary Disease (COPD): Care Instructions  Your Care Instructions    Chronic obstructive pulmonary disease (COPD) is a general term for a group of lung diseases, including emphysema and chronic bronchitis. People with COPD have decreased airflow in and out of the lungs, which makes it hard to breathe. The airways also can get clogged with thick mucus. Cigarette smoking is a major cause of COPD. Although there is no cure for COPD, you can slow its progress. Following your treatment plan and taking care of yourself can help you feel better and live longer. Follow-up care is a key part of your treatment and safety. Be sure to make and go to all appointments, and call your doctor if you are having problems. It's also a good idea to know your test results and keep a list of the medicines you take. How can you care for yourself at home?   Staying healthy    · Do not smoke. This is the most important step you can take to prevent more damage to your lungs. If you need help quitting, talk to your doctor about stop-smoking programs and medicines. These can increase your chances of quitting for good.     · Avoid colds and flu. Get a pneumococcal vaccine shot. If you have had one before, ask your doctor whether you need a second dose. Get the flu vaccine every fall. If you must be around people with colds or the flu, wash your hands often.     · Avoid secondhand smoke, air pollution, and high altitudes. Also avoid cold, dry air and hot, humid air. Stay at home with your windows closed when air pollution is bad.    Medicines and oxygen therapy    · Take your medicines exactly as prescribed. Call your doctor if you think you are having a problem with your medicine.     · You may be taking medicines such as:  ? Bronchodilators. These help open your airways and make breathing easier. Bronchodilators are either short-acting (work for 6 to 9 hours) or long-acting (work for 24 hours). You inhale most bronchodilators, so they start to act quickly. Always carry your quick-relief inhaler with you in case you need it while you are away from home. ? Corticosteroids (prednisone, budesonide). These reduce airway inflammation. They come in pill or inhaled form. You must take these medicines every day for them to work well.     · A spacer may help you get more inhaled medicine to your lungs. Ask your doctor or pharmacist if a spacer is right for you. If it is, ask how to use it properly.     · Do not take any vitamins, over-the-counter medicine, or herbal products without talking to your doctor first.     · If your doctor prescribed antibiotics, take them as directed. Do not stop taking them just because you feel better. You need to take the full course of antibiotics.     · Oxygen therapy boosts the amount of oxygen in your blood and helps you breathe easier. Use the flow rate your doctor has recommended, and do not change it without talking to your doctor first.   Activity    · Get regular exercise. Walking is an easy way to get exercise. Start out slowly, and walk a little more each day.     · Pay attention to your breathing. You are exercising too hard if you cannot talk while you are exercising.     · Take short rest breaks when doing household chores and other activities.     · Learn breathing methods--such as breathing through pursed lips--to help you become less short of breath.     · If your doctor has not set you up with a pulmonary rehabilitation program, talk to him or her about whether rehab is right for you. Rehab includes exercise programs, education about your disease and how to manage it, help with diet and other changes, and emotional support. Diet    · Eat regular, healthy meals. Use bronchodilators about 1 hour before you eat to make it easier to eat. Eat several small meals instead of three large ones.  Drink beverages at the end of the meal. Avoid foods that are hard to chew.     · Eat foods that contain protein so that you do not lose muscle mass.     · Talk with your doctor if you gain too much weight or if you lose weight without trying.    Mental health    · Talk to your family, friends, or a therapist about your feelings. It is normal to feel frightened, angry, hopeless, helpless, and even guilty. Talking openly about bad feelings can help you cope. If these feelings last, talk to your doctor. When should you call for help? Call 911 anytime you think you may need emergency care. For example, call if:    · You have severe trouble breathing.    Call your doctor now or seek immediate medical care if:    · You have new or worse trouble breathing.     · You cough up blood.     · You have a fever.    Watch closely for changes in your health, and be sure to contact your doctor if:    · You cough more deeply or more often, especially if you notice more mucus or a change in the color of your mucus.     · You have new or worse swelling in your legs or belly.     · You are not getting better as expected. Where can you learn more? Go to http://cassia-donte.info/. Gisela Gomez in the search box to learn more about \"Chronic Obstructive Pulmonary Disease (COPD): Care Instructions. \"  Current as of: September 5, 2018  Content Version: 11.9  © 4873-3180 [employer]. Care instructions adapted under license by [employer] (which disclaims liability or warranty for this information). If you have questions about a medical condition or this instruction, always ask your healthcare professional. [place] [address] any warranty or liability for your use of this information. Patient Education        Chronic Obstructive Pulmonary Disease (COPD) Flare-Ups: Care Instructions  Your Care Instructions    Chronic obstructive pulmonary disease (COPD) is a lung disease that makes it hard to breathe.  It is caused by damage to the lungs over many years, usually from smoking. COPD is often a mix of two diseases:  · Chronic bronchitis: The airways that carry air to the lungs (bronchial tubes) get inflamed and make a lot of mucus. This can narrow or block the airways. · Emphysema: In a healthy person, the tiny air sacs in the lungs are like balloons. As you breathe in and out, they get bigger and smaller to move air through your lungs. But with emphysema, these air sacs are damaged and lose their stretch. Less air gets in and out of the lungs. Many people with COPD have attacks called flare-ups or exacerbations. This is when your usual symptoms quickly get worse and stay worse. The doctor has checked you carefully. But problems can develop later. If you notice any problems or new symptoms, get medical treatment right away. Follow-up care is a key part of your treatment and safety. Be sure to make and go to all appointments, and call your doctor if you are having problems. It's also a good idea to know your test results and keep a list of the medicines you take. How can you care for yourself at home? · Be safe with medicines. Take your medicines exactly as prescribed. Call your doctor if you think you are having a problem with your medicine. You may be taking medicines such as:  ? Bronchodilators. These help open your airways and make breathing easier. ? Corticosteroids. These reduce airway inflammation. They may be given as pills, in a vein, or in an inhaled form. You may go home with pills in addition to an inhaler that you already use. · A spacer may help you get more inhaled medicine to your lungs. Ask your doctor or pharmacist if a spacer is right for you. If it is, ask how to use it properly. · If your doctor prescribed antibiotics, take them as directed. Do not stop taking them just because you feel better. You need to take the full course of antibiotics. · If your doctor prescribed oxygen, use the flow rate your doctor has recommended.  Do not change it without talking to your doctor first.  · Do not smoke. Smoking makes COPD worse. If you need help quitting, talk to your doctor about stop-smoking programs and medicines. These can increase your chances of quitting for good. When should you call for help? Call 911 anytime you think you may need emergency care. For example, call if:    · You have severe trouble breathing.    Call your doctor now or seek immediate medical care if:    · You have new or worse trouble breathing.     · Your coughing or wheezing gets worse.     · You cough up dark brown or bloody mucus (sputum).     · You have a new or higher fever.    Watch closely for changes in your health, and be sure to contact your doctor if:    · You notice more mucus or a change in the color of your mucus.     · You need to use your antibiotic or steroid pills.     · You do not get better as expected. Where can you learn more? Go to http://cassia-donte.info/. Enter G561 in the search box to learn more about \"Chronic Obstructive Pulmonary Disease (COPD) Flare-Ups: Care Instructions. \"  Current as of: September 5, 2018  Content Version: 11.9  © 1269-1533 [employer]. Care instructions adapted under license by [employer] (which disclaims liability or warranty for this information). If you have questions about a medical condition or this instruction, always ask your healthcare professional. [place] [address] any warranty or liability for your use of this information. [facility] Departments     For adult and child immunizations, family planning, TB screening, STD testing and women's health services.    [facility]: [address] [phone]    IESXDUQ LDCNWW   [address]: [place] [phone]      [place] [phone]      [address] Clinics     For primary care services, woman and child wellness, and some clinics providing specialty care. [facility] -- [address] [facility] [phone]   411 [facility] 200 [facility] 3614 [facility] [phone]   339 [facility] [address] [phone]   [address] [facility] 1604 [facility] 5850  Community  [phone]   7700 [facility] [address] [address] [phone]   [facility] 81 [facility] [phone]   [facility] [address] [place] [address] [phone]   [facility]: [facility] [address] [facility] [address] [phone]     Wilfredo [address] [facility] [address] [phone]   [facility] 5850 [facility]  [phone]   [employer] [facility]  [address], [place] [address] ([email or web address]) [phone]         Sexual Health/Woman Wellness Clinics    For STD/HIV testing and treatment, pregnancy testing and services, men's health, birth control services, LGBT services, and hepatitis/HPV vaccine services. [employer] for [employer] [address] [facility] [address] [facility] [address] [facility] 1579 [address] [phone]   [facility] [address], 5th floor [phone]   Pregnancy [address] [facility] [address]  Amaris Butler [phone]         [place] 9939 [facility] 94 [facility]   [phone]   [place]   [phone]   Women, Infant and Children's Services: Estuardo       600 [facility]   [address] Psychiatry     [phone]   [address] [place] [address] [facility] 1500 [facility] Physicians [phone]  MD Ashley Sapp, MD Asia Leahy MD [facility] [phone]  Wan Rose, P  MD Porsha Morales MD Ardyce Hunting, MD [address]  [phone]  MD Dong Cunningham MD [address] [facility] [phone]  Ryan Clarke, MD Lulu Summers, MD Janyce Starch, MD Earmon Ahumada, MD   [facility] [phone]  XDRO XFICUJ MONTEZ, MD Tina Nelson, MD Jes[facility], NP [address] [phone]  MD Harmeet Dugan, MD Albert Newton, MD Dyana Heimlich, MD Edgar Yuen, MD Alana Jin, MD Mario Reeves, MD   [address] [facility]  Anita Saucedo MD [facility] [phone]  Cayla Mahan, MD Louisa Horton, NP  Yoni Barnett, MD Saranya Bueno, MD Tina Campbell, MD Tulio Butler MD   [address] [facility] [phone]  MD Stephanie Redman, P  Darry Goodell, NP  Clarence Bullock, MD Ismael Hutchinson, MD Maury Herbert MD [facility] [phone]  MD Octaviano Sheldon MD Clem Dasen, MD Bayron Sorto MD   [address] [phone]  MD Katarina Krause MD [place] [phone]  MD Khadra Call MD Erasmo Stalling, MD   [facility] [phone]  Princess Pedroza, MD Patricio Toscano, MD Martha Godoy, MD Isabela Salvador, MD Carol Block, MD Pastor Diane, NP  Jessica Pierce MD [address]   [phone]  General Arts, MD Gabriele Russell MD   210 [facility] [phone]  [address] [place] [address] MD Jude Martines, P  Ken Carlin KIM Weinberg, KIM May MD Lesli Postal, NP   Leonard Brambila, DO Miscellaneous:  Leslie Mcleod MD [address] Hospital/ED Visit Follow-Up Instructions    You have an in home follow up visit set up with Chris Delgadillo. What are we? [facility] is an in-home urgent care service staffed with emergency trained medical teams. We come to your home in a vehicle stocked with medical supplies and technology. An ER physician is always available if needed. When? As a part of your hospital follow-up, an appointment has been set up for us to come see you. Usually, this will be 24-72 hours after you leave the hospital. [place] is open 9am-9pm, 7 days a week, 365 days a year, including holidays. Why? We know that you cannot always get to your doctor after being in the hospital and that your doctor is not always available when you need them. Once your workup is complete, we'll call in your prescriptions, update your family doctor, and handle billing with your insurance so you can focus on feeling better, faster without leaving home. How much? We accept most major health insurance plans, including Medicaid, Medicare, and Medicare Advantage [facility], [employer], and [employer]. We also accept: credit, debit, health savings account (HSA), health reimbursement account (HRA) and flexible spending account (FSA) payments. [employer] [facility]'s prices compare to conventional urgent care facilities, but we bring the care to you. How to reach us? Getting care is easy- use our mobile mireya (rVue), website ([email or web address]) or call us [phone]. Thank you for allowing us to be a part of your medical care!

## 2019-03-26 NOTE — ED PROVIDER NOTES
EMERGENCY DEPARTMENT HISTORY AND PHYSICAL EXAM 
 
 
Date: 3/24/2019 Patient Name: Barbara Hargrove History of Presenting Illness Chief Complaint Patient presents with  Cough  
  pt presents with c/o shortness of breath and chest tightness. pt has hx of COPD. pt uses 5L History Provided By: Patient HPI: Barbara Hargrove, 57 y.o. female with PMHx significant for hypertension, chronic severe COPD, obesity hypoventilatory syndrome, and myocardial infarction, presents to the ED with cc of mild to moderate shortness of breath with nonproductive cough over the last 2-3 days. Patient also describes a pleuritic right-sided chest pain that she has frequently had with her COPD. She denies any productive sputum or new fevers or chills. Patient was recently admitted for COPD exacerbation but has been unable to arrange for outpatient follow-up. She reports that she intermittently takes her medications. She reports she has had many challenges secondary to not having insurance. She has no other associated symptoms. There is no exacerbating or ameliorating factors other than medication noncompliance. There are no other complaints, changes, or physical findings at this time. PCP: None No current facility-administered medications on file prior to encounter. Current Outpatient Medications on File Prior to Encounter Medication Sig Dispense Refill  cyclobenzaprine (FLEXERIL) 5 mg tablet Take 1 Tab by mouth two (2) times daily as needed for Muscle Spasm(s). flexeriol 5 Tab 0  
 albuterol-ipratropium (DUO-NEB) 2.5 mg-0.5 mg/3 ml nebu 3 mL by Nebulization route four (4) times daily. 100 Nebule 1  
 furosemide (LASIX) 40 mg tablet Take 40 mg by mouth two (2) times a day.  nystatin (MYCOSTATIN) topical cream Apply  to affected area three (3) times daily as needed for Skin Irritation.     
 nystatin (MYCOSTATIN) powder Apply  to affected area three (3) times daily as needed (skin irratation).  fluticasone (FLONASE) 50 mcg/actuation nasal spray 2 Sprays by Both Nostrils route daily. 1 Bottle 0  
 insulin glargine (LANTUS) 100 unit/mL injection 40 Units by SubCUTAneous route daily.  metoprolol tartrate (LOPRESSOR) 25 mg tablet Take 25 mg by mouth two (2) times a day.  fluticasone-salmeterol (ADVAIR DISKUS) 500-50 mcg/dose diskus inhaler Take 1 Puff by inhalation every twelve (12) hours.  loratadine (CLARITIN) 10 mg tablet Take 10 mg by mouth daily.  albuterol (VENTOLIN HFA) 90 mcg/actuation inhaler Take 2 Puffs by inhalation every six (6) hours as needed for Wheezing.  aspirin 81 mg chewable tablet Take 81 mg by mouth daily.  hydrOXYzine pamoate (VISTARIL) 50 mg capsule Take 50 mg by mouth every eight (8) hours as needed for Itching.  lovastatin (MEVACOR) 40 mg tablet Take 1 Tab by mouth nightly. 30 Tab 6  
 glyBURIDE (DIABETA) 5 mg tablet Take 5 mg by mouth two (2) times daily (with meals).  ammonium lactate (LAC-HYDRIN) 12 % topical cream rub in to affected area well 280 g 1  
 nitroglycerin (NITROSTAT) 0.4 mg SL tablet 1 Tab by SubLINGual route every five (5) minutes as needed for Chest Pain (call 911 if not relieved by 3). 25 Tab 2 Past History Past Medical History: 
Past Medical History:  
Diagnosis Date  Arthritis  Asthma  Breast lump  CAD (coronary artery disease)  Congestive heart failure ([facility] [address]  COPD (chronic obstructive pulmonary disease) ([facility])  Diabetes ([facility] [address]  Hypertension  Morbid obesity with BMI of 70 and over, adult ([facility] [address]  NSTEMI (non-ST elevated myocardial infarction) ([facility] [address]  SVT (supraventricular tachycardia) ([facility]) Past Surgical History: 
Past Surgical History:  
Procedure Laterality Date  CARDIAC SURG PROCEDURE UNLIST Stents  HX  SECTION    
 HX ORTHOPAEDIC    
 HX OTHER SURGICAL    
 cyst removed from back Family History: Family History Problem Relation Age of Onset  Heart Disease Mother  Heart Disease Father  Heart Disease Sister  Breast Cancer Maternal Grandmother  Breast Cancer Paternal Grandmother Social History: 
Social History Tobacco Use  Smoking status: Former Smoker Packs/day: 0.25 Types: Cigarettes  Smokeless tobacco: Never Used  Tobacco comment: Patient states \"I  aint smoking no more d*mn cigarettes after yesterday\" 01/26/2018 Substance Use Topics  Alcohol use: No  
 Drug use: No  
 
 
Allergies: 
No Known Allergies Review of Systems Review of Systems Constitutional: Positive for fatigue. Negative for chills, diaphoresis and fever. HENT: Negative for ear pain and sore throat. Eyes: Negative for pain and redness. Respiratory: Positive for cough, chest tightness and shortness of breath. Cardiovascular: Positive for leg swelling. Negative for chest pain. Chronic lower extremity edema Gastrointestinal: Negative for abdominal pain, diarrhea, nausea and vomiting. Endocrine: Negative for cold intolerance and heat intolerance. Genitourinary: Negative for flank pain and hematuria. Musculoskeletal: Negative for back pain and neck stiffness. Skin: Negative for rash and wound. Neurological: Negative for dizziness, syncope and headaches. All other systems reviewed and are negative. Physical Exam  
Physical Exam  
Constitutional: She is oriented to person, place, and time. Patient is not ill appearing morbidly obese female who appears in no acute distress. She is on resting oxygen at 4 L by nasal cannula cannula she is alert and oriented to time place and person. She follows all commands and is speaking in full sentences. HENT:  
Head: Normocephalic and atraumatic. Mouth/Throat: Oropharynx is clear and moist. No oropharyngeal exudate. Eyes: Pupils are equal, round, and reactive to light.  Conjunctivae and EOM are normal.  
 Neck: Normal range of motion. Cardiovascular: Normal rate and regular rhythm. No murmur heard. Pulmonary/Chest: Effort normal and breath sounds normal. No respiratory distress. She has no wheezes. She has diffuse end expiratory wheezing without crackles. She has no accessory muscle use and no tachypnea. Abdominal: Soft. Bowel sounds are normal. She exhibits no distension. There is no tenderness. Musculoskeletal: Normal range of motion. She exhibits edema. She exhibits no deformity. She has chronic appearing 3+ pitting edema in both lower extremities. There is no asymmetry to her leg circumference. She has chronic venous stasis changes Neurological: She is alert and oriented to person, place, and time. Coordination normal.  
Skin: Skin is warm and dry. No rash noted. Psychiatric: She has a normal mood and affect. Her behavior is normal.  
Nursing note and vitals reviewed. Diagnostic Study Results Labs - No results found for this or any previous visit (from the past 24 hour(s)). Radiologic Studies -  
XR CHEST PORT Final Result IMPRESSION: No evidence of acute cardiopulmonary process. CT Results  (Last 48 hours) None CXR Results  (Last 48 hours) 03/24/19 5332  XR CHEST PORT Final result Impression:  IMPRESSION: No evidence of acute cardiopulmonary process. Narrative:  INDICATION: Shortness of breath COMPARISON: 3/14/2019 FINDINGS: AP portable imaging of the chest performed at 11:08 AM demonstrates a  
stable cardiomediastinal silhouette. The lungs are clear bilaterally. No  
significant osseous abnormalities are seen. Medical Decision Making I am the first provider for this patient. I reviewed the vital signs, available nursing notes, past medical history, past surgical history, family history and social history. Vital Signs-Reviewed the patient's vital signs. No data found. Pulse Oximetry Analysis -92% % on 4 L Cardiac Monitor:  
Rate: 80 bpm 
Rhythm: Normal Sinus Rhythm EKG: Normal sinus rhythm with occasional PVC. No acute ST or T wave abnormalities. Normal OR/QRS/QT intervals. Records Reviewed: Nursing Notes, Old Medical Records and Previous electrocardiograms Differential Diagnosis: 
 
Patient presents with acute dyspnea. DDx: asthma, copd, pna, pulmonary edema, acute bronchitis, ACS, ptx, pna. Will obtain EKG, labs, CXR, provide O2 as needed for hypoxia, treat symptomatically and reassess. Will continue to monitor closely in ED. Provider Notes (Medical Decision Making):  
Patient seen and evaluated. Patient's chest x-ray and laboratory evaluation does not stressed any acute COPD exacerbation. Patient's ABG also does not suggest the patient is in acute respiratory failure. At this time I feel that the patient is challenged by lack of outpatient access. I was speaking with case management to arrange for close home health evaluation, outpatient follow-up arrangement, and and medication prescription management. She will return with any worsening symptoms. ED Course:  
 
Initial assessment performed. The patients presenting problems have been discussed, and they are in agreement with the care plan formulated and outlined with them. I have encouraged them to ask questions as they arise throughout their visit. Critical Care Time:  
 
None none Disposition: 
12:20 PM 
Zeyad Lal  results have been reviewed with her. She has been counseled regarding her diagnosis. She verbally conveys understanding and agreement of the signs, symptoms, diagnosis, treatment and prognosis and additionally agrees to follow up as recommended with Dr. None in 24 - 48 hours. She also agrees with the care-plan and conveys that all of her questions have been answered.   I have also put together some discharge instructions for her that include: 1) educational information regarding their diagnosis, 2) how to care for their diagnosis at home, as well a 3) list of reasons why they would want to return to the ED prior to their follow-up appointment, should their condition change. PLAN: 
1. Discharge Medication List as of 3/25/2019 12:09 AM  
  
START taking these medications Details  
oxyCODONE-acetaminophen (PERCOCET) 5-325 mg per tablet Take 1 Tab by mouth every eight (8) hours as needed for Pain for up to 3 days. Max Daily Amount: 3 Tabs. Indications: Pain, Print, Disp-15 Tab, R-0  
  
  
CONTINUE these medications which have NOT CHANGED Details  
cyclobenzaprine (FLEXERIL) 5 mg tablet Take 1 Tab by mouth two (2) times daily as needed for Muscle Spasm(s). flexeriol, Print, Disp-5 Tab, R-0  
  
albuterol-ipratropium (DUO-NEB) 2.5 mg-0.5 mg/3 ml nebu 3 mL by Nebulization route four (4) times daily. , Normal, Disp-100 Nebule, R-1  
  
furosemide (LASIX) 40 mg tablet Take 40 mg by mouth two (2) times a day., Historical Med  
  
nystatin (MYCOSTATIN) topical cream Apply  to affected area three (3) times daily as needed for Skin Irritation. , Historical Med  
  
nystatin (MYCOSTATIN) powder Apply  to affected area three (3) times daily as needed (skin irratation). , Historical Med  
  
fluticasone (FLONASE) 50 mcg/actuation nasal spray 2 Sprays by Both Nostrils route daily. , Print, Disp-1 Bottle, R-0  
  
insulin glargine (LANTUS) 100 unit/mL injection 40 Units by SubCUTAneous route daily. , Historical Med  
  
metoprolol tartrate (LOPRESSOR) 25 mg tablet Take 25 mg by mouth two (2) times a day., Historical Med  
  
fluticasone-salmeterol (ADVAIR DISKUS) 500-50 mcg/dose diskus inhaler Take 1 Puff by inhalation every twelve (12) hours. , Historical Med  
  
loratadine (CLARITIN) 10 mg tablet Take 10 mg by mouth daily. , Historical Med  
  
albuterol (VENTOLIN HFA) 90 mcg/actuation inhaler Take 2 Puffs by inhalation every six (6) hours as needed for Wheezing., Historical Med  
  
aspirin 81 mg chewable tablet Take 81 mg by mouth daily. , Historical Med  
  
hydrOXYzine pamoate (VISTARIL) 50 mg capsule Take 50 mg by mouth every eight (8) hours as needed for Itching., Historical Med  
  
lovastatin (MEVACOR) 40 mg tablet Take 1 Tab by mouth nightly., Normal, Disp-30 Tab, R-6  
  
glyBURIDE (DIABETA) 5 mg tablet Take 5 mg by mouth two (2) times daily (with meals). , Historical Med  
  
ammonium lactate (LAC-HYDRIN) 12 % topical cream rub in to affected area well, Print, Disp-280 g, R-1  
  
nitroglycerin (NITROSTAT) 0.4 mg SL tablet 1 Tab by SubLINGual route every five (5) minutes as needed for Chest Pain (call 911 if not relieved by 3). , Normal, Disp-25 Tab, R-2  
  
  
 
2. Follow-up Information Follow up With Specialties Details Why Contact Info None    None ([phone]) Patient stated that they have no PCP Return to ED if worse Diagnosis Clinical Impression: 1. Chronic bronchitis, unspecified chronic bronchitis type ([facility] [address]

## 2019-04-04 ENCOUNTER — HOSPITAL ENCOUNTER (INPATIENT)
Age: 49
LOS: 3 days | Discharge: HOME HEALTH | DRG: 189 | End: 2019-04-08
Attending: EMERGENCY MEDICINE | Admitting: HOSPITALIST
Payer: MEDICARE

## 2019-04-04 ENCOUNTER — APPOINTMENT (OUTPATIENT)
Dept: GENERAL RADIOLOGY | Age: 49
DRG: 189 | End: 2019-04-04
Attending: EMERGENCY MEDICINE
Payer: MEDICARE

## 2019-04-04 ENCOUNTER — APPOINTMENT (OUTPATIENT)
Dept: ULTRASOUND IMAGING | Age: 49
DRG: 189 | End: 2019-04-04
Attending: EMERGENCY MEDICINE
Payer: MEDICARE

## 2019-04-04 DIAGNOSIS — Z91.14 HISTORY OF MEDICATION NONCOMPLIANCE: ICD-10-CM

## 2019-04-04 DIAGNOSIS — I89.0 LYMPHEDEMA OF BOTH LOWER EXTREMITIES: ICD-10-CM

## 2019-04-04 DIAGNOSIS — E66.01 MORBID OBESITY (HCC): ICD-10-CM

## 2019-04-04 DIAGNOSIS — J44.1 ACUTE EXACERBATION OF CHRONIC OBSTRUCTIVE PULMONARY DISEASE (COPD) (HCC): Primary | ICD-10-CM

## 2019-04-04 DIAGNOSIS — J96.21 ACUTE ON CHRONIC RESPIRATORY FAILURE WITH HYPOXIA AND HYPERCAPNIA (HCC): ICD-10-CM

## 2019-04-04 DIAGNOSIS — J96.22 ACUTE ON CHRONIC RESPIRATORY FAILURE WITH HYPOXIA AND HYPERCAPNIA (HCC): ICD-10-CM

## 2019-04-04 DIAGNOSIS — R06.02 SHORTNESS OF BREATH: ICD-10-CM

## 2019-04-04 LAB
ALBUMIN SERPL-MCNC: 3 G/DL (ref 3.5–5)
ALBUMIN/GLOB SERPL: 0.6 {RATIO} (ref 1.1–2.2)
ALP SERPL-CCNC: 97 U/L (ref 45–117)
ALT SERPL-CCNC: 16 U/L (ref 12–78)
ANION GAP SERPL CALC-SCNC: 2 MMOL/L (ref 5–15)
ARTERIAL PATENCY WRIST A: YES
AST SERPL-CCNC: 12 U/L (ref 15–37)
BASE EXCESS BLD CALC-SCNC: 11 MMOL/L
BASOPHILS # BLD: 0 K/UL (ref 0–0.1)
BASOPHILS NFR BLD: 0 % (ref 0–1)
BDY SITE: ABNORMAL
BILIRUB SERPL-MCNC: 0.3 MG/DL (ref 0.2–1)
BNP SERPL-MCNC: 287 PG/ML
BUN SERPL-MCNC: 18 MG/DL (ref 6–20)
BUN/CREAT SERPL: 26 (ref 12–20)
CALCIUM SERPL-MCNC: 10 MG/DL (ref 8.5–10.1)
CHLORIDE SERPL-SCNC: 100 MMOL/L (ref 97–108)
CK SERPL-CCNC: 28 U/L (ref 26–192)
CO2 SERPL-SCNC: 36 MMOL/L (ref 21–32)
CREAT SERPL-MCNC: 0.69 MG/DL (ref 0.55–1.02)
DIFFERENTIAL METHOD BLD: ABNORMAL
EOSINOPHIL # BLD: 0.2 K/UL (ref 0–0.4)
EOSINOPHIL NFR BLD: 3 % (ref 0–7)
ERYTHROCYTE [DISTWIDTH] IN BLOOD BY AUTOMATED COUNT: 13.2 % (ref 11.5–14.5)
GAS FLOW.O2 O2 DELIVERY SYS: ABNORMAL L/MIN
GAS FLOW.O2 SETTING OXYMISER: 5 L/M
GLOBULIN SER CALC-MCNC: 5.1 G/DL (ref 2–4)
GLUCOSE SERPL-MCNC: 129 MG/DL (ref 65–100)
HCO3 BLD-SCNC: 37.3 MMOL/L (ref 22–26)
HCT VFR BLD AUTO: 42 % (ref 35–47)
HGB BLD-MCNC: 12.2 G/DL (ref 11.5–16)
IMM GRANULOCYTES # BLD AUTO: 0 K/UL (ref 0–0.04)
IMM GRANULOCYTES NFR BLD AUTO: 0 % (ref 0–0.5)
LACTATE BLD-SCNC: 0.73 MMOL/L (ref 0.4–2)
LYMPHOCYTES # BLD: 1 K/UL (ref 0.8–3.5)
LYMPHOCYTES NFR BLD: 11 % (ref 12–49)
MCH RBC QN AUTO: 28.1 PG (ref 26–34)
MCHC RBC AUTO-ENTMCNC: 29 G/DL (ref 30–36.5)
MCV RBC AUTO: 96.8 FL (ref 80–99)
MONOCYTES # BLD: 0.8 K/UL (ref 0–1)
MONOCYTES NFR BLD: 8 % (ref 5–13)
NEUTS SEG # BLD: 7.2 K/UL (ref 1.8–8)
NEUTS SEG NFR BLD: 78 % (ref 32–75)
NRBC # BLD: 0 K/UL (ref 0–0.01)
NRBC BLD-RTO: 0 PER 100 WBC
PCO2 BLD: 73.6 MMHG (ref 35–45)
PH BLD: 7.31 [PH] (ref 7.35–7.45)
PLATELET # BLD AUTO: 164 K/UL (ref 150–400)
PMV BLD AUTO: 10.9 FL (ref 8.9–12.9)
PO2 BLD: 74 MMHG (ref 80–100)
POTASSIUM SERPL-SCNC: 3.8 MMOL/L (ref 3.5–5.1)
PROT SERPL-MCNC: 8.1 G/DL (ref 6.4–8.2)
RBC # BLD AUTO: 4.34 M/UL (ref 3.8–5.2)
SAO2 % BLD: 92 % (ref 92–97)
SODIUM SERPL-SCNC: 138 MMOL/L (ref 136–145)
SPECIMEN TYPE: ABNORMAL
TOTAL RESP. RATE, ITRR: 20
TROPONIN I SERPL-MCNC: <0.05 NG/ML
WBC # BLD AUTO: 9.4 K/UL (ref 3.6–11)

## 2019-04-04 PROCEDURE — 77030013033 HC MSK BPAP/CPAP MMKA -B

## 2019-04-04 PROCEDURE — 36600 WITHDRAWAL OF ARTERIAL BLOOD: CPT

## 2019-04-04 PROCEDURE — 74011250636 HC RX REV CODE- 250/636: Performed by: EMERGENCY MEDICINE

## 2019-04-04 PROCEDURE — 85025 COMPLETE CBC W/AUTO DIFF WBC: CPT

## 2019-04-04 PROCEDURE — 94640 AIRWAY INHALATION TREATMENT: CPT

## 2019-04-04 PROCEDURE — 80053 COMPREHEN METABOLIC PANEL: CPT

## 2019-04-04 PROCEDURE — 83605 ASSAY OF LACTIC ACID: CPT

## 2019-04-04 PROCEDURE — 93005 ELECTROCARDIOGRAM TRACING: CPT

## 2019-04-04 PROCEDURE — 99285 EMERGENCY DEPT VISIT HI MDM: CPT

## 2019-04-04 PROCEDURE — 74011000250 HC RX REV CODE- 250: Performed by: EMERGENCY MEDICINE

## 2019-04-04 PROCEDURE — 74011250637 HC RX REV CODE- 250/637: Performed by: EMERGENCY MEDICINE

## 2019-04-04 PROCEDURE — 87040 BLOOD CULTURE FOR BACTERIA: CPT

## 2019-04-04 PROCEDURE — 83880 ASSAY OF NATRIURETIC PEPTIDE: CPT

## 2019-04-04 PROCEDURE — 84484 ASSAY OF TROPONIN QUANT: CPT

## 2019-04-04 PROCEDURE — 71045 X-RAY EXAM CHEST 1 VIEW: CPT

## 2019-04-04 PROCEDURE — 82550 ASSAY OF CK (CPK): CPT

## 2019-04-04 PROCEDURE — 82803 BLOOD GASES ANY COMBINATION: CPT

## 2019-04-04 PROCEDURE — 77030029684 HC NEB SM VOL KT MONA -A

## 2019-04-04 PROCEDURE — 36415 COLL VENOUS BLD VENIPUNCTURE: CPT

## 2019-04-04 PROCEDURE — 96374 THER/PROPH/DIAG INJ IV PUSH: CPT

## 2019-04-04 RX ORDER — FENTANYL CITRATE 50 UG/ML
50 INJECTION, SOLUTION INTRAMUSCULAR; INTRAVENOUS
Status: DISCONTINUED | OUTPATIENT
Start: 2019-04-04 | End: 2019-04-04

## 2019-04-04 RX ORDER — OXYCODONE HYDROCHLORIDE 5 MG/1
5 TABLET ORAL
Status: COMPLETED | OUTPATIENT
Start: 2019-04-04 | End: 2019-04-04

## 2019-04-04 RX ORDER — IPRATROPIUM BROMIDE AND ALBUTEROL SULFATE 2.5; .5 MG/3ML; MG/3ML
3 SOLUTION RESPIRATORY (INHALATION)
Status: COMPLETED | OUTPATIENT
Start: 2019-04-04 | End: 2019-04-04

## 2019-04-04 RX ORDER — ALBUTEROL SULFATE 0.83 MG/ML
5 SOLUTION RESPIRATORY (INHALATION)
Status: DISCONTINUED | OUTPATIENT
Start: 2019-04-04 | End: 2019-04-05

## 2019-04-04 RX ADMIN — IPRATROPIUM BROMIDE AND ALBUTEROL SULFATE 3 ML: .5; 3 SOLUTION RESPIRATORY (INHALATION) at 21:41

## 2019-04-04 RX ADMIN — OXYCODONE HYDROCHLORIDE 5 MG: 5 TABLET ORAL at 22:40

## 2019-04-04 RX ADMIN — METHYLPREDNISOLONE SODIUM SUCCINATE 125 MG: 125 INJECTION, POWDER, FOR SOLUTION INTRAMUSCULAR; INTRAVENOUS at 21:40

## 2019-04-05 ENCOUNTER — APPOINTMENT (OUTPATIENT)
Dept: ULTRASOUND IMAGING | Age: 49
DRG: 189 | End: 2019-04-05
Attending: EMERGENCY MEDICINE
Payer: MEDICARE

## 2019-04-05 PROBLEM — J96.22 ACUTE ON CHRONIC RESPIRATORY FAILURE WITH HYPERCAPNIA (HCC): Status: ACTIVE | Noted: 2019-04-05

## 2019-04-05 LAB
ATRIAL RATE: 84 BPM
CALCULATED P AXIS, ECG09: 56 DEGREES
CALCULATED R AXIS, ECG10: 93 DEGREES
CALCULATED T AXIS, ECG11: 54 DEGREES
DIAGNOSIS, 93000: NORMAL
GLUCOSE BLD STRIP.AUTO-MCNC: 224 MG/DL (ref 65–100)
GLUCOSE BLD STRIP.AUTO-MCNC: 245 MG/DL (ref 65–100)
GLUCOSE BLD STRIP.AUTO-MCNC: 267 MG/DL (ref 65–100)
GLUCOSE BLD STRIP.AUTO-MCNC: 298 MG/DL (ref 65–100)
P-R INTERVAL, ECG05: 186 MS
Q-T INTERVAL, ECG07: 372 MS
QRS DURATION, ECG06: 82 MS
QTC CALCULATION (BEZET), ECG08: 439 MS
SERVICE CMNT-IMP: ABNORMAL
TROPONIN I SERPL-MCNC: <0.05 NG/ML
VENTRICULAR RATE, ECG03: 84 BPM

## 2019-04-05 PROCEDURE — 77010033678 HC OXYGEN DAILY

## 2019-04-05 PROCEDURE — 74011250637 HC RX REV CODE- 250/637: Performed by: EMERGENCY MEDICINE

## 2019-04-05 PROCEDURE — 84484 ASSAY OF TROPONIN QUANT: CPT

## 2019-04-05 PROCEDURE — 82962 GLUCOSE BLOOD TEST: CPT

## 2019-04-05 PROCEDURE — 74011250637 HC RX REV CODE- 250/637: Performed by: HOSPITALIST

## 2019-04-05 PROCEDURE — 74011636637 HC RX REV CODE- 636/637: Performed by: HOSPITALIST

## 2019-04-05 PROCEDURE — 94640 AIRWAY INHALATION TREATMENT: CPT

## 2019-04-05 PROCEDURE — 93971 EXTREMITY STUDY: CPT

## 2019-04-05 PROCEDURE — 94660 CPAP INITIATION&MGMT: CPT

## 2019-04-05 PROCEDURE — 74011000250 HC RX REV CODE- 250: Performed by: HOSPITALIST

## 2019-04-05 PROCEDURE — 36415 COLL VENOUS BLD VENIPUNCTURE: CPT

## 2019-04-05 PROCEDURE — 65660000000 HC RM CCU STEPDOWN

## 2019-04-05 PROCEDURE — 74011250636 HC RX REV CODE- 250/636: Performed by: HOSPITALIST

## 2019-04-05 RX ORDER — IPRATROPIUM BROMIDE AND ALBUTEROL SULFATE 2.5; .5 MG/3ML; MG/3ML
3 SOLUTION RESPIRATORY (INHALATION) 4 TIMES DAILY
Status: DISCONTINUED | OUTPATIENT
Start: 2019-04-05 | End: 2019-04-05

## 2019-04-05 RX ORDER — MAGNESIUM SULFATE 100 %
4 CRYSTALS MISCELLANEOUS AS NEEDED
Status: DISCONTINUED | OUTPATIENT
Start: 2019-04-05 | End: 2019-04-08 | Stop reason: HOSPADM

## 2019-04-05 RX ORDER — GUAIFENESIN 100 MG/5ML
81 LIQUID (ML) ORAL DAILY
Status: DISCONTINUED | OUTPATIENT
Start: 2019-04-05 | End: 2019-04-08 | Stop reason: HOSPADM

## 2019-04-05 RX ORDER — FUROSEMIDE 10 MG/ML
40 INJECTION INTRAMUSCULAR; INTRAVENOUS 2 TIMES DAILY
Status: DISCONTINUED | OUTPATIENT
Start: 2019-04-05 | End: 2019-04-06

## 2019-04-05 RX ORDER — ALBUTEROL SULFATE 0.83 MG/ML
2.5 SOLUTION RESPIRATORY (INHALATION)
Status: DISCONTINUED | OUTPATIENT
Start: 2019-04-05 | End: 2019-04-08 | Stop reason: HOSPADM

## 2019-04-05 RX ORDER — METOPROLOL TARTRATE 25 MG/1
25 TABLET, FILM COATED ORAL 2 TIMES DAILY
Status: DISCONTINUED | OUTPATIENT
Start: 2019-04-05 | End: 2019-04-08 | Stop reason: HOSPADM

## 2019-04-05 RX ORDER — FUROSEMIDE 40 MG/1
40 TABLET ORAL 2 TIMES DAILY
Status: CANCELLED | OUTPATIENT
Start: 2019-04-05

## 2019-04-05 RX ORDER — SODIUM CHLORIDE 0.9 % (FLUSH) 0.9 %
5-40 SYRINGE (ML) INJECTION AS NEEDED
Status: DISCONTINUED | OUTPATIENT
Start: 2019-04-05 | End: 2019-04-08 | Stop reason: HOSPADM

## 2019-04-05 RX ORDER — ONDANSETRON 2 MG/ML
4 INJECTION INTRAMUSCULAR; INTRAVENOUS
Status: DISCONTINUED | OUTPATIENT
Start: 2019-04-05 | End: 2019-04-08 | Stop reason: HOSPADM

## 2019-04-05 RX ORDER — CYCLOBENZAPRINE HCL 10 MG
5 TABLET ORAL
Status: DISCONTINUED | OUTPATIENT
Start: 2019-04-05 | End: 2019-04-08 | Stop reason: HOSPADM

## 2019-04-05 RX ORDER — FLUTICASONE FUROATE AND VILANTEROL 100; 25 UG/1; UG/1
POWDER RESPIRATORY (INHALATION) DAILY
Status: DISCONTINUED | OUTPATIENT
Start: 2019-04-05 | End: 2019-04-08 | Stop reason: HOSPADM

## 2019-04-05 RX ORDER — INSULIN LISPRO 100 [IU]/ML
INJECTION, SOLUTION INTRAVENOUS; SUBCUTANEOUS
Status: DISCONTINUED | OUTPATIENT
Start: 2019-04-05 | End: 2019-04-08 | Stop reason: HOSPADM

## 2019-04-05 RX ORDER — IPRATROPIUM BROMIDE AND ALBUTEROL SULFATE 2.5; .5 MG/3ML; MG/3ML
3 SOLUTION RESPIRATORY (INHALATION)
Status: DISCONTINUED | OUTPATIENT
Start: 2019-04-05 | End: 2019-04-08 | Stop reason: HOSPADM

## 2019-04-05 RX ORDER — PRAVASTATIN SODIUM 40 MG/1
40 TABLET ORAL
Status: DISCONTINUED | OUTPATIENT
Start: 2019-04-05 | End: 2019-04-08 | Stop reason: HOSPADM

## 2019-04-05 RX ORDER — SODIUM CHLORIDE 0.9 % (FLUSH) 0.9 %
5-40 SYRINGE (ML) INJECTION EVERY 8 HOURS
Status: DISCONTINUED | OUTPATIENT
Start: 2019-04-05 | End: 2019-04-08 | Stop reason: HOSPADM

## 2019-04-05 RX ORDER — DEXTROSE MONOHYDRATE 25 G/50ML
12.5-25 INJECTION, SOLUTION INTRAVENOUS AS NEEDED
Status: DISCONTINUED | OUTPATIENT
Start: 2019-04-05 | End: 2019-04-08 | Stop reason: HOSPADM

## 2019-04-05 RX ORDER — ACETAMINOPHEN 325 MG/1
650 TABLET ORAL
Status: DISCONTINUED | OUTPATIENT
Start: 2019-04-05 | End: 2019-04-08 | Stop reason: HOSPADM

## 2019-04-05 RX ORDER — INSULIN GLARGINE 100 [IU]/ML
40 INJECTION, SOLUTION SUBCUTANEOUS DAILY
Status: DISCONTINUED | OUTPATIENT
Start: 2019-04-05 | End: 2019-04-08 | Stop reason: HOSPADM

## 2019-04-05 RX ORDER — HEPARIN SODIUM 5000 [USP'U]/ML
5000 INJECTION, SOLUTION INTRAVENOUS; SUBCUTANEOUS EVERY 8 HOURS
Status: DISCONTINUED | OUTPATIENT
Start: 2019-04-05 | End: 2019-04-08 | Stop reason: HOSPADM

## 2019-04-05 RX ADMIN — CYCLOBENZAPRINE HYDROCHLORIDE 5 MG: 10 TABLET, FILM COATED ORAL at 21:29

## 2019-04-05 RX ADMIN — ASPIRIN 81 MG 81 MG: 81 TABLET ORAL at 08:47

## 2019-04-05 RX ADMIN — FUROSEMIDE 40 MG: 10 INJECTION, SOLUTION INTRAMUSCULAR; INTRAVENOUS at 18:00

## 2019-04-05 RX ADMIN — INSULIN LISPRO 5 UNITS: 100 INJECTION, SOLUTION INTRAVENOUS; SUBCUTANEOUS at 11:58

## 2019-04-05 RX ADMIN — INSULIN LISPRO 2 UNITS: 100 INJECTION, SOLUTION INTRAVENOUS; SUBCUTANEOUS at 21:29

## 2019-04-05 RX ADMIN — IPRATROPIUM BROMIDE AND ALBUTEROL SULFATE 3 ML: .5; 3 SOLUTION RESPIRATORY (INHALATION) at 14:17

## 2019-04-05 RX ADMIN — IPRATROPIUM BROMIDE AND ALBUTEROL SULFATE 3 ML: .5; 3 SOLUTION RESPIRATORY (INHALATION) at 19:32

## 2019-04-05 RX ADMIN — PRAVASTATIN SODIUM 40 MG: 40 TABLET ORAL at 21:29

## 2019-04-05 RX ADMIN — Medication 10 ML: at 21:30

## 2019-04-05 RX ADMIN — HEPARIN SODIUM 5000 UNITS: 5000 INJECTION INTRAVENOUS; SUBCUTANEOUS at 11:57

## 2019-04-05 RX ADMIN — FLUTICASONE FUROATE AND VILANTEROL TRIFENATATE: 100; 25 POWDER RESPIRATORY (INHALATION) at 08:48

## 2019-04-05 RX ADMIN — METOPROLOL TARTRATE 25 MG: 25 TABLET ORAL at 08:47

## 2019-04-05 RX ADMIN — Medication 10 ML: at 06:13

## 2019-04-05 RX ADMIN — IPRATROPIUM BROMIDE AND ALBUTEROL SULFATE 3 ML: .5; 3 SOLUTION RESPIRATORY (INHALATION) at 08:13

## 2019-04-05 RX ADMIN — INSULIN LISPRO 3 UNITS: 100 INJECTION, SOLUTION INTRAVENOUS; SUBCUTANEOUS at 16:48

## 2019-04-05 RX ADMIN — METOPROLOL TARTRATE 25 MG: 25 TABLET ORAL at 17:49

## 2019-04-05 RX ADMIN — HEPARIN SODIUM 5000 UNITS: 5000 INJECTION INTRAVENOUS; SUBCUTANEOUS at 06:13

## 2019-04-05 RX ADMIN — INSULIN GLARGINE 40 UNITS: 100 INJECTION, SOLUTION SUBCUTANEOUS at 08:48

## 2019-04-05 RX ADMIN — HEPARIN SODIUM 5000 UNITS: 5000 INJECTION INTRAVENOUS; SUBCUTANEOUS at 21:29

## 2019-04-05 RX ADMIN — FUROSEMIDE 40 MG: 10 INJECTION, SOLUTION INTRAMUSCULAR; INTRAVENOUS at 08:48

## 2019-04-05 RX ADMIN — INSULIN LISPRO 5 UNITS: 100 INJECTION, SOLUTION INTRAVENOUS; SUBCUTANEOUS at 08:48

## 2019-04-05 NOTE — PROGRESS NOTES
PCU SHIFT NURSING NOTE Bedside and Verbal shift change report given to NORMA Curry (oncoming nurse) by Gomez Lopez RN (offgoing nurse). Report included the following information SBAR, Kardex, Intake/Output, MAR, Accordion, Recent Results, Med Rec Status, Cardiac Rhythm Sinus Tach and Alarm Parameters . Shift Summary:  
 
7:47 AM - Patient refusing BIPAP.  5 L NC applied. Will continue to monitor. 3:52 PM - Wound care completed per order. Admission Date 4/4/2019 Admission Diagnosis Acute on chronic respiratory failure with hypercapnia (HCC) [J96.22] Consults IP CONSULT TO HOSPITALIST Consults []PT []OT []Speech  
[]Case Management  
  
[] Palliative Cardiac Monitoring Order  
[x]Yes []No  
 
IV drips []Yes Drip:                            Dose: 
Drip:                            Dose: 
Drip:                            Dose:  
[x]No  
 
GI Prophylaxis []Yes  
[x]No  
 
 
 
DVT Prophylaxis SCDs:     
     
 Haroon stockings:     
  
[x] Medication []Contraindicated []None Activity Level Activity Level: Up with Assistance Activity Assistance: Partial (one person) Purposeful Rounding every 1-2 hour? [x]Yes Ogden Score  Total Score: 3 Bed Alarm (If score 3 or >) [x]Yes  
[] Refused (See signed refusal form in chart) Jeffrey Score  Jeffrey Score: 19 Jeffrey Score (if score 14 or less) []PMT consult [x]Wound Care consult []Specialty bed  
[] Nutrition consult Needs prior to discharge:  
Home O2 required:   
[x]Yes []No  
 If yes, how much O2 required? 5 L Other:  
 Last Bowel Movement: Last Bowel Movement Date: 04/05/19 Influenza Vaccine Pneumonia Vaccine Diet Active Orders Diet DIET CARDIAC Regular LDAs Peripheral IV 04/04/19 Right Antecubital (Active) Site Assessment Clean, dry, & intact 4/5/2019  7:19 AM  
Phlebitis Assessment 0 4/5/2019  7:19 AM  
 Infiltration Assessment 0 4/5/2019  7:19 AM  
Dressing Status Clean, dry, & intact 4/5/2019  7:19 AM  
Dressing Type Transparent 4/4/2019  9:49 PM  
Hub Color/Line Status Pink 4/4/2019  9:49 PM  
                  
Urinary Catheter Intake & Output Date 04/04/19 0700 - 04/05/19 0659 04/05/19 0700 - 04/06/19 1502 Shift 3973-0458 7026-1452 24 Hour Total 3313-6565 7265-9495 24 Hour Total  
INTAKE Shift Total(mL/kg) OUTPUT Urine    375  375 Urine Voided    375  375 Shift Total(mL/kg)    375(1.9)  375(1.9) NET    -375  -375 Weight (kg)  192.4 192.4 192.4 192.4 192.4 Readmission Risk Assessment Tool Score High Risk   
      
 30 Total Score 3 Has Seen PCP in Last 6 Months (Yes=3, No=0)  
 11 IP Visits Last 12 Months (1-3=4, 4=9, >4=11) 9 Pt. Coverage (Medicare=5 , Medicaid, or Self-Pay=4) 7 Charlson Comorbidity Score (Age + Comorbid Conditions) Criteria that do not apply:  
 . Living with Significant Other. Assisted Living. LTAC. SNF. or  
Rehab Patient Length of Stay (>5 days = 3) Expected Length of Stay - - - Actual Length of Stay 0

## 2019-04-05 NOTE — PROGRESS NOTES
Bedside shift change report given to Kelsey Parra RN (oncoming nurse) by Libia Lambert RN (offgoing nurse). Report included the following information SBAR, Kardex, MAR, Recent Results and Cardiac Rhythm NSR.

## 2019-04-05 NOTE — ED NOTES
RT at bedside setting up bipap. Per MD, okay for pt to eat prior to bipap being placed. Provided pt with boxed lunch and diet ginger ale.

## 2019-04-05 NOTE — CONSULTS
[address] [facility]     Date of  Admission: 4/4/2019  8:36 PM     Admission type:Emergency    Consult for: chest pain  Consult by: hospitalist      Subjective:     Júnior Villalobos is a 57 y.o. female with PMH GEN, SVT, COPD, dHF, lymphedema, NSTEMI who was admitted for Acute on chronic respiratory failure with hypercapnia ([address] [J96.22]. Per ED provider note Júnior Villalobos presented to the ED with c/o worsening SPB, chest pressure, and cough for 3 days. Cardiology consulted for chest pain. On assessment, Júnior Villalobos states she's been having increased SOB since December 2018. She has not been using her home trilogy for ~1 month, but will not give a direct answer why. She has been having intermittent chest pressure that radiates to her back for appx 1 month. The pressure lasts a few minutes and has no specific aggravating or alleviating factors. Her SOB is worse when it occurs. She denies current chest pain, palpitations. She has trouble laying flat, but this is not new. Chronic leg swelling from lymphedema. Júnior Villalobos  follows with Dr. Lacy Stock for cardiology. Last ECHO was not able to be visualized due to body habitus 01/19. ECHO prior year 01/18 with EF 55-60% and interpretation limited by body habitus. Cath 2012 with REED to Cx and a  noted in LAD.             Patient Active Problem List    Diagnosis Date Noted    CAP (community acquired pneumonia) 01/13/2016     Priority: 2 - Two     Class: Present on Admission    Cough with hemoptysis 01/10/2016     Priority: 2 - Two     Class: Present on Admission    Lymphedema of both lower extremities 01/10/2016     Priority: 3 - Three     Class: Chronic    Acute on chronic respiratory failure with hypercapnia ([address] 04/05/2019    Acute on chronic respiratory failure with hypoxia ([address] 03/15/2019    Acute chest pain 01/03/2019    Productive cough 10/22/2018    Other fatigue 10/22/2018    PNA (pneumonia) 09/24/2018    Counseling regarding goals of care 08/23/2018    Acute pancreatitis 08/23/2018    Breast cancer ([facility] 08/23/2018    Intertrigo 08/23/2018    Elevated lipase 08/21/2018    Abdominal pain 08/21/2018    Respiratory failure ([facility] 08/19/2018    NSVT (nonsustained ventricular tachycardia) ([facility] 07/12/2018    Acute on chronic respiratory failure with hypoxia and hypercapnia (HCC) 01/30/2018    Acute respiratory failure (HCC) 01/22/2018    Multifocal pneumonia 09/27/2017    Shortness of breath 09/24/2017    Maggot infestation 07/25/2017    COPD exacerbation ([facility] 10/28/2016    Cellulitis, leg 08/18/2016    Acute respiratory failure with hypoxia and hypercarbia (HCC) 01/10/2016     Class: Present on Admission    SIRS due to infectious process with acute organ dysfunction ([facility] 01/10/2016     Class: Acute    Gangrenous toe ([facility] 01/09/2016    Type II diabetes mellitus, uncontrolled ([facility] 01/09/2016    HTN (hypertension) 94/95/1166    Diastolic CHF, chronic ([facility] 01/09/2016    COPD (chronic obstructive pulmonary disease) ([facility] 07/21/2015     Class: Chronic    Sepsis associated hypotension ([facility] 07/21/2015    Cellulitis 05/25/2015    Tobacco abuse 01/13/2013    Obesity, morbid (more than 100 lbs over ideal weight or BMI > 40) ([facility] 01/13/2013    Hypoxia 10/22/2012    Noncompliance with therapeutic plan 10/22/2012    Chest pain 10/22/2012    GERD (gastroesophageal reflux disease) 10/22/2012    S/P PTCA (percutaneous transluminal coronary angioplasty) 08/22/2012    Coronary atherosclerosis of native coronary artery 08/22/2012    NSTEMI (non-ST elevated myocardial infarction) ([facility] 08/18/2012    Acute on chronic diastolic heart failure ([facility] 08/13/2012    Malignant essential hypertension 08/04/2012    Asthma 08/04/2012     Class: Chronic    Obesity hypoventilation syndrome ([facility] 08/04/2012    Dyspnea 08/04/2012    Chest pressure 08/04/2012      None  Past Medical History:   Diagnosis Date    Arthritis     Asthma     Breast lump     CAD (coronary artery disease)     Congestive heart failure (HCC)     COPD [facility]ch[facility]) [facility]     Diabetes ([facility]     Hypertension     Morbid obesity with BMI of 70 and over, adult ([facility]     NSTEMI (non-ST elevated myocardial infarction) ([facility]     SVT (supraventricular tachycardia) ([place] [id number]       Social History     Socioeconomic History    Marital status: SINGLE     Spouse name: Not on file    Number of children: Not on file    Years of education: Not on file    Highest education level: Not on file   Tobacco Use    Smoking status: Former Smoker     Packs/day: 0.25     Types: Cigarettes    Smokeless tobacco: Never Used    Tobacco comment: Patient states \"I  aint smoking no more d*mn cigarettes after yesterday\" 01/26/2018   Substance and Sexual Activity    Alcohol use: No    Drug use: No   Social History Narrative    ** Merged History Encounter **          No Known Allergies   Family History   Problem Relation Age of Onset    Heart Disease Mother     Heart Disease Father     Heart Disease Sister     Breast Cancer Maternal Grandmother     Breast Cancer Paternal Grandmother       Current Facility-Administered Medications   Medication Dose Route Frequency    aspirin chewable tablet 81 mg  81 mg Oral DAILY    fluticasone-vilanterol (BREO ELLIPTA) 100mcg-25mcg/puff   Inhalation DAILY    insulin glargine (LANTUS) injection 40 Units  40 Units SubCUTAneous DAILY    pravastatin (PRAVACHOL) tablet 40 mg  40 mg Oral QHS    metoprolol tartrate (LOPRESSOR) tablet 25 mg  25 mg Oral BID    sodium chloride (NS) flush 5-40 mL  5-40 mL IntraVENous Q8H    sodium chloride (NS) flush 5-40 mL  5-40 mL IntraVENous PRN    acetaminophen (TYLENOL) tablet 650 mg  650 mg Oral Q4H PRN    ondansetron (ZOFRAN) injection 4 mg  4 mg IntraVENous Q4H PRN    heparin (porcine) injection 5,000 Units  5,000 Units SubCUTAneous Q8H    albuterol (PROVENTIL VENTOLIN) nebulizer solution 2.5 mg  2.5 mg Nebulization Q4H PRN    insulin lispro (HUMALOG) injection   SubCUTAneous AC&HS    glucose chewable tablet 16 g  4 Tab Oral PRN    dextrose (D50) infusion 12.5-25 g  12.5-25 g IntraVENous PRN    glucagon (GLUCAGEN) injection 1 mg  1 mg IntraMUSCular PRN    furosemide (LASIX) injection 40 mg  40 mg IntraVENous BID    albuterol-ipratropium (DUO-NEB) 2.5 MG-0.5 MG/3 ML  3 mL Nebulization TID RT        Review of Symptoms:   11 systems reviewed, negative other than as stated in the HPI        Objective:      Visit Vitals  /86   Pulse 97   Temp 98 °F (36.7 °C)   Resp 20   Ht 5' 6\" (1.676 m)   Wt (!) 192.4 kg (424 lb 2.6 oz)   SpO2 94%   BMI 68.46 kg/m²       Physical:   General: morbidly obese AAF sitting in chair in NAD  Heart: RRR, unable to auscultate heart sounds with isolation stethoscope  Lungs: dim  Abdomen: Soft, +BS, NTND   Extremities:palpable distal pulses;  Lymphedema L>R with skin changes to LLE   Neurologic: Grossly normal    Data Review:   Recent Labs     04/04/19  2131   WBC 9.4   HGB 12.2   HCT 42.0        Recent Labs     04/04/19  2131      K 3.8      CO2 36*   *   BUN 18   CREA 0.69   CA 10.0   ALB 3.0*   TBILI 0.3   SGOT 12*   ALT 16       Recent Labs     04/05/19  1347 04/04/19  2131   Jeff Newell <0.05 <0.05         Intake/Output Summary (Last 24 hours) at 4/5/2019 1542  Last data filed at 4/5/2019 0957  Gross per 24 hour   Intake    Output 1525 ml   Net -1525 ml        Cardiographics    Telemetry: SR-ST  ECG: SR  Echocardiogram: as above  CXRAY: no acute process        Assessment:       Principal Problem:    Acute on chronic respiratory failure with hypercapnia ([address] (4/5/2019)         Plan:     Jaun Aguilar is a 57 y.o. female who presented to the hospital with increased SOB, chest pressure, and cough x 3 days.   Cardiology consulted for chest pain. Has not been using home trilogy for ~1 month. CXR clear. Troponin negative. proBNP 287 - similar to prior levels. Patient sitting in chair speaking full sentences without difficulty. · Does not appear to be in acute heart failure. Has history of chronic diastolic heart failure. CXR clear. Does have fluid in legs with her lymphedema. · Unable to obtain new ECHO due to patient's body habitus   · Will be unable to perform stress test due to patients size. She is within limits for cath lab, but she is worried that she cannot lay flat. Consider cardiac cath to further evaluate. May need bipap during. Dr. Dandy Hdz to discuss options with patient. · Continue ASA, statin, BB for patient's CAD history/risk  · Continue BB for HTN  · COPD/GEN per hospitalist/pulmonary        Thank you for consulting [facility]    Uday Ojeda NP DNP, RN, [facility]    4/5/2019         Patient seen, examined by me personally. Plan discussed as detailed. Agree with note as outlined by  NP. I confirm findings in history and physical exam. No additional findings noted. Agree with plan as outlined above. Her supra morbid obesity and medical non compliance pose a significant challenge for optimal treatment. Non invasive testing will of of limited/no benefit. Consider cath/PCI if remains symptomatic. Will discuss with dr. Apolonia Estes.     Xuan Goyal MD

## 2019-04-05 NOTE — PROGRESS NOTES
Pharmacy Medication Reconciliation The patient was interviewed regarding current PTA medication list, use and drug allergies. The patient was questioned regarding use of any other inhalers, topical products, over the counter medications, herbal medications, vitamin products or ophthalmic/nasal/otic medication use. Allergy Update: Patient has no known allergies. Recommendations/Findings: The following amendments were made to the patient's active medication list on file at [facility]:  
1) Additions: None 2) Deletions: None 3) Changes: OLD: insulin glargine (LANTUS) 100 unit/mL injection 40 U daily NEW: insulin glargine (LANTUS) 100 unit/mL injection 45 U daily 
 
 
-Clarified PTA med list with Pt. PTA medication list was corrected to the following:  
 
Prior to Admission Medications Prescriptions Last Dose Informant Patient Reported? Taking? albuterol (VENTOLIN HFA) 90 mcg/actuation inhaler  Self Yes Yes Sig: Take 2 Puffs by inhalation every six (6) hours as needed for Wheezing. albuterol-ipratropium (DUO-NEB) 2.5 mg-0.5 mg/3 ml nebu   No Yes Sig: 3 mL by Nebulization route four (4) times daily. ammonium lactate (LAC-HYDRIN) 12 % topical cream  Self No Yes Sig: rub in to affected area well  
aspirin 81 mg chewable tablet  Self Yes Yes Sig: Take 81 mg by mouth daily. cyclobenzaprine (FLEXERIL) 5 mg tablet   No Yes Sig: Take 1 Tab by mouth two (2) times daily as needed for Muscle Spasm(s). flexeriol  
fluticasone (FLONASE) 50 mcg/actuation nasal spray  Self No Yes Si Sprays by Both Nostrils route daily. fluticasone-salmeterol (ADVAIR DISKUS) 500-50 mcg/dose diskus inhaler  Self Yes Yes Sig: Take 1 Puff by inhalation every twelve (12) hours. furosemide (LASIX) 40 mg tablet  Self Yes Yes Sig: Take 40 mg by mouth two (2) times a day. glyBURIDE (DIABETA) 5 mg tablet  Self Yes Yes Sig: Take 5 mg by mouth two (2) times daily (with meals). hydrOXYzine pamoate (VISTARIL) 50 mg capsule  Self Yes Yes Sig: Take 50 mg by mouth every eight (8) hours as needed for Itching. insulin glargine (LANTUS) 100 unit/mL injection  Self Yes Yes Si Units by SubCUTAneous route daily. loratadine (CLARITIN) 10 mg tablet  Self Yes Yes Sig: Take 10 mg by mouth daily. lovastatin (MEVACOR) 40 mg tablet  Self No Yes Sig: Take 1 Tab by mouth nightly. metoprolol tartrate (LOPRESSOR) 25 mg tablet  Self Yes Yes Sig: Take 25 mg by mouth two (2) times a day. nitroglycerin (NITROSTAT) 0.4 mg SL tablet  Self No Yes Si Tab by SubLINGual route every five (5) minutes as needed for Chest Pain (call 911 if not relieved by 3). nystatin (MYCOSTATIN) powder  Self Yes Yes Sig: Apply  to affected area two (2) times a day. nystatin (MYCOSTATIN) topical cream  Self Yes Yes Sig: Apply  to affected area two (2) times a day. Facility-Administered Medications: None Thank you, Daniel David, PharmD. Candidate 2019

## 2019-04-05 NOTE — ED NOTES
TRANSFER - IN REPORT: 
 
Verbal report received from Jaspreet Smith RN(name) on [employer]  being received from Yoana Ellis RN(unit) for routine progression of care Report consisted of patients Situation, Background, Assessment and  
Recommendations(SBAR). Information from the following report(s) SBAR, Kardex, ED Summary, [facility] and Recent Results was reviewed with the receiving nurse. Opportunity for questions and clarification was provided. Assessment completed upon patients arrival to unit and care assumed.

## 2019-04-05 NOTE — H&P
HISTORY AND PHYSICAL 
 
 
PCP: None History source: the patient, ER 
 
 
CC: shortness of breath HPI: 57 y.o lady w/ HTN, COPD chronic hypoxic respiratory failure on 5L O2, GEN/OHS, STEMI, lymphedema, medical non-compliance, who presents with dyspnea. She reports progressive acute-on-chronic dyspnea for the past 4 days, associated with left-sided chest pressure at times. No fever, chronic cough unchanged, no exacerbating or alleviating factors. She hasn't been using her BiPAP regularly, first citing that it broke then stating she can't tolerate it. PMH/PSH: 
Past Medical History:  
Diagnosis Date  Arthritis  Asthma  Breast lump  CAD (coronary artery disease)  [facility]est[facility]  COPD (chronic obstructive [facility] disease) [facility]  Diabetes ([facility]  Hypertension  Morbid obesity with BMI of 70 and over, adult ([facility]  NSTEMI (non-ST elevated myocardial infarction) ([facility]  SVT (supraventricular tachycardia) ([facility]) Past Surgical History:  
Procedure Laterality Date  CARDIAC SURG PROCEDURE UNLIST Stents  HX  SECTION    
 HX ORTHOPAEDIC    
 HX OTHER SURGICAL    
 cyst removed from back Home meds:  
Prior to Admission medications Medication Sig Start Date End Date Taking? Authorizing Provider  
cyclobenzaprine (FLEXERIL) 5 mg tablet Take 1 Tab by mouth two (2) times daily as needed for Muscle Spasm(s). flexeriol 3/17/19   Astrid Almazan NP  
albuterol-ipratropium (DUO-NEB) 2.5 mg-0.5 mg/3 ml nebu 3 mL by Nebulization route four (4) times daily. 3/3/19   Mannie Chacko MD  
furosemide (LASIX) 40 mg tablet Take 40 mg by mouth two (2) times a day. Landon Raza MD  
nystatin (MYCOSTATIN) topical cream Apply  to affected area three (3) times daily as needed for Skin Irritation. Landon Raza MD  
nystatin (MYCOSTATIN) powder Apply  to affected area three (3) times daily as needed (skin irratation).     Landon Raza MD  
 fluticasone (FLONASE) 50 mcg/actuation nasal spray 2 Sprays by Both Nostrils route daily. 1/6/19   Isha Tripp MD  
insulin glargine (LANTUS) 100 unit/mL injection 40 Units by SubCUTAneous route daily. Provider, Historical  
metoprolol tartrate (LOPRESSOR) 25 mg tablet Take 25 mg by mouth two (2) times a day. Provider, Historical  
fluticasone-salmeterol (ADVAIR DISKUS) 500-50 mcg/dose diskus inhaler Take 1 Puff by inhalation every twelve (12) hours. Provider, Historical  
loratadine (CLARITIN) 10 mg tablet Take 10 mg by mouth daily. Provider, Historical  
albuterol (VENTOLIN HFA) 90 mcg/actuation inhaler Take 2 Puffs by inhalation every six (6) hours as needed for Wheezing. Provider, Historical  
aspirin 81 mg chewable tablet Take 81 mg by mouth daily. Provider, Historical  
hydrOXYzine pamoate (VISTARIL) 50 mg capsule Take 50 mg by mouth every eight (8) hours as needed for Itching. Provider, Historical  
lovastatin (MEVACOR) 40 mg tablet Take 1 Tab by mouth nightly. 1/14/16   Chantell Barfield NP  
glyBURIDE (DIABETA) 5 mg tablet Take 5 mg by mouth two (2) times daily (with meals). Provider, Historical  
ammonium lactate (LAC-HYDRIN) 12 % topical cream rub in to affected area well 11/21/14   Bárbara Song MD  
nitroglycerin (NITROSTAT) 0.4 mg SL tablet 1 Tab by SubLINGual route every five (5) minutes as needed for Chest Pain (call 911 if not relieved by 3). 9/12/13   Chantell Barfield NP Allergies: 
No Known Allergies FH: 
Family History Problem Relation Age of Onset  Heart Disease Mother  Heart Disease Father  Heart Disease Sister  Breast Cancer Maternal Grandmother  Breast Cancer Paternal Grandmother SH: Social History Tobacco Use  Smoking status: Former Smoker Packs/day: 0.25 Types: Cigarettes  Smokeless tobacco: Never Used  Tobacco comment: Patient states \"I  aint smoking no more d*mn cigarettes after yesterday\" 01/26/2018 Substance Use Topics  Alcohol use: No  
 
 
ROS: A comprehensive review of systems was negative except for that written in the HPI. PHYSICAL EXAM: 
Visit Vitals /77 Pulse 85 Temp 98.5 °F (36.9 °C) Resp 22 Ht 5' 6\" (1.676 m) SpO2 95% BMI 66.50 kg/m² Gen: NAD, non-toxic HEENT: anicteric sclerae, normal conjunctiva Neck: supple, thick neck limits further assessment Heart: RRR, distant heart sounds Lungs: CTA b/l, non-labored respirations, severely limited exam due to body habitus Abd: soft Extr: lymphedema of lower extremities, lichenified Neuro: CN II-XII grossly intact, normal speech Psych: normal mood, appropriate affect Labs/Imaging: 
Recent Results (from the past 24 hour(s)) EKG, 12 LEAD, INITIAL Collection Time: 04/04/19  9:00 PM  
Result Value Ref Range Ventricular Rate 84 BPM  
 Atrial Rate 84 BPM  
 P-R Interval 186 ms QRS Duration 82 ms Q-T Interval 372 ms QTC Calculation (Bezet) 439 ms Calculated P Axis 56 degrees Calculated R Axis 93 degrees Calculated T Axis 54 degrees Diagnosis Sinus rhythm with occasional premature ventricular complexes Rightward axis When compared with ECG of 24-MAR-2019 20:56, No significant change was found POC G3 - PUL Collection Time: 04/04/19  9:20 PM  
Result Value Ref Range pH (POC) 7.313 (L) 7.35 - 7.45    
 pCO2 (POC) 73.6 (H) 35.0 - 45.0 MMHG  
 pO2 (POC) 74 (L) 80 - 100 MMHG  
 HCO3 (POC) 37.3 (H) 22 - 26 MMOL/L  
 sO2 (POC) 92 92 - 97 % Base excess (POC) 11 mmol/L Site RIGHT RADIAL Device: NASAL CANNULA Flow rate (POC) 5 L/M Allens test (POC) YES Specimen type (POC) ARTERIAL Total resp. rate 20    
CBC WITH AUTOMATED DIFF Collection Time: 04/04/19  9:31 PM  
Result Value Ref Range WBC 9.4 3.6 - 11.0 K/uL  
 RBC 4.34 3.80 - 5.20 M/uL  
 HGB 12.2 11.5 - 16.0 g/dL HCT 42.0 35.0 - 47.0 %  MCV 96.8 80.0 - 99.0 FL  
 MCH 28.1 26.0 - 34.0 PG  
 MCHC 29.0 (L) 30.0 - 36.5 g/dL  
 RDW 13.2 11.5 - 14.5 % PLATELET 437 118 - 386 K/uL MPV 10.9 8.9 - 12.9 FL  
 NRBC 0.0 0  WBC ABSOLUTE NRBC 0.00 0.00 - 0.01 K/uL NEUTROPHILS 78 (H) 32 - 75 % LYMPHOCYTES 11 (L) 12 - 49 % MONOCYTES 8 5 - 13 % EOSINOPHILS 3 0 - 7 % BASOPHILS 0 0 - 1 % IMMATURE GRANULOCYTES 0 0.0 - 0.5 % ABS. NEUTROPHILS 7.2 1.8 - 8.0 K/UL  
 ABS. LYMPHOCYTES 1.0 0.8 - 3.5 K/UL  
 ABS. MONOCYTES 0.8 0.0 - 1.0 K/UL  
 ABS. EOSINOPHILS 0.2 0.0 - 0.4 K/UL  
 ABS. BASOPHILS 0.0 0.0 - 0.1 K/UL  
 ABS. IMM. GRANS. 0.0 0.00 - 0.04 K/UL  
 DF AUTOMATED METABOLIC PANEL, COMPREHENSIVE Collection Time: 04/04/19  9:31 PM  
Result Value Ref Range Sodium 138 136 - 145 mmol/L Potassium 3.8 3.5 - 5.1 mmol/L Chloride 100 97 - 108 mmol/L  
 CO2 36 (H) 21 - 32 mmol/L Anion gap 2 (L) 5 - 15 mmol/L Glucose 129 (H) 65 - 100 mg/dL BUN 18 6 - 20 MG/DL Creatinine 0.69 0.55 - 1.02 MG/DL  
 BUN/Creatinine ratio 26 (H) 12 - 20 GFR est AA >60 >60 ml/min/1.73m2 GFR est non-AA >60 >60 ml/min/1.73m2 Calcium 10.0 8.5 - 10.1 MG/DL Bilirubin, total 0.3 0.2 - 1.0 MG/DL  
 ALT (SGPT) 16 12 - 78 U/L  
 AST (SGOT) 12 (L) 15 - 37 U/L Alk. phosphatase 97 45 - 117 U/L Protein, total 8.1 6.4 - 8.2 g/dL Albumin 3.0 (L) 3.5 - 5.0 g/dL Globulin 5.1 (H) 2.0 - 4.0 g/dL A-G Ratio 0.6 (L) 1.1 - 2.2    
TROPONIN I Collection Time: 04/04/19  9:31 PM  
Result Value Ref Range Troponin-I, Qt. <0.05 <0.05 ng/mL CK W/ REFLX CKMB Collection Time: 04/04/19  9:31 PM  
Result Value Ref Range CK 28 26 - 192 U/L  
NT-PRO BNP Collection Time: 04/04/19  9:31 PM  
Result Value Ref Range NT pro- (H) <125 PG/ML  
POC LACTIC ACID Collection Time: 04/04/19  9:35 PM  
Result Value Ref Range Lactic Acid (POC) 0.73 0.40 - 2.00 mmol/L Recent Labs 04/04/19 2131 WBC 9.4 HGB 12.2 HCT 42.0  Recent Labs 04/04/19 2131   
K 3.8  CO2 36* BUN 18 CREA 0.69 * CA 10.0 Recent Labs 04/04/19 2131 SGOT 12* ALT 16  
AP 97 TBILI 0.3 TP 8.1 ALB 3.0*  
GLOB 5.1* Recent Labs 04/04/19 2131 TROIQ <0.05 No results for input(s): INR, PTP, APTT in the last 72 hours. No lab exists for component: INREXT No results for input(s): PH, PCO2, PO2 in the last 72 hours. Xr Chest [facility] Result Date: 4/4/2019 IMPRESSION: No acute abnormality. Assessment & Plan:  
 
Acute on chronic hypercapnic and hypoxic respiratory failure: suspect due to non-compliance with NIPPV. She doesn't appear acutely dyspneic, her dyspnea is likely progressive due to her numerous respiratory problems stemming from her obesity, COPD, GEN/OHS 
-BiPAP support 
-no evidence of bronchospasm clinically though exam is limited 
-schedule duonebs 
-no steroids or antibiotics for now Chronic diastolic CHF: 
-continue furosemide, will place her on IV for now. Assessing volume is difficult and NT pro-BNP isn't reliable in obese patients COPD 
 
GEN/OHS Type 2 DM: 
-continue Lantus, SSI/POC checks Chronic lymphedema: lower extremity dopplers requested by the ED due to asymmetric edema; f/u result Super-morbid obesity Medical non-compliance: DVT ppx: sq hepdin Code status: full Disposition: prior living arrangement when ready Signed By: John Cardozo MD   
 April 5, 2019

## 2019-04-05 NOTE — WOUND CARE
Wound Care Consult for patient's legs and elbows. Patient is morbidly obese at 424 lbs and has chronic lymphedema Left leg > then right. Usually she has very hyperketatosis skin on her lower legs but today the right legs looks better than it ever has in the past. The left leg is still slowly weeping and the leg crevices have a fungal odor to them. Treatment today: The legs were cleansed with Warm CHG wipes and allowed to air dry. Patient will need some dressings applied to the leg crevices and these were provided to nursing to apply and with a Tubifast leg sock to hold them in place. Lac Hydrin lotion was ordered for her today.   
Milady Bueno RN, BSN, [employer]

## 2019-04-05 NOTE — CONSULTS
[facility]  Pulmonary, Critical Care, and Sleep Medicine    Initial Patient Consult    Name: Maryam Olmstead MRN: [id number]   : 1970 Hospital: [facility]   Date: 2019        IMPRESSION:   · Chronic respiratory failure  · GEN  · OHS  · Super morbid obesity      RECOMMENDATIONS:   · Home O2  · On trilogy/bipap at home given to her by [facility] that she is not using  · Diurese as tolerated  · Pt needs consideration for weight reduction surgery  · This is not a primary pulmonary problem  · This pt's respiratory problems are all related to her obesity  · No evidence of pneumonia, no evidence of pulmonary edema on cxr  · Needs to follow with [facility] as out pt  · Nothing more to add  · Will sign off     Subjective: This patient has been seen and evaluated at the request of Dr. Adwoa Crews for chronic respiratory failure on the setting of super morbid obesity. Patient is a 57 y.o. female with super morbid obesity, BMI 68. 5! ! Admitted for sob  No cough, no wheezing, no fever  Today in no distress      Past Medical History:   Diagnosis Date    Arthritis     Asthma     Breast lump     CAD (coronary artery disease)     Congestive heart failure (HCC)     COPD (chronic obstructive pulmonary disease) (HCC)     Diabetes [facility]     Hypertension     Morbid obesity with BMI of 70 and over, adult ([facility]     NSTEMI (non-ST elevated myocardial infarction) ([facility]     SVT (supraventricular tachycardia) ([facility]       Past Surgical History:   Procedure Laterality Date    CARDIAC SURG PROCEDURE UNLIST      Stents    HX  SECTION      HX ORTHOPAEDIC      HX OTHER SURGICAL      cyst removed from back      Prior to Admission medications    Medication Sig Start Date End Date Taking? Authorizing Provider   cyclobenzaprine (FLEXERIL) 5 mg tablet Take 1 Tab by mouth two (2) times daily as needed for Muscle Spasm(s).  flexeriol 3/17/19  Yes Astrid Almazan, NP albuterol-ipratropium (DUO-NEB) 2.5 mg-0.5 mg/3 ml nebu 3 mL by Nebulization route four (4) times daily. 3/3/19  Yes Olivier De La Cruz MD   furosemide (LASIX) 40 mg tablet Take 40 mg by mouth two (2) times a day. Yes Other, MD Landon   nystatin (MYCOSTATIN) topical cream Apply  to affected area two (2) times a day. Yes Other, MD Landon   nystatin (MYCOSTATIN) powder Apply  to affected area two (2) times a day. Yes Other, MD Landon   fluticasone (FLONASE) 50 mcg/actuation nasal spray 2 Sprays by Both Nostrils route daily. 1/6/19  Yes Leopold Presser, MD   insulin glargine (LANTUS) 100 unit/mL injection 45 Units by SubCUTAneous route daily. Yes Provider, Historical   metoprolol tartrate (LOPRESSOR) 25 mg tablet Take 25 mg by mouth two (2) times a day. Yes Provider, Historical   fluticasone-salmeterol (ADVAIR DISKUS) 500-50 mcg/dose diskus inhaler Take 1 Puff by inhalation every twelve (12) hours. Yes Provider, Historical   loratadine (CLARITIN) 10 mg tablet Take 10 mg by mouth daily. Yes Provider, Historical   albuterol (VENTOLIN HFA) 90 mcg/actuation inhaler Take 2 Puffs by inhalation every six (6) hours as needed for Wheezing. Yes Provider, Historical   aspirin 81 mg chewable tablet Take 81 mg by mouth daily. Yes Provider, Historical   hydrOXYzine pamoate (VISTARIL) 50 mg capsule Take 50 mg by mouth every eight (8) hours as needed for Itching. Yes Provider, Historical   lovastatin (MEVACOR) 40 mg tablet Take 1 Tab by mouth nightly. 1/14/16  Yes Davina Ybarra NP   glyBURIDE (DIABETA) 5 mg tablet Take 5 mg by mouth two (2) times daily (with meals). Yes Provider, Historical   ammonium lactate (LAC-HYDRIN) 12 % topical cream rub in to affected area well 11/21/14  Yes Sukh Song MD   nitroglycerin (NITROSTAT) 0.4 mg SL tablet 1 Tab by SubLINGual route every five (5) minutes as needed for Chest Pain (call 911 if not relieved by 3).  9/12/13  Yes Davina Ybarra, NP     No Known Allergies Social History     Tobacco Use    Smoking status: Former Smoker     Packs/day: 0.25     Types: Cigarettes    Smokeless tobacco: Never Used    Tobacco comment: Patient states \"I  aint smoking no more d*mn cigarettes after yesterday\" 2018   Substance Use Topics    Alcohol use: No      Family History   Problem Relation Age of Onset    Heart Disease Mother     Heart Disease Father     Heart Disease Sister     Breast Cancer Maternal Grandmother     Breast Cancer Paternal Grandmother         Current Facility-Administered Medications   Medication Dose Route Frequency    aspirin chewable tablet 81 mg  81 mg Oral DAILY    fluticasone-vilanterol (BREO ELLIPTA) 100mcg-25mcg/puff   Inhalation DAILY    insulin glargine (LANTUS) injection 40 Units  40 Units SubCUTAneous DAILY    pravastatin (PRAVACHOL) tablet 40 mg  40 mg Oral QHS    metoprolol tartrate (LOPRESSOR) tablet 25 mg  25 mg Oral BID    sodium chloride (NS) flush 5-40 mL  5-40 mL IntraVENous Q8H    heparin (porcine) injection 5,000 Units  5,000 Units SubCUTAneous Q8H    insulin lispro (HUMALOG) injection   SubCUTAneous AC&HS    furosemide (LASIX) injection 40 mg  40 mg IntraVENous BID    albuterol-ipratropium (DUO-NEB) 2.5 MG-0.5 MG/3 ML  3 mL Nebulization TID RT       Review of Systems:  A comprehensive review of systems was negative except for: Respiratory: positive for dyspnea on exertion    Objective:   Vital Signs:    Visit Vitals  /86   Pulse 97   Temp 98 °F (36.7 °C)   Resp 20   Ht 5' 6\" (1.676 m)   Wt (!) 192.4 kg (424 lb 2.6 oz)   SpO2 93%   BMI 68.46 kg/m²       O2 Device: Nasal cannula   O2 Flow Rate (L/min): 5 l/min   Temp (24hrs), Av °F (36.7 °C), Min:97.6 °F (36.4 °C), Max:98.5 °F (36.9 °C)       Intake/Output:   Last shift:      701 -  1900  In: -   Out:  [Urine:1525]  Last 3 shifts: No intake/output data recorded.     Intake/Output Summary (Last 24 hours) at 2019 1406  Last data filed at 2019 0957  Gross per 24 hour   Intake    Output 1525 ml   Net -1525 ml      Physical Exam:   General:  Alert, cooperative, no distress, appears stated age. Head:  Normocephalic, without obvious abnormality, atraumatic. Eyes:  Conjunctivae/corneas clear. PERRL, EOMs intact. Nose: Nares normal. Septum midline. Mucosa normal. No drainage or sinus tenderness. Throat: Lips, mucosa, and tongue normal. Teeth and gums normal.   Neck: Supple, symmetrical, trachea midline, no adenopathy, thyroid: no enlargment/tenderness/nodules, no carotid bruit and no JVD. Back:   Symmetric, no curvature. ROM normal.   Lungs:   Clear to auscultation bilaterally. NO WHEEZING. NO RALES   Chest wall:  No tenderness or deformity. Heart:  Regular rate and rhythm, S1, S2 normal, no murmur, click, rub or gallop. Abdomen:   Soft, non-tender. Bowel sounds normal. No masses,  No organomegaly. Extremities: Extremities normal, atraumatic, no cyanosis, ++ edema. Pulses: 2+ and symmetric all extremities.    Skin: Skin color, texture, turgor normal. No rashes or lesions   Lymph nodes: Cervical, supraclavicular, and axillary nodes normal.   Neurologic: Grossly nonfocal     Data review:     Recent Results (from the past 24 hour(s))   EKG, 12 LEAD, INITIAL    Collection Time: 04/04/19  9:00 PM   Result Value Ref Range    Ventricular Rate 84 BPM    Atrial Rate 84 BPM    P-R Interval 186 ms    QRS Duration 82 ms    Q-T Interval 372 ms    QTC Calculation (Bezet) 439 ms    Calculated P Axis 56 degrees    Calculated R Axis 93 degrees    Calculated T Axis 54 degrees    Diagnosis       Sinus rhythm with occasional premature ventricular complexes  Rightward axis  When compared with ECG of 24-MAR-2019 20:56,  No significant change was found     POC G3 - PUL    Collection Time: 04/04/19  9:20 PM   Result Value Ref Range    pH (POC) 7.313 (L) 7.35 - 7.45      pCO2 (POC) 73.6 (H) 35.0 - 45.0 MMHG    pO2 (POC) 74 (L) 80 - 100 MMHG    HCO3 (POC) 37.3 (H) 22 - 26 MMOL/L    sO2 (POC) 92 92 - 97 %    Base excess (POC) 11 mmol/L    Site RIGHT RADIAL      Device: NASAL CANNULA      Flow rate (POC) 5 L/M    Allens test (POC) YES      Specimen type (POC) ARTERIAL      Total resp. rate 20     CULTURE, BLOOD, PAIRED    Collection Time: 04/04/19  9:30 PM   Result Value Ref Range    Special Requests: NO SPECIAL REQUESTS      Culture result: NO GROWTH AFTER 9 HOURS     CBC WITH AUTOMATED DIFF    Collection Time: 04/04/19  9:31 PM   Result Value Ref Range    WBC 9.4 3.6 - 11.0 K/uL    RBC 4.34 3.80 - 5.20 M/uL    HGB 12.2 11.5 - 16.0 g/dL    HCT 42.0 35.0 - 47.0 %    MCV 96.8 80.0 - 99.0 FL    MCH 28.1 26.0 - 34.0 PG    MCHC 29.0 (L) 30.0 - 36.5 g/dL    RDW 13.2 11.5 - 14.5 %    PLATELET 545 238 - 573 K/uL    MPV 10.9 8.9 - 12.9 FL    NRBC 0.0 0  WBC    ABSOLUTE NRBC 0.00 0.00 - 0.01 K/uL    NEUTROPHILS 78 (H) 32 - 75 %    LYMPHOCYTES 11 (L) 12 - 49 %    MONOCYTES 8 5 - 13 %    EOSINOPHILS 3 0 - 7 %    BASOPHILS 0 0 - 1 %    IMMATURE GRANULOCYTES 0 0.0 - 0.5 %    ABS. NEUTROPHILS 7.2 1.8 - 8.0 K/UL    ABS. LYMPHOCYTES 1.0 0.8 - 3.5 K/UL    ABS. MONOCYTES 0.8 0.0 - 1.0 K/UL    ABS. EOSINOPHILS 0.2 0.0 - 0.4 K/UL    ABS. BASOPHILS 0.0 0.0 - 0.1 K/UL    ABS. IMM. GRANS. 0.0 0.00 - 0.04 K/UL    DF AUTOMATED     METABOLIC PANEL, COMPREHENSIVE    Collection Time: 04/04/19  9:31 PM   Result Value Ref Range    Sodium 138 136 - 145 mmol/L    Potassium 3.8 3.5 - 5.1 mmol/L    Chloride 100 97 - 108 mmol/L    CO2 36 (H) 21 - 32 mmol/L    Anion gap 2 (L) 5 - 15 mmol/L    Glucose 129 (H) 65 - 100 mg/dL    BUN 18 6 - 20 MG/DL    Creatinine 0.69 0.55 - 1.02 MG/DL    BUN/Creatinine ratio 26 (H) 12 - 20      GFR est AA >60 >60 ml/min/1.73m2    GFR est non-AA >60 >60 ml/min/1.73m2    Calcium 10.0 8.5 - 10.1 MG/DL    Bilirubin, total 0.3 0.2 - 1.0 MG/DL    ALT (SGPT) 16 12 - 78 U/L    AST (SGOT) 12 (L) 15 - 37 U/L    Alk.  phosphatase 97 45 - 117 U/L    Protein, total 8.1 6.4 - 8.2 g/dL    Albumin 3.0 (L) 3.5 - 5.0 g/dL    Globulin 5.1 (H) 2.0 - 4.0 g/dL    A-G Ratio 0.6 (L) 1.1 - 2.2     TROPONIN I    Collection Time: 04/04/19  9:31 PM   Result Value Ref Range    Troponin-I, Qt. <0.05 <0.05 ng/mL   CK W/ REFLX CKMB    Collection Time: 04/04/19  9:31 PM   Result Value Ref Range    CK 28 26 - 192 U/L   NT-PRO BNP    Collection Time: 04/04/19  9:31 PM   Result Value Ref Range    NT pro- (H) <125 PG/ML   POC LACTIC ACID    Collection Time: 04/04/19  9:35 PM   Result Value Ref Range    Lactic Acid (POC) 0.73 0.40 - 2.00 mmol/L   GLUCOSE, POC    Collection Time: 04/05/19  8:18 AM   Result Value Ref Range    Glucose (POC) 267 (H) 65 - 100 mg/dL    Performed by SKY PICHARDO (PCT) CON    GLUCOSE, POC    Collection Time: 04/05/19 11:36 AM   Result Value Ref Range    Glucose (POC) 298 (H) 65 - 100 mg/dL    Performed by Edith Elizabeth        Imaging:  I have personally reviewed the patients radiographs and have reviewed the reports:  CXR: nixon Busch MD

## 2019-04-05 NOTE — PROGRESS NOTES
Patient was discharged in mid March with similar symptoms. She says her shortness of breath has not gotten better since dc and she cannot tolerate her home trilogy machine. She denies any fevers or chills or productive cough. She has had intermittent chest pressure. Although she denies refusing her BiPAP today, there are several notations that she did not want to use it. She reported to me that it is because she took the Lasix and will have to go back and forth to urinate. Patient has been started on IV Lasix for possible congestive heart failure and BiPAP with her home inhalers. She has not yet been started on steroids or antibiotics. Her chest x-ray was clear her troponin was negative and her ultrasound in the left leg did not show any DVT. To me patient appears like she is likely at her baseline but will ask cardiology and pulmonary for their assistance in her care so that we can arrange a safe discharge when ready. We will repeat the troponin now to assure no bump given her ongoing chest discomfort overnight. Will await further recommendations from pulmonary and cardiology. Please see this morning's H&P for further care plan details. Discussed with pulm, await further recs.

## 2019-04-05 NOTE — ED NOTES
Ultrasound tech came to bedside. Pt refusing to lie supine. Per ultrasound tech, test will be inconclusive due to size of pt's leg. Okay to hold off on ultrasound per Dr. Yesi Morales at this time.

## 2019-04-05 NOTE — ED NOTES
Pt arrived via EMS c/o COPD exacerbation. Pt c/o increased pedal edema, chest pain, and SOB onset 1200. Pt wears 5L NC at baseline. Pt has very thickened skin on LLE. Cannot palpate pulses due to thickened, hard skin. Will continue to monitor. Call bell within reach. Dr. Luisito Petit at bedside.

## 2019-04-05 NOTE — PROGRESS NOTES
TRANSFER - IN REPORT: 
 
Verbal report received from Virgen Swift RN(name) on Adalberto Riggs  being received from ED(unit) for routine progression of care Report consisted of patients Situation, Background, Assessment and  
Recommendations(SBAR). Information from the following report(s) SBAR, Kardex, MAR, Recent Results and Cardiac Rhythm NSR was reviewed with the receiving nurse. Opportunity for questions and clarification was provided. Assessment completed upon patients arrival to unit and care assumed. Primary Nurse Romayne Mosses, RN and Davida Garcia RN performed a dual skin assessment on this patient Impairment noted-BLE chronic lymphedema, BUE ?eczema rash on elbows, ? yeast rash on pannus Jeffrey score is 18.

## 2019-04-05 NOTE — ED PROVIDER NOTES
EMERGENCY DEPARTMENT HISTORY AND PHYSICAL EXAM 
 
 
Date: 4/4/2019 Patient Name: Marc Grady Patient Age and Sex: 57 y.o. female History of Presenting Illness Chief Complaint Patient presents with  Respiratory Distress History Provided By: Patient and EMS 
 
HPI: Marc Grady, 57 y.o. 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 Patient reports the pain is pleuritic in nature and is consistent with her COPD attacks. She reports cough that is productive. She reports no associated fevers or chills. Patient states she was seen here on March 24 for the similar symptoms but discharged home. She reports many challenges secondary to not having insurance. Patient states she when she was last seen she was not discharged on any antibiotics or steroids. Patient states she was given a prescription of Percocet which did not seem to help with her symptoms. Patient also reports concerns for increased left leg pain and swelling and concern for infection. She is unable to tell me if she has a history of DVT or not. History is limited due to respiratory distress and acuity of condition. PCP: None. Past History Past Medical History: 
Past Medical History:  
Diagnosis Date  Arthritis  Asthma  Breast lump  CAD (coronary artery disease)  Congestive heart failure ([facility]  COPD (chronic obstructive pulmonary disease) ([facility])  Diabetes ([facility]  Hypertension  Morbid obesity with BMI of 70 and over, adult ([facility]  NSTEMI (non-ST elevated myocardial infarction) ([facility]  SVT (supraventricular tachycardia) ([facility]) Past Surgical History: 
Past Surgical History:  
Procedure Laterality Date  CARDIAC SURG PROCEDURE UNLIST Stents  HX  SECTION    
 HX ORTHOPAEDIC    
 HX OTHER SURGICAL    
 cyst removed from back Family History: 
Family History Problem Relation Age of Onset  Heart Disease Mother  Heart Disease Father  Heart Disease Sister  Breast Cancer Maternal Grandmother  Breast Cancer Paternal Grandmother Social History: 
Social History Tobacco Use  Smoking status: Former Smoker Packs/day: 0.25 Types: Cigarettes  Smokeless tobacco: Never Used  Tobacco comment: Patient states \"I  aint smoking no more d*mn cigarettes after yesterday\" 2018 Substance Use Topics  Alcohol use: No  
 Drug use: No  
 
 
Allergies: 
No Known Allergies Medications: No current facility-administered medications on file prior to encounter. Current Outpatient Medications on File Prior to Encounter Medication Sig Dispense Refill  cyclobenzaprine (FLEXERIL) 5 mg tablet Take 1 Tab by mouth two (2) times daily as needed for Muscle Spasm(s). flexeriol 5 Tab 0  
 albuterol-ipratropium (DUO-NEB) 2.5 mg-0.5 mg/3 ml nebu 3 mL by Nebulization route four (4) times daily. 100 Nebule 1  
 furosemide (LASIX) 40 mg tablet Take 40 mg by mouth two (2) times a day.  nystatin (MYCOSTATIN) topical cream Apply  to affected area two (2) times a day.  nystatin (MYCOSTATIN) powder Apply  to affected area two (2) times a day.  fluticasone (FLONASE) 50 mcg/actuation nasal spray 2 Sprays by Both Nostrils route daily. 1 Bottle 0  
 insulin glargine (LANTUS) 100 unit/mL injection 45 Units by SubCUTAneous route daily.  metoprolol tartrate (LOPRESSOR) 25 mg tablet Take 25 mg by mouth two (2) times a day.  fluticasone-salmeterol (ADVAIR DISKUS) 500-50 mcg/dose diskus inhaler Take 1 Puff by inhalation every twelve (12) hours.  loratadine (CLARITIN) 10 mg tablet Take 10 mg by mouth daily.     
 albuterol (VENTOLIN HFA) 90 mcg/actuation inhaler Take 2 Puffs by inhalation every six (6) hours as needed for Wheezing.  aspirin 81 mg chewable tablet Take 81 mg by mouth daily.  hydrOXYzine pamoate (VISTARIL) 50 mg capsule Take 50 mg by mouth every eight (8) hours as needed for Itching.  lovastatin (MEVACOR) 40 mg tablet Take 1 Tab by mouth nightly. 30 Tab 6  
 glyBURIDE (DIABETA) 5 mg tablet Take 5 mg by mouth two (2) times daily (with meals).  ammonium lactate (LAC-HYDRIN) 12 % topical cream rub in to affected area well 280 g 1  
 nitroglycerin (NITROSTAT) 0.4 mg SL tablet 1 Tab by SubLINGual route every five (5) minutes as needed for Chest Pain (call 911 if not relieved by 3). 25 Tab 2 Review of Systems Review of Systems Unable to perform ROS: Severe respiratory distress Physical Exam  
Physical Exam  
Constitutional: She is oriented to person, place, and time. She appears toxic. She has a sickly appearance. She appears ill. She appears distressed. HENT:  
Head: Normocephalic and atraumatic. Mouth/Throat: Oropharynx is clear and moist. No oropharyngeal exudate. Eyes: Pupils are equal, round, and reactive to light. Conjunctivae and EOM are normal.  
Neck: Normal range of motion. Cardiovascular: Normal rate, regular rhythm and normal heart sounds. Exam reveals no gallop and no friction rub. No murmur heard. Pulmonary/Chest: Accessory muscle usage present. Tachypnea noted. She is in respiratory distress. She has wheezes. She has no rales. She exhibits no tenderness. Abdominal: Soft. Bowel sounds are normal. She exhibits no distension and no mass. There is no tenderness. There is no rebound and no guarding. Musculoskeletal: Normal range of motion. She exhibits no edema, tenderness or deformity. Neurological: She is alert and oriented to person, place, and time. She displays normal reflexes. No cranial nerve deficit. She exhibits normal muscle tone.  Coordination normal.  
 Skin: Skin is warm. No rash noted. She is diaphoretic. Psychiatric: She has a normal mood and affect. Nursing note and vitals reviewed. Diagnostic Study Results Labs - Recent Results (from the past 24 hour(s)) GLUCOSE, POC Collection Time: 04/05/19  8:18 AM  
Result Value Ref Range Glucose (POC) 267 (H) 65 - 100 mg/dL Performed by SKY PICHARDO (PCT) RAJINDER   
GLUCOSE, POC Collection Time: 04/05/19 11:36 AM  
Result Value Ref Range Glucose (POC) 298 (H) 65 - 100 mg/dL Performed by Amanda Nicholas TROPONIN I Collection Time: 04/05/19  1:47 PM  
Result Value Ref Range Troponin-I, Qt. <0.05 <0.05 ng/mL GLUCOSE, POC Collection Time: 04/05/19  4:27 PM  
Result Value Ref Range Glucose (POC) 245 (H) 65 - 100 mg/dL Performed by SKY PICHARDO (PCT) RAJINDER   
GLUCOSE, POC Collection Time: 04/05/19  9:09 PM  
Result Value Ref Range Glucose (POC) 224 (H) 65 - 100 mg/dL Performed by Krystina Pham Radiologic Studies -  
XR CHEST PORT Final Result IMPRESSION: No acute abnormality. CT Results  (Last 48 hours) None CXR Results  (Last 48 hours) 04/04/19 2123  XR CHEST PORT Final result Impression:  IMPRESSION: No acute abnormality. Narrative:  EXAM:  XR CHEST PORT. INDICATION: SOB. COMPARISON: 3/24/2019. FINDINGS:   
A portable AP radiograph of the chest was obtained at 2111 hours. The  
positioning is lordotic. Lines and tubes: None. Lungs: The lungs are clear. Pleura: There is no pneumothorax or pleural effusion. Mediastinum: The cardiac and mediastinal contours and pulmonary vascularity are  
normal.  
Bones and soft tissues: The bones and soft tissues are grossly within normal  
limits. Medical Decision Making I am the first provider for this patient.  
 
I reviewed the vital signs, available nursing notes, past medical history, past surgical history, family history and social history. Vital Signs-Reviewed the patient's vital signs. Patient Vitals for the past 24 hrs: 
 Temp Pulse Resp BP SpO2  
04/06/19 0106     96 % 04/05/19 2323 98 °F (36.7 °C) 84 18 122/58 93 % 04/05/19 1933 98.1 °F (36.7 °C) 87 18 102/60 100 % 04/05/19 1932     98 % 04/05/19 1748  98  109/88   
04/05/19 1622 98 °F (36.7 °C) 93 18 116/57 98 % 04/05/19 1417     94 % 04/05/19 1332  97  130/86   
04/05/19 1046 98 °F (36.7 °C) (!) 107 20 150/83 93 % 04/05/19 0847  (!) 114  (!) 141/97   
04/05/19 0813     92 % 04/05/19 0719 97.8 °F (36.6 °C) (!) 102 20 (!) 112/91 95 % 04/05/19 0459 97.6 °F (36.4 °C) 97 24 150/90 95 % 04/05/19 0400    (!) 134/95 90 % 04/05/19 0330     93 % Pulse Oximetry Analysis - 93% on 6L NC Cardiac Monitor:  
Rate: 97 bpm 
Rhythm: Normal Sinus Rhythm ED EKG interpretation: 
Rhythm: normal sinus rhythm and PVC's and regular . Rate (approx.): 84; Axis: normal; P wave: normal; QRS interval: normal ; ST/T wave: normal; Other findings: borderline ekg. This EKG was interpreted by Kei Whitehead M.D. Records Reviewed: Nursing Notes, Old Medical Records, Previous electrocardiograms, Previous Radiology Studies and Previous Laboratory Studies Provider Notes (Medical Decision Making):  
Patient presents with acute dyspnea. DDx: asthma, copd, pna, pulmonary edema, acute bronchitis, ACS, ptx, pna. Will obtain EKG, labs, CXR, provide O2 as needed for hypoxia, treat symptomatically and reassess. Will continue to monitor closely in ED. ED Course:  
Initial assessment performed. The patients presenting problems have been discussed, and they are in agreement with the care plan formulated and outlined with them. I have encouraged them to ask questions as they arise throughout their visit. HYPERTENSION COUNSELING Education was provided to the patient today regarding their hypertension. Patient is made aware of their elevated blood pressure and is instructed to follow up this week with their Primary Care for a recheck. Patient is counseled regarding consequences of chronic, uncontrolled hypertension including kidney disease, heart disease, stroke or even death. Patient states their understanding and agrees to follow up this week. Additionally, during their visit, I discussed sodium restriction, maintaining ideal body weight and regular exercise program as physiologic means to achieve blood pressure control. The patient will strive towards this. I reviewed our electronic medical record system for any past medical records that were available that may contribute to the patient's current condition, the nursing notes and vital signs from today's visit. Ankush Bearden MD 
Medications Administered During ED Course: 
Medications  
aspirin chewable tablet 81 mg (81 mg Oral Given 4/5/19 0847)  
fluticasone-vilanterol (BREO ELLIPTA) 100mcg-25mcg/puff ( Inhalation Given 4/5/19 0848) insulin glargine (LANTUS) injection 40 Units (40 Units SubCUTAneous Given 4/5/19 0848) pravastatin (PRAVACHOL) tablet 40 mg (40 mg Oral Given 4/5/19 2129)  
metoprolol tartrate (LOPRESSOR) tablet 25 mg (25 mg Oral Given 4/5/19 1749)  
sodium chloride (NS) flush 5-40 mL (10 mL IntraVENous Given 4/5/19 2130)  
sodium chloride (NS) flush 5-40 mL (has no administration in time range)  
acetaminophen (TYLENOL) tablet 650 mg (has no administration in time range)  
ondansetron (ZOFRAN) injection 4 mg (has no administration in time range)  
heparin (porcine) injection 5,000 Units (5,000 Units SubCUTAneous Given 4/5/19 2129)  
albuterol (PROVENTIL VENTOLIN) nebulizer solution 2.5 mg (has no administration in time range)  
insulin lispro (HUMALOG) injection (2 Units SubCUTAneous Given 4/5/19 2129)  
glucose chewable tablet 16 g (has no administration in time range) dextrose (D50) infusion 12.5-25 g (has no administration in time range) glucagon (GLUCAGEN) injection 1 mg (has no administration in time range)  
furosemide (LASIX) injection 40 mg (40 mg IntraVENous Given 4/5/19 1800)  
albuterol-ipratropium (DUO-NEB) 2.5 MG-0.5 MG/3 ML (3 mL Nebulization Given 4/5/19 1932) cyclobenzaprine (FLEXERIL) tablet 5 mg (5 mg Oral Given 4/5/19 2129)  
albuterol-ipratropium (DUO-NEB) 2.5 MG-0.5 MG/3 ML (3 mL Nebulization Given 4/4/19 2141) methylPREDNISolone (PF) (Solu-MEDROL) injection 125 mg (125 mg IntraVENous Given 4/4/19 2140) oxyCODONE IR (ROXICODONE) tablet 5 mg (5 mg Oral Given 4/4/19 2240) Consult Note: 
12:00 Amgerson Rodriguez MD spoke with Dr. Wan Bose, Specialty: Hospitalist 
Discussed pt's hx, disposition, and available diagnostic and imaging results. Reviewed care plans. Agree with management and plan thus far. Consultant will evaluate pt for admission. Critical Care Time: CRITICAL CARE NOTE : 
 
2:08 AM 
 
IMPENDING DETERIORATION -Airway, Respiratory, Cardiovascular, CNS, Metabolic and Renal 
ASSOCIATED RISK FACTORS - Hypotension, Shock, Hypoxia, Dysrhythmia and Vascular Compromise MANAGEMENT- Bedside Assessment and Supervision of Care INTERPRETATION -  Xrays, Blood Gases, ECG, Blood Pressure and Cardiac Output Measures INTERVENTIONS - hemodynamic mngmt, vent mngmt and Metobolic interventions CASE REVIEW - Hospitalist, Nursing and Family TREATMENT RESPONSE -Improved PERFORMED BY - Self NOTES   : 
 
I have spent 60 minutes of critical care time involved in lab review, consultations with specialist, family decision- making, bedside attention and documentation. During this entire length of time I was immediately available to the patient . Critical Care:   The reason for providing this level of medical care for this critically ill patient was due to a critical illness that impaired one or more vital organ systems, such that there was a high probability of imminent or life threatening deterioration in the patient's condition. This care involved high complexity decision making to assess, manipulate, and support vital system functions, to treat this degree of vital organ system failure, and to prevent further life threatening deterioration of the patients condition. Keegan Roa MD 
 
Disposition:  ADMIT Patient is being admitted to the hospital.  The results of their tests and reasons for their admission have been discussed with them and/or available family. They convey agreement and understanding for the need to be admitted and for their admission diagnosis. Consultation has been made with the inpatient physician specialist for hospitalization. Diagnosis Clinical Impression: 1. Acute exacerbation of chronic obstructive pulmonary disease (COPD) ([address] 2. Acute on chronic respiratory failure with hypoxia and hypercapnia (HCC) 3. Morbid obesity ([address] 4. Lymphedema of both lower extremities 5. History of medication noncompliance Attestation: 
 
I personally performed the services described in this documentation on this date 4/4/2019 for patient Humaira Rivera. I have reviewed and verified that the information is accurate and complete. Keegan Roa MD 
 
Please note that this dictation was completed with BUILD, the computer voice recognition software. Quite often unanticipated grammatical, syntax, homophones, and other interpretive errors are inadvertently transcribed by the computer software. Please disregard these errors. Please excuse any errors that have escaped final proofreading. Thank you. This note will not be viewable in [address]

## 2019-04-05 NOTE — PROGRESS NOTES
ADULT PROTOCOL: JET AEROSOL ASSESSMENT Patient  Júnior Villalobos     57 y.o.   female     4/5/2019  10:41 AM 
 
Breath Sounds Pre Procedure:   Diminished Breath Sounds Post Procedure:  Diminished Breathing pattern: Pre procedure Breathing Pattern: Regular Post procedure Breathing Pattern: Regular Heart Rate: Pre procedure Pulse: 105 Post procedure Pulse: 101 Resp Rate: Pre procedure Respirations: 20 
         Post procedure Respirations: 20 
 
 
Oxygen: O2 Device: Nasal cannula   5L SpO2: Pre procedure SpO2: 92 % Post procedure SpO2: 94 % Nebulizer Therapy: Current medications Aerosolized Medications: DuoNeb Smoking History: Former Problem List:  
Patient Active Problem List  
Diagnosis Code  Malignant essential hypertension I10  
 Asthma J45.909  Obesity hypoventilation syndrome ([facility]) E66.2  Dyspnea R06.00  Chest pressure R07.89  
 Acute on chronic diastolic heart failure ([facility]) I50.33  
 NSTEMI (non-ST elevated myocardial infarction) ([facility]) I21.4  
 S/P PTCA (percutaneous transluminal coronary angioplasty) Z98.61  
 Coronary atherosclerosis of native coronary artery I25.10  Hypoxia R09.02  
 Noncompliance with therapeutic plan Z91.11  
 Chest pain R07.9  GERD (gastroesophageal reflux disease) K21.9  Acute respiratory failure with hypoxia and hypercarbia ([facility]) J96.01, J96.02  
 COPD (chronic obstructive pulmonary disease) ([facility]) J44.9  Tobacco abuse Z72.0  Obesity, morbid (more than 100 lbs over ideal weight or BMI > 40) ([facility]) E66.01  
 Cellulitis L03.90  
 SIRS due to infectious process with acute organ dysfunction ([facility]) A41.9, R65.20  Sepsis associated hypotension ([facility]) A41.9, I95.9  Gangrenous toe ([facility] [id number]  Type II diabetes mellitus, uncontrolled ([facility]) E11.65  
 HTN (hypertension) Z21  
 Diastolic CHF, chronic ([facility]) I50.32  
  Cough with hemoptysis R04.2  Lymphedema of both lower extremities I89.0  
 CAP (community acquired pneumonia) J18.9  Cellulitis, leg L03.119  
 COPD exacerbation (HCC) J44.1  Maggot infestation B87.9  Shortness of breath R06.02  
 Multifocal pneumonia J18.9  Acute respiratory failure (HCC) J96.00  
 Acute on chronic respiratory failure with hypoxia and hypercapnia (HCC) J96.21, J96.22  
 NSVT (nonsustained ventricular tachycardia) ([facility]) I47.2  Respiratory failure ([facility] J96.90  Elevated lipase R74.8  Abdominal pain R10.9  Counseling regarding goals of care Z71.89  
 Acute pancreatitis K85.90  Breast cancer ([facility] C50.919  Intertrigo L30.4  PNA (pneumonia) J18.9  Productive cough R05  
 Other fatigue R53.83  
 Acute chest pain R07.9  Acute on chronic respiratory failure with hypoxia ([facility]) J96.21  
 Acute on chronic respiratory failure with hypercapnia ([facility]) J96.22 Respiratory Therapist: Evette Kowalski V RT

## 2019-04-05 NOTE — ED NOTES
Bedside shift change report given to Telly Montoya RN (oncoming nurse) by Melvina Gonzalez RN (offgoing nurse). Report included the following information SBAR, Kardex, ED Summary, Intake/Output, MAR and Recent Results.

## 2019-04-06 LAB
ANION GAP SERPL CALC-SCNC: 1 MMOL/L (ref 5–15)
BUN SERPL-MCNC: 20 MG/DL (ref 6–20)
BUN/CREAT SERPL: 27 (ref 12–20)
CALCIUM SERPL-MCNC: 10.3 MG/DL (ref 8.5–10.1)
CHLORIDE SERPL-SCNC: 99 MMOL/L (ref 97–108)
CO2 SERPL-SCNC: 39 MMOL/L (ref 21–32)
CREAT SERPL-MCNC: 0.73 MG/DL (ref 0.55–1.02)
GLUCOSE BLD STRIP.AUTO-MCNC: 152 MG/DL (ref 65–100)
GLUCOSE BLD STRIP.AUTO-MCNC: 171 MG/DL (ref 65–100)
GLUCOSE BLD STRIP.AUTO-MCNC: 194 MG/DL (ref 65–100)
GLUCOSE BLD STRIP.AUTO-MCNC: 263 MG/DL (ref 65–100)
GLUCOSE SERPL-MCNC: 194 MG/DL (ref 65–100)
MAGNESIUM SERPL-MCNC: 2 MG/DL (ref 1.6–2.4)
PHOSPHATE SERPL-MCNC: 2 MG/DL (ref 2.6–4.7)
POTASSIUM SERPL-SCNC: 3.7 MMOL/L (ref 3.5–5.1)
SERVICE CMNT-IMP: ABNORMAL
SODIUM SERPL-SCNC: 139 MMOL/L (ref 136–145)

## 2019-04-06 PROCEDURE — 77010033678 HC OXYGEN DAILY

## 2019-04-06 PROCEDURE — 74011250636 HC RX REV CODE- 250/636: Performed by: HOSPITALIST

## 2019-04-06 PROCEDURE — 74011636637 HC RX REV CODE- 636/637: Performed by: HOSPITALIST

## 2019-04-06 PROCEDURE — 74011250637 HC RX REV CODE- 250/637: Performed by: EMERGENCY MEDICINE

## 2019-04-06 PROCEDURE — 82962 GLUCOSE BLOOD TEST: CPT

## 2019-04-06 PROCEDURE — 94660 CPAP INITIATION&MGMT: CPT

## 2019-04-06 PROCEDURE — 84100 ASSAY OF PHOSPHORUS: CPT

## 2019-04-06 PROCEDURE — 83735 ASSAY OF MAGNESIUM: CPT

## 2019-04-06 PROCEDURE — 74011000250 HC RX REV CODE- 250: Performed by: HOSPITALIST

## 2019-04-06 PROCEDURE — 80048 BASIC METABOLIC PNL TOTAL CA: CPT

## 2019-04-06 PROCEDURE — 94640 AIRWAY INHALATION TREATMENT: CPT

## 2019-04-06 PROCEDURE — 36415 COLL VENOUS BLD VENIPUNCTURE: CPT

## 2019-04-06 PROCEDURE — 74011250637 HC RX REV CODE- 250/637: Performed by: HOSPITALIST

## 2019-04-06 PROCEDURE — 65660000000 HC RM CCU STEPDOWN

## 2019-04-06 RX ORDER — FUROSEMIDE 40 MG/1
40 TABLET ORAL 2 TIMES DAILY
Status: DISCONTINUED | OUTPATIENT
Start: 2019-04-06 | End: 2019-04-08 | Stop reason: HOSPADM

## 2019-04-06 RX ADMIN — METOPROLOL TARTRATE 25 MG: 25 TABLET ORAL at 17:53

## 2019-04-06 RX ADMIN — IPRATROPIUM BROMIDE AND ALBUTEROL SULFATE 3 ML: .5; 3 SOLUTION RESPIRATORY (INHALATION) at 14:30

## 2019-04-06 RX ADMIN — HEPARIN SODIUM 5000 UNITS: 5000 INJECTION INTRAVENOUS; SUBCUTANEOUS at 21:02

## 2019-04-06 RX ADMIN — CYCLOBENZAPRINE HYDROCHLORIDE 5 MG: 10 TABLET, FILM COATED ORAL at 17:00

## 2019-04-06 RX ADMIN — INSULIN LISPRO 3 UNITS: 100 INJECTION, SOLUTION INTRAVENOUS; SUBCUTANEOUS at 22:00

## 2019-04-06 RX ADMIN — INSULIN GLARGINE 40 UNITS: 100 INJECTION, SOLUTION SUBCUTANEOUS at 08:50

## 2019-04-06 RX ADMIN — IPRATROPIUM BROMIDE AND ALBUTEROL SULFATE 3 ML: .5; 3 SOLUTION RESPIRATORY (INHALATION) at 07:35

## 2019-04-06 RX ADMIN — INSULIN LISPRO 2 UNITS: 100 INJECTION, SOLUTION INTRAVENOUS; SUBCUTANEOUS at 12:23

## 2019-04-06 RX ADMIN — PRAVASTATIN SODIUM 40 MG: 40 TABLET ORAL at 21:06

## 2019-04-06 RX ADMIN — HEPARIN SODIUM 5000 UNITS: 5000 INJECTION INTRAVENOUS; SUBCUTANEOUS at 03:47

## 2019-04-06 RX ADMIN — Medication 10 ML: at 06:00

## 2019-04-06 RX ADMIN — INSULIN LISPRO 2 UNITS: 100 INJECTION, SOLUTION INTRAVENOUS; SUBCUTANEOUS at 17:56

## 2019-04-06 RX ADMIN — FUROSEMIDE 40 MG: 40 TABLET ORAL at 17:53

## 2019-04-06 RX ADMIN — Medication 10 ML: at 21:06

## 2019-04-06 RX ADMIN — METOPROLOL TARTRATE 25 MG: 25 TABLET ORAL at 08:50

## 2019-04-06 RX ADMIN — ASPIRIN 81 MG 81 MG: 81 TABLET ORAL at 08:50

## 2019-04-06 RX ADMIN — FLUTICASONE FUROATE AND VILANTEROL TRIFENATATE: 100; 25 POWDER RESPIRATORY (INHALATION) at 09:00

## 2019-04-06 RX ADMIN — HEPARIN SODIUM 5000 UNITS: 5000 INJECTION INTRAVENOUS; SUBCUTANEOUS at 12:24

## 2019-04-06 RX ADMIN — INSULIN LISPRO 2 UNITS: 100 INJECTION, SOLUTION INTRAVENOUS; SUBCUTANEOUS at 08:51

## 2019-04-06 RX ADMIN — FUROSEMIDE 40 MG: 10 INJECTION, SOLUTION INTRAMUSCULAR; INTRAVENOUS at 08:51

## 2019-04-06 RX ADMIN — IPRATROPIUM BROMIDE AND ALBUTEROL SULFATE 3 ML: .5; 3 SOLUTION RESPIRATORY (INHALATION) at 21:46

## 2019-04-06 NOTE — PROGRESS NOTES
PCU SHIFT NURSING NOTE 
 
1900: The bedside  shift change report given to Linsey (oncoming nurse) by Moira Real  (offgoing nurse). Report included the following information SBAR, Kardex, Intake/Output, MAR, Recent Results, Cardiac Rhythm NSR and Alarm Parameters . 10:53 PM 
The patient resting in bed with eyes open. She is not happy with the bariatric bed ordered. She wants it changed. Explained that the wound care recommended and ordered it. She still want the bed changed even to regular bed if no other bed is available now. No other bariatric bed available for her now. PCU incharge aware. . All night meds administered as per schedule. To continue to monitor. The patient refused BIPAP. Now on NC at 5L 
 
6:47 AM 
Morning labs done. No critical's noted. Admission Date 4/4/2019 Admission Diagnosis Acute on chronic respiratory failure with hypercapnia (HCC) [J96.22] Consults IP CONSULT TO HOSPITALIST 
IP CONSULT TO INTENSIVIST 
IP CONSULT TO CARDIOLOGY Consults []PT []OT []Speech  
[]Case Management  
  
[] Palliative Cardiac Monitoring Order  
[x]Yes []No  
 
IV drips []Yes Drip:                            Dose: 
Drip:                            Dose: 
Drip:                            Dose:  
[x]No  
 
GI Prophylaxis []Yes []No  
 
 
 
DVT Prophylaxis SCDs:     
     
 Haroon stockings:     
  
[] Medication []Contraindicated []None Activity Level Activity Level: Up with Assistance Activity Assistance: Partial (one person) Purposeful Rounding every 1-2 hour? []Yes Ogden Score  Total Score: 2 Bed Alarm (If score 3 or >) []Yes  
[] Refused (See signed refusal form in chart) Jeffrey Score  Jeffrey Score: 17 Jeffrey Score (if score 14 or less) []PMT consult  
[]Wound Care consult []Specialty bed  
[] Nutrition consult Needs prior to discharge:  
Home O2 required:   
[x]Yes []No  
 If yes, how much O2 required? Other: Last Bowel Movement: Last Bowel Movement Date: 04/05/19 Influenza Vaccine Received Flu Vaccine for Current Season (usually Sept-March): Yes Pneumonia Vaccine Diet Active Orders Diet DIET DIABETIC WITH OPTIONS Consistent Carb 2400kcal; Regular; AHA-LOW-CHOL FAT  
  
LDAs Peripheral IV 04/05/19 Left Antecubital (Active) Site Assessment Clean, dry, & intact 4/5/2019  7:33 PM  
Phlebitis Assessment 0 4/5/2019  7:33 PM  
Infiltration Assessment 0 4/5/2019  7:33 PM  
Dressing Status Clean, dry, & intact 4/5/2019  7:33 PM  
Dressing Type Tape;Transparent 4/5/2019  7:33 PM  
Hub Color/Line Status Pink 4/5/2019  7:33 PM  
Action Taken Open ports on tubing capped 4/5/2019  7:33 PM  
Alcohol Cap Used Yes 4/5/2019  7:33 PM  
                  
Urinary Catheter Intake & Output Date 04/04/19 1900 - 04/05/19 0659 04/05/19 0700 - 04/06/19 4852 Shift 7502-1060 24 Hour Total 0443-7785 2968-3321 24 Hour Total  
INTAKE  
P.O.   1000  1000  
  P. O.   1000  1000 Shift Total(mL/kg)   1000(5.2)  1000(5.2) OUTPUT Urine(mL/kg/hr)   2350(1) 1200 3550 Urine Voided   2350 1200 3550 Shift Total(mL/kg)   8637(95.2) 3516(2.3) 5184(41.4) NET   -9083 -1200 -2550 Weight (kg) 192.4 192.4 192.4 192.4 192.4 Readmission Risk Assessment Tool Score High Risk   
      
 30 Total Score 3 Has Seen PCP in Last 6 Months (Yes=3, No=0)  
 11 IP Visits Last 12 Months (1-3=4, 4=9, >4=11) 9 Pt. Coverage (Medicare=5 , Medicaid, or Self-Pay=4) 7 Charlson Comorbidity Score (Age + Comorbid Conditions) Criteria that do not apply:  
 . Living with Significant Other. Assisted Living. LTAC. SNF. or  
Rehab Patient Length of Stay (>5 days = 3) Expected Length of Stay 3d 19h Actual Length of Stay 0

## 2019-04-06 NOTE — PROGRESS NOTES
6:08 PM TRANSFER - IN REPORT: 
 
Verbal report received from Otilia(name) on Yahaira Sheridan  being received from PCU(unit) for routine progression of care Report consisted of patients Situation, Background, Assessment and  
Recommendations(SBAR). Information from the following report(s) SBAR, Kardex, MAR, Recent Results and Med Rec Status was reviewed with the receiving nurse. Opportunity for questions and clarification was provided. 6:54 PM Patient arrived via wheelchair to assigned room. Introduced myself as primary nurse. Assumed care of patient. Instructed patient to call prior to getting up out of bed. Pt verbalized understanding. Call bell within reach. Bedside and Verbal shift change report given to [address] [place], [place] [id number]  (oncoming nurse) by [facility] (offgoing nurse). Report included the following information SBAR, Kardex, Intake/Output, Recent Results, Med Rec Status and Cardiac Rhythm NSR.

## 2019-04-06 NOTE — PROGRESS NOTES
Cardiology Progress Note 4/6/2019 10:24 AM 
 
Admit Date: 4/4/2019 Admit Diagnosis: Acute on chronic respiratory failure with hypercapnia (HCC) [J96.22] Subjective:  
 
Maryam Olmstead c/o uncomfortable bed. No chest pain. Breathing better. Visit Vitals /60 Pulse 77 Temp 97.3 °F (36.3 °C) Resp 18 Ht 5' 6\" (1.676 m) Wt (!) 373 lb 11.2 oz (169.5 kg) SpO2 94% BMI 60.32 kg/m² Current Facility-Administered Medications Medication Dose Route Frequency  aspirin chewable tablet 81 mg  81 mg Oral DAILY  fluticasone-vilanterol (BREO ELLIPTA) 100mcg-25mcg/puff   Inhalation DAILY  insulin glargine (LANTUS) injection 40 Units  40 Units SubCUTAneous DAILY  pravastatin (PRAVACHOL) tablet 40 mg  40 mg Oral QHS  metoprolol tartrate (LOPRESSOR) tablet 25 mg  25 mg Oral BID  sodium chloride (NS) flush 5-40 mL  5-40 mL IntraVENous Q8H  
 sodium chloride (NS) flush 5-40 mL  5-40 mL IntraVENous PRN  
 acetaminophen (TYLENOL) tablet 650 mg  650 mg Oral Q4H PRN  
 ondansetron (ZOFRAN) injection 4 mg  4 mg IntraVENous Q4H PRN  
 heparin (porcine) injection 5,000 Units  5,000 Units SubCUTAneous Q8H  
 albuterol (PROVENTIL VENTOLIN) nebulizer solution 2.5 mg  2.5 mg Nebulization Q4H PRN  
 insulin lispro (HUMALOG) injection   SubCUTAneous AC&HS  
 glucose chewable tablet 16 g  4 Tab Oral PRN  
 dextrose (D50) infusion 12.5-25 g  12.5-25 g IntraVENous PRN  
 glucagon (GLUCAGEN) injection 1 mg  1 mg IntraMUSCular PRN  
 furosemide (LASIX) injection 40 mg  40 mg IntraVENous BID  albuterol-ipratropium (DUO-NEB) 2.5 MG-0.5 MG/3 ML  3 mL Nebulization TID RT  
 cyclobenzaprine (FLEXERIL) tablet 5 mg  5 mg Oral BID PRN Objective:  
  
Physical Exam: 
Visit Vitals /60 Pulse 77 Temp 97.3 °F (36.3 °C) Resp 18 Ht 5' 6\" (1.676 m) Wt (!) 373 lb 11.2 oz (169.5 kg) SpO2 94% BMI 60.32 kg/m² General Appearance: Morbidly obese, alert and oriented x 3, and individual in no acute distress. Ears/Nose/Mouth/Throat:   Hearing grossly normal. 
  
    Neck: Supple. Chest:   Lungs clear to auscultation bilaterally. Cardiovascular:  Regular rate and rhythm, S1, S2 normal, no murmur. Abdomen:   Soft, non-tender, bowel sounds are active. Extremities: Chronic edema bilaterally. Skin: Warm and dry. Data Review:  
Labs:   
Recent Results (from the past 24 hour(s)) GLUCOSE, POC Collection Time: 04/05/19 11:36 AM  
Result Value Ref Range Glucose (POC) 298 (H) 65 - 100 mg/dL Performed by Akanksha Thornton TROPONIN I Collection Time: 04/05/19  1:47 PM  
Result Value Ref Range Troponin-I, Qt. <0.05 <0.05 ng/mL GLUCOSE, POC Collection Time: 04/05/19  4:27 PM  
Result Value Ref Range Glucose (POC) 245 (H) 65 - 100 mg/dL Performed by SKY PICHARDO ([facility]) RAJINDER   
GLUCOSE, POC Collection Time: 04/05/19  9:09 PM  
Result Value Ref Range Glucose (POC) 224 (H) 65 - 100 mg/dL Performed by GERS, BASIC Collection Time: 04/06/19  3:50 AM  
Result Value Ref Range Sodium 139 136 - 145 mmol/L Potassium 3.7 3.5 - 5.1 mmol/L Chloride 99 97 - 108 mmol/L  
 CO2 39 (H) 21 - 32 mmol/L Anion gap 1 (L) 5 - 15 mmol/L Glucose 194 (H) 65 - 100 mg/dL BUN 20 6 - 20 MG/DL Creatinine 0.73 0.55 - 1.02 MG/DL  
 BUN/Creatinine ratio 27 (H) 12 - 20 GFR est AA >60 >60 ml/min/1.73m2 GFR est non-AA >60 >60 ml/min/1.73m2 Calcium 10.3 (H) 8.5 - 10.1 MG/DL MAGNESIUM Collection Time: 04/06/19  3:50 AM  
Result Value Ref Range Magnesium 2.0 1.6 - 2.4 mg/dL PHOSPHORUS Collection Time: 04/06/19  3:50 AM  
Result Value Ref Range Phosphorus 2.0 (L) 2.6 - 4.7 MG/DL  
GLUCOSE, POC Collection Time: 04/06/19  7:26 AM  
Result Value Ref Range Glucose (POC) 152 (H) 65 - 100 mg/dL Performed by SKY PICHARDO ([facility]) CON Telemetry: normal sinus rhythm Assessment:  
 
Principal Problem: 
  Acute on chronic respiratory failure with hypercapnia ([address] (4/5/2019) Plan:  
 
Continue current therapy. If she remains pain free, would not pursue cath.  
 
Lino Cortes MD

## 2019-04-06 NOTE — PROGRESS NOTES
ADULT PROTOCOL: JET AEROSOL  REASSESSMENT Patient  Marcio Freitas     57 y.o.   female     4/6/2019  12:42 AM 
 
Breath Sounds Pre Procedure: Right Breath Sounds: Diminished Left Breath Sounds: Diminished Breath Sounds Post Procedure:   
                                 
 
Breathing pattern: Pre procedure Breathing Pattern: Regular Post procedure Breathing Pattern: Regular Heart Rate: Pre procedure Pulse: 91 
         Post procedure Pulse: 105 Resp Rate: Pre procedure Respirations: 19 Post procedure Respirations: 21 Peak Flow: Pre bronchodilator Post bronchodilator Incentive Spirometry:    
     
 
Cough: Pre procedure Post procedure Cough: Non-productive Suctioned: NO Sputum: Pre procedure Post procedure Oxygen: O2 Device: Nasal cannula   Flow rate (L/min) 5 Changed: NO SpO2: Pre procedure SpO2: 98 %   with oxygen Post procedure SpO2: 97 %  with oxygen Nebulizer Therapy: Current medications Aerosolized Medications: DuoNeb Changed: NO Smoking History: former Problem List:  
Patient Active Problem List  
Diagnosis Code  Malignant essential hypertension I10  
 Asthma J45.909  Obesity hypoventilation syndrome ([facility]) E66.2  Dyspnea R06.00  Chest pressure R07.89  
 Acute on chronic diastolic heart failure ([facility]) I50.33  
 NSTEMI (non-ST elevated myocardial infarction) ([facility]) I21.4  
 S/P PTCA (percutaneous transluminal coronary angioplasty) Z98.61  
 Coronary atherosclerosis of native coronary artery I25.10  Hypoxia R09.02  
 Noncompliance with therapeutic plan Z91.11  
 Chest pain R07.9  GERD (gastroesophageal reflux disease) K21.9  Acute respiratory failure with hypoxia and hypercarbia ([facility]) J96.01, J96.02  
 COPD (chronic obstructive pulmonary disease) ([facility]) J44.9  Tobacco abuse Z72.0  Obesity, morbid (more than 100 lbs over ideal weight or BMI > 40) ([facility]) E66.01  
 Cellulitis L03.90  
 SIRS due to infectious process with acute organ dysfunction ([facility]) A41.9, R65.20  Sepsis associated hypotension ([facility]) A41.9, I95.9  Gangrenous toe ([facility] [id number]  Type II diabetes mellitus, uncontrolled ([facility]) E11.65  
 HTN (hypertension) W32  
 Diastolic CHF, chronic ([facility]) I50.32  
 Cough with hemoptysis R04.2  Lymphedema of both lower extremities I89.0  
 CAP (community acquired pneumonia) J18.9  Cellulitis, leg L03.119  
 COPD exacerbation ([facility]) J44.1  Maggot infestation B87.9  Shortness of breath R06.02  
 Multifocal pneumonia J18.9  Acute respiratory failure ([facility]) J96.00  
 Acute on chronic respiratory failure with hypoxia and hypercapnia ([facility]) J96.21, J96.22  
 NSVT (nonsustained ventricular tachycardia) ([facility]) I47.2  Respiratory failure ([facility] J96.90  Elevated lipase R74.8  Abdominal pain R10.9  Counseling regarding goals of care Z71.89  
 Acute pancreatitis K85.90  Breast cancer ([facility] C50.919  Intertrigo L30.4  PNA (pneumonia) J18.9  Productive cough R05  
 Other fatigue R53.83  
 Acute chest pain R07.9  Acute on chronic respiratory failure with hypoxia ([facility]) J96.21  
 Acute on chronic respiratory failure with hypercapnia ([facility]) J96.22 Respiratory Therapist: RT Timothy

## 2019-04-06 NOTE — PROGRESS NOTES
Reason for Admission: RRAT Score:     30 Resources/supports as identified by patient/family:   Pt has insurance, Pt has personal care aide services Top Challenges facing patient (as identified by patient/family and CM): Finances/Medication cost?      Pt denied finances to be a barrier Transportation? Pt has transportation services via her insurance however reported difficulty utilizing this due to the size of her O2 tanks and limited support. Support system or lack thereof? Pt has a personal care aide for 6 hours per day. Living arrangements? Pt resides alone Self-care/ADLs/Cognition? Pt is ambulatory, however utilizes wheelchair Current Advanced Directive/Advance Care Plan: On file Plan for utilizing home health:    [facility] is recommended, Pt has utilized [facility] and desires to use this in the future. Pt does not have PCP to follow Pt with HH. Likelihood of readmission:  High 
              
Transition of Care Plan:   Home w/HH, set up PCP Pt is a 56 y/o female who was admitted to PCU. CM consulted to assist with setting up [facility]. Barrier to set up [facility] is Pt not having a PCP. CM unable to set up [facility] (no PCP to follow) or F/U cardiology due to weekend. CM made MD aware. Pt will stay inpatient and weekday CM will F/U PCP concerns. Kirby Swartz LCSW Ext # [phone]

## 2019-04-06 NOTE — PROGRESS NOTES
Hospitalist Progress Note NAME: Henry Jaimes :  1970 MRN:  [id number] Assessment / Plan: 
 
Acute on chronic hypercapnic and hypoxic respiratory failure: suspect due to non-compliance with NIPPV. She doesn't appear acutely dyspneic, her dyspnea is likely progressive due to her numerous respiratory problems stemming from her obesity, COPD, GEN/OHS 
-BiPAP support 
-no evidence of bronchospasm clinically though exam is limited 
-schedule duonebs 
-no steroids or antibiotics for now 
-appreciate pulm eval, no acute exac/pna 
  
Chronic diastolic CHF Intermittent CP 
-continue furosemide, on IV for now, but cards does not feel acute CHF, will change to po.  
-neg trop x2 
-unable to get echo or stress due to size 
-consider cath if needed to r/o recurrent ischemic dz as cause of resp sxs and cp 
 
  
COPD 
  
GEN/OHS 
  
Type 2 DM: 
-continue Lantus, SSI/POC checks 
  
Chronic lymphedema: 
-wound care 
-no DVT on us on L 
  
Super-morbid obesity BMI 60 
  
Medical non-compliance: 
  
DVT ppx: sq heparin Code status: full Recommended Disposition:  PT, OT, RN Tried to [employer] today with outpt cards f/u once confirmed ok with cards, but pt says she has no pcp and CM can't arrange home health w/o pcp or assistance with transportation on weekend (she says he can't get to card f/u). Will stay until Monday to arrange safe dc. Subjective: Chief Complaint / Reason for Physician Visit 
sob Patient complains that she has pain in her leg from her bed. She denied any other specific complaints says she is still short of breath still with intermittent chest pain. She says she would be agreeable to getting a catheterization of Dr. Ludger Jeans felt she needed it. We discussed the possibility of discharge and follow-up as an outpatient with Dr. Ericka Vail if okay with cardiology. She reports that her oxygen levels keep falling however she is been stable on her home 5 L since admission. Patient was evaluated at 11:30 AM 
 
Discussed with RN events overnight. Review of Systems: 
Symptom Y/N Comments  Symptom Y/N Comments Fever/Chills    Chest Pain y Poor Appetite n   Edema Cough    Abdominal Pain n   
Sputum n   Joint Pain SOB/RAZO y   Pruritis/Rash Nausea/vomit n   Tolerating PT/OT Diarrhea n   Tolerating Diet y Constipation    Other Could NOT obtain due to:   
 
Objective: VITALS:  
Last 24hrs VS reviewed since prior progress note. Most recent are: 
Patient Vitals for the past 24 hrs: 
 Temp Pulse Resp BP SpO2  
04/06/19 1430     96 % 04/06/19 1142 97.3 °F (36.3 °C) 74 18 103/51 95 % 04/06/19 0851  77  111/60   
04/06/19 0847  80  111/60   
04/06/19 0735     94 % 04/06/19 0718 97.3 °F (36.3 °C) 85 18 120/65 93 % 04/06/19 0348 97.4 °F (36.3 °C) 79 18 93/52 96 % 04/06/19 0342     97 % 04/06/19 0106     96 % 04/05/19 2323 98 °F (36.7 °C) 84 18 122/58 93 % 04/05/19 1933 98.1 °F (36.7 °C) 87 18 102/60 100 % 04/05/19 1932     98 % 04/05/19 1748  98  109/88   
04/05/19 1622 98 °F (36.7 °C) 93 18 116/57 98 % Intake/Output Summary (Last 24 hours) at 4/6/2019 1607 Last data filed at 4/6/2019 1022 Gross per 24 hour Intake 1480 ml Output 3325 ml Net -1845 ml PHYSICAL EXAM: 
Patient is awake and alert sitting in a chair on nasal cannula no distress. Conjunctiva pink mucous membranes moist cardiovascular regular rate distant no murmurs rubs or gallops. Lungs decreased breath sounds bilaterally no wheezes rhonchi or crackles. Abdomen is obese bowel sounds present soft nontender. Extremities bilateral lymphedema left greater than right Reviewed most current lab test results and cultures  YES Reviewed most current radiology test results   YES Review and summation of old records today    NO Reviewed patient's current orders and MAR    YES 
PMH/SH reviewed - no change compared to H&P 
 ________________________________________________________________________ Care Plan discussed with: 
  Comments Patient y Family RN y   
Care Manager y Consultant Multidiciplinary team rounds were held today with , nursing, pharmacist and clinical coordinator. Patient's plan of care was discussed; medications were reviewed and discharge planning was addressed. ________________________________________________________________________ Total NON critical care TIME:    Minutes Total CRITICAL CARE TIME Spent:   Minutes non procedure based Comments >50% of visit spent in counseling and coordination of care    
________________________________________________________________________ Sowmya Snider MD  
 
Procedures: see electronic medical records for all procedures/Xrays and details which were not copied into this note but were reviewed prior to creation of Plan. LABS: 
I reviewed today's most current labs and imaging studies. Pertinent labs include: 
Recent Labs 04/04/19 
2131 WBC 9.4 HGB 12.2 HCT 42.0  Recent Labs 04/06/19 
0350 04/04/19 
2131  138  
K 3.7 3.8 CL 99 100 CO2 39* 36* * 129* BUN 20 18 CREA 0.73 0.69  
CA 10.3* 10.0 MG 2.0  --   
PHOS 2.0*  --   
ALB  --  3.0* TBILI  --  0.3 SGOT  --  12* ALT  --  16 Signed: Sowmya Snider MD

## 2019-04-07 LAB
GLUCOSE BLD STRIP.AUTO-MCNC: 129 MG/DL (ref 65–100)
GLUCOSE BLD STRIP.AUTO-MCNC: 157 MG/DL (ref 65–100)
GLUCOSE BLD STRIP.AUTO-MCNC: 182 MG/DL (ref 65–100)
GLUCOSE BLD STRIP.AUTO-MCNC: 204 MG/DL (ref 65–100)
SERVICE CMNT-IMP: ABNORMAL

## 2019-04-07 PROCEDURE — 65660000000 HC RM CCU STEPDOWN

## 2019-04-07 PROCEDURE — 74011000250 HC RX REV CODE- 250: Performed by: HOSPITALIST

## 2019-04-07 PROCEDURE — 77010033678 HC OXYGEN DAILY

## 2019-04-07 PROCEDURE — 74011250636 HC RX REV CODE- 250/636: Performed by: HOSPITALIST

## 2019-04-07 PROCEDURE — 74011250637 HC RX REV CODE- 250/637: Performed by: HOSPITALIST

## 2019-04-07 PROCEDURE — 94640 AIRWAY INHALATION TREATMENT: CPT

## 2019-04-07 PROCEDURE — 82962 GLUCOSE BLOOD TEST: CPT

## 2019-04-07 PROCEDURE — 74011250637 HC RX REV CODE- 250/637: Performed by: EMERGENCY MEDICINE

## 2019-04-07 PROCEDURE — 74011636637 HC RX REV CODE- 636/637: Performed by: HOSPITALIST

## 2019-04-07 PROCEDURE — 94660 CPAP INITIATION&MGMT: CPT

## 2019-04-07 PROCEDURE — 94761 N-INVAS EAR/PLS OXIMETRY MLT: CPT

## 2019-04-07 RX ADMIN — CYCLOBENZAPRINE HYDROCHLORIDE 5 MG: 10 TABLET, FILM COATED ORAL at 09:10

## 2019-04-07 RX ADMIN — METOPROLOL TARTRATE 25 MG: 25 TABLET ORAL at 08:19

## 2019-04-07 RX ADMIN — FUROSEMIDE 40 MG: 40 TABLET ORAL at 18:39

## 2019-04-07 RX ADMIN — INSULIN LISPRO 3 UNITS: 100 INJECTION, SOLUTION INTRAVENOUS; SUBCUTANEOUS at 12:28

## 2019-04-07 RX ADMIN — DIBASIC SODIUM PHOSPHATE, MONOBASIC POTASSIUM PHOSPHATE AND MONOBASIC SODIUM PHOSPHATE 1 TABLET: 852; 155; 130 TABLET ORAL at 12:27

## 2019-04-07 RX ADMIN — INSULIN LISPRO 2 UNITS: 100 INJECTION, SOLUTION INTRAVENOUS; SUBCUTANEOUS at 16:30

## 2019-04-07 RX ADMIN — HEPARIN SODIUM 5000 UNITS: 5000 INJECTION INTRAVENOUS; SUBCUTANEOUS at 12:27

## 2019-04-07 RX ADMIN — FLUTICASONE FUROATE AND VILANTEROL TRIFENATATE: 100; 25 POWDER RESPIRATORY (INHALATION) at 08:22

## 2019-04-07 RX ADMIN — METOPROLOL TARTRATE 25 MG: 25 TABLET ORAL at 18:39

## 2019-04-07 RX ADMIN — Medication 10 ML: at 21:31

## 2019-04-07 RX ADMIN — ASPIRIN 81 MG 81 MG: 81 TABLET ORAL at 08:19

## 2019-04-07 RX ADMIN — HEPARIN SODIUM 5000 UNITS: 5000 INJECTION INTRAVENOUS; SUBCUTANEOUS at 03:11

## 2019-04-07 RX ADMIN — Medication 10 ML: at 05:28

## 2019-04-07 RX ADMIN — IPRATROPIUM BROMIDE AND ALBUTEROL SULFATE 3 ML: .5; 3 SOLUTION RESPIRATORY (INHALATION) at 15:04

## 2019-04-07 RX ADMIN — PRAVASTATIN SODIUM 40 MG: 40 TABLET ORAL at 21:31

## 2019-04-07 RX ADMIN — INSULIN GLARGINE 40 UNITS: 100 INJECTION, SOLUTION SUBCUTANEOUS at 08:20

## 2019-04-07 RX ADMIN — HEPARIN SODIUM 5000 UNITS: 5000 INJECTION INTRAVENOUS; SUBCUTANEOUS at 21:31

## 2019-04-07 RX ADMIN — FUROSEMIDE 40 MG: 40 TABLET ORAL at 08:19

## 2019-04-07 RX ADMIN — DIBASIC SODIUM PHOSPHATE, MONOBASIC POTASSIUM PHOSPHATE AND MONOBASIC SODIUM PHOSPHATE 1 TABLET: 852; 155; 130 TABLET ORAL at 18:39

## 2019-04-07 RX ADMIN — IPRATROPIUM BROMIDE AND ALBUTEROL SULFATE 3 ML: .5; 3 SOLUTION RESPIRATORY (INHALATION) at 22:01

## 2019-04-07 RX ADMIN — Medication 10 ML: at 14:35

## 2019-04-07 NOTE — PROGRESS NOTES
Hospitalist Progress Note NAME: Humaira Rivera :  1970 MRN:  [id number] Assessment / Plan: 
 
Acute on chronic hypercapnic and hypoxic respiratory failure: suspect due to non-compliance with NIPPV. She doesn't appear acutely dyspneic, her dyspnea is likely progressive due to her numerous respiratory problems stemming from her obesity, COPD, GEN/OHS 
-BiPAP support 
-no evidence of bronchospasm clinically though exam is limited 
-schedule duonebs 
-no steroids or antibiotics for now 
-appreciate pulm eval, no acute exac/pna 
  
Chronic diastolic CHF Intermittent CP 
-continue furosemide, back on po, since cards does not feel acute CHF 
-unreliable wt from 169 to 188kg overnight, follow 
-neg trop x2 
-unable to get echo or stress due to size 
-consider cath if needed to r/o recurrent ischemic dz as cause of resp sxs and cp 
-to be evaluated tomorrow for possible cath 
 
hypophos 
-supp po and recheck in am 
 
  
COPD, no exac 
  
GEN/OHS, noncompliant with trilogy 
  
Type 2 DM: 
-continue Lantus, SSI/POC checks 
  
Chronic lymphedema: 
-wound care 
-no DVT on us on L 
  
Super-morbid obesity BMI 60 
  
Medical non-compliance: 
  
DVT ppx: sq heparin Code status: full Recommended Disposition:  PT, OT, RN Tried to [employer] yesterday with outpt cards f/u once confirmed ok with cards, but pt says she has no pcp and CM can't arrange home health w/o pcp or assistance with transportation on weekend (she says he can't get to cards f/u). Will stay until Monday to arrange safe dc. Subjective: Chief Complaint / Reason for Physician Visit 
sob  Patient complains that she has pain in her leg from her bed. She denied any other specific complaints says she is still short of breath still with intermittent chest pain. She says she would be agreeable to getting a catheterization of Dr. Santino Malcolm felt she needed it.   We discussed the possibility of discharge and follow-up as an outpatient with Dr. Laura Sun if okay with cardiology. She reports that her oxygen levels keep falling however she is been stable on her home 5 L since admission. 4/7 pt reports some cp this am, now gone. Initially reports she was not doing well, but then admitted breathing a little better. We discussed needing a pcp to arrange home health and plans for cards eval tomorrow for possible cath. She became angry and said she was not going to stay for cath, needs to get home tomorrow to pay bill. I explained I can't dc safely w/o pcp and home health arrangements and ongoing cp. she would need to leave against medical advice today. Patient was evaluated at 10:15 AM 
 
Discussed with RN events overnight. Review of Systems: 
Symptom Y/N Comments  Symptom Y/N Comments Fever/Chills    Chest Pain y Poor Appetite n   Edema Cough    Abdominal Pain n   
Sputum n   Joint Pain SOB/RAZO y   Pruritis/Rash Nausea/vomit n   Tolerating PT/OT Diarrhea n   Tolerating Diet y Constipation    Other Could NOT obtain due to:   
 
Objective: VITALS:  
Last 24hrs VS reviewed since prior progress note. Most recent are: 
Patient Vitals for the past 24 hrs: 
 Temp Pulse Resp BP SpO2  
04/07/19 1057 98.6 °F (37 °C) 85 20 142/74 91 % 04/07/19 0745 97.2 °F (36.2 °C) 73 18 105/68 96 % 04/07/19 0424     96 % 04/07/19 0356 98.1 °F (36.7 °C) 79 18 106/68 97 % 04/07/19 0050     98 % 04/06/19 2255 98.7 °F (37.1 °C) 78 18 113/65 96 % 04/06/19 2144     96 % 04/06/19 1854 98.4 °F (36.9 °C) 81 18 119/70 95 % 04/06/19 1753  88  108/79   
04/06/19 1704 98 °F (36.7 °C) 84 16 95/48 95 % 04/06/19 1430     96 % Intake/Output Summary (Last 24 hours) at 4/7/2019 1215 Last data filed at 4/7/2019 1100 Gross per 24 hour Intake  Output 1900 ml Net -1900 ml PHYSICAL EXAM: 
 Patient is awake and alert in bed on nasal cannula no distress. Conjunctiva pink mucous membranes moist cardiovascular regular rate distant no murmurs rubs or gallops. Lungs decreased breath sounds bilaterally no wheezes rhonchi or crackles. Abdomen is obese bowel sounds present soft nontender. Extremities bilateral lymphedema left greater than right no change in exam 
 
Reviewed most current lab test results and cultures  YES Reviewed most current radiology test results   YES Review and summation of old records today    NO Reviewed patient's current orders and MAR    YES 
PMH/SH reviewed - no change compared to H&P 
________________________________________________________________________ Care Plan discussed with: 
  Comments Patient y Family RN y   
Care Manager Consultant Multidiciplinary team rounds were held today with , nursing, pharmacist and clinical coordinator. Patient's plan of care was discussed; medications were reviewed and discharge planning was addressed. ________________________________________________________________________ Total NON critical care TIME:    Minutes Total CRITICAL CARE TIME Spent:   Minutes non procedure based Comments >50% of visit spent in counseling and coordination of care    
________________________________________________________________________ Tori Tamayo MD  
 
Procedures: see electronic medical records for all procedures/Xrays and details which were not copied into this note but were reviewed prior to creation of Plan. LABS: 
I reviewed today's most current labs and imaging studies. Pertinent labs include: 
Recent Labs 04/04/19 
2131 WBC 9.4 HGB 12.2 HCT 42.0  Recent Labs 04/06/19 
0350 04/04/19 2131  138  
K 3.7 3.8 CL 99 100 CO2 39* 36* * 129* BUN 20 18 CREA 0.73 0.69  
CA 10.3* 10.0 MG 2.0  --   
PHOS 2.0*  --   
ALB  --  3.0*  
 TBILI  --  0.3 SGOT  --  12* ALT  --  16 Signed: Mayank Jauregui MD

## 2019-04-07 NOTE — PROGRESS NOTES
Cardiology Progress Note 4/7/2019 12:02 PM 
 
Admit Date: 4/4/2019 Admit Diagnosis: Acute on chronic respiratory failure with hypercapnia (HCC) [J96.22] Subjective:  
 
Estelle oRcha denies any chest pain. C/o leg pains. Visit Vitals /74 (BP 1 Location: Left arm, BP Patient Position: Sitting) Pulse 85 Temp 98.6 °F (37 °C) Resp 20 Ht 5' 6\" (1.676 m) Wt (!) 414 lb 7.4 oz (188 kg) SpO2 91% BMI 66.90 kg/m² Current Facility-Administered Medications Medication Dose Route Frequency  furosemide (LASIX) tablet 40 mg  40 mg Oral BID  aspirin chewable tablet 81 mg  81 mg Oral DAILY  fluticasone-vilanterol (BREO ELLIPTA) 100mcg-25mcg/puff   Inhalation DAILY  insulin glargine (LANTUS) injection 40 Units  40 Units SubCUTAneous DAILY  pravastatin (PRAVACHOL) tablet 40 mg  40 mg Oral QHS  metoprolol tartrate (LOPRESSOR) tablet 25 mg  25 mg Oral BID  sodium chloride (NS) flush 5-40 mL  5-40 mL IntraVENous Q8H  
 sodium chloride (NS) flush 5-40 mL  5-40 mL IntraVENous PRN  
 acetaminophen (TYLENOL) tablet 650 mg  650 mg Oral Q4H PRN  
 ondansetron (ZOFRAN) injection 4 mg  4 mg IntraVENous Q4H PRN  
 heparin (porcine) injection 5,000 Units  5,000 Units SubCUTAneous Q8H  
 albuterol (PROVENTIL VENTOLIN) nebulizer solution 2.5 mg  2.5 mg Nebulization Q4H PRN  
 insulin lispro (HUMALOG) injection   SubCUTAneous AC&HS  
 glucose chewable tablet 16 g  4 Tab Oral PRN  
 dextrose (D50) infusion 12.5-25 g  12.5-25 g IntraVENous PRN  
 glucagon (GLUCAGEN) injection 1 mg  1 mg IntraMUSCular PRN  
 albuterol-ipratropium (DUO-NEB) 2.5 MG-0.5 MG/3 ML  3 mL Nebulization TID RT  
 cyclobenzaprine (FLEXERIL) tablet 5 mg  5 mg Oral BID PRN Objective:  
  
Physical Exam: 
Visit Vitals /74 (BP 1 Location: Left arm, BP Patient Position: Sitting) Pulse 85 Temp 98.6 °F (37 °C) Resp 20 Ht 5' 6\" (1.676 m) Wt (!) 414 lb 7.4 oz (188 kg) SpO2 91% BMI 66.90 kg/m² General Appearance:  Well developed, well nourished,alert and oriented x 3, and individual in no acute distress. Ears/Nose/Mouth/Throat:   Hearing grossly normal. 
  
    Neck: Supple. Chest:   Lungs clear to auscultation bilaterally. Cardiovascular:  Regular rate and rhythm, S1, S2 normal, no murmur. Abdomen:   Soft, non-tender, bowel sounds are active. Extremities:  edema bilaterally, left leg in dressing. Skin: Warm and dry. Data Review:  
Labs:   
Recent Results (from the past 24 hour(s)) GLUCOSE, POC Collection Time: 04/06/19  5:15 PM  
Result Value Ref Range Glucose (POC) 171 (H) 65 - 100 mg/dL Performed by SKY PICHARDO ([facility]) RAJINDER   
GLUCOSE, POC Collection Time: 04/06/19  8:34 PM  
Result Value Ref Range Glucose (POC) 263 (H) 65 - 100 mg/dL Performed by Argenis Maloney GLUCOSE, POC Collection Time: 04/07/19  7:48 AM  
Result Value Ref Range Glucose (POC) 129 (H) 65 - 100 mg/dL Performed by Mary Jo [facility] ([facility]) GLUCOSE, POC Collection Time: 04/07/19 11:45 AM  
Result Value Ref Range Glucose (POC) 204 (H) 65 - 100 mg/dL Performed by [facility] (PCT) Telemetry: normal sinus rhythm Assessment:  
 
Principal Problem: 
  Acute on chronic respiratory failure with hypercapnia ([facility] (4/5/2019) Plan:  
 
Continue current therapy. Can d/c from cardiac stand point. Dr. Kota Seymour will see in AM to discuss long term plans.   
 
Romy Bradford MD

## 2019-04-07 NOTE — PROGRESS NOTES
Report received from [facility]. Introduced myself as primary RN. Assumed care of patient. Instructed patient to call for assistance prior to getting up. Pt verbalized understanding. Call bell within reach. 3:00 PM Wound care performed per protocol. However wicking cloth and tubisock was not in stock. Site cleansed, xerofoam, abd pad, 4x4 and julian applied. Patient tolerated well. Bedside and Verbal shift change report given to [facility] (oncoming nurse) by González Blakely (offgoing nurse). Report included the following information SBAR, Kardex, MAR, Recent Results and Med Rec Status.

## 2019-04-07 NOTE — PROGRESS NOTES
Problem: Pressure Injury - Risk of 
Goal: *Prevention of pressure injury Description Document Jeffrey Scale and appropriate interventions in the flowsheet. Outcome: Progressing Towards Goal 
  
Problem: Patient Education: Go to Patient Education Activity Goal: Patient/Family Education Outcome: Progressing Towards Goal 
  
Problem: Risk for Spread of Infection Goal: Prevent transmission of infectious organism to others Description Prevent the transmission of infectious organisms to other patients, staff members, and visitors. Outcome: Progressing Towards Goal 
  
Problem: Patient Education:  Go to Education Activity Goal: Patient/Family Education Outcome: Progressing Towards Goal 
  
Problem: Falls - Risk of 
Goal: *Absence of Falls Description Document Fercho Zavaleta Fall Risk and appropriate interventions in the flowsheet. Outcome: Progressing Towards Goal 
  
Problem: Patient Education: Go to Patient Education Activity Goal: Patient/Family Education Outcome: Progressing Towards Goal 
  
Problem: Breathing Pattern - Ineffective Goal: *Absence of hypoxia Outcome: Progressing Towards Goal

## 2019-04-07 NOTE — PROGRESS NOTES
Patient signed bed alarm refusal.  Patient is aware of indications for use. Patient stated \"take that thing off now. Copy left on chart.  
 
Lukas Howard RN

## 2019-04-08 ENCOUNTER — NURSE NAVIGATOR (OUTPATIENT)
Dept: FAMILY MEDICINE CLINIC | Age: 49
End: 2019-04-08

## 2019-04-08 ENCOUNTER — TELEPHONE (OUTPATIENT)
Dept: PALLATIVE CARE | Age: 49
End: 2019-04-08

## 2019-04-08 VITALS
HEIGHT: 66 IN | DIASTOLIC BLOOD PRESSURE: 73 MMHG | SYSTOLIC BLOOD PRESSURE: 146 MMHG | WEIGHT: 293 LBS | RESPIRATION RATE: 24 BRPM | OXYGEN SATURATION: 92 % | TEMPERATURE: 98.6 F | HEART RATE: 82 BPM | BODY MASS INDEX: 47.09 KG/M2

## 2019-04-08 LAB
ANION GAP SERPL CALC-SCNC: 3 MMOL/L (ref 5–15)
BUN SERPL-MCNC: 18 MG/DL (ref 6–20)
BUN/CREAT SERPL: 24 (ref 12–20)
CALCIUM SERPL-MCNC: 10.1 MG/DL (ref 8.5–10.1)
CHLORIDE SERPL-SCNC: 99 MMOL/L (ref 97–108)
CO2 SERPL-SCNC: 35 MMOL/L (ref 21–32)
CREAT SERPL-MCNC: 0.75 MG/DL (ref 0.55–1.02)
GLUCOSE BLD STRIP.AUTO-MCNC: 158 MG/DL (ref 65–100)
GLUCOSE BLD STRIP.AUTO-MCNC: 265 MG/DL (ref 65–100)
GLUCOSE SERPL-MCNC: 169 MG/DL (ref 65–100)
MAGNESIUM SERPL-MCNC: 2.2 MG/DL (ref 1.6–2.4)
PHOSPHATE SERPL-MCNC: 3.4 MG/DL (ref 2.6–4.7)
POTASSIUM SERPL-SCNC: 3.8 MMOL/L (ref 3.5–5.1)
SERVICE CMNT-IMP: ABNORMAL
SERVICE CMNT-IMP: ABNORMAL
SODIUM SERPL-SCNC: 137 MMOL/L (ref 136–145)

## 2019-04-08 PROCEDURE — 94640 AIRWAY INHALATION TREATMENT: CPT

## 2019-04-08 PROCEDURE — 36415 COLL VENOUS BLD VENIPUNCTURE: CPT

## 2019-04-08 PROCEDURE — 80048 BASIC METABOLIC PNL TOTAL CA: CPT

## 2019-04-08 PROCEDURE — 74011250637 HC RX REV CODE- 250/637: Performed by: EMERGENCY MEDICINE

## 2019-04-08 PROCEDURE — 74011636637 HC RX REV CODE- 636/637: Performed by: HOSPITALIST

## 2019-04-08 PROCEDURE — 82962 GLUCOSE BLOOD TEST: CPT

## 2019-04-08 PROCEDURE — 74011250636 HC RX REV CODE- 250/636: Performed by: HOSPITALIST

## 2019-04-08 PROCEDURE — 84100 ASSAY OF PHOSPHORUS: CPT

## 2019-04-08 PROCEDURE — 74011250637 HC RX REV CODE- 250/637: Performed by: HOSPITALIST

## 2019-04-08 PROCEDURE — 83735 ASSAY OF MAGNESIUM: CPT

## 2019-04-08 PROCEDURE — 74011000250 HC RX REV CODE- 250: Performed by: HOSPITALIST

## 2019-04-08 RX ORDER — RANOLAZINE 500 MG/1
500 TABLET, EXTENDED RELEASE ORAL 2 TIMES DAILY
Qty: 60 TAB | Refills: 8 | Status: SHIPPED | OUTPATIENT
Start: 2019-04-08 | End: 2020-01-29

## 2019-04-08 RX ORDER — RANOLAZINE 500 MG/1
500 TABLET, EXTENDED RELEASE ORAL 2 TIMES DAILY
Status: DISCONTINUED | OUTPATIENT
Start: 2019-04-08 | End: 2019-04-08 | Stop reason: HOSPADM

## 2019-04-08 RX ADMIN — INSULIN LISPRO 5 UNITS: 100 INJECTION, SOLUTION INTRAVENOUS; SUBCUTANEOUS at 13:43

## 2019-04-08 RX ADMIN — ASPIRIN 81 MG 81 MG: 81 TABLET ORAL at 08:17

## 2019-04-08 RX ADMIN — FUROSEMIDE 40 MG: 40 TABLET ORAL at 08:17

## 2019-04-08 RX ADMIN — HEPARIN SODIUM 5000 UNITS: 5000 INJECTION INTRAVENOUS; SUBCUTANEOUS at 13:43

## 2019-04-08 RX ADMIN — INSULIN GLARGINE 40 UNITS: 100 INJECTION, SOLUTION SUBCUTANEOUS at 08:13

## 2019-04-08 RX ADMIN — DIBASIC SODIUM PHOSPHATE, MONOBASIC POTASSIUM PHOSPHATE AND MONOBASIC SODIUM PHOSPHATE 1 TABLET: 852; 155; 130 TABLET ORAL at 08:17

## 2019-04-08 RX ADMIN — CYCLOBENZAPRINE HYDROCHLORIDE 5 MG: 10 TABLET, FILM COATED ORAL at 08:16

## 2019-04-08 RX ADMIN — IPRATROPIUM BROMIDE AND ALBUTEROL SULFATE 3 ML: .5; 3 SOLUTION RESPIRATORY (INHALATION) at 07:44

## 2019-04-08 RX ADMIN — INSULIN LISPRO 2 UNITS: 100 INJECTION, SOLUTION INTRAVENOUS; SUBCUTANEOUS at 08:14

## 2019-04-08 RX ADMIN — FLUTICASONE FUROATE AND VILANTEROL TRIFENATATE: 100; 25 POWDER RESPIRATORY (INHALATION) at 08:18

## 2019-04-08 RX ADMIN — Medication 10 ML: at 05:34

## 2019-04-08 RX ADMIN — METOPROLOL TARTRATE 25 MG: 25 TABLET ORAL at 08:15

## 2019-04-08 RX ADMIN — HEPARIN SODIUM 5000 UNITS: 5000 INJECTION INTRAVENOUS; SUBCUTANEOUS at 05:33

## 2019-04-08 NOTE — PROGRESS NOTES
CM met with pt and discussed PCP's. Pt stated she had a home based PCP and they don't take her insurance anymore. Pt last saw the home based PCP in February. CM spoke with the  home based physician group and they are unable to provide care due to pt's insurance. They stated pt has [facility]. CM noted pts' insurance listed as Medicare and secondary CCC[employer] Medicaid. CM sent email to the [facility] financial dept to check pt's insurance. They informed this CM that pt is on the waiting list at [facility]. CM called [facility] and left a message. CM sent [facility] referrals to [address] [place] [address] via [employer]. CM placed transport on will call. Medicare pt has received, reviewed, and signed 2nd IM letter informing them of their right to appeal the discharge. Signed copied has been placed on pt bedside chart. 1:15pm - [facility] accepted pt for home health. [facility] denied pt for home health. CM informed pt. Pt stated she called the [facility] and she will continue with them now. They will come and see her in a week. Pt informed this CM that she wants to make her own appt with cardiology since she will need to work out a ride. Pt stated her aide goes to the grocery store for her and may help with a ride. CM informed [facility] to  pt. They can be here at 2pm. CM informed pt and pt's nurse. Care Management Interventions PCP Verified by CM: Yes([facility]) Mode of Transport at Discharge: BLS([facility]) Transition of Care Consult (CM Consult): Discharge Planning, Home Health([facility]) [address]: No 
Reason Outside [place]: Patient already serviced by other home care/hospice agency Discharge Durable Medical Equipment: No 
Physical Therapy Consult: No 
Occupational Therapy Consult: No 
Current Support Network: Lives Alone, Own Home Confirm Follow Up Transport: Other (see comment)(medical transport) Plan discussed with Pt/Family/Caregiver:  Yes 
 Freedom of Choice Offered: Yes Discharge Location Discharge Placement: Home with home health Josesito Seymour, [address]

## 2019-04-08 NOTE — DISCHARGE INSTRUCTIONS
HOSPITALIST DISCHARGE INSTRUCTIONS    NAME: Shavonne Naranjo   :  1970   MRN:  [id number]     Date/Time:  2019 11:25 AM    ADMIT DATE: 2019   DISCHARGE DATE: 2019         · It is important that you take the medication exactly as they are prescribed. · Keep your medication in the bottles provided by the pharmacist and keep a list of the medication names, dosages, and times to be taken in your wallet. · Do not take other medications without consulting your doctor. What to do at Home    Recommended diet:  Diabetic Diet    Recommended activity: Activity as tolerated and PT/OT per Home Health      If you have questions regarding the hospital related prescriptions or hospital related issues please call [facility] at [phone]. You can always direct your questions to your primary care doctor if you are unable to reach your hospital physician; your PCP works as an extension of your hospital doctor just like your hospital doctor is an extension of your PCP for your time at the hospital [facility], [facility])    If you experience any of the following symptoms then please call your primary care physician or return to the emergency room if you cannot get hold of your doctor:    Fever, chills, nausea, vomiting, or persistent diarrhea  Worsening weakness or new problems with your speech or balance  Dark stools or visible blood in your stools  New Leg swelling or shortness of breath as these could be signs of a clot    Additional Instructions:      Bring these papers with you to your follow up appointments.  The papers will help your doctors be sure to continue the care plan from the hospital.    F/u with pcp in 3 days  F/u with cardiology in 1 week  Use trilogy every night and with naps  F/u with pulm in 1-2 weeks  Call MD if increasing shortness of breath, chest pain, fever chills          Information obtained by :  I understand that if any problems occur once I am at home I am to contact my physician. I understand and acknowledge receipt of the instructions indicated above.                                                                                                                                            Physician's or R.N.'s Signature                                                                  Date/Time                                                                                                                                              Patient or Representative Signature

## 2019-04-08 NOTE — DISCHARGE SUMMARY
Hospitalist Discharge Summary     Patient ID:  Mayi Srivastava  [id number]  23 y.o.  1970    PCP on record: None    Admit date: 4/4/2019  Discharge date and time: 4/8/2019      DISCHARGE DIAGNOSIS:    Acute on chronic hypercapnic and hypoxic respiratory failure secondary to medical noncompliance with her trilogy. Chronic COPD without exacerbation  Chronic obstructive sleep apnea and obesity hypoventilation syndrome  Chronic diastolic heart failure without exacerbation  Reported intermittent chest pain with negative troponins, medical management until demonstrates compliance per cardiology  Hypophosphatemia  Type 2 diabetes  Chronic lymphedema and ongoing wound care  Super morbid obesity with a BMI of 60  Medical noncompliance          CONSULTATIONS:  IP CONSULT TO HOSPITALIST  IP CONSULT TO INTENSIVIST  IP CONSULT TO CARDIOLOGY    Excerpted HPI from H&P of Chencho Covington MD:  CC: shortness of breath        HPI: 57 y.o lady w/ HTN, COPD chronic hypoxic respiratory failure on 5L O2, GEN/OHS, STEMI, lymphedema, medical non-compliance, who presents with dyspnea. She reports progressive acute-on-chronic dyspnea for the past 4 days, associated with left-sided chest pressure at times. No fever, chronic cough unchanged, no exacerbating or alleviating factors. She hasn't been using her BiPAP regularly, first citing that it broke then stating she can't tolerate it.              ______________________________________________________________________  DISCHARGE SUMMARY/HOSPITAL COURSE:  for full details see H&P, daily progress notes, labs, consult notes. Acute on chronic hypercapnic and hypoxic respiratory failure: suspect due to non-compliance with NIPPV.  She doesn't appear acutely dyspneic, her dyspnea is likely progressive due to her numerous respiratory problems stemming from her obesity, COPD, GEN/OHS  -BiPAP support  -no evidence of bronchospasm clinically though exam is limited  -schedule duonebs  -no steroids or antibiotics for now  -appreciate pulm eval, no acute exac/pna  -f/u with her primary pulm as outpt     Chronic diastolic CHF  Intermittent CP  -continue furosemide, back on po, since cards does not feel acute CHF  -unreliable wt from 169 to 188kg overnight, follow  -neg trop x2  -unable to get echo or stress due to size  -consider cath if needed to r/o recurrent ischemic dz as cause of resp sxs and cp  -not a candidate for cath bc of noncompliance. Cards will consider if shows compliance as outpt     hypophos  -improved with supp po         COPD, no exac     GEN/OHS, noncompliant with trilogy     Type 2 DM:  -continue Lantus, SSI/POC checks     Chronic lymphedema:  -wound care  -no DVT on us on L     Super-morbid obesity BMI 60     Medical non-compliance    Recommended Disposition:     Patient will be discharged home today if we can arrange a primary care physician to follow her as well as home health for nursing and PT. We will also make a follow-up cardiology appointment for her and give her information on how to get transportation to [facility]. After long discussion with cardiology, patient must demonstrate medical compliance to consider pursuing catheterization and intervention for ischemic disease if present. At this point cardiology wants to continue medical management and will added Ranexa to her aspirin, beta blocker and statin. She will need to follow-up with Dr. Maral Corral as an outpatient to determine future plans for ischemic evaluation depending on her compliance. We have also discussed that a lot of her issues are related to her weight including her respiratory status and she received diet education. pcp with [facility] house call arranged at [place].      _______________________________________________________________________  Patient seen and examined by me on discharge day. Pertinent Findings:  Patient has no new complaints.   We discussed setting up her PCP and home health for follow-up as well as information for transportation for her to get to her cardiology appointment after discharge. Patient is awake and alert oriented x3 on nasal cannula no distress cardiovascular regular rate distant no murmurs rubs or gallops lungs decreased breath sounds bilaterally no wheezes rhonchi crackles abdomen is obese extremities chronic lymphedema    _______________________________________________________________________  DISCHARGE MEDICATIONS:   Current Discharge Medication List      START taking these medications    Details   ranolazine ER (RANEXA) 500 mg SR tablet Take 1 Tab by mouth two (2) times a day. Qty: 60 Tab, Refills: 8         CONTINUE these medications which have NOT CHANGED    Details   cyclobenzaprine (FLEXERIL) 5 mg tablet Take 1 Tab by mouth two (2) times daily as needed for Muscle Spasm(s). flexeriol  Qty: 5 Tab, Refills: 0      albuterol-ipratropium (DUO-NEB) 2.5 mg-0.5 mg/3 ml nebu 3 mL by Nebulization route four (4) times daily. Qty: 100 Nebule, Refills: 1      furosemide (LASIX) 40 mg tablet Take 40 mg by mouth two (2) times a day. nystatin (MYCOSTATIN) topical cream Apply  to affected area two (2) times a day. nystatin (MYCOSTATIN) powder Apply  to affected area two (2) times a day. fluticasone (FLONASE) 50 mcg/actuation nasal spray 2 Sprays by Both Nostrils route daily. Qty: 1 Bottle, Refills: 0      insulin glargine (LANTUS) 100 unit/mL injection 45 Units by SubCUTAneous route daily. metoprolol tartrate (LOPRESSOR) 25 mg tablet Take 25 mg by mouth two (2) times a day. fluticasone-salmeterol (ADVAIR DISKUS) 500-50 mcg/dose diskus inhaler Take 1 Puff by inhalation every twelve (12) hours. loratadine (CLARITIN) 10 mg tablet Take 10 mg by mouth daily. albuterol (VENTOLIN HFA) 90 mcg/actuation inhaler Take 2 Puffs by inhalation every six (6) hours as needed for Wheezing. aspirin 81 mg chewable tablet Take 81 mg by mouth daily. hydrOXYzine pamoate (VISTARIL) 50 mg capsule Take 50 mg by mouth every eight (8) hours as needed for Itching. lovastatin (MEVACOR) 40 mg tablet Take 1 Tab by mouth nightly. Qty: 30 Tab, Refills: 6    Associated Diagnoses: Atherosclerosis of native coronary artery without angina pectoris      glyBURIDE (DIABETA) 5 mg tablet Take 5 mg by mouth two (2) times daily (with meals). ammonium lactate (LAC-HYDRIN) 12 % topical cream rub in to affected area well  Qty: 280 g, Refills: 1      nitroglycerin (NITROSTAT) 0.4 mg SL tablet 1 Tab by SubLINGual route every five (5) minutes as needed for Chest Pain (call 911 if not relieved by 3). Qty: 25 Tab, Refills: 2    Associated Diagnoses: SVT (supraventricular tachycardia) ([facility]); CHF (congestive heart failure) ([address]             My Recommended Diet, Activity, Wound Care, and follow-up labs are listed in the patient's Discharge Insturctions which I have personally completed and reviewed.     ______________________________________________________________________    Risk of deterioration: High    Condition at Discharge:  Stable  ______________________________________________________________________    Disposition  Home with family and home health services  ______________________________________________________________________    Care Plan discussed with:   Patient, RN, Care Manager, Consultant    ______________________________________________________________________    Code Status: Full Code  ______________________________________________________________________      Follow up with:   PCP : None  Follow-up Information     Follow up With Specialties Details Why Contact Info    [facility]   pt will call in 1 week [facility] Abbie Iglesias [address]    Amrita Santos MD Cardiology, [address] [facility], Internal Medicine Schedule an appointment as soon as possible for a visit in 2 weeks for cardiology follow up - pt will call for the appt [address] [facility], [address] [facility], [address] [facility] In 1 week  [address] [place] [address]  [phone]      AT [address] [place] [address] and Social Work  [address] [place] [address]  [phone]              Total time in minutes spent coordinating this discharge (includes going over instructions, follow-up, prescriptions, and preparing report for sign off to her PCP) :  40 minutes    Signed:  Karolina James MD

## 2019-04-08 NOTE — PROGRESS NOTES
[address] [place] [address]  [phone] Cardiology Progress Note 4/8/2019 10AM 
 
Admit Date: 4/4/2019 Admit Diagnosis:  
Acute on chronic respiratory failure with hypercapnia (HCC) [J96.22] Subjective:  
 
Marissa Ferguson is a 57 y.o. female with PMH GEN, SVT, COPD, dHF, lymphedema, NSTEMI who was admitted for Acute on chronic respiratory failure with hypercapnia ([facility] [J96.22]. Overnight events: 
-VSS 
-labs steady 
-weight down 2#; I/O incomplete 
-MsMango Chang is feeling pretty good today. Still has some SOB and RAZO. No chest pain/pressure. Visit Vitals /73 (BP 1 Location: Left arm, BP Patient Position: At rest) Pulse 82 Temp 98.6 °F (37 °C) Resp 24 Ht 5' 6\" (1.676 m) Wt (!) 412 lb 6.4 oz (187.1 kg) SpO2 92% BMI 66.56 kg/m² Current Facility-Administered Medications Medication Dose Route Frequency  ranolazine ER (RANEXA) tablet 500 mg  500 mg Oral BID  phosphorus (K PHOS NEUTRAL) 250 mg tablet 1 Tab  1 Tab Oral BID  furosemide (LASIX) tablet 40 mg  40 mg Oral BID  aspirin chewable tablet 81 mg  81 mg Oral DAILY  fluticasone-vilanterol (BREO ELLIPTA) 100mcg-25mcg/puff   Inhalation DAILY  insulin glargine (LANTUS) injection 40 Units  40 Units SubCUTAneous DAILY  pravastatin (PRAVACHOL) tablet 40 mg  40 mg Oral QHS  metoprolol tartrate (LOPRESSOR) tablet 25 mg  25 mg Oral BID  sodium chloride (NS) flush 5-40 mL  5-40 mL IntraVENous Q8H  
 sodium chloride (NS) flush 5-40 mL  5-40 mL IntraVENous PRN  
 acetaminophen (TYLENOL) tablet 650 mg  650 mg Oral Q4H PRN  
 ondansetron (ZOFRAN) injection 4 mg  4 mg IntraVENous Q4H PRN  
 heparin (porcine) injection 5,000 Units  5,000 Units SubCUTAneous Q8H  
 albuterol (PROVENTIL VENTOLIN) nebulizer solution 2.5 mg  2.5 mg Nebulization Q4H PRN  
 insulin lispro (HUMALOG) injection   SubCUTAneous AC&HS  
 glucose chewable tablet 16 g  4 Tab Oral PRN  
  dextrose (D50) infusion 12.5-25 g  12.5-25 g IntraVENous PRN  
 glucagon (GLUCAGEN) injection 1 mg  1 mg IntraMUSCular PRN  
 albuterol-ipratropium (DUO-NEB) 2.5 MG-0.5 MG/3 ML  3 mL Nebulization TID RT  
 cyclobenzaprine (FLEXERIL) tablet 5 mg  5 mg Oral BID PRN Objective:  
  
Physical Exam: 
General: morbidly obese AAF resting in bed in NAD Heart: RRR, no murmur Lungs: dim Abdomen: Soft, +BS, NTND Extremities:palpable distal pulses;  Lymphedema L>R Neurologic: Grossly normal 
Skin:  Warm and dry. dressings over LLE Data Review: No results for input(s): WBC, HGB, HCT, PLT, HGBEXT, HCTEXT, PLTEXT, HGBEXT, HCTEXT, PLTEXT in the last 72 hours. Recent Labs 04/08/19 
8100 04/06/19 
0350  139  
K 3.8 3.7 CL 99 99 CO2 35* 39* * 194* BUN 18 20 CREA 0.75 0.73 CA 10.1 10.3* MG 2.2 2.0 PHOS 3.4 2.0* Recent Labs 04/05/19 
1347 TROIQ <0.05 Intake/Output Summary (Last 24 hours) at 4/8/2019 1341 Last data filed at 4/8/2019 1229 Gross per 24 hour Intake 870 ml Output 2800 ml Net -1930 ml  
  
 
  
Telemetry: SR-ST 
ECG: SR 
CXRAY: no acute process Assessment:  
 
Principal Problem: 
  Acute on chronic respiratory failure with hypercapnia ([facility] (4/5/2019) Active Problems: 
  Asthma (8/4/2012) Obesity hypoventilation syndrome ([address] (8/4/2012) S/P PTCA (percutaneous transluminal coronary angioplasty) (8/22/2012) Overview: 2.5x12 mm Promus Element early mid Ramus Intermedius, 2. 5x8 mm Promus Element proximal Ramus Intermedius 8/18/12 Distal LAD total occlusion proved to be chronic by wire probe at that time Coronary atherosclerosis of native coronary artery (8/22/2012) Noncompliance with therapeutic plan (10/22/2012) BMI 60.0-69.9, adult ([facility] (1/13/2013) HTN (hypertension) (1/9/2016) Plan:  
 
Acute on chronic respiratory failure: improving.   In setting of super morbid obesity and not wearing her trilogy for ~1 month. CXR clear. Unable to obtain new ECHO due to body habitus. Chronic diastolic heart failure - compensated. · Discussed with patient that if she cannot tolerate her home trilogy she needs to follow up with the sleep center · Discussed weight loss/diet with patient and the continued risk to her health with her weight · COPD/GEN/OHS  per hospitalist   
· Home lasix dose CAD history/risk:  Troponin negative. EKG nonischemic. Unable to repeat ECHO due to size. · Due to resolved CP, non compliance and lack of follow up, with negative troponin and no EKG changes, patient is not indicated for cardiac cath for ischemic eval at this time (PCI class III-contra[facility]). Can consider in future if patient becomes more compliant. · ASA, statin, BB 
· Add ranexa due to chest pressure PTA. Has history of migraines - so avoiding imdur. HTN: steady · Continue current meds Chronic lymphedema:  Wound care team evaluated · Home lasix. Supermorbid obesity, BMI 66: 
Counseled on diet and exercise (latter will be extremely limited)- eventual goal of 30-60 minutes 5-7 times a week as per AHA guidelines. Evelyn Buckley, MARLEEN 
DNP, RN, AGACNP-BC Patient seen and examined by me with nurse practitioner Evelyn Buckley. I personally performed all components of the history, physical, and medical decision making and agree with the assessment and plan with minor modifications as noted. It seems that most of the patient's hypoxic respiratory failure relates to obesity hypoventilation syndrome. Symptoms are improved. Add Ranexa, counseled at great length on the need for extreme weight loss and consistent follow-up in the office. If recurrent symptoms despite maximal medical management in the future, can consider ischemic evaluation if the patient demonstrates compliance.   So far, nearly 100% noncompliant with outpatient follow-up.

## 2019-04-08 NOTE — TELEPHONE ENCOUNTER
Calling [facility] to verify coverage / eligibility for patient. Spoke to Megan see message put in [place] below:    4/8/19 spoke to [facility], could not find any active Medicare plan with [facility] - she transferred me to [facility] - per [facility] w/[facility], Medicare Plan patient had was a PPO but it is NO [facility] since 4/1/19. Call ref:  Vinita Engle phone number is [phone] - [facility].

## 2019-04-08 NOTE — PROGRESS NOTES
[facility]   (previously: At Home)  [phone] [address] [facility], [address] [place] [address], [address] Zach Lomax    Name:  Adrianna Villarreal  YOB: 1970    Received incoming calls from Harris New [phone] and Ronald Cooper [phone], Care Managers at [facility] inquiring about [facility] for Cherelle De Leon. Harris New says pt no longer has [employer] which does not cover [facility] (see this nurse's note from 3/19/19). Pt now has Medicare A & B which would cover [facility]. Pt was also on the wait list for [facility] as of 3/19/19. This nurse asked Janice New in our office to recheck pt's insurance. Alton Bai called and as of 4/1/19, pt's [employer] is inactive and pt has Medicare A & B. This nurse called [facility]. They are going to check to see where pt is on their wait list and will call Harris New CM back to let her know. Pt has been on the [facility] wait list for about a month and they told pt it would be a 1-2 month wait. This nurse called Sofia Johnson back to let her know that [facility] can see patient. Nurse then called Ms. Cayden Beltre to ask the intake questions for [facility]. Ms. Cayden Beltre verified that she initiated the change in her insurance from [facility] to Medicare B. She changed her insurance so [facility] could begin seeing her again. Pt says she called [facility] this morning and they are planning on seeing her \"in about a week\". Nurse explained [facility] services. Pt says since she has already called [facility], she will stay with them for now. Nurse advised pt to call us back if [facility] doesn't work out and [facility] can see her (but Medicare won't cover both [facility] and [facility]). Pt voices understanding. Nurse called Harris New and Ronald Cooper back to relay above information about pt calling [facility] to arrange follow up. No answer, LM.     John Skaggs RN  Referral Navigator, [facility]

## 2019-04-08 NOTE — TELEPHONE ENCOUNTER
Jarek stated that he would like to know how he can get this patient in the [facility].

## 2019-04-08 NOTE — PROGRESS NOTES
Problem: Pressure Injury - Risk of 
Goal: *Prevention of pressure injury Description Document Jeffrey Scale and appropriate interventions in the flowsheet. Outcome: Progressing Towards Goal 
  
Problem: Patient Education: Go to Patient Education Activity Goal: Patient/Family Education Outcome: Progressing Towards Goal

## 2019-04-08 NOTE — FACE TO FACE
Home Health Care Discharge Planning: [facility] Face to Face Encounter NAME: Hilaria Trinidad :  1970 MRN:  [id number] Primary Diagnosis: hypoxemic and hypercarbic resp failure, obesity, diastolic CHF Date of Face to Face:  2019 11:34 AM        
                        
Face to Face Encounter findings are related to primary reason for home care:   YES 
 
1. I certify that the patient needs intermittent skilled nursing care, physical therapy and/or speech therapy. I will not be following this patient in the Community and Dr. Dela Cruz will be responsible for signing the [address] of Care. 2. Initial Orders for Care: [address] 3. I certify that this patient is homebound because of illness or injury, need the aid of supportive devices such as crutches, canes, wheelchairs, and walkers; the use of special transportation; or the assistance of another person in order to leave their place of residence. There exists a normal inability to leave home and leaving home requires a considerable and taxing effort. 4. I certify that this patient is under my care and that I had a Face-to-Face Encounter that meets the physician Face-to-Face Encounter requirements. Document the physical findings from the Face-to-Face Encounter that support the need for skilled services: Has wound that requires skilled nursing assessment and treatment , Has new medications that requires skilled nursing teaching and monitoring for understanding and compliance  and social work eval for home situation Lisa Richey MD 
Discharging Physician Office: [phone] Fax:   [phone]

## 2019-04-09 LAB
BACTERIA SPEC CULT: NORMAL
SERVICE CMNT-IMP: NORMAL

## 2019-06-24 ENCOUNTER — HOSPITAL ENCOUNTER (EMERGENCY)
Age: 49
Discharge: HOME | End: 2019-06-25
Attending: EMERGENCY MEDICINE | Admitting: EMERGENCY MEDICINE
Payer: MEDICARE

## 2019-06-24 ENCOUNTER — APPOINTMENT (OUTPATIENT)
Dept: GENERAL RADIOLOGY | Age: 49
End: 2019-06-24
Attending: EMERGENCY MEDICINE
Payer: MEDICARE

## 2019-06-24 DIAGNOSIS — K21.9 GASTROESOPHAGEAL REFLUX DISEASE WITHOUT ESOPHAGITIS: ICD-10-CM

## 2019-06-24 DIAGNOSIS — E11.628 DIABETIC FOOT INFECTION (HCC): Primary | ICD-10-CM

## 2019-06-24 DIAGNOSIS — R07.9 CHEST PAIN, UNSPECIFIED TYPE: ICD-10-CM

## 2019-06-24 DIAGNOSIS — L08.9 DIABETIC FOOT INFECTION (HCC): Primary | ICD-10-CM

## 2019-06-24 LAB
ALBUMIN SERPL-MCNC: 3.3 G/DL (ref 3.5–5)
ALBUMIN/GLOB SERPL: 0.6 {RATIO} (ref 1.1–2.2)
ALP SERPL-CCNC: 96 U/L (ref 45–117)
ALT SERPL-CCNC: 19 U/L (ref 12–78)
ANION GAP SERPL CALC-SCNC: 2 MMOL/L (ref 5–15)
AST SERPL-CCNC: 15 U/L (ref 15–37)
BASOPHILS # BLD: 0 K/UL (ref 0–0.1)
BASOPHILS NFR BLD: 0 % (ref 0–1)
BILIRUB SERPL-MCNC: 0.3 MG/DL (ref 0.2–1)
BUN SERPL-MCNC: 15 MG/DL (ref 6–20)
BUN/CREAT SERPL: 19 (ref 12–20)
CALCIUM SERPL-MCNC: 10.4 MG/DL (ref 8.5–10.1)
CHLORIDE SERPL-SCNC: 101 MMOL/L (ref 97–108)
CO2 SERPL-SCNC: 36 MMOL/L (ref 21–32)
CREAT SERPL-MCNC: 0.78 MG/DL (ref 0.55–1.02)
DIFFERENTIAL METHOD BLD: ABNORMAL
EOSINOPHIL # BLD: 0.4 K/UL (ref 0–0.4)
EOSINOPHIL NFR BLD: 4 % (ref 0–7)
ERYTHROCYTE [DISTWIDTH] IN BLOOD BY AUTOMATED COUNT: 12.9 % (ref 11.5–14.5)
GLOBULIN SER CALC-MCNC: 5.4 G/DL (ref 2–4)
GLUCOSE SERPL-MCNC: 89 MG/DL (ref 65–100)
HCT VFR BLD AUTO: 43.2 % (ref 35–47)
HGB BLD-MCNC: 12.7 G/DL (ref 11.5–16)
IMM GRANULOCYTES # BLD AUTO: 0 K/UL (ref 0–0.04)
IMM GRANULOCYTES NFR BLD AUTO: 0 % (ref 0–0.5)
LYMPHOCYTES # BLD: 1 K/UL (ref 0.8–3.5)
LYMPHOCYTES NFR BLD: 10 % (ref 12–49)
MCH RBC QN AUTO: 27.5 PG (ref 26–34)
MCHC RBC AUTO-ENTMCNC: 29.4 G/DL (ref 30–36.5)
MCV RBC AUTO: 93.7 FL (ref 80–99)
MONOCYTES # BLD: 0.6 K/UL (ref 0–1)
MONOCYTES NFR BLD: 6 % (ref 5–13)
NEUTS SEG # BLD: 7.9 K/UL (ref 1.8–8)
NEUTS SEG NFR BLD: 80 % (ref 32–75)
NRBC # BLD: 0 K/UL (ref 0–0.01)
NRBC BLD-RTO: 0 PER 100 WBC
PLATELET # BLD AUTO: 167 K/UL (ref 150–400)
PMV BLD AUTO: 10.9 FL (ref 8.9–12.9)
POTASSIUM SERPL-SCNC: 4 MMOL/L (ref 3.5–5.1)
PROT SERPL-MCNC: 8.7 G/DL (ref 6.4–8.2)
RBC # BLD AUTO: 4.61 M/UL (ref 3.8–5.2)
SODIUM SERPL-SCNC: 139 MMOL/L (ref 136–145)
TROPONIN I SERPL-MCNC: <0.05 NG/ML
WBC # BLD AUTO: 9.9 K/UL (ref 3.6–11)

## 2019-06-24 PROCEDURE — 85025 COMPLETE CBC W/AUTO DIFF WBC: CPT

## 2019-06-24 PROCEDURE — 80053 COMPREHEN METABOLIC PANEL: CPT

## 2019-06-24 PROCEDURE — 36415 COLL VENOUS BLD VENIPUNCTURE: CPT

## 2019-06-24 PROCEDURE — 84484 ASSAY OF TROPONIN QUANT: CPT

## 2019-06-24 PROCEDURE — 73630 X-RAY EXAM OF FOOT: CPT

## 2019-06-24 PROCEDURE — 99285 EMERGENCY DEPT VISIT HI MDM: CPT

## 2019-06-24 PROCEDURE — 74011000250 HC RX REV CODE- 250: Performed by: EMERGENCY MEDICINE

## 2019-06-24 PROCEDURE — 93005 ELECTROCARDIOGRAM TRACING: CPT

## 2019-06-24 PROCEDURE — 74011250637 HC RX REV CODE- 250/637: Performed by: EMERGENCY MEDICINE

## 2019-06-24 PROCEDURE — 71046 X-RAY EXAM CHEST 2 VIEWS: CPT

## 2019-06-24 RX ORDER — DOXYCYCLINE HYCLATE 100 MG
100 TABLET ORAL 2 TIMES DAILY
Qty: 28 TAB | Refills: 0 | Status: SHIPPED | OUTPATIENT
Start: 2019-06-24 | End: 2019-07-08

## 2019-06-24 RX ORDER — PERMETHRIN 50 MG/G
CREAM TOPICAL
Status: DISCONTINUED | OUTPATIENT
Start: 2019-06-24 | End: 2019-06-25 | Stop reason: HOSPADM

## 2019-06-24 RX ORDER — CEPHALEXIN 500 MG/1
500 CAPSULE ORAL 2 TIMES DAILY
Qty: 28 CAP | Refills: 0 | Status: SHIPPED | OUTPATIENT
Start: 2019-06-24 | End: 2019-07-08

## 2019-06-24 RX ORDER — DOXYCYCLINE HYCLATE 100 MG
100 TABLET ORAL
Status: COMPLETED | OUTPATIENT
Start: 2019-06-24 | End: 2019-06-24

## 2019-06-24 RX ORDER — CEPHALEXIN 250 MG/1
500 CAPSULE ORAL
Status: COMPLETED | OUTPATIENT
Start: 2019-06-24 | End: 2019-06-24

## 2019-06-24 RX ADMIN — DOXYCYCLINE HYCLATE 100 MG: 100 TABLET, COATED ORAL at 21:35

## 2019-06-24 RX ADMIN — CEPHALEXIN 500 MG: 250 CAPSULE ORAL at 21:35

## 2019-06-24 RX ADMIN — LIDOCAINE HYDROCHLORIDE 40 ML: 20 SOLUTION ORAL; TOPICAL at 19:04

## 2019-06-24 NOTE — ED TRIAGE NOTES
Onset of midsternal chest pain approx 1PM today. Pain midsternal area and under left breast. + nausea. Pain currently 5/10. Patient also states thinks has infectino to bilateral feet as has wounds to plantar aspect and lateral aspect right foot and lateral aspect left foot. Patient also with visible bedbug s per EMS along with  Bedbug spotted on arrival to ED per EMS.   Taken to decontamination room and showered and Permethrin applied

## 2019-06-25 VITALS
RESPIRATION RATE: 17 BRPM | SYSTOLIC BLOOD PRESSURE: 122 MMHG | DIASTOLIC BLOOD PRESSURE: 95 MMHG | BODY MASS INDEX: 45.99 KG/M2 | HEART RATE: 88 BPM | HEIGHT: 67 IN | TEMPERATURE: 98 F | WEIGHT: 293 LBS | OXYGEN SATURATION: 97 %

## 2019-06-25 LAB
ATRIAL RATE: 77 BPM
CALCULATED P AXIS, ECG09: 35 DEGREES
CALCULATED R AXIS, ECG10: 76 DEGREES
CALCULATED T AXIS, ECG11: 36 DEGREES
DIAGNOSIS, 93000: NORMAL
P-R INTERVAL, ECG05: 198 MS
Q-T INTERVAL, ECG07: 374 MS
QRS DURATION, ECG06: 86 MS
QTC CALCULATION (BEZET), ECG08: 423 MS
VENTRICULAR RATE, ECG03: 77 BPM

## 2019-06-25 RX ORDER — MAG HYDROX/ALUMINUM HYD/SIMETH 200-200-20
30 SUSPENSION, ORAL (FINAL DOSE FORM) ORAL
Qty: 354 ML | Refills: 0 | Status: SHIPPED | OUTPATIENT
Start: 2019-06-25 | End: 2019-12-07

## 2019-06-25 NOTE — ED NOTES
Pt requesting recliner and food. Unable to locate a recliner. Brought pt crackers and peanut butter. Pt states, \"what's that? Don't yall have any food? Like a boxed lunch? \" Pt also requesting 2 pillows \"to be able to sit in this chair. \" Pt extremely ungrateful for everything done for her, will not say please or thank you. Very demanding. Made pt aware she would be waiting for EMS to come to transport her back to her home. She states, \"tell them to hurry up. \" Made pt aware that the wait could be between  minutes depending on how busy they are at the moment.

## 2019-06-25 NOTE — ED NOTES
[facility] ambulance service to transport pt back home. States wait time would be 1.5 hours but they will not be able to accommodate her weight. Will attempt to call [address] transport.

## 2019-06-25 NOTE — DISCHARGE INSTRUCTIONS
Patient Education        Chest Pain: Care Instructions  Your Care Instructions    There are many things that can cause chest pain. Some are not serious and will get better on their own in a few days. But some kinds of chest pain need more testing and treatment. Your doctor may have recommended a follow-up visit in the next 8 to 12 hours. If you are not getting better, you may need more tests or treatment. Even though your doctor has released you, you still need to watch for any problems. The doctor carefully checked you, but sometimes problems can develop later. If you have new symptoms or if your symptoms do not get better, get medical care right away. If you have worse or different chest pain or pressure that lasts more than 5 minutes or you passed out (lost consciousness), call 911 or seek other emergency help right away. A medical visit is only one step in your treatment. Even if you feel better, you still need to do what your doctor recommends, such as going to all suggested follow-up appointments and taking medicines exactly as directed. This will help you recover and help prevent future problems. How can you care for yourself at home? · Rest until you feel better. · Take your medicine exactly as prescribed. Call your doctor if you think you are having a problem with your medicine. · Do not drive after taking a prescription pain medicine. When should you call for help? Call 911 if:    · You passed out (lost consciousness).     · You have severe difficulty breathing.     · You have symptoms of a heart attack. These may include:  ? Chest pain or pressure, or a strange feeling in your chest.  ? Sweating. ? Shortness of breath. ? Nausea or vomiting. ? Pain, pressure, or a strange feeling in your back, neck, jaw, or upper belly or in one or both shoulders or arms. ? Lightheadedness or sudden weakness. ? A fast or irregular heartbeat.   After you call 911, the  may tell you to chew 1 adult-strength or 2 to 4 low-dose aspirin. Wait for an ambulance. Do not try to drive yourself.    Call your doctor today if:    · You have any trouble breathing.     · Your chest pain gets worse.     · You are dizzy or lightheaded, or you feel like you may faint.     · You are not getting better as expected.     · You are having new or different chest pain. Where can you learn more? Go to http://cassia-donte.info/. Enter A120 in the search box to learn more about \"Chest Pain: Care Instructions. \"  Current as of: September 23, 2018  Content Version: 11.9  © 7040-8164 [employer]. Care instructions adapted under license by [employer] (which disclaims liability or warranty for this information). If you have questions about a medical condition or this instruction, always ask your healthcare professional. [place] [address] any warranty or liability for your use of this information. Patient Education        Diabetes Foot Health: Care Instructions  Your Care Instructions    When you have diabetes, your feet need extra care and attention. Diabetes can damage the nerve endings and blood vessels in your feet, making you less likely to notice when your feet are injured. Diabetes also limits your body's ability to fight infection and get blood to areas that need it. If you get a minor foot injury, it could become an ulcer or a serious infection. With good foot care, you can prevent most of these problems. Caring for your feet can be quick and easy. Most of the care can be done when you are bathing or getting ready for bed. Follow-up care is a key part of your treatment and safety. Be sure to make and go to all appointments, and call your doctor if you are having problems. It's also a good idea to know your test results and keep a list of the medicines you take. How can you care for yourself at home?   · Keep your blood sugar close to normal by watching what and how much you eat, monitoring blood sugar, taking medicines if prescribed, and getting regular exercise. · Do not smoke. Smoking affects blood flow and can make foot problems worse. If you need help quitting, talk to your doctor about stop-smoking programs and medicines. These can increase your chances of quitting for good. · Eat a diet that is low in fats. High fat intake can cause fat to build up in your blood vessels and decrease blood flow. · Inspect your feet daily for blisters, cuts, cracks, or sores. If you cannot see well, use a mirror or have someone help you. · Take care of your feet:  ? Wash your feet every day. Use warm (not hot) water. Check the water temperature with your wrists or other part of your body, not your feet. ? Dry your feet well. Pat them dry. Do not rub the skin on your feet too hard. Dry well between your toes. If the skin on your feet stays moist, bacteria or a fungus can grow, which can lead to infection. ? Keep your skin soft. Use moisturizing skin cream to keep the skin on your feet soft and prevent calluses and cracks. But do not put the cream between your toes, and stop using any cream that causes a rash. ? Clean underneath your toenails carefully. Do not use a sharp object to clean underneath your toenails. Use the blunt end of a nail file or other rounded tool. ? Trim and file your toenails straight across to prevent ingrown toenails. Use a nail clipper, not scissors. Use an emery board to smooth the edges. · Change socks daily. Socks without seams are best, because seams often rub the feet. You can find socks for people with diabetes from specialty catalogs. · Look inside your shoes every day for things like gravel or torn linings, which could cause blisters or sores. · Buy shoes that fit well:  ? Look for shoes that have plenty of space around the toes. This helps prevent bunions and blisters.   ? Try on shoes while wearing the kind of socks you will usually wear with the shoes. ? Avoid plastic shoes. They may rub your feet and cause blisters. Good shoes should be made of materials that are flexible and breathable, such as leather or cloth. ? Break in new shoes slowly by wearing them for no more than an hour a day for several days. Take extra time to check your feet for red areas, blisters, or other problems after you wear new shoes. · Do not go barefoot. Do not wear sandals, and do not wear shoes with very thin soles. Thin soles are easy to puncture. They also do not protect your feet from hot pavement or cold weather. · Have your doctor check your feet during each visit. If you have a foot problem, see your doctor. Do not try to treat an early foot problem at home. Home remedies or treatments that you can buy without a prescription (such as corn removers) can be harmful. · Always get early treatment for foot problems. A minor irritation can lead to a major problem if not properly cared for early. When should you call for help? Call your doctor now or seek immediate medical care if:    · You have a foot sore, an ulcer or break in the skin that is not healing after 4 days, bleeding corns or calluses, or an ingrown toenail.     · You have blue or black areas, which can mean bruising or blood flow problems.     · You have peeling skin or tiny blisters between your toes or cracking or oozing of the skin.     · You have a fever for more than 24 hours and a foot sore.     · You have new numbness or tingling in your feet that does not go away after you move your feet or change positions.     · You have unexplained or unusual swelling of the foot or ankle.    Watch closely for changes in your health, and be sure to contact your doctor if:    · You cannot do proper foot care. Where can you learn more? Go to http://cassia-donte.info/. Enter W398 in the search box to learn more about \"Diabetes Foot Health: Care Instructions. \"  Current as of: July 25, 2018  Content Version: 11.9  © 4470-4093 [employer]. Care instructions adapted under license by [employer] (which disclaims liability or warranty for this information). If you have questions about a medical condition or this instruction, always ask your healthcare professional. [address] any warranty or liability for your use of this information. Patient Education        Diabetic Foot Ulcer: Care Instructions  Your Care Instructions  Diabetes can damage the nerve endings and blood vessels in your feet. That means you are less likely to notice when your feet are injured. A small skin problem like a callus, blister, or cracked skin can turn into a larger sore, called a foot ulcer. Foot ulcers form most often on the pad (ball) of the foot or the bottom of the big toe. You can also get them on the top and bottom of each toe. Foot ulcers can get infected. If the infection is severe, then tissue in the foot can die. This is called gangrene. In that case, one or more of the toes, part or all of the foot, and sometimes part of the leg may have to be removed (amputated). Your doctor may have removed the dead tissue and cleaned the ulcer. Your foot wound may be wrapped in a protective bandage. It is very important to keep your weight off your injured foot. After a foot ulcer has formed, it will not heal as long as you keep putting weight on the area. Always get early treatment for foot problems. A minor irritation can lead to a major problem if it's not taken care of soon. Follow-up care is a key part of your treatment and safety. Be sure to make and go to all appointments, and call your doctor if you are having problems. It's also a good idea to know your test results and keep a list of the medicines you take. How can you care for yourself at home? · Follow your doctor's instructions about keeping pressure off the foot ulcer.  You may need to use crutches or a wheelchair. Or you may wear a cast or a walking boot. · Follow your doctor's instructions on how to clean the ulcer and change the bandage. · If your doctor prescribed antibiotics, take them as directed. Do not stop taking them just because you feel better. You need to take the full course of antibiotics. To prevent foot ulcers  · Keep your blood sugar close to normal by watching what and how much you eat. Track your blood sugar, take medicines if prescribed, and get regular exercise. · Do not smoke. Smoking affects blood flow and can make foot problems worse. If you need help quitting, talk to your doctor about stop-smoking programs and medicines. These can increase your chances of quitting for good. · Do not go barefoot. Protect your feet by wearing shoes that fit well. Choose shoes that are made of materials that are flexible and breathable, such as leather or cloth. · Inspect your feet daily for blisters, cuts, cracks, or sores. If you can't see well, use a mirror or have someone help you. · Have your doctor check your feet during each visit. If you have a foot problem, see your doctor. Do not try to treat your foot problem on your own. Home remedies or treatments that you can buy without a prescription (such as corn removers) can be harmful. When should you call for help? Call your doctor now or seek immediate medical care if:    · You have symptoms of infection, such as:  ? Increased pain, swelling, warmth, or redness. ? Red streaks leading from the area. ? Pus draining from the area. ? A fever.    Watch closely for changes in your health, and be sure to contact your doctor if:    · You have a new problem with your feet, such as:  ? A new sore or ulcer. ? A break in the skin that is not healing after several days. ? Bleeding corns or calluses. ? An ingrown toenail.     · You do not get better as expected. Where can you learn more? Go to http://cassia-donte.info/.   Enter T131 in the search box to learn more about \"Diabetic Foot Ulcer: Care Instructions. \"  Current as of: July 25, 2018  Content Version: 11.9  © 5480-0016 [employer] [place]. Care instructions adapted under license by [employer] (which disclaims liability or warranty for this information). If you have questions about a medical condition or this instruction, always ask your healthcare professional. [place] [address] any warranty or liability for your use of this information.

## 2019-06-27 NOTE — ED PROVIDER NOTES
EMERGENCY DEPARTMENT HISTORY AND PHYSICAL EXAM      Date: 6/24/2019  Patient Name: Adrianna Villarreal    History of Presenting Illness     Chief Complaint   Patient presents with    Chest Pain       History Provided By: Patient    HPI: Adrianna Villarreal, 54 y.o. female with PMHx as noted below presents the emergency department with multiple complaints. Comfort. Patient notes that at approximately 12 PM developed midsternal burning sensation that felt like indigestion. Patient notes that she has been belching repetitively since onset but has not taken any medications. With this, there is no nausea, vomiting, diaphoresis, radiation of symptoms or dyspnea. Patient also complaining of a ulceration on the bottom of her right foot that is been present now for approximately 1 week. Patient notes she is concerned that this area may becoming infected. Patient is nonambulatory at baseline but notes no fevers, chills, worsening swelling of the extremity or purulent drainage. PCP: None    Current Outpatient Medications   Medication Sig Dispense Refill    alum-mag hydroxide-simeth (MAALOX ADVANCED) 200-200-20 mg/5 mL susp Take 30 mL by mouth every four (4) hours as needed for Pain. 354 mL 0    cephALEXin (KEFLEX) 500 mg capsule Take 1 Cap by mouth two (2) times a day for 14 days. 28 Cap 0    doxycycline (VIBRA-TABS) 100 mg tablet Take 1 Tab by mouth two (2) times a day for 14 days. 28 Tab 0    ranolazine ER (RANEXA) 500 mg SR tablet Take 1 Tab by mouth two (2) times a day. 60 Tab 8    cyclobenzaprine (FLEXERIL) 5 mg tablet Take 1 Tab by mouth two (2) times daily as needed for Muscle Spasm(s). flexeriol 5 Tab 0    albuterol-ipratropium (DUO-NEB) 2.5 mg-0.5 mg/3 ml nebu 3 mL by Nebulization route four (4) times daily. 100 Nebule 1    furosemide (LASIX) 40 mg tablet Take 40 mg by mouth two (2) times a day.  nystatin (MYCOSTATIN) topical cream Apply  to affected area two (2) times a day.       nystatin (MYCOSTATIN) powder Apply  to affected area two (2) times a day.  fluticasone (FLONASE) 50 mcg/actuation nasal spray 2 Sprays by Both Nostrils route daily. 1 Bottle 0    insulin glargine (LANTUS) 100 unit/mL injection 45 Units by SubCUTAneous route daily.  metoprolol tartrate (LOPRESSOR) 25 mg tablet Take 25 mg by mouth two (2) times a day.  fluticasone-salmeterol (ADVAIR DISKUS) 500-50 mcg/dose diskus inhaler Take 1 Puff by inhalation every twelve (12) hours.  loratadine (CLARITIN) 10 mg tablet Take 10 mg by mouth daily.  albuterol (VENTOLIN HFA) 90 mcg/actuation inhaler Take 2 Puffs by inhalation every six (6) hours as needed for Wheezing.  aspirin 81 mg chewable tablet Take 81 mg by mouth daily.  hydrOXYzine pamoate (VISTARIL) 50 mg capsule Take 50 mg by mouth every eight (8) hours as needed for Itching.  lovastatin (MEVACOR) 40 mg tablet Take 1 Tab by mouth nightly. 30 Tab 6    glyBURIDE (DIABETA) 5 mg tablet Take 5 mg by mouth two (2) times daily (with meals).  ammonium lactate (LAC-HYDRIN) 12 % topical cream rub in to affected area well 280 g 1    nitroglycerin (NITROSTAT) 0.4 mg SL tablet 1 Tab by SubLINGual route every five (5) minutes as needed for Chest Pain (call 911 if not relieved by 3).  25 Tab 2       Past History     Past Medical History:  Past Medical History:   Diagnosis Date    Arthritis     Asthma     Breast lump     CAD (coronary artery disease)     Congestive heart failure (HCC)     COPD (chronic obstructive pulmonary disease) (HCC)     Diabetes [facility]     Hypertension     Morbid obesity with BMI of 70 and over, adult ([facility]     NSTEMI (non-ST elevated myocardial infarction) ([facility]     SVT (supraventricular tachycardia) ([facility]        Past Surgical History:  Past Surgical History:   Procedure Laterality Date    CARDIAC SURG PROCEDURE UNLIST      Stents    HX  SECTION      HX ORTHOPAEDIC      HX OTHER SURGICAL      cyst removed from back       Family History:  Family History   Problem Relation Age of Onset    Heart Disease Mother     Heart Disease Father     Heart Disease Sister     Breast Cancer Maternal Grandmother     Breast Cancer Paternal Grandmother        Social History:  Social History     Tobacco Use    Smoking status: Former Smoker     Packs/day: 0.25     Types: Cigarettes    Smokeless tobacco: Never Used    Tobacco comment: Patient states \"I  aint smoking no more d*mn cigarettes after yesterday\" 01/26/2018   Substance Use Topics    Alcohol use: No    Drug use: No       Allergies:  No Known Allergies      Review of Systems   Review of Systems  Constitutional: Negative for fever, chills, and fatigue. HENT: Negative for congestion, sore throat, rhinorrhea, sneezing and neck stiffness   Eyes: Negative for discharge and redness. Respiratory: Negative for  shortness of breath, wheezing   Cardiovascular: Positive for chest pain, negative palpitations   Gastrointestinal: Negative for nausea, vomiting, abdominal pain, constipation, diarrhea and blood in stool. Genitourinary: Negative for dysuria, hematuria, flank pain, decreased urine volume, discharge,   Musculoskeletal: Negative for myalgias or joint pain . Skin: Negative for rash or lesions . Positive foot ulcer  Neurological: Negative weakness, light-headedness, numbness and headaches. Physical Exam   Physical Exam    GENERAL: alert and oriented, no acute distress  EYES: PEERL, No injection, discharge or icterus. ENT: Mucous membranes pink and moist.  NECK: Supple  LUNGS: Airway patent. Non-labored respirations. Breath sounds clear with good air entry bilaterally. HEART: Regular rate and rhythm. No peripheral edema  ABDOMEN: Morbidly obese, non-distended and non-tender, without guarding or rebound. SKIN:  warm, dry  MSK/EXTREMITIES: Chronic venous stasis changes legs are edematous bilaterally.   On the base of the right foot there is a small surrounding erythema, no fluctuance crepitus or purulence noted. NEUROLOGICAL: Alert, oriented      Diagnostic Study Results     Labs -   reviewed    Radiologic Studies -   XR CHEST PA LAT   Final Result   IMPRESSION: Unchanged cardiomegaly. No acute pulmonary process. XR FOOT RT MIN 3 V   Final Result   IMPRESSION: No acute osseous or articular abnormality. Diffuse soft tissue   swelling. CT Results  (Last 48 hours)    None        CXR Results  (Last 48 hours)    None            Medical Decision Making   I am the first provider for this patient. I reviewed the vital signs, available nursing notes, past medical history, past surgical history, family history and social history. Vital Signs-Reviewed the patient's vital signs. EKG interpretation: (Preliminary)  Rhythm: normal sinus rhythm; and regular . Rate (approx.): 77; Axis: normal; P wave: normal; QRS interval: normal ; ST/T wave: normal;  was interpreted by Shaila Quiros MD,ED Provider. Records Reviewed: Nursing Notes and Old Medical Records    Provider Notes (Medical Decision Making): On presentation, the patient is well appearing, in no acute distress with normal vital signs. With regards to the chest discomfort, differential included gastritis, peptic ulcer disease, ACS. Patient's symptoms are relatively mild so lower suspicion for pulmonary embolism or dissection. Patient treated with GI cocktail with near resolution in her symptoms recommended, home antacid use. With regards to the wound on the foot, considered infected diabetic foot ulcer, cellulitis, osteomyelitis, abscess. Work-up and exam is most consistent with a mildly infected diabetic foot ulcer. No signs of significant deep base infection or osteomyelitis. Patient exhibiting no symptoms of systemic infection. Will treat with course of outpatient antibiotics. I stressed the importance of follow-up with podiatry within the week.     ED Course:   Initial assessment performed. The patients presenting problems have been discussed, and they are in agreement with the care plan formulated and outlined with them. I have encouraged them to ask questions as they arise throughout their visit. PROGRESS NOTE:  The patient has been re-evaluated and is ready for discharge. Reviewed available results with patient and have counseled them on diagnosis and care plan. They have expressed understanding, and all their questions have been answered. They agree with plan and agree to have pt F/U as recommended, or return to the ED if their sxs worsen. Discharge instructions have been provided and explained to them, along with reasons to have pt return to the ED. The patient is amenable to discharge so will discharge pt at this time    Falguni Caputo MD        Disposition:  home    PLAN:  1. Discharge Medication List as of 6/24/2019  9:13 PM      START taking these medications    Details   cephALEXin (KEFLEX) 500 mg capsule Take 1 Cap by mouth two (2) times a day for 14 days. , Normal, Disp-28 Cap, R-0      doxycycline (VIBRA-TABS) 100 mg tablet Take 1 Tab by mouth two (2) times a day for 14 days. , Normal, Disp-28 Tab, R-0         CONTINUE these medications which have NOT CHANGED    Details   ranolazine ER (RANEXA) 500 mg SR tablet Take 1 Tab by mouth two (2) times a day., Normal, Disp-60 Tab, R-8      cyclobenzaprine (FLEXERIL) 5 mg tablet Take 1 Tab by mouth two (2) times daily as needed for Muscle Spasm(s). flexeriol, Print, Disp-5 Tab, R-0      albuterol-ipratropium (DUO-NEB) 2.5 mg-0.5 mg/3 ml nebu 3 mL by Nebulization route four (4) times daily. , Normal, Disp-100 Nebule, R-1      furosemide (LASIX) 40 mg tablet Take 40 mg by mouth two (2) times a day., Historical Med      nystatin (MYCOSTATIN) topical cream Apply  to affected area two (2) times a day., Historical Med      nystatin (MYCOSTATIN) powder Apply  to affected area two (2) times a day., Historical Med      fluticasone (FLONASE) 50 mcg/actuation nasal spray 2 Sprays by Both Nostrils route daily. , Print, Disp-1 Bottle, R-0      insulin glargine (LANTUS) 100 unit/mL injection 45 Units by SubCUTAneous route daily. , Historical Med      metoprolol tartrate (LOPRESSOR) 25 mg tablet Take 25 mg by mouth two (2) times a day., Historical Med      fluticasone-salmeterol (ADVAIR DISKUS) 500-50 mcg/dose diskus inhaler Take 1 Puff by inhalation every twelve (12) hours. , Historical Med      loratadine (CLARITIN) 10 mg tablet Take 10 mg by mouth daily. , Historical Med      albuterol (VENTOLIN HFA) 90 mcg/actuation inhaler Take 2 Puffs by inhalation every six (6) hours as needed for Wheezing., Historical Med      aspirin 81 mg chewable tablet Take 81 mg by mouth daily. , Historical Med      hydrOXYzine pamoate (VISTARIL) 50 mg capsule Take 50 mg by mouth every eight (8) hours as needed for Itching., Historical Med      lovastatin (MEVACOR) 40 mg tablet Take 1 Tab by mouth nightly., Normal, Disp-30 Tab, R-6      glyBURIDE (DIABETA) 5 mg tablet Take 5 mg by mouth two (2) times daily (with meals). , Historical Med      ammonium lactate (LAC-HYDRIN) 12 % topical cream rub in to affected area well, Print, Disp-280 g, R-1      nitroglycerin (NITROSTAT) 0.4 mg SL tablet 1 Tab by SubLINGual route every five (5) minutes as needed for Chest Pain (call 911 if not relieved by 3). , Normal, Disp-25 Tab, R-2           2.    Follow-up Information     Follow up With Specialties Details Why Contact Info    [facility] EMERGENCY DEPT Emergency Medicine  If symptoms worsen [address] [address]  [phone]    Eric  KWAME  [facility] Schedule an appointment as soon as possible for a visit in 1 day  [address] [phone]      Follow-up with her primary care physician this week        [address] Cardiology Schedule an appointment as soon as possible for a visit in 2 days  [phone] [address]  [phone]        Return to ED if worse     Diagnosis     Clinical Impression:   1. Diabetic foot infection ([facility] [address]    2. Chest pain, unspecified type    3.  Gastroesophageal reflux disease without esophagitis

## 2019-08-13 ENCOUNTER — APPOINTMENT (OUTPATIENT)
Dept: GENERAL RADIOLOGY | Age: 49
End: 2019-08-13
Attending: EMERGENCY MEDICINE
Payer: MEDICARE

## 2019-08-13 ENCOUNTER — HOSPITAL ENCOUNTER (EMERGENCY)
Age: 49
Discharge: HOME | End: 2019-08-13
Attending: EMERGENCY MEDICINE
Payer: MEDICARE

## 2019-08-13 VITALS
TEMPERATURE: 98.7 F | RESPIRATION RATE: 20 BRPM | DIASTOLIC BLOOD PRESSURE: 73 MMHG | WEIGHT: 293 LBS | HEIGHT: 67 IN | HEART RATE: 89 BPM | OXYGEN SATURATION: 93 % | BODY MASS INDEX: 45.99 KG/M2 | SYSTOLIC BLOOD PRESSURE: 143 MMHG

## 2019-08-13 DIAGNOSIS — J44.1 COPD EXACERBATION (HCC): ICD-10-CM

## 2019-08-13 DIAGNOSIS — R06.02 SOB (SHORTNESS OF BREATH): Primary | ICD-10-CM

## 2019-08-13 DIAGNOSIS — R06.89 HYPERCAPNIA: ICD-10-CM

## 2019-08-13 LAB
ALBUMIN SERPL-MCNC: 3.2 G/DL (ref 3.5–5)
ALBUMIN/GLOB SERPL: 0.6 {RATIO} (ref 1.1–2.2)
ALP SERPL-CCNC: 89 U/L (ref 45–117)
ALT SERPL-CCNC: 17 U/L (ref 12–78)
ANION GAP SERPL CALC-SCNC: 4 MMOL/L (ref 5–15)
ARTERIAL PATENCY WRIST A: YES
AST SERPL-CCNC: 14 U/L (ref 15–37)
ATRIAL RATE: 90 BPM
BASE EXCESS BLD CALC-SCNC: 7 MMOL/L
BASOPHILS # BLD: 0 K/UL (ref 0–0.1)
BASOPHILS NFR BLD: 0 % (ref 0–1)
BDY SITE: ABNORMAL
BILIRUB SERPL-MCNC: 0.4 MG/DL (ref 0.2–1)
BNP SERPL-MCNC: 323 PG/ML
BUN SERPL-MCNC: 16 MG/DL (ref 6–20)
BUN/CREAT SERPL: 21 (ref 12–20)
CALCIUM SERPL-MCNC: 10.1 MG/DL (ref 8.5–10.1)
CALCULATED P AXIS, ECG09: 45 DEGREES
CALCULATED R AXIS, ECG10: 80 DEGREES
CALCULATED T AXIS, ECG11: 40 DEGREES
CHLORIDE SERPL-SCNC: 101 MMOL/L (ref 97–108)
CK SERPL-CCNC: 29 U/L (ref 26–192)
CO2 SERPL-SCNC: 33 MMOL/L (ref 21–32)
CREAT SERPL-MCNC: 0.76 MG/DL (ref 0.55–1.02)
DIAGNOSIS, 93000: NORMAL
DIFFERENTIAL METHOD BLD: ABNORMAL
EOSINOPHIL # BLD: 0.2 K/UL (ref 0–0.4)
EOSINOPHIL NFR BLD: 2 % (ref 0–7)
ERYTHROCYTE [DISTWIDTH] IN BLOOD BY AUTOMATED COUNT: 13.4 % (ref 11.5–14.5)
GAS FLOW.O2 O2 DELIVERY SYS: ABNORMAL L/MIN
GAS FLOW.O2 SETTING OXYMISER: 4 L/M
GLOBULIN SER CALC-MCNC: 5.1 G/DL (ref 2–4)
GLUCOSE SERPL-MCNC: 119 MG/DL (ref 65–100)
HCO3 BLD-SCNC: 32.3 MMOL/L (ref 22–26)
HCT VFR BLD AUTO: 43.2 % (ref 35–47)
HGB BLD-MCNC: 12.4 G/DL (ref 11.5–16)
IMM GRANULOCYTES # BLD AUTO: 0 K/UL (ref 0–0.04)
IMM GRANULOCYTES NFR BLD AUTO: 0 % (ref 0–0.5)
LYMPHOCYTES # BLD: 1 K/UL (ref 0.8–3.5)
LYMPHOCYTES NFR BLD: 11 % (ref 12–49)
MCH RBC QN AUTO: 27 PG (ref 26–34)
MCHC RBC AUTO-ENTMCNC: 28.7 G/DL (ref 30–36.5)
MCV RBC AUTO: 93.9 FL (ref 80–99)
MONOCYTES # BLD: 0.7 K/UL (ref 0–1)
MONOCYTES NFR BLD: 7 % (ref 5–13)
NEUTS SEG # BLD: 7.5 K/UL (ref 1.8–8)
NEUTS SEG NFR BLD: 80 % (ref 32–75)
NRBC # BLD: 0 K/UL (ref 0–0.01)
NRBC BLD-RTO: 0 PER 100 WBC
P-R INTERVAL, ECG05: 178 MS
PCO2 BLD: 59.1 MMHG (ref 35–45)
PH BLD: 7.35 [PH] (ref 7.35–7.45)
PLATELET # BLD AUTO: 175 K/UL (ref 150–400)
PMV BLD AUTO: 11.3 FL (ref 8.9–12.9)
PO2 BLD: 66 MMHG (ref 80–100)
POTASSIUM SERPL-SCNC: 4 MMOL/L (ref 3.5–5.1)
PROT SERPL-MCNC: 8.3 G/DL (ref 6.4–8.2)
Q-T INTERVAL, ECG07: 358 MS
QRS DURATION, ECG06: 82 MS
QTC CALCULATION (BEZET), ECG08: 437 MS
RBC # BLD AUTO: 4.6 M/UL (ref 3.8–5.2)
RBC MORPH BLD: ABNORMAL
SAO2 % BLD: 91 % (ref 92–97)
SODIUM SERPL-SCNC: 138 MMOL/L (ref 136–145)
SPECIMEN TYPE: ABNORMAL
TOTAL RESP. RATE, ITRR: 19
TROPONIN I SERPL-MCNC: <0.05 NG/ML
VENTRICULAR RATE, ECG03: 90 BPM
WBC # BLD AUTO: 9.4 K/UL (ref 3.6–11)

## 2019-08-13 PROCEDURE — 93005 ELECTROCARDIOGRAM TRACING: CPT

## 2019-08-13 PROCEDURE — 94640 AIRWAY INHALATION TREATMENT: CPT

## 2019-08-13 PROCEDURE — 36600 WITHDRAWAL OF ARTERIAL BLOOD: CPT

## 2019-08-13 PROCEDURE — 99285 EMERGENCY DEPT VISIT HI MDM: CPT

## 2019-08-13 PROCEDURE — 82803 BLOOD GASES ANY COMBINATION: CPT

## 2019-08-13 PROCEDURE — 74011250636 HC RX REV CODE- 250/636: Performed by: EMERGENCY MEDICINE

## 2019-08-13 PROCEDURE — 84484 ASSAY OF TROPONIN QUANT: CPT

## 2019-08-13 PROCEDURE — 82550 ASSAY OF CK (CPK): CPT

## 2019-08-13 PROCEDURE — 96374 THER/PROPH/DIAG INJ IV PUSH: CPT

## 2019-08-13 PROCEDURE — 74011000250 HC RX REV CODE- 250: Performed by: EMERGENCY MEDICINE

## 2019-08-13 PROCEDURE — 80053 COMPREHEN METABOLIC PANEL: CPT

## 2019-08-13 PROCEDURE — 96375 TX/PRO/DX INJ NEW DRUG ADDON: CPT

## 2019-08-13 PROCEDURE — 74011250637 HC RX REV CODE- 250/637: Performed by: EMERGENCY MEDICINE

## 2019-08-13 PROCEDURE — 85025 COMPLETE CBC W/AUTO DIFF WBC: CPT

## 2019-08-13 PROCEDURE — 36415 COLL VENOUS BLD VENIPUNCTURE: CPT

## 2019-08-13 PROCEDURE — 71045 X-RAY EXAM CHEST 1 VIEW: CPT

## 2019-08-13 PROCEDURE — 83880 ASSAY OF NATRIURETIC PEPTIDE: CPT

## 2019-08-13 RX ORDER — AZITHROMYCIN 250 MG/1
500 TABLET, FILM COATED ORAL
Status: COMPLETED | OUTPATIENT
Start: 2019-08-13 | End: 2019-08-13

## 2019-08-13 RX ORDER — AZITHROMYCIN 250 MG/1
TABLET, FILM COATED ORAL
Qty: 4 TAB | Refills: 0 | Status: SHIPPED | OUTPATIENT
Start: 2019-08-13 | End: 2019-08-18

## 2019-08-13 RX ORDER — SODIUM CHLORIDE 0.9 % (FLUSH) 0.9 %
5-40 SYRINGE (ML) INJECTION AS NEEDED
Status: DISCONTINUED | OUTPATIENT
Start: 2019-08-13 | End: 2019-08-13 | Stop reason: HOSPADM

## 2019-08-13 RX ORDER — IPRATROPIUM BROMIDE AND ALBUTEROL SULFATE 2.5; .5 MG/3ML; MG/3ML
3 SOLUTION RESPIRATORY (INHALATION)
Status: COMPLETED | OUTPATIENT
Start: 2019-08-13 | End: 2019-08-13

## 2019-08-13 RX ORDER — FUROSEMIDE 10 MG/ML
40 INJECTION INTRAMUSCULAR; INTRAVENOUS ONCE
Status: COMPLETED | OUTPATIENT
Start: 2019-08-13 | End: 2019-08-13

## 2019-08-13 RX ORDER — SODIUM CHLORIDE 0.9 % (FLUSH) 0.9 %
5-40 SYRINGE (ML) INJECTION EVERY 8 HOURS
Status: DISCONTINUED | OUTPATIENT
Start: 2019-08-13 | End: 2019-08-13 | Stop reason: HOSPADM

## 2019-08-13 RX ORDER — IPRATROPIUM BROMIDE 0.5 MG/2.5ML
0.5 SOLUTION RESPIRATORY (INHALATION)
Status: COMPLETED | OUTPATIENT
Start: 2019-08-13 | End: 2019-08-13

## 2019-08-13 RX ADMIN — IPRATROPIUM BROMIDE 0.5 MG: 0.5 SOLUTION RESPIRATORY (INHALATION) at 14:45

## 2019-08-13 RX ADMIN — Medication 10 ML: at 14:21

## 2019-08-13 RX ADMIN — AZITHROMYCIN MONOHYDRATE 500 MG: 250 TABLET ORAL at 15:31

## 2019-08-13 RX ADMIN — METHYLPREDNISOLONE SODIUM SUCCINATE 125 MG: 125 INJECTION, POWDER, FOR SOLUTION INTRAMUSCULAR; INTRAVENOUS at 15:32

## 2019-08-13 RX ADMIN — FUROSEMIDE 40 MG: 10 INJECTION, SOLUTION INTRAMUSCULAR; INTRAVENOUS at 15:32

## 2019-08-13 RX ADMIN — IPRATROPIUM BROMIDE AND ALBUTEROL SULFATE 3 ML: .5; 3 SOLUTION RESPIRATORY (INHALATION) at 14:45

## 2019-08-13 NOTE — ED TRIAGE NOTES
[facility] called for transport for pt discharge, per pt request [facility] was informed pt did not want bariatric stretcher due to it not lowering enough for her to stand from it, [facility] will provide appropriate transfer

## 2019-08-13 NOTE — ED NOTES
Dr. Camilo Lantigua at bedside to review discharge paperwork with pt. Awaiting [facility] transportation to take patient back home. Skin warm and dry. Respirations even and unlabored. In no apparent distress at this time.

## 2019-08-13 NOTE — ED NOTES
[facility] at bedside waiting for bariatric stretcher to arrive to take pt home. Skin warm and dry. Respirations even and unlabored. In no apparent distress at this time.

## 2019-08-13 NOTE — ED NOTES
Patient reports that she is less short of breath since breathing treatment completed. Wheezing has diminished and lung sounds are more clear in all lobes bilaterally. Patient is stable, sitting in recliner, and call bell is within reach. Patient specifically requested to sit in recliner and not be put in stretcher d/t comfort.

## 2019-08-13 NOTE — ED NOTES
Pt requesting a large recliner to sit in, unable to locate at this time. Pt requesting a wheelchair to sit in because the bed is uncomfortable. Will attempt to find a wheelchair. RT called for [facility].

## 2019-08-13 NOTE — ED PROVIDER NOTES
EMERGENCY DEPARTMENT HISTORY AND PHYSICAL EXAM      Date: 8/13/2019  Patient Name: Salma Hogan  Patient Age and Sex: 54 y.o. female     History of Presenting Illness     Chief Complaint   Patient presents with    Shortness of Breath     Pt arrives to ER via EMS with c/o SOB since last night. Pt receieved duo-neb tx PTA, pt arrives on nasal cannula 5L, which she is always on at home. History Provided By: Patient     HPI: Salma Hogan Is a 31-year-old female past medical history of COPD, obesity, obstructive sleep apnea presenting today with shortness of breath. She reports that for the past week she has had increased shortness of breath. She describes issues with her air conditioner being broken and she is unable to wear her CPAP mask at night. She also endorses increased shortness of breath on her typical 5 L nasal cannula oxygen. She has a nonproductive cough, denies fevers, and states that she feels that she cannot use her nebulizer because the medicine \"is not getting down to my lungs \". There are no other complaints, changes, or physical findings at this time. PCP: None    No current facility-administered medications on file prior to encounter. Current Outpatient Medications on File Prior to Encounter   Medication Sig Dispense Refill    alum-mag hydroxide-simeth (MAALOX ADVANCED) 200-200-20 mg/5 mL susp Take 30 mL by mouth every four (4) hours as needed for Pain. 354 mL 0    ranolazine ER (RANEXA) 500 mg SR tablet Take 1 Tab by mouth two (2) times a day. 60 Tab 8    cyclobenzaprine (FLEXERIL) 5 mg tablet Take 1 Tab by mouth two (2) times daily as needed for Muscle Spasm(s). flexeriol 5 Tab 0    albuterol-ipratropium (DUO-NEB) 2.5 mg-0.5 mg/3 ml nebu 3 mL by Nebulization route four (4) times daily. 100 Nebule 1    furosemide (LASIX) 40 mg tablet Take 40 mg by mouth two (2) times a day.  nystatin (MYCOSTATIN) topical cream Apply  to affected area two (2) times a day.       nystatin (MYCOSTATIN) powder Apply  to affected area two (2) times a day.  fluticasone (FLONASE) 50 mcg/actuation nasal spray 2 Sprays by Both Nostrils route daily. 1 Bottle 0    insulin glargine (LANTUS) 100 unit/mL injection 45 Units by SubCUTAneous route daily.  metoprolol tartrate (LOPRESSOR) 25 mg tablet Take 25 mg by mouth two (2) times a day.  fluticasone-salmeterol (ADVAIR DISKUS) 500-50 mcg/dose diskus inhaler Take 1 Puff by inhalation every twelve (12) hours.  loratadine (CLARITIN) 10 mg tablet Take 10 mg by mouth daily.  albuterol (VENTOLIN HFA) 90 mcg/actuation inhaler Take 2 Puffs by inhalation every six (6) hours as needed for Wheezing.  aspirin 81 mg chewable tablet Take 81 mg by mouth daily.  hydrOXYzine pamoate (VISTARIL) 50 mg capsule Take 50 mg by mouth every eight (8) hours as needed for Itching.  lovastatin (MEVACOR) 40 mg tablet Take 1 Tab by mouth nightly. 30 Tab 6    glyBURIDE (DIABETA) 5 mg tablet Take 5 mg by mouth two (2) times daily (with meals).  ammonium lactate (LAC-HYDRIN) 12 % topical cream rub in to affected area well 280 g 1    nitroglycerin (NITROSTAT) 0.4 mg SL tablet 1 Tab by SubLINGual route every five (5) minutes as needed for Chest Pain (call 911 if not relieved by 3).  25 Tab 2       Past History     Past Medical History:  Past Medical History:   Diagnosis Date    Arthritis     Asthma     Breast lump     CAD (coronary artery disease)     Congestive heart failure (HCC)     COPD (chronic obstructive pulmonary disease) (HCC)     Diabetes ([facility] [address]     Hypertension     Morbid obesity with BMI of 70 and over, adult ([facility] [address]     NSTEMI (non-ST elevated myocardial infarction) ([facility] [address]     SVT (supraventricular tachycardia) ([facility] [address]        Past Surgical History:  Past Surgical History:   Procedure Laterality Date    CARDIAC SURG PROCEDURE UNLIST      Stents    HX  SECTION      HX ORTHOPAEDIC      HX OTHER SURGICAL      cyst removed from back       Family History:  Family History   Problem Relation Age of Onset    Heart Disease Mother     Heart Disease Father     Heart Disease Sister     Breast Cancer Maternal Grandmother     Breast Cancer Paternal Grandmother        Social History:  Social History     Tobacco Use    Smoking status: Former Smoker     Packs/day: 0.25     Types: Cigarettes    Smokeless tobacco: Never Used    Tobacco comment: Patient states \"I  aint smoking no more d*mn cigarettes after yesterday\" 01/26/2018   Substance Use Topics    Alcohol use: No    Drug use: No       Allergies:  No Known Allergies      Review of Systems   Constitutional: No  fever,  No  headache  Skin: No  rash, No  jaundice  HEENT: No  nasal congestion, No  eye drainage. Resp: + cough,  +  wheezing  CV: No chest pain, No  palpitations  GI: No vomiting,  No  diarrhea.,  No  constipation  : No dysuria,  No  hematuria  MSK: No joint pain,  No  trauma  Neuro: No numbness, No  tingling  Psych: No suicidal, No  paranoid      Physical Exam     Patient Vitals for the past 12 hrs:   Temp Pulse Resp BP SpO2   08/13/19 1831  89 20 143/73 93 %   08/13/19 1415 98.7 °F (37.1 °C) 87 20 126/82 95 %   08/13/19 1400     93 %   08/13/19 1359    126/82      General: alert mild distress  Eyes: EOMI, normal conjunctiva  ENT: moist mucous membranes. Neck: Active, full ROM of neck. Skin: No rashes. no jaundice              Lungs: Equal chest expansion. mild respiratory distress. Diminished lung sounds due to patient's body habitus using supraclavicular accessory muscles and using intercostal accessory muscles, sitting up on the side of the bed  Heart: regular rate    2+ radial pulses, a chronic peripheral swelling with lymphedema to bilateral lower extremities  Abd:  obese soft, nontender. No rebound tenderness. No guarding  Back: Full ROM  MSK: Full, active ROM in all 4 extremities.    Neuro: A&O X 4; normal speech;   Psych: Cooperative with exam; Appropriate mood and affect             Diagnostic Study Results     Labs -     Recent Results (from the past 12 hour(s))   EKG, 12 LEAD, INITIAL    Collection Time: 08/13/19  2:24 PM   Result Value Ref Range    Ventricular Rate 90 BPM    Atrial Rate 90 BPM    P-R Interval 178 ms    QRS Duration 82 ms    Q-T Interval 358 ms    QTC Calculation (Bezet) 437 ms    Calculated P Axis 45 degrees    Calculated R Axis 80 degrees    Calculated T Axis 40 degrees    Diagnosis       Normal sinus rhythm  Normal ekg  When compared with ECG of 24-JUN-2019 19:11,  No significant change was found  Confirmed by Humaira Case ([id number]) on 8/13/2019 5:15:50 PM     POC G3 - PUL    Collection Time: 08/13/19  2:55 PM   Result Value Ref Range    pH (POC) 7.346 (L) 7.35 - 7.45      pCO2 (POC) 59.1 (H) 35.0 - 45.0 MMHG    pO2 (POC) 66 (L) 80 - 100 MMHG    HCO3 (POC) 32.3 (H) 22 - 26 MMOL/L    sO2 (POC) 91 (L) 92 - 97 %    Base excess (POC) 7 mmol/L    Site LEFT RADIAL      Device: NASAL CANNULA      Flow rate (POC) 4 L/M    Allens test (POC) YES      Specimen type (POC) ARTERIAL      Total resp. rate 19     CBC WITH AUTOMATED DIFF    Collection Time: 08/13/19  2:58 PM   Result Value Ref Range    WBC 9.4 3.6 - 11.0 K/uL    RBC 4.60 3.80 - 5.20 M/uL    HGB 12.4 11.5 - 16.0 g/dL    HCT 43.2 35.0 - 47.0 %    MCV 93.9 80.0 - 99.0 FL    MCH 27.0 26.0 - 34.0 PG    MCHC 28.7 (L) 30.0 - 36.5 g/dL    RDW 13.4 11.5 - 14.5 %    PLATELET 754 237 - 905 K/uL    MPV 11.3 8.9 - 12.9 FL    NRBC 0.0 0  WBC    ABSOLUTE NRBC 0.00 0.00 - 0.01 K/uL    NEUTROPHILS 80 (H) 32 - 75 %    LYMPHOCYTES 11 (L) 12 - 49 %    MONOCYTES 7 5 - 13 %    EOSINOPHILS 2 0 - 7 %    BASOPHILS 0 0 - 1 %    IMMATURE GRANULOCYTES 0 0.0 - 0.5 %    ABS. NEUTROPHILS 7.5 1.8 - 8.0 K/UL    ABS. LYMPHOCYTES 1.0 0.8 - 3.5 K/UL    ABS. MONOCYTES 0.7 0.0 - 1.0 K/UL    ABS. EOSINOPHILS 0.2 0.0 - 0.4 K/UL    ABS. BASOPHILS 0.0 0.0 - 0.1 K/UL    ABS. IMM.  GRANS. 0.0 0.00 - 0.04 K/UL    DF AUTOMATED      RBC COMMENTS HYPOCHROMIA  1+       NT-PRO BNP    Collection Time: 08/13/19  2:58 PM   Result Value Ref Range    NT pro- (H) <125 PG/ML   CK W/ REFLX CKMB    Collection Time: 08/13/19  2:58 PM   Result Value Ref Range    CK 29 26 - 737 U/L   METABOLIC PANEL, COMPREHENSIVE    Collection Time: 08/13/19  2:58 PM   Result Value Ref Range    Sodium 138 136 - 145 mmol/L    Potassium 4.0 3.5 - 5.1 mmol/L    Chloride 101 97 - 108 mmol/L    CO2 33 (H) 21 - 32 mmol/L    Anion gap 4 (L) 5 - 15 mmol/L    Glucose 119 (H) 65 - 100 mg/dL    BUN 16 6 - 20 MG/DL    Creatinine 0.76 0.55 - 1.02 MG/DL    BUN/Creatinine ratio 21 (H) 12 - 20      GFR est AA >60 >60 ml/min/1.73m2    GFR est non-AA >60 >60 ml/min/1.73m2    Calcium 10.1 8.5 - 10.1 MG/DL    Bilirubin, total 0.4 0.2 - 1.0 MG/DL    ALT (SGPT) 17 12 - 78 U/L    AST (SGOT) 14 (L) 15 - 37 U/L    Alk. phosphatase 89 45 - 117 U/L    Protein, total 8.3 (H) 6.4 - 8.2 g/dL    Albumin 3.2 (L) 3.5 - 5.0 g/dL    Globulin 5.1 (H) 2.0 - 4.0 g/dL    A-G Ratio 0.6 (L) 1.1 - 2.2     TROPONIN I    Collection Time: 08/13/19  2:58 PM   Result Value Ref Range    Troponin-I, Qt. <0.05 <0.05 ng/mL       Radiologic Studies -   XR CHEST PORT   Final Result   IMPRESSION: Mild bibasilar atelectasis. CT Results  (Last 48 hours)    None        CXR Results  (Last 48 hours)               08/13/19 1438  XR CHEST PORT Final result    Impression:  IMPRESSION: Mild bibasilar atelectasis. Narrative:  INDICATION: Shortness of breath. Portable PA standing view of the Chest.       Direct comparison made to prior chest x-ray dated 6/24/2019. Cardiomediastinal silhouette is stable. There is mild bibasilar atelectasis. No   pleural fluid is seen. There is no pneumothorax.                     Medical Decision Making     Differential Diagnosis: COPD exacerbation, obesity hypoventilation syndrome, pneumonia, pneumothorax, ACS    I reviewed the vital signs, available nursing notes, past medical history, past surgical history, family history and social history and old medical records. On my interpretation, Laboratory workup is significant for pH 7.34, PCO2 of 59.1, unremarkable CBC, BNP is 324, troponin is negative, and electrolytes unremarkable  On my interpretation of the radiology studies bibasilar atelectasis, no infiltrative disease, no pleural fluid, no pneumothorax  On my interpretation of the EKG no acute ischemic changes, rate is 90, QTc is 437    Management/ED course: Patient presents today with increased shortness of breath in the setting of some social difficulties at home. Her physical exam is significant for mild respiratory distress with tachypnea, diminished lung sounds due to the patient's body habitus, but no significant wheezing, or hypoxia that is out of the ordinary for the patient. She was satting her usual low 90s on 5 L of nasal cannula oxygen. She was treated with nebulization treatments including albuterol and Atrovent. I did give her steroids, and antibiotics for treatment of COPD exacerbation. No infiltrative pneumonia. Patient had improvement of her symptoms while she was here. I did treat her with Lasix as well. I suspect her shortness of breath is due to not using her CPAP for several days which has exacerbated her obesity hypoventilation, COPD. We did discuss the importance of using her CPAP even if it is uncomfortable in the heat. She understands and is comfortable with plan for discharge    ED Course:   Initial assessment performed. The patients presenting problems have been discussed, and they are in agreement with the care plan formulated and outlined with them. I have encouraged them to ask questions as they arise throughout their visit. Procedures:  0    Disposition: Discharged. Discharge Note:  The patient has been re-evaluated and is ready for discharge. Reviewed available results with patient.  Counseled patient on diagnosis and care plan. Patient has expressed understanding, and all questions have been answered. Patient agrees with plan and agrees to follow up as recommended, or to return to the ED if their symptoms worsen. Discharge instructions have been provided and explained to the patient, along with reasons to return to the ED. Diagnosis     Clinical Impression:   1. SOB (shortness of breath)    2. Hypercapnia    3. COPD exacerbation ([facility] [address]        Attestations:  Rosalee Pate MD        Please note that this dictation was completed with [employer]: [place], the computer voice recognition software. Quite often unanticipated grammatical, syntax, homophones, and other interpretive errors are inadvertently transcribed by the computer software. Please disregard these errors. Please excuse any errors that have escaped final proofreading. Thank you.

## 2019-08-20 ENCOUNTER — APPOINTMENT (OUTPATIENT)
Dept: GENERAL RADIOLOGY | Age: 49
End: 2019-08-20
Attending: EMERGENCY MEDICINE
Payer: MEDICARE

## 2019-08-20 ENCOUNTER — HOSPITAL ENCOUNTER (EMERGENCY)
Age: 49
Discharge: HOME | End: 2019-08-21
Attending: EMERGENCY MEDICINE
Payer: MEDICARE

## 2019-08-20 VITALS
SYSTOLIC BLOOD PRESSURE: 112 MMHG | OXYGEN SATURATION: 97 % | HEIGHT: 66 IN | WEIGHT: 293 LBS | RESPIRATION RATE: 18 BRPM | TEMPERATURE: 97.9 F | BODY MASS INDEX: 47.09 KG/M2 | DIASTOLIC BLOOD PRESSURE: 65 MMHG | HEART RATE: 85 BPM

## 2019-08-20 DIAGNOSIS — R05.9 COUGH: Primary | ICD-10-CM

## 2019-08-20 LAB
ALBUMIN SERPL-MCNC: 3.2 G/DL (ref 3.5–5)
ALBUMIN/GLOB SERPL: 0.6 {RATIO} (ref 1.1–2.2)
ALP SERPL-CCNC: 94 U/L (ref 45–117)
ALT SERPL-CCNC: 18 U/L (ref 12–78)
ANION GAP SERPL CALC-SCNC: 4 MMOL/L (ref 5–15)
AST SERPL-CCNC: 19 U/L (ref 15–37)
BASOPHILS # BLD: 0 K/UL (ref 0–0.1)
BASOPHILS NFR BLD: 0 % (ref 0–1)
BILIRUB SERPL-MCNC: 0.5 MG/DL (ref 0.2–1)
BNP SERPL-MCNC: 255 PG/ML
BUN SERPL-MCNC: 14 MG/DL (ref 6–20)
BUN/CREAT SERPL: 18 (ref 12–20)
CALCIUM SERPL-MCNC: 10.4 MG/DL (ref 8.5–10.1)
CHLORIDE SERPL-SCNC: 101 MMOL/L (ref 97–108)
CK SERPL-CCNC: 70 U/L (ref 26–192)
CK SERPL-CCNC: 70 U/L (ref 26–192)
CO2 SERPL-SCNC: 34 MMOL/L (ref 21–32)
CREAT SERPL-MCNC: 0.79 MG/DL (ref 0.55–1.02)
DIFFERENTIAL METHOD BLD: ABNORMAL
EOSINOPHIL # BLD: 0.2 K/UL (ref 0–0.4)
EOSINOPHIL NFR BLD: 2 % (ref 0–7)
ERYTHROCYTE [DISTWIDTH] IN BLOOD BY AUTOMATED COUNT: 13.5 % (ref 11.5–14.5)
GLOBULIN SER CALC-MCNC: 5.3 G/DL (ref 2–4)
GLUCOSE SERPL-MCNC: 62 MG/DL (ref 65–100)
HCT VFR BLD AUTO: 44.6 % (ref 35–47)
HGB BLD-MCNC: 12.8 G/DL (ref 11.5–16)
IMM GRANULOCYTES # BLD AUTO: 0 K/UL (ref 0–0.04)
IMM GRANULOCYTES NFR BLD AUTO: 0 % (ref 0–0.5)
LYMPHOCYTES # BLD: 1.4 K/UL (ref 0.8–3.5)
LYMPHOCYTES NFR BLD: 14 % (ref 12–49)
MCH RBC QN AUTO: 26.9 PG (ref 26–34)
MCHC RBC AUTO-ENTMCNC: 28.7 G/DL (ref 30–36.5)
MCV RBC AUTO: 93.7 FL (ref 80–99)
MONOCYTES # BLD: 0.7 K/UL (ref 0–1)
MONOCYTES NFR BLD: 7 % (ref 5–13)
NEUTS SEG # BLD: 7.8 K/UL (ref 1.8–8)
NEUTS SEG NFR BLD: 77 % (ref 32–75)
NRBC # BLD: 0 K/UL (ref 0–0.01)
NRBC BLD-RTO: 0 PER 100 WBC
PLATELET # BLD AUTO: 208 K/UL (ref 150–400)
PMV BLD AUTO: 11.8 FL (ref 8.9–12.9)
POTASSIUM SERPL-SCNC: 4.6 MMOL/L (ref 3.5–5.1)
PROT SERPL-MCNC: 8.5 G/DL (ref 6.4–8.2)
RBC # BLD AUTO: 4.76 M/UL (ref 3.8–5.2)
SODIUM SERPL-SCNC: 139 MMOL/L (ref 136–145)
TROPONIN I SERPL-MCNC: <0.05 NG/ML
WBC # BLD AUTO: 10.1 K/UL (ref 3.6–11)

## 2019-08-20 PROCEDURE — 96374 THER/PROPH/DIAG INJ IV PUSH: CPT

## 2019-08-20 PROCEDURE — 84484 ASSAY OF TROPONIN QUANT: CPT

## 2019-08-20 PROCEDURE — 71046 X-RAY EXAM CHEST 2 VIEWS: CPT

## 2019-08-20 PROCEDURE — 85025 COMPLETE CBC W/AUTO DIFF WBC: CPT

## 2019-08-20 PROCEDURE — 82550 ASSAY OF CK (CPK): CPT

## 2019-08-20 PROCEDURE — 83880 ASSAY OF NATRIURETIC PEPTIDE: CPT

## 2019-08-20 PROCEDURE — 93005 ELECTROCARDIOGRAM TRACING: CPT

## 2019-08-20 PROCEDURE — 99285 EMERGENCY DEPT VISIT HI MDM: CPT

## 2019-08-20 PROCEDURE — 80053 COMPREHEN METABOLIC PANEL: CPT

## 2019-08-20 PROCEDURE — 74011250636 HC RX REV CODE- 250/636: Performed by: EMERGENCY MEDICINE

## 2019-08-20 RX ORDER — GUAIFENESIN 200 MG/1
200 TABLET ORAL
Qty: 28 TAB | Refills: 0 | Status: SHIPPED | OUTPATIENT
Start: 2019-08-20 | End: 2019-08-27

## 2019-08-20 RX ORDER — FUROSEMIDE 10 MG/ML
40 INJECTION INTRAMUSCULAR; INTRAVENOUS ONCE
Status: COMPLETED | OUTPATIENT
Start: 2019-08-20 | End: 2019-08-20

## 2019-08-20 RX ADMIN — FUROSEMIDE 40 MG: 10 INJECTION, SOLUTION INTRAMUSCULAR; INTRAVENOUS at 22:05

## 2019-08-20 NOTE — ED TRIAGE NOTES
Assumed care of pt from triage. Pt CC of SOB, congestion, and productive cough. Pt was seen here last week for same symptoms and reports no improvement. Pt placed on monitor x2. Call bell in reach. VSS.

## 2019-08-21 LAB
ATRIAL RATE: 77 BPM
CALCULATED P AXIS, ECG09: 52 DEGREES
CALCULATED R AXIS, ECG10: 78 DEGREES
CALCULATED T AXIS, ECG11: 54 DEGREES
DIAGNOSIS, 93000: NORMAL
P-R INTERVAL, ECG05: 194 MS
Q-T INTERVAL, ECG07: 368 MS
QRS DURATION, ECG06: 80 MS
QTC CALCULATION (BEZET), ECG08: 416 MS
VENTRICULAR RATE, ECG03: 77 BPM

## 2019-08-21 NOTE — ED PROVIDER NOTES
EMERGENCY DEPARTMENT HISTORY AND PHYSICAL EXAM      Date: 8/20/2019  Patient Name: Salma Hogan  Patient Age and Sex: 54 y.o. female     History of Presenting Illness     Chief Complaint   Patient presents with    Shortness of Breath     pt arrives by EMS with reports of shortness of breath, congestion, and productive cough, was seen in ED last week for same and we \"didn't fix\" her    Cough       History Provided By: Patient    HPI: Salma Hogan is a 31-year-old female past medical history of obesity, heart failure, diabetes, CHF presenting today with cough. Patient states that she has continued to have cough for the past week. She is primarily concerned that she is bringing up green mucus sometimes through her nose and sometimes to her mouth. She denies any fevers, significant lower extremity swelling, or severe chest pain. No other associated symptoms. There are no other complaints, changes, or physical findings at this time. PCP: Pk Pringle MD    No current facility-administered medications on file prior to encounter. Current Outpatient Medications on File Prior to Encounter   Medication Sig Dispense Refill    alum-mag hydroxide-simeth (MAALOX ADVANCED) 200-200-20 mg/5 mL susp Take 30 mL by mouth every four (4) hours as needed for Pain. 354 mL 0    ranolazine ER (RANEXA) 500 mg SR tablet Take 1 Tab by mouth two (2) times a day. 60 Tab 8    cyclobenzaprine (FLEXERIL) 5 mg tablet Take 1 Tab by mouth two (2) times daily as needed for Muscle Spasm(s). flexeriol 5 Tab 0    albuterol-ipratropium (DUO-NEB) 2.5 mg-0.5 mg/3 ml nebu 3 mL by Nebulization route four (4) times daily. 100 Nebule 1    furosemide (LASIX) 40 mg tablet Take 40 mg by mouth two (2) times a day.  nystatin (MYCOSTATIN) topical cream Apply  to affected area two (2) times a day.  nystatin (MYCOSTATIN) powder Apply  to affected area two (2) times a day.       fluticasone (FLONASE) 50 mcg/actuation nasal spray 2 Sprays by Both Nostrils route daily. 1 Bottle 0    insulin glargine (LANTUS) 100 unit/mL injection 45 Units by SubCUTAneous route daily.  metoprolol tartrate (LOPRESSOR) 25 mg tablet Take 25 mg by mouth two (2) times a day.  fluticasone-salmeterol (ADVAIR DISKUS) 500-50 mcg/dose diskus inhaler Take 1 Puff by inhalation every twelve (12) hours.  loratadine (CLARITIN) 10 mg tablet Take 10 mg by mouth daily.  albuterol (VENTOLIN HFA) 90 mcg/actuation inhaler Take 2 Puffs by inhalation every six (6) hours as needed for Wheezing.  aspirin 81 mg chewable tablet Take 81 mg by mouth daily.  hydrOXYzine pamoate (VISTARIL) 50 mg capsule Take 50 mg by mouth every eight (8) hours as needed for Itching.  lovastatin (MEVACOR) 40 mg tablet Take 1 Tab by mouth nightly. 30 Tab 6    glyBURIDE (DIABETA) 5 mg tablet Take 5 mg by mouth two (2) times daily (with meals).  ammonium lactate (LAC-HYDRIN) 12 % topical cream rub in to affected area well 280 g 1    nitroglycerin (NITROSTAT) 0.4 mg SL tablet 1 Tab by SubLINGual route every five (5) minutes as needed for Chest Pain (call 911 if not relieved by 3).  25 Tab 2       Past History     Past Medical History:  Past Medical History:   Diagnosis Date    Arthritis     Asthma     Breast lump     CAD (coronary artery disease)     Congestive heart failure (HCC)     COPD (chronic obstructive pulmonary disease) (HCC)     [facility]     Hypertension     Morbid obesity with BMI of 70 and over, adult ([facility]     NSTEMI (non-ST elevated myocardial infarction) ([facility]     SVT (supraventricular tachycardia) ([facility]        Past Surgical History:  Past Surgical History:   Procedure Laterality Date    CARDIAC SURG PROCEDURE UNLIST      Stents    HX  SECTION      HX ORTHOPAEDIC      HX OTHER SURGICAL      cyst removed from back       Family History:  Family History   Problem Relation Age of Onset    Heart Disease Mother     Heart Disease Father     Heart Disease Sister     Breast Cancer Maternal Grandmother     Breast Cancer Paternal Grandmother        Social History:  Social History     Tobacco Use    Smoking status: Former Smoker     Packs/day: 0.25     Types: Cigarettes    Smokeless tobacco: Never Used    Tobacco comment: Patient states \"I  aint smoking no more d*mn cigarettes after yesterday\" 01/26/2018   Substance Use Topics    Alcohol use: No    Drug use: No       Allergies:  No Known Allergies      Review of Systems   Constitutional: No  fever,  No  headache  Skin: No  rash, No  jaundice  HEENT: No  nasal congestion, No  eye drainage. Resp: + cough,  No  wheezing  CV: No chest pain, No  palpitations  GI: No vomiting,  No  diarrhea.,  No  constipation  : No dysuria,  No  hematuria  MSK: No joint pain,  No  trauma  Neuro: No numbness, No  tingling  Psych: No suicidal, No  paranoid      Physical Exam     Patient Vitals for the past 24 hrs:   Temp Pulse Resp BP SpO2   08/20/19 2200    112/65 97 %   08/20/19 2130    131/73 96 %   08/20/19 1832 97.9 °F (36.6 °C) 85 18 (!) 141/92 94 %       General: alert, No acute distress, sitting comfortably in a chair  Eyes: EOMI, normal conjunctiva  ENT: moist mucous membranes. Neck: Active, full ROM of neck. Skin: No rashes. no jaundice              Lungs: Equal chest expansion. no respiratory distress. clear to auscultation bilaterally No accessory muscle usage  Heart: regular rate     no peripheral edema   2+ radial pulses and DPs bilaterally  Abd:  non distended soft, nontender. No rebound tenderness. No guarding  Back: Full ROM  MSK: Full, active ROM in all 4 extremities.    Neuro: Person, Place, Time and Situation; normal speech;   Psych: Cooperative with exam; Appropriate mood and affect             Diagnostic Study Results     Recent Results (from the past 24 hour(s))   EKG, 12 LEAD, INITIAL    Collection Time: 08/20/19  8:13 PM   Result Value Ref Range    Ventricular Rate 77 BPM Atrial Rate 77 BPM    P-R Interval 194 ms    QRS Duration 80 ms    Q-T Interval 368 ms    QTC Calculation (Bezet) 416 ms    Calculated P Axis 52 degrees    Calculated R Axis 78 degrees    Calculated T Axis 54 degrees    Diagnosis       Normal sinus rhythm    When compared with ECG of 13-AUG-2019 14:24,  No significant change was found  Confirmed by Casper Corey ([id number]) on 8/21/2019 12:08:20 PM     CK W/ REFLX CKMB    Collection Time: 08/20/19  8:59 PM   Result Value Ref Range    CK 70 26 - 192 U/L   TROPONIN I    Collection Time: 08/20/19  8:59 PM   Result Value Ref Range    Troponin-I, Qt. <0.05 <0.05 ng/mL   NT-PRO BNP    Collection Time: 08/20/19  8:59 PM   Result Value Ref Range    NT pro- (H) <019 PG/ML   METABOLIC PANEL, COMPREHENSIVE    Collection Time: 08/20/19  8:59 PM   Result Value Ref Range    Sodium 139 136 - 145 mmol/L    Potassium 4.6 3.5 - 5.1 mmol/L    Chloride 101 97 - 108 mmol/L    CO2 34 (H) 21 - 32 mmol/L    Anion gap 4 (L) 5 - 15 mmol/L    Glucose 62 (L) 65 - 100 mg/dL    BUN 14 6 - 20 MG/DL    Creatinine 0.79 0.55 - 1.02 MG/DL    BUN/Creatinine ratio 18 12 - 20      GFR est AA >60 >60 ml/min/1.73m2    GFR est non-AA >60 >60 ml/min/1.73m2    Calcium 10.4 (H) 8.5 - 10.1 MG/DL    Bilirubin, total 0.5 0.2 - 1.0 MG/DL    ALT (SGPT) 18 12 - 78 U/L    AST (SGOT) 19 15 - 37 U/L    Alk.  phosphatase 94 45 - 117 U/L    Protein, total 8.5 (H) 6.4 - 8.2 g/dL    Albumin 3.2 (L) 3.5 - 5.0 g/dL    Globulin 5.3 (H) 2.0 - 4.0 g/dL    A-G Ratio 0.6 (L) 1.1 - 2.2     CBC WITH AUTOMATED DIFF    Collection Time: 08/20/19  8:59 PM   Result Value Ref Range    WBC 10.1 3.6 - 11.0 K/uL    RBC 4.76 3.80 - 5.20 M/uL    HGB 12.8 11.5 - 16.0 g/dL    HCT 44.6 35.0 - 47.0 %    MCV 93.7 80.0 - 99.0 FL    MCH 26.9 26.0 - 34.0 PG    MCHC 28.7 (L) 30.0 - 36.5 g/dL    RDW 13.5 11.5 - 14.5 %    PLATELET 853 397 - 614 K/uL    MPV 11.8 8.9 - 12.9 FL    NRBC 0.0 0  WBC    ABSOLUTE NRBC 0.00 0.00 - 0.01 K/uL NEUTROPHILS 77 (H) 32 - 75 %    LYMPHOCYTES 14 12 - 49 %    MONOCYTES 7 5 - 13 %    EOSINOPHILS 2 0 - 7 %    BASOPHILS 0 0 - 1 %    IMMATURE GRANULOCYTES 0 0.0 - 0.5 %    ABS. NEUTROPHILS 7.8 1.8 - 8.0 K/UL    ABS. LYMPHOCYTES 1.4 0.8 - 3.5 K/UL    ABS. MONOCYTES 0.7 0.0 - 1.0 K/UL    ABS. EOSINOPHILS 0.2 0.0 - 0.4 K/UL    ABS. BASOPHILS 0.0 0.0 - 0.1 K/UL    ABS. IMM. GRANS. 0.0 0.00 - 0.04 K/UL    DF AUTOMATED     CK W/ REFLX CKMB    Collection Time: 08/20/19  8:59 PM   Result Value Ref Range    CK 70 26 - 192 U/L       Radiologic Studies -   XR CHEST PA LAT   Final Result   IMPRESSION:  No acute cardiopulmonary process                    CT Results  (Last 48 hours)    None        CXR Results  (Last 48 hours)               08/20/19 2034  XR CHEST PA LAT Final result    Impression:  IMPRESSION:  No acute cardiopulmonary process                       Narrative:  PROCEDURE: XR CHEST PA LAT       REASON FOR STUDY: congestion/cough        COMPARISON: 8/13/2019       FINDINGS: 2 views of the chest and straight cardiomegaly without evidence of   airspace disease, interstitial edema, or effusion. Degenerative changes are   present in the thoracic spine. Medical Decision Making     Differential Diagnosis: CHF exacerbation, pneumonia, viral URI, pneumothorax    I reviewed the vital signs, available nursing notes, past medical history, past surgical history, family history and social history and old medical records.   On my interpretation, Laboratory workup is significant for BNP is minimally elevated to 55, troponin is negative, electrolytes largely unremarkable, CBC without significant abnormalities  On my interpretation of the radiology studies chest x-ray shows no evidence of acute process, no pneumonia, no pneumothorax  On my interpretation of the EKG rate 77, QTC is 416, normal sinus rhythm, no ST elevation or depression    Management/ED course: Patient presented with cough, negative work-up, likely chronic cough related to fluid overload. Prescribed guaifenesin and give return precautions. Dispo: Discharged. The patient has been re-evaluated and is ready for discharge. Reviewed available results with patient. Counseled patient on diagnosis and care plan. Patient has expressed understanding, and all questions have been answered. Patient agrees with plan and agrees to follow up as recommended, or to return to the ED if their symptoms worsen. Discharge instructions have been provided and explained to the patient, along with reasons to return to the ED. PLAN:  Discharge Medication List as of 8/20/2019 10:46 PM      START taking these medications    Details   guaiFENesin (ORGANIDIN) 200 mg tablet Take 1 Tab by mouth every four (4) hours as needed for Congestion for up to 7 days. , Normal, Disp-28 Tab, R-0         CONTINUE these medications which have NOT CHANGED    Details   alum-mag hydroxide-simeth (MAALOX ADVANCED) 200-200-20 mg/5 mL susp Take 30 mL by mouth every four (4) hours as needed for Pain., Normal, Disp-354 mL, R-0      ranolazine ER (RANEXA) 500 mg SR tablet Take 1 Tab by mouth two (2) times a day., Normal, Disp-60 Tab, R-8      cyclobenzaprine (FLEXERIL) 5 mg tablet Take 1 Tab by mouth two (2) times daily as needed for Muscle Spasm(s). flexeriol, Print, Disp-5 Tab, R-0      albuterol-ipratropium (DUO-NEB) 2.5 mg-0.5 mg/3 ml nebu 3 mL by Nebulization route four (4) times daily. , Normal, Disp-100 Nebule, R-1      furosemide (LASIX) 40 mg tablet Take 40 mg by mouth two (2) times a day., Historical Med      nystatin (MYCOSTATIN) topical cream Apply  to affected area two (2) times a day., Historical Med      nystatin (MYCOSTATIN) powder Apply  to affected area two (2) times a day., Historical Med      fluticasone (FLONASE) 50 mcg/actuation nasal spray 2 Sprays by Both Nostrils route daily. , Print, Disp-1 Bottle, R-0      insulin glargine (LANTUS) 100 unit/mL injection 45 Units by SubCUTAneous route daily. , Historical Med      metoprolol tartrate (LOPRESSOR) 25 mg tablet Take 25 mg by mouth two (2) times a day., Historical Med      fluticasone-salmeterol (ADVAIR DISKUS) 500-50 mcg/dose diskus inhaler Take 1 Puff by inhalation every twelve (12) hours. , Historical Med      loratadine (CLARITIN) 10 mg tablet Take 10 mg by mouth daily. , Historical Med      albuterol (VENTOLIN HFA) 90 mcg/actuation inhaler Take 2 Puffs by inhalation every six (6) hours as needed for Wheezing., Historical Med      aspirin 81 mg chewable tablet Take 81 mg by mouth daily. , Historical Med      hydrOXYzine pamoate (VISTARIL) 50 mg capsule Take 50 mg by mouth every eight (8) hours as needed for Itching., Historical Med      lovastatin (MEVACOR) 40 mg tablet Take 1 Tab by mouth nightly., Normal, Disp-30 Tab, R-6      glyBURIDE (DIABETA) 5 mg tablet Take 5 mg by mouth two (2) times daily (with meals). , Historical Med      ammonium lactate (LAC-HYDRIN) 12 % topical cream rub in to affected area well, Print, Disp-280 g, R-1      nitroglycerin (NITROSTAT) 0.4 mg SL tablet 1 Tab by SubLINGual route every five (5) minutes as needed for Chest Pain (call 911 if not relieved by 3). , Normal, Disp-25 Tab, R-2         1.   2.     Follow-up Information     Follow up With Specialties Details Why Contact Info    Marcial Mosqueda MD General Practice Call in 1 week  [address]  [phone]          3. Return to ED if worse     Diagnosis     Clinical Impression:   1.  Cough        Attestations:    Antoni Pretyt MD

## 2019-08-29 ENCOUNTER — APPOINTMENT (OUTPATIENT)
Dept: GENERAL RADIOLOGY | Age: 49
End: 2019-08-29
Attending: EMERGENCY MEDICINE
Payer: MEDICARE

## 2019-08-29 ENCOUNTER — HOSPITAL ENCOUNTER (EMERGENCY)
Age: 49
Discharge: HOME | End: 2019-08-30
Attending: EMERGENCY MEDICINE
Payer: MEDICARE

## 2019-08-29 DIAGNOSIS — J44.9 CHRONIC OBSTRUCTIVE PULMONARY DISEASE, UNSPECIFIED COPD TYPE (HCC): ICD-10-CM

## 2019-08-29 DIAGNOSIS — I89.0 LYMPHEDEMA: ICD-10-CM

## 2019-08-29 DIAGNOSIS — R06.00 DYSPNEA, UNSPECIFIED TYPE: Primary | ICD-10-CM

## 2019-08-29 LAB
ALBUMIN SERPL-MCNC: 3.1 G/DL (ref 3.5–5)
ALBUMIN/GLOB SERPL: 0.6 {RATIO} (ref 1.1–2.2)
ALP SERPL-CCNC: 91 U/L (ref 45–117)
ALT SERPL-CCNC: 20 U/L (ref 12–78)
ANION GAP SERPL CALC-SCNC: 4 MMOL/L (ref 5–15)
AST SERPL-CCNC: 19 U/L (ref 15–37)
BASOPHILS # BLD: 0 K/UL (ref 0–0.1)
BASOPHILS NFR BLD: 0 % (ref 0–1)
BILIRUB SERPL-MCNC: 0.3 MG/DL (ref 0.2–1)
BUN SERPL-MCNC: 18 MG/DL (ref 6–20)
BUN/CREAT SERPL: 18 (ref 12–20)
CALCIUM SERPL-MCNC: 9.8 MG/DL (ref 8.5–10.1)
CHLORIDE SERPL-SCNC: 104 MMOL/L (ref 97–108)
CK SERPL-CCNC: 56 U/L (ref 26–192)
CO2 SERPL-SCNC: 32 MMOL/L (ref 21–32)
CREAT SERPL-MCNC: 0.98 MG/DL (ref 0.55–1.02)
DIFFERENTIAL METHOD BLD: ABNORMAL
EOSINOPHIL # BLD: 0.2 K/UL (ref 0–0.4)
EOSINOPHIL NFR BLD: 2 % (ref 0–7)
ERYTHROCYTE [DISTWIDTH] IN BLOOD BY AUTOMATED COUNT: 13.2 % (ref 11.5–14.5)
GLOBULIN SER CALC-MCNC: 4.8 G/DL (ref 2–4)
GLUCOSE SERPL-MCNC: 172 MG/DL (ref 65–100)
HCT VFR BLD AUTO: 41.7 % (ref 35–47)
HGB BLD-MCNC: 12.3 G/DL (ref 11.5–16)
IMM GRANULOCYTES # BLD AUTO: 0 K/UL (ref 0–0.04)
IMM GRANULOCYTES NFR BLD AUTO: 0 % (ref 0–0.5)
LYMPHOCYTES # BLD: 1 K/UL (ref 0.8–3.5)
LYMPHOCYTES NFR BLD: 11 % (ref 12–49)
MCH RBC QN AUTO: 27.3 PG (ref 26–34)
MCHC RBC AUTO-ENTMCNC: 29.5 G/DL (ref 30–36.5)
MCV RBC AUTO: 92.7 FL (ref 80–99)
MONOCYTES # BLD: 0.5 K/UL (ref 0–1)
MONOCYTES NFR BLD: 6 % (ref 5–13)
NEUTS SEG # BLD: 7.5 K/UL (ref 1.8–8)
NEUTS SEG NFR BLD: 81 % (ref 32–75)
NRBC # BLD: 0 K/UL (ref 0–0.01)
NRBC BLD-RTO: 0 PER 100 WBC
PLATELET # BLD AUTO: 133 K/UL (ref 150–400)
PMV BLD AUTO: 11.1 FL (ref 8.9–12.9)
POTASSIUM SERPL-SCNC: 4.1 MMOL/L (ref 3.5–5.1)
PROT SERPL-MCNC: 7.9 G/DL (ref 6.4–8.2)
RBC # BLD AUTO: 4.5 M/UL (ref 3.8–5.2)
SODIUM SERPL-SCNC: 140 MMOL/L (ref 136–145)
TROPONIN I SERPL-MCNC: <0.05 NG/ML
WBC # BLD AUTO: 9.3 K/UL (ref 3.6–11)

## 2019-08-29 PROCEDURE — 82550 ASSAY OF CK (CPK): CPT

## 2019-08-29 PROCEDURE — 36415 COLL VENOUS BLD VENIPUNCTURE: CPT

## 2019-08-29 PROCEDURE — 71045 X-RAY EXAM CHEST 1 VIEW: CPT

## 2019-08-29 PROCEDURE — 80053 COMPREHEN METABOLIC PANEL: CPT

## 2019-08-29 PROCEDURE — 84484 ASSAY OF TROPONIN QUANT: CPT

## 2019-08-29 PROCEDURE — 99284 EMERGENCY DEPT VISIT MOD MDM: CPT

## 2019-08-29 PROCEDURE — 85025 COMPLETE CBC W/AUTO DIFF WBC: CPT

## 2019-08-29 PROCEDURE — 93005 ELECTROCARDIOGRAM TRACING: CPT

## 2019-08-30 VITALS
HEART RATE: 90 BPM | SYSTOLIC BLOOD PRESSURE: 141 MMHG | OXYGEN SATURATION: 93 % | TEMPERATURE: 98 F | RESPIRATION RATE: 18 BRPM | DIASTOLIC BLOOD PRESSURE: 98 MMHG

## 2019-08-30 LAB
ATRIAL RATE: 88 BPM
CALCULATED P AXIS, ECG09: 54 DEGREES
CALCULATED R AXIS, ECG10: 93 DEGREES
CALCULATED T AXIS, ECG11: 45 DEGREES
DIAGNOSIS, 93000: NORMAL
P-R INTERVAL, ECG05: 190 MS
Q-T INTERVAL, ECG07: 372 MS
QRS DURATION, ECG06: 84 MS
QTC CALCULATION (BEZET), ECG08: 450 MS
TROPONIN I SERPL-MCNC: <0.05 NG/ML
VENTRICULAR RATE, ECG03: 88 BPM

## 2019-08-30 PROCEDURE — 84484 ASSAY OF TROPONIN QUANT: CPT

## 2019-08-30 PROCEDURE — 94640 AIRWAY INHALATION TREATMENT: CPT

## 2019-08-30 PROCEDURE — 74011000250 HC RX REV CODE- 250: Performed by: EMERGENCY MEDICINE

## 2019-08-30 PROCEDURE — 36415 COLL VENOUS BLD VENIPUNCTURE: CPT

## 2019-08-30 RX ORDER — ALBUTEROL SULFATE 0.83 MG/ML
5 SOLUTION RESPIRATORY (INHALATION)
Status: COMPLETED | OUTPATIENT
Start: 2019-08-30 | End: 2019-08-30

## 2019-08-30 RX ADMIN — ALBUTEROL SULFATE 5 MG: 2.5 SOLUTION RESPIRATORY (INHALATION) at 00:07

## 2019-08-30 NOTE — DISCHARGE INSTRUCTIONS
Patient Education        Chronic Obstructive Pulmonary Disease (COPD): Care Instructions  Your Care Instructions    Chronic obstructive pulmonary disease (COPD) is a general term for a group of lung diseases, including emphysema and chronic bronchitis. People with COPD have decreased airflow in and out of the lungs, which makes it hard to breathe. The airways also can get clogged with thick mucus. Cigarette smoking is a major cause of COPD. Although there is no cure for COPD, you can slow its progress. Following your treatment plan and taking care of yourself can help you feel better and live longer. Follow-up care is a key part of your treatment and safety. Be sure to make and go to all appointments, and call your doctor if you are having problems. It's also a good idea to know your test results and keep a list of the medicines you take. How can you care for yourself at home?   Staying healthy    · Do not smoke. This is the most important step you can take to prevent more damage to your lungs. If you need help quitting, talk to your doctor about stop-smoking programs and medicines. These can increase your chances of quitting for good.     · Avoid colds and flu. Get a pneumococcal vaccine shot. If you have had one before, ask your doctor whether you need a second dose. Get the flu vaccine every fall. If you must be around people with colds or the flu, wash your hands often.     · Avoid secondhand smoke, air pollution, and high altitudes. Also avoid cold, dry air and hot, humid air. Stay at home with your windows closed when air pollution is bad.    Medicines and oxygen therapy    · Take your medicines exactly as prescribed. Call your doctor if you think you are having a problem with your medicine.     · You may be taking medicines such as:  ? Bronchodilators. These help open your airways and make breathing easier. Bronchodilators are either short-acting (work for 6 to 9 hours) or long-acting (work for 24 hours). You inhale most bronchodilators, so they start to act quickly. Always carry your quick-relief inhaler with you in case you need it while you are away from home. ? Corticosteroids (prednisone, budesonide). These reduce airway inflammation. They come in pill or inhaled form. You must take these medicines every day for them to work well.     · A spacer may help you get more inhaled medicine to your lungs. Ask your doctor or pharmacist if a spacer is right for you. If it is, ask how to use it properly.     · Do not take any vitamins, over-the-counter medicine, or herbal products without talking to your doctor first.     · If your doctor prescribed antibiotics, take them as directed. Do not stop taking them just because you feel better. You need to take the full course of antibiotics.     · Oxygen therapy boosts the amount of oxygen in your blood and helps you breathe easier. Use the flow rate your doctor has recommended, and do not change it without talking to your doctor first.   Activity    · Get regular exercise. Walking is an easy way to get exercise. Start out slowly, and walk a little more each day.     · Pay attention to your breathing. You are exercising too hard if you cannot talk while you are exercising.     · Take short rest breaks when doing household chores and other activities.     · Learn breathing methods--such as breathing through pursed lips--to help you become less short of breath.     · If your doctor has not set you up with a pulmonary rehabilitation program, talk to him or her about whether rehab is right for you. Rehab includes exercise programs, education about your disease and how to manage it, help with diet and other changes, and emotional support. Diet    · Eat regular, healthy meals. Use bronchodilators about 1 hour before you eat to make it easier to eat. Eat several small meals instead of three large ones.  Drink beverages at the end of the meal. Avoid foods that are hard to chew.     · Eat foods that contain protein so that you do not lose muscle mass.     · Talk with your doctor if you gain too much weight or if you lose weight without trying.    Mental health    · Talk to your family, friends, or a therapist about your feelings. It is normal to feel frightened, angry, hopeless, helpless, and even guilty. Talking openly about bad feelings can help you cope. If these feelings last, talk to your doctor. When should you call for help? Call 911 anytime you think you may need emergency care. For example, call if:    · You have severe trouble breathing.    Call your doctor now or seek immediate medical care if:    · You have new or worse trouble breathing.     · You cough up blood.     · You have a fever.    Watch closely for changes in your health, and be sure to contact your doctor if:    · You cough more deeply or more often, especially if you notice more mucus or a change in the color of your mucus.     · You have new or worse swelling in your legs or belly.     · You are not getting better as expected. Where can you learn more? Go to http://cassia-donte.info/. Jareth Whitt in the search box to learn more about \"Chronic Obstructive Pulmonary Disease (COPD): Care Instructions. \"  Current as of: September 5, 2018  Content Version: 12.1  © 2989-1250 [employer]. Care instructions adapted under license by [employer] (which disclaims liability or warranty for this information). If you have questions about a medical condition or this instruction, always ask your healthcare professional. [place] [address] any warranty or liability for your use of this information. Patient Education        Lymphedema: Care Instructions  Your Care Instructions    Lymphedema is fluid that builds up in the arms or legs.  It is often caused by surgery to remove lymph nodes during cancer treatment, especially breast cancer surgery, which can cause fluid to build up in the arm. It can happen after radiation treatment to an area that involves lymph nodes. It also can be caused by a fractured bone or surgery to fix a fracture. And some medicines also can cause lymphedema. Some people get it for unknown reasons. Normally, lymph nodes trap bacteria and other substances as fluid flows through them. Then, the white cells in the body's defense, or immune, system can destroy the substances. But if there are few or no lymph nodes--or if the lymph system in an arm or leg has been damaged--fluid can build up in the affected arm or leg. You can take simple steps at home to help treat or prevent fluid buildup. Treatment may include raising the arm or leg to let gravity drain the fluid. You also can wear compression stockings or sleeves. Follow-up care is a key part of your treatment and safety. Be sure to make and go to all appointments, and call your doctor if you are having problems. It's also a good idea to know your test results and keep a list of the medicines you take. How can you care for yourself at home? · Wear a compression stocking or sleeve as your doctor suggests. It can help keep fluid from pooling in an arm or leg. Wear it during air travel. · Prop up the swollen arm or leg on a pillow anytime you sit or lie down. Try to keep it above the level of your heart. This will help reduce swelling. · Avoid crossing your legs if your legs are swollen. · Get some exercise on most days of the week. Increase the intensity of exercise slowly. Water aerobics can help reduce swelling by helping fluid move around. Wear your compression stocking or sleeve during exercise, but not during water exercise. · See a physical therapist. He or she can teach you how to do self-massage to help fluid move around. You also can learn what activities would be best for you. · Keep your feet clean and wear clean socks or stockings every day.  Check your feet often for signs of infection, such as redness or heat. Do not walk barefoot. · If you have had lymph nodes removed from under your arm:  ? Do not have blood drawn from the arm on the side of the lymph node surgery. ? Do not allow a blood pressure cuff to be placed on that arm. If you are in the hospital, make sure your nurse and other hospital staff know of your condition. ? Wear gloves when gardening or doing other activities that may lead to cuts on your fingers or hands. · If you have had lymph nodes removed from your groin:  ? Bathe your feet daily in lukewarm, not hot, water. Use a mild soap that has a moisturizer, or use a moisturizer separately. ? Check your feet for blisters or cuts. ? Wear comfortable and supportive shoes that fit properly. ? Wear the correct size of panty hose and stockings. Avoid garters or knee-high or thigh-high stockings. · Ask your doctor how to treat any cuts, scratches, insect bites, or other injuries that may occur. · Use sunscreen and insect repellent when outdoors to protect your skin from sunburn and insect bites. · Wear medical alert jewelry that says you have lymphedema. You can buy these at most [employer] and on the Internet. When should you call for help? Call your doctor now or seek immediate medical care if:    · You have signs of infection, such as:  ? Increased pain, swelling, warmth, or redness. ? Red streaks leading from the area. ? Pus draining from the area. ? A fever.    Watch closely for changes in your health, and be sure to contact your doctor if:    · You have new or worse symptoms from lymphedema.     · You do not get better as expected. Where can you learn more? Go to http://cassia-donte.info/. Enter V398 in the search box to learn more about \"Lymphedema: Care Instructions. \"  Current as of: December 19, 2018  Content Version: 12.1  © 2019-3817 [employer].  Care instructions adapted under license by [employer] (which disclaims liability or warranty for this information). If you have questions about a medical condition or this instruction, always ask your healthcare professional. [place] [address] any warranty or liability for your use of this information.

## 2019-08-31 NOTE — ED PROVIDER NOTES
EMERGENCY DEPARTMENT HISTORY AND PHYSICAL EXAM      Date: 8/29/2019  Patient Name: Adwoa Mantilla    History of Presenting Illness     Chief Complaint   Patient presents with    Chest Pain       History Provided By: Patient    HPI: Adwoa Mantilla, 54 y.o. female with PMHx as noted below presents the emergency department with chief complaint of dyspnea and chest pain. Patient states that she suffers from chronic dyspnea and symptoms seem to be worsening over the last few months. Patient notes that she had some mild midsternal chest tightness at approximately 9 PM that lasted only for a few moments and then resolved spontaneously. Currently she is complaining of dyspnea but otherwise has no cough, fevers, chills, abdominal pain, nausea, vomiting, diaphoresis. The pain did not radiate. PCP: Hyman Scheuermann, MD    Current Outpatient Medications   Medication Sig Dispense Refill    alum-mag hydroxide-simeth (MAALOX ADVANCED) 200-200-20 mg/5 mL susp Take 30 mL by mouth every four (4) hours as needed for Pain. 354 mL 0    ranolazine ER (RANEXA) 500 mg SR tablet Take 1 Tab by mouth two (2) times a day. 60 Tab 8    cyclobenzaprine (FLEXERIL) 5 mg tablet Take 1 Tab by mouth two (2) times daily as needed for Muscle Spasm(s). flexeriol 5 Tab 0    albuterol-ipratropium (DUO-NEB) 2.5 mg-0.5 mg/3 ml nebu 3 mL by Nebulization route four (4) times daily. 100 Nebule 1    furosemide (LASIX) 40 mg tablet Take 40 mg by mouth two (2) times a day.  nystatin (MYCOSTATIN) topical cream Apply  to affected area two (2) times a day.  nystatin (MYCOSTATIN) powder Apply  to affected area two (2) times a day.  fluticasone (FLONASE) 50 mcg/actuation nasal spray 2 Sprays by Both Nostrils route daily. 1 Bottle 0    insulin glargine (LANTUS) 100 unit/mL injection 45 Units by SubCUTAneous route daily.  metoprolol tartrate (LOPRESSOR) 25 mg tablet Take 25 mg by mouth two (2) times a day.       fluticasone-salmeterol (ADVAIR DISKUS) 500-50 mcg/dose diskus inhaler Take 1 Puff by inhalation every twelve (12) hours.  loratadine (CLARITIN) 10 mg tablet Take 10 mg by mouth daily.  albuterol (VENTOLIN HFA) 90 mcg/actuation inhaler Take 2 Puffs by inhalation every six (6) hours as needed for Wheezing.  aspirin 81 mg chewable tablet Take 81 mg by mouth daily.  hydrOXYzine pamoate (VISTARIL) 50 mg capsule Take 50 mg by mouth every eight (8) hours as needed for Itching.  lovastatin (MEVACOR) 40 mg tablet Take 1 Tab by mouth nightly. 30 Tab 6    glyBURIDE (DIABETA) 5 mg tablet Take 5 mg by mouth two (2) times daily (with meals).  ammonium lactate (LAC-HYDRIN) 12 % topical cream rub in to affected area well 280 g 1    nitroglycerin (NITROSTAT) 0.4 mg SL tablet 1 Tab by SubLINGual route every five (5) minutes as needed for Chest Pain (call 911 if not relieved by 3).  25 Tab 2       Past History     Past Medical History:  Past Medical History:   Diagnosis Date    Arthritis     Asthma     Breast lump     CAD (coronary artery disease)     Congestive heart failure (HCC)     COPD (chronic obstructive pulmonary disease) (HCC)     Diabetes [facility]     Hypertension     Morbid obesity with BMI of 70 and over, adult ([facility]     NSTEMI (non-ST elevated myocardial infarction) ([facility]     SVT (supraventricular tachycardia) ([facility]        Past Surgical History:  Past Surgical History:   Procedure Laterality Date    CARDIAC SURG PROCEDURE UNLIST      Stents    HX  SECTION      HX ORTHOPAEDIC      HX OTHER SURGICAL      cyst removed from back       Family History:  Family History   Problem Relation Age of Onset    Heart Disease Mother     Heart Disease Father     Heart Disease Sister     Breast Cancer Maternal Grandmother     Breast Cancer Paternal Grandmother        Social History:  Social History     Tobacco Use    Smoking status: Former Smoker     Packs/day: 0.25     Types: Cigarettes    Smokeless tobacco: Never Used    Tobacco comment: Patient states \"I  aint smoking no more d*mn cigarettes after yesterday\" 01/26/2018   Substance Use Topics    Alcohol use: No    Drug use: No       Allergies:  No Known Allergies      Review of Systems   Review of Systems  Constitutional: Negative for fever, chills, and fatigue. HENT: Negative for congestion, sore throat, rhinorrhea, sneezing and neck stiffness   Eyes: Negative for discharge and redness. Respiratory: Negative for  shortness of breath, wheezing   Cardiovascular: Negative for chest pain, palpitations   Gastrointestinal: Negative for nausea, vomiting, abdominal pain, constipation, diarrhea and blood in stool. Genitourinary: Negative for dysuria, hematuria, flank pain, decreased urine volume, discharge,   Musculoskeletal: Negative for myalgias or joint pain . Skin: Negative for rash or lesions . Neurological: Negative weakness, light-headedness, numbness and headaches. Physical Exam   Physical Exam    GENERAL: alert and oriented, no acute distress  EYES: PEERL, No injection, discharge or icterus. ENT: Mucous membranes pink and moist.  NECK: Supple  LUNGS: Airway patent. Non-labored respirations. Breath sounds clear with good air entry bilaterally. HEART: Regular rate and rhythm. No peripheral edema  ABDOMEN: Non-distended and non-tender, without guarding or rebound. SKIN:  warm, dry  MSK/EXTREMITIES: Without swelling, tenderness or deformity, symmetric with normal ROM  NEUROLOGICAL: Alert, oriented      Diagnostic Study Results     Labs -   No results found for this or any previous visit (from the past 12 hour(s)). Radiologic Studies -   XR CHEST PORT   Final Result   IMPRESSION: No acute findings on exam compromised by poor penetration of patient   habitus.         CT Results  (Last 48 hours)    None        CXR Results  (Last 48 hours)               08/29/19 9815  XR CHEST PORT Final result    Impression: IMPRESSION: No acute findings on exam compromised by poor penetration of patient   habitus. Narrative:  EXAM: XR CHEST PORT       INDICATION: chest pain       COMPARISON: Chest x-ray 8/20/2019. FINDINGS: A portable AP radiograph of the chest was obtained at 23:06 hours. The   patient is on a cardiac monitor. The lungs are clear for exam compromised by   poor penetration of patient habitus. The cardiac and mediastinal contours and   pulmonary vascularity are normal.  The bones and soft tissues are grossly within   normal limits. Medical Decision Making   I am the first provider for this patient. I reviewed the vital signs, available nursing notes, past medical history, past surgical history, family history and social history. Vital Signs-Reviewed the patient's vital signs. EKG interpretation: (Preliminary)  Rhythm: normal sinus rhythm; and regular . Rate (approx.): 88; ; P wave: normal; QRS interval: normal ; ST/T wave: normal;  was interpreted by Marichuy Santamaria MD,ED Provider. Records Reviewed: Nursing Notes and Old Medical Records    Provider Notes (Medical Decision Making): On presentation, the patient is well appearing, in no acute distress with reassuring vital signs. Based on my history and exam the differential diagnosis for this patient includes CHF exacerbation, pneumonia, pneumothorax, pulmonary embolism, COPD exacerbation. Work-up in the emergency department reassuring, patient maintain adequate oxygen saturations on her home oxygen. This appears to be a chronic problem and feel that pulmonary embolism is unlikely given her reassuring vital signs and absence of pain at this time. Cardiac work-up also reassuring.   I did instruct her that although work-up was negative today but I cannot rule out an underlying cardiac issue and that she should follow-up with her cardiologist as well as her pulmonologist.  She expressed understanding and in agreement with the plan. ED Course:   Initial assessment performed. The patients presenting problems have been discussed, and they are in agreement with the care plan formulated and outlined with them. I have encouraged them to ask questions as they arise throughout their visit. PROGRESS NOTE:  The patient has been re-evaluated and is ready for discharge. Reviewed available results with patient and have counseled them on diagnosis and care plan. They have expressed understanding, and all their questions have been answered. They agree with plan and agree to have pt F/U as recommended, or return to the ED if their sxs worsen. Discharge instructions have been provided and explained to them, along with reasons to have pt return to the ED. The patient is amenable to discharge so will discharge pt at this time    Ag Schuler MD        Disposition:  home    PLAN:  1. Discharge Medication List as of 8/30/2019  1:55 AM        2. Follow-up Information     Follow up With Specialties Details Why Contact Info    True Hernández MD General Practice Schedule an appointment as soon as possible for a visit in 2 days  [address] [address]  [phone]      [facility] [address] [facility] DEPT Emergency Medicine  If symptoms worsen [address]  [address] [facility]  [phone]        Return to ED if worse     Diagnosis     Clinical Impression:   1. Dyspnea, unspecified type    2.  Chronic obstructive pulmonary disease, unspecified COPD type ([facility]    3. Lymphedema

## 2019-09-13 ENCOUNTER — HOSPITAL ENCOUNTER (INPATIENT)
Age: 49
LOS: 2 days | Discharge: HOME | DRG: 189 | End: 2019-09-16
Attending: EMERGENCY MEDICINE | Admitting: INTERNAL MEDICINE
Payer: MEDICARE

## 2019-09-13 ENCOUNTER — APPOINTMENT (OUTPATIENT)
Dept: GENERAL RADIOLOGY | Age: 49
DRG: 189 | End: 2019-09-13
Attending: EMERGENCY MEDICINE
Payer: MEDICARE

## 2019-09-13 DIAGNOSIS — J96.22 ACUTE ON CHRONIC RESPIRATORY FAILURE WITH HYPERCAPNIA (HCC): Primary | ICD-10-CM

## 2019-09-13 LAB
ANION GAP SERPL CALC-SCNC: 1 MMOL/L (ref 5–15)
ARTERIAL PATENCY WRIST A: YES
BASE EXCESS BLD CALC-SCNC: 13 MMOL/L
BDY SITE: ABNORMAL
BUN SERPL-MCNC: 18 MG/DL (ref 6–20)
BUN/CREAT SERPL: 25 (ref 12–20)
CALCIUM SERPL-MCNC: 9.7 MG/DL (ref 8.5–10.1)
CHLORIDE SERPL-SCNC: 102 MMOL/L (ref 97–108)
CO2 SERPL-SCNC: 36 MMOL/L (ref 21–32)
CREAT SERPL-MCNC: 0.72 MG/DL (ref 0.55–1.02)
ERYTHROCYTE [DISTWIDTH] IN BLOOD BY AUTOMATED COUNT: 13.3 % (ref 11.5–14.5)
GAS FLOW.O2 O2 DELIVERY SYS: ABNORMAL L/MIN
GAS FLOW.O2 SETTING OXYMISER: 5 L/M
GLUCOSE SERPL-MCNC: 111 MG/DL (ref 65–100)
HCO3 BLD-SCNC: 38.8 MMOL/L (ref 22–26)
HCT VFR BLD AUTO: 39.8 % (ref 35–47)
HGB BLD-MCNC: 11.9 G/DL (ref 11.5–16)
MCH RBC QN AUTO: 28.1 PG (ref 26–34)
MCHC RBC AUTO-ENTMCNC: 29.9 G/DL (ref 30–36.5)
MCV RBC AUTO: 93.9 FL (ref 80–99)
NRBC # BLD: 0 K/UL (ref 0–0.01)
NRBC BLD-RTO: 0 PER 100 WBC
PCO2 BLD: 75.7 MMHG (ref 35–45)
PH BLD: 7.32 [PH] (ref 7.35–7.45)
PLATELET # BLD AUTO: 155 K/UL (ref 150–400)
PMV BLD AUTO: 11.6 FL (ref 8.9–12.9)
PO2 BLD: 94 MMHG (ref 80–100)
POTASSIUM SERPL-SCNC: 4.2 MMOL/L (ref 3.5–5.1)
RBC # BLD AUTO: 4.24 M/UL (ref 3.8–5.2)
SAO2 % BLD: 96 % (ref 92–97)
SODIUM SERPL-SCNC: 139 MMOL/L (ref 136–145)
SPECIMEN TYPE: ABNORMAL
TOTAL RESP. RATE, ITRR: 23
TROPONIN I SERPL-MCNC: <0.05 NG/ML
WBC # BLD AUTO: 8.8 K/UL (ref 3.6–11)

## 2019-09-13 PROCEDURE — 77030029684 HC NEB SM VOL KT MONA -A

## 2019-09-13 PROCEDURE — 96374 THER/PROPH/DIAG INJ IV PUSH: CPT

## 2019-09-13 PROCEDURE — 80048 BASIC METABOLIC PNL TOTAL CA: CPT

## 2019-09-13 PROCEDURE — 99285 EMERGENCY DEPT VISIT HI MDM: CPT

## 2019-09-13 PROCEDURE — 74011000250 HC RX REV CODE- 250: Performed by: EMERGENCY MEDICINE

## 2019-09-13 PROCEDURE — 5A09457 ASSISTANCE WITH RESPIRATORY VENTILATION, 24-96 CONSECUTIVE HOURS, CONTINUOUS POSITIVE AIRWAY PRESSURE: ICD-10-PCS | Performed by: INTERNAL MEDICINE

## 2019-09-13 PROCEDURE — 71045 X-RAY EXAM CHEST 1 VIEW: CPT

## 2019-09-13 PROCEDURE — 77030013140 HC MSK NEB VYRM -A

## 2019-09-13 PROCEDURE — 36415 COLL VENOUS BLD VENIPUNCTURE: CPT

## 2019-09-13 PROCEDURE — 77030012879 HC MSK CPAP FLL FAC PHIL -B

## 2019-09-13 PROCEDURE — 82803 BLOOD GASES ANY COMBINATION: CPT

## 2019-09-13 PROCEDURE — 94660 CPAP INITIATION&MGMT: CPT

## 2019-09-13 PROCEDURE — 85027 COMPLETE CBC AUTOMATED: CPT

## 2019-09-13 PROCEDURE — 36600 WITHDRAWAL OF ARTERIAL BLOOD: CPT

## 2019-09-13 PROCEDURE — 93005 ELECTROCARDIOGRAM TRACING: CPT

## 2019-09-13 PROCEDURE — 94640 AIRWAY INHALATION TREATMENT: CPT

## 2019-09-13 PROCEDURE — 84484 ASSAY OF TROPONIN QUANT: CPT

## 2019-09-13 PROCEDURE — 74011250636 HC RX REV CODE- 250/636: Performed by: EMERGENCY MEDICINE

## 2019-09-13 RX ORDER — IPRATROPIUM BROMIDE AND ALBUTEROL SULFATE 2.5; .5 MG/3ML; MG/3ML
3 SOLUTION RESPIRATORY (INHALATION)
Status: COMPLETED | OUTPATIENT
Start: 2019-09-13 | End: 2019-09-13

## 2019-09-13 RX ADMIN — IPRATROPIUM BROMIDE AND ALBUTEROL SULFATE 3 ML: .5; 3 SOLUTION RESPIRATORY (INHALATION) at 22:39

## 2019-09-13 RX ADMIN — METHYLPREDNISOLONE SODIUM SUCCINATE 125 MG: 125 INJECTION, POWDER, FOR SOLUTION INTRAMUSCULAR; INTRAVENOUS at 22:30

## 2019-09-13 RX ADMIN — IPRATROPIUM BROMIDE AND ALBUTEROL SULFATE 3 ML: .5; 3 SOLUTION RESPIRATORY (INHALATION) at 22:43

## 2019-09-14 ENCOUNTER — APPOINTMENT (OUTPATIENT)
Dept: NON INVASIVE DIAGNOSTICS | Age: 49
DRG: 189 | End: 2019-09-14
Attending: INTERNAL MEDICINE
Payer: MEDICARE

## 2019-09-14 LAB
ANION GAP SERPL CALC-SCNC: 5 MMOL/L (ref 5–15)
ARTERIAL PATENCY WRIST A: YES
BASE EXCESS BLD CALC-SCNC: 6 MMOL/L
BASOPHILS # BLD: 0 K/UL (ref 0–0.1)
BASOPHILS NFR BLD: 0 % (ref 0–1)
BDY SITE: ABNORMAL
BUN SERPL-MCNC: 19 MG/DL (ref 6–20)
BUN/CREAT SERPL: 22 (ref 12–20)
CALCIUM SERPL-MCNC: 10.1 MG/DL (ref 8.5–10.1)
CHLORIDE SERPL-SCNC: 100 MMOL/L (ref 97–108)
CO2 SERPL-SCNC: 32 MMOL/L (ref 21–32)
COMMENT, HOLDF: NORMAL
CREAT SERPL-MCNC: 0.86 MG/DL (ref 0.55–1.02)
DIFFERENTIAL METHOD BLD: ABNORMAL
EOSINOPHIL # BLD: 0 K/UL (ref 0–0.4)
EOSINOPHIL NFR BLD: 0 % (ref 0–7)
ERYTHROCYTE [DISTWIDTH] IN BLOOD BY AUTOMATED COUNT: 13.1 % (ref 11.5–14.5)
EST. AVERAGE GLUCOSE BLD GHB EST-MCNC: 128 MG/DL
GAS FLOW.O2 O2 DELIVERY SYS: ABNORMAL L/MIN
GLUCOSE BLD STRIP.AUTO-MCNC: 166 MG/DL (ref 65–100)
GLUCOSE BLD STRIP.AUTO-MCNC: 186 MG/DL (ref 65–100)
GLUCOSE BLD STRIP.AUTO-MCNC: 213 MG/DL (ref 65–100)
GLUCOSE BLD STRIP.AUTO-MCNC: 239 MG/DL (ref 65–100)
GLUCOSE SERPL-MCNC: 250 MG/DL (ref 65–100)
HBA1C MFR BLD: 6.1 % (ref 4.2–6.3)
HCO3 BLD-SCNC: 32.6 MMOL/L (ref 22–26)
HCT VFR BLD AUTO: 40.8 % (ref 35–47)
HGB BLD-MCNC: 11.9 G/DL (ref 11.5–16)
IMM GRANULOCYTES # BLD AUTO: 0 K/UL (ref 0–0.04)
IMM GRANULOCYTES NFR BLD AUTO: 0 % (ref 0–0.5)
LYMPHOCYTES # BLD: 0.4 K/UL (ref 0.8–3.5)
LYMPHOCYTES NFR BLD: 4 % (ref 12–49)
MCH RBC QN AUTO: 27.5 PG (ref 26–34)
MCHC RBC AUTO-ENTMCNC: 29.2 G/DL (ref 30–36.5)
MCV RBC AUTO: 94.4 FL (ref 80–99)
MONOCYTES # BLD: 0.1 K/UL (ref 0–1)
MONOCYTES NFR BLD: 1 % (ref 5–13)
NEUTS SEG # BLD: 8.7 K/UL (ref 1.8–8)
NEUTS SEG NFR BLD: 95 % (ref 32–75)
NRBC # BLD: 0 K/UL (ref 0–0.01)
NRBC BLD-RTO: 0 PER 100 WBC
O2/TOTAL GAS SETTING VFR VENT: 40 %
PCO2 BLD: 63.3 MMHG (ref 35–45)
PEEP RESPIRATORY: 6 CMH2O
PH BLD: 7.32 [PH] (ref 7.35–7.45)
PIP ISTAT,IPIP: 20
PLATELET # BLD AUTO: 137 K/UL (ref 150–400)
PMV BLD AUTO: 11.4 FL (ref 8.9–12.9)
PO2 BLD: 86 MMHG (ref 80–100)
POTASSIUM SERPL-SCNC: 4.4 MMOL/L (ref 3.5–5.1)
PRESSURE CONTROL, IPC: YES
RBC # BLD AUTO: 4.32 M/UL (ref 3.8–5.2)
RBC MORPH BLD: ABNORMAL
SAMPLES BEING HELD,HOLD: NORMAL
SAO2 % BLD: 95 % (ref 92–97)
SERVICE CMNT-IMP: ABNORMAL
SODIUM SERPL-SCNC: 137 MMOL/L (ref 136–145)
SPECIMEN TYPE: ABNORMAL
TOTAL RESP. RATE, ITRR: 20
TROPONIN I SERPL-MCNC: <0.05 NG/ML
WBC # BLD AUTO: 9.2 K/UL (ref 3.6–11)
WBC MORPH BLD: ABNORMAL

## 2019-09-14 PROCEDURE — 74011250637 HC RX REV CODE- 250/637: Performed by: INTERNAL MEDICINE

## 2019-09-14 PROCEDURE — 74011636637 HC RX REV CODE- 636/637: Performed by: HOSPITALIST

## 2019-09-14 PROCEDURE — 94640 AIRWAY INHALATION TREATMENT: CPT

## 2019-09-14 PROCEDURE — 77010033678 HC OXYGEN DAILY

## 2019-09-14 PROCEDURE — 84484 ASSAY OF TROPONIN QUANT: CPT

## 2019-09-14 PROCEDURE — 94660 CPAP INITIATION&MGMT: CPT

## 2019-09-14 PROCEDURE — 65660000001 HC RM ICU INTERMED STEPDOWN

## 2019-09-14 PROCEDURE — 74011000250 HC RX REV CODE- 250: Performed by: INTERNAL MEDICINE

## 2019-09-14 PROCEDURE — 74011636637 HC RX REV CODE- 636/637: Performed by: INTERNAL MEDICINE

## 2019-09-14 PROCEDURE — 74011250636 HC RX REV CODE- 250/636: Performed by: INTERNAL MEDICINE

## 2019-09-14 PROCEDURE — 93306 TTE W/DOPPLER COMPLETE: CPT

## 2019-09-14 PROCEDURE — 74011250637 HC RX REV CODE- 250/637: Performed by: HOSPITALIST

## 2019-09-14 PROCEDURE — 85025 COMPLETE CBC W/AUTO DIFF WBC: CPT

## 2019-09-14 PROCEDURE — 97161 PT EVAL LOW COMPLEX 20 MIN: CPT

## 2019-09-14 PROCEDURE — 83036 HEMOGLOBIN GLYCOSYLATED A1C: CPT

## 2019-09-14 PROCEDURE — 97110 THERAPEUTIC EXERCISES: CPT

## 2019-09-14 PROCEDURE — 82962 GLUCOSE BLOOD TEST: CPT

## 2019-09-14 PROCEDURE — 80048 BASIC METABOLIC PNL TOTAL CA: CPT

## 2019-09-14 PROCEDURE — 36415 COLL VENOUS BLD VENIPUNCTURE: CPT

## 2019-09-14 PROCEDURE — 82803 BLOOD GASES ANY COMBINATION: CPT

## 2019-09-14 PROCEDURE — 97116 GAIT TRAINING THERAPY: CPT

## 2019-09-14 PROCEDURE — 36600 WITHDRAWAL OF ARTERIAL BLOOD: CPT

## 2019-09-14 RX ORDER — BUDESONIDE 0.5 MG/2ML
1000 INHALANT ORAL
Status: DISCONTINUED | OUTPATIENT
Start: 2019-09-14 | End: 2019-09-16 | Stop reason: HOSPADM

## 2019-09-14 RX ORDER — NITROGLYCERIN 0.4 MG/1
0.4 TABLET SUBLINGUAL AS NEEDED
Status: DISCONTINUED | OUTPATIENT
Start: 2019-09-14 | End: 2019-09-16 | Stop reason: HOSPADM

## 2019-09-14 RX ORDER — ONDANSETRON 2 MG/ML
4 INJECTION INTRAMUSCULAR; INTRAVENOUS
Status: DISCONTINUED | OUTPATIENT
Start: 2019-09-14 | End: 2019-09-16 | Stop reason: HOSPADM

## 2019-09-14 RX ORDER — CYCLOBENZAPRINE HCL 10 MG
5 TABLET ORAL
Status: DISCONTINUED | OUTPATIENT
Start: 2019-09-14 | End: 2019-09-16 | Stop reason: HOSPADM

## 2019-09-14 RX ORDER — SODIUM CHLORIDE 0.9 % (FLUSH) 0.9 %
5-40 SYRINGE (ML) INJECTION AS NEEDED
Status: DISCONTINUED | OUTPATIENT
Start: 2019-09-14 | End: 2019-09-16 | Stop reason: HOSPADM

## 2019-09-14 RX ORDER — IPRATROPIUM BROMIDE AND ALBUTEROL SULFATE 2.5; .5 MG/3ML; MG/3ML
3 SOLUTION RESPIRATORY (INHALATION)
Status: DISCONTINUED | OUTPATIENT
Start: 2019-09-14 | End: 2019-09-16 | Stop reason: HOSPADM

## 2019-09-14 RX ORDER — IPRATROPIUM BROMIDE AND ALBUTEROL SULFATE 2.5; .5 MG/3ML; MG/3ML
3 SOLUTION RESPIRATORY (INHALATION)
Status: DISCONTINUED | OUTPATIENT
Start: 2019-09-14 | End: 2019-09-14

## 2019-09-14 RX ORDER — RANOLAZINE 500 MG/1
500 TABLET, EXTENDED RELEASE ORAL 2 TIMES DAILY
Status: DISCONTINUED | OUTPATIENT
Start: 2019-09-14 | End: 2019-09-16 | Stop reason: HOSPADM

## 2019-09-14 RX ORDER — AZITHROMYCIN 250 MG/1
500 TABLET, FILM COATED ORAL
Status: COMPLETED | OUTPATIENT
Start: 2019-09-14 | End: 2019-09-14

## 2019-09-14 RX ORDER — GUAIFENESIN 100 MG/5ML
81 LIQUID (ML) ORAL DAILY
Status: DISCONTINUED | OUTPATIENT
Start: 2019-09-14 | End: 2019-09-16 | Stop reason: HOSPADM

## 2019-09-14 RX ORDER — ACETAMINOPHEN 325 MG/1
650 TABLET ORAL
Status: DISCONTINUED | OUTPATIENT
Start: 2019-09-14 | End: 2019-09-16 | Stop reason: HOSPADM

## 2019-09-14 RX ORDER — FLUTICASONE PROPIONATE 50 MCG
2 SPRAY, SUSPENSION (ML) NASAL DAILY
Status: DISCONTINUED | OUTPATIENT
Start: 2019-09-14 | End: 2019-09-16 | Stop reason: HOSPADM

## 2019-09-14 RX ORDER — BUDESONIDE 0.5 MG/2ML
1000 INHALANT ORAL 2 TIMES DAILY
Status: DISCONTINUED | OUTPATIENT
Start: 2019-09-14 | End: 2019-09-14

## 2019-09-14 RX ORDER — ENOXAPARIN SODIUM 100 MG/ML
40 INJECTION SUBCUTANEOUS EVERY 12 HOURS
Status: DISCONTINUED | OUTPATIENT
Start: 2019-09-14 | End: 2019-09-16 | Stop reason: HOSPADM

## 2019-09-14 RX ORDER — FUROSEMIDE 40 MG/1
40 TABLET ORAL 2 TIMES DAILY
Status: DISCONTINUED | OUTPATIENT
Start: 2019-09-14 | End: 2019-09-16 | Stop reason: HOSPADM

## 2019-09-14 RX ORDER — BISACODYL 5 MG
5 TABLET, DELAYED RELEASE (ENTERIC COATED) ORAL DAILY PRN
Status: DISCONTINUED | OUTPATIENT
Start: 2019-09-14 | End: 2019-09-16 | Stop reason: HOSPADM

## 2019-09-14 RX ORDER — GLYBURIDE 5 MG/1
5 TABLET ORAL 2 TIMES DAILY WITH MEALS
Status: DISCONTINUED | OUTPATIENT
Start: 2019-09-14 | End: 2019-09-16 | Stop reason: HOSPADM

## 2019-09-14 RX ORDER — MAGNESIUM SULFATE 100 %
4 CRYSTALS MISCELLANEOUS AS NEEDED
Status: DISCONTINUED | OUTPATIENT
Start: 2019-09-14 | End: 2019-09-16 | Stop reason: HOSPADM

## 2019-09-14 RX ORDER — ATORVASTATIN CALCIUM 20 MG/1
20 TABLET, FILM COATED ORAL
Status: DISCONTINUED | OUTPATIENT
Start: 2019-09-14 | End: 2019-09-16 | Stop reason: HOSPADM

## 2019-09-14 RX ORDER — METOPROLOL TARTRATE 25 MG/1
25 TABLET, FILM COATED ORAL 2 TIMES DAILY
Status: DISCONTINUED | OUTPATIENT
Start: 2019-09-14 | End: 2019-09-16 | Stop reason: HOSPADM

## 2019-09-14 RX ORDER — NYSTATIN 100000 [USP'U]/G
POWDER TOPICAL 2 TIMES DAILY
Status: DISCONTINUED | OUTPATIENT
Start: 2019-09-14 | End: 2019-09-16 | Stop reason: HOSPADM

## 2019-09-14 RX ORDER — LORATADINE 10 MG/1
10 TABLET ORAL DAILY
Status: DISCONTINUED | OUTPATIENT
Start: 2019-09-14 | End: 2019-09-16 | Stop reason: HOSPADM

## 2019-09-14 RX ORDER — DEXTROSE MONOHYDRATE 100 MG/ML
0-250 INJECTION, SOLUTION INTRAVENOUS AS NEEDED
Status: DISCONTINUED | OUTPATIENT
Start: 2019-09-14 | End: 2019-09-16 | Stop reason: HOSPADM

## 2019-09-14 RX ORDER — SODIUM CHLORIDE 0.9 % (FLUSH) 0.9 %
5-40 SYRINGE (ML) INJECTION EVERY 8 HOURS
Status: DISCONTINUED | OUTPATIENT
Start: 2019-09-14 | End: 2019-09-16 | Stop reason: HOSPADM

## 2019-09-14 RX ORDER — INSULIN LISPRO 100 [IU]/ML
INJECTION, SOLUTION INTRAVENOUS; SUBCUTANEOUS
Status: DISCONTINUED | OUTPATIENT
Start: 2019-09-14 | End: 2019-09-16 | Stop reason: HOSPADM

## 2019-09-14 RX ORDER — INSULIN GLARGINE 100 [IU]/ML
45 INJECTION, SOLUTION SUBCUTANEOUS DAILY
Status: DISCONTINUED | OUTPATIENT
Start: 2019-09-14 | End: 2019-09-16 | Stop reason: HOSPADM

## 2019-09-14 RX ORDER — SODIUM CHLORIDE 9 MG/ML
75 INJECTION, SOLUTION INTRAVENOUS CONTINUOUS
Status: DISCONTINUED | OUTPATIENT
Start: 2019-09-14 | End: 2019-09-14

## 2019-09-14 RX ADMIN — GLYBURIDE 5 MG: 5 TABLET ORAL at 17:58

## 2019-09-14 RX ADMIN — ENOXAPARIN SODIUM 40 MG: 40 INJECTION SUBCUTANEOUS at 17:58

## 2019-09-14 RX ADMIN — AZITHROMYCIN MONOHYDRATE 500 MG: 250 TABLET ORAL at 11:36

## 2019-09-14 RX ADMIN — Medication 10 ML: at 21:50

## 2019-09-14 RX ADMIN — INSULIN LISPRO 2 UNITS: 100 INJECTION, SOLUTION INTRAVENOUS; SUBCUTANEOUS at 16:30

## 2019-09-14 RX ADMIN — INSULIN LISPRO 3 UNITS: 100 INJECTION, SOLUTION INTRAVENOUS; SUBCUTANEOUS at 12:18

## 2019-09-14 RX ADMIN — SODIUM CHLORIDE 75 ML/HR: 900 INJECTION, SOLUTION INTRAVENOUS at 01:24

## 2019-09-14 RX ADMIN — NYSTATIN: 100000 POWDER TOPICAL at 17:57

## 2019-09-14 RX ADMIN — LORATADINE 10 MG: 10 TABLET ORAL at 11:35

## 2019-09-14 RX ADMIN — INSULIN GLARGINE 45 UNITS: 100 INJECTION, SOLUTION SUBCUTANEOUS at 11:00

## 2019-09-14 RX ADMIN — FLUTICASONE PROPIONATE 2 SPRAY: 50 SPRAY, METERED NASAL at 11:36

## 2019-09-14 RX ADMIN — ENOXAPARIN SODIUM 40 MG: 40 INJECTION SUBCUTANEOUS at 06:22

## 2019-09-14 RX ADMIN — FUROSEMIDE 40 MG: 40 TABLET ORAL at 17:58

## 2019-09-14 RX ADMIN — INSULIN LISPRO 3 UNITS: 100 INJECTION, SOLUTION INTRAVENOUS; SUBCUTANEOUS at 09:15

## 2019-09-14 RX ADMIN — BUDESONIDE 1000 MCG: 0.5 INHALANT RESPIRATORY (INHALATION) at 20:11

## 2019-09-14 RX ADMIN — CYCLOBENZAPRINE HYDROCHLORIDE 5 MG: 10 TABLET, FILM COATED ORAL at 12:18

## 2019-09-14 RX ADMIN — Medication 10 ML: at 14:00

## 2019-09-14 RX ADMIN — GLYBURIDE 5 MG: 5 TABLET ORAL at 11:35

## 2019-09-14 RX ADMIN — RANOLAZINE 500 MG: 500 TABLET, FILM COATED, EXTENDED RELEASE ORAL at 11:35

## 2019-09-14 RX ADMIN — RANOLAZINE 500 MG: 500 TABLET, FILM COATED, EXTENDED RELEASE ORAL at 17:58

## 2019-09-14 RX ADMIN — METOPROLOL TARTRATE 25 MG: 25 TABLET ORAL at 17:58

## 2019-09-14 RX ADMIN — IPRATROPIUM BROMIDE AND ALBUTEROL SULFATE 3 ML: .5; 3 SOLUTION RESPIRATORY (INHALATION) at 14:46

## 2019-09-14 RX ADMIN — FUROSEMIDE 40 MG: 40 TABLET ORAL at 11:35

## 2019-09-14 RX ADMIN — IPRATROPIUM BROMIDE AND ALBUTEROL SULFATE 3 ML: .5; 3 SOLUTION RESPIRATORY (INHALATION) at 20:12

## 2019-09-14 RX ADMIN — Medication 10 ML: at 03:46

## 2019-09-14 RX ADMIN — ATORVASTATIN CALCIUM 20 MG: 20 TABLET, FILM COATED ORAL at 21:42

## 2019-09-14 RX ADMIN — METOPROLOL TARTRATE 25 MG: 25 TABLET ORAL at 11:36

## 2019-09-14 RX ADMIN — ASPIRIN 81 MG 81 MG: 81 TABLET ORAL at 11:35

## 2019-09-14 NOTE — PROGRESS NOTES
ADULT PROTOCOL: JET AEROSOL ASSESSMENT    Patient  Pb Araiza     54 y.o.   female     9/14/2019  3:20 PM    Breath Sounds Pre Procedure: Right Breath Sounds: Diminished                               Left Breath Sounds: Diminished    Breath Sounds Post Procedure: Right Breath Sounds: Diminished                                 Left Breath Sounds: Diminished    Breathing pattern: Pre procedure Breathing Pattern: Regular          Post procedure Breathing Pattern: Regular    Heart Rate: Pre procedure Pulse: 75           Post procedure Pulse: 73    Resp Rate: Pre procedure Respirations: 22           Post procedure Respirations: 20     Oxygen: O2 Device: Nasal cannula   5L    SpO2: Pre procedure SpO2: 91 %                 Post procedure SpO2: 90 %      Nebulizer Therapy: Current medications Aerosolized Medications: DuoNeb    Problem List:   Patient Active Problem List   Diagnosis Code    Malignant essential hypertension I10    Asthma J45.909    Obesity hypoventilation syndrome ([facility]) E66.2    Dyspnea R06.00    Chest pressure R07.89    Acute on chronic diastolic heart failure ([facility]) I50.33    NSTEMI (non-ST elevated myocardial infarction) ([facility]) I21.4    S/P PTCA (percutaneous transluminal coronary angioplasty) Z98.61    Coronary atherosclerosis of native coronary artery I25.10    Hypoxia R09.02    Noncompliance with therapeutic plan Z91.11    Chest pain R07.9    GERD (gastroesophageal reflux disease) K21.9    Acute respiratory failure with hypoxia and hypercarbia ([facility]) J96.01, J96.02    COPD (chronic obstructive pulmonary disease) ([facility]) J44.9    Tobacco abuse Z72.0    BMI 60.0-69.9, adult ([facility]) Z68.44    Cellulitis L03.90    SIRS due to infectious process with acute organ dysfunction ([facility]) A41.9, R65.20    Sepsis associated hypotension ([facility]) A41.9, I95.9    Gangrenous toe ([facility]) I96    Type II diabetes mellitus, uncontrolled ([facility]) E11.65    HTN (hypertension) X52    Diastolic CHF, chronic ([facility]) I50.32    Cough with hemoptysis R04.2    Lymphedema of both lower extremities I89.0    CAP (community acquired pneumonia) J18.9    Cellulitis, leg L03.119    COPD exacerbation ([facility]) J44.1    Maggot infestation B87.9    Shortness of breath R06.02    Multifocal pneumonia J18.9    Acute respiratory failure (HCC) J96.00    Acute on chronic respiratory failure with hypoxia and hypercapnia (HCC) J96.21, J96.22    NSVT (nonsustained ventricular tachycardia) ([facility]) I47.2    Respiratory failure (HCC) J96.90    Elevated lipase R74.8    Abdominal pain R10.9    Counseling regarding goals of care Z71.89    Acute pancreatitis K85.90    Breast cancer ([facility]) C50.919    Intertrigo L30.4    PNA (pneumonia) J18.9    Productive cough R05    Other fatigue R53.83    Acute chest pain R07.9    Acute on chronic respiratory failure with hypoxia (HCC) J96.21    Acute on chronic respiratory failure with hypercapnia ([address] J96.22       Respiratory Therapist: Ángel Jones

## 2019-09-14 NOTE — PROGRESS NOTES
Pt came to the floor very demanding. He had a long list of requirements. The bed, chair, the way the tv sits, the commode, charging her phone, and list goes on. I attempted to communicate that we need help her breath, she needed the bipap. She said she didn't care. I let her know that she needed to put her bipap on before I completed the list.  She became irate when she was told that she will not be feed breakfast.  After being off bipap for long time, she wanted ABGs done. I let her know that because she was not compliant we would wait for her keep her mask on for two hours before we completed abgs. She was upset about not eating breakfast.  I encouraged her to keep her mask on and talk to the doctor. She told me I would have to give her ice. I let her know that goal was to keep the mask on. Using ice would result in her taking off her mask. She desated to the 70s and maintain for 20 minutes before she would allow us to put the bipap mask. Her comforts and demands were more important than breathing. She requested to get up without bipap. She be came irrate when she asked for water and I let her know that she would have to wait for orders from the doctor. She refused bp cuff. She threaten to drink from the fountian. She said that this never happens to her and it is not right. I called and spoke to Doctor Olu Burgess. Let him know she is waiting for him and she feels this is not acceptable.

## 2019-09-14 NOTE — PROGRESS NOTES
Problem: Breathing Pattern - Ineffective  Goal: *Absence of hypoxia  Outcome: Progressing Towards Goal     Problem: Chronic Obstructive Pulmonary Disease (COPD)  Goal: *Oxygen saturation during activity within specified parameters  Outcome: Progressing Towards Goal  Goal: *Able to remain out of bed as prescribed  Outcome: Progressing Towards Goal  Goal: *Absence of hypoxia  Outcome: Progressing Towards Goal  Goal: *Optimize nutritional status  Outcome: Progressing Towards Goal     Problem: Risk for Spread of Infection  Goal: Prevent transmission of infectious organism to others  Description  Prevent the transmission of infectious organisms to other patients, staff members, and visitors. Outcome: Progressing Towards Goal     Problem: Falls - Risk of  Goal: *Absence of Falls  Description  Document Ruthie Tim Fall Risk and appropriate interventions in the flowsheet. Outcome: Progressing Towards Goal  Note:   Fall Risk Interventions:  Mobility Interventions: Bed/chair exit alarm, OT consult for ADLs, PT Consult for mobility concerns, PT Consult for assist device competence, Utilize walker, cane, or other assistive device         Medication Interventions: Patient to call before getting OOB, Teach patient to arise slowly    Elimination Interventions: Call light in reach              Problem: Pressure Injury - Risk of  Goal: *Prevention of pressure injury  Description  Document Jeffrey Scale and appropriate interventions in the flowsheet. Outcome: Progressing Towards Goal  Note:   Pressure Injury Interventions:  Sensory Interventions: Chair cushion, Float heels, Keep linens dry and wrinkle-free, Minimize linen layers, Turn and reposition approx.  every two hours (pillows and wedges if needed)    Moisture Interventions: Absorbent underpads, Apply protective barrier, creams and emollients, Check for incontinence Q2 hours and as needed, Moisture barrier, Minimize layers    Activity Interventions: Increase time out of bed, Pressure redistribution bed/mattress(bed type), PT/OT evaluation    Mobility Interventions: Float heels, Pressure redistribution bed/mattress (bed type), PT/OT evaluation, Turn and reposition approx.  every two hours(pillow and wedges)    Nutrition Interventions: Document food/fluid/supplement intake    Friction and Shear Interventions: Apply protective barrier, creams and emollients, Lift sheet, Lift team/patient mobility team

## 2019-09-14 NOTE — ED NOTES
Patient presents to the ED via EMS complaining of difficulty breathing and chest pain. EMS reports the patient was consuming dinner and started to experience middle chest pain along with shortness of breath. Patient describes the pain as burning and in her neck and arm. Patient reports wearing home oxygen 5L due to COPD dx. Patient refused to be placed on a stretcher and request a chair. Patient also request to have ABGs completed. Patient advised the MD would come in to see her soon and order appropriate labs. Patient placed on the monitor x3 and provided with her call bell.

## 2019-09-14 NOTE — PROGRESS NOTES
Problem: Heart Failure: Day 1  Goal: Diagnostic Test/Procedures  Outcome: Progressing Towards Goal  Goal: Nutrition/Diet  Outcome: Progressing Towards Goal  Goal: Medications  Outcome: Progressing Towards Goal  Goal: Respiratory  Outcome: Progressing Towards Goal

## 2019-09-14 NOTE — ED NOTES
Patient is still consuming dinner. Patient advised her oxygen stats are still at 88% on 5L via nasal cannula. Patient states \"I know, I will fix it after dinner\". MD notified.

## 2019-09-14 NOTE — PROGRESS NOTES
0315: Patient transported to [facility] 2290 via 5 L/min.  0329: Placed on BIPAP.

## 2019-09-14 NOTE — ED PROVIDER NOTES
EMERGENCY DEPARTMENT HISTORY AND PHYSICAL EXAM      Date: 9/13/2019  Patient Name: Bari Cohen  Patient Age and Sex: 54 y.o. female     History of Presenting Illness     No chief complaint on file. History Provided By: Patient    HPI: Bari Cohen is a 53-year-old female with a history of COPD, morbid obesity, presenting with chest pain and shortness of breath. Patient states that for the past few days has been having shortness of breath worse with exertion and associated with a slight cough. States that she started also having midsternal and epigastric pain and burning starting yesterday. Today around 8 PM it got worse after she ate something. States she does take medications for GERD but does not remember the name. Has been taking her COPD medications as well. States that her biggest concern is that her air conditioning has not been working and so she has not been using her CPAP machine since the very hot to use. Denies any fevers, nausea, vomiting, AMS. But has been haivng a mild HA and concerned about her CO2    There are no other complaints, changes, or physical findings at this time. PCP: Natan Barnhart MD    No current facility-administered medications on file prior to encounter. Current Outpatient Medications on File Prior to Encounter   Medication Sig Dispense Refill    alum-mag hydroxide-simeth (MAALOX ADVANCED) 200-200-20 mg/5 mL susp Take 30 mL by mouth every four (4) hours as needed for Pain. 354 mL 0    ranolazine ER (RANEXA) 500 mg SR tablet Take 1 Tab by mouth two (2) times a day. 60 Tab 8    cyclobenzaprine (FLEXERIL) 5 mg tablet Take 1 Tab by mouth two (2) times daily as needed for Muscle Spasm(s). flexeriol 5 Tab 0    albuterol-ipratropium (DUO-NEB) 2.5 mg-0.5 mg/3 ml nebu 3 mL by Nebulization route four (4) times daily. 100 Nebule 1    furosemide (LASIX) 40 mg tablet Take 40 mg by mouth two (2) times a day.       nystatin (MYCOSTATIN) topical cream Apply to affected area two (2) times a day.  nystatin (MYCOSTATIN) powder Apply  to affected area two (2) times a day.  fluticasone (FLONASE) 50 mcg/actuation nasal spray 2 Sprays by Both Nostrils route daily. 1 Bottle 0    insulin glargine (LANTUS) 100 unit/mL injection 45 Units by SubCUTAneous route daily.  metoprolol tartrate (LOPRESSOR) 25 mg tablet Take 25 mg by mouth two (2) times a day.  fluticasone-salmeterol (ADVAIR DISKUS) 500-50 mcg/dose diskus inhaler Take 1 Puff by inhalation every twelve (12) hours.  loratadine (CLARITIN) 10 mg tablet Take 10 mg by mouth daily.  albuterol (VENTOLIN HFA) 90 mcg/actuation inhaler Take 2 Puffs by inhalation every six (6) hours as needed for Wheezing.  aspirin 81 mg chewable tablet Take 81 mg by mouth daily.  hydrOXYzine pamoate (VISTARIL) 50 mg capsule Take 50 mg by mouth every eight (8) hours as needed for Itching.  lovastatin (MEVACOR) 40 mg tablet Take 1 Tab by mouth nightly. 30 Tab 6    glyBURIDE (DIABETA) 5 mg tablet Take 5 mg by mouth two (2) times daily (with meals).  ammonium lactate (LAC-HYDRIN) 12 % topical cream rub in to affected area well 280 g 1    nitroglycerin (NITROSTAT) 0.4 mg SL tablet 1 Tab by SubLINGual route every five (5) minutes as needed for Chest Pain (call 911 if not relieved by 3).  25 Tab 2       Past History     Past Medical History:  Past Medical History:   Diagnosis Date    Arthritis     Asthma     Breast lump     CAD (coronary artery disease)     Congestive heart failure (HCC)     COPD (chronic obstructive pulmonary disease) (HCC)     Diabetes [facility]     Hypertension     Morbid obesity with BMI of 70 and over, adult ([facility]     NSTEMI (non-ST elevated myocardial infarction) ([facility]     SVT (supraventricular tachycardia) ([facility]        Past Surgical History:  Past Surgical History:   Procedure Laterality Date    CARDIAC SURG PROCEDURE UNLIST      Stents    HX  SECTION      HX ORTHOPAEDIC      HX OTHER SURGICAL      cyst removed from back       Family History:  Family History   Problem Relation Age of Onset    Heart Disease Mother     Heart Disease Father     Heart Disease Sister     Breast Cancer Maternal Grandmother     Breast Cancer Paternal Grandmother        Social History:  Social History     Tobacco Use    Smoking status: Former Smoker     Packs/day: 0.25     Types: Cigarettes    Smokeless tobacco: Never Used    Tobacco comment: Patient states \"I  aint smoking no more d*mn cigarettes after yesterday\" 01/26/2018   Substance Use Topics    Alcohol use: No    Drug use: No       Allergies:  No Known Allergies      Review of Systems   Review of Systems   Constitutional: Negative for chills and fever. Respiratory: Positive for cough and shortness of breath. Cardiovascular: Positive for chest pain. Gastrointestinal: Positive for abdominal pain. Negative for constipation, diarrhea, nausea and vomiting. Neurological: Negative for weakness and numbness. All other systems reviewed and are negative. Physical Exam   Physical Exam   Constitutional: She is oriented to person, place, and time. She appears well-developed and well-nourished. Morbidly obese female   HENT:   Head: Normocephalic and atraumatic. Eyes: Conjunctivae and EOM are normal.   Neck: Normal range of motion. Neck supple. Cardiovascular: Normal rate and regular rhythm. Pulmonary/Chest: Effort normal and breath sounds normal. No respiratory distress. Poor air movement diffusely   Abdominal: Soft. She exhibits no distension. There is no tenderness. Musculoskeletal: Normal range of motion. Neurological: She is alert and oriented to person, place, and time. Skin: Skin is warm and dry. Eczema   Psychiatric: She has a normal mood and affect. Nursing note and vitals reviewed.        Diagnostic Study Results     Labs -     Recent Results (from the past 12 hour(s))   EKG, 12 LEAD, INITIAL Collection Time: 09/13/19  9:14 PM   Result Value Ref Range    Ventricular Rate 72 BPM    Atrial Rate 72 BPM    P-R Interval 192 ms    QRS Duration 92 ms    Q-T Interval 388 ms    QTC Calculation (Bezet) 424 ms    Calculated P Axis 37 degrees    Calculated R Axis 60 degrees    Calculated T Axis 36 degrees    Diagnosis       Normal sinus rhythm  Low voltage QRS  When compared with ECG of 29-AUG-2019 22:21,  No significant change was found     POC G3 - PUL    Collection Time: 09/13/19 10:05 PM   Result Value Ref Range    pH (POC) 7.318 (L) 7.35 - 7.45      pCO2 (POC) 75.7 (H) 35.0 - 45.0 MMHG    pO2 (POC) 94 80 - 100 MMHG    HCO3 (POC) 38.8 (H) 22 - 26 MMOL/L    sO2 (POC) 96 92 - 97 %    Base excess (POC) 13 mmol/L    Site RIGHT RADIAL      Device: NASAL CANNULA      Flow rate (POC) 5 L/M    Allens test (POC) YES      Specimen type (POC) ARTERIAL      Total resp.  rate 23     CBC W/O DIFF    Collection Time: 09/13/19 10:28 PM   Result Value Ref Range    WBC 8.8 3.6 - 11.0 K/uL    RBC 4.24 3.80 - 5.20 M/uL    HGB 11.9 11.5 - 16.0 g/dL    HCT 39.8 35.0 - 47.0 %    MCV 93.9 80.0 - 99.0 FL    MCH 28.1 26.0 - 34.0 PG    MCHC 29.9 (L) 30.0 - 36.5 g/dL    RDW 13.3 11.5 - 14.5 %    PLATELET 533 207 - 807 K/uL    MPV 11.6 8.9 - 12.9 FL    NRBC 0.0 0  WBC    ABSOLUTE NRBC 0.00 0.00 - 6.37 K/uL   METABOLIC PANEL, BASIC    Collection Time: 09/13/19 10:28 PM   Result Value Ref Range    Sodium 139 136 - 145 mmol/L    Potassium 4.2 3.5 - 5.1 mmol/L    Chloride 102 97 - 108 mmol/L    CO2 36 (H) 21 - 32 mmol/L    Anion gap 1 (L) 5 - 15 mmol/L    Glucose 111 (H) 65 - 100 mg/dL    BUN 18 6 - 20 MG/DL    Creatinine 0.72 0.55 - 1.02 MG/DL    BUN/Creatinine ratio 25 (H) 12 - 20      GFR est AA >60 >60 ml/min/1.73m2    GFR est non-AA >60 >60 ml/min/1.73m2    Calcium 9.7 8.5 - 10.1 MG/DL   TROPONIN I    Collection Time: 09/13/19 10:28 PM   Result Value Ref Range    Troponin-I, Qt. <0.05 <0.05 ng/mL       Radiologic Studies -   XR CHEST PORT   Final Result   IMPRESSION: Limited exam   1. No radiographic evidence of acute cardiopulmonary disease. CT Results  (Last 48 hours)    None        CXR Results  (Last 48 hours)               09/13/19 2210  XR CHEST PORT Final result    Impression:  IMPRESSION: Limited exam   1. No radiographic evidence of acute cardiopulmonary disease. Narrative:  INDICATION: . Chest Pain   Additional history:   COMPARISON: Previous chest xray, 8/29/2019. LIMITATIONS: Portable technique. Patient body habitus markedly limits   evaluation/underpenetration. Megan Bradley FINDINGS: Single frontal view of the chest.    .   Lines/tubes/surgical: Cardiac monitor leads overly the patient. Heart/mediastinum: Unremarkable. Lungs/pleura: No visible focal consolidation with lung bases obscured by soft   tissue. No visualized pleural effusion or pneumothorax. Additional Comments: None. .               Medical Decision Making   I am the first provider for this patient. I reviewed the vital signs, available nursing notes, past medical history, past surgical history, family history and social history. Vital Signs-Reviewed the patient's vital signs. Patient Vitals for the past 12 hrs:   Temp Pulse Resp BP SpO2   09/14/19 0202 97.8 °F (36.6 °C) 84 16 132/67 (!) 89 %   09/13/19 2233     98 %   09/13/19 2050 97.8 °F (36.6 °C) 79 22 124/77 (!) 80 %       Records Reviewed: Nursing Notes and Old Medical Records    Provider Notes (Medical Decision Making):   Patient presenting with shortness of breath over the past few days as well as midsternal in epic pain radiating to the left and the right. Regarding the pain, most likely esophagitis versus gastritis versus GERD. Less likely ACS, CHF, pancreatitis, cholecystitis, hepatitis. Patient was seen here 2 weeks ago and had a full chest pain work-up which was negative. Regarding her shortness of breath most likely COPD exacerbation.  Will get abg given her hx and lack of cpap use    ED Course:   Initial assessment performed. The patients presenting problems have been discussed, and they are in agreement with the care plan formulated and outlined with them. I have encouraged them to ask questions as they arise throughout their visit. ED Course as of Sep 14 0302   Fri Sep 13, 2019   2151 EKG interpreted by me. Shows normal sinus rhythm with no ST elevations or depressions concerning for ischemia. Heart rate 72. [JS]   4346 ABG shows a PCO2 of 75. Given this we will put her on BiPAP and then reassess. She continues to speak in full sentences and saturate well on her 5 L nasal cannula. [JS]      ED Course User Index  [JS] Sylvia Marlow MD     Critical Care Time:   CRITICAL CARE NOTE :    3:02 AM    IMPENDING DETERIORATION -Airway and Respiratory  ASSOCIATED RISK FACTORS - Hypoxia  MANAGEMENT- Bedside Assessment and Supervision of Care  INTERPRETATION -  Xrays, ECG, Blood Pressure and Cardiac Output Measures   INTERVENTIONS - vent mngmt  CASE REVIEW - Hospitalist, Nursing and Family  TREATMENT RESPONSE -Stable  PERFORMED BY - Self    NOTES   :    I have spent 35 minutes of critical care time involved in lab review, consultations with specialist, family decision- making, bedside attention and documentation. During this entire length of time I was immediately available to the patient . Disposition:    Admission Note:  Patient is being admitted to the hospital by [facility], Service: Hospitalist.  The results of their tests and reasons for their admission have been discussed with them and available family. They convey agreement and understanding for the need to be admitted and for their admission diagnosis. PLAN:  Current Discharge Medication List        2. Follow-up Information    None       3. Return to ED if worse     Diagnosis     Clinical Impression:   1.  Acute on chronic respiratory failure with hypercapnia (HCC) Attestations:    Hermelindo Nielsen M.D. Please note that this dictation was completed with ZarthCode, the computer voice recognition software. Quite often unanticipated grammatical, syntax, homophones, and other interpretive errors are inadvertently transcribed by the computer software. Please disregard these errors. Please excuse any errors that have escaped final proofreading. Thank you.

## 2019-09-14 NOTE — PROGRESS NOTES
Admitted early this am  Does not use Trilogy as AC is broke ? Add ICS change nebs to scheduled  Compliance discussed  Now off BIPAP not bronchospastic  Add Zithromax.  Stop fluids restart home medications

## 2019-09-14 NOTE — PROGRESS NOTES
0700: Received bedside report from Olimpia Hutson, off going nurse. Assumed care of patient. 0900: Pt off bipap on 5L, O2 91%Dr. Doreen Novak aware, will order diabetic diet. 1900: Bedside shift change report GIVEN TO Preethi Larose RN. Report included the following information SBAR, Kardex, Intake/Output, MAR, Recent Results and Cardiac Rhythm NSR.         SIGNIFICANT CHANGES DURING SHIFT:  Tolerating 5L NC, off bipap since this morning. Ordered nystatin powder for yeast/excoriation under breasts and groin. CONCERNS TO ADDRESS WITH MD:            [facility] NURSING NOTE   Admission Date 9/13/2019   Admission Diagnosis COPD exacerbation ([address] [J44.1]   Consults IP CONSULT TO PULMONOLOGY      Cardiac Monitoring [x] Yes [] No      Purposeful Hourly Rounding [x] Yes    Yu Score Total Score: 2   Yu score 3 or > [] Bed Alarm [] Avasys [] 1:1 sitter [] Patient refused (Signed refusal form in chart)   Jeffrey Score Jeffrey Score: 18   Jeffrey score 14 or < [] PMT consult [] Wound Care consult    []  Specialty bed  [] Nutrition consult      Influenza Vaccine Received Flu Vaccine for Current Season (usually Sept-March): Not Flu Season           Oxygen needs? [] Room air Oxygen @  []1L    []2L    []3L   []4L    [x]5L   []6L via  NC   Chronic home O2 use?  [x] Yes [] No  Perform O2 challenge test and document in progress note using smartphrase (.Homeoxygen)      Last bowel movement Last Bowel Movement Date: 09/14/19      Urinary Catheter             LDAs               Peripheral IV 09/13/19 Right Antecubital (Active)   Site Assessment Clean, dry, & intact 9/14/2019  3:53 PM   Phlebitis Assessment 0 9/14/2019  3:53 PM   Infiltration Assessment 0 9/14/2019  3:53 PM   Dressing Status Clean, dry, & intact 9/14/2019  3:53 PM   Dressing Type Transparent 9/14/2019  3:53 PM   Hub Color/Line Status Green;Flushed;Patent 9/14/2019  3:53 PM                         Readmission Risk Assessment Tool Score High Risk            30       Total Score        3 Has Seen PCP in Last 6 Months (Yes=3, No=0)    11 IP Visits Last 12 Months (1-3=4, 4=9, >4=11)    9 Pt. Coverage (Medicare=5 , Medicaid, or Self-Pay=4)    7 Charlson Comorbidity Score (Age + Comorbid Conditions)        Criteria that do not apply:    . Living with Significant Other. Assisted Living. LTAC. SNF.  or   Rehab    Patient Length of Stay (>5 days = 3)       Expected Length of Stay - - -   Actual Length of Stay 0

## 2019-09-14 NOTE — PROGRESS NOTES
Problem: Mobility Impaired (Adult and Pediatric)  Goal: *Therapy Goal (Edit Goal, Insert Text)  Description  1. Patient's ambulatory endurance will improve to be able to walk 30 ft without desaturating with sats in 90s within 7 days. 2. Patient will learn, and understand and be able to perform pursed lip/lateral lung expansion breathing within 7 days. 3. Care team including PT will help pt with DC needs or rehab needs within 7 days. Outcome: Progressing Towards Goal   PHYSICAL THERAPY EVALUATION  Patient: Salome Green (89 y.o. female)  Date: 9/14/2019  Primary Diagnosis: COPD exacerbation ([facility] [J44.1]        Precautions: Desats with activity, fall         ASSESSMENT  Based on the objective data described below, the patient presents with chronic lymphedema, severe COPD which is o2 dependent, very poor endurance, obesity, and poor mobility, though pt is independent in bed mobility and transfers and ambulation. She does not use any AD at home, though ambulates very little distance. She has an aide that helps her with her bath. Pt lives alone, but nephews do check on her. Pt could benefit from PT to help with increasing her endurance, energy conservation and teaching pursed lip breathing and lateral lung expansion. When this was done today with pt, she acted as if this was all new information to her (which seems hard to imagine given her severe COPD)  Pt is set in her ways but was open to these exercises given the fact that she is breathing with only upper third of her lungs. Spoke to RN to get pt an incentive spirometer. Current Level of Function Impacting Discharge (mobility/balance): endurance, isolation    Functional Outcome Measure: The patient scored 14 on the MEMS outcome measure which is indicative of borderline I. Other factors to consider for discharge: on 5l o2 and desats with activity     Patient will benefit from skilled therapy intervention to address the above noted impairments. PLAN :  Recommendations and Planned Interventions: therapeutic exercises and therapeutic activities      Frequency/Duration: Patient will be followed by physical therapy:  3 times a week to address goals. Recommendation for discharge: (in order for the patient to meet his/her long term goals)  Physical therapy at least 2 days/week in the home     This discharge recommendation:  Has not yet been discussed the attending provider and/or case management    Equipment recommendations for successful discharge (if) home: patient owns DME required for discharge         SUBJECTIVE:   Patient stated You shouldn't have made me walk so far. Janett Burgess    OBJECTIVE DATA SUMMARY:   HISTORY:    Past Medical History:   Diagnosis Date    Arthritis     Asthma     Breast lump     CAD (coronary artery disease)     Congestive heart failure (HCC)     COPD (chronic obstructive pulmonary disease) ([facility])     Diabetes ([facility]     Hypertension     Morbid obesity with BMI of 70 and over, adult [facility])     NSTEMI (non-ST elevated myocardial infarction) ([facility] [facility]     SVT (supraventricular tachycardia) ([facility] [facility]      Past Surgical History:   Procedure Laterality Date    CARDIAC SURG PROCEDURE UNLIST      Stents    HX  SECTION      HX ORTHOPAEDIC      HX OTHER SURGICAL      cyst removed from back       Personal factors and/or comorbidities impacting plan of care: obesity, lymphedema    Home Situation  Home Environment: Patient room  One/Two Story Residence: One story  Living Alone: No  Support Systems: Child(kole)  Patient Expects to be Discharged to[de-identified] Private residence  Current DME Used/Available at Home: None    EXAMINATION/PRESENTATION/DECISION MAKING:   Critical Behavior:              Hearing:   Auditory  Auditory Impairment: None  Skin:  hard, scales due to edema  Edema: +3 all extremities  Range Of Motion:   WFL except limited by flesh for full ROM                       Strength:        Generally decreased but within functional limits Tone & Sensation:    Normal,                               Coordination:     Vision:      Functional Mobility:  Bed Mobility:  Rolling: Independent  Supine to Sit: Independent  Sit to Supine: Independent     Transfers:  Sit to Stand: Independent  Stand to Sit: Independent                       Balance:   Sitting: Intact  Standing: Intact  Ambulation/Gait Training:  Distance (ft): 12 Feet (ft)     Ambulation - Level of Assistance: Independent        Gait Abnormalities: Decreased step clearance  Right Side Weight Bearing: Full  Left Side Weight Bearing: Full  Base of Support: Widened     Speed/Danay: Pace decreased (<100 feet/min)  Step Length: Right shortened;Left shortened                     Stairs: Therapeutic Exercises:   Pursed lip breathing instruction  Lateral lung expansion with pt placing hands on lower ribs to feel expansion  Need to do pulmonary ex and use spirometer    Functional Measure:  [facility]    Elder Mobility Scale    14/20         Scores under 10 - generally these patients are dependent in mobility maneuvers; require help with  basic ADL, such as transfers, toileting and dressing. Scores between 10 - 13 - generally these patients are borderline in terms of safe mobility and  independence in ADL i.e. they require some help with some mobility maneuvers. Scores over 14 - Generally these patients are able to perform mobility maneuvers alone and safely  and are independent in basic ADL.              Physical Therapy Evaluation Charge Determination   History Examination Presentation Decision-Making   LOW Complexity : Zero comorbidities / personal factors that will impact the outcome / POC LOW Complexity : 1-2 Standardized tests and measures addressing body structure, function, activity limitation and / or participation in recreation  LOW Complexity : Stable, uncomplicated  LOW Complexity : FOTO score of       Based on the above components, the patient evaluation is determined to be of the following complexity level: LOW     Pain Rating:      Activity Tolerance:   desaturates with exertion and requires oxygen, requires frequent rest breaks and observed SOB with activity  Please refer to the flowsheet for vital signs taken during this treatment. After treatment patient left in no apparent distress:   Supine in bed, refused to sit in chair as she had been up all day in ER    COMMUNICATION/EDUCATION:   The patients plan of care was discussed with: Registered Nurse. Patient/family agree to work toward stated goals and plan of care.     Thank you for this referral.  Marshall Seay, PT   Time Calculation: 33 mins

## 2019-09-14 NOTE — ED NOTES
Patient's oxygen dropped to 88%. Upon checking on the patient, she has discontinued her bi-pap to eat dinner. The patient placed on her nasal cannula and stated she will return to bi-pap after dinner.

## 2019-09-14 NOTE — PROGRESS NOTES
Pt has yeast in groin breast, and pannus. Escoration of all folds. Bilateral pressure or venous wounds of feet.

## 2019-09-14 NOTE — CONSULTS
Initial Pulmonary / Critical Care Consultation    Assessment / Plan:    Acute and chronic respiratory failure with hypercapnia - worsening hypercarbia due to non-compliance with home trilogy machine    Obesity - with GEN / OHS    DM    Diastolic dysfunction    --continue bipap qhs /prn  --diuresis  --continue inhaled meds    Will be available to see on 9/15 if needed    History / Subjective:  Reason:  Acute and chronic respiratory failure  Requesting Provider:  Dr Joan Kilgore is a 54 y.o.  female who  has a past medical history of Arthritis, Asthma, Breast lump, CAD (coronary artery disease), Congestive heart failure ([address] COPD (chronic obstructive pulmonary disease) ([address] Diabetes ([address] Hypertension, Morbid obesity with BMI of 70 and over, adult [facility]), NSTEMI (non-ST elevated myocardial infarction) [address] and SVT (supraventricular tachycardia) ([facility]  admitted 2019 with shortness of breath    Pt has GEN / OHS and is supposed to be on nocturnal trilogy  Her AC broke and due to heat she did not use her trilogy for several nights and became more short of breath    PCO2 were in mid 70's up from baseline    She has been placed on bipap qhs  Has also been placed on nebs, empiric abx with azithromycin and diuretics    Feeling a bit better today    No Known Allergies  Past Medical History:   Diagnosis Date    Arthritis     Asthma     Breast lump     CAD (coronary artery disease)     Congestive heart failure ([address]     COPD (chronic obstructive pulmonary disease) ([address]     Diabetes ([address]     Hypertension     Morbid obesity with BMI of 70 and over, adult ([address]     NSTEMI (non-ST elevated myocardial infarction) ([address]     SVT (supraventricular tachycardia) ([address]       Past Surgical History:   Procedure Laterality Date    CARDIAC SURG PROCEDURE UNLIST      Stents    HX  SECTION      HX ORTHOPAEDIC      HX OTHER SURGICAL      cyst removed from back      Prior to Admission medications Medication Sig Start Date End Date Taking? Authorizing Provider   alum-mag hydroxide-simeth (MAALOX ADVANCED) 200-200-20 mg/5 mL susp Take 30 mL by mouth every four (4) hours as needed for Pain. 6/25/19   Arlin Sales PA   ranolazine ER (RANEXA) 500 mg SR tablet Take 1 Tab by mouth two (2) times a day. 4/8/19   Dave Boss NP   cyclobenzaprine (FLEXERIL) 5 mg tablet Take 1 Tab by mouth two (2) times daily as needed for Muscle Spasm(s). flexeriol 3/17/19   Astrid Almazan NP   albuterol-ipratropium (DUO-NEB) 2.5 mg-0.5 mg/3 ml nebu 3 mL by Nebulization route four (4) times daily. 3/3/19   Hi Villagran MD   furosemide (LASIX) 40 mg tablet Take 40 mg by mouth two (2) times a day. OtherLandon MD   nystatin (MYCOSTATIN) topical cream Apply  to affected area two (2) times a day. Landon Raza MD   nystatin (MYCOSTATIN) powder Apply  to affected area two (2) times a day. Landon Raza MD   fluticasone (FLONASE) 50 mcg/actuation nasal spray 2 Sprays by Both Nostrils route daily. 1/6/19   Mary Lopez MD   insulin glargine (LANTUS) 100 unit/mL injection 45 Units by SubCUTAneous route daily. Provider, Historical   metoprolol tartrate (LOPRESSOR) 25 mg tablet Take 25 mg by mouth two (2) times a day. Provider, Historical   fluticasone-salmeterol (ADVAIR DISKUS) 500-50 mcg/dose diskus inhaler Take 1 Puff by inhalation every twelve (12) hours. Provider, Historical   loratadine (CLARITIN) 10 mg tablet Take 10 mg by mouth daily. Provider, Historical   albuterol (VENTOLIN HFA) 90 mcg/actuation inhaler Take 2 Puffs by inhalation every six (6) hours as needed for Wheezing. Provider, Historical   aspirin 81 mg chewable tablet Take 81 mg by mouth daily. Provider, Historical   hydrOXYzine pamoate (VISTARIL) 50 mg capsule Take 50 mg by mouth every eight (8) hours as needed for Itching. Provider, Historical   lovastatin (MEVACOR) 40 mg tablet Take 1 Tab by mouth nightly.  1/14/16 Richelle Nath NP   glyBURIDE (DIABETA) 5 mg tablet Take 5 mg by mouth two (2) times daily (with meals). Provider, Historical   ammonium lactate (LAC-HYDRIN) 12 % topical cream rub in to affected area well 14   Horacio Song MD   nitroglycerin (NITROSTAT) 0.4 mg SL tablet 1 Tab by SubLINGual route every five (5) minutes as needed for Chest Pain (call 911 if not relieved by 3). 13   Richelle Nath NP      Family History   Problem Relation Age of Onset    Heart Disease Mother     Heart Disease Father     Heart Disease Sister     Breast Cancer Maternal Grandmother     Breast Cancer Paternal Grandmother      Social History     Tobacco Use    Smoking status: Former Smoker     Packs/day: 0.25     Types: Cigarettes    Smokeless tobacco: Never Used    Tobacco comment: Patient states \"I  aint smoking no more d*mn cigarettes after yesterday\" 2018   Substance Use Topics    Alcohol use: No      ROS:  A comprehensive review of systems was negative except for that written in the HPI. Objective:  Patient Vitals for the past 4 hrs:   BP Temp Pulse Resp SpO2   19 0801     91 %   19 0800 127/84 97.5 °F (36.4 °C) 81 20 95 %     Temp (24hrs), Av.7 °F (36.5 °C), Min:97.5 °F (36.4 °C), Max:97.9 °F (36.6 °C)    CVP:        No intake or output data in the 24 hours ending 19 1009  Blood Sugar:  Glucose   Date Value Ref Range Status   2019 250 (H) 65 - 100 mg/dL Final   2019 111 (H) 65 - 100 mg/dL Final   2019 172 (H) 65 - 100 mg/dL Final   2019 62 (L) 65 - 100 mg/dL Final   2019 119 (H) 65 - 100 mg/dL Final     Exam:  Obese  Alert  No distress  Diminished bs at bases  RRR  Warm and dry    Lab data was reviewed. Radiology images were independently viewed and available reports were reviewed.     CXR - no acute process    Lab:  Recent Labs     19  0641 19  2228   WBC 9.2 8.8   HGB 11.9 11.9   HCT 40.8 39.8   * 155     Recent Labs 09/14/19 0641 09/13/19 2228    139   K 4.4 4.2    102   CO2 32 36*   * 111*   BUN 19 18   CREA 0.86 0.72   CA 10.1 9.7       Recent Labs     09/13/19 2228   TROIQ <0.05       ABG:  Recent Labs     09/14/19 0640 09/13/19 2205   PHI 7.320* 7.318*   PCO2I 63.3* 75.7*   PO2I 86 94   HCO3I 32.6* 38.8*   FIO2I 40  --        Oxygen Therapy:  Oxygen Therapy  O2 Sat (%): 91 % (09/14/19 0801)  Pulse via Oximetry: 72 beats per minute (09/14/19 0801)  O2 Device: Nasal cannula (09/14/19 0801)  O2 Flow Rate (L/min): 5 l/min (09/14/19 0801)  FIO2 (%): 40 % (09/14/19 0330)  ETCO2 (mmHg): 5 mmHg (09/13/19 2050)    Ventilator:  Ventilator Volumes  Vt Spont (ml): 671 ml (09/14/19 0330)  Ve Observed (l/min): 15 l/min (09/14/19 0330)    Microbiology:  Lab Results   Component Value Date/Time    Specimen Description: URINE 02/18/2014 06:10 AM    Specimen Description: PBC 01/12/2013 10:00 PM    Specimen Description: NARES 08/18/2012 04:40 PM     Lab Results   Component Value Date/Time    Culture result: NO GROWTH 5 DAYS 04/04/2019 09:30 PM    Culture result: MODERATE NORMAL RESPIRATORY AMRIK 09/25/2018 12:25 PM    Culture result: MODERATE STAPHYLOCOCCUS AUREUS (A) 08/19/2018 07:20 AM    Culture result: FEW ENTEROCOCCUS FAECALIS GROUP D (A) 08/19/2018 07:20 AM    Culture result: MODERATE DIPHTHEROIDS . ..(2 COLONY TYPES) (A) 08/19/2018 07:20 AM         Joshua Acosta MD

## 2019-09-14 NOTE — H&P
Hospitalist Admission Note    NAME: Bhavna Perkins   :  1970   MRN:  [id number]     Date/Time:  2019 5:12 AM    Patient PCP: Harriet Lion MD  ______________________________________________________________________  Given the patient's current clinical presentation, I have a high level of concern for decompensation if discharged from the emergency department. Complex decision making was performed, which includes reviewing the patient's available past medical records, laboratory results, and x-ray films. My assessment of this patient's clinical condition and my plan of care is as follows. Assessment / Plan:    Acute on chronic hypercapnic and hypoxic respiratory failure  Admit this stepdown unit  Continue BiPAP  DuoNeb nebulizer  -pulmonary consult       Chronic diastolic CHF  Hold Lasix patient n.p.o.  Reassess  volume status at a.m. Chest pain   -f/u serial troponin   -echocardiogram     DM  Continue Lantus  Start patient sliding scale    Morbid obesity    COPD  -continue nebulizer     Chronic lymphedema    HTN   -continue home med       Code Status: Full   Surrogate Decision Maker:Edwar Ro    DVT Prophylaxis: Lovenox   GI Prophylaxis: not indicated          Subjective:   CHIEF COMPLAINT: SOB     HISTORY OF PRESENT ILLNESS:     54years old female from home with past medical history significant for COPD, morbid obesity, DM, obstructive sleep apnea presented to the hospital for evaluation of worsening shortness of breath associated with chest pain, patient stated her AC was not working and for this reason she not  used her CPAP  because it was very hot, patient denies any fever chills any nausea vomiting any abdominal pain, ABG was done and show elevated CO2 level 75.7. We were asked to admit for work up and evaluation of the above problems.      Past Medical History:   Diagnosis Date    Arthritis     Asthma     Breast lump     CAD (coronary artery disease)     Congestive heart failure (HCC)     [facility] ([facility]) [facility]     Diabetes ([facility]     Hypertension     Morbid obesity with BMI of 70 and over, adult ([facility]     NSTEMI (non-ST elevated myocardial infarction) ([facility]     SVT (supraventricular tachycardia) ([facility]         Past Surgical History:   Procedure Laterality Date    CARDIAC SURG PROCEDURE UNLIST      Stents    HX  SECTION      HX ORTHOPAEDIC      HX OTHER SURGICAL      cyst removed from back       Social History     Tobacco Use    Smoking status: Former Smoker     Packs/day: 0.25     Types: Cigarettes    Smokeless tobacco: Never Used    Tobacco comment: Patient states \"I  aint smoking no more d*mn cigarettes after yesterday\" 2018   Substance Use Topics    Alcohol use: No        Family History   Problem Relation Age of Onset    Heart Disease Mother     Heart Disease Father     Heart Disease Sister     Breast Cancer Maternal Grandmother     Breast Cancer Paternal Grandmother      No Known Allergies     Prior to Admission medications    Medication Sig Start Date End Date Taking? Authorizing Provider   alum-mag hydroxide-simeth (MAALOX ADVANCED) 200-200-20 mg/5 mL susp Take 30 mL by mouth every four (4) hours as needed for Pain. 19   Flash Sales PA   ranolazine ER (RANEXA) 500 mg SR tablet Take 1 Tab by mouth two (2) times a day. 19   Izzy Pereira NP   cyclobenzaprine (FLEXERIL) 5 mg tablet Take 1 Tab by mouth two (2) times daily as needed for Muscle Spasm(s). flexeriol 3/17/19   Astrid Almazan NP   albuterol-ipratropium (DUO-NEB) 2.5 mg-0.5 mg/3 ml nebu 3 mL by Nebulization route four (4) times daily. 3/3/19   Zuleyma Lee MD   furosemide (LASIX) 40 mg tablet Take 40 mg by mouth two (2) times a day. Landon Raza MD   nystatin (MYCOSTATIN) topical cream Apply  to affected area two (2) times a day.     Landon Raza MD   nystatin (MYCOSTATIN) powder Apply  to affected area two (2) times a day. Other, MD Landon   fluticasone (FLONASE) 50 mcg/actuation nasal spray 2 Sprays by Both Nostrils route daily. 1/6/19   Hue Nath MD   insulin glargine (LANTUS) 100 unit/mL injection 45 Units by SubCUTAneous route daily. Provider, Historical   metoprolol tartrate (LOPRESSOR) 25 mg tablet Take 25 mg by mouth two (2) times a day. Provider, Historical   fluticasone-salmeterol (ADVAIR DISKUS) 500-50 mcg/dose diskus inhaler Take 1 Puff by inhalation every twelve (12) hours. Provider, Historical   loratadine (CLARITIN) 10 mg tablet Take 10 mg by mouth daily. Provider, Historical   albuterol (VENTOLIN HFA) 90 mcg/actuation inhaler Take 2 Puffs by inhalation every six (6) hours as needed for Wheezing. Provider, Historical   aspirin 81 mg chewable tablet Take 81 mg by mouth daily. Provider, Historical   hydrOXYzine pamoate (VISTARIL) 50 mg capsule Take 50 mg by mouth every eight (8) hours as needed for Itching. Provider, Historical   lovastatin (MEVACOR) 40 mg tablet Take 1 Tab by mouth nightly. 1/14/16   Javier Kat NP   glyBURIDE (DIABETA) 5 mg tablet Take 5 mg by mouth two (2) times daily (with meals). Provider, Historical   ammonium lactate (LAC-HYDRIN) 12 % topical cream rub in to affected area well 11/21/14   Christa Song MD   nitroglycerin (NITROSTAT) 0.4 mg SL tablet 1 Tab by SubLINGual route every five (5) minutes as needed for Chest Pain (call 911 if not relieved by 3). 9/12/13   Javier Kat NP       REVIEW OF SYSTEMS:     I am not able to complete the review of systems because:    The patient is intubated and sedated    The patient has altered mental status due to his acute medical problems    The patient has baseline aphasia from prior stroke(s)    The patient has baseline dementia and is not reliable historian    The patient is in acute medical distress and unable to provide information           Total of 12 systems reviewed as follows:       POSITIVE= underlined text  Negative = text not underlined  General:  fever, chills, sweats, generalized weakness, weight loss/gain,      loss of appetite   Eyes:    blurred vision, eye pain, loss of vision, double vision  ENT:    rhinorrhea, pharyngitis   Respiratory:   cough, sputum production, SOB, RAZO, wheezing, pleuritic pain   Cardiology:   chest pain, palpitations, orthopnea, PND, edema, syncope   Gastrointestinal:  abdominal pain , N/V, diarrhea, dysphagia, constipation, bleeding   Genitourinary:  frequency, urgency, dysuria, hematuria, incontinence   Muskuloskeletal :  arthralgia, myalgia, back pain  Hematology:  easy bruising, nose or gum bleeding, lymphadenopathy   Dermatological: rash, ulceration, pruritis, color change / jaundice  Endocrine:   hot flashes or polydipsia   Neurological:  headache, dizziness, confusion, focal weakness, paresthesia,     Speech difficulties, memory loss, gait difficulty  Psychological: Feelings of anxiety, depression, agitation    Objective:   VITALS:    Visit Vitals  BP (!) 170/139   Pulse 84   Temp 97.9 °F (36.6 °C)   Resp 22   Ht 5' 6\" (1.676 m)   Wt (!) 184.6 kg (406 lb 15.5 oz)   SpO2 93%   BMI 65.69 kg/m²       PHYSICAL EXAM:    General:    Alert, cooperative, no distress, appears stated age. HEENT: Atraumatic, anicteric sclerae, pink conjunctivae     No oral ulcers, mucosa moist, throat clear, dentition fair  Neck:  Supple, symmetrical,  thyroid: non tender  Lungs:   diminished breath sound b/l   Chest wall:  No tenderness  No Accessory muscle use. Heart:   Regular  rhythm,  No  murmur  B/l  edema  Abdomen:   Soft, non-tender. Not distended. Bowel sounds normal  Extremities: No cyanosis. No clubbing,      Skin turgor normal, Capillary refill normal, Radial dial pulse 2+  Skin:     Not pale. Not Jaundiced  No rashes   Psych:  Good insight. Not depressed. Not anxious or agitated. Neurologic: EOMs intact. No facial asymmetry.  No aphasia or slurred speech. Symmetrical strength, Sensation grossly intact. Alert and oriented X 4.     _______________________________________________________________________  Care Plan discussed with:    Comments   Patient y    Family      RN y    Care Manager                    Consultant:      _______________________________________________________________________  Expected  Disposition:   Home with Family y   HH/PT/OT/RN    SNF/LTC    LUIS A    ________________________________________________________________________  TOTAL TIME:  72  Minutes    Critical Care Provided     Minutes non procedure based      Comments    y Reviewed previous records   >50% of visit spent in counseling and coordination of care y Discussion with patient and/or family and questions answered       ________________________________________________________________________  Signed: Yvon Frazier MD    Procedures: see electronic medical records for all procedures/Xrays and details which were not copied into this note but were reviewed prior to creation of Plan.     LAB DATA REVIEWED:    Recent Results (from the past 24 hour(s))   EKG, 12 LEAD, INITIAL    Collection Time: 09/13/19  9:14 PM   Result Value Ref Range    Ventricular Rate 72 BPM    Atrial Rate 72 BPM    P-R Interval 192 ms    QRS Duration 92 ms    Q-T Interval 388 ms    QTC Calculation (Bezet) 424 ms    Calculated P Axis 37 degrees    Calculated R Axis 60 degrees    Calculated T Axis 36 degrees    Diagnosis       Normal sinus rhythm  Low voltage QRS  When compared with ECG of 29-AUG-2019 22:21,  No significant change was found     POC G3 - PUL    Collection Time: 09/13/19 10:05 PM   Result Value Ref Range    pH (POC) 7.318 (L) 7.35 - 7.45      pCO2 (POC) 75.7 (H) 35.0 - 45.0 MMHG    pO2 (POC) 94 80 - 100 MMHG    HCO3 (POC) 38.8 (H) 22 - 26 MMOL/L    sO2 (POC) 96 92 - 97 %    Base excess (POC) 13 mmol/L    Site RIGHT RADIAL      Device: NASAL CANNULA      Flow rate (POC) 5 L/M    Allens test (POC) YES      Specimen type (POC) ARTERIAL      Total resp.  rate 23     CBC W/O DIFF    Collection Time: 09/13/19 10:28 PM   Result Value Ref Range    WBC 8.8 3.6 - 11.0 K/uL    RBC 4.24 3.80 - 5.20 M/uL    HGB 11.9 11.5 - 16.0 g/dL    HCT 39.8 35.0 - 47.0 %    MCV 93.9 80.0 - 99.0 FL    MCH 28.1 26.0 - 34.0 PG    MCHC 29.9 (L) 30.0 - 36.5 g/dL    RDW 13.3 11.5 - 14.5 %    PLATELET 586 346 - 364 K/uL    MPV 11.6 8.9 - 12.9 FL    NRBC 0.0 0  WBC    ABSOLUTE NRBC 0.00 0.00 - 1.59 K/uL   METABOLIC PANEL, BASIC    Collection Time: 09/13/19 10:28 PM   Result Value Ref Range    Sodium 139 136 - 145 mmol/L    Potassium 4.2 3.5 - 5.1 mmol/L    Chloride 102 97 - 108 mmol/L    CO2 36 (H) 21 - 32 mmol/L    Anion gap 1 (L) 5 - 15 mmol/L    Glucose 111 (H) 65 - 100 mg/dL    BUN 18 6 - 20 MG/DL    Creatinine 0.72 0.55 - 1.02 MG/DL    BUN/Creatinine ratio 25 (H) 12 - 20      GFR est AA >60 >60 ml/min/1.73m2    GFR est non-AA >60 >60 ml/min/1.73m2    Calcium 9.7 8.5 - 10.1 MG/DL   TROPONIN I    Collection Time: 09/13/19 10:28 PM   Result Value Ref Range    Troponin-I, Qt. <0.05 <0.05 ng/mL

## 2019-09-15 LAB
ATRIAL RATE: 72 BPM
CALCULATED P AXIS, ECG09: 37 DEGREES
CALCULATED R AXIS, ECG10: 60 DEGREES
CALCULATED T AXIS, ECG11: 36 DEGREES
DIAGNOSIS, 93000: NORMAL
GLUCOSE BLD STRIP.AUTO-MCNC: 107 MG/DL (ref 65–100)
GLUCOSE BLD STRIP.AUTO-MCNC: 131 MG/DL (ref 65–100)
GLUCOSE BLD STRIP.AUTO-MCNC: 149 MG/DL (ref 65–100)
GLUCOSE BLD STRIP.AUTO-MCNC: 149 MG/DL (ref 65–100)
P-R INTERVAL, ECG05: 192 MS
Q-T INTERVAL, ECG07: 388 MS
QRS DURATION, ECG06: 92 MS
QTC CALCULATION (BEZET), ECG08: 424 MS
SERVICE CMNT-IMP: ABNORMAL
VENTRICULAR RATE, ECG03: 72 BPM

## 2019-09-15 PROCEDURE — 94660 CPAP INITIATION&MGMT: CPT

## 2019-09-15 PROCEDURE — 74011000250 HC RX REV CODE- 250: Performed by: INTERNAL MEDICINE

## 2019-09-15 PROCEDURE — 82962 GLUCOSE BLOOD TEST: CPT

## 2019-09-15 PROCEDURE — 65660000001 HC RM ICU INTERMED STEPDOWN

## 2019-09-15 PROCEDURE — 94640 AIRWAY INHALATION TREATMENT: CPT

## 2019-09-15 PROCEDURE — 74011636637 HC RX REV CODE- 636/637: Performed by: HOSPITALIST

## 2019-09-15 PROCEDURE — 74011250637 HC RX REV CODE- 250/637: Performed by: INTERNAL MEDICINE

## 2019-09-15 PROCEDURE — 74011636637 HC RX REV CODE- 636/637: Performed by: INTERNAL MEDICINE

## 2019-09-15 PROCEDURE — 94760 N-INVAS EAR/PLS OXIMETRY 1: CPT

## 2019-09-15 PROCEDURE — 74011250637 HC RX REV CODE- 250/637: Performed by: HOSPITALIST

## 2019-09-15 PROCEDURE — 77010033678 HC OXYGEN DAILY

## 2019-09-15 PROCEDURE — 74011250636 HC RX REV CODE- 250/636: Performed by: INTERNAL MEDICINE

## 2019-09-15 RX ORDER — AMOXICILLIN AND CLAVULANATE POTASSIUM 875; 125 MG/1; MG/1
1 TABLET, FILM COATED ORAL 2 TIMES DAILY WITH MEALS
Status: DISCONTINUED | OUTPATIENT
Start: 2019-09-15 | End: 2019-09-16 | Stop reason: HOSPADM

## 2019-09-15 RX ADMIN — IPRATROPIUM BROMIDE AND ALBUTEROL SULFATE 3 ML: .5; 3 SOLUTION RESPIRATORY (INHALATION) at 14:39

## 2019-09-15 RX ADMIN — LORATADINE 10 MG: 10 TABLET ORAL at 09:14

## 2019-09-15 RX ADMIN — METOPROLOL TARTRATE 25 MG: 25 TABLET ORAL at 17:11

## 2019-09-15 RX ADMIN — IPRATROPIUM BROMIDE AND ALBUTEROL SULFATE 3 ML: .5; 3 SOLUTION RESPIRATORY (INHALATION) at 23:33

## 2019-09-15 RX ADMIN — RANOLAZINE 500 MG: 500 TABLET, FILM COATED, EXTENDED RELEASE ORAL at 09:14

## 2019-09-15 RX ADMIN — FUROSEMIDE 40 MG: 40 TABLET ORAL at 17:11

## 2019-09-15 RX ADMIN — METOPROLOL TARTRATE 25 MG: 25 TABLET ORAL at 09:14

## 2019-09-15 RX ADMIN — IPRATROPIUM BROMIDE AND ALBUTEROL SULFATE 3 ML: .5; 3 SOLUTION RESPIRATORY (INHALATION) at 02:44

## 2019-09-15 RX ADMIN — FUROSEMIDE 40 MG: 40 TABLET ORAL at 09:14

## 2019-09-15 RX ADMIN — GLYBURIDE 5 MG: 5 TABLET ORAL at 17:11

## 2019-09-15 RX ADMIN — INSULIN LISPRO 2 UNITS: 100 INJECTION, SOLUTION INTRAVENOUS; SUBCUTANEOUS at 17:11

## 2019-09-15 RX ADMIN — RANOLAZINE 500 MG: 500 TABLET, FILM COATED, EXTENDED RELEASE ORAL at 17:11

## 2019-09-15 RX ADMIN — Medication 10 ML: at 06:35

## 2019-09-15 RX ADMIN — Medication 10 ML: at 14:04

## 2019-09-15 RX ADMIN — ASPIRIN 81 MG 81 MG: 81 TABLET ORAL at 09:14

## 2019-09-15 RX ADMIN — ATORVASTATIN CALCIUM 20 MG: 20 TABLET, FILM COATED ORAL at 21:06

## 2019-09-15 RX ADMIN — NYSTATIN: 100000 POWDER TOPICAL at 09:17

## 2019-09-15 RX ADMIN — BUDESONIDE 500 MCG: 0.5 INHALANT RESPIRATORY (INHALATION) at 23:33

## 2019-09-15 RX ADMIN — IPRATROPIUM BROMIDE AND ALBUTEROL SULFATE 3 ML: .5; 3 SOLUTION RESPIRATORY (INHALATION) at 11:31

## 2019-09-15 RX ADMIN — GLYBURIDE 5 MG: 5 TABLET ORAL at 09:14

## 2019-09-15 RX ADMIN — INSULIN GLARGINE 45 UNITS: 100 INJECTION, SOLUTION SUBCUTANEOUS at 09:14

## 2019-09-15 RX ADMIN — CYCLOBENZAPRINE HYDROCHLORIDE 5 MG: 10 TABLET, FILM COATED ORAL at 14:04

## 2019-09-15 RX ADMIN — ENOXAPARIN SODIUM 40 MG: 40 INJECTION SUBCUTANEOUS at 17:11

## 2019-09-15 RX ADMIN — ENOXAPARIN SODIUM 40 MG: 40 INJECTION SUBCUTANEOUS at 06:35

## 2019-09-15 RX ADMIN — AMOXICILLIN AND CLAVULANATE POTASSIUM 1 TABLET: 875; 125 TABLET, FILM COATED ORAL at 17:11

## 2019-09-15 RX ADMIN — FLUTICASONE PROPIONATE 2 SPRAY: 50 SPRAY, METERED NASAL at 09:17

## 2019-09-15 RX ADMIN — Medication 10 ML: at 21:06

## 2019-09-15 RX ADMIN — NYSTATIN: 100000 POWDER TOPICAL at 17:12

## 2019-09-15 NOTE — PROGRESS NOTES
Problem: Breathing Pattern - Ineffective  Goal: *Absence of hypoxia  Outcome: Progressing Towards Goal     Problem: Chronic Obstructive Pulmonary Disease (COPD)  Goal: *Oxygen saturation during activity within specified parameters  Outcome: Progressing Towards Goal     Problem: Risk for Spread of Infection  Goal: Prevent transmission of infectious organism to others  Description  Prevent the transmission of infectious organisms to other patients, staff members, and visitors. Outcome: Progressing Towards Goal     Problem: Patient Education:  Go to Education Activity  Goal: Patient/Family Education  Outcome: Progressing Towards Goal     Problem: Falls - Risk of  Goal: *Absence of Falls  Description  Document Lojocelynne Jay Fall Risk and appropriate interventions in the flowsheet. Outcome: Progressing Towards Goal  Note:   Fall Risk Interventions:  Mobility Interventions: Bed/chair exit alarm    Mentation Interventions: Bed/chair exit alarm, Adequate sleep, hydration, pain control    Medication Interventions: Bed/chair exit alarm    Elimination Interventions: Bed/chair exit alarm, Call light in reach    History of Falls Interventions: Bed/chair exit alarm         Problem: Pressure Injury - Risk of  Goal: *Prevention of pressure injury  Description  Document Jeffrey Scale and appropriate interventions in the flowsheet.   Outcome: Progressing Towards Goal  Note:   Pressure Injury Interventions:  Sensory Interventions: Assess changes in LOC    Moisture Interventions: Absorbent underpads    Activity Interventions: Increase time out of bed, Chair cushion    Mobility Interventions: Chair cushion, Assess need for specialty bed    Nutrition Interventions: Document food/fluid/supplement intake    Friction and Shear Interventions: Lift sheet, Minimize layers                Problem: Patient Education: Go to Patient Education Activity  Goal: Patient/Family Education  Outcome: Progressing Towards Goal

## 2019-09-15 NOTE — PROGRESS NOTES
Hospitalist Progress Note  Edwin Vernon MD  Answering service: [phone] OR [phone] from in house phone  Cell:       Date of Service:  9/15/2019  NAME:  Evelin Hopkins  :  1970  MRN:  [id number]      Admission Summary:   54years old female from home with past medical history significant for COPD, morbid obesity, DM, obstructive sleep apnea presented to the hospital for evaluation of worsening shortness of breath associated with chest pain, patient stated her [facility] was not working and for this reason she not  used her CPAP  because it was very hot, patient denies any fever chills any nausea vomiting any abdominal pain, ABG was done and show elevated CO2 level 75.7. Interval history / Subjective:     Feels ok. Used BIPAP last night now on nasal cannula in no respiratory distress. Probably Discharge in am if  can assure Trilogy/ Bipap issues with her renter (she says she needs AC to be fixed)     Assessment & Plan:     Acute on chronic hypercapnic and hypoxic respiratory failure  Continue BiPAP  DuoNeb nebulizer  - Compliance is the main issue. She says cant use BIPAp when it is too hot as her AC is broke. She says she has  A letter from her doctor so probably will be fixed soon. Chronic diastolic CHF  - On Lasix.     Chest pain   -f/u serial troponin   -echocardiogram was not done as she refused it. Labs not done as she refused.     DM  Continue Lantus  Start patient sliding scale    Morbid obesity    COPD  -continue nebulizer     Chronic lymphedema     HTN   -continue home med         Code status: full  DVT prophylaxis: lovenox    Care Plan discussed with: Patient/Family  Disposition: Home w/Family and TBD     Hospital Problems  Date Reviewed: 3/17/2019          Codes Class Noted POA    COPD exacerbation ([facility] ICD-10-CM: J44.1  ICD-9-CM: 491.21  10/28/2016 Unknown                Review of Systems:   A comprehensive review of systems was negative except for that written in the HPI. Vital Signs:    Last 24hrs VS reviewed since prior progress note. Most recent are:  Visit Vitals  /86 (BP 1 Location: Left arm)   Pulse 70   Temp 97.6 °F (36.4 °C)   Resp 16   Ht 5' 6\" (1.676 m)   Wt (!) 186 kg (410 lb)   SpO2 94%   BMI 66.18 kg/m²         Intake/Output Summary (Last 24 hours) at 9/15/2019 1013  Last data filed at 9/15/2019 0315  Gross per 24 hour   Intake 800 ml   Output 1850 ml   Net -1050 ml        Physical Examination:             Constitutional:  No acute distress, cooperative, pleasant    ENT:  Oral mucous moist, oropharynx benign. Neck supple,    Resp:  CTA bilaterally. No wheezing/rhonchi/rales. No accessory muscle use   CV:  Regular rhythm, normal rate, no murmurs, gallops, rubs    GI:  Soft, non distended, non tender. normoactive bowel sounds, no hepatosplenomegaly     Musculoskeletal:  No edema, warm, 2+ pulses throughout    Neurologic:  Moves all extremities. AAOx3, CN II-XII reviewed     Skin:  Good turgor, no rashes or ulcers       Data Review:    Review and/or order of clinical lab test      Labs:     Recent Labs     09/14/19  0641 09/13/19  2228   WBC 9.2 8.8   HGB 11.9 11.9   HCT 40.8 39.8   * 155     Recent Labs     09/14/19  0641 09/13/19  2228    139   K 4.4 4.2    102   CO2 32 36*   BUN 19 18   CREA 0.86 0.72   * 111*   CA 10.1 9.7     No results for input(s): SGOT, GPT, ALT, AP, TBIL, TBILI, TP, ALB, GLOB, GGT, AML, LPSE in the last 72 hours. No lab exists for component: AMYP, HLPSE  No results for input(s): INR, PTP, APTT in the last 72 hours. No lab exists for component: INREXT   No results for input(s): FE, TIBC, PSAT, FERR in the last 72 hours. No results found for: FOL, RBCF   No results for input(s): PH, PCO2, PO2 in the last 72 hours.   Recent Labs     09/14/19  0623 09/13/19  2228   TROIQ <0.05 <0.05     Lab Results   Component Value Date/Time    Cholesterol, total 152 08/22/2018 02:15 AM    HDL Cholesterol 31 08/22/2018 02:15 AM    LDL, calculated 77.6 08/22/2018 02:15 AM    Triglyceride 217 (H) 08/22/2018 02:15 AM    CHOL/HDL Ratio 4.9 08/22/2018 02:15 AM     Lab Results   Component Value Date/Time    Glucose (POC) 107 (H) 09/15/2019 08:34 AM    Glucose (POC) 186 (H) 09/14/2019 09:43 PM    Glucose (POC) 166 (H) 09/14/2019 04:16 PM    Glucose (POC) 239 (H) 09/14/2019 12:05 PM    Glucose (POC) 213 (H) 09/14/2019 08:23 AM     Lab Results   Component Value Date/Time    Color YELLOW/STRAW 01/09/2019 12:26 AM    Appearance CLEAR 01/09/2019 12:26 AM    Specific gravity 1.011 01/09/2019 12:26 AM    Specific gravity 1.010 05/26/2015 02:43 AM    pH (UA) 8.0 01/09/2019 12:26 AM    Protein NEGATIVE  01/09/2019 12:26 AM    Glucose NEGATIVE  01/09/2019 12:26 AM    Ketone NEGATIVE  01/09/2019 12:26 AM    Bilirubin NEGATIVE  01/09/2019 12:26 AM    Urobilinogen 1.0 01/09/2019 12:26 AM    Nitrites NEGATIVE  01/09/2019 12:26 AM    Leukocyte Esterase NEGATIVE  01/09/2019 12:26 AM    Epithelial cells FEW 01/09/2019 12:26 AM    Bacteria NEGATIVE  01/09/2019 12:26 AM    WBC 0-4 01/09/2019 12:26 AM    RBC 0-5 01/09/2019 12:26 AM         Medications Reviewed:     Current Facility-Administered Medications   Medication Dose Route Frequency    amoxicillin-clavulanate (AUGMENTIN) 875-125 mg per tablet 1 Tab  1 Tab Oral BID WITH MEALS    sodium chloride (NS) flush 5-40 mL  5-40 mL IntraVENous Q8H    sodium chloride (NS) flush 5-40 mL  5-40 mL IntraVENous PRN    acetaminophen (TYLENOL) tablet 650 mg  650 mg Oral Q6H PRN    ondansetron (ZOFRAN) injection 4 mg  4 mg IntraVENous Q6H PRN    bisacodyl (DULCOLAX) tablet 5 mg  5 mg Oral DAILY PRN    enoxaparin (LOVENOX) injection 40 mg  40 mg SubCUTAneous Q12H    albuterol-ipratropium (DUO-NEB) 2.5 MG-0.5 MG/3 ML  3 mL Nebulization Q6H PRN    glucose chewable tablet 16 g  4 Tab Oral PRN    glucagon (GLUCAGEN) injection 1 mg  1 mg IntraMUSCular PRN    dextrose 10% infusion 0-250 mL  0-250 mL IntraVENous PRN    insulin lispro (HUMALOG) injection   SubCUTAneous AC&HS    atorvastatin (LIPITOR) tablet 20 mg  20 mg Oral QHS    metoprolol tartrate (LOPRESSOR) tablet 25 mg  25 mg Oral BID    aspirin chewable tablet 81 mg  81 mg Oral DAILY    nitroglycerin (NITROSTAT) tablet 0.4 mg  0.4 mg SubLINGual PRN    albuterol-ipratropium (DUO-NEB) 2.5 MG-0.5 MG/3 ML  3 mL Nebulization Q6H RT    fluticasone propionate (FLONASE) 50 mcg/actuation nasal spray 2 Spray  2 Spray Both Nostrils DAILY    ranolazine ER (RANEXA) tablet 500 mg  500 mg Oral BID    cyclobenzaprine (FLEXERIL) tablet 5 mg  5 mg Oral BID PRN    furosemide (LASIX) tablet 40 mg  40 mg Oral BID    glyBURIDE (DIABETA) tablet 5 mg  5 mg Oral BID WITH MEALS    insulin glargine (LANTUS) injection 45 Units  45 Units SubCUTAneous DAILY    loratadine (CLARITIN) tablet 10 mg  10 mg Oral DAILY    nystatin (MYCOSTATIN) 100,000 unit/gram powder   Topical BID    budesonide (PULMICORT) 500 mcg/2 ml nebulizer suspension  1,000 mcg Nebulization BID RT     ______________________________________________________________________  EXPECTED LENGTH OF STAY: - - -  ACTUAL LENGTH OF STAY:          1                 Jessica Angela MD

## 2019-09-15 NOTE — PROGRESS NOTES
Problem: Breathing Pattern - Ineffective  Goal: *Absence of hypoxia  Outcome: Progressing Towards Goal     Problem: Chronic Obstructive Pulmonary Disease (COPD)  Goal: *Oxygen saturation during activity within specified parameters  Outcome: Progressing Towards Goal  Goal: *Able to remain out of bed as prescribed  Outcome: Progressing Towards Goal  Goal: *Absence of hypoxia  Outcome: Progressing Towards Goal  Goal: *Optimize nutritional status  Outcome: Progressing Towards Goal     Problem: Patient Education: Go to Patient Education Activity  Goal: Patient/Family Education  Outcome: Progressing Towards Goal     Problem: Risk for Spread of Infection  Goal: Prevent transmission of infectious organism to others  Description  Prevent the transmission of infectious organisms to other patients, staff members, and visitors. Outcome: Progressing Towards Goal     Problem: Patient Education:  Go to Education Activity  Goal: Patient/Family Education  Outcome: Progressing Towards Goal     Problem: Falls - Risk of  Goal: *Absence of Falls  Description  Document Candida Lightning Fall Risk and appropriate interventions in the flowsheet. Outcome: Progressing Towards Goal  Note:   Fall Risk Interventions:  Mobility Interventions: Bed/chair exit alarm         Medication Interventions: Bed/chair exit alarm, Patient to call before getting OOB    Elimination Interventions: Bed/chair exit alarm              Problem: Patient Education: Go to Patient Education Activity  Goal: Patient/Family Education  Outcome: Progressing Towards Goal     Problem: Pressure Injury - Risk of  Goal: *Prevention of pressure injury  Description  Document Jeffrey Scale and appropriate interventions in the flowsheet.   Outcome: Progressing Towards Goal  Note:   Pressure Injury Interventions:  Sensory Interventions: Chair cushion    Moisture Interventions: Absorbent underpads, Maintain skin hydration (lotion/cream)    Activity Interventions: Increase time out of bed, PT/OT evaluation, Pressure redistribution bed/mattress(bed type)    Mobility Interventions: Float heels, HOB 30 degrees or less, Pressure redistribution bed/mattress (bed type)    Nutrition Interventions: Document food/fluid/supplement intake    Friction and Shear Interventions: Apply protective barrier, creams and emollients                Problem: Patient Education: Go to Patient Education Activity  Goal: Patient/Family Education  Outcome: Progressing Towards Goal     Problem: Patient Education: Go to Patient Education Activity  Goal: Patient/Family Education  Outcome: Progressing Towards Goal     Problem: Heart Failure: Day 1  Goal: Off Pathway (Use only if patient is Off Pathway)  Outcome: Progressing Towards Goal  Goal: Activity/Safety  Outcome: Progressing Towards Goal  Goal: Consults, if ordered  Outcome: Progressing Towards Goal  Goal: Diagnostic Test/Procedures  Outcome: Progressing Towards Goal  Goal: Nutrition/Diet  Outcome: Progressing Towards Goal  Goal: Discharge Planning  Outcome: Progressing Towards Goal  Goal: Medications  Outcome: Progressing Towards Goal  Goal: Respiratory  Outcome: Progressing Towards Goal  Goal: Treatments/Interventions/Procedures  Outcome: Progressing Towards Goal  Goal: Psychosocial  Outcome: Progressing Towards Goal  Goal: *Oxygen saturation within defined limits  Outcome: Progressing Towards Goal  Goal: *Hemodynamically stable  Outcome: Progressing Towards Goal  Goal: *Optimal pain control at patient's stated goal  Outcome: Progressing Towards Goal  Goal: *Anxiety reduced or absent  Outcome: Progressing Towards Goal     Problem: Heart Failure: Day 2  Goal: Off Pathway (Use only if patient is Off Pathway)  Outcome: Progressing Towards Goal  Goal: Activity/Safety  Outcome: Progressing Towards Goal  Goal: Consults, if ordered  Outcome: Progressing Towards Goal  Goal: Diagnostic Test/Procedures  Outcome: Progressing Towards Goal  Goal: Nutrition/Diet  Outcome: Progressing Towards Goal  Goal: Discharge Planning  Outcome: Progressing Towards Goal  Goal: Medications  Outcome: Progressing Towards Goal  Goal: Respiratory  Outcome: Progressing Towards Goal  Goal: Treatments/Interventions/Procedures  Outcome: Progressing Towards Goal  Goal: Psychosocial  Outcome: Progressing Towards Goal  Goal: *Oxygen saturation within defined limits  Outcome: Progressing Towards Goal  Goal: *Hemodynamically stable  Outcome: Progressing Towards Goal  Goal: *Optimal pain control at patient's stated goal  Outcome: Progressing Towards Goal  Goal: *Anxiety reduced or absent  Outcome: Progressing Towards Goal  Goal: *Demonstrates progressive activity  Outcome: Progressing Towards Goal     Problem: Heart Failure: Day 3  Goal: Off Pathway (Use only if patient is Off Pathway)  Outcome: Progressing Towards Goal  Goal: Activity/Safety  Outcome: Progressing Towards Goal  Goal: Diagnostic Test/Procedures  Outcome: Progressing Towards Goal  Goal: Nutrition/Diet  Outcome: Progressing Towards Goal  Goal: Discharge Planning  Outcome: Progressing Towards Goal  Goal: Medications  Outcome: Progressing Towards Goal  Goal: Respiratory  Outcome: Progressing Towards Goal  Goal: Treatments/Interventions/Procedures  Outcome: Progressing Towards Goal  Goal: Psychosocial  Outcome: Progressing Towards Goal  Goal: *Oxygen saturation within defined limits  Outcome: Progressing Towards Goal  Goal: *Hemodynamically stable  Outcome: Progressing Towards Goal  Goal: *Optimal pain control at patient's stated goal  Outcome: Progressing Towards Goal  Goal: *Anxiety reduced or absent  Outcome: Progressing Towards Goal  Goal: *Demonstrates progressive activity  Outcome: Progressing Towards Goal     Problem: Heart Failure: Day 4  Goal: Off Pathway (Use only if patient is Off Pathway)  Outcome: Progressing Towards Goal  Goal: Activity/Safety  Outcome: Progressing Towards Goal  Goal: Diagnostic Test/Procedures  Outcome: Progressing Towards Goal  Goal: Nutrition/Diet  Outcome: Progressing Towards Goal  Goal: Discharge Planning  Outcome: Progressing Towards Goal  Goal: Medications  Outcome: Progressing Towards Goal  Goal: Respiratory  Outcome: Progressing Towards Goal  Goal: Treatments/Interventions/Procedures  Outcome: Progressing Towards Goal  Goal: Psychosocial  Outcome: Progressing Towards Goal  Goal: *Oxygen saturation within defined limits  Outcome: Progressing Towards Goal  Goal: *Hemodynamically stable  Outcome: Progressing Towards Goal  Goal: *Optimal pain control at patient's stated goal  Outcome: Progressing Towards Goal  Goal: *Anxiety reduced or absent  Outcome: Progressing Towards Goal  Goal: *Demonstrates progressive activity  Outcome: Progressing Towards Goal     Problem: Heart Failure: Day 5  Goal: Off Pathway (Use only if patient is Off Pathway)  Outcome: Progressing Towards Goal  Goal: Activity/Safety  Outcome: Progressing Towards Goal  Goal: Diagnostic Test/Procedures  Outcome: Progressing Towards Goal  Goal: Nutrition/Diet  Outcome: Progressing Towards Goal  Goal: Discharge Planning  Outcome: Progressing Towards Goal  Goal: Medications  Outcome: Progressing Towards Goal  Goal: Respiratory  Outcome: Progressing Towards Goal  Goal: Treatments/Interventions/Procedures  Outcome: Progressing Towards Goal  Goal: Psychosocial  Outcome: Progressing Towards Goal

## 2019-09-15 NOTE — PROGRESS NOTES
0700: Bedside shift change report given to REMINGTON Sigala RN (oncoming nurse) by Tifafnie Justice RN (offgoing nurse). Report included the following information SBAR and Kardex. Per night shift RN pt has refused labs and echo. 0800: Pt off Bipap. On 5L NC.    1015: MD notified that pt is refusing echo and labs    1900:   Bedside shift change report GIVEN TO Roxann Moe RN. Report included the following information SBAR and Kardex. SIGNIFICANT CHANGES DURING SHIFT:  Pt had a good day, voided well and had BM. CONCERNS TO ADDRESS WITH MD:  D/c planning? [address] NURSING NOTE   Admission Date 9/13/2019   Admission Diagnosis COPD exacerbation ([facility] [J44.1]   Consults IP CONSULT TO PULMONOLOGY      Cardiac Monitoring [x] Yes [] No      Purposeful Hourly Rounding [x] Yes    Yu Score Total Score: 3   Yu score 3 or > [] Bed Alarm [] Avasys [] 1:1 sitter [] Patient refused (Signed refusal form in chart)   Jeffrey Score Jeffrey Score: 18   Jeffrey score 14 or < [] PMT consult [] Wound Care consult    []  Specialty bed  [] Nutrition consult      Influenza Vaccine Received Flu Vaccine for Current Season (usually Sept-March): No    Patient/Guardian Refused (Notify MD): No      Oxygen needs? [] Room air Oxygen @  []1L    []2L    []3L   []4L    [x]5L   []6L via  NC   Chronic home O2 use?  [x] Yes [] No  Perform O2 challenge test and document in progress note using smartphrase (.Homeoxygen)      Last bowel movement Last Bowel Movement Date: 09/15/19      Urinary Catheter             LDAs               Peripheral IV 09/13/19 Right Antecubital (Active)   Site Assessment Clean, dry, & intact 9/15/2019  2:32 PM   Phlebitis Assessment 0 9/15/2019  2:32 PM   Infiltration Assessment 0 9/15/2019  2:32 PM   Dressing Status Clean, dry, & intact 9/15/2019  2:32 PM   Dressing Type Transparent;Tape 9/15/2019  2:32 PM   Hub Color/Line Status Green 9/15/2019  2:32 PM                         Readmission Risk Assessment Tool Score High Risk 30       Total Score        3 Has Seen PCP in Last 6 Months (Yes=3, No=0)    11 IP Visits Last 12 Months (1-3=4, 4=9, >4=11)    9 Pt. Coverage (Medicare=5 , Medicaid, or Self-Pay=4)    7 Charlson Comorbidity Score (Age + Comorbid Conditions)        Criteria that do not apply:    . Living with Significant Other. Assisted Living. LTAC. SNF.  or   Rehab    Patient Length of Stay (>5 days = 3)       Expected Length of Stay - - -   Actual Length of Stay 1

## 2019-09-15 NOTE — PROGRESS NOTES
Bedside, Verbal and Written shift change report GIVEN TO , RN. Report included the following information Kardex. SIGNIFICANT CHANGES DURING SHIFT:        CONCERNS TO ADDRESS WITH MD:      Patient requesting PICC team to draw blood   Pt is a very difficult stick . Refusing ART stick   I tried once and unsuccessful  will pass on to day shift,or if Picc team is not in on Sunday than we could call Ed for ultrasound guided . [facility] NURSING NOTE   Admission Date 9/13/2019   Admission Diagnosis COPD exacerbation ([address] [J44.1]   Consults IP CONSULT TO PULMONOLOGY      Cardiac Monitoring [x] Yes [] No      Purposeful Hourly Rounding [x] Yes    Yu Score Total Score: 2   Yu score 3 or > [] Bed Alarm [] Avasys [] 1:1 sitter [] Patient refused (Signed refusal form in chart)   Jeffrey Score Jeffrey Score: 18   Jeffrey score 14 or < [] PMT consult [] Wound Care consult    []  Specialty bed  [] Nutrition consult      Influenza Vaccine Received Flu Vaccine for Current Season (usually Sept-March): Not Flu Season           Oxygen needs? [] Room air Oxygen @  []1L    []2L    []3L   []4L    []5L   []6L via  NC   5 Liters    Chronic home O2 use? [x] Yes [] No  Perform O2 challenge test and document in progress note using smartphrase (.Homeoxygen)      Last bowel movement Last Bowel Movement Date: 09/14/19      Urinary Catheter             LDAs               Peripheral IV 09/13/19 Right Antecubital (Active)   Site Assessment Clean, dry, & intact 9/14/2019  7:41 PM   Phlebitis Assessment 0 9/14/2019  7:41 PM   Infiltration Assessment 0 9/14/2019  7:41 PM   Dressing Status Clean, dry, & intact 9/14/2019  7:41 PM   Dressing Type Tape;Transparent 9/14/2019  7:41 PM   Hub Color/Line Status Green;Capped; Infiltrated;Patent 9/14/2019  7:41 PM                         Readmission Risk Assessment Tool Score High Risk            30       Total Score        3 Has Seen PCP in Last 6 Months (Yes=3, No=0)    11 IP Visits Last 12 Months (1-3=4, 4=9, >4=11)    9 Pt. Coverage (Medicare=5 , Medicaid, or Self-Pay=4)    7 Charlson Comorbidity Score (Age + Comorbid Conditions)        Criteria that do not apply:    . Living with Significant Other. Assisted Living. LTAC. SNF.  or   Rehab    Patient Length of Stay (>5 days = 3)       Expected Length of Stay - - -   Actual Length of Stay 1

## 2019-09-16 VITALS
HEIGHT: 66 IN | WEIGHT: 293 LBS | DIASTOLIC BLOOD PRESSURE: 66 MMHG | BODY MASS INDEX: 47.09 KG/M2 | TEMPERATURE: 97.5 F | HEART RATE: 67 BPM | SYSTOLIC BLOOD PRESSURE: 119 MMHG | RESPIRATION RATE: 20 BRPM | OXYGEN SATURATION: 96 %

## 2019-09-16 LAB
ANION GAP SERPL CALC-SCNC: 2 MMOL/L (ref 5–15)
BASOPHILS # BLD: 0 K/UL (ref 0–0.1)
BASOPHILS NFR BLD: 0 % (ref 0–1)
BUN SERPL-MCNC: 20 MG/DL (ref 6–20)
BUN/CREAT SERPL: 21 (ref 12–20)
CALCIUM SERPL-MCNC: 9.4 MG/DL (ref 8.5–10.1)
CHLORIDE SERPL-SCNC: 100 MMOL/L (ref 97–108)
CO2 SERPL-SCNC: 36 MMOL/L (ref 21–32)
CREAT SERPL-MCNC: 0.97 MG/DL (ref 0.55–1.02)
DIFFERENTIAL METHOD BLD: ABNORMAL
EOSINOPHIL # BLD: 0.1 K/UL (ref 0–0.4)
EOSINOPHIL NFR BLD: 1 % (ref 0–7)
ERYTHROCYTE [DISTWIDTH] IN BLOOD BY AUTOMATED COUNT: 13.7 % (ref 11.5–14.5)
GLUCOSE BLD STRIP.AUTO-MCNC: 117 MG/DL (ref 65–100)
GLUCOSE SERPL-MCNC: 126 MG/DL (ref 65–100)
HCT VFR BLD AUTO: 42.2 % (ref 35–47)
HGB BLD-MCNC: 12.5 G/DL (ref 11.5–16)
IMM GRANULOCYTES # BLD AUTO: 0 K/UL (ref 0–0.04)
IMM GRANULOCYTES NFR BLD AUTO: 0 % (ref 0–0.5)
LYMPHOCYTES # BLD: 1.4 K/UL (ref 0.8–3.5)
LYMPHOCYTES NFR BLD: 17 % (ref 12–49)
MCH RBC QN AUTO: 27.7 PG (ref 26–34)
MCHC RBC AUTO-ENTMCNC: 29.6 G/DL (ref 30–36.5)
MCV RBC AUTO: 93.4 FL (ref 80–99)
MONOCYTES # BLD: 0.6 K/UL (ref 0–1)
MONOCYTES NFR BLD: 8 % (ref 5–13)
NEUTS SEG # BLD: 6 K/UL (ref 1.8–8)
NEUTS SEG NFR BLD: 74 % (ref 32–75)
NRBC # BLD: 0 K/UL (ref 0–0.01)
NRBC BLD-RTO: 0 PER 100 WBC
PLATELET # BLD AUTO: 152 K/UL (ref 150–400)
PMV BLD AUTO: 10.9 FL (ref 8.9–12.9)
POTASSIUM SERPL-SCNC: 3.9 MMOL/L (ref 3.5–5.1)
RBC # BLD AUTO: 4.52 M/UL (ref 3.8–5.2)
SERVICE CMNT-IMP: ABNORMAL
SODIUM SERPL-SCNC: 138 MMOL/L (ref 136–145)
WBC # BLD AUTO: 8.1 K/UL (ref 3.6–11)

## 2019-09-16 PROCEDURE — 90471 IMMUNIZATION ADMIN: CPT

## 2019-09-16 PROCEDURE — 77010033678 HC OXYGEN DAILY

## 2019-09-16 PROCEDURE — 74011250637 HC RX REV CODE- 250/637: Performed by: INTERNAL MEDICINE

## 2019-09-16 PROCEDURE — 74011250637 HC RX REV CODE- 250/637: Performed by: HOSPITALIST

## 2019-09-16 PROCEDURE — 74011000250 HC RX REV CODE- 250: Performed by: INTERNAL MEDICINE

## 2019-09-16 PROCEDURE — 36415 COLL VENOUS BLD VENIPUNCTURE: CPT

## 2019-09-16 PROCEDURE — 94660 CPAP INITIATION&MGMT: CPT

## 2019-09-16 PROCEDURE — 74011250636 HC RX REV CODE- 250/636: Performed by: INTERNAL MEDICINE

## 2019-09-16 PROCEDURE — 80048 BASIC METABOLIC PNL TOTAL CA: CPT

## 2019-09-16 PROCEDURE — 82962 GLUCOSE BLOOD TEST: CPT

## 2019-09-16 PROCEDURE — 94640 AIRWAY INHALATION TREATMENT: CPT

## 2019-09-16 PROCEDURE — 74011636637 HC RX REV CODE- 636/637: Performed by: HOSPITALIST

## 2019-09-16 PROCEDURE — 90686 IIV4 VACC NO PRSV 0.5 ML IM: CPT | Performed by: HOSPITALIST

## 2019-09-16 PROCEDURE — 85025 COMPLETE CBC W/AUTO DIFF WBC: CPT

## 2019-09-16 PROCEDURE — 74011250636 HC RX REV CODE- 250/636: Performed by: HOSPITALIST

## 2019-09-16 RX ORDER — AMOXICILLIN AND CLAVULANATE POTASSIUM 875; 125 MG/1; MG/1
1 TABLET, FILM COATED ORAL 2 TIMES DAILY WITH MEALS
Qty: 8 TAB | Refills: 0 | Status: SHIPPED | OUTPATIENT
Start: 2019-09-16 | End: 2019-09-20

## 2019-09-16 RX ORDER — NYSTATIN 100000 U/G
CREAM TOPICAL 2 TIMES DAILY
Qty: 15 G | Refills: 0 | Status: SHIPPED | OUTPATIENT
Start: 2019-09-16

## 2019-09-16 RX ADMIN — FLUTICASONE PROPIONATE 2 SPRAY: 50 SPRAY, METERED NASAL at 12:01

## 2019-09-16 RX ADMIN — INSULIN GLARGINE 45 UNITS: 100 INJECTION, SOLUTION SUBCUTANEOUS at 09:25

## 2019-09-16 RX ADMIN — RANOLAZINE 500 MG: 500 TABLET, FILM COATED, EXTENDED RELEASE ORAL at 09:26

## 2019-09-16 RX ADMIN — AMOXICILLIN AND CLAVULANATE POTASSIUM 1 TABLET: 875; 125 TABLET, FILM COATED ORAL at 09:24

## 2019-09-16 RX ADMIN — ENOXAPARIN SODIUM 40 MG: 40 INJECTION SUBCUTANEOUS at 06:19

## 2019-09-16 RX ADMIN — METOPROLOL TARTRATE 25 MG: 25 TABLET ORAL at 09:26

## 2019-09-16 RX ADMIN — BUDESONIDE 500 MCG: 0.5 INHALANT RESPIRATORY (INHALATION) at 07:48

## 2019-09-16 RX ADMIN — LORATADINE 10 MG: 10 TABLET ORAL at 09:26

## 2019-09-16 RX ADMIN — GLYBURIDE 5 MG: 5 TABLET ORAL at 09:25

## 2019-09-16 RX ADMIN — NYSTATIN: 100000 POWDER TOPICAL at 09:33

## 2019-09-16 RX ADMIN — INFLUENZA VIRUS VACCINE 0.5 ML: 15; 15; 15; 15 SUSPENSION INTRAMUSCULAR at 11:57

## 2019-09-16 RX ADMIN — ASPIRIN 81 MG 81 MG: 81 TABLET ORAL at 09:24

## 2019-09-16 RX ADMIN — IPRATROPIUM BROMIDE AND ALBUTEROL SULFATE 3 ML: .5; 3 SOLUTION RESPIRATORY (INHALATION) at 07:48

## 2019-09-16 RX ADMIN — CYCLOBENZAPRINE HYDROCHLORIDE 5 MG: 10 TABLET, FILM COATED ORAL at 09:26

## 2019-09-16 RX ADMIN — FUROSEMIDE 40 MG: 40 TABLET ORAL at 09:24

## 2019-09-16 NOTE — DISCHARGE INSTRUCTIONS
HOSPITALIST DISCHARGE INSTRUCTIONS    NAME: Graham Castillo   :  1970   MRN:  [id number]     Date/Time:  2019 8:38 AM    ADMIT DATE: 2019     DISCHARGE DATE: 2019     DISCHARGE DIAGNOSIS:  Acute on chronic hypercapnic and hypoxic respiratory failure- due to non compliance with BIPAP/Trilogy machine at home, recommend compliance- pt understands, empiric Amoxicillin x 4 more days  Chronic diastolic CHF  DM  Super Morbid Obesity /Cutaneous candidiasis POA- cont nystatin  COPD  HTN    Active Problems:    COPD exacerbation ([facility] (10/28/2016)         MEDICATIONS:  As per medication reconciliation  list  · It is important that you take the medication exactly as they are prescribed. · Keep your medication in the bottles provided by the pharmacist and keep a list of the medication names, dosages, and times to be taken in your wallet. · Do not take other medications without consulting your doctor. Pain Management: per above medications    What to do at Home    Recommended diet:  Cardiac Diet, Diabetic Diet and Low fat, Low cholesterol    Recommended activity: Activity as tolerated    If you have questions regarding the hospital related prescriptions or hospital related issues please call [facility] tyrel Campoverde at . If you experience any of the following symptoms then please call your primary care physician or return to the emergency room if you cannot get hold of your doctor:  Fever, chills, nausea, vomiting, diarrhea, change in mentation, falling, bleeding, shortness of breath,     Follow Up:  Dr. Jamshid Geronimo MD  you are to call and set up an appointment to see them in 7-10 days. Information obtained by :  I understand that if any problems occur once I am at home I am to contact my physician. I understand and acknowledge receipt of the instructions indicated above. Physician's or R.N.'s Signature                                                                  Date/Time                                                                                                                                              Patient or Representative Signature                                                          Date/Time

## 2019-09-16 NOTE — PROGRESS NOTES
Problem: Breathing Pattern - Ineffective  Goal: *Absence of hypoxia  Outcome: Progressing Towards Goal     Problem: Chronic Obstructive Pulmonary Disease (COPD)  Goal: *Oxygen saturation during activity within specified parameters  Outcome: Progressing Towards Goal  Goal: *Optimize nutritional status  Outcome: Progressing Towards Goal     Problem: Falls - Risk of  Goal: *Absence of Falls  Description  Document Dexter Santillan Fall Risk and appropriate interventions in the flowsheet. Outcome: Progressing Towards Goal  Note:   Fall Risk Interventions:  Mobility Interventions: Bed/chair exit alarm, Patient to call before getting OOB    Mentation Interventions: Bed/chair exit alarm    Medication Interventions: Bed/chair exit alarm, Patient to call before getting OOB    Elimination Interventions: Bed/chair exit alarm, Call light in reach    History of Falls Interventions: Bed/chair exit alarm         Problem: Pressure Injury - Risk of  Goal: *Prevention of pressure injury  Description  Document Jeffrey Scale and appropriate interventions in the flowsheet.   Outcome: Progressing Towards Goal  Note:   Pressure Injury Interventions:  Sensory Interventions: Assess changes in LOC    Moisture Interventions: Absorbent underpads, Minimize layers    Activity Interventions: Pressure redistribution bed/mattress(bed type)    Mobility Interventions: Pressure redistribution bed/mattress (bed type)    Nutrition Interventions: Document food/fluid/supplement intake    Friction and Shear Interventions: Minimize layers

## 2019-09-16 NOTE — ROUTINE PROCESS
[facility] visit scheduled for 9/18/19 at [facility] will contact patient for additional information and to confirm visit.   Apt added to AVS

## 2019-09-16 NOTE — PROGRESS NOTES
Reason for Admission:   COPD               RRAT Score:    30 High risk              Resources/supports as identified by patient/family:   Pt has family support                Top Challenges facing patient (as identified by patient/family and CM): Finances/Medication cost?   No issues with finances or medication cost. Pt has Medicare A/B and Medicaid. Transportation? Pt does not drive. Pt has transportation services via her insurance. Support system or lack thereof? Pt has personal care aide for 6 hours a day. Pt has support family that assist her in the home. Living arrangements? Pt resides alone in a one level home with 5 NIKOS              Self-care/ADLs/Cognition? Pt needs assistance with ADL's and IADL's. Pt was alert and oriented           Current Advanced Directive/Advance Care Plan:  Pt is FULL code status. Pt has an ACP on file. Plan for utilizing home health:   Pt has utilized [facility] in the past. Pt was not willing to utilize [facility] at d/c. Transition of Care Plan:   Home  Follow-Up Appointment    CM met with pt at bedside to discuss d/c plan. Pt was alert and oriented. CM verified pt's demographics, insurance and PCP. Pt is a 53 y/o [place] American female admitted to [facility] on 9/14/19 for COPD. Pt's PCP is Dr. Bill Bailey. Pt sees PCP once a month. Pt's PCP provides home base services. Pt uses [facility] pharmacy on [address] for Rx. Pt resides alone in a one level home with 5 NIKOS. Pt needs assistance with ADL's and IADL's such as bathing, clothing and cooking. Pt has personal care aide for 6 hours a day. Pt has a walker, wheelchair, high toilet seat, shower chair, hospital bed, oxygen and trilogy machine. Pt has utilized [facility] in the past. No SNF or acute inpatient rehab in the past. Pt is FULL code status. Pt has an ACP on file.  Pt will need ambulance transport at d/c.    CM discussed with pt about home health services. Pt declined home health services. CM sent email to CM Specialist to schedule follow-up appointment    CM set up medical transport with [facility] via [employer]  time 12:00pm. [facility] paperwork placed in pt's chart. Care Management Interventions  PCP Verified by CM: Yes(Pt's PCP is Dr. Roopa Plascencia. Pt sees PCP once a month. PCP comes to her home. )  Mode of Transport at Discharge: BLS(Pt needs ambulance transport. )  Transition of Care Consult (CM Consult): Other(Home )  Discharge Durable Medical Equipment: No(Pt has a walker, wheelchair, high toilet seat, shower chair, hospital bed, oxygen and trilogy. )  Physical Therapy Consult: Yes  Occupational Therapy Consult: No  Speech Therapy Consult: No  Current Support Network: Own Home, Has Personal Caregivers, Lives Alone, Family Lives Nearby(Pt resides alone in a one level home with 5 NIKOS)  Confirm Follow Up Transport: Other (see comment)(Pt uses transportation services via her insurance )  Plan discussed with Pt/Family/Caregiver: Yes  Discharge Location  Discharge Placement: Home(Home with follow-up appointments. )    No further CM needs identified. CM notified pt's nurse of d/c.     Brianna Henning [address] [place] [address]  [phone]

## 2019-09-16 NOTE — PROGRESS NOTES
ADULT PROTOCOL: JET AEROSOL ASSESSMENT    Patient  Georgette Stacie     54 y.o.   female     9/15/2019  11:57 PM    Breath Sounds Pre Procedure: Right Breath Sounds: Upper, Diminished, Wheezing(faint)                               Left Breath Sounds: Diminished    Breath Sounds Post Procedure: Right Breath Sounds: Diminished                                 Left Breath Sounds: Diminished    Breathing pattern: Pre procedure Breathing Pattern: Regular          Post procedure Breathing Pattern: Regular    Heart Rate: Pre procedure Pulse: 75           Post procedure Pulse: 79    Resp Rate: Pre procedure Respirations: 20           Post procedure Respirations: 20    Peak Flow: Pre bronchodilator             Post bronchodilator       FVC/FEV1:  N/A    Incentive Spirometry:             Cough: Pre procedure Cough: Non-productive               Post procedure Cough: Non-productive    Suctioned: NO    Sputum: Pre procedure                   Post procedure      Oxygen: O2 Device: Nasal cannula   5L NC     Changed: NO    SpO2: Pre procedure SpO2: 91 %   with oxygen              Post procedure SpO2: 91 %  with oxygen    Nebulizer Therapy: Current medications Aerosolized Medications: DuoNeb, Pulmicort      Changed: NO    Smoking History:     Problem List:   Patient Active Problem List   Diagnosis Code    Malignant essential hypertension I10    Asthma J45.909    Obesity hypoventilation syndrome ([facility]) E66.2    Dyspnea R06.00    Chest pressure R07.89    Acute on chronic diastolic heart failure ([facility]) I50.33    NSTEMI (non-ST elevated myocardial infarction) ([facility]) I21.4    S/P PTCA (percutaneous transluminal coronary angioplasty) Z98.61    Coronary atherosclerosis of native coronary artery I25.10    Hypoxia R09.02    Noncompliance with therapeutic plan Z91.11    Chest pain R07.9    GERD (gastroesophageal reflux disease) K21.9    Acute respiratory failure with hypoxia and hypercarbia ([facility]) J96.01, J96.02    COPD (chronic obstructive pulmonary disease) ([facility]) J44.9    Tobacco abuse Z72.0    BMI 60.0-69.9, adult ([facility]) Z68.44    Cellulitis L03.90    SIRS due to infectious process with acute organ dysfunction ([facility]) A41.9, R65.20    Sepsis associated hypotension ([facility]) A41.9, I95.9    Gangrenous toe ([facility]) I96    Type II diabetes mellitus, uncontrolled ([facility]) E11.65    HTN (hypertension) B77    Diastolic CHF, chronic ([facility]) I50.32    Cough with hemoptysis R04.2    Lymphedema of both lower extremities I89.0    CAP (community acquired pneumonia) J18.9    Cellulitis, leg L03.119    COPD exacerbation ([facility]) J44.1    Maggot infestation B87.9    Shortness of breath R06.02    Multifocal pneumonia J18.9    Acute respiratory failure ([facility]) J96.00    Acute on chronic respiratory failure with hypoxia and hypercapnia ([facility]) J96.21, J96.22    NSVT (nonsustained ventricular tachycardia) ([facility]) I47.2    Respiratory failure ([facility]) J96.90    Elevated lipase R74.8    Abdominal pain R10.9    Counseling regarding goals of care Z71.89    Acute pancreatitis K85.90    Breast cancer ([facility]) C50.919    Intertrigo L30.4    PNA (pneumonia) J18.9    Productive cough R05    Other fatigue R53.83    Acute chest pain R07.9    Acute on chronic respiratory failure with hypoxia ([facility]) J96.21    Acute on chronic respiratory failure with hypercapnia [facility]) J96.22       Respiratory Therapist: Berta Burrows, RT

## 2019-09-16 NOTE — PROGRESS NOTES
patient discharged. Went over all of her meds and her follow up with her physician. Discussed the care of her LE. Encouraged her to wash them in the shower daily to clean any drainage or bacteria. She has used special lotion on them before, encouraged her to continue.

## 2019-09-16 NOTE — PROGRESS NOTES
Bedside shift change report GIVEN TO NORMA Gu. Report included the following information SBAR. SIGNIFICANT CHANGES DURING SHIFT:       Pt does not want to be put on bipap after repeatedly asking. Pt currently on 5LO2 0200 - pt allowing respiratory to place on bipap        CONCERNS TO ADDRESS WITH MD:              [facility] NURSING NOTE   Admission Date 9/13/2019   Admission Diagnosis COPD exacerbation ([address] [J44.1]   Consults IP CONSULT TO PULMONOLOGY      Cardiac Monitoring [x] Yes [] No      Purposeful Hourly Rounding [x] Yes    Yu Score Total Score: 3   Yu score 3 or > [x] Bed Alarm [] Avasys [] 1:1 sitter [] Patient refused (Signed refusal form in chart)   Jeffrey Score Jeffrey Score: 18   Jeffrey score 14 or < [] PMT consult [] Wound Care consult    []  Specialty bed  [] Nutrition consult      Influenza Vaccine Received Flu Vaccine for Current Season (usually Sept-March): No    Patient/Guardian Refused (Notify MD): No      Oxygen needs? [] Room air Oxygen @  []1L    []2L    []3L   []4L    [x]5L   []6L via  NC  Bipap at night   Chronic home O2 use? [x] Yes [] No  Perform O2 challenge test and document in progress note using [employer])      Last bowel movement Last Bowel Movement Date: 09/15/19      Urinary Catheter             LDAs               Peripheral IV 09/13/19 Right Antecubital (Active)   Site Assessment Clean, dry, & intact 9/16/2019  4:00 AM   Phlebitis Assessment 0 9/16/2019  4:00 AM   Infiltration Assessment 0 9/16/2019  4:00 AM   Dressing Status Clean, dry, & intact 9/16/2019  4:00 AM   Dressing Type Transparent 9/16/2019  4:00 AM   Hub Color/Line Status Green 9/16/2019  4:00 AM                         Readmission Risk Assessment Tool Score High Risk            30       Total Score        3 Has Seen PCP in Last 6 Months (Yes=3, No=0)    11 IP Visits Last 12 Months (1-3=4, 4=9, >4=11)    9 Pt.  Coverage (Medicare=5 , Medicaid, or Self-Pay=4)    7 Charlson Comorbidity Score (Age + Comorbid Conditions)        Criteria that do not apply:    . Living with Significant Other. Assisted Living. LTAC. SNF.  or   Rehab    Patient Length of Stay (>5 days = 3)       Expected Length of Stay - - -   Actual Length of Stay 2

## 2019-10-30 ENCOUNTER — HOSPITAL ENCOUNTER (EMERGENCY)
Age: 49
Discharge: HOME | End: 2019-10-30
Attending: EMERGENCY MEDICINE
Payer: MEDICARE

## 2019-10-30 ENCOUNTER — APPOINTMENT (OUTPATIENT)
Dept: GENERAL RADIOLOGY | Age: 49
End: 2019-10-30
Attending: EMERGENCY MEDICINE
Payer: MEDICARE

## 2019-10-30 VITALS
SYSTOLIC BLOOD PRESSURE: 122 MMHG | TEMPERATURE: 98.6 F | OXYGEN SATURATION: 100 % | DIASTOLIC BLOOD PRESSURE: 83 MMHG | RESPIRATION RATE: 17 BRPM

## 2019-10-30 DIAGNOSIS — R53.83 FATIGUE, UNSPECIFIED TYPE: ICD-10-CM

## 2019-10-30 DIAGNOSIS — E86.0 DEHYDRATION: Primary | ICD-10-CM

## 2019-10-30 LAB
ALBUMIN SERPL-MCNC: 3.3 G/DL (ref 3.5–5)
ALBUMIN/GLOB SERPL: 0.6 {RATIO} (ref 1.1–2.2)
ALP SERPL-CCNC: 113 U/L (ref 45–117)
ALT SERPL-CCNC: 18 U/L (ref 12–78)
ANION GAP SERPL CALC-SCNC: 4 MMOL/L (ref 5–15)
APPEARANCE UR: CLEAR
AST SERPL-CCNC: 11 U/L (ref 15–37)
BASOPHILS # BLD: 0 K/UL (ref 0–0.1)
BASOPHILS NFR BLD: 0 % (ref 0–1)
BILIRUB SERPL-MCNC: 0.4 MG/DL (ref 0.2–1)
BILIRUB UR QL: NEGATIVE
BNP SERPL-MCNC: 462 PG/ML
BUN SERPL-MCNC: 13 MG/DL (ref 6–20)
BUN/CREAT SERPL: 18 (ref 12–20)
CALCIUM SERPL-MCNC: 10.2 MG/DL (ref 8.5–10.1)
CHLORIDE SERPL-SCNC: 102 MMOL/L (ref 97–108)
CO2 SERPL-SCNC: 36 MMOL/L (ref 21–32)
COLOR UR: NORMAL
COMMENT, HOLDF: NORMAL
CREAT SERPL-MCNC: 0.71 MG/DL (ref 0.55–1.02)
DIFFERENTIAL METHOD BLD: ABNORMAL
EOSINOPHIL # BLD: 0.1 K/UL (ref 0–0.4)
EOSINOPHIL NFR BLD: 1 % (ref 0–7)
ERYTHROCYTE [DISTWIDTH] IN BLOOD BY AUTOMATED COUNT: 12.7 % (ref 11.5–14.5)
FLUAV AG NPH QL IA: NEGATIVE
FLUBV AG NOSE QL IA: NEGATIVE
GLOBULIN SER CALC-MCNC: 5.5 G/DL (ref 2–4)
GLUCOSE SERPL-MCNC: 146 MG/DL (ref 65–100)
GLUCOSE UR STRIP.AUTO-MCNC: NEGATIVE MG/DL
HCT VFR BLD AUTO: 47.5 % (ref 35–47)
HGB BLD-MCNC: 13.4 G/DL (ref 11.5–16)
HGB UR QL STRIP: NEGATIVE
IMM GRANULOCYTES # BLD AUTO: 0.1 K/UL (ref 0–0.04)
IMM GRANULOCYTES NFR BLD AUTO: 1 % (ref 0–0.5)
KETONES UR QL STRIP.AUTO: NEGATIVE MG/DL
LEUKOCYTE ESTERASE UR QL STRIP.AUTO: NEGATIVE
LYMPHOCYTES # BLD: 0.8 K/UL (ref 0.8–3.5)
LYMPHOCYTES NFR BLD: 9 % (ref 12–49)
MAGNESIUM SERPL-MCNC: 2.2 MG/DL (ref 1.6–2.4)
MCH RBC QN AUTO: 27.8 PG (ref 26–34)
MCHC RBC AUTO-ENTMCNC: 28.2 G/DL (ref 30–36.5)
MCV RBC AUTO: 98.5 FL (ref 80–99)
MONOCYTES # BLD: 0.7 K/UL (ref 0–1)
MONOCYTES NFR BLD: 8 % (ref 5–13)
NEUTS SEG # BLD: 7.6 K/UL (ref 1.8–8)
NEUTS SEG NFR BLD: 81 % (ref 32–75)
NITRITE UR QL STRIP.AUTO: NEGATIVE
NRBC # BLD: 0 K/UL (ref 0–0.01)
NRBC BLD-RTO: 0 PER 100 WBC
PH UR STRIP: 5.5 [PH] (ref 5–8)
PLATELET # BLD AUTO: 123 K/UL (ref 150–400)
PMV BLD AUTO: 10.9 FL (ref 8.9–12.9)
POTASSIUM SERPL-SCNC: 4.5 MMOL/L (ref 3.5–5.1)
PROT SERPL-MCNC: 8.8 G/DL (ref 6.4–8.2)
PROT UR STRIP-MCNC: NEGATIVE MG/DL
RBC # BLD AUTO: 4.82 M/UL (ref 3.8–5.2)
RBC MORPH BLD: ABNORMAL
SAMPLES BEING HELD,HOLD: NORMAL
SODIUM SERPL-SCNC: 142 MMOL/L (ref 136–145)
SP GR UR REFRACTOMETRY: 1.02 (ref 1–1.03)
TROPONIN I SERPL-MCNC: <0.05 NG/ML
UROBILINOGEN UR QL STRIP.AUTO: 1 EU/DL (ref 0.2–1)
WBC # BLD AUTO: 9.3 K/UL (ref 3.6–11)

## 2019-10-30 PROCEDURE — 87804 INFLUENZA ASSAY W/OPTIC: CPT

## 2019-10-30 PROCEDURE — 99285 EMERGENCY DEPT VISIT HI MDM: CPT

## 2019-10-30 PROCEDURE — 96360 HYDRATION IV INFUSION INIT: CPT

## 2019-10-30 PROCEDURE — 74011250636 HC RX REV CODE- 250/636: Performed by: EMERGENCY MEDICINE

## 2019-10-30 PROCEDURE — 81003 URINALYSIS AUTO W/O SCOPE: CPT

## 2019-10-30 PROCEDURE — 83735 ASSAY OF MAGNESIUM: CPT

## 2019-10-30 PROCEDURE — 83880 ASSAY OF NATRIURETIC PEPTIDE: CPT

## 2019-10-30 PROCEDURE — 36415 COLL VENOUS BLD VENIPUNCTURE: CPT

## 2019-10-30 PROCEDURE — 80053 COMPREHEN METABOLIC PANEL: CPT

## 2019-10-30 PROCEDURE — 85025 COMPLETE CBC W/AUTO DIFF WBC: CPT

## 2019-10-30 PROCEDURE — 84484 ASSAY OF TROPONIN QUANT: CPT

## 2019-10-30 PROCEDURE — 71045 X-RAY EXAM CHEST 1 VIEW: CPT

## 2019-10-30 RX ADMIN — SODIUM CHLORIDE 500 ML: 900 INJECTION, SOLUTION INTRAVENOUS at 17:45

## 2019-10-30 NOTE — ED PROVIDER NOTES
EMERGENCY DEPARTMENT HISTORY AND PHYSICAL EXAM      Date: 10/30/2019  Patient Name: Amanda Caldwell    History of Presenting Illness     Chief Complaint   Patient presents with    Fatigue       History Provided By: Patient and EMS    HPI: Amanda Caldwell, 54 y.o. female presents to the ED with cc of fatigue. The patient's symptoms began this morning. Patient states that she has generalized weakness. She feels shaky and has chills, but has not measured her temperature. She has been eating and drinking normally. She denies chest pain, abdominal pain, vomiting or diarrhea. She also denies dysuria, but states she has some increased shortness of breath. She is on 6 L of oxygen normally. Has a mild headache. She denies leg pain or increased leg edema. There are no other complaints, changes, or physical findings at this time. PCP: Adria Guevara MD    No current facility-administered medications on file prior to encounter. Current Outpatient Medications on File Prior to Encounter   Medication Sig Dispense Refill    nystatin (MYCOSTATIN) topical cream Apply  to affected area two (2) times a day. 15 g 0    alum-mag hydroxide-simeth (MAALOX ADVANCED) 200-200-20 mg/5 mL susp Take 30 mL by mouth every four (4) hours as needed for Pain. 354 mL 0    ranolazine ER (RANEXA) 500 mg SR tablet Take 1 Tab by mouth two (2) times a day. 60 Tab 8    cyclobenzaprine (FLEXERIL) 5 mg tablet Take 1 Tab by mouth two (2) times daily as needed for Muscle Spasm(s). flexeriol 5 Tab 0    albuterol-ipratropium (DUO-NEB) 2.5 mg-0.5 mg/3 ml nebu 3 mL by Nebulization route four (4) times daily. 100 Nebule 1    furosemide (LASIX) 40 mg tablet Take 40 mg by mouth two (2) times a day.  fluticasone (FLONASE) 50 mcg/actuation nasal spray 2 Sprays by Both Nostrils route daily. 1 Bottle 0    insulin glargine (LANTUS) 100 unit/mL injection 45 Units by SubCUTAneous route daily.       metoprolol tartrate (LOPRESSOR) 25 mg tablet Take 25 mg by mouth two (2) times a day.  fluticasone-salmeterol (ADVAIR DISKUS) 500-50 mcg/dose diskus inhaler Take 1 Puff by inhalation every twelve (12) hours.  loratadine (CLARITIN) 10 mg tablet Take 10 mg by mouth daily.  albuterol (VENTOLIN HFA) 90 mcg/actuation inhaler Take 2 Puffs by inhalation every six (6) hours as needed for Wheezing.  aspirin 81 mg chewable tablet Take 81 mg by mouth daily.  hydrOXYzine pamoate (VISTARIL) 50 mg capsule Take 50 mg by mouth every eight (8) hours as needed for Itching.  lovastatin (MEVACOR) 40 mg tablet Take 1 Tab by mouth nightly. 30 Tab 6    glyBURIDE (DIABETA) 5 mg tablet Take 5 mg by mouth two (2) times daily (with meals).  ammonium lactate (LAC-HYDRIN) 12 % topical cream rub in to affected area well 280 g 1    nitroglycerin (NITROSTAT) 0.4 mg SL tablet 1 Tab by SubLINGual route every five (5) minutes as needed for Chest Pain (call 911 if not relieved by 3).  25 Tab 2       Past History     Past Medical History:  Past Medical History:   Diagnosis Date    Arthritis     Asthma     Breast lump     CAD (coronary artery disease)     Congestive heart failure (HCC)     COPD (chronic obstructive pulmonary disease) (HCC)     [facility]     Hypertension     Morbid obesity with BMI of 70 and over, adult ([facility]     NSTEMI (non-ST elevated myocardial infarction) ([facility]     SVT (supraventricular tachycardia) ([facility]        Past Surgical History:  Past Surgical History:   Procedure Laterality Date    CARDIAC SURG PROCEDURE UNLIST      Stents    HX  SECTION      HX ORTHOPAEDIC      HX OTHER SURGICAL      cyst removed from back       Family History:  Family History   Problem Relation Age of Onset    Heart Disease Mother     Heart Disease Father     Heart Disease Sister     Breast Cancer Maternal Grandmother     Breast Cancer Paternal Grandmother        Social History:  Social History     Tobacco Use    Smoking status: Former Smoker     Packs/day: 0.25     Types: Cigarettes    Smokeless tobacco: Never Used    Tobacco comment: Patient states \"I  aint smoking no more d*mn cigarettes after yesterday\" 01/26/2018   Substance Use Topics    Alcohol use: No    Drug use: No       Allergies:  No Known Allergies      Review of Systems   Review of Systems   Constitutional: Positive for chills. Negative for fever. HENT: Negative for congestion. Eyes: Negative. Respiratory: Positive for shortness of breath. Negative for cough. Cardiovascular: Negative for chest pain. Gastrointestinal: Negative for abdominal pain. Endocrine: Negative for heat intolerance. Genitourinary: Negative for dysuria. Musculoskeletal: Negative for back pain. Skin: Negative for rash. Allergic/Immunologic: Negative for immunocompromised state. Neurological: Positive for headaches. Hematological: Does not bruise/bleed easily. Psychiatric/Behavioral: Negative. All other systems reviewed and are negative. Physical Exam   Physical Exam   Constitutional: She is oriented to person, place, and time. No distress. Morbidly obese   HENT:   Head: Normocephalic. Eyes: Pupils are equal, round, and reactive to light. EOM are normal.   Neck: Normal range of motion. Neck supple. Cardiovascular: Normal rate, regular rhythm, normal heart sounds and intact distal pulses. Pulmonary/Chest: Effort normal and breath sounds normal. No respiratory distress. Abdominal: Soft. Bowel sounds are normal. There is no tenderness. Musculoskeletal: Normal range of motion. Neurological: She is alert and oriented to person, place, and time. No focal deficits   Skin: Skin is warm and dry. Psychiatric: She has a normal mood and affect. Her behavior is normal.   Nursing note and vitals reviewed.       Diagnostic Study Results     Labs -     Recent Results (from the past 12 hour(s))   INFLUENZA A & B AG (RAPID TEST)    Collection Time: 10/30/19 6:00 PM   Result Value Ref Range    Influenza A Antigen NEGATIVE  NEG      Influenza B Antigen NEGATIVE  NEG     SAMPLES BEING HELD    Collection Time: 10/30/19  9:18 PM   Result Value Ref Range    SAMPLES BEING HELD        Add-on orders for these samples will be processed based on acceptable specimen integrity and analyte stability, which may vary by analyte. COMMENT        Add-on orders for these samples will be processed based on acceptable specimen integrity and analyte stability, which may vary by analyte. URINALYSIS W/ RFLX MICROSCOPIC    Collection Time: 10/30/19  9:19 PM   Result Value Ref Range    Color YELLOW/STRAW      Appearance CLEAR CLEAR      Specific gravity 1.020 1.003 - 1.030      pH (UA) 5.5 5.0 - 8.0      Protein NEGATIVE  NEG mg/dL    Glucose NEGATIVE  NEG mg/dL    Ketone NEGATIVE  NEG mg/dL    Bilirubin NEGATIVE  NEG      Blood NEGATIVE  NEG      Urobilinogen 1.0 0.2 - 1.0 EU/dL    Nitrites NEGATIVE  NEG      Leukocyte Esterase NEGATIVE  NEG         Radiologic Studies -   XR CHEST SNGL V   Final Result   IMPRESSION:      1. Stable moderately severe cardiomegaly   2. No brooks pulmonary edema pattern is suggested, but image quality is poor due   to motion artifact and large size. A standard 2 view examination is recommended   when clinically possible. .  . CT Results  (Last 48 hours)    None        CXR Results  (Last 48 hours)    None          Medical Decision Making   I am the first provider for this patient. I reviewed the vital signs, available nursing notes, past medical history, past surgical history, family history and social history. Vital Signs-Reviewed the patient's vital signs.   Patient Vitals for the past 12 hrs:   Temp Resp BP SpO2   10/30/19 1600   117/81 96 %   10/30/19 1512    97 %   10/30/19 1509 98.6 °F (37 °C) 17 121/74 97 %         Records Reviewed: Nursing Notes, Old Medical Records, Ambulance Run Sheet, Previous Radiology Studies and Previous Laboratory Studies    Provider Notes (Medical Decision Making):   Viral syndrome, dehydration, electrolyte abnormality, anemia, UTI    ED Course:   Initial assessment performed. The patients presenting problems have been discussed, and they are in agreement with the care plan formulated and outlined with them. I have encouraged them to ask questions as they arise throughout their visit. Progress note: The patient is feeling better. Her results were reviewed. She is advised to follow-up and return to ER if worse       Critical Care Time:   0    Disposition:  home    PLAN:  1. Discharge Medication List as of 10/30/2019  9:31 PM        2. Follow-up Information     Follow up With Specialties Details Why Contact Info    Renetta Flowers MD General Practice In 2 days As needed [id number]  [phone]      [facility] EMERGENCY DEPT Emergency Medicine  If symptoms worsen [address]  [address] [facility]  [phone]        Return to ED if worse     Diagnosis     Clinical Impression:   1. Dehydration    2. Fatigue, unspecified type        Attestations:    Vicenta Patel MD    Please note that this dictation was completed with [employer], the computer voice recognition software. Quite often unanticipated grammatical, syntax, homophones, and other interpretive errors are inadvertently transcribed by the computer software. Please disregard these errors. Please excuse any errors that have escaped final proofreading. Thank you.

## 2019-10-30 NOTE — ED TRIAGE NOTES
Weakness for one day, denies any pain or other associated symptoms, no distress noted, hx of copd, htn, hcl and dm, fsbs 140 in route per ems, vss, no distress noted.

## 2019-10-31 NOTE — DISCHARGE INSTRUCTIONS
Patient Education        Dehydration: Care Instructions  Your Care Instructions  Dehydration happens when your body loses too much fluid. This might happen when you do not drink enough water or you lose large amounts of fluids from your body because of diarrhea, vomiting, or sweating. Severe dehydration can be life-threatening. Water and minerals called electrolytes help put your body fluids back in balance. Learn the early signs of fluid loss, and drink more fluids to prevent dehydration. Follow-up care is a key part of your treatment and safety. Be sure to make and go to all appointments, and call your doctor if you are having problems. It's also a good idea to know your test results and keep a list of the medicines you take. How can you care for yourself at home? · To prevent dehydration, drink plenty of fluids, enough so that your urine is light yellow or clear like water. Choose water and other caffeine-free clear liquids until you feel better. If you have kidney, heart, or liver disease and have to limit fluids, talk with your doctor before you increase the amount of fluids you drink. · If you do not feel like eating or drinking, try taking small sips of water, sports drinks, or other rehydration drinks. · Get plenty of rest.  To prevent dehydration  · Add more fluids to your diet and daily routine, unless your doctor has told you not to. · During hot weather, drink more fluids. Drink even more fluids if you exercise a lot. Stay away from drinks with alcohol or caffeine. · Watch for the symptoms of dehydration. These include:  ? A dry, sticky mouth. ? Dark yellow urine, and not much of it. ? Dry and sunken eyes. ? Feeling very tired. · Learn what problems can lead to dehydration. These include:  ? Diarrhea, fever, and vomiting. ? Any illness with a fever, such as pneumonia or the flu. ?  Activities that cause heavy sweating, such as endurance races and heavy outdoor work in hot or humid weather. ? Alcohol or drug use or problems caused by quitting their use (withdrawal). ? Certain medicines, such as cold and allergy pills (antihistamines), diet pills (diuretics), and laxatives. ? Certain diseases, such as diabetes, cancer, and heart or kidney disease. When should you call for help? Call 911 anytime you think you may need emergency care. For example, call if:    · You passed out (lost consciousness).    Call your doctor now or seek immediate medical care if:    · You are confused and cannot think clearly.     · You are dizzy or lightheaded, or you feel like you may faint.     · You have signs of needing more fluids. You have sunken eyes and a dry mouth, and you pass only a little dark urine.     · You cannot keep fluids down.    Watch closely for changes in your health, and be sure to contact your doctor if:    · You are not making tears.     · Your skin is very dry and sags slowly back into place after you pinch it.     · Your mouth and eyes are very dry. Where can you learn more? Go to http://cassia-donte.info/. Enter E729 in the search box to learn more about \"Dehydration: Care Instructions. \"  Current as of: June 26, 2019  Content Version: 12.2  © 3501-3372 [employer]. Care instructions adapted under license by [employer] (which disclaims liability or warranty for this information). If you have questions about a medical condition or this instruction, always ask your healthcare professional. [place] [address] any warranty or liability for your use of this information. Patient Education        Fatigue: Care Instructions  Your Care Instructions    Fatigue is a feeling of tiredness, exhaustion, or lack of energy. You may feel fatigue because of too much or not enough activity. It can also come from stress, lack of sleep, boredom, and poor diet. Many medical problems, such as viral infections, can cause fatigue.  Emotional problems, especially depression, are often the cause of fatigue. Fatigue is most often a symptom of another problem. Treatment for fatigue depends on the cause. For example, if you have fatigue because you have a certain health problem, treating this problem also treats your fatigue. If depression or anxiety is the cause, treatment may help. Follow-up care is a key part of your treatment and safety. Be sure to make and go to all appointments, and call your doctor if you are having problems. It's also a good idea to know your test results and keep a list of the medicines you take. How can you care for yourself at home? · Get regular exercise. But don't overdo it. Go back and forth between rest and exercise. · Get plenty of rest.  · Eat a healthy diet. Do not skip meals, especially breakfast.  · Reduce your use of caffeine, tobacco, and alcohol. Caffeine is most often found in coffee, tea, cola drinks, and chocolate. · Limit medicines that can cause fatigue. This includes tranquilizers and cold and allergy medicines. When should you call for help? Watch closely for changes in your health, and be sure to contact your doctor if:    · You have new symptoms such as fever or a rash.     · Your fatigue gets worse.     · You have been feeling down, depressed, or hopeless. Or you may have lost interest in things that you usually enjoy.     · You are not getting better as expected. Where can you learn more? Go to http://cassia-donte.info/. Enter U876 in the search box to learn more about \"Fatigue: Care Instructions. \"  Current as of: June 26, 2019  Content Version: 12.2  © 9231-5105 [employer]. Care instructions adapted under license by [employer] (which disclaims liability or warranty for this information).  If you have questions about a medical condition or this instruction, always ask your healthcare professional. Emma Cutting disclaims any warranty or liability for your use of this information.

## 2019-10-31 NOTE — ED NOTES
[facility] to transport pt back home, care giver given update on pts discharge, [facility] eta 2330, no further needs.

## 2019-11-06 ENCOUNTER — APPOINTMENT (OUTPATIENT)
Dept: ULTRASOUND IMAGING | Age: 49
End: 2019-11-06
Attending: PHYSICIAN ASSISTANT
Payer: MEDICARE

## 2019-11-06 ENCOUNTER — HOSPITAL ENCOUNTER (EMERGENCY)
Age: 49
Discharge: HOME | End: 2019-11-06
Attending: EMERGENCY MEDICINE
Payer: MEDICARE

## 2019-11-06 VITALS
HEART RATE: 77 BPM | BODY MASS INDEX: 47.09 KG/M2 | WEIGHT: 293 LBS | RESPIRATION RATE: 18 BRPM | OXYGEN SATURATION: 96 % | HEIGHT: 66 IN | DIASTOLIC BLOOD PRESSURE: 72 MMHG | TEMPERATURE: 98 F | SYSTOLIC BLOOD PRESSURE: 114 MMHG

## 2019-11-06 DIAGNOSIS — L02.01 FACIAL ABSCESS: Primary | ICD-10-CM

## 2019-11-06 DIAGNOSIS — N93.8 DUB (DYSFUNCTIONAL UTERINE BLEEDING): ICD-10-CM

## 2019-11-06 LAB
APPEARANCE UR: ABNORMAL
BACTERIA URNS QL MICRO: ABNORMAL /HPF
BASOPHILS # BLD: 0 K/UL (ref 0–0.1)
BASOPHILS NFR BLD: 0 % (ref 0–1)
BILIRUB UR QL: NEGATIVE
COLOR UR: ABNORMAL
DIFFERENTIAL METHOD BLD: ABNORMAL
EOSINOPHIL # BLD: 0.1 K/UL (ref 0–0.4)
EOSINOPHIL NFR BLD: 1 % (ref 0–7)
EPITH CASTS URNS QL MICRO: ABNORMAL /LPF
ERYTHROCYTE [DISTWIDTH] IN BLOOD BY AUTOMATED COUNT: 13.1 % (ref 11.5–14.5)
GLUCOSE BLD STRIP.AUTO-MCNC: 93 MG/DL (ref 65–100)
GLUCOSE BLD STRIP.AUTO-MCNC: 99 MG/DL (ref 65–100)
GLUCOSE UR STRIP.AUTO-MCNC: NEGATIVE MG/DL
HCG UR QL: NEGATIVE
HCT VFR BLD AUTO: 45.1 % (ref 35–47)
HGB BLD-MCNC: 13 G/DL (ref 11.5–16)
HGB UR QL STRIP: ABNORMAL
IMM GRANULOCYTES # BLD AUTO: 0 K/UL (ref 0–0.04)
IMM GRANULOCYTES NFR BLD AUTO: 0 % (ref 0–0.5)
KETONES UR QL STRIP.AUTO: NEGATIVE MG/DL
LEUKOCYTE ESTERASE UR QL STRIP.AUTO: ABNORMAL
LYMPHOCYTES # BLD: 0.9 K/UL (ref 0.8–3.5)
LYMPHOCYTES NFR BLD: 9 % (ref 12–49)
MCH RBC QN AUTO: 27.5 PG (ref 26–34)
MCHC RBC AUTO-ENTMCNC: 28.8 G/DL (ref 30–36.5)
MCV RBC AUTO: 95.3 FL (ref 80–99)
MONOCYTES # BLD: 0.6 K/UL (ref 0–1)
MONOCYTES NFR BLD: 6 % (ref 5–13)
NEUTS SEG # BLD: 8.7 K/UL (ref 1.8–8)
NEUTS SEG NFR BLD: 84 % (ref 32–75)
NITRITE UR QL STRIP.AUTO: NEGATIVE
NRBC # BLD: 0 K/UL (ref 0–0.01)
NRBC BLD-RTO: 0 PER 100 WBC
PH UR STRIP: 8 [PH] (ref 5–8)
PLATELET # BLD AUTO: 172 K/UL (ref 150–400)
PMV BLD AUTO: 11.2 FL (ref 8.9–12.9)
PROT UR STRIP-MCNC: 30 MG/DL
RBC # BLD AUTO: 4.73 M/UL (ref 3.8–5.2)
RBC #/AREA URNS HPF: ABNORMAL /HPF (ref 0–5)
RBC MORPH BLD: ABNORMAL
SERVICE CMNT-IMP: NORMAL
SERVICE CMNT-IMP: NORMAL
SP GR UR REFRACTOMETRY: 1.02 (ref 1–1.03)
UA: UC IF INDICATED,UAUC: ABNORMAL
UROBILINOGEN UR QL STRIP.AUTO: 2 EU/DL (ref 0.2–1)
WBC # BLD AUTO: 10.3 K/UL (ref 3.6–11)
WBC URNS QL MICRO: ABNORMAL /HPF (ref 0–4)

## 2019-11-06 PROCEDURE — 77030029684 HC NEB SM VOL KT MONA -A

## 2019-11-06 PROCEDURE — 87077 CULTURE AEROBIC IDENTIFY: CPT

## 2019-11-06 PROCEDURE — 82962 GLUCOSE BLOOD TEST: CPT

## 2019-11-06 PROCEDURE — 81001 URINALYSIS AUTO W/SCOPE: CPT

## 2019-11-06 PROCEDURE — 36415 COLL VENOUS BLD VENIPUNCTURE: CPT

## 2019-11-06 PROCEDURE — 94640 AIRWAY INHALATION TREATMENT: CPT

## 2019-11-06 PROCEDURE — 85025 COMPLETE CBC W/AUTO DIFF WBC: CPT

## 2019-11-06 PROCEDURE — 99285 EMERGENCY DEPT VISIT HI MDM: CPT

## 2019-11-06 PROCEDURE — 87186 SC STD MICRODIL/AGAR DIL: CPT

## 2019-11-06 PROCEDURE — 87086 URINE CULTURE/COLONY COUNT: CPT

## 2019-11-06 PROCEDURE — 76830 TRANSVAGINAL US NON-OB: CPT

## 2019-11-06 PROCEDURE — 74011000250 HC RX REV CODE- 250: Performed by: PHYSICIAN ASSISTANT

## 2019-11-06 PROCEDURE — 81025 URINE PREGNANCY TEST: CPT

## 2019-11-06 RX ORDER — ALBUTEROL SULFATE 2.5 MG/.5ML
2.5 SOLUTION RESPIRATORY (INHALATION)
Status: COMPLETED | OUTPATIENT
Start: 2019-11-06 | End: 2019-11-06

## 2019-11-06 RX ORDER — IPRATROPIUM BROMIDE AND ALBUTEROL SULFATE 2.5; .5 MG/3ML; MG/3ML
3 SOLUTION RESPIRATORY (INHALATION)
Status: COMPLETED | OUTPATIENT
Start: 2019-11-06 | End: 2019-11-06

## 2019-11-06 RX ADMIN — ALBUTEROL SULFATE 2.5 MG: 2.5 SOLUTION RESPIRATORY (INHALATION) at 20:43

## 2019-11-06 RX ADMIN — IPRATROPIUM BROMIDE AND ALBUTEROL SULFATE 3 ML: .5; 3 SOLUTION RESPIRATORY (INHALATION) at 17:04

## 2019-11-06 NOTE — ED NOTES
Pt. Back from ultrasound at this time. Pt. Placed back in recliner per request.  Pt. Provided with ice and PO per SAGE Beal.

## 2019-11-06 NOTE — ED NOTES
Pt. Awaiting transportation home at this time. Patient stating \"my chest is burning. \"  Spoke with SAGE Aj. No new orders received. Will monitor closely.

## 2019-11-06 NOTE — PROGRESS NOTES
CM asked to assist nursing with setting up stretcher transportation to pts home residence. CM verified address and phone number with pt at bedside. [address] contacted [address] [place] [address] for confirmation approval for transportation. 1815 CM still holding for [facility] Medicaid. No follow up by provider at this time, cm remains on hold    1820  CM sent request to [facility] via allscriRACTIV, CM also contacted [facility] directly. Transportation with bariatric team available for 8pm  per [facility] representative. Per Representative no further information needed. [place] remains on hold with [address] [place] [address] regarding transportation assistance. CM sent e-mail to [facility] leaders updating of situation. Attempted call to [address] [place] [address] service via second line, per message no one available to assist at this time. CM disconnected phone. ED nurse, charge nurse aware of transportation time, in agreement with time of . CM to remain available for support as needed    Cheryle Rilee.  RN, BSN  [facility]  [phone]

## 2019-11-06 NOTE — ED NOTES
Pt. Presents to ED for complaints of LUE intermittent tingling x3 days. Pt. Also states that she has vaginal bleeding. Pt. Alert and oriented x4. Pt. Requesting to be placed in a recliner at this time for comfort.

## 2019-11-06 NOTE — ED NOTES
Pt. Is a difficult stick. Pt. Requesting ultrasound guided IV. Staff to attempt IV insertion via ultrasound.

## 2019-11-06 NOTE — ED PROVIDER NOTES
EMERGENCY DEPARTMENT HISTORY AND PHYSICAL EXAM      Date: 11/6/2019  Patient Name: Carlie Johns    History of Presenting Illness     Chief Complaint   Patient presents with    Arm Pain     L arm pain for the past 3 days; intermittent numbness and tingling. History Provided By: Patient    HPI: Carlie Johns, 54 y.o. female with PMHx significant for cardiovascular disorders, obesity, cardiac history. Patient states that she has been having an abscess noted to the left side of her face that has reduced in size after a at home specialist had drained it however she still feels slight cyst on her face and states it was previously interfering with her ability to wear her BiPAP machine however that has gone down. She has already been prescribed antibiotics for that denies any facial swelling, trismus, drainage at this time. She also states that her main concern is the vaginal bleeding. She states she is unsure of where it is coming from however has noted it for the past several days. She does state that she takes Depakote injections her last injection was in February of this year. She denies any burning urination, vaginal discharge, incontinence at this time or any flank pain. She does state that she has minor suprapubic discomfort as well. Please note for examination and HPI were completed I did introduce myself as the physician assistant. Please note that this dictation was completed with [email or web address], the computer voice recognition software. Quite often unanticipated grammatical, syntax, homophones, and other interpretive errors are inadvertently transcribed by the computer software. Please disregard these errors. Please excuse any errors that have escaped final proofreading. For further clarification on any chart please contact myself. Thank you. There are no other complaints, changes, or physical findings at this time.     PCP: Gerber Kaur MD    No current facility-administered medications on file prior to encounter. Current Outpatient Medications on File Prior to Encounter   Medication Sig Dispense Refill    nystatin (MYCOSTATIN) topical cream Apply  to affected area two (2) times a day. 15 g 0    alum-mag hydroxide-simeth (MAALOX ADVANCED) 200-200-20 mg/5 mL susp Take 30 mL by mouth every four (4) hours as needed for Pain. 354 mL 0    ranolazine ER (RANEXA) 500 mg SR tablet Take 1 Tab by mouth two (2) times a day. 60 Tab 8    cyclobenzaprine (FLEXERIL) 5 mg tablet Take 1 Tab by mouth two (2) times daily as needed for Muscle Spasm(s). flexeriol 5 Tab 0    albuterol-ipratropium (DUO-NEB) 2.5 mg-0.5 mg/3 ml nebu 3 mL by Nebulization route four (4) times daily. 100 Nebule 1    furosemide (LASIX) 40 mg tablet Take 40 mg by mouth two (2) times a day.  fluticasone (FLONASE) 50 mcg/actuation nasal spray 2 Sprays by Both Nostrils route daily. 1 Bottle 0    insulin glargine (LANTUS) 100 unit/mL injection 45 Units by SubCUTAneous route daily.  metoprolol tartrate (LOPRESSOR) 25 mg tablet Take 25 mg by mouth two (2) times a day.  fluticasone-salmeterol (ADVAIR DISKUS) 500-50 mcg/dose diskus inhaler Take 1 Puff by inhalation every twelve (12) hours.  loratadine (CLARITIN) 10 mg tablet Take 10 mg by mouth daily.  albuterol (VENTOLIN HFA) 90 mcg/actuation inhaler Take 2 Puffs by inhalation every six (6) hours as needed for Wheezing.  aspirin 81 mg chewable tablet Take 81 mg by mouth daily.  hydrOXYzine pamoate (VISTARIL) 50 mg capsule Take 50 mg by mouth every eight (8) hours as needed for Itching.  lovastatin (MEVACOR) 40 mg tablet Take 1 Tab by mouth nightly. 30 Tab 6    glyBURIDE (DIABETA) 5 mg tablet Take 5 mg by mouth two (2) times daily (with meals).       ammonium lactate (LAC-HYDRIN) 12 % topical cream rub in to affected area well 280 g 1    nitroglycerin (NITROSTAT) 0.4 mg SL tablet 1 Tab by SubLINGual route every five (5) minutes as needed for Chest Pain (call 911 if not relieved by 3). 25 Tab 2       Past History     Past Medical History:  Past Medical History:   Diagnosis Date    Arthritis     Asthma     Breast lump     CAD (coronary artery disease)     Congestive heart failure (HCC)     COPD (chronic obstructive pulmonary disease) (HCC)     Diabetes [facility]     Hypertension     Morbid obesity with BMI of 70 and over, adult ([facility]     [facility]ST elevated [facility]     SVT (supraventricular tachycardia) ([facility]        Past Surgical History:  Past Surgical History:   Procedure Laterality Date    CARDIAC SURG PROCEDURE UNLIST      Stents    HX  SECTION      HX ORTHOPAEDIC      HX OTHER SURGICAL      cyst removed from back       Family History:  Family History   Problem Relation Age of Onset    Heart Disease Mother     Heart Disease Father     Heart Disease Sister     Breast Cancer Maternal Grandmother     Breast Cancer Paternal Grandmother        Social History:  Social History     Tobacco Use    Smoking status: Former Smoker     Packs/day: 0.25     Types: Cigarettes    Smokeless tobacco: Never Used    Tobacco comment: Patient states \"I  aint smoking no more d*mn cigarettes after yesterday\" 2018   Substance Use Topics    Alcohol use: No    Drug use: No       Allergies:  No Known Allergies      Review of Systems   Review of Systems   All other systems reviewed and are negative. Physical Exam   Physical Exam   Constitutional: She is oriented to person, place, and time. She appears well-developed and well-nourished. HENT:   Head: Normocephalic and atraumatic. Mouth/Throat: Oropharynx is clear and moist.   Eyes: Pupils are equal, round, and reactive to light. Conjunctivae and EOM are normal.   Neck: Normal range of motion. Neck supple. No thyromegaly present. Cardiovascular: Normal rate, regular rhythm, normal heart sounds and intact distal pulses. No murmur heard.   Pulmonary/Chest: Effort normal and breath sounds normal. No stridor. No respiratory distress. She has no wheezes. Abdominal: Soft. Bowel sounds are normal. There is no tenderness. Genitourinary:   Genitourinary Comments: Normal external vaginal genitalia appreciated. No external lesions noted. Soft vaginal vault there is blood appreciated. Musculoskeletal: Normal range of motion. She exhibits no edema or tenderness. Lymphadenopathy:     She has no cervical adenopathy. Neurological: She is alert and oriented to person, place, and time. Skin: Skin is warm. Psychiatric: She has a normal mood and affect.  Judgment normal.       Diagnostic Study Results     Labs -     Recent Results (from the past 12 hour(s))   GLUCOSE, POC    Collection Time: 11/06/19 12:12 PM   Result Value Ref Range    Glucose (POC) 99 65 - 100 mg/dL    Performed by Miguel Menon    URINALYSIS W/ REFLEX CULTURE    Collection Time: 11/06/19  1:35 PM   Result Value Ref Range    Color DARK YELLOW      Appearance CLOUDY (A) CLEAR      Specific gravity 1.019 1.003 - 1.030      pH (UA) 8.0 5.0 - 8.0      Protein 30 (A) NEG mg/dL    Glucose NEGATIVE  NEG mg/dL    Ketone NEGATIVE  NEG mg/dL    Bilirubin NEGATIVE  NEG      Blood LARGE (A) NEG      Urobilinogen 2.0 (H) 0.2 - 1.0 EU/dL    Nitrites NEGATIVE  NEG      Leukocyte Esterase TRACE (A) NEG      WBC 5-10 0 - 4 /hpf    RBC 20-50 0 - 5 /hpf    Epithelial cells MODERATE (A) FEW /lpf    Bacteria 1+ (A) NEG /hpf    UA:UC IF INDICATED URINE CULTURE ORDERED (A) CNI     HCG URINE, QL. - POC    Collection Time: 11/06/19  1:37 PM   Result Value Ref Range    Pregnancy test,urine (POC) NEGATIVE  NEG     CBC WITH AUTOMATED DIFF    Collection Time: 11/06/19  2:13 PM   Result Value Ref Range    WBC 10.3 3.6 - 11.0 K/uL    RBC 4.73 3.80 - 5.20 M/uL    HGB 13.0 11.5 - 16.0 g/dL    HCT 45.1 35.0 - 47.0 %    MCV 95.3 80.0 - 99.0 FL    MCH 27.5 26.0 - 34.0 PG    MCHC 28.8 (L) 30.0 - 36.5 g/dL    RDW 13.1 11.5 - 14.5 % PLATELET 028 060 - 189 K/uL    MPV 11.2 8.9 - 12.9 FL    NRBC 0.0 0  WBC    ABSOLUTE NRBC 0.00 0.00 - 0.01 K/uL    NEUTROPHILS 84 (H) 32 - 75 %    LYMPHOCYTES 9 (L) 12 - 49 %    MONOCYTES 6 5 - 13 %    EOSINOPHILS 1 0 - 7 %    BASOPHILS 0 0 - 1 %    IMMATURE GRANULOCYTES 0 0.0 - 0.5 %    ABS. NEUTROPHILS 8.7 (H) 1.8 - 8.0 K/UL    ABS. LYMPHOCYTES 0.9 0.8 - 3.5 K/UL    ABS. MONOCYTES 0.6 0.0 - 1.0 K/UL    ABS. EOSINOPHILS 0.1 0.0 - 0.4 K/UL    ABS. BASOPHILS 0.0 0.0 - 0.1 K/UL    ABS. IMM. GRANS. 0.0 0.00 - 0.04 K/UL    DF AUTOMATED      RBC COMMENTS HYPOCHROMIA  PRESENT       GLUCOSE, POC    Collection Time: 11/06/19  2:18 PM   Result Value Ref Range    Glucose (POC) 93 65 - 100 mg/dL    Performed by Michael Funez        Radiologic Studies -   US TRANSVAGINAL   Final Result   IMPRESSION:  Nondiagnostic ultrasound. CT Results  (Last 48 hours)    None        CXR Results  (Last 48 hours)    None            Medical Decision Making   I am the first provider for this patient. I reviewed the vital signs, available nursing notes, past medical history, past surgical history, family history and social history. Vital Signs-Reviewed the patient's vital signs. Patient Vitals for the past 12 hrs:   Temp Pulse Resp BP SpO2   11/06/19 1705     99 %   11/06/19 1600  76  114/70    11/06/19 1400  81  110/70    11/06/19 1142 98 °F (36.7 °C) 82 18 113/71 97 %       Records Reviewed: Nursing Notes    Provider Notes (Medical Decision Making):       ED Course:   Progress note at 1400: Patient is currently awaiting ultrasound she is having vaginal bleeding slight pelvic discomfort noted as well. She is not currently sexually active and there is no signs of discharge therefore swabs were not sent. At this time we will attempt to rule out any ovarian causes patient's vaginal bleeding. Initial assessment performed.  The patients presenting problems have been discussed, and they are in agreement with the care plan formulated and outlined with them. I have encouraged them to ask questions as they arise throughout their visit. Critical Care Time: None    Disposition:      PLAN:  1. Current Discharge Medication List        2. Follow-up Information     Follow up With Specialties Details Why Contact Info    Kristi Page, [address] [facility] [address] [facility] [address]  [address] [place] [address]  [phone]        Return to ED if worse     Diagnosis     Clinical Impression:   1. Facial abscess    2. DUB (dysfunctional uterine bleeding)          Please note that this dictation was completed with [employer], the computer voice recognition software. Quite often unanticipated grammatical, syntax, homophones, and other interpretive errors are inadvertently transcribed by the computer software. Please disregards these errors. Please excuse any errors that have escaped final proofreading. This note will not be viewable in [address]

## 2019-11-06 NOTE — DISCHARGE INSTRUCTIONS
Patient Education        Abnormal Uterine Bleeding: Care Instructions  Your Care Instructions    Abnormal uterine bleeding (AUB) is irregular bleeding from the uterus that is longer or heavier than usual or does not occur at your regular time. Sometimes it is caused by changes in hormone levels. It can also be caused by growths in the uterus, such as fibroids or polyps. Sometimes a cause cannot be found. You may have heavy bleeding when you are not expecting your period. Your doctor may suggest a pregnancy test, if you think you are pregnant. Follow-up care is a key part of your treatment and safety. Be sure to make and go to all appointments, and call your doctor if you are having problems. It's also a good idea to know your test results and keep a list of the medicines you take. How can you care for yourself at home? · Be safe with medicines. Take pain medicines exactly as directed. ? If the doctor gave you a prescription medicine for pain, take it as prescribed. ? If you are not taking a prescription pain medicine, ask your doctor if you can take an over-the-counter medicine. · You may be low in iron because of blood loss. Eat a balanced diet that is high in iron and vitamin C. Foods rich in iron include red meat, shellfish, eggs, beans, and leafy green vegetables. Talk to your doctor about whether you need to take iron pills or a multivitamin. When should you call for help? Call 911 anytime you think you may need emergency care. For example, call if:    · You passed out (lost consciousness).    Call your doctor now or seek immediate medical care if:    · You have new or worse belly or pelvic pain.     · You have severe vaginal bleeding.     · You feel dizzy or lightheaded, or you feel like you may faint.    Watch closely for changes in your health, and be sure to contact your doctor if:    · You think you may be pregnant.     · Your bleeding gets worse.     · You do not get better as expected.    Where can you learn more? Go to http://cassia-donte.info/. Enter D064 in the search box to learn more about \"Abnormal Uterine Bleeding: Care Instructions. \"  Current as of: February 19, 2019  Content Version: 12.2  © 9886-0111 [employer]. Care instructions adapted under license by [employer] (which disclaims liability or warranty for this information). If you have questions about a medical condition or this instruction, always ask your healthcare professional. [place] [address] any warranty or liability for your use of this information.

## 2019-11-06 NOTE — ED NOTES
Pt. States she is unable to have her pelvic exam completed on a stretcher. Ordering hospital bed for patient. Pt. Also requesting ABG be completed at this time. PA aware who will come speak with patient.

## 2019-11-07 NOTE — ED NOTES
Bedside and Verbal shift change report given to [id number] and Alissa Knox RN (oncoming nurse) by Belkis Rosario RN (offgoing nurse). Report included the following information SBAR, ED Summary, MAR and Recent Results.

## 2019-11-07 NOTE — ED NOTES
Pt discharged by Iris Sheffield. Pt provided with discharge instructions Rx and instructions on follow up care. [facility].

## 2019-11-08 ENCOUNTER — HOSPITAL ENCOUNTER (EMERGENCY)
Age: 49
Discharge: HOME | End: 2019-11-08
Attending: EMERGENCY MEDICINE
Payer: MEDICARE

## 2019-11-08 ENCOUNTER — APPOINTMENT (OUTPATIENT)
Dept: GENERAL RADIOLOGY | Age: 49
End: 2019-11-08
Attending: EMERGENCY MEDICINE
Payer: MEDICARE

## 2019-11-08 VITALS
OXYGEN SATURATION: 91 % | HEART RATE: 98 BPM | WEIGHT: 293 LBS | SYSTOLIC BLOOD PRESSURE: 132 MMHG | DIASTOLIC BLOOD PRESSURE: 89 MMHG | RESPIRATION RATE: 16 BRPM | TEMPERATURE: 97.7 F | HEIGHT: 66 IN | BODY MASS INDEX: 47.09 KG/M2

## 2019-11-08 DIAGNOSIS — R06.02 SOB (SHORTNESS OF BREATH): Primary | ICD-10-CM

## 2019-11-08 LAB
ANION GAP SERPL CALC-SCNC: 4 MMOL/L (ref 5–15)
ARTERIAL PATENCY WRIST A: YES
BACTERIA SPEC CULT: ABNORMAL
BASE EXCESS BLD CALC-SCNC: 6 MMOL/L
BDY SITE: ABNORMAL
BUN SERPL-MCNC: 18 MG/DL (ref 6–20)
BUN/CREAT SERPL: 22 (ref 12–20)
CALCIUM SERPL-MCNC: 9.9 MG/DL (ref 8.5–10.1)
CC UR VC: ABNORMAL
CHLORIDE SERPL-SCNC: 104 MMOL/L (ref 97–108)
CO2 SERPL-SCNC: 32 MMOL/L (ref 21–32)
CREAT SERPL-MCNC: 0.81 MG/DL (ref 0.55–1.02)
ERYTHROCYTE [DISTWIDTH] IN BLOOD BY AUTOMATED COUNT: 13.2 % (ref 11.5–14.5)
GAS FLOW.O2 O2 DELIVERY SYS: ABNORMAL L/MIN
GAS FLOW.O2 SETTING OXYMISER: 3 L/M
GLUCOSE SERPL-MCNC: 92 MG/DL (ref 65–100)
HCO3 BLD-SCNC: 31 MMOL/L (ref 22–26)
HCT VFR BLD AUTO: 43.6 % (ref 35–47)
HGB BLD-MCNC: 12.8 G/DL (ref 11.5–16)
MCH RBC QN AUTO: 27.3 PG (ref 26–34)
MCHC RBC AUTO-ENTMCNC: 29.4 G/DL (ref 30–36.5)
MCV RBC AUTO: 93 FL (ref 80–99)
NRBC # BLD: 0 K/UL (ref 0–0.01)
NRBC BLD-RTO: 0 PER 100 WBC
O2/TOTAL GAS SETTING VFR VENT: 32 %
PCO2 BLD: 54.4 MMHG (ref 35–45)
PH BLD: 7.36 [PH] (ref 7.35–7.45)
PLATELET # BLD AUTO: 184 K/UL (ref 150–400)
PMV BLD AUTO: 11.5 FL (ref 8.9–12.9)
PO2 BLD: 76 MMHG (ref 80–100)
POTASSIUM SERPL-SCNC: 3.7 MMOL/L (ref 3.5–5.1)
RBC # BLD AUTO: 4.69 M/UL (ref 3.8–5.2)
SAO2 % BLD: 94 % (ref 92–97)
SERVICE CMNT-IMP: ABNORMAL
SODIUM SERPL-SCNC: 140 MMOL/L (ref 136–145)
SPECIMEN TYPE: ABNORMAL
TOTAL RESP. RATE, ITRR: 20
WBC # BLD AUTO: 10.8 K/UL (ref 3.6–11)

## 2019-11-08 PROCEDURE — 77010033678 HC OXYGEN DAILY

## 2019-11-08 PROCEDURE — 71045 X-RAY EXAM CHEST 1 VIEW: CPT

## 2019-11-08 PROCEDURE — 82803 BLOOD GASES ANY COMBINATION: CPT

## 2019-11-08 PROCEDURE — 74011250636 HC RX REV CODE- 250/636: Performed by: EMERGENCY MEDICINE

## 2019-11-08 PROCEDURE — 36600 WITHDRAWAL OF ARTERIAL BLOOD: CPT

## 2019-11-08 PROCEDURE — 80048 BASIC METABOLIC PNL TOTAL CA: CPT

## 2019-11-08 PROCEDURE — 36415 COLL VENOUS BLD VENIPUNCTURE: CPT

## 2019-11-08 PROCEDURE — 96374 THER/PROPH/DIAG INJ IV PUSH: CPT

## 2019-11-08 PROCEDURE — 94640 AIRWAY INHALATION TREATMENT: CPT

## 2019-11-08 PROCEDURE — 85027 COMPLETE CBC AUTOMATED: CPT

## 2019-11-08 PROCEDURE — 74011000250 HC RX REV CODE- 250: Performed by: EMERGENCY MEDICINE

## 2019-11-08 PROCEDURE — 99284 EMERGENCY DEPT VISIT MOD MDM: CPT

## 2019-11-08 RX ORDER — IPRATROPIUM BROMIDE AND ALBUTEROL SULFATE 2.5; .5 MG/3ML; MG/3ML
3 SOLUTION RESPIRATORY (INHALATION)
Status: COMPLETED | OUTPATIENT
Start: 2019-11-08 | End: 2019-11-08

## 2019-11-08 RX ADMIN — IPRATROPIUM BROMIDE AND ALBUTEROL SULFATE 3 ML: .5; 3 SOLUTION RESPIRATORY (INHALATION) at 14:49

## 2019-11-08 RX ADMIN — METHYLPREDNISOLONE SODIUM SUCCINATE 125 MG: 125 INJECTION, POWDER, FOR SOLUTION INTRAMUSCULAR; INTRAVENOUS at 14:49

## 2019-11-08 NOTE — ED TRIAGE NOTES
Pt arrives from home via [facility], c/o increasing sob x 1 week. Given 1 duoneb tx by [facility] en route, denies improvement in respirations.  On 3lpm nc which is her baseline

## 2019-11-08 NOTE — ED PROVIDER NOTES
EMERGENCY DEPARTMENT HISTORY AND PHYSICAL EXAM      Date: 11/8/2019  Patient Name: Twyla Castanon  Patient Age and Sex: 54 y.o. female     History of Presenting Illness     No chief complaint on file. History Provided By: Patient    HPI: Twyla Castanon is a 51-year-old female with a history of asthma, COPD, noncompliance with her BiPAP, diabetes, morbid obesity, presenting for shortness of breath and intermittent confusion. Patient states that over the past week has been having worsening shortness of breath and wheezing. States that despite albuterol treatment still does not feel any better. Also states that she has had intermittent times when she forgets things. Had dispatch health come to see her and they had ordered a chest x-ray and nebulizers but it did not feel better. Stated that she might need some steroids. Patient was seen recently because of a facial abscess on her left cheek and because of that has not been using her BiPAP. Patient states that for the past 2 weeks or so she has not been using the BiPAP. Denies any chest pain, nausea, vomiting but has been having a slight cough. There are no other complaints, changes, or physical findings at this time. PCP: Ana Lilia George MD    No current facility-administered medications on file prior to encounter. Current Outpatient Medications on File Prior to Encounter   Medication Sig Dispense Refill    nystatin (MYCOSTATIN) topical cream Apply  to affected area two (2) times a day. 15 g 0    alum-mag hydroxide-simeth (MAALOX ADVANCED) 200-200-20 mg/5 mL susp Take 30 mL by mouth every four (4) hours as needed for Pain. 354 mL 0    ranolazine ER (RANEXA) 500 mg SR tablet Take 1 Tab by mouth two (2) times a day. 60 Tab 8    cyclobenzaprine (FLEXERIL) 5 mg tablet Take 1 Tab by mouth two (2) times daily as needed for Muscle Spasm(s).  flexeriol 5 Tab 0    albuterol-ipratropium (DUO-NEB) 2.5 mg-0.5 mg/3 ml nebu 3 mL by Nebulization route four (4) times daily. 100 Nebule 1    furosemide (LASIX) 40 mg tablet Take 40 mg by mouth two (2) times a day.  fluticasone (FLONASE) 50 mcg/actuation nasal spray 2 Sprays by Both Nostrils route daily. 1 Bottle 0    insulin glargine (LANTUS) 100 unit/mL injection 45 Units by SubCUTAneous route daily.  metoprolol tartrate (LOPRESSOR) 25 mg tablet Take 25 mg by mouth two (2) times a day.  fluticasone-salmeterol (ADVAIR DISKUS) 500-50 mcg/dose diskus inhaler Take 1 Puff by inhalation every twelve (12) hours.  loratadine (CLARITIN) 10 mg tablet Take 10 mg by mouth daily.  albuterol (VENTOLIN HFA) 90 mcg/actuation inhaler Take 2 Puffs by inhalation every six (6) hours as needed for Wheezing.  aspirin 81 mg chewable tablet Take 81 mg by mouth daily.  hydrOXYzine pamoate (VISTARIL) 50 mg capsule Take 50 mg by mouth every eight (8) hours as needed for Itching.  lovastatin (MEVACOR) 40 mg tablet Take 1 Tab by mouth nightly. 30 Tab 6    glyBURIDE (DIABETA) 5 mg tablet Take 5 mg by mouth two (2) times daily (with meals).  ammonium lactate (LAC-HYDRIN) 12 % topical cream rub in to affected area well 280 g 1    nitroglycerin (NITROSTAT) 0.4 mg SL tablet 1 Tab by SubLINGual route every five (5) minutes as needed for Chest Pain (call 911 if not relieved by 3).  25 Tab 2       Past History     Past Medical History:  Past Medical History:   Diagnosis Date    Arthritis     Asthma     Breast lump     CAD (coronary artery disease)     Cancer (HCC)     breast cancer    Congestive heart failure (HCC)     COPD (chronic obstructive pulmonary disease) (HCC)     Diabetes [facility] [address]     Hypertension     Morbid obesity with BMI of 70 and over, adult ([facility] [facility]     NSTEMI (non-ST elevated myocardial infarction) ([facility] [facility]     SVT (supraventricular tachycardia) ([facility] [address]        Past Surgical History:  Past Surgical History:   Procedure Laterality Date    CARDIAC SURG PROCEDURE UNLIST      Stents  HX  SECTION      HX ORTHOPAEDIC      HX OTHER SURGICAL      cyst removed from back       Family History:  Family History   Problem Relation Age of Onset    Heart Disease Mother     Heart Disease Father     Heart Disease Sister     Breast Cancer Maternal Grandmother     Breast Cancer Paternal Grandmother        Social History:  Social History     Tobacco Use    Smoking status: Former Smoker     Packs/day: 0.25     Types: Cigarettes    Smokeless tobacco: Never Used    Tobacco comment: Patient states \"I  aint smoking no more d*mn cigarettes after yesterday\" 2018   Substance Use Topics    Alcohol use: No    Drug use: No       Allergies:  No Known Allergies      Review of Systems   Review of Systems   Constitutional: Negative for chills and fever. Respiratory: Positive for cough, shortness of breath and wheezing. Cardiovascular: Negative for chest pain and leg swelling. Gastrointestinal: Negative for abdominal pain, constipation, diarrhea, nausea and vomiting. Neurological: Negative for weakness and numbness. Psychiatric/Behavioral: Positive for confusion. All other systems reviewed and are negative. Physical Exam   Physical Exam   Constitutional: She is oriented to person, place, and time. She appears well-developed and well-nourished. Morbidly obese female   HENT:   Head: Normocephalic and atraumatic. Eyes: Conjunctivae and EOM are normal.   Neck: Normal range of motion. Neck supple. Cardiovascular: Normal rate and regular rhythm. Pulmonary/Chest: Effort normal. No respiratory distress. She has wheezes. Minimal wheezes bilateral, patient is on 5 L nasal cannula at baseline but on 3 L is saturating 93%. She is speaking in full sentences and giving me a long story as to what brings her in. Abdominal: Soft. She exhibits no distension. There is no tenderness. Musculoskeletal: Normal range of motion.    Neurological: She is alert and oriented to person, place, and time. Skin: Skin is warm and dry. Psychiatric: She has a normal mood and affect. Nursing note and vitals reviewed. Diagnostic Study Results     Labs -     Recent Results (from the past 12 hour(s))   CBC W/O DIFF    Collection Time: 11/08/19  2:49 PM   Result Value Ref Range    WBC 10.8 3.6 - 11.0 K/uL    RBC 4.69 3.80 - 5.20 M/uL    HGB 12.8 11.5 - 16.0 g/dL    HCT 43.6 35.0 - 47.0 %    MCV 93.0 80.0 - 99.0 FL    MCH 27.3 26.0 - 34.0 PG    MCHC 29.4 (L) 30.0 - 36.5 g/dL    RDW 13.2 11.5 - 14.5 %    PLATELET 426 531 - 671 K/uL    MPV 11.5 8.9 - 12.9 FL    NRBC 0.0 0  WBC    ABSOLUTE NRBC 0.00 0.00 - 8.02 K/uL   METABOLIC PANEL, BASIC    Collection Time: 11/08/19  2:49 PM   Result Value Ref Range    Sodium 140 136 - 145 mmol/L    Potassium 3.7 3.5 - 5.1 mmol/L    Chloride 104 97 - 108 mmol/L    CO2 32 21 - 32 mmol/L    Anion gap 4 (L) 5 - 15 mmol/L    Glucose 92 65 - 100 mg/dL    BUN 18 6 - 20 MG/DL    Creatinine 0.81 0.55 - 1.02 MG/DL    BUN/Creatinine ratio 22 (H) 12 - 20      GFR est AA >60 >60 ml/min/1.73m2    GFR est non-AA >60 >60 ml/min/1.73m2    Calcium 9.9 8.5 - 10.1 MG/DL   POC G3 - PUL    Collection Time: 11/08/19  2:58 PM   Result Value Ref Range    FIO2 (POC) 32 %    pH (POC) 7.364 7.35 - 7.45      pCO2 (POC) 54.4 (H) 35.0 - 45.0 MMHG    pO2 (POC) 76 (L) 80 - 100 MMHG    HCO3 (POC) 31.0 (H) 22 - 26 MMOL/L    sO2 (POC) 94 92 - 97 %    Base excess (POC) 6 mmol/L    Site RIGHT RADIAL      Device: NASAL CANNULA      Flow rate (POC) 3 L/M    Allens test (POC) YES      Specimen type (POC) ARTERIAL      Total resp. rate 20         Radiologic Studies -   XR CHEST PORT   Final Result   IMPRESSION: Limited study. Questionable patchy airspace opacity in the right   lung base. This may be artifactual related to technical factors. Pneumonia is   not excluded.  Dedicated PA and lateral radiographs of the chest are recommended   for further assessment        CT Results  (Last 48 hours)    None CXR Results  (Last 48 hours)               11/08/19 1511  XR CHEST PORT Final result    Impression:  IMPRESSION: Limited study. Questionable patchy airspace opacity in the right   lung base. This may be artifactual related to technical factors. Pneumonia is   not excluded. Dedicated PA and lateral radiographs of the chest are recommended   for further assessment       Narrative:  EXAM: XR CHEST PORT       INDICATION: sob       COMPARISON: 10/30/2019       FINDINGS: A portable AP radiograph of the chest was obtained at 1447 hours. The   study is limited by portable technique and patient body habitus. . Questionable   patchy airspace opacity in the right lung base. Elianibal Nicely Heart size is enlarged. .  The   bones and soft tissues are grossly within normal limits. Medical Decision Making   I am the first provider for this patient. I reviewed the vital signs, available nursing notes, past medical history, past surgical history, family history and social history. Vital Signs-Reviewed the patient's vital signs. Patient Vitals for the past 12 hrs:   Temp Pulse Resp BP SpO2   11/08/19 1449     95 %   11/08/19 1409 97.7 °F (36.5 °C) 98 16 143/88 95 %       Records Reviewed: Nursing Notes and Old Medical Records    Provider Notes (Medical Decision Making):   Patient presenting with shortness of breath and intermittent confusion. Most likely due to noncompliance with her BiPAP and possible hypercapnia. Differential includes asthma, COPD, acute bronchitis, pneumonia. Will get chest x-ray and give her treatment such as IV steroids. We will also get ABG. Patient has been admitted several times for hypercapnic failure. ED Course:   Initial assessment performed. The patients presenting problems have been discussed, and they are in agreement with the care plan formulated and outlined with them. I have encouraged them to ask questions as they arise throughout their visit.     ED Course as of Nov 08 1555 Fri Nov 08, 2019   1535 ABG on patient looks great. Better than it has in the past according to RT and confirmed by me. Turns out she does not require the 5 L that she normally is on and respiratory therapy talk to her that may be she is on too much oxygen causing her symptoms. Thus patient will try to do 3 L instead at home. Was seen to her lungs and her poor inspiratory effort, I have a strong feeling this is not a pneumonia based on chest x-ray read and probably more poor inspiratory effort. [JS]      ED Course User Index  [JS] Paula Gr MD     Critical Care Time:   0    Disposition:  Discharge Note:  The patient has been re-evaluated and is ready for discharge. Reviewed available results with patient. Counseled patient on diagnosis and care plan. Patient has expressed understanding, and all questions have been answered. Patient agrees with plan and agrees to follow up as recommended, or to return to the ED if their symptoms worsen. Discharge instructions have been provided and explained to the patient, along with reasons to return to the ED. PLAN:  Current Discharge Medication List        2. Follow-up Information     Follow up With Specialties Details Why Contact Info    Ava David MD General Practice  As needed [facility] [address]  [phone]          3. Return to ED if worse     Diagnosis     Clinical Impression:   1. SOB (shortness of breath)        Attestations:    Bere Ortiz M.D. Please note that this dictation was completed with [employer], the computer voice recognition software. Quite often unanticipated grammatical, syntax, homophones, and other interpretive errors are inadvertently transcribed by the computer software. Please disregard these errors. Please excuse any errors that have escaped final proofreading. Thank you.

## 2019-11-08 NOTE — DISCHARGE INSTRUCTIONS

## 2019-11-30 ENCOUNTER — APPOINTMENT (OUTPATIENT)
Dept: GENERAL RADIOLOGY | Age: 49
End: 2019-11-30
Attending: EMERGENCY MEDICINE
Payer: MEDICARE

## 2019-11-30 ENCOUNTER — HOSPITAL ENCOUNTER (EMERGENCY)
Age: 49
Discharge: HOME | End: 2019-12-01
Attending: EMERGENCY MEDICINE
Payer: MEDICARE

## 2019-11-30 VITALS
HEIGHT: 64 IN | DIASTOLIC BLOOD PRESSURE: 87 MMHG | HEART RATE: 74 BPM | OXYGEN SATURATION: 92 % | RESPIRATION RATE: 21 BRPM | BODY MASS INDEX: 50.02 KG/M2 | SYSTOLIC BLOOD PRESSURE: 124 MMHG | WEIGHT: 293 LBS | TEMPERATURE: 97.5 F

## 2019-11-30 DIAGNOSIS — E66.2 OBESITY HYPOVENTILATION SYNDROME (HCC): Primary | ICD-10-CM

## 2019-11-30 LAB
ANION GAP SERPL CALC-SCNC: 4 MMOL/L (ref 5–15)
BASOPHILS # BLD: 0 K/UL (ref 0–0.1)
BASOPHILS NFR BLD: 0 % (ref 0–1)
BUN SERPL-MCNC: 14 MG/DL (ref 6–20)
BUN/CREAT SERPL: 16 (ref 12–20)
CALCIUM SERPL-MCNC: 9.9 MG/DL (ref 8.5–10.1)
CHLORIDE SERPL-SCNC: 104 MMOL/L (ref 97–108)
CO2 SERPL-SCNC: 33 MMOL/L (ref 21–32)
CREAT SERPL-MCNC: 0.85 MG/DL (ref 0.55–1.02)
DIFFERENTIAL METHOD BLD: ABNORMAL
EOSINOPHIL # BLD: 0.2 K/UL (ref 0–0.4)
EOSINOPHIL NFR BLD: 3 % (ref 0–7)
ERYTHROCYTE [DISTWIDTH] IN BLOOD BY AUTOMATED COUNT: 12.8 % (ref 11.5–14.5)
GLUCOSE SERPL-MCNC: 131 MG/DL (ref 65–100)
HCT VFR BLD AUTO: 43.3 % (ref 35–47)
HGB BLD-MCNC: 12.7 G/DL (ref 11.5–16)
IMM GRANULOCYTES # BLD AUTO: 0 K/UL (ref 0–0.04)
IMM GRANULOCYTES NFR BLD AUTO: 1 % (ref 0–0.5)
LYMPHOCYTES # BLD: 1.1 K/UL (ref 0.8–3.5)
LYMPHOCYTES NFR BLD: 13 % (ref 12–49)
MCH RBC QN AUTO: 27.9 PG (ref 26–34)
MCHC RBC AUTO-ENTMCNC: 29.3 G/DL (ref 30–36.5)
MCV RBC AUTO: 95 FL (ref 80–99)
MONOCYTES # BLD: 0.7 K/UL (ref 0–1)
MONOCYTES NFR BLD: 8 % (ref 5–13)
NEUTS SEG # BLD: 6.5 K/UL (ref 1.8–8)
NEUTS SEG NFR BLD: 76 % (ref 32–75)
NRBC # BLD: 0 K/UL (ref 0–0.01)
NRBC BLD-RTO: 0 PER 100 WBC
PLATELET # BLD AUTO: 120 K/UL (ref 150–400)
PMV BLD AUTO: 11.1 FL (ref 8.9–12.9)
POTASSIUM SERPL-SCNC: 4.3 MMOL/L (ref 3.5–5.1)
RBC # BLD AUTO: 4.56 M/UL (ref 3.8–5.2)
SODIUM SERPL-SCNC: 141 MMOL/L (ref 136–145)
TROPONIN I SERPL-MCNC: <0.05 NG/ML
WBC # BLD AUTO: 8.6 K/UL (ref 3.6–11)

## 2019-11-30 PROCEDURE — 84484 ASSAY OF TROPONIN QUANT: CPT

## 2019-11-30 PROCEDURE — 93005 ELECTROCARDIOGRAM TRACING: CPT

## 2019-11-30 PROCEDURE — 85025 COMPLETE CBC W/AUTO DIFF WBC: CPT

## 2019-11-30 PROCEDURE — 74011250637 HC RX REV CODE- 250/637: Performed by: EMERGENCY MEDICINE

## 2019-11-30 PROCEDURE — 99285 EMERGENCY DEPT VISIT HI MDM: CPT

## 2019-11-30 PROCEDURE — 80048 BASIC METABOLIC PNL TOTAL CA: CPT

## 2019-11-30 PROCEDURE — 36415 COLL VENOUS BLD VENIPUNCTURE: CPT

## 2019-11-30 RX ORDER — IBUPROFEN 600 MG/1
600 TABLET ORAL
Status: COMPLETED | OUTPATIENT
Start: 2019-11-30 | End: 2019-11-30

## 2019-11-30 RX ADMIN — IBUPROFEN 600 MG: 600 TABLET, FILM COATED ORAL at 23:09

## 2019-12-01 ENCOUNTER — APPOINTMENT (OUTPATIENT)
Dept: GENERAL RADIOLOGY | Age: 49
End: 2019-12-01
Attending: EMERGENCY MEDICINE
Payer: MEDICARE

## 2019-12-01 LAB
ATRIAL RATE: 73 BPM
CALCULATED P AXIS, ECG09: 46 DEGREES
CALCULATED R AXIS, ECG10: 69 DEGREES
CALCULATED T AXIS, ECG11: 42 DEGREES
DIAGNOSIS, 93000: NORMAL
P-R INTERVAL, ECG05: 196 MS
Q-T INTERVAL, ECG07: 380 MS
QRS DURATION, ECG06: 78 MS
QTC CALCULATION (BEZET), ECG08: 418 MS
VENTRICULAR RATE, ECG03: 73 BPM

## 2019-12-01 PROCEDURE — 71045 X-RAY EXAM CHEST 1 VIEW: CPT

## 2019-12-01 RX ORDER — IBUPROFEN 600 MG/1
600 TABLET ORAL
Qty: 20 TAB | Refills: 0 | Status: SHIPPED | OUTPATIENT
Start: 2019-12-01 | End: 2019-12-07

## 2019-12-01 NOTE — ED PROVIDER NOTES
EMERGENCY DEPARTMENT HISTORY AND PHYSICAL EXAM      Date: 2019  Patient Name: Che Mcnally  Patient Age and Sex: 54 y.o. female    History of Presenting Illness     Chief Complaint   Patient presents with    Shortness of Breath     Pt states she has been having increased SOB and chest pain that started around 1900 tonight. Pt states pain was in left side of her chest. EMS gave pt 324 mg ASA PTA. Pt also has CHF, COPD and is on 3L nc chronically.  Chest Pain     Pt states chest pain has resolved but pt mainly complaining of the SOB       History Provided By: Patient    Ability to gather history was limited by: None    HPI: Che Mcnally, 54 y.o. female with history of morbid obesity (195 kg), CHF, COPD, diabetes, oxygen dependent at baseline, complains of shortness of breath with exertion today, also left-sided discomfort, sharp in nature, worsened by deep breathing. Also complains of right ear pain for the past 3 days. Her symptoms all seem mild, perhaps slightly worse than baseline. Pt denies any other alleviating or exacerbating factors. There are no other complaints, changes or physical findings at this time.      Past Medical History:   Diagnosis Date    Arthritis     Asthma     Breast lump     CAD (coronary artery disease)     Cancer (HCC)     breast cancer    Congestive heart failure (HCC)     COPD (chronic obstructive pulmonary disease) (HCC)     Diabetes [facility]     Hypertension     Morbid obesity with BMI of 70 and over, adult ([facility]     NSTEMI (non-ST elevated myocardial infarction) ([facility]     SVT (supraventricular tachycardia) ([facility])      Past Surgical History:   Procedure Laterality Date    CARDIAC SURG PROCEDURE UNLIST      Stents    HX  SECTION      HX ORTHOPAEDIC      HX OTHER SURGICAL      cyst removed from back       PCP: Trip Rios MD    Past History     Past Medical History:  Past Medical History:   Diagnosis Date    Arthritis     Asthma  Breast lump     CAD (coronary artery disease)     Cancer (HCC)     breast cancer    Congestive heart failure (HCC)     [facility]     Diabetes [facility]     Hypertension     Morbid obesity with BMI of 70 and over, adult ([facility]     NSTEMI (non-ST elevated myocardial infarction) ([facility]     SVT [facility]supraventricular tachycardia) ([facility]        Past Surgical History:  Past Surgical History:   Procedure Laterality Date    CARDIAC SURG PROCEDURE UNLIST      Stents    HX  SECTION      HX ORTHOPAEDIC      HX OTHER SURGICAL      cyst removed from back       Family History:  Family History   Problem Relation Age of Onset    Heart Disease Mother     Heart Disease Father     Heart Disease Sister     Breast Cancer Maternal Grandmother     Breast Cancer Paternal Grandmother        Social History:  Social History     Tobacco Use    Smoking status: Former Smoker     Packs/day: 0.25     Types: Cigarettes    Smokeless tobacco: Never Used    Tobacco comment: Patient states \"I  aint smoking no more d*mn cigarettes after yesterday\" 2018   Substance Use Topics    Alcohol use: No    Drug use: No       Allergies:  No Known Allergies    Current Medications:  No current facility-administered medications on file prior to encounter. Current Outpatient Medications on File Prior to Encounter   Medication Sig Dispense Refill    nystatin (MYCOSTATIN) topical cream Apply  to affected area two (2) times a day. 15 g 0    alum-mag hydroxide-simeth (MAALOX ADVANCED) 200-200-20 mg/5 mL susp Take 30 mL by mouth every four (4) hours as needed for Pain. 354 mL 0    ranolazine ER (RANEXA) 500 mg SR tablet Take 1 Tab by mouth two (2) times a day. 60 Tab 8    cyclobenzaprine (FLEXERIL) 5 mg tablet Take 1 Tab by mouth two (2) times daily as needed for Muscle Spasm(s).  flexeriol 5 Tab 0    albuterol-ipratropium (DUO-NEB) 2.5 mg-0.5 mg/3 ml nebu 3 mL by Nebulization route four (4) times daily. 100 Nebule 1    furosemide (LASIX) 40 mg tablet Take 40 mg by mouth two (2) times a day.  fluticasone (FLONASE) 50 mcg/actuation nasal spray 2 Sprays by Both Nostrils route daily. 1 Bottle 0    insulin glargine (LANTUS) 100 unit/mL injection 45 Units by SubCUTAneous route daily.  metoprolol tartrate (LOPRESSOR) 25 mg tablet Take 25 mg by mouth two (2) times a day.  fluticasone-salmeterol (ADVAIR DISKUS) 500-50 mcg/dose diskus inhaler Take 1 Puff by inhalation every twelve (12) hours.  loratadine (CLARITIN) 10 mg tablet Take 10 mg by mouth daily.  albuterol (VENTOLIN HFA) 90 mcg/actuation inhaler Take 2 Puffs by inhalation every six (6) hours as needed for Wheezing.  aspirin 81 mg chewable tablet Take 81 mg by mouth daily.  hydrOXYzine pamoate (VISTARIL) 50 mg capsule Take 50 mg by mouth every eight (8) hours as needed for Itching.  lovastatin (MEVACOR) 40 mg tablet Take 1 Tab by mouth nightly. 30 Tab 6    glyBURIDE (DIABETA) 5 mg tablet Take 5 mg by mouth two (2) times daily (with meals).  ammonium lactate (LAC-HYDRIN) 12 % topical cream rub in to affected area well 280 g 1    nitroglycerin (NITROSTAT) 0.4 mg SL tablet 1 Tab by SubLINGual route every five (5) minutes as needed for Chest Pain (call 911 if not relieved by 3). 25 Tab 2       Review of Systems   Review of Systems   Constitutional: Negative for fever. HENT: Positive for ear pain. Respiratory: Positive for shortness of breath. Negative for wheezing. Cardiovascular: Positive for chest pain. Negative for leg swelling. All other systems reviewed and are negative. Physical Exam   Vital Signs  Patient Vitals for the past 24 hrs:   Temp Pulse Resp BP SpO2   11/30/19 2315    124/87 92 %   11/30/19 2245    121/83 92 %   11/30/19 2230    122/80 92 %   11/30/19 2204 97.5 °F (36.4 °C) 74 21 135/88 91 %       Physical Exam  Vitals signs and nursing note reviewed.    Constitutional: General: She is not in acute distress. Appearance: She is well-developed. She is morbidly obese. Comments: Morbidly obese. No apparent acute distress, no respiratory distress. Speaking full sentences with ease. Appears chronically ill. HENT:      Head: Normocephalic and atraumatic. Right Ear: Tympanic membrane normal.      Ears:      Comments: Mild erythema in the ear canal and surrounding the TM. TM itself is normal, no fluid. Mouth/Throat:      Mouth: Mucous membranes are moist.   Eyes:      General:         Right eye: No discharge. Left eye: No discharge. Conjunctiva/sclera: Conjunctivae normal.   Neck:      Musculoskeletal: Normal range of motion and neck supple. Cardiovascular:      Rate and Rhythm: Normal rate and regular rhythm. Heart sounds: Normal heart sounds. No murmur. Pulmonary:      Effort: Pulmonary effort is normal. No respiratory distress. Breath sounds: Normal breath sounds. No wheezing. Abdominal:      General: There is no distension. Palpations: Abdomen is soft. Tenderness: There is no tenderness. Musculoskeletal: Normal range of motion. General: No deformity. Skin:     General: Skin is warm and dry. Findings: No rash. Neurological:      Mental Status: She is alert and oriented to person, place, and time. Psychiatric:         Behavior: Behavior normal.         Thought Content:  Thought content normal.         Diagnostic Study Results   Labs  Recent Results (from the past 24 hour(s))   EKG, 12 LEAD, INITIAL    Collection Time: 11/30/19 10:13 PM   Result Value Ref Range    Ventricular Rate 73 BPM    Atrial Rate 73 BPM    P-R Interval 196 ms    QRS Duration 78 ms    Q-T Interval 380 ms    QTC Calculation (Bezet) 418 ms    Calculated P Axis 46 degrees    Calculated R Axis 69 degrees    Calculated T Axis 42 degrees    Diagnosis       Normal sinus rhythm  Low voltage QRS  When compared with ECG of 13-SEP-2019 21:14,  No significant change was found     CBC WITH AUTOMATED DIFF    Collection Time: 11/30/19 10:49 PM   Result Value Ref Range    WBC 8.6 3.6 - 11.0 K/uL    RBC 4.56 3.80 - 5.20 M/uL    HGB 12.7 11.5 - 16.0 g/dL    HCT 43.3 35.0 - 47.0 %    MCV 95.0 80.0 - 99.0 FL    MCH 27.9 26.0 - 34.0 PG    MCHC 29.3 (L) 30.0 - 36.5 g/dL    RDW 12.8 11.5 - 14.5 %    PLATELET 297 (L) 374 - 400 K/uL    MPV 11.1 8.9 - 12.9 FL    NRBC 0.0 0  WBC    ABSOLUTE NRBC 0.00 0.00 - 0.01 K/uL    NEUTROPHILS 76 (H) 32 - 75 %    LYMPHOCYTES 13 12 - 49 %    MONOCYTES 8 5 - 13 %    EOSINOPHILS 3 0 - 7 %    BASOPHILS 0 0 - 1 %    IMMATURE GRANULOCYTES 1 (H) 0.0 - 0.5 %    ABS. NEUTROPHILS 6.5 1.8 - 8.0 K/UL    ABS. LYMPHOCYTES 1.1 0.8 - 3.5 K/UL    ABS. MONOCYTES 0.7 0.0 - 1.0 K/UL    ABS. EOSINOPHILS 0.2 0.0 - 0.4 K/UL    ABS. BASOPHILS 0.0 0.0 - 0.1 K/UL    ABS. IMM. GRANS. 0.0 0.00 - 0.04 K/UL    DF AUTOMATED     METABOLIC PANEL, BASIC    Collection Time: 11/30/19 10:49 PM   Result Value Ref Range    Sodium 141 136 - 145 mmol/L    Potassium 4.3 3.5 - 5.1 mmol/L    Chloride 104 97 - 108 mmol/L    CO2 33 (H) 21 - 32 mmol/L    Anion gap 4 (L) 5 - 15 mmol/L    Glucose 131 (H) 65 - 100 mg/dL    BUN 14 6 - 20 MG/DL    Creatinine 0.85 0.55 - 1.02 MG/DL    BUN/Creatinine ratio 16 12 - 20      GFR est AA >60 >60 ml/min/1.73m2    GFR est non-AA >60 >60 ml/min/1.73m2    Calcium 9.9 8.5 - 10.1 MG/DL   TROPONIN I    Collection Time: 11/30/19 10:49 PM   Result Value Ref Range    Troponin-I, Qt. <0.05 <0.05 ng/mL       Radiologic Studies  XR CHEST PORT   Final Result   IMPRESSION: Bilateral hilar prominence        CT Results  (Last 48 hours)    None        CXR Results  (Last 48 hours)               12/01/19 0008  XR CHEST PORT Final result    Impression:  IMPRESSION: Bilateral hilar prominence       Narrative:  EXAM: XR CHEST PORT       INDICATION: Shortness of breath       COMPARISON: None.        FINDINGS: A portable AP radiograph of the chest was obtained at 2346 hours. The   lungs are clear. The cardiac and mediastinal contours and pulmonary vascularity   are remarkable for bilateral hilar prominence. The bones and soft tissues are   grossly within normal limits. Procedures   EKG  Date/Time: 11/30/2019 11:08 PM  Performed by: Marco Holden MD  Authorized by: Marco Holden MD     ECG reviewed by ED Physician in the absence of a cardiologist: yes    Interpretation:     Interpretation: non-specific    Rate:     ECG rate assessment: normal    Rhythm:     Rhythm: sinus rhythm    Ectopy:     Ectopy: none    QRS:     QRS axis:  Normal  ST segments:     ST segments:  Normal  T waves:     T waves: normal          Medical Decision Making     Provider Notes (Medical Decision Making):   43-year-old female with morbid obesity, oxygen dependent, complaining of shortness of breath and chest pain. She is in no apparent acute distress. Her lungs are clear bilaterally. EKG normal sinus rhythm, no signs of acute ischemia. Clinical concern for ACS, MI, and PE are all very low. Can defer ABG today, as patient is clearly in no respiratory distress, speaking full sentences with ease with her normal 3 L of oxygen. She has chronic CO2 retention. We will check basic laboratories, troponin, chest x-ray. José Manuel Tsai MD  11:05 PM    Presentation today seems to be uncomplicated chronic obesity hypoventilation syndrome, with some COPD and CHF, but no acute distress or hypoxia. Can safely D/c home, no new Rx. Can try additional dose of daily Lasix for next few days and f/u PMD.    Encouraged to use her BiPAP which she has not been using. Yesika Drake MD      Consult required? No      Medications Administered During ED Course:  Medications   ibuprofen (MOTRIN) tablet 600 mg (600 mg Oral Given 11/30/19 1265)          Diagnosis and Disposition     Disposition:  Discharged    Clinical Impression:   1.  Obesity hypoventilation syndrome ([facility]        Attestation:  I personally performed the services described in this documentation on this date 11/30/2019 for patient [facility]. Jeni Carballo MD        I was the first provider for this patient on this visit. To the best of my ability I reviewed relevant prior medical records, electrocardiograms, laboratories, and radiologic studies. The patient's presenting problems were discussed, and the patient was in agreement with the care plan formulated and outlined with them. Jeni Carballo MD    Please note that this dictation was completed with Dragon voice recognition software. Quite often unanticipated grammatical, syntax, homophones, and other interpretive errors are inadvertently transcribed by the computer software. Please disregard these errors and excuse any errors that have escaped final proofreading.

## 2019-12-01 NOTE — DISCHARGE INSTRUCTIONS
You can take an additional dose of Lasix daily for the next 3 days. Follow up with your doctor. Use your BiPAP as directed.

## 2019-12-03 ENCOUNTER — APPOINTMENT (OUTPATIENT)
Dept: GENERAL RADIOLOGY | Age: 49
DRG: 189 | End: 2019-12-03
Attending: EMERGENCY MEDICINE
Payer: MEDICARE

## 2019-12-03 ENCOUNTER — HOSPITAL ENCOUNTER (EMERGENCY)
Age: 49
Discharge: HOME | DRG: 189 | End: 2019-12-03
Attending: EMERGENCY MEDICINE
Payer: MEDICARE

## 2019-12-03 VITALS
HEIGHT: 66 IN | WEIGHT: 293 LBS | OXYGEN SATURATION: 93 % | RESPIRATION RATE: 20 BRPM | HEART RATE: 86 BPM | TEMPERATURE: 97.8 F | BODY MASS INDEX: 47.09 KG/M2 | DIASTOLIC BLOOD PRESSURE: 87 MMHG | SYSTOLIC BLOOD PRESSURE: 101 MMHG

## 2019-12-03 DIAGNOSIS — R11.10 ACUTE VOMITING: Primary | ICD-10-CM

## 2019-12-03 DIAGNOSIS — K21.9 GASTROESOPHAGEAL REFLUX DISEASE WITHOUT ESOPHAGITIS: ICD-10-CM

## 2019-12-03 DIAGNOSIS — J96.12 CHRONIC RESPIRATORY FAILURE WITH HYPOXIA AND HYPERCAPNIA (HCC): ICD-10-CM

## 2019-12-03 DIAGNOSIS — E66.01 MORBID OBESITY (HCC): ICD-10-CM

## 2019-12-03 DIAGNOSIS — J96.11 CHRONIC RESPIRATORY FAILURE WITH HYPOXIA AND HYPERCAPNIA (HCC): ICD-10-CM

## 2019-12-03 LAB
ALBUMIN SERPL-MCNC: 3.2 G/DL (ref 3.5–5)
ALBUMIN/GLOB SERPL: 0.7 {RATIO} (ref 1.1–2.2)
ALP SERPL-CCNC: 103 U/L (ref 45–117)
ALT SERPL-CCNC: 25 U/L (ref 12–78)
ANION GAP SERPL CALC-SCNC: 2 MMOL/L (ref 5–15)
ARTERIAL PATENCY WRIST A: YES
AST SERPL-CCNC: 16 U/L (ref 15–37)
BASE EXCESS BLD CALC-SCNC: 14 MMOL/L
BASOPHILS # BLD: 0 K/UL (ref 0–0.1)
BASOPHILS NFR BLD: 0 % (ref 0–1)
BDY SITE: ABNORMAL
BILIRUB SERPL-MCNC: 0.5 MG/DL (ref 0.2–1)
BUN SERPL-MCNC: 17 MG/DL (ref 6–20)
BUN/CREAT SERPL: 20 (ref 12–20)
CALCIUM SERPL-MCNC: 10 MG/DL (ref 8.5–10.1)
CHLORIDE SERPL-SCNC: 101 MMOL/L (ref 97–108)
CO2 SERPL-SCNC: 37 MMOL/L (ref 21–32)
CREAT SERPL-MCNC: 0.87 MG/DL (ref 0.55–1.02)
DIFFERENTIAL METHOD BLD: ABNORMAL
EOSINOPHIL # BLD: 0.2 K/UL (ref 0–0.4)
EOSINOPHIL NFR BLD: 3 % (ref 0–7)
ERYTHROCYTE [DISTWIDTH] IN BLOOD BY AUTOMATED COUNT: 12.8 % (ref 11.5–14.5)
GAS FLOW.O2 O2 DELIVERY SYS: ABNORMAL L/MIN
GAS FLOW.O2 SETTING OXYMISER: 4 L/M
GLOBULIN SER CALC-MCNC: 4.4 G/DL (ref 2–4)
GLUCOSE SERPL-MCNC: 146 MG/DL (ref 65–100)
HCO3 BLD-SCNC: 37.7 MMOL/L (ref 22–26)
HCT VFR BLD AUTO: 44.4 % (ref 35–47)
HGB BLD-MCNC: 13.1 G/DL (ref 11.5–16)
IMM GRANULOCYTES # BLD AUTO: 0 K/UL (ref 0–0.04)
IMM GRANULOCYTES NFR BLD AUTO: 0 % (ref 0–0.5)
LIPASE SERPL-CCNC: 144 U/L (ref 73–393)
LYMPHOCYTES # BLD: 1.1 K/UL (ref 0.8–3.5)
LYMPHOCYTES NFR BLD: 15 % (ref 12–49)
MCH RBC QN AUTO: 27.8 PG (ref 26–34)
MCHC RBC AUTO-ENTMCNC: 29.5 G/DL (ref 30–36.5)
MCV RBC AUTO: 94.1 FL (ref 80–99)
MONOCYTES # BLD: 0.7 K/UL (ref 0–1)
MONOCYTES NFR BLD: 9 % (ref 5–13)
NEUTS SEG # BLD: 5.6 K/UL (ref 1.8–8)
NEUTS SEG NFR BLD: 73 % (ref 32–75)
NRBC # BLD: 0 K/UL (ref 0–0.01)
NRBC BLD-RTO: 0 PER 100 WBC
PCO2 BLD: 56 MMHG (ref 35–45)
PH BLD: 7.44 [PH] (ref 7.35–7.45)
PLATELET # BLD AUTO: 150 K/UL (ref 150–400)
PMV BLD AUTO: 10.5 FL (ref 8.9–12.9)
PO2 BLD: 76 MMHG (ref 80–100)
POTASSIUM SERPL-SCNC: 3.6 MMOL/L (ref 3.5–5.1)
PROT SERPL-MCNC: 7.6 G/DL (ref 6.4–8.2)
RBC # BLD AUTO: 4.72 M/UL (ref 3.8–5.2)
SAO2 % BLD: 95 % (ref 92–97)
SODIUM SERPL-SCNC: 140 MMOL/L (ref 136–145)
SPECIMEN TYPE: ABNORMAL
TOTAL RESP. RATE, ITRR: 20
TROPONIN I BLD-MCNC: <0.04 NG/ML (ref 0–0.08)
WBC # BLD AUTO: 7.6 K/UL (ref 3.6–11)

## 2019-12-03 PROCEDURE — 99285 EMERGENCY DEPT VISIT HI MDM: CPT

## 2019-12-03 PROCEDURE — 80053 COMPREHEN METABOLIC PANEL: CPT

## 2019-12-03 PROCEDURE — 36600 WITHDRAWAL OF ARTERIAL BLOOD: CPT

## 2019-12-03 PROCEDURE — 85025 COMPLETE CBC W/AUTO DIFF WBC: CPT

## 2019-12-03 PROCEDURE — 84484 ASSAY OF TROPONIN QUANT: CPT

## 2019-12-03 PROCEDURE — 93005 ELECTROCARDIOGRAM TRACING: CPT

## 2019-12-03 PROCEDURE — 36415 COLL VENOUS BLD VENIPUNCTURE: CPT

## 2019-12-03 PROCEDURE — 94640 AIRWAY INHALATION TREATMENT: CPT

## 2019-12-03 PROCEDURE — 74011000250 HC RX REV CODE- 250: Performed by: EMERGENCY MEDICINE

## 2019-12-03 PROCEDURE — 96375 TX/PRO/DX INJ NEW DRUG ADDON: CPT

## 2019-12-03 PROCEDURE — 96374 THER/PROPH/DIAG INJ IV PUSH: CPT

## 2019-12-03 PROCEDURE — 82803 BLOOD GASES ANY COMBINATION: CPT

## 2019-12-03 PROCEDURE — 74011250637 HC RX REV CODE- 250/637: Performed by: EMERGENCY MEDICINE

## 2019-12-03 PROCEDURE — 83690 ASSAY OF LIPASE: CPT

## 2019-12-03 PROCEDURE — 71045 X-RAY EXAM CHEST 1 VIEW: CPT

## 2019-12-03 PROCEDURE — 74011250636 HC RX REV CODE- 250/636: Performed by: EMERGENCY MEDICINE

## 2019-12-03 RX ORDER — ALBUTEROL SULFATE 2.5 MG/.5ML
5 SOLUTION RESPIRATORY (INHALATION)
Status: COMPLETED | OUTPATIENT
Start: 2019-12-03 | End: 2019-12-03

## 2019-12-03 RX ORDER — ONDANSETRON 2 MG/ML
8 INJECTION INTRAMUSCULAR; INTRAVENOUS
Status: COMPLETED | OUTPATIENT
Start: 2019-12-03 | End: 2019-12-03

## 2019-12-03 RX ORDER — FAMOTIDINE 20 MG/1
20 TABLET, FILM COATED ORAL 2 TIMES DAILY
Qty: 20 TAB | Refills: 0 | Status: SHIPPED | OUTPATIENT
Start: 2019-12-03 | End: 2019-12-07

## 2019-12-03 RX ORDER — ONDANSETRON 8 MG/1
8 TABLET, ORALLY DISINTEGRATING ORAL
Qty: 20 TAB | Refills: 0 | Status: SHIPPED | OUTPATIENT
Start: 2019-12-03 | End: 2019-12-07

## 2019-12-03 RX ADMIN — ONDANSETRON HYDROCHLORIDE 8 MG: 2 INJECTION, SOLUTION INTRAMUSCULAR; INTRAVENOUS at 02:41

## 2019-12-03 RX ADMIN — ALBUTEROL SULFATE 5 MG: 2.5 SOLUTION RESPIRATORY (INHALATION) at 03:05

## 2019-12-03 RX ADMIN — LIDOCAINE HYDROCHLORIDE 40 ML: 20 SOLUTION ORAL; TOPICAL at 03:05

## 2019-12-03 RX ADMIN — FAMOTIDINE 20 MG: 10 INJECTION, SOLUTION INTRAVENOUS at 03:06

## 2019-12-03 NOTE — DISCHARGE INSTRUCTIONS
Thank you for allowing us to take care of you today! We hope we addressed all of your concerns and needs. We strive to provide excellent quality care in the Emergency Department. You will receive a survey after your visit to evaluate the care you were provided. Should you receive a survey from us, we invite you to share your experience and tell us what made it excellent. It was a pleasure serving you, we invite you to share your experience with us, in our pursuit for excellence, should you be selected to receive a survey. The exam and treatment you received in the Emergency Department were for an urgent problem and are not intended as complete care. It is important that you follow up with a doctor, nurse practitioner, or physician assistant for ongoing care. If your symptoms become worse or you do not improve as expected and you are unable to reach your usual health care provider, you should return to the Emergency Department. We are available 24 hours a day. Please take your discharge instructions with you when you go to your follow-up appointment. If you have any problem arranging a follow-up appointment, contact the Emergency Department immediately. If a prescription has been provided, please have it filled as soon as possible to prevent a delay in treatment. Read the entire medication instruction sheet provided to you by the pharmacy. If you have any questions or reservations about taking the medication due to side effects or interactions with other medications, please call your primary care physician or contact the ER to speak with the charge nurse. Make an appointment with your family doctor or the physician you were referred to for follow-up of this visit as instructed on your discharge paperwork, as this is mandatory follow-up. Return to the ER if you are unable to be seen or if you are unable to be seen in a timely manner.     If you have any problem arranging the follow-up visit, contact the Emergency Department immediately. I hope you feel better and thank you again for allow us to provide you with excellent care today at [facility]! Warmest regards,    Mariola Hernandez MD  Emergency Medicine Physician  [facility]        _____________________________________________________________________________________________________________    Vitals:    12/03/19 0216 12/03/19 0315   BP: 131/85 101/87   BP Patient Position: At rest    Pulse: 86    Resp: 20    Temp: 97.8 °F (36.6 °C)    SpO2: 92% 93%   Weight: (!) 186.9 kg (412 lb)    Height: 5' 6\" (1.676 m)        Recent Results (from the past 12 hour(s))   METABOLIC PANEL, COMPREHENSIVE    Collection Time: 12/03/19  2:12 AM   Result Value Ref Range    Sodium 140 136 - 145 mmol/L    Potassium 3.6 3.5 - 5.1 mmol/L    Chloride 101 97 - 108 mmol/L    CO2 37 (H) 21 - 32 mmol/L    Anion gap 2 (L) 5 - 15 mmol/L    Glucose 146 (H) 65 - 100 mg/dL    BUN 17 6 - 20 MG/DL    Creatinine 0.87 0.55 - 1.02 MG/DL    BUN/Creatinine ratio 20 12 - 20      GFR est AA >60 >60 ml/min/1.73m2    GFR est non-AA >60 >60 ml/min/1.73m2    Calcium 10.0 8.5 - 10.1 MG/DL    Bilirubin, total 0.5 0.2 - 1.0 MG/DL    ALT (SGPT) 25 12 - 78 U/L    AST (SGOT) 16 15 - 37 U/L    Alk.  phosphatase 103 45 - 117 U/L    Protein, total 7.6 6.4 - 8.2 g/dL    Albumin 3.2 (L) 3.5 - 5.0 g/dL    Globulin 4.4 (H) 2.0 - 4.0 g/dL    A-G Ratio 0.7 (L) 1.1 - 2.2     CBC WITH AUTOMATED DIFF    Collection Time: 12/03/19  2:12 AM   Result Value Ref Range    WBC 7.6 3.6 - 11.0 K/uL    RBC 4.72 3.80 - 5.20 M/uL    HGB 13.1 11.5 - 16.0 g/dL    HCT 44.4 35.0 - 47.0 %    MCV 94.1 80.0 - 99.0 FL    MCH 27.8 26.0 - 34.0 PG    MCHC 29.5 (L) 30.0 - 36.5 g/dL    RDW 12.8 11.5 - 14.5 %    PLATELET 348 739 - 565 K/uL    MPV 10.5 8.9 - 12.9 FL    NRBC 0.0 0  WBC    ABSOLUTE NRBC 0.00 0.00 - 0.01 K/uL    NEUTROPHILS 73 32 - 75 % LYMPHOCYTES 15 12 - 49 %    MONOCYTES 9 5 - 13 %    EOSINOPHILS 3 0 - 7 %    BASOPHILS 0 0 - 1 %    IMMATURE GRANULOCYTES 0 0.0 - 0.5 %    ABS. NEUTROPHILS 5.6 1.8 - 8.0 K/UL    ABS. LYMPHOCYTES 1.1 0.8 - 3.5 K/UL    ABS. MONOCYTES 0.7 0.0 - 1.0 K/UL    ABS. EOSINOPHILS 0.2 0.0 - 0.4 K/UL    ABS. BASOPHILS 0.0 0.0 - 0.1 K/UL    ABS. IMM. GRANS. 0.0 0.00 - 0.04 K/UL    DF AUTOMATED     LIPASE    Collection Time: 12/03/19  2:12 AM   Result Value Ref Range    Lipase 144 73 - 393 U/L   POC TROPONIN-I    Collection Time: 12/03/19  3:00 AM   Result Value Ref Range    Troponin-I (POC) <0.04 0.00 - 0.08 ng/mL   EKG, 12 LEAD, INITIAL    Collection Time: 12/03/19  3:04 AM   Result Value Ref Range    Ventricular Rate 81 BPM    Atrial Rate 81 BPM    P-R Interval 154 ms    QRS Duration 78 ms    Q-T Interval 384 ms    QTC Calculation (Bezet) 446 ms    Calculated P Axis -13 degrees    Calculated R Axis 69 degrees    Calculated T Axis 37 degrees    Diagnosis       Normal sinus rhythm  Low voltage QRS  When compared with ECG of 30-NOV-2019 22:13,  No significant change was found     POC G3 - PUL    Collection Time: 12/03/19  3:05 AM   Result Value Ref Range    pH (POC) 7.437 7.35 - 7.45      pCO2 (POC) 56.0 (H) 35.0 - 45.0 MMHG    pO2 (POC) 76 (L) 80 - 100 MMHG    HCO3 (POC) 37.7 (H) 22 - 26 MMOL/L    sO2 (POC) 95 92 - 97 %    Base excess (POC) 14 mmol/L    Site RIGHT RADIAL      Device: NASAL CANNULA      Flow rate (POC) 4 L/M    Allens test (POC) YES      Specimen type (POC) ARTERIAL      Total resp.  rate 20         XR CHEST PORT    (Results Pending)     CT Results  (Last 48 hours)    None          Local Primary Care Physicians   [facility] [phone]  MD Dangelo Givens MD Cranford Lauber, MD [facility] Jaciel Monie Reyes P  Ventura Walter, MD Selam Carmona MD [address] [phone]  MD Didier Bledsoe MD [facility] [employer] [phone]  Sudha Dobson, MD Tiago Booker, MD Keiko Slaughter, MD Nirmala Alan MD   [facility] [phone]  QQEX BEWRMM IT, MD Sanju Huynh, MD Komal Hodges, NP [address] [phone]  Emily Chao, MD Fatuma De Luna, MD Alley Sykes, MD Yessi Nowak, MD Neville Pappas, MD Maryjane Bullock, MD Michelle Butler MD   [address] [facility]  Casey Manning MD [facility] [phone]  Dinorah Mckeon, MD Aishwarya Marx, NP  Scottie Ramos, MD Gaviota Hernandez, MD Ankit Middleton, MD Ilene Bolton, MD Inez Schaeffer, MD   [facility] [phone]  Shanelle Kramer, MD Elena Bhakta, [facility]  Albert Cooper, MARLEEN Gutierrez, MD Shikha Mclean, MD Rodrigo Covarrubias, MD Cheryle Fritz, MD [facility] [phone]  Beckie Cohn, MD Reggy Dubin, MD Concetta Williamson, MD Vin Ann MD   [address] [phone]  MD Paulie Allen, MD [facility] [phone]  MD Andreea Pettit, MD Dick Kruger MD   [facility] [phone]  Triston Woody, MD Evan Benjamin, MD Temple Mola, MD Robinette Osier, MD Maximino Rubinstein, MD Enma Wolff, NP  Sky George MD [address]   [phone]  MD Kyleigh Myers MD Anda Marseilles, MD     [address] [facility] [phone]  Alma Stock, MD Akira Simon, [facility]  Bernie Cargo, PA-C  Bernie Cargo, FNP  Sammy Brumfield, PAAmberC  Florance Grapes, MD Denver Leu, MARLEEN Bell, DO   Miscellaneous:  Lacy Osei MD [facility] Departments   For adult and child immunizations, family planning, TB screening, STD testing and women's health services.    [place]: [employer] [phone]     [place] [phone] [facility]   [phone]   [place]   [phone]   [facility]   [phone] [address] [facility] [phone]     [place] [phone]     [facility],[address]  For primary care services, woman and child wellness, and some clinics providing specialty care. [facility] -- [facility]. [facility] [phone]   411 [facility] 200 [facility] 3614 [facility] [phone]   [address] [facility] [address] [place] [address] [phone]   [address] [facility] [employer] 1604 [facility] 5850  Community  [phone]   77[address] [facility] [address] [address] [place] [phone]   [facility] 81 [facility] [phone]   [facility] [address] [place] [address] [phone]   [facility]: [facility] [address] [place] [address], [address]     [place] [address] Micheal Hernandez [address]  Community  [phone]   [employer]  [address] ([email or web address])         Sexual Health/Woman Wellness Clinics   For STD/HIV testing and treatment, pregnancy testing and services, men's health, birth control services, LGBT services, and hepatitis/HPV vaccine services. [employer] [place] [employer] [address] [facility] 75 [facility] 1579 [address] [facility] [phone]   [facility] [address], 5th floor [phone]   Pregnancy [address] [facility] [address]  Davina Mize [phone]        [place] [address] 454 [facility]   [phone]   [place]   [phone]   Women, Infant and Children's Services: [address] [phone] 900 [facility] [address]       Patient Education        Indigestion (Dyspepsia or Heartburn): Care Instructions  Your Care Instructions  Sometimes it can be hard to pinpoint the cause of indigestion. (It is also called dyspepsia or heartburn.) Most cases of an upset stomach with bloating, burning, burping, and nausea are minor and go away within several hours. Home treatment and over-the-counter medicine often are able to control symptoms. But if you take medicine to relieve your indigestion without making diet and lifestyle changes, your symptoms are likely to return again and again. If you get indigestion often, it may be a sign of a more serious medical problem. Be sure to follow up with your doctor, who may want to do tests to be sure of the cause of your indigestion. Follow-up care is a key part of your treatment and safety. Be sure to make and go to all appointments, and call your doctor if you are having problems. It's also a good idea to know your test results and keep a list of the medicines you take. How can you care for yourself at home? · Your doctor may recommend over-the-counter medicine. For mild or occasional indigestion, antacids such as Gaviscon, Mylanta, Maalox, or Tums, may help. Be safe with medicines. Be careful when you take over-the-counter antacid medicines. Many of these medicines have aspirin in them. Read the label to make sure that you are not taking more than the recommended dose. Too much aspirin can be harmful. · Your doctor also may recommend over-the-counter acid reducers, such as Pepcid AC, Tagamet HB, Zantac 75, or Prilosec. Read and follow all instructions on the label. If you use these medicines often, talk with your doctor. · Change your eating habits. ? It's best to eat several small meals instead of two or three large meals. ?  After you eat, wait 2 to 3 hours before you lie down. ? Chocolate, mint, and alcohol can make GERD worse. ? Spicy foods, foods that have a lot of acid (like tomatoes and oranges), and coffee can make GERD symptoms worse in some people. If your symptoms are worse after you eat a certain food, you may want to stop eating that food to see if your symptoms get better. · Do not smoke or chew tobacco. Smoking can make GERD worse. If you need help quitting, talk to your doctor about stop-smoking programs and medicines. These can increase your chances of quitting for good. · If you have GERD symptoms at night, raise the head of your bed 6 to 8 inches. You can do this by putting the frame on blocks or placing a foam wedge under the head of your mattress. (Adding extra pillows does not work.)  · Do not wear tight clothing around your middle. · Lose weight if you need to. Losing just 5 to 10 pounds can help. · Do not take anti-inflammatory medicines, such as aspirin, ibuprofen (Advil, Motrin), or naproxen (Aleve). These can irritate the stomach. If you need a pain medicine, try acetaminophen (Tylenol), which does not cause stomach upset. When should you call for help? Call your doctor now or seek immediate medical care if:    · You have new or worse belly pain.     · You are vomiting.    Watch closely for changes in your health, and be sure to contact your doctor if:    · You have new or worse symptoms of indigestion.     · You have trouble or pain swallowing.     · You are losing weight.     · You do not get better as expected. Where can you learn more? Go to http://cassia-donte.info/. Enter V722 in the search box to learn more about \"Indigestion (Dyspepsia or Heartburn): Care Instructions. \"  Current as of: November 7, 2018  Content Version: 12.2  © 9140-6261 [employer]. Care instructions adapted under license by [employer] (which disclaims liability or warranty for this information).  If you have questions about a medical condition or this instruction, always ask your healthcare professional. [place] [address] any warranty or liability for your use of this information.

## 2019-12-03 NOTE — ED PROVIDER NOTES
EMERGENCY DEPARTMENT HISTORY AND PHYSICAL EXAM      Please note that this dictation was completed with [employer], the computer voice recognition software. Quite often unanticipated grammatical, syntax, homophones, and other interpretive errors are inadvertently transcribed by the computer software. Please disregard these errors and any errors that have escaped final proofreading. Thank you. Date: 12/3/2019  Patient Name: Sade Nails  Patient Age and Sex: 54 y.o. female    History of Presenting Illness     Chief Complaint   Patient presents with    Nausea     Per ems pt is coming from home. Pt reports feeling like vomiting around 2030 when having dinner. Reports V/N. Hx of COPD/diabetes/CHF. 3L NC at all times.  Vomiting       History Provided By: Patient and EMS    HPI: Sade Nails, 54 y.o. female with past medical history as documented below presents to the ED with c/o of acute onset of nausea and vomiting about 2 hours prior to arrival.  Patient comes by EMS with the above complaint. Patient reports having an episode of emesis around 8:30 PM this evening. Patient states that she was eating potatoes, hot dog and hamburger when vomiting occurred. Patient reported episode of emesis that is nonbloody or nonbilious. Patient does have a history of COPD and wears chronically 3 L of oxygen. She denies any significant wheezing, shortness of breath or chest pain. She has been in the emergency room multiple times in the past 2 weeks for the above complaint. Vital signs per EMS, blood pressure 113/80, heart rate 83, respirations 24, sats 92% Pt denies any other alleviating or exacerbating factors. Additionally, pt specifically denies any recent fever, chills, headache, CP, SOB, lightheadedness, dizziness, numbness, weakness, BLE swelling, heart palpitations, urinary sxs, diarrhea, constipation, melena, hematochezia. There are no other complaints, changes or physical findings at this time.      PCP: Micheal Britton MD    Past History   Past Medical History:  Past Medical History:   Diagnosis Date    Arthritis     Asthma     Breast lump     CAD (coronary artery disease)     Cancer ([facility]     breast cancer    Congestive heart failure (HCC)     COPD (chronic obstructive pulmonary disease) ([facility]     Diabetes ([facility]     Hypertension     Morbid obesity with BMI of 70 and over, adult ([facility]     NSTEMI (non-ST elevated myocardial infarction) ([facility]     SVT (supraventricular tachycardia) ([place] [id number]        Past Surgical History:  Past Surgical History:   Procedure Laterality Date    CARDIAC SURG PROCEDURE UNLIST      Stents    HX  SECTION      HX ORTHOPAEDIC      HX OTHER SURGICAL      cyst removed from back       Family History:  Family History   Problem Relation Age of Onset    Heart Disease Mother     Heart Disease Father     Heart Disease Sister     Breast Cancer Maternal Grandmother     Breast Cancer Paternal Grandmother        Social History:  Social History     Tobacco Use    Smoking status: Former Smoker     Packs/day: 0.25     Types: Cigarettes    Smokeless tobacco: Never Used    Tobacco comment: Patient states \"I  aint smoking no more d*mn cigarettes after yesterday\" 2018   Substance Use Topics    Alcohol use: No    Drug use: No       Allergies:  No Known Allergies    Current Medications:  No current facility-administered medications on file prior to encounter. Current Outpatient Medications on File Prior to Encounter   Medication Sig Dispense Refill    ibuprofen (MOTRIN) 600 mg tablet Take 1 Tab by mouth every six (6) hours as needed for Pain. 20 Tab 0    nystatin (MYCOSTATIN) topical cream Apply  to affected area two (2) times a day. 15 g 0    alum-mag hydroxide-simeth (MAALOX ADVANCED) 200-200-20 mg/5 mL susp Take 30 mL by mouth every four (4) hours as needed for Pain. 354 mL 0    ranolazine ER (RANEXA) 500 mg SR tablet Take 1 Tab by mouth two (2) times a day. 60 Tab 8    cyclobenzaprine (FLEXERIL) 5 mg tablet Take 1 Tab by mouth two (2) times daily as needed for Muscle Spasm(s). flexeriol 5 Tab 0    albuterol-ipratropium (DUO-NEB) 2.5 mg-0.5 mg/3 ml nebu 3 mL by Nebulization route four (4) times daily. 100 Nebule 1    furosemide (LASIX) 40 mg tablet Take 40 mg by mouth two (2) times a day.  fluticasone (FLONASE) 50 mcg/actuation nasal spray 2 Sprays by Both Nostrils route daily. 1 Bottle 0    insulin glargine (LANTUS) 100 unit/mL injection 45 Units by SubCUTAneous route daily.  metoprolol tartrate (LOPRESSOR) 25 mg tablet Take 25 mg by mouth two (2) times a day.  fluticasone-salmeterol (ADVAIR DISKUS) 500-50 mcg/dose diskus inhaler Take 1 Puff by inhalation every twelve (12) hours.  loratadine (CLARITIN) 10 mg tablet Take 10 mg by mouth daily.  albuterol (VENTOLIN HFA) 90 mcg/actuation inhaler Take 2 Puffs by inhalation every six (6) hours as needed for Wheezing.  aspirin 81 mg chewable tablet Take 81 mg by mouth daily.  hydrOXYzine pamoate (VISTARIL) 50 mg capsule Take 50 mg by mouth every eight (8) hours as needed for Itching.  lovastatin (MEVACOR) 40 mg tablet Take 1 Tab by mouth nightly. 30 Tab 6    glyBURIDE (DIABETA) 5 mg tablet Take 5 mg by mouth two (2) times daily (with meals).  ammonium lactate (LAC-HYDRIN) 12 % topical cream rub in to affected area well 280 g 1    nitroglycerin (NITROSTAT) 0.4 mg SL tablet 1 Tab by SubLINGual route every five (5) minutes as needed for Chest Pain (call 911 if not relieved by 3). 25 Tab 2       Review of Systems   Review of Systems   Constitutional: Negative. Negative for chills and fever. HENT: Negative. Negative for congestion, facial swelling, rhinorrhea, sore throat, trouble swallowing and voice change. Eyes: Negative. Respiratory: Positive for cough and wheezing. Negative for apnea, chest tightness and shortness of breath. Cardiovascular: Negative.   Negative for chest pain, palpitations and leg swelling. Gastrointestinal: Positive for nausea and vomiting. Negative for abdominal distention, abdominal pain, blood in stool, constipation and diarrhea. Endocrine: Negative. Negative for cold intolerance, heat intolerance and polyuria. Genitourinary: Negative. Negative for difficulty urinating, dysuria, flank pain, frequency, hematuria and urgency. Musculoskeletal: Negative. Negative for arthralgias, back pain, myalgias, neck pain and neck stiffness. Skin: Negative. Negative for color change and rash. Neurological: Negative. Negative for dizziness, syncope, facial asymmetry, speech difficulty, weakness, light-headedness, numbness and headaches. Hematological: Negative. Does not bruise/bleed easily. Psychiatric/Behavioral: Negative. Negative for confusion and self-injury. The patient is not nervous/anxious. Physical Exam   Physical Exam  Vitals signs and nursing note reviewed. Chaperone present: Morbidly obese female, sitting upright in stretcher. Constitutional:       General: She is not in acute distress. Appearance: She is well-developed. She is not diaphoretic. HENT:      Head: Normocephalic and atraumatic. Mouth/Throat:      Pharynx: No oropharyngeal exudate. Eyes:      Conjunctiva/sclera: Conjunctivae normal.      Pupils: Pupils are equal, round, and reactive to light. Neck:      Musculoskeletal: Normal range of motion. Cardiovascular:      Rate and Rhythm: Normal rate and regular rhythm. Heart sounds: Normal heart sounds. No murmur. No friction rub. No gallop. Pulmonary:      Effort: Pulmonary effort is normal. No respiratory distress. Breath sounds: Wheezing present. No rales. Comments: She is on 3 L of oxygen, speaking in full sentences, no respiratory distress. Chest:      Chest wall: No tenderness. Abdominal:      General: Bowel sounds are normal. There is no distension.       Palpations: Abdomen is soft. There is no mass. Tenderness: There is no tenderness. There is no guarding or rebound. Musculoskeletal: Normal range of motion. General: No tenderness or deformity. Skin:     General: Skin is warm. Findings: No rash. Comments: Chronic skin changes over the bilateral lower extremities, woody edema, neurovascular intact distally. Neurological:      Mental Status: She is alert and oriented to person, place, and time. Cranial Nerves: No cranial nerve deficit. Motor: No abnormal muscle tone. Coordination: Coordination normal.      Deep Tendon Reflexes: Reflexes normal.         Diagnostic Study Results     Labs -  Recent Results (from the past 24 hour(s))   METABOLIC PANEL, COMPREHENSIVE    Collection Time: 12/03/19  2:12 AM   Result Value Ref Range    Sodium 140 136 - 145 mmol/L    Potassium 3.6 3.5 - 5.1 mmol/L    Chloride 101 97 - 108 mmol/L    CO2 37 (H) 21 - 32 mmol/L    Anion gap 2 (L) 5 - 15 mmol/L    Glucose 146 (H) 65 - 100 mg/dL    BUN 17 6 - 20 MG/DL    Creatinine 0.87 0.55 - 1.02 MG/DL    BUN/Creatinine ratio 20 12 - 20      GFR est AA >60 >60 ml/min/1.73m2    GFR est non-AA >60 >60 ml/min/1.73m2    Calcium 10.0 8.5 - 10.1 MG/DL    Bilirubin, total 0.5 0.2 - 1.0 MG/DL    ALT (SGPT) 25 12 - 78 U/L    AST (SGOT) 16 15 - 37 U/L    Alk.  phosphatase 103 45 - 117 U/L    Protein, total 7.6 6.4 - 8.2 g/dL    Albumin 3.2 (L) 3.5 - 5.0 g/dL    Globulin 4.4 (H) 2.0 - 4.0 g/dL    A-G Ratio 0.7 (L) 1.1 - 2.2     CBC WITH AUTOMATED DIFF    Collection Time: 12/03/19  2:12 AM   Result Value Ref Range    WBC 7.6 3.6 - 11.0 K/uL    RBC 4.72 3.80 - 5.20 M/uL    HGB 13.1 11.5 - 16.0 g/dL    HCT 44.4 35.0 - 47.0 %    MCV 94.1 80.0 - 99.0 FL    MCH 27.8 26.0 - 34.0 PG    MCHC 29.5 (L) 30.0 - 36.5 g/dL    RDW 12.8 11.5 - 14.5 %    PLATELET 120 775 - 922 K/uL    MPV 10.5 8.9 - 12.9 FL    NRBC 0.0 0  WBC    ABSOLUTE NRBC 0.00 0.00 - 0.01 K/uL    NEUTROPHILS 73 32 - 75 % LYMPHOCYTES 15 12 - 49 %    MONOCYTES 9 5 - 13 %    EOSINOPHILS 3 0 - 7 %    BASOPHILS 0 0 - 1 %    IMMATURE GRANULOCYTES 0 0.0 - 0.5 %    ABS. NEUTROPHILS 5.6 1.8 - 8.0 K/UL    ABS. LYMPHOCYTES 1.1 0.8 - 3.5 K/UL    ABS. MONOCYTES 0.7 0.0 - 1.0 K/UL    ABS. EOSINOPHILS 0.2 0.0 - 0.4 K/UL    ABS. BASOPHILS 0.0 0.0 - 0.1 K/UL    ABS. IMM. GRANS. 0.0 0.00 - 0.04 K/UL    DF AUTOMATED     LIPASE    Collection Time: 12/03/19  2:12 AM   Result Value Ref Range    Lipase 144 73 - 393 U/L   POC TROPONIN-I    Collection Time: 12/03/19  3:00 AM   Result Value Ref Range    Troponin-I (POC) <0.04 0.00 - 0.08 ng/mL   EKG, 12 LEAD, INITIAL    Collection Time: 12/03/19  3:04 AM   Result Value Ref Range    Ventricular Rate 81 BPM    Atrial Rate 81 BPM    P-R Interval 154 ms    QRS Duration 78 ms    Q-T Interval 384 ms    QTC Calculation (Bezet) 446 ms    Calculated P Axis -13 degrees    Calculated R Axis 69 degrees    Calculated T Axis 37 degrees    Diagnosis       Normal sinus rhythm  Low voltage QRS  When compared with ECG of 30-NOV-2019 22:13,  No significant change was found     POC G3 - PUL    Collection Time: 12/03/19  3:05 AM   Result Value Ref Range    pH (POC) 7.437 7.35 - 7.45      pCO2 (POC) 56.0 (H) 35.0 - 45.0 MMHG    pO2 (POC) 76 (L) 80 - 100 MMHG    HCO3 (POC) 37.7 (H) 22 - 26 MMOL/L    sO2 (POC) 95 92 - 97 %    Base excess (POC) 14 mmol/L    Site RIGHT RADIAL      Device: NASAL CANNULA      Flow rate (POC) 4 L/M    Allens test (POC) YES      Specimen type (POC) ARTERIAL      Total resp. rate 20         Radiologic Studies -   XR CHEST PORT   Final Result   IMPRESSION: No evidence of acute cardiopulmonary process. CT Results  (Last 48 hours)    None        CXR Results  (Last 48 hours)               12/03/19 0409  XR CHEST PORT Final result    Impression:  IMPRESSION: No evidence of acute cardiopulmonary process.            Narrative:  INDICATION: Shortness of breath       COMPARISON: November 30, 2019 FINDINGS: AP portable imaging of the chest performed at 3:48 AM demonstrates a   stable cardiomediastinal silhouette. No new airspace disease is evident. No   significant osseous abnormalities are seen. Medical Decision Making   I am the first provider for this patient. I reviewed the vital signs, available nursing notes, past medical history, past surgical history, family history and social history. Vital Signs-Reviewed the patient's vital signs. Patient Vitals for the past 24 hrs:   Temp Pulse Resp BP SpO2   12/03/19 0315    101/87 93 %   12/03/19 0216 97.8 °F (36.6 °C) 86 20 131/85 92 %       Pulse Oximetry Analysis - 93% on RA    Cardiac Monitor:   Rate: 86 bpm  Rhythm: Normal Sinus Rhythm      ED EKG interpretation:  Rhythm: normal sinus rhythm; and regular . Rate (approx.): 81; Axis: normal; P wave: normal; QRS interval: normal ; ST/T wave: normal; Other findings: normal. This EKG was interpreted by Sean Choi M.D. Records Reviewed: Nursing Notes, Old Medical Records, Previous electrocardiograms, Previous Radiology Studies and Previous Laboratory Studies    Provider Notes (Medical Decision Making):   Patient presents with acute vomiting and dyspnea. DDx: asthma, copd, pna, pulmonary edema, acute bronchitis, ACS, ptx, pna. Will obtain EKG, labs, CXR, provide O2 as needed for hypoxia, treat symptomatically and reassess. Will continue to monitor closely in ED. ED Course:   Initial assessment performed. The patients presenting problems have been discussed, and they are in agreement with the care plan formulated and outlined with them. I have encouraged them to ask questions as they arise throughout their visit. HYPERTENSION COUNSELING   Education was provided to the patient today regarding their hypertension. Patient is made aware of their elevated blood pressure and is instructed to follow up this week with their Primary Care for a recheck.  Patient is counseled regarding consequences of chronic, uncontrolled hypertension including kidney disease, heart disease, stroke or even death. Patient states their understanding and agrees to follow up this week. Additionally, during their visit, I discussed sodium restriction, maintaining ideal body weight and regular exercise program as physiologic means to achieve blood pressure control. The patient will strive towards this. I reviewed our electronic medical record system for any past medical records that were available that may contribute to the patient's current condition, the nursing notes and vital signs from today's visit. Cassandra Schrader MD    ED Orders Placed :  Orders Placed This Encounter    XR CHEST PORT    METABOLIC PANEL, COMPREHENSIVE    CBC WITH AUTOMATED DIFF    URINALYSIS W/ REFLEX CULTURE    LIPASE    BLOOD GAS, ARTERIAL    POC G3 - PUL    PO CHALLENGE    POC TROPONIN-I    POC TROPONIN-I    EKG 12 LEAD INITIAL    INSERT PERIPHERAL IV ONE TIME STAT    ondansetron (ZOFRAN) injection 8 mg    maalox/viscous lidocaine (COV GI COCKTAIL)    famotidine (PF) (PEPCID) 20 mg in 0.9% sodium chloride 10 mL injection    albuterol CONCENTRATE 2.5mg/0.5 mL neb soln    ondansetron (ZOFRAN ODT) 8 mg disintegrating tablet    aluminum-magnesium hydroxide (MAALOX) 200-200 mg/5 mL suspension    famotidine (PEPCID) 20 mg tablet     ED Medications Administered:  Medications   ondansetron (ZOFRAN) injection 8 mg (8 mg IntraVENous Given 12/3/19 0241)   maalox/viscous lidocaine (COV GI COCKTAIL) (40 mL Oral Given 12/3/19 0305)   famotidine (PF) (PEPCID) 20 mg in 0.9% sodium chloride 10 mL injection (20 mg IntraVENous Given 12/3/19 0306)   albuterol CONCENTRATE 2.5mg/0.5 mL neb soln (5 mg Nebulization Given 12/3/19 0305)         Progress Note:  I have re-examined the patient. she feels much better and symptoms improved. Tolerating oral intake. Abdomen is soft and without guarding, rebound or other peritoneal signs.  I have discussed with patient the importance of close f/u and to return to the ED if symptoms don't improve or worsen. Progress Note:  Patient has been reassessed and reports feeling better and symptoms have improved significantly after ED treatment. Patient feels comfortable going home with close follow-up. Yahaira Ro's final labs and imaging have been reviewed with her and available family and/or caregiver. They have been counseled regarding her diagnosis. She verbally conveys understanding and agreement of the signs, symptoms, diagnosis, treatment and prognosis and additionally agrees to follow up as recommended with Dr. Cornelius Moore MD and/or specialist in 24 - 48 hours. She also agrees with the care-plan we created together and conveys that all of her questions have been answered. I have also put together some discharge instructions for her that include: 1) educational information regarding their diagnosis, 2) how to care for their diagnosis at home, as well a 3) list of reasons why they would want to return to the ED prior to their follow-up appointment should the patient's condition change or symptoms worsen. I have answered all questions to the patient's satisfaction. Strict return precautions given. She both understood and agreed with plan as discussed. Vital signs stable for discharge. Disposition: DISCHARGE  The pt is ready for discharge. The pt's signs, symptoms, diagnosis, and discharge instructions have been discussed and pt has conveyed their understanding. The pt is to follow up as recommended or return to ER should their symptoms worsen. Plan has been discussed and pt is in agreement. PLAN:  1. Return precautions as discussed. 2.   Discharge Medication List as of 12/3/2019  4:39 AM      START taking these medications    Details   ondansetron (ZOFRAN ODT) 8 mg disintegrating tablet Take 1 Tab by mouth every eight (8) hours as needed for Nausea. , Print, Disp-20 Tab, R-0 aluminum-magnesium hydroxide (MAALOX) 200-200 mg/5 mL suspension Take 15 mL by mouth every six (6) hours as needed for Indigestion. , Print, Disp-300 mL, R-0      famotidine (PEPCID) 20 mg tablet Take 1 Tab by mouth two (2) times a day for 10 days. , Print, Disp-20 Tab, R-0         CONTINUE these medications which have NOT CHANGED    Details   ibuprofen (MOTRIN) 600 mg tablet Take 1 Tab by mouth every six (6) hours as needed for Pain., Normal, Disp-20 Tab, R-0      nystatin (MYCOSTATIN) topical cream Apply  to affected area two (2) times a day., Normal, Disp-15 g, R-0      alum-mag hydroxide-simeth (MAALOX ADVANCED) 200-200-20 mg/5 mL susp Take 30 mL by mouth every four (4) hours as needed for Pain., Normal, Disp-354 mL, R-0      ranolazine ER (RANEXA) 500 mg SR tablet Take 1 Tab by mouth two (2) times a day., Normal, Disp-60 Tab, R-8      cyclobenzaprine (FLEXERIL) 5 mg tablet Take 1 Tab by mouth two (2) times daily as needed for Muscle Spasm(s). flexeriol, Print, Disp-5 Tab, R-0      albuterol-ipratropium (DUO-NEB) 2.5 mg-0.5 mg/3 ml nebu 3 mL by Nebulization route four (4) times daily. , Normal, Disp-100 Nebule, R-1      furosemide (LASIX) 40 mg tablet Take 40 mg by mouth two (2) times a day., Historical Med      fluticasone (FLONASE) 50 mcg/actuation nasal spray 2 Sprays by Both Nostrils route daily. , Print, Disp-1 Bottle, R-0      insulin glargine (LANTUS) 100 unit/mL injection 45 Units by SubCUTAneous route daily. , Historical Med      metoprolol tartrate (LOPRESSOR) 25 mg tablet Take 25 mg by mouth two (2) times a day., Historical Med      fluticasone-salmeterol (ADVAIR DISKUS) 500-50 mcg/dose diskus inhaler Take 1 Puff by inhalation every twelve (12) hours. , Historical Med      loratadine (CLARITIN) 10 mg tablet Take 10 mg by mouth daily. , Historical Med      albuterol (VENTOLIN HFA) 90 mcg/actuation inhaler Take 2 Puffs by inhalation every six (6) hours as needed for Wheezing., Historical Med      aspirin 81 mg chewable tablet Take 81 mg by mouth daily. , Historical Med      hydrOXYzine pamoate (VISTARIL) 50 mg capsule Take 50 mg by mouth every eight (8) hours as needed for Itching., Historical Med      lovastatin (MEVACOR) 40 mg tablet Take 1 Tab by mouth nightly., Normal, Disp-30 Tab, R-6      glyBURIDE (DIABETA) 5 mg tablet Take 5 mg by mouth two (2) times daily (with meals). , Historical Med      ammonium lactate (LAC-HYDRIN) 12 % topical cream rub in to affected area well, Print, Disp-280 g, R-1      nitroglycerin (NITROSTAT) 0.4 mg SL tablet 1 Tab by SubLINGual route every five (5) minutes as needed for Chest Pain (call 911 if not relieved by 3). , Normal, Disp-25 Tab, R-2           3. Follow-up Information     Follow up With Specialties Details Why Contact Info    Raquel Bynum, [address] [facility] [address]  [phone]      [facility] EMERGENCY DEPT Emergency Medicine  As needed, If symptoms worsen [address] [place] [address] Drive  [address] [facility]  [phone]          Return to ED if worse  Diagnosis     Clinical Impression:   1. Acute vomiting    2. Chronic respiratory failure with hypoxia and hypercapnia (HCC)    3. Gastroesophageal reflux disease without esophagitis    4. Morbid obesity ([address]        Attestation:  I personally performed the services described in this documentation on this date 12/3/2019 for patient, Anastacio Almeida. Imtiaz Butler MD      This note will not be viewable in [address]

## 2019-12-03 NOTE — ED NOTES
Dr. Rupert Casanova has reviewed discharge instructions with the patient. The patient verbalized understanding. Patient discharged with [facility] transport back to home in no distress on 3L NC baseline.

## 2019-12-04 ENCOUNTER — HOSPITAL ENCOUNTER (EMERGENCY)
Age: 49
Discharge: HOME | DRG: 189 | End: 2019-12-05
Attending: EMERGENCY MEDICINE
Payer: MEDICARE

## 2019-12-04 ENCOUNTER — APPOINTMENT (OUTPATIENT)
Dept: CT IMAGING | Age: 49
DRG: 189 | End: 2019-12-04
Attending: EMERGENCY MEDICINE
Payer: MEDICARE

## 2019-12-04 DIAGNOSIS — R06.02 SOB (SHORTNESS OF BREATH): ICD-10-CM

## 2019-12-04 DIAGNOSIS — E66.2 OBESITY HYPOVENTILATION SYNDROME (HCC): Primary | ICD-10-CM

## 2019-12-04 LAB
ATRIAL RATE: 81 BPM
BNP SERPL-MCNC: 272 PG/ML
CALCULATED P AXIS, ECG09: -13 DEGREES
CALCULATED R AXIS, ECG10: 69 DEGREES
CALCULATED T AXIS, ECG11: 37 DEGREES
DIAGNOSIS, 93000: NORMAL
LACTATE BLD-SCNC: 0.49 MMOL/L (ref 0.4–2)
P-R INTERVAL, ECG05: 154 MS
Q-T INTERVAL, ECG07: 384 MS
QRS DURATION, ECG06: 78 MS
QTC CALCULATION (BEZET), ECG08: 446 MS
TROPONIN I BLD-MCNC: <0.04 NG/ML (ref 0–0.08)
VENTRICULAR RATE, ECG03: 81 BPM

## 2019-12-04 PROCEDURE — 83605 ASSAY OF LACTIC ACID: CPT

## 2019-12-04 PROCEDURE — 71275 CT ANGIOGRAPHY CHEST: CPT

## 2019-12-04 PROCEDURE — 93005 ELECTROCARDIOGRAM TRACING: CPT

## 2019-12-04 PROCEDURE — 36415 COLL VENOUS BLD VENIPUNCTURE: CPT

## 2019-12-04 PROCEDURE — 77030029684 HC NEB SM VOL KT MONA -A

## 2019-12-04 PROCEDURE — 99285 EMERGENCY DEPT VISIT HI MDM: CPT

## 2019-12-04 PROCEDURE — 74011636320 HC RX REV CODE- 636/320: Performed by: EMERGENCY MEDICINE

## 2019-12-04 PROCEDURE — 83880 ASSAY OF NATRIURETIC PEPTIDE: CPT

## 2019-12-04 PROCEDURE — 84484 ASSAY OF TROPONIN QUANT: CPT

## 2019-12-04 RX ORDER — SODIUM CHLORIDE 0.9 % (FLUSH) 0.9 %
10 SYRINGE (ML) INJECTION
Status: COMPLETED | OUTPATIENT
Start: 2019-12-04 | End: 2019-12-04

## 2019-12-04 RX ADMIN — Medication 10 ML: at 21:39

## 2019-12-04 RX ADMIN — IOPAMIDOL 100 ML: 755 INJECTION, SOLUTION INTRAVENOUS at 21:39

## 2019-12-04 NOTE — ED TRIAGE NOTES
Pt reports SOB that began this morning around 8am. On 3L O2 at home. Per EMS, pt was 94% on 3L upon arrival. Placed pt on 4L for comfort. Pt's O2 95% on 3L in triage. Also reports midsternal sharp CP that began at approx 1400, worse with deep breaths.

## 2019-12-05 VITALS
RESPIRATION RATE: 16 BRPM | BODY MASS INDEX: 47.09 KG/M2 | DIASTOLIC BLOOD PRESSURE: 73 MMHG | HEART RATE: 88 BPM | HEIGHT: 66 IN | TEMPERATURE: 98 F | WEIGHT: 293 LBS | SYSTOLIC BLOOD PRESSURE: 113 MMHG | OXYGEN SATURATION: 97 %

## 2019-12-05 LAB
ATRIAL RATE: 76 BPM
CALCULATED P AXIS, ECG09: 39 DEGREES
CALCULATED R AXIS, ECG10: 65 DEGREES
CALCULATED T AXIS, ECG11: 39 DEGREES
DIAGNOSIS, 93000: NORMAL
P-R INTERVAL, ECG05: 196 MS
Q-T INTERVAL, ECG07: 384 MS
QRS DURATION, ECG06: 80 MS
QTC CALCULATION (BEZET), ECG08: 432 MS
VENTRICULAR RATE, ECG03: 76 BPM

## 2019-12-05 PROCEDURE — 74011250637 HC RX REV CODE- 250/637: Performed by: EMERGENCY MEDICINE

## 2019-12-05 RX ORDER — ONDANSETRON 4 MG/1
4 TABLET, ORALLY DISINTEGRATING ORAL
Status: COMPLETED | OUTPATIENT
Start: 2019-12-05 | End: 2019-12-05

## 2019-12-05 RX ORDER — ONDANSETRON 4 MG/1
TABLET, ORALLY DISINTEGRATING ORAL
Status: DISCONTINUED
Start: 2019-12-05 | End: 2019-12-05 | Stop reason: HOSPADM

## 2019-12-05 RX ADMIN — ONDANSETRON 4 MG: 4 TABLET, ORALLY DISINTEGRATING ORAL at 02:18

## 2019-12-05 NOTE — ED NOTES
Bedside and Verbal shift change report given to NORMA Echols (oncoming nurse) by Naun Santacruz (offgoing nurse). Report included the following information SBAR, ED Summary, Intake/Output, MAR and Recent Results. Watching tv. NAD.

## 2019-12-05 NOTE — ED PROVIDER NOTES
EMERGENCY DEPARTMENT HISTORY AND PHYSICAL EXAM      Date: 12/4/2019  Patient Name: Dc Thornton    History of Presenting Illness     Chief Complaint   Patient presents with    Shortness of Breath       History Provided By: Patient    HPI: Dc Thornton, 54 y.o. female  presents to the ED with cc of shortness of breath. Patient returns back to the emergency department for shortness of breath. She is not having any chest discomfort or pain. No cough or chest congestion. No vomiting or diarrhea. Patient has been seen multiple times in the past week or 2. She has had a normal chest x-ray. She has had a blood gas that shows a compensated respiratory acidosis and is at her baseline. She has had normal labs. No suggestion of pulmonary edema or heart failure. She states she cannot tolerate anything. She cannot tolerate her CPAP. She cannot tolerate oxygen. She cannot tolerate ambulation. There are no other complaints, changes, or physical findings at this time. PCP: Malcom Honeycutt MD    No current facility-administered medications on file prior to encounter. Current Outpatient Medications on File Prior to Encounter   Medication Sig Dispense Refill    ondansetron (ZOFRAN ODT) 8 mg disintegrating tablet Take 1 Tab by mouth every eight (8) hours as needed for Nausea. 20 Tab 0    aluminum-magnesium hydroxide (MAALOX) 200-200 mg/5 mL suspension Take 15 mL by mouth every six (6) hours as needed for Indigestion. 300 mL 0    famotidine (PEPCID) 20 mg tablet Take 1 Tab by mouth two (2) times a day for 10 days. 20 Tab 0    ibuprofen (MOTRIN) 600 mg tablet Take 1 Tab by mouth every six (6) hours as needed for Pain. 20 Tab 0    nystatin (MYCOSTATIN) topical cream Apply  to affected area two (2) times a day. 15 g 0    alum-mag hydroxide-simeth (MAALOX ADVANCED) 200-200-20 mg/5 mL susp Take 30 mL by mouth every four (4) hours as needed for Pain.  354 mL 0    ranolazine ER (RANEXA) 500 mg SR tablet Take 1 Tab by mouth two (2) times a day. 60 Tab 8    cyclobenzaprine (FLEXERIL) 5 mg tablet Take 1 Tab by mouth two (2) times daily as needed for Muscle Spasm(s). flexeriol 5 Tab 0    albuterol-ipratropium (DUO-NEB) 2.5 mg-0.5 mg/3 ml nebu 3 mL by Nebulization route four (4) times daily. 100 Nebule 1    furosemide (LASIX) 40 mg tablet Take 40 mg by mouth two (2) times a day.  fluticasone (FLONASE) 50 mcg/actuation nasal spray 2 Sprays by Both Nostrils route daily. 1 Bottle 0    insulin glargine (LANTUS) 100 unit/mL injection 45 Units by SubCUTAneous route daily.  metoprolol tartrate (LOPRESSOR) 25 mg tablet Take 25 mg by mouth two (2) times a day.  fluticasone-salmeterol (ADVAIR DISKUS) 500-50 mcg/dose diskus inhaler Take 1 Puff by inhalation every twelve (12) hours.  loratadine (CLARITIN) 10 mg tablet Take 10 mg by mouth daily.  albuterol (VENTOLIN HFA) 90 mcg/actuation inhaler Take 2 Puffs by inhalation every six (6) hours as needed for Wheezing.  aspirin 81 mg chewable tablet Take 81 mg by mouth daily.  hydrOXYzine pamoate (VISTARIL) 50 mg capsule Take 50 mg by mouth every eight (8) hours as needed for Itching.  lovastatin (MEVACOR) 40 mg tablet Take 1 Tab by mouth nightly. 30 Tab 6    glyBURIDE (DIABETA) 5 mg tablet Take 5 mg by mouth two (2) times daily (with meals).  ammonium lactate (LAC-HYDRIN) 12 % topical cream rub in to affected area well 280 g 1    nitroglycerin (NITROSTAT) 0.4 mg SL tablet 1 Tab by SubLINGual route every five (5) minutes as needed for Chest Pain (call 911 if not relieved by 3).  25 Tab 2       Past History     Past Medical History:  Past Medical History:   Diagnosis Date    Arthritis     Asthma     Breast lump     CAD (coronary artery disease)     Cancer (HCC)     breast cancer    Congestive heart failure (HCC)     COPD (chronic obstructive pulmonary disease) (HCC)     Diabetes (HCC)     Hypertension     Morbid obesity with BMI of 70 and over, adult ([facility]     NSTEMI (non-ST elevated myocardial infarction) ([facility] [facility]     SVT [facility]supraventricular tachycardia) ([facility])        Past Surgical History:  Past Surgical History:   Procedure Laterality Date    CARDIAC SURG PROCEDURE UNLIST      Stents    HX  SECTION      HX ORTHOPAEDIC      HX OTHER SURGICAL      cyst removed from back       Family History:  Family History   Problem Relation Age of Onset    Heart Disease Mother     Heart Disease Father     Heart Disease Sister     Breast Cancer Maternal Grandmother     Breast Cancer Paternal Grandmother        Social History:  Social History     Tobacco Use    Smoking status: Former Smoker     Packs/day: 0.25     Types: Cigarettes    Smokeless tobacco: Never Used    Tobacco comment: Patient states \"I  aint smoking no more d*mn cigarettes after yesterday\" 2018   Substance Use Topics    Alcohol use: No    Drug use: No       Allergies:  No Known Allergies      Review of Systems   Review of Systems   Constitutional: Negative for chills and fever. HENT: Negative for congestion, ear pain, rhinorrhea, sore throat and trouble swallowing. Eyes: Negative for visual disturbance. Respiratory: Positive for shortness of breath. Negative for cough and chest tightness. Cardiovascular: Negative for chest pain and palpitations. Gastrointestinal: Negative for abdominal pain, blood in stool, constipation, diarrhea, nausea and vomiting. Genitourinary: Negative for decreased urine volume, difficulty urinating, dysuria and frequency. Musculoskeletal: Negative for back pain and neck pain. Skin: Negative for color change and rash. Neurological: Negative for dizziness, weakness, light-headedness and headaches. Physical Exam   Physical Exam  Vitals signs and nursing note reviewed. Constitutional:       General: She is not in acute distress. Appearance: She is well-developed. She is obese. She is not ill-appearing. Eyes:      Conjunctiva/sclera: Conjunctivae normal.   Neck:      Musculoskeletal: Neck supple. Cardiovascular:      Rate and Rhythm: Normal rate and regular rhythm. Pulmonary:      Effort: Pulmonary effort is normal. No accessory muscle usage or respiratory distress. Breath sounds: Decreased breath sounds present. Abdominal:      General: There is no distension. Palpations: Abdomen is soft. Tenderness: There is no tenderness. Lymphadenopathy:      Cervical: No cervical adenopathy. Skin:     General: Skin is warm and dry. Neurological:      Mental Status: She is alert and oriented to person, place, and time. Cranial Nerves: No cranial nerve deficit. Sensory: No sensory deficit. Diagnostic Study Results     Labs -     Recent Results (from the past 24 hour(s))   EKG, 12 LEAD, INITIAL    Collection Time: 12/04/19  6:49 PM   Result Value Ref Range    Ventricular Rate 76 BPM    Atrial Rate 76 BPM    P-R Interval 196 ms    QRS Duration 80 ms    Q-T Interval 384 ms    QTC Calculation (Bezet) 432 ms    Calculated P Axis 39 degrees    Calculated R Axis 65 degrees    Calculated T Axis 39 degrees    Diagnosis       Normal sinus rhythm  Low voltage QRS  When compared with ECG of 03-DEC-2019 03:04,  No significant change was found     NT-PRO BNP    Collection Time: 12/04/19  7:46 PM   Result Value Ref Range    NT pro- (H) <125 PG/ML   POC LACTIC ACID    Collection Time: 12/04/19  7:48 PM   Result Value Ref Range    Lactic Acid (POC) 0.49 0.40 - 2.00 mmol/L   POC TROPONIN-I    Collection Time: 12/04/19  8:01 PM   Result Value Ref Range    Troponin-I (POC) <0.04 0.00 - 0.08 ng/mL       Radiologic Studies -   CTA CHEST W OR W WO CONT   Final Result   IMPRESSION:   No evidence of acute pulmonary embolus or other acute cardiopulmonary process.    Indeterminate sclerotic bone lesion at L2; given the history of breast cancer,   this finding is suspicious for metastatic disease; consider further evaluation   with nonemergent whole body bone scan. CT Results  (Last 48 hours)               12/04/19 2139  CTA CHEST W OR W WO CONT Final result    Impression:  IMPRESSION:   No evidence of acute pulmonary embolus or other acute cardiopulmonary process. Indeterminate sclerotic bone lesion at L2; given the history of breast cancer,   this finding is suspicious for metastatic disease; consider further evaluation   with nonemergent whole body bone scan. Narrative:  INDICATION: Dyspnea on exertion       COMPARISON:January 8, 2019       TECHNIQUE:     Routine noncontrast imaging the chest was performed for localization purposes. Then, following the uneventful intravenous administration of 100 cc [id number],   thin helical axial images were obtained through the chest. 3D image   postprocessing was performed. CT dose reduction was achieved through use of a   standardized protocol tailored for this examination and automatic exposure   control for dose modulation. FINDINGS:       THYROID: No nodule. MEDIASTINUM: No mass or lymphadenopathy. JAZMIN: No mass or lymphadenopathy. THORACIC AORTA: No dissection or aneurysm. PULMONARY ARTERIES: Main pulmonary artery measures 3.6 cm in diameter, not   significantly changed. No evidence of acute pulmonary emboli. TRACHEA/BRONCHI: Patent. ESOPHAGUS: No wall thickening or dilatation. HEART: Unchanged mild cardiomegaly. Moderate coronary artery calcifications. PLEURA: No effusion or pneumothorax. LUNGS: No nodule, mass, or airspace disease. INCIDENTALLY IMAGED UPPER ABDOMEN: No focal abnormality. BONES: New indeterminate sclerotic bone lesion in the L1 vertebral body. ADDITIONAL COMMENTS: N/A               CXR Results  (Last 48 hours)               12/03/19 0409  XR CHEST PORT Final result    Impression:  IMPRESSION: No evidence of acute cardiopulmonary process.            Narrative:  INDICATION: Shortness of breath COMPARISON: November 30, 2019       FINDINGS: AP portable imaging of the chest performed at 3:48 AM demonstrates a   stable cardiomediastinal silhouette. No new airspace disease is evident. No   significant osseous abnormalities are seen. Medical Decision Making   I am the first provider for this patient. I reviewed the vital signs, available nursing notes, past medical history, past surgical history, family history and social history. Vital Signs-Reviewed the patient's vital signs. Patient Vitals for the past 24 hrs:   Temp Pulse Resp BP SpO2   12/04/19 1915 98 °F (36.7 °C) 74 18 113/73 96 %   12/04/19 1900  78 22 100/66 95 %   12/04/19 1831 98.1 °F (36.7 °C)  20 126/85 95 %         Records Reviewed: Nursing Notes, Old Medical Records, Previous Radiology Studies and Previous Laboratory Studies    Provider Notes (Medical Decision Making):   Patient presents with the symptoms of shortness of breath. This is a chronic problem due to obesity related hypoventilation syndrome. There is no evidence of pulmonary edema. She has no pneumonia. There is no evidence of bronchitis. She has had normal blood gas in the past 24 hours. She is not hypoxic. There is no pulmonary emboli. She really just appears to be having a hard time tolerating anything some of the things that she cannot tolerate does not make any sense. She states she could not tolerate oxygen out of an oxygen canister but can tolerate the oxygen on the wall here in the ER. She states she cannot tolerate her CPAP at home but wants to try ours. I told her there would be no point in trying ours because she would still be going home. She feels like her oxygen at home has a bad taste. She seems to thinks that something is wrong. I told her multiple times that it is her obesity. She does not seem to want to address that issue or consider it as the root cause of her problems. ED Course:   Initial assessment performed.  The patients presenting problems have been discussed, and they are in agreement with the care plan formulated and outlined with them. I have encouraged them to ask questions as they arise throughout their visit. Orders Placed This Encounter    CTA CHEST W OR W WO CONT    NT-PRO BNP    CONTACT ISOLATION    POC TROPONIN-I    POC LACTIC ACID    POC TROPONIN-I    EKG 12 LEAD INITIAL    iopamidol (ISOVUE-370) 76 % injection 100 mL    sodium chloride (NS) flush 10 mL       EKG  Date/Time: 12/4/2019 6:49 PM  Performed by: Shahid Dia MD  Authorized by: Shahid Dia MD     ECG reviewed by ED Physician in the absence of a cardiologist: yes    Rate:     ECG rate:  76    ECG rate assessment: normal    Rhythm:     Rhythm: sinus rhythm    Ectopy:     Ectopy: none    QRS:     QRS axis:  Normal    QRS intervals:  Normal  Conduction:     Conduction: normal    ST segments:     ST segments:  Normal  T waves:     T waves: normal            Critical Care Time:   0    Disposition:  Discharge  10:28 PM    The patient's emergency department evaluation is now complete. I have reviewed all labs, imaging, and pertinent information. I have discussed all results with the patient and/or family. Based on our evaluation today I do believe that the patient is safe to be discharged home. The patient has been provided with at home instructions that are pertinent to their complaint today, although these may not be specific to the exact diagnosis. I have reviewed the patient's home medications and attempted to reconcile if not already done so by pharmacy or nursing staff. I have discussed all new prescriptions with the patient. The patient has been encouraged to follow-up with primary care doctor and/or specialist, and these have been discussed with the patient.   The patient has been advised that they may return to the emergency department if they have any worsening symptoms and or new symptoms that are of concern to them.  Verbal discharge instructions may have also been provided to the patient that may not be specifically contained in the written discharge instructions. The patient has been given opportunity to ask questions prior to discharge. PLAN:  1. Current Discharge Medication List        2. Follow-up Information     Follow up With Specialties Details Why Contact Info    Lily Bay MD General Practice Schedule an appointment as soon as possible for a visit   [address] [place] [address] [place] [address]  [phone]          Return to ED if worse     Diagnosis     Clinical Impression:   1. Obesity hypoventilation syndrome ([address]    2. SOB (shortness of breath)            This note will not be viewable in [email or web address].

## 2019-12-05 NOTE — ED NOTES
Bashir Arevalo MD reviewed discharge instructions with the patient. The patient verbalized understanding. All questions and concerns were addressed. .  Pt is alert and oriented x 4. Respirations are clear and unlabored.

## 2019-12-05 NOTE — ED NOTES
[facility] at bedside at this time    Pt allowed this nurse to get O2 pulse ox, pt 97% on 3L O2 at this time    Flow sheets updated

## 2019-12-06 ENCOUNTER — HOSPITAL ENCOUNTER (INPATIENT)
Age: 49
LOS: 2 days | Discharge: HOME | DRG: 189 | End: 2019-12-08
Attending: EMERGENCY MEDICINE | Admitting: INTERNAL MEDICINE
Payer: MEDICARE

## 2019-12-06 ENCOUNTER — APPOINTMENT (OUTPATIENT)
Dept: GENERAL RADIOLOGY | Age: 49
DRG: 189 | End: 2019-12-06
Attending: EMERGENCY MEDICINE
Payer: MEDICARE

## 2019-12-06 DIAGNOSIS — E66.01 MORBID OBESITY WITH BODY MASS INDEX OF 40.0-49.9 (HCC): ICD-10-CM

## 2019-12-06 DIAGNOSIS — E66.2 OBESITY HYPOVENTILATION SYNDROME (HCC): ICD-10-CM

## 2019-12-06 DIAGNOSIS — J96.22 ACUTE ON CHRONIC RESPIRATORY FAILURE WITH HYPERCAPNIA (HCC): Primary | ICD-10-CM

## 2019-12-06 PROBLEM — J96.92 HYPERCAPNIC RESPIRATORY FAILURE (HCC): Status: ACTIVE | Noted: 2019-12-06

## 2019-12-06 LAB
ANION GAP BLD CALC-SCNC: 6 MMOL/L (ref 10–20)
ANION GAP SERPL CALC-SCNC: 1 MMOL/L (ref 5–15)
ARTERIAL PATENCY WRIST A: ABNORMAL
ARTERIAL PATENCY WRIST A: ABNORMAL
BASE EXCESS BLDV CALC-SCNC: 15 MMOL/L
BASE EXCESS BLDV CALC-SCNC: 17 MMOL/L
BDY SITE: ABNORMAL
BDY SITE: ABNORMAL
BNP SERPL-MCNC: 547 PG/ML
BUN BLD-MCNC: 16 MG/DL (ref 9–20)
BUN SERPL-MCNC: 14 MG/DL (ref 6–20)
BUN/CREAT SERPL: 16 (ref 12–20)
CA-I BLD-MCNC: 1.29 MMOL/L (ref 1.12–1.32)
CALCIUM SERPL-MCNC: 9.8 MG/DL (ref 8.5–10.1)
CHLORIDE BLD-SCNC: 96 MMOL/L (ref 98–107)
CHLORIDE SERPL-SCNC: 100 MMOL/L (ref 97–108)
CO2 BLD-SCNC: 44 MMOL/L (ref 21–32)
CO2 SERPL-SCNC: 37 MMOL/L (ref 21–32)
CREAT BLD-MCNC: 0.9 MG/DL (ref 0.6–1.3)
CREAT SERPL-MCNC: 0.88 MG/DL (ref 0.55–1.02)
GAS FLOW.O2 O2 DELIVERY SYS: ABNORMAL L/MIN
GAS FLOW.O2 O2 DELIVERY SYS: ABNORMAL L/MIN
GAS FLOW.O2 SETTING OXYMISER: 3 L/M
GAS FLOW.O2 SETTING OXYMISER: 3 L/M
GLUCOSE BLD STRIP.AUTO-MCNC: 179 MG/DL (ref 65–100)
GLUCOSE BLD-MCNC: 113 MG/DL (ref 65–100)
GLUCOSE SERPL-MCNC: 193 MG/DL (ref 65–100)
HCO3 BLDV-SCNC: 41.3 MMOL/L (ref 23–28)
HCO3 BLDV-SCNC: 42.3 MMOL/L (ref 23–28)
HCT VFR BLD CALC: 42 % (ref 35–47)
PCO2 BLDV: 77.4 MMHG (ref 41–51)
PCO2 BLDV: 88.1 MMHG (ref 41–51)
PH BLDV: 7.28 [PH] (ref 7.32–7.42)
PH BLDV: 7.34 [PH] (ref 7.32–7.42)
PO2 BLDV: 30 MMHG (ref 25–40)
PO2 BLDV: 31 MMHG (ref 25–40)
POTASSIUM BLD-SCNC: 4.7 MMOL/L (ref 3.5–5.1)
POTASSIUM SERPL-SCNC: 4.2 MMOL/L (ref 3.5–5.1)
SAO2 % BLDV: 47 % (ref 65–88)
SAO2 % BLDV: 50 % (ref 65–88)
SERVICE CMNT-IMP: ABNORMAL
SERVICE CMNT-IMP: ABNORMAL
SODIUM BLD-SCNC: 140 MMOL/L (ref 136–145)
SODIUM SERPL-SCNC: 138 MMOL/L (ref 136–145)
SPECIMEN TYPE: ABNORMAL
SPECIMEN TYPE: ABNORMAL
TOTAL RESP. RATE, ITRR: 18
TOTAL RESP. RATE, ITRR: 23
TROPONIN I BLD-MCNC: <0.04 NG/ML (ref 0–0.08)
TROPONIN I SERPL-MCNC: <0.05 NG/ML

## 2019-12-06 PROCEDURE — 94640 AIRWAY INHALATION TREATMENT: CPT

## 2019-12-06 PROCEDURE — 94660 CPAP INITIATION&MGMT: CPT

## 2019-12-06 PROCEDURE — 74011250637 HC RX REV CODE- 250/637: Performed by: EMERGENCY MEDICINE

## 2019-12-06 PROCEDURE — 82803 BLOOD GASES ANY COMBINATION: CPT

## 2019-12-06 PROCEDURE — 71045 X-RAY EXAM CHEST 1 VIEW: CPT

## 2019-12-06 PROCEDURE — A9270 NON-COVERED ITEM OR SERVICE: HCPCS | Performed by: EMERGENCY MEDICINE

## 2019-12-06 PROCEDURE — 84484 ASSAY OF TROPONIN QUANT: CPT

## 2019-12-06 PROCEDURE — 83880 ASSAY OF NATRIURETIC PEPTIDE: CPT

## 2019-12-06 PROCEDURE — 74011000250 HC RX REV CODE- 250: Performed by: EMERGENCY MEDICINE

## 2019-12-06 PROCEDURE — 74011636637 HC RX REV CODE- 636/637: Performed by: EMERGENCY MEDICINE

## 2019-12-06 PROCEDURE — 99285 EMERGENCY DEPT VISIT HI MDM: CPT

## 2019-12-06 PROCEDURE — 36415 COLL VENOUS BLD VENIPUNCTURE: CPT

## 2019-12-06 PROCEDURE — 80048 BASIC METABOLIC PNL TOTAL CA: CPT

## 2019-12-06 PROCEDURE — 82962 GLUCOSE BLOOD TEST: CPT

## 2019-12-06 PROCEDURE — 65660000000 HC RM CCU STEPDOWN

## 2019-12-06 PROCEDURE — 80047 BASIC METABLC PNL IONIZED CA: CPT

## 2019-12-06 PROCEDURE — 93005 ELECTROCARDIOGRAM TRACING: CPT

## 2019-12-06 RX ORDER — GUAIFENESIN 100 MG/5ML
81 LIQUID (ML) ORAL DAILY
Status: DISCONTINUED | OUTPATIENT
Start: 2019-12-07 | End: 2019-12-08 | Stop reason: HOSPADM

## 2019-12-06 RX ORDER — RANOLAZINE 500 MG/1
500 TABLET, EXTENDED RELEASE ORAL 2 TIMES DAILY
Status: DISCONTINUED | OUTPATIENT
Start: 2019-12-07 | End: 2019-12-08 | Stop reason: HOSPADM

## 2019-12-06 RX ORDER — ACETAMINOPHEN 325 MG/1
650 TABLET ORAL
Status: DISCONTINUED | OUTPATIENT
Start: 2019-12-06 | End: 2019-12-08 | Stop reason: HOSPADM

## 2019-12-06 RX ORDER — GLYBURIDE 5 MG/1
5 TABLET ORAL 2 TIMES DAILY WITH MEALS
Status: DISCONTINUED | OUTPATIENT
Start: 2019-12-07 | End: 2019-12-08 | Stop reason: HOSPADM

## 2019-12-06 RX ORDER — FLUTICASONE PROPIONATE 50 MCG
2 SPRAY, SUSPENSION (ML) NASAL DAILY
Status: DISCONTINUED | OUTPATIENT
Start: 2019-12-07 | End: 2019-12-08 | Stop reason: HOSPADM

## 2019-12-06 RX ORDER — PREDNISONE 20 MG/1
40 TABLET ORAL
Status: DISCONTINUED | OUTPATIENT
Start: 2019-12-07 | End: 2019-12-08 | Stop reason: HOSPADM

## 2019-12-06 RX ORDER — HEPARIN SODIUM 5000 [USP'U]/ML
5000 INJECTION, SOLUTION INTRAVENOUS; SUBCUTANEOUS EVERY 8 HOURS
Status: DISCONTINUED | OUTPATIENT
Start: 2019-12-06 | End: 2019-12-06 | Stop reason: DRUGHIGH

## 2019-12-06 RX ORDER — METOPROLOL TARTRATE 25 MG/1
25 TABLET, FILM COATED ORAL 2 TIMES DAILY
Status: DISCONTINUED | OUTPATIENT
Start: 2019-12-07 | End: 2019-12-08 | Stop reason: HOSPADM

## 2019-12-06 RX ORDER — LORATADINE 10 MG/1
10 TABLET ORAL DAILY
Status: DISCONTINUED | OUTPATIENT
Start: 2019-12-07 | End: 2019-12-08 | Stop reason: HOSPADM

## 2019-12-06 RX ORDER — SODIUM CHLORIDE 0.9 % (FLUSH) 0.9 %
5-40 SYRINGE (ML) INJECTION EVERY 8 HOURS
Status: DISCONTINUED | OUTPATIENT
Start: 2019-12-06 | End: 2019-12-08 | Stop reason: HOSPADM

## 2019-12-06 RX ORDER — INSULIN GLARGINE 100 [IU]/ML
45 INJECTION, SOLUTION SUBCUTANEOUS DAILY
Status: DISCONTINUED | OUTPATIENT
Start: 2019-12-07 | End: 2019-12-08

## 2019-12-06 RX ORDER — IPRATROPIUM BROMIDE AND ALBUTEROL SULFATE 2.5; .5 MG/3ML; MG/3ML
3 SOLUTION RESPIRATORY (INHALATION) ONCE
Status: COMPLETED | OUTPATIENT
Start: 2019-12-06 | End: 2019-12-06

## 2019-12-06 RX ORDER — SODIUM CHLORIDE 0.9 % (FLUSH) 0.9 %
5-40 SYRINGE (ML) INJECTION AS NEEDED
Status: DISCONTINUED | OUTPATIENT
Start: 2019-12-06 | End: 2019-12-08 | Stop reason: HOSPADM

## 2019-12-06 RX ORDER — FUROSEMIDE 40 MG/1
40 TABLET ORAL 2 TIMES DAILY
Status: DISCONTINUED | OUTPATIENT
Start: 2019-12-07 | End: 2019-12-08 | Stop reason: HOSPADM

## 2019-12-06 RX ORDER — HYDROXYZINE 25 MG/1
50 TABLET, FILM COATED ORAL
Status: DISCONTINUED | OUTPATIENT
Start: 2019-12-06 | End: 2019-12-08 | Stop reason: HOSPADM

## 2019-12-06 RX ORDER — HEPARIN SODIUM 5000 [USP'U]/ML
7500 INJECTION, SOLUTION INTRAVENOUS; SUBCUTANEOUS EVERY 8 HOURS
Status: DISCONTINUED | OUTPATIENT
Start: 2019-12-06 | End: 2019-12-08 | Stop reason: HOSPADM

## 2019-12-06 RX ORDER — FLUTICASONE FUROATE AND VILANTEROL 200; 25 UG/1; UG/1
1 POWDER RESPIRATORY (INHALATION) DAILY
Status: DISCONTINUED | OUTPATIENT
Start: 2019-12-07 | End: 2019-12-08 | Stop reason: HOSPADM

## 2019-12-06 RX ORDER — IPRATROPIUM BROMIDE AND ALBUTEROL SULFATE 2.5; .5 MG/3ML; MG/3ML
3 SOLUTION RESPIRATORY (INHALATION)
Status: DISCONTINUED | OUTPATIENT
Start: 2019-12-07 | End: 2019-12-07

## 2019-12-06 RX ORDER — PREDNISONE 20 MG/1
60 TABLET ORAL
Status: COMPLETED | OUTPATIENT
Start: 2019-12-06 | End: 2019-12-06

## 2019-12-06 RX ORDER — PRAVASTATIN SODIUM 40 MG/1
40 TABLET ORAL
Status: DISCONTINUED | OUTPATIENT
Start: 2019-12-06 | End: 2019-12-08 | Stop reason: HOSPADM

## 2019-12-06 RX ORDER — BISACODYL 5 MG
5 TABLET, DELAYED RELEASE (ENTERIC COATED) ORAL DAILY PRN
Status: DISCONTINUED | OUTPATIENT
Start: 2019-12-06 | End: 2019-12-08 | Stop reason: HOSPADM

## 2019-12-06 RX ORDER — CYCLOBENZAPRINE HCL 10 MG
5 TABLET ORAL
Status: DISCONTINUED | OUTPATIENT
Start: 2019-12-06 | End: 2019-12-08 | Stop reason: HOSPADM

## 2019-12-06 RX ORDER — ACETAMINOPHEN 325 MG/1
650 TABLET ORAL
Status: COMPLETED | OUTPATIENT
Start: 2019-12-06 | End: 2019-12-06

## 2019-12-06 RX ADMIN — IPRATROPIUM BROMIDE AND ALBUTEROL SULFATE 3 ML: .5; 3 SOLUTION RESPIRATORY (INHALATION) at 17:57

## 2019-12-06 RX ADMIN — ACETAMINOPHEN 325 MG: 325 TABLET ORAL at 18:20

## 2019-12-06 RX ADMIN — IPRATROPIUM BROMIDE AND ALBUTEROL SULFATE 3 ML: .5; 3 SOLUTION RESPIRATORY (INHALATION) at 17:43

## 2019-12-06 RX ADMIN — PREDNISONE 60 MG: 20 TABLET ORAL at 18:20

## 2019-12-06 NOTE — ED TRIAGE NOTES
Pt presents to ED with EMS with complaints of feeling shaky with headache today. Pt states she feels like \"I am getting too much oxygen and its making my head hurt. \" Pt reports turning o2 down from 4 lpm to 3 lpm. EMS report she was 89% on 3lpm at home. Pt reports being d/c yesterday from here. NAD. Vss, speaking in complete sentences.

## 2019-12-07 LAB
ANION GAP SERPL CALC-SCNC: 3 MMOL/L (ref 5–15)
ATRIAL RATE: 81 BPM
BUN SERPL-MCNC: 13 MG/DL (ref 6–20)
BUN/CREAT SERPL: 18 (ref 12–20)
CALCIUM SERPL-MCNC: 9.7 MG/DL (ref 8.5–10.1)
CALCULATED P AXIS, ECG09: 44 DEGREES
CALCULATED R AXIS, ECG10: 81 DEGREES
CALCULATED T AXIS, ECG11: 43 DEGREES
CHLORIDE SERPL-SCNC: 100 MMOL/L (ref 97–108)
CO2 SERPL-SCNC: 36 MMOL/L (ref 21–32)
CREAT SERPL-MCNC: 0.73 MG/DL (ref 0.55–1.02)
DIAGNOSIS, 93000: NORMAL
ERYTHROCYTE [DISTWIDTH] IN BLOOD BY AUTOMATED COUNT: 12.7 % (ref 11.5–14.5)
GLUCOSE BLD STRIP.AUTO-MCNC: 100 MG/DL (ref 65–100)
GLUCOSE BLD STRIP.AUTO-MCNC: 184 MG/DL (ref 65–100)
GLUCOSE BLD STRIP.AUTO-MCNC: 191 MG/DL (ref 65–100)
GLUCOSE BLD STRIP.AUTO-MCNC: 224 MG/DL (ref 65–100)
GLUCOSE SERPL-MCNC: 178 MG/DL (ref 65–100)
HCT VFR BLD AUTO: 41.8 % (ref 35–47)
HGB BLD-MCNC: 12.3 G/DL (ref 11.5–16)
MCH RBC QN AUTO: 27.8 PG (ref 26–34)
MCHC RBC AUTO-ENTMCNC: 29.4 G/DL (ref 30–36.5)
MCV RBC AUTO: 94.4 FL (ref 80–99)
NRBC # BLD: 0 K/UL (ref 0–0.01)
NRBC BLD-RTO: 0 PER 100 WBC
P-R INTERVAL, ECG05: 204 MS
PLATELET # BLD AUTO: 127 K/UL (ref 150–400)
PMV BLD AUTO: 10.9 FL (ref 8.9–12.9)
POTASSIUM SERPL-SCNC: 3.9 MMOL/L (ref 3.5–5.1)
Q-T INTERVAL, ECG07: 366 MS
QRS DURATION, ECG06: 78 MS
QTC CALCULATION (BEZET), ECG08: 455 MS
RBC # BLD AUTO: 4.43 M/UL (ref 3.8–5.2)
SERVICE CMNT-IMP: ABNORMAL
SERVICE CMNT-IMP: NORMAL
SODIUM SERPL-SCNC: 139 MMOL/L (ref 136–145)
VENTRICULAR RATE, ECG03: 93 BPM
WBC # BLD AUTO: 6.2 K/UL (ref 3.6–11)

## 2019-12-07 PROCEDURE — 77030013140 HC MSK NEB VYRM -A

## 2019-12-07 PROCEDURE — 65660000000 HC RM CCU STEPDOWN

## 2019-12-07 PROCEDURE — 74011250637 HC RX REV CODE- 250/637: Performed by: INTERNAL MEDICINE

## 2019-12-07 PROCEDURE — 74011000250 HC RX REV CODE- 250: Performed by: INTERNAL MEDICINE

## 2019-12-07 PROCEDURE — 94640 AIRWAY INHALATION TREATMENT: CPT

## 2019-12-07 PROCEDURE — 82962 GLUCOSE BLOOD TEST: CPT

## 2019-12-07 PROCEDURE — 36415 COLL VENOUS BLD VENIPUNCTURE: CPT

## 2019-12-07 PROCEDURE — 74011250636 HC RX REV CODE- 250/636: Performed by: INTERNAL MEDICINE

## 2019-12-07 PROCEDURE — 77010033678 HC OXYGEN DAILY

## 2019-12-07 PROCEDURE — 74011636637 HC RX REV CODE- 636/637: Performed by: INTERNAL MEDICINE

## 2019-12-07 PROCEDURE — 77030012879 HC MSK CPAP FLL FAC PHIL -B

## 2019-12-07 PROCEDURE — 80048 BASIC METABOLIC PNL TOTAL CA: CPT

## 2019-12-07 PROCEDURE — 85027 COMPLETE CBC AUTOMATED: CPT

## 2019-12-07 RX ORDER — IPRATROPIUM BROMIDE AND ALBUTEROL SULFATE 2.5; .5 MG/3ML; MG/3ML
3 SOLUTION RESPIRATORY (INHALATION)
Status: DISCONTINUED | OUTPATIENT
Start: 2019-12-07 | End: 2019-12-08 | Stop reason: HOSPADM

## 2019-12-07 RX ORDER — OMEPRAZOLE 40 MG/1
40 CAPSULE, DELAYED RELEASE ORAL DAILY
COMMUNITY

## 2019-12-07 RX ORDER — OXYCODONE HYDROCHLORIDE 5 MG/1
5 TABLET ORAL
COMMUNITY
End: 2020-01-29

## 2019-12-07 RX ORDER — POTASSIUM CHLORIDE 750 MG/1
10 TABLET, FILM COATED, EXTENDED RELEASE ORAL 4 TIMES DAILY
COMMUNITY
End: 2020-08-17

## 2019-12-07 RX ORDER — NYSTATIN 100000 [USP'U]/G
POWDER TOPICAL 2 TIMES DAILY
Status: DISCONTINUED | OUTPATIENT
Start: 2019-12-07 | End: 2019-12-08 | Stop reason: HOSPADM

## 2019-12-07 RX ADMIN — HEPARIN SODIUM 7500 UNITS: 5000 INJECTION INTRAVENOUS; SUBCUTANEOUS at 14:36

## 2019-12-07 RX ADMIN — Medication 10 ML: at 21:21

## 2019-12-07 RX ADMIN — LORATADINE 10 MG: 10 TABLET ORAL at 08:48

## 2019-12-07 RX ADMIN — HEPARIN SODIUM 7500 UNITS: 5000 INJECTION INTRAVENOUS; SUBCUTANEOUS at 07:54

## 2019-12-07 RX ADMIN — HEPARIN SODIUM 7500 UNITS: 5000 INJECTION INTRAVENOUS; SUBCUTANEOUS at 23:24

## 2019-12-07 RX ADMIN — METOPROLOL TARTRATE 25 MG: 25 TABLET ORAL at 08:48

## 2019-12-07 RX ADMIN — GLYBURIDE 5 MG: 5 TABLET ORAL at 08:03

## 2019-12-07 RX ADMIN — PRAVASTATIN SODIUM 40 MG: 40 TABLET ORAL at 01:32

## 2019-12-07 RX ADMIN — RANOLAZINE 500 MG: 500 TABLET, FILM COATED, EXTENDED RELEASE ORAL at 18:40

## 2019-12-07 RX ADMIN — CYCLOBENZAPRINE 5 MG: 10 TABLET, FILM COATED ORAL at 21:21

## 2019-12-07 RX ADMIN — CYCLOBENZAPRINE 5 MG: 10 TABLET, FILM COATED ORAL at 08:01

## 2019-12-07 RX ADMIN — FUROSEMIDE 40 MG: 40 TABLET ORAL at 18:40

## 2019-12-07 RX ADMIN — IPRATROPIUM BROMIDE AND ALBUTEROL SULFATE 3 ML: .5; 3 SOLUTION RESPIRATORY (INHALATION) at 01:42

## 2019-12-07 RX ADMIN — IPRATROPIUM BROMIDE AND ALBUTEROL SULFATE 3 ML: .5; 3 SOLUTION RESPIRATORY (INHALATION) at 07:48

## 2019-12-07 RX ADMIN — HEPARIN SODIUM 7500 UNITS: 5000 INJECTION INTRAVENOUS; SUBCUTANEOUS at 01:32

## 2019-12-07 RX ADMIN — IPRATROPIUM BROMIDE AND ALBUTEROL SULFATE 3 ML: .5; 3 SOLUTION RESPIRATORY (INHALATION) at 14:16

## 2019-12-07 RX ADMIN — PREDNISONE 40 MG: 20 TABLET ORAL at 07:54

## 2019-12-07 RX ADMIN — PRAVASTATIN SODIUM 40 MG: 40 TABLET ORAL at 21:21

## 2019-12-07 RX ADMIN — GLYBURIDE 5 MG: 5 TABLET ORAL at 18:39

## 2019-12-07 RX ADMIN — METOPROLOL TARTRATE 25 MG: 25 TABLET ORAL at 18:40

## 2019-12-07 RX ADMIN — NYSTATIN: 100000 POWDER TOPICAL at 18:39

## 2019-12-07 RX ADMIN — Medication 10 ML: at 08:49

## 2019-12-07 RX ADMIN — INSULIN GLARGINE 45 UNITS: 100 INJECTION, SOLUTION SUBCUTANEOUS at 08:47

## 2019-12-07 RX ADMIN — ASPIRIN 81 MG 81 MG: 81 TABLET ORAL at 08:48

## 2019-12-07 RX ADMIN — FLUTICASONE PROPIONATE 2 SPRAY: 50 SPRAY, METERED NASAL at 08:04

## 2019-12-07 RX ADMIN — FLUTICASONE FUROATE AND VILANTEROL TRIFENATATE 1 PUFF: 200; 25 POWDER RESPIRATORY (INHALATION) at 08:03

## 2019-12-07 RX ADMIN — RANOLAZINE 500 MG: 500 TABLET, FILM COATED, EXTENDED RELEASE ORAL at 08:03

## 2019-12-07 RX ADMIN — IPRATROPIUM BROMIDE AND ALBUTEROL SULFATE 3 ML: .5; 3 SOLUTION RESPIRATORY (INHALATION) at 19:54

## 2019-12-07 RX ADMIN — Medication 10 ML: at 14:00

## 2019-12-07 RX ADMIN — FUROSEMIDE 40 MG: 40 TABLET ORAL at 08:48

## 2019-12-07 NOTE — PROGRESS NOTES
Hospitalist Progress Note    NAME: Tano Goodson   :  1970   MRN:  [id number]     Assessment / Plan:  Acute on chronic hypercapnic respiratory failure POA (Baseline supposed to be on Trilogy and 3L O2)  Due to intolerance to her trilogy and home O2 at home in settings of Morbid Obesity Hypoventilation Syndrome and ? COPD exacerbation  BiPAP at night and PRN  Pulmonary Consult as I am not sure what to do in this sad situation with intolerance to home devices and she had been to ED multiple times for the same and now admitted  Scheduled nebs  Continue Steroids     Chronic Congestive heart failure, cannot assess whether systolic or diastolic on recent 2D echo  BNP slightly elevated but patient with baseline edema, no pulmonary edema or effusion on CXR  Continue Lasix     Diabetes mellitus  Continue Lantus Glyburide  Titrate PRN  SSI     CAD  No active symptoms, negative troponin     Arthritis  Super Morbid obesity with Body mass index is 69.24 kg/m².     Code Status: Full  Surrogate Decision Maker: Rosa Quevedo     DVT Prophylaxis: heparin SQ  GI Prophylaxis: not indicated                 Subjective: Pt seen and examined at bedside. NAD. Overnight events d/w RN   CHIEF COMPLAINT: f/u \"shortness of breath\"    Review of Systems:  Symptom Y/N Comments  Symptom Y/N Comments   Fever/Chills n   Chest Pain n    Poor Appetite    Edema     Cough y chronic  Abdominal Pain n    Sputum    Joint Pain     SOB/RAZO y   Pruritis/Rash     Nausea/vomit    Tolerating PT/OT     Diarrhea    Tolerating Diet y    Constipation    Other       Could NOT obtain due to:      Objective:     VITALS:   Last 24hrs VS reviewed since prior progress note.  Most recent are:  Patient Vitals for the past 24 hrs:   Temp Pulse Resp BP SpO2   19 0802     97 %   19 0748     96 %   19 0700 97.6 °F (36.4 °C) 71 21 108/57 94 %   19 0615  72   92 %   19 0600  70  109/61 94 %   19 0545  72   93 % 12/07/19 0530  70   94 %   12/07/19 0515  71   93 %   12/07/19 0500  71  109/56 93 %   12/07/19 0400 98.5 °F (36.9 °C) 72 17 104/65 92 %   12/07/19 0300  76  116/59 93 %   12/07/19 0200  77  100/58 93 %   12/07/19 0100  84 20 119/79 (!) 89 %   12/06/19 2300  99 23 (!) 144/94 90 %   12/06/19 2200  83 18 124/85 90 %   12/06/19 2100  84 21 107/73 92 %   12/06/19 2000  84 19 104/47 90 %   12/06/19 1900  92 16 112/79 93 %   12/06/19 1800  77 19 136/66 98 %   12/06/19 1757     97 %   12/06/19 1743     96 %   12/06/19 1712 97.5 °F (36.4 °C) 80 20 140/71 97 %       Intake/Output Summary (Last 24 hours) at 12/7/2019 0813  Last data filed at 12/7/2019 0742  Gross per 24 hour   Intake    Output 350 ml   Net -350 ml        PHYSICAL EXAM:  General: WD, WN. Alert, cooperative, no acute distress    EENT:  EOMI. Anicteric sclerae. MMM  Resp:  CTA bilaterally, no wheezing or rales. No accessory muscle use  CV:  Regular  rhythm,  + edema  GI:  Soft, Non distended, Non tender.  +Bowel sounds  Neurologic:  Alert and oriented X 3, normal speech,   Psych:   Good insight. Not anxious nor agitated  Skin:  No rashes. No jaundice    Reviewed most current lab test results and cultures  YES  Reviewed most current radiology test results   YES  Review and summation of old records today    NO  Reviewed patient's current orders and MAR    YES  PMH/SH reviewed - no change compared to H&P  ________________________________________________________________________  Care Plan discussed with:    Comments   Patient y    Family      RN y    Care Manager     Consultant                        Multidiciplinary team rounds were held today with , nursing, pharmacist and clinical coordinator. Patient's plan of care was discussed; medications were reviewed and discharge planning was addressed.      ________________________________________________________________________  Total NON critical care TIME:  35 Minutes    Total CRITICAL CARE TIME Spent:   Minutes non procedure based      Comments   >50% of visit spent in counseling and coordination of care     ________________________________________________________________________  Nakia Kennedy MD     Procedures: see electronic medical records for all procedures/Xrays and details which were not copied into this note but were reviewed prior to creation of Plan. LABS:  I reviewed today's most current labs and imaging studies.   Pertinent labs include:  Recent Labs     12/07/19  0408   WBC 6.2   HGB 12.3   HCT 41.8   *     Recent Labs     12/07/19  0408 12/06/19  2111    138   K 3.9 4.2    100   CO2 36* 37*   * 193*   BUN 13 14   CREA 0.73 0.88   CA 9.7 9.8       Signed: Nakia Kennedy MD

## 2019-12-07 NOTE — PROGRESS NOTES
Primary Nurse Brenda Vizcaino and NORMA Booker performed a dual skin assessment on this patient Impairment noted- see wound doc flow sheet  Jeffrey score is 20    Patient has extremely dry, crusted, thick areas on lower legs and feet

## 2019-12-07 NOTE — PROGRESS NOTES
Pharmacy Clarification of the Prior to Admission Medication Regimen Retrospective to the Admission Medication Reconciliation    The patient was interviewed regarding clarification of the prior to admission medication regimen and was questioned regarding use of any other inhalers, topical products, over the counter medications, herbal medications, vitamin products or ophthalmic/nasal/otic medication use. Patient stated she could only take Roxicodone but did not know the dose. MHT called [facility]ae [facility], [phone], and talked to Romana Filbert, technician, who confirmed the dose. Information Obtained From: Patient, Rx Query, Outpatient Pharmacy    Recommendations/Findings: The following amendments were made to the patient's active medication list on file at [facility]:     1) Additions:   oxyCODONE IR (ROXICODONE) 5 mg   omeprazole (PRILOSEC) 40 mg capsule  potassium chloride SR (KLOR-CON 10) 10 mEq tablet    2) Removals:   Maalox  Famotidine  Ibuprofen  Zofran    3) Changes:  fluticasone-salmeterol (ADVAIR DISKUS) 500-50  (Old regimen: Inhale BD /New regimen: Inhale Daily)      4) Pertinent Pharmacy Findings:  oxyCODONE IR (ROXICODONE) 5 mg immediate release tablet: Patient stated can only take roxicodone. Patient did not know the dose of mediation so MHT called Linette and the medication has not been picked up since July. PTA medication list was corrected to the following:     Prior to Admission Medications   Prescriptions Last Dose Informant Taking? albuterol (VENTOLIN HFA) 90 mcg/actuation inhaler 12/6/2019 at Unknown time Self Yes   Sig: Take 2 Puffs by inhalation every six (6) hours as needed for Wheezing. albuterol-ipratropium (DUO-NEB) 2.5 mg-0.5 mg/3 ml nebu 12/4/2019 at Unknown time Self Yes   Sig: 3 mL by Nebulization route four (4) times daily.    ammonium lactate (LAC-HYDRIN) 12 % topical cream 12/3/2019 Self No   Sig: rub in to affected area well   aspirin 81 mg chewable tablet 12/6/2019 at Unknown time Self Yes   Sig: Take 81 mg by mouth daily. cyclobenzaprine (FLEXERIL) 5 mg tablet 2019 at Unknown time Self Yes   Sig: Take 1 Tab by mouth two (2) times daily as needed for Muscle Spasm(s). flexeriol   fluticasone (FLONASE) 50 mcg/actuation nasal spray 2019 Self No   Si Sprays by Both Nostrils route daily. fluticasone-salmeterol (ADVAIR DISKUS) 500-50 mcg/dose diskus inhaler 2019 Self No   Sig: Take 1 Puff by inhalation daily. furosemide (LASIX) 40 mg tablet 2019 at Unknown time Self Yes   Sig: Take 40 mg by mouth two (2) times a day. glyBURIDE (DIABETA) 5 mg tablet 2019 at Unknown time Self Yes   Sig: Take 5 mg by mouth two (2) times daily (with meals). hydrOXYzine pamoate (VISTARIL) 50 mg capsule 2019 at Unknown time Self Yes   Sig: Take 50 mg by mouth every eight (8) hours as needed for Itching. insulin glargine (LANTUS) 100 unit/mL injection 2019 Self No   Si Units by SubCUTAneous route daily. loratadine (CLARITIN) 10 mg tablet 2019 Self No   Sig: Take 10 mg by mouth daily. lovastatin (MEVACOR) 40 mg tablet 2019 at Unknown time Self Yes   Sig: Take 1 Tab by mouth nightly. metoprolol tartrate (LOPRESSOR) 25 mg tablet 2019 at Unknown time Self Yes   Sig: Take 25 mg by mouth two (2) times a day. nitroglycerin (NITROSTAT) 0.4 mg SL tablet Not Taking at Unknown time Self No   Si Tab by SubLINGual route every five (5) minutes as needed for Chest Pain (call 911 if not relieved by 3). nystatin (MYCOSTATIN) topical cream 2019 Self No   Sig: Apply  to affected area two (2) times a day. omeprazole (PRILOSEC) 40 mg capsule 2019 at Unknown time Self Yes   Sig: Take 40 mg by mouth daily.    oxyCODONE IR (ROXICODONE) 5 mg immediate release tablet 2019 at Unknown time Self Yes   Sig: Take 5 mg by mouth every four (4) hours as needed for Pain.   potassium chloride SR (KLOR-CON 10) 10 mEq tablet 2019 at Unknown time Self Yes Sig: Take 10 mEq by mouth four (4) times daily. ranolazine ER (RANEXA) 500 mg SR tablet 11/30/2019 at Unknown time Self Yes   Sig: Take 1 Tab by mouth two (2) times a day. Facility-Administered Medications: None          Thank you,  Violetta Bryson  Pharm D.  Candidate Class of 2021

## 2019-12-07 NOTE — ED PROVIDER NOTES
EMERGENCY DEPARTMENT HISTORY AND PHYSICAL EXAM      Date: 12/6/2019  Patient Name: Afia Pollack    History of Presenting Illness     Chief Complaint   Patient presents with    Headache     headache and shaky today       History Provided By: Patient    HPI: Afia Pollack, 54 y.o. female with PMHx as noted below presents the emergency department complaining of \"I need BiPAP\" patient reportedly called EMS today because her oxygen was \"bothering me\" so she had taken it off. Patient notes that since removing her oxygen has had a headache. She was reportedly hypoxic per EMS so was placed on a nonrebreather and brought to the emergency department. Patient otherwise reports her baseline shortness of breath nonproductive cough. She also notes persistent headache since removing her oxygen earlier today.   History is extremely limited secondary to the patient being a very poor historian      PCP: Bon Anderson MD    Current Facility-Administered Medications   Medication Dose Route Frequency Provider Last Rate Last Dose    albuterol-ipratropium (DUO-NEB) 2.5 MG-0.5 MG/3 ML  3 mL Nebulization TID RT Suad Crump MD        albuterol-ipratropium (DUO-NEB) 2.5 MG-0.5 MG/3 ML  3 mL Nebulization Q6H PRN Suad Crump MD        acetaminophen (TYLENOL) tablet 650 mg  650 mg Oral Q6H PRN Shirley Kearns MD        sodium chloride (NS) flush 5-40 mL  5-40 mL IntraVENous Q8H Suad Crump MD   10 mL at 12/07/19 0849    sodium chloride (NS) flush 5-40 mL  5-40 mL IntraVENous PRN Suad Crump MD        bisacodyl (DULCOLAX) tablet 5 mg  5 mg Oral DAILY PRN Suad Crump MD        aluminum-magnesium hydroxide (MAALOX) oral suspension 15 mL  15 mL Oral Q6H PRN Suad Crump MD        aspirin chewable tablet 81 mg  81 mg Oral DAILY Suad Crump MD   81 mg at 12/07/19 0848    cyclobenzaprine (FLEXERIL) tablet 5 mg  5 mg Oral BID PRN Suad Crump MD   5 mg at 12/07/19 0801    fluticasone propionate (FLONASE) 50 mcg/actuation nasal spray 2 Spray  2 Spray Both Nostrils DAILY Marla Mohamud MD   2 [place] at 12/07/19 0804    fluticasone-vilanterol (BREO ELLIPTA) 200mcg-25mcg/puff  1 Puff Inhalation DAILY Marla Mohamud MD   1 Puff at 12/07/19 0803    furosemide (LASIX) tablet 40 mg  40 mg Oral BID Marla Mohamud MD   40 mg at 12/07/19 0848    glyBURIDE (DIABETA) tablet 5 mg  5 mg Oral BID WITH MEALS Marla Mohamud MD   5 mg at 12/07/19 9537    hydrOXYzine HCl (ATARAX) tablet 50 mg  50 mg Oral Q8H PRN Marla Mohamud MD        insulin glargine (LANTUS) injection 45 Units  45 Units SubCUTAneous DAILY Marla Mohamud MD   45 Units at 12/07/19 0847    loratadine (CLARITIN) tablet 10 mg  10 mg Oral DAILY Marla Mohamud MD   10 mg at 12/07/19 0848    pravastatin (PRAVACHOL) tablet 40 mg  40 mg Oral QHS Marla Mohamud MD   40 mg at 12/07/19 0132    metoprolol tartrate (LOPRESSOR) tablet 25 mg  25 mg Oral BID Marla Mohamud MD   25 mg at 12/07/19 0848    ranolazine ER (RANEXA) tablet 500 mg  500 mg Oral BID Marla Mohamud MD   500 mg at 12/07/19 0803    predniSONE (DELTASONE) tablet 40 mg  40 mg Oral DAILY WITH Zarina Doran MD   40 mg at 12/07/19 0754    heparin (porcine) injection 7,500 Units  7,500 Units SubCUTAneous Q8H Marla Mohamud MD   7,500 Units at 12/07/19 0754     Current Outpatient Medications   Medication Sig Dispense Refill    ondansetron (ZOFRAN ODT) 8 mg disintegrating tablet Take 1 Tab by mouth every eight (8) hours as needed for Nausea. 20 Tab 0    aluminum-magnesium hydroxide (MAALOX) 200-200 mg/5 mL suspension Take 15 mL by mouth every six (6) hours as needed for Indigestion. 300 mL 0    famotidine (PEPCID) 20 mg tablet Take 1 Tab by mouth two (2) times a day for 10 days. 20 Tab 0    ibuprofen (MOTRIN) 600 mg tablet Take 1 Tab by mouth every six (6) hours as needed for Pain.  20 Tab 0    nystatin (MYCOSTATIN) topical cream Apply  to affected area two (2) times a day. 15 g 0    alum-mag hydroxide-simeth (MAALOX ADVANCED) 200-200-20 mg/5 mL susp Take 30 mL by mouth every four (4) hours as needed for Pain. 354 mL 0    ranolazine ER (RANEXA) 500 mg SR tablet Take 1 Tab by mouth two (2) times a day. 60 Tab 8    cyclobenzaprine (FLEXERIL) 5 mg tablet Take 1 Tab by mouth two (2) times daily as needed for Muscle Spasm(s). flexeriol 5 Tab 0    albuterol-ipratropium (DUO-NEB) 2.5 mg-0.5 mg/3 ml nebu 3 mL by Nebulization route four (4) times daily. 100 Nebule 1    furosemide (LASIX) 40 mg tablet Take 40 mg by mouth two (2) times a day.  fluticasone (FLONASE) 50 mcg/actuation nasal spray 2 Sprays by Both Nostrils route daily. 1 Bottle 0    insulin glargine (LANTUS) 100 unit/mL injection 45 Units by SubCUTAneous route daily.  metoprolol tartrate (LOPRESSOR) 25 mg tablet Take 25 mg by mouth two (2) times a day.  fluticasone-salmeterol (ADVAIR DISKUS) 500-50 mcg/dose diskus inhaler Take 1 Puff by inhalation every twelve (12) hours.  loratadine (CLARITIN) 10 mg tablet Take 10 mg by mouth daily.  albuterol (VENTOLIN HFA) 90 mcg/actuation inhaler Take 2 Puffs by inhalation every six (6) hours as needed for Wheezing.  aspirin 81 mg chewable tablet Take 81 mg by mouth daily.  hydrOXYzine pamoate (VISTARIL) 50 mg capsule Take 50 mg by mouth every eight (8) hours as needed for Itching.  lovastatin (MEVACOR) 40 mg tablet Take 1 Tab by mouth nightly. 30 Tab 6    glyBURIDE (DIABETA) 5 mg tablet Take 5 mg by mouth two (2) times daily (with meals).  ammonium lactate (LAC-HYDRIN) 12 % topical cream rub in to affected area well 280 g 1    nitroglycerin (NITROSTAT) 0.4 mg SL tablet 1 Tab by SubLINGual route every five (5) minutes as needed for Chest Pain (call 911 if not relieved by 3).  25 Tab 2       Past History     Past Medical History:  Past Medical History:   Diagnosis Date    Arthritis     Asthma  Breast lump     CAD (coronary artery disease)     Cancer (HCC)     breast cancer    Congestive heart failure (HCC)     COPD (chronic obstructive pulmonary disease) (HCC)     Diabetes ([facility]     Hypertension     Morbid obesity with BMI of 70 and over, adult ([facility]     NSTEMI (non-ST elevated myocardial infarction) ([facility]     SVT (supraventricular tachycardia) ([facility])        Past Surgical History:  Past Surgical History:   Procedure Laterality Date    CARDIAC SURG PROCEDURE UNLIST      Stents    HX  SECTION      HX ORTHOPAEDIC      HX OTHER SURGICAL      cyst removed from back       Family History:  Family History   Problem Relation Age of Onset    Heart Disease Mother     Heart Disease Father     Heart Disease Sister     Breast Cancer Maternal Grandmother     Breast Cancer Paternal Grandmother        Social History:  Social History     Tobacco Use    Smoking status: Former Smoker     Packs/day: 0.25     Types: Cigarettes    Smokeless tobacco: Never Used    Tobacco comment: Patient states \"I  aint smoking no more d*mn cigarettes after yesterday\" 2018   Substance Use Topics    Alcohol use: No    Drug use: No       Allergies:  No Known Allergies      Review of Systems   Review of Systems  Constitutional: Negative for fever, chills, and fatigue. HENT: Negative for congestion, sore throat, rhinorrhea, sneezing and neck stiffness   Eyes: Negative for discharge and redness. Respiratory: Positive for  shortness of breath, wheezing   Cardiovascular: Negative for chest pain, palpitations   Gastrointestinal: Negative for nausea, vomiting, abdominal pain, constipation, diarrhea and blood in stool. Genitourinary: Negative for dysuria, urgency, frequency, hematuria, flank pain, decreased urine volume, discharge,   Musculoskeletal: Negative for myalgias or joint pain . Skin: Negative for rash or lesions . Neurological: Negative weakness, light-headedness, numbness. Positive headaches. Physical Exam   Physical Exam    GENERAL: alert and oriented, no acute distress  EYES: PEERL, No injection, discharge or icterus. ENT: Mucous membranes pink and moist.  NECK: Supple  LUNGS: Airway patent. Limited by body habitus, breath sounds diminished bilaterally no audible wheezes or crackles. HEART: Regular rate and rhythm. No peripheral edema  ABDOMEN: Morbidly obese and non-tender, without guarding or rebound. SKIN:  warm, dry  EXTREMITIES: Chronic venous stasis changes/dermatitis of the lower extremities  NEUROLOGICAL: Alert, oriented      Diagnostic Study Results     Labs -     Recent Results (from the past 12 hour(s))   METABOLIC PANEL, BASIC    Collection Time: 12/07/19  4:08 AM   Result Value Ref Range    Sodium 139 136 - 145 mmol/L    Potassium 3.9 3.5 - 5.1 mmol/L    Chloride 100 97 - 108 mmol/L    CO2 36 (H) 21 - 32 mmol/L    Anion gap 3 (L) 5 - 15 mmol/L    Glucose 178 (H) 65 - 100 mg/dL    BUN 13 6 - 20 MG/DL    Creatinine 0.73 0.55 - 1.02 MG/DL    BUN/Creatinine ratio 18 12 - 20      GFR est AA >60 >60 ml/min/1.73m2    GFR est non-AA >60 >60 ml/min/1.73m2    Calcium 9.7 8.5 - 10.1 MG/DL   CBC W/O DIFF    Collection Time: 12/07/19  4:08 AM   Result Value Ref Range    WBC 6.2 3.6 - 11.0 K/uL    RBC 4.43 3.80 - 5.20 M/uL    HGB 12.3 11.5 - 16.0 g/dL    HCT 41.8 35.0 - 47.0 %    MCV 94.4 80.0 - 99.0 FL    MCH 27.8 26.0 - 34.0 PG    MCHC 29.4 (L) 30.0 - 36.5 g/dL    RDW 12.7 11.5 - 14.5 %    PLATELET 546 (L) 028 - 400 K/uL    MPV 10.9 8.9 - 12.9 FL    NRBC 0.0 0  WBC    ABSOLUTE NRBC 0.00 0.00 - 0.01 K/uL   GLUCOSE, POC    Collection Time: 12/07/19  7:53 AM   Result Value Ref Range    Glucose (POC) 100 65 - 100 mg/dL    Performed by Timo Fleming \"Ashlee\"        Radiologic Studies -   XR CHEST PORT   Final Result   IMPRESSION: No acute disease or change.            CT Results  (Last 48 hours)    None        CXR Results  (Last 48 hours)               12/06/19 1806  XR CHEST PORT Final result    Impression:  IMPRESSION: No acute disease or change. Narrative:  EXAM: Portable CXR. 1743 hours. COMPARISON: Chest CTA 12/4/2019. INDICATION: SOB       FINDINGS:   The lungs are clear. Heart is normal in size. There is no overt pulmonary edema. There is no evident pneumothorax, adenopathy or pleural effusion. Medical Decision Making   I am the first provider for this patient. I reviewed the vital signs, available nursing notes, past medical history, past surgical history, family history and social history. Vital Signs-Reviewed the patient's vital signs. Patient Vitals for the past 12 hrs:   Temp Pulse Resp BP SpO2   12/07/19 0802     97 %   12/07/19 0748     96 %   12/07/19 0700 97.6 °F (36.4 °C) 71 21 108/57 94 %   12/07/19 0615  72   92 %   12/07/19 0600  70  109/61 94 %   12/07/19 0545  72   93 %   12/07/19 0530  70   94 %   12/07/19 0515  71   93 %   12/07/19 0500  71  109/56 93 %   12/07/19 0400 98.5 °F (36.9 °C) 72 17 104/65 92 %   12/07/19 0300  76  116/59 93 %   12/07/19 0200  77  100/58 93 %   12/07/19 0100  84 20 119/79 (!) 89 %   12/06/19 2300  99 23 (!) 144/94 90 %   12/06/19 2200  83 18 124/85 90 %       EKG interpretation: (Preliminary)  Rhythm: normal sinus rhythm; and regular . Rate (approx.): 93; Axis: normal; P wave: normal; QRS interval: normal ; ST/T wave: normal;  was interpreted by Emily Dyson MD,ED Provider. Records Reviewed: Nursing Notes and Old Medical Records    Provider Notes (Medical Decision Making): On presentation, the patient is well appearing, in no acute distress with normal vital signs. Based on my history and exam the differential diagnosis for this patient includes COPD exacerbation, pneumonia, respiratory failure, obesity hypoventilation syndrome, URI. Work-up notable for respiratory acidosis with CO2 in the 80s. This is likely the cause of the patient's headache. Patient given his steroids, nebs with only mild improvement in her CO2 so was placed on BiPAP. Will plan to admit for further management. ED Course:   Initial assessment performed. The patients presenting problems have been discussed, and they are in agreement with the care plan formulated and outlined with them. I have encouraged them to ask questions as they arise throughout their visit. PROGRESS:  The patient has been re-evaluated and sx have improved. Reviewed available results with patient and have counseled them on diagnosis and care plan. They have expressed understanding, and all their questions have been answered. They agree with plan for admission. CONSULT:  Artem Sol MD spoke with the hospitalist.  Discussed HPI and PE, available diagnostic tests and clinical findings. He is in agreement with care plans as outlined and will evaluate for admission     Admit Note  Patient is being admitted to the hospitalist.  The results of their tests and reasons for their admission have been discussed with them and/or available family. They convey agreement and understanding for the need to be admitted and for their admission diagnosis. Consultation has been made with the inpatient physician specialist for hospitalization. Critical Care Note      IMPENDING DETERIORATION -Respiratory, CNS and Metabolic  ASSOCIATED RISK FACTORS - acidosis   MANAGEMENT- Bedside Assessment and Supervision of Care  INTERPRETATION -  Xrays, Blood Gases and Blood Pressure  INTERVENTIONS - BiPAP  CASE REVIEW - Hospitalist  TREATMENT RESPONSE -Improved  PERFORMED BY - Self    NOTES   :  I have provided a total of 35 minutes of critical time. The reason for providing this level of medical care for this critically ill patient was due to a critical illness that impaired one or more vital organ systems such that there was a high probability of imminent or life threatening deterioration in the patients condition.  This care involved high complexity decision making to assess, manipulate, and support vital system functions,  lab review, consultations with specialist, family decision- making, bedside attention and documentation. During this entire length of time I was immediately available to the patient    Disposition:  admission    PLAN:  1. Admit     Diagnosis     Clinical Impression:   1. Acute on chronic respiratory failure with hypercapnia (HCC)    2. Morbid obesity with body mass index of 40.0-49.9 (HCC)    3. Obesity hypoventilation syndrome ([address]        Please note that this dictation was completed with Dragon, computer voice recognition software. Quite often unanticipated grammatical, syntax, homophones, and other interpretive errors are inadvertently transcribed by the computer software. Please disregard these errors. Additionally, please excuse any errors that have escaped final proofreading.

## 2019-12-07 NOTE — ROUTINE PROCESS
TRANSFER - OUT REPORT: 
 
Verbal report given to Amrita(name) on Yahaira Sánchez  being transferred to PCU stepdown(unit) for routine progression of care Report consisted of patients Situation, Background, Assessment and  
Recommendations(SBAR). Information from the following report(s) SBAR, Kardex, ED Summary, Procedure Summary, Intake/Output, MAR, Accordion, Recent Results, Med Rec Status and Cardiac Rhythm NSR was reviewed with the receiving nurse. Lines:  
Peripheral IV 12/06/19 Left Antecubital (Active) Site Assessment Clean, dry, & intact 12/7/2019 11:24 AM  
Phlebitis Assessment 0 12/7/2019 11:24 AM  
Infiltration Assessment 0 12/7/2019 11:24 AM  
Dressing Status Clean, dry, & intact 12/7/2019 11:24 AM  
Dressing Type Transparent 12/7/2019 11:24 AM  
  
 
Opportunity for questions and clarification was provided. Patient transported with: 
 Monitor O2 @ 4lpm liters Registered Nurse

## 2019-12-07 NOTE — PROGRESS NOTES
Problem: Falls - Risk of  Goal: *Absence of Falls  Description  Document Markus Blackmon Fall Risk and appropriate interventions in the flowsheet.   Outcome: Progressing Towards Goal  Note: Fall Risk Interventions:            Medication Interventions: Evaluate medications/consider consulting pharmacy, Patient to call before getting OOB, Teach patient to arise slowly    Elimination Interventions: Call light in reach, Patient to call for help with toileting needs, Toileting schedule/hourly rounds, Urinal in reach

## 2019-12-07 NOTE — ED NOTES
Pt is sitting on side of bed eating  She asked to go from the 3lpm to the 4lpm nasal cannula while sitting on side of bed, although sats 97%

## 2019-12-07 NOTE — PROGRESS NOTES
1130: TRANSFER - IN REPORT:    Verbal report received from NORMA Martins(name) on Marissa Ferguson  being received from Emergency Department(unit) for routine progression of care      Report consisted of patients Situation, Background, Assessment and   Recommendations(SBAR). Information from the following report(s) SBAR, Kardex, ED Summary, Intake/Output and MAR was reviewed with the receiving nurse. Opportunity for questions and clarification was provided. Assessment completed upon patients arrival to unit and care assumed. 1203: Patient arrived on floor. Patient ambulated to bed. Patient on 4L NC, resting quietly. Call bell in reach. 1900: Bedside shift change report given to Ragini Oliver (oncoming nurse) by Jaun (offgoing nurse). Report included the following information SBAR, Kardex, Intake/Output and MAR.

## 2019-12-07 NOTE — H&P
Hospitalist Admission Note    NAME: Rosamaria Kingston   :  1970   MRN:  [id number]     Date/Time:  2019 10:23 PM    Patient PCP: Henrry Lee MD  ______________________________________________________________________  Given the patient's current clinical presentation, I have a high level of concern for decompensation if discharged from the emergency department. Complex decision making was performed, which includes reviewing the patient's available past medical records, laboratory results, and x-ray films. My assessment of this patient's clinical condition and my plan of care is as follows. Assessment / Plan:  Acute on chronic hypercapnic respiratory failure with ? COPD exacerbation  -Possible COPD exacerbation; lung auscultation extremely limited by habitus  -Will continue nebs, oral steroids  -Currently do not perceive a need for antibiotics -- afebrile and at baseline supplemental oxygen requirement  -Continue BiPAP overnight, hopefully can wean in AM    Congestive heart failure, cannot assess whether systolic or diastolic  -BNP slightly elevated but patient with baseline edema, no pulmonary edema or effusion on CXR  -Will continue baseline maintenance medications    Diabetes mellitus  -Resume home medications  -SSI  -Watch for steroid-induced worsening of glycemic control    CAD  -No active symptoms, negative troponin    Arthritis  Morbid obesity    Code Status: Full  Surrogate Decision Maker: Sima Valdez    DVT Prophylaxis: heparin SQ  GI Prophylaxis: not indicated      Subjective:   CHIEF COMPLAINT: \"Sick,\" requesting BiPAP    HISTORY OF PRESENT ILLNESS:     Damon Godoy is a 54 y.o.  female who presents with complaint that she needs BiPAP. She has a known history of congestive heart failure and COPD among other comorbidities and says that she called EMS because her home oxygen and BiPAP \"weren't right\" and were making her \"sick\" and \"shaky. \" She apparently also told triage RN that she was getting \"too much oxygen\" at home making her head hurt. She cannot really elaborate any further than that and communication is limited by BiPAP in place. On arrival to ED, VBG was obtained and showed significant CO2 elevation. She was given prednisone and Duoneb x2 with mild improvement in CO2 elevation only, so she was placed on BiPAP here. She is feeling better. CXR was negative for acute process. BNP was modestly elevated but appears to be chronically so in this patient. Troponin was negative. We were asked to admit for work up and evaluation of the above problems. Past Medical History:   Diagnosis Date    Arthritis     Asthma     Breast lump     CAD (coronary artery disease)     Cancer (HCC)     breast cancer    Congestive heart failure (HCC)     [facility]ch[facility] obstructive [facility] disease) [facility]     [facility] [address]     Hypertension     Morbid obesity with BMI of 70 and over, adult ([facility] [address]     NSTEMI (non-ST elevated myocardial infarction) ([facility] [address]     SVT (supraventricular tachycardia) ([facility] [address]       Past Surgical History:   Procedure Laterality Date    CARDIAC SURG PROCEDURE UNLIST      Stents    HX  SECTION      HX ORTHOPAEDIC      HX OTHER SURGICAL      cyst removed from back     Social History     Tobacco Use    Smoking status: Former Smoker     Packs/day: 0.25     Types: Cigarettes    Smokeless tobacco: Never Used    Tobacco comment: Patient states \"I  aint smoking no more d*mn cigarettes after yesterday\" 2018   Substance Use Topics    Alcohol use: No      Family History   Problem Relation Age of Onset    Heart Disease Mother     Heart Disease Father     Heart Disease Sister     Breast Cancer Maternal Grandmother     Breast Cancer Paternal Grandmother      No Known Allergies     Prior to Admission medications    Medication Sig Start Date End Date Taking?  Authorizing Provider   ondansetron (ZOFRAN ODT) 8 mg disintegrating tablet Take 1 Tab by mouth every eight (8) hours as needed for Nausea. 12/3/19   Kim Marte MD   aluminum-magnesium hydroxide (MAALOX) 200-200 mg/5 mL suspension Take 15 mL by mouth every six (6) hours as needed for Indigestion. 12/3/19   Kim Marte MD   famotidine (PEPCID) 20 mg tablet Take 1 Tab by mouth two (2) times a day for 10 days. 12/3/19 12/13/19  Kim Marte MD   ibuprofen (MOTRIN) 600 mg tablet Take 1 Tab by mouth every six (6) hours as needed for Pain. 12/1/19   Samuel Mcclendon MD   nystatin (MYCOSTATIN) topical cream Apply  to affected area two (2) times a day. 9/16/19   Esther Richardson MD   alum-mag hydroxide-simeth (MAALOX ADVANCED) 200-200-20 mg/5 mL susp Take 30 mL by mouth every four (4) hours as needed for Pain. 6/25/19   Nils Sales PA   ranolazine ER (RANEXA) 500 mg SR tablet Take 1 Tab by mouth two (2) times a day. 4/8/19   Bertha Mckinley NP   cyclobenzaprine (FLEXERIL) 5 mg tablet Take 1 Tab by mouth two (2) times daily as needed for Muscle Spasm(s). flexeriol 3/17/19   Astrid Almazan NP   albuterol-ipratropium (DUO-NEB) 2.5 mg-0.5 mg/3 ml nebu 3 mL by Nebulization route four (4) times daily. 3/3/19   Jackelyn Fernandez MD   furosemide (LASIX) 40 mg tablet Take 40 mg by mouth two (2) times a day. Other, MD Landon   fluticasone (FLONASE) 50 mcg/actuation nasal spray 2 Sprays by Both Nostrils route daily. 1/6/19   Jass Carter MD   insulin glargine (LANTUS) 100 unit/mL injection 45 Units by SubCUTAneous route daily. Provider, Historical   metoprolol tartrate (LOPRESSOR) 25 mg tablet Take 25 mg by mouth two (2) times a day. Provider, Historical   fluticasone-salmeterol (ADVAIR DISKUS) 500-50 mcg/dose diskus inhaler Take 1 Puff by inhalation every twelve (12) hours. Provider, Historical   loratadine (CLARITIN) 10 mg tablet Take 10 mg by mouth daily.     Provider, Historical   albuterol (VENTOLIN HFA) 90 mcg/actuation inhaler Take 2 Puffs by inhalation every six (6) hours as needed for Wheezing. Provider, Historical   aspirin 81 mg chewable tablet Take 81 mg by mouth daily. Provider, Historical   hydrOXYzine pamoate (VISTARIL) 50 mg capsule Take 50 mg by mouth every eight (8) hours as needed for Itching. Provider, Historical   lovastatin (MEVACOR) 40 mg tablet Take 1 Tab by mouth nightly. 1/14/16   Celestina Corral NP   glyBURIDE (DIABETA) 5 mg tablet Take 5 mg by mouth two (2) times daily (with meals). Provider, Historical   ammonium lactate (LAC-HYDRIN) 12 % topical cream rub in to affected area well 11/21/14   Maty Song MD   nitroglycerin (NITROSTAT) 0.4 mg SL tablet 1 Tab by SubLINGual route every five (5) minutes as needed for Chest Pain (call 911 if not relieved by 3). 9/12/13   Celestina Corral NP       REVIEW OF SYSTEMS:     I am not able to complete the review of systems because:    The patient is intubated and sedated    The patient has altered mental status due to his acute medical problems    The patient has baseline aphasia from prior stroke(s)    The patient has baseline dementia and is not reliable historian    The patient is in acute medical distress and unable to provide information           Total of 12 systems reviewed as follows:       POSITIVE= underlined text  Negative = text not underlined  General:  fever, chills, sweats, generalized weakness, weight loss/gain,      loss of appetite   Eyes:    blurred vision, eye pain, loss of vision, double vision  ENT:    rhinorrhea, pharyngitis   Respiratory:   cough, sputum production, SOB, RAZO, wheezing, pleuritic pain   Cardiology:   chest pain, palpitations, orthopnea, PND, edema (chronic, stable), syncope   Gastrointestinal:  abdominal pain , N/V, diarrhea, dysphagia, constipation, bleeding   Genitourinary:  frequency, urgency, dysuria, hematuria, incontinence   Muskuloskeletal :  arthralgia, myalgia, back pain  Hematology:  easy bruising, nose or gum bleeding, lymphadenopathy Dermatological: rash, ulceration, pruritis, color change / jaundice  Endocrine:   hot flashes or polydipsia   Neurological:  headache, dizziness, confusion, focal weakness, paresthesia,     Speech difficulties, memory loss, gait difficulty  Psychological: Feelings of anxiety, depression, agitation    Objective:   VITALS:    Visit Vitals  /66 (BP 1 Location: Left arm, BP Patient Position: At rest)   Pulse 77   Temp 97.5 °F (36.4 °C)   Resp 19   Ht 5' 6\" (1.676 m)   Wt (!) 194.6 kg (429 lb)   SpO2 98%   BMI 69.24 kg/m²       PHYSICAL EXAM:    General:    Alert, cooperative, no distress, appears stated age. Chronically ill and morbidly obese. HEENT: Atraumatic, anicteric sclerae, pink conjunctivae     No oral ulcers, mucosa moist, throat clear, dentition fair  Neck:  Supple, symmetrical  Lungs:   Extremely distant with diminished breath sounds. No readily audible wheezing or Rhonchi. No rales. Chest wall:  No tenderness  No Accessory muscle use. Heart:   Regular  rhythm,  No  Murmur. Abdomen:   Soft, non-tender. Not distended. Bowel sounds normal  Extremities: No cyanosis. No clubbing,      Extensive fibrotic change of the lower extremities  Skin:     Not pale. Not Jaundiced  No rashes   Psych:  Fair insight. Not depressed. Not anxious or agitated. Neurologic: EOMs intact. No facial asymmetry. No aphasia or slurred speech. Symmetrical strength, Sensation grossly intact.  Alert and oriented X 4.     _______________________________________________________________________  Care Plan discussed with:    Comments   Patient x    Family      RN     Care Manager                    Consultant:      _______________________________________________________________________  Expected  Disposition:   Home with Family x   HH/PT/OT/RN    SNF/LTC    LUIS A    ________________________________________________________________________  TOTAL TIME:  39 Minutes    Critical Care Provided     Minutes non procedure based Comments    x Reviewed previous records   >50% of visit spent in counseling and coordination of care x Discussion with patient and/or family and questions answered       ________________________________________________________________________  Signed: Beatrice Currie MD    Procedures: see electronic medical records for all procedures/Xrays and details which were not copied into this note but were reviewed prior to creation of Plan. LAB DATA REVIEWED:    Recent Results (from the past 24 hour(s))   EKG, 12 LEAD, INITIAL    Collection Time: 12/06/19  5:39 PM   Result Value Ref Range    Ventricular Rate 93 BPM    Atrial Rate 81 BPM    P-R Interval 204 ms    QRS Duration 78 ms    Q-T Interval 366 ms    QTC Calculation (Bezet) 455 ms    Calculated P Axis 44 degrees    Calculated R Axis 81 degrees    Calculated T Axis 43 degrees    Diagnosis       Sinus rhythm with premature supraventricular complexes  Low voltage QRS  When compared with ECG of 04-DEC-2019 18:49,  premature supraventricular complexes are now present     POC VENOUS BLOOD GAS    Collection Time: 12/06/19  5:52 PM   Result Value Ref Range    Device: NASAL CANNULA      Flow rate (POC) 3 L/M    pH, venous (POC) 7.279 (L) 7.32 - 7.42      pCO2, venous (POC) 88.1 (H) 41 - 51 MMHG    pO2, venous (POC) 31 25 - 40 mmHg    HCO3, venous (POC) 41.3 (H) 23.0 - 28.0 MMOL/L    sO2, venous (POC) 47 (L) 65 - 88 %    Base excess, venous (POC) 15 mmol/L    Allens test (POC) N/A      Total resp.  rate 23      Site OTHER      Specimen type (POC) VENOUS BLOOD     POC TROPONIN-I    Collection Time: 12/06/19  5:56 PM   Result Value Ref Range    Troponin-I (POC) <0.04 0.00 - 0.08 ng/mL   POC CHEM8    Collection Time: 12/06/19  5:57 PM   Result Value Ref Range    Calcium, ionized (POC) 1.29 1.12 - 1.32 mmol/L    Sodium (POC) 140 136 - 145 mmol/L    Potassium (POC) 4.7 3.5 - 5.1 mmol/L    Chloride (POC) 96 (L) 98 - 107 mmol/L    CO2 (POC) 44 (HH) 21 - 32 mmol/L    Anion gap (POC) 6 (L) 10 - 20 mmol/L    Glucose (POC) 113 (H) 65 - 100 mg/dL    BUN (POC) 16 9 - 20 mg/dL    Creatinine (POC) 0.9 0.6 - 1.3 mg/dL    GFRAA, POC >60 >60 ml/min/1.73m2    GFRNA, POC >60 >60 ml/min/1.73m2    Hematocrit (POC) 42 35.0 - 47.0 %    Comment Comment Not Indicated. POC VENOUS BLOOD GAS    Collection Time: 12/06/19  8:10 PM   Result Value Ref Range    Device: NASAL CANNULA      Flow rate (POC) 3 L/M    pH, venous (POC) 7.345 7.32 - 7.42      pCO2, venous (POC) 77.4 (H) 41 - 51 MMHG    pO2, venous (POC) 30 25 - 40 mmHg    HCO3, venous (POC) 42.3 (H) 23.0 - 28.0 MMOL/L    sO2, venous (POC) 50 (L) 65 - 88 %    Base excess, venous (POC) 17 mmol/L    Allens test (POC) N/A      Total resp.  rate 18      Site OTHER      Specimen type (POC) VENOUS BLOOD     NT-PRO BNP    Collection Time: 12/06/19  9:11 PM   Result Value Ref Range    NT pro- (H) <125 PG/ML   TROPONIN I    Collection Time: 12/06/19  9:11 PM   Result Value Ref Range    Troponin-I, Qt. <0.05 <5.08 ng/mL   METABOLIC PANEL, BASIC    Collection Time: 12/06/19  9:11 PM   Result Value Ref Range    Sodium 138 136 - 145 mmol/L    Potassium 4.2 3.5 - 5.1 mmol/L    Chloride 100 97 - 108 mmol/L    CO2 37 (H) 21 - 32 mmol/L    Anion gap 1 (L) 5 - 15 mmol/L    Glucose 193 (H) 65 - 100 mg/dL    BUN 14 6 - 20 MG/DL    Creatinine 0.88 0.55 - 1.02 MG/DL    BUN/Creatinine ratio 16 12 - 20      GFR est AA >60 >60 ml/min/1.73m2    GFR est non-AA >60 >60 ml/min/1.73m2    Calcium 9.8 8.5 - 10.1 MG/DL   GLUCOSE, POC    Collection Time: 12/06/19  9:19 PM   Result Value Ref Range    Glucose (POC) 179 (H) 65 - 100 mg/dL    Performed by Vince Guzman RN

## 2019-12-08 VITALS
HEART RATE: 70 BPM | OXYGEN SATURATION: 93 % | WEIGHT: 293 LBS | SYSTOLIC BLOOD PRESSURE: 109 MMHG | TEMPERATURE: 98.2 F | RESPIRATION RATE: 18 BRPM | HEIGHT: 66 IN | BODY MASS INDEX: 47.09 KG/M2 | DIASTOLIC BLOOD PRESSURE: 52 MMHG

## 2019-12-08 LAB
GLUCOSE BLD STRIP.AUTO-MCNC: 163 MG/DL (ref 65–100)
GLUCOSE BLD STRIP.AUTO-MCNC: 196 MG/DL (ref 65–100)
GLUCOSE BLD STRIP.AUTO-MCNC: 66 MG/DL (ref 65–100)
GLUCOSE BLD STRIP.AUTO-MCNC: 77 MG/DL (ref 65–100)
SERVICE CMNT-IMP: ABNORMAL
SERVICE CMNT-IMP: ABNORMAL
SERVICE CMNT-IMP: NORMAL
SERVICE CMNT-IMP: NORMAL

## 2019-12-08 PROCEDURE — 74011250637 HC RX REV CODE- 250/637: Performed by: INTERNAL MEDICINE

## 2019-12-08 PROCEDURE — 74011636637 HC RX REV CODE- 636/637: Performed by: INTERNAL MEDICINE

## 2019-12-08 PROCEDURE — 94660 CPAP INITIATION&MGMT: CPT

## 2019-12-08 PROCEDURE — 74011250636 HC RX REV CODE- 250/636: Performed by: INTERNAL MEDICINE

## 2019-12-08 PROCEDURE — 94640 AIRWAY INHALATION TREATMENT: CPT

## 2019-12-08 PROCEDURE — 77010033678 HC OXYGEN DAILY

## 2019-12-08 PROCEDURE — 82962 GLUCOSE BLOOD TEST: CPT

## 2019-12-08 PROCEDURE — 74011000250 HC RX REV CODE- 250: Performed by: INTERNAL MEDICINE

## 2019-12-08 RX ORDER — INSULIN GLARGINE 100 [IU]/ML
23 INJECTION, SOLUTION SUBCUTANEOUS DAILY
Status: DISCONTINUED | OUTPATIENT
Start: 2019-12-08 | End: 2019-12-08 | Stop reason: HOSPADM

## 2019-12-08 RX ORDER — PREDNISONE 20 MG/1
TABLET ORAL
Qty: 18 TAB | Refills: 0 | Status: SHIPPED | OUTPATIENT
Start: 2019-12-08 | End: 2020-01-29

## 2019-12-08 RX ADMIN — Medication 10 ML: at 06:00

## 2019-12-08 RX ADMIN — FLUTICASONE PROPIONATE 2 SPRAY: 50 SPRAY, METERED NASAL at 08:53

## 2019-12-08 RX ADMIN — NYSTATIN: 100000 POWDER TOPICAL at 08:56

## 2019-12-08 RX ADMIN — METOPROLOL TARTRATE 25 MG: 25 TABLET ORAL at 08:51

## 2019-12-08 RX ADMIN — INSULIN GLARGINE 23 UNITS: 100 INJECTION, SOLUTION SUBCUTANEOUS at 08:52

## 2019-12-08 RX ADMIN — FLUTICASONE FUROATE AND VILANTEROL TRIFENATATE 1 PUFF: 200; 25 POWDER RESPIRATORY (INHALATION) at 08:53

## 2019-12-08 RX ADMIN — RANOLAZINE 500 MG: 500 TABLET, FILM COATED, EXTENDED RELEASE ORAL at 08:54

## 2019-12-08 RX ADMIN — ASPIRIN 81 MG 81 MG: 81 TABLET ORAL at 08:51

## 2019-12-08 RX ADMIN — HEPARIN SODIUM 7500 UNITS: 5000 INJECTION INTRAVENOUS; SUBCUTANEOUS at 06:01

## 2019-12-08 RX ADMIN — CYCLOBENZAPRINE 5 MG: 10 TABLET, FILM COATED ORAL at 08:52

## 2019-12-08 RX ADMIN — IPRATROPIUM BROMIDE AND ALBUTEROL SULFATE 3 ML: .5; 3 SOLUTION RESPIRATORY (INHALATION) at 07:37

## 2019-12-08 RX ADMIN — FUROSEMIDE 40 MG: 40 TABLET ORAL at 08:52

## 2019-12-08 RX ADMIN — PREDNISONE 40 MG: 20 TABLET ORAL at 08:51

## 2019-12-08 RX ADMIN — LORATADINE 10 MG: 10 TABLET ORAL at 08:52

## 2019-12-08 RX ADMIN — GLYBURIDE 5 MG: 5 TABLET ORAL at 08:52

## 2019-12-08 NOTE — PROGRESS NOTES
PCP LETICIA appt scheduled with [facility] to see PT in 24-48 hours after discharge at 9:00am. Appt added to [address],  Specialist    CMS is unable to establish hospital follow up appointments for patient due to PCP office being closed. Please advise patient to call Monday to schedule follow up appointments.  Value Base Diagnosis, high risk patients, with high numbers of readmission, or bridge appointment between pcp or specialist follow up will receive a Critical access hospital referral.

## 2019-12-08 NOTE — PROGRESS NOTES
0700: Bedside shift change report given to Shabnam Calvin (oncoming nurse) by Demetrius Dumont (offgoing nurse). Report included the following information SBAR, Kardex, Intake/Output and MAR.     0700: Patient in bed, resting quietly. Call bell in reach, will monitor. 7348: HYPOGLYCEMIC EPISODE DOCUMENTATION    Patient with hypoglycemic episode(s) at 0741(time) on 12/8/19(date). BG value(s) pre-treatment 77    Was patient symptomatic? [] yes, [x] no  Patient was treated with the following rescue medications/treatments: [] D50                [] Glucose tablets                [] Glucagon                [x] 4oz juice                [] 6oz reg soda                [] 8oz low fat milk  BG value post-treatment: 77 --> gave 4 oz of orange juice--> 163  Once BG treated and value greater than 80mg/dl, pt was provided with the following:  [] snack  [x] meal  Name of MD notified: Martita Olivas  The following orders were received: Verbal orders to cut Lantus order in half will give 23 units instead of 45    0742: Dr. Martita Olivas in [facility] and made aware of patient's blood sugar. Verbal orders to put in an order for half of the Lantus. Will order 23 units. Will still give glyburide since patient came up to 163 and is eating her whole breakfast tray    1023: Dr. Martita Olivas at bedside. Will discharge patient     [phone]: Patient needs [facility] transport. Will fill out form and set up transport for patient. 1045: [facility] transport scheduled for 1230 for patient to be transported home    1200: Transport here to  patient. Patient requesting lunch before she leaves.  Transport waiting for extra help, so they are okay with her eating lunch before she leaves

## 2019-12-08 NOTE — PROGRESS NOTES
Problem: Falls - Risk of  Goal: *Absence of Falls  Description  Document Arash Herndon Fall Risk and appropriate interventions in the flowsheet.   Outcome: Progressing Towards Goal  Note: Fall Risk Interventions:            Medication Interventions: Patient to call before getting OOB    Elimination Interventions: Call light in reach

## 2019-12-08 NOTE — CONSULTS
IMPRESSION:   1. Acute on chronic hypercapnei/hypoxic resp failure- long history of GEN/OHS and has been intermittentyl complaint with Trilogy at home  2. GEN/OHS-poorly controlled at baseline  3. CAD-CP-free  4. COPD-unclear diagnosis. PFTs 2015 w/o obstruction. No acute exacerbation  5. Remote tobacco abuse-quit several years ago after 10 pack year history  6. HFpEF-chronically volume overloaded   7. Morbid obesity  8. H/o DM        RECOMMENDATIONS/PLAN:   1. Wean O2 as tolerated to maintain Spo2>90%  2. Breo-RESUME HOME advair at discharge  3. Nebs prn  4. BiPAP at night and napping-pt clearly will benefit from NIV due to her OHS. She has been intolerant of Trilogy for several months. I strongly encouraged her to f/u with Dr Pamela Bustamante as an oupt in order to ensure proper use of Trilogy  5. I have asked the pt to bring her Trilogy to the hospital in order to try it at night and monitor her use  6. Prednisone 40mg daily-anticipate slow taper over next few days  7. DVT ppx-Hep sq    Pt will benefit from outpt pulm evaluation which can be arranged           My assessment/management discussed with: Consultants, Nursing, Pharmacy, Case Management, PT, OT, Respiratory Therapy, Hospitalist and Family for coordination of care      Subjective/Initial History:   I have reviewed the flowsheet and previous days notes. Seen earlier today on rounds. I was asked by Leonora Ortega MD to see Mayi Srivastava a 54 y.o.  female  in consultation for a chief complaint of       53 yo F with h/o morbid obesity, GEN/OHS (on Trilogy), COPD, HF pEF, chronic hypoxic/hypercapneic respiratory failure, COPD, HFpEF presents with vague complaints of not feeling well. The pt has been prescribed Trilogy but unfortunately has been intolerant for the last several weeks due to poor mask fit. She is followed by Dr Pamela Bustamante but has not been seen in over a year.  She state sshe was diagnosed with copd however PFTS from 2015 in our office were c/w restrictive process. She uses Advair daily and quit smoking several years ago after only 10 pack year history. She has supplemental o2 at home ans wears it most of the day. She lives alone but has family with her most of the time to help with ADLs. Upon arrival to the ED CXR was clear and her blood work was largely unrevealing. VBG did show PCO2 77. She was placed on bipap and admitted. pulm was consulted to help with [address] assistance. She was taken off Bipap this morning and feels better overall. She likes the BiPAP more so than her home Trilogy. PMH:  has a past medical history of Arthritis, Asthma, Breast lump, CAD (coronary artery disease), Cancer ([facility] Congestive heart failure ([facility] COPD (chronic obstructive pulmonary disease) ([facility] Diabetes ([facility] Hypertension, Morbid obesity with BMI of 70 and over, adult [facility]), NSTEMI (non-ST elevated myocardial infarction) ([facility] and SVT (supraventricular tachycardia) ([facility] PSH:   has a past surgical history that includes hx orthopaedic; hx other surgical; hx  section; and pr cardiac surg procedure unlist.   FHX: family history includes Breast Cancer in her maternal grandmother and paternal grandmother; Heart Disease in her father, mother, and sister. SHX:  reports that she has quit smoking. Her smoking use included cigarettes. She smoked 0.25 packs per day. She has never used smokeless tobacco. She reports that she does not drink alcohol or use drugs. ROS:A comprehensive review of systems was negative except for that written in the HPI.     No Known Allergies     MAR reviewed and pertinent medications noted or modified as needed  MEDS:   Current Facility-Administered Medications   Medication    albuterol-ipratropium (DUO-NEB) 2.5 MG-0.5 MG/3 ML    albuterol-ipratropium (DUO-NEB) 2.5 MG-0.5 MG/3 ML    nystatin (MYCOSTATIN) 100,000 unit/gram powder    acetaminophen (TYLENOL) tablet 650 mg    sodium chloride (NS) flush 5-40 mL    sodium chloride (NS) flush 5-40 mL    bisacodyl (DULCOLAX) tablet 5 mg    aluminum-magnesium hydroxide (MAALOX) oral suspension 15 mL    aspirin chewable tablet 81 mg    cyclobenzaprine (FLEXERIL) tablet 5 mg    fluticasone propionate (FLONASE) 50 mcg/actuation nasal spray 2 Spray    fluticasone-vilanterol (BREO ELLIPTA) 200mcg-25mcg/puff    furosemide (LASIX) tablet 40 mg    glyBURIDE (DIABETA) tablet 5 mg    hydrOXYzine HCl (ATARAX) tablet 50 mg    insulin glargine (LANTUS) injection 45 Units    loratadine (CLARITIN) tablet 10 mg    pravastatin (PRAVACHOL) tablet 40 mg    metoprolol tartrate (LOPRESSOR) tablet 25 mg    ranolazine ER (RANEXA) tablet 500 mg    predniSONE (DELTASONE) tablet 40 mg    heparin (porcine) injection 7,500 Units        Objective:     Vital Signs: Telemetry:    normal sinus rhythm Intake/Output:   Visit Vitals  BP 97/75 (BP 1 Location: Right arm, BP Patient Position: At rest)   Pulse 71   Temp 98.1 °F (36.7 °C)   Resp 16   Ht 5' 6\" (1.676 m)   Wt (!) 194.6 kg (429 lb)   SpO2 94%   Breastfeeding No   BMI 69.24 kg/m²       Temp (24hrs), Av °F (36.7 °C), Min:97.6 °F (36.4 °C), Max:98.5 °F (36.9 °C)        O2 Device: Nasal cannula O2 Flow Rate (L/min): 4 l/min       Wt Readings from Last 4 Encounters:   19 (!) 194.6 kg (429 lb)   19 (!) 194.6 kg (429 lb)   19 (!) 186.9 kg (412 lb)   19 (!) 194.6 kg (429 lb)          Intake/Output Summary (Last 24 hours) at 2019 2254  Last data filed at 2019 1841  Gross per 24 hour   Intake 480 ml   Output 1650 ml   Net -1170 ml       Last shift:      No intake/output data recorded. Last 3 shifts:  0701 -  1900  In: 480 [P.O.:480]  Out: 0305 [Urine:1650]     Hemodynamics:    CO:    CI:    CVP:    SVR:   PAP Systolic:    PAP Diastolic:    PVR:    QV71:        Ventilator Settings:      Mode Rate TV Press PEEP FiO2 PIP Min.  Vent               35 %     12.7 l/min      Physical Exam:    Gen: no distress  HEENT: poor dentition  Cardiac: no obvious murmurs  Lungs: distant breath sounds  Ext: chronic venous stasis, edema  Neuro: AAO3       Data:   Interval lab and diagnostic data was reviewed. Interval radiology images were independently viewed and available reports were reviewed. All lab results for the last 24 hours reviewed. Labs:    Recent Labs     12/07/19  0408   WBC 6.2   HGB 12.3   *     Recent Labs     12/07/19  0408 12/06/19 2111    138   K 3.9 4.2    100   CO2 36* 37*   * 193*   BUN 13 14   CREA 0.73 0.88   CA 9.7 9.8     No results for input(s): PH, PCO2, PO2, HCO3, FIO2 in the last 72 hours. Recent Labs     12/06/19 2111   TROIQ <0.05     Lab Results   Component Value Date/Time    BNP 33 01/12/2016 02:54 AM      Lab Results   Component Value Date/Time    Culture result: ENTEROCOCCUS FAECALIS GROUP D (A) 11/06/2019 01:35 PM    Culture result: NO GROWTH 5 DAYS 04/04/2019 09:30 PM    Culture result: MODERATE NORMAL RESPIRATORY AMRIK 09/25/2018 12:25 PM     Lab Results   Component Value Date/Time    TSH 2.87 07/09/2018 12:06 AM        Imaging:  I have personally reviewed the patients radiographs and have reviewed the reports:        Results from [place] encounter on 12/06/19   XR CHEST PORT    Narrative EXAM: Portable CXR. 1743 hours. COMPARISON: Chest CTA 12/4/2019. INDICATION: SOB    FINDINGS:  The lungs are clear. Heart is normal in size. There is no overt pulmonary edema. There is no evident pneumothorax, adenopathy or pleural effusion. Impression IMPRESSION: No acute disease or change. Results from [place] encounter on 12/04/19   CTA CHEST W OR W WO CONT    Narrative INDICATION: Dyspnea on exertion    COMPARISON:January 8, 2019    TECHNIQUE:    Routine noncontrast imaging the chest was performed for localization purposes.   Then, following the uneventful intravenous administration of 100 cc [id number],  thin helical axial images were obtained through the chest. 3D image  postprocessing was performed. CT dose reduction was achieved through use of a  standardized protocol tailored for this examination and automatic exposure  control for dose modulation. FINDINGS:    THYROID: No nodule. MEDIASTINUM: No mass or lymphadenopathy. JAZMIN: No mass or lymphadenopathy. THORACIC AORTA: No dissection or aneurysm. PULMONARY ARTERIES: Main pulmonary artery measures 3.6 cm in diameter, not  significantly changed. No evidence of acute pulmonary emboli. TRACHEA/BRONCHI: Patent. ESOPHAGUS: No wall thickening or dilatation. HEART: Unchanged mild cardiomegaly. Moderate coronary artery calcifications. PLEURA: No effusion or pneumothorax. LUNGS: No nodule, mass, or airspace disease. INCIDENTALLY IMAGED UPPER ABDOMEN: No focal abnormality. BONES: New indeterminate sclerotic bone lesion in the L1 vertebral body. ADDITIONAL COMMENTS: N/A      Impression IMPRESSION:  No evidence of acute pulmonary embolus or other acute cardiopulmonary process. Indeterminate sclerotic bone lesion at L2; given the history of breast cancer,  this finding is suspicious for metastatic disease; consider further evaluation  with nonemergent whole body bone scan. This care involved high complexity decision making which includes independently reviewing the patient's past medical records, current laboratory results, medication profiles that were immediately available to me and actual Xray images at the bedside in order to assess, support vital system function, and to treat this degree of vital organ system failure, and to prevent further life threatening deterioration of the patients condition. I was in direct communication with the nursing staff throughout this time. I have personally and independently reviewed the patients interval diagnostic lab data, radiographs and the reports.     I have ordered additional labs to follow the current medical conditions and to monitor treatment responses over the next 24 hours or sooner if needed. I will order additional imaging to follow longitudinal changes found on the most current imaging. Medical Decision Making Today  · Reviewed the flowsheet and previous days notes  · Reviewed and summarized records or history from previous days note or discussions with staff, family  · Parenteral controlled substances - Reviewed/ Adjusted / Dennis Neve / Started  · High Risk Drug therapy requiring intensive monitoring for toxicity: eg steroids, pressors, antibiotics  · Review and order of Clinical lab tests  · Review and Order of Radiology tests  · Review and Order of Medicine tests  · Independent visualization of radiologic Images  · Reviewed Ventilator / NiPPV  · I have personally reviewed the patients ECG / Telemetry  · Abrupt change in neurologic status  · Diagnostic endoscopies with identified risk factors  ·          Thank you for allowing us to participate in the care of this patient. We will be happy to follow along with you.     Domitila Rod, DO

## 2019-12-08 NOTE — PROGRESS NOTES
Bedside and Verbal shift change report given to Elidia Lefort (oncoming nurse) by LIDA Harris RN (offgoing nurse). Report given with SBAR, Kardex, Intake/Output, MAR and Recent Results.

## 2019-12-08 NOTE — PROGRESS NOTES
ADULT PROTOCOL: JET AEROSOL ASSESSMENT    Patient  Robb Francisco     54 y.o.   female     12/7/2019  8:14 PM    Breath Sounds Pre Procedure: Right Breath Sounds: Diminished                               Left Breath Sounds: Diminished    Breath Sounds Post Procedure: Right Breath Sounds: Clear, Diminished                                 Left Breath Sounds: Clear, Diminished    Breathing pattern: Pre procedure Breathing Pattern: Regular          Post procedure Breathing Pattern: Regular    Heart Rate: Pre procedure Pulse: 72           Post procedure Pulse: 75    Resp Rate: Pre procedure Respirations: 20           Post procedure Respirations: 18    Peak Flow: Pre bronchodilator             Post bronchodilator       FVC/FEV1:  N/A    Incentive Spirometry:             Cough: Pre procedure Cough: Non-productive               Post procedure Cough: Non-productive    Suctioned: NO    Sputum: Pre procedure                   Post procedure      Oxygen: O2 Device: Nasal cannula   4L NC     Changed: NO    SpO2: Pre procedure SpO2: 95 %   with oxygen              Post procedure SpO2: 91 %  with oxygen    Nebulizer Therapy: Current medications Aerosolized Medications: DuoNeb      Changed: NO    Smoking History:     Problem List:   Patient Active Problem List   Diagnosis Code    Malignant essential hypertension I10    Asthma J45.909    Obesity hypoventilation syndrome ([facility]) E66.2    Dyspnea R06.00    Chest pressure R07.89    Acute on chronic diastolic heart failure ([facility]) I50.33    NSTEMI (non-ST elevated myocardial infarction) ([facility]) I21.4    S/P PTCA (percutaneous transluminal coronary angioplasty) Z98.61    Coronary atherosclerosis of native coronary artery I25.10    Hypoxia R09.02    Noncompliance with therapeutic plan Z91.11    Chest pain R07.9    GERD (gastroesophageal reflux disease) K21.9    Acute respiratory failure with hypoxia and hypercarbia ([facility]) J96.01, J96.02    COPD [facility] disease) [facility] J44.9    Tobacco abuse Z72.0    BMI 60.0-69.9, adult (HCC) Z68.44    Cellulitis L03.90    SIRS due to infectious process with acute organ dysfunction (HCC) A41.9, R65.20    Sepsis associated hypotension (HCC) A41.9, I95.9    Gangrenous toe (HCC) I96    Type II diabetes mellitus, uncontrolled (HCC) E11.65    HTN (hypertension) T44    Diastolic CHF, chronic (HCC) I50.32    Cough with hemoptysis R04.2    Lymphedema of both lower extremities I89.0    CAP (community acquired pneumonia) J18.9    Cellulitis, leg L03.119    COPD exacerbation ([facility]) J44.1    Maggot infestation B87.9    Shortness of breath R06.02    Multifocal pneumonia J18.9    Acute respiratory failure (HCC) J96.00    Acute on chronic respiratory failure with hypoxia and hypercapnia (HCC) J96.21, J96.22    NSVT (nonsustained ventricular tachycardia) (HCC) I47.2    Respiratory failure (HCC) J96.90    Elevated lipase R74.8    Abdominal pain R10.9    Counseling regarding goals of care Z71.89    Acute pancreatitis K85.90    Breast cancer (HCC) C50.919    Intertrigo L30.4    PNA (pneumonia) J18.9    Productive cough R05    Other fatigue R53.83    Acute chest pain R07.9    Acute on chronic respiratory failure with hypoxia (HCC) J96.21    Acute on chronic respiratory failure with hypercapnia (HCC) J96.22    Hypercapnic respiratory failure (HCC) J96.92       Respiratory Therapist: Gavin Graham RT

## 2019-12-08 NOTE — DISCHARGE SUMMARY
Hospitalist Discharge Summary     Patient ID:  Afia Pollack  [id number]  26 y.o.  1970 12/6/2019    PCP on record: Bon Anderson MD    Admit date: 12/6/2019  Discharge date and time: 12/8/2019    DISCHARGE DIAGNOSIS:  Acute on chronic hypercapnic respiratory failure   Hypoventilation Syndrome   Non Compliance with o2 and Trilogy  COPD Exacerbation due to lack of use of her recommended devices  Chronic Congestive heart failure, cannot assess whether systolic or diastolic on recent 2D echo  Diabetes mellitus  CAD  Arthritis  Super Morbid obesity     CONSULTATIONS:  IP CONSULT TO HOSPITALIST  IP CONSULT TO PULMONOLOGY    Excerpted HPI from H&P of Ayleen Arora MD:  Cinda Menon is a 54 y.o.  female who presents with complaint that she needs BiPAP. She has a known history of congestive heart failure and COPD among other comorbidities and says that she called EMS because her home oxygen and BiPAP \"weren't right\" and were making her \"sick\" and \"shaky. \" She apparently also told triage RN that she was getting \"too much oxygen\" at home making her head hurt. She cannot really elaborate any further than that and communication is limited by BiPAP in place. On arrival to ED, VBG was obtained and showed significant CO2 elevation. She was given prednisone and Duoneb x2 with mild improvement in CO2 elevation only, so she was placed on BiPAP here. She is feeling better. CXR was negative for acute process. BNP was modestly elevated but appears to be chronically so in this patient. Troponin was negative.     We were asked to admit for work up and evaluation of the above problems. ______________________________________________________________________  DISCHARGE SUMMARY/HOSPITAL COURSE:  for full details see H&P, daily progress notes, labs, consult notes.      Acute on chronic hypercapnic respiratory failure POA (Baseline supposed to be on Trilogy and 3L O2)  Due to Known Non Compliance ? intolerance to her trilogy and home O2 at home in settings of Morbid Obesity Hypoventilation Syndrome   COPD exacerbation due to non compliance with Home O2/Trilogy  Pt has been requested by me and pulmonary to have family bring her trilogy from home so that we can try here but patient declined. Pulmonary consult appreciated, recommended OP followup with her Sleep doctor  Nebs  Taper Steroids     Chronic Congestive heart failure, cannot assess whether systolic or diastolic on recent 2D echo  BNP slightly elevated but patient with baseline edema, no pulmonary edema or effusion on CXR  Continue Lasix     Diabetes mellitus  Continue Lantus Glyburide  SSI     CAD  No active symptoms, negative troponin     Arthritis  Super Morbid obesity with Body mass index is 69.24 kg/m².   _______________________________________________________________________  Patient seen and examined by me on discharge day. Pertinent Findings:  Gen:    Not in distress  Chest: Clear lungs  CVS:   Regular rhythm. No edema  Abd:  Soft, not distended, not tender  Neuro:  Alert, non focal  _______________________________________________________________________  DISCHARGE MEDICATIONS:   Current Discharge Medication List      START taking these medications    Details   predniSONE (DELTASONE) 20 mg tablet 3 tabs daily for 3 days   2 tabs daily for 3 days   1 tab   daily for 3 days  Qty: 18 Tab, Refills: 0         CONTINUE these medications which have NOT CHANGED    Details   omeprazole (PRILOSEC) 40 mg capsule Take 40 mg by mouth daily. potassium chloride SR (KLOR-CON 10) 10 mEq tablet Take 10 mEq by mouth four (4) times daily. oxyCODONE IR (ROXICODONE) 5 mg immediate release tablet Take 5 mg by mouth every four (4) hours as needed for Pain.      ranolazine ER (RANEXA) 500 mg SR tablet Take 1 Tab by mouth two (2) times a day.   Qty: 60 Tab, Refills: 8      cyclobenzaprine (FLEXERIL) 5 mg tablet Take 1 Tab by mouth two (2) times daily as needed for Muscle Spasm(s). flexeriol  Qty: 5 Tab, Refills: 0      albuterol-ipratropium (DUO-NEB) 2.5 mg-0.5 mg/3 ml nebu 3 mL by Nebulization route four (4) times daily. Qty: 100 Nebule, Refills: 1      furosemide (LASIX) 40 mg tablet Take 40 mg by mouth two (2) times a day. metoprolol tartrate (LOPRESSOR) 25 mg tablet Take 25 mg by mouth two (2) times a day. albuterol (VENTOLIN HFA) 90 mcg/actuation inhaler Take 2 Puffs by inhalation every six (6) hours as needed for Wheezing. aspirin 81 mg chewable tablet Take 81 mg by mouth daily. hydrOXYzine pamoate (VISTARIL) 50 mg capsule Take 50 mg by mouth every eight (8) hours as needed for Itching. lovastatin (MEVACOR) 40 mg tablet Take 1 Tab by mouth nightly. Qty: 30 Tab, Refills: 6    Associated Diagnoses: Atherosclerosis of native coronary artery without angina pectoris      glyBURIDE (DIABETA) 5 mg tablet Take 5 mg by mouth two (2) times daily (with meals). nystatin (MYCOSTATIN) topical cream Apply  to affected area two (2) times a day. Qty: 15 g, Refills: 0      fluticasone (FLONASE) 50 mcg/actuation nasal spray 2 Sprays by Both Nostrils route daily. Qty: 1 Bottle, Refills: 0      insulin glargine (LANTUS) 100 unit/mL injection 45 Units by SubCUTAneous route daily. fluticasone-salmeterol (ADVAIR DISKUS) 500-50 mcg/dose diskus inhaler Take 1 Puff by inhalation daily. loratadine (CLARITIN) 10 mg tablet Take 10 mg by mouth daily. ammonium lactate (LAC-HYDRIN) 12 % topical cream rub in to affected area well  Qty: 280 g, Refills: 1      nitroglycerin (NITROSTAT) 0.4 mg SL tablet 1 Tab by SubLINGual route every five (5) minutes as needed for Chest Pain (call 911 if not relieved by 3).   Qty: 25 Tab, Refills: 2    Associated Diagnoses: SVT (supraventricular tachycardia) ([facility]congestive heart failure) [facility]             Follow-up Information     Follow up With Specialties Details Why Contact Info    Thaddeus Chiang MD [facility] Schedule an appointment as soon as possible for a visit in 1 week  [address]  [phone]          ________________________________________________________________    Risk of deterioration: Moderate to high due to non compliance    Condition at Discharge:  Stable  __________________________________________________________________    Disposition  Home with family, no needs    ____________________________________________________________________    Code Status: Full Code  ___________________________________________________________________      Total time in minutes spent coordinating this discharge (includes going over instructions, follow-up, prescriptions, and preparing report for sign off to her PCP) :  35 minutes    Signed:  Donte Cantu MD

## 2019-12-08 NOTE — DISCHARGE INSTRUCTIONS
HOSPITALIST DISCHARGE INSTRUCTIONS    NAME: Odessa Garcia   :  1970   MRN:  [id number]     Date/Time:  2019 10:21 AM    ADMIT DATE: 2019     DISCHARGE DATE: 2019     DISCHARGE DIAGNOSIS:  Acute on chronic hypercapnic respiratory failure   Hypoventilation Syndrome   COPD Exacerbation  Chronic Congestive heart failure, cannot assess whether systolic or diastolic on recent 2D echo  Diabetes mellitus  CAD  Arthritis  Morbid obesity      MEDICATIONS:  · It is important that you take the medication exactly as they are prescribed. · Keep your medication in the bottles provided by the pharmacist and keep a list of the medication names, dosages, and times to be taken in your wallet. · Do not take other medications without consulting your doctor. Pain Management: per above medications    What to do at Home    Recommended diet:  Resume previous diet    Recommended activity: Activity as tolerated    If you have questions regarding the hospital related prescriptions or hospital related issues please call [facility] tyrel Campoverde at [phone]. If you experience any of the following symptoms then please call your primary care physician or return to the emergency room if you cannot get hold of your doctor:  Fever, chills, nausea, vomiting, diarrhea, change in mentation, falling, bleeding, shortness of breath      Information obtained by :  I understand that if any problems occur once I am at home I am to contact my physician. I understand and acknowledge receipt of the instructions indicated above.                                                                                                                                            Physician's or R.N.'s Signature                                                                  Date/Time Patient or Representative Signature                                                          Date/Time

## 2019-12-08 NOTE — PROGRESS NOTES
Bedside and verbal shift change report given to [facility] (oncoming nurse) by Tommie Puentes RN (offgoing nurse). Report included the following information SBAR, Kardex, Intake/Output, MAR, Recent Results and Quality Measures.

## 2019-12-19 ENCOUNTER — DOCUMENTATION ONLY (OUTPATIENT)
Dept: CASE MANAGEMENT | Age: 49
End: 2019-12-19

## 2019-12-19 NOTE — MANAGEMENT PLAN
Patient Management Plan        Pt Name: Elan dorsey DYD:[birth year]        Management Plan by: Shari Gleason Team                                                                                       Date Placed:  19     Pt's Physicians:         Primary Care:  Hayes Rogers MD       Contact #:  (Visiting Physicians) [phone]             Summary    Reason for Referral: This patient has been provided a management plan for Medical Needs, Social Needs and Psychiatric Needs     Patient with frequent visits for:  COPD exacerbation, SOB, Respiratory Failure      Warning/Safety Alert:      is not significant for controlled substances     Situation: Chronic Conditions Summary - (core issues)     Root Cause Medical Problems List:  HTN, CHF, COPD, Morbidly Obese, Hx Breast Ca, NSTEMI  Root Cause Psychosocial Problems List:  Questionable underlying depression    Root Cause Social Determinants of Health (SDOH):  Lives alone - questionable living oueruuwexy-yrcvelw-ta of possible black mold in rental house, personal aide 6hrs/day, estranged family (siblings), 1 living son (twin of living son  at 10 months old), loss of mother     Incidence of Testing: Over past 12 months: 15 XR, 3 CT   Pattern of access between Dec 2018-Dec 2019: 25 [facility] ED visits, 1 ED visits @ [facility]. 6 hospitalizations. Goals/Interventions:    ED Provider/nursing(together) to discuss mgmt plan w/ pt   Nursing to obtain ordered UDS (none on file)   Nursing/CM obtain updated list of current providers and attempt to obtain ALIZE for coordination of care   Request/obtain consent for Shari Gleason team member for follow up outreach.  CM/Provider- Please remind pt of the appropriate use of ED and provide [facility] information (seen by [facility] in the past)     CM- Determine if pt willing to receive counseling services or is eligible - Referral can be made to: [facility] [email or web address] [phone] 108[facility]     Pt may need Palliative consulted during admissions (previous palliative consults noted) and psychiatry consult to determine underlying depression (r/t mothers death in 2018, estrangement from family)    During Business Hours: CM/PCPs Office   After Hours: [facility](as warranted)     Challenges for Self Management:      Functional limitations:  unable to perform ADLs, utilizes walker/WC, O2 dependent    Pts PCP provides home based services monthly    Utilization: HIGH ED Utilization/admissions   Emotional status of Patient: uncooperative at times w/ staff  - may appear ungrateful/demanding - Underlying depression/sadness contributing to her emotions towards others  Behavioral Health Factors:  Continued non-compliance w/ tx and lack of self-care     Medication Management:     Complications from poor adherence    Poor Adherence             Poly-pharm     Advanced Care Plan: Advanced directive on file               ** This plan has been created by the Care Coordination Committee, a multi-disciplinary team. The patient and their physician were invited to participate in this plan. This management plan is intended to provide consistent evaluation and treatment for this patient not to supersede physician judgment. **

## 2020-01-28 NOTE — H&P
Hospitalist Progress Note NAME: Lana Schwab :  1970 MRN:  [id number] Assessment / Plan: 1. Acute on chronic hypoxic hypercapnic respiratory failure. Doing better this morning less SOB, continue intermittent BiPAP. F/U  Consult with pulmonologist. 
2.  Acute chest pain. No more chest pain. 3.  Chronic obstructive pulmonary disease. Continue current management and adjust as needed. 4.  Type 2 diabetes mellitus. Continue current management and adjust as needed . 6. Hypertension. BP under control continue current management and adjust meds as needed. 7.  Tachycardia. Continue telemetry monitoring. Mange primary condition Body mass index is 67.31 kg/m². Code status: full Prophylaxis: yes Recommended Disposition: home Subjective: Chief Complaint / Reason for Physician Visit SOB, feeling better today Review of Systems: 
Symptom Y/N Comments  Symptom Y/N Comments Fever/Chills    Chest Pain Poor Appetite    Edema Cough    Abdominal Pain Sputum    Joint Pain SOB/RAZO    Pruritis/Rash Nausea/vomit    Tolerating PT/OT Diarrhea    Tolerating Diet Constipation    Other Could NOT obtain due to:   
 
Objective: VITALS:  
Last 24hrs VS reviewed since prior progress note. Most recent are: 
Patient Vitals for the past 24 hrs: 
 Temp Pulse Resp BP SpO2  
19 0856  90 22 95/52 92 % 19 0737     93 % 19 0724 99.1 °F (37.3 °C) 80 20 95/41 95 % 19 0300 98.1 °F (36.7 °C) 81  126/46 96 % 19 2318 98.4 °F (36.9 °C) 74 22 104/51 99 % 19 2253     98 % 19 1955     100 % 19 1926 97.8 °F (36.6 °C) 79 22 107/65 95 % 19 1536 98.7 °F (37.1 °C) 83  118/67 93 % 19 1142 98.9 °F (37.2 °C) 80  118/62 100 % Intake/Output Summary (Last 24 hours) at 2019 1127 Last data filed at 2019 2253 Gross per 24 hour Intake 600 ml Output 1100 ml Net -500 ml PHYSICAL EXAM: 
General: WD, WN. Alert, cooperative, no acute distress   
EENT:  EOMI. Anicteric sclerae. MMM Resp:  CTA bilaterally, decreased respiratory sounds. No accessory muscle use CV:  Regular  rhythm,  No edema GI:  Soft, Non distended, Non tender.  +Bowel sounds Neurologic:  Alert and oriented X 3, normal speech, Psych:   Good insight. Not anxious nor agitated Skin:  No rashes. No jaundice Reviewed most current lab test results and cultures  YES Reviewed most current radiology test results   YES Review and summation of old records today    NO Reviewed patient's current orders and MAR    YES 
PMH/SH reviewed - no change compared to H&P 
________________________________________________________________________ Care Plan discussed with: 
  Comments Patient Family RN Care Manager Consultant Multidiciplinary team rounds were held today with , nursing, pharmacist and clinical coordinator. Patient's plan of care was discussed; medications were reviewed and discharge planning was addressed. ________________________________________________________________________ Total NON critical care TIME:  35   Minutes Total CRITICAL CARE TIME Spent:   Minutes non procedure based Comments >50% of visit spent in counseling and coordination of care    
________________________________________________________________________ Adama Rea MD  
 
Procedures: see electronic medical records for all procedures/Xrays and details which were not copied into this note but were reviewed prior to creation of Plan. LABS: 
I reviewed today's most current labs and imaging studies. Pertinent labs include: 
Recent Labs 01/05/19 
4437 01/04/19 
0411 WBC 7.1 7.8 HGB 10.9* 11.1*  
HCT 37.8 38.3 * 51* Recent Labs 01/05/19 
4122 01/04/19 
0411  140  
K 4.0 3.8  102 CO2 35* 35* * 132* BUN 15 16 CREA 0.68 0.76  
CA 10.1 10.0 Signed: Agustín Matthews MD 
 
 
 
 ambulatory

## 2020-01-29 ENCOUNTER — APPOINTMENT (OUTPATIENT)
Dept: GENERAL RADIOLOGY | Age: 50
DRG: 871 | End: 2020-01-29
Attending: EMERGENCY MEDICINE
Payer: MEDICARE

## 2020-01-29 ENCOUNTER — HOSPITAL ENCOUNTER (INPATIENT)
Age: 50
LOS: 4 days | Discharge: HOME | DRG: 871 | End: 2020-02-02
Attending: EMERGENCY MEDICINE | Admitting: INTERNAL MEDICINE
Payer: MEDICARE

## 2020-01-29 DIAGNOSIS — J44.1 ACUTE EXACERBATION OF CHRONIC OBSTRUCTIVE PULMONARY DISEASE (COPD) (HCC): Primary | ICD-10-CM

## 2020-01-29 LAB
ALBUMIN SERPL-MCNC: 3.6 G/DL (ref 3.5–5)
ALBUMIN/GLOB SERPL: 0.7 {RATIO} (ref 1.1–2.2)
ALP SERPL-CCNC: 118 U/L (ref 45–117)
ALT SERPL-CCNC: 24 U/L (ref 12–78)
ANION GAP SERPL CALC-SCNC: 4 MMOL/L (ref 5–15)
AST SERPL-CCNC: 27 U/L (ref 15–37)
BASOPHILS # BLD: 0 K/UL (ref 0–0.1)
BASOPHILS NFR BLD: 0 % (ref 0–1)
BILIRUB SERPL-MCNC: 0.5 MG/DL (ref 0.2–1)
BUN SERPL-MCNC: 15 MG/DL (ref 6–20)
BUN/CREAT SERPL: 20 (ref 12–20)
CALCIUM SERPL-MCNC: 11.1 MG/DL (ref 8.5–10.1)
CHLORIDE SERPL-SCNC: 100 MMOL/L (ref 97–108)
CO2 SERPL-SCNC: 33 MMOL/L (ref 21–32)
CREAT SERPL-MCNC: 0.76 MG/DL (ref 0.55–1.02)
DIFFERENTIAL METHOD BLD: ABNORMAL
EOSINOPHIL # BLD: 0 K/UL (ref 0–0.4)
EOSINOPHIL NFR BLD: 0 % (ref 0–7)
ERYTHROCYTE [DISTWIDTH] IN BLOOD BY AUTOMATED COUNT: 13.1 % (ref 11.5–14.5)
GLOBULIN SER CALC-MCNC: 5.4 G/DL (ref 2–4)
GLUCOSE BLD STRIP.AUTO-MCNC: 212 MG/DL (ref 65–100)
GLUCOSE BLD STRIP.AUTO-MCNC: 243 MG/DL (ref 65–100)
GLUCOSE SERPL-MCNC: 209 MG/DL (ref 65–100)
HCT VFR BLD AUTO: 49.3 % (ref 35–47)
HGB BLD-MCNC: 14.6 G/DL (ref 11.5–16)
IMM GRANULOCYTES # BLD AUTO: 0 K/UL (ref 0–0.04)
IMM GRANULOCYTES NFR BLD AUTO: 0 % (ref 0–0.5)
LYMPHOCYTES # BLD: 0.9 K/UL (ref 0.8–3.5)
LYMPHOCYTES NFR BLD: 6 % (ref 12–49)
MCH RBC QN AUTO: 27.6 PG (ref 26–34)
MCHC RBC AUTO-ENTMCNC: 29.6 G/DL (ref 30–36.5)
MCV RBC AUTO: 93.2 FL (ref 80–99)
MONOCYTES # BLD: 0.2 K/UL (ref 0–1)
MONOCYTES NFR BLD: 1 % (ref 5–13)
NEUTS SEG # BLD: 14.1 K/UL (ref 1.8–8)
NEUTS SEG NFR BLD: 93 % (ref 32–75)
NRBC # BLD: 0 K/UL (ref 0–0.01)
NRBC BLD-RTO: 0 PER 100 WBC
PLATELET # BLD AUTO: 152 K/UL (ref 150–400)
PMV BLD AUTO: 10.9 FL (ref 8.9–12.9)
POTASSIUM SERPL-SCNC: 5.1 MMOL/L (ref 3.5–5.1)
PROT SERPL-MCNC: 9 G/DL (ref 6.4–8.2)
RBC # BLD AUTO: 5.29 M/UL (ref 3.8–5.2)
RBC MORPH BLD: ABNORMAL
SERVICE CMNT-IMP: ABNORMAL
SERVICE CMNT-IMP: ABNORMAL
SODIUM SERPL-SCNC: 137 MMOL/L (ref 136–145)
WBC # BLD AUTO: 15.2 K/UL (ref 3.6–11)

## 2020-01-29 PROCEDURE — A9270 NON-COVERED ITEM OR SERVICE: HCPCS | Performed by: EMERGENCY MEDICINE

## 2020-01-29 PROCEDURE — 74011000250 HC RX REV CODE- 250: Performed by: EMERGENCY MEDICINE

## 2020-01-29 PROCEDURE — 82962 GLUCOSE BLOOD TEST: CPT

## 2020-01-29 PROCEDURE — 77010033678 HC OXYGEN DAILY

## 2020-01-29 PROCEDURE — 77030029684 HC NEB SM VOL KT MONA -A

## 2020-01-29 PROCEDURE — 85025 COMPLETE CBC W/AUTO DIFF WBC: CPT

## 2020-01-29 PROCEDURE — 94640 AIRWAY INHALATION TREATMENT: CPT

## 2020-01-29 PROCEDURE — 74011000250 HC RX REV CODE- 250: Performed by: INTERNAL MEDICINE

## 2020-01-29 PROCEDURE — 65660000000 HC RM CCU STEPDOWN

## 2020-01-29 PROCEDURE — 74011250637 HC RX REV CODE- 250/637: Performed by: INTERNAL MEDICINE

## 2020-01-29 PROCEDURE — 80053 COMPREHEN METABOLIC PANEL: CPT

## 2020-01-29 PROCEDURE — 74011636637 HC RX REV CODE- 636/637: Performed by: EMERGENCY MEDICINE

## 2020-01-29 PROCEDURE — 99285 EMERGENCY DEPT VISIT HI MDM: CPT

## 2020-01-29 PROCEDURE — 74011250636 HC RX REV CODE- 250/636: Performed by: INTERNAL MEDICINE

## 2020-01-29 PROCEDURE — 71045 X-RAY EXAM CHEST 1 VIEW: CPT

## 2020-01-29 PROCEDURE — 74011636637 HC RX REV CODE- 636/637: Performed by: INTERNAL MEDICINE

## 2020-01-29 PROCEDURE — 94760 N-INVAS EAR/PLS OXIMETRY 1: CPT

## 2020-01-29 PROCEDURE — 36415 COLL VENOUS BLD VENIPUNCTURE: CPT

## 2020-01-29 RX ORDER — IPRATROPIUM BROMIDE AND ALBUTEROL SULFATE 2.5; .5 MG/3ML; MG/3ML
3 SOLUTION RESPIRATORY (INHALATION)
Status: COMPLETED | OUTPATIENT
Start: 2020-01-29 | End: 2020-01-29

## 2020-01-29 RX ORDER — DEXTROSE MONOHYDRATE 100 MG/ML
0-250 INJECTION, SOLUTION INTRAVENOUS AS NEEDED
Status: DISCONTINUED | OUTPATIENT
Start: 2020-01-29 | End: 2020-02-02 | Stop reason: HOSPADM

## 2020-01-29 RX ORDER — IPRATROPIUM BROMIDE 0.5 MG/2.5ML
0.5 SOLUTION RESPIRATORY (INHALATION)
Status: COMPLETED | OUTPATIENT
Start: 2020-01-29 | End: 2020-01-29

## 2020-01-29 RX ORDER — GLYBURIDE 5 MG/1
5 TABLET ORAL
Status: DISCONTINUED | OUTPATIENT
Start: 2020-01-29 | End: 2020-01-29

## 2020-01-29 RX ORDER — ALBUTEROL SULFATE 0.83 MG/ML
5 SOLUTION RESPIRATORY (INHALATION)
Status: COMPLETED | OUTPATIENT
Start: 2020-01-29 | End: 2020-01-29

## 2020-01-29 RX ORDER — GLYBURIDE 5 MG/1
5 TABLET ORAL 2 TIMES DAILY WITH MEALS
Status: DISCONTINUED | OUTPATIENT
Start: 2020-01-29 | End: 2020-01-30

## 2020-01-29 RX ORDER — FUROSEMIDE 40 MG/1
40 TABLET ORAL
Status: DISCONTINUED | OUTPATIENT
Start: 2020-01-29 | End: 2020-02-02 | Stop reason: HOSPADM

## 2020-01-29 RX ORDER — IPRATROPIUM BROMIDE AND ALBUTEROL SULFATE 2.5; .5 MG/3ML; MG/3ML
3 SOLUTION RESPIRATORY (INHALATION) 4 TIMES DAILY
Status: DISCONTINUED | OUTPATIENT
Start: 2020-01-29 | End: 2020-01-29

## 2020-01-29 RX ORDER — SODIUM CHLORIDE 0.9 % (FLUSH) 0.9 %
5-40 SYRINGE (ML) INJECTION EVERY 8 HOURS
Status: DISCONTINUED | OUTPATIENT
Start: 2020-01-29 | End: 2020-02-02 | Stop reason: HOSPADM

## 2020-01-29 RX ORDER — POTASSIUM CHLORIDE 750 MG/1
10 TABLET, FILM COATED, EXTENDED RELEASE ORAL 4 TIMES DAILY
Status: DISCONTINUED | OUTPATIENT
Start: 2020-01-29 | End: 2020-01-30

## 2020-01-29 RX ORDER — IPRATROPIUM BROMIDE AND ALBUTEROL SULFATE 2.5; .5 MG/3ML; MG/3ML
3 SOLUTION RESPIRATORY (INHALATION)
Status: DISCONTINUED | OUTPATIENT
Start: 2020-01-29 | End: 2020-02-02 | Stop reason: HOSPADM

## 2020-01-29 RX ORDER — HYDROXYZINE 25 MG/1
50 TABLET, FILM COATED ORAL
Status: DISCONTINUED | OUTPATIENT
Start: 2020-01-29 | End: 2020-02-02 | Stop reason: HOSPADM

## 2020-01-29 RX ORDER — METOPROLOL TARTRATE 25 MG/1
25 TABLET, FILM COATED ORAL 2 TIMES DAILY
Status: DISCONTINUED | OUTPATIENT
Start: 2020-01-29 | End: 2020-02-02 | Stop reason: HOSPADM

## 2020-01-29 RX ORDER — MAGNESIUM SULFATE 100 %
4 CRYSTALS MISCELLANEOUS AS NEEDED
Status: DISCONTINUED | OUTPATIENT
Start: 2020-01-29 | End: 2020-02-02 | Stop reason: HOSPADM

## 2020-01-29 RX ORDER — DOXYCYCLINE HYCLATE 100 MG
100 TABLET ORAL EVERY 12 HOURS
Status: DISCONTINUED | OUTPATIENT
Start: 2020-01-29 | End: 2020-02-02 | Stop reason: HOSPADM

## 2020-01-29 RX ORDER — ACETAMINOPHEN 325 MG/1
650 TABLET ORAL
Status: DISCONTINUED | OUTPATIENT
Start: 2020-01-29 | End: 2020-02-02 | Stop reason: HOSPADM

## 2020-01-29 RX ORDER — FLUTICASONE PROPIONATE 50 MCG
2 SPRAY, SUSPENSION (ML) NASAL DAILY
Status: DISCONTINUED | OUTPATIENT
Start: 2020-01-30 | End: 2020-02-02 | Stop reason: HOSPADM

## 2020-01-29 RX ORDER — OXYCODONE HYDROCHLORIDE 5 MG/1
5 TABLET ORAL
Status: DISCONTINUED | OUTPATIENT
Start: 2020-01-29 | End: 2020-02-02 | Stop reason: HOSPADM

## 2020-01-29 RX ORDER — GUAIFENESIN 100 MG/5ML
81 LIQUID (ML) ORAL DAILY
Status: DISCONTINUED | OUTPATIENT
Start: 2020-01-30 | End: 2020-02-02 | Stop reason: HOSPADM

## 2020-01-29 RX ORDER — INSULIN GLARGINE 100 [IU]/ML
45 INJECTION, SOLUTION SUBCUTANEOUS DAILY
Status: DISCONTINUED | OUTPATIENT
Start: 2020-01-30 | End: 2020-01-29

## 2020-01-29 RX ORDER — CYCLOBENZAPRINE HCL 10 MG
5 TABLET ORAL
Status: DISCONTINUED | OUTPATIENT
Start: 2020-01-29 | End: 2020-02-02 | Stop reason: HOSPADM

## 2020-01-29 RX ORDER — SODIUM CHLORIDE 0.9 % (FLUSH) 0.9 %
5-40 SYRINGE (ML) INJECTION AS NEEDED
Status: DISCONTINUED | OUTPATIENT
Start: 2020-01-29 | End: 2020-02-02 | Stop reason: HOSPADM

## 2020-01-29 RX ORDER — GLYBURIDE 5 MG/1
5 TABLET ORAL ONCE
Status: COMPLETED | OUTPATIENT
Start: 2020-01-29 | End: 2020-01-29

## 2020-01-29 RX ORDER — RANOLAZINE 500 MG/1
500 TABLET, EXTENDED RELEASE ORAL 2 TIMES DAILY
Status: DISCONTINUED | OUTPATIENT
Start: 2020-01-29 | End: 2020-01-30

## 2020-01-29 RX ORDER — PANTOPRAZOLE SODIUM 40 MG/1
40 TABLET, DELAYED RELEASE ORAL
Status: DISCONTINUED | OUTPATIENT
Start: 2020-01-30 | End: 2020-02-02 | Stop reason: HOSPADM

## 2020-01-29 RX ORDER — PREDNISONE 20 MG/1
60 TABLET ORAL
Status: COMPLETED | OUTPATIENT
Start: 2020-01-29 | End: 2020-01-29

## 2020-01-29 RX ORDER — FUROSEMIDE 40 MG/1
40 TABLET ORAL 2 TIMES DAILY
Status: DISCONTINUED | OUTPATIENT
Start: 2020-01-29 | End: 2020-01-29

## 2020-01-29 RX ORDER — ONDANSETRON 2 MG/ML
4 INJECTION INTRAMUSCULAR; INTRAVENOUS
Status: DISCONTINUED | OUTPATIENT
Start: 2020-01-29 | End: 2020-02-02 | Stop reason: HOSPADM

## 2020-01-29 RX ORDER — AMMONIUM LACTATE 12 G/100G
1 LOTION TOPICAL AS NEEDED
COMMUNITY

## 2020-01-29 RX ORDER — LORATADINE 10 MG/1
10 TABLET ORAL DAILY
Status: DISCONTINUED | OUTPATIENT
Start: 2020-01-30 | End: 2020-02-02 | Stop reason: HOSPADM

## 2020-01-29 RX ORDER — ENOXAPARIN SODIUM 100 MG/ML
40 INJECTION SUBCUTANEOUS EVERY 24 HOURS
Status: DISCONTINUED | OUTPATIENT
Start: 2020-01-29 | End: 2020-01-29 | Stop reason: DRUGHIGH

## 2020-01-29 RX ORDER — HYDROXYZINE PAMOATE 25 MG/1
50 CAPSULE ORAL
Status: DISCONTINUED | OUTPATIENT
Start: 2020-01-29 | End: 2020-01-29

## 2020-01-29 RX ORDER — INSULIN GLARGINE 100 [IU]/ML
35 INJECTION, SOLUTION SUBCUTANEOUS DAILY
Status: DISCONTINUED | OUTPATIENT
Start: 2020-01-30 | End: 2020-02-01

## 2020-01-29 RX ORDER — NITROGLYCERIN 0.4 MG/1
0.4 TABLET SUBLINGUAL
Status: DISCONTINUED | OUTPATIENT
Start: 2020-01-29 | End: 2020-02-02 | Stop reason: HOSPADM

## 2020-01-29 RX ORDER — ENOXAPARIN SODIUM 100 MG/ML
40 INJECTION SUBCUTANEOUS EVERY 12 HOURS
Status: DISCONTINUED | OUTPATIENT
Start: 2020-01-29 | End: 2020-02-02 | Stop reason: HOSPADM

## 2020-01-29 RX ORDER — INSULIN LISPRO 100 [IU]/ML
INJECTION, SOLUTION INTRAVENOUS; SUBCUTANEOUS
Status: DISCONTINUED | OUTPATIENT
Start: 2020-01-29 | End: 2020-02-02 | Stop reason: HOSPADM

## 2020-01-29 RX ORDER — PRAVASTATIN SODIUM 40 MG/1
40 TABLET ORAL
Status: DISCONTINUED | OUTPATIENT
Start: 2020-01-29 | End: 2020-02-02 | Stop reason: HOSPADM

## 2020-01-29 RX ADMIN — FUROSEMIDE 40 MG: 40 TABLET ORAL at 20:46

## 2020-01-29 RX ADMIN — PREDNISONE 60 MG: 20 TABLET ORAL at 14:17

## 2020-01-29 RX ADMIN — INSULIN LISPRO 2 UNITS: 100 INJECTION, SOLUTION INTRAVENOUS; SUBCUTANEOUS at 23:02

## 2020-01-29 RX ADMIN — POTASSIUM CHLORIDE 10 MEQ: 750 TABLET, FILM COATED, EXTENDED RELEASE ORAL at 19:37

## 2020-01-29 RX ADMIN — METHYLPREDNISOLONE SODIUM SUCCINATE 40 MG: 40 INJECTION, POWDER, FOR SOLUTION INTRAMUSCULAR; INTRAVENOUS at 23:10

## 2020-01-29 RX ADMIN — METOPROLOL TARTRATE 25 MG: 25 TABLET ORAL at 19:37

## 2020-01-29 RX ADMIN — IPRATROPIUM BROMIDE AND ALBUTEROL SULFATE 3 ML: .5; 3 SOLUTION RESPIRATORY (INHALATION) at 14:40

## 2020-01-29 RX ADMIN — PRAVASTATIN SODIUM 40 MG: 40 TABLET ORAL at 23:02

## 2020-01-29 RX ADMIN — RANOLAZINE 500 MG: 500 TABLET, FILM COATED, EXTENDED RELEASE ORAL at 19:37

## 2020-01-29 RX ADMIN — Medication 10 ML: at 19:38

## 2020-01-29 RX ADMIN — ALBUTEROL SULFATE 5 MG: 2.5 SOLUTION RESPIRATORY (INHALATION) at 12:40

## 2020-01-29 RX ADMIN — FUROSEMIDE 40 MG: 40 TABLET ORAL at 19:36

## 2020-01-29 RX ADMIN — DOXYCYCLINE HYCLATE 100 MG: 100 TABLET, COATED ORAL at 23:02

## 2020-01-29 RX ADMIN — ARFORMOTEROL TARTRATE: 15 SOLUTION RESPIRATORY (INHALATION) at 21:26

## 2020-01-29 RX ADMIN — GLYBURIDE 5 MG: 5 TABLET ORAL at 23:33

## 2020-01-29 RX ADMIN — ENOXAPARIN SODIUM 40 MG: 40 INJECTION SUBCUTANEOUS at 23:02

## 2020-01-29 RX ADMIN — Medication 10 ML: at 23:03

## 2020-01-29 RX ADMIN — IPRATROPIUM BROMIDE 0.5 MG: 0.5 SOLUTION RESPIRATORY (INHALATION) at 12:40

## 2020-01-29 NOTE — PROGRESS NOTES
Pharmacy Clarification of the Prior to Admission Medication Regimen Retrospective to the Admission Medication Reconciliation    The patient was interviewed regarding clarification of the prior to admission medication regimen and was questioned regarding use of any other inhalers, topical products, over the counter medications, herbal medications, vitamin products or ophthalmic/nasal/otic medication use. Information Obtained From: RX Query, Patient    Recommendations/Findings: The following amendments were made to the patient's active medication list on file at [facility]:     1) Additions: None    2) Removals:   Hydroxyzine  Oxycodone  Prednisone  Ranexa    3) Changes:  insulin glargine (LANTUS) 100 unit/mL injection (Old regimen: 45 units QHS /New regimen: 35 units QHS)    4) Pertinent Pharmacy Findings: None     PTA medication list was corrected to the following:     Prior to Admission Medications   Prescriptions Last Dose Informant Taking? albuterol (VENTOLIN HFA) 90 mcg/actuation inhaler 2020 at Unknown time Self Yes   Sig: Take 2 Puffs by inhalation every six (6) hours as needed for Wheezing. albuterol-ipratropium (DUO-NEB) 2.5 mg-0.5 mg/3 ml nebu 2020 at Unknown time Self Yes   Sig: 3 mL by Nebulization route four (4) times daily. ammonium lactate (LAC-HYDRIN) 12 % topical cream 2020 at Unknown time Self Yes   Sig: rub in to affected area well   aspirin 81 mg chewable tablet 2020 at Unknown time Self Yes   Sig: Take 81 mg by mouth daily. cyclobenzaprine (FLEXERIL) 5 mg tablet 2020 at Unknown time Self Yes   Sig: Take 1 Tab by mouth two (2) times daily as needed for Muscle Spasm(s). flexeriol   fluticasone (FLONASE) 50 mcg/actuation nasal spray 2020 at Unknown time Self Yes   Si Sprays by Both Nostrils route daily. fluticasone-salmeterol (ADVAIR DISKUS) 500-50 mcg/dose diskus inhaler 2020 at Unknown time Self Yes   Sig: Take 1 Puff by inhalation daily. furosemide (LASIX) 40 mg tablet 2020 at Unknown time Self Yes   Sig: Take 40 mg by mouth two (2) times a day. glyBURIDE (DIABETA) 5 mg tablet 2020 at Unknown time Self Yes   Sig: Take 5 mg by mouth two (2) times daily (with meals). insulin glargine (LANTUS) 100 unit/mL injection 2020 at Unknown time Self Yes   Si Units by SubCUTAneous route daily. loratadine (CLARITIN) 10 mg tablet 2020 at Unknown time Self Yes   Sig: Take 10 mg by mouth daily. lovastatin (MEVACOR) 40 mg tablet 2020 at Unknown time Self Yes   Sig: Take 1 Tab by mouth nightly. metoprolol tartrate (LOPRESSOR) 25 mg tablet 2020 at Unknown time Self Yes   Sig: Take 25 mg by mouth two (2) times a day. nitroglycerin (NITROSTAT) 0.4 mg SL tablet Not Taking at Unknown time Self No   Si Tab by SubLINGual route every five (5) minutes as needed for Chest Pain (call 911 if not relieved by 3). nystatin (MYCOSTATIN) topical cream 2020 at Unknown time Self Yes   Sig: Apply  to affected area two (2) times a day. omeprazole (PRILOSEC) 40 mg capsule 2020 at Unknown time Self Yes   Sig: Take 40 mg by mouth daily. potassium chloride SR (KLOR-CON 10) 10 mEq tablet 2020 at Unknown time Self Yes   Sig: Take 10 mEq by mouth four (4) times daily.       Facility-Administered Medications: None          Thank you,  Noe Alejandra  Medication History Pharmacy Technician

## 2020-01-29 NOTE — ED PROVIDER NOTES
EMERGENCY DEPARTMENT HISTORY AND PHYSICAL EXAM      Date: 1/29/2020  Patient Name: Estelle Rocha    History of Presenting Illness     Chief Complaint   Patient presents with    Shortness of Breath    Cough       History Provided By: Patient    HPI: Estelle Rocha, 54 y.o. female with PMHx significant for COPD on 4 L, CHF, morbid obesity, CAD, who presents with a chief complaint of shortness of breath and productive cough for last several days. Patient also reports a runny nose and general malaise. Reports that she was seen by [facility] today and was referred to the emergency department for further management. No abdominal pain, nausea, vomiting, chest pain, fevers. PCP: Kelley David MD    There are no other complaints, changes, or physical findings at this time.     Current Facility-Administered Medications   Medication Dose Route Frequency Provider Last Rate Last Dose    [START ON 1/30/2020] aspirin chewable tablet 81 mg  81 mg Oral DAILY Patrick Faust MD        cyclobenzaprine (FLEXERIL) tablet 5 mg  5 mg Oral BID PRN Pama Prader, MD        [START ON 1/30/2020] fluticasone propionate (FLONASE) 50 mcg/actuation nasal spray 2 Spray  2 Spray Both Nostrils DAILY Patrick Faust MD        budesonide (PULMICORT) 1,000 mcg, arformoteroL (BROVANA) 15 mcg   Nebulization BID RT Pama Prader, MD        glyBURIDE (DIABETA) tablet 5 mg  5 mg Oral BID WITH MEALS Pama Prader, MD        Danny Sow ON 1/30/2020] loratadine (CLARITIN) tablet 10 mg  10 mg Oral DAILY Pama Prader, MD        pravastatin (PRAVACHOL) tablet 40 mg  40 mg Oral QHS Patrick Benoit MD        metoprolol tartrate (LOPRESSOR) tablet 25 mg  25 mg Oral BID Robb VALENCIA MD   25 mg at 01/29/20 1937    nitroglycerin (NITROSTAT) tablet 0.4 mg  0.4 mg SubLINGual Q5MIN PRN Pama Prader, MD        [START ON 1/30/2020] pantoprazole (PROTONIX) tablet 40 mg  40 mg Oral ACB Pama Prader, MD        oxyCODONE IR (Dorthealla Drew) [facility]  [facility]... WANTS TO KNOW IF MEDICATION IS NEEDED PRIOR TO TREATMENT  PHONE: [phone]   tablet 5 mg  5 mg Oral Q4H PRN Krysten Lu MD        potassium chloride SR (KLOR-CON 10) tablet 10 mEq  10 mEq Oral QID Krysten Lu MD   10 mEq at 01/29/20 1937    ranolazine ER (RANEXA) tablet 500 mg  500 mg Oral BID Krysten Lu MD   500 mg at 01/29/20 1937    sodium chloride (NS) flush 5-40 mL  5-40 mL IntraVENous Q8H Krysten Lu MD   10 mL at 01/29/20 1938    sodium chloride (NS) flush 5-40 mL  5-40 mL IntraVENous PRN Krysten Lu MD        acetaminophen (TYLENOL) tablet 650 mg  650 mg Oral Q4H PRN Krysten Lu MD        ondansetron [facility]) injection 4 mg  4 mg IntraVENous Q4H PRN Krysten Lu MD        methylPREDNISolone (PF) (SOLU-MEDROL) injection 40 mg  40 mg IntraVENous Q6H Krysten Lu MD   Stopped at 01/29/20 1937    doxycycline (VIBRA-TABS) tablet 100 mg  100 mg Oral Q12H Krysten Lu MD        insulin lispro (HUMALOG) injection   SubCUTAneous AC&HS Krysten Lu MD        glucose chewable tablet 16 g  4 Tab Oral PRN Krysten Lu MD        glucagon (GLUCAGEN) injection 1 mg  1 mg IntraMUSCular PRN Krysten Lu MD        dextrose 10% infusion 0-250 mL  0-250 mL IntraVENous PRN Krysten Lu MD        albuterol-ipratropium (DUO-NEB) 2.5 MG-0.5 MG/3 ML  3 mL Nebulization QID RT Krysten Lu MD        furosemide (LASIX) tablet 40 mg  40 mg Oral ACB&D Krystne Lu MD   40 mg at 01/29/20 2046    enoxaparin (LOVENOX) injection 40 mg  40 mg SubCUTAneous Q12H MD Uma Eastman ON 1/30/2020] insulin glargine (LANTUS) injection 35 Units  35 Units SubCUTAneous DAILY Krysten Lu MD        hydroxyzine HCL (ATARAX) tablet 50 mg  50 mg Oral Q8H PRN Krysten Lu MD        glyBURIDE (DIABETA) tablet 5 mg  5 mg Oral ONCE Krysten Lu MD         Past History     Past Medical History:  Past Medical History:   Diagnosis Date    Arthritis     Asthma     Breast lump     CAD (coronary artery disease)     Cancer ([facility] [address]     breast cancer    Congestive heart failure (HCC)     COPD [facility]chronic obstructive [facility] disease) [facility] [address]     Diabetes ([facility]     Hypertension     Morbid obesity with BMI of 70 and over, adult ([facility]     NSTEMI (non-ST elevated myocardial infarction) ([facility]     [facility]ra[facility]ricular tachycardia) ([facility])      Past Surgical History:  Past Surgical History:   Procedure Laterality Date    CARDIAC SURG PROCEDURE UNLIST      Stents    HX  SECTION      HX ORTHOPAEDIC      HX OTHER SURGICAL      cyst removed from back     Family History:  Family History   Problem Relation Age of Onset    Heart Disease Mother     Heart Disease Father     Heart Disease Sister     Breast Cancer Maternal Grandmother     Breast Cancer Paternal Grandmother      Social History:  Social History     Tobacco Use    Smoking status: Former Smoker     Packs/day: 0.25     Types: Cigarettes    Smokeless tobacco: Never Used    Tobacco comment: Patient states \"I  aint smoking no more d*mn cigarettes after yesterday\" 2018   Substance Use Topics    Alcohol use: No    Drug use: No     Allergies:  No Known Allergies  Review of Systems   Review of Systems   Constitutional: Negative for chills and fever. HENT: Positive for congestion and rhinorrhea. Negative for sore throat. Respiratory: Positive for cough and shortness of breath. Cardiovascular: Negative for chest pain. Gastrointestinal: Negative for abdominal pain, nausea and vomiting. Genitourinary: Negative for dysuria and urgency. Skin: Negative for rash. Neurological: Negative for dizziness, light-headedness and headaches. All other systems reviewed and are negative. Physical Exam   Physical Exam  Vitals signs and nursing note reviewed. Constitutional:       General: She is not in acute distress. Appearance: She is well-developed. She is obese. Comments: Chronically ill-appearing   HENT:      Head: Normocephalic and atraumatic.    Eyes:      Conjunctiva/sclera: Conjunctivae normal.      Pupils: Pupils are equal, round, and reactive to light. Neck:      Musculoskeletal: Normal range of motion. Cardiovascular:      Rate and Rhythm: Regular rhythm. Tachycardia present. Pulmonary:      Effort: Pulmonary effort is normal. No respiratory distress. Breath sounds: Normal breath sounds. No stridor. Comments: Diffusely diminished breath sounds  Able to speak in complete sentences  Abdominal:      General: There is no distension. Palpations: Abdomen is soft. Tenderness: There is no abdominal tenderness. Musculoskeletal: Normal range of motion. Right lower leg: Edema present. Left lower leg: Edema present. Comments: Chronic skin changes to the bilateral lower extremities   Skin:     General: Skin is warm and dry. Neurological:      Mental Status: She is alert and oriented to person, place, and time. Diagnostic Study Results   Labs -     Recent Results (from the past 12 hour(s))   GLUCOSE, POC    Collection Time: 01/29/20  2:21 PM   Result Value Ref Range    Glucose (POC) 212 (H) 65 - 100 mg/dL    Performed by Juan Dennis RN (traveler)    CBC WITH AUTOMATED DIFF    Collection Time: 01/29/20  2:23 PM   Result Value Ref Range    WBC 15.2 (H) 3.6 - 11.0 K/uL    RBC 5.29 (H) 3.80 - 5.20 M/uL    HGB 14.6 11.5 - 16.0 g/dL    HCT 49.3 (H) 35.0 - 47.0 %    MCV 93.2 80.0 - 99.0 FL    MCH 27.6 26.0 - 34.0 PG    MCHC 29.6 (L) 30.0 - 36.5 g/dL    RDW 13.1 11.5 - 14.5 %    PLATELET 161 602 - 471 K/uL    MPV 10.9 8.9 - 12.9 FL    NRBC 0.0 0  WBC    ABSOLUTE NRBC 0.00 0.00 - 0.01 K/uL    NEUTROPHILS 93 (H) 32 - 75 %    LYMPHOCYTES 6 (L) 12 - 49 %    MONOCYTES 1 (L) 5 - 13 %    EOSINOPHILS 0 0 - 7 %    BASOPHILS 0 0 - 1 %    IMMATURE GRANULOCYTES 0 0.0 - 0.5 %    ABS. NEUTROPHILS 14.1 (H) 1.8 - 8.0 K/UL    ABS. LYMPHOCYTES 0.9 0.8 - 3.5 K/UL    ABS. MONOCYTES 0.2 0.0 - 1.0 K/UL    ABS. EOSINOPHILS 0.0 0.0 - 0.4 K/UL    ABS.  BASOPHILS 0.0 0.0 - 0.1 K/UL ABS. IMM. GRANS. 0.0 0.00 - 0.04 K/UL    DF MANUAL      RBC COMMENTS NORMOCYTIC, NORMOCHROMIC     METABOLIC PANEL, COMPREHENSIVE    Collection Time: 01/29/20  2:23 PM   Result Value Ref Range    Sodium 137 136 - 145 mmol/L    Potassium 5.1 3.5 - 5.1 mmol/L    Chloride 100 97 - 108 mmol/L    CO2 33 (H) 21 - 32 mmol/L    Anion gap 4 (L) 5 - 15 mmol/L    Glucose 209 (H) 65 - 100 mg/dL    BUN 15 6 - 20 MG/DL    Creatinine 0.76 0.55 - 1.02 MG/DL    BUN/Creatinine ratio 20 12 - 20      GFR est AA >60 >60 ml/min/1.73m2    GFR est non-AA >60 >60 ml/min/1.73m2    Calcium 11.1 (H) 8.5 - 10.1 MG/DL    Bilirubin, total 0.5 0.2 - 1.0 MG/DL    ALT (SGPT) 24 12 - 78 U/L    AST (SGOT) 27 15 - 37 U/L    Alk. phosphatase 118 (H) 45 - 117 U/L    Protein, total 9.0 (H) 6.4 - 8.2 g/dL    Albumin 3.6 3.5 - 5.0 g/dL    Globulin 5.4 (H) 2.0 - 4.0 g/dL    A-G Ratio 0.7 (L) 1.1 - 2.2     GLUCOSE, POC    Collection Time: 01/29/20  9:12 PM   Result Value Ref Range    Glucose (POC) 243 (H) 65 - 100 mg/dL    Performed by Leatha Justin        Radiologic Studies -   XR CHEST PORT   Final Result   Impression:   Unchanged mild cardiomegaly        Xr Chest Port    Result Date: 1/29/2020  Impression: Unchanged mild cardiomegaly    Medical Decision Making   I am the first provider for this patient. I reviewed the vital signs, available nursing notes, past medical history, past surgical history, family history and social history. Vital Signs-Reviewed the patient's vital signs.   Patient Vitals for the past 12 hrs:   Temp Pulse Resp BP SpO2   01/29/20 1836 97.3 °F (36.3 °C) (!) 112  (!) 165/100 90 %   01/29/20 1800  (!) 101 23 (!) 168/94 (!) 87 %   01/29/20 1645  (!) 110 26 (!) 142/100 (!) 84 %   01/29/20 1630  (!) 113 21 102/58 (!) 88 %   01/29/20 1600  (!) 112 27 (!) 136/91 97 %   01/29/20 1500  (!) 105 18  99 %   01/29/20 1440     93 %   01/29/20 1430  (!) 114 24 110/76 90 %   01/29/20 1427  (!) 112 24  94 %   01/29/20 1400  (!) 109 21  95 %   01/29/20 1330  (!) 112 20 112/78 93 %   01/29/20 1300  (!) 109 19 110/68 90 %   01/29/20 1240     90 %   01/29/20 1230  (!) 110 20 104/86 (!) 87 %   01/29/20 1207 97.7 °F (36.5 °C) (!) 108 26 150/85 (!) 88 %       Pulse Oximetry Analysis - 88% on 4L      Records Reviewed: Nursing Notes and Old Medical Records    Provider Notes (Medical Decision Making):   Patient presents the chief complaint of cough, shortness of breath, rhinorrhea, and malaise. On exam, patient is chronically ill-appearing but does not appear to be in any acute distress. She is able to speak in full and complete sentences. ED Course:   Initial assessment performed. The patients presenting problems have been discussed, and they are in agreement with the care plan formulated and outlined with them. I have encouraged them to ask questions as they arise throughout their visit. Despite nebs and steroids, patient hypoxic on her home O2. Will discuss with hospitalist for admission. ED Course as of Jan 29 2149 Wed Jan 29, 2020   1647 Spoke with Dr. Viktor Dumont, hospitalist, will see for admission    [PATTI]      ED Course User Index  Cali Christian MD       Critical Care:  CRITICAL CARE NOTE :  4:53 PM  IMPENDING DETERIORATION -Airway and Respiratory  ASSOCIATED RISK FACTORS - Hypotension and Hypoxia  MANAGEMENT- Bedside Assessment  INTERPRETATION -  Xrays and Blood Pressure  INTERVENTIONS - nebs  CASE REVIEW - Hospitalist  TREATMENT RESPONSE -Unchanged   PERFORMED BY - Self    NOTES   :    I have spent 32 minutes of critical care time involved in lab review, consultations with specialist, family decision- making, bedside attention and documentation. During this entire length of time I was immediately available to the patient .   Joshua Mcihel MD      Disposition:    Admission Note:  Patient is being admitted to the hospital by Dr. Meg Vera, Service: Hospitalist.  The results of their tests and reasons for their admission have been discussed with them and available family. They convey agreement and understanding for the need to be admitted and for their admission diagnosis. Diagnosis     Clinical Impression:   1. Acute exacerbation of chronic obstructive pulmonary disease (COPD) ([facility] [address]        This note will not be viewable in [employer]. Please note that this dictation was completed with [employer], the computer voice recognition software. Quite often unanticipated grammatical, syntax, homophones, and other interpretive errors are inadvertently transcribed by the computer software. Please disregard these errors.   Please excuse any errors that have escaped final proofreading

## 2020-01-29 NOTE — ED NOTES
Patient remains sitting up in chair, 02 reduced to 4 l. Per home treatment, patient requested food, box lunch given to pateint.

## 2020-01-29 NOTE — ED NOTES
Report called to unit, RN English, all questions answered.  Patient transported via wheelchair with 2 assist.

## 2020-01-29 NOTE — PROGRESS NOTES
TRANSFER - IN REPORT:    Verbal report received from [place] (name) on Rosamaria Kingston  being received from cd32(unit) for routine progression of care      Report consisted of patients Situation, Background, Assessment and   Recommendations(SBAR). Information from the following report(s) SBAR and ED Summary was reviewed with the receiving nurse. Opportunity for questions and clarification was provided. Assessment completed upon patients arrival to unit and care assumed.

## 2020-01-29 NOTE — H&P
Hospitalist Admission Note    NAME: Hilaria Trinidad   :  1970   MRN:  [id number]     Date/Time:  2020 5:27 PM    Patient PCP: Chuy New MD  ______________________________________________________________________  Given the patient's current clinical presentation, I have a high level of concern for decompensation if discharged from the emergency department. Complex decision making was performed, which includes reviewing the patient's available past medical records, laboratory results, and x-ray films. My assessment of this patient's clinical condition and my plan of care is as follows.     Assessment / Plan:    Sepsis by criteria/acute bronchitis  Indicators of sepsis are tachycardia, tachypnea and leukocytosis  Source of infection is likely acute bronchitis as patient reports thick greenish sputum production and shortness of breath  Chest x-ray did not show any evidence of pneumonia  We will check lactic acid level  Patient will be started on doxycycline for acute bronchitis  Will check procalcitonin  Will obtain blood in sputum cultures  We will avoid giving fluids due to history of heart failure  Blood pressure is stable at this point    Acute on chronic hypoxic and hypercapnic respiratory failure/COPD exacerbation  Likely secondary acute bronchitis  Management of acute bronchitis as above  Add Solu-Medrol 40 every 6  We will add duo nebs 4 times a day  Maintain oxygenation at 92%  Patient uses 4 L at home and is needing up to 6 L  She also has conversational dyspnea    Chronic diastolic CHF  Clinically patient does not seem to be in CHF exacerbation  Continue Lasix 40 mg twice daily  I's and O's  Daily weight    Insulin-dependent type 2 diabetes:  Continue home dose of Lantus  Continue glyburide  Add sliding scale insulin    Hypertension  Continue metoprolol    Coronary artery disease  Continue aspirin, statin, beta-blocker and Ranexa    Severely morbid obesity:  Due to excess caloric intake    L2 sclerotic lesion/history of breast cancer:  Patient has a PET scan ordered for this coming Monday  She has an outpatient follow-up with her oncologist      Code Status: Full code  Surrogate Decision Maker: Cecile [phone]), Monique Geronimo(son [phone])    DVT Prophylaxis: Lovenox   GI Prophylaxis: not indicated    Baseline: Very limited mobility due to morbid obesity and chronic shortness of breath      Subjective:   CHIEF COMPLAINT: Shortness of breath for 1 day    HISTORY OF PRESENT ILLNESS:     Zia Snider is a 54 y.o.  female with past medical history significant for COPD with chronic hypoxic and hypercapnic respiratory failure requiring 4 L oxygen at her baseline with noncompliance to the BiPAP as she cannot tolerate it, chronic CHF, insulin-dependent type 2 diabetes, severely morbid obesity, hypertension and coronary artery disease who had multiple hospitalization due to COPD exacerbation who presents with complaints of shortness of breath for 1 day. Patient reports since yesterday she has been having cough productive of yellow sputum and increasing work of breathing with shortness of breath. She uses 4 L oxygen at her baseline [facility] with oxygen saturation in low 80s on 4 L. She denies any orthopnea but reports exertional dyspnea. Patient denies any subjective fever or chills. She denies any worsening lower extremity edema. In the ED she was noted to be hypoxic in low 80s on 4 L and oxygen saturation improved to low 90s on 6 L. He is also noted to have leukocytosis, tachycardia and tachypnea.   She was given steroids and breathing treatments in the ED with little to no improvement  Patient denies any chest pain, palpitations, subjective fever or chills, nausea, vomiting, diarrhea, urinary consistency dysuria, heat or cold intolerance    We were asked to admit for work up and evaluation of the above problems. Past Medical History:   Diagnosis Date    Arthritis     Asthma     Breast lump     CAD (coronary artery disease)     Cancer (HCC)     breast cancer    Congestive heart failure (HCC)     COPD (chronic obstructive pulmonary disease) (HCC)     Diabetes ([facility] [place] [facility]     Hypertension     Morbid obesity with BMI of 70 and over, adult ([facility] [place] [facility]     [facility]     SVT (supraventricular tachycardia) ([facility] [place] [facility]         Past Surgical History:   Procedure Laterality Date    CARDIAC SURG PROCEDURE UNLIST      Stents    HX  SECTION      HX ORTHOPAEDIC      HX OTHER SURGICAL      cyst removed from back       Social History     Tobacco Use    Smoking status: Former Smoker     Packs/day: 0.25     Types: Cigarettes    Smokeless tobacco: Never Used    Tobacco comment: Patient states \"I  aint smoking no more d*mn cigarettes after yesterday\" 2018   Substance Use Topics    Alcohol use: No        Family History   Problem Relation Age of Onset    Heart Disease Mother     Heart Disease Father     Heart Disease Sister     Breast Cancer Maternal Grandmother     Breast Cancer Paternal Grandmother    Reviewed  No Known Allergies     Prior to Admission medications    Medication Sig Start Date End Date Taking? Authorizing Provider   fluticasone (FLONASE) 50 mcg/actuation nasal spray 2 Sprays by Both Nostrils route daily. 19  Yes Prabhu Sanchez MD   predniSONE (DELTASONE) 20 mg tablet 3 tabs daily for 3 days   2 tabs daily for 3 days   1 tab   daily for 3 days 19   Cheryl Fuller MD   omeprazole (PRILOSEC) 40 mg capsule Take 40 mg by mouth daily. Provider, Historical   potassium chloride SR (KLOR-CON 10) 10 mEq tablet Take 10 mEq by mouth four (4) times daily. Provider, Historical   oxyCODONE IR (ROXICODONE) 5 mg immediate release tablet Take 5 mg by mouth every four (4) hours as needed for Pain.     Provider, Historical   nystatin (MYCOSTATIN) topical cream Apply  to affected area two (2) times a day. 9/16/19   Rich Roque MD   ranolazine ER (RANEXA) 500 mg SR tablet Take 1 Tab by mouth two (2) times a day. 4/8/19   Meriam Friday, MARLEEN   cyclobenzaprine (FLEXERIL) 5 mg tablet Take 1 Tab by mouth two (2) times daily as needed for Muscle Spasm(s). flexeriol 3/17/19   Astrid Almazan NP   albuterol-ipratropium (DUO-NEB) 2.5 mg-0.5 mg/3 ml nebu 3 mL by Nebulization route four (4) times daily. 3/3/19   Miguel Angel Adler MD   furosemide (LASIX) 40 mg tablet Take 40 mg by mouth two (2) times a day. Other, MD Landon   insulin glargine (LANTUS) 100 unit/mL injection 45 Units by SubCUTAneous route daily. Provider, Historical   metoprolol tartrate (LOPRESSOR) 25 mg tablet Take 25 mg by mouth two (2) times a day. Provider, Historical   fluticasone-salmeterol (ADVAIR DISKUS) 500-50 mcg/dose diskus inhaler Take 1 Puff by inhalation daily. Provider, Historical   loratadine (CLARITIN) 10 mg tablet Take 10 mg by mouth daily. Provider, Historical   albuterol (VENTOLIN HFA) 90 mcg/actuation inhaler Take 2 Puffs by inhalation every six (6) hours as needed for Wheezing. Provider, Historical   aspirin 81 mg chewable tablet Take 81 mg by mouth daily. Provider, Historical   hydrOXYzine pamoate (VISTARIL) 50 mg capsule Take 50 mg by mouth every eight (8) hours as needed for Itching. Provider, Historical   lovastatin (MEVACOR) 40 mg tablet Take 1 Tab by mouth nightly. 1/14/16   Carlos Mueller NP   glyBURIDE (DIABETA) 5 mg tablet Take 5 mg by mouth two (2) times daily (with meals). Provider, Historical   ammonium lactate (LAC-HYDRIN) 12 % topical cream rub in to affected area well 11/21/14   Rylie Song MD   nitroglycerin (NITROSTAT) 0.4 mg SL tablet 1 Tab by SubLINGual route every five (5) minutes as needed for Chest Pain (call 911 if not relieved by 3).  9/12/13   Carlos Mueller NP       REVIEW OF SYSTEMS:     I am not able to complete the review of systems because: The patient is intubated and sedated    The patient has altered mental status due to his acute medical problems    The patient has baseline aphasia from prior stroke(s)    The patient has baseline dementia and is not reliable historian    The patient is in acute medical distress and unable to provide information           Total of 12 systems reviewed as follows:       POSITIVE= underlined text  Negative = text not underlined  General:  fever, chills, sweats, generalized weakness, weight loss/gain,      loss of appetite   Eyes:    blurred vision, eye pain, loss of vision, double vision  ENT:    rhinorrhea, pharyngitis   Respiratory:   cough, sputum production, SOB, RAZO, wheezing, pleuritic pain   Cardiology:   chest pain, palpitations, orthopnea, PND, edema, syncope   Gastrointestinal:  abdominal pain , N/V, diarrhea, dysphagia, constipation, bleeding   Genitourinary:  frequency, urgency, dysuria, hematuria, incontinence   Muskuloskeletal :  arthralgia, myalgia, back pain  Hematology:  easy bruising, nose or gum bleeding, lymphadenopathy   Dermatological: rash, ulceration, pruritis, color change / jaundice  Endocrine:   hot flashes or polydipsia   Neurological:  headache, dizziness, confusion, focal weakness, paresthesia,     Speech difficulties, memory loss, gait difficulty  Psychological: Feelings of anxiety, depression, agitation    Objective:   VITALS:    Visit Vitals  BP (!) 142/100   Pulse (!) 110   Temp 97.7 °F (36.5 °C)   Resp 26   Ht 5' 7\" (1.702 m)   Wt (!) 194.6 kg (429 lb)   SpO2 (!) 84%   BMI 67.19 kg/m²       PHYSICAL EXAM:    General:    Alert, cooperative, mild conversational dyspnea, appears stated age. HEENT: Atraumatic, anicteric sclerae, pink conjunctivae     No oral ulcers, mucosa moist, throat clear, dentition fair  Neck:  Supple, symmetrical,  thyroid: non tender  Lungs:   Air entry is poor in all the lung fields, no Wheezing or Rhonchi. No rales.   Chest wall: No tenderness  No Accessory muscle use. Heart:   Regular  rhythm,  No  murmur   No edema  Abdomen:   Soft, non-tender. Not distended. Bowel sounds normal, morbidly obese abdomen  Extremities: No cyanosis. No clubbing,      Skin turgor normal, Capillary refill normal, Radial dial pulse 2+, signs of chronic changes in bilateral lower extremities with thick keratinization in bilateral lower extremities  Skin:     Not pale. Not Jaundiced  No rashes   Psych:  Good insight. Not depressed. Not anxious or agitated. Neurologic: EOMs intact. No facial asymmetry. No aphasia or slurred speech. Symmetrical strength, Sensation grossly intact. Alert and oriented X 4.     _______________________________________________________________________  Care Plan discussed with:    Comments   Patient x    Family      RN     Care Manager                    Consultant:      _______________________________________________________________________  Expected  Disposition:   Home with Family x   HH/PT/OT/RN    SNF/LTC    LUIS A    ________________________________________________________________________  TOTAL TIME:  43 Minutes    Critical Care Provided     Minutes non procedure based      Comments     Reviewed previous records   >50% of visit spent in counseling and coordination of care  Discussion with patient and/or family and questions answered       ________________________________________________________________________  Signed: Samaria Fouzia, MD    Procedures: see electronic medical records for all procedures/Xrays and details which were not copied into this note but were reviewed prior to creation of Plan.     LAB DATA REVIEWED:    Recent Results (from the past 24 hour(s))   GLUCOSE, POC    Collection Time: 01/29/20  2:21 PM   Result Value Ref Range    Glucose (POC) 212 (H) 65 - 100 mg/dL    Performed by Etienne Currie RN (traveler)    CBC WITH AUTOMATED DIFF    Collection Time: 01/29/20  2:23 PM   Result Value Ref Range    WBC 15.2 (H) 3.6 - 11.0 K/uL    RBC 5.29 (H) 3.80 - 5.20 M/uL    HGB 14.6 11.5 - 16.0 g/dL    HCT 49.3 (H) 35.0 - 47.0 %    MCV 93.2 80.0 - 99.0 FL    MCH 27.6 26.0 - 34.0 PG    MCHC 29.6 (L) 30.0 - 36.5 g/dL    RDW 13.1 11.5 - 14.5 %    PLATELET 369 747 - 648 K/uL    MPV 10.9 8.9 - 12.9 FL    NRBC 0.0 0  WBC    ABSOLUTE NRBC 0.00 0.00 - 0.01 K/uL    NEUTROPHILS 93 (H) 32 - 75 %    LYMPHOCYTES 6 (L) 12 - 49 %    MONOCYTES 1 (L) 5 - 13 %    EOSINOPHILS 0 0 - 7 %    BASOPHILS 0 0 - 1 %    IMMATURE GRANULOCYTES 0 0.0 - 0.5 %    ABS. NEUTROPHILS 14.1 (H) 1.8 - 8.0 K/UL    ABS. LYMPHOCYTES 0.9 0.8 - 3.5 K/UL    ABS. MONOCYTES 0.2 0.0 - 1.0 K/UL    ABS. EOSINOPHILS 0.0 0.0 - 0.4 K/UL    ABS. BASOPHILS 0.0 0.0 - 0.1 K/UL    ABS. IMM. GRANS. 0.0 0.00 - 0.04 K/UL    DF MANUAL      RBC COMMENTS NORMOCYTIC, NORMOCHROMIC     METABOLIC PANEL, COMPREHENSIVE    Collection Time: 01/29/20  2:23 PM   Result Value Ref Range    Sodium 137 136 - 145 mmol/L    Potassium 5.1 3.5 - 5.1 mmol/L    Chloride 100 97 - 108 mmol/L    CO2 33 (H) 21 - 32 mmol/L    Anion gap 4 (L) 5 - 15 mmol/L    Glucose 209 (H) 65 - 100 mg/dL    BUN 15 6 - 20 MG/DL    Creatinine 0.76 0.55 - 1.02 MG/DL    BUN/Creatinine ratio 20 12 - 20      GFR est AA >60 >60 ml/min/1.73m2    GFR est non-AA >60 >60 ml/min/1.73m2    Calcium 11.1 (H) 8.5 - 10.1 MG/DL    Bilirubin, total 0.5 0.2 - 1.0 MG/DL    ALT (SGPT) 24 12 - 78 U/L    AST (SGOT) 27 15 - 37 U/L    Alk.  phosphatase 118 (H) 45 - 117 U/L    Protein, total 9.0 (H) 6.4 - 8.2 g/dL    Albumin 3.6 3.5 - 5.0 g/dL    Globulin 5.4 (H) 2.0 - 4.0 g/dL    A-G Ratio 0.7 (L) 1.1 - 2.2

## 2020-01-29 NOTE — ED NOTES
Patient continue sitting up in chair, md made aware of patient status, 02 increased due to sat drop.  New orders noted

## 2020-01-29 NOTE — ED TRIAGE NOTES
Received patient from ems, pateint is awake. Patient request to sit in chair. Patient reports she has been short of breath x 2 days. She does wear o2 at home, however she is now coughing up white sputum and tan.

## 2020-01-30 LAB
ANION GAP SERPL CALC-SCNC: 3 MMOL/L (ref 5–15)
APPEARANCE UR: CLEAR
BILIRUB UR QL: NEGATIVE
BUN SERPL-MCNC: 18 MG/DL (ref 6–20)
BUN/CREAT SERPL: 18 (ref 12–20)
CALCIUM SERPL-MCNC: 10.6 MG/DL (ref 8.5–10.1)
CHLORIDE SERPL-SCNC: 99 MMOL/L (ref 97–108)
CO2 SERPL-SCNC: 34 MMOL/L (ref 21–32)
COLOR UR: NORMAL
CREAT SERPL-MCNC: 1.01 MG/DL (ref 0.55–1.02)
ERYTHROCYTE [DISTWIDTH] IN BLOOD BY AUTOMATED COUNT: 13.2 % (ref 11.5–14.5)
GLUCOSE BLD STRIP.AUTO-MCNC: 187 MG/DL (ref 65–100)
GLUCOSE BLD STRIP.AUTO-MCNC: 198 MG/DL (ref 65–100)
GLUCOSE BLD STRIP.AUTO-MCNC: 252 MG/DL (ref 65–100)
GLUCOSE SERPL-MCNC: 256 MG/DL (ref 65–100)
GLUCOSE UR STRIP.AUTO-MCNC: NEGATIVE MG/DL
HCT VFR BLD AUTO: 45.1 % (ref 35–47)
HGB BLD-MCNC: 13.6 G/DL (ref 11.5–16)
HGB UR QL STRIP: NEGATIVE
KETONES UR QL STRIP.AUTO: NEGATIVE MG/DL
LACTATE SERPL-SCNC: 1.6 MMOL/L (ref 0.4–2)
LEUKOCYTE ESTERASE UR QL STRIP.AUTO: NEGATIVE
MCH RBC QN AUTO: 27.9 PG (ref 26–34)
MCHC RBC AUTO-ENTMCNC: 30.2 G/DL (ref 30–36.5)
MCV RBC AUTO: 92.4 FL (ref 80–99)
NITRITE UR QL STRIP.AUTO: NEGATIVE
NRBC # BLD: 0 K/UL (ref 0–0.01)
NRBC BLD-RTO: 0 PER 100 WBC
PH UR STRIP: 5.5 [PH] (ref 5–8)
PLATELET # BLD AUTO: 96 K/UL (ref 150–400)
PMV BLD AUTO: 11.6 FL (ref 8.9–12.9)
POTASSIUM SERPL-SCNC: 4.7 MMOL/L (ref 3.5–5.1)
PROCALCITONIN SERPL-MCNC: 0.1 NG/ML
PROT UR STRIP-MCNC: NEGATIVE MG/DL
RBC # BLD AUTO: 4.88 M/UL (ref 3.8–5.2)
SERVICE CMNT-IMP: ABNORMAL
SODIUM SERPL-SCNC: 136 MMOL/L (ref 136–145)
SP GR UR REFRACTOMETRY: 1.02 (ref 1–1.03)
UROBILINOGEN UR QL STRIP.AUTO: 1 EU/DL (ref 0.2–1)
WBC # BLD AUTO: 12.4 K/UL (ref 3.6–11)

## 2020-01-30 PROCEDURE — 74011250636 HC RX REV CODE- 250/636: Performed by: INTERNAL MEDICINE

## 2020-01-30 PROCEDURE — 82962 GLUCOSE BLOOD TEST: CPT

## 2020-01-30 PROCEDURE — 65660000000 HC RM CCU STEPDOWN

## 2020-01-30 PROCEDURE — 36415 COLL VENOUS BLD VENIPUNCTURE: CPT

## 2020-01-30 PROCEDURE — 74011636637 HC RX REV CODE- 636/637: Performed by: INTERNAL MEDICINE

## 2020-01-30 PROCEDURE — 74011000250 HC RX REV CODE- 250: Performed by: INTERNAL MEDICINE

## 2020-01-30 PROCEDURE — 84145 PROCALCITONIN (PCT): CPT

## 2020-01-30 PROCEDURE — 85027 COMPLETE CBC AUTOMATED: CPT

## 2020-01-30 PROCEDURE — 87070 CULTURE OTHR SPECIMN AEROBIC: CPT

## 2020-01-30 PROCEDURE — 80048 BASIC METABOLIC PNL TOTAL CA: CPT

## 2020-01-30 PROCEDURE — 87040 BLOOD CULTURE FOR BACTERIA: CPT

## 2020-01-30 PROCEDURE — 74011250637 HC RX REV CODE- 250/637: Performed by: INTERNAL MEDICINE

## 2020-01-30 PROCEDURE — 81003 URINALYSIS AUTO W/O SCOPE: CPT

## 2020-01-30 PROCEDURE — 94640 AIRWAY INHALATION TREATMENT: CPT

## 2020-01-30 PROCEDURE — 83605 ASSAY OF LACTIC ACID: CPT

## 2020-01-30 RX ORDER — MICONAZOLE NITRATE 20 MG/G
CREAM TOPICAL 2 TIMES DAILY
Status: DISCONTINUED | OUTPATIENT
Start: 2020-01-30 | End: 2020-02-02 | Stop reason: HOSPADM

## 2020-01-30 RX ORDER — ARFORMOTEROL TARTRATE 15 UG/2ML
15 SOLUTION RESPIRATORY (INHALATION)
Status: DISCONTINUED | OUTPATIENT
Start: 2020-01-30 | End: 2020-02-02 | Stop reason: HOSPADM

## 2020-01-30 RX ORDER — AMMONIUM LACTATE 12 G/100G
LOTION TOPICAL 2 TIMES DAILY
Status: DISCONTINUED | OUTPATIENT
Start: 2020-01-30 | End: 2020-02-02 | Stop reason: HOSPADM

## 2020-01-30 RX ORDER — BUDESONIDE 0.5 MG/2ML
1000 INHALANT ORAL
Status: DISCONTINUED | OUTPATIENT
Start: 2020-01-30 | End: 2020-02-02 | Stop reason: HOSPADM

## 2020-01-30 RX ADMIN — DOXYCYCLINE HYCLATE 100 MG: 100 TABLET, COATED ORAL at 09:55

## 2020-01-30 RX ADMIN — METOPROLOL TARTRATE 25 MG: 25 TABLET ORAL at 09:55

## 2020-01-30 RX ADMIN — MICONAZOLE NITRATE: 20 CREAM TOPICAL at 17:49

## 2020-01-30 RX ADMIN — Medication: at 17:49

## 2020-01-30 RX ADMIN — METHYLPREDNISOLONE SODIUM SUCCINATE 40 MG: 40 INJECTION, POWDER, FOR SOLUTION INTRAMUSCULAR; INTRAVENOUS at 23:37

## 2020-01-30 RX ADMIN — RANOLAZINE 500 MG: 500 TABLET, FILM COATED, EXTENDED RELEASE ORAL at 09:54

## 2020-01-30 RX ADMIN — FUROSEMIDE 40 MG: 40 TABLET ORAL at 09:55

## 2020-01-30 RX ADMIN — INSULIN LISPRO 3 UNITS: 100 INJECTION, SOLUTION INTRAVENOUS; SUBCUTANEOUS at 21:23

## 2020-01-30 RX ADMIN — IPRATROPIUM BROMIDE AND ALBUTEROL SULFATE 3 ML: .5; 3 SOLUTION RESPIRATORY (INHALATION) at 08:30

## 2020-01-30 RX ADMIN — Medication 10 ML: at 06:06

## 2020-01-30 RX ADMIN — CYCLOBENZAPRINE 5 MG: 10 TABLET, FILM COATED ORAL at 17:48

## 2020-01-30 RX ADMIN — METHYLPREDNISOLONE SODIUM SUCCINATE 40 MG: 40 INJECTION, POWDER, FOR SOLUTION INTRAMUSCULAR; INTRAVENOUS at 14:16

## 2020-01-30 RX ADMIN — FUROSEMIDE 40 MG: 40 TABLET ORAL at 17:48

## 2020-01-30 RX ADMIN — Medication 10 ML: at 14:16

## 2020-01-30 RX ADMIN — METOPROLOL TARTRATE 25 MG: 25 TABLET ORAL at 17:48

## 2020-01-30 RX ADMIN — PANTOPRAZOLE SODIUM 40 MG: 40 TABLET, DELAYED RELEASE ORAL at 09:54

## 2020-01-30 RX ADMIN — INSULIN LISPRO 2 UNITS: 100 INJECTION, SOLUTION INTRAVENOUS; SUBCUTANEOUS at 09:53

## 2020-01-30 RX ADMIN — ENOXAPARIN SODIUM 40 MG: 40 INJECTION SUBCUTANEOUS at 09:55

## 2020-01-30 RX ADMIN — DOXYCYCLINE HYCLATE 100 MG: 100 TABLET, COATED ORAL at 21:23

## 2020-01-30 RX ADMIN — FLUTICASONE PROPIONATE 2 SPRAY: 50 SPRAY, METERED NASAL at 11:02

## 2020-01-30 RX ADMIN — ARFORMOTEROL TARTRATE 15 MCG: 15 SOLUTION RESPIRATORY (INHALATION) at 12:05

## 2020-01-30 RX ADMIN — IPRATROPIUM BROMIDE AND ALBUTEROL SULFATE 3 ML: .5; 3 SOLUTION RESPIRATORY (INHALATION) at 19:27

## 2020-01-30 RX ADMIN — IPRATROPIUM BROMIDE AND ALBUTEROL SULFATE 3 ML: .5; 3 SOLUTION RESPIRATORY (INHALATION) at 16:36

## 2020-01-30 RX ADMIN — BUDESONIDE 1000 MCG: 0.5 INHALANT RESPIRATORY (INHALATION) at 12:05

## 2020-01-30 RX ADMIN — METHYLPREDNISOLONE SODIUM SUCCINATE 40 MG: 40 INJECTION, POWDER, FOR SOLUTION INTRAMUSCULAR; INTRAVENOUS at 17:48

## 2020-01-30 RX ADMIN — ENOXAPARIN SODIUM 40 MG: 40 INJECTION SUBCUTANEOUS at 21:23

## 2020-01-30 RX ADMIN — PRAVASTATIN SODIUM 40 MG: 40 TABLET ORAL at 21:23

## 2020-01-30 RX ADMIN — METHYLPREDNISOLONE SODIUM SUCCINATE 40 MG: 40 INJECTION, POWDER, FOR SOLUTION INTRAMUSCULAR; INTRAVENOUS at 06:04

## 2020-01-30 RX ADMIN — INSULIN LISPRO 2 UNITS: 100 INJECTION, SOLUTION INTRAVENOUS; SUBCUTANEOUS at 17:48

## 2020-01-30 RX ADMIN — Medication 10 ML: at 21:24

## 2020-01-30 RX ADMIN — LORATADINE 10 MG: 10 TABLET ORAL at 09:54

## 2020-01-30 RX ADMIN — GLYBURIDE 5 MG: 5 TABLET ORAL at 09:55

## 2020-01-30 RX ADMIN — BUDESONIDE 1000 MCG: 0.5 INHALANT RESPIRATORY (INHALATION) at 19:27

## 2020-01-30 RX ADMIN — INSULIN GLARGINE 35 UNITS: 100 INJECTION, SOLUTION SUBCUTANEOUS at 09:54

## 2020-01-30 RX ADMIN — ASPIRIN 81 MG 81 MG: 81 TABLET ORAL at 09:54

## 2020-01-30 NOTE — CDMP QUERY
Dr Cherry Hazard: This patient is admitted with Sepsis. Please document in the progress notes the Clinical Indicators and any associated Acute Organ Dysfunction that supports this diagnosis or state that the diagnosis has been ruled out. 
 
=>  Sepsis POA (please provide additional sepsis criteria) 
=>  No Sepsis  
=>   Sepsis resolved  
=>  Other findings 
=> Unable to determine Current Documentation:  ED impression: Acute COPD exacerbation w/ acute bronchitis Sepsis  [facility] Approved definition) Documented Source of suspected or confirmed infection as manifested by 2 or more of the following, not easily explained by another co-existing condition:  Temperature:  >38C (100.9F)   -OR-  <36C  (96.8F): NO 
 Heart Rate:  > 90 bpm:  Yes 100s  Respiratory Rate:  > 20 / min  -OR-  PaCO2 < 32:  YES  RR 26 
 WBC:  > 12K  -OR- < 4K  -OR- Bands > 10:  Yes  WBC 15.2 Source:  Acute Bronchitis Explicitly link all related/associated Acute Organ Dysfunction to Sepsis:     
 Encephalopathy:  No  
 Sepsis-induced Hypotension (or)  Septic Shock:  NO   
        [SBP < 90 (or) MAP <65 (or) SBP drop > 40 points from baseline]  Hypoxemia (or)  Acute Respiratory Failure:  COPD 88% on RA     
        [need for invasive or non-invasive ventilation OR- pO2 < 60 mmHg]  Acute Kidney Injury (or) Acute Renal Failure OR- Oliguria         [serum Creatinine > 2.0 OR- Urine Output < 0.5ml/kg/hr for 2 hours]:  NO 
 Ileus (absent bowel sounds):  NO  
 Coagulopathy  DIC  Thrombocytopenia:           [Platelet Count < 177,209 OR- INR > 1.5 OR- aPTT >60 sec]  Hyperbilirubinemia     [Bilirubin > 2 mg/dL]:  0.05 
 Hyperlactatemia   [Lactic Acid > 2.0]   pending  Critical-Illness Myopathy or Polyneuropathy Severe Sepsis = Sepsis with associated Acute Organ Dysfunction Septic Shock = Refractory hypotension refractory to aggressive volume replacement and often requiring vasopressor therapy like dopamine  -OR-  Lactic Acidosis  [Lactic Acid > 4.0].

## 2020-01-30 NOTE — PROGRESS NOTES
Problem: Chronic Obstructive Pulmonary Disease (COPD)  Goal: *Oxygen saturation during activity within specified parameters  Outcome: Progressing Towards Goal  Goal: *Able to remain out of bed as prescribed  Outcome: Progressing Towards Goal  Goal: *Absence of hypoxia  Outcome: Progressing Towards Goal     Problem: Patient Education: Go to Patient Education Activity  Goal: Patient/Family Education  Outcome: Progressing Towards Goal     Problem: Falls - Risk of  Goal: *Absence of Falls  Description  Document Lola Lockett Fall Risk and appropriate interventions in the flowsheet. Outcome: Progressing Towards Goal  Note: Fall Risk Interventions:  Mobility Interventions: Patient to call before getting OOB, Bed/chair exit alarm, Utilize walker, cane, or other assistive device         Medication Interventions: Bed/chair exit alarm, Patient to call before getting OOB, Teach patient to arise slowly    Elimination Interventions: Call light in reach, Patient to call for help with toileting needs, Stay With Me (per policy), Toilet paper/wipes in reach              Problem: Patient Education: Go to Patient Education Activity  Goal: Patient/Family Education  Outcome: Progressing Towards Goal     Problem: Pressure Injury - Risk of  Goal: *Prevention of pressure injury  Description  Document Jeffrey Scale and appropriate interventions in the flowsheet.   Outcome: Progressing Towards Goal  Note: Pressure Injury Interventions:  Sensory Interventions: Assess changes in LOC, Avoid rigorous massage over bony prominences, Chair cushion, Check visual cues for pain, Keep linens dry and wrinkle-free         Activity Interventions: Increase time out of bed, Pressure redistribution bed/mattress(bed type), PT/OT evaluation    Mobility Interventions: HOB 30 degrees or less, Pressure redistribution bed/mattress (bed type), PT/OT evaluation    Nutrition Interventions: Document food/fluid/supplement intake    Friction and Shear Interventions: Apply protective barrier, creams and emollients, Feet elevated on foot rest, HOB 30 degrees or less, Lift team/patient mobility team, Minimize layers                Problem: Patient Education: Go to Patient Education Activity  Goal: Patient/Family Education  Outcome: Progressing Towards Goal

## 2020-01-30 NOTE — PROGRESS NOTES
0730  Report received from [facility]. SBAR, Kardex, ED Summary, Procedure Summary, Intake/Output, MAR, Accordion, Recent Results, Med Rec Status and Cardiac Rhythm ST were discussed. Lindsey Pratt      1100  Verbal orders during rounds to order PT/OT. Discussed yeast under patients breast and abdominal folds and lymphodema with Dr. Huong Francis, verbal orders for secura and Lac-Hydrin received. Discussed with Dr. Huong Francis that patient typically has a bipap at night when she has been admitted in the past.  Nurse to discuss with Respiratory to determine if previous settings can be found so order can be placed. Respiratory stated that the patients pulmonologist would need to be contacted to get appropriate settings. Patient has trilogy at home but states that she cannot tolerate it but can use ours without an issue. Patient states someone has come and adjusted there settings at home twice but she is still unable to tolerate. Nurse reviewed previous two admissions and Bipap settings were different on both occassions. Will need to have follow-up with pulmonologist for settings. Nurse washed patients legs with chlorhexidine wash and put Lac-Hydrin lotion on thick. About an hour later patient complained of having trouble breathing and stated that the Lac-Hydrin lotion has bleach in it and it makes her asthma worse. Neb treatment given. Patient should not have Lac-Hydrin lotion any longer. Nurse to follow up with wound care tomorrow to see if there is another option.

## 2020-01-30 NOTE — PROGRESS NOTES
Bedside shift change report GIVEN TO Scott Ordoñez RN. Report included the following information SBAR, Kardex, ED Summary, Intake/Output, MAR, Recent Results and Cardiac Rhythm NSR/Sinus Tach. SIGNIFICANT CHANGES DURING SHIFT:   PICC Team needs to obtain blood cultures and labs. No labs obtained during Night shift. Sepsis bundle ordered. Dual Skin completed and Wound Care consult ordered. CONCERNS TO ADDRESS WITH MD:  Order for BiPAP with ABGs. [facility] NURSING NOTE   Admission Date 1/29/2020   Admission Diagnosis COPD exacerbation ([address] [J44.1]   Consults None      Cardiac Monitoring [x] Yes [] No      Purposeful Hourly Rounding [x] Yes    Yu Score Total Score: 3   Yu score 3 or > [] Bed Alarm [] Avasys [] 1:1 sitter [] Patient refused (Signed refusal form in chart)   Jeffrey Score Jeffrey Score: 18   Jeffrey score 14 or < [] PMT consult [] Wound Care consult    []  Specialty bed  [] Nutrition consult      Influenza Vaccine Received Flu Vaccine for Current Season (usually Sept-March): Yes           Oxygen needs? [] Room air Oxygen @  []1L    []2L    []3L   [x]4L    []5L   []6L via  NC   Chronic home O2 use? [x] Yes [] No  Perform O2 challenge test and document in progress note using smartphrase (.Homeoxygen)      Last bowel movement        Urinary Catheter             LDAs               Peripheral IV 01/29/20 Right Antecubital (Active)   Site Assessment Clean, dry, & intact 1/30/2020  6:06 AM   Phlebitis Assessment 0 1/30/2020  6:06 AM   Infiltration Assessment 0 1/30/2020  6:06 AM   Dressing Status Clean, dry, & intact 1/30/2020  6:06 AM   Dressing Type Transparent;Tape 1/30/2020  6:06 AM   Hub Color/Line Status Pink;Flushed 1/30/2020  6:06 AM   Alcohol Cap Used No 1/29/2020 10:50 PM                         Readmission Risk Assessment Tool Score High Risk            28       Total Score        3 Has Seen PCP in Last 6 Months (Yes=3, No=0)    9 IP Visits Last 12 Months (1-3=4, 4=9, >4=11)    9 Pt. Coverage (Medicare=5 , Medicaid, or Self-Pay=4)    7 Charlson Comorbidity Score (Age + Comorbid Conditions)        Criteria that do not apply:    . Living with Significant Other. Assisted Living. LTAC. SNF.  or   Rehab    Patient Length of Stay (>5 days = 3)       Expected Length of Stay - - -   Actual Length of Stay 1

## 2020-01-30 NOTE — WOUND CARE
Wound Care Consult for the legs and for yeast dermatitis. Chart reviewed and patient assessed while sitting in the chair. Pt. Does not like to get into the bed and most of the time sleeps in a recliner. Pt. Well known to the wound care team from past care provided for the same skin issues. Assessment: The legs are covered with lichenified skin from chronic venous stasis / lymphedema. There are no open wounds that are visible at this time but patient has chronic drainage from the legs that cause this type of skin on the legs from edema / weeping. Lac-Hydrin lotion is the only solution at this time. Pt. Cannot tolerate compression wraps and she is non-compliant with some of her care / life-style changes that she has been coached about many times. The patient also has chronic yeast dermatitis that she treats under the breasts, which are clear at this time. The abdominal folds and pannus are wet with moisture but nursing is using wicking to keep the skin dry. Antifungal cream was ordered for her today for the yeast.  
Plan: nursing worked with physician to order the needed lotion and the antifungal lotion. Continue to treat the skin every day and monitor for any changes.   
Adam Bangura RN, BSN, [employer]

## 2020-01-30 NOTE — CDMP QUERY
Dr Aj Cross: Ольгаcindy Bertrand [facility] Jerzy Patient admitted with Sepsis 2nd to Acute COPD exacerbation w/ Acute bronchitis. . Noted documentation of Acute on Chronic Respiratory failure. Please provide additional documentation in the patient's medical record supportive of your documented diagnosis of Acute respiratory failure.   
 
=> Confirm Acute Respiratory Failure (provide criteria for clinical indicators) 
=> No Acute Respiratory Failure 
=> Chronic Respiratory Failure w/ hypoxia  
=> Other findings 
=> Clinically unable to determine The medical record reflects the following:   
   Risk Factors: hx of COPD, chronic hypoxic & hypercapneic respiratory failure on home O2 4L NC @ [facility] Clinical Indicators: p//w SOB x 1 day. Acute Respiratory Failure indicators include: - Respirations <12 or >25:  YES RR 24 -26 - Air hunger:  NO  
- Use of accessory muscles of respiration:  NO  
- Inability to speak in full sentences:  NO per ED \"no respiratory distress. Comments: Diffusely diminished breath sounds Able to speak in complete sentences - Cyanosis:  NO 
 AND 
- Pulse ox <90% RA or <95% on O2:   88% on 4L (COPD) - pH <7.35 or >7.45  
- pO2 < 60 mm Hg (or 10mm below COPD patient's baseline) - pCO2 >50mm Hg (or 10mm above COPD patient's baseline) Treatment: ED tx oral prednisone, duonebs, supplemental O2 maintain saturation at 90 % or greater. Ольга Bertrand .Thank you,  
[facility], [address] [place] [address], [address]  
[id number]

## 2020-01-30 NOTE — PROGRESS NOTES
Hospitalist Progress Note    NAME: Anastacio Almeida   :  1970   MRN:  [id number]       Assessment / Plan:  Sepsis by criteria/acute bronchitis  Indicators of sepsis were tachycardia, tachypnea and leukocytosis  Source of infection is likely acute bronchitis as patient reports thick greenish sputum production and shortness of breath  Chest x-ray did not show any evidence of pneumonia   lactic acid level 1.6   on doxycycline for acute bronchitis  0.10 is procalcitonin  FU sputum and blood cx, urinalysis neg for infection  Avoid giving fluids due to history of heart failure       Acute on chronic hypoxic and hypercapnic respiratory failure/COPD exacerbation  Likely secondary acute bronchitis  Solu-Medrol 40mg every 6 and duo nebs 4 times a day  Maintain oxygenation at 92%  Patient uses 4 L at home and is needing up to 6 L on admission     Chronic diastolic CHF  Continue Lasix 40 mg twice daily  I's and O's, Daily weight     Insulin-dependent type 2 diabetes:  Continue home dose of Lantus which is 35 units QHS per the pharmacy ntoe  Stop glyburide, use sliding scale insulin     Hypertension  Continue metoprolol     Coronary artery disease  Continue aspirin, statin, beta-blocker       Severely morbid obesity:  Due to excess caloric intake     L2 sclerotic lesion/history of breast cancer:  Patient has a PET scan ordered for this coming Monday  She has an outpatient follow-up with her oncologist        Code Status: Full code  Surrogate Decision Maker: Cecile [phone]), Briana Geronimo(son [phone])     DVT Prophylaxis: Lovenox      Subjective:     Chief Complaint / Reason for Physician Visit  Feeling a little better than yesterday when she came. Discussed with RN events overnight.      Review of Systems:  Symptom Y/N Comments  Symptom Y/N Comments   Fever/Chills    Chest Pain n    Poor Appetite    Edema     Cough y   Abdominal Pain n    Sputum y   Joint Pain     SOB/RAZO y   Pruritis/Rash Nausea/vomit n   Tolerating PT/OT     Diarrhea    Tolerating Diet     Constipation    Other y Back pain and legs     Could NOT obtain due to:      Objective:     VITALS:   Last 24hrs VS reviewed since prior progress note. Most recent are:  Patient Vitals for the past 24 hrs:   Temp Pulse Resp BP SpO2   01/30/20 1144 97.8 °F (36.6 °C) 86 22 137/74 90 %   01/30/20 0716 98 °F (36.7 °C) 96 22 125/67 92 %   01/30/20 0627     91 %   01/30/20 0612 97.9 °F (36.6 °C) (!) 107 22 149/88 (!) 85 %   01/30/20 0211     (!) 89 %   01/29/20 2326 97.8 °F (36.6 °C) 98 22 119/64 (!) 89 %   01/29/20 2250 97.8 °F (36.6 °C) (!) 104 22 (!) 133/97 (!) 88 %   01/29/20 1836 97.3 °F (36.3 °C) (!) 112  (!) 165/100 90 %   01/29/20 1800  (!) 101 23 (!) 168/94 (!) 87 %   01/29/20 1645  (!) 110 26 (!) 142/100 (!) 84 %   01/29/20 1630  (!) 113 21 102/58 (!) 88 %   01/29/20 1600  (!) 112 27 (!) 136/91 97 %   01/29/20 1500  (!) 105 18  99 %   01/29/20 1440     93 %       Intake/Output Summary (Last 24 hours) at 1/30/2020 1430  Last data filed at 1/30/2020 0900  Gross per 24 hour   Intake 120 ml   Output 850 ml   Net -730 ml        PHYSICAL EXAM:  General: WD, WN. Alert, cooperative, no acute distress  , obese  EENT:  EOMI. Anicteric sclerae. MMM  Resp:  CTA bilaterally, very soft BS.   No accessory muscle use  CV:  Regular  Rhythm by pulse,  ++ edema  GI:  Soft, Non distended, Non tender.  +Bowel sounds  Neurologic:  Alert and oriented X 3, normal speech,   Psych:   Good insight. Not anxious nor agitated  Skin:     No jaundice, LE with changes c/w chronic venous stasis    Reviewed most current lab test results and cultures  YES  Reviewed most current radiology test results   YES  Review and summation of old records today    NO  Reviewed patient's current orders and MAR    YES  PMH/ reviewed - no change compared to H&P  ________________________________________________________________________  Care Plan discussed with:    Comments Patient y    Family      RN y    Care Manager     Consultant                        Multidiciplinary team rounds were held today with , nursing, pharmacist and clinical coordinator. Patient's plan of care was discussed; medications were reviewed and discharge planning was addressed. ________________________________________________________________________  Total NON critical care TIME:      Minutes    Total CRITICAL CARE TIME Spent:   Minutes non procedure based      Comments   >50% of visit spent in counseling and coordination of care     ________________________________________________________________________  Kirill Hernandez MD     Procedures: see electronic medical records for all procedures/Xrays and details which were not copied into this note but were reviewed prior to creation of Plan. LABS:  I reviewed today's most current labs and imaging studies.   Pertinent labs include:  Recent Labs     01/30/20  1050 01/29/20  1423   WBC 12.4* 15.2*   HGB 13.6 14.6   HCT 45.1 49.3*   PLT 96* 152     Recent Labs     01/30/20  1050 01/29/20  1423    137   K 4.7 5.1   CL 99 100   CO2 34* 33*   * 209*   BUN 18 15   CREA 1.01 0.76   CA 10.6* 11.1*   ALB  --  3.6   TBILI  --  0.5   SGOT  --  27   ALT  --  24       Signed: Kirill Hernandez MD

## 2020-01-31 ENCOUNTER — PATIENT OUTREACH (OUTPATIENT)
Dept: CASE MANAGEMENT | Age: 50
End: 2020-01-31

## 2020-01-31 LAB
ANION GAP SERPL CALC-SCNC: 0 MMOL/L (ref 5–15)
BUN SERPL-MCNC: 20 MG/DL (ref 6–20)
BUN/CREAT SERPL: 21 (ref 12–20)
CALCIUM SERPL-MCNC: 10.3 MG/DL (ref 8.5–10.1)
CHLORIDE SERPL-SCNC: 99 MMOL/L (ref 97–108)
CO2 SERPL-SCNC: 35 MMOL/L (ref 21–32)
CREAT SERPL-MCNC: 0.97 MG/DL (ref 0.55–1.02)
ERYTHROCYTE [DISTWIDTH] IN BLOOD BY AUTOMATED COUNT: 13.2 % (ref 11.5–14.5)
GLUCOSE BLD STRIP.AUTO-MCNC: 234 MG/DL (ref 65–100)
GLUCOSE BLD STRIP.AUTO-MCNC: 244 MG/DL (ref 65–100)
GLUCOSE BLD STRIP.AUTO-MCNC: 268 MG/DL (ref 65–100)
GLUCOSE BLD STRIP.AUTO-MCNC: 284 MG/DL (ref 65–100)
GLUCOSE BLD STRIP.AUTO-MCNC: 291 MG/DL (ref 65–100)
GLUCOSE SERPL-MCNC: 246 MG/DL (ref 65–100)
HCT VFR BLD AUTO: 45.7 % (ref 35–47)
HGB BLD-MCNC: 13.6 G/DL (ref 11.5–16)
MCH RBC QN AUTO: 27.8 PG (ref 26–34)
MCHC RBC AUTO-ENTMCNC: 29.8 G/DL (ref 30–36.5)
MCV RBC AUTO: 93.3 FL (ref 80–99)
NRBC # BLD: 0 K/UL (ref 0–0.01)
NRBC BLD-RTO: 0 PER 100 WBC
PLATELET # BLD AUTO: 171 K/UL (ref 150–400)
PMV BLD AUTO: 11.5 FL (ref 8.9–12.9)
POTASSIUM SERPL-SCNC: 4.5 MMOL/L (ref 3.5–5.1)
RBC # BLD AUTO: 4.9 M/UL (ref 3.8–5.2)
SERVICE CMNT-IMP: ABNORMAL
SODIUM SERPL-SCNC: 134 MMOL/L (ref 136–145)
WBC # BLD AUTO: 12.6 K/UL (ref 3.6–11)

## 2020-01-31 PROCEDURE — 94640 AIRWAY INHALATION TREATMENT: CPT

## 2020-01-31 PROCEDURE — 97116 GAIT TRAINING THERAPY: CPT

## 2020-01-31 PROCEDURE — 77010033678 HC OXYGEN DAILY

## 2020-01-31 PROCEDURE — 77030027138 HC INCENT SPIROMETER -A

## 2020-01-31 PROCEDURE — 65660000000 HC RM CCU STEPDOWN

## 2020-01-31 PROCEDURE — 97110 THERAPEUTIC EXERCISES: CPT

## 2020-01-31 PROCEDURE — 74011000250 HC RX REV CODE- 250: Performed by: INTERNAL MEDICINE

## 2020-01-31 PROCEDURE — 74011000250 HC RX REV CODE- 250: Performed by: FAMILY MEDICINE

## 2020-01-31 PROCEDURE — 36415 COLL VENOUS BLD VENIPUNCTURE: CPT

## 2020-01-31 PROCEDURE — 97162 PT EVAL MOD COMPLEX 30 MIN: CPT

## 2020-01-31 PROCEDURE — 80048 BASIC METABOLIC PNL TOTAL CA: CPT

## 2020-01-31 PROCEDURE — 82962 GLUCOSE BLOOD TEST: CPT

## 2020-01-31 PROCEDURE — 74011250637 HC RX REV CODE- 250/637: Performed by: INTERNAL MEDICINE

## 2020-01-31 PROCEDURE — 74011250636 HC RX REV CODE- 250/636: Performed by: INTERNAL MEDICINE

## 2020-01-31 PROCEDURE — 85027 COMPLETE CBC AUTOMATED: CPT

## 2020-01-31 PROCEDURE — 94760 N-INVAS EAR/PLS OXIMETRY 1: CPT

## 2020-01-31 PROCEDURE — 74011636637 HC RX REV CODE- 636/637: Performed by: INTERNAL MEDICINE

## 2020-01-31 RX ORDER — ALBUTEROL SULFATE 0.83 MG/ML
2.5 SOLUTION RESPIRATORY (INHALATION)
Status: DISCONTINUED | OUTPATIENT
Start: 2020-01-31 | End: 2020-02-02 | Stop reason: HOSPADM

## 2020-01-31 RX ADMIN — LORATADINE 10 MG: 10 TABLET ORAL at 10:18

## 2020-01-31 RX ADMIN — ENOXAPARIN SODIUM 40 MG: 40 INJECTION SUBCUTANEOUS at 21:32

## 2020-01-31 RX ADMIN — ARFORMOTEROL TARTRATE 15 MCG: 15 SOLUTION RESPIRATORY (INHALATION) at 08:24

## 2020-01-31 RX ADMIN — INSULIN GLARGINE 35 UNITS: 100 INJECTION, SOLUTION SUBCUTANEOUS at 08:22

## 2020-01-31 RX ADMIN — IPRATROPIUM BROMIDE AND ALBUTEROL SULFATE 3 ML: .5; 3 SOLUTION RESPIRATORY (INHALATION) at 15:49

## 2020-01-31 RX ADMIN — ENOXAPARIN SODIUM 40 MG: 40 INJECTION SUBCUTANEOUS at 10:19

## 2020-01-31 RX ADMIN — INSULIN LISPRO 5 UNITS: 100 INJECTION, SOLUTION INTRAVENOUS; SUBCUTANEOUS at 08:22

## 2020-01-31 RX ADMIN — FUROSEMIDE 40 MG: 40 TABLET ORAL at 16:49

## 2020-01-31 RX ADMIN — BUDESONIDE 1000 MCG: 0.5 INHALANT RESPIRATORY (INHALATION) at 08:24

## 2020-01-31 RX ADMIN — ALBUTEROL SULFATE 2.5 MG: 2.5 SOLUTION RESPIRATORY (INHALATION) at 04:22

## 2020-01-31 RX ADMIN — Medication 10 ML: at 21:34

## 2020-01-31 RX ADMIN — BUDESONIDE 1000 MCG: 0.5 INHALANT RESPIRATORY (INHALATION) at 19:47

## 2020-01-31 RX ADMIN — CYCLOBENZAPRINE 5 MG: 10 TABLET, FILM COATED ORAL at 10:32

## 2020-01-31 RX ADMIN — ASPIRIN 81 MG 81 MG: 81 TABLET ORAL at 10:18

## 2020-01-31 RX ADMIN — METHYLPREDNISOLONE SODIUM SUCCINATE 40 MG: 40 INJECTION, POWDER, FOR SOLUTION INTRAMUSCULAR; INTRAVENOUS at 11:53

## 2020-01-31 RX ADMIN — INSULIN LISPRO 3 UNITS: 100 INJECTION, SOLUTION INTRAVENOUS; SUBCUTANEOUS at 16:49

## 2020-01-31 RX ADMIN — MICONAZOLE NITRATE: 20 CREAM TOPICAL at 10:24

## 2020-01-31 RX ADMIN — DOXYCYCLINE HYCLATE 100 MG: 100 TABLET, COATED ORAL at 21:32

## 2020-01-31 RX ADMIN — METHYLPREDNISOLONE SODIUM SUCCINATE 40 MG: 40 INJECTION, POWDER, FOR SOLUTION INTRAMUSCULAR; INTRAVENOUS at 18:46

## 2020-01-31 RX ADMIN — PRAVASTATIN SODIUM 40 MG: 40 TABLET ORAL at 21:33

## 2020-01-31 RX ADMIN — IPRATROPIUM BROMIDE AND ALBUTEROL SULFATE 3 ML: .5; 3 SOLUTION RESPIRATORY (INHALATION) at 11:30

## 2020-01-31 RX ADMIN — ACETAMINOPHEN 650 MG: 325 TABLET ORAL at 03:59

## 2020-01-31 RX ADMIN — MICONAZOLE NITRATE: 20 CREAM TOPICAL at 18:46

## 2020-01-31 RX ADMIN — FUROSEMIDE 40 MG: 40 TABLET ORAL at 08:22

## 2020-01-31 RX ADMIN — DOXYCYCLINE HYCLATE 100 MG: 100 TABLET, COATED ORAL at 10:18

## 2020-01-31 RX ADMIN — IPRATROPIUM BROMIDE AND ALBUTEROL SULFATE 3 ML: .5; 3 SOLUTION RESPIRATORY (INHALATION) at 19:47

## 2020-01-31 RX ADMIN — METOPROLOL TARTRATE 25 MG: 25 TABLET ORAL at 18:46

## 2020-01-31 RX ADMIN — Medication 10 ML: at 16:49

## 2020-01-31 RX ADMIN — METOPROLOL TARTRATE 25 MG: 25 TABLET ORAL at 10:18

## 2020-01-31 RX ADMIN — INSULIN LISPRO 5 UNITS: 100 INJECTION, SOLUTION INTRAVENOUS; SUBCUTANEOUS at 11:52

## 2020-01-31 RX ADMIN — PANTOPRAZOLE SODIUM 40 MG: 40 TABLET, DELAYED RELEASE ORAL at 08:22

## 2020-01-31 RX ADMIN — INSULIN LISPRO 3 UNITS: 100 INJECTION, SOLUTION INTRAVENOUS; SUBCUTANEOUS at 21:33

## 2020-01-31 RX ADMIN — METHYLPREDNISOLONE SODIUM SUCCINATE 40 MG: 40 INJECTION, POWDER, FOR SOLUTION INTRAMUSCULAR; INTRAVENOUS at 05:25

## 2020-01-31 RX ADMIN — Medication 10 ML: at 05:26

## 2020-01-31 NOTE — PROGRESS NOTES
Hospitalist Progress Note    NAME: Katherine Hernandez   :  1970   MRN:  [id number]       Assessment / Plan:  Sepsis by criteria/acute bronchitis  Indicators of sepsis were tachycardia, tachypnea and leukocytosis  Source of infection is likely acute bronchitis as patient reports thick greenish sputum production and shortness of breath  Chest x-ray did not show any evidence of pneumonia   lactic acid level 1.6   on doxycycline for acute bronchitis  0.10 is procalcitonin on admit  FU sputum and blood cx (NGTD), urinalysis neg for infection; MODERATE NORMAL RESPIRATORY AMRIK   Avoid giving fluids due to history of heart failure     Acute on chronic hypoxic and hypercapnic respiratory failure/COPD exacerbation  OHS, is suppose to use trilogy at home  Likely secondary acute bronchitis  Solu-Medrol 40mg every 6 and duo nebs 4 times a day  Maintain oxygenation at 92%  Patient uses 4 L at home and is needing up to 6 L on admission  Restart BIPAP tonight as she says her symptoms are worse today - I have looked up previous settings from another admission    Hypercalcemia  -decreasing, cont to monitor     Chronic diastolic CHF  Continue Lasix 40 mg twice daily  I's and O's, Daily weight -weight down from admit     Insulin-dependent type 2 diabetes:  Continue home dose of Lantus which is 35 units QHS per the pharmacy ntoe  Stop glyburide, use sliding scale insulin     Hypertension  Continue metoprolol     Coronary artery disease  Continue aspirin, statin, beta-blocker       Severely morbid obesity:  Due to excess caloric intake     L2 sclerotic lesion/history of breast cancer:  Patient has a PET scan ordered for this coming Monday  She has an outpatient follow-up with her oncologist        Code Status: Full code  Surrogate Decision Maker: Cecile Liu([phone]), Milton Bansal Juanpablo(son [phone])     DVT Prophylaxis: Lovenox      Subjective:     Chief Complaint / Reason for Physician Visit  Feeling worse today; no BIPAP overnight. Discussed with RN events overnight. Review of Systems:  Symptom Y/N Comments  Symptom Y/N Comments   Fever/Chills n   Chest Pain      Poor Appetite    Edema     Cough y   Abdominal Pain     Sputum y   Joint Pain     SOB/RAZO y   Pruritis/Rash     Nausea/vomit     Tolerating PT/OT     Diarrhea    Tolerating Diet     Constipation    Other y Back pain and legs     Could NOT obtain due to:      Objective:     VITALS:   Last 24hrs VS reviewed since prior progress note. Most recent are:  Patient Vitals for the past 24 hrs:   Temp Pulse Resp BP SpO2   01/31/20 1130     91 %   01/31/20 1053 98 °F (36.7 °C) 80 24 126/49 91 %   01/31/20 0825     90 %   01/31/20 0738 97.3 °F (36.3 °C) 79 22 126/60 90 %   01/31/20 0311 97.5 °F (36.4 °C) 83 20 118/61 93 %   01/30/20 2341 97.6 °F (36.4 °C) 89 20 116/66 92 %   01/30/20 2036    113/65 92 %   01/30/20 2027 97.8 °F (36.6 °C) 84 20 (!) 151/92 (!) 89 %   01/30/20 1927     93 %   01/30/20 1636     92 %   01/30/20 1525 98 °F (36.7 °C) 88 22 136/80 91 %     No intake or output data in the 24 hours ending 01/31/20 1322     PHYSICAL EXAM:  General: WD, WN. Alert, cooperative, no acute distress  , obese  EENT:  EOMI. Anicteric sclerae. MMM  Resp:  CTA bilaterally, very decreased BS.   No accessory muscle use  CV:  Regular  Rhythm by pulse,  ++ edema  GI:  Soft, Non distended, Non tender.  +Bowel sounds  Neurologic:  Alert and oriented X 3, normal speech,   Psych:   fair insight. Not anxious nor agitated  Skin:  No jaundice, LE with changes c/w chronic venous stasis including lichenified skin     Reviewed most current lab test results and cultures  YES  Reviewed most current radiology test results   YES  Review and summation of old records today    NO  Reviewed patient's current orders and MAR    YES  PMH/SH reviewed - no change compared to H&P  ________________________________________________________________________  Care Plan discussed with: Comments   Patient y    Family      RN y    Care Manager     Consultant                        Multidiciplinary team rounds were held today with , nursing, pharmacist and clinical coordinator. Patient's plan of care was discussed; medications were reviewed and discharge planning was addressed. ________________________________________________________________________  Total NON critical care TIME:      Minutes    Total CRITICAL CARE TIME Spent:   Minutes non procedure based      Comments   >50% of visit spent in counseling and coordination of care     ________________________________________________________________________  Bharat Schaeffer MD     Procedures: see electronic medical records for all procedures/Xrays and details which were not copied into this note but were reviewed prior to creation of Plan. LABS:  I reviewed today's most current labs and imaging studies.   Pertinent labs include:  Recent Labs     01/31/20  0334 01/30/20  1050 01/29/20  1423   WBC 12.6* 12.4* 15.2*   HGB 13.6 13.6 14.6   HCT 45.7 45.1 49.3*    96* 152     Recent Labs     01/31/20  0334 01/30/20  1050 01/29/20  1423   * 136 137   K 4.5 4.7 5.1   CL 99 99 100   CO2 35* 34* 33*   * 256* 209*   BUN 20 18 15   CREA 0.97 1.01 0.76   CA 10.3* 10.6* 11.1*   ALB  --   --  3.6   TBILI  --   --  0.5   SGOT  --   --  27   ALT  --   --  24       Signed: Bharat Schaeffer MD

## 2020-01-31 NOTE — PROGRESS NOTES
Transitional Care Team: Initial HUG Note    Date of Assessment: 01/31/20  Time of Assessment:  3:33 PM    Anastacio Almeida is a 54 y.o. female inpatient at  [facility]. Assessment & Plan   Pt A+O. Mild dyspnea at rest in bed with 4LNC in place. Morbid obesity and body habitus on her side potential contributing factors. Pt continues with occasional cough productive of greenish phlegm. Anticipates return home with her home health aides. Encouraged pt to utilize her trilogy machine at home to maintain optimum lung function          Primary Diagnosis: sepsis    Advance Directive:  . See ACP note from me     Current Code Status:  full    Referral to Hospice/ Palliative Care Appropriate: not yet . Awareness of Medical Conditions: (Trajectory of illness and pts expectations)discussed that as she ages her lung function is likely to continue to worsen. For now she hopes to get back to baseline good quality of life    Discharge Needs: (to include safety issues)resume aide services    Barriers Identified: bariatric equipment  Patient is willing to go to SNF/Inpatient Rehab if recommended: reluctant    Medication Review:  Await AVS.    Can patient afford medications:  Current meds. Patient is Compliant with Medication regimen:yes    Who manages medications at home: family    Best Patient Contact Number:    [phone]    HUG (Healthy Understanding of Goals) program introduced to patient/family. The Transitional Care Team bridges the gaps in care and education surrounding discharge from the acute care facility. The objective is to empower the patient and family in taking a proactive role in preventing readmission within the first thirty days after discharge. The team is also involved in the efforts to reduce readmission to the acute care setting after stabilization and discharge from the acute care environment either to skilled nursing facilities or community.      MYRAG RN/NP will return with CORA Calendar/ follow up appointments/ Ambulatory Nurse Navigator name and contact information when the patient is ready for discharge. Future Appointments   Date Time Provider Xochilt Foreman   2/7/2020 11:20 AM Kwame Wilder MD [facility]       Non-[facility] follow up appointment(s):none yet    [facility]: call information given to on discharge calendar      Patient education focused on readmission zones as described as: The Red Zone: High risk for readmission, days 1-21  The Yellow Zone: Moderate  risk for readmission, days 22-29   The Green Zone: Lower risk for readmission, days                30 and after    [facility] Team will attempt to follow the patient from a distance while inpatient as well as be available for further transition/disposition needs. The [facility] team will continue to offer support during the 30- 90 day discharge from acute care setting. Will notify Ambulatory {Blank Single Select Template:[id number][de-identified] \"LETICIA   RN.     Past Medical History:   Diagnosis Date    Arthritis     Asthma     Breast lump     CAD (coronary artery disease)     Cancer (HCC)     breast cancer    Congestive heart failure (HCC)     COPD (chronic obstructive pulmonary disease) (HCC)     Diabetes ([address]     Hypertension     Morbid obesity with BMI of 70 and over, adult ([address]     NSTEMI (non-ST elevated myocardial infarction) ([address]     SVT (supraventricular tachycardia) ([address]

## 2020-01-31 NOTE — PROGRESS NOTES
ADULT PROTOCOL: JET AEROSOL ASSESSMENT    Patient  Júnior Villalobos     54 y.o.   female     1/30/2020  9:01 PM    Breath Sounds Pre Procedure: Right Breath Sounds: Diminished                               Left Breath Sounds: Diminished    Breathing pattern: Pre procedure Breathing Pattern: Regular          Post procedure Breathing Pattern: Regular    Heart Rate: Pre procedure Pulse: 85           Post procedure Pulse: 86    Resp Rate: Pre procedure Respirations: 16           Post procedure Respirations: 16            Cough: Pre procedure Cough: Congested            Oxygen: O2 Device: Nasal cannula   Flow rate (L/min) 5     Changed: NO    SpO2: Pre procedure SpO2: 93 %   with oxygen              Post procedure SpO2: 94 %  with oxygen    Nebulizer Therapy: Current medications Aerosolized Medications: DuoNeb, Pulmicort      Changed: NO    Smoking History:    Smoking status: Former Smoker       Packs/day: 0.25       Types: Cigarettes         Problem List:   Patient Active Problem List   Diagnosis Code    Malignant essential hypertension I10    Asthma J45.909    Obesity hypoventilation syndrome ([facility]) E66.2    Dyspnea R06.00    Chest pressure R07.89    Acute on chronic diastolic heart failure ([facility]) I50.33    NSTEMI (non-ST elevated myocardial infarction) ([facility]) I21.4    S/P PTCA (percutaneous transluminal coronary angioplasty) Z98.61    Coronary atherosclerosis of native coronary artery I25.10    Hypoxia R09.02    Noncompliance with therapeutic plan Z91.11    Chest pain R07.9    GERD (gastroesophageal reflux disease) K21.9    Acute respiratory failure with hypoxia and hypercarbia ([facility]) J96.01, J96.02    COPD (chronic obstructive pulmonary disease) ([facility]) J44.9    Tobacco abuse Z72.0    BMI 60.0-69.9, adult ([facility]) Z68.44    Cellulitis L03.90    SIRS due to infectious process with acute organ dysfunction ([facility]) A41.9, R65.20    Sepsis associated hypotension ([facility]) A41.9, I95.9    Gangrenous toe ([facility] I96    Type II diabetes mellitus, uncontrolled ([facility]) E11.65    HTN (hypertension) Z00    Diastolic CHF, chronic ([facility]) I50.32    Cough with hemoptysis R04.2    Lymphedema of both lower extremities I89.0    CAP (community acquired pneumonia) J18.9    Cellulitis, leg L03.119    COPD exacerbation ([facility]) J44.1    Maggot infestation B87.9    Shortness of breath R06.02    Multifocal pneumonia J18.9    Acute respiratory failure ([facility]) J96.00    Acute on chronic respiratory failure with hypoxia and hypercapnia ([facility]) J96.21, J96.22    NSVT (nonsustained ventricular tachycardia) ([facility]) I47.2    Respiratory failure ([facility]) J96.90    Elevated lipase R74.8    Abdominal pain R10.9    Counseling regarding goals of care Z71.89    Acute pancreatitis K85.90    Breast cancer ([facility]) C50.919    Intertrigo L30.4    PNA (pneumonia) J18.9    Productive cough R05    Other fatigue R53.83    Acute chest pain R07.9    Acute on chronic respiratory failure with hypoxia ([facility]) J96.21    Acute on chronic respiratory failure with hypercapnia ([facility]) J96.22    Hypercapnic respiratory failure [facility]) J96.92       Respiratory Therapist: Cesar Flores RT

## 2020-01-31 NOTE — PROGRESS NOTES
Bedside shift change report GIVEN TO Juana Centeno RN. Report included the following information SBAR and Kardex. SIGNIFICANT CHANGES DURING SHIFT:        CONCERNS TO ADDRESS WITH MD:    Need Sleep settings for Bipap at night. [address] NURSING NOTE   Admission Date 1/29/2020   Admission Diagnosis COPD exacerbation ([address] [J44.1]   Consults None      Cardiac Monitoring [x] Yes [] No      Purposeful Hourly Rounding [x] Yes    Yu Score Total Score: 2   Yu score 3 or > [] Bed Alarm [] Avasys [] 1:1 sitter [] Patient refused (Signed refusal form in chart)   Jeffrey Score Jeffrey Score: 16   Jeffrey score 14 or < [] PMT consult [] Wound Care consult    []  Specialty bed  [] Nutrition consult      Influenza Vaccine Received Flu Vaccine for Current Season (usually Sept-March): Yes           Oxygen needs? [] Room air Oxygen @  []1L    []2L    []3L   []4L    [x]5L   []6L via  NC   Chronic home O2 use? [x] Yes [] No  Perform O2 challenge test and document in progress note using smartphrase (.Homeoxygen)      Last bowel movement Last Bowel Movement Date: 01/29/20      Urinary Catheter             LDAs               Peripheral IV 01/29/20 Right Antecubital (Active)   Site Assessment Clean, dry, & intact 1/31/2020  4:03 AM   Phlebitis Assessment 0 1/31/2020  4:03 AM   Infiltration Assessment 0 1/31/2020  4:03 AM   Dressing Status Clean, dry, & intact 1/31/2020  4:03 AM   Dressing Type Transparent;Tape 1/31/2020  4:03 AM   Hub Color/Line Status Pink;Flushed 1/31/2020  4:03 AM   Alcohol Cap Used No 1/29/2020 10:50 PM                         Readmission Risk Assessment Tool Score High Risk            28       Total Score        3 Has Seen PCP in Last 6 Months (Yes=3, No=0)    9 IP Visits Last 12 Months (1-3=4, 4=9, >4=11)    9 Pt. Coverage (Medicare=5 , Medicaid, or Self-Pay=4)    7 Charlson Comorbidity Score (Age + Comorbid Conditions)        Criteria that do not apply:    . Living with Significant Other. Assisted Living. LTAC. SNF.  or   Rehab    Patient Length of Stay (>5 days = 3)       Expected Length of Stay 4d 19h   Actual Length of Stay 2

## 2020-01-31 NOTE — PROGRESS NOTES
Problem: Mobility Impaired (Adult and Pediatric)  Goal: *Acute Goals and Plan of Care (Insert Text)  Description  FUNCTIONAL STATUS PRIOR TO ADMISSION: patient was independent with ambulation for very short distances in the house. She reports she stays in one room and ambulates only that distance. She uses a wheelchair/scooter for community distances. She is on 4L O2 NC at baseline. Denies any falls in the last year. HOME SUPPORT PRIOR TO ADMISSION: The patient lived alone with caregiver for 6 hours daily (7 days a week) to provide assistance. Her son and family also are close by. Physical Therapy Goals  Initiated 1/31/2020  1. Patient will move from supine to sit and sit to supine , scoot up and down and roll side to side in bed with modified independence within 7 day(s). 2.  Patient will transfer from bed to chair and chair to bed with independence using the least restrictive device within 7 day(s). 3.  Patient will perform sit to stand with independence within 7 day(s). 4.  Patient will ambulate with independence for 50 feet with the least restrictive device within 7 day(s). 5.  Patient will ascend/descend 5 stairs with left sided handrail(s) with minimal assistance/contact guard assist within 7 day(s). Outcome: Not Met   PHYSICAL THERAPY EVALUATION  Patient: Rosamaria Kingston (25 y.o. female)  Date: 1/31/2020  Primary Diagnosis: COPD exacerbation ([facility] [J44.1]        Precautions:   Contact      ASSESSMENT  Based on the objective data described below, the patient presents with decreased strength, decreased endurance, mildly unsteady gait, and decreased functional mobility following admission for COPD exacerbation. Patient is likely functioning at her baseline although reports decreased endurance and SOB. O2 sats dropped to 79% on 4L O2 requiring 5L O2 and >10 minutes to recover to 90% with constant verbal cues and IS use.  Patient with history of non-compliance in past.     Current Level of Function Impacting Discharge (mobility/balance): SBA-supervision for mobility    Functional Outcome Measure: The patient scored 65/100 on the Barthel outcome measure which is indicative of moderate functional impairment. Other factors to consider for discharge: lives alone with caregiver, L2 lesion?, O2 sats dropped     Patient will benefit from skilled therapy intervention to address the above noted impairments. PLAN :  Recommendations and Planned Interventions: bed mobility training, transfer training, gait training, therapeutic exercises, patient and family training/education, and therapeutic activities      Frequency/Duration: Patient will be followed by physical therapy:  3 times a week to address goals. Recommendation for discharge: (in order for the patient to meet his/her long term goals)  No skilled physical therapy/ follow up rehabilitation needs identified at this time. --with family/caregiver support    This discharge recommendation:  Has not yet been discussed the attending provider and/or case management    IF patient discharges home will need the following DME: patient owns DME required for discharge         SUBJECTIVE:   Patient stated I have had therapy in the house before but they wore perfume so I couldn't take it.     OBJECTIVE DATA SUMMARY:   HISTORY:    Past Medical History:   Diagnosis Date    Arthritis     Asthma     Breast lump     CAD (coronary artery disease)     Cancer ([facility]     breast cancer    Congestive heart failure (HCC)     COPD (chronic obstructive pulmonary disease) (HCC)     Diabetes ([facility]     Hypertension     Morbid obesity with BMI of 70 and over, adult [facility])     NSTEMI (non-ST elevated myocardial infarction) ([facility]     SVT (supraventricular tachycardia) ([facility]      Past Surgical History:   Procedure Laterality Date    CARDIAC SURG PROCEDURE UNLIST      Stents    HX  SECTION      HX ORTHOPAEDIC      HX OTHER SURGICAL      cyst removed from back Personal factors and/or comorbidities impacting plan of care: lives alone, caregiver support, SOB    Home Situation  Home Environment: Private residence  # Steps to Enter: 5  Rails to Enter: Yes  Hand Rails : Left  Wheelchair Ramp: No  One/Two Story Residence: One story  Living Alone: Yes  Support Systems: Home care staff, Child(kole)(aide for 6 hours for 7 days a week, son close by)  Patient Expects to be Discharged to[de-identified] Unknown  Current DME Used/Available at Home: Commode, bedside, Oxygen, portable, Wheelchair, [place] [employer], rollator, [address] [facility] chair, Hospital bed(scooter)  Tub or Shower Type: Tub/Shower combination(sponge bathes)    EXAMINATION/PRESENTATION/DECISION MAKING:   Critical Behavior:              Hearing: Auditory  Auditory Impairment: None  Skin:    Edema:   Range Of Motion:  AROM: Generally decreased, functional           PROM: Within functional limits           Strength:    Strength: Generally decreased, functional                    Tone & Sensation:   Tone: Normal              Sensation: Intact               Coordination:  Coordination: Within functional limits  Vision:      Functional Mobility:  Bed Mobility:  Rolling: (sitting in chair )           Transfers:  Sit to Stand: Independent  Stand to Sit: Independent        Bed to Chair: Independent              Balance:   Sitting: Intact; Without support  Standing: Impaired; Without support  Standing - Static: Good  Standing - Dynamic : Good  Ambulation/Gait Training:  Distance (ft): 30 Feet (ft)  Assistive Device: Gait belt  Ambulation - Level of Assistance: Supervision     Gait Description (WDL): Exceptions to WDL  Gait Abnormalities: Trunk sway increased        Base of Support: Widened     Speed/Danay: Pace decreased (<100 feet/min)  Step Length: Right shortened;Left shortened            Therapeutic Exercises:   Required >10 minutes to recover to 90% on 4 L O2, with constant verbal cues for PLB and educated on IS use.  Retrieved humidification to improve dryness. Functional Measure:  Barthel Index:    Bathin  Bladder: 10  Bowels: 10  Groomin  Dressin  Feeding: 10  Mobility: 0  Stairs: 5  Toilet Use: 5  Transfer (Bed to Chair and Back): 15  Total: 65/100       The Barthel ADL Index: Guidelines  1. The index should be used as a record of what a patient does, not as a record of what a patient could do. 2. The main aim is to establish degree of independence from any help, physical or verbal, however minor and for whatever reason. 3. The need for supervision renders the patient not independent. 4. A patient's performance should be established using the best available evidence. Asking the patient, friends/relatives and nurses are the usual sources, but direct observation and common sense are also important. However direct testing is not needed. 5. Usually the patient's performance over the preceding 24-48 hours is important, but occasionally longer periods will be relevant. 6. Middle categories imply that the patient supplies over 50 per cent of the effort. 7. Use of aids to be independent is allowed. Karen Fuller., Barthel, D.W. (0138). Functional evaluation: the Barthel Index. [address] [facility] (14)2. PEACE Brown, Alessandro Kessler., Eric Osorio., [place], [address] [place] [address] (). Measuring the change indisability after inpatient rehabilitation; comparison of the responsiveness of the Barthel Index and Functional [place] Measure. Journal of Neurology, Neurosurgery, and Psychiatry, 66(4), 544-299. Zahra Mueller, [place].ERIK, AGUSTO Zarco, & Leslie Bhakta M.A. (2004.) Assessment of post-stroke quality of life in cost-effectiveness studies: The usefulness of the Barthel Index and the EuroQoL-5D.  Quality of Life Research, 15, 235-26            Physical Therapy Evaluation Charge Determination   History Examination Presentation Decision-Making   MEDIUM  Complexity : 1-2 comorbidities / personal factors will impact the outcome/ POC  MEDIUM Complexity : 3 Standardized tests and measures addressing body structure, function, activity limitation and / or participation in recreation  MEDIUM Complexity : Evolving with changing characteristics  Other outcome measures Barthel   MEDIUM      Based on the above components, the patient evaluation is determined to be of the following complexity level: MEDIUM      Activity Tolerance:   Fair, Poor, desaturates with exertion and requires oxygen, and requires rest breaks  Please refer to the flowsheet for vital signs taken during this treatment. After treatment patient left in no apparent distress:   Sitting in chair and Call bell within reach    COMMUNICATION/EDUCATION:   The patients plan of care was discussed with: Occupational Therapist, Registered Nurse, and . Fall prevention education was provided and the patient/caregiver indicated understanding., Patient/family have participated as able in goal setting and plan of care. , and Patient/family agree to work toward stated goals and plan of care.     Thank you for this referral.  Vin Blount, PT, DPT   Time Calculation: 43 mins

## 2020-01-31 NOTE — PROGRESS NOTES
OT referral received, chart reviewed, and patient screened for OT needs. Spoke with patient who reports being at her baseline of Total A for LB ADLs and toileting, and being at an independent to supervision/setup level for UB ADLs, grooming and self feeding. Per evaluating PT the patient was supervision for transfer to [facility], which is also the patient's baseline. No OT needs indicated at this time. Will complete order.

## 2020-01-31 NOTE — PROGRESS NOTES
Problem: Chronic Obstructive Pulmonary Disease (COPD)  Goal: *Oxygen saturation during activity within specified parameters  Outcome: Progressing Towards Goal  Goal: *Able to remain out of bed as prescribed  Outcome: Progressing Towards Goal  Goal: *Absence of hypoxia  Outcome: Progressing Towards Goal     Problem: Patient Education: Go to Patient Education Activity  Goal: Patient/Family Education  Outcome: Progressing Towards Goal     Problem: Falls - Risk of  Goal: *Absence of Falls  Description  Document Ankit Paula Fall Risk and appropriate interventions in the flowsheet.   Outcome: Progressing Towards Goal  Note: Fall Risk Interventions:  Mobility Interventions: Bed/chair exit alarm, Patient to call before getting OOB, OT consult for ADLs         Medication Interventions: Bed/chair exit alarm, Evaluate medications/consider consulting pharmacy, Patient to call before getting OOB, Teach patient to arise slowly    Elimination Interventions: Bed/chair exit alarm, Call light in reach, Patient to call for help with toileting needs              Problem: Patient Education: Go to Patient Education Activity  Goal: Patient/Family Education  Outcome: Progressing Towards Goal

## 2020-01-31 NOTE — ADVANCED PRACTICE NURSE
Bedside and Verbal shift change report GIVEN TO Vinny Rodriguez RN. Report included the following information SBAR and Kardex. SIGNIFICANT CHANGES DURING SHIFT:    Hanane [facility] sleep disorder center to request patient's CPAP setting for tonight. Left phone number with  and they will call back to relay information. 1627- Called PET scan for Leonardo Feliz and they stated that current scanner is unable to scan patient d/t current weight (401.18 lb). HS CPAP orders already found in Kardex. [facility] NURSING NOTE   Admission Date 1/29/2020   Admission Diagnosis COPD exacerbation ([address] [J44.1]   Consults None      Cardiac Monitoring [x] Yes [] No      Purposeful Hourly Rounding [x] Yes    Yu Score Total Score: 2   Yu score 3 or > [] Bed Alarm [] Avasys [] 1:1 sitter [] Patient refused (Signed refusal form in chart)   Jeffrey Score Jeffrey Score: 17   Jeffrey score 14 or < [] PMT consult [] Wound Care consult    []  Specialty bed  [] Nutrition consult      Influenza Vaccine Received Flu Vaccine for Current Season (usually Sept-March): Yes           Oxygen needs? [] Room air Oxygen @  []1L    []2L    []3L   []4L    [x]5L   []6L via  NC   Chronic home O2 use?  [x] Yes [] No  Perform O2 challenge test and document in progress note using smartphrase (.Homeoxygen)      Last bowel movement Last Bowel Movement Date: 01/29/20      Urinary Catheter             LDAs               Peripheral IV 01/29/20 Right Antecubital (Active)   Site Assessment Clean, dry, & intact 1/31/2020  3:49 PM   Phlebitis Assessment 0 1/31/2020  3:49 PM   Infiltration Assessment 0 1/31/2020  3:49 PM   Dressing Status Clean, dry, & intact 1/31/2020  3:49 PM   Dressing Type Transparent 1/31/2020  3:49 PM   Hub Color/Line Status Pink;Flushed 1/31/2020  3:49 PM   Alcohol Cap Used No 1/29/2020 10:50 PM                         Readmission Risk Assessment Tool Score High Risk            28       Total Score        3 Has Seen PCP in Last 6 Months (Yes=3, No=0)    9 IP Visits Last 12 Months (1-3=4, 4=9, >4=11)    9 Pt. Coverage (Medicare=5 , Medicaid, or Self-Pay=4)    7 Charlson Comorbidity Score (Age + Comorbid Conditions)        Criteria that do not apply:    . Living with Significant Other. Assisted Living. LTAC. SNF.  or   Rehab    Patient Length of Stay (>5 days = 3)       Expected Length of Stay 4d 19h   Actual Length of Stay 2

## 2020-01-31 NOTE — PROGRESS NOTES
Problem: Falls - Risk of  Goal: *Absence of Falls  Description  Document Marichuy Mckenzie Fall Risk and appropriate interventions in the flowsheet.   Outcome: Progressing Towards Goal  Note: Fall Risk Interventions:  Mobility Interventions: Assess mobility with egress test, Bed/chair exit alarm, Communicate number of staff needed for ambulation/transfer, OT consult for ADLs, Patient to call before getting OOB, PT Consult for assist device competence, PT Consult for mobility concerns, Utilize walker, cane, or other assistive device, Strengthening exercises (ROM-active/passive)         Medication Interventions: Bed/chair exit alarm, Patient to call before getting OOB, Teach patient to arise slowly    Elimination Interventions: Call light in reach, Patient to call for help with toileting needs, Toileting schedule/hourly rounds

## 2020-02-01 LAB
ANION GAP SERPL CALC-SCNC: 2 MMOL/L (ref 5–15)
BACTERIA SPEC CULT: NORMAL
BUN SERPL-MCNC: 21 MG/DL (ref 6–20)
BUN/CREAT SERPL: 22 (ref 12–20)
CALCIUM SERPL-MCNC: 9.9 MG/DL (ref 8.5–10.1)
CHLORIDE SERPL-SCNC: 96 MMOL/L (ref 97–108)
CO2 SERPL-SCNC: 37 MMOL/L (ref 21–32)
CREAT SERPL-MCNC: 0.96 MG/DL (ref 0.55–1.02)
ERYTHROCYTE [DISTWIDTH] IN BLOOD BY AUTOMATED COUNT: 13.2 % (ref 11.5–14.5)
GLUCOSE BLD STRIP.AUTO-MCNC: 232 MG/DL (ref 65–100)
GLUCOSE BLD STRIP.AUTO-MCNC: 245 MG/DL (ref 65–100)
GLUCOSE BLD STRIP.AUTO-MCNC: 264 MG/DL (ref 65–100)
GLUCOSE BLD STRIP.AUTO-MCNC: 301 MG/DL (ref 65–100)
GLUCOSE SERPL-MCNC: 289 MG/DL (ref 65–100)
GRAM STN SPEC: NORMAL
HCT VFR BLD AUTO: 46.6 % (ref 35–47)
HGB BLD-MCNC: 13.8 G/DL (ref 11.5–16)
MCH RBC QN AUTO: 27.7 PG (ref 26–34)
MCHC RBC AUTO-ENTMCNC: 29.6 G/DL (ref 30–36.5)
MCV RBC AUTO: 93.4 FL (ref 80–99)
NRBC # BLD: 0 K/UL (ref 0–0.01)
NRBC BLD-RTO: 0 PER 100 WBC
PLATELET # BLD AUTO: 192 K/UL (ref 150–400)
PMV BLD AUTO: 11.1 FL (ref 8.9–12.9)
POTASSIUM SERPL-SCNC: 5.4 MMOL/L (ref 3.5–5.1)
RBC # BLD AUTO: 4.99 M/UL (ref 3.8–5.2)
SERVICE CMNT-IMP: ABNORMAL
SERVICE CMNT-IMP: NORMAL
SODIUM SERPL-SCNC: 135 MMOL/L (ref 136–145)
WBC # BLD AUTO: 11.5 K/UL (ref 3.6–11)

## 2020-02-01 PROCEDURE — 94760 N-INVAS EAR/PLS OXIMETRY 1: CPT

## 2020-02-01 PROCEDURE — 82962 GLUCOSE BLOOD TEST: CPT

## 2020-02-01 PROCEDURE — 74011250636 HC RX REV CODE- 250/636: Performed by: INTERNAL MEDICINE

## 2020-02-01 PROCEDURE — 65660000000 HC RM CCU STEPDOWN

## 2020-02-01 PROCEDURE — 74011250637 HC RX REV CODE- 250/637: Performed by: INTERNAL MEDICINE

## 2020-02-01 PROCEDURE — 77010033678 HC OXYGEN DAILY

## 2020-02-01 PROCEDURE — 74011000250 HC RX REV CODE- 250: Performed by: INTERNAL MEDICINE

## 2020-02-01 PROCEDURE — 80048 BASIC METABOLIC PNL TOTAL CA: CPT

## 2020-02-01 PROCEDURE — 85027 COMPLETE CBC AUTOMATED: CPT

## 2020-02-01 PROCEDURE — 94640 AIRWAY INHALATION TREATMENT: CPT

## 2020-02-01 PROCEDURE — 36415 COLL VENOUS BLD VENIPUNCTURE: CPT

## 2020-02-01 PROCEDURE — 74011636637 HC RX REV CODE- 636/637: Performed by: INTERNAL MEDICINE

## 2020-02-01 RX ORDER — INSULIN GLARGINE 100 [IU]/ML
40 INJECTION, SOLUTION SUBCUTANEOUS DAILY
Status: DISCONTINUED | OUTPATIENT
Start: 2020-02-02 | End: 2020-02-02 | Stop reason: HOSPADM

## 2020-02-01 RX ORDER — GUAIFENESIN 600 MG/1
600 TABLET, EXTENDED RELEASE ORAL EVERY 12 HOURS
Status: DISCONTINUED | OUTPATIENT
Start: 2020-02-01 | End: 2020-02-02 | Stop reason: HOSPADM

## 2020-02-01 RX ORDER — SODIUM POLYSTYRENE SULFONATE 15 G/60ML
15 SUSPENSION ORAL; RECTAL
Status: COMPLETED | OUTPATIENT
Start: 2020-02-01 | End: 2020-02-01

## 2020-02-01 RX ORDER — SODIUM POLYSTYRENE SULFONATE 4.1 MEQ/G
15 POWDER, FOR SUSPENSION ORAL; RECTAL
Status: DISCONTINUED | OUTPATIENT
Start: 2020-02-01 | End: 2020-02-01 | Stop reason: CLARIF

## 2020-02-01 RX ADMIN — INSULIN LISPRO 3 UNITS: 100 INJECTION, SOLUTION INTRAVENOUS; SUBCUTANEOUS at 17:19

## 2020-02-01 RX ADMIN — PANTOPRAZOLE SODIUM 40 MG: 40 TABLET, DELAYED RELEASE ORAL at 08:10

## 2020-02-01 RX ADMIN — ARFORMOTEROL TARTRATE 15 MCG: 15 SOLUTION RESPIRATORY (INHALATION) at 20:37

## 2020-02-01 RX ADMIN — ASPIRIN 81 MG 81 MG: 81 TABLET ORAL at 10:25

## 2020-02-01 RX ADMIN — GUAIFENESIN 600 MG: 600 TABLET, EXTENDED RELEASE ORAL at 21:32

## 2020-02-01 RX ADMIN — Medication 10 ML: at 14:41

## 2020-02-01 RX ADMIN — BUDESONIDE 1000 MCG: 0.5 INHALANT RESPIRATORY (INHALATION) at 20:37

## 2020-02-01 RX ADMIN — GUAIFENESIN 600 MG: 600 TABLET, EXTENDED RELEASE ORAL at 12:33

## 2020-02-01 RX ADMIN — METHYLPREDNISOLONE SODIUM SUCCINATE 40 MG: 40 INJECTION, POWDER, FOR SOLUTION INTRAMUSCULAR; INTRAVENOUS at 12:21

## 2020-02-01 RX ADMIN — ENOXAPARIN SODIUM 40 MG: 40 INJECTION SUBCUTANEOUS at 10:26

## 2020-02-01 RX ADMIN — METHYLPREDNISOLONE SODIUM SUCCINATE 40 MG: 40 INJECTION, POWDER, FOR SOLUTION INTRAMUSCULAR; INTRAVENOUS at 00:10

## 2020-02-01 RX ADMIN — METOPROLOL TARTRATE 25 MG: 25 TABLET ORAL at 17:19

## 2020-02-01 RX ADMIN — DOXYCYCLINE HYCLATE 100 MG: 100 TABLET, COATED ORAL at 10:25

## 2020-02-01 RX ADMIN — FUROSEMIDE 40 MG: 40 TABLET ORAL at 17:19

## 2020-02-01 RX ADMIN — DOXYCYCLINE HYCLATE 100 MG: 100 TABLET, COATED ORAL at 21:32

## 2020-02-01 RX ADMIN — FUROSEMIDE 40 MG: 40 TABLET ORAL at 08:11

## 2020-02-01 RX ADMIN — SODIUM POLYSTYRENE SULFONATE 15 G: 15 SUSPENSION ORAL; RECTAL at 14:41

## 2020-02-01 RX ADMIN — INSULIN GLARGINE 35 UNITS: 100 INJECTION, SOLUTION SUBCUTANEOUS at 10:26

## 2020-02-01 RX ADMIN — Medication 10 ML: at 21:32

## 2020-02-01 RX ADMIN — BUDESONIDE 1000 MCG: 0.5 INHALANT RESPIRATORY (INHALATION) at 08:05

## 2020-02-01 RX ADMIN — METHYLPREDNISOLONE SODIUM SUCCINATE 40 MG: 40 INJECTION, POWDER, FOR SOLUTION INTRAMUSCULAR; INTRAVENOUS at 05:39

## 2020-02-01 RX ADMIN — MICONAZOLE NITRATE: 20 CREAM TOPICAL at 17:20

## 2020-02-01 RX ADMIN — LORATADINE 10 MG: 10 TABLET ORAL at 10:25

## 2020-02-01 RX ADMIN — INSULIN LISPRO 3 UNITS: 100 INJECTION, SOLUTION INTRAVENOUS; SUBCUTANEOUS at 21:59

## 2020-02-01 RX ADMIN — MICONAZOLE NITRATE: 20 CREAM TOPICAL at 00:31

## 2020-02-01 RX ADMIN — ENOXAPARIN SODIUM 40 MG: 40 INJECTION SUBCUTANEOUS at 21:32

## 2020-02-01 RX ADMIN — METHYLPREDNISOLONE SODIUM SUCCINATE 40 MG: 40 INJECTION, POWDER, FOR SOLUTION INTRAMUSCULAR; INTRAVENOUS at 23:43

## 2020-02-01 RX ADMIN — ARFORMOTEROL TARTRATE 15 MCG: 15 SOLUTION RESPIRATORY (INHALATION) at 08:05

## 2020-02-01 RX ADMIN — Medication 10 ML: at 05:39

## 2020-02-01 RX ADMIN — IPRATROPIUM BROMIDE AND ALBUTEROL SULFATE 3 ML: .5; 3 SOLUTION RESPIRATORY (INHALATION) at 15:38

## 2020-02-01 RX ADMIN — INSULIN LISPRO 7 UNITS: 100 INJECTION, SOLUTION INTRAVENOUS; SUBCUTANEOUS at 12:21

## 2020-02-01 RX ADMIN — METOPROLOL TARTRATE 25 MG: 25 TABLET ORAL at 10:25

## 2020-02-01 RX ADMIN — PRAVASTATIN SODIUM 40 MG: 40 TABLET ORAL at 21:32

## 2020-02-01 RX ADMIN — INSULIN LISPRO 3 UNITS: 100 INJECTION, SOLUTION INTRAVENOUS; SUBCUTANEOUS at 08:10

## 2020-02-01 RX ADMIN — IPRATROPIUM BROMIDE AND ALBUTEROL SULFATE 3 ML: .5; 3 SOLUTION RESPIRATORY (INHALATION) at 11:22

## 2020-02-01 NOTE — PROGRESS NOTES
0700- Per Amna Lindsay, unable to obtain am labs because patient is a hard stick. 8996- Per patient they did not wear CPAP last night because the extra connection was not set up for the patient to reach the [facility]. Per patient, \"I'm taking fluid pills I have to be able to reach the commode. \" RT, April in the room and added extension. Patient expecting to use CPAP tonight. 0830- Called PICC team to draw labs, Josue Ruiz said they will come see the patient soon.

## 2020-02-01 NOTE — PROGRESS NOTES
Bedside and Verbal shift change report GIVEN TO Montana Goss RN. Report included the following information SBAR, Kardex, ED Summary, Procedure Summary, Intake/Output and MAR. [facility] NURSING NOTE   Admission Date 1/29/2020   Admission Diagnosis COPD exacerbation ([address] [J44.1]   Consults None      Cardiac Monitoring [x] Yes [] No      Purposeful Hourly Rounding [x] Yes    Yu Score Total Score: 2   Yu score 3 or > [x] Bed Alarm [] Avasys [] 1:1 sitter [] Patient refused (Signed refusal form in chart)   Jeffrey Score Jeffrey Score: 17   Jeffrey score 14 or < [] PMT consult [x] Wound Care consult    []  Specialty bed  [] Nutrition consult      Influenza Vaccine Received Flu Vaccine for Current Season (usually Sept-March): Yes           Oxygen needs? [] Room air Oxygen @  []1L    []2L    []3L   []4L    [x]5L   []6L via  NC   Chronic home O2 use? [x] Yes [] No  Perform O2 challenge test and document in progress note using smartphrase (.Homeoxygen)      Last bowel movement Last Bowel Movement Date: 01/29/20      Urinary Catheter             LDAs               Peripheral IV 01/29/20 Right Antecubital (Active)   Site Assessment Clean, dry, & intact 2/1/2020  3:57 PM   Phlebitis Assessment 0 2/1/2020  3:57 PM   Infiltration Assessment 0 2/1/2020  3:57 PM   Dressing Status Clean, dry, & intact 2/1/2020  3:57 PM   Dressing Type Transparent 2/1/2020  3:57 PM   Hub Color/Line Status Pink;Flushed 2/1/2020  3:57 PM   Alcohol Cap Used No 1/29/2020 10:50 PM                         Readmission Risk Assessment Tool Score High Risk            28       Total Score        3 Has Seen PCP in Last 6 Months (Yes=3, No=0)    9 IP Visits Last 12 Months (1-3=4, 4=9, >4=11)    9 Pt. Coverage (Medicare=5 , Medicaid, or Self-Pay=4)    7 Charlson Comorbidity Score (Age + Comorbid Conditions)        Criteria that do not apply:    . Living with Significant Other. Assisted Living. LTAC. SNF.  or   Rehab    Patient Length of Stay (>5 days = 3)       Expected Length of Stay 4d 19h   Actual Length of Stay 3

## 2020-02-01 NOTE — PROGRESS NOTES
Bedside and Verbal shift change report GIVEN TO , RN. Report included the following information Kardex. SIGNIFICANT CHANGES DURING SHIFT:      0600 I attempted to draw am labs ,and was unsuccessful ,Pt requesting to have PICC team draw I will notify oncoming RN     CONCERNS TO ADDRESS WITH MD:            [address] NURSING NOTE   Admission Date 1/29/2020   Admission Diagnosis COPD exacerbation ([address] [J44.1]   Consults None      Cardiac Monitoring [x] Yes [] No      Purposeful Hourly Rounding [x] Yes    Yu Score Total Score: 2   Yu score 3 or > [x] Bed Alarm [] Avasys [] 1:1 sitter [] Patient refused (Signed refusal form in chart)   Jeffrey Score Jeffrey Score: 17   Jeffrey score 14 or < [] PMT consult [] Wound Care consult    []  Specialty bed  [] Nutrition consult      Influenza Vaccine Received Flu Vaccine for Current Season (usually Sept-March): Yes           Oxygen needs? [] Room air Oxygen @  []1L    []2L    []3L   [x]4L    []5L   []6L via  NC   Chronic home O2 use? [x] Yes [] No  Perform O2 challenge test and document in progress note using smartphrase (.Homeoxygen)      Last bowel movement Last Bowel Movement Date: 01/29/20      Urinary Catheter             LDAs               Peripheral IV 01/29/20 Right Antecubital (Active)   Site Assessment Clean, dry, & intact 1/31/2020  7:49 PM   Phlebitis Assessment 0 1/31/2020  7:49 PM   Infiltration Assessment 0 1/31/2020  7:49 PM   Dressing Status Clean, dry, & intact 1/31/2020  7:49 PM   Dressing Type Tape;Transparent 1/31/2020  7:49 PM   Hub Color/Line Status Pink;Capped;Flushed;Patent 1/31/2020  7:49 PM   Alcohol Cap Used No 1/29/2020 10:50 PM                         Readmission Risk Assessment Tool Score High Risk            28       Total Score        3 Has Seen PCP in Last 6 Months (Yes=3, No=0)    9 IP Visits Last 12 Months (1-3=4, 4=9, >4=11)    9 Pt.  Coverage (Medicare=5 , Medicaid, or Self-Pay=4)    7 Charlson Comorbidity Score (Age + Comorbid Conditions)        Criteria that do not apply:    . Living with Significant Other. Assisted Living. LTAC. SNF.  or   Rehab    Patient Length of Stay (>5 days = 3)       Expected Length of Stay 4d 19h   Actual Length of Stay 3

## 2020-02-01 NOTE — PROGRESS NOTES
ADULT PROTOCOL: JET AEROSOL  REASSESSMENT    Patient  Adrianna Villarreal     54 y.o.   female     1/31/2020  8:10 PM    Breath Sounds Pre Procedure: Right Breath Sounds: Diminished                               Left Breath Sounds: Diminished    Breath Sounds Post Procedure: Right Breath Sounds: Diminished                                 Left Breath Sounds: Diminished    Breathing pattern: Pre procedure Breathing Pattern: Regular          Post procedure Breathing Pattern: Regular    Heart Rate: Pre procedure Pulse: 74           Post procedure Pulse: 77    Resp Rate: Pre procedure Respirations: 20           Post procedure Respirations: 18      Oxygen: O2 Device: Nasal cannula        Changed: NO    SpO2: Pre procedure SpO2: 94 %   with oxygen              Post procedure SpO2: 97 %  with oxygen    Nebulizer Therapy: Current medications Aerosolized Medications: DuoNeb, Pulmicort      Changed: NO QID RESP    Smoking History: FORMER SMOKER    Problem List:   Patient Active Problem List   Diagnosis Code    Malignant essential hypertension I10    Asthma J45.909    Obesity hypoventilation syndrome ([facility]) E66.2    Dyspnea R06.00    Chest pressure R07.89    Acute on chronic diastolic heart failure ([facility]) I50.33    NSTEMI (non-ST elevated myocardial infarction) ([facility]) I21.4    S/P PTCA (percutaneous transluminal coronary angioplasty) Z98.61    Coronary atherosclerosis of native coronary artery I25.10    Hypoxia R09.02    Noncompliance with therapeutic plan Z91.11    Chest pain R07.9    GERD (gastroesophageal reflux disease) K21.9    Acute respiratory failure with hypoxia and hypercarbia ([facility]) J96.01, J96.02    COPD (chronic obstructive pulmonary disease) ([facility]) J44.9    Tobacco abuse Z72.0    BMI 60.0-69.9, adult ([facility]) Z68.44    Cellulitis L03.90    SIRS due to infectious process with acute organ dysfunction ([facility]) A41.9, R65.20    Sepsis associated hypotension ([facility]) A41.9, I95.9    Gangrenous toe ([facility]) I96    Type II diabetes mellitus, uncontrolled ([facility] E11.65    HTN (hypertension) B12    Diastolic CHF, chronic ([facility]) I50.32    Cough with hemoptysis R04.2    Lymphedema of both lower extremities I89.0    CAP (community acquired pneumonia) J18.9    Cellulitis, leg L03.119    COPD exacerbation ([facility]) J44.1    Maggot infestation B87.9    Shortness of breath R06.02    Multifocal pneumonia J18.9    Acute respiratory failure ([facility]) J96.00    Acute on chronic respiratory failure with hypoxia and hypercapnia ([facility]) J96.21, J96.22    NSVT (nonsustained ventricular tachycardia) ([facility]) I47.2    Respiratory failure ([facility]) J96.90    Elevated lipase R74.8    Abdominal pain R10.9    Counseling regarding goals of care Z71.89    Acute pancreatitis K85.90    Breast cancer ([facility]) C50.919    Intertrigo L30.4    PNA (pneumonia) J18.9    Productive cough R05    Other fatigue R53.83    Acute chest pain R07.9    Acute on chronic respiratory failure with hypoxia ([facility]) J96.21    Acute on chronic respiratory failure with hypercapnia ([facility]) J96.22    Hypercapnic respiratory failure [facility]) J96.92       Respiratory Therapist: Ying Rodrigues RT

## 2020-02-01 NOTE — PROGRESS NOTES
Hospitalist Progress Note    NAME: Henry Jaimes   :  1970   MRN:  [id number]       Assessment / Plan:  Sepsis POA/acute bronchitis  Source of infection is likely acute bronchitis as patient reports thick greenish sputum production and shortness of breath  Chest x-ray did not show any evidence of pneumonia   lactic acid level 1.6   on doxycycline for acute bronchitis  0.10 is procalcitonin on admit  FU sputum and blood cx (NGTD), urinalysis neg for infection; MODERATE NORMAL RESPIRATORY AMRIK   Avoid giving fluids due to history of heart failure     Acute on chronic hypoxic and hypercapnic respiratory failure/COPD exacerbation  OHS, is suppose to use trilogy at home  Likely secondary acute bronchitis  Solu-Medrol 40mg every 6 (titrate) and duo nebs 4 times a day  Maintain oxygenation at 92%  Patient uses 4 L at home and is needing up to 6 L on admission  Restart BIPAP     Hypercalcemia  -decreasing, cont to monitor  Hyperkalemia  -ángel excelate times one and repeat lab later today     Chronic diastolic CHF  Continue Lasix 40 mg twice daily  I's and O's, Daily weight -weight steady     Insulin-dependent type 2 diabetes:  Home dose of Lantus which is 35 units QHS per the pharmacy ntoe - use 40 units as of  given increased sugars assoc with steroids  Stop glyburide, use sliding scale insulin     Hypertension  Continue metoprolol     Coronary artery disease  Continue aspirin, statin, beta-blocker       Severely morbid obesity:  Due to excess caloric intake     L2 sclerotic lesion/history of breast cancer:  Patient has a PET scan ordered for this coming Monday - we wont be able to get A pet here due to weight restrictions  She has an outpatient follow-up with her oncologist        Code Status: Full code  Surrogate Decision Maker: Cecile [phone]), Viri Geronimo(son [phone])     DVT Prophylaxis: Lovenox      Subjective:     Chief Complaint / Reason for Physician Visit  Noticing some streaks of blood in her sputum; does not feel her breathing is back to baseline. \"Im still wheezing. \"    Review of Systems:  Symptom Y/N Comments  Symptom Y/N Comments   Fever/Chills n   Chest Pain      Poor Appetite    Edema     Cough y   Abdominal Pain     Sputum y   Joint Pain     SOB/RAZO y   Pruritis/Rash     Nausea/vomit     Tolerating PT/OT     Diarrhea    Tolerating Diet     Constipation    Other        Could NOT obtain due to:      Objective:     VITALS:   Last 24hrs VS reviewed since prior progress note. Most recent are:  Patient Vitals for the past 24 hrs:   Temp Pulse Resp BP SpO2   02/01/20 1136 96.7 °F (35.9 °C) 66 18 113/79 95 %   02/01/20 1122     93 %   02/01/20 0809 97.5 °F (36.4 °C) 73 18 142/89 92 %   02/01/20 0805     91 %   02/01/20 0426 97.3 °F (36.3 °C) 69 18 111/64 94 %   01/31/20 2346 98.8 °F (37.1 °C) 73 20 120/69 98 %   01/31/20 1949     94 %   01/31/20 1943 97.4 °F (36.3 °C) 71 18 133/71 93 %   01/31/20 1549     90 %   01/31/20 1534 97.9 °F (36.6 °C) 75 18 122/74 93 %       Intake/Output Summary (Last 24 hours) at 2/1/2020 1221  Last data filed at 1/31/2020 2346  Gross per 24 hour   Intake 830 ml   Output 1000 ml   Net -170 ml        PHYSICAL EXAM:  General: WD, WN. Alert, cooperative, no acute distress  , obese  EENT:  EOMI. Anicteric sclerae. MMM  Resp:  +wheeze b/l with very decreased BS.   No accessory muscle use  CV:  Regular  Rhythm by pulse,  ++ edema  GI:  Soft, Non distended, Non tender.  +Bowel sounds  Neurologic:  Alert and oriented X 3, normal speech,   Psych:   fair insight. Not anxious nor agitated  Skin:  No jaundice, LE with changes c/w chronic venous stasis including lichenified skin     Reviewed most current lab test results and cultures  YES  Reviewed most current radiology test results   YES  Review and summation of old records today    NO  Reviewed patient's current orders and MAR    YES  PMH/SH reviewed - no change compared to H&P  ________________________________________________________________________  Care Plan discussed with:    Comments   Patient y    [facility]                        Multidiciplinary team rounds were held today with , nursing, pharmacist and clinical coordinator. Patient's plan of care was discussed; medications were reviewed and discharge planning was addressed. ________________________________________________________________________  Total NON critical care TIME:      Minutes    Total CRITICAL CARE TIME Spent:   Minutes non procedure based      Comments   >50% of visit spent in counseling and coordination of care     ________________________________________________________________________  Cherry Thornton MD     Procedures: see electronic medical records for all procedures/Xrays and details which were not copied into this note but were reviewed prior to creation of Plan. LABS:  I reviewed today's most current labs and imaging studies.   Pertinent labs include:  Recent Labs     02/01/20 0933 01/31/20  0334 01/30/20  1050   WBC 11.5* 12.6* 12.4*   HGB 13.8 13.6 13.6   HCT 46.6 45.7 45.1    171 96*     Recent Labs     02/01/20  0933 01/31/20  0334 01/30/20  1050 01/29/20  1423   * 134* 136 137   K 5.4* 4.5 4.7 5.1   CL 96* 99 99 100   CO2 37* 35* 34* 33*   * 246* 256* 209*   BUN 21* 20 18 15   CREA 0.96 0.97 1.01 0.76   CA 9.9 10.3* 10.6* 11.1*   ALB  --   --   --  3.6   TBILI  --   --   --  0.5   SGOT  --   --   --  27   ALT  --   --   --  24       Signed: Cherry Thornton MD

## 2020-02-01 NOTE — PROGRESS NOTES
Problem: Chronic Obstructive Pulmonary Disease (COPD)  Goal: *Oxygen saturation during activity within specified parameters  Outcome: Progressing Towards Goal  Goal: *Able to remain out of bed as prescribed  Outcome: Progressing Towards Goal  Goal: *Absence of hypoxia  Outcome: Progressing Towards Goal     Problem: Patient Education: Go to Patient Education Activity  Goal: Patient/Family Education  Outcome: Progressing Towards Goal     Problem: Falls - Risk of  Goal: *Absence of Falls  Description  Document Easter Getting Fall Risk and appropriate interventions in the flowsheet. Outcome: Progressing Towards Goal  Note: Fall Risk Interventions:  Mobility Interventions: Bed/chair exit alarm, Patient to call before getting OOB         Medication Interventions: Bed/chair exit alarm, Evaluate medications/consider consulting pharmacy, Patient to call before getting OOB, Teach patient to arise slowly    Elimination Interventions: Call light in reach, Bed/chair exit alarm              Problem: Patient Education: Go to Patient Education Activity  Goal: Patient/Family Education  Outcome: Progressing Towards Goal     Problem: Pressure Injury - Risk of  Goal: *Prevention of pressure injury  Description  Document Jeffrey Scale and appropriate interventions in the flowsheet.   Outcome: Progressing Towards Goal  Note: Pressure Injury Interventions:  Sensory Interventions: Keep linens dry and wrinkle-free, Maintain/enhance activity level, Minimize linen layers, Monitor skin under medical devices    Moisture Interventions: Absorbent underpads, Offer toileting Q_hr, Minimize layers    Activity Interventions: Increase time out of bed    Mobility Interventions: HOB 30 degrees or less    Nutrition Interventions: Document food/fluid/supplement intake    Friction and Shear Interventions: HOB 30 degrees or less, Lift team/patient mobility team, Lift sheet, Minimize layers                Problem: Patient Education: Go to Patient Education Activity  Goal: Patient/Family Education  Outcome: Progressing Towards Goal     Problem: Risk for Spread of Infection  Goal: Prevent transmission of infectious organism to others  Description  Prevent the transmission of infectious organisms to other patients, staff members, and visitors.   Outcome: Progressing Towards Goal     Problem: Patient Education:  Go to Education Activity  Goal: Patient/Family Education  Outcome: Progressing Towards Goal     Problem: Breathing Pattern - Ineffective  Goal: *Absence of hypoxia  Outcome: Progressing Towards Goal  Goal: *Use of effective breathing techniques  Outcome: Progressing Towards Goal     Problem: Patient Education: Go to Patient Education Activity  Goal: Patient/Family Education  Outcome: Progressing Towards Goal

## 2020-02-02 VITALS
DIASTOLIC BLOOD PRESSURE: 91 MMHG | HEIGHT: 67 IN | WEIGHT: 293 LBS | BODY MASS INDEX: 45.99 KG/M2 | HEART RATE: 67 BPM | RESPIRATION RATE: 19 BRPM | OXYGEN SATURATION: 92 % | TEMPERATURE: 98 F | SYSTOLIC BLOOD PRESSURE: 142 MMHG

## 2020-02-02 LAB
ANION GAP SERPL CALC-SCNC: 0 MMOL/L (ref 5–15)
BUN SERPL-MCNC: 26 MG/DL (ref 6–20)
BUN/CREAT SERPL: 26 (ref 12–20)
CALCIUM SERPL-MCNC: 9.5 MG/DL (ref 8.5–10.1)
CHLORIDE SERPL-SCNC: 96 MMOL/L (ref 97–108)
CO2 SERPL-SCNC: 39 MMOL/L (ref 21–32)
CREAT SERPL-MCNC: 1 MG/DL (ref 0.55–1.02)
ERYTHROCYTE [DISTWIDTH] IN BLOOD BY AUTOMATED COUNT: 12.9 % (ref 11.5–14.5)
GLUCOSE BLD STRIP.AUTO-MCNC: 205 MG/DL (ref 65–100)
GLUCOSE BLD STRIP.AUTO-MCNC: 220 MG/DL (ref 65–100)
GLUCOSE BLD STRIP.AUTO-MCNC: 223 MG/DL (ref 65–100)
GLUCOSE SERPL-MCNC: 266 MG/DL (ref 65–100)
HCT VFR BLD AUTO: 47.3 % (ref 35–47)
HGB BLD-MCNC: 14.2 G/DL (ref 11.5–16)
MCH RBC QN AUTO: 27.6 PG (ref 26–34)
MCHC RBC AUTO-ENTMCNC: 30 G/DL (ref 30–36.5)
MCV RBC AUTO: 91.8 FL (ref 80–99)
NRBC # BLD: 0 K/UL (ref 0–0.01)
NRBC BLD-RTO: 0 PER 100 WBC
PLATELET # BLD AUTO: 188 K/UL (ref 150–400)
PMV BLD AUTO: 10.8 FL (ref 8.9–12.9)
POTASSIUM SERPL-SCNC: 4.5 MMOL/L (ref 3.5–5.1)
RBC # BLD AUTO: 5.15 M/UL (ref 3.8–5.2)
SERVICE CMNT-IMP: ABNORMAL
SODIUM SERPL-SCNC: 135 MMOL/L (ref 136–145)
WBC # BLD AUTO: 10.9 K/UL (ref 3.6–11)

## 2020-02-02 PROCEDURE — 5A09357 ASSISTANCE WITH RESPIRATORY VENTILATION, LESS THAN 24 CONSECUTIVE HOURS, CONTINUOUS POSITIVE AIRWAY PRESSURE: ICD-10-PCS | Performed by: INTERNAL MEDICINE

## 2020-02-02 PROCEDURE — 74011250637 HC RX REV CODE- 250/637: Performed by: INTERNAL MEDICINE

## 2020-02-02 PROCEDURE — 80048 BASIC METABOLIC PNL TOTAL CA: CPT

## 2020-02-02 PROCEDURE — 85027 COMPLETE CBC AUTOMATED: CPT

## 2020-02-02 PROCEDURE — 77030012879 HC MSK CPAP FLL FAC PHIL -B

## 2020-02-02 PROCEDURE — 74011000250 HC RX REV CODE- 250: Performed by: INTERNAL MEDICINE

## 2020-02-02 PROCEDURE — 94660 CPAP INITIATION&MGMT: CPT

## 2020-02-02 PROCEDURE — 94640 AIRWAY INHALATION TREATMENT: CPT

## 2020-02-02 PROCEDURE — 74011250636 HC RX REV CODE- 250/636: Performed by: INTERNAL MEDICINE

## 2020-02-02 PROCEDURE — 94760 N-INVAS EAR/PLS OXIMETRY 1: CPT

## 2020-02-02 PROCEDURE — 74011636637 HC RX REV CODE- 636/637: Performed by: INTERNAL MEDICINE

## 2020-02-02 PROCEDURE — 82962 GLUCOSE BLOOD TEST: CPT

## 2020-02-02 PROCEDURE — 77010033678 HC OXYGEN DAILY

## 2020-02-02 PROCEDURE — 36415 COLL VENOUS BLD VENIPUNCTURE: CPT

## 2020-02-02 RX ORDER — PREDNISONE 10 MG/1
TABLET ORAL
Qty: 30 TAB | Refills: 0 | Status: SHIPPED | OUTPATIENT
Start: 2020-02-02 | End: 2020-02-14

## 2020-02-02 RX ORDER — GUAIFENESIN 600 MG/1
600 TABLET, EXTENDED RELEASE ORAL EVERY 12 HOURS
Qty: 20 TAB | Refills: 0 | Status: SHIPPED | OUTPATIENT
Start: 2020-02-02 | End: 2020-02-12

## 2020-02-02 RX ADMIN — FLUTICASONE PROPIONATE 2 SPRAY: 50 SPRAY, METERED NASAL at 09:19

## 2020-02-02 RX ADMIN — BUDESONIDE 1000 MCG: 0.5 INHALANT RESPIRATORY (INHALATION) at 08:22

## 2020-02-02 RX ADMIN — GUAIFENESIN 600 MG: 600 TABLET, EXTENDED RELEASE ORAL at 09:17

## 2020-02-02 RX ADMIN — IPRATROPIUM BROMIDE AND ALBUTEROL SULFATE 3 ML: .5; 3 SOLUTION RESPIRATORY (INHALATION) at 11:12

## 2020-02-02 RX ADMIN — INSULIN LISPRO 3 UNITS: 100 INJECTION, SOLUTION INTRAVENOUS; SUBCUTANEOUS at 09:15

## 2020-02-02 RX ADMIN — METHYLPREDNISOLONE SODIUM SUCCINATE 40 MG: 40 INJECTION, POWDER, FOR SOLUTION INTRAMUSCULAR; INTRAVENOUS at 11:36

## 2020-02-02 RX ADMIN — Medication 10 ML: at 06:04

## 2020-02-02 RX ADMIN — ARFORMOTEROL TARTRATE 15 MCG: 15 SOLUTION RESPIRATORY (INHALATION) at 08:22

## 2020-02-02 RX ADMIN — PANTOPRAZOLE SODIUM 40 MG: 40 TABLET, DELAYED RELEASE ORAL at 09:17

## 2020-02-02 RX ADMIN — DOXYCYCLINE HYCLATE 100 MG: 100 TABLET, COATED ORAL at 09:17

## 2020-02-02 RX ADMIN — LORATADINE 10 MG: 10 TABLET ORAL at 09:17

## 2020-02-02 RX ADMIN — FUROSEMIDE 40 MG: 40 TABLET ORAL at 09:17

## 2020-02-02 RX ADMIN — METOPROLOL TARTRATE 25 MG: 25 TABLET ORAL at 09:17

## 2020-02-02 RX ADMIN — IPRATROPIUM BROMIDE AND ALBUTEROL SULFATE 3 ML: .5; 3 SOLUTION RESPIRATORY (INHALATION) at 15:26

## 2020-02-02 RX ADMIN — CYCLOBENZAPRINE 5 MG: 10 TABLET, FILM COATED ORAL at 11:45

## 2020-02-02 RX ADMIN — INSULIN GLARGINE 40 UNITS: 100 INJECTION, SOLUTION SUBCUTANEOUS at 09:15

## 2020-02-02 RX ADMIN — ASPIRIN 81 MG 81 MG: 81 TABLET ORAL at 09:17

## 2020-02-02 RX ADMIN — ENOXAPARIN SODIUM 40 MG: 40 INJECTION SUBCUTANEOUS at 09:17

## 2020-02-02 RX ADMIN — INSULIN LISPRO 3 UNITS: 100 INJECTION, SOLUTION INTRAVENOUS; SUBCUTANEOUS at 11:36

## 2020-02-02 NOTE — PROGRESS NOTES
Bedside and Verbal shift change report GIVEN TO , RN. Report included the following information Kardex. SIGNIFICANT CHANGES DURING SHIFT:    1930 PICC team arrived to draw blood   Pt has not had results from Kayexalate yet   Pt declined labs . 2200 pt moving bowels and called  RN to ask Md for Imodium D I explained to her  This would not be given at this time ,because the the purpose of kayexalate was to move her bowels and to lower potassium       0600 We attempted to draw am labs unsuccessful   Tele hospitalist messaged for arterial stick ,he tried 2 times ,and was also unsuccessful  was passed this information on bedside report to let. Hospitalist md aware in am  We spoke to ED tech and no tech available for ultrasound lad stick    Hospitalist md aware in am   CONCERNS TO ADDRESS WITH MD:            [facility] NURSING NOTE   Admission Date 1/29/2020   Admission Diagnosis COPD exacerbation ([facility] [address] [J44.1]   Consults None      Cardiac Monitoring [x] Yes [] No      Purposeful Hourly Rounding [x] Yes    Yu Score Total Score: 2   Yu score 3 or > [x] Bed Alarm [] Avasys [] 1:1 sitter [] Patient refused (Signed refusal form in chart)   Jeffrey Score Jeffrey Score: 17   Jeffrey score 14 or < [] PMT consult [] Wound Care consult    []  Specialty bed  [] Nutrition consult      Influenza Vaccine Received Flu Vaccine for Current Season (usually Sept-March): Yes           Oxygen needs? [] Room air Oxygen @  []1L    []2L    []3L   []4L    [x]5L   []6L via  NC   Chronic home O2 use?  [x] Yes [] No  Perform O2 challenge test and document in progress note using smartphrase (.Homeoxygen)      Last bowel movement Last Bowel Movement Date: 02/01/20      Urinary Catheter             LDAs               Peripheral IV 01/29/20 Right Antecubital (Active)   Site Assessment Clean, dry, & intact 2/1/2020  7:32 PM   Phlebitis Assessment 0 2/1/2020  7:32 PM   Infiltration Assessment 0 2/1/2020  7:32 PM   Dressing Status Clean, dry, & intact 2/1/2020  7:32 PM   Dressing Type Tape;Transparent 2/1/2020  7:32 PM   Hub Color/Line Status Pink;Capped;Flushed;Patent 2/1/2020  7:32 PM   Alcohol Cap Used No 1/29/2020 10:50 PM                         Readmission Risk Assessment Tool Score High Risk            28       Total Score        3 Has Seen PCP in Last 6 Months (Yes=3, No=0)    9 IP Visits Last 12 Months (1-3=4, 4=9, >4=11)    9 Pt. Coverage (Medicare=5 , Medicaid, or Self-Pay=4)    7 Charlson Comorbidity Score (Age + Comorbid Conditions)        Criteria that do not apply:    . Living with Significant Other. Assisted Living. LTAC. SNF.  or   Rehab    Patient Length of Stay (>5 days = 3)       Expected Length of Stay 4d 19h   Actual Length of Stay 4

## 2020-02-02 NOTE — PROGRESS NOTES
8: md notified that overnight staff unable to obtain labs. 803: rn supervisor Eric Harding pursuing acquisition of pt labs. 1126: [facility] states pt ok to go from CM standpoint. 1255: rn set up 3p transport with [facility] as that is when they were first available. 407: Discharge instructions reviewed with pt to pt's satisfaction. Signed copy on pt's paper chart and original given to pt. 2 new rxs given to pt. Iv/tele removed. Pt d/c'd to home via [facility].

## 2020-02-02 NOTE — DISCHARGE SUMMARY
Hospitalist Discharge Summary     Patient ID:  Zee Rojo  [id number]  20 y.o.  1970 1/29/2020    PCP on record: Lelia Tovar MD    Admit date: 1/29/2020  Discharge date and time: 2/2/2020    DISCHARGE DIAGNOSIS:  DISCHARGE SUMMARY/HOSPITAL COURSE:  for full details see H&P, daily progress notes, labs, consult notes.      Sepsis criteria POA/acute bronchitis  Source of infection is likely acute bronchitis as patient reports thick greenish sputum production and shortness of breath  Chest x-ray did not show any evidence of pneumonia   lactic acid level 1.6   on doxycycline for acute bronchitis -got 5 days  0.10 is procalcitonin on admit  FU sputum and blood cx (NGTD), urinalysis neg for infection; MODERATE NORMAL RESPIRATORY AMRIK   Avoided giving fluids due to history of heart failure     Acute on chronic hypoxic and hypercapnic respiratory failure/COPD exacerbation  OHS, is suppose to use trilogy at home  Likely secondary acute bronchitis  Solu-Medrol 40mg every 6 (titrate) and duo nebs 4 times a day  Maintain oxygenation at 92%  Patient uses 4 L at home and is needing up to 6 L on admission  Restarted BIPAP at night     Hypercalcemia  -decreasing, cont to monitor  Hyperkalemia  -ángel excelate times one and repeat lab showed it decreased     Chronic diastolic CHF  Continue Lasix 40 mg twice daily  I's and O's, Daily weight -weight steady     Insulin-dependent type 2 diabetes:  Home dose of Lantus which is 35 units QHS per the pharmacy ntoe - use 40 units as of 2/1 given increased sugars assoc with steroids  Stop glyburide, use sliding scale insulin     Hypertension  Continue metoprolol     Coronary artery disease  Continue aspirin, statin, beta-blocker       Severely morbid obesity:  Due to excess caloric intake     L2 sclerotic lesion/history of breast cancer:  Patient has a PET scan ordered for this coming Monday - we wont be able to get A pet here due to weight restrictions  She has an outpatient follow-up with her oncologist        Code Status: Full code    CONSULTATIONS:  None    Excerpted HPI from H&P of Maegan Mccarthy MD:    Shahid Bloom is a 54 y.o.  female with past medical history significant for COPD with chronic hypoxic and hypercapnic respiratory failure requiring 4 L oxygen at her baseline with noncompliance to the BiPAP as she cannot tolerate it, chronic CHF, insulin-dependent type 2 diabetes, severely morbid obesity, hypertension and coronary artery disease who had multiple hospitalization due to COPD exacerbation who presents with complaints of shortness of breath for 1 day. Patient reports since yesterday she has been having cough productive of yellow sputum and increasing work of breathing with shortness of breath. She uses 4 L oxygen at her baseline was noted to be hypoxic by dispatch health with oxygen saturation in low 80s on 4 L. She denies any orthopnea but reports exertional dyspnea. Patient denies any subjective fever or chills. She denies any worsening lower extremity edema. In the ED she was noted to be hypoxic in low 80s on 4 L and oxygen saturation improved to low 90s on 6 L. He is also noted to have leukocytosis, tachycardia and tachypnea. She was given steroids and breathing treatments in the ED with little to no improvement  Patient denies any chest pain, palpitations, subjective fever or chills, nausea, vomiting, diarrhea, urinary consistency dysuria, heat or cold intolerance     _  _______________________________________________________________________  Patient seen and examined by me on discharge day.   Pertinent Findings:  Gen:    Not in distress  Chest: Clear lungs, decreased BS b/l  CVS:   Regular rhythm.     _______________________________________________________________________  DISCHARGE MEDICATIONS:   Current Discharge Medication List      START taking these medications    Details   guaiFENesin ER ([address]) 600 mg ER tablet Take 1 Tab by mouth every twelve (12) hours for 10 days. Qty: 20 Tab, Refills: 0      predniSONE (STERAPRED DS) 10 mg dose pack Take 6 Tabs by mouth daily for 3 days, THEN 4 Tabs daily for 3 days, THEN 2 Tabs daily for 3 days, THEN 1 Tab daily for 3 days. See administration instruction per 10mg dose pack  Qty: 30 Tab, Refills: 0         CONTINUE these medications which have NOT CHANGED    Details   ammonium lactate (LAC-HYDRIN) 12 % lotion Apply 1 Applicator to affected area as needed. rub in to affected area well      omeprazole (PRILOSEC) 40 mg capsule Take 40 mg by mouth daily. potassium chloride SR (KLOR-CON 10) 10 mEq tablet Take 10 mEq by mouth four (4) times daily. nystatin (MYCOSTATIN) topical cream Apply  to affected area two (2) times a day. Qty: 15 g, Refills: 0      cyclobenzaprine (FLEXERIL) 5 mg tablet Take 1 Tab by mouth two (2) times daily as needed for Muscle Spasm(s). flexeriol  Qty: 5 Tab, Refills: 0      albuterol-ipratropium (DUO-NEB) 2.5 mg-0.5 mg/3 ml nebu 3 mL by Nebulization route four (4) times daily. Qty: 100 Nebule, Refills: 1      furosemide (LASIX) 40 mg tablet Take 40 mg by mouth two (2) times a day. fluticasone (FLONASE) 50 mcg/actuation nasal spray 2 Sprays by Both Nostrils route daily. Qty: 1 Bottle, Refills: 0      insulin glargine (LANTUS) 100 unit/mL injection 35 Units by SubCUTAneous route daily. metoprolol tartrate (LOPRESSOR) 25 mg tablet Take 25 mg by mouth two (2) times a day. fluticasone-salmeterol (ADVAIR DISKUS) 500-50 mcg/dose diskus inhaler Take 1 Puff by inhalation daily. loratadine (CLARITIN) 10 mg tablet Take 10 mg by mouth daily. albuterol (VENTOLIN HFA) 90 mcg/actuation inhaler Take 2 Puffs by inhalation every six (6) hours as needed for Wheezing. aspirin 81 mg chewable tablet Take 81 mg by mouth daily. lovastatin (MEVACOR) 40 mg tablet Take 1 Tab by mouth nightly.   Qty: 30 Tab, Refills: 6 Associated Diagnoses: Atherosclerosis of native coronary artery without angina pectoris      glyBURIDE (DIABETA) 5 mg tablet Take 5 mg by mouth two (2) times daily (with meals). nitroglycerin (NITROSTAT) 0.4 mg SL tablet 1 Tab by SubLINGual route every five (5) minutes as needed for Chest Pain (call 911 if not relieved by 3). Qty: 25 Tab, Refills: 2    Associated Diagnoses: SVT (supraventricular tachycardia) ([facility]); CHF (congestive heart failure) ([facility] [address]         STOP taking these medications       oxyCODONE IR (ROXICODONE) 5 mg immediate release tablet Comments:   Reason for Stopping:         hydrOXYzine pamoate (VISTARIL) 50 mg capsule Comments:   Reason for Stopping:                 Patient Follow Up Instructions:     See DC instructions      Follow-up Information     Follow up With Specialties Details Why Contact Info    [address] [place] [address]  [phone]      [facility] Clinical Assessments On 2/4/2020 Expect a phone call from [place] to schedule a follow up visit with you in 24-48 hours. [facility] [address] [facility]  [phone]        ________________________________________________________________    Condition at Discharge:  Stable  __________________________________________________________________    Disposition  Home with family, no needs    Total time in minutes spent coordinating this discharge (includes going over instructions, follow-up, prescriptions, and preparing report for sign off to her PCP) :  32  minutes    Signed:  Buster Garnett MD

## 2020-02-02 NOTE — DISCHARGE INSTRUCTIONS
Patient Education        COPD Exacerbation Plan: Care Instructions  Your Care Instructions    If you have chronic obstructive pulmonary disease (COPD), your usual shortness of breath could suddenly get worse. You may start coughing more and have more mucus. This flare-up is called a COPD exacerbation (say \"kl-VRZ-fr-BAY-derrick\"). A lung infection or air pollution could set off an exacerbation. Sometimes it can happen after a quick change in temperature or being around chemicals. Work with your doctor to make a plan for dealing with an exacerbation. You can better manage it if you plan ahead. Follow-up care is a key part of your treatment and safety. Be sure to make and go to all appointments, and call your doctor if you are having problems. It's also a good idea to know your test results and keep a list of the medicines you take. How can you care for yourself at home? During an exacerbation  · Do not panic if you start to have one. Quick treatment at home may help you prevent serious breathing problems. If you have a COPD exacerbation plan that you developed with your doctor, follow it. · Take your medicines exactly as your doctor tells you.  ? Use your inhaler as directed by your doctor. If your symptoms do not get better after you use your medicine, have someone take you to the emergency room. Call an ambulance if necessary. ? With inhaled medicines, a spacer or a nebulizer may help you get more medicine to your lungs. Ask your doctor or pharmacist how to use them properly. Practice using the spacer in front of a mirror before you have an exacerbation. This may help you get the medicine into your lungs quickly. ? If your doctor has given you steroid pills, take them as directed. ? Your doctor may have given you a prescription for antibiotics, which you can fill if you need it. ? Talk to your doctor if you have any problems with your medicine.  And call your doctor if you have to use your antibiotic or steroid pills. Preventing an exacerbation  · Do not smoke. This is the most important step you can take to prevent more damage to your lungs and prevent problems. If you already smoke, it is never too late to stop. If you need help quitting, talk to your doctor about stop-smoking programs and medicines. These can increase your chances of quitting for good. · Take your daily medicines as prescribed. · Avoid colds and flu. ? Get a pneumococcal vaccine. ? Get a flu vaccine each year, as soon as it is available. Ask those you live or work with to do the same, so they will not get the flu and infect you. ? Try to stay away from people with colds or the flu. ? Wash your hands often. · Avoid secondhand smoke; air pollution; cold, dry air; hot, humid air; and high altitudes. Stay at home with your windows closed when air pollution is bad. · Learn breathing techniques for COPD, such as breathing through pursed lips. These techniques can help you breathe easier during an exacerbation. When should you call for help? Call 911 anytime you think you may need emergency care. For example, call if:    · You have severe trouble breathing.     · You have severe chest pain.    Call your doctor now or seek immediate medical care if:    · You have new or worse shortness of breath.     · You develop new chest pain.     · You are coughing more deeply or more often, especially if you notice more mucus or a change in the color of your mucus.     · You cough up blood.     · You have new or increased swelling in your legs or belly.     · You have a fever.    Watch closely for changes in your health, and be sure to contact your doctor if:    · You need to use your antibiotic or steroid pills.     · Your symptoms are getting worse. Where can you learn more? Go to http://cassia-donte.info/. Enter T731 in the search box to learn more about \"COPD Exacerbation Plan: Care Instructions. \"  Current as of: June 9, 2019  Content Version: 12.2  © 4202-0333 [employer]. Care instructions adapted under license by [employer] (which disclaims liability or warranty for this information). If you have questions about a medical condition or this instruction, always ask your healthcare professional. [address] any warranty or liability for your use of this information. Discharge Instructions       PATIENT ID: Adrianna Villarreal  MRN: [id number]   YOB: 1970    DATE OF ADMISSION: 1/29/2020 11:51 AM    DATE OF DISCHARGE: 2/2/2020    PRIMARY CARE PROVIDER: Meri Mohan MD     ATTENDING PHYSICIAN: Deborah Andre MD  DISCHARGING PROVIDER: Vida Bates MD    To contact this individual call [phone] and ask the  to page. If unavailable ask to be transferred the Adult Hospitalist Department. DISCHARGE DIAGNOSES COPD exacerbation    CONSULTATIONS: None    PROCEDURES/SURGERIES: * No surgery found *      FOLLOW UP APPOINTMENTS:   Follow-up Information     Follow up With Specialties Details Why Contact Info    Meri Mohan [place] [address]  [phone]               DIET: Diabetic Diet    ACTIVITY: Activity as tolerated      DISCHARGE MEDICATIONS:   See Medication Reconciliation Form    · It is important that you take the medication exactly as they are prescribed. · Keep your medication in the bottles provided by the pharmacist and keep a list of the medication names, dosages, and times to be taken in your wallet. · Do not take other medications without consulting your doctor. NOTIFY YOUR PHYSICIAN FOR ANY OF THE FOLLOWING:   Fever over 101 degrees for 24 hours. Chest pain, shortness of breath, fever, chills, nausea, vomiting, diarrhea, change in mentation, falling, weakness, bleeding. Severe pain or pain not relieved by medications.   Or, any other signs or symptoms that you may have questions about.         Signed:   Vida Bates MD  2/2/2020  11:11 AM

## 2020-02-02 NOTE — PROGRESS NOTES
PCP LETICIA appt scheduled with [facility] to see PT in 24-48 hours after discharge at 9:00am. Appt added to [address] CM Specialist

## 2020-02-03 ENCOUNTER — PATIENT OUTREACH (OUTPATIENT)
Dept: FAMILY MEDICINE CLINIC | Age: 50
End: 2020-02-03

## 2020-02-03 NOTE — PROGRESS NOTES
2-3-20 at 10:56am: Care Transitions Nurse met briefly with patient, via phone, and patient states she is currently using her nebulizer and asked CTN to call back in ten-fifteen minutes. Hospital Discharge Follow-Up      Date/Time:  2/3/2020 12:03 PM  MYA Sepulveda, RN  Care Transitions Nurse  Phone: [phone]    Patient was admitted to [facility] on 1-29-20 and discharged on 2-2-20 for:    DISCHARGE DIAGNOSIS:  Sepsis criteria POA/acute bronchitis  Acute on chronic hypoxic and hypercapnic respiratory failure/COPD exacerbation  OHS  Hypercalcemia  Hyperkalemia  Chronic diastolic CHF  Insulin-dependent type 2 diabetes  Hypertension  Coronary artery disease  Severely morbid obesity  L2 sclerotic lesion/history of breast cancer    The [facility] ER physician discharge summary was available at the time of outreach. Patient was contacted within one business day of discharge. Challenges reviewed with the provider:   - Patient reports she has a Medicaid aide that visits her 7 days per week for 6 hours each day. - Patient reports that transportation is a barrier and she primarily depends on friends for her transportation needs. [facility] and her aide does errands and picks up her medications and groceries; however aide does not provide transportation. Patient has access to Medicaid transportation.   - Patient reports she has a productive cough with cloudy white sputum and can hear herself wheeze occasionally. - Patient states her aide plans to have prednisone 10mg dose pack filled at pharmacy today. - Patient states she is not currently utilizing Lac-Hydrin 12% lotion. Advance Care Planning:   Does patient have an Advance Directive:  reviewed and current        Method of communication with provider :none, PCP is [facility] and patient reports she sees their Nurse Practitioner. Was this a readmission?  no   Patient stated reason for the readmission: n/a    Care Transition Nurse (CTN) contacted the patient by telephone to perform post hospital discharge assessment. Verified name and  with patient as identifiers. Provided introduction to self, and explanation of the CTN role. Patient received hospital discharge instructions. CTN reviewed discharge instructions and red flags with patient who verbalized understanding. Patient given an opportunity to ask questions and does not have any further questions or concerns at this time. The patient agrees to contact the PCP office for questions related to their healthcare. CTN provided contact information for future reference. Disease Specific:   COPD exacerbation. Patients top risk factors for readmission:  medical condition    Home Health orders at discharge: [address]: n/a  Date of initial visit: [facility] ordered at discharge: none  [address] received: n/a    [facility]:   New Medications at Discharge:   START taking these medications     Details   guaiFENesin ER (MUCINEX) 600 mg ER tablet Take 1 Tab by mouth every twelve (12) hours for 10 days. Qty: 20 Tab, Refills: 0       predniSONE (STERAPRED DS) 10 mg dose pack Take 6 Tabs by mouth daily for 3 days, THEN 4 Tabs daily for 3 days, THEN 2 Tabs daily for 3 days, THEN 1 Tab daily for 3 days. See administration instruction per 10mg dose pack  Qty: 30 Tab, Refills: 0     Changed Medications at Discharge: none    Discontinued Medications at Discharge:   STOP taking these medications         oxyCODONE IR (ROXICODONE) 5 mg immediate release tablet Comments:   Reason for Stopping:            hydrOXYzine pamoate (VISTARIL) 50 mg capsule Comments:   Reason for Stopping:     Medication reconciliation was performed with patient, who verbalizes understanding of administration of home medications.   There were no barriers to obtaining medications identified at this time and patient states her medications are affordable. Patient states her aide picks up medications from the pharmacy for her. Referral to Pharm D needed: no     Current Outpatient Medications   Medication Sig    guaiFENesin ER (MUCINEX) 600 mg ER tablet Take 1 Tab by mouth every twelve (12) hours for 10 days.  omeprazole (PRILOSEC) 40 mg capsule Take 40 mg by mouth daily.  potassium chloride SR (KLOR-CON 10) 10 mEq tablet Take 10 mEq by mouth four (4) times daily.  nystatin (MYCOSTATIN) topical cream Apply  to affected area two (2) times a day.  cyclobenzaprine (FLEXERIL) 5 mg tablet Take 1 Tab by mouth two (2) times daily as needed for Muscle Spasm(s). flexeriol    albuterol-ipratropium (DUO-NEB) 2.5 mg-0.5 mg/3 ml nebu 3 mL by Nebulization route four (4) times daily.  furosemide (LASIX) 40 mg tablet Take 40 mg by mouth two (2) times a day.  fluticasone (FLONASE) 50 mcg/actuation nasal spray 2 Sprays by Both Nostrils route daily.  insulin glargine (LANTUS) 100 unit/mL injection 35 Units by SubCUTAneous route daily.  metoprolol tartrate (LOPRESSOR) 25 mg tablet Take 25 mg by mouth two (2) times a day.  fluticasone-salmeterol (ADVAIR DISKUS) 500-50 mcg/dose diskus inhaler Take 1 Puff by inhalation daily.  loratadine (CLARITIN) 10 mg tablet Take 10 mg by mouth daily.  albuterol (VENTOLIN HFA) 90 mcg/actuation inhaler Take 2 Puffs by inhalation every six (6) hours as needed for Wheezing.  aspirin 81 mg chewable tablet Take 81 mg by mouth daily.  lovastatin (MEVACOR) 40 mg tablet Take 1 Tab by mouth nightly.  glyBURIDE (DIABETA) 5 mg tablet Take 5 mg by mouth two (2) times daily (with meals).  nitroglycerin (NITROSTAT) 0.4 mg SL tablet 1 Tab by SubLINGual route every five (5) minutes as needed for Chest Pain (call 911 if not relieved by 3).     predniSONE (STERAPRED DS) 10 mg dose pack Take 6 Tabs by mouth daily for 3 days, THEN 4 Tabs daily for 3 days, THEN 2 Tabs daily for 3 days, THEN 1 Tab daily for 3 days. See administration instruction per 10mg dose pack    ammonium lactate (LAC-HYDRIN) 12 % lotion Apply 1 Applicator to affected area as needed. rub in to affected area well     No current facility-administered medications for this visit. There are no discontinued medications. [facility] follow up appointment(s):   Future Appointments   Date Time Provider Xochilt Davisi   2/7/2020 11:20 AM Stephania Perkins MD [address] [place] [address]      Non-[facility] follow up appointment(s): Patient states she will call the office of [facility] and [place] to schedule follow-up appointments today. Patient has phone numbers for both offices. [facility]:  Patient plans to call today to schedule an appontment for hospital follow-up visit.

## 2020-02-05 LAB
BACTERIA SPEC CULT: NORMAL
SERVICE CMNT-IMP: NORMAL

## 2020-02-06 NOTE — PROGRESS NOTES
Patient discharged with prednisone prescription without enough tablets to complete taper. Patient previously only received #30 tablets for a prednisone taper that required #39 tablets.  Called in #9 additional tablets to pharmacy per authorization from [facility] team.     Kaleigh MaderaD

## 2020-03-25 ENCOUNTER — PATIENT OUTREACH (OUTPATIENT)
Dept: CASE MANAGEMENT | Age: 50
End: 2020-03-25

## 2020-03-25 NOTE — PROGRESS NOTES
Patient has graduated from the Transitions of Care Coordination  program on 3/2/2020. Patient/family has the ability to self-manage at this time Care management goals have been completed. Patient was not referred to the [facility] team for further management. Goals Addressed    None         Patient has Care Transition Nurse's contact information for any further questions, concerns, or needs. Patients upcoming visits:  No future appointments.

## 2020-07-25 ENCOUNTER — APPOINTMENT (OUTPATIENT)
Dept: GENERAL RADIOLOGY | Age: 50
End: 2020-07-25
Attending: EMERGENCY MEDICINE
Payer: MEDICARE

## 2020-07-25 ENCOUNTER — HOSPITAL ENCOUNTER (EMERGENCY)
Age: 50
Discharge: HOME | End: 2020-07-26
Attending: EMERGENCY MEDICINE
Payer: MEDICARE

## 2020-07-25 DIAGNOSIS — J44.1 COPD EXACERBATION (HCC): Primary | ICD-10-CM

## 2020-07-25 LAB
ALBUMIN SERPL-MCNC: 3.2 G/DL (ref 3.5–5)
ALBUMIN/GLOB SERPL: 0.7 {RATIO} (ref 1.1–2.2)
ALP SERPL-CCNC: 92 U/L (ref 45–117)
ALT SERPL-CCNC: 20 U/L (ref 12–78)
ANION GAP SERPL CALC-SCNC: 3 MMOL/L (ref 5–15)
AST SERPL-CCNC: 17 U/L (ref 15–37)
BASOPHILS # BLD: 0 K/UL (ref 0–0.1)
BASOPHILS NFR BLD: 0 % (ref 0–1)
BILIRUB SERPL-MCNC: 0.3 MG/DL (ref 0.2–1)
BUN SERPL-MCNC: 18 MG/DL (ref 6–20)
BUN/CREAT SERPL: 25 (ref 12–20)
CALCIUM SERPL-MCNC: 10 MG/DL (ref 8.5–10.1)
CHLORIDE SERPL-SCNC: 103 MMOL/L (ref 97–108)
CO2 SERPL-SCNC: 35 MMOL/L (ref 21–32)
COMMENT, HOLDF: NORMAL
CREAT SERPL-MCNC: 0.71 MG/DL (ref 0.55–1.02)
DIFFERENTIAL METHOD BLD: ABNORMAL
EOSINOPHIL # BLD: 0.2 K/UL (ref 0–0.4)
EOSINOPHIL NFR BLD: 2 % (ref 0–7)
ERYTHROCYTE [DISTWIDTH] IN BLOOD BY AUTOMATED COUNT: 13.2 % (ref 11.5–14.5)
GLOBULIN SER CALC-MCNC: 4.8 G/DL (ref 2–4)
GLUCOSE SERPL-MCNC: 106 MG/DL (ref 65–100)
HCT VFR BLD AUTO: 43.3 % (ref 35–47)
HGB BLD-MCNC: 12.5 G/DL (ref 11.5–16)
IMM GRANULOCYTES # BLD AUTO: 0.1 K/UL (ref 0–0.04)
IMM GRANULOCYTES NFR BLD AUTO: 1 % (ref 0–0.5)
LYMPHOCYTES # BLD: 1 K/UL (ref 0.8–3.5)
LYMPHOCYTES NFR BLD: 12 % (ref 12–49)
MCH RBC QN AUTO: 28 PG (ref 26–34)
MCHC RBC AUTO-ENTMCNC: 28.9 G/DL (ref 30–36.5)
MCV RBC AUTO: 96.9 FL (ref 80–99)
MONOCYTES # BLD: 0.7 K/UL (ref 0–1)
MONOCYTES NFR BLD: 8 % (ref 5–13)
NEUTS SEG # BLD: 6.5 K/UL (ref 1.8–8)
NEUTS SEG NFR BLD: 77 % (ref 32–75)
NRBC # BLD: 0 K/UL (ref 0–0.01)
NRBC BLD-RTO: 0 PER 100 WBC
PLATELET # BLD AUTO: 159 K/UL (ref 150–400)
PMV BLD AUTO: 11.1 FL (ref 8.9–12.9)
POTASSIUM SERPL-SCNC: 4.2 MMOL/L (ref 3.5–5.1)
PROT SERPL-MCNC: 8 G/DL (ref 6.4–8.2)
RBC # BLD AUTO: 4.47 M/UL (ref 3.8–5.2)
RBC MORPH BLD: ABNORMAL
SAMPLES BEING HELD,HOLD: NORMAL
SODIUM SERPL-SCNC: 141 MMOL/L (ref 136–145)
WBC # BLD AUTO: 8.5 K/UL (ref 3.6–11)

## 2020-07-25 PROCEDURE — 93005 ELECTROCARDIOGRAM TRACING: CPT

## 2020-07-25 PROCEDURE — 82803 BLOOD GASES ANY COMBINATION: CPT

## 2020-07-25 PROCEDURE — 36415 COLL VENOUS BLD VENIPUNCTURE: CPT

## 2020-07-25 PROCEDURE — 94640 AIRWAY INHALATION TREATMENT: CPT

## 2020-07-25 PROCEDURE — 80053 COMPREHEN METABOLIC PANEL: CPT

## 2020-07-25 PROCEDURE — 99285 EMERGENCY DEPT VISIT HI MDM: CPT

## 2020-07-25 PROCEDURE — 85025 COMPLETE CBC W/AUTO DIFF WBC: CPT

## 2020-07-25 PROCEDURE — 71045 X-RAY EXAM CHEST 1 VIEW: CPT

## 2020-07-25 PROCEDURE — 84484 ASSAY OF TROPONIN QUANT: CPT

## 2020-07-25 PROCEDURE — 96374 THER/PROPH/DIAG INJ IV PUSH: CPT

## 2020-07-26 VITALS
HEART RATE: 80 BPM | RESPIRATION RATE: 20 BRPM | OXYGEN SATURATION: 90 % | SYSTOLIC BLOOD PRESSURE: 116 MMHG | TEMPERATURE: 97.8 F | WEIGHT: 293 LBS | DIASTOLIC BLOOD PRESSURE: 77 MMHG | HEIGHT: 67 IN | BODY MASS INDEX: 45.99 KG/M2

## 2020-07-26 LAB
ARTERIAL PATENCY WRIST A: ABNORMAL
ATRIAL RATE: 80 BPM
BASE EXCESS BLD CALC-SCNC: 10 MMOL/L
BASE EXCESS BLD CALC-SCNC: 13 MMOL/L
BASE EXCESS BLD CALC-SCNC: 14 MMOL/L
BDY SITE: ABNORMAL
CA-I BLD-SCNC: 1.37 MMOL/L (ref 1.12–1.32)
CA-I BLD-SCNC: 1.38 MMOL/L (ref 1.12–1.32)
CA-I BLD-SCNC: 1.44 MMOL/L (ref 1.12–1.32)
CALCULATED P AXIS, ECG09: 41 DEGREES
CALCULATED R AXIS, ECG10: 105 DEGREES
CALCULATED T AXIS, ECG11: 35 DEGREES
DIAGNOSIS, 93000: NORMAL
GAS FLOW.O2 O2 DELIVERY SYS: ABNORMAL L/MIN
GAS FLOW.O2 SETTING OXYMISER: 4 L/M
HCO3 BLD-SCNC: 36.4 MMOL/L (ref 22–26)
HCO3 BLD-SCNC: 38.9 MMOL/L (ref 22–26)
HCO3 BLD-SCNC: 40.5 MMOL/L (ref 22–26)
O2/TOTAL GAS SETTING VFR VENT: 100 %
O2/TOTAL GAS SETTING VFR VENT: 35 %
P-R INTERVAL, ECG05: 188 MS
PCO2 BLD: 69.5 MMHG (ref 35–45)
PCO2 BLD: 76.8 MMHG (ref 35–45)
PCO2 BLD: 84.4 MMHG (ref 35–45)
PEEP RESPIRATORY: 8 CMH2O
PH BLD: 7.29 [PH] (ref 7.35–7.45)
PH BLD: 7.31 [PH] (ref 7.35–7.45)
PH BLD: 7.33 [PH] (ref 7.35–7.45)
PO2 BLD: 24 MMHG (ref 80–100)
PO2 BLD: 55 MMHG (ref 80–100)
PO2 BLD: 59 MMHG (ref 80–100)
Q-T INTERVAL, ECG07: 374 MS
QRS DURATION, ECG06: 78 MS
QTC CALCULATION (BEZET), ECG08: 431 MS
SAO2 % BLD: 32 % (ref 92–97)
SAO2 % BLD: 83 % (ref 92–97)
SAO2 % BLD: 87 % (ref 92–97)
SPECIMEN TYPE: ABNORMAL
TOTAL RESP. RATE, ITRR: 20
TROPONIN I SERPL-MCNC: <0.05 NG/ML
VENTRICULAR RATE, ECG03: 80 BPM

## 2020-07-26 PROCEDURE — 96374 THER/PROPH/DIAG INJ IV PUSH: CPT

## 2020-07-26 PROCEDURE — 94660 CPAP INITIATION&MGMT: CPT

## 2020-07-26 PROCEDURE — 82803 BLOOD GASES ANY COMBINATION: CPT

## 2020-07-26 PROCEDURE — 94640 AIRWAY INHALATION TREATMENT: CPT

## 2020-07-26 PROCEDURE — 74011000250 HC RX REV CODE- 250: Performed by: EMERGENCY MEDICINE

## 2020-07-26 PROCEDURE — 74011250636 HC RX REV CODE- 250/636: Performed by: EMERGENCY MEDICINE

## 2020-07-26 RX ORDER — IPRATROPIUM BROMIDE AND ALBUTEROL SULFATE 2.5; .5 MG/3ML; MG/3ML
3 SOLUTION RESPIRATORY (INHALATION)
Status: COMPLETED | OUTPATIENT
Start: 2020-07-26 | End: 2020-07-26

## 2020-07-26 RX ORDER — PREDNISONE 20 MG/1
60 TABLET ORAL DAILY
Qty: 12 TAB | Refills: 0 | Status: SHIPPED | OUTPATIENT
Start: 2020-07-26 | End: 2020-07-30

## 2020-07-26 RX ORDER — ACETAMINOPHEN 325 MG/1
650 TABLET ORAL ONCE
Status: DISCONTINUED | OUTPATIENT
Start: 2020-07-26 | End: 2020-07-26 | Stop reason: HOSPADM

## 2020-07-26 RX ADMIN — IPRATROPIUM BROMIDE AND ALBUTEROL SULFATE 3 ML: .5; 3 SOLUTION RESPIRATORY (INHALATION) at 06:15

## 2020-07-26 RX ADMIN — METHYLPREDNISOLONE SODIUM SUCCINATE 125 MG: 125 INJECTION, POWDER, FOR SOLUTION INTRAMUSCULAR; INTRAVENOUS at 03:03

## 2020-07-26 RX ADMIN — IPRATROPIUM BROMIDE AND ALBUTEROL SULFATE 3 ML: .5; 3 SOLUTION RESPIRATORY (INHALATION) at 01:07

## 2020-07-26 NOTE — ED NOTES
Report given to Shaneka Jimenes RN. They were informed of patient chief complaint, current status, orders completed (to include IV access/medications/radiology testing), outstanding orders that still need to be completed, and the treatment plan. Ensured no questions or concerns regarding the patient prior to departure. New RNs aware patient is waiting for [facility], informed that all paperwork has been completed and patient is ready.

## 2020-07-26 NOTE — DISCHARGE INSTRUCTIONS
Patient Education        Chronic Obstructive Pulmonary Disease (COPD): Care Instructions  Your Care Instructions     Chronic obstructive pulmonary disease (COPD) is a general term for a group of lung diseases, including emphysema and chronic bronchitis. People with COPD have decreased airflow in and out of the lungs, which makes it hard to breathe. The airways also can get clogged with thick mucus. Cigarette smoking is a major cause of COPD. Although there is no cure for COPD, you can slow its progress. Following your treatment plan and taking care of yourself can help you feel better and live longer. Follow-up care is a key part of your treatment and safety. Be sure to make and go to all appointments, and call your doctor if you are having problems. It's also a good idea to know your test results and keep a list of the medicines you take. How can you care for yourself at home? Staying healthy  · Do not smoke. This is the most important step you can take to prevent more damage to your lungs. If you need help quitting, talk to your doctor about stop-smoking programs and medicines. These can increase your chances of quitting for good. · Avoid colds and flu. Get a pneumococcal vaccine shot. If you have had one before, ask your doctor whether you need a second dose. Get the flu vaccine every fall. If you must be around people with colds or the flu, wash your hands often. · Avoid secondhand smoke, air pollution, and high altitudes. Also avoid cold, dry air and hot, humid air. Stay at home with your windows closed when air pollution is bad. Medicines and oxygen therapy  · Take your medicines exactly as prescribed. Call your doctor if you think you are having a problem with your medicine. You may be taking medicines such as:  ? Bronchodilators. These help open your airways and make breathing easier. They are either short-acting (work for 6 to 9 hours) or long-acting (work for 24 hours).  You inhale most bronchodilators, so they start to act quickly. Always carry your quick-relief inhaler with you in case you need it while you are away from home. ? Corticosteroids (prednisone, budesonide). These reduce airway inflammation. They come in pill or inhaled form. You must take these medicines every day for them to work well. · Ask your doctor or pharmacist if a spacer is right for you. A spacer may help you get more inhaled medicine to your lungs. If you use one, ask how to use it properly. · Do not take any vitamins, over-the-counter medicine, or herbal products without talking to your doctor first.  · If your doctor prescribed antibiotics, take them as directed. Do not stop taking them just because you feel better. You need to take the full course of antibiotics. · If you use oxygen therapy, use the flow rate your doctor has recommended. Don't change it without talking to your doctor first. Oxygen therapy boosts the amount of oxygen in your blood and helps you breathe easier. Activity  · Get regular exercise. Walking is an easy way to get exercise. Start out slowly, and walk a little more each day. · Pay attention to your breathing. You are exercising too hard if you can't talk while you exercise. · Take short rest breaks when doing household chores and other activities. · Learn breathing methods--such as breathing through pursed lips--to help you become less short of breath. · If your doctor has not set you up with a pulmonary rehabilitation program, ask if rehab is right for you. Rehab includes exercise programs, education about your disease and how to manage it, help with diet and other changes, and emotional support. Diet  · Eat regular, healthy meals. Use bronchodilators about 1 hour before you eat to make it easier to eat. Eat several small meals instead of three large ones. Drink beverages at the end of the meal. Avoid foods that are hard to chew.   · Eat foods that contain protein so you don't lose muscle mass. · Talk with your doctor if you gain too much weight or if you lose weight without trying. Mental health  · Talk to your family, friends, or a therapist about your feelings. Some people feel frightened, angry, hopeless, helpless, and even guilty. Talking openly about bad feelings can help you cope. If these feelings last, talk to your doctor. When should you call for help? [id number] anytime you think you may need emergency care. For example, call if:  · You have severe trouble breathing. Call your doctor now or seek immediate medical care if:  · You have new or worse trouble breathing. · You cough up blood. · You have a fever. Watch closely for changes in your health, and be sure to contact your doctor if:  · You cough more deeply or more often, especially if you notice more mucus or a change in the color of your mucus. · You have new or worse swelling in your legs or belly. · You are not getting better as expected. Where can you learn more? Go to http://cassia-donte.info/  Enter E366 in the search box to learn more about \"Chronic Obstructive Pulmonary Disease (COPD): Care Instructions. \"  Current as of: February 24, 2020               Content Version: 12.5  © 2006-2020 [employer]. Care instructions adapted under license by [employer] (which disclaims liability or warranty for this information). If you have questions about a medical condition or this instruction, always ask your healthcare professional. [address] any warranty or liability for your use of this information.

## 2020-07-26 NOTE — ED PROVIDER NOTES
EMERGENCY DEPARTMENT HISTORY AND PHYSICAL EXAM      Date: 7/25/2020  Patient Name: Kiki Mejia    History of Presenting Illness     Chief Complaint   Patient presents with    Shortness of Breath     Patient reports COPD exacerbation x1 week, states she's been sleeping propped up on pillows because she can no longer breathe laying flat. Patient wears O2 at home nasal cannula, states NC isn't enough and NRB is too much. Reports she is compliant with meds. History Provided By: Patient    HPI: Kiki Mejia, 52 y.o. female with PMHx significant for morbid obesity, COPD, CHF, on 5 L of home oxygen, hypertension, diabetes, who presents with a chief complaint of inability to tolerate her home oxygen. Patient reports that she is \"unable to tolerate\" her home 5 L and is also having difficulty tolerating her home BiPAP. She reports increasing fatigue and shortness of breath for last week with associated chest burning. She reports she has been using her breathing treatments as prescribed. No abdominal pain, nausea, vomiting      PCP: Kaitlynn Johnston MD    There are no other complaints, changes, or physical findings at this time. Current Facility-Administered Medications   Medication Dose Route Frequency Provider Last Rate Last Dose    acetaminophen (TYLENOL) tablet 650 mg  650 mg Oral ONCE Grant Nagel MD   Stopped at 07/26/20 3159     Current Outpatient Medications   Medication Sig Dispense Refill    ammonium lactate (LAC-HYDRIN) 12 % lotion Apply 1 Applicator to affected area as needed. rub in to affected area well      omeprazole (PRILOSEC) 40 mg capsule Take 40 mg by mouth daily.  potassium chloride SR (KLOR-CON 10) 10 mEq tablet Take 10 mEq by mouth four (4) times daily.  nystatin (MYCOSTATIN) topical cream Apply  to affected area two (2) times a day. 15 g 0    cyclobenzaprine (FLEXERIL) 5 mg tablet Take 1 Tab by mouth two (2) times daily as needed for Muscle Spasm(s). flexeriol 5 Tab 0    albuterol-ipratropium (DUO-NEB) 2.5 mg-0.5 mg/3 ml nebu 3 mL by Nebulization route four (4) times daily. 100 Nebule 1    furosemide (LASIX) 40 mg tablet Take 40 mg by mouth two (2) times a day.  fluticasone (FLONASE) 50 mcg/actuation nasal spray 2 Sprays by Both Nostrils route daily. 1 Bottle 0    insulin glargine (LANTUS) 100 unit/mL injection 35 Units by SubCUTAneous route daily.  metoprolol tartrate (LOPRESSOR) 25 mg tablet Take 25 mg by mouth two (2) times a day.  fluticasone-salmeterol (ADVAIR DISKUS) 500-50 mcg/dose diskus inhaler Take 1 Puff by inhalation daily.  loratadine (CLARITIN) 10 mg tablet Take 10 mg by mouth daily.  albuterol (VENTOLIN HFA) 90 mcg/actuation inhaler Take 2 Puffs by inhalation every six (6) hours as needed for Wheezing.  aspirin 81 mg chewable tablet Take 81 mg by mouth daily.  lovastatin (MEVACOR) 40 mg tablet Take 1 Tab by mouth nightly. 30 Tab 6    glyBURIDE (DIABETA) 5 mg tablet Take 5 mg by mouth two (2) times daily (with meals).  nitroglycerin (NITROSTAT) 0.4 mg SL tablet 1 Tab by SubLINGual route every five (5) minutes as needed for Chest Pain (call 911 if not relieved by 3).  25 Tab 2     Past History     Past Medical History:  Past Medical History:   Diagnosis Date    Arthritis     Asthma     Breast lump     CAD (coronary artery disease)     Cancer (HCC)     breast cancer    Congestive heart failure (HCC)     COPD (chronic obstructive pulmonary disease) (HCC)     Diabetes [facility]     Hypertension     Morbid obesity with BMI of 70 and over, adult ([facility]     NSTEMI (non-ST elevated myocardial infarction) ([facility]     SVT (supraventricular tachycardia) ([facility]      Past Surgical History:  Past Surgical History:   Procedure Laterality Date    CARDIAC SURG PROCEDURE UNLIST      Stents    HX  SECTION      HX ORTHOPAEDIC      HX OTHER SURGICAL      cyst removed from back     Family History:  Family History   Problem Relation Age of Onset    Heart Disease Mother     Heart Disease Father     Heart Disease Sister     Breast Cancer Maternal Grandmother     Breast Cancer Paternal Grandmother      Social History:  Social History     Tobacco Use    Smoking status: Former Smoker     Packs/day: 0.25     Types: Cigarettes    Smokeless tobacco: Never Used    Tobacco comment: Patient states \"I  aint smoking no more d*mn cigarettes after yesterday\" 01/26/2018   Substance Use Topics    Alcohol use: No    Drug use: No     Allergies:  No Known Allergies  Review of Systems   Review of Systems   Constitutional: Negative for chills and fever. HENT: Negative for congestion, rhinorrhea and sore throat. Respiratory: Positive for shortness of breath. Negative for cough. Cardiovascular: Negative for chest pain. Gastrointestinal: Negative for abdominal pain, nausea and vomiting. Genitourinary: Negative for dysuria and urgency. Skin: Negative for rash. Neurological: Negative for dizziness, light-headedness and headaches. All other systems reviewed and are negative. Physical Exam   Physical Exam  Vitals signs and nursing note reviewed. Constitutional:       General: She is not in acute distress. Appearance: She is well-developed. She is morbidly obese. HENT:      Head: Normocephalic and atraumatic. Eyes:      Conjunctiva/sclera: Conjunctivae normal.      Pupils: Pupils are equal, round, and reactive to light. Neck:      Musculoskeletal: Normal range of motion. Cardiovascular:      Rate and Rhythm: Normal rate and regular rhythm. Pulmonary:      Effort: Pulmonary effort is normal. No respiratory distress. Breath sounds: No stridor. Comments: Diffusely diminished, speaks in full and complete sentences without dyspnea  Abdominal:      General: There is no distension. Palpations: Abdomen is soft. Tenderness: There is no abdominal tenderness.    Musculoskeletal: Normal range of motion. Skin:     General: Skin is warm and dry. Comments: Chronic venous stasis changes in the bilateral lower extremities   Neurological:      Mental Status: She is alert and oriented to person, place, and time. Diagnostic Study Results   Labs -     Recent Results (from the past 12 hour(s))   EKG, 12 LEAD, INITIAL    Collection Time: 07/25/20 10:14 PM   Result Value Ref Range    Ventricular Rate 80 BPM    Atrial Rate 80 BPM    P-R Interval 188 ms    QRS Duration 78 ms    Q-T Interval 374 ms    QTC Calculation (Bezet) 431 ms    Calculated P Axis 41 degrees    Calculated R Axis 105 degrees    Calculated T Axis 35 degrees    Diagnosis       Normal sinus rhythm  Rightward axis  When compared with ECG of 06-DEC-2019 17:39,  premature supraventricular complexes are no longer present     CBC WITH AUTOMATED DIFF    Collection Time: 07/25/20 10:33 PM   Result Value Ref Range    WBC 8.5 3.6 - 11.0 K/uL    RBC 4.47 3.80 - 5.20 M/uL    HGB 12.5 11.5 - 16.0 g/dL    HCT 43.3 35.0 - 47.0 %    MCV 96.9 80.0 - 99.0 FL    MCH 28.0 26.0 - 34.0 PG    MCHC 28.9 (L) 30.0 - 36.5 g/dL    RDW 13.2 11.5 - 14.5 %    PLATELET 331 683 - 576 K/uL    MPV 11.1 8.9 - 12.9 FL    NRBC 0.0 0  WBC    ABSOLUTE NRBC 0.00 0.00 - 0.01 K/uL    NEUTROPHILS 77 (H) 32 - 75 %    LYMPHOCYTES 12 12 - 49 %    MONOCYTES 8 5 - 13 %    EOSINOPHILS 2 0 - 7 %    BASOPHILS 0 0 - 1 %    IMMATURE GRANULOCYTES 1 (H) 0.0 - 0.5 %    ABS. NEUTROPHILS 6.5 1.8 - 8.0 K/UL    ABS. LYMPHOCYTES 1.0 0.8 - 3.5 K/UL    ABS. MONOCYTES 0.7 0.0 - 1.0 K/UL    ABS. EOSINOPHILS 0.2 0.0 - 0.4 K/UL    ABS. BASOPHILS 0.0 0.0 - 0.1 K/UL    ABS. IMM.  GRANS. 0.1 (H) 0.00 - 0.04 K/UL    DF SMEAR SCANNED      RBC COMMENTS NORMOCYTIC, NORMOCHROMIC     METABOLIC PANEL, COMPREHENSIVE    Collection Time: 07/25/20 10:33 PM   Result Value Ref Range    Sodium 141 136 - 145 mmol/L    Potassium 4.2 3.5 - 5.1 mmol/L    Chloride 103 97 - 108 mmol/L    CO2 35 (H) 21 - 32 mmol/L Anion gap 3 (L) 5 - 15 mmol/L    Glucose 106 (H) 65 - 100 mg/dL    BUN 18 6 - 20 MG/DL    Creatinine 0.71 0.55 - 1.02 MG/DL    BUN/Creatinine ratio 25 (H) 12 - 20      GFR est AA >60 >60 ml/min/1.73m2    GFR est non-AA >60 >60 ml/min/1.73m2    Calcium 10.0 8.5 - 10.1 MG/DL    Bilirubin, total 0.3 0.2 - 1.0 MG/DL    ALT (SGPT) 20 12 - 78 U/L    AST (SGOT) 17 15 - 37 U/L    Alk. phosphatase 92 45 - 117 U/L    Protein, total 8.0 6.4 - 8.2 g/dL    Albumin 3.2 (L) 3.5 - 5.0 g/dL    Globulin 4.8 (H) 2.0 - 4.0 g/dL    A-G Ratio 0.7 (L) 1.1 - 2.2     SAMPLES BEING HELD    Collection Time: 07/25/20 10:33 PM   Result Value Ref Range    SAMPLES BEING HELD BLUE TUBE     COMMENT        Add-on orders for these samples will be processed based on acceptable specimen integrity and analyte stability, which may vary by analyte. TROPONIN I    Collection Time: 07/25/20 10:33 PM   Result Value Ref Range    Troponin-I, Qt. <0.05 <0.05 ng/mL   POC EG7    Collection Time: 07/25/20 11:55 PM   Result Value Ref Range    Calcium, ionized (POC) 1.37 (H) 1.12 - 1.32 mmol/L    FIO2 (POC) 100 %    pH (POC) 7.31 (L) 7.35 - 7.45      pCO2 (POC) 76.8 (H) 35.0 - 45.0 MMHG    pO2 (POC) 55 (L) 80 - 100 MMHG    HCO3 (POC) 38.9 (H) 22 - 26 MMOL/L    Base excess (POC) 13 mmol/L    sO2 (POC) 83 (L) 92 - 97 %    Site OTHER      Device: Non rebreather      Allens test (POC) N/A      Specimen type (POC) VENOUS BLOOD      Total resp. rate 20     POC EG7    Collection Time: 07/26/20  2:40 AM   Result Value Ref Range    Calcium, ionized (POC) 1.44 (H) 1.12 - 1.32 mmol/L    pH (POC) 7.29 (L) 7.35 - 7.45      pCO2 (POC) 84.4 (H) 35.0 - 45.0 MMHG    pO2 (POC) 24 (LL) 80 - 100 MMHG    HCO3 (POC) 40.5 (H) 22 - 26 MMOL/L    Base excess (POC) 14 mmol/L    sO2 (POC) 32 (L) 92 - 97 %    Site OTHER      Device: NASAL CANNULA      Flow rate (POC) 4 L/M    Allens test (POC) N/A      Specimen type (POC) VENOUS BLOOD      Total resp.  rate 20         Radiologic Studies -   XR CHEST PORT   Final Result   IMPRESSION: Minimal patchy bibasilar opacification. Xr Chest Port    Result Date: 7/26/2020  IMPRESSION: Minimal patchy bibasilar opacification. Medical Decision Making   I am the first provider for this patient. I reviewed the vital signs, available nursing notes, past medical history, past surgical history, family history and social history. Vital Signs-Reviewed the patient's vital signs. Patient Vitals for the past 12 hrs:   Temp Pulse Resp BP SpO2   07/26/20 0418     95 %   07/26/20 0257     95 %   07/26/20 0022     93 %   07/25/20 2300  79 16 113/47 98 %   07/25/20 2230  83 22 108/61 97 %   07/25/20 2215  83 20 130/72 98 %   07/25/20 2209 97.9 °F (36.6 °C) 87 15 122/66 99 %       Pulse Oximetry Analysis - 93% on 4L    Cardiac Monitor:   Rate: 83 bpm  Rhythm: Normal Sinus Rhythm      ED EKG interpretation:  Rhythm: normal sinus rhythm; and regular . Rate (approx.): 80; Axis: right axis deviation; P wave: normal; QRS interval: normal ; ST/T wave: normal; Other findings: borderline ekg. This EKG was interpreted by ESTIVEN Benoit MD,ED Provider. Records Reviewed: Nursing Notes and Old Medical Records    Provider Notes (Medical Decision Making):   Patient presents with a chief complaint of breath. On my evaluation she is able to speak in full and complete sentences. She does not appear dyspneic. And is satting normally on her home oxygen. Will check basic lab work, chest x-ray, troponin, EKG. Patient is concerned that her CO2 is elevated. Suspect she has a chronically elevated CO2 and patient is not having any signs or symptoms concerning for hypercarbia, however will check VBG. ED Course:   Initial assessment performed. The patients presenting problems have been discussed, and they are in agreement with the care plan formulated and outlined with them. I have encouraged them to ask questions as they arise throughout their visit.     Repeat blood gas slightly worsened from initial.  I suspect this is related to the patient's nonrebreather prehospital as well as her lack of compliance with BiPAP. Also at this point it is the middle of the night and patient should be on her home BiPAP. Patient with no signs or symptoms of hypercarbia, however will place on her nighttime BiPAP and reevaluate in the morning. Given that the patient is overall well-appearing, not tachypneic, not altered, do not feel she will require admission. ED Course as of Jul 27 0932   Sun Jul 26, 2020   0600 After her normal nighttime BiPAP. Patient's blood gas improved. Again, on my evaluation patient never had any symptoms consistent with significant hypercarbia. I suspect some of her hypercarbia was related to the nonrebreather mask that she was placed on by EMS as well as her noncompliance with BiPAP at home. I do not feel the patient requires admission at the hospital at this time    [PATTI]      ED Course User Index  Belen Robbins MD       Procedures:  Procedures    Critical Care:  none    Disposition:  Discharge Note:  6:35 AM  The patient has been re-evaluated and is ready for discharge. Reviewed available results with patient. Counseled patient on diagnosis and care plan. Patient has expressed understanding, and all questions have been answered. Patient agrees with plan and agrees to follow up as recommended, or to return to the ED if their symptoms worsen. Discharge instructions have been provided and explained to the patient, along with reasons to return to the ED. PLAN:  1. Discharge Medication List as of 7/26/2020  6:36 AM      START taking these medications    Details   predniSONE (DELTASONE) 20 mg tablet Take 60 mg by mouth daily for 4 days. , Normal, Disp-12 Tab,R-0         CONTINUE these medications which have NOT CHANGED    Details   ammonium lactate (LAC-HYDRIN) 12 % lotion Apply 1 Applicator to affected area as needed.  rub in to affected area well, Historical Med      omeprazole (PRILOSEC) 40 mg capsule Take 40 mg by mouth daily. , Historical Med      potassium chloride SR (KLOR-CON 10) 10 mEq tablet Take 10 mEq by mouth four (4) times daily. , Historical Med      nystatin (MYCOSTATIN) topical cream Apply  to affected area two (2) times a day., Normal, Disp-15 g, R-0      cyclobenzaprine (FLEXERIL) 5 mg tablet Take 1 Tab by mouth two (2) times daily as needed for Muscle Spasm(s). flexeriol, Print, Disp-5 Tab, R-0      albuterol-ipratropium (DUO-NEB) 2.5 mg-0.5 mg/3 ml nebu 3 mL by Nebulization route four (4) times daily. , Normal, Disp-100 Nebule, R-1      furosemide (LASIX) 40 mg tablet Take 40 mg by mouth two (2) times a day., Historical Med      fluticasone (FLONASE) 50 mcg/actuation nasal spray 2 Sprays by Both Nostrils route daily. , Print, Disp-1 Bottle, R-0      insulin glargine (LANTUS) 100 unit/mL injection 35 Units by SubCUTAneous route daily. , Historical Med      metoprolol tartrate (LOPRESSOR) 25 mg tablet Take 25 mg by mouth two (2) times a day., Historical Med      fluticasone-salmeterol (ADVAIR DISKUS) 500-50 mcg/dose diskus inhaler Take 1 Puff by inhalation daily. , Historical Med      loratadine (CLARITIN) 10 mg tablet Take 10 mg by mouth daily. , Historical Med      albuterol (VENTOLIN HFA) 90 mcg/actuation inhaler Take 2 Puffs by inhalation every six (6) hours as needed for Wheezing., Historical Med      aspirin 81 mg chewable tablet Take 81 mg by mouth daily. , Historical Med      lovastatin (MEVACOR) 40 mg tablet Take 1 Tab by mouth nightly., Normal, Disp-30 Tab, R-6      glyBURIDE (DIABETA) 5 mg tablet Take 5 mg by mouth two (2) times daily (with meals). , Historical Med      nitroglycerin (NITROSTAT) 0.4 mg SL tablet 1 Tab by SubLINGual route every five (5) minutes as needed for Chest Pain (call 911 if not relieved by 3). , Normal, Disp-25 Tab, R-2           2.    Follow-up Information     Follow up With Specialties Details Why Contact Dayron Rice MD General Practice Schedule an appointment as soon as possible for a visit  [address]  [phone]      [facility] EMERGENCY DEPT Emergency Medicine  As needed, If symptoms worsen [address]  [address] [facility]  [phone]        Return to ED if worse     Diagnosis     Clinical Impression:   1. COPD exacerbation ([address]        This note will not be viewable in [address] Please note that this dictation was completed with [employer], the [employer] voice recognition software. Quite often unanticipated grammatical, syntax, homophones, and other interpretive errors are inadvertently transcribed by the computer software. Please disregard these errors.   Please excuse any errors that have escaped final proofreading

## 2020-07-26 NOTE — ED NOTES
Robe KIM reviewed discharge instructions with the patient. The patient verbalized understanding. All questions and concerns were addressed. Patient to be discharged via [facility], ETA 0800.

## 2020-07-26 NOTE — ED NOTES
Assumed care of patient. Patient in no apparent distress. Call bell within reach. Waiting for [facility] to arrive ETA 0800.

## 2020-07-27 ENCOUNTER — PATIENT OUTREACH (OUTPATIENT)
Dept: CASE MANAGEMENT | Age: 50
End: 2020-07-27

## 2020-07-27 NOTE — PROGRESS NOTES
Patient contacted regarding recent discharge and COVID-19 risk. Discussed COVID-19 related testing which was not done at this time. Ambulatory Care Manager contacted the patient by telephone to perform post discharge assessment. Verified name and  with patient as identifiers. Patient has following risk factors of: COPD, asthma, diabetes and ED visit 20. ACM reviewed discharge instructions, medical action plan and red flags related to discharge diagnosis. Reviewed and educated them on any new and changed medications related to discharge diagnosis. Patient denied questions or concerns regarding discharge instructions or medications. Education provided regarding infection prevention, and signs and symptoms of COVID-19 and when to seek medical attention with patient who verbalized understanding. Discussed exposure protocols and quarantine from [address] [place], [place] [address]y you at higher risk for severe illness  and given an opportunity for questions and concerns. The patient was supplied the COVID-19 hotline [phone] and [facility] hotline [phone]. ACM recommended patient follow up with PCP and provided ACM contact information for future reference. From CDC: Are you at higher risk for severe illness?  Wash your hands often and avoid touching eyes, nose and mouth.  Avoid close contact (6 feet, which is about two arm lengths) with people who are sick.  Put distance between yourself and other people if COVID-19 is spreading in your community.  Clean and disinfect frequently touched surfaces.  Avoid all cruise travel and non-essential air travel.  Call your healthcare professional if you have concerns about COVID-19 and your underlying condition or if you are sick.     For more information on steps you can take to protect yourself, see CDC's How to Protect Yourself      Patient/family/caregiver given information for [employer] and agrees to enroll no    Plan for follow-up call in 7-14 days based on severity of symptoms and risk factors.     Herb Rock RN  Ambulatory Care Manager

## 2020-07-29 ENCOUNTER — TELEPHONE (OUTPATIENT)
Dept: SLEEP MEDICINE | Age: 50
End: 2020-07-29

## 2020-07-29 NOTE — TELEPHONE ENCOUNTER
Ju chavarria from [employer] called into the office to request an order to discontinue pap therapy and return device to DME. Patient is not compliant. (f) [phone].

## 2020-08-11 ENCOUNTER — PATIENT OUTREACH (OUTPATIENT)
Dept: CASE MANAGEMENT | Age: 50
End: 2020-08-11

## 2020-08-11 NOTE — PROGRESS NOTES
Patient resolved from Transition of Care episode on 8/11/20  Discussed COVID-19 related testing which was not available in chart at this time. Patient states test results were negative. Patient informed of results, if available? Patient was aware and informed writer of negative results    Patient/family has been provided the following resources and education related to COVID-19:                         Signs, symptoms and red flags related to COVID-19            CDC exposure and quarantine guidelines            [employer] exposure contact - [phone]            Contact for their local Department of Health                 Patient currently reports that the following symptoms have improved:  no new symptoms and no worsening symptoms. Patient does report having issue with O2 tubing and feels like the left side is stopped up. ACM suggested patient contact O2 supply company for check on equipment and follow up with PCP. Patient verbalized understanding. No further outreach scheduled with this ACM. Episode of Care resolved. Patient has this ACM contact information if future needs arise.   Erwin Carbone, NORMA  Ambulatory Care Manager

## 2020-08-17 ENCOUNTER — APPOINTMENT (OUTPATIENT)
Dept: GENERAL RADIOLOGY | Age: 50
DRG: 190 | End: 2020-08-17
Attending: EMERGENCY MEDICINE
Payer: MEDICARE

## 2020-08-17 ENCOUNTER — HOSPITAL ENCOUNTER (INPATIENT)
Age: 50
LOS: 3 days | Discharge: HOME HEALTH | DRG: 190 | End: 2020-08-20
Attending: EMERGENCY MEDICINE | Admitting: INTERNAL MEDICINE
Payer: MEDICARE

## 2020-08-17 DIAGNOSIS — E66.01 MORBID OBESITY (HCC): ICD-10-CM

## 2020-08-17 DIAGNOSIS — I89.0 LYMPHEDEMA OF LEFT LOWER EXTREMITY: ICD-10-CM

## 2020-08-17 DIAGNOSIS — J44.1 ACUTE EXACERBATION OF CHRONIC OBSTRUCTIVE PULMONARY DISEASE (COPD) (HCC): Primary | ICD-10-CM

## 2020-08-17 DIAGNOSIS — J96.12 HYPERCAPNIC RESPIRATORY FAILURE, CHRONIC (HCC): ICD-10-CM

## 2020-08-17 LAB
ALBUMIN SERPL-MCNC: 3.5 G/DL (ref 3.5–5)
ALBUMIN/GLOB SERPL: 0.7 {RATIO} (ref 1.1–2.2)
ALP SERPL-CCNC: 102 U/L (ref 45–117)
ALT SERPL-CCNC: 18 U/L (ref 12–78)
AMPHET UR QL SCN: NEGATIVE
ANION GAP SERPL CALC-SCNC: 0 MMOL/L (ref 5–15)
APPEARANCE UR: CLEAR
ARTERIAL PATENCY WRIST A: ABNORMAL
AST SERPL-CCNC: 12 U/L (ref 15–37)
ATRIAL RATE: 78 BPM
BACTERIA URNS QL MICRO: NEGATIVE /HPF
BARBITURATES UR QL SCN: NEGATIVE
BASE EXCESS BLD CALC-SCNC: 11 MMOL/L
BASE EXCESS BLD CALC-SCNC: 9 MMOL/L
BASE EXCESS BLD CALC-SCNC: 9 MMOL/L
BASOPHILS # BLD: 0 K/UL (ref 0–0.1)
BASOPHILS NFR BLD: 0 % (ref 0–1)
BDY SITE: ABNORMAL
BENZODIAZ UR QL: NEGATIVE
BILIRUB SERPL-MCNC: 0.3 MG/DL (ref 0.2–1)
BILIRUB UR QL: NEGATIVE
BNP SERPL-MCNC: 317 PG/ML
BUN SERPL-MCNC: 19 MG/DL (ref 6–20)
BUN/CREAT SERPL: 27 (ref 12–20)
CA-I BLD-SCNC: 1.42 MMOL/L (ref 1.12–1.32)
CA-I BLD-SCNC: 1.43 MMOL/L (ref 1.12–1.32)
CA-I BLD-SCNC: 1.45 MMOL/L (ref 1.12–1.32)
CALCIUM SERPL-MCNC: 10.2 MG/DL (ref 8.5–10.1)
CALCULATED P AXIS, ECG09: 31 DEGREES
CALCULATED R AXIS, ECG10: 68 DEGREES
CALCULATED T AXIS, ECG11: 46 DEGREES
CANNABINOIDS UR QL SCN: NEGATIVE
CHLORIDE SERPL-SCNC: 103 MMOL/L (ref 97–108)
CK MB CFR SERPL CALC: 5.4 % (ref 0–2.5)
CK MB SERPL-MCNC: 1.5 NG/ML (ref 5–25)
CK SERPL-CCNC: 28 U/L (ref 26–192)
CO2 SERPL-SCNC: 35 MMOL/L (ref 21–32)
COCAINE UR QL SCN: NEGATIVE
COLOR UR: ABNORMAL
COVID-19 RAPID TEST, COVR: NOT DETECTED
CREAT SERPL-MCNC: 0.71 MG/DL (ref 0.55–1.02)
DIAGNOSIS, 93000: NORMAL
DIFFERENTIAL METHOD BLD: ABNORMAL
DRUG SCRN COMMENT,DRGCM: NORMAL
EOSINOPHIL # BLD: 0 K/UL (ref 0–0.4)
EOSINOPHIL NFR BLD: 0 % (ref 0–7)
EPITH CASTS URNS QL MICRO: ABNORMAL /LPF
ERYTHROCYTE [DISTWIDTH] IN BLOOD BY AUTOMATED COUNT: 13.2 % (ref 11.5–14.5)
GAS FLOW.O2 O2 DELIVERY SYS: ABNORMAL L/MIN
GAS FLOW.O2 SETTING OXYMISER: 4 L/M
GLOBULIN SER CALC-MCNC: 4.9 G/DL (ref 2–4)
GLUCOSE BLD STRIP.AUTO-MCNC: 147 MG/DL (ref 65–100)
GLUCOSE BLD STRIP.AUTO-MCNC: 148 MG/DL (ref 65–100)
GLUCOSE BLD STRIP.AUTO-MCNC: 195 MG/DL (ref 65–100)
GLUCOSE BLD STRIP.AUTO-MCNC: 269 MG/DL (ref 65–100)
GLUCOSE BLD STRIP.AUTO-MCNC: 289 MG/DL (ref 65–100)
GLUCOSE SERPL-MCNC: 233 MG/DL (ref 65–100)
GLUCOSE UR STRIP.AUTO-MCNC: NEGATIVE MG/DL
HCO3 BLD-SCNC: 35.9 MMOL/L (ref 22–26)
HCO3 BLD-SCNC: 36 MMOL/L (ref 22–26)
HCO3 BLD-SCNC: 37.7 MMOL/L (ref 22–26)
HCT VFR BLD AUTO: 46.7 % (ref 35–47)
HEALTH STATUS, XMCV2T: NORMAL
HGB BLD-MCNC: 13.6 G/DL (ref 11.5–16)
HGB UR QL STRIP: ABNORMAL
HYALINE CASTS URNS QL MICRO: ABNORMAL /LPF (ref 0–5)
IMM GRANULOCYTES # BLD AUTO: 0 K/UL (ref 0–0.04)
IMM GRANULOCYTES NFR BLD AUTO: 0 % (ref 0–0.5)
KETONES UR QL STRIP.AUTO: NEGATIVE MG/DL
LEUKOCYTE ESTERASE UR QL STRIP.AUTO: NEGATIVE
LYMPHOCYTES # BLD: 0.5 K/UL (ref 0.8–3.5)
LYMPHOCYTES NFR BLD: 4 % (ref 12–49)
MCH RBC QN AUTO: 28 PG (ref 26–34)
MCHC RBC AUTO-ENTMCNC: 29.1 G/DL (ref 30–36.5)
MCV RBC AUTO: 96.3 FL (ref 80–99)
METHADONE UR QL: NEGATIVE
MONOCYTES # BLD: 0.1 K/UL (ref 0–1)
MONOCYTES NFR BLD: 1 % (ref 5–13)
MUCOUS THREADS URNS QL MICRO: ABNORMAL /LPF
NEUTS SEG # BLD: 10.8 K/UL (ref 1.8–8)
NEUTS SEG NFR BLD: 95 % (ref 32–75)
NITRITE UR QL STRIP.AUTO: NEGATIVE
NRBC # BLD: 0 K/UL (ref 0–0.01)
NRBC BLD-RTO: 0 PER 100 WBC
O2/TOTAL GAS SETTING VFR VENT: 100 %
O2/TOTAL GAS SETTING VFR VENT: 100 %
OPIATES UR QL: NEGATIVE
P-R INTERVAL, ECG05: 188 MS
PCO2 BLD: 74.5 MMHG (ref 35–45)
PCO2 BLD: 77 MMHG (ref 35–45)
PCO2 BLD: 82.3 MMHG (ref 35–45)
PCP UR QL: NEGATIVE
PEEP RESPIRATORY: 12 CMH2O
PEEP RESPIRATORY: 12 CMH2O
PH BLD: 7.27 [PH] (ref 7.35–7.45)
PH BLD: 7.28 [PH] (ref 7.35–7.45)
PH BLD: 7.29 [PH] (ref 7.35–7.45)
PH UR STRIP: 5.5 [PH] (ref 5–8)
PIP ISTAT,IPIP: 21
PLATELET # BLD AUTO: 181 K/UL (ref 150–400)
PMV BLD AUTO: 11 FL (ref 8.9–12.9)
PO2 BLD: 41 MMHG (ref 80–100)
PO2 BLD: 44 MMHG (ref 80–100)
PO2 BLD: 89 MMHG (ref 80–100)
POTASSIUM SERPL-SCNC: 4.5 MMOL/L (ref 3.5–5.1)
PROT SERPL-MCNC: 8.4 G/DL (ref 6.4–8.2)
PROT UR STRIP-MCNC: NEGATIVE MG/DL
Q-T INTERVAL, ECG07: 370 MS
QRS DURATION, ECG06: 88 MS
QTC CALCULATION (BEZET), ECG08: 421 MS
RBC # BLD AUTO: 4.85 M/UL (ref 3.8–5.2)
RBC #/AREA URNS HPF: ABNORMAL /HPF (ref 0–5)
RBC MORPH BLD: ABNORMAL
SAO2 % BLD: 67 % (ref 92–97)
SAO2 % BLD: 70 % (ref 92–97)
SAO2 % BLD: 95 % (ref 92–97)
SERVICE CMNT-IMP: ABNORMAL
SODIUM SERPL-SCNC: 138 MMOL/L (ref 136–145)
SOURCE, COVRS: NORMAL
SP GR UR REFRACTOMETRY: 1.02 (ref 1–1.03)
SPECIMEN SOURCE, FCOV2M: NORMAL
SPECIMEN TYPE, XMCV1T: NORMAL
SPECIMEN TYPE: ABNORMAL
TOTAL RESP. RATE, ITRR: 20
TOTAL RESP. RATE, ITRR: 22
TOTAL RESP. RATE, ITRR: 34
TROPONIN I SERPL-MCNC: <0.05 NG/ML
UA: UC IF INDICATED,UAUC: ABNORMAL
UROBILINOGEN UR QL STRIP.AUTO: 1 EU/DL (ref 0.2–1)
VENTRICULAR RATE, ECG03: 78 BPM
WBC # BLD AUTO: 11.4 K/UL (ref 3.6–11)
WBC URNS QL MICRO: ABNORMAL /HPF (ref 0–4)

## 2020-08-17 PROCEDURE — 80307 DRUG TEST PRSMV CHEM ANLYZR: CPT

## 2020-08-17 PROCEDURE — 99285 EMERGENCY DEPT VISIT HI MDM: CPT

## 2020-08-17 PROCEDURE — 94640 AIRWAY INHALATION TREATMENT: CPT

## 2020-08-17 PROCEDURE — 77010033678 HC OXYGEN DAILY

## 2020-08-17 PROCEDURE — 71045 X-RAY EXAM CHEST 1 VIEW: CPT

## 2020-08-17 PROCEDURE — 87635 SARS-COV-2 COVID-19 AMP PRB: CPT

## 2020-08-17 PROCEDURE — 74011636637 HC RX REV CODE- 636/637: Performed by: INTERNAL MEDICINE

## 2020-08-17 PROCEDURE — 85025 COMPLETE CBC W/AUTO DIFF WBC: CPT

## 2020-08-17 PROCEDURE — 36415 COLL VENOUS BLD VENIPUNCTURE: CPT

## 2020-08-17 PROCEDURE — 74011250636 HC RX REV CODE- 250/636: Performed by: INTERNAL MEDICINE

## 2020-08-17 PROCEDURE — 82550 ASSAY OF CK (CPK): CPT

## 2020-08-17 PROCEDURE — 96374 THER/PROPH/DIAG INJ IV PUSH: CPT

## 2020-08-17 PROCEDURE — 74011000250 HC RX REV CODE- 250: Performed by: INTERNAL MEDICINE

## 2020-08-17 PROCEDURE — 5A09457 ASSISTANCE WITH RESPIRATORY VENTILATION, 24-96 CONSECUTIVE HOURS, CONTINUOUS POSITIVE AIRWAY PRESSURE: ICD-10-PCS | Performed by: EMERGENCY MEDICINE

## 2020-08-17 PROCEDURE — 81001 URINALYSIS AUTO W/SCOPE: CPT

## 2020-08-17 PROCEDURE — 74011250637 HC RX REV CODE- 250/637: Performed by: INTERNAL MEDICINE

## 2020-08-17 PROCEDURE — 82803 BLOOD GASES ANY COMBINATION: CPT

## 2020-08-17 PROCEDURE — 82962 GLUCOSE BLOOD TEST: CPT

## 2020-08-17 PROCEDURE — 74011000250 HC RX REV CODE- 250: Performed by: EMERGENCY MEDICINE

## 2020-08-17 PROCEDURE — 80053 COMPREHEN METABOLIC PANEL: CPT

## 2020-08-17 PROCEDURE — 74011250636 HC RX REV CODE- 250/636: Performed by: EMERGENCY MEDICINE

## 2020-08-17 PROCEDURE — 94660 CPAP INITIATION&MGMT: CPT

## 2020-08-17 PROCEDURE — 84484 ASSAY OF TROPONIN QUANT: CPT

## 2020-08-17 PROCEDURE — 65660000001 HC RM ICU INTERMED STEPDOWN

## 2020-08-17 PROCEDURE — 83880 ASSAY OF NATRIURETIC PEPTIDE: CPT

## 2020-08-17 PROCEDURE — 93005 ELECTROCARDIOGRAM TRACING: CPT

## 2020-08-17 RX ORDER — GUAIFENESIN 100 MG/5ML
81 LIQUID (ML) ORAL DAILY
Status: DISCONTINUED | OUTPATIENT
Start: 2020-08-17 | End: 2020-08-20 | Stop reason: HOSPADM

## 2020-08-17 RX ORDER — NYSTATIN 100000 U/G
CREAM TOPICAL 2 TIMES DAILY
Status: DISCONTINUED | OUTPATIENT
Start: 2020-08-17 | End: 2020-08-20 | Stop reason: HOSPADM

## 2020-08-17 RX ORDER — FUROSEMIDE 40 MG/1
40 TABLET ORAL 2 TIMES DAILY
Status: DISCONTINUED | OUTPATIENT
Start: 2020-08-17 | End: 2020-08-19

## 2020-08-17 RX ORDER — POTASSIUM CHLORIDE 750 MG/1
10 TABLET, FILM COATED, EXTENDED RELEASE ORAL 4 TIMES DAILY
Status: DISCONTINUED | OUTPATIENT
Start: 2020-08-17 | End: 2020-08-17

## 2020-08-17 RX ORDER — INSULIN LISPRO 100 [IU]/ML
INJECTION, SOLUTION INTRAVENOUS; SUBCUTANEOUS EVERY 6 HOURS
Status: DISCONTINUED | OUTPATIENT
Start: 2020-08-17 | End: 2020-08-18

## 2020-08-17 RX ORDER — SODIUM CHLORIDE 0.9 % (FLUSH) 0.9 %
5-40 SYRINGE (ML) INJECTION AS NEEDED
Status: DISCONTINUED | OUTPATIENT
Start: 2020-08-17 | End: 2020-08-19 | Stop reason: SDUPTHER

## 2020-08-17 RX ORDER — SODIUM CHLORIDE 0.9 % (FLUSH) 0.9 %
5-40 SYRINGE (ML) INJECTION EVERY 8 HOURS
Status: DISCONTINUED | OUTPATIENT
Start: 2020-08-17 | End: 2020-08-19 | Stop reason: SDUPTHER

## 2020-08-17 RX ORDER — ARFORMOTEROL TARTRATE 15 UG/2ML
15 SOLUTION RESPIRATORY (INHALATION)
Status: DISCONTINUED | OUTPATIENT
Start: 2020-08-17 | End: 2020-08-20 | Stop reason: HOSPADM

## 2020-08-17 RX ORDER — SODIUM CHLORIDE 0.9 % (FLUSH) 0.9 %
5-40 SYRINGE (ML) INJECTION EVERY 8 HOURS
Status: DISCONTINUED | OUTPATIENT
Start: 2020-08-17 | End: 2020-08-20 | Stop reason: HOSPADM

## 2020-08-17 RX ORDER — PREDNISONE 20 MG/1
60 TABLET ORAL DAILY
Status: ON HOLD | COMMUNITY
Start: 2020-08-15 | End: 2020-08-20

## 2020-08-17 RX ORDER — PROMETHAZINE HYDROCHLORIDE 25 MG/1
12.5 TABLET ORAL
Status: DISCONTINUED | OUTPATIENT
Start: 2020-08-17 | End: 2020-08-20 | Stop reason: HOSPADM

## 2020-08-17 RX ORDER — BUDESONIDE 0.25 MG/2ML
250 INHALANT ORAL 2 TIMES DAILY
Status: DISCONTINUED | OUTPATIENT
Start: 2020-08-17 | End: 2020-08-17

## 2020-08-17 RX ORDER — IPRATROPIUM BROMIDE AND ALBUTEROL SULFATE 2.5; .5 MG/3ML; MG/3ML
3 SOLUTION RESPIRATORY (INHALATION)
Status: DISCONTINUED | OUTPATIENT
Start: 2020-08-17 | End: 2020-08-20 | Stop reason: HOSPADM

## 2020-08-17 RX ORDER — ATORVASTATIN CALCIUM 10 MG/1
10 TABLET, FILM COATED ORAL
Status: DISCONTINUED | OUTPATIENT
Start: 2020-08-17 | End: 2020-08-20 | Stop reason: HOSPADM

## 2020-08-17 RX ORDER — ACETAMINOPHEN 650 MG/1
650 SUPPOSITORY RECTAL
Status: DISCONTINUED | OUTPATIENT
Start: 2020-08-17 | End: 2020-08-20 | Stop reason: HOSPADM

## 2020-08-17 RX ORDER — FLUTICASONE FUROATE, UMECLIDINIUM BROMIDE AND VILANTEROL TRIFENATATE 100; 62.5; 25 UG/1; UG/1; UG/1
1 POWDER RESPIRATORY (INHALATION) DAILY
COMMUNITY

## 2020-08-17 RX ORDER — ONDANSETRON 2 MG/ML
4 INJECTION INTRAMUSCULAR; INTRAVENOUS
Status: DISCONTINUED | OUTPATIENT
Start: 2020-08-17 | End: 2020-08-20 | Stop reason: HOSPADM

## 2020-08-17 RX ORDER — PANTOPRAZOLE SODIUM 40 MG/1
40 TABLET, DELAYED RELEASE ORAL
Status: DISCONTINUED | OUTPATIENT
Start: 2020-08-17 | End: 2020-08-20 | Stop reason: HOSPADM

## 2020-08-17 RX ORDER — CYCLOBENZAPRINE HCL 5 MG
5 TABLET ORAL 2 TIMES DAILY
COMMUNITY

## 2020-08-17 RX ORDER — LORATADINE 10 MG/1
10 TABLET ORAL DAILY
Status: DISCONTINUED | OUTPATIENT
Start: 2020-08-17 | End: 2020-08-20 | Stop reason: HOSPADM

## 2020-08-17 RX ORDER — ALBUTEROL SULFATE 0.83 MG/ML
5 SOLUTION RESPIRATORY (INHALATION)
Status: COMPLETED | OUTPATIENT
Start: 2020-08-17 | End: 2020-08-17

## 2020-08-17 RX ORDER — FLUTICASONE PROPIONATE 50 MCG
2 SPRAY, SUSPENSION (ML) NASAL DAILY
Status: DISCONTINUED | OUTPATIENT
Start: 2020-08-17 | End: 2020-08-20 | Stop reason: HOSPADM

## 2020-08-17 RX ORDER — IPRATROPIUM BROMIDE AND ALBUTEROL SULFATE 2.5; .5 MG/3ML; MG/3ML
3 SOLUTION RESPIRATORY (INHALATION)
Status: COMPLETED | OUTPATIENT
Start: 2020-08-17 | End: 2020-08-17

## 2020-08-17 RX ORDER — POLYETHYLENE GLYCOL 3350 17 G/17G
17 POWDER, FOR SOLUTION ORAL DAILY PRN
Status: DISCONTINUED | OUTPATIENT
Start: 2020-08-17 | End: 2020-08-20 | Stop reason: HOSPADM

## 2020-08-17 RX ORDER — NITROGLYCERIN 0.4 MG/1
0.4 TABLET SUBLINGUAL
Status: DISCONTINUED | OUTPATIENT
Start: 2020-08-17 | End: 2020-08-20 | Stop reason: HOSPADM

## 2020-08-17 RX ORDER — ACETAMINOPHEN 325 MG/1
650 TABLET ORAL
Status: DISCONTINUED | OUTPATIENT
Start: 2020-08-17 | End: 2020-08-20 | Stop reason: HOSPADM

## 2020-08-17 RX ORDER — INSULIN ASPART 100 [IU]/ML
5-10 INJECTION, SOLUTION INTRAVENOUS; SUBCUTANEOUS AS DIRECTED
COMMUNITY
Start: 2020-06-11

## 2020-08-17 RX ORDER — MAGNESIUM SULFATE 100 %
4 CRYSTALS MISCELLANEOUS AS NEEDED
Status: DISCONTINUED | OUTPATIENT
Start: 2020-08-17 | End: 2020-08-20 | Stop reason: HOSPADM

## 2020-08-17 RX ORDER — INSULIN GLARGINE 100 [IU]/ML
20 INJECTION, SOLUTION SUBCUTANEOUS DAILY
Status: DISCONTINUED | OUTPATIENT
Start: 2020-08-17 | End: 2020-08-20 | Stop reason: HOSPADM

## 2020-08-17 RX ORDER — CYCLOBENZAPRINE HCL 10 MG
5 TABLET ORAL
Status: DISCONTINUED | OUTPATIENT
Start: 2020-08-17 | End: 2020-08-20 | Stop reason: HOSPADM

## 2020-08-17 RX ORDER — SODIUM CHLORIDE 0.9 % (FLUSH) 0.9 %
5-40 SYRINGE (ML) INJECTION AS NEEDED
Status: DISCONTINUED | OUTPATIENT
Start: 2020-08-17 | End: 2020-08-20 | Stop reason: HOSPADM

## 2020-08-17 RX ORDER — DEXTROSE 50 % IN WATER (D50W) INTRAVENOUS SYRINGE
12.5-25 AS NEEDED
Status: DISCONTINUED | OUTPATIENT
Start: 2020-08-17 | End: 2020-08-20 | Stop reason: HOSPADM

## 2020-08-17 RX ORDER — IBUPROFEN 800 MG/1
800 TABLET ORAL 3 TIMES DAILY
COMMUNITY
Start: 2020-08-15 | End: 2020-08-20

## 2020-08-17 RX ORDER — METOPROLOL TARTRATE 25 MG/1
25 TABLET, FILM COATED ORAL 2 TIMES DAILY
Status: DISCONTINUED | OUTPATIENT
Start: 2020-08-17 | End: 2020-08-20 | Stop reason: HOSPADM

## 2020-08-17 RX ORDER — ENOXAPARIN SODIUM 100 MG/ML
40 INJECTION SUBCUTANEOUS EVERY 12 HOURS
Status: DISCONTINUED | OUTPATIENT
Start: 2020-08-17 | End: 2020-08-20 | Stop reason: HOSPADM

## 2020-08-17 RX ORDER — BUDESONIDE 0.25 MG/2ML
250 INHALANT ORAL
Status: DISCONTINUED | OUTPATIENT
Start: 2020-08-17 | End: 2020-08-20 | Stop reason: HOSPADM

## 2020-08-17 RX ADMIN — Medication 10 ML: at 08:34

## 2020-08-17 RX ADMIN — IPRATROPIUM BROMIDE AND ALBUTEROL SULFATE 3 ML: .5; 3 SOLUTION RESPIRATORY (INHALATION) at 15:35

## 2020-08-17 RX ADMIN — FUROSEMIDE 40 MG: 40 TABLET ORAL at 12:06

## 2020-08-17 RX ADMIN — INSULIN LISPRO 5 UNITS: 100 INJECTION, SOLUTION INTRAVENOUS; SUBCUTANEOUS at 23:56

## 2020-08-17 RX ADMIN — IPRATROPIUM BROMIDE AND ALBUTEROL SULFATE 3 ML: .5; 3 SOLUTION RESPIRATORY (INHALATION) at 01:33

## 2020-08-17 RX ADMIN — ACETAZOLAMIDE SODIUM 500 MG: 500 INJECTION, POWDER, LYOPHILIZED, FOR SOLUTION INTRAVENOUS at 16:39

## 2020-08-17 RX ADMIN — HUMAN INSULIN 8 UNITS: 100 INJECTION, SUSPENSION SUBCUTANEOUS at 16:40

## 2020-08-17 RX ADMIN — ATORVASTATIN CALCIUM 10 MG: 10 TABLET, FILM COATED ORAL at 21:11

## 2020-08-17 RX ADMIN — METHYLPREDNISOLONE SODIUM SUCCINATE 125 MG: 125 INJECTION, POWDER, FOR SOLUTION INTRAMUSCULAR; INTRAVENOUS at 07:44

## 2020-08-17 RX ADMIN — ALBUTEROL SULFATE 5 MG: 2.5 SOLUTION RESPIRATORY (INHALATION) at 07:48

## 2020-08-17 RX ADMIN — INSULIN LISPRO 2 UNITS: 100 INJECTION, SOLUTION INTRAVENOUS; SUBCUTANEOUS at 12:29

## 2020-08-17 RX ADMIN — IPRATROPIUM BROMIDE AND ALBUTEROL SULFATE 3 ML: .5; 3 SOLUTION RESPIRATORY (INHALATION) at 19:41

## 2020-08-17 RX ADMIN — HUMAN INSULIN 8 UNITS: 100 INJECTION, SUSPENSION SUBCUTANEOUS at 12:18

## 2020-08-17 RX ADMIN — NYSTATIN: 100000 CREAM TOPICAL at 12:29

## 2020-08-17 RX ADMIN — INSULIN LISPRO 5 UNITS: 100 INJECTION, SOLUTION INTRAVENOUS; SUBCUTANEOUS at 18:00

## 2020-08-17 RX ADMIN — PANTOPRAZOLE SODIUM 40 MG: 40 TABLET, DELAYED RELEASE ORAL at 12:05

## 2020-08-17 RX ADMIN — ACETAMINOPHEN 650 MG: 325 TABLET ORAL at 16:40

## 2020-08-17 RX ADMIN — LORATADINE 10 MG: 10 TABLET ORAL at 12:07

## 2020-08-17 RX ADMIN — FUROSEMIDE 40 MG: 40 TABLET ORAL at 18:00

## 2020-08-17 RX ADMIN — FLUTICASONE PROPIONATE 2 SPRAY: 50 SPRAY, METERED NASAL at 12:14

## 2020-08-17 RX ADMIN — INSULIN GLARGINE 20 UNITS: 100 INJECTION, SOLUTION SUBCUTANEOUS at 12:17

## 2020-08-17 RX ADMIN — METOPROLOL TARTRATE 25 MG: 25 TABLET, FILM COATED ORAL at 12:13

## 2020-08-17 RX ADMIN — POTASSIUM CHLORIDE 10 MEQ: 750 TABLET, FILM COATED, EXTENDED RELEASE ORAL at 12:06

## 2020-08-17 RX ADMIN — METHYLPREDNISOLONE SODIUM SUCCINATE 40 MG: 40 INJECTION, POWDER, FOR SOLUTION INTRAMUSCULAR; INTRAVENOUS at 16:41

## 2020-08-17 RX ADMIN — IPRATROPIUM BROMIDE AND ALBUTEROL SULFATE 3 ML: .5; 3 SOLUTION RESPIRATORY (INHALATION) at 12:02

## 2020-08-17 RX ADMIN — NYSTATIN: 100000 CREAM TOPICAL at 17:12

## 2020-08-17 RX ADMIN — ENOXAPARIN SODIUM 40 MG: 40 INJECTION SUBCUTANEOUS at 21:11

## 2020-08-17 RX ADMIN — BUDESONIDE 250 MCG: 0.25 INHALANT RESPIRATORY (INHALATION) at 19:47

## 2020-08-17 RX ADMIN — Medication 10 ML: at 16:42

## 2020-08-17 RX ADMIN — ARFORMOTEROL TARTRATE 15 MCG: 15 SOLUTION RESPIRATORY (INHALATION) at 19:47

## 2020-08-17 RX ADMIN — METOPROLOL TARTRATE 25 MG: 25 TABLET, FILM COATED ORAL at 18:00

## 2020-08-17 RX ADMIN — INSULIN LISPRO 2 UNITS: 100 INJECTION, SOLUTION INTRAVENOUS; SUBCUTANEOUS at 08:33

## 2020-08-17 RX ADMIN — ENOXAPARIN SODIUM 40 MG: 40 INJECTION SUBCUTANEOUS at 08:32

## 2020-08-17 RX ADMIN — ASPIRIN 81 MG CHEWABLE TABLET 81 MG: 81 TABLET CHEWABLE at 12:06

## 2020-08-17 RX ADMIN — Medication 10 ML: at 21:11

## 2020-08-17 NOTE — PROGRESS NOTES
.6520  TRANSFER - IN REPORT:    Verbal report received from LAURA(name) on Yahaira Kidd  being received from ED(unit) for routine progression of care      Report consisted of patients Situation, Background, Assessment and   Recommendations(SBAR). Information from the following report(s) SBAR was reviewed with the receiving nurse. Opportunity for questions and clarification was provided. Assessment completed upon patients arrival to unit and care assumed.      Bedside(SBAR) Shift report given to NORMA BLANC

## 2020-08-17 NOTE — H&P
Hospitalist Admission Note    NAME: Arnie Mcmanus   :  1970   MRN:  [id number]     Date/Time:  2020 8:13 AM    Patient PCP: Kj Galvan MD  ______________________________________________________________________  Given the patient's current clinical presentation, I have a high level of concern for decompensation if discharged from the emergency department. Complex decision making was performed, which includes reviewing the patient's available past medical records, laboratory results, and x-ray films. My assessment of this patient's clinical condition and my plan of care is as follows. Assessment / Plan:  Acute on chronic hypoxic and hypercapnic respiratory failure/COPD exacerbation  OHS, is suppose to use trilogy at home  CXR neg for acute process. Rapid covid test ordered in the ER and is pending. She had one negative last week per pt  VBG reviewed  Will start IV solumedrol. No indication for abx as there is no association with fever or cough. marko prn, advair  Will consult pulm to help with home trilogy setting    Chronic systolic HF  HTN  CAD  Not in acute exacerbation  Cont' PO lasix, BB, statin, strict I&O    T2DM  Lower dose lantus  SSI  Add NPH link with steroids    L2 sclerotic lesion/history of breast cancer  outpatient follow-up with her oncologist    super morbid obesity  Body mass index is 67.19 kg/m². Code Status: Full  Surrogate Decision Maker:  DVT Prophylaxis: lovenox  GI Prophylaxis: not indicated  Baseline:       Subjective:   CHIEF COMPLAINT: worsening of SOB/unable to catch breath    HISTORY OF PRESENT ILLNESS:     Jairo Smith is a 52 y.o.  female with PMHx significant for T2DM, COPD, HTN, CAD, hx of SVT, present to the ER c/o ~4 days of SOB that has been getting worst.  Pt at baseline uses triology, states she feels her bipap setting at home is not working as she remains SOB while using it.   Pt tried to get an appnt with her doctor for triology adjustment but was unable to get an appnt soon enough. Her baseline SOB has been worst in the last 4 days which lead her to the ER for evaluation. Other associated symptoms includes diaphoresis and congestion. She denies any association to fever, chills, cough, cp, palpitations, n/v/d. Pt states she called [facility] 2 days ago and she was started on PO steroids and had Covid19 checked last week which was negative. Pt states she has been compliance with prednisone however symptoms progressively worsens which lead her to the ER for further evaluation. Vitals/images/labs reviewed    We were asked to admit for work up and evaluation of the above problems.      Past Medical History:   Diagnosis Date    Arthritis     Asthma     Breast lump     CAD (coronary artery disease)     Cancer (HCC)     breast cancer    Congestive heart failure (HCC)     [facility]     Diabetes ([facility]     Hypertension     Morbid obesity with BMI of 70 and over, adult ([facility]     [facility]STEM[facility]non-[facility] elevated [facility]     NSTEMI (non-ST elevated myocardial infarction) ([facility] 2012    SVT (supraventricular tachycardia) ([facility])         Past Surgical History:   Procedure Laterality Date    CARDIAC SURG PROCEDURE UNLIST      Stents    HX  SECTION      HX ORTHOPAEDIC      HX OTHER SURGICAL      cyst removed from back       Social History     Tobacco Use    Smoking status: Former Smoker     Packs/day: 0.25     Types: Cigarettes    Smokeless tobacco: Never Used    Tobacco comment: Patient states \"I  aint smoking no more d*mn cigarettes after yesterday\" 2018   Substance Use Topics    Alcohol use: No        Family History   Problem Relation Age of Onset    Heart Disease Mother     Heart Disease Father     Heart Disease Sister     Breast Cancer Maternal Grandmother     Breast Cancer Paternal Grandmother      No Known Allergies Prior to Admission medications    Medication Sig Start Date End Date Taking? Authorizing Provider   ammonium lactate (LAC-HYDRIN) 12 % lotion Apply 1 Applicator to affected area as needed. rub in to affected area well    Provider, Historical   omeprazole (PRILOSEC) 40 mg capsule Take 40 mg by mouth daily. Provider, Historical   potassium chloride SR (KLOR-CON 10) 10 mEq tablet Take 10 mEq by mouth four (4) times daily. Provider, Historical   nystatin (MYCOSTATIN) topical cream Apply  to affected area two (2) times a day. 9/16/19   Honorio Garcia MD   cyclobenzaprine (FLEXERIL) 5 mg tablet Take 1 Tab by mouth two (2) times daily as needed for Muscle Spasm(s). flexeriol 3/17/19   Astrid Almazan NP   albuterol-ipratropium (DUO-NEB) 2.5 mg-0.5 mg/3 ml nebu 3 mL by Nebulization route four (4) times daily. 3/3/19   Roddy Vizcaino MD   furosemide (LASIX) 40 mg tablet Take 40 mg by mouth two (2) times a day. Other, MD Landon   fluticasone (FLONASE) 50 mcg/actuation nasal spray 2 Sprays by Both Nostrils route daily. 1/6/19   Kellie Reilly MD   insulin glargine (LANTUS) 100 unit/mL injection 35 Units by SubCUTAneous route daily. Provider, Historical   metoprolol tartrate (LOPRESSOR) 25 mg tablet Take 25 mg by mouth two (2) times a day. Provider, Historical   fluticasone-salmeterol (ADVAIR DISKUS) 500-50 mcg/dose diskus inhaler Take 1 Puff by inhalation daily. Provider, Historical   loratadine (CLARITIN) 10 mg tablet Take 10 mg by mouth daily. Provider, Historical   albuterol (VENTOLIN HFA) 90 mcg/actuation inhaler Take 2 Puffs by inhalation every six (6) hours as needed for Wheezing. Provider, Historical   aspirin 81 mg chewable tablet Take 81 mg by mouth daily. Provider, Historical   lovastatin (MEVACOR) 40 mg tablet Take 1 Tab by mouth nightly. 1/14/16   Diegobarbara Parry NP   glyBURIDE (DIABETA) 5 mg tablet Take 5 mg by mouth two (2) times daily (with meals).     Provider, Historical nitroglycerin (NITROSTAT) 0.4 mg SL tablet 1 Tab by SubLINGual route every five (5) minutes as needed for Chest Pain (call 911 if not relieved by 3). 9/12/13   Shasha Sam NP       REVIEW OF SYSTEMS:     I am not able to complete the review of systems because:    The patient is intubated and sedated    The patient has altered mental status due to his acute medical problems    The patient has baseline aphasia from prior stroke(s)    The patient has baseline dementia and is not reliable historian    The patient is in acute medical distress and unable to provide information           Total of 12 systems reviewed as follows:       POSITIVE= BOLD text  Negative = text not BOLD  General:  fever, chills, sweats, generalized weakness, weight loss/gain,      loss of appetite   Eyes:    blurred vision, eye pain, loss of vision, double vision  ENT:    rhinorrhea, pharyngitis   Respiratory:   cough, sputum production, SOB, RAZO, wheezing, pleuritic pain   Cardiology:   chest pain, palpitations, orthopnea, PND, edema, syncope   Gastrointestinal:  abdominal pain , N/V, diarrhea, dysphagia, constipation, bleeding   Genitourinary:  frequency, urgency, dysuria, hematuria, incontinence   Muskuloskeletal :  arthralgia, myalgia, back pain  Hematology:  easy bruising, nose or gum bleeding, lymphadenopathy   Dermatological: rash, ulceration, pruritis, color change / jaundice  Endocrine:   hot flashes or polydipsia   Neurological:  headache, dizziness, confusion, focal weakness, paresthesia,     Speech difficulties, memory loss, gait difficulty  Psychological: Feelings of anxiety, depression, agitation    Objective:   VITALS:    Visit Vitals  /76   Pulse 76   Temp (!) 96.7 °F (35.9 °C)   Resp 24   Ht 5' 7\" (1.702 m)   Wt (!) 194.6 kg (429 lb)   SpO2 96%   BMI 67.19 kg/m²       PHYSICAL EXAM:    General:    Obese female in bed, on bipap   HEENT: Atraumatic, anicteric sclerae, pink conjunctivae     No oral ulcers, mucosa moist, throat clear  Neck:  Supple, symmetrical,  thyroid: non tender  Lungs:   CTA b/l. No wheezing or Rhonchi. No rales. Chest wall:  No tenderness. No accessory muscle use. Heart:   Regular  rhythm,  No  Murmur. Chronic lymphedema   Abdomen:   Obese, NT. ND  BS+  Extremities: No cyanosis. No clubbing,      Skin turgor normal, Radial dial pulse 2+. Capillary refill normal  Skin:     Not pale. Not Jaundiced  No rashes   Psych:  Not anxious or agitated. Neurologic: No facial asymmetry. No aphasia or slurred speech. Symmetrical strength, Sensation grossly intact. AAOx4.     _______________________________________________________________________  Care Plan discussed with:    Comments   Patient x    Family  x    RN x    Care Manager                    Consultant:  loreta HAWKINS physician   _______________________________________________________________________  Expected  Disposition:   Home with Family    HH/PT/OT/RN x   SNF/LTC    LUIS A    ________________________________________________________________________  TOTAL TIME:  72 Minutes    Critical Care Provided     Minutes non procedure based      Comments    x Reviewed previous records   >50% of visit spent in counseling and coordination of care x Discussion with patient and/or family and questions answered       ________________________________________________________________________  Signed: Leena Greene MD    Procedures: see electronic medical records for all procedures/Xrays and details which were not copied into this note but were reviewed prior to creation of Plan.     LAB DATA REVIEWED:    Recent Results (from the past 24 hour(s))   EKG, 12 LEAD, INITIAL    Collection Time: 08/17/20 12:38 AM   Result Value Ref Range    Ventricular Rate 78 BPM    Atrial Rate 78 BPM    P-R Interval 188 ms    QRS Duration 88 ms    Q-T Interval 370 ms    QTC Calculation (Bezet) 421 ms    Calculated P Axis 31 degrees    Calculated R Axis 68 degrees    Calculated T Axis 46 degrees    Diagnosis Sinus rhythm with premature atrial complexes with aberrant conduction  Low voltage QRS  When compared with ECG of 25-JUL-2020 22:14,  aberrant conduction is now present     CBC WITH AUTOMATED DIFF    Collection Time: 08/17/20 12:45 AM   Result Value Ref Range    WBC 11.4 (H) 3.6 - 11.0 K/uL    RBC 4.85 3.80 - 5.20 M/uL    HGB 13.6 11.5 - 16.0 g/dL    HCT 46.7 35.0 - 47.0 %    MCV 96.3 80.0 - 99.0 FL    MCH 28.0 26.0 - 34.0 PG    MCHC 29.1 (L) 30.0 - 36.5 g/dL    RDW 13.2 11.5 - 14.5 %    PLATELET 190 779 - 812 K/uL    MPV 11.0 8.9 - 12.9 FL    NRBC 0.0 0  WBC    ABSOLUTE NRBC 0.00 0.00 - 0.01 K/uL    NEUTROPHILS 95 (H) 32 - 75 %    LYMPHOCYTES 4 (L) 12 - 49 %    MONOCYTES 1 (L) 5 - 13 %    EOSINOPHILS 0 0 - 7 %    BASOPHILS 0 0 - 1 %    IMMATURE GRANULOCYTES 0 0.0 - 0.5 %    ABS. NEUTROPHILS 10.8 (H) 1.8 - 8.0 K/UL    ABS. LYMPHOCYTES 0.5 (L) 0.8 - 3.5 K/UL    ABS. MONOCYTES 0.1 0.0 - 1.0 K/UL    ABS. EOSINOPHILS 0.0 0.0 - 0.4 K/UL    ABS. BASOPHILS 0.0 0.0 - 0.1 K/UL    ABS. IMM. GRANS. 0.0 0.00 - 0.04 K/UL    DF SMEAR SCANNED      RBC COMMENTS NORMOCYTIC, NORMOCHROMIC     METABOLIC PANEL, COMPREHENSIVE    Collection Time: 08/17/20 12:45 AM   Result Value Ref Range    Sodium 138 136 - 145 mmol/L    Potassium 4.5 3.5 - 5.1 mmol/L    Chloride 103 97 - 108 mmol/L    CO2 35 (H) 21 - 32 mmol/L    Anion gap 0 (L) 5 - 15 mmol/L    Glucose 233 (H) 65 - 100 mg/dL    BUN 19 6 - 20 MG/DL    Creatinine 0.71 0.55 - 1.02 MG/DL    BUN/Creatinine ratio 27 (H) 12 - 20      GFR est AA >60 >60 ml/min/1.73m2    GFR est non-AA >60 >60 ml/min/1.73m2    Calcium 10.2 (H) 8.5 - 10.1 MG/DL    Bilirubin, total 0.3 0.2 - 1.0 MG/DL    ALT (SGPT) 18 12 - 78 U/L    AST (SGOT) 12 (L) 15 - 37 U/L    Alk.  phosphatase 102 45 - 117 U/L    Protein, total 8.4 (H) 6.4 - 8.2 g/dL    Albumin 3.5 3.5 - 5.0 g/dL    Globulin 4.9 (H) 2.0 - 4.0 g/dL    A-G Ratio 0.7 (L) 1.1 - 2.2     NT-PRO BNP    Collection Time: 08/17/20 12:45 AM   Result Value Ref Range    NT pro- (H) <125 PG/ML   CK W/ CKMB & INDEX    Collection Time: 08/17/20 12:45 AM   Result Value Ref Range    CK - MB 1.5 <3.6 NG/ML    CK-MB Index 5.4 (H) 0.0 - 2.5      CK 28 26 - 192 U/L   TROPONIN I    Collection Time: 08/17/20 12:45 AM   Result Value Ref Range    Troponin-I, Qt. <0.05 <0.05 ng/mL   POC EG7    Collection Time: 08/17/20  1:04 AM   Result Value Ref Range    Calcium, ionized (POC) 1.43 (H) 1.12 - 1.32 mmol/L    pH (POC) 7.28 (L) 7.35 - 7.45      pCO2 (POC) 77.0 (H) 35.0 - 45.0 MMHG    pO2 (POC) 41 (LL) 80 - 100 MMHG    HCO3 (POC) 36.0 (H) 22 - 26 MMOL/L    Base excess (POC) 9 mmol/L    sO2 (POC) 67 (L) 92 - 97 %    Site OTHER      Device: NASAL CANNULA      Flow rate (POC) 4 L/M    Allens test (POC) N/A      Specimen type (POC) VENOUS BLOOD      Total resp. rate 20     POC EG7    Collection Time: 08/17/20  5:11 AM   Result Value Ref Range    Calcium, ionized (POC) 1.45 (H) 1.12 - 1.32 mmol/L    FIO2 (POC) 100 %    pH (POC) 7.27 (L) 7.35 - 7.45      pCO2 (POC) 82.3 (H) 35.0 - 45.0 MMHG    pO2 (POC) 44 (LL) 80 - 100 MMHG    HCO3 (POC) 37.7 (H) 22 - 26 MMOL/L    Base excess (POC) 11 mmol/L    sO2 (POC) 70 (L) 92 - 97 %    Site OTHER      Device: BIPAP      PEEP/CPAP (POC) 12 cmH2O    PIP (POC) 21      Allens test (POC) N/A      Specimen type (POC) VENOUS BLOOD      Total resp. rate 22     POC EG7    Collection Time: 08/17/20  6:26 AM   Result Value Ref Range    Calcium, ionized (POC) 1.42 (H) 1.12 - 1.32 mmol/L    FIO2 (POC) 100 %    pH (POC) 7.29 (L) 7.35 - 7.45      pCO2 (POC) 74.5 (H) 35.0 - 45.0 MMHG    pO2 (POC) 89 80 - 100 MMHG    HCO3 (POC) 35.9 (H) 22 - 26 MMOL/L    Base excess (POC) 9 mmol/L    sO2 (POC) 95 92 - 97 %    Site OTHER      Device: BIPAP      PEEP/CPAP (POC) 12 cmH2O    Allens test (POC) N/A      Specimen type (POC) VENOUS BLOOD      Total resp.  rate 34

## 2020-08-17 NOTE — PROGRESS NOTES
[facility] END OF SHIFT REPORT      Bedside shift change report GIVEN TO Trenton Astudillo RN. Report included the following information SBAR, Kardex, Intake/Output, MAR and Med Rec Status. SIGNIFICANT CHANGES DURING SHIFT:        CONCERNS TO ADDRESS WITH MD:          [facility] NURSING NOTE   Admission Date 8/17/2020   Admission Diagnosis COPD exacerbation ([address] [J44.1]   Consults IP CONSULT TO PULMONOLOGY      Cardiac Monitoring [x] Yes [] No      Purposeful Hourly Rounding [x] Yes    Yu Score Total Score: 2   Yu score 3 or > [] Bed Alarm [] Avasys [] 1:1 sitter [] Patient refused (Signed refusal form in chart)   Jeffrey Score Jeffrey Score: 20   Jeffrey score 14 or < [] PMT consult [] Wound Care consult    []  Specialty bed  [] Nutrition consult      Influenza Vaccine Received Flu Vaccine for Current Season (usually Sept-March): Not Flu Season           Oxygen needs? [] Room air Oxygen @  []1L    []2L    []3L   []4L    []5L   [x]6L via  NC   Chronic home O2 use? [x] Yes [] No  Perform O2 challenge test and document in progress note using smart[employer] (.Homeoxygen)      Last bowel movement Last Bowel Movement Date: 08/16/20      Urinary Catheter             LDAs               Peripheral IV 08/17/20 Left Antecubital (Active)   Site Assessment Clean, dry, & intact 08/17/20 1936   Phlebitis Assessment 0 08/17/20 1936   Infiltration Assessment 0 08/17/20 1936   Dressing Status Clean, dry, & intact 08/17/20 1936   Dressing Type Transparent 08/17/20 1936   Hub Color/Line Status Pink;Capped 08/17/20 1936                         Readmission Risk Assessment Tool Score High Risk            23       Total Score        3 Has Seen PCP in Last 6 Months (Yes=3, No=0)    4 IP Visits Last 12 Months (1-3=4, 4=9, >4=11)    9 Pt. Coverage (Medicare=5 , Medicaid, or Self-Pay=4)    7 Charlson Comorbidity Score (Age + Comorbid Conditions)        Criteria that do not apply:    . Living with Significant Other. Assisted Living. LTAC. SNF.  or Rehab    Patient Length of Stay (>5 days = 3)       Expected Length of Stay 3d 14h   Actual Length of Stay 0

## 2020-08-17 NOTE — PROGRESS NOTES
Problem: Falls - Risk of  Goal: *Absence of Falls  Description: Document [place] Fall Risk and appropriate interventions in the flowsheet.   Outcome: Progressing Towards Goal  Note: Fall Risk Interventions:            Medication Interventions: Bed/chair exit alarm, Evaluate medications/consider consulting pharmacy, Patient to call before getting OOB, Teach patient to arise slowly    Elimination Interventions: Call light in reach, Bed/chair exit alarm, Elevated toilet seat, Stay With Me (per policy), Patient to call for help with toileting needs, Toilet paper/wipes in reach, Toileting schedule/hourly rounds

## 2020-08-17 NOTE — ED NOTES
Pt requested  to come off BIPAP. Removed BIPAP per orders. Placed Pt on  baseline of 5L of nasal cannula. Pt ambulate to bedside commode without difficulty. Pt requested to have something to eat.  Pt is still NPO notify MD

## 2020-08-17 NOTE — ED NOTES
0111: Xray at bedside. 0130: RT at bedside to placed patient on BIPAP. 0250: Patient placed in hospital bed for comfort. Remains on the monitor x 3. SR x 2. Call bell in reach. 0320: Patient resting in bed at this time. Patient reports she is more comfortable with BIPAP. Patient remains on monitor x 3.    0666: Patient continues to rest in bed. No needs at this time. No other changes to not at this time. 0515: RT at bedside for VBG. Patient remains on the monitor x 3. Resting comfortably. 6537: Bedside shift change report given to Dick Mendez RN (oncoming nurse) by Salvador Bone RN (offgoing nurse). Report included the following information SBAR, Kardex, ED Summary, Intake/Output, MAR and Recent Results.

## 2020-08-17 NOTE — ED PROVIDER NOTES
EMERGENCY DEPARTMENT HISTORY AND PHYSICAL EXAM      Date: 8/17/2020  Patient Name: Finley Mcburney    History of Presenting Illness     Chief Complaint   Patient presents with    Shortness of Breath     Pt arrives to ED with SOB x 4 days. Patient wears 5L NC at home. Reports that Chris Delgadillo visited her at home 2 days ago, prescribed prednisone to her that has not been helping. Also reports she was tested for COVID which resulted negative. History Provided By: Patient    HPI: Finley Mcburney, 52 y.o. female with PMHx significant for congestive heart failure, COPD, hypertension, morbid obesity, and STEMI, SVT presents to the ED with chief complaint of shortness of breath for the past 3 to 4 days that has been worse in the past 2 days. Patient reports that she is supposed to be on 5 L of oxygen nasal cannula at all times, but it bothers her so she has trended down to 4. She denies any cough or fever. She denies any chest pain, nausea, vomiting. She also states that she does not like her BiPAP machine at home and has a hard time tolerating it, but when she is on BiPAP in the hospital it works fine. She reports she has been having some sweatiness intermittently. She had one episode last week and then another last night. She denies any chest pain, and had some tingling in her left arm. She states her shortness of breath is worse when she lies down. PCP: Lney Mccullough MD    No current facility-administered medications on file prior to encounter. Current Outpatient Medications on File Prior to Encounter   Medication Sig Dispense Refill    ammonium lactate (LAC-HYDRIN) 12 % lotion Apply 1 Applicator to affected area as needed. rub in to affected area well      omeprazole (PRILOSEC) 40 mg capsule Take 40 mg by mouth daily.  potassium chloride SR (KLOR-CON 10) 10 mEq tablet Take 10 mEq by mouth four (4) times daily.       nystatin (MYCOSTATIN) topical cream Apply  to affected area two (2) times a day. 15 g 0    cyclobenzaprine (FLEXERIL) 5 mg tablet Take 1 Tab by mouth two (2) times daily as needed for Muscle Spasm(s). flexeriol 5 Tab 0    albuterol-ipratropium (DUO-NEB) 2.5 mg-0.5 mg/3 ml nebu 3 mL by Nebulization route four (4) times daily. 100 Nebule 1    furosemide (LASIX) 40 mg tablet Take 40 mg by mouth two (2) times a day.  fluticasone (FLONASE) 50 mcg/actuation nasal spray 2 Sprays by Both Nostrils route daily. 1 Bottle 0    insulin glargine (LANTUS) 100 unit/mL injection 35 Units by SubCUTAneous route daily.  metoprolol tartrate (LOPRESSOR) 25 mg tablet Take 25 mg by mouth two (2) times a day.  fluticasone-salmeterol (ADVAIR DISKUS) 500-50 mcg/dose diskus inhaler Take 1 Puff by inhalation daily.  loratadine (CLARITIN) 10 mg tablet Take 10 mg by mouth daily.  albuterol (VENTOLIN HFA) 90 mcg/actuation inhaler Take 2 Puffs by inhalation every six (6) hours as needed for Wheezing.  aspirin 81 mg chewable tablet Take 81 mg by mouth daily.  lovastatin (MEVACOR) 40 mg tablet Take 1 Tab by mouth nightly. 30 Tab 6    glyBURIDE (DIABETA) 5 mg tablet Take 5 mg by mouth two (2) times daily (with meals).  nitroglycerin (NITROSTAT) 0.4 mg SL tablet 1 Tab by SubLINGual route every five (5) minutes as needed for Chest Pain (call 911 if not relieved by 3).  25 Tab 2       Past History     Past Medical History:  Past Medical History:   Diagnosis Date    Arthritis     Asthma     Breast lump     CAD (coronary artery disease)     Cancer (HCC)     breast cancer    Congestive heart failure (HCC)     COPD (chronic obstructive pulmonary disease) (HCC)     Diabetes [facility]     Hypertension     Morbid obesity with BMI of 70 and over, adult ([facility] [facility]     NSTEMI (non-ST elevated myocardial infarction) ([facility] [facility]     SVT (supraventricular tachycardia) ([facility] [address] [facility]        Past Surgical History:  Past Surgical History:   Procedure Laterality Date    CARDIAC SURG PROCEDURE UNLIST      Stents    HX  SECTION      HX ORTHOPAEDIC      HX OTHER SURGICAL      cyst removed from back       Family History:  Family History   Problem Relation Age of Onset    Heart Disease Mother     Heart Disease Father     Heart Disease Sister     Breast Cancer Maternal Grandmother     Breast Cancer Paternal Grandmother        Social History:  Social History     Tobacco Use    Smoking status: Former Smoker     Packs/day: 0.25     Types: Cigarettes    Smokeless tobacco: Never Used    Tobacco comment: Patient states \"I  aint smoking no more d*mn cigarettes after yesterday\" 2018   Substance Use Topics    Alcohol use: No    Drug use: No       Allergies:  No Known Allergies      Review of Systems   Review of Systems   Constitutional: Negative for chills and fever. HENT: Negative for congestion, ear pain, rhinorrhea and sore throat. Respiratory: Positive for shortness of breath. Negative for cough, chest tightness and wheezing. Cardiovascular: Positive for leg swelling. Negative for chest pain and palpitations. Gastrointestinal: Negative for abdominal pain, diarrhea, nausea and vomiting. Genitourinary: Negative for dysuria, flank pain and hematuria. Musculoskeletal: Negative for back pain and myalgias. Skin: Negative for rash and wound. Neurological: Negative for dizziness, syncope, light-headedness and headaches. Psychiatric/Behavioral: Negative for confusion. The patient is not nervous/anxious. All other systems reviewed and are negative. Physical Exam   General appearance -morbidly obese, well appearing, and in no distress, speaking in complete sentences  Eyes - pupils equal and reactive, extraocular eye movements intact  ENT - mucous membranes moist, pharynx normal without lesions  Neck - supple, no significant adenopathy; non-tender to palpation  Chest -decreased air movement bilateral lung fields, but not tachypneic or in any respiratory distress.   She speaking in complete sentences non-tender to palpation  Heart - normal rate and regular rhythm, S1 and S2 normal, no murmurs noted  Abdomen - soft, nontender, nondistended, no masses or organomegaly  Musculoskeletal - no joint tenderness, deformity or swelling; normal ROM  Extremities - peripheral pulses normal, 4+ pitting edema bilateral lower extremities, left greater than right, with chronic venous stasis changes more prominent on the left   skin - normal coloration and turgor, no rashes  Neurological - alert, oriented x3, normal speech, no focal findings or movement disorder noted    Diagnostic Study Results     Labs -     Recent Results (from the past 12 hour(s))   EKG, 12 LEAD, INITIAL    Collection Time: 08/17/20 12:38 AM   Result Value Ref Range    Ventricular Rate 78 BPM    Atrial Rate 78 BPM    P-R Interval 188 ms    QRS Duration 88 ms    Q-T Interval 370 ms    QTC Calculation (Bezet) 421 ms    Calculated P Axis 31 degrees    Calculated R Axis 68 degrees    Calculated T Axis 46 degrees    Diagnosis       Sinus rhythm with premature atrial complexes with aberrant conduction  Low voltage QRS  When compared with ECG of 25-JUL-2020 22:14,  aberrant conduction is now present     CBC WITH AUTOMATED DIFF    Collection Time: 08/17/20 12:45 AM   Result Value Ref Range    WBC 11.4 (H) 3.6 - 11.0 K/uL    RBC 4.85 3.80 - 5.20 M/uL    HGB 13.6 11.5 - 16.0 g/dL    HCT 46.7 35.0 - 47.0 %    MCV 96.3 80.0 - 99.0 FL    MCH 28.0 26.0 - 34.0 PG    MCHC 29.1 (L) 30.0 - 36.5 g/dL    RDW 13.2 11.5 - 14.5 %    PLATELET 010 313 - 380 K/uL    MPV 11.0 8.9 - 12.9 FL    NRBC 0.0 0  WBC    ABSOLUTE NRBC 0.00 0.00 - 0.01 K/uL    NEUTROPHILS PENDING %    LYMPHOCYTES PENDING %    MONOCYTES PENDING %    EOSINOPHILS PENDING %    BASOPHILS PENDING %    IMMATURE GRANULOCYTES PENDING %    ABS. NEUTROPHILS PENDING K/UL    ABS. LYMPHOCYTES PENDING K/UL    ABS. MONOCYTES PENDING K/UL    ABS. EOSINOPHILS PENDING K/UL    ABS.  BASOPHILS PENDING K/UL    ABS. IMM. GRANS. PENDING K/UL    DF PENDING    POC EG7    Collection Time: 08/17/20  1:04 AM   Result Value Ref Range    Calcium, ionized (POC) 1.43 (H) 1.12 - 1.32 mmol/L    pH (POC) 7.28 (L) 7.35 - 7.45      pCO2 (POC) 77.0 (H) 35.0 - 45.0 MMHG    pO2 (POC) 41 (LL) 80 - 100 MMHG    HCO3 (POC) 36.0 (H) 22 - 26 MMOL/L    Base excess (POC) 9 mmol/L    sO2 (POC) 67 (L) 92 - 97 %    Site OTHER      Device: NASAL CANNULA      Flow rate (POC) 4 L/M    Allens test (POC) N/A      Specimen type (POC) VENOUS BLOOD      Total resp. rate 20         Radiologic Studies -   XR CHEST PORT    (Results Pending)     CT Results  (Last 48 hours)    None        CXR Results  (Last 48 hours)    None            Medical Decision Making   I am the first provider for this patient. I reviewed the vital signs, available nursing notes, past medical history, past surgical history, family history and social history. Vital Signs-Reviewed the patient's vital signs. Patient Vitals for the past 12 hrs:   Pulse Resp BP SpO2   08/17/20 0025    91 %   08/17/20 0024   132/66 91 %   08/17/20 0020 91 18 132/66 91 %       EKG at 12:38 AM on August 17, 2020 interpreted by me, sinus rhythm with PACs, 78 bpm, normal axis, normal SD, QRS, QTc intervals, no ischemic changes    Records Reviewed: Nursing Notes and Old Medical Records    Provider Notes (Medical Decision Making):   Differential diagnosis: Pneumonia, bronchitis, COPD exacerbation, congestive heart failure, acute coronary syndrome  We will check CBC, CMP, CPK, troponin, proBNP, chest x-ray, VBG. Will consider BiPAP if patient's CO2 is elevated compared to her baseline. ED Course:   Initial assessment performed. The patients presenting problems have been discussed, and they are in agreement with the care plan formulated and outlined with them. I have encouraged them to ask questions as they arise throughout their visit.     Progress Notes:  ED Course as of Aug 22 2048 Mon Aug 17, 2020   [id number] Patient reports still feeling significantly more short of breath than usual even after being on BiPAP for several hours. Her VBG has improved slightly, but she is still quite hypercarbic although this is likely not far off her baseline. She is still symptomatic and her improvement is only slight even on BiPAP, I am concerned that when she does not wear her BiPAP at home, that her condition could worsen and that she could develop altered mental status due to her hypercarbia. I have discussed case with the hospitalist and they have agreed to admit patient for observation given her noncompliance and high risk of deterioration if she were to be discharged.  [AO]      ED Course User Index   Evette Garner MD       Disposition:  Admit to hospitalist        Diagnosis     Clinical Impression:   1. Acute exacerbation of chronic obstructive pulmonary disease (COPD) ([address]    2. Morbid obesity ([address]    3. Hypercapnic respiratory failure, chronic ([address]    4.  Lymphedema of left lower extremity

## 2020-08-17 NOTE — PROGRESS NOTES
Problem: Falls - Risk of  Goal: *Absence of Falls  Description: Document [place] Fall Risk and appropriate interventions in the flowsheet.   Outcome: Progressing Towards Goal  Note: Fall Risk Interventions:            Medication Interventions: Bed/chair exit alarm, Evaluate medications/consider consulting pharmacy, Patient to call before getting OOB, Teach patient to arise slowly    Elimination Interventions: Bed/chair exit alarm, Call light in reach, Elevated toilet seat, Patient to call for help with toileting needs, Stay With Me (per policy), Toilet paper/wipes in reach, Toileting schedule/hourly rounds

## 2020-08-17 NOTE — PROGRESS NOTES
Pharmacy Medication Reconciliation     The patient was interviewed regarding current PTA medication list, use and drug allergies;  patient present in room and obtained permission from patient to discuss drug regimen. The patient was questioned regarding use of any other inhalers, topical products, over the counter medications, herbal medications, vitamin products or ophthalmic/nasal/otic medication use. Allergy Update: Patient has no known allergies. Recommendations/Findings: The following amendments were made to the patient's active medication list on file at [address]:   1) Additions:   +ibuprofen  +novolog sliding scale  +prednisone  +Trelegy 1 puff daily    2) Deletions:   -advair --switched to trelegy  -potassium chloride - not a current med    3) Changes:   (Old regimen: cyclobenzaprine 5mg bid prn muscle spasm /New regimen: cyclobenzaprine 5mg bid)    Pertinent Findings:   -patient doesn't recall taking potassium supplement any longer, not a current med    -Clarified PTA med list with Patient interview, Rx query. PTA medication list was corrected to the following:     Prior to Admission Medications   Prescriptions Last Dose Informant Taking? NovoLOG Flexpen U-100 Insulin 100 unit/mL (3 mL) inpn  Self Yes   Si-10 Units by SubCUTAneous route as directed. Sliding scale: -300: 5 units -400 units: 10 units   albuterol (VENTOLIN HFA) 90 mcg/actuation inhaler 2020 at Unknown time Self Yes   Sig: Take 2 Puffs by inhalation every six (6) hours as needed for Wheezing. albuterol-ipratropium (DUO-NEB) 2.5 mg-0.5 mg/3 ml nebu 2020 at Unknown time Self Yes   Sig: 3 mL by Nebulization route four (4) times daily. ammonium lactate (LAC-HYDRIN) 12 % lotion 2020 at Unknown time Self Yes   Sig: Apply 1 Applicator to affected area as needed. rub in to affected area well   aspirin 81 mg chewable tablet 2020 at Unknown time Self Yes   Sig: Take 81 mg by mouth daily.    cyclobenzaprine (FLEXERIL) 5 mg tablet 2020 at 2100 Self Yes   Sig: Take 5 mg by mouth two (2) times a day. fluticasone (FLONASE) 50 mcg/actuation nasal spray 2020 at Unknown time Self Yes   Si Sprays by Both Nostrils route daily. fluticasone-umeclidinium-vilanterol (Trelegy Ellipta) 100-62.5-25 mcg inhaler 2020 at 0900 Self Yes   Sig: Take 1 Puff by inhalation daily. furosemide (LASIX) 40 mg tablet 2020 at Unknown time Self Yes   Sig: Take 40 mg by mouth two (2) times a day. glyBURIDE (DIABETA) 5 mg tablet 2020 at Unknown time Self Yes   Sig: Take 5 mg by mouth two (2) times daily (with meals). ibuprofen (MOTRIN) 800 mg tablet  Self Yes   Sig: Take 800 mg by mouth three (3) times daily. insulin glargine (LANTUS) 100 unit/mL injection 2020 at Unknown time Self Yes   Si Units by SubCUTAneous route daily. loratadine (CLARITIN) 10 mg tablet 2020 at Unknown time Self Yes   Sig: Take 10 mg by mouth daily. lovastatin (MEVACOR) 40 mg tablet 2020 at 2100 Self Yes   Sig: Take 1 Tab by mouth nightly. metoprolol tartrate (LOPRESSOR) 25 mg tablet 2020 at Unknown time Self Yes   Sig: Take 25 mg by mouth two (2) times a day. nitroglycerin (NITROSTAT) 0.4 mg SL tablet 2020 at Unknown time Self Yes   Si Tab by SubLINGual route every five (5) minutes as needed for Chest Pain (call 911 if not relieved by 3). nystatin (MYCOSTATIN) topical cream 2020 at Unknown time Self Yes   Sig: Apply  to affected area two (2) times a day. omeprazole (PRILOSEC) 40 mg capsule 8/10/2020 at Unknown time Self Yes   Sig: Take 40 mg by mouth daily. predniSONE (DELTASONE) 20 mg tablet 2020 at Unknown time Self Yes   Sig: Take 60 mg by mouth daily.       Facility-Administered Medications: None          Thank you,  Beatriz Moritz, [facility]

## 2020-08-17 NOTE — CONSULTS
[facility]  Pulmonary, Critical Care, and Sleep Medicine    Initial Patient Consult    Name: Kiki Mejia MRN: [id number]   : 1970 Hospital: [facility]   Date: 2020        IMPRESSION:   · Acute on chronic respiratory failure  · COPD  · Morbid obesity, BMI 67!!  · OHS  · GEN  · Volume overload      RECOMMENDATIONS:   · On trilogy at home???, use bipap here, adjusted settings in the ER  · This pt has never been seen by PAR as an out pt, so not sure who is guiding her care in regards of her trilogy  · Taper steroids rapidly  · Jet nebs  · Diurese, added diamox x 3 days  · Glycemic control  · DVT prophylaxis  · Palliative care consult needed     Subjective: This patient has been seen and evaluated at the request of Dr. Divine Rosales for worsening respiratory failure. Patient is a 52 y.o. femalemorbidly obese, on trilogy at home, not followed by PAR as out pt  Presented with worsening sob  Started on bipap, jet nebs, iv steroids  Today pt awake, alert, on bipap       Past Medical History:   Diagnosis Date    Arthritis     Asthma     Breast lump     CAD (coronary artery disease)     Cancer ([address]     breast cancer    Congestive heart failure ([address]     COPD (chronic obstructive pulmonary disease) ([address]     Diabetes ([address]     Hypertension     Morbid obesity with BMI of 70 and over, adult ([address]     NSTEMI (non-ST elevated myocardial infarction) ([address]     NSTEMI (non-ST elevated myocardial infarction) ([address] 2012    SVT (supraventricular tachycardia) ([facility])       Past Surgical History:   Procedure Laterality Date    CARDIAC SURG PROCEDURE UNLIST      Stents    HX  SECTION      HX ORTHOPAEDIC      HX OTHER SURGICAL      cyst removed from back      Prior to Admission medications    Medication Sig Start Date End Date Taking? Authorizing Provider   omeprazole (PRILOSEC) 40 mg capsule Take 40 mg by mouth daily.    Yes Provider, Historical   nystatin (MYCOSTATIN) topical cream Apply  to affected area two (2) times a day. 9/16/19  Yes Emmie Nicole MD   cyclobenzaprine (FLEXERIL) 5 mg tablet Take 1 Tab by mouth two (2) times daily as needed for Muscle Spasm(s). flexeriol 3/17/19  Yes Astrid Almazan NP   albuterol-ipratropium (DUO-NEB) 2.5 mg-0.5 mg/3 ml nebu 3 mL by Nebulization route four (4) times daily. 3/3/19  Yes Akira Polanco MD   fluticasone (FLONASE) 50 mcg/actuation nasal spray 2 Sprays by Both Nostrils route daily. 1/6/19  Yes Richard Saul MD   insulin glargine (LANTUS) 100 unit/mL injection 35 Units by SubCUTAneous route daily. Yes Provider, Historical   metoprolol tartrate (LOPRESSOR) 25 mg tablet Take 25 mg by mouth two (2) times a day. Yes Provider, Historical   fluticasone-salmeterol (ADVAIR DISKUS) 500-50 mcg/dose diskus inhaler Take 1 Puff by inhalation daily. Yes Provider, Historical   loratadine (CLARITIN) 10 mg tablet Take 10 mg by mouth daily. Yes Provider, Historical   albuterol (VENTOLIN HFA) 90 mcg/actuation inhaler Take 2 Puffs by inhalation every six (6) hours as needed for Wheezing. Yes Provider, Historical   aspirin 81 mg chewable tablet Take 81 mg by mouth daily. Yes Provider, Historical   lovastatin (MEVACOR) 40 mg tablet Take 1 Tab by mouth nightly. 1/14/16  Yes Jorge Russell NP   glyBURIDE (DIABETA) 5 mg tablet Take 5 mg by mouth two (2) times daily (with meals). Yes Provider, Historical   nitroglycerin (NITROSTAT) 0.4 mg SL tablet 1 Tab by SubLINGual route every five (5) minutes as needed for Chest Pain (call 911 if not relieved by 3). 9/12/13  Yes Temo SIMPSON NP   ammonium lactate (LAC-HYDRIN) 12 % lotion Apply 1 Applicator to affected area as needed. rub in to affected area well    Provider, Historical   potassium chloride SR (KLOR-CON 10) 10 mEq tablet Take 10 mEq by mouth four (4) times daily. Provider, Historical   furosemide (LASIX) 40 mg tablet Take 40 mg by mouth two (2) times a day. Other, MD Landon     No Known Allergies   Social History     Tobacco Use    Smoking status: Former Smoker     Packs/day: 0.25     Types: Cigarettes    Smokeless tobacco: Never Used    Tobacco comment: Patient states \"I  aint smoking no more d*mn cigarettes after yesterday\" 01/26/2018   Substance Use Topics    Alcohol use: No      Family History   Problem Relation Age of Onset    Heart Disease Mother     Heart Disease Father     Heart Disease Sister     Breast Cancer Maternal Grandmother     Breast Cancer Paternal Grandmother         Current Facility-Administered Medications   Medication Dose Route Frequency    sodium chloride (NS) flush 5-40 mL  5-40 mL IntraVENous Q8H    sodium chloride (NS) flush 5-40 mL  5-40 mL IntraVENous Q8H    enoxaparin (LOVENOX) injection 40 mg  40 mg SubCUTAneous Q12H    insulin lispro (HUMALOG) injection   SubCUTAneous Q6H    aspirin chewable tablet 81 mg  81 mg Oral DAILY    fluticasone propionate (FLONASE) 50 mcg/actuation nasal spray 2 Spray  2 Spray Both Nostrils DAILY    furosemide (LASIX) tablet 40 mg  40 mg Oral BID    insulin glargine (LANTUS) injection 20 Units  20 Units SubCUTAneous DAILY    loratadine (CLARITIN) tablet 10 mg  10 mg Oral DAILY    atorvastatin (LIPITOR) tablet 10 mg  10 mg Oral QHS    metoprolol tartrate (LOPRESSOR) tablet 25 mg  25 mg Oral BID    nystatin (MYCOSTATIN) 100,000 unit/gram cream   Topical BID    pantoprazole (PROTONIX) tablet 40 mg  40 mg Oral ACB    potassium chloride SR (KLOR-CON 10) tablet 10 mEq  10 mEq Oral QID    insulin NPH (NOVOLIN N, HUMULIN N) injection 8 Units  8 Units SubCUTAneous ACB&D    And    methylPREDNISolone (PF) (SOLU-MEDROL) injection 40 mg  40 mg IntraVENous Q12H    budesonide (PULMICORT) 250 mcg/2ml nebulizer susp  250 mcg Nebulization BID RT    arformoteroL (BROVANA) neb solution 15 mcg  15 mcg Nebulization BID RT    albuterol-ipratropium (DUO-NEB) 2.5 MG-0.5 MG/3 ML  3 mL Nebulization QID RT    acetaZOLAMIDE (DIAMOX) 500 mg in sterile water (preservative free) 5 mL injection  500 mg IntraVENous Q24H       Review of Systems:  Review of systems not obtained due to patient factors. Objective:   Vital Signs:    Visit Vitals  /78   Pulse 78   Temp (!) 96.7 °F (35.9 °C)   Resp 18   Ht 5' 7\" (1.702 m)   Wt (!) 194.6 kg (429 lb)   SpO2 (!) 89%   BMI 67.19 kg/m²       O2 Device: BIPAP   O2 Flow Rate (L/min): 4 l/min   Temp (24hrs), Av.7 °F (36.5 °C), Min:96.7 °F (35.9 °C), Max:98.6 °F (37 °C)       Intake/Output:   Last shift:      No intake/output data recorded. Last 3 shifts: No intake/output data recorded. No intake or output data in the 24 hours ending 20 1056   Physical Exam:   General:  Alert, cooperative, no distress, appears stated age. Head:  Normocephalic, without obvious abnormality, atraumatic. Eyes:  Conjunctivae/corneas clear. PERRL, EOMs intact. Nose: bipap mask in place. Throat: biapa mask in place. Neck: Supple, symmetrical, trachea midline, no adenopathy, thyroid: no enlargment/tenderness/nodules, no carotid bruit and no JVD. Back:   Symmetric, no curvature. ROM normal.   Lungs:   Clear to auscultation bilaterally. Chest wall:  No tenderness or deformity. Heart:  Regular rate and rhythm, S1, S2 normal, no murmur, click, rub or gallop. Abdomen:   Soft, non-tender. Bowel sounds normal. No masses,  No organomegaly. Extremities: Extremities normal, atraumatic, no cyanosis, ++++ edema. Pulses: 2+ and symmetric all extremities.    Skin: Skin color, texture, turgor normal. No rashes or lesions   Lymph nodes: Cervical, supraclavicular, and axillary nodes normal.   Neurologic: Grossly nonfocal     Data review:     Recent Results (from the past 24 hour(s))   EKG, 12 LEAD, INITIAL    Collection Time: 20 12:38 AM   Result Value Ref Range    Ventricular Rate 78 BPM    Atrial Rate 78 BPM    P-R Interval 188 ms    QRS Duration 88 ms    Q-T Interval 370 ms    QTC Calculation (Bezet) 421 ms    Calculated P Axis 31 degrees    Calculated R Axis 68 degrees    Calculated T Axis 46 degrees    Diagnosis       Sinus rhythm with premature atrial complexes premature ventricular complexes  Low voltage QRS     Confirmed by Shane Theodore M.D. ([id number]) on 8/17/2020 9:11:20 AM     CBC WITH AUTOMATED DIFF    Collection Time: 08/17/20 12:45 AM   Result Value Ref Range    WBC 11.4 (H) 3.6 - 11.0 K/uL    RBC 4.85 3.80 - 5.20 M/uL    HGB 13.6 11.5 - 16.0 g/dL    HCT 46.7 35.0 - 47.0 %    MCV 96.3 80.0 - 99.0 FL    MCH 28.0 26.0 - 34.0 PG    MCHC 29.1 (L) 30.0 - 36.5 g/dL    RDW 13.2 11.5 - 14.5 %    PLATELET 929 000 - 769 K/uL    MPV 11.0 8.9 - 12.9 FL    NRBC 0.0 0  WBC    ABSOLUTE NRBC 0.00 0.00 - 0.01 K/uL    NEUTROPHILS 95 (H) 32 - 75 %    LYMPHOCYTES 4 (L) 12 - 49 %    MONOCYTES 1 (L) 5 - 13 %    EOSINOPHILS 0 0 - 7 %    BASOPHILS 0 0 - 1 %    IMMATURE GRANULOCYTES 0 0.0 - 0.5 %    ABS. NEUTROPHILS 10.8 (H) 1.8 - 8.0 K/UL    ABS. LYMPHOCYTES 0.5 (L) 0.8 - 3.5 K/UL    ABS. MONOCYTES 0.1 0.0 - 1.0 K/UL    ABS. EOSINOPHILS 0.0 0.0 - 0.4 K/UL    ABS. BASOPHILS 0.0 0.0 - 0.1 K/UL    ABS. IMM. GRANS. 0.0 0.00 - 0.04 K/UL    DF SMEAR SCANNED      RBC COMMENTS NORMOCYTIC, NORMOCHROMIC     METABOLIC PANEL, COMPREHENSIVE    Collection Time: 08/17/20 12:45 AM   Result Value Ref Range    Sodium 138 136 - 145 mmol/L    Potassium 4.5 3.5 - 5.1 mmol/L    Chloride 103 97 - 108 mmol/L    CO2 35 (H) 21 - 32 mmol/L    Anion gap 0 (L) 5 - 15 mmol/L    Glucose 233 (H) 65 - 100 mg/dL    BUN 19 6 - 20 MG/DL    Creatinine 0.71 0.55 - 1.02 MG/DL    BUN/Creatinine ratio 27 (H) 12 - 20      GFR est AA >60 >60 ml/min/1.73m2    GFR est non-AA >60 >60 ml/min/1.73m2    Calcium 10.2 (H) 8.5 - 10.1 MG/DL    Bilirubin, total 0.3 0.2 - 1.0 MG/DL    ALT (SGPT) 18 12 - 78 U/L    AST (SGOT) 12 (L) 15 - 37 U/L    Alk.  phosphatase 102 45 - 117 U/L    Protein, total 8.4 (H) 6.4 - 8.2 g/dL    Albumin 3.5 3.5 - 5.0 g/dL Globulin 4.9 (H) 2.0 - 4.0 g/dL    A-G Ratio 0.7 (L) 1.1 - 2.2     NT-PRO BNP    Collection Time: 08/17/20 12:45 AM   Result Value Ref Range    NT pro- (H) <125 PG/ML   CK W/ CKMB & INDEX    Collection Time: 08/17/20 12:45 AM   Result Value Ref Range    CK - MB 1.5 <3.6 NG/ML    CK-MB Index 5.4 (H) 0.0 - 2.5      CK 28 26 - 192 U/L   TROPONIN I    Collection Time: 08/17/20 12:45 AM   Result Value Ref Range    Troponin-I, Qt. <0.05 <0.05 ng/mL   POC EG7    Collection Time: 08/17/20  1:04 AM   Result Value Ref Range    Calcium, ionized (POC) 1.43 (H) 1.12 - 1.32 mmol/L    pH (POC) 7.28 (L) 7.35 - 7.45      pCO2 (POC) 77.0 (H) 35.0 - 45.0 MMHG    pO2 (POC) 41 (LL) 80 - 100 MMHG    HCO3 (POC) 36.0 (H) 22 - 26 MMOL/L    Base excess (POC) 9 mmol/L    sO2 (POC) 67 (L) 92 - 97 %    Site OTHER      Device: NASAL CANNULA      Flow rate (POC) 4 L/M    Allens test (POC) N/A      Specimen type (POC) VENOUS BLOOD      Total resp. rate 20     POC EG7    Collection Time: 08/17/20  5:11 AM   Result Value Ref Range    Calcium, ionized (POC) 1.45 (H) 1.12 - 1.32 mmol/L    FIO2 (POC) 100 %    pH (POC) 7.27 (L) 7.35 - 7.45      pCO2 (POC) 82.3 (H) 35.0 - 45.0 MMHG    pO2 (POC) 44 (LL) 80 - 100 MMHG    HCO3 (POC) 37.7 (H) 22 - 26 MMOL/L    Base excess (POC) 11 mmol/L    sO2 (POC) 70 (L) 92 - 97 %    Site OTHER      Device: BIPAP      PEEP/CPAP (POC) 12 cmH2O    PIP (POC) 21      Allens test (POC) N/A      Specimen type (POC) VENOUS BLOOD      Total resp.  rate 22     POC EG7    Collection Time: 08/17/20  6:26 AM   Result Value Ref Range    Calcium, ionized (POC) 1.42 (H) 1.12 - 1.32 mmol/L    FIO2 (POC) 100 %    pH (POC) 7.29 (L) 7.35 - 7.45      pCO2 (POC) 74.5 (H) 35.0 - 45.0 MMHG    pO2 (POC) 89 80 - 100 MMHG    HCO3 (POC) 35.9 (H) 22 - 26 MMOL/L    Base excess (POC) 9 mmol/L    sO2 (POC) 95 92 - 97 %    Site OTHER      Device: BIPAP      PEEP/CPAP (POC) 12 cmH2O    Allens test (POC) N/A      Specimen type (POC) VENOUS BLOOD Total resp.  rate 34     GLUCOSE, POC    Collection Time: 08/17/20  8:22 AM   Result Value Ref Range    Glucose (POC) 148 (H) 65 - 100 mg/dL    Performed by Yemi Jones    SARS-COV-2    Collection Time: 08/17/20  8:25 AM   Result Value Ref Range    Specimen source Nasopharyngeal      Specimen source Nasopharyngeal      COVID-19 rapid test Not detected NOTD      Specimen type NP Swab      Health status Symptomatic Testing     DRUG SCREEN, URINE    Collection Time: 08/17/20  8:42 AM   Result Value Ref Range    AMPHETAMINES Negative NEG      BARBITURATES Negative NEG      BENZODIAZEPINES Negative NEG      COCAINE Negative NEG      METHADONE Negative NEG      OPIATES Negative NEG      PCP(PHENCYCLIDINE) Negative NEG      THC (TH-CANNABINOL) Negative NEG      Drug screen comment (NOTE)    URINALYSIS W/ REFLEX CULTURE    Collection Time: 08/17/20  8:42 AM    Specimen: Urine   Result Value Ref Range    Color YELLOW/STRAW      Appearance CLEAR CLEAR      Specific gravity 1.024 1.003 - 1.030      pH (UA) 5.5 5.0 - 8.0      Protein Negative NEG mg/dL    Glucose Negative NEG mg/dL    Ketone Negative NEG mg/dL    Bilirubin Negative NEG      Blood SMALL (A) NEG      Urobilinogen 1.0 0.2 - 1.0 EU/dL    Nitrites Negative NEG      Leukocyte Esterase Negative NEG      WBC 0-4 0 - 4 /hpf    RBC 5-10 0 - 5 /hpf    Epithelial cells MODERATE (A) FEW /lpf    Bacteria Negative NEG /hpf    UA:UC IF INDICATED CULTURE NOT INDICATED BY UA RESULT CNI      Mucus 1+ (A) NEG /lpf    Hyaline cast 2-5 0 - 5 /lpf       Imaging:  I have personally reviewed the patients radiographs and have reviewed the reports:  CXR: clear        Kaylene Rincon MD

## 2020-08-17 NOTE — ED NOTES
TRANSFER - IN REPORT:    Verbal report received from Jami on [facility]. Report consisted of patients Situation, Background, Assessment and   Recommendations(SBAR). Information from the following report(s) SBAR and ED Summary was reviewed with the receiving nurse. Opportunity for questions and clarification was provided. RT called for breathing tx and ABG. RT at bedside. Pt remains on BIPAP and decreased O2 to 50 % per RT. Pt resting on stretcher in POC with call bell in reach. Pt remains on monitor x 3. VSS at this time. 0999  Pt's Rapid COVID test is negative. Per Dr Angela Murphy we will not be doing further testing and isolation can be cancelled. [id number] Dr Angela Murphy at bedside evaluating pt. RT decreased O2 to 30%. Pt assisted to bedside commode without difficulty. [id number] Pt has NPO order in and PO meds ordered. Paged Dr Angela Murphy and will hold PO meds until order is clarified.

## 2020-08-18 ENCOUNTER — VIRTUAL VISIT (OUTPATIENT)
Dept: SLEEP MEDICINE | Age: 50
End: 2020-08-18
Payer: MEDICARE

## 2020-08-18 DIAGNOSIS — E66.2 HYPOVENTILATION ASSOCIATED WITH OBESITY (HCC): ICD-10-CM

## 2020-08-18 DIAGNOSIS — G47.33 OSA (OBSTRUCTIVE SLEEP APNEA): Primary | ICD-10-CM

## 2020-08-18 DIAGNOSIS — J44.9 CHRONIC OBSTRUCTIVE PULMONARY DISEASE, UNSPECIFIED COPD TYPE (HCC): ICD-10-CM

## 2020-08-18 DIAGNOSIS — Z78.9 ON SUPPLEMENTAL OXYGEN BY NASAL CANNULA: ICD-10-CM

## 2020-08-18 LAB
ANION GAP SERPL CALC-SCNC: 0 MMOL/L (ref 5–15)
BUN SERPL-MCNC: 23 MG/DL (ref 6–20)
BUN/CREAT SERPL: 25 (ref 12–20)
CALCIUM SERPL-MCNC: 9.9 MG/DL (ref 8.5–10.1)
CHLORIDE SERPL-SCNC: 103 MMOL/L (ref 97–108)
CO2 SERPL-SCNC: 34 MMOL/L (ref 21–32)
CREAT SERPL-MCNC: 0.92 MG/DL (ref 0.55–1.02)
ERYTHROCYTE [DISTWIDTH] IN BLOOD BY AUTOMATED COUNT: 13.3 % (ref 11.5–14.5)
GLUCOSE BLD STRIP.AUTO-MCNC: 185 MG/DL (ref 65–100)
GLUCOSE BLD STRIP.AUTO-MCNC: 191 MG/DL (ref 65–100)
GLUCOSE BLD STRIP.AUTO-MCNC: 191 MG/DL (ref 65–100)
GLUCOSE BLD STRIP.AUTO-MCNC: 199 MG/DL (ref 65–100)
GLUCOSE SERPL-MCNC: 223 MG/DL (ref 65–100)
HCT VFR BLD AUTO: 43.9 % (ref 35–47)
HGB BLD-MCNC: 12.5 G/DL (ref 11.5–16)
MCH RBC QN AUTO: 27.4 PG (ref 26–34)
MCHC RBC AUTO-ENTMCNC: 28.5 G/DL (ref 30–36.5)
MCV RBC AUTO: 96.1 FL (ref 80–99)
NRBC # BLD: 0 K/UL (ref 0–0.01)
NRBC BLD-RTO: 0 PER 100 WBC
PLATELET # BLD AUTO: 192 K/UL (ref 150–400)
PMV BLD AUTO: 11.4 FL (ref 8.9–12.9)
POTASSIUM SERPL-SCNC: 4.1 MMOL/L (ref 3.5–5.1)
RBC # BLD AUTO: 4.57 M/UL (ref 3.8–5.2)
SERVICE CMNT-IMP: ABNORMAL
SODIUM SERPL-SCNC: 137 MMOL/L (ref 136–145)
WBC # BLD AUTO: 15 K/UL (ref 3.6–11)

## 2020-08-18 PROCEDURE — 77010033678 HC OXYGEN DAILY

## 2020-08-18 PROCEDURE — 74011250636 HC RX REV CODE- 250/636: Performed by: INTERNAL MEDICINE

## 2020-08-18 PROCEDURE — 74011250637 HC RX REV CODE- 250/637: Performed by: INTERNAL MEDICINE

## 2020-08-18 PROCEDURE — 85027 COMPLETE CBC AUTOMATED: CPT

## 2020-08-18 PROCEDURE — 74011000250 HC RX REV CODE- 250: Performed by: INTERNAL MEDICINE

## 2020-08-18 PROCEDURE — 99442 PR PHYS/QHP TELEPHONE EVALUATION 11-20 MIN: CPT | Performed by: INTERNAL MEDICINE

## 2020-08-18 PROCEDURE — 36415 COLL VENOUS BLD VENIPUNCTURE: CPT

## 2020-08-18 PROCEDURE — 74011636637 HC RX REV CODE- 636/637: Performed by: INTERNAL MEDICINE

## 2020-08-18 PROCEDURE — 80048 BASIC METABOLIC PNL TOTAL CA: CPT

## 2020-08-18 PROCEDURE — 82962 GLUCOSE BLOOD TEST: CPT

## 2020-08-18 PROCEDURE — 94640 AIRWAY INHALATION TREATMENT: CPT

## 2020-08-18 PROCEDURE — 65660000001 HC RM ICU INTERMED STEPDOWN

## 2020-08-18 PROCEDURE — 94660 CPAP INITIATION&MGMT: CPT

## 2020-08-18 RX ORDER — INSULIN LISPRO 100 [IU]/ML
INJECTION, SOLUTION INTRAVENOUS; SUBCUTANEOUS
Status: DISCONTINUED | OUTPATIENT
Start: 2020-08-18 | End: 2020-08-20 | Stop reason: HOSPADM

## 2020-08-18 RX ADMIN — METOPROLOL TARTRATE 25 MG: 25 TABLET, FILM COATED ORAL at 17:45

## 2020-08-18 RX ADMIN — ATORVASTATIN CALCIUM 10 MG: 10 TABLET, FILM COATED ORAL at 21:12

## 2020-08-18 RX ADMIN — LORATADINE 10 MG: 10 TABLET ORAL at 09:31

## 2020-08-18 RX ADMIN — ARFORMOTEROL TARTRATE 15 MCG: 15 SOLUTION RESPIRATORY (INHALATION) at 20:14

## 2020-08-18 RX ADMIN — NYSTATIN: 100000 CREAM TOPICAL at 12:50

## 2020-08-18 RX ADMIN — METHYLPREDNISOLONE SODIUM SUCCINATE 40 MG: 40 INJECTION, POWDER, FOR SOLUTION INTRAMUSCULAR; INTRAVENOUS at 09:31

## 2020-08-18 RX ADMIN — HUMAN INSULIN 8 UNITS: 100 INJECTION, SUSPENSION SUBCUTANEOUS at 09:32

## 2020-08-18 RX ADMIN — ENOXAPARIN SODIUM 40 MG: 40 INJECTION SUBCUTANEOUS at 09:31

## 2020-08-18 RX ADMIN — FUROSEMIDE 40 MG: 40 TABLET ORAL at 09:31

## 2020-08-18 RX ADMIN — IPRATROPIUM BROMIDE AND ALBUTEROL SULFATE 3 ML: .5; 3 SOLUTION RESPIRATORY (INHALATION) at 08:15

## 2020-08-18 RX ADMIN — BUDESONIDE 250 MCG: 0.25 INHALANT RESPIRATORY (INHALATION) at 20:14

## 2020-08-18 RX ADMIN — INSULIN LISPRO 2 UNITS: 100 INJECTION, SOLUTION INTRAVENOUS; SUBCUTANEOUS at 17:43

## 2020-08-18 RX ADMIN — METHYLPREDNISOLONE SODIUM SUCCINATE 40 MG: 40 INJECTION, POWDER, FOR SOLUTION INTRAMUSCULAR; INTRAVENOUS at 17:44

## 2020-08-18 RX ADMIN — PANTOPRAZOLE SODIUM 40 MG: 40 TABLET, DELAYED RELEASE ORAL at 09:31

## 2020-08-18 RX ADMIN — INSULIN LISPRO 2 UNITS: 100 INJECTION, SOLUTION INTRAVENOUS; SUBCUTANEOUS at 05:47

## 2020-08-18 RX ADMIN — FUROSEMIDE 40 MG: 40 TABLET ORAL at 17:44

## 2020-08-18 RX ADMIN — ENOXAPARIN SODIUM 40 MG: 40 INJECTION SUBCUTANEOUS at 21:11

## 2020-08-18 RX ADMIN — ARFORMOTEROL TARTRATE 15 MCG: 15 SOLUTION RESPIRATORY (INHALATION) at 08:57

## 2020-08-18 RX ADMIN — BUDESONIDE 250 MCG: 0.25 INHALANT RESPIRATORY (INHALATION) at 08:57

## 2020-08-18 RX ADMIN — Medication 10 ML: at 14:00

## 2020-08-18 RX ADMIN — Medication 10 ML: at 05:48

## 2020-08-18 RX ADMIN — METOPROLOL TARTRATE 25 MG: 25 TABLET, FILM COATED ORAL at 09:31

## 2020-08-18 RX ADMIN — INSULIN GLARGINE 20 UNITS: 100 INJECTION, SOLUTION SUBCUTANEOUS at 09:32

## 2020-08-18 RX ADMIN — IPRATROPIUM BROMIDE AND ALBUTEROL SULFATE 3 ML: .5; 3 SOLUTION RESPIRATORY (INHALATION) at 11:45

## 2020-08-18 RX ADMIN — ASPIRIN 81 MG CHEWABLE TABLET 81 MG: 81 TABLET CHEWABLE at 09:31

## 2020-08-18 RX ADMIN — HUMAN INSULIN 8 UNITS: 100 INJECTION, SUSPENSION SUBCUTANEOUS at 17:44

## 2020-08-18 RX ADMIN — FLUTICASONE PROPIONATE 2 SPRAY: 50 SPRAY, METERED NASAL at 09:33

## 2020-08-18 RX ADMIN — Medication 10 ML: at 21:12

## 2020-08-18 RX ADMIN — ACETAZOLAMIDE SODIUM 500 MG: 500 INJECTION, POWDER, LYOPHILIZED, FOR SOLUTION INTRAVENOUS at 12:49

## 2020-08-18 RX ADMIN — IPRATROPIUM BROMIDE AND ALBUTEROL SULFATE 3 ML: .5; 3 SOLUTION RESPIRATORY (INHALATION) at 20:09

## 2020-08-18 RX ADMIN — INSULIN LISPRO 2 UNITS: 100 INJECTION, SOLUTION INTRAVENOUS; SUBCUTANEOUS at 12:49

## 2020-08-18 RX ADMIN — NYSTATIN: 100000 CREAM TOPICAL at 17:45

## 2020-08-18 RX ADMIN — CYCLOBENZAPRINE 5 MG: 10 TABLET, FILM COATED ORAL at 09:39

## 2020-08-18 NOTE — PROGRESS NOTES
[facility]  Pulmonary, Critical Care, and Sleep Medicine        Name: Soumya Bucio MRN: [id number]   : 1970 Hospital: [facility]   Date: 2020        IMPRESSION:   · Acute on chronic respiratory failure  · COPD  · Morbid obesity, BMI 67!!  · OHS  · GEN  · Volume overload      RECOMMENDATIONS:   · On trilogy at home???, using bipap here, this is managed by [facility]!!  · This pt has never been seen by PAR as an out pt  · Taper steroids rapidly  · Jet nebs  · Diurese, added diamox x 3 days  · Glycemic control  · DVT prophylaxis  · Palliative care consult needed  · Can follow as out pt with [facility] to continue to manage her NIV as before  · Will sign off     Subjective:     No acute events overnight  Used bipap last night  Off this am   No acute distress      Current Facility-Administered Medications   Medication Dose Route Frequency    sodium chloride (NS) flush 5-40 mL  5-40 mL IntraVENous Q8H    sodium chloride (NS) flush 5-40 mL  5-40 mL IntraVENous Q8H    enoxaparin (LOVENOX) injection 40 mg  40 mg SubCUTAneous Q12H    insulin lispro (HUMALOG) injection   SubCUTAneous Q6H    aspirin chewable tablet 81 mg  81 mg Oral DAILY    fluticasone propionate (FLONASE) 50 mcg/actuation nasal spray 2 Spray  2 Spray Both Nostrils DAILY    furosemide (LASIX) tablet 40 mg  40 mg Oral BID    insulin glargine (LANTUS) injection 20 Units  20 Units SubCUTAneous DAILY    loratadine (CLARITIN) tablet 10 mg  10 mg Oral DAILY    atorvastatin (LIPITOR) tablet 10 mg  10 mg Oral QHS    metoprolol tartrate (LOPRESSOR) tablet 25 mg  25 mg Oral BID    nystatin (MYCOSTATIN) 100,000 unit/gram cream   Topical BID    pantoprazole (PROTONIX) tablet 40 mg  40 mg Oral ACB    insulin NPH (NOVOLIN N, HUMULIN N) injection 8 Units  8 Units SubCUTAneous ACB&D    And    methylPREDNISolone (PF) (SOLU-MEDROL) injection 40 mg  40 mg IntraVENous Q12H    budesonide (PULMICORT) 250 mcg/2ml nebulizer susp  250 mcg Nebulization BID RT    arformoteroL (BROVANA) neb solution 15 mcg  15 mcg Nebulization BID RT    albuterol-ipratropium (DUO-NEB) 2.5 MG-0.5 MG/3 ML  3 mL Nebulization QID RT    acetaZOLAMIDE (DIAMOX) 500 mg in sterile water (preservative free) 5 mL injection  500 mg IntraVENous Q24H       Review of Systems:  Review of systems not obtained due to patient factors. Objective:   Vital Signs:    Visit Vitals  /59 (BP 1 Location: Right arm, BP Patient Position: At rest;Lying left side)   Pulse 69   Temp 97.5 °F (36.4 °C)   Resp 26   Ht 5' 7\" (1.702 m)   Wt (!) 183.3 kg (404 lb)   SpO2 95%   BMI 63.28 kg/m²       O2 Device: Nasal cannula   O2 Flow Rate (L/min): 6 l/min   Temp (24hrs), Av °F (36.7 °C), Min:97.5 °F (36.4 °C), Max:98.3 °F (36.8 °C)       Intake/Output:   Last shift:      No intake/output data recorded. Last 3 shifts:  1901 -  0700  In: 920 [P.O.:920]  Out: 1200 [Urine:1200]    Intake/Output Summary (Last 24 hours) at 2020 0856  Last data filed at 2020 0306  Gross per 24 hour   Intake 920 ml   Output 1200 ml   Net -280 ml      Physical Exam:   General:  Alert, cooperative, no distress, appears stated age. Head:  Normocephalic, without obvious abnormality, atraumatic. Eyes:  Conjunctivae/corneas clear. PERRL, EOMs intact. Nose: Clear . Throat: Clear. Neck: Supple, symmetrical, trachea midline, no adenopathy, thyroid: no enlargment/tenderness/nodules, no carotid bruit and no JVD. Back:   Symmetric, no curvature. ROM normal.   Lungs:   Clear to auscultation bilaterally. Chest wall:  No tenderness or deformity. Heart:  Regular rate and rhythm, S1, S2 normal, no murmur, click, rub or gallop. Abdomen:   Soft, non-tender. Bowel sounds normal. No masses,  No organomegaly. Extremities: Extremities normal, atraumatic, no cyanosis, ++++ edema. Pulses: 2+ and symmetric all extremities.    Skin: Skin color, texture, turgor normal. No rashes or lesions   Lymph nodes: Cervical, supraclavicular, and axillary nodes normal.   Neurologic: Grossly nonfocal     Data review:     Recent Results (from the past 24 hour(s))   GLUCOSE, POC    Collection Time: 08/17/20 12:22 PM   Result Value Ref Range    Glucose (POC) 147 (H) 65 - 100 mg/dL    Performed by Indira Martin    GLUCOSE, POC    Collection Time: 08/17/20  3:49 PM   Result Value Ref Range    Glucose (POC) 195 (H) 65 - 100 mg/dL    Performed by Kathy Mccormick    GLUCOSE, POC    Collection Time: 08/17/20  6:01 PM   Result Value Ref Range    Glucose (POC) 289 (H) 65 - 100 mg/dL    Performed by Araceli Gerber    GLUCOSE, POC    Collection Time: 08/17/20 11:50 PM   Result Value Ref Range    Glucose (POC) 269 (H) 65 - 100 mg/dL    Performed by Jorge Gonzalez PCT    METABOLIC PANEL, BASIC    Collection Time: 08/18/20  2:32 AM   Result Value Ref Range    Sodium 137 136 - 145 mmol/L    Potassium 4.1 3.5 - 5.1 mmol/L    Chloride 103 97 - 108 mmol/L    CO2 34 (H) 21 - 32 mmol/L    Anion gap 0 (L) 5 - 15 mmol/L    Glucose 223 (H) 65 - 100 mg/dL    BUN 23 (H) 6 - 20 MG/DL    Creatinine 0.92 0.55 - 1.02 MG/DL    BUN/Creatinine ratio 25 (H) 12 - 20      GFR est AA >60 >60 ml/min/1.73m2    GFR est non-AA >60 >60 ml/min/1.73m2    Calcium 9.9 8.5 - 10.1 MG/DL   CBC W/O DIFF    Collection Time: 08/18/20  2:32 AM   Result Value Ref Range    WBC 15.0 (H) 3.6 - 11.0 K/uL    RBC 4.57 3.80 - 5.20 M/uL    HGB 12.5 11.5 - 16.0 g/dL    HCT 43.9 35.0 - 47.0 %    MCV 96.1 80.0 - 99.0 FL    MCH 27.4 26.0 - 34.0 PG    MCHC 28.5 (L) 30.0 - 36.5 g/dL    RDW 13.3 11.5 - 14.5 %    PLATELET 807 854 - 041 K/uL    MPV 11.4 8.9 - 12.9 FL    NRBC 0.0 0  WBC    ABSOLUTE NRBC 0.00 0.00 - 0.01 K/uL   GLUCOSE, POC    Collection Time: 08/18/20  5:31 AM   Result Value Ref Range    Glucose (POC) 191 (H) 65 - 100 mg/dL    Performed by Star Roulette PCT        Imaging:  I have personally reviewed the patients radiographs and have reviewed the reports:  CXR: nixon Tuttle MD

## 2020-08-18 NOTE — PROGRESS NOTES
Problem: Falls - Risk of  Goal: *Absence of Falls  Description: Document Devere Buckland Fall Risk and appropriate interventions in the flowsheet.   Outcome: Progressing Towards Goal  Note: Fall Risk Interventions:            Medication Interventions: Bed/chair exit alarm, Patient to call before getting OOB, Teach patient to arise slowly    Elimination Interventions: Call light in reach, Toileting schedule/hourly rounds, Stay With Me (per policy)              Problem: Breathing Pattern - Ineffective  Goal: *Absence of hypoxia  Outcome: Progressing Towards Goal  Goal: *Use of effective breathing techniques  Outcome: Progressing Towards Goal     Problem: Chronic Obstructive Pulmonary Disease (COPD)  Goal: *Oxygen saturation during activity within specified parameters  Outcome: Progressing Towards Goal

## 2020-08-18 NOTE — PROGRESS NOTES
Problem: Falls - Risk of  Goal: *Absence of Falls  Description: Document Agatha Bowman Fall Risk and appropriate interventions in the flowsheet.   Outcome: Progressing Towards Goal  Note: Fall Risk Interventions:            Medication Interventions: Assess postural VS orthostatic hypotension, Bed/chair exit alarm, Evaluate medications/consider consulting pharmacy, Patient to call before getting OOB, Teach patient to arise slowly    Elimination Interventions: Bed/chair exit alarm, Call light in reach, Elevated toilet seat, Patient to call for help with toileting needs, Stay With Me (per policy), Toilet paper/wipes in reach, Toileting schedule/hourly rounds

## 2020-08-18 NOTE — PATIENT INSTRUCTIONS
217 [facility]., [place]. 1668 [facility], [address] Tel.  [phone] Fax. 100 [facility] [address] [place], 200 [address] [facility] Tel.  [phone] Fax. [phone] [address] Tel.  [phone] Fax. [phone] Learning About CPAP for Sleep Apnea What is CPAP? CPAP is a small machine that you use at home every night while you sleep. It increases air pressure in your throat to keep your airway open. When you have sleep apnea, this can help you sleep better so you feel much better. CPAP stands for \"continuous positive airway pressure. \" The CPAP machine will have one of the following: · A mask that covers your nose and mouth · Prongs that fit into your nose · A mask that covers your nose only, the most common type. This type is called NCPAP. The N stands for \"nasal.\" Why is it done? CPAP is usually the best treatment for obstructive sleep apnea. It is the first treatment choice and the most widely used. Your doctor may suggest CPAP if you have: · Moderate to severe sleep apnea. · Sleep apnea and coronary artery disease (CAD) or heart failure. How does it help? · CPAP can help you have more normal sleep, so you feel less sleepy and more alert during the daytime. · CPAP may help keep heart failure or other heart problems from getting worse. · NCPAP may help lower your blood pressure. · If you use CPAP, your bed partner may also sleep better because you are not snoring or restless. What are the side effects? Some people who use CPAP have: · A dry or stuffy nose and a sore throat. · Irritated skin on the face. · Sore eyes. · Bloating. If you have any of these problems, work with your doctor to fix them. Here are some things you can try: · Be sure the mask or nasal prongs fit well. · See if your doctor can adjust the pressure of your CPAP. · If your nose is dry, try a humidifier. · If your nose is runny or stuffy, try decongestant medicine or a steroid nasal spray. If these things do not help, you might try a different type of machine. Some machines have air pressure that adjusts on its own. Others have air pressures that are different when you breathe in than when you breathe out. This may reduce discomfort caused by too much pressure in your nose. Where can you learn more? Go to Simpleview.be Enter H370 in the search box to learn more about \"Learning About CPAP for Sleep Apnea. \"  
© 7166-1280 Healthwise, Incorporated. Care instructions adapted under license by [facility] (which disclaims liability or warranty for this information). This care instruction is for use with your licensed healthcare professional. If you have questions about a medical condition or this instruction, always ask your healthcare professional. [address] any warranty or liability for your use of this information. Content Version: 6.6.47260; Last Revised: January 11, 2010 PROPER SLEEP HYGIENE What to avoid · Do not have drinks with caffeine, such as coffee or black tea, for 8 hours before bed. · Do not smoke or use other types of tobacco near bedtime. Nicotine is a stimulant and can keep you awake. · Avoid drinking alcohol late in the evening, because it can cause you to wake in the middle of the night. · Do not eat a big meal close to bedtime. If you are hungry, eat a light snack. · Do not drink a lot of water close to bedtime, because the need to urinate may wake you up during the night. · Do not read or watch TV in bed. Use the bed only for sleeping and sexual activity. What to try · Go to bed at the same time every night, and wake up at the same time every morning. Do not take naps during the day. · Keep your bedroom quiet, dark, and cool. · Get regular exercise, but not within 3 to 4 hours of your bedtime. Edgardo Pedraza · Sleep on a comfortable pillow and mattress. · If watching the clock makes you anxious, turn it facing away from you so you cannot see the time. · If you worry when you lie down, start a worry book. Well before bedtime, write down your worries, and then set the book and your concerns aside. · Try meditation or other relaxation techniques before you go to bed. · If you cannot fall asleep, get up and go to another room until you feel sleepy. Do something relaxing. Repeat your bedtime routine before you go to bed again. · Make your house quiet and calm about an hour before bedtime. Turn down the lights, turn off the TV, log off the computer, and turn down the volume on music. This can help you relax after a busy day. Drowsy Driving: The [facility] 54 cites drowsiness as a causing factor in more than 397,800 police reported crashes annually, resulting in 76,000 injuries and 1,500 deaths. Other surveys suggest 55% of people polled have driven while drowsy in the past year, 23% had fallen asleep but not crashed, 3% crashed, and 2% had and accident due to drowsy driving. Who is at risk? Young Drivers: One study of drowsy driving accidents states that 55% of the drivers were under 25 years. Of those, 75% were male. Shift Workers and Travelers: People who work overnight or travel across time zones frequently are at higher risk of experiencing Circadian Rhythm Disorders. They are trying to work and function when their body is programed to sleep. Sleep Deprived: Lack of sleep has a serious impact on your ability to pay attention or focus on a task. Consistently getting less than the average of 8 hours your body needs creates partial or cumulative sleep deprivation.   
Untreated Sleep Disorders: Sleep Apnea, Narcolepsy, R.L.S., and other sleep disorders (untreated) prevent a person from getting enough restful sleep. This leads to excessive daytime sleepiness and increases the risk for drowsy driving accidents by up to 7 times. Medications / Alcohol: Even over the counter medications can cause drowsiness. Medications that impair a drivers attention should have a warning label. Alcohol naturally makes you sleepy and on its own can cause accidents. Combined with excessive drowsiness its effects are amplified. Signs of Drowsy Driving: * You don't remember driving the last few miles * You may drift out of your logan * You are unable to focus and your thoughts wander * You may yawn more often than normal 
 * You have difficulty keeping your eyes open / nodding off * Missing traffic signs, speeding, or tailgating Prevention-  
Good sleep hygiene, lifestyle and behavioral choices have the most impact on drowsy driving. There is no substitute for sleep and the average person requires 8 hours nightly. If you find yourself driving drowsy, stop and sleep. Consider the sleep hygiene tips provided during your visit as well. Medication Refill Policy: Refills for all medications require 1 week advance notice. Please have your pharmacy fax a refill request. We are unable to fax, or call in \"controled substance\" medications and you will need to pick these prescriptions up from our office. Infinity [employer]mented [employer] Activation Thank you for requesting access to [employer]. Please follow the instructions below to securely access and download your online medical record. [employer] allows you to send messages to your doctor, view your test results, renew your prescriptions, schedule appointments, and more. How Do I Sign Up? 1. In your internet browser, go to [email or web address]. 2. Click on the First Time User? Click Here link in the Sign In box. You will see the New Member Sign Up page. 3. Enter your Infinity Augmented [employer] Access Code exactly as it appears below.  You will not need to use this code after youve completed the sign-up process. If you do not sign up before the expiration date, you must request a new code. Dacuda Access Code: [email or web address] Expires: 10/2/2020  9:51 AM (This is the date your Dacuda access code will ) 4. Enter the last four digits of your Social Security Number (xxxx) and Date of Birth (mm/dd/yyyy) as indicated and click Submit. You will be taken to the next sign-up page. 5. Create a Dacuda ID. This will be your Dacuda login ID and cannot be changed, so think of one that is secure and easy to remember. 6. Create a Dacuda password. You can change your password at any time. 7. Enter your Password Reset Question and Answer. This can be used at a later time if you forget your password. 8. Enter your e-mail address. You will receive e-mail notification when new information is available in [address] 9. Click Sign Up. You can now view and download portions of your medical record. 10. Click the Download Summary menu link to download a portable copy of your medical information. Additional Information If you have questions, please call [phone]. Remember, Dacuda is NOT to be used for urgent needs. For medical emergencies, dial 911.

## 2020-08-18 NOTE — PROGRESS NOTES
Reason for Admission:   COPD exacerbation                   RUR Score:     19 Moderate risk for readmission             PCP: First and Last name:  Dr. Kemar Cochran   [facility] of [facility]   Are you a current patient: Yes/No: Yes   Approximate date of last visit: Last unknown   Can you participate in a virtual visit if needed: No    Do you (patient/family) have any concerns for transition/discharge? None              Plan for utilizing home health:  Pt has had home health in the past. Pt does is not receptive to home health at d/c. Current Advanced Directive/Advance Care Plan:  Pt is FULL code status. Pt does have an ACP on file. Transition of Care Plan:        Home  Follow-up Appointments  2nd IM Letter    CM met with pt at bedside to discuss d/c plan. Pt was alert and oriented. CM verified pt's demographics, insurance and PCP. Medicare pt has received, reviewed, and signed 1st IM letter informing them of their right to appeal the discharge. Signed copy has been placed on pt bedside chart. Pt is a 51 y/o [place] American female admitted to [facility] on 8/17/2020 for COPD exacerbation. Pt's PCP is Dr. Greer Malloy. Pt sees PCP every month. Pt uses [facility] pharmacy on [address] [place] [address] and [facility] for Rx. Pt resides alone in an one level home with 5 NIKOS. Pt needs assistance with ADL's and IADL's. Pt does not drive. Pt utilizes medical transport for transportation. Pt has a BSC, shower chair, scooter, rollator, trilogy and home oxygen. Pt is is on 5 lnc home oxygen supplied by [employer]. Pt has had home health in the past. No SNF or acute inpatient rehab in the past. Pt is FULL code status. Pt does have an ACP on file. Pt's will need medical transport at d/c. Care Management Interventions  PCP Verified by CM: Yes(Pt's PCP is Dr. Greer Malloy.  Pt sees PCP every month. )  Palliative Care Criteria Met (RRAT>21 & CHF Dx)?: No  Mode of Transport at Discharge: BLS(Pt will need medical transport at d/c. )  Transition of Care Consult (CM Consult): Other(Home with Follow-up appointment)  Discharge Durable Medical Equipment: No  Physical Therapy Consult: No  Occupational Therapy Consult: No  Speech Therapy Consult: No  Current Support Network: Own Home, Lives Alone  Confirm Follow Up Transport: Other (see comment)(Pt does not drive. Pt utilizes medical transport for transportation)  Discharge Location  Discharge Placement: (Home with Follow-up Appointments)    CM will continue to follow patient for discharge planning needs and arrange for services as deemed necessary.     Brianna Oakley [address]  [phone]

## 2020-08-18 NOTE — PROGRESS NOTES
[address] [place]. [place], [address]  Tel.  [phone]  Fax. 100 [facility] 60  [place], [address]  Tel.  [phone]  Fax. [phone] 9250 [facility] [address]  Tel.  [phone]  Fax. [phone]         Arnie Mcmanus is a 52 y.o. female evaluated via telephone on 8/18/2020. Consent:  She and/or health care decision maker is aware that that she may receive a bill for this telephone service, depending on her insurance coverage, and has provided verbal consent to proceed: Yes    Patient was identified using their date of birth and street address for this audio only visit and patient was unable to connect on the audio-visual platform. Documentation:    I communicated with the patient about:    Current appointment was schedule to discuss request for discontinuation of Trilogy made by her DME on 07-29-20. She is currently hospitalized due to SOB and fluid build up. She is currently on the hospital Bi-Level device with 6 liters but is tolerating this therapy. She reports of having had issues with the Trilogy device, she states she feels as if she is not getting air \"I could not breathe with it\". She states ettings have been changed a couple of times by the [employer]. She has a past history of breast cancer and has been told that a spot has been identified on a recent PET scan. Encounter Diagnoses   Name Primary?  GEN (obstructive sleep apnea) Yes    Hypoventilation associated with obesity (HCC)     On supplemental oxygen by nasal cannula     Chronic obstructive pulmonary disease, unspecified COPD type ([facility]     BMI 60.0-69.9, adult [facility])          Details of this discussion including any medical advice provided:     Patient agrees to keep the trilogy device and will contact us at discharge so we may be able to adjust her setting and make therapy more comfortable for her.       I affirm this is a Patient Initiated Episode with an Established Patient who has not had a related appointment within my department in the past 7 days or scheduled within the next 24 hours. Total Time: minutes: 11-20 minutes    Note: not billable if this call serves to triage the patient into an appointment for the relevant concern    Bill Chan MD, FAASM  Diplomate American Board of Sleep Medicine  Diplomate in Sleep Medicine - ABP    Electronically signed. 08/18/20    Left message to call back or reschedule visit if unable to proceed with the encounter at this time.   Mobile:    [phone]

## 2020-08-18 NOTE — PROGRESS NOTES
[address] [facility]    Cardiopulmonary Care Interdisciplinary Rounds were held today to discuss patient's plan of care and outcomes. The following members were present: NP/Physician, Pharmacy, Nursing and Case Management.     PLAN OF CARE:   Continue current treatment plan    Expected Length of Stay:  3d 14h

## 2020-08-18 NOTE — PROGRESS NOTES
Hospitalist Progress Note    NAME: Nicolas Brush   :  1970   MRN:  [id number]       Assessment / Plan:  Acute on chronic hypoxic and hypercapnic respiratory failure/COPD exacerbation  Acute on chronic diastolic heart failure  Super morbid obesity  OHS, is suppose to use trilogy at home  CXR neg for acute process. COVID-19 negative  VBG reviewed  Continue steroids IV  Continue Lasix, Diamox IV added  duonebs prn, advair       Chronic systolic HF  HTN  CAD    Cont' PO lasix, BB, statin, strict I&O     T2DM  Lower dose lantus  SSI  Add NPH link with steroids     L2 sclerotic lesion/history of breast cancer  outpatient follow-up with her oncologist     super morbid obesity  Body mass index is 67.19 kg/m².     Code Status: Full  Lovenox for DVT prophylaxis       Subjective:     Chief Complaint / Reason for Physician Visit  \" Continues to complain of leg swelling, same as yesterday, was on BiPAP overnight\". Discussed with RN events overnight. Review of Systems:  Symptom Y/N Comments  Symptom Y/N Comments   Fever/Chills n   Chest Pain n    Poor Appetite n   Edema y    Cough n   Abdominal Pain n    Sputum    Joint Pain     SOB/RAZO y   Pruritis/Rash     Nausea/vomit    Tolerating PT/OT     Diarrhea    Tolerating Diet     Constipation    Other       Could NOT obtain due to:      Objective:     VITALS:   Last 24hrs VS reviewed since prior progress note.  Most recent are:  Patient Vitals for the past 24 hrs:   Temp Pulse Resp BP SpO2   20 0858     93 %   20 0815     96 %   20 0759 97.5 °F (36.4 °C) 69 26 105/59 95 %   20 0305 97.9 °F (36.6 °C) 72 18 99/52 99 %   20 1941     93 %   20 1914 98.3 °F (36.8 °C) 73 18 114/70 95 %   20 1535     94 %   20 1450 98.1 °F (36.7 °C) 77 16 143/90 91 %   20 1345  68 16 125/65 91 %   20 1330  66 21 113/76 93 %   20 1213  83  114/78    20 1210  70 26 114/78 94 %   20 1202     95 %   08/17/20 1130  72 21 125/53 92 %   08/17/20 1104     92 %   08/17/20 1100 98.2 °F (36.8 °C) 67 15 (!) 120/95 91 %   08/17/20 1001  78 18 120/78 (!) 89 %       Intake/Output Summary (Last 24 hours) at 8/18/2020 0951  Last data filed at 8/18/2020 0306  Gross per 24 hour   Intake 920 ml   Output 1200 ml   Net -280 ml        PHYSICAL EXAM:  General: WD, WN. Alert, cooperative, no acute distress    EENT:  EOMI. Anicteric sclerae. MMM  Resp:  Lungs clear  CV:  Regular  rhythm, bilateral leg swelling, lymphedema  GI:  Soft, Non distended, Non tender.  +Bowel sounds  Neurologic:  Alert and oriented X 3, normal speech,   Psych:   Good insight. Not anxious nor agitated  Skin:  No rashes. No jaundice    Reviewed most current lab test results and cultures  YES  Reviewed most current radiology test results   YES  Review and summation of old records today    NO  Reviewed patient's current orders and MAR    YES  PMH/SH reviewed - no change compared to H&P  ________________________________________________________________________  Care Plan discussed with:    Comments   Patient x    Family      RN x    Care Manager     Consultant                        Multidiciplinary team rounds were held today with , nursing, pharmacist and clinical coordinator. Patient's plan of care was discussed; medications were reviewed and discharge planning was addressed. ________________________________________________________________________  Total NON critical care TIME:  35   Minutes    Total CRITICAL CARE TIME Spent:   Minutes non procedure based      Comments   >50% of visit spent in counseling and coordination of care     ________________________________________________________________________  Pebbles Gross MD     Procedures: see electronic medical records for all procedures/Xrays and details which were not copied into this note but were reviewed prior to creation of Plan.       LABS:  I reviewed today's most current labs and imaging studies.   Pertinent labs include:  Recent Labs     08/18/20 0232 08/17/20 0045   WBC 15.0* 11.4*   HGB 12.5 13.6   HCT 43.9 46.7    181     Recent Labs     08/18/20 0232 08/17/20 0045    138   K 4.1 4.5    103   CO2 34* 35*   * 233*   BUN 23* 19   CREA 0.92 0.71   CA 9.9 10.2*   ALB  --  3.5   TBILI  --  0.3   ALT  --  18       Signed: Isamar Cuello MD

## 2020-08-18 NOTE — PROGRESS NOTES
[address] END OF SHIFT REPORT Bedside shift change report GIVEN TO ***, RN. Report included the following information SBAR, Kardex, Intake/Output, MAR and Recent Results. SIGNIFICANT CHANGES DURING SHIFT:   
 
 
CONCERNS TO ADDRESS WITH MD:   
 
 
 
[address] NURSING NOTE Admission Date 8/17/2020 Admission Diagnosis COPD exacerbation ([facility] [J44.1] Consults IP CONSULT TO PULMONOLOGY Cardiac Monitoring [x] Yes [] No  
  
Purposeful Hourly Rounding [x] Yes   
Yu Score Total Score: 1 Yu score 3 or > [] Bed Alarm [] Avasys [] 1:1 sitter [] Patient refused (Signed refusal form in chart) Jeffrey Score Jeffrey Score: 19 Jeffrey score 14 or < [] PMT consult [] Wound Care consult  
 []  Specialty bed  [] Nutrition consult Influenza Vaccine Received Flu Vaccine for Current Season (usually Sept-March): Not Flu Season Oxygen needs? [] Room air Oxygen @  []1L    []2L    []3L   []4L    [x]5L   []6L via NC Chronic home O2 use? [x] Yes [] No 
Perform O2 challenge test and document in progress note using smartphrase (.Homeoxygen) Last bowel movement Last Bowel Movement Date: 08/16/20 Urinary Catheter LDAs Peripheral IV 08/17/20 Left Antecubital (Active) Site Assessment Clean, dry, & intact 08/18/20 1945 Phlebitis Assessment 0 08/18/20 1945 Infiltration Assessment 0 08/18/20 1945 Dressing Status Clean, dry, & intact 08/18/20 1945 Dressing Type Transparent 08/18/20 1945 Hub Color/Line Status Pink;Flushed 08/18/20 1945 Readmission Risk Assessment Tool Score High Risk   
      
 23 Total Score 3 Has Seen PCP in Last 6 Months (Yes=3, No=0)  
 4 IP Visits Last 12 Months (1-3=4, 4=9, >4=11) 9 Pt. Coverage (Medicare=5 , Medicaid, or Self-Pay=4) 7 Charlson Comorbidity Score (Age + Comorbid Conditions) Criteria that do not apply:  
 . Living with Significant Other. Assisted Living. LTAC. SNF.  or  
 Rehab Patient Length of Stay (>5 days = 3) Expected Length of Stay 3d 14h Actual Length of Stay 1

## 2020-08-19 LAB
ANION GAP SERPL CALC-SCNC: 0 MMOL/L (ref 5–15)
BASOPHILS # BLD: 0 K/UL (ref 0–0.1)
BASOPHILS NFR BLD: 0 % (ref 0–1)
BUN SERPL-MCNC: 23 MG/DL (ref 6–20)
BUN/CREAT SERPL: 29 (ref 12–20)
CALCIUM SERPL-MCNC: 10 MG/DL (ref 8.5–10.1)
CHLORIDE SERPL-SCNC: 104 MMOL/L (ref 97–108)
CO2 SERPL-SCNC: 32 MMOL/L (ref 21–32)
CREAT SERPL-MCNC: 0.8 MG/DL (ref 0.55–1.02)
DIFFERENTIAL METHOD BLD: ABNORMAL
EOSINOPHIL # BLD: 0 K/UL (ref 0–0.4)
EOSINOPHIL NFR BLD: 0 % (ref 0–7)
ERYTHROCYTE [DISTWIDTH] IN BLOOD BY AUTOMATED COUNT: 13.2 % (ref 11.5–14.5)
GLUCOSE BLD STRIP.AUTO-MCNC: 139 MG/DL (ref 65–100)
GLUCOSE BLD STRIP.AUTO-MCNC: 202 MG/DL (ref 65–100)
GLUCOSE BLD STRIP.AUTO-MCNC: 216 MG/DL (ref 65–100)
GLUCOSE BLD STRIP.AUTO-MCNC: 235 MG/DL (ref 65–100)
GLUCOSE SERPL-MCNC: 199 MG/DL (ref 65–100)
HCT VFR BLD AUTO: 43.5 % (ref 35–47)
HGB BLD-MCNC: 12.5 G/DL (ref 11.5–16)
IMM GRANULOCYTES # BLD AUTO: 0.1 K/UL (ref 0–0.04)
IMM GRANULOCYTES NFR BLD AUTO: 1 % (ref 0–0.5)
LYMPHOCYTES # BLD: 0.7 K/UL (ref 0.8–3.5)
LYMPHOCYTES NFR BLD: 5 % (ref 12–49)
MCH RBC QN AUTO: 27.3 PG (ref 26–34)
MCHC RBC AUTO-ENTMCNC: 28.7 G/DL (ref 30–36.5)
MCV RBC AUTO: 95 FL (ref 80–99)
MONOCYTES # BLD: 0.9 K/UL (ref 0–1)
MONOCYTES NFR BLD: 7 % (ref 5–13)
NEUTS SEG # BLD: 11.4 K/UL (ref 1.8–8)
NEUTS SEG NFR BLD: 87 % (ref 32–75)
NRBC # BLD: 0 K/UL (ref 0–0.01)
NRBC BLD-RTO: 0 PER 100 WBC
PLATELET # BLD AUTO: 191 K/UL (ref 150–400)
PMV BLD AUTO: 10.9 FL (ref 8.9–12.9)
POTASSIUM SERPL-SCNC: 4.3 MMOL/L (ref 3.5–5.1)
RBC # BLD AUTO: 4.58 M/UL (ref 3.8–5.2)
RBC MORPH BLD: ABNORMAL
SERVICE CMNT-IMP: ABNORMAL
SODIUM SERPL-SCNC: 136 MMOL/L (ref 136–145)
WBC # BLD AUTO: 13.1 K/UL (ref 3.6–11)

## 2020-08-19 PROCEDURE — 82962 GLUCOSE BLOOD TEST: CPT

## 2020-08-19 PROCEDURE — 74011250636 HC RX REV CODE- 250/636: Performed by: INTERNAL MEDICINE

## 2020-08-19 PROCEDURE — 85025 COMPLETE CBC W/AUTO DIFF WBC: CPT

## 2020-08-19 PROCEDURE — 74011000250 HC RX REV CODE- 250: Performed by: INTERNAL MEDICINE

## 2020-08-19 PROCEDURE — 74011636637 HC RX REV CODE- 636/637: Performed by: INTERNAL MEDICINE

## 2020-08-19 PROCEDURE — 36415 COLL VENOUS BLD VENIPUNCTURE: CPT

## 2020-08-19 PROCEDURE — 77010033678 HC OXYGEN DAILY

## 2020-08-19 PROCEDURE — 94640 AIRWAY INHALATION TREATMENT: CPT

## 2020-08-19 PROCEDURE — 74011250637 HC RX REV CODE- 250/637: Performed by: INTERNAL MEDICINE

## 2020-08-19 PROCEDURE — 65660000001 HC RM ICU INTERMED STEPDOWN

## 2020-08-19 PROCEDURE — 80048 BASIC METABOLIC PNL TOTAL CA: CPT

## 2020-08-19 RX ORDER — SPIRONOLACTONE 25 MG/1
25 TABLET ORAL DAILY
Status: DISCONTINUED | OUTPATIENT
Start: 2020-08-19 | End: 2020-08-20 | Stop reason: HOSPADM

## 2020-08-19 RX ORDER — FUROSEMIDE 10 MG/ML
40 INJECTION INTRAMUSCULAR; INTRAVENOUS 2 TIMES DAILY
Status: DISCONTINUED | OUTPATIENT
Start: 2020-08-19 | End: 2020-08-20 | Stop reason: HOSPADM

## 2020-08-19 RX ADMIN — ASPIRIN 81 MG CHEWABLE TABLET 81 MG: 81 TABLET CHEWABLE at 09:41

## 2020-08-19 RX ADMIN — BUDESONIDE 250 MCG: 0.25 INHALANT RESPIRATORY (INHALATION) at 07:45

## 2020-08-19 RX ADMIN — IPRATROPIUM BROMIDE AND ALBUTEROL SULFATE 3 ML: .5; 3 SOLUTION RESPIRATORY (INHALATION) at 07:45

## 2020-08-19 RX ADMIN — METHYLPREDNISOLONE SODIUM SUCCINATE 40 MG: 40 INJECTION, POWDER, FOR SOLUTION INTRAMUSCULAR; INTRAVENOUS at 09:41

## 2020-08-19 RX ADMIN — BUDESONIDE 250 MCG: 0.25 INHALANT RESPIRATORY (INHALATION) at 19:28

## 2020-08-19 RX ADMIN — Medication 10 ML: at 06:44

## 2020-08-19 RX ADMIN — HUMAN INSULIN 8 UNITS: 100 INJECTION, SUSPENSION SUBCUTANEOUS at 09:41

## 2020-08-19 RX ADMIN — NYSTATIN: 100000 CREAM TOPICAL at 13:21

## 2020-08-19 RX ADMIN — HUMAN INSULIN 8 UNITS: 100 INJECTION, SUSPENSION SUBCUTANEOUS at 18:19

## 2020-08-19 RX ADMIN — Medication 10 ML: at 21:34

## 2020-08-19 RX ADMIN — ENOXAPARIN SODIUM 40 MG: 40 INJECTION SUBCUTANEOUS at 21:33

## 2020-08-19 RX ADMIN — LORATADINE 10 MG: 10 TABLET ORAL at 09:42

## 2020-08-19 RX ADMIN — INSULIN LISPRO 2 UNITS: 100 INJECTION, SOLUTION INTRAVENOUS; SUBCUTANEOUS at 21:33

## 2020-08-19 RX ADMIN — Medication 10 ML: at 06:43

## 2020-08-19 RX ADMIN — INSULIN GLARGINE 20 UNITS: 100 INJECTION, SOLUTION SUBCUTANEOUS at 09:41

## 2020-08-19 RX ADMIN — INSULIN LISPRO 3 UNITS: 100 INJECTION, SOLUTION INTRAVENOUS; SUBCUTANEOUS at 13:21

## 2020-08-19 RX ADMIN — IPRATROPIUM BROMIDE AND ALBUTEROL SULFATE 3 ML: .5; 3 SOLUTION RESPIRATORY (INHALATION) at 15:25

## 2020-08-19 RX ADMIN — METOPROLOL TARTRATE 25 MG: 25 TABLET, FILM COATED ORAL at 09:44

## 2020-08-19 RX ADMIN — FLUTICASONE PROPIONATE 2 SPRAY: 50 SPRAY, METERED NASAL at 09:43

## 2020-08-19 RX ADMIN — FUROSEMIDE 40 MG: 10 INJECTION, SOLUTION INTRAMUSCULAR; INTRAVENOUS at 18:22

## 2020-08-19 RX ADMIN — PANTOPRAZOLE SODIUM 40 MG: 40 TABLET, DELAYED RELEASE ORAL at 09:42

## 2020-08-19 RX ADMIN — ATORVASTATIN CALCIUM 10 MG: 10 TABLET, FILM COATED ORAL at 21:33

## 2020-08-19 RX ADMIN — IPRATROPIUM BROMIDE AND ALBUTEROL SULFATE 3 ML: .5; 3 SOLUTION RESPIRATORY (INHALATION) at 12:49

## 2020-08-19 RX ADMIN — IPRATROPIUM BROMIDE AND ALBUTEROL SULFATE 3 ML: .5; 3 SOLUTION RESPIRATORY (INHALATION) at 19:23

## 2020-08-19 RX ADMIN — ARFORMOTEROL TARTRATE 15 MCG: 15 SOLUTION RESPIRATORY (INHALATION) at 07:44

## 2020-08-19 RX ADMIN — METHYLPREDNISOLONE SODIUM SUCCINATE 40 MG: 40 INJECTION, POWDER, FOR SOLUTION INTRAMUSCULAR; INTRAVENOUS at 18:21

## 2020-08-19 RX ADMIN — CYCLOBENZAPRINE 5 MG: 10 TABLET, FILM COATED ORAL at 13:22

## 2020-08-19 RX ADMIN — ENOXAPARIN SODIUM 40 MG: 40 INJECTION SUBCUTANEOUS at 09:41

## 2020-08-19 RX ADMIN — ACETAZOLAMIDE SODIUM 500 MG: 500 INJECTION, POWDER, LYOPHILIZED, FOR SOLUTION INTRAVENOUS at 13:21

## 2020-08-19 RX ADMIN — METOPROLOL TARTRATE 25 MG: 25 TABLET, FILM COATED ORAL at 18:32

## 2020-08-19 RX ADMIN — ARFORMOTEROL TARTRATE 15 MCG: 15 SOLUTION RESPIRATORY (INHALATION) at 19:28

## 2020-08-19 RX ADMIN — FUROSEMIDE 40 MG: 40 TABLET ORAL at 09:41

## 2020-08-19 RX ADMIN — NYSTATIN: 100000 CREAM TOPICAL at 18:24

## 2020-08-19 RX ADMIN — Medication 10 ML: at 13:23

## 2020-08-19 RX ADMIN — ACETAMINOPHEN 650 MG: 325 TABLET ORAL at 00:23

## 2020-08-19 RX ADMIN — INSULIN LISPRO 3 UNITS: 100 INJECTION, SOLUTION INTRAVENOUS; SUBCUTANEOUS at 18:18

## 2020-08-19 RX ADMIN — SPIRONOLACTONE 25 MG: 25 TABLET ORAL at 18:32

## 2020-08-19 NOTE — PROGRESS NOTES
Problem: Falls - Risk of  Goal: *Absence of Falls  Description: Document Chavez Crenshaw Fall Risk and appropriate interventions in the flowsheet. Outcome: Progressing Towards Goal  Note: Fall Risk Interventions:            Medication Interventions: Patient to call before getting OOB, Teach patient to arise slowly    Elimination Interventions: Bed/chair exit alarm              Problem: Breathing Pattern - Ineffective  Goal: *Absence of hypoxia  Outcome: Progressing Towards Goal     Problem: Risk for Spread of Infection  Goal: Prevent transmission of infectious organism to others  Description: Prevent the transmission of infectious organisms to other patients, staff members, and visitors.   Outcome: Progressing Towards Goal     Problem: Chronic Obstructive Pulmonary Disease (COPD)  Goal: *Oxygen saturation during activity within specified parameters  Outcome: Progressing Towards Goal

## 2020-08-19 NOTE — PROGRESS NOTES
Hospitalist Progress Note    NAME: Osmar Daugherty   :  1970   MRN:  [id number]       Assessment / Plan:  Acute on chronic hypoxic and hypercapnic respiratory failure/COPD exacerbation  Acute on chronic diastolic heart failure  Super morbid obesity  OHS, is suppose to use trilogy at home  CXR neg for acute process. COVID-19 negative  VBG reviewed  Continue steroids IV  Received 3 doses of IV Diamox, changed to Lasix 40 IV twice daily,  Add spironolactone  duonebs prn, advair  Currently on 6 L oxygen, at home 4-5 L home O2       Chronic systolic HF  HTN  CAD    Cont' PO lasix, BB, statin, strict I&O     T2DM  Lower dose lantus  SSI  Add NPH link with steroids     L2 sclerotic lesion/history of breast cancer  outpatient follow-up with her oncologist     super morbid obesity  Body mass index is 67.19 kg/m².     Code Status: Full  Lovenox for DVT prophylaxis        Subjective:     Chief Complaint / Reason for Physician Visit  \" Continues to complain of leg swelling, same as yesterday, was on BiPAP overnight\". Discussed with RN events overnight. Review of Systems:  Symptom Y/N Comments  Symptom Y/N Comments   Fever/Chills n   Chest Pain n    Poor Appetite n   Edema y    Cough n   Abdominal Pain n    Sputum    Joint Pain     SOB/RAZO y   Pruritis/Rash     Nausea/vomit    Tolerating PT/OT     Diarrhea    Tolerating Diet     Constipation    Other       Could NOT obtain due to:      Objective:     VITALS:   Last 24hrs VS reviewed since prior progress note.  Most recent are:  Patient Vitals for the past 24 hrs:   Temp Pulse Resp BP SpO2   20 1249     92 %   20 1053 97.6 °F (36.4 °C) 66 18 133/65 94 %   20 0944  68  114/67    20 0853    100/69    20 0801 97.4 °F (36.3 °C) 65 16 97/45 95 %   20 0746     96 %   20 0111  64 20 107/60 93 %   20 0017  74      20     94 %   20     94 %   20 1848 97.9 °F (36.6 °C) 60 20 108/64 96 %   08/18/20 1435 98.1 °F (36.7 °C) 66 20 116/60 96 %       Intake/Output Summary (Last 24 hours) at 8/19/2020 1409  Last data filed at 8/18/2020 2133  Gross per 24 hour   Intake 480 ml   Output 1800 ml   Net -1320 ml        PHYSICAL EXAM:  General: WD, WN. Alert, cooperative, no acute distress    EENT:  EOMI. Anicteric sclerae. MMM  Resp:  Lungs clear  CV:  Regular  rhythm, bilateral leg swelling, lymphedema  GI:  Soft, Non distended, Non tender.  +Bowel sounds  Neurologic:  Alert and oriented X 3, normal speech,   Psych:   Good insight. Not anxious nor agitated  Skin:  No rashes. No jaundice    Reviewed most current lab test results and cultures  YES  Reviewed most current radiology test results   YES  Review and summation of old records today    NO  Reviewed patient's current orders and MAR    YES  PMH/SH reviewed - no change compared to H&P  ________________________________________________________________________  Care Plan discussed with:    Comments   Patient x    Family      RN x    Care Manager     Consultant                        Multidiciplinary team rounds were held today with , nursing, pharmacist and clinical coordinator. Patient's plan of care was discussed; medications were reviewed and discharge planning was addressed. ________________________________________________________________________  Total NON critical care TIME:  35   Minutes    Total CRITICAL CARE TIME Spent:   Minutes non procedure based      Comments   >50% of visit spent in counseling and coordination of care     ________________________________________________________________________  Mabeline Longs, MD     Procedures: see electronic medical records for all procedures/Xrays and details which were not copied into this note but were reviewed prior to creation of Plan. LABS:  I reviewed today's most current labs and imaging studies.   Pertinent labs include:  Recent Labs     08/19/20  0444 08/18/20 0232 08/17/20  0045   WBC 13.1* 15.0* 11.4*   HGB 12.5 12.5 13.6   HCT 43.5 43.9 46.7    192 181     Recent Labs     08/19/20  0444 08/18/20 0232 08/17/20 0045    137 138   K 4.3 4.1 4.5    103 103   CO2 32 34* 35*   * 223* 233*   BUN 23* 23* 19   CREA 0.80 0.92 0.71   CA 10.0 9.9 10.2*   ALB  --   --  3.5   TBILI  --   --  0.3   ALT  --   --  18       Signed: Waldemar Casper MD

## 2020-08-19 NOTE — PROGRESS NOTES
ADULT PROTOCOL: JET AEROSOL  REASSESSMENT    Patient  Kati Conway     52 y.o.   female     8/18/2020  10:28 PM    Breath Sounds Pre Procedure: Right Breath Sounds: Diminished                               Left Breath Sounds: Diminished    Breath Sounds Post Procedure: Right Breath Sounds: Diminished                                 Left Breath Sounds: Diminished    Breathing pattern: Pre procedure Breathing Pattern: Regular          Post procedure Breathing Pattern: Regular    Heart Rate: Pre procedure Pulse: 63           Post procedure Pulse: 72    Resp Rate: Pre procedure Respirations: 18           Post procedure Respirations: 18      Cough: Pre procedure Cough: Non-productive               Post procedure Cough: Non-productive    Oxygen: O2 Device: Nasal cannula   Flow rate (L/min) 6     Changed: NO    SpO2: Pre procedure SpO2: 94 %   with oxygen              Post procedure SpO2: 93 %  with oxygen    Nebulizer Therapy: Current medications Aerosolized Medications: Brovana, Pulmicort,DUO_NEB      Changed: NO        Problem List:   Patient Active Problem List   Diagnosis Code    Malignant essential hypertension I10    Asthma J45.909    Obesity hypoventilation syndrome ([facility]) E66.2    Dyspnea R06.00    Chest pressure R07.89    Acute on chronic diastolic heart failure ([facility]) I50.33    S/P PTCA (percutaneous transluminal coronary angioplasty) Z98.61    Coronary atherosclerosis of native coronary artery I25.10    Hypoxia R09.02    Noncompliance with therapeutic plan Z91.11    Chest pain R07.9    GERD (gastroesophageal reflux disease) K21.9    Acute respiratory failure with hypoxia and hypercarbia ([facility]) J96.01, J96.02    COPD (chronic obstructive pulmonary disease) ([facility]) J44.9    Tobacco abuse Z72.0    BMI 60.0-69.9, adult ([facility]) Z68.44    Cellulitis L03.90    SIRS due to infectious process with acute organ dysfunction ([facility]) A41.9, R65.20    Sepsis associated hypotension ([facility]) A41.9, I95.9    Gangrenous toe (HCC) I96    Type II diabetes mellitus, uncontrolled ([facility] [address] E11.65    HTN (hypertension) V97    Diastolic CHF, chronic ([facility]) I50.32    Cough with hemoptysis R04.2    Lymphedema of both lower extremities I89.0    CAP (community acquired pneumonia) J18.9    Cellulitis, leg L03.119    COPD exacerbation ([facility]) J44.1    Maggot infestation B87.9    Shortness of breath R06.02    Multifocal pneumonia J18.9    Acute respiratory failure (HCC) J96.00    Acute on chronic respiratory failure with hypoxia and hypercapnia (HCC) J96.21, J96.22    NSVT (nonsustained ventricular tachycardia) ([facility]) I47.2    Respiratory failure ([facility]) J96.90    Elevated lipase R74.8    Abdominal pain R10.9    Counseling regarding goals of care Z71.89    Acute pancreatitis K85.90    Breast cancer (HCC) C50.919    Intertrigo L30.4    PNA (pneumonia) J18.9    Productive cough R05    Other fatigue R53.83    Acute chest pain R07.9    Acute on chronic respiratory failure with hypoxia (HCC) J96.21    Acute on chronic respiratory failure with hypercapnia (HCC) J96.22    Hypercapnic respiratory failure ([facility] J96.92       Respiratory Therapist: Yvrose Centeno

## 2020-08-19 NOTE — PROGRESS NOTES
[address] END OF SHIFT REPORT      Bedside and Verbal shift change report GIVEN TO José Miguel Maloney RN. Report included the following information SBAR, Kardex, Intake/Output, MAR and Cardiac Rhythm NSR.       SIGNIFICANT CHANGES DURING SHIFT:  Continuing diuresis      CONCERNS TO ADDRESS WITH MD:  N/A        [address] NURSING NOTE   Admission Date 8/17/2020   Admission Diagnosis COPD exacerbation ([address] [J44.1]   Consults IP CONSULT TO PULMONOLOGY      Cardiac Monitoring [x] Yes [] No      Purposeful Hourly Rounding [x] Yes    Yu Score Total Score: 1   Yu score 3 or > [] Bed Alarm [] Avasys [] 1:1 sitter [] Patient refused (Signed refusal form in chart)   Jeffrey Score Jeffrey Score: 19   Jeffrey score 14 or < [] PMT consult [] Wound Care consult    []  Specialty bed  [] Nutrition consult      Influenza Vaccine Received Flu Vaccine for Current Season (usually Sept-March): Not Flu Season           Oxygen needs? [] Room air Oxygen @  []1L    []2L    []3L   []4L    [x]5L   []6L via  NC   Chronic home O2 use? [x] Yes [] No  Perform O2 challenge test and document in progress note using smartphrase (.Homeoxygen)      Last bowel movement Last Bowel Movement Date: 08/16/20      Urinary Catheter             LDAs               Peripheral IV 08/17/20 Left Antecubital (Active)   Site Assessment Clean, dry, & intact 08/19/20 1515   Phlebitis Assessment 0 08/19/20 1515   Infiltration Assessment 0 08/19/20 1515   Dressing Status Clean, dry, & intact 08/19/20 1515   Dressing Type Transparent 08/19/20 1515   Hub Color/Line Status Pink;Flushed 08/19/20 1515                         Readmission Risk Assessment Tool Score High Risk            23       Total Score        3 Has Seen PCP in Last 6 Months (Yes=3, No=0)    4 IP Visits Last 12 Months (1-3=4, 4=9, >4=11)    9 Pt. Coverage (Medicare=5 , Medicaid, or Self-Pay=4)    7 Charlson Comorbidity Score (Age + Comorbid Conditions)        Criteria that do not apply:    .  Living with Significant Other. Assisted Living. LTAC. SNF.  or   Rehab    Patient Length of Stay (>5 days = 3)       Expected Length of Stay 3d 14h   Actual Length of Stay 2

## 2020-08-19 NOTE — PROGRESS NOTES
Bedside and Verbal shift change report given to [address] [place] [address] (oncoming nurse) by Flakita Hernandez (offgoing nurse). Report included the following information SBAR, Kardex, ED Summary, Intake/Output, MAR, Accordion, Recent Results and Cardiac Rhythm nsr.

## 2020-08-20 VITALS
OXYGEN SATURATION: 92 % | RESPIRATION RATE: 18 BRPM | SYSTOLIC BLOOD PRESSURE: 144 MMHG | WEIGHT: 293 LBS | HEART RATE: 74 BPM | BODY MASS INDEX: 45.99 KG/M2 | DIASTOLIC BLOOD PRESSURE: 67 MMHG | TEMPERATURE: 98 F | HEIGHT: 67 IN

## 2020-08-20 LAB
ANION GAP SERPL CALC-SCNC: 4 MMOL/L (ref 5–15)
BASOPHILS # BLD: 0 K/UL (ref 0–0.1)
BASOPHILS NFR BLD: 0 % (ref 0–1)
BUN SERPL-MCNC: 26 MG/DL (ref 6–20)
BUN/CREAT SERPL: 30 (ref 12–20)
CALCIUM SERPL-MCNC: 10.1 MG/DL (ref 8.5–10.1)
CHLORIDE SERPL-SCNC: 103 MMOL/L (ref 97–108)
CO2 SERPL-SCNC: 29 MMOL/L (ref 21–32)
CREAT SERPL-MCNC: 0.88 MG/DL (ref 0.55–1.02)
DIFFERENTIAL METHOD BLD: ABNORMAL
EOSINOPHIL # BLD: 0 K/UL (ref 0–0.4)
EOSINOPHIL NFR BLD: 0 % (ref 0–7)
ERYTHROCYTE [DISTWIDTH] IN BLOOD BY AUTOMATED COUNT: 13.4 % (ref 11.5–14.5)
GLUCOSE BLD STRIP.AUTO-MCNC: 135 MG/DL (ref 65–100)
GLUCOSE BLD STRIP.AUTO-MCNC: 167 MG/DL (ref 65–100)
GLUCOSE BLD STRIP.AUTO-MCNC: 235 MG/DL (ref 65–100)
GLUCOSE SERPL-MCNC: 212 MG/DL (ref 65–100)
HCT VFR BLD AUTO: 46.4 % (ref 35–47)
HGB BLD-MCNC: 13.7 G/DL (ref 11.5–16)
IMM GRANULOCYTES # BLD AUTO: 0.1 K/UL (ref 0–0.04)
IMM GRANULOCYTES NFR BLD AUTO: 1 % (ref 0–0.5)
LYMPHOCYTES # BLD: 0.7 K/UL (ref 0.8–3.5)
LYMPHOCYTES NFR BLD: 5 % (ref 12–49)
MCH RBC QN AUTO: 27.9 PG (ref 26–34)
MCHC RBC AUTO-ENTMCNC: 29.5 G/DL (ref 30–36.5)
MCV RBC AUTO: 94.5 FL (ref 80–99)
MONOCYTES # BLD: 0.7 K/UL (ref 0–1)
MONOCYTES NFR BLD: 5 % (ref 5–13)
NEUTS SEG # BLD: 12.5 K/UL (ref 1.8–8)
NEUTS SEG NFR BLD: 89 % (ref 32–75)
NRBC # BLD: 0 K/UL (ref 0–0.01)
NRBC BLD-RTO: 0 PER 100 WBC
PLATELET # BLD AUTO: 242 K/UL (ref 150–400)
PLATELET COMMENTS,PCOM: ABNORMAL
PMV BLD AUTO: 10.8 FL (ref 8.9–12.9)
POTASSIUM SERPL-SCNC: 4.3 MMOL/L (ref 3.5–5.1)
RBC # BLD AUTO: 4.91 M/UL (ref 3.8–5.2)
RBC MORPH BLD: ABNORMAL
SERVICE CMNT-IMP: ABNORMAL
SODIUM SERPL-SCNC: 136 MMOL/L (ref 136–145)
WBC # BLD AUTO: 14 K/UL (ref 3.6–11)

## 2020-08-20 PROCEDURE — 74011636637 HC RX REV CODE- 636/637: Performed by: INTERNAL MEDICINE

## 2020-08-20 PROCEDURE — 74011000250 HC RX REV CODE- 250: Performed by: INTERNAL MEDICINE

## 2020-08-20 PROCEDURE — 74011250636 HC RX REV CODE- 250/636: Performed by: INTERNAL MEDICINE

## 2020-08-20 PROCEDURE — 74011250637 HC RX REV CODE- 250/637: Performed by: INTERNAL MEDICINE

## 2020-08-20 PROCEDURE — 36415 COLL VENOUS BLD VENIPUNCTURE: CPT

## 2020-08-20 PROCEDURE — 77010033678 HC OXYGEN DAILY

## 2020-08-20 PROCEDURE — 82962 GLUCOSE BLOOD TEST: CPT

## 2020-08-20 PROCEDURE — 85025 COMPLETE CBC W/AUTO DIFF WBC: CPT

## 2020-08-20 PROCEDURE — 94640 AIRWAY INHALATION TREATMENT: CPT

## 2020-08-20 PROCEDURE — 80048 BASIC METABOLIC PNL TOTAL CA: CPT

## 2020-08-20 PROCEDURE — 94660 CPAP INITIATION&MGMT: CPT

## 2020-08-20 RX ORDER — PREDNISONE 10 MG/1
30 TABLET ORAL 2 TIMES DAILY
Qty: 24 TAB | Refills: 0 | Status: SHIPPED | OUTPATIENT
Start: 2020-08-20 | End: 2020-08-24

## 2020-08-20 RX ORDER — SPIRONOLACTONE 25 MG/1
25 TABLET ORAL DAILY
Qty: 30 TAB | Refills: 0 | Status: SHIPPED | OUTPATIENT
Start: 2020-08-21

## 2020-08-20 RX ORDER — PREDNISONE 20 MG/1
40 TABLET ORAL 2 TIMES DAILY WITH MEALS
Status: DISCONTINUED | OUTPATIENT
Start: 2020-08-20 | End: 2020-08-20 | Stop reason: HOSPADM

## 2020-08-20 RX ADMIN — IPRATROPIUM BROMIDE AND ALBUTEROL SULFATE 3 ML: .5; 3 SOLUTION RESPIRATORY (INHALATION) at 16:01

## 2020-08-20 RX ADMIN — SPIRONOLACTONE 25 MG: 25 TABLET ORAL at 09:37

## 2020-08-20 RX ADMIN — METOPROLOL TARTRATE 25 MG: 25 TABLET, FILM COATED ORAL at 09:25

## 2020-08-20 RX ADMIN — ASPIRIN 81 MG CHEWABLE TABLET 81 MG: 81 TABLET CHEWABLE at 09:25

## 2020-08-20 RX ADMIN — NYSTATIN: 100000 CREAM TOPICAL at 09:39

## 2020-08-20 RX ADMIN — FLUTICASONE PROPIONATE 2 SPRAY: 50 SPRAY, METERED NASAL at 09:38

## 2020-08-20 RX ADMIN — PREDNISONE 40 MG: 20 TABLET ORAL at 17:08

## 2020-08-20 RX ADMIN — BUDESONIDE 250 MCG: 0.25 INHALANT RESPIRATORY (INHALATION) at 08:27

## 2020-08-20 RX ADMIN — IPRATROPIUM BROMIDE AND ALBUTEROL SULFATE 3 ML: .5; 3 SOLUTION RESPIRATORY (INHALATION) at 08:23

## 2020-08-20 RX ADMIN — ARFORMOTEROL TARTRATE 15 MCG: 15 SOLUTION RESPIRATORY (INHALATION) at 08:27

## 2020-08-20 RX ADMIN — ENOXAPARIN SODIUM 40 MG: 40 INJECTION SUBCUTANEOUS at 09:24

## 2020-08-20 RX ADMIN — LORATADINE 10 MG: 10 TABLET ORAL at 09:25

## 2020-08-20 RX ADMIN — PANTOPRAZOLE SODIUM 40 MG: 40 TABLET, DELAYED RELEASE ORAL at 09:25

## 2020-08-20 RX ADMIN — INSULIN GLARGINE 20 UNITS: 100 INJECTION, SOLUTION SUBCUTANEOUS at 09:23

## 2020-08-20 RX ADMIN — FUROSEMIDE 40 MG: 10 INJECTION, SOLUTION INTRAMUSCULAR; INTRAVENOUS at 09:26

## 2020-08-20 RX ADMIN — INSULIN LISPRO 3 UNITS: 100 INJECTION, SOLUTION INTRAVENOUS; SUBCUTANEOUS at 15:48

## 2020-08-20 RX ADMIN — Medication 10 ML: at 15:50

## 2020-08-20 RX ADMIN — Medication 10 ML: at 06:00

## 2020-08-20 RX ADMIN — IPRATROPIUM BROMIDE AND ALBUTEROL SULFATE 3 ML: .5; 3 SOLUTION RESPIRATORY (INHALATION) at 11:47

## 2020-08-20 RX ADMIN — INSULIN LISPRO 2 UNITS: 100 INJECTION, SOLUTION INTRAVENOUS; SUBCUTANEOUS at 11:56

## 2020-08-20 RX ADMIN — METOPROLOL TARTRATE 25 MG: 25 TABLET, FILM COATED ORAL at 17:08

## 2020-08-20 RX ADMIN — ACETAMINOPHEN 650 MG: 325 TABLET ORAL at 09:25

## 2020-08-20 RX ADMIN — PREDNISONE 40 MG: 20 TABLET ORAL at 09:25

## 2020-08-20 NOTE — PROGRESS NOTES
[address] END OF SHIFT REPORT      Bedside and Verbal shift change report GIVEN TO Alexandria Riddle RN. Report included the following information SBAR and Kardex. SIGNIFICANT CHANGES DURING SHIFT:  None      CONCERNS TO ADDRESS WITH MD:  None        [facility] NURSING NOTE   Admission Date 8/17/2020   Admission Diagnosis COPD exacerbation ([facility] [J44.1]   Consults IP CONSULT TO PULMONOLOGY      Cardiac Monitoring [x] Yes [] No      Purposeful Hourly Rounding [x] Yes    Yu Score Total Score: 1   Yu score 3 or > [] Bed Alarm [] Avasys [] 1:1 sitter [] Patient refused (Signed refusal form in chart)   Jeffrey Score Jeffrey Score: 19   Jeffrey score 14 or < [] PMT consult [] Wound Care consult    []  Specialty bed  [] Nutrition consult      Influenza Vaccine Received Flu Vaccine for Current Season (usually Sept-March): Not Flu Season           Oxygen needs? [] Room air Oxygen @  []1L    []2L    []3L   []4L    [x]5L   []6L via  NC   Chronic home O2 use? [x] Yes [] No  Perform O2 challenge test and document in progress note using smartMoodswinge (.Homeoxygen)      Last bowel movement Last Bowel Movement Date: 08/16/20      Urinary Catheter             LDAs               Peripheral IV 08/17/20 Left Antecubital (Active)   Site Assessment Clean, dry, & intact 08/20/20 0400   Phlebitis Assessment 0 08/20/20 0400   Infiltration Assessment 0 08/20/20 0400   Dressing Status Clean, dry, & intact 08/20/20 0400   Dressing Type Tape;Transparent 08/20/20 0400   Hub Color/Line Status Pink;Flushed 08/20/20 0400                         Readmission Risk Assessment Tool Score High Risk            23       Total Score        3 Has Seen PCP in Last 6 Months (Yes=3, No=0)    4 IP Visits Last 12 Months (1-3=4, 4=9, >4=11)    9 Pt. Coverage (Medicare=5 , Medicaid, or Self-Pay=4)    7 Charlson Comorbidity Score (Age + Comorbid Conditions)        Criteria that do not apply:    . Living with Significant Other. Assisted Living. LTAC. SNF.  or   Rehab Patient Length of Stay (>5 days = 3)       Expected Length of Stay 3d 14h   Actual Length of Stay 3

## 2020-08-20 NOTE — PROGRESS NOTES
ADULT PROTOCOL: JET AEROSOL  REASSESSMENT    Patient  Dinh Mohamud     52 y.o.   female     8/20/2020  10:27 AM    Breath Sounds Pre Procedure: Right Breath Sounds: Diminished                               Left Breath Sounds: Diminished    Breath Sounds Post Procedure: Right Breath Sounds: Diminished                                 Left Breath Sounds: Diminished    Breathing pattern: Pre procedure Breathing Pattern: Regular          Post procedure Breathing Pattern: Regular    Heart Rate: Pre procedure Pulse: 60           Post procedure Pulse: 66    Resp Rate: Pre procedure Respirations: 20           Post procedure Respirations: 20            Cough: Pre procedure Cough: Non-productive               Post procedure Cough: Non-productive    Oxygen: O2 Device: Nasal cannula   Flow rate (L/min) 5 lpm     Changed: NO    SpO2: Pre procedure SpO2: 93 %   with oxygen              Post procedure SpO2: 92 %  with oxygen    Nebulizer Therapy: Current medications Aerosolized Medications: Brovana, Pulmicort/ QID Duoneb      Changed: NO        Problem List:   Patient Active Problem List   Diagnosis Code    Malignant essential hypertension I10    Asthma J45.909    Obesity hypoventilation syndrome ([facility]) E66.2    Dyspnea R06.00    Chest pressure R07.89    Acute on chronic diastolic heart failure ([facility]) I50.33    S/P PTCA (percutaneous transluminal coronary angioplasty) Z98.61    Coronary atherosclerosis of native coronary artery I25.10    Hypoxia R09.02    Noncompliance with therapeutic plan Z91.11    Chest pain R07.9    GERD (gastroesophageal reflux disease) K21.9    Acute respiratory failure with hypoxia and hypercarbia ([facility]) J96.01, J96.02    COPD (chronic obstructive pulmonary disease) ([facility]) J44.9    Tobacco abuse Z72.0    BMI 60.0-69.9, adult ([facility]) Z68.44    Cellulitis L03.90    SIRS due to infectious process with acute organ dysfunction ([facility]) A41.9, R65.20    Sepsis associated hypotension ([facility]) A41.9, I95.9  Gangrenous toe (HCC) I96    Type II diabetes mellitus, uncontrolled ([facility] [address] E11.65    HTN (hypertension) F85    Diastolic CHF, chronic (HCC) I50.32    Cough with hemoptysis R04.2    Lymphedema of both lower extremities I89.0    CAP (community acquired pneumonia) J18.9    Cellulitis, leg L03.119    COPD exacerbation ([facility]) J44.1    Maggot infestation B87.9    Shortness of breath R06.02    Multifocal pneumonia J18.9    Acute respiratory failure (HCC) J96.00    Acute on chronic respiratory failure with hypoxia and hypercapnia (HCC) J96.21, J96.22    NSVT (nonsustained ventricular tachycardia) ([facility]) I47.2    Respiratory failure (HCC) J96.90    Elevated lipase R74.8    Abdominal pain R10.9    Counseling regarding goals of care Z71.89    Acute pancreatitis K85.90    Breast cancer (HCC) C50.919    Intertrigo L30.4    PNA (pneumonia) J18.9    Productive cough R05    Other fatigue R53.83    Acute chest pain R07.9    Acute on chronic respiratory failure with hypoxia (HCC) J96.21    Acute on chronic respiratory failure with hypercapnia (HCC) J96.22    Hypercapnic respiratory failure (HCC) J96.92       Respiratory Therapist: Nando Martin, RT

## 2020-08-20 NOTE — DISCHARGE SUMMARY
Hospitalist Discharge Summary     Patient ID:  Veronica Roles  [id number]  75 y.o.  1970 8/17/2020    PCP on record: Kem Watt MD    Admit date: 8/17/2020  Discharge date and time: 8/20/2020    DISCHARGE DIAGNOSIS:    Acute on chronic hypoxic and hypercapnic respiratory failure/COPD exacerbation  Acute on chronic diastolic heart failure  Super morbid obesity  OHS,  Chronic systolic HF  HTN  CAD  S3BU    CONSULTATIONS:  IP CONSULT TO PULMONOLOGY    Excerpted HPI from H&P of Nicolas Fung MD:  Radha Avila is a 52 y.o.  female with PMHx significant for T2DM, COPD, HTN, CAD, hx of SVT, present to the ER c/o ~4 days of SOB that has been getting worst.  Pt at baseline uses triology, states she feels her bipap setting at home is not working as she remains SOB while using it. Pt tried to get an appnt with her doctor for triology adjustment but was unable to get an appnt soon enough. Her baseline SOB has been worst in the last 4 days which lead her to the ER for evaluation. Other associated symptoms includes diaphoresis and congestion. She denies any association to fever, chills, cough, cp, palpitations, n/v/d. Pt states she called dispatch health 2 days ago and she was started on PO steroids and had Covid19 checked last week which was negative. Pt states she has been compliance with prednisone however symptoms progressively worsens which lead her to the ER for further evaluation. Vitals/images/labs reviewed    ______________________________________________________________________  DISCHARGE SUMMARY/HOSPITAL COURSE:  for full details see H&P, daily progress notes, labs, consult notes. Acute on chronic hypoxic and hypercapnic respiratory failure/COPD exacerbation  Acute on chronic diastolic heart failure  Super morbid obesity  OHS, is suppose to use trilogy at home, not using constantly.   CXR neg for acute process.    COVID-19 negative  VBG reviewed    -She likely had a diastolic heart failure secondary to OHS, morbid obesity, and noncompliance with the Triology.  -She was diuresed with IV Lasix and Diamox, she lost 12 kg during this hospital admission, she is back to her baseline oxygen requirement  -Discussed importance of using Triology clearly, pulmonary has seen the patient, Dr. Darline Zavala will follow-up on her settings of the machine. Also discussed importance of healthy lifestyle including no added salt in the diet and limit intake of sodas  She needs a close follow-up with the PCP, discussed with her, will ask  to make a follow-up appointment. Added spironolactone    duonebs prn, advair          Chronic systolic HF  HTN  CAD     Cont' PO lasix, BB, statin, strict I&O     T2DM  Lower dose lantus  SSI       L2 sclerotic lesion/history of breast cancer  outpatient follow-up with her oncologist        _______________________________________________________________________  Patient seen and examined by me on discharge day. Pertinent Findings:  Gen:    Not in distress  Chest: Clear lungs  CVS:   Regular rhythm. Bilateral lymphedema  Abd:  Soft, not distended, not tender  Neuro:  Alert, oriented  _______________________________________________________________________  DISCHARGE MEDICATIONS:   Current Discharge Medication List      START taking these medications    Details   spironolactone (ALDACTONE) 25 mg tablet Take 1 Tab by mouth daily. Qty: 30 Tab, Refills: 0         CONTINUE these medications which have CHANGED    Details   predniSONE (DELTASONE) 10 mg tablet Take 30 mg by mouth two (2) times a day for 4 days. Qty: 24 Tab, Refills: 0         CONTINUE these medications which have NOT CHANGED    Details   cyclobenzaprine (FLEXERIL) 5 mg tablet Take 5 mg by mouth two (2) times a day. fluticasone-umeclidinium-vilanterol (Trelegy Ellipta) 100-62.5-25 mcg inhaler Take 1 Puff by inhalation daily.       NovoLOG Flexpen U-100 Insulin 100 unit/mL (3 mL) inpn 5-10 Units by SubCUTAneous route as directed. Sliding scale: -300: 5 units -400 units: 10 units      ammonium lactate (LAC-HYDRIN) 12 % lotion Apply 1 Applicator to affected area as needed. rub in to affected area well      omeprazole (PRILOSEC) 40 mg capsule Take 40 mg by mouth daily. nystatin (MYCOSTATIN) topical cream Apply  to affected area two (2) times a day. Qty: 15 g, Refills: 0      albuterol-ipratropium (DUO-NEB) 2.5 mg-0.5 mg/3 ml nebu 3 mL by Nebulization route four (4) times daily. Qty: 100 Nebule, Refills: 1      furosemide (LASIX) 40 mg tablet Take 40 mg by mouth two (2) times a day. fluticasone (FLONASE) 50 mcg/actuation nasal spray 2 Sprays by Both Nostrils route daily. Qty: 1 Bottle, Refills: 0      insulin glargine (LANTUS) 100 unit/mL injection 35 Units by SubCUTAneous route daily. metoprolol tartrate (LOPRESSOR) 25 mg tablet Take 25 mg by mouth two (2) times a day. loratadine (CLARITIN) 10 mg tablet Take 10 mg by mouth daily. albuterol (VENTOLIN HFA) 90 mcg/actuation inhaler Take 2 Puffs by inhalation every six (6) hours as needed for Wheezing. aspirin 81 mg chewable tablet Take 81 mg by mouth daily. lovastatin (MEVACOR) 40 mg tablet Take 1 Tab by mouth nightly. Qty: 30 Tab, Refills: 6    Associated Diagnoses: Atherosclerosis of native coronary artery without angina pectoris      glyBURIDE (DIABETA) 5 mg tablet Take 5 mg by mouth two (2) times daily (with meals). nitroglycerin (NITROSTAT) 0.4 mg SL tablet 1 Tab by SubLINGual route every five (5) minutes as needed for Chest Pain (call 911 if not relieved by 3). Qty: 25 Tab, Refills: 2    Associated Diagnoses: SVT (supraventricular tachycardia) ([facility]); CHF (congestive heart failure) (HCC)         STOP taking these medications       ibuprofen (MOTRIN) 800 mg tablet Comments:   Reason for Stopping:                 Patient Follow Up Instructions:    Activity: Activity as tolerated  Diet: Cardiac Diet    Follow-up Information     Follow up With Specialties Details Why Contact Aimee Cisse MD General Practice On 8/25/2020 PCP follow-up appointment with Dr. Onur Alaniz [facility]  [phone]          ________________________________________________________________    Risk of deterioration: Moderate    Condition at Discharge:  Stable  __________________________________________________________________    Disposition- Home    ____________________________________________________________________    Code Status: Full Code  ___________________________________________________________________      Total time in minutes spent coordinating this discharge (includes going over instructions, follow-up, prescriptions, and preparing report for sign off to her PCP) :  40 minutes    Signed:  Sammuel Schwab, MD

## 2020-08-20 NOTE — PROGRESS NOTES
Problem: Falls - Risk of  Goal: *Absence of Falls  Description: Document Clarence Garza Fall Risk and appropriate interventions in the flowsheet. Outcome: Resolved/Met     Problem: Patient Education: Go to Patient Education Activity  Goal: Patient/Family Education  Outcome: Resolved/Met     Problem: Breathing Pattern - Ineffective  Goal: *Absence of hypoxia  Outcome: Resolved/Met  Goal: *Use of effective breathing techniques  Outcome: Resolved/Met  Goal: *PALLIATIVE CARE:  Alleviation of Dyspnea  Outcome: Resolved/Met     Problem: Patient Education: Go to Patient Education Activity  Goal: Patient/Family Education  Outcome: Resolved/Met     Problem: Risk for Spread of Infection  Goal: Prevent transmission of infectious organism to others  Description: Prevent the transmission of infectious organisms to other patients, staff members, and visitors.   Outcome: Resolved/Met     Problem: Patient Education:  Go to Education Activity  Goal: Patient/Family Education  Outcome: Resolved/Met     Problem: Chronic Obstructive Pulmonary Disease (COPD)  Goal: *Oxygen saturation during activity within specified parameters  Outcome: Resolved/Met  Goal: *Able to remain out of bed as prescribed  Outcome: Resolved/Met  Goal: *Absence of hypoxia  Outcome: Resolved/Met  Goal: *Optimize nutritional status  Outcome: Resolved/Met     Problem: Patient Education: Go to Patient Education Activity  Goal: Patient/Family Education  Outcome: Resolved/Met     Problem: Patient Education: Go to Patient Education Activity  Goal: Patient/Family Education  Outcome: Resolved/Met     Problem: Heart Failure: Day 1  Goal: Off Pathway (Use only if patient is Off Pathway)  Outcome: Resolved/Met  Goal: Activity/Safety  Outcome: Resolved/Met  Goal: Consults, if ordered  Outcome: Resolved/Met  Goal: Diagnostic Test/Procedures  Outcome: Resolved/Met  Goal: Nutrition/Diet  Outcome: Resolved/Met  Goal: Discharge Planning  Outcome: Resolved/Met  Goal: Medications  Outcome: Resolved/Met  Goal: Respiratory  Outcome: Resolved/Met  Goal: Treatments/Interventions/Procedures  Outcome: Resolved/Met  Goal: Psychosocial  Outcome: Resolved/Met  Goal: *Oxygen saturation within defined limits  Outcome: Resolved/Met  Goal: *Hemodynamically stable  Outcome: Resolved/Met  Goal: *Optimal pain control at patient's stated goal  Outcome: Resolved/Met  Goal: *Anxiety reduced or absent  Outcome: Resolved/Met     Problem: Heart Failure: Day 2  Goal: Off Pathway (Use only if patient is Off Pathway)  Outcome: Resolved/Met  Goal: Activity/Safety  Outcome: Resolved/Met  Goal: Consults, if ordered  Outcome: Resolved/Met  Goal: Diagnostic Test/Procedures  Outcome: Resolved/Met  Goal: Nutrition/Diet  Outcome: Resolved/Met  Goal: Discharge Planning  Outcome: Resolved/Met  Goal: Medications  Outcome: Resolved/Met  Goal: Respiratory  Outcome: Resolved/Met  Goal: Treatments/Interventions/Procedures  Outcome: Resolved/Met  Goal: Psychosocial  Outcome: Resolved/Met  Goal: *Oxygen saturation within defined limits  Outcome: Resolved/Met  Goal: *Hemodynamically stable  Outcome: Resolved/Met  Goal: *Optimal pain control at patient's stated goal  Outcome: Resolved/Met  Goal: *Anxiety reduced or absent  Outcome: Resolved/Met  Goal: *Demonstrates progressive activity  Outcome: Resolved/Met     Problem: Heart Failure: Day 3  Goal: Off Pathway (Use only if patient is Off Pathway)  Outcome: Resolved/Met  Goal: Activity/Safety  Outcome: Resolved/Met  Goal: Diagnostic Test/Procedures  Outcome: Resolved/Met  Goal: Nutrition/Diet  Outcome: Resolved/Met  Goal: Discharge Planning  Outcome: Resolved/Met  Goal: Medications  Outcome: Resolved/Met  Goal: Respiratory  Outcome: Resolved/Met  Goal: Treatments/Interventions/Procedures  Outcome: Resolved/Met  Goal: Psychosocial  Outcome: Resolved/Met  Goal: *Oxygen saturation within defined limits  Outcome: Resolved/Met  Goal: *Hemodynamically stable  Outcome: Resolved/Met  Goal: *Optimal pain control at patient's stated goal  Outcome: Resolved/Met  Goal: *Anxiety reduced or absent  Outcome: Resolved/Met  Goal: *Demonstrates progressive activity  Outcome: Resolved/Met     Problem: Heart Failure: Day 4  Goal: Off Pathway (Use only if patient is Off Pathway)  Outcome: Resolved/Met  Goal: Activity/Safety  Outcome: Resolved/Met  Goal: Diagnostic Test/Procedures  Outcome: Resolved/Met  Goal: Nutrition/Diet  Outcome: Resolved/Met  Goal: Discharge Planning  Outcome: Resolved/Met  Goal: Medications  Outcome: Resolved/Met  Goal: Respiratory  Outcome: Resolved/Met  Goal: Treatments/Interventions/Procedures  Outcome: Resolved/Met  Goal: Psychosocial  Outcome: Resolved/Met  Goal: *Oxygen saturation within defined limits  Outcome: Resolved/Met  Goal: *Hemodynamically stable  Outcome: Resolved/Met  Goal: *Optimal pain control at patient's stated goal  Outcome: Resolved/Met  Goal: *Anxiety reduced or absent  Outcome: Resolved/Met  Goal: *Demonstrates progressive activity  Outcome: Resolved/Met     Problem: Heart Failure: Day 5  Goal: Off Pathway (Use only if patient is Off Pathway)  Outcome: Resolved/Met  Goal: Activity/Safety  Outcome: Resolved/Met  Goal: Diagnostic Test/Procedures  Outcome: Resolved/Met  Goal: Nutrition/Diet  Outcome: Resolved/Met  Goal: Discharge Planning  Outcome: Resolved/Met  Goal: Medications  Outcome: Resolved/Met  Goal: Respiratory  Outcome: Resolved/Met  Goal: Treatments/Interventions/Procedures  Outcome: Resolved/Met  Goal: Psychosocial  Outcome: Resolved/Met     Problem: Heart Failure: Discharge Outcomes  Goal: *Demonstrates ability to perform prescribed activity without shortness of breath or discomfort  Outcome: Resolved/Met  Goal: *Left ventricular function assessment completed prior to or during stay, or planned for post-discharge  Outcome: Resolved/Met  Goal: *ACEI prescribed if LVEF less than 40% and no contraindications or ARB prescribed  Outcome: Resolved/Met  Goal: *Verbalizes understanding and describes prescribed diet  Outcome: Resolved/Met  Goal: *Verbalizes understanding/describes prescribed medications  Outcome: Resolved/Met  Goal: *Describes available resources and support systems  Description: (eg: Home Health, Palliative Care, Advanced Medical Directive)  Outcome: Resolved/Met  Goal: *Describes smoking cessation resources  Outcome: Resolved/Met  Goal: *Understands and describes signs and symptoms to report to providers(Stroke Metric)  Outcome: Resolved/Met  Goal: *Describes/verbalizes understanding of follow-up/return appt  Description: (eg: to physicians, diabetes treatment coordinator, and other resources  Outcome: Resolved/Met  Goal: *Describes importance of continuing daily weights and changes to report to physician  Outcome: Resolved/Met

## 2020-08-20 NOTE — PROGRESS NOTES
LETICIA: Home with Follow-up Appointment    3:26pm-No further CM needs identified. CM notified pt's nurse of d/c.    3:21pm- CM met with pt at bedside to discuss d/c plan. CM inquired with pt about any needs. No new needs at this time. CM informed pt of transportation time. 3:21pm-CM set up medical transport with [facility] via [employer].  time 5:45pm. [facility] paperwork placed in pt's chart. 2:40pm- CM sent referral to [facility] requesting  time for 3:30pm    2:30pm- CM called Dr. Nurys Katz office to schedule pt's follow-up appointment. Appointment scheduled for 8/25/2020 with Dr. Viola Mccabe.     Care Management Interventions  PCP Verified by CM: Yes  Palliative Care Criteria Met (RRAT>21 & CHF Dx)?: No  Mode of Transport at Discharge: BLS(Pt will be transported by medical transport)  Hospital Transport Time of Discharge: [facility] (CM Consult):  Other(Home with follow-up appointments)  Discharge Durable Medical Equipment: No  Physical Therapy Consult: No  Occupational Therapy Consult: No  Speech Therapy Consult: No  Current Support Network: Own Home, Lives Alone  Confirm Follow Up Transport: Other (see comment)  [address] Provided?: No  Discharge Location  Discharge Placement: Home(Home with follow-up appointments)      Brianna Cartwright [address] [place] [address] [facility]  [phone]

## 2020-08-20 NOTE — PROGRESS NOTES
DISCHARGE SUMMARY FROM NURSE    The patient is stable for discharge. I have reviewed the discharge instructions with the patient. The patient verbalized understanding. All questions were fully answered. The patient verbalized no complaints. Hard scripts and medication handouts were given and reviewed with the patient. Appropriate educational materials and medication side effects teaching were provided also provided. Cardiac monitor and IV line(s) were removed. The following personal items collected during your admission were returned to the patient/family  Home medications: None  Dental Appliance: Dental Appliances: None  Vision: Visual Aid: Glasses, With patient  Hearing Aid:    Jewelry: Jewelry: None  Clothing: Clothing: At bedside, Footwear, Pants, With patient, Shirt, Undergarments  Other Valuables: Other Valuables: Cell Phone  Valuables sent to safe:      OR _________________________________________________________________________________________    There were no personal belongings, valuables or home medications left at patient's bedside,  or safe.

## 2020-08-20 NOTE — PROGRESS NOTES
Problem: Falls - Risk of  Goal: *Absence of Falls  Description: Document Sonia Tracey Fall Risk and appropriate interventions in the flowsheet.   Outcome: Progressing Towards Goal  Note: Fall Risk Interventions:            Medication Interventions: Patient to call before getting OOB, Teach patient to arise slowly    Elimination Interventions: Patient to call for help with toileting needs

## 2020-08-20 NOTE — PROGRESS NOTES
Bedside and Verbal shift change report given to Kimberly Clancy RN (oncoming nurse) by Gentry Antony RN/NORMA Allen (offgoing nurse). Report included the following information SBAR and Kardex.

## 2020-08-21 ENCOUNTER — TELEPHONE (OUTPATIENT)
Dept: CASE MANAGEMENT | Age: 50
End: 2020-08-21

## 2020-08-21 NOTE — TELEPHONE ENCOUNTER
20 10:12 AM--attempted to reach patient but she is on another call right now and cannot talk. She is agreeable to my calling back later. 20 12:56 PM    Patient contacted regarding recent discharge and COVID-19 risk. Discussed COVID-19 related testing which was available at this time. Test results were negative. Patient informed of results, if available? no    Care Transition Nurse/ Ambulatory Care Manager/ LPN Care Coordinator contacted the patient by telephone to perform post discharge assessment. Verified name and  with patient as identifiers. Patient has following risk factors of: heart failure, COPD, asthma, pneumonia, acute respiratory failure and diabetes. CTN/ACM/LPN reviewed discharge instructions, medical action plan and red flags related to discharge diagnosis. Patient has an incoming call and no longer to talk today. This will end this conversation for now and I will make one final call back in two weeks. Advance Care Planning:   Does patient have an Advance Directive: has on file, not reviewed today     Education not provided regarding infection prevention, and signs and symptoms of COVID-19 and when to seek medical attention because patient who does not have any more time to talk. From CDC: Are you at higher risk for severe illness?  Wash your hands often.  Avoid close contact (6 feet, which is about two arm lengths) with people who are sick.  Put distance between yourself and other people if COVID-19 is spreading in your community.  Clean and disinfect frequently touched surfaces.  Avoid all cruise travel and non-essential air travel.  Call your healthcare professional if you have concerns about COVID-19 and your underlying condition or if you are sick. For more information on steps you can take to protect yourself, see CDC's How to [address] to help patient activate MyChart but she declines.     Reviewed and updated health care decision maker information while on phone with patient. Patient/family/caregiver given information for [employer] and agrees to enroll no, declined  Patient's preferred e-mail:  N/A  Patient's preferred phone number: N/A  Based on Loop alert triggers, patient will be contacted by nurse care manager for worsening symptoms. Plan for follow-up call in 14 days based on severity of symptoms and risk factors.

## 2020-08-26 ENCOUNTER — TELEPHONE (OUTPATIENT)
Dept: SLEEP MEDICINE | Age: 50
End: 2020-08-26

## 2020-08-26 NOTE — TELEPHONE ENCOUNTER
Walker rep from [employer] came into office today requesting a letter of discontinuation for patient, she has told [employer] she wants to return trilogy machine

## 2020-08-27 NOTE — TELEPHONE ENCOUNTER
Patient told me at her last virtual visit that she wants to use the device, schedule office visit to discuss. If patient is refusing to use the device she may sign a AMA document.

## 2020-08-28 NOTE — TELEPHONE ENCOUNTER
Sent message to walker stating patient advised  she would use device, he will check usage and get back with me to discuss

## 2020-09-08 ENCOUNTER — TELEPHONE (OUTPATIENT)
Dept: CASE MANAGEMENT | Age: 50
End: 2020-09-08

## 2021-02-18 ENCOUNTER — HOSPITAL ENCOUNTER (INPATIENT)
Age: 51
LOS: 6 days | Discharge: HOME | DRG: 177 | End: 2021-02-24
Attending: EMERGENCY MEDICINE | Admitting: INTERNAL MEDICINE
Payer: MEDICARE

## 2021-02-18 ENCOUNTER — HOSPITAL ENCOUNTER (EMERGENCY)
Age: 51
Discharge: ANOTHER HOSPITAL | End: 2021-02-18
Attending: EMERGENCY MEDICINE
Payer: MEDICARE

## 2021-02-18 ENCOUNTER — APPOINTMENT (OUTPATIENT)
Dept: GENERAL RADIOLOGY | Age: 51
DRG: 177 | End: 2021-02-18
Attending: EMERGENCY MEDICINE
Payer: MEDICARE

## 2021-02-18 ENCOUNTER — APPOINTMENT (OUTPATIENT)
Dept: GENERAL RADIOLOGY | Age: 51
End: 2021-02-18
Attending: NURSE PRACTITIONER
Payer: MEDICARE

## 2021-02-18 VITALS
BODY MASS INDEX: 45.99 KG/M2 | RESPIRATION RATE: 20 BRPM | WEIGHT: 293 LBS | DIASTOLIC BLOOD PRESSURE: 88 MMHG | HEART RATE: 112 BPM | SYSTOLIC BLOOD PRESSURE: 119 MMHG | TEMPERATURE: 98.2 F | OXYGEN SATURATION: 100 % | HEIGHT: 67 IN

## 2021-02-18 DIAGNOSIS — U07.1 COVID-19: Primary | ICD-10-CM

## 2021-02-18 DIAGNOSIS — J44.1 CHRONIC OBSTRUCTIVE PULMONARY DISEASE WITH ACUTE EXACERBATION (HCC): ICD-10-CM

## 2021-02-18 DIAGNOSIS — U07.1 ACUTE RESPIRATORY FAILURE DUE TO COVID-19 (HCC): Primary | ICD-10-CM

## 2021-02-18 DIAGNOSIS — J96.22 ACUTE ON CHRONIC RESPIRATORY FAILURE WITH HYPERCAPNIA (HCC): ICD-10-CM

## 2021-02-18 DIAGNOSIS — J96.00 ACUTE RESPIRATORY FAILURE DUE TO COVID-19 (HCC): Primary | ICD-10-CM

## 2021-02-18 PROBLEM — J96.01 ACUTE RESPIRATORY FAILURE WITH HYPOXIA (HCC): Status: ACTIVE | Noted: 2021-02-18

## 2021-02-18 LAB
ALBUMIN SERPL-MCNC: 3.4 G/DL (ref 3.5–5)
ALBUMIN/GLOB SERPL: 0.6 {RATIO} (ref 1.1–2.2)
ALP SERPL-CCNC: 105 U/L (ref 45–117)
ALT SERPL-CCNC: 39 U/L (ref 12–78)
ANION GAP SERPL CALC-SCNC: 3 MMOL/L (ref 5–15)
ARTERIAL PATENCY WRIST A: YES
AST SERPL-CCNC: 29 U/L (ref 15–37)
BASE EXCESS BLDA CALC-SCNC: 2.5 MMOL/L
BASE EXCESS BLDA CALC-SCNC: 3.9 MMOL/L
BASE EXCESS BLDA CALC-SCNC: 4.5 MMOL/L
BASOPHILS # BLD: 0 K/UL (ref 0–0.1)
BASOPHILS NFR BLD: 0 % (ref 0–1)
BDY SITE: ABNORMAL
BILIRUB SERPL-MCNC: 0.4 MG/DL (ref 0.2–1)
BNP SERPL-MCNC: 255 PG/ML (ref 0–125)
BREATHS.SPONTANEOUS ON VENT: 22
BUN SERPL-MCNC: 21 MG/DL (ref 6–20)
BUN/CREAT SERPL: 21 (ref 12–20)
CA-I BLD-SCNC: 1.47 MMOL/L (ref 1.13–1.32)
CALCIUM SERPL-MCNC: 10.8 MG/DL (ref 8.5–10.1)
CHLORIDE SERPL-SCNC: 102 MMOL/L (ref 97–108)
CO2 SERPL-SCNC: 35 MMOL/L (ref 21–32)
COVID-19 RAPID TEST, COVR: DETECTED
CREAT SERPL-MCNC: 0.98 MG/DL (ref 0.55–1.02)
DIFFERENTIAL METHOD BLD: ABNORMAL
EOSINOPHIL # BLD: 0.1 K/UL (ref 0–0.4)
EOSINOPHIL NFR BLD: 1 % (ref 0–7)
EPAP/CPAP/PEEP, PAPEEP: 6
ERYTHROCYTE [DISTWIDTH] IN BLOOD BY AUTOMATED COUNT: 12.8 % (ref 11.5–14.5)
FIO2 ON VENT: 40 %
FIO2 ON VENT: 55 %
GAS FLOW.O2 O2 DELIVERY SYS: 5 L/MIN
GLOBULIN SER CALC-MCNC: 5.4 G/DL (ref 2–4)
GLUCOSE BLD STRIP.AUTO-MCNC: 187 MG/DL (ref 65–100)
GLUCOSE SERPL-MCNC: 162 MG/DL (ref 65–100)
HCO3 BLDA-SCNC: 31 MMOL/L (ref 22–26)
HCO3 BLDA-SCNC: 33 MMOL/L (ref 22–26)
HCO3 BLDA-SCNC: 35 MMOL/L (ref 22–26)
HCT VFR BLD AUTO: 49 % (ref 35–47)
HGB BLD-MCNC: 14.8 G/DL (ref 11.5–16)
IMM GRANULOCYTES # BLD AUTO: 0 K/UL
IMM GRANULOCYTES NFR BLD AUTO: 0 %
IPAP/PIP, IPAPIP: 16
IPAP/PIP, IPAPIP: 16
LYMPHOCYTES # BLD: 0.6 K/UL (ref 0.8–3.5)
LYMPHOCYTES NFR BLD: 7 % (ref 12–49)
MCH RBC QN AUTO: 28.2 PG (ref 26–34)
MCHC RBC AUTO-ENTMCNC: 30.2 G/DL (ref 30–36.5)
MCV RBC AUTO: 93.3 FL (ref 80–99)
MONOCYTES # BLD: 1.2 K/UL (ref 0–1)
MONOCYTES NFR BLD: 14 % (ref 5–13)
NEUTS BAND NFR BLD MANUAL: 3 % (ref 0–6)
NEUTS SEG # BLD: 6.9 K/UL (ref 1.8–8)
NEUTS SEG NFR BLD: 75 % (ref 32–75)
NRBC # BLD: 0 K/UL (ref 0–0.01)
NRBC BLD-RTO: 0 PER 100 WBC
PCO2 BLDA: 66 MMHG (ref 35–45)
PCO2 BLDA: 68 MMHG (ref 35–45)
PCO2 BLDA: 79 MMHG (ref 35–45)
PEEP RESPIRATORY: 6 CM[H2O]
PH BLDA: 7.26 [PH] (ref 7.35–7.45)
PH BLDA: 7.29 [PH] (ref 7.35–7.45)
PH BLDA: 7.3 [PH] (ref 7.35–7.45)
PLATELET # BLD AUTO: 111 K/UL (ref 150–400)
PMV BLD AUTO: 11.1 FL (ref 8.9–12.9)
PO2 BLDA: 101 MMHG (ref 80–100)
PO2 BLDA: 78 MMHG (ref 80–100)
PO2 BLDA: 88 MMHG (ref 80–100)
POTASSIUM SERPL-SCNC: 4.4 MMOL/L (ref 3.5–5.1)
PROT SERPL-MCNC: 8.8 G/DL (ref 6.4–8.2)
RBC # BLD AUTO: 5.25 M/UL (ref 3.8–5.2)
RBC MORPH BLD: ABNORMAL
SAO2 % BLD: 94 % (ref 92–97)
SAO2 % BLD: 97 % (ref 92–97)
SAO2 % BLD: 98 % (ref 92–97)
SAO2% DEVICE SAO2% SENSOR NAME: ABNORMAL
SARS-COV-2, COV2: NORMAL
SERVICE CMNT-IMP: ABNORMAL
SODIUM SERPL-SCNC: 140 MMOL/L (ref 136–145)
SOURCE, COVRS: ABNORMAL
SPECIMEN SITE: ABNORMAL
TROPONIN I SERPL-MCNC: <0.05 NG/ML
VENTILATION MODE VENT: ABNORMAL
WBC # BLD AUTO: 8.8 K/UL (ref 3.6–11)

## 2021-02-18 PROCEDURE — 87635 SARS-COV-2 COVID-19 AMP PRB: CPT

## 2021-02-18 PROCEDURE — 36600 WITHDRAWAL OF ARTERIAL BLOOD: CPT

## 2021-02-18 PROCEDURE — 65660000000 HC RM CCU STEPDOWN

## 2021-02-18 PROCEDURE — 83880 ASSAY OF NATRIURETIC PEPTIDE: CPT

## 2021-02-18 PROCEDURE — 74011250636 HC RX REV CODE- 250/636: Performed by: NURSE PRACTITIONER

## 2021-02-18 PROCEDURE — 2709999900 HC NON-CHARGEABLE SUPPLY

## 2021-02-18 PROCEDURE — 77030012341 HC CHMB SPCR OPTC MDI VYRM -A

## 2021-02-18 PROCEDURE — 71045 X-RAY EXAM CHEST 1 VIEW: CPT

## 2021-02-18 PROCEDURE — 82803 BLOOD GASES ANY COMBINATION: CPT

## 2021-02-18 PROCEDURE — 36415 COLL VENOUS BLD VENIPUNCTURE: CPT

## 2021-02-18 PROCEDURE — 82962 GLUCOSE BLOOD TEST: CPT

## 2021-02-18 PROCEDURE — 94660 CPAP INITIATION&MGMT: CPT

## 2021-02-18 PROCEDURE — 96374 THER/PROPH/DIAG INJ IV PUSH: CPT

## 2021-02-18 PROCEDURE — 93005 ELECTROCARDIOGRAM TRACING: CPT

## 2021-02-18 PROCEDURE — 99285 EMERGENCY DEPT VISIT HI MDM: CPT

## 2021-02-18 PROCEDURE — 80053 COMPREHEN METABOLIC PANEL: CPT

## 2021-02-18 PROCEDURE — 5A09557 ASSISTANCE WITH RESPIRATORY VENTILATION, GREATER THAN 96 CONSECUTIVE HOURS, CONTINUOUS POSITIVE AIRWAY PRESSURE: ICD-10-PCS | Performed by: GENERAL ACUTE CARE HOSPITAL

## 2021-02-18 PROCEDURE — 80051 ELECTROLYTE PANEL: CPT

## 2021-02-18 PROCEDURE — 84484 ASSAY OF TROPONIN QUANT: CPT

## 2021-02-18 PROCEDURE — 77010033678 HC OXYGEN DAILY

## 2021-02-18 PROCEDURE — 85025 COMPLETE CBC W/AUTO DIFF WBC: CPT

## 2021-02-18 PROCEDURE — 74011250637 HC RX REV CODE- 250/637: Performed by: INTERNAL MEDICINE

## 2021-02-18 PROCEDURE — 74011250636 HC RX REV CODE- 250/636: Performed by: INTERNAL MEDICINE

## 2021-02-18 RX ORDER — ATORVASTATIN CALCIUM 10 MG/1
10 TABLET, FILM COATED ORAL DAILY
Status: DISCONTINUED | OUTPATIENT
Start: 2021-02-19 | End: 2021-02-24 | Stop reason: HOSPADM

## 2021-02-18 RX ORDER — ONDANSETRON 2 MG/ML
4 INJECTION INTRAMUSCULAR; INTRAVENOUS
Status: DISCONTINUED | OUTPATIENT
Start: 2021-02-18 | End: 2021-02-24 | Stop reason: HOSPADM

## 2021-02-18 RX ORDER — FLUTICASONE PROPIONATE 50 MCG
2 SPRAY, SUSPENSION (ML) NASAL DAILY
Status: DISCONTINUED | OUTPATIENT
Start: 2021-02-19 | End: 2021-02-24 | Stop reason: HOSPADM

## 2021-02-18 RX ORDER — GUAIFENESIN 100 MG/5ML
81 LIQUID (ML) ORAL DAILY
Status: DISCONTINUED | OUTPATIENT
Start: 2021-02-19 | End: 2021-02-24 | Stop reason: HOSPADM

## 2021-02-18 RX ORDER — MAGNESIUM SULFATE 100 %
4 CRYSTALS MISCELLANEOUS AS NEEDED
Status: DISCONTINUED | OUTPATIENT
Start: 2021-02-18 | End: 2021-02-24 | Stop reason: HOSPADM

## 2021-02-18 RX ORDER — DEXAMETHASONE SODIUM PHOSPHATE 4 MG/ML
6 INJECTION, SOLUTION INTRA-ARTICULAR; INTRALESIONAL; INTRAMUSCULAR; INTRAVENOUS; SOFT TISSUE EVERY 24 HOURS
Status: DISCONTINUED | OUTPATIENT
Start: 2021-02-18 | End: 2021-02-24 | Stop reason: HOSPADM

## 2021-02-18 RX ORDER — DEXTROSE 50 % IN WATER (D50W) INTRAVENOUS SYRINGE
12.5-25 AS NEEDED
Status: DISCONTINUED | OUTPATIENT
Start: 2021-02-18 | End: 2021-02-24 | Stop reason: HOSPADM

## 2021-02-18 RX ORDER — ENOXAPARIN SODIUM 100 MG/ML
30 INJECTION SUBCUTANEOUS EVERY 12 HOURS
Status: DISCONTINUED | OUTPATIENT
Start: 2021-02-18 | End: 2021-02-19

## 2021-02-18 RX ORDER — INSULIN LISPRO 100 [IU]/ML
INJECTION, SOLUTION INTRAVENOUS; SUBCUTANEOUS
Status: DISCONTINUED | OUTPATIENT
Start: 2021-02-19 | End: 2021-02-24 | Stop reason: HOSPADM

## 2021-02-18 RX ORDER — PANTOPRAZOLE SODIUM 40 MG/1
40 TABLET, DELAYED RELEASE ORAL
Status: DISCONTINUED | OUTPATIENT
Start: 2021-02-19 | End: 2021-02-24 | Stop reason: HOSPADM

## 2021-02-18 RX ORDER — DEXAMETHASONE SODIUM PHOSPHATE 100 MG/10ML
10 INJECTION INTRAMUSCULAR; INTRAVENOUS
Status: COMPLETED | OUTPATIENT
Start: 2021-02-18 | End: 2021-02-18

## 2021-02-18 RX ORDER — INSULIN GLARGINE 100 [IU]/ML
35 INJECTION, SOLUTION SUBCUTANEOUS DAILY
Status: DISCONTINUED | OUTPATIENT
Start: 2021-02-19 | End: 2021-02-24 | Stop reason: HOSPADM

## 2021-02-18 RX ORDER — SPIRONOLACTONE 25 MG/1
25 TABLET ORAL DAILY
Status: DISCONTINUED | OUTPATIENT
Start: 2021-02-19 | End: 2021-02-24 | Stop reason: HOSPADM

## 2021-02-18 RX ORDER — CYCLOBENZAPRINE HCL 10 MG
5 TABLET ORAL 2 TIMES DAILY
Status: DISCONTINUED | OUTPATIENT
Start: 2021-02-18 | End: 2021-02-21

## 2021-02-18 RX ORDER — CYCLOBENZAPRINE HCL 10 MG
5 TABLET ORAL 2 TIMES DAILY
Status: DISCONTINUED | OUTPATIENT
Start: 2021-02-19 | End: 2021-02-18

## 2021-02-18 RX ORDER — METOPROLOL TARTRATE 25 MG/1
25 TABLET, FILM COATED ORAL 2 TIMES DAILY
Status: DISCONTINUED | OUTPATIENT
Start: 2021-02-19 | End: 2021-02-24 | Stop reason: HOSPADM

## 2021-02-18 RX ORDER — ALBUTEROL SULFATE 90 UG/1
2 AEROSOL, METERED RESPIRATORY (INHALATION)
Status: DISCONTINUED | OUTPATIENT
Start: 2021-02-18 | End: 2021-02-21

## 2021-02-18 RX ORDER — ACETAMINOPHEN 325 MG/1
650 TABLET ORAL
Status: DISCONTINUED | OUTPATIENT
Start: 2021-02-18 | End: 2021-02-24 | Stop reason: HOSPADM

## 2021-02-18 RX ORDER — FUROSEMIDE 40 MG/1
40 TABLET ORAL 2 TIMES DAILY
Status: DISCONTINUED | OUTPATIENT
Start: 2021-02-19 | End: 2021-02-24 | Stop reason: HOSPADM

## 2021-02-18 RX ADMIN — DEXAMETHASONE SODIUM PHOSPHATE 6 MG: 4 INJECTION, SOLUTION INTRA-ARTICULAR; INTRALESIONAL; INTRAMUSCULAR; INTRAVENOUS; SOFT TISSUE at 22:46

## 2021-02-18 RX ADMIN — ENOXAPARIN SODIUM 30 MG: 30 INJECTION SUBCUTANEOUS at 22:46

## 2021-02-18 RX ADMIN — ACETAMINOPHEN 650 MG: 325 TABLET ORAL at 23:15

## 2021-02-18 RX ADMIN — ALBUTEROL SULFATE 2 PUFF: 90 AEROSOL, METERED RESPIRATORY (INHALATION) at 22:45

## 2021-02-18 RX ADMIN — DEXAMETHASONE SODIUM PHOSPHATE 10 MG: 10 INJECTION INTRAMUSCULAR; INTRAVENOUS at 19:42

## 2021-02-18 NOTE — ED TRIAGE NOTES
CC increasing sob x 3 days, had assessment by [facility] yesterday and was told to come to the hospital for CHF exacerbation. On 5L NC at baseline. C/o productive cough with yellow phlegm. Hx of CHF, COPD, morbid obesity.

## 2021-02-18 NOTE — ED NOTES
Plan - Resp called to place on bipap, abgs will be repeated after on hour on bipap and if improved pt will be admitted here; if pt does not improve will transfer.

## 2021-02-19 LAB
ALBUMIN SERPL-MCNC: 3.3 G/DL (ref 3.5–5)
ALBUMIN SERPL-MCNC: 3.4 G/DL (ref 3.5–5)
ALBUMIN/GLOB SERPL: 0.6 {RATIO} (ref 1.1–2.2)
ALBUMIN/GLOB SERPL: 0.6 {RATIO} (ref 1.1–2.2)
ALP SERPL-CCNC: 104 U/L (ref 45–117)
ALP SERPL-CCNC: 110 U/L (ref 45–117)
ALT SERPL-CCNC: 34 U/L (ref 12–78)
ALT SERPL-CCNC: 34 U/L (ref 12–78)
ANION GAP SERPL CALC-SCNC: 5 MMOL/L (ref 5–15)
ANION GAP SERPL CALC-SCNC: 5 MMOL/L (ref 5–15)
AST SERPL-CCNC: 25 U/L (ref 15–37)
AST SERPL-CCNC: 28 U/L (ref 15–37)
ATRIAL RATE: 112 BPM
BASOPHILS # BLD: 0 K/UL (ref 0–0.1)
BASOPHILS # BLD: 0 K/UL (ref 0–0.1)
BASOPHILS NFR BLD: 0 % (ref 0–1)
BASOPHILS NFR BLD: 0 % (ref 0–1)
BILIRUB SERPL-MCNC: 0.4 MG/DL (ref 0.2–1)
BILIRUB SERPL-MCNC: 0.4 MG/DL (ref 0.2–1)
BUN SERPL-MCNC: 26 MG/DL (ref 6–20)
BUN SERPL-MCNC: 29 MG/DL (ref 6–20)
BUN/CREAT SERPL: 23 (ref 12–20)
BUN/CREAT SERPL: 23 (ref 12–20)
CALCIUM SERPL-MCNC: 10.6 MG/DL (ref 8.5–10.1)
CALCIUM SERPL-MCNC: 10.9 MG/DL (ref 8.5–10.1)
CALCULATED P AXIS, ECG09: 72 DEGREES
CALCULATED R AXIS, ECG10: 104 DEGREES
CALCULATED T AXIS, ECG11: 72 DEGREES
CHLORIDE SERPL-SCNC: 100 MMOL/L (ref 97–108)
CHLORIDE SERPL-SCNC: 98 MMOL/L (ref 97–108)
CO2 SERPL-SCNC: 31 MMOL/L (ref 21–32)
CO2 SERPL-SCNC: 32 MMOL/L (ref 21–32)
CREAT SERPL-MCNC: 1.12 MG/DL (ref 0.55–1.02)
CREAT SERPL-MCNC: 1.26 MG/DL (ref 0.55–1.02)
D DIMER PPP FEU-MCNC: 0.63 MG/L FEU (ref 0–0.65)
D DIMER PPP FEU-MCNC: 1.13 MG/L FEU (ref 0–0.65)
DIAGNOSIS, 93000: NORMAL
DIFFERENTIAL METHOD BLD: ABNORMAL
DIFFERENTIAL METHOD BLD: ABNORMAL
EOSINOPHIL # BLD: 0 K/UL (ref 0–0.4)
EOSINOPHIL # BLD: 0 K/UL (ref 0–0.4)
EOSINOPHIL NFR BLD: 0 % (ref 0–7)
EOSINOPHIL NFR BLD: 0 % (ref 0–7)
ERYTHROCYTE [DISTWIDTH] IN BLOOD BY AUTOMATED COUNT: 13 % (ref 11.5–14.5)
ERYTHROCYTE [DISTWIDTH] IN BLOOD BY AUTOMATED COUNT: 13.1 % (ref 11.5–14.5)
FIBRINOGEN PPP-MCNC: 621 MG/DL (ref 200–475)
GLOBULIN SER CALC-MCNC: 5.1 G/DL (ref 2–4)
GLOBULIN SER CALC-MCNC: 5.6 G/DL (ref 2–4)
GLUCOSE BLD STRIP.AUTO-MCNC: 126 MG/DL (ref 65–100)
GLUCOSE BLD STRIP.AUTO-MCNC: 174 MG/DL (ref 65–100)
GLUCOSE BLD STRIP.AUTO-MCNC: 190 MG/DL (ref 65–100)
GLUCOSE BLD STRIP.AUTO-MCNC: 220 MG/DL (ref 65–100)
GLUCOSE SERPL-MCNC: 241 MG/DL (ref 65–100)
GLUCOSE SERPL-MCNC: 246 MG/DL (ref 65–100)
HCT VFR BLD AUTO: 44.8 % (ref 35–47)
HCT VFR BLD AUTO: 47.7 % (ref 35–47)
HGB BLD-MCNC: 13.2 G/DL (ref 11.5–16)
HGB BLD-MCNC: 13.9 G/DL (ref 11.5–16)
IMM GRANULOCYTES # BLD AUTO: 0 K/UL (ref 0–0.04)
IMM GRANULOCYTES # BLD AUTO: 0.1 K/UL (ref 0–0.04)
IMM GRANULOCYTES NFR BLD AUTO: 0 % (ref 0–0.5)
IMM GRANULOCYTES NFR BLD AUTO: 1 % (ref 0–0.5)
INR PPP: 1 (ref 0.9–1.1)
LYMPHOCYTES # BLD: 0.4 K/UL (ref 0.8–3.5)
LYMPHOCYTES # BLD: 0.5 K/UL (ref 0.8–3.5)
LYMPHOCYTES NFR BLD: 5 % (ref 12–49)
LYMPHOCYTES NFR BLD: 5 % (ref 12–49)
MCH RBC QN AUTO: 27.7 PG (ref 26–34)
MCH RBC QN AUTO: 27.7 PG (ref 26–34)
MCHC RBC AUTO-ENTMCNC: 29.1 G/DL (ref 30–36.5)
MCHC RBC AUTO-ENTMCNC: 29.5 G/DL (ref 30–36.5)
MCV RBC AUTO: 93.9 FL (ref 80–99)
MCV RBC AUTO: 95 FL (ref 80–99)
MONOCYTES # BLD: 0.2 K/UL (ref 0–1)
MONOCYTES # BLD: 0.2 K/UL (ref 0–1)
MONOCYTES NFR BLD: 2 % (ref 5–13)
MONOCYTES NFR BLD: 2 % (ref 5–13)
NEUTS SEG # BLD: 6.9 K/UL (ref 1.8–8)
NEUTS SEG # BLD: 9.2 K/UL (ref 1.8–8)
NEUTS SEG NFR BLD: 92 % (ref 32–75)
NEUTS SEG NFR BLD: 93 % (ref 32–75)
NRBC # BLD: 0 K/UL (ref 0–0.01)
NRBC # BLD: 0 K/UL (ref 0–0.01)
NRBC BLD-RTO: 0 PER 100 WBC
NRBC BLD-RTO: 0 PER 100 WBC
P-R INTERVAL, ECG05: 186 MS
PLATELET # BLD AUTO: 94 K/UL (ref 150–400)
PLATELET # BLD AUTO: 95 K/UL (ref 150–400)
PMV BLD AUTO: 11.5 FL (ref 8.9–12.9)
PMV BLD AUTO: 11.6 FL (ref 8.9–12.9)
POTASSIUM SERPL-SCNC: 4.8 MMOL/L (ref 3.5–5.1)
POTASSIUM SERPL-SCNC: 4.9 MMOL/L (ref 3.5–5.1)
PROCALCITONIN SERPL-MCNC: 0.13 NG/ML
PROT SERPL-MCNC: 8.4 G/DL (ref 6.4–8.2)
PROT SERPL-MCNC: 9 G/DL (ref 6.4–8.2)
PROTHROMBIN TIME: 10.5 SEC (ref 9–11.1)
Q-T INTERVAL, ECG07: 312 MS
QRS DURATION, ECG06: 72 MS
QTC CALCULATION (BEZET), ECG08: 425 MS
RBC # BLD AUTO: 4.77 M/UL (ref 3.8–5.2)
RBC # BLD AUTO: 5.02 M/UL (ref 3.8–5.2)
RBC MORPH BLD: ABNORMAL
SERVICE CMNT-IMP: ABNORMAL
SODIUM SERPL-SCNC: 134 MMOL/L (ref 136–145)
SODIUM SERPL-SCNC: 137 MMOL/L (ref 136–145)
TROPONIN I SERPL-MCNC: <0.05 NG/ML
VENTRICULAR RATE, ECG03: 112 BPM
WBC # BLD AUTO: 7.6 K/UL (ref 3.6–11)
WBC # BLD AUTO: 9.9 K/UL (ref 3.6–11)

## 2021-02-19 PROCEDURE — XW033E5 INTRODUCTION OF REMDESIVIR ANTI-INFECTIVE INTO PERIPHERAL VEIN, PERCUTANEOUS APPROACH, NEW TECHNOLOGY GROUP 5: ICD-10-PCS | Performed by: GENERAL ACUTE CARE HOSPITAL

## 2021-02-19 PROCEDURE — 94640 AIRWAY INHALATION TREATMENT: CPT

## 2021-02-19 PROCEDURE — 94660 CPAP INITIATION&MGMT: CPT

## 2021-02-19 PROCEDURE — 36415 COLL VENOUS BLD VENIPUNCTURE: CPT

## 2021-02-19 PROCEDURE — 85610 PROTHROMBIN TIME: CPT

## 2021-02-19 PROCEDURE — 84484 ASSAY OF TROPONIN QUANT: CPT

## 2021-02-19 PROCEDURE — 74011000258 HC RX REV CODE- 258: Performed by: INTERNAL MEDICINE

## 2021-02-19 PROCEDURE — 77010033711 HC HIGH FLOW OXYGEN

## 2021-02-19 PROCEDURE — 82962 GLUCOSE BLOOD TEST: CPT

## 2021-02-19 PROCEDURE — 85384 FIBRINOGEN ACTIVITY: CPT

## 2021-02-19 PROCEDURE — 74011250636 HC RX REV CODE- 250/636: Performed by: INTERNAL MEDICINE

## 2021-02-19 PROCEDURE — 74011250637 HC RX REV CODE- 250/637: Performed by: NURSE PRACTITIONER

## 2021-02-19 PROCEDURE — 74011636637 HC RX REV CODE- 636/637: Performed by: INTERNAL MEDICINE

## 2021-02-19 PROCEDURE — 74011250637 HC RX REV CODE- 250/637: Performed by: INTERNAL MEDICINE

## 2021-02-19 PROCEDURE — 85025 COMPLETE CBC W/AUTO DIFF WBC: CPT

## 2021-02-19 PROCEDURE — 74011000250 HC RX REV CODE- 250: Performed by: INTERNAL MEDICINE

## 2021-02-19 PROCEDURE — 65660000000 HC RM CCU STEPDOWN

## 2021-02-19 PROCEDURE — 80053 COMPREHEN METABOLIC PANEL: CPT

## 2021-02-19 PROCEDURE — 85379 FIBRIN DEGRADATION QUANT: CPT

## 2021-02-19 PROCEDURE — 84145 PROCALCITONIN (PCT): CPT

## 2021-02-19 RX ORDER — AMMONIUM LACTATE 12 G/100G
LOTION TOPICAL 2 TIMES DAILY
Status: DISCONTINUED | OUTPATIENT
Start: 2021-02-19 | End: 2021-02-24 | Stop reason: HOSPADM

## 2021-02-19 RX ORDER — ENOXAPARIN SODIUM 100 MG/ML
40 INJECTION SUBCUTANEOUS EVERY 12 HOURS
Status: DISCONTINUED | OUTPATIENT
Start: 2021-02-19 | End: 2021-02-24 | Stop reason: HOSPADM

## 2021-02-19 RX ADMIN — CYCLOBENZAPRINE 5 MG: 10 TABLET, FILM COATED ORAL at 17:23

## 2021-02-19 RX ADMIN — FUROSEMIDE 40 MG: 40 TABLET ORAL at 17:23

## 2021-02-19 RX ADMIN — INSULIN LISPRO 2 UNITS: 100 INJECTION, SOLUTION INTRAVENOUS; SUBCUTANEOUS at 08:16

## 2021-02-19 RX ADMIN — CYCLOBENZAPRINE 5 MG: 10 TABLET, FILM COATED ORAL at 00:16

## 2021-02-19 RX ADMIN — DEXAMETHASONE SODIUM PHOSPHATE 6 MG: 4 INJECTION, SOLUTION INTRA-ARTICULAR; INTRALESIONAL; INTRAMUSCULAR; INTRAVENOUS; SOFT TISSUE at 21:38

## 2021-02-19 RX ADMIN — INSULIN GLARGINE 35 UNITS: 100 INJECTION, SOLUTION SUBCUTANEOUS at 08:16

## 2021-02-19 RX ADMIN — ALBUTEROL SULFATE 2 PUFF: 90 AEROSOL, METERED RESPIRATORY (INHALATION) at 07:54

## 2021-02-19 RX ADMIN — ALBUTEROL SULFATE 2 PUFF: 90 AEROSOL, METERED RESPIRATORY (INHALATION) at 14:59

## 2021-02-19 RX ADMIN — METOPROLOL TARTRATE 25 MG: 25 TABLET, FILM COATED ORAL at 08:18

## 2021-02-19 RX ADMIN — AZITHROMYCIN MONOHYDRATE 500 MG: 500 INJECTION, POWDER, LYOPHILIZED, FOR SOLUTION INTRAVENOUS at 10:19

## 2021-02-19 RX ADMIN — METOPROLOL TARTRATE 25 MG: 25 TABLET, FILM COATED ORAL at 17:23

## 2021-02-19 RX ADMIN — ALBUTEROL SULFATE 2 PUFF: 90 AEROSOL, METERED RESPIRATORY (INHALATION) at 03:10

## 2021-02-19 RX ADMIN — ENOXAPARIN SODIUM 40 MG: 40 INJECTION SUBCUTANEOUS at 21:37

## 2021-02-19 RX ADMIN — INSULIN LISPRO 3 UNITS: 100 INJECTION, SOLUTION INTRAVENOUS; SUBCUTANEOUS at 12:56

## 2021-02-19 RX ADMIN — ACETAMINOPHEN 650 MG: 325 TABLET ORAL at 17:23

## 2021-02-19 RX ADMIN — CYCLOBENZAPRINE 5 MG: 10 TABLET, FILM COATED ORAL at 08:15

## 2021-02-19 RX ADMIN — ATORVASTATIN CALCIUM 10 MG: 10 TABLET, FILM COATED ORAL at 08:15

## 2021-02-19 RX ADMIN — PANTOPRAZOLE SODIUM 40 MG: 40 TABLET, DELAYED RELEASE ORAL at 08:15

## 2021-02-19 RX ADMIN — FUROSEMIDE 40 MG: 40 TABLET ORAL at 08:15

## 2021-02-19 RX ADMIN — REMDESIVIR 200 MG: 100 INJECTION, POWDER, LYOPHILIZED, FOR SOLUTION INTRAVENOUS at 11:36

## 2021-02-19 RX ADMIN — ASPIRIN 81 MG: 81 TABLET, CHEWABLE ORAL at 08:15

## 2021-02-19 RX ADMIN — FLUTICASONE PROPIONATE 2 SPRAY: 50 SPRAY, METERED NASAL at 08:16

## 2021-02-19 RX ADMIN — SPIRONOLACTONE 25 MG: 25 TABLET ORAL at 08:16

## 2021-02-19 RX ADMIN — ENOXAPARIN SODIUM 40 MG: 40 INJECTION SUBCUTANEOUS at 10:19

## 2021-02-19 RX ADMIN — ALBUTEROL SULFATE 2 PUFF: 90 AEROSOL, METERED RESPIRATORY (INHALATION) at 20:04

## 2021-02-19 NOTE — ED NOTES
TRANSFER - OUT REPORT:    Verbal report given to Hudson Jauregui RN (name) on Connie Cheadle  being transferred to [facility], Emergency Department (unit) for routine progression of care       Report consisted of patients Situation, Background, Assessment and   Recommendations(SBAR). Information from the following report(s) SBAR, Kardex, ED Summary, Intake/Output, MAR and Recent Results, BiPap, VS, and Pain was reviewed with the receiving nurse. Lines:   Peripheral IV 02/18/21 Right Antecubital (Active)   Site Assessment Clean, dry, & intact 02/18/21 1659   Phlebitis Assessment 0 02/18/21 1659   Infiltration Assessment 0 02/18/21 1659   Dressing Status Clean, dry, & intact 02/18/21 1659   Dressing Type Tape 02/18/21 1659   Hub Color/Line Status Pink;Flushed;Patent 02/18/21 1659   Action Taken Blood drawn 02/18/21 1659   Alcohol Cap Used No 02/18/21 1659        Opportunity for questions and clarification was provided.       Patient transported with:  YASMANY Martinez  Cardiac Monitoring

## 2021-02-19 NOTE — ED PROVIDER NOTES
EMERGENCY DEPARTMENT HISTORY AND PHYSICAL EXAM     ------------------------------------------------------------------------------------------------------  Please note that this dictation was completed with [employer], the [employer] voice recognition software. Quite often unanticipated grammatical, syntax, homophones, and other interpretive errors are inadvertently transcribed by the computer software. Please disregard these errors. Please excuse any errors that have escaped final proofreading.  -----------------------------------------------------------------------------------------------------------------    Date: 2/18/2021  Patient Name: Ava Quintero    History of Presenting Illness     Chief Complaint   Patient presents with    Shortness of Breath     Patient arrives from [facility] via EMS. Patient currently on Lamond Dash +       History Provided By: Patient    HPI: Ava Quintero is a 52 y.o. female, with significant pmhx of COPD, CHF, chronic bilateral lymphedema, who presents via EMS as a transfer from [address] [place] [address] to the ED with c/o SOB. Patient notably acidotic, hypoxic and hypercapnic on arrival to [facility].. Was transitioned to BiPAP with improvement. Patient reportedly Covid positive as reported by rapid test at [facility]. Patient arrives by [facility] ambulance on BiPAP and satting at 94%. Notably tachycardic and mildly tachypneic. Sitting on side of stretcher as she typically does. Patient received dexamethasone prior to transport. PCP: Kaylyn Schlatter, MD    Social Hx: denies tobacco, denies EtOH, denies Illicit Drugs     There are no other complaints, changes, or physical findings at this time.      No Known Allergies      Current Facility-Administered Medications   Medication Dose Route Frequency Provider Last Rate Last Admin    acetaminophen (TYLENOL) tablet 650 mg  650 mg Oral Q6H PRN Kate Recinos MD        ondansetron [facility]) injection 4 mg  4 mg IntraVENous Q4H PRN Brien Bruce MD         Current Outpatient Medications   Medication Sig Dispense Refill    spironolactone (ALDACTONE) 25 mg tablet Take 1 Tab by mouth daily. 30 Tab 0    cyclobenzaprine (FLEXERIL) 5 mg tablet Take 5 mg by mouth two (2) times a day.  fluticasone-umeclidinium-vilanterol (Trelegy Ellipta) 100-62.5-25 mcg inhaler Take 1 Puff by inhalation daily.  NovoLOG Flexpen U-100 Insulin 100 unit/mL (3 mL) inpn 5-10 Units by SubCUTAneous route as directed. Sliding scale: -300: 5 units -400 units: 10 units      ammonium lactate (LAC-HYDRIN) 12 % lotion Apply 1 Applicator to affected area as needed. rub in to affected area well      omeprazole (PRILOSEC) 40 mg capsule Take 40 mg by mouth daily.  nystatin (MYCOSTATIN) topical cream Apply  to affected area two (2) times a day. 15 g 0    albuterol-ipratropium (DUO-NEB) 2.5 mg-0.5 mg/3 ml nebu 3 mL by Nebulization route four (4) times daily. 100 Nebule 1    furosemide (LASIX) 40 mg tablet Take 40 mg by mouth two (2) times a day.  fluticasone (FLONASE) 50 mcg/actuation nasal spray 2 Sprays by Both Nostrils route daily. 1 Bottle 0    insulin glargine (LANTUS) 100 unit/mL injection 35 Units by SubCUTAneous route daily.  metoprolol tartrate (LOPRESSOR) 25 mg tablet Take 25 mg by mouth two (2) times a day.  loratadine (CLARITIN) 10 mg tablet Take 10 mg by mouth daily.  albuterol (VENTOLIN HFA) 90 mcg/actuation inhaler Take 2 Puffs by inhalation every six (6) hours as needed for Wheezing.  aspirin 81 mg chewable tablet Take 81 mg by mouth daily.  lovastatin (MEVACOR) 40 mg tablet Take 1 Tab by mouth nightly. 30 Tab 6    glyBURIDE (DIABETA) 5 mg tablet Take 5 mg by mouth two (2) times daily (with meals).  nitroglycerin (NITROSTAT) 0.4 mg SL tablet 1 Tab by SubLINGual route every five (5) minutes as needed for Chest Pain (call 911 if not relieved by 3).  25 Tab 2       Past History Past Medical History:  Past Medical History:   Diagnosis Date    Arthritis     Asthma     Breast lump     CAD (coronary artery disease)     Cancer (HCC)     breast cancer    Congestive heart failure (HCC)     COPD (chronic obstructive pulmonary disease) (HCC)     Diabetes ([facility]     Hypertension     Morbid obesity with BMI of 70 and over, adult ([facility]     NSTEMI (non-ST elevated myocardial infarction) ([facility]     NSTEMI (non-ST elevated myocardial infarction) ([facility] 2012    SVT (supraventricular tachycardia) ([facility])        Past Surgical History:  Past Surgical History:   Procedure Laterality Date    HX  SECTION      HX ORTHOPAEDIC      HX OTHER SURGICAL      cyst removed from back    [place] CARDIAC SURG PROCEDURE UNLIST      Stents       Family History:  Family History   Problem Relation Age of Onset    Heart Disease Mother     Heart Disease Father     Heart Disease Sister     Breast Cancer Maternal Grandmother     Breast Cancer Paternal Grandmother        Social History:  Social History     Tobacco Use    Smoking status: Former Smoker     Packs/day: 0.25     Types: Cigarettes    Smokeless tobacco: Never Used    Tobacco comment: Patient states \"I  aint smoking no more d*mn cigarettes after yesterday\" 2018   Substance Use Topics    Alcohol use: No    Drug use: No       Allergies:  No Known Allergies      Review of Systems   Review of Systems   Constitutional: Negative. Negative for fever. Eyes: Negative. Respiratory: Positive for shortness of breath. Cardiovascular: Negative for chest pain. Gastrointestinal: Negative for abdominal pain, nausea and vomiting. Endocrine: Negative. Genitourinary: Negative. Negative for difficulty urinating, dysuria and hematuria. Musculoskeletal: Negative. Skin: Negative. Neurological: Positive for dizziness. Psychiatric/Behavioral: Negative for suicidal ideas. All other systems reviewed and are negative.       Physical Exam   Physical Exam  Vitals signs and nursing note reviewed. Constitutional:       Appearance: She is obese. She is ill-appearing (chronically). HENT:      Head: Normocephalic and atraumatic. Nose: No nasal deformity. Mouth/Throat:      Lips: No lesions. Eyes:      Conjunctiva/sclera: Conjunctivae normal.   Neck:      Musculoskeletal: Normal range of motion. No pain with movement. Cardiovascular:      Rate and Rhythm: Normal rate. Pulmonary:      Effort: Tachypnea and accessory muscle usage present. Musculoskeletal: Normal range of motion. Right lower leg: Edema (Chronic lymphedema) present. Left lower leg: Edema (Chronic lymphedema) present. Skin:     General: Skin is dry. Findings: No rash. Neurological:      General: No focal deficit present. Mental Status: She is alert.            Diagnostic Study Results     Labs -     Recent Results (from the past 12 hour(s))   BLOOD GAS + IONIZED CALCIUM    Collection Time: 02/18/21  4:46 PM   Result Value Ref Range    pH 7.26 (L) 7.35 - 7.45      PCO2 79 (H) 35 - 45 mmHg    PO2 101 (H) 80 - 100 mmHg    BICARBONATE 35 (H) 22 - 26 mmol/L    BASE EXCESS 4.5 mmol/L    O2 SAT 98 (H) 92 - 97 %    Calcium, ionized 1.47 (H) 1.13 - 1.32 mmol/L    O2 METHOD NASAL CANNULA      O2 FLOW RATE 5.00 L/min    Sample source ARTERIAL      SITE RIGHT RADIAL      PARUL'S TEST YES     METABOLIC PANEL, COMPREHENSIVE    Collection Time: 02/18/21  4:53 PM   Result Value Ref Range    Sodium 140 136 - 145 mmol/L    Potassium 4.4 3.5 - 5.1 mmol/L    Chloride 102 97 - 108 mmol/L    CO2 35 (H) 21 - 32 mmol/L    Anion gap 3 (L) 5 - 15 mmol/L    Glucose 162 (H) 65 - 100 mg/dL    BUN 21 (H) 6 - 20 MG/DL    Creatinine 0.98 0.55 - 1.02 MG/DL    BUN/Creatinine ratio 21 (H) 12 - 20      GFR est AA >60 >60 ml/min/1.73m2    GFR est non-AA >60 >60 ml/min/1.73m2    Calcium 10.8 (H) 8.5 - 10.1 MG/DL    Bilirubin, total 0.4 0.2 - 1.0 MG/DL    ALT (SGPT) 39 12 - 78 U/L AST (SGOT) 29 15 - 37 U/L    Alk. phosphatase 105 45 - 117 U/L    Protein, total 8.8 (H) 6.4 - 8.2 g/dL    Albumin 3.4 (L) 3.5 - 5.0 g/dL    Globulin 5.4 (H) 2.0 - 4.0 g/dL    A-G Ratio 0.6 (L) 1.1 - 2.2     CBC WITH AUTOMATED DIFF    Collection Time: 02/18/21  4:53 PM   Result Value Ref Range    WBC 8.8 3.6 - 11.0 K/uL    RBC 5.25 (H) 3.80 - 5.20 M/uL    HGB 14.8 11.5 - 16.0 g/dL    HCT 49.0 (H) 35.0 - 47.0 %    MCV 93.3 80.0 - 99.0 FL    MCH 28.2 26.0 - 34.0 PG    MCHC 30.2 30.0 - 36.5 g/dL    RDW 12.8 11.5 - 14.5 %    PLATELET 442 (L) 057 - 400 K/uL    MPV 11.1 8.9 - 12.9 FL    NRBC 0.0 0  WBC    ABSOLUTE NRBC 0.00 0.00 - 0.01 K/uL    NEUTROPHILS 75 32 - 75 %    BAND NEUTROPHILS 3 0 - 6 %    LYMPHOCYTES 7 (L) 12 - 49 %    MONOCYTES 14 (H) 5 - 13 %    EOSINOPHILS 1 0 - 7 %    BASOPHILS 0 0 - 1 %    IMMATURE GRANULOCYTES 0 %    ABS. NEUTROPHILS 6.9 1.8 - 8.0 K/UL    ABS. LYMPHOCYTES 0.6 (L) 0.8 - 3.5 K/UL    ABS. MONOCYTES 1.2 (H) 0.0 - 1.0 K/UL    ABS. EOSINOPHILS 0.1 0.0 - 0.4 K/UL    ABS. BASOPHILS 0.0 0.0 - 0.1 K/UL    ABS. IMM.  GRANS. 0.0 K/UL    DF MANUAL      RBC COMMENTS NORMOCYTIC, NORMOCHROMIC     NT-PRO BNP    Collection Time: 02/18/21  4:53 PM   Result Value Ref Range    NT pro- (H) 0 - 125 PG/ML   TROPONIN I    Collection Time: 02/18/21  4:53 PM   Result Value Ref Range    Troponin-I, Qt. <0.05 <0.05 ng/mL   SARS-COV-2    Collection Time: 02/18/21  6:26 PM   Result Value Ref Range    SARS-CoV-2 Please find results under separate order     COVID-19 RAPID TEST    Collection Time: 02/18/21  6:26 PM   Result Value Ref Range    Specimen source Nasopharyngeal      COVID-19 rapid test Detected (AA) NOTD     BLOOD GAS, ARTERIAL    Collection Time: 02/18/21  7:46 PM   Result Value Ref Range    pH 7.30 (L) 7.35 - 7.45      PCO2 68 (H) 35 - 45 mmHg    PO2 88 80 - 100 mmHg    O2 SAT 97 92 - 97 %    BICARBONATE 33 (H) 22 - 26 mmol/L    BASE EXCESS 3.9 mmol/L    O2 METHOD BIPAP      FIO2 40 %    MODE BIPAP      SPONTANEOUS RATE 22      IPAP/PIP 16      EPAP/CPAP/PEEP 6      Sample source ARTERIAL      SITE RR      PARUL'S TEST YES     EKG, 12 LEAD, INITIAL    Collection Time: 02/18/21  9:50 PM   Result Value Ref Range    Ventricular Rate 112 BPM    Atrial Rate 112 BPM    P-R Interval 186 ms    QRS Duration 72 ms    Q-T Interval 312 ms    QTC Calculation (Bezet) 425 ms    Calculated P Axis 72 degrees    Calculated R Axis 104 degrees    Calculated T Axis 72 degrees    Diagnosis       Sinus tachycardia  Rightward axis  When compared with ECG of 17-AUG-2020 00:38,  premature ventricular complexes are no longer present  premature atrial complexes are no longer present         Radiologic Studies -   XR CHEST PORT    (Results Pending)     CT Results  (Last 48 hours)    None        CXR Results  (Last 48 hours)               02/18/21 1649  XR CHEST PORT Final result    Impression:  1. Enlarged cardiac silhouette. Prominence of the right hilum is felt to be   technical. When the patient can tolerate recommend PA and lateral with good   inspiration otherwise no acute abnormality           Narrative:  INDICATION:  SOB hx of COPD and CHF        EXAM: Portable chest 1645 hours. Film is rotated. Comparison 8/17/2020       FINDINGS: Cardiac silhouette remains enlarged. Right hilum is prominent however   this is accentuated by positioning. No pulmonary vascular congestion. No   consolidation. No pneumothorax or effusion                   Medical Decision Making   I am the first provider for this patient. I reviewed the vital signs, available nursing notes, past medical history, past surgical history, family history and social history. Vital Signs-Reviewed the patient's vital signs.   Patient Vitals for the past 12 hrs:   Temp Pulse Resp BP SpO2   02/18/21 2105 98.6 °F (37 °C) (!) 104 14 108/72 95 %       Pulse Oximetry Analysis - 95% on 100% fio@ on bipap    Records Reviewed/Interpretted: Nursing Notes from triage and Old Medical Records, reviewing lab results from earlier today. Provider Notes (Medical Decision Making):     DDX:  COVID +, PNA, acute respiratory failure    Plan:  Cxr, admit to hospitalist    Impression:  Acute respiratory failure, COVID 19 infection    ED Course:   Initial assessment performed. The patients presenting problems have been discussed, and they are in agreement with the care plan formulated and outlined with them. I have encouraged them to ask questions as they arise throughout their visit. I reviewed our electronic medical record system for any past medical records that were available that may contribute to the patients current condition, the nursing notes and and vital signs from today's visit  Nursing notes will be reviewed as they become available in realtime while the pt has been in the ED. Reba Bautista MD    I personally reviewed/interpreted pt's imaging. Agree with official read by radiology as noted above. Reba Bautista MD      CONSULT NOTE:   9:40 PM  Reba Bautista MD spoke with Dr. Sherif Mcneill,   Specialty: Balta Mcneill due to acute respiratory failure. Discussed pt's HPI and available diagnostic results thus far. Expressed concerns for needed admission. Consultant will evaluate for admission. Requests repeat ABG to determine interval improvement. Reba Bautista MD    ADMISSION NOTE:  9:40 PM  Patient is being admitted to the hospital by Dr. Sherif Mcneill. The results of their tests and reasons for their admission have been discussed with them and/or available family. They convey agreement and understanding for the need to be admitted and for their admission diagnosis. Reba Bautista MD               Critical Care Time:     none      Diagnosis     Clinical Impression:   1. Acute respiratory failure due to COVID-19 [facility])        PLAN:  1.  Admit to hospitalist

## 2021-02-19 NOTE — ED NOTES
Pt transported via EMS to ED [facility], ED in stable condition. BiPap settings reviewed w/ RAA.

## 2021-02-19 NOTE — ED NOTES
TRANSFER - OUT REPORT:    Verbal report given to [address] [place] [address] Chris Smith RN (name) on Naresh Jorgensen  being transferred to PCU (unit) for routine progression of care       Report consisted of patients Situation, Background, Assessment and   Recommendations(SBAR). Information from the following report(s) SBAR, Kardex, ED Summary, Intake/Output, MAR, Recent Results, Med Rec Status and Cardiac Rhythm Sinus Tach was reviewed with the receiving nurse. Lines:   Peripheral IV 02/18/21 Right Antecubital (Active)        Opportunity for questions and clarification was provided.       Patient transported with:   Monitor  Registered Nurse   BiPaP  Respiratory therapist

## 2021-02-19 NOTE — PROGRESS NOTES
Hospitalist Progress Note    NAME: Tianna Lab   :  1970   MRN:  [id number]       Assessment / Plan:  Acute hypercarbic respiratory failure due to  COVID-19 pneumonia  COPD exacerbation  Obesity hypoventilation syndrome  Restrictive lung disease due to super morbid obesity  -Rapid SARS-CoV-2 is positive  -On arrival to ED at [facility], PCT was 79 and improving to 68.  pH is also improving.  -Was on BiPAP yesterday, currently on 13 L  -Appreciated pulmonology consultation  -Continue remdesivir  -Continue Decadron  -Continue azithromycin  -Continue Lasix  -Continue oxygen supplementation  -BiPAP as needed  -Patient looks comfortable today-  -Monitor inflammatory markers daily     Diabetes mellitus type 2  -Continue home Lantus at 35 units. Continue insulin sliding scale with blood sugar checks as well  -Hold home glyburide     Super morbid obesity  Hypertension  Dyslipidemia  Diastolic congestive heart failure not in exacerbation  Coronary artery disease  Obesity hypoventilation syndrome  History of breast cancer with L2 sclerotic lesion on CT scan in 2019.  -Continue Lasix, metoprolol, statin, aspirin, home inhalers  -Consider repeating a CT of spine given L2 sclerotic lesion since I do not see any follow-up study. Informed her to please follow-up with primary care physician given her history of breast cancer as well.        Code Status: Full code  Surrogate Decision Maker: Sanford Alvarez     DVT Prophylaxis: Lovenox        Baseline: From home       Subjective:     Chief Complaint / Reason for Physician Visit  \" Patient reports some improvement in shortness of breath, she is currently off the BiPAP, currently on 13 L oxygen. Reports dry cough, denies any wheezing or orthopnea\". Discussed with RN events overnight.      Review of Systems:  Symptom Y/N Comments  Symptom Y/N Comments   Fever/Chills n   Chest Pain n    Poor Appetite n   Edema y  chronic lymphedema   Cough y   Abdominal Pain     Sputum n Joint Pain     SOB/RAZO y   Pruritis/Rash     Nausea/vomit n   Tolerating PT/OT     Diarrhea n   Tolerating Diet     Constipation n   Other       Could NOT obtain due to:      Objective:     VITALS:   Last 24hrs VS reviewed since prior progress note. Most recent are:  Patient Vitals for the past 24 hrs:   Temp Pulse Resp BP SpO2   02/19/21 1138 97.8 °F (36.6 °C) 83 16 114/79 98 %   02/19/21 1047     96 %   02/19/21 0911     94 %   02/19/21 0812 97.4 °F (36.3 °C) 94 13 123/85 91 %   02/19/21 0754     96 %   02/19/21 0540     92 %   02/19/21 0310 97.5 °F (36.4 °C) (!) 107 20 118/73 90 %   02/19/21 0230 97.6 °F (36.4 °C) (!) 110 18 117/87 95 %   02/19/21 0200  (!) 110 19 91/62 94 %   02/19/21 0100  (!) 113 19 (!) 102/55 93 %   02/19/21 0000  (!) 104 18 113/76 90 %   02/18/21 2300  (!) 111 19 117/83 90 %   02/18/21 2245  (!) 110  112/77    02/18/21 2224     94 %   02/18/21 2130  (!) 110 17 127/84 93 %   02/18/21 2105 98.6 °F (37 °C) (!) 104 14 108/72 95 %       Intake/Output Summary (Last 24 hours) at 2/19/2021 1223  Last data filed at 2/19/2021 1136  Gross per 24 hour   Intake 900 ml   Output    Net 900 ml        PHYSICAL EXAM:  General: WD, WN. Alert, cooperative, no acute distress. Patient is morbidly obese  EENT:  EOMI. Anicteric sclerae. MMM  Resp:  Poor air entry in all the lung fields, no wheezing or rales. No accessory muscle use  CV:  Regular  rhythm, chronic lymphedema  GI:  Soft, Non distended, Non tender.  +Bowel sounds  Neurologic:  Alert and oriented X 3, normal speech,   Psych:   Good insight. Not anxious nor agitated  Skin:  No rashes.   No jaundice    Reviewed most current lab test results and cultures  YES  Reviewed most current radiology test results   YES  Review and summation of old records today    NO  Reviewed patient's current orders and MAR    YES  PMH/SH reviewed - no change compared to H&P  ________________________________________________________________________  Care Plan discussed with:    Comments   Patient x    Family      RN x    Care Manager     Consultant                       x Multidiciplinary team rounds were held today with , nursing, pharmacist and clinical coordinator. Patient's plan of care was discussed; medications were reviewed and discharge planning was addressed. ________________________________________________________________________  Total NON critical care TIME:  35   Minutes    Total CRITICAL CARE TIME Spent:   Minutes non procedure based      Comments   >50% of visit spent in counseling and coordination of care     ________________________________________________________________________  Jorge Adams MD     Procedures: see electronic medical records for all procedures/Xrays and details which were not copied into this note but were reviewed prior to creation of Plan. LABS:  I reviewed today's most current labs and imaging studies.   Pertinent labs include:  Recent Labs     02/19/21 0341 02/19/21  0021 02/18/21  1653   WBC 7.6 9.9 8.8   HGB 13.9 13.2 14.8   HCT 47.7* 44.8 49.0*   PLT 94* 95* 111*     Recent Labs     02/19/21 0341 02/19/21  0021 02/18/21  1653   * 137 140   K 4.9 4.8 4.4   CL 98 100 102   CO2 31 32 35*   * 246* 162*   BUN 29* 26* 21*   CREA 1.26* 1.12* 0.98   CA 10.9* 10.6* 10.8*   ALB 3.4* 3.3* 3.4*   TBILI 0.4 0.4 0.4   ALT 34 34 39   INR 1.0  --   --        Signed: Jorge Adams MD

## 2021-02-19 NOTE — WOUND CARE
Wound Care Nurse Consult: consult from staff nurse stating \" dry, scaly skin to BLE\". Patient is a 51 y/o AAF admitted for acute respiratory failure and COVID 19 positive. She is a morbidly obese non-compliant patient well known to wound care for BLE lymphedema issues. Currently no open wounds, just dry, scaly skin. Past Medical History:  
Diagnosis Date  Arthritis  Asthma  Breast lump  CAD (coronary artery disease)  Cancer ([facility] breast cancer  Congestive heart failure ([facility]  COPD (chronic obstructive pulmonary disease) ([facility])  Diabetes ([facility]  Hypertension  Morbid obesity with BMI of 70 and over, adult ([facility]  NSTEMI (non-ST elevated myocardial infarction) ([facility]  NSTEMI (non-ST elevated myocardial infarction) ([facility] 8/18/2012  SVT (supraventricular tachycardia) ([facility]) Recommend: 
 
Patient refuse a bariatric bed every admission Lac Hydrin lotion BID to clean skin of BLE to loosen and remove dry scaly skin.  
 
Sanjuanita Hankins RN, [employer]

## 2021-02-19 NOTE — PROGRESS NOTES
0700- Report given to Isreal Garza RN by off going nurse. 6260- Placed pt on 6 L for medications. SATS down to 83%. Call to RT for high flow setup.    0905- Pt placed on 15 L high flow. SATS 95%. 0035- Pulmonology consult called. 1498- Offered to order pt a bariatric bed but pt declined. 1900- Report given to oncoming nurse by Isreal Garza RN.

## 2021-02-19 NOTE — CONSULTS
Pulmonary, Critical Care, and Sleep Medicine    Initial Patient Consult    Name: Minnie Pat MRN: [id number]   : 1970 Hospital: [facility]   Date: 2021        IMPRESSION:   · Acute/chronic hypoxic/hypercapnic respiratory failure  · COVID 19 pneumonia  · GEN/OHS, decompensated - patient of Dr. Carlos Cisse of [facility] sleep  · COPD/asthma - no prior PFTs, does not follow with us, not clearly exacerbated  · DM  · CAD  · HTN  · Morbid obesity      RECOMMENDATIONS:   · Continue NIV for now with breaks as tolerated  · Dexamethasone x 10 days  · Remdesivir x 5 days  · Bronchodilators   · Empiric antibiotics   · Monitor inflammatory markers  · Insulin  · DVT prophylaxis  · Very high risk for further decompensation     Subjective: This patient has been seen and evaluated at the request of Dr. Foster Lyon for respiratory failure. Patient is a 52 y.o. female with GEN/OHS with frequent admissions, presented with >1 week history of increased dyspnea with associated cough. She was found to be severely hypoxic and COVID testing was positive. She is now on BiPAP with improved SpO2. She reports a history of COPD, though she has never been formally tested for that.        Past Medical History:   Diagnosis Date    Arthritis     Asthma     Breast lump     CAD (coronary artery disease)     Cancer (HCC)     breast cancer    Congestive heart failure (HCC)     COPD (chronic obstructive pulmonary disease) (HCC)     Diabetes ([address]     Hypertension     Morbid obesity with BMI of 70 and over, adult [facility]     NSTEMI (non-ST elevated myocardial infarction) [facility]     NSTEMI (non-ST elevated myocardial infarction) ([facility] 2012    SVT (supraventricular tachycardia) ([facility])       Past Surgical History:   Procedure Laterality Date    HX  SECTION      HX ORTHOPAEDIC      HX OTHER SURGICAL      cyst removed from back    DC CARDIAC SURG PROCEDURE UNLIST      Stents      Prior to Admission medications    Medication Sig Start Date End Date Taking? Authorizing Provider   spironolactone (ALDACTONE) 25 mg tablet Take 1 Tab by mouth daily. 8/21/20   Tabitha Patiño MD   cyclobenzaprine (FLEXERIL) 5 mg tablet Take 5 mg by mouth two (2) times a day. Provider, Historical   fluticasone-umeclidinium-vilanterol (Trelegy Ellipta) 100-62.5-25 mcg inhaler Take 1 Puff by inhalation daily. Provider, Historical   NovoLOG Flexpen U-100 Insulin 100 unit/mL (3 mL) inpn 5-10 Units by SubCUTAneous route as directed. Sliding scale: -300: 5 units -400 units: 10 units 6/11/20   Provider, Historical   ammonium lactate (LAC-HYDRIN) 12 % lotion Apply 1 Applicator to affected area as needed. rub in to affected area well    Provider, Historical   omeprazole (PRILOSEC) 40 mg capsule Take 40 mg by mouth daily. Provider, Historical   nystatin (MYCOSTATIN) topical cream Apply  to affected area two (2) times a day. 9/16/19   Jose David FERREIRA MD   albuterol-ipratropium (DUO-NEB) 2.5 mg-0.5 mg/3 ml nebu 3 mL by Nebulization route four (4) times daily. 3/3/19   Lilly Whitt MD   furosemide (LASIX) 40 mg tablet Take 40 mg by mouth two (2) times a day. Other, MD Landon   fluticasone (FLONASE) 50 mcg/actuation nasal spray 2 Sprays by Both Nostrils route daily. 1/6/19   Antonella Choi MD   insulin glargine (LANTUS) 100 unit/mL injection 35 Units by SubCUTAneous route daily. Provider, Historical   metoprolol tartrate (LOPRESSOR) 25 mg tablet Take 25 mg by mouth two (2) times a day. Provider, Historical   loratadine (CLARITIN) 10 mg tablet Take 10 mg by mouth daily. Provider, Historical   albuterol (VENTOLIN HFA) 90 mcg/actuation inhaler Take 2 Puffs by inhalation every six (6) hours as needed for Wheezing. Provider, Historical   aspirin 81 mg chewable tablet Take 81 mg by mouth daily. Provider, Historical   lovastatin (MEVACOR) 40 mg tablet Take 1 Tab by mouth nightly.  1/14/16   Duke Charles, NP   glyBURIDE (DIABETA) 5 mg tablet Take 5 mg by mouth two (2) times daily (with meals). Provider, Historical   nitroglycerin (NITROSTAT) 0.4 mg SL tablet 1 Tab by SubLINGual route every five (5) minutes as needed for Chest Pain (call 911 if not relieved by 3). 9/12/13   Duke Charles NP     No Known Allergies   Social History     Tobacco Use    Smoking status: Former Smoker     Packs/day: 0.25     Types: Cigarettes    Smokeless tobacco: Never Used    Tobacco comment: Patient states \"I  aint smoking no more d*mn cigarettes after yesterday\" 01/26/2018   Substance Use Topics    Alcohol use: No      Family History   Problem Relation Age of Onset    Heart Disease Mother     Heart Disease Father     Heart Disease Sister     Breast Cancer Maternal Grandmother     Breast Cancer Paternal Grandmother         Current Facility-Administered Medications   Medication Dose Route Frequency    enoxaparin (LOVENOX) injection 40 mg  40 mg SubCUTAneous Q12H    albuterol (PROVENTIL HFA, VENTOLIN HFA, PROAIR HFA) inhaler 2 Puff  2 Puff Inhalation Q6H RT    aspirin chewable tablet 81 mg  81 mg Oral DAILY    fluticasone propionate (FLONASE) 50 mcg/actuation nasal spray 2 Spray  2 Spray Both Nostrils DAILY    furosemide (LASIX) tablet 40 mg  40 mg Oral BID    insulin glargine (LANTUS) injection 35 Units  35 Units SubCUTAneous DAILY    atorvastatin (LIPITOR) tablet 10 mg  10 mg Oral DAILY    metoprolol tartrate (LOPRESSOR) tablet 25 mg  25 mg Oral BID    pantoprazole (PROTONIX) tablet 40 mg  40 mg Oral ACB    spironolactone (ALDACTONE) tablet 25 mg  25 mg Oral DAILY    dexamethasone (DECADRON) 4 mg/mL injection 6 mg  6 mg IntraVENous Q24H    insulin lispro (HUMALOG) injection   SubCUTAneous AC&HS    cyclobenzaprine (FLEXERIL) tablet 5 mg  5 mg Oral BID       Review of Systems:  A comprehensive review of systems was negative except for that written in the HPI.     Objective:   Vital Signs:    Visit Vitals  /85 (BP 1 Location: Left lower arm, BP Patient Position: At rest)   Pulse 94   Temp 97.4 °F (36.3 °C)   Resp 13   Ht 5' 7\" (1.702 m)   Wt (!) 194.6 kg (429 lb)   SpO2 94%   BMI 67.19 kg/m²       O2 Device: Hi flow nasal cannula   O2 Flow Rate (L/min): 15 l/min   Temp (24hrs), Av °F (36.7 °C), Min:97.4 °F (36.3 °C), Max:98.8 °F (37.1 °C)       Intake/Output:   Last shift:      No intake/output data recorded. Last 3 shifts: No intake/output data recorded. No intake or output data in the 24 hours ending 21 0934   Physical Exam:   General:  Morbidly obese, sleeping, on BiPAP   Head:  Normocephalic, without obvious abnormality, atraumatic. Eyes:  Conjunctivae/corneas clear. PERRL, EOMs intact. Nose: Nares normal. Septum midline. Mucosa normal.     Throat: Lips, mucosa, and tongue normal.    Neck: Supple, symmetrical, trachea midline    Back:   Symmetric, no curvature. ROM normal.   Lungs:   Distant bilateral breath sounds   Chest wall:  No tenderness or deformity. Heart:  Regular rate and rhythm    Abdomen:   Soft, non-tender. Bowel sounds normal.     Extremities: Extremities normal, atraumatic, no cyanosis or clubbing   Skin: Skin color, texture, turgor normal. Significant hirsutism.     Lymph nodes: Cervical, supraclavicular nodes normal.   Neurologic: Grossly nonfocal     Data review:     Recent Results (from the past 24 hour(s))   BLOOD GAS + IONIZED CALCIUM    Collection Time: 21  4:46 PM   Result Value Ref Range    pH 7.26 (L) 7.35 - 7.45      PCO2 79 (H) 35 - 45 mmHg    PO2 101 (H) 80 - 100 mmHg    BICARBONATE 35 (H) 22 - 26 mmol/L    BASE EXCESS 4.5 mmol/L    O2 SAT 98 (H) 92 - 97 %    Calcium, ionized 1.47 (H) 1.13 - 1.32 mmol/L    O2 METHOD NASAL CANNULA      O2 FLOW RATE 5.00 L/min    Sample source ARTERIAL      SITE RIGHT RADIAL      PARUL'S TEST YES     METABOLIC PANEL, COMPREHENSIVE    Collection Time: 21  4:53 PM   Result Value Ref Range    Sodium 140 136 - 145 mmol/L    Potassium 4.4 3.5 - 5.1 mmol/L    Chloride 102 97 - 108 mmol/L    CO2 35 (H) 21 - 32 mmol/L    Anion gap 3 (L) 5 - 15 mmol/L    Glucose 162 (H) 65 - 100 mg/dL    BUN 21 (H) 6 - 20 MG/DL    Creatinine 0.98 0.55 - 1.02 MG/DL    BUN/Creatinine ratio 21 (H) 12 - 20      GFR est AA >60 >60 ml/min/1.73m2    GFR est non-AA >60 >60 ml/min/1.73m2    Calcium 10.8 (H) 8.5 - 10.1 MG/DL    Bilirubin, total 0.4 0.2 - 1.0 MG/DL    ALT (SGPT) 39 12 - 78 U/L    AST (SGOT) 29 15 - 37 U/L    Alk. phosphatase 105 45 - 117 U/L    Protein, total 8.8 (H) 6.4 - 8.2 g/dL    Albumin 3.4 (L) 3.5 - 5.0 g/dL    Globulin 5.4 (H) 2.0 - 4.0 g/dL    A-G Ratio 0.6 (L) 1.1 - 2.2     CBC WITH AUTOMATED DIFF    Collection Time: 02/18/21  4:53 PM   Result Value Ref Range    WBC 8.8 3.6 - 11.0 K/uL    RBC 5.25 (H) 3.80 - 5.20 M/uL    HGB 14.8 11.5 - 16.0 g/dL    HCT 49.0 (H) 35.0 - 47.0 %    MCV 93.3 80.0 - 99.0 FL    MCH 28.2 26.0 - 34.0 PG    MCHC 30.2 30.0 - 36.5 g/dL    RDW 12.8 11.5 - 14.5 %    PLATELET 236 (L) 206 - 400 K/uL    MPV 11.1 8.9 - 12.9 FL    NRBC 0.0 0  WBC    ABSOLUTE NRBC 0.00 0.00 - 0.01 K/uL    NEUTROPHILS 75 32 - 75 %    BAND NEUTROPHILS 3 0 - 6 %    LYMPHOCYTES 7 (L) 12 - 49 %    MONOCYTES 14 (H) 5 - 13 %    EOSINOPHILS 1 0 - 7 %    BASOPHILS 0 0 - 1 %    IMMATURE GRANULOCYTES 0 %    ABS. NEUTROPHILS 6.9 1.8 - 8.0 K/UL    ABS. LYMPHOCYTES 0.6 (L) 0.8 - 3.5 K/UL    ABS. MONOCYTES 1.2 (H) 0.0 - 1.0 K/UL    ABS. EOSINOPHILS 0.1 0.0 - 0.4 K/UL    ABS. BASOPHILS 0.0 0.0 - 0.1 K/UL    ABS. IMM.  GRANS. 0.0 K/UL    DF MANUAL      RBC COMMENTS NORMOCYTIC, NORMOCHROMIC     NT-PRO BNP    Collection Time: 02/18/21  4:53 PM   Result Value Ref Range    NT pro- (H) 0 - 125 PG/ML   TROPONIN I    Collection Time: 02/18/21  4:53 PM   Result Value Ref Range    Troponin-I, Qt. <0.05 <0.05 ng/mL   SARS-COV-2    Collection Time: 02/18/21  6:26 PM   Result Value Ref Range    SARS-CoV-2 Please find results under separate order     COVID-19 RAPID TEST Collection Time: 02/18/21  6:26 PM   Result Value Ref Range    Specimen source Nasopharyngeal      COVID-19 rapid test Detected (AA) NOTD     BLOOD GAS, ARTERIAL    Collection Time: 02/18/21  7:46 PM   Result Value Ref Range    pH 7.30 (L) 7.35 - 7.45      PCO2 68 (H) 35 - 45 mmHg    PO2 88 80 - 100 mmHg    O2 SAT 97 92 - 97 %    BICARBONATE 33 (H) 22 - 26 mmol/L    BASE EXCESS 3.9 mmol/L    O2 METHOD BIPAP      FIO2 40 %    MODE BIPAP      SPONTANEOUS RATE 22      IPAP/PIP 16      EPAP/CPAP/PEEP 6      Sample source ARTERIAL      SITE RR      PARUL'S TEST YES     EKG, 12 LEAD, INITIAL    Collection Time: 02/18/21  9:50 PM   Result Value Ref Range    Ventricular Rate 112 BPM    Atrial Rate 112 BPM    P-R Interval 186 ms    QRS Duration 72 ms    Q-T Interval 312 ms    QTC Calculation (Bezet) 425 ms    Calculated P Axis 72 degrees    Calculated R Axis 104 degrees    Calculated T Axis 72 degrees    Diagnosis       Sinus tachycardia  When compared with ECG of 17-AUG-2020 00:38,  premature ventricular complexes are no longer present  premature atrial complexes are no longer present  Confirmed by Jose Marino ([id number]) on 2/19/2021 8:42:08 AM     BLOOD GAS, ARTERIAL    Collection Time: 02/18/21 10:29 PM   Result Value Ref Range    pH 7.29 (L) 7.35 - 7.45      PCO2 66 (H) 35 - 45 mmHg    PO2 78 (L) 80 - 100 mmHg    O2 SAT 94 92 - 97 %    BICARBONATE 31 (H) 22 - 26 mmol/L    BASE EXCESS 2.5 mmol/L    O2 METHOD BIPAP      FIO2 55 %    IPAP/PIP 16      PEEP/CPAP 6.0      Sample source ARTERIAL      SITE RIGHT RADIAL      PARUL'S TEST YES     GLUCOSE, POC    Collection Time: 02/18/21 10:51 PM   Result Value Ref Range    Glucose (POC) 187 (H) 65 - 100 mg/dL    Performed by Eloy Wan RN    METABOLIC PANEL, COMPREHENSIVE    Collection Time: 02/19/21 12:21 AM   Result Value Ref Range    Sodium 137 136 - 145 mmol/L    Potassium 4.8 3.5 - 5.1 mmol/L    Chloride 100 97 - 108 mmol/L    CO2 32 21 - 32 mmol/L    Anion gap 5 5 - 15 mmol/L    Glucose 246 (H) 65 - 100 mg/dL    BUN 26 (H) 6 - 20 MG/DL    Creatinine 1.12 (H) 0.55 - 1.02 MG/DL    BUN/Creatinine ratio 23 (H) 12 - 20      GFR est AA >60 >60 ml/min/1.73m2    GFR est non-AA 52 (L) >60 ml/min/1.73m2    Calcium 10.6 (H) 8.5 - 10.1 MG/DL    Bilirubin, total 0.4 0.2 - 1.0 MG/DL    ALT (SGPT) 34 12 - 78 U/L    AST (SGOT) 28 15 - 37 U/L    Alk. phosphatase 104 45 - 117 U/L    Protein, total 8.4 (H) 6.4 - 8.2 g/dL    Albumin 3.3 (L) 3.5 - 5.0 g/dL    Globulin 5.1 (H) 2.0 - 4.0 g/dL    A-G Ratio 0.6 (L) 1.1 - 2.2     CBC WITH AUTOMATED DIFF    Collection Time: 02/19/21 12:21 AM   Result Value Ref Range    WBC 9.9 3.6 - 11.0 K/uL    RBC 4.77 3.80 - 5.20 M/uL    HGB 13.2 11.5 - 16.0 g/dL    HCT 44.8 35.0 - 47.0 %    MCV 93.9 80.0 - 99.0 FL    MCH 27.7 26.0 - 34.0 PG    MCHC 29.5 (L) 30.0 - 36.5 g/dL    RDW 13.1 11.5 - 14.5 %    PLATELET 95 (L) 034 - 400 K/uL    MPV 11.6 8.9 - 12.9 FL    NRBC 0.0 0  WBC    ABSOLUTE NRBC 0.00 0.00 - 0.01 K/uL    NEUTROPHILS 93 (H) 32 - 75 %    LYMPHOCYTES 5 (L) 12 - 49 %    MONOCYTES 2 (L) 5 - 13 %    EOSINOPHILS 0 0 - 7 %    BASOPHILS 0 0 - 1 %    IMMATURE GRANULOCYTES 0 0.0 - 0.5 %    ABS. NEUTROPHILS 9.2 (H) 1.8 - 8.0 K/UL    ABS. LYMPHOCYTES 0.5 (L) 0.8 - 3.5 K/UL    ABS. MONOCYTES 0.2 0.0 - 1.0 K/UL    ABS. EOSINOPHILS 0.0 0.0 - 0.4 K/UL    ABS. BASOPHILS 0.0 0.0 - 0.1 K/UL    ABS. IMM.  GRANS. 0.0 0.00 - 0.04 K/UL    DF SMEAR SCANNED      RBC COMMENTS ROULEAUX  PRESENT        RBC COMMENTS ANISOCYTOSIS  1+       D DIMER    Collection Time: 02/19/21 12:21 AM   Result Value Ref Range    D-dimer 1.13 (H) 0.00 - 0.65 mg/L FEU   PROTHROMBIN TIME + INR    Collection Time: 02/19/21  3:41 AM   Result Value Ref Range    INR 1.0 0.9 - 1.1      Prothrombin time 10.5 9.0 - 14.2 sec   METABOLIC PANEL, COMPREHENSIVE    Collection Time: 02/19/21  3:41 AM   Result Value Ref Range    Sodium 134 (L) 136 - 145 mmol/L    Potassium 4.9 3.5 - 5.1 mmol/L    Chloride 98 97 - 108 mmol/L    CO2 31 21 - 32 mmol/L    Anion gap 5 5 - 15 mmol/L    Glucose 241 (H) 65 - 100 mg/dL    BUN 29 (H) 6 - 20 MG/DL    Creatinine 1.26 (H) 0.55 - 1.02 MG/DL    BUN/Creatinine ratio 23 (H) 12 - 20      GFR est AA 55 (L) >60 ml/min/1.73m2    GFR est non-AA 45 (L) >60 ml/min/1.73m2    Calcium 10.9 (H) 8.5 - 10.1 MG/DL    Bilirubin, total 0.4 0.2 - 1.0 MG/DL    ALT (SGPT) 34 12 - 78 U/L    AST (SGOT) 25 15 - 37 U/L    Alk. phosphatase 110 45 - 117 U/L    Protein, total 9.0 (H) 6.4 - 8.2 g/dL    Albumin 3.4 (L) 3.5 - 5.0 g/dL    Globulin 5.6 (H) 2.0 - 4.0 g/dL    A-G Ratio 0.6 (L) 1.1 - 2.2     CBC WITH AUTOMATED DIFF    Collection Time: 02/19/21  3:41 AM   Result Value Ref Range    WBC 7.6 3.6 - 11.0 K/uL    RBC 5.02 3.80 - 5.20 M/uL    HGB 13.9 11.5 - 16.0 g/dL    HCT 47.7 (H) 35.0 - 47.0 %    MCV 95.0 80.0 - 99.0 FL    MCH 27.7 26.0 - 34.0 PG    MCHC 29.1 (L) 30.0 - 36.5 g/dL    RDW 13.0 11.5 - 14.5 %    PLATELET 94 (L) 150 - 400 K/uL    MPV 11.5 8.9 - 12.9 FL    NRBC 0.0 0  WBC    ABSOLUTE NRBC 0.00 0.00 - 0.01 K/uL    NEUTROPHILS 92 (H) 32 - 75 %    LYMPHOCYTES 5 (L) 12 - 49 %    MONOCYTES 2 (L) 5 - 13 %    EOSINOPHILS 0 0 - 7 %    BASOPHILS 0 0 - 1 %    IMMATURE GRANULOCYTES 1 (H) 0.0 - 0.5 %    ABS. NEUTROPHILS 6.9 1.8 - 8.0 K/UL    ABS. LYMPHOCYTES 0.4 (L) 0.8 - 3.5 K/UL    ABS. MONOCYTES 0.2 0.0 - 1.0 K/UL    ABS. EOSINOPHILS 0.0 0.0 - 0.4 K/UL    ABS. BASOPHILS 0.0 0.0 - 0.1 K/UL    ABS. IMM. GRANS. 0.1 (H) 0.00 - 0.04 K/UL    DF AUTOMATED      RBC COMMENTS ROULEAUX  1+        RBC COMMENTS ANISOCYTOSIS  1+       FIBRINOGEN    Collection Time: 02/19/21  3:41 AM   Result Value Ref Range    Fibrinogen 621 (H) 200 - 475 mg/dL   D DIMER    Collection Time: 02/19/21  3:41 AM   Result Value Ref Range    D-dimer 0.63 0.00 - 0.65 mg/L FEU   TROPONIN I    Collection Time: 02/19/21  3:41 AM   Result Value Ref Range    Troponin-I, Qt. <0.05 <0.05 ng/mL   PROCALCITONIN    Collection Time: 02/19/21  3:41  AM   Result Value Ref Range    Procalcitonin 0.13 ng/mL   GLUCOSE, POC    Collection Time: 02/19/21  7:43 AM   Result Value Ref Range    Glucose (POC) 190 (H) 65 - 100 mg/dL    Performed by Rosario Jones PCT        Imaging:  I have personally reviewed the patients radiographs and have reviewed the reports:  No acute process on chest X-ray         Jef Salazar MD

## 2021-02-19 NOTE — ED NOTES
Hospitalist at bedside states patient can eat/drink and okay to take patient off Bipap temporarily. Resp called to be made aware patient will be off Bipap temporarily,  bipap taken off @ 2320 and 6L nasal cannula placed to allow patient to eat. Bipap to be placed 30min after patient finishes eating.   0015: Patient requested to take Flexeril, flexeril given, will place bipap on in 30min  0050: Patient placed back on Bipap, SPO2 93%

## 2021-02-19 NOTE — PROGRESS NOTES
0218: TRANSFER - IN REPORT:    Verbal report received from [address] (name) on [facility]  being received from ED(unit) for routine progression of care      Report consisted of patients Situation, Background, Assessment and   Recommendations(SBAR). Information from the following report(s) SBAR, Kardex, Intake/Output, MAR, Recent Results and Cardiac Rhythm nsr/st was reviewed with the receiving nurse. Opportunity for questions and clarification was provided. Assessment completed upon patients arrival to unit and care assumed. 0310: pt admitted to unit     End of Shift Note    Bedside shift change report given to joan (oncoming nurse) by Jean Monroe (offgoing nurse). Report included the following information SBAR, Kardex, Intake/Output, MAR, Recent Results and Cardiac Rhythm nsr/st    Shift worked:  night     Shift summary and any significant changes:    Pt VSS, on bipap @ 55%, wound care consult placed for BLE dry, scaly skin      Concerns for physician to address:  see above     Zone phone for oncoming shift:   [phone]       Activity:  Activity Level: Bed Rest, Up with Assistance  Number times ambulated in hallways past shift: 0  Number of times OOB to chair past shift: 0  Up to [facility] x1  Cardiac:   Cardiac Monitoring: Yes      Cardiac Rhythm: Normal sinus rhythm, Sinus tachycardia    Access:   Current line(s): PIV     Genitourinary:   Urinary status: voiding    Respiratory:   O2 Device: BIPAP  Chronic home O2 use?: YES  Incentive spirometer at bedside: NO     GI:  Last Bowel Movement Date: 02/18/21  Current diet:  DIET DIABETIC WITH OPTIONS Consistent Carb 1800kcal; Regular  Passing flatus: YES  Tolerating current diet: YES       Pain Management:   Patient states pain is manageable on current regimen: YES    Skin:  Jeffrey Score: 22  Interventions: float heels, increase time out of bed and PT/OT consult    Patient Safety:  Fall Score:  Total Score: 1  Interventions: assistive device (walker, cane, etc), gripper socks, pt to call before getting OOB and stay with me (per policy)       Length of Stay:  Expected LOS: - - -  Actual LOS: [address]

## 2021-02-19 NOTE — ED NOTES
Pt requesting to use restroom at this time. Pt up to bedside commode w one assist. Pt noted to be dyspnic and tachypnic w/ exertion. RAA at bedside for transport to [facility].

## 2021-02-19 NOTE — ED NOTES
Pt to be transferred to [facility] ER. Bedside shift change report given to Last (oncoming nurse) by Mayelin Mckeon (offgoing nurse). Report included the following information SBAR.

## 2021-02-19 NOTE — H&P
Hospitalist Admission Note    NAME: Teo Quiros   :  1970   MRN:  [id number]     Date/Time:  2021 10:32 PM    Patient PCP: Ethel Arceo MD  ________________________________________________________________________    My assessment of this patient's clinical condition and my plan of care is as follows. Assessment / Plan:  Acute hypercarbic respiratory failure due to  COVID-19 pneumonia  COPD exacerbation  Obesity hypoventilation syndrome  Restrictive lung disease due to super morbid obesity  -Rapid SARS-CoV-2 is positive  -On arrival to ED at [facility], PCT was 79 and improving to 68.  pH is also improving.  -Continue BiPAP for now and wean as tolerated  -Start Decadron and also remdesivir. Check D-dimers daily. Check procalcitonin in a.m. Consult pulmonary. Consider convalescent plasma with high titer antibodies if not improving  -If ABG worsens, consider early intubation  -Consider Tocilizumab based on oxygen needs  -Monitor inflammatory markers daily    Diabetes mellitus type 2  -Resume home Lantus at 35 units. Start insulin sliding scale with blood sugar checks as well  -Hold home glyburide    Super morbid obesity  Hypertension  Dyslipidemia  Diastolic congestive heart failure not in exacerbation  Coronary artery disease  Obesity hypoventilation syndrome  History of breast cancer with L2 sclerotic lesion on CT scan in 2019.  -Continue Lasix, metoprolol, statin, aspirin, home inhalers  -Consider repeating a CT of spine given L2 sclerotic lesion since I do not see any follow-up study. Informed her to please follow-up with primary care physician given her history of breast cancer as well. I have provided    50     minutes of critical care time.   During this entire length of time I was immediately available to the patient.       The reason for providing this level of medical care was due to a critical illness that impaired one or more vital organ systems, such that there was a high probability of imminent or life threatening deterioration in the patient's condition. This care involved high complexity decision making which includes reviewing the patient's past medical records, current laboratory results, and actual Xray films in order to assess, support vital system function, and to treat this degree of vital organ system failure, and to prevent further life threatening deterioration of the patients condition. Code Status: Full code  Surrogate Decision Maker: Sanford Holloway    DVT Prophylaxis: Lovenox      Baseline: From home        Subjective:   CHIEF COMPLAINT: Shortness of breath    HISTORY OF PRESENT ILLNESS:     Subha Boudreaux is a 52 y.o.   female who presents with past medical history of morbid obesity, COPD, obesity hypoventilation syndrome is coming the hospital chief complaints of shortness of breath. Patient initially presented to [facility] [place] with above symptoms and was noted to be Covid positive and was very hypoxic and started on BiPAP with improvement of oxygenation and was transferred to [facility].  Patient is currently on BiPAP and is in respiratory distress and is not a reliable historian so information is limited. On arrival here she is comfortable on BiPAP and saturating at about 93%. On labs she was noted to have a normal CBC. CMP shows a creatinine of 0.98. Glucose of 162. LFTs were normal.  Troponin is 0.05.  proBNP is 255. We were asked to admit for work up and evaluation of the above problems.      Past Medical History:   Diagnosis Date    Arthritis     Asthma     Breast lump     CAD (coronary artery disease)     Cancer (HCC)     breast cancer    Congestive heart failure (HCC)     COPD (chronic obstructive pulmonary disease) (HCC)     Diabetes ([address]     Hypertension     Morbid obesity with BMI of 70 and over, adult ([address]     NSTEMI (non-ST elevated myocardial infarction) ([address]     NSTEMI (non-ST elevated myocardial infarction) ([address] 2012    SVT (supraventricular tachycardia) (HCC)         Past Surgical History:   Procedure Laterality Date    HX  SECTION      HX ORTHOPAEDIC      HX OTHER SURGICAL      cyst removed from back    CT CARDIAC SURG PROCEDURE UNLIST      Stents       Social History     Tobacco Use    Smoking status: Former Smoker     Packs/day: 0.25     Types: Cigarettes    Smokeless tobacco: Never Used    Tobacco comment: Patient states \"I  aint smoking no more d*mn cigarettes after yesterday\" 2018   Substance Use Topics    Alcohol use: No        Family History   Problem Relation Age of Onset    Heart Disease Mother     Heart Disease Father     Heart Disease Sister     Breast Cancer Maternal Grandmother     Breast Cancer Paternal Grandmother      No Known Allergies     Prior to Admission medications    Medication Sig Start Date End Date Taking? Authorizing Provider   spironolactone (ALDACTONE) 25 mg tablet Take 1 Tab by mouth daily. 20   Yenny Simmons MD   cyclobenzaprine (FLEXERIL) 5 mg tablet Take 5 mg by mouth two (2) times a day. Provider, Historical   fluticasone-umeclidinium-vilanterol (Trelegy Ellipta) 100-62.5-25 mcg inhaler Take 1 Puff by inhalation daily. Provider, Historical   NovoLOG Flexpen U-100 Insulin 100 unit/mL (3 mL) inpn 5-10 Units by SubCUTAneous route as directed. Sliding scale: -300: 5 units -400 units: 10 units 20   Provider, Historical   ammonium lactate (LAC-HYDRIN) 12 % lotion Apply 1 Applicator to affected area as needed. rub in to affected area well    Provider, Historical   omeprazole (PRILOSEC) 40 mg capsule Take 40 mg by mouth daily. Provider, Historical   nystatin (MYCOSTATIN) topical cream Apply  to affected area two (2) times a day. 19   Alesia FERREIRA MD   albuterol-ipratropium (DUO-NEB) 2.5 mg-0.5 mg/3 ml nebu 3 mL by Nebulization route four (4) times daily.  3/3/19   Britt Ice MD TITI   furosemide (LASIX) 40 mg tablet Take 40 mg by mouth two (2) times a day. Other, MD Landon   fluticasone (FLONASE) 50 mcg/actuation nasal spray 2 Sprays by Both Nostrils route daily. 1/6/19   Ashley Mayo MD   insulin glargine (LANTUS) 100 unit/mL injection 35 Units by SubCUTAneous route daily. Provider, Historical   metoprolol tartrate (LOPRESSOR) 25 mg tablet Take 25 mg by mouth two (2) times a day. Provider, Historical   loratadine (CLARITIN) 10 mg tablet Take 10 mg by mouth daily. Provider, Historical   albuterol (VENTOLIN HFA) 90 mcg/actuation inhaler Take 2 Puffs by inhalation every six (6) hours as needed for Wheezing. Provider, Historical   aspirin 81 mg chewable tablet Take 81 mg by mouth daily. Provider, Historical   lovastatin (MEVACOR) 40 mg tablet Take 1 Tab by mouth nightly. 1/14/16   Ingrid Covarrubias NP   glyBURIDE (DIABETA) 5 mg tablet Take 5 mg by mouth two (2) times daily (with meals). Provider, Historical   nitroglycerin (NITROSTAT) 0.4 mg SL tablet 1 Tab by SubLINGual route every five (5) minutes as needed for Chest Pain (call 911 if not relieved by 3). 9/12/13   Texarkananiraj Covarrubias NP       REVIEW OF SYSTEMS:     I am not able to complete the review of systems because:    The patient is intubated and sedated    The patient has altered mental status due to his acute medical problems    The patient has baseline aphasia from prior stroke(s)    The patient has baseline dementia and is not reliable historian   y The patient is in acute medical distress and unable to provide information           Total of 12 systems reviewed as follows:       POSITIVE= underlined text  Negative = text not underlined  General:  fever, chills, sweats, generalized weakness, weight loss/gain,      loss of appetite   Eyes:    blurred vision, eye pain, loss of vision, double vision  ENT:    rhinorrhea, pharyngitis   Respiratory:   cough, sputum production, SOB, RAZO, wheezing, pleuritic pain Cardiology:   chest pain, palpitations, orthopnea, PND, edema, syncope   Gastrointestinal:  abdominal pain , N/V, diarrhea, dysphagia, constipation, bleeding   Genitourinary:  frequency, urgency, dysuria, hematuria, incontinence   Muskuloskeletal :  arthralgia, myalgia, back pain  Hematology:  easy bruising, nose or gum bleeding, lymphadenopathy   Dermatological: rash, ulceration, pruritis, color change / jaundice  Endocrine:   hot flashes or polydipsia   Neurological:  headache, dizziness, confusion, focal weakness, paresthesia,     Speech difficulties, memory loss, gait difficulty  Psychological: Feelings of anxiety, depression, agitation    Objective:   VITALS:    Visit Vitals  /72 (BP 1 Location: Left lower arm, BP Patient Position: At rest)   Pulse (!) 104   Temp 98.6 °F (37 °C)   Resp 14   Ht 5' 7\" (1.702 m)   Wt (!) 194.6 kg (429 lb)   SpO2 94%   BMI 67.19 kg/m²       PHYSICAL EXAM:    General:    In distress, on BiPAP, appears stated age, obese. HEENT: Atraumatic, anicteric sclerae, pink conjunctiva  Neck:  Supple, symmetrical,  thyroid: non tender  Lungs:   Bilateral air entry is diminished, wheezing present, no crackles  Chest wall:  No tenderness  No Accessory muscle use. Heart:   Regular  rhythm,  No  murmur   2+ edema present  Abdomen:   Soft, non-tender. Not distended. Bowel sounds normal  Extremities: No cyanosis. No clubbing,      Skin turgor normal, Capillary refill normal, Radial dial pulse 2+  Skin:     Not pale.   Not Jaundiced  No rashes   Psych:  Pleasant  Neurologic: Alert, awake, moving all 4 extremities    _______________________________________________________________________  Care Plan discussed with:    Comments   Patient y    Family      RN y    Care Manager                    Consultant:      _______________________________________________________________________  Expected  Disposition:   Home with Family y   HH/PT/OT/RN    SNF/LTC    LUIS A ________________________________________________________________________  TOTAL TIME:  60  Minutes    Critical Care Provided     Minutes non procedure based      Comments    y Reviewed previous records   >50% of visit spent in counseling and coordination of care y Discussion with patient and/or family and questions answered       ________________________________________________________________________  Signed: Jeanne Lechuga MD    Procedures: see electronic medical records for all procedures/Xrays and details which were not copied into this note but were reviewed prior to creation of Plan. LAB DATA REVIEWED:    Recent Results (from the past 24 hour(s))   BLOOD GAS + IONIZED CALCIUM    Collection Time: 02/18/21  4:46 PM   Result Value Ref Range    pH 7.26 (L) 7.35 - 7.45      PCO2 79 (H) 35 - 45 mmHg    PO2 101 (H) 80 - 100 mmHg    BICARBONATE 35 (H) 22 - 26 mmol/L    BASE EXCESS 4.5 mmol/L    O2 SAT 98 (H) 92 - 97 %    Calcium, ionized 1.47 (H) 1.13 - 1.32 mmol/L    O2 METHOD NASAL CANNULA      O2 FLOW RATE 5.00 L/min    Sample source ARTERIAL      SITE RIGHT RADIAL      PARUL'S TEST YES     METABOLIC PANEL, COMPREHENSIVE    Collection Time: 02/18/21  4:53 PM   Result Value Ref Range    Sodium 140 136 - 145 mmol/L    Potassium 4.4 3.5 - 5.1 mmol/L    Chloride 102 97 - 108 mmol/L    CO2 35 (H) 21 - 32 mmol/L    Anion gap 3 (L) 5 - 15 mmol/L    Glucose 162 (H) 65 - 100 mg/dL    BUN 21 (H) 6 - 20 MG/DL    Creatinine 0.98 0.55 - 1.02 MG/DL    BUN/Creatinine ratio 21 (H) 12 - 20      GFR est AA >60 >60 ml/min/1.73m2    GFR est non-AA >60 >60 ml/min/1.73m2    Calcium 10.8 (H) 8.5 - 10.1 MG/DL    Bilirubin, total 0.4 0.2 - 1.0 MG/DL    ALT (SGPT) 39 12 - 78 U/L    AST (SGOT) 29 15 - 37 U/L    Alk.  phosphatase 105 45 - 117 U/L    Protein, total 8.8 (H) 6.4 - 8.2 g/dL    Albumin 3.4 (L) 3.5 - 5.0 g/dL    Globulin 5.4 (H) 2.0 - 4.0 g/dL    A-G Ratio 0.6 (L) 1.1 - 2.2     CBC WITH AUTOMATED DIFF    Collection Time: 02/18/21  4:53 PM   Result Value Ref Range    WBC 8.8 3.6 - 11.0 K/uL    RBC 5.25 (H) 3.80 - 5.20 M/uL    HGB 14.8 11.5 - 16.0 g/dL    HCT 49.0 (H) 35.0 - 47.0 %    MCV 93.3 80.0 - 99.0 FL    MCH 28.2 26.0 - 34.0 PG    MCHC 30.2 30.0 - 36.5 g/dL    RDW 12.8 11.5 - 14.5 %    PLATELET 936 (L) 835 - 400 K/uL    MPV 11.1 8.9 - 12.9 FL    NRBC 0.0 0  WBC    ABSOLUTE NRBC 0.00 0.00 - 0.01 K/uL    NEUTROPHILS 75 32 - 75 %    BAND NEUTROPHILS 3 0 - 6 %    LYMPHOCYTES 7 (L) 12 - 49 %    MONOCYTES 14 (H) 5 - 13 %    EOSINOPHILS 1 0 - 7 %    BASOPHILS 0 0 - 1 %    IMMATURE GRANULOCYTES 0 %    ABS. NEUTROPHILS 6.9 1.8 - 8.0 K/UL    ABS. LYMPHOCYTES 0.6 (L) 0.8 - 3.5 K/UL    ABS. MONOCYTES 1.2 (H) 0.0 - 1.0 K/UL    ABS. EOSINOPHILS 0.1 0.0 - 0.4 K/UL    ABS. BASOPHILS 0.0 0.0 - 0.1 K/UL    ABS. IMM.  GRANS. 0.0 K/UL    DF MANUAL      RBC COMMENTS NORMOCYTIC, NORMOCHROMIC     NT-PRO BNP    Collection Time: 02/18/21  4:53 PM   Result Value Ref Range    NT pro- (H) 0 - 125 PG/ML   TROPONIN I    Collection Time: 02/18/21  4:53 PM   Result Value Ref Range    Troponin-I, Qt. <0.05 <0.05 ng/mL   SARS-COV-2    Collection Time: 02/18/21  6:26 PM   Result Value Ref Range    SARS-CoV-2 Please find results under separate order     COVID-19 RAPID TEST    Collection Time: 02/18/21  6:26 PM   Result Value Ref Range    Specimen source Nasopharyngeal      COVID-19 rapid test Detected (AA) NOTD     BLOOD GAS, ARTERIAL    Collection Time: 02/18/21  7:46 PM   Result Value Ref Range    pH 7.30 (L) 7.35 - 7.45      PCO2 68 (H) 35 - 45 mmHg    PO2 88 80 - 100 mmHg    O2 SAT 97 92 - 97 %    BICARBONATE 33 (H) 22 - 26 mmol/L    BASE EXCESS 3.9 mmol/L    O2 METHOD BIPAP      FIO2 40 %    MODE BIPAP      SPONTANEOUS RATE 22      IPAP/PIP 16      EPAP/CPAP/PEEP 6      Sample source ARTERIAL      SITE RR      PARUL'S TEST YES     EKG, 12 LEAD, INITIAL    Collection Time: 02/18/21  9:50 PM   Result Value Ref Range    Ventricular Rate 112 BPM Atrial Rate 112 BPM    P-R Interval 186 ms    QRS Duration 72 ms    Q-T Interval 312 ms    QTC Calculation (Bezet) 425 ms    Calculated P Axis 72 degrees    Calculated R Axis 104 degrees    Calculated T Axis 72 degrees    Diagnosis       Sinus tachycardia  Rightward axis  When compared with ECG of 17-AUG-2020 00:38,  premature ventricular complexes are no longer present  premature atrial complexes are no longer present     BLOOD GAS, ARTERIAL    Collection Time: 02/18/21 10:29 PM   Result Value Ref Range    pH 7.29 (L) 7.35 - 7.45      PCO2 66 (H) 35 - 45 mmHg    PO2 78 (L) 80 - 100 mmHg    O2 SAT 94 92 - 97 %    BICARBONATE 31 (H) 22 - 26 mmol/L    BASE EXCESS 2.5 mmol/L    O2 METHOD BIPAP      FIO2 55 %    IPAP/PIP 16      PEEP/CPAP 6.0      Sample source ARTERIAL      SITE RIGHT RADIAL      PARUL'S TEST YES

## 2021-02-19 NOTE — ED PROVIDER NOTES
EMERGENCY DEPARTMENT HISTORY AND PHYSICAL EXAM      Date: 2/18/2021  Patient Name: Caryn Balderrama    History of Presenting Illness     Chief Complaint   Patient presents with    Shortness of Breath       History Provided By: Patient      Additional History (Context): Caryn Balderrama is a 52 y.o. female with Asthma, Arthritis, HTN, CHF, CAD, COPD, DM, Breast CA who presents via EMS for SOB. Onset > 1 week. Pt reports sx worsen yesterday. States she called [facility] and was advised to go to the ER. Reports EMS evaluated but she stayed home. Today she called EMS due to worsening sx. States she has a productive cough with green sputum. Denies CP or wheezing. States she is normally on 5L nC at baseline for COPD. States she has a hx of frequent hospitalization due to low oxygen levels. Last hospitalized > 6 months ago and reports feeling fine until now. Denies fever, chills, loss of taste or smell. Reports nasal sx prior to cough. States she gets tested frequently for COVID and has been negative. Deneis weight gain or leg swelling to indicate fluid overload    PCP: Bharath Crews MD    Current Outpatient Medications   Medication Sig Dispense Refill    spironolactone (ALDACTONE) 25 mg tablet Take 1 Tab by mouth daily. 30 Tab 0    cyclobenzaprine (FLEXERIL) 5 mg tablet Take 5 mg by mouth two (2) times a day.  fluticasone-umeclidinium-vilanterol (Trelegy Ellipta) 100-62.5-25 mcg inhaler Take 1 Puff by inhalation daily.  NovoLOG Flexpen U-100 Insulin 100 unit/mL (3 mL) inpn 5-10 Units by SubCUTAneous route as directed. Sliding scale: -300: 5 units -400 units: 10 units      ammonium lactate (LAC-HYDRIN) 12 % lotion Apply 1 Applicator to affected area as needed. rub in to affected area well      omeprazole (PRILOSEC) 40 mg capsule Take 40 mg by mouth daily.  nystatin (MYCOSTATIN) topical cream Apply  to affected area two (2) times a day.  15 g 0    albuterol-ipratropium (DUO-NEB) 2.5 mg-0.5 mg/3 ml nebu 3 mL by Nebulization route four (4) times daily. 100 Nebule 1    furosemide (LASIX) 40 mg tablet Take 40 mg by mouth two (2) times a day.  fluticasone (FLONASE) 50 mcg/actuation nasal spray 2 Sprays by Both Nostrils route daily. 1 Bottle 0    insulin glargine (LANTUS) 100 unit/mL injection 35 Units by SubCUTAneous route daily.  metoprolol tartrate (LOPRESSOR) 25 mg tablet Take 25 mg by mouth two (2) times a day.  loratadine (CLARITIN) 10 mg tablet Take 10 mg by mouth daily.  albuterol (VENTOLIN HFA) 90 mcg/actuation inhaler Take 2 Puffs by inhalation every six (6) hours as needed for Wheezing.  aspirin 81 mg chewable tablet Take 81 mg by mouth daily.  lovastatin (MEVACOR) 40 mg tablet Take 1 Tab by mouth nightly. 30 Tab 6    glyBURIDE (DIABETA) 5 mg tablet Take 5 mg by mouth two (2) times daily (with meals).  nitroglycerin (NITROSTAT) 0.4 mg SL tablet 1 Tab by SubLINGual route every five (5) minutes as needed for Chest Pain (call 911 if not relieved by 3).  25 Tab 2     Facility-Administered Medications Ordered in Other Encounters   Medication Dose Route Frequency Provider Last Rate Last Admin    acetaminophen (TYLENOL) tablet 650 mg  650 mg Oral Q6H PRN Esequiel Lynch MD        ondansetron [facility]) injection 4 mg  4 mg IntraVENous Q4H PRN Esequiel Lynch MD           Past History     Past Medical History:  Past Medical History:   Diagnosis Date    Arthritis     Asthma     Breast lump     CAD (coronary artery disease)     Cancer ([facility]     breast cancer    Congestive heart failure (HCC)     COPD (chronic obstructive pulmonary disease) (HCC)     Diabetes ([facility]     Hypertension     [facility] with BMI of 70 and over[facility]     NSTEMI (non-ST elevated myocardial infarction) ([facility]     NSTEMI (non-ST elevated myocardial infarction) ([facility] 8/18/2012    SVT [facility]supraventricular tachycardia) ([facility]        Past Surgical History:  Past Surgical History:   Procedure Laterality Date    HX  SECTION      HX ORTHOPAEDIC      HX OTHER SURGICAL      cyst removed from back    [place] CARDIAC SURG PROCEDURE UNLIST      Stents       Family History:  Family History   Problem Relation Age of Onset    Heart Disease Mother     Heart Disease Father     Heart Disease Sister     Breast Cancer Maternal Grandmother     Breast Cancer Paternal Grandmother        Social History:  Social History     Tobacco Use    Smoking status: Former Smoker     Packs/day: 0.25     Types: Cigarettes    Smokeless tobacco: Never Used    Tobacco comment: Patient states \"I  aint smoking no more d*mn cigarettes after yesterday\" 2018   Substance Use Topics    Alcohol use: No    Drug use: No       Allergies:  No Known Allergies      Review of Systems   Review of Systems   Constitutional: Negative for appetite change, chills, fatigue and fever. HENT: Negative for congestion, ear pain and rhinorrhea. Eyes: Negative for pain and itching. Respiratory: Positive for cough and shortness of breath. Negative for chest tightness and wheezing. Cardiovascular: Negative for chest pain, palpitations and leg swelling. Gastrointestinal: Negative for abdominal pain, nausea and vomiting. Genitourinary: Negative for dysuria, frequency and urgency. Musculoskeletal: Negative for arthralgias, back pain and joint swelling. Skin: Negative for color change and rash. Neurological: Negative for dizziness, numbness and headaches. All other systems reviewed and are negative. Physical Exam     Vitals:    21 1634 21 1829 21 1839 21 1958   BP: (!) 135/95 133/89  119/88   Pulse: 100 (!) 103  (!) 112   Resp: 18   20   Temp: 98.8 °F (37.1 °C)   98.2 °F (36.8 °C)   SpO2: 96% 97% 97% 100%   Weight: (!) 194.6 kg (429 lb)      Height: 5' 7\" (1.702 m)        Physical Exam  Vitals signs and nursing note reviewed.    Constitutional:       General: She is not in acute distress. Appearance: She is well-developed. She is obese. She is ill-appearing. She is not toxic-appearing or diaphoretic. Comments: Fatigue    HENT:      Head: Normocephalic and atraumatic. Right Ear: Tympanic membrane and ear canal normal.      Left Ear: Tympanic membrane and ear canal normal.      Nose: Nose normal.      Mouth/Throat:      Mouth: Mucous membranes are moist.      Pharynx: Oropharynx is clear. Eyes:      Extraocular Movements: Extraocular movements intact. Conjunctiva/sclera: Conjunctivae normal.      Pupils: Pupils are equal, round, and reactive to light. Neck:      Musculoskeletal: Normal range of motion and neck supple. Cardiovascular:      Rate and Rhythm: Normal rate and regular rhythm. Pulses: Normal pulses. Heart sounds: Normal heart sounds. Pulmonary:      Effort: No accessory muscle usage. Breath sounds: Decreased breath sounds present. Comments: Mildly labored respiration   Abdominal:      Palpations: Abdomen is soft. Tenderness: There is no abdominal tenderness. There is no guarding or rebound. Musculoskeletal: Normal range of motion. Skin:     General: Skin is warm and dry. Comments: Chronic lymphedema with scaling skin    Neurological:      Mental Status: She is alert and oriented to person, place, and time.            Diagnostic Study Results     Labs -     Recent Results (from the past 12 hour(s))   BLOOD GAS + IONIZED CALCIUM    Collection Time: 02/18/21  4:46 PM   Result Value Ref Range    pH 7.26 (L) 7.35 - 7.45      PCO2 79 (H) 35 - 45 mmHg    PO2 101 (H) 80 - 100 mmHg    BICARBONATE 35 (H) 22 - 26 mmol/L    BASE EXCESS 4.5 mmol/L    O2 SAT 98 (H) 92 - 97 %    Calcium, ionized 1.47 (H) 1.13 - 1.32 mmol/L    O2 METHOD NASAL CANNULA      O2 FLOW RATE 5.00 L/min    Sample source ARTERIAL      SITE RIGHT RADIAL      PARUL'S TEST YES     METABOLIC PANEL, COMPREHENSIVE    Collection Time: 02/18/21  4:53 PM   Result Value Ref Range    Sodium 140 136 - 145 mmol/L    Potassium 4.4 3.5 - 5.1 mmol/L    Chloride 102 97 - 108 mmol/L    CO2 35 (H) 21 - 32 mmol/L    Anion gap 3 (L) 5 - 15 mmol/L    Glucose 162 (H) 65 - 100 mg/dL    BUN 21 (H) 6 - 20 MG/DL    Creatinine 0.98 0.55 - 1.02 MG/DL    BUN/Creatinine ratio 21 (H) 12 - 20      GFR est AA >60 >60 ml/min/1.73m2    GFR est non-AA >60 >60 ml/min/1.73m2    Calcium 10.8 (H) 8.5 - 10.1 MG/DL    Bilirubin, total 0.4 0.2 - 1.0 MG/DL    ALT (SGPT) 39 12 - 78 U/L    AST (SGOT) 29 15 - 37 U/L    Alk. phosphatase 105 45 - 117 U/L    Protein, total 8.8 (H) 6.4 - 8.2 g/dL    Albumin 3.4 (L) 3.5 - 5.0 g/dL    Globulin 5.4 (H) 2.0 - 4.0 g/dL    A-G Ratio 0.6 (L) 1.1 - 2.2     CBC WITH AUTOMATED DIFF    Collection Time: 02/18/21  4:53 PM   Result Value Ref Range    WBC 8.8 3.6 - 11.0 K/uL    RBC 5.25 (H) 3.80 - 5.20 M/uL    HGB 14.8 11.5 - 16.0 g/dL    HCT 49.0 (H) 35.0 - 47.0 %    MCV 93.3 80.0 - 99.0 FL    MCH 28.2 26.0 - 34.0 PG    MCHC 30.2 30.0 - 36.5 g/dL    RDW 12.8 11.5 - 14.5 %    PLATELET 334 (L) 690 - 400 K/uL    MPV 11.1 8.9 - 12.9 FL    NRBC 0.0 0  WBC    ABSOLUTE NRBC 0.00 0.00 - 0.01 K/uL    NEUTROPHILS 75 32 - 75 %    BAND NEUTROPHILS 3 0 - 6 %    LYMPHOCYTES 7 (L) 12 - 49 %    MONOCYTES 14 (H) 5 - 13 %    EOSINOPHILS 1 0 - 7 %    BASOPHILS 0 0 - 1 %    IMMATURE GRANULOCYTES 0 %    ABS. NEUTROPHILS 6.9 1.8 - 8.0 K/UL    ABS. LYMPHOCYTES 0.6 (L) 0.8 - 3.5 K/UL    ABS. MONOCYTES 1.2 (H) 0.0 - 1.0 K/UL    ABS. EOSINOPHILS 0.1 0.0 - 0.4 K/UL    ABS. BASOPHILS 0.0 0.0 - 0.1 K/UL    ABS. IMM.  GRANS. 0.0 K/UL    DF MANUAL      RBC COMMENTS NORMOCYTIC, NORMOCHROMIC     NT-PRO BNP    Collection Time: 02/18/21  4:53 PM   Result Value Ref Range    NT pro- (H) 0 - 125 PG/ML   TROPONIN I    Collection Time: 02/18/21  4:53 PM   Result Value Ref Range    Troponin-I, Qt. <0.05 <0.05 ng/mL   SARS-COV-2    Collection Time: 02/18/21  6:26 PM   Result Value Ref Range    SARS-CoV-2 Please find results under separate order     COVID-19 RAPID TEST    Collection Time: 02/18/21  6:26 PM   Result Value Ref Range    Specimen source Nasopharyngeal      COVID-19 rapid test Detected (AA) NOTD     BLOOD GAS, ARTERIAL    Collection Time: 02/18/21  7:46 PM   Result Value Ref Range    pH 7.30 (L) 7.35 - 7.45      PCO2 68 (H) 35 - 45 mmHg    PO2 88 80 - 100 mmHg    O2 SAT 97 92 - 97 %    BICARBONATE 33 (H) 22 - 26 mmol/L    BASE EXCESS 3.9 mmol/L    O2 METHOD BIPAP      FIO2 40 %    MODE BIPAP      SPONTANEOUS RATE 22      IPAP/PIP 16      EPAP/CPAP/PEEP 6      Sample source ARTERIAL      SITE RR      PARLU'S TEST YES     EKG, 12 LEAD, INITIAL    Collection Time: 02/18/21  9:50 PM   Result Value Ref Range    Ventricular Rate 112 BPM    Atrial Rate 112 BPM    P-R Interval 186 ms    QRS Duration 72 ms    Q-T Interval 312 ms    QTC Calculation (Bezet) 425 ms    Calculated P Axis 72 degrees    Calculated R Axis 104 degrees    Calculated T Axis 72 degrees    Diagnosis       Sinus tachycardia  Rightward axis  When compared with ECG of 17-AUG-2020 00:38,  premature ventricular complexes are no longer present  premature atrial complexes are no longer present         Radiologic Studies -   XR CHEST PORT   Final Result   1. Enlarged cardiac silhouette. Prominence of the right hilum is felt to be   technical. When the patient can tolerate recommend PA and lateral with good   inspiration otherwise no acute abnormality           CT Results  (Last 48 hours)    None        CXR Results  (Last 48 hours)               02/18/21 1649  XR CHEST PORT Final result    Impression:  1. Enlarged cardiac silhouette. Prominence of the right hilum is felt to be   technical. When the patient can tolerate recommend PA and lateral with good   inspiration otherwise no acute abnormality           Narrative:  INDICATION:  SOB hx of COPD and CHF        EXAM: Portable chest 1645 hours. Film is rotated.  Comparison 8/17/2020       FINDINGS: Cardiac silhouette remains enlarged. Right hilum is prominent however   this is accentuated by positioning. No pulmonary vascular congestion. No   consolidation. No pneumothorax or effusion                   Medical Decision Making   I am the first provider for this patient. I reviewed the vital signs, available nursing notes, past medical history, past surgical history, family history and social history. Vital Signs-Reviewed the patient's vital signs. Pulse Oximetry Analysis - 96% on 5L NC    Cardiac Monitor:  Rate: 112  Rhythm:Sinus Tachycardic:    Records Reviewed: Nursing Notes, Old Medical Records, Previous electrocardiograms, Previous Radiology Studies and Previous Laboratory Studies    53 yo F with c/o SOB exhibiting mildly labored respirations but normal rate. Lungs diminished but no crackles or wheezing. Pt is on 5L NC baseline with oxygen saturations 94-96%. However, due to pt hx of hypercapnia with acute on chronic respiratory failure I am going to obtain labs including ABG. CXR ordered to evaluate for fluid overload       ED Course:   ED Course as of Feb 18 2208   Thu Feb 18, 2021   1745  which is decreased from 6 months ago. PH 7.26 with CO2 79 and bicarb 35 on ABG concerning for respiratory acidosis. Pt is requesting for Bipap stating that usually helps her and she is feeling tired. Discussed case with attending Dr Ibarra and he agrees with consulation for admission    [NA]   [phone] Discussed case and exam with Dr Jace Castro. He declines patient at this time until her repeat ABG results have improved after Bipap for 1 hour. Plan to obtain rapid COVID while awaiting for repeat ABG    [NA]   1904 Pt rapid COVID is positive. Due to pt BIPAP requirement and multiple cormobidities pt will be transferred to [address] [place] [address] for higher acuity care.  Discussed case with [address] [place] [address] ED attending Dr Heriberto Topete and he accepts pt for transfer.    [NA]      ED Course User Index  [NA] Reshma Beltran NP Disposition:  Transfer       Follow-up Information    None         Discharge Medication List as of 2/18/2021  9:02 PM          Provider Notes (Medical Decision Making):     DDX:PNA, pleural effusion, pulmonary edema, CHF exacerbation, COVID, COPD exacerbation       Procedures:  Procedures    Core Measures:    Critical Care Time:   CRITICAL CARE NOTE :    7:08 PM      IMPENDING DETERIORATION -Airway and Respiratory    ASSOCIATED RISK FACTORS - Hypoxia    MANAGEMENT- Transfer    INTERPRETATION -  Xrays, Blood Gases and ECG    INTERVENTIONS - vent mngmt    CASE REVIEW - Hospitalist/Intensivist    TREATMENT RESPONSE -Stable    PERFORMED BY - Self and and Dr Nissa Whitaker        NOTES   :      I have spent 60 minutes of critical care time involved in lab review, consultations with specialist, family decision- making, bedside attention and documentation. During this entire length of time I was immediately available to the patient . Reshma Longoria NP                                                Diagnosis     Clinical Impression:   1. COVID-19    2. Chronic obstructive pulmonary disease with acute exacerbation ([facility]    3.  Acute on chronic respiratory failure with hypercapnia (HCC)

## 2021-02-20 LAB
ALBUMIN SERPL-MCNC: 3 G/DL (ref 3.5–5)
ALBUMIN/GLOB SERPL: 0.6 {RATIO} (ref 1.1–2.2)
ALP SERPL-CCNC: 89 U/L (ref 45–117)
ALT SERPL-CCNC: 31 U/L (ref 12–78)
ANION GAP SERPL CALC-SCNC: 3 MMOL/L (ref 5–15)
AST SERPL-CCNC: 24 U/L (ref 15–37)
BASOPHILS # BLD: 0 K/UL (ref 0–0.1)
BASOPHILS NFR BLD: 0 % (ref 0–1)
BILIRUB SERPL-MCNC: 0.4 MG/DL (ref 0.2–1)
BUN SERPL-MCNC: 29 MG/DL (ref 6–20)
BUN/CREAT SERPL: 32 (ref 12–20)
CALCIUM SERPL-MCNC: 10.4 MG/DL (ref 8.5–10.1)
CHLORIDE SERPL-SCNC: 100 MMOL/L (ref 97–108)
CO2 SERPL-SCNC: 33 MMOL/L (ref 21–32)
CREAT SERPL-MCNC: 0.92 MG/DL (ref 0.55–1.02)
CRP SERPL-MCNC: 3.26 MG/DL (ref 0–0.6)
D DIMER PPP FEU-MCNC: 0.44 MG/L FEU (ref 0–0.65)
DIFFERENTIAL METHOD BLD: ABNORMAL
EOSINOPHIL # BLD: 0 K/UL (ref 0–0.4)
EOSINOPHIL NFR BLD: 0 % (ref 0–7)
ERYTHROCYTE [DISTWIDTH] IN BLOOD BY AUTOMATED COUNT: 13.1 % (ref 11.5–14.5)
GLOBULIN SER CALC-MCNC: 4.7 G/DL (ref 2–4)
GLUCOSE BLD STRIP.AUTO-MCNC: 117 MG/DL (ref 65–100)
GLUCOSE BLD STRIP.AUTO-MCNC: 130 MG/DL (ref 65–100)
GLUCOSE BLD STRIP.AUTO-MCNC: 169 MG/DL (ref 65–100)
GLUCOSE BLD STRIP.AUTO-MCNC: 210 MG/DL (ref 65–100)
GLUCOSE SERPL-MCNC: 178 MG/DL (ref 65–100)
HCT VFR BLD AUTO: 42.8 % (ref 35–47)
HGB BLD-MCNC: 12.8 G/DL (ref 11.5–16)
IMM GRANULOCYTES # BLD AUTO: 0 K/UL (ref 0–0.04)
IMM GRANULOCYTES NFR BLD AUTO: 0 % (ref 0–0.5)
LYMPHOCYTES # BLD: 0.5 K/UL (ref 0.8–3.5)
LYMPHOCYTES NFR BLD: 8 % (ref 12–49)
MCH RBC QN AUTO: 27.8 PG (ref 26–34)
MCHC RBC AUTO-ENTMCNC: 29.9 G/DL (ref 30–36.5)
MCV RBC AUTO: 92.8 FL (ref 80–99)
MONOCYTES # BLD: 0.7 K/UL (ref 0–1)
MONOCYTES NFR BLD: 10 % (ref 5–13)
NEUTS SEG # BLD: 5.6 K/UL (ref 1.8–8)
NEUTS SEG NFR BLD: 82 % (ref 32–75)
NRBC # BLD: 0 K/UL (ref 0–0.01)
NRBC BLD-RTO: 0 PER 100 WBC
PLATELET # BLD AUTO: 102 K/UL (ref 150–400)
PMV BLD AUTO: 11.5 FL (ref 8.9–12.9)
POTASSIUM SERPL-SCNC: 4.6 MMOL/L (ref 3.5–5.1)
PROT SERPL-MCNC: 7.7 G/DL (ref 6.4–8.2)
RBC # BLD AUTO: 4.61 M/UL (ref 3.8–5.2)
SERVICE CMNT-IMP: ABNORMAL
SODIUM SERPL-SCNC: 136 MMOL/L (ref 136–145)
WBC # BLD AUTO: 6.8 K/UL (ref 3.6–11)

## 2021-02-20 PROCEDURE — 74011250637 HC RX REV CODE- 250/637: Performed by: INTERNAL MEDICINE

## 2021-02-20 PROCEDURE — 74011250636 HC RX REV CODE- 250/636: Performed by: INTERNAL MEDICINE

## 2021-02-20 PROCEDURE — 74011000250 HC RX REV CODE- 250: Performed by: INTERNAL MEDICINE

## 2021-02-20 PROCEDURE — 77010033711 HC HIGH FLOW OXYGEN

## 2021-02-20 PROCEDURE — 86140 C-REACTIVE PROTEIN: CPT

## 2021-02-20 PROCEDURE — 85379 FIBRIN DEGRADATION QUANT: CPT

## 2021-02-20 PROCEDURE — 82962 GLUCOSE BLOOD TEST: CPT

## 2021-02-20 PROCEDURE — 74011250637 HC RX REV CODE- 250/637: Performed by: NURSE PRACTITIONER

## 2021-02-20 PROCEDURE — 94640 AIRWAY INHALATION TREATMENT: CPT

## 2021-02-20 PROCEDURE — 74011636637 HC RX REV CODE- 636/637: Performed by: INTERNAL MEDICINE

## 2021-02-20 PROCEDURE — 94660 CPAP INITIATION&MGMT: CPT

## 2021-02-20 PROCEDURE — 80053 COMPREHEN METABOLIC PANEL: CPT

## 2021-02-20 PROCEDURE — 65660000000 HC RM CCU STEPDOWN

## 2021-02-20 PROCEDURE — 36415 COLL VENOUS BLD VENIPUNCTURE: CPT

## 2021-02-20 PROCEDURE — 85025 COMPLETE CBC W/AUTO DIFF WBC: CPT

## 2021-02-20 PROCEDURE — 74011000258 HC RX REV CODE- 258: Performed by: INTERNAL MEDICINE

## 2021-02-20 RX ORDER — LORATADINE 10 MG/1
10 TABLET ORAL DAILY
Status: DISCONTINUED | OUTPATIENT
Start: 2021-02-20 | End: 2021-02-24 | Stop reason: HOSPADM

## 2021-02-20 RX ADMIN — ENOXAPARIN SODIUM 40 MG: 40 INJECTION SUBCUTANEOUS at 08:32

## 2021-02-20 RX ADMIN — AZITHROMYCIN MONOHYDRATE 500 MG: 500 INJECTION, POWDER, LYOPHILIZED, FOR SOLUTION INTRAVENOUS at 11:28

## 2021-02-20 RX ADMIN — ALBUTEROL SULFATE 2 PUFF: 90 AEROSOL, METERED RESPIRATORY (INHALATION) at 21:15

## 2021-02-20 RX ADMIN — ATORVASTATIN CALCIUM 10 MG: 10 TABLET, FILM COATED ORAL at 08:34

## 2021-02-20 RX ADMIN — ALBUTEROL SULFATE 2 PUFF: 90 AEROSOL, METERED RESPIRATORY (INHALATION) at 14:00

## 2021-02-20 RX ADMIN — ALBUTEROL SULFATE 2 PUFF: 90 AEROSOL, METERED RESPIRATORY (INHALATION) at 08:00

## 2021-02-20 RX ADMIN — LORATADINE 10 MG: 10 TABLET ORAL at 11:28

## 2021-02-20 RX ADMIN — INSULIN LISPRO 2 UNITS: 100 INJECTION, SOLUTION INTRAVENOUS; SUBCUTANEOUS at 08:32

## 2021-02-20 RX ADMIN — INSULIN GLARGINE 35 UNITS: 100 INJECTION, SOLUTION SUBCUTANEOUS at 08:31

## 2021-02-20 RX ADMIN — FLUTICASONE PROPIONATE 2 SPRAY: 50 SPRAY, METERED NASAL at 08:36

## 2021-02-20 RX ADMIN — ENOXAPARIN SODIUM 40 MG: 40 INJECTION SUBCUTANEOUS at 22:51

## 2021-02-20 RX ADMIN — FUROSEMIDE 40 MG: 40 TABLET ORAL at 17:11

## 2021-02-20 RX ADMIN — DEXAMETHASONE SODIUM PHOSPHATE 6 MG: 4 INJECTION, SOLUTION INTRA-ARTICULAR; INTRALESIONAL; INTRAMUSCULAR; INTRAVENOUS; SOFT TISSUE at 22:51

## 2021-02-20 RX ADMIN — PANTOPRAZOLE SODIUM 40 MG: 40 TABLET, DELAYED RELEASE ORAL at 08:36

## 2021-02-20 RX ADMIN — SPIRONOLACTONE 25 MG: 25 TABLET ORAL at 08:34

## 2021-02-20 RX ADMIN — ASPIRIN 81 MG: 81 TABLET, CHEWABLE ORAL at 08:34

## 2021-02-20 RX ADMIN — ACETAMINOPHEN 650 MG: 325 TABLET ORAL at 22:51

## 2021-02-20 RX ADMIN — METOPROLOL TARTRATE 25 MG: 25 TABLET, FILM COATED ORAL at 08:35

## 2021-02-20 RX ADMIN — REMDESIVIR 100 MG: 100 INJECTION, POWDER, LYOPHILIZED, FOR SOLUTION INTRAVENOUS at 08:42

## 2021-02-20 RX ADMIN — ALBUTEROL SULFATE 2 PUFF: 90 AEROSOL, METERED RESPIRATORY (INHALATION) at 02:28

## 2021-02-20 RX ADMIN — METOPROLOL TARTRATE 25 MG: 25 TABLET, FILM COATED ORAL at 17:11

## 2021-02-20 RX ADMIN — CYCLOBENZAPRINE 5 MG: 10 TABLET, FILM COATED ORAL at 08:33

## 2021-02-20 RX ADMIN — ACETAMINOPHEN 650 MG: 325 TABLET ORAL at 08:48

## 2021-02-20 RX ADMIN — INSULIN LISPRO 3 UNITS: 100 INJECTION, SOLUTION INTRAVENOUS; SUBCUTANEOUS at 11:28

## 2021-02-20 RX ADMIN — CYCLOBENZAPRINE 5 MG: 10 TABLET, FILM COATED ORAL at 17:10

## 2021-02-20 RX ADMIN — FUROSEMIDE 40 MG: 40 TABLET ORAL at 08:35

## 2021-02-20 NOTE — PROGRESS NOTES
Hospitalist Progress Note    NAME: Krystina Luu   :  1970   MRN:  [id number]       Assessment / Plan:  Acute hypercarbic respiratory failure due to  COVID-19 pneumonia  COPD exacerbation  Obesity hypoventilation syndrome  Restrictive lung disease due to super morbid obesity  -Rapid SARS-CoV-2 is positive  -On arrival to ED at [facility], PCT was 79 and improving to 68.  pH is also improving.  -Was on BiPAP yesterday, currently on 6 L  -Appreciated pulmonology consultation  -Continue remdesivir  -Continue Decadron  -Continue azithromycin  -Continue Lasix  -Continue oxygen supplementation  -BiPAP as needed  -Patient looks comfortable today-  -Monitor inflammatory markers daily     Diabetes mellitus type 2  -Continue home Lantus at 35 units. Continue insulin sliding scale with blood sugar checks as well  -Hold home glyburide     Super morbid obesity  Hypertension  Dyslipidemia  Diastolic congestive heart failure not in exacerbation  Coronary artery disease  Obesity hypoventilation syndrome  History of breast cancer with L2 sclerotic lesion on CT scan in 2019.  -Continue Lasix, metoprolol, statin, aspirin, home inhalers  -Consider repeating a CT of spine given L2 sclerotic lesion since I do not see any follow-up study. Informed her to please follow-up with primary care physician given her history of breast cancer as well.        Code Status: Full code  Surrogate Decision Maker: Son Sylvia Adrian     DVT Prophylaxis: Lovenox        Baseline: From home       Subjective:     Chief Complaint / Reason for Physician Visit  \" Patient reports some improvement in shortness of breath, she is currently off the BiPAP, currently on 6 L oxygen. Dry cough is improving\". Discussed with RN events overnight.      Review of Systems:  Symptom Y/N Comments  Symptom Y/N Comments   Fever/Chills n   Chest Pain n    Poor Appetite n   Edema y  chronic lymphedema   Cough y   Abdominal Pain     Sputum n   Joint Pain     SOB/RAZO y Pruritis/Rash     Nausea/vomit n   Tolerating PT/OT     Diarrhea n   Tolerating Diet     Constipation n   Other       Could NOT obtain due to:      Objective:     VITALS:   Last 24hrs VS reviewed since prior progress note. Most recent are:  Patient Vitals for the past 24 hrs:   Temp Pulse Resp BP SpO2   02/20/21 0856     93 %   02/20/21 0751  67      02/20/21 0733 98.2 °F (36.8 °C) 72 18 115/83 96 %   02/20/21 0501     97 %   02/20/21 0337 98.1 °F (36.7 °C) 67  111/72 97 %   02/20/21 0228  73      02/20/21 0153     93 %   02/19/21 2238 97.8 °F (36.6 °C) 82 18 113/69 93 %   02/19/21 2006     94 %   02/19/21 2004     94 %   02/19/21 1951 98 °F (36.7 °C) 74 15 121/81 94 %   02/19/21 1722 98.3 °F (36.8 °C) 74 18 130/73 96 %   02/19/21 1459     97 %   02/19/21 1138 97.8 °F (36.6 °C) 83 16 114/79 98 %   02/19/21 1047     96 %       Intake/Output Summary (Last 24 hours) at 2/20/2021 1028  Last data filed at 2/20/2021 0733  Gross per 24 hour   Intake 970 ml   Output 1300 ml   Net -330 ml        PHYSICAL EXAM:  General: WD, WN. Alert, cooperative, no acute distress. Patient is morbidly obese  EENT:  EOMI. Anicteric sclerae. MMM  Resp:  Poor air entry in all the lung fields, no wheezing or rales. No accessory muscle use  CV:  Regular  rhythm, chronic lymphedema  GI:  Soft, Non distended, Non tender.  +Bowel sounds  Neurologic:  Alert and oriented X 3, normal speech,   Psych:   Good insight. Not anxious nor agitated  Skin:  No rashes.   No jaundice    Reviewed most current lab test results and cultures  YES  Reviewed most current radiology test results   YES  Review and summation of old records today    NO  Reviewed patient's current orders and MAR    YES  PMH/SH reviewed - no change compared to H&P  ________________________________________________________________________  Care Plan discussed with:    Comments   Patient x    Family      RN x    Care Manager     Consultant Multidiciplinary team rounds were held today with , nursing, pharmacist and clinical coordinator. Patient's plan of care was discussed; medications were reviewed and discharge planning was addressed. ________________________________________________________________________  Total NON critical care TIME:  30   Minutes    Total CRITICAL CARE TIME Spent:   Minutes non procedure based      Comments   >50% of visit spent in counseling and coordination of care     ________________________________________________________________________  Chidi Floyd MD     Procedures: see electronic medical records for all procedures/Xrays and details which were not copied into this note but were reviewed prior to creation of Plan. LABS:  I reviewed today's most current labs and imaging studies.   Pertinent labs include:  Recent Labs     02/20/21  0527 02/19/21  0341 02/19/21  0021   WBC 6.8 7.6 9.9   HGB 12.8 13.9 13.2   HCT 42.8 47.7* 44.8   * 94* 95*     Recent Labs     02/20/21  0217 02/19/21  0341 02/19/21  0021    134* 137   K 4.6 4.9 4.8    98 100   CO2 33* 31 32   * 241* 246*   BUN 29* 29* 26*   CREA 0.92 1.26* 1.12*   CA 10.4* 10.9* 10.6*   ALB 3.0* 3.4* 3.3*   TBILI 0.4 0.4 0.4   ALT 31 34 34   INR  --  1.0  --        Signed: Chidi Floyd MD

## 2021-02-20 NOTE — CONSULTS
Pulmonary, Critical Care, and Sleep Medicine    Patient Consult    Name: Holly Dutta MRN: [id number]   : 1970 Hospital: [facility]   Date: 2021        IMPRESSION:   · Acute/chronic hypoxic/hypercapnic respiratory failure  · COVID 19 pneumonia  · GEN/OHS, decompensated - patient of Dr. Melyssa Plummer of [facility] sleep  · COPD/asthma - no prior PFTs, does not follow with us, not clearly exacerbated, no wheezing, appear comfortable with her breathing this am.   · DM-blood sugars are reasonable. · CAD  · HTN  · Morbid obesity      RECOMMENDATIONS:   · Continue NIV as needed and while sleeping. · Wean oxygen as tolerated. · Dexamethasone x 10 days  · Remdesivir x 5 days  · Bronchodilators   · Empiric antibiotics   · Monitor inflammatory markers  · Insulin  · DVT prophylaxis  · Very high risk for further decompensation     Subjective:   2-20: Patient seen this am. No chest pain, no back pain, Tolerating niv well last night. Has mild coughing, congestion. No leg pain, no headaches. Good appetite. This patient has been seen and evaluated at the request of Dr. Shasta Verdin for respiratory failure. Patient is a 52 y.o. female with GEN/OHS with frequent admissions, presented with >1 week history of increased dyspnea with associated cough. She was found to be severely hypoxic and COVID testing was positive. She is now on BiPAP with improved SpO2. She reports a history of COPD, though she has never been formally tested for that.        Past Medical History:   Diagnosis Date    Arthritis     Asthma     Breast lump     CAD (coronary artery disease)     Cancer (HCC)     breast cancer    Congestive heart failure (HCC)     COPD (chronic obstructive pulmonary disease) (HCC)     [facility]     Hypertension     Morbid obesity with BMI of 70 and over, adult ([facility]     NSTEMI (non-ST elevated myocardial infarction) ([facility]     NSTEMI (non-ST elevated myocardial infarction) ([facility] [address] 2012    SVT (supraventricular tachycardia) (HCC)       Past Surgical History:   Procedure Laterality Date    HX  SECTION      HX ORTHOPAEDIC      HX OTHER SURGICAL      cyst removed from back    NH CARDIAC SURG PROCEDURE UNLIST      Stents      Prior to Admission medications    Medication Sig Start Date End Date Taking? Authorizing Provider   spironolactone (ALDACTONE) 25 mg tablet Take 1 Tab by mouth daily. 20   Chepe Serrano MD   cyclobenzaprine (FLEXERIL) 5 mg tablet Take 5 mg by mouth two (2) times a day. Provider, Historical   fluticasone-umeclidinium-vilanterol (Trelegy Ellipta) 100-62.5-25 mcg inhaler Take 1 Puff by inhalation daily. Provider, Historical   NovoLOG Flexpen U-100 Insulin 100 unit/mL (3 mL) inpn 5-10 Units by SubCUTAneous route as directed. Sliding scale: -300: 5 units -400 units: 10 units 20   Provider, Historical   ammonium lactate (LAC-HYDRIN) 12 % lotion Apply 1 Applicator to affected area as needed. rub in to affected area well    Provider, Historical   omeprazole (PRILOSEC) 40 mg capsule Take 40 mg by mouth daily. Provider, Historical   nystatin (MYCOSTATIN) topical cream Apply  to affected area two (2) times a day. 19   Jaylene FERREIRA MD   albuterol-ipratropium (DUO-NEB) 2.5 mg-0.5 mg/3 ml nebu 3 mL by Nebulization route four (4) times daily. 3/3/19   Carli Browning MD   furosemide (LASIX) 40 mg tablet Take 40 mg by mouth two (2) times a day. Other, MD Landon   fluticasone (FLONASE) 50 mcg/actuation nasal spray 2 Sprays by Both Nostrils route daily. 19   Holly Lynch MD   insulin glargine (LANTUS) 100 unit/mL injection 35 Units by SubCUTAneous route daily. Provider, Historical   metoprolol tartrate (LOPRESSOR) 25 mg tablet Take 25 mg by mouth two (2) times a day. Provider, Historical   loratadine (CLARITIN) 10 mg tablet Take 10 mg by mouth daily.     Provider, Historical   albuterol (VENTOLIN HFA) 90 mcg/actuation inhaler Take 2 Puffs by inhalation every six (6) hours as needed for Wheezing. Provider, Historical   aspirin 81 mg chewable tablet Take 81 mg by mouth daily. Provider, Historical   lovastatin (MEVACOR) 40 mg tablet Take 1 Tab by mouth nightly. 1/14/16   Duke Charles NP   glyBURIDE (DIABETA) 5 mg tablet Take 5 mg by mouth two (2) times daily (with meals). Provider, Historical   nitroglycerin (NITROSTAT) 0.4 mg SL tablet 1 Tab by SubLINGual route every five (5) minutes as needed for Chest Pain (call 911 if not relieved by 3).  9/12/13   Duke Charles NP     No Known Allergies   Social History     Tobacco Use    Smoking status: Former Smoker     Packs/day: 0.25     Types: Cigarettes    Smokeless tobacco: Never Used    Tobacco comment: Patient states \"I  aint smoking no more d*mn cigarettes after yesterday\" 01/26/2018   Substance Use Topics    Alcohol use: No      Family History   Problem Relation Age of Onset    Heart Disease Mother     Heart Disease Father     Heart Disease Sister     Breast Cancer Maternal Grandmother     Breast Cancer Paternal Grandmother         Current Facility-Administered Medications   Medication Dose Route Frequency    loratadine (CLARITIN) tablet 10 mg  10 mg Oral DAILY    enoxaparin (LOVENOX) injection 40 mg  40 mg SubCUTAneous Q12H    remdesivir 100 mg in 0.9% sodium chloride 250 mL IVPB  100 mg IntraVENous Q24H    azithromycin (ZITHROMAX) 500 mg in 0.9% sodium chloride 250 mL (VIAL-MATE)  500 mg IntraVENous Q24H    ammonium lactate (LAC-HYDRIN) 12 % lotion   Topical BID    albuterol (PROVENTIL HFA, VENTOLIN HFA, PROAIR HFA) inhaler 2 Puff  2 Puff Inhalation Q6H RT    aspirin chewable tablet 81 mg  81 mg Oral DAILY    fluticasone propionate (FLONASE) 50 mcg/actuation nasal spray 2 Spray  2 Spray Both Nostrils DAILY    furosemide (LASIX) tablet 40 mg  40 mg Oral BID    insulin glargine (LANTUS) injection 35 Units  35 Units SubCUTAneous DAILY    atorvastatin (LIPITOR) tablet 10 mg  10 mg Oral DAILY    metoprolol tartrate (LOPRESSOR) tablet 25 mg  25 mg Oral BID    pantoprazole (PROTONIX) tablet 40 mg  40 mg Oral ACB    spironolactone (ALDACTONE) tablet 25 mg  25 mg Oral DAILY    dexamethasone (DECADRON) 4 mg/mL injection 6 mg  6 mg IntraVENous Q24H    insulin lispro (HUMALOG) injection   SubCUTAneous AC&HS    cyclobenzaprine (FLEXERIL) tablet 5 mg  5 mg Oral BID       Review of Systems:  A comprehensive review of systems was negative except for that written in the HPI. Objective:   Vital Signs:    Visit Vitals  /83 (BP 1 Location: Left lower arm, BP Patient Position: At rest)   Pulse 67   Temp 98.2 °F (36.8 °C)   Resp 18   Ht 5' 7\" (1.702 m)   Wt (!) 171.3 kg (377 lb 10.4 oz)   SpO2 93%   BMI 59.15 kg/m²       O2 Device: Hi flow nasal cannula   O2 Flow Rate (L/min): 7 l/min   Temp (24hrs), Av °F (36.7 °C), Min:97.8 °F (36.6 °C), Max:98.3 °F (36.8 °C)       Intake/Output:   Last shift:       07 -  1900  In: 240 [P.O.:240]  Out: 700 [Urine:700]  Last 3 shifts:  190 -  0700  In: 1380 [P.O.:880; I.V.:500]  Out: 900 [Urine:900]    Intake/Output Summary (Last 24 hours) at 2021 0917  Last data filed at 2021 8739  Gross per 24 hour   Intake 1620 ml   Output 1300 ml   Net 320 ml      Physical Exam:   General:  Morbidly obese, sleeping, on 7L nc with pox of 93%. Head:  Normocephalic, without obvious abnormality, atraumatic. Eyes:  Conjunctivae/corneas clear. PERRL, EOMs intact. Nose: Nares normal. Septum midline. Mucosa normal.     Throat: Lips, mucosa, and tongue normal.    Neck: Supple, symmetrical, trachea midline    Back:   Symmetric, no curvature. ROM normal.   Lungs:   Distant bilateral breath sounds, clear, breathing comfortably. Chest wall:  No tenderness or deformity. Heart:  Regular rate and rhythm    Abdomen:   Soft, non-tender. Bowel sounds normal.  Obese.     Extremities: Extremities normal, atraumatic, no cyanosis or clubbing   Skin: Skin color, texture, turgor normal. Significant hirsutism. Lymph nodes: Cervical, supraclavicular nodes normal.   Neurologic: Grossly nonfocal, motor is intact. Data review:     Recent Results (from the past 24 hour(s))   GLUCOSE, POC    Collection Time: 02/19/21 12:08 PM   Result Value Ref Range    Glucose (POC) 220 (H) 65 - 100 mg/dL    Performed by Juli Barksdale PCT    GLUCOSE, POC    Collection Time: 02/19/21  4:57 PM   Result Value Ref Range    Glucose (POC) 126 (H) 65 - 100 mg/dL    Performed by Juli Barksdale PCT    GLUCOSE, POC    Collection Time: 02/19/21  9:02 PM   Result Value Ref Range    Glucose (POC) 174 (H) 65 - 100 mg/dL    Performed by Marquise Barrett RN    METABOLIC PANEL, COMPREHENSIVE    Collection Time: 02/20/21  2:17 AM   Result Value Ref Range    Sodium 136 136 - 145 mmol/L    Potassium 4.6 3.5 - 5.1 mmol/L    Chloride 100 97 - 108 mmol/L    CO2 33 (H) 21 - 32 mmol/L    Anion gap 3 (L) 5 - 15 mmol/L    Glucose 178 (H) 65 - 100 mg/dL    BUN 29 (H) 6 - 20 MG/DL    Creatinine 0.92 0.55 - 1.02 MG/DL    BUN/Creatinine ratio 32 (H) 12 - 20      GFR est AA >60 >60 ml/min/1.73m2    GFR est non-AA >60 >60 ml/min/1.73m2    Calcium 10.4 (H) 8.5 - 10.1 MG/DL    Bilirubin, total 0.4 0.2 - 1.0 MG/DL    ALT (SGPT) 31 12 - 78 U/L    AST (SGOT) 24 15 - 37 U/L    Alk.  phosphatase 89 45 - 117 U/L    Protein, total 7.7 6.4 - 8.2 g/dL    Albumin 3.0 (L) 3.5 - 5.0 g/dL    Globulin 4.7 (H) 2.0 - 4.0 g/dL    A-G Ratio 0.6 (L) 1.1 - 2.2     D DIMER    Collection Time: 02/20/21  2:17 AM   Result Value Ref Range    D-dimer 0.44 0.00 - 0.65 mg/L FEU   CBC WITH AUTOMATED DIFF    Collection Time: 02/20/21  5:27 AM   Result Value Ref Range    WBC 6.8 3.6 - 11.0 K/uL    RBC 4.61 3.80 - 5.20 M/uL    HGB 12.8 11.5 - 16.0 g/dL    HCT 42.8 35.0 - 47.0 %    MCV 92.8 80.0 - 99.0 FL    MCH 27.8 26.0 - 34.0 PG    MCHC 29.9 (L) 30.0 - 36.5 g/dL    RDW 13.1 11.5 - 14.5 %    PLATELET 443 (L) 150 - 400 K/uL    MPV 11.5 8.9 - 12.9 FL    NRBC 0.0 0  WBC    ABSOLUTE NRBC 0.00 0.00 - 0.01 K/uL    NEUTROPHILS 82 (H) 32 - 75 %    LYMPHOCYTES 8 (L) 12 - 49 %    MONOCYTES 10 5 - 13 %    EOSINOPHILS 0 0 - 7 %    BASOPHILS 0 0 - 1 %    IMMATURE GRANULOCYTES 0 0.0 - 0.5 %    ABS. NEUTROPHILS 5.6 1.8 - 8.0 K/UL    ABS. LYMPHOCYTES 0.5 (L) 0.8 - 3.5 K/UL    ABS. MONOCYTES 0.7 0.0 - 1.0 K/UL    ABS. EOSINOPHILS 0.0 0.0 - 0.4 K/UL    ABS. BASOPHILS 0.0 0.0 - 0.1 K/UL    ABS. IMM. GRANS. 0.0 0.00 - 0.04 K/UL    DF AUTOMATED     GLUCOSE, POC    Collection Time: 02/20/21  7:51 AM   Result Value Ref Range    Glucose (POC) 169 (H) 65 - 100 mg/dL    Performed by Rhona Cabrera PCT        Imaging:  I have personally reviewed the patients radiographs and have reviewed the reports:  2-18-21: IMPRESSION  1. Enlarged cardiac silhouette.  Prominence of the right hilum is felt to be  technical. When the patient can tolerate recommend PA and lateral with good  inspiration otherwise no acute abnormality, I agree with radiology interpretation         Gómze Toth MD

## 2021-02-20 NOTE — PROGRESS NOTES
Problem: Falls - Risk of  Goal: *Absence of Falls  Description: Document Cucohisue Eliseo Fall Risk and appropriate interventions in the flowsheet.   Outcome: Progressing Towards Goal  Note: Fall Risk Interventions:            Medication Interventions: Bed/chair exit alarm, Patient to call before getting OOB, Teach patient to arise slowly    Elimination Interventions: Call light in reach, Patient to call for help with toileting needs, Toileting schedule/hourly rounds

## 2021-02-20 NOTE — PROGRESS NOTES
Bedside and Verbal shift change report given to fuad (oncoming nurse) by Opal Shankar (offgoing nurse). Report included the following information SBAR, Kardex, Intake/Output, MAR, Recent Results and Cardiac Rhythm nsr/st.    2300: pt on 8L HFNC, o2 sats mid 90s, pt declined bipap and wanted to wait, will cont to monitor O2    0000: pt still declining bipap O2 sats in 95% on 8L HFNC no s/sx of resp distress, no inc work of breathing. Will monitor      0130: pt placed on bipap     End of Shift Note    Bedside shift change report given to braydon (oncoming nurse) by Smita Clemens (offgoing nurse). Report included the following information SBAR, Kardex, Intake/Output, MAR, Recent Results and Cardiac Rhythm nsr/st    Shift worked:  night     Shift summary and any significant changes: On bipap @ 55% @ HS , 8LHFNC during the day @ shift change pt taken to [address] [place] [address] put on 15LHFNC recovered quickly,      Concerns for physician to address:  oxygen, poor venous access      Zone phone for oncoming shift:  [phone]     Activity:  Activity Level: Up with Assistance  Number times ambulated in hallways past shift: 0  Number of times OOB to chair past shift: 0  Up to [facility] no issues   Cardiac:   Cardiac Monitoring: YES    Cardiac Rhythm: Normal sinus rhythm    Access:   Current line(s): PIV     Genitourinary:   Urinary status: voiding    Respiratory:   O2 Device: Hi flow nasal cannula  Chronic home O2 use?: YES  Incentive spirometer at bedside: NO     GI:  Last Bowel Movement Date: 02/18/21  Current diet:  DIET DIABETIC WITH OPTIONS Consistent Carb 1800kcal; Regular  Passing flatus: YES  Tolerating current diet: YES  % Diet Eaten: 100 %    Pain Management:   Patient states pain is manageable on current regimen: YES    Skin:  Jeffrey Score: 19  Interventions: float heels and increase time out of bed    Patient Safety:  Fall Score:  Total Score: 2  Interventions: gripper socks, pt to call before getting OOB and stay with me (per policy) Length of Stay:  Expected LOS: 5d 12h  [facility]

## 2021-02-20 NOTE — PROGRESS NOTES
0700: Bedside shift change report given to Eve Webber, RN and Guy Shell RN (oncoming nurse) by Humaira Marin RN (offgoing nurse). Report included the following information SBAR, Kardex, Intake/Output, MAR and Recent Results. 0700: Patient in bed, call bell in reach. Patient's O2 saturation 96% on 15L, patient weaned to 10L HFNC, O2 saturation 92%. 0845: Patient O2 saturation 97% on 10L HFNC, Patient currently on 7L HFNC, O2 saturation 93%. 5569: Dr. Almond Siemens at bedside assessing patient. Will continue current treatment. Also, verbal orders given for Claritin 10 mg daily. 1900: End of Shift Note    Bedside shift change report given to oncdhruv RN (oncoming nurse) by Staci Perez (offgoing nurse). Report included the following information SBAR, Kardex, Intake/Output, MAR and Recent Results    Shift worked:  8674-1861     Shift summary and any significant changes:     Patient weaned to 7L HFNC, continue to wean O2 as tolerated     Concerns for physician to address:  Wean O2 back to baseline. Zone phone for oncoming shift:   [phone]       Activity:  Activity Level: Up with Assistance  Number times ambulated in hallways past shift: 0  Number of times OOB to chair past shift: 2    Cardiac:   Cardiac Monitoring: Yes      Cardiac Rhythm: Normal sinus rhythm    Access:   Current line(s): PIV     Genitourinary:   Urinary status: voiding    Respiratory:   O2 Device: Hi flow nasal cannula  Chronic home O2 use?: YES  Incentive spirometer at bedside: YES     GI:  Last Bowel Movement Date: 02/18/21  Current diet:  DIET DIABETIC WITH OPTIONS Consistent Carb 1800kcal; Regular  Passing flatus: YES  Tolerating current diet: YES  % Diet Eaten: 100 %    Pain Management:   Patient states pain is manageable on current regimen: YES    Skin:  Jeffrey Score: 19  Interventions: float heels, increase time out of bed, foam dressing and PT/OT consult    Patient Safety:  Fall Score:  Total Score: 2  Interventions: bed/chair alarm, gripper socks, pt to call before getting OOB and stay with me (per policy)       Length of Stay:  Expected LOS: 5d 12h  Actual LOS: [address]

## 2021-02-21 LAB
ALBUMIN SERPL-MCNC: 3.1 G/DL (ref 3.5–5)
ALBUMIN/GLOB SERPL: 0.7 {RATIO} (ref 1.1–2.2)
ALP SERPL-CCNC: 89 U/L (ref 45–117)
ALT SERPL-CCNC: 36 U/L (ref 12–78)
ANION GAP SERPL CALC-SCNC: 2 MMOL/L (ref 5–15)
AST SERPL-CCNC: 31 U/L (ref 15–37)
BILIRUB SERPL-MCNC: 0.3 MG/DL (ref 0.2–1)
BUN SERPL-MCNC: 30 MG/DL (ref 6–20)
BUN/CREAT SERPL: 29 (ref 12–20)
CALCIUM SERPL-MCNC: 10.2 MG/DL (ref 8.5–10.1)
CHLORIDE SERPL-SCNC: 100 MMOL/L (ref 97–108)
CO2 SERPL-SCNC: 33 MMOL/L (ref 21–32)
CREAT SERPL-MCNC: 1.03 MG/DL (ref 0.55–1.02)
CRP SERPL-MCNC: 1.28 MG/DL (ref 0–0.6)
ERYTHROCYTE [DISTWIDTH] IN BLOOD BY AUTOMATED COUNT: 13.3 % (ref 11.5–14.5)
GLOBULIN SER CALC-MCNC: 4.6 G/DL (ref 2–4)
GLUCOSE BLD STRIP.AUTO-MCNC: 143 MG/DL (ref 65–100)
GLUCOSE BLD STRIP.AUTO-MCNC: 160 MG/DL (ref 65–100)
GLUCOSE BLD STRIP.AUTO-MCNC: 195 MG/DL (ref 65–100)
GLUCOSE BLD STRIP.AUTO-MCNC: 200 MG/DL (ref 65–100)
GLUCOSE SERPL-MCNC: 200 MG/DL (ref 65–100)
HCT VFR BLD AUTO: 42.2 % (ref 35–47)
HGB BLD-MCNC: 12.6 G/DL (ref 11.5–16)
MCH RBC QN AUTO: 27.7 PG (ref 26–34)
MCHC RBC AUTO-ENTMCNC: 29.9 G/DL (ref 30–36.5)
MCV RBC AUTO: 92.7 FL (ref 80–99)
NRBC # BLD: 0 K/UL (ref 0–0.01)
NRBC BLD-RTO: 0 PER 100 WBC
PLATELET # BLD AUTO: 117 K/UL (ref 150–400)
PMV BLD AUTO: 11.3 FL (ref 8.9–12.9)
POTASSIUM SERPL-SCNC: 4.5 MMOL/L (ref 3.5–5.1)
PROT SERPL-MCNC: 7.7 G/DL (ref 6.4–8.2)
RBC # BLD AUTO: 4.55 M/UL (ref 3.8–5.2)
SERVICE CMNT-IMP: ABNORMAL
SODIUM SERPL-SCNC: 135 MMOL/L (ref 136–145)
WBC # BLD AUTO: 5.4 K/UL (ref 3.6–11)

## 2021-02-21 PROCEDURE — 74011250636 HC RX REV CODE- 250/636: Performed by: INTERNAL MEDICINE

## 2021-02-21 PROCEDURE — 74011000250 HC RX REV CODE- 250: Performed by: INTERNAL MEDICINE

## 2021-02-21 PROCEDURE — 74011000258 HC RX REV CODE- 258: Performed by: INTERNAL MEDICINE

## 2021-02-21 PROCEDURE — 77010033711 HC HIGH FLOW OXYGEN

## 2021-02-21 PROCEDURE — 74011250637 HC RX REV CODE- 250/637: Performed by: NURSE PRACTITIONER

## 2021-02-21 PROCEDURE — 74011250637 HC RX REV CODE- 250/637: Performed by: INTERNAL MEDICINE

## 2021-02-21 PROCEDURE — 36415 COLL VENOUS BLD VENIPUNCTURE: CPT

## 2021-02-21 PROCEDURE — 82962 GLUCOSE BLOOD TEST: CPT

## 2021-02-21 PROCEDURE — 94660 CPAP INITIATION&MGMT: CPT

## 2021-02-21 PROCEDURE — 94640 AIRWAY INHALATION TREATMENT: CPT

## 2021-02-21 PROCEDURE — 74011636637 HC RX REV CODE- 636/637: Performed by: INTERNAL MEDICINE

## 2021-02-21 PROCEDURE — 86140 C-REACTIVE PROTEIN: CPT

## 2021-02-21 PROCEDURE — 80053 COMPREHEN METABOLIC PANEL: CPT

## 2021-02-21 PROCEDURE — 85027 COMPLETE CBC AUTOMATED: CPT

## 2021-02-21 PROCEDURE — 77010033678 HC OXYGEN DAILY

## 2021-02-21 PROCEDURE — 65660000000 HC RM CCU STEPDOWN

## 2021-02-21 RX ORDER — CYCLOBENZAPRINE HCL 10 MG
5 TABLET ORAL
Status: DISCONTINUED | OUTPATIENT
Start: 2021-02-21 | End: 2021-02-24 | Stop reason: HOSPADM

## 2021-02-21 RX ORDER — ALBUTEROL SULFATE 90 UG/1
2 AEROSOL, METERED RESPIRATORY (INHALATION)
Status: DISCONTINUED | OUTPATIENT
Start: 2021-02-21 | End: 2021-02-24 | Stop reason: HOSPADM

## 2021-02-21 RX ADMIN — INSULIN LISPRO 3 UNITS: 100 INJECTION, SOLUTION INTRAVENOUS; SUBCUTANEOUS at 17:02

## 2021-02-21 RX ADMIN — ALBUTEROL SULFATE 2 PUFF: 90 AEROSOL, METERED RESPIRATORY (INHALATION) at 01:17

## 2021-02-21 RX ADMIN — CYCLOBENZAPRINE 5 MG: 10 TABLET, FILM COATED ORAL at 08:00

## 2021-02-21 RX ADMIN — ACETAMINOPHEN 650 MG: 325 TABLET ORAL at 17:05

## 2021-02-21 RX ADMIN — FLUTICASONE PROPIONATE 2 SPRAY: 50 SPRAY, METERED NASAL at 08:02

## 2021-02-21 RX ADMIN — ATORVASTATIN CALCIUM 10 MG: 10 TABLET, FILM COATED ORAL at 08:01

## 2021-02-21 RX ADMIN — ALBUTEROL SULFATE 2 PUFF: 90 AEROSOL, METERED RESPIRATORY (INHALATION) at 19:23

## 2021-02-21 RX ADMIN — REMDESIVIR 100 MG: 100 INJECTION, POWDER, LYOPHILIZED, FOR SOLUTION INTRAVENOUS at 10:14

## 2021-02-21 RX ADMIN — AZITHROMYCIN MONOHYDRATE 500 MG: 500 INJECTION, POWDER, LYOPHILIZED, FOR SOLUTION INTRAVENOUS at 11:52

## 2021-02-21 RX ADMIN — ASPIRIN 81 MG: 81 TABLET, CHEWABLE ORAL at 08:00

## 2021-02-21 RX ADMIN — INSULIN GLARGINE 35 UNITS: 100 INJECTION, SOLUTION SUBCUTANEOUS at 08:00

## 2021-02-21 RX ADMIN — FUROSEMIDE 40 MG: 40 TABLET ORAL at 08:01

## 2021-02-21 RX ADMIN — PANTOPRAZOLE SODIUM 40 MG: 40 TABLET, DELAYED RELEASE ORAL at 08:00

## 2021-02-21 RX ADMIN — FUROSEMIDE 40 MG: 40 TABLET ORAL at 17:02

## 2021-02-21 RX ADMIN — METOPROLOL TARTRATE 25 MG: 25 TABLET, FILM COATED ORAL at 17:02

## 2021-02-21 RX ADMIN — INSULIN LISPRO 2 UNITS: 100 INJECTION, SOLUTION INTRAVENOUS; SUBCUTANEOUS at 07:58

## 2021-02-21 RX ADMIN — SPIRONOLACTONE 25 MG: 25 TABLET ORAL at 08:01

## 2021-02-21 RX ADMIN — ENOXAPARIN SODIUM 40 MG: 40 INJECTION SUBCUTANEOUS at 08:00

## 2021-02-21 RX ADMIN — DEXAMETHASONE SODIUM PHOSPHATE 6 MG: 4 INJECTION, SOLUTION INTRA-ARTICULAR; INTRALESIONAL; INTRAMUSCULAR; INTRAVENOUS; SOFT TISSUE at 23:02

## 2021-02-21 RX ADMIN — LORATADINE 10 MG: 10 TABLET ORAL at 08:01

## 2021-02-21 RX ADMIN — ENOXAPARIN SODIUM 40 MG: 40 INJECTION SUBCUTANEOUS at 23:02

## 2021-02-21 RX ADMIN — INSULIN LISPRO 2 UNITS: 100 INJECTION, SOLUTION INTRAVENOUS; SUBCUTANEOUS at 11:52

## 2021-02-21 RX ADMIN — ALBUTEROL SULFATE 2 PUFF: 90 AEROSOL, METERED RESPIRATORY (INHALATION) at 08:00

## 2021-02-21 NOTE — PROGRESS NOTES
0700: Bedside shift change report given to Ryan Sethi RN (oncoming nurse) by Marqiuse Guillen RN (offgoing nurse). Report included the following information SBAR, Kardex, Intake/Output, MAR and Recent Results. 0700: Miguel Srinivasan student will be documenting on patient. 1900: I have reviewed and agree with miguel Srinivasan student documentation. 1900: End of Shift Note    Bedside shift change report given to Marquise Guillen RN (oncoming nurse) by Urvashi Shi (offgoing nurse). Report included the following information SBAR, Kardex, Intake/Output, MAR and Recent Results    Shift worked:  [id number]     Shift summary and any significant changes:     Patient back to baseline O2, %LNC, still wear BiPAP at night. Concerns for physician to address:  Discharge plan? [facility]   [phone]       Activity:  Activity Level: Up with Assistance  Number times ambulated in hallways past shift: 0  Number of times OOB to chair past shift: 1    Cardiac:   Cardiac Monitoring: Yes      Cardiac Rhythm: Normal sinus rhythm    Access:   Current line(s): PIV     Genitourinary:   Urinary status: voiding    Respiratory:   O2 Device: Nasal cannula  Chronic home O2 use?: YES  Incentive spirometer at bedside: YES     GI:  Last Bowel Movement Date: 02/18/21  Current diet:  DIET DIABETIC WITH OPTIONS Consistent Carb 1800kcal; Regular  DIET ONE TIME MESSAGE  Passing flatus: YES  Tolerating current diet: YES  % Diet Eaten: 100 %    Pain Management:   Patient states pain is manageable on current regimen: YES    Skin:  Jeffrey Score: 19  Interventions: increase time out of bed, PT/OT consult and nutritional support     Patient Safety:  Fall Score:  Total Score: 2  Interventions: bed/chair alarm, pt to call before getting OOB and stay with me (per policy)       Length of Stay:  Expected LOS: 5d 12h  Actual LOS: [address]

## 2021-02-21 NOTE — PROGRESS NOTES
Pulmonary, Critical Care, and Sleep Medicine    Patient Consult    Name: Connie Cheadle MRN: [id number]   : 1970 Hospital: [facility]   Date: 2021        IMPRESSION:   · Acute/chronic hypoxic/hypercapnic respiratory failure-seems to be stable over last 24 hrs. Uses bipap at night and oxygen during day. · COVID 19 pneumonia  · GEN/OHS, decompensated - patient of Dr. Sandoval Crooks of [facility] sleep  · COPD/asthma - no prior PFTs, does not follow with us, not clearly exacerbated, no wheezing, appear comfortable with her breathing this am.   · DM-blood sugars are reasonable. · CAD  · Chronic back pain. · HTN  · Morbid obesity  · Reviewed nursing other providers notes. Reviewed labs, CXR, orders and meds this am.       RECOMMENDATIONS:   · Continue NIV as needed and while sleeping. · Wean oxygen as tolerated. Wean to keep pox over 90% on 5L this am.   · Dexamethasone x 10 days  · Remdesivir x 5 days  · Bronchodilators   · Empiric antibiotics   · Back pain control  · May need Bariatric bed. · Monitor inflammatory markers  · Insulin  · DVT prophylaxis     Subjective:   21: Pt was up at bedside this am. Eating her breakfast. She noted some acute on chronic back pain. She used the bipap last pm.   On NC oxygen this am when seen. No headaches, no GERD. NO sinus pain or pressure. Notes occasional spasms of back, ON flexeril. No acute issues per nursing when seen this am.     20: Patient seen this am. No chest pain, no back pain, Tolerating niv well last night. Has mild coughing, congestion. No leg pain, no headaches. Good appetite. This patient has been seen and evaluated at the request of Dr. Harry Traore for respiratory failure. Patient is a 52 y.o. female with GEN/OHS with frequent admissions, presented with >1 week history of increased dyspnea with associated cough. She was found to be severely hypoxic and COVID testing was positive.   She is now on BiPAP with improved SpO2.  She reports a history of COPD, though she has never been formally tested for that. Past Medical History:   Diagnosis Date    Arthritis     Asthma     Breast lump     CAD (coronary artery disease)     Cancer (HCC)     breast cancer    Congestive heart failure (HCC)     COPD [facility]chronic obstructive pulmonary disease) ([facility]     Diabetes ([facility]     Hypertension     Morbid obesity with BMI of 70 and over, adult [facility]     NSTEMI (non-ST elevated myocardial infarction) ([facility]     NSTEMI (non-ST elevated myocardial infarction) ([facility] 2012    SVT (supraventricular tachycardia) ([facility])       Past Surgical History:   Procedure Laterality Date    HX  SECTION      HX ORTHOPAEDIC      HX OTHER SURGICAL      cyst removed from back    [place] CARDIAC SURG PROCEDURE UNLIST      Stents      Prior to Admission medications    Medication Sig Start Date End Date Taking? Authorizing Provider   spironolactone (ALDACTONE) 25 mg tablet Take 1 Tab by mouth daily. 20   Ruddy Weaver MD   cyclobenzaprine (FLEXERIL) 5 mg tablet Take 5 mg by mouth two (2) times a day. Provider, Historical   fluticasone-umeclidinium-vilanterol (Trelegy Ellipta) 100-62.5-25 mcg inhaler Take 1 Puff by inhalation daily. Provider, Historical   NovoLOG Flexpen U-100 Insulin 100 unit/mL (3 mL) inpn 5-10 Units by SubCUTAneous route as directed. Sliding scale: -300: 5 units -400 units: 10 units 20   Provider, Historical   ammonium lactate (LAC-HYDRIN) 12 % lotion Apply 1 Applicator to affected area as needed. rub in to affected area well    Provider, Historical   omeprazole (PRILOSEC) 40 mg capsule Take 40 mg by mouth daily. Provider, Historical   nystatin (MYCOSTATIN) topical cream Apply  to affected area two (2) times a day. 19   Jasmine FERREIRA MD   albuterol-ipratropium (DUO-NEB) 2.5 mg-0.5 mg/3 ml nebu 3 mL by Nebulization route four (4) times daily.  3/3/19   Valerie Tim MD   furosemide (LASIX) 40 mg tablet Take 40 mg by mouth two (2) times a day. Other, MD Landon   fluticasone (FLONASE) 50 mcg/actuation nasal spray 2 Sprays by Both Nostrils route daily. 1/6/19   Kaveh Perea MD   insulin glargine (LANTUS) 100 unit/mL injection 35 Units by SubCUTAneous route daily. Provider, Historical   metoprolol tartrate (LOPRESSOR) 25 mg tablet Take 25 mg by mouth two (2) times a day. Provider, Historical   loratadine (CLARITIN) 10 mg tablet Take 10 mg by mouth daily. Provider, Historical   albuterol (VENTOLIN HFA) 90 mcg/actuation inhaler Take 2 Puffs by inhalation every six (6) hours as needed for Wheezing. Provider, Historical   aspirin 81 mg chewable tablet Take 81 mg by mouth daily. Provider, Historical   lovastatin (MEVACOR) 40 mg tablet Take 1 Tab by mouth nightly. 1/14/16   Davina Renee NP   glyBURIDE (DIABETA) 5 mg tablet Take 5 mg by mouth two (2) times daily (with meals). Provider, Historical   nitroglycerin (NITROSTAT) 0.4 mg SL tablet 1 Tab by SubLINGual route every five (5) minutes as needed for Chest Pain (call 911 if not relieved by 3).  9/12/13   Davina Renee NP     No Known Allergies   Social History     Tobacco Use    Smoking status: Former Smoker     Packs/day: 0.25     Types: Cigarettes    Smokeless tobacco: Never Used    Tobacco comment: Patient states \"I  aint smoking no more d*mn cigarettes after yesterday\" 01/26/2018   Substance Use Topics    Alcohol use: No      Family History   Problem Relation Age of Onset    Heart Disease Mother     Heart Disease Father     Heart Disease Sister     Breast Cancer Maternal Grandmother     Breast Cancer Paternal Grandmother         Current Facility-Administered Medications   Medication Dose Route Frequency    loratadine (CLARITIN) tablet 10 mg  10 mg Oral DAILY    enoxaparin (LOVENOX) injection 40 mg  40 mg SubCUTAneous Q12H    remdesivir 100 mg in 0.9% sodium chloride 250 mL IVPB  100 mg IntraVENous Q24H    azithromycin (ZITHROMAX) 500 mg in 0.9% sodium chloride 250 mL (VIAL-MATE)  500 mg IntraVENous Q24H    ammonium lactate (LAC-HYDRIN) 12 % lotion   Topical BID    albuterol (PROVENTIL HFA, VENTOLIN HFA, PROAIR HFA) inhaler 2 Puff  2 Puff Inhalation Q6H RT    aspirin chewable tablet 81 mg  81 mg Oral DAILY    fluticasone propionate (FLONASE) 50 mcg/actuation nasal spray 2 Spray  2 Spray Both Nostrils DAILY    furosemide (LASIX) tablet 40 mg  40 mg Oral BID    insulin glargine (LANTUS) injection 35 Units  35 Units SubCUTAneous DAILY    atorvastatin (LIPITOR) tablet 10 mg  10 mg Oral DAILY    metoprolol tartrate (LOPRESSOR) tablet 25 mg  25 mg Oral BID    pantoprazole (PROTONIX) tablet 40 mg  40 mg Oral ACB    spironolactone (ALDACTONE) tablet 25 mg  25 mg Oral DAILY    dexamethasone (DECADRON) 4 mg/mL injection 6 mg  6 mg IntraVENous Q24H    insulin lispro (HUMALOG) injection   SubCUTAneous AC&HS       Review of Systems:  A comprehensive review of systems was negative except for that written in the HPI. Objective:   Vital Signs:    Visit Vitals  /68 (BP 1 Location: Left lower arm, BP Patient Position: At rest)   Pulse 70   Temp 97.6 °F (36.4 °C)   Resp 19   Ht 5' 7\" (1.702 m)   Wt (!) 170.6 kg (376 lb 3.2 oz)   SpO2 97%   BMI 58.92 kg/m²       O2 Device: Nasal cannula   O2 Flow Rate (L/min): 5 l/min   Temp (24hrs), Av.8 °F (36.6 °C), Min:97.4 °F (36.3 °C), Max:98.7 °F (37.1 °C)       Intake/Output:   Last shift:      701 - 1900  In: -   Out: 8318 [VYLAR:5644]  Last 3 shifts: 1901 -  07  In: 2180 [P.O.:1680; I.V.:500]  Out: 2600 [Urine:2600]    Intake/Output Summary (Last 24 hours) at 2021 1057  Last data filed at 2021 0935  Gross per 24 hour   Intake 1940 ml   Output 2400 ml   Net -460 ml      Physical Exam:   General:  Morbidly obese, sleeping, on 7L nc with pox of 93%. Head:  Normocephalic, without obvious abnormality, atraumatic.    Eyes: Conjunctivae/corneas clear. PERRL, EOMs intact. Nose: Nares normal. Septum midline. Mucosa normal.     Throat: Lips, mucosa, and tongue normal.    Neck: Supple, symmetrical, trachea midline    Back:   Symmetric, no curvature. ROM normal.   Lungs:   Distant bilateral breath sounds, clear, breathing comfortably. Chest wall:  No tenderness or deformity. Heart:  Regular rate and rhythm    Abdomen:   Soft, non-tender. Bowel sounds normal.  Obese. Extremities: Extremities normal, atraumatic, no cyanosis or clubbing   Skin: Skin color, texture, turgor normal. Significant hirsutism. Lymph nodes: Cervical, supraclavicular nodes normal.   Neurologic: Grossly nonfocal, motor is intact.       Data review:     Recent Results (from the past 24 hour(s))   GLUCOSE, POC    Collection Time: 02/20/21 11:12 AM   Result Value Ref Range    Glucose (POC) 210 (H) 65 - 100 mg/dL    Performed by Lashawn Golder PCT    GLUCOSE, POC    Collection Time: 02/20/21  4:35 PM   Result Value Ref Range    Glucose (POC) 117 (H) 65 - 100 mg/dL    Performed by Lashawn Golder PCT    GLUCOSE, POC    Collection Time: 02/20/21  9:06 PM   Result Value Ref Range    Glucose (POC) 130 (H) 65 - 100 mg/dL    Performed by Michelle Hansen    CBC W/O DIFF    Collection Time: 02/21/21  3:40 AM   Result Value Ref Range    WBC 5.4 3.6 - 11.0 K/uL    RBC 4.55 3.80 - 5.20 M/uL    HGB 12.6 11.5 - 16.0 g/dL    HCT 42.2 35.0 - 47.0 %    MCV 92.7 80.0 - 99.0 FL    MCH 27.7 26.0 - 34.0 PG    MCHC 29.9 (L) 30.0 - 36.5 g/dL    RDW 13.3 11.5 - 14.5 %    PLATELET 161 (L) 159 - 400 K/uL    MPV 11.3 8.9 - 12.9 FL    NRBC 0.0 0  WBC    ABSOLUTE NRBC 0.00 0.00 - 1.10 K/uL   METABOLIC PANEL, COMPREHENSIVE    Collection Time: 02/21/21  3:40 AM   Result Value Ref Range    Sodium 135 (L) 136 - 145 mmol/L    Potassium 4.5 3.5 - 5.1 mmol/L    Chloride 100 97 - 108 mmol/L    CO2 33 (H) 21 - 32 mmol/L    Anion gap 2 (L) 5 - 15 mmol/L    Glucose 200 (H) 65 - 100 mg/dL    BUN 30 (H) 6 - 20 MG/DL    Creatinine 1.03 (H) 0.55 - 1.02 MG/DL    BUN/Creatinine ratio 29 (H) 12 - 20      GFR est AA >60 >60 ml/min/1.73m2    GFR est non-AA 57 (L) >60 ml/min/1.73m2    Calcium 10.2 (H) 8.5 - 10.1 MG/DL    Bilirubin, total 0.3 0.2 - 1.0 MG/DL    ALT (SGPT) 36 12 - 78 U/L    AST (SGOT) 31 15 - 37 U/L    Alk. phosphatase 89 45 - 117 U/L    Protein, total 7.7 6.4 - 8.2 g/dL    Albumin 3.1 (L) 3.5 - 5.0 g/dL    Globulin 4.6 (H) 2.0 - 4.0 g/dL    A-G Ratio 0.7 (L) 1.1 - 2.2     GLUCOSE, POC    Collection Time: 02/21/21  7:43 AM   Result Value Ref Range    Glucose (POC) 160 (H) 65 - 100 mg/dL    Performed by Gene Rist PCT        Imaging:  I have personally reviewed the patients radiographs and have reviewed the reports:  2-18-21: IMPRESSION  1. Enlarged cardiac silhouette.  Prominence of the right hilum is felt to be  technical. When the patient can tolerate recommend PA and lateral with good  inspiration otherwise no acute abnormality, I agree with radiology interpretation         Refugio Desai MD

## 2021-02-21 NOTE — PROGRESS NOTES
Problem: Falls - Risk of  Goal: *Absence of Falls  Description: Document Reta Santa Fall Risk and appropriate interventions in the flowsheet.   Outcome: Progressing Towards Goal  Note: Fall Risk Interventions:            Medication Interventions: Assess postural VS orthostatic hypotension, Bed/chair exit alarm, Patient to call before getting OOB, Teach patient to arise slowly    Elimination Interventions: Bed/chair exit alarm, Call light in reach, Toileting schedule/hourly rounds, Patient to call for help with toileting needs

## 2021-02-21 NOTE — PROGRESS NOTES
1900- Bedside shift change report given to Tahmina Parnell RN (oncoming nurse) by Jyotsna Vogt RN (offgoing nurse). Report included the following information SBAR, Kardex, ED Summary, Intake/Output, MAR, Recent Results, Med Rec Status and Cardiac Rhythm NSR.     0700- End of Shift Note    Bedside shift change report given to Jyotsna Vogt RN (oncoming nurse) by YONAS Stephenson (offgoing nurse). Report included the following information SBAR, Kardex, ED Summary, Intake/Output, MAR, Recent Results, Med Rec Status and Cardiac Rhythm NSR    Shift worked:  7p-7a     Shift summary and any significant changes:     No changes     Concerns for physician to address:  none     Zone phone for oncoming shift:          Activity:  Activity Level: Up with Assistance  Number times ambulated in hallways past shift: 0  Number of times OOB to chair past shift: 0    Cardiac:   Cardiac Monitoring: Yes      Cardiac Rhythm: Normal sinus rhythm    Access:   Current line(s): PIV     Genitourinary:   Urinary status: voiding    Respiratory:   O2 Device: BIPAP  Chronic home O2 use?: YES  Incentive spirometer at bedside: NO     GI:  Last Bowel Movement Date: 02/18/21  Current diet:  DIET DIABETIC WITH OPTIONS Consistent Carb 1800kcal; Regular  Passing flatus: YES  Tolerating current diet: YES  % Diet Eaten: 100 %    Pain Management:   Patient states pain is manageable on current regimen: YES    Skin:  Jeffrey Score: 19  Interventions: speciality bed, increase time out of bed and PT/OT consult    Patient Safety:  Fall Score:  Total Score: 2  Interventions: bed/chair alarm, gripper socks and pt to call before getting OOB       Length of Stay:  Expected LOS: 5d 12h  Actual LOS: YONAS Pitts

## 2021-02-21 NOTE — PROGRESS NOTES
0700: Bedside shift change report given to Roman Briceño RN (oncoming nurse) and [place], student nurse by Amrit Templeton RN (offgoing nurse). Report included the following information SBAR, Kardex, Intake/Output, MAR and Recent Results. 0700: [place], student nurse will be documenting on patient today. 5082: Dr. Db Guallpa at bedside. Pt oxygen back down to baseline 5LNC. Per MD continue current treatment. No new orders. 1900: Bedside shift change report given to Amrit Templeton RN (oncoming nurse) by Roman Briceño RN (offgoing nurse) and [place], student nurse. Report included the following information SBAR, Kardex, Intake/Output, MAR and Recent Results. I do not have access to end of shift notes.

## 2021-02-21 NOTE — PROGRESS NOTES
Hospitalist Progress Note    NAME: Chad Eng   :  1970   MRN:  [id number]       Assessment / Plan:  Acute hypercarbic respiratory failure due to  COVID-19 pneumonia  COPD exacerbation  Obesity hypoventilation syndrome  Restrictive lung disease due to super morbid obesity  -Rapid SARS-CoV-2 is positive  -On [facility] ED at [facility], PCT was 79 and improving to 68.  pH is also improving.  -Continue to use BiPAP at bedtime and as needed, currently on 5 L  -Appreciated pulmonology consultation  -Continue remdesivir, day 3  -Continue Decadron  -Continue azithromycin  -Continue Lasix  -Continue oxygen supplementation  -BiPAP as needed  -Patient looks comfortable today-  -Monitor inflammatory markers daily     Diabetes mellitus type 2  -Continue home Lantus at 35 units. Continue insulin sliding scale with blood sugar checks as well  -Hold home glyburide     Super morbid obesity  Hypertension  Dyslipidemia  Diastolic congestive heart failure not in exacerbation  Coronary artery disease  Obesity hypoventilation syndrome  History of breast cancer with L2 sclerotic lesion on CT scan in 2019.  -Continue Lasix, metoprolol, statin, aspirin, home inhalers  -Consider repeating a CT of spine given L2 sclerotic lesion since I do not see any follow-up study. Informed her to please follow-up with primary care physician given her history of breast cancer as well.        Code Status: Full code  Surrogate Decision Maker: Sanford Moreau     DVT Prophylaxis: Lovenox        Baseline: From home       Subjective:     Chief Complaint / Reason for Physician Visit  \" Patient reports significant improvement in shortness of breath, she is currently off the BiPAP, currently on 5 L oxygen. Dry cough is improving\". Discussed with RN events overnight.      Review of Systems:  Symptom Y/N Comments  Symptom Y/N Comments   Fever/Chills n   Chest Pain n    Poor Appetite n   Edema y  chronic lymphedema   Cough y   Abdominal Pain Sputum n   Joint Pain     SOB/RAZO y   Pruritis/Rash     Nausea/vomit n   Tolerating PT/OT     Diarrhea n   Tolerating Diet     Constipation n   Other       Could NOT obtain due to:      Objective:     VITALS:   Last 24hrs VS reviewed since prior progress note. Most recent are:  Patient Vitals for the past 24 hrs:   Temp Pulse Resp BP SpO2   02/21/21 1042 97.6 °F (36.4 °C) 70 19 107/68    02/21/21 0857     97 %   02/21/21 0756  65      02/21/21 0728 97.6 °F (36.4 °C) (!) 57 18 124/72 94 %   02/21/21 0718     94 %   02/21/21 0400  63      02/21/21 0348     100 %   02/21/21 0343 97.8 °F (36.6 °C) 63 17 111/79 98 %   02/21/21 0123     98 %   02/21/21 0117     97 %   02/21/21 0000  63      02/20/21 2258 97.6 °F (36.4 °C) 74 22 120/72 92 %   02/20/21 2118     93 %   02/20/21 2011 97.7 °F (36.5 °C) 69 17 126/71 93 %   02/20/21 1546  81      02/20/21 1507     95 %   02/20/21 1504 97.4 °F (36.3 °C) 75 18 107/67 94 %   02/20/21 1147  66      02/20/21 1113 98.7 °F (37.1 °C) 66 20 106/60 93 %       Intake/Output Summary (Last 24 hours) at 2/21/2021 1043  Last data filed at 2/21/2021 0935  Gross per 24 hour   Intake 1940 ml   Output 2400 ml   Net -460 ml        PHYSICAL EXAM:  General: WD, WN. Alert, cooperative, no acute distress. Patient is morbidly obese  EENT:  EOMI. Anicteric sclerae. MMM  Resp:  Poor air entry in all the lung fields, no wheezing or rales. No accessory muscle use  CV:  Regular  rhythm, chronic lymphedema  GI:  Soft, Non distended, Non tender.  +Bowel sounds  Neurologic:  Alert and oriented X 3, normal speech,   Psych:   Good insight. Not anxious nor agitated  Skin:  No rashes.   No jaundice    Reviewed most current lab test results and cultures  YES  Reviewed most current radiology test results   YES  Review and summation of old records today    NO  Reviewed patient's current orders and MAR    YES  PMH/SH reviewed - no change compared to H&P  ________________________________________________________________________  Care Plan discussed with:    Comments   Patient x    Family      RN x    Care Manager     Consultant                        Multidiciplinary team rounds were held today with , nursing, pharmacist and clinical coordinator. Patient's plan of care was discussed; medications were reviewed and discharge planning was addressed. ________________________________________________________________________  Total NON critical care TIME:  30   Minutes    Total CRITICAL CARE TIME Spent:   Minutes non procedure based      Comments   >50% of visit spent in counseling and coordination of care     ________________________________________________________________________  Yanelis Blackman MD     Procedures: see electronic medical records for all procedures/Xrays and details which were not copied into this note but were reviewed prior to creation of Plan. LABS:  I reviewed today's most current labs and imaging studies.   Pertinent labs include:  Recent Labs     02/21/21 0340 02/20/21 0527 02/19/21 0341   WBC 5.4 6.8 7.6   HGB 12.6 12.8 13.9   HCT 42.2 42.8 47.7*   * 102* 94*     Recent Labs     02/21/21  0340 02/20/21  0217 02/19/21  0341   * 136 134*   K 4.5 4.6 4.9    100 98   CO2 33* 33* 31   * 178* 241*   BUN 30* 29* 29*   CREA 1.03* 0.92 1.26*   CA 10.2* 10.4* 10.9*   ALB 3.1* 3.0* 3.4*   TBILI 0.3 0.4 0.4   ALT 36 31 34   INR  --   --  1.0       Signed: Yanelis Blackman MD

## 2021-02-21 NOTE — PROGRESS NOTES
Problem: Falls - Risk of  Goal: *Absence of Falls  Description: Document Green Salvia Fall Risk and appropriate interventions in the flowsheet.   Outcome: Progressing Towards Goal  Note: Fall Risk Interventions:            Medication Interventions: Assess postural VS orthostatic hypotension, Bed/chair exit alarm, Patient to call before getting OOB, Teach patient to arise slowly    Elimination Interventions: Bed/chair exit alarm, Call light in reach, Toileting schedule/hourly rounds, Patient to call for help with toileting needs

## 2021-02-21 NOTE — PROGRESS NOTES
Reason for Admission: Acute Respiratory Failure with Hypoxia                    RUR Score: 20                 PCP: First and Last name:  Gerardo Ramirez   Name of Practice: [facility]   Are you a current patient: Yes/No: Yes   Approximate date of last visit: over a month   Can you participate in a virtual visit if needed: In-person preferred    Do you (patient/family) have any concerns for transition/discharge? Pt reported that her trilogy machine she had previously been using no longer works, feels that the [address] here at [address] is more effective than her trilogy has been for night-time sleeping. Has concerns about her SOB/breathing changes - encouraged to discuss with MD's. CM also to leave FYI for MD's. Plan for utilizing home health: Not currently open with [facility] services. No PT/OT consults at this time. If woundcare or SN recommended by MD, CM to assist with setting up [facility]. Current Advanced Directive/Advance Care Plan:  ACP on file naming pt's son as primary mPOA Millicent Muse, [phone]) and friend as secondary mPOA (Cecile Olguin Dwaine, [phone]). Healthcare Decision Maker: primary mPOA Millicent Muse, [phone]) and friend as secondary mPOA (Cecile Olguin Dwaine, [phone])  Click here to [address] including selection of the Healthcare Decision Maker Relationship (ie \"Primary\")            Transition of Care Plan:   - Possible HH SN  - Medicaid BLS Transport at [address]  - DME - Trilogy vs. Bipap? ?   - 2nd IM Letter    51 YO Black Female admitted on 2/18/21 for Acute Respiratory Failure with Hypoxia. Pt had called [facility] who completed assessment at home and advised pt to go to [facility]. Lives in [place] home ([address]) with her adult son Millicent Muse). Pt reported her friend (Cecile Liu) as another source of support. Pt denies any current [facility] or A services, discontinued in November 2020.  Pt is primarily bedbound and uses Medicaid stretcher transportation. Hx of HH (At [facility]). Denies hx of SNF or IPR. Has BSC, shower chair, scooter, rollator, hospital bed, and 5LPM continuous home O2 ([employer]) at home. Reports that she no longer has her trilogy machine at night, interested in discussing further with medical team she feels a bipap would be more effective for sleep at night. Preferred Rx is [facility] ([address]). Has Medicare A&B and [facility] Medicaid. Dr. Thanh Beck (PCP)  Dr. Sanjuana Rod (Sleep Disorders)        Pt is COVID-19 positive, contacted via room phone to complete assessment and verify demographic information. No current PT/OT consults at this time. Wound Care Team consulted. Pulmonary consulted. CM discussed possible HH SN or resuming HHA's in the home - pt not agreeable to setting up at this time, but willing to discuss again prior to discharge so she can think about it. Pt will need Medicaid BLS transport for discharge home. CM will continue to remain available to assist with dc planning as needed    Care Management Interventions  PCP Verified by CM: Yes  Palliative Care Criteria Met (RRAT>21 & CHF Dx)?: No  Mode of Transport at Discharge: BLS(Medicaid transport)  Transition of Care Consult (CM Consult): Discharge Planning  Discharge Durable Medical Equipment: No(5LPM continuous O2 ([facility]), BSC, Shower Chair, [address], Rollator, Hospital Bed at home)  Health Maintenance Reviewed: Yes  Physical Therapy Consult: No  Occupational Therapy Consult: No  Speech Therapy Consult: No  Current Support Network: Other(One-story home ([address]) with son Royce Sethi), denies any current HHA's in the home)  Confirm Follow Up Transport: Other (see comment)(Medicaid Stretcher Transport)  The Plan for Transition of Care is Related to the Following Treatment Goals : Possible HH, Follow-up Appointments, DME needs/adjustments?  (Trilogy)  The Patient and/or Patient Representative was Provided with a Choice of Provider and Agrees with the Discharge Plan?: Yes  Name of the Patient Representative Who was Provided with a Choice of Provider and Agrees with the Discharge Plan: Varsha Saldivar (pt)  Discharge Location  Discharge Placement: Unable to determine at this time    Ragini Delvalle  Manager  [phone]

## 2021-02-22 LAB
ALBUMIN SERPL-MCNC: 2.8 G/DL (ref 3.5–5)
ALBUMIN/GLOB SERPL: 0.6 {RATIO} (ref 1.1–2.2)
ALP SERPL-CCNC: 83 U/L (ref 45–117)
ALT SERPL-CCNC: 38 U/L (ref 12–78)
ANION GAP SERPL CALC-SCNC: 3 MMOL/L (ref 5–15)
AST SERPL-CCNC: 28 U/L (ref 15–37)
BILIRUB SERPL-MCNC: 0.2 MG/DL (ref 0.2–1)
BUN SERPL-MCNC: 27 MG/DL (ref 6–20)
BUN/CREAT SERPL: 29 (ref 12–20)
CALCIUM SERPL-MCNC: 10.2 MG/DL (ref 8.5–10.1)
CHLORIDE SERPL-SCNC: 100 MMOL/L (ref 97–108)
CO2 SERPL-SCNC: 33 MMOL/L (ref 21–32)
CREAT SERPL-MCNC: 0.92 MG/DL (ref 0.55–1.02)
CRP SERPL-MCNC: 0.53 MG/DL (ref 0–0.6)
ERYTHROCYTE [DISTWIDTH] IN BLOOD BY AUTOMATED COUNT: 13.2 % (ref 11.5–14.5)
GLOBULIN SER CALC-MCNC: 4.5 G/DL (ref 2–4)
GLUCOSE BLD STRIP.AUTO-MCNC: 136 MG/DL (ref 65–100)
GLUCOSE BLD STRIP.AUTO-MCNC: 178 MG/DL (ref 65–100)
GLUCOSE BLD STRIP.AUTO-MCNC: 199 MG/DL (ref 65–100)
GLUCOSE BLD STRIP.AUTO-MCNC: 212 MG/DL (ref 65–100)
GLUCOSE SERPL-MCNC: 213 MG/DL (ref 65–100)
HCT VFR BLD AUTO: 42.4 % (ref 35–47)
HGB BLD-MCNC: 12.7 G/DL (ref 11.5–16)
MCH RBC QN AUTO: 27.9 PG (ref 26–34)
MCHC RBC AUTO-ENTMCNC: 30 G/DL (ref 30–36.5)
MCV RBC AUTO: 93 FL (ref 80–99)
NRBC # BLD: 0 K/UL (ref 0–0.01)
NRBC BLD-RTO: 0 PER 100 WBC
PLATELET # BLD AUTO: 144 K/UL (ref 150–400)
PMV BLD AUTO: 11.1 FL (ref 8.9–12.9)
POTASSIUM SERPL-SCNC: 4.5 MMOL/L (ref 3.5–5.1)
PROT SERPL-MCNC: 7.3 G/DL (ref 6.4–8.2)
RBC # BLD AUTO: 4.56 M/UL (ref 3.8–5.2)
SERVICE CMNT-IMP: ABNORMAL
SODIUM SERPL-SCNC: 136 MMOL/L (ref 136–145)
WBC # BLD AUTO: 4.1 K/UL (ref 3.6–11)

## 2021-02-22 PROCEDURE — 77010033678 HC OXYGEN DAILY

## 2021-02-22 PROCEDURE — 65660000000 HC RM CCU STEPDOWN

## 2021-02-22 PROCEDURE — 74011250637 HC RX REV CODE- 250/637: Performed by: INTERNAL MEDICINE

## 2021-02-22 PROCEDURE — 74011250636 HC RX REV CODE- 250/636: Performed by: INTERNAL MEDICINE

## 2021-02-22 PROCEDURE — 94660 CPAP INITIATION&MGMT: CPT

## 2021-02-22 PROCEDURE — 74011000258 HC RX REV CODE- 258: Performed by: INTERNAL MEDICINE

## 2021-02-22 PROCEDURE — 74011000250 HC RX REV CODE- 250: Performed by: INTERNAL MEDICINE

## 2021-02-22 PROCEDURE — 85027 COMPLETE CBC AUTOMATED: CPT

## 2021-02-22 PROCEDURE — 80053 COMPREHEN METABOLIC PANEL: CPT

## 2021-02-22 PROCEDURE — 36415 COLL VENOUS BLD VENIPUNCTURE: CPT

## 2021-02-22 PROCEDURE — 82962 GLUCOSE BLOOD TEST: CPT

## 2021-02-22 PROCEDURE — 94640 AIRWAY INHALATION TREATMENT: CPT

## 2021-02-22 PROCEDURE — 74011636637 HC RX REV CODE- 636/637: Performed by: INTERNAL MEDICINE

## 2021-02-22 PROCEDURE — 86140 C-REACTIVE PROTEIN: CPT

## 2021-02-22 RX ADMIN — ALBUTEROL SULFATE 2 PUFF: 90 AEROSOL, METERED RESPIRATORY (INHALATION) at 20:00

## 2021-02-22 RX ADMIN — Medication: at 08:12

## 2021-02-22 RX ADMIN — FUROSEMIDE 40 MG: 40 TABLET ORAL at 17:26

## 2021-02-22 RX ADMIN — CYCLOBENZAPRINE 5 MG: 10 TABLET, FILM COATED ORAL at 00:10

## 2021-02-22 RX ADMIN — REMDESIVIR 100 MG: 100 INJECTION, POWDER, LYOPHILIZED, FOR SOLUTION INTRAVENOUS at 08:46

## 2021-02-22 RX ADMIN — ASPIRIN 81 MG: 81 TABLET, CHEWABLE ORAL at 08:11

## 2021-02-22 RX ADMIN — PANTOPRAZOLE SODIUM 40 MG: 40 TABLET, DELAYED RELEASE ORAL at 08:11

## 2021-02-22 RX ADMIN — ACETAMINOPHEN 650 MG: 325 TABLET ORAL at 22:52

## 2021-02-22 RX ADMIN — INSULIN LISPRO 2 UNITS: 100 INJECTION, SOLUTION INTRAVENOUS; SUBCUTANEOUS at 08:11

## 2021-02-22 RX ADMIN — ENOXAPARIN SODIUM 40 MG: 40 INJECTION SUBCUTANEOUS at 08:11

## 2021-02-22 RX ADMIN — CYCLOBENZAPRINE 5 MG: 10 TABLET, FILM COATED ORAL at 08:46

## 2021-02-22 RX ADMIN — FLUTICASONE PROPIONATE 2 SPRAY: 50 SPRAY, METERED NASAL at 08:12

## 2021-02-22 RX ADMIN — METOPROLOL TARTRATE 25 MG: 25 TABLET, FILM COATED ORAL at 17:26

## 2021-02-22 RX ADMIN — LORATADINE 10 MG: 10 TABLET ORAL at 08:10

## 2021-02-22 RX ADMIN — AZITHROMYCIN MONOHYDRATE 500 MG: 500 INJECTION, POWDER, LYOPHILIZED, FOR SOLUTION INTRAVENOUS at 10:19

## 2021-02-22 RX ADMIN — INSULIN LISPRO 3 UNITS: 100 INJECTION, SOLUTION INTRAVENOUS; SUBCUTANEOUS at 12:26

## 2021-02-22 RX ADMIN — CYCLOBENZAPRINE 5 MG: 10 TABLET, FILM COATED ORAL at 22:53

## 2021-02-22 RX ADMIN — INSULIN GLARGINE 35 UNITS: 100 INJECTION, SOLUTION SUBCUTANEOUS at 08:11

## 2021-02-22 RX ADMIN — ATORVASTATIN CALCIUM 10 MG: 10 TABLET, FILM COATED ORAL at 08:11

## 2021-02-22 RX ADMIN — ACETAMINOPHEN 650 MG: 325 TABLET ORAL at 00:11

## 2021-02-22 RX ADMIN — ENOXAPARIN SODIUM 40 MG: 40 INJECTION SUBCUTANEOUS at 22:53

## 2021-02-22 RX ADMIN — FUROSEMIDE 40 MG: 40 TABLET ORAL at 08:11

## 2021-02-22 RX ADMIN — DEXAMETHASONE SODIUM PHOSPHATE 6 MG: 4 INJECTION, SOLUTION INTRA-ARTICULAR; INTRALESIONAL; INTRAMUSCULAR; INTRAVENOUS; SOFT TISSUE at 22:53

## 2021-02-22 RX ADMIN — ACETAMINOPHEN 650 MG: 325 TABLET ORAL at 10:19

## 2021-02-22 RX ADMIN — SPIRONOLACTONE 25 MG: 25 TABLET ORAL at 08:11

## 2021-02-22 RX ADMIN — ALBUTEROL SULFATE 2 PUFF: 90 AEROSOL, METERED RESPIRATORY (INHALATION) at 14:00

## 2021-02-22 NOTE — PROGRESS NOTES
0700H- Verbal shift change report given to JODI (oncoming nurse) by Wallace Downing (offgoing nurse). Report included the following information SBAR, Kardex, ED Summary, Procedure Summary, Intake/Output, MAR, Recent Results and Med Rec Status. 1000H- Seen and examined by Dr. Tony Vasques with no new orders noted. End of Shift Note    Bedside shift change report given to MCKENNA (oncoming nurse) by King Augustin (offgoing nurse). Report included the following information SBAR, Kardex, ED Summary and OR Summary    Shift worked: 0700- 1900     Shift summary and any significant changes:    NONE AT ALL     Concerns for physician to address: NONE AT ALL     Zone phone for oncoming shift:   XXX       Activity:  Activity Level: Up with Assistance  Number times ambulated in hallways past shift: 3  Number of times OOB to chair past shift: 3    Cardiac:   Cardiac Monitoring: Yes      Cardiac Rhythm: Sinus bradycardia    Access:   Current line(s): PIV     Genitourinary:   Urinary status: voiding    Respiratory:   O2 Device: Nasal cannula  Chronic home O2 use?: YES  Incentive spirometer at bedside: YES     GI:  Last Bowel Movement Date: 02/18/21  Current diet:  DIET DIABETIC WITH OPTIONS Consistent Carb 1800kcal; Regular  DIET ONE TIME MESSAGE  Passing flatus: YES  Tolerating current diet: YES  % Diet Eaten: 100 %    Pain Management:   Patient states pain is manageable on current regimen: N/A    Skin:  Jeffrey Score: 19  Interventions: increase time out of bed    Patient Safety:  Fall Score:  Total Score: 2  Interventions: pt to call before getting OOB       Length of Stay:  Expected LOS: 5d 12h  Actual LOS: [facility]

## 2021-02-22 NOTE — PROGRESS NOTES
Pulmonary, Critical Care, and Sleep Medicine    Patient Consult    Name: Teresa Lopez MRN: [id number]   : 1970 Hospital: [facility]   Date: 2021        IMPRESSION:   · Acute/chronic hypoxic/hypercapnic respiratory failure-seems to be stable over last 24 hrs. Uses bipap at night and oxygen during day. · COVID 19 pneumonia  · GEN/OHS, decompensated - patient of Dr. Guy Riding of [facility] sleep  · COPD/asthma - no prior PFTs, does not follow with us, not clearly exacerbated, no wheezing, appear comfortable with her breathing this am.   · DM-blood sugars are reasonable. · CAD  · Chronic back pain. · HTN  · Morbid obesity  · Reviewed nursing other providers notes. Reviewed labs, CXR, orders and meds this am.       RECOMMENDATIONS:   · Continue NIV as needed and while sleeping. · Wean oxygen as tolerated. Wean to keep pox over 90% on 5L this am.   · Dexamethasone x 10 days  · Remdesivir x 5 days  · Bronchodilators   · Empiric antibiotics   · Back pain control  · Bariatric bed. · Monitor inflammatory markers  · Insulin  · DVT prophylaxis     Subjective:     21: Lying on her side. Wore BIPAP last night. Still with chronic back pain. On NC oxygen this am when seen. No headaches, no GERD. NO sinus pain or pressure. Notes occasional spasms of back, ON flexeril. I met pt many years ago. Chart reviewed. Smoked for 30 yrs. 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 Father and mother ill so she quit school to help. Had twins but one never made it home. Son is now age 32. Pt weighed > 445. Now 376. Severe RAZO and unable to walk much in her home. Pt used to see. . 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 Pt lives with nephew. Pt has been on O2 since . Had heart attack and sees Dr. Larene Favre.  Saw NP who did home visit, Allie Varma who is with [phone] home visiting physicians based in [facility] [place]. Trilogy prescribed by Aide Sparks.       2-21-21: Pt was up at bedside this am. Eating her breakfast. She noted some acute on chronic back pain. She used the bipap last pm.   On NC oxygen this am when seen. No headaches, no GERD. NO sinus pain or pressure. Notes occasional spasms of back, ON flexeril. No acute issues per nursing when seen this am.     2-20: Patient seen this am. No chest pain, no back pain, Tolerating niv well last night. Has mild coughing, congestion. No leg pain, no headaches. Good appetite. This patient has been seen and evaluated at the request of Dr. Elton Alfaro for respiratory failure. Patient is a 52 y.o. female with GEN/OHS with frequent admissions, presented with >1 week history of increased dyspnea with associated cough. She was found to be severely hypoxic and COVID testing was positive. She is now on BiPAP with improved SpO2. She reports a history of COPD, though she has never been formally tested for that.      MEDS:   Current Facility-Administered Medications   Medication    cyclobenzaprine (FLEXERIL) tablet 5 mg    albuterol (PROVENTIL HFA, VENTOLIN HFA, PROAIR HFA) inhaler 2 Puff    loratadine (CLARITIN) tablet 10 mg    enoxaparin (LOVENOX) injection 40 mg    remdesivir 100 mg in 0.9% sodium chloride 250 mL IVPB    azithromycin (ZITHROMAX) 500 mg in 0.9% sodium chloride 250 mL (VIAL-MATE)    ammonium lactate (LAC-HYDRIN) 12 % lotion    acetaminophen (TYLENOL) tablet 650 mg    ondansetron (ZOFRAN) injection 4 mg    aspirin chewable tablet 81 mg    fluticasone propionate (FLONASE) 50 mcg/actuation nasal spray 2 Spray    furosemide (LASIX) tablet 40 mg    insulin glargine (LANTUS) injection 35 Units    atorvastatin (LIPITOR) tablet 10 mg    metoprolol tartrate (LOPRESSOR) tablet 25 mg    pantoprazole (PROTONIX) tablet 40 mg    spironolactone (ALDACTONE) tablet 25 mg    dexamethasone (DECADRON) 4 mg/mL injection 6 mg    insulin lispro (HUMALOG) injection    glucose chewable tablet 16 g    dextrose (D50W) injection syrg 12.5-25 g    glucagon (GLUCAGEN) injection 1 mg        Past Medical History:   Diagnosis Date    Arthritis     Asthma     Breast lump     CAD (coronary artery disease)     Cancer (HCC)     breast cancer    Congestive heart failure (HCC)     COPD [facility]chronic obstructive pulmonary disease) ([facility]     Diabetes ([facility]     Hypertension     Morbid obesity with BMI of 70 and over, adult ([facility]     NSTEMI (non-ST elevated myocardial infarction) ([facility]     NSTEMI (non-ST elevated myocardial infarction) ([facility] 2012    SVT (supraventricular tachycardia) ([facility])       Past Surgical History:   Procedure Laterality Date    HX  SECTION      HX ORTHOPAEDIC      HX OTHER SURGICAL      cyst removed from back    SD CARDIAC SURG PROCEDURE UNLIST      Stents      Prior to Admission medications    Medication Sig Start Date End Date Taking? Authorizing Provider   spironolactone (ALDACTONE) 25 mg tablet Take 1 Tab by mouth daily. 20   Vignesh Blackman MD   cyclobenzaprine (FLEXERIL) 5 mg tablet Take 5 mg by mouth two (2) times a day. Provider, Historical   fluticasone-umeclidinium-vilanterol (Trelegy Ellipta) 100-62.5-25 mcg inhaler Take 1 Puff by inhalation daily. Provider, Historical   NovoLOG Flexpen U-100 Insulin 100 unit/mL (3 mL) inpn 5-10 Units by SubCUTAneous route as directed. Sliding scale: -300: 5 units -400 units: 10 units 20   Provider, Historical   ammonium lactate (LAC-HYDRIN) 12 % lotion Apply 1 Applicator to affected area as needed. rub in to affected area well    Provider, Historical   omeprazole (PRILOSEC) 40 mg capsule Take 40 mg by mouth daily. Provider, Historical   nystatin (MYCOSTATIN) topical cream Apply  to affected area two (2) times a day. 19   Suyapa FERREIRA MD   albuterol-ipratropium (DUO-NEB) 2.5 mg-0.5 mg/3 ml nebu 3 mL by Nebulization route four (4) times daily.  3/3/19   Evens Patel Mustapha WALLS MD   furosemide (LASIX) 40 mg tablet Take 40 mg by mouth two (2) times a day.    Other, MD Landon   fluticasone (FLONASE) 50 mcg/actuation nasal spray 2 Sprays by Both Nostrils route daily. 1/6/19   Med Mcgrath MD   insulin glargine (LANTUS) 100 unit/mL injection 35 Units by SubCUTAneous route daily.    Provider, Historical   metoprolol tartrate (LOPRESSOR) 25 mg tablet Take 25 mg by mouth two (2) times a day.    Provider, Historical   loratadine (CLARITIN) 10 mg tablet Take 10 mg by mouth daily.    Provider, Historical   albuterol (VENTOLIN HFA) 90 mcg/actuation inhaler Take 2 Puffs by inhalation every six (6) hours as needed for Wheezing.    Provider, Historical   aspirin 81 mg chewable tablet Take 81 mg by mouth daily.    Provider, Historical   lovastatin (MEVACOR) 40 mg tablet Take 1 Tab by mouth nightly. 1/14/16   Delmy Wilson NP   glyBURIDE (DIABETA) 5 mg tablet Take 5 mg by mouth two (2) times daily (with meals).    Provider, Historical   nitroglycerin (NITROSTAT) 0.4 mg SL tablet 1 Tab by SubLINGual route every five (5) minutes as needed for Chest Pain (call 911 if not relieved by 3). 9/12/13   Delmy Wilson NP     No Known Allergies   Social History     Tobacco Use   • Smoking status: Former Smoker     Packs/day: 0.25     Types: Cigarettes   • Smokeless tobacco: Never Used   • Tobacco comment: Patient states \"I  aint smoking no more d*mn cigarettes after yesterday\" 01/26/2018   Substance Use Topics   • Alcohol use: No      Family History   Problem Relation Age of Onset   • Heart Disease Mother    • Heart Disease Father    • Heart Disease Sister    • Breast Cancer Maternal Grandmother    • Breast Cancer Paternal Grandmother           Review of Systems:  A comprehensive review of systems was negative except for that written in the HPI.    Objective:   Vital Signs:    Visit Vitals  /72 (BP 1 Location: Left lower arm, BP Patient Position: At rest)   Pulse 64   Temp 97.8 °F (36.6 °C)  Resp 16   Ht 5' 7\" (1.702 m)   Wt (!) 170.6 kg (376 lb 3.2 oz)   SpO2 92%   BMI 58.92 kg/m²       O2 Device: Nasal cannula   O2 Flow Rate (L/min): 4 l/min   Temp (24hrs), Av.7 °F (36.5 °C), Min:97.5 °F (36.4 °C), Max:98 °F (36.7 °C)       Intake/Output:   Last shift:      701 - 1900  In: -   Out: 350 [Urine:350]  Last 3 shifts: 1901 -  07  In: 193 [P.O.:480; I.V.:500]  Out: 2402 [Urine:2401]    Intake/Output Summary (Last 24 hours) at 2021 1220  Last data filed at 2021 0729  Gross per 24 hour   Intake 730 ml   Output 752 ml   Net -22 ml      Physical Exam:   General:  Morbidly obese, sleeping, on 7L nc with pox of 93%. Head:  Normocephalic, without obvious abnormality, atraumatic. Eyes:  Conjunctivae/corneas clear. PERRL, EOMs intact. Nose: Nares normal. Septum midline. Mucosa normal.     Throat: Lips, mucosa, and tongue normal.    Neck: Supple, symmetrical, trachea midline    Back:   Symmetric, no curvature. ROM normal.   Lungs:   Distant bilateral breath sounds, clear, breathing comfortably. Chest wall:  No tenderness or deformity. Heart:  Regular rate and rhythm    Abdomen:   Soft, non-tender. Bowel sounds normal.  Obese. Extremities: Extremities normal, atraumatic, no cyanosis or clubbing   Skin: Skin color, texture, turgor normal. Significant hirsutism. Lymph nodes: Cervical, supraclavicular nodes normal.   Neurologic: Grossly nonfocal, motor is intact.       Data review:     Recent Results (from the past 24 hour(s))   GLUCOSE, POC    Collection Time: 21  4:48 PM   Result Value Ref Range    Glucose (POC) 200 (H) 65 - 100 mg/dL    Performed by Santy CASAS    GLUCOSE, POC    Collection Time: 21  9:16 PM   Result Value Ref Range    Glucose (POC) 143 (H) 65 - 100 mg/dL    Performed by Ulysses Tineo    C REACTIVE PROTEIN, QT    Collection Time: 21  4:13 AM   Result Value Ref Range    C-Reactive protein 0.53 0.00 - 0.60 mg/dL   CBC W/O DIFF    Collection Time: 02/22/21  4:13 AM   Result Value Ref Range    WBC 4.1 3.6 - 11.0 K/uL    RBC 4.56 3.80 - 5.20 M/uL    HGB 12.7 11.5 - 16.0 g/dL    HCT 42.4 35.0 - 47.0 %    MCV 93.0 80.0 - 99.0 FL    MCH 27.9 26.0 - 34.0 PG    MCHC 30.0 30.0 - 36.5 g/dL    RDW 13.2 11.5 - 14.5 %    PLATELET 600 (L) 712 - 400 K/uL    MPV 11.1 8.9 - 12.9 FL    NRBC 0.0 0  WBC    ABSOLUTE NRBC 0.00 0.00 - 6.95 K/uL   METABOLIC PANEL, COMPREHENSIVE    Collection Time: 02/22/21  4:13 AM   Result Value Ref Range    Sodium 136 136 - 145 mmol/L    Potassium 4.5 3.5 - 5.1 mmol/L    Chloride 100 97 - 108 mmol/L    CO2 33 (H) 21 - 32 mmol/L    Anion gap 3 (L) 5 - 15 mmol/L    Glucose 213 (H) 65 - 100 mg/dL    BUN 27 (H) 6 - 20 MG/DL    Creatinine 0.92 0.55 - 1.02 MG/DL    BUN/Creatinine ratio 29 (H) 12 - 20      GFR est AA >60 >60 ml/min/1.73m2    GFR est non-AA >60 >60 ml/min/1.73m2    Calcium 10.2 (H) 8.5 - 10.1 MG/DL    Bilirubin, total 0.2 0.2 - 1.0 MG/DL    ALT (SGPT) 38 12 - 78 U/L    AST (SGOT) 28 15 - 37 U/L    Alk. phosphatase 83 45 - 117 U/L    Protein, total 7.3 6.4 - 8.2 g/dL    Albumin 2.8 (L) 3.5 - 5.0 g/dL    Globulin 4.5 (H) 2.0 - 4.0 g/dL    A-G Ratio 0.6 (L) 1.1 - 2.2     GLUCOSE, POC    Collection Time: 02/22/21  7:27 AM   Result Value Ref Range    Glucose (POC) 199 (H) 65 - 100 mg/dL    Performed by Carolynn Brown PCT    GLUCOSE, POC    Collection Time: 02/22/21 11:45 AM   Result Value Ref Range    Glucose (POC) 212 (H) 65 - 100 mg/dL    Performed by Carolynn Brown PCT        Imaging:  I have personally reviewed the patients radiographs and have reviewed the reports:  2-18-21: IMPRESSION  1. Enlarged cardiac silhouette.  Prominence of the right hilum is felt to be  technical. When the patient can tolerate recommend PA and lateral with good  inspiration otherwise no acute abnormality, I agree with radiology interpretation         Monserrat Singer MD

## 2021-02-22 NOTE — PROGRESS NOTES
1900- Bedside shift change report given to Jim Singh RN (oncoming nurse) by Marcella Alejo RN (offgoing nurse). Report included the following information SBAR, Kardex, ED Summary, Intake/Output, MAR, Recent Results, Med Rec Status and Cardiac Rhythm NSR.     0700- End of Shift Note    Bedside shift change report given to Adonay Armendariz RN (oncoming nurse) by YONAS Peck (offgoing nurse). Report included the following information SBAR, Kardex, ED Summary, Intake/Output, MAR, Recent Results, Med Rec Status and Cardiac Rhythm NSR    Shift worked:  7p-7a     Shift summary and any significant changes:     no change     Concerns for physician to address:  none     Zone phone for oncoming shift:          Activity:  Activity Level: Up with Assistance  Number times ambulated in hallways past shift: 0  Number of times OOB to chair past shift: 0    Cardiac:   Cardiac Monitoring: Yes      Cardiac Rhythm: Normal sinus rhythm    Access:   Current line(s): PIV     Genitourinary:   Urinary status: voiding    Respiratory:   O2 Device: BIPAP  Chronic home O2 use?: YES  Incentive spirometer at bedside: YES     GI:  Last Bowel Movement Date: 02/18/21  Current diet:  DIET DIABETIC WITH OPTIONS Consistent Carb 1800kcal; Regular  DIET ONE TIME MESSAGE  Passing flatus: YES  Tolerating current diet: YES  % Diet Eaten: 100 %    Pain Management:   Patient states pain is manageable on current regimen: YES    Skin:  Jeffrey Score: 19  Interventions: speciality bed, increase time out of bed, PT/OT consult and nutritional support     Patient Safety:  Fall Score:  Total Score: 2  Interventions: bed/chair alarm, gripper socks and pt to call before getting OOB       Length of Stay:  Expected LOS: 5d 12h  Actual LOS: [address]  [facility] YNOAS Walker

## 2021-02-22 NOTE — PROGRESS NOTES
Hospitalist Progress Note    NAME: Riya Pulido   :  1970   MRN:  [id number]       Assessment / Plan:  Acute hypercarbic respiratory failure due to  COVID-19 pneumonia  COPD exacerbation  Obesity hypoventilation syndrome  Restrictive lung disease due to super morbid obesity  -Rapid SARS-CoV-2 is positive  -On arrival to ED at \[facility]\"", PCT was 79 and improving to 68. Currently off the BiPAP and uses at bedtime.  -Continue to use BiPAP at bedtime and as needed, currently on 5-6 L  -Appreciated pulmonology consultation  -Continue remdesivir, day 4  -Continue Decadron  -Continue azithromycin  -Continue Lasix  -Continue oxygen supplementation  -BiPAP as needed  -Patient looks comfortable today-  -Monitor inflammatory markers daily  -Possible discharge tomorrow after fifth dose of remdesivir  -Oxygen requirement is close to baseline    Diabetes mellitus type 2  -Continue home Lantus at 35 units. Continue insulin sliding scale with blood sugar checks as well  -Hold home glyburide     Super morbid obesity  Hypertension  Dyslipidemia  Diastolic congestive heart failure not in exacerbation  Coronary artery disease  Obesity hypoventilation syndrome  History of breast cancer with L2 sclerotic lesion on CT scan in 2019.  -Continue Lasix, metoprolol, statin, aspirin, home inhalers  -Consider repeating a CT of spine given L2 sclerotic lesion since I do not see any follow-up study. Informed her to please follow-up with primary care physician given her history of breast cancer as well.        Code Status: Full code  Surrogate Decision Maker: Sanford Castanon     DVT Prophylaxis: Lovenox        Baseline: From home       Subjective:     Chief Complaint / Reason for Physician Visit  \" Patient reports significant improvement in shortness of breath, she is currently off the BiPAP, currently on 5 to 6 L oxygen, reported headache this morning\". Discussed with RN events overnight.      Review of Systems:  Symptom Y/N Comments  Symptom Y/N Comments   Fever/Chills n   Chest Pain n    Poor Appetite n   Edema y  chronic lymphedema   Cough y   Abdominal Pain     Sputum n   Joint Pain     SOB/RAZO y   Pruritis/Rash     Nausea/vomit n   Tolerating PT/OT     Diarrhea n   Tolerating Diet     Constipation n   Other       Could NOT obtain due to:      Objective:     VITALS:   Last 24hrs VS reviewed since prior progress note. Most recent are:  Patient Vitals for the past 24 hrs:   Temp Pulse Resp BP SpO2   02/22/21 1146 97.8 °F (36.6 °C) 64 16 114/72 92 %   02/22/21 0808 97.8 °F (36.6 °C) 60 21 (!) 95/55 94 %   02/22/21 0252     99 %   02/22/21 0250 98 °F (36.7 °C) 64 17 (!) 97/59 99 %   02/22/21 0015 97.6 °F (36.4 °C) 65 16 117/77 92 %   02/21/21 1946 97.5 °F (36.4 °C) 64 19 97/83 93 %   02/21/21 1926     96 %   02/21/21 1553  67      02/21/21 1430 97.6 °F (36.4 °C) 74 17 117/74 95 %       Intake/Output Summary (Last 24 hours) at 2/22/2021 1347  Last data filed at 2/22/2021 0295  Gross per 24 hour   Intake 490 ml   Output 752 ml   Net -262 ml      Face-to-face encounter was provided on February 22, 2021 with the following findings  PHYSICAL EXAM:  General: WD, WN. Alert, cooperative, no acute distress. Patient is morbidly obese  EENT:  EOMI. Anicteric sclerae. MMM  Resp:  Poor air entry in all the lung fields, no wheezing or rales. No accessory muscle use  CV:  Regular  rhythm, chronic lymphedema  GI:  Soft, Non distended, Non tender.  +Bowel sounds  Neurologic:  Alert and oriented X 3, normal speech,   Psych:   Good insight. Not anxious nor agitated  Skin:  No rashes.   No jaundice    Reviewed most current lab test results and cultures  YES  Reviewed most current radiology test results   YES  Review and summation of old records today    NO  Reviewed patient's current orders and MAR    YES  PMH/SH reviewed - no change compared to H&P  ________________________________________________________________________  Care Plan discussed with:    Comments   Patient x    Family      RN x    Care Manager     Consultant                        Multidiciplinary team rounds were held today with , nursing, pharmacist and clinical coordinator. Patient's plan of care was discussed; medications were reviewed and discharge planning was addressed. ________________________________________________________________________  Total NON critical care TIME:  30   Minutes    Total CRITICAL CARE TIME Spent:   Minutes non procedure based      Comments   >50% of visit spent in counseling and coordination of care     ________________________________________________________________________  Pb Bearden MD     Procedures: see electronic medical records for all procedures/Xrays and details which were not copied into this note but were reviewed prior to creation of Plan. LABS:  I reviewed today's most current labs and imaging studies.   Pertinent labs include:  Recent Labs     02/22/21  0413 02/21/21  0340 02/20/21  0527   WBC 4.1 5.4 6.8   HGB 12.7 12.6 12.8   HCT 42.4 42.2 42.8   * 117* 102*     Recent Labs     02/22/21  0413 02/21/21  0340 02/20/21  0217    135* 136   K 4.5 4.5 4.6    100 100   CO2 33* 33* 33*   * 200* 178*   BUN 27* 30* 29*   CREA 0.92 1.03* 0.92   CA 10.2* 10.2* 10.4*   ALB 2.8* 3.1* 3.0*   TBILI 0.2 0.3 0.4   ALT 38 36 31       Signed: Pb Bearden MD

## 2021-02-23 LAB
ALBUMIN SERPL-MCNC: 3 G/DL (ref 3.5–5)
ALBUMIN/GLOB SERPL: 0.7 {RATIO} (ref 1.1–2.2)
ALP SERPL-CCNC: 86 U/L (ref 45–117)
ALT SERPL-CCNC: 47 U/L (ref 12–78)
ANION GAP SERPL CALC-SCNC: 2 MMOL/L (ref 5–15)
AST SERPL-CCNC: 29 U/L (ref 15–37)
BILIRUB SERPL-MCNC: 0.3 MG/DL (ref 0.2–1)
BUN SERPL-MCNC: 22 MG/DL (ref 6–20)
BUN/CREAT SERPL: 24 (ref 12–20)
CALCIUM SERPL-MCNC: 10.1 MG/DL (ref 8.5–10.1)
CHLORIDE SERPL-SCNC: 101 MMOL/L (ref 97–108)
CO2 SERPL-SCNC: 34 MMOL/L (ref 21–32)
CREAT SERPL-MCNC: 0.92 MG/DL (ref 0.55–1.02)
CRP SERPL-MCNC: 0.34 MG/DL (ref 0–0.6)
ERYTHROCYTE [DISTWIDTH] IN BLOOD BY AUTOMATED COUNT: 13.2 % (ref 11.5–14.5)
GLOBULIN SER CALC-MCNC: 4.6 G/DL (ref 2–4)
GLUCOSE BLD STRIP.AUTO-MCNC: 140 MG/DL (ref 65–100)
GLUCOSE BLD STRIP.AUTO-MCNC: 162 MG/DL (ref 65–100)
GLUCOSE BLD STRIP.AUTO-MCNC: 191 MG/DL (ref 65–100)
GLUCOSE BLD STRIP.AUTO-MCNC: 229 MG/DL (ref 65–100)
GLUCOSE SERPL-MCNC: 185 MG/DL (ref 65–100)
HCT VFR BLD AUTO: 44.3 % (ref 35–47)
HGB BLD-MCNC: 13.1 G/DL (ref 11.5–16)
MCH RBC QN AUTO: 27.6 PG (ref 26–34)
MCHC RBC AUTO-ENTMCNC: 29.6 G/DL (ref 30–36.5)
MCV RBC AUTO: 93.5 FL (ref 80–99)
NRBC # BLD: 0 K/UL (ref 0–0.01)
NRBC BLD-RTO: 0 PER 100 WBC
PLATELET # BLD AUTO: 141 K/UL (ref 150–400)
PMV BLD AUTO: 12 FL (ref 8.9–12.9)
POTASSIUM SERPL-SCNC: 5 MMOL/L (ref 3.5–5.1)
PROT SERPL-MCNC: 7.6 G/DL (ref 6.4–8.2)
RBC # BLD AUTO: 4.74 M/UL (ref 3.8–5.2)
SERVICE CMNT-IMP: ABNORMAL
SODIUM SERPL-SCNC: 137 MMOL/L (ref 136–145)
WBC # BLD AUTO: 6.7 K/UL (ref 3.6–11)

## 2021-02-23 PROCEDURE — 82962 GLUCOSE BLOOD TEST: CPT

## 2021-02-23 PROCEDURE — 74011250636 HC RX REV CODE- 250/636: Performed by: INTERNAL MEDICINE

## 2021-02-23 PROCEDURE — 94660 CPAP INITIATION&MGMT: CPT

## 2021-02-23 PROCEDURE — 74011250637 HC RX REV CODE- 250/637: Performed by: INTERNAL MEDICINE

## 2021-02-23 PROCEDURE — 74011636637 HC RX REV CODE- 636/637: Performed by: INTERNAL MEDICINE

## 2021-02-23 PROCEDURE — 74011000250 HC RX REV CODE- 250: Performed by: INTERNAL MEDICINE

## 2021-02-23 PROCEDURE — 74011000258 HC RX REV CODE- 258: Performed by: INTERNAL MEDICINE

## 2021-02-23 PROCEDURE — 36415 COLL VENOUS BLD VENIPUNCTURE: CPT

## 2021-02-23 PROCEDURE — 86140 C-REACTIVE PROTEIN: CPT

## 2021-02-23 PROCEDURE — 77010033678 HC OXYGEN DAILY

## 2021-02-23 PROCEDURE — 94640 AIRWAY INHALATION TREATMENT: CPT

## 2021-02-23 PROCEDURE — 85027 COMPLETE CBC AUTOMATED: CPT

## 2021-02-23 PROCEDURE — 65660000000 HC RM CCU STEPDOWN

## 2021-02-23 PROCEDURE — 80053 COMPREHEN METABOLIC PANEL: CPT

## 2021-02-23 RX ORDER — ALBUTEROL SULFATE 90 UG/1
2 AEROSOL, METERED RESPIRATORY (INHALATION)
Qty: 1 INHALER | Refills: 1 | Status: SHIPPED | OUTPATIENT
Start: 2021-02-23

## 2021-02-23 RX ORDER — DEXAMETHASONE 6 MG/1
6 TABLET ORAL
Qty: 4 TAB | Refills: 0 | Status: SHIPPED | OUTPATIENT
Start: 2021-02-23 | End: 2021-02-27

## 2021-02-23 RX ADMIN — ALBUTEROL SULFATE 2 PUFF: 90 AEROSOL, METERED RESPIRATORY (INHALATION) at 13:10

## 2021-02-23 RX ADMIN — ACETAMINOPHEN 650 MG: 325 TABLET ORAL at 17:20

## 2021-02-23 RX ADMIN — ASPIRIN 81 MG: 81 TABLET, CHEWABLE ORAL at 08:37

## 2021-02-23 RX ADMIN — PANTOPRAZOLE SODIUM 40 MG: 40 TABLET, DELAYED RELEASE ORAL at 08:37

## 2021-02-23 RX ADMIN — FLUTICASONE PROPIONATE 2 SPRAY: 50 SPRAY, METERED NASAL at 08:53

## 2021-02-23 RX ADMIN — CYCLOBENZAPRINE 5 MG: 10 TABLET, FILM COATED ORAL at 08:45

## 2021-02-23 RX ADMIN — ALBUTEROL SULFATE 2 PUFF: 90 AEROSOL, METERED RESPIRATORY (INHALATION) at 19:17

## 2021-02-23 RX ADMIN — LORATADINE 10 MG: 10 TABLET ORAL at 08:36

## 2021-02-23 RX ADMIN — FUROSEMIDE 40 MG: 40 TABLET ORAL at 17:17

## 2021-02-23 RX ADMIN — ENOXAPARIN SODIUM 40 MG: 40 INJECTION SUBCUTANEOUS at 22:30

## 2021-02-23 RX ADMIN — ALBUTEROL SULFATE 2 PUFF: 90 AEROSOL, METERED RESPIRATORY (INHALATION) at 07:29

## 2021-02-23 RX ADMIN — INSULIN LISPRO 2 UNITS: 100 INJECTION, SOLUTION INTRAVENOUS; SUBCUTANEOUS at 17:17

## 2021-02-23 RX ADMIN — INSULIN GLARGINE 35 UNITS: 100 INJECTION, SOLUTION SUBCUTANEOUS at 08:35

## 2021-02-23 RX ADMIN — ATORVASTATIN CALCIUM 10 MG: 10 TABLET, FILM COATED ORAL at 08:38

## 2021-02-23 RX ADMIN — INSULIN LISPRO 2 UNITS: 100 INJECTION, SOLUTION INTRAVENOUS; SUBCUTANEOUS at 08:35

## 2021-02-23 RX ADMIN — CYCLOBENZAPRINE 5 MG: 10 TABLET, FILM COATED ORAL at 22:30

## 2021-02-23 RX ADMIN — ACETAMINOPHEN 650 MG: 325 TABLET ORAL at 22:30

## 2021-02-23 RX ADMIN — INSULIN LISPRO 3 UNITS: 100 INJECTION, SOLUTION INTRAVENOUS; SUBCUTANEOUS at 11:35

## 2021-02-23 RX ADMIN — ENOXAPARIN SODIUM 40 MG: 40 INJECTION SUBCUTANEOUS at 08:36

## 2021-02-23 RX ADMIN — REMDESIVIR 100 MG: 100 INJECTION, POWDER, LYOPHILIZED, FOR SOLUTION INTRAVENOUS at 09:28

## 2021-02-23 RX ADMIN — AZITHROMYCIN MONOHYDRATE 500 MG: 500 INJECTION, POWDER, LYOPHILIZED, FOR SOLUTION INTRAVENOUS at 11:35

## 2021-02-23 NOTE — PROGRESS NOTES
Problem: Falls - Risk of  Goal: *Absence of Falls  Description: Document Laverne Crews Fall Risk and appropriate interventions in the flowsheet.   Outcome: Progressing Towards Goal  Note: Fall Risk Interventions:       Mentation Interventions: Adequate sleep, hydration, pain control, Bed/chair exit alarm, Update white board, Toileting rounds, Reorient patient, More frequent rounding, Increase mobility    Medication Interventions: Assess postural VS orthostatic hypotension, Bed/chair exit alarm, Patient to call before getting OOB, Teach patient to arise slowly    Elimination Interventions: Bed/chair exit alarm, Call light in reach, Urinal in reach, Toileting schedule/hourly rounds, Patient to call for help with toileting needs

## 2021-02-23 NOTE — DISCHARGE SUMMARY
Hospitalist Discharge Summary     Patient ID:  Krystina Luu  [id number]  9 y.o.  1970 2/18/2021    PCP on record: Rito Rubio MD    Admit date: 2/18/2021  Discharge date and time: 2/23/2021    DISCHARGE DIAGNOSIS:  See below    CONSULTATIONS:  IP CONSULT TO HOSPITALIST  IP CONSULT TO PULMONOLOGY    Excerpted HPI from H&P of Ildefonso Gutierrez MD:      ______________________________________________________________________  DISCHARGE SUMMARY/HOSPITAL COURSE:  for full details see H&P, daily progress notes, labs, consult notes. Acute on chronic hypercarbic respiratory failure due to  COVID-19 pneumonia  COPD exacerbation  Obesity hypoventilation syndrome  Restrictive lung disease due to super morbid obesity  -Rapid SARS-CoV-2 is positive  -On arrival to ED at \[facility]\"", PCT was 79 and improving to 68.    Currently off the BiPAP and uses at bedtime.  -Continue to use BiPAP at bedtime and as needed, currently on 5-6 L  -Appreciated pulmonology consultation  -Continue remdesivir, day 4  -Continue Decadron  -Continue azithromycin  -Continue Lasix  -Continue oxygen supplementation  -BiPAP as needed  -Patient looks comfortable today-  -Monitor inflammatory markers daily  -Possible discharge tomorrow after fifth dose of remdesivir  -Oxygen requirement is close to baseline    2/23:  Pt stable for discharge home today  Can increase home O2 as needed  Completed 5 days of Remdesivir therapy  Will discharge on Decadron to complete 10 day course  Completed empiric abx  Needs to lose weight     Diabetes mellitus type 2  -Continue home Lantus at 35 units.   Continue insulin sliding scale with blood sugar checks as well  -Hold home glyburide     2/23:  Continue home meds    Super morbid obesity  Hypertension  Dyslipidemia  Diastolic congestive heart failure not in exacerbation  Coronary artery disease  Obesity hypoventilation syndrome  History of breast cancer with L2 sclerotic lesion on CT scan in December 2019.  -Continue Lasix, metoprolol, statin, aspirin, home inhalers  -Consider repeating a CT of spine given L2 sclerotic lesion since I do not see any follow-up study.  Informed her to please follow-up with primary care physician given her history of breast cancer as well.    _______________________________________________________________________  Patient seen and examined by me on discharge day. Pertinent Findings:  Gen:    Not in distress  Chest: Clear lungs  CVS:   Regular rhythm. No edema  Abd:  Soft, not distended, not tender  Neuro:  Alert, oriented x3  _______________________________________________________________________  DISCHARGE MEDICATIONS:   Current Discharge Medication List        Patient Follow Up Instructions: Activity: Social distancing with isolation for 7 days  Diet: Resume previous diet  Wound Care: None needed    Follow-up Information     Follow up With Specialties Details Why Ap Lopez MD General Practice Schedule an appointment as soon as possible for a visit in 5 days hospital follow-up.   The nurse will contact you to schedule home visit  [address]  [phone]      [facility]   [address] [place] [address] Drive [address]  [phone]        ________________________________________________________________    Risk of deterioration: Moderate    Condition at Discharge:  Stable  __________________________________________________________________    Disposition  Home with family, no needs    ____________________________________________________________________    Code Status: Full Code  ___________________________________________________________________      Total time in minutes spent coordinating this discharge (includes going over instructions, follow-up, prescriptions, and preparing report for sign off to her PCP) :  >30 minutes    Signed:  Hussein Au MD

## 2021-02-23 NOTE — PROGRESS NOTES
Transition of Care Plan:   - Home  - [facility] transport  - Follow-up Care Appointments  - DME - has home o2   - 2nd IM Letter    4:00 p.m.  CM called and confirmed that pt did appeal her d/c.      3:32 p.m. CM went over 2IM ltr and pt stated she wants to appeal d/c.  CM gave her the appeal number and gave the sheet to Jaun to give to pt. CM put [facility] on will call. 2:50 p.m. CM informed pt is being discharged. QUINN asked nurse Jaun whether pt would need a Bipap machine. She confirmed with attending dr that pt needs to follow up with her pulmonary dr.  No HH needed at this time. Pt has home oxygen already. CM will arrange transportation through [facility]. Pt discussed in rounds today. Pt at her baseline. Pt will need an outpatient sleep study.       Christine Gonzalez,   Care Management, [facility]

## 2021-02-23 NOTE — PROGRESS NOTES
1900- Bedside shift change report given to Priscilla Mcburney, RN (oncoming nurse) by Isael Rob RN (offgoing nurse). Report included the following information SBAR, Kardex, ED Summary, Intake/Output, MAR, Recent Results, Med Rec Status and Cardiac Rhythm NSR.     0700- End of Shift Note    Bedside shift change report given to  (oncoming nurse) by YONAS Anne (offgoing nurse). Report included the following information SBAR, Kardex, ED Summary, Intake/Output, MAR, Recent Results, Med Rec Status and Cardiac Rhythm NSR    Shift worked:  7p-7a     Shift summary and any significant changes:     no changes     Concerns for physician to address:       Zone phone for oncoming shift:          Activity:  Activity Level: Up with Assistance  Number times ambulated in hallways past shift: 0  Number of times OOB to chair past shift: 0    Cardiac:   Cardiac Monitoring: Yes      Cardiac Rhythm: Normal sinus rhythm    Access:   Current line(s): PIV     Genitourinary:   Urinary status: voiding    Respiratory:   O2 Device: BIPAP  Chronic home O2 use?: YES  Incentive spirometer at bedside: NO     GI:  Last Bowel Movement Date: 02/18/21  Current diet:  DIET DIABETIC WITH OPTIONS Consistent Carb 1800kcal; Regular  DIET ONE TIME MESSAGE  Passing flatus: YES  Tolerating current diet: YES  % Diet Eaten: 100 %    Pain Management:   Patient states pain is manageable on current regimen: YES    Skin:  Jeffrey Score: 19  Interventions: speciality bed, increase time out of bed and PT/OT consult    Patient Safety:  Fall Score:  Total Score: 2  Interventions: bed/chair alarm, gripper socks and pt to call before getting OOB       Length of Stay:  Expected LOS: 5d 12h  Actual LOS: Lizzette 32, GN

## 2021-02-23 NOTE — PROGRESS NOTES
Pulmonary, Critical Care, and Sleep Medicine    Patient Consult    Name: Kiran Bal MRN: [id number]   : 1970 Hospital: [facility]   Date: 2021        IMPRESSION:   · Acute/chronic hypoxic/hypercapnic respiratory failure-seems to be stable over last 24 hrs. Uses bipap at night and oxygen during day. · COVID 19 pneumonia  · GEN/OHS, decompensated - patient of Dr. Cailin Vasquez of [address] [place] [address] sleep  · COPD/asthma - no prior PFTs, does not follow with us, not clearly exacerbated, no wheezing, appear comfortable with her breathing this am.   · DM-blood sugars are reasonable. · CAD  · Chronic back pain. · HTN  · Morbid obesity  · Reviewed nursing other providers notes. Reviewed labs, CXR, orders and meds this am.       RECOMMENDATIONS:   · Continue NIV as needed and while sleeping. · Wean oxygen as tolerated. Wean to keep pox over 90% with activity   · Dexamethasone x 10 days  · Remdesivir x 5 days  · Bronchodilators   · Empiric antibiotics   · Back pain control  · Bariatric bed. · Monitor inflammatory markers  · Insulin  · DVT prophylaxis     Subjective:     21: Mask fell off last night. rogh night. This AM Sats dropped to 83% when eating on less O2. Was SOB. Now back on more O2. Wore BIPAP last night. Still with chronic back pain. No headaches, no GERD. NO sinus pain or pressure. Notes occasional spasms of back,       21: Lying on her side. Wore BIPAP last night. Still with chronic back pain. On NC oxygen this am when seen. No headaches, no GERD. NO sinus pain or pressure. Notes occasional spasms of back, ON flexeril. I met pt many years ago. Chart reviewed. Smoked for 30 yrs. 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 Father and mother ill so she quit school to help. Had twins but one never made it home. Son is now age 32. Pt weighed > 445. Now 376. Severe RAZO and unable to walk much in her home. Pt used to see. .  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 Pt lives with nephew. Pt has been on O2 since 2012. Had heart attack and sees Dr. Lizzy Westfall. Saw NP who did home visit, Fabián Davidson who is with [phone] home visiting physicians based in [facility]. Trilogy prescribed by Maycol Silver.       2-21-21: Pt was up at bedside this am. Eating her breakfast. She noted some acute on chronic back pain. She used the bipap last pm.   On NC oxygen this am when seen. No headaches, no GERD. NO sinus pain or pressure. Notes occasional spasms of back, ON flexeril. No acute issues per nursing when seen this am.     2-20: Patient seen this am. No chest pain, no back pain, Tolerating niv well last night. Has mild coughing, congestion. No leg pain, no headaches. Good appetite. This patient has been seen and evaluated at the request of Dr. Tierra Villatoro for respiratory failure. Patient is a 52 y.o. female with GEN/OHS with frequent admissions, presented with >1 week history of increased dyspnea with associated cough. She was found to be severely hypoxic and COVID testing was positive. She is now on BiPAP with improved SpO2. She reports a history of COPD, though she has never been formally tested for that.      MEDS:   Current Facility-Administered Medications   Medication    cyclobenzaprine (FLEXERIL) tablet 5 mg    albuterol (PROVENTIL HFA, VENTOLIN HFA, PROAIR HFA) inhaler 2 Puff    loratadine (CLARITIN) tablet 10 mg    enoxaparin (LOVENOX) injection 40 mg    azithromycin (ZITHROMAX) 500 mg in 0.9% sodium chloride 250 mL (VIAL-MATE)    ammonium lactate (LAC-HYDRIN) 12 % lotion    acetaminophen (TYLENOL) tablet 650 mg    ondansetron (ZOFRAN) injection 4 mg    aspirin chewable tablet 81 mg    fluticasone propionate (FLONASE) 50 mcg/actuation nasal spray 2 Spray    furosemide (LASIX) tablet 40 mg    insulin glargine (LANTUS) injection 35 Units    atorvastatin (LIPITOR) tablet 10 mg    metoprolol tartrate (LOPRESSOR) tablet 25 mg    pantoprazole (PROTONIX) tablet 40 mg    spironolactone (ALDACTONE) tablet 25 mg    dexamethasone (DECADRON) 4 mg/mL injection 6 mg    insulin lispro (HUMALOG) injection    glucose chewable tablet 16 g    dextrose (D50W) injection syrg 12.5-25 g    glucagon (GLUCAGEN) injection 1 mg        Past Medical History:   Diagnosis Date    Arthritis     Asthma     Breast lump     CAD (coronary artery disease)     Cancer (HCC)     breast cancer    Congestive heart failure (HCC)     COPD [facility]chronic obstructive [facility] disease) [facility]     Diabetes ([facility]     Hypertension     Morbid obesity with BMI of 70 and over, adult ([facility]     NSTEMI (non-ST elevated myocardial infarction) ([facility]     NSTEMI (non-ST elevated myocardial infarction) ([facility] 2012    SVT (supraventricular tachycardia) ([facility]       Past Surgical History:   Procedure Laterality Date    HX  SECTION      HX ORTHOPAEDIC      HX OTHER SURGICAL      cyst removed from back    VT CARDIAC SURG PROCEDURE UNLIST      Stents      Prior to Admission medications    Medication Sig Start Date End Date Taking? Authorizing Provider   spironolactone (ALDACTONE) 25 mg tablet Take 1 Tab by mouth daily. 20   Akshat Ying MD   cyclobenzaprine (FLEXERIL) 5 mg tablet Take 5 mg by mouth two (2) times a day. Provider, Historical   fluticasone-umeclidinium-vilanterol (Trelegy Ellipta) 100-62.5-25 mcg inhaler Take 1 Puff by inhalation daily. Provider, Historical   NovoLOG Flexpen U-100 Insulin 100 unit/mL (3 mL) inpn 5-10 Units by SubCUTAneous route as directed. Sliding scale: -300: 5 units -400 units: 10 units 20   Provider, Historical   ammonium lactate (LAC-HYDRIN) 12 % lotion Apply 1 Applicator to affected area as needed. rub in to affected area well    Provider, Historical   omeprazole (PRILOSEC) 40 mg capsule Take 40 mg by mouth daily.     Provider, Historical   nystatin (MYCOSTATIN) topical cream Apply  to affected area two (2) times a day. 9/16/19   Alesia FERREIRA MD   albuterol-ipratropium (DUO-NEB) 2.5 mg-0.5 mg/3 ml nebu 3 mL by Nebulization route four (4) times daily. 3/3/19   Nate Bailey MD   furosemide (LASIX) 40 mg tablet Take 40 mg by mouth two (2) times a day. Other, MD Landon   fluticasone (FLONASE) 50 mcg/actuation nasal spray 2 Sprays by Both Nostrils route daily. 1/6/19   Gonzalo Huff MD   insulin glargine (LANTUS) 100 unit/mL injection 35 Units by SubCUTAneous route daily. Provider, Historical   metoprolol tartrate (LOPRESSOR) 25 mg tablet Take 25 mg by mouth two (2) times a day. Provider, Historical   loratadine (CLARITIN) 10 mg tablet Take 10 mg by mouth daily. Provider, Historical   albuterol (VENTOLIN HFA) 90 mcg/actuation inhaler Take 2 Puffs by inhalation every six (6) hours as needed for Wheezing. Provider, Historical   aspirin 81 mg chewable tablet Take 81 mg by mouth daily. Provider, Historical   lovastatin (MEVACOR) 40 mg tablet Take 1 Tab by mouth nightly. 1/14/16   Chantale Peterson NP   glyBURIDE (DIABETA) 5 mg tablet Take 5 mg by mouth two (2) times daily (with meals). Provider, Historical   nitroglycerin (NITROSTAT) 0.4 mg SL tablet 1 Tab by SubLINGual route every five (5) minutes as needed for Chest Pain (call 911 if not relieved by 3).  9/12/13   Chantale Peterson NP     No Known Allergies   Social History     Tobacco Use    Smoking status: Former Smoker     Packs/day: 0.25     Types: Cigarettes    Smokeless tobacco: Never Used    Tobacco comment: Patient states \"I  aint smoking no more d*mn cigarettes after yesterday\" 01/26/2018   Substance Use Topics    Alcohol use: No      Family History   Problem Relation Age of Onset    Heart Disease Mother     Heart Disease Father     Heart Disease Sister     Breast Cancer Maternal Grandmother     Breast Cancer Paternal Grandmother           Review of Systems:  A comprehensive review of systems was negative except for that written in the HPI. Objective:   Vital Signs:    Visit Vitals  BP 90/61 (BP 1 Location: Right lower arm, BP Patient Position: At rest)   Pulse 61   Temp 97.3 °F (36.3 °C)   Resp 20   Ht 5' 7\" (1.702 m)   Wt (!) 170.6 kg (376 lb 3.2 oz)   SpO2 93%   BMI 58.92 kg/m²       O2 Device: Aerosol mask   O2 Flow Rate (L/min): 6 l/min   Temp (24hrs), Av.7 °F (36.5 °C), Min:97.3 °F (36.3 °C), Max:98 °F (36.7 °C)       Intake/Output:   Last shift:       07 -  1900  In: 240 [P.O.:240]  Out: 800 [Urine:800]  Last 3 shifts:  190 -  0700  In: 1500 [P.O.:1500]  Out: 1202 [Urine:1201]    Intake/Output Summary (Last 24 hours) at 2021 1007  Last data filed at 2021 0930  Gross per 24 hour   Intake 1240 ml   Output 1650 ml   Net -410 ml      Physical Exam:   General:  Morbidly obese, sleeping, on 7L nc with pox of 93%. Head:  Normocephalic, without obvious abnormality, atraumatic. Eyes:  Conjunctivae/corneas clear. PERRL, EOMs intact. Nose: Nares normal. Septum midline. Mucosa normal.     Throat: Lips, mucosa, and tongue normal.    Neck: Supple, symmetrical, trachea midline    Back:   Symmetric, no curvature. ROM normal.   Lungs:   Distant bilateral breath sounds, clear, breathing comfortably. Chest wall:  No tenderness or deformity. Heart:  Regular rate and rhythm    Abdomen:   Soft, non-tender. Bowel sounds normal.  Obese. Extremities: Extremities normal, atraumatic, no cyanosis or clubbing   Skin: Skin color, texture, turgor normal. Significant hirsutism. Lymph nodes: Cervical, supraclavicular nodes normal.   Neurologic: Grossly nonfocal, motor is intact.       Data review:     Recent Results (from the past 24 hour(s))   GLUCOSE, POC    Collection Time: 21 11:45 AM   Result Value Ref Range    Glucose (POC) 212 (H) 65 - 100 mg/dL    Performed by Jose Larose PCT    GLUCOSE, POC    Collection Time: 21  5:23 PM   Result Value Ref Range    Glucose (POC) 136 (H) 65 - 100 mg/dL    Performed by Tremayne James PCT    GLUCOSE, POC    Collection Time: 02/22/21 10:50 PM   Result Value Ref Range    Glucose (POC) 178 (H) 65 - 100 mg/dL    Performed by Oly Almazan    CBC W/O DIFF    Collection Time: 02/23/21  3:42 AM   Result Value Ref Range    WBC 6.7 3.6 - 11.0 K/uL    RBC 4.74 3.80 - 5.20 M/uL    HGB 13.1 11.5 - 16.0 g/dL    HCT 44.3 35.0 - 47.0 %    MCV 93.5 80.0 - 99.0 FL    MCH 27.6 26.0 - 34.0 PG    MCHC 29.6 (L) 30.0 - 36.5 g/dL    RDW 13.2 11.5 - 14.5 %    PLATELET 607 (L) 538 - 400 K/uL    MPV 12.0 8.9 - 12.9 FL    NRBC 0.0 0  WBC    ABSOLUTE NRBC 0.00 0.00 - 1.05 K/uL   METABOLIC PANEL, COMPREHENSIVE    Collection Time: 02/23/21  3:42 AM   Result Value Ref Range    Sodium 137 136 - 145 mmol/L    Potassium 5.0 3.5 - 5.1 mmol/L    Chloride 101 97 - 108 mmol/L    CO2 34 (H) 21 - 32 mmol/L    Anion gap 2 (L) 5 - 15 mmol/L    Glucose 185 (H) 65 - 100 mg/dL    BUN 22 (H) 6 - 20 MG/DL    Creatinine 0.92 0.55 - 1.02 MG/DL    BUN/Creatinine ratio 24 (H) 12 - 20      GFR est AA >60 >60 ml/min/1.73m2    GFR est non-AA >60 >60 ml/min/1.73m2    Calcium 10.1 8.5 - 10.1 MG/DL    Bilirubin, total 0.3 0.2 - 1.0 MG/DL    ALT (SGPT) 47 12 - 78 U/L    AST (SGOT) 29 15 - 37 U/L    Alk. phosphatase 86 45 - 117 U/L    Protein, total 7.6 6.4 - 8.2 g/dL    Albumin 3.0 (L) 3.5 - 5.0 g/dL    Globulin 4.6 (H) 2.0 - 4.0 g/dL    A-G Ratio 0.7 (L) 1.1 - 2.2     GLUCOSE, POC    Collection Time: 02/23/21  8:03 AM   Result Value Ref Range    Glucose (POC) 162 (H) 65 - 100 mg/dL    Performed by Abigail Grover        Imaging:  I have personally reviewed the patients radiographs and have reviewed the reports:  2-18-21: IMPRESSION  1. Enlarged cardiac silhouette.  Prominence of the right hilum is felt to be  technical. When the patient can tolerate recommend PA and lateral with good  inspiration otherwise no acute abnormality, I agree with radiology interpretation Gomez Mantilla MD

## 2021-02-23 NOTE — PROGRESS NOTES
0700: Bedside shift change report given to Deven Watkins RN (oncoming nurse) by Aleks Silva RN (offgoing nurse). Report included the following information SBAR, Kardex, Intake/Output, MAR and Recent Results. 0700: Anai Butler, nursing student will be documenting on patient. 1250: Patient calling out stating her IV ABX are burning. I flushed patient's IV and it flushed great and no s/s of infiltration. Patient got very upset yelling at nursing staff to stop IV immediatly. I educated patient on how important it is to finish the ABX and she is still yelling stating she wants if off now. Patient also threatening to pull her IV out if I don't turn it off. I offered to start a new IV on patient to see if it will help with the burning and she refused that as well. Will try to get patient to finish dose of ABX after she calms down. 1319: Patient's IV still flushing okay, but she si complaining of pain at the site and she wants it removed. I removed PIV at this time per patient request- saying \"I will take it out myself if you don't\". I attempted to get new access on patient, but she states she has been stuck so many times she does not even want me to try, she just wants PICC team to come. PICC team called at this time, VM left. 1450: Dr. Pulliam Comment at bedside and patient is back down to baseline O2 and has completed IV ABX and IV remdesivir, will place d/c order. Patient will need [facility] transport, CM made aware. 1455: PICC team called to be made aware of patient d/c and no need to place PIV. 32 61 16: QUINN states patient is appealing her d/c because she feels she is not stable enough to go home. Patient is on 4L NC (her baseline is 5L) with O2 saturation of 92% and BP and HR are stable. Vitals updated in flow sheets. Dr. Pulliam Comment made aware. If patient is not leaving tonight will have to call PICC team back to get access on patient since she made me take her IV out earlier today.      1625: PICC team called and we discussed if it was necessary for patient to have access since she was stable enough to go home. Dr. Deysi Pelaez was messaged and he is okay for patient to have no IV access for now. Will re-address tomorrow. PICC team made aware.     1900: I have reviewed and agree with Cora Cuadra student documentation.

## 2021-02-23 NOTE — INTERDISCIPLINARY ROUNDS
Interdisciplinary team rounds were held 2/23/2021 with the following team members:Care Management, Nursing, Nutrition, Pharmacy, Physician and Clinical Coordinator. Plan of care discussed. See clinical pathway and/or care plan for interventions and desired outcomes. Patient to receive final dose of IV remdesivir today and discharge this afternoon.

## 2021-02-23 NOTE — PROGRESS NOTES
0700: Bedside shift change report given to Robina Monzon RN (oncoming nurse) and savannah Cristina nurse by Sangeeta New RN (offgoing nurse). Report included the following information SBAR, Kardex, Intake/Output, MAR and Recent Results. 0700: savannah Cristina nurse will be documenting on patient today. 1045: Discussed patient in IDR rounds today. Patient back to baseline 5LNC. Possible d/c today. Dr. Carlie Amado will see patient. 1440: Dr. Carlie Amado at beside and assessing patient at this time. Verbal order per MD to discharge patient today. Will call case management to set up transportation arrangements. 1900: Bedside shift change report given to Sangeeta New RN (oncoming nurse) by Robina Monzon RN (offgoing nurse) and savannah Cristina nurse. Report included the following information SBAR, Kardex, Intake/Output, MAR and Recent Results. I do not have access to end of shift note.

## 2021-02-24 VITALS
TEMPERATURE: 97.7 F | BODY MASS INDEX: 45.99 KG/M2 | DIASTOLIC BLOOD PRESSURE: 82 MMHG | WEIGHT: 293 LBS | SYSTOLIC BLOOD PRESSURE: 101 MMHG | RESPIRATION RATE: 20 BRPM | HEART RATE: 69 BPM | HEIGHT: 67 IN | OXYGEN SATURATION: 94 %

## 2021-02-24 LAB
ALBUMIN SERPL-MCNC: 3 G/DL (ref 3.5–5)
ALBUMIN/GLOB SERPL: 0.7 {RATIO} (ref 1.1–2.2)
ALP SERPL-CCNC: 83 U/L (ref 45–117)
ALT SERPL-CCNC: 49 U/L (ref 12–78)
ANION GAP SERPL CALC-SCNC: 2 MMOL/L (ref 5–15)
AST SERPL-CCNC: 24 U/L (ref 15–37)
BILIRUB SERPL-MCNC: 0.3 MG/DL (ref 0.2–1)
BUN SERPL-MCNC: 21 MG/DL (ref 6–20)
BUN/CREAT SERPL: 27 (ref 12–20)
CALCIUM SERPL-MCNC: 10.6 MG/DL (ref 8.5–10.1)
CHLORIDE SERPL-SCNC: 102 MMOL/L (ref 97–108)
CO2 SERPL-SCNC: 34 MMOL/L (ref 21–32)
CREAT SERPL-MCNC: 0.77 MG/DL (ref 0.55–1.02)
CRP SERPL-MCNC: <0.29 MG/DL (ref 0–0.6)
ERYTHROCYTE [DISTWIDTH] IN BLOOD BY AUTOMATED COUNT: 13.2 % (ref 11.5–14.5)
GLOBULIN SER CALC-MCNC: 4.5 G/DL (ref 2–4)
GLUCOSE BLD STRIP.AUTO-MCNC: 105 MG/DL (ref 65–100)
GLUCOSE BLD STRIP.AUTO-MCNC: 111 MG/DL (ref 65–100)
GLUCOSE BLD STRIP.AUTO-MCNC: 153 MG/DL (ref 65–100)
GLUCOSE SERPL-MCNC: 127 MG/DL (ref 65–100)
HCT VFR BLD AUTO: 43.9 % (ref 35–47)
HGB BLD-MCNC: 13.1 G/DL (ref 11.5–16)
MCH RBC QN AUTO: 27.4 PG (ref 26–34)
MCHC RBC AUTO-ENTMCNC: 29.8 G/DL (ref 30–36.5)
MCV RBC AUTO: 91.8 FL (ref 80–99)
NRBC # BLD: 0 K/UL (ref 0–0.01)
NRBC BLD-RTO: 0 PER 100 WBC
PLATELET # BLD AUTO: 157 K/UL (ref 150–400)
PMV BLD AUTO: 11 FL (ref 8.9–12.9)
POTASSIUM SERPL-SCNC: 4 MMOL/L (ref 3.5–5.1)
PROT SERPL-MCNC: 7.5 G/DL (ref 6.4–8.2)
RBC # BLD AUTO: 4.78 M/UL (ref 3.8–5.2)
SERVICE CMNT-IMP: ABNORMAL
SODIUM SERPL-SCNC: 138 MMOL/L (ref 136–145)
WBC # BLD AUTO: 4.8 K/UL (ref 3.6–11)

## 2021-02-24 PROCEDURE — 77010033678 HC OXYGEN DAILY

## 2021-02-24 PROCEDURE — 86140 C-REACTIVE PROTEIN: CPT

## 2021-02-24 PROCEDURE — 74011250637 HC RX REV CODE- 250/637: Performed by: INTERNAL MEDICINE

## 2021-02-24 PROCEDURE — 80053 COMPREHEN METABOLIC PANEL: CPT

## 2021-02-24 PROCEDURE — 74011636637 HC RX REV CODE- 636/637: Performed by: INTERNAL MEDICINE

## 2021-02-24 PROCEDURE — 36415 COLL VENOUS BLD VENIPUNCTURE: CPT

## 2021-02-24 PROCEDURE — 85027 COMPLETE CBC AUTOMATED: CPT

## 2021-02-24 PROCEDURE — 74011250636 HC RX REV CODE- 250/636: Performed by: INTERNAL MEDICINE

## 2021-02-24 PROCEDURE — 82962 GLUCOSE BLOOD TEST: CPT

## 2021-02-24 PROCEDURE — 94660 CPAP INITIATION&MGMT: CPT

## 2021-02-24 PROCEDURE — 94640 AIRWAY INHALATION TREATMENT: CPT

## 2021-02-24 RX ADMIN — CYCLOBENZAPRINE 5 MG: 10 TABLET, FILM COATED ORAL at 12:28

## 2021-02-24 RX ADMIN — ALBUTEROL SULFATE 2 PUFF: 90 AEROSOL, METERED RESPIRATORY (INHALATION) at 14:51

## 2021-02-24 RX ADMIN — METOPROLOL TARTRATE 25 MG: 25 TABLET, FILM COATED ORAL at 08:40

## 2021-02-24 RX ADMIN — LORATADINE 10 MG: 10 TABLET ORAL at 08:38

## 2021-02-24 RX ADMIN — PANTOPRAZOLE SODIUM 40 MG: 40 TABLET, DELAYED RELEASE ORAL at 08:38

## 2021-02-24 RX ADMIN — ENOXAPARIN SODIUM 40 MG: 40 INJECTION SUBCUTANEOUS at 08:38

## 2021-02-24 RX ADMIN — FLUTICASONE PROPIONATE 2 SPRAY: 50 SPRAY, METERED NASAL at 08:40

## 2021-02-24 RX ADMIN — METOPROLOL TARTRATE 25 MG: 25 TABLET, FILM COATED ORAL at 17:35

## 2021-02-24 RX ADMIN — FUROSEMIDE 40 MG: 40 TABLET ORAL at 17:35

## 2021-02-24 RX ADMIN — INSULIN LISPRO 2 UNITS: 100 INJECTION, SOLUTION INTRAVENOUS; SUBCUTANEOUS at 11:46

## 2021-02-24 RX ADMIN — SPIRONOLACTONE 25 MG: 25 TABLET ORAL at 08:41

## 2021-02-24 RX ADMIN — INSULIN GLARGINE 35 UNITS: 100 INJECTION, SOLUTION SUBCUTANEOUS at 08:38

## 2021-02-24 RX ADMIN — ATORVASTATIN CALCIUM 10 MG: 10 TABLET, FILM COATED ORAL at 08:38

## 2021-02-24 RX ADMIN — ALBUTEROL SULFATE 2 PUFF: 90 AEROSOL, METERED RESPIRATORY (INHALATION) at 07:55

## 2021-02-24 RX ADMIN — FUROSEMIDE 40 MG: 40 TABLET ORAL at 08:41

## 2021-02-24 RX ADMIN — ALBUTEROL SULFATE 2 PUFF: 90 AEROSOL, METERED RESPIRATORY (INHALATION) at 19:17

## 2021-02-24 RX ADMIN — ASPIRIN 81 MG: 81 TABLET, CHEWABLE ORAL at 08:38

## 2021-02-24 NOTE — PROGRESS NOTES
0700: Bedside shift change report given to Alejandra Boyce RN (oncoming nurse) by Aleks Silva RN (offgoing nurse). Report included the following information SBAR, Kardex, Intake/Output, MAR and Recent Results. 0700: Patient currently on baseline O2- 5LNC, will monitor. 1030: Discussed patient in IDRs. Dr. Pulliam Comment wants to keep d/c order and will see what appeal does. Also, MD still okay with patient having no access. 1430: Spoke with CM, patient will d/c today. She will get her [facility] transport set up, time pending. 1615: Confirmed with CM that patient will be transported at 2000. D/C paperwork printed and discussed with patient. 1800: End of Shift Note    Bedside shift change report given to Lizette Hemphill RN (oncoming nurse) by Luis Beverly (offgoing nurse). Report included the following information SBAR, Kardex, Intake/Output, MAR, Recent Results and Med Rec Status    Shift worked:  [id number]     Shift summary and any significant changes:     pt being d/c, transportation at 2000     Concerns for physician to address:  n/a     Zone phone for oncoming shift:   XXX       Activity:  Activity Level: Up with Assistance  Number times ambulated in hallways past shift: 0  Number of times OOB to chair past shift: 1    Cardiac:   Cardiac Monitoring: Yes      Cardiac Rhythm: Normal sinus rhythm    Access:   Current line(s): no access     Genitourinary:   Urinary status: voiding    Respiratory:   O2 Device: Nasal cannula  Chronic home O2 use?: YES  Incentive spirometer at bedside: YES     GI:  Last Bowel Movement Date: 02/23/21  Current diet:  DIET DIABETIC WITH OPTIONS Consistent Carb 1800kcal; Regular  DIET ONE TIME MESSAGE  Passing flatus: YES  Tolerating current diet: YES  % Diet Eaten: 100 %    Pain Management:   Patient states pain is manageable on current regimen: YES    Skin:  Jeffrey Score: 19  Interventions: PT/OT consult    Patient Safety:  Fall Score:  Total Score: 2  Interventions: pt to call before getting OOB       Length of Stay:  Expected LOS: 5d 12h  Actual LOS: [facility]

## 2021-02-24 NOTE — PROGRESS NOTES
Spiritual Care Assessment/Progress Note  [facility]      NAME: Abdiel New      MRN: [id number]  AGE: 52 y.o.  SEX: female  Zoroastrianism Affiliation: Yarsani   Language: English     2/24/2021     Total Time (in minutes): 12     Spiritual Assessment begun in [facility] PROGRESSIVE CARE through conversation with:         [x]Patient        [] Family    [] Friend(s)        Reason for Consult: Initial/Spiritual assessment, patient floor     Spiritual beliefs: (Please include comment if needed)     [x] Identifies with a daryn tradition:         [] Supported by a daryn community:            [] Claims no spiritual orientation:           [] Seeking spiritual identity:                [] Adheres to an individual form of spirituality:           [] Not able to assess:                           Identified resources for coping:      [x] Prayer                               [] Music                  [] Guided Imagery     [x] Family/friends                 [] Pet visits     [] Devotional reading                         [] Unknown     [] Other:                                               Interventions offered during this visit: (See comments for more details)    Patient Interventions: Affirmation of emotions/emotional suffering, Affirmation of daryn, Catharsis/review of pertinent events in supportive environment, Coping skills reviewed/reinforced, Iconic (affirming the presence of God/Higher Power), Initial/Spiritual assessment, patient floor, Life review/legacy, Normalization of emotional/spiritual concerns, Prayer (assurance of)           Plan of Care:     [] Support spiritual and/or cultural needs    [] Support AMD and/or advance care planning process      [] Support grieving process   [] Coordinate Rites and/or Rituals    [] Coordination with community clergy   [x] No spiritual needs identified at this time   [] Detailed Plan of Care below (See Comments)  [] Make referral to Music Therapy  [] Make referral to Pet Therapy     [] Make referral to Addiction services  [] Make referral to [facility]  [] Make referral to Spiritual Care Partner  [] No future visits requested        [x] Follow up upon further referrals     Comments: Provided initial spiritual assessment for pt Cherri Delaney on [address] [place] [address]. Due to Covid-19 protocols, pt visit took place through telephone. Consult with bedside RN and chart review occurred prior to visit. Affirmed pt perception of physical improvement and for supports of family through phone calls. Pt expressed uncertainty about discharge, but mentioned feeling supported. Pt thanked spiritual care for checking in and expressed no particular needs at this time. Assured of prayers and advised of  services.     Belinda 1 NORMA Nathan 1 Provider   Paging Service [phone])

## 2021-02-24 NOTE — PROGRESS NOTES
1900- Bedside shift change report given to Cristiane Bynum RN (oncoming nurse) by Ludwin Hernandez RN (offgoing nurse). Report included the following information SBAR, Kardex, ED Summary, Intake/Output, MAR, Recent Results, Med Rec Status and Cardiac Rhythm NSR.     0700- End of Shift Note    Bedside shift change report given to Ludwin Hernandez RN (oncoming nurse) by YONAS De Leon (offgoing nurse). Report included the following information SBAR, Kardex, ED Summary, Intake/Output, MAR, Recent Results, Med Rec Status and Cardiac Rhythm NSR    Shift worked:  7p-7a     Shift summary and any significant changes:     Pt maintained sats >95 on 5L NC. No changes     Concerns for physician to address:       Zone phone for oncoming shift:          Activity:  Activity Level: Up with Assistance  Number times ambulated in hallways past shift: 0  Number of times OOB to chair past shift: 0    Cardiac:   Cardiac Monitoring: Yes      Cardiac Rhythm: Normal sinus rhythm    Access:   Current line(s): No access     Genitourinary:   Urinary status: voiding    Respiratory:   O2 Device: BIPAP  Chronic home O2 use?: YES  Incentive spirometer at bedside: NO     GI:  Last Bowel Movement Date: 02/23/21  Current diet:  DIET DIABETIC WITH OPTIONS Consistent Carb 1800kcal; Regular  DIET ONE TIME MESSAGE  Passing flatus: YES  Tolerating current diet: YES  % Diet Eaten: 100 %    Pain Management:   Patient states pain is manageable on current regimen: YES    Skin:  Jeffrey Score: 19  Interventions: speciality bed, increase time out of bed and PT/OT consult    Patient Safety:  Fall Score:  Total Score: 2  Interventions: bed/chair alarm, gripper socks and pt to call before getting OOB       Length of Stay:  Expected LOS: 5d 12h  Actual LOS: [address], [place]

## 2021-02-24 NOTE — PROGRESS NOTES
Transition of Care Plan:    Disposition: Home  Follow up appointments: Dr. Shane Nesbitt nurse will contact pt to schedule appointment  DME needed: None - Has Home O2  Transportation at Discharge: [facility] to transport  Keys or means to access home:    Yes, pt stated will call to make arrangements to get in the home. IM Medicare letter: 2nd  Letter given  Caregiver Contact: Ebonie Fine [phone]  Discharge Caregiver contacted prior to discharge? CM attempted to reach put to caregiver. No Answer and voicemail not set up to leave message. 3:24pm  [facility] pt at 2015. CM called to inform pt. Pt requires additional crew due to     2:51  CM updated pt on transportation time that is pending. 2:23  CM discussed d/c plan with pt. CM shared with pt that she has been cleared for d/c. Pt agreed to d/c home to today. CM arrange [facility] to transport pt. -transport time pending. CM offered [facility] services. Pt declined HH at this time. 1217  QUNIN called Myke Gleason [phone] to follow up on appeal that pt stated was placed on yesterday. Per Myke Gleason- no appeal was placed, there was no record of appeal being placed. QUINN discussed d/c plan with pt and informed her that per Myke Gleason - there was no record of an appeal.   Per pt, she stated she called and spoke with some one and provided them with information. QUINN perfect serve physician- per Dr. Maryjo Laird pt is cleared for d/c.       [address]  Phone: [phone]

## 2021-02-24 NOTE — PROGRESS NOTES
Pulmonary, Critical Care, and Sleep Medicine    Patient Consult    Name: Kiran Bal MRN: [id number]   : 1970 Hospital: [facility]   Date: 2021        IMPRESSION:   · Acute/chronic hypoxic/hypercapnic respiratory failure-seems to be stable over last 24 hrs. · COVID 19 pneumonia  · GEN/OHS, decompensated - patient of Dr. Cailin Vasquez of [facility] sleep  · COPD/asthma - no prior PFTs, does not follow with us, not clearly exacerbated, no wheezing, appear comfortable with her breathing this am.   · DM-blood sugars are reasonable. · CAD  · Chronic back pain. · HTN  · Morbid obesity  · Reviewed nursing other providers notes. Reviewed labs, CXR, orders and meds this am.       RECOMMENDATIONS:   · D/c per team with 4LPM  · Should get [facility] followup  · F/u Dr. Cailin Vasquez for Trilogy  · F/u PCP     Subjective:     21: d/c yesterday appealed. Rory in mid 90's on 4 LPM all day. This AM 88% but better with deep breathes. Has concentrator at home to max 6 LPM. Follows with dr. Cailin Vasquez. NO headaches, no GERD. NO sinus pain or pressure. 21: Mask fell off last night. rogh night. This AM Sats dropped to 83% when eating on less O2. Was SOB. Now back on more O2. Wore BIPAP last night. Still with chronic back pain. No headaches, no GERD. NO sinus pain or pressure. Notes occasional spasms of back,       21: Lying on her side. Wore BIPAP last night. Still with chronic back pain. On [place] oxygen this am when seen. No headaches, no GERD. NO sinus pain or pressure. Notes occasional spasms of back, ON flexeril. I met pt many years ago. Chart reviewed. Smoked for 30 yrs. 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 Father and mother ill so she quit school to help. Had twins but one never made it home. Son is now age 32. Pt weighed > 445. Now 376. Severe RAZO and unable to walk much in her home. Pt used to see. .  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 Pt lives with nephew. Pt has been on O2 since 2012. Had heart attack and sees Dr. Gonzalo Montenegro. Saw NP who did home visit, Frantz Connell who is with [phone] home visiting physicians based in [facility]. Trilogy prescribed by Blue Garcia.       2-21-21: Pt was up at bedside this am. Eating her breakfast. She noted some acute on chronic back pain. She used the bipap last pm.   On [place] oxygen this am when seen. No headaches, no GERD. NO sinus pain or pressure. Notes occasional spasms of back, ON flexeril. No acute issues per nursing when seen this am.     2-20: Patient seen this am. No chest pain, no back pain, Tolerating niv well last night. Has mild coughing, congestion. No leg pain, no headaches. Good appetite. This patient has been seen and evaluated at the request of Dr. Dong Pate for respiratory failure. Patient is a 52 y.o. female with GEN/OHS with frequent admissions, presented with >1 week history of increased dyspnea with associated cough. She was found to be severely hypoxic and COVID testing was positive. She is now on BiPAP with improved SpO2. She reports a history of COPD, though she has never been formally tested for that.      MEDS:   Current Facility-Administered Medications   Medication    cyclobenzaprine (FLEXERIL) tablet 5 mg    albuterol (PROVENTIL HFA, VENTOLIN HFA, PROAIR HFA) inhaler 2 Puff    loratadine (CLARITIN) tablet 10 mg    enoxaparin (LOVENOX) injection 40 mg    ammonium lactate (LAC-HYDRIN) 12 % lotion    acetaminophen (TYLENOL) tablet 650 mg    ondansetron (ZOFRAN) injection 4 mg    aspirin chewable tablet 81 mg    fluticasone propionate (FLONASE) 50 mcg/actuation nasal spray 2 Spray    furosemide (LASIX) tablet 40 mg    insulin glargine (LANTUS) injection 35 Units    atorvastatin (LIPITOR) tablet 10 mg    metoprolol tartrate (LOPRESSOR) tablet 25 mg    pantoprazole (PROTONIX) tablet 40 mg    spironolactone (ALDACTONE) tablet 25 mg    dexamethasone (DECADRON) 4 mg/mL injection 6 mg    insulin lispro (HUMALOG) injection    glucose chewable tablet 16 g    dextrose (D50W) injection syrg 12.5-25 g    glucagon (GLUCAGEN) injection 1 mg        Past Medical History:   Diagnosis Date    Arthritis     Asthma     Breast lump     CAD (coronary artery disease)     Cancer (HCC)     breast cancer    Congestive heart failure (HCC)     COPD (chronic obstructive pulmonary disease) ([facility]     Diabetes [facility]     Hypertension     Morbid obesity with BMI of 70 and over, adult ([facility]     NSTEMI (non-ST elevated myocardial infarction) [facility]     NSTEMI (non-ST elevated myocardial infarction) ([facility] 2012    SVT (supraventricular tachycardia) ([facility])       Past Surgical History:   Procedure Laterality Date    HX  SECTION      HX ORTHOPAEDIC      HX OTHER SURGICAL      cyst removed from back    DE CARDIAC SURG PROCEDURE UNLIST      Stents      Prior to Admission medications    Medication Sig Start Date End Date Taking? Authorizing Provider   albuterol (Ventolin HFA) 90 mcg/actuation inhaler Take 2 Puffs by inhalation every six (6) hours as needed for Wheezing. 21  Yes Radha Talbert MD   dexAMETHasone (DECADRON) 6 mg tablet Take 1 Tab by mouth Daily (before breakfast) for 4 days. 21 Yes Radha Talbert MD   spironolactone (ALDACTONE) 25 mg tablet Take 1 Tab by mouth daily. 20   Vignesh Blackman MD   cyclobenzaprine (FLEXERIL) 5 mg tablet Take 5 mg by mouth two (2) times a day. Provider, Historical   fluticasone-umeclidinium-vilanterol (Trelegy Ellipta) 100-62.5-25 mcg inhaler Take 1 Puff by inhalation daily. Provider, Historical   NovoLOG Flexpen U-100 Insulin 100 unit/mL (3 mL) inpn 5-10 Units by SubCUTAneous route as directed.  Sliding scale: -300: 5 units -400 units: 10 units 20   Provider, Historical   ammonium lactate (LAC-HYDRIN) 12 % lotion Apply 1 Applicator to affected area as needed. rub in to affected area well    Provider, Historical   omeprazole (PRILOSEC) 40 mg capsule Take 40 mg by mouth daily. Provider, Historical   nystatin (MYCOSTATIN) topical cream Apply  to affected area two (2) times a day. 9/16/19   Gilberto FERREIRA MD   albuterol-ipratropium (DUO-NEB) 2.5 mg-0.5 mg/3 ml nebu 3 mL by Nebulization route four (4) times daily. 3/3/19   Nereida Martinez MD   furosemide (LASIX) 40 mg tablet Take 40 mg by mouth two (2) times a day. Other, MD Landon   fluticasone (FLONASE) 50 mcg/actuation nasal spray 2 Sprays by Both Nostrils route daily. 1/6/19   Frandy Bob MD   insulin glargine (LANTUS) 100 unit/mL injection 35 Units by SubCUTAneous route daily. Provider, Historical   metoprolol tartrate (LOPRESSOR) 25 mg tablet Take 25 mg by mouth two (2) times a day. Provider, Historical   loratadine (CLARITIN) 10 mg tablet Take 10 mg by mouth daily. Provider, Historical   aspirin 81 mg chewable tablet Take 81 mg by mouth daily. Provider, Historical   lovastatin (MEVACOR) 40 mg tablet Take 1 Tab by mouth nightly. 1/14/16   Tal Coon NP   glyBURIDE (DIABETA) 5 mg tablet Take 5 mg by mouth two (2) times daily (with meals). Provider, Historical   nitroglycerin (NITROSTAT) 0.4 mg SL tablet 1 Tab by SubLINGual route every five (5) minutes as needed for Chest Pain (call 911 if not relieved by 3).  9/12/13   Tal Coon NP     No Known Allergies   Social History     Tobacco Use    Smoking status: Former Smoker     Packs/day: 0.25     Types: Cigarettes    Smokeless tobacco: Never Used    Tobacco comment: Patient states \"I  aint smoking no more d*mn cigarettes after yesterday\" 01/26/2018   Substance Use Topics    Alcohol use: No      Family History   Problem Relation Age of Onset    Heart Disease Mother     Heart Disease Father     Heart Disease Sister     Breast Cancer Maternal Grandmother     Breast Cancer Paternal Grandmother           Review of Systems:  A comprehensive review of systems was negative except for that written in the HPI. Objective:   Vital Signs:    Visit Vitals  /84   Pulse 84   Temp 97.3 °F (36.3 °C)   Resp 14   Ht 5' 7\" (1.702 m)   Wt (!) 170.6 kg (376 lb 3.2 oz)   SpO2 90%   BMI 58.92 kg/m²       O2 Device: Nasal cannula   O2 Flow Rate (L/min): 5 l/min   Temp (24hrs), Av.8 °F (36.6 °C), Min:97.3 °F (36.3 °C), Max:98.1 °F (36.7 °C)       Intake/Output:   Last shift:       07 -  1900  In: 240 [P.O.:240]  Out: -   Last 3 shifts: 1901 -  0700  In: 2862 [P.O.:720; I.V.:1000]  Out: 3270 [Urine:3270]    Intake/Output Summary (Last 24 hours) at 2021 1100  Last data filed at 2021 0739  Gross per 24 hour   Intake 1220 ml   Output 1620 ml   Net -400 ml      Physical Exam:   General:  Morbidly obese, sleeping, on 7L nc with pox of 93%. Head:  Normocephalic, without obvious abnormality, atraumatic. Eyes:  Conjunctivae/corneas clear. PERRL, EOMs intact. Nose: Nares normal. Septum midline. Mucosa normal.     Throat: Lips, mucosa, and tongue normal.    Neck: Supple, symmetrical, trachea midline    Back:   Symmetric, no curvature. ROM normal.   Lungs:   Distant bilateral breath sounds, clear, breathing comfortably. Chest wall:  No tenderness or deformity. Heart:  Regular rate and rhythm    Abdomen:   Soft, non-tender. Bowel sounds normal.  Obese. Extremities: Extremities normal, atraumatic, no cyanosis or clubbing   Skin: Skin color, texture, turgor normal. Significant hirsutism. Lymph nodes: Cervical, supraclavicular nodes normal.   Neurologic: Grossly nonfocal, motor is intact.       Data review:     Recent Results (from the past 24 hour(s))   GLUCOSE, POC    Collection Time: 21 11:05 AM   Result Value Ref Range    Glucose (POC) 229 (H) 65 - 100 mg/dL    Performed by [address] [place] [address], [facility]    Collection Time: 21  3:39 PM   Result Value Ref Range    Glucose (POC) 140 (H) 65 - 100 mg/dL    Performed by Nathan Cárdenas, POC    Collection Time: 02/23/21 10:29 PM   Result Value Ref Range    Glucose (POC) 191 (H) 65 - 100 mg/dL    Performed by Radha Mendoza    C REACTIVE PROTEIN, QT    Collection Time: 02/24/21  5:28 AM   Result Value Ref Range    C-Reactive protein <0.29 0.00 - 0.60 mg/dL   CBC W/O DIFF    Collection Time: 02/24/21  5:28 AM   Result Value Ref Range    WBC 4.8 3.6 - 11.0 K/uL    RBC 4.78 3.80 - 5.20 M/uL    HGB 13.1 11.5 - 16.0 g/dL    HCT 43.9 35.0 - 47.0 %    MCV 91.8 80.0 - 99.0 FL    MCH 27.4 26.0 - 34.0 PG    MCHC 29.8 (L) 30.0 - 36.5 g/dL    RDW 13.2 11.5 - 14.5 %    PLATELET 499 437 - 538 K/uL    MPV 11.0 8.9 - 12.9 FL    NRBC 0.0 0  WBC    ABSOLUTE NRBC 0.00 0.00 - 4.96 K/uL   METABOLIC PANEL, COMPREHENSIVE    Collection Time: 02/24/21  5:28 AM   Result Value Ref Range    Sodium 138 136 - 145 mmol/L    Potassium 4.0 3.5 - 5.1 mmol/L    Chloride 102 97 - 108 mmol/L    CO2 34 (H) 21 - 32 mmol/L    Anion gap 2 (L) 5 - 15 mmol/L    Glucose 127 (H) 65 - 100 mg/dL    BUN 21 (H) 6 - 20 MG/DL    Creatinine 0.77 0.55 - 1.02 MG/DL    BUN/Creatinine ratio 27 (H) 12 - 20      GFR est AA >60 >60 ml/min/1.73m2    GFR est non-AA >60 >60 ml/min/1.73m2    Calcium 10.6 (H) 8.5 - 10.1 MG/DL    Bilirubin, total 0.3 0.2 - 1.0 MG/DL    ALT (SGPT) 49 12 - 78 U/L    AST (SGOT) 24 15 - 37 U/L    Alk. phosphatase 83 45 - 117 U/L    Protein, total 7.5 6.4 - 8.2 g/dL    Albumin 3.0 (L) 3.5 - 5.0 g/dL    Globulin 4.5 (H) 2.0 - 4.0 g/dL    A-G Ratio 0.7 (L) 1.1 - 2.2     GLUCOSE, POC    Collection Time: 02/24/21  8:06 AM   Result Value Ref Range    Glucose (POC) 105 (H) 65 - 100 mg/dL    Performed by Lizzie Ortiz (PCT)        Imaging:  I have personally reviewed the patients radiographs and have reviewed the reports:  2-18-21: IMPRESSION  1. Enlarged cardiac silhouette.  Prominence of the right hilum is felt to be  technical. When the patient can tolerate recommend PA and lateral with good  inspiration otherwise no acute abnormality, I agree with radiology interpretation         Chloe John MD

## 2021-02-25 ENCOUNTER — PATIENT OUTREACH (OUTPATIENT)
Dept: CASE MANAGEMENT | Age: 51
End: 2021-02-25

## 2021-02-25 NOTE — PROGRESS NOTES
No transition of care outreach indicated due to patient being treated by [address] [place] [address].

## 2021-02-25 NOTE — PROGRESS NOTES
Patient contacted regarding [id number] diagnosis\". Discussed COVID-19 related testing which was available at this time. Test results were positive. Patient informed of results, if available? yes     Care Transition Nurse/ Ambulatory Care Manager contacted the patient by telephone to perform post discharge assessment. Call within 2 business days of discharge: Yes Verified name and  with patient as identifiers. Provided introduction to self, and explanation of the CTN/ACM role, and reason for call due to risk factors for infection and/or exposure to COVID-19. Symptoms reviewed with patient who verbalized the following symptoms: weakness      Due to no new or worsening symptoms encounter was not routed to provider for escalation. Discussed follow-up appointments. If no appointment was previously scheduled, appointment scheduling offered: [facility] follow up appointment(s): No future appointments. Non-[facility] follow up appointment(s):urged to call PCP     Advance Care Planning:   Does patient have an Advance Directive:  reviewed and current. Patient has following risk factors of: heart failure, COPD and diabetes. CTN/ACM reviewed discharge instructions, medical action plan and red flags such as increased shortness of breath, increasing fever and signs of decompensation with patient who verbalized understanding. Discussed exposure protocols and quarantine with CDC Guidelines What to do if you are sick with coronavirus disease .  Patient was given an opportunity for questions and concerns. The patient agrees to contact the [facility] exposure line [phone], [facility] [address]  ([phone] and PCP office for questions related to their healthcare. CTN/ACM provided contact information for future needs.     Reviewed and educated patient on any new and changed medications related to discharge diagnosis     Patient/family/caregiver given information for Tram[facility] Block and agrees to enroll no  Covid 14 day call based on severity of symptoms and risk factors.

## 2021-02-25 NOTE — PROGRESS NOTES
1900 - Bedside and Verbal shift change report given to Delta Wolfe RN (oncoming nurse) by Gómez Knowles RN (offgoing nurse). Report included the following information SBAR, Kardex, Intake/Output, MAR, Recent Results and Cardiac Rhythm NSR. Pt resting in bed. Awaiting d/c with [facility] at 2000.    2000 - VSS. Denies any pain/discomfort. Call bell within reach. 2048 - [facility] arrived for transport, pt has d/c paperwork, report given to [facility] transport, pt reports having her O2 ready at home. Pt d/c on 5L O2 (baseline).

## 2021-03-10 ENCOUNTER — PATIENT OUTREACH (OUTPATIENT)
Dept: CASE MANAGEMENT | Age: 51
End: 2021-03-10

## 2021-08-03 PROBLEM — J18.9 PNA (PNEUMONIA): Status: RESOLVED | Noted: 2018-09-24 | Resolved: 2021-08-03

## 2021-10-13 ENCOUNTER — TELEPHONE (OUTPATIENT)
Dept: SURGERY | Age: 51
End: 2021-10-13

## 2021-10-13 NOTE — TELEPHONE ENCOUNTER
C/o right breast pain but Patient states it is not leaking on the outside. Then all of a sudden her face and head starting hurting. She had trouble breathing then all of a sudden it was like something bursted and felt better then about a hour later the whole breast started hurting again. She can still feel the knot to the right right breast.  Made aware she needs to come in for an appt.

## 2022-01-29 NOTE — ED NOTES
Chief complaint:   Chief Complaint   Patient presents with   • Sinus Problem     x1 day sore throat, congestion,    • Eye Problem     x1 day left eye red and swollen feels a little sore at times        Vitals:  Visit Vitals  /71 (BP Location: RUE - Right upper extremity, Patient Position: Sitting, Cuff Size: Small Adult)   Pulse 108   Temp 99.9 °F (37.7 °C) (Temporal)   Resp 18   Ht 5' 0.5\" (1.537 m)   Wt 39.2 kg (86 lb 6.4 oz)   SpO2 98%   BMI 16.60 kg/m²       HISTORY OF PRESENT ILLNESS     Nils presents to  with other for evaluation of sore throat. Symptoms started while in class yesterday, it was accompanied by chills, nasal congestion.     Mother reports yesterday evening, he developed left upper eyelid swelling, which appears to be improving. Still has the nasal congestion. Eyelid was swollen this morning. Mother reports giving Ibuprofen, benadryl, and used ice pack to the left eye which does appear to be improving with Benadryl.        Other significant problems:  There are no problems to display for this patient.      PAST MEDICAL, FAMILY AND SOCIAL HISTORY     Medications:  Current Outpatient Medications   Medication   • diphenhydrAMINE (BENADRYL) 25 MG capsule   • IBUPROFEN PO     No current facility-administered medications for this visit.       Allergies:  ALLERGIES:   Allergen Reactions   • Seasonal Runny Nose       Past Medical  History/Surgeries:  No past medical history on file.    No past surgical history on file.    Family History:  No family history on file.    Social History:  Social History     Tobacco Use   • Smoking status: Not on file   • Smokeless tobacco: Not on file   Substance Use Topics   • Alcohol use: Not on file       REVIEW OF SYSTEMS     Review of Systems   Constitutional: Positive for chills. Negative for activity change, appetite change, fatigue and fever.   HENT: Negative for congestion, drooling, ear discharge, ear pain, facial swelling, postnasal drip, rhinorrhea,  Patient placed on 4 L per EMS Patient resting on side of bed with call bell in reach, bed in lowest position sinus pressure, sinus pain, sneezing, sore throat and trouble swallowing.    Eyes: Positive for discharge and redness. Negative for pain and itching.   Respiratory: Negative for cough, choking and shortness of breath.    Gastrointestinal: Positive for nausea. Negative for abdominal pain, diarrhea and vomiting.   Musculoskeletal: Negative for myalgias and neck pain.       PHYSICAL EXAM     Physical Exam  Vitals and nursing note reviewed.   Constitutional:       General: He is active. He is not in acute distress.     Appearance: Normal appearance. He is well-developed and normal weight. He is not toxic-appearing.   HENT:      Head: Normocephalic and atraumatic.      Right Ear: Tympanic membrane, ear canal and external ear normal. There is no impacted cerumen. Tympanic membrane is not erythematous or bulging.      Left Ear: Tympanic membrane, ear canal and external ear normal. There is no impacted cerumen. Tympanic membrane is not erythematous or bulging.      Nose: Nose normal. No congestion or rhinorrhea.      Mouth/Throat:      Mouth: Mucous membranes are moist.      Pharynx: Oropharynx is clear. Posterior oropharyngeal erythema present. No oropharyngeal exudate.      Neck: No rigidity or tenderness.   Eyes:      General:         Right eye: No discharge.         Left eye: No discharge.      Extraocular Movements: Extraocular movements intact.      Conjunctiva/sclera: Conjunctivae normal.      Pupils: Pupils are equal, round, and reactive to light.   Cardiovascular:      Rate and Rhythm: Normal rate and regular rhythm.      Pulses: Normal pulses.      Heart sounds: Normal heart sounds.   Pulmonary:      Effort: Pulmonary effort is normal. No respiratory distress, nasal flaring or retractions.      Breath sounds: Normal breath sounds. No stridor or decreased air movement. No wheezing, rhonchi or rales.   Abdominal:      General: Abdomen is flat. Bowel sounds are normal.      Palpations: Abdomen is soft.    Lymphadenopathy:      Cervical: No cervical adenopathy.   Neurological:      Mental Status: He is alert.         ASSESSMENT/PLAN     1. Sore throat  - Centor criteria is 2. He is afebrile.   - Strep swab collected, continue symptomatic care, Tylenol, Ibuprofen, salt water gargling.  - Will call with results once available.   - No utensil or water bottle / glass sharing.   - GROUP A STREP THROAT PCR    2. Irritation of eyelid  - No signs of infection. Recommend use of warm compresses every 2-3 hours as needed.     The patient expresses understanding that this is the initial diagnosis and is in agreement with treatment and discharge plan, appropriately stable at time of discharge from urgent care clinic.  Often times this diagnosis can change.  The provisional diagnosis that the patient is discharged with today was based on the history taken, presenting symptoms, physical exam, and/or any ancillary testing.  If new symptoms occur or worsen, patient should seek immediate medical attention for re-evaluation.  Follow up as discussed in the after visit summary.  See the AVS (after visit summary) (patient discharge instructions) section for additional instructions, follow-up plans and/or emergency room precautions discussed with the patient.        The following PPE was worn by provider during all interactions and exam with this patient:  [] Gloves, goggles, level 1 procedural mask  [] Gloves, faceshield or goggles, level 3 procedural mask, and isolation gown  [x] Gloves, goggles, N95 mask, and isolation gown      Porsche Milan MD  01/29/22

## 2022-03-18 PROBLEM — R53.83 OTHER FATIGUE: Status: ACTIVE | Noted: 2018-10-22

## 2022-03-18 PROBLEM — J96.01 ACUTE RESPIRATORY FAILURE WITH HYPOXIA (HCC): Status: ACTIVE | Noted: 2021-02-18

## 2022-03-18 PROBLEM — R07.9 ACUTE CHEST PAIN: Status: ACTIVE | Noted: 2019-01-03

## 2022-03-18 PROBLEM — J96.22 ACUTE ON CHRONIC RESPIRATORY FAILURE WITH HYPERCAPNIA (HCC): Status: ACTIVE | Noted: 2019-04-05

## 2022-03-18 PROBLEM — J96.21 ACUTE ON CHRONIC RESPIRATORY FAILURE WITH HYPOXIA (HCC): Status: ACTIVE | Noted: 2019-03-15

## 2022-03-19 PROBLEM — J96.21 ACUTE ON CHRONIC RESPIRATORY FAILURE WITH HYPOXIA AND HYPERCAPNIA (HCC): Status: ACTIVE | Noted: 2018-01-30

## 2022-03-19 PROBLEM — B87.9 MAGGOT INFESTATION: Status: ACTIVE | Noted: 2017-07-25

## 2022-03-19 PROBLEM — K85.90 ACUTE PANCREATITIS: Status: ACTIVE | Noted: 2018-08-23

## 2022-03-19 PROBLEM — I47.29 NSVT (NONSUSTAINED VENTRICULAR TACHYCARDIA) (HCC): Status: ACTIVE | Noted: 2018-07-12

## 2022-03-19 PROBLEM — R74.8 ELEVATED LIPASE: Status: ACTIVE | Noted: 2018-08-21

## 2022-03-19 PROBLEM — J96.92 HYPERCAPNIC RESPIRATORY FAILURE (HCC): Status: ACTIVE | Noted: 2019-12-06

## 2022-03-19 PROBLEM — J18.9 MULTIFOCAL PNEUMONIA: Status: ACTIVE | Noted: 2017-09-27

## 2022-03-19 PROBLEM — C50.919 BREAST CANCER (HCC): Status: ACTIVE | Noted: 2018-08-23

## 2022-03-19 PROBLEM — R10.9 ABDOMINAL PAIN: Status: ACTIVE | Noted: 2018-08-21

## 2022-03-19 PROBLEM — R05.8 PRODUCTIVE COUGH: Status: ACTIVE | Noted: 2018-10-22

## 2022-03-19 PROBLEM — L30.4 INTERTRIGO: Status: ACTIVE | Noted: 2018-08-23

## 2022-03-19 PROBLEM — Z71.89 COUNSELING REGARDING GOALS OF CARE: Status: ACTIVE | Noted: 2018-08-23

## 2022-03-19 PROBLEM — J96.22 ACUTE ON CHRONIC RESPIRATORY FAILURE WITH HYPOXIA AND HYPERCAPNIA (HCC): Status: ACTIVE | Noted: 2018-01-30

## 2022-03-19 PROBLEM — R06.02 SHORTNESS OF BREATH: Status: ACTIVE | Noted: 2017-09-24

## 2022-03-20 PROBLEM — J96.00 ACUTE RESPIRATORY FAILURE (HCC): Status: ACTIVE | Noted: 2018-01-22

## 2022-03-20 PROBLEM — J96.90 RESPIRATORY FAILURE (HCC): Status: ACTIVE | Noted: 2018-08-19

## 2022-04-05 ENCOUNTER — HOSPITAL ENCOUNTER (INPATIENT)
Age: 52
LOS: 5 days | Discharge: HOME HEALTH | DRG: 189 | End: 2022-04-11
Attending: STUDENT IN AN ORGANIZED HEALTH CARE EDUCATION/TRAINING PROGRAM | Admitting: INTERNAL MEDICINE
Payer: MEDICARE

## 2022-04-05 ENCOUNTER — APPOINTMENT (OUTPATIENT)
Dept: GENERAL RADIOLOGY | Age: 52
DRG: 189 | End: 2022-04-05
Attending: STUDENT IN AN ORGANIZED HEALTH CARE EDUCATION/TRAINING PROGRAM
Payer: MEDICARE

## 2022-04-05 DIAGNOSIS — I50.33 ACUTE ON CHRONIC DIASTOLIC CONGESTIVE HEART FAILURE (HCC): ICD-10-CM

## 2022-04-05 DIAGNOSIS — J96.21 ACUTE ON CHRONIC RESPIRATORY FAILURE WITH HYPOXIA AND HYPERCAPNIA (HCC): Primary | ICD-10-CM

## 2022-04-05 DIAGNOSIS — C50.919 MALIGNANT NEOPLASM OF FEMALE BREAST, UNSPECIFIED ESTROGEN RECEPTOR STATUS, UNSPECIFIED LATERALITY, UNSPECIFIED SITE OF BREAST (HCC): ICD-10-CM

## 2022-04-05 DIAGNOSIS — J96.22 ACUTE ON CHRONIC RESPIRATORY FAILURE WITH HYPOXIA AND HYPERCAPNIA (HCC): Primary | ICD-10-CM

## 2022-04-05 DIAGNOSIS — J44.1 ACUTE EXACERBATION OF CHRONIC OBSTRUCTIVE PULMONARY DISEASE (COPD) (HCC): ICD-10-CM

## 2022-04-05 LAB
ALBUMIN SERPL-MCNC: 3.3 G/DL (ref 3.5–5)
ALBUMIN/GLOB SERPL: 0.6 {RATIO} (ref 1.1–2.2)
ALP SERPL-CCNC: 141 U/L (ref 45–117)
ALT SERPL-CCNC: 26 U/L (ref 12–78)
ANION GAP SERPL CALC-SCNC: 1 MMOL/L (ref 5–15)
AST SERPL-CCNC: 19 U/L (ref 15–37)
BASE EXCESS BLD CALC-SCNC: 5.7 MMOL/L
BASOPHILS # BLD: 0 K/UL (ref 0–0.1)
BASOPHILS NFR BLD: 0 % (ref 0–1)
BILIRUB SERPL-MCNC: 0.3 MG/DL (ref 0.2–1)
BNP SERPL-MCNC: 247 PG/ML
BUN SERPL-MCNC: 20 MG/DL (ref 6–20)
BUN/CREAT SERPL: 19 (ref 12–20)
CA-I BLD-MCNC: 1.48 MMOL/L (ref 1.12–1.32)
CALCIUM SERPL-MCNC: 10.8 MG/DL (ref 8.5–10.1)
CHLORIDE BLD-SCNC: 101 MMOL/L (ref 100–108)
CHLORIDE SERPL-SCNC: 101 MMOL/L (ref 97–108)
CO2 BLD-SCNC: 35 MMOL/L (ref 19–24)
CO2 SERPL-SCNC: 36 MMOL/L (ref 21–32)
CREAT SERPL-MCNC: 1.05 MG/DL (ref 0.55–1.02)
CREAT UR-MCNC: 1.1 MG/DL (ref 0.6–1.3)
DIFFERENTIAL METHOD BLD: ABNORMAL
EOSINOPHIL # BLD: 0.1 K/UL (ref 0–0.4)
EOSINOPHIL NFR BLD: 1 % (ref 0–7)
ERYTHROCYTE [DISTWIDTH] IN BLOOD BY AUTOMATED COUNT: 12.7 % (ref 11.5–14.5)
GLOBULIN SER CALC-MCNC: 5.1 G/DL (ref 2–4)
GLUCOSE BLD STRIP.AUTO-MCNC: 139 MG/DL (ref 74–106)
GLUCOSE SERPL-MCNC: 132 MG/DL (ref 65–100)
HCO3 BLDA-SCNC: 35 MMOL/L
HCT VFR BLD AUTO: 44.1 % (ref 35–47)
HGB BLD-MCNC: 12.9 G/DL (ref 11.5–16)
IMM GRANULOCYTES # BLD AUTO: 0 K/UL (ref 0–0.04)
IMM GRANULOCYTES NFR BLD AUTO: 0 % (ref 0–0.5)
LACTATE BLD-SCNC: 0.92 MMOL/L (ref 0.4–2)
LYMPHOCYTES # BLD: 1.1 K/UL (ref 0.8–3.5)
LYMPHOCYTES NFR BLD: 12 % (ref 12–49)
MCH RBC QN AUTO: 27.6 PG (ref 26–34)
MCHC RBC AUTO-ENTMCNC: 29.3 G/DL (ref 30–36.5)
MCV RBC AUTO: 94.4 FL (ref 80–99)
MONOCYTES # BLD: 0.7 K/UL (ref 0–1)
MONOCYTES NFR BLD: 8 % (ref 5–13)
NEUTS SEG # BLD: 7.2 K/UL (ref 1.8–8)
NEUTS SEG NFR BLD: 79 % (ref 32–75)
NRBC # BLD: 0 K/UL (ref 0–0.01)
NRBC BLD-RTO: 0 PER 100 WBC
PCO2 BLDV: 68.1 MMHG (ref 41–51)
PH BLDV: 7.31 [PH] (ref 7.32–7.42)
PLATELET # BLD AUTO: 164 K/UL (ref 150–400)
PMV BLD AUTO: 10.5 FL (ref 8.9–12.9)
PO2 BLDV: 52 MMHG (ref 25–40)
POTASSIUM BLD-SCNC: 4.3 MMOL/L (ref 3.5–5.5)
POTASSIUM SERPL-SCNC: 4.3 MMOL/L (ref 3.5–5.1)
PROT SERPL-MCNC: 8.4 G/DL (ref 6.4–8.2)
RBC # BLD AUTO: 4.67 M/UL (ref 3.8–5.2)
SODIUM BLD-SCNC: 143 MMOL/L (ref 136–145)
SODIUM SERPL-SCNC: 138 MMOL/L (ref 136–145)
SPECIMEN SITE: ABNORMAL
TROPONIN-HIGH SENSITIVITY: 18 NG/L (ref 0–51)
WBC # BLD AUTO: 9.2 K/UL (ref 3.6–11)

## 2022-04-05 PROCEDURE — 74011250637 HC RX REV CODE- 250/637: Performed by: STUDENT IN AN ORGANIZED HEALTH CARE EDUCATION/TRAINING PROGRAM

## 2022-04-05 PROCEDURE — 83880 ASSAY OF NATRIURETIC PEPTIDE: CPT

## 2022-04-05 PROCEDURE — 84484 ASSAY OF TROPONIN QUANT: CPT

## 2022-04-05 PROCEDURE — 93005 ELECTROCARDIOGRAM TRACING: CPT

## 2022-04-05 PROCEDURE — 71046 X-RAY EXAM CHEST 2 VIEWS: CPT

## 2022-04-05 PROCEDURE — 94761 N-INVAS EAR/PLS OXIMETRY MLT: CPT

## 2022-04-05 PROCEDURE — 74011250636 HC RX REV CODE- 250/636: Performed by: STUDENT IN AN ORGANIZED HEALTH CARE EDUCATION/TRAINING PROGRAM

## 2022-04-05 PROCEDURE — 96374 THER/PROPH/DIAG INJ IV PUSH: CPT

## 2022-04-05 PROCEDURE — 74011000250 HC RX REV CODE- 250: Performed by: STUDENT IN AN ORGANIZED HEALTH CARE EDUCATION/TRAINING PROGRAM

## 2022-04-05 PROCEDURE — 94640 AIRWAY INHALATION TREATMENT: CPT

## 2022-04-05 PROCEDURE — 99285 EMERGENCY DEPT VISIT HI MDM: CPT

## 2022-04-05 PROCEDURE — 82947 ASSAY GLUCOSE BLOOD QUANT: CPT

## 2022-04-05 PROCEDURE — 80053 COMPREHEN METABOLIC PANEL: CPT

## 2022-04-05 PROCEDURE — 36415 COLL VENOUS BLD VENIPUNCTURE: CPT

## 2022-04-05 PROCEDURE — 96375 TX/PRO/DX INJ NEW DRUG ADDON: CPT

## 2022-04-05 PROCEDURE — 85025 COMPLETE CBC W/AUTO DIFF WBC: CPT

## 2022-04-05 RX ORDER — ACETAMINOPHEN 500 MG
1000 TABLET ORAL
Status: COMPLETED | OUTPATIENT
Start: 2022-04-05 | End: 2022-04-05

## 2022-04-05 RX ORDER — ACETAMINOPHEN 325 MG/1
650 TABLET ORAL
Status: DISCONTINUED | OUTPATIENT
Start: 2022-04-05 | End: 2022-04-06 | Stop reason: SDUPTHER

## 2022-04-05 RX ORDER — IPRATROPIUM BROMIDE AND ALBUTEROL SULFATE 2.5; .5 MG/3ML; MG/3ML
3 SOLUTION RESPIRATORY (INHALATION)
Status: COMPLETED | OUTPATIENT
Start: 2022-04-05 | End: 2022-04-05

## 2022-04-05 RX ORDER — ONDANSETRON 2 MG/ML
4 INJECTION INTRAMUSCULAR; INTRAVENOUS
Status: DISCONTINUED | OUTPATIENT
Start: 2022-04-05 | End: 2022-04-06 | Stop reason: SDUPTHER

## 2022-04-05 RX ORDER — FUROSEMIDE 10 MG/ML
60 INJECTION INTRAMUSCULAR; INTRAVENOUS
Status: COMPLETED | OUTPATIENT
Start: 2022-04-05 | End: 2022-04-05

## 2022-04-05 RX ADMIN — IPRATROPIUM BROMIDE AND ALBUTEROL SULFATE 3 ML: .5; 3 SOLUTION RESPIRATORY (INHALATION) at 20:16

## 2022-04-05 RX ADMIN — METHYLPREDNISOLONE SODIUM SUCCINATE 125 MG: 125 INJECTION, POWDER, FOR SOLUTION INTRAMUSCULAR; INTRAVENOUS at 20:02

## 2022-04-05 RX ADMIN — ACETAMINOPHEN 1000 MG: 500 TABLET ORAL at 22:04

## 2022-04-05 RX ADMIN — IPRATROPIUM BROMIDE AND ALBUTEROL SULFATE 3 ML: .5; 3 SOLUTION RESPIRATORY (INHALATION) at 20:15

## 2022-04-05 RX ADMIN — FUROSEMIDE 60 MG: 10 INJECTION, SOLUTION INTRAMUSCULAR; INTRAVENOUS at 22:04

## 2022-04-05 RX ADMIN — IPRATROPIUM BROMIDE AND ALBUTEROL SULFATE 3 ML: .5; 3 SOLUTION RESPIRATORY (INHALATION) at 20:13

## 2022-04-05 NOTE — ED PROVIDER NOTES
EMERGENCY DEPARTMENT HISTORY AND PHYSICAL EXAM      Date: 4/5/2022  Patient Name: Belkys Tineo    History of Presenting Illness     Chief Complaint   Patient presents with    Shortness of Breath     Reports worsening SOB starting this am, wears 5L NC at baseline. Hx of COPD and asthma. Denied chest pain or new cough. History Provided By: Patient    HPI: Belkys Tineo, 54 y.o. female with past medical history of hypertension, COPD, asthma, diastolic CHF, CAD status post PTCA, type 2 diabetes, chronic respiratory failure on 5 L of home O2 around-the-clock, presents to the ED with cc of shortness of breath x1 day. Patient reports symptoms began earlier this morning. She reports noticing her SPO2 dropping at home, and feeling like she cannot get in enough air. She denies any associated cough, chest pain, fevers or chills. No URI symptoms. No known sick contacts. Patient reports she does use nebulizer treatments scheduled at home, and has been compliant with this. Patient also reports compliance with Lasix, which she takes twice a day. She reports orthopnea at baseline, however, reports this has noticeably worsened recently. States that she feels like she is being \"strangled\" when she attempts to lay recumbent. She reports some increased bilateral lower extremity edema. She is unsure if she has gained weight, but she thinks that her abdomen looks more \"bloated\" than usual.  Denies significant changes to her sodium intake. Reports increased dyspnea on exertion as well as decreased activity tolerance secondary to respiratory symptoms. PCP: Fannie Lindsay MD    No current facility-administered medications on file prior to encounter. Current Outpatient Medications on File Prior to Encounter   Medication Sig Dispense Refill    albuterol (Ventolin HFA) 90 mcg/actuation inhaler Take 2 Puffs by inhalation every six (6) hours as needed for Wheezing.  1 Inhaler 1    spironolactone (ALDACTONE) 25 mg tablet Take 1 Tab by mouth daily. 30 Tab 0    cyclobenzaprine (FLEXERIL) 5 mg tablet Take 5 mg by mouth two (2) times a day.  fluticasone-umeclidinium-vilanterol (Trelegy Ellipta) 100-62.5-25 mcg inhaler Take 1 Puff by inhalation daily.  NovoLOG Flexpen U-100 Insulin 100 unit/mL (3 mL) inpn 5-10 Units by SubCUTAneous route as directed. Sliding scale: -300: 5 units -400 units: 10 units      ammonium lactate (LAC-HYDRIN) 12 % lotion Apply 1 Applicator to affected area as needed. rub in to affected area well      omeprazole (PRILOSEC) 40 mg capsule Take 40 mg by mouth daily.  nystatin (MYCOSTATIN) topical cream Apply  to affected area two (2) times a day. 15 g 0    albuterol-ipratropium (DUO-NEB) 2.5 mg-0.5 mg/3 ml nebu 3 mL by Nebulization route four (4) times daily. 100 Nebule 1    furosemide (LASIX) 40 mg tablet Take 40 mg by mouth two (2) times a day.  fluticasone (FLONASE) 50 mcg/actuation nasal spray 2 Sprays by Both Nostrils route daily. 1 Bottle 0    insulin glargine (LANTUS) 100 unit/mL injection 35 Units by SubCUTAneous route daily.  metoprolol tartrate (LOPRESSOR) 25 mg tablet Take 25 mg by mouth two (2) times a day.  loratadine (CLARITIN) 10 mg tablet Take 10 mg by mouth daily.  aspirin 81 mg chewable tablet Take 81 mg by mouth daily.  lovastatin (MEVACOR) 40 mg tablet Take 1 Tab by mouth nightly. 30 Tab 6    glyBURIDE (DIABETA) 5 mg tablet Take 5 mg by mouth two (2) times daily (with meals).  nitroglycerin (NITROSTAT) 0.4 mg SL tablet 1 Tab by SubLINGual route every five (5) minutes as needed for Chest Pain (call 911 if not relieved by 3).  25 Tab 2       Past History     Past Medical History:  Past Medical History:   Diagnosis Date    Arthritis     Asthma     Breast lump     CAD (coronary artery disease)     Cancer (HCC)     breast cancer    Congestive heart failure (HCC)     COPD [facility]chronic obstructive pulmonary disease) ([facility]     Diabetes ([facility]     Hypertension     Morbid obesity with BMI of 70 and over, adult ([facility]     NSTEMI (non-ST elevated myocardial infarction) ([facility]     NSTEMI (non-ST elevated myocardial infarction) ([facility] 2012    SVT (supraventricular tachycardia) (HCC)        Past Surgical History:  Past Surgical History:   Procedure Laterality Date    HX  SECTION      HX ORTHOPAEDIC      HX OTHER SURGICAL      cyst removed from back    AK CARDIAC SURG PROCEDURE UNLIST      Stents       Family History:  Family History   Problem Relation Age of Onset    Heart Disease Mother     Heart Disease Father     Heart Disease Sister     Breast Cancer Maternal Grandmother     Breast Cancer Paternal Grandmother        Social History:  Social History     Tobacco Use    Smoking status: Former Smoker     Packs/day: 0.25     Types: Cigarettes    Smokeless tobacco: Never Used    Tobacco comment: Patient states \"I  aint smoking no more d*mn cigarettes after yesterday\" 2018   Substance Use Topics    Alcohol use: No    Drug use: No       Allergies:  No Known Allergies      Review of Systems   Review of Systems   Constitutional: Negative for chills and fever. HENT: Negative for congestion and rhinorrhea. Eyes: Negative for visual disturbance. Respiratory: Positive for shortness of breath. Negative for cough, chest tightness and wheezing. Cardiovascular: Positive for leg swelling. Negative for chest pain and palpitations. Gastrointestinal: Negative for abdominal pain, diarrhea, nausea and vomiting. Genitourinary: Negative for dysuria, flank pain and hematuria. Musculoskeletal: Negative for back pain and neck pain. Skin: Negative for rash. Allergic/Immunologic: Negative for immunocompromised state. Neurological: Negative for dizziness, speech difficulty, weakness and headaches. Hematological: Negative for adenopathy.    Psychiatric/Behavioral: Negative for dysphoric mood and suicidal ideas. Physical Exam   Physical Exam  Vitals and nursing note reviewed. Constitutional:       General: She is not in acute distress. Appearance: She is obese. She is not ill-appearing or toxic-appearing. HENT:      Head: Normocephalic and atraumatic. Nose: Nose normal.      Mouth/Throat:      Mouth: Mucous membranes are moist.   Eyes:      Extraocular Movements: Extraocular movements intact. Pupils: Pupils are equal, round, and reactive to light. Cardiovascular:      Rate and Rhythm: Normal rate and regular rhythm. Pulses: Normal pulses. Pulmonary:      Effort: Pulmonary effort is normal. Tachypnea present. Breath sounds: Decreased air movement present. No stridor. Decreased breath sounds and wheezing present. No rhonchi. Abdominal:      General: Abdomen is flat. There is no distension. Tenderness: There is no abdominal tenderness. Musculoskeletal:         General: Normal range of motion. Cervical back: Normal range of motion and neck supple. Right lower leg: No tenderness. Edema present. Left lower leg: No tenderness. Edema present. Skin:     General: Skin is warm and dry. Neurological:      General: No focal deficit present. Mental Status: She is alert and oriented to person, place, and time.    Psychiatric:         Judgment: Judgment normal.         Diagnostic Study Results     Labs -     Recent Results (from the past 24 hour(s))   CBC WITH AUTOMATED DIFF    Collection Time: 04/05/22  7:20 PM   Result Value Ref Range    WBC 9.2 3.6 - 11.0 K/uL    RBC 4.67 3.80 - 5.20 M/uL    HGB 12.9 11.5 - 16.0 g/dL    HCT 44.1 35.0 - 47.0 %    MCV 94.4 80.0 - 99.0 FL    MCH 27.6 26.0 - 34.0 PG    MCHC 29.3 (L) 30.0 - 36.5 g/dL    RDW 12.7 11.5 - 14.5 %    PLATELET 059 906 - 154 K/uL    MPV 10.5 8.9 - 12.9 FL    NRBC 0.0 0  WBC    ABSOLUTE NRBC 0.00 0.00 - 0.01 K/uL    NEUTROPHILS 79 (H) 32 - 75 %    LYMPHOCYTES 12 12 - 49 %    MONOCYTES 8 5 - 13 %    EOSINOPHILS 1 0 - 7 %    BASOPHILS 0 0 - 1 %    IMMATURE GRANULOCYTES 0 0.0 - 0.5 %    ABS. NEUTROPHILS 7.2 1.8 - 8.0 K/UL    ABS. LYMPHOCYTES 1.1 0.8 - 3.5 K/UL    ABS. MONOCYTES 0.7 0.0 - 1.0 K/UL    ABS. EOSINOPHILS 0.1 0.0 - 0.4 K/UL    ABS. BASOPHILS 0.0 0.0 - 0.1 K/UL    ABS. IMM. GRANS. 0.0 0.00 - 0.04 K/UL    DF AUTOMATED     METABOLIC PANEL, COMPREHENSIVE    Collection Time: 04/05/22  7:20 PM   Result Value Ref Range    Sodium 138 136 - 145 mmol/L    Potassium 4.3 3.5 - 5.1 mmol/L    Chloride 101 97 - 108 mmol/L    CO2 36 (H) 21 - 32 mmol/L    Anion gap 1 (L) 5 - 15 mmol/L    Glucose 132 (H) 65 - 100 mg/dL    BUN 20 6 - 20 MG/DL    Creatinine 1.05 (H) 0.55 - 1.02 MG/DL    BUN/Creatinine ratio 19 12 - 20      GFR est AA >60 >60 ml/min/1.73m2    GFR est non-AA 56 (L) >60 ml/min/1.73m2    Calcium 10.8 (H) 8.5 - 10.1 MG/DL    Bilirubin, total 0.3 0.2 - 1.0 MG/DL    ALT (SGPT) 26 12 - 78 U/L    AST (SGOT) 19 15 - 37 U/L    Alk.  phosphatase 141 (H) 45 - 117 U/L    Protein, total 8.4 (H) 6.4 - 8.2 g/dL    Albumin 3.3 (L) 3.5 - 5.0 g/dL    Globulin 5.1 (H) 2.0 - 4.0 g/dL    A-G Ratio 0.6 (L) 1.1 - 2.2     TROPONIN-HIGH SENSITIVITY    Collection Time: 04/05/22  7:20 PM   Result Value Ref Range    Troponin-High Sensitivity 18 0 - 51 ng/L   NT-PRO BNP    Collection Time: 04/05/22  7:20 PM   Result Value Ref Range    NT pro- (H) <125 PG/ML   EKG, 12 LEAD, INITIAL    Collection Time: 04/05/22  7:27 PM   Result Value Ref Range    Ventricular Rate 93 BPM    Atrial Rate 93 BPM    P-R Interval 186 ms    QRS Duration 74 ms    Q-T Interval 354 ms    QTC Calculation (Bezet) 440 ms    Calculated P Axis 78 degrees    Calculated R Axis 74 degrees    Calculated T Axis 42 degrees    Diagnosis       Normal sinus rhythm  Low voltage QRS  When compared with ECG of 18-FEB-2021 21:50,  No significant change was found     BLOOD GAS,CHEM8,LACTIC ACID POC    Collection Time: 04/05/22  8:10 PM   Result Value Ref Range Calcium, ionized (POC) 1.48 (H) 1.12 - 1.32 mmol/L    BICARBONATE 35 mmol/L    Base excess (POC) 5.7 mmol/L    Sample source VENOUS BLOOD      CO2, POC 35 (H) 19 - 24 MMOL/L    Sodium,  136 - 145 MMOL/L    Potassium, POC 4.3 3.5 - 5.5 MMOL/L    Chloride,  100 - 108 MMOL/L    Glucose,  (H) 74 - 106 MG/DL    Creatinine, POC 1.1 0.6 - 1.3 MG/DL    Lactic Acid (POC) 0.92 0.40 - 2.00 mmol/L    pH, venous (POC) 7.31 (L) 7.32 - 7.42      pCO2, venous (POC) 68.1 (H) 41 - 51 MMHG    pO2, venous (POC) 52 (H) 25 - 40 mmHg       Radiologic Studies -   XR CHEST PA LAT   Final Result   Poorly venous congestion without overt pulmonary edema or other   acute findings. CT Results  (Last 48 hours)    None        CXR Results  (Last 48 hours)               04/05/22 2106  XR CHEST PA LAT Final result    Impression:  Poorly venous congestion without overt pulmonary edema or other   acute findings. Narrative:  EXAM: XR CHEST PA LAT       INDICATION: Shortness of Breath       COMPARISON: 2/18/2021. FINDINGS: PA and lateral radiographs of the chest demonstrate mild pulmonary   venous congestion. There is no consolidation, pneumothorax or pleural effusion. Mild-moderate cardiac contour enlargement is again shown. No mediastinal or   hilar enlargement is evident. Mild thoracic spine degenerative changes are   shown. Medical Decision Making   I, Tata Perez MD am the first provider for this patient, and I am the attending of record for this patient encounter. I reviewed the vital signs, available nursing notes, past medical history, past surgical history, family history and social history. Vital Signs-Reviewed the patient's vital signs.   Patient Vitals for the past 24 hrs:   Temp Pulse Resp BP SpO2   04/05/22 2224  96 20  92 %   04/05/22 2223  97 27  92 %   04/05/22 2222  98 16  93 %   04/05/22 2221  96 21  92 %   04/05/22 2204  (!) 120      04/05/22 2122  94 16  90 %   04/05/22 2016     96 %   04/05/22 1949     94 %   04/05/22 1912 97.3 °F (36.3 °C) 99 24 134/79 100 %       EKG interpretation: (Preliminary)  Normal sinus rhythm, 93 bpm, no significant acute ST changes when compared to previous EKG. QTc normal. EKG interpretation by Janel Maxwell MD    Records Reviewed: Nursing Notes and Old Medical Records    Provider Notes (Medical Decision Making):   DDx: COPD/asthma exacerbation, CHF exacerbation, pneumonia, etc.    Plan: EKG, CBC, CMP, troponin, proBNP, VBG, chest x-ray. We will treat patient's symptoms with serial DuoNeb's x 3, and 125 mg of Solu-Medrol. Work-up in the ED is remarkable for mild respiratory acidosis with pH of 7.31, PCO2 elevated at 68. Consistent with obstructive pathology. No lactic acidosis. No leukocytosis. proBNP mildly elevated at 247, with patient's morbidly obese body habitus likely playing a role in reflecting lower than expected proBNP levels. X-ray show pulmonary venous congestion without overt pulmonary edema. However, in setting of patient endorsing increased lower extremity edema, worsening orthopnea, and possible weight gainI suspect she is likely fluid overloaded. Will diurese with 60 mg of IV Lasix. Attempted ambulation in the ED, however, per RN patient became extremely dyspneic with significantly increased work of breathing upon going from sitting to standing, with SPO2 dropping to 88% even without taking any steps on home 5L of O2. HR also increased to 120 with standing. Considering this, patient would benefit from admission for further management and optimization of her respiratory status. Case discussed with the hospitalist, who has accepted her for admission in stable condition.      CRITICAL CARE NOTE:    Authorized and Performed by: Janel Maxwell MD    IMPENDING DETERIORATION -Respiratory, Cardiovascular and Metabolic  ASSOCIATED RISK FACTORS - Hypoxia, Dysrhythmia, Metabolic changes, Dehydration and Vascular Compromise  MANAGEMENT- Bedside Assessment  INTERPRETATION -  Xrays, Blood Gases, ECG, Blood Pressure, Cardiac Output Measures, continuous pulse ox, and all labs  INTERVENTIONS - serial duonebs x3, IV steroids, IV lasix  CASE REVIEW - Hospitalist/Intensivist and Nursing  TREATMENT RESPONSE -Stable  PERFORMED BY - Self    I have spent 80 minutes of critical care time involved in obtaining a history, examining the patient, lab review, arranging urgent treatment with development of a management plan, consultations with other providers, family decision- making, bedside attention and documentation. During this entire length of time I was immediately available to the patient . Critical Care: The reason for providing this level of medical care for this critically ill patient was due to a critical illness that impaired one or more vital organ systems, such that there was a high probability of imminent or life threatening deterioration in the patient's condition. This care involved high complexity decision making to assess, manipulate, and support vital system functions, to treat this degree of vital organ system failure, and to prevent further life threatening deterioration of the patients condition. Critical care time was exclusive of separately billable procedures. ED Course:   Initial assessment performed. The patient's presenting problems have been discussed, and they are in agreement with the care plan formulated and outlined with them. I have encouraged them to ask questions as they arise throughout their visit. Huan Santiago MD      Disposition:  Admit      Diagnosis     Clinical Impression:   1. Acute on chronic respiratory failure with hypoxia and hypercapnia (HCC)    2. Acute exacerbation of chronic obstructive pulmonary disease (COPD) ([facility]    3.  Acute on chronic diastolic congestive heart failure (HCC)        Attestations:    Huan Santiago MD    Please note that this dictation was completed with Dragon, the computer voice recognition software. Quite often unanticipated grammatical, syntax, homophones, and other interpretive errors are inadvertently transcribed by the computer software. Please disregard these errors. Please excuse any errors that have escaped final proofreading. Thank you.

## 2022-04-06 LAB
ATRIAL RATE: 93 BPM
CALCULATED P AXIS, ECG09: 78 DEGREES
CALCULATED R AXIS, ECG10: 74 DEGREES
CALCULATED T AXIS, ECG11: 42 DEGREES
DIAGNOSIS, 93000: NORMAL
GLUCOSE BLD STRIP.AUTO-MCNC: 205 MG/DL (ref 65–117)
GLUCOSE BLD STRIP.AUTO-MCNC: 210 MG/DL (ref 65–117)
GLUCOSE BLD STRIP.AUTO-MCNC: 217 MG/DL (ref 65–117)
GLUCOSE BLD STRIP.AUTO-MCNC: 237 MG/DL (ref 65–117)
P-R INTERVAL, ECG05: 186 MS
PROCALCITONIN SERPL-MCNC: 0.1 NG/ML
Q-T INTERVAL, ECG07: 354 MS
QRS DURATION, ECG06: 74 MS
QTC CALCULATION (BEZET), ECG08: 440 MS
SERVICE CMNT-IMP: ABNORMAL
VENTRICULAR RATE, ECG03: 93 BPM

## 2022-04-06 PROCEDURE — 74011000250 HC RX REV CODE- 250: Performed by: INTERNAL MEDICINE

## 2022-04-06 PROCEDURE — 74011250636 HC RX REV CODE- 250/636: Performed by: INTERNAL MEDICINE

## 2022-04-06 PROCEDURE — 65270000029 HC RM PRIVATE

## 2022-04-06 PROCEDURE — 74011000250 HC RX REV CODE- 250: Performed by: STUDENT IN AN ORGANIZED HEALTH CARE EDUCATION/TRAINING PROGRAM

## 2022-04-06 PROCEDURE — 74011250637 HC RX REV CODE- 250/637

## 2022-04-06 PROCEDURE — 74011250636 HC RX REV CODE- 250/636

## 2022-04-06 PROCEDURE — 74011636637 HC RX REV CODE- 636/637: Performed by: INTERNAL MEDICINE

## 2022-04-06 PROCEDURE — 77010033678 HC OXYGEN DAILY

## 2022-04-06 PROCEDURE — 82962 GLUCOSE BLOOD TEST: CPT

## 2022-04-06 PROCEDURE — 74011250636 HC RX REV CODE- 250/636: Performed by: STUDENT IN AN ORGANIZED HEALTH CARE EDUCATION/TRAINING PROGRAM

## 2022-04-06 PROCEDURE — 74011250637 HC RX REV CODE- 250/637: Performed by: STUDENT IN AN ORGANIZED HEALTH CARE EDUCATION/TRAINING PROGRAM

## 2022-04-06 PROCEDURE — 84145 PROCALCITONIN (PCT): CPT

## 2022-04-06 PROCEDURE — 74011250637 HC RX REV CODE- 250/637: Performed by: INTERNAL MEDICINE

## 2022-04-06 PROCEDURE — 36415 COLL VENOUS BLD VENIPUNCTURE: CPT

## 2022-04-06 PROCEDURE — 94640 AIRWAY INHALATION TREATMENT: CPT

## 2022-04-06 RX ORDER — METOPROLOL TARTRATE 25 MG/1
25 TABLET, FILM COATED ORAL 2 TIMES DAILY
Status: DISCONTINUED | OUTPATIENT
Start: 2022-04-06 | End: 2022-04-10

## 2022-04-06 RX ORDER — GUAIFENESIN 100 MG/5ML
81 LIQUID (ML) ORAL DAILY
Status: DISCONTINUED | OUTPATIENT
Start: 2022-04-06 | End: 2022-04-11 | Stop reason: HOSPADM

## 2022-04-06 RX ORDER — IPRATROPIUM BROMIDE AND ALBUTEROL SULFATE 2.5; .5 MG/3ML; MG/3ML
3 SOLUTION RESPIRATORY (INHALATION)
Status: COMPLETED | OUTPATIENT
Start: 2022-04-06 | End: 2022-04-06

## 2022-04-06 RX ORDER — MAGNESIUM SULFATE 100 %
4 CRYSTALS MISCELLANEOUS AS NEEDED
Status: DISCONTINUED | OUTPATIENT
Start: 2022-04-06 | End: 2022-04-11 | Stop reason: HOSPADM

## 2022-04-06 RX ORDER — PANTOPRAZOLE SODIUM 40 MG/1
40 TABLET, DELAYED RELEASE ORAL
Status: DISCONTINUED | OUTPATIENT
Start: 2022-04-06 | End: 2022-04-11 | Stop reason: HOSPADM

## 2022-04-06 RX ORDER — CYCLOBENZAPRINE HCL 10 MG
5 TABLET ORAL
Status: DISCONTINUED | OUTPATIENT
Start: 2022-04-06 | End: 2022-04-11 | Stop reason: HOSPADM

## 2022-04-06 RX ORDER — ATORVASTATIN CALCIUM 10 MG/1
10 TABLET, FILM COATED ORAL DAILY
Status: DISCONTINUED | OUTPATIENT
Start: 2022-04-06 | End: 2022-04-11 | Stop reason: HOSPADM

## 2022-04-06 RX ORDER — INSULIN LISPRO 100 [IU]/ML
INJECTION, SOLUTION INTRAVENOUS; SUBCUTANEOUS
Status: DISCONTINUED | OUTPATIENT
Start: 2022-04-06 | End: 2022-04-11 | Stop reason: HOSPADM

## 2022-04-06 RX ORDER — SODIUM CHLORIDE 0.9 % (FLUSH) 0.9 %
5-40 SYRINGE (ML) INJECTION AS NEEDED
Status: DISCONTINUED | OUTPATIENT
Start: 2022-04-06 | End: 2022-04-11 | Stop reason: HOSPADM

## 2022-04-06 RX ORDER — CYCLOBENZAPRINE HCL 10 MG
5 TABLET ORAL 2 TIMES DAILY
Status: DISCONTINUED | OUTPATIENT
Start: 2022-04-06 | End: 2022-04-06

## 2022-04-06 RX ORDER — IPRATROPIUM BROMIDE AND ALBUTEROL SULFATE 2.5; .5 MG/3ML; MG/3ML
3 SOLUTION RESPIRATORY (INHALATION) EVERY 4 HOURS
Status: DISCONTINUED | OUTPATIENT
Start: 2022-04-06 | End: 2022-04-08

## 2022-04-06 RX ORDER — FLUTICASONE PROPIONATE 50 MCG
2 SPRAY, SUSPENSION (ML) NASAL DAILY
Status: DISCONTINUED | OUTPATIENT
Start: 2022-04-06 | End: 2022-04-11 | Stop reason: HOSPADM

## 2022-04-06 RX ORDER — DEXTROSE MONOHYDRATE 100 MG/ML
0-250 INJECTION, SOLUTION INTRAVENOUS AS NEEDED
Status: DISCONTINUED | OUTPATIENT
Start: 2022-04-06 | End: 2022-04-11 | Stop reason: HOSPADM

## 2022-04-06 RX ORDER — ONDANSETRON 2 MG/ML
4 INJECTION INTRAMUSCULAR; INTRAVENOUS
Status: DISCONTINUED | OUTPATIENT
Start: 2022-04-06 | End: 2022-04-11 | Stop reason: HOSPADM

## 2022-04-06 RX ORDER — OXYCODONE AND ACETAMINOPHEN 5; 325 MG/1; MG/1
1 TABLET ORAL
Status: DISCONTINUED | OUTPATIENT
Start: 2022-04-06 | End: 2022-04-11 | Stop reason: HOSPADM

## 2022-04-06 RX ORDER — BUDESONIDE 0.25 MG/2ML
250 INHALANT ORAL
Status: DISCONTINUED | OUTPATIENT
Start: 2022-04-06 | End: 2022-04-11 | Stop reason: HOSPADM

## 2022-04-06 RX ORDER — POLYETHYLENE GLYCOL 3350 17 G/17G
17 POWDER, FOR SOLUTION ORAL DAILY PRN
Status: DISCONTINUED | OUTPATIENT
Start: 2022-04-06 | End: 2022-04-11 | Stop reason: HOSPADM

## 2022-04-06 RX ORDER — NITROGLYCERIN 0.4 MG/1
0.4 TABLET SUBLINGUAL
Status: DISCONTINUED | OUTPATIENT
Start: 2022-04-06 | End: 2022-04-11 | Stop reason: HOSPADM

## 2022-04-06 RX ORDER — ACETAMINOPHEN 650 MG/1
650 SUPPOSITORY RECTAL
Status: DISCONTINUED | OUTPATIENT
Start: 2022-04-06 | End: 2022-04-11 | Stop reason: HOSPADM

## 2022-04-06 RX ORDER — CHLORPHENIRAMINE MALEATE 4 MG
TABLET ORAL 2 TIMES DAILY
Status: DISCONTINUED | OUTPATIENT
Start: 2022-04-06 | End: 2022-04-08

## 2022-04-06 RX ORDER — ACETAMINOPHEN 325 MG/1
650 TABLET ORAL
Status: DISCONTINUED | OUTPATIENT
Start: 2022-04-06 | End: 2022-04-11 | Stop reason: HOSPADM

## 2022-04-06 RX ORDER — BUDESONIDE 0.25 MG/2ML
250 INHALANT ORAL 2 TIMES DAILY
Status: DISCONTINUED | OUTPATIENT
Start: 2022-04-06 | End: 2022-04-06

## 2022-04-06 RX ORDER — ARFORMOTEROL TARTRATE 15 UG/2ML
15 SOLUTION RESPIRATORY (INHALATION)
Status: DISCONTINUED | OUTPATIENT
Start: 2022-04-06 | End: 2022-04-11 | Stop reason: HOSPADM

## 2022-04-06 RX ORDER — SODIUM CHLORIDE 0.9 % (FLUSH) 0.9 %
5-40 SYRINGE (ML) INJECTION EVERY 8 HOURS
Status: DISCONTINUED | OUTPATIENT
Start: 2022-04-06 | End: 2022-04-11 | Stop reason: HOSPADM

## 2022-04-06 RX ORDER — IPRATROPIUM BROMIDE 0.5 MG/2.5ML
0.5 SOLUTION RESPIRATORY (INHALATION)
Status: DISCONTINUED | OUTPATIENT
Start: 2022-04-06 | End: 2022-04-06

## 2022-04-06 RX ORDER — INSULIN GLARGINE 100 [IU]/ML
35 INJECTION, SOLUTION SUBCUTANEOUS DAILY
Status: DISCONTINUED | OUTPATIENT
Start: 2022-04-06 | End: 2022-04-07

## 2022-04-06 RX ORDER — ENOXAPARIN SODIUM 100 MG/ML
40 INJECTION SUBCUTANEOUS DAILY
Status: DISCONTINUED | OUTPATIENT
Start: 2022-04-06 | End: 2022-04-06

## 2022-04-06 RX ORDER — ARFORMOTEROL TARTRATE 15 UG/2ML
15 SOLUTION RESPIRATORY (INHALATION)
Status: DISCONTINUED | OUTPATIENT
Start: 2022-04-06 | End: 2022-04-06

## 2022-04-06 RX ORDER — ENOXAPARIN SODIUM 100 MG/ML
40 INJECTION SUBCUTANEOUS EVERY 12 HOURS
Status: DISCONTINUED | OUTPATIENT
Start: 2022-04-06 | End: 2022-04-11 | Stop reason: HOSPADM

## 2022-04-06 RX ORDER — PROMETHAZINE HYDROCHLORIDE 25 MG/1
12.5 TABLET ORAL
Status: DISCONTINUED | OUTPATIENT
Start: 2022-04-06 | End: 2022-04-11 | Stop reason: HOSPADM

## 2022-04-06 RX ADMIN — ATORVASTATIN CALCIUM 10 MG: 10 TABLET, FILM COATED ORAL at 08:13

## 2022-04-06 RX ADMIN — IPRATROPIUM BROMIDE AND ALBUTEROL SULFATE 3 ML: .5; 3 SOLUTION RESPIRATORY (INHALATION) at 16:14

## 2022-04-06 RX ADMIN — SODIUM CHLORIDE, PRESERVATIVE FREE 10 ML: 5 INJECTION INTRAVENOUS at 00:22

## 2022-04-06 RX ADMIN — Medication 3 UNITS: at 08:13

## 2022-04-06 RX ADMIN — IPRATROPIUM BROMIDE AND ALBUTEROL SULFATE 3 ML: .5; 3 SOLUTION RESPIRATORY (INHALATION) at 19:26

## 2022-04-06 RX ADMIN — Medication 3 UNITS: at 13:14

## 2022-04-06 RX ADMIN — CLOTRIMAZOLE: 10 CREAM TOPICAL at 18:57

## 2022-04-06 RX ADMIN — ARFORMOTEROL TARTRATE 15 MCG: 15 SOLUTION RESPIRATORY (INHALATION) at 01:10

## 2022-04-06 RX ADMIN — IPRATROPIUM BROMIDE AND ALBUTEROL SULFATE 3 ML: .5; 3 SOLUTION RESPIRATORY (INHALATION) at 23:38

## 2022-04-06 RX ADMIN — Medication 3 UNITS: at 17:41

## 2022-04-06 RX ADMIN — IPRATROPIUM BROMIDE AND ALBUTEROL SULFATE 3 ML: .5; 3 SOLUTION RESPIRATORY (INHALATION) at 12:37

## 2022-04-06 RX ADMIN — ARFORMOTEROL TARTRATE 15 MCG: 15 SOLUTION RESPIRATORY (INHALATION) at 09:00

## 2022-04-06 RX ADMIN — IPRATROPIUM BROMIDE 0.5 MG: 0.5 SOLUTION RESPIRATORY (INHALATION) at 10:28

## 2022-04-06 RX ADMIN — IPRATROPIUM BROMIDE AND ALBUTEROL SULFATE 3 ML: .5; 3 SOLUTION RESPIRATORY (INHALATION) at 01:00

## 2022-04-06 RX ADMIN — IPRATROPIUM BROMIDE AND ALBUTEROL SULFATE 3 ML: .5; 3 SOLUTION RESPIRATORY (INHALATION) at 01:03

## 2022-04-06 RX ADMIN — ENOXAPARIN SODIUM 40 MG: 40 INJECTION SUBCUTANEOUS at 21:25

## 2022-04-06 RX ADMIN — Medication 2 UNITS: at 22:27

## 2022-04-06 RX ADMIN — METHYLPREDNISOLONE SODIUM SUCCINATE 40 MG: 40 INJECTION, POWDER, FOR SOLUTION INTRAMUSCULAR; INTRAVENOUS at 00:22

## 2022-04-06 RX ADMIN — BUDESONIDE 250 MCG: 0.25 INHALANT RESPIRATORY (INHALATION) at 09:00

## 2022-04-06 RX ADMIN — CYCLOBENZAPRINE 5 MG: 10 TABLET, FILM COATED ORAL at 21:25

## 2022-04-06 RX ADMIN — METHYLPREDNISOLONE SODIUM SUCCINATE 40 MG: 40 INJECTION, POWDER, FOR SOLUTION INTRAMUSCULAR; INTRAVENOUS at 06:06

## 2022-04-06 RX ADMIN — METHYLPREDNISOLONE SODIUM SUCCINATE 40 MG: 40 INJECTION, POWDER, FOR SOLUTION INTRAMUSCULAR; INTRAVENOUS at 21:25

## 2022-04-06 RX ADMIN — ENOXAPARIN SODIUM 40 MG: 40 INJECTION SUBCUTANEOUS at 08:14

## 2022-04-06 RX ADMIN — CYCLOBENZAPRINE 5 MG: 10 TABLET, FILM COATED ORAL at 06:06

## 2022-04-06 RX ADMIN — SODIUM CHLORIDE, PRESERVATIVE FREE 10 ML: 5 INJECTION INTRAVENOUS at 21:25

## 2022-04-06 RX ADMIN — INSULIN GLARGINE 35 UNITS: 100 INJECTION, SOLUTION SUBCUTANEOUS at 08:13

## 2022-04-06 RX ADMIN — METHYLPREDNISOLONE SODIUM SUCCINATE 40 MG: 40 INJECTION, POWDER, FOR SOLUTION INTRAMUSCULAR; INTRAVENOUS at 16:08

## 2022-04-06 RX ADMIN — METOPROLOL TARTRATE 25 MG: 25 TABLET, FILM COATED ORAL at 08:13

## 2022-04-06 RX ADMIN — SODIUM CHLORIDE, PRESERVATIVE FREE 10 ML: 5 INJECTION INTRAVENOUS at 16:08

## 2022-04-06 RX ADMIN — ASPIRIN 81 MG: 81 TABLET, CHEWABLE ORAL at 08:13

## 2022-04-06 RX ADMIN — AZITHROMYCIN MONOHYDRATE 500 MG: 500 INJECTION, POWDER, LYOPHILIZED, FOR SOLUTION INTRAVENOUS at 01:24

## 2022-04-06 RX ADMIN — IPRATROPIUM BROMIDE AND ALBUTEROL SULFATE 3 ML: .5; 3 SOLUTION RESPIRATORY (INHALATION) at 10:28

## 2022-04-06 RX ADMIN — BUDESONIDE 250 MCG: 0.25 INHALANT RESPIRATORY (INHALATION) at 19:32

## 2022-04-06 RX ADMIN — METHYLPREDNISOLONE SODIUM SUCCINATE 125 MG: 125 INJECTION, POWDER, FOR SOLUTION INTRAMUSCULAR; INTRAVENOUS at 01:23

## 2022-04-06 RX ADMIN — PANTOPRAZOLE SODIUM 40 MG: 40 TABLET, DELAYED RELEASE ORAL at 08:12

## 2022-04-06 RX ADMIN — SODIUM CHLORIDE, PRESERVATIVE FREE 10 ML: 5 INJECTION INTRAVENOUS at 06:07

## 2022-04-06 RX ADMIN — ARFORMOTEROL TARTRATE 15 MCG: 15 SOLUTION RESPIRATORY (INHALATION) at 19:32

## 2022-04-06 NOTE — ED NOTES
Pt stood in front of chair for 2 minutes wearing NC at 5 L, O2 sats went down to 88%, .  PT stated that she had to sit, respirations labored at that time

## 2022-04-06 NOTE — PROGRESS NOTES
Patient seen and examined  Complains of feeling short of breath  Asks if ibuprofen can be resumed  BP on lower side. Will hold metoprolol. Continue empiric antibiotic and breathing treatments  Continue steroid  Has Candida intertrigo.   Will alter topical ketoconazole  Morbidly obese: BMI 66  Lymphedema

## 2022-04-06 NOTE — PROGRESS NOTES
Bedside shift change report given to Hunter Anguiano [id number] (oncoming nurse) by Robina Lai (offgoing nurse).   Report included the following information SBAR, Kardex and [facility]

## 2022-04-06 NOTE — PROGRESS NOTES
Problem: Falls - Risk of  Goal: *Absence of Falls  Description: Document Reva Garcia Fall Risk and appropriate interventions in the flowsheet. Outcome: Progressing Towards Goal  Note: Fall Risk Interventions:  Mobility Interventions: Patient to call before getting OOB         Medication Interventions: Teach patient to arise slowly,Patient to call before getting OOB                   Problem: Patient Education: Go to Patient Education Activity  Goal: Patient/Family Education  Outcome: Progressing Towards Goal     Problem: Pressure Injury - Risk of  Goal: *Prevention of pressure injury  Description: Document Jeffrey Scale and appropriate interventions in the flowsheet. Outcome: Progressing Towards Goal  Note: Pressure Injury Interventions:  Sensory Interventions: Assess changes in LOC         Activity Interventions: PT/OT evaluation    Mobility Interventions: HOB 30 degrees or less    Nutrition Interventions: Document food/fluid/supplement intake                     Problem: Patient Education: Go to Patient Education Activity  Goal: Patient/Family Education  Outcome: Progressing Towards Goal     Problem: Risk for Spread of Infection  Goal: Prevent transmission of infectious organism to others  Description: Prevent the transmission of infectious organisms to other patients, staff members, and visitors.   Outcome: Progressing Towards Goal     Problem: Patient Education:  Go to Education Activity  Goal: Patient/Family Education  Outcome: Progressing Towards Goal     Problem: Chronic Obstructive Pulmonary Disease (COPD)  Goal: *Oxygen saturation during activity within specified parameters  Outcome: Progressing Towards Goal  Goal: *Able to remain out of bed as prescribed  Outcome: Progressing Towards Goal  Goal: *Absence of hypoxia  Outcome: Progressing Towards Goal  Goal: *Optimize nutritional status  Outcome: Progressing Towards Goal     Problem: Patient Education: Go to Patient Education Activity  Goal: Patient/Family Education  Outcome: Progressing Towards Goal

## 2022-04-06 NOTE — H&P
Hospitalist Admission Note    NAME: Miky Jenkins   :  1970   MRN:  [id number]     Date/Time:  2022 12:23 AM    Patient PCP: Cindy Abbasi MD  ______________________________________________________________________  Given the patient's current clinical presentation, I have a high level of concern for decompensation if discharged from the emergency department. Complex decision making was performed, which includes reviewing the patient's available past medical records, laboratory results, and x-ray films. My assessment of this patient's clinical condition and my plan of care is as follows.     Assessment / Plan:    Acute on chronic hypoxic and hypercapnic respiratory failure, POA  COPD exacerbation, POA  -History of COPD chronically on oxygen 5 L  -Presented with worsening cough, shortness of breath and dyspnea on exertion  -Was found to be hypoxic and hypercapnic in the ED  -Currently she is back on 5 L  -We will start methylprednisolone 40 mg IV every 8 hours, changed to p.o. once more clinical improvement is evident  -Start DuoNeb  -Start budesonide/arformoterol  -Start azithromycin for COPD exacerbation  -Supportive care  -Chest x-ray with pulmonary venous congestion without overt pulmonary edema or other acute finding    Diabetes mellitus type 2  -Continue Lantus  -Add correction scale with hypoglycemia protocol    Hypertension  -Continue metoprolol  -Hold spironolactone given creatinine is elevated    Elevated creatinine  -Does not meet criteria for VIVIANA  -Hold spironolactone for now  -Follow BMP    Morbid obesity  -Counseling was provided for weight loss    Dyslipidemia  -Continue home statin    Chronic diastolic heart failure  -proBNP is not elevated  -She looks euvolemic  -Continue home meds    Coronary artery disease  -Continue home meds    History of breast cancer with L2 sclerotic lesion on previous imaging studies  -We will need follow-up as an out    Former tobacco smoker  -She reported that she quit 4 years ago    Code Status: Full code  Surrogate Decision Maker: Her sister-in-law and her son    DVT Prophylaxis: Lovenox  GI Prophylaxis: not indicated    Baseline: Active, chronically on oxygen, uses a walker      Subjective:   CHIEF COMPLAINT: Shortness of breath    HISTORY OF PRESENT ILLNESS:       The patient is 54years old woman with past medical history significant for COPD chronically on oxygen, hypertension, hyperlipidemia, coronary artery disease presented to the emergency department due to worsening dyspnea and shortness of breath. She also reported that she has been coughing for the last 2 days. She said her worsening dyspnea got worse today for which she decided to come the emergency department. Patient denies any chest pain, abdominal pain, nausea, vomiting, diarrhea, recent travel, sick contact, fevers or chills. She reports that she quit smoking 4 years ago. In the emergency department, she was found to be hypoxic and hypercapnic. We were asked to admit for work up and evaluation of the above problems.      Past Medical History:   Diagnosis Date    Arthritis     Asthma     Breast lump     CAD (coronary artery disease)     Cancer (HCC)     breast cancer    Congestive heart failure (HCC)     COPD (chronic obstructive pulmonary disease) (HCC)     Diabetes [facility]     Hypertension     Morbid obesity with BMI of 70 and over, [facility]     NSTEMI (non-ST elevated myocardial infarction) [facility]     NSTEMI (non-ST elevated myocardial infarction) ([facility] 2012    SV[facility]ra[facility]ricular tachycardia) [facility]         Past Surgical History:   Procedure Laterality Date    HX  SECTION      HX ORTHOPAEDIC      HX OTHER SURGICAL      cyst removed from back    CA CARDIAC SURG PROCEDURE UNLIST      Stents       Social History     Tobacco Use    Smoking status: Former Smoker     Packs/day: 0.25     Types: Cigarettes    Smokeless tobacco: Never Used    Tobacco comment: Patient states \"I  aint smoking no more d*mn cigarettes after yesterday\" 01/26/2018   Substance Use Topics    Alcohol use: No        Family History   Problem Relation Age of Onset    Heart Disease Mother     Heart Disease Father     Heart Disease Sister     Breast Cancer Maternal Grandmother     Breast Cancer Paternal Grandmother      No Known Allergies     Prior to Admission medications    Medication Sig Start Date End Date Taking? Authorizing Provider   albuterol (Ventolin HFA) 90 mcg/actuation inhaler Take 2 Puffs by inhalation every six (6) hours as needed for Wheezing. 2/23/21   Cali Carr MD   spironolactone (ALDACTONE) 25 mg tablet Take 1 Tab by mouth daily. 8/21/20   Tessa Carr MD   cyclobenzaprine (FLEXERIL) 5 mg tablet Take 5 mg by mouth two (2) times a day. Provider, Historical   fluticasone-umeclidinium-vilanterol (Trelegy Ellipta) 100-62.5-25 mcg inhaler Take 1 Puff by inhalation daily. Provider, Historical   NovoLOG Flexpen U-100 Insulin 100 unit/mL (3 mL) inpn 5-10 Units by SubCUTAneous route as directed. Sliding scale: -300: 5 units -400 units: 10 units 6/11/20   Provider, Historical   ammonium lactate (LAC-HYDRIN) 12 % lotion Apply 1 Applicator to affected area as needed. rub in to affected area well    Provider, Historical   omeprazole (PRILOSEC) 40 mg capsule Take 40 mg by mouth daily. Provider, Historical   nystatin (MYCOSTATIN) topical cream Apply  to affected area two (2) times a day. 9/16/19   Bin FERREIRA MD   albuterol-ipratropium (DUO-NEB) 2.5 mg-0.5 mg/3 ml nebu 3 mL by Nebulization route four (4) times daily. 3/3/19   Lori Case MD   furosemide (LASIX) 40 mg tablet Take 40 mg by mouth two (2) times a day. Other, MD Landon   fluticasone (FLONASE) 50 mcg/actuation nasal spray 2 Sprays by Both Nostrils route daily.  1/6/19   Timmothy MD Jack   insulin glargine (LANTUS) 100 unit/mL injection 35 Units by SubCUTAneous route daily. Provider, Historical   metoprolol tartrate (LOPRESSOR) 25 mg tablet Take 25 mg by mouth two (2) times a day. Provider, Historical   loratadine (CLARITIN) 10 mg tablet Take 10 mg by mouth daily. Provider, Historical   aspirin 81 mg chewable tablet Take 81 mg by mouth daily. Provider, Historical   lovastatin (MEVACOR) 40 mg tablet Take 1 Tab by mouth nightly. 1/14/16   Angeles Ma NP   glyBURIDE (DIABETA) 5 mg tablet Take 5 mg by mouth two (2) times daily (with meals). Provider, Historical   nitroglycerin (NITROSTAT) 0.4 mg SL tablet 1 Tab by SubLINGual route every five (5) minutes as needed for Chest Pain (call 911 if not relieved by 3). 9/12/13   Angeles Ma NP       REVIEW OF SYSTEMS:     I am not able to complete the review of systems because:    The patient is intubated and sedated    The patient has altered mental status due to his acute medical problems    The patient has baseline aphasia from prior stroke(s)    The patient has baseline dementia and is not reliable historian    The patient is in acute medical distress and unable to provide information           Total of 12 systems reviewed as follows:       POSITIVE= underlined text  Negative = text not underlined  General:  fever, chills, sweats, generalized weakness, weight loss/gain,      loss of appetite   Eyes:    blurred vision, eye pain, loss of vision, double vision  ENT:    rhinorrhea, pharyngitis   Respiratory:   cough, sputum production, SOB, RAZO, wheezing, pleuritic pain   Cardiology:   chest pain, palpitations, orthopnea, PND, edema, syncope   Gastrointestinal:  abdominal pain , N/V, diarrhea, dysphagia, constipation, bleeding   Genitourinary:  frequency, urgency, dysuria, hematuria, incontinence   Muskuloskeletal :  arthralgia, myalgia, back pain  Hematology:  easy bruising, nose or gum bleeding, lymphadenopathy   Dermatological: rash, ulceration, pruritis, color change / jaundice  Endocrine:   hot flashes or polydipsia   Neurological:  headache, dizziness, confusion, focal weakness, paresthesia,     Speech difficulties, memory loss, gait difficulty  Psychological: Feelings of anxiety, depression, agitation    Objective:   VITALS:    Visit Vitals  /69   Pulse (!) 106   Temp 97.3 °F (36.3 °C)   Resp 19   Ht 5' 6\" (1.676 m)   Wt (!) 186.9 kg (412 lb)   SpO2 90%   BMI 66.50 kg/m²       PHYSICAL EXAM:    General:    Alert, cooperative, no distress, appears stated age. HEENT: Atraumatic, anicteric sclerae, pink conjunctivae     No oral ulcers, mucosa moist, throat clear, dentition fair  Neck:  Supple, symmetrical,  thyroid: non tender  Lungs:   Bilateral wheezing. Chest wall:  No tenderness  No Accessory muscle use. Heart:   Regular  rhythm,  No  murmur   No edema  Abdomen:   Soft, non-tender. Not distended. Bowel sounds normal  Extremities: No cyanosis. No clubbing,      Skin turgor normal, Capillary refill normal, Radial dial pulse 2+  Skin:     Not pale. Not Jaundiced  No rashes   Psych:  Good insight. Not depressed. Not anxious or agitated. Neurologic: EOMs intact. No facial asymmetry. No aphasia or slurred speech. Symmetrical strength, Sensation grossly intact.  Alert and oriented X 4.     _______________________________________________________________________  Care Plan discussed with:    Comments   Patient x    Family      RN x    Care Manager                    Consultant:      _______________________________________________________________________  Expected  Disposition:   Home with Family x   HH/PT/OT/RN    SNF/LTC    LUIS A    ________________________________________________________________________  TOTAL TIME:  72 Minutes    Critical Care Provided     Minutes non procedure based      Comments    x Reviewed previous records   >50% of visit spent in counseling and coordination of care x Discussion with patient and/or family and questions answered ________________________________________________________________________  Signed: Paulina Shearer MD    Procedures: see electronic medical records for all procedures/Xrays and details which were not copied into this note but were reviewed prior to creation of Plan. LAB DATA REVIEWED:    Recent Results (from the past 24 hour(s))   CBC WITH AUTOMATED DIFF    Collection Time: 04/05/22  7:20 PM   Result Value Ref Range    WBC 9.2 3.6 - 11.0 K/uL    RBC 4.67 3.80 - 5.20 M/uL    HGB 12.9 11.5 - 16.0 g/dL    HCT 44.1 35.0 - 47.0 %    MCV 94.4 80.0 - 99.0 FL    MCH 27.6 26.0 - 34.0 PG    MCHC 29.3 (L) 30.0 - 36.5 g/dL    RDW 12.7 11.5 - 14.5 %    PLATELET 830 499 - 413 K/uL    MPV 10.5 8.9 - 12.9 FL    NRBC 0.0 0  WBC    ABSOLUTE NRBC 0.00 0.00 - 0.01 K/uL    NEUTROPHILS 79 (H) 32 - 75 %    LYMPHOCYTES 12 12 - 49 %    MONOCYTES 8 5 - 13 %    EOSINOPHILS 1 0 - 7 %    BASOPHILS 0 0 - 1 %    IMMATURE GRANULOCYTES 0 0.0 - 0.5 %    ABS. NEUTROPHILS 7.2 1.8 - 8.0 K/UL    ABS. LYMPHOCYTES 1.1 0.8 - 3.5 K/UL    ABS. MONOCYTES 0.7 0.0 - 1.0 K/UL    ABS. EOSINOPHILS 0.1 0.0 - 0.4 K/UL    ABS. BASOPHILS 0.0 0.0 - 0.1 K/UL    ABS. IMM. GRANS. 0.0 0.00 - 0.04 K/UL    DF AUTOMATED     METABOLIC PANEL, COMPREHENSIVE    Collection Time: 04/05/22  7:20 PM   Result Value Ref Range    Sodium 138 136 - 145 mmol/L    Potassium 4.3 3.5 - 5.1 mmol/L    Chloride 101 97 - 108 mmol/L    CO2 36 (H) 21 - 32 mmol/L    Anion gap 1 (L) 5 - 15 mmol/L    Glucose 132 (H) 65 - 100 mg/dL    BUN 20 6 - 20 MG/DL    Creatinine 1.05 (H) 0.55 - 1.02 MG/DL    BUN/Creatinine ratio 19 12 - 20      GFR est AA >60 >60 ml/min/1.73m2    GFR est non-AA 56 (L) >60 ml/min/1.73m2    Calcium 10.8 (H) 8.5 - 10.1 MG/DL    Bilirubin, total 0.3 0.2 - 1.0 MG/DL    ALT (SGPT) 26 12 - 78 U/L    AST (SGOT) 19 15 - 37 U/L    Alk.  phosphatase 141 (H) 45 - 117 U/L    Protein, total 8.4 (H) 6.4 - 8.2 g/dL    Albumin 3.3 (L) 3.5 - 5.0 g/dL    Globulin 5.1 (H) 2.0 - 4.0 g/dL A-G Ratio 0.6 (L) 1.1 - 2.2     TROPONIN-HIGH SENSITIVITY    Collection Time: 04/05/22  7:20 PM   Result Value Ref Range    Troponin-High Sensitivity 18 0 - 51 ng/L   NT-PRO BNP    Collection Time: 04/05/22  7:20 PM   Result Value Ref Range    NT pro- (H) <125 PG/ML   EKG, 12 LEAD, INITIAL    Collection Time: 04/05/22  7:27 PM   Result Value Ref Range    Ventricular Rate 93 BPM    Atrial Rate 93 BPM    P-R Interval 186 ms    QRS Duration 74 ms    Q-T Interval 354 ms    QTC Calculation (Bezet) 440 ms    Calculated P Axis 78 degrees    Calculated R Axis 74 degrees    Calculated T Axis 42 degrees    Diagnosis       Normal sinus rhythm  Low voltage QRS  When compared with ECG of 18-FEB-2021 21:50,  No significant change was found     BLOOD GAS,CHEM8,LACTIC ACID POC    Collection Time: 04/05/22  8:10 PM   Result Value Ref Range    Calcium, ionized (POC) 1.48 (H) 1.12 - 1.32 mmol/L    BICARBONATE 35 mmol/L    Base excess (POC) 5.7 mmol/L    Sample source VENOUS BLOOD      CO2, POC 35 (H) 19 - 24 MMOL/L    Sodium,  136 - 145 MMOL/L    Potassium, POC 4.3 3.5 - 5.5 MMOL/L    Chloride,  100 - 108 MMOL/L    Glucose,  (H) 74 - 106 MG/DL    Creatinine, POC 1.1 0.6 - 1.3 MG/DL    Lactic Acid (POC) 0.92 0.40 - 2.00 mmol/L    pH, venous (POC) 7.31 (L) 7.32 - 7.42      pCO2, venous (POC) 68.1 (H) 41 - 51 MMHG    pO2, venous (POC) 52 (H) 25 - 40 mmHg

## 2022-04-06 NOTE — PROGRESS NOTES
1- Received perfectserve from nursing that stated: \"Patient c/o pain in her back and legs. She is requesting something for pain. She is asking for something stronger than Tylenol which she said does not work. Benedict Youngates Benedict Call She take flexeril and ibuprofen at home. She describes as dull pain which is chronic. She states that she has arthritis as well as cancer diagnoses with METs. ..looks like she already has flexeril though it is scheduled. .. due at 0900\". Adjusted start time for flexeril for pt to receive dose now.

## 2022-04-07 LAB
ALBUMIN SERPL-MCNC: 3 G/DL (ref 3.5–5)
ALBUMIN/GLOB SERPL: 0.7 {RATIO} (ref 1.1–2.2)
ALP SERPL-CCNC: 125 U/L (ref 45–117)
ALT SERPL-CCNC: 23 U/L (ref 12–78)
ANION GAP SERPL CALC-SCNC: 2 MMOL/L (ref 5–15)
AST SERPL-CCNC: 13 U/L (ref 15–37)
BASOPHILS # BLD: 0 K/UL (ref 0–0.1)
BASOPHILS NFR BLD: 0 % (ref 0–1)
BILIRUB SERPL-MCNC: 0.4 MG/DL (ref 0.2–1)
BUN SERPL-MCNC: 22 MG/DL (ref 6–20)
BUN/CREAT SERPL: 23 (ref 12–20)
CALCIUM SERPL-MCNC: 10.6 MG/DL (ref 8.5–10.1)
CHLORIDE SERPL-SCNC: 100 MMOL/L (ref 97–108)
CO2 SERPL-SCNC: 33 MMOL/L (ref 21–32)
CREAT SERPL-MCNC: 0.95 MG/DL (ref 0.55–1.02)
DIFFERENTIAL METHOD BLD: ABNORMAL
EOSINOPHIL # BLD: 0 K/UL (ref 0–0.4)
EOSINOPHIL NFR BLD: 0 % (ref 0–7)
ERYTHROCYTE [DISTWIDTH] IN BLOOD BY AUTOMATED COUNT: 12.9 % (ref 11.5–14.5)
GLOBULIN SER CALC-MCNC: 4.5 G/DL (ref 2–4)
GLUCOSE BLD STRIP.AUTO-MCNC: 194 MG/DL (ref 65–117)
GLUCOSE BLD STRIP.AUTO-MCNC: 221 MG/DL (ref 65–117)
GLUCOSE BLD STRIP.AUTO-MCNC: 233 MG/DL (ref 65–117)
GLUCOSE BLD STRIP.AUTO-MCNC: 272 MG/DL (ref 65–117)
GLUCOSE SERPL-MCNC: 248 MG/DL (ref 65–100)
HCT VFR BLD AUTO: 38.6 % (ref 35–47)
HGB BLD-MCNC: 11.5 G/DL (ref 11.5–16)
IMM GRANULOCYTES # BLD AUTO: 0.1 K/UL (ref 0–0.04)
IMM GRANULOCYTES NFR BLD AUTO: 1 % (ref 0–0.5)
LYMPHOCYTES # BLD: 0.6 K/UL (ref 0.8–3.5)
LYMPHOCYTES NFR BLD: 4 % (ref 12–49)
MCH RBC QN AUTO: 27.8 PG (ref 26–34)
MCHC RBC AUTO-ENTMCNC: 29.8 G/DL (ref 30–36.5)
MCV RBC AUTO: 93.5 FL (ref 80–99)
MONOCYTES # BLD: 0.4 K/UL (ref 0–1)
MONOCYTES NFR BLD: 3 % (ref 5–13)
NEUTS SEG # BLD: 13.6 K/UL (ref 1.8–8)
NEUTS SEG NFR BLD: 92 % (ref 32–75)
NRBC # BLD: 0 K/UL (ref 0–0.01)
NRBC BLD-RTO: 0 PER 100 WBC
PLATELET # BLD AUTO: 174 K/UL (ref 150–400)
PMV BLD AUTO: 10.9 FL (ref 8.9–12.9)
POTASSIUM SERPL-SCNC: 4.6 MMOL/L (ref 3.5–5.1)
PROCALCITONIN SERPL-MCNC: 0.1 NG/ML
PROT SERPL-MCNC: 7.5 G/DL (ref 6.4–8.2)
RBC # BLD AUTO: 4.13 M/UL (ref 3.8–5.2)
RBC MORPH BLD: ABNORMAL
SERVICE CMNT-IMP: ABNORMAL
SODIUM SERPL-SCNC: 135 MMOL/L (ref 136–145)
WBC # BLD AUTO: 14.7 K/UL (ref 3.6–11)

## 2022-04-07 PROCEDURE — 74011250636 HC RX REV CODE- 250/636: Performed by: INTERNAL MEDICINE

## 2022-04-07 PROCEDURE — 94640 AIRWAY INHALATION TREATMENT: CPT

## 2022-04-07 PROCEDURE — 36415 COLL VENOUS BLD VENIPUNCTURE: CPT

## 2022-04-07 PROCEDURE — 77010033678 HC OXYGEN DAILY

## 2022-04-07 PROCEDURE — 94761 N-INVAS EAR/PLS OXIMETRY MLT: CPT

## 2022-04-07 PROCEDURE — 74011000250 HC RX REV CODE- 250: Performed by: INTERNAL MEDICINE

## 2022-04-07 PROCEDURE — 82962 GLUCOSE BLOOD TEST: CPT

## 2022-04-07 PROCEDURE — 65270000029 HC RM PRIVATE

## 2022-04-07 PROCEDURE — 85025 COMPLETE CBC W/AUTO DIFF WBC: CPT

## 2022-04-07 PROCEDURE — 80053 COMPREHEN METABOLIC PANEL: CPT

## 2022-04-07 PROCEDURE — 74011250636 HC RX REV CODE- 250/636: Performed by: STUDENT IN AN ORGANIZED HEALTH CARE EDUCATION/TRAINING PROGRAM

## 2022-04-07 PROCEDURE — 74011250637 HC RX REV CODE- 250/637: Performed by: INTERNAL MEDICINE

## 2022-04-07 PROCEDURE — 84145 PROCALCITONIN (PCT): CPT

## 2022-04-07 PROCEDURE — 74011636637 HC RX REV CODE- 636/637: Performed by: INTERNAL MEDICINE

## 2022-04-07 RX ORDER — INSULIN GLARGINE 100 [IU]/ML
40 INJECTION, SOLUTION SUBCUTANEOUS DAILY
Status: DISCONTINUED | OUTPATIENT
Start: 2022-04-08 | End: 2022-04-11 | Stop reason: HOSPADM

## 2022-04-07 RX ORDER — MORPHINE SULFATE 2 MG/ML
1 INJECTION, SOLUTION INTRAMUSCULAR; INTRAVENOUS
Status: DISCONTINUED | OUTPATIENT
Start: 2022-04-07 | End: 2022-04-11 | Stop reason: HOSPADM

## 2022-04-07 RX ADMIN — INSULIN GLARGINE 35 UNITS: 100 INJECTION, SOLUTION SUBCUTANEOUS at 09:14

## 2022-04-07 RX ADMIN — METOPROLOL TARTRATE 25 MG: 25 TABLET, FILM COATED ORAL at 12:30

## 2022-04-07 RX ADMIN — METHYLPREDNISOLONE SODIUM SUCCINATE 40 MG: 40 INJECTION, POWDER, FOR SOLUTION INTRAMUSCULAR; INTRAVENOUS at 05:40

## 2022-04-07 RX ADMIN — IPRATROPIUM BROMIDE AND ALBUTEROL SULFATE 3 ML: .5; 3 SOLUTION RESPIRATORY (INHALATION) at 08:14

## 2022-04-07 RX ADMIN — METHYLPREDNISOLONE SODIUM SUCCINATE 40 MG: 40 INJECTION, POWDER, FOR SOLUTION INTRAMUSCULAR; INTRAVENOUS at 15:35

## 2022-04-07 RX ADMIN — METOPROLOL TARTRATE 25 MG: 25 TABLET, FILM COATED ORAL at 18:56

## 2022-04-07 RX ADMIN — AZITHROMYCIN MONOHYDRATE 500 MG: 500 INJECTION, POWDER, LYOPHILIZED, FOR SOLUTION INTRAVENOUS at 09:11

## 2022-04-07 RX ADMIN — SODIUM CHLORIDE, PRESERVATIVE FREE 10 ML: 5 INJECTION INTRAVENOUS at 05:40

## 2022-04-07 RX ADMIN — IPRATROPIUM BROMIDE AND ALBUTEROL SULFATE 3 ML: .5; 3 SOLUTION RESPIRATORY (INHALATION) at 04:04

## 2022-04-07 RX ADMIN — Medication 3 UNITS: at 09:15

## 2022-04-07 RX ADMIN — ASPIRIN 81 MG: 81 TABLET, CHEWABLE ORAL at 09:11

## 2022-04-07 RX ADMIN — SODIUM CHLORIDE, PRESERVATIVE FREE 10 ML: 5 INJECTION INTRAVENOUS at 22:01

## 2022-04-07 RX ADMIN — METHYLPREDNISOLONE SODIUM SUCCINATE 40 MG: 40 INJECTION, POWDER, FOR SOLUTION INTRAMUSCULAR; INTRAVENOUS at 21:01

## 2022-04-07 RX ADMIN — IPRATROPIUM BROMIDE AND ALBUTEROL SULFATE 3 ML: .5; 3 SOLUTION RESPIRATORY (INHALATION) at 12:32

## 2022-04-07 RX ADMIN — Medication 2 UNITS: at 16:55

## 2022-04-07 RX ADMIN — SODIUM CHLORIDE, PRESERVATIVE FREE 10 ML: 5 INJECTION INTRAVENOUS at 15:35

## 2022-04-07 RX ADMIN — IPRATROPIUM BROMIDE AND ALBUTEROL SULFATE 3 ML: .5; 3 SOLUTION RESPIRATORY (INHALATION) at 16:31

## 2022-04-07 RX ADMIN — MORPHINE SULFATE 1 MG: 2 INJECTION, SOLUTION INTRAMUSCULAR; INTRAVENOUS at 22:38

## 2022-04-07 RX ADMIN — IPRATROPIUM BROMIDE AND ALBUTEROL SULFATE 3 ML: .5; 3 SOLUTION RESPIRATORY (INHALATION) at 21:03

## 2022-04-07 RX ADMIN — BUDESONIDE 250 MCG: 0.25 INHALANT RESPIRATORY (INHALATION) at 21:03

## 2022-04-07 RX ADMIN — ARFORMOTEROL TARTRATE 15 MCG: 15 SOLUTION RESPIRATORY (INHALATION) at 08:15

## 2022-04-07 RX ADMIN — Medication 5 UNITS: at 12:31

## 2022-04-07 RX ADMIN — BUDESONIDE 250 MCG: 0.25 INHALANT RESPIRATORY (INHALATION) at 08:14

## 2022-04-07 RX ADMIN — ATORVASTATIN CALCIUM 10 MG: 10 TABLET, FILM COATED ORAL at 09:21

## 2022-04-07 RX ADMIN — PANTOPRAZOLE SODIUM 40 MG: 40 TABLET, DELAYED RELEASE ORAL at 09:11

## 2022-04-07 RX ADMIN — ARFORMOTEROL TARTRATE 15 MCG: 15 SOLUTION RESPIRATORY (INHALATION) at 21:03

## 2022-04-07 RX ADMIN — ENOXAPARIN SODIUM 40 MG: 40 INJECTION SUBCUTANEOUS at 09:11

## 2022-04-07 RX ADMIN — CLOTRIMAZOLE: 10 CREAM TOPICAL at 09:13

## 2022-04-07 RX ADMIN — Medication 2 UNITS: at 22:00

## 2022-04-07 RX ADMIN — ENOXAPARIN SODIUM 40 MG: 40 INJECTION SUBCUTANEOUS at 21:01

## 2022-04-07 NOTE — PROGRESS NOTES
End of Shift Note    Bedside shift change report given to Guanako GREEN (oncoming nurse) by Leonarda Smith RN (offgoing nurse). Report included the following information SBAR, Kardex, Intake/Output, MAR, Accordion, Recent Results and Med Rec Status    Shift worked:  7PM-7AM     Shift summary and any significant changes:      Pt requesting  Ibuprofen  Instead of oxycodone. Perfect served and orders not   Provided since the pt  has HF and CAD. Pt  Informed.      Concerns for physician to address:  As above     Zone phone for oncoming shift:        Leonarda Smith RN

## 2022-04-07 NOTE — PROGRESS NOTES
Transition of Care Plan:    RUR:  11% \"low risk\"  Disposition: Home with follow-up appts  Follow up appointments: PCP & specialists as indicated  DME needed: TBD- pt has bsc, shower chair, scooter, rollator, hospital bed, w/c  Transportation at Discharge: Bariatric BLS transport (Medicaid vs Medicare)  Keys or means to access home: Yes   IM Medicare Letter: Needed at d/c  Is patient a BCPI-A Bundle:  No   If yes, was Bundle Letter given?:  N/A  Is patient a  and connected with the [place]? No               If yes, was [place] transfer form completed and [facility] notified? N/A  Caregiver Contact: Friend/ POA- Cecile Cervantes, [phone]  Discharge Caregiver contacted prior to discharge? Care Conference needed?:                   Reviewed pt's chart. Met with pt at bedside to introduce role & complete initial assessment. Pt confirmed demographic information is up to date. Pt reports living with her nephew in a single level/ 5 [address] house. Pt does not drive. Pt's nephews & son assist with ADLs. Pt is primarily bedbound and uses Medicaid stretcher transportation. Hx of HH (At [facility]). Denies hx of SNF or IPR. Has BSC, shower chair, scooter, rollator, hospital bed, and 5LPM continuous home O2 ([employer]) at home. Preferred Rx is [facility] ([address]). Has Medicare A&B and [facility] Medicaid. PT/OT evals pending but pt reports not wanting/ needing HH at this time. Stretcher transport needed at discharge. Will continue to follow.     Reason for Admission:  Acute on chronic respiratory failure with hypoxia and hypercapnia (HCC); COPD exacerbation                   RUR Score:    11%           Plan for utilizing home health:    PT/OT evals pending    PCP: First and Last name:  Yanique Rogel MD   [facility] of Practice[facility]   Are you a current patient: Yes/No: Yes   Approximate date of last visit: Within 6 months   Can you participate in a virtual visit with your PCP: Yes- only if family is available to provide support                    Current Advanced Directive/Advance Care Plan: Full Code    Advance Care Planning   Healthcare Decision Maker:       Primary Decision Maker: Gregory York - [phone]    Secondary Decision Maker: Jackie Christina - [phone]    Click here to complete [address] including selection of the Healthcare Decision Maker Relationship (ie \"Primary\")  Today we documented Decision Maker(s) consistent with ACP documents on file. Care Management Interventions  PCP Verified by CM:  Yes  Mode of Transport at Discharge: BLS (bariatric transport)  Transition of Care Consult (CM Consult): Discharge Planning  Discharge Durable Medical Equipment: No  Physical Therapy Consult: Yes  Occupational Therapy Consult: Yes  Speech Therapy Consult: No  Support Systems: Other Family Member(s),Child(kole)  Confirm Follow Up Transport: Other (see comment)  The Plan for Transition of Care is Related to the Following Treatment Goals : Home with follow-up appts  The Patient and/or Patient Representative was Provided with a Choice of Provider and Agrees with the Discharge Plan?: Yes  Discharge Location  Patient Expects to be Discharged to[de-identified] Home with family assistance    ROSCOE Alexander  Care Management

## 2022-04-07 NOTE — PROGRESS NOTES
Hospitalist Progress Note    NAME: Dania Keys   :  1970   MRN:  [id number]       Assessment / Plan:  Acute on chronic hypoxic and hypercapnic respiratory failure, POA  COPD exacerbation, POA  History of COPD chronically on oxygen 5 L. Presented with worsening cough, shortness of breath and dyspnea on exertion. Found to be hypoxic and hypercapnic in the ED  Chest x-ray with pulmonary venous congestion without overt pulmonary edema or other acute finding  procal 0.1  Cont' methylprednisolone 40 mg IV every 8 hours, changed to p.o. once more clinical improvement is evident  DuoNeb, budesonide/arformoterol  Empiric azithromycin for COPD exacerbation  Wean O2 as tolerated.     Diabetes mellitus type 2  Continue Lantus  SSI     Hypertension  Continue metoprolol  Hold spironolactone given creatinine is elevated     Elevated creatinine, improved  Does not meet criteria for VIVIANA  Hold spironolactone for now  Follow BMP     Morbid obesity  Counseling was provided for weight loss     Dyslipidemia  Continue home statin     Chronic diastolic heart failure  CAD  proBNP is not elevated  She looks euvolemic  Continue home meds      History of breast cancer with L2 sclerotic lesion on previous imaging studies  will need follow-up as an out     Former tobacco smoker  She reported that she quit 4 years ago     Hx of breast CA  No rx yet. She states she is supposed to f/u with Dr Magnolia Rubi and is requesting to see him inpt. I spoke to Dr Nancey Opitz who is currently out of town. Will have pt f/u outpt as scheduled. Code Status: Full code  Surrogate Decision Maker: Her sister-in-law and her son   DVT Prophylaxis: Lovenox  GI Prophylaxis: not indicated   Baseline: Active, chronically on oxygen, uses a walker     Subjective:     Chief Complaint / Reason for Physician Visit  NAD. Remains on 6LNC. Pt is NAD. Discussed with RN events overnight.      Review of Systems:  Symptom Y/N Comments  Symptom Y/N Comments Fever/Chills    Chest Pain     Poor Appetite    Edema     Cough    Abdominal Pain     Sputum    Joint Pain     SOB/RAZO    Pruritis/Rash     Nausea/vomit    Tolerating PT/OT     Diarrhea    Tolerating Diet     Constipation    Other       Could NOT obtain due to:      Objective:     VITALS:   Last 24hrs VS reviewed since prior progress note. Most recent are:  Patient Vitals for the past 24 hrs:   Temp Pulse Resp BP SpO2   04/07/22 0901 98.7 °F (37.1 °C) 88 18 116/60 96 %   04/07/22 0815 -- -- -- -- 90 %   04/07/22 0404 -- -- -- -- 96 %   04/07/22 0300 97.9 °F (36.6 °C) 77 18 (!) 111/59 96 %   04/06/22 2338 -- -- -- -- 98 %   04/06/22 2332 98 °F (36.7 °C) 88 18 (!) 112/56 94 %   04/06/22 1927 97.8 °F (36.6 °C) 91 18 (!) 111/55 95 %   04/06/22 1926 -- -- -- -- 95 %   04/06/22 1614 -- -- -- -- 94 %   04/06/22 1239 -- 88 -- (!) 101/48 91 %   04/06/22 1237 -- -- -- -- 96 %   04/06/22 1234 97.4 °F (36.3 °C) -- -- -- --   04/06/22 1130 -- 90 15 (!) 111/58 97 %   04/06/22 1025 -- -- -- -- 92 %   04/06/22 1000 -- 81 15 (!) 109/54 92 %     No intake or output data in the 24 hours ending 04/07/22 0909     I had a face to face encounter and independently examined this patient on 4/7/2022, as outlined below:  PHYSICAL EXAM:  General: WD, WN. Alert, cooperative, no acute distress    EENT:  EOMI. Anicteric sclerae. MMM  Resp:  CTA bilaterally, no wheezing or rales. No accessory muscle use  CV:  Regular  rhythm,  No edema  GI:  Soft, Non distended, Non tender. +Bowel sounds  Neurologic:  Alert and oriented X 3, normal speech  Psych:   Good insight.  Not anxious nor agitated  Skin:  No jaundice    Reviewed most current lab test results and cultures  YES  Reviewed most current radiology test results   YES  Review and summation of old records today    NO  Reviewed patient's current orders and MAR    YES  PMH/SH reviewed - no change compared to H&P  ________________________________________________________________________  Care Plan discussed with:    Comments   Patient x    Family      RN x    Care Manager     Consultant                        Multidiciplinary team rounds were held today with , nursing, pharmacist and clinical coordinator. Patient's plan of care was discussed; medications were reviewed and discharge planning was addressed. ________________________________________________________________________  Total NON critical care TIME:  35   Minutes    Total CRITICAL CARE TIME Spent:   Minutes non procedure based      Comments   >50% of visit spent in counseling and coordination of care     ________________________________________________________________________  Naga Pritchard MD     Procedures: see electronic medical records for all procedures/Xrays and details which were not copied into this note but were reviewed prior to creation of Plan. LABS:  I reviewed today's most current labs and imaging studies.   Pertinent labs include:  Recent Labs     04/07/22 0424 04/05/22 1920   WBC 14.7* 9.2   HGB 11.5 12.9   HCT 38.6 44.1    164     Recent Labs     04/07/22 0424 04/05/22  1920   * 138   K 4.6 4.3    101   CO2 33* 36*   * 132*   BUN 22* 20   CREA 0.95 1.05*   CA 10.6* 10.8*   ALB 3.0* 3.3*   TBILI 0.4 0.3   ALT 23 26       Signed: Naga Pritchard MD

## 2022-04-08 LAB
GLUCOSE BLD STRIP.AUTO-MCNC: 111 MG/DL (ref 65–117)
GLUCOSE BLD STRIP.AUTO-MCNC: 166 MG/DL (ref 65–117)
GLUCOSE BLD STRIP.AUTO-MCNC: 194 MG/DL (ref 65–117)
GLUCOSE BLD STRIP.AUTO-MCNC: 204 MG/DL (ref 65–117)
GLUCOSE BLD STRIP.AUTO-MCNC: 205 MG/DL (ref 65–117)
SERVICE CMNT-IMP: ABNORMAL
SERVICE CMNT-IMP: NORMAL

## 2022-04-08 PROCEDURE — 77030027138 HC INCENT SPIROMETER -A

## 2022-04-08 PROCEDURE — 77010033678 HC OXYGEN DAILY

## 2022-04-08 PROCEDURE — 82962 GLUCOSE BLOOD TEST: CPT

## 2022-04-08 PROCEDURE — 74011636637 HC RX REV CODE- 636/637: Performed by: INTERNAL MEDICINE

## 2022-04-08 PROCEDURE — 74011250636 HC RX REV CODE- 250/636: Performed by: STUDENT IN AN ORGANIZED HEALTH CARE EDUCATION/TRAINING PROGRAM

## 2022-04-08 PROCEDURE — 74011000250 HC RX REV CODE- 250: Performed by: INTERNAL MEDICINE

## 2022-04-08 PROCEDURE — 65270000029 HC RM PRIVATE

## 2022-04-08 PROCEDURE — 77030029684 HC NEB SM VOL KT MONA -A

## 2022-04-08 PROCEDURE — 94761 N-INVAS EAR/PLS OXIMETRY MLT: CPT

## 2022-04-08 PROCEDURE — 74011250637 HC RX REV CODE- 250/637: Performed by: STUDENT IN AN ORGANIZED HEALTH CARE EDUCATION/TRAINING PROGRAM

## 2022-04-08 PROCEDURE — 74011250637 HC RX REV CODE- 250/637: Performed by: INTERNAL MEDICINE

## 2022-04-08 PROCEDURE — 94640 AIRWAY INHALATION TREATMENT: CPT

## 2022-04-08 PROCEDURE — 2709999900 HC NON-CHARGEABLE SUPPLY

## 2022-04-08 PROCEDURE — 74011250636 HC RX REV CODE- 250/636: Performed by: INTERNAL MEDICINE

## 2022-04-08 RX ORDER — MICONAZOLE NITRATE 20 MG/G
CREAM TOPICAL 2 TIMES DAILY
Status: DISCONTINUED | OUTPATIENT
Start: 2022-04-08 | End: 2022-04-11 | Stop reason: HOSPADM

## 2022-04-08 RX ORDER — AMMONIUM LACTATE 12 G/100G
LOTION TOPICAL 2 TIMES DAILY
Status: DISCONTINUED | OUTPATIENT
Start: 2022-04-08 | End: 2022-04-09

## 2022-04-08 RX ORDER — IPRATROPIUM BROMIDE AND ALBUTEROL SULFATE 2.5; .5 MG/3ML; MG/3ML
3 SOLUTION RESPIRATORY (INHALATION)
Status: DISCONTINUED | OUTPATIENT
Start: 2022-04-08 | End: 2022-04-11 | Stop reason: HOSPADM

## 2022-04-08 RX ORDER — IPRATROPIUM BROMIDE AND ALBUTEROL SULFATE 2.5; .5 MG/3ML; MG/3ML
3 SOLUTION RESPIRATORY (INHALATION)
Status: DISCONTINUED | OUTPATIENT
Start: 2022-04-08 | End: 2022-04-09

## 2022-04-08 RX ADMIN — Medication 2 UNITS: at 09:56

## 2022-04-08 RX ADMIN — Medication: at 12:52

## 2022-04-08 RX ADMIN — SODIUM CHLORIDE, PRESERVATIVE FREE 10 ML: 5 INJECTION INTRAVENOUS at 05:55

## 2022-04-08 RX ADMIN — ARFORMOTEROL TARTRATE 15 MCG: 15 SOLUTION RESPIRATORY (INHALATION) at 07:39

## 2022-04-08 RX ADMIN — METHYLPREDNISOLONE SODIUM SUCCINATE 40 MG: 40 INJECTION, POWDER, FOR SOLUTION INTRAMUSCULAR; INTRAVENOUS at 21:45

## 2022-04-08 RX ADMIN — PANTOPRAZOLE SODIUM 40 MG: 40 TABLET, DELAYED RELEASE ORAL at 09:59

## 2022-04-08 RX ADMIN — AZITHROMYCIN MONOHYDRATE 500 MG: 500 INJECTION, POWDER, LYOPHILIZED, FOR SOLUTION INTRAVENOUS at 09:51

## 2022-04-08 RX ADMIN — MICONAZOLE NITRATE: 20 CREAM TOPICAL at 12:52

## 2022-04-08 RX ADMIN — METOPROLOL TARTRATE 25 MG: 25 TABLET, FILM COATED ORAL at 18:10

## 2022-04-08 RX ADMIN — MORPHINE SULFATE 1 MG: 2 INJECTION, SOLUTION INTRAMUSCULAR; INTRAVENOUS at 16:42

## 2022-04-08 RX ADMIN — IPRATROPIUM BROMIDE AND ALBUTEROL SULFATE 3 ML: .5; 3 SOLUTION RESPIRATORY (INHALATION) at 07:34

## 2022-04-08 RX ADMIN — Medication: at 16:44

## 2022-04-08 RX ADMIN — MICONAZOLE NITRATE: 20 CREAM TOPICAL at 21:46

## 2022-04-08 RX ADMIN — CYCLOBENZAPRINE 5 MG: 10 TABLET, FILM COATED ORAL at 12:46

## 2022-04-08 RX ADMIN — ENOXAPARIN SODIUM 40 MG: 40 INJECTION SUBCUTANEOUS at 09:59

## 2022-04-08 RX ADMIN — ASPIRIN 81 MG: 81 TABLET, CHEWABLE ORAL at 09:59

## 2022-04-08 RX ADMIN — INSULIN GLARGINE 40 UNITS: 100 INJECTION, SOLUTION SUBCUTANEOUS at 09:56

## 2022-04-08 RX ADMIN — IPRATROPIUM BROMIDE AND ALBUTEROL SULFATE 3 ML: .5; 3 SOLUTION RESPIRATORY (INHALATION) at 00:09

## 2022-04-08 RX ADMIN — IPRATROPIUM BROMIDE AND ALBUTEROL SULFATE 3 ML: .5; 3 SOLUTION RESPIRATORY (INHALATION) at 21:35

## 2022-04-08 RX ADMIN — IPRATROPIUM BROMIDE AND ALBUTEROL SULFATE 3 ML: .5; 3 SOLUTION RESPIRATORY (INHALATION) at 15:02

## 2022-04-08 RX ADMIN — ENOXAPARIN SODIUM 40 MG: 40 INJECTION SUBCUTANEOUS at 21:45

## 2022-04-08 RX ADMIN — MICONAZOLE NITRATE: 20 CREAM TOPICAL at 16:44

## 2022-04-08 RX ADMIN — METOPROLOL TARTRATE 25 MG: 25 TABLET, FILM COATED ORAL at 09:59

## 2022-04-08 RX ADMIN — SODIUM CHLORIDE, PRESERVATIVE FREE 10 ML: 5 INJECTION INTRAVENOUS at 16:42

## 2022-04-08 RX ADMIN — METHYLPREDNISOLONE SODIUM SUCCINATE 40 MG: 40 INJECTION, POWDER, FOR SOLUTION INTRAMUSCULAR; INTRAVENOUS at 05:55

## 2022-04-08 RX ADMIN — Medication 2 UNITS: at 12:44

## 2022-04-08 RX ADMIN — BUDESONIDE 250 MCG: 0.25 INHALANT RESPIRATORY (INHALATION) at 21:41

## 2022-04-08 RX ADMIN — ARFORMOTEROL TARTRATE 15 MCG: 15 SOLUTION RESPIRATORY (INHALATION) at 21:41

## 2022-04-08 RX ADMIN — SODIUM CHLORIDE, PRESERVATIVE FREE 5 ML: 5 INJECTION INTRAVENOUS at 21:45

## 2022-04-08 RX ADMIN — Medication 2 UNITS: at 21:46

## 2022-04-08 RX ADMIN — BUDESONIDE 250 MCG: 0.25 INHALANT RESPIRATORY (INHALATION) at 07:39

## 2022-04-08 RX ADMIN — ATORVASTATIN CALCIUM 10 MG: 10 TABLET, FILM COATED ORAL at 09:59

## 2022-04-08 RX ADMIN — IPRATROPIUM BROMIDE AND ALBUTEROL SULFATE 3 ML: .5; 3 SOLUTION RESPIRATORY (INHALATION) at 04:34

## 2022-04-08 RX ADMIN — METHYLPREDNISOLONE SODIUM SUCCINATE 40 MG: 40 INJECTION, POWDER, FOR SOLUTION INTRAMUSCULAR; INTRAVENOUS at 16:43

## 2022-04-08 NOTE — PROGRESS NOTES
Pt self removed PIV due to discomfort PIV flushed prior to complaint flushed fine, New dressing applied Pt received entire antibiotic prior to complaint then self removed PIV after completion  Messaged MD Andino to see if we could transition to PO pain medications, steroids, antibiotics. ..

## 2022-04-08 NOTE — PROGRESS NOTES
Physician Progress Note      Nacho Welch  CSN #:                  [id number]  :                       1970  ADMIT DATE:       2022 7:30 PM  [address] [place] DATE:  RESPONDING  PROVIDER #:        Sunny Hahn MD          QUERY TEXT:    Patient admitted with SOB. Noted documentation of Acute on chronic hypoxic and hypercapnic respiratory failure in H&P dated . In order to support the diagnosis of acute respiratory failure, please include additional clinical indicators in your documentation. Or please document if the diagnosis of acute respiratory failure has been ruled out after further study. The medical record reflects the following:  Risk Factors: Hx:  COPD chronically on oxygen@ 5L, hypertension, hyperlipidemia, coronary artery disease  Clinical Indicators: rr: 20-24; 100% on 5L NC. ... Clement Fly cxr: poorly venous congestion w/o overt pulm. edema. .. Clement Fly Clement Fly ED noted: Tachypnea; Decreased air movement present; Decreased breath sounds and wheezing present; B/L LE edema. Clement Fly Clement Fly Clement Fly Noted: No Accessory muscle use. ... Treatment: Supplemental O2 @ 4-6L NC; CXR; Duo-Nebs; IV Lasix; IV Solumedrol;  budesonide/arformoterol nebs; IV Abx    Thank you,    Itzel TORRE    Acute Respiratory Failure Clinical Indicators per [employer] Training Guide and Quick Reference Guide:  pO2 < 60 mmHg or SpO2 (pulse oximetry) < 91% breathing room air  pCO2 > 50 and pH < 7.35  P/F ratio (pO2 / FIO2) < 300  pO2 decrease or pCO2 increase by 10 mmHg from baseline (if known)  Supplemental oxygen of 40% or more  Presence of respiratory distress, tachypnea, dyspnea, shortness of breath, wheezing  Unable to speak in complete sentences  Use of accessory muscles to breathe  Extreme anxiety and feeling of impending doom  Tripod position  Confusion/altered mental status/obtunded  Options provided:  -- Acute Respiratory Failure as evidenced by, Please document evidence.   -- Acute Respiratory Failure ruled out after study  -- Acute Respiratory Failure ruled out after study and Chronic Respiratory Failure confirmed  -- Other - I will add my own diagnosis  -- Disagree - Not applicable / Not valid  -- Disagree - Clinically unable to determine / Unknown  -- Refer to Clinical Documentation Reviewer    PROVIDER RESPONSE TEXT:    Pls see documentation in h&P    Query created by: Akira Islas on 4/7/2022 6:51 PM      Electronically signed by:  Liss Bedoya MD 4/8/2022 2:17 PM

## 2022-04-08 NOTE — PROGRESS NOTES
Physical Therapy Note    Patient is received supine in bed. She states she has been getting up to the bedside commode and bedside chair without assistance. Patient states she uses a powerchair for mobility, has a hospital bed at home, and uses stretcher transport out of the house. No need for PT evaluation. Patient is at baseline.      Dene Schaumann DPT

## 2022-04-08 NOTE — PROGRESS NOTES
ADULT PROTOCOL: JET AEROSOL ASSESSMENT    Patient  Rachel Echevarria     54 y.o.   female     4/8/2022  10:12 AM    Breath Sounds Pre Procedure: Right Breath Sounds: Diminished                               Left Breath Sounds: Diminished    Breath Sounds Post Procedure: Right Breath Sounds: Diminished                                 Left Breath Sounds: Diminished    Breathing pattern: Pre procedure Breathing Pattern: Regular          Post procedure Breathing Pattern: Regular    Heart Rate: Pre procedure Pulse: 76           Post procedure Pulse: 70    Resp Rate: Pre procedure Respirations: 16           Post procedure Respirations: 16      Oxygen: O2 Device: Nasal cannula  6lpm        SpO2: Pre procedure SpO2: 94 %               Post procedure SpO2: 94 %      Nebulizer Therapy: Current medications Aerosolized Medications: Brovana,Pulmicort, duoneb            Problem List:   Patient Active Problem List   Diagnosis Code    Malignant essential hypertension I10    Asthma J45.909    Obesity hypoventilation syndrome (HCC) E66.2    Dyspnea R06.00    Chest pressure R07.89    Acute on chronic diastolic heart failure (HCC) I50.33    S/P PTCA (percutaneous transluminal coronary angioplasty) Z98.61    Coronary atherosclerosis of native coronary artery I25.10    Hypoxia R09.02    Noncompliance with therapeutic plan Z91.11    GERD (gastroesophageal reflux disease) K21.9    Acute respiratory failure with hypoxia and hypercarbia ([facility]) J96.01, J96.02    COPD (chronic obstructive pulmonary disease) ([facility]) J44.9    Tobacco abuse Z72.0    BMI 60.0-69.9, adult ([facility]) Z68.44    Cellulitis L03.90    SIRS due to infectious process with acute organ dysfunction ([facility]) A41.9, R65.20    Sepsis associated hypotension ([facility]) A41.9, I95.9    Gangrenous toe ([facility]) I96    Type II diabetes mellitus, uncontrolled [id number]    HTN (hypertension) P90    Diastolic CHF, chronic ([facility]) I50.32    Cough with hemoptysis R04.2    Lymphedema of both lower extremities I89.0    CAP (community acquired pneumonia) J18.9    Cellulitis, leg L03.119    COPD exacerbation ([facility]) J44.1    Maggot infestation B87.9    Shortness of breath R06.02    Multifocal pneumonia J18.9    Acute respiratory failure (HCC) J96.00    Acute on chronic respiratory failure with hypoxia and hypercapnia ([facility]) J96.21, J96.22    NSVT (nonsustained ventricular tachycardia) ([facility]) I47.2    Respiratory failure ([facility]) J96.90    Elevated lipase R74.8    Abdominal pain R10.9    Counseling regarding goals of care Z71.89    Acute pancreatitis K85.90    Breast cancer ([facility]) C50.919    Intertrigo L30.4    Productive cough R05.8    Other fatigue R53.83    Acute chest pain R07.9    Acute on chronic respiratory failure with hypoxia (HCC) J96.21    Acute on chronic respiratory failure with hypercapnia (HCC) J96.22    Hypercapnic respiratory failure (HCC) J96.92    Acute respiratory failure with hypoxia ([facility]) J96.01       Respiratory Therapist: Precious Mancilla, RT

## 2022-04-08 NOTE — PROGRESS NOTES
Occupational Therapy    OT referral received, chart reviewed, and patient screened for OT needs. Spoke with patient who reports being at/or very near her limited functional baseline of being Independent with self feeding and grooming, mod I for UB ADLs, total A for LB ADLs and mod to max A for toileting. In regard to functional mobility she reports she has been transferring to and from her bed side chair without assistance since being admitted. At baseline patient reports mobilizing in a WC or walking very limited household distances with a RW. for ADLs and functional mobility. No OT needs indicated at this time and patient is in agreement that she is currently without OT needs. Will complete order.     Antwan Armijo, OTR/L

## 2022-04-08 NOTE — PROGRESS NOTES
Hospitalist Progress Note    NAME: Klarissa Fuchs   :  1970   MRN:  [id number]       Assessment / Plan:  Acute on chronic hypoxic and hypercapnic respiratory failure, POA  COPD exacerbation, POA  History of COPD chronically on oxygen 5 L. Presented with worsening cough, shortness of breath and dyspnea on exertion. Found to be hypoxic and hypercapnic in the ED  Chest x-ray with pulmonary venous congestion without overt pulmonary edema or other acute finding  procal 0.1  Cont' methylprednisolone 40 mg IV every 8 hours, plan to change to PO tomorrow  DuoNeb, budesonide/arformoterol  Empiric azithromycin for COPD exacerbation  Wean O2 as tolerated.     Diabetes mellitus type 2  Continue Lantus  SSI     Hypertension  Continue metoprolol  Hold spironolactone given creatinine is elevated     Elevated creatinine, improved  Does not meet criteria for VIVIANA  Hold spironolactone for now  Follow BMP     Morbid obesity  Counseling was provided for weight loss     Dyslipidemia  Continue home statin     Chronic diastolic heart failure  CAD  proBNP is not elevated  She looks euvolemic  Continue home meds except spironolactone      History of breast cancer with L2 sclerotic lesion on previous imaging studies  No rx yet. She states she is supposed to f/u with Dr Rikki Morejon and is requesting to see him inpt. I spoke to Dr Jewell Robbins who is currently out of town. Will have pt f/u outpt as scheduled.     Former tobacco smoker  She reported that she quit 4 years ago         Code Status: Full code  Surrogate Decision Maker: Her sister-in-law and her son   DVT Prophylaxis: Lovenox  GI Prophylaxis: not indicated   Baseline: Active, chronically on oxygen, uses a walker     Subjective:     Chief Complaint / Reason for Physician Visit  NAD. Discussed with RN events overnight.      Review of Systems:  Symptom Y/N Comments  Symptom Y/N Comments   Fever/Chills    Chest Pain     Poor Appetite    Edema     Cough    Abdominal Pain Sputum    Joint Pain     SOB/RAZO    Pruritis/Rash     Nausea/vomit    Tolerating PT/OT     Diarrhea    Tolerating Diet     Constipation    Other       Could NOT obtain due to:      Objective:     VITALS:   Last 24hrs VS reviewed since prior progress note. Most recent are:  Patient Vitals for the past 24 hrs:   Temp Pulse Resp BP SpO2   04/08/22 0814 97.8 °F (36.6 °C) 71 22 (!) 117/49 97 %   04/08/22 0739 -- -- -- -- 94 %   04/08/22 0734 -- -- -- -- 93 %   04/08/22 0435 -- -- -- -- 94 %   04/08/22 0143 98.3 °F (36.8 °C) 94 24 (!) 114/52 94 %   04/08/22 0011 -- -- -- -- 97 %   04/07/22 2251 98.5 °F (36.9 °C) 87 20 123/62 97 %   04/07/22 2103 -- -- -- -- 98 %   04/07/22 1928 98.7 °F (37.1 °C) 73 22 (!) 116/58 98 %   04/07/22 1720 98.6 °F (37 °C) 78 20 130/63 95 %   04/07/22 1351 -- -- -- -- 96 %     No intake or output data in the 24 hours ending 04/08/22 1026     I had a face to face encounter and independently examined this patient on 4/8/2022, as outlined below:  PHYSICAL EXAM:  General: WD, WN. Alert, cooperative, no acute distress    EENT:  EOMI. Anicteric sclerae. MMM  Resp:  CTA bilaterally, no wheezing or rales. No accessory muscle use  CV:  Regular  rhythm,  No edema  GI:  Soft, Non distended, Non tender. +Bowel sounds  Neurologic:  Alert and oriented X 3, normal speech  Psych:   Good insight. Not anxious nor agitated  Skin:  No jaundice    Reviewed most current lab test results and cultures  YES  Reviewed most current radiology test results   YES  Review and summation of old records today    NO  Reviewed patient's current orders and MAR    YES  PMH/SH reviewed - no change compared to H&P  ________________________________________________________________________  Care Plan discussed with:    Comments   Patient x    Family      RN x    Care Manager     Consultant                        Multidiciplinary team rounds were held today with , nursing, pharmacist and clinical coordinator.   Patient's plan of care was discussed; medications were reviewed and discharge planning was addressed. ________________________________________________________________________  Total NON critical care TIME:  35   Minutes    Total CRITICAL CARE TIME Spent:   Minutes non procedure based      Comments   >50% of visit spent in counseling and coordination of care     ________________________________________________________________________  Piter Dukes MD     Procedures: see electronic medical records for all procedures/Xrays and details which were not copied into this note but were reviewed prior to creation of Plan. LABS:  I reviewed today's most current labs and imaging studies.   Pertinent labs include:  Recent Labs     04/07/22 0424 04/05/22 1920   WBC 14.7* 9.2   HGB 11.5 12.9   HCT 38.6 44.1    164     Recent Labs     04/07/22 0424 04/05/22 1920   * 138   K 4.6 4.3    101   CO2 33* 36*   * 132*   BUN 22* 20   CREA 0.95 1.05*   CA 10.6* 10.8*   ALB 3.0* 3.3*   TBILI 0.4 0.3   ALT 23 26       Signed: Piter Dukes MD

## 2022-04-08 NOTE — WOUND CARE
Wound care nurse consult: consult placed by Dr Foster Norman and staff nurse for BLE skin and yeast rash in skin folds of abdomen and breasts. Patient is a morbidly obese (BMI=66) 55 y/o AAF admitted for acute on chronic respiratory failure. Patient is well known to [facility] team for her chronic lymphadema skin issues to BLE's. Past Medical History:   Diagnosis Date    Arthritis     Asthma     Breast lump     CAD (coronary artery disease)     Cancer (HCC)     breast cancer    Congestive heart failure (HCC)     COPD (chronic obstructive pulmonary disease) (HCC)     Diabetes [facility]     Hypertension     Morbid obesity with BMI of 70 and over, adult ([facility]     NSTEMI (non-ST elevated myocardial infarction) ([facility]     NSTEMI (non-ST elevated myocardial infarction) ([facility] 8/18/2012    SVT (supraventricular tachycardia) ([facility]      Patient does NOT have any wounds to BLE's, just thick, crusty dry skin. Will order some lac-Hydrin lotion to be applied BID to clean skin. Skin folds of breasts and abdomen: BID, wash with soap and water (no wipes), pat dry and apply Secura antifungal zinc cream.    Patient refuses a bariatric bed each admission.     Damaso Recinos RN, [employer]

## 2022-04-09 LAB
ANION GAP SERPL CALC-SCNC: 3 MMOL/L (ref 5–15)
BASOPHILS # BLD: 0 K/UL (ref 0–0.1)
BASOPHILS NFR BLD: 0 % (ref 0–1)
BUN SERPL-MCNC: 22 MG/DL (ref 6–20)
BUN/CREAT SERPL: 27 (ref 12–20)
CALCIUM SERPL-MCNC: 10.1 MG/DL (ref 8.5–10.1)
CHLORIDE SERPL-SCNC: 102 MMOL/L (ref 97–108)
CO2 SERPL-SCNC: 29 MMOL/L (ref 21–32)
CREAT SERPL-MCNC: 0.81 MG/DL (ref 0.55–1.02)
DIFFERENTIAL METHOD BLD: ABNORMAL
EOSINOPHIL # BLD: 0 K/UL (ref 0–0.4)
EOSINOPHIL NFR BLD: 0 % (ref 0–7)
ERYTHROCYTE [DISTWIDTH] IN BLOOD BY AUTOMATED COUNT: 12.9 % (ref 11.5–14.5)
GLUCOSE BLD STRIP.AUTO-MCNC: 196 MG/DL (ref 65–117)
GLUCOSE BLD STRIP.AUTO-MCNC: 240 MG/DL (ref 65–117)
GLUCOSE BLD STRIP.AUTO-MCNC: 262 MG/DL (ref 65–117)
GLUCOSE SERPL-MCNC: 212 MG/DL (ref 65–100)
HCT VFR BLD AUTO: 40.3 % (ref 35–47)
HGB BLD-MCNC: 12.2 G/DL (ref 11.5–16)
IMM GRANULOCYTES # BLD AUTO: 0.1 K/UL (ref 0–0.04)
IMM GRANULOCYTES NFR BLD AUTO: 1 % (ref 0–0.5)
LYMPHOCYTES # BLD: 0.5 K/UL (ref 0.8–3.5)
LYMPHOCYTES NFR BLD: 5 % (ref 12–49)
MCH RBC QN AUTO: 28.4 PG (ref 26–34)
MCHC RBC AUTO-ENTMCNC: 30.3 G/DL (ref 30–36.5)
MCV RBC AUTO: 93.7 FL (ref 80–99)
MONOCYTES # BLD: 0.5 K/UL (ref 0–1)
MONOCYTES NFR BLD: 5 % (ref 5–13)
NEUTS SEG # BLD: 9.7 K/UL (ref 1.8–8)
NEUTS SEG NFR BLD: 89 % (ref 32–75)
NRBC # BLD: 0 K/UL (ref 0–0.01)
NRBC BLD-RTO: 0 PER 100 WBC
PLATELET # BLD AUTO: 190 K/UL (ref 150–400)
PMV BLD AUTO: 10.5 FL (ref 8.9–12.9)
POTASSIUM SERPL-SCNC: 4.9 MMOL/L (ref 3.5–5.1)
RBC # BLD AUTO: 4.3 M/UL (ref 3.8–5.2)
RBC MORPH BLD: ABNORMAL
SERVICE CMNT-IMP: ABNORMAL
SODIUM SERPL-SCNC: 134 MMOL/L (ref 136–145)
WBC # BLD AUTO: 10.8 K/UL (ref 3.6–11)
WBC MORPH BLD: ABNORMAL

## 2022-04-09 PROCEDURE — 74011250636 HC RX REV CODE- 250/636: Performed by: INTERNAL MEDICINE

## 2022-04-09 PROCEDURE — 36415 COLL VENOUS BLD VENIPUNCTURE: CPT

## 2022-04-09 PROCEDURE — 74011250637 HC RX REV CODE- 250/637: Performed by: STUDENT IN AN ORGANIZED HEALTH CARE EDUCATION/TRAINING PROGRAM

## 2022-04-09 PROCEDURE — 74011000250 HC RX REV CODE- 250: Performed by: INTERNAL MEDICINE

## 2022-04-09 PROCEDURE — 94640 AIRWAY INHALATION TREATMENT: CPT

## 2022-04-09 PROCEDURE — 2709999900 HC NON-CHARGEABLE SUPPLY

## 2022-04-09 PROCEDURE — 74011636637 HC RX REV CODE- 636/637: Performed by: INTERNAL MEDICINE

## 2022-04-09 PROCEDURE — 85025 COMPLETE CBC W/AUTO DIFF WBC: CPT

## 2022-04-09 PROCEDURE — 74011250637 HC RX REV CODE- 250/637: Performed by: INTERNAL MEDICINE

## 2022-04-09 PROCEDURE — 74011250636 HC RX REV CODE- 250/636: Performed by: STUDENT IN AN ORGANIZED HEALTH CARE EDUCATION/TRAINING PROGRAM

## 2022-04-09 PROCEDURE — 82962 GLUCOSE BLOOD TEST: CPT

## 2022-04-09 PROCEDURE — 94760 N-INVAS EAR/PLS OXIMETRY 1: CPT

## 2022-04-09 PROCEDURE — 80048 BASIC METABOLIC PNL TOTAL CA: CPT

## 2022-04-09 PROCEDURE — 77010033678 HC OXYGEN DAILY

## 2022-04-09 PROCEDURE — 65270000029 HC RM PRIVATE

## 2022-04-09 RX ORDER — AMMONIUM LACTATE 12 G/100G
LOTION TOPICAL 2 TIMES DAILY
Status: DISCONTINUED | OUTPATIENT
Start: 2022-04-09 | End: 2022-04-11 | Stop reason: HOSPADM

## 2022-04-09 RX ORDER — FUROSEMIDE 10 MG/ML
40 INJECTION INTRAMUSCULAR; INTRAVENOUS ONCE
Status: COMPLETED | OUTPATIENT
Start: 2022-04-09 | End: 2022-04-09

## 2022-04-09 RX ORDER — SPIRONOLACTONE 25 MG/1
25 TABLET ORAL DAILY
Status: DISCONTINUED | OUTPATIENT
Start: 2022-04-10 | End: 2022-04-11 | Stop reason: HOSPADM

## 2022-04-09 RX ORDER — FUROSEMIDE 40 MG/1
40 TABLET ORAL 2 TIMES DAILY
Status: DISCONTINUED | OUTPATIENT
Start: 2022-04-09 | End: 2022-04-11 | Stop reason: HOSPADM

## 2022-04-09 RX ORDER — CETIRIZINE HCL 10 MG
10 TABLET ORAL DAILY
Status: DISCONTINUED | OUTPATIENT
Start: 2022-04-09 | End: 2022-04-11 | Stop reason: HOSPADM

## 2022-04-09 RX ORDER — IPRATROPIUM BROMIDE AND ALBUTEROL SULFATE 2.5; .5 MG/3ML; MG/3ML
3 SOLUTION RESPIRATORY (INHALATION)
Status: DISCONTINUED | OUTPATIENT
Start: 2022-04-09 | End: 2022-04-11 | Stop reason: HOSPADM

## 2022-04-09 RX ADMIN — MICONAZOLE NITRATE: 20 CREAM TOPICAL at 10:29

## 2022-04-09 RX ADMIN — Medication 3 UNITS: at 21:33

## 2022-04-09 RX ADMIN — METHYLPREDNISOLONE SODIUM SUCCINATE 40 MG: 40 INJECTION, POWDER, FOR SOLUTION INTRAMUSCULAR; INTRAVENOUS at 22:00

## 2022-04-09 RX ADMIN — SODIUM CHLORIDE, PRESERVATIVE FREE 10 ML: 5 INJECTION INTRAVENOUS at 18:37

## 2022-04-09 RX ADMIN — ARFORMOTEROL TARTRATE 15 MCG: 15 SOLUTION RESPIRATORY (INHALATION) at 09:17

## 2022-04-09 RX ADMIN — FLUTICASONE PROPIONATE 2 SPRAY: 50 SPRAY, METERED NASAL at 10:26

## 2022-04-09 RX ADMIN — METHYLPREDNISOLONE SODIUM SUCCINATE 40 MG: 40 INJECTION, POWDER, FOR SOLUTION INTRAMUSCULAR; INTRAVENOUS at 06:51

## 2022-04-09 RX ADMIN — ENOXAPARIN SODIUM 40 MG: 40 INJECTION SUBCUTANEOUS at 08:41

## 2022-04-09 RX ADMIN — ASPIRIN 81 MG: 81 TABLET, CHEWABLE ORAL at 08:38

## 2022-04-09 RX ADMIN — FUROSEMIDE 40 MG: 10 INJECTION, SOLUTION INTRAMUSCULAR; INTRAVENOUS at 09:58

## 2022-04-09 RX ADMIN — MICONAZOLE NITRATE: 20 CREAM TOPICAL at 22:54

## 2022-04-09 RX ADMIN — CYCLOBENZAPRINE 5 MG: 10 TABLET, FILM COATED ORAL at 08:40

## 2022-04-09 RX ADMIN — METHYLPREDNISOLONE SODIUM SUCCINATE 40 MG: 40 INJECTION, POWDER, FOR SOLUTION INTRAMUSCULAR; INTRAVENOUS at 13:10

## 2022-04-09 RX ADMIN — Medication 3 UNITS: at 08:42

## 2022-04-09 RX ADMIN — CETIRIZINE HYDROCHLORIDE 10 MG: 10 TABLET, FILM COATED ORAL at 10:06

## 2022-04-09 RX ADMIN — IPRATROPIUM BROMIDE AND ALBUTEROL SULFATE 3 ML: .5; 3 SOLUTION RESPIRATORY (INHALATION) at 09:17

## 2022-04-09 RX ADMIN — Medication: at 21:34

## 2022-04-09 RX ADMIN — IPRATROPIUM BROMIDE AND ALBUTEROL SULFATE 3 ML: .5; 3 SOLUTION RESPIRATORY (INHALATION) at 12:28

## 2022-04-09 RX ADMIN — ARFORMOTEROL TARTRATE 15 MCG: 15 SOLUTION RESPIRATORY (INHALATION) at 21:02

## 2022-04-09 RX ADMIN — SODIUM CHLORIDE, PRESERVATIVE FREE 5 ML: 5 INJECTION INTRAVENOUS at 06:51

## 2022-04-09 RX ADMIN — INSULIN GLARGINE 40 UNITS: 100 INJECTION, SOLUTION SUBCUTANEOUS at 08:43

## 2022-04-09 RX ADMIN — METOPROLOL TARTRATE 25 MG: 25 TABLET, FILM COATED ORAL at 08:38

## 2022-04-09 RX ADMIN — Medication 2 UNITS: at 12:09

## 2022-04-09 RX ADMIN — BUDESONIDE 250 MCG: 0.25 INHALANT RESPIRATORY (INHALATION) at 21:02

## 2022-04-09 RX ADMIN — PANTOPRAZOLE SODIUM 40 MG: 40 TABLET, DELAYED RELEASE ORAL at 08:41

## 2022-04-09 RX ADMIN — SODIUM CHLORIDE, PRESERVATIVE FREE 10 ML: 5 INJECTION INTRAVENOUS at 21:32

## 2022-04-09 RX ADMIN — IPRATROPIUM BROMIDE AND ALBUTEROL SULFATE 3 ML: .5; 3 SOLUTION RESPIRATORY (INHALATION) at 03:44

## 2022-04-09 RX ADMIN — Medication: at 08:37

## 2022-04-09 RX ADMIN — MORPHINE SULFATE 1 MG: 2 INJECTION, SOLUTION INTRAMUSCULAR; INTRAVENOUS at 18:36

## 2022-04-09 RX ADMIN — ENOXAPARIN SODIUM 40 MG: 40 INJECTION SUBCUTANEOUS at 21:34

## 2022-04-09 RX ADMIN — ATORVASTATIN CALCIUM 10 MG: 10 TABLET, FILM COATED ORAL at 08:38

## 2022-04-09 RX ADMIN — Medication 2 UNITS: at 17:03

## 2022-04-09 RX ADMIN — SODIUM CHLORIDE, PRESERVATIVE FREE 10 ML: 5 INJECTION INTRAVENOUS at 13:11

## 2022-04-09 RX ADMIN — BUDESONIDE 250 MCG: 0.25 INHALANT RESPIRATORY (INHALATION) at 09:18

## 2022-04-09 RX ADMIN — IPRATROPIUM BROMIDE AND ALBUTEROL SULFATE 3 ML: .5; 3 SOLUTION RESPIRATORY (INHALATION) at 21:02

## 2022-04-09 RX ADMIN — SODIUM CHLORIDE, PRESERVATIVE FREE 10 ML: 5 INJECTION INTRAVENOUS at 14:00

## 2022-04-09 RX ADMIN — AZITHROMYCIN MONOHYDRATE 500 MG: 500 INJECTION, POWDER, LYOPHILIZED, FOR SOLUTION INTRAVENOUS at 08:44

## 2022-04-09 NOTE — PROGRESS NOTES
Consulted w/attending and informed patient isn't medically stable for discharge today.          Haroon Harrison, MSW

## 2022-04-09 NOTE — PROGRESS NOTES
PT C/O BACK PAIN 4-5/10. OXYCODONE TAKEN TO AND OFFERED TO PT. PT STATES SHE WANTS IV MORPHINE. TOLD HER OF PERAMETERS ON PAIN MEDS AND THAT OXYCODONE SHE BE TRIED FIRST. PT REFUSED. WILL SEE IF ORDERS CAN BE ADJUSTED BY DR DILL.

## 2022-04-09 NOTE — PROGRESS NOTES
Hospitalist Progress Note    NAME: Dania Keys   :  1970   MRN:  [id number]       Assessment / Plan:  Acute on chronic hypoxic and hypercapnic respiratory failure, POA  COPD exacerbation, POA  History of COPD chronically on oxygen 5 L. Presented with worsening cough, shortness of breath and dyspnea on exertion. Found to be hypoxic and hypercapnic in the ED  Chest x-ray with pulmonary venous congestion without overt pulmonary edema or other acute finding  procal 0.1  Reduce solumderol to 40 mg IV Q12  DuoNeb, budesonide/arformoterol  Empiric azithromycin for COPD exacerbation  Wean O2 as tolerated. Resume diuretics with lasix. She takes 40 mg PO BID at home. Give IV lasix now and resume home dosing     Diabetes mellitus type 2  Continue Lantus  SSI     Hypertension  Continue metoprolol  Resume aldactone     Elevated creatinine, improved  Does not meet criteria for VIVIANA  Resume diuretics  Follow BMP     Morbid obesity  Counseling was provided for weight loss     Dyslipidemia  Continue home statin     Chronic diastolic heart failure  CAD  proBNP is not elevated  She looks euvolemic  Continue home meds except spironolactone      History of breast cancer with L2 sclerotic lesion on previous imaging studies  No rx yet. She states she is supposed to f/u with Dr Magnolia Rubi and is requesting to see him inpt. I spoke to Dr Nancey Opitz who is currently out of town. Will have pt f/u outpt as scheduled.     Former tobacco smoker  She reported that she quit 4 years ago         Code Status: Full code  Surrogate Decision Maker: Her sister-in-law and her son   DVT Prophylaxis: Lovenox  GI Prophylaxis: not indicated   Baseline: Active, chronically on oxygen, uses a walker     Subjective:     Chief Complaint / Reason for Physician Visit  Patient seen and examined at bedside. Patient had worsening shortness of breath this morning. Her pulse ox did drop while receiving nebulizer treatments.   Patient states that she has not been on her diuretics. Review of Systems:  Symptom Y/N Comments  Symptom Y/N Comments   Fever/Chills    Chest Pain     Poor Appetite    Edema     Cough    Abdominal Pain     Sputum    Joint Pain     SOB/RAZO    Pruritis/Rash     Nausea/vomit    Tolerating PT/OT     Diarrhea    Tolerating Diet     Constipation    Other       Could NOT obtain due to:      Objective:     VITALS:   Last 24hrs VS reviewed since prior progress note. Most recent are:  Patient Vitals for the past 24 hrs:   Temp Pulse Resp BP SpO2   04/09/22 1228 -- -- -- -- 97 %   04/09/22 0923 -- -- -- -- (!) 87 %   04/09/22 0838 -- 77 22 (!) 144/60 (!) 89 %   04/09/22 0344 -- -- -- -- 95 %   04/08/22 2147 97 °F (36.1 °C) 70 22 (!) 111/54 97 %   04/08/22 2135 -- -- -- -- 97 %   04/08/22 1606 98 °F (36.7 °C) 69 22 (!) 95/45 98 %       Intake/Output Summary (Last 24 hours) at 4/9/2022 1431  Last data filed at 4/9/2022 1308  Gross per 24 hour   Intake 250 ml   Output 800 ml   Net -550 ml        I had a face to face encounter and independently examined this patient on 4/9/2022, as outlined below:  PHYSICAL EXAM:  General: WD, WN. Alert, cooperative, no acute distress    EENT:  EOMI. Anicteric sclerae. MMM  Resp:  CTA bilaterally, no wheezing or rales. No accessory muscle use  CV:  Regular  rhythm,  No edema  GI:  Soft, Non distended, Non tender. +Bowel sounds  Neurologic:  Alert and oriented X 3, normal speech  Psych:   Good insight.  Not anxious nor agitated  Skin:  No jaundice    Reviewed most current lab test results and cultures  YES  Reviewed most current radiology test results   YES  Review and summation of old records today    NO  Reviewed patient's current orders and MAR    YES  PMH/SH reviewed - no change compared to H&P  ________________________________________________________________________  Care Plan discussed with:    Comments   Patient x    Family      RN x    Care Manager     Consultant                        Multidiciplinary team rounds were held today with , nursing, pharmacist and clinical coordinator. Patient's plan of care was discussed; medications were reviewed and discharge planning was addressed. ________________________________________________________________________  Total NON critical care TIME:  35   Minutes    Total CRITICAL CARE TIME Spent:   Minutes non procedure based      Comments   >50% of visit spent in counseling and coordination of care     ________________________________________________________________________  Gen Benedict MD     Procedures: see electronic medical records for all procedures/Xrays and details which were not copied into this note but were reviewed prior to creation of Plan. LABS:  I reviewed today's most current labs and imaging studies.   Pertinent labs include:  Recent Labs     04/09/22  0543 04/07/22  0424   WBC 10.8 14.7*   HGB 12.2 11.5   HCT 40.3 38.6    174     Recent Labs     04/09/22  0543 04/07/22  0424   * 135*   K 4.9 4.6    100   CO2 29 33*   * 248*   BUN 22* 22*   CREA 0.81 0.95   CA 10.1 10.6*   ALB  --  3.0*   TBILI  --  0.4   ALT  --  23       Signed: Gen Benedict MD

## 2022-04-09 NOTE — PROGRESS NOTES
DR Khurram Strong SENT MESSAGE ON PERFECT SERVE R/T LOW BP AND THAT I HELD LOPRESSOR AND LASIX . HE STATED OK TO HOLD. REQUESTED MORPHINE TO BE CHANGED TO PO WITH NO NEW ORDERS YET.

## 2022-04-10 LAB
ANION GAP SERPL CALC-SCNC: 4 MMOL/L (ref 5–15)
BASOPHILS # BLD: 0 K/UL (ref 0–0.1)
BASOPHILS NFR BLD: 0 % (ref 0–1)
BUN SERPL-MCNC: 25 MG/DL (ref 6–20)
BUN/CREAT SERPL: 27 (ref 12–20)
CALCIUM SERPL-MCNC: 9.9 MG/DL (ref 8.5–10.1)
CHLORIDE SERPL-SCNC: 100 MMOL/L (ref 97–108)
CO2 SERPL-SCNC: 29 MMOL/L (ref 21–32)
CREAT SERPL-MCNC: 0.92 MG/DL (ref 0.55–1.02)
DIFFERENTIAL METHOD BLD: ABNORMAL
EOSINOPHIL # BLD: 0 K/UL (ref 0–0.4)
EOSINOPHIL NFR BLD: 0 % (ref 0–7)
ERYTHROCYTE [DISTWIDTH] IN BLOOD BY AUTOMATED COUNT: 13.1 % (ref 11.5–14.5)
GLUCOSE BLD STRIP.AUTO-MCNC: 153 MG/DL (ref 65–117)
GLUCOSE BLD STRIP.AUTO-MCNC: 158 MG/DL (ref 65–117)
GLUCOSE BLD STRIP.AUTO-MCNC: 174 MG/DL (ref 65–117)
GLUCOSE BLD STRIP.AUTO-MCNC: 266 MG/DL (ref 65–117)
GLUCOSE SERPL-MCNC: 229 MG/DL (ref 65–100)
HCT VFR BLD AUTO: 39.6 % (ref 35–47)
HGB BLD-MCNC: 12.2 G/DL (ref 11.5–16)
IMM GRANULOCYTES # BLD AUTO: 0.1 K/UL (ref 0–0.04)
IMM GRANULOCYTES NFR BLD AUTO: 1 % (ref 0–0.5)
LYMPHOCYTES # BLD: 0.5 K/UL (ref 0.8–3.5)
LYMPHOCYTES NFR BLD: 4 % (ref 12–49)
MAGNESIUM SERPL-MCNC: 2.6 MG/DL (ref 1.6–2.4)
MCH RBC QN AUTO: 28.1 PG (ref 26–34)
MCHC RBC AUTO-ENTMCNC: 30.8 G/DL (ref 30–36.5)
MCV RBC AUTO: 91.2 FL (ref 80–99)
MONOCYTES # BLD: 0.5 K/UL (ref 0–1)
MONOCYTES NFR BLD: 4 % (ref 5–13)
NEUTS SEG # BLD: 12.6 K/UL (ref 1.8–8)
NEUTS SEG NFR BLD: 91 % (ref 32–75)
NRBC # BLD: 0 K/UL (ref 0–0.01)
NRBC BLD-RTO: 0 PER 100 WBC
PHOSPHATE SERPL-MCNC: 2.2 MG/DL (ref 2.6–4.7)
PLATELET # BLD AUTO: 247 K/UL (ref 150–400)
PMV BLD AUTO: 10.9 FL (ref 8.9–12.9)
POTASSIUM SERPL-SCNC: 4.3 MMOL/L (ref 3.5–5.1)
RBC # BLD AUTO: 4.34 M/UL (ref 3.8–5.2)
RBC MORPH BLD: ABNORMAL
SERVICE CMNT-IMP: ABNORMAL
SODIUM SERPL-SCNC: 133 MMOL/L (ref 136–145)
WBC # BLD AUTO: 13.7 K/UL (ref 3.6–11)

## 2022-04-10 PROCEDURE — 77010033678 HC OXYGEN DAILY

## 2022-04-10 PROCEDURE — 85025 COMPLETE CBC W/AUTO DIFF WBC: CPT

## 2022-04-10 PROCEDURE — 74011250636 HC RX REV CODE- 250/636: Performed by: INTERNAL MEDICINE

## 2022-04-10 PROCEDURE — 84100 ASSAY OF PHOSPHORUS: CPT

## 2022-04-10 PROCEDURE — 80048 BASIC METABOLIC PNL TOTAL CA: CPT

## 2022-04-10 PROCEDURE — 74011636637 HC RX REV CODE- 636/637: Performed by: INTERNAL MEDICINE

## 2022-04-10 PROCEDURE — 65270000029 HC RM PRIVATE

## 2022-04-10 PROCEDURE — 74011250637 HC RX REV CODE- 250/637: Performed by: INTERNAL MEDICINE

## 2022-04-10 PROCEDURE — 74011250636 HC RX REV CODE- 250/636: Performed by: STUDENT IN AN ORGANIZED HEALTH CARE EDUCATION/TRAINING PROGRAM

## 2022-04-10 PROCEDURE — 74011000250 HC RX REV CODE- 250: Performed by: INTERNAL MEDICINE

## 2022-04-10 PROCEDURE — 36415 COLL VENOUS BLD VENIPUNCTURE: CPT

## 2022-04-10 PROCEDURE — 94640 AIRWAY INHALATION TREATMENT: CPT

## 2022-04-10 PROCEDURE — 74011250637 HC RX REV CODE- 250/637: Performed by: STUDENT IN AN ORGANIZED HEALTH CARE EDUCATION/TRAINING PROGRAM

## 2022-04-10 PROCEDURE — 83735 ASSAY OF MAGNESIUM: CPT

## 2022-04-10 PROCEDURE — 82962 GLUCOSE BLOOD TEST: CPT

## 2022-04-10 PROCEDURE — 94760 N-INVAS EAR/PLS OXIMETRY 1: CPT

## 2022-04-10 RX ORDER — LIDOCAINE 4 G/100G
1 PATCH TOPICAL EVERY 24 HOURS
Status: DISCONTINUED | OUTPATIENT
Start: 2022-04-10 | End: 2022-04-11 | Stop reason: HOSPADM

## 2022-04-10 RX ORDER — METOPROLOL TARTRATE 25 MG/1
25 TABLET, FILM COATED ORAL 2 TIMES DAILY
Status: DISCONTINUED | OUTPATIENT
Start: 2022-04-10 | End: 2022-04-11 | Stop reason: HOSPADM

## 2022-04-10 RX ADMIN — FLUTICASONE PROPIONATE 2 SPRAY: 50 SPRAY, METERED NASAL at 08:05

## 2022-04-10 RX ADMIN — SODIUM CHLORIDE, PRESERVATIVE FREE 10 ML: 5 INJECTION INTRAVENOUS at 21:46

## 2022-04-10 RX ADMIN — CYCLOBENZAPRINE 5 MG: 10 TABLET, FILM COATED ORAL at 02:21

## 2022-04-10 RX ADMIN — FUROSEMIDE 40 MG: 40 TABLET ORAL at 08:08

## 2022-04-10 RX ADMIN — SODIUM CHLORIDE, PRESERVATIVE FREE 10 ML: 5 INJECTION INTRAVENOUS at 06:11

## 2022-04-10 RX ADMIN — Medication 2 UNITS: at 12:00

## 2022-04-10 RX ADMIN — BUDESONIDE 250 MCG: 0.25 INHALANT RESPIRATORY (INHALATION) at 20:59

## 2022-04-10 RX ADMIN — ARFORMOTEROL TARTRATE 15 MCG: 15 SOLUTION RESPIRATORY (INHALATION) at 08:53

## 2022-04-10 RX ADMIN — CETIRIZINE HYDROCHLORIDE 10 MG: 10 TABLET, FILM COATED ORAL at 08:08

## 2022-04-10 RX ADMIN — Medication 2 UNITS: at 16:34

## 2022-04-10 RX ADMIN — METOPROLOL TARTRATE 25 MG: 25 TABLET, FILM COATED ORAL at 21:46

## 2022-04-10 RX ADMIN — MICONAZOLE NITRATE: 20 CREAM TOPICAL at 21:47

## 2022-04-10 RX ADMIN — Medication 2 UNITS: at 08:02

## 2022-04-10 RX ADMIN — IPRATROPIUM BROMIDE AND ALBUTEROL SULFATE 3 ML: .5; 3 SOLUTION RESPIRATORY (INHALATION) at 02:45

## 2022-04-10 RX ADMIN — METHYLPREDNISOLONE SODIUM SUCCINATE 40 MG: 40 INJECTION, POWDER, FOR SOLUTION INTRAMUSCULAR; INTRAVENOUS at 21:44

## 2022-04-10 RX ADMIN — METOPROLOL TARTRATE 25 MG: 25 TABLET, FILM COATED ORAL at 08:08

## 2022-04-10 RX ADMIN — IPRATROPIUM BROMIDE AND ALBUTEROL SULFATE 3 ML: .5; 3 SOLUTION RESPIRATORY (INHALATION) at 08:53

## 2022-04-10 RX ADMIN — Medication 3 UNITS: at 21:45

## 2022-04-10 RX ADMIN — MICONAZOLE NITRATE: 20 CREAM TOPICAL at 08:05

## 2022-04-10 RX ADMIN — Medication: at 21:45

## 2022-04-10 RX ADMIN — ENOXAPARIN SODIUM 40 MG: 40 INJECTION SUBCUTANEOUS at 08:07

## 2022-04-10 RX ADMIN — ATORVASTATIN CALCIUM 10 MG: 10 TABLET, FILM COATED ORAL at 08:09

## 2022-04-10 RX ADMIN — SODIUM CHLORIDE, PRESERVATIVE FREE 10 ML: 5 INJECTION INTRAVENOUS at 16:34

## 2022-04-10 RX ADMIN — BUDESONIDE 250 MCG: 0.25 INHALANT RESPIRATORY (INHALATION) at 08:53

## 2022-04-10 RX ADMIN — ASPIRIN 81 MG: 81 TABLET, CHEWABLE ORAL at 08:08

## 2022-04-10 RX ADMIN — PANTOPRAZOLE SODIUM 40 MG: 40 TABLET, DELAYED RELEASE ORAL at 08:08

## 2022-04-10 RX ADMIN — ENOXAPARIN SODIUM 40 MG: 40 INJECTION SUBCUTANEOUS at 21:44

## 2022-04-10 RX ADMIN — ARFORMOTEROL TARTRATE 15 MCG: 15 SOLUTION RESPIRATORY (INHALATION) at 20:59

## 2022-04-10 RX ADMIN — INSULIN GLARGINE 40 UNITS: 100 INJECTION, SOLUTION SUBCUTANEOUS at 08:02

## 2022-04-10 RX ADMIN — FUROSEMIDE 40 MG: 40 TABLET ORAL at 17:17

## 2022-04-10 RX ADMIN — METHYLPREDNISOLONE SODIUM SUCCINATE 40 MG: 40 INJECTION, POWDER, FOR SOLUTION INTRAMUSCULAR; INTRAVENOUS at 08:08

## 2022-04-10 RX ADMIN — IPRATROPIUM BROMIDE AND ALBUTEROL SULFATE 3 ML: .5; 3 SOLUTION RESPIRATORY (INHALATION) at 20:59

## 2022-04-10 RX ADMIN — Medication: at 08:06

## 2022-04-10 RX ADMIN — SPIRONOLACTONE 25 MG: 25 TABLET ORAL at 08:08

## 2022-04-10 RX ADMIN — AZITHROMYCIN MONOHYDRATE 500 MG: 500 INJECTION, POWDER, LYOPHILIZED, FOR SOLUTION INTRAVENOUS at 12:00

## 2022-04-10 NOTE — PROGRESS NOTES
Hospitalist Progress Note    NAME: Dania Keys   :  1970   MRN:  [id number]       Assessment / Plan:  Acute on chronic hypoxic and hypercapnic respiratory failure, POA  COPD exacerbation, POA  History of COPD chronically on oxygen 5 L. Presented with worsening cough, shortness of breath and dyspnea on exertion. Found to be hypoxic and hypercapnic in the ED  Chest x-ray with pulmonary venous congestion without overt pulmonary edema or other acute finding  procal 0.1  Reduce solumderol to 40 mg IV every 12, transition to prednisone tomorrow  DuoNeb, budesonide/arformoterol  Empiric azithromycin for COPD exacerbation  Wean O2 as tolerated. Resume diuretics with lasix. She takes 40 mg PO BID at home.      Diabetes mellitus type 2  Continue Lantus  SSI     Hypertension  Continue metoprolol  Resume aldactone     Elevated creatinine, improved  Does not meet criteria for VIVIANA  Resume diuretics  Follow BMP     Morbid obesity  Counseling was provided for weight loss     Dyslipidemia  Continue home statin     Chronic diastolic heart failure  CAD  proBNP is not elevated  She looks euvolemic  Continue home meds except spironolactone      History of breast cancer with L2 sclerotic lesion on previous imaging studies  No rx yet. She states she is supposed to f/u with Dr Magnolia Rubi and is requesting to see him inpt. Will have pt f/u outpt as scheduled, as physician is on vacation     Former tobacco smoker  She reported that she quit 4 years ago         Code Status: Full code  Surrogate Decision Maker: Her sister-in-law and her son   DVT Prophylaxis: Lovenox  GI Prophylaxis: not indicated   Baseline: Active, chronically on oxygen, uses a walker     Subjective:     Chief Complaint / Reason for Physician Visit  Patient seen and examined at bedside. She is feeling better this morning. Her respiratory status is almost back to baseline. She is complaining of some worsening lower back pain.   She says that she does have metastatic disease to her back from her breast cancer. Review of Systems:  Symptom Y/N Comments  Symptom Y/N Comments   Fever/Chills    Chest Pain     Poor Appetite    Edema     Cough    Abdominal Pain     Sputum    Joint Pain     SOB/RAZO    Pruritis/Rash     Nausea/vomit    Tolerating PT/OT     Diarrhea    Tolerating Diet     Constipation    Other       Could NOT obtain due to:      Objective:     VITALS:   Last 24hrs VS reviewed since prior progress note. Most recent are:  Patient Vitals for the past 24 hrs:   Temp Pulse Resp BP SpO2   04/10/22 0909 -- -- -- -- 100 %   04/10/22 0853 -- -- -- -- 100 %   04/10/22 0801 98.2 °F (36.8 °C) 66 16 123/69 98 %   04/10/22 0247 -- -- -- -- 95 %   04/09/22 2324 97.8 °F (36.6 °C) -- 18 (!) 122/58 96 %   04/09/22 2106 -- -- -- -- 94 %   04/09/22 2026 97.7 °F (36.5 °C) 70 18 (!) 101/51 94 %   04/09/22 1709 -- 70 -- (!) 100/52 --   04/09/22 1634 98.4 °F (36.9 °C) 70 18 (!) 100/52 95 %   04/09/22 1228 -- -- -- -- 97 %       Intake/Output Summary (Last 24 hours) at 4/10/2022 1159  Last data filed at 4/10/2022 0247  Gross per 24 hour   Intake 600 ml   Output 2400 ml   Net -1800 ml        I had a face to face encounter and independently examined this patient on 4/10/2022, as outlined below:  PHYSICAL EXAM:  General: WD, WN. Alert, cooperative, no acute distress    EENT:  EOMI. Anicteric sclerae. MMM  Resp:  CTA bilaterally, no wheezing or rales. No accessory muscle use  CV:  Regular  rhythm,  No edema  GI:  Soft, Non distended, Non tender. +Bowel sounds  Neurologic:  Alert and oriented X 3, normal speech  Psych:   Good insight.  Not anxious nor agitated  Skin:  No jaundice    Reviewed most current lab test results and cultures  YES  Reviewed most current radiology test results   YES  Review and summation of old records today    NO  Reviewed patient's current orders and MAR    YES  PMH/SH reviewed - no change compared to H&P  ________________________________________________________________________  Care Plan discussed with:    Comments   Patient x    Family      RN x    Care Manager     Consultant                        Multidiciplinary team rounds were held today with , nursing, pharmacist and clinical coordinator. Patient's plan of care was discussed; medications were reviewed and discharge planning was addressed. ________________________________________________________________________  Total NON critical care TIME:  35   Minutes    Total CRITICAL CARE TIME Spent:   Minutes non procedure based      Comments   >50% of visit spent in counseling and coordination of care     ________________________________________________________________________  Jono Pizarro MD     Procedures: see electronic medical records for all procedures/Xrays and details which were not copied into this note but were reviewed prior to creation of Plan. LABS:  I reviewed today's most current labs and imaging studies.   Pertinent labs include:  Recent Labs     04/10/22  0239 04/09/22  0543   WBC 13.7* 10.8   HGB 12.2 12.2   HCT 39.6 40.3    190     Recent Labs     04/10/22  0239 04/09/22  0543   * 134*   K 4.3 4.9    102   CO2 29 29   * 212*   BUN 25* 22*   CREA 0.92 0.81   CA 9.9 10.1   MG 2.6*  --    PHOS 2.2*  --        Signed: Jono Pizarro MD

## 2022-04-10 NOTE — PROGRESS NOTES
Transition of Care Plan:     RUR:  12% \"low risk\"  Disposition: Home with follow-up appts  Follow up appointments: PCP & specialists as indicated  DME needed: TBD- pt has bsc, shower chair, scooter, rollator, hospital bed, w/c  Transportation at Discharge: [facility] BLS transport (Medicaid vs Medicare)  Keys or means to access home: Yes    Medicare Letter: Needed at d/c  Caregiver Contact: Friend/ POA- Cecile Rausch, [phone]  Discharge Caregiver contacted prior to discharge? Reviewed pt's chart. Pt not medically stable for discharge at this time. Expect discharge home within 24-48 hours. Will follow.     ROSCOE Hammonds  Care Management

## 2022-04-10 NOTE — PROGRESS NOTES
End of Shift Note    Bedside shift change report given to [address] (oncoming nurse) by Olegario Litten, RN (offgoing nurse). Report included the following information SBAR, Kardex, Intake/Output, MAR, Accordion, Recent Results and Med Rec Status    Shift worked:  7PM-7AM     Shift summary and any significant changes:      Pt received  Prn flexeril and albuterol .  No  significant changes  duration of the shift      Concerns for physician to address:       Zone phone for oncoming shift:            Olegario Litten, RN

## 2022-04-11 VITALS
HEART RATE: 72 BPM | WEIGHT: 293 LBS | OXYGEN SATURATION: 95 % | HEIGHT: 66 IN | DIASTOLIC BLOOD PRESSURE: 70 MMHG | RESPIRATION RATE: 16 BRPM | SYSTOLIC BLOOD PRESSURE: 121 MMHG | BODY MASS INDEX: 47.09 KG/M2 | TEMPERATURE: 97.8 F

## 2022-04-11 LAB
GLUCOSE BLD STRIP.AUTO-MCNC: 155 MG/DL (ref 65–117)
GLUCOSE BLD STRIP.AUTO-MCNC: 213 MG/DL (ref 65–117)
GLUCOSE BLD STRIP.AUTO-MCNC: 227 MG/DL (ref 65–117)
SERVICE CMNT-IMP: ABNORMAL

## 2022-04-11 PROCEDURE — 74011000250 HC RX REV CODE- 250: Performed by: INTERNAL MEDICINE

## 2022-04-11 PROCEDURE — 74011250636 HC RX REV CODE- 250/636: Performed by: STUDENT IN AN ORGANIZED HEALTH CARE EDUCATION/TRAINING PROGRAM

## 2022-04-11 PROCEDURE — 82962 GLUCOSE BLOOD TEST: CPT

## 2022-04-11 PROCEDURE — 74011250637 HC RX REV CODE- 250/637: Performed by: INTERNAL MEDICINE

## 2022-04-11 PROCEDURE — 77010033678 HC OXYGEN DAILY

## 2022-04-11 PROCEDURE — 94760 N-INVAS EAR/PLS OXIMETRY 1: CPT

## 2022-04-11 PROCEDURE — 74011250636 HC RX REV CODE- 250/636: Performed by: INTERNAL MEDICINE

## 2022-04-11 PROCEDURE — 74011250637 HC RX REV CODE- 250/637: Performed by: STUDENT IN AN ORGANIZED HEALTH CARE EDUCATION/TRAINING PROGRAM

## 2022-04-11 PROCEDURE — 74011636637 HC RX REV CODE- 636/637: Performed by: INTERNAL MEDICINE

## 2022-04-11 PROCEDURE — 94640 AIRWAY INHALATION TREATMENT: CPT

## 2022-04-11 RX ORDER — MORPHINE SULFATE 15 MG/1
15 TABLET ORAL
Qty: 10 TABLET | Refills: 0 | Status: SHIPPED | OUTPATIENT
Start: 2022-04-11 | End: 2022-04-14

## 2022-04-11 RX ORDER — PREDNISONE 10 MG/1
TABLET ORAL
Qty: 26 TABLET | Refills: 0 | Status: SHIPPED | OUTPATIENT
Start: 2022-04-11 | End: 2022-04-22

## 2022-04-11 RX ORDER — LIDOCAINE 4 G/100G
1 PATCH TOPICAL EVERY 24 HOURS
Qty: 30 PATCH | Refills: 0 | Status: SHIPPED | OUTPATIENT
Start: 2022-04-11

## 2022-04-11 RX ADMIN — CYCLOBENZAPRINE 5 MG: 10 TABLET, FILM COATED ORAL at 10:08

## 2022-04-11 RX ADMIN — BUDESONIDE 250 MCG: 0.25 INHALANT RESPIRATORY (INHALATION) at 08:21

## 2022-04-11 RX ADMIN — FUROSEMIDE 40 MG: 40 TABLET ORAL at 10:07

## 2022-04-11 RX ADMIN — Medication: at 08:00

## 2022-04-11 RX ADMIN — CYCLOBENZAPRINE 5 MG: 10 TABLET, FILM COATED ORAL at 01:14

## 2022-04-11 RX ADMIN — ASPIRIN 81 MG: 81 TABLET, CHEWABLE ORAL at 10:08

## 2022-04-11 RX ADMIN — INSULIN GLARGINE 40 UNITS: 100 INJECTION, SOLUTION SUBCUTANEOUS at 10:06

## 2022-04-11 RX ADMIN — Medication 3 UNITS: at 10:05

## 2022-04-11 RX ADMIN — MICONAZOLE NITRATE: 20 CREAM TOPICAL at 10:09

## 2022-04-11 RX ADMIN — ARFORMOTEROL TARTRATE 15 MCG: 15 SOLUTION RESPIRATORY (INHALATION) at 08:21

## 2022-04-11 RX ADMIN — CETIRIZINE HYDROCHLORIDE 10 MG: 10 TABLET, FILM COATED ORAL at 10:08

## 2022-04-11 RX ADMIN — METHYLPREDNISOLONE SODIUM SUCCINATE 40 MG: 40 INJECTION, POWDER, FOR SOLUTION INTRAMUSCULAR; INTRAVENOUS at 10:07

## 2022-04-11 RX ADMIN — IPRATROPIUM BROMIDE AND ALBUTEROL SULFATE 3 ML: .5; 3 SOLUTION RESPIRATORY (INHALATION) at 08:17

## 2022-04-11 RX ADMIN — PANTOPRAZOLE SODIUM 40 MG: 40 TABLET, DELAYED RELEASE ORAL at 10:08

## 2022-04-11 RX ADMIN — ATORVASTATIN CALCIUM 10 MG: 10 TABLET, FILM COATED ORAL at 10:08

## 2022-04-11 RX ADMIN — FLUTICASONE PROPIONATE 2 SPRAY: 50 SPRAY, METERED NASAL at 09:00

## 2022-04-11 RX ADMIN — ENOXAPARIN SODIUM 40 MG: 40 INJECTION SUBCUTANEOUS at 10:05

## 2022-04-11 RX ADMIN — SPIRONOLACTONE 25 MG: 25 TABLET ORAL at 10:08

## 2022-04-11 RX ADMIN — METOPROLOL TARTRATE 25 MG: 25 TABLET, FILM COATED ORAL at 10:08

## 2022-04-11 NOTE — PROGRESS NOTES
Ready from CM standpoint. Transition of Care Plan:     RUR:  12% \"low risk\"  Disposition: Home with HH (SN) & follow-up appts  Follow up appointments: PCP & specialists as indicated  DME needed: TBD- pt has bsc, shower chair, scooter, rollator, hospital bed, w/c  Transportation at [facility] transport; [facility] at 1:30 PM  Keys or means to access home: Yes   IM Medicare Letter: Reviewed  Caregiver Contact: Friend/ POA- Cecile Cervantes, [phone]  Discharge Caregiver contacted prior to discharge? CM acknowledged discharge. Update 12:35 PM  Reviewed pt's chart. Spoke with pt to review LETICIA plan for discharge re: home with follow-up appts. Pt requesting HH to assist with wound care; requested referral be sent to [facility]. Referral sent & pending. Pt reports no questions or concerns for discharge. Initial note-  Pt anticipated to discharge home today. Full discharge note to follow. Per MD request, afternoon transport secured. Care Management Interventions  PCP Verified by CM:  Yes  Mode of Transport at Discharge: [address]  [address] Time of Discharge: 1330  Transition of Care Consult (CM Consult): Discharge Planning  Discharge Durable Medical Equipment: No  Physical Therapy Consult: Yes  Occupational Therapy Consult: Yes  Speech Therapy Consult: No  Support Systems: Other Family Member(s)  Confirm Follow Up Transport: Other (see comment)  The Plan for Transition of Care is Related to the Following Treatment Goals : Home with HH (SN) & follow-up appts  The Patient and/or Patient Representative was Provided with a Choice of Provider and Agrees with the Discharge Plan?: Yes  Freedom of Choice List was Provided with Basic Dialogue that Supports the Patient's Individualized Plan of Care/Goals, Treatment Preferences and Shares the Quality Data Associated with the Providers?: Yes  Discharge Location  Patient Expects to be Discharged to[de-identified] Home with home health    ROSCOE Melendez  Care Management

## 2022-04-11 NOTE — PROGRESS NOTES
Spiritual Care Assessment/Progress Note  [facility]      NAME: Viviane Bansal      MRN: [id number]  AGE: 54 y.o.  SEX: female  Judaism Affiliation: Church   Language: English     4/11/2022     Total Time (in minutes): 46     Spiritual Assessment begun in [facility] 2 MED TELE through conversation with:         [x]Patient        [] Family    [] Friend(s)        Reason for Consult: Initial/Spiritual assessment, patient floor     Spiritual beliefs: (Please include comment if needed)     [x] Identifies with a daryn tradition:   Church      [] Supported by a daryn community:            [] Claims no spiritual orientation:           [] Seeking spiritual identity:                [] Adheres to an individual form of spirituality:           [] Not able to assess:                           Identified resources for coping:      [x] Prayer                               [] Music                  [] Guided Imagery     [x] Family/friends                 [] Pet visits     [] Devotional reading                         [] Unknown     [] Other:                                          Interventions offered during this visit: (See comments for more details)    Patient Interventions: Affirmation of emotions/emotional suffering,Affirmation of daryn,Catharsis/review of pertinent events in supportive environment,Coping skills reviewed/reinforced,Iconic (affirming the presence of God/Higher Power),Initial/Spiritual assessment, patient floor,Normalization of emotional/spiritual concerns,Prayer (assurance of),Judaism beliefs/image of God discussed           Plan of Care:     [] Support spiritual and/or cultural needs    [] Support AMD and/or advance care planning process      [] Support grieving process   [] Coordinate Rites and/or Rituals    [] Coordination with community clergy   [x] No spiritual needs identified at this time   [] Detailed Plan of Care below (See Comments)  [] Make referral to Music Therapy  [] Make referral to Pet Therapy     [] Make referral to Addiction services  [] Make referral to [facility]  [] Make referral to Spiritual Care Partner  [] No future visits requested        [x] Contact Spiritual Care for further referrals     Comments:  Reviewed chart prior to visit on [facility] Tele for spiritual assessment. No family/friends present. Provided bedside presence and supportive listening as patient shared that she is feeling better than when she arrived. She shared, too, that she expects to be discharged today. Ms Nicki Duncan engaged in life review sharing about her family; father  in  and her mother  in 2018. Her nephew stays with her. She reflected on some of the past and current stressors in her life and mentioned some tension between her and a sister. She identifies as Mu-ism, but no longer attends the Yaz[employer] she once did. She indicated she does pray. No spiritual concerns or needs were expressed at this time. Offered assurance of prayer and words of encouragement.      Moi Mcallister, JOI, [address], Staff [address]    [address] Paging Service  [phone]

## 2022-04-11 NOTE — PROGRESS NOTES
1242: Patient educated on discharge paperwork including importance of f/u appointments; medication adherence; when to notify provider; and discharge education. Pt. Educated on opioid use, side effects, and importance of taking as prescribed. Pt. Verbalized understanding of discharge education and instructions with all questions/concerns addressed. Pt. And RN signed discharged papers indicating understanding and education. 1322: [facility] at bedside to transport patient home. Pt. Discharged via stretcher with 5L NC. Discharge paperwork and personal belongings in hand.

## 2022-04-11 NOTE — DISCHARGE SUMMARY
Hospitalist Discharge Summary     Patient ID:  Viviane Bansal  [id number]  68 y.o.  1970    PCP on record: Edgardo Turner MD    Admit date: 4/5/2022  Discharge date and time: 4/11/2022      DISCHARGE DIAGNOSIS:          CONSULTATIONS:  IP CONSULT TO HOSPITALIST    Excerpted HPI from H&P of Tor Murphy MD:    The patient is 54years old woman with past medical history significant for COPD chronically on oxygen, hypertension, hyperlipidemia, coronary artery disease presented to the emergency department due to worsening dyspnea and shortness of breath. She also reported that she has been coughing for the last 2 days. She said her worsening dyspnea got worse today for which she decided to come the emergency department. Patient denies any chest pain, abdominal pain, nausea, vomiting, diarrhea, recent travel, sick contact, fevers or chills. She reports that she quit smoking 4 years ago.     ______________________________________________________________________  DISCHARGE SUMMARY/HOSPITAL COURSE:  for full details see H&P, daily progress notes, labs, consult notes. Acute on chronic hypoxic and hypercapnic respiratory failure, POA  COPD exacerbation, POA  History of COPD chronically on oxygen 5 L. Presented with worsening cough, shortness of breath and dyspnea on exertion. Found to be hypoxic and hypercapnic in the ED  Chest x-ray with pulmonary venous congestion without overt pulmonary edema or other acute finding  procal 0.1  Reduce solumderol to 40 mg IV every 12, transition to prednisone with tapaer on DC  DuoNeb, budesonide/arformoterol  Empiric azithromycin for COPD exacerbation, completed therapy  Wean O2 as tolerated. Resume diuretics with lasix.   She takes 40 mg PO BID at home.      Diabetes mellitus type 2  Continue Lantus  SSI     Hypertension  Continue metoprolol  Resume aldactone     Elevated creatinine, improved  Does not meet criteria for VIVIANA  Resume diuretics  Follow BMP     Morbid obesity  Counseling was provided for weight loss     Dyslipidemia  Continue home statin     Chronic diastolic heart failure  CAD  proBNP is not elevated  She looks euvolemic  C/w home diuretics      History of breast cancer with L2 sclerotic lesion on previous imaging studies  No rx yet. She states she is supposed to f/u with Dr Rey Hi and is requesting to see him inpt. Will have pt f/u outpt as scheduled, as physician is on vacation  Pain control with medications     Former tobacco smoker  She reported that she quit 4 years ago    Patient did improve back to her baseline and she was stable for discharge home.    _______________________________________________________________________  Patient seen and examined by me on discharge day. Pertinent Findings:  Gen:    Not in distress  General:          WD, WN. Alert, cooperative, no acute distress    EENT:              EOMI. Anicteric sclerae. MMM  Resp:               Decreased throughout, no wheezing or rales. No accessory muscle use  CV:                  Regular  rhythm,  No edema  GI:                   Soft, Non distended, Non tender.  +Bowel sounds, morbidly obee  Neurologic:       Alert and oriented X 3, normal speech  Psych:   Good insight. Not anxious nor agitated  Skin:                No jaundice  _______________________________________________________________________  DISCHARGE MEDICATIONS:   Current Discharge Medication List      START taking these medications    Details   morphine IR (MS IR) 15 mg tablet Take 1 Tablet by mouth every four (4) hours as needed for Pain for up to 3 days.  Max Daily Amount: 90 mg.  Qty: 10 Tablet, Refills: 0  Start date: 4/11/2022, End date: 4/14/2022    Associated Diagnoses: Malignant neoplasm of female breast, unspecified estrogen receptor status, unspecified laterality, unspecified site of breast (HCC)      predniSONE (DELTASONE) 10 mg tablet Take 40 mg by mouth daily (with breakfast) for 2 days, THEN 30 mg daily (with breakfast) for 3 days, THEN 20 mg daily (with breakfast) for 3 days, THEN 10 mg daily (with breakfast) for 3 days. Qty: 26 Tablet, Refills: 0  Start date: 4/11/2022, End date: 4/22/2022      lidocaine (Salonpas, lidocaine,) 4 % patch 1 Patch by TransDERmal route every twenty-four (24) hours. Place on back  Qty: 30 Patch, Refills: 0  Start date: 4/11/2022         CONTINUE these medications which have NOT CHANGED    Details   spironolactone (ALDACTONE) 25 mg tablet Take 1 Tab by mouth daily. Qty: 30 Tab, Refills: 0      cyclobenzaprine (FLEXERIL) 5 mg tablet Take 5 mg by mouth two (2) times a day. fluticasone-umeclidinium-vilanterol (Trelegy Ellipta) 100-62.5-25 mcg inhaler Take 1 Puff by inhalation daily. NovoLOG Flexpen U-100 Insulin 100 unit/mL (3 mL) inpn 5-10 Units by SubCUTAneous route as directed. Sliding scale: -300: 5 units -400 units: 10 units      omeprazole (PRILOSEC) 40 mg capsule Take 40 mg by mouth daily. nystatin (MYCOSTATIN) topical cream Apply  to affected area two (2) times a day. Qty: 15 g, Refills: 0      albuterol-ipratropium (DUO-NEB) 2.5 mg-0.5 mg/3 ml nebu 3 mL by Nebulization route four (4) times daily. Qty: 100 Nebule, Refills: 1      furosemide (LASIX) 40 mg tablet Take 40 mg by mouth two (2) times a day. fluticasone (FLONASE) 50 mcg/actuation nasal spray 2 Sprays by Both Nostrils route daily. Qty: 1 Bottle, Refills: 0      insulin glargine (LANTUS) 100 unit/mL injection 35 Units by SubCUTAneous route daily. metoprolol tartrate (LOPRESSOR) 25 mg tablet Take 25 mg by mouth two (2) times a day. loratadine (CLARITIN) 10 mg tablet Take 10 mg by mouth daily. aspirin 81 mg chewable tablet Take 81 mg by mouth daily. lovastatin (MEVACOR) 40 mg tablet Take 1 Tab by mouth nightly.   Qty: 30 Tab, Refills: 6    Associated Diagnoses: Atherosclerosis of native coronary artery without angina pectoris      glyBURIDE (DIABETA) 5 mg tablet Take 5 mg by mouth two (2) times daily (with meals). albuterol (Ventolin HFA) 90 mcg/actuation inhaler Take 2 Puffs by inhalation every six (6) hours as needed for Wheezing. Qty: 1 Inhaler, Refills: 1    Comments: Give with spacer. ammonium lactate (LAC-HYDRIN) 12 % lotion Apply 1 Applicator to affected area as needed. rub in to affected area well      nitroglycerin (NITROSTAT) 0.4 mg SL tablet 1 Tab by SubLINGual route every five (5) minutes as needed for Chest Pain (call 911 if not relieved by 3). Qty: 25 Tab, Refills: 2    Associated Diagnoses: SVT (supraventricular tachycardia) ([facility]); CHF (congestive heart failure) ([address]             My Recommended Diet, Activity, Wound Care, and follow-up labs are listed in the patient's Discharge Insturctions which I have personally completed and reviewed.   Risk of deterioration: High    Condition at Discharge:  Stable  _____________________________________________________________________    Disposition  Home with family, no needs  ____________________________________________________________________    Care Plan discussed with:   Patient, Family, RN, Care Manager, Consultant    ____________________________________________________________________    Code Status: Full Code  ____________________________________________________________________      Condition at Discharge:  Stable  _____________________________________________________________________  Follow up with:   PCP : Hector Steven MD  Follow-up Information     Follow up With Specialties Details Why Contact Info    [address] , [place] [address]  [phone]                Total time in minutes spent coordinating this discharge (includes going over instructions, follow-up, prescriptions, and preparing report for sign off to her PCP) :  35 minutes    Signed:  Chay Aguayo MD

## 2022-04-12 ENCOUNTER — PATIENT OUTREACH (OUTPATIENT)
Dept: CASE MANAGEMENT | Age: 52
End: 2022-04-12

## 2022-04-12 NOTE — PROGRESS NOTES
Care Transitions Initial Call    Call within 2 business days of discharge: Yes     Patient: Ernesto Bates Patient : 1970 MRN: [id number]    Last Discharge [address]       Complaint Diagnosis Description Type Department Provider    22 Shortness of Breath Acute on chronic respiratory failure with hypoxia and hypercapnia ([facility] [address] . .. ED to Hosp-Admission (Discharged) (ADMIT) Tierra Richey MD; Christin Bronson,... Was this an external facility discharge? No Discharge Facility: n/a    Challenges to be reviewed by the provider   Additional needs identified to be addressed with provider: no  Patient seen by VPA          Method of communication with provider : none    Discussed COVID-19 related testing which was not done at this time. Test results were not done. Patient informed of results, if available? n/a     Advance Care Planning:   Does patient have an Advance Directive:  yes; reviewed and current     Primary Decision Maker: Cecile Bustillos - Friend - [phone]    Secondary Decision Maker: Diya Soriano [facility] [phone]    Inpatient Readmission Risk score: Unplanned Readmit Risk Score: 12.1 ( )    Was this a readmission? no   Patient stated reason for the admission: increased SOB    Patients top risk factors for readmission: depression, functional physical ability, ineffective coping, lack of knowledge about disease, level of motivation and medical condition-hx breast CA has not attended follow up with surgery. tells CTN she does not have ONC   Interventions to address risk factors: Scheduled appointment with PCP-, Scheduled appointment with Specialist- and Obtained and reviewed discharge summary and/or continuity of care documents    Care Transition Nurse (CTN) contacted the patient by telephone to perform post hospital discharge assessment. Verified name and  with patient as identifiers. Provided introduction to self, and explanation of the CTN role.      CTN reviewed discharge instructions, medical action plan and red flags with patient who verbalized understanding. Were discharge instructions available to patient? yes. Reviewed appropriate site of care based on symptoms and resources available to patient including: PCP, Specialist and  [facility]. Patient given an opportunity to ask questions and does not have any further questions or concerns at this time. The patient agrees to contact the PCP office for questions related to their healthcare. Medication reconciliation was performed with patient, who verbalizes understanding of administration of home medications. Referral to Pharm D needed: no     Home Health/Outpatient orders at discharge: home health care and [address]: [facility]  Date of initial visit: 4/14/2022     Covid Risk Education    Educated patient about risk for severe COVID-19 due to risk factors according to CDC guidelines. CTN reviewed discharge instructions, medical action plan and red flag symptoms with the patient who verbalized understanding. Discussed COVID vaccination status: yes. Education provided on COVID-19 vaccination as appropriate. Discussed exposure protocols and quarantine with CDC Guidelines. Patient was given an opportunity to verbalize any questions and concerns and agrees to contact CTN or health care provider for questions related to their healthcare. Discussed follow-up appointments. If no appointment was previously scheduled, appointment scheduling offered: n/a. Is follow up appointment scheduled within 7 days of discharge? yes. [facility] follow up appointment(s):   Future Appointments   Date Time Provider Xochilt Foreman   4/18/2022  9:40 AM Xuan Stovall MD [facility]     Non-[facility] follow up appointment(s):   PCP- Vielka Estrada 4/13-HB thru [address] for follow-up call in 7-10 days based on severity of symptoms and risk factors.   Plan for next call: self management-monitor red flags and follow up appointment-attend as directed  CTN provided contact information for future needs. Goals Addressed                 This Visit's Progress     Prevent complications post hospitalization. 04/12/22   Home oxygen-O2 protocols (ie fall risk with tubing, no smoking, oxygen in use sign), pulse ox to monitor oxygen sats.  Patient will monitor/report red flags- increased SOB, wheezing, worsening cough, excess phlegm/mucus, increased fatigue, using relief inhaler/nebulizer more often/medication not helping.  Pain control/medication-patient given morphine IR 15 mg for back pain also given lidocaine 4 % patches but will need prior auth. She will discuss patches with her PCP at follow up tomorrow.  Patient will follow up with [facility]-Dr. Lori Leigh on 4/18 she has transportation. She has several missed appt going back over a year. She understands the importance of follow up with surgery. She tells CTN that she does not have an ONC. She will discuss follow up plan and possible referrals with PCP tomorrow.

## 2022-04-22 ENCOUNTER — APPOINTMENT (OUTPATIENT)
Dept: GENERAL RADIOLOGY | Age: 52
End: 2022-04-22
Attending: EMERGENCY MEDICINE
Payer: MEDICARE

## 2022-04-22 ENCOUNTER — HOSPITAL ENCOUNTER (EMERGENCY)
Age: 52
Discharge: HOME | End: 2022-04-23
Attending: EMERGENCY MEDICINE
Payer: MEDICARE

## 2022-04-22 DIAGNOSIS — R07.9 CHEST PAIN, UNSPECIFIED TYPE: Primary | ICD-10-CM

## 2022-04-22 LAB
ALBUMIN SERPL-MCNC: 3.3 G/DL (ref 3.5–5)
ALBUMIN/GLOB SERPL: 0.8 {RATIO} (ref 1.1–2.2)
ALP SERPL-CCNC: 161 U/L (ref 45–117)
ALT SERPL-CCNC: 25 U/L (ref 12–78)
ANION GAP SERPL CALC-SCNC: 3 MMOL/L (ref 5–15)
AST SERPL-CCNC: 13 U/L (ref 15–37)
BASOPHILS # BLD: 0.1 K/UL (ref 0–0.1)
BASOPHILS NFR BLD: 0 % (ref 0–1)
BILIRUB SERPL-MCNC: 0.5 MG/DL (ref 0.2–1)
BNP SERPL-MCNC: 257 PG/ML
BUN SERPL-MCNC: 31 MG/DL (ref 6–20)
BUN/CREAT SERPL: 34 (ref 12–20)
CALCIUM SERPL-MCNC: 10.5 MG/DL (ref 8.5–10.1)
CHLORIDE SERPL-SCNC: 103 MMOL/L (ref 97–108)
CO2 SERPL-SCNC: 31 MMOL/L (ref 21–32)
CREAT SERPL-MCNC: 0.92 MG/DL (ref 0.55–1.02)
DIFFERENTIAL METHOD BLD: ABNORMAL
EOSINOPHIL # BLD: 0.1 K/UL (ref 0–0.4)
EOSINOPHIL NFR BLD: 1 % (ref 0–7)
ERYTHROCYTE [DISTWIDTH] IN BLOOD BY AUTOMATED COUNT: 13.4 % (ref 11.5–14.5)
GLOBULIN SER CALC-MCNC: 4.2 G/DL (ref 2–4)
GLUCOSE SERPL-MCNC: 158 MG/DL (ref 65–100)
HCT VFR BLD AUTO: 45.3 % (ref 35–47)
HGB BLD-MCNC: 13.6 G/DL (ref 11.5–16)
IMM GRANULOCYTES # BLD AUTO: 0.1 K/UL (ref 0–0.04)
IMM GRANULOCYTES NFR BLD AUTO: 1 % (ref 0–0.5)
LYMPHOCYTES # BLD: 1 K/UL (ref 0.8–3.5)
LYMPHOCYTES NFR BLD: 7 % (ref 12–49)
MCH RBC QN AUTO: 28 PG (ref 26–34)
MCHC RBC AUTO-ENTMCNC: 30 G/DL (ref 30–36.5)
MCV RBC AUTO: 93.4 FL (ref 80–99)
MONOCYTES # BLD: 0.7 K/UL (ref 0–1)
MONOCYTES NFR BLD: 5 % (ref 5–13)
NEUTS SEG # BLD: 11.9 K/UL (ref 1.8–8)
NEUTS SEG NFR BLD: 86 % (ref 32–75)
NRBC # BLD: 0 K/UL (ref 0–0.01)
NRBC BLD-RTO: 0 PER 100 WBC
PLATELET # BLD AUTO: 249 K/UL (ref 150–400)
PMV BLD AUTO: 10 FL (ref 8.9–12.9)
POTASSIUM SERPL-SCNC: 4.7 MMOL/L (ref 3.5–5.1)
PROT SERPL-MCNC: 7.5 G/DL (ref 6.4–8.2)
RBC # BLD AUTO: 4.85 M/UL (ref 3.8–5.2)
SODIUM SERPL-SCNC: 137 MMOL/L (ref 136–145)
TROPONIN-HIGH SENSITIVITY: 14 NG/L (ref 0–51)
TROPONIN-HIGH SENSITIVITY: 16 NG/L (ref 0–51)
WBC # BLD AUTO: 13.8 K/UL (ref 3.6–11)

## 2022-04-22 PROCEDURE — 85025 COMPLETE CBC W/AUTO DIFF WBC: CPT

## 2022-04-22 PROCEDURE — 83880 ASSAY OF NATRIURETIC PEPTIDE: CPT

## 2022-04-22 PROCEDURE — 74011250637 HC RX REV CODE- 250/637: Performed by: EMERGENCY MEDICINE

## 2022-04-22 PROCEDURE — 93005 ELECTROCARDIOGRAM TRACING: CPT

## 2022-04-22 PROCEDURE — 99285 EMERGENCY DEPT VISIT HI MDM: CPT

## 2022-04-22 PROCEDURE — 36415 COLL VENOUS BLD VENIPUNCTURE: CPT

## 2022-04-22 PROCEDURE — 80053 COMPREHEN METABOLIC PANEL: CPT

## 2022-04-22 PROCEDURE — 84484 ASSAY OF TROPONIN QUANT: CPT

## 2022-04-22 PROCEDURE — 74011000250 HC RX REV CODE- 250: Performed by: EMERGENCY MEDICINE

## 2022-04-22 PROCEDURE — 71045 X-RAY EXAM CHEST 1 VIEW: CPT

## 2022-04-22 RX ORDER — LIDOCAINE HYDROCHLORIDE 20 MG/ML
10 SOLUTION OROPHARYNGEAL
Qty: 1 EACH | Refills: 0 | Status: SHIPPED | OUTPATIENT
Start: 2022-04-22

## 2022-04-22 RX ORDER — LIDOCAINE 4 G/100G
1 PATCH TOPICAL EVERY 24 HOURS
Status: DISCONTINUED | OUTPATIENT
Start: 2022-04-22 | End: 2022-04-23 | Stop reason: HOSPADM

## 2022-04-22 RX ORDER — OXYCODONE HYDROCHLORIDE 5 MG/1
5 TABLET ORAL
Status: DISCONTINUED | OUTPATIENT
Start: 2022-04-22 | End: 2022-04-23 | Stop reason: HOSPADM

## 2022-04-22 RX ADMIN — ALUMINUM HYDROXIDE AND MAGNESIUM HYDROXIDE 40 ML: 200; 200 SUSPENSION ORAL at 20:03

## 2022-04-23 VITALS
SYSTOLIC BLOOD PRESSURE: 140 MMHG | HEART RATE: 89 BPM | OXYGEN SATURATION: 91 % | WEIGHT: 293 LBS | BODY MASS INDEX: 47.09 KG/M2 | DIASTOLIC BLOOD PRESSURE: 74 MMHG | RESPIRATION RATE: 20 BRPM | HEIGHT: 66 IN | TEMPERATURE: 97.8 F

## 2022-04-23 LAB
ATRIAL RATE: 85 BPM
CALCULATED P AXIS, ECG09: 44 DEGREES
CALCULATED R AXIS, ECG10: 58 DEGREES
CALCULATED T AXIS, ECG11: 25 DEGREES
DIAGNOSIS, 93000: NORMAL
P-R INTERVAL, ECG05: 180 MS
Q-T INTERVAL, ECG07: 338 MS
QRS DURATION, ECG06: 80 MS
QTC CALCULATION (BEZET), ECG08: 402 MS
VENTRICULAR RATE, ECG03: 85 BPM

## 2022-04-23 NOTE — ED PROVIDER NOTES
EMERGENCY DEPARTMENT HISTORY AND PHYSICAL EXAM      Date: 2022  Patient Name: Harry Marrufo    History of Presenting Illness     Chief Complaint   Patient presents with    Chest Pain     Per EMS pt had sudden onset chest pain this evening, per EMS no cardiac hx, does report COPD, is on 02 at baseline. EMS reports 324 mg ASA given en route. History Provided By: Patient and EMS    HPI: Harry Marrufo, 50 y.o. female presents to the ED with cc of CP. Pt with extensive PMHx. Pt brought to the ED by EMS. They had been called for CP. Pt c/o CP, 5/10 dull ache center of chest and epigastric region. No alleviating or exacerbating factors. Pain does not radiate. She denies any worsening of SOA  She denies any cough. There has been no abd pain, n/v/d. She denies urinary symptoms. There has been no worsening leg edema or pain. There are no other complaints, changes, or physical findings at this time. PCP: Eder Gomez MD    No current facility-administered medications on file prior to encounter. Current Outpatient Medications on File Prior to Encounter   Medication Sig Dispense Refill    [] predniSONE (DELTASONE) 10 mg tablet Take 40 mg by mouth daily (with breakfast) for 2 days, THEN 30 mg daily (with breakfast) for 3 days, THEN 20 mg daily (with breakfast) for 3 days, THEN 10 mg daily (with breakfast) for 3 days. 26 Tablet 0    lidocaine (Salonpas, lidocaine,) 4 % patch 1 Patch by TransDERmal route every twenty-four (24) hours. Place on back 30 Patch 0    albuterol (Ventolin HFA) 90 mcg/actuation inhaler Take 2 Puffs by inhalation every six (6) hours as needed for Wheezing. 1 Inhaler 1    spironolactone (ALDACTONE) 25 mg tablet Take 1 Tab by mouth daily. 30 Tab 0    cyclobenzaprine (FLEXERIL) 5 mg tablet Take 5 mg by mouth two (2) times a day. As needed      fluticasone-umeclidinium-vilanterol (Trelegy Ellipta) 100-62.5-25 mcg inhaler Take 1 Puff by inhalation daily.       NovoLOG Flexpen U-100 Insulin 100 unit/mL (3 mL) inpn 5-10 Units by SubCUTAneous route as directed. Sliding scale: -300: 5 units -400 units: 10 units      ammonium lactate (LAC-HYDRIN) 12 % lotion Apply 1 Applicator to affected area as needed. rub in to affected area well      omeprazole (PRILOSEC) 40 mg capsule Take 40 mg by mouth daily.  nystatin (MYCOSTATIN) topical cream Apply  to affected area two (2) times a day. 15 g 0    albuterol-ipratropium (DUO-NEB) 2.5 mg-0.5 mg/3 ml nebu 3 mL by Nebulization route four (4) times daily. 100 Nebule 1    furosemide (LASIX) 40 mg tablet Take 40 mg by mouth two (2) times a day.  fluticasone (FLONASE) 50 mcg/actuation nasal spray 2 Sprays by Both Nostrils route daily. 1 Bottle 0    insulin glargine (LANTUS) 100 unit/mL injection 40 Units by SubCUTAneous route daily.  metoprolol tartrate (LOPRESSOR) 25 mg tablet Take 25 mg by mouth two (2) times a day.  loratadine (CLARITIN) 10 mg tablet Take 10 mg by mouth daily.  aspirin 81 mg chewable tablet Take 81 mg by mouth daily.  lovastatin (MEVACOR) 40 mg tablet Take 1 Tab by mouth nightly. 30 Tab 6    glyBURIDE (DIABETA) 5 mg tablet Take 5 mg by mouth two (2) times daily (with meals).  nitroglycerin (NITROSTAT) 0.4 mg SL tablet 1 Tab by SubLINGual route every five (5) minutes as needed for Chest Pain (call 911 if not relieved by 3).  (Patient not taking: Reported on 4/6/2022) 25 Tab 2       Past History     Past Medical History:  Past Medical History:   Diagnosis Date    Arthritis     Asthma     Breast lump     CAD (coronary artery disease)     [facility]     breast cancer    Congestive heart failure (HCC)     COPD (chronic obstructive pulmonary disease) (HCC)     Diabetes [facility]     Hypertension     Morbid obesity with BMI of 70 and over, adult ([facility]     NSTEMI (non-ST elevated myocardial infarction) [facility]     NSTEMI (non-ST elevated myocardial infarction) ([facility] 2012    SVT (supraventricular tachycardia) (HCC)        Past Surgical History:  Past Surgical History:   Procedure Laterality Date    HX  SECTION      HX ORTHOPAEDIC      HX OTHER SURGICAL      cyst removed from back    [place] CARDIAC SURG PROCEDURE UNLIST      Stents       Family History:  Family History   Problem Relation Age of Onset    Heart Disease Mother     Heart Disease Father     Heart Disease Sister     Breast Cancer Maternal Grandmother     Breast Cancer Paternal Grandmother        Social History:  Social History     Tobacco Use    Smoking status: Former Smoker     Packs/day: 0.25     Types: Cigarettes    Smokeless tobacco: Never Used    Tobacco comment: Patient states \"I  aint smoking no more d*mn cigarettes after yesterday\" 2018   Substance Use Topics    Alcohol use: No    Drug use: No       Allergies:  No Known Allergies      Review of Systems   Review of Systems   Constitutional: Negative. Negative for appetite change, chills, fatigue and fever. HENT: Negative. Negative for congestion, rhinorrhea, sinus pressure and sore throat. Eyes: Negative. Respiratory: Negative. Negative for cough, choking, chest tightness, shortness of breath and wheezing. Cardiovascular: Positive for chest pain. Negative for palpitations and leg swelling. Gastrointestinal: Negative for abdominal pain, constipation, diarrhea, nausea and vomiting. Endocrine: Negative. Genitourinary: Negative. Negative for difficulty urinating, dysuria, flank pain and urgency. Musculoskeletal: Negative. Skin: Negative. Neurological: Negative. Negative for dizziness, speech difficulty, weakness, light-headedness, numbness and headaches. Psychiatric/Behavioral: Negative. All other systems reviewed and are negative. Physical Exam   Physical Exam  Vitals and nursing note reviewed. Constitutional:       General: She is not in acute distress. Appearance: She is well-developed. She is not diaphoretic. Comments: Morbidly obese    HENT:      Head: Normocephalic and atraumatic. Mouth/Throat:      Pharynx: No oropharyngeal exudate. Eyes:      Conjunctiva/sclera: Conjunctivae normal.      Pupils: Pupils are equal, round, and reactive to light. Neck:      Vascular: No JVD. Trachea: No tracheal deviation. Cardiovascular:      Rate and Rhythm: Normal rate and regular rhythm. Heart sounds: Normal heart sounds. No murmur heard. Pulmonary:      Effort: Pulmonary effort is normal. No respiratory distress. Breath sounds: No stridor. Decreased breath sounds (bases, limited by body habitus) present. No wheezing or rales. Abdominal:      General: There is no distension. Palpations: Abdomen is soft. Tenderness: There is no abdominal tenderness. There is no guarding or rebound. Musculoskeletal:         General: No tenderness. Normal range of motion. Cervical back: Normal range of motion and neck supple. Right lower leg: Edema present. Left lower leg: Edema present. Skin:     General: Skin is warm and dry. Neurological:      Mental Status: She is alert and oriented to person, place, and time. Cranial Nerves: No cranial nerve deficit.       Comments: No gross motor or sensory deficits    Psychiatric:         Behavior: Behavior normal.         Diagnostic Study Results     Labs -     Recent Results (from the past 12 hour(s))   EKG, 12 LEAD, INITIAL    Collection Time: 04/22/22  6:39 PM   Result Value Ref Range    Ventricular Rate 85 BPM    Atrial Rate 85 BPM    P-R Interval 180 ms    QRS Duration 80 ms    Q-T Interval 338 ms    QTC Calculation (Bezet) 402 ms    Calculated P Axis 44 degrees    Calculated R Axis 58 degrees    Calculated T Axis 25 degrees    Diagnosis       Normal sinus rhythm  Cannot rule out Anterior infarct , age undetermined  When compared with ECG of 05-APR-2022 19:27,  No significant change was found     CBC WITH AUTOMATED DIFF    Collection Time: 04/22/22  6:41 PM   Result Value Ref Range    WBC 13.8 (H) 3.6 - 11.0 K/uL    RBC 4.85 3.80 - 5.20 M/uL    HGB 13.6 11.5 - 16.0 g/dL    HCT 45.3 35.0 - 47.0 %    MCV 93.4 80.0 - 99.0 FL    MCH 28.0 26.0 - 34.0 PG    MCHC 30.0 30.0 - 36.5 g/dL    RDW 13.4 11.5 - 14.5 %    PLATELET 588 768 - 941 K/uL    MPV 10.0 8.9 - 12.9 FL    NRBC 0.0 0  WBC    ABSOLUTE NRBC 0.00 0.00 - 0.01 K/uL    NEUTROPHILS 86 (H) 32 - 75 %    LYMPHOCYTES 7 (L) 12 - 49 %    MONOCYTES 5 5 - 13 %    EOSINOPHILS 1 0 - 7 %    BASOPHILS 0 0 - 1 %    IMMATURE GRANULOCYTES 1 (H) 0.0 - 0.5 %    ABS. NEUTROPHILS 11.9 (H) 1.8 - 8.0 K/UL    ABS. LYMPHOCYTES 1.0 0.8 - 3.5 K/UL    ABS. MONOCYTES 0.7 0.0 - 1.0 K/UL    ABS. EOSINOPHILS 0.1 0.0 - 0.4 K/UL    ABS. BASOPHILS 0.1 0.0 - 0.1 K/UL    ABS. IMM. GRANS. 0.1 (H) 0.00 - 0.04 K/UL    DF AUTOMATED     METABOLIC PANEL, COMPREHENSIVE    Collection Time: 04/22/22  6:41 PM   Result Value Ref Range    Sodium 137 136 - 145 mmol/L    Potassium 4.7 3.5 - 5.1 mmol/L    Chloride 103 97 - 108 mmol/L    CO2 31 21 - 32 mmol/L    Anion gap 3 (L) 5 - 15 mmol/L    Glucose 158 (H) 65 - 100 mg/dL    BUN 31 (H) 6 - 20 MG/DL    Creatinine 0.92 0.55 - 1.02 MG/DL    BUN/Creatinine ratio 34 (H) 12 - 20      GFR est AA >60 >60 ml/min/1.73m2    GFR est non-AA >60 >60 ml/min/1.73m2    Calcium 10.5 (H) 8.5 - 10.1 MG/DL    Bilirubin, total 0.5 0.2 - 1.0 MG/DL    ALT (SGPT) 25 12 - 78 U/L    AST (SGOT) 13 (L) 15 - 37 U/L    Alk.  phosphatase 161 (H) 45 - 117 U/L    Protein, total 7.5 6.4 - 8.2 g/dL    Albumin 3.3 (L) 3.5 - 5.0 g/dL    Globulin 4.2 (H) 2.0 - 4.0 g/dL    A-G Ratio 0.8 (L) 1.1 - 2.2     NT-PRO BNP    Collection Time: 04/22/22  6:41 PM   Result Value Ref Range    NT pro- (H) <125 PG/ML   TROPONIN-HIGH SENSITIVITY    Collection Time: 04/22/22  6:41 PM   Result Value Ref Range    Troponin-High Sensitivity 14 0 - 51 ng/L   TROPONIN-HIGH SENSITIVITY    Collection Time: 04/22/22 9:45 PM   Result Value Ref Range    Troponin-High Sensitivity 16 0 - 51 ng/L       Radiologic Studies -   XR CHEST PORT   Final Result   Underpenetrated. Bibasilar atelectasis; otherwise grossly clear lungs. CT Results  (Last 48 hours)    None        CXR Results  (Last 48 hours)               04/22/22 1828  XR CHEST PORT Final result    Impression:  Underpenetrated. Bibasilar atelectasis; otherwise grossly clear lungs. Narrative:  PORTABLE CHEST RADIOGRAPH/S: 4/22/2022 6:27 PM       INDICATION: Chest pain. COMPARISON: 4/5/2022. TECHNIQUE: Portable frontal radiograph/s of the chest.       FINDINGS:    The radiographs are underpenetrated secondary to patient body habitus and   portable technique. There is bibasilar atelectasis. The lungs are otherwise   grossly clear. No large pneumothorax or pleural effusion. Medical Decision Making   I am the first provider for this patient. I reviewed the vital signs, available nursing notes, past medical history, past surgical history, family history and social history. Vital Signs-Reviewed the patient's vital signs. Patient Vitals for the past 12 hrs:   Temp Pulse Resp BP SpO2   04/23/22 0050  89 20 (!) 140/74 91 %   04/22/22 1930  82   94 %   04/22/22 1820 97.8 °F (36.6 °C) 88 22 118/76 95 %       EKG interpretation: (Preliminary)  Sinus, rate 85, normal axis, ant q waves, seen on prior. Records Reviewed: Nursing Notes, Old Medical Records, Previous electrocardiograms, Ambulance Run Sheet, Previous Radiology Studies and Previous Laboratory Studies Pt with 2 admissions this yr for Acute resp failure, pt on 5l by [facility]    Provider Notes (Medical Decision Making):   DDx- Dyspepsia, PNA, chest wall pain, NSTEMI       ED Course:   Initial assessment performed. The patients presenting problems have been discussed, and they are in agreement with the care plan formulated and outlined with them.   I have encouraged them to ask questions as they arise throughout their visit. Pt did have some improvement with GI cocktail. Xray atelectasis in bases no infiltrate or pulm edema, Labs reassuring, Trop neg x 2    Disposition:  DC home, will write Rx for lidocaine to be used with Maalox or Mylanta    DISCHARGE PLAN:  1. Discharge Medication List as of 4/22/2022 11:26 PM      START taking these medications    Details   lidocaine (XYLOCAINE) 2 % solution Take 10 mL by mouth every four (4) hours as needed for Pain., Normal, Disp-1 Each, R-0         CONTINUE these medications which have NOT CHANGED    Details   predniSONE (DELTASONE) 10 mg tablet Take 40 mg by mouth daily (with breakfast) for 2 days, THEN 30 mg daily (with breakfast) for 3 days, THEN 20 mg daily (with breakfast) for 3 days, THEN 10 mg daily (with breakfast) for 3 days. , Normal, Disp-26 Tablet, R-0      lidocaine (Salonpas, lidocaine,) 4 % patch 1 Patch by TransDERmal route every twenty-four (24) hours. Place on back, Normal, Disp-30 Patch, R-0      albuterol (Ventolin HFA) 90 mcg/actuation inhaler Take 2 Puffs by inhalation every six (6) hours as needed for Wheezing., Normal, Disp-1 Inhaler, R-1Give with spacer. spironolactone (ALDACTONE) 25 mg tablet Take 1 Tab by mouth daily. , Normal, Disp-30 Tab,R-0      cyclobenzaprine (FLEXERIL) 5 mg tablet Take 5 mg by mouth two (2) times a day. As needed, Historical Med      fluticasone-umeclidinium-vilanterol (Trelegy Ellipta) 100-62.5-25 mcg inhaler Take 1 Puff by inhalation daily. , Historical Med      NovoLOG Flexpen U-100 Insulin 100 unit/mL (3 mL) inpn 5-10 Units by SubCUTAneous route as directed. Sliding scale: -300: 5 units -400 units: 10 units, Historical Med, MARCO      ammonium lactate (LAC-HYDRIN) 12 % lotion Apply 1 Applicator to affected area as needed. rub in to affected area well, Historical Med      omeprazole (PRILOSEC) 40 mg capsule Take 40 mg by mouth daily. , Historical Med      nystatin (MYCOSTATIN) topical cream Apply  to affected area two (2) times a day., Normal, Disp-15 g, R-0      albuterol-ipratropium (DUO-NEB) 2.5 mg-0.5 mg/3 ml nebu 3 mL by Nebulization route four (4) times daily. , Normal, Disp-100 Nebule, R-1      furosemide (LASIX) 40 mg tablet Take 40 mg by mouth two (2) times a day., Historical Med      fluticasone (FLONASE) 50 mcg/actuation nasal spray 2 Sprays by Both Nostrils route daily. , Print, Disp-1 Bottle, R-0      insulin glargine (LANTUS) 100 unit/mL injection 40 Units by SubCUTAneous route daily. , Historical Med      metoprolol tartrate (LOPRESSOR) 25 mg tablet Take 25 mg by mouth two (2) times a day., Historical Med      loratadine (CLARITIN) 10 mg tablet Take 10 mg by mouth daily. , Historical Med      aspirin 81 mg chewable tablet Take 81 mg by mouth daily. , Historical Med      lovastatin (MEVACOR) 40 mg tablet Take 1 Tab by mouth nightly., Normal, Disp-30 Tab, R-6      glyBURIDE (DIABETA) 5 mg tablet Take 5 mg by mouth two (2) times daily (with meals). , Historical Med      nitroglycerin (NITROSTAT) 0.4 mg SL tablet 1 Tab by SubLINGual route every five (5) minutes as needed for Chest Pain (call 911 if not relieved by 3). , Normal, Disp-25 Tab, R-2           2. Follow-up Information    None       3. Return to ED if worse     Diagnosis     Clinical Impression:   1. Chest pain, unspecified type        Attestations:    Shannan Marc, DO    Please note that this dictation was completed with [employer], the [employer] voice recognition software. Quite often unanticipated grammatical, syntax, homophones, and other interpretive errors are inadvertently transcribed by the computer software. Please disregard these errors. Please excuse any errors that have escaped final proofreading. Thank you.

## 2022-04-23 NOTE — ED NOTES
Bedside shift change report given to Marty Wolff (oncoming nurse) by Sarah Hernandez RN (offgoing nurse). Report included the following information SBAR, ED Summary, Intake/Output and MAR.

## 2022-04-26 ENCOUNTER — PATIENT OUTREACH (OUTPATIENT)
Dept: CASE MANAGEMENT | Age: 52
End: 2022-04-26

## 2022-04-26 NOTE — PROGRESS NOTES
Care Transitions Follow Up Call    Care Transition Nurse (CTN) contacted the patient by telephone to follow up after admission on 2022. Verified name and  with patient as identifiers. Addressed changes since last contact: provider home visit-   ref to [facility] [address], will see tomorrow  Follow up appointment completed? yes. Was follow up appointment scheduled within 7 days of discharge? yes. CTN reviewed discharge instructions, medical action plan and red flags with patient and discussed any barriers to care and/or understanding of plan of care after discharge. Discussed appropriate site of care based on symptoms and resources available to patient including: PCP and Specialist. The patient agrees to contact the PCP office for questions related to their healthcare. Patients top risk factors for readmission: lack of knowledge about disease and level of motivation   Interventions to address risk factors: patient will contact transportation company if not contacted later today or first thing in am for confirmation. [facility] follow up appointment(s): No future appointments. Non-[facility] follow up appointment(s):   Dr. Dena Carlton. [email or web address]  Oncologist in [place], [address] [place] [address] [address], [place], [address] [place] [address]  Phone: [phone]    CTN provided contact information for future needs. Plan for follow-up call in 5-7 days based on severity of symptoms and risk factors. Plan for next call: follow up appointment-attend ONC     Goals Addressed                 This Visit's Progress     Prevent complications post hospitalization. 22   Home oxygen-O2 protocols (ie fall risk with tubing, no smoking, oxygen in use sign), pulse ox to monitor oxygen sats.  Patient will monitor/report red flags- increased SOB, wheezing, worsening cough, excess phlegm/mucus, increased fatigue, using relief inhaler/nebulizer more often/medication not helping.    Pain control/medication-patient given morphine IR 15 mg for back pain also given lidocaine 4 % patches but will need prior auth. She will discuss patches with her PCP at follow up tomorrow.  Patient will follow up with -Dr. Nakul Roberts on 4/18 she has transportation. She has several missed appt going back over a year. She understands the importance of follow up with surgery. She tells CTN that she does not have an ONC. She will discuss follow up plan and possible referrals with PCP tomorrow. 04/26/22  Seen by PCP Visiting physicians on 4/13. Ref to Alberta Nichols- Dr. Dana Cartwright will see him tomorrow. Has transportation, will have family go with to assist with oxygen, etc.  Patient reports s/w Dr. Whitley Sanchez office and will see ONC and them follow up with them as needed. ED visit at [facility] on 4/22 for CP but likely GI related. Had one more episode since ED visit but last only a few minutes, otherwise doing well.

## 2022-05-03 ENCOUNTER — PATIENT OUTREACH (OUTPATIENT)
Dept: CASE MANAGEMENT | Age: 52
End: 2022-05-03

## 2022-05-10 ENCOUNTER — PATIENT OUTREACH (OUTPATIENT)
Dept: CASE MANAGEMENT | Age: 52
End: 2022-05-10

## 2022-05-10 NOTE — PROGRESS NOTES
Patient has graduated from the Transitions of Care Coordination  program on 5/10/2022. Patient/family has the ability to self-manage at this time Care management goals have been completed. Patient was not referred to the [facility] team for further management. Goals Addressed                 This Visit's Progress     COMPLETED: Prevent complications post hospitalization. 04/12/22   Home oxygen-O2 protocols (ie fall risk with tubing, no smoking, oxygen in use sign), pulse ox to monitor oxygen sats.  Patient will monitor/report red flags- increased SOB, wheezing, worsening cough, excess phlegm/mucus, increased fatigue, using relief inhaler/nebulizer more often/medication not helping.  Pain control/medication-patient given morphine IR 15 mg for back pain also given lidocaine 4 % patches but will need prior auth. She will discuss patches with her PCP at follow up tomorrow.  Patient will follow up with -Dr. Mitch Pereyra on 4/18 she has transportation. She has several missed appt going back over a year. She understands the importance of follow up with surgery. She tells CTN that she does not have an ONC. She will discuss follow up plan and possible referrals with PCP tomorrow. 04/26/22  Seen by PCP Visiting physicians on 4/13. Ref to Adriano Castellanos- Dr. Yaya Briceño will see him tomorrow. Has transportation, will have family go with to assist with oxygen, etc.  Patient reports s/w Dr. Leopoldo Expose office and will see ONC and then follow up with them as needed. [place] visit [place] [address] [facility] on 4/22 for CP but likely GI related. Had one more episode since ED visit but last only a few minutes, otherwise doing well.    05/10/22  Patient did not attend ONC appt scheduled on 4/27. Rescheduled for 5/27. She tells CTN that her nephew who helps her was sick and could not go. She will make sure she has a backup plan in place for next appointment and has already spoken to her son to have him available.             Patient has Care Transition Nurse's contact information for any further questions, concerns, or needs. Patients upcoming visits:  No future appointments.

## 2022-05-19 ENCOUNTER — TELEPHONE (OUTPATIENT)
Dept: SURGERY | Age: 52
End: 2022-05-19

## 2022-10-05 NOTE — PROGRESS NOTES
9:03AM  CM left VM at PCP's office requesting return call to discuss pt's hospital bed supplier as pt has some questions/concerns. CM will continue to follow and assist with d/c planning.      ROSCOE Linda  Care Manager   98.1

## 2023-02-20 NOTE — ED NOTES
MD has reviewed discharge instructions with the patient. The patient verbalized understanding. 2 = difficulty swallowing foods

## 2023-05-31 ENCOUNTER — APPOINTMENT (OUTPATIENT)
Facility: HOSPITAL | Age: 53
End: 2023-05-31
Payer: MEDICARE

## 2023-05-31 ENCOUNTER — HOSPITAL ENCOUNTER (EMERGENCY)
Facility: HOSPITAL | Age: 53
Discharge: HOME | End: 2023-05-31
Attending: EMERGENCY MEDICINE
Payer: MEDICARE

## 2023-05-31 VITALS
OXYGEN SATURATION: 99 % | BODY MASS INDEX: 57.43 KG/M2 | SYSTOLIC BLOOD PRESSURE: 134 MMHG | RESPIRATION RATE: 20 BRPM | WEIGHT: 293 LBS | HEART RATE: 110 BPM | TEMPERATURE: 98.9 F | DIASTOLIC BLOOD PRESSURE: 94 MMHG

## 2023-05-31 DIAGNOSIS — J18.9 ACUTE PNEUMONIA: ICD-10-CM

## 2023-05-31 DIAGNOSIS — R07.9 ACUTE CHEST PAIN: Primary | ICD-10-CM

## 2023-05-31 DIAGNOSIS — E66.01 MORBID OBESITY (HCC): ICD-10-CM

## 2023-05-31 LAB
ALBUMIN SERPL-MCNC: 3.3 G/DL (ref 3.5–5)
ALBUMIN/GLOB SERPL: 0.8 (ref 1.1–2.2)
ALP SERPL-CCNC: 266 U/L (ref 45–117)
ALT SERPL-CCNC: 33 U/L (ref 12–78)
ANION GAP SERPL CALC-SCNC: 4 MMOL/L (ref 5–15)
AST SERPL-CCNC: 15 U/L (ref 15–37)
BASOPHILS # BLD: 0.1 K/UL (ref 0–0.1)
BASOPHILS NFR BLD: 1 % (ref 0–1)
BILIRUB SERPL-MCNC: 0.4 MG/DL (ref 0.2–1)
BUN SERPL-MCNC: 22 MG/DL (ref 6–20)
BUN/CREAT SERPL: 26 (ref 12–20)
CALCIUM SERPL-MCNC: 11.3 MG/DL (ref 8.5–10.1)
CHLORIDE SERPL-SCNC: 101 MMOL/L (ref 97–108)
CO2 SERPL-SCNC: 33 MMOL/L (ref 21–32)
CREAT SERPL-MCNC: 0.86 MG/DL (ref 0.55–1.02)
D DIMER PPP FEU-MCNC: 0.58 MG/L FEU (ref 0–0.65)
DIFFERENTIAL METHOD BLD: ABNORMAL
EOSINOPHIL # BLD: 0 K/UL (ref 0–0.4)
EOSINOPHIL NFR BLD: 0 % (ref 0–7)
ERYTHROCYTE [DISTWIDTH] IN BLOOD BY AUTOMATED COUNT: 12.7 % (ref 11.5–14.5)
GLOBULIN SER CALC-MCNC: 4.4 G/DL (ref 2–4)
GLUCOSE SERPL-MCNC: 137 MG/DL (ref 65–100)
HCT VFR BLD AUTO: 47.2 % (ref 35–47)
HGB BLD-MCNC: 14.4 G/DL (ref 11.5–16)
IMM GRANULOCYTES # BLD AUTO: 0.3 K/UL (ref 0–0.04)
IMM GRANULOCYTES NFR BLD AUTO: 2 % (ref 0–0.5)
LYMPHOCYTES # BLD: 1.1 K/UL (ref 0.8–3.5)
LYMPHOCYTES NFR BLD: 9 % (ref 12–49)
MAGNESIUM SERPL-MCNC: 2 MG/DL (ref 1.6–2.4)
MCH RBC QN AUTO: 29.9 PG (ref 26–34)
MCHC RBC AUTO-ENTMCNC: 30.5 G/DL (ref 30–36.5)
MCV RBC AUTO: 97.9 FL (ref 80–99)
MONOCYTES # BLD: 1.2 K/UL (ref 0–1)
MONOCYTES NFR BLD: 10 % (ref 5–13)
NEUTS SEG # BLD: 9.8 K/UL (ref 1.8–8)
NEUTS SEG NFR BLD: 78 % (ref 32–75)
NRBC # BLD: 0 K/UL (ref 0–0.01)
NRBC BLD-RTO: 0 PER 100 WBC
NT PRO BNP: 258 PG/ML
PLATELET # BLD AUTO: 221 K/UL (ref 150–400)
PMV BLD AUTO: 10 FL (ref 8.9–12.9)
POTASSIUM SERPL-SCNC: 4.2 MMOL/L (ref 3.5–5.1)
PROT SERPL-MCNC: 7.7 G/DL (ref 6.4–8.2)
RBC # BLD AUTO: 4.82 M/UL (ref 3.8–5.2)
SODIUM SERPL-SCNC: 138 MMOL/L (ref 136–145)
TROPONIN I SERPL HS-MCNC: 20 NG/L (ref 0–51)
TROPONIN I SERPL HS-MCNC: 23 NG/L (ref 0–51)
WBC # BLD AUTO: 12.3 K/UL (ref 3.6–11)

## 2023-05-31 PROCEDURE — 85379 FIBRIN DEGRADATION QUANT: CPT

## 2023-05-31 PROCEDURE — 99285 EMERGENCY DEPT VISIT HI MDM: CPT

## 2023-05-31 PROCEDURE — 96374 THER/PROPH/DIAG INJ IV PUSH: CPT

## 2023-05-31 PROCEDURE — 6370000000 HC RX 637 (ALT 250 FOR IP): Performed by: EMERGENCY MEDICINE

## 2023-05-31 PROCEDURE — 85025 COMPLETE CBC W/AUTO DIFF WBC: CPT

## 2023-05-31 PROCEDURE — 83735 ASSAY OF MAGNESIUM: CPT

## 2023-05-31 PROCEDURE — 6360000002 HC RX W HCPCS: Performed by: EMERGENCY MEDICINE

## 2023-05-31 PROCEDURE — 36415 COLL VENOUS BLD VENIPUNCTURE: CPT

## 2023-05-31 PROCEDURE — 84484 ASSAY OF TROPONIN QUANT: CPT

## 2023-05-31 PROCEDURE — 93005 ELECTROCARDIOGRAM TRACING: CPT | Performed by: EMERGENCY MEDICINE

## 2023-05-31 PROCEDURE — 71046 X-RAY EXAM CHEST 2 VIEWS: CPT

## 2023-05-31 PROCEDURE — 71045 X-RAY EXAM CHEST 1 VIEW: CPT

## 2023-05-31 PROCEDURE — 80053 COMPREHEN METABOLIC PANEL: CPT

## 2023-05-31 PROCEDURE — 83880 ASSAY OF NATRIURETIC PEPTIDE: CPT

## 2023-05-31 RX ORDER — CEFDINIR 300 MG/1
300 CAPSULE ORAL 2 TIMES DAILY
Qty: 20 CAPSULE | Refills: 0 | Status: SHIPPED | OUTPATIENT
Start: 2023-05-31 | End: 2023-06-10

## 2023-05-31 RX ORDER — MORPHINE SULFATE 2 MG/ML
4 INJECTION, SOLUTION INTRAMUSCULAR; INTRAVENOUS
Status: COMPLETED | OUTPATIENT
Start: 2023-05-31 | End: 2023-05-31

## 2023-05-31 RX ORDER — ALBUTEROL SULFATE 90 UG/1
2 AEROSOL, METERED RESPIRATORY (INHALATION) EVERY 6 HOURS PRN
Qty: 18 G | Refills: 0 | Status: SHIPPED | OUTPATIENT
Start: 2023-05-31

## 2023-05-31 RX ORDER — AZITHROMYCIN 250 MG/1
TABLET, FILM COATED ORAL
Qty: 1 PACKET | Refills: 0 | Status: SHIPPED | OUTPATIENT
Start: 2023-05-31 | End: 2023-06-04

## 2023-05-31 RX ORDER — ALBUTEROL SULFATE 2.5 MG/3ML
2.5 SOLUTION RESPIRATORY (INHALATION) EVERY 6 HOURS PRN
Qty: 120 EACH | Refills: 0 | Status: SHIPPED | OUTPATIENT
Start: 2023-05-31

## 2023-05-31 RX ORDER — PREDNISONE 10 MG/1
TABLET ORAL
Qty: 1 EACH | Refills: 0 | Status: SHIPPED | OUTPATIENT
Start: 2023-05-31

## 2023-05-31 RX ORDER — ACETAMINOPHEN 325 MG/1
650 TABLET ORAL
Status: COMPLETED | OUTPATIENT
Start: 2023-05-31 | End: 2023-05-31

## 2023-05-31 RX ADMIN — ACETAMINOPHEN 650 MG: 325 TABLET ORAL at 05:15

## 2023-05-31 RX ADMIN — ALUMINUM HYDROXIDE, MAGNESIUM HYDROXIDE, AND SIMETHICONE 40 ML: 200; 200; 20 SUSPENSION ORAL at 03:46

## 2023-05-31 RX ADMIN — MORPHINE SULFATE 4 MG: 2 INJECTION, SOLUTION INTRAMUSCULAR; INTRAVENOUS at 05:07

## 2023-06-02 LAB
EKG ATRIAL RATE: 98 BPM
EKG DIAGNOSIS: NORMAL
EKG P AXIS: 59 DEGREES
EKG P-R INTERVAL: 174 MS
EKG Q-T INTERVAL: 330 MS
EKG QRS DURATION: 80 MS
EKG QTC CALCULATION (BAZETT): 421 MS
EKG R AXIS: 75 DEGREES
EKG T AXIS: 41 DEGREES
EKG VENTRICULAR RATE: 98 BPM

## 2023-06-09 ENCOUNTER — NURSE TRIAGE (OUTPATIENT)
Dept: OTHER | Facility: CLINIC | Age: 53
End: 2023-06-09

## 2023-06-09 NOTE — TELEPHONE ENCOUNTER
Location of patient: [place]    Subjective: Caller states \"I went to [address] on 5/31/23\"     Current Symptoms: They wanted her to do a CT scan and she was unable to lay on it and asking where she can go for a wider CT table. Recommended disposition:  Information Only Call    Care advice provided, patient verbalizes understanding; denies any other questions or concerns; instructed to call back for any new or worsening symptoms. Outcome:  Advised to call insurance to see who else is covered for CT scans and to contact those locations about he CT scan table sizes. Attention Provider: Thank you for allowing me to participate in the care of your patient. The patient was connected to triage in response to information provided to the ECC/PSC. Please do not respond through this encounter as the response is not directed to a shared pool. Reason for Disposition   [1] Caller requesting NON-URGENT health information AND [2] PCP's office is the best resource     Advised to call insurance to see who else is covered for CT scans and to contact those locations about he CT scan table sizes. Protocols used:  Information Only Call - No Triage-ADULT-

## 2023-07-06 ENCOUNTER — TELEPHONE (OUTPATIENT)
Age: 53
End: 2023-07-06

## 2023-07-10 ENCOUNTER — TELEPHONE (OUTPATIENT)
Age: 53
End: 2023-07-10

## 2023-07-10 NOTE — TELEPHONE ENCOUNTER
[address] left message with answering service requesting medical records on 7.7.23 @ 5:05 pm    Call back num [phone] PACU, home

## 2023-07-10 NOTE — TELEPHONE ENCOUNTER
Returned call to [facility] to let them know that we have not seen the patient in office. They will update their system to reflect this information.

## 2023-08-07 ENCOUNTER — HOSPITAL ENCOUNTER (EMERGENCY)
Facility: HOSPITAL | Age: 53
Discharge: HOME | End: 2023-08-08
Attending: EMERGENCY MEDICINE
Payer: MEDICARE

## 2023-08-07 ENCOUNTER — APPOINTMENT (OUTPATIENT)
Facility: HOSPITAL | Age: 53
End: 2023-08-07
Payer: MEDICARE

## 2023-08-07 DIAGNOSIS — E66.01 MORBID OBESITY WITH BODY MASS INDEX (BMI) OF 60.0 TO 69.9 IN ADULT (HCC): ICD-10-CM

## 2023-08-07 DIAGNOSIS — R06.02 SHORTNESS OF BREATH: Primary | ICD-10-CM

## 2023-08-07 DIAGNOSIS — M79.604 LEG PAIN, LATERAL, RIGHT: ICD-10-CM

## 2023-08-07 DIAGNOSIS — G89.29 OTHER CHRONIC PAIN: ICD-10-CM

## 2023-08-07 LAB
BASE EXCESS BLDV CALC-SCNC: 2.8 MMOL/L
HCO3 BLDV-SCNC: 27.9 MMOL/L (ref 23–28)
PCO2 BLDV: 43.2 MMHG (ref 41–51)
PH BLDV: 7.42 (ref 7.32–7.42)
PO2 BLDV: 123 MMHG (ref 25–40)
SAO2 % BLDV: 98.8 % (ref 65–88)
SERVICE CMNT-IMP: ABNORMAL
SPECIMEN TYPE: ABNORMAL

## 2023-08-07 PROCEDURE — 71045 X-RAY EXAM CHEST 1 VIEW: CPT

## 2023-08-07 PROCEDURE — 99284 EMERGENCY DEPT VISIT MOD MDM: CPT

## 2023-08-07 PROCEDURE — 6370000000 HC RX 637 (ALT 250 FOR IP): Performed by: EMERGENCY MEDICINE

## 2023-08-07 PROCEDURE — 82803 BLOOD GASES ANY COMBINATION: CPT

## 2023-08-07 PROCEDURE — 94640 AIRWAY INHALATION TREATMENT: CPT

## 2023-08-07 PROCEDURE — 94664 DEMO&/EVAL PT USE INHALER: CPT

## 2023-08-07 RX ORDER — PREDNISONE 20 MG/1
40 TABLET ORAL DAILY
Status: DISCONTINUED | OUTPATIENT
Start: 2023-08-08 | End: 2023-08-08 | Stop reason: HOSPADM

## 2023-08-07 RX ORDER — MORPHINE SULFATE 15 MG/1
15 TABLET ORAL
Status: COMPLETED | OUTPATIENT
Start: 2023-08-07 | End: 2023-08-08

## 2023-08-07 RX ORDER — LIDOCAINE 4 G/G
1 PATCH TOPICAL
Status: COMPLETED | OUTPATIENT
Start: 2023-08-07 | End: 2023-08-08

## 2023-08-07 RX ORDER — IPRATROPIUM BROMIDE AND ALBUTEROL SULFATE 2.5; .5 MG/3ML; MG/3ML
1 SOLUTION RESPIRATORY (INHALATION)
Status: COMPLETED | OUTPATIENT
Start: 2023-08-07 | End: 2023-08-07

## 2023-08-07 RX ORDER — METHOCARBAMOL 750 MG/1
750 TABLET, FILM COATED ORAL
Status: COMPLETED | OUTPATIENT
Start: 2023-08-07 | End: 2023-08-07

## 2023-08-07 RX ORDER — PANTOPRAZOLE SODIUM 40 MG/1
40 TABLET, DELAYED RELEASE ORAL
Status: COMPLETED | OUTPATIENT
Start: 2023-08-07 | End: 2023-08-07

## 2023-08-07 RX ORDER — FUROSEMIDE 40 MG/1
40 TABLET ORAL
Status: COMPLETED | OUTPATIENT
Start: 2023-08-07 | End: 2023-08-07

## 2023-08-07 RX ORDER — MORPHINE SULFATE 15 MG/1
15 TABLET, FILM COATED, EXTENDED RELEASE ORAL
Status: DISCONTINUED | OUTPATIENT
Start: 2023-08-07 | End: 2023-08-07

## 2023-08-07 RX ADMIN — IPRATROPIUM BROMIDE AND ALBUTEROL SULFATE 1 DOSE: .5; 3 SOLUTION RESPIRATORY (INHALATION) at 23:36

## 2023-08-07 RX ADMIN — ALUMINUM HYDROXIDE AND MAGNESIUM HYDROXIDE 30 ML: 200; 200 SUSPENSION ORAL at 23:25

## 2023-08-07 RX ADMIN — PANTOPRAZOLE SODIUM 40 MG: 40 TABLET, DELAYED RELEASE ORAL at 23:26

## 2023-08-07 RX ADMIN — FUROSEMIDE 40 MG: 40 TABLET ORAL at 23:26

## 2023-08-07 RX ADMIN — METHOCARBAMOL TABLETS 750 MG: 750 TABLET, COATED ORAL at 23:27

## 2023-08-07 RX ADMIN — METOPROLOL TARTRATE 25 MG: 25 TABLET, FILM COATED ORAL at 23:26

## 2023-08-07 ASSESSMENT — PAIN DESCRIPTION - DESCRIPTORS: DESCRIPTORS: ACHING

## 2023-08-07 ASSESSMENT — PAIN - FUNCTIONAL ASSESSMENT: PAIN_FUNCTIONAL_ASSESSMENT: 0-10

## 2023-08-07 ASSESSMENT — PAIN DESCRIPTION - LOCATION: LOCATION: BACK;LEG

## 2023-08-07 ASSESSMENT — PAIN DESCRIPTION - FREQUENCY: FREQUENCY: CONTINUOUS

## 2023-08-07 ASSESSMENT — PAIN SCALES - GENERAL: PAINLEVEL_OUTOF10: 7

## 2023-08-08 ENCOUNTER — APPOINTMENT (OUTPATIENT)
Facility: HOSPITAL | Age: 53
End: 2023-08-08
Payer: MEDICARE

## 2023-08-08 VITALS
SYSTOLIC BLOOD PRESSURE: 110 MMHG | HEART RATE: 76 BPM | HEIGHT: 67 IN | WEIGHT: 293 LBS | OXYGEN SATURATION: 94 % | BODY MASS INDEX: 45.99 KG/M2 | DIASTOLIC BLOOD PRESSURE: 71 MMHG | RESPIRATION RATE: 16 BRPM | TEMPERATURE: 97.5 F

## 2023-08-08 LAB
BASE EXCESS BLD CALC-SCNC: 4.9 MMOL/L
GLUCOSE BLD STRIP.AUTO-MCNC: 120 MG/DL (ref 65–117)
HCO3 BLD-SCNC: 31.3 MMOL/L (ref 22–26)
PCO2 BLD: 50.7 MMHG (ref 35–45)
PH BLD: 7.4 (ref 7.35–7.45)
PO2 BLD: 83 MMHG (ref 80–100)
SAO2 % BLD: 95.9 % (ref 92–97)
SERVICE CMNT-IMP: ABNORMAL
SPECIMEN TYPE: ABNORMAL

## 2023-08-08 PROCEDURE — 82803 BLOOD GASES ANY COMBINATION: CPT

## 2023-08-08 PROCEDURE — 82962 GLUCOSE BLOOD TEST: CPT

## 2023-08-08 PROCEDURE — 6370000000 HC RX 637 (ALT 250 FOR IP): Performed by: EMERGENCY MEDICINE

## 2023-08-08 RX ORDER — ACETAMINOPHEN 500 MG
1000 TABLET ORAL
Status: DISCONTINUED | OUTPATIENT
Start: 2023-08-08 | End: 2023-08-08 | Stop reason: HOSPADM

## 2023-08-08 RX ORDER — ONDANSETRON 4 MG/1
8 TABLET, ORALLY DISINTEGRATING ORAL ONCE
Status: DISCONTINUED | OUTPATIENT
Start: 2023-08-08 | End: 2023-08-08 | Stop reason: HOSPADM

## 2023-08-08 RX ORDER — DIAZEPAM 5 MG/1
5 TABLET ORAL ONCE
Status: DISCONTINUED | OUTPATIENT
Start: 2023-08-08 | End: 2023-08-08 | Stop reason: HOSPADM

## 2023-08-08 RX ADMIN — MORPHINE SULFATE 15 MG: 15 TABLET ORAL at 00:07

## 2023-08-08 ASSESSMENT — ENCOUNTER SYMPTOMS
CHEST TIGHTNESS: 1
NAUSEA: 0
VOMITING: 0
SHORTNESS OF BREATH: 1
WHEEZING: 1
ABDOMINAL PAIN: 0
DIARRHEA: 0

## 2023-08-08 ASSESSMENT — PAIN SCALES - GENERAL: PAINLEVEL_OUTOF10: 10

## 2023-08-08 NOTE — ED NOTES
Pt. Agreeable with ER stretcher instead of wheelchair after writer reassures here there are no bariatric stretchers available. Pt. Continues to demand phone, patient advocate, charge nurse. Pt's room rearranged to allow pt to watch TV. Writer got IV access and bloodwork, pt demanded it be taken out as \"the flush was uncomfortable and I was going to tell you not to stick me there. \"      Jazz Berkowitz RN  08/07/23 8545

## 2023-08-08 NOTE — ED TRIAGE NOTES
Pt. Presents with SOB worse than normal along with bilateral leg pain and low back pain. Pt. States she came from home, typically wears 5LNC at baseline, is maintaining a saturation of 96% on [email or web address]. Pt. Has wheezes and crackles in lungs on auscultation, pt states she took her water pill this morning but not yet this evening. Pt. Denies chest pain.

## 2023-08-08 NOTE — DISCHARGE INSTRUCTIONS
List of Pain Management Specialists:    Jinny Viramontes M.D. [phone] Pain Management  Adan Betancourt M.D. [phone] Physical Medicine and Kallie Abdi M.D. [phone] Physical Medicine and Verlyn Nyhan., M.D. [phone] Pain Management  Jose Alfredo Humphreys M.D. [phone] Pain Management    Dr. Kennedy Rosales, [address], [facility], [address] [facility] [address]  [phone]    Pain Management Center                            RACHELL Patino MD DO  [address] [address], [place]                               [address] [place], [address] [place] [address],[facility]. [address], [address] [facility]  [phone]    Dr. Matthew Messer. [facility], [address]  [phone]                                                                            Dr. Uriah Donaldson, [facility] [address] [facility] [address]  [address] [facility]                                           [address] [facility], [address] [facility], [address]  www. [email or web address]                                            [phone]

## 2023-08-08 NOTE — ED NOTES
Pt. Unable to tolerate Xray table for hip xray.  Dr. Isaodra Hayes aware     Moe Ruiz, RN  08/08/23 6887

## 2023-08-08 NOTE — ED NOTES
Pt. Vannessa Mcleod on stretcher, will hold meds until patient wakes up to promote rest.     Page JULY Monsalve  08/08/23 4197

## 2023-08-08 NOTE — ED PROVIDER NOTES
EMERGENCY DEPARTMENT HISTORY AND PHYSICAL EXAM     ----------------------------------------------------------------------------  Please note that this dictation was completed with [employer], the computer voice recognition software. Quite often unanticipated grammatical, syntax, homophones, and other interpretive errors are inadvertently transcribed by the computer software. Please disregard these errors. Please excuse any errors that have escaped final proofreading  ----------------------------------------------------------------------------      Date: 8/7/2023  Patient Name: Cailin Ovalle      HISTORY OF PRESENT ILLNESS     Chief Complaint   Patient presents with    Shortness of Breath       History obtainted from:  Patient    Other independent source of history: EMS    HPI: Cailin Ovalle is a 48 y.o. female, with significant pmhx of chronic respiratory failure, diabetes, CAD, COPD, hypertension, congestive heart failure, paroxysmal SVT, chronic pain, lung cancer, who presents via EMS to the ED with c/o worsening shortness of breath than her baseline. Patient arrives by EMS wearing her baseline 5 L nasal cannula. Patient reports doing her breathing treatments of albuterol at home without much relief. Has been on daily prednisone. Patient also followed by palliative care for her chronic pain. Notes that she was recently in hospice but subsequently removed herself due to the fact that she wanted to seek medical treatment for her newly diagnosed lung cancer. Patient is currently planning to follow-up with [facility] hematology oncology for further evaluation and treatment options. Patient relays several month history of issues getting to her appointments due to her mobility issues. Patient is morbidly obese and typically utilizes a walker in her house.   Notes that she has a wheelchair outside her house with associated ramps but cannot get the wheelchair into her house due to the width of it being unable to

## 2023-08-08 NOTE — ED NOTES
Pt. Demanding phone, bariatric stretcher or a recliner. Pt states she called before presenting to the ED with EMS and was told that there would be a recliner here for her. Pt. States she wants to speak to the advocate and doesn't want this RN in her room. Pt states, \"why haven't I seen a doctor yet. \" Writer explains the physicians are busy and will be in ASAP. Pt. States, \"this is ridiculous, I need the charge nurse. \" Jerri, the charge RN looked for a recliner with no success and explained that to this RN who explained it to the patient. Pt. Continues to demand to speak to an \"advocate. \"      Lorena Navarrete RN  08/07/23 7146

## 2023-08-09 LAB
EKG ATRIAL RATE: 123 BPM
EKG DIAGNOSIS: NORMAL
EKG P AXIS: 47 DEGREES
EKG P-R INTERVAL: 178 MS
EKG Q-T INTERVAL: 310 MS
EKG QRS DURATION: 76 MS
EKG QTC CALCULATION (BAZETT): 443 MS
EKG R AXIS: 64 DEGREES
EKG T AXIS: 27 DEGREES
EKG VENTRICULAR RATE: 123 BPM

## 2023-08-20 ENCOUNTER — APPOINTMENT (OUTPATIENT)
Facility: HOSPITAL | Age: 53
DRG: 543 | End: 2023-08-20
Payer: MEDICARE

## 2023-08-20 ENCOUNTER — HOSPITAL ENCOUNTER (INPATIENT)
Facility: HOSPITAL | Age: 53
LOS: 8 days | Discharge: NURSING, REHAB OR LONG-TERM CARE | DRG: 543 | End: 2023-08-29
Attending: EMERGENCY MEDICINE | Admitting: STUDENT IN AN ORGANIZED HEALTH CARE EDUCATION/TRAINING PROGRAM
Payer: MEDICARE

## 2023-08-20 DIAGNOSIS — L03.116 CELLULITIS OF LEFT LOWER EXTREMITY: ICD-10-CM

## 2023-08-20 DIAGNOSIS — C79.9 METASTATIC MALIGNANT NEOPLASM, UNSPECIFIED SITE (HCC): ICD-10-CM

## 2023-08-20 DIAGNOSIS — K59.00 CONSTIPATION, UNSPECIFIED CONSTIPATION TYPE: Primary | ICD-10-CM

## 2023-08-20 LAB
ALBUMIN SERPL-MCNC: 3.3 G/DL (ref 3.5–5)
ALBUMIN/GLOB SERPL: 1 (ref 1.1–2.2)
ALP SERPL-CCNC: 310 U/L (ref 45–117)
ALT SERPL-CCNC: 47 U/L (ref 12–78)
ANION GAP SERPL CALC-SCNC: 3 MMOL/L (ref 5–15)
AST SERPL-CCNC: 30 U/L (ref 15–37)
BASOPHILS # BLD: 0.1 K/UL (ref 0–0.1)
BASOPHILS NFR BLD: 1 % (ref 0–1)
BILIRUB SERPL-MCNC: 0.4 MG/DL (ref 0.2–1)
BUN SERPL-MCNC: 18 MG/DL (ref 6–20)
BUN/CREAT SERPL: 20 (ref 12–20)
CALCIUM SERPL-MCNC: 10.9 MG/DL (ref 8.5–10.1)
CHLORIDE SERPL-SCNC: 105 MMOL/L (ref 97–108)
CO2 SERPL-SCNC: 32 MMOL/L (ref 21–32)
CREAT SERPL-MCNC: 0.91 MG/DL (ref 0.55–1.02)
DIFFERENTIAL METHOD BLD: ABNORMAL
EOSINOPHIL # BLD: 0.1 K/UL (ref 0–0.4)
EOSINOPHIL NFR BLD: 1 % (ref 0–7)
ERYTHROCYTE [DISTWIDTH] IN BLOOD BY AUTOMATED COUNT: 12.7 % (ref 11.5–14.5)
GLOBULIN SER CALC-MCNC: 3.4 G/DL (ref 2–4)
GLUCOSE SERPL-MCNC: 147 MG/DL (ref 65–100)
HCT VFR BLD AUTO: 41.6 % (ref 35–47)
HGB BLD-MCNC: 13 G/DL (ref 11.5–16)
IMM GRANULOCYTES # BLD AUTO: 0.1 K/UL (ref 0–0.04)
IMM GRANULOCYTES NFR BLD AUTO: 1 % (ref 0–0.5)
LYMPHOCYTES # BLD: 1 K/UL (ref 0.8–3.5)
LYMPHOCYTES NFR BLD: 12 % (ref 12–49)
MCH RBC QN AUTO: 30.7 PG (ref 26–34)
MCHC RBC AUTO-ENTMCNC: 31.3 G/DL (ref 30–36.5)
MCV RBC AUTO: 98.1 FL (ref 80–99)
MONOCYTES # BLD: 0.9 K/UL (ref 0–1)
MONOCYTES NFR BLD: 11 % (ref 5–13)
NEUTS SEG # BLD: 6.3 K/UL (ref 1.8–8)
NEUTS SEG NFR BLD: 74 % (ref 32–75)
NRBC # BLD: 0 K/UL (ref 0–0.01)
NRBC BLD-RTO: 0 PER 100 WBC
NT PRO BNP: 243 PG/ML
PLATELET # BLD AUTO: 239 K/UL (ref 150–400)
PMV BLD AUTO: 10.3 FL (ref 8.9–12.9)
POTASSIUM SERPL-SCNC: 4.1 MMOL/L (ref 3.5–5.1)
PROT SERPL-MCNC: 6.7 G/DL (ref 6.4–8.2)
RBC # BLD AUTO: 4.24 M/UL (ref 3.8–5.2)
SODIUM SERPL-SCNC: 140 MMOL/L (ref 136–145)
TROPONIN I SERPL HS-MCNC: 29 NG/L (ref 0–51)
WBC # BLD AUTO: 8.5 K/UL (ref 3.6–11)

## 2023-08-20 PROCEDURE — 99285 EMERGENCY DEPT VISIT HI MDM: CPT

## 2023-08-20 PROCEDURE — 96374 THER/PROPH/DIAG INJ IV PUSH: CPT

## 2023-08-20 PROCEDURE — 93005 ELECTROCARDIOGRAM TRACING: CPT | Performed by: EMERGENCY MEDICINE

## 2023-08-20 PROCEDURE — 80053 COMPREHEN METABOLIC PANEL: CPT

## 2023-08-20 PROCEDURE — 36415 COLL VENOUS BLD VENIPUNCTURE: CPT

## 2023-08-20 PROCEDURE — 2500000003 HC RX 250 WO HCPCS: Performed by: EMERGENCY MEDICINE

## 2023-08-20 PROCEDURE — 85025 COMPLETE CBC W/AUTO DIFF WBC: CPT

## 2023-08-20 PROCEDURE — 71045 X-RAY EXAM CHEST 1 VIEW: CPT

## 2023-08-20 PROCEDURE — 84484 ASSAY OF TROPONIN QUANT: CPT

## 2023-08-20 PROCEDURE — 83880 ASSAY OF NATRIURETIC PEPTIDE: CPT

## 2023-08-20 RX ORDER — HYDROMORPHONE HYDROCHLORIDE 1 MG/ML
1 INJECTION, SOLUTION INTRAMUSCULAR; INTRAVENOUS; SUBCUTANEOUS
Status: COMPLETED | OUTPATIENT
Start: 2023-08-20 | End: 2023-08-20

## 2023-08-20 RX ADMIN — HYDROMORPHONE HYDROCHLORIDE 1 MG: 1 INJECTION, SOLUTION INTRAMUSCULAR; INTRAVENOUS; SUBCUTANEOUS at 23:59

## 2023-08-20 ASSESSMENT — PAIN DESCRIPTION - LOCATION: LOCATION: BACK;LEG

## 2023-08-20 ASSESSMENT — PAIN DESCRIPTION - DESCRIPTORS: DESCRIPTORS: ACHING

## 2023-08-20 ASSESSMENT — PAIN DESCRIPTION - ORIENTATION: ORIENTATION: RIGHT;LEFT;MID

## 2023-08-20 ASSESSMENT — PAIN - FUNCTIONAL ASSESSMENT: PAIN_FUNCTIONAL_ASSESSMENT: 0-10

## 2023-08-20 ASSESSMENT — PAIN DESCRIPTION - PAIN TYPE: TYPE: CHRONIC PAIN

## 2023-08-20 ASSESSMENT — LIFESTYLE VARIABLES
HOW MANY STANDARD DRINKS CONTAINING ALCOHOL DO YOU HAVE ON A TYPICAL DAY: PATIENT DOES NOT DRINK
HOW OFTEN DO YOU HAVE A DRINK CONTAINING ALCOHOL: NEVER

## 2023-08-20 ASSESSMENT — PAIN SCALES - GENERAL: PAINLEVEL_OUTOF10: 7

## 2023-08-21 ENCOUNTER — APPOINTMENT (OUTPATIENT)
Facility: HOSPITAL | Age: 53
DRG: 543 | End: 2023-08-21
Payer: MEDICARE

## 2023-08-21 PROBLEM — C79.49 METASTASIS OF NEOPLASM TO SPINAL CANAL (HCC): Status: ACTIVE | Noted: 2023-08-21

## 2023-08-21 PROBLEM — C79.51 CARCINOMA OF RIGHT BREAST METASTATIC TO BONE (HCC): Status: ACTIVE | Noted: 2023-08-21

## 2023-08-21 PROBLEM — M48.00 SPINAL STENOSIS: Status: ACTIVE | Noted: 2023-08-21

## 2023-08-21 PROBLEM — K59.00 CONSTIPATION: Status: ACTIVE | Noted: 2023-08-21

## 2023-08-21 PROBLEM — R62.7 FAILURE TO THRIVE IN ADULT: Status: ACTIVE | Noted: 2023-08-21

## 2023-08-21 PROBLEM — C50.911 CARCINOMA OF RIGHT BREAST METASTATIC TO BONE (HCC): Status: ACTIVE | Noted: 2023-08-21

## 2023-08-21 PROBLEM — G89.3 CANCER ASSOCIATED PAIN: Status: ACTIVE | Noted: 2023-08-21

## 2023-08-21 PROBLEM — M54.9 BACK PAIN: Status: ACTIVE | Noted: 2023-08-21

## 2023-08-21 PROBLEM — Z51.5 PALLIATIVE CARE BY SPECIALIST: Status: ACTIVE | Noted: 2023-08-21

## 2023-08-21 LAB
ANION GAP SERPL CALC-SCNC: 0 MMOL/L (ref 5–15)
BASOPHILS # BLD: 0.1 K/UL (ref 0–0.1)
BASOPHILS NFR BLD: 1 % (ref 0–1)
BUN SERPL-MCNC: 17 MG/DL (ref 6–20)
BUN/CREAT SERPL: 18 (ref 12–20)
CALCIUM SERPL-MCNC: 11 MG/DL (ref 8.5–10.1)
CHLORIDE SERPL-SCNC: 105 MMOL/L (ref 97–108)
CO2 SERPL-SCNC: 36 MMOL/L (ref 21–32)
CREAT SERPL-MCNC: 0.93 MG/DL (ref 0.55–1.02)
DIFFERENTIAL METHOD BLD: ABNORMAL
EKG ATRIAL RATE: 76 BPM
EKG DIAGNOSIS: NORMAL
EKG P AXIS: 28 DEGREES
EKG P-R INTERVAL: 192 MS
EKG Q-T INTERVAL: 366 MS
EKG QRS DURATION: 82 MS
EKG QTC CALCULATION (BAZETT): 411 MS
EKG R AXIS: 62 DEGREES
EKG T AXIS: 28 DEGREES
EKG VENTRICULAR RATE: 76 BPM
EOSINOPHIL # BLD: 0.1 K/UL (ref 0–0.4)
EOSINOPHIL NFR BLD: 1 % (ref 0–7)
ERYTHROCYTE [DISTWIDTH] IN BLOOD BY AUTOMATED COUNT: 12.8 % (ref 11.5–14.5)
GLUCOSE BLD STRIP.AUTO-MCNC: 108 MG/DL (ref 65–117)
GLUCOSE BLD STRIP.AUTO-MCNC: 128 MG/DL (ref 65–117)
GLUCOSE BLD STRIP.AUTO-MCNC: 179 MG/DL (ref 65–117)
GLUCOSE BLD STRIP.AUTO-MCNC: 185 MG/DL (ref 65–117)
GLUCOSE BLD STRIP.AUTO-MCNC: 195 MG/DL (ref 65–117)
GLUCOSE SERPL-MCNC: 118 MG/DL (ref 65–100)
HCT VFR BLD AUTO: 41.2 % (ref 35–47)
HGB BLD-MCNC: 12.6 G/DL (ref 11.5–16)
IMM GRANULOCYTES # BLD AUTO: 0.1 K/UL (ref 0–0.04)
IMM GRANULOCYTES NFR BLD AUTO: 1 % (ref 0–0.5)
LYMPHOCYTES # BLD: 0.8 K/UL (ref 0.8–3.5)
LYMPHOCYTES NFR BLD: 8 % (ref 12–49)
MCH RBC QN AUTO: 30.5 PG (ref 26–34)
MCHC RBC AUTO-ENTMCNC: 30.6 G/DL (ref 30–36.5)
MCV RBC AUTO: 99.8 FL (ref 80–99)
MONOCYTES # BLD: 1 K/UL (ref 0–1)
MONOCYTES NFR BLD: 10 % (ref 5–13)
NEUTS SEG # BLD: 8 K/UL (ref 1.8–8)
NEUTS SEG NFR BLD: 79 % (ref 32–75)
NRBC # BLD: 0 K/UL (ref 0–0.01)
NRBC BLD-RTO: 0 PER 100 WBC
PLATELET # BLD AUTO: 229 K/UL (ref 150–400)
PMV BLD AUTO: 10 FL (ref 8.9–12.9)
POTASSIUM SERPL-SCNC: 4.4 MMOL/L (ref 3.5–5.1)
RBC # BLD AUTO: 4.13 M/UL (ref 3.8–5.2)
SERVICE CMNT-IMP: ABNORMAL
SERVICE CMNT-IMP: NORMAL
SODIUM SERPL-SCNC: 141 MMOL/L (ref 136–145)
WBC # BLD AUTO: 9.9 K/UL (ref 3.6–11)

## 2023-08-21 PROCEDURE — 94640 AIRWAY INHALATION TREATMENT: CPT

## 2023-08-21 PROCEDURE — 6370000000 HC RX 637 (ALT 250 FOR IP): Performed by: STUDENT IN AN ORGANIZED HEALTH CARE EDUCATION/TRAINING PROGRAM

## 2023-08-21 PROCEDURE — 97535 SELF CARE MNGMENT TRAINING: CPT

## 2023-08-21 PROCEDURE — 6360000002 HC RX W HCPCS: Performed by: INTERNAL MEDICINE

## 2023-08-21 PROCEDURE — 1100000003 HC PRIVATE W/ TELEMETRY

## 2023-08-21 PROCEDURE — 6370000000 HC RX 637 (ALT 250 FOR IP): Performed by: INTERNAL MEDICINE

## 2023-08-21 PROCEDURE — 99223 1ST HOSP IP/OBS HIGH 75: CPT | Performed by: INTERNAL MEDICINE

## 2023-08-21 PROCEDURE — 6360000002 HC RX W HCPCS: Performed by: STUDENT IN AN ORGANIZED HEALTH CARE EDUCATION/TRAINING PROGRAM

## 2023-08-21 PROCEDURE — 2580000003 HC RX 258: Performed by: STUDENT IN AN ORGANIZED HEALTH CARE EDUCATION/TRAINING PROGRAM

## 2023-08-21 PROCEDURE — 82962 GLUCOSE BLOOD TEST: CPT

## 2023-08-21 PROCEDURE — 99222 1ST HOSP IP/OBS MODERATE 55: CPT | Performed by: STUDENT IN AN ORGANIZED HEALTH CARE EDUCATION/TRAINING PROGRAM

## 2023-08-21 PROCEDURE — 97110 THERAPEUTIC EXERCISES: CPT

## 2023-08-21 PROCEDURE — 97162 PT EVAL MOD COMPLEX 30 MIN: CPT

## 2023-08-21 PROCEDURE — 71275 CT ANGIOGRAPHY CHEST: CPT

## 2023-08-21 PROCEDURE — 85025 COMPLETE CBC W/AUTO DIFF WBC: CPT

## 2023-08-21 PROCEDURE — 2700000000 HC OXYGEN THERAPY PER DAY

## 2023-08-21 PROCEDURE — 94761 N-INVAS EAR/PLS OXIMETRY MLT: CPT

## 2023-08-21 PROCEDURE — 97167 OT EVAL HIGH COMPLEX 60 MIN: CPT

## 2023-08-21 PROCEDURE — 36415 COLL VENOUS BLD VENIPUNCTURE: CPT

## 2023-08-21 PROCEDURE — 94664 DEMO&/EVAL PT USE INHALER: CPT

## 2023-08-21 PROCEDURE — 97530 THERAPEUTIC ACTIVITIES: CPT

## 2023-08-21 PROCEDURE — 6360000004 HC RX CONTRAST MEDICATION: Performed by: EMERGENCY MEDICINE

## 2023-08-21 PROCEDURE — 80048 BASIC METABOLIC PNL TOTAL CA: CPT

## 2023-08-21 PROCEDURE — 74177 CT ABD & PELVIS W/CONTRAST: CPT

## 2023-08-21 RX ORDER — ASPIRIN 81 MG/1
81 TABLET, CHEWABLE ORAL DAILY
Status: DISCONTINUED | OUTPATIENT
Start: 2023-08-21 | End: 2023-08-29 | Stop reason: HOSPADM

## 2023-08-21 RX ORDER — SODIUM CHLORIDE 0.9 % (FLUSH) 0.9 %
5-40 SYRINGE (ML) INJECTION EVERY 12 HOURS SCHEDULED
Status: DISCONTINUED | OUTPATIENT
Start: 2023-08-21 | End: 2023-08-29 | Stop reason: HOSPADM

## 2023-08-21 RX ORDER — ONDANSETRON 4 MG/1
4 TABLET, ORALLY DISINTEGRATING ORAL EVERY 8 HOURS PRN
Status: DISCONTINUED | OUTPATIENT
Start: 2023-08-21 | End: 2023-08-29 | Stop reason: HOSPADM

## 2023-08-21 RX ORDER — SENNA AND DOCUSATE SODIUM 50; 8.6 MG/1; MG/1
2 TABLET, FILM COATED ORAL 2 TIMES DAILY
Status: DISCONTINUED | OUTPATIENT
Start: 2023-08-21 | End: 2023-08-23

## 2023-08-21 RX ORDER — ACETAMINOPHEN 650 MG/1
650 SUPPOSITORY RECTAL EVERY 6 HOURS PRN
Status: DISCONTINUED | OUTPATIENT
Start: 2023-08-21 | End: 2023-08-29 | Stop reason: HOSPADM

## 2023-08-21 RX ORDER — NYSTATIN 100000 U/G
CREAM TOPICAL 2 TIMES DAILY
Status: DISCONTINUED | OUTPATIENT
Start: 2023-08-21 | End: 2023-08-29 | Stop reason: HOSPADM

## 2023-08-21 RX ORDER — PREDNISONE 20 MG/1
20 TABLET ORAL DAILY
Status: DISCONTINUED | OUTPATIENT
Start: 2023-08-21 | End: 2023-08-29 | Stop reason: HOSPADM

## 2023-08-21 RX ORDER — LORAZEPAM 2 MG/ML
2 INJECTION INTRAMUSCULAR ONCE
Status: DISCONTINUED | OUTPATIENT
Start: 2023-08-21 | End: 2023-08-21

## 2023-08-21 RX ORDER — ACETAMINOPHEN 325 MG/1
1300 TABLET ORAL 2 TIMES DAILY
Status: DISCONTINUED | OUTPATIENT
Start: 2023-08-21 | End: 2023-08-29 | Stop reason: HOSPADM

## 2023-08-21 RX ORDER — ACETAMINOPHEN 325 MG/1
650 TABLET ORAL EVERY 6 HOURS PRN
Status: DISCONTINUED | OUTPATIENT
Start: 2023-08-21 | End: 2023-08-21

## 2023-08-21 RX ORDER — PREDNISONE 20 MG/1
20 TABLET ORAL DAILY
COMMUNITY

## 2023-08-21 RX ORDER — FLUTICASONE PROPIONATE 50 MCG
2 SPRAY, SUSPENSION (ML) NASAL DAILY
Status: DISCONTINUED | OUTPATIENT
Start: 2023-08-21 | End: 2023-08-29 | Stop reason: HOSPADM

## 2023-08-21 RX ORDER — ATORVASTATIN CALCIUM 10 MG/1
10 TABLET, FILM COATED ORAL DAILY
Status: DISCONTINUED | OUTPATIENT
Start: 2023-08-21 | End: 2023-08-29 | Stop reason: HOSPADM

## 2023-08-21 RX ORDER — INSULIN GLARGINE 100 [IU]/ML
10 INJECTION, SOLUTION SUBCUTANEOUS NIGHTLY
Status: DISCONTINUED | OUTPATIENT
Start: 2023-08-21 | End: 2023-08-25

## 2023-08-21 RX ORDER — POLYETHYLENE GLYCOL 3350 17 G/17G
17 POWDER, FOR SOLUTION ORAL 2 TIMES DAILY
Status: DISCONTINUED | OUTPATIENT
Start: 2023-08-21 | End: 2023-08-23

## 2023-08-21 RX ORDER — OXYCODONE HYDROCHLORIDE 5 MG/1
5 TABLET ORAL EVERY 4 HOURS PRN
Status: DISCONTINUED | OUTPATIENT
Start: 2023-08-21 | End: 2023-08-21 | Stop reason: ALTCHOICE

## 2023-08-21 RX ORDER — FUROSEMIDE 40 MG/1
40 TABLET ORAL 2 TIMES DAILY
Status: DISCONTINUED | OUTPATIENT
Start: 2023-08-21 | End: 2023-08-29 | Stop reason: HOSPADM

## 2023-08-21 RX ORDER — MORPHINE SULFATE 15 MG/1
15 TABLET ORAL EVERY 4 HOURS PRN
Status: DISCONTINUED | OUTPATIENT
Start: 2023-08-21 | End: 2023-08-22

## 2023-08-21 RX ORDER — INSULIN LISPRO 100 [IU]/ML
0-4 INJECTION, SOLUTION INTRAVENOUS; SUBCUTANEOUS NIGHTLY
Status: DISCONTINUED | OUTPATIENT
Start: 2023-08-21 | End: 2023-08-29 | Stop reason: HOSPADM

## 2023-08-21 RX ORDER — SODIUM CHLORIDE 0.9 % (FLUSH) 0.9 %
5-40 SYRINGE (ML) INJECTION PRN
Status: DISCONTINUED | OUTPATIENT
Start: 2023-08-21 | End: 2023-08-29 | Stop reason: HOSPADM

## 2023-08-21 RX ORDER — SPIRONOLACTONE 25 MG/1
25 TABLET ORAL DAILY
Status: DISCONTINUED | OUTPATIENT
Start: 2023-08-21 | End: 2023-08-29 | Stop reason: HOSPADM

## 2023-08-21 RX ORDER — INSULIN LISPRO 100 [IU]/ML
0-8 INJECTION, SOLUTION INTRAVENOUS; SUBCUTANEOUS
Status: DISCONTINUED | OUTPATIENT
Start: 2023-08-21 | End: 2023-08-29 | Stop reason: HOSPADM

## 2023-08-21 RX ORDER — PANTOPRAZOLE SODIUM 40 MG/1
40 TABLET, DELAYED RELEASE ORAL
Status: DISCONTINUED | OUTPATIENT
Start: 2023-08-21 | End: 2023-08-29 | Stop reason: HOSPADM

## 2023-08-21 RX ORDER — POLYETHYLENE GLYCOL 3350 17 G/17G
17 POWDER, FOR SOLUTION ORAL DAILY PRN
Status: DISCONTINUED | OUTPATIENT
Start: 2023-08-21 | End: 2023-08-21

## 2023-08-21 RX ORDER — ENOXAPARIN SODIUM 100 MG/ML
40 INJECTION SUBCUTANEOUS 2 TIMES DAILY
Status: DISCONTINUED | OUTPATIENT
Start: 2023-08-21 | End: 2023-08-29 | Stop reason: HOSPADM

## 2023-08-21 RX ORDER — SENNA AND DOCUSATE SODIUM 50; 8.6 MG/1; MG/1
2 TABLET, FILM COATED ORAL DAILY
Status: DISCONTINUED | OUTPATIENT
Start: 2023-08-21 | End: 2023-08-21

## 2023-08-21 RX ORDER — ANASTROZOLE 1 MG/1
1 TABLET ORAL DAILY
Status: DISCONTINUED | OUTPATIENT
Start: 2023-08-21 | End: 2023-08-29 | Stop reason: HOSPADM

## 2023-08-21 RX ORDER — METHOCARBAMOL 500 MG/1
750 TABLET, FILM COATED ORAL 3 TIMES DAILY
Status: DISCONTINUED | OUTPATIENT
Start: 2023-08-21 | End: 2023-08-29 | Stop reason: HOSPADM

## 2023-08-21 RX ORDER — IPRATROPIUM BROMIDE AND ALBUTEROL SULFATE 2.5; .5 MG/3ML; MG/3ML
1 SOLUTION RESPIRATORY (INHALATION) EVERY 4 HOURS PRN
Status: DISCONTINUED | OUTPATIENT
Start: 2023-08-21 | End: 2023-08-29 | Stop reason: HOSPADM

## 2023-08-21 RX ORDER — CYCLOBENZAPRINE HCL 10 MG
5 TABLET ORAL 2 TIMES DAILY
Status: DISCONTINUED | OUTPATIENT
Start: 2023-08-21 | End: 2023-08-21

## 2023-08-21 RX ORDER — DEXTROSE MONOHYDRATE 100 MG/ML
INJECTION, SOLUTION INTRAVENOUS CONTINUOUS PRN
Status: DISCONTINUED | OUTPATIENT
Start: 2023-08-21 | End: 2023-08-29 | Stop reason: HOSPADM

## 2023-08-21 RX ORDER — ONDANSETRON 2 MG/ML
4 INJECTION INTRAMUSCULAR; INTRAVENOUS EVERY 6 HOURS PRN
Status: DISCONTINUED | OUTPATIENT
Start: 2023-08-21 | End: 2023-08-29 | Stop reason: HOSPADM

## 2023-08-21 RX ORDER — HYDROMORPHONE HYDROCHLORIDE 1 MG/ML
0.5 INJECTION, SOLUTION INTRAMUSCULAR; INTRAVENOUS; SUBCUTANEOUS EVERY 4 HOURS PRN
Status: DISCONTINUED | OUTPATIENT
Start: 2023-08-21 | End: 2023-08-22

## 2023-08-21 RX ORDER — SODIUM CHLORIDE 9 MG/ML
INJECTION, SOLUTION INTRAVENOUS PRN
Status: DISCONTINUED | OUTPATIENT
Start: 2023-08-21 | End: 2023-08-29 | Stop reason: HOSPADM

## 2023-08-21 RX ORDER — LORAZEPAM 2 MG/ML
2 INJECTION INTRAMUSCULAR ONCE
Status: COMPLETED | OUTPATIENT
Start: 2023-08-21 | End: 2023-08-21

## 2023-08-21 RX ADMIN — IOPAMIDOL 100 ML: 755 INJECTION, SOLUTION INTRAVENOUS at 00:47

## 2023-08-21 RX ADMIN — METOPROLOL TARTRATE 25 MG: 25 TABLET, FILM COATED ORAL at 08:50

## 2023-08-21 RX ADMIN — INSULIN GLARGINE 10 UNITS: 100 INJECTION, SOLUTION SUBCUTANEOUS at 21:09

## 2023-08-21 RX ADMIN — POLYETHYLENE GLYCOL 3350 17 G: 17 POWDER, FOR SOLUTION ORAL at 21:08

## 2023-08-21 RX ADMIN — ACETAMINOPHEN 1300 MG: 325 TABLET ORAL at 21:09

## 2023-08-21 RX ADMIN — ARFORMOTEROL TARTRATE: 15 SOLUTION RESPIRATORY (INHALATION) at 09:53

## 2023-08-21 RX ADMIN — ACETAMINOPHEN 1300 MG: 325 TABLET ORAL at 11:52

## 2023-08-21 RX ADMIN — FUROSEMIDE 40 MG: 40 TABLET ORAL at 08:50

## 2023-08-21 RX ADMIN — ENOXAPARIN SODIUM 40 MG: 100 INJECTION SUBCUTANEOUS at 21:08

## 2023-08-21 RX ADMIN — SODIUM CHLORIDE, PRESERVATIVE FREE 10 ML: 5 INJECTION INTRAVENOUS at 08:53

## 2023-08-21 RX ADMIN — METOPROLOL TARTRATE 25 MG: 25 TABLET, FILM COATED ORAL at 21:08

## 2023-08-21 RX ADMIN — FUROSEMIDE 40 MG: 40 TABLET ORAL at 21:09

## 2023-08-21 RX ADMIN — NYSTATIN: 100000 CREAM TOPICAL at 21:25

## 2023-08-21 RX ADMIN — IPRATROPIUM BROMIDE 0.5 MG: 0.5 SOLUTION RESPIRATORY (INHALATION) at 09:48

## 2023-08-21 RX ADMIN — METHOCARBAMOL TABLETS 750 MG: 500 TABLET, COATED ORAL at 21:09

## 2023-08-21 RX ADMIN — ENOXAPARIN SODIUM 40 MG: 100 INJECTION SUBCUTANEOUS at 08:50

## 2023-08-21 RX ADMIN — LORAZEPAM 2 MG: 2 INJECTION INTRAMUSCULAR; INTRAVENOUS at 18:59

## 2023-08-21 RX ADMIN — PREDNISONE 20 MG: 20 TABLET ORAL at 08:50

## 2023-08-21 RX ADMIN — ASPIRIN 81 MG: 81 TABLET, CHEWABLE ORAL at 08:50

## 2023-08-21 RX ADMIN — SENNOSIDES AND DOCUSATE SODIUM 2 TABLET: 50; 8.6 TABLET ORAL at 08:49

## 2023-08-21 RX ADMIN — PANTOPRAZOLE SODIUM 40 MG: 40 TABLET, DELAYED RELEASE ORAL at 08:50

## 2023-08-21 RX ADMIN — NYSTATIN: 100000 CREAM TOPICAL at 09:01

## 2023-08-21 RX ADMIN — POLYETHYLENE GLYCOL 3350 17 G: 17 POWDER, FOR SOLUTION ORAL at 08:51

## 2023-08-21 RX ADMIN — SODIUM CHLORIDE, PRESERVATIVE FREE 10 ML: 5 INJECTION INTRAVENOUS at 21:25

## 2023-08-21 RX ADMIN — ATORVASTATIN CALCIUM 10 MG: 10 TABLET, FILM COATED ORAL at 08:50

## 2023-08-21 RX ADMIN — SPIRONOLACTONE 25 MG: 25 TABLET ORAL at 08:49

## 2023-08-21 RX ADMIN — SENNOSIDES AND DOCUSATE SODIUM 2 TABLET: 50; 8.6 TABLET ORAL at 21:08

## 2023-08-21 RX ADMIN — ANASTROZOLE 1 MG: 1 TABLET, COATED ORAL at 18:03

## 2023-08-21 NOTE — ED NOTES
Richard Hinkle   TRANSFER - OUT REPORT:    Verbal report given to Richard Hinkle RN on Miguel Whitfield  being transferred to Oncology for routine progression of patient care       Report consisted of patient's Situation, Background, Assessment and   Recommendations(SBAR). Information from the following report(s) Nurse Handoff Report, Index, ED Encounter Summary, ED SBAR, Adult Overview, Surgery Report, Intake/Output, MAR, Recent Results, and Cardiac Rhythm sinus  was reviewed with the receiving nurse. [place] Fall Assessment:    Presents to emergency department  because of falls (Syncope, seizure, or loss of consciousness): No  Age > 70: No  Altered Mental Status, Intoxication with alcohol or substance confusion (Disorientation, impaired judgment, poor safety awaremess, or inability to follow instructions): No  Impaired Mobility: Ambulates or transfers with assistive devices or assistance; Unable to ambulate or transer.: Yes             Lines:   Peripheral IV 08/20/23 Left Antecubital (Active)   Site Assessment Clean, dry & intact 08/20/23 2338   Line Status Blood return noted 08/20/23 2338   Phlebitis Assessment No symptoms 08/20/23 2338   Infiltration Assessment 0 08/20/23 2338   Dressing Status Clean, dry & intact 08/20/23 2338   Dressing Type Transparent 08/20/23 2338   Dressing Intervention New 08/20/23 2338        Opportunity for questions and clarification was provided.       Patient transported with:  Lan Talley RN  08/21/23 2440

## 2023-08-21 NOTE — ED NOTES
Pt voided while laying in stretcher. Sheets changed and hygiene care provided at this time.       Raúl Dominguez RN  08/20/23 0607

## 2023-08-21 NOTE — ED PROVIDER NOTES
[facility] EMERGENCY DEPT  EMERGENCY DEPARTMENT ENCOUNTER       Pt Name: Efrain Tellez  MRN: [id number]  [address] [facility] 1970  Date of evaluation: 8/20/2023  Provider: Valeria Eng MD   PCP: Christopher Brambila MD  Note Started: 4:19 AM 8/21/23     CHIEF COMPLAINT       Chief Complaint   Patient presents with    Constipation     Per EMS, Pt has been constipated for 4+ days. Pt also complaining of leg pain and back pain. Pt has hx of lung cancer and wears 5 liters O2 at baseline. Pt reports taking Mirlax 1x/day for 2 days, does state she is passing gas. HISTORY OF PRESENT ILLNESS: 1 or more elements      History From: Patient, History limited by: None     Efrain Tellez is a 48 y.o. female who presents with multiple complaints. She reports she has had constipation over the past week and a half approximately. She has been taking MiraLAX without relief to her symptoms. She reports somewhat worsening of her dyspnea, is on oxygen at baseline for COPD. History of CHF CAD and recently diagnosed metastatic breast cancer. States she is not currently on chemotherapy or followed by oncologist for this diagnosis. Reports diffuse chronic pain as well. Nursing Notes were all reviewed and agreed with or any disagreements were addressed in the HPI. REVIEW OF SYSTEMS        Positives and Pertinent negatives as per HPI.     PAST HISTORY     Past Medical History:  Past Medical History:   Diagnosis Date    Arthritis     Asthma     Breast lump     CAD (coronary artery disease)     Cancer (HCC)     breast cancer    Congestive heart failure (HCC)     COPD (chronic obstructive pulmonary disease) ([address])     Diabetes ([address])     Hypertension     Morbid obesity with BMI of 70 and over, adult [facility])     NSTEMI (non-ST elevated myocardial infarction) ([address]) 8/18/2012    NSTEMI (non-ST elevated myocardial infarction) ([address])     SVT (supraventricular tachycardia) ([address])        Past Surgical History:  Past Surgical History:

## 2023-08-21 NOTE — H&P
nodule. Labs demonstrate: Unremarkable CBC, high sensitive troponin 29, proBNP 243, sodium 140, potassium 4.1, glucose 147, BUN 18, creatinine 0.91, calcium 10.9 (albumin 3.3), total bilirubin 0.4 ALT 47, AST 30, alkaline phosphatase 310. We were asked to admit for work up and evaluation of the above problems. Past Medical History:   Diagnosis Date    Arthritis     Asthma     Breast lump     CAD (coronary artery disease)     Cancer (HCC)     breast cancer    Congestive heart failure (HCC)     COPD (chronic obstructive pulmonary disease) (HCC)     Diabetes ([address])     Hypertension     Morbid obesity with BMI of 70 and over, adult ([address])     NSTEMI (non-ST elevated myocardial infarction) ([address]) 2012    NSTEMI (non-ST elevated myocardial infarction) ([address])     SVT (supraventricular tachycardia) ([address])         Past Surgical History:   Procedure Laterality Date     SECTION      ORTHOPEDIC SURGERY      OTHER SURGICAL HISTORY      cyst removed from back    [place] UNLISTED PROCEDURE CARDIAC SURGERY      Stents    US BREAST BIOPSY W LOC DEVICE 1ST LESION RIGHT Right 2018    US BREAST NEEDLE BIOPSY RIGHT 2018 [facility] RAD US       Social History     Tobacco Use    Smoking status: Former     Packs/day: 0.25     Types: Cigarettes    Smokeless tobacco: Never    Tobacco comments:     Quit smoking: Patient states \"I  aint smoking no more d*mn cigarettes after yesterday\" 2018   Substance Use Topics    Alcohol use: No        Family History   Problem Relation Age of Onset    Heart Disease Mother     Heart Disease Father     Heart Disease Sister     Breast Cancer Maternal Grandmother     Breast Cancer Paternal Grandmother      No Known Allergies     Prior to Admission medications    Medication Sig Start Date End Date Taking?  Authorizing Provider   predniSONE (DELTASONE) 20 MG tablet Take 1 tablet by mouth daily   Yes Historical Provider, MD   albuterol (PROVENTIL) (2.5 MG/3ML) 0.083% nebulizer solution Take 3 mLs

## 2023-08-22 LAB
GLUCOSE BLD STRIP.AUTO-MCNC: 123 MG/DL (ref 65–117)
GLUCOSE BLD STRIP.AUTO-MCNC: 146 MG/DL (ref 65–117)
GLUCOSE BLD STRIP.AUTO-MCNC: 176 MG/DL (ref 65–117)
GLUCOSE BLD STRIP.AUTO-MCNC: 183 MG/DL (ref 65–117)
SERVICE CMNT-IMP: ABNORMAL

## 2023-08-22 PROCEDURE — 6360000002 HC RX W HCPCS: Performed by: INTERNAL MEDICINE

## 2023-08-22 PROCEDURE — 6370000000 HC RX 637 (ALT 250 FOR IP): Performed by: INTERNAL MEDICINE

## 2023-08-22 PROCEDURE — 6360000002 HC RX W HCPCS: Performed by: STUDENT IN AN ORGANIZED HEALTH CARE EDUCATION/TRAINING PROGRAM

## 2023-08-22 PROCEDURE — 6370000000 HC RX 637 (ALT 250 FOR IP): Performed by: STUDENT IN AN ORGANIZED HEALTH CARE EDUCATION/TRAINING PROGRAM

## 2023-08-22 PROCEDURE — 94640 AIRWAY INHALATION TREATMENT: CPT

## 2023-08-22 PROCEDURE — 81025 URINE PREGNANCY TEST: CPT

## 2023-08-22 PROCEDURE — 2700000000 HC OXYGEN THERAPY PER DAY

## 2023-08-22 PROCEDURE — 2500000003 HC RX 250 WO HCPCS: Performed by: INTERNAL MEDICINE

## 2023-08-22 PROCEDURE — 99233 SBSQ HOSP IP/OBS HIGH 50: CPT | Performed by: INTERNAL MEDICINE

## 2023-08-22 PROCEDURE — 2580000003 HC RX 258: Performed by: STUDENT IN AN ORGANIZED HEALTH CARE EDUCATION/TRAINING PROGRAM

## 2023-08-22 PROCEDURE — 82962 GLUCOSE BLOOD TEST: CPT

## 2023-08-22 PROCEDURE — 94761 N-INVAS EAR/PLS OXIMETRY MLT: CPT

## 2023-08-22 PROCEDURE — 1100000003 HC PRIVATE W/ TELEMETRY

## 2023-08-22 RX ORDER — LIDOCAINE 4 G/G
1 PATCH TOPICAL
Status: DISCONTINUED | OUTPATIENT
Start: 2023-08-22 | End: 2023-08-29 | Stop reason: HOSPADM

## 2023-08-22 RX ORDER — BISACODYL 5 MG/1
10 TABLET, DELAYED RELEASE ORAL ONCE
Status: DISCONTINUED | OUTPATIENT
Start: 2023-08-22 | End: 2023-08-29 | Stop reason: HOSPADM

## 2023-08-22 RX ORDER — HYDROMORPHONE HYDROCHLORIDE 1 MG/ML
0.5 INJECTION, SOLUTION INTRAMUSCULAR; INTRAVENOUS; SUBCUTANEOUS EVERY 6 HOURS PRN
Status: DISCONTINUED | OUTPATIENT
Start: 2023-08-22 | End: 2023-08-29 | Stop reason: HOSPADM

## 2023-08-22 RX ORDER — SORBITOL SOLUTION 70 %
15 SOLUTION, ORAL MISCELLANEOUS ONCE
Status: COMPLETED | OUTPATIENT
Start: 2023-08-22 | End: 2023-08-22

## 2023-08-22 RX ORDER — MORPHINE SULFATE 10 MG/5ML
5 SOLUTION ORAL EVERY 4 HOURS PRN
Status: DISCONTINUED | OUTPATIENT
Start: 2023-08-22 | End: 2023-08-29 | Stop reason: HOSPADM

## 2023-08-22 RX ADMIN — METOPROLOL TARTRATE 25 MG: 25 TABLET, FILM COATED ORAL at 21:30

## 2023-08-22 RX ADMIN — FUROSEMIDE 40 MG: 40 TABLET ORAL at 09:16

## 2023-08-22 RX ADMIN — ACETAMINOPHEN 1300 MG: 325 TABLET ORAL at 21:29

## 2023-08-22 RX ADMIN — METHOCARBAMOL TABLETS 750 MG: 500 TABLET, COATED ORAL at 09:12

## 2023-08-22 RX ADMIN — NYSTATIN: 100000 CREAM TOPICAL at 11:31

## 2023-08-22 RX ADMIN — HYDROMORPHONE HYDROCHLORIDE 0.5 MG: 1 INJECTION, SOLUTION INTRAMUSCULAR; INTRAVENOUS; SUBCUTANEOUS at 05:16

## 2023-08-22 RX ADMIN — FUROSEMIDE 40 MG: 40 TABLET ORAL at 21:29

## 2023-08-22 RX ADMIN — ENOXAPARIN SODIUM 40 MG: 100 INJECTION SUBCUTANEOUS at 21:30

## 2023-08-22 RX ADMIN — IPRATROPIUM BROMIDE 0.5 MG: 0.5 SOLUTION RESPIRATORY (INHALATION) at 07:32

## 2023-08-22 RX ADMIN — ARFORMOTEROL TARTRATE: 15 SOLUTION RESPIRATORY (INHALATION) at 07:37

## 2023-08-22 RX ADMIN — NYSTATIN: 100000 CREAM TOPICAL at 21:32

## 2023-08-22 RX ADMIN — ACETAMINOPHEN 1300 MG: 325 TABLET ORAL at 11:30

## 2023-08-22 RX ADMIN — INSULIN GLARGINE 10 UNITS: 100 INJECTION, SOLUTION SUBCUTANEOUS at 21:29

## 2023-08-22 RX ADMIN — ATORVASTATIN CALCIUM 10 MG: 10 TABLET, FILM COATED ORAL at 09:16

## 2023-08-22 RX ADMIN — SODIUM CHLORIDE, PRESERVATIVE FREE 5 ML: 5 INJECTION INTRAVENOUS at 21:31

## 2023-08-22 RX ADMIN — PANTOPRAZOLE SODIUM 40 MG: 40 TABLET, DELAYED RELEASE ORAL at 06:07

## 2023-08-22 RX ADMIN — ANASTROZOLE 1 MG: 1 TABLET, COATED ORAL at 09:16

## 2023-08-22 RX ADMIN — SPIRONOLACTONE 25 MG: 25 TABLET ORAL at 09:16

## 2023-08-22 RX ADMIN — METHOCARBAMOL TABLETS 750 MG: 500 TABLET, COATED ORAL at 21:28

## 2023-08-22 RX ADMIN — SODIUM CHLORIDE, PRESERVATIVE FREE 10 ML: 5 INJECTION INTRAVENOUS at 09:19

## 2023-08-22 RX ADMIN — METHOCARBAMOL TABLETS 750 MG: 500 TABLET, COATED ORAL at 15:05

## 2023-08-22 RX ADMIN — IPRATROPIUM BROMIDE 0.5 MG: 0.5 SOLUTION RESPIRATORY (INHALATION) at 20:05

## 2023-08-22 RX ADMIN — SORBITOL SOLUTION (BULK) 15 ML: 70 SOLUTION at 11:32

## 2023-08-22 RX ADMIN — ARFORMOTEROL TARTRATE: 15 SOLUTION RESPIRATORY (INHALATION) at 20:05

## 2023-08-22 RX ADMIN — SENNOSIDES AND DOCUSATE SODIUM 2 TABLET: 50; 8.6 TABLET ORAL at 09:15

## 2023-08-22 RX ADMIN — POLYETHYLENE GLYCOL 3350 17 G: 17 POWDER, FOR SOLUTION ORAL at 09:16

## 2023-08-22 RX ADMIN — HYDROMORPHONE HYDROCHLORIDE 0.5 MG: 1 INJECTION, SOLUTION INTRAMUSCULAR; INTRAVENOUS; SUBCUTANEOUS at 18:32

## 2023-08-22 RX ADMIN — ENOXAPARIN SODIUM 40 MG: 100 INJECTION SUBCUTANEOUS at 09:16

## 2023-08-22 RX ADMIN — PREDNISONE 20 MG: 20 TABLET ORAL at 09:16

## 2023-08-22 ASSESSMENT — PAIN DESCRIPTION - ORIENTATION
ORIENTATION: RIGHT
ORIENTATION: RIGHT;LEFT
ORIENTATION: RIGHT
ORIENTATION: RIGHT

## 2023-08-22 ASSESSMENT — PAIN DESCRIPTION - LOCATION
LOCATION: BACK;LEG
LOCATION: LEG;BACK
LOCATION: LEG;BACK
LOCATION: BACK;LEG

## 2023-08-22 ASSESSMENT — PAIN DESCRIPTION - DESCRIPTORS
DESCRIPTORS: ACHING

## 2023-08-22 ASSESSMENT — PAIN SCALES - GENERAL
PAINLEVEL_OUTOF10: 6

## 2023-08-22 NOTE — PALLIATIVE CARE
[facility]) injection 4 mg  4 mg IntraVENous Q6H PRN    acetaminophen (TYLENOL) suppository 650 mg  650 mg Rectal Q6H PRN    insulin glargine (LANTUS) injection vial 10 Units  10 Units SubCUTAneous Nightly    insulin lispro (HUMALOG) injection vial 0-8 Units  0-8 Units SubCUTAneous TID WC    insulin lispro (HUMALOG) injection vial 0-4 Units  0-4 Units SubCUTAneous Nightly    glucose chewable tablet 16 g  4 tablet Oral PRN    dextrose bolus 10% 125 mL  125 mL IntraVENous PRN    Or    dextrose bolus 10% 250 mL  250 mL IntraVENous PRN    glucagon injection 1 mg  1 mg SubCUTAneous PRN    dextrose 10 % infusion   IntraVENous Continuous PRN    enoxaparin (LOVENOX) injection 40 mg  40 mg SubCUTAneous BID    polyethylene glycol (GLYCOLAX) packet 17 g  17 g Oral BID    acetaminophen (TYLENOL) tablet 1,300 mg  1,300 mg Oral BID    HYDROmorphone HCl PF (DILAUDID) injection 0.5 mg  0.5 mg IntraVENous Q4H PRN    methocarbamol (ROBAXIN) tablet 750 mg  750 mg Oral TID    arformoterol 15 mcg-budesonide 0.5 mg neb solution   Nebulization BID RT    And    ipratropium (ATROVENT) 0.02 % nebulizer solution 0.5 mg  0.5 mg Nebulization BID RT    sennosides-docusate sodium (SENOKOT-S) 8.6-50 MG tablet 2 tablet  2 tablet Oral BID    anastrozole (ARIMIDEX) tablet 1 mg  1 mg Oral Daily          LAB AND IMAGING FINDINGS:     Lab Results   Component Value Date/Time    WBC 9.9 08/21/2023 08:18 AM    HGB 12.6 08/21/2023 08:18 AM     08/21/2023 08:18 AM     Lab Results   Component Value Date/Time     08/21/2023 08:18 AM    K 4.4 08/21/2023 08:18 AM     08/21/2023 08:18 AM    CO2 36 08/21/2023 08:18 AM    BUN 17 08/21/2023 08:18 AM    MG 2.0 05/31/2023 03:16 AM    PHOS 2.2 04/10/2022 02:39 AM      Lab Results   Component Value Date/Time    GLOB 3.4 08/20/2023 10:03 PM     Lab Results   Component Value Date/Time    INR 1.0 02/19/2021 03:41 AM      No results found for: IRON, TIBC, IBCT, FERR   Lab Results   Component Value Date/Time

## 2023-08-22 NOTE — CARE COORDINATION
Care Management Initial Assessment       RUR:18%  Readmission? No  1st IM letter given? Yes - will need 2nd IM letter  1st  letter given: No      08/22/23 1421   Service Assessment   Patient Orientation Alert and Oriented   Cognition Alert   History Provided By Patient   Primary Caregiver Family  (states she has 2 nephews and son that help)   Support Systems Family Members   Patient's Healthcare Decision Maker is: Patient Declined (Legal Next of Kin Remains as Decision Maker)   PCP Verified by CM Yes   Prior Functional Level Bathing;Dressing;Housework; Shopping;Cooking;Mobility   Current Functional Level Bathing;Dressing;Cooking;Housework; Shopping;Mobility   Can patient return to prior living arrangement Yes   Family able to assist with home care needs: Yes   Would you like for me to discuss the discharge plan with any other family members/significant others, and if so, who? Yes  (can speak with son Macrina Duron or  Flora Painter at [phone] (sister in law))   Financial Resources [facility]   Community Resources None   Social/Functional History   Lives With Family   Type of [facility]   Active  No   Discharge Planning   Type of Residence House   Current Services Prior To Admission None  (states she is moving and does not want services set up until she moves)   Patient expects to be discharged to: [place]     CM met with patient to discuss discharge planning. Patient was asked about SNF. She does not wish to go. States she is planning to move and is needed at home. States her family can help her. Patient will need transportation arranged, when discharged. CM will continue to follow.     Best Dillard, RN  Care Manager

## 2023-08-23 LAB
GLUCOSE BLD STRIP.AUTO-MCNC: 135 MG/DL (ref 65–117)
GLUCOSE BLD STRIP.AUTO-MCNC: 159 MG/DL (ref 65–117)
GLUCOSE BLD STRIP.AUTO-MCNC: 206 MG/DL (ref 65–117)
GLUCOSE BLD STRIP.AUTO-MCNC: 216 MG/DL (ref 65–117)
HCG UR QL: NEGATIVE
SERVICE CMNT-IMP: ABNORMAL

## 2023-08-23 PROCEDURE — 2580000003 HC RX 258: Performed by: STUDENT IN AN ORGANIZED HEALTH CARE EDUCATION/TRAINING PROGRAM

## 2023-08-23 PROCEDURE — 2500000003 HC RX 250 WO HCPCS: Performed by: INTERNAL MEDICINE

## 2023-08-23 PROCEDURE — 1100000003 HC PRIVATE W/ TELEMETRY

## 2023-08-23 PROCEDURE — 6360000002 HC RX W HCPCS: Performed by: STUDENT IN AN ORGANIZED HEALTH CARE EDUCATION/TRAINING PROGRAM

## 2023-08-23 PROCEDURE — 2700000000 HC OXYGEN THERAPY PER DAY

## 2023-08-23 PROCEDURE — 6370000000 HC RX 637 (ALT 250 FOR IP): Performed by: INTERNAL MEDICINE

## 2023-08-23 PROCEDURE — 94640 AIRWAY INHALATION TREATMENT: CPT

## 2023-08-23 PROCEDURE — 6370000000 HC RX 637 (ALT 250 FOR IP): Performed by: STUDENT IN AN ORGANIZED HEALTH CARE EDUCATION/TRAINING PROGRAM

## 2023-08-23 PROCEDURE — 82962 GLUCOSE BLOOD TEST: CPT

## 2023-08-23 PROCEDURE — 6360000002 HC RX W HCPCS: Performed by: INTERNAL MEDICINE

## 2023-08-23 PROCEDURE — 94761 N-INVAS EAR/PLS OXIMETRY MLT: CPT

## 2023-08-23 PROCEDURE — 99232 SBSQ HOSP IP/OBS MODERATE 35: CPT | Performed by: INTERNAL MEDICINE

## 2023-08-23 RX ORDER — SENNA AND DOCUSATE SODIUM 50; 8.6 MG/1; MG/1
1 TABLET, FILM COATED ORAL
Status: DISCONTINUED | OUTPATIENT
Start: 2023-08-24 | End: 2023-08-29 | Stop reason: HOSPADM

## 2023-08-23 RX ORDER — POLYETHYLENE GLYCOL 3350 17 G/17G
17 POWDER, FOR SOLUTION ORAL DAILY
Status: DISCONTINUED | OUTPATIENT
Start: 2023-08-23 | End: 2023-08-29 | Stop reason: HOSPADM

## 2023-08-23 RX ORDER — GUAIFENESIN 200 MG/10ML
200 LIQUID ORAL EVERY 4 HOURS PRN
Status: DISCONTINUED | OUTPATIENT
Start: 2023-08-23 | End: 2023-08-29 | Stop reason: HOSPADM

## 2023-08-23 RX ADMIN — HYDROMORPHONE HYDROCHLORIDE 0.5 MG: 1 INJECTION, SOLUTION INTRAMUSCULAR; INTRAVENOUS; SUBCUTANEOUS at 21:39

## 2023-08-23 RX ADMIN — ACETAMINOPHEN 1300 MG: 325 TABLET ORAL at 21:29

## 2023-08-23 RX ADMIN — Medication 2 UNITS: at 17:37

## 2023-08-23 RX ADMIN — METHOCARBAMOL TABLETS 750 MG: 500 TABLET, COATED ORAL at 17:36

## 2023-08-23 RX ADMIN — ENOXAPARIN SODIUM 40 MG: 100 INJECTION SUBCUTANEOUS at 10:47

## 2023-08-23 RX ADMIN — NYSTATIN: 100000 CREAM TOPICAL at 12:34

## 2023-08-23 RX ADMIN — ARFORMOTEROL TARTRATE: 15 SOLUTION RESPIRATORY (INHALATION) at 07:57

## 2023-08-23 RX ADMIN — METOPROLOL TARTRATE 25 MG: 25 TABLET, FILM COATED ORAL at 21:29

## 2023-08-23 RX ADMIN — NYSTATIN: 100000 CREAM TOPICAL at 21:34

## 2023-08-23 RX ADMIN — ONDANSETRON 4 MG: 2 INJECTION INTRAMUSCULAR; INTRAVENOUS at 15:26

## 2023-08-23 RX ADMIN — SODIUM CHLORIDE, PRESERVATIVE FREE 5 ML: 5 INJECTION INTRAVENOUS at 21:33

## 2023-08-23 RX ADMIN — SODIUM CHLORIDE, PRESERVATIVE FREE 10 ML: 5 INJECTION INTRAVENOUS at 10:49

## 2023-08-23 RX ADMIN — FUROSEMIDE 40 MG: 40 TABLET ORAL at 15:27

## 2023-08-23 RX ADMIN — GUAIFENESIN 200 MG: 200 SOLUTION ORAL at 17:43

## 2023-08-23 RX ADMIN — ATORVASTATIN CALCIUM 10 MG: 10 TABLET, FILM COATED ORAL at 10:44

## 2023-08-23 RX ADMIN — ACETAMINOPHEN 1300 MG: 325 TABLET ORAL at 10:44

## 2023-08-23 RX ADMIN — IPRATROPIUM BROMIDE 0.5 MG: 0.5 SOLUTION RESPIRATORY (INHALATION) at 19:52

## 2023-08-23 RX ADMIN — ARFORMOTEROL TARTRATE: 15 SOLUTION RESPIRATORY (INHALATION) at 19:52

## 2023-08-23 RX ADMIN — INSULIN GLARGINE 10 UNITS: 100 INJECTION, SOLUTION SUBCUTANEOUS at 21:34

## 2023-08-23 RX ADMIN — HYDROMORPHONE HYDROCHLORIDE 0.5 MG: 1 INJECTION, SOLUTION INTRAMUSCULAR; INTRAVENOUS; SUBCUTANEOUS at 07:55

## 2023-08-23 RX ADMIN — POLYETHYLENE GLYCOL 3350 17 G: 17 POWDER, FOR SOLUTION ORAL at 15:28

## 2023-08-23 RX ADMIN — ASPIRIN 81 MG: 81 TABLET, CHEWABLE ORAL at 12:26

## 2023-08-23 RX ADMIN — METHOCARBAMOL TABLETS 750 MG: 500 TABLET, COATED ORAL at 10:43

## 2023-08-23 RX ADMIN — ANASTROZOLE 1 MG: 1 TABLET, COATED ORAL at 13:54

## 2023-08-23 RX ADMIN — PANTOPRAZOLE SODIUM 40 MG: 40 TABLET, DELAYED RELEASE ORAL at 10:44

## 2023-08-23 RX ADMIN — METHOCARBAMOL TABLETS 750 MG: 500 TABLET, COATED ORAL at 21:29

## 2023-08-23 RX ADMIN — ENOXAPARIN SODIUM 40 MG: 100 INJECTION SUBCUTANEOUS at 21:29

## 2023-08-23 RX ADMIN — IPRATROPIUM BROMIDE 0.5 MG: 0.5 SOLUTION RESPIRATORY (INHALATION) at 07:57

## 2023-08-23 RX ADMIN — PREDNISONE 20 MG: 20 TABLET ORAL at 10:44

## 2023-08-23 ASSESSMENT — PAIN SCALES - GENERAL
PAINLEVEL_OUTOF10: 7
PAINLEVEL_OUTOF10: 6
PAINLEVEL_OUTOF10: 0
PAINLEVEL_OUTOF10: 4
PAINLEVEL_OUTOF10: 6
PAINLEVEL_OUTOF10: 7

## 2023-08-23 ASSESSMENT — PAIN DESCRIPTION - DESCRIPTORS
DESCRIPTORS: ACHING
DESCRIPTORS: ACHING;SHOOTING
DESCRIPTORS: ACHING
DESCRIPTORS: ACHING

## 2023-08-23 ASSESSMENT — PAIN DESCRIPTION - PAIN TYPE: TYPE: CHRONIC PAIN

## 2023-08-23 ASSESSMENT — PAIN DESCRIPTION - FREQUENCY: FREQUENCY: CONTINUOUS

## 2023-08-23 ASSESSMENT — PAIN DESCRIPTION - ORIENTATION
ORIENTATION: LEFT;LOWER
ORIENTATION: LEFT
ORIENTATION: LOWER;LEFT
ORIENTATION: RIGHT;LEFT

## 2023-08-23 ASSESSMENT — PAIN DESCRIPTION - ONSET: ONSET: GRADUAL

## 2023-08-23 ASSESSMENT — PAIN DESCRIPTION - LOCATION
LOCATION: BACK;LEG
LOCATION: LEG;BACK
LOCATION: BACK;LEG
LOCATION: BACK;LEG

## 2023-08-23 ASSESSMENT — PULMONARY FUNCTION TESTS: PEFR_L/MIN: 97

## 2023-08-24 ENCOUNTER — APPOINTMENT (OUTPATIENT)
Facility: HOSPITAL | Age: 53
DRG: 543 | End: 2023-08-24
Payer: MEDICARE

## 2023-08-24 PROBLEM — R45.89 NEED FOR EMOTIONAL SUPPORT: Status: ACTIVE | Noted: 2023-08-24

## 2023-08-24 PROBLEM — E66.01 MORBID OBESITY (HCC): Status: ACTIVE | Noted: 2023-08-24

## 2023-08-24 LAB
GLUCOSE BLD STRIP.AUTO-MCNC: 150 MG/DL (ref 65–117)
GLUCOSE BLD STRIP.AUTO-MCNC: 235 MG/DL (ref 65–117)
GLUCOSE BLD STRIP.AUTO-MCNC: 386 MG/DL (ref 65–117)
GLUCOSE BLD STRIP.AUTO-MCNC: 94 MG/DL (ref 65–117)
SERVICE CMNT-IMP: ABNORMAL
SERVICE CMNT-IMP: NORMAL

## 2023-08-24 PROCEDURE — 94640 AIRWAY INHALATION TREATMENT: CPT

## 2023-08-24 PROCEDURE — 2500000003 HC RX 250 WO HCPCS: Performed by: INTERNAL MEDICINE

## 2023-08-24 PROCEDURE — 2700000000 HC OXYGEN THERAPY PER DAY

## 2023-08-24 PROCEDURE — 6370000000 HC RX 637 (ALT 250 FOR IP): Performed by: STUDENT IN AN ORGANIZED HEALTH CARE EDUCATION/TRAINING PROGRAM

## 2023-08-24 PROCEDURE — 1100000003 HC PRIVATE W/ TELEMETRY

## 2023-08-24 PROCEDURE — 6370000000 HC RX 637 (ALT 250 FOR IP): Performed by: INTERNAL MEDICINE

## 2023-08-24 PROCEDURE — 6360000002 HC RX W HCPCS: Performed by: STUDENT IN AN ORGANIZED HEALTH CARE EDUCATION/TRAINING PROGRAM

## 2023-08-24 PROCEDURE — 6360000002 HC RX W HCPCS: Performed by: INTERNAL MEDICINE

## 2023-08-24 PROCEDURE — 6360000004 HC RX CONTRAST MEDICATION: Performed by: INTERNAL MEDICINE

## 2023-08-24 PROCEDURE — 99232 SBSQ HOSP IP/OBS MODERATE 35: CPT | Performed by: FAMILY MEDICINE

## 2023-08-24 PROCEDURE — 82962 GLUCOSE BLOOD TEST: CPT

## 2023-08-24 PROCEDURE — 94761 N-INVAS EAR/PLS OXIMETRY MLT: CPT

## 2023-08-24 PROCEDURE — 73702 CT LWR EXTREMITY W/O&W/DYE: CPT

## 2023-08-24 PROCEDURE — 2580000003 HC RX 258: Performed by: STUDENT IN AN ORGANIZED HEALTH CARE EDUCATION/TRAINING PROGRAM

## 2023-08-24 RX ADMIN — GUAIFENESIN 200 MG: 200 SOLUTION ORAL at 12:08

## 2023-08-24 RX ADMIN — METOPROLOL TARTRATE 25 MG: 25 TABLET, FILM COATED ORAL at 08:35

## 2023-08-24 RX ADMIN — ACETAMINOPHEN 1300 MG: 325 TABLET ORAL at 08:40

## 2023-08-24 RX ADMIN — ANASTROZOLE 1 MG: 1 TABLET, COATED ORAL at 11:52

## 2023-08-24 RX ADMIN — ARFORMOTEROL TARTRATE: 15 SOLUTION RESPIRATORY (INHALATION) at 07:21

## 2023-08-24 RX ADMIN — ENOXAPARIN SODIUM 40 MG: 100 INJECTION SUBCUTANEOUS at 20:38

## 2023-08-24 RX ADMIN — GUAIFENESIN 200 MG: 200 SOLUTION ORAL at 23:37

## 2023-08-24 RX ADMIN — IPRATROPIUM BROMIDE 0.5 MG: 0.5 SOLUTION RESPIRATORY (INHALATION) at 07:16

## 2023-08-24 RX ADMIN — FUROSEMIDE 40 MG: 40 TABLET ORAL at 08:34

## 2023-08-24 RX ADMIN — MORPHINE SULFATE 5 MG: 10 SOLUTION ORAL at 08:34

## 2023-08-24 RX ADMIN — METOPROLOL TARTRATE 25 MG: 25 TABLET, FILM COATED ORAL at 20:41

## 2023-08-24 RX ADMIN — Medication 8 UNITS: at 18:45

## 2023-08-24 RX ADMIN — IOPAMIDOL 100 ML: 755 INJECTION, SOLUTION INTRAVENOUS at 15:11

## 2023-08-24 RX ADMIN — PREDNISONE 20 MG: 20 TABLET ORAL at 08:36

## 2023-08-24 RX ADMIN — ATORVASTATIN CALCIUM 10 MG: 10 TABLET, FILM COATED ORAL at 08:37

## 2023-08-24 RX ADMIN — METHOCARBAMOL TABLETS 750 MG: 500 TABLET, COATED ORAL at 11:51

## 2023-08-24 RX ADMIN — HYDROMORPHONE HYDROCHLORIDE 0.5 MG: 1 INJECTION, SOLUTION INTRAMUSCULAR; INTRAVENOUS; SUBCUTANEOUS at 20:32

## 2023-08-24 RX ADMIN — ARFORMOTEROL TARTRATE: 15 SOLUTION RESPIRATORY (INHALATION) at 19:59

## 2023-08-24 RX ADMIN — ACETAMINOPHEN 1300 MG: 325 TABLET ORAL at 23:28

## 2023-08-24 RX ADMIN — SPIRONOLACTONE 25 MG: 25 TABLET ORAL at 08:36

## 2023-08-24 RX ADMIN — NYSTATIN: 100000 CREAM TOPICAL at 08:42

## 2023-08-24 RX ADMIN — ENOXAPARIN SODIUM 40 MG: 100 INJECTION SUBCUTANEOUS at 08:37

## 2023-08-24 RX ADMIN — HYDROMORPHONE HYDROCHLORIDE 0.5 MG: 1 INJECTION, SOLUTION INTRAMUSCULAR; INTRAVENOUS; SUBCUTANEOUS at 14:24

## 2023-08-24 RX ADMIN — SODIUM CHLORIDE, PRESERVATIVE FREE 10 ML: 5 INJECTION INTRAVENOUS at 08:45

## 2023-08-24 RX ADMIN — Medication 2 UNITS: at 12:55

## 2023-08-24 RX ADMIN — IPRATROPIUM BROMIDE 0.5 MG: 0.5 SOLUTION RESPIRATORY (INHALATION) at 19:58

## 2023-08-24 RX ADMIN — INSULIN GLARGINE 10 UNITS: 100 INJECTION, SOLUTION SUBCUTANEOUS at 22:53

## 2023-08-24 RX ADMIN — SODIUM CHLORIDE, PRESERVATIVE FREE 10 ML: 5 INJECTION INTRAVENOUS at 20:46

## 2023-08-24 RX ADMIN — PANTOPRAZOLE SODIUM 40 MG: 40 TABLET, DELAYED RELEASE ORAL at 08:35

## 2023-08-24 RX ADMIN — ASPIRIN 81 MG: 81 TABLET, CHEWABLE ORAL at 08:35

## 2023-08-24 ASSESSMENT — PAIN DESCRIPTION - PAIN TYPE: TYPE: CHRONIC PAIN

## 2023-08-24 ASSESSMENT — PAIN SCALES - GENERAL
PAINLEVEL_OUTOF10: 7
PAINLEVEL_OUTOF10: 6
PAINLEVEL_OUTOF10: 6
PAINLEVEL_OUTOF10: 9
PAINLEVEL_OUTOF10: 4
PAINLEVEL_OUTOF10: 2
PAINLEVEL_OUTOF10: 3
PAINLEVEL_OUTOF10: 2

## 2023-08-24 ASSESSMENT — PAIN DESCRIPTION - DESCRIPTORS
DESCRIPTORS: ACHING;STABBING
DESCRIPTORS: ACHING;SHOOTING;SPASM
DESCRIPTORS: ACHING;SHOOTING
DESCRIPTORS: ACHING

## 2023-08-24 ASSESSMENT — PAIN DESCRIPTION - LOCATION
LOCATION: BACK;HIP
LOCATION: BACK;HIP;PELVIS
LOCATION: GENERALIZED;LEG
LOCATION: BACK;LEG

## 2023-08-24 ASSESSMENT — PAIN SCALES - WONG BAKER
WONGBAKER_NUMERICALRESPONSE: 2
WONGBAKER_NUMERICALRESPONSE: 2

## 2023-08-24 ASSESSMENT — PAIN - FUNCTIONAL ASSESSMENT
PAIN_FUNCTIONAL_ASSESSMENT: PREVENTS OR INTERFERES SOME ACTIVE ACTIVITIES AND ADLS
PAIN_FUNCTIONAL_ASSESSMENT: PREVENTS OR INTERFERES WITH MANY ACTIVE NOT PASSIVE ACTIVITIES

## 2023-08-24 ASSESSMENT — PAIN DESCRIPTION - FREQUENCY: FREQUENCY: CONTINUOUS

## 2023-08-24 ASSESSMENT — PAIN DESCRIPTION - ORIENTATION
ORIENTATION: RIGHT
ORIENTATION: RIGHT

## 2023-08-24 NOTE — CARE COORDINATION
Transition of Care Plan:    RUR: 18%  Prior Level of Functioning: Needs assistance   Disposition: TBD upon Medical Stability   DME needed: has access to DME   Transportation at discharge: will need transport home   IM/IMM Medicare/ letter given: 2nd IM Medicare Letter   Is patient a  and connected with VA? N/A   If yes, was [employer] transfer form completed and VA notified? N/A  Caregiver Contact: Everton Nuñez (child) [phone]  Discharge Caregiver contacted prior to discharge? Family to be contacted   Care Conference needed? Not at this time   Barriers to discharge: Medical Stable      CM: Jelena Acosta is currently working with pt in the [address] [place] [address]. Unit. CM staffed case with clinical team, and was informed that pt will remain as inpatient for 48hrs. CM informed that pt will be followed by Ortho and will need a CT. Pt will be followed by therapist to determine baseline. CM will continue to follow.     CJ Grove CM  [phone]

## 2023-08-25 PROBLEM — Z79.4 TYPE 2 DIABETES MELLITUS WITH HYPERGLYCEMIA, WITH LONG-TERM CURRENT USE OF INSULIN (HCC): Status: ACTIVE | Noted: 2023-08-25

## 2023-08-25 PROBLEM — E11.65 TYPE 2 DIABETES MELLITUS WITH HYPERGLYCEMIA, WITH LONG-TERM CURRENT USE OF INSULIN (HCC): Status: ACTIVE | Noted: 2023-08-25

## 2023-08-25 LAB
ANION GAP SERPL CALC-SCNC: 2 MMOL/L (ref 5–15)
BUN SERPL-MCNC: 22 MG/DL (ref 6–20)
BUN/CREAT SERPL: 27 (ref 12–20)
CALCIUM SERPL-MCNC: 11.1 MG/DL (ref 8.5–10.1)
CHLORIDE SERPL-SCNC: 100 MMOL/L (ref 97–108)
CO2 SERPL-SCNC: 34 MMOL/L (ref 21–32)
CREAT SERPL-MCNC: 0.83 MG/DL (ref 0.55–1.02)
GLUCOSE BLD STRIP.AUTO-MCNC: 187 MG/DL (ref 65–117)
GLUCOSE BLD STRIP.AUTO-MCNC: 222 MG/DL (ref 65–117)
GLUCOSE BLD STRIP.AUTO-MCNC: 245 MG/DL (ref 65–117)
GLUCOSE BLD STRIP.AUTO-MCNC: 341 MG/DL (ref 65–117)
GLUCOSE SERPL-MCNC: 120 MG/DL (ref 65–100)
MAGNESIUM SERPL-MCNC: 2.2 MG/DL (ref 1.6–2.4)
POTASSIUM SERPL-SCNC: 4.1 MMOL/L (ref 3.5–5.1)
SERVICE CMNT-IMP: ABNORMAL
SODIUM SERPL-SCNC: 136 MMOL/L (ref 136–145)

## 2023-08-25 PROCEDURE — 6370000000 HC RX 637 (ALT 250 FOR IP): Performed by: INTERNAL MEDICINE

## 2023-08-25 PROCEDURE — 94761 N-INVAS EAR/PLS OXIMETRY MLT: CPT

## 2023-08-25 PROCEDURE — 36415 COLL VENOUS BLD VENIPUNCTURE: CPT

## 2023-08-25 PROCEDURE — 6370000000 HC RX 637 (ALT 250 FOR IP): Performed by: STUDENT IN AN ORGANIZED HEALTH CARE EDUCATION/TRAINING PROGRAM

## 2023-08-25 PROCEDURE — 83735 ASSAY OF MAGNESIUM: CPT

## 2023-08-25 PROCEDURE — 2580000003 HC RX 258: Performed by: STUDENT IN AN ORGANIZED HEALTH CARE EDUCATION/TRAINING PROGRAM

## 2023-08-25 PROCEDURE — 1100000003 HC PRIVATE W/ TELEMETRY

## 2023-08-25 PROCEDURE — 6360000002 HC RX W HCPCS: Performed by: STUDENT IN AN ORGANIZED HEALTH CARE EDUCATION/TRAINING PROGRAM

## 2023-08-25 PROCEDURE — 94640 AIRWAY INHALATION TREATMENT: CPT

## 2023-08-25 PROCEDURE — 6370000000 HC RX 637 (ALT 250 FOR IP): Performed by: NURSE PRACTITIONER

## 2023-08-25 PROCEDURE — 2500000003 HC RX 250 WO HCPCS: Performed by: INTERNAL MEDICINE

## 2023-08-25 PROCEDURE — 80048 BASIC METABOLIC PNL TOTAL CA: CPT

## 2023-08-25 PROCEDURE — 6370000000 HC RX 637 (ALT 250 FOR IP)

## 2023-08-25 PROCEDURE — 2700000000 HC OXYGEN THERAPY PER DAY

## 2023-08-25 PROCEDURE — 99221 1ST HOSP IP/OBS SF/LOW 40: CPT

## 2023-08-25 PROCEDURE — 6360000002 HC RX W HCPCS: Performed by: INTERNAL MEDICINE

## 2023-08-25 PROCEDURE — 82962 GLUCOSE BLOOD TEST: CPT

## 2023-08-25 RX ORDER — HYDRALAZINE HYDROCHLORIDE 20 MG/ML
10 INJECTION INTRAMUSCULAR; INTRAVENOUS EVERY 6 HOURS PRN
Status: DISCONTINUED | OUTPATIENT
Start: 2023-08-25 | End: 2023-08-29 | Stop reason: HOSPADM

## 2023-08-25 RX ORDER — NYSTATIN 100000 [USP'U]/G
POWDER TOPICAL 2 TIMES DAILY
Status: DISCONTINUED | OUTPATIENT
Start: 2023-08-25 | End: 2023-08-29 | Stop reason: HOSPADM

## 2023-08-25 RX ORDER — INSULIN GLARGINE 100 [IU]/ML
12 INJECTION, SOLUTION SUBCUTANEOUS NIGHTLY
Status: DISCONTINUED | OUTPATIENT
Start: 2023-08-25 | End: 2023-08-29 | Stop reason: HOSPADM

## 2023-08-25 RX ORDER — IPRATROPIUM BROMIDE AND ALBUTEROL SULFATE 2.5; .5 MG/3ML; MG/3ML
1 SOLUTION RESPIRATORY (INHALATION)
Status: DISCONTINUED | OUTPATIENT
Start: 2023-08-25 | End: 2023-08-29 | Stop reason: HOSPADM

## 2023-08-25 RX ORDER — INSULIN LISPRO 100 [IU]/ML
4 INJECTION, SOLUTION INTRAVENOUS; SUBCUTANEOUS
Status: DISCONTINUED | OUTPATIENT
Start: 2023-08-25 | End: 2023-08-29 | Stop reason: HOSPADM

## 2023-08-25 RX ADMIN — SODIUM CHLORIDE, PRESERVATIVE FREE 10 ML: 5 INJECTION INTRAVENOUS at 22:05

## 2023-08-25 RX ADMIN — ACETAMINOPHEN 1300 MG: 325 TABLET ORAL at 09:03

## 2023-08-25 RX ADMIN — ATORVASTATIN CALCIUM 10 MG: 10 TABLET, FILM COATED ORAL at 09:04

## 2023-08-25 RX ADMIN — Medication 2 UNITS: at 11:21

## 2023-08-25 RX ADMIN — HYDROMORPHONE HYDROCHLORIDE 0.5 MG: 1 INJECTION, SOLUTION INTRAMUSCULAR; INTRAVENOUS; SUBCUTANEOUS at 18:44

## 2023-08-25 RX ADMIN — Medication 2 UNITS: at 18:20

## 2023-08-25 RX ADMIN — METHOCARBAMOL TABLETS 750 MG: 500 TABLET, COATED ORAL at 04:23

## 2023-08-25 RX ADMIN — Medication 4 UNITS: at 18:21

## 2023-08-25 RX ADMIN — NYSTATIN: 100000 POWDER TOPICAL at 22:00

## 2023-08-25 RX ADMIN — METOPROLOL TARTRATE 25 MG: 25 TABLET, FILM COATED ORAL at 09:02

## 2023-08-25 RX ADMIN — ACETAMINOPHEN 1300 MG: 325 TABLET ORAL at 23:55

## 2023-08-25 RX ADMIN — ARFORMOTEROL TARTRATE: 15 SOLUTION RESPIRATORY (INHALATION) at 20:57

## 2023-08-25 RX ADMIN — Medication 4 UNITS: at 21:56

## 2023-08-25 RX ADMIN — NYSTATIN: 100000 CREAM TOPICAL at 22:10

## 2023-08-25 RX ADMIN — ARFORMOTEROL TARTRATE: 15 SOLUTION RESPIRATORY (INHALATION) at 08:19

## 2023-08-25 RX ADMIN — METOPROLOL TARTRATE 25 MG: 25 TABLET, FILM COATED ORAL at 21:55

## 2023-08-25 RX ADMIN — IPRATROPIUM BROMIDE AND ALBUTEROL SULFATE 1 DOSE: .5; 3 SOLUTION RESPIRATORY (INHALATION) at 14:50

## 2023-08-25 RX ADMIN — IPRATROPIUM BROMIDE AND ALBUTEROL SULFATE 1 DOSE: .5; 3 SOLUTION RESPIRATORY (INHALATION) at 20:52

## 2023-08-25 RX ADMIN — NYSTATIN: 100000 CREAM TOPICAL at 10:13

## 2023-08-25 RX ADMIN — METHOCARBAMOL TABLETS 750 MG: 500 TABLET, COATED ORAL at 23:57

## 2023-08-25 RX ADMIN — PANTOPRAZOLE SODIUM 40 MG: 40 TABLET, DELAYED RELEASE ORAL at 06:44

## 2023-08-25 RX ADMIN — FUROSEMIDE 40 MG: 40 TABLET ORAL at 09:03

## 2023-08-25 RX ADMIN — IPRATROPIUM BROMIDE 0.5 MG: 0.5 SOLUTION RESPIRATORY (INHALATION) at 08:14

## 2023-08-25 RX ADMIN — ASPIRIN 81 MG: 81 TABLET, CHEWABLE ORAL at 09:04

## 2023-08-25 RX ADMIN — INSULIN GLARGINE 12 UNITS: 100 INJECTION, SOLUTION SUBCUTANEOUS at 21:56

## 2023-08-25 RX ADMIN — SENNOSIDES AND DOCUSATE SODIUM 1 TABLET: 50; 8.6 TABLET ORAL at 21:55

## 2023-08-25 RX ADMIN — MORPHINE SULFATE 5 MG: 10 SOLUTION ORAL at 06:55

## 2023-08-25 RX ADMIN — SODIUM CHLORIDE, PRESERVATIVE FREE 10 ML: 5 INJECTION INTRAVENOUS at 10:19

## 2023-08-25 RX ADMIN — PREDNISONE 20 MG: 20 TABLET ORAL at 09:02

## 2023-08-25 RX ADMIN — MORPHINE SULFATE 5 MG: 10 SOLUTION ORAL at 14:10

## 2023-08-25 RX ADMIN — ENOXAPARIN SODIUM 40 MG: 100 INJECTION SUBCUTANEOUS at 09:04

## 2023-08-25 RX ADMIN — SPIRONOLACTONE 25 MG: 25 TABLET ORAL at 09:01

## 2023-08-25 RX ADMIN — ANASTROZOLE 1 MG: 1 TABLET, COATED ORAL at 10:08

## 2023-08-25 RX ADMIN — METHOCARBAMOL TABLETS 750 MG: 500 TABLET, COATED ORAL at 14:10

## 2023-08-25 RX ADMIN — ENOXAPARIN SODIUM 40 MG: 100 INJECTION SUBCUTANEOUS at 21:55

## 2023-08-25 RX ADMIN — METHOCARBAMOL TABLETS 750 MG: 500 TABLET, COATED ORAL at 09:39

## 2023-08-25 RX ADMIN — FUROSEMIDE 40 MG: 40 TABLET ORAL at 21:53

## 2023-08-25 ASSESSMENT — PAIN DESCRIPTION - LOCATION
LOCATION: BACK;LEG
LOCATION: BACK;HIP;LEG
LOCATION: HIP;BACK;LEG
LOCATION: BACK;HIP
LOCATION: BACK;LEG
LOCATION: HIP;BACK;LEG
LOCATION: BACK

## 2023-08-25 ASSESSMENT — PAIN DESCRIPTION - ORIENTATION
ORIENTATION: RIGHT;POSTERIOR
ORIENTATION: LEFT;POSTERIOR;LOWER;RIGHT
ORIENTATION: LEFT;RIGHT;LOWER
ORIENTATION: RIGHT;LEFT;LOWER;POSTERIOR
ORIENTATION: RIGHT;LEFT;POSTERIOR
ORIENTATION: RIGHT;POSTERIOR
ORIENTATION: LEFT;RIGHT;LOWER
ORIENTATION: LEFT;RIGHT;LOWER

## 2023-08-25 ASSESSMENT — PAIN DESCRIPTION - DESCRIPTORS
DESCRIPTORS: SHARP;THROBBING;SHOOTING
DESCRIPTORS: ACHING;SHARP
DESCRIPTORS: ACHING
DESCRIPTORS: THROBBING;SHARP
DESCRIPTORS: SHARP;THROBBING
DESCRIPTORS: SHARP;THROBBING
DESCRIPTORS: ACHING
DESCRIPTORS: ACHING;THROBBING;STABBING;SHOOTING;SHARP

## 2023-08-25 ASSESSMENT — PAIN DESCRIPTION - ONSET: ONSET: ON-GOING

## 2023-08-25 ASSESSMENT — PAIN SCALES - GENERAL
PAINLEVEL_OUTOF10: 7
PAINLEVEL_OUTOF10: 7
PAINLEVEL_OUTOF10: 6
PAINLEVEL_OUTOF10: 7
PAINLEVEL_OUTOF10: 1
PAINLEVEL_OUTOF10: 7
PAINLEVEL_OUTOF10: 6
PAINLEVEL_OUTOF10: 6
PAINLEVEL_OUTOF10: 5
PAINLEVEL_OUTOF10: 6

## 2023-08-25 ASSESSMENT — PAIN - FUNCTIONAL ASSESSMENT
PAIN_FUNCTIONAL_ASSESSMENT: PREVENTS OR INTERFERES SOME ACTIVE ACTIVITIES AND ADLS
PAIN_FUNCTIONAL_ASSESSMENT: PREVENTS OR INTERFERES SOME ACTIVE ACTIVITIES AND ADLS

## 2023-08-25 ASSESSMENT — PAIN DESCRIPTION - PAIN TYPE: TYPE: CHRONIC PAIN

## 2023-08-25 ASSESSMENT — PAIN DESCRIPTION - FREQUENCY: FREQUENCY: CONTINUOUS

## 2023-08-25 NOTE — CARE COORDINATION
Transition of Care Plan:    RUR: 18%  Prior Level of Functioning: Needs assistance   Disposition: TBD upon Medical Stability   DME needed: has access to DME   Transportation at discharge: will need transport home   IM/IMM Medicare/ letter given: 2nd IM Medicare Letter   Is patient a  and connected with VA? N/A   If yes, was [employer] transfer form completed and VA notified? N/A  Caregiver Contact: Genoveva Manning (child) [phone]  Discharge Caregiver contacted prior to discharge? Family to be contacted   Care Conference needed? Not at this time   Barriers to discharge: Medical Stable    CM informed that pt currently does not have a current d/c plans and will remain as inpatient greater than 48hrs. Ortho is currently following. Pt is receiving education for diabetes management. CM will continue to follow.     CJ Chaves   [phone]

## 2023-08-25 NOTE — RT PROTOCOL NOTE
ADULT PROTOCOL: JET AEROSOL ASSESSMENT    Patient  Falk Card     48 y.o.   female     8/25/2023  8:18 AM    Breath Sounds Pre Procedure: Breath Sounds Pre-Tx NITO: Diminished                                  Breath Sounds Pre-Tx LLL: Diminished        Breath Sounds Pre-Tx RUL: Diminished        Breath Sounds Pre-Tx RML: Diminished        Breath Sounds Pre-Tx RLL: Diminished  Breath Sounds Post Procedure: Breath Sounds Post-Tx NITO: Diminished          Breath Sounds Post-Tx LLL: Diminished          Breath Sounds Post-Tx RUL: Diminished          Breath Sounds Post-Tx RML: Diminished          Breath Sounds Post-Tx RLL: Diminished                                       Heart Rate: Pre procedure Pre-Tx Pulse: 82           Post procedure Post-Tx Pulse: 85    Resp Rate: Pre procedure Pre-Tx Resps: 18           Post procedure Post-Tx Resps: 18      Oxygen: O2 Therapy: Oxygen   nasal cannula     Changed: No    SpO2:  SpO2: 98 %   with Oxygen                Nebulizer Therapy: Current medications Medications: Arformoterol, Budesonide      Changed: Yes    Comments: Pt takes Duoneb at home QID. Added to her tx schedule at this time.      Problem List:   Patient Active Problem List   Diagnosis    Diastolic CHF, chronic (HCC)    Acute respiratory failure with hypoxia (HCC)    Cellulitis    HTN (hypertension)    Acute on chronic respiratory failure with hypercapnia (HCC)    Malignant essential hypertension    SIRS due to infectious process with acute organ dysfunction ([address])    CAP (community acquired pneumonia)    Other fatigue    Chest pressure    Coronary atherosclerosis of native coronary artery    Acute on chronic respiratory failure with hypoxia (HCC)    GERD (gastroesophageal reflux disease)    Dyspnea    Acute chest pain    Cough with hemoptysis    COPD (chronic obstructive pulmonary disease) ([address])    Abdominal pain    Obesity hypoventilation syndrome (HCC)    Shortness of breath    NSVT (nonsustained ventricular

## 2023-08-25 NOTE — CONSULTS
[facility] at [address] [place] [address], [facility] Rose Ann  W: [phone] F: [phone]    Hematology/ Oncology Consult Note      Reason for consult:     Anh Ching is a 48 y.o. female who we have been asked to see by Dr. Fernandez Daily for breast cancer. Subjective:     Anh Ching is a 51-year-old female patient admitted to [facility] with metastatic breast cancer with extensive osseous metastatic disease, COPD with chronic hypoxemic respiratory failure on 5 L of nasal cannula at baseline and super morbid obesity. Past medical history includes coronary artery disease, CHF, hypertension, hyperlipidemia, diabetes, severe obesity, obesity hypoventilation syndrome, restrictive lung disease due to super morbid obesity, cellulitis, GERD, medical noncompliance, gangrenous toe with maggot i infestation, asthma and hypoxia. Prior to seeing patient at the bedside spoke with Dr. Jim Li with [facility] [place], she reports that patient was fired from practice on June 8, 2023 after several no-show appointments. Per chart review it appears that patient has attempted to establish care with [address] [place] [address] oncology, however this has been unsuccessful due to transportation issues. [facility] next appointment with [facility] is in early September. Patient seen at bedside, she was lying on her left side in bed. She is super morbidly obese and reports that she must have transport via stretcher to any doctors appointments. She also stated that she must have a recliner present in the office for her to sit in as she is unable to lie on the stretcher for long periods of time and is unable to tolerate sitting in anything except for a recliner. She reports this complicate her ability to follow-up with [facility].   Although she was assured they would accommodate her structure in the clinic and allow her to sit in the recliner
Orthopedic Consult Note:    48 yr old female with history of stage 4 breast cancer with metastasis. Patient admitted 8/20/23. Our services were consulted today to evaluate her right femur lesions noted on CT scan. Patient admits to pain in her right hip and right knee. She states she has not ambulated well on her own since September. She admits to worsening pain when ambulating. She denies any areas of pain beyond her right hip, knee and thigh. Patient is attempting to establish with [facility] oncology and previously saw [facility] based on chart review. \"Prior to seeing patient at the bedside spoke with Dr. Ernesto Pierson with [facility], she reports that patient was fired from practice on June 8, 2023 after several no-show appointments. Per chart review it appears that patient has attempted to establish care with [facility] oncology, however this has been unsuccessful due to transportation issues. Her next appointment with [facility] is in early September. \" Saravanan DuPage, LUIS ANGEL- NP 8/21/23  Exam: Patient resting comfortably in bed. Nasal cannula in place. Patient able to perform some AROM of her right leg with associated hip pain. Patient can perform minimal leg raise while in bed. Sensation intact RLE. Strength 5/5 distal RLE. Plan:   - CT reviewed with Dr Marshall Cespedes  - Patient with multiple bony METs of the femur and acetabulum. - Patient will need follow up with orthopedic oncology in addition to establishing with oncology and attending appointments. - Patient should remain strict TTWB RLE for now to prevent any fracture or injury given her lesions until she is able to see ortho oncology.  - No plans for surgery at our facility. Follow up with orthopedic oncology outpatient likely at [facility]    Dr Marshall Cespedes aware and agrees.    LINDA Burgos
16 g  4 tablet Oral PRN    dextrose bolus 10% 125 mL  125 mL IntraVENous PRN    Or    dextrose bolus 10% 250 mL  250 mL IntraVENous PRN    glucagon injection 1 mg  1 mg SubCUTAneous PRN    dextrose 10 % infusion   IntraVENous Continuous PRN    enoxaparin (LOVENOX) injection 40 mg  40 mg SubCUTAneous BID    polyethylene glycol (GLYCOLAX) packet 17 g  17 g Oral BID    acetaminophen (TYLENOL) tablet 1,300 mg  1,300 mg Oral BID    HYDROmorphone HCl PF (DILAUDID) injection 0.5 mg  0.5 mg IntraVENous Q4H PRN    methocarbamol (ROBAXIN) tablet 750 mg  750 mg Oral TID    arformoterol 15 mcg-budesonide 0.5 mg neb solution   Nebulization BID RT    And    ipratropium (ATROVENT) 0.02 % nebulizer solution 0.5 mg  0.5 mg Nebulization BID RT    LORazepam (ATIVAN) injection 2 mg  2 mg IntraVENous Once    morphine (MSIR) tablet 15 mg  15 mg Oral Q4H PRN    sennosides-docusate sodium (SENOKOT-S) 8.6-50 MG tablet 2 tablet  2 tablet Oral BID          LAB AND IMAGING FINDINGS:     Lab Results   Component Value Date/Time    WBC 9.9 08/21/2023 08:18 AM    HGB 12.6 08/21/2023 08:18 AM     08/21/2023 08:18 AM     Lab Results   Component Value Date/Time     08/21/2023 08:18 AM    K 4.4 08/21/2023 08:18 AM     08/21/2023 08:18 AM    CO2 36 08/21/2023 08:18 AM    BUN 17 08/21/2023 08:18 AM    MG 2.0 05/31/2023 03:16 AM    PHOS 2.2 04/10/2022 02:39 AM      Lab Results   Component Value Date/Time    GLOB 3.4 08/20/2023 10:03 PM     Lab Results   Component Value Date/Time    INR 1.0 02/19/2021 03:41 AM      No results found for: IRON, TIBC, IBCT, FERR   Lab Results   Component Value Date/Time    PH 7.29 02/18/2021 10:29 PM    PCO2 66 02/18/2021 10:29 PM    PO2 78 02/18/2021 10:29 PM     No components found for: [place]   Lab Results   Component Value Date/Time    CKMB 1.5 08/17/2020 12:45 AM    TROPONINI <0.05 02/19/2021 03:41 AM    TROPONINI <0.04 12/06/2019 05:56 PM              Only check if applicable and billing time based
DO        Or    ondansetron (ZOFRAN) injection 4 mg  4 mg IntraVENous Q6H PRN Alferd Ifeoma, DO        acetaminophen (TYLENOL) suppository 650 mg  650 mg Rectal Q6H PRN Alferd Ifeoma, DO        insulin glargine (LANTUS) injection vial 10 Units  10 Units SubCUTAneous Nightly Alferd Ifeoma, DO   10 Units at 08/21/23 2109    insulin lispro (HUMALOG) injection vial 0-8 Units  0-8 Units SubCUTAneous TID WC Alferd Ifeoma, DO        insulin lispro (HUMALOG) injection vial 0-4 Units  0-4 Units SubCUTAneous Nightly Alferd Ifeoma, DO        glucose chewable tablet 16 g  4 tablet Oral PRN Alferd Ifeoma, DO        dextrose bolus 10% 125 mL  125 mL IntraVENous PRN Alferd Ifeoma, DO        Or    dextrose bolus 10% 250 mL  250 mL IntraVENous PRN Alferd Ifeoma, DO        glucagon injection 1 mg  1 mg SubCUTAneous PRN Alferd Ifeoma, DO        dextrose 10 % infusion   IntraVENous Continuous PRN Alferd Ifeoma, DO        enoxaparin (LOVENOX) injection 40 mg  40 mg SubCUTAneous BID Alferd Ifeoma, DO   40 mg at 08/22/23 0916    polyethylene glycol (GLYCOLAX) packet 17 g  17 g Oral BID Alferd Ifeoma, DO   17 g at 08/22/23 8189    acetaminophen (TYLENOL) tablet 1,300 mg  1,300 mg Oral BID Jaciel Crockett MD   1,300 mg at 08/22/23 1130    methocarbamol (ROBAXIN) tablet 750 mg  750 mg Oral TID Jaciel Crockett MD   750 mg at 08/22/23 0912    arformoterol 15 mcg-budesonide 0.5 mg neb solution   Nebulization BID RT Jaciel Crockett MD   Given at 08/22/23 0853    And    ipratropium (ATROVENT) 0.02 % nebulizer solution 0.5 mg  0.5 mg Nebulization BID RT Jaciel Crockett MD   0.5 mg at 08/22/23 0732    sennosides-docusate sodium (SENOKOT-S) 8.6-50 MG tablet 2 tablet  2 tablet Oral BID Charli Billingsley MD   2 tablet at 08/22/23 0915    anastrozole (ARIMIDEX) tablet 1 mg  1 mg Oral Daily Sergo Cooper MD   1 mg at 08/22/23 9141     Review of Systems:  Pertinent items

## 2023-08-26 ENCOUNTER — APPOINTMENT (OUTPATIENT)
Facility: HOSPITAL | Age: 53
DRG: 543 | End: 2023-08-26
Payer: MEDICARE

## 2023-08-26 LAB
ANION GAP SERPL CALC-SCNC: 4 MMOL/L (ref 5–15)
BASOPHILS # BLD: 0.1 K/UL (ref 0–0.1)
BASOPHILS NFR BLD: 1 % (ref 0–1)
BUN SERPL-MCNC: 22 MG/DL (ref 6–20)
BUN/CREAT SERPL: 25 (ref 12–20)
CALCIUM SERPL-MCNC: 11.3 MG/DL (ref 8.5–10.1)
CHLORIDE SERPL-SCNC: 100 MMOL/L (ref 97–108)
CO2 SERPL-SCNC: 32 MMOL/L (ref 21–32)
CREAT SERPL-MCNC: 0.89 MG/DL (ref 0.55–1.02)
DIFFERENTIAL METHOD BLD: ABNORMAL
EOSINOPHIL # BLD: 0.1 K/UL (ref 0–0.4)
EOSINOPHIL NFR BLD: 1 % (ref 0–7)
ERYTHROCYTE [DISTWIDTH] IN BLOOD BY AUTOMATED COUNT: 12.5 % (ref 11.5–14.5)
GLUCOSE BLD STRIP.AUTO-MCNC: 108 MG/DL (ref 65–117)
GLUCOSE BLD STRIP.AUTO-MCNC: 162 MG/DL (ref 65–117)
GLUCOSE BLD STRIP.AUTO-MCNC: 204 MG/DL (ref 65–117)
GLUCOSE BLD STRIP.AUTO-MCNC: 216 MG/DL (ref 65–117)
GLUCOSE SERPL-MCNC: 109 MG/DL (ref 65–100)
HCT VFR BLD AUTO: 42.9 % (ref 35–47)
HGB BLD-MCNC: 13.5 G/DL (ref 11.5–16)
IMM GRANULOCYTES # BLD AUTO: 0.2 K/UL (ref 0–0.04)
IMM GRANULOCYTES NFR BLD AUTO: 2 % (ref 0–0.5)
LYMPHOCYTES # BLD: 1.5 K/UL (ref 0.8–3.5)
LYMPHOCYTES NFR BLD: 12 % (ref 12–49)
MAGNESIUM SERPL-MCNC: 2.2 MG/DL (ref 1.6–2.4)
MCH RBC QN AUTO: 30.5 PG (ref 26–34)
MCHC RBC AUTO-ENTMCNC: 31.5 G/DL (ref 30–36.5)
MCV RBC AUTO: 96.8 FL (ref 80–99)
MONOCYTES # BLD: 1.3 K/UL (ref 0–1)
MONOCYTES NFR BLD: 11 % (ref 5–13)
NEUTS SEG # BLD: 9.5 K/UL (ref 1.8–8)
NEUTS SEG NFR BLD: 73 % (ref 32–75)
NRBC # BLD: 0.02 K/UL (ref 0–0.01)
NRBC BLD-RTO: 0.2 PER 100 WBC
PLATELET # BLD AUTO: 261 K/UL (ref 150–400)
PMV BLD AUTO: 10.6 FL (ref 8.9–12.9)
POTASSIUM SERPL-SCNC: 4.5 MMOL/L (ref 3.5–5.1)
RBC # BLD AUTO: 4.43 M/UL (ref 3.8–5.2)
SERVICE CMNT-IMP: ABNORMAL
SERVICE CMNT-IMP: NORMAL
SODIUM SERPL-SCNC: 136 MMOL/L (ref 136–145)
WBC # BLD AUTO: 12.7 K/UL (ref 3.6–11)

## 2023-08-26 PROCEDURE — 6370000000 HC RX 637 (ALT 250 FOR IP)

## 2023-08-26 PROCEDURE — 94761 N-INVAS EAR/PLS OXIMETRY MLT: CPT

## 2023-08-26 PROCEDURE — 6370000000 HC RX 637 (ALT 250 FOR IP): Performed by: STUDENT IN AN ORGANIZED HEALTH CARE EDUCATION/TRAINING PROGRAM

## 2023-08-26 PROCEDURE — 6360000002 HC RX W HCPCS: Performed by: STUDENT IN AN ORGANIZED HEALTH CARE EDUCATION/TRAINING PROGRAM

## 2023-08-26 PROCEDURE — 2500000003 HC RX 250 WO HCPCS: Performed by: INTERNAL MEDICINE

## 2023-08-26 PROCEDURE — 83735 ASSAY OF MAGNESIUM: CPT

## 2023-08-26 PROCEDURE — 6370000000 HC RX 637 (ALT 250 FOR IP): Performed by: INTERNAL MEDICINE

## 2023-08-26 PROCEDURE — 82962 GLUCOSE BLOOD TEST: CPT

## 2023-08-26 PROCEDURE — 36415 COLL VENOUS BLD VENIPUNCTURE: CPT

## 2023-08-26 PROCEDURE — 2700000000 HC OXYGEN THERAPY PER DAY

## 2023-08-26 PROCEDURE — 6360000002 HC RX W HCPCS: Performed by: INTERNAL MEDICINE

## 2023-08-26 PROCEDURE — 94640 AIRWAY INHALATION TREATMENT: CPT

## 2023-08-26 PROCEDURE — 2580000003 HC RX 258: Performed by: STUDENT IN AN ORGANIZED HEALTH CARE EDUCATION/TRAINING PROGRAM

## 2023-08-26 PROCEDURE — 1100000003 HC PRIVATE W/ TELEMETRY

## 2023-08-26 PROCEDURE — 80048 BASIC METABOLIC PNL TOTAL CA: CPT

## 2023-08-26 PROCEDURE — 71045 X-RAY EXAM CHEST 1 VIEW: CPT

## 2023-08-26 PROCEDURE — 6360000002 HC RX W HCPCS: Performed by: NURSE PRACTITIONER

## 2023-08-26 PROCEDURE — 85025 COMPLETE CBC W/AUTO DIFF WBC: CPT

## 2023-08-26 RX ORDER — LACTULOSE 10 G/15ML
20 SOLUTION ORAL ONCE
Status: DISCONTINUED | OUTPATIENT
Start: 2023-08-26 | End: 2023-08-29 | Stop reason: HOSPADM

## 2023-08-26 RX ORDER — CALCITONIN SALMON 200 [USP'U]/ML
400 INJECTION, SOLUTION INTRAMUSCULAR; SUBCUTANEOUS ONCE
Status: COMPLETED | OUTPATIENT
Start: 2023-08-26 | End: 2023-08-26

## 2023-08-26 RX ADMIN — Medication 2 UNITS: at 12:14

## 2023-08-26 RX ADMIN — IPRATROPIUM BROMIDE AND ALBUTEROL SULFATE 1 DOSE: .5; 3 SOLUTION RESPIRATORY (INHALATION) at 16:59

## 2023-08-26 RX ADMIN — ACETAMINOPHEN 1300 MG: 325 TABLET ORAL at 08:41

## 2023-08-26 RX ADMIN — METOPROLOL TARTRATE 25 MG: 25 TABLET, FILM COATED ORAL at 08:42

## 2023-08-26 RX ADMIN — ARFORMOTEROL TARTRATE: 15 SOLUTION RESPIRATORY (INHALATION) at 08:37

## 2023-08-26 RX ADMIN — HYDROMORPHONE HYDROCHLORIDE 0.5 MG: 1 INJECTION, SOLUTION INTRAMUSCULAR; INTRAVENOUS; SUBCUTANEOUS at 17:49

## 2023-08-26 RX ADMIN — Medication 4 UNITS: at 08:41

## 2023-08-26 RX ADMIN — HYDROMORPHONE HYDROCHLORIDE 0.5 MG: 1 INJECTION, SOLUTION INTRAMUSCULAR; INTRAVENOUS; SUBCUTANEOUS at 09:02

## 2023-08-26 RX ADMIN — INSULIN GLARGINE 12 UNITS: 100 INJECTION, SOLUTION SUBCUTANEOUS at 21:28

## 2023-08-26 RX ADMIN — METOPROLOL TARTRATE 25 MG: 25 TABLET, FILM COATED ORAL at 21:27

## 2023-08-26 RX ADMIN — Medication 4 UNITS: at 17:57

## 2023-08-26 RX ADMIN — SENNOSIDES AND DOCUSATE SODIUM 1 TABLET: 50; 8.6 TABLET ORAL at 21:26

## 2023-08-26 RX ADMIN — NYSTATIN: 100000 POWDER TOPICAL at 21:35

## 2023-08-26 RX ADMIN — IPRATROPIUM BROMIDE AND ALBUTEROL SULFATE 1 DOSE: .5; 3 SOLUTION RESPIRATORY (INHALATION) at 08:32

## 2023-08-26 RX ADMIN — SODIUM CHLORIDE, PRESERVATIVE FREE 10 ML: 5 INJECTION INTRAVENOUS at 21:36

## 2023-08-26 RX ADMIN — NYSTATIN: 100000 POWDER TOPICAL at 08:45

## 2023-08-26 RX ADMIN — NYSTATIN: 100000 CREAM TOPICAL at 21:35

## 2023-08-26 RX ADMIN — ASPIRIN 81 MG: 81 TABLET, CHEWABLE ORAL at 08:42

## 2023-08-26 RX ADMIN — SODIUM CHLORIDE, PRESERVATIVE FREE 10 ML: 5 INJECTION INTRAVENOUS at 08:43

## 2023-08-26 RX ADMIN — SPIRONOLACTONE 25 MG: 25 TABLET ORAL at 08:43

## 2023-08-26 RX ADMIN — ENOXAPARIN SODIUM 40 MG: 100 INJECTION SUBCUTANEOUS at 08:43

## 2023-08-26 RX ADMIN — NYSTATIN: 100000 CREAM TOPICAL at 08:45

## 2023-08-26 RX ADMIN — ANASTROZOLE 1 MG: 1 TABLET, COATED ORAL at 08:43

## 2023-08-26 RX ADMIN — FUROSEMIDE 40 MG: 40 TABLET ORAL at 21:23

## 2023-08-26 RX ADMIN — IPRATROPIUM BROMIDE AND ALBUTEROL SULFATE 1 DOSE: .5; 3 SOLUTION RESPIRATORY (INHALATION) at 12:36

## 2023-08-26 RX ADMIN — METHOCARBAMOL TABLETS 750 MG: 500 TABLET, COATED ORAL at 13:22

## 2023-08-26 RX ADMIN — ENOXAPARIN SODIUM 40 MG: 100 INJECTION SUBCUTANEOUS at 21:27

## 2023-08-26 RX ADMIN — METHOCARBAMOL TABLETS 750 MG: 500 TABLET, COATED ORAL at 21:23

## 2023-08-26 RX ADMIN — POLYETHYLENE GLYCOL 3350 17 G: 17 POWDER, FOR SOLUTION ORAL at 08:42

## 2023-08-26 RX ADMIN — ACETAMINOPHEN 1300 MG: 325 TABLET ORAL at 21:25

## 2023-08-26 RX ADMIN — FUROSEMIDE 40 MG: 40 TABLET ORAL at 08:42

## 2023-08-26 RX ADMIN — ATORVASTATIN CALCIUM 10 MG: 10 TABLET, FILM COATED ORAL at 08:43

## 2023-08-26 RX ADMIN — Medication 2 UNITS: at 17:57

## 2023-08-26 RX ADMIN — PREDNISONE 20 MG: 20 TABLET ORAL at 08:43

## 2023-08-26 RX ADMIN — Medication 4 UNITS: at 12:14

## 2023-08-26 RX ADMIN — METHOCARBAMOL TABLETS 750 MG: 500 TABLET, COATED ORAL at 08:42

## 2023-08-26 RX ADMIN — CALCITONIN SALMON 400 UNITS: 200 INJECTION, SOLUTION INTRAMUSCULAR; SUBCUTANEOUS at 13:22

## 2023-08-26 RX ADMIN — PANTOPRAZOLE SODIUM 40 MG: 40 TABLET, DELAYED RELEASE ORAL at 06:43

## 2023-08-26 ASSESSMENT — PAIN DESCRIPTION - ORIENTATION
ORIENTATION: RIGHT;LEFT;POSTERIOR
ORIENTATION: RIGHT

## 2023-08-26 ASSESSMENT — PAIN SCALES - GENERAL
PAINLEVEL_OUTOF10: 5
PAINLEVEL_OUTOF10: 4
PAINLEVEL_OUTOF10: 6
PAINLEVEL_OUTOF10: 8

## 2023-08-26 ASSESSMENT — PAIN DESCRIPTION - LOCATION
LOCATION: HIP;BACK;LEG
LOCATION: BACK
LOCATION: BACK

## 2023-08-26 ASSESSMENT — PAIN DESCRIPTION - DESCRIPTORS
DESCRIPTORS: ACHING
DESCRIPTORS: ACHING

## 2023-08-26 ASSESSMENT — PAIN - FUNCTIONAL ASSESSMENT: PAIN_FUNCTIONAL_ASSESSMENT: PREVENTS OR INTERFERES SOME ACTIVE ACTIVITIES AND ADLS

## 2023-08-26 ASSESSMENT — PAIN DESCRIPTION - PAIN TYPE: TYPE: CHRONIC PAIN

## 2023-08-27 ENCOUNTER — APPOINTMENT (OUTPATIENT)
Facility: HOSPITAL | Age: 53
DRG: 543 | End: 2023-08-27
Payer: MEDICARE

## 2023-08-27 LAB
ANION GAP SERPL CALC-SCNC: 4 MMOL/L (ref 5–15)
APPEARANCE UR: CLEAR
BACTERIA URNS QL MICRO: NEGATIVE /HPF
BASOPHILS # BLD: 0.1 K/UL (ref 0–0.1)
BASOPHILS NFR BLD: 1 % (ref 0–1)
BILIRUB UR QL: NEGATIVE
BUN SERPL-MCNC: 19 MG/DL (ref 6–20)
BUN/CREAT SERPL: 23 (ref 12–20)
CALCIUM SERPL-MCNC: 10.6 MG/DL (ref 8.5–10.1)
CHLORIDE SERPL-SCNC: 100 MMOL/L (ref 97–108)
CO2 SERPL-SCNC: 31 MMOL/L (ref 21–32)
COLOR UR: ABNORMAL
CREAT SERPL-MCNC: 0.81 MG/DL (ref 0.55–1.02)
DIFFERENTIAL METHOD BLD: ABNORMAL
ECHO BSA: 2.77 M2
EOSINOPHIL # BLD: 0.1 K/UL (ref 0–0.4)
EOSINOPHIL NFR BLD: 1 % (ref 0–7)
EPITH CASTS URNS QL MICRO: ABNORMAL /LPF
ERYTHROCYTE [DISTWIDTH] IN BLOOD BY AUTOMATED COUNT: 12.5 % (ref 11.5–14.5)
GLUCOSE BLD STRIP.AUTO-MCNC: 158 MG/DL (ref 65–117)
GLUCOSE BLD STRIP.AUTO-MCNC: 175 MG/DL (ref 65–117)
GLUCOSE BLD STRIP.AUTO-MCNC: 211 MG/DL (ref 65–117)
GLUCOSE BLD STRIP.AUTO-MCNC: 220 MG/DL (ref 65–117)
GLUCOSE SERPL-MCNC: 126 MG/DL (ref 65–100)
GLUCOSE UR STRIP.AUTO-MCNC: NEGATIVE MG/DL
HCT VFR BLD AUTO: 44.1 % (ref 35–47)
HGB BLD-MCNC: 13.9 G/DL (ref 11.5–16)
HGB UR QL STRIP: ABNORMAL
HYALINE CASTS URNS QL MICRO: ABNORMAL /LPF (ref 0–2)
IMM GRANULOCYTES # BLD AUTO: 0.2 K/UL (ref 0–0.04)
IMM GRANULOCYTES NFR BLD AUTO: 2 % (ref 0–0.5)
KETONES UR QL STRIP.AUTO: NEGATIVE MG/DL
LEUKOCYTE ESTERASE UR QL STRIP.AUTO: NEGATIVE
LYMPHOCYTES # BLD: 1.1 K/UL (ref 0.8–3.5)
LYMPHOCYTES NFR BLD: 11 % (ref 12–49)
MAGNESIUM SERPL-MCNC: 2.1 MG/DL (ref 1.6–2.4)
MCH RBC QN AUTO: 30.2 PG (ref 26–34)
MCHC RBC AUTO-ENTMCNC: 31.5 G/DL (ref 30–36.5)
MCV RBC AUTO: 95.9 FL (ref 80–99)
MONOCYTES # BLD: 1 K/UL (ref 0–1)
MONOCYTES NFR BLD: 10 % (ref 5–13)
NEUTS SEG # BLD: 7.9 K/UL (ref 1.8–8)
NEUTS SEG NFR BLD: 75 % (ref 32–75)
NITRITE UR QL STRIP.AUTO: NEGATIVE
NRBC # BLD: 0 K/UL (ref 0–0.01)
NRBC BLD-RTO: 0 PER 100 WBC
PH UR STRIP: 6.5 (ref 5–8)
PLATELET # BLD AUTO: 236 K/UL (ref 150–400)
PMV BLD AUTO: 10.8 FL (ref 8.9–12.9)
POTASSIUM SERPL-SCNC: 3.9 MMOL/L (ref 3.5–5.1)
PROT UR STRIP-MCNC: NEGATIVE MG/DL
RBC # BLD AUTO: 4.6 M/UL (ref 3.8–5.2)
RBC #/AREA URNS HPF: ABNORMAL /HPF (ref 0–5)
SERVICE CMNT-IMP: ABNORMAL
SODIUM SERPL-SCNC: 135 MMOL/L (ref 136–145)
SP GR UR REFRACTOMETRY: 1.02
URINE CULTURE IF INDICATED: ABNORMAL
UROBILINOGEN UR QL STRIP.AUTO: 1 EU/DL (ref 0.2–1)
WBC # BLD AUTO: 10.4 K/UL (ref 3.6–11)
WBC URNS QL MICRO: ABNORMAL /HPF (ref 0–4)

## 2023-08-27 PROCEDURE — 6370000000 HC RX 637 (ALT 250 FOR IP): Performed by: NURSE PRACTITIONER

## 2023-08-27 PROCEDURE — 80048 BASIC METABOLIC PNL TOTAL CA: CPT

## 2023-08-27 PROCEDURE — 6370000000 HC RX 637 (ALT 250 FOR IP)

## 2023-08-27 PROCEDURE — 85025 COMPLETE CBC W/AUTO DIFF WBC: CPT

## 2023-08-27 PROCEDURE — 82962 GLUCOSE BLOOD TEST: CPT

## 2023-08-27 PROCEDURE — 93970 EXTREMITY STUDY: CPT

## 2023-08-27 PROCEDURE — 81001 URINALYSIS AUTO W/SCOPE: CPT

## 2023-08-27 PROCEDURE — 2500000003 HC RX 250 WO HCPCS: Performed by: INTERNAL MEDICINE

## 2023-08-27 PROCEDURE — 6370000000 HC RX 637 (ALT 250 FOR IP): Performed by: STUDENT IN AN ORGANIZED HEALTH CARE EDUCATION/TRAINING PROGRAM

## 2023-08-27 PROCEDURE — 1100000003 HC PRIVATE W/ TELEMETRY

## 2023-08-27 PROCEDURE — 6370000000 HC RX 637 (ALT 250 FOR IP): Performed by: INTERNAL MEDICINE

## 2023-08-27 PROCEDURE — 94640 AIRWAY INHALATION TREATMENT: CPT

## 2023-08-27 PROCEDURE — 2700000000 HC OXYGEN THERAPY PER DAY

## 2023-08-27 PROCEDURE — 83735 ASSAY OF MAGNESIUM: CPT

## 2023-08-27 PROCEDURE — 2580000003 HC RX 258: Performed by: STUDENT IN AN ORGANIZED HEALTH CARE EDUCATION/TRAINING PROGRAM

## 2023-08-27 PROCEDURE — 6360000002 HC RX W HCPCS: Performed by: INTERNAL MEDICINE

## 2023-08-27 PROCEDURE — 36415 COLL VENOUS BLD VENIPUNCTURE: CPT

## 2023-08-27 PROCEDURE — 94761 N-INVAS EAR/PLS OXIMETRY MLT: CPT

## 2023-08-27 PROCEDURE — 6360000002 HC RX W HCPCS: Performed by: STUDENT IN AN ORGANIZED HEALTH CARE EDUCATION/TRAINING PROGRAM

## 2023-08-27 RX ORDER — GUAIFENESIN 600 MG/1
600 TABLET, EXTENDED RELEASE ORAL 2 TIMES DAILY
Status: DISCONTINUED | OUTPATIENT
Start: 2023-08-27 | End: 2023-08-29 | Stop reason: HOSPADM

## 2023-08-27 RX ADMIN — Medication 4 UNITS: at 11:55

## 2023-08-27 RX ADMIN — ASPIRIN 81 MG: 81 TABLET, CHEWABLE ORAL at 08:20

## 2023-08-27 RX ADMIN — NYSTATIN: 100000 POWDER TOPICAL at 08:21

## 2023-08-27 RX ADMIN — GUAIFENESIN 600 MG: 600 TABLET, EXTENDED RELEASE ORAL at 15:12

## 2023-08-27 RX ADMIN — FUROSEMIDE 40 MG: 40 TABLET ORAL at 08:20

## 2023-08-27 RX ADMIN — Medication 4 UNITS: at 16:44

## 2023-08-27 RX ADMIN — IPRATROPIUM BROMIDE AND ALBUTEROL SULFATE 1 DOSE: .5; 3 SOLUTION RESPIRATORY (INHALATION) at 11:56

## 2023-08-27 RX ADMIN — Medication 2 UNITS: at 11:55

## 2023-08-27 RX ADMIN — FUROSEMIDE 20 MG: 40 TABLET ORAL at 20:46

## 2023-08-27 RX ADMIN — ACETAMINOPHEN 1300 MG: 325 TABLET ORAL at 08:19

## 2023-08-27 RX ADMIN — SODIUM CHLORIDE, PRESERVATIVE FREE 10 ML: 5 INJECTION INTRAVENOUS at 08:30

## 2023-08-27 RX ADMIN — METOPROLOL TARTRATE 25 MG: 25 TABLET, FILM COATED ORAL at 20:45

## 2023-08-27 RX ADMIN — IPRATROPIUM BROMIDE AND ALBUTEROL SULFATE 1 DOSE: .5; 3 SOLUTION RESPIRATORY (INHALATION) at 20:48

## 2023-08-27 RX ADMIN — ENOXAPARIN SODIUM 40 MG: 100 INJECTION SUBCUTANEOUS at 08:19

## 2023-08-27 RX ADMIN — METHOCARBAMOL TABLETS 750 MG: 500 TABLET, COATED ORAL at 20:40

## 2023-08-27 RX ADMIN — HYDROMORPHONE HYDROCHLORIDE 0.5 MG: 1 INJECTION, SOLUTION INTRAMUSCULAR; INTRAVENOUS; SUBCUTANEOUS at 20:56

## 2023-08-27 RX ADMIN — ARFORMOTEROL TARTRATE: 15 SOLUTION RESPIRATORY (INHALATION) at 20:47

## 2023-08-27 RX ADMIN — INSULIN GLARGINE 12 UNITS: 100 INJECTION, SOLUTION SUBCUTANEOUS at 20:39

## 2023-08-27 RX ADMIN — PREDNISONE 20 MG: 20 TABLET ORAL at 08:20

## 2023-08-27 RX ADMIN — SPIRONOLACTONE 25 MG: 25 TABLET ORAL at 08:20

## 2023-08-27 RX ADMIN — ACETAMINOPHEN 1300 MG: 325 TABLET ORAL at 20:43

## 2023-08-27 RX ADMIN — NYSTATIN: 100000 CREAM TOPICAL at 08:21

## 2023-08-27 RX ADMIN — METHOCARBAMOL TABLETS 750 MG: 500 TABLET, COATED ORAL at 08:19

## 2023-08-27 RX ADMIN — Medication 4 UNITS: at 08:20

## 2023-08-27 RX ADMIN — ATORVASTATIN CALCIUM 10 MG: 10 TABLET, FILM COATED ORAL at 08:20

## 2023-08-27 RX ADMIN — Medication 2 UNITS: at 16:45

## 2023-08-27 RX ADMIN — HYDROMORPHONE HYDROCHLORIDE 0.5 MG: 1 INJECTION, SOLUTION INTRAMUSCULAR; INTRAVENOUS; SUBCUTANEOUS at 05:43

## 2023-08-27 RX ADMIN — IPRATROPIUM BROMIDE AND ALBUTEROL SULFATE 1 DOSE: .5; 3 SOLUTION RESPIRATORY (INHALATION) at 16:13

## 2023-08-27 RX ADMIN — MORPHINE SULFATE 5 MG: 10 SOLUTION ORAL at 11:20

## 2023-08-27 RX ADMIN — ENOXAPARIN SODIUM 40 MG: 100 INJECTION SUBCUTANEOUS at 21:55

## 2023-08-27 RX ADMIN — METOPROLOL TARTRATE 25 MG: 25 TABLET, FILM COATED ORAL at 08:20

## 2023-08-27 RX ADMIN — METHOCARBAMOL TABLETS 750 MG: 500 TABLET, COATED ORAL at 13:38

## 2023-08-27 RX ADMIN — IPRATROPIUM BROMIDE AND ALBUTEROL SULFATE 1 DOSE: .5; 3 SOLUTION RESPIRATORY (INHALATION) at 08:55

## 2023-08-27 RX ADMIN — SENNOSIDES AND DOCUSATE SODIUM 1 TABLET: 50; 8.6 TABLET ORAL at 21:56

## 2023-08-27 RX ADMIN — NYSTATIN: 100000 CREAM TOPICAL at 21:56

## 2023-08-27 RX ADMIN — ANASTROZOLE 1 MG: 1 TABLET, COATED ORAL at 08:30

## 2023-08-27 RX ADMIN — NYSTATIN: 100000 POWDER TOPICAL at 21:57

## 2023-08-27 RX ADMIN — ARFORMOTEROL TARTRATE: 15 SOLUTION RESPIRATORY (INHALATION) at 09:01

## 2023-08-27 RX ADMIN — SODIUM CHLORIDE, PRESERVATIVE FREE 10 ML: 5 INJECTION INTRAVENOUS at 21:57

## 2023-08-27 RX ADMIN — PANTOPRAZOLE SODIUM 40 MG: 40 TABLET, DELAYED RELEASE ORAL at 08:19

## 2023-08-27 ASSESSMENT — PAIN SCALES - GENERAL
PAINLEVEL_OUTOF10: 8
PAINLEVEL_OUTOF10: 8
PAINLEVEL_OUTOF10: 6
PAINLEVEL_OUTOF10: 2
PAINLEVEL_OUTOF10: 8
PAINLEVEL_OUTOF10: 7
PAINLEVEL_OUTOF10: 4
PAINLEVEL_OUTOF10: 8

## 2023-08-27 ASSESSMENT — PAIN SCALES - WONG BAKER
WONGBAKER_NUMERICALRESPONSE: 4
WONGBAKER_NUMERICALRESPONSE: 2
WONGBAKER_NUMERICALRESPONSE: 4

## 2023-08-27 ASSESSMENT — PAIN DESCRIPTION - LOCATION
LOCATION: BACK

## 2023-08-27 ASSESSMENT — PAIN DESCRIPTION - ORIENTATION
ORIENTATION: LOWER

## 2023-08-27 ASSESSMENT — PAIN DESCRIPTION - DESCRIPTORS
DESCRIPTORS: ACHING
DESCRIPTORS: ACHING

## 2023-08-27 ASSESSMENT — PAIN DESCRIPTION - PAIN TYPE
TYPE: CHRONIC PAIN
TYPE: CHRONIC PAIN

## 2023-08-27 ASSESSMENT — PAIN DESCRIPTION - ONSET
ONSET: ON-GOING
ONSET: ON-GOING

## 2023-08-27 ASSESSMENT — PAIN - FUNCTIONAL ASSESSMENT: PAIN_FUNCTIONAL_ASSESSMENT: PREVENTS OR INTERFERES SOME ACTIVE ACTIVITIES AND ADLS

## 2023-08-27 NOTE — RT PROTOCOL NOTE
ADULT PROTOCOL: JET AEROSOL ASSESSMENT    Patient  Ling Harmon     48 y.o.   female     8/27/2023  11:57 AM    Breath Sounds Pre Procedure: Breath Sounds Pre-Tx NITO: Diminished                                  Breath Sounds Pre-Tx LLL: Diminished        Breath Sounds Pre-Tx RUL: Diminished        Breath Sounds Pre-Tx RML: Diminished        Breath Sounds Pre-Tx RLL: Diminished  Breath Sounds Post Procedure: Breath Sounds Post-Tx NITO: Diminished          Breath Sounds Post-Tx LLL: Diminished          Breath Sounds Post-Tx RUL: Diminished          Breath Sounds Post-Tx RML: Diminished          Breath Sounds Post-Tx RLL: Diminished                                       Heart Rate: Pre procedure Pre-Tx Pulse: 73           Post procedure Post-Tx Pulse: 86    Resp Rate: Pre procedure Pre-Tx Resps: 18           Post procedure Post-Tx Resps: 19      Oxygen: O2 Therapy: Oxygen   nasal cannula     Changed: No    SpO2:  SpO2: 92 %   with Oxygen                Nebulizer Therapy: Current medications Medications: Albuterol/Ipratropium      Changed: No    Comments: Home regiment    Problem List:   Patient Active Problem List   Diagnosis    Diastolic CHF, chronic (HCC)    Acute respiratory failure with hypoxia (HCC)    Cellulitis    HTN (hypertension)    Acute on chronic respiratory failure with hypercapnia (HCC)    Malignant essential hypertension    SIRS due to infectious process with acute organ dysfunction (HCC)    CAP (community acquired pneumonia)    Other fatigue    Chest pressure    Coronary atherosclerosis of native coronary artery    Acute on chronic respiratory failure with hypoxia (HCC)    GERD (gastroesophageal reflux disease)    Dyspnea    Acute chest pain    Cough with hemoptysis    COPD (chronic obstructive pulmonary disease) (HCC)    Abdominal pain    Obesity hypoventilation syndrome (HCC)    Shortness of breath    NSVT (nonsustained ventricular tachycardia) (HCC)    Acute pancreatitis    Acute respiratory

## 2023-08-27 NOTE — PLAN OF CARE
Problem: Pain  Goal: Verbalizes/displays adequate comfort level or baseline comfort level  Outcome: Progressing     Problem: Chronic Conditions and Co-morbidities  Goal: Patient's chronic conditions and co-morbidity symptoms are monitored and maintained or improved  Outcome: Progressing     Problem: Skin/Tissue Integrity  Goal: Absence of new skin breakdown  Description: 1. Monitor for areas of redness and/or skin breakdown  2. Assess vascular access sites hourly  3. Every 4-6 hours minimum:  Change oxygen saturation probe site  4. Every 4-6 hours:  If on nasal continuous positive airway pressure, respiratory therapy assess nares and determine need for appliance change or resting period.   Outcome: Progressing     Problem: Safety - Adult  Goal: Free from fall injury  Outcome: Progressing     Problem: Respiratory - Adult  Goal: Achieves optimal ventilation and oxygenation  8/23/2023 1111 by Bette Goddard RN  Outcome: Progressing  8/23/2023 1001 by Ankur Hart RCP  Outcome: Progressing
Problem: Pain  Goal: Verbalizes/displays adequate comfort level or baseline comfort level  Outcome: Progressing     Problem: Chronic Conditions and Co-morbidities  Goal: Patient's chronic conditions and co-morbidity symptoms are monitored and maintained or improved  Outcome: Progressing     Problem: Skin/Tissue Integrity  Goal: Absence of new skin breakdown  Description: 1. Monitor for areas of redness and/or skin breakdown  2. Assess vascular access sites hourly  3. Every 4-6 hours minimum:  Change oxygen saturation probe site  4. Every 4-6 hours:  If on nasal continuous positive airway pressure, respiratory therapy assess nares and determine need for appliance change or resting period.   Outcome: Progressing     Problem: Safety - Adult  Goal: Free from fall injury  Outcome: Progressing     Problem: Respiratory - Adult  Goal: Achieves optimal ventilation and oxygenation  8/27/2023 1032 by Ashley Morataya RN  Outcome: Progressing  8/27/2023 0858 by Kota Isaac RT  Outcome: Progressing
Problem: Pain  Goal: Verbalizes/displays adequate comfort level or baseline comfort level  Outcome: Progressing     Problem: Chronic Conditions and Co-morbidities  Goal: Patient's chronic conditions and co-morbidity symptoms are monitored and maintained or improved  Outcome: Progressing     Problem: Skin/Tissue Integrity  Goal: Absence of new skin breakdown  Description: 1. Monitor for areas of redness and/or skin breakdown  2. Assess vascular access sites hourly  3. Every 4-6 hours minimum:  Change oxygen saturation probe site  4. Every 4-6 hours:  If on nasal continuous positive airway pressure, respiratory therapy assess nares and determine need for appliance change or resting period. Outcome: Progressing     Problem: Safety - Adult  Goal: Free from fall injury  Outcome: Progressing     Problem: Respiratory - Adult  Goal: Achieves optimal ventilation and oxygenation  8/26/2023 1402 by Cora Sullivan RN  Outcome: Progressing  8/26/2023 0835 by RT Inocencia  Outcome: Progressing     Problem: Pain  Goal: Verbalizes/displays adequate comfort level or baseline comfort level  Outcome: Progressing     Problem: Chronic Conditions and Co-morbidities  Goal: Patient's chronic conditions and co-morbidity symptoms are monitored and maintained or improved  Outcome: Progressing     Problem: Skin/Tissue Integrity  Goal: Absence of new skin breakdown  Description: 1. Monitor for areas of redness and/or skin breakdown  2. Assess vascular access sites hourly  3. Every 4-6 hours minimum:  Change oxygen saturation probe site  4. Every 4-6 hours:  If on nasal continuous positive airway pressure, respiratory therapy assess nares and determine need for appliance change or resting period.   Outcome: Progressing     Problem: Safety - Adult  Goal: Free from fall injury  Outcome: Progressing     Problem: Respiratory - Adult  Goal: Achieves optimal ventilation and oxygenation  8/26/2023 1402 by Cora Sullivan RN  Outcome:
Problem: Pain  Goal: Verbalizes/displays adequate comfort level or baseline comfort level  Outcome: Progressing     Problem: Chronic Conditions and Co-morbidities  Goal: Patient's chronic conditions and co-morbidity symptoms are monitored and maintained or improved  Outcome: Progressing  Flowsheets (Taken 8/22/2023 0008 by Moon Espino RN)  Care Plan - Patient's Chronic Conditions and Co-Morbidity Symptoms are Monitored and Maintained or Improved: Monitor and assess patient's chronic conditions and comorbid symptoms for stability, deterioration, or improvement     Problem: Skin/Tissue Integrity  Goal: Absence of new skin breakdown  Description: 1. Monitor for areas of redness and/or skin breakdown  2. Assess vascular access sites hourly  3. Every 4-6 hours minimum:  Change oxygen saturation probe site  4. Every 4-6 hours:  If on nasal continuous positive airway pressure, respiratory therapy assess nares and determine need for appliance change or resting period.   Outcome: Progressing     Problem: Safety - Adult  Goal: Free from fall injury  Outcome: Progressing     Problem: Respiratory - Adult  Goal: Achieves optimal ventilation and oxygenation  Outcome: Progressing  Flowsheets (Taken 8/22/2023 0008 by Moon Espino RN)  Achieves optimal ventilation and oxygenation: Assess for changes in respiratory status
Problem: Pain  Goal: Verbalizes/displays adequate comfort level or baseline comfort level  Outcome: Progressing  Flowsheets (Taken 8/21/2023 0719)  Verbalizes/displays adequate comfort level or baseline comfort level:   Encourage patient to monitor pain and request assistance   Assess pain using appropriate pain scale     Problem: Chronic Conditions and Co-morbidities  Goal: Patient's chronic conditions and co-morbidity symptoms are monitored and maintained or improved  Outcome: Progressing  Flowsheets (Taken 8/21/2023 0719)  Care Plan - Patient's Chronic Conditions and Co-Morbidity Symptoms are Monitored and Maintained or Improved: Monitor and assess patient's chronic conditions and comorbid symptoms for stability, deterioration, or improvement     Problem: Skin/Tissue Integrity  Goal: Absence of new skin breakdown  Description: 1. Monitor for areas of redness and/or skin breakdown  2. Assess vascular access sites hourly  3. Every 4-6 hours minimum:  Change oxygen saturation probe site  4. Every 4-6 hours:  If on nasal continuous positive airway pressure, respiratory therapy assess nares and determine need for appliance change or resting period.   Outcome: Progressing     Problem: Safety - Adult  Goal: Free from fall injury  Outcome: Progressing     Problem: Respiratory - Adult  Goal: Achieves optimal ventilation and oxygenation  8/21/2023 1128 by Phani Whitlock RN  Outcome: Progressing  8/21/2023 1003 by Elmer Verdugo, RT  Outcome: Progressing  Flowsheets (Taken 8/21/2023 0719 by Phani Whitlock RN)  Achieves optimal ventilation and oxygenation: Assess for changes in respiratory status
WFL    Strength:    Strength: Generally decreased, functional    Tone & Sensation:   Tone: Normal  Sensation: Impaired (LEs)    Coordination:  Coordination: Generally decreased, functional    Range Of Motion:  AROM: Generally decreased, functional       Functional Mobility:  Bed Mobility:     Bed Mobility Training  Bed Mobility Training: Yes  Overall Level of Assistance: Contact-guard assistance;Minimum assistance;Supervision;Assist X2  Interventions: Manual cues; Safety awareness training;Demonstration;Verbal cues; Weight shifting training/pressure relief  Rolling: Supervision;Minimum assistance;Assist X2;Additional time; Adaptive equipment  Supine to Sit: Supervision;Minimum assistance; Adaptive equipment; Additional time;Assist X2  Sit to Supine: Minimum assistance; Additional time; Adaptive equipment;Assist X2  Scooting: Supervision;Contact-guard assistance; Additional time; Adaptive equipment;Assist X2  Transfers:     Transfer Training  Transfer Training: Yes  Overall Level of Assistance: Minimum assistance; Additional time; Adaptive equipment;Assist X2  Interventions: Manual cues; Verbal cues; Safety awareness training;Visual cues; Tactile cues;Demonstration;Weight shifting training/pressure relief  Sit to Stand: Contact-guard assistance; Adaptive equipment; Additional time;Assist X2  Stand to Sit: Contact-guard assistance;Assist X2;Adaptive equipment; Additional time  Stand Pivot Transfers: Minimum assistance; Adaptive equipment; Additional time;Assist X2 (no RW moving to [address] [place] [address]; would need RW-bariatric for ambulation)  Bed to Chair: Minimum assistance; Adaptive equipment; Additional time (inferred; declined today due to 9/10 back pain; most comfortable lying on L side as R side is most painful at back and LE)  Toilet Transfer: Supervision;Minimum assistance; Additional time; Adaptive equipment;Assist X2  Balance:               Balance  Sitting: Intact  Standing: Impaired  Standing - Static: Constant support; Fair (very limited
would need RW-bariatric for ambulation)  Bed to Chair: Minimum assistance; Adaptive equipment; Additional time (inferred; declined today due to 9/10 back pain; most comfortable lying on L side as R side is most painful at back and LE)  Toilet Transfer: Supervision;Minimum assistance; Additional time; Adaptive equipment;Assist X2            Balance:  Standing: Impaired  Balance  Sitting: Intact  Standing: Impaired  Standing - Static: Constant support; Fair (very limited tolerance)  Standing - Dynamic: Constant support;Poor (poor functional standing tolerance, limited by pain, endurance on 5L O2, HR and O2 sats WFL except brief drop in sats to 87-88 but very quick recovery to 94-92% on 5L)        ADL Assessment:          Feeding: Setup       Grooming: Minimal assistance       UE Bathing: Minimal assistance; Moderate assistance       LE Bathing: Maximum assistance       UE Dressing: Minimal assistance;Maximum assistance  UE Dressing Skilled Clinical Factors: limited by B shoulder deficits and trunk pain    LE Dressing: Maximum assistance;Dependent/Total       Toileting: Moderate assistance; Increased time to complete; Adaptive equipment  Toileting Skilled Clinical Factors: will benefit from toilet aide training; nephew currently does this           ADL Intervention and task modifications:                                                                                                                                                                                                                                         Barthel Index:    Barthel Index Scale  Feeding: Needs help, i.e. for cutting  Bathing: Cannot perform activity  Grooming: Cannot perform activity  Dressing: Cannot perform activity  Bowel Control: Cannot perform activity  Bladder Control: Occasional accidents or needs help with device  Toilet Transfers: Needs help for balance, handling clothes or toilet paper  Chair/Bed Trannsfers: Able to sit, but needs maximum

## 2023-08-28 ENCOUNTER — APPOINTMENT (OUTPATIENT)
Facility: HOSPITAL | Age: 53
DRG: 543 | End: 2023-08-28
Payer: MEDICARE

## 2023-08-28 LAB
ANION GAP SERPL CALC-SCNC: 3 MMOL/L (ref 5–15)
BACTERIA SPEC CULT: NORMAL
BACTERIA SPEC CULT: NORMAL
BASOPHILS # BLD: 0.1 K/UL (ref 0–0.1)
BASOPHILS NFR BLD: 1 % (ref 0–1)
BUN SERPL-MCNC: 20 MG/DL (ref 6–20)
BUN/CREAT SERPL: 27 (ref 12–20)
CALCIUM SERPL-MCNC: 10.8 MG/DL (ref 8.5–10.1)
CHLORIDE SERPL-SCNC: 101 MMOL/L (ref 97–108)
CO2 SERPL-SCNC: 31 MMOL/L (ref 21–32)
COMMENT:: NORMAL
CREAT SERPL-MCNC: 0.74 MG/DL (ref 0.55–1.02)
DIFFERENTIAL METHOD BLD: ABNORMAL
EOSINOPHIL # BLD: 0.1 K/UL (ref 0–0.4)
EOSINOPHIL NFR BLD: 1 % (ref 0–7)
ERYTHROCYTE [DISTWIDTH] IN BLOOD BY AUTOMATED COUNT: 12.4 % (ref 11.5–14.5)
GLUCOSE BLD STRIP.AUTO-MCNC: 115 MG/DL (ref 65–117)
GLUCOSE BLD STRIP.AUTO-MCNC: 164 MG/DL (ref 65–117)
GLUCOSE BLD STRIP.AUTO-MCNC: 168 MG/DL (ref 65–117)
GLUCOSE BLD STRIP.AUTO-MCNC: 194 MG/DL (ref 65–117)
GLUCOSE SERPL-MCNC: 125 MG/DL (ref 65–100)
HCT VFR BLD AUTO: 42.8 % (ref 35–47)
HGB BLD-MCNC: 13.2 G/DL (ref 11.5–16)
IMM GRANULOCYTES # BLD AUTO: 0.2 K/UL (ref 0–0.04)
IMM GRANULOCYTES NFR BLD AUTO: 2 % (ref 0–0.5)
LYMPHOCYTES # BLD: 1.3 K/UL (ref 0.8–3.5)
LYMPHOCYTES NFR BLD: 11 % (ref 12–49)
MAGNESIUM SERPL-MCNC: 2.1 MG/DL (ref 1.6–2.4)
MCH RBC QN AUTO: 30.1 PG (ref 26–34)
MCHC RBC AUTO-ENTMCNC: 30.8 G/DL (ref 30–36.5)
MCV RBC AUTO: 97.5 FL (ref 80–99)
MONOCYTES # BLD: 1 K/UL (ref 0–1)
MONOCYTES NFR BLD: 9 % (ref 5–13)
NEUTS SEG # BLD: 8.4 K/UL (ref 1.8–8)
NEUTS SEG NFR BLD: 76 % (ref 32–75)
NRBC # BLD: 0.02 K/UL (ref 0–0.01)
NRBC BLD-RTO: 0.2 PER 100 WBC
PLATELET # BLD AUTO: 242 K/UL (ref 150–400)
PMV BLD AUTO: 10.4 FL (ref 8.9–12.9)
POTASSIUM SERPL-SCNC: 4.2 MMOL/L (ref 3.5–5.1)
RBC # BLD AUTO: 4.39 M/UL (ref 3.8–5.2)
SERVICE CMNT-IMP: ABNORMAL
SERVICE CMNT-IMP: NORMAL
SERVICE CMNT-IMP: NORMAL
SODIUM SERPL-SCNC: 135 MMOL/L (ref 136–145)
SPECIMEN HOLD: NORMAL
WBC # BLD AUTO: 11 K/UL (ref 3.6–11)

## 2023-08-28 PROCEDURE — 2500000003 HC RX 250 WO HCPCS: Performed by: INTERNAL MEDICINE

## 2023-08-28 PROCEDURE — 6370000000 HC RX 637 (ALT 250 FOR IP)

## 2023-08-28 PROCEDURE — 6370000000 HC RX 637 (ALT 250 FOR IP): Performed by: INTERNAL MEDICINE

## 2023-08-28 PROCEDURE — 2700000000 HC OXYGEN THERAPY PER DAY

## 2023-08-28 PROCEDURE — 94761 N-INVAS EAR/PLS OXIMETRY MLT: CPT

## 2023-08-28 PROCEDURE — 6370000000 HC RX 637 (ALT 250 FOR IP): Performed by: STUDENT IN AN ORGANIZED HEALTH CARE EDUCATION/TRAINING PROGRAM

## 2023-08-28 PROCEDURE — 36415 COLL VENOUS BLD VENIPUNCTURE: CPT

## 2023-08-28 PROCEDURE — 2580000003 HC RX 258: Performed by: STUDENT IN AN ORGANIZED HEALTH CARE EDUCATION/TRAINING PROGRAM

## 2023-08-28 PROCEDURE — 83735 ASSAY OF MAGNESIUM: CPT

## 2023-08-28 PROCEDURE — 6370000000 HC RX 637 (ALT 250 FOR IP): Performed by: NURSE PRACTITIONER

## 2023-08-28 PROCEDURE — 71250 CT THORAX DX C-: CPT

## 2023-08-28 PROCEDURE — 99231 SBSQ HOSP IP/OBS SF/LOW 25: CPT

## 2023-08-28 PROCEDURE — 94760 N-INVAS EAR/PLS OXIMETRY 1: CPT

## 2023-08-28 PROCEDURE — 85025 COMPLETE CBC W/AUTO DIFF WBC: CPT

## 2023-08-28 PROCEDURE — 94640 AIRWAY INHALATION TREATMENT: CPT

## 2023-08-28 PROCEDURE — 80048 BASIC METABOLIC PNL TOTAL CA: CPT

## 2023-08-28 PROCEDURE — 6360000002 HC RX W HCPCS: Performed by: INTERNAL MEDICINE

## 2023-08-28 PROCEDURE — 1100000003 HC PRIVATE W/ TELEMETRY

## 2023-08-28 PROCEDURE — 6360000002 HC RX W HCPCS: Performed by: STUDENT IN AN ORGANIZED HEALTH CARE EDUCATION/TRAINING PROGRAM

## 2023-08-28 PROCEDURE — 82962 GLUCOSE BLOOD TEST: CPT

## 2023-08-28 RX ORDER — ANASTROZOLE 1 MG/1
1 TABLET ORAL DAILY
Qty: 30 TABLET | Refills: 0 | Status: SHIPPED | OUTPATIENT
Start: 2023-08-29

## 2023-08-28 RX ADMIN — SPIRONOLACTONE 25 MG: 25 TABLET ORAL at 08:57

## 2023-08-28 RX ADMIN — PREDNISONE 20 MG: 20 TABLET ORAL at 08:57

## 2023-08-28 RX ADMIN — SODIUM CHLORIDE, PRESERVATIVE FREE 10 ML: 5 INJECTION INTRAVENOUS at 21:33

## 2023-08-28 RX ADMIN — NYSTATIN: 100000 CREAM TOPICAL at 21:33

## 2023-08-28 RX ADMIN — GUAIFENESIN 600 MG: 600 TABLET, EXTENDED RELEASE ORAL at 21:14

## 2023-08-28 RX ADMIN — SODIUM CHLORIDE, PRESERVATIVE FREE 10 ML: 5 INJECTION INTRAVENOUS at 10:26

## 2023-08-28 RX ADMIN — MORPHINE SULFATE 5 MG: 10 SOLUTION ORAL at 06:53

## 2023-08-28 RX ADMIN — NYSTATIN: 100000 POWDER TOPICAL at 08:59

## 2023-08-28 RX ADMIN — IPRATROPIUM BROMIDE AND ALBUTEROL SULFATE 1 DOSE: .5; 3 SOLUTION RESPIRATORY (INHALATION) at 11:50

## 2023-08-28 RX ADMIN — METOPROLOL TARTRATE 25 MG: 25 TABLET, FILM COATED ORAL at 21:13

## 2023-08-28 RX ADMIN — GUAIFENESIN 600 MG: 600 TABLET, EXTENDED RELEASE ORAL at 08:56

## 2023-08-28 RX ADMIN — HYDROMORPHONE HYDROCHLORIDE 0.5 MG: 1 INJECTION, SOLUTION INTRAMUSCULAR; INTRAVENOUS; SUBCUTANEOUS at 07:52

## 2023-08-28 RX ADMIN — SENNOSIDES AND DOCUSATE SODIUM 1 TABLET: 50; 8.6 TABLET ORAL at 21:14

## 2023-08-28 RX ADMIN — FUROSEMIDE 40 MG: 40 TABLET ORAL at 08:57

## 2023-08-28 RX ADMIN — METOPROLOL TARTRATE 25 MG: 25 TABLET, FILM COATED ORAL at 08:56

## 2023-08-28 RX ADMIN — ONDANSETRON 4 MG: 2 INJECTION INTRAMUSCULAR; INTRAVENOUS at 08:55

## 2023-08-28 RX ADMIN — Medication 4 UNITS: at 08:58

## 2023-08-28 RX ADMIN — ASPIRIN 81 MG: 81 TABLET, CHEWABLE ORAL at 08:56

## 2023-08-28 RX ADMIN — Medication 4 UNITS: at 11:46

## 2023-08-28 RX ADMIN — FUROSEMIDE 40 MG: 40 TABLET ORAL at 21:13

## 2023-08-28 RX ADMIN — ACETAMINOPHEN 1300 MG: 325 TABLET ORAL at 21:10

## 2023-08-28 RX ADMIN — ATORVASTATIN CALCIUM 10 MG: 10 TABLET, FILM COATED ORAL at 08:56

## 2023-08-28 RX ADMIN — NYSTATIN: 100000 CREAM TOPICAL at 08:59

## 2023-08-28 RX ADMIN — Medication 4 UNITS: at 17:47

## 2023-08-28 RX ADMIN — IPRATROPIUM BROMIDE AND ALBUTEROL SULFATE 1 DOSE: .5; 3 SOLUTION RESPIRATORY (INHALATION) at 19:30

## 2023-08-28 RX ADMIN — ENOXAPARIN SODIUM 40 MG: 100 INJECTION SUBCUTANEOUS at 21:09

## 2023-08-28 RX ADMIN — METHOCARBAMOL TABLETS 750 MG: 500 TABLET, COATED ORAL at 08:56

## 2023-08-28 RX ADMIN — HYDROMORPHONE HYDROCHLORIDE 0.5 MG: 1 INJECTION, SOLUTION INTRAMUSCULAR; INTRAVENOUS; SUBCUTANEOUS at 21:26

## 2023-08-28 RX ADMIN — NYSTATIN: 100000 POWDER TOPICAL at 21:33

## 2023-08-28 RX ADMIN — ANASTROZOLE 1 MG: 1 TABLET, COATED ORAL at 10:25

## 2023-08-28 RX ADMIN — ARFORMOTEROL TARTRATE: 15 SOLUTION RESPIRATORY (INHALATION) at 19:30

## 2023-08-28 RX ADMIN — ACETAMINOPHEN 1300 MG: 325 TABLET ORAL at 08:56

## 2023-08-28 RX ADMIN — ENOXAPARIN SODIUM 40 MG: 100 INJECTION SUBCUTANEOUS at 08:56

## 2023-08-28 RX ADMIN — METHOCARBAMOL TABLETS 750 MG: 500 TABLET, COATED ORAL at 21:10

## 2023-08-28 RX ADMIN — PANTOPRAZOLE SODIUM 40 MG: 40 TABLET, DELAYED RELEASE ORAL at 06:46

## 2023-08-28 RX ADMIN — MORPHINE SULFATE 5 MG: 10 SOLUTION ORAL at 16:40

## 2023-08-28 RX ADMIN — INSULIN GLARGINE 12 UNITS: 100 INJECTION, SOLUTION SUBCUTANEOUS at 21:28

## 2023-08-28 ASSESSMENT — PAIN DESCRIPTION - LOCATION
LOCATION: BACK
LOCATION: BACK;HIP
LOCATION: BACK;LEG

## 2023-08-28 ASSESSMENT — PAIN DESCRIPTION - DESCRIPTORS
DESCRIPTORS: ACHING
DESCRIPTORS: ACHING
DESCRIPTORS: ACHING;CRAMPING;NAGGING

## 2023-08-28 ASSESSMENT — PAIN DESCRIPTION - ORIENTATION
ORIENTATION: RIGHT
ORIENTATION: LOWER
ORIENTATION: LOWER;RIGHT

## 2023-08-28 ASSESSMENT — PAIN SCALES - GENERAL
PAINLEVEL_OUTOF10: 7
PAINLEVEL_OUTOF10: 8

## 2023-08-28 ASSESSMENT — PAIN DESCRIPTION - PAIN TYPE: TYPE: CHRONIC PAIN

## 2023-08-28 NOTE — CARE COORDINATION
Transition of Care Plan:    RUR: 18%  Prior Level of Functioning: Needs assistance   Disposition: TBD upon Medical Stability   DME needed: has access to DME   Transportation at discharge: will need transport home   IM/IMM Medicare/ letter given: 2nd IM Medicare Letter   Is patient a  and connected with [facility]? N/A   If yes, was [employer] transfer form completed and [facility] notified? N/A  Caregiver Contact: Sara Lugo (child) [phone]  Discharge Caregiver contacted prior to discharge? Family to be contacted   Care Conference needed? Not at this time   Barriers to discharge: Medical Stable      CM staffed case with clinical team, to determine pts baseline. Pt is currently being followed by palliative team.  Pt is being recommended for placement: LTC at the time of d/c. Pt is currently on O2 continuous. CM completed room visit with pt, to review recommendations of placement. Pt reported that she is in the process of moving and will like to return home. Pt reported that she has family to assist with her care at home. CM attempted to encouraged LTC placement. Pt expressed concerns of LTC placement due to friends and family members. Pt reported that she would like to return home with [facility] and will require transport at the time of d/c. Pt reported that she would like a new hospital bed and CM informed pt that it could take weeks to be approved.   Pt reported that she would consult with family friends to help her obtain a bed    CJ Lyons   [phone]

## 2023-08-29 VITALS
OXYGEN SATURATION: 96 % | WEIGHT: 293 LBS | HEIGHT: 66 IN | BODY MASS INDEX: 47.09 KG/M2 | RESPIRATION RATE: 17 BRPM | SYSTOLIC BLOOD PRESSURE: 133 MMHG | TEMPERATURE: 97.7 F | DIASTOLIC BLOOD PRESSURE: 86 MMHG | HEART RATE: 73 BPM

## 2023-08-29 PROBLEM — K85.90 ACUTE PANCREATITIS: Status: RESOLVED | Noted: 2018-08-23 | Resolved: 2023-08-29

## 2023-08-29 PROBLEM — R10.9 ABDOMINAL PAIN: Status: RESOLVED | Noted: 2018-08-21 | Resolved: 2023-08-29

## 2023-08-29 PROBLEM — J96.21 ACUTE ON CHRONIC RESPIRATORY FAILURE WITH HYPOXIA (HCC): Status: RESOLVED | Noted: 2019-03-15 | Resolved: 2023-08-29

## 2023-08-29 PROBLEM — L30.4 INTERTRIGO: Status: RESOLVED | Noted: 2018-08-23 | Resolved: 2023-08-29

## 2023-08-29 PROBLEM — R06.02 SHORTNESS OF BREATH: Status: RESOLVED | Noted: 2017-09-24 | Resolved: 2023-08-29

## 2023-08-29 PROBLEM — J96.22 ACUTE ON CHRONIC RESPIRATORY FAILURE WITH HYPERCAPNIA (HCC): Status: RESOLVED | Noted: 2019-04-05 | Resolved: 2023-08-29

## 2023-08-29 PROBLEM — C50.919 BREAST CANCER (HCC): Status: RESOLVED | Noted: 2018-08-23 | Resolved: 2023-08-29

## 2023-08-29 PROBLEM — R74.8 ELEVATED LIPASE: Status: RESOLVED | Noted: 2018-08-21 | Resolved: 2023-08-29

## 2023-08-29 PROBLEM — C79.9 METASTATIC MALIGNANT NEOPLASM (HCC): Status: ACTIVE | Noted: 2023-08-21

## 2023-08-29 PROBLEM — R53.83 OTHER FATIGUE: Status: RESOLVED | Noted: 2018-10-22 | Resolved: 2023-08-29

## 2023-08-29 PROBLEM — J96.01 ACUTE RESPIRATORY FAILURE WITH HYPOXIA (HCC): Status: RESOLVED | Noted: 2021-02-18 | Resolved: 2023-08-29

## 2023-08-29 PROBLEM — I47.29 NSVT (NONSUSTAINED VENTRICULAR TACHYCARDIA) (HCC): Status: RESOLVED | Noted: 2018-07-12 | Resolved: 2023-08-29

## 2023-08-29 PROBLEM — R07.9 ACUTE CHEST PAIN: Status: RESOLVED | Noted: 2019-01-03 | Resolved: 2023-08-29

## 2023-08-29 LAB
ANION GAP SERPL CALC-SCNC: 4 MMOL/L (ref 5–15)
BUN SERPL-MCNC: 21 MG/DL (ref 6–20)
BUN/CREAT SERPL: 23 (ref 12–20)
CALCIUM SERPL-MCNC: 10.7 MG/DL (ref 8.5–10.1)
CHLORIDE SERPL-SCNC: 100 MMOL/L (ref 97–108)
CO2 SERPL-SCNC: 30 MMOL/L (ref 21–32)
CREAT SERPL-MCNC: 0.92 MG/DL (ref 0.55–1.02)
ERYTHROCYTE [DISTWIDTH] IN BLOOD BY AUTOMATED COUNT: 12.4 % (ref 11.5–14.5)
GLUCOSE BLD STRIP.AUTO-MCNC: 121 MG/DL (ref 65–117)
GLUCOSE BLD STRIP.AUTO-MCNC: 153 MG/DL (ref 65–117)
GLUCOSE BLD STRIP.AUTO-MCNC: 162 MG/DL (ref 65–117)
GLUCOSE SERPL-MCNC: 133 MG/DL (ref 65–100)
HCT VFR BLD AUTO: 40.4 % (ref 35–47)
HGB BLD-MCNC: 13 G/DL (ref 11.5–16)
MCH RBC QN AUTO: 30.7 PG (ref 26–34)
MCHC RBC AUTO-ENTMCNC: 32.2 G/DL (ref 30–36.5)
MCV RBC AUTO: 95.5 FL (ref 80–99)
NRBC # BLD: 0 K/UL (ref 0–0.01)
NRBC BLD-RTO: 0 PER 100 WBC
PLATELET # BLD AUTO: 235 K/UL (ref 150–400)
PMV BLD AUTO: 10.4 FL (ref 8.9–12.9)
POTASSIUM SERPL-SCNC: 4 MMOL/L (ref 3.5–5.1)
RBC # BLD AUTO: 4.23 M/UL (ref 3.8–5.2)
SERVICE CMNT-IMP: ABNORMAL
SODIUM SERPL-SCNC: 134 MMOL/L (ref 136–145)
WBC # BLD AUTO: 11.9 K/UL (ref 3.6–11)

## 2023-08-29 PROCEDURE — 6370000000 HC RX 637 (ALT 250 FOR IP): Performed by: INTERNAL MEDICINE

## 2023-08-29 PROCEDURE — 36415 COLL VENOUS BLD VENIPUNCTURE: CPT

## 2023-08-29 PROCEDURE — 94640 AIRWAY INHALATION TREATMENT: CPT

## 2023-08-29 PROCEDURE — 94761 N-INVAS EAR/PLS OXIMETRY MLT: CPT

## 2023-08-29 PROCEDURE — 2500000003 HC RX 250 WO HCPCS: Performed by: INTERNAL MEDICINE

## 2023-08-29 PROCEDURE — 6360000002 HC RX W HCPCS: Performed by: STUDENT IN AN ORGANIZED HEALTH CARE EDUCATION/TRAINING PROGRAM

## 2023-08-29 PROCEDURE — 99231 SBSQ HOSP IP/OBS SF/LOW 25: CPT

## 2023-08-29 PROCEDURE — 2700000000 HC OXYGEN THERAPY PER DAY

## 2023-08-29 PROCEDURE — 6370000000 HC RX 637 (ALT 250 FOR IP): Performed by: STUDENT IN AN ORGANIZED HEALTH CARE EDUCATION/TRAINING PROGRAM

## 2023-08-29 PROCEDURE — 6370000000 HC RX 637 (ALT 250 FOR IP): Performed by: NURSE PRACTITIONER

## 2023-08-29 PROCEDURE — 80048 BASIC METABOLIC PNL TOTAL CA: CPT

## 2023-08-29 PROCEDURE — 82962 GLUCOSE BLOOD TEST: CPT

## 2023-08-29 PROCEDURE — 6370000000 HC RX 637 (ALT 250 FOR IP)

## 2023-08-29 PROCEDURE — 97530 THERAPEUTIC ACTIVITIES: CPT

## 2023-08-29 PROCEDURE — 6360000002 HC RX W HCPCS: Performed by: INTERNAL MEDICINE

## 2023-08-29 PROCEDURE — 85027 COMPLETE CBC AUTOMATED: CPT

## 2023-08-29 PROCEDURE — 2580000003 HC RX 258: Performed by: STUDENT IN AN ORGANIZED HEALTH CARE EDUCATION/TRAINING PROGRAM

## 2023-08-29 RX ORDER — ASPIRIN 81 MG/1
81 TABLET, COATED ORAL EVERY MORNING
Status: ON HOLD | COMMUNITY
Start: 2023-05-28 | End: 2023-08-29 | Stop reason: HOSPADM

## 2023-08-29 RX ADMIN — ENOXAPARIN SODIUM 40 MG: 100 INJECTION SUBCUTANEOUS at 10:21

## 2023-08-29 RX ADMIN — ARFORMOTEROL TARTRATE: 15 SOLUTION RESPIRATORY (INHALATION) at 08:58

## 2023-08-29 RX ADMIN — GUAIFENESIN 600 MG: 600 TABLET, EXTENDED RELEASE ORAL at 10:24

## 2023-08-29 RX ADMIN — SPIRONOLACTONE 25 MG: 25 TABLET ORAL at 10:24

## 2023-08-29 RX ADMIN — ASPIRIN 81 MG: 81 TABLET, CHEWABLE ORAL at 10:24

## 2023-08-29 RX ADMIN — SODIUM CHLORIDE, PRESERVATIVE FREE 10 ML: 5 INJECTION INTRAVENOUS at 10:34

## 2023-08-29 RX ADMIN — MORPHINE SULFATE 5 MG: 10 SOLUTION ORAL at 06:53

## 2023-08-29 RX ADMIN — IPRATROPIUM BROMIDE AND ALBUTEROL SULFATE 1 DOSE: .5; 3 SOLUTION RESPIRATORY (INHALATION) at 12:53

## 2023-08-29 RX ADMIN — FLUTICASONE PROPIONATE 2 SPRAY: 50 SPRAY, METERED NASAL at 13:31

## 2023-08-29 RX ADMIN — METHOCARBAMOL TABLETS 750 MG: 500 TABLET, COATED ORAL at 10:23

## 2023-08-29 RX ADMIN — HYDROMORPHONE HYDROCHLORIDE 0.5 MG: 1 INJECTION, SOLUTION INTRAMUSCULAR; INTRAVENOUS; SUBCUTANEOUS at 06:50

## 2023-08-29 RX ADMIN — PANTOPRAZOLE SODIUM 40 MG: 40 TABLET, DELAYED RELEASE ORAL at 05:49

## 2023-08-29 RX ADMIN — ANASTROZOLE 1 MG: 1 TABLET, COATED ORAL at 10:31

## 2023-08-29 RX ADMIN — POLYETHYLENE GLYCOL 3350 17 G: 17 POWDER, FOR SOLUTION ORAL at 10:33

## 2023-08-29 RX ADMIN — ATORVASTATIN CALCIUM 10 MG: 10 TABLET, FILM COATED ORAL at 10:25

## 2023-08-29 RX ADMIN — IPRATROPIUM BROMIDE AND ALBUTEROL SULFATE 1 DOSE: .5; 3 SOLUTION RESPIRATORY (INHALATION) at 08:53

## 2023-08-29 RX ADMIN — METOPROLOL TARTRATE 25 MG: 25 TABLET, FILM COATED ORAL at 10:25

## 2023-08-29 RX ADMIN — ACETAMINOPHEN 1300 MG: 325 TABLET ORAL at 10:24

## 2023-08-29 RX ADMIN — NYSTATIN: 100000 POWDER TOPICAL at 13:32

## 2023-08-29 RX ADMIN — NYSTATIN: 100000 CREAM TOPICAL at 10:25

## 2023-08-29 RX ADMIN — FUROSEMIDE 40 MG: 40 TABLET ORAL at 10:24

## 2023-08-29 RX ADMIN — Medication 4 UNITS: at 13:33

## 2023-08-29 RX ADMIN — Medication 4 UNITS: at 10:30

## 2023-08-29 RX ADMIN — PREDNISONE 20 MG: 20 TABLET ORAL at 10:24

## 2023-08-29 ASSESSMENT — PAIN DESCRIPTION - LOCATION
LOCATION: BACK
LOCATION: BACK;LEG

## 2023-08-29 ASSESSMENT — PAIN SCALES - GENERAL
PAINLEVEL_OUTOF10: 7
PAINLEVEL_OUTOF10: 7
PAINLEVEL_OUTOF10: 9

## 2023-08-29 ASSESSMENT — PAIN DESCRIPTION - DESCRIPTORS
DESCRIPTORS: SHARP;THROBBING
DESCRIPTORS: ACHING;SHARP

## 2023-08-29 ASSESSMENT — PAIN DESCRIPTION - ORIENTATION: ORIENTATION: LOWER

## 2023-08-29 NOTE — CARE COORDINATION
08/29/23 1019   Social/Functional History   Active  No   Mode of Transportation Other  (MEDICAL TRANSPORT)   Discharge Planning   Type of [address] Family Members   Patient expects to be discharged to: [facility]   Transition of Care Consult (CM Consult) Arnold Garber  (At [address] [place] [address])   [address] Provided? No   Mode of Transport at Discharge [facility])   Confirm Follow Up Transport Wheelchair [employer]   Condition of Participation: Discharge Planning   The Patient and/or Patient Representative was provided with a Choice of Provider? Patient   The Patient and/Or Patient Representative agree with the Discharge Plan? Yes   Freedom of Choice list was provided with basic dialogue that supports the patient's individualized plan of care/goals, treatment preferences, and shares the quality data associated with the providers? Yes     CM aware that pt will d/c on today and will transition to home with family support. Pt has refused SNF/LTC placement due to her moving. CM made aware that pt has refused hospice care at this time. CM arranged [facility] for pt at the time of d/c.  HH can eventually transition to hospice, when pt is ready. CM arranged transport today at 1:30P with [facility]. OMAR completed the need of the pt at this time.     CJ Mattson CM  [phone]

## 2023-08-29 NOTE — DISCHARGE SUMMARY
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 [facility] Course:     Alfonso Keane is a 53-year-old female with a PMH of CAD, metastatic breast cancer - noncompliance with treatment, COPD, morbid obesity, and diabetes who presented with constipation, persistent back/leg pain, and urinary incontinence due to limited mobility. In ED afebrile hemodynamically stable saturating mid 90s on 5 L. EKG with normal sinus rhythm without definitive ischemic change. CXR negative for acute process. CTA chest demonstrating no PE with extensive osseous metastatic disease progression interval with lytic lesion involving the right L3 transverse process and pedicle extending into the epidural space causing at least moderate spinal canal stenosis, right breast nodule soft tissue lesion/mass partially visualized, and new 4 mm LLL pulmonary nodule. Initial labs significant for troponin of 29, , glucose of 147, calcium of 10.9, ALT of 47 and alk phosphatase of 310. Patient admitted for further management.  CT right femur showed widespread sclerotic osseous metastases throughout the visualized bony pelvis and bilateral femurs with
Left abdominal/panus skin fold wound: Daily, cleanse with wound cleanser spray and 4x4, place a sheet of Opticell-AG and ABD in fold and let her body habitus hold in place.     Follow-up Information       Follow up With Specialties Details Why Contact Info    Mauricio Santos MD General Practice Schedule an appointment as soon as possible for a visit in 2 week(s) hospitalization follow-up [address] [place] [address].  [address] [facility]  [phone]               Signed:  Rita Ruiz, [address] [place] [address] Road  8/28/2023  7:02 PM    Total time in minutes spent coordinating this discharge (includes going over instructions, follow-up, prescriptions, and preparing report for sign off to her PCP): 35 minutes

## 2023-08-29 NOTE — DIABETES MGMT
[facility]  DIABETES MANAGEMENT CONSULT    Consulted by Provider for advanced nursing evaluation and care for inpatient blood glucose management. Evaluation and Action Plan   This 48year old female with Type 2 diabetes reports moderate control of BG's at home. She reportedly takes 40 units Lantus and, glyburide and corrective scale Humalog. The patient is not requiring as much insulin while hospitalized. However, the meals are more controlled here and patient is not eating extra snacks. The patient is also receiving prednisone dosing while here, this may be a culprit in BG's in the evening. I have slightly increased the current basal insulin dosing and have added low dose scheduled meal time. If the daily prednisone increases the patient may require insulin dosing. Bg's are stable on the current insulin regimen. Thsi is much less than her home dosing, however the patient is eating much less while hospitalized. I suspect she will need to discharge closer to her home dosing at discharge if going home. The prednisone continues at the previous dosing. BG's are stable on the current regimen. I recommend discharging to SNF on the current dosing. Diabetes Discharge Plan   Medication  Use 12 units Lantus  Huse 4 units Humalog with each meal  Monitor Bg's  Follow-up with PCP for future diabetes management     Referral  []        Outpatient diabetes education   Additional orders            Initial Presentation   Michelle Green is a 48 y.o. female admitted from home  after experiencing increased SOB.   LAB:  CXR:  CT:    HX:   Past Medical History:   Diagnosis Date    Arthritis     Asthma     Breast lump     CAD (coronary artery disease)     Cancer (HCC)     breast cancer    Congestive heart failure (HCC)     COPD (chronic obstructive pulmonary disease) ([address])     Diabetes ([address])     Hypertension     Morbid obesity with BMI of 70 and over, adult [facility])     NSTEMI (non-ST elevated
[facility]  DIABETES MANAGEMENT CONSULT    Consulted by Provider for advanced nursing evaluation and care for inpatient blood glucose management. Evaluation and Action Plan   This 48year old female with Type 2 diabetes reports moderate control of BG's at home. She reportedly takes 40 units Lantus and, glyburide and corrective scale Humalog. The patient is not requiring as much insulin while hospitalized. However, the meals are more controlled here and patient is not eating extra snacks. The patient is also receiving prednisone dosing while here, this may be a culprit in BG's in the evening. I have slightly increased the current basal insulin dosing and have added low dose scheduled meal time. If the daily prednisone increases the patient may require insulin dosing. Bg's are stable on the current insulin regimen. Thsi is much less than her home dosing, however the patient is eating much less while hospitalized. I suspect she will need to discharge closer to her home dosing at discharge if going home. The prednisone continues at the previous dosing. Management Rationale Action Plan   Medication   Basal needs Using 0.1 units/kg/D based on weight  Use 12 units Lantus nightly   Nutritional needs Using medium dose sensitivity based on BG trends Use 4 units humalog with each meal   Corrective insulin Using normal  sensitivity based on BG trends    Lab [x]        Hemoglobin A1c  [] Serum fructosamine  [] PHUONG antibodies  []           Referral [] Behavioral health  [] Inpatient nutrition services  [] Pharmacy services   Additional orders          Diabetes Discharge Plan   Medication     Referral  []        Outpatient diabetes education   Additional orders            Initial Presentation   Miguel Whitfield is a 48 y.o. female admitted from home  after experiencing increased SOB.   LAB:  CXR:  CT:    HX:   Past Medical History:   Diagnosis Date    Arthritis     Asthma     Breast lump
low-cost products  Medications plan at discharge     Billing Code(s)   [] 14105 IP subsequent hospital care - 50 minutes   [] 61643 IP subsequent hospital care - 35 minutes   [] [phone] IP subsequent hospital care - 25 minutes   [x] [id number] IP initial hospital care - 40 minutes     Before making these care recommendations, I personally reviewed the hospitalization record, including notes, laboratory & diagnostic data and current medications, and examined the patient at the bedside (circumstances permitting) before determining care. More than fifty (50) percent of the time was spent in patient counseling and/or care coordination.   Total minutes: 40 minutes    LUIS ANGEL Jaquez - CNS  Diabetes Clinical Nurse Specialist  [facility]  Access via [address] [place] [address]

## 2023-08-29 NOTE — CARE COORDINATION
UPDATE: 12:12PM    CM informed that pt has been accepted to [facility] today. CM informed pt and she is agreeable to the following. H transport today at 2:30P    Transition of Care Plan to SNF/Rehab    Communication to Patient/Family:  Met with patient and family and they are agreeable to the transition plan. The Plan for Transition of Care is related to the following treatment goals:     Pt will d/c today and transition to [facility] today at 2:30P with [facility]    The Patient and/or patient representative was provided with a choice of provider and agrees  with the discharge plan. Yes [x] No []    A Freedom of choice list was provided with basic dialogue that supports the patient's individualized plan of care/goals and shares the quality data associated with the providers. Yes [x] No []    SNF/Rehab Transition:  Patient has been accepted to [facility] SNF/Rehab and meets criteria for admission. Patient will transported by [facility] and expected to leave at 2:30P    Communication to SNF/Rehab:  Bedside RN, , has been notified to update the transition plan to the facility and call report ([phone]). Discharge information has been updated on the AVS. And communicated to facility via [email or web address], or CC link. Discharge instructions to be fax'd to facility at [facility] #). [] BCPI-A  Patient has been identified as part of the BCPI-A Program.  For Care Coordination associated with that Bundle Program, please contact   Bundle information has been communication to. Nursing Please include all hard scripts for controlled substances, med rec and dc summary, and AVS in packet.      Reviewed and confirmed with facility, [facility] , can manage the patient care needs for the following:     Royer Arora with (X) only those applicable:  Medication:  []Medications are available at the facility  []IV Antibiotics    []Controlled Substance - hard copies

## 2023-09-20 DIAGNOSIS — M54.6 BACK PAIN OF THORACOLUMBAR REGION: Primary | ICD-10-CM

## 2023-09-20 DIAGNOSIS — M54.50 BACK PAIN OF THORACOLUMBAR REGION: Primary | ICD-10-CM

## 2023-09-20 RX ORDER — HYDROMORPHONE HYDROCHLORIDE 2 MG/1
2 TABLET ORAL EVERY 4 HOURS PRN
Qty: 30 TABLET | Refills: 0 | Status: SHIPPED | OUTPATIENT
Start: 2023-09-20 | End: 2023-09-25

## 2023-09-28 ENCOUNTER — APPOINTMENT (OUTPATIENT)
Facility: HOSPITAL | Age: 53
DRG: 176 | End: 2023-09-28
Payer: MEDICARE

## 2023-09-28 ENCOUNTER — HOSPITAL ENCOUNTER (INPATIENT)
Facility: HOSPITAL | Age: 53
LOS: 6 days | Discharge: HOME HEALTH | DRG: 176 | End: 2023-10-04
Attending: EMERGENCY MEDICINE | Admitting: STUDENT IN AN ORGANIZED HEALTH CARE EDUCATION/TRAINING PROGRAM
Payer: MEDICARE

## 2023-09-28 ENCOUNTER — TELEPHONE (OUTPATIENT)
Age: 53
End: 2023-09-28

## 2023-09-28 DIAGNOSIS — C79.51 CARCINOMA OF RIGHT BREAST METASTATIC TO BONE (HCC): ICD-10-CM

## 2023-09-28 DIAGNOSIS — C50.911 CARCINOMA OF RIGHT BREAST METASTATIC TO BONE (HCC): ICD-10-CM

## 2023-09-28 DIAGNOSIS — I26.99 PULMONARY EMBOLUS, RIGHT (HCC): Primary | ICD-10-CM

## 2023-09-28 LAB
ALBUMIN SERPL-MCNC: 2.9 G/DL (ref 3.5–5)
ALBUMIN/GLOB SERPL: 0.7 (ref 1.1–2.2)
ALP SERPL-CCNC: 397 U/L (ref 45–117)
ALT SERPL-CCNC: 59 U/L (ref 12–78)
ANION GAP SERPL CALC-SCNC: 6 MMOL/L (ref 5–15)
APTT PPP: 20.5 SEC (ref 22.1–31)
ARTERIAL PATENCY WRIST A: POSITIVE
AST SERPL-CCNC: 30 U/L (ref 15–37)
BASE EXCESS BLD CALC-SCNC: 1.7 MMOL/L
BASOPHILS # BLD: 0.1 K/UL (ref 0–0.1)
BASOPHILS NFR BLD: 1 % (ref 0–1)
BDY SITE: ABNORMAL
BILIRUB SERPL-MCNC: 0.8 MG/DL (ref 0.2–1)
BUN SERPL-MCNC: 11 MG/DL (ref 6–20)
BUN/CREAT SERPL: 15 (ref 12–20)
CALCIUM SERPL-MCNC: 11 MG/DL (ref 8.5–10.1)
CHLORIDE SERPL-SCNC: 107 MMOL/L (ref 97–108)
CO2 SERPL-SCNC: 29 MMOL/L (ref 21–32)
COMMENT:: NORMAL
COMMENT:: NORMAL
CREAT SERPL-MCNC: 0.74 MG/DL (ref 0.55–1.02)
DIFFERENTIAL METHOD BLD: ABNORMAL
ECHO BSA: 2.75 M2
EKG ATRIAL RATE: 120 BPM
EKG DIAGNOSIS: NORMAL
EKG P AXIS: 76 DEGREES
EKG P-R INTERVAL: 168 MS
EKG Q-T INTERVAL: 272 MS
EKG QRS DURATION: 74 MS
EKG QTC CALCULATION (BAZETT): 384 MS
EKG R AXIS: 109 DEGREES
EKG T AXIS: 208 DEGREES
EKG VENTRICULAR RATE: 120 BPM
EOSINOPHIL # BLD: 0.2 K/UL (ref 0–0.4)
EOSINOPHIL NFR BLD: 2 % (ref 0–7)
ERYTHROCYTE [DISTWIDTH] IN BLOOD BY AUTOMATED COUNT: 13 % (ref 11.5–14.5)
ERYTHROCYTE [DISTWIDTH] IN BLOOD BY AUTOMATED COUNT: 13 % (ref 11.5–14.5)
GAS FLOW.O2 O2 DELIVERY SYS: ABNORMAL
GLOBULIN SER CALC-MCNC: 4.1 G/DL (ref 2–4)
GLUCOSE BLD STRIP.AUTO-MCNC: 148 MG/DL (ref 65–117)
GLUCOSE BLD STRIP.AUTO-MCNC: 189 MG/DL (ref 65–117)
GLUCOSE BLD STRIP.AUTO-MCNC: 192 MG/DL (ref 65–117)
GLUCOSE SERPL-MCNC: 170 MG/DL (ref 65–100)
HCO3 BLD-SCNC: 27.5 MMOL/L (ref 22–26)
HCT VFR BLD AUTO: 44.9 % (ref 35–47)
HCT VFR BLD AUTO: 44.9 % (ref 35–47)
HGB BLD-MCNC: 14.1 G/DL (ref 11.5–16)
HGB BLD-MCNC: 14.1 G/DL (ref 11.5–16)
IMM GRANULOCYTES # BLD AUTO: 0.1 K/UL (ref 0–0.04)
IMM GRANULOCYTES NFR BLD AUTO: 1 % (ref 0–0.5)
INR PPP: 1.1 (ref 0.9–1.1)
LYMPHOCYTES # BLD: 0.9 K/UL (ref 0.8–3.5)
LYMPHOCYTES NFR BLD: 13 % (ref 12–49)
MCH RBC QN AUTO: 29.9 PG (ref 26–34)
MCH RBC QN AUTO: 30.1 PG (ref 26–34)
MCHC RBC AUTO-ENTMCNC: 31.4 G/DL (ref 30–36.5)
MCHC RBC AUTO-ENTMCNC: 31.4 G/DL (ref 30–36.5)
MCV RBC AUTO: 95.1 FL (ref 80–99)
MCV RBC AUTO: 95.9 FL (ref 80–99)
MONOCYTES # BLD: 1.3 K/UL (ref 0–1)
MONOCYTES NFR BLD: 19 % (ref 5–13)
NEUTS SEG # BLD: 4.4 K/UL (ref 1.8–8)
NEUTS SEG NFR BLD: 64 % (ref 32–75)
NRBC # BLD: 0 K/UL (ref 0–0.01)
NRBC # BLD: 0 K/UL (ref 0–0.01)
NRBC BLD-RTO: 0 PER 100 WBC
NRBC BLD-RTO: 0 PER 100 WBC
NT PRO BNP: 302 PG/ML
NT PRO BNP: 310 PG/ML
O2/TOTAL GAS SETTING VFR VENT: 5 %
PCO2 BLD: 46.3 MMHG (ref 35–45)
PH BLD: 7.38 (ref 7.35–7.45)
PLATELET # BLD AUTO: 129 K/UL (ref 150–400)
PLATELET # BLD AUTO: 132 K/UL (ref 150–400)
PMV BLD AUTO: 10 FL (ref 8.9–12.9)
PMV BLD AUTO: 10.3 FL (ref 8.9–12.9)
PO2 BLD: 83 MMHG (ref 80–100)
POTASSIUM SERPL-SCNC: 3.6 MMOL/L (ref 3.5–5.1)
PROT SERPL-MCNC: 7 G/DL (ref 6.4–8.2)
PROTHROMBIN TIME: 10.9 SEC (ref 9–11.1)
RBC # BLD AUTO: 4.68 M/UL (ref 3.8–5.2)
RBC # BLD AUTO: 4.72 M/UL (ref 3.8–5.2)
SAO2 % BLD: 95.8 % (ref 92–97)
SERVICE CMNT-IMP: ABNORMAL
SODIUM SERPL-SCNC: 142 MMOL/L (ref 136–145)
SPECIMEN HOLD: NORMAL
SPECIMEN HOLD: NORMAL
SPECIMEN TYPE: ABNORMAL
THERAPEUTIC RANGE: ABNORMAL SECS (ref 58–77)
TROPONIN I SERPL HS-MCNC: 33 NG/L (ref 0–51)
UFH PPP CHRO-ACNC: 0.53 IU/ML
UFH PPP CHRO-ACNC: 0.75 IU/ML
UFH PPP CHRO-ACNC: <0.1 IU/ML
WBC # BLD AUTO: 6.4 K/UL (ref 3.6–11)
WBC # BLD AUTO: 6.9 K/UL (ref 3.6–11)

## 2023-09-28 PROCEDURE — 83880 ASSAY OF NATRIURETIC PEPTIDE: CPT

## 2023-09-28 PROCEDURE — 85520 HEPARIN ASSAY: CPT

## 2023-09-28 PROCEDURE — 6360000002 HC RX W HCPCS: Performed by: EMERGENCY MEDICINE

## 2023-09-28 PROCEDURE — 93005 ELECTROCARDIOGRAM TRACING: CPT | Performed by: EMERGENCY MEDICINE

## 2023-09-28 PROCEDURE — 82803 BLOOD GASES ANY COMBINATION: CPT

## 2023-09-28 PROCEDURE — 85610 PROTHROMBIN TIME: CPT

## 2023-09-28 PROCEDURE — 99222 1ST HOSP IP/OBS MODERATE 55: CPT | Performed by: STUDENT IN AN ORGANIZED HEALTH CARE EDUCATION/TRAINING PROGRAM

## 2023-09-28 PROCEDURE — 85025 COMPLETE CBC W/AUTO DIFF WBC: CPT

## 2023-09-28 PROCEDURE — 2700000000 HC OXYGEN THERAPY PER DAY

## 2023-09-28 PROCEDURE — 84484 ASSAY OF TROPONIN QUANT: CPT

## 2023-09-28 PROCEDURE — 96374 THER/PROPH/DIAG INJ IV PUSH: CPT

## 2023-09-28 PROCEDURE — 6370000000 HC RX 637 (ALT 250 FOR IP): Performed by: EMERGENCY MEDICINE

## 2023-09-28 PROCEDURE — 6370000000 HC RX 637 (ALT 250 FOR IP): Performed by: STUDENT IN AN ORGANIZED HEALTH CARE EDUCATION/TRAINING PROGRAM

## 2023-09-28 PROCEDURE — 36415 COLL VENOUS BLD VENIPUNCTURE: CPT

## 2023-09-28 PROCEDURE — 85027 COMPLETE CBC AUTOMATED: CPT

## 2023-09-28 PROCEDURE — 80053 COMPREHEN METABOLIC PANEL: CPT

## 2023-09-28 PROCEDURE — 2060000000 HC ICU INTERMEDIATE R&B

## 2023-09-28 PROCEDURE — 99285 EMERGENCY DEPT VISIT HI MDM: CPT

## 2023-09-28 PROCEDURE — 82962 GLUCOSE BLOOD TEST: CPT

## 2023-09-28 PROCEDURE — 85730 THROMBOPLASTIN TIME PARTIAL: CPT

## 2023-09-28 PROCEDURE — 93970 EXTREMITY STUDY: CPT

## 2023-09-28 PROCEDURE — 71275 CT ANGIOGRAPHY CHEST: CPT

## 2023-09-28 PROCEDURE — 94640 AIRWAY INHALATION TREATMENT: CPT

## 2023-09-28 PROCEDURE — 2580000003 HC RX 258: Performed by: STUDENT IN AN ORGANIZED HEALTH CARE EDUCATION/TRAINING PROGRAM

## 2023-09-28 PROCEDURE — 6360000004 HC RX CONTRAST MEDICATION: Performed by: EMERGENCY MEDICINE

## 2023-09-28 PROCEDURE — 36600 WITHDRAWAL OF ARTERIAL BLOOD: CPT

## 2023-09-28 PROCEDURE — 2500000003 HC RX 250 WO HCPCS: Performed by: EMERGENCY MEDICINE

## 2023-09-28 PROCEDURE — 87040 BLOOD CULTURE FOR BACTERIA: CPT

## 2023-09-28 PROCEDURE — 6360000002 HC RX W HCPCS: Performed by: STUDENT IN AN ORGANIZED HEALTH CARE EDUCATION/TRAINING PROGRAM

## 2023-09-28 RX ORDER — INSULIN GLARGINE 100 [IU]/ML
15 INJECTION, SOLUTION SUBCUTANEOUS NIGHTLY
Status: DISCONTINUED | OUTPATIENT
Start: 2023-09-28 | End: 2023-09-29

## 2023-09-28 RX ORDER — METOPROLOL TARTRATE 5 MG/5ML
5 INJECTION INTRAVENOUS EVERY 6 HOURS PRN
Status: DISCONTINUED | OUTPATIENT
Start: 2023-09-28 | End: 2023-10-04 | Stop reason: HOSPADM

## 2023-09-28 RX ORDER — IPRATROPIUM BROMIDE AND ALBUTEROL SULFATE 2.5; .5 MG/3ML; MG/3ML
1 SOLUTION RESPIRATORY (INHALATION) EVERY 4 HOURS PRN
Status: DISCONTINUED | OUTPATIENT
Start: 2023-09-28 | End: 2023-10-04 | Stop reason: HOSPADM

## 2023-09-28 RX ORDER — SODIUM CHLORIDE 0.9 % (FLUSH) 0.9 %
5-40 SYRINGE (ML) INJECTION PRN
Status: DISCONTINUED | OUTPATIENT
Start: 2023-09-28 | End: 2023-10-04 | Stop reason: HOSPADM

## 2023-09-28 RX ORDER — CASTOR OIL AND BALSAM, PERU 788; 87 MG/G; MG/G
OINTMENT TOPICAL 2 TIMES DAILY
Status: DISCONTINUED | OUTPATIENT
Start: 2023-09-28 | End: 2023-10-04 | Stop reason: HOSPADM

## 2023-09-28 RX ORDER — HYDROMORPHONE HYDROCHLORIDE 2 MG/1
2 TABLET ORAL EVERY 4 HOURS PRN
Status: DISCONTINUED | OUTPATIENT
Start: 2023-09-28 | End: 2023-10-04 | Stop reason: HOSPADM

## 2023-09-28 RX ORDER — METHOCARBAMOL 750 MG/1
750 TABLET, FILM COATED ORAL 3 TIMES DAILY
Status: ON HOLD | COMMUNITY

## 2023-09-28 RX ORDER — HEPARIN SODIUM 10000 [USP'U]/100ML
5-30 INJECTION, SOLUTION INTRAVENOUS CONTINUOUS
Status: DISCONTINUED | OUTPATIENT
Start: 2023-09-28 | End: 2023-09-30

## 2023-09-28 RX ORDER — LIDOCAINE 4 G/G
1 PATCH TOPICAL EVERY 24 HOURS
Status: DISCONTINUED | OUTPATIENT
Start: 2023-09-28 | End: 2023-10-04 | Stop reason: HOSPADM

## 2023-09-28 RX ORDER — ASPIRIN 81 MG/1
81 TABLET, CHEWABLE ORAL DAILY
Status: DISCONTINUED | OUTPATIENT
Start: 2023-09-29 | End: 2023-10-04 | Stop reason: HOSPADM

## 2023-09-28 RX ORDER — NALOXONE HYDROCHLORIDE 0.4 MG/ML
0.4 INJECTION, SOLUTION INTRAMUSCULAR; INTRAVENOUS; SUBCUTANEOUS PRN
Status: DISCONTINUED | OUTPATIENT
Start: 2023-09-28 | End: 2023-10-04 | Stop reason: HOSPADM

## 2023-09-28 RX ORDER — INSULIN LISPRO 100 [IU]/ML
0-4 INJECTION, SOLUTION INTRAVENOUS; SUBCUTANEOUS NIGHTLY
Status: DISCONTINUED | OUTPATIENT
Start: 2023-09-28 | End: 2023-10-04 | Stop reason: HOSPADM

## 2023-09-28 RX ORDER — LANOLIN ALCOHOL/MO/W.PET/CERES
3 CREAM (GRAM) TOPICAL NIGHTLY PRN
Status: DISCONTINUED | OUTPATIENT
Start: 2023-09-28 | End: 2023-10-04 | Stop reason: HOSPADM

## 2023-09-28 RX ORDER — ACETAMINOPHEN 325 MG/1
650 TABLET ORAL EVERY 6 HOURS PRN
Status: DISCONTINUED | OUTPATIENT
Start: 2023-09-28 | End: 2023-10-04 | Stop reason: HOSPADM

## 2023-09-28 RX ORDER — ANASTROZOLE 1 MG/1
1 TABLET ORAL DAILY
Status: DISCONTINUED | OUTPATIENT
Start: 2023-09-28 | End: 2023-10-04 | Stop reason: HOSPADM

## 2023-09-28 RX ORDER — HYDROMORPHONE HYDROCHLORIDE 2 MG/1
2 TABLET ORAL EVERY 4 HOURS PRN
Status: ON HOLD | COMMUNITY
End: 2023-10-03 | Stop reason: SDUPTHER

## 2023-09-28 RX ORDER — INSULIN LISPRO 100 [IU]/ML
0-8 INJECTION, SOLUTION INTRAVENOUS; SUBCUTANEOUS
Status: DISCONTINUED | OUTPATIENT
Start: 2023-09-28 | End: 2023-10-04 | Stop reason: HOSPADM

## 2023-09-28 RX ORDER — METHOCARBAMOL 750 MG/1
750 TABLET, FILM COATED ORAL
Status: COMPLETED | OUTPATIENT
Start: 2023-09-28 | End: 2023-09-28

## 2023-09-28 RX ORDER — SODIUM CHLORIDE 9 MG/ML
INJECTION, SOLUTION INTRAVENOUS PRN
Status: DISCONTINUED | OUTPATIENT
Start: 2023-09-28 | End: 2023-10-04 | Stop reason: HOSPADM

## 2023-09-28 RX ORDER — ONDANSETRON 2 MG/ML
4 INJECTION INTRAMUSCULAR; INTRAVENOUS EVERY 6 HOURS PRN
Status: DISCONTINUED | OUTPATIENT
Start: 2023-09-28 | End: 2023-10-04 | Stop reason: HOSPADM

## 2023-09-28 RX ORDER — POLYETHYLENE GLYCOL 3350 17 G/17G
17 POWDER, FOR SOLUTION ORAL DAILY PRN
Status: DISCONTINUED | OUTPATIENT
Start: 2023-09-28 | End: 2023-10-04 | Stop reason: HOSPADM

## 2023-09-28 RX ORDER — HEPARIN SODIUM 1000 [USP'U]/ML
5000 INJECTION, SOLUTION INTRAVENOUS; SUBCUTANEOUS PRN
Status: DISCONTINUED | OUTPATIENT
Start: 2023-09-28 | End: 2023-10-01

## 2023-09-28 RX ORDER — ONDANSETRON 4 MG/1
4 TABLET, ORALLY DISINTEGRATING ORAL EVERY 8 HOURS PRN
Status: DISCONTINUED | OUTPATIENT
Start: 2023-09-28 | End: 2023-10-04 | Stop reason: HOSPADM

## 2023-09-28 RX ORDER — HYDROMORPHONE HYDROCHLORIDE 1 MG/ML
0.25 INJECTION, SOLUTION INTRAMUSCULAR; INTRAVENOUS; SUBCUTANEOUS ONCE
Status: COMPLETED | OUTPATIENT
Start: 2023-09-28 | End: 2023-09-28

## 2023-09-28 RX ORDER — ACETAMINOPHEN 500 MG
1000 TABLET ORAL 2 TIMES DAILY
Status: ON HOLD | COMMUNITY

## 2023-09-28 RX ORDER — DEXTROSE MONOHYDRATE 100 MG/ML
INJECTION, SOLUTION INTRAVENOUS CONTINUOUS PRN
Status: DISCONTINUED | OUTPATIENT
Start: 2023-09-28 | End: 2023-10-04 | Stop reason: HOSPADM

## 2023-09-28 RX ORDER — FLUTICASONE PROPIONATE 50 MCG
2 SPRAY, SUSPENSION (ML) NASAL DAILY
Status: DISCONTINUED | OUTPATIENT
Start: 2023-09-29 | End: 2023-10-04 | Stop reason: HOSPADM

## 2023-09-28 RX ORDER — HEPARIN SODIUM 1000 [USP'U]/ML
10000 INJECTION, SOLUTION INTRAVENOUS; SUBCUTANEOUS PRN
Status: DISCONTINUED | OUTPATIENT
Start: 2023-09-28 | End: 2023-10-01

## 2023-09-28 RX ORDER — CYCLOBENZAPRINE HCL 10 MG
10 TABLET ORAL 2 TIMES DAILY
Status: DISCONTINUED | OUTPATIENT
Start: 2023-09-28 | End: 2023-09-29

## 2023-09-28 RX ORDER — ATORVASTATIN CALCIUM 20 MG/1
20 TABLET, FILM COATED ORAL DAILY
Status: DISCONTINUED | OUTPATIENT
Start: 2023-09-29 | End: 2023-10-04 | Stop reason: HOSPADM

## 2023-09-28 RX ORDER — ACETAMINOPHEN 650 MG/1
650 SUPPOSITORY RECTAL EVERY 6 HOURS PRN
Status: DISCONTINUED | OUTPATIENT
Start: 2023-09-28 | End: 2023-10-04 | Stop reason: HOSPADM

## 2023-09-28 RX ORDER — SODIUM CHLORIDE 0.9 % (FLUSH) 0.9 %
5-40 SYRINGE (ML) INJECTION EVERY 12 HOURS SCHEDULED
Status: DISCONTINUED | OUTPATIENT
Start: 2023-09-28 | End: 2023-10-04 | Stop reason: HOSPADM

## 2023-09-28 RX ORDER — ACETAMINOPHEN 500 MG
1000 TABLET ORAL 2 TIMES DAILY
Status: DISCONTINUED | OUTPATIENT
Start: 2023-09-28 | End: 2023-10-04 | Stop reason: HOSPADM

## 2023-09-28 RX ORDER — ENOXAPARIN SODIUM 100 MG/ML
40 INJECTION SUBCUTANEOUS 2 TIMES DAILY
Status: DISCONTINUED | OUTPATIENT
Start: 2023-09-28 | End: 2023-09-28

## 2023-09-28 RX ORDER — HEPARIN SODIUM 1000 [USP'U]/ML
10000 INJECTION, SOLUTION INTRAVENOUS; SUBCUTANEOUS ONCE
Status: COMPLETED | OUTPATIENT
Start: 2023-09-28 | End: 2023-09-28

## 2023-09-28 RX ORDER — IPRATROPIUM BROMIDE AND ALBUTEROL SULFATE 2.5; .5 MG/3ML; MG/3ML
1 SOLUTION RESPIRATORY (INHALATION)
Status: COMPLETED | OUTPATIENT
Start: 2023-09-28 | End: 2023-09-28

## 2023-09-28 RX ORDER — METHOCARBAMOL 500 MG/1
750 TABLET, FILM COATED ORAL 3 TIMES DAILY
Status: DISCONTINUED | OUTPATIENT
Start: 2023-09-28 | End: 2023-10-04 | Stop reason: HOSPADM

## 2023-09-28 RX ORDER — HEPARIN SODIUM 1000 [USP'U]/ML
10000 INJECTION, SOLUTION INTRAVENOUS; SUBCUTANEOUS ONCE
Status: DISCONTINUED | OUTPATIENT
Start: 2023-09-28 | End: 2023-09-28

## 2023-09-28 RX ORDER — CYCLOBENZAPRINE HCL 10 MG
10 TABLET ORAL 3 TIMES DAILY
Status: ON HOLD | COMMUNITY
Start: 2023-09-21 | End: 2023-10-03 | Stop reason: HOSPADM

## 2023-09-28 RX ORDER — HEPARIN SODIUM 10000 [USP'U]/100ML
5-30 INJECTION, SOLUTION INTRAVENOUS CONTINUOUS
Status: DISCONTINUED | OUTPATIENT
Start: 2023-09-28 | End: 2023-09-28

## 2023-09-28 RX ADMIN — HYDROMORPHONE HYDROCHLORIDE 2 MG: 2 TABLET ORAL at 23:04

## 2023-09-28 RX ADMIN — ANASTROZOLE 1 MG: 1 TABLET, COATED ORAL at 16:57

## 2023-09-28 RX ADMIN — ACETAMINOPHEN 650 MG: 325 TABLET ORAL at 16:55

## 2023-09-28 RX ADMIN — HEPARIN SODIUM AND DEXTROSE 11 UNITS/KG/HR: 10000; 5 INJECTION INTRAVENOUS at 21:30

## 2023-09-28 RX ADMIN — METOPROLOL TARTRATE 25 MG: 25 TABLET, FILM COATED ORAL at 17:02

## 2023-09-28 RX ADMIN — METHOCARBAMOL TABLETS 750 MG: 500 TABLET, COATED ORAL at 21:13

## 2023-09-28 RX ADMIN — IOPAMIDOL 100 ML: 755 INJECTION, SOLUTION INTRAVENOUS at 07:29

## 2023-09-28 RX ADMIN — IPRATROPIUM BROMIDE 0.5 MG: 0.5 SOLUTION RESPIRATORY (INHALATION) at 14:10

## 2023-09-28 RX ADMIN — HYDROMORPHONE HYDROCHLORIDE 0.25 MG: 1 INJECTION, SOLUTION INTRAMUSCULAR; INTRAVENOUS; SUBCUTANEOUS at 08:37

## 2023-09-28 RX ADMIN — METHOCARBAMOL TABLETS 750 MG: 500 TABLET, COATED ORAL at 17:02

## 2023-09-28 RX ADMIN — SODIUM CHLORIDE, PRESERVATIVE FREE 5 ML: 5 INJECTION INTRAVENOUS at 21:21

## 2023-09-28 RX ADMIN — IPRATROPIUM BROMIDE 0.5 MG: 0.5 SOLUTION RESPIRATORY (INHALATION) at 20:05

## 2023-09-28 RX ADMIN — ACETAMINOPHEN 1000 MG: 500 TABLET ORAL at 23:08

## 2023-09-28 RX ADMIN — IPRATROPIUM BROMIDE AND ALBUTEROL SULFATE 1 DOSE: 2.5; .5 SOLUTION RESPIRATORY (INHALATION) at 07:30

## 2023-09-28 RX ADMIN — CYCLOBENZAPRINE 10 MG: 10 TABLET, FILM COATED ORAL at 21:14

## 2023-09-28 RX ADMIN — METHOCARBAMOL TABLETS 750 MG: 750 TABLET, COATED ORAL at 08:37

## 2023-09-28 RX ADMIN — INSULIN GLARGINE 15 UNITS: 100 INJECTION, SOLUTION SUBCUTANEOUS at 21:58

## 2023-09-28 RX ADMIN — HEPARIN SODIUM 10000 UNITS: 1000 INJECTION INTRAVENOUS; SUBCUTANEOUS at 08:27

## 2023-09-28 RX ADMIN — HEPARIN SODIUM AND DEXTROSE 12 UNITS/KG/HR: 10000; 5 INJECTION INTRAVENOUS at 08:31

## 2023-09-28 RX ADMIN — ARFORMOTEROL TARTRATE: 15 SOLUTION RESPIRATORY (INHALATION) at 20:11

## 2023-09-28 ASSESSMENT — PAIN SCALES - GENERAL
PAINLEVEL_OUTOF10: 7
PAINLEVEL_OUTOF10: 6
PAINLEVEL_OUTOF10: 7
PAINLEVEL_OUTOF10: 7
PAINLEVEL_OUTOF10: 6
PAINLEVEL_OUTOF10: 6
PAINLEVEL_OUTOF10: 7
PAINLEVEL_OUTOF10: 5

## 2023-09-28 ASSESSMENT — PAIN DESCRIPTION - ORIENTATION
ORIENTATION: RIGHT
ORIENTATION: RIGHT

## 2023-09-28 ASSESSMENT — PAIN DESCRIPTION - LOCATION
LOCATION: BACK;LEG

## 2023-09-28 ASSESSMENT — PAIN - FUNCTIONAL ASSESSMENT: PAIN_FUNCTIONAL_ASSESSMENT: NONE - DENIES PAIN

## 2023-09-28 ASSESSMENT — PAIN DESCRIPTION - DESCRIPTORS
DESCRIPTORS: ACHING
DESCRIPTORS: ACHING

## 2023-09-28 ASSESSMENT — LIFESTYLE VARIABLES
HOW OFTEN DO YOU HAVE A DRINK CONTAINING ALCOHOL: NEVER
HOW MANY STANDARD DRINKS CONTAINING ALCOHOL DO YOU HAVE ON A TYPICAL DAY: PATIENT DOES NOT DRINK

## 2023-09-28 NOTE — ED NOTES
Assumed care of pt at this time. Pt not in room at this time, pt in CT. Pt's head to toe assessment has not been completed. This RN will complete full assessment when pt returns to room.       Lucille Diaz RN  09/28/23 8436

## 2023-09-28 NOTE — H&P
Hospitalist Admission Note    NAME:   Kirstin Richmond   : 1970   MRN: [id number]     Date/Time: 2023 11:31 AM    Patient PCP: Cuba Bocanegra MD    ______________________________________________________________________  Given the patient's current clinical presentation, I have a high level of concern for decompensation if discharged from the emergency department. Complex decision making was performed, which includes reviewing the patient's available past medical records, laboratory results, and x-ray films. My assessment of this patient's clinical condition and my plan of care is as follows. Assessment / Plan:    History of COPD  Pulmonary embolus  CTA chest showed: Right upper lobe embolus extending into segmental branches. . No evidence of right heart strain  start patient on heparin drip  We will get echo of the heart  Nebulizer as needed  Duplex ultrasound to check for DVT      Metastatic breast cancer  CTA chest tube today showed: 2. Ongoing widespread osseous metastatic disease. Several healing bilateral rib  fractures. 3.  Redemonstrated right breast nodules, correlate with any available  mammographic imaging  Consult heme-onc  On anastrozole     CAD  CHF  Continue with aspirin, metoprolol  Hold Lasix for borderline blood pressure as well as spironolactone    Diabetes  Lantus for basal coverage  Lispro sliding scale    HTN  Hold Lasix and spironolactone      Obesity  BMI of 57  Weight loss recommended      Medical Decision Making:   I personally reviewed labs: yes  I personally reviewed imaging:yes  I personally reviewed EKG:  Toxic drug monitoring: yes   Discussed case with: ED provider. After discussion I am in agreement that acuity of patient's medical condition necessitates hospital stay.       Code Status: Full code  DVT Prophylaxis: Heparin drip  GI Prophylaxis:  Baseline:     Subjective:   CHIEF COMPLAINT: Shortness of breath    HISTORY OF PRESENT ILLNESS:     Jessica

## 2023-09-28 NOTE — ED NOTES
Pt inquiring about receiving her home meds. Hospitalist has not yet seen pt at this time.  Sent perfect serve message to Chelsey Padron., MD.     Helena De Oliveira RN  09/28/23 7104

## 2023-09-28 NOTE — ED NOTES
Provided incontinence care to pt at this time, and changed bed sheets.       Marlen Michel RN  09/28/23 1649

## 2023-09-28 NOTE — ED NOTES
Pt back in room at this time. This RN is at bedside. Pt placed on continuous monitoring x3 and on 5L NC.       Luis Hartman RN  09/28/23 4994

## 2023-09-28 NOTE — TELEPHONE ENCOUNTER
Consult:    Name: Aranza Mcbride    : 1970    Provider: Dr. Kalina Gonzalez    Reason: melanoma  breast cancer, presented with pe, and anastrozole    Room: [address]    Phone: [phone]    Nurse: Lynn Avila

## 2023-09-28 NOTE — ED PROVIDER NOTES
[facility] EMERGENCY DEPT  EMERGENCY DEPARTMENT HISTORY AND PHYSICAL EXAM      Date: 2023  Patient Name: Beacher Phoenix  MRN: [id number]  [address] 1970  Date of evaluation: 2023  Provider: Charles Parham MD   Note Started: 6:49 AM EDT 23    HISTORY OF PRESENT ILLNESS     Chief Complaint   Patient presents with    Shortness of Breath     Pt is from home and was recently discharge from rehab on Friday. Pt is c/o SOB, weakness back pain x 2 days and wears oxygen at 5 L @ baseline. History Provided By: Patient    HPI: Beacher Phoenix is a 48 y.o. female past medical history of metastatic breast cancer, coronary artery disease, COPD, CHF, diabetes, obesity presents for evaluation of shortness of breath. Patient has noticed that for the last few days. She patient was discharged from rehab and reports having trouble managing her ADLs at home. No cough or fever. Patient states she has an appointment this morning to get evaluated for radiation to her right hip secondary to metastatic disease.     PAST MEDICAL HISTORY   Past Medical History:  Past Medical History:   Diagnosis Date    Arthritis     Asthma     Breast lump     CAD (coronary artery disease)     Cancer (HCC)     breast cancer    Congestive heart failure (HCC)     COPD (chronic obstructive pulmonary disease) ([address])     Diabetes ([address])     Hypertension     Morbid obesity with BMI of 70 and over, adult [facility])     NSTEMI (non-ST elevated myocardial infarction) ([address]) 2012    NSTEMI (non-ST elevated myocardial infarction) ([address])     SVT (supraventricular tachycardia) ([address])        Past Surgical History:  Past Surgical History:   Procedure Laterality Date     SECTION      ORTHOPEDIC SURGERY      OTHER SURGICAL HISTORY      cyst removed from back    [facility] W LOC DEVICE 1ST LESION RIGHT Right 2018    US BREAST NEEDLE BIOPSY RIGHT 2018 [facility] US       Family

## 2023-09-28 NOTE — ED NOTES
devyn Magallanes at bedside attempting to put a US line in at this time        Kanwal Ann, [place]  09/28/23 4701

## 2023-09-28 NOTE — ED NOTES
Pt has wound in between abdomen and thigh fold on pt's left side, in groin area. Pt states it has been there for about a month which is consistent with the previous documentation for this wound. This RN would describe it as an ulcer.       Marianela Anderson RN  09/28/23 7972

## 2023-09-28 NOTE — ED NOTES
Respiratory at bedside     Mandy Murillo RN  09/28/23 0245 Use naproxen 500 mg twice a day as needed for pain  Take Percocet as directed for pain not controlled by Percocet  Stop taking tramadol while taking Percocet

## 2023-09-28 NOTE — ED NOTES
This RN gave SBAR report to Arnold Hewitt RN at this time. Pt's bed locked & in lowest position and call light w/in reach. Emergency equipment at bedside.         Raad Jason, RN  09/28/23 1161

## 2023-09-28 NOTE — ED NOTES
Per [facility]., Pharmacist. Will change heparin drip to 11 units/kg/hr.       Marianela Corbett RN  09/28/23 3070

## 2023-09-29 ENCOUNTER — APPOINTMENT (OUTPATIENT)
Facility: HOSPITAL | Age: 53
DRG: 176 | End: 2023-09-29
Attending: STUDENT IN AN ORGANIZED HEALTH CARE EDUCATION/TRAINING PROGRAM
Payer: MEDICARE

## 2023-09-29 PROBLEM — R06.02 SHORTNESS OF BREATH: Status: ACTIVE | Noted: 2023-09-29

## 2023-09-29 PROBLEM — Z71.89 GOALS OF CARE, COUNSELING/DISCUSSION: Status: ACTIVE | Noted: 2023-09-29

## 2023-09-29 PROBLEM — R53.1 GENERALIZED WEAKNESS: Status: ACTIVE | Noted: 2023-09-29

## 2023-09-29 PROBLEM — Z91.89 AT RISK FOR CONSTIPATION: Status: ACTIVE | Noted: 2023-09-29

## 2023-09-29 LAB
ANION GAP SERPL CALC-SCNC: 7 MMOL/L (ref 5–15)
BASOPHILS # BLD: 0 K/UL (ref 0–0.1)
BASOPHILS NFR BLD: 1 % (ref 0–1)
BUN SERPL-MCNC: 12 MG/DL (ref 6–20)
BUN/CREAT SERPL: 16 (ref 12–20)
CALCIUM SERPL-MCNC: 11.6 MG/DL (ref 8.5–10.1)
CHLORIDE SERPL-SCNC: 105 MMOL/L (ref 97–108)
CO2 SERPL-SCNC: 27 MMOL/L (ref 21–32)
CREAT SERPL-MCNC: 0.73 MG/DL (ref 0.55–1.02)
DIFFERENTIAL METHOD BLD: ABNORMAL
EOSINOPHIL # BLD: 0.2 K/UL (ref 0–0.4)
EOSINOPHIL NFR BLD: 3 % (ref 0–7)
ERYTHROCYTE [DISTWIDTH] IN BLOOD BY AUTOMATED COUNT: 12.9 % (ref 11.5–14.5)
GLUCOSE BLD STRIP.AUTO-MCNC: 153 MG/DL (ref 65–117)
GLUCOSE BLD STRIP.AUTO-MCNC: 170 MG/DL (ref 65–117)
GLUCOSE BLD STRIP.AUTO-MCNC: 176 MG/DL (ref 65–117)
GLUCOSE BLD STRIP.AUTO-MCNC: 208 MG/DL (ref 65–117)
GLUCOSE SERPL-MCNC: 154 MG/DL (ref 65–100)
HCT VFR BLD AUTO: 43.6 % (ref 35–47)
HGB BLD-MCNC: 13.5 G/DL (ref 11.5–16)
IMM GRANULOCYTES # BLD AUTO: 0.1 K/UL (ref 0–0.04)
IMM GRANULOCYTES NFR BLD AUTO: 2 % (ref 0–0.5)
LYMPHOCYTES # BLD: 0.9 K/UL (ref 0.8–3.5)
LYMPHOCYTES NFR BLD: 14 % (ref 12–49)
MCH RBC QN AUTO: 30.3 PG (ref 26–34)
MCHC RBC AUTO-ENTMCNC: 31 G/DL (ref 30–36.5)
MCV RBC AUTO: 97.8 FL (ref 80–99)
MONOCYTES # BLD: 1.2 K/UL (ref 0–1)
MONOCYTES NFR BLD: 18 % (ref 5–13)
NEUTS SEG # BLD: 4 K/UL (ref 1.8–8)
NEUTS SEG NFR BLD: 62 % (ref 32–75)
NRBC # BLD: 0 K/UL (ref 0–0.01)
NRBC BLD-RTO: 0 PER 100 WBC
PLATELET # BLD AUTO: 121 K/UL (ref 150–400)
PMV BLD AUTO: 10.5 FL (ref 8.9–12.9)
POTASSIUM SERPL-SCNC: 3.7 MMOL/L (ref 3.5–5.1)
RBC # BLD AUTO: 4.46 M/UL (ref 3.8–5.2)
SERVICE CMNT-IMP: ABNORMAL
SODIUM SERPL-SCNC: 139 MMOL/L (ref 136–145)
UFH PPP CHRO-ACNC: 0.49 IU/ML
WBC # BLD AUTO: 6.5 K/UL (ref 3.6–11)

## 2023-09-29 PROCEDURE — 6360000002 HC RX W HCPCS: Performed by: EMERGENCY MEDICINE

## 2023-09-29 PROCEDURE — 85025 COMPLETE CBC W/AUTO DIFF WBC: CPT

## 2023-09-29 PROCEDURE — 2580000003 HC RX 258: Performed by: STUDENT IN AN ORGANIZED HEALTH CARE EDUCATION/TRAINING PROGRAM

## 2023-09-29 PROCEDURE — 2060000000 HC ICU INTERMEDIATE R&B

## 2023-09-29 PROCEDURE — 97530 THERAPEUTIC ACTIVITIES: CPT | Performed by: OCCUPATIONAL THERAPIST

## 2023-09-29 PROCEDURE — 6360000002 HC RX W HCPCS: Performed by: STUDENT IN AN ORGANIZED HEALTH CARE EDUCATION/TRAINING PROGRAM

## 2023-09-29 PROCEDURE — 6370000000 HC RX 637 (ALT 250 FOR IP): Performed by: FAMILY MEDICINE

## 2023-09-29 PROCEDURE — 97165 OT EVAL LOW COMPLEX 30 MIN: CPT | Performed by: OCCUPATIONAL THERAPIST

## 2023-09-29 PROCEDURE — 6370000000 HC RX 637 (ALT 250 FOR IP): Performed by: STUDENT IN AN ORGANIZED HEALTH CARE EDUCATION/TRAINING PROGRAM

## 2023-09-29 PROCEDURE — 6370000000 HC RX 637 (ALT 250 FOR IP)

## 2023-09-29 PROCEDURE — 94640 AIRWAY INHALATION TREATMENT: CPT

## 2023-09-29 PROCEDURE — 36415 COLL VENOUS BLD VENIPUNCTURE: CPT

## 2023-09-29 PROCEDURE — 97162 PT EVAL MOD COMPLEX 30 MIN: CPT

## 2023-09-29 PROCEDURE — 99221 1ST HOSP IP/OBS SF/LOW 40: CPT

## 2023-09-29 PROCEDURE — 80048 BASIC METABOLIC PNL TOTAL CA: CPT

## 2023-09-29 PROCEDURE — 99222 1ST HOSP IP/OBS MODERATE 55: CPT | Performed by: FAMILY MEDICINE

## 2023-09-29 PROCEDURE — 82962 GLUCOSE BLOOD TEST: CPT

## 2023-09-29 PROCEDURE — 97530 THERAPEUTIC ACTIVITIES: CPT

## 2023-09-29 PROCEDURE — 2700000000 HC OXYGEN THERAPY PER DAY

## 2023-09-29 PROCEDURE — 85520 HEPARIN ASSAY: CPT

## 2023-09-29 PROCEDURE — 2500000003 HC RX 250 WO HCPCS: Performed by: INTERNAL MEDICINE

## 2023-09-29 RX ORDER — SENNOSIDES A AND B 8.6 MG/1
2 TABLET, FILM COATED ORAL NIGHTLY
Status: DISCONTINUED | OUTPATIENT
Start: 2023-09-29 | End: 2023-10-04 | Stop reason: HOSPADM

## 2023-09-29 RX ORDER — CYCLOBENZAPRINE HCL 10 MG
5 TABLET ORAL 3 TIMES DAILY
Status: DISCONTINUED | OUTPATIENT
Start: 2023-09-29 | End: 2023-10-04 | Stop reason: HOSPADM

## 2023-09-29 RX ORDER — INSULIN GLARGINE 100 [IU]/ML
18 INJECTION, SOLUTION SUBCUTANEOUS NIGHTLY
Status: DISCONTINUED | OUTPATIENT
Start: 2023-09-29 | End: 2023-10-02

## 2023-09-29 RX ADMIN — CYCLOBENZAPRINE 10 MG: 10 TABLET, FILM COATED ORAL at 09:08

## 2023-09-29 RX ADMIN — INSULIN GLARGINE 18 UNITS: 100 INJECTION, SOLUTION SUBCUTANEOUS at 21:56

## 2023-09-29 RX ADMIN — SENNOSIDES 17.2 MG: 8.6 TABLET, FILM COATED ORAL at 21:59

## 2023-09-29 RX ADMIN — IPRATROPIUM BROMIDE 0.5 MG: 0.5 SOLUTION RESPIRATORY (INHALATION) at 19:10

## 2023-09-29 RX ADMIN — SODIUM CHLORIDE, PRESERVATIVE FREE 10 ML: 5 INJECTION INTRAVENOUS at 22:00

## 2023-09-29 RX ADMIN — ASPIRIN 81 MG: 81 TABLET, CHEWABLE ORAL at 09:08

## 2023-09-29 RX ADMIN — HEPARIN SODIUM AND DEXTROSE 11 UNITS/KG/HR: 10000; 5 INJECTION INTRAVENOUS at 23:52

## 2023-09-29 RX ADMIN — Medication: at 09:14

## 2023-09-29 RX ADMIN — CYCLOBENZAPRINE 5 MG: 10 TABLET, FILM COATED ORAL at 21:52

## 2023-09-29 RX ADMIN — HYDROMORPHONE HYDROCHLORIDE 2 MG: 2 TABLET ORAL at 04:12

## 2023-09-29 RX ADMIN — IPRATROPIUM BROMIDE 0.5 MG: 0.5 SOLUTION RESPIRATORY (INHALATION) at 07:45

## 2023-09-29 RX ADMIN — HYDROMORPHONE HYDROCHLORIDE 2 MG: 2 TABLET ORAL at 21:49

## 2023-09-29 RX ADMIN — HYDROMORPHONE HYDROCHLORIDE 2 MG: 2 TABLET ORAL at 16:45

## 2023-09-29 RX ADMIN — HYDROMORPHONE HYDROCHLORIDE 2 MG: 2 TABLET ORAL at 09:12

## 2023-09-29 RX ADMIN — METOPROLOL TARTRATE 25 MG: 25 TABLET, FILM COATED ORAL at 09:11

## 2023-09-29 RX ADMIN — INSULIN LISPRO 2 UNITS: 100 INJECTION, SOLUTION INTRAVENOUS; SUBCUTANEOUS at 14:50

## 2023-09-29 RX ADMIN — ACETAMINOPHEN 1000 MG: 500 TABLET ORAL at 09:09

## 2023-09-29 RX ADMIN — METOPROLOL TARTRATE 25 MG: 25 TABLET, FILM COATED ORAL at 22:00

## 2023-09-29 RX ADMIN — MICONAZOLE NITRATE: 2 POWDER TOPICAL at 21:58

## 2023-09-29 RX ADMIN — HEPARIN SODIUM AND DEXTROSE 11 UNITS/KG/HR: 10000; 5 INJECTION INTRAVENOUS at 10:54

## 2023-09-29 RX ADMIN — ARFORMOTEROL TARTRATE: 15 SOLUTION RESPIRATORY (INHALATION) at 19:15

## 2023-09-29 RX ADMIN — ANASTROZOLE 1 MG: 1 TABLET, COATED ORAL at 09:12

## 2023-09-29 RX ADMIN — SODIUM CHLORIDE, PRESERVATIVE FREE 10 ML: 5 INJECTION INTRAVENOUS at 09:13

## 2023-09-29 RX ADMIN — METHOCARBAMOL TABLETS 750 MG: 500 TABLET, COATED ORAL at 14:52

## 2023-09-29 RX ADMIN — METHOCARBAMOL TABLETS 750 MG: 500 TABLET, COATED ORAL at 21:54

## 2023-09-29 RX ADMIN — Medication: at 21:48

## 2023-09-29 RX ADMIN — METHOCARBAMOL TABLETS 750 MG: 500 TABLET, COATED ORAL at 09:11

## 2023-09-29 RX ADMIN — ATORVASTATIN CALCIUM 20 MG: 20 TABLET, FILM COATED ORAL at 09:08

## 2023-09-29 RX ADMIN — IPRATROPIUM BROMIDE 0.5 MG: 0.5 SOLUTION RESPIRATORY (INHALATION) at 16:42

## 2023-09-29 RX ADMIN — MICONAZOLE NITRATE: 2 POWDER TOPICAL at 15:30

## 2023-09-29 RX ADMIN — ARFORMOTEROL TARTRATE: 15 SOLUTION RESPIRATORY (INHALATION) at 07:53

## 2023-09-29 ASSESSMENT — PAIN DESCRIPTION - LOCATION
LOCATION: LEG
LOCATION: BACK;LEG
LOCATION: BACK;HIP
LOCATION: LEG
LOCATION: BACK

## 2023-09-29 ASSESSMENT — PAIN SCALES - GENERAL
PAINLEVEL_OUTOF10: 8
PAINLEVEL_OUTOF10: 6
PAINLEVEL_OUTOF10: 4
PAINLEVEL_OUTOF10: 1
PAINLEVEL_OUTOF10: 5
PAINLEVEL_OUTOF10: 7
PAINLEVEL_OUTOF10: 0
PAINLEVEL_OUTOF10: 7
PAINLEVEL_OUTOF10: 6
PAINLEVEL_OUTOF10: 4

## 2023-09-29 ASSESSMENT — PAIN DESCRIPTION - FREQUENCY: FREQUENCY: INTERMITTENT

## 2023-09-29 ASSESSMENT — PAIN DESCRIPTION - ORIENTATION
ORIENTATION: LEFT
ORIENTATION: RIGHT;LEFT
ORIENTATION: LEFT

## 2023-09-29 ASSESSMENT — PAIN - FUNCTIONAL ASSESSMENT: PAIN_FUNCTIONAL_ASSESSMENT: INTOLERABLE, UNABLE TO DO ANY ACTIVE OR PASSIVE ACTIVITIES

## 2023-09-29 ASSESSMENT — PAIN DESCRIPTION - ONSET: ONSET: GRADUAL

## 2023-09-29 ASSESSMENT — PAIN DESCRIPTION - DESCRIPTORS: DESCRIPTORS: ACHING

## 2023-09-29 ASSESSMENT — PAIN DESCRIPTION - PAIN TYPE: TYPE: ACUTE PAIN;CHRONIC PAIN

## 2023-09-29 NOTE — CONSULTS
Nebulization BID RT    anastrozole (ARIMIDEX) tablet 1 mg  1 mg Oral Daily    acetaminophen (TYLENOL) tablet 1,000 mg  1,000 mg Oral BID    methocarbamol (ROBAXIN) tablet 750 mg  750 mg Oral TID    metoprolol (LOPRESSOR) injection 5 mg  5 mg IntraVENous Q6H PRN    lidocaine 4 % external patch 1 patch  1 patch TransDERmal Q24H    balsum peru-castor oil (VENELEX) ointment   Topical BID    glucose chewable tablet 16 g  4 tablet Oral PRN    dextrose bolus 10% 125 mL  125 mL IntraVENous PRN    Or    dextrose bolus 10% 250 mL  250 mL IntraVENous PRN    glucagon injection 1 mg  1 mg SubCUTAneous PRN    dextrose 10 % infusion   IntraVENous Continuous PRN    melatonin tablet 3 mg  3 mg Oral Nightly PRN    naloxone (NARCAN) injection 0.4 mg  0.4 mg IntraVENous PRN    HYDROmorphone (DILAUDID) tablet 2 mg  2 mg Oral Q4H PRN          LAB AND IMAGING FINDINGS:     Lab Results   Component Value Date/Time    WBC 6.5 09/29/2023 02:12 AM    HGB 13.5 09/29/2023 02:12 AM     09/29/2023 02:12 AM     Lab Results   Component Value Date/Time     09/29/2023 02:12 AM    K 3.7 09/29/2023 02:12 AM     09/29/2023 02:12 AM    CO2 27 09/29/2023 02:12 AM    BUN 12 09/29/2023 02:12 AM    MG 2.1 08/28/2023 03:56 AM    PHOS 2.2 04/10/2022 02:39 AM      Lab Results   Component Value Date/Time    GLOB 4.1 09/28/2023 05:37 AM     Lab Results   Component Value Date/Time    INR 1.1 09/28/2023 08:02 AM    APTT 20.5 09/28/2023 08:02 AM      No results found for: \"IRON\", \"TIBC\", \"IBCT\", \"FERR\"   Lab Results   Component Value Date/Time    PH 7.29 02/18/2021 10:29 PM    PCO2 66 02/18/2021 10:29 PM    PO2 78 02/18/2021 10:29 PM     No components found for: \"GLPOC\"   Lab Results   Component Value Date/Time    CKMB 1.5 08/17/2020 12:45 AM    TROPONINI <0.05 02/19/2021 03:41 AM    TROPONINI <0.04 12/06/2019 05:56 PM              Only check if applicable and billing time based rather than MDM    [x]   The total encounter time on this service date

## 2023-09-30 ENCOUNTER — APPOINTMENT (OUTPATIENT)
Facility: HOSPITAL | Age: 53
DRG: 176 | End: 2023-09-30
Attending: STUDENT IN AN ORGANIZED HEALTH CARE EDUCATION/TRAINING PROGRAM
Payer: MEDICARE

## 2023-09-30 LAB
BACTERIA SPEC CULT: ABNORMAL
BACTERIA SPEC CULT: ABNORMAL
ERYTHROCYTE [DISTWIDTH] IN BLOOD BY AUTOMATED COUNT: 12.9 % (ref 11.5–14.5)
GLUCOSE BLD STRIP.AUTO-MCNC: 154 MG/DL (ref 65–117)
GLUCOSE BLD STRIP.AUTO-MCNC: 163 MG/DL (ref 65–117)
GLUCOSE BLD STRIP.AUTO-MCNC: 179 MG/DL (ref 65–117)
GLUCOSE BLD STRIP.AUTO-MCNC: 194 MG/DL (ref 65–117)
HCT VFR BLD AUTO: 40.7 % (ref 35–47)
HGB BLD-MCNC: 12.5 G/DL (ref 11.5–16)
MCH RBC QN AUTO: 30.5 PG (ref 26–34)
MCHC RBC AUTO-ENTMCNC: 30.7 G/DL (ref 30–36.5)
MCV RBC AUTO: 99.3 FL (ref 80–99)
NRBC # BLD: 0 K/UL (ref 0–0.01)
NRBC BLD-RTO: 0 PER 100 WBC
PLATELET # BLD AUTO: 124 K/UL (ref 150–400)
PMV BLD AUTO: 10.7 FL (ref 8.9–12.9)
RBC # BLD AUTO: 4.1 M/UL (ref 3.8–5.2)
SERVICE CMNT-IMP: ABNORMAL
UFH PPP CHRO-ACNC: 0.42 IU/ML
WBC # BLD AUTO: 5.8 K/UL (ref 3.6–11)

## 2023-09-30 PROCEDURE — 6370000000 HC RX 637 (ALT 250 FOR IP): Performed by: STUDENT IN AN ORGANIZED HEALTH CARE EDUCATION/TRAINING PROGRAM

## 2023-09-30 PROCEDURE — 36415 COLL VENOUS BLD VENIPUNCTURE: CPT

## 2023-09-30 PROCEDURE — 82962 GLUCOSE BLOOD TEST: CPT

## 2023-09-30 PROCEDURE — 94640 AIRWAY INHALATION TREATMENT: CPT

## 2023-09-30 PROCEDURE — 85520 HEPARIN ASSAY: CPT

## 2023-09-30 PROCEDURE — 6360000002 HC RX W HCPCS: Performed by: INTERNAL MEDICINE

## 2023-09-30 PROCEDURE — 85027 COMPLETE CBC AUTOMATED: CPT

## 2023-09-30 PROCEDURE — 2700000000 HC OXYGEN THERAPY PER DAY

## 2023-09-30 PROCEDURE — 6370000000 HC RX 637 (ALT 250 FOR IP): Performed by: INTERNAL MEDICINE

## 2023-09-30 PROCEDURE — 2060000000 HC ICU INTERMEDIATE R&B

## 2023-09-30 PROCEDURE — 6360000002 HC RX W HCPCS: Performed by: STUDENT IN AN ORGANIZED HEALTH CARE EDUCATION/TRAINING PROGRAM

## 2023-09-30 PROCEDURE — 6360000004 HC RX CONTRAST MEDICATION: Performed by: INTERNAL MEDICINE

## 2023-09-30 PROCEDURE — 6370000000 HC RX 637 (ALT 250 FOR IP): Performed by: FAMILY MEDICINE

## 2023-09-30 PROCEDURE — C8929 TTE W OR WO FOL WCON,DOPPLER: HCPCS

## 2023-09-30 PROCEDURE — 6370000000 HC RX 637 (ALT 250 FOR IP)

## 2023-09-30 PROCEDURE — 2580000003 HC RX 258: Performed by: STUDENT IN AN ORGANIZED HEALTH CARE EDUCATION/TRAINING PROGRAM

## 2023-09-30 RX ORDER — GUAIFENESIN/DEXTROMETHORPHAN 100-10MG/5
5 SYRUP ORAL EVERY 4 HOURS PRN
Status: DISCONTINUED | OUTPATIENT
Start: 2023-09-30 | End: 2023-10-04 | Stop reason: HOSPADM

## 2023-09-30 RX ORDER — GUAIFENESIN 600 MG/1
600 TABLET, EXTENDED RELEASE ORAL 2 TIMES DAILY
Status: DISCONTINUED | OUTPATIENT
Start: 2023-09-30 | End: 2023-10-04 | Stop reason: HOSPADM

## 2023-09-30 RX ADMIN — HYDROMORPHONE HYDROCHLORIDE 2 MG: 2 TABLET ORAL at 21:28

## 2023-09-30 RX ADMIN — INSULIN GLARGINE 18 UNITS: 100 INJECTION, SOLUTION SUBCUTANEOUS at 21:22

## 2023-09-30 RX ADMIN — IPRATROPIUM BROMIDE 0.5 MG: 0.5 SOLUTION RESPIRATORY (INHALATION) at 20:02

## 2023-09-30 RX ADMIN — CYCLOBENZAPRINE 5 MG: 10 TABLET, FILM COATED ORAL at 10:31

## 2023-09-30 RX ADMIN — METOPROLOL TARTRATE 25 MG: 25 TABLET, FILM COATED ORAL at 10:33

## 2023-09-30 RX ADMIN — SENNOSIDES 17.2 MG: 8.6 TABLET, FILM COATED ORAL at 21:21

## 2023-09-30 RX ADMIN — SODIUM CHLORIDE, PRESERVATIVE FREE 10 ML: 5 INJECTION INTRAVENOUS at 10:33

## 2023-09-30 RX ADMIN — CYCLOBENZAPRINE 5 MG: 10 TABLET, FILM COATED ORAL at 21:20

## 2023-09-30 RX ADMIN — METHOCARBAMOL TABLETS 750 MG: 500 TABLET, COATED ORAL at 10:33

## 2023-09-30 RX ADMIN — PERFLUTREN 1.5 ML: 6.52 INJECTION, SUSPENSION INTRAVENOUS at 15:26

## 2023-09-30 RX ADMIN — MICONAZOLE NITRATE: 2 POWDER TOPICAL at 21:24

## 2023-09-30 RX ADMIN — FLUTICASONE PROPIONATE 2 SPRAY: 50 SPRAY, METERED NASAL at 10:35

## 2023-09-30 RX ADMIN — ACETAMINOPHEN 650 MG: 325 TABLET ORAL at 06:34

## 2023-09-30 RX ADMIN — GUAIFENESIN 600 MG: 600 TABLET, EXTENDED RELEASE ORAL at 21:21

## 2023-09-30 RX ADMIN — SODIUM CHLORIDE, PRESERVATIVE FREE 10 ML: 5 INJECTION INTRAVENOUS at 21:23

## 2023-09-30 RX ADMIN — METHOCARBAMOL TABLETS 750 MG: 500 TABLET, COATED ORAL at 14:36

## 2023-09-30 RX ADMIN — HYDROMORPHONE HYDROCHLORIDE 2 MG: 2 TABLET ORAL at 02:51

## 2023-09-30 RX ADMIN — APIXABAN 10 MG: 5 TABLET, FILM COATED ORAL at 21:20

## 2023-09-30 RX ADMIN — CYCLOBENZAPRINE 5 MG: 10 TABLET, FILM COATED ORAL at 14:35

## 2023-09-30 RX ADMIN — ASPIRIN 81 MG: 81 TABLET, CHEWABLE ORAL at 10:33

## 2023-09-30 RX ADMIN — HYDROMORPHONE HYDROCHLORIDE 2 MG: 2 TABLET ORAL at 10:31

## 2023-09-30 RX ADMIN — ACETAMINOPHEN 1000 MG: 500 TABLET ORAL at 10:32

## 2023-09-30 RX ADMIN — IPRATROPIUM BROMIDE 0.5 MG: 0.5 SOLUTION RESPIRATORY (INHALATION) at 07:05

## 2023-09-30 RX ADMIN — GUAIFENESIN 600 MG: 600 TABLET, EXTENDED RELEASE ORAL at 10:32

## 2023-09-30 RX ADMIN — Medication: at 10:40

## 2023-09-30 RX ADMIN — APIXABAN 10 MG: 5 TABLET, FILM COATED ORAL at 10:31

## 2023-09-30 RX ADMIN — ATORVASTATIN CALCIUM 20 MG: 20 TABLET, FILM COATED ORAL at 10:32

## 2023-09-30 RX ADMIN — Medication: at 21:22

## 2023-09-30 RX ADMIN — ARFORMOTEROL TARTRATE: 15 SOLUTION RESPIRATORY (INHALATION) at 07:11

## 2023-09-30 RX ADMIN — METOPROLOL TARTRATE 25 MG: 25 TABLET, FILM COATED ORAL at 21:21

## 2023-09-30 RX ADMIN — ARFORMOTEROL TARTRATE: 15 SOLUTION RESPIRATORY (INHALATION) at 20:01

## 2023-09-30 RX ADMIN — HYDROMORPHONE HYDROCHLORIDE 2 MG: 2 TABLET ORAL at 14:36

## 2023-09-30 RX ADMIN — MICONAZOLE NITRATE: 2 POWDER TOPICAL at 10:39

## 2023-09-30 RX ADMIN — ANASTROZOLE 1 MG: 1 TABLET, COATED ORAL at 10:33

## 2023-09-30 ASSESSMENT — PAIN DESCRIPTION - ORIENTATION
ORIENTATION: LOWER
ORIENTATION: LOWER;MID

## 2023-09-30 ASSESSMENT — PAIN SCALES - GENERAL
PAINLEVEL_OUTOF10: 7
PAINLEVEL_OUTOF10: 6
PAINLEVEL_OUTOF10: 2
PAINLEVEL_OUTOF10: 6

## 2023-09-30 ASSESSMENT — PAIN DESCRIPTION - LOCATION
LOCATION: BACK

## 2023-09-30 ASSESSMENT — PAIN DESCRIPTION - DESCRIPTORS
DESCRIPTORS: ACHING;PRESSURE
DESCRIPTORS: ACHING;DISCOMFORT

## 2023-10-01 LAB
GLUCOSE BLD STRIP.AUTO-MCNC: 163 MG/DL (ref 65–117)
GLUCOSE BLD STRIP.AUTO-MCNC: 169 MG/DL (ref 65–117)
GLUCOSE BLD STRIP.AUTO-MCNC: 182 MG/DL (ref 65–117)
GLUCOSE BLD STRIP.AUTO-MCNC: 224 MG/DL (ref 65–117)
SERVICE CMNT-IMP: ABNORMAL

## 2023-10-01 PROCEDURE — 2700000000 HC OXYGEN THERAPY PER DAY

## 2023-10-01 PROCEDURE — 6370000000 HC RX 637 (ALT 250 FOR IP): Performed by: INTERNAL MEDICINE

## 2023-10-01 PROCEDURE — 94640 AIRWAY INHALATION TREATMENT: CPT

## 2023-10-01 PROCEDURE — 82962 GLUCOSE BLOOD TEST: CPT

## 2023-10-01 PROCEDURE — 6370000000 HC RX 637 (ALT 250 FOR IP): Performed by: FAMILY MEDICINE

## 2023-10-01 PROCEDURE — 6370000000 HC RX 637 (ALT 250 FOR IP)

## 2023-10-01 PROCEDURE — 6370000000 HC RX 637 (ALT 250 FOR IP): Performed by: STUDENT IN AN ORGANIZED HEALTH CARE EDUCATION/TRAINING PROGRAM

## 2023-10-01 PROCEDURE — 2580000003 HC RX 258: Performed by: STUDENT IN AN ORGANIZED HEALTH CARE EDUCATION/TRAINING PROGRAM

## 2023-10-01 PROCEDURE — 2060000000 HC ICU INTERMEDIATE R&B

## 2023-10-01 PROCEDURE — 6360000002 HC RX W HCPCS: Performed by: INTERNAL MEDICINE

## 2023-10-01 RX ORDER — MECLIZINE HCL 12.5 MG/1
12.5 TABLET ORAL 3 TIMES DAILY
Status: DISCONTINUED | OUTPATIENT
Start: 2023-10-01 | End: 2023-10-04 | Stop reason: HOSPADM

## 2023-10-01 RX ADMIN — GUAIFENESIN 600 MG: 600 TABLET, EXTENDED RELEASE ORAL at 20:36

## 2023-10-01 RX ADMIN — METOPROLOL TARTRATE 25 MG: 25 TABLET, FILM COATED ORAL at 09:02

## 2023-10-01 RX ADMIN — ACETAMINOPHEN 1000 MG: 500 TABLET ORAL at 16:32

## 2023-10-01 RX ADMIN — SENNOSIDES 17.2 MG: 8.6 TABLET, FILM COATED ORAL at 20:50

## 2023-10-01 RX ADMIN — METHOCARBAMOL TABLETS 750 MG: 500 TABLET, COATED ORAL at 09:03

## 2023-10-01 RX ADMIN — ANASTROZOLE 1 MG: 1 TABLET, COATED ORAL at 09:02

## 2023-10-01 RX ADMIN — METOPROLOL TARTRATE 25 MG: 25 TABLET, FILM COATED ORAL at 20:36

## 2023-10-01 RX ADMIN — MECLIZINE 12.5 MG: 12.5 TABLET ORAL at 13:19

## 2023-10-01 RX ADMIN — HYDROMORPHONE HYDROCHLORIDE 2 MG: 2 TABLET ORAL at 20:35

## 2023-10-01 RX ADMIN — METHOCARBAMOL TABLETS 750 MG: 500 TABLET, COATED ORAL at 01:28

## 2023-10-01 RX ADMIN — ATORVASTATIN CALCIUM 20 MG: 20 TABLET, FILM COATED ORAL at 09:02

## 2023-10-01 RX ADMIN — APIXABAN 10 MG: 5 TABLET, FILM COATED ORAL at 09:03

## 2023-10-01 RX ADMIN — ARFORMOTEROL TARTRATE: 15 SOLUTION RESPIRATORY (INHALATION) at 07:07

## 2023-10-01 RX ADMIN — SODIUM CHLORIDE, PRESERVATIVE FREE 10 ML: 5 INJECTION INTRAVENOUS at 20:42

## 2023-10-01 RX ADMIN — CYCLOBENZAPRINE 5 MG: 10 TABLET, FILM COATED ORAL at 09:02

## 2023-10-01 RX ADMIN — MECLIZINE 12.5 MG: 12.5 TABLET ORAL at 20:37

## 2023-10-01 RX ADMIN — CYCLOBENZAPRINE 5 MG: 10 TABLET, FILM COATED ORAL at 20:33

## 2023-10-01 RX ADMIN — ACETAMINOPHEN 1000 MG: 500 TABLET ORAL at 01:26

## 2023-10-01 RX ADMIN — CYCLOBENZAPRINE 5 MG: 10 TABLET, FILM COATED ORAL at 16:45

## 2023-10-01 RX ADMIN — MICONAZOLE NITRATE: 2 POWDER TOPICAL at 20:41

## 2023-10-01 RX ADMIN — SODIUM CHLORIDE, PRESERVATIVE FREE 10 ML: 5 INJECTION INTRAVENOUS at 09:11

## 2023-10-01 RX ADMIN — INSULIN GLARGINE 18 UNITS: 100 INJECTION, SOLUTION SUBCUTANEOUS at 20:50

## 2023-10-01 RX ADMIN — MICONAZOLE NITRATE: 2 POWDER TOPICAL at 09:10

## 2023-10-01 RX ADMIN — Medication: at 09:09

## 2023-10-01 RX ADMIN — METHOCARBAMOL TABLETS 750 MG: 500 TABLET, COATED ORAL at 13:19

## 2023-10-01 RX ADMIN — HYDROMORPHONE HYDROCHLORIDE 2 MG: 2 TABLET ORAL at 01:32

## 2023-10-01 RX ADMIN — APIXABAN 10 MG: 5 TABLET, FILM COATED ORAL at 20:33

## 2023-10-01 RX ADMIN — METHOCARBAMOL TABLETS 750 MG: 500 TABLET, COATED ORAL at 20:37

## 2023-10-01 RX ADMIN — IPRATROPIUM BROMIDE 0.5 MG: 0.5 SOLUTION RESPIRATORY (INHALATION) at 07:01

## 2023-10-01 RX ADMIN — HYDROMORPHONE HYDROCHLORIDE 2 MG: 2 TABLET ORAL at 09:02

## 2023-10-01 RX ADMIN — ASPIRIN 81 MG: 81 TABLET, CHEWABLE ORAL at 09:02

## 2023-10-01 RX ADMIN — GUAIFENESIN 600 MG: 600 TABLET, EXTENDED RELEASE ORAL at 09:02

## 2023-10-01 RX ADMIN — FLUTICASONE PROPIONATE 2 SPRAY: 50 SPRAY, METERED NASAL at 09:08

## 2023-10-01 RX ADMIN — HYDROMORPHONE HYDROCHLORIDE 2 MG: 2 TABLET ORAL at 13:19

## 2023-10-01 RX ADMIN — Medication: at 20:39

## 2023-10-01 ASSESSMENT — PAIN DESCRIPTION - LOCATION
LOCATION: BACK;LEG
LOCATION: BACK
LOCATION: BACK;LEG
LOCATION: BACK;LEG

## 2023-10-01 ASSESSMENT — PAIN SCALES - GENERAL
PAINLEVEL_OUTOF10: 2
PAINLEVEL_OUTOF10: 2
PAINLEVEL_OUTOF10: 7

## 2023-10-01 ASSESSMENT — PAIN DESCRIPTION - DESCRIPTORS: DESCRIPTORS: SHOOTING

## 2023-10-02 LAB
ECHO AV PEAK GRADIENT: 8 MMHG
ECHO AV PEAK VELOCITY: 1.4 M/S
ECHO AV VELOCITY RATIO: 0.64
ECHO BSA: 2.75 M2
ECHO LV E' SEPTAL VELOCITY: 4 CM/S
ECHO LVOT PEAK GRADIENT: 3 MMHG
ECHO LVOT PEAK VELOCITY: 0.9 M/S
ECHO MV A VELOCITY: 0.76 M/S
ECHO MV E DECELERATION TIME (DT): 114.6 MS
ECHO MV E VELOCITY: 0.56 M/S
ECHO MV E/A RATIO: 0.74
ECHO MV E/E' SEPTAL: 14
GLUCOSE BLD STRIP.AUTO-MCNC: 190 MG/DL (ref 65–117)
GLUCOSE BLD STRIP.AUTO-MCNC: 212 MG/DL (ref 65–117)
GLUCOSE BLD STRIP.AUTO-MCNC: 235 MG/DL (ref 65–117)
GLUCOSE BLD STRIP.AUTO-MCNC: 270 MG/DL (ref 65–117)
SERVICE CMNT-IMP: ABNORMAL

## 2023-10-02 PROCEDURE — 6370000000 HC RX 637 (ALT 250 FOR IP): Performed by: STUDENT IN AN ORGANIZED HEALTH CARE EDUCATION/TRAINING PROGRAM

## 2023-10-02 PROCEDURE — 99231 SBSQ HOSP IP/OBS SF/LOW 25: CPT

## 2023-10-02 PROCEDURE — 82962 GLUCOSE BLOOD TEST: CPT

## 2023-10-02 PROCEDURE — 6370000000 HC RX 637 (ALT 250 FOR IP): Performed by: INTERNAL MEDICINE

## 2023-10-02 PROCEDURE — 6370000000 HC RX 637 (ALT 250 FOR IP): Performed by: FAMILY MEDICINE

## 2023-10-02 PROCEDURE — 94640 AIRWAY INHALATION TREATMENT: CPT

## 2023-10-02 PROCEDURE — 2580000003 HC RX 258: Performed by: STUDENT IN AN ORGANIZED HEALTH CARE EDUCATION/TRAINING PROGRAM

## 2023-10-02 PROCEDURE — 2060000000 HC ICU INTERMEDIATE R&B

## 2023-10-02 PROCEDURE — 6370000000 HC RX 637 (ALT 250 FOR IP)

## 2023-10-02 PROCEDURE — 6360000002 HC RX W HCPCS: Performed by: INTERNAL MEDICINE

## 2023-10-02 RX ORDER — INSULIN GLARGINE 100 [IU]/ML
20 INJECTION, SOLUTION SUBCUTANEOUS NIGHTLY
Status: DISCONTINUED | OUTPATIENT
Start: 2023-10-02 | End: 2023-10-03

## 2023-10-02 RX ORDER — GUAIFENESIN/DEXTROMETHORPHAN 100-10MG/5
5 SYRUP ORAL EVERY 4 HOURS PRN
Qty: 120 ML | Refills: 0 | Status: ON HOLD | OUTPATIENT
Start: 2023-10-02 | End: 2023-10-12

## 2023-10-02 RX ORDER — GUAIFENESIN 600 MG/1
600 TABLET, EXTENDED RELEASE ORAL 2 TIMES DAILY
Qty: 10 TABLET | Refills: 0 | Status: ON HOLD | OUTPATIENT
Start: 2023-10-02 | End: 2023-10-07

## 2023-10-02 RX ADMIN — ARFORMOTEROL TARTRATE: 15 SOLUTION RESPIRATORY (INHALATION) at 23:26

## 2023-10-02 RX ADMIN — INSULIN LISPRO 2 UNITS: 100 INJECTION, SOLUTION INTRAVENOUS; SUBCUTANEOUS at 11:21

## 2023-10-02 RX ADMIN — ANASTROZOLE 1 MG: 1 TABLET, COATED ORAL at 09:05

## 2023-10-02 RX ADMIN — GUAIFENESIN 600 MG: 600 TABLET, EXTENDED RELEASE ORAL at 22:09

## 2023-10-02 RX ADMIN — HYDROMORPHONE HYDROCHLORIDE 2 MG: 2 TABLET ORAL at 03:53

## 2023-10-02 RX ADMIN — Medication: at 22:13

## 2023-10-02 RX ADMIN — MECLIZINE 12.5 MG: 12.5 TABLET ORAL at 09:05

## 2023-10-02 RX ADMIN — MECLIZINE 12.5 MG: 12.5 TABLET ORAL at 22:09

## 2023-10-02 RX ADMIN — SODIUM CHLORIDE, PRESERVATIVE FREE 10 ML: 5 INJECTION INTRAVENOUS at 09:06

## 2023-10-02 RX ADMIN — SENNOSIDES 17.2 MG: 8.6 TABLET, FILM COATED ORAL at 22:14

## 2023-10-02 RX ADMIN — SODIUM CHLORIDE, PRESERVATIVE FREE 5 ML: 5 INJECTION INTRAVENOUS at 21:00

## 2023-10-02 RX ADMIN — ACETAMINOPHEN 650 MG: 325 TABLET ORAL at 14:27

## 2023-10-02 RX ADMIN — HYDROMORPHONE HYDROCHLORIDE 2 MG: 2 TABLET ORAL at 09:17

## 2023-10-02 RX ADMIN — METOPROLOL TARTRATE 25 MG: 25 TABLET, FILM COATED ORAL at 22:08

## 2023-10-02 RX ADMIN — HYDROMORPHONE HYDROCHLORIDE 2 MG: 2 TABLET ORAL at 22:07

## 2023-10-02 RX ADMIN — MICONAZOLE NITRATE: 2 POWDER TOPICAL at 09:13

## 2023-10-02 RX ADMIN — APIXABAN 10 MG: 5 TABLET, FILM COATED ORAL at 22:08

## 2023-10-02 RX ADMIN — ATORVASTATIN CALCIUM 20 MG: 20 TABLET, FILM COATED ORAL at 09:05

## 2023-10-02 RX ADMIN — APIXABAN 10 MG: 5 TABLET, FILM COATED ORAL at 09:05

## 2023-10-02 RX ADMIN — MICONAZOLE NITRATE: 2 POWDER TOPICAL at 22:16

## 2023-10-02 RX ADMIN — ARFORMOTEROL TARTRATE: 15 SOLUTION RESPIRATORY (INHALATION) at 08:55

## 2023-10-02 RX ADMIN — ASPIRIN 81 MG: 81 TABLET, CHEWABLE ORAL at 09:04

## 2023-10-02 RX ADMIN — METHOCARBAMOL TABLETS 750 MG: 500 TABLET, COATED ORAL at 22:09

## 2023-10-02 RX ADMIN — METOPROLOL TARTRATE 25 MG: 25 TABLET, FILM COATED ORAL at 09:05

## 2023-10-02 RX ADMIN — FLUTICASONE PROPIONATE 2 SPRAY: 50 SPRAY, METERED NASAL at 09:12

## 2023-10-02 RX ADMIN — ACETAMINOPHEN 1000 MG: 500 TABLET ORAL at 22:12

## 2023-10-02 RX ADMIN — IPRATROPIUM BROMIDE 0.5 MG: 0.5 SOLUTION RESPIRATORY (INHALATION) at 23:26

## 2023-10-02 RX ADMIN — CYCLOBENZAPRINE 5 MG: 10 TABLET, FILM COATED ORAL at 22:15

## 2023-10-02 RX ADMIN — INSULIN GLARGINE 20 UNITS: 100 INJECTION, SOLUTION SUBCUTANEOUS at 22:11

## 2023-10-02 RX ADMIN — METHOCARBAMOL TABLETS 750 MG: 500 TABLET, COATED ORAL at 09:05

## 2023-10-02 RX ADMIN — ACETAMINOPHEN 650 MG: 325 TABLET ORAL at 00:46

## 2023-10-02 RX ADMIN — Medication: at 09:12

## 2023-10-02 RX ADMIN — INSULIN LISPRO 2 UNITS: 100 INJECTION, SOLUTION INTRAVENOUS; SUBCUTANEOUS at 09:06

## 2023-10-02 RX ADMIN — IPRATROPIUM BROMIDE 0.5 MG: 0.5 SOLUTION RESPIRATORY (INHALATION) at 08:50

## 2023-10-02 RX ADMIN — CYCLOBENZAPRINE 5 MG: 10 TABLET, FILM COATED ORAL at 09:05

## 2023-10-02 RX ADMIN — GUAIFENESIN 600 MG: 600 TABLET, EXTENDED RELEASE ORAL at 09:06

## 2023-10-02 RX ADMIN — HYDROMORPHONE HYDROCHLORIDE 2 MG: 2 TABLET ORAL at 18:36

## 2023-10-02 ASSESSMENT — PAIN DESCRIPTION - LOCATION
LOCATION: BACK
LOCATION: BACK
LOCATION: BACK;LEG
LOCATION: BACK

## 2023-10-02 ASSESSMENT — PAIN SCALES - GENERAL
PAINLEVEL_OUTOF10: 7
PAINLEVEL_OUTOF10: 3
PAINLEVEL_OUTOF10: 3
PAINLEVEL_OUTOF10: 9
PAINLEVEL_OUTOF10: 0
PAINLEVEL_OUTOF10: 10
PAINLEVEL_OUTOF10: 2
PAINLEVEL_OUTOF10: 7
PAINLEVEL_OUTOF10: 2

## 2023-10-02 ASSESSMENT — PAIN DESCRIPTION - ORIENTATION
ORIENTATION: RIGHT
ORIENTATION: MID
ORIENTATION: MID

## 2023-10-02 ASSESSMENT — PAIN DESCRIPTION - DESCRIPTORS
DESCRIPTORS: ACHING
DESCRIPTORS: ACHING;THROBBING;SORE;TENDER
DESCRIPTORS: ACHING

## 2023-10-02 ASSESSMENT — PAIN - FUNCTIONAL ASSESSMENT: PAIN_FUNCTIONAL_ASSESSMENT: PREVENTS OR INTERFERES WITH ALL ACTIVE AND SOME PASSIVE ACTIVITIES

## 2023-10-02 NOTE — DISCHARGE SUMMARY
tablet  Commonly known as: ARIMIDEX  Take 1 tablet by mouth daily     aspirin 81 MG chewable tablet     * cyclobenzaprine 5 MG tablet  Commonly known as: FLEXERIL     * cyclobenzaprine 10 MG tablet  Commonly known as: FLEXERIL     fluticasone 50 MCG/ACT nasal spray  Commonly known as: FLONASE     furosemide 40 MG tablet  Commonly known as: LASIX     glyBURIDE 5 MG tablet  Commonly known as: DIABETA     HYDROmorphone 2 MG tablet  Commonly known as: DILAUDID     insulin glargine 100 UNIT/ML injection vial  Commonly known as: LANTUS     ipratropium 0.5 mg-albuterol 2.5 mg 0.5-2.5 (3) MG/3ML Soln nebulizer solution  Commonly known as: DUONEB     lidocaine 4 % external patch     loratadine 10 MG tablet  Commonly known as: CLARITIN     lovastatin 40 MG tablet  Commonly known as: MEVACOR     methocarbamol 750 MG tablet  Commonly known as: ROBAXIN     metoprolol tartrate 25 MG tablet  Commonly known as: LOPRESSOR     nitroGLYCERIN 0.4 MG SL tablet  Commonly known as: NITROSTAT     NovoLOG FlexPen 100 UNIT/ML injection pen  Generic drug: insulin aspart     omeprazole 40 MG delayed release capsule  Commonly known as: PRILOSEC     predniSONE 20 MG tablet  Commonly known as: DELTASONE     spironolactone 25 MG tablet  Commonly known as: ALDACTONE     Trelegy Ellipta 100-62.5-25 MCG/ACT Aepb inhaler  Generic drug: fluticasone-umeclidin-vilant           * This list has 4 medication(s) that are the same as other medications prescribed for you. Read the directions carefully, and ask your doctor or other care provider to review them with you.                 STOP taking these medications      ammonium lactate 12 % lotion  Commonly known as: LAC-HYDRIN     lidocaine viscous hcl 2 % Soln solution  Commonly known as: XYLOCAINE     nystatin [id number] UNIT/GM cream  Commonly known as: MYCOSTATIN               Where to Get Your Medications        These medications were sent to [facility]/pharmacy #[id number]- [place], [address]

## 2023-10-02 NOTE — DISCHARGE INSTRUCTIONS
HOSPITALIST DISCHARGE INSTRUCTIONS    NAME: Josh Castro   :  1970   MRN:  [id number]     Date/Time:  10/2/2023 12:25 PM    ADMIT DATE: 2023     DISCHARGE DATE: 10/2/2023     DISCHARGE DIAGNOSIS:  Acute Pulmonary embolism POA- stable on Eliquis  Mild underlying COPD exacerbation  Chronic Resp failure with Hypoxia POA- on 5L/m NC oxygen at baseline  Metastatic breast cancer POA-> Bones ad Lungs   Chronic Pain syndrome due to Bony mets POA- pt requires positioning of the body in ways not feasible with an ordinary bed in order to alleviate the pain- needs Hospital bed (DME), Cont outpatient plans of Radiation therapy at [address] [place] [address] as planned before  Morbid Obesity POA- needs Yves lift (DME) for transfers from bed to chair  CAD  CHF  Diabetes  HTN  FULL CODE- seen by palliative medicine this admission- pt not ready for Hospice yet    MEDICATIONS:  As per medication reconciliation  list  It is important that you take the medication exactly as they are prescribed. Keep your medication in the bottles provided by the pharmacist and keep a list of the medication names, dosages, and times to be taken in your wallet. Do not take other medications without consulting your doctor. Pain Management: per above medications    What to do at Home    Recommended diet:  cardiac diet, diabetic diet, and low fat, low cholesterol diet    Recommended activity: activity as tolerated    If you have questions regarding the hospital related prescriptions or hospital related issues please call at [phone]. If you experience any of the following symptoms then please call your primary care physician or return to the emergency room if you cannot get hold of your doctor:  Fever, chills, nausea, vomiting, diarrhea, change in mentation, falling, bleeding, shortness of breath,     Follow Up:  PCP  you are to call and set up an appointment to see them in 7-10 days.   [facility] Oncology team for further

## 2023-10-03 LAB
GLUCOSE BLD STRIP.AUTO-MCNC: 197 MG/DL (ref 65–117)
GLUCOSE BLD STRIP.AUTO-MCNC: 202 MG/DL (ref 65–117)
GLUCOSE BLD STRIP.AUTO-MCNC: 223 MG/DL (ref 65–117)
GLUCOSE BLD STRIP.AUTO-MCNC: 232 MG/DL (ref 65–117)
GLUCOSE BLD STRIP.AUTO-MCNC: 259 MG/DL (ref 65–117)
SERVICE CMNT-IMP: ABNORMAL

## 2023-10-03 PROCEDURE — 82962 GLUCOSE BLOOD TEST: CPT

## 2023-10-03 PROCEDURE — 99231 SBSQ HOSP IP/OBS SF/LOW 25: CPT

## 2023-10-03 PROCEDURE — 6360000002 HC RX W HCPCS: Performed by: INTERNAL MEDICINE

## 2023-10-03 PROCEDURE — 6370000000 HC RX 637 (ALT 250 FOR IP): Performed by: INTERNAL MEDICINE

## 2023-10-03 PROCEDURE — 6370000000 HC RX 637 (ALT 250 FOR IP): Performed by: FAMILY MEDICINE

## 2023-10-03 PROCEDURE — 2700000000 HC OXYGEN THERAPY PER DAY

## 2023-10-03 PROCEDURE — 6370000000 HC RX 637 (ALT 250 FOR IP)

## 2023-10-03 PROCEDURE — 2580000003 HC RX 258: Performed by: STUDENT IN AN ORGANIZED HEALTH CARE EDUCATION/TRAINING PROGRAM

## 2023-10-03 PROCEDURE — 94640 AIRWAY INHALATION TREATMENT: CPT

## 2023-10-03 PROCEDURE — 6370000000 HC RX 637 (ALT 250 FOR IP): Performed by: STUDENT IN AN ORGANIZED HEALTH CARE EDUCATION/TRAINING PROGRAM

## 2023-10-03 PROCEDURE — 2060000000 HC ICU INTERMEDIATE R&B

## 2023-10-03 RX ORDER — CYCLOBENZAPRINE HCL 5 MG
5 TABLET ORAL 3 TIMES DAILY
Qty: 30 TABLET | Refills: 0 | Status: ON HOLD | OUTPATIENT
Start: 2023-10-03 | End: 2023-10-13

## 2023-10-03 RX ORDER — INSULIN GLARGINE 100 [IU]/ML
24 INJECTION, SOLUTION SUBCUTANEOUS NIGHTLY
Status: DISCONTINUED | OUTPATIENT
Start: 2023-10-03 | End: 2023-10-04 | Stop reason: HOSPADM

## 2023-10-03 RX ORDER — HYDROMORPHONE HYDROCHLORIDE 2 MG/1
2 TABLET ORAL EVERY 4 HOURS PRN
Qty: 30 TABLET | Refills: 0 | Status: ON HOLD | OUTPATIENT
Start: 2023-10-03 | End: 2023-10-10

## 2023-10-03 RX ADMIN — METHOCARBAMOL TABLETS 750 MG: 500 TABLET, COATED ORAL at 21:25

## 2023-10-03 RX ADMIN — METOPROLOL TARTRATE 25 MG: 25 TABLET, FILM COATED ORAL at 21:25

## 2023-10-03 RX ADMIN — METHOCARBAMOL TABLETS 750 MG: 500 TABLET, COATED ORAL at 10:03

## 2023-10-03 RX ADMIN — ASPIRIN 81 MG: 81 TABLET, CHEWABLE ORAL at 10:02

## 2023-10-03 RX ADMIN — MICONAZOLE NITRATE: 2 POWDER TOPICAL at 10:12

## 2023-10-03 RX ADMIN — ACETAMINOPHEN 1000 MG: 500 TABLET ORAL at 21:25

## 2023-10-03 RX ADMIN — MICONAZOLE NITRATE: 2 POWDER TOPICAL at 21:34

## 2023-10-03 RX ADMIN — INSULIN LISPRO 2 UNITS: 100 INJECTION, SOLUTION INTRAVENOUS; SUBCUTANEOUS at 17:07

## 2023-10-03 RX ADMIN — METOPROLOL TARTRATE 25 MG: 25 TABLET, FILM COATED ORAL at 10:03

## 2023-10-03 RX ADMIN — GUAIFENESIN 600 MG: 600 TABLET, EXTENDED RELEASE ORAL at 10:03

## 2023-10-03 RX ADMIN — ARFORMOTEROL TARTRATE: 15 SOLUTION RESPIRATORY (INHALATION) at 07:34

## 2023-10-03 RX ADMIN — HYDROMORPHONE HYDROCHLORIDE 2 MG: 2 TABLET ORAL at 05:35

## 2023-10-03 RX ADMIN — CYCLOBENZAPRINE 5 MG: 10 TABLET, FILM COATED ORAL at 10:03

## 2023-10-03 RX ADMIN — CYCLOBENZAPRINE 5 MG: 10 TABLET, FILM COATED ORAL at 14:39

## 2023-10-03 RX ADMIN — SODIUM CHLORIDE, PRESERVATIVE FREE 10 ML: 5 INJECTION INTRAVENOUS at 10:08

## 2023-10-03 RX ADMIN — ANASTROZOLE 1 MG: 1 TABLET, COATED ORAL at 10:05

## 2023-10-03 RX ADMIN — HYDROMORPHONE HYDROCHLORIDE 2 MG: 2 TABLET ORAL at 14:39

## 2023-10-03 RX ADMIN — SENNOSIDES 17.2 MG: 8.6 TABLET, FILM COATED ORAL at 21:25

## 2023-10-03 RX ADMIN — ARFORMOTEROL TARTRATE: 15 SOLUTION RESPIRATORY (INHALATION) at 19:45

## 2023-10-03 RX ADMIN — CYCLOBENZAPRINE 5 MG: 10 TABLET, FILM COATED ORAL at 21:24

## 2023-10-03 RX ADMIN — MECLIZINE 12.5 MG: 12.5 TABLET ORAL at 14:39

## 2023-10-03 RX ADMIN — ATORVASTATIN CALCIUM 20 MG: 20 TABLET, FILM COATED ORAL at 10:03

## 2023-10-03 RX ADMIN — Medication: at 10:13

## 2023-10-03 RX ADMIN — APIXABAN 10 MG: 5 TABLET, FILM COATED ORAL at 21:25

## 2023-10-03 RX ADMIN — ACETAMINOPHEN 650 MG: 325 TABLET ORAL at 14:46

## 2023-10-03 RX ADMIN — METHOCARBAMOL TABLETS 750 MG: 500 TABLET, COATED ORAL at 14:38

## 2023-10-03 RX ADMIN — HYDROMORPHONE HYDROCHLORIDE 2 MG: 2 TABLET ORAL at 10:03

## 2023-10-03 RX ADMIN — INSULIN GLARGINE 24 UNITS: 100 INJECTION, SOLUTION SUBCUTANEOUS at 21:25

## 2023-10-03 RX ADMIN — APIXABAN 10 MG: 5 TABLET, FILM COATED ORAL at 10:02

## 2023-10-03 RX ADMIN — FLUTICASONE PROPIONATE 2 SPRAY: 50 SPRAY, METERED NASAL at 10:10

## 2023-10-03 RX ADMIN — Medication: at 21:35

## 2023-10-03 RX ADMIN — INSULIN LISPRO 2 UNITS: 100 INJECTION, SOLUTION INTRAVENOUS; SUBCUTANEOUS at 10:09

## 2023-10-03 RX ADMIN — GUAIFENESIN 600 MG: 600 TABLET, EXTENDED RELEASE ORAL at 21:25

## 2023-10-03 RX ADMIN — IPRATROPIUM BROMIDE 0.5 MG: 0.5 SOLUTION RESPIRATORY (INHALATION) at 19:40

## 2023-10-03 RX ADMIN — IPRATROPIUM BROMIDE 0.5 MG: 0.5 SOLUTION RESPIRATORY (INHALATION) at 07:29

## 2023-10-03 RX ADMIN — SODIUM CHLORIDE, PRESERVATIVE FREE 10 ML: 5 INJECTION INTRAVENOUS at 21:26

## 2023-10-03 RX ADMIN — INSULIN LISPRO 2 UNITS: 100 INJECTION, SOLUTION INTRAVENOUS; SUBCUTANEOUS at 12:59

## 2023-10-03 RX ADMIN — MECLIZINE 12.5 MG: 12.5 TABLET ORAL at 21:25

## 2023-10-03 RX ADMIN — MECLIZINE 12.5 MG: 12.5 TABLET ORAL at 10:05

## 2023-10-03 RX ADMIN — HYDROMORPHONE HYDROCHLORIDE 2 MG: 2 TABLET ORAL at 18:47

## 2023-10-03 ASSESSMENT — PAIN SCALES - GENERAL
PAINLEVEL_OUTOF10: 7
PAINLEVEL_OUTOF10: 8
PAINLEVEL_OUTOF10: 7
PAINLEVEL_OUTOF10: 5
PAINLEVEL_OUTOF10: 0
PAINLEVEL_OUTOF10: 6
PAINLEVEL_OUTOF10: 5
PAINLEVEL_OUTOF10: 7
PAINLEVEL_OUTOF10: 0

## 2023-10-03 ASSESSMENT — PAIN DESCRIPTION - FREQUENCY
FREQUENCY: CONTINUOUS

## 2023-10-03 ASSESSMENT — PAIN DESCRIPTION - DESCRIPTORS
DESCRIPTORS: ACHING

## 2023-10-03 ASSESSMENT — PAIN DESCRIPTION - ORIENTATION
ORIENTATION: MID

## 2023-10-03 ASSESSMENT — PAIN DESCRIPTION - ONSET
ONSET: ON-GOING

## 2023-10-03 ASSESSMENT — PAIN DESCRIPTION - PAIN TYPE
TYPE: CHRONIC PAIN

## 2023-10-03 ASSESSMENT — PAIN DESCRIPTION - LOCATION
LOCATION: BACK

## 2023-10-03 NOTE — PLAN OF CARE
ADULT PROTOCOL: JET AEROSOL ASSESSMENT    Patient  Carmen Jerry     48 y.o.   female     10/3/2023  10:38 AM    Breath Sounds Pre Procedure: Breath Sounds Pre-Tx NITO: Diminished                                  Breath Sounds Pre-Tx LLL: Diminished        Breath Sounds Pre-Tx RUL: Diminished        Breath Sounds Pre-Tx RML: Diminished        Breath Sounds Pre-Tx RLL: Diminished  Breath Sounds Post Procedure: Breath Sounds Post-Tx NITO: Diminished          Breath Sounds Post-Tx LLL: Diminished          Breath Sounds Post-Tx RUL: Diminished          Breath Sounds Post-Tx RML: Diminished          Breath Sounds Post-Tx RLL: Diminished                                   Heart Rate: Pre procedure Pre-Tx Pulse: 88           Post procedure Post-Tx Pulse: 89    Resp Rate: Pre procedure Pre-Tx Resps: 16           Post procedure Post-Tx Resps: 17    SpO2:  SpO2: 93 %   with Oxygen                Nebulizer Therapy: Current medications Medications: Albuterol/Ipratropium, Ipratropium Bromide      Changed: No    Problem List:   Patient Active Problem List   Diagnosis    Multifocal pneumonia    Counseling regarding goals of care    COPD exacerbation (HCC)    Type II diabetes mellitus, uncontrolled    Acute on chronic respiratory failure with hypoxia and hypercapnia (HCC)    Gangrenous toe (HCC)    Maggot infestation    Cellulitis, leg    Sepsis associated hypotension (HCC)    Tobacco abuse    Hypercapnic respiratory failure (HCC)    Productive cough    S/P PTCA (percutaneous transluminal coronary angioplasty)    Asthma    Acute respiratory failure (HCC)    Noncompliance with therapeutic plan    Respiratory failure (HCC)    Acute on chronic diastolic heart failure (HCC)    Hypoxia    Metastatic malignant neoplasm (HCC)    Constipation    Back pain    Spinal stenosis    Palliative care by specialist    Carcinoma of right breast metastatic to bone ([address])    Cancer associated pain    Failure to thrive in adult    Need for emotional
ADULT PROTOCOL: JET AEROSOL ASSESSMENT    Patient  Efrain Tellez     48 y.o.   female     10/2/2023  9:17 AM    Breath Sounds Pre Procedure: Breath Sounds Pre-Tx NITO: Diminished                                  Breath Sounds Pre-Tx LLL: Diminished        Breath Sounds Pre-Tx RUL: Diminished        Breath Sounds Pre-Tx RML: Diminished        Breath Sounds Pre-Tx RLL: Diminished  Breath Sounds Post Procedure: Breath Sounds Post-Tx NITO: Diminished          Breath Sounds Post-Tx LLL: Diminished          Breath Sounds Post-Tx RUL: Diminished          Breath Sounds Post-Tx RML: Diminished          Breath Sounds Post-Tx RLL: Diminished                                   Heart Rate: Pre procedure Pre-Tx Pulse: 101           Post procedure Post-Tx Pulse: 105    Resp Rate: Pre procedure Pre-Tx Resps: 18           Post procedure Post-Tx Resps: 19    SpO2:  SpO2: 90 %   with Oxygen                Nebulizer Therapy: Current medications Medications: Arformoterol, Budesonide      Changed: No    Problem List:   Patient Active Problem List   Diagnosis    Multifocal pneumonia    Counseling regarding goals of care    COPD exacerbation (HCC)    Type II diabetes mellitus, uncontrolled    Acute on chronic respiratory failure with hypoxia and hypercapnia (HCC)    Gangrenous toe (HCC)    Maggot infestation    Cellulitis, leg    Sepsis associated hypotension (HCC)    Tobacco abuse    Hypercapnic respiratory failure (HCC)    Productive cough    S/P PTCA (percutaneous transluminal coronary angioplasty)    Asthma    Acute respiratory failure (HCC)    Noncompliance with therapeutic plan    Respiratory failure (HCC)    Acute on chronic diastolic heart failure (HCC)    Hypoxia    Metastatic malignant neoplasm (HCC)    Constipation    Back pain    Spinal stenosis    Palliative care by specialist    Carcinoma of right breast metastatic to bone ([address])    Cancer associated pain    Failure to thrive in adult    Need for emotional support    Morbid
Problem: Occupational Therapy - Adult  Goal: By Discharge: Performs self-care activities at highest level of function for planned discharge setting. See evaluation for individualized goals. Description: FUNCTIONAL STATUS PRIOR TO ADMISSION:  Patient primarily bed bound, has not ambulated in over 1 month. Only stood a few times while in rehab. Reports she transferred bed[place] earlier this week with assist x2, unable to transfer back to bed and had to call EMS for assist back to bed. Total A for toileting and LB ADLs, mod A for UB ADLs and independent for feeding and grooming. All ADLs performed at bed level. HOME SUPPORT PRIOR TO ADMISSION: The patient lived with son & nephew and required total assistance for bed mobility and     Occupational Therapy Goals:  Initiated 9/29/2023  1. Patient will perform grooming seated EOB with Set-up and Supervision within 7 day(s). 2.  Patient will perform upper body dressing seated EOB with Set-up and Supervision within 7 day(s). 3.  Patient will perform upper body bathing seated EOB with Set-up and Supervision within 7 day(s). 4.  Patient will perform bed mobility in preparation for seated ADLs with Modified [place]  within 7 day(s). 5.  Patient will perform all aspects of toileting with Maximal Assist within 7 day(s). 6.  Patient will transfer to [facility] with Moderate Assist and Assist x2 within 7 day(s). 7.  Patient will perform Lower body bathing with maximum assistance seated EOB within 7 days.   Outcome: Progressing     OCCUPATIONAL THERAPY EVALUATION    Patient: Efrain Tellez (74 y.o. female)  Date: 9/29/2023  Primary Diagnosis: Pulmonary embolus, right (HCC) [I26.99]  Acute pulmonary embolism without acute cor pulmonale, unspecified pulmonary embolism type ([address]) [I26.99]         Precautions: Fall Risk, RLE was TTWB in August, but patient reports a [facility] Orthopedist has cleared her for WBAT    ASSESSMENT :  The patient is limited by GW, baseline obesity,
Problem: Pain  Goal: Verbalizes/displays adequate comfort level or baseline comfort level  Outcome: Progressing     Problem: Safety - Adult  Goal: Free from fall injury  Outcome: Progressing
Problem: Physical Therapy - Adult  Goal: By Discharge: Performs mobility at highest level of function for planned discharge setting. See evaluation for individualized goals. Description: FUNCTIONAL STATUS PRIOR TO ADMISSION: Patient primarily bed bound, has not ambulated in over 1 month. Only stood a few times while in rehab. Reports she transferred bed>[facility] earlier this week with assist x2, unable to transfer back to bed and had to call EMS for assist back to bed. Total A for pericare. HOME SUPPORT PRIOR TO ADMISSION: The patient lived with son & nephew and required total assistance for bed mobility and basic ADLs. Physical Therapy Goals  Initiated 9/29/2023  1. Patient will move from supine to sit and sit to supine, scoot up and down, and roll side to side in bed with supervision/set-up within 7 day(s). 2.  Patient will perform sit to stand with moderate assistance within 7 day(s). 3.  Patient will transfer from bed to chair and chair to bed with moderate assistance x2 using the least restrictive device within 7 day(s). 4.  Patient will tolerate sitting EOB x10 min while performing exercises within 7 days. Outcome: Progressing   PHYSICAL THERAPY EVALUATION    Patient: Lauri Hickman (42 y.o. female)  Date: 9/29/2023  Primary Diagnosis: Pulmonary embolus, right (HCC) [I26.99]  Acute pulmonary embolism without acute cor pulmonale, unspecified pulmonary embolism type ([address]) [I26.99]       Precautions: Fall Risk                  ASSESSMENT :   DEFICITS/IMPAIRMENTS:   The patient is limited by decreased functional mobility, independence in ADLs, sensation, activity tolerance, balance, increased pain levels, decreased insight into condition, and decreased overall independence following admission for PE. PmHx significant for metastatic breast cancer with widespread mets to spine and R femur. Patient reports she was cleared by orthopedist to be WBAT on RLE.       Based on the impairments listed above patient
Problem: Respiratory - Adult  Goal: Achieves optimal ventilation and oxygenation  10/1/2023 2251 by Claudell Dole, RN  Outcome: Progressing  10/1/2023 1019 by Dixon Castro RN  Outcome: Progressing     Problem: Cardiovascular - Adult  Goal: Maintains optimal cardiac output and hemodynamic stability  10/1/2023 2251 by Claudell Dole, RN  Outcome: Progressing  10/1/2023 1019 by Dixon Castro RN  Outcome: Progressing     Problem: Genitourinary - Adult  Goal: Absence of urinary retention  10/1/2023 2251 by Claudell Dole, RN  Outcome: Progressing  10/1/2023 1019 by Dixon Castro RN  Outcome: Progressing
Problem: Skin/Tissue Integrity  Goal: Absence of new skin breakdown  Description: 1. Monitor for areas of redness and/or skin breakdown  2. Assess vascular access sites hourly  3. Every 4-6 hours minimum:  Change oxygen saturation probe site  4. Every 4-6 hours:  If on nasal continuous positive airway pressure, respiratory therapy assess nares and determine need for appliance change or resting period.   Outcome: Progressing     Problem: Respiratory - Adult  Goal: Achieves optimal ventilation and oxygenation  10/1/2023 1019 by Jose D Pruitt RN  Outcome: Progressing  10/1/2023 0825 by RT Anum  Outcome: Progressing     Problem: Discharge Planning  Goal: Discharge to home or other facility with appropriate resources  Outcome: Progressing     Problem: Chronic Conditions and Co-morbidities  Goal: Patient's chronic conditions and co-morbidity symptoms are monitored and maintained or improved  Outcome: Progressing     Problem: Pain  Goal: Verbalizes/displays adequate comfort level or baseline comfort level  Outcome: Progressing     Problem: Safety - Adult  Goal: Free from fall injury  Outcome: Progressing     Problem: Neurosensory - Adult  Goal: Achieves stable or improved neurological status  Outcome: Progressing     Problem: Cardiovascular - Adult  Goal: Maintains optimal cardiac output and hemodynamic stability  Outcome: Progressing  Goal: Absence of cardiac dysrhythmias or at baseline  Outcome: Progressing     Problem: Skin/Tissue Integrity - Adult  Goal: Skin integrity remains intact  Outcome: Progressing     Problem: Musculoskeletal - Adult  Goal: Return mobility to safest level of function  Outcome: Progressing     Problem: Gastrointestinal - Adult  Goal: Minimal or absence of nausea and vomiting  Outcome: Progressing  Goal: Maintains or returns to baseline bowel function  Outcome: Progressing  Goal: Maintains adequate nutritional intake  Outcome: Progressing     Problem: Genitourinary - Adult  Goal:
Problem: Skin/Tissue Integrity  Goal: Absence of new skin breakdown  Description: 1. Monitor for areas of redness and/or skin breakdown  2. Assess vascular access sites hourly  3. Every 4-6 hours minimum:  Change oxygen saturation probe site  4. Every 4-6 hours:  If on nasal continuous positive airway pressure, respiratory therapy assess nares and determine need for appliance change or resting period.   Outcome: Progressing     Problem: Respiratory - Adult  Goal: Achieves optimal ventilation and oxygenation  10/3/2023 1147 by Tiera Ibarra RN  Outcome: Progressing  10/3/2023 1038 by Ishaan Mast RCP  Outcome: Progressing  10/2/2023 2330 by Leilani Espinosa RCP  Outcome: Progressing
Problem: Skin/Tissue Integrity  Goal: Absence of new skin breakdown  Description: 1. Monitor for areas of redness and/or skin breakdown  2. Assess vascular access sites hourly  3. Every 4-6 hours minimum:  Change oxygen saturation probe site  4. Every 4-6 hours:  If on nasal continuous positive airway pressure, respiratory therapy assess nares and determine need for appliance change or resting period.   Outcome: Progressing     Problem: Respiratory - Adult  Goal: Achieves optimal ventilation and oxygenation  Outcome: Progressing
Problem: Skin/Tissue Integrity  Goal: Absence of new skin breakdown  Description: 1. Monitor for areas of redness and/or skin breakdown  2. Assess vascular access sites hourly  3. Every 4-6 hours minimum:  Change oxygen saturation probe site  4. Every 4-6 hours:  If on nasal continuous positive airway pressure, respiratory therapy assess nares and determine need for appliance change or resting period.   Outcome: Progressing     Problem: Respiratory - Adult  Goal: Achieves optimal ventilation and oxygenation  Outcome: Progressing     Problem: Discharge Planning  Goal: Discharge to home or other facility with appropriate resources  Outcome: Progressing     Problem: Chronic Conditions and Co-morbidities  Goal: Patient's chronic conditions and co-morbidity symptoms are monitored and maintained or improved  Outcome: Progressing     Problem: Pain  Goal: Verbalizes/displays adequate comfort level or baseline comfort level  9/29/2023 0803 by Nicola Hampton RN  Outcome: Progressing  9/29/2023 0012 by Ra Valentino RN  Outcome: Progressing     Problem: Safety - Adult  Goal: Free from fall injury  9/29/2023 0803 by Nicola Hampton RN  Outcome: Progressing  9/29/2023 0012 by Ra Valentino RN  Outcome: Progressing
Progressing  Goal: Hemodynamic stability and optimal renal function maintained  Outcome: Progressing

## 2023-10-03 NOTE — DIABETES MGMT
[facility]  DIABETES MANAGEMENT CONSULT    Consulted by Provider for advanced nursing evaluation and care for inpatient blood glucose management. Evaluation and Action Plan   This 48year old female with Type 2 diabetes had a recent admission 8/20/23 in which BG control was achieved. The patient was discharged to [facility] and the patient reports she was receiving 20 units Lantus daily. The patient was readmitted on yesterday after returning home from [facility]. She was started on 15 units Lantus. BG's have ranged from 148-208 this admission. I will slightly increase basal insulin dose for tonight. Management Rationale Action Plan   Medication   Basal needs Using 0.1 units/kg/D based on weight  Use 18 units Lantus nightly   Corrective insulin Using medium dose sensitivity based on Bg trends        Diabetes Discharge Plan   Medication  TBD   Additional orders            Initial Presentation   Peggy Butler is a 48 y.o. female admitted from home after experiencing SOB. The patient was recently discharged from [facility] after a hospital stay. Once home , the patient was having difficulty providing care for herself. The patient has been unable to walk for several months. LAB: Glucose 170. Creatine 0.74 GFR>60      CT:EXAM:  CTA CHEST W WO CONTRAST     INDICATION: ?PE      COMPARISON: CT chest 8/28/2023, chest radiograph 8/26/2023     TECHNIQUE: Helical thin section chest CT following uneventful intravenous  administration of nonionic contrast 100 mL of isovue 370 according to  departmental PE protocol. Coronal and sagittal reformats were performed. 3D post  processing was performed. CT dose reduction was achieved through the use of a  standardized protocol tailored for this examination and automatic exposure  control for dose modulation. FINDINGS: This is a good quality study for the evaluation of pulmonary embolism  to the first subsegmental arterial level.  Right upper lobe embolus
Dose Comments   Nutrition:  Standard meals     60 grams/meal      Pain PRN acetaminophen  PRN Dilaudid    Other:   Kidney function  Liver function     Normal  Normal        Blood glucose pattern        Assessment and Nursing Intervention   Nursing Diagnosis Risk for unstable blood glucose pattern   Nursing Intervention Domain 5250 Decision-making Support   Nursing Interventions Examined current inpatient diabetes/blood glucose control   Explored factors facilitating and impeding inpatient management  Explored corrective strategies with patient and responsible inpatient provider   Informed patient of rational for insulin strategy while hospitalized     Nursing Diagnosis 29213 Ineffective Health Management   Nursing Intervention Domain 5250 Decision-making Support   Nursing Interventions Identified diabetes self-management practices impeding diabetes control  Discussed diabetes survival skills related to  Healthy Plate eating plan; given handouts  Role of physical activity in improving insulin sensitivity and action  Procedure for blood glucose monitoring & options for low-cost products  Medications plan at discharge     Billing Code(s)   [] 14709 IP subsequent hospital care - 50 minutes   [] 28365 IP subsequent hospital care - 35 minutes   [x] 22948 IP subsequent hospital care - 25 minutes   [] 00415 IP initial hospital care - 40 minutes     Before making these care recommendations, I personally reviewed the hospitalization record, including notes, laboratory & diagnostic data and current medications, and examined the patient at the bedside (circumstances permitting) before determining care. More than fifty (50) percent of the time was spent in patient counseling and/or care coordination.   Total minutes: 25 minutes    LUIS ANGEL Woo - CNS  Diabetes Clinical Nurse Specialist  [facility]  Access via [address] [place] [address]
Value Date    LABA1C 6.1 09/14/2019       Factors impacting BG management  Factor Dose Comments   Nutrition:  Standard meals     60 grams/meal      Pain PRN acetaminophen  PRN Dilaudid    Other:   Kidney function  Liver function     Normal  Normal        Blood glucose pattern        Assessment and Nursing Intervention   Nursing Diagnosis Risk for unstable blood glucose pattern   Nursing Intervention Domain 5250 Decision-making Support   Nursing Interventions Examined current inpatient diabetes/blood glucose control   Explored factors facilitating and impeding inpatient management  Explored corrective strategies with patient and responsible inpatient provider   Informed patient of rational for insulin strategy while hospitalized     Nursing Diagnosis 61485 Ineffective Health Management   Nursing Intervention Domain 5250 Decision-making Support   Nursing Interventions Identified diabetes self-management practices impeding diabetes control  Discussed diabetes survival skills related to  Healthy Plate eating plan; given handouts  Role of physical activity in improving insulin sensitivity and action  Procedure for blood glucose monitoring & options for low-cost products  Medications plan at discharge     Billing Code(s)   [] 19257 IP subsequent hospital care - 50 minutes   [] 60818 IP subsequent hospital care - 35 minutes   [x] 39259 IP subsequent hospital care - 25 minutes   [] [id number] IP initial hospital care - 40 minutes     Before making these care recommendations, I personally reviewed the hospitalization record, including notes, laboratory & diagnostic data and current medications, and examined the patient at the bedside (circumstances permitting) before determining care. More than fifty (50) percent of the time was spent in patient counseling and/or care coordination.   Total minutes: 25 minutes    LUIS ANGEL Cast - CNS  Diabetes Clinical Nurse Specialist  [facility]  Access via [address] [place] [address]

## 2023-10-04 VITALS
TEMPERATURE: 98 F | DIASTOLIC BLOOD PRESSURE: 70 MMHG | RESPIRATION RATE: 16 BRPM | BODY MASS INDEX: 47.09 KG/M2 | HEART RATE: 92 BPM | HEIGHT: 66 IN | SYSTOLIC BLOOD PRESSURE: 110 MMHG | WEIGHT: 293 LBS | OXYGEN SATURATION: 96 %

## 2023-10-04 LAB
BACTERIA SPEC CULT: NORMAL
GLUCOSE BLD STRIP.AUTO-MCNC: 184 MG/DL (ref 65–117)
GLUCOSE BLD STRIP.AUTO-MCNC: 230 MG/DL (ref 65–117)
SERVICE CMNT-IMP: ABNORMAL
SERVICE CMNT-IMP: ABNORMAL
SERVICE CMNT-IMP: NORMAL

## 2023-10-04 PROCEDURE — 6370000000 HC RX 637 (ALT 250 FOR IP): Performed by: STUDENT IN AN ORGANIZED HEALTH CARE EDUCATION/TRAINING PROGRAM

## 2023-10-04 PROCEDURE — 6360000002 HC RX W HCPCS: Performed by: INTERNAL MEDICINE

## 2023-10-04 PROCEDURE — 6370000000 HC RX 637 (ALT 250 FOR IP): Performed by: FAMILY MEDICINE

## 2023-10-04 PROCEDURE — 6370000000 HC RX 637 (ALT 250 FOR IP): Performed by: INTERNAL MEDICINE

## 2023-10-04 PROCEDURE — 2700000000 HC OXYGEN THERAPY PER DAY

## 2023-10-04 PROCEDURE — 82962 GLUCOSE BLOOD TEST: CPT

## 2023-10-04 PROCEDURE — 94640 AIRWAY INHALATION TREATMENT: CPT

## 2023-10-04 RX ORDER — FUROSEMIDE 40 MG/1
40 TABLET ORAL 2 TIMES DAILY
Status: DISCONTINUED | OUTPATIENT
Start: 2023-10-04 | End: 2023-10-04 | Stop reason: HOSPADM

## 2023-10-04 RX ORDER — SPIRONOLACTONE 25 MG/1
25 TABLET ORAL DAILY
Status: DISCONTINUED | OUTPATIENT
Start: 2023-10-04 | End: 2023-10-04 | Stop reason: HOSPADM

## 2023-10-04 RX ADMIN — HYDROMORPHONE HYDROCHLORIDE 2 MG: 2 TABLET ORAL at 14:24

## 2023-10-04 RX ADMIN — CYCLOBENZAPRINE 5 MG: 10 TABLET, FILM COATED ORAL at 08:42

## 2023-10-04 RX ADMIN — ATORVASTATIN CALCIUM 20 MG: 20 TABLET, FILM COATED ORAL at 08:41

## 2023-10-04 RX ADMIN — SPIRONOLACTONE 25 MG: 25 TABLET ORAL at 08:40

## 2023-10-04 RX ADMIN — FUROSEMIDE 40 MG: 40 TABLET ORAL at 08:40

## 2023-10-04 RX ADMIN — MICONAZOLE NITRATE: 2 POWDER TOPICAL at 08:46

## 2023-10-04 RX ADMIN — IPRATROPIUM BROMIDE AND ALBUTEROL SULFATE 1 DOSE: .5; 3 SOLUTION RESPIRATORY (INHALATION) at 01:22

## 2023-10-04 RX ADMIN — ACETAMINOPHEN 1000 MG: 500 TABLET ORAL at 08:41

## 2023-10-04 RX ADMIN — METOPROLOL TARTRATE 25 MG: 25 TABLET, FILM COATED ORAL at 08:41

## 2023-10-04 RX ADMIN — ANASTROZOLE 1 MG: 1 TABLET, COATED ORAL at 08:40

## 2023-10-04 RX ADMIN — IPRATROPIUM BROMIDE 0.5 MG: 0.5 SOLUTION RESPIRATORY (INHALATION) at 08:39

## 2023-10-04 RX ADMIN — CYCLOBENZAPRINE 5 MG: 10 TABLET, FILM COATED ORAL at 13:32

## 2023-10-04 RX ADMIN — APIXABAN 10 MG: 5 TABLET, FILM COATED ORAL at 08:40

## 2023-10-04 RX ADMIN — GUAIFENESIN 600 MG: 600 TABLET, EXTENDED RELEASE ORAL at 08:41

## 2023-10-04 RX ADMIN — METHOCARBAMOL TABLETS 750 MG: 500 TABLET, COATED ORAL at 08:40

## 2023-10-04 RX ADMIN — MECLIZINE 12.5 MG: 12.5 TABLET ORAL at 08:40

## 2023-10-04 RX ADMIN — ARFORMOTEROL TARTRATE: 15 SOLUTION RESPIRATORY (INHALATION) at 08:38

## 2023-10-04 RX ADMIN — ASPIRIN 81 MG: 81 TABLET, CHEWABLE ORAL at 08:40

## 2023-10-04 RX ADMIN — METHOCARBAMOL TABLETS 750 MG: 500 TABLET, COATED ORAL at 13:33

## 2023-10-04 RX ADMIN — MECLIZINE 12.5 MG: 12.5 TABLET ORAL at 13:32

## 2023-10-04 RX ADMIN — INSULIN LISPRO 2 UNITS: 100 INJECTION, SOLUTION INTRAVENOUS; SUBCUTANEOUS at 08:44

## 2023-10-04 ASSESSMENT — PAIN SCALES - GENERAL
PAINLEVEL_OUTOF10: 6
PAINLEVEL_OUTOF10: 0

## 2023-10-04 ASSESSMENT — PAIN DESCRIPTION - LOCATION: LOCATION: HIP;LEG

## 2023-10-04 ASSESSMENT — PAIN DESCRIPTION - ORIENTATION: ORIENTATION: RIGHT;LEFT;POSTERIOR

## 2023-10-04 ASSESSMENT — PAIN DESCRIPTION - DESCRIPTORS: DESCRIPTORS: ACHING

## 2023-10-04 NOTE — CARE COORDINATION
:    OMAR completed a UAI for [facility] from previous hospitalization - copy sent to Tejinder and Savanah at facility.  CJ Corbett
[facility] at 10:00 am on 10/4/2023 to home address. 3204 - Update DME orders sent to [facility] and multiple other agencies via [address], pending review and approval.  Discharge appeal is still pending. 3892 - Updated discharge summary sent to [facility]. [facility] does not provide hospital beds. Pattie Smith is reviewing.    1201 to 200 - Appeal is still pending. [facility] requested for 3pm pending patient's appeal decision. CM reached out to [facility] to determine if equipment has been approved. 1244 - DME (karmen lift and hospital bed) appear to have been approved by [facility]. CM provided [facility] with the patient's phone number to contact and schedule delivery. [facility] confirmed for 3pm.    1344 - Appeal was denied and patient needs to leave by 12pm tomorrow or she may be financially responsible for the remainder of her stay in the hospital.  Patient was made aware of this and does not want to transport home today. She is working with [facility] to have equipment delivered tomorrow to her home address. She is agreeable to  arranging transport for 10am tomorrow and would like to speak with physician. CM notified patient that CM cannot confirm if [facility] will be on time for 10 am transportation but this can be requested. MD notified and [facility] moved to 10:00 am tomorrow. RN notified. UAI copy provided to patient and copy with labels placed on bedside chart. 1618 - [facility] PCS, FS, H&P and completed transportation folder left on bedside chart. Completed [facility] magnet left outside room.       Nehemias Ngo, LIBBYN, RN    Care Management  [phone]
Care Management Initial Assessment       RUR: 22% \"high risk\"  Readmission? No  1st IM letter given? Yes   1st  letter given: No     Initial note: Chart reviewed for updates. CM met with pt at bedside, introduced role, confirmed demographics and discussed d/c planning. Pt lives with her nephew and son in a 1 level home with a ramp entrance at the front. Pt reports needing assistance with some ADL's at home. She reports that she gets help with ADL's from her son and nephew. Pt was recently admitted to [facility] and was discharged last week. Pt reports that she does not want to return there if she has to go SNF. Pt reports that she is open with  but does not remember the name of the [facility] agency. Pt uses [facility] pharmacy on [address]. CM discussed d/c planning with pt. Pt reports that she has been having issues with completing her ADL's at home. CM requested to know if plan is to go to rehab after she medically stable. Pt reported that she will think about it. CM requested to know decision as soon as possible so CM can start with sending referrals. Pt also reported that she tried to get a hospital bed via her PCP and that her PCP sent information to Medicare explaining need for hospital bed and that Medicare reported that they need for the PCP to go evaluate pt at the home. Pt reported that she notified Medicare that she is at the hospital and was informed that she can be evaluated for a hospital bed by the hospitalist. She is requesting for CM to request for MD to evaluate for a hospital bed and send progress notes to Medicare. Unit CM has been notified. Pt will need BLS transport at d/c.  Complete assessment is below:     09/29/23 4261   Service Assessment   Patient Orientation Alert and Oriented   Cognition Alert   History Provided By Patient   Primary Caregiver Family   Accompanied By/Relationship N/A   Support Systems Children;Family Members   Patient's Healthcare Decision Maker is: Legal Next
Decision from [place] returned and the physicians with [place] are in agreement with pt discharge. CM met with pt and made her aware. Per CM, pt was aware of the decision and requested transport be set tomorrow at 10A. CM made pt aware based on [facility] decision, pt would be financially responsible starting tomorrow at 12P.     CJ Marcos
MD notified of patient's request for hospital bed; CM suggested MD documenting the need for this in their note if they felt it was necessary. Supporting PT/OT notes can help with insurance approval if they feel a hospital bed is appropriate for patient.       Talita Colbert, BSN, RN    Care Management  [phone]
Medicare discharge appeal received from [facility]. Case control # [id number]. All requested notes, DND, and IMM letter was sent electronically to [facility]. Patient  given a copy of the DND and a copy was placed on patient's chart.  Will continue to monitor the status of the appeal.     CJ Okeefe
deadline to be out by 12pm today. She was not agreeable to paying for an alternate form of transportation at this time. She requested to speak with MD and patient advocate. CM and RN notified her of patient advocacy number. MD to be notified during morning IDRs. 80 - MD aware of patient's request for a CXR.    1225 - CM and CM Supervisor met with patient at bedside after she notified RN that she wants to go to [facility]. [facility]. Patient spoke with family and confirmed that her hospital bed has been delivered. Patient aware that therapy is recommending HH at this time. Patient agreeable to transporting home with estimated 12:45 pm transportation time. 10/04/23 613 [facility] Discharge   Transition of Care Consult (CM Consult) Arnold Garber  (At [facility] (accepted))   Internal Home Health No   Reason Outside Agency Chosen Patient already serviced by other home care/hospice agency   Services [address] Discharge PT;OT;Home Health   Mode of Transport at Discharge [facility])   Hospital Transport Time of Discharge 1000   Confirm Follow Up Transport Family   Condition of Participation: Discharge Planning   The Plan for Transition of Care is related to the following treatment goals: [address] [facility] - list provided to patient   The Patient and/or Patient Representative was provided with a Choice of Provider? Patient   The Patient and/Or Patient Representative agree with the Discharge Plan? Yes   Freedom of Choice list was provided with basic dialogue that supports the patient's individualized plan of care/goals, treatment preferences, and shares the quality data associated with the providers?   Yes         EMILY Dc, RN    Care Management  [phone]
Appeal   Discharge Appealed by Patient   Date notifed by [facility] of appeal request: 10/02/23  ([id number])         LIBBY SchwartzN, RN    Care Management  [phone]

## 2023-10-05 ENCOUNTER — APPOINTMENT (OUTPATIENT)
Facility: HOSPITAL | Age: 53
DRG: 189 | End: 2023-10-05
Payer: MEDICARE

## 2023-10-05 ENCOUNTER — HOSPITAL ENCOUNTER (INPATIENT)
Facility: HOSPITAL | Age: 53
LOS: 20 days | Discharge: HOME HEALTH | DRG: 189 | End: 2023-10-25
Attending: EMERGENCY MEDICINE | Admitting: INTERNAL MEDICINE
Payer: MEDICARE

## 2023-10-05 DIAGNOSIS — C79.51 CARCINOMA OF RIGHT BREAST METASTATIC TO BONE (HCC): ICD-10-CM

## 2023-10-05 DIAGNOSIS — C50.911 CARCINOMA OF RIGHT BREAST METASTATIC TO BONE (HCC): ICD-10-CM

## 2023-10-05 DIAGNOSIS — C50.919 CARCINOMA OF BREAST METASTATIC TO MULTIPLE SITES, UNSPECIFIED LATERALITY (HCC): ICD-10-CM

## 2023-10-05 DIAGNOSIS — M79.662 PAIN IN BOTH LOWER LEGS: ICD-10-CM

## 2023-10-05 DIAGNOSIS — J96.21 ACUTE AND CHRONIC RESPIRATORY FAILURE WITH HYPOXIA (HCC): ICD-10-CM

## 2023-10-05 DIAGNOSIS — G89.4 CHRONIC PAIN SYNDROME: ICD-10-CM

## 2023-10-05 DIAGNOSIS — E83.42 HYPOMAGNESEMIA: ICD-10-CM

## 2023-10-05 DIAGNOSIS — R00.0 TACHYCARDIA: Primary | ICD-10-CM

## 2023-10-05 DIAGNOSIS — M79.661 PAIN IN BOTH LOWER LEGS: ICD-10-CM

## 2023-10-05 PROBLEM — J96.20 ACUTE ON CHRONIC RESPIRATORY FAILURE (HCC): Status: ACTIVE | Noted: 2023-10-05

## 2023-10-05 LAB
ALBUMIN SERPL-MCNC: 2.8 G/DL (ref 3.5–5)
ALBUMIN/GLOB SERPL: 0.7 (ref 1.1–2.2)
ALP SERPL-CCNC: 396 U/L (ref 45–117)
ALT SERPL-CCNC: 71 U/L (ref 12–78)
ANION GAP BLD CALC-SCNC: 7 (ref 10–20)
ANION GAP SERPL CALC-SCNC: 6 MMOL/L (ref 5–15)
AST SERPL-CCNC: 42 U/L (ref 15–37)
BASE DEFICIT BLD-SCNC: 2 MMOL/L
BASOPHILS # BLD: 0.1 K/UL (ref 0–0.1)
BASOPHILS NFR BLD: 1 % (ref 0–1)
BILIRUB SERPL-MCNC: 0.6 MG/DL (ref 0.2–1)
BUN SERPL-MCNC: 7 MG/DL (ref 6–20)
BUN/CREAT SERPL: 9 (ref 12–20)
CA-I BLD-MCNC: 1.15 MMOL/L (ref 1.12–1.32)
CALCIUM SERPL-MCNC: 11.3 MG/DL (ref 8.5–10.1)
CHLORIDE BLD-SCNC: 111 MMOL/L (ref 100–108)
CHLORIDE SERPL-SCNC: 104 MMOL/L (ref 97–108)
CO2 BLD-SCNC: 24 MMOL/L (ref 19–24)
CO2 SERPL-SCNC: 28 MMOL/L (ref 21–32)
COMMENT:: NORMAL
CREAT SERPL-MCNC: 0.74 MG/DL (ref 0.55–1.02)
CREAT UR-MCNC: <0.3 MG/DL (ref 0.6–1.3)
DIFFERENTIAL METHOD BLD: ABNORMAL
EOSINOPHIL # BLD: 0.1 K/UL (ref 0–0.4)
EOSINOPHIL NFR BLD: 1 % (ref 0–7)
ERYTHROCYTE [DISTWIDTH] IN BLOOD BY AUTOMATED COUNT: 13.1 % (ref 11.5–14.5)
FLUAV AG NPH QL IA: NEGATIVE
FLUBV AG NOSE QL IA: NEGATIVE
GLOBULIN SER CALC-MCNC: 4.2 G/DL (ref 2–4)
GLUCOSE BLD STRIP.AUTO-MCNC: 124 MG/DL (ref 74–106)
GLUCOSE SERPL-MCNC: 203 MG/DL (ref 65–100)
HCO3 BLDA-SCNC: 24 MMOL/L
HCT VFR BLD AUTO: 40.4 % (ref 35–47)
HGB BLD-MCNC: 12.6 G/DL (ref 11.5–16)
IMM GRANULOCYTES # BLD AUTO: 0.1 K/UL (ref 0–0.04)
IMM GRANULOCYTES NFR BLD AUTO: 1 % (ref 0–0.5)
LACTATE BLD-SCNC: 1.09 MMOL/L (ref 0.4–2)
LACTATE BLD-SCNC: 2.12 MMOL/L (ref 0.4–2)
LYMPHOCYTES # BLD: 1.3 K/UL (ref 0.8–3.5)
LYMPHOCYTES NFR BLD: 14 % (ref 12–49)
MAGNESIUM SERPL-MCNC: 1.4 MG/DL (ref 1.6–2.4)
MCH RBC QN AUTO: 29.9 PG (ref 26–34)
MCHC RBC AUTO-ENTMCNC: 31.2 G/DL (ref 30–36.5)
MCV RBC AUTO: 95.7 FL (ref 80–99)
MONOCYTES # BLD: 1.1 K/UL (ref 0–1)
MONOCYTES NFR BLD: 11 % (ref 5–13)
NEUTS SEG # BLD: 7.1 K/UL (ref 1.8–8)
NEUTS SEG NFR BLD: 73 % (ref 32–75)
NRBC # BLD: 0.04 K/UL (ref 0–0.01)
NRBC BLD-RTO: 0.4 PER 100 WBC
NT PRO BNP: 306 PG/ML
PCO2 BLDV: 46.6 MMHG (ref 41–51)
PH BLDV: 7.32 (ref 7.32–7.42)
PLATELET # BLD AUTO: 135 K/UL (ref 150–400)
PMV BLD AUTO: 10.5 FL (ref 8.9–12.9)
PO2 BLDV: <27 MMHG (ref 25–40)
POTASSIUM BLD-SCNC: 2.7 MMOL/L (ref 3.5–5.5)
POTASSIUM SERPL-SCNC: 4.5 MMOL/L (ref 3.5–5.1)
PROT SERPL-MCNC: 7 G/DL (ref 6.4–8.2)
RBC # BLD AUTO: 4.22 M/UL (ref 3.8–5.2)
SARS-COV-2 RDRP RESP QL NAA+PROBE: NOT DETECTED
SERVICE CMNT-IMP: ABNORMAL
SODIUM BLD-SCNC: 142 MMOL/L (ref 136–145)
SODIUM SERPL-SCNC: 138 MMOL/L (ref 136–145)
SOURCE: NORMAL
SPECIMEN HOLD: NORMAL
SPECIMEN SITE: ABNORMAL
TROPONIN I SERPL HS-MCNC: 24 NG/L (ref 0–51)
WBC # BLD AUTO: 9.8 K/UL (ref 3.6–11)

## 2023-10-05 PROCEDURE — 71275 CT ANGIOGRAPHY CHEST: CPT

## 2023-10-05 PROCEDURE — 84295 ASSAY OF SERUM SODIUM: CPT

## 2023-10-05 PROCEDURE — 94762 N-INVAS EAR/PLS OXIMTRY CONT: CPT

## 2023-10-05 PROCEDURE — 87635 SARS-COV-2 COVID-19 AMP PRB: CPT

## 2023-10-05 PROCEDURE — 87804 INFLUENZA ASSAY W/OPTIC: CPT

## 2023-10-05 PROCEDURE — 82947 ASSAY GLUCOSE BLOOD QUANT: CPT

## 2023-10-05 PROCEDURE — 6360000002 HC RX W HCPCS: Performed by: INTERNAL MEDICINE

## 2023-10-05 PROCEDURE — 36415 COLL VENOUS BLD VENIPUNCTURE: CPT

## 2023-10-05 PROCEDURE — 82330 ASSAY OF CALCIUM: CPT

## 2023-10-05 PROCEDURE — 1100000000 HC RM PRIVATE

## 2023-10-05 PROCEDURE — 99285 EMERGENCY DEPT VISIT HI MDM: CPT

## 2023-10-05 PROCEDURE — 2580000003 HC RX 258: Performed by: INTERNAL MEDICINE

## 2023-10-05 PROCEDURE — 80053 COMPREHEN METABOLIC PANEL: CPT

## 2023-10-05 PROCEDURE — 6360000004 HC RX CONTRAST MEDICATION: Performed by: RADIOLOGY

## 2023-10-05 PROCEDURE — 71045 X-RAY EXAM CHEST 1 VIEW: CPT

## 2023-10-05 PROCEDURE — 83880 ASSAY OF NATRIURETIC PEPTIDE: CPT

## 2023-10-05 PROCEDURE — 6360000002 HC RX W HCPCS: Performed by: PHYSICIAN ASSISTANT

## 2023-10-05 PROCEDURE — 2700000000 HC OXYGEN THERAPY PER DAY

## 2023-10-05 PROCEDURE — 93005 ELECTROCARDIOGRAM TRACING: CPT | Performed by: EMERGENCY MEDICINE

## 2023-10-05 PROCEDURE — 85025 COMPLETE CBC W/AUTO DIFF WBC: CPT

## 2023-10-05 PROCEDURE — 6370000000 HC RX 637 (ALT 250 FOR IP): Performed by: PHYSICIAN ASSISTANT

## 2023-10-05 PROCEDURE — 83605 ASSAY OF LACTIC ACID: CPT

## 2023-10-05 PROCEDURE — 84132 ASSAY OF SERUM POTASSIUM: CPT

## 2023-10-05 PROCEDURE — 87186 SC STD MICRODIL/AGAR DIL: CPT

## 2023-10-05 PROCEDURE — 87077 CULTURE AEROBIC IDENTIFY: CPT

## 2023-10-05 PROCEDURE — 96374 THER/PROPH/DIAG INJ IV PUSH: CPT

## 2023-10-05 PROCEDURE — 87150 DNA/RNA AMPLIFIED PROBE: CPT

## 2023-10-05 PROCEDURE — 94640 AIRWAY INHALATION TREATMENT: CPT

## 2023-10-05 PROCEDURE — 6370000000 HC RX 637 (ALT 250 FOR IP): Performed by: INTERNAL MEDICINE

## 2023-10-05 PROCEDURE — 87040 BLOOD CULTURE FOR BACTERIA: CPT

## 2023-10-05 PROCEDURE — 84484 ASSAY OF TROPONIN QUANT: CPT

## 2023-10-05 PROCEDURE — 82803 BLOOD GASES ANY COMBINATION: CPT

## 2023-10-05 PROCEDURE — 83735 ASSAY OF MAGNESIUM: CPT

## 2023-10-05 RX ORDER — ASPIRIN 81 MG/1
81 TABLET, CHEWABLE ORAL DAILY
Status: DISCONTINUED | OUTPATIENT
Start: 2023-10-06 | End: 2023-10-25 | Stop reason: HOSPADM

## 2023-10-05 RX ORDER — INSULIN LISPRO 100 [IU]/ML
0-4 INJECTION, SOLUTION INTRAVENOUS; SUBCUTANEOUS NIGHTLY
Status: DISCONTINUED | OUTPATIENT
Start: 2023-10-05 | End: 2023-10-25 | Stop reason: HOSPADM

## 2023-10-05 RX ORDER — INSULIN GLARGINE 100 [IU]/ML
40 INJECTION, SOLUTION SUBCUTANEOUS DAILY
Status: DISCONTINUED | OUTPATIENT
Start: 2023-10-06 | End: 2023-10-25 | Stop reason: HOSPADM

## 2023-10-05 RX ORDER — POLYETHYLENE GLYCOL 3350 17 G/17G
17 POWDER, FOR SOLUTION ORAL DAILY
Status: DISCONTINUED | OUTPATIENT
Start: 2023-10-06 | End: 2023-10-16

## 2023-10-05 RX ORDER — HYDROMORPHONE HYDROCHLORIDE 2 MG/1
2 TABLET ORAL EVERY 4 HOURS PRN
Status: DISCONTINUED | OUTPATIENT
Start: 2023-10-05 | End: 2023-10-11

## 2023-10-05 RX ORDER — ANASTROZOLE 1 MG/1
1 TABLET ORAL DAILY
Status: DISCONTINUED | OUTPATIENT
Start: 2023-10-06 | End: 2023-10-25 | Stop reason: HOSPADM

## 2023-10-05 RX ORDER — MAGNESIUM SULFATE 1 G/100ML
1000 INJECTION INTRAVENOUS
Status: COMPLETED | OUTPATIENT
Start: 2023-10-05 | End: 2023-10-06

## 2023-10-05 RX ORDER — INSULIN LISPRO 100 [IU]/ML
0-16 INJECTION, SOLUTION INTRAVENOUS; SUBCUTANEOUS EVERY 4 HOURS
Status: DISCONTINUED | OUTPATIENT
Start: 2023-10-05 | End: 2023-10-06 | Stop reason: SDUPTHER

## 2023-10-05 RX ORDER — HYDROMORPHONE HYDROCHLORIDE 2 MG/1
2 TABLET ORAL
Status: COMPLETED | OUTPATIENT
Start: 2023-10-05 | End: 2023-10-05

## 2023-10-05 RX ORDER — INSULIN LISPRO 100 [IU]/ML
0-16 INJECTION, SOLUTION INTRAVENOUS; SUBCUTANEOUS
Status: DISCONTINUED | OUTPATIENT
Start: 2023-10-06 | End: 2023-10-25 | Stop reason: HOSPADM

## 2023-10-05 RX ORDER — ACETAMINOPHEN 650 MG/1
650 SUPPOSITORY RECTAL EVERY 6 HOURS PRN
Status: DISCONTINUED | OUTPATIENT
Start: 2023-10-05 | End: 2023-10-25 | Stop reason: HOSPADM

## 2023-10-05 RX ORDER — GUAIFENESIN/DEXTROMETHORPHAN 100-10MG/5
5 SYRUP ORAL EVERY 4 HOURS PRN
Status: DISCONTINUED | OUTPATIENT
Start: 2023-10-05 | End: 2023-10-25 | Stop reason: HOSPADM

## 2023-10-05 RX ORDER — ACETAMINOPHEN 325 MG/1
650 TABLET ORAL EVERY 6 HOURS PRN
Status: DISCONTINUED | OUTPATIENT
Start: 2023-10-05 | End: 2023-10-25 | Stop reason: HOSPADM

## 2023-10-05 RX ORDER — CYCLOBENZAPRINE HCL 10 MG
5 TABLET ORAL
Status: COMPLETED | OUTPATIENT
Start: 2023-10-05 | End: 2023-10-05

## 2023-10-05 RX ORDER — ACETAMINOPHEN 500 MG
1000 TABLET ORAL
Status: COMPLETED | OUTPATIENT
Start: 2023-10-05 | End: 2023-10-05

## 2023-10-05 RX ORDER — ONDANSETRON 2 MG/ML
4 INJECTION INTRAMUSCULAR; INTRAVENOUS EVERY 6 HOURS PRN
Status: DISCONTINUED | OUTPATIENT
Start: 2023-10-05 | End: 2023-10-25 | Stop reason: HOSPADM

## 2023-10-05 RX ORDER — SODIUM CHLORIDE 9 MG/ML
INJECTION, SOLUTION INTRAVENOUS PRN
Status: DISCONTINUED | OUTPATIENT
Start: 2023-10-05 | End: 2023-10-25 | Stop reason: HOSPADM

## 2023-10-05 RX ORDER — FUROSEMIDE 10 MG/ML
40 INJECTION INTRAMUSCULAR; INTRAVENOUS ONCE
Status: COMPLETED | OUTPATIENT
Start: 2023-10-05 | End: 2023-10-05

## 2023-10-05 RX ORDER — ATORVASTATIN CALCIUM 20 MG/1
20 TABLET, FILM COATED ORAL NIGHTLY
Status: DISCONTINUED | OUTPATIENT
Start: 2023-10-05 | End: 2023-10-25 | Stop reason: HOSPADM

## 2023-10-05 RX ORDER — LIDOCAINE 4 G/G
1 PATCH TOPICAL
Status: COMPLETED | OUTPATIENT
Start: 2023-10-05 | End: 2023-10-06

## 2023-10-05 RX ORDER — PREDNISONE 20 MG/1
40 TABLET ORAL DAILY
Status: DISCONTINUED | OUTPATIENT
Start: 2023-10-06 | End: 2023-10-09

## 2023-10-05 RX ORDER — IPRATROPIUM BROMIDE AND ALBUTEROL SULFATE 2.5; .5 MG/3ML; MG/3ML
1 SOLUTION RESPIRATORY (INHALATION)
Status: DISCONTINUED | OUTPATIENT
Start: 2023-10-05 | End: 2023-10-06

## 2023-10-05 RX ORDER — SODIUM CHLORIDE 0.9 % (FLUSH) 0.9 %
5-40 SYRINGE (ML) INJECTION PRN
Status: DISCONTINUED | OUTPATIENT
Start: 2023-10-05 | End: 2023-10-25 | Stop reason: HOSPADM

## 2023-10-05 RX ORDER — ONDANSETRON 4 MG/1
4 TABLET, ORALLY DISINTEGRATING ORAL EVERY 8 HOURS PRN
Status: DISCONTINUED | OUTPATIENT
Start: 2023-10-05 | End: 2023-10-25 | Stop reason: HOSPADM

## 2023-10-05 RX ORDER — DEXTROSE MONOHYDRATE 100 MG/ML
INJECTION, SOLUTION INTRAVENOUS CONTINUOUS PRN
Status: DISCONTINUED | OUTPATIENT
Start: 2023-10-05 | End: 2023-10-25 | Stop reason: HOSPADM

## 2023-10-05 RX ORDER — BISACODYL 10 MG
10 SUPPOSITORY, RECTAL RECTAL DAILY PRN
Status: DISCONTINUED | OUTPATIENT
Start: 2023-10-05 | End: 2023-10-17

## 2023-10-05 RX ORDER — SODIUM CHLORIDE 0.9 % (FLUSH) 0.9 %
5-40 SYRINGE (ML) INJECTION EVERY 12 HOURS SCHEDULED
Status: DISCONTINUED | OUTPATIENT
Start: 2023-10-05 | End: 2023-10-25 | Stop reason: HOSPADM

## 2023-10-05 RX ORDER — GUAIFENESIN 600 MG/1
600 TABLET, EXTENDED RELEASE ORAL 2 TIMES DAILY
Status: DISCONTINUED | OUTPATIENT
Start: 2023-10-05 | End: 2023-10-06

## 2023-10-05 RX ADMIN — MAGNESIUM SULFATE HEPTAHYDRATE 1000 MG: 1 INJECTION, SOLUTION INTRAVENOUS at 23:24

## 2023-10-05 RX ADMIN — IOPAMIDOL 100 ML: 755 INJECTION, SOLUTION INTRAVENOUS at 22:51

## 2023-10-05 RX ADMIN — ACETAMINOPHEN 500 MG: 500 TABLET ORAL at 20:33

## 2023-10-05 RX ADMIN — HYDROMORPHONE HYDROCHLORIDE 2 MG: 2 TABLET ORAL at 22:17

## 2023-10-05 RX ADMIN — FUROSEMIDE 40 MG: 10 INJECTION, SOLUTION INTRAMUSCULAR; INTRAVENOUS at 21:28

## 2023-10-05 RX ADMIN — GUAIFENESIN 600 MG: 600 TABLET, EXTENDED RELEASE ORAL at 23:24

## 2023-10-05 RX ADMIN — SODIUM CHLORIDE, PRESERVATIVE FREE 10 ML: 5 INJECTION INTRAVENOUS at 23:43

## 2023-10-05 RX ADMIN — CYCLOBENZAPRINE 5 MG: 10 TABLET, FILM COATED ORAL at 22:16

## 2023-10-05 RX ADMIN — IPRATROPIUM BROMIDE AND ALBUTEROL SULFATE 1 DOSE: .5; 3 SOLUTION RESPIRATORY (INHALATION) at 23:07

## 2023-10-05 RX ADMIN — ATORVASTATIN CALCIUM 20 MG: 20 TABLET, FILM COATED ORAL at 23:37

## 2023-10-05 RX ADMIN — WATER 2000 MG: 1 INJECTION INTRAMUSCULAR; INTRAVENOUS; SUBCUTANEOUS at 23:38

## 2023-10-05 RX ADMIN — APIXABAN 10 MG: 5 TABLET, FILM COATED ORAL at 23:24

## 2023-10-05 ASSESSMENT — PAIN - FUNCTIONAL ASSESSMENT: PAIN_FUNCTIONAL_ASSESSMENT: 0-10

## 2023-10-05 ASSESSMENT — PAIN SCALES - GENERAL: PAINLEVEL_OUTOF10: 8

## 2023-10-05 ASSESSMENT — PAIN DESCRIPTION - LOCATION: LOCATION: CHEST;BACK

## 2023-10-06 LAB
ACCESSION NUMBER, LLC1M: ABNORMAL
ACINETOBACTER CALCOAC BAUMANNII COMPLEX BY PCR: NOT DETECTED
ALBUMIN SERPL-MCNC: 2.7 G/DL (ref 3.5–5)
ALBUMIN/GLOB SERPL: 0.7 (ref 1.1–2.2)
ALP SERPL-CCNC: 378 U/L (ref 45–117)
ALT SERPL-CCNC: 61 U/L (ref 12–78)
ANION GAP SERPL CALC-SCNC: 6 MMOL/L (ref 5–15)
APPEARANCE UR: CLEAR
AST SERPL-CCNC: 28 U/L (ref 15–37)
B FRAGILIS DNA BLD POS QL NAA+NON-PROBE: NOT DETECTED
BACTERIA URNS QL MICRO: NEGATIVE /HPF
BASOPHILS # BLD: 0.1 K/UL (ref 0–0.1)
BASOPHILS NFR BLD: 1 % (ref 0–1)
BILIRUB SERPL-MCNC: 0.8 MG/DL (ref 0.2–1)
BILIRUB UR QL: NEGATIVE
BIOFIRE TEST COMMENT: ABNORMAL
BLAKPC BLD POS QL NAA+NON-PROBE: NOT DETECTED
BUN SERPL-MCNC: 8 MG/DL (ref 6–20)
BUN/CREAT SERPL: 11 (ref 12–20)
C ALBICANS DNA BLD POS QL NAA+NON-PROBE: NOT DETECTED
C AURIS DNA BLD POS QL NAA+NON-PROBE: NOT DETECTED
C GATTII+NEOFOR DNA BLD POS QL NAA+N-PRB: NOT DETECTED
C GLABRATA DNA BLD POS QL NAA+NON-PROBE: NOT DETECTED
C KRUSEI DNA BLD POS QL NAA+NON-PROBE: NOT DETECTED
C PARAP DNA BLD POS QL NAA+NON-PROBE: NOT DETECTED
CALCIUM SERPL-MCNC: 10.1 MG/DL (ref 8.5–10.1)
CALCIUM SERPL-MCNC: 10.8 MG/DL (ref 8.5–10.1)
CHLORIDE SERPL-SCNC: 101 MMOL/L (ref 97–108)
CO2 SERPL-SCNC: 30 MMOL/L (ref 21–32)
COLOR UR: ABNORMAL
CREAT SERPL-MCNC: 0.71 MG/DL (ref 0.55–1.02)
DIFFERENTIAL METHOD BLD: ABNORMAL
E CLOAC COMP DNA BLD POS NAA+NON-PROBE: NOT DETECTED
E COLI DNA BLD POS QL NAA+NON-PROBE: NOT DETECTED
E FAECALIS DNA BLD POS QL NAA+NON-PROBE: NOT DETECTED
E FAECIUM DNA BLD POS QL NAA+NON-PROBE: NOT DETECTED
ENTEROBACTERALES DNA BLD POS NAA+N-PRB: NOT DETECTED
EOSINOPHIL # BLD: 0.1 K/UL (ref 0–0.4)
EOSINOPHIL NFR BLD: 1 % (ref 0–7)
EPITH CASTS URNS QL MICRO: ABNORMAL /LPF
ERYTHROCYTE [DISTWIDTH] IN BLOOD BY AUTOMATED COUNT: 13.2 % (ref 11.5–14.5)
EST. AVERAGE GLUCOSE BLD GHB EST-MCNC: 137 MG/DL
GLOBULIN SER CALC-MCNC: 4 G/DL (ref 2–4)
GLUCOSE BLD STRIP.AUTO-MCNC: 169 MG/DL (ref 65–117)
GLUCOSE BLD STRIP.AUTO-MCNC: 180 MG/DL (ref 65–117)
GLUCOSE BLD STRIP.AUTO-MCNC: 222 MG/DL (ref 65–117)
GLUCOSE BLD STRIP.AUTO-MCNC: 230 MG/DL (ref 65–117)
GLUCOSE BLD STRIP.AUTO-MCNC: 326 MG/DL (ref 65–117)
GLUCOSE SERPL-MCNC: 187 MG/DL (ref 65–100)
GLUCOSE UR STRIP.AUTO-MCNC: NEGATIVE MG/DL
GP B STREP DNA BLD POS QL NAA+NON-PROBE: NOT DETECTED
HAEM INFLU DNA BLD POS QL NAA+NON-PROBE: NOT DETECTED
HBA1C MFR BLD: 6.4 % (ref 4–5.6)
HCT VFR BLD AUTO: 39.2 % (ref 35–47)
HGB BLD-MCNC: 11.8 G/DL (ref 11.5–16)
HGB UR QL STRIP: ABNORMAL
HYALINE CASTS URNS QL MICRO: ABNORMAL /LPF (ref 0–2)
IMM GRANULOCYTES # BLD AUTO: 0.1 K/UL (ref 0–0.04)
IMM GRANULOCYTES NFR BLD AUTO: 1 % (ref 0–0.5)
K OXYTOCA DNA BLD POS QL NAA+NON-PROBE: NOT DETECTED
KETONES UR QL STRIP.AUTO: NEGATIVE MG/DL
KLEBSIELLA SP DNA BLD POS QL NAA+NON-PRB: NOT DETECTED
KLEBSIELLA SP DNA BLD POS QL NAA+NON-PRB: NOT DETECTED
L MONOCYTOG DNA BLD POS QL NAA+NON-PROBE: NOT DETECTED
LEUKOCYTE ESTERASE UR QL STRIP.AUTO: NEGATIVE
LYMPHOCYTES # BLD: 0.9 K/UL (ref 0.8–3.5)
LYMPHOCYTES NFR BLD: 9 % (ref 12–49)
MAGNESIUM SERPL-MCNC: 1.7 MG/DL (ref 1.6–2.4)
MCH RBC QN AUTO: 29.5 PG (ref 26–34)
MCHC RBC AUTO-ENTMCNC: 30.1 G/DL (ref 30–36.5)
MCV RBC AUTO: 98 FL (ref 80–99)
MONOCYTES # BLD: 1.7 K/UL (ref 0–1)
MONOCYTES NFR BLD: 17 % (ref 5–13)
N MEN DNA BLD POS QL NAA+NON-PROBE: NOT DETECTED
NEUTS SEG # BLD: 7.1 K/UL (ref 1.8–8)
NEUTS SEG NFR BLD: 72 % (ref 32–75)
NITRITE UR QL STRIP.AUTO: NEGATIVE
NRBC # BLD: 0.05 K/UL (ref 0–0.01)
NRBC BLD-RTO: 0.5 PER 100 WBC
P AERUGINOSA DNA BLD POS NAA+NON-PROBE: NOT DETECTED
PH UR STRIP: 6.5 (ref 5–8)
PLATELET # BLD AUTO: 141 K/UL (ref 150–400)
PMV BLD AUTO: 10.1 FL (ref 8.9–12.9)
POTASSIUM SERPL-SCNC: 4 MMOL/L (ref 3.5–5.1)
PROCALCITONIN SERPL-MCNC: 0.26 NG/ML
PROT SERPL-MCNC: 6.7 G/DL (ref 6.4–8.2)
PROT UR STRIP-MCNC: NEGATIVE MG/DL
PROTEUS SP DNA BLD POS QL NAA+NON-PROBE: NOT DETECTED
PTH-INTACT SERPL-MCNC: 121.1 PG/ML (ref 18.4–88)
RBC # BLD AUTO: 4 M/UL (ref 3.8–5.2)
RBC #/AREA URNS HPF: ABNORMAL /HPF (ref 0–5)
RESISTANT GENE TARGETS: ABNORMAL
S AUREUS DNA BLD POS QL NAA+NON-PROBE: NOT DETECTED
S AUREUS+CONS DNA BLD POS NAA+NON-PROBE: DETECTED
S EPIDERMIDIS DNA BLD POS QL NAA+NON-PRB: NOT DETECTED
S LUGDUNENSIS DNA BLD POS QL NAA+NON-PRB: NOT DETECTED
S MALTOPHILIA DNA BLD POS QL NAA+NON-PRB: NOT DETECTED
S MARCESCENS DNA BLD POS NAA+NON-PROBE: NOT DETECTED
S PNEUM DNA BLD POS QL NAA+NON-PROBE: NOT DETECTED
S PYO DNA BLD POS QL NAA+NON-PROBE: NOT DETECTED
SALMONELLA DNA BLD POS QL NAA+NON-PROBE: NOT DETECTED
SERVICE CMNT-IMP: ABNORMAL
SODIUM SERPL-SCNC: 137 MMOL/L (ref 136–145)
SP GR UR REFRACTOMETRY: 1.02
STREPTOCOCCUS DNA BLD POS NAA+NON-PROBE: NOT DETECTED
TROPONIN I SERPL HS-MCNC: 28 NG/L (ref 0–51)
URINE CULTURE IF INDICATED: ABNORMAL
UROBILINOGEN UR QL STRIP.AUTO: 1 EU/DL (ref 0.2–1)
VANA+VANB BLD POS QL NAA+NON-PROBE: NOT DETECTED
WBC # BLD AUTO: 9.9 K/UL (ref 3.6–11)
WBC URNS QL MICRO: ABNORMAL /HPF (ref 0–4)

## 2023-10-06 PROCEDURE — 6370000000 HC RX 637 (ALT 250 FOR IP): Performed by: INTERNAL MEDICINE

## 2023-10-06 PROCEDURE — 36415 COLL VENOUS BLD VENIPUNCTURE: CPT

## 2023-10-06 PROCEDURE — 84145 PROCALCITONIN (PCT): CPT

## 2023-10-06 PROCEDURE — 2700000000 HC OXYGEN THERAPY PER DAY

## 2023-10-06 PROCEDURE — 83735 ASSAY OF MAGNESIUM: CPT

## 2023-10-06 PROCEDURE — 84484 ASSAY OF TROPONIN QUANT: CPT

## 2023-10-06 PROCEDURE — 2580000003 HC RX 258: Performed by: INTERNAL MEDICINE

## 2023-10-06 PROCEDURE — 6360000002 HC RX W HCPCS: Performed by: INTERNAL MEDICINE

## 2023-10-06 PROCEDURE — 87205 SMEAR GRAM STAIN: CPT

## 2023-10-06 PROCEDURE — 94640 AIRWAY INHALATION TREATMENT: CPT

## 2023-10-06 PROCEDURE — 85025 COMPLETE CBC W/AUTO DIFF WBC: CPT

## 2023-10-06 PROCEDURE — 2060000000 HC ICU INTERMEDIATE R&B

## 2023-10-06 PROCEDURE — 81001 URINALYSIS AUTO W/SCOPE: CPT

## 2023-10-06 PROCEDURE — 80053 COMPREHEN METABOLIC PANEL: CPT

## 2023-10-06 PROCEDURE — 87070 CULTURE OTHR SPECIMN AEROBIC: CPT

## 2023-10-06 PROCEDURE — 83970 ASSAY OF PARATHORMONE: CPT

## 2023-10-06 PROCEDURE — 6370000000 HC RX 637 (ALT 250 FOR IP): Performed by: NURSE PRACTITIONER

## 2023-10-06 PROCEDURE — 82962 GLUCOSE BLOOD TEST: CPT

## 2023-10-06 PROCEDURE — 83036 HEMOGLOBIN GLYCOSYLATED A1C: CPT

## 2023-10-06 RX ORDER — METHOCARBAMOL 750 MG/1
750 TABLET, FILM COATED ORAL 3 TIMES DAILY
Status: DISCONTINUED | OUTPATIENT
Start: 2023-10-06 | End: 2023-10-25 | Stop reason: HOSPADM

## 2023-10-06 RX ORDER — CYCLOBENZAPRINE HCL 10 MG
10 TABLET ORAL
Status: COMPLETED | OUTPATIENT
Start: 2023-10-06 | End: 2023-10-06

## 2023-10-06 RX ORDER — METRONIDAZOLE 500 MG/100ML
500 INJECTION, SOLUTION INTRAVENOUS EVERY 8 HOURS
Status: COMPLETED | OUTPATIENT
Start: 2023-10-06 | End: 2023-10-13

## 2023-10-06 RX ORDER — LIDOCAINE 4 G/G
1 PATCH TOPICAL DAILY
Status: DISCONTINUED | OUTPATIENT
Start: 2023-10-06 | End: 2023-10-25 | Stop reason: HOSPADM

## 2023-10-06 RX ORDER — GUAIFENESIN 600 MG/1
1200 TABLET, EXTENDED RELEASE ORAL 2 TIMES DAILY
Status: DISCONTINUED | OUTPATIENT
Start: 2023-10-06 | End: 2023-10-25 | Stop reason: HOSPADM

## 2023-10-06 RX ORDER — FUROSEMIDE 10 MG/ML
40 INJECTION INTRAMUSCULAR; INTRAVENOUS DAILY
Status: DISCONTINUED | OUTPATIENT
Start: 2023-10-06 | End: 2023-10-22

## 2023-10-06 RX ORDER — METRONIDAZOLE 500 MG/100ML
500 INJECTION, SOLUTION INTRAVENOUS EVERY 8 HOURS
Status: DISCONTINUED | OUTPATIENT
Start: 2023-10-06 | End: 2023-10-06

## 2023-10-06 RX ORDER — ALBUTEROL SULFATE 2.5 MG/3ML
2.5 SOLUTION RESPIRATORY (INHALATION) EVERY 4 HOURS PRN
Status: DISCONTINUED | OUTPATIENT
Start: 2023-10-06 | End: 2023-10-25 | Stop reason: HOSPADM

## 2023-10-06 RX ADMIN — IPRATROPIUM BROMIDE 0.5 MG: 0.5 SOLUTION RESPIRATORY (INHALATION) at 15:17

## 2023-10-06 RX ADMIN — ASPIRIN 81 MG: 81 TABLET, CHEWABLE ORAL at 10:23

## 2023-10-06 RX ADMIN — IPRATROPIUM BROMIDE 0.5 MG: 0.5 SOLUTION RESPIRATORY (INHALATION) at 07:20

## 2023-10-06 RX ADMIN — CEFEPIME 2000 MG: 2 INJECTION, POWDER, FOR SOLUTION INTRAVENOUS at 23:32

## 2023-10-06 RX ADMIN — ATORVASTATIN CALCIUM 20 MG: 20 TABLET, FILM COATED ORAL at 21:11

## 2023-10-06 RX ADMIN — Medication 2500 MG: at 02:22

## 2023-10-06 RX ADMIN — METHOCARBAMOL TABLETS 750 MG: 750 TABLET, COATED ORAL at 13:03

## 2023-10-06 RX ADMIN — APIXABAN 10 MG: 5 TABLET, FILM COATED ORAL at 10:23

## 2023-10-06 RX ADMIN — METHOCARBAMOL TABLETS 750 MG: 750 TABLET, COATED ORAL at 10:22

## 2023-10-06 RX ADMIN — PREDNISONE 40 MG: 20 TABLET ORAL at 12:40

## 2023-10-06 RX ADMIN — IPRATROPIUM BROMIDE 0.5 MG: 0.5 SOLUTION RESPIRATORY (INHALATION) at 19:35

## 2023-10-06 RX ADMIN — GUAIFENESIN 600 MG: 600 TABLET, EXTENDED RELEASE ORAL at 10:23

## 2023-10-06 RX ADMIN — SODIUM CHLORIDE, PRESERVATIVE FREE 10 ML: 5 INJECTION INTRAVENOUS at 10:34

## 2023-10-06 RX ADMIN — INSULIN GLARGINE 40 UNITS: 100 INJECTION, SOLUTION SUBCUTANEOUS at 10:45

## 2023-10-06 RX ADMIN — INSULIN LISPRO 4 UNITS: 100 INJECTION, SOLUTION INTRAVENOUS; SUBCUTANEOUS at 17:23

## 2023-10-06 RX ADMIN — CYCLOBENZAPRINE 5 MG: 10 TABLET, FILM COATED ORAL at 22:40

## 2023-10-06 RX ADMIN — CEFEPIME 2000 MG: 2 INJECTION, POWDER, FOR SOLUTION INTRAVENOUS at 16:03

## 2023-10-06 RX ADMIN — METRONIDAZOLE 500 MG: 500 INJECTION, SOLUTION INTRAVENOUS at 10:27

## 2023-10-06 RX ADMIN — METRONIDAZOLE 500 MG: 500 INJECTION, SOLUTION INTRAVENOUS at 16:36

## 2023-10-06 RX ADMIN — ARFORMOTEROL TARTRATE: 15 SOLUTION RESPIRATORY (INHALATION) at 19:40

## 2023-10-06 RX ADMIN — INSULIN LISPRO 4 UNITS: 100 INJECTION, SOLUTION INTRAVENOUS; SUBCUTANEOUS at 09:45

## 2023-10-06 RX ADMIN — GUAIFENESIN 1200 MG: 600 TABLET, EXTENDED RELEASE ORAL at 21:11

## 2023-10-06 RX ADMIN — ANASTROZOLE 1 MG: 1 TABLET, COATED ORAL at 12:41

## 2023-10-06 RX ADMIN — INSULIN LISPRO 4 UNITS: 100 INJECTION, SOLUTION INTRAVENOUS; SUBCUTANEOUS at 21:13

## 2023-10-06 RX ADMIN — CEFEPIME 2000 MG: 2 INJECTION, POWDER, FOR SOLUTION INTRAVENOUS at 06:29

## 2023-10-06 RX ADMIN — VANCOMYCIN HYDROCHLORIDE 1500 MG: 1.5 INJECTION, POWDER, LYOPHILIZED, FOR SOLUTION INTRAVENOUS at 12:37

## 2023-10-06 RX ADMIN — HYDROMORPHONE HYDROCHLORIDE 2 MG: 2 TABLET ORAL at 09:52

## 2023-10-06 RX ADMIN — METOPROLOL TARTRATE 25 MG: 25 TABLET, FILM COATED ORAL at 02:23

## 2023-10-06 RX ADMIN — METOPROLOL TARTRATE 25 MG: 25 TABLET, FILM COATED ORAL at 21:12

## 2023-10-06 RX ADMIN — APIXABAN 10 MG: 5 TABLET, FILM COATED ORAL at 21:11

## 2023-10-06 RX ADMIN — ACETAMINOPHEN 650 MG: 325 TABLET ORAL at 13:03

## 2023-10-06 RX ADMIN — SODIUM CHLORIDE, PRESERVATIVE FREE 10 ML: 5 INJECTION INTRAVENOUS at 21:17

## 2023-10-06 RX ADMIN — HYDROMORPHONE HYDROCHLORIDE 2 MG: 2 TABLET ORAL at 21:16

## 2023-10-06 RX ADMIN — METOPROLOL TARTRATE 25 MG: 25 TABLET, FILM COATED ORAL at 10:23

## 2023-10-06 RX ADMIN — FUROSEMIDE 40 MG: 10 INJECTION, SOLUTION INTRAMUSCULAR; INTRAVENOUS at 10:32

## 2023-10-06 RX ADMIN — ARFORMOTEROL TARTRATE: 15 SOLUTION RESPIRATORY (INHALATION) at 07:34

## 2023-10-06 ASSESSMENT — PAIN DESCRIPTION - DESCRIPTORS
DESCRIPTORS: ACHING

## 2023-10-06 ASSESSMENT — PAIN DESCRIPTION - ORIENTATION
ORIENTATION: POSTERIOR
ORIENTATION: RIGHT;LEFT
ORIENTATION: POSTERIOR

## 2023-10-06 ASSESSMENT — PAIN DESCRIPTION - LOCATION
LOCATION: BACK
LOCATION: BACK;LEG
LOCATION: BACK;CHEST
LOCATION: BACK
LOCATION: BACK;LEG
LOCATION: BACK;CHEST

## 2023-10-06 ASSESSMENT — PAIN DESCRIPTION - PAIN TYPE
TYPE: CHRONIC PAIN

## 2023-10-06 ASSESSMENT — PAIN SCALES - GENERAL
PAINLEVEL_OUTOF10: 5
PAINLEVEL_OUTOF10: 0
PAINLEVEL_OUTOF10: 8
PAINLEVEL_OUTOF10: 5
PAINLEVEL_OUTOF10: 5
PAINLEVEL_OUTOF10: 4
PAINLEVEL_OUTOF10: 6
PAINLEVEL_OUTOF10: 4
PAINLEVEL_OUTOF10: 0
PAINLEVEL_OUTOF10: 0

## 2023-10-06 NOTE — ED NOTES
This RN answered call light. Pt reporting the purewick failed and urine has filled her brief. Will gather ED staff to assist with clean up and change.         Ryland Coyne RN  10/05/23 3959

## 2023-10-06 NOTE — ED PROVIDER NOTES
[facility] EMERGENCY DEPT  EMERGENCY DEPARTMENT ENCOUNTER       Pt Name: Raad Felix  MRN: [id number]  [address] [facility] 1970  Date of evaluation: 10/5/2023  Provider: Leticia Holstein, PA   PCP: Elsy Pack MD  Note Started: 8:13 PM EDT 10/5/23     CHIEF COMPLAINT       Chief Complaint   Patient presents with    Shortness of Breath     Pt was dc here yday after dx with PE, pt still c/o difficulty breathing and productive cough. [facility] called 911 due to concern for fluid in lungs, pt's lasix was discontinued while in hospital. Wears 5L at baseline and been taking nebs at home w no relief. HISTORY OF PRESENT ILLNESS: 1 or more elements      History From: Patient  None     Raad Felix is a 48 y.o. female metastatic breast cancer followed by heme-onc, PE on Eliquis, chronic respiratory failure on 5 L baseline, who presents to the ED for evaluation of gradually worsening shortness of breath. Patient recently admitted for a PE, and was discharged home yesterday. Patient endorses gradually worsening shortness of breath and chest tightness. States she had been previously on Lasix, and they took her off of that when she was diagnosed with a PE, and she states she feels as though she has increased fluid. Also endorses increased productive cough, denies hemoptysis. Denies fevers. Tolerating PO well, no changes in bowel or bladder habits. Denies abdominal pain, N/V/D, urinary symptoms, rashes or LOC. Denies any known sick contacts     Nursing Notes were all reviewed and agreed with or any disagreements were addressed in the HPI. REVIEW OF SYSTEMS      Review of Systems   All other systems reviewed and are negative. Positives and Pertinent negatives as per HPI.     PAST HISTORY     Past Medical History:  Past Medical History:   Diagnosis Date    Arthritis     Asthma     Breast lump     CAD (coronary artery disease)     Cancer (HCC)     breast cancer    Congestive heart failure (HCC)     COPD

## 2023-10-06 NOTE — H&P
Redemonstrated cluster of right breast nodules measuring up to 25 mm. MEDIASTINUM: No mass or lymphadenopathy. AASHISH: No mass or lymphadenopathy. THORACIC AORTA: No aneurysm. Atherosclerosis. HEART: Cardiomegaly. No evidence of right heart strain. Coronary artery calcification. Left ventricular papillary muscle calcifications. ESOPHAGUS: No wall thickening or dilatation. TRACHEA/BRONCHI: Patent. PLEURA: No effusion or pneumothorax. LUNGS: No nodule, mass, or airspace disease. UPPER ABDOMEN: Partially imaged. Multiple nonobstructing left mid to lower renal calculi. . BONES: Widespread sclerotic osseous metastatic disease. Several healing bilateral rib fractures. Degenerative changes. 1.  Right upper lobe embolus extending into segmental branches. . No evidence of right heart strain. 2.  Ongoing widespread osseous metastatic disease. Several healing bilateral rib fractures. 3.  Redemonstrated right breast nodules, correlate with any available mammographic imaging. I discussed critical findings with Dena Lama MD at 746 hours on 9/28/2023.       _______________________________________________________________________    TOTAL TIME:  68 Minutes    Critical Care Provided     Minutes non procedure based    Signed: Luke Valenzuela MD    Procedures: see electronic medical records for all procedures/Xrays and details which were not copied into this note but were reviewed prior to creation of Plan.

## 2023-10-06 NOTE — ED NOTES
0006 Comprehensive telephone SBAR given [facility]. All questions answered. Nicola8 Marylou ([facility] RN) and [facility]ney) at bedside to transport the patient with [employer]. Additional bedside report provided. Care transitioned at this time.         Demetri Bingham RN  10/06/23 9942

## 2023-10-06 NOTE — ED NOTES
Right lateral 20G access obtained by Tabitha Hays, ED Tech and then infiltrated nearly immediately. Third attempt made.       Kamala Elizabeth, RN  10/05/23 4728

## 2023-10-07 LAB
ANION GAP SERPL CALC-SCNC: 2 MMOL/L (ref 5–15)
BUN SERPL-MCNC: 14 MG/DL (ref 6–20)
BUN/CREAT SERPL: 17 (ref 12–20)
CALCIUM SERPL-MCNC: 11 MG/DL (ref 8.5–10.1)
CHLORIDE SERPL-SCNC: 100 MMOL/L (ref 97–108)
CO2 SERPL-SCNC: 30 MMOL/L (ref 21–32)
CREAT SERPL-MCNC: 0.82 MG/DL (ref 0.55–1.02)
DATE LAST DOSE: ABNORMAL
DOSE AMOUNT: ABNORMAL UNITS
DOSE DATE/TIME: ABNORMAL
EKG ATRIAL RATE: 133 BPM
EKG DIAGNOSIS: NORMAL
EKG P AXIS: 78 DEGREES
EKG P-R INTERVAL: 170 MS
EKG Q-T INTERVAL: 272 MS
EKG QRS DURATION: 76 MS
EKG QTC CALCULATION (BAZETT): 404 MS
EKG R AXIS: 112 DEGREES
EKG T AXIS: 66 DEGREES
EKG VENTRICULAR RATE: 133 BPM
GLUCOSE BLD STRIP.AUTO-MCNC: 165 MG/DL (ref 65–117)
GLUCOSE BLD STRIP.AUTO-MCNC: 260 MG/DL (ref 65–117)
GLUCOSE BLD STRIP.AUTO-MCNC: 262 MG/DL (ref 65–117)
GLUCOSE BLD STRIP.AUTO-MCNC: 269 MG/DL (ref 65–117)
GLUCOSE SERPL-MCNC: 230 MG/DL (ref 65–100)
POTASSIUM SERPL-SCNC: 5 MMOL/L (ref 3.5–5.1)
SERVICE CMNT-IMP: ABNORMAL
SODIUM SERPL-SCNC: 132 MMOL/L (ref 136–145)
VANCOMYCIN TROUGH SERPL-MCNC: 16 UG/ML (ref 5–10)

## 2023-10-07 PROCEDURE — 6360000002 HC RX W HCPCS: Performed by: INTERNAL MEDICINE

## 2023-10-07 PROCEDURE — 82962 GLUCOSE BLOOD TEST: CPT

## 2023-10-07 PROCEDURE — 94640 AIRWAY INHALATION TREATMENT: CPT

## 2023-10-07 PROCEDURE — 6370000000 HC RX 637 (ALT 250 FOR IP): Performed by: NURSE PRACTITIONER

## 2023-10-07 PROCEDURE — 97535 SELF CARE MNGMENT TRAINING: CPT

## 2023-10-07 PROCEDURE — 6370000000 HC RX 637 (ALT 250 FOR IP): Performed by: INTERNAL MEDICINE

## 2023-10-07 PROCEDURE — 80048 BASIC METABOLIC PNL TOTAL CA: CPT

## 2023-10-07 PROCEDURE — 36415 COLL VENOUS BLD VENIPUNCTURE: CPT

## 2023-10-07 PROCEDURE — 2060000000 HC ICU INTERMEDIATE R&B

## 2023-10-07 PROCEDURE — 2700000000 HC OXYGEN THERAPY PER DAY

## 2023-10-07 PROCEDURE — 80202 ASSAY OF VANCOMYCIN: CPT

## 2023-10-07 PROCEDURE — 97165 OT EVAL LOW COMPLEX 30 MIN: CPT

## 2023-10-07 PROCEDURE — 76937 US GUIDE VASCULAR ACCESS: CPT

## 2023-10-07 PROCEDURE — 2580000003 HC RX 258: Performed by: INTERNAL MEDICINE

## 2023-10-07 RX ORDER — CYCLOBENZAPRINE HCL 10 MG
5 TABLET ORAL 3 TIMES DAILY
Status: DISCONTINUED | OUTPATIENT
Start: 2023-10-07 | End: 2023-10-25 | Stop reason: HOSPADM

## 2023-10-07 RX ADMIN — METOPROLOL TARTRATE 25 MG: 25 TABLET, FILM COATED ORAL at 21:32

## 2023-10-07 RX ADMIN — CEFEPIME 2000 MG: 2 INJECTION, POWDER, FOR SOLUTION INTRAVENOUS at 06:43

## 2023-10-07 RX ADMIN — APIXABAN 5 MG: 5 TABLET, FILM COATED ORAL at 21:30

## 2023-10-07 RX ADMIN — METHOCARBAMOL TABLETS 750 MG: 750 TABLET, COATED ORAL at 08:22

## 2023-10-07 RX ADMIN — HYDROMORPHONE HYDROCHLORIDE 2 MG: 2 TABLET ORAL at 17:20

## 2023-10-07 RX ADMIN — INSULIN LISPRO 8 UNITS: 100 INJECTION, SOLUTION INTRAVENOUS; SUBCUTANEOUS at 12:23

## 2023-10-07 RX ADMIN — METHOCARBAMOL TABLETS 750 MG: 750 TABLET, COATED ORAL at 02:35

## 2023-10-07 RX ADMIN — IPRATROPIUM BROMIDE 0.5 MG: 0.5 SOLUTION RESPIRATORY (INHALATION) at 07:33

## 2023-10-07 RX ADMIN — METRONIDAZOLE 500 MG: 500 INJECTION, SOLUTION INTRAVENOUS at 03:37

## 2023-10-07 RX ADMIN — HYDROMORPHONE HYDROCHLORIDE 2 MG: 2 TABLET ORAL at 08:45

## 2023-10-07 RX ADMIN — VANCOMYCIN HYDROCHLORIDE 1500 MG: 1.5 INJECTION, POWDER, LYOPHILIZED, FOR SOLUTION INTRAVENOUS at 04:39

## 2023-10-07 RX ADMIN — PREDNISONE 40 MG: 20 TABLET ORAL at 08:22

## 2023-10-07 RX ADMIN — METHOCARBAMOL TABLETS 750 MG: 750 TABLET, COATED ORAL at 14:04

## 2023-10-07 RX ADMIN — METOPROLOL TARTRATE 25 MG: 25 TABLET, FILM COATED ORAL at 08:22

## 2023-10-07 RX ADMIN — INSULIN GLARGINE 40 UNITS: 100 INJECTION, SOLUTION SUBCUTANEOUS at 08:45

## 2023-10-07 RX ADMIN — METRONIDAZOLE 500 MG: 500 INJECTION, SOLUTION INTRAVENOUS at 08:30

## 2023-10-07 RX ADMIN — VANCOMYCIN HYDROCHLORIDE 1500 MG: 1.5 INJECTION, POWDER, LYOPHILIZED, FOR SOLUTION INTRAVENOUS at 12:09

## 2023-10-07 RX ADMIN — MICONAZOLE NITRATE: 20 CREAM TOPICAL at 08:46

## 2023-10-07 RX ADMIN — CEFEPIME 2000 MG: 2 INJECTION, POWDER, FOR SOLUTION INTRAVENOUS at 14:03

## 2023-10-07 RX ADMIN — METRONIDAZOLE 500 MG: 500 INJECTION, SOLUTION INTRAVENOUS at 15:43

## 2023-10-07 RX ADMIN — FUROSEMIDE 40 MG: 10 INJECTION, SOLUTION INTRAMUSCULAR; INTRAVENOUS at 08:24

## 2023-10-07 RX ADMIN — ANASTROZOLE 1 MG: 1 TABLET, COATED ORAL at 08:46

## 2023-10-07 RX ADMIN — SODIUM CHLORIDE, PRESERVATIVE FREE 10 ML: 5 INJECTION INTRAVENOUS at 21:35

## 2023-10-07 RX ADMIN — INSULIN LISPRO 8 UNITS: 100 INJECTION, SOLUTION INTRAVENOUS; SUBCUTANEOUS at 17:18

## 2023-10-07 RX ADMIN — APIXABAN 5 MG: 5 TABLET, FILM COATED ORAL at 08:22

## 2023-10-07 RX ADMIN — ARFORMOTEROL TARTRATE: 15 SOLUTION RESPIRATORY (INHALATION) at 07:33

## 2023-10-07 RX ADMIN — ASPIRIN 81 MG: 81 TABLET, CHEWABLE ORAL at 08:22

## 2023-10-07 RX ADMIN — ATORVASTATIN CALCIUM 20 MG: 20 TABLET, FILM COATED ORAL at 21:31

## 2023-10-07 RX ADMIN — ACETAMINOPHEN 650 MG: 325 TABLET ORAL at 02:35

## 2023-10-07 RX ADMIN — GUAIFENESIN 1200 MG: 600 TABLET, EXTENDED RELEASE ORAL at 21:32

## 2023-10-07 RX ADMIN — SODIUM CHLORIDE, PRESERVATIVE FREE 10 ML: 5 INJECTION INTRAVENOUS at 08:28

## 2023-10-07 RX ADMIN — CYCLOBENZAPRINE 5 MG: 10 TABLET, FILM COATED ORAL at 21:31

## 2023-10-07 RX ADMIN — CEFEPIME 2000 MG: 2 INJECTION, POWDER, FOR SOLUTION INTRAVENOUS at 21:35

## 2023-10-07 RX ADMIN — GUAIFENESIN 1200 MG: 600 TABLET, EXTENDED RELEASE ORAL at 08:22

## 2023-10-07 ASSESSMENT — PAIN DESCRIPTION - ORIENTATION
ORIENTATION: POSTERIOR
ORIENTATION: RIGHT;MID

## 2023-10-07 ASSESSMENT — PAIN SCALES - GENERAL
PAINLEVEL_OUTOF10: 7
PAINLEVEL_OUTOF10: 2
PAINLEVEL_OUTOF10: 4
PAINLEVEL_OUTOF10: 6
PAINLEVEL_OUTOF10: 0
PAINLEVEL_OUTOF10: 7
PAINLEVEL_OUTOF10: 6

## 2023-10-07 ASSESSMENT — PAIN DESCRIPTION - PAIN TYPE: TYPE: CHRONIC PAIN

## 2023-10-07 ASSESSMENT — PAIN DESCRIPTION - LOCATION
LOCATION: BACK;LEG
LOCATION: BACK
LOCATION: BACK
LOCATION: BACK;LEG

## 2023-10-07 ASSESSMENT — PAIN DESCRIPTION - DESCRIPTORS
DESCRIPTORS: ACHING
DESCRIPTORS: SHARP;THROBBING
DESCRIPTORS: ACHING

## 2023-10-08 LAB
ANION GAP SERPL CALC-SCNC: 3 MMOL/L (ref 5–15)
BACTERIA SPEC CULT: ABNORMAL
BACTERIA SPEC CULT: ABNORMAL
BACTERIA SPEC CULT: NORMAL
BASOPHILS # BLD: 0 K/UL (ref 0–0.1)
BASOPHILS NFR BLD: 0 % (ref 0–1)
BUN SERPL-MCNC: 18 MG/DL (ref 6–20)
BUN/CREAT SERPL: 20 (ref 12–20)
CALCIUM SERPL-MCNC: 10.5 MG/DL (ref 8.5–10.1)
CHLORIDE SERPL-SCNC: 100 MMOL/L (ref 97–108)
CO2 SERPL-SCNC: 31 MMOL/L (ref 21–32)
CREAT SERPL-MCNC: 0.9 MG/DL (ref 0.55–1.02)
DIFFERENTIAL METHOD BLD: ABNORMAL
EOSINOPHIL # BLD: 0 K/UL (ref 0–0.4)
EOSINOPHIL NFR BLD: 0 % (ref 0–7)
ERYTHROCYTE [DISTWIDTH] IN BLOOD BY AUTOMATED COUNT: 12.9 % (ref 11.5–14.5)
GLUCOSE BLD STRIP.AUTO-MCNC: 157 MG/DL (ref 65–117)
GLUCOSE BLD STRIP.AUTO-MCNC: 165 MG/DL (ref 65–117)
GLUCOSE BLD STRIP.AUTO-MCNC: 212 MG/DL (ref 65–117)
GLUCOSE BLD STRIP.AUTO-MCNC: 318 MG/DL (ref 65–117)
GLUCOSE BLD STRIP.AUTO-MCNC: 362 MG/DL (ref 65–117)
GLUCOSE SERPL-MCNC: 229 MG/DL (ref 65–100)
GRAM STN SPEC: NORMAL
GRAM STN SPEC: NORMAL
HCT VFR BLD AUTO: 37.4 % (ref 35–47)
HGB BLD-MCNC: 11.6 G/DL (ref 11.5–16)
IMM GRANULOCYTES # BLD AUTO: 0.2 K/UL (ref 0–0.04)
IMM GRANULOCYTES NFR BLD AUTO: 2 % (ref 0–0.5)
LYMPHOCYTES # BLD: 1 K/UL (ref 0.8–3.5)
LYMPHOCYTES NFR BLD: 10 % (ref 12–49)
MCH RBC QN AUTO: 30.2 PG (ref 26–34)
MCHC RBC AUTO-ENTMCNC: 31 G/DL (ref 30–36.5)
MCV RBC AUTO: 97.4 FL (ref 80–99)
MONOCYTES # BLD: 1.2 K/UL (ref 0–1)
MONOCYTES NFR BLD: 12 % (ref 5–13)
NEUTS SEG # BLD: 7.4 K/UL (ref 1.8–8)
NEUTS SEG NFR BLD: 76 % (ref 32–75)
NRBC # BLD: 0.03 K/UL (ref 0–0.01)
NRBC BLD-RTO: 0.3 PER 100 WBC
PLATELET # BLD AUTO: 188 K/UL (ref 150–400)
PMV BLD AUTO: 10.4 FL (ref 8.9–12.9)
POTASSIUM SERPL-SCNC: 3.9 MMOL/L (ref 3.5–5.1)
RBC # BLD AUTO: 3.84 M/UL (ref 3.8–5.2)
SERVICE CMNT-IMP: ABNORMAL
SERVICE CMNT-IMP: NORMAL
SODIUM SERPL-SCNC: 134 MMOL/L (ref 136–145)
WBC # BLD AUTO: 9.9 K/UL (ref 3.6–11)

## 2023-10-08 PROCEDURE — 6370000000 HC RX 637 (ALT 250 FOR IP): Performed by: INTERNAL MEDICINE

## 2023-10-08 PROCEDURE — 6360000002 HC RX W HCPCS: Performed by: INTERNAL MEDICINE

## 2023-10-08 PROCEDURE — 94640 AIRWAY INHALATION TREATMENT: CPT

## 2023-10-08 PROCEDURE — 85025 COMPLETE CBC W/AUTO DIFF WBC: CPT

## 2023-10-08 PROCEDURE — 36415 COLL VENOUS BLD VENIPUNCTURE: CPT

## 2023-10-08 PROCEDURE — 80048 BASIC METABOLIC PNL TOTAL CA: CPT

## 2023-10-08 PROCEDURE — 82962 GLUCOSE BLOOD TEST: CPT

## 2023-10-08 PROCEDURE — 87040 BLOOD CULTURE FOR BACTERIA: CPT

## 2023-10-08 PROCEDURE — 2700000000 HC OXYGEN THERAPY PER DAY

## 2023-10-08 PROCEDURE — 2580000003 HC RX 258: Performed by: INTERNAL MEDICINE

## 2023-10-08 PROCEDURE — 2060000000 HC ICU INTERMEDIATE R&B

## 2023-10-08 PROCEDURE — 6370000000 HC RX 637 (ALT 250 FOR IP): Performed by: NURSE PRACTITIONER

## 2023-10-08 RX ORDER — DOCUSATE SODIUM 100 MG/1
100 CAPSULE, LIQUID FILLED ORAL DAILY
Status: DISCONTINUED | OUTPATIENT
Start: 2023-10-08 | End: 2023-10-09

## 2023-10-08 RX ADMIN — METRONIDAZOLE 500 MG: 500 INJECTION, SOLUTION INTRAVENOUS at 16:09

## 2023-10-08 RX ADMIN — GUAIFENESIN 1200 MG: 600 TABLET, EXTENDED RELEASE ORAL at 21:45

## 2023-10-08 RX ADMIN — APIXABAN 5 MG: 5 TABLET, FILM COATED ORAL at 08:41

## 2023-10-08 RX ADMIN — CYCLOBENZAPRINE 5 MG: 10 TABLET, FILM COATED ORAL at 08:40

## 2023-10-08 RX ADMIN — ATORVASTATIN CALCIUM 20 MG: 20 TABLET, FILM COATED ORAL at 21:45

## 2023-10-08 RX ADMIN — HYDROMORPHONE HYDROCHLORIDE 2 MG: 2 TABLET ORAL at 08:40

## 2023-10-08 RX ADMIN — CEFEPIME 2000 MG: 2 INJECTION, POWDER, FOR SOLUTION INTRAVENOUS at 06:05

## 2023-10-08 RX ADMIN — HYDROMORPHONE HYDROCHLORIDE 2 MG: 2 TABLET ORAL at 13:07

## 2023-10-08 RX ADMIN — MICONAZOLE NITRATE: 20 CREAM TOPICAL at 21:53

## 2023-10-08 RX ADMIN — ASPIRIN 81 MG: 81 TABLET, CHEWABLE ORAL at 08:41

## 2023-10-08 RX ADMIN — VANCOMYCIN HYDROCHLORIDE 1250 MG: 10 INJECTION, POWDER, LYOPHILIZED, FOR SOLUTION INTRAVENOUS at 17:17

## 2023-10-08 RX ADMIN — VANCOMYCIN HYDROCHLORIDE 1500 MG: 1.5 INJECTION, POWDER, LYOPHILIZED, FOR SOLUTION INTRAVENOUS at 03:05

## 2023-10-08 RX ADMIN — SODIUM CHLORIDE, PRESERVATIVE FREE 10 ML: 5 INJECTION INTRAVENOUS at 08:47

## 2023-10-08 RX ADMIN — PREDNISONE 40 MG: 20 TABLET ORAL at 08:41

## 2023-10-08 RX ADMIN — METRONIDAZOLE 500 MG: 500 INJECTION, SOLUTION INTRAVENOUS at 08:41

## 2023-10-08 RX ADMIN — ANASTROZOLE 1 MG: 1 TABLET, COATED ORAL at 08:39

## 2023-10-08 RX ADMIN — ARFORMOTEROL TARTRATE: 15 SOLUTION RESPIRATORY (INHALATION) at 08:29

## 2023-10-08 RX ADMIN — MICONAZOLE NITRATE: 20 CREAM TOPICAL at 01:07

## 2023-10-08 RX ADMIN — CYCLOBENZAPRINE 5 MG: 10 TABLET, FILM COATED ORAL at 16:09

## 2023-10-08 RX ADMIN — DOCUSATE SODIUM 100 MG: 100 CAPSULE, LIQUID FILLED ORAL at 16:09

## 2023-10-08 RX ADMIN — IPRATROPIUM BROMIDE 0.5 MG: 0.5 SOLUTION RESPIRATORY (INHALATION) at 20:40

## 2023-10-08 RX ADMIN — INSULIN GLARGINE 40 UNITS: 100 INJECTION, SOLUTION SUBCUTANEOUS at 08:39

## 2023-10-08 RX ADMIN — ARFORMOTEROL TARTRATE: 15 SOLUTION RESPIRATORY (INHALATION) at 20:40

## 2023-10-08 RX ADMIN — CEFEPIME 2000 MG: 2 INJECTION, POWDER, FOR SOLUTION INTRAVENOUS at 14:03

## 2023-10-08 RX ADMIN — GUAIFENESIN 1200 MG: 600 TABLET, EXTENDED RELEASE ORAL at 08:41

## 2023-10-08 RX ADMIN — INSULIN LISPRO 5 UNITS: 100 INJECTION, SOLUTION INTRAVENOUS; SUBCUTANEOUS at 21:42

## 2023-10-08 RX ADMIN — APIXABAN 5 MG: 5 TABLET, FILM COATED ORAL at 21:45

## 2023-10-08 RX ADMIN — IPRATROPIUM BROMIDE 0.5 MG: 0.5 SOLUTION RESPIRATORY (INHALATION) at 08:24

## 2023-10-08 RX ADMIN — ACETAMINOPHEN 650 MG: 325 TABLET ORAL at 18:57

## 2023-10-08 RX ADMIN — FUROSEMIDE 40 MG: 10 INJECTION, SOLUTION INTRAMUSCULAR; INTRAVENOUS at 08:41

## 2023-10-08 RX ADMIN — MICONAZOLE NITRATE: 20 CREAM TOPICAL at 13:08

## 2023-10-08 RX ADMIN — INSULIN LISPRO 12 UNITS: 100 INJECTION, SOLUTION INTRAVENOUS; SUBCUTANEOUS at 17:17

## 2023-10-08 RX ADMIN — METOPROLOL TARTRATE 25 MG: 25 TABLET, FILM COATED ORAL at 21:46

## 2023-10-08 RX ADMIN — INSULIN LISPRO 2 UNITS: 100 INJECTION, SOLUTION INTRAVENOUS; SUBCUTANEOUS at 12:00

## 2023-10-08 RX ADMIN — CEFEPIME 2000 MG: 2 INJECTION, POWDER, FOR SOLUTION INTRAVENOUS at 21:44

## 2023-10-08 RX ADMIN — CYCLOBENZAPRINE 5 MG: 10 TABLET, FILM COATED ORAL at 21:44

## 2023-10-08 RX ADMIN — METHOCARBAMOL TABLETS 750 MG: 750 TABLET, COATED ORAL at 14:00

## 2023-10-08 RX ADMIN — ACETAMINOPHEN 650 MG: 325 TABLET ORAL at 01:05

## 2023-10-08 RX ADMIN — METRONIDAZOLE 500 MG: 500 INJECTION, SOLUTION INTRAVENOUS at 01:39

## 2023-10-08 RX ADMIN — METOPROLOL TARTRATE 25 MG: 25 TABLET, FILM COATED ORAL at 08:40

## 2023-10-08 RX ADMIN — METHOCARBAMOL TABLETS 750 MG: 750 TABLET, COATED ORAL at 01:05

## 2023-10-08 ASSESSMENT — PAIN DESCRIPTION - DESCRIPTORS
DESCRIPTORS: ACHING
DESCRIPTORS: CRUSHING

## 2023-10-08 ASSESSMENT — PAIN SCALES - GENERAL
PAINLEVEL_OUTOF10: 3
PAINLEVEL_OUTOF10: 3
PAINLEVEL_OUTOF10: 6
PAINLEVEL_OUTOF10: 2
PAINLEVEL_OUTOF10: 6
PAINLEVEL_OUTOF10: 5
PAINLEVEL_OUTOF10: 7
PAINLEVEL_OUTOF10: 4

## 2023-10-08 ASSESSMENT — PAIN DESCRIPTION - ORIENTATION
ORIENTATION: RIGHT
ORIENTATION: MID
ORIENTATION: RIGHT

## 2023-10-08 ASSESSMENT — PAIN DESCRIPTION - FREQUENCY: FREQUENCY: CONTINUOUS

## 2023-10-08 ASSESSMENT — PAIN DESCRIPTION - LOCATION
LOCATION: BACK;LEG;HIP
LOCATION: BACK
LOCATION: BACK;LEG
LOCATION: BACK;LEG

## 2023-10-08 ASSESSMENT — PAIN DESCRIPTION - PAIN TYPE
TYPE: CHRONIC PAIN
TYPE: CHRONIC PAIN

## 2023-10-08 ASSESSMENT — PAIN - FUNCTIONAL ASSESSMENT: PAIN_FUNCTIONAL_ASSESSMENT: PREVENTS OR INTERFERES WITH ALL ACTIVE AND SOME PASSIVE ACTIVITIES

## 2023-10-09 LAB
ANION GAP SERPL CALC-SCNC: 2 MMOL/L (ref 5–15)
BACTERIA SPEC CULT: ABNORMAL
BASOPHILS # BLD: 0.1 K/UL (ref 0–0.1)
BASOPHILS NFR BLD: 1 % (ref 0–1)
BUN SERPL-MCNC: 19 MG/DL (ref 6–20)
BUN/CREAT SERPL: 21 (ref 12–20)
CALCIUM SERPL-MCNC: 9.6 MG/DL (ref 8.5–10.1)
CHLORIDE SERPL-SCNC: 101 MMOL/L (ref 97–108)
CO2 SERPL-SCNC: 34 MMOL/L (ref 21–32)
CREAT SERPL-MCNC: 0.91 MG/DL (ref 0.55–1.02)
DIFFERENTIAL METHOD BLD: ABNORMAL
EOSINOPHIL # BLD: 0 K/UL (ref 0–0.4)
EOSINOPHIL NFR BLD: 0 % (ref 0–7)
ERYTHROCYTE [DISTWIDTH] IN BLOOD BY AUTOMATED COUNT: 12.9 % (ref 11.5–14.5)
GLUCOSE BLD STRIP.AUTO-MCNC: 149 MG/DL (ref 65–117)
GLUCOSE BLD STRIP.AUTO-MCNC: 184 MG/DL (ref 65–117)
GLUCOSE BLD STRIP.AUTO-MCNC: 263 MG/DL (ref 65–117)
GLUCOSE BLD STRIP.AUTO-MCNC: 325 MG/DL (ref 65–117)
GLUCOSE SERPL-MCNC: 234 MG/DL (ref 65–100)
HCT VFR BLD AUTO: 35 % (ref 35–47)
HGB BLD-MCNC: 10.8 G/DL (ref 11.5–16)
IMM GRANULOCYTES # BLD AUTO: 0.2 K/UL (ref 0–0.04)
IMM GRANULOCYTES NFR BLD AUTO: 2 % (ref 0–0.5)
LYMPHOCYTES # BLD: 1 K/UL (ref 0.8–3.5)
LYMPHOCYTES NFR BLD: 11 % (ref 12–49)
MCH RBC QN AUTO: 29.8 PG (ref 26–34)
MCHC RBC AUTO-ENTMCNC: 30.9 G/DL (ref 30–36.5)
MCV RBC AUTO: 96.7 FL (ref 80–99)
MONOCYTES # BLD: 1.4 K/UL (ref 0–1)
MONOCYTES NFR BLD: 15 % (ref 5–13)
NEUTS SEG # BLD: 6.8 K/UL (ref 1.8–8)
NEUTS SEG NFR BLD: 71 % (ref 32–75)
NRBC # BLD: 0.02 K/UL (ref 0–0.01)
NRBC BLD-RTO: 0.2 PER 100 WBC
PLATELET # BLD AUTO: 209 K/UL (ref 150–400)
PMV BLD AUTO: 10.3 FL (ref 8.9–12.9)
POTASSIUM SERPL-SCNC: 3.8 MMOL/L (ref 3.5–5.1)
RBC # BLD AUTO: 3.62 M/UL (ref 3.8–5.2)
RBC MORPH BLD: ABNORMAL
SERVICE CMNT-IMP: ABNORMAL
SODIUM SERPL-SCNC: 137 MMOL/L (ref 136–145)
WBC # BLD AUTO: 9.5 K/UL (ref 3.6–11)

## 2023-10-09 PROCEDURE — 99222 1ST HOSP IP/OBS MODERATE 55: CPT | Performed by: INTERNAL MEDICINE

## 2023-10-09 PROCEDURE — 2060000000 HC ICU INTERMEDIATE R&B

## 2023-10-09 PROCEDURE — 6360000002 HC RX W HCPCS: Performed by: INTERNAL MEDICINE

## 2023-10-09 PROCEDURE — 36415 COLL VENOUS BLD VENIPUNCTURE: CPT

## 2023-10-09 PROCEDURE — 6370000000 HC RX 637 (ALT 250 FOR IP): Performed by: INTERNAL MEDICINE

## 2023-10-09 PROCEDURE — 6370000000 HC RX 637 (ALT 250 FOR IP): Performed by: NURSE PRACTITIONER

## 2023-10-09 PROCEDURE — 2580000003 HC RX 258: Performed by: INTERNAL MEDICINE

## 2023-10-09 PROCEDURE — 85025 COMPLETE CBC W/AUTO DIFF WBC: CPT

## 2023-10-09 PROCEDURE — 80048 BASIC METABOLIC PNL TOTAL CA: CPT

## 2023-10-09 PROCEDURE — 2700000000 HC OXYGEN THERAPY PER DAY

## 2023-10-09 PROCEDURE — 82962 GLUCOSE BLOOD TEST: CPT

## 2023-10-09 PROCEDURE — 94640 AIRWAY INHALATION TREATMENT: CPT

## 2023-10-09 RX ORDER — SENNA AND DOCUSATE SODIUM 50; 8.6 MG/1; MG/1
2 TABLET, FILM COATED ORAL DAILY PRN
Status: DISCONTINUED | OUTPATIENT
Start: 2023-10-09 | End: 2023-10-14

## 2023-10-09 RX ORDER — PREDNISONE 20 MG/1
20 TABLET ORAL DAILY
Status: DISCONTINUED | OUTPATIENT
Start: 2023-10-10 | End: 2023-10-16

## 2023-10-09 RX ORDER — ACETAMINOPHEN 325 MG/1
650 TABLET ORAL 2 TIMES DAILY
Status: DISCONTINUED | OUTPATIENT
Start: 2023-10-09 | End: 2023-10-14

## 2023-10-09 RX ADMIN — HYDROMORPHONE HYDROCHLORIDE 2 MG: 2 TABLET ORAL at 21:50

## 2023-10-09 RX ADMIN — INSULIN GLARGINE 40 UNITS: 100 INJECTION, SOLUTION SUBCUTANEOUS at 09:12

## 2023-10-09 RX ADMIN — ARFORMOTEROL TARTRATE: 15 SOLUTION RESPIRATORY (INHALATION) at 20:45

## 2023-10-09 RX ADMIN — FUROSEMIDE 40 MG: 10 INJECTION, SOLUTION INTRAMUSCULAR; INTRAVENOUS at 09:13

## 2023-10-09 RX ADMIN — MICONAZOLE NITRATE: 20 CREAM TOPICAL at 21:00

## 2023-10-09 RX ADMIN — HYDROMORPHONE HYDROCHLORIDE 2 MG: 2 TABLET ORAL at 06:48

## 2023-10-09 RX ADMIN — METRONIDAZOLE 500 MG: 500 INJECTION, SOLUTION INTRAVENOUS at 00:46

## 2023-10-09 RX ADMIN — METRONIDAZOLE 500 MG: 500 INJECTION, SOLUTION INTRAVENOUS at 17:28

## 2023-10-09 RX ADMIN — VANCOMYCIN HYDROCHLORIDE 1250 MG: 10 INJECTION, POWDER, LYOPHILIZED, FOR SOLUTION INTRAVENOUS at 04:24

## 2023-10-09 RX ADMIN — APIXABAN 5 MG: 5 TABLET, FILM COATED ORAL at 21:42

## 2023-10-09 RX ADMIN — CYCLOBENZAPRINE 5 MG: 10 TABLET, FILM COATED ORAL at 17:28

## 2023-10-09 RX ADMIN — METOPROLOL TARTRATE 25 MG: 25 TABLET, FILM COATED ORAL at 21:42

## 2023-10-09 RX ADMIN — CYCLOBENZAPRINE 5 MG: 10 TABLET, FILM COATED ORAL at 21:42

## 2023-10-09 RX ADMIN — SODIUM CHLORIDE, PRESERVATIVE FREE 10 ML: 5 INJECTION INTRAVENOUS at 09:26

## 2023-10-09 RX ADMIN — METRONIDAZOLE 500 MG: 500 INJECTION, SOLUTION INTRAVENOUS at 09:12

## 2023-10-09 RX ADMIN — ASPIRIN 81 MG: 81 TABLET, CHEWABLE ORAL at 09:18

## 2023-10-09 RX ADMIN — HYDROMORPHONE HYDROCHLORIDE 2 MG: 2 TABLET ORAL at 14:51

## 2023-10-09 RX ADMIN — CEFEPIME 2000 MG: 2 INJECTION, POWDER, FOR SOLUTION INTRAVENOUS at 21:41

## 2023-10-09 RX ADMIN — INSULIN LISPRO 8 UNITS: 100 INJECTION, SOLUTION INTRAVENOUS; SUBCUTANEOUS at 17:34

## 2023-10-09 RX ADMIN — APIXABAN 5 MG: 5 TABLET, FILM COATED ORAL at 09:18

## 2023-10-09 RX ADMIN — MICONAZOLE NITRATE: 20 CREAM TOPICAL at 09:27

## 2023-10-09 RX ADMIN — IPRATROPIUM BROMIDE 0.5 MG: 0.5 SOLUTION RESPIRATORY (INHALATION) at 07:50

## 2023-10-09 RX ADMIN — HYDROMORPHONE HYDROCHLORIDE 2 MG: 2 TABLET ORAL at 10:56

## 2023-10-09 RX ADMIN — IPRATROPIUM BROMIDE 0.5 MG: 0.5 SOLUTION RESPIRATORY (INHALATION) at 20:45

## 2023-10-09 RX ADMIN — DAPTOMYCIN 800 MG: 500 INJECTION, POWDER, LYOPHILIZED, FOR SOLUTION INTRAVENOUS at 13:31

## 2023-10-09 RX ADMIN — ARFORMOTEROL TARTRATE: 15 SOLUTION RESPIRATORY (INHALATION) at 08:00

## 2023-10-09 RX ADMIN — PREDNISONE 40 MG: 20 TABLET ORAL at 09:18

## 2023-10-09 RX ADMIN — GUAIFENESIN 1200 MG: 600 TABLET, EXTENDED RELEASE ORAL at 09:19

## 2023-10-09 RX ADMIN — CEFEPIME 2000 MG: 2 INJECTION, POWDER, FOR SOLUTION INTRAVENOUS at 14:41

## 2023-10-09 RX ADMIN — ACETAMINOPHEN 650 MG: 325 TABLET ORAL at 14:40

## 2023-10-09 RX ADMIN — CYCLOBENZAPRINE 5 MG: 10 TABLET, FILM COATED ORAL at 09:18

## 2023-10-09 RX ADMIN — METHOCARBAMOL TABLETS 750 MG: 750 TABLET, COATED ORAL at 09:17

## 2023-10-09 RX ADMIN — CEFEPIME 2000 MG: 2 INJECTION, POWDER, FOR SOLUTION INTRAVENOUS at 06:26

## 2023-10-09 RX ADMIN — METOPROLOL TARTRATE 25 MG: 25 TABLET, FILM COATED ORAL at 09:17

## 2023-10-09 RX ADMIN — INSULIN LISPRO 4 UNITS: 100 INJECTION, SOLUTION INTRAVENOUS; SUBCUTANEOUS at 21:43

## 2023-10-09 RX ADMIN — ANASTROZOLE 1 MG: 1 TABLET, COATED ORAL at 09:19

## 2023-10-09 RX ADMIN — GUAIFENESIN 1200 MG: 600 TABLET, EXTENDED RELEASE ORAL at 21:42

## 2023-10-09 RX ADMIN — METHOCARBAMOL TABLETS 750 MG: 750 TABLET, COATED ORAL at 00:43

## 2023-10-09 RX ADMIN — ACETAMINOPHEN 650 MG: 325 TABLET ORAL at 03:08

## 2023-10-09 ASSESSMENT — PAIN DESCRIPTION - LOCATION
LOCATION: LEG
LOCATION: HIP
LOCATION: GENERALIZED
LOCATION: GENERALIZED
LOCATION: LEG
LOCATION: GENERALIZED

## 2023-10-09 ASSESSMENT — PAIN DESCRIPTION - ORIENTATION
ORIENTATION: LEFT
ORIENTATION: RIGHT
ORIENTATION: LEFT

## 2023-10-09 ASSESSMENT — PAIN DESCRIPTION - DESCRIPTORS
DESCRIPTORS: ACHING
DESCRIPTORS: SHOOTING;STABBING
DESCRIPTORS: ACHING
DESCRIPTORS: SORE

## 2023-10-09 ASSESSMENT — PAIN SCALES - GENERAL
PAINLEVEL_OUTOF10: 6
PAINLEVEL_OUTOF10: 5
PAINLEVEL_OUTOF10: 7
PAINLEVEL_OUTOF10: 6
PAINLEVEL_OUTOF10: 5
PAINLEVEL_OUTOF10: 6
PAINLEVEL_OUTOF10: 3
PAINLEVEL_OUTOF10: 4

## 2023-10-10 PROBLEM — Z71.89 DNR (DO NOT RESUSCITATE) DISCUSSION: Status: ACTIVE | Noted: 2023-10-10

## 2023-10-10 LAB
ANION GAP SERPL CALC-SCNC: 3 MMOL/L (ref 5–15)
BACTERIA SPEC CULT: NORMAL
BACTERIA SPEC CULT: NORMAL
BASOPHILS # BLD: 0.1 K/UL (ref 0–0.1)
BASOPHILS NFR BLD: 1 % (ref 0–1)
BUN SERPL-MCNC: 20 MG/DL (ref 6–20)
BUN/CREAT SERPL: 24 (ref 12–20)
CALCIUM SERPL-MCNC: 10 MG/DL (ref 8.5–10.1)
CHLORIDE SERPL-SCNC: 102 MMOL/L (ref 97–108)
CK SERPL-CCNC: 14 U/L (ref 26–192)
CO2 SERPL-SCNC: 33 MMOL/L (ref 21–32)
CREAT SERPL-MCNC: 0.82 MG/DL (ref 0.55–1.02)
DIFFERENTIAL METHOD BLD: ABNORMAL
EOSINOPHIL # BLD: 0.1 K/UL (ref 0–0.4)
EOSINOPHIL NFR BLD: 1 % (ref 0–7)
ERYTHROCYTE [DISTWIDTH] IN BLOOD BY AUTOMATED COUNT: 12.9 % (ref 11.5–14.5)
GLUCOSE BLD STRIP.AUTO-MCNC: 149 MG/DL (ref 65–117)
GLUCOSE BLD STRIP.AUTO-MCNC: 191 MG/DL (ref 65–117)
GLUCOSE BLD STRIP.AUTO-MCNC: 219 MG/DL (ref 65–117)
GLUCOSE BLD STRIP.AUTO-MCNC: 349 MG/DL (ref 65–117)
GLUCOSE SERPL-MCNC: 180 MG/DL (ref 65–100)
HCT VFR BLD AUTO: 36.3 % (ref 35–47)
HGB BLD-MCNC: 11.1 G/DL (ref 11.5–16)
IMM GRANULOCYTES # BLD AUTO: 0 K/UL (ref 0–0.04)
IMM GRANULOCYTES NFR BLD AUTO: 0 % (ref 0–0.5)
LYMPHOCYTES # BLD: 1.2 K/UL (ref 0.8–3.5)
LYMPHOCYTES NFR BLD: 12 % (ref 12–49)
MCH RBC QN AUTO: 30.1 PG (ref 26–34)
MCHC RBC AUTO-ENTMCNC: 30.6 G/DL (ref 30–36.5)
MCV RBC AUTO: 98.4 FL (ref 80–99)
MONOCYTES # BLD: 1.5 K/UL (ref 0–1)
MONOCYTES NFR BLD: 15 % (ref 5–13)
MYELOCYTES NFR BLD MANUAL: 2 %
NEUTS SEG # BLD: 6.8 K/UL (ref 1.8–8)
NEUTS SEG NFR BLD: 69 % (ref 32–75)
NRBC # BLD: 0.02 K/UL (ref 0–0.01)
NRBC BLD-RTO: 0.2 PER 100 WBC
PLATELET # BLD AUTO: 228 K/UL (ref 150–400)
PMV BLD AUTO: 10.2 FL (ref 8.9–12.9)
POTASSIUM SERPL-SCNC: 3.9 MMOL/L (ref 3.5–5.1)
RBC # BLD AUTO: 3.69 M/UL (ref 3.8–5.2)
RBC MORPH BLD: ABNORMAL
RBC MORPH BLD: ABNORMAL
SERVICE CMNT-IMP: ABNORMAL
SERVICE CMNT-IMP: NORMAL
SODIUM SERPL-SCNC: 138 MMOL/L (ref 136–145)
WBC # BLD AUTO: 9.9 K/UL (ref 3.6–11)

## 2023-10-10 PROCEDURE — 6370000000 HC RX 637 (ALT 250 FOR IP): Performed by: INTERNAL MEDICINE

## 2023-10-10 PROCEDURE — 6360000002 HC RX W HCPCS: Performed by: INTERNAL MEDICINE

## 2023-10-10 PROCEDURE — 2580000003 HC RX 258: Performed by: INTERNAL MEDICINE

## 2023-10-10 PROCEDURE — 94640 AIRWAY INHALATION TREATMENT: CPT

## 2023-10-10 PROCEDURE — 82962 GLUCOSE BLOOD TEST: CPT

## 2023-10-10 PROCEDURE — 82550 ASSAY OF CK (CPK): CPT

## 2023-10-10 PROCEDURE — 6370000000 HC RX 637 (ALT 250 FOR IP): Performed by: NURSE PRACTITIONER

## 2023-10-10 PROCEDURE — 1100000000 HC RM PRIVATE

## 2023-10-10 PROCEDURE — 85025 COMPLETE CBC W/AUTO DIFF WBC: CPT

## 2023-10-10 PROCEDURE — 80048 BASIC METABOLIC PNL TOTAL CA: CPT

## 2023-10-10 PROCEDURE — 99233 SBSQ HOSP IP/OBS HIGH 50: CPT | Performed by: INTERNAL MEDICINE

## 2023-10-10 PROCEDURE — 36415 COLL VENOUS BLD VENIPUNCTURE: CPT

## 2023-10-10 RX ORDER — IPRATROPIUM BROMIDE AND ALBUTEROL SULFATE 2.5; .5 MG/3ML; MG/3ML
1 SOLUTION RESPIRATORY (INHALATION) DAILY
Status: DISCONTINUED | OUTPATIENT
Start: 2023-10-10 | End: 2023-10-15

## 2023-10-10 RX ADMIN — METRONIDAZOLE 500 MG: 500 INJECTION, SOLUTION INTRAVENOUS at 09:04

## 2023-10-10 RX ADMIN — INSULIN LISPRO 4 UNITS: 100 INJECTION, SOLUTION INTRAVENOUS; SUBCUTANEOUS at 13:23

## 2023-10-10 RX ADMIN — APIXABAN 5 MG: 5 TABLET, FILM COATED ORAL at 20:28

## 2023-10-10 RX ADMIN — INSULIN GLARGINE 40 UNITS: 100 INJECTION, SOLUTION SUBCUTANEOUS at 09:01

## 2023-10-10 RX ADMIN — ASPIRIN 81 MG: 81 TABLET, CHEWABLE ORAL at 08:44

## 2023-10-10 RX ADMIN — INSULIN LISPRO 12 UNITS: 100 INJECTION, SOLUTION INTRAVENOUS; SUBCUTANEOUS at 17:27

## 2023-10-10 RX ADMIN — METOPROLOL TARTRATE 25 MG: 25 TABLET, FILM COATED ORAL at 08:44

## 2023-10-10 RX ADMIN — METRONIDAZOLE 500 MG: 500 INJECTION, SOLUTION INTRAVENOUS at 16:15

## 2023-10-10 RX ADMIN — HYDROMORPHONE HYDROCHLORIDE 2 MG: 2 TABLET ORAL at 17:27

## 2023-10-10 RX ADMIN — IPRATROPIUM BROMIDE 0.5 MG: 0.5 SOLUTION RESPIRATORY (INHALATION) at 23:08

## 2023-10-10 RX ADMIN — ANASTROZOLE 1 MG: 1 TABLET, COATED ORAL at 08:45

## 2023-10-10 RX ADMIN — DAPTOMYCIN 800 MG: 500 INJECTION, POWDER, LYOPHILIZED, FOR SOLUTION INTRAVENOUS at 15:51

## 2023-10-10 RX ADMIN — APIXABAN 5 MG: 5 TABLET, FILM COATED ORAL at 08:45

## 2023-10-10 RX ADMIN — ARFORMOTEROL TARTRATE: 15 SOLUTION RESPIRATORY (INHALATION) at 10:22

## 2023-10-10 RX ADMIN — IPRATROPIUM BROMIDE 0.5 MG: 0.5 SOLUTION RESPIRATORY (INHALATION) at 10:17

## 2023-10-10 RX ADMIN — CEFEPIME 2000 MG: 2 INJECTION, POWDER, FOR SOLUTION INTRAVENOUS at 06:19

## 2023-10-10 RX ADMIN — METHOCARBAMOL TABLETS 750 MG: 750 TABLET, COATED ORAL at 08:44

## 2023-10-10 RX ADMIN — ARFORMOTEROL TARTRATE: 15 SOLUTION RESPIRATORY (INHALATION) at 23:07

## 2023-10-10 RX ADMIN — METRONIDAZOLE 500 MG: 500 INJECTION, SOLUTION INTRAVENOUS at 00:13

## 2023-10-10 RX ADMIN — HYDROMORPHONE HYDROCHLORIDE 2 MG: 2 TABLET ORAL at 21:37

## 2023-10-10 RX ADMIN — SODIUM CHLORIDE, PRESERVATIVE FREE 10 ML: 5 INJECTION INTRAVENOUS at 09:29

## 2023-10-10 RX ADMIN — GUAIFENESIN 1200 MG: 600 TABLET, EXTENDED RELEASE ORAL at 20:29

## 2023-10-10 RX ADMIN — CYCLOBENZAPRINE 5 MG: 10 TABLET, FILM COATED ORAL at 20:29

## 2023-10-10 RX ADMIN — HYDROMORPHONE HYDROCHLORIDE 2 MG: 2 TABLET ORAL at 04:14

## 2023-10-10 RX ADMIN — CEFEPIME 2000 MG: 2 INJECTION, POWDER, FOR SOLUTION INTRAVENOUS at 15:50

## 2023-10-10 RX ADMIN — CEFEPIME 2000 MG: 2 INJECTION, POWDER, FOR SOLUTION INTRAVENOUS at 21:36

## 2023-10-10 RX ADMIN — METOPROLOL TARTRATE 25 MG: 25 TABLET, FILM COATED ORAL at 20:28

## 2023-10-10 RX ADMIN — ACETAMINOPHEN 650 MG: 325 TABLET ORAL at 04:14

## 2023-10-10 RX ADMIN — MICONAZOLE NITRATE: 20 CREAM TOPICAL at 21:42

## 2023-10-10 RX ADMIN — SODIUM CHLORIDE, PRESERVATIVE FREE 10 ML: 5 INJECTION INTRAVENOUS at 22:02

## 2023-10-10 RX ADMIN — GUAIFENESIN 1200 MG: 600 TABLET, EXTENDED RELEASE ORAL at 08:45

## 2023-10-10 RX ADMIN — METHOCARBAMOL TABLETS 750 MG: 750 TABLET, COATED ORAL at 13:22

## 2023-10-10 RX ADMIN — CYCLOBENZAPRINE 5 MG: 10 TABLET, FILM COATED ORAL at 13:06

## 2023-10-10 RX ADMIN — FUROSEMIDE 40 MG: 10 INJECTION, SOLUTION INTRAMUSCULAR; INTRAVENOUS at 08:43

## 2023-10-10 RX ADMIN — PREDNISONE 20 MG: 20 TABLET ORAL at 08:45

## 2023-10-10 RX ADMIN — MICONAZOLE NITRATE: 20 CREAM TOPICAL at 09:06

## 2023-10-10 RX ADMIN — METHOCARBAMOL TABLETS 750 MG: 750 TABLET, COATED ORAL at 00:38

## 2023-10-10 RX ADMIN — HYDROMORPHONE HYDROCHLORIDE 2 MG: 2 TABLET ORAL at 09:00

## 2023-10-10 RX ADMIN — HYDROMORPHONE HYDROCHLORIDE 2 MG: 2 TABLET ORAL at 13:07

## 2023-10-10 ASSESSMENT — PAIN SCALES - GENERAL
PAINLEVEL_OUTOF10: 6
PAINLEVEL_OUTOF10: 3
PAINLEVEL_OUTOF10: 3
PAINLEVEL_OUTOF10: 7
PAINLEVEL_OUTOF10: 9
PAINLEVEL_OUTOF10: 0
PAINLEVEL_OUTOF10: 3
PAINLEVEL_OUTOF10: 9
PAINLEVEL_OUTOF10: 7
PAINLEVEL_OUTOF10: 5

## 2023-10-10 ASSESSMENT — PAIN DESCRIPTION - ORIENTATION
ORIENTATION: RIGHT;ANTERIOR
ORIENTATION: LOWER
ORIENTATION: RIGHT
ORIENTATION: LOWER;ANTERIOR

## 2023-10-10 ASSESSMENT — PAIN SCALES - WONG BAKER
WONGBAKER_NUMERICALRESPONSE: 4
WONGBAKER_NUMERICALRESPONSE: 6
WONGBAKER_NUMERICALRESPONSE: 4

## 2023-10-10 ASSESSMENT — PAIN DESCRIPTION - LOCATION
LOCATION: BACK
LOCATION: LEG;HIP
LOCATION: BACK;ABDOMEN
LOCATION: CHEST;BACK;LEG
LOCATION: LEG;BACK

## 2023-10-10 ASSESSMENT — PAIN DESCRIPTION - DESCRIPTORS
DESCRIPTORS: ACHING

## 2023-10-10 ASSESSMENT — PAIN DESCRIPTION - ONSET: ONSET: ON-GOING

## 2023-10-11 LAB
BASOPHILS # BLD: 0.2 K/UL (ref 0–0.1)
BASOPHILS NFR BLD: 2 % (ref 0–1)
DIFFERENTIAL METHOD BLD: ABNORMAL
EOSINOPHIL # BLD: 0.1 K/UL (ref 0–0.4)
EOSINOPHIL NFR BLD: 1 % (ref 0–7)
ERYTHROCYTE [DISTWIDTH] IN BLOOD BY AUTOMATED COUNT: 13.1 % (ref 11.5–14.5)
GLUCOSE BLD STRIP.AUTO-MCNC: 104 MG/DL (ref 65–117)
GLUCOSE BLD STRIP.AUTO-MCNC: 174 MG/DL (ref 65–117)
GLUCOSE BLD STRIP.AUTO-MCNC: 257 MG/DL (ref 65–117)
GLUCOSE BLD STRIP.AUTO-MCNC: 266 MG/DL (ref 65–117)
HCT VFR BLD AUTO: 35.7 % (ref 35–47)
HGB BLD-MCNC: 11 G/DL (ref 11.5–16)
IMM GRANULOCYTES # BLD AUTO: 0 K/UL
IMM GRANULOCYTES NFR BLD AUTO: 0 %
LYMPHOCYTES # BLD: 1 K/UL (ref 0.8–3.5)
LYMPHOCYTES NFR BLD: 11 % (ref 12–49)
MCH RBC QN AUTO: 29.6 PG (ref 26–34)
MCHC RBC AUTO-ENTMCNC: 30.8 G/DL (ref 30–36.5)
MCV RBC AUTO: 96.2 FL (ref 80–99)
MONOCYTES # BLD: 1.2 K/UL (ref 0–1)
MONOCYTES NFR BLD: 13 % (ref 5–13)
MYELOCYTES NFR BLD MANUAL: 1 %
NEUTS BAND NFR BLD MANUAL: 1 % (ref 0–6)
NEUTS SEG # BLD: 6.8 K/UL (ref 1.8–8)
NEUTS SEG NFR BLD: 71 % (ref 32–75)
NRBC # BLD: 0.04 K/UL (ref 0–0.01)
NRBC BLD-RTO: 0.4 PER 100 WBC
PLATELET # BLD AUTO: 236 K/UL (ref 150–400)
PLATELET COMMENT: ABNORMAL
PMV BLD AUTO: 10.1 FL (ref 8.9–12.9)
RBC # BLD AUTO: 3.71 M/UL (ref 3.8–5.2)
RBC MORPH BLD: ABNORMAL
SERVICE CMNT-IMP: ABNORMAL
SERVICE CMNT-IMP: NORMAL
WBC # BLD AUTO: 9.5 K/UL (ref 3.6–11)

## 2023-10-11 PROCEDURE — 2700000000 HC OXYGEN THERAPY PER DAY

## 2023-10-11 PROCEDURE — 85025 COMPLETE CBC W/AUTO DIFF WBC: CPT

## 2023-10-11 PROCEDURE — 97530 THERAPEUTIC ACTIVITIES: CPT

## 2023-10-11 PROCEDURE — 6370000000 HC RX 637 (ALT 250 FOR IP): Performed by: INTERNAL MEDICINE

## 2023-10-11 PROCEDURE — 6370000000 HC RX 637 (ALT 250 FOR IP): Performed by: NURSE PRACTITIONER

## 2023-10-11 PROCEDURE — 94640 AIRWAY INHALATION TREATMENT: CPT

## 2023-10-11 PROCEDURE — 1100000000 HC RM PRIVATE

## 2023-10-11 PROCEDURE — 6360000002 HC RX W HCPCS: Performed by: INTERNAL MEDICINE

## 2023-10-11 PROCEDURE — 2580000003 HC RX 258: Performed by: INTERNAL MEDICINE

## 2023-10-11 PROCEDURE — 97161 PT EVAL LOW COMPLEX 20 MIN: CPT

## 2023-10-11 PROCEDURE — 94761 N-INVAS EAR/PLS OXIMETRY MLT: CPT

## 2023-10-11 PROCEDURE — 97535 SELF CARE MNGMENT TRAINING: CPT

## 2023-10-11 PROCEDURE — 36415 COLL VENOUS BLD VENIPUNCTURE: CPT

## 2023-10-11 PROCEDURE — 99233 SBSQ HOSP IP/OBS HIGH 50: CPT | Performed by: INTERNAL MEDICINE

## 2023-10-11 PROCEDURE — 82962 GLUCOSE BLOOD TEST: CPT

## 2023-10-11 RX ORDER — HYDROMORPHONE HYDROCHLORIDE 2 MG/1
3 TABLET ORAL EVERY 4 HOURS PRN
Status: DISCONTINUED | OUTPATIENT
Start: 2023-10-11 | End: 2023-10-12

## 2023-10-11 RX ORDER — HYDROMORPHONE HYDROCHLORIDE 1 MG/ML
3 SOLUTION ORAL EVERY 4 HOURS PRN
Status: DISCONTINUED | OUTPATIENT
Start: 2023-10-11 | End: 2023-10-11

## 2023-10-11 RX ADMIN — ACETAMINOPHEN 650 MG: 325 TABLET ORAL at 14:41

## 2023-10-11 RX ADMIN — HYDROMORPHONE HYDROCHLORIDE 2 MG: 2 TABLET ORAL at 05:52

## 2023-10-11 RX ADMIN — GUAIFENESIN 1200 MG: 600 TABLET, EXTENDED RELEASE ORAL at 09:15

## 2023-10-11 RX ADMIN — METRONIDAZOLE 500 MG: 500 INJECTION, SOLUTION INTRAVENOUS at 17:18

## 2023-10-11 RX ADMIN — METOPROLOL TARTRATE 25 MG: 25 TABLET, FILM COATED ORAL at 20:06

## 2023-10-11 RX ADMIN — METRONIDAZOLE 500 MG: 500 INJECTION, SOLUTION INTRAVENOUS at 00:41

## 2023-10-11 RX ADMIN — IPRATROPIUM BROMIDE 0.5 MG: 0.5 SOLUTION RESPIRATORY (INHALATION) at 20:56

## 2023-10-11 RX ADMIN — CYCLOBENZAPRINE 5 MG: 10 TABLET, FILM COATED ORAL at 20:06

## 2023-10-11 RX ADMIN — MICONAZOLE NITRATE: 20 CREAM TOPICAL at 22:14

## 2023-10-11 RX ADMIN — MICONAZOLE NITRATE: 20 CREAM TOPICAL at 09:17

## 2023-10-11 RX ADMIN — GUAIFENESIN 1200 MG: 600 TABLET, EXTENDED RELEASE ORAL at 20:06

## 2023-10-11 RX ADMIN — CYCLOBENZAPRINE 5 MG: 10 TABLET, FILM COATED ORAL at 17:17

## 2023-10-11 RX ADMIN — ARFORMOTEROL TARTRATE: 15 SOLUTION RESPIRATORY (INHALATION) at 20:56

## 2023-10-11 RX ADMIN — CYCLOBENZAPRINE 5 MG: 10 TABLET, FILM COATED ORAL at 12:30

## 2023-10-11 RX ADMIN — ACETAMINOPHEN 650 MG: 325 TABLET ORAL at 00:38

## 2023-10-11 RX ADMIN — DAPTOMYCIN 800 MG: 500 INJECTION, POWDER, LYOPHILIZED, FOR SOLUTION INTRAVENOUS at 14:41

## 2023-10-11 RX ADMIN — INSULIN LISPRO 8 UNITS: 100 INJECTION, SOLUTION INTRAVENOUS; SUBCUTANEOUS at 18:33

## 2023-10-11 RX ADMIN — APIXABAN 5 MG: 5 TABLET, FILM COATED ORAL at 09:15

## 2023-10-11 RX ADMIN — METHOCARBAMOL TABLETS 750 MG: 750 TABLET, COATED ORAL at 12:30

## 2023-10-11 RX ADMIN — ANASTROZOLE 1 MG: 1 TABLET, COATED ORAL at 12:30

## 2023-10-11 RX ADMIN — METHOCARBAMOL TABLETS 750 MG: 750 TABLET, COATED ORAL at 23:50

## 2023-10-11 RX ADMIN — IPRATROPIUM BROMIDE AND ALBUTEROL SULFATE 1 DOSE: .5; 3 SOLUTION RESPIRATORY (INHALATION) at 10:45

## 2023-10-11 RX ADMIN — SODIUM CHLORIDE, PRESERVATIVE FREE 10 ML: 5 INJECTION INTRAVENOUS at 09:16

## 2023-10-11 RX ADMIN — APIXABAN 5 MG: 5 TABLET, FILM COATED ORAL at 20:07

## 2023-10-11 RX ADMIN — CEFEPIME 2000 MG: 2 INJECTION, POWDER, FOR SOLUTION INTRAVENOUS at 05:54

## 2023-10-11 RX ADMIN — INSULIN GLARGINE 40 UNITS: 100 INJECTION, SOLUTION SUBCUTANEOUS at 09:15

## 2023-10-11 RX ADMIN — METHOCARBAMOL TABLETS 750 MG: 750 TABLET, COATED ORAL at 09:14

## 2023-10-11 RX ADMIN — Medication 3 MG: at 20:07

## 2023-10-11 RX ADMIN — PREDNISONE 20 MG: 20 TABLET ORAL at 09:15

## 2023-10-11 RX ADMIN — POLYETHYLENE GLYCOL 3350 17 G: 17 POWDER, FOR SOLUTION ORAL at 09:15

## 2023-10-11 RX ADMIN — METRONIDAZOLE 500 MG: 500 INJECTION, SOLUTION INTRAVENOUS at 09:17

## 2023-10-11 RX ADMIN — HYDROMORPHONE HYDROCHLORIDE 2 MG: 2 TABLET ORAL at 14:40

## 2023-10-11 RX ADMIN — FUROSEMIDE 40 MG: 10 INJECTION, SOLUTION INTRAMUSCULAR; INTRAVENOUS at 09:15

## 2023-10-11 RX ADMIN — ARFORMOTEROL TARTRATE: 15 SOLUTION RESPIRATORY (INHALATION) at 10:51

## 2023-10-11 RX ADMIN — CEFEPIME 2000 MG: 2 INJECTION, POWDER, FOR SOLUTION INTRAVENOUS at 18:32

## 2023-10-11 RX ADMIN — HYDROMORPHONE HYDROCHLORIDE 2 MG: 2 TABLET ORAL at 02:15

## 2023-10-11 RX ADMIN — ASPIRIN 81 MG: 81 TABLET, CHEWABLE ORAL at 09:15

## 2023-10-11 RX ADMIN — SODIUM CHLORIDE, PRESERVATIVE FREE 10 ML: 5 INJECTION INTRAVENOUS at 22:12

## 2023-10-11 RX ADMIN — METOPROLOL TARTRATE 25 MG: 25 TABLET, FILM COATED ORAL at 09:15

## 2023-10-11 RX ADMIN — HYDROMORPHONE HYDROCHLORIDE 2 MG: 2 TABLET ORAL at 09:15

## 2023-10-11 RX ADMIN — METHOCARBAMOL TABLETS 750 MG: 750 TABLET, COATED ORAL at 00:40

## 2023-10-11 ASSESSMENT — PAIN DESCRIPTION - ORIENTATION
ORIENTATION: LOWER

## 2023-10-11 ASSESSMENT — PAIN DESCRIPTION - LOCATION
LOCATION: BACK

## 2023-10-11 ASSESSMENT — PAIN SCALES - WONG BAKER: WONGBAKER_NUMERICALRESPONSE: 2

## 2023-10-11 ASSESSMENT — PAIN SCALES - GENERAL
PAINLEVEL_OUTOF10: 7

## 2023-10-11 ASSESSMENT — PAIN DESCRIPTION - DESCRIPTORS
DESCRIPTORS: ACHING
DESCRIPTORS: ACHING

## 2023-10-12 PROBLEM — R00.0 TACHYCARDIA: Status: ACTIVE | Noted: 2023-10-12

## 2023-10-12 PROBLEM — J96.21 ACUTE AND CHRONIC RESPIRATORY FAILURE WITH HYPOXIA (HCC): Status: ACTIVE | Noted: 2023-10-12

## 2023-10-12 PROBLEM — C50.919 CARCINOMA OF BREAST METASTATIC TO MULTIPLE SITES (HCC): Status: ACTIVE | Noted: 2023-10-12

## 2023-10-12 PROBLEM — G89.4 CHRONIC PAIN SYNDROME: Status: ACTIVE | Noted: 2023-10-12

## 2023-10-12 LAB
GLUCOSE BLD STRIP.AUTO-MCNC: 120 MG/DL (ref 65–117)
GLUCOSE BLD STRIP.AUTO-MCNC: 181 MG/DL (ref 65–117)
GLUCOSE BLD STRIP.AUTO-MCNC: 230 MG/DL (ref 65–117)
GLUCOSE BLD STRIP.AUTO-MCNC: 290 MG/DL (ref 65–117)
GLUCOSE BLD STRIP.AUTO-MCNC: 96 MG/DL (ref 65–117)
SERVICE CMNT-IMP: ABNORMAL
SERVICE CMNT-IMP: NORMAL

## 2023-10-12 PROCEDURE — 2580000003 HC RX 258: Performed by: INTERNAL MEDICINE

## 2023-10-12 PROCEDURE — 6370000000 HC RX 637 (ALT 250 FOR IP): Performed by: NURSE PRACTITIONER

## 2023-10-12 PROCEDURE — 1100000000 HC RM PRIVATE

## 2023-10-12 PROCEDURE — 6360000002 HC RX W HCPCS: Performed by: INTERNAL MEDICINE

## 2023-10-12 PROCEDURE — 82962 GLUCOSE BLOOD TEST: CPT

## 2023-10-12 PROCEDURE — 6370000000 HC RX 637 (ALT 250 FOR IP): Performed by: INTERNAL MEDICINE

## 2023-10-12 PROCEDURE — 99233 SBSQ HOSP IP/OBS HIGH 50: CPT | Performed by: NURSE PRACTITIONER

## 2023-10-12 PROCEDURE — 94761 N-INVAS EAR/PLS OXIMETRY MLT: CPT

## 2023-10-12 PROCEDURE — 94640 AIRWAY INHALATION TREATMENT: CPT

## 2023-10-12 PROCEDURE — 2700000000 HC OXYGEN THERAPY PER DAY

## 2023-10-12 RX ORDER — LANOLIN ALCOHOL/MO/W.PET/CERES
3 CREAM (GRAM) TOPICAL NIGHTLY PRN
Status: DISCONTINUED | OUTPATIENT
Start: 2023-10-12 | End: 2023-10-25 | Stop reason: HOSPADM

## 2023-10-12 RX ORDER — HYDROMORPHONE HYDROCHLORIDE 2 MG/1
2 TABLET ORAL EVERY 4 HOURS PRN
Status: DISCONTINUED | OUTPATIENT
Start: 2023-10-12 | End: 2023-10-14

## 2023-10-12 RX ADMIN — SODIUM CHLORIDE, PRESERVATIVE FREE 10 ML: 5 INJECTION INTRAVENOUS at 09:10

## 2023-10-12 RX ADMIN — IPRATROPIUM BROMIDE AND ALBUTEROL SULFATE 1 DOSE: .5; 3 SOLUTION RESPIRATORY (INHALATION) at 12:35

## 2023-10-12 RX ADMIN — METOPROLOL TARTRATE 25 MG: 25 TABLET, FILM COATED ORAL at 11:14

## 2023-10-12 RX ADMIN — METRONIDAZOLE 500 MG: 500 INJECTION, SOLUTION INTRAVENOUS at 18:24

## 2023-10-12 RX ADMIN — GUAIFENESIN 1200 MG: 600 TABLET, EXTENDED RELEASE ORAL at 09:01

## 2023-10-12 RX ADMIN — CEFEPIME 2000 MG: 2 INJECTION, POWDER, FOR SOLUTION INTRAVENOUS at 21:12

## 2023-10-12 RX ADMIN — ACETAMINOPHEN 650 MG: 325 TABLET ORAL at 02:02

## 2023-10-12 RX ADMIN — METOPROLOL TARTRATE 25 MG: 25 TABLET, FILM COATED ORAL at 21:15

## 2023-10-12 RX ADMIN — METRONIDAZOLE 500 MG: 500 INJECTION, SOLUTION INTRAVENOUS at 00:02

## 2023-10-12 RX ADMIN — METRONIDAZOLE 500 MG: 500 INJECTION, SOLUTION INTRAVENOUS at 09:05

## 2023-10-12 RX ADMIN — ARFORMOTEROL TARTRATE: 15 SOLUTION RESPIRATORY (INHALATION) at 19:43

## 2023-10-12 RX ADMIN — CEFEPIME 2000 MG: 2 INJECTION, POWDER, FOR SOLUTION INTRAVENOUS at 11:11

## 2023-10-12 RX ADMIN — METHOCARBAMOL TABLETS 750 MG: 750 TABLET, COATED ORAL at 18:28

## 2023-10-12 RX ADMIN — HYDROMORPHONE HYDROCHLORIDE 2 MG: 2 TABLET ORAL at 21:13

## 2023-10-12 RX ADMIN — ACETAMINOPHEN 650 MG: 325 TABLET ORAL at 14:09

## 2023-10-12 RX ADMIN — SODIUM CHLORIDE, PRESERVATIVE FREE 10 ML: 5 INJECTION INTRAVENOUS at 23:06

## 2023-10-12 RX ADMIN — PREDNISONE 20 MG: 20 TABLET ORAL at 09:03

## 2023-10-12 RX ADMIN — CEFEPIME 2000 MG: 2 INJECTION, POWDER, FOR SOLUTION INTRAVENOUS at 02:00

## 2023-10-12 RX ADMIN — DAPTOMYCIN 800 MG: 500 INJECTION, POWDER, LYOPHILIZED, FOR SOLUTION INTRAVENOUS at 14:54

## 2023-10-12 RX ADMIN — ARFORMOTEROL TARTRATE: 15 SOLUTION RESPIRATORY (INHALATION) at 13:30

## 2023-10-12 RX ADMIN — INSULIN LISPRO 8 UNITS: 100 INJECTION, SOLUTION INTRAVENOUS; SUBCUTANEOUS at 18:23

## 2023-10-12 RX ADMIN — APIXABAN 5 MG: 5 TABLET, FILM COATED ORAL at 09:00

## 2023-10-12 RX ADMIN — MICONAZOLE NITRATE: 20 CREAM TOPICAL at 13:38

## 2023-10-12 RX ADMIN — CYCLOBENZAPRINE 5 MG: 10 TABLET, FILM COATED ORAL at 13:27

## 2023-10-12 RX ADMIN — METHOCARBAMOL TABLETS 750 MG: 750 TABLET, COATED ORAL at 09:00

## 2023-10-12 RX ADMIN — ASPIRIN 81 MG: 81 TABLET, CHEWABLE ORAL at 09:01

## 2023-10-12 RX ADMIN — HYDROMORPHONE HYDROCHLORIDE 3 MG: 2 TABLET ORAL at 11:10

## 2023-10-12 RX ADMIN — CYCLOBENZAPRINE 5 MG: 10 TABLET, FILM COATED ORAL at 21:14

## 2023-10-12 RX ADMIN — INSULIN GLARGINE 40 UNITS: 100 INJECTION, SOLUTION SUBCUTANEOUS at 09:02

## 2023-10-12 RX ADMIN — APIXABAN 5 MG: 5 TABLET, FILM COATED ORAL at 21:15

## 2023-10-12 RX ADMIN — HYDROMORPHONE HYDROCHLORIDE 3 MG: 2 TABLET ORAL at 00:13

## 2023-10-12 RX ADMIN — MICONAZOLE NITRATE: 20 CREAM TOPICAL at 21:29

## 2023-10-12 RX ADMIN — FUROSEMIDE 40 MG: 10 INJECTION, SOLUTION INTRAMUSCULAR; INTRAVENOUS at 11:17

## 2023-10-12 RX ADMIN — GUAIFENESIN 1200 MG: 600 TABLET, EXTENDED RELEASE ORAL at 21:15

## 2023-10-12 RX ADMIN — HYDROMORPHONE HYDROCHLORIDE 3 MG: 2 TABLET ORAL at 04:29

## 2023-10-12 RX ADMIN — ALBUTEROL SULFATE 2.5 MG: 2.5 SOLUTION RESPIRATORY (INHALATION) at 18:30

## 2023-10-12 RX ADMIN — ANASTROZOLE 1 MG: 1 TABLET, COATED ORAL at 13:26

## 2023-10-12 ASSESSMENT — PAIN SCALES - GENERAL
PAINLEVEL_OUTOF10: 2
PAINLEVEL_OUTOF10: 4
PAINLEVEL_OUTOF10: 0
PAINLEVEL_OUTOF10: 8
PAINLEVEL_OUTOF10: 2
PAINLEVEL_OUTOF10: 7
PAINLEVEL_OUTOF10: 3
PAINLEVEL_OUTOF10: 6
PAINLEVEL_OUTOF10: 3
PAINLEVEL_OUTOF10: 7
PAINLEVEL_OUTOF10: 6

## 2023-10-12 ASSESSMENT — PAIN DESCRIPTION - ORIENTATION
ORIENTATION: RIGHT;LEFT
ORIENTATION: LEFT
ORIENTATION: LEFT
ORIENTATION: RIGHT;LEFT

## 2023-10-12 ASSESSMENT — PAIN DESCRIPTION - DESCRIPTORS
DESCRIPTORS: ACHING

## 2023-10-12 ASSESSMENT — PAIN DESCRIPTION - LOCATION
LOCATION: LEG;CHEST
LOCATION: LEG
LOCATION: BACK;LEG
LOCATION: LEG

## 2023-10-12 ASSESSMENT — ENCOUNTER SYMPTOMS
WHEEZING: 1
SHORTNESS OF BREATH: 0
GASTROINTESTINAL NEGATIVE: 1

## 2023-10-12 ASSESSMENT — PAIN SCALES - WONG BAKER: WONGBAKER_NUMERICALRESPONSE: 4

## 2023-10-13 LAB
ANION GAP SERPL CALC-SCNC: 0 MMOL/L (ref 5–15)
BUN SERPL-MCNC: 18 MG/DL (ref 6–20)
BUN/CREAT SERPL: 24 (ref 12–20)
CALCIUM SERPL-MCNC: 10.1 MG/DL (ref 8.5–10.1)
CHLORIDE SERPL-SCNC: 101 MMOL/L (ref 97–108)
CO2 SERPL-SCNC: 34 MMOL/L (ref 21–32)
CREAT SERPL-MCNC: 0.74 MG/DL (ref 0.55–1.02)
ERYTHROCYTE [DISTWIDTH] IN BLOOD BY AUTOMATED COUNT: 13.2 % (ref 11.5–14.5)
GLUCOSE BLD STRIP.AUTO-MCNC: 126 MG/DL (ref 65–117)
GLUCOSE BLD STRIP.AUTO-MCNC: 131 MG/DL (ref 65–117)
GLUCOSE BLD STRIP.AUTO-MCNC: 227 MG/DL (ref 65–117)
GLUCOSE BLD STRIP.AUTO-MCNC: 269 MG/DL (ref 65–117)
GLUCOSE BLD STRIP.AUTO-MCNC: 82 MG/DL (ref 65–117)
GLUCOSE SERPL-MCNC: 139 MG/DL (ref 65–100)
HCT VFR BLD AUTO: 37.6 % (ref 35–47)
HGB BLD-MCNC: 11.7 G/DL (ref 11.5–16)
MCH RBC QN AUTO: 29.5 PG (ref 26–34)
MCHC RBC AUTO-ENTMCNC: 31.1 G/DL (ref 30–36.5)
MCV RBC AUTO: 94.9 FL (ref 80–99)
NRBC # BLD: 0 K/UL (ref 0–0.01)
NRBC BLD-RTO: 0 PER 100 WBC
PLATELET # BLD AUTO: 246 K/UL (ref 150–400)
PMV BLD AUTO: 10 FL (ref 8.9–12.9)
POTASSIUM SERPL-SCNC: 3.8 MMOL/L (ref 3.5–5.1)
RBC # BLD AUTO: 3.96 M/UL (ref 3.8–5.2)
SERVICE CMNT-IMP: ABNORMAL
SERVICE CMNT-IMP: NORMAL
SODIUM SERPL-SCNC: 135 MMOL/L (ref 136–145)
WBC # BLD AUTO: 9.2 K/UL (ref 3.6–11)

## 2023-10-13 PROCEDURE — 6370000000 HC RX 637 (ALT 250 FOR IP): Performed by: INTERNAL MEDICINE

## 2023-10-13 PROCEDURE — 99232 SBSQ HOSP IP/OBS MODERATE 35: CPT | Performed by: NURSE PRACTITIONER

## 2023-10-13 PROCEDURE — 6360000002 HC RX W HCPCS: Performed by: INTERNAL MEDICINE

## 2023-10-13 PROCEDURE — 80048 BASIC METABOLIC PNL TOTAL CA: CPT

## 2023-10-13 PROCEDURE — 1100000000 HC RM PRIVATE

## 2023-10-13 PROCEDURE — 94761 N-INVAS EAR/PLS OXIMETRY MLT: CPT

## 2023-10-13 PROCEDURE — 2700000000 HC OXYGEN THERAPY PER DAY

## 2023-10-13 PROCEDURE — 36415 COLL VENOUS BLD VENIPUNCTURE: CPT

## 2023-10-13 PROCEDURE — 6370000000 HC RX 637 (ALT 250 FOR IP): Performed by: NURSE PRACTITIONER

## 2023-10-13 PROCEDURE — 85027 COMPLETE CBC AUTOMATED: CPT

## 2023-10-13 PROCEDURE — 94640 AIRWAY INHALATION TREATMENT: CPT

## 2023-10-13 PROCEDURE — 82962 GLUCOSE BLOOD TEST: CPT

## 2023-10-13 PROCEDURE — 2580000003 HC RX 258: Performed by: INTERNAL MEDICINE

## 2023-10-13 RX ADMIN — APIXABAN 5 MG: 5 TABLET, FILM COATED ORAL at 20:28

## 2023-10-13 RX ADMIN — METRONIDAZOLE 500 MG: 500 INJECTION, SOLUTION INTRAVENOUS at 01:29

## 2023-10-13 RX ADMIN — ARFORMOTEROL TARTRATE: 15 SOLUTION RESPIRATORY (INHALATION) at 07:42

## 2023-10-13 RX ADMIN — ACETAMINOPHEN 650 MG: 325 TABLET ORAL at 15:06

## 2023-10-13 RX ADMIN — METRONIDAZOLE 500 MG: 500 INJECTION, SOLUTION INTRAVENOUS at 09:53

## 2023-10-13 RX ADMIN — ARFORMOTEROL TARTRATE: 15 SOLUTION RESPIRATORY (INHALATION) at 19:52

## 2023-10-13 RX ADMIN — CYCLOBENZAPRINE 5 MG: 10 TABLET, FILM COATED ORAL at 20:29

## 2023-10-13 RX ADMIN — DAPTOMYCIN 800 MG: 500 INJECTION, POWDER, LYOPHILIZED, FOR SOLUTION INTRAVENOUS at 16:43

## 2023-10-13 RX ADMIN — ASPIRIN 81 MG: 81 TABLET, CHEWABLE ORAL at 09:37

## 2023-10-13 RX ADMIN — GUAIFENESIN 1200 MG: 600 TABLET, EXTENDED RELEASE ORAL at 09:37

## 2023-10-13 RX ADMIN — METOPROLOL TARTRATE 25 MG: 25 TABLET, FILM COATED ORAL at 09:37

## 2023-10-13 RX ADMIN — ANASTROZOLE 1 MG: 1 TABLET, COATED ORAL at 09:51

## 2023-10-13 RX ADMIN — HYDROMORPHONE HYDROCHLORIDE 2 MG: 2 TABLET ORAL at 15:05

## 2023-10-13 RX ADMIN — HYDROMORPHONE HYDROCHLORIDE 2 MG: 2 TABLET ORAL at 01:00

## 2023-10-13 RX ADMIN — MICONAZOLE NITRATE: 20 CREAM TOPICAL at 09:42

## 2023-10-13 RX ADMIN — IPRATROPIUM BROMIDE AND ALBUTEROL SULFATE 1 DOSE: .5; 3 SOLUTION RESPIRATORY (INHALATION) at 07:37

## 2023-10-13 RX ADMIN — PREDNISONE 20 MG: 20 TABLET ORAL at 09:37

## 2023-10-13 RX ADMIN — METHOCARBAMOL TABLETS 750 MG: 750 TABLET, COATED ORAL at 09:37

## 2023-10-13 RX ADMIN — APIXABAN 5 MG: 5 TABLET, FILM COATED ORAL at 09:37

## 2023-10-13 RX ADMIN — METOPROLOL TARTRATE 25 MG: 25 TABLET, FILM COATED ORAL at 20:30

## 2023-10-13 RX ADMIN — HYDROMORPHONE HYDROCHLORIDE 2 MG: 2 TABLET ORAL at 20:28

## 2023-10-13 RX ADMIN — ACETAMINOPHEN 650 MG: 325 TABLET ORAL at 03:18

## 2023-10-13 RX ADMIN — CYCLOBENZAPRINE 5 MG: 10 TABLET, FILM COATED ORAL at 15:05

## 2023-10-13 RX ADMIN — SODIUM CHLORIDE, PRESERVATIVE FREE 10 ML: 5 INJECTION INTRAVENOUS at 20:39

## 2023-10-13 RX ADMIN — FUROSEMIDE 40 MG: 10 INJECTION, SOLUTION INTRAMUSCULAR; INTRAVENOUS at 09:53

## 2023-10-13 RX ADMIN — HYDROMORPHONE HYDROCHLORIDE 2 MG: 2 TABLET ORAL at 09:51

## 2023-10-13 RX ADMIN — METHOCARBAMOL TABLETS 750 MG: 750 TABLET, COATED ORAL at 01:00

## 2023-10-13 RX ADMIN — INSULIN LISPRO 8 UNITS: 100 INJECTION, SOLUTION INTRAVENOUS; SUBCUTANEOUS at 17:18

## 2023-10-13 RX ADMIN — CYCLOBENZAPRINE 5 MG: 10 TABLET, FILM COATED ORAL at 11:35

## 2023-10-13 RX ADMIN — MICONAZOLE NITRATE: 20 CREAM TOPICAL at 21:00

## 2023-10-13 RX ADMIN — INSULIN GLARGINE 40 UNITS: 100 INJECTION, SOLUTION SUBCUTANEOUS at 09:36

## 2023-10-13 RX ADMIN — ALBUTEROL SULFATE 2.5 MG: 2.5 SOLUTION RESPIRATORY (INHALATION) at 19:46

## 2023-10-13 RX ADMIN — GUAIFENESIN 1200 MG: 600 TABLET, EXTENDED RELEASE ORAL at 20:30

## 2023-10-13 RX ADMIN — METHOCARBAMOL TABLETS 750 MG: 750 TABLET, COATED ORAL at 11:35

## 2023-10-13 RX ADMIN — ALBUTEROL SULFATE 2.5 MG: 2.5 SOLUTION RESPIRATORY (INHALATION) at 11:18

## 2023-10-13 ASSESSMENT — PAIN DESCRIPTION - LOCATION
LOCATION: LEG
LOCATION: HIP
LOCATION: LEG
LOCATION: OTHER (COMMENT)

## 2023-10-13 ASSESSMENT — PAIN DESCRIPTION - ORIENTATION
ORIENTATION: LEFT
ORIENTATION: RIGHT

## 2023-10-13 ASSESSMENT — ENCOUNTER SYMPTOMS
WHEEZING: 1
SHORTNESS OF BREATH: 0
GASTROINTESTINAL NEGATIVE: 1

## 2023-10-13 ASSESSMENT — PAIN DESCRIPTION - DESCRIPTORS
DESCRIPTORS: ACHING
DESCRIPTORS: THROBBING
DESCRIPTORS: ACHING

## 2023-10-13 ASSESSMENT — PAIN SCALES - GENERAL
PAINLEVEL_OUTOF10: 3
PAINLEVEL_OUTOF10: 6
PAINLEVEL_OUTOF10: 5
PAINLEVEL_OUTOF10: 5
PAINLEVEL_OUTOF10: 6
PAINLEVEL_OUTOF10: 6
PAINLEVEL_OUTOF10: 3
PAINLEVEL_OUTOF10: 6

## 2023-10-14 LAB
ANION GAP SERPL CALC-SCNC: 2 MMOL/L (ref 5–15)
BACTERIA SPEC CULT: ABNORMAL
BACTERIA SPEC CULT: ABNORMAL
BACTERIA SPEC CULT: NORMAL
BUN SERPL-MCNC: 16 MG/DL (ref 6–20)
BUN/CREAT SERPL: 21 (ref 12–20)
CALCIUM SERPL-MCNC: 10.2 MG/DL (ref 8.5–10.1)
CHLORIDE SERPL-SCNC: 97 MMOL/L (ref 97–108)
CO2 SERPL-SCNC: 36 MMOL/L (ref 21–32)
CREAT SERPL-MCNC: 0.75 MG/DL (ref 0.55–1.02)
ERYTHROCYTE [DISTWIDTH] IN BLOOD BY AUTOMATED COUNT: 13.1 % (ref 11.5–14.5)
GLUCOSE BLD STRIP.AUTO-MCNC: 107 MG/DL (ref 65–117)
GLUCOSE BLD STRIP.AUTO-MCNC: 176 MG/DL (ref 65–117)
GLUCOSE BLD STRIP.AUTO-MCNC: 187 MG/DL (ref 65–117)
GLUCOSE BLD STRIP.AUTO-MCNC: 210 MG/DL (ref 65–117)
GLUCOSE SERPL-MCNC: 195 MG/DL (ref 65–100)
HCT VFR BLD AUTO: 39.6 % (ref 35–47)
HGB BLD-MCNC: 12.3 G/DL (ref 11.5–16)
MCH RBC QN AUTO: 29.9 PG (ref 26–34)
MCHC RBC AUTO-ENTMCNC: 31.1 G/DL (ref 30–36.5)
MCV RBC AUTO: 96.1 FL (ref 80–99)
NRBC # BLD: 0 K/UL (ref 0–0.01)
NRBC BLD-RTO: 0 PER 100 WBC
PLATELET # BLD AUTO: 258 K/UL (ref 150–400)
PMV BLD AUTO: 9.7 FL (ref 8.9–12.9)
POTASSIUM SERPL-SCNC: 3.9 MMOL/L (ref 3.5–5.1)
RBC # BLD AUTO: 4.12 M/UL (ref 3.8–5.2)
SERVICE CMNT-IMP: ABNORMAL
SERVICE CMNT-IMP: NORMAL
SERVICE CMNT-IMP: NORMAL
SODIUM SERPL-SCNC: 135 MMOL/L (ref 136–145)
WBC # BLD AUTO: 8.8 K/UL (ref 3.6–11)

## 2023-10-14 PROCEDURE — 80048 BASIC METABOLIC PNL TOTAL CA: CPT

## 2023-10-14 PROCEDURE — 6370000000 HC RX 637 (ALT 250 FOR IP): Performed by: NURSE PRACTITIONER

## 2023-10-14 PROCEDURE — 94761 N-INVAS EAR/PLS OXIMETRY MLT: CPT

## 2023-10-14 PROCEDURE — 6360000002 HC RX W HCPCS: Performed by: INTERNAL MEDICINE

## 2023-10-14 PROCEDURE — 94760 N-INVAS EAR/PLS OXIMETRY 1: CPT

## 2023-10-14 PROCEDURE — 36415 COLL VENOUS BLD VENIPUNCTURE: CPT

## 2023-10-14 PROCEDURE — 6370000000 HC RX 637 (ALT 250 FOR IP): Performed by: INTERNAL MEDICINE

## 2023-10-14 PROCEDURE — 85027 COMPLETE CBC AUTOMATED: CPT

## 2023-10-14 PROCEDURE — 2580000003 HC RX 258: Performed by: INTERNAL MEDICINE

## 2023-10-14 PROCEDURE — 87040 BLOOD CULTURE FOR BACTERIA: CPT

## 2023-10-14 PROCEDURE — 1100000000 HC RM PRIVATE

## 2023-10-14 PROCEDURE — C1751 CATH, INF, PER/CENT/MIDLINE: HCPCS

## 2023-10-14 PROCEDURE — 36410 VNPNXR 3YR/> PHY/QHP DX/THER: CPT

## 2023-10-14 PROCEDURE — 2700000000 HC OXYGEN THERAPY PER DAY

## 2023-10-14 PROCEDURE — 05HF33Z INSERTION OF INFUSION DEVICE INTO LEFT CEPHALIC VEIN, PERCUTANEOUS APPROACH: ICD-10-PCS | Performed by: STUDENT IN AN ORGANIZED HEALTH CARE EDUCATION/TRAINING PROGRAM

## 2023-10-14 PROCEDURE — 82962 GLUCOSE BLOOD TEST: CPT

## 2023-10-14 PROCEDURE — 94640 AIRWAY INHALATION TREATMENT: CPT

## 2023-10-14 PROCEDURE — 76937 US GUIDE VASCULAR ACCESS: CPT

## 2023-10-14 RX ORDER — HYDROMORPHONE HYDROCHLORIDE 2 MG/1
2 TABLET ORAL
Status: DISCONTINUED | OUTPATIENT
Start: 2023-10-14 | End: 2023-10-23

## 2023-10-14 RX ORDER — ACETAMINOPHEN 325 MG/1
650 TABLET ORAL EVERY 4 HOURS PRN
Status: DISCONTINUED | OUTPATIENT
Start: 2023-10-14 | End: 2023-10-16 | Stop reason: SDUPTHER

## 2023-10-14 RX ORDER — SENNA AND DOCUSATE SODIUM 50; 8.6 MG/1; MG/1
2 TABLET, FILM COATED ORAL DAILY
Status: DISCONTINUED | OUTPATIENT
Start: 2023-10-14 | End: 2023-10-25 | Stop reason: HOSPADM

## 2023-10-14 RX ADMIN — ACETAMINOPHEN 650 MG: 325 TABLET ORAL at 03:14

## 2023-10-14 RX ADMIN — HYDROMORPHONE HYDROCHLORIDE 2 MG: 2 TABLET ORAL at 08:57

## 2023-10-14 RX ADMIN — MICONAZOLE NITRATE: 20 CREAM TOPICAL at 21:54

## 2023-10-14 RX ADMIN — HYDROMORPHONE HYDROCHLORIDE 2 MG: 2 TABLET ORAL at 21:37

## 2023-10-14 RX ADMIN — ALBUTEROL SULFATE 2.5 MG: 2.5 SOLUTION RESPIRATORY (INHALATION) at 20:34

## 2023-10-14 RX ADMIN — ALBUTEROL SULFATE 2.5 MG: 2.5 SOLUTION RESPIRATORY (INHALATION) at 03:16

## 2023-10-14 RX ADMIN — SODIUM CHLORIDE, PRESERVATIVE FREE 10 ML: 5 INJECTION INTRAVENOUS at 08:58

## 2023-10-14 RX ADMIN — GUAIFENESIN 1200 MG: 600 TABLET, EXTENDED RELEASE ORAL at 08:57

## 2023-10-14 RX ADMIN — METOPROLOL TARTRATE 25 MG: 25 TABLET, FILM COATED ORAL at 21:38

## 2023-10-14 RX ADMIN — DAPTOMYCIN 800 MG: 500 INJECTION, POWDER, LYOPHILIZED, FOR SOLUTION INTRAVENOUS at 14:37

## 2023-10-14 RX ADMIN — APIXABAN 5 MG: 5 TABLET, FILM COATED ORAL at 21:40

## 2023-10-14 RX ADMIN — ARFORMOTEROL TARTRATE: 15 SOLUTION RESPIRATORY (INHALATION) at 08:31

## 2023-10-14 RX ADMIN — METHOCARBAMOL TABLETS 750 MG: 750 TABLET, COATED ORAL at 08:55

## 2023-10-14 RX ADMIN — SODIUM CHLORIDE, PRESERVATIVE FREE 10 ML: 5 INJECTION INTRAVENOUS at 21:40

## 2023-10-14 RX ADMIN — ACETAMINOPHEN 650 MG: 325 TABLET ORAL at 15:46

## 2023-10-14 RX ADMIN — GUAIFENESIN 1200 MG: 600 TABLET, EXTENDED RELEASE ORAL at 21:38

## 2023-10-14 RX ADMIN — CYCLOBENZAPRINE 5 MG: 10 TABLET, FILM COATED ORAL at 13:21

## 2023-10-14 RX ADMIN — PREDNISONE 20 MG: 20 TABLET ORAL at 08:55

## 2023-10-14 RX ADMIN — ARFORMOTEROL TARTRATE: 15 SOLUTION RESPIRATORY (INHALATION) at 20:48

## 2023-10-14 RX ADMIN — APIXABAN 5 MG: 5 TABLET, FILM COATED ORAL at 08:57

## 2023-10-14 RX ADMIN — ASPIRIN 81 MG: 81 TABLET, CHEWABLE ORAL at 08:55

## 2023-10-14 RX ADMIN — CYCLOBENZAPRINE 5 MG: 10 TABLET, FILM COATED ORAL at 15:47

## 2023-10-14 RX ADMIN — INSULIN GLARGINE 40 UNITS: 100 INJECTION, SOLUTION SUBCUTANEOUS at 08:54

## 2023-10-14 RX ADMIN — HYDROMORPHONE HYDROCHLORIDE 2 MG: 2 TABLET ORAL at 00:38

## 2023-10-14 RX ADMIN — IPRATROPIUM BROMIDE AND ALBUTEROL SULFATE 1 DOSE: .5; 3 SOLUTION RESPIRATORY (INHALATION) at 08:23

## 2023-10-14 RX ADMIN — METHOCARBAMOL TABLETS 750 MG: 750 TABLET, COATED ORAL at 00:37

## 2023-10-14 RX ADMIN — CYCLOBENZAPRINE 5 MG: 10 TABLET, FILM COATED ORAL at 21:38

## 2023-10-14 RX ADMIN — ANASTROZOLE 1 MG: 1 TABLET, COATED ORAL at 09:02

## 2023-10-14 RX ADMIN — INSULIN LISPRO 4 UNITS: 100 INJECTION, SOLUTION INTRAVENOUS; SUBCUTANEOUS at 16:44

## 2023-10-14 RX ADMIN — METOPROLOL TARTRATE 25 MG: 25 TABLET, FILM COATED ORAL at 08:56

## 2023-10-14 RX ADMIN — SENNOSIDES AND DOCUSATE SODIUM 2 TABLET: 50; 8.6 TABLET ORAL at 08:55

## 2023-10-14 RX ADMIN — FUROSEMIDE 40 MG: 10 INJECTION, SOLUTION INTRAMUSCULAR; INTRAVENOUS at 08:57

## 2023-10-14 RX ADMIN — MICONAZOLE NITRATE: 20 CREAM TOPICAL at 10:01

## 2023-10-14 RX ADMIN — METHOCARBAMOL TABLETS 750 MG: 750 TABLET, COATED ORAL at 13:22

## 2023-10-14 ASSESSMENT — PAIN DESCRIPTION - DESCRIPTORS
DESCRIPTORS: ACHING
DESCRIPTORS: ACHING
DESCRIPTORS: STABBING;THROBBING
DESCRIPTORS: ACHING
DESCRIPTORS: THROBBING;SHARP

## 2023-10-14 ASSESSMENT — PAIN DESCRIPTION - ONSET: ONSET: ON-GOING

## 2023-10-14 ASSESSMENT — PAIN DESCRIPTION - ORIENTATION
ORIENTATION: RIGHT;LEFT;DISTAL
ORIENTATION: RIGHT;LEFT;DISTAL
ORIENTATION: RIGHT

## 2023-10-14 ASSESSMENT — PAIN SCALES - GENERAL
PAINLEVEL_OUTOF10: 7
PAINLEVEL_OUTOF10: 4
PAINLEVEL_OUTOF10: 6
PAINLEVEL_OUTOF10: 7
PAINLEVEL_OUTOF10: 7
PAINLEVEL_OUTOF10: 6
PAINLEVEL_OUTOF10: 7

## 2023-10-14 ASSESSMENT — PAIN DESCRIPTION - FREQUENCY: FREQUENCY: CONTINUOUS

## 2023-10-14 ASSESSMENT — ENCOUNTER SYMPTOMS
VOMITING: 0
CHEST TIGHTNESS: 1
NAUSEA: 0
SHORTNESS OF BREATH: 1
CHOKING: 0
COUGH: 1

## 2023-10-14 ASSESSMENT — PAIN DESCRIPTION - LOCATION
LOCATION: BACK;HIP;LEG
LOCATION: HIP
LOCATION: BACK;LEG
LOCATION: BREAST;LEG
LOCATION: LEG
LOCATION: HIP;LEG;BACK
LOCATION: HIP
LOCATION: LEG
LOCATION: HIP

## 2023-10-14 ASSESSMENT — PAIN - FUNCTIONAL ASSESSMENT: PAIN_FUNCTIONAL_ASSESSMENT: PREVENTS OR INTERFERES SOME ACTIVE ACTIVITIES AND ADLS

## 2023-10-14 ASSESSMENT — PAIN DESCRIPTION - PAIN TYPE: TYPE: CHRONIC PAIN

## 2023-10-15 LAB
ANION GAP SERPL CALC-SCNC: 1 MMOL/L (ref 5–15)
BASOPHILS # BLD: 0 K/UL (ref 0–0.1)
BASOPHILS NFR BLD: 0 % (ref 0–1)
BUN SERPL-MCNC: 20 MG/DL (ref 6–20)
BUN/CREAT SERPL: 28 (ref 12–20)
CALCIUM SERPL-MCNC: 10.1 MG/DL (ref 8.5–10.1)
CHLORIDE SERPL-SCNC: 101 MMOL/L (ref 97–108)
CO2 SERPL-SCNC: 35 MMOL/L (ref 21–32)
CREAT SERPL-MCNC: 0.71 MG/DL (ref 0.55–1.02)
DIFFERENTIAL METHOD BLD: ABNORMAL
EOSINOPHIL # BLD: 0 K/UL (ref 0–0.4)
EOSINOPHIL NFR BLD: 0 % (ref 0–7)
ERYTHROCYTE [DISTWIDTH] IN BLOOD BY AUTOMATED COUNT: 13 % (ref 11.5–14.5)
GLUCOSE BLD STRIP.AUTO-MCNC: 107 MG/DL (ref 65–117)
GLUCOSE BLD STRIP.AUTO-MCNC: 181 MG/DL (ref 65–117)
GLUCOSE BLD STRIP.AUTO-MCNC: 190 MG/DL (ref 65–117)
GLUCOSE BLD STRIP.AUTO-MCNC: 231 MG/DL (ref 65–117)
GLUCOSE SERPL-MCNC: 165 MG/DL (ref 65–100)
HCT VFR BLD AUTO: 38.6 % (ref 35–47)
HGB BLD-MCNC: 11.8 G/DL (ref 11.5–16)
IMM GRANULOCYTES # BLD AUTO: 0 K/UL (ref 0–0.04)
IMM GRANULOCYTES NFR BLD AUTO: 0 % (ref 0–0.5)
LYMPHOCYTES # BLD: 1.2 K/UL (ref 0.8–3.5)
LYMPHOCYTES NFR BLD: 13 % (ref 12–49)
MCH RBC QN AUTO: 30.1 PG (ref 26–34)
MCHC RBC AUTO-ENTMCNC: 30.6 G/DL (ref 30–36.5)
MCV RBC AUTO: 98.5 FL (ref 80–99)
METAMYELOCYTES NFR BLD MANUAL: 2 %
MONOCYTES # BLD: 0.4 K/UL (ref 0–1)
MONOCYTES NFR BLD: 5 % (ref 5–13)
NEUTS SEG # BLD: 7.1 K/UL (ref 1.8–8)
NEUTS SEG NFR BLD: 80 % (ref 32–75)
NRBC # BLD: 0 K/UL (ref 0–0.01)
NRBC BLD-RTO: 0 PER 100 WBC
PLATELET # BLD AUTO: 215 K/UL (ref 150–400)
PMV BLD AUTO: 9.6 FL (ref 8.9–12.9)
POTASSIUM SERPL-SCNC: 3.8 MMOL/L (ref 3.5–5.1)
RBC # BLD AUTO: 3.92 M/UL (ref 3.8–5.2)
RBC MORPH BLD: ABNORMAL
SERVICE CMNT-IMP: ABNORMAL
SERVICE CMNT-IMP: NORMAL
SODIUM SERPL-SCNC: 137 MMOL/L (ref 136–145)
TROPONIN I SERPL HS-MCNC: 17 NG/L (ref 0–51)
WBC # BLD AUTO: 8.9 K/UL (ref 3.6–11)

## 2023-10-15 PROCEDURE — 6370000000 HC RX 637 (ALT 250 FOR IP): Performed by: NURSE PRACTITIONER

## 2023-10-15 PROCEDURE — 84484 ASSAY OF TROPONIN QUANT: CPT

## 2023-10-15 PROCEDURE — 6370000000 HC RX 637 (ALT 250 FOR IP): Performed by: INTERNAL MEDICINE

## 2023-10-15 PROCEDURE — 85025 COMPLETE CBC W/AUTO DIFF WBC: CPT

## 2023-10-15 PROCEDURE — 6360000002 HC RX W HCPCS: Performed by: INTERNAL MEDICINE

## 2023-10-15 PROCEDURE — 1100000000 HC RM PRIVATE

## 2023-10-15 PROCEDURE — 2580000003 HC RX 258: Performed by: INTERNAL MEDICINE

## 2023-10-15 PROCEDURE — 6360000002 HC RX W HCPCS: Performed by: NURSE PRACTITIONER

## 2023-10-15 PROCEDURE — A4216 STERILE WATER/SALINE, 10 ML: HCPCS | Performed by: NURSE PRACTITIONER

## 2023-10-15 PROCEDURE — 36415 COLL VENOUS BLD VENIPUNCTURE: CPT

## 2023-10-15 PROCEDURE — 2700000000 HC OXYGEN THERAPY PER DAY

## 2023-10-15 PROCEDURE — 80048 BASIC METABOLIC PNL TOTAL CA: CPT

## 2023-10-15 PROCEDURE — 94640 AIRWAY INHALATION TREATMENT: CPT

## 2023-10-15 PROCEDURE — 2580000003 HC RX 258: Performed by: NURSE PRACTITIONER

## 2023-10-15 PROCEDURE — C9113 INJ PANTOPRAZOLE SODIUM, VIA: HCPCS | Performed by: NURSE PRACTITIONER

## 2023-10-15 PROCEDURE — 94761 N-INVAS EAR/PLS OXIMETRY MLT: CPT

## 2023-10-15 PROCEDURE — 82962 GLUCOSE BLOOD TEST: CPT

## 2023-10-15 RX ORDER — ACETYLCYSTEINE 200 MG/ML
600 SOLUTION ORAL; RESPIRATORY (INHALATION)
Status: DISCONTINUED | OUTPATIENT
Start: 2023-10-15 | End: 2023-10-25 | Stop reason: HOSPADM

## 2023-10-15 RX ADMIN — PREDNISONE 20 MG: 20 TABLET ORAL at 08:45

## 2023-10-15 RX ADMIN — ACETYLCYSTEINE 600 MG: 200 SOLUTION ORAL; RESPIRATORY (INHALATION) at 20:27

## 2023-10-15 RX ADMIN — METOPROLOL TARTRATE 25 MG: 25 TABLET, FILM COATED ORAL at 08:42

## 2023-10-15 RX ADMIN — METHOCARBAMOL TABLETS 750 MG: 750 TABLET, COATED ORAL at 08:45

## 2023-10-15 RX ADMIN — METHOCARBAMOL TABLETS 750 MG: 750 TABLET, COATED ORAL at 01:07

## 2023-10-15 RX ADMIN — ANASTROZOLE 1 MG: 1 TABLET, COATED ORAL at 08:48

## 2023-10-15 RX ADMIN — ACETAMINOPHEN 650 MG: 325 TABLET ORAL at 03:02

## 2023-10-15 RX ADMIN — MICONAZOLE NITRATE: 20 CREAM TOPICAL at 08:49

## 2023-10-15 RX ADMIN — SODIUM CHLORIDE, PRESERVATIVE FREE 10 ML: 5 INJECTION INTRAVENOUS at 22:01

## 2023-10-15 RX ADMIN — ARFORMOTEROL TARTRATE: 15 SOLUTION RESPIRATORY (INHALATION) at 20:28

## 2023-10-15 RX ADMIN — ASPIRIN 81 MG: 81 TABLET, CHEWABLE ORAL at 08:45

## 2023-10-15 RX ADMIN — APIXABAN 5 MG: 5 TABLET, FILM COATED ORAL at 08:42

## 2023-10-15 RX ADMIN — DAPTOMYCIN 800 MG: 500 INJECTION, POWDER, LYOPHILIZED, FOR SOLUTION INTRAVENOUS at 13:46

## 2023-10-15 RX ADMIN — APIXABAN 5 MG: 5 TABLET, FILM COATED ORAL at 21:41

## 2023-10-15 RX ADMIN — METHOCARBAMOL TABLETS 750 MG: 750 TABLET, COATED ORAL at 12:25

## 2023-10-15 RX ADMIN — CYCLOBENZAPRINE 5 MG: 10 TABLET, FILM COATED ORAL at 21:41

## 2023-10-15 RX ADMIN — SODIUM CHLORIDE 40 MG: 9 INJECTION INTRAMUSCULAR; INTRAVENOUS; SUBCUTANEOUS at 12:25

## 2023-10-15 RX ADMIN — MICONAZOLE NITRATE: 20 CREAM TOPICAL at 21:43

## 2023-10-15 RX ADMIN — SODIUM CHLORIDE, PRESERVATIVE FREE 10 ML: 5 INJECTION INTRAVENOUS at 08:48

## 2023-10-15 RX ADMIN — CYCLOBENZAPRINE 5 MG: 10 TABLET, FILM COATED ORAL at 12:24

## 2023-10-15 RX ADMIN — GUAIFENESIN 1200 MG: 600 TABLET, EXTENDED RELEASE ORAL at 08:42

## 2023-10-15 RX ADMIN — FUROSEMIDE 40 MG: 10 INJECTION, SOLUTION INTRAMUSCULAR; INTRAVENOUS at 08:45

## 2023-10-15 RX ADMIN — SENNOSIDES AND DOCUSATE SODIUM 2 TABLET: 50; 8.6 TABLET ORAL at 08:45

## 2023-10-15 RX ADMIN — INSULIN GLARGINE 40 UNITS: 100 INJECTION, SOLUTION SUBCUTANEOUS at 08:45

## 2023-10-15 RX ADMIN — IPRATROPIUM BROMIDE AND ALBUTEROL SULFATE 1 DOSE: .5; 3 SOLUTION RESPIRATORY (INHALATION) at 07:32

## 2023-10-15 RX ADMIN — METOPROLOL TARTRATE 25 MG: 25 TABLET, FILM COATED ORAL at 21:42

## 2023-10-15 RX ADMIN — HYDROMORPHONE HYDROCHLORIDE 2 MG: 2 TABLET ORAL at 08:45

## 2023-10-15 RX ADMIN — INSULIN LISPRO 4 UNITS: 100 INJECTION, SOLUTION INTRAVENOUS; SUBCUTANEOUS at 16:36

## 2023-10-15 RX ADMIN — CYCLOBENZAPRINE 5 MG: 10 TABLET, FILM COATED ORAL at 16:33

## 2023-10-15 RX ADMIN — GUAIFENESIN AND DEXTROMETHORPHAN 5 ML: 100; 10 SYRUP ORAL at 12:28

## 2023-10-15 RX ADMIN — HYDROMORPHONE HYDROCHLORIDE 2 MG: 2 TABLET ORAL at 21:42

## 2023-10-15 RX ADMIN — ACETAMINOPHEN 650 MG: 325 TABLET ORAL at 15:26

## 2023-10-15 RX ADMIN — ARFORMOTEROL TARTRATE: 15 SOLUTION RESPIRATORY (INHALATION) at 07:37

## 2023-10-15 RX ADMIN — GUAIFENESIN 1200 MG: 600 TABLET, EXTENDED RELEASE ORAL at 21:42

## 2023-10-15 ASSESSMENT — PAIN DESCRIPTION - ORIENTATION
ORIENTATION: RIGHT;LEFT;DISTAL
ORIENTATION: RIGHT;LEFT;DISTAL
ORIENTATION: RIGHT;LEFT
ORIENTATION: RIGHT;LEFT;DISTAL

## 2023-10-15 ASSESSMENT — PAIN SCALES - WONG BAKER: WONGBAKER_NUMERICALRESPONSE: 4

## 2023-10-15 ASSESSMENT — PAIN DESCRIPTION - FREQUENCY: FREQUENCY: CONTINUOUS

## 2023-10-15 ASSESSMENT — PAIN DESCRIPTION - DESCRIPTORS
DESCRIPTORS: THROBBING;SHARP
DESCRIPTORS: THROBBING;SHARP

## 2023-10-15 ASSESSMENT — PAIN SCALES - GENERAL
PAINLEVEL_OUTOF10: 6
PAINLEVEL_OUTOF10: 8
PAINLEVEL_OUTOF10: 4
PAINLEVEL_OUTOF10: 4

## 2023-10-15 ASSESSMENT — PAIN DESCRIPTION - LOCATION
LOCATION: BACK;HIP;LEG
LOCATION: HIP;LEG;BACK
LOCATION: BACK;LEG;HIP
LOCATION: BACK;LEG;HIP

## 2023-10-15 ASSESSMENT — PAIN DESCRIPTION - ONSET: ONSET: ON-GOING

## 2023-10-16 ENCOUNTER — APPOINTMENT (OUTPATIENT)
Facility: HOSPITAL | Age: 53
DRG: 189 | End: 2023-10-16
Payer: MEDICARE

## 2023-10-16 LAB
ANION GAP SERPL CALC-SCNC: 1 MMOL/L (ref 5–15)
BASOPHILS # BLD: 0.1 K/UL (ref 0–0.1)
BASOPHILS NFR BLD: 1 % (ref 0–1)
BUN SERPL-MCNC: 20 MG/DL (ref 6–20)
BUN/CREAT SERPL: 28 (ref 12–20)
CALCIUM SERPL-MCNC: 10.1 MG/DL (ref 8.5–10.1)
CHLORIDE SERPL-SCNC: 101 MMOL/L (ref 97–108)
CO2 SERPL-SCNC: 34 MMOL/L (ref 21–32)
CREAT SERPL-MCNC: 0.71 MG/DL (ref 0.55–1.02)
DIFFERENTIAL METHOD BLD: ABNORMAL
ECHO BSA: 2.84 M2
EOSINOPHIL # BLD: 0.1 K/UL (ref 0–0.4)
EOSINOPHIL NFR BLD: 1 % (ref 0–7)
ERYTHROCYTE [DISTWIDTH] IN BLOOD BY AUTOMATED COUNT: 13.2 % (ref 11.5–14.5)
GLUCOSE BLD STRIP.AUTO-MCNC: 106 MG/DL (ref 65–117)
GLUCOSE BLD STRIP.AUTO-MCNC: 138 MG/DL (ref 65–117)
GLUCOSE BLD STRIP.AUTO-MCNC: 187 MG/DL (ref 65–117)
GLUCOSE BLD STRIP.AUTO-MCNC: 231 MG/DL (ref 65–117)
GLUCOSE SERPL-MCNC: 161 MG/DL (ref 65–100)
HCT VFR BLD AUTO: 38 % (ref 35–47)
HGB BLD-MCNC: 11.4 G/DL (ref 11.5–16)
IMM GRANULOCYTES # BLD AUTO: 0.1 K/UL (ref 0–0.04)
IMM GRANULOCYTES NFR BLD AUTO: 1 % (ref 0–0.5)
LYMPHOCYTES # BLD: 0.8 K/UL (ref 0.8–3.5)
LYMPHOCYTES NFR BLD: 6 % (ref 12–49)
MCH RBC QN AUTO: 29.3 PG (ref 26–34)
MCHC RBC AUTO-ENTMCNC: 30 G/DL (ref 30–36.5)
MCV RBC AUTO: 97.7 FL (ref 80–99)
MONOCYTES # BLD: 0.9 K/UL (ref 0–1)
MONOCYTES NFR BLD: 7 % (ref 5–13)
NEUTS SEG # BLD: 10.6 K/UL (ref 1.8–8)
NEUTS SEG NFR BLD: 84 % (ref 32–75)
NRBC # BLD: 0 K/UL (ref 0–0.01)
NRBC BLD-RTO: 0 PER 100 WBC
PLATELET # BLD AUTO: 217 K/UL (ref 150–400)
PMV BLD AUTO: 9.6 FL (ref 8.9–12.9)
POTASSIUM SERPL-SCNC: 4 MMOL/L (ref 3.5–5.1)
RBC # BLD AUTO: 3.89 M/UL (ref 3.8–5.2)
RBC MORPH BLD: ABNORMAL
SERVICE CMNT-IMP: ABNORMAL
SERVICE CMNT-IMP: NORMAL
SODIUM SERPL-SCNC: 136 MMOL/L (ref 136–145)
WBC # BLD AUTO: 12.6 K/UL (ref 3.6–11)

## 2023-10-16 PROCEDURE — 2580000003 HC RX 258: Performed by: INTERNAL MEDICINE

## 2023-10-16 PROCEDURE — 6360000002 HC RX W HCPCS: Performed by: NURSE PRACTITIONER

## 2023-10-16 PROCEDURE — 97530 THERAPEUTIC ACTIVITIES: CPT

## 2023-10-16 PROCEDURE — 6360000002 HC RX W HCPCS: Performed by: INTERNAL MEDICINE

## 2023-10-16 PROCEDURE — 36415 COLL VENOUS BLD VENIPUNCTURE: CPT

## 2023-10-16 PROCEDURE — 6370000000 HC RX 637 (ALT 250 FOR IP): Performed by: INTERNAL MEDICINE

## 2023-10-16 PROCEDURE — 94640 AIRWAY INHALATION TREATMENT: CPT

## 2023-10-16 PROCEDURE — A4216 STERILE WATER/SALINE, 10 ML: HCPCS | Performed by: NURSE PRACTITIONER

## 2023-10-16 PROCEDURE — 6370000000 HC RX 637 (ALT 250 FOR IP): Performed by: NURSE PRACTITIONER

## 2023-10-16 PROCEDURE — 6360000004 HC RX CONTRAST MEDICATION: Performed by: NURSE PRACTITIONER

## 2023-10-16 PROCEDURE — 94761 N-INVAS EAR/PLS OXIMETRY MLT: CPT

## 2023-10-16 PROCEDURE — 2580000003 HC RX 258: Performed by: NURSE PRACTITIONER

## 2023-10-16 PROCEDURE — 71275 CT ANGIOGRAPHY CHEST: CPT

## 2023-10-16 PROCEDURE — 80048 BASIC METABOLIC PNL TOTAL CA: CPT

## 2023-10-16 PROCEDURE — C9113 INJ PANTOPRAZOLE SODIUM, VIA: HCPCS | Performed by: NURSE PRACTITIONER

## 2023-10-16 PROCEDURE — 82962 GLUCOSE BLOOD TEST: CPT

## 2023-10-16 PROCEDURE — 2700000000 HC OXYGEN THERAPY PER DAY

## 2023-10-16 PROCEDURE — 93970 EXTREMITY STUDY: CPT

## 2023-10-16 PROCEDURE — 94669 MECHANICAL CHEST WALL OSCILL: CPT

## 2023-10-16 PROCEDURE — 94760 N-INVAS EAR/PLS OXIMETRY 1: CPT

## 2023-10-16 PROCEDURE — 1100000000 HC RM PRIVATE

## 2023-10-16 PROCEDURE — 85025 COMPLETE CBC W/AUTO DIFF WBC: CPT

## 2023-10-16 RX ORDER — POLYETHYLENE GLYCOL 3350 17 G/17G
17 POWDER, FOR SOLUTION ORAL 2 TIMES DAILY
Status: DISCONTINUED | OUTPATIENT
Start: 2023-10-16 | End: 2023-10-22

## 2023-10-16 RX ORDER — PREDNISONE 20 MG/1
40 TABLET ORAL DAILY
Status: DISCONTINUED | OUTPATIENT
Start: 2023-10-17 | End: 2023-10-21

## 2023-10-16 RX ORDER — LACTULOSE 10 G/15ML
20 SOLUTION ORAL ONCE
Status: COMPLETED | OUTPATIENT
Start: 2023-10-16 | End: 2023-10-16

## 2023-10-16 RX ORDER — AMMONIUM LACTATE 12 G/100G
LOTION TOPICAL DAILY
Status: DISCONTINUED | OUTPATIENT
Start: 2023-10-16 | End: 2023-10-25 | Stop reason: HOSPADM

## 2023-10-16 RX ADMIN — APIXABAN 5 MG: 5 TABLET, FILM COATED ORAL at 08:54

## 2023-10-16 RX ADMIN — SODIUM CHLORIDE, PRESERVATIVE FREE 10 ML: 5 INJECTION INTRAVENOUS at 09:02

## 2023-10-16 RX ADMIN — POLYETHYLENE GLYCOL 3350 17 G: 17 POWDER, FOR SOLUTION ORAL at 21:14

## 2023-10-16 RX ADMIN — GUAIFENESIN AND DEXTROMETHORPHAN 5 ML: 100; 10 SYRUP ORAL at 21:36

## 2023-10-16 RX ADMIN — METHOCARBAMOL TABLETS 750 MG: 750 TABLET, COATED ORAL at 00:48

## 2023-10-16 RX ADMIN — CYCLOBENZAPRINE 5 MG: 10 TABLET, FILM COATED ORAL at 21:14

## 2023-10-16 RX ADMIN — ARFORMOTEROL TARTRATE: 15 SOLUTION RESPIRATORY (INHALATION) at 21:03

## 2023-10-16 RX ADMIN — SODIUM CHLORIDE 40 MG: 9 INJECTION INTRAMUSCULAR; INTRAVENOUS; SUBCUTANEOUS at 08:56

## 2023-10-16 RX ADMIN — ASPIRIN 81 MG: 81 TABLET, CHEWABLE ORAL at 08:54

## 2023-10-16 RX ADMIN — ARFORMOTEROL TARTRATE: 15 SOLUTION RESPIRATORY (INHALATION) at 07:47

## 2023-10-16 RX ADMIN — MICONAZOLE NITRATE: 20 CREAM TOPICAL at 21:26

## 2023-10-16 RX ADMIN — ACETAMINOPHEN 650 MG: 325 TABLET ORAL at 03:56

## 2023-10-16 RX ADMIN — ACETYLCYSTEINE 600 MG: 200 SOLUTION ORAL; RESPIRATORY (INHALATION) at 21:03

## 2023-10-16 RX ADMIN — SODIUM CHLORIDE, PRESERVATIVE FREE 10 ML: 5 INJECTION INTRAVENOUS at 21:26

## 2023-10-16 RX ADMIN — Medication: at 18:30

## 2023-10-16 RX ADMIN — GUAIFENESIN 1200 MG: 600 TABLET, EXTENDED RELEASE ORAL at 08:54

## 2023-10-16 RX ADMIN — METOPROLOL TARTRATE 25 MG: 25 TABLET, FILM COATED ORAL at 21:14

## 2023-10-16 RX ADMIN — GUAIFENESIN 1200 MG: 600 TABLET, EXTENDED RELEASE ORAL at 21:14

## 2023-10-16 RX ADMIN — MICONAZOLE NITRATE: 20 CREAM TOPICAL at 09:06

## 2023-10-16 RX ADMIN — FUROSEMIDE 40 MG: 10 INJECTION, SOLUTION INTRAMUSCULAR; INTRAVENOUS at 08:54

## 2023-10-16 RX ADMIN — METOPROLOL TARTRATE 25 MG: 25 TABLET, FILM COATED ORAL at 08:54

## 2023-10-16 RX ADMIN — DAPTOMYCIN 800 MG: 500 INJECTION, POWDER, LYOPHILIZED, FOR SOLUTION INTRAVENOUS at 14:04

## 2023-10-16 RX ADMIN — APIXABAN 5 MG: 5 TABLET, FILM COATED ORAL at 21:14

## 2023-10-16 RX ADMIN — CYCLOBENZAPRINE 5 MG: 10 TABLET, FILM COATED ORAL at 16:26

## 2023-10-16 RX ADMIN — PREDNISONE 20 MG: 20 TABLET ORAL at 08:54

## 2023-10-16 RX ADMIN — ANASTROZOLE 1 MG: 1 TABLET, COATED ORAL at 09:08

## 2023-10-16 RX ADMIN — INSULIN GLARGINE 40 UNITS: 100 INJECTION, SOLUTION SUBCUTANEOUS at 08:56

## 2023-10-16 RX ADMIN — INSULIN LISPRO 4 UNITS: 100 INJECTION, SOLUTION INTRAVENOUS; SUBCUTANEOUS at 17:30

## 2023-10-16 RX ADMIN — ACETAMINOPHEN 650 MG: 325 TABLET ORAL at 16:26

## 2023-10-16 RX ADMIN — METHOCARBAMOL TABLETS 750 MG: 750 TABLET, COATED ORAL at 12:27

## 2023-10-16 RX ADMIN — HYDROMORPHONE HYDROCHLORIDE 2 MG: 2 TABLET ORAL at 05:55

## 2023-10-16 RX ADMIN — IOPAMIDOL 100 ML: 755 INJECTION, SOLUTION INTRAVENOUS at 11:13

## 2023-10-16 RX ADMIN — METHOCARBAMOL TABLETS 750 MG: 750 TABLET, COATED ORAL at 08:54

## 2023-10-16 RX ADMIN — LACTULOSE 20 G: 20 SOLUTION ORAL at 08:54

## 2023-10-16 RX ADMIN — CYCLOBENZAPRINE 5 MG: 10 TABLET, FILM COATED ORAL at 12:27

## 2023-10-16 ASSESSMENT — PAIN DESCRIPTION - LOCATION
LOCATION: HIP;LEG;BACK
LOCATION: LEG
LOCATION: KNEE;LEG;HIP
LOCATION: LEG
LOCATION: HIP;LEG

## 2023-10-16 ASSESSMENT — PAIN SCALES - GENERAL
PAINLEVEL_OUTOF10: 6
PAINLEVEL_OUTOF10: 7
PAINLEVEL_OUTOF10: 6
PAINLEVEL_OUTOF10: 6
PAINLEVEL_OUTOF10: 5
PAINLEVEL_OUTOF10: 0

## 2023-10-16 ASSESSMENT — PAIN DESCRIPTION - ORIENTATION
ORIENTATION: RIGHT;LEFT
ORIENTATION: RIGHT

## 2023-10-16 ASSESSMENT — PAIN DESCRIPTION - DESCRIPTORS
DESCRIPTORS: THROBBING;SHARP
DESCRIPTORS: THROBBING;SHARP

## 2023-10-16 NOTE — WOUND CARE
Wound care nurse consult requesting lotion for BLE dry scaly skin. Recommend Lac-hydrin lotion daily for BLE dry scaly skin.     [address], RN, CWON

## 2023-10-17 LAB
ANION GAP SERPL CALC-SCNC: 0 MMOL/L (ref 5–15)
BASOPHILS # BLD: 0 K/UL (ref 0–0.1)
BASOPHILS NFR BLD: 0 % (ref 0–1)
BUN SERPL-MCNC: 19 MG/DL (ref 6–20)
BUN/CREAT SERPL: 31 (ref 12–20)
CALCIUM SERPL-MCNC: 10 MG/DL (ref 8.5–10.1)
CHLORIDE SERPL-SCNC: 103 MMOL/L (ref 97–108)
CK SERPL-CCNC: 14 U/L (ref 26–192)
CO2 SERPL-SCNC: 33 MMOL/L (ref 21–32)
CREAT SERPL-MCNC: 0.62 MG/DL (ref 0.55–1.02)
DIFFERENTIAL METHOD BLD: ABNORMAL
EOSINOPHIL # BLD: 0.1 K/UL (ref 0–0.4)
EOSINOPHIL NFR BLD: 1 % (ref 0–7)
ERYTHROCYTE [DISTWIDTH] IN BLOOD BY AUTOMATED COUNT: 13.2 % (ref 11.5–14.5)
GLUCOSE BLD STRIP.AUTO-MCNC: 136 MG/DL (ref 65–117)
GLUCOSE BLD STRIP.AUTO-MCNC: 161 MG/DL (ref 65–117)
GLUCOSE BLD STRIP.AUTO-MCNC: 233 MG/DL (ref 65–117)
GLUCOSE BLD STRIP.AUTO-MCNC: 284 MG/DL (ref 65–117)
GLUCOSE SERPL-MCNC: 177 MG/DL (ref 65–100)
HCT VFR BLD AUTO: 37.8 % (ref 35–47)
HGB BLD-MCNC: 11.7 G/DL (ref 11.5–16)
IMM GRANULOCYTES # BLD AUTO: 0.1 K/UL (ref 0–0.04)
IMM GRANULOCYTES NFR BLD AUTO: 1 % (ref 0–0.5)
LYMPHOCYTES # BLD: 0.6 K/UL (ref 0.8–3.5)
LYMPHOCYTES NFR BLD: 5 % (ref 12–49)
MCH RBC QN AUTO: 30.3 PG (ref 26–34)
MCHC RBC AUTO-ENTMCNC: 31 G/DL (ref 30–36.5)
MCV RBC AUTO: 97.9 FL (ref 80–99)
MONOCYTES # BLD: 0.8 K/UL (ref 0–1)
MONOCYTES NFR BLD: 6 % (ref 5–13)
NEUTS SEG # BLD: 11.3 K/UL (ref 1.8–8)
NEUTS SEG NFR BLD: 87 % (ref 32–75)
NRBC # BLD: 0 K/UL (ref 0–0.01)
NRBC BLD-RTO: 0 PER 100 WBC
PLATELET # BLD AUTO: 208 K/UL (ref 150–400)
PMV BLD AUTO: 9.8 FL (ref 8.9–12.9)
POTASSIUM SERPL-SCNC: 4.3 MMOL/L (ref 3.5–5.1)
RBC # BLD AUTO: 3.86 M/UL (ref 3.8–5.2)
RBC MORPH BLD: ABNORMAL
SERVICE CMNT-IMP: ABNORMAL
SODIUM SERPL-SCNC: 136 MMOL/L (ref 136–145)
WBC # BLD AUTO: 12.9 K/UL (ref 3.6–11)

## 2023-10-17 PROCEDURE — 94640 AIRWAY INHALATION TREATMENT: CPT

## 2023-10-17 PROCEDURE — 87070 CULTURE OTHR SPECIMN AEROBIC: CPT

## 2023-10-17 PROCEDURE — 36415 COLL VENOUS BLD VENIPUNCTURE: CPT

## 2023-10-17 PROCEDURE — 6360000002 HC RX W HCPCS: Performed by: INTERNAL MEDICINE

## 2023-10-17 PROCEDURE — 94669 MECHANICAL CHEST WALL OSCILL: CPT

## 2023-10-17 PROCEDURE — 85025 COMPLETE CBC W/AUTO DIFF WBC: CPT

## 2023-10-17 PROCEDURE — 2580000003 HC RX 258: Performed by: INTERNAL MEDICINE

## 2023-10-17 PROCEDURE — 87205 SMEAR GRAM STAIN: CPT

## 2023-10-17 PROCEDURE — 94760 N-INVAS EAR/PLS OXIMETRY 1: CPT

## 2023-10-17 PROCEDURE — 87899 AGENT NOS ASSAY W/OPTIC: CPT

## 2023-10-17 PROCEDURE — 80048 BASIC METABOLIC PNL TOTAL CA: CPT

## 2023-10-17 PROCEDURE — 6360000002 HC RX W HCPCS: Performed by: NURSE PRACTITIONER

## 2023-10-17 PROCEDURE — 6370000000 HC RX 637 (ALT 250 FOR IP): Performed by: INTERNAL MEDICINE

## 2023-10-17 PROCEDURE — 94761 N-INVAS EAR/PLS OXIMETRY MLT: CPT

## 2023-10-17 PROCEDURE — 2580000003 HC RX 258: Performed by: NURSE PRACTITIONER

## 2023-10-17 PROCEDURE — C9113 INJ PANTOPRAZOLE SODIUM, VIA: HCPCS | Performed by: NURSE PRACTITIONER

## 2023-10-17 PROCEDURE — 82550 ASSAY OF CK (CPK): CPT

## 2023-10-17 PROCEDURE — 99223 1ST HOSP IP/OBS HIGH 75: CPT | Performed by: INTERNAL MEDICINE

## 2023-10-17 PROCEDURE — 87449 NOS EACH ORGANISM AG IA: CPT

## 2023-10-17 PROCEDURE — 99233 SBSQ HOSP IP/OBS HIGH 50: CPT | Performed by: INTERNAL MEDICINE

## 2023-10-17 PROCEDURE — 92610 EVALUATE SWALLOWING FUNCTION: CPT

## 2023-10-17 PROCEDURE — 92526 ORAL FUNCTION THERAPY: CPT

## 2023-10-17 PROCEDURE — 6370000000 HC RX 637 (ALT 250 FOR IP): Performed by: NURSE PRACTITIONER

## 2023-10-17 PROCEDURE — 2700000000 HC OXYGEN THERAPY PER DAY

## 2023-10-17 PROCEDURE — 82962 GLUCOSE BLOOD TEST: CPT

## 2023-10-17 PROCEDURE — 1100000000 HC RM PRIVATE

## 2023-10-17 PROCEDURE — A4216 STERILE WATER/SALINE, 10 ML: HCPCS | Performed by: NURSE PRACTITIONER

## 2023-10-17 RX ORDER — IPRATROPIUM BROMIDE AND ALBUTEROL SULFATE 2.5; .5 MG/3ML; MG/3ML
1 SOLUTION RESPIRATORY (INHALATION)
Status: DISCONTINUED | OUTPATIENT
Start: 2023-10-17 | End: 2023-10-25 | Stop reason: HOSPADM

## 2023-10-17 RX ORDER — LACTULOSE 10 G/15ML
20 SOLUTION ORAL 2 TIMES DAILY
Status: DISCONTINUED | OUTPATIENT
Start: 2023-10-17 | End: 2023-10-22

## 2023-10-17 RX ORDER — BISACODYL 10 MG
10 SUPPOSITORY, RECTAL RECTAL DAILY
Status: DISCONTINUED | OUTPATIENT
Start: 2023-10-17 | End: 2023-10-25 | Stop reason: HOSPADM

## 2023-10-17 RX ORDER — BUDESONIDE 0.5 MG/2ML
0.5 INHALANT ORAL
Status: DISCONTINUED | OUTPATIENT
Start: 2023-10-17 | End: 2023-10-25 | Stop reason: HOSPADM

## 2023-10-17 RX ADMIN — ANASTROZOLE 1 MG: 1 TABLET, COATED ORAL at 08:50

## 2023-10-17 RX ADMIN — METHOCARBAMOL TABLETS 750 MG: 750 TABLET, COATED ORAL at 00:27

## 2023-10-17 RX ADMIN — CYCLOBENZAPRINE 5 MG: 10 TABLET, FILM COATED ORAL at 20:48

## 2023-10-17 RX ADMIN — IPRATROPIUM BROMIDE AND ALBUTEROL SULFATE 1 DOSE: 2.5; .5 SOLUTION RESPIRATORY (INHALATION) at 21:18

## 2023-10-17 RX ADMIN — HYDROMORPHONE HYDROCHLORIDE 2 MG: 2 TABLET ORAL at 05:10

## 2023-10-17 RX ADMIN — BUDESONIDE 500 MCG: 0.5 INHALANT RESPIRATORY (INHALATION) at 21:19

## 2023-10-17 RX ADMIN — MICONAZOLE NITRATE: 20 CREAM TOPICAL at 23:07

## 2023-10-17 RX ADMIN — POLYETHYLENE GLYCOL 3350 17 G: 17 POWDER, FOR SOLUTION ORAL at 20:53

## 2023-10-17 RX ADMIN — ARFORMOTEROL TARTRATE: 15 SOLUTION RESPIRATORY (INHALATION) at 08:18

## 2023-10-17 RX ADMIN — GUAIFENESIN 1200 MG: 600 TABLET, EXTENDED RELEASE ORAL at 08:50

## 2023-10-17 RX ADMIN — ACETAMINOPHEN 650 MG: 325 TABLET ORAL at 03:41

## 2023-10-17 RX ADMIN — CYCLOBENZAPRINE 5 MG: 10 TABLET, FILM COATED ORAL at 17:15

## 2023-10-17 RX ADMIN — PIPERACILLIN AND TAZOBACTAM 4500 MG: 4; .5 INJECTION, POWDER, LYOPHILIZED, FOR SOLUTION INTRAVENOUS at 14:13

## 2023-10-17 RX ADMIN — GUAIFENESIN AND DEXTROMETHORPHAN 5 ML: 100; 10 SYRUP ORAL at 08:49

## 2023-10-17 RX ADMIN — SODIUM CHLORIDE, PRESERVATIVE FREE 10 ML: 5 INJECTION INTRAVENOUS at 21:19

## 2023-10-17 RX ADMIN — HYDROMORPHONE HYDROCHLORIDE 2 MG: 2 TABLET ORAL at 21:16

## 2023-10-17 RX ADMIN — ACETYLCYSTEINE 600 MG: 200 SOLUTION ORAL; RESPIRATORY (INHALATION) at 21:18

## 2023-10-17 RX ADMIN — SENNOSIDES AND DOCUSATE SODIUM 2 TABLET: 50; 8.6 TABLET ORAL at 08:50

## 2023-10-17 RX ADMIN — ALBUTEROL SULFATE 2.5 MG: 2.5 SOLUTION RESPIRATORY (INHALATION) at 13:47

## 2023-10-17 RX ADMIN — CYCLOBENZAPRINE 5 MG: 10 TABLET, FILM COATED ORAL at 13:46

## 2023-10-17 RX ADMIN — INSULIN LISPRO 8 UNITS: 100 INJECTION, SOLUTION INTRAVENOUS; SUBCUTANEOUS at 17:15

## 2023-10-17 RX ADMIN — HYDROMORPHONE HYDROCHLORIDE 2 MG: 2 TABLET ORAL at 00:38

## 2023-10-17 RX ADMIN — GUAIFENESIN AND DEXTROMETHORPHAN 5 ML: 100; 10 SYRUP ORAL at 05:10

## 2023-10-17 RX ADMIN — METOPROLOL TARTRATE 25 MG: 25 TABLET, FILM COATED ORAL at 08:50

## 2023-10-17 RX ADMIN — Medication: at 08:59

## 2023-10-17 RX ADMIN — DAPTOMYCIN 800 MG: 500 INJECTION, POWDER, LYOPHILIZED, FOR SOLUTION INTRAVENOUS at 14:13

## 2023-10-17 RX ADMIN — ATORVASTATIN CALCIUM 20 MG: 20 TABLET, FILM COATED ORAL at 20:50

## 2023-10-17 RX ADMIN — ASPIRIN 81 MG: 81 TABLET, CHEWABLE ORAL at 08:50

## 2023-10-17 RX ADMIN — APIXABAN 5 MG: 5 TABLET, FILM COATED ORAL at 08:50

## 2023-10-17 RX ADMIN — MICONAZOLE NITRATE: 20 CREAM TOPICAL at 09:07

## 2023-10-17 RX ADMIN — METHOCARBAMOL TABLETS 750 MG: 750 TABLET, COATED ORAL at 13:46

## 2023-10-17 RX ADMIN — ACETYLCYSTEINE 600 MG: 200 SOLUTION ORAL; RESPIRATORY (INHALATION) at 08:13

## 2023-10-17 RX ADMIN — POLYETHYLENE GLYCOL 3350 8.5 G: 17 POWDER, FOR SOLUTION ORAL at 08:51

## 2023-10-17 RX ADMIN — ACETAMINOPHEN 650 MG: 325 TABLET ORAL at 17:16

## 2023-10-17 RX ADMIN — GUAIFENESIN 1200 MG: 600 TABLET, EXTENDED RELEASE ORAL at 20:48

## 2023-10-17 RX ADMIN — INSULIN GLARGINE 40 UNITS: 100 INJECTION, SOLUTION SUBCUTANEOUS at 08:51

## 2023-10-17 RX ADMIN — METOPROLOL TARTRATE 25 MG: 25 TABLET, FILM COATED ORAL at 20:50

## 2023-10-17 RX ADMIN — ALBUTEROL SULFATE 2.5 MG: 2.5 SOLUTION RESPIRATORY (INHALATION) at 08:13

## 2023-10-17 RX ADMIN — IPRATROPIUM BROMIDE AND ALBUTEROL SULFATE 1 DOSE: 2.5; .5 SOLUTION RESPIRATORY (INHALATION) at 16:39

## 2023-10-17 RX ADMIN — APIXABAN 5 MG: 5 TABLET, FILM COATED ORAL at 20:50

## 2023-10-17 RX ADMIN — GUAIFENESIN AND DEXTROMETHORPHAN 5 ML: 100; 10 SYRUP ORAL at 23:03

## 2023-10-17 RX ADMIN — SODIUM CHLORIDE 40 MG: 9 INJECTION INTRAMUSCULAR; INTRAVENOUS; SUBCUTANEOUS at 08:50

## 2023-10-17 RX ADMIN — PIPERACILLIN AND TAZOBACTAM 3375 MG: 3; .375 INJECTION, POWDER, LYOPHILIZED, FOR SOLUTION INTRAVENOUS at 20:59

## 2023-10-17 RX ADMIN — SODIUM CHLORIDE, PRESERVATIVE FREE 10 ML: 5 INJECTION INTRAVENOUS at 08:53

## 2023-10-17 RX ADMIN — HYDROMORPHONE HYDROCHLORIDE 2 MG: 2 TABLET ORAL at 08:49

## 2023-10-17 RX ADMIN — METHOCARBAMOL TABLETS 750 MG: 750 TABLET, COATED ORAL at 08:49

## 2023-10-17 RX ADMIN — FUROSEMIDE 40 MG: 10 INJECTION, SOLUTION INTRAMUSCULAR; INTRAVENOUS at 13:48

## 2023-10-17 RX ADMIN — PREDNISONE 40 MG: 20 TABLET ORAL at 08:50

## 2023-10-17 ASSESSMENT — PAIN SCALES - GENERAL
PAINLEVEL_OUTOF10: 3
PAINLEVEL_OUTOF10: 5
PAINLEVEL_OUTOF10: 6
PAINLEVEL_OUTOF10: 6
PAINLEVEL_OUTOF10: 4
PAINLEVEL_OUTOF10: 3
PAINLEVEL_OUTOF10: 4
PAINLEVEL_OUTOF10: 3
PAINLEVEL_OUTOF10: 6

## 2023-10-17 ASSESSMENT — PAIN DESCRIPTION - ORIENTATION
ORIENTATION: RIGHT
ORIENTATION: LOWER
ORIENTATION: LOWER;RIGHT
ORIENTATION: LOWER
ORIENTATION: LOWER;RIGHT
ORIENTATION: RIGHT

## 2023-10-17 ASSESSMENT — PAIN DESCRIPTION - DESCRIPTORS
DESCRIPTORS: ACHING
DESCRIPTORS: ACHING;DISCOMFORT
DESCRIPTORS: ACHING
DESCRIPTORS: ACHING;DISCOMFORT
DESCRIPTORS: ACHING;DISCOMFORT

## 2023-10-17 ASSESSMENT — PAIN DESCRIPTION - LOCATION
LOCATION: BACK;LEG
LOCATION: BACK;HIP
LOCATION: LEG
LOCATION: BACK;LEG

## 2023-10-17 NOTE — CONSULTS
[x] Soft/non-tender  [] Normal appearance  [] Distended  [] Ascites  [] Other:  Neurological: [x] Normal speech  [] Normal sensation  []Deficits present:  Extremity: [x] Normal skin color/temp  [] Clubbing/cyanosis  [] No edema  [] Other: Wt Readings from Last 15 Encounters:   10/09/23 (!) 368 lb 2.7 oz (167 kg)   09/28/23 (!) 358 lb 7.5 oz (162.6 kg)   08/20/23 (!) 363 lb 4.8 oz (164.8 kg)   08/07/23 (!) 435 lb (197.3 kg)   05/31/23 (!) 355 lb 13.2 oz (161.4 kg)        Current Diet: DIET ONE TIME MESSAGE;  ADULT DIET; Regular       PSYCHOSOCIAL/SPIRITUAL SCREENING:   Palliative IDT has assessed this patient for cultural preferences / practices and a referral made as appropriate to needs (Cultural Services, Patient Advocacy, Ethics, etc.)    Spiritual Affiliation: Cheondoism    Any spiritual / Amish concerns:  [] Yes /  [x] No   If \"Yes\" to discuss with pastoral care during IDT     Does caregiver feel burdened by caring for their loved one:   [] Yes /  [x] No /  [] No Caregiver Present/Available [] No Caregiver [] Pt Lives at Facility  If \"Yes\" to discuss with social work during IDT    Anticipatory grief assessment:   [x] Normal  / [] Maladaptive     If \"Maladaptive\" to discuss with social work during IDT    ESAS Anxiety: Anxiety Score: Not anxious    ESAS Depression: Depression Score: Not depressed        LAB AND IMAGING FINDINGS:   Objective data reviewed:  labs, images, records, medication use, vitals, and chart     FINAL COMMENTS   Thank you for allowing Palliative Medicine to participate in the care of Rory Dumas. Only check if applicable and billing time based rather than MDM  [] The total encounter time on this service date was __60__ minutes which was spent performing a face-to-face encounter and personally completing the provider-level activities documented in the note. This includes time spent prior to the visit and after the visit in direct care of the patient.  This time does not include time
branches, but appears slightly decreased in bulk compared to   prior. 2.  New left upper lobe and lingular bronchus occlusions with postobstructive   volume loss and/or pneumonia as described above. 3.  Redemonstrated widespread osseous metastatic disease. 4.  Redemonstrated right breast nodules/masses. XR CHEST PORTABLE   Final Result   Significantly underpenetrated. Subtotal left hemithorax opacification. PA and   lateral radiographs recommended for better evaluation. CTA CHEST W WO CONTRAST PE Eval    Result Date: 10/5/2023  EXAM:  CTA CHEST W WO CONTRAST INDICATION: PE COMPARISON: Chest radiograph 10/5/2023, CTA chest 3/78/1104 TECHNIQUE: Helical thin section chest CT following uneventful intravenous administration of nonionic contrast 100 mL of isovue 370 according to departmental PE protocol. Coronal and sagittal reformats were performed. 3D post processing was performed. CT dose reduction was achieved through the use of a standardized protocol tailored for this examination and automatic exposure control for dose modulation. FINDINGS: This is a good quality study for the evaluation of pulmonary embolism to the first subsegmental arterial level. Redemonstrated right upper lobar pulmonary emboli extending into segmental and subsegmental branches, but appears slightly decreased in bulk compared to prior. THYROID: No nodule. CHEST: Redemonstrated are multiple nodular densities in the left right breast measuring up to 2.9 cm MEDIASTINUM: No mass or lymphadenopathy. AASHISH: No mass or lymphadenopathy. THORACIC AORTA: No aneurysm. HEART: Cardiomegaly. Coronary artery calcifications. ESOPHAGUS: No wall thickening or dilatation. TRACHEA/BRONCHI: New right upper lobe and lingular bronchus occlusions. PLEURA: No effusion or pneumothorax. LUNGS: New left upper lobe and lingular near complete collapse/consolidations with streaky and groundglass opacities in the residual aerated lung parenchyma.
errors. Please excuse any errors that have escaped final proofreading  ______________________________________________________________________      Thank you for allowing us to participate in the care of this patient.       This care involved high complexity medical decision making: I personally:  Reviewed the flowsheet and previous days notes  Reviewed and summarized records or history from previous days note or discussions with staff, family  High Risk Drug therapy requiring intensive monitoring for toxicity: eg steroids, antibiotics  Reviewed and/or ordered Clinical lab tests  Reviewed images and/or ordered Radiology tests  Reviewed the patients / Telemetry  discussed my assessment/management with : Nursing, Respiratory therapy, Hospitalist for coordination of care      LUIS ANGEL Smart NP
wall thickening or dilatation. TRACHEA/BRONCHI: New right upper lobe and lingular bronchus occlusions. PLEURA: No effusion or pneumothorax. LUNGS: New left upper lobe and lingular near complete collapse/consolidations with streaky and groundglass opacities in the residual aerated lung parenchyma. UPPER ABDOMEN: Partially imaged. No acute pathology. BONES: Redemonstrated widespread sclerotic osseous metastatic disease. .     1.  Redemonstrated right upper lobar pulmonary emboli extending into segmental and subsegmental branches, but appears slightly decreased in bulk compared to prior. 2.  New left upper lobe and lingular bronchus occlusions with postobstructive volume loss and/or pneumonia as described above. 3.  Redemonstrated widespread osseous metastatic disease. 4.  Redemonstrated right breast nodules/masses. XR CHEST PORTABLE    Result Date: 10/5/2023  PORTABLE CHEST RADIOGRAPH/S: 10/5/2023 8:13 PM INDICATION: Dyspnea. COMPARISON: CT 9/28/2023. TECHNIQUE: Portable frontal semiupright radiograph/s of the chest. FINDINGS: The radiograph is significantly underpenetrated secondary to patient body habitus and portable technique. Subtotal opacification of the left hemithorax is of unclear etiology. PA and lateral chest radiographs are recommended for better evaluation. Significantly underpenetrated. Subtotal left hemithorax opacification. PA and lateral radiographs recommended for better evaluation. Echo (TTE) complete (PRN contrast/bubble/strain/3D)    Result Date: 10/2/2023    Left Ventricle: Unable to assess left ventricular systolic function. Not well visualized. Right Ventricle: Not well visualized. Contrast used: Definity. Vascular duplex lower extremity venous bilateral    Result Date: 9/28/2023    No evidence of deep vein thrombosis in the right lower extremity. No evidence of deep vein or superficial vein thrombosis in the right lower extremity.  Vessels demonstrate normal compressibility,

## 2023-10-18 PROBLEM — Z78.9 FULL CODE STATUS: Status: ACTIVE | Noted: 2023-10-18

## 2023-10-18 PROBLEM — E11.8 CONTROLLED DIABETES MELLITUS TYPE 2 WITH COMPLICATIONS (HCC): Status: ACTIVE | Noted: 2023-08-25

## 2023-10-18 PROBLEM — Z51.5 PALLIATIVE CARE ENCOUNTER: Status: ACTIVE | Noted: 2023-10-18

## 2023-10-18 PROBLEM — J69.0 ASPIRATION PNEUMONIA OF BOTH UPPER LOBES DUE TO GASTRIC SECRETIONS (HCC): Status: ACTIVE | Noted: 2023-10-18

## 2023-10-18 PROBLEM — R52 PAIN: Status: ACTIVE | Noted: 2023-10-18

## 2023-10-18 PROBLEM — R78.81 RECURRENT BACTEREMIA: Status: ACTIVE | Noted: 2023-10-18

## 2023-10-18 PROBLEM — R78.81 COAGULASE NEGATIVE STAPHYLOCOCCUS BACTEREMIA: Status: ACTIVE | Noted: 2023-10-18

## 2023-10-18 PROBLEM — B95.7 COAGULASE NEGATIVE STAPHYLOCOCCUS BACTEREMIA: Status: ACTIVE | Noted: 2023-10-18

## 2023-10-18 LAB
ANION GAP SERPL CALC-SCNC: 4 MMOL/L (ref 5–15)
BASOPHILS # BLD: 0.1 K/UL (ref 0–0.1)
BASOPHILS NFR BLD: 0 % (ref 0–1)
BUN SERPL-MCNC: 20 MG/DL (ref 6–20)
BUN/CREAT SERPL: 27 (ref 12–20)
CALCIUM SERPL-MCNC: 10.3 MG/DL (ref 8.5–10.1)
CHLORIDE SERPL-SCNC: 99 MMOL/L (ref 97–108)
CO2 SERPL-SCNC: 35 MMOL/L (ref 21–32)
CREAT SERPL-MCNC: 0.73 MG/DL (ref 0.55–1.02)
DIFFERENTIAL METHOD BLD: ABNORMAL
EOSINOPHIL # BLD: 0.1 K/UL (ref 0–0.4)
EOSINOPHIL NFR BLD: 1 % (ref 0–7)
ERYTHROCYTE [DISTWIDTH] IN BLOOD BY AUTOMATED COUNT: 13 % (ref 11.5–14.5)
GLUCOSE BLD STRIP.AUTO-MCNC: 101 MG/DL (ref 65–117)
GLUCOSE BLD STRIP.AUTO-MCNC: 152 MG/DL (ref 65–117)
GLUCOSE BLD STRIP.AUTO-MCNC: 191 MG/DL (ref 65–117)
GLUCOSE BLD STRIP.AUTO-MCNC: 256 MG/DL (ref 65–117)
GLUCOSE SERPL-MCNC: 148 MG/DL (ref 65–100)
HCT VFR BLD AUTO: 39.4 % (ref 35–47)
HGB BLD-MCNC: 12.2 G/DL (ref 11.5–16)
IMM GRANULOCYTES # BLD AUTO: 0.1 K/UL (ref 0–0.04)
IMM GRANULOCYTES NFR BLD AUTO: 1 % (ref 0–0.5)
LYMPHOCYTES # BLD: 1.1 K/UL (ref 0.8–3.5)
LYMPHOCYTES NFR BLD: 9 % (ref 12–49)
M PNEUMO IGM SER IA-ACNC: NONREACTIVE
MCH RBC QN AUTO: 30.2 PG (ref 26–34)
MCHC RBC AUTO-ENTMCNC: 31 G/DL (ref 30–36.5)
MCV RBC AUTO: 97.5 FL (ref 80–99)
MONOCYTES # BLD: 1.1 K/UL (ref 0–1)
MONOCYTES NFR BLD: 9 % (ref 5–13)
NEUTS SEG # BLD: 10.3 K/UL (ref 1.8–8)
NEUTS SEG NFR BLD: 80 % (ref 32–75)
NRBC # BLD: 0 K/UL (ref 0–0.01)
NRBC BLD-RTO: 0 PER 100 WBC
PLATELET # BLD AUTO: 231 K/UL (ref 150–400)
PMV BLD AUTO: 10 FL (ref 8.9–12.9)
POTASSIUM SERPL-SCNC: 3.9 MMOL/L (ref 3.5–5.1)
RBC # BLD AUTO: 4.04 M/UL (ref 3.8–5.2)
SERVICE CMNT-IMP: ABNORMAL
SERVICE CMNT-IMP: NORMAL
SODIUM SERPL-SCNC: 138 MMOL/L (ref 136–145)
WBC # BLD AUTO: 12.7 K/UL (ref 3.6–11)

## 2023-10-18 PROCEDURE — 6370000000 HC RX 637 (ALT 250 FOR IP): Performed by: INTERNAL MEDICINE

## 2023-10-18 PROCEDURE — 2580000003 HC RX 258: Performed by: NURSE PRACTITIONER

## 2023-10-18 PROCEDURE — 05HD33Z INSERTION OF INFUSION DEVICE INTO RIGHT CEPHALIC VEIN, PERCUTANEOUS APPROACH: ICD-10-PCS | Performed by: STUDENT IN AN ORGANIZED HEALTH CARE EDUCATION/TRAINING PROGRAM

## 2023-10-18 PROCEDURE — 6360000002 HC RX W HCPCS: Performed by: NURSE PRACTITIONER

## 2023-10-18 PROCEDURE — 6370000000 HC RX 637 (ALT 250 FOR IP): Performed by: NURSE PRACTITIONER

## 2023-10-18 PROCEDURE — 76937 US GUIDE VASCULAR ACCESS: CPT

## 2023-10-18 PROCEDURE — 85025 COMPLETE CBC W/AUTO DIFF WBC: CPT

## 2023-10-18 PROCEDURE — 87449 NOS EACH ORGANISM AG IA: CPT

## 2023-10-18 PROCEDURE — 82962 GLUCOSE BLOOD TEST: CPT

## 2023-10-18 PROCEDURE — 94640 AIRWAY INHALATION TREATMENT: CPT

## 2023-10-18 PROCEDURE — 80048 BASIC METABOLIC PNL TOTAL CA: CPT

## 2023-10-18 PROCEDURE — C1751 CATH, INF, PER/CENT/MIDLINE: HCPCS

## 2023-10-18 PROCEDURE — C9113 INJ PANTOPRAZOLE SODIUM, VIA: HCPCS | Performed by: NURSE PRACTITIONER

## 2023-10-18 PROCEDURE — 97535 SELF CARE MNGMENT TRAINING: CPT | Performed by: OCCUPATIONAL THERAPIST

## 2023-10-18 PROCEDURE — 36415 COLL VENOUS BLD VENIPUNCTURE: CPT

## 2023-10-18 PROCEDURE — 1100000000 HC RM PRIVATE

## 2023-10-18 PROCEDURE — 2700000000 HC OXYGEN THERAPY PER DAY

## 2023-10-18 PROCEDURE — A4216 STERILE WATER/SALINE, 10 ML: HCPCS | Performed by: NURSE PRACTITIONER

## 2023-10-18 PROCEDURE — 94669 MECHANICAL CHEST WALL OSCILL: CPT

## 2023-10-18 PROCEDURE — 87305 ASPERGILLUS AG IA: CPT

## 2023-10-18 PROCEDURE — 92526 ORAL FUNCTION THERAPY: CPT

## 2023-10-18 PROCEDURE — 97530 THERAPEUTIC ACTIVITIES: CPT

## 2023-10-18 PROCEDURE — 2580000003 HC RX 258: Performed by: INTERNAL MEDICINE

## 2023-10-18 PROCEDURE — 86738 MYCOPLASMA ANTIBODY: CPT

## 2023-10-18 PROCEDURE — 36410 VNPNXR 3YR/> PHY/QHP DX/THER: CPT

## 2023-10-18 PROCEDURE — 94761 N-INVAS EAR/PLS OXIMETRY MLT: CPT

## 2023-10-18 PROCEDURE — 6360000002 HC RX W HCPCS: Performed by: INTERNAL MEDICINE

## 2023-10-18 RX ADMIN — SODIUM CHLORIDE, PRESERVATIVE FREE 10 ML: 5 INJECTION INTRAVENOUS at 21:50

## 2023-10-18 RX ADMIN — IPRATROPIUM BROMIDE AND ALBUTEROL SULFATE 1 DOSE: 2.5; .5 SOLUTION RESPIRATORY (INHALATION) at 12:03

## 2023-10-18 RX ADMIN — METHOCARBAMOL TABLETS 750 MG: 750 TABLET, COATED ORAL at 01:05

## 2023-10-18 RX ADMIN — ACETYLCYSTEINE 600 MG: 200 SOLUTION ORAL; RESPIRATORY (INHALATION) at 20:57

## 2023-10-18 RX ADMIN — SODIUM CHLORIDE 40 MG: 9 INJECTION INTRAMUSCULAR; INTRAVENOUS; SUBCUTANEOUS at 10:53

## 2023-10-18 RX ADMIN — Medication: at 11:02

## 2023-10-18 RX ADMIN — METHOCARBAMOL TABLETS 750 MG: 750 TABLET, COATED ORAL at 10:55

## 2023-10-18 RX ADMIN — APIXABAN 5 MG: 5 TABLET, FILM COATED ORAL at 10:54

## 2023-10-18 RX ADMIN — METHOCARBAMOL TABLETS 750 MG: 750 TABLET, COATED ORAL at 15:15

## 2023-10-18 RX ADMIN — MICONAZOLE NITRATE: 20 CREAM TOPICAL at 11:01

## 2023-10-18 RX ADMIN — APIXABAN 5 MG: 5 TABLET, FILM COATED ORAL at 21:58

## 2023-10-18 RX ADMIN — CYCLOBENZAPRINE 5 MG: 10 TABLET, FILM COATED ORAL at 13:50

## 2023-10-18 RX ADMIN — ACETYLCYSTEINE 600 MG: 200 SOLUTION ORAL; RESPIRATORY (INHALATION) at 10:17

## 2023-10-18 RX ADMIN — IPRATROPIUM BROMIDE AND ALBUTEROL SULFATE 1 DOSE: 2.5; .5 SOLUTION RESPIRATORY (INHALATION) at 15:31

## 2023-10-18 RX ADMIN — METOPROLOL TARTRATE 25 MG: 25 TABLET, FILM COATED ORAL at 21:59

## 2023-10-18 RX ADMIN — SODIUM CHLORIDE, PRESERVATIVE FREE 10 ML: 5 INJECTION INTRAVENOUS at 11:00

## 2023-10-18 RX ADMIN — PREDNISONE 40 MG: 20 TABLET ORAL at 10:53

## 2023-10-18 RX ADMIN — HYDROMORPHONE HYDROCHLORIDE 2 MG: 2 TABLET ORAL at 10:58

## 2023-10-18 RX ADMIN — INSULIN GLARGINE 40 UNITS: 100 INJECTION, SOLUTION SUBCUTANEOUS at 10:52

## 2023-10-18 RX ADMIN — HYDROMORPHONE HYDROCHLORIDE 2 MG: 2 TABLET ORAL at 06:32

## 2023-10-18 RX ADMIN — ANASTROZOLE 1 MG: 1 TABLET, COATED ORAL at 13:49

## 2023-10-18 RX ADMIN — PIPERACILLIN AND TAZOBACTAM 3375 MG: 3; .375 INJECTION, POWDER, LYOPHILIZED, FOR SOLUTION INTRAVENOUS at 22:07

## 2023-10-18 RX ADMIN — SENNOSIDES AND DOCUSATE SODIUM 2 TABLET: 50; 8.6 TABLET ORAL at 10:54

## 2023-10-18 RX ADMIN — GUAIFENESIN 1200 MG: 600 TABLET, EXTENDED RELEASE ORAL at 21:58

## 2023-10-18 RX ADMIN — GUAIFENESIN 1200 MG: 600 TABLET, EXTENDED RELEASE ORAL at 10:54

## 2023-10-18 RX ADMIN — METOPROLOL TARTRATE 25 MG: 25 TABLET, FILM COATED ORAL at 10:58

## 2023-10-18 RX ADMIN — PIPERACILLIN AND TAZOBACTAM 3375 MG: 3; .375 INJECTION, POWDER, LYOPHILIZED, FOR SOLUTION INTRAVENOUS at 13:49

## 2023-10-18 RX ADMIN — ACETAMINOPHEN 650 MG: 325 TABLET ORAL at 16:49

## 2023-10-18 RX ADMIN — IPRATROPIUM BROMIDE AND ALBUTEROL SULFATE 1 DOSE: 2.5; .5 SOLUTION RESPIRATORY (INHALATION) at 10:17

## 2023-10-18 RX ADMIN — ACETAMINOPHEN 650 MG: 325 TABLET ORAL at 03:23

## 2023-10-18 RX ADMIN — ASPIRIN 81 MG: 81 TABLET, CHEWABLE ORAL at 10:53

## 2023-10-18 RX ADMIN — ATORVASTATIN CALCIUM 20 MG: 20 TABLET, FILM COATED ORAL at 21:58

## 2023-10-18 RX ADMIN — MICONAZOLE NITRATE: 20 CREAM TOPICAL at 21:49

## 2023-10-18 RX ADMIN — CYCLOBENZAPRINE 5 MG: 10 TABLET, FILM COATED ORAL at 16:49

## 2023-10-18 RX ADMIN — CYCLOBENZAPRINE 5 MG: 10 TABLET, FILM COATED ORAL at 21:58

## 2023-10-18 RX ADMIN — BUDESONIDE 500 MCG: 0.5 INHALANT RESPIRATORY (INHALATION) at 10:17

## 2023-10-18 RX ADMIN — FUROSEMIDE 40 MG: 10 INJECTION, SOLUTION INTRAMUSCULAR; INTRAVENOUS at 10:59

## 2023-10-18 RX ADMIN — IPRATROPIUM BROMIDE AND ALBUTEROL SULFATE 1 DOSE: 2.5; .5 SOLUTION RESPIRATORY (INHALATION) at 20:58

## 2023-10-18 ASSESSMENT — PAIN SCALES - GENERAL
PAINLEVEL_OUTOF10: 6
PAINLEVEL_OUTOF10: 2
PAINLEVEL_OUTOF10: 7
PAINLEVEL_OUTOF10: 7
PAINLEVEL_OUTOF10: 6

## 2023-10-18 ASSESSMENT — PAIN DESCRIPTION - ORIENTATION
ORIENTATION: POSTERIOR;LOWER;RIGHT
ORIENTATION: LOWER;RIGHT
ORIENTATION: POSTERIOR
ORIENTATION: RIGHT;POSTERIOR
ORIENTATION: LOWER;RIGHT
ORIENTATION: RIGHT

## 2023-10-18 ASSESSMENT — PAIN DESCRIPTION - LOCATION
LOCATION: HEAD
LOCATION: BACK;LEG
LOCATION: BACK;LEG
LOCATION: BACK;HIP
LOCATION: BACK;HIP
LOCATION: BACK;LEG

## 2023-10-18 ASSESSMENT — PAIN DESCRIPTION - ONSET
ONSET: ON-GOING
ONSET: ON-GOING

## 2023-10-18 ASSESSMENT — PAIN DESCRIPTION - DESCRIPTORS
DESCRIPTORS: ACHING
DESCRIPTORS: THROBBING
DESCRIPTORS: ACHING
DESCRIPTORS: THROBBING
DESCRIPTORS: ACHING
DESCRIPTORS: ACHING

## 2023-10-18 ASSESSMENT — PAIN DESCRIPTION - FREQUENCY
FREQUENCY: CONTINUOUS
FREQUENCY: CONTINUOUS

## 2023-10-18 ASSESSMENT — PAIN DESCRIPTION - PAIN TYPE
TYPE: CHRONIC PAIN
TYPE: CHRONIC PAIN

## 2023-10-19 LAB
ANION GAP SERPL CALC-SCNC: 3 MMOL/L (ref 5–15)
BACTERIA SPEC CULT: NORMAL
BASOPHILS # BLD: 0 K/UL (ref 0–0.1)
BASOPHILS NFR BLD: 0 % (ref 0–1)
BUN SERPL-MCNC: 20 MG/DL (ref 6–20)
BUN/CREAT SERPL: 27 (ref 12–20)
CALCIUM SERPL-MCNC: 10 MG/DL (ref 8.5–10.1)
CHLORIDE SERPL-SCNC: 100 MMOL/L (ref 97–108)
CO2 SERPL-SCNC: 35 MMOL/L (ref 21–32)
CREAT SERPL-MCNC: 0.74 MG/DL (ref 0.55–1.02)
DIFFERENTIAL METHOD BLD: ABNORMAL
EOSINOPHIL # BLD: 0 K/UL (ref 0–0.4)
EOSINOPHIL NFR BLD: 0 % (ref 0–7)
ERYTHROCYTE [DISTWIDTH] IN BLOOD BY AUTOMATED COUNT: 13 % (ref 11.5–14.5)
FLUID CULTURE: NORMAL
GLUCOSE BLD STRIP.AUTO-MCNC: 111 MG/DL (ref 65–117)
GLUCOSE BLD STRIP.AUTO-MCNC: 154 MG/DL (ref 65–117)
GLUCOSE BLD STRIP.AUTO-MCNC: 196 MG/DL (ref 65–117)
GLUCOSE BLD STRIP.AUTO-MCNC: 339 MG/DL (ref 65–117)
GLUCOSE SERPL-MCNC: 161 MG/DL (ref 65–100)
GRAM STN SPEC: NORMAL
HCT VFR BLD AUTO: 38.3 % (ref 35–47)
HGB BLD-MCNC: 11.6 G/DL (ref 11.5–16)
IMM GRANULOCYTES # BLD AUTO: 0.1 K/UL (ref 0–0.04)
IMM GRANULOCYTES NFR BLD AUTO: 1 % (ref 0–0.5)
L PNEUMO1 AG UR QL IA: NEGATIVE
LYMPHOCYTES # BLD: 0.9 K/UL (ref 0.8–3.5)
LYMPHOCYTES NFR BLD: 8 % (ref 12–49)
Lab: NORMAL
MCH RBC QN AUTO: 29.9 PG (ref 26–34)
MCHC RBC AUTO-ENTMCNC: 30.3 G/DL (ref 30–36.5)
MCV RBC AUTO: 98.7 FL (ref 80–99)
MONOCYTES # BLD: 1 K/UL (ref 0–1)
MONOCYTES NFR BLD: 9 % (ref 5–13)
NEUTS SEG # BLD: 9 K/UL (ref 1.8–8)
NEUTS SEG NFR BLD: 82 % (ref 32–75)
NRBC # BLD: 0 K/UL (ref 0–0.01)
NRBC BLD-RTO: 0 PER 100 WBC
ORGANISM ID: NORMAL
PLATELET # BLD AUTO: 204 K/UL (ref 150–400)
PMV BLD AUTO: 9.9 FL (ref 8.9–12.9)
POTASSIUM SERPL-SCNC: 4 MMOL/L (ref 3.5–5.1)
RBC # BLD AUTO: 3.88 M/UL (ref 3.8–5.2)
S PNEUM AG SPEC QL LA: NEGATIVE
SERVICE CMNT-IMP: ABNORMAL
SERVICE CMNT-IMP: NORMAL
SERVICE CMNT-IMP: NORMAL
SODIUM SERPL-SCNC: 138 MMOL/L (ref 136–145)
SPECIMEN SOURCE: NORMAL
SPECIMEN SOURCE: NORMAL
SPECIMEN: NORMAL
WBC # BLD AUTO: 11.1 K/UL (ref 3.6–11)

## 2023-10-19 PROCEDURE — 99232 SBSQ HOSP IP/OBS MODERATE 35: CPT | Performed by: INTERNAL MEDICINE

## 2023-10-19 PROCEDURE — 6360000002 HC RX W HCPCS: Performed by: NURSE PRACTITIONER

## 2023-10-19 PROCEDURE — 2580000003 HC RX 258: Performed by: NURSE PRACTITIONER

## 2023-10-19 PROCEDURE — 99497 ADVNCD CARE PLAN 30 MIN: CPT | Performed by: INTERNAL MEDICINE

## 2023-10-19 PROCEDURE — 82962 GLUCOSE BLOOD TEST: CPT

## 2023-10-19 PROCEDURE — 2500000003 HC RX 250 WO HCPCS: Performed by: NURSE PRACTITIONER

## 2023-10-19 PROCEDURE — 99498 ADVNCD CARE PLAN ADDL 30 MIN: CPT | Performed by: INTERNAL MEDICINE

## 2023-10-19 PROCEDURE — 1100000000 HC RM PRIVATE

## 2023-10-19 PROCEDURE — 6370000000 HC RX 637 (ALT 250 FOR IP): Performed by: INTERNAL MEDICINE

## 2023-10-19 PROCEDURE — 85025 COMPLETE CBC W/AUTO DIFF WBC: CPT

## 2023-10-19 PROCEDURE — 6370000000 HC RX 637 (ALT 250 FOR IP): Performed by: NURSE PRACTITIONER

## 2023-10-19 PROCEDURE — 80048 BASIC METABOLIC PNL TOTAL CA: CPT

## 2023-10-19 PROCEDURE — 2700000000 HC OXYGEN THERAPY PER DAY

## 2023-10-19 PROCEDURE — 2580000003 HC RX 258: Performed by: INTERNAL MEDICINE

## 2023-10-19 PROCEDURE — A4216 STERILE WATER/SALINE, 10 ML: HCPCS | Performed by: NURSE PRACTITIONER

## 2023-10-19 PROCEDURE — 6360000002 HC RX W HCPCS: Performed by: INTERNAL MEDICINE

## 2023-10-19 PROCEDURE — C9113 INJ PANTOPRAZOLE SODIUM, VIA: HCPCS | Performed by: NURSE PRACTITIONER

## 2023-10-19 PROCEDURE — 94761 N-INVAS EAR/PLS OXIMETRY MLT: CPT

## 2023-10-19 PROCEDURE — 94640 AIRWAY INHALATION TREATMENT: CPT

## 2023-10-19 PROCEDURE — 36415 COLL VENOUS BLD VENIPUNCTURE: CPT

## 2023-10-19 RX ORDER — CASTOR OIL AND BALSAM, PERU 788; 87 MG/G; MG/G
OINTMENT TOPICAL 2 TIMES DAILY
Status: DISCONTINUED | OUTPATIENT
Start: 2023-10-19 | End: 2023-10-25 | Stop reason: HOSPADM

## 2023-10-19 RX ADMIN — METOPROLOL TARTRATE 25 MG: 25 TABLET, FILM COATED ORAL at 09:30

## 2023-10-19 RX ADMIN — APIXABAN 5 MG: 5 TABLET, FILM COATED ORAL at 09:30

## 2023-10-19 RX ADMIN — POLYETHYLENE GLYCOL 3350 17 G: 17 POWDER, FOR SOLUTION ORAL at 21:03

## 2023-10-19 RX ADMIN — IPRATROPIUM BROMIDE AND ALBUTEROL SULFATE 1 DOSE: 2.5; .5 SOLUTION RESPIRATORY (INHALATION) at 15:32

## 2023-10-19 RX ADMIN — BUDESONIDE 500 MCG: 0.5 INHALANT RESPIRATORY (INHALATION) at 08:54

## 2023-10-19 RX ADMIN — ASPIRIN 81 MG: 81 TABLET, CHEWABLE ORAL at 09:30

## 2023-10-19 RX ADMIN — MICONAZOLE NITRATE: 2 POWDER TOPICAL at 23:11

## 2023-10-19 RX ADMIN — METOPROLOL TARTRATE 25 MG: 25 TABLET, FILM COATED ORAL at 21:04

## 2023-10-19 RX ADMIN — PIPERACILLIN AND TAZOBACTAM 3375 MG: 3; .375 INJECTION, POWDER, LYOPHILIZED, FOR SOLUTION INTRAVENOUS at 12:37

## 2023-10-19 RX ADMIN — PIPERACILLIN AND TAZOBACTAM 3375 MG: 3; .375 INJECTION, POWDER, LYOPHILIZED, FOR SOLUTION INTRAVENOUS at 05:01

## 2023-10-19 RX ADMIN — SODIUM CHLORIDE, PRESERVATIVE FREE 10 ML: 5 INJECTION INTRAVENOUS at 21:10

## 2023-10-19 RX ADMIN — HYDROMORPHONE HYDROCHLORIDE 2 MG: 2 TABLET ORAL at 09:33

## 2023-10-19 RX ADMIN — CYCLOBENZAPRINE 5 MG: 10 TABLET, FILM COATED ORAL at 16:47

## 2023-10-19 RX ADMIN — GUAIFENESIN AND DEXTROMETHORPHAN 5 ML: 100; 10 SYRUP ORAL at 09:46

## 2023-10-19 RX ADMIN — CYCLOBENZAPRINE 5 MG: 10 TABLET, FILM COATED ORAL at 21:05

## 2023-10-19 RX ADMIN — Medication: at 09:32

## 2023-10-19 RX ADMIN — HYDROMORPHONE HYDROCHLORIDE 2 MG: 2 TABLET ORAL at 21:06

## 2023-10-19 RX ADMIN — METHOCARBAMOL TABLETS 750 MG: 750 TABLET, COATED ORAL at 09:30

## 2023-10-19 RX ADMIN — PIPERACILLIN AND TAZOBACTAM 3375 MG: 3; .375 INJECTION, POWDER, LYOPHILIZED, FOR SOLUTION INTRAVENOUS at 23:06

## 2023-10-19 RX ADMIN — SODIUM CHLORIDE 40 MG: 9 INJECTION INTRAMUSCULAR; INTRAVENOUS; SUBCUTANEOUS at 09:31

## 2023-10-19 RX ADMIN — GUAIFENESIN 1200 MG: 600 TABLET, EXTENDED RELEASE ORAL at 21:06

## 2023-10-19 RX ADMIN — ANASTROZOLE 1 MG: 1 TABLET, COATED ORAL at 09:30

## 2023-10-19 RX ADMIN — GUAIFENESIN 1200 MG: 600 TABLET, EXTENDED RELEASE ORAL at 09:28

## 2023-10-19 RX ADMIN — ATORVASTATIN CALCIUM 20 MG: 20 TABLET, FILM COATED ORAL at 21:08

## 2023-10-19 RX ADMIN — ACETYLCYSTEINE 600 MG: 200 SOLUTION ORAL; RESPIRATORY (INHALATION) at 20:43

## 2023-10-19 RX ADMIN — IPRATROPIUM BROMIDE AND ALBUTEROL SULFATE 1 DOSE: 2.5; .5 SOLUTION RESPIRATORY (INHALATION) at 20:43

## 2023-10-19 RX ADMIN — CYCLOBENZAPRINE 5 MG: 10 TABLET, FILM COATED ORAL at 12:37

## 2023-10-19 RX ADMIN — ACETYLCYSTEINE 600 MG: 200 SOLUTION ORAL; RESPIRATORY (INHALATION) at 08:46

## 2023-10-19 RX ADMIN — BUDESONIDE 500 MCG: 0.5 INHALANT RESPIRATORY (INHALATION) at 20:43

## 2023-10-19 RX ADMIN — SENNOSIDES AND DOCUSATE SODIUM 2 TABLET: 50; 8.6 TABLET ORAL at 09:28

## 2023-10-19 RX ADMIN — INSULIN GLARGINE 40 UNITS: 100 INJECTION, SOLUTION SUBCUTANEOUS at 09:31

## 2023-10-19 RX ADMIN — IPRATROPIUM BROMIDE AND ALBUTEROL SULFATE 1 DOSE: 2.5; .5 SOLUTION RESPIRATORY (INHALATION) at 08:46

## 2023-10-19 RX ADMIN — ACETAMINOPHEN 650 MG: 325 TABLET ORAL at 04:45

## 2023-10-19 RX ADMIN — APIXABAN 5 MG: 5 TABLET, FILM COATED ORAL at 21:09

## 2023-10-19 RX ADMIN — MICONAZOLE NITRATE: 20 CREAM TOPICAL at 09:33

## 2023-10-19 RX ADMIN — METHOCARBAMOL TABLETS 750 MG: 750 TABLET, COATED ORAL at 14:01

## 2023-10-19 RX ADMIN — PREDNISONE 40 MG: 20 TABLET ORAL at 09:30

## 2023-10-19 RX ADMIN — INSULIN LISPRO 12 UNITS: 100 INJECTION, SOLUTION INTRAVENOUS; SUBCUTANEOUS at 16:46

## 2023-10-19 RX ADMIN — SODIUM CHLORIDE, PRESERVATIVE FREE 10 ML: 5 INJECTION INTRAVENOUS at 09:32

## 2023-10-19 RX ADMIN — FUROSEMIDE 20 MG: 10 INJECTION, SOLUTION INTRAMUSCULAR; INTRAVENOUS at 09:28

## 2023-10-19 ASSESSMENT — PAIN DESCRIPTION - LOCATION
LOCATION: BACK
LOCATION: LEG
LOCATION: BACK

## 2023-10-19 ASSESSMENT — PAIN DESCRIPTION - ORIENTATION
ORIENTATION: RIGHT
ORIENTATION: LOWER;UPPER
ORIENTATION: LEFT;ANTERIOR;LOWER

## 2023-10-19 ASSESSMENT — PAIN SCALES - GENERAL
PAINLEVEL_OUTOF10: 4
PAINLEVEL_OUTOF10: 7
PAINLEVEL_OUTOF10: 4
PAINLEVEL_OUTOF10: 7
PAINLEVEL_OUTOF10: 4

## 2023-10-19 ASSESSMENT — PAIN DESCRIPTION - DESCRIPTORS
DESCRIPTORS: ACHING
DESCRIPTORS: ACHING;SHARP
DESCRIPTORS: ACHING

## 2023-10-19 ASSESSMENT — PAIN - FUNCTIONAL ASSESSMENT: PAIN_FUNCTIONAL_ASSESSMENT: ACTIVITIES ARE NOT PREVENTED

## 2023-10-19 ASSESSMENT — PAIN DESCRIPTION - PAIN TYPE: TYPE: CHRONIC PAIN

## 2023-10-19 NOTE — ACP (ADVANCE CARE PLANNING)
Advance Care Planning     Advance Care Planning (ACP) Physician/NP/PA (Provider) [facility]      Date of ACP Conversation: 10/5/2023    Conversation Conducted with:   Patient with Decision Making Capacity and with June, [address]:    Current Designated Health Care Decision Maker:    Primary Decision Maker: Gregory Ion) - Other - [phone]    Secondary Decision Maker: Tiffany Conway - [phone]    Care Preferences:    Hospitalization: \"If your health worsens and it becomes clear that your chance of recovery is unlikely, what would your preference be regarding hospitalization? \"  If the patient would want hospitalization, answer \"yes\". If the patient would prefer comfort-focused treatment without hospitalization, answer \"no\". YES/NO/WILD CARDS: yes      Ventilation: \"If you were in your present state of health and suddenly became very ill and were unable to breathe on your own, what would your preference be about the use of a ventilator (breathing machine) if it was available to you? \"     NO         Resuscitation:  \"CPR works best to restart the heart when there is a sudden event, like a heart attack, in someone who is otherwise healthy. Unfortunately, CPR does not typically restart the heart for people who have serious health conditions or who are very sick. \"    \"In the event your heart stopped as a result of an underlying serious health condition, would you want attempts to be made to restart your heart  no     NOTE: If the patient has a valid advance directive AND provides care preference(s) that are inconsistent with that prior directive, advise the patient to consider either: creating a new advance directive that complies with state-specific requirements; or, if that is not possible, orally revoking that prior directive in accordance with state-specific requirements, which must be documented in the EHR.     Conversation Outcomes / Follow-Up Plan:

## 2023-10-20 LAB
1,3 BETA GLUCAN SER-MCNC: <31 PG/ML
ANION GAP SERPL CALC-SCNC: 3 MMOL/L (ref 5–15)
BACTERIA SPEC CULT: NORMAL
BACTERIA SPEC CULT: NORMAL
BASOPHILS # BLD: 0.1 K/UL (ref 0–0.1)
BASOPHILS NFR BLD: 1 % (ref 0–1)
BUN SERPL-MCNC: 18 MG/DL (ref 6–20)
BUN/CREAT SERPL: 30 (ref 12–20)
CALCIUM SERPL-MCNC: 9.8 MG/DL (ref 8.5–10.1)
CHLORIDE SERPL-SCNC: 100 MMOL/L (ref 97–108)
CO2 SERPL-SCNC: 34 MMOL/L (ref 21–32)
CREAT SERPL-MCNC: 0.61 MG/DL (ref 0.55–1.02)
DIFFERENTIAL METHOD BLD: ABNORMAL
EOSINOPHIL # BLD: 0 K/UL (ref 0–0.4)
EOSINOPHIL NFR BLD: 0 % (ref 0–7)
ERYTHROCYTE [DISTWIDTH] IN BLOOD BY AUTOMATED COUNT: 13 % (ref 11.5–14.5)
GLUCOSE BLD STRIP.AUTO-MCNC: 104 MG/DL (ref 65–117)
GLUCOSE BLD STRIP.AUTO-MCNC: 182 MG/DL (ref 65–117)
GLUCOSE BLD STRIP.AUTO-MCNC: 267 MG/DL (ref 65–117)
GLUCOSE BLD STRIP.AUTO-MCNC: 287 MG/DL (ref 65–117)
GLUCOSE SERPL-MCNC: 138 MG/DL (ref 65–100)
HCT VFR BLD AUTO: 35.9 % (ref 35–47)
HGB BLD-MCNC: 11 G/DL (ref 11.5–16)
IMM GRANULOCYTES # BLD AUTO: 0.1 K/UL (ref 0–0.04)
IMM GRANULOCYTES NFR BLD AUTO: 1 % (ref 0–0.5)
LYMPHOCYTES # BLD: 0.9 K/UL (ref 0.8–3.5)
LYMPHOCYTES NFR BLD: 8 % (ref 12–49)
MAGNESIUM SERPL-MCNC: 2.2 MG/DL (ref 1.6–2.4)
MCH RBC QN AUTO: 29.6 PG (ref 26–34)
MCHC RBC AUTO-ENTMCNC: 30.6 G/DL (ref 30–36.5)
MCV RBC AUTO: 96.8 FL (ref 80–99)
MONOCYTES # BLD: 1.1 K/UL (ref 0–1)
MONOCYTES NFR BLD: 10 % (ref 5–13)
NEUTS SEG # BLD: 9 K/UL (ref 1.8–8)
NEUTS SEG NFR BLD: 80 % (ref 32–75)
NRBC # BLD: 0 K/UL (ref 0–0.01)
NRBC BLD-RTO: 0 PER 100 WBC
PLATELET # BLD AUTO: 206 K/UL (ref 150–400)
PMV BLD AUTO: 10.4 FL (ref 8.9–12.9)
POTASSIUM SERPL-SCNC: 3.9 MMOL/L (ref 3.5–5.1)
RBC # BLD AUTO: 3.71 M/UL (ref 3.8–5.2)
SERVICE CMNT-IMP: ABNORMAL
SERVICE CMNT-IMP: NORMAL
SODIUM SERPL-SCNC: 137 MMOL/L (ref 136–145)
WBC # BLD AUTO: 11.1 K/UL (ref 3.6–11)

## 2023-10-20 PROCEDURE — 2580000003 HC RX 258: Performed by: INTERNAL MEDICINE

## 2023-10-20 PROCEDURE — 94640 AIRWAY INHALATION TREATMENT: CPT

## 2023-10-20 PROCEDURE — 6370000000 HC RX 637 (ALT 250 FOR IP): Performed by: NURSE PRACTITIONER

## 2023-10-20 PROCEDURE — 6360000002 HC RX W HCPCS: Performed by: NURSE PRACTITIONER

## 2023-10-20 PROCEDURE — 2580000003 HC RX 258: Performed by: NURSE PRACTITIONER

## 2023-10-20 PROCEDURE — 83735 ASSAY OF MAGNESIUM: CPT

## 2023-10-20 PROCEDURE — 6370000000 HC RX 637 (ALT 250 FOR IP): Performed by: INTERNAL MEDICINE

## 2023-10-20 PROCEDURE — 2700000000 HC OXYGEN THERAPY PER DAY

## 2023-10-20 PROCEDURE — 1100000000 HC RM PRIVATE

## 2023-10-20 PROCEDURE — C9113 INJ PANTOPRAZOLE SODIUM, VIA: HCPCS | Performed by: NURSE PRACTITIONER

## 2023-10-20 PROCEDURE — 94669 MECHANICAL CHEST WALL OSCILL: CPT

## 2023-10-20 PROCEDURE — 6360000002 HC RX W HCPCS: Performed by: INTERNAL MEDICINE

## 2023-10-20 PROCEDURE — 82962 GLUCOSE BLOOD TEST: CPT

## 2023-10-20 PROCEDURE — 80048 BASIC METABOLIC PNL TOTAL CA: CPT

## 2023-10-20 PROCEDURE — 85025 COMPLETE CBC W/AUTO DIFF WBC: CPT

## 2023-10-20 PROCEDURE — 92526 ORAL FUNCTION THERAPY: CPT

## 2023-10-20 PROCEDURE — A4216 STERILE WATER/SALINE, 10 ML: HCPCS | Performed by: NURSE PRACTITIONER

## 2023-10-20 PROCEDURE — 94761 N-INVAS EAR/PLS OXIMETRY MLT: CPT

## 2023-10-20 PROCEDURE — 36415 COLL VENOUS BLD VENIPUNCTURE: CPT

## 2023-10-20 RX ORDER — PANTOPRAZOLE SODIUM 40 MG/1
40 TABLET, DELAYED RELEASE ORAL
Status: DISCONTINUED | OUTPATIENT
Start: 2023-10-21 | End: 2023-10-25 | Stop reason: HOSPADM

## 2023-10-20 RX ADMIN — METOPROLOL TARTRATE 25 MG: 25 TABLET, FILM COATED ORAL at 21:39

## 2023-10-20 RX ADMIN — ACETAMINOPHEN 650 MG: 325 TABLET ORAL at 03:29

## 2023-10-20 RX ADMIN — INSULIN GLARGINE 40 UNITS: 100 INJECTION, SOLUTION SUBCUTANEOUS at 09:00

## 2023-10-20 RX ADMIN — IPRATROPIUM BROMIDE AND ALBUTEROL SULFATE 1 DOSE: 2.5; .5 SOLUTION RESPIRATORY (INHALATION) at 08:21

## 2023-10-20 RX ADMIN — MICONAZOLE NITRATE: 2 POWDER TOPICAL at 21:41

## 2023-10-20 RX ADMIN — FUROSEMIDE 40 MG: 10 INJECTION, SOLUTION INTRAMUSCULAR; INTRAVENOUS at 10:40

## 2023-10-20 RX ADMIN — METHOCARBAMOL TABLETS 750 MG: 750 TABLET, COATED ORAL at 00:40

## 2023-10-20 RX ADMIN — POLYETHYLENE GLYCOL 3350 17 G: 17 POWDER, FOR SOLUTION ORAL at 10:39

## 2023-10-20 RX ADMIN — CYCLOBENZAPRINE 5 MG: 10 TABLET, FILM COATED ORAL at 17:33

## 2023-10-20 RX ADMIN — APIXABAN 5 MG: 5 TABLET, FILM COATED ORAL at 10:39

## 2023-10-20 RX ADMIN — Medication: at 11:04

## 2023-10-20 RX ADMIN — METOPROLOL TARTRATE 25 MG: 25 TABLET, FILM COATED ORAL at 10:39

## 2023-10-20 RX ADMIN — BUDESONIDE 500 MCG: 0.5 INHALANT RESPIRATORY (INHALATION) at 08:26

## 2023-10-20 RX ADMIN — INSULIN LISPRO 8 UNITS: 100 INJECTION, SOLUTION INTRAVENOUS; SUBCUTANEOUS at 17:33

## 2023-10-20 RX ADMIN — METHOCARBAMOL TABLETS 750 MG: 750 TABLET, COATED ORAL at 15:32

## 2023-10-20 RX ADMIN — IPRATROPIUM BROMIDE AND ALBUTEROL SULFATE 1 DOSE: 2.5; .5 SOLUTION RESPIRATORY (INHALATION) at 15:19

## 2023-10-20 RX ADMIN — CYCLOBENZAPRINE 5 MG: 10 TABLET, FILM COATED ORAL at 12:42

## 2023-10-20 RX ADMIN — SENNOSIDES AND DOCUSATE SODIUM 2 TABLET: 50; 8.6 TABLET ORAL at 10:38

## 2023-10-20 RX ADMIN — ASPIRIN 81 MG: 81 TABLET, CHEWABLE ORAL at 10:38

## 2023-10-20 RX ADMIN — MICONAZOLE NITRATE: 2 POWDER TOPICAL at 11:05

## 2023-10-20 RX ADMIN — SODIUM CHLORIDE 40 MG: 9 INJECTION INTRAMUSCULAR; INTRAVENOUS; SUBCUTANEOUS at 10:41

## 2023-10-20 RX ADMIN — IPRATROPIUM BROMIDE AND ALBUTEROL SULFATE 1 DOSE: 2.5; .5 SOLUTION RESPIRATORY (INHALATION) at 20:29

## 2023-10-20 RX ADMIN — BUDESONIDE 500 MCG: 0.5 INHALANT RESPIRATORY (INHALATION) at 20:29

## 2023-10-20 RX ADMIN — PIPERACILLIN AND TAZOBACTAM 3375 MG: 3; .375 INJECTION, POWDER, LYOPHILIZED, FOR SOLUTION INTRAVENOUS at 15:33

## 2023-10-20 RX ADMIN — PIPERACILLIN AND TAZOBACTAM 3375 MG: 3; .375 INJECTION, POWDER, LYOPHILIZED, FOR SOLUTION INTRAVENOUS at 06:54

## 2023-10-20 RX ADMIN — ACETYLCYSTEINE 600 MG: 200 SOLUTION ORAL; RESPIRATORY (INHALATION) at 20:29

## 2023-10-20 RX ADMIN — PIPERACILLIN AND TAZOBACTAM 3375 MG: 3; .375 INJECTION, POWDER, LYOPHILIZED, FOR SOLUTION INTRAVENOUS at 21:40

## 2023-10-20 RX ADMIN — ACETAMINOPHEN 650 MG: 325 TABLET ORAL at 17:39

## 2023-10-20 RX ADMIN — GUAIFENESIN 1200 MG: 600 TABLET, EXTENDED RELEASE ORAL at 10:39

## 2023-10-20 RX ADMIN — METHOCARBAMOL TABLETS 750 MG: 750 TABLET, COATED ORAL at 10:38

## 2023-10-20 RX ADMIN — APIXABAN 5 MG: 5 TABLET, FILM COATED ORAL at 21:39

## 2023-10-20 RX ADMIN — CYCLOBENZAPRINE 5 MG: 10 TABLET, FILM COATED ORAL at 20:04

## 2023-10-20 RX ADMIN — PREDNISONE 40 MG: 20 TABLET ORAL at 10:39

## 2023-10-20 RX ADMIN — ANASTROZOLE 1 MG: 1 TABLET, COATED ORAL at 11:04

## 2023-10-20 RX ADMIN — ACETYLCYSTEINE 600 MG: 200 SOLUTION ORAL; RESPIRATORY (INHALATION) at 08:21

## 2023-10-20 RX ADMIN — SODIUM CHLORIDE, PRESERVATIVE FREE 10 ML: 5 INJECTION INTRAVENOUS at 10:49

## 2023-10-20 RX ADMIN — Medication: at 10:45

## 2023-10-20 RX ADMIN — HYDROMORPHONE HYDROCHLORIDE 2 MG: 2 TABLET ORAL at 11:02

## 2023-10-20 RX ADMIN — GUAIFENESIN 1200 MG: 600 TABLET, EXTENDED RELEASE ORAL at 21:39

## 2023-10-20 RX ADMIN — ATORVASTATIN CALCIUM 20 MG: 20 TABLET, FILM COATED ORAL at 21:40

## 2023-10-20 RX ADMIN — Medication: at 21:41

## 2023-10-20 ASSESSMENT — PAIN DESCRIPTION - ORIENTATION
ORIENTATION: POSTERIOR
ORIENTATION: RIGHT

## 2023-10-20 ASSESSMENT — PAIN DESCRIPTION - PAIN TYPE: TYPE: ACUTE PAIN

## 2023-10-20 ASSESSMENT — PAIN SCALES - GENERAL
PAINLEVEL_OUTOF10: 6
PAINLEVEL_OUTOF10: 6
PAINLEVEL_OUTOF10: 1
PAINLEVEL_OUTOF10: 3
PAINLEVEL_OUTOF10: 1

## 2023-10-20 ASSESSMENT — PAIN DESCRIPTION - DESCRIPTORS
DESCRIPTORS: ACHING
DESCRIPTORS: ACHING;THROBBING

## 2023-10-20 ASSESSMENT — PAIN DESCRIPTION - LOCATION
LOCATION: BACK;LEG
LOCATION: BACK

## 2023-10-21 LAB
ANION GAP SERPL CALC-SCNC: 2 MMOL/L (ref 5–15)
BASOPHILS # BLD: 0 K/UL (ref 0–0.1)
BASOPHILS NFR BLD: 0 % (ref 0–1)
BUN SERPL-MCNC: 18 MG/DL (ref 6–20)
BUN/CREAT SERPL: 26 (ref 12–20)
CALCIUM SERPL-MCNC: 10 MG/DL (ref 8.5–10.1)
CHLORIDE SERPL-SCNC: 101 MMOL/L (ref 97–108)
CO2 SERPL-SCNC: 36 MMOL/L (ref 21–32)
CREAT SERPL-MCNC: 0.68 MG/DL (ref 0.55–1.02)
DIFFERENTIAL METHOD BLD: ABNORMAL
EOSINOPHIL # BLD: 0 K/UL (ref 0–0.4)
EOSINOPHIL NFR BLD: 0 % (ref 0–7)
ERYTHROCYTE [DISTWIDTH] IN BLOOD BY AUTOMATED COUNT: 12.9 % (ref 11.5–14.5)
GLUCOSE BLD STRIP.AUTO-MCNC: 167 MG/DL (ref 65–117)
GLUCOSE BLD STRIP.AUTO-MCNC: 168 MG/DL (ref 65–117)
GLUCOSE BLD STRIP.AUTO-MCNC: 197 MG/DL (ref 65–117)
GLUCOSE BLD STRIP.AUTO-MCNC: 326 MG/DL (ref 65–117)
GLUCOSE SERPL-MCNC: 125 MG/DL (ref 65–100)
HCT VFR BLD AUTO: 36 % (ref 35–47)
HGB BLD-MCNC: 11.3 G/DL (ref 11.5–16)
IMM GRANULOCYTES # BLD AUTO: 0.1 K/UL (ref 0–0.04)
IMM GRANULOCYTES NFR BLD AUTO: 1 % (ref 0–0.5)
LYMPHOCYTES # BLD: 0.8 K/UL (ref 0.8–3.5)
LYMPHOCYTES NFR BLD: 8 % (ref 12–49)
MCH RBC QN AUTO: 30.6 PG (ref 26–34)
MCHC RBC AUTO-ENTMCNC: 31.4 G/DL (ref 30–36.5)
MCV RBC AUTO: 97.6 FL (ref 80–99)
MONOCYTES # BLD: 1 K/UL (ref 0–1)
MONOCYTES NFR BLD: 10 % (ref 5–13)
NEUTS SEG # BLD: 8.5 K/UL (ref 1.8–8)
NEUTS SEG NFR BLD: 81 % (ref 32–75)
NRBC # BLD: 0 K/UL (ref 0–0.01)
NRBC BLD-RTO: 0 PER 100 WBC
PLATELET # BLD AUTO: 198 K/UL (ref 150–400)
PMV BLD AUTO: 10.2 FL (ref 8.9–12.9)
POTASSIUM SERPL-SCNC: 3.8 MMOL/L (ref 3.5–5.1)
RBC # BLD AUTO: 3.69 M/UL (ref 3.8–5.2)
SERVICE CMNT-IMP: ABNORMAL
SODIUM SERPL-SCNC: 139 MMOL/L (ref 136–145)
WBC # BLD AUTO: 10.5 K/UL (ref 3.6–11)

## 2023-10-21 PROCEDURE — 6370000000 HC RX 637 (ALT 250 FOR IP): Performed by: NURSE PRACTITIONER

## 2023-10-21 PROCEDURE — 1100000000 HC RM PRIVATE

## 2023-10-21 PROCEDURE — 6370000000 HC RX 637 (ALT 250 FOR IP): Performed by: INTERNAL MEDICINE

## 2023-10-21 PROCEDURE — 82962 GLUCOSE BLOOD TEST: CPT

## 2023-10-21 PROCEDURE — 94640 AIRWAY INHALATION TREATMENT: CPT

## 2023-10-21 PROCEDURE — 36415 COLL VENOUS BLD VENIPUNCTURE: CPT

## 2023-10-21 PROCEDURE — 6360000002 HC RX W HCPCS: Performed by: NURSE PRACTITIONER

## 2023-10-21 PROCEDURE — 85025 COMPLETE CBC W/AUTO DIFF WBC: CPT

## 2023-10-21 PROCEDURE — 80048 BASIC METABOLIC PNL TOTAL CA: CPT

## 2023-10-21 PROCEDURE — 2580000003 HC RX 258: Performed by: INTERNAL MEDICINE

## 2023-10-21 PROCEDURE — 94669 MECHANICAL CHEST WALL OSCILL: CPT

## 2023-10-21 PROCEDURE — 2580000003 HC RX 258: Performed by: NURSE PRACTITIONER

## 2023-10-21 PROCEDURE — 94761 N-INVAS EAR/PLS OXIMETRY MLT: CPT

## 2023-10-21 PROCEDURE — 2700000000 HC OXYGEN THERAPY PER DAY

## 2023-10-21 PROCEDURE — 6360000002 HC RX W HCPCS: Performed by: INTERNAL MEDICINE

## 2023-10-21 RX ORDER — FUROSEMIDE 10 MG/ML
20 INJECTION INTRAMUSCULAR; INTRAVENOUS ONCE
Status: COMPLETED | OUTPATIENT
Start: 2023-10-21 | End: 2023-10-21

## 2023-10-21 RX ORDER — PREDNISONE 20 MG/1
20 TABLET ORAL DAILY
Status: DISCONTINUED | OUTPATIENT
Start: 2023-10-22 | End: 2023-10-25 | Stop reason: HOSPADM

## 2023-10-21 RX ADMIN — ACETYLCYSTEINE 600 MG: 200 SOLUTION ORAL; RESPIRATORY (INHALATION) at 09:05

## 2023-10-21 RX ADMIN — BUDESONIDE 500 MCG: 0.5 INHALANT RESPIRATORY (INHALATION) at 19:52

## 2023-10-21 RX ADMIN — IPRATROPIUM BROMIDE AND ALBUTEROL SULFATE 1 DOSE: 2.5; .5 SOLUTION RESPIRATORY (INHALATION) at 16:51

## 2023-10-21 RX ADMIN — HYDROMORPHONE HYDROCHLORIDE 2 MG: 2 TABLET ORAL at 22:08

## 2023-10-21 RX ADMIN — BUDESONIDE 500 MCG: 0.5 INHALANT RESPIRATORY (INHALATION) at 09:10

## 2023-10-21 RX ADMIN — INSULIN LISPRO 12 UNITS: 100 INJECTION, SOLUTION INTRAVENOUS; SUBCUTANEOUS at 16:43

## 2023-10-21 RX ADMIN — ACETAMINOPHEN 650 MG: 325 TABLET ORAL at 15:30

## 2023-10-21 RX ADMIN — PIPERACILLIN AND TAZOBACTAM 3375 MG: 3; .375 INJECTION, POWDER, LYOPHILIZED, FOR SOLUTION INTRAVENOUS at 21:25

## 2023-10-21 RX ADMIN — ATORVASTATIN CALCIUM 20 MG: 20 TABLET, FILM COATED ORAL at 21:24

## 2023-10-21 RX ADMIN — CYCLOBENZAPRINE 5 MG: 10 TABLET, FILM COATED ORAL at 21:04

## 2023-10-21 RX ADMIN — METHOCARBAMOL TABLETS 750 MG: 750 TABLET, COATED ORAL at 09:13

## 2023-10-21 RX ADMIN — ASPIRIN 81 MG: 81 TABLET, CHEWABLE ORAL at 09:13

## 2023-10-21 RX ADMIN — POLYETHYLENE GLYCOL 3350 17 G: 17 POWDER, FOR SOLUTION ORAL at 21:48

## 2023-10-21 RX ADMIN — METHOCARBAMOL TABLETS 750 MG: 750 TABLET, COATED ORAL at 13:03

## 2023-10-21 RX ADMIN — METHOCARBAMOL TABLETS 750 MG: 750 TABLET, COATED ORAL at 01:32

## 2023-10-21 RX ADMIN — MICONAZOLE NITRATE: 2 POWDER TOPICAL at 09:22

## 2023-10-21 RX ADMIN — INSULIN GLARGINE 40 UNITS: 100 INJECTION, SOLUTION SUBCUTANEOUS at 09:12

## 2023-10-21 RX ADMIN — FUROSEMIDE 20 MG: 10 INJECTION, SOLUTION INTRAMUSCULAR; INTRAVENOUS at 21:25

## 2023-10-21 RX ADMIN — ACETAMINOPHEN 650 MG: 325 TABLET ORAL at 02:30

## 2023-10-21 RX ADMIN — APIXABAN 5 MG: 5 TABLET, FILM COATED ORAL at 09:13

## 2023-10-21 RX ADMIN — CYCLOBENZAPRINE 5 MG: 10 TABLET, FILM COATED ORAL at 13:03

## 2023-10-21 RX ADMIN — GUAIFENESIN 1200 MG: 600 TABLET, EXTENDED RELEASE ORAL at 21:24

## 2023-10-21 RX ADMIN — METOPROLOL TARTRATE 25 MG: 25 TABLET, FILM COATED ORAL at 09:12

## 2023-10-21 RX ADMIN — PANTOPRAZOLE SODIUM 40 MG: 40 TABLET, DELAYED RELEASE ORAL at 06:07

## 2023-10-21 RX ADMIN — GUAIFENESIN 1200 MG: 600 TABLET, EXTENDED RELEASE ORAL at 09:13

## 2023-10-21 RX ADMIN — PIPERACILLIN AND TAZOBACTAM 3375 MG: 3; .375 INJECTION, POWDER, LYOPHILIZED, FOR SOLUTION INTRAVENOUS at 06:07

## 2023-10-21 RX ADMIN — IPRATROPIUM BROMIDE AND ALBUTEROL SULFATE 1 DOSE: 2.5; .5 SOLUTION RESPIRATORY (INHALATION) at 09:05

## 2023-10-21 RX ADMIN — Medication: at 09:22

## 2023-10-21 RX ADMIN — MICONAZOLE NITRATE: 2 POWDER TOPICAL at 22:09

## 2023-10-21 RX ADMIN — ANASTROZOLE 1 MG: 1 TABLET, COATED ORAL at 09:13

## 2023-10-21 RX ADMIN — HYDROMORPHONE HYDROCHLORIDE 2 MG: 2 TABLET ORAL at 09:32

## 2023-10-21 RX ADMIN — SENNOSIDES AND DOCUSATE SODIUM 2 TABLET: 50; 8.6 TABLET ORAL at 09:13

## 2023-10-21 RX ADMIN — FUROSEMIDE 20 MG: 10 INJECTION, SOLUTION INTRAMUSCULAR; INTRAVENOUS at 09:12

## 2023-10-21 RX ADMIN — SODIUM CHLORIDE, PRESERVATIVE FREE 10 ML: 5 INJECTION INTRAVENOUS at 09:17

## 2023-10-21 RX ADMIN — IPRATROPIUM BROMIDE AND ALBUTEROL SULFATE 1 DOSE: 2.5; .5 SOLUTION RESPIRATORY (INHALATION) at 12:53

## 2023-10-21 RX ADMIN — Medication: at 22:09

## 2023-10-21 RX ADMIN — CYCLOBENZAPRINE 5 MG: 10 TABLET, FILM COATED ORAL at 15:30

## 2023-10-21 RX ADMIN — APIXABAN 5 MG: 5 TABLET, FILM COATED ORAL at 21:24

## 2023-10-21 RX ADMIN — IPRATROPIUM BROMIDE AND ALBUTEROL SULFATE 1 DOSE: 2.5; .5 SOLUTION RESPIRATORY (INHALATION) at 19:45

## 2023-10-21 RX ADMIN — SODIUM CHLORIDE, PRESERVATIVE FREE 10 ML: 5 INJECTION INTRAVENOUS at 21:26

## 2023-10-21 RX ADMIN — METOPROLOL TARTRATE 25 MG: 25 TABLET, FILM COATED ORAL at 21:24

## 2023-10-21 RX ADMIN — PREDNISONE 40 MG: 20 TABLET ORAL at 09:13

## 2023-10-21 RX ADMIN — ACETYLCYSTEINE 600 MG: 200 SOLUTION ORAL; RESPIRATORY (INHALATION) at 19:52

## 2023-10-21 RX ADMIN — PIPERACILLIN AND TAZOBACTAM 3375 MG: 3; .375 INJECTION, POWDER, LYOPHILIZED, FOR SOLUTION INTRAVENOUS at 13:03

## 2023-10-21 ASSESSMENT — PAIN SCALES - GENERAL
PAINLEVEL_OUTOF10: 6
PAINLEVEL_OUTOF10: 6
PAINLEVEL_OUTOF10: 7
PAINLEVEL_OUTOF10: 6
PAINLEVEL_OUTOF10: 6

## 2023-10-21 ASSESSMENT — PAIN DESCRIPTION - LOCATION
LOCATION: FLANK
LOCATION: LEG
LOCATION: BACK;HIP
LOCATION: LEG

## 2023-10-21 ASSESSMENT — PAIN DESCRIPTION - DESCRIPTORS
DESCRIPTORS: ACHING

## 2023-10-21 ASSESSMENT — PAIN DESCRIPTION - ORIENTATION
ORIENTATION: RIGHT
ORIENTATION: LEFT

## 2023-10-22 LAB
ANION GAP SERPL CALC-SCNC: 4 MMOL/L (ref 5–15)
BASOPHILS # BLD: 0.1 K/UL (ref 0–0.1)
BASOPHILS NFR BLD: 0 % (ref 0–1)
BUN SERPL-MCNC: 24 MG/DL (ref 6–20)
BUN/CREAT SERPL: 31 (ref 12–20)
CALCIUM SERPL-MCNC: 10.3 MG/DL (ref 8.5–10.1)
CHLORIDE SERPL-SCNC: 101 MMOL/L (ref 97–108)
CO2 SERPL-SCNC: 33 MMOL/L (ref 21–32)
CREAT SERPL-MCNC: 0.78 MG/DL (ref 0.55–1.02)
DIFFERENTIAL METHOD BLD: ABNORMAL
EOSINOPHIL # BLD: 0 K/UL (ref 0–0.4)
EOSINOPHIL NFR BLD: 0 % (ref 0–7)
ERYTHROCYTE [DISTWIDTH] IN BLOOD BY AUTOMATED COUNT: 13 % (ref 11.5–14.5)
GLUCOSE BLD STRIP.AUTO-MCNC: 115 MG/DL (ref 65–117)
GLUCOSE BLD STRIP.AUTO-MCNC: 156 MG/DL (ref 65–117)
GLUCOSE BLD STRIP.AUTO-MCNC: 199 MG/DL (ref 65–117)
GLUCOSE BLD STRIP.AUTO-MCNC: 231 MG/DL (ref 65–117)
GLUCOSE SERPL-MCNC: 185 MG/DL (ref 65–100)
HCT VFR BLD AUTO: 36.5 % (ref 35–47)
HGB BLD-MCNC: 11.3 G/DL (ref 11.5–16)
IMM GRANULOCYTES # BLD AUTO: 0.1 K/UL (ref 0–0.04)
IMM GRANULOCYTES NFR BLD AUTO: 1 % (ref 0–0.5)
LYMPHOCYTES # BLD: 0.9 K/UL (ref 0.8–3.5)
LYMPHOCYTES NFR BLD: 8 % (ref 12–49)
MCH RBC QN AUTO: 29.6 PG (ref 26–34)
MCHC RBC AUTO-ENTMCNC: 31 G/DL (ref 30–36.5)
MCV RBC AUTO: 95.5 FL (ref 80–99)
MONOCYTES # BLD: 1.1 K/UL (ref 0–1)
MONOCYTES NFR BLD: 9 % (ref 5–13)
NEUTS SEG # BLD: 10 K/UL (ref 1.8–8)
NEUTS SEG NFR BLD: 82 % (ref 32–75)
NRBC # BLD: 0 K/UL (ref 0–0.01)
NRBC BLD-RTO: 0 PER 100 WBC
PLATELET # BLD AUTO: 215 K/UL (ref 150–400)
PMV BLD AUTO: 10.2 FL (ref 8.9–12.9)
POTASSIUM SERPL-SCNC: 4 MMOL/L (ref 3.5–5.1)
RBC # BLD AUTO: 3.82 M/UL (ref 3.8–5.2)
SERVICE CMNT-IMP: ABNORMAL
SERVICE CMNT-IMP: NORMAL
SODIUM SERPL-SCNC: 138 MMOL/L (ref 136–145)
WBC # BLD AUTO: 12.2 K/UL (ref 3.6–11)

## 2023-10-22 PROCEDURE — 80048 BASIC METABOLIC PNL TOTAL CA: CPT

## 2023-10-22 PROCEDURE — 2700000000 HC OXYGEN THERAPY PER DAY

## 2023-10-22 PROCEDURE — 85025 COMPLETE CBC W/AUTO DIFF WBC: CPT

## 2023-10-22 PROCEDURE — 94761 N-INVAS EAR/PLS OXIMETRY MLT: CPT

## 2023-10-22 PROCEDURE — 6370000000 HC RX 637 (ALT 250 FOR IP): Performed by: INTERNAL MEDICINE

## 2023-10-22 PROCEDURE — 6370000000 HC RX 637 (ALT 250 FOR IP): Performed by: NURSE PRACTITIONER

## 2023-10-22 PROCEDURE — 36415 COLL VENOUS BLD VENIPUNCTURE: CPT

## 2023-10-22 PROCEDURE — 6360000002 HC RX W HCPCS: Performed by: NURSE PRACTITIONER

## 2023-10-22 PROCEDURE — 94669 MECHANICAL CHEST WALL OSCILL: CPT

## 2023-10-22 PROCEDURE — 2580000003 HC RX 258: Performed by: INTERNAL MEDICINE

## 2023-10-22 PROCEDURE — 82962 GLUCOSE BLOOD TEST: CPT

## 2023-10-22 PROCEDURE — 94760 N-INVAS EAR/PLS OXIMETRY 1: CPT

## 2023-10-22 PROCEDURE — 1100000000 HC RM PRIVATE

## 2023-10-22 PROCEDURE — 6360000002 HC RX W HCPCS: Performed by: INTERNAL MEDICINE

## 2023-10-22 PROCEDURE — 94640 AIRWAY INHALATION TREATMENT: CPT

## 2023-10-22 PROCEDURE — 2580000003 HC RX 258: Performed by: NURSE PRACTITIONER

## 2023-10-22 PROCEDURE — 2500000003 HC RX 250 WO HCPCS: Performed by: NURSE PRACTITIONER

## 2023-10-22 RX ORDER — FUROSEMIDE 10 MG/ML
20 INJECTION INTRAMUSCULAR; INTRAVENOUS ONCE
Status: COMPLETED | OUTPATIENT
Start: 2023-10-22 | End: 2023-10-22

## 2023-10-22 RX ORDER — POLYETHYLENE GLYCOL 3350 17 G/17G
17 POWDER, FOR SOLUTION ORAL DAILY
Status: DISCONTINUED | OUTPATIENT
Start: 2023-10-23 | End: 2023-10-25 | Stop reason: HOSPADM

## 2023-10-22 RX ORDER — FUROSEMIDE 10 MG/ML
20 INJECTION INTRAMUSCULAR; INTRAVENOUS 2 TIMES DAILY
Status: DISCONTINUED | OUTPATIENT
Start: 2023-10-22 | End: 2023-10-22

## 2023-10-22 RX ORDER — FUROSEMIDE 10 MG/ML
20 INJECTION INTRAMUSCULAR; INTRAVENOUS 2 TIMES DAILY
Status: DISCONTINUED | OUTPATIENT
Start: 2023-10-23 | End: 2023-10-25

## 2023-10-22 RX ADMIN — METOPROLOL TARTRATE 25 MG: 25 TABLET, FILM COATED ORAL at 21:21

## 2023-10-22 RX ADMIN — Medication: at 09:03

## 2023-10-22 RX ADMIN — HYDROMORPHONE HYDROCHLORIDE 2 MG: 2 TABLET ORAL at 23:44

## 2023-10-22 RX ADMIN — GUAIFENESIN 1200 MG: 600 TABLET, EXTENDED RELEASE ORAL at 21:21

## 2023-10-22 RX ADMIN — GUAIFENESIN AND DEXTROMETHORPHAN 5 ML: 100; 10 SYRUP ORAL at 12:21

## 2023-10-22 RX ADMIN — SODIUM CHLORIDE, PRESERVATIVE FREE 10 ML: 5 INJECTION INTRAVENOUS at 08:57

## 2023-10-22 RX ADMIN — MICONAZOLE NITRATE: 2 POWDER TOPICAL at 21:24

## 2023-10-22 RX ADMIN — FUROSEMIDE 20 MG: 10 INJECTION, SOLUTION INTRAMUSCULAR; INTRAVENOUS at 08:55

## 2023-10-22 RX ADMIN — Medication: at 21:23

## 2023-10-22 RX ADMIN — PIPERACILLIN AND TAZOBACTAM 3375 MG: 3; .375 INJECTION, POWDER, LYOPHILIZED, FOR SOLUTION INTRAVENOUS at 21:22

## 2023-10-22 RX ADMIN — APIXABAN 5 MG: 5 TABLET, FILM COATED ORAL at 08:56

## 2023-10-22 RX ADMIN — ANASTROZOLE 1 MG: 1 TABLET, COATED ORAL at 08:56

## 2023-10-22 RX ADMIN — ASPIRIN 81 MG: 81 TABLET, CHEWABLE ORAL at 08:56

## 2023-10-22 RX ADMIN — IPRATROPIUM BROMIDE AND ALBUTEROL SULFATE 1 DOSE: 2.5; .5 SOLUTION RESPIRATORY (INHALATION) at 08:10

## 2023-10-22 RX ADMIN — ACETAMINOPHEN 650 MG: 325 TABLET ORAL at 16:27

## 2023-10-22 RX ADMIN — METOPROLOL TARTRATE 25 MG: 25 TABLET, FILM COATED ORAL at 08:56

## 2023-10-22 RX ADMIN — PANTOPRAZOLE SODIUM 40 MG: 40 TABLET, DELAYED RELEASE ORAL at 05:33

## 2023-10-22 RX ADMIN — BUDESONIDE 500 MCG: 0.5 INHALANT RESPIRATORY (INHALATION) at 08:15

## 2023-10-22 RX ADMIN — INSULIN GLARGINE 40 UNITS: 100 INJECTION, SOLUTION SUBCUTANEOUS at 08:56

## 2023-10-22 RX ADMIN — ACETYLCYSTEINE 600 MG: 200 SOLUTION ORAL; RESPIRATORY (INHALATION) at 20:12

## 2023-10-22 RX ADMIN — METHOCARBAMOL TABLETS 750 MG: 750 TABLET, COATED ORAL at 12:21

## 2023-10-22 RX ADMIN — CYCLOBENZAPRINE 5 MG: 10 TABLET, FILM COATED ORAL at 21:25

## 2023-10-22 RX ADMIN — PIPERACILLIN AND TAZOBACTAM 3375 MG: 3; .375 INJECTION, POWDER, LYOPHILIZED, FOR SOLUTION INTRAVENOUS at 05:33

## 2023-10-22 RX ADMIN — IPRATROPIUM BROMIDE AND ALBUTEROL SULFATE 1 DOSE: 2.5; .5 SOLUTION RESPIRATORY (INHALATION) at 17:04

## 2023-10-22 RX ADMIN — ATORVASTATIN CALCIUM 20 MG: 20 TABLET, FILM COATED ORAL at 21:21

## 2023-10-22 RX ADMIN — APIXABAN 5 MG: 5 TABLET, FILM COATED ORAL at 21:21

## 2023-10-22 RX ADMIN — FUROSEMIDE 20 MG: 10 INJECTION, SOLUTION INTRAMUSCULAR; INTRAVENOUS at 18:55

## 2023-10-22 RX ADMIN — IPRATROPIUM BROMIDE AND ALBUTEROL SULFATE 1 DOSE: 2.5; .5 SOLUTION RESPIRATORY (INHALATION) at 12:30

## 2023-10-22 RX ADMIN — ACETYLCYSTEINE 600 MG: 200 SOLUTION ORAL; RESPIRATORY (INHALATION) at 08:10

## 2023-10-22 RX ADMIN — SENNOSIDES AND DOCUSATE SODIUM 2 TABLET: 50; 8.6 TABLET ORAL at 08:56

## 2023-10-22 RX ADMIN — GUAIFENESIN 1200 MG: 600 TABLET, EXTENDED RELEASE ORAL at 08:56

## 2023-10-22 RX ADMIN — CYCLOBENZAPRINE 5 MG: 10 TABLET, FILM COATED ORAL at 12:21

## 2023-10-22 RX ADMIN — MICONAZOLE NITRATE: 2 POWDER TOPICAL at 12:25

## 2023-10-22 RX ADMIN — SODIUM CHLORIDE, PRESERVATIVE FREE 10 ML: 5 INJECTION INTRAVENOUS at 21:24

## 2023-10-22 RX ADMIN — PIPERACILLIN AND TAZOBACTAM 3375 MG: 3; .375 INJECTION, POWDER, LYOPHILIZED, FOR SOLUTION INTRAVENOUS at 12:21

## 2023-10-22 RX ADMIN — METHOCARBAMOL TABLETS 750 MG: 750 TABLET, COATED ORAL at 01:00

## 2023-10-22 RX ADMIN — IPRATROPIUM BROMIDE AND ALBUTEROL SULFATE 1 DOSE: 2.5; .5 SOLUTION RESPIRATORY (INHALATION) at 20:12

## 2023-10-22 RX ADMIN — ACETAMINOPHEN 650 MG: 325 TABLET ORAL at 03:53

## 2023-10-22 RX ADMIN — PREDNISONE 20 MG: 20 TABLET ORAL at 08:56

## 2023-10-22 RX ADMIN — BUDESONIDE 500 MCG: 0.5 INHALANT RESPIRATORY (INHALATION) at 20:17

## 2023-10-22 RX ADMIN — METHOCARBAMOL TABLETS 750 MG: 750 TABLET, COATED ORAL at 08:54

## 2023-10-22 RX ADMIN — INSULIN LISPRO 4 UNITS: 100 INJECTION, SOLUTION INTRAVENOUS; SUBCUTANEOUS at 16:28

## 2023-10-22 RX ADMIN — CYCLOBENZAPRINE 5 MG: 10 TABLET, FILM COATED ORAL at 16:28

## 2023-10-22 ASSESSMENT — PAIN SCALES - GENERAL
PAINLEVEL_OUTOF10: 7
PAINLEVEL_OUTOF10: 4
PAINLEVEL_OUTOF10: 6
PAINLEVEL_OUTOF10: 6
PAINLEVEL_OUTOF10: 4
PAINLEVEL_OUTOF10: 6
PAINLEVEL_OUTOF10: 5

## 2023-10-22 ASSESSMENT — PAIN DESCRIPTION - LOCATION
LOCATION: FLANK;LEG
LOCATION: LEG;BUTTOCKS
LOCATION: BACK;SACRUM
LOCATION: HIP;BACK
LOCATION: LEG

## 2023-10-22 ASSESSMENT — PAIN DESCRIPTION - ORIENTATION
ORIENTATION: RIGHT
ORIENTATION: RIGHT
ORIENTATION: POSTERIOR;ANTERIOR;LOWER

## 2023-10-22 ASSESSMENT — PAIN DESCRIPTION - DESCRIPTORS
DESCRIPTORS: ACHING
DESCRIPTORS: THROBBING
DESCRIPTORS: ACHING

## 2023-10-22 ASSESSMENT — PAIN DESCRIPTION - FREQUENCY
FREQUENCY: CONTINUOUS
FREQUENCY: CONTINUOUS

## 2023-10-23 LAB
ANION GAP SERPL CALC-SCNC: 3 MMOL/L (ref 5–15)
BASOPHILS # BLD: 0.1 K/UL (ref 0–0.1)
BASOPHILS NFR BLD: 0 % (ref 0–1)
BUN SERPL-MCNC: 24 MG/DL (ref 6–20)
BUN/CREAT SERPL: 29 (ref 12–20)
CALCIUM SERPL-MCNC: 9.9 MG/DL (ref 8.5–10.1)
CHLORIDE SERPL-SCNC: 101 MMOL/L (ref 97–108)
CO2 SERPL-SCNC: 34 MMOL/L (ref 21–32)
CREAT SERPL-MCNC: 0.83 MG/DL (ref 0.55–1.02)
DIFFERENTIAL METHOD BLD: ABNORMAL
EOSINOPHIL # BLD: 0.1 K/UL (ref 0–0.4)
EOSINOPHIL NFR BLD: 1 % (ref 0–7)
ERYTHROCYTE [DISTWIDTH] IN BLOOD BY AUTOMATED COUNT: 13.1 % (ref 11.5–14.5)
GALACTOMANNAN AG SPEC IA-ACNC: 0.04 INDEX (ref 0–0.49)
GLUCOSE BLD STRIP.AUTO-MCNC: 118 MG/DL (ref 65–117)
GLUCOSE BLD STRIP.AUTO-MCNC: 162 MG/DL (ref 65–117)
GLUCOSE BLD STRIP.AUTO-MCNC: 211 MG/DL (ref 65–117)
GLUCOSE BLD STRIP.AUTO-MCNC: 255 MG/DL (ref 65–117)
GLUCOSE SERPL-MCNC: 152 MG/DL (ref 65–100)
HCT VFR BLD AUTO: 37.2 % (ref 35–47)
HGB BLD-MCNC: 11.4 G/DL (ref 11.5–16)
IMM GRANULOCYTES # BLD AUTO: 0.1 K/UL (ref 0–0.04)
IMM GRANULOCYTES NFR BLD AUTO: 1 % (ref 0–0.5)
LYMPHOCYTES # BLD: 1 K/UL (ref 0.8–3.5)
LYMPHOCYTES NFR BLD: 8 % (ref 12–49)
MCH RBC QN AUTO: 29.4 PG (ref 26–34)
MCHC RBC AUTO-ENTMCNC: 30.6 G/DL (ref 30–36.5)
MCV RBC AUTO: 95.9 FL (ref 80–99)
MONOCYTES # BLD: 1.1 K/UL (ref 0–1)
MONOCYTES NFR BLD: 9 % (ref 5–13)
NEUTS SEG # BLD: 9.5 K/UL (ref 1.8–8)
NEUTS SEG NFR BLD: 81 % (ref 32–75)
NRBC # BLD: 0 K/UL (ref 0–0.01)
NRBC BLD-RTO: 0 PER 100 WBC
PLATELET # BLD AUTO: 213 K/UL (ref 150–400)
PMV BLD AUTO: 10.6 FL (ref 8.9–12.9)
POTASSIUM SERPL-SCNC: 3.8 MMOL/L (ref 3.5–5.1)
RBC # BLD AUTO: 3.88 M/UL (ref 3.8–5.2)
SERVICE CMNT-IMP: ABNORMAL
SODIUM SERPL-SCNC: 138 MMOL/L (ref 136–145)
WBC # BLD AUTO: 11.7 K/UL (ref 3.6–11)

## 2023-10-23 PROCEDURE — 6360000002 HC RX W HCPCS: Performed by: STUDENT IN AN ORGANIZED HEALTH CARE EDUCATION/TRAINING PROGRAM

## 2023-10-23 PROCEDURE — 6370000000 HC RX 637 (ALT 250 FOR IP): Performed by: NURSE PRACTITIONER

## 2023-10-23 PROCEDURE — 1100000000 HC RM PRIVATE

## 2023-10-23 PROCEDURE — 6370000000 HC RX 637 (ALT 250 FOR IP): Performed by: INTERNAL MEDICINE

## 2023-10-23 PROCEDURE — 94761 N-INVAS EAR/PLS OXIMETRY MLT: CPT

## 2023-10-23 PROCEDURE — 85025 COMPLETE CBC W/AUTO DIFF WBC: CPT

## 2023-10-23 PROCEDURE — 6360000002 HC RX W HCPCS: Performed by: NURSE PRACTITIONER

## 2023-10-23 PROCEDURE — 36415 COLL VENOUS BLD VENIPUNCTURE: CPT

## 2023-10-23 PROCEDURE — 2580000003 HC RX 258: Performed by: STUDENT IN AN ORGANIZED HEALTH CARE EDUCATION/TRAINING PROGRAM

## 2023-10-23 PROCEDURE — 2700000000 HC OXYGEN THERAPY PER DAY

## 2023-10-23 PROCEDURE — 2580000003 HC RX 258: Performed by: NURSE PRACTITIONER

## 2023-10-23 PROCEDURE — 2580000003 HC RX 258: Performed by: INTERNAL MEDICINE

## 2023-10-23 PROCEDURE — 82962 GLUCOSE BLOOD TEST: CPT

## 2023-10-23 PROCEDURE — 80048 BASIC METABOLIC PNL TOTAL CA: CPT

## 2023-10-23 PROCEDURE — 94640 AIRWAY INHALATION TREATMENT: CPT

## 2023-10-23 RX ORDER — HYDROMORPHONE HYDROCHLORIDE 2 MG/1
2 TABLET ORAL EVERY 8 HOURS PRN
Status: DISCONTINUED | OUTPATIENT
Start: 2023-10-23 | End: 2023-10-24

## 2023-10-23 RX ORDER — NALOXONE HYDROCHLORIDE 0.4 MG/ML
0.4 INJECTION, SOLUTION INTRAMUSCULAR; INTRAVENOUS; SUBCUTANEOUS PRN
Status: DISCONTINUED | OUTPATIENT
Start: 2023-10-23 | End: 2023-10-25 | Stop reason: HOSPADM

## 2023-10-23 RX ADMIN — HYDROMORPHONE HYDROCHLORIDE 2 MG: 2 TABLET ORAL at 10:00

## 2023-10-23 RX ADMIN — ATORVASTATIN CALCIUM 20 MG: 20 TABLET, FILM COATED ORAL at 21:40

## 2023-10-23 RX ADMIN — ASPIRIN 81 MG: 81 TABLET, CHEWABLE ORAL at 09:44

## 2023-10-23 RX ADMIN — ACETAMINOPHEN 650 MG: 325 TABLET ORAL at 16:04

## 2023-10-23 RX ADMIN — MICONAZOLE NITRATE: 2 POWDER TOPICAL at 21:40

## 2023-10-23 RX ADMIN — SODIUM CHLORIDE, PRESERVATIVE FREE 10 ML: 5 INJECTION INTRAVENOUS at 21:40

## 2023-10-23 RX ADMIN — METHOCARBAMOL TABLETS 750 MG: 750 TABLET, COATED ORAL at 09:42

## 2023-10-23 RX ADMIN — MICONAZOLE NITRATE: 2 POWDER TOPICAL at 09:50

## 2023-10-23 RX ADMIN — CYCLOBENZAPRINE 5 MG: 10 TABLET, FILM COATED ORAL at 11:57

## 2023-10-23 RX ADMIN — IPRATROPIUM BROMIDE AND ALBUTEROL SULFATE 1 DOSE: 2.5; .5 SOLUTION RESPIRATORY (INHALATION) at 12:07

## 2023-10-23 RX ADMIN — PANTOPRAZOLE SODIUM 40 MG: 40 TABLET, DELAYED RELEASE ORAL at 07:02

## 2023-10-23 RX ADMIN — CYCLOBENZAPRINE 5 MG: 10 TABLET, FILM COATED ORAL at 21:37

## 2023-10-23 RX ADMIN — Medication: at 10:01

## 2023-10-23 RX ADMIN — APIXABAN 5 MG: 5 TABLET, FILM COATED ORAL at 21:36

## 2023-10-23 RX ADMIN — METOPROLOL TARTRATE 25 MG: 25 TABLET, FILM COATED ORAL at 09:44

## 2023-10-23 RX ADMIN — GUAIFENESIN 1200 MG: 600 TABLET, EXTENDED RELEASE ORAL at 21:49

## 2023-10-23 RX ADMIN — FUROSEMIDE 20 MG: 10 INJECTION, SOLUTION INTRAMUSCULAR; INTRAVENOUS at 09:40

## 2023-10-23 RX ADMIN — IPRATROPIUM BROMIDE AND ALBUTEROL SULFATE 1 DOSE: 2.5; .5 SOLUTION RESPIRATORY (INHALATION) at 20:22

## 2023-10-23 RX ADMIN — ACETYLCYSTEINE 600 MG: 200 SOLUTION ORAL; RESPIRATORY (INHALATION) at 08:22

## 2023-10-23 RX ADMIN — INSULIN LISPRO 4 UNITS: 100 INJECTION, SOLUTION INTRAVENOUS; SUBCUTANEOUS at 16:38

## 2023-10-23 RX ADMIN — SODIUM CHLORIDE, PRESERVATIVE FREE 10 ML: 5 INJECTION INTRAVENOUS at 10:02

## 2023-10-23 RX ADMIN — GUAIFENESIN AND DEXTROMETHORPHAN 5 ML: 100; 10 SYRUP ORAL at 01:00

## 2023-10-23 RX ADMIN — ACETAMINOPHEN 650 MG: 325 TABLET ORAL at 04:43

## 2023-10-23 RX ADMIN — PIPERACILLIN AND TAZOBACTAM 3375 MG: 3; .375 INJECTION, POWDER, LYOPHILIZED, FOR SOLUTION INTRAVENOUS at 12:00

## 2023-10-23 RX ADMIN — APIXABAN 5 MG: 5 TABLET, FILM COATED ORAL at 09:45

## 2023-10-23 RX ADMIN — BUDESONIDE 500 MCG: 0.5 INHALANT RESPIRATORY (INHALATION) at 20:22

## 2023-10-23 RX ADMIN — INSULIN GLARGINE 40 UNITS: 100 INJECTION, SOLUTION SUBCUTANEOUS at 09:41

## 2023-10-23 RX ADMIN — METHOCARBAMOL TABLETS 750 MG: 750 TABLET, COATED ORAL at 00:56

## 2023-10-23 RX ADMIN — Medication: at 09:49

## 2023-10-23 RX ADMIN — ACETYLCYSTEINE 600 MG: 200 SOLUTION ORAL; RESPIRATORY (INHALATION) at 20:22

## 2023-10-23 RX ADMIN — METOPROLOL TARTRATE 25 MG: 25 TABLET, FILM COATED ORAL at 21:40

## 2023-10-23 RX ADMIN — FUROSEMIDE 20 MG: 10 INJECTION, SOLUTION INTRAMUSCULAR; INTRAVENOUS at 18:04

## 2023-10-23 RX ADMIN — ANASTROZOLE 1 MG: 1 TABLET, COATED ORAL at 11:56

## 2023-10-23 RX ADMIN — PIPERACILLIN AND TAZOBACTAM 3375 MG: 3; .375 INJECTION, POWDER, LYOPHILIZED, FOR SOLUTION INTRAVENOUS at 22:34

## 2023-10-23 RX ADMIN — BUDESONIDE 500 MCG: 0.5 INHALANT RESPIRATORY (INHALATION) at 08:27

## 2023-10-23 RX ADMIN — METHOCARBAMOL TABLETS 750 MG: 750 TABLET, COATED ORAL at 12:33

## 2023-10-23 RX ADMIN — PIPERACILLIN AND TAZOBACTAM 3375 MG: 3; .375 INJECTION, POWDER, LYOPHILIZED, FOR SOLUTION INTRAVENOUS at 04:44

## 2023-10-23 RX ADMIN — GUAIFENESIN 1200 MG: 600 TABLET, EXTENDED RELEASE ORAL at 09:43

## 2023-10-23 RX ADMIN — CYCLOBENZAPRINE 5 MG: 10 TABLET, FILM COATED ORAL at 16:04

## 2023-10-23 RX ADMIN — Medication: at 21:49

## 2023-10-23 RX ADMIN — IPRATROPIUM BROMIDE AND ALBUTEROL SULFATE 1 DOSE: 2.5; .5 SOLUTION RESPIRATORY (INHALATION) at 08:22

## 2023-10-23 RX ADMIN — PREDNISONE 20 MG: 20 TABLET ORAL at 09:44

## 2023-10-23 ASSESSMENT — PAIN DESCRIPTION - ORIENTATION
ORIENTATION: RIGHT;MID
ORIENTATION: RIGHT;MID
ORIENTATION: RIGHT

## 2023-10-23 ASSESSMENT — PAIN DESCRIPTION - LOCATION
LOCATION: BACK;LEG
LOCATION: BACK;HIP;LEG;BUTTOCKS
LOCATION: BACK;LEG;BUTTOCKS

## 2023-10-23 ASSESSMENT — PAIN DESCRIPTION - DESCRIPTORS
DESCRIPTORS: ACHING;THROBBING
DESCRIPTORS: ACHING;SORE
DESCRIPTORS: ACHING

## 2023-10-23 ASSESSMENT — PAIN SCALES - GENERAL
PAINLEVEL_OUTOF10: 6
PAINLEVEL_OUTOF10: 7
PAINLEVEL_OUTOF10: 4
PAINLEVEL_OUTOF10: 6
PAINLEVEL_OUTOF10: 7
PAINLEVEL_OUTOF10: 4

## 2023-10-23 ASSESSMENT — ENCOUNTER SYMPTOMS
GASTROINTESTINAL NEGATIVE: 1
RESPIRATORY NEGATIVE: 1

## 2023-10-23 NOTE — WOUND CARE
Wound care nurse consult for sacral/buttocks wound  Chart reviewed and patient assessed    49 y/o AAF incontinent of urine and stool. Wearing adult briefs has a partial thickness wound caused by moisture and friction=skin tear and not pressure. Staff have applied Venelex ointment to wound and surrounding skin. Will continue with Venelex/BPO protocol for treatment. Sacrum/buttocks: BID, apply Venelex/BPO ointment to clean dry skin. Cleanse gently with soap and water when soiled.     [address], RN, CWON

## 2023-10-24 PROBLEM — Z91.89 AT RISK FOR CONSTIPATION: Status: RESOLVED | Noted: 2023-09-29 | Resolved: 2023-10-24

## 2023-10-24 PROBLEM — J96.22 ACUTE ON CHRONIC RESPIRATORY FAILURE WITH HYPOXIA AND HYPERCAPNIA (HCC): Status: RESOLVED | Noted: 2018-01-30 | Resolved: 2023-10-24

## 2023-10-24 PROBLEM — B87.9 MAGGOT INFESTATION: Status: RESOLVED | Noted: 2017-07-25 | Resolved: 2023-10-24

## 2023-10-24 PROBLEM — E66.01 MORBID OBESITY (HCC): Status: RESOLVED | Noted: 2023-08-24 | Resolved: 2023-10-24

## 2023-10-24 PROBLEM — J96.92 HYPERCAPNIC RESPIRATORY FAILURE (HCC): Status: RESOLVED | Noted: 2019-12-06 | Resolved: 2023-10-24

## 2023-10-24 PROBLEM — C79.51 CARCINOMA OF RIGHT BREAST METASTATIC TO BONE (HCC): Status: RESOLVED | Noted: 2023-08-21 | Resolved: 2023-10-24

## 2023-10-24 PROBLEM — Z71.89 COUNSELING REGARDING GOALS OF CARE: Status: RESOLVED | Noted: 2018-08-23 | Resolved: 2023-10-24

## 2023-10-24 PROBLEM — J96.21 ACUTE ON CHRONIC RESPIRATORY FAILURE WITH HYPOXIA AND HYPERCAPNIA (HCC): Status: RESOLVED | Noted: 2018-01-30 | Resolved: 2023-10-24

## 2023-10-24 PROBLEM — R45.89 NEED FOR EMOTIONAL SUPPORT: Status: RESOLVED | Noted: 2023-08-24 | Resolved: 2023-10-24

## 2023-10-24 PROBLEM — R06.02 SHORTNESS OF BREATH: Status: RESOLVED | Noted: 2023-09-29 | Resolved: 2023-10-24

## 2023-10-24 PROBLEM — G89.3 CANCER ASSOCIATED PAIN: Status: RESOLVED | Noted: 2023-08-21 | Resolved: 2023-10-24

## 2023-10-24 PROBLEM — R62.7 FAILURE TO THRIVE IN ADULT: Status: RESOLVED | Noted: 2023-08-21 | Resolved: 2023-10-24

## 2023-10-24 PROBLEM — K59.00 CONSTIPATION: Status: RESOLVED | Noted: 2023-08-21 | Resolved: 2023-10-24

## 2023-10-24 PROBLEM — R53.1 GENERALIZED WEAKNESS: Status: RESOLVED | Noted: 2023-09-29 | Resolved: 2023-10-24

## 2023-10-24 PROBLEM — C79.9 METASTATIC MALIGNANT NEOPLASM (HCC): Status: RESOLVED | Noted: 2023-08-21 | Resolved: 2023-10-24

## 2023-10-24 PROBLEM — J96.00 ACUTE RESPIRATORY FAILURE (HCC): Status: RESOLVED | Noted: 2018-01-22 | Resolved: 2023-10-24

## 2023-10-24 PROBLEM — M54.9 BACK PAIN: Status: RESOLVED | Noted: 2023-08-21 | Resolved: 2023-10-24

## 2023-10-24 PROBLEM — Z71.89 GOALS OF CARE, COUNSELING/DISCUSSION: Status: RESOLVED | Noted: 2023-09-29 | Resolved: 2023-10-24

## 2023-10-24 PROBLEM — J96.90 RESPIRATORY FAILURE (HCC): Status: RESOLVED | Noted: 2018-08-19 | Resolved: 2023-10-24

## 2023-10-24 PROBLEM — R05.8 PRODUCTIVE COUGH: Status: RESOLVED | Noted: 2018-10-22 | Resolved: 2023-10-24

## 2023-10-24 PROBLEM — Z51.5 PALLIATIVE CARE BY SPECIALIST: Status: RESOLVED | Noted: 2023-08-21 | Resolved: 2023-10-24

## 2023-10-24 PROBLEM — E83.42 HYPOMAGNESEMIA: Status: ACTIVE | Noted: 2023-10-24

## 2023-10-24 PROBLEM — J18.9 MULTIFOCAL PNEUMONIA: Status: RESOLVED | Noted: 2017-09-27 | Resolved: 2023-10-24

## 2023-10-24 PROBLEM — C50.911 CARCINOMA OF RIGHT BREAST METASTATIC TO BONE (HCC): Status: RESOLVED | Noted: 2023-08-21 | Resolved: 2023-10-24

## 2023-10-24 PROBLEM — M48.00 SPINAL STENOSIS: Status: RESOLVED | Noted: 2023-08-21 | Resolved: 2023-10-24

## 2023-10-24 LAB
ANION GAP SERPL CALC-SCNC: 3 MMOL/L (ref 5–15)
BASOPHILS # BLD: 0.1 K/UL (ref 0–0.1)
BASOPHILS NFR BLD: 1 % (ref 0–1)
BUN SERPL-MCNC: 27 MG/DL (ref 6–20)
BUN/CREAT SERPL: 27 (ref 12–20)
CALCIUM SERPL-MCNC: 9.8 MG/DL (ref 8.5–10.1)
CHLORIDE SERPL-SCNC: 102 MMOL/L (ref 97–108)
CO2 SERPL-SCNC: 34 MMOL/L (ref 21–32)
CREAT SERPL-MCNC: 1.01 MG/DL (ref 0.55–1.02)
DIFFERENTIAL METHOD BLD: ABNORMAL
EOSINOPHIL # BLD: 0.1 K/UL (ref 0–0.4)
EOSINOPHIL NFR BLD: 1 % (ref 0–7)
ERYTHROCYTE [DISTWIDTH] IN BLOOD BY AUTOMATED COUNT: 13 % (ref 11.5–14.5)
GLUCOSE BLD STRIP.AUTO-MCNC: 139 MG/DL (ref 65–117)
GLUCOSE BLD STRIP.AUTO-MCNC: 208 MG/DL (ref 65–117)
GLUCOSE BLD STRIP.AUTO-MCNC: 273 MG/DL (ref 65–117)
GLUCOSE BLD STRIP.AUTO-MCNC: 334 MG/DL (ref 65–117)
GLUCOSE SERPL-MCNC: 190 MG/DL (ref 65–100)
HCT VFR BLD AUTO: 37.5 % (ref 35–47)
HGB BLD-MCNC: 11.5 G/DL (ref 11.5–16)
IMM GRANULOCYTES # BLD AUTO: 0.1 K/UL (ref 0–0.04)
IMM GRANULOCYTES NFR BLD AUTO: 1 % (ref 0–0.5)
LYMPHOCYTES # BLD: 1 K/UL (ref 0.8–3.5)
LYMPHOCYTES NFR BLD: 9 % (ref 12–49)
MCH RBC QN AUTO: 29.9 PG (ref 26–34)
MCHC RBC AUTO-ENTMCNC: 30.7 G/DL (ref 30–36.5)
MCV RBC AUTO: 97.7 FL (ref 80–99)
MONOCYTES # BLD: 1 K/UL (ref 0–1)
MONOCYTES NFR BLD: 9 % (ref 5–13)
NEUTS SEG # BLD: 8.3 K/UL (ref 1.8–8)
NEUTS SEG NFR BLD: 79 % (ref 32–75)
NRBC # BLD: 0 K/UL (ref 0–0.01)
NRBC BLD-RTO: 0 PER 100 WBC
PLATELET # BLD AUTO: 182 K/UL (ref 150–400)
PMV BLD AUTO: 10.4 FL (ref 8.9–12.9)
POTASSIUM SERPL-SCNC: 3.8 MMOL/L (ref 3.5–5.1)
RBC # BLD AUTO: 3.84 M/UL (ref 3.8–5.2)
SERVICE CMNT-IMP: ABNORMAL
SODIUM SERPL-SCNC: 139 MMOL/L (ref 136–145)
WBC # BLD AUTO: 10.5 K/UL (ref 3.6–11)

## 2023-10-24 PROCEDURE — 6360000002 HC RX W HCPCS: Performed by: STUDENT IN AN ORGANIZED HEALTH CARE EDUCATION/TRAINING PROGRAM

## 2023-10-24 PROCEDURE — 6370000000 HC RX 637 (ALT 250 FOR IP): Performed by: INTERNAL MEDICINE

## 2023-10-24 PROCEDURE — 36415 COLL VENOUS BLD VENIPUNCTURE: CPT

## 2023-10-24 PROCEDURE — 6360000002 HC RX W HCPCS: Performed by: NURSE PRACTITIONER

## 2023-10-24 PROCEDURE — 6370000000 HC RX 637 (ALT 250 FOR IP): Performed by: NURSE PRACTITIONER

## 2023-10-24 PROCEDURE — 85025 COMPLETE CBC W/AUTO DIFF WBC: CPT

## 2023-10-24 PROCEDURE — 2580000003 HC RX 258: Performed by: INTERNAL MEDICINE

## 2023-10-24 PROCEDURE — 94761 N-INVAS EAR/PLS OXIMETRY MLT: CPT

## 2023-10-24 PROCEDURE — 6370000000 HC RX 637 (ALT 250 FOR IP): Performed by: STUDENT IN AN ORGANIZED HEALTH CARE EDUCATION/TRAINING PROGRAM

## 2023-10-24 PROCEDURE — 80048 BASIC METABOLIC PNL TOTAL CA: CPT

## 2023-10-24 PROCEDURE — 2580000003 HC RX 258: Performed by: STUDENT IN AN ORGANIZED HEALTH CARE EDUCATION/TRAINING PROGRAM

## 2023-10-24 PROCEDURE — 2700000000 HC OXYGEN THERAPY PER DAY

## 2023-10-24 PROCEDURE — 1100000000 HC RM PRIVATE

## 2023-10-24 PROCEDURE — 82962 GLUCOSE BLOOD TEST: CPT

## 2023-10-24 PROCEDURE — 94640 AIRWAY INHALATION TREATMENT: CPT

## 2023-10-24 RX ORDER — HYDROMORPHONE HYDROCHLORIDE 2 MG/1
2 TABLET ORAL
Status: DISCONTINUED | OUTPATIENT
Start: 2023-10-24 | End: 2023-10-25 | Stop reason: HOSPADM

## 2023-10-24 RX ORDER — POLYETHYLENE GLYCOL 3350 17 G/17G
17 POWDER, FOR SOLUTION ORAL DAILY
Qty: 30 PACKET | Refills: 0 | Status: SHIPPED | OUTPATIENT
Start: 2023-10-25 | End: 2023-11-24

## 2023-10-24 RX ORDER — SENNA AND DOCUSATE SODIUM 50; 8.6 MG/1; MG/1
2 TABLET, FILM COATED ORAL DAILY
Qty: 60 TABLET | Refills: 0 | Status: SHIPPED | OUTPATIENT
Start: 2023-10-25

## 2023-10-24 RX ADMIN — HYDROMORPHONE HYDROCHLORIDE 2 MG: 2 TABLET ORAL at 09:45

## 2023-10-24 RX ADMIN — ACETAMINOPHEN 650 MG: 325 TABLET ORAL at 04:47

## 2023-10-24 RX ADMIN — APIXABAN 5 MG: 5 TABLET, FILM COATED ORAL at 09:44

## 2023-10-24 RX ADMIN — INSULIN GLARGINE 40 UNITS: 100 INJECTION, SOLUTION SUBCUTANEOUS at 09:42

## 2023-10-24 RX ADMIN — CYCLOBENZAPRINE 5 MG: 10 TABLET, FILM COATED ORAL at 16:47

## 2023-10-24 RX ADMIN — IPRATROPIUM BROMIDE AND ALBUTEROL SULFATE 1 DOSE: 2.5; .5 SOLUTION RESPIRATORY (INHALATION) at 08:45

## 2023-10-24 RX ADMIN — IPRATROPIUM BROMIDE AND ALBUTEROL SULFATE 1 DOSE: 2.5; .5 SOLUTION RESPIRATORY (INHALATION) at 14:46

## 2023-10-24 RX ADMIN — Medication: at 21:03

## 2023-10-24 RX ADMIN — METOPROLOL TARTRATE 25 MG: 25 TABLET, FILM COATED ORAL at 21:07

## 2023-10-24 RX ADMIN — ASPIRIN 81 MG: 81 TABLET, CHEWABLE ORAL at 09:45

## 2023-10-24 RX ADMIN — ACETAMINOPHEN 650 MG: 325 TABLET ORAL at 19:00

## 2023-10-24 RX ADMIN — HYDROMORPHONE HYDROCHLORIDE 2 MG: 2 TABLET ORAL at 16:48

## 2023-10-24 RX ADMIN — ANASTROZOLE 1 MG: 1 TABLET, COATED ORAL at 09:40

## 2023-10-24 RX ADMIN — PIPERACILLIN AND TAZOBACTAM 3375 MG: 3; .375 INJECTION, POWDER, LYOPHILIZED, FOR SOLUTION INTRAVENOUS at 04:48

## 2023-10-24 RX ADMIN — BUDESONIDE 500 MCG: 0.5 INHALANT RESPIRATORY (INHALATION) at 21:24

## 2023-10-24 RX ADMIN — GUAIFENESIN 1200 MG: 600 TABLET, EXTENDED RELEASE ORAL at 21:06

## 2023-10-24 RX ADMIN — METHOCARBAMOL TABLETS 750 MG: 750 TABLET, COATED ORAL at 01:59

## 2023-10-24 RX ADMIN — ATORVASTATIN CALCIUM 20 MG: 20 TABLET, FILM COATED ORAL at 21:04

## 2023-10-24 RX ADMIN — SODIUM CHLORIDE, PRESERVATIVE FREE 10 ML: 5 INJECTION INTRAVENOUS at 09:48

## 2023-10-24 RX ADMIN — MICONAZOLE NITRATE: 2 POWDER TOPICAL at 21:03

## 2023-10-24 RX ADMIN — IPRATROPIUM BROMIDE AND ALBUTEROL SULFATE 1 DOSE: 2.5; .5 SOLUTION RESPIRATORY (INHALATION) at 12:09

## 2023-10-24 RX ADMIN — APIXABAN 5 MG: 5 TABLET, FILM COATED ORAL at 21:04

## 2023-10-24 RX ADMIN — GUAIFENESIN AND DEXTROMETHORPHAN 5 ML: 100; 10 SYRUP ORAL at 13:19

## 2023-10-24 RX ADMIN — CYCLOBENZAPRINE 5 MG: 10 TABLET, FILM COATED ORAL at 21:10

## 2023-10-24 RX ADMIN — SENNOSIDES AND DOCUSATE SODIUM 2 TABLET: 50; 8.6 TABLET ORAL at 09:41

## 2023-10-24 RX ADMIN — Medication: at 09:47

## 2023-10-24 RX ADMIN — ACETYLCYSTEINE 600 MG: 200 SOLUTION ORAL; RESPIRATORY (INHALATION) at 08:45

## 2023-10-24 RX ADMIN — INSULIN LISPRO 4 UNITS: 100 INJECTION, SOLUTION INTRAVENOUS; SUBCUTANEOUS at 16:46

## 2023-10-24 RX ADMIN — METHOCARBAMOL TABLETS 750 MG: 750 TABLET, COATED ORAL at 09:44

## 2023-10-24 RX ADMIN — SODIUM CHLORIDE, PRESERVATIVE FREE 10 ML: 5 INJECTION INTRAVENOUS at 21:06

## 2023-10-24 RX ADMIN — BUDESONIDE 500 MCG: 0.5 INHALANT RESPIRATORY (INHALATION) at 08:50

## 2023-10-24 RX ADMIN — CYCLOBENZAPRINE 5 MG: 10 TABLET, FILM COATED ORAL at 13:12

## 2023-10-24 RX ADMIN — FUROSEMIDE 20 MG: 10 INJECTION, SOLUTION INTRAMUSCULAR; INTRAVENOUS at 16:47

## 2023-10-24 RX ADMIN — Medication: at 09:46

## 2023-10-24 RX ADMIN — INSULIN LISPRO 8 UNITS: 100 INJECTION, SOLUTION INTRAVENOUS; SUBCUTANEOUS at 07:12

## 2023-10-24 RX ADMIN — IPRATROPIUM BROMIDE AND ALBUTEROL SULFATE 1 DOSE: 2.5; .5 SOLUTION RESPIRATORY (INHALATION) at 21:24

## 2023-10-24 RX ADMIN — PANTOPRAZOLE SODIUM 40 MG: 40 TABLET, DELAYED RELEASE ORAL at 06:55

## 2023-10-24 RX ADMIN — METHOCARBAMOL TABLETS 750 MG: 750 TABLET, COATED ORAL at 13:11

## 2023-10-24 RX ADMIN — INSULIN LISPRO 4 UNITS: 100 INJECTION, SOLUTION INTRAVENOUS; SUBCUTANEOUS at 21:26

## 2023-10-24 RX ADMIN — MICONAZOLE NITRATE: 2 POWDER TOPICAL at 09:48

## 2023-10-24 RX ADMIN — GUAIFENESIN 1200 MG: 600 TABLET, EXTENDED RELEASE ORAL at 09:43

## 2023-10-24 RX ADMIN — ACETYLCYSTEINE 600 MG: 200 SOLUTION ORAL; RESPIRATORY (INHALATION) at 21:24

## 2023-10-24 RX ADMIN — METOPROLOL TARTRATE 25 MG: 25 TABLET, FILM COATED ORAL at 09:45

## 2023-10-24 RX ADMIN — PREDNISONE 20 MG: 20 TABLET ORAL at 09:45

## 2023-10-24 RX ADMIN — PIPERACILLIN AND TAZOBACTAM 3375 MG: 3; .375 INJECTION, POWDER, LYOPHILIZED, FOR SOLUTION INTRAVENOUS at 17:54

## 2023-10-24 RX ADMIN — FUROSEMIDE 20 MG: 10 INJECTION, SOLUTION INTRAMUSCULAR; INTRAVENOUS at 09:43

## 2023-10-24 ASSESSMENT — PAIN DESCRIPTION - ORIENTATION
ORIENTATION: RIGHT;MID
ORIENTATION: RIGHT
ORIENTATION: LEFT;RIGHT

## 2023-10-24 ASSESSMENT — PAIN SCALES - GENERAL
PAINLEVEL_OUTOF10: 4
PAINLEVEL_OUTOF10: 7
PAINLEVEL_OUTOF10: 4
PAINLEVEL_OUTOF10: 7

## 2023-10-24 ASSESSMENT — PAIN SCALES - WONG BAKER: WONGBAKER_NUMERICALRESPONSE: 0

## 2023-10-24 ASSESSMENT — PAIN DESCRIPTION - DESCRIPTORS
DESCRIPTORS: ACHING
DESCRIPTORS: ACHING

## 2023-10-24 ASSESSMENT — PAIN DESCRIPTION - LOCATION
LOCATION: BACK;HIP;LEG
LOCATION: BACK;FLANK;HIP;LEG

## 2023-10-25 VITALS
HEART RATE: 82 BPM | WEIGHT: 293 LBS | RESPIRATION RATE: 24 BRPM | HEIGHT: 66 IN | OXYGEN SATURATION: 98 % | SYSTOLIC BLOOD PRESSURE: 111 MMHG | BODY MASS INDEX: 47.09 KG/M2 | DIASTOLIC BLOOD PRESSURE: 73 MMHG | TEMPERATURE: 97.7 F

## 2023-10-25 LAB
ANION GAP SERPL CALC-SCNC: 4 MMOL/L (ref 5–15)
BASOPHILS # BLD: 0 K/UL (ref 0–0.1)
BASOPHILS NFR BLD: 0 % (ref 0–1)
BUN SERPL-MCNC: 20 MG/DL (ref 6–20)
BUN/CREAT SERPL: 26 (ref 12–20)
CALCIUM SERPL-MCNC: 10.1 MG/DL (ref 8.5–10.1)
CHLORIDE SERPL-SCNC: 101 MMOL/L (ref 97–108)
CO2 SERPL-SCNC: 33 MMOL/L (ref 21–32)
CREAT SERPL-MCNC: 0.77 MG/DL (ref 0.55–1.02)
DIFFERENTIAL METHOD BLD: ABNORMAL
EOSINOPHIL # BLD: 0.1 K/UL (ref 0–0.4)
EOSINOPHIL NFR BLD: 1 % (ref 0–7)
ERYTHROCYTE [DISTWIDTH] IN BLOOD BY AUTOMATED COUNT: 12.9 % (ref 11.5–14.5)
GLUCOSE BLD STRIP.AUTO-MCNC: 142 MG/DL (ref 65–117)
GLUCOSE BLD STRIP.AUTO-MCNC: 192 MG/DL (ref 65–117)
GLUCOSE BLD STRIP.AUTO-MCNC: 299 MG/DL (ref 65–117)
GLUCOSE SERPL-MCNC: 156 MG/DL (ref 65–100)
HCT VFR BLD AUTO: 37.2 % (ref 35–47)
HGB BLD-MCNC: 11.6 G/DL (ref 11.5–16)
IMM GRANULOCYTES # BLD AUTO: 0.1 K/UL (ref 0–0.04)
IMM GRANULOCYTES NFR BLD AUTO: 1 % (ref 0–0.5)
LYMPHOCYTES # BLD: 1 K/UL (ref 0.8–3.5)
LYMPHOCYTES NFR BLD: 9 % (ref 12–49)
MCH RBC QN AUTO: 29.7 PG (ref 26–34)
MCHC RBC AUTO-ENTMCNC: 31.2 G/DL (ref 30–36.5)
MCV RBC AUTO: 95.4 FL (ref 80–99)
MONOCYTES # BLD: 0.9 K/UL (ref 0–1)
MONOCYTES NFR BLD: 8 % (ref 5–13)
NEUTS SEG # BLD: 8.8 K/UL (ref 1.8–8)
NEUTS SEG NFR BLD: 81 % (ref 32–75)
NRBC # BLD: 0 K/UL (ref 0–0.01)
NRBC BLD-RTO: 0 PER 100 WBC
PLATELET # BLD AUTO: 173 K/UL (ref 150–400)
PMV BLD AUTO: 10.4 FL (ref 8.9–12.9)
POTASSIUM SERPL-SCNC: 4 MMOL/L (ref 3.5–5.1)
RBC # BLD AUTO: 3.9 M/UL (ref 3.8–5.2)
SERVICE CMNT-IMP: ABNORMAL
SODIUM SERPL-SCNC: 138 MMOL/L (ref 136–145)
WBC # BLD AUTO: 10.9 K/UL (ref 3.6–11)

## 2023-10-25 PROCEDURE — 6370000000 HC RX 637 (ALT 250 FOR IP): Performed by: NURSE PRACTITIONER

## 2023-10-25 PROCEDURE — 82962 GLUCOSE BLOOD TEST: CPT

## 2023-10-25 PROCEDURE — 94669 MECHANICAL CHEST WALL OSCILL: CPT

## 2023-10-25 PROCEDURE — 36415 COLL VENOUS BLD VENIPUNCTURE: CPT

## 2023-10-25 PROCEDURE — 6370000000 HC RX 637 (ALT 250 FOR IP): Performed by: STUDENT IN AN ORGANIZED HEALTH CARE EDUCATION/TRAINING PROGRAM

## 2023-10-25 PROCEDURE — 6360000002 HC RX W HCPCS: Performed by: NURSE PRACTITIONER

## 2023-10-25 PROCEDURE — 80048 BASIC METABOLIC PNL TOTAL CA: CPT

## 2023-10-25 PROCEDURE — 6370000000 HC RX 637 (ALT 250 FOR IP): Performed by: INTERNAL MEDICINE

## 2023-10-25 PROCEDURE — 85025 COMPLETE CBC W/AUTO DIFF WBC: CPT

## 2023-10-25 PROCEDURE — 94640 AIRWAY INHALATION TREATMENT: CPT

## 2023-10-25 PROCEDURE — 2580000003 HC RX 258: Performed by: INTERNAL MEDICINE

## 2023-10-25 PROCEDURE — 2700000000 HC OXYGEN THERAPY PER DAY

## 2023-10-25 PROCEDURE — 94761 N-INVAS EAR/PLS OXIMETRY MLT: CPT

## 2023-10-25 RX ORDER — FUROSEMIDE 40 MG/1
20 TABLET ORAL ONCE
Status: DISCONTINUED | OUTPATIENT
Start: 2023-10-25 | End: 2023-10-25 | Stop reason: HOSPADM

## 2023-10-25 RX ORDER — HYDROMORPHONE HYDROCHLORIDE 2 MG/1
2 TABLET ORAL EVERY 4 HOURS PRN
Qty: 30 TABLET | Refills: 0 | Status: SHIPPED | OUTPATIENT
Start: 2023-10-25 | End: 2023-11-01

## 2023-10-25 RX ADMIN — IPRATROPIUM BROMIDE AND ALBUTEROL SULFATE 1 DOSE: 2.5; .5 SOLUTION RESPIRATORY (INHALATION) at 12:29

## 2023-10-25 RX ADMIN — GUAIFENESIN 1200 MG: 600 TABLET, EXTENDED RELEASE ORAL at 10:26

## 2023-10-25 RX ADMIN — APIXABAN 5 MG: 5 TABLET, FILM COATED ORAL at 10:32

## 2023-10-25 RX ADMIN — CYCLOBENZAPRINE 5 MG: 10 TABLET, FILM COATED ORAL at 10:25

## 2023-10-25 RX ADMIN — BUDESONIDE 500 MCG: 0.5 INHALANT RESPIRATORY (INHALATION) at 08:56

## 2023-10-25 RX ADMIN — METOPROLOL TARTRATE 25 MG: 25 TABLET, FILM COATED ORAL at 10:25

## 2023-10-25 RX ADMIN — IPRATROPIUM BROMIDE AND ALBUTEROL SULFATE 1 DOSE: 2.5; .5 SOLUTION RESPIRATORY (INHALATION) at 08:51

## 2023-10-25 RX ADMIN — IPRATROPIUM BROMIDE AND ALBUTEROL SULFATE 1 DOSE: 2.5; .5 SOLUTION RESPIRATORY (INHALATION) at 16:22

## 2023-10-25 RX ADMIN — FUROSEMIDE 20 MG: 10 INJECTION, SOLUTION INTRAMUSCULAR; INTRAVENOUS at 10:22

## 2023-10-25 RX ADMIN — Medication: at 14:48

## 2023-10-25 RX ADMIN — METHOCARBAMOL TABLETS 750 MG: 750 TABLET, COATED ORAL at 12:12

## 2023-10-25 RX ADMIN — INSULIN LISPRO 8 UNITS: 100 INJECTION, SOLUTION INTRAVENOUS; SUBCUTANEOUS at 16:48

## 2023-10-25 RX ADMIN — SODIUM CHLORIDE, PRESERVATIVE FREE 10 ML: 5 INJECTION INTRAVENOUS at 10:23

## 2023-10-25 RX ADMIN — METHOCARBAMOL TABLETS 750 MG: 750 TABLET, COATED ORAL at 00:49

## 2023-10-25 RX ADMIN — CYCLOBENZAPRINE 5 MG: 10 TABLET, FILM COATED ORAL at 16:50

## 2023-10-25 RX ADMIN — ACETAMINOPHEN 650 MG: 325 TABLET ORAL at 16:50

## 2023-10-25 RX ADMIN — METHOCARBAMOL TABLETS 750 MG: 750 TABLET, COATED ORAL at 10:27

## 2023-10-25 RX ADMIN — HYDROMORPHONE HYDROCHLORIDE 2 MG: 2 TABLET ORAL at 14:36

## 2023-10-25 RX ADMIN — ANASTROZOLE 1 MG: 1 TABLET, COATED ORAL at 10:27

## 2023-10-25 RX ADMIN — INSULIN GLARGINE 40 UNITS: 100 INJECTION, SOLUTION SUBCUTANEOUS at 10:24

## 2023-10-25 RX ADMIN — ACETYLCYSTEINE 600 MG: 200 SOLUTION ORAL; RESPIRATORY (INHALATION) at 08:51

## 2023-10-25 RX ADMIN — ACETAMINOPHEN 650 MG: 325 TABLET ORAL at 06:39

## 2023-10-25 RX ADMIN — ASPIRIN 81 MG: 81 TABLET, CHEWABLE ORAL at 10:26

## 2023-10-25 RX ADMIN — SENNOSIDES AND DOCUSATE SODIUM 2 TABLET: 50; 8.6 TABLET ORAL at 10:26

## 2023-10-25 RX ADMIN — PREDNISONE 20 MG: 20 TABLET ORAL at 10:25

## 2023-10-25 RX ADMIN — HYDROMORPHONE HYDROCHLORIDE 2 MG: 2 TABLET ORAL at 03:17

## 2023-10-25 RX ADMIN — PANTOPRAZOLE SODIUM 40 MG: 40 TABLET, DELAYED RELEASE ORAL at 06:40

## 2023-10-25 ASSESSMENT — PAIN SCALES - GENERAL
PAINLEVEL_OUTOF10: 6
PAINLEVEL_OUTOF10: 7
PAINLEVEL_OUTOF10: 4

## 2023-10-25 ASSESSMENT — PAIN DESCRIPTION - ORIENTATION
ORIENTATION: RIGHT;MID
ORIENTATION: RIGHT

## 2023-10-25 ASSESSMENT — PAIN DESCRIPTION - DESCRIPTORS
DESCRIPTORS: ACHING
DESCRIPTORS: ACHING;THROBBING
DESCRIPTORS: THROBBING;DULL

## 2023-10-25 ASSESSMENT — PAIN DESCRIPTION - LOCATION
LOCATION: LEG;BACK;CHEST
LOCATION: HIP;BUTTOCKS;LEG
LOCATION: BACK;FLANK;HIP;LEG

## 2023-10-25 NOTE — CARE COORDINATION
CM Note: went to see patient an gather information. She was not in room. Javon Murray she was going to first xrt tx today. Will follow up tomorrow. Will need to assess if she will stay her for next 4 xrt tx or will she be going back home.   Mabel Gee  [facility]
CM received notice from [address] [place] [address] that medical records have been reviewed and [address] [place] [address] has made decitions to end services in hospital.  Patient will be responsible for the cost of all services continued at [facility] on 10/26/2023.       Heather Ambrose, RN, BSN, [facility]  Manager of Case Management  [phone]
Medicare discharge appeal received from [facility]. Case control # [id number]. All requested notes, DND, and IMM letter was sent electronically to [facility]. Patient  given a copy of the DND and a copy was placed on patient's chart.  Will continue to monitor the status of the appeal.    Heather Ambrose RN, BSN, [employer]  Manager of Case Management  [phone]
Transition of Care Plan:    RUR: 24%  Prior Level of Functioning: Need assistance with ADLs   Disposition: Home with family support   Follow up appointments: Follow up with PCP and/or Specialist  DME needed: have DME at home   Transportation at discharge: BLS transport   IM/IMM Medicare/ letter given: 2nd IM Medicare Letter to be given   Is patient a  and connected with VA? N/A   If yes, was [employer] transfer form completed and VA notified? N/A  Caregiver Contact: Destiny Boyce (son) [phone]  Discharge Caregiver contacted prior to discharge? Family to be contacted   Care Conference needed? Not at this time  Barriers to discharge: Medical Stable    CM informed that pt will remain as inpatient greater than 48hrs. Pt will return home with [address] and family support.     CJ Berger   [phone]
Transition of Care Plan:    RUR: 24%  Prior Level of Functioning: Need assistance with ADLs   Disposition: Home with family support   Follow up appointments: Follow up with PCP and/or Specialist  DME needed: have DME at home   Transportation at discharge: BLS transport   IM/IMM Medicare/ letter given: 2nd IM Medicare Letter to be given   Is patient a  and connected with VA? N/A   If yes, was [employer] transfer form completed and VA notified? N/A  Caregiver Contact: Lanny Butterfield (son) [phone]  Discharge Caregiver contacted prior to discharge? Family to be contacted   Care Conference needed? Not at this time  Barriers to discharge: Medical Stable      CM staffed case with clinical team.  Pt will remain as inpatient for 24-48hrs. Pt will return back home with family support and C at the time of d/c. Pt will require BLS transport at the time of d/c. CM will continue to follow.     CJ Meza   [phone]
Transition of Care Plan:    RUR: 25%  Prior Level of Functioning: Needs assistance with ADLs   Disposition: Home with family support and [address] [facility]   Follow up appointments: Follow up with PCP and/or Specialist   DME needed: pt has DME and Home O2 at home   Transportation at discharge: BLS transport   IM/IMM Medicare/ letter given: 2nd IM Medicare Letter   Is patient a  and connected with VA? N/A   If yes, was [place] transfer form completed and VA notified? N/A  Caregiver Contact: Tiffany Page (child) [phone]  Discharge Caregiver contacted prior to discharge? Family to be contacted   Care Conference needed? Not at this time   Barriers to discharge: Medical Stable      UPDATE: 12:43PM    CM informed that pts appeal was denied. CM completed room visit with pt to inform her of the following. Pt was known to be extremely angry when informed of decision. Pt agreed for transport to be arranged today at [facility]. CM will arrange with [facility]. CM will set up [address] [address] [address] [facility] services with [facility] (pt previously serviced connected). CJ Schmitz Antes  [phone]        UPDATE: 11:33AM    CM completed room visit with pt, to verify d/c needs and plans. CM reviewed placement options with pt. Pt reported that she will transition home once appeal has been processed and she doesn't want to go to LTC. CM will inform CM Supervisor of the following. CJ Schmitz Antes  [phone]        INITIAL NOTE: OMAR informed that pts appeal is currently pending with Ruby Leiva     CM attempted to complete room visit with pt, to verify d/c needs in plans. CM wanted to review placement for pt, if she doesn't feel comfortable with returning home. However, pt is currently resting. CM attempted to wake pt. CM will revisit room at a later time.     CJ Schmitz   [phone]
Transition of Care Plan:    RUR: 25%  Prior Level of Functioning: Needs assistance with ADLs   Disposition: Home with family support and [address] [facility]   Follow up appointments: Follow up with PCP and/or Specialist   DME needed: pt has DME and Home O2 at home   Transportation at discharge: BLS transport   IM/IMM Medicare/ letter given: 2nd IM Medicare Letter   Is patient a  and connected with VA? N/A   If yes, was [place] transfer form completed and VA notified? N/A  Caregiver Contact: Alondra Patel (child) [phone]  Discharge Caregiver contacted prior to discharge? Family to be contacted   Care Conference needed? Not at this time   Barriers to discharge: Medical Stable    UPDATE: 12:58PM    CM made aware that pt wants to appeal her d/c on today. CM provided pt with 2nd IM Medicare Letter. CM instructed pt to contact Count includes the [facility] to initiate appeal.  CM provided signed 2nd IM Medicare Letter to [address] [place] [address]. CM will continue to follow. CJ Sutherland [facility]  [phone]            INITIAL NOTE: OMAR aware that pt will d/c on today and will transition home with [address] [facility] and family support. CM completed room visit with pt, to inform her of todays d/c and pt stated that she is currently in pain and will like to speak with clinical team.  OMAR will inform physician of the following.     CJ Sutherland CM  [phone]
hospital, before you returned this time?: No (Rapid readmission)  Did you see a specialist, such as Cardiac, Pulmonary, Orthopedic Physician, etc. after you left the hospital?: No  Who advised the patient to return to the hospital?: Physician ([address] [facility] provider)  In our efforts to provide the best possible care to you and others like you, can you think of anything that we could have done to help you after you left the hospital the first time, so that you might not have needed to return so soon?: Arrange for more help when leaving the hospital, Additional Community resources available for illness support    CM spoke to pt and completed readmission assessment. Pt lives with her nephew and son in a 1 level home with a ramp entrance at the front. Pt reports needing assistance with some ADL's at home. She reports that she gets help with ADL's from her son and nephew. Pt has SNF experience with  [address] and was discharged last week. CM informed pt that she could utilize assistance from her medicaid [facility] for care coordination and assist her to obtain personal care. Pt said she will look in to that option. Pt also reported that she is receiving her radiology while she is at hospital and she has 4 more course in next 4 days .     Pharmacy- [facility] pharmacy on [address], [place]    Thomas CORONA    Ext [phone]

## 2023-10-25 NOTE — DISCHARGE SUMMARY
Admit date: 10/5/2023   Admitting Provider: Alexandrea Barber MD    Discharge date: 10/24/2023  Discharging Provider: LINDA Sheets      * Admission Diagnoses:     Hypomagnesemia [E83.42]  Chronic pain syndrome [G89.4]  Tachycardia [R00.0]  Acute and chronic respiratory failure with hypoxia (HCC) [J96.21]  Carcinoma of breast metastatic to multiple sites, unspecified laterality ([address]) [C50.919]  Acute on chronic respiratory failure ([address]) [J96.20]    * Discharge Diagnoses:      Principal Problem:    Acute on chronic respiratory failure ([address])  Active Problems:    Controlled diabetes mellitus type 2 with complications ([address])    Pulmonary embolism ([address])    Counseling regarding advance care planning and goals of care    Tachycardia    Chronic pain syndrome    Carcinoma of breast metastatic to multiple sites [facility])    Acute and chronic respiratory failure with hypoxia (HCC)    Recurrent bacteremia    Coagulase negative Staphylococcus bacteremia    Aspiration pneumonia of both upper lobes due to gastric secretions ([address])    Full code status    Palliative care encounter    Pain    Hypomagnesemia  Resolved Problems:    * No resolved hospital problems. [facility] Course:     Doyle Gupta is a 29-year-old female with a PMH of metastatic breast cancer, COPD not on O2, MO, and CHF who presented with shortness of breath. In ED hypoxic. Initial labs significant for glucose of 203, calcium of 11.3, and lactic of 2.12. EKG with sinus tachycardia. CXR who subtotal left hemithorax opacification. CTA of the chest showed RUL PE extending into segmental and subsegmental branches appears slightly decreased in bulk compared to prior with new left upper lobe and lingular bronchus occlusions with postobstructive volume loss. Patient admitted for further management. Patient started on cefepime, vancomycin, and Flagyl. Pulmonology, oncology, and Palliative Care consulted. Blood cultures grew SCoagN. MRSA screen negative.
medications      polyethylene glycol 17 g packet  Commonly known as: GLYCOLAX  Take 1 packet by mouth daily     sennosides-docusate sodium 8.6-50 MG tablet  Commonly known as: SENOKOT-S  Take 2 tablets by mouth daily            CHANGE how you take these medications      albuterol (2.5 MG/3ML) 0.083% nebulizer solution  Commonly known as: PROVENTIL  Take 3 mLs by nebulization every 6 hours as needed for Wheezing  What changed:   when to take this  Another medication with the same name was removed. Continue taking this medication, and follow the directions you see here. apixaban 5 MG Tabs tablet  Commonly known as: ELIQUIS  Take 1 tablet by mouth 2 times daily  What changed: Another medication with the same name was removed. Continue taking this medication, and follow the directions you see here. CONTINUE taking these medications      acetaminophen 500 MG tablet  Commonly known as: TYLENOL     anastrozole 1 MG tablet  Commonly known as: ARIMIDEX  Take 1 tablet by mouth daily     aspirin 81 MG chewable tablet     fluticasone 50 MCG/ACT nasal spray  Commonly known as: FLONASE     furosemide 40 MG tablet  Commonly known as: LASIX     glyBURIDE 5 MG tablet  Commonly known as: DIABETA     HYDROmorphone 2 MG tablet  Commonly known as: DILAUDID  Take 1 tablet by mouth every 4 hours as needed for Pain for up to 7 days.  Max Daily Amount: 12 mg     insulin glargine 100 UNIT/ML injection vial  Commonly known as: LANTUS     ipratropium 0.5 mg-albuterol 2.5 mg 0.5-2.5 (3) MG/3ML Soln nebulizer solution  Commonly known as: DUONEB     lidocaine 4 % external patch     loratadine 10 MG tablet  Commonly known as: CLARITIN     lovastatin 40 MG tablet  Commonly known as: MEVACOR     methocarbamol 750 MG tablet  Commonly known as: ROBAXIN     metoprolol tartrate 25 MG tablet  Commonly known as: LOPRESSOR     nitroGLYCERIN 0.4 MG SL tablet  Commonly known as: NITROSTAT     NovoLOG FlexPen 100 UNIT/ML injection pen  Generic

## 2023-10-25 NOTE — PALLIATIVE CARE DISCHARGE
Goals of Care/Treatment Preferences    The Palliative Medicine team was consulted as part of your/your loved one's care in the hospital. Our team is a supportive service; we strive to relieve suffering and improve quality of life. We reviewed advance care planning information, which includes the following:    Primary Decision Maker: Prabhu Guthrie) - Other - [phone]    Secondary Decision Maker: Tiffany Page - Child - [phone]       We reviewed / discussed your code status as:   Code Status: DNR       [facility] means do NOT perform CPR in the event of cardiac arrest.        Other Instructions: Please work with Medicaid to enlist Medicaid Personal Care agency to assist you at home in addition to [facility] agency. Wishing you and your family the best.     Because of the importance of this information, we are providing you with a printed copy to share with other healthcare providers after this hospitalization is complete. Thank you, Ms. Jomar Varela, for including Palliative team in your care here at [facility].     Marychuy Petersen, [address] [facility]  Palliative Medicine [phone]

## 2023-10-30 ENCOUNTER — HOSPITAL ENCOUNTER (INPATIENT)
Facility: HOSPITAL | Age: 53
LOS: 4 days | Discharge: HOME | End: 2023-11-03
Attending: EMERGENCY MEDICINE | Admitting: INTERNAL MEDICINE
Payer: MEDICARE

## 2023-10-30 ENCOUNTER — APPOINTMENT (OUTPATIENT)
Facility: HOSPITAL | Age: 53
End: 2023-10-30
Payer: MEDICARE

## 2023-10-30 DIAGNOSIS — J18.9 COMMUNITY ACQUIRED PNEUMONIA OF LEFT LUNG, UNSPECIFIED PART OF LUNG: Primary | ICD-10-CM

## 2023-10-30 DIAGNOSIS — C79.51 CARCINOMA OF RIGHT BREAST METASTATIC TO BONE (HCC): ICD-10-CM

## 2023-10-30 DIAGNOSIS — J96.21 ACUTE ON CHRONIC RESPIRATORY FAILURE WITH HYPOXIA (HCC): ICD-10-CM

## 2023-10-30 DIAGNOSIS — C50.911 CARCINOMA OF RIGHT BREAST METASTATIC TO BONE (HCC): ICD-10-CM

## 2023-10-30 LAB
ALBUMIN SERPL-MCNC: 3.1 G/DL (ref 3.5–5)
ALBUMIN/GLOB SERPL: 0.8 (ref 1.1–2.2)
ALP SERPL-CCNC: 247 U/L (ref 45–117)
ALT SERPL-CCNC: 39 U/L (ref 12–78)
ANION GAP SERPL CALC-SCNC: 4 MMOL/L (ref 5–15)
AST SERPL-CCNC: 21 U/L (ref 15–37)
BASOPHILS # BLD: 0.1 K/UL (ref 0–0.1)
BASOPHILS NFR BLD: 1 % (ref 0–1)
BILIRUB SERPL-MCNC: 0.4 MG/DL (ref 0.2–1)
BUN SERPL-MCNC: 20 MG/DL (ref 6–20)
BUN/CREAT SERPL: 30 (ref 12–20)
CALCIUM SERPL-MCNC: 10.5 MG/DL (ref 8.5–10.1)
CHLORIDE SERPL-SCNC: 110 MMOL/L (ref 97–108)
CO2 SERPL-SCNC: 29 MMOL/L (ref 21–32)
CREAT SERPL-MCNC: 0.67 MG/DL (ref 0.55–1.02)
DIFFERENTIAL METHOD BLD: ABNORMAL
EOSINOPHIL # BLD: 0.1 K/UL (ref 0–0.4)
EOSINOPHIL NFR BLD: 1 % (ref 0–7)
ERYTHROCYTE [DISTWIDTH] IN BLOOD BY AUTOMATED COUNT: 13.5 % (ref 11.5–14.5)
GLOBULIN SER CALC-MCNC: 3.7 G/DL (ref 2–4)
GLUCOSE BLD STRIP.AUTO-MCNC: 228 MG/DL (ref 65–117)
GLUCOSE SERPL-MCNC: 217 MG/DL (ref 65–100)
HCT VFR BLD AUTO: 41.4 % (ref 35–47)
HGB BLD-MCNC: 12.6 G/DL (ref 11.5–16)
IMM GRANULOCYTES # BLD AUTO: 0.1 K/UL (ref 0–0.04)
IMM GRANULOCYTES NFR BLD AUTO: 1 % (ref 0–0.5)
LYMPHOCYTES # BLD: 0.6 K/UL (ref 0.8–3.5)
LYMPHOCYTES NFR BLD: 6 % (ref 12–49)
MAGNESIUM SERPL-MCNC: 1.9 MG/DL (ref 1.6–2.4)
MCH RBC QN AUTO: 29.4 PG (ref 26–34)
MCHC RBC AUTO-ENTMCNC: 30.4 G/DL (ref 30–36.5)
MCV RBC AUTO: 96.7 FL (ref 80–99)
MONOCYTES # BLD: 0.4 K/UL (ref 0–1)
MONOCYTES NFR BLD: 4 % (ref 5–13)
NEUTS SEG # BLD: 8.6 K/UL (ref 1.8–8)
NEUTS SEG NFR BLD: 87 % (ref 32–75)
NRBC # BLD: 0 K/UL (ref 0–0.01)
NRBC BLD-RTO: 0 PER 100 WBC
NT PRO BNP: 239 PG/ML
PLATELET # BLD AUTO: 167 K/UL (ref 150–400)
PMV BLD AUTO: 10.2 FL (ref 8.9–12.9)
POTASSIUM SERPL-SCNC: 4 MMOL/L (ref 3.5–5.1)
PROT SERPL-MCNC: 6.8 G/DL (ref 6.4–8.2)
RBC # BLD AUTO: 4.28 M/UL (ref 3.8–5.2)
RBC MORPH BLD: ABNORMAL
SERVICE CMNT-IMP: ABNORMAL
SODIUM SERPL-SCNC: 143 MMOL/L (ref 136–145)
TROPONIN I SERPL HS-MCNC: 24 NG/L (ref 0–51)
WBC # BLD AUTO: 9.9 K/UL (ref 3.6–11)

## 2023-10-30 PROCEDURE — 99285 EMERGENCY DEPT VISIT HI MDM: CPT

## 2023-10-30 PROCEDURE — 6370000000 HC RX 637 (ALT 250 FOR IP): Performed by: EMERGENCY MEDICINE

## 2023-10-30 PROCEDURE — 82962 GLUCOSE BLOOD TEST: CPT

## 2023-10-30 PROCEDURE — 6360000002 HC RX W HCPCS: Performed by: EMERGENCY MEDICINE

## 2023-10-30 PROCEDURE — 36415 COLL VENOUS BLD VENIPUNCTURE: CPT

## 2023-10-30 PROCEDURE — 84484 ASSAY OF TROPONIN QUANT: CPT

## 2023-10-30 PROCEDURE — 2580000003 HC RX 258: Performed by: INTERNAL MEDICINE

## 2023-10-30 PROCEDURE — 6360000002 HC RX W HCPCS: Performed by: INTERNAL MEDICINE

## 2023-10-30 PROCEDURE — 6370000000 HC RX 637 (ALT 250 FOR IP): Performed by: INTERNAL MEDICINE

## 2023-10-30 PROCEDURE — 71045 X-RAY EXAM CHEST 1 VIEW: CPT

## 2023-10-30 PROCEDURE — 94640 AIRWAY INHALATION TREATMENT: CPT

## 2023-10-30 PROCEDURE — 80053 COMPREHEN METABOLIC PANEL: CPT

## 2023-10-30 PROCEDURE — 93005 ELECTROCARDIOGRAM TRACING: CPT | Performed by: EMERGENCY MEDICINE

## 2023-10-30 PROCEDURE — 85025 COMPLETE CBC W/AUTO DIFF WBC: CPT

## 2023-10-30 PROCEDURE — 83735 ASSAY OF MAGNESIUM: CPT

## 2023-10-30 PROCEDURE — 83880 ASSAY OF NATRIURETIC PEPTIDE: CPT

## 2023-10-30 PROCEDURE — 1100000003 HC PRIVATE W/ TELEMETRY

## 2023-10-30 RX ORDER — FUROSEMIDE 40 MG/1
40 TABLET ORAL DAILY
Status: DISCONTINUED | OUTPATIENT
Start: 2023-10-30 | End: 2023-11-04 | Stop reason: HOSPADM

## 2023-10-30 RX ORDER — PREDNISONE 20 MG/1
40 TABLET ORAL DAILY
Status: COMPLETED | OUTPATIENT
Start: 2023-10-30 | End: 2023-11-03

## 2023-10-30 RX ORDER — ATORVASTATIN CALCIUM 20 MG/1
20 TABLET, FILM COATED ORAL NIGHTLY
Status: DISCONTINUED | OUTPATIENT
Start: 2023-10-30 | End: 2023-11-04 | Stop reason: HOSPADM

## 2023-10-30 RX ORDER — INSULIN GLARGINE 100 [IU]/ML
40 INJECTION, SOLUTION SUBCUTANEOUS DAILY
Status: DISCONTINUED | OUTPATIENT
Start: 2023-10-31 | End: 2023-11-04 | Stop reason: HOSPADM

## 2023-10-30 RX ORDER — ACETAMINOPHEN 650 MG/1
650 SUPPOSITORY RECTAL EVERY 6 HOURS PRN
Status: DISCONTINUED | OUTPATIENT
Start: 2023-10-30 | End: 2023-11-04 | Stop reason: HOSPADM

## 2023-10-30 RX ORDER — INSULIN GLARGINE 100 [IU]/ML
40 INJECTION, SOLUTION SUBCUTANEOUS NIGHTLY
Status: DISCONTINUED | OUTPATIENT
Start: 2023-10-31 | End: 2023-10-30

## 2023-10-30 RX ORDER — GUAIFENESIN 600 MG/1
600 TABLET, EXTENDED RELEASE ORAL 2 TIMES DAILY
Status: DISCONTINUED | OUTPATIENT
Start: 2023-10-30 | End: 2023-11-04 | Stop reason: HOSPADM

## 2023-10-30 RX ORDER — SODIUM CHLORIDE 9 MG/ML
INJECTION, SOLUTION INTRAVENOUS PRN
Status: DISCONTINUED | OUTPATIENT
Start: 2023-10-30 | End: 2023-11-04 | Stop reason: HOSPADM

## 2023-10-30 RX ORDER — INSULIN LISPRO 100 [IU]/ML
0-8 INJECTION, SOLUTION INTRAVENOUS; SUBCUTANEOUS
Status: DISCONTINUED | OUTPATIENT
Start: 2023-10-30 | End: 2023-11-04 | Stop reason: HOSPADM

## 2023-10-30 RX ORDER — POLYETHYLENE GLYCOL 3350 17 G/17G
17 POWDER, FOR SOLUTION ORAL DAILY PRN
Status: DISCONTINUED | OUTPATIENT
Start: 2023-10-30 | End: 2023-11-04 | Stop reason: HOSPADM

## 2023-10-30 RX ORDER — IPRATROPIUM BROMIDE AND ALBUTEROL SULFATE 2.5; .5 MG/3ML; MG/3ML
1 SOLUTION RESPIRATORY (INHALATION) EVERY 4 HOURS PRN
Status: DISCONTINUED | OUTPATIENT
Start: 2023-10-30 | End: 2023-11-04 | Stop reason: HOSPADM

## 2023-10-30 RX ORDER — IPRATROPIUM BROMIDE AND ALBUTEROL SULFATE 2.5; .5 MG/3ML; MG/3ML
1 SOLUTION RESPIRATORY (INHALATION)
Status: COMPLETED | OUTPATIENT
Start: 2023-10-30 | End: 2023-10-30

## 2023-10-30 RX ORDER — SENNA AND DOCUSATE SODIUM 50; 8.6 MG/1; MG/1
2 TABLET, FILM COATED ORAL 2 TIMES DAILY
Status: DISCONTINUED | OUTPATIENT
Start: 2023-10-30 | End: 2023-11-04 | Stop reason: HOSPADM

## 2023-10-30 RX ORDER — ANASTROZOLE 1 MG/1
1 TABLET ORAL DAILY
Status: DISCONTINUED | OUTPATIENT
Start: 2023-10-30 | End: 2023-11-04 | Stop reason: HOSPADM

## 2023-10-30 RX ORDER — ONDANSETRON 4 MG/1
4 TABLET, ORALLY DISINTEGRATING ORAL EVERY 8 HOURS PRN
Status: DISCONTINUED | OUTPATIENT
Start: 2023-10-30 | End: 2023-11-04 | Stop reason: HOSPADM

## 2023-10-30 RX ORDER — METHOCARBAMOL 500 MG/1
750 TABLET, FILM COATED ORAL EVERY 6 HOURS PRN
Status: DISCONTINUED | OUTPATIENT
Start: 2023-10-30 | End: 2023-11-04 | Stop reason: HOSPADM

## 2023-10-30 RX ORDER — ALBUTEROL SULFATE 2.5 MG/3ML
2.5 SOLUTION RESPIRATORY (INHALATION)
Status: COMPLETED | OUTPATIENT
Start: 2023-10-30 | End: 2023-10-30

## 2023-10-30 RX ORDER — INSULIN LISPRO 100 [IU]/ML
0-4 INJECTION, SOLUTION INTRAVENOUS; SUBCUTANEOUS NIGHTLY
Status: DISCONTINUED | OUTPATIENT
Start: 2023-10-30 | End: 2023-11-04 | Stop reason: HOSPADM

## 2023-10-30 RX ORDER — LIDOCAINE 4 G/G
1 PATCH TOPICAL EVERY 24 HOURS
Status: DISCONTINUED | OUTPATIENT
Start: 2023-10-30 | End: 2023-11-04 | Stop reason: HOSPADM

## 2023-10-30 RX ORDER — ONDANSETRON 2 MG/ML
4 INJECTION INTRAMUSCULAR; INTRAVENOUS EVERY 6 HOURS PRN
Status: DISCONTINUED | OUTPATIENT
Start: 2023-10-30 | End: 2023-11-04 | Stop reason: HOSPADM

## 2023-10-30 RX ORDER — SODIUM CHLORIDE 0.9 % (FLUSH) 0.9 %
5-40 SYRINGE (ML) INJECTION PRN
Status: DISCONTINUED | OUTPATIENT
Start: 2023-10-30 | End: 2023-11-04 | Stop reason: HOSPADM

## 2023-10-30 RX ORDER — SODIUM CHLORIDE 0.9 % (FLUSH) 0.9 %
5-40 SYRINGE (ML) INJECTION EVERY 12 HOURS SCHEDULED
Status: DISCONTINUED | OUTPATIENT
Start: 2023-10-30 | End: 2023-11-04 | Stop reason: HOSPADM

## 2023-10-30 RX ORDER — ACETAMINOPHEN 325 MG/1
650 TABLET ORAL EVERY 6 HOURS PRN
Status: DISCONTINUED | OUTPATIENT
Start: 2023-10-30 | End: 2023-11-04 | Stop reason: HOSPADM

## 2023-10-30 RX ORDER — HYDROMORPHONE HYDROCHLORIDE 2 MG/1
2 TABLET ORAL EVERY 4 HOURS PRN
Status: DISCONTINUED | OUTPATIENT
Start: 2023-10-30 | End: 2023-11-04 | Stop reason: HOSPADM

## 2023-10-30 RX ORDER — ASPIRIN 81 MG/1
81 TABLET, CHEWABLE ORAL DAILY
Status: DISCONTINUED | OUTPATIENT
Start: 2023-10-30 | End: 2023-11-04 | Stop reason: HOSPADM

## 2023-10-30 RX ADMIN — IPRATROPIUM BROMIDE AND ALBUTEROL SULFATE 1 DOSE: .5; 3 SOLUTION RESPIRATORY (INHALATION) at 19:00

## 2023-10-30 RX ADMIN — APIXABAN 5 MG: 5 TABLET, FILM COATED ORAL at 20:22

## 2023-10-30 RX ADMIN — METOPROLOL TARTRATE 25 MG: 25 TABLET, FILM COATED ORAL at 20:22

## 2023-10-30 RX ADMIN — SODIUM CHLORIDE, PRESERVATIVE FREE 10 ML: 5 INJECTION INTRAVENOUS at 20:33

## 2023-10-30 RX ADMIN — FUROSEMIDE 40 MG: 40 TABLET ORAL at 20:32

## 2023-10-30 RX ADMIN — ACETAMINOPHEN 650 MG: 325 TABLET ORAL at 20:23

## 2023-10-30 RX ADMIN — SODIUM CHLORIDE, PRESERVATIVE FREE 10 ML: 5 INJECTION INTRAVENOUS at 20:26

## 2023-10-30 RX ADMIN — PREDNISONE 40 MG: 20 TABLET ORAL at 20:32

## 2023-10-30 RX ADMIN — ALBUTEROL SULFATE 2.5 MG: 2.5 SOLUTION RESPIRATORY (INHALATION) at 19:00

## 2023-10-30 RX ADMIN — ATORVASTATIN CALCIUM 20 MG: 20 TABLET, FILM COATED ORAL at 20:25

## 2023-10-30 RX ADMIN — GUAIFENESIN 600 MG: 600 TABLET, EXTENDED RELEASE ORAL at 20:23

## 2023-10-30 RX ADMIN — HYDROMORPHONE HYDROCHLORIDE 2 MG: 2 TABLET ORAL at 22:08

## 2023-10-30 RX ADMIN — SODIUM CHLORIDE 1000 MG: 900 INJECTION INTRAVENOUS at 20:23

## 2023-10-30 RX ADMIN — AZITHROMYCIN MONOHYDRATE 500 MG: 500 INJECTION, POWDER, LYOPHILIZED, FOR SOLUTION INTRAVENOUS at 20:24

## 2023-10-30 ASSESSMENT — PAIN DESCRIPTION - LOCATION
LOCATION: BACK
LOCATION: BUTTOCKS

## 2023-10-30 ASSESSMENT — PAIN SCALES - GENERAL
PAINLEVEL_OUTOF10: 6

## 2023-10-30 ASSESSMENT — PAIN - FUNCTIONAL ASSESSMENT
PAIN_FUNCTIONAL_ASSESSMENT: PREVENTS OR INTERFERES SOME ACTIVE ACTIVITIES AND ADLS
PAIN_FUNCTIONAL_ASSESSMENT: PREVENTS OR INTERFERES WITH ALL ACTIVE AND SOME PASSIVE ACTIVITIES

## 2023-10-30 ASSESSMENT — PAIN DESCRIPTION - ORIENTATION
ORIENTATION: RIGHT
ORIENTATION: RIGHT

## 2023-10-30 ASSESSMENT — PAIN DESCRIPTION - DESCRIPTORS: DESCRIPTORS: ACHING

## 2023-10-30 ASSESSMENT — PAIN DESCRIPTION - FREQUENCY: FREQUENCY: CONTINUOUS

## 2023-10-30 ASSESSMENT — PAIN DESCRIPTION - PAIN TYPE: TYPE: CHRONIC PAIN

## 2023-10-30 NOTE — ED NOTES
Bedside shift change report given to Kamlesh Murillo (oncoming nurse) by Gonzalo Castillo and Eagle Rojas  (offgoing nurse). Report included the following information Nurse Handoff Report, ED Encounter Summary, ED SBAR, Adult Overview, MAR, Recent Results, and Neuro Assessment.        Elizabeth Almanza  10/30/23 1921

## 2023-10-30 NOTE — ED PROVIDER NOTES
[facility] EMERGENCY DEPT  EMERGENCY DEPARTMENT ENCOUNTER       Pt Name: Ling Harmon  MRN: [id number]  [address] [facility] 1970  Date of evaluation: 10/30/2023  Provider: Luis Stovall MD   PCP: Gutierrez Weathers MD  Note Started: 7:36 PM EDT 10/30/23     CHIEF COMPLAINT       Chief Complaint   Patient presents with    Shortness of Breath    Wheezing     Pt arrives by rescue from home for congestion, wheezing and shortness of breath. Pt is on Home O2 at 5lpm with sats in the 80s. Pt is diaphoretic, audible wheezing         HISTORY OF PRESENT ILLNESS: 1 or more elements      History From: Patient and medical records EMS, History limited by: none     Ling Harmon is a 48 y.o. female patient with a history of asthma, coronary artery disease, morbid obesity, SVT, breast cancer, COPD on 5 L of oxygen normally, here for increased shortness of breath. Patient was admitted on September 28 for pulmonary emboli. She was discharged on October 2, and readmitted  October 5 for tachycardia and acute on chronic respiratory failure. She had a second CTA of the chest at that time, which revealed slightly decreased pulmonary emboli, postobstructive bronchial occlusions left upper lobe, osseous metastatic disease which had been previously demonstrated and right breast masses. She was discharged 5 days ago. She did not  her Eliquis, because her pharmacist told her she could have internal bleeding. She has no prior history of GI bleed. She developed worsening shortness of breath in the last 2 days with a cough productive of beige and yellow sputum. She denies fever, worsening of leg pain or edema. She has had occasional chest pain but is occurring with coughing. EMS states that her saturation was 88% on 5 L so they increased her to 6 L. Please See MDM for Additional Details of the HPI/PMH  Nursing Notes were all reviewed and agreed with or any disagreements were addressed in the HPI.      REVIEW OF SYSTEMS escaped final proofreading.)        Janae Galvan MD  10/30/23 7825

## 2023-10-30 NOTE — ED NOTES
Pt O2 between 86-88 on 5L. Notified MD. MD instructed to place pt on 6L and call RT for neb treatment.       Rambo Fuentes  10/30/23 1912

## 2023-10-30 NOTE — H&P
Hospitalist Admission Note    NAME: Maik Isaac   :  1970   MRN:  [id number]     Date/Time:  10/30/2023 7:36 PM    Patient PCP: Eloina Willett MD  ______________________________________________________________________  Given the patient's current clinical presentation, I have a high level of concern for decompensation if discharged from the emergency department. Given the patient's current clinical presentation, I have a high level of concern for decompensation if patient is discharged from the emergency department. Patient will be admitted as an inpatient with an estimated LOS of 2 days    My assessment of this patient's clinical condition and my plan of care is as follows. Assessment / Plan:    Acute on chronic respiratory failure, baseline 5 LPM NC  COPD exacerbation  Pneumonia  -Chest x-ray Left basilar airspace disease/effusion  -start Rocephin and zithromax  -prednisone 40mg daily x 5   -Restart Trelegy inhaler  -Nebs as needed  -Follow-up on blood cultures  -Check procalcitonin    Recent pulmonary embolism, 2023  -She is not tachycardic, not having chest discomfort and her slightly increased oxygen requirement can be explained by this pneumonia and COPD exacerbation. We will hold off on repeating a CTA as she will need to go back on full anticoagulation anyway. -Restart Eliquis. She reports the reason she did not  her Eliquis prescription was because she was not told that she had a PE the last time she was in the hospital.  The pharmacist also told her that taking Eliquis which her pain medications can increase the risk of bleeding. I explained to her that as long as she is not taking NSAIDs, it should be fine to take Eliquis with her Dilaudid and muscle relaxers.     CAD  Hx CHF, unable to specify  HTN  -Recent echo, unable to assess LVEF  -Continue aspirin and statin  -Continue Lasix and metoprolol    DM 2, on insulin  -Restart Lantus 40 units daily with tablet Take 2 tablets by mouth daily 10/25/23   McCuskey, Breck Severe, PA   apixaban (ELIQUIS) 5 MG TABS tablet Take 1 tablet by mouth 2 times daily 10/7/23   Stefan Rose MD   methocarbamol (ROBAXIN) 750 MG tablet Take 1 tablet by mouth 3 times daily    Deven Escobar MD   acetaminophen (TYLENOL) 500 MG tablet Take 2 tablets by mouth in the morning and at bedtime 0300 and 1500    Deven Escobar MD   anastrozole (ARIMIDEX) 1 MG tablet Take 1 tablet by mouth daily 8/29/23   McCuskey, Breck Severe, PA   predniSONE (DELTASONE) 20 MG tablet Take 1 tablet by mouth daily    ProviderDeven MD   albuterol (PROVENTIL) (2.5 MG/3ML) 0.083% nebulizer solution Take 3 mLs by nebulization every 6 hours as needed for Wheezing 5/31/23   Karla Daugherty MD   aspirin 81 MG chewable tablet Take by mouth daily    Automatic Reconciliation, Ar   fluticasone (FLONASE) 50 MCG/ACT nasal spray 2 sprays by Nasal route daily 1/6/19   Automatic Reconciliation, Ar   fluticasone-umeclidin-vilant (TRELEGY ELLIPTA) 100-62.5-25 MCG/ACT AEPB inhaler Inhale 1 puff into the lungs daily    Automatic Reconciliation, Ar   furosemide (LASIX) 40 MG tablet Take 1 tablet by mouth 2 times daily    Automatic Reconciliation, Ar   glyBURIDE (DIABETA) 5 MG tablet Take by mouth 2 times daily (with meals)    Automatic Reconciliation, Ar   insulin aspart (NOVOLOG FLEXPEN) 100 UNIT/ML injection pen Inject 0-6 Units into the skin 3 times daily (before meals) Sliding scale 6/11/20   Automatic Reconciliation, Ar   insulin glargine (LANTUS) 100 UNIT/ML injection vial Inject 40 Units into the skin daily    Automatic Reconciliation, Ar   ipratropium-albuterol (DUONEB) 0.5-2.5 (3) MG/3ML SOLN nebulizer solution Inhale into the lungs 4 times daily 3/3/19   Automatic Reconciliation, Ar   lidocaine 4 % external patch Place 1 patch onto the skin every 24 hours 4/11/22   Automatic Reconciliation, Ar   loratadine (CLARITIN) 10 MG tablet Take 1 tablet by

## 2023-10-31 LAB
ANION GAP SERPL CALC-SCNC: 4 MMOL/L (ref 5–15)
BASOPHILS # BLD: 0 K/UL (ref 0–0.1)
BASOPHILS NFR BLD: 0 % (ref 0–1)
BUN SERPL-MCNC: 19 MG/DL (ref 6–20)
BUN/CREAT SERPL: 28 (ref 12–20)
CALCIUM SERPL-MCNC: 10.2 MG/DL (ref 8.5–10.1)
CHLORIDE SERPL-SCNC: 108 MMOL/L (ref 97–108)
CO2 SERPL-SCNC: 28 MMOL/L (ref 21–32)
CREAT SERPL-MCNC: 0.68 MG/DL (ref 0.55–1.02)
DIFFERENTIAL METHOD BLD: ABNORMAL
EKG ATRIAL RATE: 91 BPM
EKG DIAGNOSIS: NORMAL
EKG P AXIS: 70 DEGREES
EKG P-R INTERVAL: 170 MS
EKG Q-T INTERVAL: 340 MS
EKG QRS DURATION: 84 MS
EKG QTC CALCULATION (BAZETT): 418 MS
EKG R AXIS: 97 DEGREES
EKG T AXIS: 61 DEGREES
EKG VENTRICULAR RATE: 91 BPM
EOSINOPHIL # BLD: 0 K/UL (ref 0–0.4)
EOSINOPHIL NFR BLD: 0 % (ref 0–7)
ERYTHROCYTE [DISTWIDTH] IN BLOOD BY AUTOMATED COUNT: 13.2 % (ref 11.5–14.5)
EST. AVERAGE GLUCOSE BLD GHB EST-MCNC: 137 MG/DL
GLUCOSE BLD STRIP.AUTO-MCNC: 176 MG/DL (ref 65–117)
GLUCOSE BLD STRIP.AUTO-MCNC: 176 MG/DL (ref 65–117)
GLUCOSE BLD STRIP.AUTO-MCNC: 190 MG/DL (ref 65–117)
GLUCOSE BLD STRIP.AUTO-MCNC: 238 MG/DL (ref 65–117)
GLUCOSE SERPL-MCNC: 276 MG/DL (ref 65–100)
HBA1C MFR BLD: 6.4 % (ref 4–5.6)
HCT VFR BLD AUTO: 39.3 % (ref 35–47)
HGB BLD-MCNC: 12.1 G/DL (ref 11.5–16)
IMM GRANULOCYTES # BLD AUTO: 0.1 K/UL (ref 0–0.04)
IMM GRANULOCYTES NFR BLD AUTO: 1 % (ref 0–0.5)
LYMPHOCYTES # BLD: 0.4 K/UL (ref 0.8–3.5)
LYMPHOCYTES NFR BLD: 5 % (ref 12–49)
MCH RBC QN AUTO: 29.9 PG (ref 26–34)
MCHC RBC AUTO-ENTMCNC: 30.8 G/DL (ref 30–36.5)
MCV RBC AUTO: 97 FL (ref 80–99)
MONOCYTES # BLD: 0.2 K/UL (ref 0–1)
MONOCYTES NFR BLD: 2 % (ref 5–13)
NEUTS SEG # BLD: 7.2 K/UL (ref 1.8–8)
NEUTS SEG NFR BLD: 92 % (ref 32–75)
NRBC # BLD: 0 K/UL (ref 0–0.01)
NRBC BLD-RTO: 0 PER 100 WBC
PLATELET # BLD AUTO: 158 K/UL (ref 150–400)
PMV BLD AUTO: 10.7 FL (ref 8.9–12.9)
POTASSIUM SERPL-SCNC: 4.4 MMOL/L (ref 3.5–5.1)
PROCALCITONIN SERPL-MCNC: <0.05 NG/ML
RBC # BLD AUTO: 4.05 M/UL (ref 3.8–5.2)
RBC MORPH BLD: ABNORMAL
SERVICE CMNT-IMP: ABNORMAL
SODIUM SERPL-SCNC: 140 MMOL/L (ref 136–145)
WBC # BLD AUTO: 7.9 K/UL (ref 3.6–11)

## 2023-10-31 PROCEDURE — 80048 BASIC METABOLIC PNL TOTAL CA: CPT

## 2023-10-31 PROCEDURE — 2580000003 HC RX 258: Performed by: INTERNAL MEDICINE

## 2023-10-31 PROCEDURE — 2700000000 HC OXYGEN THERAPY PER DAY

## 2023-10-31 PROCEDURE — 6360000002 HC RX W HCPCS: Performed by: INTERNAL MEDICINE

## 2023-10-31 PROCEDURE — 84145 PROCALCITONIN (PCT): CPT

## 2023-10-31 PROCEDURE — 6370000000 HC RX 637 (ALT 250 FOR IP): Performed by: INTERNAL MEDICINE

## 2023-10-31 PROCEDURE — 94640 AIRWAY INHALATION TREATMENT: CPT

## 2023-10-31 PROCEDURE — 82962 GLUCOSE BLOOD TEST: CPT

## 2023-10-31 PROCEDURE — 83036 HEMOGLOBIN GLYCOSYLATED A1C: CPT

## 2023-10-31 PROCEDURE — 36415 COLL VENOUS BLD VENIPUNCTURE: CPT

## 2023-10-31 PROCEDURE — 1100000003 HC PRIVATE W/ TELEMETRY

## 2023-10-31 PROCEDURE — 85025 COMPLETE CBC W/AUTO DIFF WBC: CPT

## 2023-10-31 RX ORDER — CASTOR OIL AND BALSAM, PERU 788; 87 MG/G; MG/G
OINTMENT TOPICAL 2 TIMES DAILY
Status: DISCONTINUED | OUTPATIENT
Start: 2023-10-31 | End: 2023-11-04 | Stop reason: HOSPADM

## 2023-10-31 RX ADMIN — SODIUM CHLORIDE, PRESERVATIVE FREE 10 ML: 5 INJECTION INTRAVENOUS at 10:06

## 2023-10-31 RX ADMIN — ATORVASTATIN CALCIUM 20 MG: 20 TABLET, FILM COATED ORAL at 23:14

## 2023-10-31 RX ADMIN — APIXABAN 5 MG: 5 TABLET, FILM COATED ORAL at 10:02

## 2023-10-31 RX ADMIN — ACETAMINOPHEN 650 MG: 325 TABLET ORAL at 04:48

## 2023-10-31 RX ADMIN — METOPROLOL TARTRATE 25 MG: 25 TABLET, FILM COATED ORAL at 23:14

## 2023-10-31 RX ADMIN — HYDROMORPHONE HYDROCHLORIDE 2 MG: 2 TABLET ORAL at 10:27

## 2023-10-31 RX ADMIN — AZITHROMYCIN MONOHYDRATE 500 MG: 500 INJECTION, POWDER, LYOPHILIZED, FOR SOLUTION INTRAVENOUS at 23:16

## 2023-10-31 RX ADMIN — METHOCARBAMOL TABLETS 750 MG: 500 TABLET, COATED ORAL at 00:39

## 2023-10-31 RX ADMIN — Medication: at 12:56

## 2023-10-31 RX ADMIN — PREDNISONE 40 MG: 20 TABLET ORAL at 10:02

## 2023-10-31 RX ADMIN — IPRATROPIUM BROMIDE AND ALBUTEROL SULFATE 1 DOSE: 2.5; .5 SOLUTION RESPIRATORY (INHALATION) at 01:27

## 2023-10-31 RX ADMIN — HYDROMORPHONE HYDROCHLORIDE 2 MG: 2 TABLET ORAL at 23:11

## 2023-10-31 RX ADMIN — ASPIRIN 81 MG: 81 TABLET, CHEWABLE ORAL at 10:02

## 2023-10-31 RX ADMIN — SODIUM CHLORIDE 1000 MG: 900 INJECTION INTRAVENOUS at 23:16

## 2023-10-31 RX ADMIN — INSULIN GLARGINE 40 UNITS: 100 INJECTION, SOLUTION SUBCUTANEOUS at 10:01

## 2023-10-31 RX ADMIN — IPRATROPIUM BROMIDE 0.5 MG: 0.5 SOLUTION RESPIRATORY (INHALATION) at 12:18

## 2023-10-31 RX ADMIN — Medication: at 23:17

## 2023-10-31 RX ADMIN — IPRATROPIUM BROMIDE 0.5 MG: 0.5 SOLUTION RESPIRATORY (INHALATION) at 16:36

## 2023-10-31 RX ADMIN — SENNOSIDES AND DOCUSATE SODIUM 2 TABLET: 50; 8.6 TABLET ORAL at 10:04

## 2023-10-31 RX ADMIN — FUROSEMIDE 40 MG: 40 TABLET ORAL at 10:02

## 2023-10-31 RX ADMIN — METOPROLOL TARTRATE 25 MG: 25 TABLET, FILM COATED ORAL at 10:02

## 2023-10-31 RX ADMIN — ARFORMOTEROL TARTRATE: 15 SOLUTION RESPIRATORY (INHALATION) at 08:18

## 2023-10-31 RX ADMIN — ACETAMINOPHEN 650 MG: 325 TABLET ORAL at 17:46

## 2023-10-31 RX ADMIN — APIXABAN 5 MG: 5 TABLET, FILM COATED ORAL at 23:11

## 2023-10-31 RX ADMIN — IPRATROPIUM BROMIDE 0.5 MG: 0.5 SOLUTION RESPIRATORY (INHALATION) at 08:23

## 2023-10-31 RX ADMIN — ANASTROZOLE 1 MG: 1 TABLET, COATED ORAL at 10:02

## 2023-10-31 RX ADMIN — GUAIFENESIN 600 MG: 600 TABLET, EXTENDED RELEASE ORAL at 10:02

## 2023-10-31 ASSESSMENT — PAIN SCALES - GENERAL
PAINLEVEL_OUTOF10: 5
PAINLEVEL_OUTOF10: 7
PAINLEVEL_OUTOF10: 3
PAINLEVEL_OUTOF10: 5

## 2023-10-31 ASSESSMENT — PAIN DESCRIPTION - ORIENTATION
ORIENTATION: LOWER
ORIENTATION: RIGHT

## 2023-10-31 ASSESSMENT — PAIN DESCRIPTION - DESCRIPTORS
DESCRIPTORS: DISCOMFORT;THROBBING
DESCRIPTORS: DISCOMFORT;THROBBING
DESCRIPTORS: DISCOMFORT
DESCRIPTORS: ACHING

## 2023-10-31 ASSESSMENT — PAIN - FUNCTIONAL ASSESSMENT: PAIN_FUNCTIONAL_ASSESSMENT: PREVENTS OR INTERFERES WITH ALL ACTIVE AND SOME PASSIVE ACTIVITIES

## 2023-10-31 ASSESSMENT — PAIN DESCRIPTION - PAIN TYPE: TYPE: CHRONIC PAIN

## 2023-10-31 ASSESSMENT — PAIN DESCRIPTION - LOCATION
LOCATION: GENERALIZED
LOCATION: BACK

## 2023-10-31 ASSESSMENT — PAIN SCALES - WONG BAKER: WONGBAKER_NUMERICALRESPONSE: 8

## 2023-10-31 NOTE — PLAN OF CARE
Problem: Safety - Adult  Goal: Free from fall injury  Outcome: Progressing     Problem: Pain  Goal: Verbalizes/displays adequate comfort level or baseline comfort level  Outcome: Progressing     Problem: Skin/Tissue Integrity  Goal: Absence of new skin breakdown  Description: 1. Monitor for areas of redness and/or skin breakdown  2. Assess vascular access sites hourly  3. Every 4-6 hours minimum:  Change oxygen saturation probe site  4. Every 4-6 hours:  If on nasal continuous positive airway pressure, respiratory therapy assess nares and determine need for appliance change or resting period.   Outcome: Progressing     Problem: Discharge Planning  Goal: Discharge to home or other facility with appropriate resources  Outcome: Not Progressing  Flowsheets (Taken 10/30/2023 2100)  Discharge to home or other facility with appropriate resources:   Identify barriers to discharge with patient and caregiver   Arrange for needed discharge resources and transportation as appropriate   Identify discharge learning needs (meds, wound care, etc)     Problem: Chronic Conditions and Co-morbidities  Goal: Patient's chronic conditions and co-morbidity symptoms are monitored and maintained or improved  Outcome: Not Progressing  Flowsheets (Taken 10/30/2023 2100)  Care Plan - Patient's Chronic Conditions and Co-Morbidity Symptoms are Monitored and Maintained or Improved:   Monitor and assess patient's chronic conditions and comorbid symptoms for stability, deterioration, or improvement   Collaborate with multidisciplinary team to address chronic and comorbid conditions and prevent exacerbation or deterioration

## 2023-10-31 NOTE — CARE COORDINATION
Care Management Initial Assessment       RUR: 26% (high RUR, readmission)  Readmission? Yes - last discharged on 10/25/2023  1st IM letter given? Yes - explanation and copy provided on 10/31/2023 but patient declined to sign until she speaks with physician. 1st  letter given: No     0915 - CM and CM Supervisor met with patient to discuss discharge planning. She confirmed she had all her medications but was not taking the eliquis. She requested to have FreeStyle Dane Strips and meter ordered. She is agreeable to having this sent to the [address] [place] [address] to have CM assist with price check. This will be brought up with team during IDRs. Patient confirmed she knows to check her own BG at home with the devices. 1941 - Patient confirmed she will be discharging to the following address: [address] [place] [address], [address] [place] [address]. She is unsure if [facility] came out to see her between her last discharge and this admission but is agreeable to CM sending out a referral to At [facility] for EMMA. Referral sent to At [facility] for [facility] via [address] with PT/OT/SN/wound care/medication management and SW.      0180 - IM letter explained and copy provided to patient; she declined to sign until speak with MD.  Patient said she did not have keys to return home and said she does not know what her son did with her keys. She said her nephew may have keys and needs to call to determine this but she will not call nephew before speaking with physician. CM let patient know that this information needs to be provided to us by the end of the day. Patient requested to speak with nurse and wanted to be a full code (patient currently listed as a full code). CM attempted to notify primary RN, unable to reach them; charge RN notified regarding patient's request.     1003 - At [facility] accepted and [facility] requested for tomorrow at 12pm to patient's home address. 1017 - [facility] arranged for 12pm tomorrow, if needed.   CM notified team From Family   ADL Assistance Needs assistance   [facility] Needs assistance   Ambulation Assistance Non-ambulatory   Transfer Assistance Needs assistance   Active  No   Patient's  Info Family provided transport   Discharge Planning   Type of Residence House   Living Arrangements Family Members   Current Services Prior To Admission Oxygen Therapy;Durable Medical Equipment   Current DME Prior to [employer]; Wheelchair;Hospital Bed;Bedside Commode;Oxygen Therapy (Comment)   Potential Assistance Needed Home Care  (EMMA with At [facility])   Potential DME Needed Hospital Bed   DME Ordered? Hospital bed   Potential Assistance Purchasing Medications No   Type of Home Care Services None   Patient expects to be discharged to: [facility]   Transition of Care Consult (CM Consult) Arnold Garber  (At [facility] (accepted))   Internal Home Health No   Reason Outside Agency Chosen Patient already serviced by other home [address] [place] [address] Street Discharge PT;OT;Nursing services   Mode of Transport at Discharge BLS  ([facility])   Confirm Follow Up Transport Family   Condition of Participation: Discharge Planning   The Plan for Transition of Care is related to the following treatment goals: [facility] - patient agreeable to resuming care with At [facility]   The Patient and/or Patient Representative was provided with a Choice of Provider? Patient   The Patient and/Or Patient Representative agree with the Discharge Plan? Yes   Freedom of Choice list was provided with basic dialogue that supports the patient's individualized plan of care/goals, treatment preferences, and shares the quality data associated with the providers?   Yes     Readmission Assessment  Number of Days since last admission?: 1-7 days  Previous Disposition: Home with Home Health  Who is being Interviewed: Patient  What was the patient's/caregiver's perception as to why they think they needed to return back to the

## 2023-10-31 NOTE — ED NOTES
Pt with stage 2 pressure wound to left buttock -est 1/2 dollar in size, with bloody and purulent drainage     Harsh Graham RN  10/30/23 2124

## 2023-10-31 NOTE — PROGRESS NOTES
Hospitalist Progress Note    NAME:   Efrain Tellez   : 1970   MRN: [id number]     Date/Time: 10/31/2023 1:33 PM  Patient PCP: Christopher Brambila MD    Estimated discharge date:   Barriers: improvement       Assessment / Plan:  Acute on chronic respiratory failure, baseline 5 LPM NC  COPD exacerbation  Pneumonia  -Chest x-ray Left basilar airspace disease/effusion  C/wRocephin and zithromax  -prednisone 40mg daily x 5   -Restart Trelegy inhaler  -Nebs as needed  -Follow-up on blood cultures  -Check procalcitonin     Recent pulmonary embolism, 2023  -Has not been taking her Eliquis, educated about importance of being compliant with her Eliquis. Resume     CAD  Hx CHF, unable to specify  HTN  -Recent echo, unable to assess LVEF  -Continue aspirin and statin  -Continue Lasix and metoprolol     DM 2, on insulin  -Restart Lantus 40 units daily with sliding scale as needed     Metastatic breast cancer POA mainly to bone  Chronic Pain syndrome due to Bony mets POA  Morbid Obesity POA  -Seen by palliative care last admission, not yet ready for hospice  -Recently completed radiation on 10/13  -Continue anastrozole  -Reorder PTA oral Dilaudid 2 mg every 3-4 hours as needed  -Robaxin as needed. Lidocaine patch to her back     Ex-smoker, quit      Medical Decision Making:  Labs reviewed by myself: CBC, BMP  Diagnostic data reviewed by myself:  CXR  Toxic drug monitoring:  Discussed case : IDR          Code Status: Full  Surrogate Decision Maker:     DVT Prophylaxis: Restarting Eliquis     Baseline: Single, lives with nephew. Does not walk much but has a rollator and wheelchair to use. Subjective:     Chief Complaint / Reason for Physician Visit  Follow-up pneumonia  Has concerns about her bed not being practical     discussed with RN events overnight. Objective:     VITALS:   Last 24hrs VS reviewed since prior progress note.  Most recent are:  Patient Vitals for the past 24 hrs:   BP

## 2023-10-31 NOTE — PROGRESS NOTES
Pt quite difficult. Demanding. When she first arrived to the room, she was not nice toward the staff. Very agitated and verbally aggressive. Immediately, boundaries were set, and a firm education on how staff is to be addressed moving forward was discussed. Pt acknowledged and has continued ot demonstrate understanding. The pt can be agitated easily and manipulative. She did not want the bed low to the ground. She said she likes it high. Explained to the pt that for safety, and it is hospital policy that the bed be lowered to the floor. She got upset and thought of every reason why she could not be lowered to the floor. One reason was she couldn't breathe down there. I raised the head of the bed to assist with that. She said her head can't be raised because it makes her back hurt. I explained to her again. She refused teaching and said she was going to raise the bed as soon as staff leaves the room. The pt's SPO2 was 95% on her baseline 02 of 5Lpm, and the controls were locked, at bed low for her safety. CLWR. Pt has pressure wound/'skin tear' on her R buttock. [place] pic taken for WC. Mepilex border applied. Consult placed. Pt encouraged to turn off onside to the other every couple of hours, and to float her heels. Pt refused pillow to help her turn or under her heels. Will continue to monitor. Pt encouraged to help staff move herself around when we clean her up. She moves around with the [address] in place and dislodges it's position. She gets SOB on exertion. RT called and informed of the pt's arrival and status and that she gets sched tx. Around 0100, the pt became SOB while moving in bed and asked for a treatment. RT called again. She came bedside within a few minutes. Treatment effective. The pt has misplaced $5.00. It seemed to have fallen out of her gown pocket. Her cards were in a baggie. Pt declined security and she said that the money misplaced was a $10 bill.  Searched around the pt and under the covers as

## 2023-11-01 LAB
GLUCOSE BLD STRIP.AUTO-MCNC: 165 MG/DL (ref 65–117)
GLUCOSE BLD STRIP.AUTO-MCNC: 218 MG/DL (ref 65–117)
GLUCOSE BLD STRIP.AUTO-MCNC: 242 MG/DL (ref 65–117)
GLUCOSE BLD STRIP.AUTO-MCNC: 92 MG/DL (ref 65–117)
SERVICE CMNT-IMP: ABNORMAL
SERVICE CMNT-IMP: NORMAL

## 2023-11-01 PROCEDURE — 2580000003 HC RX 258: Performed by: INTERNAL MEDICINE

## 2023-11-01 PROCEDURE — 6360000002 HC RX W HCPCS: Performed by: INTERNAL MEDICINE

## 2023-11-01 PROCEDURE — 82962 GLUCOSE BLOOD TEST: CPT

## 2023-11-01 PROCEDURE — 94640 AIRWAY INHALATION TREATMENT: CPT

## 2023-11-01 PROCEDURE — 1100000003 HC PRIVATE W/ TELEMETRY

## 2023-11-01 PROCEDURE — 6370000000 HC RX 637 (ALT 250 FOR IP): Performed by: INTERNAL MEDICINE

## 2023-11-01 PROCEDURE — 2700000000 HC OXYGEN THERAPY PER DAY

## 2023-11-01 RX ORDER — BLOOD-GLUCOSE METER
1 KIT MISCELLANEOUS DAILY
Qty: 1 KIT | Refills: 0 | Status: SHIPPED | OUTPATIENT
Start: 2023-11-01 | End: 2023-11-01 | Stop reason: SDUPTHER

## 2023-11-01 RX ORDER — GLUCOSAMINE HCL/CHONDROITIN SU 500-400 MG
CAPSULE ORAL
Qty: 500 STRIP | Refills: 0 | Status: SHIPPED | OUTPATIENT
Start: 2023-11-01

## 2023-11-01 RX ORDER — AMMONIUM LACTATE 12 G/100G
LOTION TOPICAL PRN
Status: DISCONTINUED | OUTPATIENT
Start: 2023-11-01 | End: 2023-11-04 | Stop reason: HOSPADM

## 2023-11-01 RX ORDER — GLUCOSAMINE HCL/CHONDROITIN SU 500-400 MG
CAPSULE ORAL
Qty: 500 STRIP | Refills: 0 | Status: SHIPPED | OUTPATIENT
Start: 2023-11-01 | End: 2023-11-01 | Stop reason: SDUPTHER

## 2023-11-01 RX ORDER — BLOOD-GLUCOSE METER
1 KIT MISCELLANEOUS DAILY
Qty: 1 KIT | Refills: 0 | Status: SHIPPED | OUTPATIENT
Start: 2023-11-01

## 2023-11-01 RX ADMIN — SODIUM CHLORIDE 1000 MG: 900 INJECTION INTRAVENOUS at 22:13

## 2023-11-01 RX ADMIN — ANASTROZOLE 1 MG: 1 TABLET, COATED ORAL at 09:37

## 2023-11-01 RX ADMIN — APIXABAN 5 MG: 5 TABLET, FILM COATED ORAL at 09:36

## 2023-11-01 RX ADMIN — INSULIN LISPRO 2 UNITS: 100 INJECTION, SOLUTION INTRAVENOUS; SUBCUTANEOUS at 16:27

## 2023-11-01 RX ADMIN — AZITHROMYCIN MONOHYDRATE 500 MG: 500 INJECTION, POWDER, LYOPHILIZED, FOR SOLUTION INTRAVENOUS at 20:49

## 2023-11-01 RX ADMIN — METHOCARBAMOL TABLETS 750 MG: 500 TABLET, COATED ORAL at 15:10

## 2023-11-01 RX ADMIN — SENNOSIDES AND DOCUSATE SODIUM 2 TABLET: 50; 8.6 TABLET ORAL at 09:36

## 2023-11-01 RX ADMIN — METOPROLOL TARTRATE 25 MG: 25 TABLET, FILM COATED ORAL at 20:47

## 2023-11-01 RX ADMIN — FUROSEMIDE 40 MG: 40 TABLET ORAL at 09:37

## 2023-11-01 RX ADMIN — METOPROLOL TARTRATE 25 MG: 25 TABLET, FILM COATED ORAL at 09:37

## 2023-11-01 RX ADMIN — SODIUM CHLORIDE, PRESERVATIVE FREE 10 ML: 5 INJECTION INTRAVENOUS at 20:48

## 2023-11-01 RX ADMIN — Medication: at 09:42

## 2023-11-01 RX ADMIN — IPRATROPIUM BROMIDE 0.5 MG: 0.5 SOLUTION RESPIRATORY (INHALATION) at 07:32

## 2023-11-01 RX ADMIN — HYDROMORPHONE HYDROCHLORIDE 2 MG: 2 TABLET ORAL at 10:28

## 2023-11-01 RX ADMIN — Medication: at 22:17

## 2023-11-01 RX ADMIN — ARFORMOTEROL TARTRATE: 15 SOLUTION RESPIRATORY (INHALATION) at 07:37

## 2023-11-01 RX ADMIN — IPRATROPIUM BROMIDE 0.5 MG: 0.5 SOLUTION RESPIRATORY (INHALATION) at 16:02

## 2023-11-01 RX ADMIN — GUAIFENESIN 600 MG: 600 TABLET, EXTENDED RELEASE ORAL at 20:47

## 2023-11-01 RX ADMIN — SODIUM CHLORIDE, PRESERVATIVE FREE 10 ML: 5 INJECTION INTRAVENOUS at 09:36

## 2023-11-01 RX ADMIN — ACETAMINOPHEN 650 MG: 325 TABLET ORAL at 06:01

## 2023-11-01 RX ADMIN — ATORVASTATIN CALCIUM 20 MG: 20 TABLET, FILM COATED ORAL at 20:47

## 2023-11-01 RX ADMIN — IPRATROPIUM BROMIDE 0.5 MG: 0.5 SOLUTION RESPIRATORY (INHALATION) at 13:14

## 2023-11-01 RX ADMIN — METHOCARBAMOL TABLETS 750 MG: 500 TABLET, COATED ORAL at 03:53

## 2023-11-01 RX ADMIN — PREDNISONE 40 MG: 20 TABLET ORAL at 09:37

## 2023-11-01 RX ADMIN — INSULIN GLARGINE 40 UNITS: 100 INJECTION, SOLUTION SUBCUTANEOUS at 09:36

## 2023-11-01 RX ADMIN — GUAIFENESIN 600 MG: 600 TABLET, EXTENDED RELEASE ORAL at 09:36

## 2023-11-01 RX ADMIN — HYDROMORPHONE HYDROCHLORIDE 2 MG: 2 TABLET ORAL at 18:53

## 2023-11-01 RX ADMIN — ASPIRIN 81 MG: 81 TABLET, CHEWABLE ORAL at 09:36

## 2023-11-01 RX ADMIN — APIXABAN 5 MG: 5 TABLET, FILM COATED ORAL at 20:47

## 2023-11-01 ASSESSMENT — PAIN SCALES - GENERAL
PAINLEVEL_OUTOF10: 7
PAINLEVEL_OUTOF10: 3
PAINLEVEL_OUTOF10: 6
PAINLEVEL_OUTOF10: 6

## 2023-11-01 ASSESSMENT — PAIN DESCRIPTION - LOCATION
LOCATION: BACK;LEG
LOCATION: BACK
LOCATION: BACK;LEG
LOCATION: BACK

## 2023-11-01 NOTE — CARE COORDINATION
5938 - CM needs for MD to send a script for Freestyle Dane strips and a meter to the [employer] at [facility] in order to complete price check due to patient saying she does not have equipment and cannot afford them at home. 1011 - MD confirmed during IDRs that strips and meter were sent for a price check. 1140 - CM contact [facility] who does not have the Freestyle Dane strips and meter right now but it can be ordered for tomorrow around 12 pm.  Pharmacist confirmed that it needs to be billed under patient's Medicare Pt. B and the pharmacy is not able to bill under Pt. B. The cost out-of-pocket is coming out to about $605.96. Prodigy is coming out to about $87.98 but this would lCM to discuss with CM Team.    476 1626 - CM discussed with CM Supervisor who suggested having strips and meter sent to patient's preferred pharmacy to perform price check. CM requested for MD to send this to patient preferred pharmacy; information sent via [email or web address]. 1617 - CM requested for [facility] to be arranged at 12pm tomorrow. CM contacted pharmacy who confirmed the strips are over $550 with FreeStyle Dane and TrueMetrics appears to be covered under patient's insurance. 1700 to 1722 - CM met with patient at bedside who confirmed she does not use arm device to check her blood sugar; instead she pricks for blood sugar checks at every meal.  She confirmed that she is able to do this at home and is agreeable to CM checking on other options. CM notified her that her pharmacy recommended sending in a prescription for True Metrics because it appears to be covered under her insurance. She is agreeable to having CM reach out to MD to send this script to her pharmacy. IM letter explained and provided to patient; hospital copy signed.   OMAR notified patient that transportation was asking for a /phone number at residence; she confirmed her nephew has keys and may be available tomorrow to let her in but

## 2023-11-01 NOTE — PROGRESS NOTES
pt refused vitals, assessment, and medication administration until 2308. Pt insisted that she get a smaller bed before she would allow this nurse to complete her tasks. Pt also threatened multiple times to fall out of bed. When this nurse put up the bed rails the patient grabbed them and shook them in an attempt to break them so that she could roll onto the floor.

## 2023-11-01 NOTE — PROGRESS NOTES
Hospitalist Progress Note    NAME:   Kalie Bunn   : 1970   MRN: [id number]     Date/Time: 2023 2:28 PM  Patient PCP: Joe Yadav MD    Estimated discharge date:   Barriers: improvement       Assessment / Plan:  Acute on chronic respiratory failure, baseline 5 LPM NC  COPD exacerbation  Pneumonia  -Chest x-ray Left basilar airspace disease/effusion  C/wRocephin and zithromax  -prednisone 40mg daily x 5   C/w  Trelegy inhaler  -Nebs as needed   blood cultures NTD    Procalcitonin<0.05     Recent pulmonary embolism, 2023  -Has not been taking her Eliquis, educated about importance of being compliant with her Eliquis. Resume     CAD  Hx CHF, unable to specify  HTN  -Recent echo, unable to assess LVEF  -Continue aspirin and statin  -Continue Lasix and metoprolol     DM 2, on insulin  -Restart Lantus 40 units daily with sliding scale as needed     Metastatic breast cancer POA mainly to bone  Chronic Pain syndrome due to Bony mets POA  Morbid Obesity POA  -Seen by palliative care last admission, not yet ready for hospice  -Recently completed radiation on 10/13  -Continue anastrozole  -Reorder PTA oral Dilaudid 2 mg every 3-4 hours as needed  -Robaxin as needed. Lidocaine patch to her back     Ex-smoker, quit      Medical Decision Making:  Labs reviewed by myself: CBC, BMP  Diagnostic data reviewed by myself:  CXR  Toxic drug monitoring:  Discussed case: IDR          Code Status: Full  Surrogate Decision Maker:     DVT Prophylaxis: Restarting Eliquis     Baseline: Single, lives with nephew. Does not walk much but has a rollator and wheelchair to use. Subjective:     Chief Complaint / Reason for Physician Visit  Follow-up pneumonia  Complaint of choking episode yesterday  Wants patient her bed to remain high     discussed with RN events overnight. Objective:     VITALS:   Last 24hrs VS reviewed since prior progress note.  Most recent are:  Patient Vitals for the

## 2023-11-01 NOTE — PROGRESS NOTES
2000:Patients nurse Huey Goldberg came to this RN about patient being not happy with patient's current bed, which is a special bariatric bed with air pump. Patient claims that, that bed is \"not a bed and it's choking her\". This RN attempts to calm patient down as patient was being very loud and aggressive, however, patient continues to scream at this RN and was verbally aggressive. This RN educated the patient that patient is on this bed due to patient having wounds and pressure injuries. Patient was not receptive and wants to speak to supervisor or she'll call the police. 2010: This RN called nursing supervisor Ramesh Avina and told Ramesh Avina about situation. Per Ramesh Avina, she'll make her way up to speak to patient once she's done with things she's currently taking care of and for us to call Code Shawanda Farr if patient continues to be aggressive. 0000: Nursing supervisor came to patients room to speak to patient, after speaking to patient, nursing supervisor called and spoke to patient's main nurse, Mague Siddiqui, and had Mague Siddiqui and other staff members assist patient into a regular [facility] bed. Patient was educated multiple times about the importance of staying on the bariatric specialty bed with air pump due to the wounds and pressure injuries that patient has. However, patient still adamant about being moved over to a regular bed otherwise patient becomes aggressive and refuses care and tries fall out of bed. This RN, Mague Siddiqui and pct Oral File used [employer] lift to move patient from specialty bed to regular [facility] bed. Patient is aware that regular [facility] bed could cause further wounds and worsening current wounds. Wound care consult placed.

## 2023-11-02 LAB
GLUCOSE BLD STRIP.AUTO-MCNC: 146 MG/DL (ref 65–117)
GLUCOSE BLD STRIP.AUTO-MCNC: 172 MG/DL (ref 65–117)
GLUCOSE BLD STRIP.AUTO-MCNC: 239 MG/DL (ref 65–117)
GLUCOSE BLD STRIP.AUTO-MCNC: 253 MG/DL (ref 65–117)
GLUCOSE BLD STRIP.AUTO-MCNC: 92 MG/DL (ref 65–117)
SERVICE CMNT-IMP: ABNORMAL
SERVICE CMNT-IMP: NORMAL

## 2023-11-02 PROCEDURE — 6360000002 HC RX W HCPCS: Performed by: INTERNAL MEDICINE

## 2023-11-02 PROCEDURE — 82962 GLUCOSE BLOOD TEST: CPT

## 2023-11-02 PROCEDURE — 94640 AIRWAY INHALATION TREATMENT: CPT

## 2023-11-02 PROCEDURE — 2700000000 HC OXYGEN THERAPY PER DAY

## 2023-11-02 PROCEDURE — 2580000003 HC RX 258: Performed by: INTERNAL MEDICINE

## 2023-11-02 PROCEDURE — 6370000000 HC RX 637 (ALT 250 FOR IP): Performed by: INTERNAL MEDICINE

## 2023-11-02 PROCEDURE — 1100000003 HC PRIVATE W/ TELEMETRY

## 2023-11-02 RX ORDER — BLOOD-GLUCOSE METER
EACH MISCELLANEOUS
Qty: 1 EACH | Refills: 0 | Status: SHIPPED | OUTPATIENT
Start: 2023-11-02

## 2023-11-02 RX ORDER — CEFDINIR 300 MG/1
300 CAPSULE ORAL 2 TIMES DAILY
Qty: 4 CAPSULE | Refills: 0 | Status: SHIPPED | OUTPATIENT
Start: 2023-11-02 | End: 2023-11-04

## 2023-11-02 RX ORDER — PREDNISONE 20 MG/1
40 TABLET ORAL DAILY
Qty: 20 TABLET | Refills: 0 | Status: SHIPPED | OUTPATIENT
Start: 2023-11-02 | End: 2023-11-12

## 2023-11-02 RX ORDER — HYDROMORPHONE HYDROCHLORIDE 2 MG/1
2 TABLET ORAL EVERY 4 HOURS PRN
Qty: 30 TABLET | Refills: 0 | Status: SHIPPED | OUTPATIENT
Start: 2023-11-02 | End: 2023-11-09

## 2023-11-02 RX ORDER — FUROSEMIDE 40 MG/1
40 TABLET ORAL DAILY
Qty: 60 TABLET | Refills: 3 | Status: SHIPPED | OUTPATIENT
Start: 2023-11-02

## 2023-11-02 RX ORDER — AZITHROMYCIN 250 MG/1
500 TABLET, FILM COATED ORAL EVERY 24 HOURS
Status: SHIPPED | OUTPATIENT
Start: 2023-11-02 | End: 2023-11-04

## 2023-11-02 RX ADMIN — IPRATROPIUM BROMIDE 0.5 MG: 0.5 SOLUTION RESPIRATORY (INHALATION) at 13:20

## 2023-11-02 RX ADMIN — ARFORMOTEROL TARTRATE: 15 SOLUTION RESPIRATORY (INHALATION) at 19:27

## 2023-11-02 RX ADMIN — ATORVASTATIN CALCIUM 20 MG: 20 TABLET, FILM COATED ORAL at 21:22

## 2023-11-02 RX ADMIN — METHOCARBAMOL TABLETS 750 MG: 500 TABLET, COATED ORAL at 23:27

## 2023-11-02 RX ADMIN — APIXABAN 5 MG: 5 TABLET, FILM COATED ORAL at 08:36

## 2023-11-02 RX ADMIN — SENNOSIDES AND DOCUSATE SODIUM 2 TABLET: 50; 8.6 TABLET ORAL at 08:36

## 2023-11-02 RX ADMIN — HYDROMORPHONE HYDROCHLORIDE 2 MG: 2 TABLET ORAL at 21:40

## 2023-11-02 RX ADMIN — IPRATROPIUM BROMIDE 0.5 MG: 0.5 SOLUTION RESPIRATORY (INHALATION) at 07:10

## 2023-11-02 RX ADMIN — SODIUM CHLORIDE, PRESERVATIVE FREE 10 ML: 5 INJECTION INTRAVENOUS at 08:37

## 2023-11-02 RX ADMIN — ARFORMOTEROL TARTRATE: 15 SOLUTION RESPIRATORY (INHALATION) at 07:15

## 2023-11-02 RX ADMIN — METOPROLOL TARTRATE 25 MG: 25 TABLET, FILM COATED ORAL at 21:22

## 2023-11-02 RX ADMIN — INSULIN LISPRO 2 UNITS: 100 INJECTION, SOLUTION INTRAVENOUS; SUBCUTANEOUS at 15:48

## 2023-11-02 RX ADMIN — PREDNISONE 40 MG: 20 TABLET ORAL at 08:36

## 2023-11-02 RX ADMIN — ANASTROZOLE 1 MG: 1 TABLET, COATED ORAL at 08:36

## 2023-11-02 RX ADMIN — ACETAMINOPHEN 650 MG: 325 TABLET ORAL at 05:42

## 2023-11-02 RX ADMIN — INSULIN GLARGINE 40 UNITS: 100 INJECTION, SOLUTION SUBCUTANEOUS at 10:57

## 2023-11-02 RX ADMIN — SODIUM CHLORIDE, PRESERVATIVE FREE 10 ML: 5 INJECTION INTRAVENOUS at 21:18

## 2023-11-02 RX ADMIN — METOPROLOL TARTRATE 25 MG: 25 TABLET, FILM COATED ORAL at 08:36

## 2023-11-02 RX ADMIN — GUAIFENESIN 600 MG: 600 TABLET, EXTENDED RELEASE ORAL at 08:36

## 2023-11-02 RX ADMIN — APIXABAN 5 MG: 5 TABLET, FILM COATED ORAL at 21:21

## 2023-11-02 RX ADMIN — ASPIRIN 81 MG: 81 TABLET, CHEWABLE ORAL at 08:35

## 2023-11-02 RX ADMIN — METHOCARBAMOL TABLETS 750 MG: 500 TABLET, COATED ORAL at 15:13

## 2023-11-02 RX ADMIN — METHOCARBAMOL TABLETS 750 MG: 500 TABLET, COATED ORAL at 00:48

## 2023-11-02 RX ADMIN — HYDROMORPHONE HYDROCHLORIDE 2 MG: 2 TABLET ORAL at 08:57

## 2023-11-02 RX ADMIN — SODIUM CHLORIDE 1000 MG: 900 INJECTION INTRAVENOUS at 20:30

## 2023-11-02 RX ADMIN — Medication: at 21:27

## 2023-11-02 RX ADMIN — IPRATROPIUM BROMIDE 0.5 MG: 0.5 SOLUTION RESPIRATORY (INHALATION) at 19:21

## 2023-11-02 RX ADMIN — GUAIFENESIN 600 MG: 600 TABLET, EXTENDED RELEASE ORAL at 21:21

## 2023-11-02 RX ADMIN — AZITHROMYCIN MONOHYDRATE 500 MG: 500 INJECTION, POWDER, LYOPHILIZED, FOR SOLUTION INTRAVENOUS at 20:50

## 2023-11-02 RX ADMIN — ACETAMINOPHEN 650 MG: 325 TABLET ORAL at 17:59

## 2023-11-02 RX ADMIN — FUROSEMIDE 40 MG: 40 TABLET ORAL at 08:36

## 2023-11-02 RX ADMIN — SENNOSIDES AND DOCUSATE SODIUM 2 TABLET: 50; 8.6 TABLET ORAL at 21:26

## 2023-11-02 RX ADMIN — Medication: at 08:37

## 2023-11-02 ASSESSMENT — PAIN SCALES - GENERAL
PAINLEVEL_OUTOF10: 4
PAINLEVEL_OUTOF10: 2
PAINLEVEL_OUTOF10: 0
PAINLEVEL_OUTOF10: 5
PAINLEVEL_OUTOF10: 7
PAINLEVEL_OUTOF10: 3
PAINLEVEL_OUTOF10: 0
PAINLEVEL_OUTOF10: 4
PAINLEVEL_OUTOF10: 8
PAINLEVEL_OUTOF10: 7
PAINLEVEL_OUTOF10: 0
PAINLEVEL_OUTOF10: 3
PAINLEVEL_OUTOF10: 5
PAINLEVEL_OUTOF10: 6
PAINLEVEL_OUTOF10: 7
PAINLEVEL_OUTOF10: 2

## 2023-11-02 ASSESSMENT — PAIN DESCRIPTION - DESCRIPTORS
DESCRIPTORS: SHARP
DESCRIPTORS: ACHING
DESCRIPTORS: ACHING
DESCRIPTORS: ACHING;SORE
DESCRIPTORS: THROBBING

## 2023-11-02 ASSESSMENT — PAIN DESCRIPTION - LOCATION
LOCATION: LEG
LOCATION: LEG
LOCATION: BACK
LOCATION: BACK
LOCATION: BACK;BUTTOCKS;LEG
LOCATION: LEG

## 2023-11-02 ASSESSMENT — PAIN DESCRIPTION - ORIENTATION
ORIENTATION: MID
ORIENTATION: RIGHT
ORIENTATION: MID
ORIENTATION: LOWER

## 2023-11-02 ASSESSMENT — PAIN SCALES - WONG BAKER
WONGBAKER_NUMERICALRESPONSE: 0

## 2023-11-02 NOTE — CARE COORDINATION
Attending: Please send a prescription for TrueMetrics strips and meter to patient's [facility] pharmacy if this system is appropriate for her. This is significantly cheaper than the FreeStyle [place] system that she previously requested. Patient is clear from a CM standpoint but she is appealing her discharge. Transition of Care Plan: Home with At [facility] (accepted)    RUR: 26% (high RUR, readmission)  Prior Level of Functioning: Independent  Disposition: Home with At [facility] with PT/OT/SN/wound care/medication management and SW  If SNF or IPR: Date FOC offered: 10/31 [facility]  Date FOC received: 10/31 [facility]  Accepting facility: N/A  Date authorization started with reference number: N/A  Date authorization received and expires: N/A  Follow up appointments: PCP/specialists if needed. DME needed: None. Transportation at discharge: [facility] on will call to home address. [facility] PCS, FS, H&P and completed transportation folder on bedside chart. Completed SMAART tool left outside door. Completed [facility] magnet left outside door. IM/IMM Medicare/ letter given: 2nd IM received 11/1/2023  Is patient a  and connected with [facility]? No.   If yes, was [employer] transfer form completed and [facility] notified? N/A  Caregiver Contact: Les purcell - [phone]  Discharge Caregiver contacted prior to discharge? Patient to contact  Care Conference needed? No.  Barriers to discharge: Patient appealing discharge    7991 - CM contacted patient's pharmacy to receive price check on True Metrics script for strips and meter. They did not receive a script for the True Metrics strips and meter but confirmed that the strips are covered and the meter is $78.38. CM unable to reach attending via [employer]; will notify team during morning IDRs. 1124 - CM notified attending during IDRs to send order scripts/meter order to pharmacy.   CM spoke with patient who was notified that when CM called the pharmacy, the option they provided was True Metrics for under $80.  Patient stated that she cannot afford this and continuing to ask for wound care and physician despite both already visiting patient. CM explained that this is the option provided by her pharmacy and CM is unaware of any additional options not yet explored through her insurance and pharmacy; patient requested that CM leaves and said she would contact her pharmacy to address this. CM notified CM Management regarding patient stating she cannot afford medications and asked for assistance with case. CM Management confirmed patient has appealed discharge despite there not being an order. Patient did not confirm if her nephew will be available to let her into the home address on discharge. [facility] placed on will call. Services At/After Discharge     Transition of Care Consult (CM Consult) Home HealthTransition of Care Consult (CM Consult). Home Health. The comment is At [facility]. Taken on 11/2/23 1410   Internal Home Health No   Reason Outside Agency Chosen Patient already serviced by other home [address]; OT; PT; [address] Provided? --   Mode of Transport at Discharge [address] Time of Discharge --Hospital Transport Time of Discharge. no value. . The comment is will call. Taken on 11/2/23 1410   Confirm Follow Up Transport Family   Condition of Participation: Discharge Planning     The Plan for Transition of Care is related to the following treatment goals: [facility] - patient agreeable to [facility] with At [facility]   The Patient and/or Patient Representative was provided with a Choice of Provider? Patient   The Patient and/Or Patient Representative agree with the Discharge Plan?  Yes   Freedom of Choice list was provided with basic dialogue that supports the patient's individualized plan of care/goals, treatment preferences, and shares the quality data associated with the

## 2023-11-02 NOTE — PROGRESS NOTES
Attempted to schedule PCP hospital follow up. PCP staff at [facility] stated they spoke with the patient this morning regarding PCP hospital follow up. They stated that they advised the patient to call once they return home to schedule the PCP hospital follow up. Please note that PCP is a visiting physician and they schedule the appts with the patient to coordinate a time/date the patient will be at home. CMA cannot schedule appt time/date. Pending patient discharge.  Jerald Alfaro, Care Management Assistant

## 2023-11-02 NOTE — CARE COORDINATION
Medicare discharge appeal received from [facility]. Case control # [id number]. All requested notes, DND, and IMM letter was sent electronically to [facility]. Patient  given a copy of the DND and a copy was placed on patient's chart. Will continue to monitor the status of the appeal.     Patient states understanding that she will be provided script for in network diabetic testing equipment and will speak to her pharmacy regarding what is needed.     Michelle Marin RN, BSN, [employer]  Manager of Case Management  [phone]

## 2023-11-02 NOTE — PROGRESS NOTES
Bedside and Verbal shift change report given to Ashley Mansfield RN  (oncoming nurse) by Becki Contreras RN (offgoing nurse). Report included the following information Nurse Handoff Report, Index, ED Encounter Summary, Intake/Output, MAR, Recent Results, and Cardiac Rhythm **    End of Shift Note    Bedside shift change report given to RN (oncoming nurse) by Ashely Mansfield RN (offgoing nurse). Report included the following information SBAR, Kardex, ED Summary, Intake/Output, MAR, Recent Results, and Cardiac Rhythm NSR    Shift worked:  3028-2629     Shift summary and any significant changes:     No significant changes     Concerns for physician to address:       Zone phone for oncoming shift:          Activity:     Number times ambulated in hallways past shift: 0  Number of times OOB to chair past shift: 0    Cardiac:   Cardiac Monitoring: Yes           Access:  Current line(s): PIV     Genitourinary:   Urinary status: voiding and external catheter    Respiratory:      Chronic home O2 use?: YES  Incentive spirometer at bedside: NO       GI:     Current diet:  ADULT DIET; Regular  Passing flatus: YES  Tolerating current diet: YES       Pain Management:   Patient states pain is manageable on current regimen: YES    Skin:     Interventions: float heels, increase time out of bed, PT/OT consult, and internal/external urinary devices    Patient Safety:  Fall Score:    Interventions: bed/chair alarm, assistive device (walker, cane.  etc), gripper socks, pt to call before getting OOB, and stay with me (per policy)       Length of Stay:  Expected LOS: 3  Actual LOS: 3      Ashley Mansfield RN

## 2023-11-02 NOTE — PLAN OF CARE
Problem: Discharge Planning  Goal: Discharge to home or other facility with appropriate resources  Outcome: Progressing  Flowsheets (Taken 11/1/2023 2015 by Sabiha Desai, RN)  Discharge to home or other facility with appropriate resources: Identify barriers to discharge with patient and caregiver     Problem: Safety - Adult  Goal: Free from fall injury  Outcome: Progressing  Flowsheets (Taken 11/1/2023 2015 by Sabiha Desai, RN)  Free From Fall Injury: Instruct family/caregiver on patient safety     Problem: ABCDS Injury Assessment  Goal: Absence of physical injury  Outcome: Progressing     Problem: Respiratory - Adult  Goal: Achieves optimal ventilation and oxygenation  11/2/2023 0714 by Marshall Rios RT  Outcome: Progressing  Flowsheets (Taken 11/1/2023 2015 by Sabiha Desai, JULY)  Achieves optimal ventilation and oxygenation: Assess for changes in respiratory status

## 2023-11-02 NOTE — PROGRESS NOTES
1900 Bedside and Verbal shift change report given to Latasha Elliott (oncoming nurse) by Prisca Licona (offgoing nurse). Report included the following information Nurse Handoff Report, MAR, Recent Results, and Cardiac Rhythm NSR . Gold Score 16. All of the following interventions have been implemented to prevent pressure injury:    SKIN ASSESSMENT (S)  Dual skin assessment completed at shift change: Yes  Name of second RN who completed Dual Skin Assessment: Vicente Ferris RN  Picture of wound uploaded to EMR: Yes  Venelex ordered and given per protocol: Yes  Wound care consulted if wounds present: Yes    KEEP MOVING (K)  Mobility status (Bedrest, Chairbound, UWA x 1 assist , 2 assist, Max assist): Max  Q2 hour turns documented: Yes  Refusals to turn and education provided documented: Yes  Device used to float heels: Pillow/Foam  PT/OT consulted: Yes    INCONTINENCE (I)  Incontinence status assessed Q2 hours: Yes  External catheter in use: Purewick  Barrier cream in use: Venelex    NUTRITION (N)  I/O's documented every 8 hours: Yes  Oral supplements ordered if appropriate: Yes  Nutrition services consulted: Yes    All concerns about new DTI's must be escalated directly to attending MD, charge nurse, [address] [place] [address] and nurse director. End of Shift Note    Bedside shift change report given to [facility] (oncoming nurse) by Maycol Lopez RN (offgoing nurse).   Report included the following information SBAR, MAR, Recent Results, and Cardiac Rhythm NSR    Shift worked:  7pm-7am     Shift summary and any significant changes:     Kept comfortable and needs attended     Concerns for physician to address:        Zone phone for oncoming shift:           Activity:     Number times ambulated in hallways past shift: 0  Number of times OOB to chair past shift: 0    Cardiac:   Cardiac Monitoring: Yes           Access:  Current line(s): PIV     Genitourinary:   Urinary status: voiding and external catheter    Respiratory:      Chronic home O2 use?: YES  Incentive spirometer at bedside: YES       GI:     Current diet:  ADULT DIET; Regular  Passing flatus: YES  Tolerating current diet: YES       Pain Management:   Patient states pain is manageable on current regimen: YES    Skin:     Interventions: specialty bed, float heels, increase time out of bed, foam dressing, PT/OT consult, limit briefs, internal/external urinary devices, and nutritional support    Patient Safety:  Fall Score:    Interventions: bed/chair alarm, assistive device (walker, cane.  etc), gripper socks, pt to call before getting OOB, stay with me (per policy), and gait belt       Length of Stay:  Expected LOS: 3  Actual LOS: 3      Chester Jimenez RN

## 2023-11-02 NOTE — PROGRESS NOTES
Gold Score 16. All of the following interventions have been implemented to prevent pressure injury:    SKIN ASSESSMENT (S)  Dual skin assessment completed at shift change: Yes  Name of second RN who completed Dual Skin Assessment: JULY Martinez  Picture of wound uploaded to EMR: Yes  Venelex ordered and given per protocol: Yes  Wound care consulted if wounds present: Yes    SURFACE (S)  Sneha air pump or specialty bed ordered: No  Type of bed: Hill Rom  Only white chux used with specialty surfaces (no green chux used): NA  Waffle cushion used for chair positioning: Bedrest    KEEP MOVING (K)  Mobility status (Bedrest, Chairbound, UWA x 1 assist , 2 assist, Max assist): 2 assist  Q2 hour turns documented: Yes  Refusals to turn and education provided documented: Yes  Device used to float heels: Pillows  PT/OT consulted: Yes    INCONTINENCE (I)  Incontinence status assessed Q2 hours: Yes  External catheter in use: Yes  Barrier cream in use: yes    NUTRITION (N)  I/O's documented every 8 hours: Yes  Oral supplements ordered if appropriate: Yes  Nutrition services consulted: Yes    All concerns about new DTI's must be escalated directly to attending MD, charge nurse, [address] [place] [address] and nurse director.

## 2023-11-03 VITALS
BODY MASS INDEX: 47.09 KG/M2 | HEART RATE: 76 BPM | SYSTOLIC BLOOD PRESSURE: 150 MMHG | WEIGHT: 293 LBS | RESPIRATION RATE: 20 BRPM | HEIGHT: 66 IN | OXYGEN SATURATION: 94 % | DIASTOLIC BLOOD PRESSURE: 83 MMHG | TEMPERATURE: 98.6 F

## 2023-11-03 LAB
ALBUMIN SERPL-MCNC: 2.9 G/DL (ref 3.5–5)
ALBUMIN/GLOB SERPL: 0.8 (ref 1.1–2.2)
ALP SERPL-CCNC: 235 U/L (ref 45–117)
ALT SERPL-CCNC: 36 U/L (ref 12–78)
ANION GAP SERPL CALC-SCNC: 0 MMOL/L (ref 5–15)
AST SERPL-CCNC: 17 U/L (ref 15–37)
BASOPHILS # BLD: 0.1 K/UL (ref 0–0.1)
BASOPHILS NFR BLD: 1 % (ref 0–1)
BILIRUB SERPL-MCNC: 0.3 MG/DL (ref 0.2–1)
BUN SERPL-MCNC: 18 MG/DL (ref 6–20)
BUN/CREAT SERPL: 23 (ref 12–20)
CALCIUM SERPL-MCNC: 10.4 MG/DL (ref 8.5–10.1)
CHLORIDE SERPL-SCNC: 103 MMOL/L (ref 97–108)
CO2 SERPL-SCNC: 34 MMOL/L (ref 21–32)
CREAT SERPL-MCNC: 0.77 MG/DL (ref 0.55–1.02)
DIFFERENTIAL METHOD BLD: ABNORMAL
EOSINOPHIL # BLD: 0.3 K/UL (ref 0–0.4)
EOSINOPHIL NFR BLD: 3 % (ref 0–7)
ERYTHROCYTE [DISTWIDTH] IN BLOOD BY AUTOMATED COUNT: 13.2 % (ref 11.5–14.5)
GLOBULIN SER CALC-MCNC: 3.6 G/DL (ref 2–4)
GLUCOSE BLD STRIP.AUTO-MCNC: 116 MG/DL (ref 65–117)
GLUCOSE BLD STRIP.AUTO-MCNC: 124 MG/DL (ref 65–117)
GLUCOSE BLD STRIP.AUTO-MCNC: 136 MG/DL (ref 65–117)
GLUCOSE BLD STRIP.AUTO-MCNC: 210 MG/DL (ref 65–117)
GLUCOSE BLD STRIP.AUTO-MCNC: 270 MG/DL (ref 65–117)
GLUCOSE SERPL-MCNC: 143 MG/DL (ref 65–100)
HCT VFR BLD AUTO: 39.3 % (ref 35–47)
HGB BLD-MCNC: 12.5 G/DL (ref 11.5–16)
IMM GRANULOCYTES # BLD AUTO: 0.2 K/UL (ref 0–0.04)
IMM GRANULOCYTES NFR BLD AUTO: 2 % (ref 0–0.5)
LYMPHOCYTES # BLD: 0.8 K/UL (ref 0.8–3.5)
LYMPHOCYTES NFR BLD: 8 % (ref 12–49)
MCH RBC QN AUTO: 29.9 PG (ref 26–34)
MCHC RBC AUTO-ENTMCNC: 31.8 G/DL (ref 30–36.5)
MCV RBC AUTO: 94 FL (ref 80–99)
MONOCYTES # BLD: 1 K/UL (ref 0–1)
MONOCYTES NFR BLD: 10 % (ref 5–13)
NEUTS SEG # BLD: 7.8 K/UL (ref 1.8–8)
NEUTS SEG NFR BLD: 76 % (ref 32–75)
NRBC # BLD: 0 K/UL (ref 0–0.01)
NRBC BLD-RTO: 0 PER 100 WBC
PLATELET # BLD AUTO: 162 K/UL (ref 150–400)
PMV BLD AUTO: 10.5 FL (ref 8.9–12.9)
POTASSIUM SERPL-SCNC: 3.6 MMOL/L (ref 3.5–5.1)
PROT SERPL-MCNC: 6.5 G/DL (ref 6.4–8.2)
RBC # BLD AUTO: 4.18 M/UL (ref 3.8–5.2)
SERVICE CMNT-IMP: ABNORMAL
SERVICE CMNT-IMP: NORMAL
SODIUM SERPL-SCNC: 137 MMOL/L (ref 136–145)
WBC # BLD AUTO: 10.2 K/UL (ref 3.6–11)

## 2023-11-03 PROCEDURE — 36415 COLL VENOUS BLD VENIPUNCTURE: CPT

## 2023-11-03 PROCEDURE — 2580000003 HC RX 258: Performed by: INTERNAL MEDICINE

## 2023-11-03 PROCEDURE — 6360000002 HC RX W HCPCS: Performed by: INTERNAL MEDICINE

## 2023-11-03 PROCEDURE — 82962 GLUCOSE BLOOD TEST: CPT

## 2023-11-03 PROCEDURE — 85025 COMPLETE CBC W/AUTO DIFF WBC: CPT

## 2023-11-03 PROCEDURE — 6370000000 HC RX 637 (ALT 250 FOR IP): Performed by: INTERNAL MEDICINE

## 2023-11-03 PROCEDURE — 80053 COMPREHEN METABOLIC PANEL: CPT

## 2023-11-03 PROCEDURE — 2700000000 HC OXYGEN THERAPY PER DAY

## 2023-11-03 PROCEDURE — 94640 AIRWAY INHALATION TREATMENT: CPT

## 2023-11-03 RX ORDER — METHOCARBAMOL 750 MG/1
750 TABLET, FILM COATED ORAL 3 TIMES DAILY PRN
Qty: 30 TABLET | Refills: 0 | Status: SHIPPED | OUTPATIENT
Start: 2023-11-03

## 2023-11-03 RX ADMIN — INSULIN LISPRO 2 UNITS: 100 INJECTION, SOLUTION INTRAVENOUS; SUBCUTANEOUS at 16:56

## 2023-11-03 RX ADMIN — ACETAMINOPHEN 650 MG: 325 TABLET ORAL at 03:16

## 2023-11-03 RX ADMIN — FUROSEMIDE 40 MG: 40 TABLET ORAL at 10:35

## 2023-11-03 RX ADMIN — INSULIN GLARGINE 40 UNITS: 100 INJECTION, SOLUTION SUBCUTANEOUS at 10:33

## 2023-11-03 RX ADMIN — PREDNISONE 40 MG: 20 TABLET ORAL at 10:30

## 2023-11-03 RX ADMIN — SENNOSIDES AND DOCUSATE SODIUM 2 TABLET: 50; 8.6 TABLET ORAL at 10:29

## 2023-11-03 RX ADMIN — HYDROMORPHONE HYDROCHLORIDE 2 MG: 2 TABLET ORAL at 10:32

## 2023-11-03 RX ADMIN — IPRATROPIUM BROMIDE 0.5 MG: 0.5 SOLUTION RESPIRATORY (INHALATION) at 11:58

## 2023-11-03 RX ADMIN — IPRATROPIUM BROMIDE 0.5 MG: 0.5 SOLUTION RESPIRATORY (INHALATION) at 16:13

## 2023-11-03 RX ADMIN — METHOCARBAMOL TABLETS 750 MG: 500 TABLET, COATED ORAL at 15:48

## 2023-11-03 RX ADMIN — ASPIRIN 81 MG: 81 TABLET, CHEWABLE ORAL at 10:30

## 2023-11-03 RX ADMIN — ANASTROZOLE 1 MG: 1 TABLET, COATED ORAL at 10:32

## 2023-11-03 RX ADMIN — ACETAMINOPHEN 650 MG: 325 TABLET ORAL at 15:48

## 2023-11-03 RX ADMIN — Medication: at 10:34

## 2023-11-03 RX ADMIN — APIXABAN 5 MG: 5 TABLET, FILM COATED ORAL at 10:31

## 2023-11-03 RX ADMIN — METOPROLOL TARTRATE 25 MG: 25 TABLET, FILM COATED ORAL at 10:30

## 2023-11-03 RX ADMIN — IPRATROPIUM BROMIDE 0.5 MG: 0.5 SOLUTION RESPIRATORY (INHALATION) at 09:20

## 2023-11-03 RX ADMIN — SODIUM CHLORIDE, PRESERVATIVE FREE 10 ML: 5 INJECTION INTRAVENOUS at 10:33

## 2023-11-03 RX ADMIN — GUAIFENESIN 600 MG: 600 TABLET, EXTENDED RELEASE ORAL at 10:31

## 2023-11-03 RX ADMIN — ARFORMOTEROL TARTRATE: 15 SOLUTION RESPIRATORY (INHALATION) at 09:20

## 2023-11-03 ASSESSMENT — PAIN - FUNCTIONAL ASSESSMENT
PAIN_FUNCTIONAL_ASSESSMENT: ACTIVITIES ARE NOT PREVENTED
PAIN_FUNCTIONAL_ASSESSMENT: ACTIVITIES ARE NOT PREVENTED

## 2023-11-03 ASSESSMENT — PAIN DESCRIPTION - LOCATION
LOCATION: BACK;SACRUM;LEG
LOCATION: HEAD
LOCATION: GENERALIZED

## 2023-11-03 ASSESSMENT — PAIN SCALES - GENERAL
PAINLEVEL_OUTOF10: 7
PAINLEVEL_OUTOF10: 0
PAINLEVEL_OUTOF10: 7
PAINLEVEL_OUTOF10: 3

## 2023-11-03 ASSESSMENT — PAIN DESCRIPTION - ORIENTATION: ORIENTATION: RIGHT;LEFT

## 2023-11-03 ASSESSMENT — PAIN DESCRIPTION - DESCRIPTORS
DESCRIPTORS: ACHING

## 2023-11-03 ASSESSMENT — PAIN SCALES - WONG BAKER
WONGBAKER_NUMERICALRESPONSE: 0
WONGBAKER_NUMERICALRESPONSE: 0

## 2023-11-03 NOTE — DISCHARGE SUMMARY
Hospitalist Discharge Note    NAME:   Yoanna Millan   : 1970   MRN: [id number]     Admit date: 10/30/2023    Discharge date: 23    PCP: Aries Mcpherson MD    Discharge Diagnoses:    Discharge Medications:  Current Discharge Medication List        START taking these medications    Details   !! blood glucose test strips (ASCENSIA AUTODISC VI;ONE TOUCH ULTRA TEST VI) strip 1 each by In Vitro route daily As needed. Qty: 100 each, Refills: 3      Blood Glucose Monitoring Suppl (TRUE METRIX METER) HIPOLITO Use as directed  Qty: 1 each, Refills: 0      !! predniSONE (DELTASONE) 20 MG tablet Take 2 tablets by mouth daily for 10 days  Qty: 20 tablet, Refills: 0      cefdinir (OMNICEF) 300 MG capsule Take 1 capsule by mouth 2 times daily for 2 days  Qty: 4 capsule, Refills: 0      !! blood glucose monitor strips Test 3 times a day & as needed for symptoms of irregular blood glucose. Dispense sufficient amount for indicated testing frequency plus additional to accommodate PRN testing needs. Qty: 500 strip, Refills: 0    Comments: Brand per patient preference. May round up to next available package size. !! - Potential duplicate medications found. Please discuss with provider. CONTINUE these medications which have CHANGED    Details   methocarbamol (ROBAXIN) 750 MG tablet Take 1 tablet by mouth 3 times daily as needed (pain or spasm)  Qty: 30 tablet, Refills: 0    Associated Diagnoses: Carcinoma of right breast metastatic to bone (HCC)      HYDROmorphone (DILAUDID) 2 MG tablet Take 1 tablet by mouth every 4 hours as needed for Pain for up to 7 days.  Max Daily Amount: 12 mg  Qty: 30 tablet, Refills: 0    Comments: Reduce doses taken as pain becomes manageable  Associated Diagnoses: Carcinoma of right breast metastatic to bone (HCC)      furosemide (LASIX) 40 MG tablet Take 1 tablet by mouth daily  Qty: 60 tablet, Refills: 3           CONTINUE these medications which have NOT CHANGED exacerbation POA  Pneumonia left lower lung POA  -Chest x-ray Left basilar airspace disease/effusion, noted previously  - Rocephin and zithromax x 5 days, 2 more doses starting tonight  - Prednisone 40mg daily x 5 days, received 4th dose today, 1 more dose in Am  - C/w  Trelegy inhaler  - Nebs as needed  - Blood cultures NTD   - Procalcitonin<0.05  - pBNP 239     Recent pulmonary embolism, 9/28/2023  -Has not been taking her Eliquis  - resumed in hospital, CM reports patient has med at home  - Re educated about importance of being compliant with her Eliquis. CAD  chronic CHF, unable to specify LVEF  HTN  -Recent echo, unable to assess LVEF due to body habitus. Poor windows  -Continue aspirin and statin  -Continue Lasix and metoprolol     DM type 2 on insulin  -Restart Lantus 40 units daily with sliding scale as needed  - Script for glucometer sent     Metastatic breast cancer POA mainly to bone  Chronic Pain syndrome due to Bony mets POA  Morbid Obesity POA  -Seen by palliative care last admission, not yet ready for hospice  -Recently completed radiation on 10/13  -Continue anastrozole  -Reorder PTA oral Dilaudid 2 mg every 3-4 hours as needed  -Robaxin as needed. Lidocaine patch to her back     Ex-smoker, quit 2018     Medical Decision Making:  Labs reviewed by myself: CBC, BMP  Diagnostic data reviewed by myself:  CXR  Toxic drug monitoring:  Discussed case: IDR       Code Status: Full  Surrogate Decision Maker:     DVT Prophylaxis: Restarting Eliquis     Baseline: Single, lives with nephew. Does not walk much but has a rollator and wheelchair to use. Subjective:     Chief Complaint / Reason for Physician Visit  Follow-up pneumonia  Increased cough with phlegm this AM, less SOB now  Currently on baseline o2  Set up for discharge today  Discussed with RN events overnight. Objective:     VITALS:   Last 24hrs VS reviewed since prior progress note.  Most recent are:  Patient Vitals for the past 24 hrs:

## 2023-11-03 NOTE — CARE COORDINATION
Shahrzadtrevin Angel decision on Medicare appeal received on 11/3/2023 which agrees with termination of services and patient liability to begin on 11/4/2023. Patient is requesting to leave today and transport has been arranged with [facility] for 5PM.  Patient states someone will be at home by 6PM to assist with her care and let her in the home.     aNel Sullivan RN, BSN, [employer]  Manager of Case Management  [phone]

## 2023-11-03 NOTE — PROGRESS NOTES
End of Shift Note    Bedside shift change report given to [address] [place] [address] 231 (oncoming nurse) by Chris River RN (offgoing nurse). Report included the following information Nurse Handoff Report      Shift worked:  [id number]   Shift summary and any significant changes:     Administered medications as ordered. Monitored vitals and assess patient every 3-5 hours. Bed alarm is armed. Call bell is within reach and the patient is able to demonstrate how to call out appropriately. Patient is able to demonstrate how to turn and pull herself up in bed without staff assistance. Patient is able to teach back pressure relief education. Patient is instructed to turn every 2 hours around eating. Patient refuses to take incontinence brief off and demands staff to assist with changing to a new brief when soiled despite education on how this can make her skin breakdown worse. Patient complains of severe generalized pain controlled with medications. Patient reports this pain is consistent with her baseline pain. Vital signs remained stable. Labs are drawn and sent. No significant changes.         Concerns for physician to address:  None    Zone phone for oncoming shift:        Patient Information  Michelle Green  48 y.o.  10/30/2023  5:40 PM by Catia Walker MD. Michelle Green was admitted from Home    Problem List  Patient Active Problem List    Diagnosis Date Noted    Community acquired pneumonia of left lung, unspecified part of lung 10/30/2023    Hypomagnesemia 10/24/2023    Recurrent bacteremia 10/18/2023    Coagulase negative Staphylococcus bacteremia 10/18/2023    Aspiration pneumonia of both upper lobes due to gastric secretions ([address]) 10/18/2023    Full code status 10/18/2023    Palliative care encounter 10/18/2023    Pain 10/18/2023    Tachycardia 10/12/2023    Chronic pain syndrome 10/12/2023    Carcinoma of breast metastatic to multiple sites ([address]) 10/12/2023    Acute and chronic respiratory failure with hypoxia ([address]) 10/12/2023 Vanessa Robles RN

## 2023-11-03 NOTE — CARE COORDINATION
Patient is clear from a CM standpoint but she is appealing her discharge. Transition of Care Plan: Home with [facility] (accepted)     RUR: 26% (high RUR, readmission)  Prior Level of Functioning: Independent  Disposition: Home with [facility] with PT/OT/SN/wound care/medication management and SW  If SNF or IPR: Date FOC offered: 10/31 [facility] [facility]  Date FOC received: 10/31 [facility]  Accepting facility: N/A  Date authorization started with reference number: N/A  Date authorization received and expires: N/A  Follow up appointments: PCP/specialists if needed. DME needed: None. Transportation at discharge: [facility] at 5pm on will call to home address. [facility] PCS, FS, H&P and completed transportation folder on bedside chart. Completed SMAART tool left outside door. Completed [facility] magnet left outside door. IM/IMM Medicare/ letter given: 2nd IM received 11/1/2023  Is patient a Carpenter and connected with [facility]? No.              If yes, was [employer] transfer form completed and [facility] notified? N/A  Caregiver Contact: Miri purcell - [phone]  Discharge Caregiver contacted prior to discharge? Patient to contact  Care Conference needed? No.  Barriers to discharge: Patient appealing discharge    1118 - RN said patient requested transport at 3pm today but could not confirm if someone will be able to let her in. [facility] requested for 3pm.    1230 - RN and [employer] notified of patient's transport at [address] notified of patient's request to go home today. [address] notified of discharge today. Services At/After Discharge      Transition of Care Consult (CM Consult) Home HealthTransition of Care Consult (CM Consult). Home Health. The comment is [facility].    Internal Home Health No   Reason Outside Agency Chosen Patient already serviced by other home [address]; OT; PT; [address] Resource Information Provided? --   Mode of

## 2023-11-04 NOTE — PLAN OF CARE
Problem: Respiratory - Adult  Goal: Achieves optimal ventilation and oxygenation  11/3/2023 2151 by Echo Negron RN  Outcome: Adequate for Discharge  11/3/2023 1801 by Milagros Paul RN  Outcome: Progressing  11/3/2023 1653 by Francisco Phillip, RT  Outcome: Progressing

## 2023-11-04 NOTE — PROGRESS NOTES
Pt to be d/c'd. D/C instructions discussed with the pt and given a copy. EMS took whatever paperwork they needed out of her room box. Pt supervised while her belongings were gathered. Items included her cards that she kept in her gown pocket, her glasses, cell phone, , and multiple bags of her belongings and items she accumulated from her hospital stay. EMS at bedside. IV removed without incident. Catheter intact. Pressure held briefly. No bleeding noted, but pressure dressing applied for her trip home. Assist x4 used to transfer pt to [facility]. No issues.  Pt had a difficult time getting comfortable, but once she verbalized a point of comfort, they left the unit around 2100

## 2023-11-04 NOTE — PLAN OF CARE
Problem: Discharge Planning  Goal: Discharge to home or other facility with appropriate resources  Outcome: Completed     Problem: Safety - Adult  Goal: Free from fall injury  11/3/2023 2151 by Yovany Mcmahon RN  Outcome: Completed  11/3/2023 1801 by Carlos Aleman RN  Outcome: Progressing     Problem: Chronic Conditions and Co-morbidities  Goal: Patient's chronic conditions and co-morbidity symptoms are monitored and maintained or improved  11/3/2023 2151 by Yovany Mcmahon RN  Outcome: Adequate for Discharge  11/3/2023 1801 by Carlos Aleman RN  Outcome: Progressing     Problem: Skin/Tissue Integrity  Goal: Absence of new skin breakdown  Description: 1. Monitor for areas of redness and/or skin breakdown  2. Assess vascular access sites hourly  3. Every 4-6 hours minimum:  Change oxygen saturation probe site  4. Every 4-6 hours:  If on nasal continuous positive airway pressure, respiratory therapy assess nares and determine need for appliance change or resting period.   11/3/2023 2151 by Yovany Mcmahon RN  Outcome: Completed  11/3/2023 1801 by Carlos Aleman RN  Outcome: Progressing     Problem: Pain  Goal: Verbalizes/displays adequate comfort level or baseline comfort level  11/3/2023 2151 by Yovany Mcmahon RN  Outcome: Adequate for Discharge  11/3/2023 1801 by Carlos Aleman RN  Outcome: Progressing     Problem: Respiratory - Adult  Goal: Achieves optimal ventilation and oxygenation  11/3/2023 2151 by Yovany Mcmahon RN  Outcome: Adequate for Discharge  11/3/2023 1801 by Carlos Aleman RN  Outcome: Progressing  11/3/2023 1653 by Buck Valentino, RT  Outcome: Progressing     Problem: ABCDS Injury Assessment  Goal: Absence of physical injury  11/3/2023 2151 by Yovany Mcmahon RN  Outcome: Completed  11/3/2023 1801 by Carlos Aleman RN  Outcome: Progressing

## 2023-11-07 NOTE — TELEPHONE ENCOUNTER
Pt called this morning in reference to having pain all over her body and wants to schedule an appt with Dr. Lj Levin. Pt stated that they need to take better care of their health.
H

## 2023-11-09 NOTE — PROGRESS NOTES
Physician Progress Note      Gavin Fox  [facility] #:                  [id number]  :                       1970  ADMIT DATE:       10/30/2023 5:40 PM  [facility] DATE:        11/3/2023 8:30 PM  RESPONDING  PROVIDER #:        Marci Seymour MD          QUERY TEXT:    52yoF patient admitted with COPD exacerbation, LL lung Pneumonia and acute on   chronic respiratory failure baseline 5LPM.  In order to support the diagnosis   of acute respiratory failure, please include additional clinical indicators in   your documentation. Or please document if the diagnosis of acute respiratory   failure has been ruled out after further study. The medical record reflects the following:  Risk Factors: morbid obesity BMI 58, hx Asthma, COPD, ex-smoker, recent PE  Clinical Indicators: in ED, pt with tachypnea, decreased breath sounds. ER   vitals  RR 28 - 22 86 - 88% on 5L  increased to 6L @  97 - 93%. Pt weaned to   4L NC O2 sats 94 - 93% during hospital stay. Noted dyspnea on exertion. Treatment: Duonebs, IV antibiotics, titrate supplemental O2, continous pulse   oximetry, incentive spirometer. Acute Respiratory Failure Clinical Indicators per  MS-DRG Training Guide and   Quick Reference Guide:  pO2 < 60 mmHg or SpO2 (pulse oximetry) < 91% breathing room air  pCO2 > 50 and pH < 7.35  P/F ratio (pO2 / FIO2) < 300  pO2 decrease or pCO2 increase by 10 mmHg from baseline (if known)  Supplemental oxygen of 40% or more  Presence of respiratory distress, tachypnea, dyspnea, shortness of breath,   wheezing  Unable to speak in complete sentences  Use of accessory muscles to breathe  Extreme anxiety and feeling of impending doom  Tripod position  Confusion/altered mental status/obtunded  Options provided:  -- Acute Respiratory Failure as evidenced by, Please document evidence.   -- Acute Respiratory Failure ruled out after study  -- Acute Respiratory Failure ruled out after study and Chronic Respiratory

## 2023-11-16 ENCOUNTER — TELEPHONE (OUTPATIENT)
Age: 53
End: 2023-11-16

## 2023-11-16 NOTE — TELEPHONE ENCOUNTER
Is this patient supposed to be following up with us?   She called for a refill, I saw where Dr Shin Hess. Was apart of her care team.

## 2023-11-17 PROBLEM — R52 PAIN: Status: RESOLVED | Noted: 2023-10-18 | Resolved: 2023-11-17

## 2023-11-17 NOTE — TELEPHONE ENCOUNTER
Maryjane Manjarrez saw pt in the hospital and said the below. > Palliative care for pain control. She is hospice appropriate but recieves her care from [facility] oncology. She will follow with them as outpatient   > PE treatment with heparin until clinically stable, then ok to transition to eliquis. Please tell pt to call [facility].

## 2023-11-22 NOTE — PROGRESS NOTES
Patient discharged to home via [facility] with all belongings. Opportunity for questions and clarification given. Vtama Pregnancy And Lactation Text: It is unknown if this medication can cause problems during pregnancy and breastfeeding.

## 2024-03-08 NOTE — CARDIO/PULMONARY
ADULT ANNUAL PHYSICAL  [facility]    NAME: Ted Morales  AGE: 50 y.o. SEX: male  : 1973     DATE: 3/8/2024     Assessment and Plan:     Problem List Items Addressed This Visit       Bipolar disorder, in full remission, most recent episode mixed (HCC)     Remains in remission, quite functional.           Adrenal nodule (HCC)     Workup remains at negative.  Continues with follow-up with endocrinology.  Currently on spironolactone 80 mg daily for blood pressure issues.         Resistant hypertension     Think this is not adequately controlled at this time.  Will increase his hydralazine from 25 to 50 mg 3 times daily.  Continue to monitor at home.  Notably his allergy to ACE inhibitors and ARB's, causing anaphylaxis.         Relevant Medications    hydrALAZINE (APRESOLINE) 50 mg tablet    Other Relevant Orders    Comprehensive metabolic panel    CBC and differential    Wellness examination - Primary     Discussed diet and exercise.  He is on aggressive weightlifter, does not do a lot of aerobic exercise.  Strongly encouraged walking the treadmill more.  Reviewed his medications.  Notably following with endocrine as well.          Other Visit Diagnoses       Screening for colon cancer        Agreeable to colon screening, referred to GI    Relevant Orders    Ambulatory referral to Gastroenterology    Need for hepatitis C screening test        Relevant Orders    Hepatitis C antibody    Screening for prostate cancer        Relevant Orders    PSA, Total Screen    Mixed hyperlipidemia        Relevant Orders    Lipid Panel with Direct LDL reflex    Hypogonadism in male        Recheck testosterone to ensure adequate replacement.    Relevant Orders    Testosterone, free, total            Immunizations and preventive care screenings were discussed with patient today. Appropriate education was printed on patient's after visit summary.    Discussed risks  CP Rehab Note: chart review    Patient is on CHF bundle list         Admit: chest pain r/t Chest pain in setting of chronic hypoxic respiratory failure, obesity hypoventilation and GEN/noncompliance with CPAP     Mhx: CAD, CHF, COPD, DM, HTN, NSTEMI, SVT, morbid obesity, stents     Current smoker.     DIET DIABETIC CONSISTENT CARB Regular; 2 GM NA (House Low NA); FR 1500ML    Echo-LVEF 55-60%. Cardiology consulted. History of CAD including  of LAD, REED to Ramus in the past, but EKG, enzymes and echo normal.  Likely related to acute respiratory failure. No further ischemic evaluation needed at this time. Follow-up with me within 2-4 weeks of discharge to consider further evaluation. CHF teaching 9/2017 by Cardiac Rehab. This was a follow-up visit to answer questions and reinforce prior teaching re: CHF, S&Ss, medication management, Low NA diet, daily weights, when to call the doctor and balancing rest/activity      Handout on Heart Failure: Watching your fluids reviewed with pt. Says she does have a scale at home and will have someone take her to get groceries. States she is to see Pulmonary MD today. All questions answered. Understanding verbalized. and benefits of prostate cancer screening. We discussed the controversial history of PSA screening for prostate cancer in the [place] as well as the risk of over detection and over treatment of prostate cancer by way of PSA screening.  The patient understands that PSA blood testing is an imperfect way to screen for prostate cancer and that elevated PSA levels in the blood may also be caused by infection, inflammation, prostatic trauma or manipulation, urological procedures, or by benign prostatic enlargement.    The role of the digital rectal examination in prostate cancer screening was also discussed and I discussed with him that there is large interobserver variability in the findings of digital rectal examination.    Counseling:  Dental Health: discussed importance of regular tooth brushing, flossing, and dental visits.  Exercise: the importance of regular exercise/physical activity was discussed. Recommend exercise 3-5 times per week for at least 30 minutes.       Depression Screening and Follow-up Plan: Patient was screened for depression during today's encounter. They screened negative with a PHQ-2 score of 0.        Return in about 2 months (around 5/8/2024).     Chief Complaint:     Chief Complaint   Patient presents with    Physical Exam     Patient states he is here for an annual physical.       History of Present Illness:     Adult Annual Physical   Patient here for a comprehensive physical exam. The patient reports no problems.    Diet and Physical Activity  Diet/Nutrition: limited junk food.   Exercise: 3-4 times a week on average.      Depression Screening  PHQ-2/9 Depression Screening    Little interest or pleasure in doing things: 0 - not at all  Feeling down, depressed, or hopeless: 0 - not at all  PHQ-2 Score: 0  PHQ-2 Interpretation: Negative depression screen       General Health  Sleep: sleeps well and gets 7-8 hours of sleep on average.   Hearing: normal - bilateral.  Vision: goes for  regular eye exams and most recent eye exam <1 year ago.   Dental: regular dental visits.        Health  Symptoms include: none    Advanced Care Planning  Do you have an advanced directive? no  Do you have a durable medical power of ? no  ACP document given to patient? no     Review of Systems:     Review of Systems   Constitutional:  Positive for fatigue. Negative for chills and fever.   HENT:  Negative for rhinorrhea and sore throat.    Eyes:  Negative for visual disturbance.   Respiratory:  Negative for cough and shortness of breath.    Cardiovascular:  Negative for chest pain and leg swelling.   Gastrointestinal:  Negative for abdominal pain, diarrhea, nausea and vomiting.   Genitourinary:  Negative for dysuria.   Musculoskeletal:  Positive for arthralgias and back pain. Negative for myalgias.   Skin:  Negative for rash.   Neurological:  Negative for dizziness and headaches.   Psychiatric/Behavioral:  Negative for confusion. The patient is nervous/anxious.         Mood disorder   All other systems reviewed and are negative.     Past Medical History:     Past Medical History:   Diagnosis Date    Disease of thyroid gland     Hyperlipidemia     Hypertension       Past Surgical History:     Past Surgical History:   Procedure Laterality Date    TONSILLECTOMY        Family History:     History reviewed. No pertinent family history.   Social History:     Social History     Socioeconomic History    Marital status: /Civil Union     Spouse name: None    Number of children: None    Years of education: None    Highest education level: None   Occupational History    None   Tobacco Use    Smoking status: Never     Passive exposure: Never    Smokeless tobacco: Never   Vaping Use    Vaping status: Never Used   Substance and Sexual Activity    Alcohol use: Not Currently    Drug use: Never    Sexual activity: None   Other Topics Concern    None   Social History Narrative    None     Social Determinants of Health      Financial Resource Strain: Not on file   Food Insecurity: Not on file   Transportation Needs: Not on file   Physical Activity: Not on file   Stress: Not on file   Social Connections: Not on file   Intimate Partner Violence: Not on file   Housing Stability: Not on file      Current Medications:     Current Outpatient Medications   Medication Sig Dispense Refill    amLODIPine (NORVASC) 10 mg tablet Take 1 tablet (10 mg total) by mouth daily 90 tablet 3    aspirin 81 mg chewable tablet Chew 81 mg daily      atorvastatin (LIPITOR) 10 mg tablet Take 1 tablet (10 mg total) by mouth daily 90 tablet 3    carBAMazepine (TEGretol) 200 mg tablet TAKE 2 TABLETS IN THE MORNING AND 2 TABLETS IN THE EVENING, EVERY 12 HOURS 360 tablet 3    hydrALAZINE (APRESOLINE) 50 mg tablet Take 1 tablet (50 mg total) by mouth 3 (three) times a day 90 tablet 3    metoprolol succinate (TOPROL-XL) 50 mg 24 hr tablet Take 1 tablet (50 mg total) by mouth daily 90 tablet 3    SEROquel  MG 24 hr tablet Take 2 tablets (600 mg total) by mouth daily at bedtime 180 tablet 3    spironolactone (ALDACTONE) 50 mg tablet Take 1 tablet (50 mg total) by mouth daily 90 tablet 1    testosterone (ANDROGEL) 1% Apply 1 packet (50 mg total) topically daily 150 packet 0    testosterone (ANDROGEL) 1% APPLY 1  PACKET TOPICALLY ONCE DAILY 150 g 0     No current facility-administered medications for this visit.      Allergies:     Allergies   Allergen Reactions    Losartan Anaphylaxis      Physical Exam:     /88 (BP Location: Left arm, Patient Position: Sitting, Cuff Size: Adult)   Pulse 59   Temp 97.9 °F (36.6 °C) (Tympanic)   Ht 6' (1.829 m)   Wt 119 kg (261 lb 9.6 oz)   SpO2 97%   BMI 35.48 kg/m²     Physical Exam  Vitals and nursing note reviewed.   Constitutional:       General: He is not in acute distress.     Appearance: Normal appearance. He is well-developed and normal weight.   HENT:      Head: Normocephalic and atraumatic.   Eyes:       Conjunctiva/sclera: Conjunctivae normal.      Pupils: Pupils are equal, round, and reactive to light.   Cardiovascular:      Rate and Rhythm: Normal rate and regular rhythm.      Heart sounds: No murmur heard.     Gallop present.   Pulmonary:      Effort: Pulmonary effort is normal. No respiratory distress.      Breath sounds: Normal breath sounds.   Abdominal:      General: Abdomen is flat.      Palpations: Abdomen is soft.      Tenderness: There is no abdominal tenderness.   Musculoskeletal:         General: No swelling.      Cervical back: Normal range of motion and neck supple.   Skin:     General: Skin is warm and dry.      Capillary Refill: Capillary refill takes less than 2 seconds.   Neurological:      General: No focal deficit present.      Mental Status: He is alert and oriented to person, place, and time.   Psychiatric:         Mood and Affect: Mood normal.          Ron Farrell DO  [facility]

## 2024-11-25 ENCOUNTER — APPOINTMENT (OUTPATIENT)
Facility: HOSPITAL | Age: 54
DRG: 189 | End: 2024-11-25
Payer: MEDICARE

## 2024-11-25 ENCOUNTER — HOSPITAL ENCOUNTER (INPATIENT)
Facility: HOSPITAL | Age: 54
LOS: 4 days | Discharge: HOSPICE | DRG: 189 | End: 2024-11-30
Attending: EMERGENCY MEDICINE | Admitting: STUDENT IN AN ORGANIZED HEALTH CARE EDUCATION/TRAINING PROGRAM
Payer: MEDICARE

## 2024-11-25 DIAGNOSIS — J44.1 COPD EXACERBATION (HCC): ICD-10-CM

## 2024-11-25 DIAGNOSIS — R79.89 ELEVATED TROPONIN: Primary | ICD-10-CM

## 2024-11-25 LAB
ALBUMIN SERPL-MCNC: 3.1 G/DL (ref 3.5–5)
ALBUMIN/GLOB SERPL: 0.8 (ref 1.1–2.2)
ALP SERPL-CCNC: 296 U/L (ref 45–117)
ALT SERPL-CCNC: 24 U/L (ref 12–78)
ANION GAP SERPL CALC-SCNC: ABNORMAL MMOL/L (ref 2–12)
AST SERPL-CCNC: 21 U/L (ref 15–37)
BASOPHILS # BLD: 0 K/UL (ref 0–0.1)
BASOPHILS NFR BLD: 0 % (ref 0–1)
BILIRUB SERPL-MCNC: 0.6 MG/DL (ref 0.2–1)
BUN SERPL-MCNC: 15 MG/DL (ref 6–20)
BUN/CREAT SERPL: 25 (ref 12–20)
CALCIUM SERPL-MCNC: 10.7 MG/DL (ref 8.5–10.1)
CHLORIDE SERPL-SCNC: 103 MMOL/L (ref 97–108)
CO2 SERPL-SCNC: 39 MMOL/L (ref 21–32)
CREAT SERPL-MCNC: 0.59 MG/DL (ref 0.55–1.02)
DIFFERENTIAL METHOD BLD: ABNORMAL
EOSINOPHIL # BLD: 0 K/UL (ref 0–0.4)
EOSINOPHIL NFR BLD: 0 % (ref 0–7)
ERYTHROCYTE [DISTWIDTH] IN BLOOD BY AUTOMATED COUNT: 13 % (ref 11.5–14.5)
GLOBULIN SER CALC-MCNC: 3.9 G/DL (ref 2–4)
GLUCOSE BLD STRIP.AUTO-MCNC: 150 MG/DL (ref 65–117)
GLUCOSE SERPL-MCNC: 164 MG/DL (ref 65–100)
HCT VFR BLD AUTO: 48.3 % (ref 35–47)
HGB BLD-MCNC: 13.9 G/DL (ref 11.5–16)
IMM GRANULOCYTES # BLD AUTO: 0.2 K/UL (ref 0–0.04)
IMM GRANULOCYTES NFR BLD AUTO: 1 % (ref 0–0.5)
LACTATE BLD-SCNC: 1 MMOL/L (ref 0.4–2)
LYMPHOCYTES # BLD: 0.6 K/UL (ref 0.8–3.5)
LYMPHOCYTES NFR BLD: 4 % (ref 12–49)
MCH RBC QN AUTO: 29 PG (ref 26–34)
MCHC RBC AUTO-ENTMCNC: 28.8 G/DL (ref 30–36.5)
MCV RBC AUTO: 100.8 FL (ref 80–99)
MONOCYTES # BLD: 1.7 K/UL (ref 0–1)
MONOCYTES NFR BLD: 11 % (ref 5–13)
NEUTS SEG # BLD: 12.8 K/UL (ref 1.8–8)
NEUTS SEG NFR BLD: 84 % (ref 32–75)
NRBC # BLD: 0 K/UL (ref 0–0.01)
NRBC BLD-RTO: 0 PER 100 WBC
NT PRO BNP: 1414 PG/ML
PLATELET # BLD AUTO: 116 K/UL (ref 150–400)
PMV BLD AUTO: 10.8 FL (ref 8.9–12.9)
POTASSIUM SERPL-SCNC: 4.3 MMOL/L (ref 3.5–5.1)
PROCALCITONIN SERPL-MCNC: 0.15 NG/ML
PROT SERPL-MCNC: 7 G/DL (ref 6.4–8.2)
RBC # BLD AUTO: 4.79 M/UL (ref 3.8–5.2)
RBC MORPH BLD: ABNORMAL
SERVICE CMNT-IMP: ABNORMAL
SODIUM SERPL-SCNC: 141 MMOL/L (ref 136–145)
TROPONIN I SERPL HS-MCNC: 52 NG/L (ref 0–51)
TROPONIN I SERPL HS-MCNC: 54 NG/L (ref 0–51)
WBC # BLD AUTO: 15.3 K/UL (ref 3.6–11)

## 2024-11-25 PROCEDURE — 36415 COLL VENOUS BLD VENIPUNCTURE: CPT

## 2024-11-25 PROCEDURE — 71275 CT ANGIOGRAPHY CHEST: CPT

## 2024-11-25 PROCEDURE — 80053 COMPREHEN METABOLIC PANEL: CPT

## 2024-11-25 PROCEDURE — 87040 BLOOD CULTURE FOR BACTERIA: CPT

## 2024-11-25 PROCEDURE — 83605 ASSAY OF LACTIC ACID: CPT

## 2024-11-25 PROCEDURE — 6360000002 HC RX W HCPCS

## 2024-11-25 PROCEDURE — 6360000004 HC RX CONTRAST MEDICATION: Performed by: EMERGENCY MEDICINE

## 2024-11-25 PROCEDURE — 99285 EMERGENCY DEPT VISIT HI MDM: CPT

## 2024-11-25 PROCEDURE — 96375 TX/PRO/DX INJ NEW DRUG ADDON: CPT

## 2024-11-25 PROCEDURE — 81001 URINALYSIS AUTO W/SCOPE: CPT

## 2024-11-25 PROCEDURE — 82962 GLUCOSE BLOOD TEST: CPT

## 2024-11-25 PROCEDURE — 2580000003 HC RX 258

## 2024-11-25 PROCEDURE — 83880 ASSAY OF NATRIURETIC PEPTIDE: CPT

## 2024-11-25 PROCEDURE — 94640 AIRWAY INHALATION TREATMENT: CPT

## 2024-11-25 PROCEDURE — 84145 PROCALCITONIN (PCT): CPT

## 2024-11-25 PROCEDURE — 93005 ELECTROCARDIOGRAM TRACING: CPT

## 2024-11-25 PROCEDURE — 84484 ASSAY OF TROPONIN QUANT: CPT

## 2024-11-25 PROCEDURE — 96374 THER/PROPH/DIAG INJ IV PUSH: CPT

## 2024-11-25 PROCEDURE — 6370000000 HC RX 637 (ALT 250 FOR IP)

## 2024-11-25 PROCEDURE — 85025 COMPLETE CBC W/AUTO DIFF WBC: CPT

## 2024-11-25 PROCEDURE — 87154 CUL TYP ID BLD PTHGN 6+ TRGT: CPT

## 2024-11-25 PROCEDURE — 6360000002 HC RX W HCPCS: Performed by: EMERGENCY MEDICINE

## 2024-11-25 RX ORDER — FUROSEMIDE 10 MG/ML
40 INJECTION INTRAMUSCULAR; INTRAVENOUS ONCE
Status: COMPLETED | OUTPATIENT
Start: 2024-11-25 | End: 2024-11-25

## 2024-11-25 RX ORDER — IPRATROPIUM BROMIDE AND ALBUTEROL SULFATE 2.5; .5 MG/3ML; MG/3ML
1 SOLUTION RESPIRATORY (INHALATION)
Status: COMPLETED | OUTPATIENT
Start: 2024-11-25 | End: 2024-11-25

## 2024-11-25 RX ORDER — IOPAMIDOL 755 MG/ML
100 INJECTION, SOLUTION INTRAVASCULAR
Status: COMPLETED | OUTPATIENT
Start: 2024-11-25 | End: 2024-11-25

## 2024-11-25 RX ADMIN — IPRATROPIUM BROMIDE AND ALBUTEROL SULFATE 1 DOSE: .5; 3 SOLUTION RESPIRATORY (INHALATION) at 18:16

## 2024-11-25 RX ADMIN — WATER 1000 MG: 1 INJECTION INTRAMUSCULAR; INTRAVENOUS; SUBCUTANEOUS at 18:07

## 2024-11-25 RX ADMIN — FUROSEMIDE 40 MG: 10 INJECTION, SOLUTION INTRAMUSCULAR; INTRAVENOUS at 19:51

## 2024-11-25 RX ADMIN — WATER 125 MG: 1 INJECTION INTRAMUSCULAR; INTRAVENOUS; SUBCUTANEOUS at 19:48

## 2024-11-25 RX ADMIN — IOPAMIDOL 80 ML: 755 INJECTION, SOLUTION INTRAVENOUS at 19:44

## 2024-11-25 ASSESSMENT — ENCOUNTER SYMPTOMS
VOMITING: 0
ABDOMINAL PAIN: 0
NAUSEA: 0
SHORTNESS OF BREATH: 1

## 2024-11-25 NOTE — ED PROVIDER NOTES
EMERGENCY DEPARTMENT HISTORY AND PHYSICAL EXAM      Date: 11/25/2024  Patient Name: Jessica Mane    History of Presenting Illness     Chief Complaint   Patient presents with    Shortness of Breath     Patient brought in by EMS for c/o increased SOB. Patient is on 5 L at baseline.       History Provided By: Patient    HPI: Jessica Mane, 54 y.o. female with a past medical history significant for medical problems as stated below presents to the ED with cc of shortness of breath.  Patient states that over the past 2 days, she has been increasingly short of breath.  She is on 5 L at home.  Was on 6 L for EMS and still had low saturations therefore, patient presented with a nonrebreather.  Patient states that she has had a cough.  Denies any fevers or chills.  States that both of her legs are swollen however, states that this is chronic for her and is not increased from baseline.  Denies any fevers or chills.  Denies any chest pain.  Patient is on Eliquis however, patient states she has not taken any of her medicines except for Dilaudid over the past few days given how poorly she was feeling.    There are no associated symptoms.  No other exacerbating or ameliorating factors.    PCP: No primary care provider on file.    No current facility-administered medications on file prior to encounter.     Current Outpatient Medications on File Prior to Encounter   Medication Sig Dispense Refill    methocarbamol (ROBAXIN) 750 MG tablet Take 1 tablet by mouth 3 times daily as needed (pain or spasm) 30 tablet 0    blood glucose test strips (ASCENSIA AUTODISC VI;ONE TOUCH ULTRA TEST VI) strip 1 each by In Vitro route daily As needed. 100 each 3    Blood Glucose Monitoring Suppl (TRUE METRIX METER) HIPOLITO Use as directed 1 each 0    furosemide (LASIX) 40 MG tablet Take 1 tablet by mouth daily 60 tablet 3    glucose monitoring kit 1 kit by Does not apply route daily 1 kit 0    blood glucose monitor strips Test 3 times a day & as  patch     loratadine 10 MG tablet  Commonly known as: CLARITIN     lovastatin 40 MG tablet  Commonly known as: MEVACOR     methocarbamol 750 MG tablet  Commonly known as: ROBAXIN  Take 1 tablet by mouth 3 times daily as needed (pain or spasm)     metoprolol tartrate 25 MG tablet  Commonly known as: LOPRESSOR     nitroGLYCERIN 0.4 MG SL tablet  Commonly known as: NITROSTAT     NovoLOG FlexPen 100 UNIT/ML injection pen  Generic drug: insulin aspart     omeprazole 40 MG delayed release capsule  Commonly known as: PRILOSEC     predniSONE 20 MG tablet  Commonly known as: DELTASONE     sennosides-docusate sodium 8.6-50 MG tablet  Commonly known as: SENOKOT-S  Take 2 tablets by mouth daily     Trelegy Ellipta 100-62.5-25 MCG/ACT Aepb inhaler  Generic drug: fluticasone-umeclidin-vilant           * This list has 2 medication(s) that are the same as other medications prescribed for you. Read the directions carefully, and ask your doctor or other care provider to review them with you.                2. @[facility]@  3.  Return to ED if worse     Diagnosis     Clinical Impression: No diagnosis found.    Attestations:    Sanna Bautista, DO    Please note that this dictation was completed with [employer], the computer voice recognition software.  Quite often unanticipated grammatical, syntax, homophones, and other interpretive errors are inadvertently transcribed by the computer software.  Please disregard these errors.  Please excuse any errors that have escaped final proofreading.  Thank you.

## 2024-11-26 ENCOUNTER — APPOINTMENT (OUTPATIENT)
Facility: HOSPITAL | Age: 54
DRG: 189 | End: 2024-11-26
Payer: MEDICARE

## 2024-11-26 PROBLEM — J96.01 ACUTE HYPOXIC RESPIRATORY FAILURE: Status: ACTIVE | Noted: 2024-11-26

## 2024-11-26 LAB
ACB COMPLEX DNA BLD POS QL NAA+NON-PROBE: NOT DETECTED
ACCESSION NUMBER, LLC1M: ABNORMAL
APPEARANCE UR: CLEAR
B FRAGILIS DNA BLD POS QL NAA+NON-PROBE: NOT DETECTED
B PERT DNA SPEC QL NAA+PROBE: NOT DETECTED
BACTERIA URNS QL MICRO: NEGATIVE /HPF
BILIRUB UR QL: NEGATIVE
BIOFIRE TEST COMMENT: ABNORMAL
BORDETELLA PARAPERTUSSIS BY PCR: NOT DETECTED
C ALBICANS DNA BLD POS QL NAA+NON-PROBE: NOT DETECTED
C AURIS DNA BLD POS QL NAA+NON-PROBE: NOT DETECTED
C GATTII+NEOFOR DNA BLD POS QL NAA+N-PRB: NOT DETECTED
C GLABRATA DNA BLD POS QL NAA+NON-PROBE: NOT DETECTED
C KRUSEI DNA BLD POS QL NAA+NON-PROBE: NOT DETECTED
C PARAP DNA BLD POS QL NAA+NON-PROBE: NOT DETECTED
C PNEUM DNA SPEC QL NAA+PROBE: NOT DETECTED
C TROPICLS DNA BLD POS QL NAA+NON-PROBE: NOT DETECTED
COLOR UR: ABNORMAL
E CLOAC COMP DNA BLD POS NAA+NON-PROBE: NOT DETECTED
E COLI DNA BLD POS QL NAA+NON-PROBE: NOT DETECTED
E FAECALIS DNA BLD POS QL NAA+NON-PROBE: NOT DETECTED
E FAECIUM DNA BLD POS QL NAA+NON-PROBE: NOT DETECTED
ECHO BSA: 2.71 M2
EKG ATRIAL RATE: 109 BPM
EKG DIAGNOSIS: NORMAL
EKG P AXIS: 75 DEGREES
EKG P-R INTERVAL: 162 MS
EKG Q-T INTERVAL: 292 MS
EKG QRS DURATION: 78 MS
EKG QTC CALCULATION (BAZETT): 393 MS
EKG R AXIS: 105 DEGREES
EKG T AXIS: 86 DEGREES
EKG VENTRICULAR RATE: 109 BPM
ENTEROBACTERALES DNA BLD POS NAA+N-PRB: NOT DETECTED
EPITH CASTS URNS QL MICRO: ABNORMAL /LPF
FLUAV SUBTYP SPEC NAA+PROBE: NOT DETECTED
FLUBV RNA SPEC QL NAA+PROBE: NOT DETECTED
GLUCOSE BLD STRIP.AUTO-MCNC: 214 MG/DL (ref 65–117)
GLUCOSE BLD STRIP.AUTO-MCNC: 219 MG/DL (ref 65–117)
GLUCOSE BLD STRIP.AUTO-MCNC: 219 MG/DL (ref 65–117)
GLUCOSE BLD STRIP.AUTO-MCNC: 287 MG/DL (ref 65–117)
GLUCOSE BLD STRIP.AUTO-MCNC: 382 MG/DL (ref 65–117)
GLUCOSE UR STRIP.AUTO-MCNC: NEGATIVE MG/DL
GP B STREP DNA BLD POS QL NAA+NON-PROBE: NOT DETECTED
HADV DNA SPEC QL NAA+PROBE: NOT DETECTED
HAEM INFLU DNA BLD POS QL NAA+NON-PROBE: NOT DETECTED
HCOV 229E RNA SPEC QL NAA+PROBE: NOT DETECTED
HCOV HKU1 RNA SPEC QL NAA+PROBE: NOT DETECTED
HCOV NL63 RNA SPEC QL NAA+PROBE: NOT DETECTED
HCOV OC43 RNA SPEC QL NAA+PROBE: NOT DETECTED
HGB UR QL STRIP: ABNORMAL
HMPV RNA SPEC QL NAA+PROBE: NOT DETECTED
HPIV1 RNA SPEC QL NAA+PROBE: NOT DETECTED
HPIV2 RNA SPEC QL NAA+PROBE: NOT DETECTED
HPIV3 RNA SPEC QL NAA+PROBE: NOT DETECTED
HPIV4 RNA SPEC QL NAA+PROBE: NOT DETECTED
HYALINE CASTS URNS QL MICRO: ABNORMAL /LPF (ref 0–2)
K OXYTOCA DNA BLD POS QL NAA+NON-PROBE: NOT DETECTED
KETONES UR QL STRIP.AUTO: NEGATIVE MG/DL
KLEBSIELLA SP DNA BLD POS QL NAA+NON-PRB: NOT DETECTED
KLEBSIELLA SP DNA BLD POS QL NAA+NON-PRB: NOT DETECTED
L MONOCYTOG DNA BLD POS QL NAA+NON-PROBE: NOT DETECTED
LEUKOCYTE ESTERASE UR QL STRIP.AUTO: NEGATIVE
M PNEUMO DNA SPEC QL NAA+PROBE: NOT DETECTED
N MEN DNA BLD POS QL NAA+NON-PROBE: NOT DETECTED
NITRITE UR QL STRIP.AUTO: NEGATIVE
P AERUGINOSA DNA BLD POS NAA+NON-PROBE: NOT DETECTED
PH UR STRIP: 6 (ref 5–8)
PROT UR STRIP-MCNC: ABNORMAL MG/DL
PROTEUS SP DNA BLD POS QL NAA+NON-PROBE: NOT DETECTED
RBC #/AREA URNS HPF: ABNORMAL /HPF (ref 0–5)
RESISTANT GENE TARGETS: ABNORMAL
RSV RNA SPEC QL NAA+PROBE: NOT DETECTED
RV+EV RNA SPEC QL NAA+PROBE: NOT DETECTED
S AUREUS DNA BLD POS QL NAA+NON-PROBE: NOT DETECTED
S AUREUS+CONS DNA BLD POS NAA+NON-PROBE: DETECTED
S EPIDERMIDIS DNA BLD POS QL NAA+NON-PRB: NOT DETECTED
S LUGDUNENSIS DNA BLD POS QL NAA+NON-PRB: NOT DETECTED
S MALTOPHILIA DNA BLD POS QL NAA+NON-PRB: NOT DETECTED
S MARCESCENS DNA BLD POS NAA+NON-PROBE: NOT DETECTED
S PNEUM DNA BLD POS QL NAA+NON-PROBE: NOT DETECTED
S PYO DNA BLD POS QL NAA+NON-PROBE: NOT DETECTED
SALMONELLA DNA BLD POS QL NAA+NON-PROBE: NOT DETECTED
SARS-COV-2 RNA RESP QL NAA+PROBE: NOT DETECTED
SERVICE CMNT-IMP: ABNORMAL
SP GR UR REFRACTOMETRY: 1.02
STREPTOCOCCUS DNA BLD POS NAA+NON-PROBE: NOT DETECTED
URINE CULTURE IF INDICATED: ABNORMAL
UROBILINOGEN UR QL STRIP.AUTO: 1 EU/DL (ref 0.2–1)
WBC URNS QL MICRO: ABNORMAL /HPF (ref 0–4)

## 2024-11-26 PROCEDURE — 2500000003 HC RX 250 WO HCPCS: Performed by: INTERNAL MEDICINE

## 2024-11-26 PROCEDURE — 2580000003 HC RX 258: Performed by: INTERNAL MEDICINE

## 2024-11-26 PROCEDURE — 2700000000 HC OXYGEN THERAPY PER DAY

## 2024-11-26 PROCEDURE — 6370000000 HC RX 637 (ALT 250 FOR IP): Performed by: STUDENT IN AN ORGANIZED HEALTH CARE EDUCATION/TRAINING PROGRAM

## 2024-11-26 PROCEDURE — 6360000002 HC RX W HCPCS: Performed by: INTERNAL MEDICINE

## 2024-11-26 PROCEDURE — 82962 GLUCOSE BLOOD TEST: CPT

## 2024-11-26 PROCEDURE — 94640 AIRWAY INHALATION TREATMENT: CPT

## 2024-11-26 PROCEDURE — 6360000002 HC RX W HCPCS: Performed by: STUDENT IN AN ORGANIZED HEALTH CARE EDUCATION/TRAINING PROGRAM

## 2024-11-26 PROCEDURE — 2060000000 HC ICU INTERMEDIATE R&B

## 2024-11-26 PROCEDURE — 76937 US GUIDE VASCULAR ACCESS: CPT

## 2024-11-26 PROCEDURE — 2580000003 HC RX 258: Performed by: STUDENT IN AN ORGANIZED HEALTH CARE EDUCATION/TRAINING PROGRAM

## 2024-11-26 PROCEDURE — 0202U NFCT DS 22 TRGT SARS-COV-2: CPT

## 2024-11-26 PROCEDURE — 93970 EXTREMITY STUDY: CPT

## 2024-11-26 RX ORDER — INSULIN GLARGINE 100 [IU]/ML
40 INJECTION, SOLUTION SUBCUTANEOUS DAILY
Status: DISCONTINUED | OUTPATIENT
Start: 2024-11-26 | End: 2024-11-27

## 2024-11-26 RX ORDER — ASPIRIN 81 MG/1
81 TABLET, CHEWABLE ORAL DAILY
Status: DISCONTINUED | OUTPATIENT
Start: 2024-11-26 | End: 2024-11-30 | Stop reason: HOSPADM

## 2024-11-26 RX ORDER — SODIUM CHLORIDE 0.9 % (FLUSH) 0.9 %
5-40 SYRINGE (ML) INJECTION PRN
Status: DISCONTINUED | OUTPATIENT
Start: 2024-11-26 | End: 2024-11-30 | Stop reason: HOSPADM

## 2024-11-26 RX ORDER — INSULIN LISPRO 100 [IU]/ML
0-4 INJECTION, SOLUTION INTRAVENOUS; SUBCUTANEOUS
Status: DISCONTINUED | OUTPATIENT
Start: 2024-11-26 | End: 2024-11-27

## 2024-11-26 RX ORDER — AMMONIUM LACTATE 12 G/100G
LOTION TOPICAL PRN
Status: DISCONTINUED | OUTPATIENT
Start: 2024-11-26 | End: 2024-11-30 | Stop reason: HOSPADM

## 2024-11-26 RX ORDER — ATORVASTATIN CALCIUM 10 MG/1
10 TABLET, FILM COATED ORAL DAILY
Status: DISCONTINUED | OUTPATIENT
Start: 2024-11-26 | End: 2024-11-30 | Stop reason: HOSPADM

## 2024-11-26 RX ORDER — ACETAMINOPHEN 325 MG/1
650 TABLET ORAL EVERY 6 HOURS PRN
Status: DISCONTINUED | OUTPATIENT
Start: 2024-11-26 | End: 2024-11-30 | Stop reason: HOSPADM

## 2024-11-26 RX ORDER — ARFORMOTEROL TARTRATE 15 UG/2ML
15 SOLUTION RESPIRATORY (INHALATION)
Status: DISCONTINUED | OUTPATIENT
Start: 2024-11-26 | End: 2024-11-30 | Stop reason: HOSPADM

## 2024-11-26 RX ORDER — CETIRIZINE HYDROCHLORIDE 10 MG/1
10 TABLET ORAL DAILY
Status: DISCONTINUED | OUTPATIENT
Start: 2024-11-26 | End: 2024-11-30 | Stop reason: HOSPADM

## 2024-11-26 RX ORDER — IPRATROPIUM BROMIDE AND ALBUTEROL SULFATE 2.5; .5 MG/3ML; MG/3ML
1 SOLUTION RESPIRATORY (INHALATION) EVERY 4 HOURS PRN
Status: DISCONTINUED | OUTPATIENT
Start: 2024-11-26 | End: 2024-11-30

## 2024-11-26 RX ORDER — ANASTROZOLE 1 MG/1
1 TABLET ORAL DAILY
Status: DISCONTINUED | OUTPATIENT
Start: 2024-11-26 | End: 2024-11-30 | Stop reason: HOSPADM

## 2024-11-26 RX ORDER — IPRATROPIUM BROMIDE AND ALBUTEROL SULFATE 2.5; .5 MG/3ML; MG/3ML
1 SOLUTION RESPIRATORY (INHALATION) 4 TIMES DAILY
Status: DISCONTINUED | OUTPATIENT
Start: 2024-11-26 | End: 2024-11-26

## 2024-11-26 RX ORDER — BUDESONIDE 0.25 MG/2ML
0.25 INHALANT ORAL
Status: DISCONTINUED | OUTPATIENT
Start: 2024-11-26 | End: 2024-11-30 | Stop reason: HOSPADM

## 2024-11-26 RX ORDER — ACETAMINOPHEN 650 MG/1
650 SUPPOSITORY RECTAL EVERY 6 HOURS PRN
Status: DISCONTINUED | OUTPATIENT
Start: 2024-11-26 | End: 2024-11-30 | Stop reason: HOSPADM

## 2024-11-26 RX ORDER — DEXTROSE MONOHYDRATE 100 MG/ML
INJECTION, SOLUTION INTRAVENOUS CONTINUOUS PRN
Status: DISCONTINUED | OUTPATIENT
Start: 2024-11-26 | End: 2024-11-30 | Stop reason: HOSPADM

## 2024-11-26 RX ORDER — GLUCAGON 1 MG/ML
1 KIT INJECTION PRN
Status: DISCONTINUED | OUTPATIENT
Start: 2024-11-26 | End: 2024-11-30 | Stop reason: HOSPADM

## 2024-11-26 RX ORDER — SODIUM CHLORIDE 0.9 % (FLUSH) 0.9 %
5-40 SYRINGE (ML) INJECTION EVERY 12 HOURS SCHEDULED
Status: DISCONTINUED | OUTPATIENT
Start: 2024-11-26 | End: 2024-11-30 | Stop reason: HOSPADM

## 2024-11-26 RX ORDER — FLUTICASONE PROPIONATE 50 MCG
2 SPRAY, SUSPENSION (ML) NASAL DAILY PRN
Status: DISCONTINUED | OUTPATIENT
Start: 2024-11-26 | End: 2024-11-30

## 2024-11-26 RX ORDER — PANTOPRAZOLE SODIUM 40 MG/1
40 TABLET, DELAYED RELEASE ORAL
Status: DISCONTINUED | OUTPATIENT
Start: 2024-11-26 | End: 2024-11-30 | Stop reason: HOSPADM

## 2024-11-26 RX ORDER — METOPROLOL TARTRATE 25 MG/1
25 TABLET, FILM COATED ORAL 2 TIMES DAILY
Status: DISCONTINUED | OUTPATIENT
Start: 2024-11-26 | End: 2024-11-30 | Stop reason: HOSPADM

## 2024-11-26 RX ORDER — ONDANSETRON 2 MG/ML
4 INJECTION INTRAMUSCULAR; INTRAVENOUS EVERY 6 HOURS PRN
Status: DISCONTINUED | OUTPATIENT
Start: 2024-11-26 | End: 2024-11-30

## 2024-11-26 RX ORDER — IPRATROPIUM BROMIDE AND ALBUTEROL SULFATE 2.5; .5 MG/3ML; MG/3ML
1 SOLUTION RESPIRATORY (INHALATION)
Status: DISCONTINUED | OUTPATIENT
Start: 2024-11-26 | End: 2024-11-28

## 2024-11-26 RX ORDER — SODIUM CHLORIDE 9 MG/ML
INJECTION, SOLUTION INTRAVENOUS PRN
Status: DISCONTINUED | OUTPATIENT
Start: 2024-11-26 | End: 2024-11-30 | Stop reason: HOSPADM

## 2024-11-26 RX ORDER — FUROSEMIDE 10 MG/ML
40 INJECTION INTRAMUSCULAR; INTRAVENOUS 2 TIMES DAILY
Status: DISCONTINUED | OUTPATIENT
Start: 2024-11-26 | End: 2024-11-30

## 2024-11-26 RX ADMIN — WATER 40 MG: 1 INJECTION INTRAMUSCULAR; INTRAVENOUS; SUBCUTANEOUS at 09:57

## 2024-11-26 RX ADMIN — INSULIN LISPRO 1 UNITS: 100 INJECTION, SOLUTION INTRAVENOUS; SUBCUTANEOUS at 12:21

## 2024-11-26 RX ADMIN — BUDESONIDE 250 MCG: 0.25 INHALANT RESPIRATORY (INHALATION) at 20:16

## 2024-11-26 RX ADMIN — SODIUM CHLORIDE, PRESERVATIVE FREE 10 ML: 5 INJECTION INTRAVENOUS at 21:05

## 2024-11-26 RX ADMIN — AZITHROMYCIN MONOHYDRATE 500 MG: 500 INJECTION, POWDER, LYOPHILIZED, FOR SOLUTION INTRAVENOUS at 10:05

## 2024-11-26 RX ADMIN — BUDESONIDE 250 MCG: 0.25 INHALANT RESPIRATORY (INHALATION) at 07:39

## 2024-11-26 RX ADMIN — INSULIN LISPRO 1 UNITS: 100 INJECTION, SOLUTION INTRAVENOUS; SUBCUTANEOUS at 18:24

## 2024-11-26 RX ADMIN — INSULIN LISPRO 4 UNITS: 100 INJECTION, SOLUTION INTRAVENOUS; SUBCUTANEOUS at 02:17

## 2024-11-26 RX ADMIN — CETIRIZINE HYDROCHLORIDE 10 MG: 10 TABLET, FILM COATED ORAL at 09:57

## 2024-11-26 RX ADMIN — FUROSEMIDE 40 MG: 10 INJECTION, SOLUTION INTRAMUSCULAR; INTRAVENOUS at 09:58

## 2024-11-26 RX ADMIN — METOPROLOL TARTRATE 25 MG: 25 TABLET, FILM COATED ORAL at 21:06

## 2024-11-26 RX ADMIN — APIXABAN 5 MG: 5 TABLET, FILM COATED ORAL at 09:59

## 2024-11-26 RX ADMIN — METOPROLOL TARTRATE 25 MG: 25 TABLET, FILM COATED ORAL at 09:57

## 2024-11-26 RX ADMIN — INSULIN GLARGINE 40 UNITS: 100 INJECTION, SOLUTION SUBCUTANEOUS at 09:56

## 2024-11-26 RX ADMIN — WATER 40 MG: 1 INJECTION INTRAMUSCULAR; INTRAVENOUS; SUBCUTANEOUS at 15:21

## 2024-11-26 RX ADMIN — APIXABAN 5 MG: 5 TABLET, FILM COATED ORAL at 02:21

## 2024-11-26 RX ADMIN — MICONAZOLE NITRATE: 2 POWDER TOPICAL at 15:30

## 2024-11-26 RX ADMIN — ASPIRIN 81 MG: 81 TABLET, CHEWABLE ORAL at 09:57

## 2024-11-26 RX ADMIN — FUROSEMIDE 40 MG: 10 INJECTION, SOLUTION INTRAMUSCULAR; INTRAVENOUS at 18:24

## 2024-11-26 RX ADMIN — APIXABAN 5 MG: 5 TABLET, FILM COATED ORAL at 21:06

## 2024-11-26 RX ADMIN — IPRATROPIUM BROMIDE AND ALBUTEROL SULFATE 1 DOSE: 2.5; .5 SOLUTION RESPIRATORY (INHALATION) at 11:04

## 2024-11-26 RX ADMIN — ARFORMOTEROL TARTRATE 15 MCG: 15 SOLUTION RESPIRATORY (INHALATION) at 07:39

## 2024-11-26 RX ADMIN — IPRATROPIUM BROMIDE AND ALBUTEROL SULFATE 1 DOSE: 2.5; .5 SOLUTION RESPIRATORY (INHALATION) at 20:16

## 2024-11-26 RX ADMIN — INSULIN LISPRO 2 UNITS: 100 INJECTION, SOLUTION INTRAVENOUS; SUBCUTANEOUS at 09:56

## 2024-11-26 RX ADMIN — SODIUM CHLORIDE, PRESERVATIVE FREE 10 ML: 5 INJECTION INTRAVENOUS at 09:58

## 2024-11-26 RX ADMIN — METOPROLOL TARTRATE 25 MG: 25 TABLET, FILM COATED ORAL at 02:21

## 2024-11-26 RX ADMIN — IPRATROPIUM BROMIDE AND ALBUTEROL SULFATE 1 DOSE: 2.5; .5 SOLUTION RESPIRATORY (INHALATION) at 07:33

## 2024-11-26 RX ADMIN — ATORVASTATIN CALCIUM 10 MG: 10 TABLET, FILM COATED ORAL at 09:57

## 2024-11-26 RX ADMIN — ACETAMINOPHEN 650 MG: 325 TABLET ORAL at 21:13

## 2024-11-26 RX ADMIN — ANASTROZOLE 1 MG: 1 TABLET, COATED ORAL at 09:57

## 2024-11-26 RX ADMIN — ARFORMOTEROL TARTRATE 15 MCG: 15 SOLUTION RESPIRATORY (INHALATION) at 20:16

## 2024-11-26 RX ADMIN — IPRATROPIUM BROMIDE AND ALBUTEROL SULFATE 1 DOSE: 2.5; .5 SOLUTION RESPIRATORY (INHALATION) at 15:24

## 2024-11-26 RX ADMIN — INSULIN LISPRO 1 UNITS: 100 INJECTION, SOLUTION INTRAVENOUS; SUBCUTANEOUS at 21:06

## 2024-11-26 ASSESSMENT — PAIN DESCRIPTION - LOCATION: LOCATION: BACK;LEG

## 2024-11-26 ASSESSMENT — PAIN SCALES - GENERAL
PAINLEVEL_OUTOF10: 0
PAINLEVEL_OUTOF10: 7

## 2024-11-26 ASSESSMENT — PAIN DESCRIPTION - ORIENTATION: ORIENTATION: MID;RIGHT;LEFT

## 2024-11-26 ASSESSMENT — PAIN DESCRIPTION - DESCRIPTORS: DESCRIPTORS: ACHING

## 2024-11-26 NOTE — CARE COORDINATION
Care Management Initial Assessment       RUR:  15%   Readmission? No  1st IM letter given? Yes - 11/26/24  1st  letter given: No         11/26/24 0851   Service Assessment   Patient Orientation Alert and Oriented;Person;Place;Situation;Self   Cognition Alert   History Provided By Patient   Primary Caregiver Other (Comment)  (Pt has caregivers through Medicaid 56 hours a wk.)   Patient's Healthcare Decision Maker is: Named in Scanned ACP Document   PCP Verified by CM No  (Pt stated she is working on finding a new PCP.)   Prior Functional Level Assistance with the following:;Bathing;Dressing;Toileting;Feeding;Cooking;Housework;Shopping;Mobility   Current Functional Level Assistance with the following:;Bathing;Dressing;Cooking;Feeding;Toileting;Housework;Shopping;Mobility   Can patient return to prior living arrangement Yes   Ability to make needs known: Fair   Family able to assist with home care needs: Other (comment)  (caregivers)   Would you like for me to discuss the discharge plan with any other family members/significant others, and if so, who? Yes  (Yunior Mane & Flora Painter)   Financial Resources Medicare;Medicaid   Community Resources Other (Comment)  (Medicaid caregivers)   Social/Functional History   Lives With Family  (nephew)   Type of Home Apartment  (one fl 0 win)   Home Layout One level   Home Equipment Oxygen;Hospital bed;Wheelchair - Manual  (6L at baseline through [employer])   Active  No   Discharge Planning   Type of Residence House       CM met with pt at bedside to introduce self/role, verify demographics and complete initial assessment.  Pt lives with her nephew and has Medicaid caregivers 56 hours a wk.  Pt has a supportive son, nephew and sister-in-law.  Pt needs assistance with ADLs/IADLs at baseline.  Pt uses the [facility] pharmacy.  Pt reported having home o2 6L through [employer].  Pt has a hospital bed and w/c.  Pt will need BLS transport at d/c.

## 2024-11-26 NOTE — PROGRESS NOTES
ADULT PROTOCOL: JET AEROSOL ASSESSMENT    Patient  Jessica Mane     54 y.o.   female     11/26/2024  10:01 AM    Breath Sounds Pre Procedure: Breath Sounds Pre-Tx NITO: Diminished                                  Breath Sounds Pre-Tx LLL: Diminished        Breath Sounds Pre-Tx RUL: Diminished        Breath Sounds Pre-Tx RML: Diminished        Breath Sounds Pre-Tx RLL: Diminished  Breath Sounds Post Procedure: Breath Sounds Post-Tx NITO: Diminished          Breath Sounds Post-Tx LLL: Diminished          Breath Sounds Post-Tx RUL: Diminished          Breath Sounds Post-Tx RML: Diminished          Breath Sounds Post-Tx RLL: Diminished                                     Heart Rate: Pre procedure Pre-Tx Pulse: 109           Post procedure Post-Tx Pulse: 101    Resp Rate: Pre procedure Pre-Tx Resps: 16           Post procedure Post-Tx Resps: 20    Oxygen:     nasal cannula     Changed: No    SpO2:  SpO2: 100 %   with Oxygen                Nebulizer Therapy: Current medications Medications: Arformoterol, Budesonide      Changed: No    Smoking History:   Tobacco Use    Smoking status: Former       Current packs/day: 0.25       Types: Cigarettes    Smokeless tobacco: Never    Tobacco comments:       Quit smoking: Patient states \"I  aint smoking no more d*mn cigarettes after yesterday\" 01/26/2018       Problem List:   Patient Active Problem List   Diagnosis    Controlled diabetes mellitus type 2 with complications (HCC)    Pulmonary embolism (HCC)    Acute on chronic respiratory failure    Counseling regarding advance care planning and goals of care    Tachycardia    Chronic pain syndrome    Carcinoma of breast metastatic to multiple sites (HCC)    Acute and chronic respiratory failure with hypoxia    Recurrent bacteremia    Coagulase negative Staphylococcus bacteremia    Aspiration pneumonia of both upper lobes due to gastric secretions (HCC)    Full code status    Palliative care encounter    Hypomagnesemia    Community

## 2024-11-26 NOTE — WOUND CARE
Wound care nurse consult romel BLE extremely dry with vascular changes, MASD under left breast and in skin folds.    55 y/o morbidly obese female admitted for acute hypoxic respiratory failure.   Past Medical History:   Diagnosis Date    Arthritis     Asthma     Breast lump     CAD (coronary artery disease)     Cancer (HCC)     breast cancer    Congestive heart failure (HCC)     COPD (chronic obstructive pulmonary disease) (HCC)     Diabetes (HCC)     Hypertension     Morbid obesity with BMI of 70 and over, adult     NSTEMI (non-ST elevated myocardial infarction) ([facility]) 2012    NSTEMI (non-ST elevated myocardial infarction) ([facility])     SVT (supraventricular tachycardia) ([facility])      Past Surgical History:   Procedure Laterality Date     SECTION      ORTHOPEDIC SURGERY      OTHER SURGICAL HISTORY      cyst removed from back    [place] UNLISTED PROCEDURE CARDIAC SURGERY      Stents    US BREAST BIOPSY W LOC DEVICE 1ST LESION RIGHT Right 2018    US BREAST NEEDLE BIOPSY RIGHT 2018 [facility] RAD US     Body mass index is 56.01 kg/m².  Reference: BMI greater than 30 is classified as obesity and greater than 40 is classified as morbid obesity     Patient refuses Bariatric bed offered.      Patient has dry scaly skin to BLE and feet. There are currently no open wounds. Patient would benefit from some Lac-hydrin lotion.         Will recommend some Micotin antifungal powder for skin folds also.    MILLER GUAJARDO RN

## 2024-11-26 NOTE — PROGRESS NOTES
. End of Shift Note    Bedside shift change report given to JULY Trimble (oncoming nurse) by Hedy Allred RN (offgoing nurse).  Report included the following information SBAR, ED Summary, MAR, Med Rec Status, and Cardiac Rhythm NST    Shift worked:  1900-0730     Shift summary and any significant changes:     Pt admitted to unit at 0315     Concerns for physician to address:  Pt requesting pain medication states she takes multiple narcotics at home. Wound care referral place along with ordering a bariatric bed for pt. Pt refusing turns . Pt bedbound at baseline     Zone phone for oncoming shift:          Activity:  Level of Assistance: Maximum assist, patient does 25-49%  Number times ambulated in hallways past shift: 0  Number of times OOB to chair past shift: 0    Cardiac:   Cardiac Monitoring: Yes      Cardiac Rhythm: Sinus tachy    Access:  Current line(s): PIV     Genitourinary:   Urinary Status: Voiding, External catheter    Respiratory:   O2 Device: Nasal cannula  Chronic home O2 use?: YES  Incentive spirometer at bedside: YES    GI:  Last BM (including prior to admit):  (pt not sure, active bowel sounds)  Current diet:  ADULT DIET; Regular  Passing flatus: YES    Pain Management:   Patient states pain is manageable on current regimen: NO    Skin:  Gold Scale Score: 16  Interventions: Wound Offloading (Prevention Methods): Bed, pressure reduction mattress, Repositioning, Pillows    Patient Safety:  Fall Risk: Nursing Judgement-Fall Risk High(Add Comments): No  Fall Risk Interventions  Nursing Judgement-Fall Risk High(Add Comments): No  Toilet Every 2 Hours-In Advance of Need: Yes  Hourly Visual Checks: Awake, In bed  Fall Visual Posted: Fall sign posted, Socks  Room Door Open: Deferred for droplet isolation  Alarm On: Bed, Chair  Patient Moved Closer to Nursing Station: No    Active Consults:   None    Length of Stay:  Expected LOS: 3  Actual LOS: 0    Hedy Allred RN

## 2024-11-26 NOTE — PROGRESS NOTES
End of Shift Note    Bedside shift change report given to JULY Knott (oncoming nurse) by Nai Hughes RN (offgoing nurse).  Report included the following information SBAR, Intake/Output, Recent Results, and Cardiac Rhythm NSR    Shift worked:  7-7p       Shift summary and any significant changes:     Critical result Bcx: G+ cocci in clusters growing in 1 vial, MD notified  New PIV US guided placed     Concerns for physician to address:  Supplemental oxygen demand, increased from 5L to 7L     Zone phone for oncoming shift:   [phone]       Activity:  Level of Assistance: Maximum assist, patient does 25-49%  Number times ambulated in hallways past shift: 0  Number of times OOB to chair past shift: 0    Cardiac:   Cardiac Monitoring: Yes      Cardiac Rhythm: Sinus tachy    Access:  Current line(s): PIV     Genitourinary:   Urinary Status: Voiding, External catheter    Respiratory:   O2 Device: Nasal cannula  Chronic home O2 use?: YES  Incentive spirometer at bedside: NO    GI:  Last BM (including prior to admit):  (pt not sure, active bowel sounds)  Current diet:  ADULT DIET; Regular  Passing flatus: YES    Pain Management:   Patient states pain is manageable on current regimen: YES    Skin:  Gold Scale Score: 17  Interventions: Wound Offloading (Prevention Methods): Bed, pressure reduction mattress, Pillows, Repositioning    Patient Safety:  Fall Risk: Nursing Judgement-Fall Risk High(Add Comments): No  Fall Risk Interventions  Nursing Judgement-Fall Risk High(Add Comments): No  Toilet Every 2 Hours-In Advance of Need: Yes  Hourly Visual Checks: Awake, In bed  Fall Visual Posted: Socks, Fall sign posted  Room Door Open: Deferred for droplet isolation  Alarm On: Bed  Patient Moved Closer to Nursing Station: Yes    Active Consults:   None    Length of Stay:  Expected LOS: 3  Actual LOS: 0    Nai Hughes RN

## 2024-11-26 NOTE — PLAN OF CARE
Problem: Chronic Conditions and Co-morbidities  Goal: Patient's chronic conditions and co-morbidity symptoms are monitored and maintained or improved  11/26/2024 1205 by Nai Hughes RN  Outcome: Progressing  11/26/2024 0444 by Hedy Allred RN  Outcome: Progressing     Problem: Discharge Planning  Goal: Discharge to home or other facility with appropriate resources  11/26/2024 1205 by Nai Hughes RN  Outcome: Progressing  11/26/2024 0444 by Hedy Allred RN  Outcome: Progressing     Problem: Skin/Tissue Integrity  Goal: Absence of new skin breakdown  Description: 1.  Monitor for areas of redness and/or skin breakdown  2.  Assess vascular access sites hourly  3.  Every 4-6 hours minimum:  Change oxygen saturation probe site  4.  Every 4-6 hours:  If on nasal continuous positive airway pressure, respiratory therapy assess nares and determine need for appliance change or resting period.  11/26/2024 1205 by Nai Hughes RN  Outcome: Progressing  11/26/2024 0444 by Hedy Allred RN  Outcome: Progressing     Problem: Safety - Adult  Goal: Free from fall injury  11/26/2024 1205 by Nai Hughes RN  Outcome: Progressing  11/26/2024 0444 by Hedy Allred RN  Outcome: Progressing     Problem: Respiratory - Adult  Goal: Achieves optimal ventilation and oxygenation  11/26/2024 1205 by Nai Hughes RN  Outcome: Progressing  11/26/2024 1001 by Kash Kenney, RT  Outcome: Progressing

## 2024-11-26 NOTE — H&P
work up and further evaluation.     Patient presents with chief complaint of shortness of breath. Notes has had several days of worsening shortness of breath with associated cough. States that cough has been productive of sputum which she describes as thick in nature and clear to yellow in color, states this is not typical for her. No fevers or chills. States is typically on 5L NC oxygen at home, notes this has not changed over the past several days. Notes that has not been taking her medications for several days due to not feeling well at home.     Past Medical History:   Diagnosis Date    Arthritis     Asthma     Breast lump     CAD (coronary artery disease)     Cancer (HCC)     breast cancer    Congestive heart failure (HCC)     COPD (chronic obstructive pulmonary disease) (HCC)     Diabetes (HCC)     Hypertension     Morbid obesity with BMI of 70 and over, adult     NSTEMI (non-ST elevated myocardial infarction) ([facility]) 2012    NSTEMI (non-ST elevated myocardial infarction) ([facility])     SVT (supraventricular tachycardia) ([facility])         Past Surgical History:   Procedure Laterality Date     SECTION      ORTHOPEDIC SURGERY      OTHER SURGICAL HISTORY      cyst removed from back    GA UNLISTED PROCEDURE CARDIAC SURGERY      Stents    US BREAST BIOPSY W LOC DEVICE 1ST LESION RIGHT Right 2018    US BREAST NEEDLE BIOPSY RIGHT 2018 [facility] RAD US       Social History     Tobacco Use    Smoking status: Former     Current packs/day: 0.25     Types: Cigarettes    Smokeless tobacco: Never    Tobacco comments:     Quit smoking: Patient states \"I  aint smoking no more d*mn cigarettes after yesterday\" 2018   Substance Use Topics    Alcohol use: No        Family History   Problem Relation Age of Onset    Heart Disease Mother     Heart Disease Father     Heart Disease Sister     Breast Cancer Maternal Grandmother     Breast Cancer Paternal Grandmother        No Known Allergies     Prior to Admission  Sensitivity 54 (H) 0 - 51 ng/L   Urinalysis with Reflex to Culture    Collection Time: 11/25/24 11:00 PM    Specimen: Urine   Result Value Ref Range    Color, UA YELLOW/STRAW      Appearance CLEAR CLEAR      Specific Gravity, UA 1.016      pH, Urine 6.0 5.0 - 8.0      Protein, UA TRACE (A) NEG mg/dL    Glucose, Ur Negative NEG mg/dL    Ketones, Urine Negative NEG mg/dL    Bilirubin, Urine Negative NEG      Blood, Urine MODERATE (A) NEG      Urobilinogen, Urine 1.0 0.2 - 1.0 EU/dL    Nitrite, Urine Negative NEG      Leukocyte Esterase, Urine Negative NEG      WBC, UA 0-4 0 - 4 /hpf    RBC, UA 20-50 0 - 5 /hpf    Epithelial Cells, UA FEW FEW /lpf    BACTERIA, URINE Negative NEG /hpf    Urine Culture if Indicated CULTURE NOT INDICATED BY UA RESULT CNI      Hyaline Casts, UA 0-2 0 - 2 /lpf         CTA CHEST W WO CONTRAST PE Eval    Result Date: 11/25/2024  EXAM:  CTA CHEST W WO CONTRAST INDICATION: PE COMPARISON: 10/16/2023 TECHNIQUE: Helical thin section chest CT following intravenous administration of nonionic contrast 100 mL of isovue 370 according to departmental PE protocol. Coronal and sagittal reformats were performed. 3D post processing was performed.  CT dose reduction was achieved through the use of a standardized protocol tailored for this examination and automatic exposure control for dose modulation. FINDINGS: This is a limited quality study for the evaluation of pulmonary embolism to the distal lobar arterial level. There is no pulmonary embolism to this level. 2.7 cm possible right breast mass. Nodular densities in the right breast more inferiorly incompletely evaluated MEDIASTINUM: No mass or lymphadenopathy. AASHISH: No mass or lymphadenopathy. THORACIC AORTA: No aneurysm. HEART: Cardiomegaly ESOPHAGUS: No wall thickening or dilatation. TRACHEA/BRONCHI: Narrowing of the left upper lobe bronchus PLEURA: No effusion or pneumothorax. LUNGS: Volume loss in the lingula and left lower lobe UPPER ABDOMEN:

## 2024-11-27 LAB
ALBUMIN SERPL-MCNC: 2.8 G/DL (ref 3.5–5)
ALBUMIN/GLOB SERPL: 0.7 (ref 1.1–2.2)
ALP SERPL-CCNC: 236 U/L (ref 45–117)
ALT SERPL-CCNC: 18 U/L (ref 12–78)
ANION GAP SERPL CALC-SCNC: 1 MMOL/L (ref 2–12)
AST SERPL-CCNC: 9 U/L (ref 15–37)
BASOPHILS # BLD: 0 K/UL (ref 0–0.1)
BASOPHILS NFR BLD: 0 % (ref 0–1)
BILIRUB SERPL-MCNC: 0.5 MG/DL (ref 0.2–1)
BUN SERPL-MCNC: 20 MG/DL (ref 6–20)
BUN/CREAT SERPL: 26 (ref 12–20)
CALCIUM SERPL-MCNC: 11.7 MG/DL (ref 8.5–10.1)
CHLORIDE SERPL-SCNC: 96 MMOL/L (ref 97–108)
CO2 SERPL-SCNC: 41 MMOL/L (ref 21–32)
CREAT SERPL-MCNC: 0.78 MG/DL (ref 0.55–1.02)
DIFFERENTIAL METHOD BLD: ABNORMAL
EOSINOPHIL # BLD: 0 K/UL (ref 0–0.4)
EOSINOPHIL NFR BLD: 0 % (ref 0–7)
ERYTHROCYTE [DISTWIDTH] IN BLOOD BY AUTOMATED COUNT: 12.7 % (ref 11.5–14.5)
GLOBULIN SER CALC-MCNC: 4 G/DL (ref 2–4)
GLUCOSE BLD STRIP.AUTO-MCNC: 216 MG/DL (ref 65–117)
GLUCOSE BLD STRIP.AUTO-MCNC: 260 MG/DL (ref 65–117)
GLUCOSE BLD STRIP.AUTO-MCNC: 290 MG/DL (ref 65–117)
GLUCOSE BLD STRIP.AUTO-MCNC: 293 MG/DL (ref 65–117)
GLUCOSE SERPL-MCNC: 235 MG/DL (ref 65–100)
HCT VFR BLD AUTO: 46.3 % (ref 35–47)
HGB BLD-MCNC: 13.4 G/DL (ref 11.5–16)
IMM GRANULOCYTES # BLD AUTO: 0.1 K/UL (ref 0–0.04)
IMM GRANULOCYTES NFR BLD AUTO: 1 % (ref 0–0.5)
LYMPHOCYTES # BLD: 0.4 K/UL (ref 0.8–3.5)
LYMPHOCYTES NFR BLD: 3 % (ref 12–49)
MCH RBC QN AUTO: 28.9 PG (ref 26–34)
MCHC RBC AUTO-ENTMCNC: 28.9 G/DL (ref 30–36.5)
MCV RBC AUTO: 99.8 FL (ref 80–99)
MONOCYTES # BLD: 0.5 K/UL (ref 0–1)
MONOCYTES NFR BLD: 4 % (ref 5–13)
NEUTS SEG # BLD: 12.3 K/UL (ref 1.8–8)
NEUTS SEG NFR BLD: 92 % (ref 32–75)
NRBC # BLD: 0 K/UL (ref 0–0.01)
NRBC BLD-RTO: 0 PER 100 WBC
PLATELET # BLD AUTO: 160 K/UL (ref 150–400)
PMV BLD AUTO: 10.5 FL (ref 8.9–12.9)
POTASSIUM SERPL-SCNC: 4.1 MMOL/L (ref 3.5–5.1)
PROT SERPL-MCNC: 6.8 G/DL (ref 6.4–8.2)
RBC # BLD AUTO: 4.64 M/UL (ref 3.8–5.2)
RBC MORPH BLD: ABNORMAL
SERVICE CMNT-IMP: ABNORMAL
SODIUM SERPL-SCNC: 138 MMOL/L (ref 136–145)
WBC # BLD AUTO: 13.3 K/UL (ref 3.6–11)

## 2024-11-27 PROCEDURE — 2580000003 HC RX 258: Performed by: INTERNAL MEDICINE

## 2024-11-27 PROCEDURE — 6370000000 HC RX 637 (ALT 250 FOR IP): Performed by: STUDENT IN AN ORGANIZED HEALTH CARE EDUCATION/TRAINING PROGRAM

## 2024-11-27 PROCEDURE — 2580000003 HC RX 258: Performed by: STUDENT IN AN ORGANIZED HEALTH CARE EDUCATION/TRAINING PROGRAM

## 2024-11-27 PROCEDURE — 87040 BLOOD CULTURE FOR BACTERIA: CPT

## 2024-11-27 PROCEDURE — 82962 GLUCOSE BLOOD TEST: CPT

## 2024-11-27 PROCEDURE — 6360000002 HC RX W HCPCS: Performed by: STUDENT IN AN ORGANIZED HEALTH CARE EDUCATION/TRAINING PROGRAM

## 2024-11-27 PROCEDURE — 36415 COLL VENOUS BLD VENIPUNCTURE: CPT

## 2024-11-27 PROCEDURE — 2060000000 HC ICU INTERMEDIATE R&B

## 2024-11-27 PROCEDURE — 6360000002 HC RX W HCPCS: Performed by: INTERNAL MEDICINE

## 2024-11-27 PROCEDURE — 2700000000 HC OXYGEN THERAPY PER DAY

## 2024-11-27 PROCEDURE — 94640 AIRWAY INHALATION TREATMENT: CPT

## 2024-11-27 PROCEDURE — 80053 COMPREHEN METABOLIC PANEL: CPT

## 2024-11-27 PROCEDURE — 6370000000 HC RX 637 (ALT 250 FOR IP): Performed by: INTERNAL MEDICINE

## 2024-11-27 PROCEDURE — 85025 COMPLETE CBC W/AUTO DIFF WBC: CPT

## 2024-11-27 RX ORDER — LIDOCAINE 4 G/G
1 PATCH TOPICAL EVERY 24 HOURS
Status: DISCONTINUED | OUTPATIENT
Start: 2024-11-27 | End: 2024-11-30 | Stop reason: HOSPADM

## 2024-11-27 RX ORDER — HYDROXYZINE HYDROCHLORIDE 25 MG/1
25 TABLET, FILM COATED ORAL EVERY 6 HOURS PRN
Status: DISCONTINUED | OUTPATIENT
Start: 2024-11-27 | End: 2024-11-30

## 2024-11-27 RX ORDER — INSULIN LISPRO 100 [IU]/ML
0-16 INJECTION, SOLUTION INTRAVENOUS; SUBCUTANEOUS
Status: DISCONTINUED | OUTPATIENT
Start: 2024-11-27 | End: 2024-11-30 | Stop reason: HOSPADM

## 2024-11-27 RX ORDER — METHOCARBAMOL 750 MG/1
750 TABLET, FILM COATED ORAL 3 TIMES DAILY PRN
Status: DISCONTINUED | OUTPATIENT
Start: 2024-11-27 | End: 2024-11-30 | Stop reason: HOSPADM

## 2024-11-27 RX ORDER — INSULIN GLARGINE 100 [IU]/ML
45 INJECTION, SOLUTION SUBCUTANEOUS DAILY
Status: DISCONTINUED | OUTPATIENT
Start: 2024-11-28 | End: 2024-11-30 | Stop reason: HOSPADM

## 2024-11-27 RX ORDER — OXYCODONE HYDROCHLORIDE 5 MG/1
5 TABLET ORAL EVERY 6 HOURS PRN
Status: DISPENSED | OUTPATIENT
Start: 2024-11-27 | End: 2024-11-28

## 2024-11-27 RX ADMIN — SODIUM CHLORIDE, PRESERVATIVE FREE 10 ML: 5 INJECTION INTRAVENOUS at 21:02

## 2024-11-27 RX ADMIN — AZITHROMYCIN MONOHYDRATE 500 MG: 500 INJECTION, POWDER, LYOPHILIZED, FOR SOLUTION INTRAVENOUS at 09:38

## 2024-11-27 RX ADMIN — FUROSEMIDE 40 MG: 10 INJECTION, SOLUTION INTRAMUSCULAR; INTRAVENOUS at 09:19

## 2024-11-27 RX ADMIN — METOPROLOL TARTRATE 25 MG: 25 TABLET, FILM COATED ORAL at 09:19

## 2024-11-27 RX ADMIN — HYDROXYZINE HYDROCHLORIDE 25 MG: 25 TABLET ORAL at 05:30

## 2024-11-27 RX ADMIN — WATER 40 MG: 1 INJECTION INTRAMUSCULAR; INTRAVENOUS; SUBCUTANEOUS at 01:11

## 2024-11-27 RX ADMIN — PANTOPRAZOLE SODIUM 40 MG: 40 TABLET, DELAYED RELEASE ORAL at 05:30

## 2024-11-27 RX ADMIN — ANASTROZOLE 1 MG: 1 TABLET, COATED ORAL at 09:19

## 2024-11-27 RX ADMIN — Medication: at 09:21

## 2024-11-27 RX ADMIN — METOPROLOL TARTRATE 25 MG: 25 TABLET, FILM COATED ORAL at 21:00

## 2024-11-27 RX ADMIN — ARFORMOTEROL TARTRATE 15 MCG: 15 SOLUTION RESPIRATORY (INHALATION) at 08:41

## 2024-11-27 RX ADMIN — APIXABAN 5 MG: 5 TABLET, FILM COATED ORAL at 21:01

## 2024-11-27 RX ADMIN — ASPIRIN 81 MG: 81 TABLET, CHEWABLE ORAL at 09:19

## 2024-11-27 RX ADMIN — WATER 40 MG: 1 INJECTION INTRAMUSCULAR; INTRAVENOUS; SUBCUTANEOUS at 09:19

## 2024-11-27 RX ADMIN — IPRATROPIUM BROMIDE AND ALBUTEROL SULFATE 1 DOSE: 2.5; .5 SOLUTION RESPIRATORY (INHALATION) at 08:36

## 2024-11-27 RX ADMIN — IPRATROPIUM BROMIDE AND ALBUTEROL SULFATE 1 DOSE: 2.5; .5 SOLUTION RESPIRATORY (INHALATION) at 15:09

## 2024-11-27 RX ADMIN — FUROSEMIDE 40 MG: 10 INJECTION, SOLUTION INTRAMUSCULAR; INTRAVENOUS at 17:46

## 2024-11-27 RX ADMIN — ATORVASTATIN CALCIUM 10 MG: 10 TABLET, FILM COATED ORAL at 09:19

## 2024-11-27 RX ADMIN — MICONAZOLE NITRATE: 2 POWDER TOPICAL at 20:53

## 2024-11-27 RX ADMIN — INSULIN LISPRO 1 UNITS: 100 INJECTION, SOLUTION INTRAVENOUS; SUBCUTANEOUS at 09:20

## 2024-11-27 RX ADMIN — WATER 40 MG: 1 INJECTION INTRAMUSCULAR; INTRAVENOUS; SUBCUTANEOUS at 17:36

## 2024-11-27 RX ADMIN — INSULIN GLARGINE 40 UNITS: 100 INJECTION, SOLUTION SUBCUTANEOUS at 09:19

## 2024-11-27 RX ADMIN — INSULIN LISPRO 8 UNITS: 100 INJECTION, SOLUTION INTRAVENOUS; SUBCUTANEOUS at 21:01

## 2024-11-27 RX ADMIN — Medication: at 20:52

## 2024-11-27 RX ADMIN — OXYCODONE 5 MG: 5 TABLET ORAL at 21:11

## 2024-11-27 RX ADMIN — IPRATROPIUM BROMIDE AND ALBUTEROL SULFATE 1 DOSE: 2.5; .5 SOLUTION RESPIRATORY (INHALATION) at 11:50

## 2024-11-27 RX ADMIN — APIXABAN 5 MG: 5 TABLET, FILM COATED ORAL at 09:19

## 2024-11-27 RX ADMIN — INSULIN LISPRO 8 UNITS: 100 INJECTION, SOLUTION INTRAVENOUS; SUBCUTANEOUS at 17:36

## 2024-11-27 RX ADMIN — INSULIN LISPRO 2 UNITS: 100 INJECTION, SOLUTION INTRAVENOUS; SUBCUTANEOUS at 12:42

## 2024-11-27 RX ADMIN — BUDESONIDE 250 MCG: 0.25 INHALANT RESPIRATORY (INHALATION) at 08:41

## 2024-11-27 RX ADMIN — CETIRIZINE HYDROCHLORIDE 10 MG: 10 TABLET, FILM COATED ORAL at 09:19

## 2024-11-27 RX ADMIN — MICONAZOLE NITRATE: 2 POWDER TOPICAL at 09:38

## 2024-11-27 RX ADMIN — SODIUM CHLORIDE, PRESERVATIVE FREE 10 ML: 5 INJECTION INTRAVENOUS at 09:38

## 2024-11-27 ASSESSMENT — PAIN SCALES - GENERAL
PAINLEVEL_OUTOF10: 5
PAINLEVEL_OUTOF10: 7

## 2024-11-27 ASSESSMENT — PAIN DESCRIPTION - ORIENTATION: ORIENTATION: LEFT

## 2024-11-27 ASSESSMENT — PAIN DESCRIPTION - LOCATION
LOCATION: BACK
LOCATION: BACK

## 2024-11-27 ASSESSMENT — PAIN DESCRIPTION - DESCRIPTORS: DESCRIPTORS: ACHING;SPASM

## 2024-11-27 NOTE — PROGRESS NOTES
End of Shift Note    Bedside shift change report given to JULY Sweeney (oncoming nurse) by Hedy Allred RN (offgoing nurse).  Report included the following information SBAR, Intake/Output, MAR, Recent Results, and Med Rec Status    Shift worked:  8117-2651     Shift summary and any significant changes:     Pt became very agitated about not having pain medication besides Tylenol. Message sent to night JENNIFER without response. Pt became anxious after solumedrol injection. Received order for PRN atarax  for anxiety  PT continues to refuse repositioning and turning.   Concerns for physician to address:  Pain management      Zone phone for oncoming shift:          Activity:  Level of Assistance: Maximum assist, patient does 25-49%  Number times ambulated in hallways past shift: 0  Number of times OOB to chair past shift: 0    Cardiac:   Cardiac Monitoring: Yes      Cardiac Rhythm: Sinus rhythm    Access:  Current line(s): PIV     Genitourinary:   Urinary Status: Voiding, External catheter    Respiratory:   O2 Device: Nasal cannula  Chronic home O2 use?: NO  Incentive spirometer at bedside: NO    GI:  Last BM (including prior to admit): 11/25/24  Current diet:  ADULT DIET; Regular  Passing flatus: YES    Pain Management:   Patient states pain is manageable on current regimen: YES    Skin:  Gold Scale Score: 17  Interventions: Wound Offloading (Prevention Methods): Bed, pressure reduction mattress, Pillows, Repositioning    Patient Safety:  Fall Risk: Nursing Judgement-Fall Risk High(Add Comments): No  Fall Risk Interventions  Nursing Judgement-Fall Risk High(Add Comments): No  Toilet Every 2 Hours-In Advance of Need: Yes  Hourly Visual Checks: Agitated  Fall Visual Posted: Fall sign posted  Room Door Open: No (Comment)  Alarm On: Bed  Patient Moved Closer to Nursing Station: No    Active Consults:   None    Length of Stay:  Expected LOS: 3  Actual LOS: 1    Hedy Allred RN

## 2024-11-27 NOTE — PLAN OF CARE
Problem: Respiratory - Adult  Goal: Achieves optimal ventilation and oxygenation  11/26/2024 2022 by Willa Rodriguez RCP  Outcome: Progressing  11/26/2024 2001 by Hedy Allred, RN  Outcome: Progressing  11/26/2024 1205 by Nai Hughes RN  Outcome: Progressing  11/26/2024 1001 by Kash Kenney, RT  Outcome: Progressing

## 2024-11-27 NOTE — PROGRESS NOTES
ADULT PROTOCOL: JET AEROSOL ASSESSMENT    Patient  Jessica Mane     54 y.o.   female     11/27/2024  11:53 AM    Breath Sounds Pre Procedure: Breath Sounds Pre-Tx NITO: Diminished                                  Breath Sounds Pre-Tx LLL: Diminished        Breath Sounds Pre-Tx RUL: Diminished        Breath Sounds Pre-Tx RML: Diminished        Breath Sounds Pre-Tx RLL: Diminished  Breath Sounds Post Procedure: Breath Sounds Post-Tx NITO: Diminished          Breath Sounds Post-Tx LLL: Diminished          Breath Sounds Post-Tx RUL: Diminished          Breath Sounds Post-Tx RML: Diminished          Breath Sounds Post-Tx RLL: Diminished                                       Heart Rate: Pre procedure Pre-Tx Pulse: 75           Post procedure Post-Tx Pulse: 84    Resp Rate: Pre procedure Pre-Tx Resps: 18           Post procedure Post-Tx Resps: 19    Oxygen: O2 Therapy: Oxygen humidified   nasal cannula     Changed: No    SpO2:  SpO2: 95 %   with Oxygen                Nebulizer Therapy: Current medications Medications: Albuterol/Ipratropium      Changed: No      Problem List:   Patient Active Problem List   Diagnosis    Controlled diabetes mellitus type 2 with complications (HCC)    Pulmonary embolism (HCC)    Acute on chronic respiratory failure    Counseling regarding advance care planning and goals of care    Tachycardia    Chronic pain syndrome    Carcinoma of breast metastatic to multiple sites (HCC)    Acute and chronic respiratory failure with hypoxia    Recurrent bacteremia    Coagulase negative Staphylococcus bacteremia    Aspiration pneumonia of both upper lobes due to gastric secretions (HCC)    Full code status    Palliative care encounter    Hypomagnesemia    Community acquired pneumonia of left lung, unspecified part of lung    Acute hypoxic respiratory failure       Respiratory Therapist: Moon Miles RCP  ADULT PROTOCOL: JET AEROSOL ASSESSMENT    Patient  Jessica Mane     54 y.o.   female     11/27/2024   11:53 AM    Breath Sounds Pre Procedure: Breath Sounds Pre-Tx NITO: Diminished                                  Breath Sounds Pre-Tx LLL: Diminished        Breath Sounds Pre-Tx RUL: Diminished        Breath Sounds Pre-Tx RML: Diminished        Breath Sounds Pre-Tx RLL: Diminished  Breath Sounds Post Procedure: Breath Sounds Post-Tx NITO: Diminished          Breath Sounds Post-Tx LLL: Diminished          Breath Sounds Post-Tx RUL: Diminished          Breath Sounds Post-Tx RML: Diminished          Breath Sounds Post-Tx RLL: Diminished

## 2024-11-27 NOTE — PROGRESS NOTES
0805 Critical lab result CO2 41, attending provider notified    0930 Critical lab result Staph species, coagulase negative, multiple colonies growing in one of the tubes LAC    1030 phlebotomy team not able to draw repeat blood cultures    1120 PICC team obtaining blood cultures     1530 emptied the external catheter canister, urin appears red

## 2024-11-27 NOTE — PROGRESS NOTES
..End of Shift Note    Bedside shift change report given to JULY Knott (oncoming nurse) by Oracio Gipson RN (offgoing nurse).  Report included the following information SBAR    Shift worked:  0700 - 1900     Shift summary and any significant changes:    Turned over with JULY Trimble at 1540.  Pt. Resting comfortably in the bed.  No c/o pain or distress.  No issues noted for shift.     Concerns for physician to address: No     Zone phone for oncoming shift:  No       Activity:  Level of Assistance: Maximum assist, patient does 25-49%  Number times ambulated in hallways past shift: 0  Number of times OOB to chair past shift: 0    Cardiac:   Cardiac Monitoring: Yes      Cardiac Rhythm: Sinus rhythm    Access:  Current line(s): PIV     Genitourinary:   Urinary Status: Voiding, External catheter    Respiratory:   O2 Device: Nasal cannula  Chronic home O2 use?: NO  Incentive spirometer at bedside: NO    GI:  Last BM (including prior to admit): 11/25/24  Current diet:  ADULT DIET; Regular  Passing flatus: YES    Pain Management:   Patient states pain is manageable on current regimen: YES    Skin:  Gold Scale Score: 17  Interventions: Wound Offloading (Prevention Methods): Bed, pressure reduction mattress, Pillows, Repositioning    Patient Safety:  Fall Risk: Nursing Judgement-Fall Risk High(Add Comments): No  Fall Risk Interventions  Nursing Judgement-Fall Risk High(Add Comments): No  Toilet Every 2 Hours-In Advance of Need: Yes  Hourly Visual Checks: Awake  Fall Visual Posted: Fall sign posted  Room Door Open: Yes  Alarm On: Bed  Patient Moved Closer to Nursing Station: No    Active Consults:   IP CONSULT TO UROLOGY    Length of Stay:  Expected LOS: 3  Actual LOS: 1    Oracio Gipson RN

## 2024-11-27 NOTE — CARE COORDINATION
CM Note:  Patient was with [facility]. Rep name is Jayna Guzman [phone]. I asked the doctor if he could put in for resumption of care for Hospice so I can place referral. She has 5 lpm 02 at home . I asked Hospice if they can increase it to  10 lpm as she has been 6 - 7 lpm here. Patient is in agreement to all of above.     English Lawrence MCDOWELL CM   [id number]

## 2024-11-27 NOTE — PLAN OF CARE
Problem: Chronic Conditions and Co-morbidities  Goal: Patient's chronic conditions and co-morbidity symptoms are monitored and maintained or improved  11/26/2024 2001 by Hedy Allred RN  Outcome: Progressing  11/26/2024 1205 by Nai Hughes RN  Outcome: Progressing     Problem: Discharge Planning  Goal: Discharge to home or other facility with appropriate resources  11/26/2024 2001 by Hedy Allred RN  Outcome: Progressing  11/26/2024 1205 by Nai Hughes RN  Outcome: Progressing     Problem: Skin/Tissue Integrity  Goal: Absence of new skin breakdown  Description: 1.  Monitor for areas of redness and/or skin breakdown  2.  Assess vascular access sites hourly  3.  Every 4-6 hours minimum:  Change oxygen saturation probe site  4.  Every 4-6 hours:  If on nasal continuous positive airway pressure, respiratory therapy assess nares and determine need for appliance change or resting period.  11/26/2024 2001 by Hedy Allred RN  Outcome: Progressing  11/26/2024 1205 by Nai Hughes RN  Outcome: Progressing     Problem: Safety - Adult  Goal: Free from fall injury  11/26/2024 2001 by Hedy Allred RN  Outcome: Progressing  11/26/2024 1205 by Nai Hughes RN  Outcome: Progressing     Problem: Respiratory - Adult  Goal: Achieves optimal ventilation and oxygenation  11/26/2024 2001 by Hedy Allred RN  Outcome: Progressing  11/26/2024 1205 by Nai Hughes RN  Outcome: Progressing  11/26/2024 1001 by Kash Kenney, RT  Outcome: Progressing

## 2024-11-27 NOTE — PLAN OF CARE
Problem: Chronic Conditions and Co-morbidities  Goal: Patient's chronic conditions and co-morbidity symptoms are monitored and maintained or improved  Outcome: Progressing     Problem: Discharge Planning  Goal: Discharge to home or other facility with appropriate resources  Outcome: Progressing     Problem: Skin/Tissue Integrity  Goal: Absence of new skin breakdown  Description: 1.  Monitor for areas of redness and/or skin breakdown  2.  Assess vascular access sites hourly  3.  Every 4-6 hours minimum:  Change oxygen saturation probe site  4.  Every 4-6 hours:  If on nasal continuous positive airway pressure, respiratory therapy assess nares and determine need for appliance change or resting period.  Outcome: Progressing     Problem: Safety - Adult  Goal: Free from fall injury  Outcome: Progressing     Problem: Respiratory - Adult  Goal: Achieves optimal ventilation and oxygenation  Outcome: Progressing     Problem: Pain  Goal: Verbalizes/displays adequate comfort level or baseline comfort level  Outcome: Progressing

## 2024-11-28 LAB
ALBUMIN SERPL-MCNC: 2.6 G/DL (ref 3.5–5)
ALBUMIN/GLOB SERPL: 0.5 (ref 1.1–2.2)
ALP SERPL-CCNC: 244 U/L (ref 45–117)
ALT SERPL-CCNC: 29 U/L (ref 12–78)
ANION GAP SERPL CALC-SCNC: 1 MMOL/L (ref 2–12)
APPEARANCE UR: ABNORMAL
AST SERPL-CCNC: ABNORMAL U/L (ref 15–37)
BACTERIA URNS QL MICRO: ABNORMAL /HPF
BASOPHILS # BLD: 0 K/UL (ref 0–0.1)
BASOPHILS NFR BLD: 0 % (ref 0–1)
BILIRUB SERPL-MCNC: 1.2 MG/DL (ref 0.2–1)
BILIRUB UR QL: NEGATIVE
BUN SERPL-MCNC: 25 MG/DL (ref 6–20)
BUN/CREAT SERPL: 32 (ref 12–20)
CALCIUM SERPL-MCNC: 11.1 MG/DL (ref 8.5–10.1)
CHLORIDE SERPL-SCNC: 94 MMOL/L (ref 97–108)
CO2 SERPL-SCNC: 37 MMOL/L (ref 21–32)
COLOR UR: ABNORMAL
CREAT SERPL-MCNC: 0.78 MG/DL (ref 0.55–1.02)
DIFFERENTIAL METHOD BLD: ABNORMAL
EOSINOPHIL # BLD: 0 K/UL (ref 0–0.4)
EOSINOPHIL NFR BLD: 0 % (ref 0–7)
EPITH CASTS URNS QL MICRO: ABNORMAL /LPF
ERYTHROCYTE [DISTWIDTH] IN BLOOD BY AUTOMATED COUNT: 12.5 % (ref 11.5–14.5)
GLOBULIN SER CALC-MCNC: 4.9 G/DL (ref 2–4)
GLUCOSE BLD STRIP.AUTO-MCNC: 255 MG/DL (ref 65–117)
GLUCOSE BLD STRIP.AUTO-MCNC: 288 MG/DL (ref 65–117)
GLUCOSE BLD STRIP.AUTO-MCNC: 297 MG/DL (ref 65–117)
GLUCOSE BLD STRIP.AUTO-MCNC: 337 MG/DL (ref 65–117)
GLUCOSE BLD STRIP.AUTO-MCNC: 339 MG/DL (ref 65–117)
GLUCOSE SERPL-MCNC: 210 MG/DL (ref 65–100)
GLUCOSE UR STRIP.AUTO-MCNC: NEGATIVE MG/DL
HCT VFR BLD AUTO: 51.2 % (ref 35–47)
HGB BLD-MCNC: 14.8 G/DL (ref 11.5–16)
HGB UR QL STRIP: ABNORMAL
IMM GRANULOCYTES # BLD AUTO: 0.1 K/UL (ref 0–0.04)
IMM GRANULOCYTES NFR BLD AUTO: 1 % (ref 0–0.5)
KETONES UR QL STRIP.AUTO: NEGATIVE MG/DL
LEUKOCYTE ESTERASE UR QL STRIP.AUTO: ABNORMAL
LYMPHOCYTES # BLD: 0.5 K/UL (ref 0.8–3.5)
LYMPHOCYTES NFR BLD: 4 % (ref 12–49)
MCH RBC QN AUTO: 28.8 PG (ref 26–34)
MCHC RBC AUTO-ENTMCNC: 28.9 G/DL (ref 30–36.5)
MCV RBC AUTO: 99.6 FL (ref 80–99)
MONOCYTES # BLD: 0.6 K/UL (ref 0–1)
MONOCYTES NFR BLD: 5 % (ref 5–13)
MUCOUS THREADS URNS QL MICRO: ABNORMAL /LPF
NEUTS SEG # BLD: 11.3 K/UL (ref 1.8–8)
NEUTS SEG NFR BLD: 90 % (ref 32–75)
NITRITE UR QL STRIP.AUTO: NEGATIVE
NRBC # BLD: 0 K/UL (ref 0–0.01)
NRBC BLD-RTO: 0 PER 100 WBC
PH UR STRIP: 6 (ref 5–8)
PLATELET # BLD AUTO: 176 K/UL (ref 150–400)
PMV BLD AUTO: 10.3 FL (ref 8.9–12.9)
POTASSIUM SERPL-SCNC: ABNORMAL MMOL/L (ref 3.5–5.1)
PROT SERPL-MCNC: 7.5 G/DL (ref 6.4–8.2)
PROT UR STRIP-MCNC: 100 MG/DL
RBC # BLD AUTO: 5.14 M/UL (ref 3.8–5.2)
RBC #/AREA URNS HPF: >100 /HPF (ref 0–5)
RBC MORPH BLD: ABNORMAL
SERVICE CMNT-IMP: ABNORMAL
SODIUM SERPL-SCNC: 132 MMOL/L (ref 136–145)
SP GR UR REFRACTOMETRY: 1.02
URINE CULTURE IF INDICATED: ABNORMAL
UROBILINOGEN UR QL STRIP.AUTO: 1 EU/DL (ref 0.2–1)
WBC # BLD AUTO: 12.5 K/UL (ref 3.6–11)
WBC URNS QL MICRO: ABNORMAL /HPF (ref 0–4)

## 2024-11-28 PROCEDURE — 6360000002 HC RX W HCPCS: Performed by: STUDENT IN AN ORGANIZED HEALTH CARE EDUCATION/TRAINING PROGRAM

## 2024-11-28 PROCEDURE — 2500000003 HC RX 250 WO HCPCS: Performed by: STUDENT IN AN ORGANIZED HEALTH CARE EDUCATION/TRAINING PROGRAM

## 2024-11-28 PROCEDURE — 6370000000 HC RX 637 (ALT 250 FOR IP): Performed by: STUDENT IN AN ORGANIZED HEALTH CARE EDUCATION/TRAINING PROGRAM

## 2024-11-28 PROCEDURE — 36415 COLL VENOUS BLD VENIPUNCTURE: CPT

## 2024-11-28 PROCEDURE — 94640 AIRWAY INHALATION TREATMENT: CPT

## 2024-11-28 PROCEDURE — 85025 COMPLETE CBC W/AUTO DIFF WBC: CPT

## 2024-11-28 PROCEDURE — 82962 GLUCOSE BLOOD TEST: CPT

## 2024-11-28 PROCEDURE — 2700000000 HC OXYGEN THERAPY PER DAY

## 2024-11-28 PROCEDURE — 81001 URINALYSIS AUTO W/SCOPE: CPT

## 2024-11-28 PROCEDURE — 6370000000 HC RX 637 (ALT 250 FOR IP): Performed by: INTERNAL MEDICINE

## 2024-11-28 PROCEDURE — 80053 COMPREHEN METABOLIC PANEL: CPT

## 2024-11-28 PROCEDURE — 2580000003 HC RX 258: Performed by: STUDENT IN AN ORGANIZED HEALTH CARE EDUCATION/TRAINING PROGRAM

## 2024-11-28 PROCEDURE — 2060000000 HC ICU INTERMEDIATE R&B

## 2024-11-28 PROCEDURE — 6360000002 HC RX W HCPCS: Performed by: INTERNAL MEDICINE

## 2024-11-28 PROCEDURE — 2580000003 HC RX 258: Performed by: INTERNAL MEDICINE

## 2024-11-28 RX ORDER — IPRATROPIUM BROMIDE AND ALBUTEROL SULFATE 2.5; .5 MG/3ML; MG/3ML
1 SOLUTION RESPIRATORY (INHALATION)
Status: DISCONTINUED | OUTPATIENT
Start: 2024-11-28 | End: 2024-11-30 | Stop reason: HOSPADM

## 2024-11-28 RX ORDER — BISACODYL 10 MG
10 SUPPOSITORY, RECTAL RECTAL DAILY PRN
Status: DISCONTINUED | OUTPATIENT
Start: 2024-11-28 | End: 2024-11-30

## 2024-11-28 RX ORDER — POLYETHYLENE GLYCOL 3350 17 G/17G
17 POWDER, FOR SOLUTION ORAL 2 TIMES DAILY
Status: DISCONTINUED | OUTPATIENT
Start: 2024-11-28 | End: 2024-11-30 | Stop reason: HOSPADM

## 2024-11-28 RX ORDER — GUAIFENESIN 200 MG/10ML
400 LIQUID ORAL EVERY 6 HOURS
Status: DISPENSED | OUTPATIENT
Start: 2024-11-28 | End: 2024-11-29

## 2024-11-28 RX ORDER — SENNA AND DOCUSATE SODIUM 50; 8.6 MG/1; MG/1
2 TABLET, FILM COATED ORAL 2 TIMES DAILY
Status: DISCONTINUED | OUTPATIENT
Start: 2024-11-28 | End: 2024-11-30 | Stop reason: HOSPADM

## 2024-11-28 RX ORDER — OXYCODONE HYDROCHLORIDE 5 MG/1
5 TABLET ORAL EVERY 4 HOURS PRN
Status: DISCONTINUED | OUTPATIENT
Start: 2024-11-28 | End: 2024-11-30 | Stop reason: HOSPADM

## 2024-11-28 RX ORDER — METHADONE HYDROCHLORIDE 5 MG/1
5 TABLET ORAL 2 TIMES DAILY
COMMUNITY

## 2024-11-28 RX ORDER — VANCOMYCIN 1.5 G/300ML
1500 INJECTION, SOLUTION INTRAVENOUS EVERY 12 HOURS
Status: DISCONTINUED | OUTPATIENT
Start: 2024-11-29 | End: 2024-11-29

## 2024-11-28 RX ORDER — HYDROMORPHONE HYDROCHLORIDE 2 MG/1
4 TABLET ORAL EVERY 4 HOURS PRN
COMMUNITY

## 2024-11-28 RX ADMIN — INSULIN LISPRO 12 UNITS: 100 INJECTION, SOLUTION INTRAVENOUS; SUBCUTANEOUS at 12:09

## 2024-11-28 RX ADMIN — METHOCARBAMOL TABLETS 750 MG: 750 TABLET, COATED ORAL at 12:09

## 2024-11-28 RX ADMIN — IPRATROPIUM BROMIDE AND ALBUTEROL SULFATE 1 DOSE: 2.5; .5 SOLUTION RESPIRATORY (INHALATION) at 14:34

## 2024-11-28 RX ADMIN — METOPROLOL TARTRATE 25 MG: 25 TABLET, FILM COATED ORAL at 21:32

## 2024-11-28 RX ADMIN — APIXABAN 5 MG: 5 TABLET, FILM COATED ORAL at 08:32

## 2024-11-28 RX ADMIN — METOPROLOL TARTRATE 25 MG: 25 TABLET, FILM COATED ORAL at 08:31

## 2024-11-28 RX ADMIN — POLYETHYLENE GLYCOL 3350 17 G: 17 POWDER, FOR SOLUTION ORAL at 15:24

## 2024-11-28 RX ADMIN — WATER 40 MG: 1 INJECTION INTRAMUSCULAR; INTRAVENOUS; SUBCUTANEOUS at 01:05

## 2024-11-28 RX ADMIN — Medication: at 08:39

## 2024-11-28 RX ADMIN — INSULIN GLARGINE 45 UNITS: 100 INJECTION, SOLUTION SUBCUTANEOUS at 08:34

## 2024-11-28 RX ADMIN — IPRATROPIUM BROMIDE AND ALBUTEROL SULFATE 1 DOSE: 2.5; .5 SOLUTION RESPIRATORY (INHALATION) at 07:36

## 2024-11-28 RX ADMIN — ASPIRIN 81 MG: 81 TABLET, CHEWABLE ORAL at 08:32

## 2024-11-28 RX ADMIN — SODIUM CHLORIDE, PRESERVATIVE FREE 10 ML: 5 INJECTION INTRAVENOUS at 21:32

## 2024-11-28 RX ADMIN — SENNOSIDES AND DOCUSATE SODIUM 2 TABLET: 50; 8.6 TABLET ORAL at 21:32

## 2024-11-28 RX ADMIN — CETIRIZINE HYDROCHLORIDE 10 MG: 10 TABLET, FILM COATED ORAL at 08:32

## 2024-11-28 RX ADMIN — BUDESONIDE 250 MCG: 0.25 INHALANT RESPIRATORY (INHALATION) at 07:31

## 2024-11-28 RX ADMIN — WATER 40 MG: 1 INJECTION INTRAMUSCULAR; INTRAVENOUS; SUBCUTANEOUS at 15:23

## 2024-11-28 RX ADMIN — ANASTROZOLE 1 MG: 1 TABLET, COATED ORAL at 08:29

## 2024-11-28 RX ADMIN — INSULIN LISPRO 8 UNITS: 100 INJECTION, SOLUTION INTRAVENOUS; SUBCUTANEOUS at 17:34

## 2024-11-28 RX ADMIN — INSULIN LISPRO 8 UNITS: 100 INJECTION, SOLUTION INTRAVENOUS; SUBCUTANEOUS at 22:30

## 2024-11-28 RX ADMIN — ACETAMINOPHEN 650 MG: 325 TABLET ORAL at 06:06

## 2024-11-28 RX ADMIN — Medication 2500 MG: at 15:19

## 2024-11-28 RX ADMIN — FUROSEMIDE 40 MG: 10 INJECTION, SOLUTION INTRAMUSCULAR; INTRAVENOUS at 08:36

## 2024-11-28 RX ADMIN — FUROSEMIDE 40 MG: 10 INJECTION, SOLUTION INTRAMUSCULAR; INTRAVENOUS at 17:34

## 2024-11-28 RX ADMIN — WATER 40 MG: 1 INJECTION INTRAMUSCULAR; INTRAVENOUS; SUBCUTANEOUS at 08:37

## 2024-11-28 RX ADMIN — ATORVASTATIN CALCIUM 10 MG: 10 TABLET, FILM COATED ORAL at 08:35

## 2024-11-28 RX ADMIN — GUAIFENESIN 400 MG: 200 SOLUTION ORAL at 21:31

## 2024-11-28 RX ADMIN — ARFORMOTEROL TARTRATE 15 MCG: 15 SOLUTION RESPIRATORY (INHALATION) at 20:13

## 2024-11-28 RX ADMIN — MICONAZOLE NITRATE: 2 POWDER TOPICAL at 22:37

## 2024-11-28 RX ADMIN — SODIUM CHLORIDE, PRESERVATIVE FREE 10 ML: 5 INJECTION INTRAVENOUS at 08:37

## 2024-11-28 RX ADMIN — OXYCODONE 5 MG: 5 TABLET ORAL at 23:10

## 2024-11-28 RX ADMIN — APIXABAN 5 MG: 5 TABLET, FILM COATED ORAL at 21:28

## 2024-11-28 RX ADMIN — OXYCODONE 5 MG: 5 TABLET ORAL at 08:33

## 2024-11-28 RX ADMIN — GUAIFENESIN 400 MG: 200 SOLUTION ORAL at 15:23

## 2024-11-28 RX ADMIN — IPRATROPIUM BROMIDE AND ALBUTEROL SULFATE 1 DOSE: 2.5; .5 SOLUTION RESPIRATORY (INHALATION) at 20:03

## 2024-11-28 RX ADMIN — BUDESONIDE 250 MCG: 0.25 INHALANT RESPIRATORY (INHALATION) at 20:13

## 2024-11-28 RX ADMIN — SENNOSIDES AND DOCUSATE SODIUM 2 TABLET: 50; 8.6 TABLET ORAL at 15:23

## 2024-11-28 RX ADMIN — AZITHROMYCIN MONOHYDRATE 500 MG: 500 INJECTION, POWDER, LYOPHILIZED, FOR SOLUTION INTRAVENOUS at 08:42

## 2024-11-28 RX ADMIN — WATER 40 MG: 1 INJECTION INTRAMUSCULAR; INTRAVENOUS; SUBCUTANEOUS at 23:42

## 2024-11-28 RX ADMIN — INSULIN LISPRO 8 UNITS: 100 INJECTION, SOLUTION INTRAVENOUS; SUBCUTANEOUS at 08:34

## 2024-11-28 RX ADMIN — MICONAZOLE NITRATE: 2 POWDER TOPICAL at 08:43

## 2024-11-28 RX ADMIN — PANTOPRAZOLE SODIUM 40 MG: 40 TABLET, DELAYED RELEASE ORAL at 06:07

## 2024-11-28 RX ADMIN — ARFORMOTEROL TARTRATE 15 MCG: 15 SOLUTION RESPIRATORY (INHALATION) at 07:31

## 2024-11-28 ASSESSMENT — PAIN DESCRIPTION - ONSET
ONSET: ON-GOING

## 2024-11-28 ASSESSMENT — PAIN DESCRIPTION - LOCATION
LOCATION: BACK

## 2024-11-28 ASSESSMENT — PAIN SCALES - GENERAL
PAINLEVEL_OUTOF10: 3
PAINLEVEL_OUTOF10: 0
PAINLEVEL_OUTOF10: 7
PAINLEVEL_OUTOF10: 0
PAINLEVEL_OUTOF10: 5
PAINLEVEL_OUTOF10: 6
PAINLEVEL_OUTOF10: 2
PAINLEVEL_OUTOF10: 7
PAINLEVEL_OUTOF10: 7
PAINLEVEL_OUTOF10: 2
PAINLEVEL_OUTOF10: 1

## 2024-11-28 ASSESSMENT — PAIN DESCRIPTION - FREQUENCY
FREQUENCY: INTERMITTENT
FREQUENCY: CONTINUOUS
FREQUENCY: CONTINUOUS

## 2024-11-28 ASSESSMENT — PAIN DESCRIPTION - PAIN TYPE
TYPE: CHRONIC PAIN

## 2024-11-28 ASSESSMENT — PAIN DESCRIPTION - ORIENTATION
ORIENTATION: MID;LOWER
ORIENTATION: LOWER
ORIENTATION: LOWER
ORIENTATION: MID;LOWER
ORIENTATION: LOWER

## 2024-11-28 ASSESSMENT — PAIN DESCRIPTION - DESCRIPTORS
DESCRIPTORS: DISCOMFORT
DESCRIPTORS: ACHING
DESCRIPTORS: DISCOMFORT;SPASM

## 2024-11-28 NOTE — PROGRESS NOTES
Pharmacy Antimicrobial Kinetic Dosing    Indication for Antimicrobials: bloodstream infection     Current Regimen of Each Antimicrobial:  Vancomycin - pharmacy dosing; Start Date ; Day # 1    Previous Antimicrobial Therapy:  Ceftriaxone/azithromycin     Goal Level: Vancomycin -600    Date Dose & Interval Measured (mcg/mL) Predicted AUC 24-48 Predicted AUC 24,ss                          Significant Cultures:    - blood pending    Labs:  Recent Labs     Units 24  0618 24  0507 24  0557 24  1736   CREATININE MG/DL  --  0.78 0.78 0.59   BUN MG/DL  --  25* 20 15   PROCAL ng/mL  --   --   --  0.15   WBC K/uL 12.5*  --  13.3* 15.3*     Temp (24hrs), Av.7 °F (36.5 °C), Min:97.3 °F (36.3 °C), Max:98 °F (36.7 °C)    Conditions for Dosing Consideration: None    Creatinine Clearance (mL/min): Estimated Creatinine Clearance: 131 mL/min (based on SCr of 0.78 mg/dL).     Impression/Plan:   Vancomycin 2500 mg IV once then 1500 mg IV Q12H  Predicted FDQ18-69 = 480, Predicted AUC24,ss = 508  BMP daily  Antimicrobial stop date TBD     Pharmacy will follow daily and adjust medications as appropriate for renal function and/or serum levels.    Thank you,  Derek Trujillo, [facility]

## 2024-11-28 NOTE — PROGRESS NOTES
Hospitalist Progress Note    NAME:   Jessica Mane   : 1970   MRN: [id number]     Date/Time: 2024 10:03 PM  Patient PCP: No primary care provider on file.    Estimated discharge date: 2024  Barriers: Blood cultures      Assessment / Plan:    Blood cultures positive for staph 1 out of 2, likely contaminant  -Will repeat cultures, patient asymptomatic    Acute on chronic respiratory failure baseline 5 LPM NC  COPD exacerbation likely from medication nonadherence, improving  - CTPA with no PE, chronic volume loss to left lung, atelectasis and scarring to left lung  - Procalcitonin 0.15   - Reports not taking home medications for past several days due to not feeling well generally   -Was on IV Ceftriaxone and azithromycin  - Check respiratory panel  - BNP elevated prior to baseline, does have LE edema - IV lasix x1 in ED, will continue IV lasix 40mg BID for now, monitor response    -Ceftriaxone was discontinued, no evidence of pneumonia, continue azithromycin, steroids and DuoNebs.    Hx of pulmonary embolism  - Off of home eliquis for several days due to not feeling well  - CTPA negative  -Negative LE duplex  - Continue home eliquis      CAD  chronic CHF, unable to specify LVEF  HTN  -Multiple poor TTEs on file with poor visualization   -Continue aspirin and statin  -Continue IV lasix BID  -Continue PTA metoprolol      DM type 2 on insulin  -Restart Lantus 40 units daily with sliding scale as needed     Metastatic breast cancer POA mainly to bone  Chronic Pain syndrome due to Bony mets POA  Morbid Obesity POA  -Seen by palliative care prior admission  -S/p radiation   -Continue anastrozole     Ex-smoker, quit         Medical Decision Making:   I personally reviewed labs: CBC, CMP  I personally reviewed imaging: CTPA   I personally reviewed EKG: N/A  Toxic drug monitoring: N/A  Discussed case with: Patient    Code Status: Full Code   DVT Prophylaxis: Apixaban  Consults: N/A

## 2024-11-28 NOTE — PROGRESS NOTES
End of Shift Note    Bedside shift change report given to  (oncoming nurse) by Hedy Allred RN (offgoing nurse).  Report included the following information SBAR, ED Summary, Intake/Output, MAR, Recent Results, and Cardiac Rhythm NSR- ST    Shift worked:  7021-4556     Shift summary and any significant changes:     Pt requested different bed at 2300 Explained to patient that we would have to order bed in morning since the transport people were gone for the night. Pt was displeased and continued to ask about the bed the rest of the night. Bed ordered      Concerns for physician to address:       Zone phone for oncoming shift:          Activity:  Level of Assistance: Maximum assist, patient does 25-49%  Number times ambulated in hallways past shift: 0  Number of times OOB to chair past shift: 0    Cardiac:   Cardiac Monitoring: Yes      Cardiac Rhythm: Sinus rhythm    Access:  Current line(s): PIV     Genitourinary:   Urinary Status: Voiding, External catheter    Respiratory:   O2 Device: Nasal cannula  Chronic home O2 use?: YES  Incentive spirometer at bedside: YES    GI:  Last BM (including prior to admit): 11/25/24  Current diet:  ADULT DIET; Regular  Passing flatus: YES    Pain Management:   Patient states pain is manageable on current regimen: YES    Skin:  Gold Scale Score: 17  Interventions: Wound Offloading (Prevention Methods): Bed, pressure reduction mattress, Pillows, Repositioning    Patient Safety:  Fall Risk: Nursing Judgement-Fall Risk High(Add Comments): No  Fall Risk Interventions  Nursing Judgement-Fall Risk High(Add Comments): No  Toilet Every 2 Hours-In Advance of Need: Yes  Hourly Visual Checks: In bed, Awake, Agitated  Fall Visual Posted: Fall sign posted, Socks  Room Door Open: No (Comment) (on contact precautions)  Alarm On: Bed, Chair  Patient Moved Closer to Nursing Station: No    Active Consults:   IP CONSULT TO UROLOGY  IP CONSULT TO HOSPICE    Length of Stay:  Expected LOS: 3  Actual

## 2024-11-29 LAB
ALBUMIN SERPL-MCNC: 2.7 G/DL (ref 3.5–5)
ALBUMIN/GLOB SERPL: 0.7 (ref 1.1–2.2)
ALP SERPL-CCNC: 224 U/L (ref 45–117)
ALT SERPL-CCNC: 19 U/L (ref 12–78)
ANION GAP SERPL CALC-SCNC: 1 MMOL/L (ref 2–12)
AST SERPL-CCNC: 16 U/L (ref 15–37)
BASOPHILS # BLD: 0 K/UL (ref 0–0.1)
BASOPHILS NFR BLD: 0 % (ref 0–1)
BILIRUB SERPL-MCNC: 0.8 MG/DL (ref 0.2–1)
BUN SERPL-MCNC: 24 MG/DL (ref 6–20)
BUN/CREAT SERPL: 30 (ref 12–20)
CALCIUM SERPL-MCNC: 10.2 MG/DL (ref 8.5–10.1)
CHLORIDE SERPL-SCNC: 93 MMOL/L (ref 97–108)
CO2 SERPL-SCNC: 43 MMOL/L (ref 21–32)
CREAT SERPL-MCNC: 0.81 MG/DL (ref 0.55–1.02)
DIFFERENTIAL METHOD BLD: ABNORMAL
EOSINOPHIL # BLD: 0 K/UL (ref 0–0.4)
EOSINOPHIL NFR BLD: 0 % (ref 0–7)
ERYTHROCYTE [DISTWIDTH] IN BLOOD BY AUTOMATED COUNT: 12.4 % (ref 11.5–14.5)
GLOBULIN SER CALC-MCNC: 3.7 G/DL (ref 2–4)
GLUCOSE BLD STRIP.AUTO-MCNC: 198 MG/DL (ref 65–117)
GLUCOSE BLD STRIP.AUTO-MCNC: 263 MG/DL (ref 65–117)
GLUCOSE BLD STRIP.AUTO-MCNC: 278 MG/DL (ref 65–117)
GLUCOSE BLD STRIP.AUTO-MCNC: 312 MG/DL (ref 65–117)
GLUCOSE BLD STRIP.AUTO-MCNC: 395 MG/DL (ref 65–117)
GLUCOSE SERPL-MCNC: 226 MG/DL (ref 65–100)
HCT VFR BLD AUTO: 44 % (ref 35–47)
HGB BLD-MCNC: 13.5 G/DL (ref 11.5–16)
IMM GRANULOCYTES # BLD AUTO: 0.1 K/UL (ref 0–0.04)
IMM GRANULOCYTES NFR BLD AUTO: 1 % (ref 0–0.5)
LYMPHOCYTES # BLD: 0.3 K/UL (ref 0.8–3.5)
LYMPHOCYTES NFR BLD: 3 % (ref 12–49)
MCH RBC QN AUTO: 29 PG (ref 26–34)
MCHC RBC AUTO-ENTMCNC: 30.7 G/DL (ref 30–36.5)
MCV RBC AUTO: 94.6 FL (ref 80–99)
MONOCYTES # BLD: 0.8 K/UL (ref 0–1)
MONOCYTES NFR BLD: 7 % (ref 5–13)
NEUTS SEG # BLD: 10 K/UL (ref 1.8–8)
NEUTS SEG NFR BLD: 89 % (ref 32–75)
NRBC # BLD: 0 K/UL (ref 0–0.01)
NRBC BLD-RTO: 0 PER 100 WBC
PLATELET # BLD AUTO: 191 K/UL (ref 150–400)
PMV BLD AUTO: 10.4 FL (ref 8.9–12.9)
POTASSIUM SERPL-SCNC: 4.2 MMOL/L (ref 3.5–5.1)
PROT SERPL-MCNC: 6.4 G/DL (ref 6.4–8.2)
RBC # BLD AUTO: 4.65 M/UL (ref 3.8–5.2)
SERVICE CMNT-IMP: ABNORMAL
SODIUM SERPL-SCNC: 137 MMOL/L (ref 136–145)
WBC # BLD AUTO: 11.3 K/UL (ref 3.6–11)

## 2024-11-29 PROCEDURE — 6370000000 HC RX 637 (ALT 250 FOR IP): Performed by: STUDENT IN AN ORGANIZED HEALTH CARE EDUCATION/TRAINING PROGRAM

## 2024-11-29 PROCEDURE — 2580000003 HC RX 258: Performed by: STUDENT IN AN ORGANIZED HEALTH CARE EDUCATION/TRAINING PROGRAM

## 2024-11-29 PROCEDURE — 6370000000 HC RX 637 (ALT 250 FOR IP): Performed by: INTERNAL MEDICINE

## 2024-11-29 PROCEDURE — 6360000002 HC RX W HCPCS: Performed by: STUDENT IN AN ORGANIZED HEALTH CARE EDUCATION/TRAINING PROGRAM

## 2024-11-29 PROCEDURE — 94640 AIRWAY INHALATION TREATMENT: CPT

## 2024-11-29 PROCEDURE — 36415 COLL VENOUS BLD VENIPUNCTURE: CPT

## 2024-11-29 PROCEDURE — 85025 COMPLETE CBC W/AUTO DIFF WBC: CPT

## 2024-11-29 PROCEDURE — 82962 GLUCOSE BLOOD TEST: CPT

## 2024-11-29 PROCEDURE — 2500000003 HC RX 250 WO HCPCS: Performed by: STUDENT IN AN ORGANIZED HEALTH CARE EDUCATION/TRAINING PROGRAM

## 2024-11-29 PROCEDURE — 80053 COMPREHEN METABOLIC PANEL: CPT

## 2024-11-29 PROCEDURE — 2060000000 HC ICU INTERMEDIATE R&B

## 2024-11-29 PROCEDURE — 2700000000 HC OXYGEN THERAPY PER DAY

## 2024-11-29 RX ORDER — GUAIFENESIN 200 MG/10ML
400 LIQUID ORAL EVERY 6 HOURS
Status: DISCONTINUED | OUTPATIENT
Start: 2024-11-29 | End: 2024-11-30 | Stop reason: HOSPADM

## 2024-11-29 RX ORDER — PREDNISONE 20 MG/1
40 TABLET ORAL DAILY
Status: DISCONTINUED | OUTPATIENT
Start: 2024-11-30 | End: 2024-11-30 | Stop reason: HOSPADM

## 2024-11-29 RX ADMIN — SENNOSIDES AND DOCUSATE SODIUM 2 TABLET: 50; 8.6 TABLET ORAL at 21:19

## 2024-11-29 RX ADMIN — ANASTROZOLE 1 MG: 1 TABLET, COATED ORAL at 08:59

## 2024-11-29 RX ADMIN — BUDESONIDE 250 MCG: 0.25 INHALANT RESPIRATORY (INHALATION) at 09:46

## 2024-11-29 RX ADMIN — WATER 40 MG: 1 INJECTION INTRAMUSCULAR; INTRAVENOUS; SUBCUTANEOUS at 16:40

## 2024-11-29 RX ADMIN — IPRATROPIUM BROMIDE AND ALBUTEROL SULFATE 1 DOSE: 2.5; .5 SOLUTION RESPIRATORY (INHALATION) at 13:35

## 2024-11-29 RX ADMIN — INSULIN LISPRO 8 UNITS: 100 INJECTION, SOLUTION INTRAVENOUS; SUBCUTANEOUS at 12:33

## 2024-11-29 RX ADMIN — OXYCODONE 5 MG: 5 TABLET ORAL at 21:28

## 2024-11-29 RX ADMIN — ATORVASTATIN CALCIUM 10 MG: 10 TABLET, FILM COATED ORAL at 08:53

## 2024-11-29 RX ADMIN — METOPROLOL TARTRATE 25 MG: 25 TABLET, FILM COATED ORAL at 08:54

## 2024-11-29 RX ADMIN — WATER 40 MG: 1 INJECTION INTRAMUSCULAR; INTRAVENOUS; SUBCUTANEOUS at 08:58

## 2024-11-29 RX ADMIN — GUAIFENESIN 400 MG: 200 SOLUTION ORAL at 08:53

## 2024-11-29 RX ADMIN — ARFORMOTEROL TARTRATE 15 MCG: 15 SOLUTION RESPIRATORY (INHALATION) at 09:46

## 2024-11-29 RX ADMIN — ARFORMOTEROL TARTRATE 15 MCG: 15 SOLUTION RESPIRATORY (INHALATION) at 19:45

## 2024-11-29 RX ADMIN — MICONAZOLE NITRATE: 2 POWDER TOPICAL at 08:59

## 2024-11-29 RX ADMIN — IPRATROPIUM BROMIDE AND ALBUTEROL SULFATE 1 DOSE: 2.5; .5 SOLUTION RESPIRATORY (INHALATION) at 19:37

## 2024-11-29 RX ADMIN — ASPIRIN 81 MG: 81 TABLET, CHEWABLE ORAL at 08:55

## 2024-11-29 RX ADMIN — SENNOSIDES AND DOCUSATE SODIUM 2 TABLET: 50; 8.6 TABLET ORAL at 08:55

## 2024-11-29 RX ADMIN — CETIRIZINE HYDROCHLORIDE 10 MG: 10 TABLET, FILM COATED ORAL at 08:56

## 2024-11-29 RX ADMIN — IPRATROPIUM BROMIDE AND ALBUTEROL SULFATE 1 DOSE: 2.5; .5 SOLUTION RESPIRATORY (INHALATION) at 09:46

## 2024-11-29 RX ADMIN — BUDESONIDE 250 MCG: 0.25 INHALANT RESPIRATORY (INHALATION) at 19:45

## 2024-11-29 RX ADMIN — VANCOMYCIN 1500 MG: 1.5 INJECTION, SOLUTION INTRAVENOUS at 03:29

## 2024-11-29 RX ADMIN — PANTOPRAZOLE SODIUM 40 MG: 40 TABLET, DELAYED RELEASE ORAL at 05:54

## 2024-11-29 RX ADMIN — APIXABAN 5 MG: 5 TABLET, FILM COATED ORAL at 08:55

## 2024-11-29 RX ADMIN — METOPROLOL TARTRATE 25 MG: 25 TABLET, FILM COATED ORAL at 21:18

## 2024-11-29 RX ADMIN — FUROSEMIDE 40 MG: 10 INJECTION, SOLUTION INTRAMUSCULAR; INTRAVENOUS at 08:58

## 2024-11-29 RX ADMIN — INSULIN GLARGINE 45 UNITS: 100 INJECTION, SOLUTION SUBCUTANEOUS at 08:56

## 2024-11-29 RX ADMIN — POLYETHYLENE GLYCOL 3350 17 G: 17 POWDER, FOR SOLUTION ORAL at 08:57

## 2024-11-29 RX ADMIN — SODIUM CHLORIDE, PRESERVATIVE FREE 10 ML: 5 INJECTION INTRAVENOUS at 09:02

## 2024-11-29 RX ADMIN — INSULIN LISPRO 4 UNITS: 100 INJECTION, SOLUTION INTRAVENOUS; SUBCUTANEOUS at 08:56

## 2024-11-29 RX ADMIN — MICONAZOLE NITRATE: 2 POWDER TOPICAL at 21:18

## 2024-11-29 RX ADMIN — SODIUM CHLORIDE, PRESERVATIVE FREE 10 ML: 5 INJECTION INTRAVENOUS at 21:18

## 2024-11-29 RX ADMIN — FUROSEMIDE 40 MG: 10 INJECTION, SOLUTION INTRAMUSCULAR; INTRAVENOUS at 16:39

## 2024-11-29 RX ADMIN — INSULIN LISPRO 8 UNITS: 100 INJECTION, SOLUTION INTRAVENOUS; SUBCUTANEOUS at 16:40

## 2024-11-29 RX ADMIN — GUAIFENESIN 400 MG: 200 SOLUTION ORAL at 21:20

## 2024-11-29 RX ADMIN — APIXABAN 5 MG: 5 TABLET, FILM COATED ORAL at 21:17

## 2024-11-29 RX ADMIN — INSULIN LISPRO 12 UNITS: 100 INJECTION, SOLUTION INTRAVENOUS; SUBCUTANEOUS at 21:41

## 2024-11-29 ASSESSMENT — PAIN SCALES - GENERAL
PAINLEVEL_OUTOF10: 0
PAINLEVEL_OUTOF10: 7
PAINLEVEL_OUTOF10: 0

## 2024-11-29 ASSESSMENT — PAIN DESCRIPTION - ONSET: ONSET: ON-GOING

## 2024-11-29 ASSESSMENT — PAIN DESCRIPTION - FREQUENCY: FREQUENCY: INTERMITTENT

## 2024-11-29 ASSESSMENT — PAIN DESCRIPTION - LOCATION: LOCATION: BACK

## 2024-11-29 ASSESSMENT — PAIN DESCRIPTION - DESCRIPTORS: DESCRIPTORS: ACHING

## 2024-11-29 ASSESSMENT — PAIN DESCRIPTION - ORIENTATION: ORIENTATION: MID;LOWER

## 2024-11-29 NOTE — PROGRESS NOTES
Hospitalist Progress Note    NAME:   Jessica Mane   : 1970   MRN: [id number]     Date/Time: 2024 7:26 PM  Patient PCP: No primary care provider on file.    Estimated discharge date: 2024  Barriers: Blood cultures, hospice outpatent      Assessment / Plan:    Blood cultures positive for staph 1 out of 2, likely contaminant  -Will repeat cultures, patient asymptomatic, add vanc    Acute on chronic respiratory failure baseline 5 LPM NC  COPD exacerbation likely from medication nonadherence, improving  - CTPA with no PE, chronic volume loss to left lung, atelectasis and scarring to left lung  - Procalcitonin 0.15   - Reports not taking home medications for past several days due to not feeling well generally   -Was on IV Ceftriaxone and azithromycin  - Check respiratory panel  - BNP elevated prior to baseline, does have LE edema - IV lasix x1 in ED, will continue IV lasix 40mg BID for now, monitor response    -Ceftriaxone was discontinued, no evidence of pneumonia, continue azithromycin, steroids and DuoNebs.    Hx of pulmonary embolism  - Off of home eliquis for several days due to not feeling well  - CTPA negative  -Negative LE duplex  - Continue home eliquis      Constipatoin  -- c/w miralx, supp, water tap prn    CAD  chronic CHF, unable to specify LVEF  HTN  -Multiple poor TTEs on file with poor visualization   -Continue aspirin and statin  -Continue IV lasix BID  -Continue PTA metoprolol      DM type 2 on insulin  -Restart Lantus 40 units daily with sliding scale as needed     Metastatic breast cancer POA mainly to bone  Chronic Pain syndrome due to Bony mets POA  Morbid Obesity POA  -Seen by palliative care prior admission  -S/p radiation   -Continue anastrozole     Ex-smoker, quit         Medical Decision Making:   I personally reviewed labs: CBC, CMP  I personally reviewed imaging: CTPA   I personally reviewed EKG: N/A  Toxic drug monitoring: N/A  Discussed case with:

## 2024-11-29 NOTE — PROGRESS NOTES
End of Shift Note    Bedside shift change report given to  (oncoming nurse) by Autumn Cabral RN (offgoing nurse).  Report included the following information SBAR    Shift worked:  Pt asking for pain meds .Oxi has been discontinued on MAR the patient meds list updated she takes Methadone 5 mg BID ,Dilaudid po   Perfect served MD 1917 ,2156 waiting for answer   I have updated med list   2300 Md ordered oxi 5 mg po q 4 prn   given         Shift summary and any significant changes:     Pt refuses scheduled turns q 2 hr ,will ask when she feels she needs to turn      Concerns for physician to address:       Zone phone for oncoming shift:          Activity:  Level of Assistance: Maximum assist, patient does 25-49%  Number times ambulated in hallways past shift: 0  Number of times OOB to chair past shift: 0    Cardiac:   Cardiac Monitoring: Yes      Cardiac Rhythm: Sinus rhythm    Access:  Current line(s): PIV     Genitourinary:   Urinary Status: Voiding    Respiratory:   O2 Device: Nasal cannula  Chronic home O2 use?: YES  Incentive spirometer at bedside: YES    GI:  Last BM (including prior to admit): 11/25/24  Current diet:  ADULT DIET; Regular  Passing flatus: YES    Pain Management:   Patient states pain is manageable on current regimen: YES    Skin:  Gold Scale Score: 15  Interventions: Wound Offloading (Prevention Methods): Bed, pressure reduction mattress, Pillows, Repositioning    Patient Safety:  Fall Risk: Nursing Judgement-Fall Risk High(Add Comments): No  Fall Risk Interventions  Nursing Judgement-Fall Risk High(Add Comments): No  Toilet Every 2 Hours-In Advance of Need: Yes  Hourly Visual Checks: In bed, Awake, Agitated  Fall Visual Posted: Fall sign posted, Socks  Room Door Open: No (Comment) (on contact precautions)  Alarm On: Bed, Chair  Patient Moved Closer to Nursing Station: No    Active Consults:   IP CONSULT TO UROLOGY  IP CONSULT TO HOSPICE    Length of Stay:  Expected LOS: 4  Actual LOS: 2    Autumn  JULY Cabral                        \

## 2024-11-29 NOTE — CONSULTS
[facility]              [address] [place] [address]                              CONSULTATION      PATIENT NAME: MATT MCCORMICK              : 1970  MED REC NO: [id number]                       ROOM: [id number]  ACCOUNT NO: [id number]                       ADMIT DATE: 2024  PROVIDER: Derek Ferrer MD    DATE OF SERVICE:  2024    ATTENDING PHYSICIAN:  YADIRA VIEIRA    REASON FOR CONSULTATION:  Gross hematuria.    HISTORY OF PRESENT ILLNESS:  She is a 54-year-old female who is morbidly obese and appears bedridden from such.  At home, she has help from a caretaker and her nephew and manages her urinary incontinence or failure to be able to get to a restroom by wearing diapers.  Here, she has a urowick and grossly bloody urine was noted.  She has not had the ability to see her urine.  Denies any dysuria, but says that she has noticed odor.  Yesterday, she was catheterized for sample, which demonstrates significant red cells, but no evidence of infection.  Therefore, no culture sent.  She has not had an upper tract study since a CAT scan was done 2023, demonstrating extensive osseous metastatic progression with a question of a lung lesion as well.  The kidneys at that time showed multiple calcifications in the left kidney as well as a right renal cyst.  Nothing else that would cause hematuria was noted.    PAST MEDICAL HISTORY:  COPD, history of PE, CAD, type 2 diabetes, metastatic breast cancers to bone.    SOCIAL HISTORY:  Ex-smoker.    MEDICATIONS:  On the chart were reviewed.    PHYSICAL EXAMINATION:  She is currently on oxygen, receiving respiratory therapy, nebulizer treatment.  She is in no apparent distress.  She is massively morbidly obese, but the abdomen is nontender.  Genitalia not able to be examined.    IMAGING:  CT images from 2023 were reviewed and do show left renal calcifications, thought to be probable stones more likely than

## 2024-11-29 NOTE — PLAN OF CARE
Problem: Chronic Conditions and Co-morbidities  Goal: Patient's chronic conditions and co-morbidity symptoms are monitored and maintained or improved  11/28/2024 2318 by Autumn Cabral, RN  Outcome: Progressing  11/28/2024 2318 by Autumn Cabral, RN  Outcome: Progressing

## 2024-11-29 NOTE — CONSULTS
Consult dictated  Impression: Gross hematuria with no evidence of UTI.  I do note that she is on Eliquis.  No recent upper tract studies done, but one done about a year ago demonstrates left renal calcifications thought to be stones.  Normal renal function.  Noted are her significant comorbidities including metastatic breast cancer and morbid obesity.  See my full note.   Recommendation: If aggressive investigation is thought to be warranted given her situation, a CT/IVP can be considered although possibly not performed because of her morbid obesity.  If CT/IVP is desired and completed, please reconsult urology.  Thank you

## 2024-11-29 NOTE — CARE COORDINATION
Transition of Care Plan:    RUR: 15%  Prior Level of Functioning: assistance  Disposition: home with Hospice, [facility] will resume Gisella Guzman [phone].   RAY: 11/29/24  If SNF or IPR: Date FOC offered:   Date FOC received:   Accepting facility:   Date authorization started with reference number:   Date authorization received and expires:   Follow up appointments: PCP and specialist  DME needed: has 02 5 lpm at home  Transportation at discharge: to be arranged, Medicaid transport  IM/IMM Medicare/ letter given: sign at DC  Is patient a [facility] and [facility] with [facility]?    If yes, was [facility] transfer form completed and [facility] notified?   Caregiver Contact:     Yunior Mane (Child)  [phone]     Discharge Caregiver contacted prior to discharge?   Care Conference needed?   Barriers to discharge: clinical improvement.    5:30 pm Patient's caretaker will be coming home tonight so she can be discharged tomorrow. She has 2 5 lpm concentrators at home but says when she uses both her electricity goes off so the plan is for hospice to deliver a 10 lpm concentrator on Monday. Patient wants to go home tomorrow. She will have to have  transport set up for 5 lpm.if and when she is discharged.  The hospice company is planning on seeing patient on Monday. Contact is Jayna at [phone].    English Lawrence MCDOWELL CM   [phone]

## 2024-11-29 NOTE — PLAN OF CARE
Problem: Chronic Conditions and Co-morbidities  Goal: Patient's chronic conditions and co-morbidity symptoms are monitored and maintained or improved  Outcome: Progressing     Problem: Skin/Tissue Integrity  Goal: Absence of new skin breakdown  Description: 1.  Monitor for areas of redness and/or skin breakdown  2.  Assess vascular access sites hourly  3.  Every 4-6 hours minimum:  Change oxygen saturation probe site  4.  Every 4-6 hours:  If on nasal continuous positive airway pressure, respiratory therapy assess nares and determine need for appliance change or resting period.  Outcome: Progressing     Problem: Safety - Adult  Goal: Free from fall injury  Outcome: Progressing     Problem: Respiratory - Adult  Goal: Achieves optimal ventilation and oxygenation  Outcome: Progressing     Problem: Pain  Goal: Verbalizes/displays adequate comfort level or baseline comfort level  Outcome: Progressing

## 2024-11-30 VITALS
WEIGHT: 293 LBS | HEART RATE: 68 BPM | TEMPERATURE: 98 F | HEIGHT: 66 IN | BODY MASS INDEX: 47.09 KG/M2 | OXYGEN SATURATION: 92 % | SYSTOLIC BLOOD PRESSURE: 145 MMHG | DIASTOLIC BLOOD PRESSURE: 132 MMHG | RESPIRATION RATE: 17 BRPM

## 2024-11-30 PROBLEM — J96.01 ACUTE HYPOXIC RESPIRATORY FAILURE: Status: RESOLVED | Noted: 2024-11-26 | Resolved: 2024-11-30

## 2024-11-30 LAB
ANION GAP SERPL CALC-SCNC: 3 MMOL/L (ref 2–12)
BACTERIA SPEC CULT: ABNORMAL
BACTERIA SPEC CULT: ABNORMAL
BUN SERPL-MCNC: 24 MG/DL (ref 6–20)
BUN/CREAT SERPL: 38 (ref 12–20)
CALCIUM SERPL-MCNC: 10.3 MG/DL (ref 8.5–10.1)
CHLORIDE SERPL-SCNC: 90 MMOL/L (ref 97–108)
CO2 SERPL-SCNC: 44 MMOL/L (ref 21–32)
CREAT SERPL-MCNC: 0.64 MG/DL (ref 0.55–1.02)
GLUCOSE BLD STRIP.AUTO-MCNC: 149 MG/DL (ref 65–117)
GLUCOSE BLD STRIP.AUTO-MCNC: 225 MG/DL (ref 65–117)
GLUCOSE BLD STRIP.AUTO-MCNC: 257 MG/DL (ref 65–117)
GLUCOSE SERPL-MCNC: 159 MG/DL (ref 65–100)
POTASSIUM SERPL-SCNC: 3.8 MMOL/L (ref 3.5–5.1)
SERVICE CMNT-IMP: ABNORMAL
SODIUM SERPL-SCNC: 137 MMOL/L (ref 136–145)
VANCOMYCIN SERPL-MCNC: 7.4 UG/ML

## 2024-11-30 PROCEDURE — 36415 COLL VENOUS BLD VENIPUNCTURE: CPT

## 2024-11-30 PROCEDURE — 6370000000 HC RX 637 (ALT 250 FOR IP): Performed by: STUDENT IN AN ORGANIZED HEALTH CARE EDUCATION/TRAINING PROGRAM

## 2024-11-30 PROCEDURE — 6360000002 HC RX W HCPCS: Performed by: STUDENT IN AN ORGANIZED HEALTH CARE EDUCATION/TRAINING PROGRAM

## 2024-11-30 PROCEDURE — 2700000000 HC OXYGEN THERAPY PER DAY

## 2024-11-30 PROCEDURE — 80202 ASSAY OF VANCOMYCIN: CPT

## 2024-11-30 PROCEDURE — 82962 GLUCOSE BLOOD TEST: CPT

## 2024-11-30 PROCEDURE — 2580000003 HC RX 258: Performed by: STUDENT IN AN ORGANIZED HEALTH CARE EDUCATION/TRAINING PROGRAM

## 2024-11-30 PROCEDURE — 94640 AIRWAY INHALATION TREATMENT: CPT

## 2024-11-30 PROCEDURE — 80048 BASIC METABOLIC PNL TOTAL CA: CPT

## 2024-11-30 PROCEDURE — 2500000003 HC RX 250 WO HCPCS: Performed by: STUDENT IN AN ORGANIZED HEALTH CARE EDUCATION/TRAINING PROGRAM

## 2024-11-30 RX ORDER — GUAIFENESIN 200 MG/10ML
400 LIQUID ORAL EVERY 6 HOURS
Status: DISCONTINUED | OUTPATIENT
Start: 2024-11-30 | End: 2024-11-30 | Stop reason: HOSPADM

## 2024-11-30 RX ORDER — PREDNISONE 20 MG/1
40 TABLET ORAL DAILY
Qty: 10 TABLET | Refills: 0 | Status: SHIPPED | OUTPATIENT
Start: 2024-12-01 | End: 2024-12-06

## 2024-11-30 RX ORDER — FUROSEMIDE 40 MG/1
40 TABLET ORAL 2 TIMES DAILY
Qty: 60 TABLET | Refills: 3 | Status: SHIPPED | OUTPATIENT
Start: 2024-11-30

## 2024-11-30 RX ORDER — AMMONIUM LACTATE 12 G/100G
LOTION TOPICAL
Qty: 1 EACH | Refills: 0 | Status: SHIPPED | OUTPATIENT
Start: 2024-11-30

## 2024-11-30 RX ORDER — POLYETHYLENE GLYCOL 3350 17 G/17G
17 POWDER, FOR SOLUTION ORAL DAILY PRN
Qty: 527 G | Refills: 0 | Status: SHIPPED | OUTPATIENT
Start: 2024-11-30 | End: 2024-12-30

## 2024-11-30 RX ORDER — FUROSEMIDE 40 MG/1
80 TABLET ORAL 2 TIMES DAILY
Status: DISCONTINUED | OUTPATIENT
Start: 2024-12-01 | End: 2024-11-30 | Stop reason: HOSPADM

## 2024-11-30 RX ORDER — GUAIFENESIN 200 MG/10ML
400 LIQUID ORAL EVERY 6 HOURS
Qty: 20 ML | Refills: 0 | Status: SHIPPED | OUTPATIENT
Start: 2024-11-30 | End: 2024-12-01

## 2024-11-30 RX ADMIN — SODIUM CHLORIDE, PRESERVATIVE FREE 10 ML: 5 INJECTION INTRAVENOUS at 08:45

## 2024-11-30 RX ADMIN — ANASTROZOLE 1 MG: 1 TABLET, COATED ORAL at 08:26

## 2024-11-30 RX ADMIN — INSULIN LISPRO 8 UNITS: 100 INJECTION, SOLUTION INTRAVENOUS; SUBCUTANEOUS at 17:14

## 2024-11-30 RX ADMIN — GUAIFENESIN 400 MG: 200 SOLUTION ORAL at 08:27

## 2024-11-30 RX ADMIN — PANTOPRAZOLE SODIUM 40 MG: 40 TABLET, DELAYED RELEASE ORAL at 08:27

## 2024-11-30 RX ADMIN — IPRATROPIUM BROMIDE AND ALBUTEROL SULFATE 1 DOSE: 2.5; .5 SOLUTION RESPIRATORY (INHALATION) at 09:57

## 2024-11-30 RX ADMIN — INSULIN GLARGINE 45 UNITS: 100 INJECTION, SOLUTION SUBCUTANEOUS at 08:26

## 2024-11-30 RX ADMIN — ACETAMINOPHEN 650 MG: 325 TABLET ORAL at 06:32

## 2024-11-30 RX ADMIN — Medication: at 11:39

## 2024-11-30 RX ADMIN — GUAIFENESIN 400 MG: 200 SOLUTION ORAL at 17:14

## 2024-11-30 RX ADMIN — GUAIFENESIN 400 MG: 200 SOLUTION ORAL at 15:09

## 2024-11-30 RX ADMIN — IPRATROPIUM BROMIDE AND ALBUTEROL SULFATE 1 DOSE: 2.5; .5 SOLUTION RESPIRATORY (INHALATION) at 14:26

## 2024-11-30 RX ADMIN — ATORVASTATIN CALCIUM 10 MG: 10 TABLET, FILM COATED ORAL at 08:27

## 2024-11-30 RX ADMIN — FUROSEMIDE 40 MG: 10 INJECTION, SOLUTION INTRAMUSCULAR; INTRAVENOUS at 08:27

## 2024-11-30 RX ADMIN — METOPROLOL TARTRATE 25 MG: 25 TABLET, FILM COATED ORAL at 08:26

## 2024-11-30 RX ADMIN — BUDESONIDE 250 MCG: 0.25 INHALANT RESPIRATORY (INHALATION) at 09:57

## 2024-11-30 RX ADMIN — SENNOSIDES AND DOCUSATE SODIUM 2 TABLET: 50; 8.6 TABLET ORAL at 08:27

## 2024-11-30 RX ADMIN — CETIRIZINE HYDROCHLORIDE 10 MG: 10 TABLET, FILM COATED ORAL at 08:27

## 2024-11-30 RX ADMIN — PREDNISONE 40 MG: 20 TABLET ORAL at 08:27

## 2024-11-30 RX ADMIN — ARFORMOTEROL TARTRATE 15 MCG: 15 SOLUTION RESPIRATORY (INHALATION) at 09:57

## 2024-11-30 RX ADMIN — APIXABAN 5 MG: 5 TABLET, FILM COATED ORAL at 08:26

## 2024-11-30 RX ADMIN — INSULIN LISPRO 4 UNITS: 100 INJECTION, SOLUTION INTRAVENOUS; SUBCUTANEOUS at 11:36

## 2024-11-30 RX ADMIN — ASPIRIN 81 MG: 81 TABLET, CHEWABLE ORAL at 08:27

## 2024-11-30 RX ADMIN — MICONAZOLE NITRATE: 2 POWDER TOPICAL at 08:28

## 2024-11-30 ASSESSMENT — PAIN SCALES - GENERAL
PAINLEVEL_OUTOF10: 0
PAINLEVEL_OUTOF10: 5

## 2024-11-30 ASSESSMENT — PAIN - FUNCTIONAL ASSESSMENT: PAIN_FUNCTIONAL_ASSESSMENT: ACTIVITIES ARE NOT PREVENTED

## 2024-11-30 ASSESSMENT — PAIN DESCRIPTION - ORIENTATION: ORIENTATION: RIGHT;LEFT;LOWER

## 2024-11-30 ASSESSMENT — PAIN DESCRIPTION - LOCATION: LOCATION: BACK;HIP

## 2024-11-30 ASSESSMENT — PAIN DESCRIPTION - DESCRIPTORS: DESCRIPTORS: ACHING

## 2024-11-30 NOTE — PLAN OF CARE
Problem: Chronic Conditions and Co-morbidities  Goal: Patient's chronic conditions and co-morbidity symptoms are monitored and maintained or improved  Outcome: Progressing     Problem: Skin/Tissue Integrity  Goal: Absence of new skin breakdown  Description: 1.  Monitor for areas of redness and/or skin breakdown  2.  Assess vascular access sites hourly  3.  Every 4-6 hours minimum:  Change oxygen saturation probe site  4.  Every 4-6 hours:  If on nasal continuous positive airway pressure, respiratory therapy assess nares and determine need for appliance change or resting period.  Outcome: Progressing     Problem: Safety - Adult  Goal: Free from fall injury  Outcome: Progressing     Problem: Respiratory - Adult  Goal: Achieves optimal ventilation and oxygenation  11/29/2024 1944 by Autumn Cabral, RN  Outcome: Progressing  Flowsheets (Taken 11/29/2024 1922)  Achieves optimal ventilation and oxygenation:   Assess for changes in respiratory status   Assess for changes in mentation and behavior   Position to facilitate oxygenation and minimize respiratory effort   Oxygen supplementation based on oxygen saturation or arterial blood gases  11/29/2024 0616 by Benny Castañeda RCP  Outcome: Progressing     Problem: Pain  Goal: Verbalizes/displays adequate comfort level or baseline comfort level  Outcome: Progressing

## 2024-11-30 NOTE — PROGRESS NOTES
Hospitalist Progress Note    NAME:   Jessica Mane   : 1970   MRN: [id number]     Date/Time: 2024 7:35 PM  Patient PCP: No primary care provider on file.    Estimated discharge date: 2024  Barriers: Delay discharge as the patient does not have a caregiver at home      Assessment / Plan:    Blood cultures positive for staph 1 out of 2,  - Likely contaminant  - negative repeat cultures, patient asymptomatic, Dc'd vanc    Acute on chronic respiratory failure baseline 5 LPM NC  COPD exacerbation likely from medication nonadherence, improving  - CTPA with no PE, chronic volume loss to left lung, atelectasis and scarring to left lung  - s/p Ceftriaxone 1 dose and 3 days azithromycin  - -ve respiratory panel  - Transition IV steroids to p.o., for 2 more days    Gross hematuria, improving  -In the setting of history of cancer and blood thinner  - UA negative for infection, positive for RBCs  -Urine color is clearing up, its less red  -Urology was consulted, they recommended CT IVP but due to her obesity may not be performed  - If her urine does not clear up, will consider CT IVP or outpatient follow-up    Hx of pulmonary embolism  - Continue home eliquis      Constipatoin, resolved  -- c/w miralx, supp, water tap prn    CAD  chronic CHF, unable to specify LVEF  HTN  Bilateral lower extremity edema  -Multiple poor TTEs on file with poor visualization   -Continue aspirin and statin  -Continue IV lasix BID  -Continue PTA metoprolol   -Was on Lasix IV 40 mg twice daily   -Will transition to p.o. Lasix    DM type 2 on insulin  - Hyperglycemia likely from steroids use for COPD exacerbation  -C/W Lantus 45 units daily with sliding scale as needed     Metastatic breast cancer POA mainly to bone  Chronic Pain syndrome due to Bony mets POA  Morbid Obesity POA  -Seen by palliative care prior admission  -S/p radiation   -Continue anastrozole     History of TERRA not on CPAP  - Possible obesity hypoventilation

## 2024-11-30 NOTE — PROGRESS NOTES
End of Shift Note    Bedside shift change report given to RN (oncoming nurse) by Nai Hughes RN (offgoing nurse).  Report included the following information SBAR, Intake/Output, MAR, Accordion, Recent Results, Cardiac Rhythm NSR, and Dual Neuro Assessment    Shift worked:  7a-7p     Shift summary and any significant changes:    0730 Supplemental O2 NC@4 L, Critical lab result CO2 44, pt AOx4, appears to be her baseline  1105 Pulmonary consult, Dr. Gama at bedside, see note, no additional recommendations made, pt can be d/c home   1405 Communicated to CM barriers to d/c mainly oxygen setup at pt's home, CM messaged the MD  1600 Pt is adamant about going home today, has care givers and home kari nurse scheduled to be at home when she is d/c today  1630 CM working to set up medicare transport vs.HTH  1650 HTH scheduled for 1845, phone #[phone] 1740 [employer] called with report ([phone]), conformed that pt will be seen on Monday and she can call at anytime if she needs anything  1830 d/c instruction  given to pt  1850 HTH taking pt home   Concerns for physician to address:  CO2 44 in AM labs, attending physician and pulmonologist made aware, current regimen appropriate, no additional interventions needed     Zone phone for [facility] shift:   [phone]       Activity:  Level of Assistance: Maximum assist, patient does 25-49%  Number times ambulated in hallways past shift: 0  Number of times OOB to chair past shift: 0    Cardiac:   Cardiac Monitoring: Yes      Cardiac Rhythm: Sinus rhythm    Access:  Current line(s): PIV     Genitourinary:   Urinary Status: Voiding, External catheter    Respiratory:   O2 Device: Nasal cannula  Chronic home O2 use?: YES  Incentive spirometer at bedside: YES    GI:  Last BM (including prior to admit): 11/29/24  Current diet:  ADULT DIET; Regular  Passing flatus: YES    Pain Management:   Patient states pain is manageable on current regimen:

## 2024-11-30 NOTE — PLAN OF CARE
Problem: Chronic Conditions and Co-morbidities  Goal: Patient's chronic conditions and co-morbidity symptoms are monitored and maintained or improved  Outcome: Adequate for Discharge     Problem: Discharge Planning  Goal: Discharge to home or other facility with appropriate resources  Outcome: Adequate for Discharge     Problem: Skin/Tissue Integrity  Goal: Absence of new skin breakdown  Description: 1.  Monitor for areas of redness and/or skin breakdown  2.  Assess vascular access sites hourly  3.  Every 4-6 hours minimum:  Change oxygen saturation probe site  4.  Every 4-6 hours:  If on nasal continuous positive airway pressure, respiratory therapy assess nares and determine need for appliance change or resting period.  Outcome: Adequate for Discharge     Problem: Safety - Adult  Goal: Free from fall injury  Outcome: Adequate for Discharge     Problem: Respiratory - Adult  Goal: Achieves optimal ventilation and oxygenation  11/30/2024 1026 by Nai Hughes RN  Outcome: Adequate for Discharge  11/30/2024 0959 by Franklyn Alfaro, RT  Outcome: Progressing     Problem: Pain  Goal: Verbalizes/displays adequate comfort level or baseline comfort level  Outcome: Adequate for Discharge  Flowsheets (Taken 11/30/2024 0632 by Autumn Cabral, RN)  Verbalizes/displays adequate comfort level or baseline comfort level:   Encourage patient to monitor pain and request assistance   Assess pain using appropriate pain scale   Administer analgesics based on type and severity of pain and evaluate response   Implement non-pharmacological measures as appropriate and evaluate response   Consider cultural and social influences on pain and pain management

## 2024-11-30 NOTE — CARE COORDINATION
Transition of Care Plan:     RUR: 15%    Prior Level of Functioning: assistance    Disposition: Home with Hospice, H[facility] will resume care Jayna Guzman [phone].     RAY: 11/30/24    5:06 PM   Hospital to Home transport ETA 6:45 PM   PCS placed on bedside chart.     4:36 PM   CM called D[facility] and they are in agreement with plan.     CM talked to [employer] and her insurance will not cover stretcher.  The only thing her insurance will cover is WC transportation.     RN supervisor approved hospital to home bariatric transport to get her home.   CM working on that now.     4:30 PM   CM notified that Pt is going to DC home today.     CM called [employer]: medicaid Transport: [phone] and is trying to set up transport.     4:11 PM   CM talked to RN and she indicated that MD wanted to know if it was ok to send Pt home.  Today.. Pt is on 5 LPM has needed more oxygen during hospitalization.     CM talked to Pt who wants to go home.  We called CG and confirmed he will be home this evening, If MD were to release Pt as medically stable.     Per English, CM note: She has 2 5 lpm concentrators at home but says when she uses both her electricity goes off so the plan is for hospice to deliver a 10 lpm concentrator on Monday. Patient wants to go home tomorrow. She will have to have transport set up for 5 lpm.if and when she is discharged. The hospice company is planning on seeing patient on Monday. Contact is Jayna at [phone]     RN perfect serve MD that indicated plan to send Pt home on Monday, 12/2/24    Pt wanted to speak to MD.      If SNF or IPR: Date FOC offered:   Date FOC received:   Accepting facility:   Date authorization started with reference number:   Date authorization received and expires:   Follow up appointments: PCP and specialist  DME needed: Hospice has 0xygen 5 lpm at home  Transportation at discharge: to be arranged, Medicaid transport  IM/IMM Medicare/  letter given: 2nd  letter 11/29/24.  Is patient a [place] and connected with [facility]?               If yes, was [place] transfer form completed and [facility] notified?   Caregiver Contact:     Yunior Mane (Child)  [phone]      Discharge Caregiver contacted prior to discharge? yes  Care Conference needed?   Barriers to discharge: clinical improvement.    Joesph Contreras   [phone]

## 2024-11-30 NOTE — CONSULTS
Pulmonary, Critical Care, and Sleep Medicine    Initial Patient Consult    Name: Jessica Mane MRN: [id number]   : 1970 Hospital: [facility]   Date: 2024        IMPRESSION:   COPD - no PFTs, identifies Dr. Breen as her pulmonologist, but she only saw him once as inpatient  Reported history of sleep apnea - intolerant of Trilogy  Chronic hypoxic respiratory failure - on 5 LPM at home, on 4 LPM here with good SpO2  Metastatic breast cancer, on home hospice  DM, HTN, CAD, CHF  Morbid obesity  Former smoker      RECOMMENDATIONS:   O2 at/below baseline  She is on Trelegy daily and albuterol PRN at home, and this seems appropriate  She was intolerant of NIV (Trilogy) in past, and she is bedbound so could not come for a sleep evaluation  Would continue her inhalers and her home O2  She is on hospice at home, so office follow up would be inappropriate      Subjective:     This patient has been seen and evaluated at the request of Dr. Kay for \"hx of chronic respiratory failure, zander not cpap, needs eval cpap, optimizing respiratory meds, set up outpatient\".  Patient is a 53 yo female former smoker with metastatic breast cancer on hospice, a clinical diagnosis of COPD (no PFTs), and a reported history of ZANDER, intolerant of her prior mask for Trilogy. She reports that she is on home hospice, and then she revokes it when she wants a doctor appointment or to go to the hospital, and then she reinstates it when she gets home. She was admitted for dyspnea and was treated for a COPD exacerbation.  There are plans to discharge her today, and we were consulted to see if her regimen was appropriate and to set her up with a sleep evaluation. She is bedbound at baseline.       Past Medical History:   Diagnosis Date    Arthritis     Asthma     Breast lump     CAD (coronary artery disease)     Cancer (HCC)     breast cancer    Congestive heart failure (HCC)     COPD (chronic obstructive pulmonary  (PRILOSEC) 40 MG delayed release capsule Take 1 capsule by mouth daily    Automatic Reconciliation, Ar     No Known Allergies   Social History     Tobacco Use    Smoking status: Former     Current packs/day: 0.25     Types: Cigarettes    Smokeless tobacco: Never    Tobacco comments:     Quit smoking: Patient states \"I  aint smoking no more d*mn cigarettes after yesterday\" 2018   Substance Use Topics    Alcohol use: No      Family History   Problem Relation Age of Onset    Heart Disease Mother     Heart Disease Father     Heart Disease Sister     Breast Cancer Maternal Grandmother     Breast Cancer Paternal Grandmother         @[facility]@    Review of Systems:  Pertinent items are noted in HPI.    Objective:   Vital Signs:    BP (!) 146/73   Pulse 67   Temp 97.8 °F (36.6 °C) (Oral)   Resp 15   Ht 1.676 m (5' 6\")   Wt (!) 156.5 kg (345 lb)   SpO2 96%   BMI 55.68 kg/m²             Temp (24hrs), Av °F (36.7 °C), Min:97.8 °F (36.6 °C), Max:98 °F (36.7 °C)       Intake/Output:   Last shift:      No intake/output data recorded.  Last 3 shifts:  1901 -  0700  In: 950 [P.O.:950]  Out: 4800 [Urine:4800]    Intake/Output Summary (Last 24 hours) at 2024 1231  Last data filed at 2024 0400  Gross per 24 hour   Intake 550 ml   Output 2350 ml   Net -1800 ml      Physical Exam:   General:  Alert, cooperative, no distress, morbidly obese   Head:  Normocephalic, without obvious abnormality, atraumatic.   Eyes:  Conjunctivae/corneas clear.    Nose: Nares normal. Septum midline. Mucosa normal.     Throat: Lips, mucosa, and tongue normal.     Neck: Supple, symmetrical, trachea midline    Lungs:   Distant breath sounds   Chest wall:  No tenderness or deformity.   Heart:  Distant heart tones   Abdomen:   Soft, non-tender. Bowel sounds normal.     Extremities: Extremities normal, atraumatic, no cyanosis   Skin: Skin color, texture, turgor normal. No rashes or lesions   Lymph nodes: Cervical,

## 2024-11-30 NOTE — PROGRESS NOTES
End of Shift Note    Bedside shift change report given to  (oncoming nurse) by Autumn Cabral RN (offgoing nurse).  Report included the following information SBAR    Shift worked:       Shift summary and any significant changes:          Concerns for physician to address:  Critical CO2 @ 44 with am labs   received call from lab @ 0543  notified MD 0547   Pt is asymptomatic , No New orders      Zone phone for oncoming shift:          Activity:  Level of Assistance: Maximum assist, patient does 25-49%  Number times ambulated in hallways past shift: 0  Number of times OOB to chair past shift: 0    Cardiac:   Cardiac Monitoring: Yes      Cardiac Rhythm: Sinus rhythm    Access:  Current line(s): PIV     Genitourinary:   Urinary Status: Voiding, External catheter    Respiratory:   O2 Device: Nasal cannula  Chronic home O2 use?: YES  Incentive spirometer at bedside: NO    GI:  Last BM (including prior to admit): 11/25/24  Current diet:  ADULT DIET; Regular  Passing flatus: YES    Pain Management:   Patient states pain is manageable on current regimen: YES    Skin:  Gold Scale Score: 14  Interventions: Wound Offloading (Prevention Methods): Bed, pressure reduction mattress, Pillows, Repositioning, Turning    Patient Safety:  Fall Risk: Nursing Judgement-Fall Risk High(Add Comments): No  Fall Risk Interventions  Nursing Judgement-Fall Risk High(Add Comments): No  Toilet Every 2 Hours-In Advance of Need: Yes  Hourly Visual Checks: In bed, Eyes closed  Fall Visual Posted: Fall sign posted, Socks  Room Door Open: No (Comment) (on contact precautions)  Alarm On: Bed, Chair  Patient Moved Closer to Nursing Station: No    Active Consults:   IP CONSULT TO UROLOGY  IP CONSULT TO HOSPICE  IP CONSULT TO DIABETES MANAGEMENT    Length of Stay:  Expected LOS: 5  Actual LOS: 3    Autumn Cabral RN

## 2024-11-30 NOTE — DISCHARGE INSTRUCTIONS
Please follow-up with PCP within 1 week of hospital discharge, if you do not have 1 please establish 1 and make an appointment, you can do a virtual appointments if PCP allows.    Continue taking prednisone 40 mg for 5 more days COPD exacerbation    Start taking furosemide 40 mg twice daily for fluid retention       Continue taking home medications as prescribed

## 2024-11-30 NOTE — PROGRESS NOTES
Hospitalist Progress Note    NAME:   Jessica Mane   : 1970   MRN: [id number]     Date/Time: 2024 9:29 AM  Patient PCP: No primary care provider on file.    Estimated discharge date: 2024  Barriers: Delay discharge as the patient does not have a caregiver at home      Assessment / Plan:    Blood cultures positive for staph 1 out of 2,  - Likely contaminant  - negative repeat cultures, patient asymptomatic, Dc'd vanc    Acute on chronic respiratory failure baseline 5 LPM NC  COPD exacerbation likely from medication nonadherence, improving  Possible TERRA not cpap  - CTPA with no PE, chronic volume loss to left lung, atelectasis and scarring to left lung  - s/p Ceftriaxone 1 dose and 3 days azithromycin  - -ve respiratory panel  - Transition IV steroids to p.o., for 2 more days  --pulm consult to optimize patient respiratory regimen, cpap recs, outpatient follow up.      Gross hematuria, improving  -In the setting of history of cancer and blood thinner  - UA negative for infection, positive for RBCs  -Urine color is clearing up, its less red  -Urology was consulted, they recommended CT IVP but due to her obesity may not be performed  - If her urine does not clear up, will consider CT IVP or outpatient follow-up    Hx of pulmonary embolism  - Continue home eliquis      Constipatoin, resolved  -- c/w miralx, supp, water tap prn    CAD  chronic CHF, unable to specify LVEF  HTN  Bilateral lower extremity edema  -Multiple poor TTEs on file with poor visualization   -Continue aspirin and statin  -Continue IV lasix BID  -Continue PTA metoprolol   -Was on Lasix IV 40 mg twice daily   -Will transition to p.o. Lasix    DM type 2 on insulin  - Hyperglycemia likely from steroids use for COPD exacerbation  -C/W Lantus 45 units daily with sliding scale as needed     Metastatic breast cancer POA mainly to bone  Chronic Pain syndrome due to Bony mets POA  Morbid Obesity POA  -Seen by palliative care prior

## 2024-12-01 NOTE — PROGRESS NOTES
I have reviewed discharge instructions with the patient. The patient verbalized understanding. Discharge medications reviewed with patient and appropriate educational materials and side effects teaching were provided. Follow-up appointments reviewed. Opportunity for questions and clarification was provided.  Venous access removed without difficulty.  Patient's belongings gathered and sent with patient. Patient is ready for discharge.     Nai Hughes RN

## 2024-12-02 NOTE — DISCHARGE SUMMARY
to take this     predniSONE 20 MG tablet  Commonly known as: DELTASONE  Take 2 tablets by mouth daily for 5 doses Take 40 mg daily for 5 more days, then you can go back to your regular home medication dose,  What changed:   how much to take  additional instructions            CONTINUE taking these medications      acetaminophen 500 MG tablet  Commonly known as: TYLENOL     albuterol (2.5 MG/3ML) 0.083% nebulizer solution  Commonly known as: PROVENTIL  Take 3 mLs by nebulization every 6 hours as needed for Wheezing     anastrozole 1 MG tablet  Commonly known as: ARIMIDEX  Take 1 tablet by mouth daily     apixaban 5 MG Tabs tablet  Commonly known as: ELIQUIS  Take 1 tablet by mouth 2 times daily     aspirin 81 MG chewable tablet     * blood glucose test strips  Test 3 times a day & as needed for symptoms of irregular blood glucose. Dispense sufficient amount for indicated testing frequency plus additional to accommodate PRN testing needs.     * blood glucose test strips strip  Commonly known as: ASCENSIA AUTODISC VI;ONE TOUCH ULTRA TEST VI  1 each by In Vitro route daily As needed.     fluticasone 50 MCG/ACT nasal spray  Commonly known as: FLONASE     * glucose monitoring kit  1 kit by Does not apply route daily     * True Metrix Meter Marina  Use as directed     HYDROmorphone 2 MG tablet  Commonly known as: DILAUDID     insulin glargine 100 UNIT/ML injection vial  Commonly known as: LANTUS     ipratropium 0.5 mg-albuterol 2.5 mg 0.5-2.5 (3) MG/3ML Soln nebulizer solution  Commonly known as: DUONEB     lidocaine 4 % external patch     loratadine 10 MG tablet  Commonly known as: CLARITIN     lovastatin 40 MG tablet  Commonly known as: MEVACOR     methadone 5 MG tablet  Commonly known as: DOLOPHINE     methocarbamol 750 MG tablet  Commonly known as: ROBAXIN  Take 1 tablet by mouth 3 times daily as needed (pain or spasm)     metoprolol tartrate 25 MG tablet  Commonly known as: LOPRESSOR     nitroGLYCERIN 0.4 MG SL

## 2024-12-04 LAB
BACTERIA SPEC CULT: ABNORMAL
BACTERIA SPEC CULT: ABNORMAL
SERVICE CMNT-IMP: ABNORMAL

## 2025-01-05 ENCOUNTER — HOSPITAL ENCOUNTER (EMERGENCY)
Facility: HOSPITAL | Age: 55
Discharge: HOME | End: 2025-01-06
Attending: STUDENT IN AN ORGANIZED HEALTH CARE EDUCATION/TRAINING PROGRAM
Payer: MEDICARE

## 2025-01-05 ENCOUNTER — APPOINTMENT (OUTPATIENT)
Facility: HOSPITAL | Age: 55
End: 2025-01-05
Payer: MEDICARE

## 2025-01-05 DIAGNOSIS — J44.1 COPD EXACERBATION (HCC): Primary | ICD-10-CM

## 2025-01-05 LAB
ALBUMIN SERPL-MCNC: 3.1 G/DL (ref 3.5–5)
ALBUMIN/GLOB SERPL: 0.9 (ref 1.1–2.2)
ALP SERPL-CCNC: 269 U/L (ref 45–117)
ALT SERPL-CCNC: 25 U/L (ref 12–78)
ANION GAP BLD CALC-SCNC: 6 (ref 10–20)
ANION GAP SERPL CALC-SCNC: 2 MMOL/L (ref 2–12)
AST SERPL-CCNC: 18 U/L (ref 15–37)
BASE EXCESS BLD CALC-SCNC: 9.1 MMOL/L
BASOPHILS # BLD: 0 K/UL (ref 0–0.1)
BASOPHILS NFR BLD: 0 % (ref 0–1)
BILIRUB SERPL-MCNC: 0.8 MG/DL (ref 0.2–1)
BUN SERPL-MCNC: 21 MG/DL (ref 6–20)
BUN/CREAT SERPL: 30 (ref 12–20)
CA-I BLD-MCNC: 1.44 MMOL/L (ref 1.15–1.33)
CALCIUM SERPL-MCNC: 10.4 MG/DL (ref 8.5–10.1)
CHLORIDE BLD-SCNC: 100 MMOL/L (ref 100–111)
CHLORIDE SERPL-SCNC: 101 MMOL/L (ref 97–108)
CO2 BLD-SCNC: 38 MMOL/L (ref 22–29)
CO2 SERPL-SCNC: 36 MMOL/L (ref 21–32)
CREAT SERPL-MCNC: 0.69 MG/DL (ref 0.55–1.02)
CREAT UR-MCNC: 0.7 MG/DL (ref 0.6–1.3)
D DIMER PPP FEU-MCNC: 0.2 MG/L FEU (ref 0–0.65)
DIFFERENTIAL METHOD BLD: ABNORMAL
EOSINOPHIL # BLD: 0.1 K/UL (ref 0–0.4)
EOSINOPHIL NFR BLD: 1 % (ref 0–7)
ERYTHROCYTE [DISTWIDTH] IN BLOOD BY AUTOMATED COUNT: 13.1 % (ref 11.5–14.5)
FLUAV RNA SPEC QL NAA+PROBE: NOT DETECTED
FLUBV RNA SPEC QL NAA+PROBE: NOT DETECTED
GLOBULIN SER CALC-MCNC: 3.6 G/DL (ref 2–4)
GLUCOSE BLD STRIP.AUTO-MCNC: 215 MG/DL (ref 74–99)
GLUCOSE SERPL-MCNC: 163 MG/DL (ref 65–100)
HCO3 BLDA-SCNC: 38 MMOL/L
HCT VFR BLD AUTO: 45.4 % (ref 35–47)
HGB BLD-MCNC: 14.4 G/DL (ref 11.5–16)
IMM GRANULOCYTES # BLD AUTO: 0.1 K/UL (ref 0–0.04)
IMM GRANULOCYTES NFR BLD AUTO: 1 % (ref 0–0.5)
LACTATE BLD-SCNC: 1.15 MMOL/L (ref 0.4–2)
LYMPHOCYTES # BLD: 0.8 K/UL (ref 0.8–3.5)
LYMPHOCYTES NFR BLD: 7 % (ref 12–49)
MCH RBC QN AUTO: 30.6 PG (ref 26–34)
MCHC RBC AUTO-ENTMCNC: 31.7 G/DL (ref 30–36.5)
MCV RBC AUTO: 96.6 FL (ref 80–99)
MONOCYTES # BLD: 1.6 K/UL (ref 0–1)
MONOCYTES NFR BLD: 13 % (ref 5–13)
NEUTS SEG # BLD: 9 K/UL (ref 1.8–8)
NEUTS SEG NFR BLD: 78 % (ref 32–75)
NRBC # BLD: 0 K/UL (ref 0–0.01)
NRBC BLD-RTO: 0 PER 100 WBC
NT PRO BNP: 1218 PG/ML
PCO2 BLDV: 69.9 MMHG (ref 41–51)
PH BLDV: 7.35 (ref 7.32–7.42)
PLATELET # BLD AUTO: 162 K/UL (ref 150–400)
PMV BLD AUTO: 10 FL (ref 8.9–12.9)
PO2 BLDV: 66 MMHG (ref 25–40)
POTASSIUM BLD-SCNC: 4.1 MMOL/L (ref 3.5–5.5)
POTASSIUM SERPL-SCNC: 3.9 MMOL/L (ref 3.5–5.1)
PROT SERPL-MCNC: 6.7 G/DL (ref 6.4–8.2)
RBC # BLD AUTO: 4.7 M/UL (ref 3.8–5.2)
SAO2 % BLD: 90 % (ref 94–98)
SARS-COV-2 RNA RESP QL NAA+PROBE: NOT DETECTED
SODIUM BLD-SCNC: 144 MMOL/L (ref 136–145)
SODIUM SERPL-SCNC: 139 MMOL/L (ref 136–145)
SOURCE: NORMAL
SPECIMEN SITE: ABNORMAL
TROPONIN I SERPL HS-MCNC: 56 NG/L (ref 0–51)
TSH SERPL DL<=0.05 MIU/L-ACNC: 0.54 UIU/ML (ref 0.36–3.74)
WBC # BLD AUTO: 11.6 K/UL (ref 3.6–11)

## 2025-01-05 PROCEDURE — 94640 AIRWAY INHALATION TREATMENT: CPT

## 2025-01-05 PROCEDURE — 84132 ASSAY OF SERUM POTASSIUM: CPT

## 2025-01-05 PROCEDURE — 84295 ASSAY OF SERUM SODIUM: CPT

## 2025-01-05 PROCEDURE — 2700000000 HC OXYGEN THERAPY PER DAY

## 2025-01-05 PROCEDURE — 82803 BLOOD GASES ANY COMBINATION: CPT

## 2025-01-05 PROCEDURE — 87636 SARSCOV2 & INF A&B AMP PRB: CPT

## 2025-01-05 PROCEDURE — 84443 ASSAY THYROID STIM HORMONE: CPT

## 2025-01-05 PROCEDURE — 85379 FIBRIN DEGRADATION QUANT: CPT

## 2025-01-05 PROCEDURE — 93005 ELECTROCARDIOGRAM TRACING: CPT | Performed by: STUDENT IN AN ORGANIZED HEALTH CARE EDUCATION/TRAINING PROGRAM

## 2025-01-05 PROCEDURE — 80053 COMPREHEN METABOLIC PANEL: CPT

## 2025-01-05 PROCEDURE — 82330 ASSAY OF CALCIUM: CPT

## 2025-01-05 PROCEDURE — 6370000000 HC RX 637 (ALT 250 FOR IP): Performed by: STUDENT IN AN ORGANIZED HEALTH CARE EDUCATION/TRAINING PROGRAM

## 2025-01-05 PROCEDURE — 82947 ASSAY GLUCOSE BLOOD QUANT: CPT

## 2025-01-05 PROCEDURE — 83880 ASSAY OF NATRIURETIC PEPTIDE: CPT

## 2025-01-05 PROCEDURE — 84484 ASSAY OF TROPONIN QUANT: CPT

## 2025-01-05 PROCEDURE — 99285 EMERGENCY DEPT VISIT HI MDM: CPT

## 2025-01-05 PROCEDURE — 6360000002 HC RX W HCPCS: Performed by: STUDENT IN AN ORGANIZED HEALTH CARE EDUCATION/TRAINING PROGRAM

## 2025-01-05 PROCEDURE — 85025 COMPLETE CBC W/AUTO DIFF WBC: CPT

## 2025-01-05 PROCEDURE — 36415 COLL VENOUS BLD VENIPUNCTURE: CPT

## 2025-01-05 PROCEDURE — 71045 X-RAY EXAM CHEST 1 VIEW: CPT

## 2025-01-05 RX ORDER — ALBUTEROL SULFATE 0.83 MG/ML
2.5 SOLUTION RESPIRATORY (INHALATION)
Status: COMPLETED | OUTPATIENT
Start: 2025-01-05 | End: 2025-01-05

## 2025-01-05 RX ORDER — PREDNISONE 20 MG/1
60 TABLET ORAL
Status: COMPLETED | OUTPATIENT
Start: 2025-01-05 | End: 2025-01-05

## 2025-01-05 RX ORDER — GUAIFENESIN 600 MG/1
600 TABLET, EXTENDED RELEASE ORAL
Status: COMPLETED | OUTPATIENT
Start: 2025-01-05 | End: 2025-01-05

## 2025-01-05 RX ORDER — IPRATROPIUM BROMIDE AND ALBUTEROL SULFATE 2.5; .5 MG/3ML; MG/3ML
3 SOLUTION RESPIRATORY (INHALATION)
Status: COMPLETED | OUTPATIENT
Start: 2025-01-05 | End: 2025-01-05

## 2025-01-05 RX ORDER — DOXYCYCLINE HYCLATE 100 MG
100 TABLET ORAL
Status: COMPLETED | OUTPATIENT
Start: 2025-01-05 | End: 2025-01-05

## 2025-01-05 RX ADMIN — ALBUTEROL SULFATE 2.5 MG: 2.5 SOLUTION RESPIRATORY (INHALATION) at 23:21

## 2025-01-05 RX ADMIN — GUAIFENESIN 600 MG: 600 TABLET ORAL at 23:21

## 2025-01-05 RX ADMIN — PREDNISONE 60 MG: 20 TABLET ORAL at 20:06

## 2025-01-05 RX ADMIN — IPRATROPIUM BROMIDE AND ALBUTEROL SULFATE 3 DOSE: .5; 3 SOLUTION RESPIRATORY (INHALATION) at 20:36

## 2025-01-05 RX ADMIN — DOXYCYCLINE HYCLATE 100 MG: 100 TABLET, COATED ORAL at 23:21

## 2025-01-05 ASSESSMENT — PAIN SCALES - GENERAL: PAINLEVEL_OUTOF10: 6

## 2025-01-05 ASSESSMENT — PAIN DESCRIPTION - LOCATION: LOCATION: BACK;LEG

## 2025-01-05 ASSESSMENT — PAIN - FUNCTIONAL ASSESSMENT: PAIN_FUNCTIONAL_ASSESSMENT: NONE - DENIES PAIN

## 2025-01-05 ASSESSMENT — PAIN DESCRIPTION - PAIN TYPE: TYPE: CHRONIC PAIN

## 2025-01-06 VITALS
BODY MASS INDEX: 48.82 KG/M2 | WEIGHT: 293 LBS | DIASTOLIC BLOOD PRESSURE: 72 MMHG | HEART RATE: 86 BPM | TEMPERATURE: 98.7 F | OXYGEN SATURATION: 97 % | SYSTOLIC BLOOD PRESSURE: 129 MMHG | RESPIRATION RATE: 16 BRPM | HEIGHT: 65 IN

## 2025-01-06 LAB
APPEARANCE UR: CLEAR
BACTERIA URNS QL MICRO: NEGATIVE /HPF
BILIRUB UR QL: NEGATIVE
COLOR UR: ABNORMAL
EKG ATRIAL RATE: 112 BPM
EKG DIAGNOSIS: NORMAL
EKG P AXIS: 76 DEGREES
EKG P-R INTERVAL: 158 MS
EKG Q-T INTERVAL: 338 MS
EKG QRS DURATION: 76 MS
EKG QTC CALCULATION (BAZETT): 461 MS
EKG R AXIS: 92 DEGREES
EKG T AXIS: 58 DEGREES
EKG VENTRICULAR RATE: 112 BPM
EPITH CASTS URNS QL MICRO: ABNORMAL /LPF
GLUCOSE UR STRIP.AUTO-MCNC: NEGATIVE MG/DL
HGB UR QL STRIP: ABNORMAL
HYALINE CASTS URNS QL MICRO: ABNORMAL /LPF (ref 0–2)
KETONES UR QL STRIP.AUTO: 40 MG/DL
LEUKOCYTE ESTERASE UR QL STRIP.AUTO: NEGATIVE
NITRITE UR QL STRIP.AUTO: NEGATIVE
PH UR STRIP: 6 (ref 5–8)
PROT UR STRIP-MCNC: 100 MG/DL
RBC #/AREA URNS HPF: ABNORMAL /HPF (ref 0–5)
SP GR UR REFRACTOMETRY: 1.02
URINE CULTURE IF INDICATED: ABNORMAL
UROBILINOGEN UR QL STRIP.AUTO: 4 EU/DL (ref 0.2–1)
WBC URNS QL MICRO: ABNORMAL /HPF (ref 0–4)

## 2025-01-06 PROCEDURE — 2700000000 HC OXYGEN THERAPY PER DAY

## 2025-01-06 PROCEDURE — 81001 URINALYSIS AUTO W/SCOPE: CPT

## 2025-01-06 RX ORDER — GUAIFENESIN 600 MG/1
600 TABLET, EXTENDED RELEASE ORAL 2 TIMES DAILY
Qty: 30 TABLET | Refills: 0 | Status: SHIPPED | OUTPATIENT
Start: 2025-01-06 | End: 2025-01-21

## 2025-01-06 RX ORDER — PREDNISONE 20 MG/1
60 TABLET ORAL DAILY
Qty: 15 TABLET | Refills: 0 | Status: SHIPPED | OUTPATIENT
Start: 2025-01-06 | End: 2025-01-11

## 2025-01-06 RX ORDER — DOXYCYCLINE HYCLATE 100 MG
100 TABLET ORAL 2 TIMES DAILY
Qty: 14 TABLET | Refills: 0 | Status: SHIPPED | OUTPATIENT
Start: 2025-01-06 | End: 2025-01-13

## 2025-01-06 NOTE — ED NOTES
RN reviewed discharge instructions with the patient. The pt verbalized understanding. All questions and concerns were addressed. The patient is discharged with papers in hand.  Pt is alert and oriented. Respirations are clear and unlabored. Patient has been made aware of prescription(s) called into pharmacy for . [facility] is here to transport patient home.

## 2025-01-06 NOTE — DISCHARGE INSTRUCTIONS
Thank You!    It was a pleasure taking care of you in our Emergency Department today. We know that when you come to our Emergency Department, you are entrusting us with your health, comfort, and safety. Our physicians and nurses honor that trust, and truly appreciate the opportunity to care for you and your loved ones.      We also value your feedback. If you receive a survey about your Emergency Department experience today, please fill it out.  We care about our patients' feedback, and we listen to what you have to say.  Thank you.    Duc Lopez MD  ________________________________________________________________________  I have included a copy of your lab results and/or radiologic studies from today's visit so you can have them easily available at your follow-up visit. We hope you feel better and please do not hesitate to contact the ED if you have any questions at all!    Recent Results (from the past 12 hour(s))   EKG 12 Lead    Collection Time: 01/05/25  6:41 PM   Result Value Ref Range    Ventricular Rate 112 BPM    Atrial Rate 112 BPM    P-R Interval 158 ms    QRS Duration 76 ms    Q-T Interval 338 ms    QTc Calculation (Bazett) 461 ms    P Axis 76 degrees    R Axis 92 degrees    T Axis 58 degrees    Diagnosis       Sinus tachycardia with premature supraventricular complexes  Rightward axis  When compared with ECG of 25-NOV-2024 17:19,  premature supraventricular complexes are now present     COVID-19 & Influenza Combo    Collection Time: 01/05/25  7:43 PM    Specimen: Nasopharyngeal   Result Value Ref Range    Source Nasopharyngeal      SARS-CoV-2, PCR Not detected NOTD      Rapid Influenza A By PCR Not detected NOTD      Rapid Influenza B By PCR Not detected NOTD     CBC with Auto Differential    Collection Time: 01/05/25  8:46 PM   Result Value Ref Range    WBC 11.6 (H) 3.6 - 11.0 K/uL    RBC 4.70 3.80 - 5.20 M/uL    Hemoglobin 14.4 11.5 - 16.0 g/dL    Hematocrit 45.4 35.0 - 47.0 %    MCV 96.6 80.0 -

## 2025-01-06 NOTE — ED PROVIDER NOTES
as needed (pain or spasm)     metoprolol tartrate 25 MG tablet  Commonly known as: LOPRESSOR     miconazole 2 % powder  Commonly known as: MICOTIN  Apply topically 2 times daily.     nitroGLYCERIN 0.4 MG SL tablet  Commonly known as: NITROSTAT     NovoLOG FlexPen 100 UNIT/ML injection pen  Generic drug: insulin aspart     omeprazole 40 MG delayed release capsule  Commonly known as: PRILOSEC     sennosides-docusate sodium 8.6-50 MG tablet  Commonly known as: SENOKOT-S  Take 2 tablets by mouth daily     Trelegy Ellipta 100-62.5-25 MCG/ACT Aepb inhaler  Generic drug: fluticasone-umeclidin-vilant           * This list has 2 medication(s) that are the same as other medications prescribed for you. Read the directions carefully, and ask your doctor or other care provider to review them with you.                   Where to Get Your Medications        These medications were sent to [facility]/pharmacy #[id number] - [place], [place] - [address] [phone] - F [phone]  [address] [address]      Hours: 24-hours Phone: [phone]   doxycycline hyclate 100 MG tablet  guaiFENesin 600 MG extended release tablet  predniSONE 20 MG tablet           DISCONTINUED MEDICATIONS:  Current Discharge Medication List          I am the Primary Clinician of Record.   Duc Lopez MD (electronically signed)      (Please note that parts of this dictation were completed with voice recognition software. Quite often unanticipated grammatical, syntax, homophones, and other interpretive errors are inadvertently transcribed by the computer software. Please disregards these errors. Please excuse any errors that have escaped final proofreading.)         Duc Lopez MD  01/06/25 0146

## 2025-02-07 ENCOUNTER — APPOINTMENT (OUTPATIENT)
Facility: HOSPITAL | Age: 55
DRG: 193 | End: 2025-02-07
Payer: MEDICARE

## 2025-02-07 ENCOUNTER — HOSPITAL ENCOUNTER (INPATIENT)
Facility: HOSPITAL | Age: 55
LOS: 3 days | Discharge: HOSPICE | DRG: 193 | End: 2025-02-10
Attending: EMERGENCY MEDICINE | Admitting: HOSPITALIST
Payer: MEDICARE

## 2025-02-07 DIAGNOSIS — J90 PLEURAL EFFUSION ON LEFT: ICD-10-CM

## 2025-02-07 DIAGNOSIS — J81.1 CHRONIC PULMONARY EDEMA: ICD-10-CM

## 2025-02-07 DIAGNOSIS — R06.02 SHORTNESS OF BREATH: ICD-10-CM

## 2025-02-07 DIAGNOSIS — J96.11 CHRONIC RESPIRATORY FAILURE WITH HYPOXIA: ICD-10-CM

## 2025-02-07 DIAGNOSIS — I50.9 ACUTE CONGESTIVE HEART FAILURE, UNSPECIFIED HEART FAILURE TYPE (HCC): ICD-10-CM

## 2025-02-07 DIAGNOSIS — J10.1 INFLUENZA A: Primary | ICD-10-CM

## 2025-02-07 LAB
ALBUMIN SERPL-MCNC: 2.6 G/DL (ref 3.5–5)
ALBUMIN/GLOB SERPL: 0.7 (ref 1.1–2.2)
ALP SERPL-CCNC: 259 U/L (ref 45–117)
ALT SERPL-CCNC: 56 U/L (ref 12–78)
ANION GAP SERPL CALC-SCNC: 6 MMOL/L (ref 2–12)
AST SERPL-CCNC: 56 U/L (ref 15–37)
BASOPHILS # BLD: 0 K/UL (ref 0–0.1)
BASOPHILS NFR BLD: 0 % (ref 0–1)
BILIRUB SERPL-MCNC: 0.9 MG/DL (ref 0.2–1)
BUN SERPL-MCNC: 9 MG/DL (ref 6–20)
BUN/CREAT SERPL: 15 (ref 12–20)
CALCIUM SERPL-MCNC: 9.9 MG/DL (ref 8.5–10.1)
CHLORIDE SERPL-SCNC: 97 MMOL/L (ref 97–108)
CO2 SERPL-SCNC: 37 MMOL/L (ref 21–32)
CREAT SERPL-MCNC: 0.62 MG/DL (ref 0.55–1.02)
DIFFERENTIAL METHOD BLD: ABNORMAL
EKG ATRIAL RATE: 97 BPM
EKG DIAGNOSIS: NORMAL
EKG P AXIS: 82 DEGREES
EKG P-R INTERVAL: 162 MS
EKG Q-T INTERVAL: 368 MS
EKG QRS DURATION: 82 MS
EKG QTC CALCULATION (BAZETT): 467 MS
EKG R AXIS: 116 DEGREES
EKG T AXIS: 63 DEGREES
EKG VENTRICULAR RATE: 97 BPM
EOSINOPHIL # BLD: 0.07 K/UL (ref 0–0.4)
EOSINOPHIL NFR BLD: 1 % (ref 0–7)
ERYTHROCYTE [DISTWIDTH] IN BLOOD BY AUTOMATED COUNT: 13.8 % (ref 11.5–14.5)
FLUAV RNA SPEC QL NAA+PROBE: DETECTED
FLUBV RNA SPEC QL NAA+PROBE: NOT DETECTED
GLOBULIN SER CALC-MCNC: 3.5 G/DL (ref 2–4)
GLUCOSE BLD STRIP.AUTO-MCNC: 151 MG/DL (ref 65–117)
GLUCOSE BLD STRIP.AUTO-MCNC: 211 MG/DL (ref 65–117)
GLUCOSE SERPL-MCNC: 159 MG/DL (ref 65–100)
HCT VFR BLD AUTO: 45.8 % (ref 35–47)
HGB BLD-MCNC: 14.3 G/DL (ref 11.5–16)
IMM GRANULOCYTES # BLD AUTO: 0 K/UL (ref 0–0.04)
IMM GRANULOCYTES NFR BLD AUTO: 0 % (ref 0–0.5)
LACTATE BLD-SCNC: 1.08 MMOL/L (ref 0.4–2)
LIPASE SERPL-CCNC: 36 U/L (ref 13–75)
LYMPHOCYTES # BLD: 0.61 K/UL (ref 0.8–3.5)
LYMPHOCYTES NFR BLD: 9 % (ref 12–49)
MCH RBC QN AUTO: 29.2 PG (ref 26–34)
MCHC RBC AUTO-ENTMCNC: 31.2 G/DL (ref 30–36.5)
MCV RBC AUTO: 93.5 FL (ref 80–99)
METAMYELOCYTES NFR BLD MANUAL: 1 %
MONOCYTES # BLD: 1.09 K/UL (ref 0–1)
MONOCYTES NFR BLD: 16 % (ref 5–13)
NEUTS SEG # BLD: 4.96 K/UL (ref 1.8–8)
NEUTS SEG NFR BLD: 73 % (ref 32–75)
NRBC # BLD: 0.05 K/UL (ref 0–0.01)
NRBC BLD-RTO: 0.7 PER 100 WBC
NT PRO BNP: 451 PG/ML
PLATELET # BLD AUTO: 217 K/UL (ref 150–400)
PMV BLD AUTO: 9.9 FL (ref 8.9–12.9)
POTASSIUM SERPL-SCNC: 3.4 MMOL/L (ref 3.5–5.1)
PROCALCITONIN SERPL-MCNC: 0.05 NG/ML
PROT SERPL-MCNC: 6.1 G/DL (ref 6.4–8.2)
RBC # BLD AUTO: 4.9 M/UL (ref 3.8–5.2)
RBC MORPH BLD: ABNORMAL
SARS-COV-2 RNA RESP QL NAA+PROBE: NOT DETECTED
SERVICE CMNT-IMP: ABNORMAL
SERVICE CMNT-IMP: ABNORMAL
SODIUM SERPL-SCNC: 140 MMOL/L (ref 136–145)
SOURCE: ABNORMAL
TROPONIN I SERPL HS-MCNC: 25 NG/L (ref 0–51)
TROPONIN I SERPL HS-MCNC: 38 NG/L (ref 0–51)
WBC # BLD AUTO: 6.8 K/UL (ref 3.6–11)

## 2025-02-07 PROCEDURE — 6370000000 HC RX 637 (ALT 250 FOR IP): Performed by: EMERGENCY MEDICINE

## 2025-02-07 PROCEDURE — 80053 COMPREHEN METABOLIC PANEL: CPT

## 2025-02-07 PROCEDURE — 6360000002 HC RX W HCPCS: Performed by: EMERGENCY MEDICINE

## 2025-02-07 PROCEDURE — 2500000003 HC RX 250 WO HCPCS: Performed by: HOSPITALIST

## 2025-02-07 PROCEDURE — 94640 AIRWAY INHALATION TREATMENT: CPT

## 2025-02-07 PROCEDURE — 85025 COMPLETE CBC W/AUTO DIFF WBC: CPT

## 2025-02-07 PROCEDURE — 93005 ELECTROCARDIOGRAM TRACING: CPT | Performed by: EMERGENCY MEDICINE

## 2025-02-07 PROCEDURE — 87636 SARSCOV2 & INF A&B AMP PRB: CPT

## 2025-02-07 PROCEDURE — 83880 ASSAY OF NATRIURETIC PEPTIDE: CPT

## 2025-02-07 PROCEDURE — 84145 PROCALCITONIN (PCT): CPT

## 2025-02-07 PROCEDURE — 82962 GLUCOSE BLOOD TEST: CPT

## 2025-02-07 PROCEDURE — 96374 THER/PROPH/DIAG INJ IV PUSH: CPT

## 2025-02-07 PROCEDURE — 6360000002 HC RX W HCPCS: Performed by: HOSPITALIST

## 2025-02-07 PROCEDURE — 2580000003 HC RX 258: Performed by: HOSPITALIST

## 2025-02-07 PROCEDURE — 6370000000 HC RX 637 (ALT 250 FOR IP): Performed by: HOSPITALIST

## 2025-02-07 PROCEDURE — 71045 X-RAY EXAM CHEST 1 VIEW: CPT

## 2025-02-07 PROCEDURE — 87040 BLOOD CULTURE FOR BACTERIA: CPT

## 2025-02-07 PROCEDURE — 1100000003 HC PRIVATE W/ TELEMETRY

## 2025-02-07 PROCEDURE — 83605 ASSAY OF LACTIC ACID: CPT

## 2025-02-07 PROCEDURE — 71250 CT THORAX DX C-: CPT

## 2025-02-07 PROCEDURE — 36415 COLL VENOUS BLD VENIPUNCTURE: CPT

## 2025-02-07 PROCEDURE — 99285 EMERGENCY DEPT VISIT HI MDM: CPT

## 2025-02-07 PROCEDURE — 83690 ASSAY OF LIPASE: CPT

## 2025-02-07 PROCEDURE — 84484 ASSAY OF TROPONIN QUANT: CPT

## 2025-02-07 RX ORDER — BUDESONIDE 0.25 MG/2ML
0.25 INHALANT ORAL
Status: DISCONTINUED | OUTPATIENT
Start: 2025-02-07 | End: 2025-02-11 | Stop reason: HOSPADM

## 2025-02-07 RX ORDER — ASPIRIN 81 MG/1
81 TABLET, CHEWABLE ORAL DAILY
Status: DISCONTINUED | OUTPATIENT
Start: 2025-02-07 | End: 2025-02-11 | Stop reason: HOSPADM

## 2025-02-07 RX ORDER — FUROSEMIDE 10 MG/ML
40 INJECTION INTRAMUSCULAR; INTRAVENOUS 2 TIMES DAILY
Status: DISCONTINUED | OUTPATIENT
Start: 2025-02-07 | End: 2025-02-09

## 2025-02-07 RX ORDER — OSELTAMIVIR PHOSPHATE 75 MG/1
75 CAPSULE ORAL ONCE
Status: COMPLETED | OUTPATIENT
Start: 2025-02-07 | End: 2025-02-07

## 2025-02-07 RX ORDER — INSULIN GLARGINE 100 [IU]/ML
40 INJECTION, SOLUTION SUBCUTANEOUS DAILY
Status: DISCONTINUED | OUTPATIENT
Start: 2025-02-08 | End: 2025-02-11 | Stop reason: HOSPADM

## 2025-02-07 RX ORDER — FUROSEMIDE 10 MG/ML
40 INJECTION INTRAMUSCULAR; INTRAVENOUS ONCE
Status: DISCONTINUED | OUTPATIENT
Start: 2025-02-07 | End: 2025-02-11 | Stop reason: HOSPADM

## 2025-02-07 RX ORDER — IPRATROPIUM BROMIDE AND ALBUTEROL SULFATE 2.5; .5 MG/3ML; MG/3ML
1 SOLUTION RESPIRATORY (INHALATION)
Status: DISCONTINUED | OUTPATIENT
Start: 2025-02-07 | End: 2025-02-07

## 2025-02-07 RX ORDER — ACETAMINOPHEN 325 MG/1
650 TABLET ORAL EVERY 4 HOURS PRN
Status: DISCONTINUED | OUTPATIENT
Start: 2025-02-07 | End: 2025-02-11 | Stop reason: HOSPADM

## 2025-02-07 RX ORDER — NITROGLYCERIN 0.4 MG/1
0.4 TABLET SUBLINGUAL EVERY 5 MIN PRN
Status: DISCONTINUED | OUTPATIENT
Start: 2025-02-07 | End: 2025-02-11 | Stop reason: HOSPADM

## 2025-02-07 RX ORDER — SENNA AND DOCUSATE SODIUM 50; 8.6 MG/1; MG/1
2 TABLET, FILM COATED ORAL DAILY
Status: DISCONTINUED | OUTPATIENT
Start: 2025-02-07 | End: 2025-02-11 | Stop reason: HOSPADM

## 2025-02-07 RX ORDER — IPRATROPIUM BROMIDE AND ALBUTEROL SULFATE 2.5; .5 MG/3ML; MG/3ML
1 SOLUTION RESPIRATORY (INHALATION)
Status: DISCONTINUED | OUTPATIENT
Start: 2025-02-07 | End: 2025-02-08

## 2025-02-07 RX ORDER — METHOCARBAMOL 500 MG/1
750 TABLET, FILM COATED ORAL 3 TIMES DAILY PRN
Status: DISCONTINUED | OUTPATIENT
Start: 2025-02-07 | End: 2025-02-11 | Stop reason: HOSPADM

## 2025-02-07 RX ORDER — INSULIN LISPRO 100 [IU]/ML
0-8 INJECTION, SOLUTION INTRAVENOUS; SUBCUTANEOUS
Status: DISCONTINUED | OUTPATIENT
Start: 2025-02-07 | End: 2025-02-11 | Stop reason: HOSPADM

## 2025-02-07 RX ORDER — OSELTAMIVIR PHOSPHATE 75 MG/1
75 CAPSULE ORAL 2 TIMES DAILY
Status: DISCONTINUED | OUTPATIENT
Start: 2025-02-07 | End: 2025-02-11 | Stop reason: HOSPADM

## 2025-02-07 RX ORDER — IPRATROPIUM BROMIDE AND ALBUTEROL SULFATE 2.5; .5 MG/3ML; MG/3ML
1 SOLUTION RESPIRATORY (INHALATION) EVERY 4 HOURS PRN
Status: DISCONTINUED | OUTPATIENT
Start: 2025-02-07 | End: 2025-02-11 | Stop reason: HOSPADM

## 2025-02-07 RX ORDER — METOPROLOL TARTRATE 25 MG/1
25 TABLET, FILM COATED ORAL 2 TIMES DAILY
Status: DISCONTINUED | OUTPATIENT
Start: 2025-02-07 | End: 2025-02-11 | Stop reason: HOSPADM

## 2025-02-07 RX ORDER — PANTOPRAZOLE SODIUM 40 MG/1
40 TABLET, DELAYED RELEASE ORAL
Status: DISCONTINUED | OUTPATIENT
Start: 2025-02-08 | End: 2025-02-11 | Stop reason: HOSPADM

## 2025-02-07 RX ORDER — ARFORMOTEROL TARTRATE 15 UG/2ML
15 SOLUTION RESPIRATORY (INHALATION)
Status: DISCONTINUED | OUTPATIENT
Start: 2025-02-07 | End: 2025-02-11 | Stop reason: HOSPADM

## 2025-02-07 RX ORDER — ATORVASTATIN CALCIUM 20 MG/1
10 TABLET, FILM COATED ORAL DAILY
Status: DISCONTINUED | OUTPATIENT
Start: 2025-02-07 | End: 2025-02-11 | Stop reason: HOSPADM

## 2025-02-07 RX ORDER — POTASSIUM CHLORIDE 1500 MG/1
40 TABLET, EXTENDED RELEASE ORAL ONCE
Status: DISCONTINUED | OUTPATIENT
Start: 2025-02-07 | End: 2025-02-11 | Stop reason: HOSPADM

## 2025-02-07 RX ORDER — CETIRIZINE HYDROCHLORIDE 10 MG/1
10 TABLET ORAL DAILY
Status: DISCONTINUED | OUTPATIENT
Start: 2025-02-07 | End: 2025-02-11 | Stop reason: HOSPADM

## 2025-02-07 RX ORDER — FUROSEMIDE 10 MG/ML
40 INJECTION INTRAMUSCULAR; INTRAVENOUS
Status: COMPLETED | OUTPATIENT
Start: 2025-02-07 | End: 2025-02-07

## 2025-02-07 RX ORDER — DEXAMETHASONE SODIUM PHOSPHATE 10 MG/ML
10 INJECTION, SOLUTION INTRAMUSCULAR; INTRAVENOUS
Status: COMPLETED | OUTPATIENT
Start: 2025-02-07 | End: 2025-02-07

## 2025-02-07 RX ORDER — ANASTROZOLE 1 MG/1
1 TABLET ORAL DAILY
Status: DISCONTINUED | OUTPATIENT
Start: 2025-02-07 | End: 2025-02-11 | Stop reason: HOSPADM

## 2025-02-07 RX ORDER — METHADONE HYDROCHLORIDE 10 MG/1
5 TABLET ORAL 2 TIMES DAILY
Status: DISCONTINUED | OUTPATIENT
Start: 2025-02-07 | End: 2025-02-11 | Stop reason: HOSPADM

## 2025-02-07 RX ORDER — ENOXAPARIN SODIUM 150 MG/ML
1 INJECTION SUBCUTANEOUS 2 TIMES DAILY
Status: DISCONTINUED | OUTPATIENT
Start: 2025-02-07 | End: 2025-02-11 | Stop reason: HOSPADM

## 2025-02-07 RX ORDER — HYDROMORPHONE HYDROCHLORIDE 2 MG/1
2 TABLET ORAL EVERY 4 HOURS PRN
Status: DISCONTINUED | OUTPATIENT
Start: 2025-02-07 | End: 2025-02-11 | Stop reason: HOSPADM

## 2025-02-07 RX ADMIN — OSELTAMIVIR PHOSPHATE 75 MG: 75 CAPSULE ORAL at 12:52

## 2025-02-07 RX ADMIN — IPRATROPIUM BROMIDE AND ALBUTEROL SULFATE 1 DOSE: 2.5; .5 SOLUTION RESPIRATORY (INHALATION) at 20:32

## 2025-02-07 RX ADMIN — IPRATROPIUM BROMIDE AND ALBUTEROL SULFATE 1 DOSE: 2.5; .5 SOLUTION RESPIRATORY (INHALATION) at 13:43

## 2025-02-07 RX ADMIN — WATER 40 MG: 1 INJECTION INTRAMUSCULAR; INTRAVENOUS; SUBCUTANEOUS at 21:34

## 2025-02-07 RX ADMIN — IPRATROPIUM BROMIDE AND ALBUTEROL SULFATE 1 DOSE: 2.5; .5 SOLUTION RESPIRATORY (INHALATION) at 15:44

## 2025-02-07 RX ADMIN — DEXAMETHASONE SODIUM PHOSPHATE 10 MG: 10 INJECTION, SOLUTION INTRAMUSCULAR; INTRAVENOUS at 03:31

## 2025-02-07 RX ADMIN — ARFORMOTEROL TARTRATE 15 MCG: 15 SOLUTION RESPIRATORY (INHALATION) at 20:37

## 2025-02-07 RX ADMIN — ANASTROZOLE 1 MG: 1 TABLET, COATED ORAL at 18:23

## 2025-02-07 RX ADMIN — ATORVASTATIN CALCIUM 10 MG: 20 TABLET, FILM COATED ORAL at 16:04

## 2025-02-07 RX ADMIN — ASPIRIN 81 MG: 81 TABLET, CHEWABLE ORAL at 16:05

## 2025-02-07 RX ADMIN — SENNOSIDES AND DOCUSATE SODIUM 2 TABLET: 50; 8.6 TABLET ORAL at 16:05

## 2025-02-07 RX ADMIN — OSELTAMIVIR PHOSPHATE 75 MG: 75 CAPSULE ORAL at 21:33

## 2025-02-07 RX ADMIN — FUROSEMIDE 40 MG: 10 INJECTION, SOLUTION INTRAMUSCULAR; INTRAVENOUS at 17:03

## 2025-02-07 RX ADMIN — METOPROLOL TARTRATE 25 MG: 25 TABLET, FILM COATED ORAL at 21:33

## 2025-02-07 RX ADMIN — WATER 1000 MG: 1 INJECTION INTRAMUSCULAR; INTRAVENOUS; SUBCUTANEOUS at 16:04

## 2025-02-07 RX ADMIN — AZITHROMYCIN MONOHYDRATE 500 MG: 500 INJECTION, POWDER, LYOPHILIZED, FOR SOLUTION INTRAVENOUS at 16:11

## 2025-02-07 RX ADMIN — CETIRIZINE HYDROCHLORIDE 10 MG: 10 TABLET, FILM COATED ORAL at 16:04

## 2025-02-07 RX ADMIN — METOPROLOL TARTRATE 25 MG: 25 TABLET, FILM COATED ORAL at 16:04

## 2025-02-07 RX ADMIN — METHADONE HYDROCHLORIDE 5 MG: 10 TABLET ORAL at 21:32

## 2025-02-07 RX ADMIN — BUDESONIDE 250 MCG: 0.25 INHALANT RESPIRATORY (INHALATION) at 20:37

## 2025-02-07 RX ADMIN — FUROSEMIDE 40 MG: 10 INJECTION, SOLUTION INTRAMUSCULAR; INTRAVENOUS at 12:49

## 2025-02-07 RX ADMIN — METHADONE HYDROCHLORIDE 5 MG: 10 TABLET ORAL at 15:57

## 2025-02-07 RX ADMIN — INSULIN LISPRO 2 UNITS: 100 INJECTION, SOLUTION INTRAVENOUS; SUBCUTANEOUS at 17:04

## 2025-02-07 ASSESSMENT — PAIN DESCRIPTION - ORIENTATION: ORIENTATION: RIGHT;LEFT;MID

## 2025-02-07 ASSESSMENT — PAIN SCALES - GENERAL
PAINLEVEL_OUTOF10: 0
PAINLEVEL_OUTOF10: 0
PAINLEVEL_OUTOF10: 5
PAINLEVEL_OUTOF10: 5

## 2025-02-07 ASSESSMENT — PAIN DESCRIPTION - DESCRIPTORS: DESCRIPTORS: ACHING

## 2025-02-07 ASSESSMENT — PAIN - FUNCTIONAL ASSESSMENT
PAIN_FUNCTIONAL_ASSESSMENT: PREVENTS OR INTERFERES SOME ACTIVE ACTIVITIES AND ADLS
PAIN_FUNCTIONAL_ASSESSMENT: 0-10

## 2025-02-07 ASSESSMENT — PAIN DESCRIPTION - LOCATION
LOCATION: GENERALIZED
LOCATION: OTHER (COMMENT)

## 2025-02-07 ASSESSMENT — PAIN DESCRIPTION - DIRECTION: RADIATING_TOWARDS: GENERALIZED

## 2025-02-07 ASSESSMENT — PAIN DESCRIPTION - PAIN TYPE
TYPE: CHRONIC PAIN
TYPE: CHRONIC PAIN

## 2025-02-07 ASSESSMENT — PAIN DESCRIPTION - FREQUENCY: FREQUENCY: CONTINUOUS

## 2025-02-07 ASSESSMENT — PAIN DESCRIPTION - ONSET: ONSET: ON-GOING

## 2025-02-07 NOTE — H&P
chronic 5 L of O2 with O2 saturation 92% on room air at rest.  Blood work significant for potassium of 3.4, CBC essentially normal, rapid influenza A positive, influenza B negative.  Chest x-ray revealed Leftward patient rotation significantly limits evaluation. Moderate edema pattern. Suspected left mid to lower lung collapse/consolidation. Suspected moderate to large left pleural effusion.       We were asked to admit for work up and evaluation of the above problems.     Past Medical History:   Diagnosis Date    Arthritis     Asthma     Breast lump     CAD (coronary artery disease)     Cancer (HCC)     breast cancer    Congestive heart failure (HCC)     COPD (chronic obstructive pulmonary disease) (HCC)     Diabetes (HCC)     Hypertension     Morbid obesity with BMI of 70 and over, adult     NSTEMI (non-ST elevated myocardial infarction) ([facility]) 2012    NSTEMI (non-ST elevated myocardial infarction) ([facility])     SVT (supraventricular tachycardia) ([facility])         Past Surgical History:   Procedure Laterality Date     SECTION      ORTHOPEDIC SURGERY      OTHER SURGICAL HISTORY      cyst removed from back    NM UNLISTED PROCEDURE CARDIAC SURGERY      Stents    US BREAST BIOPSY W LOC DEVICE 1ST LESION RIGHT Right 2018    US BREAST NEEDLE BIOPSY RIGHT 2018 [facility] RAD US       Social History     Tobacco Use    Smoking status: Former     Current packs/day: 0.25     Types: Cigarettes    Smokeless tobacco: Never    Tobacco comments:     Quit smoking: Patient states \"I  aint smoking no more d*mn cigarettes after yesterday\" 2018   Substance Use Topics    Alcohol use: No        Family History   Problem Relation Age of Onset    Heart Disease Mother     Heart Disease Father     Heart Disease Sister     Breast Cancer Maternal Grandmother     Breast Cancer Paternal Grandmother        No Known Allergies     Prior to Admission medications    Medication Sig Start Date End Date Taking? Authorizing Provider

## 2025-02-07 NOTE — ED NOTES
Assumed care of pt at this time from JULY Griffin. Pt on monitor x3.. Side rails up x2. Call bell in reach.

## 2025-02-07 NOTE — DISCHARGE INSTRUCTIONS
It was a pleasure taking care of you in our Emergency Department today.  We know that when you come to [facility], you are entrusting us with your health, comfort, and safety.  Our physicians and nurses honor that trust, and truly appreciate the opportunity to care for you and your loved ones.      We also value your feedback.  If you receive a survey about your Emergency Department experience today, please fill it out.  We care about our patients' feedback, and we listen to what you have to say.  Thank you!      Dr. Milagro Adams MD.

## 2025-02-07 NOTE — ED PROVIDER NOTES
EMERGENCY DEPARTMENT HISTORY AND PHYSICAL EXAM     ----------------------------------------------------------------------------  Please note that this dictation was completed with [employer], the [employer] voice recognition software.  Quite often unanticipated grammatical, syntax, homophones, and other interpretive errors are inadvertently transcribed by the computer software.  Please disregard these errors.  Please excuse any errors that have escaped final proofreading  ----------------------------------------------------------------------------      Date: 2/7/2025  Patient Name: Jessica Mane      HISTORY OF PRESENT ILLNESS     Chief Complaint   Patient presents with    Shortness of Breath     BIBEMS with cc of difficulty breathing that started this evening. Pt has a hx of  COPD, asthma, lung cancer and CHF. Normally on 5L and was sating at 89% which is her baseline. EMS placed her on 6L for comfort and was sating at 92%. Patient administered multiple breathing treatments, last one was done around 0130.       History obtainted from:  Patient    Other independent source of history: EMS    HPI: Jessica Mane is a 54 y.o. female, with significant pmhx of COPD, anemia, diabetes, CAD, hypertension, severe morbid obesity, congestive heart failure who presents via EMS to the ED with c/o acute on chronic shortness of breath.  EMS found the patient wearing 5 L nasal cannula and satting 89%.  Bumped her to 6 L which improved to 92%.  Reportedly self administered multiple breathing treatments with the last one being at 1:30 in the morning.  Patient denies chest pain, fever or chills.  Notes that she is chronically hot and is requesting that we turn on her personal fan that she brought with her.  Patient is bedbound at baseline and does not get up to use bedside commode.      PCP: No primary care provider on file.    Allergy List:   No Known Allergies      CURRENT MEDICATIONS      Previous Medications    ACETAMINOPHEN

## 2025-02-07 NOTE — ED NOTES
Bed bugs were found on the patient. So this nurse and 4 other ED staff decond th patient and the room and transferred the patient to another room. And closed of the previous room the patient was in.

## 2025-02-07 NOTE — CARE COORDINATION
Chart reviewed for admission - verified pt is presently affiliated w[facility].     Brenda Throckmorton RN  [facility] Case Management  /[phone]

## 2025-02-07 NOTE — ED NOTES
Report given to JULY Kearney. Nurse was informed of reason for arrival, vitals, labs, medications, orders, procedures, results, anything left pending and current plan of action. Questions were asked and received prior to departure from the patient.

## 2025-02-07 NOTE — CARE COORDINATION
CM reviewed chart, attempted to complete initial CM assessment. Due to isolation precautions (influenza) CM attempted to reach pt via room phone; no answer. CM placed call to pt's MPOA per AMD, Flora Painter (Chow)/\[facility]\"" [phone], no answer, VM left.    Per chart review, pt resides in apartment with nephew and has 56 hours/week of Medicaid paid caregivers. Pt is also open with [facility], will need new orders to reinstate hospice services at d/c.     Pt has DDNR, POST, and AMD on file.    Pt is known to have access to the following DME: home oxygen (6L at baseline supplied by [email or web address]), hospital bed, wheelchair, and bedside commode. Pt is reportedly bed-bound presently, dependent with ADLs, and not transferring to [facility]. Anticipate need for BLS transport at d/c.    CM will re-attempt to reach pt or speak with pt's sister in law to complete full assessment.    NOLAN Domingo.  Care Manager, [facility]  [phone]/Available on [employer]

## 2025-02-07 NOTE — PLAN OF CARE
Problem: Chronic Conditions and Co-morbidities  Goal: Patient's chronic conditions and co-morbidity symptoms are monitored and maintained or improved  Outcome: Progressing  Flowsheets (Taken 2/7/2025 1454 by Monalisa Leyva, RN)  Care Plan - Patient's Chronic Conditions and Co-Morbidity Symptoms are Monitored and Maintained or Improved:   Monitor and assess patient's chronic conditions and comorbid symptoms for stability, deterioration, or improvement   Collaborate with multidisciplinary team to address chronic and comorbid conditions and prevent exacerbation or deterioration   Update acute care plan with appropriate goals if chronic or comorbid symptoms are exacerbated and prevent overall improvement and discharge     Problem: Discharge Planning  Goal: Discharge to home or other facility with appropriate resources  Outcome: Progressing  Flowsheets (Taken 2/7/2025 1454 by Monalisa Leyva, RN)  Discharge to home or other facility with appropriate resources:   Identify barriers to discharge with patient and caregiver   Arrange for needed discharge resources and transportation as appropriate   Identify discharge learning needs (meds, wound care, etc)   Refer to discharge planning if patient needs post-hospital services based on physician order or complex needs related to functional status, cognitive ability or social support system     Problem: Nutrition Deficit:  Goal: Optimize nutritional status  Outcome: Progressing

## 2025-02-08 LAB
ANION GAP SERPL CALC-SCNC: 8 MMOL/L (ref 2–12)
BUN SERPL-MCNC: 11 MG/DL (ref 6–20)
BUN/CREAT SERPL: 12 (ref 12–20)
CALCIUM SERPL-MCNC: 10.1 MG/DL (ref 8.5–10.1)
CHLORIDE SERPL-SCNC: 96 MMOL/L (ref 97–108)
CO2 SERPL-SCNC: 30 MMOL/L (ref 21–32)
CREAT SERPL-MCNC: 0.91 MG/DL (ref 0.55–1.02)
GLUCOSE BLD STRIP.AUTO-MCNC: 195 MG/DL (ref 65–117)
GLUCOSE BLD STRIP.AUTO-MCNC: 197 MG/DL (ref 65–117)
GLUCOSE BLD STRIP.AUTO-MCNC: 255 MG/DL (ref 65–117)
GLUCOSE BLD STRIP.AUTO-MCNC: 293 MG/DL (ref 65–117)
GLUCOSE SERPL-MCNC: 270 MG/DL (ref 65–100)
POTASSIUM SERPL-SCNC: 4 MMOL/L (ref 3.5–5.1)
SERVICE CMNT-IMP: ABNORMAL
SODIUM SERPL-SCNC: 134 MMOL/L (ref 136–145)

## 2025-02-08 PROCEDURE — 1100000003 HC PRIVATE W/ TELEMETRY

## 2025-02-08 PROCEDURE — 80048 BASIC METABOLIC PNL TOTAL CA: CPT

## 2025-02-08 PROCEDURE — 2580000003 HC RX 258: Performed by: HOSPITALIST

## 2025-02-08 PROCEDURE — 2700000000 HC OXYGEN THERAPY PER DAY

## 2025-02-08 PROCEDURE — 2500000003 HC RX 250 WO HCPCS: Performed by: HOSPITALIST

## 2025-02-08 PROCEDURE — 6360000002 HC RX W HCPCS: Performed by: HOSPITALIST

## 2025-02-08 PROCEDURE — 36415 COLL VENOUS BLD VENIPUNCTURE: CPT

## 2025-02-08 PROCEDURE — 94640 AIRWAY INHALATION TREATMENT: CPT

## 2025-02-08 PROCEDURE — 82962 GLUCOSE BLOOD TEST: CPT

## 2025-02-08 PROCEDURE — 6370000000 HC RX 637 (ALT 250 FOR IP): Performed by: HOSPITALIST

## 2025-02-08 PROCEDURE — 94761 N-INVAS EAR/PLS OXIMETRY MLT: CPT

## 2025-02-08 RX ORDER — IPRATROPIUM BROMIDE AND ALBUTEROL SULFATE 2.5; .5 MG/3ML; MG/3ML
1 SOLUTION RESPIRATORY (INHALATION)
Status: DISCONTINUED | OUTPATIENT
Start: 2025-02-09 | End: 2025-02-11 | Stop reason: HOSPADM

## 2025-02-08 RX ADMIN — INSULIN LISPRO 2 UNITS: 100 INJECTION, SOLUTION INTRAVENOUS; SUBCUTANEOUS at 08:24

## 2025-02-08 RX ADMIN — SENNOSIDES AND DOCUSATE SODIUM 2 TABLET: 50; 8.6 TABLET ORAL at 08:25

## 2025-02-08 RX ADMIN — OSELTAMIVIR PHOSPHATE 75 MG: 75 CAPSULE ORAL at 21:27

## 2025-02-08 RX ADMIN — IPRATROPIUM BROMIDE AND ALBUTEROL SULFATE 1 DOSE: 2.5; .5 SOLUTION RESPIRATORY (INHALATION) at 05:43

## 2025-02-08 RX ADMIN — IPRATROPIUM BROMIDE AND ALBUTEROL SULFATE 1 DOSE: 2.5; .5 SOLUTION RESPIRATORY (INHALATION) at 13:17

## 2025-02-08 RX ADMIN — INSULIN LISPRO 2 UNITS: 100 INJECTION, SOLUTION INTRAVENOUS; SUBCUTANEOUS at 21:23

## 2025-02-08 RX ADMIN — INSULIN LISPRO 4 UNITS: 100 INJECTION, SOLUTION INTRAVENOUS; SUBCUTANEOUS at 11:08

## 2025-02-08 RX ADMIN — METOPROLOL TARTRATE 25 MG: 25 TABLET, FILM COATED ORAL at 08:27

## 2025-02-08 RX ADMIN — CETIRIZINE HYDROCHLORIDE 10 MG: 10 TABLET, FILM COATED ORAL at 08:25

## 2025-02-08 RX ADMIN — ARFORMOTEROL TARTRATE 15 MCG: 15 SOLUTION RESPIRATORY (INHALATION) at 20:35

## 2025-02-08 RX ADMIN — ENOXAPARIN SODIUM 150 MG: 150 INJECTION SUBCUTANEOUS at 21:25

## 2025-02-08 RX ADMIN — AZITHROMYCIN MONOHYDRATE 500 MG: 500 INJECTION, POWDER, LYOPHILIZED, FOR SOLUTION INTRAVENOUS at 12:31

## 2025-02-08 RX ADMIN — IPRATROPIUM BROMIDE AND ALBUTEROL SULFATE 1 DOSE: 2.5; .5 SOLUTION RESPIRATORY (INHALATION) at 16:06

## 2025-02-08 RX ADMIN — ATORVASTATIN CALCIUM 10 MG: 20 TABLET, FILM COATED ORAL at 08:26

## 2025-02-08 RX ADMIN — WATER 40 MG: 1 INJECTION INTRAMUSCULAR; INTRAVENOUS; SUBCUTANEOUS at 21:28

## 2025-02-08 RX ADMIN — IPRATROPIUM BROMIDE AND ALBUTEROL SULFATE 1 DOSE: 2.5; .5 SOLUTION RESPIRATORY (INHALATION) at 20:30

## 2025-02-08 RX ADMIN — METHADONE HYDROCHLORIDE 5 MG: 10 TABLET ORAL at 08:26

## 2025-02-08 RX ADMIN — ASPIRIN 81 MG: 81 TABLET, CHEWABLE ORAL at 08:23

## 2025-02-08 RX ADMIN — FUROSEMIDE 40 MG: 10 INJECTION, SOLUTION INTRAMUSCULAR; INTRAVENOUS at 08:25

## 2025-02-08 RX ADMIN — ENOXAPARIN SODIUM 150 MG: 150 INJECTION SUBCUTANEOUS at 08:32

## 2025-02-08 RX ADMIN — ARFORMOTEROL TARTRATE 15 MCG: 15 SOLUTION RESPIRATORY (INHALATION) at 07:44

## 2025-02-08 RX ADMIN — ANASTROZOLE 1 MG: 1 TABLET, COATED ORAL at 08:27

## 2025-02-08 RX ADMIN — BUDESONIDE 250 MCG: 0.25 INHALANT RESPIRATORY (INHALATION) at 07:44

## 2025-02-08 RX ADMIN — PANTOPRAZOLE SODIUM 40 MG: 40 TABLET, DELAYED RELEASE ORAL at 08:28

## 2025-02-08 RX ADMIN — WATER 40 MG: 1 INJECTION INTRAMUSCULAR; INTRAVENOUS; SUBCUTANEOUS at 08:24

## 2025-02-08 RX ADMIN — METOPROLOL TARTRATE 25 MG: 25 TABLET, FILM COATED ORAL at 21:27

## 2025-02-08 RX ADMIN — INSULIN LISPRO 4 UNITS: 100 INJECTION, SOLUTION INTRAVENOUS; SUBCUTANEOUS at 17:26

## 2025-02-08 RX ADMIN — WATER 1000 MG: 1 INJECTION INTRAMUSCULAR; INTRAVENOUS; SUBCUTANEOUS at 14:20

## 2025-02-08 RX ADMIN — OSELTAMIVIR PHOSPHATE 75 MG: 75 CAPSULE ORAL at 08:27

## 2025-02-08 RX ADMIN — HYDROMORPHONE HYDROCHLORIDE 2 MG: 2 TABLET ORAL at 01:41

## 2025-02-08 RX ADMIN — BUDESONIDE 250 MCG: 0.25 INHALANT RESPIRATORY (INHALATION) at 20:35

## 2025-02-08 RX ADMIN — IPRATROPIUM BROMIDE AND ALBUTEROL SULFATE 1 DOSE: 2.5; .5 SOLUTION RESPIRATORY (INHALATION) at 07:43

## 2025-02-08 RX ADMIN — METHADONE HYDROCHLORIDE 5 MG: 10 TABLET ORAL at 21:26

## 2025-02-08 RX ADMIN — INSULIN GLARGINE 40 UNITS: 100 INJECTION, SOLUTION SUBCUTANEOUS at 08:22

## 2025-02-08 ASSESSMENT — PAIN DESCRIPTION - ONSET
ONSET: ON-GOING

## 2025-02-08 ASSESSMENT — PAIN DESCRIPTION - FREQUENCY
FREQUENCY: CONTINUOUS

## 2025-02-08 ASSESSMENT — PAIN DESCRIPTION - LOCATION
LOCATION: BACK;LEG;KNEE
LOCATION: BACK;LEG;SHOULDER
LOCATION: OTHER (COMMENT)

## 2025-02-08 ASSESSMENT — PAIN - FUNCTIONAL ASSESSMENT
PAIN_FUNCTIONAL_ASSESSMENT: PREVENTS OR INTERFERES SOME ACTIVE ACTIVITIES AND ADLS

## 2025-02-08 ASSESSMENT — PAIN SCALES - GENERAL
PAINLEVEL_OUTOF10: 4
PAINLEVEL_OUTOF10: 4
PAINLEVEL_OUTOF10: 5
PAINLEVEL_OUTOF10: 5
PAINLEVEL_OUTOF10: 7
PAINLEVEL_OUTOF10: 2

## 2025-02-08 ASSESSMENT — PAIN DESCRIPTION - DIRECTION
RADIATING_TOWARDS: GENERALIZED
RADIATING_TOWARDS: GENERALIZED

## 2025-02-08 ASSESSMENT — PAIN DESCRIPTION - DESCRIPTORS
DESCRIPTORS: ACHING

## 2025-02-08 ASSESSMENT — PAIN DESCRIPTION - PAIN TYPE
TYPE: CHRONIC PAIN

## 2025-02-08 ASSESSMENT — PAIN DESCRIPTION - ORIENTATION
ORIENTATION: RIGHT;LEFT;UPPER;LOWER
ORIENTATION: RIGHT;LEFT;UPPER;LOWER
ORIENTATION: RIGHT;LEFT;LOWER;UPPER

## 2025-02-08 NOTE — CONSULTS
with the left breast, left lower lobe, and  heart falling dependently to the left. Aside from minimal dependent atelectasis,  the right lung is clear. There is debris in the distal left mainstem and left  upper lobe bronchi. The central airways are otherwise patent. Trace left pleural  effusion. No pneumothorax.    Diffuse sclerotic osseous metastases are probably from right breast carcinoma,  as there are multiple small masses in the right breast. The superior-most  contains a punctate hyperdensity which is probably a biopsy clip.    The heart is at the upper limits of normal in size. Mitral valve calcification  is moderate. The ascending thoracic aorta is aneurysmal (41 mm). LAD and LCx  coronary calcification is moderate to severe. No thoracic lymphadenopathy. The  SVC is diminutive/slitlike (301-19). There is diffuse muscular atrophy. Multiple  left renal calculi are nonobstructing. Incidental note is made of an hepatic  cyst. The visualized unenhanced upper abdomen is otherwise normal.  Impression: 1. Subtotal atelectasis of the left lower lobe and lingula. Some of this is  positional and some of it is chronic.  2. Debris in the distal left mainstem and left upper lobe bronchi.  3. Diffuse sclerotic osseous metastases, likely from multicentric right breast  carcinoma.  4. Ascending thoracic aortic aneurysm (41 mm).  5. Moderate to severe LAD and LCx coronary calcification.  6. Diminutive/slitlike SVC. This may be due to dehydration, though the IVC is  normal caliber.    Electronically signed by Santos Glaser  XR CHEST PORTABLE  Narrative: EXAM:  XR CHEST PORTABLE    INDICATION: CP/sob    COMPARISON: Chest radiograph 1/5/2025, CTA chest 11/25/2024    TECHNIQUE: Upright portable chest AP view    FINDINGS:     Leftward patient rotation significantly limits evaluation. Cardiomediastinal  silhouette is likely stably enlarged. Moderate edema pattern. Suspected left mid  to lower lung collapse/consolidation.

## 2025-02-09 LAB
ANION GAP SERPL CALC-SCNC: 5 MMOL/L (ref 2–12)
BASOPHILS # BLD: 0 K/UL (ref 0–0.1)
BASOPHILS NFR BLD: 0 % (ref 0–1)
BUN SERPL-MCNC: 13 MG/DL (ref 6–20)
BUN/CREAT SERPL: 17 (ref 12–20)
CALCIUM SERPL-MCNC: 9.9 MG/DL (ref 8.5–10.1)
CHLORIDE SERPL-SCNC: 94 MMOL/L (ref 97–108)
CO2 SERPL-SCNC: 34 MMOL/L (ref 21–32)
CREAT SERPL-MCNC: 0.77 MG/DL (ref 0.55–1.02)
DIFFERENTIAL METHOD BLD: ABNORMAL
EOSINOPHIL # BLD: 0 K/UL (ref 0–0.4)
EOSINOPHIL NFR BLD: 0 % (ref 0–7)
ERYTHROCYTE [DISTWIDTH] IN BLOOD BY AUTOMATED COUNT: 13.5 % (ref 11.5–14.5)
GLUCOSE BLD STRIP.AUTO-MCNC: 194 MG/DL (ref 65–117)
GLUCOSE BLD STRIP.AUTO-MCNC: 239 MG/DL (ref 65–117)
GLUCOSE BLD STRIP.AUTO-MCNC: 253 MG/DL (ref 65–117)
GLUCOSE BLD STRIP.AUTO-MCNC: 279 MG/DL (ref 65–117)
GLUCOSE SERPL-MCNC: 231 MG/DL (ref 65–100)
HCT VFR BLD AUTO: 42.1 % (ref 35–47)
HGB BLD-MCNC: 12.8 G/DL (ref 11.5–16)
IMM GRANULOCYTES # BLD AUTO: 0 K/UL (ref 0–0.04)
IMM GRANULOCYTES NFR BLD AUTO: 0 % (ref 0–0.5)
LYMPHOCYTES # BLD: 0.64 K/UL (ref 0.8–3.5)
LYMPHOCYTES NFR BLD: 6 % (ref 12–49)
MAGNESIUM SERPL-MCNC: 1.8 MG/DL (ref 1.6–2.4)
MCH RBC QN AUTO: 28.6 PG (ref 26–34)
MCHC RBC AUTO-ENTMCNC: 30.4 G/DL (ref 30–36.5)
MCV RBC AUTO: 94 FL (ref 80–99)
METAMYELOCYTES NFR BLD MANUAL: 2 %
MONOCYTES # BLD: 0.53 K/UL (ref 0–1)
MONOCYTES NFR BLD: 5 % (ref 5–13)
NEUTS BAND NFR BLD MANUAL: 5 %
NEUTS SEG # BLD: 9.22 K/UL (ref 1.8–8)
NEUTS SEG NFR BLD: 82 % (ref 32–75)
NRBC # BLD: 0.05 K/UL (ref 0–0.01)
NRBC BLD-RTO: 0.5 PER 100 WBC
PLATELET # BLD AUTO: 243 K/UL (ref 150–400)
PMV BLD AUTO: 9.5 FL (ref 8.9–12.9)
POTASSIUM SERPL-SCNC: 4.1 MMOL/L (ref 3.5–5.1)
RBC # BLD AUTO: 4.48 M/UL (ref 3.8–5.2)
RBC MORPH BLD: ABNORMAL
SERVICE CMNT-IMP: ABNORMAL
SODIUM SERPL-SCNC: 133 MMOL/L (ref 136–145)
WBC # BLD AUTO: 10.6 K/UL (ref 3.6–11)

## 2025-02-09 PROCEDURE — 6360000002 HC RX W HCPCS: Performed by: INTERNAL MEDICINE

## 2025-02-09 PROCEDURE — 94640 AIRWAY INHALATION TREATMENT: CPT

## 2025-02-09 PROCEDURE — 6370000000 HC RX 637 (ALT 250 FOR IP): Performed by: HOSPITALIST

## 2025-02-09 PROCEDURE — 6370000000 HC RX 637 (ALT 250 FOR IP): Performed by: INTERNAL MEDICINE

## 2025-02-09 PROCEDURE — 2700000000 HC OXYGEN THERAPY PER DAY

## 2025-02-09 PROCEDURE — 2500000003 HC RX 250 WO HCPCS: Performed by: INTERNAL MEDICINE

## 2025-02-09 PROCEDURE — 6360000002 HC RX W HCPCS: Performed by: HOSPITALIST

## 2025-02-09 PROCEDURE — 80048 BASIC METABOLIC PNL TOTAL CA: CPT

## 2025-02-09 PROCEDURE — 1100000003 HC PRIVATE W/ TELEMETRY

## 2025-02-09 PROCEDURE — 2500000003 HC RX 250 WO HCPCS: Performed by: HOSPITALIST

## 2025-02-09 PROCEDURE — 83735 ASSAY OF MAGNESIUM: CPT

## 2025-02-09 PROCEDURE — 94761 N-INVAS EAR/PLS OXIMETRY MLT: CPT

## 2025-02-09 PROCEDURE — 2580000003 HC RX 258: Performed by: HOSPITALIST

## 2025-02-09 PROCEDURE — 82962 GLUCOSE BLOOD TEST: CPT

## 2025-02-09 PROCEDURE — 36415 COLL VENOUS BLD VENIPUNCTURE: CPT

## 2025-02-09 PROCEDURE — 85025 COMPLETE CBC W/AUTO DIFF WBC: CPT

## 2025-02-09 RX ORDER — FUROSEMIDE 40 MG/1
40 TABLET ORAL 2 TIMES DAILY
Status: DISCONTINUED | OUTPATIENT
Start: 2025-02-09 | End: 2025-02-11 | Stop reason: HOSPADM

## 2025-02-09 RX ADMIN — IPRATROPIUM BROMIDE AND ALBUTEROL SULFATE 1 DOSE: 2.5; .5 SOLUTION RESPIRATORY (INHALATION) at 22:24

## 2025-02-09 RX ADMIN — SENNOSIDES AND DOCUSATE SODIUM 2 TABLET: 50; 8.6 TABLET ORAL at 08:15

## 2025-02-09 RX ADMIN — WATER 40 MG: 1 INJECTION INTRAMUSCULAR; INTRAVENOUS; SUBCUTANEOUS at 11:15

## 2025-02-09 RX ADMIN — METHADONE HYDROCHLORIDE 5 MG: 10 TABLET ORAL at 21:39

## 2025-02-09 RX ADMIN — AZITHROMYCIN MONOHYDRATE 500 MG: 500 INJECTION, POWDER, LYOPHILIZED, FOR SOLUTION INTRAVENOUS at 12:27

## 2025-02-09 RX ADMIN — ASPIRIN 81 MG: 81 TABLET, CHEWABLE ORAL at 08:14

## 2025-02-09 RX ADMIN — INSULIN GLARGINE 40 UNITS: 100 INJECTION, SOLUTION SUBCUTANEOUS at 08:13

## 2025-02-09 RX ADMIN — INSULIN LISPRO 2 UNITS: 100 INJECTION, SOLUTION INTRAVENOUS; SUBCUTANEOUS at 08:12

## 2025-02-09 RX ADMIN — ENOXAPARIN SODIUM 150 MG: 150 INJECTION SUBCUTANEOUS at 21:36

## 2025-02-09 RX ADMIN — IPRATROPIUM BROMIDE AND ALBUTEROL SULFATE 1 DOSE: 2.5; .5 SOLUTION RESPIRATORY (INHALATION) at 11:51

## 2025-02-09 RX ADMIN — ARFORMOTEROL TARTRATE 15 MCG: 15 SOLUTION RESPIRATORY (INHALATION) at 22:29

## 2025-02-09 RX ADMIN — INSULIN LISPRO 2 UNITS: 100 INJECTION, SOLUTION INTRAVENOUS; SUBCUTANEOUS at 11:15

## 2025-02-09 RX ADMIN — OSELTAMIVIR PHOSPHATE 75 MG: 75 CAPSULE ORAL at 21:38

## 2025-02-09 RX ADMIN — METHADONE HYDROCHLORIDE 5 MG: 10 TABLET ORAL at 08:15

## 2025-02-09 RX ADMIN — OSELTAMIVIR PHOSPHATE 75 MG: 75 CAPSULE ORAL at 08:13

## 2025-02-09 RX ADMIN — HYDROMORPHONE HYDROCHLORIDE 2 MG: 2 TABLET ORAL at 01:47

## 2025-02-09 RX ADMIN — METOPROLOL TARTRATE 25 MG: 25 TABLET, FILM COATED ORAL at 21:38

## 2025-02-09 RX ADMIN — ARFORMOTEROL TARTRATE 15 MCG: 15 SOLUTION RESPIRATORY (INHALATION) at 07:34

## 2025-02-09 RX ADMIN — PANTOPRAZOLE SODIUM 40 MG: 40 TABLET, DELAYED RELEASE ORAL at 08:13

## 2025-02-09 RX ADMIN — ATORVASTATIN CALCIUM 10 MG: 20 TABLET, FILM COATED ORAL at 08:15

## 2025-02-09 RX ADMIN — INSULIN LISPRO 4 UNITS: 100 INJECTION, SOLUTION INTRAVENOUS; SUBCUTANEOUS at 17:08

## 2025-02-09 RX ADMIN — BUDESONIDE 250 MCG: 0.25 INHALANT RESPIRATORY (INHALATION) at 22:29

## 2025-02-09 RX ADMIN — IPRATROPIUM BROMIDE AND ALBUTEROL SULFATE 1 DOSE: 2.5; .5 SOLUTION RESPIRATORY (INHALATION) at 07:34

## 2025-02-09 RX ADMIN — INSULIN LISPRO 4 UNITS: 100 INJECTION, SOLUTION INTRAVENOUS; SUBCUTANEOUS at 21:30

## 2025-02-09 RX ADMIN — ENOXAPARIN SODIUM 150 MG: 150 INJECTION SUBCUTANEOUS at 08:17

## 2025-02-09 RX ADMIN — WATER 1000 MG: 1 INJECTION INTRAMUSCULAR; INTRAVENOUS; SUBCUTANEOUS at 13:33

## 2025-02-09 RX ADMIN — BUDESONIDE 250 MCG: 0.25 INHALANT RESPIRATORY (INHALATION) at 07:34

## 2025-02-09 RX ADMIN — ANASTROZOLE 1 MG: 1 TABLET, COATED ORAL at 08:16

## 2025-02-09 RX ADMIN — FUROSEMIDE 40 MG: 40 TABLET ORAL at 18:11

## 2025-02-09 RX ADMIN — IPRATROPIUM BROMIDE AND ALBUTEROL SULFATE 1 DOSE: 2.5; .5 SOLUTION RESPIRATORY (INHALATION) at 15:23

## 2025-02-09 RX ADMIN — WATER 40 MG: 1 INJECTION INTRAMUSCULAR; INTRAVENOUS; SUBCUTANEOUS at 21:39

## 2025-02-09 RX ADMIN — METOPROLOL TARTRATE 25 MG: 25 TABLET, FILM COATED ORAL at 08:16

## 2025-02-09 RX ADMIN — CETIRIZINE HYDROCHLORIDE 10 MG: 10 TABLET, FILM COATED ORAL at 08:18

## 2025-02-09 RX ADMIN — HYDROMORPHONE HYDROCHLORIDE 2 MG: 2 TABLET ORAL at 18:23

## 2025-02-09 ASSESSMENT — PAIN SCALES - GENERAL
PAINLEVEL_OUTOF10: 8
PAINLEVEL_OUTOF10: 3
PAINLEVEL_OUTOF10: 0
PAINLEVEL_OUTOF10: 5
PAINLEVEL_OUTOF10: 3
PAINLEVEL_OUTOF10: 6
PAINLEVEL_OUTOF10: 4
PAINLEVEL_OUTOF10: 5
PAINLEVEL_OUTOF10: 5

## 2025-02-09 ASSESSMENT — PAIN DESCRIPTION - DESCRIPTORS
DESCRIPTORS: ACHING

## 2025-02-09 ASSESSMENT — PAIN DESCRIPTION - FREQUENCY
FREQUENCY: CONTINUOUS

## 2025-02-09 ASSESSMENT — PAIN - FUNCTIONAL ASSESSMENT
PAIN_FUNCTIONAL_ASSESSMENT: ACTIVITIES ARE NOT PREVENTED
PAIN_FUNCTIONAL_ASSESSMENT: PREVENTS OR INTERFERES SOME ACTIVE ACTIVITIES AND ADLS

## 2025-02-09 ASSESSMENT — PAIN DESCRIPTION - ORIENTATION
ORIENTATION: RIGHT
ORIENTATION: RIGHT;LEFT;LOWER
ORIENTATION: RIGHT;LEFT;INNER;LOWER
ORIENTATION: RIGHT

## 2025-02-09 ASSESSMENT — PAIN DESCRIPTION - PAIN TYPE
TYPE: CHRONIC PAIN

## 2025-02-09 ASSESSMENT — PAIN DESCRIPTION - LOCATION
LOCATION: SHOULDER;BACK
LOCATION: BACK;CHEST
LOCATION: BACK;KNEE;LEG
LOCATION: BACK;LEG;SHOULDER

## 2025-02-09 ASSESSMENT — PAIN DESCRIPTION - ONSET
ONSET: ON-GOING

## 2025-02-09 ASSESSMENT — PAIN DESCRIPTION - DIRECTION
RADIATING_TOWARDS: GENERALIZED
RADIATING_TOWARDS: GENERALIZED

## 2025-02-09 NOTE — PLAN OF CARE
Problem: Chronic Conditions and Co-morbidities  Goal: Patient's chronic conditions and co-morbidity symptoms are monitored and maintained or improved  2/9/2025 0116 by Anneliese Hayes RN  Outcome: Progressing  2/8/2025 1812 by Chelle Phan RN  Outcome: Progressing  Flowsheets (Taken 2/8/2025 0807)  Care Plan - Patient's Chronic Conditions and Co-Morbidity Symptoms are Monitored and Maintained or Improved: Collaborate with multidisciplinary team to address chronic and comorbid conditions and prevent exacerbation or deterioration     Problem: Discharge Planning  Goal: Discharge to home or other facility with appropriate resources  2/9/2025 0116 by Anneliese Hayes RN  Outcome: Progressing  2/8/2025 1812 by Chlele Phan RN  Outcome: Progressing  Flowsheets (Taken 2/8/2025 0807)  Discharge to home or other facility with appropriate resources: Identify barriers to discharge with patient and caregiver     Problem: Nutrition Deficit:  Goal: Optimize nutritional status  2/9/2025 0116 by Anneliese Hayes RN  Outcome: Progressing  2/8/2025 1812 by Chelle Phan RN  Outcome: Progressing     Problem: Skin/Tissue Integrity  Goal: Skin integrity remains intact  Description: 1.  Monitor for areas of redness and/or skin breakdown  2.  Assess vascular access sites hourly  3.  Every 4-6 hours minimum:  Change oxygen saturation probe site  4.  Every 4-6 hours:  If on nasal continuous positive airway pressure, respiratory therapy assess nares and determine need for appliance change or resting period  Recent Flowsheet Documentation  Taken 2/8/2025 2200 by Anneliese Hayes RN  Skin Integrity Remains Intact: Monitor for areas of redness and/or skin breakdown  2/8/2025 1812 by Chelle Phan RN  Outcome: Progressing  Flowsheets (Taken 2/8/2025 0807)  Skin Integrity Remains Intact: Monitor for areas of redness and/or skin breakdown     Problem: Safety - Adult  Goal: Free from fall injury  2/8/2025 1812 by Chelle Phan

## 2025-02-10 VITALS
DIASTOLIC BLOOD PRESSURE: 87 MMHG | HEART RATE: 84 BPM | WEIGHT: 293 LBS | RESPIRATION RATE: 18 BRPM | SYSTOLIC BLOOD PRESSURE: 141 MMHG | BODY MASS INDEX: 47.09 KG/M2 | HEIGHT: 66 IN | TEMPERATURE: 97.7 F | OXYGEN SATURATION: 95 %

## 2025-02-10 LAB
ANION GAP SERPL CALC-SCNC: 8 MMOL/L (ref 2–12)
BASOPHILS # BLD: 0 K/UL (ref 0–0.1)
BASOPHILS NFR BLD: 0 % (ref 0–1)
BUN SERPL-MCNC: 16 MG/DL (ref 6–20)
BUN/CREAT SERPL: 16 (ref 12–20)
CALCIUM SERPL-MCNC: 9.7 MG/DL (ref 8.5–10.1)
CHLORIDE SERPL-SCNC: 94 MMOL/L (ref 97–108)
CO2 SERPL-SCNC: 31 MMOL/L (ref 21–32)
CREAT SERPL-MCNC: 1.03 MG/DL (ref 0.55–1.02)
DIFFERENTIAL METHOD BLD: ABNORMAL
EOSINOPHIL # BLD: 0 K/UL (ref 0–0.4)
EOSINOPHIL NFR BLD: 0 % (ref 0–7)
ERYTHROCYTE [DISTWIDTH] IN BLOOD BY AUTOMATED COUNT: 13.3 % (ref 11.5–14.5)
GLUCOSE BLD STRIP.AUTO-MCNC: 257 MG/DL (ref 65–117)
GLUCOSE BLD STRIP.AUTO-MCNC: 282 MG/DL (ref 65–117)
GLUCOSE BLD STRIP.AUTO-MCNC: 291 MG/DL (ref 65–117)
GLUCOSE SERPL-MCNC: 290 MG/DL (ref 65–100)
HCT VFR BLD AUTO: 43.8 % (ref 35–47)
HGB BLD-MCNC: 13 G/DL (ref 11.5–16)
IMM GRANULOCYTES # BLD AUTO: 0 K/UL (ref 0–0.04)
IMM GRANULOCYTES NFR BLD AUTO: 0 % (ref 0–0.5)
LYMPHOCYTES # BLD: 0.37 K/UL (ref 0.8–3.5)
LYMPHOCYTES NFR BLD: 3 % (ref 12–49)
MAGNESIUM SERPL-MCNC: 1.9 MG/DL (ref 1.6–2.4)
MCH RBC QN AUTO: 28.5 PG (ref 26–34)
MCHC RBC AUTO-ENTMCNC: 29.7 G/DL (ref 30–36.5)
MCV RBC AUTO: 96.1 FL (ref 80–99)
METAMYELOCYTES NFR BLD MANUAL: 1 %
MONOCYTES # BLD: 0.49 K/UL (ref 0–1)
MONOCYTES NFR BLD: 4 % (ref 5–13)
NEUTS BAND NFR BLD MANUAL: 2 %
NEUTS SEG # BLD: 11.32 K/UL (ref 1.8–8)
NEUTS SEG NFR BLD: 90 % (ref 32–75)
NRBC # BLD: 0.03 K/UL (ref 0–0.01)
NRBC BLD-RTO: 0.2 PER 100 WBC
PLATELET # BLD AUTO: 290 K/UL (ref 150–400)
PMV BLD AUTO: 10.4 FL (ref 8.9–12.9)
POTASSIUM SERPL-SCNC: 4.6 MMOL/L (ref 3.5–5.1)
RBC # BLD AUTO: 4.56 M/UL (ref 3.8–5.2)
RBC MORPH BLD: ABNORMAL
SERVICE CMNT-IMP: ABNORMAL
SODIUM SERPL-SCNC: 133 MMOL/L (ref 136–145)
WBC # BLD AUTO: 12.3 K/UL (ref 3.6–11)

## 2025-02-10 PROCEDURE — 2500000003 HC RX 250 WO HCPCS: Performed by: INTERNAL MEDICINE

## 2025-02-10 PROCEDURE — 6370000000 HC RX 637 (ALT 250 FOR IP): Performed by: HOSPITALIST

## 2025-02-10 PROCEDURE — 83735 ASSAY OF MAGNESIUM: CPT

## 2025-02-10 PROCEDURE — 36415 COLL VENOUS BLD VENIPUNCTURE: CPT

## 2025-02-10 PROCEDURE — 6360000002 HC RX W HCPCS: Performed by: HOSPITALIST

## 2025-02-10 PROCEDURE — 94640 AIRWAY INHALATION TREATMENT: CPT

## 2025-02-10 PROCEDURE — 6370000000 HC RX 637 (ALT 250 FOR IP): Performed by: INTERNAL MEDICINE

## 2025-02-10 PROCEDURE — 6360000002 HC RX W HCPCS: Performed by: INTERNAL MEDICINE

## 2025-02-10 PROCEDURE — 82962 GLUCOSE BLOOD TEST: CPT

## 2025-02-10 PROCEDURE — 2580000003 HC RX 258: Performed by: INTERNAL MEDICINE

## 2025-02-10 PROCEDURE — 85025 COMPLETE CBC W/AUTO DIFF WBC: CPT

## 2025-02-10 PROCEDURE — 80048 BASIC METABOLIC PNL TOTAL CA: CPT

## 2025-02-10 RX ORDER — OSELTAMIVIR PHOSPHATE 75 MG/1
75 CAPSULE ORAL 2 TIMES DAILY
Qty: 4 CAPSULE | Refills: 0 | Status: SHIPPED | OUTPATIENT
Start: 2025-02-10 | End: 2025-02-12

## 2025-02-10 RX ORDER — PREDNISONE 20 MG/1
40 TABLET ORAL DAILY
Status: DISCONTINUED | OUTPATIENT
Start: 2025-02-10 | End: 2025-02-11 | Stop reason: HOSPADM

## 2025-02-10 RX ORDER — ALBUTEROL SULFATE 90 UG/1
2 INHALANT RESPIRATORY (INHALATION) 4 TIMES DAILY PRN
Qty: 18 G | Refills: 0 | Status: SHIPPED | OUTPATIENT
Start: 2025-02-10

## 2025-02-10 RX ORDER — PREDNISONE 20 MG/1
TABLET ORAL
Qty: 7 TABLET | Refills: 0 | Status: SHIPPED | OUTPATIENT
Start: 2025-02-11 | End: 2025-02-17

## 2025-02-10 RX ADMIN — IPRATROPIUM BROMIDE AND ALBUTEROL SULFATE 1 DOSE: 2.5; .5 SOLUTION RESPIRATORY (INHALATION) at 08:16

## 2025-02-10 RX ADMIN — BUDESONIDE 250 MCG: 0.25 INHALANT RESPIRATORY (INHALATION) at 08:09

## 2025-02-10 RX ADMIN — INSULIN LISPRO 4 UNITS: 100 INJECTION, SOLUTION INTRAVENOUS; SUBCUTANEOUS at 09:53

## 2025-02-10 RX ADMIN — ASPIRIN 81 MG: 81 TABLET, CHEWABLE ORAL at 10:52

## 2025-02-10 RX ADMIN — METOPROLOL TARTRATE 25 MG: 25 TABLET, FILM COATED ORAL at 10:52

## 2025-02-10 RX ADMIN — ANASTROZOLE 1 MG: 1 TABLET, COATED ORAL at 09:16

## 2025-02-10 RX ADMIN — IPRATROPIUM BROMIDE AND ALBUTEROL SULFATE 1 DOSE: 2.5; .5 SOLUTION RESPIRATORY (INHALATION) at 16:11

## 2025-02-10 RX ADMIN — INSULIN GLARGINE 40 UNITS: 100 INJECTION, SOLUTION SUBCUTANEOUS at 09:53

## 2025-02-10 RX ADMIN — IPRATROPIUM BROMIDE AND ALBUTEROL SULFATE 1 DOSE: 2.5; .5 SOLUTION RESPIRATORY (INHALATION) at 11:42

## 2025-02-10 RX ADMIN — HYDROMORPHONE HYDROCHLORIDE 2 MG: 2 TABLET ORAL at 15:54

## 2025-02-10 RX ADMIN — CETIRIZINE HYDROCHLORIDE 10 MG: 10 TABLET, FILM COATED ORAL at 09:15

## 2025-02-10 RX ADMIN — HYDROMORPHONE HYDROCHLORIDE 2 MG: 2 TABLET ORAL at 00:52

## 2025-02-10 RX ADMIN — FUROSEMIDE 40 MG: 40 TABLET ORAL at 10:52

## 2025-02-10 RX ADMIN — ARFORMOTEROL TARTRATE 15 MCG: 15 SOLUTION RESPIRATORY (INHALATION) at 08:09

## 2025-02-10 RX ADMIN — SENNOSIDES AND DOCUSATE SODIUM 2 TABLET: 50; 8.6 TABLET ORAL at 09:15

## 2025-02-10 RX ADMIN — OSELTAMIVIR PHOSPHATE 75 MG: 75 CAPSULE ORAL at 10:52

## 2025-02-10 RX ADMIN — ENOXAPARIN SODIUM 150 MG: 150 INJECTION SUBCUTANEOUS at 09:16

## 2025-02-10 RX ADMIN — WATER 1000 MG: 1 INJECTION INTRAMUSCULAR; INTRAVENOUS; SUBCUTANEOUS at 15:12

## 2025-02-10 RX ADMIN — PREDNISONE 40 MG: 20 TABLET ORAL at 09:15

## 2025-02-10 RX ADMIN — INSULIN LISPRO 4 UNITS: 100 INJECTION, SOLUTION INTRAVENOUS; SUBCUTANEOUS at 13:04

## 2025-02-10 RX ADMIN — INSULIN LISPRO 4 UNITS: 100 INJECTION, SOLUTION INTRAVENOUS; SUBCUTANEOUS at 16:52

## 2025-02-10 RX ADMIN — IPRATROPIUM BROMIDE AND ALBUTEROL SULFATE 1 DOSE: 2.5; .5 SOLUTION RESPIRATORY (INHALATION) at 04:49

## 2025-02-10 RX ADMIN — ATORVASTATIN CALCIUM 10 MG: 20 TABLET, FILM COATED ORAL at 10:52

## 2025-02-10 RX ADMIN — METHADONE HYDROCHLORIDE 5 MG: 10 TABLET ORAL at 09:14

## 2025-02-10 RX ADMIN — AZITHROMYCIN MONOHYDRATE 500 MG: 500 INJECTION, POWDER, LYOPHILIZED, FOR SOLUTION INTRAVENOUS at 15:20

## 2025-02-10 ASSESSMENT — PAIN DESCRIPTION - ORIENTATION
ORIENTATION: RIGHT
ORIENTATION: RIGHT;LEFT

## 2025-02-10 ASSESSMENT — PAIN DESCRIPTION - LOCATION
LOCATION: BACK
LOCATION: BACK
LOCATION: LEG;BACK;SHOULDER

## 2025-02-10 ASSESSMENT — PAIN SCALES - GENERAL
PAINLEVEL_OUTOF10: 7
PAINLEVEL_OUTOF10: 3

## 2025-02-10 ASSESSMENT — PAIN DESCRIPTION - DESCRIPTORS
DESCRIPTORS: ACHING
DESCRIPTORS: ACHING

## 2025-02-10 ASSESSMENT — PAIN - FUNCTIONAL ASSESSMENT: PAIN_FUNCTIONAL_ASSESSMENT: PREVENTS OR INTERFERES SOME ACTIVE ACTIVITIES AND ADLS

## 2025-02-10 NOTE — PROGRESS NOTES
Hospitalist Progress Note    NAME:   Jessica Mane   : 1970   MRN: [id number]     Date/Time: 2025 12:34 PM  Patient PCP: No primary care provider on file.    Estimated discharge date: 25  Barriers: clinical improvement, weaning oxygen      Assessment / Plan:    Acute on chronic hypoxic respiratory failure -on 5 L O2 at home, POA  Influenza A positive  Underlying COPD    Currently on 6L of supplemental oxyge  Tamiflu  Steroids: dose of decadron given in ED ,   Continue solumedrol 40 mg iv BID  Appreciate pulmonary consult  Scheduled jet nebs  Continue antibiotics ceftriaxone and azithromycin empriically.      Pulmonary edema  Acute on Chronic HF , unable to specify LVEF  CAD/ HTN  Bilateral lower extremity edema      Multiple prior poor TTEs on file with poor visualization   If troponin elevated, will consult cardiology   Diuretics: lasix 40 mg in ED, hold lasix 40 mg IV BID until labs are obtained  Continue aspirin, statin, metoprolol     Subtotal atelectasis of the left lower lobe and lingula  CT chest done and reviewed  On eliquis , last dose  --- on lovenox now  CXR showed large left pleural effusion. CT chest showed trace left pleural effusion  Resume home diuretics      DM type II on insulin  Closely monitor for hyperglycemia/hypoglycemia  Continue home Lantus 45 units daily  Cover with sliding scale as needed     Hypokalemia  Supplement and monitor      History of TERRA not on CPAP, intolerant of Trelegy  Metastatic breast cancer  mainly to bone on home hospice   Chronic Pain syndrome due to Bony mets: on methadone bid and dilaudid prn   Hx of pulmonary embolism: On Eliquis at home- currently on lovenox  morbid obesity. Body mass index is 50.84 kg/m².          Medical Decision Making:   I personally reviewed labs:cbc, BMP   I personally reviewed imaging:  I personally reviewed EKG:  Toxic drug monitoring: monitor electrolytes and creatinine while patient is getting iv 
Comprehensive Nutrition Assessment    Type and Reason for Visit:  Positive nutrition screen, Wound    Nutrition Recommendations/Plan:   Rec liberalize diet to 5CHO/BEE, add double protein  Magic cup 2x/day, aaron 2x/day, yogurt  Encourage PO intakes, honor preferences as able, provide assistance PRN  Please obtain updated measured weight as able  Monitor and record PO intakes, supplement acceptance, and Bms in I/Os     Malnutrition Assessment:  Malnutrition Status:  Insufficient data (02/07/25 1531)    Context:  Chronic Illness     Findings of the 6 clinical characteristics of malnutrition:  Energy Intake:  Unable to assess  Weight Loss:  Unable to assess     Body Fat Loss:  Unable to assess     Muscle Mass Loss:  Unable to assess    Fluid Accumulation:  No fluid accumulation     Strength:  Not Performed    Nutrition Assessment:    Admitted for influenza A. Noted pt was on hospice and plans for hospice on discharge. PMHx includes CAD, CHF, COPD, DM, HTN, NSTEMI. Screened for stage 2 PI. Attempted to call patient's room, no answer. Pt with hx of declining ensure, liked yogurt. Will add yogurt, trial aaron and magic cup to support wound healing. Unable to assess weight hx with only stated weight available.    Nutrition Related Findings:    Labs: Na 140, K 3.4, BUN 9, Creat 0.62, Gluc 159. Meds: atorvastatin, furosemide, insulin lispro, KCl, sennosides-docusate sodium. No edema. BM 2/7. Wound Type: Pressure Injury, Stage II       Current Nutrition Intake & Therapies:    Average Meal Intake: Unable to assess  Average Supplements Intake: None Ordered  ADULT DIET; Regular; 5 carb choices (75 gm/meal); Low Fat/Low Chol/High Fiber/BEE; Low Sodium (2 gm)    Anthropometric Measures:  Height: 167.6 cm (5' 5.98\")  Ideal Body Weight (IBW): 130 lbs (59 kg)    Current Body Weight: 142.9 kg (315 lb 0.6 oz), 242.3 % IBW. Weight Source: Stated  Current BMI (kg/m2): 50.9  Weight Adjustment For: No Adjustment  BMI Categories: Obese 
End of Shift Note    Bedside shift change report given to JULY Corbett (oncoming nurse) by GISSELLE HERNANDEZ RN (offgoing nurse).  Report included the following information SBAR, Kardex, and MAR    Shift worked:  7p-7a     Shift summary and any significant changes:     AAOx4, VSS, pain medication given per MAR, pt refused Q2 turns, educated on risk and benefits, verbalized understanding     Concerns for physician to address:  no     Zone phone for oncoming shift:   none       Activity:  Level of Assistance: Maximum assist, patient does 25-49%    Cardiac:   Cardiac Monitoring:  yes     Access:  Current line(s): PIV     Genitourinary:   Urinary Status: External catheter    Respiratory:   O2 Device: Nasal cannula    GI:  Current diet: ADULT DIET; Regular; 5 carb choices (75 gm/meal); Low Sodium (2 gm); Double Protein Portions; please add yogurt to breakfast and lunch  ADULT ORAL NUTRITION SUPPLEMENT; Lunch, Dinner; Frozen Oral Supplement  ADULT ORAL NUTRITION SUPPLEMENT; Breakfast, Dinner; Wound Healing Oral Supplement    Pain Management:   Patient states pain is manageable on current regimen: YES    Skin:  Gold Scale Score: 15  Interventions: Wound Offloading (Prevention Methods): Bed, pressure reduction mattress, Pillows  Pressure injury: yes     Patient Safety:  Fall Score: Zheng Total Score: 55  Fall Risk Interventions  Nursing Judgement-Fall Risk High(Add Comments): Yes  Toilet Every 2 Hours-In Advance of Need: Yes  Hourly Visual Checks: Awake, In bed  Fall Visual Posted: Armband, Fall sign posted, Socks  Room Door Open: Deferred for droplet isolation  Alarm On: Bed  Patient Moved Closer to Nursing Station: No    Active Consults:  IP CONSULT TO CASE MANAGEMENT  IP CONSULT TO PHARMACY  IP CONSULT TO PULMONOLOGY    Length of Stay:  Expected LOS: 3  Actual LOS: 2      GISSELLE HERNANDEZ RN    
End of Shift Note    Bedside shift change report given to JULY Corbett (oncoming nurse) by GISSELLE HERNANDEZ RN (offgoing nurse).  Report included the following information SBAR, Kardex, and MAR    Shift worked:  7p-7a     Shift summary and any significant changes:     AAOx4, VSS, pain medication given per MAR, pt refused Q2 turns, stated she is comfortable in supine position, educated on risks and benefits, verbalized understanding.      Concerns for physician to address:  no     Zone phone for oncoming shift:   none       Activity:  Level of Assistance: Maximum assist, patient does 25-49%    Cardiac:   Cardiac Monitoring:  yes     Access:  Current line(s): PIV     Genitourinary:   Urinary Status: Voiding, External catheter    Respiratory:   O2 Device: Nasal cannula    GI:  Current diet: ADULT DIET; Regular; 5 carb choices (75 gm/meal); Low Sodium (2 gm); Double Protein Portions; please add yogurt to breakfast and lunch  ADULT ORAL NUTRITION SUPPLEMENT; Lunch, Dinner; Frozen Oral Supplement  ADULT ORAL NUTRITION SUPPLEMENT; Breakfast, Dinner; Wound Healing Oral Supplement    Pain Management:   Patient states pain is manageable on current regimen: YES    Skin:  Gold Scale Score: 15  Interventions: Wound Offloading (Prevention Methods): Bed, pressure reduction mattress, Pillows, Repositioning, Turning  Pressure injury: yes present on admission    Patient Safety:  Fall Score: Zhegn Total Score: 55  Fall Risk Interventions  Nursing Judgement-Fall Risk High(Add Comments): Yes  Toilet Every 2 Hours-In Advance of Need: Yes  Hourly Visual Checks: Awake, In bed  Fall Visual Posted: Armband, Fall sign posted  Room Door Open: Deferred for droplet isolation  Alarm On: Bed  Patient Moved Closer to Nursing Station: No    Active Consults:  IP CONSULT TO CASE MANAGEMENT  IP CONSULT TO PHARMACY    Length of Stay:  Expected LOS: 3  Actual LOS: 1      GISSELLE HERNANDEZ RN    
End of Shift Note    Bedside shift change report given to JULY Coronel (oncoming nurse) by GISSELLE HERNANDEZ RN (offgoing nurse).  Report included the following information SBAR, Kardex, and MAR    Shift worked:  7p-7a     Shift summary and any significant changes:     AAOX4, VSS, pain medication given per MAR, Q2 turn refused by patient, at change of shift patient stated she did not wish to be disturbed, was unable to assess stage 2 pressure ulcer, safety rounding completed.      Concerns for physician to address:  no     Zone phone for oncoming shift:   none       Activity:  Level of Assistance: Maximum assist, patient does 25-49%    Cardiac:   Cardiac Monitoring:  yes    Access:  Current line(s): PIV     Genitourinary:   Urinary Status: External catheter    Respiratory:   O2 Device: Nasal cannula    GI:  Current diet: ADULT DIET; Regular; 5 carb choices (75 gm/meal); Low Sodium (2 gm); Double Protein Portions; please add yogurt to breakfast and lunch  ADULT ORAL NUTRITION SUPPLEMENT; Lunch, Dinner; Frozen Oral Supplement  ADULT ORAL NUTRITION SUPPLEMENT; Breakfast, Dinner; Wound Healing Oral Supplement    Pain Management:   Patient states pain is manageable on current regimen: YES    Skin:  Gold Scale Score: 15  Interventions: Wound Offloading (Prevention Methods): Bed, pressure reduction mattress, Pillows (refused to be turned)  Pressure injury: yes     Patient Safety:  Fall Score: Zheng Total Score: 55  Fall Risk Interventions  Nursing Judgement-Fall Risk High(Add Comments): Yes  Toilet Every 2 Hours-In Advance of Need: Yes  Hourly Visual Checks: Awake, In bed  Fall Visual Posted: Armband, Fall sign posted  Room Door Open: Deferred for droplet isolation  Alarm On: Bed  Patient Moved Closer to Nursing Station: No    Active Consults:  IP CONSULT TO CASE MANAGEMENT  IP CONSULT TO PHARMACY  IP CONSULT TO PULMONOLOGY    Length of Stay:  Expected LOS: 3  Actual LOS: 3      GISSELLE HERNANDEZ RN    
End of Shift Note    Bedside shift change report given to JULY Coy (oncoming nurse) by Jeffrey Dumas RN (offgoing nurse).  Report included the following information SBAR, MAR, and Recent Results    Shift worked:  7a-7p     Shift summary and any significant changes:     Pt tolerated her care fairly well, her bed linens changed, tolerating her O2 (6L/min)     Concerns for physician to address:  none     Zone phone for oncoming shift:   none       Activity:  Level of Assistance: Dependent, patient does less than 25%  Number times ambulated in hallways past shift: 0  Number of times OOB to chair past shift: 0    Cardiac:   Cardiac Monitoring: Yes      Cardiac Rhythm: Sinus rhythm    Access:  Current line(s): PIV     Genitourinary:   Urinary Status: Voiding, External catheter    Respiratory:   O2 Device: Nasal cannula  Chronic home O2 use?: YES  Incentive spirometer at bedside: NO    GI:  Last BM (including prior to admit): 02/07/25  Current diet:  ADULT DIET; Regular; 5 carb choices (75 gm/meal); Low Sodium (2 gm); Double Protein Portions; please add yogurt to breakfast and lunch  ADULT ORAL NUTRITION SUPPLEMENT; Lunch, Dinner; Frozen Oral Supplement  ADULT ORAL NUTRITION SUPPLEMENT; Breakfast, Dinner; Wound Healing Oral Supplement  Passing flatus: NO    Pain Management:   Patient states pain is manageable on current regimen: YES    Skin:  Gold Scale Score: 13  Interventions: Wound Offloading (Prevention Methods): Bed, pressure reduction mattress, Foam silicone, Pillows, Repositioning, Turning    Patient Safety:  Fall Risk: Nursing Judgement-Fall Risk High(Add Comments): Yes  Fall Risk Interventions  Nursing Judgement-Fall Risk High(Add Comments): Yes  Toilet Every 2 Hours-In Advance of Need: Yes  Hourly Visual Checks: Awake, In bed, Quiet  Fall Visual Posted: Armband, Fall sign posted  Room Door Open: Deferred to decrease stimulation  Alarm On: Bed, Chair  Patient Moved Closer to Nursing Station: 
Ms. Mane continues to refuse q2 turns and becomes angry when her bed is lowered completely.  Despite being informed of the risks/benefits of these, she is very resistant.  
Spiritual Health History and Assessment/Progress Note  [facility]    Initial Encounter,  ,  ,      Name: Jessica Mane MRN: [id number]    Age: 54 y.o.     Sex: female   Language: English   Mosque: Sikh   Influenza A (H1N1)     Date: 2/9/2025            Total Time Calculated: 17 min              Spiritual Assessment began in [facility] MULTI-SPECIALTY TELEMETRY        Referral/Consult From: Rounding   Encounter Overview/Reason: Initial Encounter  Service Provided For: Patient    Garima, Belief, Meaning:   Patient identifies as spiritual, is connected with a garima tradition or spiritual practice, and has beliefs or practices that help with coping during difficult times  Family/Friends No family/friends present      Importance and Influence:  Patient has no beliefs influential to healthcare decision-making identified during this visit  Family/Friends No family/friends present    Community:  Patient is connected with a spiritual community and expresses feelings of isolation: Other: stated she has some support, but it seems limited; she has support from her   Family/Friends No family/friends present    Assessment and Plan of Care:     Patient Interventions include: Facilitated expression of thoughts and feelings, Explored spiritual coping/struggle/distress, and Affirmed coping skills/support systems Receptive to assurance of prayer  Family/Friends Interventions include: No family/friends present    Patient Plan of Care: Spiritual Care available upon further referral  Family/Friends Plan of Care: No family/friends present    Electronically signed by Chaplain JAIDA on 2/9/2025 at 4:02 PM    
TRANSFER - IN REPORT:    Verbal report received from JULY Kearney on Jessica Mane  being received from JULY Kearney for routine progression of patient care      Report consisted of patient's Situation, Background, Assessment and   Recommendations(SBAR).     Information from the following report(s) ED SBAR was reviewed with the receiving nurse.    Opportunity for questions and clarification was provided.      Assessment completed upon patient's arrival to unit and care assumed.       Sacrum nonblanchable; tear to L buttocks; BLE edema; dey flaky; vascular discoloration; generalized edema; productive cough    End of Shift Note  Bedside shift report given to   JULY Coy. Report included the following information Nurse Handoff Report, MAR, Telemetry status, Recent Results, and plan of care. Oncoming RN verbalized understanding of plan of care with opportunity for clarification and questions. Dual skin check performed at this time.       Shift worked:  7a - 7p     Shift summary and any significant changes:     Admitted to unit. Maintaining oxygen saturation on 6L NC. Attempted to wean, but patient de-sat to 85%. Family updated on Plan of care.     Concerns for physician to address:     Zone phone for oncoming shift:          Activity:  Level of Assistance: Dependent, patient does less than 25%      Cardiac:   Cardiac Monitoring: Yes      Cardiac Rhythm: Sinus rhythm    Access:  Current line(s): PIV     Genitourinary:   Urinary Status: Voiding, External catheter    Respiratory:   O2 Device: Nasal cannula  Chronic home O2 use?: YES  Incentive spirometer at bedside: N/A    GI:  Last BM (including prior to admit): 02/07/25  Current diet:  ADULT DIET; Regular; 5 carb choices (75 gm/meal); Low Sodium (2 gm); Double Protein Portions; please add yogurt to breakfast and lunch  ADULT ORAL NUTRITION SUPPLEMENT; Lunch, Dinner; Frozen Oral Supplement  ADULT ORAL NUTRITION SUPPLEMENT; Breakfast, Dinner; Wound Healing Oral 
Treatment Enoxaparin dosing consult:    Indication: PE (hx)  Labs reviewed  Last dose: 2/6 pm    Per protocol: enoxaparin 1mg/kg bid Or 150mg at 0900/2100. Entered medication based on indication, renal function, and lab values.     Ryley Arce, [place]  Emergency Department Pharmacist Specialist (x [phone])        
Deferred for droplet isolation  Alarm On: Bed  Patient Moved Closer to Nursing Station: No    Active Consults:   IP CONSULT TO CASE MANAGEMENT  IP CONSULT TO PHARMACY  IP CONSULT TO PULMONOLOGY    Length of Stay:  Expected LOS: 3  Actual LOS: 3    Nithya Major RN                            
drug monitoring: monitor electrolytes and creatinine while patient is getting iv diuretics  Monitor blood sugar on solumedrol  Monitor for insulin toxicity  Monitor for hypglycemia while patient is on insulin.  Type II DM  -continue ISS  -monitor for hypoglycemia due to ISS with serial Accu- Checks  -Hypoglycemia protocol  -diabetic diet    Discussed case with: patient, RN        Code Status: DNR  DVT Prophylaxis: lovenox- therapeutic dose  GI Prophylaxis:    Subjective:     Chief Complaint / Reason for Physician Visit  Patient continues to have shortness of breath.   [facility]       Objective:     VITALS:   Last 24hrs VS reviewed since prior progress note. Most recent are:  Patient Vitals for the past 24 hrs:   BP Temp Temp src Pulse Resp SpO2 Height   02/08/25 0802 132/88 97.9 °F (36.6 °C) Oral 86 20 92 % --   02/08/25 0359 134/82 98.1 °F (36.7 °C) Oral 76 20 92 % --   02/08/25 0211 -- -- -- -- 17 -- --   02/08/25 0141 -- -- -- -- 17 -- --   02/07/25 2202 -- -- -- -- 17 -- --   02/07/25 2132 126/78 -- -- 66 -- -- --   02/07/25 2034 -- -- -- 66 17 90 % --   02/07/25 2015 126/78 98.8 °F (37.1 °C) Oral 97 20 97 % --   02/07/25 1529 -- -- -- -- -- -- 1.676 m (5' 5.98\")   02/07/25 1454 129/88 98.2 °F (36.8 °C) Oral 85 19 90 % --   02/07/25 1300 (!) 135/92 -- -- 97 22 90 % --         Intake/Output Summary (Last 24 hours) at 2/8/2025 1249  Last data filed at 2/8/2025 0359  Gross per 24 hour   Intake 200 ml   Output 400 ml   Net -200 ml        I had a face to face encounter and independently examined this patient on 2/8/2025, as outlined below:  PHYSICAL EXAM:  General: Alert, cooperative, obese  EENT:  EOMI. Anicteric sclerae.  Resp:  B/L decrease air entry  CV:  Regular  rhythm,  +edema  GI:  Soft, Non distended, Non tender.  +Bowel sounds  Neurologic:  Alert and awake  Psych:   Poor insight. Not anxious nor agitated  Skin:  No rashes.  No jaundice    Reviewed most current lab

## 2025-02-10 NOTE — PLAN OF CARE
Problem: Chronic Conditions and Co-morbidities  Goal: Patient's chronic conditions and co-morbidity symptoms are monitored and maintained or improved  Outcome: Progressing  Flowsheets (Taken 2/9/2025 2138)  Care Plan - Patient's Chronic Conditions and Co-Morbidity Symptoms are Monitored and Maintained or Improved: Monitor and assess patient's chronic conditions and comorbid symptoms for stability, deterioration, or improvement     Problem: Discharge Planning  Goal: Discharge to home or other facility with appropriate resources  Outcome: Progressing  Flowsheets (Taken 2/9/2025 2138)  Discharge to home or other facility with appropriate resources: Identify barriers to discharge with patient and caregiver     Problem: Nutrition Deficit:  Goal: Optimize nutritional status  Outcome: Progressing

## 2025-02-10 NOTE — CARE COORDINATION
Pt is clear from CM standpoint for d/c.    [facility] to transport at 5 pm.      Care Management Initial Assessment       RUR: 21%  Readmission? No  1st IM letter given? Yes - 2/725  1st  letter given: No    CM introduce self, explain role and confirmed demographics with pt.    Pt lives with her nephew in an first floor apartment with no steps to enter.    Pt was receiving hospice services from [facility].    CM sent referral to [facility].    Pt also has Medicaid personal caregivers.            02/10/25 1227   Service Assessment   Patient Orientation Alert and Oriented   Cognition Alert   History Provided By Patient   Primary Caregiver Family   Support Systems Family Members;/;Other (Comment)  (Hospice services)   Patient's Healthcare Decision Maker is: Named in Scanned ACP Document   PCP Verified by CM Yes   Last Visit to PCP Within last 3 months   Prior Functional Level Assistance with the following:;Bathing;Dressing;Toileting;Cooking;Housework;Shopping;Mobility   Current Functional Level Assistance with the following:;Bathing;Dressing;Toileting;Feeding;Cooking;Housework;Shopping;Mobility   Can patient return to prior living arrangement Yes   Family able to assist with home care needs: Yes   Would you like for me to discuss the discharge plan with any other family members/significant others, and if so, who? Yes  (Pt's sister in law Flora Madina)   Financial Resources Medicare;Medicaid   Community Resources Hospice   Social/Functional History   Lives With Family   Type of Home Apartment   Home Equipment Oxygen;Hospital bed   Active  No   Patient's  Info Medicaid transportation   Discharge Planning   Type of Residence Apartment   Current Services Prior To Admission Durable Medical Equipment;Hospice Services   Patient expects to be discharged to: Apartment   Services At/After Discharge   Transition of Care Consult (CM Consult) Hospice   Services At/After

## 2025-02-10 NOTE — DISCHARGE SUMMARY
Discharge Summary    Name: Jessica Mane  [id number]  YOB: 1970 (Age: 54 y.o.)   Date of Admission: 2/7/2025  Date of Discharge: 2/10/2025  Attending Physician: Stacy Borges MD    Discharge Diagnosis:   Acute on chronic hypoxic respiratory failure  Influenza A  COPD  B/L lower extremity edema  Acute on chronic diastolic heart failure  Subtotal atelectasiss of the left lower lobe and lingula  Type II DM  Acute hypokalemia  History of TERRA not on CPAP, intolerant of Trelegy  Metastatic breast cancer  mainly to bone on home hospice   Chronic Pain syndrome due to Bony mets: on methadone bid and dilaudid prn   Hx of pulmonary embolism:   Chronic hypercoagulable state due to eliquis  morbid obesity. Body mass index is 50.84 kg/m².    Consultations:  IP CONSULT TO CASE MANAGEMENT  IP CONSULT TO PHARMACY  IP CONSULT TO PULMONOLOGY      Brief Admission History/Reason for Admission Per Yue Helms MD:   Jessica Mane is a 54 y.o.  female with PMHx significant for CAD, asthma, breast cancer, CHF, COPD, DM, HTN, chronic hypoxic respiratory failure on 5 L of O2 at home who presented to ED via EMS with acute on chronic shortness of breath.  Patient reports not feeling well for the last 3 days.  Patient started with acute shortness of breath last night.  Patient administered multiple rounds of breathing treatments without significant improvement.  Shortness of breath was progressively getting worse.  She denies fever or chills.  No chest pain.  EMS found patient with O2 saturation at 89% on 5 L of O2.  Vital signs in ED with BP in 100s, tachycardic in 100s.  No fever.  Currently on chronic 5 L of O2 with O2 saturation 92%     Brief Hospital Course by Main Problems:   Acute on chronic hypoxic respiratory failure -on 5 L O2 at home, POA  Influenza A positive  Underlying COPD     Was on 6L of supplemental oxygen, now titrated down to 5L  Tamiflu- complete course on

## 2025-02-22 ENCOUNTER — HOSPITAL ENCOUNTER (INPATIENT)
Facility: HOSPITAL | Age: 55
LOS: 25 days | Discharge: HOSPICE | DRG: 189 | End: 2025-03-20
Attending: STUDENT IN AN ORGANIZED HEALTH CARE EDUCATION/TRAINING PROGRAM | Admitting: INTERNAL MEDICINE
Payer: MEDICARE

## 2025-02-22 ENCOUNTER — APPOINTMENT (OUTPATIENT)
Facility: HOSPITAL | Age: 55
DRG: 189 | End: 2025-02-22
Payer: MEDICARE

## 2025-02-22 DIAGNOSIS — J96.01 ACUTE HYPOXIC RESPIRATORY FAILURE: ICD-10-CM

## 2025-02-22 DIAGNOSIS — I50.9 CONGESTIVE HEART FAILURE, UNSPECIFIED HF CHRONICITY, UNSPECIFIED HEART FAILURE TYPE (HCC): Primary | ICD-10-CM

## 2025-02-22 DIAGNOSIS — R06.02 SHORTNESS OF BREATH: ICD-10-CM

## 2025-02-22 LAB
ALBUMIN SERPL-MCNC: 3 G/DL (ref 3.5–5)
ALBUMIN/GLOB SERPL: 0.7 (ref 1.1–2.2)
ALP SERPL-CCNC: 290 U/L (ref 45–117)
ALT SERPL-CCNC: 32 U/L (ref 12–78)
ANION GAP BLD CALC-SCNC: ABNORMAL (ref 10–20)
ANION GAP SERPL CALC-SCNC: ABNORMAL MMOL/L (ref 2–12)
ARTERIAL PATENCY WRIST A: POSITIVE
AST SERPL-CCNC: 34 U/L (ref 15–37)
BASE EXCESS BLD CALC-SCNC: 3.3 MMOL/L
BASE EXCESS BLD CALC-SCNC: 6.1 MMOL/L
BASOPHILS # BLD: 0.11 K/UL (ref 0–0.1)
BASOPHILS NFR BLD: 0.7 % (ref 0–1)
BDY SITE: ABNORMAL
BILIRUB SERPL-MCNC: 0.5 MG/DL (ref 0.2–1)
BUN SERPL-MCNC: 18 MG/DL (ref 6–20)
BUN/CREAT SERPL: 28 (ref 12–20)
CA-I BLD-MCNC: 1.48 MMOL/L (ref 1.15–1.33)
CALCIUM SERPL-MCNC: 10.3 MG/DL (ref 8.5–10.1)
CHLORIDE BLD-SCNC: 97 MMOL/L (ref 100–111)
CHLORIDE SERPL-SCNC: 100 MMOL/L (ref 97–108)
CO2 SERPL-SCNC: 39 MMOL/L (ref 21–32)
COMMENT:: NORMAL
CREAT SERPL-MCNC: 0.65 MG/DL (ref 0.55–1.02)
CREAT UR-MCNC: 0.7 MG/DL (ref 0.6–1.3)
DIFFERENTIAL METHOD BLD: ABNORMAL
EOSINOPHIL # BLD: 0.02 K/UL (ref 0–0.4)
EOSINOPHIL NFR BLD: 0.1 % (ref 0–7)
ERYTHROCYTE [DISTWIDTH] IN BLOOD BY AUTOMATED COUNT: 14.2 % (ref 11.5–14.5)
FLUAV RNA SPEC QL NAA+PROBE: NOT DETECTED
FLUBV RNA SPEC QL NAA+PROBE: NOT DETECTED
GAS FLOW.O2 O2 DELIVERY SYS: ABNORMAL
GAS FLOW.O2 SETTING OXYMISER: 20 BPM
GLOBULIN SER CALC-MCNC: 4.4 G/DL (ref 2–4)
GLUCOSE BLD STRIP.AUTO-MCNC: 211 MG/DL (ref 65–117)
GLUCOSE BLD STRIP.AUTO-MCNC: 261 MG/DL (ref 74–99)
GLUCOSE SERPL-MCNC: 217 MG/DL (ref 65–100)
HCO3 BLD-SCNC: 34.3 MMOL/L (ref 21–28)
HCO3 BLD-SCNC: 39 MMOL/L (ref 21–28)
HCT VFR BLD AUTO: 46 % (ref 35–47)
HGB BLD-MCNC: 13.4 G/DL (ref 11.5–16)
IMM GRANULOCYTES # BLD AUTO: 0.23 K/UL (ref 0–0.04)
IMM GRANULOCYTES NFR BLD AUTO: 1.5 % (ref 0–0.5)
INSPIRATION.DURATION SETTING TIME VENT: 0.8 SEC
LACTATE BLD-SCNC: 1.35 MMOL/L (ref 0.4–2)
LACTATE BLD-SCNC: 1.54 MMOL/L (ref 0.4–2)
LYMPHOCYTES # BLD: 0.52 K/UL (ref 0.8–3.5)
LYMPHOCYTES NFR BLD: 3.4 % (ref 12–49)
MCH RBC QN AUTO: 28.8 PG (ref 26–34)
MCHC RBC AUTO-ENTMCNC: 29.1 G/DL (ref 30–36.5)
MCV RBC AUTO: 98.9 FL (ref 80–99)
MONOCYTES # BLD: 1.78 K/UL (ref 0–1)
MONOCYTES NFR BLD: 11.7 % (ref 5–13)
NEUTS SEG # BLD: 12.56 K/UL (ref 1.8–8)
NEUTS SEG NFR BLD: 82.6 % (ref 32–75)
NRBC # BLD: 0.02 K/UL (ref 0–0.01)
NRBC BLD-RTO: 0.1 PER 100 WBC
NT PRO BNP: 865 PG/ML
O2/TOTAL GAS SETTING VFR VENT: 50 %
PCO2 BLD: 95.4 MMHG (ref 35–48)
PCO2 BLD: 99.9 MMHG (ref 35–48)
PCO2 BLDV: >110 MMHG (ref 41–51)
PCO2 BLDV: >110 MMHG (ref 41–51)
PEEP RESPIRATORY: 8 CMH2O
PH BLD: 7.16 (ref 7.35–7.45)
PH BLD: 7.2 (ref 7.35–7.45)
PH BLDV: 7.17 (ref 7.32–7.42)
PH BLDV: 7.18 (ref 7.32–7.42)
PLATELET # BLD AUTO: 199 K/UL (ref 150–400)
PMV BLD AUTO: 10.3 FL (ref 8.9–12.9)
PO2 BLD: 31 MMHG (ref 83–108)
PO2 BLD: 84 MMHG (ref 83–108)
PO2 BLDV: 29 MMHG (ref 25–40)
PO2 BLDV: 56 MMHG (ref 25–40)
POTASSIUM BLD-SCNC: 4.5 MMOL/L (ref 3.5–5.5)
POTASSIUM SERPL-SCNC: 4.8 MMOL/L (ref 3.5–5.1)
PROT SERPL-MCNC: 7.4 G/DL (ref 6.4–8.2)
RBC # BLD AUTO: 4.65 M/UL (ref 3.8–5.2)
RBC MORPH BLD: ABNORMAL
RBC MORPH BLD: ABNORMAL
SAO2 % BLD: 39.7 % (ref 92–97)
SAO2 % BLD: 92.2 % (ref 92–97)
SARS-COV-2 RNA RESP QL NAA+PROBE: NOT DETECTED
SERVICE CMNT-IMP: ABNORMAL
SODIUM BLD-SCNC: 146 MMOL/L (ref 136–145)
SODIUM SERPL-SCNC: 138 MMOL/L (ref 136–145)
SOURCE: NORMAL
SPECIMEN HOLD: NORMAL
SPECIMEN SITE: ABNORMAL
SPECIMEN TYPE: ABNORMAL
TROPONIN I SERPL HS-MCNC: 50 NG/L (ref 0–51)
VENTILATION MODE VENT: ABNORMAL
WBC # BLD AUTO: 15.2 K/UL (ref 3.6–11)

## 2025-02-22 PROCEDURE — 6370000000 HC RX 637 (ALT 250 FOR IP)

## 2025-02-22 PROCEDURE — 94640 AIRWAY INHALATION TREATMENT: CPT

## 2025-02-22 PROCEDURE — 85025 COMPLETE CBC W/AUTO DIFF WBC: CPT

## 2025-02-22 PROCEDURE — 71045 X-RAY EXAM CHEST 1 VIEW: CPT

## 2025-02-22 PROCEDURE — 6360000002 HC RX W HCPCS

## 2025-02-22 PROCEDURE — 82947 ASSAY GLUCOSE BLOOD QUANT: CPT

## 2025-02-22 PROCEDURE — 2700000000 HC OXYGEN THERAPY PER DAY

## 2025-02-22 PROCEDURE — 84295 ASSAY OF SERUM SODIUM: CPT

## 2025-02-22 PROCEDURE — 80053 COMPREHEN METABOLIC PANEL: CPT

## 2025-02-22 PROCEDURE — 6370000000 HC RX 637 (ALT 250 FOR IP): Performed by: STUDENT IN AN ORGANIZED HEALTH CARE EDUCATION/TRAINING PROGRAM

## 2025-02-22 PROCEDURE — 87636 SARSCOV2 & INF A&B AMP PRB: CPT

## 2025-02-22 PROCEDURE — 94660 CPAP INITIATION&MGMT: CPT

## 2025-02-22 PROCEDURE — 84484 ASSAY OF TROPONIN QUANT: CPT

## 2025-02-22 PROCEDURE — 36600 WITHDRAWAL OF ARTERIAL BLOOD: CPT

## 2025-02-22 PROCEDURE — 96365 THER/PROPH/DIAG IV INF INIT: CPT

## 2025-02-22 PROCEDURE — 99285 EMERGENCY DEPT VISIT HI MDM: CPT

## 2025-02-22 PROCEDURE — 36415 COLL VENOUS BLD VENIPUNCTURE: CPT

## 2025-02-22 PROCEDURE — 93005 ELECTROCARDIOGRAM TRACING: CPT | Performed by: STUDENT IN AN ORGANIZED HEALTH CARE EDUCATION/TRAINING PROGRAM

## 2025-02-22 PROCEDURE — 82962 GLUCOSE BLOOD TEST: CPT

## 2025-02-22 PROCEDURE — 2500000003 HC RX 250 WO HCPCS

## 2025-02-22 PROCEDURE — 5A09357 ASSISTANCE WITH RESPIRATORY VENTILATION, LESS THAN 24 CONSECUTIVE HOURS, CONTINUOUS POSITIVE AIRWAY PRESSURE: ICD-10-PCS | Performed by: STUDENT IN AN ORGANIZED HEALTH CARE EDUCATION/TRAINING PROGRAM

## 2025-02-22 PROCEDURE — 2580000003 HC RX 258

## 2025-02-22 PROCEDURE — 83880 ASSAY OF NATRIURETIC PEPTIDE: CPT

## 2025-02-22 PROCEDURE — 84132 ASSAY OF SERUM POTASSIUM: CPT

## 2025-02-22 PROCEDURE — 96375 TX/PRO/DX INJ NEW DRUG ADDON: CPT

## 2025-02-22 PROCEDURE — 83605 ASSAY OF LACTIC ACID: CPT

## 2025-02-22 PROCEDURE — 82330 ASSAY OF CALCIUM: CPT

## 2025-02-22 PROCEDURE — 82803 BLOOD GASES ANY COMBINATION: CPT

## 2025-02-22 RX ORDER — IPRATROPIUM BROMIDE AND ALBUTEROL SULFATE 2.5; .5 MG/3ML; MG/3ML
1 SOLUTION RESPIRATORY (INHALATION) ONCE
Status: COMPLETED | OUTPATIENT
Start: 2025-02-22 | End: 2025-02-22

## 2025-02-22 RX ORDER — IPRATROPIUM BROMIDE AND ALBUTEROL SULFATE 2.5; .5 MG/3ML; MG/3ML
1 SOLUTION RESPIRATORY (INHALATION)
Status: COMPLETED | OUTPATIENT
Start: 2025-02-22 | End: 2025-02-22

## 2025-02-22 RX ADMIN — WATER 1000 MG: 1 INJECTION INTRAMUSCULAR; INTRAVENOUS; SUBCUTANEOUS at 23:17

## 2025-02-22 RX ADMIN — AZITHROMYCIN MONOHYDRATE 500 MG: 500 INJECTION, POWDER, LYOPHILIZED, FOR SOLUTION INTRAVENOUS at 23:24

## 2025-02-22 RX ADMIN — IPRATROPIUM BROMIDE AND ALBUTEROL SULFATE 1 DOSE: .5; 3 SOLUTION RESPIRATORY (INHALATION) at 23:14

## 2025-02-22 RX ADMIN — IPRATROPIUM BROMIDE AND ALBUTEROL SULFATE 1 DOSE: .5; 3 SOLUTION RESPIRATORY (INHALATION) at 21:51

## 2025-02-23 ENCOUNTER — APPOINTMENT (OUTPATIENT)
Facility: HOSPITAL | Age: 55
DRG: 189 | End: 2025-02-23
Payer: MEDICARE

## 2025-02-23 PROBLEM — J96.92 HYPERCAPNIC RESPIRATORY FAILURE: Status: ACTIVE | Noted: 2025-02-23

## 2025-02-23 LAB
ANION GAP BLD CALC-SCNC: ABNORMAL (ref 10–20)
ANION GAP SERPL CALC-SCNC: 4 MMOL/L (ref 2–12)
ARTERIAL PATENCY WRIST A: YES
B PERT DNA SPEC QL NAA+PROBE: NOT DETECTED
BASE EXCESS BLDA CALC-SCNC: 9.2 MMOL/L
BDY SITE: ABNORMAL
BORDETELLA PARAPERTUSSIS BY PCR: NOT DETECTED
BUN SERPL-MCNC: 18 MG/DL (ref 6–20)
BUN/CREAT SERPL: 38 (ref 12–20)
C PNEUM DNA SPEC QL NAA+PROBE: NOT DETECTED
CA-I BLD-MCNC: 1.1 MMOL/L (ref 1.15–1.33)
CALCIUM SERPL-MCNC: 9.8 MG/DL (ref 8.5–10.1)
CHLORIDE BLD-SCNC: 103 MMOL/L (ref 100–111)
CHLORIDE SERPL-SCNC: 99 MMOL/L (ref 97–108)
CO2 SERPL-SCNC: 38 MMOL/L (ref 21–32)
CREAT SERPL-MCNC: 0.47 MG/DL (ref 0.55–1.02)
CREAT UR-MCNC: 0.5 MG/DL (ref 0.6–1.3)
EKG ATRIAL RATE: 91 BPM
EKG DIAGNOSIS: NORMAL
EKG P AXIS: 84 DEGREES
EKG P-R INTERVAL: 164 MS
EKG Q-T INTERVAL: 356 MS
EKG QRS DURATION: 86 MS
EKG QTC CALCULATION (BAZETT): 437 MS
EKG R AXIS: 97 DEGREES
EKG T AXIS: 44 DEGREES
EKG VENTRICULAR RATE: 91 BPM
ERYTHROCYTE [DISTWIDTH] IN BLOOD BY AUTOMATED COUNT: 13.9 % (ref 11.5–14.5)
FLUAV SUBTYP SPEC NAA+PROBE: NOT DETECTED
FLUBV RNA SPEC QL NAA+PROBE: NOT DETECTED
GLUCOSE BLD STRIP.AUTO-MCNC: 105 MG/DL (ref 65–117)
GLUCOSE BLD STRIP.AUTO-MCNC: 130 MG/DL (ref 65–117)
GLUCOSE BLD STRIP.AUTO-MCNC: 180 MG/DL (ref 74–99)
GLUCOSE BLD STRIP.AUTO-MCNC: 221 MG/DL (ref 65–117)
GLUCOSE BLD STRIP.AUTO-MCNC: 232 MG/DL (ref 65–117)
GLUCOSE SERPL-MCNC: 147 MG/DL (ref 65–100)
HADV DNA SPEC QL NAA+PROBE: NOT DETECTED
HCO3 BLDA-SCNC: 39 MMOL/L (ref 22–26)
HCOV 229E RNA SPEC QL NAA+PROBE: NOT DETECTED
HCOV HKU1 RNA SPEC QL NAA+PROBE: NOT DETECTED
HCOV NL63 RNA SPEC QL NAA+PROBE: NOT DETECTED
HCOV OC43 RNA SPEC QL NAA+PROBE: NOT DETECTED
HCT VFR BLD AUTO: 45.5 % (ref 35–47)
HGB BLD-MCNC: 13.1 G/DL (ref 11.5–16)
HMPV RNA SPEC QL NAA+PROBE: NOT DETECTED
HPIV1 RNA SPEC QL NAA+PROBE: NOT DETECTED
HPIV2 RNA SPEC QL NAA+PROBE: NOT DETECTED
HPIV3 RNA SPEC QL NAA+PROBE: NOT DETECTED
HPIV4 RNA SPEC QL NAA+PROBE: NOT DETECTED
LACTATE BLD-SCNC: 1.63 MMOL/L (ref 0.4–2)
LACTATE SERPL-SCNC: 1.3 MMOL/L (ref 0.4–2)
M PNEUMO DNA SPEC QL NAA+PROBE: NOT DETECTED
MAGNESIUM SERPL-MCNC: 1.8 MG/DL (ref 1.6–2.4)
MCH RBC QN AUTO: 28.6 PG (ref 26–34)
MCHC RBC AUTO-ENTMCNC: 28.8 G/DL (ref 30–36.5)
MCV RBC AUTO: 99.3 FL (ref 80–99)
NRBC # BLD: 0.02 K/UL (ref 0–0.01)
NRBC BLD-RTO: 0.2 PER 100 WBC
PCO2 BLDA: 77 MMHG (ref 35–45)
PCO2 BLDV: >110 MMHG (ref 41–51)
PH BLDA: 7.32 (ref 7.35–7.45)
PH BLDV: 7.17 (ref 7.32–7.42)
PHOSPHATE SERPL-MCNC: 3.6 MG/DL (ref 2.6–4.7)
PLATELET # BLD AUTO: 157 K/UL (ref 150–400)
PMV BLD AUTO: 9.7 FL (ref 8.9–12.9)
PO2 BLDA: 86 MMHG (ref 80–100)
PO2 BLDV: 30 MMHG (ref 25–40)
POTASSIUM BLD-SCNC: 3.7 MMOL/L (ref 3.5–5.5)
POTASSIUM SERPL-SCNC: 4.3 MMOL/L (ref 3.5–5.1)
PROCALCITONIN SERPL-MCNC: 0.3 NG/ML
RBC # BLD AUTO: 4.58 M/UL (ref 3.8–5.2)
RSV RNA SPEC QL NAA+PROBE: DETECTED
RV+EV RNA SPEC QL NAA+PROBE: NOT DETECTED
SAO2 % BLD: 95 % (ref 92–97)
SAO2% DEVICE SAO2% SENSOR NAME: ABNORMAL
SARS-COV-2 RNA RESP QL NAA+PROBE: NOT DETECTED
SERVICE CMNT-IMP: ABNORMAL
SERVICE CMNT-IMP: NORMAL
SODIUM BLD-SCNC: 151 MMOL/L (ref 136–145)
SODIUM SERPL-SCNC: 141 MMOL/L (ref 136–145)
SPECIMEN SITE: ABNORMAL
SPECIMEN SITE: ABNORMAL
WBC # BLD AUTO: 10.3 K/UL (ref 3.6–11)

## 2025-02-23 PROCEDURE — 36415 COLL VENOUS BLD VENIPUNCTURE: CPT

## 2025-02-23 PROCEDURE — 84100 ASSAY OF PHOSPHORUS: CPT

## 2025-02-23 PROCEDURE — 80048 BASIC METABOLIC PNL TOTAL CA: CPT

## 2025-02-23 PROCEDURE — 2700000000 HC OXYGEN THERAPY PER DAY

## 2025-02-23 PROCEDURE — 6360000002 HC RX W HCPCS: Performed by: NURSE PRACTITIONER

## 2025-02-23 PROCEDURE — 82962 GLUCOSE BLOOD TEST: CPT

## 2025-02-23 PROCEDURE — 2500000003 HC RX 250 WO HCPCS: Performed by: NURSE PRACTITIONER

## 2025-02-23 PROCEDURE — 6370000000 HC RX 637 (ALT 250 FOR IP): Performed by: NURSE PRACTITIONER

## 2025-02-23 PROCEDURE — 2580000003 HC RX 258: Performed by: NURSE PRACTITIONER

## 2025-02-23 PROCEDURE — 84145 PROCALCITONIN (PCT): CPT

## 2025-02-23 PROCEDURE — 94660 CPAP INITIATION&MGMT: CPT

## 2025-02-23 PROCEDURE — 94640 AIRWAY INHALATION TREATMENT: CPT

## 2025-02-23 PROCEDURE — 2000000000 HC ICU R&B

## 2025-02-23 PROCEDURE — 6360000002 HC RX W HCPCS: Performed by: INTERNAL MEDICINE

## 2025-02-23 PROCEDURE — 83605 ASSAY OF LACTIC ACID: CPT

## 2025-02-23 PROCEDURE — 84295 ASSAY OF SERUM SODIUM: CPT

## 2025-02-23 PROCEDURE — 85027 COMPLETE CBC AUTOMATED: CPT

## 2025-02-23 PROCEDURE — 82947 ASSAY GLUCOSE BLOOD QUANT: CPT

## 2025-02-23 PROCEDURE — 6370000000 HC RX 637 (ALT 250 FOR IP): Performed by: INTERNAL MEDICINE

## 2025-02-23 PROCEDURE — 2580000003 HC RX 258: Performed by: INTERNAL MEDICINE

## 2025-02-23 PROCEDURE — 82330 ASSAY OF CALCIUM: CPT

## 2025-02-23 PROCEDURE — 70450 CT HEAD/BRAIN W/O DYE: CPT

## 2025-02-23 PROCEDURE — 36600 WITHDRAWAL OF ARTERIAL BLOOD: CPT

## 2025-02-23 PROCEDURE — 0202U NFCT DS 22 TRGT SARS-COV-2: CPT

## 2025-02-23 PROCEDURE — 84132 ASSAY OF SERUM POTASSIUM: CPT

## 2025-02-23 PROCEDURE — 83735 ASSAY OF MAGNESIUM: CPT

## 2025-02-23 PROCEDURE — 82803 BLOOD GASES ANY COMBINATION: CPT

## 2025-02-23 RX ORDER — METHADONE HYDROCHLORIDE 10 MG/1
5 TABLET ORAL EVERY 12 HOURS
Status: DISCONTINUED | OUTPATIENT
Start: 2025-02-23 | End: 2025-03-20 | Stop reason: HOSPADM

## 2025-02-23 RX ORDER — SODIUM CHLORIDE 9 MG/ML
INJECTION, SOLUTION INTRAVENOUS PRN
Status: DISCONTINUED | OUTPATIENT
Start: 2025-02-23 | End: 2025-03-20 | Stop reason: HOSPADM

## 2025-02-23 RX ORDER — ACETAMINOPHEN 325 MG/1
650 TABLET ORAL EVERY 6 HOURS PRN
Status: DISCONTINUED | OUTPATIENT
Start: 2025-02-23 | End: 2025-03-20 | Stop reason: HOSPADM

## 2025-02-23 RX ORDER — POLYETHYLENE GLYCOL 3350 17 G/17G
17 POWDER, FOR SOLUTION ORAL DAILY PRN
Status: DISCONTINUED | OUTPATIENT
Start: 2025-02-23 | End: 2025-03-20 | Stop reason: HOSPADM

## 2025-02-23 RX ORDER — ONDANSETRON 4 MG/1
4 TABLET, ORALLY DISINTEGRATING ORAL EVERY 8 HOURS PRN
Status: DISCONTINUED | OUTPATIENT
Start: 2025-02-23 | End: 2025-03-20 | Stop reason: HOSPADM

## 2025-02-23 RX ORDER — POTASSIUM CHLORIDE 29.8 MG/ML
20 INJECTION INTRAVENOUS PRN
Status: DISCONTINUED | OUTPATIENT
Start: 2025-02-23 | End: 2025-02-26

## 2025-02-23 RX ORDER — IPRATROPIUM BROMIDE AND ALBUTEROL SULFATE 2.5; .5 MG/3ML; MG/3ML
1 SOLUTION RESPIRATORY (INHALATION) EVERY 6 HOURS
Status: DISCONTINUED | OUTPATIENT
Start: 2025-02-23 | End: 2025-02-26

## 2025-02-23 RX ORDER — ONDANSETRON 2 MG/ML
4 INJECTION INTRAMUSCULAR; INTRAVENOUS EVERY 6 HOURS PRN
Status: DISCONTINUED | OUTPATIENT
Start: 2025-02-23 | End: 2025-03-20 | Stop reason: HOSPADM

## 2025-02-23 RX ORDER — MAGNESIUM SULFATE IN WATER 40 MG/ML
2000 INJECTION, SOLUTION INTRAVENOUS PRN
Status: DISCONTINUED | OUTPATIENT
Start: 2025-02-23 | End: 2025-02-26

## 2025-02-23 RX ORDER — SODIUM CHLORIDE 0.9 % (FLUSH) 0.9 %
5-40 SYRINGE (ML) INJECTION PRN
Status: DISCONTINUED | OUTPATIENT
Start: 2025-02-23 | End: 2025-03-20 | Stop reason: HOSPADM

## 2025-02-23 RX ORDER — INSULIN LISPRO 100 [IU]/ML
0-4 INJECTION, SOLUTION INTRAVENOUS; SUBCUTANEOUS
Status: DISCONTINUED | OUTPATIENT
Start: 2025-02-23 | End: 2025-03-11

## 2025-02-23 RX ORDER — VANCOMYCIN 1.75 G/350ML
1250 INJECTION, SOLUTION INTRAVENOUS EVERY 8 HOURS
Status: DISCONTINUED | OUTPATIENT
Start: 2025-02-23 | End: 2025-02-25

## 2025-02-23 RX ORDER — ACETAMINOPHEN 650 MG/1
650 SUPPOSITORY RECTAL EVERY 6 HOURS PRN
Status: DISCONTINUED | OUTPATIENT
Start: 2025-02-23 | End: 2025-03-20 | Stop reason: HOSPADM

## 2025-02-23 RX ORDER — FUROSEMIDE 10 MG/ML
80 INJECTION INTRAMUSCULAR; INTRAVENOUS ONCE
Status: COMPLETED | OUTPATIENT
Start: 2025-02-23 | End: 2025-02-23

## 2025-02-23 RX ORDER — POTASSIUM CHLORIDE 7.45 MG/ML
10 INJECTION INTRAVENOUS PRN
Status: DISCONTINUED | OUTPATIENT
Start: 2025-02-23 | End: 2025-02-26

## 2025-02-23 RX ORDER — PANTOPRAZOLE SODIUM 40 MG/1
40 TABLET, DELAYED RELEASE ORAL
Status: DISCONTINUED | OUTPATIENT
Start: 2025-02-23 | End: 2025-03-20 | Stop reason: HOSPADM

## 2025-02-23 RX ORDER — CASTOR OIL AND BALSAM, PERU 788; 87 MG/G; MG/G
OINTMENT TOPICAL 2 TIMES DAILY
Status: DISCONTINUED | OUTPATIENT
Start: 2025-02-23 | End: 2025-03-20

## 2025-02-23 RX ORDER — ENOXAPARIN SODIUM 100 MG/ML
40 INJECTION SUBCUTANEOUS DAILY
Status: DISCONTINUED | OUTPATIENT
Start: 2025-02-23 | End: 2025-02-23

## 2025-02-23 RX ORDER — SODIUM CHLORIDE 0.9 % (FLUSH) 0.9 %
5-40 SYRINGE (ML) INJECTION EVERY 12 HOURS SCHEDULED
Status: DISCONTINUED | OUTPATIENT
Start: 2025-02-23 | End: 2025-03-20 | Stop reason: HOSPADM

## 2025-02-23 RX ORDER — INSULIN LISPRO 100 [IU]/ML
0-4 INJECTION, SOLUTION INTRAVENOUS; SUBCUTANEOUS EVERY 6 HOURS
Status: DISCONTINUED | OUTPATIENT
Start: 2025-02-23 | End: 2025-02-23

## 2025-02-23 RX ADMIN — VANCOMYCIN 1250 MG: 1.75 INJECTION, SOLUTION INTRAVENOUS at 22:14

## 2025-02-23 RX ADMIN — INSULIN LISPRO 1 UNITS: 100 INJECTION, SOLUTION INTRAVENOUS; SUBCUTANEOUS at 20:26

## 2025-02-23 RX ADMIN — VANCOMYCIN 1250 MG: 1.75 INJECTION, SOLUTION INTRAVENOUS at 13:34

## 2025-02-23 RX ADMIN — FUROSEMIDE 80 MG: 10 INJECTION, SOLUTION INTRAMUSCULAR; INTRAVENOUS at 02:06

## 2025-02-23 RX ADMIN — APIXABAN 5 MG: 5 TABLET, FILM COATED ORAL at 19:43

## 2025-02-23 RX ADMIN — IPRATROPIUM BROMIDE AND ALBUTEROL SULFATE 1 DOSE: 2.5; .5 SOLUTION RESPIRATORY (INHALATION) at 01:32

## 2025-02-23 RX ADMIN — Medication: at 20:20

## 2025-02-23 RX ADMIN — PIPERACILLIN AND TAZOBACTAM 4500 MG: 4; .5 INJECTION, POWDER, LYOPHILIZED, FOR SOLUTION INTRAVENOUS at 16:20

## 2025-02-23 RX ADMIN — SODIUM CHLORIDE, PRESERVATIVE FREE 10 ML: 5 INJECTION INTRAVENOUS at 08:38

## 2025-02-23 RX ADMIN — Medication 1 AMPULE: at 08:28

## 2025-02-23 RX ADMIN — WATER 40 MG: 1 INJECTION INTRAMUSCULAR; INTRAVENOUS; SUBCUTANEOUS at 08:27

## 2025-02-23 RX ADMIN — Medication 1 AMPULE: at 19:43

## 2025-02-23 RX ADMIN — IPRATROPIUM BROMIDE AND ALBUTEROL SULFATE 1 DOSE: 2.5; .5 SOLUTION RESPIRATORY (INHALATION) at 13:53

## 2025-02-23 RX ADMIN — IPRATROPIUM BROMIDE AND ALBUTEROL SULFATE 1 DOSE: 2.5; .5 SOLUTION RESPIRATORY (INHALATION) at 19:25

## 2025-02-23 RX ADMIN — SODIUM CHLORIDE, PRESERVATIVE FREE 10 ML: 5 INJECTION INTRAVENOUS at 19:48

## 2025-02-23 RX ADMIN — Medication 2500 MG: at 05:44

## 2025-02-23 RX ADMIN — APIXABAN 5 MG: 5 TABLET, FILM COATED ORAL at 11:20

## 2025-02-23 RX ADMIN — PANTOPRAZOLE SODIUM 40 MG: 40 TABLET, DELAYED RELEASE ORAL at 11:20

## 2025-02-23 RX ADMIN — PIPERACILLIN AND TAZOBACTAM 4500 MG: 4; .5 INJECTION, POWDER, LYOPHILIZED, FOR SOLUTION INTRAVENOUS at 02:11

## 2025-02-23 RX ADMIN — METHADONE HYDROCHLORIDE 5 MG: 10 TABLET ORAL at 20:21

## 2025-02-23 RX ADMIN — INSULIN LISPRO 1 UNITS: 100 INJECTION, SOLUTION INTRAVENOUS; SUBCUTANEOUS at 17:28

## 2025-02-23 RX ADMIN — PIPERACILLIN AND TAZOBACTAM 4500 MG: 4; .5 INJECTION, POWDER, LYOPHILIZED, FOR SOLUTION INTRAVENOUS at 08:37

## 2025-02-23 ASSESSMENT — PAIN SCALES - GENERAL
PAINLEVEL_OUTOF10: 0

## 2025-02-23 NOTE — ED NOTES
symptoms 02/22/25 2256   Infiltration Assessment 0 02/22/25 2256   Dressing Status Clean, dry & intact 02/22/25 2256   Dressing Type Transparent 02/22/25 2256   Dressing Intervention New 02/22/25 2256     Active Central Lines:                          Active Wounds:    Active Hoover's:    Active Feeding Tubes:          Recommendation  Incomplete STAT orders: n/a  Overdue Medications: n/a  Patient Belongings:    Additional Comments: n/a  If any further questions, please call Sending RN at [phone]      Admitting Unit Notification  Name of person notified and time: JULY Atkinson      Electronically signed by: Electronically signed by Janel Perry RN on 2/23/2025 at 1:53 AM      Opportunity for questions and clarification was provided.      Patient transported with: monitor  Monitor and Registered Nurse, respiratory

## 2025-02-23 NOTE — H&P
25   Phlebitis Assessment No symptoms 25   Infiltration Assessment 0 25   Alcohol Cap Used Yes 25   Dressing Status Clean, dry & intact 25   Dressing Type Transparent 25       Peripheral IV 25 Left Forearm (Active)   Site Assessment Clean, dry & intact 25   Line Status Blood return noted;Normal saline locked 25   Line Care Connections checked and tightened 25   Phlebitis Assessment No symptoms 25   Infiltration Assessment 0 25   Dressing Status Clean, dry & intact 25   Dressing Type Transparent 25   Dressing Intervention New 25        HOSPITAL COURSE/DAILY EVENT LOG     As above    SUBJECTIVE   Review of Systems     As HPI     OBJECTIVE   Physical Exam  Vitals and nursing note reviewed.   Constitutional:       Appearance: She is ill-appearing.   HENT:      Nose: Nose normal.      Mouth/Throat:      Mouth: Mucous membranes are dry.   Eyes:      Pupils: Pupils are equal, round, and reactive to light.   Cardiovascular:      Rate and Rhythm: Normal rate and regular rhythm.      Pulses: Normal pulses.   Pulmonary:      Effort: Pulmonary effort is normal.      Breath sounds: Normal breath sounds.   Abdominal:      General: Abdomen is flat.   Musculoskeletal:         General: Normal range of motion.   Skin:     General: Skin is warm and dry.      Capillary Refill: Capillary refill takes less than 2 seconds.   Neurological:      General: No focal deficit present.      Mental Status: She is alert and oriented to person, place, and time.      Comments: Lethargic, but wakes and is oriented x3   Psychiatric:         Mood and Affect: Mood normal.         Labs and Data: Reviewed 25  Medications: Reviewed 25  Imaging: Reviewed 25    /72   Pulse 88   Temp 97.9 °F (36.6 °C) (Oral)   Resp 19   SpO2 99%      Temp (24hrs), Av.9 °F (36.6 °C),

## 2025-02-23 NOTE — ED PROVIDER NOTES
[facility] EMERGENCY DEPARTMENT  EMERGENCY DEPARTMENT ENCOUNTER       Pt Name: Jessica Mane  MRN: [id number]  Birthdate 1970  Date of evaluation: 2/22/2025  Provider: Mimi Davis MD   PCP: No primary care provider on file.  Note Started: 10:45 PM EST 2/22/25  Attending Physician: Dr. Carolyn Turk     CHIEF COMPLAINT       Chief Complaint   Patient presents with    Shortness of Breath     BIBEMS from home for SOB, weakness and fatigue. Pt was in and out of conscious en route, O2 sat mid 80's. EMS gave duo neb en route. Pt sts she is having chest pain and right sided pain.         HISTORY OF PRESENT ILLNESS: 1 or more elements      History From: Patient and EMS, History limited by: none     Jessica Mane is a 54 y.o. female with past medical history ofCAD, asthma, breast cancer, CHF, COPD, DM, HTN, chronic hypoxic respiratory failure on 5 L of O2 at home presented to the Emergency Department with shortness of breath, weakness, fatigue, hypoxia.  Patient saturating mid 80s on her home 5 L of the cannula with EMS.  Patient was given DuoNeb.  Patient was valuation complaining of chest pain and right-sided pain.  Patient denies any recent fever or chills.  Patient was discharged on 2/07/25 after stay for acute hypoxic respiratory failure, influenza A and was treated for pneumonia at that time.  Patient was discharged on Augmentin.       Please See MDM for Additional Details of the HPI/PMH  Nursing Notes were all reviewed and agreed with or any disagreements were addressed in the HPI.     REVIEW OF SYSTEMS        Positives and Pertinent negatives as per HPI.    PAST HISTORY     Past Medical History:  Past Medical History:   Diagnosis Date    Arthritis     Asthma     Breast lump     CAD (coronary artery disease)     Cancer (HCC)     breast cancer    Congestive heart failure (HCC)     COPD (chronic obstructive pulmonary disease) (HCC)     Diabetes (HCC)     Hypertension     Morbid obesity with BMI

## 2025-02-24 PROBLEM — C79.51 CARCINOMA OF BREAST METASTATIC TO BONE: Status: ACTIVE | Noted: 2025-02-24

## 2025-02-24 PROBLEM — C79.51 PAIN DUE TO MALIGNANT NEOPLASM METASTATIC TO BONE (HCC): Status: ACTIVE | Noted: 2025-02-24

## 2025-02-24 PROBLEM — Z51.5 PALLIATIVE CARE BY SPECIALIST: Status: ACTIVE | Noted: 2025-02-24

## 2025-02-24 PROBLEM — G89.3 PAIN DUE TO MALIGNANT NEOPLASM METASTATIC TO BONE (HCC): Status: ACTIVE | Noted: 2025-02-24

## 2025-02-24 PROBLEM — C50.919 CARCINOMA OF BREAST METASTATIC TO BONE: Status: ACTIVE | Noted: 2025-02-24

## 2025-02-24 LAB
ANION GAP SERPL CALC-SCNC: 5 MMOL/L (ref 2–12)
BACTERIA SPEC CULT: NORMAL
BACTERIA SPEC CULT: NORMAL
BUN SERPL-MCNC: 21 MG/DL (ref 6–20)
BUN/CREAT SERPL: 27 (ref 12–20)
CALCIUM SERPL-MCNC: 10.4 MG/DL (ref 8.5–10.1)
CHLORIDE SERPL-SCNC: 96 MMOL/L (ref 97–108)
CO2 SERPL-SCNC: 37 MMOL/L (ref 21–32)
CREAT SERPL-MCNC: 0.77 MG/DL (ref 0.55–1.02)
ERYTHROCYTE [DISTWIDTH] IN BLOOD BY AUTOMATED COUNT: 13.8 % (ref 11.5–14.5)
GLUCOSE BLD STRIP.AUTO-MCNC: 113 MG/DL (ref 65–117)
GLUCOSE BLD STRIP.AUTO-MCNC: 210 MG/DL (ref 65–117)
GLUCOSE BLD STRIP.AUTO-MCNC: 228 MG/DL (ref 65–117)
GLUCOSE BLD STRIP.AUTO-MCNC: 228 MG/DL (ref 65–117)
GLUCOSE SERPL-MCNC: 114 MG/DL (ref 65–100)
HCT VFR BLD AUTO: 44.3 % (ref 35–47)
HGB BLD-MCNC: 12.8 G/DL (ref 11.5–16)
MAGNESIUM SERPL-MCNC: 1.8 MG/DL (ref 1.6–2.4)
MCH RBC QN AUTO: 28.6 PG (ref 26–34)
MCHC RBC AUTO-ENTMCNC: 28.9 G/DL (ref 30–36.5)
MCV RBC AUTO: 99.1 FL (ref 80–99)
NRBC # BLD: 0 K/UL (ref 0–0.01)
NRBC BLD-RTO: 0 PER 100 WBC
PHOSPHATE SERPL-MCNC: 2.6 MG/DL (ref 2.6–4.7)
PLATELET # BLD AUTO: 132 K/UL (ref 150–400)
PMV BLD AUTO: 10.6 FL (ref 8.9–12.9)
POTASSIUM SERPL-SCNC: 4.5 MMOL/L (ref 3.5–5.1)
RBC # BLD AUTO: 4.47 M/UL (ref 3.8–5.2)
SERVICE CMNT-IMP: ABNORMAL
SERVICE CMNT-IMP: NORMAL
SERVICE CMNT-IMP: NORMAL
SODIUM SERPL-SCNC: 138 MMOL/L (ref 136–145)
VANCOMYCIN SERPL-MCNC: 34.5 UG/ML
WBC # BLD AUTO: 11.8 K/UL (ref 3.6–11)

## 2025-02-24 PROCEDURE — 80202 ASSAY OF VANCOMYCIN: CPT

## 2025-02-24 PROCEDURE — 6370000000 HC RX 637 (ALT 250 FOR IP): Performed by: NURSE PRACTITIONER

## 2025-02-24 PROCEDURE — 99221 1ST HOSP IP/OBS SF/LOW 40: CPT | Performed by: NURSE PRACTITIONER

## 2025-02-24 PROCEDURE — 94640 AIRWAY INHALATION TREATMENT: CPT

## 2025-02-24 PROCEDURE — 2700000000 HC OXYGEN THERAPY PER DAY

## 2025-02-24 PROCEDURE — 82962 GLUCOSE BLOOD TEST: CPT

## 2025-02-24 PROCEDURE — 84100 ASSAY OF PHOSPHORUS: CPT

## 2025-02-24 PROCEDURE — 2000000000 HC ICU R&B

## 2025-02-24 PROCEDURE — 6360000002 HC RX W HCPCS: Performed by: NURSE PRACTITIONER

## 2025-02-24 PROCEDURE — 2500000003 HC RX 250 WO HCPCS: Performed by: NURSE PRACTITIONER

## 2025-02-24 PROCEDURE — 6370000000 HC RX 637 (ALT 250 FOR IP): Performed by: INTERNAL MEDICINE

## 2025-02-24 PROCEDURE — 80048 BASIC METABOLIC PNL TOTAL CA: CPT

## 2025-02-24 PROCEDURE — 2580000003 HC RX 258: Performed by: NURSE PRACTITIONER

## 2025-02-24 PROCEDURE — 85027 COMPLETE CBC AUTOMATED: CPT

## 2025-02-24 PROCEDURE — 36415 COLL VENOUS BLD VENIPUNCTURE: CPT

## 2025-02-24 PROCEDURE — 83735 ASSAY OF MAGNESIUM: CPT

## 2025-02-24 RX ORDER — METOPROLOL TARTRATE 25 MG/1
25 TABLET, FILM COATED ORAL 2 TIMES DAILY
Status: DISCONTINUED | OUTPATIENT
Start: 2025-02-24 | End: 2025-03-13

## 2025-02-24 RX ORDER — LIDOCAINE 4 G/G
1 PATCH TOPICAL DAILY
Status: DISCONTINUED | OUTPATIENT
Start: 2025-02-24 | End: 2025-03-20 | Stop reason: HOSPADM

## 2025-02-24 RX ADMIN — PIPERACILLIN AND TAZOBACTAM 4500 MG: 4; .5 INJECTION, POWDER, LYOPHILIZED, FOR SOLUTION INTRAVENOUS at 23:53

## 2025-02-24 RX ADMIN — IPRATROPIUM BROMIDE AND ALBUTEROL SULFATE 1 DOSE: 2.5; .5 SOLUTION RESPIRATORY (INHALATION) at 07:46

## 2025-02-24 RX ADMIN — IPRATROPIUM BROMIDE AND ALBUTEROL SULFATE 1 DOSE: 2.5; .5 SOLUTION RESPIRATORY (INHALATION) at 01:30

## 2025-02-24 RX ADMIN — SODIUM CHLORIDE: 0.9 INJECTION, SOLUTION INTRAVENOUS at 23:51

## 2025-02-24 RX ADMIN — METOPROLOL TARTRATE 25 MG: 25 TABLET, FILM COATED ORAL at 11:08

## 2025-02-24 RX ADMIN — IPRATROPIUM BROMIDE AND ALBUTEROL SULFATE 1 DOSE: 2.5; .5 SOLUTION RESPIRATORY (INHALATION) at 21:25

## 2025-02-24 RX ADMIN — INSULIN LISPRO 1 UNITS: 100 INJECTION, SOLUTION INTRAVENOUS; SUBCUTANEOUS at 17:30

## 2025-02-24 RX ADMIN — METHADONE HYDROCHLORIDE 5 MG: 10 TABLET ORAL at 20:22

## 2025-02-24 RX ADMIN — IPRATROPIUM BROMIDE AND ALBUTEROL SULFATE 1 DOSE: 2.5; .5 SOLUTION RESPIRATORY (INHALATION) at 13:31

## 2025-02-24 RX ADMIN — WATER 40 MG: 1 INJECTION INTRAMUSCULAR; INTRAVENOUS; SUBCUTANEOUS at 08:42

## 2025-02-24 RX ADMIN — Medication: at 20:26

## 2025-02-24 RX ADMIN — PANTOPRAZOLE SODIUM 40 MG: 40 TABLET, DELAYED RELEASE ORAL at 06:34

## 2025-02-24 RX ADMIN — SODIUM CHLORIDE, PRESERVATIVE FREE 10 ML: 5 INJECTION INTRAVENOUS at 20:26

## 2025-02-24 RX ADMIN — PIPERACILLIN AND TAZOBACTAM 4500 MG: 4; .5 INJECTION, POWDER, LYOPHILIZED, FOR SOLUTION INTRAVENOUS at 08:44

## 2025-02-24 RX ADMIN — METOPROLOL TARTRATE 25 MG: 25 TABLET, FILM COATED ORAL at 20:24

## 2025-02-24 RX ADMIN — Medication: at 08:43

## 2025-02-24 RX ADMIN — Medication 1 AMPULE: at 08:42

## 2025-02-24 RX ADMIN — APIXABAN 5 MG: 5 TABLET, FILM COATED ORAL at 08:43

## 2025-02-24 RX ADMIN — PIPERACILLIN AND TAZOBACTAM 4500 MG: 4; .5 INJECTION, POWDER, LYOPHILIZED, FOR SOLUTION INTRAVENOUS at 16:24

## 2025-02-24 RX ADMIN — INSULIN LISPRO 1 UNITS: 100 INJECTION, SOLUTION INTRAVENOUS; SUBCUTANEOUS at 12:20

## 2025-02-24 RX ADMIN — Medication 1 AMPULE: at 20:22

## 2025-02-24 RX ADMIN — INSULIN LISPRO 1 UNITS: 100 INJECTION, SOLUTION INTRAVENOUS; SUBCUTANEOUS at 20:25

## 2025-02-24 RX ADMIN — METHADONE HYDROCHLORIDE 5 MG: 10 TABLET ORAL at 08:42

## 2025-02-24 RX ADMIN — SODIUM CHLORIDE, PRESERVATIVE FREE 10 ML: 5 INJECTION INTRAVENOUS at 08:43

## 2025-02-24 RX ADMIN — PIPERACILLIN AND TAZOBACTAM 4500 MG: 4; .5 INJECTION, POWDER, LYOPHILIZED, FOR SOLUTION INTRAVENOUS at 00:31

## 2025-02-24 ASSESSMENT — PAIN SCALES - GENERAL
PAINLEVEL_OUTOF10: 4
PAINLEVEL_OUTOF10: 4
PAINLEVEL_OUTOF10: 0
PAINLEVEL_OUTOF10: 0

## 2025-02-24 ASSESSMENT — PAIN DESCRIPTION - LOCATION: LOCATION: CHEST;HEAD

## 2025-02-24 NOTE — INTERDISCIPLINARY ROUNDS
Interdisciplinary team rounds were held 2/24/2025 with the following team members: Physician, Nursing, Dietitian, Pharmacist, , , and the CCU Charge RN.    Plan of care discussed. See clinical pathway and/or care plan for interventions and desired outcomes.

## 2025-02-25 LAB
ANION GAP SERPL CALC-SCNC: ABNORMAL MMOL/L (ref 2–12)
BUN SERPL-MCNC: 23 MG/DL (ref 6–20)
BUN/CREAT SERPL: 26 (ref 12–20)
CALCIUM SERPL-MCNC: 10.3 MG/DL (ref 8.5–10.1)
CHLORIDE SERPL-SCNC: 100 MMOL/L (ref 97–108)
CO2 SERPL-SCNC: 41 MMOL/L (ref 21–32)
CREAT SERPL-MCNC: 0.88 MG/DL (ref 0.55–1.02)
ERYTHROCYTE [DISTWIDTH] IN BLOOD BY AUTOMATED COUNT: 13.4 % (ref 11.5–14.5)
GLUCOSE BLD STRIP.AUTO-MCNC: 109 MG/DL (ref 65–117)
GLUCOSE BLD STRIP.AUTO-MCNC: 162 MG/DL (ref 65–117)
GLUCOSE BLD STRIP.AUTO-MCNC: 169 MG/DL (ref 65–117)
GLUCOSE BLD STRIP.AUTO-MCNC: 216 MG/DL (ref 65–117)
GLUCOSE SERPL-MCNC: 150 MG/DL (ref 65–100)
HCT VFR BLD AUTO: 40.7 % (ref 35–47)
HGB BLD-MCNC: 11.5 G/DL (ref 11.5–16)
MAGNESIUM SERPL-MCNC: 2.1 MG/DL (ref 1.6–2.4)
MCH RBC QN AUTO: 28.5 PG (ref 26–34)
MCHC RBC AUTO-ENTMCNC: 28.3 G/DL (ref 30–36.5)
MCV RBC AUTO: 100.7 FL (ref 80–99)
NRBC # BLD: 0 K/UL (ref 0–0.01)
NRBC BLD-RTO: 0 PER 100 WBC
PHOSPHATE SERPL-MCNC: 2.7 MG/DL (ref 2.6–4.7)
PLATELET # BLD AUTO: 118 K/UL (ref 150–400)
PMV BLD AUTO: 10.1 FL (ref 8.9–12.9)
POTASSIUM SERPL-SCNC: 4.5 MMOL/L (ref 3.5–5.1)
RBC # BLD AUTO: 4.04 M/UL (ref 3.8–5.2)
SERVICE CMNT-IMP: ABNORMAL
SERVICE CMNT-IMP: NORMAL
SODIUM SERPL-SCNC: 139 MMOL/L (ref 136–145)
VANCOMYCIN SERPL-MCNC: 8 UG/ML
WBC # BLD AUTO: 9 K/UL (ref 3.6–11)

## 2025-02-25 PROCEDURE — 5A0935A ASSISTANCE WITH RESPIRATORY VENTILATION, LESS THAN 24 CONSECUTIVE HOURS, HIGH NASAL FLOW/VELOCITY: ICD-10-PCS | Performed by: INTERNAL MEDICINE

## 2025-02-25 PROCEDURE — 2700000000 HC OXYGEN THERAPY PER DAY

## 2025-02-25 PROCEDURE — 80202 ASSAY OF VANCOMYCIN: CPT

## 2025-02-25 PROCEDURE — 80048 BASIC METABOLIC PNL TOTAL CA: CPT

## 2025-02-25 PROCEDURE — 6370000000 HC RX 637 (ALT 250 FOR IP): Performed by: INTERNAL MEDICINE

## 2025-02-25 PROCEDURE — 6370000000 HC RX 637 (ALT 250 FOR IP): Performed by: NURSE PRACTITIONER

## 2025-02-25 PROCEDURE — 6360000002 HC RX W HCPCS: Performed by: HOSPITALIST

## 2025-02-25 PROCEDURE — 85027 COMPLETE CBC AUTOMATED: CPT

## 2025-02-25 PROCEDURE — 84100 ASSAY OF PHOSPHORUS: CPT

## 2025-02-25 PROCEDURE — 82962 GLUCOSE BLOOD TEST: CPT

## 2025-02-25 PROCEDURE — 2500000003 HC RX 250 WO HCPCS: Performed by: HOSPITALIST

## 2025-02-25 PROCEDURE — 6360000002 HC RX W HCPCS: Performed by: NURSE PRACTITIONER

## 2025-02-25 PROCEDURE — 94640 AIRWAY INHALATION TREATMENT: CPT

## 2025-02-25 PROCEDURE — 36415 COLL VENOUS BLD VENIPUNCTURE: CPT

## 2025-02-25 PROCEDURE — 2580000003 HC RX 258: Performed by: HOSPITALIST

## 2025-02-25 PROCEDURE — 2060000000 HC ICU INTERMEDIATE R&B

## 2025-02-25 PROCEDURE — 2500000003 HC RX 250 WO HCPCS: Performed by: NURSE PRACTITIONER

## 2025-02-25 PROCEDURE — 83735 ASSAY OF MAGNESIUM: CPT

## 2025-02-25 RX ORDER — PREDNISONE 20 MG/1
40 TABLET ORAL DAILY
Status: COMPLETED | OUTPATIENT
Start: 2025-02-26 | End: 2025-02-27

## 2025-02-25 RX ORDER — ENOXAPARIN SODIUM 150 MG/ML
1 INJECTION SUBCUTANEOUS 2 TIMES DAILY
Status: DISCONTINUED | OUTPATIENT
Start: 2025-02-25 | End: 2025-02-28

## 2025-02-25 RX ORDER — AMMONIUM LACTATE 12 G/100G
LOTION TOPICAL
Status: DISCONTINUED | OUTPATIENT
Start: 2025-02-25 | End: 2025-03-20 | Stop reason: HOSPADM

## 2025-02-25 RX ADMIN — IPRATROPIUM BROMIDE AND ALBUTEROL SULFATE 1 DOSE: 2.5; .5 SOLUTION RESPIRATORY (INHALATION) at 13:34

## 2025-02-25 RX ADMIN — METHADONE HYDROCHLORIDE 5 MG: 10 TABLET ORAL at 09:14

## 2025-02-25 RX ADMIN — INSULIN LISPRO 1 UNITS: 100 INJECTION, SOLUTION INTRAVENOUS; SUBCUTANEOUS at 16:42

## 2025-02-25 RX ADMIN — IPRATROPIUM BROMIDE AND ALBUTEROL SULFATE 1 DOSE: 2.5; .5 SOLUTION RESPIRATORY (INHALATION) at 08:05

## 2025-02-25 RX ADMIN — PANTOPRAZOLE SODIUM 40 MG: 40 TABLET, DELAYED RELEASE ORAL at 06:01

## 2025-02-25 RX ADMIN — METHADONE HYDROCHLORIDE 5 MG: 10 TABLET ORAL at 22:37

## 2025-02-25 RX ADMIN — SODIUM CHLORIDE, PRESERVATIVE FREE 10 ML: 5 INJECTION INTRAVENOUS at 09:20

## 2025-02-25 RX ADMIN — IPRATROPIUM BROMIDE AND ALBUTEROL SULFATE 1 DOSE: 2.5; .5 SOLUTION RESPIRATORY (INHALATION) at 01:50

## 2025-02-25 RX ADMIN — ENOXAPARIN SODIUM 150 MG: 150 INJECTION SUBCUTANEOUS at 22:15

## 2025-02-25 RX ADMIN — PIPERACILLIN AND TAZOBACTAM 4500 MG: 4; .5 INJECTION, POWDER, LYOPHILIZED, FOR SOLUTION INTRAVENOUS at 09:21

## 2025-02-25 RX ADMIN — WATER 1000 MG: 1 INJECTION INTRAMUSCULAR; INTRAVENOUS; SUBCUTANEOUS at 22:30

## 2025-02-25 RX ADMIN — METOPROLOL TARTRATE 25 MG: 25 TABLET, FILM COATED ORAL at 22:37

## 2025-02-25 RX ADMIN — Medication 1 AMPULE: at 09:16

## 2025-02-25 RX ADMIN — METOPROLOL TARTRATE 25 MG: 25 TABLET, FILM COATED ORAL at 09:16

## 2025-02-25 RX ADMIN — WATER 40 MG: 1 INJECTION INTRAMUSCULAR; INTRAVENOUS; SUBCUTANEOUS at 09:16

## 2025-02-25 RX ADMIN — Medication: at 09:17

## 2025-02-25 RX ADMIN — IPRATROPIUM BROMIDE AND ALBUTEROL SULFATE 1 DOSE: 2.5; .5 SOLUTION RESPIRATORY (INHALATION) at 21:33

## 2025-02-25 RX ADMIN — SODIUM CHLORIDE, PRESERVATIVE FREE 10 ML: 5 INJECTION INTRAVENOUS at 22:32

## 2025-02-25 RX ADMIN — Medication: at 22:36

## 2025-02-25 RX ADMIN — Medication 1 AMPULE: at 22:45

## 2025-02-25 ASSESSMENT — PAIN SCALES - GENERAL: PAINLEVEL_OUTOF10: 0

## 2025-02-25 NOTE — INTERDISCIPLINARY ROUNDS
Interdisciplinary team rounds were held 2/25/2025 with the following team members: Physician, Nursing, Dietitian, Pharmacist, , and the CCU Charge RN.    Plan of care discussed. See clinical pathway and/or care plan for interventions and desired outcomes.    Goals of the Day: Transfer out of CCU.

## 2025-02-26 ENCOUNTER — APPOINTMENT (OUTPATIENT)
Facility: HOSPITAL | Age: 55
DRG: 189 | End: 2025-02-26
Payer: MEDICARE

## 2025-02-26 LAB
ANION GAP SERPL CALC-SCNC: 3 MMOL/L (ref 2–12)
ARTERIAL PATENCY WRIST A: YES
BASE EXCESS BLDA CALC-SCNC: 10.9 MMOL/L
BASOPHILS # BLD: 0.02 K/UL (ref 0–0.1)
BASOPHILS NFR BLD: 0.2 % (ref 0–1)
BDY SITE: ABNORMAL
BUN SERPL-MCNC: 21 MG/DL (ref 6–20)
BUN/CREAT SERPL: 38 (ref 12–20)
CALCIUM SERPL-MCNC: 10.7 MG/DL (ref 8.5–10.1)
CHLORIDE SERPL-SCNC: 100 MMOL/L (ref 97–108)
CO2 SERPL-SCNC: 35 MMOL/L (ref 21–32)
CREAT SERPL-MCNC: 0.55 MG/DL (ref 0.55–1.02)
DIFFERENTIAL METHOD BLD: ABNORMAL
EOSINOPHIL # BLD: 0.02 K/UL (ref 0–0.4)
EOSINOPHIL NFR BLD: 0.2 % (ref 0–7)
ERYTHROCYTE [DISTWIDTH] IN BLOOD BY AUTOMATED COUNT: 13.6 % (ref 11.5–14.5)
GLUCOSE BLD STRIP.AUTO-MCNC: 101 MG/DL (ref 65–117)
GLUCOSE BLD STRIP.AUTO-MCNC: 120 MG/DL (ref 65–117)
GLUCOSE BLD STRIP.AUTO-MCNC: 150 MG/DL (ref 65–117)
GLUCOSE BLD STRIP.AUTO-MCNC: 184 MG/DL (ref 65–117)
GLUCOSE SERPL-MCNC: 123 MG/DL (ref 65–100)
HCO3 BLDA-SCNC: 41 MMOL/L (ref 22–26)
HCT VFR BLD AUTO: 42.2 % (ref 35–47)
HGB BLD-MCNC: 11.8 G/DL (ref 11.5–16)
IMM GRANULOCYTES # BLD AUTO: 0.07 K/UL (ref 0–0.04)
IMM GRANULOCYTES NFR BLD AUTO: 0.8 % (ref 0–0.5)
LYMPHOCYTES # BLD: 0.65 K/UL (ref 0.8–3.5)
LYMPHOCYTES NFR BLD: 7.3 % (ref 12–49)
MAGNESIUM SERPL-MCNC: 2 MG/DL (ref 1.6–2.4)
MCH RBC QN AUTO: 28.5 PG (ref 26–34)
MCHC RBC AUTO-ENTMCNC: 28 G/DL (ref 30–36.5)
MCV RBC AUTO: 101.9 FL (ref 80–99)
MONOCYTES # BLD: 1.14 K/UL (ref 0–1)
MONOCYTES NFR BLD: 12.9 % (ref 5–13)
NEUTS SEG # BLD: 6.97 K/UL (ref 1.8–8)
NEUTS SEG NFR BLD: 78.6 % (ref 32–75)
NRBC # BLD: 0 K/UL (ref 0–0.01)
NRBC BLD-RTO: 0 PER 100 WBC
PCO2 BLDA: 80 MMHG (ref 35–45)
PH BLDA: 7.32 (ref 7.35–7.45)
PHOSPHATE SERPL-MCNC: 2.6 MG/DL (ref 2.6–4.7)
PLATELET # BLD AUTO: 128 K/UL (ref 150–400)
PMV BLD AUTO: 10.3 FL (ref 8.9–12.9)
PO2 BLDA: 91 MMHG (ref 80–100)
POTASSIUM SERPL-SCNC: 5 MMOL/L (ref 3.5–5.1)
RBC # BLD AUTO: 4.14 M/UL (ref 3.8–5.2)
SAO2 % BLD: 96 % (ref 92–97)
SAO2% DEVICE SAO2% SENSOR NAME: ABNORMAL
SERVICE CMNT-IMP: ABNORMAL
SERVICE CMNT-IMP: NORMAL
SODIUM SERPL-SCNC: 138 MMOL/L (ref 136–145)
SPECIMEN SITE: ABNORMAL
WBC # BLD AUTO: 8.9 K/UL (ref 3.6–11)

## 2025-02-26 PROCEDURE — 6360000002 HC RX W HCPCS: Performed by: PHYSICIAN ASSISTANT

## 2025-02-26 PROCEDURE — 82962 GLUCOSE BLOOD TEST: CPT

## 2025-02-26 PROCEDURE — 84100 ASSAY OF PHOSPHORUS: CPT

## 2025-02-26 PROCEDURE — 85025 COMPLETE CBC W/AUTO DIFF WBC: CPT

## 2025-02-26 PROCEDURE — 80048 BASIC METABOLIC PNL TOTAL CA: CPT

## 2025-02-26 PROCEDURE — 2500000003 HC RX 250 WO HCPCS: Performed by: HOSPITALIST

## 2025-02-26 PROCEDURE — 2500000003 HC RX 250 WO HCPCS: Performed by: NURSE PRACTITIONER

## 2025-02-26 PROCEDURE — 2700000000 HC OXYGEN THERAPY PER DAY

## 2025-02-26 PROCEDURE — 6370000000 HC RX 637 (ALT 250 FOR IP): Performed by: INTERNAL MEDICINE

## 2025-02-26 PROCEDURE — 6370000000 HC RX 637 (ALT 250 FOR IP): Performed by: HOSPITALIST

## 2025-02-26 PROCEDURE — 76604 US EXAM CHEST: CPT

## 2025-02-26 PROCEDURE — 6370000000 HC RX 637 (ALT 250 FOR IP): Performed by: NURSE PRACTITIONER

## 2025-02-26 PROCEDURE — 82803 BLOOD GASES ANY COMBINATION: CPT

## 2025-02-26 PROCEDURE — 6360000002 HC RX W HCPCS: Performed by: HOSPITALIST

## 2025-02-26 PROCEDURE — 83735 ASSAY OF MAGNESIUM: CPT

## 2025-02-26 PROCEDURE — 36600 WITHDRAWAL OF ARTERIAL BLOOD: CPT

## 2025-02-26 PROCEDURE — 94660 CPAP INITIATION&MGMT: CPT

## 2025-02-26 PROCEDURE — 36415 COLL VENOUS BLD VENIPUNCTURE: CPT

## 2025-02-26 PROCEDURE — 94640 AIRWAY INHALATION TREATMENT: CPT

## 2025-02-26 PROCEDURE — 2060000000 HC ICU INTERMEDIATE R&B

## 2025-02-26 RX ORDER — IPRATROPIUM BROMIDE AND ALBUTEROL SULFATE 2.5; .5 MG/3ML; MG/3ML
1 SOLUTION RESPIRATORY (INHALATION)
Status: DISCONTINUED | OUTPATIENT
Start: 2025-02-26 | End: 2025-03-08

## 2025-02-26 RX ADMIN — IPRATROPIUM BROMIDE AND ALBUTEROL SULFATE 1 DOSE: 2.5; .5 SOLUTION RESPIRATORY (INHALATION) at 20:02

## 2025-02-26 RX ADMIN — ARFORMOTEROL TARTRATE: 15 SOLUTION RESPIRATORY (INHALATION) at 20:02

## 2025-02-26 RX ADMIN — Medication 1 AMPULE: at 21:39

## 2025-02-26 RX ADMIN — ENOXAPARIN SODIUM 150 MG: 150 INJECTION SUBCUTANEOUS at 21:36

## 2025-02-26 RX ADMIN — METOPROLOL TARTRATE 25 MG: 25 TABLET, FILM COATED ORAL at 09:35

## 2025-02-26 RX ADMIN — METHADONE HYDROCHLORIDE 5 MG: 10 TABLET ORAL at 09:35

## 2025-02-26 RX ADMIN — IPRATROPIUM BROMIDE AND ALBUTEROL SULFATE 1 DOSE: 2.5; .5 SOLUTION RESPIRATORY (INHALATION) at 13:46

## 2025-02-26 RX ADMIN — METOPROLOL TARTRATE 25 MG: 25 TABLET, FILM COATED ORAL at 21:57

## 2025-02-26 RX ADMIN — SODIUM CHLORIDE, PRESERVATIVE FREE 10 ML: 5 INJECTION INTRAVENOUS at 21:49

## 2025-02-26 RX ADMIN — Medication: at 09:37

## 2025-02-26 RX ADMIN — Medication: at 21:39

## 2025-02-26 RX ADMIN — WATER 1000 MG: 1 INJECTION INTRAMUSCULAR; INTRAVENOUS; SUBCUTANEOUS at 21:37

## 2025-02-26 RX ADMIN — PREDNISONE 40 MG: 20 TABLET ORAL at 09:35

## 2025-02-26 RX ADMIN — PANTOPRAZOLE SODIUM 40 MG: 40 TABLET, DELAYED RELEASE ORAL at 09:35

## 2025-02-26 RX ADMIN — IPRATROPIUM BROMIDE AND ALBUTEROL SULFATE 1 DOSE: 2.5; .5 SOLUTION RESPIRATORY (INHALATION) at 07:35

## 2025-02-26 RX ADMIN — INSULIN LISPRO 1 UNITS: 100 INJECTION, SOLUTION INTRAVENOUS; SUBCUTANEOUS at 21:58

## 2025-02-26 RX ADMIN — METHADONE HYDROCHLORIDE 5 MG: 10 TABLET ORAL at 21:56

## 2025-02-27 ENCOUNTER — APPOINTMENT (OUTPATIENT)
Facility: HOSPITAL | Age: 55
DRG: 189 | End: 2025-02-27
Payer: MEDICARE

## 2025-02-27 LAB
ANION GAP SERPL CALC-SCNC: ABNORMAL MMOL/L (ref 2–12)
ARTERIAL PATENCY WRIST A: YES
BASE EXCESS BLDA CALC-SCNC: 11.1 MMOL/L
BDY SITE: ABNORMAL
BUN SERPL-MCNC: 22 MG/DL (ref 6–20)
BUN/CREAT SERPL: 46 (ref 12–20)
CALCIUM SERPL-MCNC: 10.8 MG/DL (ref 8.5–10.1)
CHLORIDE SERPL-SCNC: 100 MMOL/L (ref 97–108)
CO2 SERPL-SCNC: 42 MMOL/L (ref 21–32)
CREAT SERPL-MCNC: 0.48 MG/DL (ref 0.55–1.02)
ERYTHROCYTE [DISTWIDTH] IN BLOOD BY AUTOMATED COUNT: 13.4 % (ref 11.5–14.5)
GAS FLOW.O2 O2 DELIVERY SYS: 6 L/MIN
GLUCOSE BLD STRIP.AUTO-MCNC: 191 MG/DL (ref 65–117)
GLUCOSE BLD STRIP.AUTO-MCNC: 217 MG/DL (ref 65–117)
GLUCOSE BLD STRIP.AUTO-MCNC: 248 MG/DL (ref 65–117)
GLUCOSE BLD STRIP.AUTO-MCNC: 84 MG/DL (ref 65–117)
GLUCOSE SERPL-MCNC: 92 MG/DL (ref 65–100)
HCO3 BLDA-SCNC: 41 MMOL/L (ref 22–26)
HCT VFR BLD AUTO: 42 % (ref 35–47)
HGB BLD-MCNC: 11.9 G/DL (ref 11.5–16)
MAGNESIUM SERPL-MCNC: 2 MG/DL (ref 1.6–2.4)
MCH RBC QN AUTO: 28.5 PG (ref 26–34)
MCHC RBC AUTO-ENTMCNC: 28.3 G/DL (ref 30–36.5)
MCV RBC AUTO: 100.7 FL (ref 80–99)
NRBC # BLD: 0 K/UL (ref 0–0.01)
NRBC BLD-RTO: 0 PER 100 WBC
PCO2 BLDA: 83 MMHG (ref 35–45)
PH BLDA: 7.31 (ref 7.35–7.45)
PHOSPHATE SERPL-MCNC: 2.1 MG/DL (ref 2.6–4.7)
PLATELET # BLD AUTO: 123 K/UL (ref 150–400)
PMV BLD AUTO: 10.3 FL (ref 8.9–12.9)
PO2 BLDA: 76 MMHG (ref 80–100)
POTASSIUM SERPL-SCNC: 4.3 MMOL/L (ref 3.5–5.1)
RBC # BLD AUTO: 4.17 M/UL (ref 3.8–5.2)
SAO2 % BLD: 93 % (ref 92–97)
SAO2% DEVICE SAO2% SENSOR NAME: ABNORMAL
SERVICE CMNT-IMP: ABNORMAL
SERVICE CMNT-IMP: NORMAL
SODIUM SERPL-SCNC: 141 MMOL/L (ref 136–145)
SPECIMEN SITE: ABNORMAL
WBC # BLD AUTO: 7.2 K/UL (ref 3.6–11)

## 2025-02-27 PROCEDURE — 85027 COMPLETE CBC AUTOMATED: CPT

## 2025-02-27 PROCEDURE — 36415 COLL VENOUS BLD VENIPUNCTURE: CPT

## 2025-02-27 PROCEDURE — 94660 CPAP INITIATION&MGMT: CPT

## 2025-02-27 PROCEDURE — 6370000000 HC RX 637 (ALT 250 FOR IP): Performed by: HOSPITALIST

## 2025-02-27 PROCEDURE — 2500000003 HC RX 250 WO HCPCS: Performed by: NURSE PRACTITIONER

## 2025-02-27 PROCEDURE — 6370000000 HC RX 637 (ALT 250 FOR IP): Performed by: NURSE PRACTITIONER

## 2025-02-27 PROCEDURE — 6360000002 HC RX W HCPCS: Performed by: HOSPITALIST

## 2025-02-27 PROCEDURE — 80048 BASIC METABOLIC PNL TOTAL CA: CPT

## 2025-02-27 PROCEDURE — 36600 WITHDRAWAL OF ARTERIAL BLOOD: CPT

## 2025-02-27 PROCEDURE — 94640 AIRWAY INHALATION TREATMENT: CPT

## 2025-02-27 PROCEDURE — 2700000000 HC OXYGEN THERAPY PER DAY

## 2025-02-27 PROCEDURE — 2060000000 HC ICU INTERMEDIATE R&B

## 2025-02-27 PROCEDURE — 71045 X-RAY EXAM CHEST 1 VIEW: CPT

## 2025-02-27 PROCEDURE — 83735 ASSAY OF MAGNESIUM: CPT

## 2025-02-27 PROCEDURE — 6360000002 HC RX W HCPCS: Performed by: PHYSICIAN ASSISTANT

## 2025-02-27 PROCEDURE — 6370000000 HC RX 637 (ALT 250 FOR IP): Performed by: INTERNAL MEDICINE

## 2025-02-27 PROCEDURE — 84100 ASSAY OF PHOSPHORUS: CPT

## 2025-02-27 PROCEDURE — 82962 GLUCOSE BLOOD TEST: CPT

## 2025-02-27 PROCEDURE — 82803 BLOOD GASES ANY COMBINATION: CPT

## 2025-02-27 RX ADMIN — POTASSIUM & SODIUM PHOSPHATES POWDER PACK 280-160-250 MG 1000 MG: 280-160-250 PACK at 09:39

## 2025-02-27 RX ADMIN — METHADONE HYDROCHLORIDE 5 MG: 10 TABLET ORAL at 20:34

## 2025-02-27 RX ADMIN — ENOXAPARIN SODIUM 150 MG: 150 INJECTION SUBCUTANEOUS at 20:35

## 2025-02-27 RX ADMIN — PREDNISONE 40 MG: 20 TABLET ORAL at 09:39

## 2025-02-27 RX ADMIN — Medication: at 20:35

## 2025-02-27 RX ADMIN — ARFORMOTEROL TARTRATE: 15 SOLUTION RESPIRATORY (INHALATION) at 20:19

## 2025-02-27 RX ADMIN — IPRATROPIUM BROMIDE AND ALBUTEROL SULFATE 1 DOSE: 2.5; .5 SOLUTION RESPIRATORY (INHALATION) at 20:14

## 2025-02-27 RX ADMIN — METHADONE HYDROCHLORIDE 5 MG: 10 TABLET ORAL at 09:30

## 2025-02-27 RX ADMIN — INSULIN LISPRO 1 UNITS: 100 INJECTION, SOLUTION INTRAVENOUS; SUBCUTANEOUS at 21:20

## 2025-02-27 RX ADMIN — INSULIN LISPRO 1 UNITS: 100 INJECTION, SOLUTION INTRAVENOUS; SUBCUTANEOUS at 18:00

## 2025-02-27 RX ADMIN — SODIUM CHLORIDE, PRESERVATIVE FREE 10 ML: 5 INJECTION INTRAVENOUS at 09:39

## 2025-02-27 RX ADMIN — Medication: at 09:40

## 2025-02-27 RX ADMIN — PANTOPRAZOLE SODIUM 40 MG: 40 TABLET, DELAYED RELEASE ORAL at 09:41

## 2025-02-27 RX ADMIN — POTASSIUM & SODIUM PHOSPHATES POWDER PACK 280-160-250 MG 1000 MG: 280-160-250 PACK at 11:47

## 2025-02-27 RX ADMIN — POTASSIUM & SODIUM PHOSPHATES POWDER PACK 280-160-250 MG 1000 MG: 280-160-250 PACK at 13:23

## 2025-02-27 RX ADMIN — ARFORMOTEROL TARTRATE: 15 SOLUTION RESPIRATORY (INHALATION) at 08:41

## 2025-02-27 RX ADMIN — METOPROLOL TARTRATE 25 MG: 25 TABLET, FILM COATED ORAL at 20:34

## 2025-02-27 RX ADMIN — INSULIN LISPRO 1 UNITS: 100 INJECTION, SOLUTION INTRAVENOUS; SUBCUTANEOUS at 13:22

## 2025-02-27 RX ADMIN — IPRATROPIUM BROMIDE AND ALBUTEROL SULFATE 1 DOSE: 2.5; .5 SOLUTION RESPIRATORY (INHALATION) at 13:52

## 2025-02-27 RX ADMIN — SODIUM CHLORIDE, PRESERVATIVE FREE 10 ML: 5 INJECTION INTRAVENOUS at 20:36

## 2025-02-27 RX ADMIN — ENOXAPARIN SODIUM 150 MG: 150 INJECTION SUBCUTANEOUS at 09:37

## 2025-02-27 RX ADMIN — IPRATROPIUM BROMIDE AND ALBUTEROL SULFATE 1 DOSE: 2.5; .5 SOLUTION RESPIRATORY (INHALATION) at 08:26

## 2025-02-27 RX ADMIN — METOPROLOL TARTRATE 25 MG: 25 TABLET, FILM COATED ORAL at 09:31

## 2025-02-27 ASSESSMENT — PAIN SCALES - GENERAL
PAINLEVEL_OUTOF10: 0
PAINLEVEL_OUTOF10: 8
PAINLEVEL_OUTOF10: 0

## 2025-02-27 ASSESSMENT — PAIN DESCRIPTION - LOCATION: LOCATION: LEG

## 2025-02-27 ASSESSMENT — PAIN DESCRIPTION - ONSET: ONSET: ON-GOING

## 2025-02-27 ASSESSMENT — PAIN DESCRIPTION - ORIENTATION: ORIENTATION: RIGHT;LEFT

## 2025-02-28 ENCOUNTER — APPOINTMENT (OUTPATIENT)
Facility: HOSPITAL | Age: 55
DRG: 189 | End: 2025-02-28
Attending: INTERNAL MEDICINE
Payer: MEDICARE

## 2025-02-28 LAB
ANION GAP SERPL CALC-SCNC: 0 MMOL/L (ref 2–12)
BUN SERPL-MCNC: 28 MG/DL (ref 6–20)
BUN/CREAT SERPL: 46 (ref 12–20)
CALCIUM SERPL-MCNC: 10.6 MG/DL (ref 8.5–10.1)
CHLORIDE SERPL-SCNC: 98 MMOL/L (ref 97–108)
CO2 SERPL-SCNC: 41 MMOL/L (ref 21–32)
CREAT SERPL-MCNC: 0.61 MG/DL (ref 0.55–1.02)
ERYTHROCYTE [DISTWIDTH] IN BLOOD BY AUTOMATED COUNT: 13.4 % (ref 11.5–14.5)
GLUCOSE BLD STRIP.AUTO-MCNC: 127 MG/DL (ref 65–117)
GLUCOSE BLD STRIP.AUTO-MCNC: 191 MG/DL (ref 65–117)
GLUCOSE BLD STRIP.AUTO-MCNC: 193 MG/DL (ref 65–117)
GLUCOSE BLD STRIP.AUTO-MCNC: 194 MG/DL (ref 65–117)
GLUCOSE SERPL-MCNC: 130 MG/DL (ref 65–100)
HCT VFR BLD AUTO: 41.2 % (ref 35–47)
HGB BLD-MCNC: 12 G/DL (ref 11.5–16)
MAGNESIUM SERPL-MCNC: 2 MG/DL (ref 1.6–2.4)
MCH RBC QN AUTO: 28.9 PG (ref 26–34)
MCHC RBC AUTO-ENTMCNC: 29.1 G/DL (ref 30–36.5)
MCV RBC AUTO: 99.3 FL (ref 80–99)
NRBC # BLD: 0.02 K/UL (ref 0–0.01)
NRBC BLD-RTO: 0.2 PER 100 WBC
PHOSPHATE SERPL-MCNC: 3.2 MG/DL (ref 2.6–4.7)
PLATELET # BLD AUTO: 128 K/UL (ref 150–400)
PMV BLD AUTO: 10.5 FL (ref 8.9–12.9)
POTASSIUM SERPL-SCNC: 4.5 MMOL/L (ref 3.5–5.1)
RBC # BLD AUTO: 4.15 M/UL (ref 3.8–5.2)
SERVICE CMNT-IMP: ABNORMAL
SODIUM SERPL-SCNC: 139 MMOL/L (ref 136–145)
WBC # BLD AUTO: 8.1 K/UL (ref 3.6–11)

## 2025-02-28 PROCEDURE — 83735 ASSAY OF MAGNESIUM: CPT

## 2025-02-28 PROCEDURE — 2060000000 HC ICU INTERMEDIATE R&B

## 2025-02-28 PROCEDURE — 6370000000 HC RX 637 (ALT 250 FOR IP): Performed by: NURSE PRACTITIONER

## 2025-02-28 PROCEDURE — 80048 BASIC METABOLIC PNL TOTAL CA: CPT

## 2025-02-28 PROCEDURE — 94640 AIRWAY INHALATION TREATMENT: CPT

## 2025-02-28 PROCEDURE — 97530 THERAPEUTIC ACTIVITIES: CPT

## 2025-02-28 PROCEDURE — 6370000000 HC RX 637 (ALT 250 FOR IP): Performed by: INTERNAL MEDICINE

## 2025-02-28 PROCEDURE — 6360000002 HC RX W HCPCS: Performed by: PHYSICIAN ASSISTANT

## 2025-02-28 PROCEDURE — 6360000002 HC RX W HCPCS: Performed by: HOSPITALIST

## 2025-02-28 PROCEDURE — 94660 CPAP INITIATION&MGMT: CPT

## 2025-02-28 PROCEDURE — 36415 COLL VENOUS BLD VENIPUNCTURE: CPT

## 2025-02-28 PROCEDURE — 85027 COMPLETE CBC AUTOMATED: CPT

## 2025-02-28 PROCEDURE — 2700000000 HC OXYGEN THERAPY PER DAY

## 2025-02-28 PROCEDURE — 97161 PT EVAL LOW COMPLEX 20 MIN: CPT

## 2025-02-28 PROCEDURE — 82962 GLUCOSE BLOOD TEST: CPT

## 2025-02-28 PROCEDURE — 84100 ASSAY OF PHOSPHORUS: CPT

## 2025-02-28 PROCEDURE — 93325 DOPPLER ECHO COLOR FLOW MAPG: CPT

## 2025-02-28 PROCEDURE — 2500000003 HC RX 250 WO HCPCS: Performed by: NURSE PRACTITIONER

## 2025-02-28 RX ADMIN — Medication: at 09:18

## 2025-02-28 RX ADMIN — ARFORMOTEROL TARTRATE: 15 SOLUTION RESPIRATORY (INHALATION) at 20:42

## 2025-02-28 RX ADMIN — METHADONE HYDROCHLORIDE 5 MG: 10 TABLET ORAL at 09:18

## 2025-02-28 RX ADMIN — IPRATROPIUM BROMIDE AND ALBUTEROL SULFATE 1 DOSE: 2.5; .5 SOLUTION RESPIRATORY (INHALATION) at 20:35

## 2025-02-28 RX ADMIN — IPRATROPIUM BROMIDE AND ALBUTEROL SULFATE 1 DOSE: 2.5; .5 SOLUTION RESPIRATORY (INHALATION) at 13:39

## 2025-02-28 RX ADMIN — IPRATROPIUM BROMIDE AND ALBUTEROL SULFATE 1 DOSE: 2.5; .5 SOLUTION RESPIRATORY (INHALATION) at 09:35

## 2025-02-28 RX ADMIN — INSULIN LISPRO 1 UNITS: 100 INJECTION, SOLUTION INTRAVENOUS; SUBCUTANEOUS at 17:39

## 2025-02-28 RX ADMIN — INSULIN LISPRO 1 UNITS: 100 INJECTION, SOLUTION INTRAVENOUS; SUBCUTANEOUS at 22:16

## 2025-02-28 RX ADMIN — INSULIN LISPRO 1 UNITS: 100 INJECTION, SOLUTION INTRAVENOUS; SUBCUTANEOUS at 13:03

## 2025-02-28 RX ADMIN — PANTOPRAZOLE SODIUM 40 MG: 40 TABLET, DELAYED RELEASE ORAL at 06:25

## 2025-02-28 RX ADMIN — Medication: at 22:17

## 2025-02-28 RX ADMIN — METOPROLOL TARTRATE 25 MG: 25 TABLET, FILM COATED ORAL at 09:17

## 2025-02-28 RX ADMIN — ARFORMOTEROL TARTRATE: 15 SOLUTION RESPIRATORY (INHALATION) at 09:28

## 2025-02-28 RX ADMIN — ENOXAPARIN SODIUM 150 MG: 150 INJECTION SUBCUTANEOUS at 09:18

## 2025-02-28 RX ADMIN — APIXABAN 5 MG: 5 TABLET, FILM COATED ORAL at 22:08

## 2025-02-28 RX ADMIN — METHADONE HYDROCHLORIDE 5 MG: 10 TABLET ORAL at 22:08

## 2025-02-28 RX ADMIN — SODIUM CHLORIDE, PRESERVATIVE FREE 10 ML: 5 INJECTION INTRAVENOUS at 09:19

## 2025-02-28 RX ADMIN — METOPROLOL TARTRATE 25 MG: 25 TABLET, FILM COATED ORAL at 22:08

## 2025-02-28 ASSESSMENT — PAIN SCALES - GENERAL
PAINLEVEL_OUTOF10: 0

## 2025-03-01 ENCOUNTER — APPOINTMENT (OUTPATIENT)
Facility: HOSPITAL | Age: 55
DRG: 189 | End: 2025-03-01
Payer: MEDICARE

## 2025-03-01 LAB
ANION GAP SERPL CALC-SCNC: 0 MMOL/L (ref 2–12)
BUN SERPL-MCNC: 24 MG/DL (ref 6–20)
BUN/CREAT SERPL: 37 (ref 12–20)
CALCIUM SERPL-MCNC: 9.9 MG/DL (ref 8.5–10.1)
CHLORIDE SERPL-SCNC: 98 MMOL/L (ref 97–108)
CO2 SERPL-SCNC: 43 MMOL/L (ref 21–32)
CREAT SERPL-MCNC: 0.65 MG/DL (ref 0.55–1.02)
ECHO BSA: 2.56 M2
ECHO LVOT AREA: 3.1 CM2
ECHO LVOT DIAM: 2 CM
ECHO MV MAX VELOCITY: 0.7 M/S
ECHO MV MEAN GRADIENT: 1 MMHG
ECHO MV MEAN VELOCITY: 0.3 M/S
ECHO MV PEAK GRADIENT: 2 MMHG
ECHO MV VTI: 7.6 CM
ERYTHROCYTE [DISTWIDTH] IN BLOOD BY AUTOMATED COUNT: 13.6 % (ref 11.5–14.5)
GLUCOSE BLD STRIP.AUTO-MCNC: 139 MG/DL (ref 65–117)
GLUCOSE BLD STRIP.AUTO-MCNC: 172 MG/DL (ref 65–117)
GLUCOSE BLD STRIP.AUTO-MCNC: 209 MG/DL (ref 65–117)
GLUCOSE BLD STRIP.AUTO-MCNC: 231 MG/DL (ref 65–117)
GLUCOSE SERPL-MCNC: 162 MG/DL (ref 65–100)
HCT VFR BLD AUTO: 39.4 % (ref 35–47)
HGB BLD-MCNC: 11.3 G/DL (ref 11.5–16)
MAGNESIUM SERPL-MCNC: 1.8 MG/DL (ref 1.6–2.4)
MCH RBC QN AUTO: 29.1 PG (ref 26–34)
MCHC RBC AUTO-ENTMCNC: 28.7 G/DL (ref 30–36.5)
MCV RBC AUTO: 101.5 FL (ref 80–99)
NRBC # BLD: 0.03 K/UL (ref 0–0.01)
NRBC BLD-RTO: 0.4 PER 100 WBC
PHOSPHATE SERPL-MCNC: 3.8 MG/DL (ref 2.6–4.7)
PLATELET # BLD AUTO: 127 K/UL (ref 150–400)
PMV BLD AUTO: 10.4 FL (ref 8.9–12.9)
POTASSIUM SERPL-SCNC: 3.7 MMOL/L (ref 3.5–5.1)
RBC # BLD AUTO: 3.88 M/UL (ref 3.8–5.2)
SERVICE CMNT-IMP: ABNORMAL
SODIUM SERPL-SCNC: 141 MMOL/L (ref 136–145)
WBC # BLD AUTO: 7.1 K/UL (ref 3.6–11)

## 2025-03-01 PROCEDURE — 71045 X-RAY EXAM CHEST 1 VIEW: CPT

## 2025-03-01 PROCEDURE — 85027 COMPLETE CBC AUTOMATED: CPT

## 2025-03-01 PROCEDURE — 97165 OT EVAL LOW COMPLEX 30 MIN: CPT

## 2025-03-01 PROCEDURE — 80048 BASIC METABOLIC PNL TOTAL CA: CPT

## 2025-03-01 PROCEDURE — 6370000000 HC RX 637 (ALT 250 FOR IP): Performed by: INTERNAL MEDICINE

## 2025-03-01 PROCEDURE — 94640 AIRWAY INHALATION TREATMENT: CPT

## 2025-03-01 PROCEDURE — 83735 ASSAY OF MAGNESIUM: CPT

## 2025-03-01 PROCEDURE — 2500000003 HC RX 250 WO HCPCS: Performed by: NURSE PRACTITIONER

## 2025-03-01 PROCEDURE — 82962 GLUCOSE BLOOD TEST: CPT

## 2025-03-01 PROCEDURE — 84100 ASSAY OF PHOSPHORUS: CPT

## 2025-03-01 PROCEDURE — 2700000000 HC OXYGEN THERAPY PER DAY

## 2025-03-01 PROCEDURE — 36415 COLL VENOUS BLD VENIPUNCTURE: CPT

## 2025-03-01 PROCEDURE — 97535 SELF CARE MNGMENT TRAINING: CPT

## 2025-03-01 PROCEDURE — 94660 CPAP INITIATION&MGMT: CPT

## 2025-03-01 PROCEDURE — 6370000000 HC RX 637 (ALT 250 FOR IP): Performed by: NURSE PRACTITIONER

## 2025-03-01 PROCEDURE — 2060000000 HC ICU INTERMEDIATE R&B

## 2025-03-01 PROCEDURE — 6360000002 HC RX W HCPCS: Performed by: PHYSICIAN ASSISTANT

## 2025-03-01 PROCEDURE — 97530 THERAPEUTIC ACTIVITIES: CPT

## 2025-03-01 RX ORDER — CETIRIZINE HYDROCHLORIDE 10 MG/1
10 TABLET ORAL NIGHTLY
Status: DISCONTINUED | OUTPATIENT
Start: 2025-03-02 | End: 2025-03-20 | Stop reason: HOSPADM

## 2025-03-01 RX ORDER — CETIRIZINE HYDROCHLORIDE 10 MG/1
10 TABLET ORAL DAILY
Status: DISCONTINUED | OUTPATIENT
Start: 2025-03-01 | End: 2025-03-01

## 2025-03-01 RX ORDER — HYDROMORPHONE HYDROCHLORIDE 2 MG/1
2 TABLET ORAL EVERY 6 HOURS PRN
Status: DISCONTINUED | OUTPATIENT
Start: 2025-03-01 | End: 2025-03-20 | Stop reason: HOSPADM

## 2025-03-01 RX ORDER — ONDANSETRON 4 MG/1
4 TABLET, ORALLY DISINTEGRATING ORAL EVERY 8 HOURS PRN
Qty: 21 TABLET | Refills: 0 | Status: SHIPPED | OUTPATIENT
Start: 2025-03-01

## 2025-03-01 RX ADMIN — METOPROLOL TARTRATE 25 MG: 25 TABLET, FILM COATED ORAL at 08:21

## 2025-03-01 RX ADMIN — ACETAMINOPHEN 650 MG: 325 TABLET ORAL at 14:12

## 2025-03-01 RX ADMIN — IPRATROPIUM BROMIDE AND ALBUTEROL SULFATE 1 DOSE: 2.5; .5 SOLUTION RESPIRATORY (INHALATION) at 13:28

## 2025-03-01 RX ADMIN — SODIUM CHLORIDE, PRESERVATIVE FREE 10 ML: 5 INJECTION INTRAVENOUS at 08:22

## 2025-03-01 RX ADMIN — SODIUM CHLORIDE, PRESERVATIVE FREE 10 ML: 5 INJECTION INTRAVENOUS at 20:43

## 2025-03-01 RX ADMIN — METHADONE HYDROCHLORIDE 5 MG: 10 TABLET ORAL at 08:21

## 2025-03-01 RX ADMIN — IPRATROPIUM BROMIDE AND ALBUTEROL SULFATE 1 DOSE: 2.5; .5 SOLUTION RESPIRATORY (INHALATION) at 22:15

## 2025-03-01 RX ADMIN — IPRATROPIUM BROMIDE AND ALBUTEROL SULFATE 1 DOSE: 2.5; .5 SOLUTION RESPIRATORY (INHALATION) at 07:35

## 2025-03-01 RX ADMIN — METHADONE HYDROCHLORIDE 5 MG: 10 TABLET ORAL at 20:38

## 2025-03-01 RX ADMIN — APIXABAN 5 MG: 5 TABLET, FILM COATED ORAL at 08:21

## 2025-03-01 RX ADMIN — Medication: at 20:42

## 2025-03-01 RX ADMIN — METOPROLOL TARTRATE 25 MG: 25 TABLET, FILM COATED ORAL at 20:38

## 2025-03-01 RX ADMIN — APIXABAN 5 MG: 5 TABLET, FILM COATED ORAL at 20:38

## 2025-03-01 RX ADMIN — PANTOPRAZOLE SODIUM 40 MG: 40 TABLET, DELAYED RELEASE ORAL at 06:04

## 2025-03-01 RX ADMIN — INSULIN LISPRO 1 UNITS: 100 INJECTION, SOLUTION INTRAVENOUS; SUBCUTANEOUS at 17:21

## 2025-03-01 RX ADMIN — INSULIN LISPRO 1 UNITS: 100 INJECTION, SOLUTION INTRAVENOUS; SUBCUTANEOUS at 13:12

## 2025-03-01 RX ADMIN — ARFORMOTEROL TARTRATE: 15 SOLUTION RESPIRATORY (INHALATION) at 22:20

## 2025-03-01 RX ADMIN — Medication: at 08:23

## 2025-03-01 RX ADMIN — ARFORMOTEROL TARTRATE: 15 SOLUTION RESPIRATORY (INHALATION) at 07:35

## 2025-03-01 ASSESSMENT — PAIN SCALES - GENERAL
PAINLEVEL_OUTOF10: 0
PAINLEVEL_OUTOF10: 6
PAINLEVEL_OUTOF10: 0
PAINLEVEL_OUTOF10: 6
PAINLEVEL_OUTOF10: 0

## 2025-03-01 ASSESSMENT — PAIN DESCRIPTION - DESCRIPTORS: DESCRIPTORS: ACHING

## 2025-03-01 ASSESSMENT — PAIN DESCRIPTION - ORIENTATION: ORIENTATION: POSTERIOR

## 2025-03-01 ASSESSMENT — PAIN DESCRIPTION - LOCATION
LOCATION: BACK
LOCATION: BUTTOCKS

## 2025-03-01 NOTE — DISCHARGE INSTRUCTIONS
Recommendations as per wound care nurse:    BLE and feet: daily wash legs with soap and water and while still moist apply Lac-Hydrin lotion to dry, scaly skin.    Sacrum buttocks and perineum: BID and PRN soiling, gently wash with soap and water, pat dry and apply zinc oxide cream (orange tube) to skin breakdown.

## 2025-03-02 LAB
ANION GAP SERPL CALC-SCNC: ABNORMAL MMOL/L (ref 2–12)
BUN SERPL-MCNC: 17 MG/DL (ref 6–20)
BUN/CREAT SERPL: 31 (ref 12–20)
CALCIUM SERPL-MCNC: 9.8 MG/DL (ref 8.5–10.1)
CHLORIDE SERPL-SCNC: 97 MMOL/L (ref 97–108)
CO2 SERPL-SCNC: 42 MMOL/L (ref 21–32)
CREAT SERPL-MCNC: 0.55 MG/DL (ref 0.55–1.02)
ERYTHROCYTE [DISTWIDTH] IN BLOOD BY AUTOMATED COUNT: 13.7 % (ref 11.5–14.5)
GLUCOSE BLD STRIP.AUTO-MCNC: 133 MG/DL (ref 65–117)
GLUCOSE BLD STRIP.AUTO-MCNC: 177 MG/DL (ref 65–117)
GLUCOSE BLD STRIP.AUTO-MCNC: 179 MG/DL (ref 65–117)
GLUCOSE BLD STRIP.AUTO-MCNC: 219 MG/DL (ref 65–117)
GLUCOSE SERPL-MCNC: 140 MG/DL (ref 65–100)
HCT VFR BLD AUTO: 39.6 % (ref 35–47)
HGB BLD-MCNC: 11.4 G/DL (ref 11.5–16)
MCH RBC QN AUTO: 28.6 PG (ref 26–34)
MCHC RBC AUTO-ENTMCNC: 28.8 G/DL (ref 30–36.5)
MCV RBC AUTO: 99.2 FL (ref 80–99)
NRBC # BLD: 0.02 K/UL (ref 0–0.01)
NRBC BLD-RTO: 0.2 PER 100 WBC
PLATELET # BLD AUTO: 136 K/UL (ref 150–400)
PMV BLD AUTO: 9.7 FL (ref 8.9–12.9)
POTASSIUM SERPL-SCNC: 3.8 MMOL/L (ref 3.5–5.1)
RBC # BLD AUTO: 3.99 M/UL (ref 3.8–5.2)
SERVICE CMNT-IMP: ABNORMAL
SODIUM SERPL-SCNC: 138 MMOL/L (ref 136–145)
WBC # BLD AUTO: 9.7 K/UL (ref 3.6–11)

## 2025-03-02 PROCEDURE — 94660 CPAP INITIATION&MGMT: CPT

## 2025-03-02 PROCEDURE — 6370000000 HC RX 637 (ALT 250 FOR IP): Performed by: NURSE PRACTITIONER

## 2025-03-02 PROCEDURE — 6370000000 HC RX 637 (ALT 250 FOR IP): Performed by: INTERNAL MEDICINE

## 2025-03-02 PROCEDURE — 2700000000 HC OXYGEN THERAPY PER DAY

## 2025-03-02 PROCEDURE — 82962 GLUCOSE BLOOD TEST: CPT

## 2025-03-02 PROCEDURE — 6370000000 HC RX 637 (ALT 250 FOR IP): Performed by: STUDENT IN AN ORGANIZED HEALTH CARE EDUCATION/TRAINING PROGRAM

## 2025-03-02 PROCEDURE — 6360000002 HC RX W HCPCS: Performed by: PHYSICIAN ASSISTANT

## 2025-03-02 PROCEDURE — 2500000003 HC RX 250 WO HCPCS: Performed by: STUDENT IN AN ORGANIZED HEALTH CARE EDUCATION/TRAINING PROGRAM

## 2025-03-02 PROCEDURE — 2060000000 HC ICU INTERMEDIATE R&B

## 2025-03-02 PROCEDURE — 2500000003 HC RX 250 WO HCPCS: Performed by: NURSE PRACTITIONER

## 2025-03-02 PROCEDURE — 80048 BASIC METABOLIC PNL TOTAL CA: CPT

## 2025-03-02 PROCEDURE — 85027 COMPLETE CBC AUTOMATED: CPT

## 2025-03-02 PROCEDURE — 36415 COLL VENOUS BLD VENIPUNCTURE: CPT

## 2025-03-02 PROCEDURE — 94640 AIRWAY INHALATION TREATMENT: CPT

## 2025-03-02 RX ORDER — GLUCAGON 1 MG/ML
1 KIT INJECTION PRN
Status: DISCONTINUED | OUTPATIENT
Start: 2025-03-02 | End: 2025-03-20 | Stop reason: HOSPADM

## 2025-03-02 RX ORDER — DEXTROSE MONOHYDRATE 100 MG/ML
INJECTION, SOLUTION INTRAVENOUS CONTINUOUS PRN
Status: DISCONTINUED | OUTPATIENT
Start: 2025-03-02 | End: 2025-03-20 | Stop reason: HOSPADM

## 2025-03-02 RX ORDER — INSULIN GLARGINE 100 [IU]/ML
10 INJECTION, SOLUTION SUBCUTANEOUS DAILY
Status: DISCONTINUED | OUTPATIENT
Start: 2025-03-02 | End: 2025-03-20 | Stop reason: HOSPADM

## 2025-03-02 RX ORDER — GUAIFENESIN 200 MG/10ML
400 LIQUID ORAL 4 TIMES DAILY
Status: COMPLETED | OUTPATIENT
Start: 2025-03-02 | End: 2025-03-03

## 2025-03-02 RX ORDER — IPRATROPIUM BROMIDE AND ALBUTEROL SULFATE 2.5; .5 MG/3ML; MG/3ML
1 SOLUTION RESPIRATORY (INHALATION) EVERY 8 HOURS PRN
Status: DISCONTINUED | OUTPATIENT
Start: 2025-03-02 | End: 2025-03-20 | Stop reason: HOSPADM

## 2025-03-02 RX ORDER — AMOXICILLIN 250 MG/1
500 CAPSULE ORAL EVERY 8 HOURS SCHEDULED
Status: DISPENSED | OUTPATIENT
Start: 2025-03-02 | End: 2025-03-07

## 2025-03-02 RX ADMIN — METHADONE HYDROCHLORIDE 5 MG: 10 TABLET ORAL at 09:56

## 2025-03-02 RX ADMIN — Medication: at 09:57

## 2025-03-02 RX ADMIN — Medication 5 DROP: at 16:49

## 2025-03-02 RX ADMIN — ACETAMINOPHEN 650 MG: 325 TABLET ORAL at 16:49

## 2025-03-02 RX ADMIN — APIXABAN 5 MG: 5 TABLET, FILM COATED ORAL at 20:18

## 2025-03-02 RX ADMIN — INSULIN LISPRO 2 UNITS: 100 INJECTION, SOLUTION INTRAVENOUS; SUBCUTANEOUS at 20:23

## 2025-03-02 RX ADMIN — SODIUM CHLORIDE, PRESERVATIVE FREE 5 ML: 5 INJECTION INTRAVENOUS at 20:24

## 2025-03-02 RX ADMIN — GUAIFENESIN 400 MG: 200 SOLUTION ORAL at 20:22

## 2025-03-02 RX ADMIN — METOPROLOL TARTRATE 25 MG: 25 TABLET, FILM COATED ORAL at 09:56

## 2025-03-02 RX ADMIN — IPRATROPIUM BROMIDE AND ALBUTEROL SULFATE 1 DOSE: 2.5; .5 SOLUTION RESPIRATORY (INHALATION) at 08:02

## 2025-03-02 RX ADMIN — GUAIFENESIN 400 MG: 200 SOLUTION ORAL at 16:49

## 2025-03-02 RX ADMIN — METHADONE HYDROCHLORIDE 5 MG: 10 TABLET ORAL at 20:15

## 2025-03-02 RX ADMIN — INSULIN LISPRO 1 UNITS: 100 INJECTION, SOLUTION INTRAVENOUS; SUBCUTANEOUS at 12:32

## 2025-03-02 RX ADMIN — PANTOPRAZOLE SODIUM 40 MG: 40 TABLET, DELAYED RELEASE ORAL at 06:23

## 2025-03-02 RX ADMIN — ARFORMOTEROL TARTRATE: 15 SOLUTION RESPIRATORY (INHALATION) at 08:02

## 2025-03-02 RX ADMIN — IPRATROPIUM BROMIDE AND ALBUTEROL SULFATE 1 DOSE: 2.5; .5 SOLUTION RESPIRATORY (INHALATION) at 13:43

## 2025-03-02 RX ADMIN — ARFORMOTEROL TARTRATE: 15 SOLUTION RESPIRATORY (INHALATION) at 20:15

## 2025-03-02 RX ADMIN — AMOXICILLIN 500 MG: 250 CAPSULE ORAL at 20:13

## 2025-03-02 RX ADMIN — GUAIFENESIN 400 MG: 200 SOLUTION ORAL at 12:31

## 2025-03-02 RX ADMIN — CETIRIZINE HYDROCHLORIDE 10 MG: 10 TABLET, FILM COATED ORAL at 20:16

## 2025-03-02 RX ADMIN — IPRATROPIUM BROMIDE AND ALBUTEROL SULFATE 1 DOSE: 2.5; .5 SOLUTION RESPIRATORY (INHALATION) at 20:10

## 2025-03-02 RX ADMIN — INSULIN GLARGINE 10 UNITS: 100 INJECTION, SOLUTION SUBCUTANEOUS at 12:40

## 2025-03-02 RX ADMIN — METOPROLOL TARTRATE 25 MG: 25 TABLET, FILM COATED ORAL at 20:19

## 2025-03-02 RX ADMIN — SODIUM CHLORIDE, PRESERVATIVE FREE 10 ML: 5 INJECTION INTRAVENOUS at 09:57

## 2025-03-02 RX ADMIN — APIXABAN 5 MG: 5 TABLET, FILM COATED ORAL at 09:56

## 2025-03-02 ASSESSMENT — PAIN SCALES - GENERAL
PAINLEVEL_OUTOF10: 0
PAINLEVEL_OUTOF10: 6

## 2025-03-02 ASSESSMENT — PAIN DESCRIPTION - LOCATION: LOCATION: GENERALIZED

## 2025-03-02 NOTE — RT PROTOCOL NOTE
ADULT PROTOCOL: JET AEROSOL ASSESSMENT    Patient  Jessica Mane     54 y.o.   female     3/2/2025  11:41 AM    Breath Sounds Pre Procedure: Breath Sounds Pre-Tx NITO: Diminished                                  Breath Sounds Pre-Tx LLL: Diminished        Breath Sounds Pre-Tx RUL: Diminished        Breath Sounds Pre-Tx RML: Diminished        Breath Sounds Pre-Tx RLL: Diminished  Breath Sounds Post Procedure: Breath Sounds Post-Tx NITO: Diminished          Breath Sounds Post-Tx LLL: Diminished          Breath Sounds Post-Tx RUL: Diminished          Breath Sounds Post-Tx RML: Diminished          Breath Sounds Post-Tx RLL: Diminished                                     Breathing pattern: Pre procedure  Regular          Post procedure   Regular    Heart Rate: Pre procedure Pre-Tx Pulse: 87           Post procedure Post-Tx Pulse: 82    Resp Rate: Pre procedure Pre-Tx Resps: 14           Post procedure Post-Tx Resps: 15     Oxygen: O2 Therapy: Oxygen humidified   6L NC    SpO2:  92-93%                Nebulizer Therapy: Current medications Medications: Arformoterol, Budesonide, Albuterol/Ipratropium              Problem List:   Patient Active Problem List   Diagnosis    Controlled diabetes mellitus type 2 with complications (HCC)    Pulmonary embolism (HCC)    Acute on chronic respiratory failure (HCC)    Counseling regarding advance care planning and goals of care    Tachycardia    Chronic pain syndrome    Carcinoma of breast metastatic to multiple sites (HCC)    Acute and chronic respiratory failure with hypoxia (HCC)    Recurrent bacteremia    Coagulase negative Staphylococcus bacteremia    Aspiration pneumonia of both upper lobes due to gastric secretions (HCC)    Full code status    Palliative care encounter    Hypomagnesemia    Community acquired pneumonia of left lung, unspecified part of lung    Influenza A (H1N1)    Hypercapnic respiratory failure (HCC)    Pain due to malignant neoplasm metastatic to bone (HCC)

## 2025-03-03 LAB
GLUCOSE BLD STRIP.AUTO-MCNC: 163 MG/DL (ref 65–117)
GLUCOSE BLD STRIP.AUTO-MCNC: 175 MG/DL (ref 65–117)
GLUCOSE BLD STRIP.AUTO-MCNC: 183 MG/DL (ref 65–117)
GLUCOSE BLD STRIP.AUTO-MCNC: 184 MG/DL (ref 65–117)
SERVICE CMNT-IMP: ABNORMAL

## 2025-03-03 PROCEDURE — 6370000000 HC RX 637 (ALT 250 FOR IP): Performed by: NURSE PRACTITIONER

## 2025-03-03 PROCEDURE — 6370000000 HC RX 637 (ALT 250 FOR IP): Performed by: STUDENT IN AN ORGANIZED HEALTH CARE EDUCATION/TRAINING PROGRAM

## 2025-03-03 PROCEDURE — 2500000003 HC RX 250 WO HCPCS: Performed by: STUDENT IN AN ORGANIZED HEALTH CARE EDUCATION/TRAINING PROGRAM

## 2025-03-03 PROCEDURE — 6360000002 HC RX W HCPCS: Performed by: PHYSICIAN ASSISTANT

## 2025-03-03 PROCEDURE — 6370000000 HC RX 637 (ALT 250 FOR IP): Performed by: INTERNAL MEDICINE

## 2025-03-03 PROCEDURE — 2060000000 HC ICU INTERMEDIATE R&B

## 2025-03-03 PROCEDURE — 82962 GLUCOSE BLOOD TEST: CPT

## 2025-03-03 PROCEDURE — 2700000000 HC OXYGEN THERAPY PER DAY

## 2025-03-03 PROCEDURE — 94640 AIRWAY INHALATION TREATMENT: CPT

## 2025-03-03 PROCEDURE — 2500000003 HC RX 250 WO HCPCS: Performed by: NURSE PRACTITIONER

## 2025-03-03 RX ORDER — GUAIFENESIN 200 MG/10ML
400 LIQUID ORAL 4 TIMES DAILY
Status: COMPLETED | OUTPATIENT
Start: 2025-03-03 | End: 2025-03-04

## 2025-03-03 RX ADMIN — AMOXICILLIN 500 MG: 250 CAPSULE ORAL at 21:29

## 2025-03-03 RX ADMIN — APIXABAN 5 MG: 5 TABLET, FILM COATED ORAL at 10:50

## 2025-03-03 RX ADMIN — ARFORMOTEROL TARTRATE: 15 SOLUTION RESPIRATORY (INHALATION) at 06:31

## 2025-03-03 RX ADMIN — AMOXICILLIN 500 MG: 250 CAPSULE ORAL at 12:52

## 2025-03-03 RX ADMIN — Medication 5 DROP: at 21:32

## 2025-03-03 RX ADMIN — SODIUM CHLORIDE, PRESERVATIVE FREE 10 ML: 5 INJECTION INTRAVENOUS at 23:28

## 2025-03-03 RX ADMIN — SODIUM CHLORIDE, PRESERVATIVE FREE 10 ML: 5 INJECTION INTRAVENOUS at 10:55

## 2025-03-03 RX ADMIN — METOPROLOL TARTRATE 25 MG: 25 TABLET, FILM COATED ORAL at 21:26

## 2025-03-03 RX ADMIN — GUAIFENESIN 400 MG: 200 SOLUTION ORAL at 10:51

## 2025-03-03 RX ADMIN — INSULIN GLARGINE 10 UNITS: 100 INJECTION, SOLUTION SUBCUTANEOUS at 10:44

## 2025-03-03 RX ADMIN — INSULIN LISPRO 1 UNITS: 100 INJECTION, SOLUTION INTRAVENOUS; SUBCUTANEOUS at 12:51

## 2025-03-03 RX ADMIN — ACETAMINOPHEN 650 MG: 325 TABLET ORAL at 18:34

## 2025-03-03 RX ADMIN — GUAIFENESIN 400 MG: 200 SOLUTION ORAL at 21:24

## 2025-03-03 RX ADMIN — IPRATROPIUM BROMIDE AND ALBUTEROL SULFATE 1 DOSE: 2.5; .5 SOLUTION RESPIRATORY (INHALATION) at 06:26

## 2025-03-03 RX ADMIN — AMOXICILLIN 500 MG: 250 CAPSULE ORAL at 06:12

## 2025-03-03 RX ADMIN — APIXABAN 5 MG: 5 TABLET, FILM COATED ORAL at 21:28

## 2025-03-03 RX ADMIN — METHADONE HYDROCHLORIDE 5 MG: 10 TABLET ORAL at 10:49

## 2025-03-03 RX ADMIN — METHADONE HYDROCHLORIDE 5 MG: 10 TABLET ORAL at 21:24

## 2025-03-03 RX ADMIN — IPRATROPIUM BROMIDE AND ALBUTEROL SULFATE 1 DOSE: 2.5; .5 SOLUTION RESPIRATORY (INHALATION) at 13:35

## 2025-03-03 RX ADMIN — HYDROMORPHONE HYDROCHLORIDE 2 MG: 2 TABLET ORAL at 06:22

## 2025-03-03 RX ADMIN — ARFORMOTEROL TARTRATE: 15 SOLUTION RESPIRATORY (INHALATION) at 19:31

## 2025-03-03 RX ADMIN — PANTOPRAZOLE SODIUM 40 MG: 40 TABLET, DELAYED RELEASE ORAL at 06:12

## 2025-03-03 RX ADMIN — METOPROLOL TARTRATE 25 MG: 25 TABLET, FILM COATED ORAL at 10:49

## 2025-03-03 RX ADMIN — CETIRIZINE HYDROCHLORIDE 10 MG: 10 TABLET, FILM COATED ORAL at 21:27

## 2025-03-03 RX ADMIN — IPRATROPIUM BROMIDE AND ALBUTEROL SULFATE 1 DOSE: 2.5; .5 SOLUTION RESPIRATORY (INHALATION) at 19:31

## 2025-03-03 ASSESSMENT — PAIN SCALES - GENERAL
PAINLEVEL_OUTOF10: 6
PAINLEVEL_OUTOF10: 5
PAINLEVEL_OUTOF10: 0
PAINLEVEL_OUTOF10: 5
PAINLEVEL_OUTOF10: 0
PAINLEVEL_OUTOF10: 3
PAINLEVEL_OUTOF10: 0

## 2025-03-03 ASSESSMENT — PAIN DESCRIPTION - LOCATION
LOCATION: GENERALIZED

## 2025-03-03 NOTE — PALLIATIVE CARE DISCHARGE
Goals of Care/Treatment Preferences    The Palliative Medicine team was consulted as part of your/your loved one's care in the hospital. Our team is a supportive service; we strive to relieve suffering and improve quality of life.    We reviewed advance care planning information, which includes the following:    Primary Decision Maker: Yunior Mane - Child - [phone]    Secondary Decision Maker: AlhajiAriana Florencia (Janae) - Brother/Sister - [phone]    Supplemental (Other) Decision Maker: Painter,June (Geraldo) (Sister-in-Law) - Other - [phone]     We reviewed / discussed your code status as:   Code Status: Full Code     “Full Code” means perform CPR in the event of cardiac arrest.      “DNR” means do NOT perform CPR in the event of cardiac arrest.      “Partial Code” means you have specific preferences, please discuss with your healthcare team.      “No Order” means this issue was not addressed / resolved during your stay     Other Instructions:   --Please follow physician's orders regarding using oxygen and other respiratory equipment to support your breathing and keep you home with your family instead of in the hospital or nursing home.    --Please continue to include your son and other family members in your medical appointments and to discuss your medical conditions and your wishes regarding what decisions you would want made on your behalf. That way, when your family needs to step in and make decisions they will know what you would and would not want to be done, given that you have serious ongoing breathing problems (COPD) and widespread cancer.     Because of the importance of this information, we are providing you with a printed copy to share with other healthcare providers after this hospitalization is complete.    Thank you for including Palliative team in your care, Ms. Mane.    Meaghan Lin, [facility]  Palliative Medicine [phone]

## 2025-03-04 LAB
ANION GAP SERPL CALC-SCNC: ABNORMAL MMOL/L (ref 2–12)
ANION GAP SERPL CALC-SCNC: ABNORMAL MMOL/L (ref 2–12)
BUN SERPL-MCNC: 12 MG/DL (ref 6–20)
BUN SERPL-MCNC: 12 MG/DL (ref 6–20)
BUN/CREAT SERPL: 19 (ref 12–20)
BUN/CREAT SERPL: 19 (ref 12–20)
CALCIUM SERPL-MCNC: 10.2 MG/DL (ref 8.5–10.1)
CALCIUM SERPL-MCNC: 10.6 MG/DL (ref 8.5–10.1)
CHLORIDE SERPL-SCNC: 99 MMOL/L (ref 97–108)
CHLORIDE SERPL-SCNC: 99 MMOL/L (ref 97–108)
CO2 SERPL-SCNC: 38 MMOL/L (ref 21–32)
CO2 SERPL-SCNC: 41 MMOL/L (ref 21–32)
CREAT SERPL-MCNC: 0.63 MG/DL (ref 0.55–1.02)
CREAT SERPL-MCNC: 0.64 MG/DL (ref 0.55–1.02)
ERYTHROCYTE [DISTWIDTH] IN BLOOD BY AUTOMATED COUNT: 13.6 % (ref 11.5–14.5)
GLUCOSE BLD STRIP.AUTO-MCNC: 147 MG/DL (ref 65–117)
GLUCOSE BLD STRIP.AUTO-MCNC: 185 MG/DL (ref 65–117)
GLUCOSE BLD STRIP.AUTO-MCNC: 203 MG/DL (ref 65–117)
GLUCOSE BLD STRIP.AUTO-MCNC: 210 MG/DL (ref 65–117)
GLUCOSE SERPL-MCNC: 168 MG/DL (ref 65–100)
GLUCOSE SERPL-MCNC: 177 MG/DL (ref 65–100)
HCT VFR BLD AUTO: 43 % (ref 35–47)
HGB BLD-MCNC: 11.8 G/DL (ref 11.5–16)
MAGNESIUM SERPL-MCNC: 1.7 MG/DL (ref 1.6–2.4)
MCH RBC QN AUTO: 28.4 PG (ref 26–34)
MCHC RBC AUTO-ENTMCNC: 27.4 G/DL (ref 30–36.5)
MCV RBC AUTO: 103.6 FL (ref 80–99)
NRBC # BLD: 0.03 K/UL (ref 0–0.01)
NRBC BLD-RTO: 0.3 PER 100 WBC
PHOSPHATE SERPL-MCNC: 2.8 MG/DL (ref 2.6–4.7)
PLATELET # BLD AUTO: 157 K/UL (ref 150–400)
PMV BLD AUTO: 10.2 FL (ref 8.9–12.9)
POTASSIUM SERPL-SCNC: 4.3 MMOL/L (ref 3.5–5.1)
POTASSIUM SERPL-SCNC: 5.4 MMOL/L (ref 3.5–5.1)
RBC # BLD AUTO: 4.15 M/UL (ref 3.8–5.2)
SERVICE CMNT-IMP: ABNORMAL
SODIUM SERPL-SCNC: 136 MMOL/L (ref 136–145)
SODIUM SERPL-SCNC: 138 MMOL/L (ref 136–145)
WBC # BLD AUTO: 11.2 K/UL (ref 3.6–11)

## 2025-03-04 PROCEDURE — 94761 N-INVAS EAR/PLS OXIMETRY MLT: CPT

## 2025-03-04 PROCEDURE — 6370000000 HC RX 637 (ALT 250 FOR IP): Performed by: INTERNAL MEDICINE

## 2025-03-04 PROCEDURE — 83735 ASSAY OF MAGNESIUM: CPT

## 2025-03-04 PROCEDURE — 6370000000 HC RX 637 (ALT 250 FOR IP): Performed by: STUDENT IN AN ORGANIZED HEALTH CARE EDUCATION/TRAINING PROGRAM

## 2025-03-04 PROCEDURE — 84100 ASSAY OF PHOSPHORUS: CPT

## 2025-03-04 PROCEDURE — 94660 CPAP INITIATION&MGMT: CPT

## 2025-03-04 PROCEDURE — 6370000000 HC RX 637 (ALT 250 FOR IP): Performed by: NURSE PRACTITIONER

## 2025-03-04 PROCEDURE — 80048 BASIC METABOLIC PNL TOTAL CA: CPT

## 2025-03-04 PROCEDURE — 2060000000 HC ICU INTERMEDIATE R&B

## 2025-03-04 PROCEDURE — 2500000003 HC RX 250 WO HCPCS: Performed by: STUDENT IN AN ORGANIZED HEALTH CARE EDUCATION/TRAINING PROGRAM

## 2025-03-04 PROCEDURE — 94640 AIRWAY INHALATION TREATMENT: CPT

## 2025-03-04 PROCEDURE — 6360000002 HC RX W HCPCS: Performed by: PHYSICIAN ASSISTANT

## 2025-03-04 PROCEDURE — 2700000000 HC OXYGEN THERAPY PER DAY

## 2025-03-04 PROCEDURE — 82962 GLUCOSE BLOOD TEST: CPT

## 2025-03-04 PROCEDURE — 85027 COMPLETE CBC AUTOMATED: CPT

## 2025-03-04 PROCEDURE — 36415 COLL VENOUS BLD VENIPUNCTURE: CPT

## 2025-03-04 PROCEDURE — 2500000003 HC RX 250 WO HCPCS: Performed by: NURSE PRACTITIONER

## 2025-03-04 RX ORDER — LACTOBACILLUS RHAMNOSUS GG 10B CELL
1 CAPSULE ORAL
Status: DISPENSED | OUTPATIENT
Start: 2025-03-04 | End: 2025-03-11

## 2025-03-04 RX ORDER — GUAIFENESIN 600 MG/1
600 TABLET, EXTENDED RELEASE ORAL 2 TIMES DAILY
Status: DISCONTINUED | OUTPATIENT
Start: 2025-03-04 | End: 2025-03-20 | Stop reason: HOSPADM

## 2025-03-04 RX ORDER — L.ACID/L.CASEI/B.BIF/B.LON/FOS 2B CELL-50
1 CAPSULE ORAL DAILY
Status: DISCONTINUED | OUTPATIENT
Start: 2025-03-04 | End: 2025-03-04

## 2025-03-04 RX ADMIN — METOPROLOL TARTRATE 25 MG: 25 TABLET, FILM COATED ORAL at 08:35

## 2025-03-04 RX ADMIN — HYDROMORPHONE HYDROCHLORIDE 2 MG: 2 TABLET ORAL at 14:13

## 2025-03-04 RX ADMIN — INSULIN LISPRO 1 UNITS: 100 INJECTION, SOLUTION INTRAVENOUS; SUBCUTANEOUS at 12:17

## 2025-03-04 RX ADMIN — INSULIN LISPRO 1 UNITS: 100 INJECTION, SOLUTION INTRAVENOUS; SUBCUTANEOUS at 17:26

## 2025-03-04 RX ADMIN — SODIUM ZIRCONIUM CYCLOSILICATE 10 G: 5 POWDER, FOR SUSPENSION ORAL at 11:12

## 2025-03-04 RX ADMIN — Medication 5 DROP: at 12:18

## 2025-03-04 RX ADMIN — INSULIN GLARGINE 10 UNITS: 100 INJECTION, SOLUTION SUBCUTANEOUS at 08:36

## 2025-03-04 RX ADMIN — METHADONE HYDROCHLORIDE 5 MG: 10 TABLET ORAL at 21:07

## 2025-03-04 RX ADMIN — IPRATROPIUM BROMIDE AND ALBUTEROL SULFATE 1 DOSE: 2.5; .5 SOLUTION RESPIRATORY (INHALATION) at 07:23

## 2025-03-04 RX ADMIN — GUAIFENESIN 600 MG: 600 TABLET, EXTENDED RELEASE ORAL at 21:20

## 2025-03-04 RX ADMIN — CETIRIZINE HYDROCHLORIDE 10 MG: 10 TABLET, FILM COATED ORAL at 21:20

## 2025-03-04 RX ADMIN — GUAIFENESIN 400 MG: 200 SOLUTION ORAL at 08:34

## 2025-03-04 RX ADMIN — PANTOPRAZOLE SODIUM 40 MG: 40 TABLET, DELAYED RELEASE ORAL at 08:05

## 2025-03-04 RX ADMIN — INSULIN LISPRO 1 UNITS: 100 INJECTION, SOLUTION INTRAVENOUS; SUBCUTANEOUS at 21:16

## 2025-03-04 RX ADMIN — GUAIFENESIN 400 MG: 200 SOLUTION ORAL at 12:16

## 2025-03-04 RX ADMIN — METHADONE HYDROCHLORIDE 5 MG: 10 TABLET ORAL at 08:35

## 2025-03-04 RX ADMIN — IPRATROPIUM BROMIDE AND ALBUTEROL SULFATE 1 DOSE: 2.5; .5 SOLUTION RESPIRATORY (INHALATION) at 19:20

## 2025-03-04 RX ADMIN — IPRATROPIUM BROMIDE AND ALBUTEROL SULFATE 1 DOSE: 2.5; .5 SOLUTION RESPIRATORY (INHALATION) at 13:36

## 2025-03-04 RX ADMIN — Medication 5 DROP: at 17:27

## 2025-03-04 RX ADMIN — Medication: at 11:14

## 2025-03-04 RX ADMIN — Medication: at 21:20

## 2025-03-04 RX ADMIN — AMOXICILLIN 500 MG: 250 CAPSULE ORAL at 14:03

## 2025-03-04 RX ADMIN — Medication 5 DROP: at 21:22

## 2025-03-04 RX ADMIN — AMOXICILLIN 500 MG: 250 CAPSULE ORAL at 04:58

## 2025-03-04 RX ADMIN — ARFORMOTEROL TARTRATE: 15 SOLUTION RESPIRATORY (INHALATION) at 07:20

## 2025-03-04 RX ADMIN — METOPROLOL TARTRATE 25 MG: 25 TABLET, FILM COATED ORAL at 21:17

## 2025-03-04 RX ADMIN — ARFORMOTEROL TARTRATE: 15 SOLUTION RESPIRATORY (INHALATION) at 19:20

## 2025-03-04 RX ADMIN — Medication 1 CAPSULE: at 19:49

## 2025-03-04 RX ADMIN — APIXABAN 5 MG: 5 TABLET, FILM COATED ORAL at 21:16

## 2025-03-04 RX ADMIN — AMOXICILLIN 500 MG: 250 CAPSULE ORAL at 21:35

## 2025-03-04 RX ADMIN — APIXABAN 5 MG: 5 TABLET, FILM COATED ORAL at 08:35

## 2025-03-04 RX ADMIN — Medication 5 DROP: at 08:39

## 2025-03-04 RX ADMIN — SODIUM CHLORIDE, PRESERVATIVE FREE 10 ML: 5 INJECTION INTRAVENOUS at 08:38

## 2025-03-04 RX ADMIN — GUAIFENESIN 400 MG: 200 SOLUTION ORAL at 17:27

## 2025-03-04 RX ADMIN — SODIUM CHLORIDE, PRESERVATIVE FREE 10 ML: 5 INJECTION INTRAVENOUS at 21:21

## 2025-03-04 ASSESSMENT — PAIN DESCRIPTION - LOCATION
LOCATION: GENERALIZED
LOCATION: BACK;SHOULDER
LOCATION: BACK

## 2025-03-04 ASSESSMENT — PAIN SCALES - GENERAL
PAINLEVEL_OUTOF10: 0
PAINLEVEL_OUTOF10: 4
PAINLEVEL_OUTOF10: 0
PAINLEVEL_OUTOF10: 0
PAINLEVEL_OUTOF10: 7
PAINLEVEL_OUTOF10: 6
PAINLEVEL_OUTOF10: 5
PAINLEVEL_OUTOF10: 6
PAINLEVEL_OUTOF10: 7
PAINLEVEL_OUTOF10: 0
PAINLEVEL_OUTOF10: 6
PAINLEVEL_OUTOF10: 6

## 2025-03-04 ASSESSMENT — PAIN DESCRIPTION - DESCRIPTORS
DESCRIPTORS: ACHING

## 2025-03-04 ASSESSMENT — PAIN DESCRIPTION - ORIENTATION
ORIENTATION: RIGHT;LOWER
ORIENTATION: RIGHT;LOWER;MID

## 2025-03-05 LAB
ANION GAP SERPL CALC-SCNC: ABNORMAL MMOL/L (ref 2–12)
BASOPHILS # BLD: 0.2 K/UL (ref 0–0.1)
BASOPHILS NFR BLD: 2 % (ref 0–1)
BUN SERPL-MCNC: 12 MG/DL (ref 6–20)
BUN/CREAT SERPL: 21 (ref 12–20)
CALCIUM SERPL-MCNC: 10.4 MG/DL (ref 8.5–10.1)
CHLORIDE SERPL-SCNC: 98 MMOL/L (ref 97–108)
CO2 SERPL-SCNC: 38 MMOL/L (ref 21–32)
CREAT SERPL-MCNC: 0.58 MG/DL (ref 0.55–1.02)
DIFFERENTIAL METHOD BLD: ABNORMAL
EOSINOPHIL # BLD: 0.2 K/UL (ref 0–0.4)
EOSINOPHIL NFR BLD: 2 % (ref 0–7)
ERYTHROCYTE [DISTWIDTH] IN BLOOD BY AUTOMATED COUNT: 14 % (ref 11.5–14.5)
GLUCOSE BLD STRIP.AUTO-MCNC: 124 MG/DL (ref 65–117)
GLUCOSE BLD STRIP.AUTO-MCNC: 160 MG/DL (ref 65–117)
GLUCOSE BLD STRIP.AUTO-MCNC: 178 MG/DL (ref 65–117)
GLUCOSE BLD STRIP.AUTO-MCNC: 181 MG/DL (ref 65–117)
GLUCOSE SERPL-MCNC: 172 MG/DL (ref 65–100)
HCT VFR BLD AUTO: 39 % (ref 35–47)
HGB BLD-MCNC: 11 G/DL (ref 11.5–16)
IMM GRANULOCYTES # BLD AUTO: 0 K/UL (ref 0–0.04)
IMM GRANULOCYTES NFR BLD AUTO: 0 % (ref 0–0.5)
LYMPHOCYTES # BLD: 0.8 K/UL (ref 0.8–3.5)
LYMPHOCYTES NFR BLD: 8 % (ref 12–49)
MCH RBC QN AUTO: 28.9 PG (ref 26–34)
MCHC RBC AUTO-ENTMCNC: 28.2 G/DL (ref 30–36.5)
MCV RBC AUTO: 102.6 FL (ref 80–99)
METAMYELOCYTES NFR BLD MANUAL: 2 %
MONOCYTES # BLD: 1 K/UL (ref 0–1)
MONOCYTES NFR BLD: 10 % (ref 5–13)
NEUTS BAND NFR BLD MANUAL: 3 %
NEUTS SEG # BLD: 7.6 K/UL (ref 1.8–8)
NEUTS SEG NFR BLD: 73 % (ref 32–75)
NRBC # BLD: 0.04 K/UL (ref 0–0.01)
NRBC BLD-RTO: 0.4 PER 100 WBC
PLATELET # BLD AUTO: 164 K/UL (ref 150–400)
PMV BLD AUTO: 9.7 FL (ref 8.9–12.9)
POTASSIUM SERPL-SCNC: 4.8 MMOL/L (ref 3.5–5.1)
RBC # BLD AUTO: 3.8 M/UL (ref 3.8–5.2)
RBC MORPH BLD: ABNORMAL
SERVICE CMNT-IMP: ABNORMAL
SODIUM SERPL-SCNC: 135 MMOL/L (ref 136–145)
WBC # BLD AUTO: 10 K/UL (ref 3.6–11)

## 2025-03-05 PROCEDURE — 2500000003 HC RX 250 WO HCPCS: Performed by: NURSE PRACTITIONER

## 2025-03-05 PROCEDURE — 6370000000 HC RX 637 (ALT 250 FOR IP): Performed by: INTERNAL MEDICINE

## 2025-03-05 PROCEDURE — 6360000002 HC RX W HCPCS: Performed by: PHYSICIAN ASSISTANT

## 2025-03-05 PROCEDURE — 80048 BASIC METABOLIC PNL TOTAL CA: CPT

## 2025-03-05 PROCEDURE — 85025 COMPLETE CBC W/AUTO DIFF WBC: CPT

## 2025-03-05 PROCEDURE — 94640 AIRWAY INHALATION TREATMENT: CPT

## 2025-03-05 PROCEDURE — 36415 COLL VENOUS BLD VENIPUNCTURE: CPT

## 2025-03-05 PROCEDURE — 82962 GLUCOSE BLOOD TEST: CPT

## 2025-03-05 PROCEDURE — 94660 CPAP INITIATION&MGMT: CPT

## 2025-03-05 PROCEDURE — 6370000000 HC RX 637 (ALT 250 FOR IP): Performed by: NURSE PRACTITIONER

## 2025-03-05 PROCEDURE — 2060000000 HC ICU INTERMEDIATE R&B

## 2025-03-05 PROCEDURE — 6370000000 HC RX 637 (ALT 250 FOR IP): Performed by: STUDENT IN AN ORGANIZED HEALTH CARE EDUCATION/TRAINING PROGRAM

## 2025-03-05 PROCEDURE — 2700000000 HC OXYGEN THERAPY PER DAY

## 2025-03-05 RX ADMIN — AMOXICILLIN 500 MG: 250 CAPSULE ORAL at 05:54

## 2025-03-05 RX ADMIN — PANTOPRAZOLE SODIUM 40 MG: 40 TABLET, DELAYED RELEASE ORAL at 05:55

## 2025-03-05 RX ADMIN — IPRATROPIUM BROMIDE AND ALBUTEROL SULFATE 1 DOSE: 2.5; .5 SOLUTION RESPIRATORY (INHALATION) at 07:39

## 2025-03-05 RX ADMIN — GUAIFENESIN 600 MG: 600 TABLET, EXTENDED RELEASE ORAL at 21:26

## 2025-03-05 RX ADMIN — IPRATROPIUM BROMIDE AND ALBUTEROL SULFATE 1 DOSE: 2.5; .5 SOLUTION RESPIRATORY (INHALATION) at 17:22

## 2025-03-05 RX ADMIN — AMOXICILLIN 500 MG: 250 CAPSULE ORAL at 15:14

## 2025-03-05 RX ADMIN — Medication 5 DROP: at 21:29

## 2025-03-05 RX ADMIN — SODIUM CHLORIDE, PRESERVATIVE FREE 10 ML: 5 INJECTION INTRAVENOUS at 21:28

## 2025-03-05 RX ADMIN — INSULIN GLARGINE 10 UNITS: 100 INJECTION, SOLUTION SUBCUTANEOUS at 10:59

## 2025-03-05 RX ADMIN — ARFORMOTEROL TARTRATE: 15 SOLUTION RESPIRATORY (INHALATION) at 07:44

## 2025-03-05 RX ADMIN — METOPROLOL TARTRATE 25 MG: 25 TABLET, FILM COATED ORAL at 21:28

## 2025-03-05 RX ADMIN — METHADONE HYDROCHLORIDE 5 MG: 10 TABLET ORAL at 10:54

## 2025-03-05 RX ADMIN — APIXABAN 5 MG: 5 TABLET, FILM COATED ORAL at 10:55

## 2025-03-05 RX ADMIN — AMOXICILLIN 500 MG: 250 CAPSULE ORAL at 21:32

## 2025-03-05 RX ADMIN — ARFORMOTEROL TARTRATE: 15 SOLUTION RESPIRATORY (INHALATION) at 19:58

## 2025-03-05 RX ADMIN — SODIUM CHLORIDE, PRESERVATIVE FREE 10 ML: 5 INJECTION INTRAVENOUS at 11:11

## 2025-03-05 RX ADMIN — APIXABAN 5 MG: 5 TABLET, FILM COATED ORAL at 21:26

## 2025-03-05 RX ADMIN — METHADONE HYDROCHLORIDE 5 MG: 10 TABLET ORAL at 21:26

## 2025-03-05 RX ADMIN — IPRATROPIUM BROMIDE AND ALBUTEROL SULFATE 1 DOSE: 2.5; .5 SOLUTION RESPIRATORY (INHALATION) at 20:02

## 2025-03-05 RX ADMIN — INSULIN LISPRO 1 UNITS: 100 INJECTION, SOLUTION INTRAVENOUS; SUBCUTANEOUS at 18:30

## 2025-03-05 RX ADMIN — CETIRIZINE HYDROCHLORIDE 10 MG: 10 TABLET, FILM COATED ORAL at 21:26

## 2025-03-05 ASSESSMENT — PAIN DESCRIPTION - LOCATION
LOCATION: GENERALIZED
LOCATION: GENERALIZED
LOCATION: GENERALIZED;BACK

## 2025-03-05 ASSESSMENT — PAIN DESCRIPTION - DESCRIPTORS
DESCRIPTORS: ACHING
DESCRIPTORS: ACHING

## 2025-03-05 ASSESSMENT — PAIN SCALES - GENERAL
PAINLEVEL_OUTOF10: 5

## 2025-03-05 ASSESSMENT — PAIN SCALES - WONG BAKER
WONGBAKER_NUMERICALRESPONSE: NO HURT
WONGBAKER_NUMERICALRESPONSE: NO HURT

## 2025-03-06 LAB
GLUCOSE BLD STRIP.AUTO-MCNC: 124 MG/DL (ref 65–117)
GLUCOSE BLD STRIP.AUTO-MCNC: 135 MG/DL (ref 65–117)
GLUCOSE BLD STRIP.AUTO-MCNC: 164 MG/DL (ref 65–117)
GLUCOSE BLD STRIP.AUTO-MCNC: 198 MG/DL (ref 65–117)
SERVICE CMNT-IMP: ABNORMAL

## 2025-03-06 PROCEDURE — 2500000003 HC RX 250 WO HCPCS: Performed by: NURSE PRACTITIONER

## 2025-03-06 PROCEDURE — 82962 GLUCOSE BLOOD TEST: CPT

## 2025-03-06 PROCEDURE — 6370000000 HC RX 637 (ALT 250 FOR IP): Performed by: INTERNAL MEDICINE

## 2025-03-06 PROCEDURE — 2060000000 HC ICU INTERMEDIATE R&B

## 2025-03-06 PROCEDURE — 6370000000 HC RX 637 (ALT 250 FOR IP): Performed by: NURSE PRACTITIONER

## 2025-03-06 PROCEDURE — 6370000000 HC RX 637 (ALT 250 FOR IP): Performed by: STUDENT IN AN ORGANIZED HEALTH CARE EDUCATION/TRAINING PROGRAM

## 2025-03-06 PROCEDURE — 94640 AIRWAY INHALATION TREATMENT: CPT

## 2025-03-06 PROCEDURE — 6360000002 HC RX W HCPCS: Performed by: PHYSICIAN ASSISTANT

## 2025-03-06 PROCEDURE — 2700000000 HC OXYGEN THERAPY PER DAY

## 2025-03-06 RX ADMIN — AMOXICILLIN 500 MG: 250 CAPSULE ORAL at 23:26

## 2025-03-06 RX ADMIN — PANTOPRAZOLE SODIUM 40 MG: 40 TABLET, DELAYED RELEASE ORAL at 05:58

## 2025-03-06 RX ADMIN — SODIUM CHLORIDE, PRESERVATIVE FREE 10 ML: 5 INJECTION INTRAVENOUS at 10:37

## 2025-03-06 RX ADMIN — IPRATROPIUM BROMIDE AND ALBUTEROL SULFATE 1 DOSE: 2.5; .5 SOLUTION RESPIRATORY (INHALATION) at 08:56

## 2025-03-06 RX ADMIN — Medication 5 DROP: at 15:00

## 2025-03-06 RX ADMIN — Medication 5 DROP: at 18:54

## 2025-03-06 RX ADMIN — INSULIN LISPRO 1 UNITS: 100 INJECTION, SOLUTION INTRAVENOUS; SUBCUTANEOUS at 08:16

## 2025-03-06 RX ADMIN — AMOXICILLIN 500 MG: 250 CAPSULE ORAL at 05:58

## 2025-03-06 RX ADMIN — INSULIN GLARGINE 10 UNITS: 100 INJECTION, SOLUTION SUBCUTANEOUS at 08:17

## 2025-03-06 RX ADMIN — IPRATROPIUM BROMIDE AND ALBUTEROL SULFATE 1 DOSE: 2.5; .5 SOLUTION RESPIRATORY (INHALATION) at 20:23

## 2025-03-06 RX ADMIN — METOPROLOL TARTRATE 25 MG: 25 TABLET, FILM COATED ORAL at 23:37

## 2025-03-06 RX ADMIN — CETIRIZINE HYDROCHLORIDE 10 MG: 10 TABLET, FILM COATED ORAL at 23:31

## 2025-03-06 RX ADMIN — IPRATROPIUM BROMIDE AND ALBUTEROL SULFATE 1 DOSE: 2.5; .5 SOLUTION RESPIRATORY (INHALATION) at 14:30

## 2025-03-06 RX ADMIN — Medication 5 DROP: at 23:30

## 2025-03-06 RX ADMIN — METHADONE HYDROCHLORIDE 5 MG: 10 TABLET ORAL at 23:26

## 2025-03-06 RX ADMIN — ARFORMOTEROL TARTRATE: 15 SOLUTION RESPIRATORY (INHALATION) at 20:22

## 2025-03-06 RX ADMIN — ARFORMOTEROL TARTRATE: 15 SOLUTION RESPIRATORY (INHALATION) at 08:56

## 2025-03-06 RX ADMIN — APIXABAN 5 MG: 5 TABLET, FILM COATED ORAL at 23:32

## 2025-03-06 RX ADMIN — GUAIFENESIN 600 MG: 600 TABLET, EXTENDED RELEASE ORAL at 23:27

## 2025-03-06 RX ADMIN — Medication: at 23:27

## 2025-03-06 RX ADMIN — SODIUM CHLORIDE, PRESERVATIVE FREE 10 ML: 5 INJECTION INTRAVENOUS at 23:29

## 2025-03-07 ENCOUNTER — APPOINTMENT (OUTPATIENT)
Facility: HOSPITAL | Age: 55
DRG: 189 | End: 2025-03-07
Payer: MEDICARE

## 2025-03-07 LAB
ANION GAP SERPL CALC-SCNC: ABNORMAL MMOL/L (ref 2–12)
BUN SERPL-MCNC: 17 MG/DL (ref 6–20)
BUN/CREAT SERPL: 29 (ref 12–20)
CALCIUM SERPL-MCNC: 10.5 MG/DL (ref 8.5–10.1)
CHLORIDE SERPL-SCNC: 99 MMOL/L (ref 97–108)
CO2 SERPL-SCNC: 40 MMOL/L (ref 21–32)
CREAT SERPL-MCNC: 0.59 MG/DL (ref 0.55–1.02)
ERYTHROCYTE [DISTWIDTH] IN BLOOD BY AUTOMATED COUNT: 14 % (ref 11.5–14.5)
GLUCOSE BLD STRIP.AUTO-MCNC: 136 MG/DL (ref 65–117)
GLUCOSE BLD STRIP.AUTO-MCNC: 159 MG/DL (ref 65–117)
GLUCOSE BLD STRIP.AUTO-MCNC: 161 MG/DL (ref 65–117)
GLUCOSE BLD STRIP.AUTO-MCNC: 194 MG/DL (ref 65–117)
GLUCOSE SERPL-MCNC: 164 MG/DL (ref 65–100)
HCT VFR BLD AUTO: 40.5 % (ref 35–47)
HGB BLD-MCNC: 11.2 G/DL (ref 11.5–16)
MAGNESIUM SERPL-MCNC: 1.8 MG/DL (ref 1.6–2.4)
MCH RBC QN AUTO: 28.6 PG (ref 26–34)
MCHC RBC AUTO-ENTMCNC: 27.7 G/DL (ref 30–36.5)
MCV RBC AUTO: 103.6 FL (ref 80–99)
NRBC # BLD: 0.02 K/UL (ref 0–0.01)
NRBC BLD-RTO: 0.2 PER 100 WBC
PHOSPHATE SERPL-MCNC: 2 MG/DL (ref 2.6–4.7)
PLATELET # BLD AUTO: 222 K/UL (ref 150–400)
PMV BLD AUTO: 10.5 FL (ref 8.9–12.9)
POTASSIUM SERPL-SCNC: 5.2 MMOL/L (ref 3.5–5.1)
RBC # BLD AUTO: 3.91 M/UL (ref 3.8–5.2)
SERVICE CMNT-IMP: ABNORMAL
SODIUM SERPL-SCNC: 137 MMOL/L (ref 136–145)
WBC # BLD AUTO: 13.3 K/UL (ref 3.6–11)

## 2025-03-07 PROCEDURE — 6370000000 HC RX 637 (ALT 250 FOR IP): Performed by: INTERNAL MEDICINE

## 2025-03-07 PROCEDURE — 80048 BASIC METABOLIC PNL TOTAL CA: CPT

## 2025-03-07 PROCEDURE — 84100 ASSAY OF PHOSPHORUS: CPT

## 2025-03-07 PROCEDURE — 6370000000 HC RX 637 (ALT 250 FOR IP): Performed by: NURSE PRACTITIONER

## 2025-03-07 PROCEDURE — 2700000000 HC OXYGEN THERAPY PER DAY

## 2025-03-07 PROCEDURE — 94660 CPAP INITIATION&MGMT: CPT

## 2025-03-07 PROCEDURE — 6360000002 HC RX W HCPCS: Performed by: PHYSICIAN ASSISTANT

## 2025-03-07 PROCEDURE — 82962 GLUCOSE BLOOD TEST: CPT

## 2025-03-07 PROCEDURE — 83735 ASSAY OF MAGNESIUM: CPT

## 2025-03-07 PROCEDURE — 94640 AIRWAY INHALATION TREATMENT: CPT

## 2025-03-07 PROCEDURE — 85027 COMPLETE CBC AUTOMATED: CPT

## 2025-03-07 PROCEDURE — 6370000000 HC RX 637 (ALT 250 FOR IP): Performed by: STUDENT IN AN ORGANIZED HEALTH CARE EDUCATION/TRAINING PROGRAM

## 2025-03-07 PROCEDURE — 2500000003 HC RX 250 WO HCPCS: Performed by: NURSE PRACTITIONER

## 2025-03-07 PROCEDURE — 71045 X-RAY EXAM CHEST 1 VIEW: CPT

## 2025-03-07 PROCEDURE — 36415 COLL VENOUS BLD VENIPUNCTURE: CPT

## 2025-03-07 PROCEDURE — 2060000000 HC ICU INTERMEDIATE R&B

## 2025-03-07 RX ADMIN — INSULIN LISPRO 1 UNITS: 100 INJECTION, SOLUTION INTRAVENOUS; SUBCUTANEOUS at 23:47

## 2025-03-07 RX ADMIN — APIXABAN 5 MG: 5 TABLET, FILM COATED ORAL at 23:49

## 2025-03-07 RX ADMIN — IPRATROPIUM BROMIDE AND ALBUTEROL SULFATE 1 DOSE: 2.5; .5 SOLUTION RESPIRATORY (INHALATION) at 09:42

## 2025-03-07 RX ADMIN — METOPROLOL TARTRATE 25 MG: 25 TABLET, FILM COATED ORAL at 23:49

## 2025-03-07 RX ADMIN — METOPROLOL TARTRATE 25 MG: 25 TABLET, FILM COATED ORAL at 09:27

## 2025-03-07 RX ADMIN — APIXABAN 5 MG: 5 TABLET, FILM COATED ORAL at 09:27

## 2025-03-07 RX ADMIN — IPRATROPIUM BROMIDE AND ALBUTEROL SULFATE 1 DOSE: 2.5; .5 SOLUTION RESPIRATORY (INHALATION) at 14:15

## 2025-03-07 RX ADMIN — Medication 5 DROP: at 13:00

## 2025-03-07 RX ADMIN — GUAIFENESIN 600 MG: 600 TABLET, EXTENDED RELEASE ORAL at 23:48

## 2025-03-07 RX ADMIN — POTASSIUM & SODIUM PHOSPHATES POWDER PACK 280-160-250 MG 250 MG: 280-160-250 PACK at 13:01

## 2025-03-07 RX ADMIN — Medication: at 09:29

## 2025-03-07 RX ADMIN — Medication 5 DROP: at 17:46

## 2025-03-07 RX ADMIN — GUAIFENESIN 600 MG: 600 TABLET, EXTENDED RELEASE ORAL at 09:26

## 2025-03-07 RX ADMIN — Medication 1 CAPSULE: at 09:26

## 2025-03-07 RX ADMIN — AMOXICILLIN 500 MG: 250 CAPSULE ORAL at 13:01

## 2025-03-07 RX ADMIN — METHADONE HYDROCHLORIDE 5 MG: 10 TABLET ORAL at 09:26

## 2025-03-07 RX ADMIN — PANTOPRAZOLE SODIUM 40 MG: 40 TABLET, DELAYED RELEASE ORAL at 09:33

## 2025-03-07 RX ADMIN — POTASSIUM & SODIUM PHOSPHATES POWDER PACK 280-160-250 MG 250 MG: 280-160-250 PACK at 23:49

## 2025-03-07 RX ADMIN — IPRATROPIUM BROMIDE AND ALBUTEROL SULFATE 1 DOSE: 2.5; .5 SOLUTION RESPIRATORY (INHALATION) at 21:00

## 2025-03-07 RX ADMIN — CETIRIZINE HYDROCHLORIDE 10 MG: 10 TABLET, FILM COATED ORAL at 23:49

## 2025-03-07 RX ADMIN — AMOXICILLIN 500 MG: 250 CAPSULE ORAL at 06:13

## 2025-03-07 RX ADMIN — ARFORMOTEROL TARTRATE: 15 SOLUTION RESPIRATORY (INHALATION) at 21:00

## 2025-03-07 RX ADMIN — Medication 5 DROP: at 09:28

## 2025-03-07 RX ADMIN — SODIUM CHLORIDE, PRESERVATIVE FREE 10 ML: 5 INJECTION INTRAVENOUS at 09:27

## 2025-03-07 RX ADMIN — INSULIN GLARGINE 10 UNITS: 100 INJECTION, SOLUTION SUBCUTANEOUS at 09:27

## 2025-03-07 RX ADMIN — ARFORMOTEROL TARTRATE: 15 SOLUTION RESPIRATORY (INHALATION) at 09:42

## 2025-03-07 RX ADMIN — Medication: at 23:47

## 2025-03-07 RX ADMIN — POTASSIUM & SODIUM PHOSPHATES POWDER PACK 280-160-250 MG 250 MG: 280-160-250 PACK at 17:45

## 2025-03-08 ENCOUNTER — APPOINTMENT (OUTPATIENT)
Facility: HOSPITAL | Age: 55
DRG: 189 | End: 2025-03-08
Payer: MEDICARE

## 2025-03-08 LAB
ARTERIAL PATENCY WRIST A: YES
BASE EXCESS BLDA CALC-SCNC: 12.8 MMOL/L
BDY SITE: ABNORMAL
GAS FLOW.O2 O2 DELIVERY SYS: 3 L/MIN
GLUCOSE BLD STRIP.AUTO-MCNC: 113 MG/DL (ref 65–117)
GLUCOSE BLD STRIP.AUTO-MCNC: 122 MG/DL (ref 65–117)
GLUCOSE BLD STRIP.AUTO-MCNC: 154 MG/DL (ref 65–117)
GLUCOSE BLD STRIP.AUTO-MCNC: 164 MG/DL (ref 65–117)
HCO3 BLDA-SCNC: 43 MMOL/L (ref 22–26)
PCO2 BLDA: 81 MMHG (ref 35–45)
PH BLDA: 7.34 (ref 7.35–7.45)
PO2 BLDA: 56 MMHG (ref 80–100)
SAO2 % BLD: 86 % (ref 92–97)
SAO2% DEVICE SAO2% SENSOR NAME: ABNORMAL
SERVICE CMNT-IMP: ABNORMAL
SERVICE CMNT-IMP: NORMAL
SPECIMEN SITE: ABNORMAL

## 2025-03-08 PROCEDURE — 82803 BLOOD GASES ANY COMBINATION: CPT

## 2025-03-08 PROCEDURE — 2580000003 HC RX 258: Performed by: HOSPITALIST

## 2025-03-08 PROCEDURE — 94640 AIRWAY INHALATION TREATMENT: CPT

## 2025-03-08 PROCEDURE — 2500000003 HC RX 250 WO HCPCS: Performed by: NURSE PRACTITIONER

## 2025-03-08 PROCEDURE — 6360000002 HC RX W HCPCS: Performed by: PHYSICIAN ASSISTANT

## 2025-03-08 PROCEDURE — 6370000000 HC RX 637 (ALT 250 FOR IP): Performed by: NURSE PRACTITIONER

## 2025-03-08 PROCEDURE — 6370000000 HC RX 637 (ALT 250 FOR IP): Performed by: STUDENT IN AN ORGANIZED HEALTH CARE EDUCATION/TRAINING PROGRAM

## 2025-03-08 PROCEDURE — 2500000003 HC RX 250 WO HCPCS: Performed by: INTERNAL MEDICINE

## 2025-03-08 PROCEDURE — 6360000002 HC RX W HCPCS: Performed by: HOSPITALIST

## 2025-03-08 PROCEDURE — 82962 GLUCOSE BLOOD TEST: CPT

## 2025-03-08 PROCEDURE — 2700000000 HC OXYGEN THERAPY PER DAY

## 2025-03-08 PROCEDURE — 71045 X-RAY EXAM CHEST 1 VIEW: CPT

## 2025-03-08 PROCEDURE — 94660 CPAP INITIATION&MGMT: CPT

## 2025-03-08 PROCEDURE — 6370000000 HC RX 637 (ALT 250 FOR IP): Performed by: INTERNAL MEDICINE

## 2025-03-08 PROCEDURE — 36600 WITHDRAWAL OF ARTERIAL BLOOD: CPT

## 2025-03-08 PROCEDURE — 2000000000 HC ICU R&B

## 2025-03-08 PROCEDURE — 6370000000 HC RX 637 (ALT 250 FOR IP): Performed by: HOSPITALIST

## 2025-03-08 RX ORDER — IPRATROPIUM BROMIDE AND ALBUTEROL SULFATE 2.5; .5 MG/3ML; MG/3ML
1 SOLUTION RESPIRATORY (INHALATION) EVERY 6 HOURS
Status: DISCONTINUED | OUTPATIENT
Start: 2025-03-08 | End: 2025-03-11

## 2025-03-08 RX ORDER — ACETYLCYSTEINE 200 MG/ML
600 SOLUTION ORAL; RESPIRATORY (INHALATION) EVERY 6 HOURS
Status: DISCONTINUED | OUTPATIENT
Start: 2025-03-08 | End: 2025-03-09

## 2025-03-08 RX ORDER — DEXMEDETOMIDINE HYDROCHLORIDE 4 UG/ML
.1-1.5 INJECTION, SOLUTION INTRAVENOUS CONTINUOUS
Status: DISCONTINUED | OUTPATIENT
Start: 2025-03-08 | End: 2025-03-13

## 2025-03-08 RX ORDER — SODIUM CHLORIDE FOR INHALATION 3 %
4 VIAL, NEBULIZER (ML) INHALATION EVERY 6 HOURS
Status: DISCONTINUED | OUTPATIENT
Start: 2025-03-08 | End: 2025-03-09

## 2025-03-08 RX ADMIN — ACETYLCYSTEINE 600 MG: 200 INHALANT RESPIRATORY (INHALATION) at 14:14

## 2025-03-08 RX ADMIN — Medication: at 21:16

## 2025-03-08 RX ADMIN — ACETYLCYSTEINE 600 MG: 200 INHALANT RESPIRATORY (INHALATION) at 21:02

## 2025-03-08 RX ADMIN — Medication 4 ML: at 19:00

## 2025-03-08 RX ADMIN — METOPROLOL TARTRATE 25 MG: 25 TABLET, FILM COATED ORAL at 09:01

## 2025-03-08 RX ADMIN — INSULIN GLARGINE 10 UNITS: 100 INJECTION, SOLUTION SUBCUTANEOUS at 09:00

## 2025-03-08 RX ADMIN — Medication 1 AMPULE: at 21:17

## 2025-03-08 RX ADMIN — GUAIFENESIN 600 MG: 600 TABLET, EXTENDED RELEASE ORAL at 21:17

## 2025-03-08 RX ADMIN — ARFORMOTEROL TARTRATE: 15 SOLUTION RESPIRATORY (INHALATION) at 21:07

## 2025-03-08 RX ADMIN — POTASSIUM & SODIUM PHOSPHATES POWDER PACK 280-160-250 MG 250 MG: 280-160-250 PACK at 21:16

## 2025-03-08 RX ADMIN — SODIUM CHLORIDE, PRESERVATIVE FREE 10 ML: 5 INJECTION INTRAVENOUS at 09:01

## 2025-03-08 RX ADMIN — APIXABAN 5 MG: 5 TABLET, FILM COATED ORAL at 09:01

## 2025-03-08 RX ADMIN — METHADONE HYDROCHLORIDE 5 MG: 10 TABLET ORAL at 09:01

## 2025-03-08 RX ADMIN — Medication 4 ML: at 21:02

## 2025-03-08 RX ADMIN — PANTOPRAZOLE SODIUM 40 MG: 40 TABLET, DELAYED RELEASE ORAL at 09:01

## 2025-03-08 RX ADMIN — IPRATROPIUM BROMIDE AND ALBUTEROL SULFATE 1 DOSE: 2.5; .5 SOLUTION RESPIRATORY (INHALATION) at 21:02

## 2025-03-08 RX ADMIN — IPRATROPIUM BROMIDE AND ALBUTEROL SULFATE 1 DOSE: 2.5; .5 SOLUTION RESPIRATORY (INHALATION) at 07:37

## 2025-03-08 RX ADMIN — GUAIFENESIN 600 MG: 600 TABLET, EXTENDED RELEASE ORAL at 09:01

## 2025-03-08 RX ADMIN — DEXMEDETOMIDINE HYDROCHLORIDE 0.2 MCG/KG/HR: 400 INJECTION, SOLUTION INTRAVENOUS at 19:10

## 2025-03-08 RX ADMIN — SODIUM CHLORIDE, PRESERVATIVE FREE 20 ML: 5 INJECTION INTRAVENOUS at 22:24

## 2025-03-08 RX ADMIN — Medication 1 CAPSULE: at 09:00

## 2025-03-08 RX ADMIN — ARFORMOTEROL TARTRATE: 15 SOLUTION RESPIRATORY (INHALATION) at 07:39

## 2025-03-08 RX ADMIN — METHADONE HYDROCHLORIDE 5 MG: 10 TABLET ORAL at 00:01

## 2025-03-08 RX ADMIN — SODIUM CHLORIDE, PRESERVATIVE FREE 10 ML: 5 INJECTION INTRAVENOUS at 02:06

## 2025-03-08 RX ADMIN — METOPROLOL TARTRATE 25 MG: 25 TABLET, FILM COATED ORAL at 21:15

## 2025-03-08 RX ADMIN — IPRATROPIUM BROMIDE AND ALBUTEROL SULFATE 1 DOSE: 2.5; .5 SOLUTION RESPIRATORY (INHALATION) at 14:13

## 2025-03-08 RX ADMIN — HYDROMORPHONE HYDROCHLORIDE 2 MG: 2 TABLET ORAL at 21:26

## 2025-03-08 RX ADMIN — CETIRIZINE HYDROCHLORIDE 10 MG: 10 TABLET, FILM COATED ORAL at 21:16

## 2025-03-08 RX ADMIN — POTASSIUM & SODIUM PHOSPHATES POWDER PACK 280-160-250 MG 250 MG: 280-160-250 PACK at 09:01

## 2025-03-08 RX ADMIN — METHADONE HYDROCHLORIDE 5 MG: 10 TABLET ORAL at 21:16

## 2025-03-08 RX ADMIN — APIXABAN 5 MG: 5 TABLET, FILM COATED ORAL at 21:16

## 2025-03-08 RX ADMIN — Medication: at 09:05

## 2025-03-08 ASSESSMENT — PAIN DESCRIPTION - LOCATION: LOCATION: BUTTOCKS;COCCYX

## 2025-03-08 ASSESSMENT — PAIN DESCRIPTION - ORIENTATION: ORIENTATION: POSTERIOR

## 2025-03-08 ASSESSMENT — PAIN SCALES - WONG BAKER: WONGBAKER_NUMERICALRESPONSE: NO HURT

## 2025-03-08 ASSESSMENT — PAIN DESCRIPTION - DESCRIPTORS: DESCRIPTORS: DISCOMFORT

## 2025-03-08 ASSESSMENT — PAIN SCALES - GENERAL
PAINLEVEL_OUTOF10: 10
PAINLEVEL_OUTOF10: 0

## 2025-03-09 ENCOUNTER — APPOINTMENT (OUTPATIENT)
Facility: HOSPITAL | Age: 55
DRG: 189 | End: 2025-03-09
Payer: MEDICARE

## 2025-03-09 LAB
ANION GAP SERPL CALC-SCNC: 2 MMOL/L (ref 2–12)
ARTERIAL PATENCY WRIST A: YES
BASE EXCESS BLDA CALC-SCNC: 12.1 MMOL/L
BDY SITE: ABNORMAL
BUN SERPL-MCNC: 21 MG/DL (ref 6–20)
BUN/CREAT SERPL: 35 (ref 12–20)
CALCIUM SERPL-MCNC: 10.5 MG/DL (ref 8.5–10.1)
CHLORIDE SERPL-SCNC: 98 MMOL/L (ref 97–108)
CO2 SERPL-SCNC: 40 MMOL/L (ref 21–32)
CREAT SERPL-MCNC: 0.6 MG/DL (ref 0.55–1.02)
ERYTHROCYTE [DISTWIDTH] IN BLOOD BY AUTOMATED COUNT: 13.9 % (ref 11.5–14.5)
GAS FLOW.O2 O2 DELIVERY SYS: 6 L/MIN
GLUCOSE BLD STRIP.AUTO-MCNC: 222 MG/DL (ref 65–117)
GLUCOSE BLD STRIP.AUTO-MCNC: 260 MG/DL (ref 65–117)
GLUCOSE BLD STRIP.AUTO-MCNC: 260 MG/DL (ref 65–117)
GLUCOSE SERPL-MCNC: 163 MG/DL (ref 65–100)
HCO3 BLDA-SCNC: 40 MMOL/L (ref 22–26)
HCT VFR BLD AUTO: 38.5 % (ref 35–47)
HGB BLD-MCNC: 11.1 G/DL (ref 11.5–16)
MAGNESIUM SERPL-MCNC: 1.8 MG/DL (ref 1.6–2.4)
MCH RBC QN AUTO: 28.4 PG (ref 26–34)
MCHC RBC AUTO-ENTMCNC: 28.8 G/DL (ref 30–36.5)
MCV RBC AUTO: 98.5 FL (ref 80–99)
NRBC # BLD: 0.02 K/UL (ref 0–0.01)
NRBC BLD-RTO: 0.2 PER 100 WBC
PCO2 BLDA: 66 MMHG (ref 35–45)
PH BLDA: 7.4 (ref 7.35–7.45)
PHOSPHATE SERPL-MCNC: 2.6 MG/DL (ref 2.6–4.7)
PLATELET # BLD AUTO: 225 K/UL (ref 150–400)
PMV BLD AUTO: 10.3 FL (ref 8.9–12.9)
PO2 BLDA: 56 MMHG (ref 80–100)
POTASSIUM SERPL-SCNC: 4.8 MMOL/L (ref 3.5–5.1)
RBC # BLD AUTO: 3.91 M/UL (ref 3.8–5.2)
SAO2 % BLD: 88 % (ref 92–97)
SAO2% DEVICE SAO2% SENSOR NAME: ABNORMAL
SERVICE CMNT-IMP: ABNORMAL
SODIUM SERPL-SCNC: 140 MMOL/L (ref 136–145)
SPECIMEN SITE: ABNORMAL
WBC # BLD AUTO: 9 K/UL (ref 3.6–11)

## 2025-03-09 PROCEDURE — 6370000000 HC RX 637 (ALT 250 FOR IP): Performed by: STUDENT IN AN ORGANIZED HEALTH CARE EDUCATION/TRAINING PROGRAM

## 2025-03-09 PROCEDURE — 6370000000 HC RX 637 (ALT 250 FOR IP): Performed by: INTERNAL MEDICINE

## 2025-03-09 PROCEDURE — 6360000002 HC RX W HCPCS: Performed by: HOSPITALIST

## 2025-03-09 PROCEDURE — 2500000003 HC RX 250 WO HCPCS: Performed by: INTERNAL MEDICINE

## 2025-03-09 PROCEDURE — 84100 ASSAY OF PHOSPHORUS: CPT

## 2025-03-09 PROCEDURE — 94640 AIRWAY INHALATION TREATMENT: CPT

## 2025-03-09 PROCEDURE — 36600 WITHDRAWAL OF ARTERIAL BLOOD: CPT

## 2025-03-09 PROCEDURE — 85027 COMPLETE CBC AUTOMATED: CPT

## 2025-03-09 PROCEDURE — 83735 ASSAY OF MAGNESIUM: CPT

## 2025-03-09 PROCEDURE — 82803 BLOOD GASES ANY COMBINATION: CPT

## 2025-03-09 PROCEDURE — 36415 COLL VENOUS BLD VENIPUNCTURE: CPT

## 2025-03-09 PROCEDURE — 2500000003 HC RX 250 WO HCPCS: Performed by: NURSE PRACTITIONER

## 2025-03-09 PROCEDURE — 6370000000 HC RX 637 (ALT 250 FOR IP): Performed by: NURSE PRACTITIONER

## 2025-03-09 PROCEDURE — 94660 CPAP INITIATION&MGMT: CPT

## 2025-03-09 PROCEDURE — 71045 X-RAY EXAM CHEST 1 VIEW: CPT

## 2025-03-09 PROCEDURE — 2700000000 HC OXYGEN THERAPY PER DAY

## 2025-03-09 PROCEDURE — 80048 BASIC METABOLIC PNL TOTAL CA: CPT

## 2025-03-09 PROCEDURE — 2580000003 HC RX 258: Performed by: HOSPITALIST

## 2025-03-09 PROCEDURE — 82962 GLUCOSE BLOOD TEST: CPT

## 2025-03-09 PROCEDURE — 6370000000 HC RX 637 (ALT 250 FOR IP): Performed by: HOSPITALIST

## 2025-03-09 PROCEDURE — 94667 MNPJ CHEST WALL 1ST: CPT

## 2025-03-09 PROCEDURE — 6360000002 HC RX W HCPCS: Performed by: PHYSICIAN ASSISTANT

## 2025-03-09 PROCEDURE — 2000000000 HC ICU R&B

## 2025-03-09 RX ORDER — ACETYLCYSTEINE 200 MG/ML
600 SOLUTION ORAL; RESPIRATORY (INHALATION)
Status: DISCONTINUED | OUTPATIENT
Start: 2025-03-09 | End: 2025-03-15

## 2025-03-09 RX ORDER — SODIUM CHLORIDE FOR INHALATION 3 %
4 VIAL, NEBULIZER (ML) INHALATION
Status: DISCONTINUED | OUTPATIENT
Start: 2025-03-09 | End: 2025-03-15

## 2025-03-09 RX ORDER — FUROSEMIDE 10 MG/ML
40 INJECTION INTRAMUSCULAR; INTRAVENOUS ONCE
Status: COMPLETED | OUTPATIENT
Start: 2025-03-09 | End: 2025-03-09

## 2025-03-09 RX ADMIN — METHADONE HYDROCHLORIDE 5 MG: 10 TABLET ORAL at 21:54

## 2025-03-09 RX ADMIN — PANTOPRAZOLE SODIUM 40 MG: 40 TABLET, DELAYED RELEASE ORAL at 09:51

## 2025-03-09 RX ADMIN — Medication 4 ML: at 14:05

## 2025-03-09 RX ADMIN — INSULIN GLARGINE 10 UNITS: 100 INJECTION, SOLUTION SUBCUTANEOUS at 09:48

## 2025-03-09 RX ADMIN — DEXMEDETOMIDINE HYDROCHLORIDE 0.4 MCG/KG/HR: 400 INJECTION, SOLUTION INTRAVENOUS at 00:07

## 2025-03-09 RX ADMIN — INSULIN LISPRO 2 UNITS: 100 INJECTION, SOLUTION INTRAVENOUS; SUBCUTANEOUS at 17:35

## 2025-03-09 RX ADMIN — ACETYLCYSTEINE 600 MG: 200 INHALANT RESPIRATORY (INHALATION) at 14:05

## 2025-03-09 RX ADMIN — IPRATROPIUM BROMIDE AND ALBUTEROL SULFATE 1 DOSE: 2.5; .5 SOLUTION RESPIRATORY (INHALATION) at 08:11

## 2025-03-09 RX ADMIN — ARFORMOTEROL TARTRATE: 15 SOLUTION RESPIRATORY (INHALATION) at 08:19

## 2025-03-09 RX ADMIN — IPRATROPIUM BROMIDE AND ALBUTEROL SULFATE 1 DOSE: 2.5; .5 SOLUTION RESPIRATORY (INHALATION) at 20:20

## 2025-03-09 RX ADMIN — INSULIN LISPRO 2 UNITS: 100 INJECTION, SOLUTION INTRAVENOUS; SUBCUTANEOUS at 20:31

## 2025-03-09 RX ADMIN — DEXMEDETOMIDINE HYDROCHLORIDE 0.7 MCG/KG/HR: 400 INJECTION, SOLUTION INTRAVENOUS at 16:37

## 2025-03-09 RX ADMIN — IPRATROPIUM BROMIDE AND ALBUTEROL SULFATE 1 DOSE: 2.5; .5 SOLUTION RESPIRATORY (INHALATION) at 01:34

## 2025-03-09 RX ADMIN — DEXMEDETOMIDINE HYDROCHLORIDE 0.6 MCG/KG/HR: 400 INJECTION, SOLUTION INTRAVENOUS at 08:13

## 2025-03-09 RX ADMIN — Medication: at 20:33

## 2025-03-09 RX ADMIN — ACETYLCYSTEINE 600 MG: 200 INHALANT RESPIRATORY (INHALATION) at 20:20

## 2025-03-09 RX ADMIN — Medication 4 ML: at 20:20

## 2025-03-09 RX ADMIN — SODIUM CHLORIDE, PRESERVATIVE FREE 10 ML: 5 INJECTION INTRAVENOUS at 20:37

## 2025-03-09 RX ADMIN — ACETYLCYSTEINE 600 MG: 200 INHALANT RESPIRATORY (INHALATION) at 01:34

## 2025-03-09 RX ADMIN — Medication: at 09:00

## 2025-03-09 RX ADMIN — Medication 4 ML: at 08:19

## 2025-03-09 RX ADMIN — DEXMEDETOMIDINE HYDROCHLORIDE 0.6 MCG/KG/HR: 400 INJECTION, SOLUTION INTRAVENOUS at 12:34

## 2025-03-09 RX ADMIN — ACETYLCYSTEINE 600 MG: 200 INHALANT RESPIRATORY (INHALATION) at 08:11

## 2025-03-09 RX ADMIN — APIXABAN 5 MG: 5 TABLET, FILM COATED ORAL at 21:54

## 2025-03-09 RX ADMIN — ARFORMOTEROL TARTRATE: 15 SOLUTION RESPIRATORY (INHALATION) at 20:25

## 2025-03-09 RX ADMIN — DEXMEDETOMIDINE HYDROCHLORIDE 0.6 MCG/KG/HR: 400 INJECTION, SOLUTION INTRAVENOUS at 20:52

## 2025-03-09 RX ADMIN — SODIUM CHLORIDE, PRESERVATIVE FREE 10 ML: 5 INJECTION INTRAVENOUS at 09:00

## 2025-03-09 RX ADMIN — METOPROLOL TARTRATE 25 MG: 25 TABLET, FILM COATED ORAL at 09:50

## 2025-03-09 RX ADMIN — METOPROLOL TARTRATE 25 MG: 25 TABLET, FILM COATED ORAL at 22:04

## 2025-03-09 RX ADMIN — Medication 4 ML: at 01:34

## 2025-03-09 RX ADMIN — CETIRIZINE HYDROCHLORIDE 10 MG: 10 TABLET, FILM COATED ORAL at 21:55

## 2025-03-09 RX ADMIN — FUROSEMIDE 40 MG: 10 INJECTION, SOLUTION INTRAMUSCULAR; INTRAVENOUS at 14:36

## 2025-03-09 RX ADMIN — IPRATROPIUM BROMIDE AND ALBUTEROL SULFATE 1 DOSE: 2.5; .5 SOLUTION RESPIRATORY (INHALATION) at 14:05

## 2025-03-09 RX ADMIN — Medication 1 AMPULE: at 22:06

## 2025-03-10 ENCOUNTER — APPOINTMENT (OUTPATIENT)
Facility: HOSPITAL | Age: 55
DRG: 189 | End: 2025-03-10
Payer: MEDICARE

## 2025-03-10 LAB
ANION GAP SERPL CALC-SCNC: 4 MMOL/L (ref 2–12)
BUN SERPL-MCNC: 23 MG/DL (ref 6–20)
BUN/CREAT SERPL: 27 (ref 12–20)
CALCIUM SERPL-MCNC: 10.8 MG/DL (ref 8.5–10.1)
CHLORIDE SERPL-SCNC: 98 MMOL/L (ref 97–108)
CO2 SERPL-SCNC: 37 MMOL/L (ref 21–32)
CREAT SERPL-MCNC: 0.85 MG/DL (ref 0.55–1.02)
ERYTHROCYTE [DISTWIDTH] IN BLOOD BY AUTOMATED COUNT: 14.3 % (ref 11.5–14.5)
GLUCOSE BLD STRIP.AUTO-MCNC: 140 MG/DL (ref 65–117)
GLUCOSE BLD STRIP.AUTO-MCNC: 231 MG/DL (ref 65–117)
GLUCOSE BLD STRIP.AUTO-MCNC: 236 MG/DL (ref 65–117)
GLUCOSE BLD STRIP.AUTO-MCNC: 236 MG/DL (ref 65–117)
GLUCOSE SERPL-MCNC: 165 MG/DL (ref 65–100)
HCT VFR BLD AUTO: 41.3 % (ref 35–47)
HGB BLD-MCNC: 11.8 G/DL (ref 11.5–16)
MAGNESIUM SERPL-MCNC: 1.9 MG/DL (ref 1.6–2.4)
MCH RBC QN AUTO: 28.3 PG (ref 26–34)
MCHC RBC AUTO-ENTMCNC: 28.6 G/DL (ref 30–36.5)
MCV RBC AUTO: 99 FL (ref 80–99)
NRBC # BLD: 0.05 K/UL (ref 0–0.01)
NRBC BLD-RTO: 0.5 PER 100 WBC
PHOSPHATE SERPL-MCNC: 3.3 MG/DL (ref 2.6–4.7)
PLATELET # BLD AUTO: 267 K/UL (ref 150–400)
PMV BLD AUTO: 9.8 FL (ref 8.9–12.9)
POTASSIUM SERPL-SCNC: 4.1 MMOL/L (ref 3.5–5.1)
RBC # BLD AUTO: 4.17 M/UL (ref 3.8–5.2)
SERVICE CMNT-IMP: ABNORMAL
SODIUM SERPL-SCNC: 139 MMOL/L (ref 136–145)
WBC # BLD AUTO: 10.1 K/UL (ref 3.6–11)

## 2025-03-10 PROCEDURE — 2580000003 HC RX 258: Performed by: HOSPITALIST

## 2025-03-10 PROCEDURE — 84100 ASSAY OF PHOSPHORUS: CPT

## 2025-03-10 PROCEDURE — 80048 BASIC METABOLIC PNL TOTAL CA: CPT

## 2025-03-10 PROCEDURE — 6370000000 HC RX 637 (ALT 250 FOR IP): Performed by: NURSE PRACTITIONER

## 2025-03-10 PROCEDURE — 6370000000 HC RX 637 (ALT 250 FOR IP): Performed by: INTERNAL MEDICINE

## 2025-03-10 PROCEDURE — 2000000000 HC ICU R&B

## 2025-03-10 PROCEDURE — 94668 MNPJ CHEST WALL SBSQ: CPT

## 2025-03-10 PROCEDURE — 36415 COLL VENOUS BLD VENIPUNCTURE: CPT

## 2025-03-10 PROCEDURE — 94640 AIRWAY INHALATION TREATMENT: CPT

## 2025-03-10 PROCEDURE — 82962 GLUCOSE BLOOD TEST: CPT

## 2025-03-10 PROCEDURE — 2500000003 HC RX 250 WO HCPCS: Performed by: NURSE PRACTITIONER

## 2025-03-10 PROCEDURE — 83735 ASSAY OF MAGNESIUM: CPT

## 2025-03-10 PROCEDURE — 2500000003 HC RX 250 WO HCPCS: Performed by: INTERNAL MEDICINE

## 2025-03-10 PROCEDURE — 2580000003 HC RX 258: Performed by: NURSE PRACTITIONER

## 2025-03-10 PROCEDURE — 6360000002 HC RX W HCPCS: Performed by: HOSPITALIST

## 2025-03-10 PROCEDURE — 6360000002 HC RX W HCPCS: Performed by: PHYSICIAN ASSISTANT

## 2025-03-10 PROCEDURE — 6370000000 HC RX 637 (ALT 250 FOR IP): Performed by: STUDENT IN AN ORGANIZED HEALTH CARE EDUCATION/TRAINING PROGRAM

## 2025-03-10 PROCEDURE — 71045 X-RAY EXAM CHEST 1 VIEW: CPT

## 2025-03-10 PROCEDURE — 6370000000 HC RX 637 (ALT 250 FOR IP): Performed by: HOSPITALIST

## 2025-03-10 PROCEDURE — 85027 COMPLETE CBC AUTOMATED: CPT

## 2025-03-10 PROCEDURE — 94660 CPAP INITIATION&MGMT: CPT

## 2025-03-10 RX ORDER — 0.9 % SODIUM CHLORIDE 0.9 %
500 INTRAVENOUS SOLUTION INTRAVENOUS ONCE
Status: COMPLETED | OUTPATIENT
Start: 2025-03-10 | End: 2025-03-10

## 2025-03-10 RX ORDER — ANASTROZOLE 1 MG/1
1 TABLET ORAL DAILY
Status: DISCONTINUED | OUTPATIENT
Start: 2025-03-10 | End: 2025-03-20 | Stop reason: HOSPADM

## 2025-03-10 RX ADMIN — Medication 4 ML: at 08:10

## 2025-03-10 RX ADMIN — ACETYLCYSTEINE 600 MG: 200 INHALANT RESPIRATORY (INHALATION) at 19:18

## 2025-03-10 RX ADMIN — INSULIN LISPRO 1 UNITS: 100 INJECTION, SOLUTION INTRAVENOUS; SUBCUTANEOUS at 20:38

## 2025-03-10 RX ADMIN — Medication 4 ML: at 13:36

## 2025-03-10 RX ADMIN — DEXMEDETOMIDINE HYDROCHLORIDE 0.3 MCG/KG/HR: 400 INJECTION, SOLUTION INTRAVENOUS at 08:32

## 2025-03-10 RX ADMIN — DEXMEDETOMIDINE HYDROCHLORIDE 0.3 MCG/KG/HR: 400 INJECTION, SOLUTION INTRAVENOUS at 18:40

## 2025-03-10 RX ADMIN — SODIUM CHLORIDE, PRESERVATIVE FREE 10 ML: 5 INJECTION INTRAVENOUS at 20:35

## 2025-03-10 RX ADMIN — Medication 4 ML: at 19:19

## 2025-03-10 RX ADMIN — IPRATROPIUM BROMIDE AND ALBUTEROL SULFATE 1 DOSE: 2.5; .5 SOLUTION RESPIRATORY (INHALATION) at 19:19

## 2025-03-10 RX ADMIN — IPRATROPIUM BROMIDE AND ALBUTEROL SULFATE 1 DOSE: 2.5; .5 SOLUTION RESPIRATORY (INHALATION) at 02:59

## 2025-03-10 RX ADMIN — Medication: at 20:52

## 2025-03-10 RX ADMIN — METHADONE HYDROCHLORIDE 5 MG: 10 TABLET ORAL at 20:49

## 2025-03-10 RX ADMIN — GUAIFENESIN 600 MG: 600 TABLET, EXTENDED RELEASE ORAL at 09:37

## 2025-03-10 RX ADMIN — INSULIN LISPRO 1 UNITS: 100 INJECTION, SOLUTION INTRAVENOUS; SUBCUTANEOUS at 17:41

## 2025-03-10 RX ADMIN — INSULIN GLARGINE 10 UNITS: 100 INJECTION, SOLUTION SUBCUTANEOUS at 09:03

## 2025-03-10 RX ADMIN — ARFORMOTEROL TARTRATE: 15 SOLUTION RESPIRATORY (INHALATION) at 19:20

## 2025-03-10 RX ADMIN — METHADONE HYDROCHLORIDE 5 MG: 10 TABLET ORAL at 09:18

## 2025-03-10 RX ADMIN — PANTOPRAZOLE SODIUM 40 MG: 40 TABLET, DELAYED RELEASE ORAL at 09:00

## 2025-03-10 RX ADMIN — INSULIN LISPRO 1 UNITS: 100 INJECTION, SOLUTION INTRAVENOUS; SUBCUTANEOUS at 09:00

## 2025-03-10 RX ADMIN — ACETYLCYSTEINE 600 MG: 200 INHALANT RESPIRATORY (INHALATION) at 08:20

## 2025-03-10 RX ADMIN — CETIRIZINE HYDROCHLORIDE 10 MG: 10 TABLET, FILM COATED ORAL at 20:45

## 2025-03-10 RX ADMIN — ARFORMOTEROL TARTRATE: 15 SOLUTION RESPIRATORY (INHALATION) at 08:10

## 2025-03-10 RX ADMIN — Medication 1 AMPULE: at 20:33

## 2025-03-10 RX ADMIN — ANASTROZOLE 1 MG: 1 TABLET, COATED ORAL at 11:55

## 2025-03-10 RX ADMIN — Medication: at 09:05

## 2025-03-10 RX ADMIN — Medication 4 ML: at 02:59

## 2025-03-10 RX ADMIN — Medication 1 CAPSULE: at 09:37

## 2025-03-10 RX ADMIN — METOPROLOL TARTRATE 25 MG: 25 TABLET, FILM COATED ORAL at 20:45

## 2025-03-10 RX ADMIN — DEXMEDETOMIDINE HYDROCHLORIDE 0.5 MCG/KG/HR: 400 INJECTION, SOLUTION INTRAVENOUS at 01:50

## 2025-03-10 RX ADMIN — APIXABAN 5 MG: 5 TABLET, FILM COATED ORAL at 09:07

## 2025-03-10 RX ADMIN — IPRATROPIUM BROMIDE AND ALBUTEROL SULFATE 1 DOSE: 2.5; .5 SOLUTION RESPIRATORY (INHALATION) at 08:20

## 2025-03-10 RX ADMIN — SODIUM CHLORIDE, PRESERVATIVE FREE 10 ML: 5 INJECTION INTRAVENOUS at 09:38

## 2025-03-10 RX ADMIN — SODIUM CHLORIDE 500 ML: 0.9 INJECTION, SOLUTION INTRAVENOUS at 22:28

## 2025-03-10 RX ADMIN — ACETYLCYSTEINE 600 MG: 200 INHALANT RESPIRATORY (INHALATION) at 13:36

## 2025-03-10 RX ADMIN — APIXABAN 5 MG: 5 TABLET, FILM COATED ORAL at 20:49

## 2025-03-10 RX ADMIN — METOPROLOL TARTRATE 25 MG: 25 TABLET, FILM COATED ORAL at 09:07

## 2025-03-10 RX ADMIN — ACETYLCYSTEINE 600 MG: 200 INHALANT RESPIRATORY (INHALATION) at 02:59

## 2025-03-10 RX ADMIN — IPRATROPIUM BROMIDE AND ALBUTEROL SULFATE 1 DOSE: 2.5; .5 SOLUTION RESPIRATORY (INHALATION) at 13:36

## 2025-03-10 RX ADMIN — Medication 1 AMPULE: at 09:43

## 2025-03-10 ASSESSMENT — PAIN SCALES - GENERAL
PAINLEVEL_OUTOF10: 0
PAINLEVEL_OUTOF10: 0

## 2025-03-10 NOTE — CONSULTS
PALLIATIVE MEDICINE      Palliative Medicine was re-consulted for goals of care.  Consult received, will see tomorrow.      Thank you for including Palliative Medicine in this patient's care.   ANDRA Loredo    
Palliative Medicine  Patient Name: Jessica Mane  YOB: 1970  MRN: [id number]  Age: 54 y.o.  Gender: female    Date of Initial Consult: 2/24/2025  Date of Service: 2/24/2025  Time: 4:17 PM  Provider: LUIS ANGEL Loredo NP  Hospital Day: 3  Admit Date: 2/22/2025  Referring Provider: Ignacia Santiago NP       Reasons for Consultation:  help with making end of life decisions    HISTORY OF PRESENT ILLNESS (HPI):   Jessica Mane is a 54 y.o. female with a past medical history ofwidely metastatic breast cancer on anastrazole, COPD w chronic hypoxia on 5L/min NC, HFpEF, TERRA non-compliant with CPAP, chronic pain on methadone, Hx of PE on eliquis, who was admitted on 2/22/2025 from home with a diagnosis of acute on chronic hypoxic hypercapnic respiratory failure.  She was recently hospitalized 2/7-2/10/2025 for bacterial PNA with CT findings of subtotal LLL atelectasis.  CXR today shows new pleural effusion with persistent left basilar opacity previously shown to reflect subtotal atelectasis of the lingula and left lower lobe.   Regarding her breast cancer diagnosis, pt was lost to follow-up, neither she nor her son were able to name her oncologist.     With regards to Hospice, I called [facility], they explained pt is in their Palliative program and they provide her pain management only.  Pt was supposed to meet with [facility] (which provides home based primary care) later this week for admission to their program.     Psychosocial: lives with nephew Mau.  Bedridden (since 8/2023) at baseline, has HHA       PALLIATIVE DIAGNOSES:    Frequent admissions: 4th admission in 3 month  SOB  Acute metabolic encephalopathy   Acute on chronic hypoxic hypercapnic respiratory failure  Pain due to neoplasm: Methadone 5mg bid, dilaudid 2-4mg q. 4 hours prn  Widely metastatic stage 4 breast cancer  Goals of care    ASSESSMENT AND PLAN:   Today, I am meeting Jessica Mane for goals of care.  Prior to 
  * 118* 128*   MCV 99.1* 100.7* 101.9*   MCH 28.6 28.5 28.5        Metabolic  Panel Recent Labs     02/24/25  0347 02/25/25  0411 02/26/25  0440    139 138   K 4.5 4.5 5.0   CL 96* 100 100   CO2 37* 41* 35*   BUN 21* 23* 21*   MG 1.8 2.1 2.0   PHOS 2.6 2.7 2.6        Pertinent Labs                Magdalena Ahumada PA-C  2/26/2025

## 2025-03-11 ENCOUNTER — APPOINTMENT (OUTPATIENT)
Facility: HOSPITAL | Age: 55
DRG: 189 | End: 2025-03-11
Payer: MEDICARE

## 2025-03-11 PROBLEM — J44.9 SEVERE CHRONIC OBSTRUCTIVE PULMONARY DISEASE (HCC): Status: ACTIVE | Noted: 2025-03-11

## 2025-03-11 LAB
ANION GAP SERPL CALC-SCNC: 5 MMOL/L (ref 2–12)
BUN SERPL-MCNC: 28 MG/DL (ref 6–20)
BUN/CREAT SERPL: 27 (ref 12–20)
CALCIUM SERPL-MCNC: 10.3 MG/DL (ref 8.5–10.1)
CHLORIDE SERPL-SCNC: 99 MMOL/L (ref 97–108)
CO2 SERPL-SCNC: 35 MMOL/L (ref 21–32)
CREAT SERPL-MCNC: 1.05 MG/DL (ref 0.55–1.02)
ERYTHROCYTE [DISTWIDTH] IN BLOOD BY AUTOMATED COUNT: 14.6 % (ref 11.5–14.5)
GLUCOSE BLD STRIP.AUTO-MCNC: 169 MG/DL (ref 65–117)
GLUCOSE BLD STRIP.AUTO-MCNC: 172 MG/DL (ref 65–117)
GLUCOSE BLD STRIP.AUTO-MCNC: 173 MG/DL (ref 65–117)
GLUCOSE BLD STRIP.AUTO-MCNC: 214 MG/DL (ref 65–117)
GLUCOSE SERPL-MCNC: 140 MG/DL (ref 65–100)
HCT VFR BLD AUTO: 42.6 % (ref 35–47)
HGB BLD-MCNC: 12.3 G/DL (ref 11.5–16)
MAGNESIUM SERPL-MCNC: 1.7 MG/DL (ref 1.6–2.4)
MCH RBC QN AUTO: 28.1 PG (ref 26–34)
MCHC RBC AUTO-ENTMCNC: 28.9 G/DL (ref 30–36.5)
MCV RBC AUTO: 97.3 FL (ref 80–99)
NRBC # BLD: 0.03 K/UL (ref 0–0.01)
NRBC BLD-RTO: 0.3 PER 100 WBC
PHOSPHATE SERPL-MCNC: 2.8 MG/DL (ref 2.6–4.7)
PLATELET # BLD AUTO: 274 K/UL (ref 150–400)
PMV BLD AUTO: 9.7 FL (ref 8.9–12.9)
POTASSIUM SERPL-SCNC: 3.8 MMOL/L (ref 3.5–5.1)
RBC # BLD AUTO: 4.38 M/UL (ref 3.8–5.2)
SERVICE CMNT-IMP: ABNORMAL
SODIUM SERPL-SCNC: 139 MMOL/L (ref 136–145)
WBC # BLD AUTO: 11.6 K/UL (ref 3.6–11)

## 2025-03-11 PROCEDURE — 2500000003 HC RX 250 WO HCPCS: Performed by: INTERNAL MEDICINE

## 2025-03-11 PROCEDURE — 6370000000 HC RX 637 (ALT 250 FOR IP): Performed by: STUDENT IN AN ORGANIZED HEALTH CARE EDUCATION/TRAINING PROGRAM

## 2025-03-11 PROCEDURE — 36415 COLL VENOUS BLD VENIPUNCTURE: CPT

## 2025-03-11 PROCEDURE — 6360000002 HC RX W HCPCS: Performed by: PHYSICIAN ASSISTANT

## 2025-03-11 PROCEDURE — 82962 GLUCOSE BLOOD TEST: CPT

## 2025-03-11 PROCEDURE — 94669 MECHANICAL CHEST WALL OSCILL: CPT

## 2025-03-11 PROCEDURE — 6370000000 HC RX 637 (ALT 250 FOR IP): Performed by: NURSE PRACTITIONER

## 2025-03-11 PROCEDURE — 71045 X-RAY EXAM CHEST 1 VIEW: CPT

## 2025-03-11 PROCEDURE — 2500000003 HC RX 250 WO HCPCS: Performed by: NURSE PRACTITIONER

## 2025-03-11 PROCEDURE — 6370000000 HC RX 637 (ALT 250 FOR IP): Performed by: INTERNAL MEDICINE

## 2025-03-11 PROCEDURE — 84100 ASSAY OF PHOSPHORUS: CPT

## 2025-03-11 PROCEDURE — 2580000003 HC RX 258: Performed by: HOSPITALIST

## 2025-03-11 PROCEDURE — 83735 ASSAY OF MAGNESIUM: CPT

## 2025-03-11 PROCEDURE — 80048 BASIC METABOLIC PNL TOTAL CA: CPT

## 2025-03-11 PROCEDURE — 2000000000 HC ICU R&B

## 2025-03-11 PROCEDURE — 94640 AIRWAY INHALATION TREATMENT: CPT

## 2025-03-11 PROCEDURE — 6360000002 HC RX W HCPCS: Performed by: HOSPITALIST

## 2025-03-11 PROCEDURE — 99232 SBSQ HOSP IP/OBS MODERATE 35: CPT | Performed by: NURSE PRACTITIONER

## 2025-03-11 PROCEDURE — 2700000000 HC OXYGEN THERAPY PER DAY

## 2025-03-11 PROCEDURE — 94660 CPAP INITIATION&MGMT: CPT

## 2025-03-11 PROCEDURE — 6370000000 HC RX 637 (ALT 250 FOR IP): Performed by: HOSPITALIST

## 2025-03-11 PROCEDURE — 85027 COMPLETE CBC AUTOMATED: CPT

## 2025-03-11 RX ORDER — INSULIN LISPRO 100 [IU]/ML
0-8 INJECTION, SOLUTION INTRAVENOUS; SUBCUTANEOUS EVERY 6 HOURS
Status: DISCONTINUED | OUTPATIENT
Start: 2025-03-11 | End: 2025-03-13

## 2025-03-11 RX ORDER — SODIUM CHLORIDE, SODIUM LACTATE, POTASSIUM CHLORIDE, AND CALCIUM CHLORIDE .6; .31; .03; .02 G/100ML; G/100ML; G/100ML; G/100ML
500 INJECTION, SOLUTION INTRAVENOUS ONCE
Status: COMPLETED | OUTPATIENT
Start: 2025-03-11 | End: 2025-03-11

## 2025-03-11 RX ORDER — DEXTROSE, SODIUM CHLORIDE, SODIUM LACTATE, POTASSIUM CHLORIDE, AND CALCIUM CHLORIDE 5; .6; .31; .03; .02 G/100ML; G/100ML; G/100ML; G/100ML; G/100ML
INJECTION, SOLUTION INTRAVENOUS CONTINUOUS
Status: DISCONTINUED | OUTPATIENT
Start: 2025-03-11 | End: 2025-03-12

## 2025-03-11 RX ORDER — IPRATROPIUM BROMIDE AND ALBUTEROL SULFATE 2.5; .5 MG/3ML; MG/3ML
1 SOLUTION RESPIRATORY (INHALATION)
Status: DISCONTINUED | OUTPATIENT
Start: 2025-03-11 | End: 2025-03-20 | Stop reason: HOSPADM

## 2025-03-11 RX ADMIN — DEXMEDETOMIDINE HYDROCHLORIDE 0.4 MCG/KG/HR: 400 INJECTION, SOLUTION INTRAVENOUS at 06:13

## 2025-03-11 RX ADMIN — PANTOPRAZOLE SODIUM 40 MG: 40 TABLET, DELAYED RELEASE ORAL at 09:00

## 2025-03-11 RX ADMIN — CETIRIZINE HYDROCHLORIDE 10 MG: 10 TABLET, FILM COATED ORAL at 20:01

## 2025-03-11 RX ADMIN — Medication 4 ML: at 01:18

## 2025-03-11 RX ADMIN — METOPROLOL TARTRATE 25 MG: 25 TABLET, FILM COATED ORAL at 09:01

## 2025-03-11 RX ADMIN — IPRATROPIUM BROMIDE AND ALBUTEROL SULFATE 1 DOSE: 2.5; .5 SOLUTION RESPIRATORY (INHALATION) at 13:26

## 2025-03-11 RX ADMIN — Medication 4 ML: at 13:26

## 2025-03-11 RX ADMIN — Medication 4 ML: at 07:15

## 2025-03-11 RX ADMIN — IPRATROPIUM BROMIDE AND ALBUTEROL SULFATE 1 DOSE: .5; 3 SOLUTION RESPIRATORY (INHALATION) at 18:07

## 2025-03-11 RX ADMIN — IPRATROPIUM BROMIDE AND ALBUTEROL SULFATE 1 DOSE: 2.5; .5 SOLUTION RESPIRATORY (INHALATION) at 20:59

## 2025-03-11 RX ADMIN — Medication: at 19:59

## 2025-03-11 RX ADMIN — ACETYLCYSTEINE 600 MG: 200 INHALANT RESPIRATORY (INHALATION) at 01:17

## 2025-03-11 RX ADMIN — ARFORMOTEROL TARTRATE: 15 SOLUTION RESPIRATORY (INHALATION) at 07:15

## 2025-03-11 RX ADMIN — ACETYLCYSTEINE 600 MG: 200 INHALANT RESPIRATORY (INHALATION) at 07:08

## 2025-03-11 RX ADMIN — SODIUM CHLORIDE, SODIUM LACTATE, POTASSIUM CHLORIDE, CALCIUM CHLORIDE AND DEXTROSE MONOHYDRATE: 5; 600; 310; 30; 20 INJECTION, SOLUTION INTRAVENOUS at 10:40

## 2025-03-11 RX ADMIN — Medication 4 ML: at 20:51

## 2025-03-11 RX ADMIN — Medication 1 AMPULE: at 09:05

## 2025-03-11 RX ADMIN — SODIUM CHLORIDE, PRESERVATIVE FREE 10 ML: 5 INJECTION INTRAVENOUS at 09:05

## 2025-03-11 RX ADMIN — DEXMEDETOMIDINE HYDROCHLORIDE 0.4 MCG/KG/HR: 400 INJECTION, SOLUTION INTRAVENOUS at 19:37

## 2025-03-11 RX ADMIN — INSULIN LISPRO 2 UNITS: 100 INJECTION, SOLUTION INTRAVENOUS; SUBCUTANEOUS at 13:27

## 2025-03-11 RX ADMIN — Medication 1 AMPULE: at 19:59

## 2025-03-11 RX ADMIN — IPRATROPIUM BROMIDE AND ALBUTEROL SULFATE 1 DOSE: 2.5; .5 SOLUTION RESPIRATORY (INHALATION) at 01:17

## 2025-03-11 RX ADMIN — APIXABAN 5 MG: 5 TABLET, FILM COATED ORAL at 09:01

## 2025-03-11 RX ADMIN — ARFORMOTEROL TARTRATE: 15 SOLUTION RESPIRATORY (INHALATION) at 20:59

## 2025-03-11 RX ADMIN — GUAIFENESIN 600 MG: 600 TABLET, EXTENDED RELEASE ORAL at 20:01

## 2025-03-11 RX ADMIN — GUAIFENESIN 600 MG: 600 TABLET, EXTENDED RELEASE ORAL at 08:56

## 2025-03-11 RX ADMIN — METHADONE HYDROCHLORIDE 5 MG: 10 TABLET ORAL at 08:56

## 2025-03-11 RX ADMIN — SODIUM CHLORIDE, SODIUM LACTATE, POTASSIUM CHLORIDE, AND CALCIUM CHLORIDE 500 ML: .6; .31; .03; .02 INJECTION, SOLUTION INTRAVENOUS at 10:43

## 2025-03-11 RX ADMIN — SODIUM CHLORIDE, PRESERVATIVE FREE 10 ML: 5 INJECTION INTRAVENOUS at 20:00

## 2025-03-11 RX ADMIN — APIXABAN 5 MG: 5 TABLET, FILM COATED ORAL at 20:02

## 2025-03-11 RX ADMIN — METOPROLOL TARTRATE 25 MG: 25 TABLET, FILM COATED ORAL at 20:01

## 2025-03-11 RX ADMIN — ANASTROZOLE 1 MG: 1 TABLET, COATED ORAL at 08:56

## 2025-03-11 RX ADMIN — ACETYLCYSTEINE 600 MG: 200 INHALANT RESPIRATORY (INHALATION) at 20:51

## 2025-03-11 RX ADMIN — IPRATROPIUM BROMIDE AND ALBUTEROL SULFATE 1 DOSE: 2.5; .5 SOLUTION RESPIRATORY (INHALATION) at 07:08

## 2025-03-11 RX ADMIN — METHADONE HYDROCHLORIDE 5 MG: 10 TABLET ORAL at 20:02

## 2025-03-11 RX ADMIN — Medication: at 09:03

## 2025-03-11 RX ADMIN — SODIUM CHLORIDE, SODIUM LACTATE, POTASSIUM CHLORIDE, CALCIUM CHLORIDE AND DEXTROSE MONOHYDRATE: 5; 600; 310; 30; 20 INJECTION, SOLUTION INTRAVENOUS at 23:42

## 2025-03-11 RX ADMIN — INSULIN GLARGINE 10 UNITS: 100 INJECTION, SOLUTION SUBCUTANEOUS at 09:03

## 2025-03-11 RX ADMIN — ACETYLCYSTEINE 600 MG: 200 INHALANT RESPIRATORY (INHALATION) at 13:26

## 2025-03-11 RX ADMIN — DEXMEDETOMIDINE HYDROCHLORIDE 0.4 MCG/KG/HR: 400 INJECTION, SOLUTION INTRAVENOUS at 12:10

## 2025-03-11 ASSESSMENT — PAIN SCALES - GENERAL
PAINLEVEL_OUTOF10: 0

## 2025-03-11 ASSESSMENT — PAIN SCALES - WONG BAKER: WONGBAKER_NUMERICALRESPONSE: NO HURT

## 2025-03-12 ENCOUNTER — APPOINTMENT (OUTPATIENT)
Facility: HOSPITAL | Age: 55
DRG: 189 | End: 2025-03-12
Payer: MEDICARE

## 2025-03-12 LAB
ANION GAP SERPL CALC-SCNC: 3 MMOL/L (ref 2–12)
BUN SERPL-MCNC: 21 MG/DL (ref 6–20)
BUN/CREAT SERPL: 26 (ref 12–20)
CALCIUM SERPL-MCNC: 9.4 MG/DL (ref 8.5–10.1)
CHLORIDE SERPL-SCNC: 99 MMOL/L (ref 97–108)
CO2 SERPL-SCNC: 36 MMOL/L (ref 21–32)
CREAT SERPL-MCNC: 0.82 MG/DL (ref 0.55–1.02)
ERYTHROCYTE [DISTWIDTH] IN BLOOD BY AUTOMATED COUNT: 14.5 % (ref 11.5–14.5)
GLUCOSE BLD STRIP.AUTO-MCNC: 200 MG/DL (ref 65–117)
GLUCOSE BLD STRIP.AUTO-MCNC: 205 MG/DL (ref 65–117)
GLUCOSE SERPL-MCNC: 153 MG/DL (ref 65–100)
HCT VFR BLD AUTO: 34.2 % (ref 35–47)
HGB BLD-MCNC: 10 G/DL (ref 11.5–16)
MCH RBC QN AUTO: 28.4 PG (ref 26–34)
MCHC RBC AUTO-ENTMCNC: 29.2 G/DL (ref 30–36.5)
MCV RBC AUTO: 97.2 FL (ref 80–99)
NRBC # BLD: 0 K/UL (ref 0–0.01)
NRBC BLD-RTO: 0 PER 100 WBC
PLATELET # BLD AUTO: 195 K/UL (ref 150–400)
PMV BLD AUTO: 9.9 FL (ref 8.9–12.9)
POTASSIUM SERPL-SCNC: 3.2 MMOL/L (ref 3.5–5.1)
RBC # BLD AUTO: 3.52 M/UL (ref 3.8–5.2)
SERVICE CMNT-IMP: ABNORMAL
SERVICE CMNT-IMP: ABNORMAL
SODIUM SERPL-SCNC: 138 MMOL/L (ref 136–145)
WBC # BLD AUTO: 6.6 K/UL (ref 3.6–11)

## 2025-03-12 PROCEDURE — 6360000002 HC RX W HCPCS: Performed by: PHYSICIAN ASSISTANT

## 2025-03-12 PROCEDURE — 6370000000 HC RX 637 (ALT 250 FOR IP): Performed by: INTERNAL MEDICINE

## 2025-03-12 PROCEDURE — 6370000000 HC RX 637 (ALT 250 FOR IP): Performed by: STUDENT IN AN ORGANIZED HEALTH CARE EDUCATION/TRAINING PROGRAM

## 2025-03-12 PROCEDURE — 6370000000 HC RX 637 (ALT 250 FOR IP): Performed by: HOSPITALIST

## 2025-03-12 PROCEDURE — 2000000000 HC ICU R&B

## 2025-03-12 PROCEDURE — 6370000000 HC RX 637 (ALT 250 FOR IP): Performed by: NURSE PRACTITIONER

## 2025-03-12 PROCEDURE — 71045 X-RAY EXAM CHEST 1 VIEW: CPT

## 2025-03-12 PROCEDURE — 80048 BASIC METABOLIC PNL TOTAL CA: CPT

## 2025-03-12 PROCEDURE — 36415 COLL VENOUS BLD VENIPUNCTURE: CPT

## 2025-03-12 PROCEDURE — 2500000003 HC RX 250 WO HCPCS: Performed by: INTERNAL MEDICINE

## 2025-03-12 PROCEDURE — 82962 GLUCOSE BLOOD TEST: CPT

## 2025-03-12 PROCEDURE — 2500000003 HC RX 250 WO HCPCS: Performed by: NURSE PRACTITIONER

## 2025-03-12 PROCEDURE — 85027 COMPLETE CBC AUTOMATED: CPT

## 2025-03-12 PROCEDURE — 99232 SBSQ HOSP IP/OBS MODERATE 35: CPT | Performed by: NURSE PRACTITIONER

## 2025-03-12 PROCEDURE — 94660 CPAP INITIATION&MGMT: CPT

## 2025-03-12 PROCEDURE — 94640 AIRWAY INHALATION TREATMENT: CPT

## 2025-03-12 PROCEDURE — 2580000003 HC RX 258: Performed by: HOSPITALIST

## 2025-03-12 PROCEDURE — 92610 EVALUATE SWALLOWING FUNCTION: CPT

## 2025-03-12 PROCEDURE — 6360000002 HC RX W HCPCS: Performed by: HOSPITALIST

## 2025-03-12 RX ORDER — POLYETHYLENE GLYCOL 3350 17 G/17G
17 POWDER, FOR SOLUTION ORAL DAILY
Status: DISCONTINUED | OUTPATIENT
Start: 2025-03-12 | End: 2025-03-20 | Stop reason: HOSPADM

## 2025-03-12 RX ORDER — NOREPINEPHRINE BITARTRATE 0.06 MG/ML
1-100 INJECTION, SOLUTION INTRAVENOUS CONTINUOUS
Status: DISCONTINUED | OUTPATIENT
Start: 2025-03-12 | End: 2025-03-13

## 2025-03-12 RX ORDER — SENNA AND DOCUSATE SODIUM 50; 8.6 MG/1; MG/1
2 TABLET, FILM COATED ORAL 2 TIMES DAILY
Status: DISCONTINUED | OUTPATIENT
Start: 2025-03-12 | End: 2025-03-20 | Stop reason: HOSPADM

## 2025-03-12 RX ORDER — POTASSIUM CHLORIDE 1500 MG/1
40 TABLET, EXTENDED RELEASE ORAL ONCE
Status: COMPLETED | OUTPATIENT
Start: 2025-03-12 | End: 2025-03-12

## 2025-03-12 RX ADMIN — CETIRIZINE HYDROCHLORIDE 10 MG: 10 TABLET, FILM COATED ORAL at 20:07

## 2025-03-12 RX ADMIN — GUAIFENESIN 600 MG: 600 TABLET, EXTENDED RELEASE ORAL at 20:07

## 2025-03-12 RX ADMIN — POTASSIUM CHLORIDE 40 MEQ: 1500 TABLET, EXTENDED RELEASE ORAL at 05:49

## 2025-03-12 RX ADMIN — NOREPINEPHRINE BITARTRATE 5 MCG/MIN: 64 SOLUTION INTRAVENOUS at 08:39

## 2025-03-12 RX ADMIN — Medication 4 ML: at 09:02

## 2025-03-12 RX ADMIN — ACETYLCYSTEINE 600 MG: 200 INHALANT RESPIRATORY (INHALATION) at 08:42

## 2025-03-12 RX ADMIN — METHADONE HYDROCHLORIDE 5 MG: 10 TABLET ORAL at 08:48

## 2025-03-12 RX ADMIN — Medication 1 AMPULE: at 20:07

## 2025-03-12 RX ADMIN — ACETYLCYSTEINE 600 MG: 200 INHALANT RESPIRATORY (INHALATION) at 21:00

## 2025-03-12 RX ADMIN — Medication: at 08:48

## 2025-03-12 RX ADMIN — HYDROMORPHONE HYDROCHLORIDE 2 MG: 2 TABLET ORAL at 18:30

## 2025-03-12 RX ADMIN — ARFORMOTEROL TARTRATE: 15 SOLUTION RESPIRATORY (INHALATION) at 21:00

## 2025-03-12 RX ADMIN — GUAIFENESIN 600 MG: 600 TABLET, EXTENDED RELEASE ORAL at 08:48

## 2025-03-12 RX ADMIN — ARFORMOTEROL TARTRATE: 15 SOLUTION RESPIRATORY (INHALATION) at 08:48

## 2025-03-12 RX ADMIN — INSULIN LISPRO 2 UNITS: 100 INJECTION, SOLUTION INTRAVENOUS; SUBCUTANEOUS at 12:14

## 2025-03-12 RX ADMIN — APIXABAN 5 MG: 5 TABLET, FILM COATED ORAL at 08:48

## 2025-03-12 RX ADMIN — POLYETHYLENE GLYCOL 3350 17 G: 17 POWDER, FOR SOLUTION ORAL at 12:14

## 2025-03-12 RX ADMIN — SODIUM CHLORIDE, PRESERVATIVE FREE 10 ML: 5 INJECTION INTRAVENOUS at 20:09

## 2025-03-12 RX ADMIN — APIXABAN 5 MG: 5 TABLET, FILM COATED ORAL at 20:07

## 2025-03-12 RX ADMIN — ANASTROZOLE 1 MG: 1 TABLET, COATED ORAL at 08:50

## 2025-03-12 RX ADMIN — Medication 4 ML: at 21:00

## 2025-03-12 RX ADMIN — METOPROLOL TARTRATE 25 MG: 25 TABLET, FILM COATED ORAL at 20:07

## 2025-03-12 RX ADMIN — INSULIN GLARGINE 10 UNITS: 100 INJECTION, SOLUTION SUBCUTANEOUS at 08:50

## 2025-03-12 RX ADMIN — ACETYLCYSTEINE 600 MG: 200 INHALANT RESPIRATORY (INHALATION) at 15:33

## 2025-03-12 RX ADMIN — SODIUM CHLORIDE, PRESERVATIVE FREE 10 ML: 5 INJECTION INTRAVENOUS at 08:51

## 2025-03-12 RX ADMIN — PANTOPRAZOLE SODIUM 40 MG: 40 TABLET, DELAYED RELEASE ORAL at 05:50

## 2025-03-12 RX ADMIN — METHADONE HYDROCHLORIDE 5 MG: 10 TABLET ORAL at 20:25

## 2025-03-12 RX ADMIN — SENNOSIDES AND DOCUSATE SODIUM 2 TABLET: 50; 8.6 TABLET ORAL at 12:14

## 2025-03-12 RX ADMIN — IPRATROPIUM BROMIDE AND ALBUTEROL SULFATE 1 DOSE: 2.5; .5 SOLUTION RESPIRATORY (INHALATION) at 03:01

## 2025-03-12 RX ADMIN — IPRATROPIUM BROMIDE AND ALBUTEROL SULFATE 1 DOSE: 2.5; .5 SOLUTION RESPIRATORY (INHALATION) at 08:42

## 2025-03-12 RX ADMIN — INSULIN LISPRO 2 UNITS: 100 INJECTION, SOLUTION INTRAVENOUS; SUBCUTANEOUS at 17:54

## 2025-03-12 RX ADMIN — Medication: at 20:08

## 2025-03-12 RX ADMIN — SENNOSIDES AND DOCUSATE SODIUM 2 TABLET: 50; 8.6 TABLET ORAL at 20:07

## 2025-03-12 RX ADMIN — Medication 1 AMPULE: at 08:50

## 2025-03-12 RX ADMIN — IPRATROPIUM BROMIDE AND ALBUTEROL SULFATE 1 DOSE: 2.5; .5 SOLUTION RESPIRATORY (INHALATION) at 21:00

## 2025-03-12 ASSESSMENT — PAIN DESCRIPTION - DESCRIPTORS: DESCRIPTORS: ACHING

## 2025-03-12 ASSESSMENT — PAIN SCALES - GENERAL
PAINLEVEL_OUTOF10: 5
PAINLEVEL_OUTOF10: 5
PAINLEVEL_OUTOF10: 0
PAINLEVEL_OUTOF10: 5

## 2025-03-12 ASSESSMENT — PAIN DESCRIPTION - LOCATION
LOCATION: BACK
LOCATION: GENERALIZED

## 2025-03-12 ASSESSMENT — PAIN DESCRIPTION - ORIENTATION: ORIENTATION: MID

## 2025-03-12 ASSESSMENT — PAIN SCALES - WONG BAKER
WONGBAKER_NUMERICALRESPONSE: NO HURT
WONGBAKER_NUMERICALRESPONSE: NO HURT

## 2025-03-13 ENCOUNTER — APPOINTMENT (OUTPATIENT)
Facility: HOSPITAL | Age: 55
DRG: 189 | End: 2025-03-13
Payer: MEDICARE

## 2025-03-13 LAB
ALBUMIN SERPL-MCNC: 1.7 G/DL (ref 3.5–5)
ALBUMIN/GLOB SERPL: 0.4 (ref 1.1–2.2)
ALP SERPL-CCNC: 175 U/L (ref 45–117)
ALT SERPL-CCNC: 35 U/L (ref 12–78)
ANION GAP SERPL CALC-SCNC: 4 MMOL/L (ref 2–12)
AST SERPL-CCNC: 41 U/L (ref 15–37)
BILIRUB DIRECT SERPL-MCNC: 0.6 MG/DL (ref 0–0.2)
BILIRUB SERPL-MCNC: 1 MG/DL (ref 0.2–1)
BUN SERPL-MCNC: 16 MG/DL (ref 6–20)
BUN/CREAT SERPL: 19 (ref 12–20)
CALCIUM SERPL-MCNC: 9.2 MG/DL (ref 8.5–10.1)
CHLORIDE SERPL-SCNC: 100 MMOL/L (ref 97–108)
CO2 SERPL-SCNC: 35 MMOL/L (ref 21–32)
CREAT SERPL-MCNC: 0.86 MG/DL (ref 0.55–1.02)
ERYTHROCYTE [DISTWIDTH] IN BLOOD BY AUTOMATED COUNT: 14.4 % (ref 11.5–14.5)
GLOBULIN SER CALC-MCNC: 3.9 G/DL (ref 2–4)
GLUCOSE BLD STRIP.AUTO-MCNC: 103 MG/DL (ref 65–117)
GLUCOSE BLD STRIP.AUTO-MCNC: 144 MG/DL (ref 65–117)
GLUCOSE BLD STRIP.AUTO-MCNC: 168 MG/DL (ref 65–117)
GLUCOSE BLD STRIP.AUTO-MCNC: 187 MG/DL (ref 65–117)
GLUCOSE SERPL-MCNC: 77 MG/DL (ref 65–100)
HCT VFR BLD AUTO: 35.4 % (ref 35–47)
HGB BLD-MCNC: 10 G/DL (ref 11.5–16)
MAGNESIUM SERPL-MCNC: 1.5 MG/DL (ref 1.6–2.4)
MCH RBC QN AUTO: 27.8 PG (ref 26–34)
MCHC RBC AUTO-ENTMCNC: 28.2 G/DL (ref 30–36.5)
MCV RBC AUTO: 98.3 FL (ref 80–99)
NRBC # BLD: 0.02 K/UL (ref 0–0.01)
NRBC BLD-RTO: 0.3 PER 100 WBC
PHOSPHATE SERPL-MCNC: 2.3 MG/DL (ref 2.6–4.7)
PLATELET # BLD AUTO: 180 K/UL (ref 150–400)
PMV BLD AUTO: 9.9 FL (ref 8.9–12.9)
POTASSIUM SERPL-SCNC: 3.5 MMOL/L (ref 3.5–5.1)
PROT SERPL-MCNC: 5.6 G/DL (ref 6.4–8.2)
RBC # BLD AUTO: 3.6 M/UL (ref 3.8–5.2)
SERVICE CMNT-IMP: ABNORMAL
SERVICE CMNT-IMP: NORMAL
SODIUM SERPL-SCNC: 139 MMOL/L (ref 136–145)
WBC # BLD AUTO: 7 K/UL (ref 3.6–11)

## 2025-03-13 PROCEDURE — 94640 AIRWAY INHALATION TREATMENT: CPT

## 2025-03-13 PROCEDURE — 6360000002 HC RX W HCPCS: Performed by: HOSPITALIST

## 2025-03-13 PROCEDURE — 99497 ADVNCD CARE PLAN 30 MIN: CPT | Performed by: NURSE PRACTITIONER

## 2025-03-13 PROCEDURE — 6370000000 HC RX 637 (ALT 250 FOR IP): Performed by: INTERNAL MEDICINE

## 2025-03-13 PROCEDURE — 71045 X-RAY EXAM CHEST 1 VIEW: CPT

## 2025-03-13 PROCEDURE — 94669 MECHANICAL CHEST WALL OSCILL: CPT

## 2025-03-13 PROCEDURE — 80076 HEPATIC FUNCTION PANEL: CPT

## 2025-03-13 PROCEDURE — 6370000000 HC RX 637 (ALT 250 FOR IP): Performed by: NURSE PRACTITIONER

## 2025-03-13 PROCEDURE — 82962 GLUCOSE BLOOD TEST: CPT

## 2025-03-13 PROCEDURE — 6370000000 HC RX 637 (ALT 250 FOR IP): Performed by: HOSPITALIST

## 2025-03-13 PROCEDURE — 36415 COLL VENOUS BLD VENIPUNCTURE: CPT

## 2025-03-13 PROCEDURE — 94668 MNPJ CHEST WALL SBSQ: CPT

## 2025-03-13 PROCEDURE — 6370000000 HC RX 637 (ALT 250 FOR IP): Performed by: STUDENT IN AN ORGANIZED HEALTH CARE EDUCATION/TRAINING PROGRAM

## 2025-03-13 PROCEDURE — 6360000002 HC RX W HCPCS: Performed by: PHYSICIAN ASSISTANT

## 2025-03-13 PROCEDURE — 2580000003 HC RX 258: Performed by: HOSPITALIST

## 2025-03-13 PROCEDURE — 94660 CPAP INITIATION&MGMT: CPT

## 2025-03-13 PROCEDURE — 2500000003 HC RX 250 WO HCPCS: Performed by: NURSE PRACTITIONER

## 2025-03-13 PROCEDURE — 99232 SBSQ HOSP IP/OBS MODERATE 35: CPT | Performed by: NURSE PRACTITIONER

## 2025-03-13 PROCEDURE — 85027 COMPLETE CBC AUTOMATED: CPT

## 2025-03-13 PROCEDURE — 83735 ASSAY OF MAGNESIUM: CPT

## 2025-03-13 PROCEDURE — 2000000000 HC ICU R&B

## 2025-03-13 PROCEDURE — 80048 BASIC METABOLIC PNL TOTAL CA: CPT

## 2025-03-13 PROCEDURE — 2700000000 HC OXYGEN THERAPY PER DAY

## 2025-03-13 PROCEDURE — 84100 ASSAY OF PHOSPHORUS: CPT

## 2025-03-13 RX ORDER — MAGNESIUM SULFATE IN WATER 40 MG/ML
2000 INJECTION, SOLUTION INTRAVENOUS ONCE
Status: COMPLETED | OUTPATIENT
Start: 2025-03-13 | End: 2025-03-13

## 2025-03-13 RX ORDER — METOPROLOL TARTRATE 25 MG/1
12.5 TABLET, FILM COATED ORAL 2 TIMES DAILY
Status: DISCONTINUED | OUTPATIENT
Start: 2025-03-13 | End: 2025-03-20 | Stop reason: HOSPADM

## 2025-03-13 RX ORDER — INSULIN LISPRO 100 [IU]/ML
0-8 INJECTION, SOLUTION INTRAVENOUS; SUBCUTANEOUS
Status: DISCONTINUED | OUTPATIENT
Start: 2025-03-14 | End: 2025-03-20 | Stop reason: HOSPADM

## 2025-03-13 RX ADMIN — APIXABAN 5 MG: 5 TABLET, FILM COATED ORAL at 20:35

## 2025-03-13 RX ADMIN — IPRATROPIUM BROMIDE AND ALBUTEROL SULFATE 1 DOSE: 2.5; .5 SOLUTION RESPIRATORY (INHALATION) at 14:38

## 2025-03-13 RX ADMIN — SODIUM CHLORIDE, PRESERVATIVE FREE 10 ML: 5 INJECTION INTRAVENOUS at 20:34

## 2025-03-13 RX ADMIN — Medication: at 20:34

## 2025-03-13 RX ADMIN — INSULIN GLARGINE 10 UNITS: 100 INJECTION, SOLUTION SUBCUTANEOUS at 08:57

## 2025-03-13 RX ADMIN — APIXABAN 5 MG: 5 TABLET, FILM COATED ORAL at 08:48

## 2025-03-13 RX ADMIN — Medication 4 ML: at 22:23

## 2025-03-13 RX ADMIN — IPRATROPIUM BROMIDE AND ALBUTEROL SULFATE 1 DOSE: 2.5; .5 SOLUTION RESPIRATORY (INHALATION) at 02:38

## 2025-03-13 RX ADMIN — Medication 1 AMPULE: at 20:35

## 2025-03-13 RX ADMIN — ACETAMINOPHEN 650 MG: 325 TABLET ORAL at 02:00

## 2025-03-13 RX ADMIN — SENNOSIDES AND DOCUSATE SODIUM 2 TABLET: 50; 8.6 TABLET ORAL at 08:55

## 2025-03-13 RX ADMIN — GUAIFENESIN 600 MG: 600 TABLET, EXTENDED RELEASE ORAL at 08:48

## 2025-03-13 RX ADMIN — METHADONE HYDROCHLORIDE 5 MG: 10 TABLET ORAL at 20:34

## 2025-03-13 RX ADMIN — Medication 4 ML: at 07:33

## 2025-03-13 RX ADMIN — POLYETHYLENE GLYCOL 3350 17 G: 17 POWDER, FOR SOLUTION ORAL at 08:55

## 2025-03-13 RX ADMIN — ARFORMOTEROL TARTRATE: 15 SOLUTION RESPIRATORY (INHALATION) at 22:23

## 2025-03-13 RX ADMIN — Medication 4 ML: at 02:38

## 2025-03-13 RX ADMIN — ACETYLCYSTEINE 600 MG: 200 INHALANT RESPIRATORY (INHALATION) at 14:38

## 2025-03-13 RX ADMIN — ARFORMOTEROL TARTRATE: 15 SOLUTION RESPIRATORY (INHALATION) at 07:39

## 2025-03-13 RX ADMIN — Medication 1 AMPULE: at 08:48

## 2025-03-13 RX ADMIN — Medication: at 08:48

## 2025-03-13 RX ADMIN — Medication 4 ML: at 14:38

## 2025-03-13 RX ADMIN — HYDROMORPHONE HYDROCHLORIDE 2 MG: 2 TABLET ORAL at 23:45

## 2025-03-13 RX ADMIN — CETIRIZINE HYDROCHLORIDE 10 MG: 10 TABLET, FILM COATED ORAL at 20:33

## 2025-03-13 RX ADMIN — IPRATROPIUM BROMIDE AND ALBUTEROL SULFATE 1 DOSE: 2.5; .5 SOLUTION RESPIRATORY (INHALATION) at 22:24

## 2025-03-13 RX ADMIN — POTASSIUM & SODIUM PHOSPHATES POWDER PACK 280-160-250 MG 500 MG: 280-160-250 PACK at 11:48

## 2025-03-13 RX ADMIN — SODIUM CHLORIDE, PRESERVATIVE FREE 10 ML: 5 INJECTION INTRAVENOUS at 09:30

## 2025-03-13 RX ADMIN — METOPROLOL TARTRATE 12.5 MG: 25 TABLET, FILM COATED ORAL at 20:56

## 2025-03-13 RX ADMIN — ACETYLCYSTEINE 600 MG: 200 INHALANT RESPIRATORY (INHALATION) at 07:33

## 2025-03-13 RX ADMIN — INSULIN LISPRO 2 UNITS: 100 INJECTION, SOLUTION INTRAVENOUS; SUBCUTANEOUS at 11:56

## 2025-03-13 RX ADMIN — ACETYLCYSTEINE 600 MG: 200 INHALANT RESPIRATORY (INHALATION) at 22:23

## 2025-03-13 RX ADMIN — IPRATROPIUM BROMIDE AND ALBUTEROL SULFATE 1 DOSE: 2.5; .5 SOLUTION RESPIRATORY (INHALATION) at 07:33

## 2025-03-13 RX ADMIN — PANTOPRAZOLE SODIUM 40 MG: 40 TABLET, DELAYED RELEASE ORAL at 05:59

## 2025-03-13 RX ADMIN — METOPROLOL TARTRATE 25 MG: 25 TABLET, FILM COATED ORAL at 08:48

## 2025-03-13 RX ADMIN — ANASTROZOLE 1 MG: 1 TABLET, COATED ORAL at 08:51

## 2025-03-13 RX ADMIN — METHADONE HYDROCHLORIDE 5 MG: 10 TABLET ORAL at 08:49

## 2025-03-13 RX ADMIN — MAGNESIUM SULFATE HEPTAHYDRATE 2000 MG: 40 INJECTION, SOLUTION INTRAVENOUS at 11:56

## 2025-03-13 ASSESSMENT — PAIN DESCRIPTION - ORIENTATION
ORIENTATION: LOWER
ORIENTATION: LEFT;RIGHT;POSTERIOR

## 2025-03-13 ASSESSMENT — PAIN DESCRIPTION - ONSET: ONSET: GRADUAL

## 2025-03-13 ASSESSMENT — PAIN DESCRIPTION - LOCATION
LOCATION: BACK
LOCATION: LEG;BACK

## 2025-03-13 ASSESSMENT — PAIN SCALES - GENERAL
PAINLEVEL_OUTOF10: 0
PAINLEVEL_OUTOF10: 0
PAINLEVEL_OUTOF10: 4
PAINLEVEL_OUTOF10: 0
PAINLEVEL_OUTOF10: 7
PAINLEVEL_OUTOF10: 0

## 2025-03-13 ASSESSMENT — PAIN SCALES - WONG BAKER
WONGBAKER_NUMERICALRESPONSE: NO HURT

## 2025-03-13 ASSESSMENT — PAIN - FUNCTIONAL ASSESSMENT: PAIN_FUNCTIONAL_ASSESSMENT: PREVENTS OR INTERFERES SOME ACTIVE ACTIVITIES AND ADLS

## 2025-03-13 ASSESSMENT — PAIN DESCRIPTION - DESCRIPTORS
DESCRIPTORS: ACHING
DESCRIPTORS: THROBBING;ACHING

## 2025-03-13 ASSESSMENT — PAIN DESCRIPTION - PAIN TYPE
TYPE: CHRONIC PAIN
TYPE: CHRONIC PAIN

## 2025-03-13 ASSESSMENT — PAIN DESCRIPTION - FREQUENCY
FREQUENCY: INTERMITTENT
FREQUENCY: CONTINUOUS

## 2025-03-13 NOTE — INTERDISCIPLINARY ROUNDS
Critical care interdisciplinary rounds today.  Following members present: Case Management,  Nursing, Nutrition, Pharmacy, and Physician. Ready to transfer per RN.  Plan of care discussed.  See clinical pathway for plan of care and interventions and desired outcomes.

## 2025-03-13 NOTE — ACP (ADVANCE CARE PLANNING)
Advance Care Planning     Palliative Team Advance Care Planning (ACP) Conversation    Date of Conversation: 02/28/25    Individuals present for the conversation: Patient with decision making capacity and Sister Janae (Ariana Don)     ACP documents on file prior to discussion:  -Portable DNR  -POLST    Previously completed document/s not on file: Not discussed.    Healthcare Decision Maker:  Primary HCDM:           Yunior Allen, son [phone]  Secondary HCDM:      Ariana Don (Avinash Mane, sister [phone]  Supplemental HCDM:  Flora Painter, friend [phone]    Conversation Summary:    Reviewed patient's ACP documents with her and her sister.   Her son visited last night and her friend, Flora, who was listed as her primary HCDM did not show or call this afternoon.    Ms. Mane wants to remain full code.  She does not want to go to SNF for any reason.     She wants to return to her home where she is cared for by her nephew and other family members as well as Medicaid personal care aides.    Resuscitation Status:   Code Status: Full Code     Documentation Completed:    -Power of  for Healthcare and -Other        Ms. Mane rescinded her DDNR and POLST forms as she wants to remain full code.    Updated AMD and rescinded DDNR and POLST forms were copied for chart.     Latosha Lin LCSW        
new document completed, original was provided to patient and/or family member.    Copy was placed for scanning into the [facility] EMR.      I spent 25 minutes providing separately identifiable ACP services with the patient and/or surrogate decision maker in a voluntary, in-person conversation discussing the patient's wishes and goals as detailed in the above note.       Ijeoma Hebert, APRN - NP

## 2025-03-14 ENCOUNTER — APPOINTMENT (OUTPATIENT)
Facility: HOSPITAL | Age: 55
DRG: 189 | End: 2025-03-14
Payer: MEDICARE

## 2025-03-14 LAB
ANION GAP SERPL CALC-SCNC: 4 MMOL/L (ref 2–12)
BUN SERPL-MCNC: 15 MG/DL (ref 6–20)
BUN/CREAT SERPL: 20 (ref 12–20)
CALCIUM SERPL-MCNC: 9.3 MG/DL (ref 8.5–10.1)
CHLORIDE SERPL-SCNC: 99 MMOL/L (ref 97–108)
CO2 SERPL-SCNC: 35 MMOL/L (ref 21–32)
CREAT SERPL-MCNC: 0.74 MG/DL (ref 0.55–1.02)
ERYTHROCYTE [DISTWIDTH] IN BLOOD BY AUTOMATED COUNT: 14.6 % (ref 11.5–14.5)
GLUCOSE BLD STRIP.AUTO-MCNC: 120 MG/DL (ref 65–117)
GLUCOSE BLD STRIP.AUTO-MCNC: 160 MG/DL (ref 65–117)
GLUCOSE BLD STRIP.AUTO-MCNC: 254 MG/DL (ref 65–117)
GLUCOSE BLD STRIP.AUTO-MCNC: 78 MG/DL (ref 65–117)
GLUCOSE SERPL-MCNC: 98 MG/DL (ref 65–100)
HCT VFR BLD AUTO: 37.2 % (ref 35–47)
HGB BLD-MCNC: 10.6 G/DL (ref 11.5–16)
MAGNESIUM SERPL-MCNC: 3.3 MG/DL (ref 1.6–2.4)
MCH RBC QN AUTO: 27.8 PG (ref 26–34)
MCHC RBC AUTO-ENTMCNC: 28.5 G/DL (ref 30–36.5)
MCV RBC AUTO: 97.6 FL (ref 80–99)
NRBC # BLD: 0 K/UL (ref 0–0.01)
NRBC BLD-RTO: 0 PER 100 WBC
PHOSPHATE SERPL-MCNC: 2.6 MG/DL (ref 2.6–4.7)
PLATELET # BLD AUTO: 172 K/UL (ref 150–400)
PMV BLD AUTO: 10.1 FL (ref 8.9–12.9)
POTASSIUM SERPL-SCNC: 3.9 MMOL/L (ref 3.5–5.1)
RBC # BLD AUTO: 3.81 M/UL (ref 3.8–5.2)
SERVICE CMNT-IMP: ABNORMAL
SERVICE CMNT-IMP: NORMAL
SODIUM SERPL-SCNC: 138 MMOL/L (ref 136–145)
WBC # BLD AUTO: 6.3 K/UL (ref 3.6–11)

## 2025-03-14 PROCEDURE — 6370000000 HC RX 637 (ALT 250 FOR IP): Performed by: HOSPITALIST

## 2025-03-14 PROCEDURE — 36415 COLL VENOUS BLD VENIPUNCTURE: CPT

## 2025-03-14 PROCEDURE — 2000000000 HC ICU R&B

## 2025-03-14 PROCEDURE — 51701 INSERT BLADDER CATHETER: CPT

## 2025-03-14 PROCEDURE — 84100 ASSAY OF PHOSPHORUS: CPT

## 2025-03-14 PROCEDURE — 82962 GLUCOSE BLOOD TEST: CPT

## 2025-03-14 PROCEDURE — 71045 X-RAY EXAM CHEST 1 VIEW: CPT

## 2025-03-14 PROCEDURE — 94660 CPAP INITIATION&MGMT: CPT

## 2025-03-14 PROCEDURE — 6360000002 HC RX W HCPCS: Performed by: PHYSICIAN ASSISTANT

## 2025-03-14 PROCEDURE — 6370000000 HC RX 637 (ALT 250 FOR IP): Performed by: STUDENT IN AN ORGANIZED HEALTH CARE EDUCATION/TRAINING PROGRAM

## 2025-03-14 PROCEDURE — 51798 US URINE CAPACITY MEASURE: CPT

## 2025-03-14 PROCEDURE — 2500000003 HC RX 250 WO HCPCS: Performed by: NURSE PRACTITIONER

## 2025-03-14 PROCEDURE — 6360000002 HC RX W HCPCS: Performed by: HOSPITALIST

## 2025-03-14 PROCEDURE — 6370000000 HC RX 637 (ALT 250 FOR IP): Performed by: NURSE PRACTITIONER

## 2025-03-14 PROCEDURE — 6370000000 HC RX 637 (ALT 250 FOR IP): Performed by: INTERNAL MEDICINE

## 2025-03-14 PROCEDURE — 2580000003 HC RX 258: Performed by: HOSPITALIST

## 2025-03-14 PROCEDURE — 2700000000 HC OXYGEN THERAPY PER DAY

## 2025-03-14 PROCEDURE — 83735 ASSAY OF MAGNESIUM: CPT

## 2025-03-14 PROCEDURE — 94640 AIRWAY INHALATION TREATMENT: CPT

## 2025-03-14 PROCEDURE — 85027 COMPLETE CBC AUTOMATED: CPT

## 2025-03-14 PROCEDURE — 80048 BASIC METABOLIC PNL TOTAL CA: CPT

## 2025-03-14 RX ADMIN — GUAIFENESIN 600 MG: 600 TABLET, EXTENDED RELEASE ORAL at 20:22

## 2025-03-14 RX ADMIN — Medication 4 ML: at 20:47

## 2025-03-14 RX ADMIN — IPRATROPIUM BROMIDE AND ALBUTEROL SULFATE 1 DOSE: 2.5; .5 SOLUTION RESPIRATORY (INHALATION) at 08:27

## 2025-03-14 RX ADMIN — ACETYLCYSTEINE 600 MG: 200 INHALANT RESPIRATORY (INHALATION) at 13:19

## 2025-03-14 RX ADMIN — PANTOPRAZOLE SODIUM 40 MG: 40 TABLET, DELAYED RELEASE ORAL at 09:36

## 2025-03-14 RX ADMIN — Medication 4 ML: at 13:19

## 2025-03-14 RX ADMIN — IPRATROPIUM BROMIDE AND ALBUTEROL SULFATE 1 DOSE: 2.5; .5 SOLUTION RESPIRATORY (INHALATION) at 03:25

## 2025-03-14 RX ADMIN — Medication 1 AMPULE: at 08:01

## 2025-03-14 RX ADMIN — ANASTROZOLE 1 MG: 1 TABLET, COATED ORAL at 08:49

## 2025-03-14 RX ADMIN — METHADONE HYDROCHLORIDE 5 MG: 10 TABLET ORAL at 22:47

## 2025-03-14 RX ADMIN — ACETYLCYSTEINE 600 MG: 200 INHALANT RESPIRATORY (INHALATION) at 08:27

## 2025-03-14 RX ADMIN — HYDROMORPHONE HYDROCHLORIDE 0.5 MG: 1 INJECTION, SOLUTION INTRAMUSCULAR; INTRAVENOUS; SUBCUTANEOUS at 13:32

## 2025-03-14 RX ADMIN — CETIRIZINE HYDROCHLORIDE 10 MG: 10 TABLET, FILM COATED ORAL at 20:24

## 2025-03-14 RX ADMIN — SODIUM CHLORIDE, PRESERVATIVE FREE 10 ML: 5 INJECTION INTRAVENOUS at 08:02

## 2025-03-14 RX ADMIN — ARFORMOTEROL TARTRATE: 15 SOLUTION RESPIRATORY (INHALATION) at 20:49

## 2025-03-14 RX ADMIN — APIXABAN 5 MG: 5 TABLET, FILM COATED ORAL at 20:41

## 2025-03-14 RX ADMIN — Medication: at 20:27

## 2025-03-14 RX ADMIN — POLYETHYLENE GLYCOL 3350 17 G: 17 POWDER, FOR SOLUTION ORAL at 18:31

## 2025-03-14 RX ADMIN — SENNOSIDES AND DOCUSATE SODIUM 2 TABLET: 50; 8.6 TABLET ORAL at 08:49

## 2025-03-14 RX ADMIN — SODIUM CHLORIDE, PRESERVATIVE FREE 10 ML: 5 INJECTION INTRAVENOUS at 20:27

## 2025-03-14 RX ADMIN — ACETYLCYSTEINE 600 MG: 200 INHALANT RESPIRATORY (INHALATION) at 20:43

## 2025-03-14 RX ADMIN — ARFORMOTEROL TARTRATE: 15 SOLUTION RESPIRATORY (INHALATION) at 08:57

## 2025-03-14 RX ADMIN — POLYETHYLENE GLYCOL 3350 17 G: 17 POWDER, FOR SOLUTION ORAL at 08:50

## 2025-03-14 RX ADMIN — INSULIN LISPRO 4 UNITS: 100 INJECTION, SOLUTION INTRAVENOUS; SUBCUTANEOUS at 20:56

## 2025-03-14 RX ADMIN — Medication 4 ML: at 03:25

## 2025-03-14 RX ADMIN — HYDROMORPHONE HYDROCHLORIDE 2 MG: 2 TABLET ORAL at 11:30

## 2025-03-14 RX ADMIN — Medication: at 04:24

## 2025-03-14 RX ADMIN — METHADONE HYDROCHLORIDE 5 MG: 10 TABLET ORAL at 07:58

## 2025-03-14 RX ADMIN — SENNOSIDES AND DOCUSATE SODIUM 2 TABLET: 50; 8.6 TABLET ORAL at 20:22

## 2025-03-14 RX ADMIN — Medication: at 08:01

## 2025-03-14 RX ADMIN — APIXABAN 5 MG: 5 TABLET, FILM COATED ORAL at 08:49

## 2025-03-14 RX ADMIN — Medication 1 AMPULE: at 20:28

## 2025-03-14 RX ADMIN — IPRATROPIUM BROMIDE AND ALBUTEROL SULFATE 1 DOSE: 2.5; .5 SOLUTION RESPIRATORY (INHALATION) at 20:43

## 2025-03-14 RX ADMIN — Medication 4 ML: at 08:57

## 2025-03-14 RX ADMIN — HYDROMORPHONE HYDROCHLORIDE 2 MG: 2 TABLET ORAL at 18:32

## 2025-03-14 RX ADMIN — IPRATROPIUM BROMIDE AND ALBUTEROL SULFATE 1 DOSE: 2.5; .5 SOLUTION RESPIRATORY (INHALATION) at 13:19

## 2025-03-14 ASSESSMENT — PAIN DESCRIPTION - DESCRIPTORS
DESCRIPTORS: ACHING

## 2025-03-14 ASSESSMENT — PAIN SCALES - GENERAL
PAINLEVEL_OUTOF10: 3
PAINLEVEL_OUTOF10: 8
PAINLEVEL_OUTOF10: 0
PAINLEVEL_OUTOF10: 3
PAINLEVEL_OUTOF10: 3
PAINLEVEL_OUTOF10: 8
PAINLEVEL_OUTOF10: 7
PAINLEVEL_OUTOF10: 0

## 2025-03-14 ASSESSMENT — PAIN DESCRIPTION - LOCATION
LOCATION: HIP;LEG;GENERALIZED
LOCATION: HIP;LEG;GENERALIZED
LOCATION: HIP;GENERALIZED
LOCATION: HIP;GENERALIZED
LOCATION: GENERALIZED

## 2025-03-14 ASSESSMENT — PAIN DESCRIPTION - PAIN TYPE: TYPE: CHRONIC PAIN

## 2025-03-14 ASSESSMENT — PAIN DESCRIPTION - ORIENTATION
ORIENTATION: LEFT
ORIENTATION: LEFT

## 2025-03-14 ASSESSMENT — PAIN DESCRIPTION - FREQUENCY
FREQUENCY: INTERMITTENT
FREQUENCY: INTERMITTENT

## 2025-03-15 LAB
ANION GAP SERPL CALC-SCNC: 2 MMOL/L (ref 2–12)
BUN SERPL-MCNC: 14 MG/DL (ref 6–20)
BUN/CREAT SERPL: 18 (ref 12–20)
CALCIUM SERPL-MCNC: 9.6 MG/DL (ref 8.5–10.1)
CHLORIDE SERPL-SCNC: 96 MMOL/L (ref 97–108)
CO2 SERPL-SCNC: 38 MMOL/L (ref 21–32)
CREAT SERPL-MCNC: 0.78 MG/DL (ref 0.55–1.02)
ERYTHROCYTE [DISTWIDTH] IN BLOOD BY AUTOMATED COUNT: 14.6 % (ref 11.5–14.5)
GLUCOSE BLD STRIP.AUTO-MCNC: 118 MG/DL (ref 65–117)
GLUCOSE BLD STRIP.AUTO-MCNC: 129 MG/DL (ref 65–117)
GLUCOSE BLD STRIP.AUTO-MCNC: 150 MG/DL (ref 65–117)
GLUCOSE SERPL-MCNC: 85 MG/DL (ref 65–100)
HCT VFR BLD AUTO: 36.7 % (ref 35–47)
HGB BLD-MCNC: 10.5 G/DL (ref 11.5–16)
MAGNESIUM SERPL-MCNC: 2 MG/DL (ref 1.6–2.4)
MCH RBC QN AUTO: 28.3 PG (ref 26–34)
MCHC RBC AUTO-ENTMCNC: 28.6 G/DL (ref 30–36.5)
MCV RBC AUTO: 98.9 FL (ref 80–99)
NRBC # BLD: 0 K/UL (ref 0–0.01)
NRBC BLD-RTO: 0 PER 100 WBC
PHOSPHATE SERPL-MCNC: 2.5 MG/DL (ref 2.6–4.7)
PLATELET # BLD AUTO: 166 K/UL (ref 150–400)
PMV BLD AUTO: 9.5 FL (ref 8.9–12.9)
POTASSIUM SERPL-SCNC: 4 MMOL/L (ref 3.5–5.1)
RBC # BLD AUTO: 3.71 M/UL (ref 3.8–5.2)
SERVICE CMNT-IMP: ABNORMAL
SODIUM SERPL-SCNC: 136 MMOL/L (ref 136–145)
WBC # BLD AUTO: 7.6 K/UL (ref 3.6–11)

## 2025-03-15 PROCEDURE — 85027 COMPLETE CBC AUTOMATED: CPT

## 2025-03-15 PROCEDURE — 6370000000 HC RX 637 (ALT 250 FOR IP): Performed by: INTERNAL MEDICINE

## 2025-03-15 PROCEDURE — 82962 GLUCOSE BLOOD TEST: CPT

## 2025-03-15 PROCEDURE — 2580000003 HC RX 258: Performed by: HOSPITALIST

## 2025-03-15 PROCEDURE — 6370000000 HC RX 637 (ALT 250 FOR IP): Performed by: HOSPITALIST

## 2025-03-15 PROCEDURE — 80048 BASIC METABOLIC PNL TOTAL CA: CPT

## 2025-03-15 PROCEDURE — 2500000003 HC RX 250 WO HCPCS: Performed by: NURSE PRACTITIONER

## 2025-03-15 PROCEDURE — 2060000000 HC ICU INTERMEDIATE R&B

## 2025-03-15 PROCEDURE — 6370000000 HC RX 637 (ALT 250 FOR IP): Performed by: NURSE PRACTITIONER

## 2025-03-15 PROCEDURE — 94660 CPAP INITIATION&MGMT: CPT

## 2025-03-15 PROCEDURE — 2700000000 HC OXYGEN THERAPY PER DAY

## 2025-03-15 PROCEDURE — 84100 ASSAY OF PHOSPHORUS: CPT

## 2025-03-15 PROCEDURE — 6370000000 HC RX 637 (ALT 250 FOR IP): Performed by: STUDENT IN AN ORGANIZED HEALTH CARE EDUCATION/TRAINING PROGRAM

## 2025-03-15 PROCEDURE — 36415 COLL VENOUS BLD VENIPUNCTURE: CPT

## 2025-03-15 PROCEDURE — 6360000002 HC RX W HCPCS: Performed by: PHYSICIAN ASSISTANT

## 2025-03-15 PROCEDURE — 1100000000 HC RM PRIVATE

## 2025-03-15 PROCEDURE — 94640 AIRWAY INHALATION TREATMENT: CPT

## 2025-03-15 PROCEDURE — 83735 ASSAY OF MAGNESIUM: CPT

## 2025-03-15 PROCEDURE — 6360000002 HC RX W HCPCS: Performed by: HOSPITALIST

## 2025-03-15 RX ADMIN — Medication 1 AMPULE: at 09:11

## 2025-03-15 RX ADMIN — IPRATROPIUM BROMIDE AND ALBUTEROL SULFATE 1 DOSE: 2.5; .5 SOLUTION RESPIRATORY (INHALATION) at 07:51

## 2025-03-15 RX ADMIN — APIXABAN 5 MG: 5 TABLET, FILM COATED ORAL at 20:39

## 2025-03-15 RX ADMIN — SODIUM CHLORIDE, PRESERVATIVE FREE 10 ML: 5 INJECTION INTRAVENOUS at 20:39

## 2025-03-15 RX ADMIN — SENNOSIDES AND DOCUSATE SODIUM 2 TABLET: 50; 8.6 TABLET ORAL at 20:37

## 2025-03-15 RX ADMIN — Medication: at 09:11

## 2025-03-15 RX ADMIN — PANTOPRAZOLE SODIUM 40 MG: 40 TABLET, DELAYED RELEASE ORAL at 05:16

## 2025-03-15 RX ADMIN — METOPROLOL TARTRATE 12.5 MG: 25 TABLET, FILM COATED ORAL at 20:37

## 2025-03-15 RX ADMIN — METHADONE HYDROCHLORIDE 5 MG: 10 TABLET ORAL at 20:36

## 2025-03-15 RX ADMIN — ANASTROZOLE 1 MG: 1 TABLET, COATED ORAL at 09:10

## 2025-03-15 RX ADMIN — ARFORMOTEROL TARTRATE: 15 SOLUTION RESPIRATORY (INHALATION) at 07:58

## 2025-03-15 RX ADMIN — IPRATROPIUM BROMIDE AND ALBUTEROL SULFATE 1 DOSE: 2.5; .5 SOLUTION RESPIRATORY (INHALATION) at 14:37

## 2025-03-15 RX ADMIN — SODIUM CHLORIDE, PRESERVATIVE FREE 10 ML: 5 INJECTION INTRAVENOUS at 09:12

## 2025-03-15 RX ADMIN — ACETAMINOPHEN 650 MG: 325 TABLET ORAL at 18:24

## 2025-03-15 RX ADMIN — IPRATROPIUM BROMIDE AND ALBUTEROL SULFATE 1 DOSE: 2.5; .5 SOLUTION RESPIRATORY (INHALATION) at 21:00

## 2025-03-15 RX ADMIN — IPRATROPIUM BROMIDE AND ALBUTEROL SULFATE 1 DOSE: 2.5; .5 SOLUTION RESPIRATORY (INHALATION) at 01:10

## 2025-03-15 RX ADMIN — Medication 4 ML: at 01:13

## 2025-03-15 RX ADMIN — INSULIN GLARGINE 10 UNITS: 100 INJECTION, SOLUTION SUBCUTANEOUS at 09:11

## 2025-03-15 RX ADMIN — CETIRIZINE HYDROCHLORIDE 10 MG: 10 TABLET, FILM COATED ORAL at 20:37

## 2025-03-15 RX ADMIN — APIXABAN 5 MG: 5 TABLET, FILM COATED ORAL at 09:10

## 2025-03-15 RX ADMIN — POLYETHYLENE GLYCOL 3350 17 G: 17 POWDER, FOR SOLUTION ORAL at 09:10

## 2025-03-15 RX ADMIN — GUAIFENESIN 600 MG: 600 TABLET, EXTENDED RELEASE ORAL at 20:37

## 2025-03-15 RX ADMIN — HYDROMORPHONE HYDROCHLORIDE 2 MG: 2 TABLET ORAL at 05:16

## 2025-03-15 RX ADMIN — METHADONE HYDROCHLORIDE 5 MG: 10 TABLET ORAL at 09:10

## 2025-03-15 RX ADMIN — ACETYLCYSTEINE 600 MG: 200 INHALANT RESPIRATORY (INHALATION) at 01:11

## 2025-03-15 RX ADMIN — Medication: at 20:40

## 2025-03-15 RX ADMIN — ARFORMOTEROL TARTRATE: 15 SOLUTION RESPIRATORY (INHALATION) at 21:10

## 2025-03-15 ASSESSMENT — PAIN SCALES - GENERAL
PAINLEVEL_OUTOF10: 7
PAINLEVEL_OUTOF10: 0
PAINLEVEL_OUTOF10: 5
PAINLEVEL_OUTOF10: 0
PAINLEVEL_OUTOF10: 6

## 2025-03-15 ASSESSMENT — PAIN DESCRIPTION - LOCATION
LOCATION: BACK;FLANK;LEG
LOCATION: BACK;FLANK;LEG
LOCATION: BACK

## 2025-03-15 ASSESSMENT — PAIN DESCRIPTION - ORIENTATION: ORIENTATION: MID

## 2025-03-15 ASSESSMENT — PAIN DESCRIPTION - DESCRIPTORS
DESCRIPTORS: ACHING;SHARP
DESCRIPTORS: ACHING;SHARP
DESCRIPTORS: ACHING

## 2025-03-15 ASSESSMENT — PAIN - FUNCTIONAL ASSESSMENT: PAIN_FUNCTIONAL_ASSESSMENT: ACTIVITIES ARE NOT PREVENTED

## 2025-03-16 LAB
ANION GAP SERPL CALC-SCNC: 1 MMOL/L (ref 2–12)
BUN SERPL-MCNC: 12 MG/DL (ref 6–20)
BUN/CREAT SERPL: 12 (ref 12–20)
CALCIUM SERPL-MCNC: 9.2 MG/DL (ref 8.5–10.1)
CHLORIDE SERPL-SCNC: 97 MMOL/L (ref 97–108)
CO2 SERPL-SCNC: 38 MMOL/L (ref 21–32)
CREAT SERPL-MCNC: 0.98 MG/DL (ref 0.55–1.02)
GLUCOSE BLD STRIP.AUTO-MCNC: 114 MG/DL (ref 65–117)
GLUCOSE BLD STRIP.AUTO-MCNC: 118 MG/DL (ref 65–117)
GLUCOSE BLD STRIP.AUTO-MCNC: 153 MG/DL (ref 65–117)
GLUCOSE BLD STRIP.AUTO-MCNC: 207 MG/DL (ref 65–117)
GLUCOSE SERPL-MCNC: 117 MG/DL (ref 65–100)
POTASSIUM SERPL-SCNC: 4.1 MMOL/L (ref 3.5–5.1)
SERVICE CMNT-IMP: ABNORMAL
SERVICE CMNT-IMP: NORMAL
SODIUM SERPL-SCNC: 136 MMOL/L (ref 136–145)

## 2025-03-16 PROCEDURE — 6370000000 HC RX 637 (ALT 250 FOR IP): Performed by: STUDENT IN AN ORGANIZED HEALTH CARE EDUCATION/TRAINING PROGRAM

## 2025-03-16 PROCEDURE — 6370000000 HC RX 637 (ALT 250 FOR IP): Performed by: NURSE PRACTITIONER

## 2025-03-16 PROCEDURE — 94640 AIRWAY INHALATION TREATMENT: CPT

## 2025-03-16 PROCEDURE — 80048 BASIC METABOLIC PNL TOTAL CA: CPT

## 2025-03-16 PROCEDURE — 6370000000 HC RX 637 (ALT 250 FOR IP): Performed by: HOSPITALIST

## 2025-03-16 PROCEDURE — 1100000000 HC RM PRIVATE

## 2025-03-16 PROCEDURE — 6370000000 HC RX 637 (ALT 250 FOR IP): Performed by: INTERNAL MEDICINE

## 2025-03-16 PROCEDURE — 36415 COLL VENOUS BLD VENIPUNCTURE: CPT

## 2025-03-16 PROCEDURE — 2500000003 HC RX 250 WO HCPCS: Performed by: NURSE PRACTITIONER

## 2025-03-16 PROCEDURE — 82962 GLUCOSE BLOOD TEST: CPT

## 2025-03-16 PROCEDURE — 6360000002 HC RX W HCPCS: Performed by: PHYSICIAN ASSISTANT

## 2025-03-16 PROCEDURE — 2060000000 HC ICU INTERMEDIATE R&B

## 2025-03-16 RX ADMIN — ARFORMOTEROL TARTRATE: 15 SOLUTION RESPIRATORY (INHALATION) at 07:23

## 2025-03-16 RX ADMIN — ANASTROZOLE 1 MG: 1 TABLET, COATED ORAL at 09:31

## 2025-03-16 RX ADMIN — INSULIN GLARGINE 10 UNITS: 100 INJECTION, SOLUTION SUBCUTANEOUS at 09:30

## 2025-03-16 RX ADMIN — GUAIFENESIN 600 MG: 600 TABLET, EXTENDED RELEASE ORAL at 09:29

## 2025-03-16 RX ADMIN — ARFORMOTEROL TARTRATE: 15 SOLUTION RESPIRATORY (INHALATION) at 20:30

## 2025-03-16 RX ADMIN — SODIUM CHLORIDE, PRESERVATIVE FREE 10 ML: 5 INJECTION INTRAVENOUS at 20:44

## 2025-03-16 RX ADMIN — PANTOPRAZOLE SODIUM 40 MG: 40 TABLET, DELAYED RELEASE ORAL at 06:29

## 2025-03-16 RX ADMIN — METOPROLOL TARTRATE 12.5 MG: 25 TABLET, FILM COATED ORAL at 20:35

## 2025-03-16 RX ADMIN — METHADONE HYDROCHLORIDE 5 MG: 10 TABLET ORAL at 08:45

## 2025-03-16 RX ADMIN — POLYETHYLENE GLYCOL 3350 17 G: 17 POWDER, FOR SOLUTION ORAL at 09:30

## 2025-03-16 RX ADMIN — SENNOSIDES AND DOCUSATE SODIUM 2 TABLET: 50; 8.6 TABLET ORAL at 20:42

## 2025-03-16 RX ADMIN — IPRATROPIUM BROMIDE AND ALBUTEROL SULFATE 1 DOSE: 2.5; .5 SOLUTION RESPIRATORY (INHALATION) at 07:23

## 2025-03-16 RX ADMIN — APIXABAN 5 MG: 5 TABLET, FILM COATED ORAL at 09:28

## 2025-03-16 RX ADMIN — GUAIFENESIN 600 MG: 600 TABLET, EXTENDED RELEASE ORAL at 20:42

## 2025-03-16 RX ADMIN — METOPROLOL TARTRATE 12.5 MG: 25 TABLET, FILM COATED ORAL at 09:29

## 2025-03-16 RX ADMIN — IPRATROPIUM BROMIDE AND ALBUTEROL SULFATE 1 DOSE: 2.5; .5 SOLUTION RESPIRATORY (INHALATION) at 14:08

## 2025-03-16 RX ADMIN — METHADONE HYDROCHLORIDE 5 MG: 10 TABLET ORAL at 20:35

## 2025-03-16 RX ADMIN — INSULIN LISPRO 2 UNITS: 100 INJECTION, SOLUTION INTRAVENOUS; SUBCUTANEOUS at 20:59

## 2025-03-16 RX ADMIN — Medication: at 09:31

## 2025-03-16 RX ADMIN — IPRATROPIUM BROMIDE AND ALBUTEROL SULFATE 1 DOSE: 2.5; .5 SOLUTION RESPIRATORY (INHALATION) at 02:19

## 2025-03-16 RX ADMIN — SENNOSIDES AND DOCUSATE SODIUM 2 TABLET: 50; 8.6 TABLET ORAL at 09:29

## 2025-03-16 RX ADMIN — IPRATROPIUM BROMIDE AND ALBUTEROL SULFATE 1 DOSE: 2.5; .5 SOLUTION RESPIRATORY (INHALATION) at 20:25

## 2025-03-16 RX ADMIN — APIXABAN 5 MG: 5 TABLET, FILM COATED ORAL at 20:42

## 2025-03-16 RX ADMIN — CETIRIZINE HYDROCHLORIDE 10 MG: 10 TABLET, FILM COATED ORAL at 20:34

## 2025-03-16 RX ADMIN — Medication: at 20:43

## 2025-03-16 RX ADMIN — ACETAMINOPHEN 650 MG: 325 TABLET ORAL at 06:30

## 2025-03-16 ASSESSMENT — PAIN - FUNCTIONAL ASSESSMENT: PAIN_FUNCTIONAL_ASSESSMENT: ACTIVITIES ARE NOT PREVENTED

## 2025-03-16 ASSESSMENT — PAIN DESCRIPTION - ORIENTATION
ORIENTATION: MID
ORIENTATION: MID

## 2025-03-16 ASSESSMENT — PAIN DESCRIPTION - DESCRIPTORS
DESCRIPTORS: ACHING
DESCRIPTORS: ACHING;SHARP
DESCRIPTORS: ACHING
DESCRIPTORS: ACHING

## 2025-03-16 ASSESSMENT — PAIN DESCRIPTION - LOCATION
LOCATION: BACK
LOCATION: BACK
LOCATION: SACRUM
LOCATION: BACK;FLANK
LOCATION: ABDOMEN;FLANK;BACK

## 2025-03-16 ASSESSMENT — PAIN SCALES - GENERAL
PAINLEVEL_OUTOF10: 3
PAINLEVEL_OUTOF10: 3
PAINLEVEL_OUTOF10: 5
PAINLEVEL_OUTOF10: 3
PAINLEVEL_OUTOF10: 5
PAINLEVEL_OUTOF10: 3

## 2025-03-16 ASSESSMENT — PAIN DESCRIPTION - ONSET: ONSET: GRADUAL

## 2025-03-16 ASSESSMENT — PAIN DESCRIPTION - FREQUENCY: FREQUENCY: CONTINUOUS

## 2025-03-16 ASSESSMENT — PAIN DESCRIPTION - PAIN TYPE: TYPE: CHRONIC PAIN

## 2025-03-17 LAB
GLUCOSE BLD STRIP.AUTO-MCNC: 101 MG/DL (ref 65–117)
GLUCOSE BLD STRIP.AUTO-MCNC: 125 MG/DL (ref 65–117)
GLUCOSE BLD STRIP.AUTO-MCNC: 139 MG/DL (ref 65–117)
GLUCOSE BLD STRIP.AUTO-MCNC: 201 MG/DL (ref 65–117)
SERVICE CMNT-IMP: ABNORMAL
SERVICE CMNT-IMP: NORMAL

## 2025-03-17 PROCEDURE — 2500000003 HC RX 250 WO HCPCS: Performed by: NURSE PRACTITIONER

## 2025-03-17 PROCEDURE — 6370000000 HC RX 637 (ALT 250 FOR IP): Performed by: NURSE PRACTITIONER

## 2025-03-17 PROCEDURE — 94660 CPAP INITIATION&MGMT: CPT

## 2025-03-17 PROCEDURE — 1100000000 HC RM PRIVATE

## 2025-03-17 PROCEDURE — 2700000000 HC OXYGEN THERAPY PER DAY

## 2025-03-17 PROCEDURE — 6370000000 HC RX 637 (ALT 250 FOR IP): Performed by: HOSPITALIST

## 2025-03-17 PROCEDURE — 94640 AIRWAY INHALATION TREATMENT: CPT

## 2025-03-17 PROCEDURE — 6370000000 HC RX 637 (ALT 250 FOR IP): Performed by: STUDENT IN AN ORGANIZED HEALTH CARE EDUCATION/TRAINING PROGRAM

## 2025-03-17 PROCEDURE — 82962 GLUCOSE BLOOD TEST: CPT

## 2025-03-17 PROCEDURE — 6370000000 HC RX 637 (ALT 250 FOR IP): Performed by: INTERNAL MEDICINE

## 2025-03-17 PROCEDURE — 6360000002 HC RX W HCPCS: Performed by: PHYSICIAN ASSISTANT

## 2025-03-17 RX ADMIN — METHADONE HYDROCHLORIDE 5 MG: 10 TABLET ORAL at 08:50

## 2025-03-17 RX ADMIN — ARFORMOTEROL TARTRATE: 15 SOLUTION RESPIRATORY (INHALATION) at 20:35

## 2025-03-17 RX ADMIN — APIXABAN 5 MG: 5 TABLET, FILM COATED ORAL at 15:02

## 2025-03-17 RX ADMIN — INSULIN LISPRO 2 UNITS: 100 INJECTION, SOLUTION INTRAVENOUS; SUBCUTANEOUS at 08:49

## 2025-03-17 RX ADMIN — ARFORMOTEROL TARTRATE: 15 SOLUTION RESPIRATORY (INHALATION) at 08:14

## 2025-03-17 RX ADMIN — GUAIFENESIN 600 MG: 600 TABLET, EXTENDED RELEASE ORAL at 21:13

## 2025-03-17 RX ADMIN — INSULIN GLARGINE 10 UNITS: 100 INJECTION, SOLUTION SUBCUTANEOUS at 08:49

## 2025-03-17 RX ADMIN — SODIUM CHLORIDE, PRESERVATIVE FREE 10 ML: 5 INJECTION INTRAVENOUS at 21:14

## 2025-03-17 RX ADMIN — IPRATROPIUM BROMIDE AND ALBUTEROL SULFATE 1 DOSE: 2.5; .5 SOLUTION RESPIRATORY (INHALATION) at 02:25

## 2025-03-17 RX ADMIN — IPRATROPIUM BROMIDE AND ALBUTEROL SULFATE 1 DOSE: 2.5; .5 SOLUTION RESPIRATORY (INHALATION) at 14:37

## 2025-03-17 RX ADMIN — METHADONE HYDROCHLORIDE 5 MG: 10 TABLET ORAL at 21:13

## 2025-03-17 RX ADMIN — SENNOSIDES AND DOCUSATE SODIUM 2 TABLET: 50; 8.6 TABLET ORAL at 08:55

## 2025-03-17 RX ADMIN — METOPROLOL TARTRATE 12.5 MG: 25 TABLET, FILM COATED ORAL at 21:13

## 2025-03-17 RX ADMIN — SENNOSIDES AND DOCUSATE SODIUM 2 TABLET: 50; 8.6 TABLET ORAL at 21:12

## 2025-03-17 RX ADMIN — ANASTROZOLE 1 MG: 1 TABLET, COATED ORAL at 08:52

## 2025-03-17 RX ADMIN — Medication: at 08:53

## 2025-03-17 RX ADMIN — IPRATROPIUM BROMIDE AND ALBUTEROL SULFATE 1 DOSE: 2.5; .5 SOLUTION RESPIRATORY (INHALATION) at 20:29

## 2025-03-17 RX ADMIN — IPRATROPIUM BROMIDE AND ALBUTEROL SULFATE 1 DOSE: 2.5; .5 SOLUTION RESPIRATORY (INHALATION) at 08:09

## 2025-03-17 RX ADMIN — CETIRIZINE HYDROCHLORIDE 10 MG: 10 TABLET, FILM COATED ORAL at 21:13

## 2025-03-17 RX ADMIN — APIXABAN 5 MG: 5 TABLET, FILM COATED ORAL at 21:12

## 2025-03-17 RX ADMIN — METOPROLOL TARTRATE 12.5 MG: 25 TABLET, FILM COATED ORAL at 08:53

## 2025-03-17 RX ADMIN — SODIUM CHLORIDE, PRESERVATIVE FREE 10 ML: 5 INJECTION INTRAVENOUS at 08:52

## 2025-03-17 RX ADMIN — Medication: at 21:13

## 2025-03-17 RX ADMIN — PANTOPRAZOLE SODIUM 40 MG: 40 TABLET, DELAYED RELEASE ORAL at 06:42

## 2025-03-17 RX ADMIN — GUAIFENESIN 600 MG: 600 TABLET, EXTENDED RELEASE ORAL at 08:50

## 2025-03-17 ASSESSMENT — PAIN SCALES - GENERAL
PAINLEVEL_OUTOF10: 0
PAINLEVEL_OUTOF10: 6
PAINLEVEL_OUTOF10: 0
PAINLEVEL_OUTOF10: 0

## 2025-03-17 ASSESSMENT — PAIN DESCRIPTION - ORIENTATION: ORIENTATION: RIGHT;LEFT

## 2025-03-17 ASSESSMENT — PAIN DESCRIPTION - DESCRIPTORS: DESCRIPTORS: ACHING

## 2025-03-17 ASSESSMENT — PAIN DESCRIPTION - LOCATION: LOCATION: BACK

## 2025-03-18 LAB
GLUCOSE BLD STRIP.AUTO-MCNC: 108 MG/DL (ref 65–117)
GLUCOSE BLD STRIP.AUTO-MCNC: 111 MG/DL (ref 65–117)
GLUCOSE BLD STRIP.AUTO-MCNC: 125 MG/DL (ref 65–117)
GLUCOSE BLD STRIP.AUTO-MCNC: 98 MG/DL (ref 65–117)
SERVICE CMNT-IMP: ABNORMAL
SERVICE CMNT-IMP: NORMAL

## 2025-03-18 PROCEDURE — 94640 AIRWAY INHALATION TREATMENT: CPT

## 2025-03-18 PROCEDURE — 6370000000 HC RX 637 (ALT 250 FOR IP): Performed by: NURSE PRACTITIONER

## 2025-03-18 PROCEDURE — 82962 GLUCOSE BLOOD TEST: CPT

## 2025-03-18 PROCEDURE — 1100000000 HC RM PRIVATE

## 2025-03-18 PROCEDURE — 6370000000 HC RX 637 (ALT 250 FOR IP): Performed by: INTERNAL MEDICINE

## 2025-03-18 PROCEDURE — 2500000003 HC RX 250 WO HCPCS: Performed by: NURSE PRACTITIONER

## 2025-03-18 PROCEDURE — 6360000002 HC RX W HCPCS: Performed by: PHYSICIAN ASSISTANT

## 2025-03-18 PROCEDURE — 6370000000 HC RX 637 (ALT 250 FOR IP): Performed by: HOSPITALIST

## 2025-03-18 PROCEDURE — 6370000000 HC RX 637 (ALT 250 FOR IP): Performed by: STUDENT IN AN ORGANIZED HEALTH CARE EDUCATION/TRAINING PROGRAM

## 2025-03-18 PROCEDURE — 2700000000 HC OXYGEN THERAPY PER DAY

## 2025-03-18 RX ADMIN — HYDROMORPHONE HYDROCHLORIDE 2 MG: 2 TABLET ORAL at 13:17

## 2025-03-18 RX ADMIN — Medication: at 21:58

## 2025-03-18 RX ADMIN — PANTOPRAZOLE SODIUM 40 MG: 40 TABLET, DELAYED RELEASE ORAL at 09:55

## 2025-03-18 RX ADMIN — APIXABAN 5 MG: 5 TABLET, FILM COATED ORAL at 09:21

## 2025-03-18 RX ADMIN — METOPROLOL TARTRATE 12.5 MG: 25 TABLET, FILM COATED ORAL at 21:58

## 2025-03-18 RX ADMIN — APIXABAN 5 MG: 5 TABLET, FILM COATED ORAL at 22:00

## 2025-03-18 RX ADMIN — GUAIFENESIN 600 MG: 600 TABLET, EXTENDED RELEASE ORAL at 09:21

## 2025-03-18 RX ADMIN — GUAIFENESIN 600 MG: 600 TABLET, EXTENDED RELEASE ORAL at 21:59

## 2025-03-18 RX ADMIN — ARFORMOTEROL TARTRATE: 15 SOLUTION RESPIRATORY (INHALATION) at 08:30

## 2025-03-18 RX ADMIN — IPRATROPIUM BROMIDE AND ALBUTEROL SULFATE 1 DOSE: 2.5; .5 SOLUTION RESPIRATORY (INHALATION) at 08:25

## 2025-03-18 RX ADMIN — CETIRIZINE HYDROCHLORIDE 10 MG: 10 TABLET, FILM COATED ORAL at 21:59

## 2025-03-18 RX ADMIN — SODIUM CHLORIDE, PRESERVATIVE FREE 10 ML: 5 INJECTION INTRAVENOUS at 09:51

## 2025-03-18 RX ADMIN — SODIUM CHLORIDE, PRESERVATIVE FREE 10 ML: 5 INJECTION INTRAVENOUS at 22:07

## 2025-03-18 RX ADMIN — IPRATROPIUM BROMIDE AND ALBUTEROL SULFATE 1 DOSE: 2.5; .5 SOLUTION RESPIRATORY (INHALATION) at 16:22

## 2025-03-18 RX ADMIN — METHADONE HYDROCHLORIDE 5 MG: 10 TABLET ORAL at 22:00

## 2025-03-18 RX ADMIN — ANASTROZOLE 1 MG: 1 TABLET, COATED ORAL at 09:25

## 2025-03-18 RX ADMIN — Medication: at 09:25

## 2025-03-18 RX ADMIN — INSULIN GLARGINE 10 UNITS: 100 INJECTION, SOLUTION SUBCUTANEOUS at 09:21

## 2025-03-18 RX ADMIN — METHADONE HYDROCHLORIDE 5 MG: 10 TABLET ORAL at 09:22

## 2025-03-18 RX ADMIN — METOPROLOL TARTRATE 12.5 MG: 25 TABLET, FILM COATED ORAL at 09:24

## 2025-03-18 ASSESSMENT — PAIN SCALES - GENERAL
PAINLEVEL_OUTOF10: 0
PAINLEVEL_OUTOF10: 5
PAINLEVEL_OUTOF10: 0
PAINLEVEL_OUTOF10: 7

## 2025-03-18 ASSESSMENT — PAIN DESCRIPTION - DESCRIPTORS
DESCRIPTORS: ACHING
DESCRIPTORS: SHARP;BURNING

## 2025-03-18 ASSESSMENT — PAIN DESCRIPTION - LOCATION
LOCATION: BACK;FOOT
LOCATION: BACK;FOOT;BUTTOCKS

## 2025-03-18 ASSESSMENT — PAIN DESCRIPTION - ORIENTATION
ORIENTATION: LEFT;RIGHT
ORIENTATION: RIGHT;LEFT

## 2025-03-19 LAB
GLUCOSE BLD STRIP.AUTO-MCNC: 101 MG/DL (ref 65–117)
GLUCOSE BLD STRIP.AUTO-MCNC: 129 MG/DL (ref 65–117)
GLUCOSE BLD STRIP.AUTO-MCNC: 129 MG/DL (ref 65–117)
GLUCOSE BLD STRIP.AUTO-MCNC: 75 MG/DL (ref 65–117)
GLUCOSE BLD STRIP.AUTO-MCNC: 87 MG/DL (ref 65–117)
SERVICE CMNT-IMP: ABNORMAL
SERVICE CMNT-IMP: ABNORMAL
SERVICE CMNT-IMP: NORMAL

## 2025-03-19 PROCEDURE — 6370000000 HC RX 637 (ALT 250 FOR IP): Performed by: STUDENT IN AN ORGANIZED HEALTH CARE EDUCATION/TRAINING PROGRAM

## 2025-03-19 PROCEDURE — 6370000000 HC RX 637 (ALT 250 FOR IP): Performed by: NURSE PRACTITIONER

## 2025-03-19 PROCEDURE — 1100000000 HC RM PRIVATE

## 2025-03-19 PROCEDURE — 6370000000 HC RX 637 (ALT 250 FOR IP): Performed by: INTERNAL MEDICINE

## 2025-03-19 PROCEDURE — 94660 CPAP INITIATION&MGMT: CPT

## 2025-03-19 PROCEDURE — 2700000000 HC OXYGEN THERAPY PER DAY

## 2025-03-19 PROCEDURE — 2500000003 HC RX 250 WO HCPCS: Performed by: NURSE PRACTITIONER

## 2025-03-19 PROCEDURE — 94640 AIRWAY INHALATION TREATMENT: CPT

## 2025-03-19 PROCEDURE — 6360000002 HC RX W HCPCS: Performed by: PHYSICIAN ASSISTANT

## 2025-03-19 PROCEDURE — 6370000000 HC RX 637 (ALT 250 FOR IP): Performed by: HOSPITALIST

## 2025-03-19 PROCEDURE — 82962 GLUCOSE BLOOD TEST: CPT

## 2025-03-19 RX ADMIN — Medication: at 22:07

## 2025-03-19 RX ADMIN — SODIUM CHLORIDE, PRESERVATIVE FREE 10 ML: 5 INJECTION INTRAVENOUS at 11:08

## 2025-03-19 RX ADMIN — GUAIFENESIN 600 MG: 600 TABLET, EXTENDED RELEASE ORAL at 10:50

## 2025-03-19 RX ADMIN — APIXABAN 5 MG: 5 TABLET, FILM COATED ORAL at 10:50

## 2025-03-19 RX ADMIN — IPRATROPIUM BROMIDE AND ALBUTEROL SULFATE 1 DOSE: 2.5; .5 SOLUTION RESPIRATORY (INHALATION) at 15:59

## 2025-03-19 RX ADMIN — ANASTROZOLE 1 MG: 1 TABLET, COATED ORAL at 10:50

## 2025-03-19 RX ADMIN — Medication: at 11:07

## 2025-03-19 RX ADMIN — SODIUM CHLORIDE, PRESERVATIVE FREE 10 ML: 5 INJECTION INTRAVENOUS at 22:07

## 2025-03-19 RX ADMIN — PANTOPRAZOLE SODIUM 40 MG: 40 TABLET, DELAYED RELEASE ORAL at 08:12

## 2025-03-19 RX ADMIN — IPRATROPIUM BROMIDE AND ALBUTEROL SULFATE 1 DOSE: 2.5; .5 SOLUTION RESPIRATORY (INHALATION) at 08:18

## 2025-03-19 RX ADMIN — HYDROMORPHONE HYDROCHLORIDE 2 MG: 2 TABLET ORAL at 12:56

## 2025-03-19 RX ADMIN — APIXABAN 5 MG: 5 TABLET, FILM COATED ORAL at 22:06

## 2025-03-19 RX ADMIN — ARFORMOTEROL TARTRATE: 15 SOLUTION RESPIRATORY (INHALATION) at 08:23

## 2025-03-19 RX ADMIN — HYDROMORPHONE HYDROCHLORIDE 2 MG: 2 TABLET ORAL at 22:27

## 2025-03-19 RX ADMIN — METOPROLOL TARTRATE 12.5 MG: 25 TABLET, FILM COATED ORAL at 22:06

## 2025-03-19 RX ADMIN — ARFORMOTEROL TARTRATE: 15 SOLUTION RESPIRATORY (INHALATION) at 21:54

## 2025-03-19 RX ADMIN — ARFORMOTEROL TARTRATE: 15 SOLUTION RESPIRATORY (INHALATION) at 00:40

## 2025-03-19 RX ADMIN — IPRATROPIUM BROMIDE AND ALBUTEROL SULFATE 1 DOSE: 2.5; .5 SOLUTION RESPIRATORY (INHALATION) at 21:54

## 2025-03-19 RX ADMIN — METHADONE HYDROCHLORIDE 5 MG: 10 TABLET ORAL at 22:03

## 2025-03-19 RX ADMIN — CETIRIZINE HYDROCHLORIDE 10 MG: 10 TABLET, FILM COATED ORAL at 22:07

## 2025-03-19 RX ADMIN — SENNOSIDES AND DOCUSATE SODIUM 2 TABLET: 50; 8.6 TABLET ORAL at 22:05

## 2025-03-19 RX ADMIN — GUAIFENESIN 600 MG: 600 TABLET, EXTENDED RELEASE ORAL at 22:07

## 2025-03-19 RX ADMIN — METHADONE HYDROCHLORIDE 5 MG: 10 TABLET ORAL at 08:12

## 2025-03-19 RX ADMIN — IPRATROPIUM BROMIDE AND ALBUTEROL SULFATE 1 DOSE: 2.5; .5 SOLUTION RESPIRATORY (INHALATION) at 00:40

## 2025-03-19 ASSESSMENT — PAIN SCALES - GENERAL
PAINLEVEL_OUTOF10: 5
PAINLEVEL_OUTOF10: 7
PAINLEVEL_OUTOF10: 5
PAINLEVEL_OUTOF10: 0
PAINLEVEL_OUTOF10: 0
PAINLEVEL_OUTOF10: 2
PAINLEVEL_OUTOF10: 2
PAINLEVEL_OUTOF10: 0

## 2025-03-19 ASSESSMENT — PAIN DESCRIPTION - LOCATION
LOCATION: SACRUM
LOCATION: BACK;BUTTOCKS;LEG
LOCATION: SACRUM

## 2025-03-19 ASSESSMENT — PAIN DESCRIPTION - ORIENTATION
ORIENTATION: MID
ORIENTATION: MID

## 2025-03-19 ASSESSMENT — PAIN DESCRIPTION - DESCRIPTORS: DESCRIPTORS: DISCOMFORT

## 2025-03-20 VITALS
SYSTOLIC BLOOD PRESSURE: 117 MMHG | DIASTOLIC BLOOD PRESSURE: 65 MMHG | HEART RATE: 102 BPM | WEIGHT: 293 LBS | BODY MASS INDEX: 48.82 KG/M2 | OXYGEN SATURATION: 91 % | TEMPERATURE: 97.5 F | RESPIRATION RATE: 18 BRPM | HEIGHT: 65 IN

## 2025-03-20 LAB
GLUCOSE BLD STRIP.AUTO-MCNC: 122 MG/DL (ref 65–117)
GLUCOSE BLD STRIP.AUTO-MCNC: 138 MG/DL (ref 65–117)
SERVICE CMNT-IMP: ABNORMAL
SERVICE CMNT-IMP: ABNORMAL

## 2025-03-20 PROCEDURE — 2700000000 HC OXYGEN THERAPY PER DAY

## 2025-03-20 PROCEDURE — 6370000000 HC RX 637 (ALT 250 FOR IP): Performed by: NURSE PRACTITIONER

## 2025-03-20 PROCEDURE — 94640 AIRWAY INHALATION TREATMENT: CPT

## 2025-03-20 PROCEDURE — 6370000000 HC RX 637 (ALT 250 FOR IP): Performed by: INTERNAL MEDICINE

## 2025-03-20 PROCEDURE — 2500000003 HC RX 250 WO HCPCS: Performed by: NURSE PRACTITIONER

## 2025-03-20 PROCEDURE — 6360000002 HC RX W HCPCS: Performed by: PHYSICIAN ASSISTANT

## 2025-03-20 PROCEDURE — 82962 GLUCOSE BLOOD TEST: CPT

## 2025-03-20 PROCEDURE — 6370000000 HC RX 637 (ALT 250 FOR IP): Performed by: STUDENT IN AN ORGANIZED HEALTH CARE EDUCATION/TRAINING PROGRAM

## 2025-03-20 PROCEDURE — 94660 CPAP INITIATION&MGMT: CPT

## 2025-03-20 PROCEDURE — 6370000000 HC RX 637 (ALT 250 FOR IP): Performed by: HOSPITALIST

## 2025-03-20 RX ADMIN — ANASTROZOLE 1 MG: 1 TABLET, COATED ORAL at 08:56

## 2025-03-20 RX ADMIN — GUAIFENESIN 600 MG: 600 TABLET, EXTENDED RELEASE ORAL at 08:53

## 2025-03-20 RX ADMIN — IPRATROPIUM BROMIDE AND ALBUTEROL SULFATE 1 DOSE: 2.5; .5 SOLUTION RESPIRATORY (INHALATION) at 07:24

## 2025-03-20 RX ADMIN — METHADONE HYDROCHLORIDE 5 MG: 10 TABLET ORAL at 08:54

## 2025-03-20 RX ADMIN — SODIUM CHLORIDE, PRESERVATIVE FREE 10 ML: 5 INJECTION INTRAVENOUS at 08:56

## 2025-03-20 RX ADMIN — Medication: at 08:55

## 2025-03-20 RX ADMIN — APIXABAN 5 MG: 5 TABLET, FILM COATED ORAL at 08:53

## 2025-03-20 RX ADMIN — IPRATROPIUM BROMIDE AND ALBUTEROL SULFATE 1 DOSE: 2.5; .5 SOLUTION RESPIRATORY (INHALATION) at 05:35

## 2025-03-20 RX ADMIN — PANTOPRAZOLE SODIUM 40 MG: 40 TABLET, DELAYED RELEASE ORAL at 08:54

## 2025-03-20 RX ADMIN — METOPROLOL TARTRATE 12.5 MG: 25 TABLET, FILM COATED ORAL at 08:54

## 2025-03-20 RX ADMIN — SENNOSIDES AND DOCUSATE SODIUM 2 TABLET: 50; 8.6 TABLET ORAL at 08:53

## 2025-03-20 RX ADMIN — INSULIN GLARGINE 10 UNITS: 100 INJECTION, SOLUTION SUBCUTANEOUS at 08:55

## 2025-03-20 RX ADMIN — ARFORMOTEROL TARTRATE: 15 SOLUTION RESPIRATORY (INHALATION) at 07:31

## 2025-03-20 ASSESSMENT — PAIN SCALES - GENERAL
PAINLEVEL_OUTOF10: 0

## 2025-03-20 NOTE — PALLIATIVE CARE DISCHARGE
Goals of Care/Treatment Preferences    The Palliative Medicine team was consulted as part of your/your loved one's care in the hospital. Our team is a supportive service; we strive to relieve suffering and improve quality of life.    We reviewed advance care planning information, which includes the following:    Primary Decision Maker: Yunior Mane - Child - [phone]    Secondary Decision Maker: Ariana Mnae (Janae) - Brother/Sister - [phone]    Supplemental (Other) Decision Maker: Flora Painter (Geraldo) (Sister-in-Law) - Other - [phone]    We reviewed / discussed your code status as:   Code Status: DNR           “DNR” means do NOT perform CPR in the event of cardiac arrest.        Because of the importance of this information, we are providing you with a printed copy to share with other healthcare providers after this hospitalization is complete.  Thank you for including Palliative team in your care, Ms. Lopez.       Meaghan Lin, [facility]  Palliative Medicine [phone]

## 2025-03-20 NOTE — WOUND CARE
Wound care nurse re-consult for telma-anal skin breakdown.    53 y/o female admitted with hypercapnic respiratory failure and metastatic breast CA. Discharging today on hospice.    Past Medical History:   Diagnosis Date    Arthritis     Asthma     Breast lump     CAD (coronary artery disease)     Cancer (HCC)     breast cancer    Congestive heart failure (HCC)     COPD (chronic obstructive pulmonary disease) (HCC)     Diabetes (HCC)     Hypertension     Morbid obesity with BMI of 70 and over, adult     NSTEMI (non-ST elevated myocardial infarction) (HCC) 2012    NSTEMI (non-ST elevated myocardial infarction) (HCC)     SVT (supraventricular tachycardia)      Past Surgical History:   Procedure Laterality Date     SECTION      ORTHOPEDIC SURGERY      OTHER SURGICAL HISTORY      cyst removed from back    IN UNLISTED PROCEDURE CARDIAC SURGERY      Stents    US BREAST BIOPSY W LOC DEVICE 1ST LESION RIGHT Right 2018    US BREAST NEEDLE BIOPSY RIGHT 2018 [facility] RAD US     Patient incontinent of urine and stool. Perianal skin breakdown for incontinence.    Labia skin breakdown from incontinence      Staff have been using Venelex/BPCO ointment of telma-anal/buttocks skin breakdown.  nurse d/c'd the Venelex/BPCO ointment and ordered ES Desitin ordered.    Patient being d/c'd to home with Hospice today.  nurse talked with staff nurse on PCU about change in orders.    MILLER GUAJARDO RN, CWON        
also.      Recommend:    BLE and feet: daily wash legs with soap and water and while still moist apply Lac-Hydrin lotion to dry, scaly skin.    Sacrum buttocks and perineum: BID and PRN soiling, gently wash with soap and water, pat dry and apply zinc oxide cream (orange tube) to skin breakdown.    Float heels    MILLER GUAJARDO RN, CWON

## 2025-03-20 NOTE — DISCHARGE SUMMARY
Discharge Summary    Name: Jesisca Mane  [id number]  YOB: 1970 (Age: 54 y.o.)   Date of Admission: 2/22/2025  Date of Discharge: 3/20/2025  Attending Physician: No att. providers found    Discharge Diagnosis:   Acute on chronic respiratory failure with hypoxia and hypercapnia POA  Obesity hypoventilation syndrome  Acute metabolic encephalopathy-now improved  Metastatic breast cancer-primary diagnosis for hospice admission  Type 2 diabetes mellitus  Morbid obesity BMI 51.03  History of pulmonary embolism  Severe deconditioned status  Pain due to metastatic cancer  Chronic opioid dependence for cancer related pain    Consultations:  IP CONSULT TO PALLIATIVE CARE  IP CONSULT TO PULMONOLOGY  IP CONSULT TO CASE MANAGEMENT  IP CONSULT TO CASE MANAGEMENT  IP CONSULT TO CASE MANAGEMENT  IP CONSULT TO PALLIATIVE CARE  IP CONSULT TO CASE MANAGEMENT  IP CONSULT TO HOSPICE      Brief Admission History/Reason for Admission Per Good [place] MD:   benton Mane is a 52 yo female with a PMH of widely metastatic breast cancer on anastrazole, COPD w chronic hypoxia on 5L/min NC, HFpEF, TERRA non-compliant with CPAP, chronic pain on methadone, Hx of PE on eliquis. She presented to the ED at [facility] with SOB, weakness and fatigue. She was just discharged from [facility] on 2/10 after being treated for flu c/b bacterial PNA with CT findings of subtotal LLL atelectasis. Upon presentation today, CXR shows new pleural effusion and remonstration of L sided atelectasis.  She was hypercapnic, but conversant 7.16/95/39 and placed on BiPAP.  She is being admitted to the ICU.     Regarding breast cancer history, she underwent radiotherapy for bony mets, but it appears has been largely lost to follow-up.  The last note from rad-onc in 2023 notes that she was not following up with medical oncology.  She was unable to name her oncologist and said she has \"not talked to him in a while\".  Her son was unable to

## 2025-03-20 NOTE — CARE COORDINATION
03/04/25 1349   Avoidable Days   Payor DME Auth       
   03/05/25 0844   Avoidable Days   Community/External       Equipment (DME) availability or delivery       
CM Note: Noted Hospice order and reference to [facility]. Placed referral to them. Doctor Wyatt mentioned he will talk with family on Sunday when family gets here. Nursing will need to call him.    3:48. Pm I talked with [facility] rep and they will follow up tomorrow after Dr. Schneider talks with family.  English Lawrence MCDOWELL CM [id number]  
Pt clear from CM standpoint - [facility] transport @ 1200PM.    Transition of Care Plan:    RUR: 23% \"high risk\"  Prior Level of Functioning: Needs assistance with ADL's   Disposition: Home with [facility]   RAY: 3/20  If SNF or IPR: Date FOC offered: N/A  Follow up appointments: PCP/Specialists as indicated  DME needed: Bipap - [facility], [facility]   Transportation at discharge: [facility] transport @ 1200PM  IM/IMM Medicare/ letter given: 2nd IM given by OMAR on 3/18  Is patient a [place] and connected with VA? No  Caregiver Contact: Ariana Mane (son), [phone]  Discharge Caregiver contacted prior to discharge? Yes  Care Conference needed? Not at this time   Barriers to discharge: None     1546 PM: Post d/c note - OMAR received a call from pt sister and [facility] nurse noting that the [employer] bipap does not have the O2 setting that pt needs as pt is on 7L of O2. OMAR contacted [facility] rep (Esha) at [phone] who noted that they do not have bipap devices with the O2 setting as they service palliative care pt's but not hospice pt's. OMAR explained to Esha that CM inquired if Ele with [facility] needed an updated bipap order to which Ele noted she did not. OMAR explained that [facility] gave CM the okay to proceed with [facility] as they were in the process of getting a contract with [facility] due to [employer] (the DME company that [facility] uses) not having any bipap devices readily available. Esha with [facility] requested the [facility] nurses phone number to discuss this information, OMAR provided Esha with Italia from [facility]' phone number. Esha called OMAR back explaining that she had talked to Italia and explained that no contract was ever received, but Esha was reaching out to her director to see what can be done. OMAR received a call at 1640PM from [facility] nurse noting that they are working to get pt an adapter for the bipap machine.     1123 AM: Pt received bipap at the 
Transition of Care Plan:     RUR: 24% High RUR    Prior Level of Functioning: Assistance with the following:;Bathing;Dressing;Toileting;Mobility     Disposition: Home with [facility].   RAY: 3/17/25  3:21 PM   OMAR received a call from Tejalmicheline Lucas with [facility]: [phone] and he indicated that he had gotten a order for hospice yesterday but wanted to make sure that Pt/family were ready for conversation.     CM talked to Hospitallist and he indicated that he was having a conversation with all family at 3 PM to discuss case. Hospitalist instructed me to let Jag know to proceed with hospice consult and work towards DC in 1-2 days.      CM called Jag back and provided Son, Yunior Mane: [phone] phone number for him to reach out to this afternoon.     Unit CM will reach out to family and Tejalck tomorrow to determine next steps in DC plan.      If SNF or IPR: Date FOC offered: NA  Date FOC received: N/A  Accepting facility: N/A  Date authorization started with reference number: N/A  Date authorization received and expires: N/A    Follow up appointments: pcp/specialist   DME needed: BIPAP thru [facility] and a karmen lift through [place] Phone: [phone] and [email or web address]   - will need to see what DME Hospice needs to set up prior to DC.   Transportation at discharge: [facility] need is anticipated  IM/IMM Medicare/ letter given: 2nd IM 3/5 and will need to be delivered again prior to DC.   Is patient a  and connected with VA? na              If yes, was [place] transfer form completed and VA notified? na  Caregiver Contact: Flora Painter (Geraldo) (Sister-in-Law) (Other) [phone]   Discharge Caregiver contacted prior to discharge? contacted  Care Conference needed? no  Barriers to discharge: Hospice Set up.     CM will continue to monitor discharge plan.     Marion Drew CM  [phone]  
Transition of Care Plan:     RUR: 24% High RUR  Prior Level of Functioning: Assistance with the following:;Bathing;Dressing;Toileting;Mobility   Disposition: home with home health [facility] [employer] [facility] Phone: [phone] soc within 24 hours. This may change since pt transferred to ccu 3/10/25  RAY: 3/16/25  If SNF or IPR: Date FOC offered: NA  Date FOC received: N/A  Accepting facility: N/A  Date authorization started with reference number: N/A  Date authorization received and expires: N/A  Follow up appointments: pcp/specialist   DME needed: BIPAP and a karmen lift through [place] Phone: [phone] and [email or web address]  Transportation at discharge: [facility] need is anticipated  IM/IMM Medicare/ letter given: 2nd IM 3/5  Is patient a  and connected with VA? na              If yes, was [place] transfer form completed and VA notified? na  Caregiver Contact: Flora Painter (Geraldo) (Sister-in-Law) (Other) [phone]   Discharge Caregiver contacted prior to discharge? contacted  Care Conference needed? no  Barriers to discharge: Medical stability      Patient transferred to ccu for higher level of care.       Anny Calderon RN BSN CRM        [phone] .  
Transition of Care Plan:    RUR: 23% \"high risk\"  Prior Level of Functioning: Needs assistance with ADL's   Disposition: Home with [facility]   RAY: 3/20  If SNF or IPR: Date FOC offered: N/A  Follow up appointments: PCP/Specialists as indicated  DME needed: Bipap - [facility], Yves lift - [facility]   Transportation at discharge: BLS anticipated  IM/IMM Medicare/ letter given: 2nd IM given by CM on 3/18  Is patient a [place] and connected with VA? No  Caregiver Contact: Yunior Mane (son), [phone]  Discharge Caregiver contacted prior to discharge? To be contacted  Care Conference needed? Not at this time   Barriers to discharge: Bipap delivery     1216 PM: OMAR contacted Heather with [facility] at [phone] to inquire if bipap can be delivered to pt bedside. Heather is not available today to deliver bipap, but Heather will be here at 1100AM tomorrow to provide pt with bipap to go home with. OMAR contacted Jag ([facility] liasion) at [phone] to provide update. Jag noted [facility] is all set to admit pt at home tomorrow. CM to arrange transport for pt tomorrow after bipap is delivered at bedside.    1154 AM: OMAR completed care conference alongside pt, pt family, MD, CM Leader, and palliative SW. Pt will return home with [facility]. Jock ([facility] liaison) noted the equipment has been delivered to pt home. OMAR contacted Esha with [facility] to inquire about bipap being delivered to the bedside, CM left voicemail. Pt can admit home with hospice once bipap delivery is confirmed. OMAR actively working on this.    0836 AM: Chart reviewed. OMAR contacted [facility] to inquire about status of F2F eval and DME delivery. Chelle with [facility] noted their medical director (Stefany) completed the F2F this morning and will provide me with an update once Stefany reports to her. Chelle noted all equipment has been delivered to the pt home except for the electric yves lyft. The plan is to deliver the electric yves lyft today. 
Transition of Care Plan:    RUR: 24% High RUR  Prior Level of Functioning: Assistance with the following:;Bathing;Dressing;Toileting;Mobility   Disposition: home  RAY: 3/3  If SNF or IPR: Date FOC offered: NA  Date FOC received:   Accepting facility:   Date authorization started with reference number:   Date authorization received and expires:   Follow up appointments: pcp/specialist   DME needed: none  Transportation at discharge: [facility] need is anticipated  IM/IMM Medicare/ letter given: 2nd IM upon discharge  Is patient a  and connected with VA? na   If yes, was  transfer form completed and VA notified? na  Caregiver Contact: MadinaJune (Geraldo) (Sister-in-Law) (Other) [phone]   Discharge Caregiver contacted prior to discharge? contacted  Care Conference needed? no  Barriers to discharge: medical clearance    CM noted Moderate intensity short-term skilled physical therapy up to 5x/week is the recommendation and the o2 needs remain high. Therefore, discharge will occur Tuesday after authorization is received      A SNF list was provided, emphasizing the patient/caregiver right to choose any agency/facility within network to meet their preference. The  has requested the selection of five preferred options within 24 hours to ensure a smooth discharge process and urges contacting CM once the choices are finalized to avoid any delays. Patients and caregivers are encouraged to ask questions, tour the facilities, and reach out for specific inquiries or accommodations.      The  (CM) noted no further new needs or concerns at this time. However, the CM emphasized their continued availability and willingness to assist should any needs or issues arise in the future.    The patient has caregivers who come in for 56 hours per week to assist with adl's.  They state the hours vary from 830am to 1700pm or 1000am to 1800pm. The nephews work however they state someone is there 
Transition of Care Plan:    RUR: 24% High RUR  Prior Level of Functioning: Assistance with the following:;Bathing;Dressing;Toileting;Mobility   Disposition: home with home health [facility] Phone: [phone] soc within 24 hhours  RAY: 3/5  If SNF or IPR: Date FOC offered: NA  Date FOC received:   Accepting facility:   Date authorization started with reference number:   Date authorization received and expires:   Follow up appointments: pcp/specialist   DME needed: BIPAP and a karmen lift through [place] Phone: [phone] and [email or web address]  Transportation at discharge: [facility] need is anticipated  IM/IMM Medicare/ letter given: 2nd IM 3/5  Is patient a [place] and connected with VA? na   If yes, was [place] transfer form completed and VA notified? na  Caregiver Contact: Flora Painter (Geraldo) (Sister-in-Law) (Other) [phone]   Discharge Caregiver contacted prior to discharge? contacted  Care Conference needed? no  Barriers to discharge: DME delivery      CM has secured [facility] Phone: [phone] with soc within 24 hours as a home health agency. Also, received the following update from [employer], regarding the karmen lift and and nebulizer \"Order is being processed. We will call to discuss copays and arrange delivery.\" Moreover, Esha Chow, [phone] fax [phone]; [email or web address] is working on securing a BIPAP machine.       The patient has caregivers who come in for 56 hours per week to assist with adl's.  They state the hours vary from 830am to 1700pm or 1000am to 1800pm. The nephews work however they state someone is there with the patient until they get home.     Tomas Chow RN  Case Management  [phone]    
Transition of Care Plan:    RUR: 24% \"high risk\"  Prior Level of Functioning: Needs assistance with ADL's   Disposition: [facility] with [facility]   RAY: 3/18  If SNF or IPR: Date FOC offered: N/A  Follow up appointments: PCP/Specialists as indicated  DME needed: Bipap - [facility] ([email or web address]), Yves lift - [facility]   Transportation at discharge: BLS anticipated  IM/IMM Medicare/ letter given: 2nd IM given by CM on 3/18  Is patient a [place] and connected with VA? No  Caregiver Contact: Yunior Mane (son), [phone]  Discharge Caregiver contacted prior to discharge? To be contacted  Care Conference needed? Not at this time   Barriers to discharge: Hospice F2F eval     1450 PM: CM received message from [facility] noting medical director will be here at 0800AM to complete face to face with pt. CM contacted [facility] to discuss bipap delivery, they will deliver once pt arrives home. [facility]    1047 AM: CM received message from [facility] noting their medical director will not be able to complete face to face evaluation until tomorrow morning. CM to made MD aware.    0816 AM: Chart reviewed. CM acknowledged d/c order. CM awaiting response from [facility] on when they can complete face to face evaluation. CM to continue to provide updates once made available.     CJ Cuevas  Care Management  [facility]   [phone]  
Transition of Care Plan:    RUR: 25% \"high risk\"  Prior Level of Functioning: Needs assistance with ADL's   Disposition: Home with [facility]   RAY: 3/18  If SNF or IPR: Date FOC offered: N/A  Follow up appointments: PCP/Specialists as indicated  DME needed: Bipap - [facility] ([email or web address]), Manual karmen lift - [facility] (if pt is agreeable)  Transportation at discharge: BLS anticipated  IM/IMM Medicare/ letter given: 2nd IM needed prior to d/c   Is patient a [place] and connected with VA? No  Caregiver Contact: Yunior Mane (son), [phone]  Discharge Caregiver contacted prior to discharge? To be contacted  Care Conference needed? Not at this time   Barriers to discharge: Hospice arrangement    1535 PM: CM received call from [facility] who noted they will have to complete a face to face evaluation with pt prior to pt being admitted at home. Chelle with [facility] noted they are trying to get one of their hospice reps to come see pt today or tomorrow. Chelle to provide CM with update once available.     1335 PM: CM contacted Esha Chow with [facility]. Esha noted that pt has been approved for Bipap and they are awaiting pt d/c to home as pt requested she be set up at home and not at the hospital. Esha noted pt spoke with Ele with [facility] on Friday and they have been in contact since then. CM to continue to provide updates as made available.    1327 PM: CM called Italia with [facility] to inquire about acceptance details. CM contacted JOSE F with [facility] to discuss as well. Chelle to call CM back about DME.    1124 AM: Chart reviewed. CM resumed care from GOSIA Drew. CM sent message to [facility] to inquire about ability to accommodate for bipap. Chelle with [facility] noted they can take pt on bipap. CM to continue to follow.    CJ Cuevas  Care Management  [facility]   [phone]  
[facility] Phone: [phone] soc within 24 hhours ; BIPAP and Yves lift)   [place] Resource Information Provided? No   Mode of Transport at Discharge BLS   Hospital Transport Time of Discharge 1300   Confirm Follow Up Transport Family   Condition of Participation: Discharge Planning   The Plan for Transition of Care is related to the following treatment goals: Care Transitions: Facilitate smooth transitions of care between different healthcare settings, such as rehabilitation facilities, and home care, ensuring that patients receive appropriate follow-up care and support to prevent gaps in care and reduce the risk of adverse events.   The Patient and/or Patient Representative was provided with a Choice of Provider? Patient   The Patient and/Or Patient Representative agree with the Discharge Plan? Yes   Freedom of Choice list was provided with basic dialogue that supports the patient's individualized plan of care/goals, treatment preferences, and shares the quality data associated with the providers?  Yes  (home health and DME)           The patient has caregivers who come in for 56 hours per week to assist with adl's.  They state the hours vary from 830am to 1700pm or 1000am to 1800pm. The nephews work however they state someone is there with the patient until they get home.     Tomas Chow RN  Case Management  [phone]    
Planning     General Advance Care Planning (ACP) Conversation    Date of Conversation: 2/24/2025  Conducted with: Patient with Decision Making Capacity and Legal next of kin  Other persons present: None    Healthcare Decision Maker:   Primary Decision Maker (Active): PainterFlora (Geraldo) (Sister-in-Law) - Other - [phone]    Secondary Decision Maker: Yunior Mane - Child - [phone]     Today we documented Decision Maker(s) consistent with ACP documents on file.  Content/Action Overview:  Has ACP document(s) on file - reflects the patient's care preferences  Reviewed DNR/DNI and patient elects Full Code (Attempt Resuscitation)        Length of Voluntary ACP Conversation in minutes:  <16 minutes (Non-Billable)    ANGELES RAMIREZ RN         Readmission Assessment  Number of Days since last admission?: 8-30 days  Previous Disposition: Home with Family  Who is being Interviewed: Caregiver  What was the patient's/caregiver's perception as to why they think they needed to return back to the hospital?: Other (Comment) (Patient became ill.)  Did you visit your Primary Care Physician after you left the hospital, before you returned this time?: No  Why weren't you able to visit your PCP?: Other (Comment) (Nephew not sure.)  Did you see a specialist, such as Cardiac, Pulmonary, Orthopedic Physician, etc. after you left the hospital?: No  Who advised the patient to return to the hospital?: Caregiver  Does the patient report anything that got in the way of taking their medications?: No  In our efforts to provide the best possible care to you and others like you, can you think of anything that we could have done to help you after you left the hospital the first time, so that you might not have needed to return so soon?: Other (Comment) (No)          
coverage within network agencies and the patient challenging geographic location, a mass referral has been made for home health services.        The patient has caregivers who come in for 56 hours per week to assist with adl's.  They state the hours vary from 830am to 1700pm or 1000am to 1800pm. The nephews work however they state someone is there with the patient until they get home.         Tomas Chow, RN  Case Management  [phone]    
someone is there with the patient until they get home.     15:36 CM met with the patient and informed her that [employer], [employer] Phone: [phone] is the only accepting agency; she agreed to have them as a home health agency. Also, requested a nebulizer machine. CM sent a referral to [email or web address] and contacted Kash [phone]  to assist with accelerating the processing as CM provided new notes and requested updates on the karmen lift approval but no answer on careport.     Tomas Chow RN  Case Management  [phone]    
them and let them know she is in agreement with the manual one.  Spoke  with the patient and informed her this cm had checked with several companies ([employer], [facility], [employer], [employer]) and they do not offer electrical karmen lifts .Informed patient and informed her if she wants the manual karmen lift she will need to call [employer] so they can reorder it. She wants to think about it.      Anny Calderon RN BSN CRM        [phone]

## 2025-03-20 NOTE — PLAN OF CARE
Problem: Gastrointestinal - Adult  Goal: Minimal or absence of nausea and vomiting  Outcome: Progressing  Goal: Maintains or returns to baseline bowel function  Outcome: Progressing  Goal: Maintains adequate nutritional intake  Outcome: Progressing     
  Problem: Infection - Adult  Goal: Absence of infection during hospitalization  Outcome: Progressing     Problem: Infection - Adult  Goal: Absence of fever/infection during anticipated neutropenic period  Outcome: Progressing     
  Problem: Physical Therapy - Adult  Goal: By Discharge: Performs mobility at highest level of function for planned discharge setting.  See evaluation for individualized goals.  Description: FUNCTIONAL STATUS PRIOR TO ADMISSION: The patient is bed bound. She reports she has caregivers. She does not leave her apartment or get out of the bed.     HOME SUPPORT PRIOR TO ADMISSION: The patient lived with her family.    Physical Therapy Goals  Initiated 2/28/2025  1.  Patient will move from supine to sit and sit to supine in bed with maximal assistance within 7 day(s).    2.  Patient will tolerate sitting EOB with minimal assistance for 5 minutes within 7 day(s).   Outcome: Progressing   PHYSICAL THERAPY EVALUATION    Patient: Jessica Mane (54 y.o. female)  Date: 2/28/2025  Primary Diagnosis: Hypercapnic respiratory failure (HCC) [J96.92]       Precautions:              ASSESSMENT :   DEFICITS/IMPAIRMENTS:   The patient is limited by decreased functional mobility, strength, activity tolerance, endurance, coordination, balance S/P admission for hypercapnic respiratory failure. Her PMHx is consistent with metastatic breast CA, COPD with chronic O2 use (5L/min) and TERRA. She is received supine in bed. Patient is alert and interactive today. She reports not being hard of hearing but requires frequent repeating of VC and questions. She demonstrates the ability to roll R and L with Min- Mod A. Patient is provided total A for pericare. Patient reports fatigue after rolling and repositioning in bed but is eager to attempt sitting EOB. Vitals are stable with bed mobility.   1525 Patient is provided Max A with bed mechanics to sit EOB. She requires O2 increase from 5-7 Lmin in order to maintain sats greater than 90%.     Patient will benefit from skilled intervention to address the above impairments.    Functional Outcome Measure:  The patient scored 6/24 on the [facility] outcome measure.       PLAN :  Recommendations and Planned 
  Problem: Respiratory - Adult  Goal: Achieves optimal ventilation and oxygenation  3/11/2025 0721 by Alissa Enriquez, RT  Outcome: Progressing  3/10/2025 2221 by Alix Lamb, RT  Outcome: Progressing     Problem: Anxiety  Goal: Will report anxiety at manageable levels  Description: INTERVENTIONS:  1. Administer medication as ordered  2. Teach and rehearse alternative coping skills  3. Provide emotional support with 1:1 interaction with staff  Outcome: Progressing     
  Problem: Respiratory - Adult  Goal: Achieves optimal ventilation and oxygenation  3/17/2025 1831 by Evan Mathews RN  Outcome: Progressing  3/17/2025 1830 by Evan Mathews RN  Outcome: Progressing  3/17/2025 1830 by Evan Mathews RN  Outcome: Progressing  3/17/2025 0816 by Italia Elizondo RT  Outcome: Progressing     Problem: Neurosensory - Adult  Goal: Achieves stable or improved neurological status  3/17/2025 1831 by Evan Mathews RN  Outcome: Progressing  3/17/2025 1830 by Evan Mathews RN  Outcome: Progressing  3/17/2025 1830 by Evan Mathews RN  Outcome: Progressing     Problem: Musculoskeletal - Adult  Goal: Return mobility to safest level of function  3/17/2025 1831 by Evan Mathews RN  Outcome: Progressing  3/17/2025 1830 by Evan Mathews RN  Outcome: Progressing     Problem: Gastrointestinal - Adult  Goal: Minimal or absence of nausea and vomiting  3/17/2025 1831 by Evan Mathews RN  Outcome: Progressing  3/17/2025 1830 by Evan Mathews RN  Outcome: Progressing     Problem: Infection - Adult  Goal: Absence of infection at discharge  Outcome: Progressing  Goal: Absence of infection during hospitalization  Outcome: Progressing     Problem: Genitourinary - Adult  Goal: Absence of urinary retention  Outcome: Progressing     Problem: Skin/Tissue Integrity  Goal: Skin integrity remains intact  Description: 1.  Monitor for areas of redness and/or skin breakdown  2.  Assess vascular access sites hourly  3.  Every 4-6 hours minimum:  Change oxygen saturation probe site  4.  Every 4-6 hours:  If on nasal continuous positive airway pressure, respiratory therapy assess nares and determine need for appliance change or resting period  Outcome: Progressing     Problem: Skin/Tissue Integrity - Adult  Goal: Skin integrity remains intact  Description: 1.  Monitor for areas of redness and/or skin breakdown  2.  Assess vascular access sites hourly  3.  Every 4-6 hours 
  Problem: Respiratory - Adult  Goal: Achieves optimal ventilation and oxygenation  3/18/2025 1221 by Evan Mathews RN  Outcome: Progressing  3/18/2025 0831 by Italia Elizondo, RT  Outcome: Progressing     Problem: Neurosensory - Adult  Goal: Achieves maximal functionality and self care  Outcome: Progressing     Problem: Musculoskeletal - Adult  Goal: Return mobility to safest level of function  Outcome: Progressing     Problem: Gastrointestinal - Adult  Goal: Minimal or absence of nausea and vomiting  Outcome: Progressing     Problem: Genitourinary - Adult  Goal: Absence of urinary retention  Outcome: Progressing     Problem: Chronic Conditions and Co-morbidities  Goal: Patient's chronic conditions and co-morbidity symptoms are monitored and maintained or improved  Outcome: Progressing     Problem: Discharge Planning  Goal: Discharge to home or other facility with appropriate resources  Outcome: Progressing     Problem: Skin/Tissue Integrity  Goal: Skin integrity remains intact  Description: 1.  Monitor for areas of redness and/or skin breakdown  2.  Assess vascular access sites hourly  3.  Every 4-6 hours minimum:  Change oxygen saturation probe site  4.  Every 4-6 hours:  If on nasal continuous positive airway pressure, respiratory therapy assess nares and determine need for appliance change or resting period  Outcome: Progressing     Problem: Safety - Adult  Goal: Free from fall injury  Outcome: Progressing     Problem: Cardiovascular - Adult  Goal: Maintains optimal cardiac output and hemodynamic stability  Outcome: Progressing     Problem: ABCDS Injury Assessment  Goal: Absence of physical injury  Outcome: Progressing     Problem: Anxiety  Goal: Will report anxiety at manageable levels  Description: INTERVENTIONS:  1. Administer medication as ordered  2. Teach and rehearse alternative coping skills  3. Provide emotional support with 1:1 interaction with staff  Outcome: Progressing     Problem: 
  Problem: Respiratory - Adult  Goal: Achieves optimal ventilation and oxygenation  3/19/2025 1502 by Chiquita Batista RN  Outcome: Progressing  3/19/2025 0826 by Italia Elizondo RT  Outcome: Progressing  3/19/2025 0307 by Derian Wetzel, SHAISTA  Outcome: Progressing     Problem: Skin/Tissue Integrity  Goal: Skin integrity remains intact  Description: 1.  Monitor for areas of redness and/or skin breakdown  2.  Assess vascular access sites hourly  3.  Every 4-6 hours minimum:  Change oxygen saturation probe site  4.  Every 4-6 hours:  If on nasal continuous positive airway pressure, respiratory therapy assess nares and determine need for appliance change or resting period  Outcome: Progressing     Problem: Skin/Tissue Integrity - Adult  Goal: Skin integrity remains intact  Description: 1.  Monitor for areas of redness and/or skin breakdown  2.  Assess vascular access sites hourly  3.  Every 4-6 hours minimum:  Change oxygen saturation probe site  4.  Every 4-6 hours:  If on nasal continuous positive airway pressure, respiratory therapy assess nares and determine need for appliance change or resting period  Outcome: Progressing     Problem: Neurosensory - Adult  Goal: Achieves stable or improved neurological status  Outcome: Progressing     Problem: Gastrointestinal - Adult  Goal: Minimal or absence of nausea and vomiting  Outcome: Progressing     Problem: Genitourinary - Adult  Goal: Absence of urinary retention  Outcome: Progressing     
  Problem: Respiratory - Adult  Goal: Achieves optimal ventilation and oxygenation  3/20/2025 0226 by Silvana Dave RN  Outcome: Progressing  3/19/2025 1502 by Chiquita Batista RN  Outcome: Progressing     Problem: Neurosensory - Adult  Goal: Achieves stable or improved neurological status  3/20/2025 0226 by Silvana Dave RN  Outcome: Progressing  3/19/2025 1502 by Chiquita Batista RN  Outcome: Progressing     Problem: Gastrointestinal - Adult  Goal: Minimal or absence of nausea and vomiting  3/20/2025 0226 by Silvana Dave RN  Outcome: Progressing  3/19/2025 1502 by Chiquita Batista RN  Outcome: Progressing            
  Problem: Respiratory - Adult  Goal: Achieves optimal ventilation and oxygenation  3/3/2025 1935 by Willa Rodriguez RCP  Outcome: Progressing     
  Problem: Respiratory - Adult  Goal: Achieves optimal ventilation and oxygenation  3/4/2025 1338 by Evan Mathews RN  Outcome: Progressing  3/4/2025 0725 by Ignacia Jorgensen RCP  Outcome: Progressing     Problem: Chronic Conditions and Co-morbidities  Goal: Patient's chronic conditions and co-morbidity symptoms are monitored and maintained or improved  Outcome: Progressing     Problem: Skin/Tissue Integrity  Goal: Skin integrity remains intact  Description: 1.  Monitor for areas of redness and/or skin breakdown  2.  Assess vascular access sites hourly  3.  Every 4-6 hours minimum:  Change oxygen saturation probe site  4.  Every 4-6 hours:  If on nasal continuous positive airway pressure, respiratory therapy assess nares and determine need for appliance change or resting period  Outcome: Progressing     Problem: Safety - Adult  Goal: Free from fall injury  Outcome: Progressing     Problem: Cardiovascular - Adult  Goal: Maintains optimal cardiac output and hemodynamic stability  Outcome: Progressing     Problem: Skin/Tissue Integrity - Adult  Goal: Skin integrity remains intact  Description: 1.  Monitor for areas of redness and/or skin breakdown  2.  Assess vascular access sites hourly  3.  Every 4-6 hours minimum:  Change oxygen saturation probe site  4.  Every 4-6 hours:  If on nasal continuous positive airway pressure, respiratory therapy assess nares and determine need for appliance change or resting period  Outcome: Progressing     Problem: Hematologic - Adult  Goal: Maintains hematologic stability  Outcome: Progressing     Problem: ABCDS Injury Assessment  Goal: Absence of physical injury  Outcome: Progressing     Problem: Nutrition Deficit:  Goal: Optimize nutritional status  Outcome: Progressing     Problem: Pain  Goal: Verbalizes/displays adequate comfort level or baseline comfort level  Outcome: Progressing     
  Problem: Respiratory - Adult  Goal: Achieves optimal ventilation and oxygenation  3/4/2025 1925 by Willa Rodriguez RCP  Outcome: Progressing  3/4/2025 1338 by Evan Mathews RN  Outcome: Progressing  3/4/2025 0725 by Ignacia Jorgensen RCP  Outcome: Progressing     
  Problem: Respiratory - Adult  Goal: Achieves optimal ventilation and oxygenation  3/7/2025 0530 by Carmelo Menjivar RN  Outcome: Progressing  3/6/2025 1934 by Charlotte Herring RN  Outcome: Not Progressing  Flowsheets (Taken 2/27/2025 0052 by Stacey Woodard, RN)  Achieves optimal ventilation and oxygenation:   Assess for changes in respiratory status   Assess for changes in mentation and behavior   Assess the need for suctioning and aspirate as needed   Assess and instruct to report shortness of breath or any respiratory difficulty     Problem: Chronic Conditions and Co-morbidities  Goal: Patient's chronic conditions and co-morbidity symptoms are monitored and maintained or improved  3/7/2025 0530 by Carmelo Menjivar RN  Outcome: Progressing  3/6/2025 1934 by Charlotte Herring RN  Outcome: Not Progressing  Flowsheets (Taken 3/6/2025 0800)  Care Plan - Patient's Chronic Conditions and Co-Morbidity Symptoms are Monitored and Maintained or Improved: Collaborate with multidisciplinary team to address chronic and comorbid conditions and prevent exacerbation or deterioration     Problem: Skin/Tissue Integrity  Goal: Skin integrity remains intact  Description: 1.  Monitor for areas of redness and/or skin breakdown  2.  Assess vascular access sites hourly  3.  Every 4-6 hours minimum:  Change oxygen saturation probe site  4.  Every 4-6 hours:  If on nasal continuous positive airway pressure, respiratory therapy assess nares and determine need for appliance change or resting period  3/7/2025 0530 by Carmelo Menjivar RN  Outcome: Progressing  3/6/2025 1934 by Charlotte Herring RN  Outcome: Not Progressing  Flowsheets (Taken 3/6/2025 0800)  Skin Integrity Remains Intact:   Monitor for areas of redness and/or skin breakdown   Every 4-6 hours minimum: Change oxygen saturation probe site     Problem: Skin/Tissue Integrity - Adult  Goal: Skin integrity remains intact  Description: 1.  Monitor for areas of redness and/or skin 
  Problem: Respiratory - Adult  Goal: Achieves optimal ventilation and oxygenation  3/8/2025 0247 by Carmelo Menjivar RN  Outcome: Progressing  3/7/2025 2106 by Elisa Lin RT  Outcome: Progressing  3/7/2025 1818 by Gurpreet Rosales RN  Outcome: Progressing     Problem: Chronic Conditions and Co-morbidities  Goal: Patient's chronic conditions and co-morbidity symptoms are monitored and maintained or improved  3/8/2025 0247 by Carmelo Menjivar RN  Outcome: Progressing  3/7/2025 1818 by Gurpreet Rosales RN  Outcome: Progressing     Problem: Discharge Planning  Goal: Discharge to home or other facility with appropriate resources  3/8/2025 0247 by Carmelo Menjivar RN  Outcome: Progressing  3/7/2025 1818 by Gurpreet Rosales RN  Outcome: Progressing     Problem: Skin/Tissue Integrity  Goal: Skin integrity remains intact  Description: 1.  Monitor for areas of redness and/or skin breakdown  2.  Assess vascular access sites hourly  3.  Every 4-6 hours minimum:  Change oxygen saturation probe site  4.  Every 4-6 hours:  If on nasal continuous positive airway pressure, respiratory therapy assess nares and determine need for appliance change or resting period  3/8/2025 0247 by Carmelo Menjivar RN  Outcome: Progressing  3/7/2025 1818 by Gurpreet Rosales RN  Outcome: Progressing  Flowsheets (Taken 3/7/2025 0800)  Skin Integrity Remains Intact:   Monitor for areas of redness and/or skin breakdown   Assess vascular access sites hourly     Problem: Safety - Adult  Goal: Free from fall injury  3/8/2025 0247 by Carmelo Menjivar RN  Outcome: Progressing  3/7/2025 1818 by Gurpreet Rosales RN  Outcome: Progressing     Problem: Cardiovascular - Adult  Goal: Maintains optimal cardiac output and hemodynamic stability  3/8/2025 0247 by Carmelo Menjivar RN  Outcome: Progressing  3/7/2025 1818 by Gurpreet Rosales RN  Outcome: Progressing  Goal: Absence of cardiac dysrhythmias or at baseline  3/7/2025 1818 by Gurpreet Rosales RN  Outcome: 
  Problem: Respiratory - Adult  Goal: Achieves optimal ventilation and oxygenation  Outcome: Not Progressing  Flowsheets (Taken 2/27/2025 0052 by Stacey Woodard, RN)  Achieves optimal ventilation and oxygenation:   Assess for changes in respiratory status   Assess for changes in mentation and behavior   Assess the need for suctioning and aspirate as needed   Assess and instruct to report shortness of breath or any respiratory difficulty     Problem: Chronic Conditions and Co-morbidities  Goal: Patient's chronic conditions and co-morbidity symptoms are monitored and maintained or improved  Outcome: Not Progressing  Flowsheets (Taken 3/6/2025 0800)  Care Plan - Patient's Chronic Conditions and Co-Morbidity Symptoms are Monitored and Maintained or Improved: Collaborate with multidisciplinary team to address chronic and comorbid conditions and prevent exacerbation or deterioration     Problem: Skin/Tissue Integrity  Goal: Skin integrity remains intact  Outcome: Not Progressing  Flowsheets (Taken 3/6/2025 0800)  Skin Integrity Remains Intact:   Monitor for areas of redness and/or skin breakdown   Every 4-6 hours minimum: Change oxygen saturation probe site     Problem: Skin/Tissue Integrity - Adult  Goal: Skin integrity remains intact  Outcome: Not Progressing  Flowsheets (Taken 3/6/2025 0800)  Skin Integrity Remains Intact:   Monitor for areas of redness and/or skin breakdown   Every 4-6 hours minimum: Change oxygen saturation probe site     Problem: Respiratory - Adult  Goal: Achieves optimal ventilation and oxygenation  Outcome: Not Progressing  Flowsheets (Taken 2/27/2025 0052 by Stacey Woodard, RN)  Achieves optimal ventilation and oxygenation:   Assess for changes in respiratory status   Assess for changes in mentation and behavior   Assess the need for suctioning and aspirate as needed   Assess and instruct to report shortness of breath or any respiratory difficulty     Problem: Chronic Conditions and 
  Problem: Respiratory - Adult  Goal: Achieves optimal ventilation and oxygenation  Outcome: Progressing     Problem: Chronic Conditions and Co-morbidities  Goal: Patient's chronic conditions and co-morbidity symptoms are monitored and maintained or improved  Outcome: Progressing     Problem: Discharge Planning  Goal: Discharge to home or other facility with appropriate resources  Outcome: Progressing     Problem: Skin/Tissue Integrity  Goal: Skin integrity remains intact  Description: 1.  Monitor for areas of redness and/or skin breakdown  2.  Assess vascular access sites hourly  3.  Every 4-6 hours minimum:  Change oxygen saturation probe site  4.  Every 4-6 hours:  If on nasal continuous positive airway pressure, respiratory therapy assess nares and determine need for appliance change or resting period  Outcome: Progressing     Problem: Safety - Adult  Goal: Free from fall injury  Outcome: Progressing     Problem: Cardiovascular - Adult  Goal: Maintains optimal cardiac output and hemodynamic stability  Outcome: Progressing  Goal: Absence of cardiac dysrhythmias or at baseline  Outcome: Progressing     Problem: Skin/Tissue Integrity - Adult  Goal: Skin integrity remains intact  Description: 1.  Monitor for areas of redness and/or skin breakdown  2.  Assess vascular access sites hourly  3.  Every 4-6 hours minimum:  Change oxygen saturation probe site  4.  Every 4-6 hours:  If on nasal continuous positive airway pressure, respiratory therapy assess nares and determine need for appliance change or resting period  Outcome: Progressing  Goal: Incisions, wounds, or drain sites healing without S/S of infection  Outcome: Progressing  Goal: Oral mucous membranes remain intact  Outcome: Progressing     Problem: Hematologic - Adult  Goal: Maintains hematologic stability  Outcome: Progressing     Problem: ABCDS Injury Assessment  Goal: Absence of physical injury  Outcome: Progressing     Problem: Nutrition Deficit:  Goal: 
  Problem: Respiratory - Adult  Goal: Achieves optimal ventilation and oxygenation  Outcome: Progressing  Flowsheets (Taken 3/13/2025 2000)  Achieves optimal ventilation and oxygenation:   Assess for changes in respiratory status   Assess for changes in mentation and behavior   Position to facilitate oxygenation and minimize respiratory effort   Oxygen supplementation based on oxygen saturation or arterial blood gases   Encourage broncho-pulmonary hygiene including cough, deep breathe, incentive spirometry   Assess and instruct to report shortness of breath or any respiratory difficulty   Respiratory therapy support as indicated   Assess the need for suctioning and aspirate as needed     Problem: Chronic Conditions and Co-morbidities  Goal: Patient's chronic conditions and co-morbidity symptoms are monitored and maintained or improved  Outcome: Progressing  Flowsheets (Taken 3/13/2025 2000)  Care Plan - Patient's Chronic Conditions and Co-Morbidity Symptoms are Monitored and Maintained or Improved:   Monitor and assess patient's chronic conditions and comorbid symptoms for stability, deterioration, or improvement   Collaborate with multidisciplinary team to address chronic and comorbid conditions and prevent exacerbation or deterioration   Update acute care plan with appropriate goals if chronic or comorbid symptoms are exacerbated and prevent overall improvement and discharge     Problem: Discharge Planning  Goal: Discharge to home or other facility with appropriate resources  Outcome: Progressing  Flowsheets (Taken 3/13/2025 2000)  Discharge to home or other facility with appropriate resources:   Identify barriers to discharge with patient and caregiver   Identify discharge learning needs (meds, wound care, etc)     Problem: Skin/Tissue Integrity  Goal: Skin integrity remains intact  Description: 1.  Monitor for areas of redness and/or skin breakdown  2.  Assess vascular access sites hourly  3.  Every 4-6 hours 
  Problem: Respiratory - Adult  Goal: Achieves optimal ventilation and oxygenation  Outcome: Progressing  Flowsheets (Taken 3/14/2025 2000)  Achieves optimal ventilation and oxygenation:   Assess for changes in respiratory status   Assess for changes in mentation and behavior   Position to facilitate oxygenation and minimize respiratory effort     Problem: Chronic Conditions and Co-morbidities  Goal: Patient's chronic conditions and co-morbidity symptoms are monitored and maintained or improved  Outcome: Progressing  Flowsheets (Taken 3/14/2025 2000)  Care Plan - Patient's Chronic Conditions and Co-Morbidity Symptoms are Monitored and Maintained or Improved:   Monitor and assess patient's chronic conditions and comorbid symptoms for stability, deterioration, or improvement   Collaborate with multidisciplinary team to address chronic and comorbid conditions and prevent exacerbation or deterioration   Update acute care plan with appropriate goals if chronic or comorbid symptoms are exacerbated and prevent overall improvement and discharge     Problem: Discharge Planning  Goal: Discharge to home or other facility with appropriate resources  Outcome: Progressing     Problem: Skin/Tissue Integrity  Goal: Skin integrity remains intact  Description: 1.  Monitor for areas of redness and/or skin breakdown  2.  Assess vascular access sites hourly  3.  Every 4-6 hours minimum:  Change oxygen saturation probe site  4.  Every 4-6 hours:  If on nasal continuous positive airway pressure, respiratory therapy assess nares and determine need for appliance change or resting period  Outcome: Progressing  Flowsheets (Taken 3/14/2025 2000)  Skin Integrity Remains Intact:   Monitor for areas of redness and/or skin breakdown   Assess vascular access sites hourly     Problem: Safety - Adult  Goal: Free from fall injury  Outcome: Progressing  Flowsheets (Taken 3/14/2025 2000)  Free From Fall Injury: Based on caregiver fall risk screen, 
Patient alert and oriented x3. No pain reported.  Turned and repositioned. Had BM, eating well.      Problem: Respiratory - Adult  Goal: Achieves optimal ventilation and oxygenation  3/7/2025 1818 by Gurpreet Rosales RN  Outcome: Progressing  3/7/2025 0940 by Heather Rodriguez RCP  Outcome: Progressing  3/7/2025 0530 by Carmelo Menjivar RN  Outcome: Progressing     Problem: Chronic Conditions and Co-morbidities  Goal: Patient's chronic conditions and co-morbidity symptoms are monitored and maintained or improved  3/7/2025 1818 by Gurpreet Rosales RN  Outcome: Progressing  3/7/2025 0530 by Carmelo Menjivar RN  Outcome: Progressing     Problem: Skin/Tissue Integrity  Goal: Skin integrity remains intact  Description: 1.  Monitor for areas of redness and/or skin breakdown  2.  Assess vascular access sites hourly  3.  Every 4-6 hours minimum:  Change oxygen saturation probe site  4.  Every 4-6 hours:  If on nasal continuous positive airway pressure, respiratory therapy assess nares and determine need for appliance change or resting period  3/7/2025 1818 by Gurpreet Rosales RN  Outcome: Progressing  Flowsheets (Taken 3/7/2025 0800)  Skin Integrity Remains Intact:   Monitor for areas of redness and/or skin breakdown   Assess vascular access sites hourly  3/7/2025 0530 by Carmelo Menjivar RN  Outcome: Progressing     Goal: Verbalizes/displays adequate comfort level or baseline comfort level  3/7/2025 0530 by Carmelo Menjivar RN  Outcome: Progressing     
Refused avaps    
Speech LAnguage Pathology EVALUATION/DISCHARGE    Patient: Jessica Mane (54 y.o. female)  Date: 3/12/2025  Primary Diagnosis: Hypercapnic respiratory failure (HCC) [J96.92]       Precautions:  Isolation                  ASSESSMENT :  Patient presents with suspect baseline oropharyngeal swallow function negatively impacted by current O2 requirement (mid flow 15L). Patient reports she typically eats all foods, stating they are typically cut up for her. Oral mechanism seemingly WFL. Patient reports baseline feeling of globus sensation at sternal notch area with solids only; question possible esophageal component. No overt clinical s/s aspiration across PO trials. SLP educated patient on aspiration precautions and relationship between breathing and swallowing. No further acute SLP services warranted at this time. SLP will sign off.     Patient will be discharged from skilled speech-language pathology services at this time.     PLAN :  Recommendations and Planned Interventions:  Diet: Easy to chew and thin liquids  --Can advance to regular per patient preference   --Medications as tolerated   --Upright all PO intake   --Alternate solids & liquids   --Slow rate  --Oral hygiene 2-3x/day    Reflux precautions:  - Sit fully upright at 90 for meals (prefer that meals are eaten while sitting in a chair)  - Remain up for 1-hour after meals  - Consider 6-smaller meals throughout the day (eat about every 2-3 hours) instead of large meals  - Avoid acidic foods (tomato based, citrus fruits, alcohol, high-fat dairy, caffeine, fried foods)  - Trial warm, de-caffeinated beverages with meals to improve esophageal clearance  - Sleep w/ HOB elevated (30 degrees)  - Stop eating/drinking anything except water 2-hours before bedtime  - Avoid strenuous activity after meals (walking is OK)       Acute SLP Services: No, patient will be discharged from acute skilled speech-language pathology at this time.  Discharge Recommendations: No, 
 Every 4-6 hours:  If on nasal continuous positive airway pressure, respiratory therapy assess nares and determine need for appliance change or resting period  3/5/2025 0200 by Beatrice Zuleta RN  Outcome: Progressing  3/4/2025 1338 by Evan Mathews RN  Outcome: Progressing  Goal: Incisions, wounds, or drain sites healing without S/S of infection  Outcome: Progressing  Goal: Oral mucous membranes remain intact  Outcome: Progressing     Problem: Hematologic - Adult  Goal: Maintains hematologic stability  3/5/2025 0200 by Beatrice Zuleta RN  Outcome: Progressing  3/4/2025 1338 by Evan Mathews RN  Outcome: Progressing     Problem: ABCDS Injury Assessment  Goal: Absence of physical injury  3/5/2025 0200 by Beatrice Zuleta RN  Outcome: Progressing  3/4/2025 1338 by Evan Mathews RN  Outcome: Progressing     Problem: Nutrition Deficit:  Goal: Optimize nutritional status  3/5/2025 0200 by Beatrice Zuleta RN  Outcome: Progressing  3/4/2025 1338 by Evan Mathews RN  Outcome: Progressing     Problem: Pain  Goal: Verbalizes/displays adequate comfort level or baseline comfort level  3/5/2025 0200 by Beatrice Zuleta RN  Outcome: Progressing  3/4/2025 1338 by Evan Mathews RN  Outcome: Progressing     
Absence of cardiac dysrhythmias or at baseline  Outcome: Progressing  Flowsheets (Taken 2/23/2025 2000)  Absence of cardiac dysrhythmias or at baseline: Monitor cardiac rate and rhythm     Problem: Skin/Tissue Integrity - Adult  Goal: Skin integrity remains intact  Description: 1.  Monitor for areas of redness and/or skin breakdown  2.  Assess vascular access sites hourly  3.  Every 4-6 hours minimum:  Change oxygen saturation probe site  4.  Every 4-6 hours:  If on nasal continuous positive airway pressure, respiratory therapy assess nares and determine need for appliance change or resting period  Outcome: Progressing  Flowsheets (Taken 2/23/2025 2000)  Skin Integrity Remains Intact: Monitor for areas of redness and/or skin breakdown  Goal: Incisions, wounds, or drain sites healing without S/S of infection  Outcome: Progressing  Flowsheets (Taken 2/23/2025 2000)  Incisions, Wounds, or Drain Sites Healing Without Sign and Symptoms of Infection: TWICE DAILY: Assess and document skin integrity  Goal: Oral mucous membranes remain intact  Outcome: Progressing  Flowsheets (Taken 2/23/2025 2000)  Oral Mucous Membranes Remain Intact: Assess oral mucosa and hygiene practices     Problem: Hematologic - Adult  Goal: Maintains hematologic stability  Outcome: Progressing  Flowsheets (Taken 2/23/2025 2000)  Maintains hematologic stability: Assess for signs and symptoms of bleeding or hemorrhage     Problem: ABCDS Injury Assessment  Goal: Absence of physical injury  Outcome: Progressing     
Assessment  Goal: Absence of physical injury  Outcome: Progressing     Problem: Nutrition Deficit:  Goal: Optimize nutritional status  Outcome: Progressing     Problem: Pain  Goal: Verbalizes/displays adequate comfort level or baseline comfort level  Outcome: Progressing     
Incisions, wounds, or drain sites healing without S/S of infection  Outcome: Progressing  Goal: Oral mucous membranes remain intact  Outcome: Progressing     Problem: Hematologic - Adult  Goal: Maintains hematologic stability  Outcome: Progressing     Problem: ABCDS Injury Assessment  Goal: Absence of physical injury  Outcome: Progressing     Problem: Nutrition Deficit:  Goal: Optimize nutritional status  Outcome: Progressing     Problem: Pain  Goal: Verbalizes/displays adequate comfort level or baseline comfort level  Outcome: Progressing     
stability  Outcome: Progressing     Problem: ABCDS Injury Assessment  Goal: Absence of physical injury  Outcome: Progressing     Problem: Nutrition Deficit:  Goal: Optimize nutritional status  Outcome: Progressing     Problem: Pain  Goal: Verbalizes/displays adequate comfort level or baseline comfort level  Outcome: Progressing

## 2025-03-20 NOTE — PROGRESS NOTES
Pulmonary, Critical Care, and Sleep Medicine~Progress Note    Name: Jessica Mane MRN: [id number]   : 1970 Hospital: [facility]   Date: 2025 1:07 PM Admission: 2025     Impression Plan   Acute on chronic hypoxemic and hypercapnic respiratory failure- on 4-5L NC at baseline   Acute metabolic encephalopathy   TERRA noncompliant with CPAP   RSV +  COPD exacerbation   CXR with new L pleural effusion. US with no fluid. This is chronic subtotal atelectasis due to mucus plugging as seen on recent CT scan   Metastatic breast cancer s/p XRT, hormonal therapy on palliative care for pain management   Hx of PE on eliuqis   Former smoker  Continue supplemental O2 to maintain SpO2 >88%  Continue NIV qHS and PRN naps or increased WOB  Lethargic on exam- check ABG   Started on vanc/zosyn transitioned to ceftriaxone- completed course    IVCS transitioned to prednisone burst- completed course  Continue nebs to equate home trelegy  Pulmonary toilet   Continue GOC discussions, noted family wants full code        Daily Progression:     Patient seen this morning lying in bed. Discussed with bedside nurse. Increased lethargy. Not eating much. Patient very [place] and falling asleep. Complains of chest pain although cannot answer any more questions. On 6L NC.     Consult Note    54 yof with pmhx significant for chronic hypoxemic respiratory failure on 4-5L NC, TERRA non compliant with CPAP, metastatic breast cancer s/p XRT, hormonal therapy, prior PE on eliquis, COPD, former smoker, recently admitted -2/10 for influenza and superimposed pneumonia. CT at that time showed mucus plugging and subtotal LLL atelectasis. She presented to the ED  with increased shortness of breath. CXR with L side atelectasis and pleural effusion. Was admitted to ICU on BIPAP for hypercapnia.      Patient lying in bed. States she feels terrible but cannot be more specific when asked. Denies 
                           Pulmonary, Critical Care, and Sleep Medicine~Progress Note    Name: Jessica Mane MRN: [id number]   : 1970 Hospital: [facility]   Date: 2025 11:40 AM Admission: 2025     Impression Plan   Acute on chronic hypoxemic and hypercapnic respiratory failure- on 4-5L NC at baseline   Acute metabolic encephalopathy   TERRA noncompliant with CPAP   RSV +  COPD exacerbation   CXR with new L pleural effusion. US with no fluid. This is chronic subtotal atelectasis due to mucus plugging as seen on recent CT scan   Metastatic breast cancer s/p XRT, hormonal therapy on palliative care for pain management   Hx of PE on eliuqis   Former smoker  Continue supplemental O2 to maintain SpO2 >88%  Continue NIV qHS and PRN naps or increased WOB  Started on vanc/zosyn transitioned to ceftriaxone- completed course    IVCS transitioned to prednisone burst- completed course  Continue nebs to equate home trelegy  Pulmonary toilet   PT/OT for dispo planning     Will sign off. Please call with questions. Will arrange outpatient follow up with our office.        Daily Progression:     Patient seen this morning lying in bed. Discussed with bedside nurse. Patient is awake and most alert she has been throughout this hospitalization. States her breathing is close to baseline. Per nurse, family wants her to go to rehab. Wore NIV ~6 hours overnight. On 6L NC.      Patient seen this morning lying in bed. Discussed with bedside nurse. Increased lethargy. Not eating much. Patient very [place] and falling asleep. Complains of chest pain although cannot answer any more questions. On 6L NC.     Consult Note    54 yof with pmhx significant for chronic hypoxemic respiratory failure on 4-5L NC, TERRA non compliant with CPAP, metastatic breast cancer s/p XRT, hormonal therapy, prior PE on eliquis, COPD, former smoker, recently admitted -2/10 for influenza and superimposed pneumonia. CT at that 
                 Critical Care Progress Note  Grayson DUMONT MD          Date of Service:  3/15/2025  NAME:  Jessica Mane  :  1970  MRN:  [id number]      Subjective/Hospital course:      Acute on chronic hypoxic, hypercapnic respiratory failure   HFpEF with exacerbation     - Sitting comfortably on the bed, used BiPAP overnight.   : On 6 lit oxygen. Awake and alert, no respiratory distress.   3/8: Patient was transferred out of ICU on .  Today she is on BiPAP and chest x-ray shows left lung collapse.  Critical care consult was again requested and I decided to transfer back to ICU.  She looks comfortable.  No respiratory distress.  Currently on 50% FiO2.  3/9: Patient remains on BiPAP support.  Awake and follows commands.  3/10: Patient remains on BiPAP support.  Currently on 50% FiO2.  3/11: Patient is currently on high flow oxygen. Was on BiPAP at night.   3/12:Patient off of BiPAP this morning, on mid flow oxygen. Awake and alert.  3/13: Patient is currently on 10 L oxygen.  Awake and alert.  3/14: Patient is awake and alert.  Now off of BiPAP, on 10 L oxygen.  Has been using BiPAP as needed.   3/15: awaiting for hospice to set up her house  Tolerating 10L o2    Active Problems Being Managed:     -Acute on chronic hypoxemic and hypercapnic respiratory failure.  - Acute metabolic encephalopathy.  - Widely metastatic stage IV breast cancer.  - Type 2 diabetes mellitus.  - Morbid obesity.  - History of pulmonary embolism.  -Poor prognosis.    Assessment/Plan:     Acute on chronic hypoxic, hypercapnic respiratory failure:   -Initially from COPD exacerbation, has history of severe COPD, now has left lung collapse.  -Continue aggressive pulmonary clearance.  Started on Mucomyst nebulization, hypertonic saline, chest physiotherapy and scheduled DuoNeb.  -Continue BiPAP support PRN.  -Repeat CXR.  -Poor candidate for intubation given exceptionally poor prognosis. Family want to go home with 
                 Critical Care Progress Note  Nate Washington MD          Date of Service:  3/13/2025  NAME:  Jessica Mane  :  1970  MRN:  [id number]      Subjective/Hospital course:      Acute on chronic hypoxic, hypercapnic respiratory failure   HFpEF with exacerbation     - Sitting comfortably on the bed, used BiPAP overnight.   : On 6 lit oxygen. Awake and alert, no respiratory distress.   3/8: Patient was transferred out of ICU on .  Today she is on BiPAP and chest x-ray shows left lung collapse.  Critical care consult was again requested and I decided to transfer back to ICU.  She looks comfortable.  No respiratory distress.  Currently on 50% FiO2.  3/9: Patient remains on BiPAP support.  Awake and follows commands.  3/10: Patient remains on BiPAP support.  Currently on 50% FiO2.  3/11: Patient is currently on high flow oxygen. Was on BiPAP at night.   3/12:Patient off of BiPAP this morning, on mid flow oxygen. Awake and alert.  3/13: Patient is currently on 10 L oxygen.  Awake and alert.       Active Problems Being Managed:     -Acute on chronic hypoxemic and hypercapnic respiratory failure.  - Acute metabolic encephalopathy.  - Widely metastatic stage IV breast cancer.  - Type 2 diabetes mellitus.  - Morbid obesity.  - History of pulmonary embolism.  -Poor prognosis.    Assessment/Plan:     Acute on chronic hypoxic, hypercapnic respiratory failure:   -Initially from COPD exacerbation, has history of severe COPD, now has left lung collapse.  -Continue aggressive pulmonary clearance.  Started on Mucomyst nebulization, hypertonic saline, chest physiotherapy and scheduled DuoNeb.  -Continue BiPAP support PRN.  -Atelectasis is improving.   She is a high risk intubation and also her chances of eventual extubation is extremely low and has very poor prognosis.   -Repeat CXR.  -Poor candidate for intubation given exceptionally poor prognosis    Widely metastatic stage 4 breast cancer: Poorly 
                 Critical Care Progress Note  Nate Washington MD          Date of Service:  3/14/2025  NAME:  Jessica Mane  :  1970  MRN:  [id number]      Subjective/Hospital course:      Acute on chronic hypoxic, hypercapnic respiratory failure   HFpEF with exacerbation     - Sitting comfortably on the bed, used BiPAP overnight.   : On 6 lit oxygen. Awake and alert, no respiratory distress.   3/8: Patient was transferred out of ICU on .  Today she is on BiPAP and chest x-ray shows left lung collapse.  Critical care consult was again requested and I decided to transfer back to ICU.  She looks comfortable.  No respiratory distress.  Currently on 50% FiO2.  3/9: Patient remains on BiPAP support.  Awake and follows commands.  3/10: Patient remains on BiPAP support.  Currently on 50% FiO2.  3/11: Patient is currently on high flow oxygen. Was on BiPAP at night.   3/12:Patient off of BiPAP this morning, on mid flow oxygen. Awake and alert.  3/13: Patient is currently on 10 L oxygen.  Awake and alert.  3/14: Patient is awake and alert.  Now off of BiPAP, on 10 L oxygen.  Has been using BiPAP as needed.       Active Problems Being Managed:     -Acute on chronic hypoxemic and hypercapnic respiratory failure.  - Acute metabolic encephalopathy.  - Widely metastatic stage IV breast cancer.  - Type 2 diabetes mellitus.  - Morbid obesity.  - History of pulmonary embolism.  -Poor prognosis.    Assessment/Plan:     Acute on chronic hypoxic, hypercapnic respiratory failure:   -Initially from COPD exacerbation, has history of severe COPD, now has left lung collapse.  -Continue aggressive pulmonary clearance.  Started on Mucomyst nebulization, hypertonic saline, chest physiotherapy and scheduled DuoNeb.  -Continue BiPAP support PRN.  -Repeat CXR.  -Poor candidate for intubation given exceptionally poor prognosis. Family want to go home with hospice care eventually.    Widely metastatic stage 4 breast cancer: Poorly 
      Hospitalist Progress Note    NAME:   Jessica Mane   : 1970   MRN: [id number]     Date/Time: 2025 8:27 AM  Patient PCP: No primary care provider on file.    Estimated discharge date:   Barriers: Improvement in dyspnea, pulmonology clearance      Assessment / Plan:  Jessica Mane is a 52 yo female with a PMH of widely metastatic breast cancer on anastrazole, COPD w chronic hypoxia on 5L/min NC, HFpEF, TERRA non-compliant with CPAP, chronic pain on methadone, Hx of PE on eliquis. She presented to the ED at [facility] with SOB, weakness and fatigue. She was just discharged from [facility] on 2/10 after being treated for flu c/b bacterial PNA with CT findings of subtotal LLL atelectasis. Upon presentation today, CXR shows new pleural effusion and remonstration of L sided atelectasis.  She was hypercapnic, but conversant 7.16/95/39 and placed on BiPAP.  She is being admitted to the ICU.     Regarding breast cancer history, she underwent radiotherapy for bony mets, but it appears has been largely lost to follow-up.  The last note from rad-onc in  notes that she was not following up with medical oncology.  She was unable to name her oncologist and said she has \"not talked to him in a while\".  Her son was unable to name the oncologist and said he had never talked to him.  I asked the patient her understanding of her prognosis, specifically asking if she felt the cancer was curable.  She said that she did believe she would recover from the cancer and that she was taking a pill daily in hopes of this.  The patient said that she had recently decided to change her code status, such that if anything happened she wanted chest compressions and to be put on the ventilator.  I spoke with the patient's son.  Similarly, he said that he was hopeful the patient's cancer was curable.  I explained records indicated that her cancer was widespread and she was on therapy with palliative (non-curative intent).  He reaffirmed she 
      Hospitalist Progress Note    NAME:   Jessica Mane   : 1970   MRN: [id number]     Date/Time: 2025 7:40 AM  Patient PCP: No primary care provider on file.    Estimated discharge date: 3/1  Barriers: Improvement in dyspnea and mental status, pulmonology clearance      Assessment / Plan:  Jessica Mnae is a 54 yo female with a PMH of widely metastatic breast cancer on anastrazole, COPD w chronic hypoxia on 5L/min NC, HFpEF, TERRA non-compliant with CPAP, chronic pain on methadone, Hx of PE on eliquis. She presented to the ED at [facility] with SOB, weakness and fatigue. She was just discharged from [facility] on 2/10 after being treated for flu c/b bacterial PNA with CT findings of subtotal LLL atelectasis. Upon presentation today, CXR shows new pleural effusion and remonstration of L sided atelectasis.  She was hypercapnic, but conversant 7.16/39 and placed on BiPAP.  She is being admitted to the ICU.     Regarding breast cancer history, she underwent radiotherapy for bony mets, but it appears has been largely lost to follow-up.  The last note from rad-onc in  notes that she was not following up with medical oncology.  She was unable to name her oncologist and said she has \"not talked to him in a while\".  Her son was unable to name the oncologist and said he had never talked to him.  I asked the patient her understanding of her prognosis, specifically asking if she felt the cancer was curable.  She said that she did believe she would recover from the cancer and that she was taking a pill daily in hopes of this.  The patient said that she had recently decided to change her code status, such that if anything happened she wanted chest compressions and to be put on the ventilator.  I spoke with the patient's son.  Similarly, he said that he was hopeful the patient's cancer was curable.  I explained records indicated that her cancer was widespread and she was on therapy with palliative (non-curative intent).  
      Hospitalist Progress Note    NAME:   Jessica Mane   : 1970   MRN: [id number]     Date/Time: 3/7/2025 11:39 AM  Patient PCP: No primary care provider on file.    Estimated discharge date:3/6/25  Barriers: Yves lift, BiPAP machine             Assessment / Plan:  Jessica Mane is a 54 yo female with a PMH of widely metastatic breast cancer on anastrazole, COPD w chronic hypoxia on 5L/min NC, HFpEF, TERRA non-compliant with CPAP, chronic pain on methadone, Hx of PE on eliquis. She presented to the ED at [facility] with SOB, weakness and fatigue. She was just discharged from [facility] on 2/10 after being treated for flu c/b bacterial PNA with CT findings of subtotal LLL atelectasis. Upon presentation today, CXR shows new pleural effusion and remonstration of L sided atelectasis.  She was hypercapnic, but conversant 7./39 and placed on BiPAP.  She is being admitted to the ICU.     Regarding breast cancer history, she underwent radiotherapy for bony mets, but it appears has been largely lost to follow-up.  The last note from rad-onc in  notes that she was not following up with medical oncology.  She was unable to name her oncologist and said she has \"not talked to him in a while\".  Her son was unable to name the oncologist and said he had never talked to him.  I asked the patient her understanding of her prognosis, specifically asking if she felt the cancer was curable.  She said that she did believe she would recover from the cancer and that she was taking a pill daily in hopes of this.  The patient said that she had recently decided to change her code status, such that if anything happened she wanted chest compressions and to be put on the ventilator.  I spoke with the patient's son.  Similarly, he said that he was hopeful the patient's cancer was curable.  I explained records indicated that her cancer was widespread and she was on therapy with palliative (non-curative intent).  He reaffirmed she should be a 
      Hospitalist Progress Note    NAME:   Jessica Mane   : 1970   MRN: [id number]     Date/Time: 3/19/2025 5:41 PM  Patient PCP: No primary care provider on file.    Estimated discharge date:  Barriers:       Assessment / Plan:    Acute on chronic respiratory failure with hypoxia and hypercapnia POA  Obesity hypoventilation syndrome  Acute metabolic encephalopathy-now improved  Metastatic breast cancer-primary diagnosis for hospice admission  Type 2 diabetes mellitus  Morbid obesity BMI 51.03  History of pulmonary embolism  Severe deconditioned status  Pain due to metastatic cancer  Chronic opioid dependence for cancer related pain    -Continue BiPAP support with oxygen supplementation.   working on home arrangement for BiPAP  -Continue arformoterol-budesonide nebulization  -Continue methadone for pain control-5 Mg twice daily  -Continue  Anastrozole   -Continue with Apixaban   - Patient requesting for salazar placement for comfort- ordered  - CM working on placement.  - Had meeting with family member , CM and palliative on 3/19.      Medical Decision Making:   I personally reviewed labs: Yes from admission  I personally reviewed imaging: Yes from admission  I personally reviewed EKG:  Toxic drug monitoring:   Discussed case with: Patient, family member, CM, palliative team        Code Status: DNR  DVT Prophylaxis:   GI Prophylaxis:    Subjective:     Chief Complaint / Reason for Physician Visit  \" Patient had questions regarding arrangement-answered most of them\".  Discussed with RN events overnight.       Objective:     VITALS:   Last 24hrs VS reviewed since prior progress note. Most recent are:  Patient Vitals for the past 24 hrs:   BP Temp Temp src Pulse Resp SpO2   25 1600 -- -- -- -- -- (!) 89 %   25 1505 (!) 100/52 98.2 °F (36.8 °C) Axillary 90 14 (!) 89 %   25 1256 -- -- -- -- 18 --   25 1219 -- -- -- 97 12 90 %   25 1115 98/65 98.3 °F (36.8 °C) Oral 93 16 
     Hospitalist Progress Note     Demographics    Patient Name  Jessica Mane   Date of Birth 1970   Medical Record Number  [id number]      Age  54 y.o.   PCP No primary care provider on file.   Admit date:  2/22/2025  9:20 PM     Room Number  2319/01  @ [facility]           Admission Diagnoses:  Hypercapnic respiratory failure (HCC)   Admission Summary:  \" Jessica Mane is a 52 yo female with a PMH of widely metastatic breast cancer on anastrazole, COPD w chronic hypoxia on 5L/min NC, HFpEF, TERRA non-compliant with CPAP, chronic pain on methadone, Hx of PE on eliquis. She presented to the ED at [facility] with SOB, weakness and fatigue. She was just discharged from [facility] on 2/10 after being treated for flu c/b bacterial PNA with CT findings of subtotal LLL atelectasis. Upon presentation today, CXR shows new pleural effusion and remonstration of L sided atelectasis.  She was hypercapnic, but conversant 7.16/95/39 and placed on BiPAP.  She is being admitted to the ICU.     Regarding breast cancer history, she underwent radiotherapy for bony mets, but it appears has been largely lost to follow-up.  The last note from rad-onc in 2023 notes that she was not following up with medical oncology.  She was unable to name her oncologist and said she has \"not talked to him in a while\".  Her son was unable to name the oncologist and said he had never talked to him.  I asked the patient her understanding of her prognosis, specifically asking if she felt the cancer was curable.  She said that she did believe she would recover from the cancer and that she was taking a pill daily in hopes of this.  The patient said that she had recently decided to change her code status, such that if anything happened she wanted chest compressions and to be put on the ventilator.  I spoke with the patient's son.  Similarly, he said that he was hopeful the patient's cancer was curable.  I explained records indicated that her 
     Hospitalist Progress Note     Demographics    Patient Name  Jessica Mane   Date of Birth 1970   Medical Record Number  [id number]      Age  54 y.o.   PCP No primary care provider on file.   Admit date:  2/22/2025  9:20 PM     Room Number  2512/01  @ [facility]           Admission Diagnoses:  Hypercapnic respiratory failure (HCC)   Admission Summary:  \" Jessica Mane is a 52 yo female with a PMH of widely metastatic breast cancer on anastrazole, COPD w chronic hypoxia on 5L/min NC, HFpEF, TERRA non-compliant with CPAP, chronic pain on methadone, Hx of PE on eliquis. She presented to the ED at [facility] with SOB, weakness and fatigue. She was just discharged from [facility] on 2/10 after being treated for flu c/b bacterial PNA with CT findings of subtotal LLL atelectasis. Upon presentation today, CXR shows new pleural effusion and remonstration of L sided atelectasis.  She was hypercapnic, but conversant 7.16/95/39 and placed on BiPAP.  She is being admitted to the ICU.     Regarding breast cancer history, she underwent radiotherapy for bony mets, but it appears has been largely lost to follow-up.  The last note from rad-onc in 2023 notes that she was not following up with medical oncology.  She was unable to name her oncologist and said she has \"not talked to him in a while\".  Her son was unable to name the oncologist and said he had never talked to him.  I asked the patient her understanding of her prognosis, specifically asking if she felt the cancer was curable.  She said that she did believe she would recover from the cancer and that she was taking a pill daily in hopes of this.  The patient said that she had recently decided to change her code status, such that if anything happened she wanted chest compressions and to be put on the ventilator.  I spoke with the patient's son.  Similarly, he said that he was hopeful the patient's cancer was curable.  I explained records indicated that her 
     Hospitalist Progress Note     Demographics    Patient Name  Jessica Mane   Date of Birth 1970   Medical Record Number  [id number]      Age  54 y.o.   PCP No primary care provider on file.   Admit date:  2/22/2025  9:20 PM     Room Number  2319/01  @ [facility]           Admission Diagnoses:  Hypercapnic respiratory failure (HCC)   Admission Summary:  \" Jessica Mane is a 52 yo female with a PMH of widely metastatic breast cancer on anastrazole, COPD w chronic hypoxia on 5L/min NC, HFpEF, TERRA non-compliant with CPAP, chronic pain on methadone, Hx of PE on eliquis. She presented to the ED at [facility] with SOB, weakness and fatigue. She was just discharged from [facility] on 2/10 after being treated for flu c/b bacterial PNA with CT findings of subtotal LLL atelectasis. Upon presentation today, CXR shows new pleural effusion and remonstration of L sided atelectasis.  She was hypercapnic, but conversant 7.16/95/39 and placed on BiPAP.  She is being admitted to the ICU.     Regarding breast cancer history, she underwent radiotherapy for bony mets, but it appears has been largely lost to follow-up.  The last note from rad-onc in 2023 notes that she was not following up with medical oncology.  She was unable to name her oncologist and said she has \"not talked to him in a while\".  Her son was unable to name the oncologist and said he had never talked to him.  I asked the patient her understanding of her prognosis, specifically asking if she felt the cancer was curable.  She said that she did believe she would recover from the cancer and that she was taking a pill daily in hopes of this.  The patient said that she had recently decided to change her code status, such that if anything happened she wanted chest compressions and to be put on the ventilator.  I spoke with the patient's son.  Similarly, he said that he was hopeful the patient's cancer was curable.  I explained records indicated that her 
   02/22/25 5639   Treatment Team Notification   Reason for Communication Critical results   Type of Critical Result POC test   Person Result Received From CHINA Lin RRT   Critical POC Result Type Arterial blood gas;pH   Name of Team Member Notified MARIILN Turk MD   Treatment Team Role Attending Provider   Method of Communication Face to face   Response At bedside   Notification Time [id number]       
.End of Shift Note    Bedside shift change report given to ranjit (oncoming nurse) by Hever Hayward RN (offgoing nurse).  Report included the following information SBAR and MAR    Shift worked:  7p-7a     Shift summary and any significant changes:     none     Concerns for physician to address:  none     Zone phone for oncoming shift:   [phone]       Activity:  Level of Assistance: Maximum assist, patient does 25-49%  Number times ambulated in hallways past shift: 0  Number of times OOB to chair past shift: 0    Cardiac:   Cardiac Monitoring: Yes      Cardiac Rhythm: Sinus rhythm    Access:  Current line(s): PIV     Genitourinary:   Urinary Status: External catheter, Incontinent    Respiratory:   O2 Device: High flow nasal cannula  Chronic home O2 use?: YES  Incentive spirometer at bedside: YES    GI:  Last BM (including prior to admit): 03/17/25  Current diet:  ADULT DIET; Regular  ADULT ORAL NUTRITION SUPPLEMENT; Breakfast; Diabetic Oral Supplement  Passing flatus: YES    Pain Management:   Patient states pain is manageable on current regimen: YES    Skin:  Gold Scale Score: 13  Interventions: Wound Offloading (Prevention Methods): Elevate heels, Pillows, Repositioning, Turning    Patient Safety:  Fall Risk: Nursing Judgement-Fall Risk High(Add Comments): Yes  Fall Risk Interventions  Nursing Judgement-Fall Risk High(Add Comments): Yes  Toilet Every 2 Hours-In Advance of Need: Yes  Hourly Visual Checks: Awake, In bed  Fall Visual Posted: Armband, Fall sign posted  Room Door Open: Deferred to promote rest  Alarm On: Bed  Patient Moved Closer to Nursing Station: No    Active Consults:   IP CONSULT TO PALLIATIVE CARE  IP CONSULT TO PULMONOLOGY  IP CONSULT TO CASE MANAGEMENT  IP CONSULT TO CASE MANAGEMENT  IP CONSULT TO CASE MANAGEMENT  IP CONSULT TO PALLIATIVE CARE  IP CONSULT TO CASE MANAGEMENT  IP CONSULT TO HOSPICE    Length of Stay:  Expected LOS: 24  Actual LOS: 23    Hever Hayward, 
0147 - Report received from JULY Islas in the ER. Patient's due medications and blood work will be completed before coming up to the unit per RN.    0216 - Patient arrived on the unit at this time. Patient agitated and uncooperative.    0230 - Admission assessment completed at this time. See flowsheets for details.    0400 - Handoff report given at this time to JULY James.           
0400: Bedside and Verbal shift change report given to Erika (oncoming nurse) by Fany (offgoing nurse). Report included the following information Nurse Handoff Report, Adult Overview, Recent Results, Cardiac Rhythm NSR/ST, Quality Measures, Neuro Assessment, and Event Log.     0500: shift assessment completed, see flowsheets.    0700: Bedside and Verbal shift change report given to Claritza (oncoming nurse) by Erika (offgoing nurse). Report included the following information Nurse Handoff Report, Adult Overview, Intake/Output, Recent Results, Cardiac Rhythm NSR/ST, Neuro Assessment, and Event Log.     
0700: Bedside report received from JULY Robison    0900: Assessment complete - see flowsheet. Pt refused to turn, pt educated on the importance of turning to prevent pressure injuries.    1008: IDR held- Plan for today is to txf out of icu and restart lovenox.    1200: Reassessment complete - no acute changes at this time    1600: Reassessment complete - no acute changes at this time    1630: Report called to JULY Sheldon  
0700: Bedside report received from JULY Wan    0828: Map <65, MD Felix made aware and orders received to start levophed gtt.     0900: Assessment complete - see flowsheet     1115: IDR held- Plan for today is to have family meeting, stop maintance fluids, obtain cxr, and add bowel regiment.    1200: Reassessment complete - no acute changes at this time     1500: Levophed stopped.    1600: Reassessment complete - no acute changes at this time     1800: While bathing patient, new hematoma discovered on patient's left ankle. Picture taken.     1900: Bedside report given to JULY Wan  
0700: Report received from JULY Bowen.    0800: Shift assessment completed, see flowsheets.    1200: Reassessment completed, see flowsheets.    1339: Update given to Ariana, family member listed in chart, over the phone.    1600: Reassessment completed, no change.    1620: Son at bedside for visit.     1830: Bladder scan d/t no UO, scan volume 221 mL.    1905: Report given to JULY Martinez.  
0700: Report received from JULY Martinez.    0800: Shift assessment completed, see flowsheets.    1010: IDR completed.     1200: Reassessment completed, see flowsheets.    1415: Diet ordered, soft and bite sized pending Speech Path.    1600: Reassessment completed, see flowsheets.    1905: Report given to JULY Wan.  
0700: Report received from JULY Velasquez.    0800: Shift assessment completed, see flowsheets.    1200: Reassessment completed, see flowsheets.    1230: Pt demanded to be back on left side. Endorses lower back and leg pain. Lidocaine Patch applied to back.    1430: Pt refused R side turn, cited pain and discomfort. Discussed turning practices and needs, as well as inability to turn supine. Photo of wound shown per patient request.    1600: Reassessment completed, see flowsheet. Pt refused turn again.    1800: Turn refused by pt, still on L side. MD Alesha made aware of trending hypotension.    1830: Pt convinced to turn on R side, partially supine, with R sided pillows and left sided wedges.    1905: Report given to JULY Osorio.  
0800: Pt refused dual RN skin check and repositioning.    1030: Orders received to remove bipap and place pt on NC.      1045: Pt's O2 85%, Dr. Wayne notified, orders received for midflo.     1600: Pt states she is unsure of home home med list, unable to complete at this time.     Shift Summary: Pt refused turns and skin checks despite education, charge RN notified. Wound care consulted, venelex ordered. Pt placed back on bipap for naps, otherwise remained on midflow throughout the day.   
0945 refused all p.o. meds.  Pushing nurse away. Confused. Wants to know\"why didn't you just let me go naturally!\".  Precedx increased from 0.6 to 0.7.   1000 refuses to let nurse put on BIPAP. Currently on 6 L NC.     1300  patient trying to hit nurse with call bell. Yelling that we re not letting her talk to family. Thinks nurse is trying to kill her. Precedex increased to 0.7.  called family. Her brother Talia coming in.       
1319-TRANSFER - IN REPORT:    Verbal report received from JULY Porter on Jessica Mane  being received from PCU for urgent transfer      Report consisted of patient's Situation, Background, Assessment and   Recommendations(SBAR).     Information from the following report(s) Nurse Handoff Report, Intake/Output, MAR, Recent Results, and Cardiac Rhythm -NSR  was reviewed with the receiving nurse.    Opportunity for questions and clarification was provided.      1410-Patient transferred to CCU 2512 on Bi-PAP. Intermittently yelling while being bathed and turned and yelled \"Where am I?\" Reoriented to being in the hospital and just transferred to CCU. Dual skin assessment completed with JULY Lozada.    1535-Bedside shift change report given to JULY Rizzo and JULY Lucas (oncoming nurse) by JULY Lazar (offgoing nurse). Report included the following information SBAR, Intake/Output, MAR, Recent Results, and Cardiac Rhythm -NSR .  
1416: Transfer out report called to Bandar MCDOWELL on PCU.      
1457: Patient was complaining that she can not hear from rt. Ear and wanted to talk to Doctor, so messaged Dr. Calzada, he said nothing really can be done  he will see her tomorrow.    End of Shift Note    Bedside shift change report given to Cuauhtemoc MCDOWELL (oncoming nurse) by Evan Mathews RN (offgoing nurse).  Report included the following information SBAR, MAR, and Cardiac Rhythm Normal sinus to sinus tachy    Shift worked:  7a-7.30p     Shift summary and any significant changes:     - Waiting to discharge home hospice.   - Dilaudid X 1 for pain.     Concerns for physician to address:       Zone phone for oncoming shift:          Activity:  Level of Assistance: Maximum assist, patient does 25-49%  Number times ambulated in hallways past shift: 0  Number of times OOB to chair past shift: 0    Cardiac:   Cardiac Monitoring: Yes      Cardiac Rhythm: Sinus rhythm    Access:  Current line(s): PIV     Genitourinary:   Urinary Status: Voiding, External catheter    Respiratory:   O2 Device: High flow nasal cannula  Chronic home O2 use?: YES  Incentive spirometer at bedside: YES    GI:  Last BM (including prior to admit): 03/18/25  Current diet:  ADULT DIET; Regular  ADULT ORAL NUTRITION SUPPLEMENT; Breakfast; Diabetic Oral Supplement  Passing flatus: YES    Pain Management:   Patient states pain is manageable on current regimen: YES    Skin:  Gold Scale Score: 13  Interventions: Wound Offloading (Prevention Methods): Elevate heels, Pillows, Repositioning, Turning    Patient Safety:  Fall Risk: Nursing Judgement-Fall Risk High(Add Comments): Yes  Fall Risk Interventions  Nursing Judgement-Fall Risk High(Add Comments): Yes  Toilet Every 2 Hours-In Advance of Need: Yes  Hourly Visual Checks: Awake, In bed  Fall Visual Posted: Armband, Fall sign posted  Room Door Open: Deferred to promote rest  Alarm On: Bed  Patient Moved Closer to Nursing Station: No    Active Consults:   IP CONSULT TO PALLIATIVE CARE  IP CONSULT TO 
1600 Received report from JULY Jones. Patient has been refusing care and very irritable. STAT ABG done.     1745 Bladder scan patient had 710 ml retention. Staright cath x 1 800 ml out. Repositioned patient.   
1900  Report received from JULY Hwang.      1920  Patient has a diet and is eating.  Messaged APOLLO Gamboa to inquire about changing blood glucose checks to ACHS.  NP to modify.      2005  Patient's BP is soft.  SBP at 1800 was 77.  Metoprolol 25 mg scheduled tonight.  Messaged APOLLO Gamboa to inquire about giving vs holding the dose.  NP to modify medication to 12.5 mg.      2345  Patient complained of 7/10 throbbing pain to her legs and back.  Requesting Dilaudid.  PRN PO dose administered.      0000  Patient refused to turn to her right side.  Educated patient on importance of turning in prevention of skin breakdown.  Patient verbalized understanding, but stated that her legs hurt when turned to the right.  Advised patient that she just received pain medication, which would help alleviate leg pain.  Patient still declined repositioning to her right side.      0200  Patient refused to turn/change position in bed.  Educated patient on importance of turning in prevention of skin breakdown.  Patient verbalized understanding, but still declined repositioning.     0400  Patient refused to turn/change position in bed.  Educated patient on importance of turning in prevention of skin breakdown.  Patient verbalized understanding, but still declined repositioning.     0500  Patient has not voided this shift.  Does not endorse need to void or discomfort.  Bladder scan showed 445 mL of urine.  Straight cath performed.  450 mL drained.  APOLLO Gamboa notified.  Patient requested to be returned to her left side despite education on importance of position changes.  Wound care completed.  Sacrum/buttocks/perineum cleansed with soap and water, then lathered in zinc cream.  Legs/feet cleansed with soap and water, then lathered with Lac-Hydrin cream.      0505  APOLLO Gamboa notified that patient's BP's have been on the lower side, but MAP is > 65.  No new orders received as patient is asymptomatic and resting.      0600  
1900 Bedside and Verbal shift change report given to Savage RN  (oncoming nurse) by Jaiden MCDOWELL  (offgoing nurse). Report included the following information Intake/Output, MAR, Recent Results, Med Rec Status, Cardiac Rhythm sinus with PACs , and Alarm Parameters.     2000 on assessment pt is alert and oriented, pt has family by the bedside, pt t max is 99.9, pt has been repositioned at this time,    0000 chg bath given, pt is resting at this time    0400 labs sent     0700 report given to the dayshift RN            
1900 Bedside and Verbal shift change report given to Savage RN (oncoming nurse) by Malick RN  (offgoing nurse). Report included the following information Intake/Output, MAR, Recent Results, Med Rec Status, Cardiac Rhythm sinus to sinus tach, and Alarm Parameters.     2000 pt is alert and oriented x 3, pt is on midflow, pt has been repositioned at this time,pt is afebrile,     0000 pt is resting at this time    0400 labs sent awaiting results     0700 report given to the dayshift RN   
1900- Report received from JULY Pichardo.    2020- Shift assessment completed.    2300- Patient bathed.  Linens changed.    2356- Reassessed.  No changes.  Patient has not voided.  258 cc in bladder.    0145- 312 cc in bladder.    0400- Reassessed.  No changes.  Labs drawn and sent.    0600- Bladder scan results reveal 400.  Patient has not voided all night.  Received orders to straight cath patient.  375 cc out.    0700- Report given to the oncoming shift.      
1900-Bedside shift change report given to  (oncoming nurse) by Jaiden (offgoing nurse). Report included the following information Nurse Handoff Report.      1920- Precedex stopped.    2056- Shift assessment completed.  BIPAP break given to allow patient to take medications and drink.  Patient placed on 6 liters.      2200- Patient placed back on BIPAP due to dyspnea and increased WOB.    2215- Telephone call made to APOLLO Wood regarding heart rate and lack of urine output today.  Orders received for a 500 cc fluid bolus.  Fluid bolus given.    2302- Precedex restarted due to patient's anxiety and restlessness.    2338- Reassessed.  Patient is still restless and anxious.  Repositioned patient for comfort.    0201- Precedex increased for RASS 1+.  Patient bathed. Linens changed.  Patient placed on 6 liters of oxygen during the bath.  BIPAP was replaced due to hypoxia and increased WOB.    0410- Labs drawn and sent.  Reassessed.  No changes.    0546- Precedex increased for restlessness.    0700- Report given to the oncoming shift.  
1900: Report received at bedside from JULY Jerry in SBAR format with all questions and concerns addressed.     2000: Pt assessment completed with findings documented, pt educated on importance of turning. Pt refusing telma care and changing purwick at this time.    2025: Pt requested to restart scheduled methadone, consulted APOLLO Santiago, new orders for methadone placed (see MAR).     0000: Reassessed pt, findings documented, pt educated on importance of turning.Pt refusing telma care and changing purwick at this time.    0400: Reassessed pt, findings documented, pt educated on importance of turning. Pt refusing telma care and changing purwick at this time.    0517: Contacted by pharmacy, pt's vancomycin level elevated, changed due time of scheduled vancomycin (see MAR).    0630: Pt refusing telma care and changing purwick at this time.    0700: Report given at bedside to JULY Cotton in SBAR format with all questions and concerns addressed.   
1915-Bedside and Verbal shift change report given to JULY Robison (oncoming nurse) by JULY Lozada (offgoing nurse). Report included the following information Nurse Handoff Report.     2000-Assessments and vitals noted, see flowsheets.     2200-Eyes closed, respirations even, no distress noted, refusing to wear bipap at this times, call light in reach.     0000-Assessments and vitals noted, continues to voice concern over missing credit cards and cash as discussed with previous shift today, no other complaints at this time, call light in reach, refusing turns.     0200-In bed eyes close, respirations unlabored on 6L midflow, easily arouses to name.     0400-Reassessments and vitals noted at this time, see flowsheets.     0630-Cleaned of incontinent void, new gown placed, purewick replaced, hob up, call light in reach.        
1920 Bedside and Verbal shift change report given to JULY Jara (oncoming nurse) by JULY Cotton (offgoing nurse). Report included the following information Nurse Handoff Report, Intake/Output, MAR, Recent Results, and Cardiac Rhythm Sinus Rhythm with frequent PACs .      2000 Shift assessment done. Patient alert and oriented x 4, follows commands. Patient on high flow nasal cannula at 10lpm. Lungs are very diminished upon auscultation. Pulses are palpable on all distal pulses. +3 edema non pitting on bilateral upper and lower extremities. Patient voiding via external catheter to lori, clear output. Dual skin assessment done with JULY Cotton. Dry flaky skin on both upper and lower extremities, both lower extremities yellow-brown in color. Excoriation noted at under breasts, abdominal pannus and perianal area. See flowsheets for details.    0000 Reassessment done. No change to previous assessment.     0400 Reassessment done. No change to previous assessment.    0720 Bedside and Verbal shift change report given to JULY Cotton (oncoming nurse) by JULY Jara (offgoing nurse). Report included the following information Nurse Handoff Report, Intake/Output, MAR, Recent Results, and Cardiac Rhythm Sinus Rhythm with frequent PACs .    
ADULT PROTOCOL: JET AEROSOL ASSESSMENT    Patient  Jessica Mane     54 y.o.   female     3/20/2025  7:43 AM    Breath Sounds Pre Procedure: Breath Sounds Pre-Tx NITO: Diminished                                  Breath Sounds Pre-Tx LLL: Diminished        Breath Sounds Pre-Tx RUL: Diminished        Breath Sounds Pre-Tx RML: Diminished        Breath Sounds Pre-Tx RLL: Diminished  Breath Sounds Post Procedure: Breath Sounds Post-Tx NITO: Diminished          Breath Sounds Post-Tx LLL: Diminished          Breath Sounds Post-Tx RUL: Diminished          Breath Sounds Post-Tx RML: Diminished          Breath Sounds Post-Tx RLL: Diminished                                     Heart Rate: Pre procedure Pre-Tx Pulse: 102           Post procedure Post-Tx Pulse: 99    Resp Rate: Pre procedure Pre-Tx Resps: 22           Post procedure Post-Tx Resps: 22    Oxygen: O2 Therapy: Oxygen humidified   nasal cannula     Changed: No    SpO2:  SpO2: 91 %   with Oxygen                Nebulizer Therapy: Current medications Medications: Budesonide/Formoterol      Changed: No      Problem List:   Patient Active Problem List   Diagnosis    Controlled diabetes mellitus type 2 with complications (HCC)    Pulmonary embolism (HCC)    Acute on chronic respiratory failure (HCC)    ACP (advance care planning)    Tachycardia    Chronic pain syndrome    Carcinoma of breast metastatic to multiple sites (HCC)    Acute and chronic respiratory failure with hypoxia (HCC)    Recurrent bacteremia    Coagulase negative Staphylococcus bacteremia    Aspiration pneumonia of both upper lobes due to gastric secretions (HCC)    Full code status    Palliative care encounter    Hypomagnesemia    Community acquired pneumonia of left lung, unspecified part of lung    Acute hypoxic respiratory failure (HCC)    Influenza A (H1N1)    Hypercapnic respiratory failure (HCC)    Pain due to malignant neoplasm metastatic to bone (HCC)    Carcinoma of breast metastatic to bone 
Attempted to place patient on BIPAP machine but she insisted she needed to be changed before being placed on BIPAP.  0120: Patient was changed and was short of breath. Patient wanted to wait before being placed on BIPAP.  0219: Patient on BIPAP.  
Attended Interdisciplinary rounds in CCU where patient care was discussed.      Visited by: Chaplain Jeffrey Avila M.Div., [facility].   Paging Service: [phone]  
Bedside and Verbal shift change report given to Rosario (oncoming nurse) by Ginger (offgoing nurse). Report included the following information Nurse Handoff Report, Intake/Output, MAR, Recent Results, Cardiac Rhythm NSR, Neuro Assessment, and Event Log.   PT on BiPAP    1100 pt off biPAP, on 6L NC, spO2 85-87%, resp therapy called, adjusting   1130 pt on midflow, see order, 15L spO2 90-91%  1200 pt refusing turn, refusing wound care  1515 93%, poor pleth when moving, denies shortness of breath. Refuses turn    
Bedside and Verbal shift change report given to Rosario (oncoming nurse) by Valentin (offgoing nurse). Report included the following information Nurse Handoff Report, Intake/Output, MAR, Recent Results, Cardiac Rhythm NSR, Neuro Assessment, and Event Log.   Pt did not allow skin check of both feet, stated she just wanted her skin left alone.Pt on nasal canula 5L at this time; difficult to get good pulse ox reading: best on finger shows 91-92%    1100 pt refused venelex to feet. She asked about rinsing out her ears as she says that was supposed to be done today. States she is not going to a SNF, she is going home. Asks for assistance with getting an electric Yves lift. Refuses turn, states she is comfortable on left side.    1330 Pt refused turn. Refused wound care to feet, stated she was comfortable.    1815 turned pt to clean her; pt incontinent of moderate sized BM. Hyperpigmentation noted to sacrum. Buttocks, groin, and entire left side moist and reddened. Pt continues to refuse turn. Partial bath given, pt refused full bath. Pt refused lotion to lower legs and feet. Soft cloth placed under right breast: moisture noted and telemetry electrode cushioned. Zinc paste applied liberally to sacrum, buttocks, and groin. Purwick replaced and telma care given. Large amount of urine noted to pad. Educated pt about turning and repositioning for skin care and pressure ulcer prevention. Pt refused pillow support or wedge turn. Pt refused pillows or foam to elevate heels off bed. Pt repositioned self on left side. Pt dyspneic after turns, pulse ox with poor reading but getting %.  
Bedside and Verbal shift change report given to Stacey RN (oncoming nurse) by Derek MCDOWELL (offgoing nurse). Report included the following information Nurse Handoff Report.         End of Shift Note    Bedside shift change report given to Marcela RN (oncoming nurse) by Stacey Woodard RN (offgoing nurse).  Report included the following information SBAR    Shift worked:  12a-7a     Shift summary and any significant changes:     Pt lethargic. Refusing turns      Concerns for physician to address:  See above     Zone phone for oncoming shift:          Activity:  Level of Assistance: Independent  Number times ambulated in hallways past shift: 0  Number of times OOB to chair past shift: 0    Cardiac:   Cardiac Monitoring: Yes      Cardiac Rhythm: Sinus rhythm    Access:  Current line(s): PIV     Genitourinary:   Urinary Status: External catheter, Voiding    Respiratory:   O2 Device: PAP (positive airway pressure)  Chronic home O2 use?: YES  Incentive spirometer at bedside: NO    GI:  Last BM (including prior to admit): 02/25/25  Current diet:  ADULT DIET; Regular; 4 carb choices (60 gm/meal)  ADULT ORAL NUTRITION SUPPLEMENT; Breakfast, Dinner; Wound Healing Oral Supplement  ADULT ORAL NUTRITION SUPPLEMENT; Breakfast, Dinner; Low Calorie/High Protein Oral Supplement  Passing flatus: YES    Pain Management:   Patient states pain is manageable on current regimen: YES    Skin:  Gold Scale Score: 13  Interventions: Wound Offloading (Prevention Methods): Pillows, Repositioning, Turning    Patient Safety:  Fall Risk: Nursing Judgement-Fall Risk High(Add Comments): Yes  Fall Risk Interventions  Nursing Judgement-Fall Risk High(Add Comments): Yes  Toilet Every 2 Hours-In Advance of Need: Yes  Hourly Visual Checks: Awake, In bed, Quiet  Fall Visual Posted: Armband, Fall sign posted, Socks  Room Door Open: Yes  Alarm On: Bed  Patient Moved Closer to Nursing Station: No    Active Consults:   IP CONSULT TO PALLIATIVE CARE  IP CONSULT TO 
Bedside shift change report given to Yury MCDOWELL (oncoming nurse) by Chapincito MCDOWELL (offgoing nurse). Report included the following information Nurse Handoff Report.     2394: Pt refusing staff assisted turn. Educated on importance r/t skin injury prevention.          
Bedside shift change report given to Yury RN (oncoming nurse) by Jo RN (offgoing nurse). Report included the following information Nurse Handoff Report.     0900: BG 78 this AM; Lantus 10 units held per Felix THEODORE. Holding Metoprolol 12.5mg PO r/t BP < 110 per Felix THEODORE.    1325: Pt has severe left hip, leg, butt, generalized pain. She is unable to have additional PRN medication at this time. D/w Felix THEODORE. Order received for Dilaudid 0.5mg IV ONCE.      
CPT not done due to patient laying on left side; CPT was not able to be performed properly. Patient requested not to be moved from laying on left side  
Chart reviewed, spoke to RN. Pt currently requiring BiPAP support as well as precedex and remains hypotensive. Pt not appropriate for participation in PT. Will defer however continue to follow.    Beata Sen, PT, DPT  
Chart reviewed. Pt remains medically inappropriate for participation with therapy as she is hypotensive and requiring 50L HHF, 40% FiO2. Noted that pt has met with palliative care and is interested in discharging home with hospice care. At baseline, pt is bed bound and uses karmen lift for bed <>wheelchair transfers and has 56hrs of caregiver assist + assist from family. Will complete PT order at this time.     Beata Sen, PT, DPT  
Comprehensive Nutrition Assessment    Type and Reason for Visit:  Reassess    Nutrition Recommendations/Plan:   Continue current diet and supplements      Malnutrition Assessment:  Malnutrition Status:  Insufficient data (02/24/25 1543)    Context:  Chronic Illness     Findings of the 6 clinical characteristics of malnutrition:  Energy Intake:  Unable to assess  Weight Loss:  Greater than 7.5% over 3 months     Body Fat Loss:  Unable to assess     Muscle Mass Loss:  Unable to assess    Fluid Accumulation:  Unable to assess     Strength:  Not Performed    Nutrition Assessment:    Chart reviewed; patient continues on a carb controlled diet. Good PO intake per flowsheets. ONS on board to help meet protein needs/optimize wound healing. Noted to be refusing turns. Patient discussed during PCU rounds; stable for discharge pending bipap approval. Patient with staff at bedside and still on bipap at time of attempted visit this morning. Continue current nutrition plan of care. Encourage PO intake and to order meals per her preference.     Patient Vitals for the past 120 hrs:   PO Meals Eaten (%)   03/03/25 1430 51 - 75%   02/28/25 1630 76 - 100%   02/28/25 1200 76 - 100%     Nutrition Related Findings:    Na 135, K+ 4.8, -[id number]   BM 3/4   1+ edema BUE, 2+ edema BLE   Lantus, Humalog, Probiotic, Methadone, Protonix   Wound Type: Stage II, Pressure Injury, Moisture Associate Skin Damage       Current Nutrition Intake & Therapies:    Average Meal Intake: 51-75%, %  Average Supplements Intake: Unable to assess  ADULT DIET; Regular; 4 carb choices (60 gm/meal)  ADULT ORAL NUTRITION SUPPLEMENT; Breakfast, Dinner; Wound Healing Oral Supplement  ADULT ORAL NUTRITION SUPPLEMENT; Breakfast, Dinner; Low Calorie/High Protein Oral Supplement    Anthropometric Measures:  Height: 165.1 cm (5' 5\")  Ideal Body Weight (IBW): 125 lbs (57 kg)       Current Body Weight: 142.1 kg (313 lb 4.4 oz), 251.5 % IBW. Weight Source: 
Comprehensive Nutrition Assessment    Type and Reason for Visit:  Reassess    Nutrition Recommendations/Plan:   Continue diet as tolerated  RD to add Glucerna TID  Please document % meals and supplements consumed in flowsheet I/O's under intake      Malnutrition Assessment:  Malnutrition Status:  Insufficient data (02/24/25 1543)    Context:  Chronic Illness     Findings of the 6 clinical characteristics of malnutrition:  Energy Intake:  Unable to assess  Weight Loss:  Greater than 7.5% over 3 months     Body Fat Loss:  Unable to assess     Muscle Mass Loss:  Unable to assess    Fluid Accumulation:  Unable to assess     Strength:  Not Performed    Nutrition Assessment:  Chart reviewed, case discussed during CCU rounds.  Pt speaking with Palliative at time of attempted visit.  She was BiPAP dependent the past several days, now on hiflow.  Diet started yesterday and SLP consulted.  She is considering hospice, awaiting final decision on GOC.  -172.  K 3.2, being repleted.  Levo at 3.  Will add PO supplements on all trays as RN reports her appetite has not been great since advancement.   Patient Vitals for the past 120 hrs:   PO Meals Eaten (%)   03/07/25 1816 26 - 50%            Nutrition Related Findings:    Meds: lantus, lispro, methadone, protonix, glycolax, KCl, senokot.  Edema: +2-generalized, +3-BUE, +4-BLE.  BM: 3/8 Wound Type: Stage II, Pressure Injury, Moisture Associate Skin Damage       Current Nutrition Intake & Therapies:    Average Meal Intake: 1-25%, 26-50%  Average Supplements Intake: None Ordered  ADULT ORAL NUTRITION SUPPLEMENT; Breakfast, Lunch, Dinner; Diabetic Oral Supplement  ADULT ORAL NUTRITION SUPPLEMENT; Breakfast, Lunch, Dinner; Diabetic Oral Supplement  ADULT DIET; Easy to Chew    Anthropometric Measures:  Height: 165.1 cm (5' 5\")  Ideal Body Weight (IBW): 125 lbs (57 kg)       Current Body Weight: 142.1 kg (313 lb 4.4 oz), 251.5 % IBW. Weight Source: Bed scale  Current BMI 
Comprehensive Nutrition Assessment    Type and Reason for Visit:  Reassess    Nutrition Recommendations/Plan:   Continue liberalized, regular diet  Glucerna daily      Malnutrition Assessment:  Malnutrition Status:  At risk for malnutrition (03/17/25 1140)    Context:  Chronic Illness     Findings of the 6 clinical characteristics of malnutrition:  Energy Intake:  Mild decrease in energy intake  Weight Loss:  Unable to assess     Body Fat Loss:  No body fat loss     Muscle Mass Loss:  No muscle mass loss    Fluid Accumulation:  Unable to assess     Strength:  Not Performed    Nutrition Assessment:    Chart reviewed and patient discussed during PCU rounds. Plans for discharge home with hospice when set up. Patient receiving a regular diet. She reports a fair appetite but complains of nausea/vomiting off and on. Drinking Glucerna as desired. Multiple bottles at bedside. RD discontinued duplicate orders and decreased to daily. Patient utilizing menu and ordering meals per her preference. Encouraged intake of meals as tolerated.    Nutrition Related Findings:    Labs and medications reviewed   Wound Type: Stage II, Pressure Injury, Moisture Associate Skin Damage       Current Nutrition Intake & Therapies:    Average Meal Intake: 26-50%, 51-75%  Average Supplements Intake: 26-50%, 51-75%  ADULT DIET; Regular  ADULT ORAL NUTRITION SUPPLEMENT; Breakfast; Diabetic Oral Supplement    Anthropometric Measures:  Height: 165.1 cm (5' 5\")  Ideal Body Weight (IBW): 125 lbs (57 kg)       Current Body Weight: 139.1 kg (306 lb 10.6 oz), 251.5 % IBW. Weight Source: Bed scale  Current BMI (kg/m2): 51           Weight Adjustment For: No Adjustment                 BMI Categories: Obese Class 3 (BMI 40.0 or greater)    Estimated Daily Nutrient Needs:  Energy Requirements Based On: Formula  Weight Used for Energy Requirements: Current  Energy (kcal/day): 2104 kcals (BMR x 1.2AF -300kcals)  Weight Used for Protein Requirements: 
Critical care interdisciplinary rounds today.  Following members present: Case Management, , Clinical Lead, Diabetes Team, Nursing, Nutrition, Pharmacy, and Physician.   Plan of care discussed.  See clinical pathway for plan of care and interventions and desired outcomes.   
End of Shift Note    Bedside shift change report given to JULY Martinez (oncoming nurse) by Moe Le RN (offgoing nurse).  Report included the following information SBAR, Intake/Output, MAR, Recent Results, Med Rec Status, and Cardiac Rhythm NSR    Shift worked:  [id number]     Shift summary and any significant changes:       Patient refused multiple turns throughout the night, education provided     CO2 critical on AM Labs, Covering provider notified       Concerns for physician to address:         Zone phone for oncoming shift:            Activity:  Level of Assistance: Independent  Number times ambulated in hallways past shift: 0  Number of times OOB to chair past shift: 0    Cardiac:   Cardiac Monitoring: Yes      Cardiac Rhythm: Sinus rhythm    Access:  Current line(s): PIV     Genitourinary:   Urinary Status: External catheter    Respiratory:   O2 Device: PAP (positive airway pressure)  Chronic home O2 use?: YES  Incentive spirometer at bedside: YES    GI:  Last BM (including prior to admit): 02/25/25  Current diet:  ADULT DIET; Regular; 4 carb choices (60 gm/meal)  ADULT ORAL NUTRITION SUPPLEMENT; Breakfast, Dinner; Wound Healing Oral Supplement  ADULT ORAL NUTRITION SUPPLEMENT; Breakfast, Dinner; Low Calorie/High Protein Oral Supplement  Passing flatus: YES    Pain Management:   Patient states pain is manageable on current regimen: YES    Skin:  Gold Scale Score: 13  Interventions: Wound Offloading (Prevention Methods): Pillows, Repositioning, Turning    Patient Safety:  Fall Risk: Nursing Judgement-Fall Risk High(Add Comments): Yes  Fall Risk Interventions  Nursing Judgement-Fall Risk High(Add Comments): Yes  Toilet Every 2 Hours-In Advance of Need: Yes  Hourly Visual Checks: Awake, In bed  Fall Visual Posted: Fall sign posted, Armband  Room Door Open: Deferred to promote rest  Alarm On: Bed  Patient Moved Closer to Nursing Station: No    Active Consults:   IP CONSULT TO PALLIATIVE CARE  IP CONSULT TO 
End of Shift Note    Bedside shift change report given to JULY Marx (oncoming nurse) by Beatrice Zuleta RN (offgoing nurse).  Report included the following information SBAR    Shift worked:  2101-7193     Shift summary and any significant changes:     This shift was eventful for patient being compliant with turns and changes. Patient did not go on BiPAP. However asked for it in the morning. RT notified. No other concerns      Concerns for physician to address:  None     Zone phone for oncoming shift:   [phone]       Activity:  Level of Assistance: Maximum assist, patient does 25-49%  Number times ambulated in hallways past shift: 0  Number of times OOB to chair past shift: 0    Cardiac:   Cardiac Monitoring: Yes      Cardiac Rhythm: Sinus tachy    Access:  Current line(s): PIV     Genitourinary:   Urinary Status: Voiding, External catheter    Respiratory:   O2 Device: High flow nasal cannula  Chronic home O2 use?: YES  Incentive spirometer at bedside: YES    GI:  Last BM (including prior to admit): 03/14/25  Current diet:  ADULT ORAL NUTRITION SUPPLEMENT; Breakfast, Lunch, Dinner; Diabetic Oral Supplement  ADULT ORAL NUTRITION SUPPLEMENT; Breakfast, Lunch, Dinner; Diabetic Oral Supplement  ADULT DIET; Regular  Passing flatus: YES    Pain Management:   Patient states pain is manageable on current regimen: YES    Skin:  Gold Scale Score: 15  Interventions: Wound Offloading (Prevention Methods): Bed, pressure redistribution/air, Pillows, Repositioning, Elevate heels    Patient Safety:  Fall Risk: Nursing Judgement-Fall Risk High(Add Comments): Yes  Fall Risk Interventions  Nursing Judgement-Fall Risk High(Add Comments): Yes  Toilet Every 2 Hours-In Advance of Need: Yes  Hourly Visual Checks: Eyes closed  Fall Visual Posted: Fall sign posted, Socks  Room Door Open: Yes  Alarm On: Bed  Patient Moved Closer to Nursing Station: No    Active Consults:   IP CONSULT TO PALLIATIVE CARE  IP CONSULT TO PULMONOLOGY  IP CONSULT 
End of Shift Note    Bedside shift change report given to JULY Pichardo (oncoming nurse) by Esha Chow RN (offgoing nurse).  Report included the following information SBAR, Kardex, Intake/Output, MAR, and Recent Results    Shift worked:  night     Shift summary and any significant changes:    Pt kept bipap on several hours overnight; no significant changes     Concerns for physician to address:    Zone phone for oncoming shift:       Activity:  Level of Assistance: Dependent, patient does less than 25%  Number times ambulated in hallways past shift: 0  Number of times OOB to chair past shift: 0    Cardiac:   Cardiac Monitoring: Yes      Cardiac Rhythm: Sinus rhythm    Access:  Current line(s): PIV     Genitourinary:   Urinary Status: Voiding, External catheter    Respiratory:   O2 Device: Nasal cannula  Chronic home O2 use?: YES  Incentive spirometer at bedside: YES    GI:  Last BM (including prior to admit): 02/25/25  Current diet:  ADULT DIET; Regular; 4 carb choices (60 gm/meal)  ADULT ORAL NUTRITION SUPPLEMENT; Breakfast, Dinner; Wound Healing Oral Supplement  ADULT ORAL NUTRITION SUPPLEMENT; Breakfast, Dinner; Low Calorie/High Protein Oral Supplement  Passing flatus: YES    Pain Management:   Patient states pain is manageable on current regimen: YES    Skin:  Gold Scale Score: 13  Interventions: Wound Offloading (Prevention Methods): Bed, pressure reduction mattress, Bed, pressure redistribution/air, Elevate heels, Repositioning, Turning    Patient Safety:  Fall Risk: Nursing Judgement-Fall Risk High(Add Comments): Yes  Fall Risk Interventions  Nursing Judgement-Fall Risk High(Add Comments): Yes  Toilet Every 2 Hours-In Advance of Need: Yes  Hourly Visual Checks: Awake  Fall Visual Posted: Armband  Room Door Open: Deferred for droplet isolation  Alarm On: Bed  Patient Moved Closer to Nursing Station: No    Active Consults:   IP CONSULT TO PALLIATIVE CARE  IP CONSULT TO PHARMACY    Length of Stay:  Expected 
End of Shift Note    Bedside shift change report given to JULY Post (oncoming nurse) by Moe Le RN (offgoing nurse).  Report included the following information SBAR, Intake/Output, MAR, Accordion, Recent Results, Med Rec Status, and Cardiac Rhythm NSR    Shift worked:  0887-6869     Shift summary and any significant changes:     Pt continues to refuse turns despite education and encouragement     1x BM this shift       Concerns for physician to address:         Zone phone for oncoming shift:            Activity:  Level of Assistance: Maximum assist, patient does 25-49%  Number times ambulated in hallways past shift: 0  Number of times OOB to chair past shift: 0    Cardiac:   Cardiac Monitoring: Yes      Cardiac Rhythm: Sinus rhythm    Access:  Current line(s): PIV     Genitourinary:   Urinary Status: Voiding, External catheter    Respiratory:   O2 Device: PAP (positive airway pressure)  Chronic home O2 use?: YES  Incentive spirometer at bedside: YES    GI:  Last BM (including prior to admit): 02/25/25  Current diet:  ADULT DIET; Regular; 4 carb choices (60 gm/meal)  ADULT ORAL NUTRITION SUPPLEMENT; Breakfast, Dinner; Wound Healing Oral Supplement  ADULT ORAL NUTRITION SUPPLEMENT; Breakfast, Dinner; Low Calorie/High Protein Oral Supplement  Passing flatus: YES    Pain Management:   Patient states pain is manageable on current regimen: YES    Skin:  Gold Scale Score: 14  Interventions: Wound Offloading (Prevention Methods): Bed, pressure reduction mattress, Repositioning, Pillows, Turning (Pt ecouraged and educated on turns, frequently refusing)    Patient Safety:  Fall Risk: Nursing Judgement-Fall Risk High(Add Comments): Yes  Fall Risk Interventions  Nursing Judgement-Fall Risk High(Add Comments): Yes  Toilet Every 2 Hours-In Advance of Need: Yes  Hourly Visual Checks: Awake, In bed  Fall Visual Posted: Fall sign posted, Armband, Socks  Room Door Open: Deferred to decrease stimulation  Alarm On: 
End of Shift Note    Bedside shift change report given to JULY Reed (oncoming nurse) by Magdalena Rodriguez RN (offgoing nurse).  Report included the following information SBAR    Shift worked:  7-11 pm     Shift summary and any significant changes:     Pt lethargic.     Concerns for physician to address:  Goals of care.     Zone phone for oncoming shift:   ?       Activity:  Level of Assistance: Independent  Number times ambulated in hallways past shift: 0  Number of times OOB to chair past shift: 0    Cardiac:   Cardiac Monitoring: Yes      Cardiac Rhythm: Sinus rhythm    Access:  Current line(s): PIV     Genitourinary:   Urinary Status: Voiding, External catheter    Respiratory:   O2 Device: PAP (positive airway pressure)  Chronic home O2 use?: YES  Incentive spirometer at bedside: NO    GI:  Last BM (including prior to admit): 02/25/25  Current diet:  ADULT DIET; Regular; 4 carb choices (60 gm/meal)  ADULT ORAL NUTRITION SUPPLEMENT; Breakfast, Dinner; Wound Healing Oral Supplement  ADULT ORAL NUTRITION SUPPLEMENT; Breakfast, Dinner; Low Calorie/High Protein Oral Supplement  Passing flatus: YES    Pain Management:   Patient states pain is manageable on current regimen: N/A    Skin:  Gold Scale Score: 13  Interventions: Wound Offloading (Prevention Methods): Pillows, Repositioning, Turning    Patient Safety:  Fall Risk: Nursing Judgement-Fall Risk High(Add Comments): Yes  Fall Risk Interventions  Nursing Judgement-Fall Risk High(Add Comments): Yes  Toilet Every 2 Hours-In Advance of Need: Yes  Hourly Visual Checks: Awake, In bed  Fall Visual Posted: Socks  Room Door Open: Yes  Alarm On: Bed  Patient Moved Closer to Nursing Station: No    Active Consults:   IP CONSULT TO PALLIATIVE CARE  IP CONSULT TO PHARMACY  IP CONSULT TO PULMONOLOGY    Length of Stay:  Expected LOS: 6  Actual LOS: 3    Magdalena Rodriguez RN                            
End of Shift Note    Bedside shift change report given to Moe MCDOWELL(oncoming nurse) by Marcela Singh, RN (offgoing nurse).  Report included the following information SBAR    Shift worked: 7a-7p     Shift summary and any significant changes:     Pt participated in therapy this shift. Pt had family meeting with Palliative SW.     Concerns for physician to address:       Zone phone for oncoming shift:          Activity:  Level of Assistance: Independent  Number times ambulated in hallways past shift: 0  Number of times OOB to chair past shift: 0    Cardiac:   Cardiac Monitoring: Yes      Cardiac Rhythm: Sinus rhythm    Access:  Current line(s): PIV     Genitourinary:   Urinary Status: External catheter    Respiratory:   O2 Device: Nasal cannula  Chronic home O2 use?: YES  Incentive spirometer at bedside: NO    GI:  Last BM (including prior to admit): 02/25/25  Current diet:  ADULT DIET; Regular; 4 carb choices (60 gm/meal)  ADULT ORAL NUTRITION SUPPLEMENT; Breakfast, Dinner; Wound Healing Oral Supplement  ADULT ORAL NUTRITION SUPPLEMENT; Breakfast, Dinner; Low Calorie/High Protein Oral Supplement  Passing flatus: YES    Pain Management:   Patient states pain is manageable on current regimen: YES    Skin:  Gold Scale Score: 13  Interventions: Wound Offloading (Prevention Methods): Pillows, Repositioning, Turning    Patient Safety:  Fall Risk: Nursing Judgement-Fall Risk High(Add Comments): Yes  Fall Risk Interventions  Nursing Judgement-Fall Risk High(Add Comments): Yes  Toilet Every 2 Hours-In Advance of Need: Yes  Hourly Visual Checks: Awake, In bed  Fall Visual Posted: Socks  Room Door Open: Deferred for droplet isolation  Alarm On: Bed  Patient Moved Closer to Nursing Station: No    Active Consults:   IP CONSULT TO PALLIATIVE CARE  IP CONSULT TO PHARMACY  IP CONSULT TO PULMONOLOGY    Length of Stay:  Expected LOS: 7  Actual LOS: 5    Marcela Singh, RN                            
End of Shift Note    Bedside shift change report given to Peggy (oncoming nurse) by Rosario Chandler RN (offgoing nurse).  Report included the following information SBAR, Intake/Output, MAR, Recent Results, and Cardiac Rhythm NSR -ST low 100s    Shift worked:  9169-2053     Shift summary and any significant changes:     Pt refusing turns and wound care. Educated pt, she refused. Care management working on home dme.     Concerns for physician to address:  x     Zone phone for oncoming shift:   x       Activity:  Level of Assistance: Maximum assist, patient does 25-49%  Number times ambulated in hallways past shift: 0  Number of times OOB to chair past shift: 0    Cardiac:   Cardiac Monitoring: Yes      Cardiac Rhythm: Sinus rhythm    Access:  Current line(s): PIV     Genitourinary:   Urinary Status: Voiding    Respiratory:   O2 Device: Nasal cannula  Chronic home O2 use?: YES  Incentive spirometer at bedside: YES    GI:  Last BM (including prior to admit): 03/03/25  Current diet:  ADULT DIET; Regular; 4 carb choices (60 gm/meal)  ADULT ORAL NUTRITION SUPPLEMENT; Breakfast, Dinner; Wound Healing Oral Supplement  ADULT ORAL NUTRITION SUPPLEMENT; Breakfast, Dinner; Low Calorie/High Protein Oral Supplement  Passing flatus: YES    Pain Management:   Patient states pain is manageable on current regimen: YES    Skin:  Gold Scale Score: 14  Interventions: Wound Offloading (Prevention Methods): Turning, Repositioning, Pillows, Bed, pressure reduction mattress    Patient Safety:  Fall Risk: Nursing Judgement-Fall Risk High(Add Comments): Yes  Fall Risk Interventions  Nursing Judgement-Fall Risk High(Add Comments): Yes  Toilet Every 2 Hours-In Advance of Need: Yes  Hourly Visual Checks: Awake, Quiet, In bed  Fall Visual Posted: Armband, Socks  Room Door Open: Deferred for droplet isolation  Alarm On: Bed  Patient Moved Closer to Nursing Station: No    Active Consults:   IP CONSULT TO PALLIATIVE CARE  IP CONSULT TO 
End of Shift Note    Bedside shift change report given to Will  (oncoming nurse) by Gurpreet Rosales RN (offgoing nurse).  Report included the following information SBAR    Shift worked:  7a7p     Shift summary and any significant changes:     Patient alert and oriented x3. No pain reported. Turned and repositioned. Had BM, eating well      Concerns for physician to address:       Zone phone for oncoming shift:          Activity:  Level of Assistance: Dependent, patient does less than 25%  Number times ambulated in hallways past shift: 0  Number of times OOB to chair past shift: 0    Cardiac:   Cardiac Monitoring: Yes      Cardiac Rhythm: Sinus rhythm    Access:  Current line(s): PIV    Genitourinary:   Urinary Status: Voiding, External catheter    Respiratory:   O2 Device: PAP (positive airway pressure)  Chronic home O2 use?: YES  Incentive spirometer at bedside: NO    GI:  Last BM (including prior to admit): 03/07/25  Current diet:  ADULT DIET; Regular; 4 carb choices (60 gm/meal)  ADULT ORAL NUTRITION SUPPLEMENT; Breakfast, Dinner; Wound Healing Oral Supplement  ADULT ORAL NUTRITION SUPPLEMENT; Breakfast, Dinner; Low Calorie/High Protein Oral Supplement  Passing flatus: YES    Pain Management:   Patient states pain is manageable on current regimen: YES    Skin:  Gold Scale Score: 13  Interventions: Wound Offloading (Prevention Methods): Elevate heels    Patient Safety:  Fall Risk: Nursing Judgement-Fall Risk High(Add Comments): Yes  Fall Risk Interventions  Nursing Judgement-Fall Risk High(Add Comments): Yes  Toilet Every 2 Hours-In Advance of Need: Yes  Hourly Visual Checks: Awake, In bed  Fall Visual Posted: Armband, Fall sign posted  Room Door Open: Deferred to decrease stimulation  Alarm On: Bed  Patient Moved Closer to Nursing Station: No    Active Consults:   IP CONSULT TO PALLIATIVE CARE  IP CONSULT TO PHARMACY  IP CONSULT TO PULMONOLOGY  IP CONSULT TO CASE MANAGEMENT  IP CONSULT TO CASE MANAGEMENT  IP 
End of Shift Note    Bedside shift change report given to hilda (oncoming nurse) by Hever Hayward RN (offgoing nurse).  Report included the following information SBAR, Kardex, MAR, and Recent Results    Shift worked:  7p-7a     Shift summary and any significant changes:     none     Concerns for physician to address:  none     Zone phone for oncoming shift:          Activity:  Level of Assistance: Maximum assist, patient does 25-49%  Number times ambulated in hallways past shift: 0  Number of times OOB to chair past shift: 0    Cardiac:   Cardiac Monitoring: Yes      Cardiac Rhythm: Sinus rhythm    Access:  Current line(s): PIV     Genitourinary:   Urinary Status: Voiding, External catheter, Incontinent    Respiratory:   O2 Device: High flow nasal cannula  Chronic home O2 use?: YES  Incentive spirometer at bedside: YES    GI:  Last BM (including prior to admit): 03/18/25  Current diet:  ADULT DIET; Regular  ADULT ORAL NUTRITION SUPPLEMENT; Breakfast; Diabetic Oral Supplement  Passing flatus: YES    Pain Management:   Patient states pain is manageable on current regimen: N/A    Skin:  Gold Scale Score: 12  Interventions: Wound Offloading (Prevention Methods): Elevate heels, Pillows, Repositioning, Turning    Patient Safety:  Fall Risk: Nursing Judgement-Fall Risk High(Add Comments): Yes  Fall Risk Interventions  Nursing Judgement-Fall Risk High(Add Comments): Yes  Toilet Every 2 Hours-In Advance of Need: Yes  Hourly Visual Checks: Awake, In bed  Fall Visual Posted: Armband, Fall sign posted  Room Door Open: Deferred to promote rest  Alarm On: Bed  Patient Moved Closer to Nursing Station: No    Active Consults:   IP CONSULT TO PALLIATIVE CARE  IP CONSULT TO PULMONOLOGY  IP CONSULT TO CASE MANAGEMENT  IP CONSULT TO CASE MANAGEMENT  IP CONSULT TO CASE MANAGEMENT  IP CONSULT TO PALLIATIVE CARE  IP CONSULT TO CASE MANAGEMENT  IP CONSULT TO HOSPICE    Length of Stay:  Expected LOS: 25  Actual LOS: 24    Hever BAZAN 
OCCUPATIONAL THERAPY EVALUATION/DISCHARGE  Patient: Jessica Mane (54 y.o. female)  Date: 3/1/2025  Primary Diagnosis: Hypercapnic respiratory failure (HCC) [J96.92]         Precautions: Isolation                  ASSESSMENT :  Based on the objective data below, the patient is at her PLOF for self care and related mobility bed level. She is able to complete UB tasks with setup assist and assists with rolling side/side to aide with LB self care (maximal assist). Patient reports she does not get up to the bathroom at home, uses briefs then nephew or caregiver assists with clean up. Has yves and wheelchair but reports manual yves to difficult for caregivers to use so she has not been getting OOB. Patient on 5 L oxygen (same as home) with saturations 93%-94%. Patient expresses concern re: no longer receiving hospice services/DME items will be picked up.    Functional Outcome Measure:  The patient scored 14/24 on the [facility] outcome measure which is indicative of likelihood of discharge to facility vs home at current functional level.      Further skilled acute occupational therapy is not indicated at this time.     PLAN :  Recommend with staff: yves if getting OOB    Recommendation for discharge: (in order for the patient to meet his/her long term goals):   No skilled occupational therapy    Other factors to consider for discharge:  appropriate DME. Patient reports hospice will be picking up her oxygen, CPAP and yves lift due to no longer receiving services    IF patient discharges home will need the following DME: motorized Yves lift vs crank, oxygen (patient reports she has contact [facility] for oxygen). States the hospital bed was not furnished by hospice so she will be able to keep the bed. May benefit from trapeze attachment to aide with repositioning    Jessica Mane requires a motorized patient lift for the transfer between bed and a wheelchair.   Caregivers not able to use crank yves due to too heavy. 
Palliative Medicine  Patient Name: Jessica Mane  YOB: 1970  MRN: [id number]  Age: 54 y.o.  Gender: female    Date of Initial Consult: 2/24/2025  Date of Service: 3/11/2025  Time: 4:20 PM  Provider: LUIS ANGEL Loredo NP  Hospital Day: 18  Admit Date: 2/22/2025  Referring Provider: Ignacia Santiago NP       Reasons for Consultation:  help with making end of life decisions    HISTORY OF PRESENT ILLNESS (HPI):   Jessica Mane is a 54 y.o. female with a past medical history ofwidely metastatic breast cancer on anastrazole, COPD w chronic hypoxia on 5L/min NC, HFpEF, TERRA non-compliant with CPAP, chronic pain on methadone, Hx of PE on eliquis, who was admitted on 2/22/2025 from home with a diagnosis of acute on chronic hypoxic hypercapnic respiratory failure.  She was recently hospitalized 2/7-2/10/2025 for bacterial PNA with CT findings of subtotal LLL atelectasis.  CXR  showed new pleural effusion with persistent left basilar opacity previously shown to reflect subtotal atelectasis of the lingula and left lower lobe.   Regarding her breast cancer diagnosis, pt was lost to follow-up, neither she nor her son were able to name her oncologist.     With regards to Hospice, [facility], they explained pt is in their Palliative program and they provide her pain management only.  Pt was supposed to meet with [facility] (which provides home based primary care) later this week for admission to their program.     3/11/2025 RECONSULT     During this admission Palliative LCSW worked with pt on her goals; pt had revoked the DDNR and POLST and completed a new AMD indicating FULL CODE status, wanting all life prolonging measures. On 3/4, pt had refused to go to rehab and plan was to transfer home;  was working on getting pt a BIPAP machine, karmen lift and nebulizer machine. ON 3/8 she was transferred back to ICU for acute on chronic hypoxic, hypercapnic respiratory failure and CXR 
Palliative Medicine  Patient Name: Jessica Mane  YOB: 1970  MRN: [id number]  Age: 54 y.o.  Gender: female    Date of Initial Consult: 2/24/2025  Date of Service: 3/13/2025  Time: 12:46 PM  Provider: LUIS ANGEL Loredo NP  Hospital Day: 20  Admit Date: 2/22/2025  Referring Provider: Ignacia Santiago NP       Reasons for Consultation:  help with making end of life decisions    HISTORY OF PRESENT ILLNESS (HPI):   Jessica Mane is a 54 y.o. female with a past medical history ofwidely metastatic breast cancer on anastrazole, COPD w chronic hypoxia on 5L/min NC, HFpEF, TERRA non-compliant with CPAP, chronic pain on methadone, Hx of PE on eliquis, who was admitted on 2/22/2025 from home with a diagnosis of acute on chronic hypoxic hypercapnic respiratory failure.  She was recently hospitalized 2/7-2/10/2025 for bacterial PNA with CT findings of subtotal LLL atelectasis.  CXR  showed new pleural effusion with persistent left basilar opacity previously shown to reflect subtotal atelectasis of the lingula and left lower lobe.   Regarding her breast cancer diagnosis, pt was lost to follow-up, neither she nor her son were able to name her oncologist.     With regards to Hospice, [facility], they explained pt is in their Palliative program and they provide her pain management only.  Pt was supposed to meet with [facility] (which provides home based primary care) later this week for admission to their program.     3/11/2025 RECONSULT     During this admission Palliative LCSW worked with pt on her goals; pt had revoked the DDNR and POLST and completed a new AMD indicating FULL CODE status, wanting all life prolonging measures. On 3/4, pt had refused to go to rehab and plan was to transfer home;  was working on getting pt a BIPAP machine, karmen lift and nebulizer machine. ON 3/8 she was transferred back to ICU for acute on chronic hypoxic, hypercapnic respiratory failure and CXR 
Palliative Medicine  Per Ijeoma Hebert NP: Jessica Mane is a 54 y.o. female with a past medical history ofwidely metastatic breast cancer on anastrazole, COPD w chronic hypoxia on 5L/min NC, HFpEF, TERRA non-compliant with CPAP, chronic pain on methadone, Hx of PE on eliquis, who was admitted on 2/22/2025 from home with a diagnosis of acute on chronic hypoxic hypercapnic respiratory failure.  She was recently hospitalized 2/7-2/10/2025 for bacterial PNA with CT findings of subtotal LLL atelectasis.  CXR today shows new pleural effusion with persistent left basilar opacity previously shown to reflect subtotal atelectasis of the lingula and left lower lobe.              Regarding her breast cancer diagnosis, pt was lost to follow-up, neither she nor her son were able to name her oncologist.                With regards to Hospice, I [facility], they explained pt is in their Palliative program and they provide her pain management only.  Pt was supposed to meet with [facility] (which provides home based primary care) later this week for admission to their program.      Code Status: Full code, revoked DDNR and POLST    Advance Care Planning:  Demographics 8/20/2023   Marital Status Single   AMD on file names her ex-sister-in-law Flora Painter ([place]) as primary ([phone]) and her son Yunior Allen ([phone]) as secondary HCDM.   Patient completed a POST form with Dr. Baez which states \"DDNR\" and further indicates she wants \"Limited Additional Interventions, No feeding tube.\"      Patient / Family Encounter Documentation     Participants (names): Jessica Mane, awake, alert, but very slow mentation, (by phone, son Yunior), Meaghan Lin, ISABELW     Narrative:   --Palliative SW reviewed chart, checked in with nurse, Jaiden, then offered supportive visit to patient, who was received lying on her left side, wearing HFO2, alert and receptive to encounter.   --Ms. Mane said she 
Palliative Medicine  Per Ijeoma Hebert NP: Jessica Mane is a 54 y.o. female with a past medical history ofwidely metastatic breast cancer on anastrazole, COPD w chronic hypoxia on 5L/min NC, HFpEF, TERRA non-compliant with CPAP, chronic pain on methadone, Hx of PE on eliquis, who was admitted on 2/22/2025 from home with a diagnosis of acute on chronic hypoxic hypercapnic respiratory failure.  She was recently hospitalized 2/7-2/10/2025 for bacterial PNA with CT findings of subtotal LLL atelectasis.  CXR today shows new pleural effusion with persistent left basilar opacity previously shown to reflect subtotal atelectasis of the lingula and left lower lobe.              Regarding her breast cancer diagnosis, pt was lost to follow-up, neither she nor her son were able to name her oncologist.                With regards to Hospice, [facility], they explained pt is in their Palliative program and they provide her pain management only.  Pt was supposed to meet with [facility] (which provides home based primary care) later this week for admission to their program.      Code Status: Full code, even though DDNR is on chart.     Advance Care Planning:  Demographics 8/20/2023   Marital Status Single   AMD on file names her ex-sister-in-law Flora Painter ([place]) as primary ([phone]) and her son Yunior Allen ([phone]) as secondary HCDM.   Patient completed a POST form today with Dr. Baez which states \"DDNR\" and further indicates she wants \"Limited Additional Interventions, No feeding tube.\" No other orders.      Patient / Family Encounter Documentation     Participants (names): Jessica Mane, (did not wake up enough to engage in any conversation or eat her lunch), (by phone, Yunior, son), Meaghan Lin LCSW     Narrative:   --Palliative SW reviewed chart, checked in with nurse and accompanied her into patient's room where she was received sleeping, wearing O2 at 6l and could not 
Palliative Medicine  Per Ijeoma Hebert NP: Jessica Mane is a 54 y.o. female with a past medical history ofwidely metastatic breast cancer on anastrazole, COPD w chronic hypoxia on 5L/min NC, HFpEF, TERRA non-compliant with CPAP, chronic pain on methadone, Hx of PE on eliquis, who was admitted on 2/22/2025 from home with a diagnosis of acute on chronic hypoxic hypercapnic respiratory failure.  She was recently hospitalized 2/7-2/10/2025 for bacterial PNA with CT findings of subtotal LLL atelectasis.  CXR today shows new pleural effusion with persistent left basilar opacity previously shown to reflect subtotal atelectasis of the lingula and left lower lobe.              Regarding her breast cancer diagnosis, pt was lost to follow-up, neither she nor her son were able to name her oncologist.                With regards to Hospice, I [facility], they explained pt is in their Palliative program and they provide her pain management only.  Pt was supposed to meet with [facility] (which provides home based primary care) later this week for admission to their program.      Code Status: Full code, revoked DDNR and POLST    Advance Care Planning:  Demographics 8/20/2023   Marital Status Single   AMD on file names her Son Yunior as primary, sister Janae ([place]) as secondary and June ([place])Valentine as supplemental ([phone]). Ms. Mane revoked her previous DDNR and POLST.      Patient / Family Encounter Documentation     Participants (names): Jessica Mane, sister Janae, nephew Mau, brother Ale, Meaghan Lin, [facility]     Narrative:   --Palliative SW checked in with colleague, Ijeoma Ghosh NP, awaiting a family member to begin C  meeting with patient and family.  --Patient was received alert and oriented and interacting with her sister and nephew while they waited for her brother to arrive. Son Yunior is working today and unavailable for the meeting.  --This writer encouraged patient and 
Palliative Medicine SW Note    Palliative SW checked in with assigned nurse, Marcela, who reported patient was alert and interactive today and had visit from her son and brother yesterday evening.  No family present today.    [facility]  [facility] primary HCDM, June, requesting return call to confirm time for meeting this afternoon.    Thank you for including Palliative team in the care of Ms. Jessica Mane.    Meaghan Lin, [facility]  Palliative Medicine [phone]  
Patient external cath die not collect and patient had to be clean. Patient allowed assessment of buttock, and sacrum. Stage two noted on right buttock. It was noted on admission in previous documentation but was deleted in error. Wound documented again on avatar  
Patient follows with PAR. Will see in consultation. Full note to follow.  
Patient increasingly lethargic,  responds to voice MD made aware received orders for stat ABG      
Patient refused respiratory treatments including CPT.  
Patient refused to be repositioned during the night   
Patient refused to be turn and would not allow full assessment. Would not allow assessment of sacrum and buttock  
Patient stated\" I do not have time for this stuff they al ready assessed me I do not want to be bothered\"  
Pharmacy Antimicrobial Kinetic Dosing    Indication for Antimicrobials: Nosocomial Pneumonia     Current Regimen of Each Antimicrobial:  Vancomycin - Pharmacy to Dose; Start Date ; Day # 2  Piperacillin-Tazobactam 4.5 g IV Q8H Start Date ; Day # 2    Previous Antimicrobial Therapy:     Goal Level: Vancomycin -600    Date Dose & Interval Measured (mcg/mL) Predicted AUC 24-48 Predicted AUC 24,ss     1250mg q 8  34.5                     Significant Cultures:    MRSA nares - pending    Labs:  Recent Labs     Units 25  0347 25  0200 25  0011 25  2139   CREATININE MG/DL 0.77 0.47* 0.5* 0.65   BUN MG/DL 21* 18  --  18   PROCAL ng/mL  --  0.30  --   --    WBC K/uL 11.8* 10.3  --  15.2*     Temp (24hrs), Av.5 °F (36.9 °C), Min:97.3 °F (36.3 °C), Max:98.8 °F (37.1 °C)      Conditions for Dosing Consideration: None    Creatinine Clearance (mL/min): Estimated Creatinine Clearance: 123 mL/min (based on SCr of 0.77 mg/dL).       Impression/Plan:   Vancomycin level supratherapeutic, vancomycin placed on hold and will check random level tomorrow am to ensure level is coming down before resuming new regimen   MRSA swab pending   Continue Piperacillin-Tazobactam  Antimicrobial stop date 7 days      Pharmacy will follow daily and adjust medications as appropriate for renal function and/or serum levels.    Thank you,  Monalisa [facility]      
Pharmacy Automatic Renal Dosing Protocol     Labs:  Recent Labs     19  0250 19  1908 19  1747   CREA 0.91  --  1.01   BUN 40*  --  41*   WBC 11.4* 12.9*  --      Temp (24hrs), Av.4 °F (36.9 °C), Min:97.8 °F (36.6 °C), Max:99 °F (37.2 °C)    Creatinine Clearance (mL/min) or Dialysis: >100    Impression/Plan:   Will adjust amoxicllin from 500mg q12h to 500mg q8h per protocol / indication      Pharmacy will follow daily and adjust medications as appropriate for renal function and/or serum levels.    Thank you,  Larissa Case, PHARMD    Renal Dosing  [email or web address]   
Physical Therapy    Patient on BIPAP and lethargic today. RN gave clearance to mobilize as tolerated. Patient semi alert with eyes open and mumbling brief words but difficult to understand. Responded \"yes\" when asked if wants to roll side to side. However then unable to follow commands to squeeze therapist's hand or lift arm when asked. Perseverating on \"lift them up\" but unable to further explain the meaning. RN brought to bedside and noted SpO2 83-84% on BIPAP. Session aborted as patient not able to meaningfully participate in skilled intervention and unable to tolerate from medical standpoint. Current presentation appears to be a decline from PT evaluation on 2/28.     Will continue to follow.    Thank you,  Chico Kong, PT, DPT  Total time: 9 minutes  
Pt does not want to go on bipap at this time, requesting to eat first and then wear bipap. RN aware, will try for pt to wear bipap again later.    0120AM - Pt now agreeable to wearing bipap. Pt placed on bipap w/ previous AVAPS settings, tolerating well, RN aware.  
Report received from Yury. Lab values and refusal to turn discussed at length.  On arrival to room, patient had removed IV accidentally. Care given, oral meds and partial upper body bath with gown change. RN called for difficult restart of IV.  1200  IV team here for insert of PIV. Assessment completed. Again refuses to turn.  Sleeping for brief intervals. Refused bath, but allowed arms and armpits to be cleaned. Will attempt again after lunch.   1400 Patient eating lunch now. Watching TV. Still refuses to turn.  1615 Assessment completed. Patient became upset after removing chapstick, stating her money and credit cards are missing. No money or cards on valuables list. Several envelopes and receipts noted in black and tan [employer] type of bags. See assessment. Blanca care given and allowed this RN to turn patient. Incontinent of large amount of urine despite use of Purewick.  1730  BrothTalia salter, now here, and also upset. Security called to see if there are any valuables under her name, but none are recorded and no receipt is in paper chart. Mau Elizondo found card in bag at bedside. Plans to take this home note. Encouraged patient to secure items in more safe manner. Number for patient advocate given to Talia maldonado.  1830 Again refused to turn despite encouragement. Card taken by Mau Elizondo. Also took gown home for laundering. Now eating small bites of dinner.  1925 Report given to JULY Robison.  
Respiratory note    2200- Patient refused Bipap    2330- Patient refused Bipap    0125-Patient refused Bipap  
Spiritual Health History and Assessment/Progress Note  [facility]    Initial Encounter, Spiritual/Emotional Needs, Palliative Care,  ,  ,      Name: Jessica Mane MRN: [id number]    Age: 54 y.o.     Sex: female   Language: English   Roman Catholic: Taoism   Hypercapnic respiratory failure (HCC)     Date: 2/23/2025            Total Time Calculated: 11 min              Spiritual Assessment began in [facility] 2 CRITICAL CARE        Referral/Consult From: Rounding, Palliative Care   Encounter Overview/Reason: Initial Encounter, Spiritual/Emotional Needs, Palliative Care  Service Provided For: Patient    Garima, Belief, Meaning:   Patient is connected with a garima tradition or spiritual practice Taoism  Family/Friends No family/friends present      Importance and Influence:  Patient has no beliefs influential to healthcare decision-making identified during this visit Patient is unable or unwilling to access spiritual care coping skills or simple activities to engage spiritual self.   Family/Friends No family/friends present    Community:  Patient feels well-supported. Support system includes: Extended family and Other: Patient expressed family and attendant, as possibilities to access.   Family/Friends No family/friends present    Assessment and Plan of Care:     Patient Interventions include: Facilitated expression of thoughts and feelings and Explored spiritual coping/struggle/distress  Family/Friends Interventions include: No family/friends present    Patient Plan of Care: Spiritual Care available upon further referral  Family/Friends Plan of Care: No family/friends present    Electronically signed by Laura Colorado  Intern on 2/23/2025 at 1:15 PM  
Went into patient's room several times to try to place BIPAP on and each time she says \"not yet, I will do it later, she will ring for me to put it on.\"    
  BMI Categories: Obese Class 3 (BMI 40.0 or greater)    Estimated Daily Nutrient Needs:  Energy Requirements Based On: Formula  Weight Used for Energy Requirements: Current  Energy (kcal/day): 1946 kcals (BMR x 1.2AF -500kcals)  Weight Used for Protein Requirements: Ideal  Protein (g/day): 114-142g (2.0-2.5 g/kg IBW)  Method Used for Fluid Requirements: 1 ml/kcal  Fluid (ml/day): 1950 mL    Nutrition Diagnosis:   Increased nutrient needs related to increase demand for energy/nutrients as evidenced by wounds, BMI    Nutrition Interventions:   Food and/or Nutrient Delivery: Continue Current Diet, Continue Oral Nutrition Supplement  Nutrition Education/Counseling: No recommendation at this time  Coordination of Nutrition Care: Continue to monitor while inpatient, Interdisciplinary Rounds       Goals:  Goals: PO intake 75% or greater, by next RD assessment          Nutrition Monitoring and Evaluation:   Behavioral-Environmental Outcomes: None Identified  Food/Nutrient Intake Outcomes: Food and Nutrient Intake, Supplement Intake  Physical Signs/Symptoms Outcomes: Biochemical Data, Weight, Fluid Status or Edema, Nutrition Focused Physical Findings    Discharge Planning:    Continue current diet, Continue Oral Nutrition Supplement     Dinorah Gordon RD  Contact: ext [phone]    
Bed  Patient Moved Closer to Nursing Station: No    Active Consults:   IP CONSULT TO PALLIATIVE CARE  IP CONSULT TO PHARMACY    Length of Stay:  Expected LOS: 7  Actual LOS: 2    Monalisa Nicholson RN                            
CONSULT TO PALLIATIVE CARE  IP CONSULT TO PHARMACY  IP CONSULT TO PULMONOLOGY  IP CONSULT TO CASE MANAGEMENT  IP CONSULT TO CASE MANAGEMENT  IP CONSULT TO CASE MANAGEMENT    Length of Stay:  Expected LOS: 16  Actual LOS: 13    Shayy Yan RN                           
Healing Oral Supplement  ADULT ORAL NUTRITION SUPPLEMENT; Breakfast, Dinner; Low Calorie/High Protein Oral Supplement  Passing flatus: YES    Pain Management:   Patient states pain is manageable on current regimen: YES    Skin:  Gold Scale Score: 14  Interventions: Wound Offloading (Prevention Methods): Turning, Repositioning, Pillows, Bed, pressure reduction mattress    Patient Safety:  Fall Risk: Nursing Judgement-Fall Risk High(Add Comments): Yes  Fall Risk Interventions  Nursing Judgement-Fall Risk High(Add Comments): Yes  Toilet Every 2 Hours-In Advance of Need: Yes  Hourly Visual Checks: Awake, In bed  Fall Visual Posted: Armband, Fall sign posted  Room Door Open: Deferred for droplet isolation  Alarm On: Bed  Patient Moved Closer to Nursing Station: No    Active Consults:   IP CONSULT TO PALLIATIVE CARE  IP CONSULT TO PHARMACY  IP CONSULT TO PULMONOLOGY    Length of Stay:  Expected LOS: 7  Actual LOS: 8    Derek Mckeon RN                          
Intake: %  Average Supplements Intake: None Ordered  ADULT DIET; Regular; 4 carb choices (60 gm/meal)  ADULT ORAL NUTRITION SUPPLEMENT; Breakfast, PM Snack; Low Calorie/High Protein Oral Supplement  ADULT ORAL NUTRITION SUPPLEMENT; Breakfast, Dinner; Wound Healing Oral Supplement    Anthropometric Measures:  Height: 165.1 cm (5' 5\")  Ideal Body Weight (IBW): 125 lbs (57 kg)       Current Body Weight: 142.6 kg (314 lb 6 oz), 251.5 % IBW. Weight Source: Bed scale  Current BMI (kg/m2): 52.3           Weight Adjustment For: No Adjustment                 BMI Categories: Obese Class 3 (BMI 40.0 or greater)    Estimated Daily Nutrient Needs:  Energy Requirements Based On: Kcal/kg  Weight Used for Energy Requirements: Current  Energy (kcal/day): 5614-6353 kcals (11-14 kcals/kg)  Weight Used for Protein Requirements: Ideal  Protein (g/day): 114-143g (2.0-2.5g/kg IBW)  Method Used for Fluid Requirements: 1 ml/kcal  Fluid (ml/day): 1600mL    Nutrition Diagnosis:   Increased nutrient needs related to  (protein) as evidenced by wounds    Nutrition Interventions:   Food and/or Nutrient Delivery: Modify Current Diet, Start Oral Nutrition Supplement  Nutrition Education/Counseling: No recommendation at this time  Coordination of Nutrition Care: Continue to monitor while inpatient, Interdisciplinary Rounds       Goals:  Goals: PO intake 50% or greater, PO intake 75% or greater, by next RD assessment          Nutrition Monitoring and Evaluation:   Behavioral-Environmental Outcomes: None Identified  Food/Nutrient Intake Outcomes: Food and Nutrient Intake, Supplement Intake  Physical Signs/Symptoms Outcomes: Biochemical Data, GI Status, Fluid Status or Edema, Nutrition Focused Physical Findings, Skin, Weight    Discharge Planning:    Continue current diet, Continue Oral Nutrition Supplement     Nithya Long RD  Contact: [id number]    
Judgement-Fall Risk High(Add Comments): Yes  Toilet Every 2 Hours-In Advance of Need: Yes  Hourly Visual Checks: Awake, In bed  Fall Visual Posted: Fall sign posted, Armband, Socks  Room Door Open: Deferred to decrease stimulation  Alarm On: Bed  Patient Moved Closer to Nursing Station: No    Active Consults:   IP CONSULT TO PALLIATIVE CARE  IP CONSULT TO PHARMACY  IP CONSULT TO PULMONOLOGY    Length of Stay:  Expected LOS: 7  Actual LOS: 6    Wisam Garcia RN                            
Need: Yes  Hourly Visual Checks: Awake, In bed, Quiet  Fall Visual Posted: Armband, Socks  Room Door Open: Deferred for droplet isolation  Alarm On: Bed  Patient Moved Closer to Nursing Station: No    Active Consults:   IP CONSULT TO PALLIATIVE CARE  IP CONSULT TO PHARMACY  IP CONSULT TO PULMONOLOGY  IP CONSULT TO CASE MANAGEMENT  IP CONSULT TO CASE MANAGEMENT  IP CONSULT TO CASE MANAGEMENT    Length of Stay:  Expected LOS: 12  Actual LOS: 11    Beatrice Zuleta RN                            
Open: Deferred for droplet isolation  Alarm On: Bed  Patient Moved Closer to Nursing Station: No    Active Consults:   IP CONSULT TO PALLIATIVE CARE  IP CONSULT TO PHARMACY  IP CONSULT TO PULMONOLOGY  IP CONSULT TO CASE MANAGEMENT  IP CONSULT TO CASE MANAGEMENT  IP CONSULT TO CASE MANAGEMENT    Length of Stay:  Expected LOS: 11  Actual LOS: 10    Beatrice Zuleta RN                             
PALLIATIVE CARE  IP CONSULT TO CASE MANAGEMENT  IP CONSULT TO HOSPICE    Length of Stay:  Expected LOS: 24  Actual LOS: 22    Evan Mathews RN                            
PALLIATIVE CARE  IP CONSULT TO PULMONOLOGY  IP CONSULT TO CASE MANAGEMENT  IP CONSULT TO CASE MANAGEMENT  IP CONSULT TO CASE MANAGEMENT  IP CONSULT TO PALLIATIVE CARE  IP CONSULT TO CASE MANAGEMENT  IP CONSULT TO HOSPICE    Length of Stay:  Expected LOS: 26  Actual LOS: 25    Silvana Dave RN                            
Safety:  Fall Risk: Nursing Judgement-Fall Risk High(Add Comments): Yes  Fall Risk Interventions  Nursing Judgement-Fall Risk High(Add Comments): Yes  Toilet Every 2 Hours-In Advance of Need: Yes  Hourly Visual Checks: Awake, In bed, Quiet  Fall Visual Posted: Armband, Socks  Room Door Open: Deferred for droplet isolation  Alarm On: Bed  Patient Moved Closer to Nursing Station: No    Active Consults:   IP CONSULT TO PALLIATIVE CARE  IP CONSULT TO PHARMACY  IP CONSULT TO PULMONOLOGY  IP CONSULT TO CASE MANAGEMENT  IP CONSULT TO CASE MANAGEMENT  IP CONSULT TO CASE MANAGEMENT    Length of Stay:  Expected LOS: 12  Actual LOS: 10    Rosario Chandler RN                            
Stay:  Expected LOS: 15  Actual LOS: 12    Carmelo Mnejivar RN     
Stay:  Expected LOS: 16  Actual LOS: 13    Carmelo Menjivar RN     
TO PULMONOLOGY  IP CONSULT TO CASE MANAGEMENT  IP CONSULT TO CASE MANAGEMENT  IP CONSULT TO CASE MANAGEMENT    Length of Stay:  Expected LOS: 11  Actual LOS: 9    Evan Mathews RN                            
Yes  Toilet Every 2 Hours-In Advance of Need: Yes  Hourly Visual Checks: Awake, In bed  Fall Visual Posted: Socks  Room Door Open: Yes  Alarm On: Bed  Patient Moved Closer to Nursing Station: No    Active Consults:   IP CONSULT TO PALLIATIVE CARE  IP CONSULT TO PULMONOLOGY  IP CONSULT TO CASE MANAGEMENT  IP CONSULT TO CASE MANAGEMENT  IP CONSULT TO CASE MANAGEMENT  IP CONSULT TO PALLIATIVE CARE  IP CONSULT TO CASE MANAGEMENT  IP CONSULT TO HOSPICE    Length of Stay:  Expected LOS: 26  Actual LOS: 24    Chiquita Batista RN                           
decrease stimulation  Alarm On: Bed  Patient Moved Closer to Nursing Station: No    Active Consults:   IP CONSULT TO PALLIATIVE CARE  IP CONSULT TO PHARMACY  IP CONSULT TO PULMONOLOGY  IP CONSULT TO CASE MANAGEMENT  IP CONSULT TO CASE MANAGEMENT  IP CONSULT TO CASE MANAGEMENT    Length of Stay:  Expected LOS: 15  Actual LOS: 11    Charlotte Herring RN                            
full code.       Severe COPD w possible exacerbation  Acute on chronic hypoxic, hypercapnic respiratory failure  RSV positive  Diastolic CHF  - Cannot exclude new underlying pneumonia with new leukocytosis.   - Patient recently discharged with L sided atelectasis/pl effusion which seems to be chronic on review of older images.    - s/p Empiric vanc/zosyn. Will DC vanc and change abx to rocephin, completed 5 days Rx.   - Hold Eliquis for possible thoracentesis/chest tube on 2/25.  - BiPAP PRN. Duonebs  - s/p  Solu-Medrol to prednisone 40 mg daily for total 5 days course.  - Palliative consult, patient rescineded DDNR and now full code.  - Poor candidate for intubation given exceptionally poor prognosis  2/26: Appreciate input from pulmonology.  Ultrasound of chest ordered.  Will repeat chest x-ray in the morning for interval change.  Addendum at 4:38 PM: Patient very lethargic, she is responsive to voice but goes right back to sleep.  Stat ABG was done 7.3 2/80/91/96.  I requested RN and respiratory therapist to change the BiPAP settings so that we can blow out the extra CO2.  2/27: Repeat chest x-ray from this morning shows persistent cardiomegaly and vascular prominence. Increased density left lung base remains.  The patient is AOx4 this morning but she is very hard of hearing.  2/28: It appears that patient had a lethargy yesterday afternoon, ABG showed respiratory acidosis.  I spoke with pulmonology APRN only via, she recommended that patient was using BiPAP for only 6 hours overnight.  Patient will benefit from using BiPAP for couple of more hours at a time as well.  Patient has already completed course of steroids.  Patient does not have hemoptysis anymore, no plan for thoracentesis as there was no fluid on ultrasound.  Will stop Lovenox and resume Eliquis.  Patient has not been moving up and will follow-up with PT/OT evaluation.  3/1: Echocardiogram from 02/28/2025 was technically negative difficulty study due 
invasive therapies due to exceptionally poor prognosis    Toxic metabolic encephalopathy:   - Patient oriented x 3, likely due to CO2 narcosis  - Head CT unrevealing for mets    Hx PE:   -Continue Eliquis.    DM2 c/b hyperglycemia  - SSI      Code status: Full code  SUP: NA  DVT prophylaxis: Aldair Painter,June (Geraldo) (Sister-in-Law) (Other)  [phone] (Mobile)       Care Plan discussed with: Patient/Family and Nurse    Patient has very poor prognosis.  Palliative care team is following.  Family meeting today with palliative care.    I personally spent 40 minutes of critical care time.  This is time spent at this critically ill patient's bedside actively involved in patient care as well as the coordination of care and discussions with the patient's family.  This does not include any procedural time which has been billed separately.      Review of Systems:   Review of Systems   All other systems reviewed and are negative.           Vital Signs:   Patient Vitals for the past 4 hrs:   BP Temp Temp src Pulse Resp SpO2   03/11/25 0926 -- -- -- -- 22 --   03/11/25 0900 (!) 112/99 -- -- (!) 101 21 90 %   03/11/25 0817 -- -- -- 97 20 96 %   03/11/25 0800 101/78 98.2 °F (36.8 °C) Axillary 92 13 (!) 89 %   03/11/25 0712 -- -- -- 94 22 96 %   03/11/25 0701 114/71 -- -- 93 18 94 %        Intake/Output Summary (Last 24 hours) at 3/11/2025 1010  Last data filed at 3/11/2025 0800  Gross per 24 hour   Intake 1109.72 ml   Output 250 ml   Net 859.72 ml        Physical Examination:    Physical Exam  Constitutional:       Appearance: She is ill-appearing.   HENT:      Head: Normocephalic and atraumatic.      Mouth/Throat:      Mouth: Mucous membranes are moist.   Cardiovascular:      Rate and Rhythm: Normal rate and regular rhythm.   Pulmonary:      Effort: Pulmonary effort is normal. No respiratory distress.   Neurological:      Mental Status: She is alert.         Labs and Imaging:   Reviewed.      Medications: 
of Stay:  Expected LOS: 7  Actual LOS: 6    Moe Le, RN                            
therapies due to exceptionally poor prognosis    Toxic metabolic encephalopathy:   - Patient oriented x 3, likely due to CO2 narcosis  - Head CT unrevealing for mets    Hx PE:   -Continue Eliquis.    DM2 c/b hyperglycemia  - SSI      Code status: Full code  SUP: NA  DVT prophylaxis: Aldair Painter,June (Geraldo) (Sister-in-Law) (Other)  [phone] (Mobile)       Care Plan discussed with: Patient/Family and Nurse      I personally spent 40 minutes of critical care time.  This is time spent at this critically ill patient's bedside actively involved in patient care as well as the coordination of care and discussions with the patient's family.  This does not include any procedural time which has been billed separately.      Review of Systems:   Review of Systems   All other systems reviewed and are negative.           Vital Signs:   Patient Vitals for the past 4 hrs:   BP Temp Temp src Pulse Resp SpO2   03/10/25 0820 -- -- -- 98 -- 100 %   03/10/25 0800 (!) 107/96 98.3 °F (36.8 °C) Axillary 93 25 --   03/10/25 0600 97/72 -- -- 86 22 97 %        Intake/Output Summary (Last 24 hours) at 3/10/2025 0903  Last data filed at 3/10/2025 0800  Gross per 24 hour   Intake 696.02 ml   Output 2442 ml   Net -1745.98 ml        Physical Examination:    Physical Exam  Constitutional:       Appearance: She is ill-appearing.   HENT:      Head: Normocephalic and atraumatic.      Mouth/Throat:      Mouth: Mucous membranes are moist.   Cardiovascular:      Rate and Rhythm: Normal rate and regular rhythm.   Pulmonary:      Effort: Pulmonary effort is normal. No respiratory distress.   Neurological:      Mental Status: She is alert.         Labs and Imaging:   Reviewed.      Medications:     Current Facility-Administered Medications   Medication Dose Route Frequency    acetylcysteine (MUCOMYST) 20 % solution 600 mg  600 mg Inhalation Q6H RT    sodium chloride (Inhalant) 3 % nebulizer solution 4 mL  4 mL Nebulization Q6H RT    
to Nursing Station: No    Active Consults:   IP CONSULT TO PALLIATIVE CARE  IP CONSULT TO PULMONOLOGY  IP CONSULT TO CASE MANAGEMENT  IP CONSULT TO CASE MANAGEMENT  IP CONSULT TO CASE MANAGEMENT  IP CONSULT TO PALLIATIVE CARE  IP CONSULT TO CASE MANAGEMENT  IP CONSULT TO HOSPICE    Length of Stay:  Expected LOS: 23  Actual LOS: 22    Beatrice Zuleta RN                            
Imaging:   Reviewed.      Medications:     Current Facility-Administered Medications   Medication Dose Route Frequency    sennosides-docusate sodium (SENOKOT-S) 8.6-50 MG tablet 2 tablet  2 tablet Oral BID    polyethylene glycol (GLYCOLAX) packet 17 g  17 g Oral Daily    insulin lispro (HUMALOG,ADMELOG) injection vial 0-8 Units  0-8 Units SubCUTAneous Q6H    ipratropium 0.5 mg-albuterol 2.5 mg (DUONEB) nebulizer solution 1 Dose  1 Dose Inhalation Q6H RT    anastrozole (ARIMIDEX) tablet 1 mg  1 mg Oral Daily    acetylcysteine (MUCOMYST) 20 % solution 600 mg  600 mg Inhalation Q6H RT    sodium chloride (Inhalant) 3 % nebulizer solution 4 mL  4 mL Nebulization Q6H RT    alcohol 62% (NOZIN) nasal  1 ampule  1 ampule Nasal Q12H    guaiFENesin (MUCINEX) extended release tablet 600 mg  600 mg Oral BID    ipratropium 0.5 mg-albuterol 2.5 mg (DUONEB) nebulizer solution 1 Dose  1 Dose Inhalation Q8H PRN    glucose chewable tablet 16 g  4 tablet Oral PRN    dextrose bolus 10% 125 mL  125 mL IntraVENous PRN    Or    dextrose bolus 10% 250 mL  250 mL IntraVENous PRN    glucagon injection 1 mg  1 mg SubCUTAneous PRN    dextrose 10 % infusion   IntraVENous Continuous PRN    insulin glargine (LANTUS) injection vial 10 Units  10 Units SubCUTAneous Daily    HYDROmorphone (DILAUDID) tablet 2 mg  2 mg Oral Q6H PRN    cetirizine (ZYRTEC) tablet 10 mg  10 mg Oral Nightly    arformoterol 15 mcg-budesonide 0.5 mg neb solution   Nebulization BID RT    ammonium lactate (LAC-HYDRIN) 12 % lotion   Topical Q1H PRN    lidocaine 4 % external patch 1 patch  1 patch TransDERmal Daily    metoprolol tartrate (LOPRESSOR) tablet 25 mg  25 mg Oral BID    sodium chloride flush 0.9 % injection 5-40 mL  5-40 mL IntraVENous 2 times per day    sodium chloride flush 0.9 % injection 5-40 mL  5-40 mL IntraVENous PRN    0.9 % sodium chloride infusion   IntraVENous PRN    ondansetron (ZOFRAN-ODT) disintegrating tablet 4 mg  4 mg Oral Q8H PRN    Or    
Otto Colin Jr., MD Needs Review   glucose monitoring kit 1 kit by Does not apply route daily Bradford Gomez MD Needs Review   blood glucose monitor strips Test 3 times a day & as needed for symptoms of irregular blood glucose. Dispense sufficient amount for indicated testing frequency plus additional to accommodate PRN testing needs. Bradford Gomez MD Needs Review   sennosides-docusate sodium (SENOKOT-S) 8.6-50 MG tablet Take 2 tablets by mouth daily Cece Kline PA Needs Review   apixaban (ELIQUIS) 5 MG TABS tablet Take 1 tablet by mouth 2 times daily Glory Calzada MD Needs Review   acetaminophen (TYLENOL) 500 MG tablet Take 2 tablets by mouth in the morning and at bedtime 0300 and 1500 Deven Escobar MD Needs Review   anastrozole (ARIMIDEX) 1 MG tablet Take 1 tablet by mouth daily Cece Kline PA Needs Review   albuterol (PROVENTIL) (2.5 MG/3ML) 0.083% nebulizer solution Take 3 mLs by nebulization every 6 hours as needed for Wheezing Karla Daugherty MD Needs Review   aspirin 81 MG chewable tablet Take by mouth daily Automatic Reconciliation, Ar Needs Review   fluticasone (FLONASE) 50 MCG/ACT nasal spray 2 sprays by Nasal route daily Automatic Reconciliation, Ar Needs Review   fluticasone-umeclidin-vilant (TRELEGY ELLIPTA) 100-62.5-25 MCG/ACT AEPB inhaler Inhale 1 puff into the lungs daily Automatic Reconciliation, Ar Needs Review   insulin aspart (NOVOLOG FLEXPEN) 100 UNIT/ML injection pen Inject 0-6 Units into the skin 3 times daily (before meals) Sliding scale Automatic Reconciliation, Ar Needs Review   insulin glargine (LANTUS) 100 UNIT/ML injection vial Inject 40 Units into the skin daily Automatic Reconciliation, Ar Needs Review   ipratropium-albuterol (DUONEB) 0.5-2.5 (3) MG/3ML SOLN nebulizer solution Inhale into the lungs 4 times daily Automatic Reconciliation, Ar Needs Review   lidocaine 4 % external patch Place 1 patch onto the skin every 24 hours 
living? Needs further assessment.     Goals of Care / Plan: (Please see Ijeoma Hebert NP note.)  DNR (DDNR is needed for discharge)  [facility] is [facility] once measured for bipap and all dme delivered.  CM assistance with complicated discharge plans is appreciated.  mPOA to contact Medicaid personal care provider to schedule personal care to resume when patient comes home.  Patient and family directed to hospice agency and CM for coordination of discharge plans.  Palliative team continues to be available for education, support, ACP discussions as appropriate.    Thank you for including Palliative Medicine in the care of Ms. Jessica Mane.     Meaghan Lin LCSW  [phone]   
oriented x 3, likely due to CO2 narcosis  - Head CT unrevealing for mets    Hypophosphatemia  2/27: Phosphorus of 2.1 and will give 8 packets of K-Phos.  2/28: Phosphorus of 3.2.    Pulmonary embolism on Eliquis  -Holding Eliquis for now.  Monitor left-sided pleural effusion, if got worse then consider thoracentesis.  At this point it is stable and seems to be chronic.  Will start Lovenox for now.    Diabetes mellitus  On fingerstick glucose and sliding scale insulin.    Thrombocytopenia  2/26: Platelet of 118 and will monitor CBC.      Medical Decision Making:   I personally reviewed labs: CBC, BMP, magnesium, phosphorus  I personally reviewed imaging:   I personally reviewed EKG:  Toxic drug monitoring: H&H and platelets while patient is on Eliquis  Discussed case with: Patient, RN, pulmonology LUIS ANGEL Nichols        Code Status: Full code  DVT Prophylaxis: Eliquis  GI Prophylaxis:    Subjective:     Chief Complaint / Reason for Physician Visit  \" Follow-up for acute on chronic hypoxic hypercapnic respiratory failure, severe COPD exacerbation, CHF, metabolic encephalopathy, PE on Eliquis, diabetes mellitus.\".  Discussed with RN events overnight.       Objective:     VITALS:   Last 24hrs VS reviewed since prior progress note. Most recent are:  Patient Vitals for the past 24 hrs:   BP Temp Temp src Pulse Resp SpO2   02/28/25 0334 137/87 (!) 96.6 °F (35.9 °C) Axillary 80 24 94 %   02/28/25 0030 -- -- -- 75 17 96 %   02/27/25 2250 101/68 97.9 °F (36.6 °C) Axillary 85 17 93 %   02/27/25 2242 -- -- -- 86 23 91 %   02/27/25 2104 -- -- -- -- 16 --   02/27/25 2029 -- -- -- 75 16 97 %   02/27/25 2024 -- -- -- 82 17 100 %   02/27/25 2019 -- -- -- 81 15 98 %   02/27/25 2014 -- -- -- 79 14 94 %   02/27/25 2007 119/71 98.3 °F (36.8 °C) Oral 82 14 93 %   02/27/25 1955 -- -- -- 84 -- --   02/27/25 1638 94/65 97.3 °F (36.3 °C) Oral 83 15 (!) 89 %   02/27/25 1352 -- -- -- 78 17 91 %   02/27/25 1109 137/84 98.8 °F (37.1 °C) Oral 89 16 
taking on this role.  Does the caregiver feel confident administering medication? Not discussed.  Does the caregiver need any help connecting with community resources? Yes.  Does the caregiver feel confident assisting with activities of daily living? Needs further assessment.     Goals of Care / Plan:   Full code (has POST/DDNR)?  [facility] meeting with adilene Mojica and primary HCDM June tomorrow 2/28 in the afternoon    Thank you for including Palliative Medicine in the care of Ms. Jessica Mane.     Meaghan Lin, [facility]  [phone]   
to patient's body habitus with poor visualization of endocardium, LV and RV could not be well-visualized.  --Patient is medically stable at this point.  Patient pending BiPAP machine and Yves lift.  I told her that PT OT was recommending rehab for her.  However she wants to go home.    -Patient does not have help/caregiver at home today, will hold discharge until tomorrow for safe discharge  -C/W BiPAP    Ear infection   - Complaining of pain, and history of ear infections in the past  -Could not find autoscope for examination in the evening, will treat for presumed infection  - C/W amoxicillin for infection, earwax removal.    Toxic metabolic encephalopathy, resolved  CO2 narcosis  - Patient oriented x 3, likely due to CO2 narcosis  - Head CT unrevealing for mets     Hypophosphatemia, resolved   Phosphorus of 3.2. monitor electrlytes and replace prn     Pulmonary embolism on Eliquis  -c/w  Eliquis for now.        Diabetes mellitus  On fingerstick glucose and sliding scale insulin.     Thrombocytopenia  2/26: Platelet of 118 and will monitor CBC.  3/1: Platelet 127.       Medical Decision Making:   I personally reviewed labs: BMP, CBC     Discussed case with: Patient and staff        Code Status: Full  DVT Prophylaxis:   GI Prophylaxis: PPI    Subjective:     Seen and evaluated the patient at bedside        Objective:     VITALS:   Last 24hrs VS reviewed since prior progress note. Most recent are:  Patient Vitals for the past 24 hrs:   BP Temp Temp src Pulse Resp SpO2   03/05/25 1928 130/74 -- Oral 95 16 98 %   03/05/25 1722 -- -- -- -- -- (!) 89 %   03/05/25 1516 (!) 116/56 98.6 °F (37 °C) Oral 96 21 91 %   03/05/25 1513 -- -- -- 96 18 93 %   03/05/25 1200 -- -- -- -- -- 93 %   03/05/25 1132 104/84 98.7 °F (37.1 °C) Axillary 97 29 91 %   03/05/25 1130 -- -- -- 96 23 90 %   03/05/25 1110 107/69 98.8 °F (37.1 °C) -- 98 22 (!) 87 %   03/05/25 1058 107/69 -- -- 83 -- --   03/05/25 0842 113/68 98 °F (36.7 °C) Axillary 
  03/03/25 0439 -- -- -- 93 23 98 %   03/03/25 0319 (!) 93/47 -- -- 88 15 --   03/03/25 0314 (!) 92/51 98.1 °F (36.7 °C) Axillary 89 12 96 %   03/02/25 2319 113/67 98.5 °F (36.9 °C) Axillary 82 12 95 %   03/02/25 2036 -- -- -- 85 16 95 %   03/02/25 2010 -- -- -- 94 -- 95 %   03/02/25 1930 138/79 98.2 °F (36.8 °C) Oral 91 16 93 %   03/02/25 1545 (!) 141/79 99 °F (37.2 °C) Oral 89 14 (!) 86 %         Intake/Output Summary (Last 24 hours) at 3/3/2025 1424  Last data filed at 3/3/2025 0319  Gross per 24 hour   Intake 600 ml   Output 650 ml   Net -50 ml        I had a face to face encounter and independently examined this patient on 3/3/2025, as outlined below:  PHYSICAL EXAM:  General: Alert, cooperative  EENT:  EOMI. Anicteric sclerae.  Resp:  CTA bilaterally, no wheezing or rales.  No accessory muscle use  CV:  Regular  rhythm,  No edema  GI:  Soft, Non distended, Non tender.  +Bowel sounds  Neurologic:  Alert and oriented X 3, normal speech,   Psych:   Good insight. Not anxious nor agitated  Skin:  No rashes.  No jaundice    Reviewed most current lab test results and cultures  YES  Reviewed most current radiology test results   YES  Review and summation of old records today    NO  Reviewed patient's current orders and MAR    YES  PMH/SH reviewed - no change compared to H&P    Procedures: see electronic medical records for all procedures/Xrays and details which were not copied into this note but were reviewed prior to creation of Plan.      LABS:  I reviewed today's most current labs and imaging studies.  Pertinent labs include:  Recent Labs     03/01/25 0053 03/02/25 0449   WBC 7.1 9.7   HGB 11.3* 11.4*   HCT 39.4 39.6   * 136*     Recent Labs     03/01/25 0053 03/02/25 0449    138   K 3.7 3.8   CL 98 97   CO2 43* 42*   GLUCOSE 162* 140*   BUN 24* 17   CREATININE 0.65 0.55   CALCIUM 9.9 9.8   MG 1.8  --    PHOS 3.8  --        Signed: Bernard Kay MD          
-- -- 90 12 96 %   03/05/25 2302 112/63 97.9 °F (36.6 °C) Oral 88 13 96 %       No intake or output data in the 24 hours ending 03/06/25 2226       I had a face to face encounter and independently examined this patient on 3/6/2025, as outlined below:  PHYSICAL EXAM:  General: Alert, cooperative  EENT:  EOMI. Anicteric sclerae.  Resp:  CTA bilaterally, no wheezing or rales.  No accessory muscle use  CV:  Regular  rhythm,  No edema  GI:  Soft, Non distended, Non tender.  +Bowel sounds  Neurologic:  Alert and oriented X 3, normal speech,   Psych:   Good insight. Not anxious nor agitated  Skin:  No rashes.  No jaundice    Reviewed most current lab test results and cultures  YES  Reviewed most current radiology test results   YES  Review and summation of old records today    NO  Reviewed patient's current orders and MAR    YES  PMH/SH reviewed - no change compared to H&P    Procedures: see electronic medical records for all procedures/Xrays and details which were not copied into this note but were reviewed prior to creation of Plan.      LABS:  I reviewed today's most current labs and imaging studies.  Pertinent labs include:  Recent Labs     03/04/25  0520 03/05/25  0604   WBC 11.2* 10.0   HGB 11.8 11.0*   HCT 43.0 39.0    164     Recent Labs     03/04/25  0520 03/04/25  1830 03/05/25  0253    138 135*   K 5.4* 4.3 4.8   CL 99 99 98   CO2 38* 41* 38*   GLUCOSE 168* 177* 172*   BUN 12 12 12   CREATININE 0.63 0.64 0.58   CALCIUM 10.2* 10.6* 10.4*   MG 1.7  --   --    PHOS 2.8  --   --        Signed: Bernard Kay MD          
tablet 40 mg  40 mg Oral QAM AC    insulin lispro (HUMALOG,ADMELOG) injection vial 0-4 Units  0-4 Units SubCUTAneous 4x Daily AC & HS    balsum peru-castor oil (VENELEX) ointment   Topical BID    methadone (DOLOPHINE) tablet 5 mg  5 mg Oral Q12H     ______________________________________________________________________  EXPECTED LENGTH OF STAY: 16  ACTUAL LENGTH OF STAY:          14                 Nate Washington MD   Pulmonary/[facility]  [phone]  3/9/2025     
°F (36.8 °C) Oral 94 17 96 % --   03/01/25 1315 -- -- -- -- -- 95 % --   03/01/25 1118 91/74 98.1 °F (36.7 °C) Oral -- -- -- --         Intake/Output Summary (Last 24 hours) at 3/2/2025 0855  Last data filed at 3/2/2025 0130  Gross per 24 hour   Intake 240 ml   Output 850 ml   Net -610 ml        I had a face to face encounter and independently examined this patient on 3/2/2025, as outlined below:  PHYSICAL EXAM:  General: Alert, cooperative  EENT:  EOMI. Anicteric sclerae.  Resp:  CTA bilaterally, no wheezing or rales.  No accessory muscle use  CV:  Regular  rhythm,  No edema  GI:  Soft, Non distended, Non tender.  +Bowel sounds  Neurologic:  Alert and oriented X 3, normal speech,   Psych:   Good insight. Not anxious nor agitated  Skin:  No rashes.  No jaundice    Reviewed most current lab test results and cultures  YES  Reviewed most current radiology test results   YES  Review and summation of old records today    NO  Reviewed patient's current orders and MAR    YES  PMH/SH reviewed - no change compared to H&P    Procedures: see electronic medical records for all procedures/Xrays and details which were not copied into this note but were reviewed prior to creation of Plan.      LABS:  I reviewed today's most current labs and imaging studies.  Pertinent labs include:  Recent Labs     02/28/25 0337 03/01/25 0053 03/02/25  0449   WBC 8.1 7.1 9.7   HGB 12.0 11.3* 11.4*   HCT 41.2 39.4 39.6   * 127* 136*     Recent Labs     02/28/25 0337 03/01/25  0053 03/02/25  0449    141 138   K 4.5 3.7 3.8   CL 98 98 97   CO2 41* 43* 42*   GLUCOSE 130* 162* 140*   BUN 28* 24* 17   CREATININE 0.61 0.65 0.55   CALCIUM 10.6* 9.9 9.8   MG 2.0 1.8  --    PHOS 3.2 3.8  --        Signed: Bernard Kay MD          
    Code Status:F  DVT Prophylaxis:Eliquis  T/L/D: N  Lines:N  Drains: N  SUP: N  Diet: N  Activity Level:As destinee  ABCDEF Bundle/Checklist Completed:Yes  Disposition: ICU  Multidisciplinary Rounds Completed: Pending  Goals of Care Discussion/Palliative: Consulted  Patient/Family Updated: Y        Peripheral Intravenous Line:    Peripheral IV 25 Left;Anterior Forearm (Active)   Site Assessment Clean, dry & intact 25   Line Status Normal saline locked 25   Phlebitis Assessment No symptoms 25   Infiltration Assessment 0 25   Alcohol Cap Used Yes 25   Dressing Status Clean, dry & intact 25   Dressing Type Transparent 25       Peripheral IV 25 Left Forearm (Active)   Site Assessment Clean, dry & intact 25   Line Status Blood return noted;Normal saline locked 25   Line Care Connections checked and tightened 25   Phlebitis Assessment No symptoms 25   Infiltration Assessment 0 25   Dressing Status Clean, dry & intact 25   Dressing Type Transparent 25   Dressing Intervention New 25        HOSPITAL COURSE/DAILY EVENT LOG     As above    SUBJECTIVE     As HPI     OBJECTIVE       Labs and Data: Reviewed 25  Medications: Reviewed 25  Imaging: Reviewed 25    BP (!) 132/110   Pulse (!) 103   Temp 98.2 °F (36.8 °C) (Oral)   Resp 27   Ht 1.651 m (5' 5\")   Wt (!) 142.6 kg (314 lb 6 oz)   SpO2 98%   BMI 52.31 kg/m²      Temp (24hrs), Av.1 °F (36.7 °C), Min:97.1 °F (36.2 °C), Max:98.8 °F (37.1 °C)    Intake/Output:     Intake/Output Summary (Last 24 hours) at 2025 1005  Last data filed at 2025 0900  Gross per 24 hour   Intake 981.25 ml   Output 1200 ml   Net -218.75 ml       Imaging  CXR  New left pleural effusion with persistent left basilar opacity  previously shown to reflect subtotal atelectasis of the lingula and left 
  WBC 7.2 8.1 7.1   HGB 11.9 12.0 11.3*   HCT 42.0 41.2 39.4   * 128* 127*     Recent Labs     02/27/25  0440 02/28/25  0337 03/01/25  0053    139 141   K 4.3 4.5 3.7    98 98   CO2 42* 41* 43*   GLUCOSE 92 130* 162*   BUN 22* 28* 24*   CREATININE 0.48* 0.61 0.65   CALCIUM 10.8* 10.6* 9.9   MG 2.0 2.0 1.8   PHOS 2.1* 3.2 3.8       Signed: Soraida John MD          
Intravenous Line:    Peripheral IV 25 Left;Anterior Forearm (Active)   Site Assessment Clean, dry & intact 25   Line Status Normal saline locked 25   Phlebitis Assessment No symptoms 25   Infiltration Assessment 0 25   Alcohol Cap Used Yes 25   Dressing Status Clean, dry & intact 25   Dressing Type Transparent 25       Peripheral IV 25 Left Forearm (Active)   Site Assessment Clean, dry & intact 25   Line Status Blood return noted;Normal saline locked 25   Line Care Connections checked and tightened 25   Phlebitis Assessment No symptoms 25   Infiltration Assessment 0 25   Dressing Status Clean, dry & intact 25   Dressing Type Transparent 25   Dressing Intervention New 25        HOSPITAL COURSE/DAILY EVENT LOG     As above    SUBJECTIVE     As HPI     OBJECTIVE       Labs and Data: Reviewed 25  Medications: Reviewed 25  Imaging: Reviewed 25    /77   Pulse (!) 102   Temp 98.8 °F (37.1 °C) (Oral)   Resp 15   Ht 1.651 m (5' 5\")   Wt (!) 142.6 kg (314 lb 6 oz)   SpO2 93%   BMI 52.31 kg/m²      Temp (24hrs), Av.5 °F (36.9 °C), Min:97.3 °F (36.3 °C), Max:98.8 °F (37.1 °C)    Intake/Output:     Intake/Output Summary (Last 24 hours) at 2025 1400  Last data filed at 2025 1100  Gross per 24 hour   Intake 623.18 ml   Output 950 ml   Net -326.82 ml       Imaging  CXR  New left pleural effusion with persistent left basilar opacity  previously shown to reflect subtotal atelectasis of the lingula and left lower  lobe.    Critical care time - 40 minutes.     Good Wayne MD   Critical Care Medicine  [facility]           
Labs     03/02/25  0449 03/04/25  0520   WBC 9.7 11.2*   HGB 11.4* 11.8   HCT 39.6 43.0   * 157     Recent Labs     03/02/25  0449 03/04/25  0520    136   K 3.8 5.4*   CL 97 99   CO2 42* 38*   GLUCOSE 140* 168*   BUN 17 12   CREATININE 0.55 0.63   CALCIUM 9.8 10.2*   MG  --  1.7   PHOS  --  2.8       Signed: Olivia Khanna MD  
concerns:  [] Yes /  [x] No   If \"Yes\" to discuss with pastoral care during IDT     Does caregiver feel burdened by caring for their loved one:   [] Yes /  [x] No /  [] No Caregiver Present/Available [] No Caregiver [] Pt Lives at Facility  If \"Yes\" to discuss with social work during IDT    Anticipatory grief assessment:   [x] Normal  / [] Maladaptive     If \"Maladaptive\" to discuss with social work during IDT    ESAS Anxiety: Anxiety Score: Not anxious    ESAS Depression: Depression Score: Not depressed        LAB AND IMAGING FINDINGS:   Objective data reviewed:  labs, images, records, medication use, vitals, and chart     FINAL COMMENTS   Thank you for allowing Palliative Medicine to participate in the care of Jessica JASPER Mane.    Only check if applicable and billing time based rather than MDM  [] The total encounter time on this service date was ____ minutes which was spent performing a face-to-face encounter and personally completing the provider-level activities documented in the note. This includes time spent prior to the visit and after the visit in direct care of the patient. This time does not include time spent in any separately reportable services.    Electronically signed by   LUIS ANGEL Loredo NP  Palliative Care Team  on 3/12/2025 at 3:46 PM      
HYDROmorphone, ammonium lactate, sodium chloride flush, sodium chloride, ondansetron **OR** ondansetron, polyethylene glycol, acetaminophen **OR** acetaminophen       Relevant other information:   Results       ** No results found for the last 336 hours. **            Recent Labs     03/15/25  0505   WBC 7.6   HGB 10.5*   HCT 36.7        Recent Labs     03/15/25  0505 03/16/25  0304    136   K 4.0 4.1   CL 96* 97   CO2 38* 38*   BUN 14 12   MG 2.0  --    PHOS 2.5*  --       Lab Results   Component Value Date/Time    TSH 0.54 01/05/2025 08:46 PM         Other medical conditions are listed in the active hospital problem list section; these and other pertinent data were taken into consideration when the treatment plan was developed and customized to this patient's unique overall circumstances and needs.  We have reviewed relevant medical records within the constraints of this admission process.   High complexity decision making was performed for this patient who is at high risk for decompensation with multiple organ involvement.   Today total floor/unit time was 45 minutes while caring for this patient and greater than 50% of that time was spent with patient (and/or family/responsible party) coordinating patient's clinical issues; this includes time spent during multidisciplinary rounds.        Luh Schneider MD MPH FACP CHCQM Phy-Adv  3/17/2025

## 2025-05-27 NOTE — ED NOTES
[facility]  INTERNAL MEDICINE PROGRESS NOTE   Patient: Aimee V Damico  Today's Date: 5/27/2025    YOB: 1955  Admission Date: 5/21/2025    MRN: [id number]  Inpatient LOS: 6    Room: [facility]  Hospital Day: Hospital Day: 7    Subjective   HISTORY AND SUBJECTIVE COMPLAINTS     Chief Complaint:   Right stump pain, necrosis s/p I&D    Interval History / Subjective:      Sitting up in the bed  Pain very well controlled  Up to the bathroom with assist for first time in few days  No BM x4 days but has taken softeners  Concerned about being billed for outpatient wound VAC that she no longer needs  No other concerns     Hospital Course:  Aimee V Damico is a 70 year old female who presented on 5/21/2025 with complaints of No chief complaint on file.  follows with Imani Bertrand MD in the community.  Past medical history significant for hypertension, hyperlipidemia, tobacco use disorder, peripheral vascular disease multiple stents right lower extremity and noncompliant aspirin Plavix with recent hospitalization with ischemic right foot. Underwent angiogram without intervention and no revascularization options. She underwent right BKA 4/18/25 and tolerated well. Recommended for post acute rehabilitation program but opted for home discharge. She was noted for possible early ischemia vs ecchymosis to incision line and was monitored closely outpatient in vascular clinic and subsequently wound care for HBO needs for ischemic changes; received 2 HBO treatments thus far. Patient with worsening pain and ischemic changes. Also had some falls at home with one landing on her stump. Patient was seen in vascular surgery clinic and brought to the hospital 5/21/25 for urgent surgical debridement needs and VAC placed. She was admitted postoperatively.     Patient monitored closely and continued on VAC/HBO therapy. Underwent I&D 5/21/25. Was having significant pain postoperative and also had fall landing on her  TRANSFER - OUT REPORT:    Verbal report given to Aissatou (name) on [facility]  being transferred to [facility] (unit) for routine progression of care       Report consisted of patients Situation, Background, Assessment and   Recommendations(SBAR). Information from the following report(s) SBAR, ED Summary and Recent Results was reviewed with the receiving nurse. Lines:   Peripheral IV 08/17/20 Left Antecubital (Active)   Site Assessment Clean, dry, & intact 08/17/20 0100   Phlebitis Assessment 0 08/17/20 0100   Infiltration Assessment 0 08/17/20 0100   Dressing Status Clean, dry, & intact 08/17/20 0100   Dressing Type Transparent 08/17/20 0100   Hub Color/Line Status Flushed 08/17/20 0100        Opportunity for questions and clarification was provided.       Patient transported with:   Monitor  Registered Nurse stump again. Wound dehisced. Her options discussed with the first being salvage attempt of right BKA and the other option above the knee amputation. Patient opted for the latter and underwent right AKA 5/25/25.     ROS:  Pertinent systems negative except as above.    Medications/Infusions:   Current Facility-Administered Medications   Medication Dose Route Frequency Provider Last Rate Last Admin    acetaminophen (TYLENOL) tablet 1,000 mg  1,000 mg Oral 3 times per day Gurdeep Simpson MD   1,000 mg at 05/27/25 0354    sodium chloride 0.9 % injection 2 mL  2 mL Intracatheter 2 times per day Gurdeep Simpson MD   2 mL at 05/25/25 1058    enoxaparin (LOVENOX) injection 40 mg  40 mg Subcutaneous Daily Gurdeep Simpson MD   40 mg at 05/27/25 0821    doxycycline hyclate (VIBRAMYCIN) capsule 100 mg  100 mg Oral 2 times per day Samanta Lainez APNP   100 mg at 05/27/25 0820    sodium hypochlorite (DAKIN'S) 0.062 % (1/8 strength) irrigation solution 1 application.  1 application. Topical 2 times per day Susan Bolton APNP   1 application. at 05/23/25 2025    aspirin (ECOTRIN) enteric coated tablet 81 mg  81 mg Oral Daily John Paul Wharton MD   81 mg at 05/27/25 0820    carvedilol (COREG) tablet 25 mg  25 mg Oral QAM John Paul Wharton MD   25 mg at 05/27/25 0820    gabapentin (NEURONTIN) capsule 600 mg  600 mg Oral TID John Paul Wharton MD   600 mg at 05/27/25 0820    losartan (COZAAR) tablet 100 mg  100 mg Oral Daily John Paul Wharton MD   100 mg at 05/27/25 0820    rosuvastatin (CRESTOR) tablet 20 mg  20 mg Oral Daily John Paul Wharton MD   20 mg at 05/27/25 0820    traZODone (DESYREL) tablet 50 mg  50 mg Oral Nightly John Paul Wharton MD   50 mg at 05/26/25 2001         Objective   PHYSICAL EXAMINATION     Vital 24 Hour Range Most Recent Value   Temperature Temp  Min: 97.9 °F (36.6 °C)  Max: 99 °F (37.2 °C) 98.1 °F (36.7 °C)   Pulse Pulse  Min: 63  Max: 74 65   Respiratory Resp  Min: 13  Max: 20  13   Blood Pressure BP  Min: 102/67  Max: 150/82 (!) 150/82   Pulse Oximetry SpO2  Min: 94 %  Max: 97 % 96 %   Arterial BP No data recorded     O2 No data recorded       Recorded Intake and Output:  Intake/Output Summary (Last 24 hours) at 5/27/2025 0923  Last data filed at 5/26/2025 2200  Gross per 24 hour   Intake 650 ml   Output --   Net 650 ml      Recorded Last Stool Occurrence:       Vital Most Recent Value First Value   Weight 66.2 kg (146 lb) Weight: 66.2 kg (146 lb)   Height 5' 7\" (170.2 cm) Height: 5' 7\" (170.2 cm)   BMI 22.87 N/A     Middle-aged female obese, no obvious distress  HEENT PERRL EOMI  Oral mucosa pale moist no thrush  Neck no JVD  Chest bilateral equal expansion  Lungs decreased breath sounds at bases  CVS S1-S2 regular  Abdomen soft nontender bowel sounds are present  Ext trace edema no calf tenderness. right AKA   CNS alert with nonfocal    TEST RESULTS     Labs: The Laboratory values listed below have been reviewed and pertinent findings discussed in the Assessment and Plan.    Recent Labs   Lab 05/26/25  0959 05/26/25  0458 05/22/25  0725 05/22/25  0724   WBC 11.8*  --  8.2  --    HCT 29.4*  --  35.4*  --    HGB 9.4*  --  11.2*  --      --  287  --    SODIUM  --  140  --  138   POTASSIUM  --  4.4  --  4.6   CHLORIDE  --  105  --  110   CO2  --  29  --  26   CALCIUM  --  9.3  --  9.7   GLUCOSE  --  111*  --  96   BUN  --  12  --  13   CREATININE  --  0.63  --  0.66   AST  --  29  --   --    GPT  --  19  --   --    ALKPT  --  99  --   --    BILIRUBIN  --  0.5  --   --    ALBUMIN  --  3.1*  --   --      Laboratory values: No results found  No results available in last 24 hours    @[email or web address]@  No results found    Lab Results   Component Value Date    VITD25 12.0 (L) 03/21/2018    TSH 0.932 12/03/2024    HGBA1C 6.2 (H) 05/06/2025        Lab Results   Component Value Date    CHOLESTEROL 150 05/06/2025    HDL 48 (L) 05/06/2025    CALCLDL 62 05/06/2025        Lab Results   Component Value  Date    USPG 1.013 11/10/2023    UPROT Negative 11/10/2023    UWBC Trace (A) 11/10/2023    URBC Negative 11/10/2023    UNITR Negative 11/10/2023    UPH 6.5 11/10/2023    UBACTRA None Seen 11/10/2023       No results found      Radiology: Imaging studies have been reviewed and pertinent findings discussed in the Assessment and Plan.  No results found for any visits on 05/21/25 (from the past 48 hours).     ANCILLARY ORDERS     Diet:  Daily W Lunch; Ensure Plus Hp/Standard Oral Supplement Oral Nutrition Supplement  2 Times/Day W Breakfast & Dinner; Jorje/Tissue Building, Orange (+ Diet Starry) Oral Nutrition Supplement  Diet Snacks Daily - Pm Snack Peanut Butter and Crackers With String Cheese  Diet Snacks Daily - Pm Snack Peanut Butter and Crackers With String Cheese  Diet Snacks Daily - Pm Snack Peanut Butter and Crackers With String Cheese  Diet Snacks Daily - Pm Snack Peanut Butter and Crackers With String Cheese  Diet Snacks Daily - Pm Snack Peanut Butter and Crackers With String Cheese  Regular; Yes, Medical Nutrition Management by Rd (Registered Dietitian) Diet  Diet Snacks Daily - Pm Snack Peanut Butter and Crackers With String Cheese  Diet Snacks Daily - Pm Snack Peanut Butter and Crackers With String Cheese  Telemetry: Off  Consults:    IP CONSULT TO INTERNAL MEDICINE  IP CONSULT TO WOUND CARE MEDICAL PROVIDER  IP CONSULT TO NUTRITION SERVICES  Therapy Orders:   PT and OT Orders Placed this Encounter   Procedures    Occupational Therapy Evaluation    Occupational Therapy Treatment    Physical Therapy Evaluation    Physical Therapy Treatment    Physical Therapy Evaluation    Physical Therapy Treatment       ADVANCED DIRECTIVES     Code Status: Full Resuscitation       ASSESSMENT AND PLAN     Smoking status: current smoker    Nutrition status: appropriate  Body mass index is 22.87 kg/m². - appropriate weight BMI 18.5-24  DVT Prophylaxis: none               Assessment:    Necrotic right below-knee stump s/p I&D  5/21/25   nonviable stump status post right AKA 5/25/25   Acute postoperative pain  Peripheral arterial disease s/p right BKA 4/18/25  Uncontrolled pain   Hypertension  Hyperlipidemia  Tobacco use disorder    Plan:   Pain control with PCA for now   Aspirin / statin   Wound care  BP control and monitoring   Monitor bowel and bladder function   PT OT efforts  DVT prophy per vascular   Code status: FULL      DISCHARGE PLANNING     The patient's treatment plans were discussed with patient and RN.     Recommendations for Discharge   SW Home care, Home therapy   PT     OT     SLP        Anticipated discharge destination: Home  Expected Discharge Date: 5/30/25  Barriers to Discharge: Patient is not medically ready and needs to remain in the hospital today due to ongoing postoperative pain control and further monitoring      Samanta Gracia NP  5/27/2025  9:23 AM  I saw and evaluated the patient personally. I performed the history, exam and medical decision making & reviewed all radiology/cardiac & lab data for this encounter and agree with the above note & updated

## 2025-06-02 NOTE — PROGRESS NOTES
Per injorge message procedure cancelled due to lack of health insurance coverage. The patient will call us back to schedule.    Physical Therapy PT eval order received. Pt seen for eval. Appears to be at her baseline mobility status. Required only S due to respiratory monitoring. Pt refused to RN to have her O2 above 4.5L. Pt amb ~12' with S only and sats dropped to 83% and then with somewhat unreliable reading into 70s. Encouraged pt to allow O2 to be increased to her 6L baseline but she would only agree to 5L and then demanded it be turned back to 4.5L because she stated the higher O2 made her chest burn. Left pt at EOB seated. Pt states she wished to remain there currently and would put herself back into the bed. Nurse present post amb and aware of pt at EOB. Full eval report to follow.  
 
Cecelia Villagran, PT

## 2025-07-01 NOTE — ROUTINE PROCESS
Attempted to schedule PCP LETICIA apt with Dr. Meryle Locks, informed by nurse that they would have to contact patient directly to schedule home visit
  Women and children homeless shelter  [phone]   [facility]  Homeless shelter for Men  [phone] Voice    [facility] Community [facility] Commission ([facility]):  What they offer: referral to community transportation and other resources.  Phone Number: [phone]  [facility] [place] ([facility]):  What they offer: Shelter and advocacy services for victims of domestic violence.  Phone Number: [phone] (24/7 line, select option #2).  [facility] ([facility]):  What they offer: Advocacy services for renters and homeowners.  Phone Number: [phone]  [facility]:   What they Offer: Eviction prevention resources and intake for all area emergency shelters.  Phone Number: 211 or 1-174.745.3007    [facility] Agency on Aging, District 5:    [facility], [place], [place], [place], [place], [place], [place], [facility]  What they offer: [place] [facility]  search on website and other resources for seniors.  Phone Number: 821.190.7578     [facility]:  What they offer: Ex-Offender [place] [employer]  Phone Number: (179) 166-4306  Website: www.SwitchNote    [place]   First Call For Help  Phone number: [phone]    [place] Community Action ([place], [place], [place], [place], [place], [place])  Phone number: 845.267.4252 or [phone]    [facility]  Phone number: [phone]    Subsidized Apartment Complexes in [facility]  [address] [address]  [phone]    [facility]  [address] [id number] [phone]    [place]  [address] [address]  [phone]    [facility] [address]  [address] [address]  [phone]    [facility]  [address] [address]  [phone]    [place]  [address] [address]  [phone]

## 2025-07-20 ENCOUNTER — HOSPITAL ENCOUNTER (EMERGENCY)
Facility: HOSPITAL | Age: 55
Discharge: HOME | End: 2025-07-21
Attending: EMERGENCY MEDICINE
Payer: MEDICARE

## 2025-07-20 ENCOUNTER — APPOINTMENT (OUTPATIENT)
Facility: HOSPITAL | Age: 55
End: 2025-07-20
Payer: MEDICARE

## 2025-07-20 DIAGNOSIS — J44.1 COPD EXACERBATION (HCC): Primary | ICD-10-CM

## 2025-07-20 DIAGNOSIS — G89.4 CHRONIC PAIN SYNDROME: ICD-10-CM

## 2025-07-20 DIAGNOSIS — J96.11 CHRONIC HYPOXEMIC RESPIRATORY FAILURE (HCC): ICD-10-CM

## 2025-07-20 LAB
ALBUMIN SERPL-MCNC: 3.2 G/DL (ref 3.5–5)
ALBUMIN/GLOB SERPL: 0.8 (ref 1.1–2.2)
ALP SERPL-CCNC: 431 U/L (ref 45–117)
ALT SERPL-CCNC: 30 U/L (ref 12–78)
ANION GAP SERPL CALC-SCNC: 4 MMOL/L (ref 2–12)
AST SERPL-CCNC: 26 U/L (ref 15–37)
BASOPHILS # BLD: 0.05 K/UL (ref 0–0.1)
BASOPHILS NFR BLD: 0.4 % (ref 0–1)
BILIRUB SERPL-MCNC: 0.7 MG/DL (ref 0.2–1)
BUN SERPL-MCNC: 23 MG/DL (ref 6–20)
BUN/CREAT SERPL: 30 (ref 12–20)
CALCIUM SERPL-MCNC: 11.1 MG/DL (ref 8.5–10.1)
CHLORIDE SERPL-SCNC: 103 MMOL/L (ref 97–108)
CO2 SERPL-SCNC: 32 MMOL/L (ref 21–32)
CREAT SERPL-MCNC: 0.77 MG/DL (ref 0.55–1.02)
DIFFERENTIAL METHOD BLD: ABNORMAL
EOSINOPHIL # BLD: 0.02 K/UL (ref 0–0.4)
EOSINOPHIL NFR BLD: 0.2 % (ref 0–7)
ERYTHROCYTE [DISTWIDTH] IN BLOOD BY AUTOMATED COUNT: 15 % (ref 11.5–14.5)
GLOBULIN SER CALC-MCNC: 4.1 G/DL (ref 2–4)
GLUCOSE SERPL-MCNC: 255 MG/DL (ref 65–100)
HCT VFR BLD AUTO: 48 % (ref 35–47)
HGB BLD-MCNC: 14.1 G/DL (ref 11.5–16)
IMM GRANULOCYTES # BLD AUTO: 0.17 K/UL (ref 0–0.04)
IMM GRANULOCYTES NFR BLD AUTO: 1.5 % (ref 0–0.5)
LYMPHOCYTES # BLD: 0.72 K/UL (ref 0.8–3.5)
LYMPHOCYTES NFR BLD: 6.3 % (ref 12–49)
MAGNESIUM SERPL-MCNC: 1.8 MG/DL (ref 1.6–2.4)
MCH RBC QN AUTO: 27.4 PG (ref 26–34)
MCHC RBC AUTO-ENTMCNC: 29.4 G/DL (ref 30–36.5)
MCV RBC AUTO: 93.2 FL (ref 80–99)
MONOCYTES # BLD: 0.41 K/UL (ref 0–1)
MONOCYTES NFR BLD: 3.6 % (ref 5–13)
NEUTS SEG # BLD: 10.03 K/UL (ref 1.8–8)
NEUTS SEG NFR BLD: 88 % (ref 32–75)
NRBC # BLD: 0 K/UL (ref 0–0.01)
NRBC BLD-RTO: 0 PER 100 WBC
PLATELET # BLD AUTO: 170 K/UL (ref 150–400)
PMV BLD AUTO: 10.3 FL (ref 8.9–12.9)
POTASSIUM SERPL-SCNC: 4.6 MMOL/L (ref 3.5–5.1)
PROT SERPL-MCNC: 7.3 G/DL (ref 6.4–8.2)
RBC # BLD AUTO: 5.15 M/UL (ref 3.8–5.2)
RBC MORPH BLD: ABNORMAL
SODIUM SERPL-SCNC: 139 MMOL/L (ref 136–145)
TROPONIN I SERPL HS-MCNC: 53 NG/L (ref 0–51)
WBC # BLD AUTO: 11.4 K/UL (ref 3.6–11)

## 2025-07-20 PROCEDURE — 85025 COMPLETE CBC W/AUTO DIFF WBC: CPT

## 2025-07-20 PROCEDURE — 71045 X-RAY EXAM CHEST 1 VIEW: CPT

## 2025-07-20 PROCEDURE — 83735 ASSAY OF MAGNESIUM: CPT

## 2025-07-20 PROCEDURE — 80053 COMPREHEN METABOLIC PANEL: CPT

## 2025-07-20 PROCEDURE — 36415 COLL VENOUS BLD VENIPUNCTURE: CPT

## 2025-07-20 PROCEDURE — 99285 EMERGENCY DEPT VISIT HI MDM: CPT

## 2025-07-20 PROCEDURE — 83880 ASSAY OF NATRIURETIC PEPTIDE: CPT

## 2025-07-20 PROCEDURE — 84484 ASSAY OF TROPONIN QUANT: CPT

## 2025-07-20 PROCEDURE — 93005 ELECTROCARDIOGRAM TRACING: CPT | Performed by: EMERGENCY MEDICINE

## 2025-07-20 RX ORDER — IPRATROPIUM BROMIDE AND ALBUTEROL SULFATE 2.5; .5 MG/3ML; MG/3ML
3 SOLUTION RESPIRATORY (INHALATION)
Status: COMPLETED | OUTPATIENT
Start: 2025-07-20 | End: 2025-07-21

## 2025-07-20 RX ORDER — METHADONE HYDROCHLORIDE 10 MG/1
5 TABLET ORAL ONCE
Refills: 0 | Status: COMPLETED | OUTPATIENT
Start: 2025-07-20 | End: 2025-07-21

## 2025-07-20 RX ORDER — FUROSEMIDE 10 MG/ML
40 INJECTION INTRAMUSCULAR; INTRAVENOUS ONCE
Status: COMPLETED | OUTPATIENT
Start: 2025-07-20 | End: 2025-07-21

## 2025-07-20 RX ORDER — MAGNESIUM SULFATE IN WATER 40 MG/ML
2000 INJECTION, SOLUTION INTRAVENOUS
Status: COMPLETED | OUTPATIENT
Start: 2025-07-20 | End: 2025-07-21

## 2025-07-20 ASSESSMENT — PAIN DESCRIPTION - FREQUENCY: FREQUENCY: CONTINUOUS

## 2025-07-20 ASSESSMENT — PAIN DESCRIPTION - LOCATION: LOCATION: BACK;LEG;CHEST

## 2025-07-20 ASSESSMENT — PAIN DESCRIPTION - PAIN TYPE: TYPE: CHRONIC PAIN

## 2025-07-20 ASSESSMENT — PAIN DESCRIPTION - DESCRIPTORS: DESCRIPTORS: BURNING

## 2025-07-20 ASSESSMENT — PAIN SCALES - GENERAL: PAINLEVEL_OUTOF10: 6

## 2025-07-21 VITALS
TEMPERATURE: 97.8 F | RESPIRATION RATE: 22 BRPM | SYSTOLIC BLOOD PRESSURE: 199 MMHG | HEART RATE: 97 BPM | BODY MASS INDEX: 47.09 KG/M2 | OXYGEN SATURATION: 84 % | HEIGHT: 66 IN | WEIGHT: 293 LBS | DIASTOLIC BLOOD PRESSURE: 108 MMHG

## 2025-07-21 LAB
ARTERIAL PATENCY WRIST A: POSITIVE
BASE EXCESS BLD CALC-SCNC: 6.9 MMOL/L
BDY SITE: ABNORMAL
EKG ATRIAL RATE: 111 BPM
EKG DIAGNOSIS: NORMAL
EKG P AXIS: 82 DEGREES
EKG P-R INTERVAL: 160 MS
EKG Q-T INTERVAL: 302 MS
EKG QRS DURATION: 78 MS
EKG QTC CALCULATION (BAZETT): 410 MS
EKG R AXIS: 107 DEGREES
EKG T AXIS: 34 DEGREES
EKG VENTRICULAR RATE: 111 BPM
GAS FLOW.O2 O2 DELIVERY SYS: ABNORMAL
GLUCOSE BLD STRIP.AUTO-MCNC: 248 MG/DL (ref 65–117)
HCO3 BLD-SCNC: 34.1 MMOL/L (ref 21–28)
NT PRO BNP: 353 PG/ML
O2/TOTAL GAS SETTING VFR VENT: 40 %
PCO2 BLD: 56.5 MMHG (ref 35–48)
PH BLD: 7.39 (ref 7.35–7.45)
PO2 BLD: 91 MMHG (ref 83–108)
SAO2 % BLD: 96.7 % (ref 92–97)
SERVICE CMNT-IMP: ABNORMAL
SPECIMEN TYPE: ABNORMAL
TROPONIN I SERPL HS-MCNC: 48 NG/L (ref 0–51)

## 2025-07-21 PROCEDURE — 36600 WITHDRAWAL OF ARTERIAL BLOOD: CPT

## 2025-07-21 PROCEDURE — 84484 ASSAY OF TROPONIN QUANT: CPT

## 2025-07-21 PROCEDURE — 96365 THER/PROPH/DIAG IV INF INIT: CPT

## 2025-07-21 PROCEDURE — 96366 THER/PROPH/DIAG IV INF ADDON: CPT

## 2025-07-21 PROCEDURE — 36415 COLL VENOUS BLD VENIPUNCTURE: CPT

## 2025-07-21 PROCEDURE — 6360000002 HC RX W HCPCS: Performed by: EMERGENCY MEDICINE

## 2025-07-21 PROCEDURE — 6370000000 HC RX 637 (ALT 250 FOR IP): Performed by: EMERGENCY MEDICINE

## 2025-07-21 PROCEDURE — 82962 GLUCOSE BLOOD TEST: CPT

## 2025-07-21 PROCEDURE — 94640 AIRWAY INHALATION TREATMENT: CPT

## 2025-07-21 PROCEDURE — 2700000000 HC OXYGEN THERAPY PER DAY

## 2025-07-21 PROCEDURE — 2500000003 HC RX 250 WO HCPCS: Performed by: EMERGENCY MEDICINE

## 2025-07-21 PROCEDURE — 96375 TX/PRO/DX INJ NEW DRUG ADDON: CPT

## 2025-07-21 PROCEDURE — 82803 BLOOD GASES ANY COMBINATION: CPT

## 2025-07-21 RX ORDER — DOXYCYCLINE HYCLATE 100 MG
100 TABLET ORAL 2 TIMES DAILY
Qty: 20 TABLET | Refills: 0 | Status: SHIPPED | OUTPATIENT
Start: 2025-07-21 | End: 2025-07-25

## 2025-07-21 RX ORDER — DOXYCYCLINE HYCLATE 100 MG
100 TABLET ORAL
Status: COMPLETED | OUTPATIENT
Start: 2025-07-21 | End: 2025-07-21

## 2025-07-21 RX ORDER — PREDNISONE 50 MG/1
50 TABLET ORAL DAILY
Qty: 5 TABLET | Refills: 0 | Status: ON HOLD | OUTPATIENT
Start: 2025-07-21 | End: 2025-07-26

## 2025-07-21 RX ORDER — HYDROMORPHONE HYDROCHLORIDE 1 MG/ML
2 INJECTION, SOLUTION INTRAMUSCULAR; INTRAVENOUS; SUBCUTANEOUS ONCE
Status: COMPLETED | OUTPATIENT
Start: 2025-07-21 | End: 2025-07-21

## 2025-07-21 RX ORDER — LOPERAMIDE HYDROCHLORIDE 2 MG/1
4 CAPSULE ORAL
Status: COMPLETED | OUTPATIENT
Start: 2025-07-21 | End: 2025-07-21

## 2025-07-21 RX ADMIN — MAGNESIUM SULFATE HEPTAHYDRATE 2000 MG: 40 INJECTION, SOLUTION INTRAVENOUS at 00:21

## 2025-07-21 RX ADMIN — FUROSEMIDE 40 MG: 10 INJECTION, SOLUTION INTRAMUSCULAR; INTRAVENOUS at 00:25

## 2025-07-21 RX ADMIN — DOXYCYCLINE HYCLATE 100 MG: 100 TABLET, COATED ORAL at 02:11

## 2025-07-21 RX ADMIN — APIXABAN 5 MG: 5 TABLET, FILM COATED ORAL at 00:17

## 2025-07-21 RX ADMIN — METHADONE HYDROCHLORIDE 5 MG: 10 TABLET ORAL at 00:16

## 2025-07-21 RX ADMIN — HYDROMORPHONE HYDROCHLORIDE 2 MG: 1 INJECTION, SOLUTION INTRAMUSCULAR; INTRAVENOUS; SUBCUTANEOUS at 04:22

## 2025-07-21 RX ADMIN — IPRATROPIUM BROMIDE AND ALBUTEROL SULFATE 3 DOSE: .5; 3 SOLUTION RESPIRATORY (INHALATION) at 00:48

## 2025-07-21 RX ADMIN — WATER 125 MG: 1 INJECTION INTRAMUSCULAR; INTRAVENOUS; SUBCUTANEOUS at 00:22

## 2025-07-21 RX ADMIN — LOPERAMIDE HYDROCHLORIDE 4 MG: 2 CAPSULE ORAL at 06:35

## 2025-07-21 RX ADMIN — INSULIN HUMAN 8 UNITS: 100 INJECTION, SOLUTION PARENTERAL at 06:32

## 2025-07-21 ASSESSMENT — ENCOUNTER SYMPTOMS
NAUSEA: 0
SHORTNESS OF BREATH: 1
ABDOMINAL PAIN: 0
COLOR CHANGE: 0
STRIDOR: 0
DIARRHEA: 0
BACK PAIN: 1
BLOOD IN STOOL: 0
VOMITING: 0
COUGH: 0
WHEEZING: 1

## 2025-07-21 ASSESSMENT — PAIN SCALES - GENERAL
PAINLEVEL_OUTOF10: 6
PAINLEVEL_OUTOF10: 6

## 2025-07-21 NOTE — ED NOTES
[facility] here to  patient and transport her home. Paperwork given to patient and explained. Answered all patients questions and concerns.

## 2025-07-21 NOTE — ED PROVIDER NOTES
Ref Range    NT Pro- (H) <125 PG/ML   Troponin    Collection Time: 07/21/25 12:37 AM   Result Value Ref Range    Troponin, High Sensitivity 48 0 - 51 ng/L   Arterial Blood Gas, POC    Collection Time: 07/21/25 12:56 AM   Result Value Ref Range    FIO2 40 %    POC pH 7.39 7.35 - 7.45      POC pCO2 56.5 (H) 35.0 - 48.0 MMHG    POC PO2 91 83 - 108 MMHG    POC HCO3 34.1 (H) 21 - 28 MMOL/L    POC O2 SAT 96.7 92 - 97 %    Base Excess 6.9 mmol/L    Site LEFT RADIAL      DEVICE NASAL CANNULA      POC David's Test Positive      Specimen type: ARTERIAL          Radiographic Results:  Non-plain film images such as CT, Ultrasound and MRI are read by the radiologist. Plain radiographic images are visualized and preliminarily interpreted by the ED Provider with the findings documented in the MDM section.     XR CHEST PORTABLE   Final Result      Unchanged moderately large left pleural effusion with left basilar airspace   disease.         Electronically signed by Joss Posada            ED COURSE       I reviewed and personally interpreted the patient's available laboratory results. Pertinent findings demonstrate:  Na 139, k 4.6, cr. 0.77, glucose 255, anion gap 4, bicarb 32  Troponin 53->48  Bnp 353  Venous blood gas: normal ph, oxygenation an dventilation  Chronically elevated alk phos, rest of LFTS normal  Wbc 11.4, hgb 14, [id number]    I reviewed and personally interpreted the patient's imaging studies as soon as images were available. I also reviewed the radiologist's read and impression of the studies. Findings demonstrate:  CXR- left pleural effusion, not changed from prior      REASSESSMENT AND FINAL IMPRESSION:   I have re-examined the patient. Markedly improved since last treatment and now speaking in full sentences. She never went above her baseline oxygen requirement and blood gas does not show severe co2 retention. Cxr demonstrates known effusion, stable. No other findings. Her labs are overall reassuring,

## 2025-07-21 NOTE — DISCHARGE INSTRUCTIONS
It was a pleasure taking care of you in our Emergency Department today.  We know that when you come to [facility], you are entrusting us with your health, comfort, and safety.  Our physicians and nurses honor that trust, and truly appreciate the opportunity to care for you and your loved ones.    We also value your feedback.  If you receive a survey about your Emergency Department experience today, please fill it out.  We care about our patients' feedback, and we listen to what you have to say.  Thank you!       --- Dr. Irene Ceballos MD

## 2025-07-25 ENCOUNTER — HOSPITAL ENCOUNTER (INPATIENT)
Facility: HOSPITAL | Age: 55
LOS: 7 days | Discharge: HOME | DRG: 190 | End: 2025-08-01
Attending: STUDENT IN AN ORGANIZED HEALTH CARE EDUCATION/TRAINING PROGRAM | Admitting: STUDENT IN AN ORGANIZED HEALTH CARE EDUCATION/TRAINING PROGRAM
Payer: MEDICARE

## 2025-07-25 ENCOUNTER — APPOINTMENT (OUTPATIENT)
Facility: HOSPITAL | Age: 55
DRG: 190 | End: 2025-07-25
Payer: MEDICARE

## 2025-07-25 DIAGNOSIS — R10.9 CHRONIC FLANK PAIN: ICD-10-CM

## 2025-07-25 DIAGNOSIS — J96.21 ACUTE ON CHRONIC HYPOXIC RESPIRATORY FAILURE (HCC): Primary | ICD-10-CM

## 2025-07-25 DIAGNOSIS — J81.0 ACUTE PULMONARY EDEMA (HCC): ICD-10-CM

## 2025-07-25 DIAGNOSIS — G89.29 CHRONIC FLANK PAIN: ICD-10-CM

## 2025-07-25 PROBLEM — J44.1 COPD EXACERBATION (HCC): Status: ACTIVE | Noted: 2025-07-25

## 2025-07-25 LAB
ALBUMIN SERPL-MCNC: 3.2 G/DL (ref 3.5–5)
ALBUMIN/GLOB SERPL: 0.8 (ref 1.1–2.2)
ALP SERPL-CCNC: 527 U/L (ref 45–117)
ALT SERPL-CCNC: 29 U/L (ref 12–78)
ANION GAP SERPL CALC-SCNC: 5 MMOL/L (ref 2–12)
AST SERPL-CCNC: 14 U/L (ref 15–37)
BASE EXCESS BLDV CALC-SCNC: 8.7 MMOL/L
BASOPHILS # BLD: 0.06 K/UL (ref 0–0.1)
BASOPHILS NFR BLD: 0.4 % (ref 0–1)
BILIRUB SERPL-MCNC: 0.8 MG/DL (ref 0.2–1)
BUN SERPL-MCNC: 20 MG/DL (ref 6–20)
BUN/CREAT SERPL: 28 (ref 12–20)
CALCIUM SERPL-MCNC: 11.2 MG/DL (ref 8.5–10.1)
CHLORIDE SERPL-SCNC: 102 MMOL/L (ref 97–108)
CO2 SERPL-SCNC: 31 MMOL/L (ref 21–32)
CREAT SERPL-MCNC: 0.72 MG/DL (ref 0.55–1.02)
DIFFERENTIAL METHOD BLD: ABNORMAL
EOSINOPHIL # BLD: 0.07 K/UL (ref 0–0.4)
EOSINOPHIL NFR BLD: 0.5 % (ref 0–7)
ERYTHROCYTE [DISTWIDTH] IN BLOOD BY AUTOMATED COUNT: 15.2 % (ref 11.5–14.5)
FLUAV RNA SPEC QL NAA+PROBE: NOT DETECTED
FLUBV RNA SPEC QL NAA+PROBE: NOT DETECTED
GLOBULIN SER CALC-MCNC: 4.1 G/DL (ref 2–4)
GLUCOSE BLD STRIP.AUTO-MCNC: 162 MG/DL (ref 65–117)
GLUCOSE BLD STRIP.AUTO-MCNC: 246 MG/DL (ref 65–117)
GLUCOSE BLD STRIP.AUTO-MCNC: 336 MG/DL (ref 65–117)
GLUCOSE SERPL-MCNC: 186 MG/DL (ref 65–100)
HCO3 BLDV-SCNC: 36.5 MMOL/L (ref 23–28)
HCT VFR BLD AUTO: 47.6 % (ref 35–47)
HGB BLD-MCNC: 14.4 G/DL (ref 11.5–16)
IMM GRANULOCYTES # BLD AUTO: 0.15 K/UL (ref 0–0.04)
IMM GRANULOCYTES NFR BLD AUTO: 1.1 % (ref 0–0.5)
LACTATE BLD-SCNC: 1.63 MMOL/L (ref 0.4–2)
LYMPHOCYTES # BLD: 1.35 K/UL (ref 0.8–3.5)
LYMPHOCYTES NFR BLD: 9.6 % (ref 12–49)
MAGNESIUM SERPL-MCNC: 1.9 MG/DL (ref 1.6–2.4)
MCH RBC QN AUTO: 28 PG (ref 26–34)
MCHC RBC AUTO-ENTMCNC: 30.3 G/DL (ref 30–36.5)
MCV RBC AUTO: 92.6 FL (ref 80–99)
MONOCYTES # BLD: 1.54 K/UL (ref 0–1)
MONOCYTES NFR BLD: 11 % (ref 5–13)
NEUTS SEG # BLD: 10.88 K/UL (ref 1.8–8)
NEUTS SEG NFR BLD: 77.4 % (ref 32–75)
NRBC # BLD: 0 K/UL (ref 0–0.01)
NRBC BLD-RTO: 0 PER 100 WBC
NT PRO BNP: 406 PG/ML
PCO2 BLDV: 58.9 MMHG (ref 41–51)
PH BLDV: 7.4 (ref 7.32–7.42)
PLATELET # BLD AUTO: 209 K/UL (ref 150–400)
PMV BLD AUTO: 10.3 FL (ref 8.9–12.9)
PO2 BLDV: 35 MMHG (ref 25–40)
POTASSIUM SERPL-SCNC: 3.5 MMOL/L (ref 3.5–5.1)
PROCALCITONIN SERPL-MCNC: <0.05 NG/ML
PROT SERPL-MCNC: 7.3 G/DL (ref 6.4–8.2)
RBC # BLD AUTO: 5.14 M/UL (ref 3.8–5.2)
SAO2 % BLDV: 65.2 % (ref 65–88)
SARS-COV-2 RNA RESP QL NAA+PROBE: NOT DETECTED
SERVICE CMNT-IMP: ABNORMAL
SODIUM SERPL-SCNC: 138 MMOL/L (ref 136–145)
SOURCE: NORMAL
SPECIMEN TYPE: ABNORMAL
TROPONIN I SERPL HS-MCNC: 49 NG/L (ref 0–51)
WBC # BLD AUTO: 14.1 K/UL (ref 3.6–11)

## 2025-07-25 PROCEDURE — 2500000003 HC RX 250 WO HCPCS: Performed by: NURSE PRACTITIONER

## 2025-07-25 PROCEDURE — 6360000002 HC RX W HCPCS: Performed by: STUDENT IN AN ORGANIZED HEALTH CARE EDUCATION/TRAINING PROGRAM

## 2025-07-25 PROCEDURE — 82803 BLOOD GASES ANY COMBINATION: CPT

## 2025-07-25 PROCEDURE — 84145 PROCALCITONIN (PCT): CPT

## 2025-07-25 PROCEDURE — 80053 COMPREHEN METABOLIC PANEL: CPT

## 2025-07-25 PROCEDURE — 85025 COMPLETE CBC W/AUTO DIFF WBC: CPT

## 2025-07-25 PROCEDURE — 87040 BLOOD CULTURE FOR BACTERIA: CPT

## 2025-07-25 PROCEDURE — 1100000000 HC RM PRIVATE

## 2025-07-25 PROCEDURE — 6360000002 HC RX W HCPCS: Performed by: NURSE PRACTITIONER

## 2025-07-25 PROCEDURE — 6370000000 HC RX 637 (ALT 250 FOR IP): Performed by: NURSE PRACTITIONER

## 2025-07-25 PROCEDURE — 83735 ASSAY OF MAGNESIUM: CPT

## 2025-07-25 PROCEDURE — 94640 AIRWAY INHALATION TREATMENT: CPT

## 2025-07-25 PROCEDURE — 93005 ELECTROCARDIOGRAM TRACING: CPT

## 2025-07-25 PROCEDURE — 5A09457 ASSISTANCE WITH RESPIRATORY VENTILATION, 24-96 CONSECUTIVE HOURS, CONTINUOUS POSITIVE AIRWAY PRESSURE: ICD-10-PCS | Performed by: STUDENT IN AN ORGANIZED HEALTH CARE EDUCATION/TRAINING PROGRAM

## 2025-07-25 PROCEDURE — 87636 SARSCOV2 & INF A&B AMP PRB: CPT

## 2025-07-25 PROCEDURE — 99285 EMERGENCY DEPT VISIT HI MDM: CPT

## 2025-07-25 PROCEDURE — 96374 THER/PROPH/DIAG INJ IV PUSH: CPT

## 2025-07-25 PROCEDURE — 83880 ASSAY OF NATRIURETIC PEPTIDE: CPT

## 2025-07-25 PROCEDURE — 71045 X-RAY EXAM CHEST 1 VIEW: CPT

## 2025-07-25 PROCEDURE — 36415 COLL VENOUS BLD VENIPUNCTURE: CPT

## 2025-07-25 PROCEDURE — 83605 ASSAY OF LACTIC ACID: CPT

## 2025-07-25 PROCEDURE — 96375 TX/PRO/DX INJ NEW DRUG ADDON: CPT

## 2025-07-25 PROCEDURE — 82962 GLUCOSE BLOOD TEST: CPT

## 2025-07-25 PROCEDURE — 84484 ASSAY OF TROPONIN QUANT: CPT

## 2025-07-25 RX ORDER — FUROSEMIDE 40 MG/1
40 TABLET ORAL 2 TIMES DAILY
Status: DISCONTINUED | OUTPATIENT
Start: 2025-07-25 | End: 2025-08-01 | Stop reason: HOSPADM

## 2025-07-25 RX ORDER — IPRATROPIUM BROMIDE AND ALBUTEROL SULFATE 2.5; .5 MG/3ML; MG/3ML
1 SOLUTION RESPIRATORY (INHALATION) 4 TIMES DAILY
Status: DISCONTINUED | OUTPATIENT
Start: 2025-07-25 | End: 2025-07-26

## 2025-07-25 RX ORDER — INSULIN GLARGINE 100 [IU]/ML
40 INJECTION, SOLUTION SUBCUTANEOUS DAILY
Status: DISCONTINUED | OUTPATIENT
Start: 2025-07-25 | End: 2025-08-01 | Stop reason: HOSPADM

## 2025-07-25 RX ORDER — NYSTATIN 100000 [USP'U]/G
POWDER TOPICAL 4 TIMES DAILY
COMMUNITY

## 2025-07-25 RX ORDER — GLIPIZIDE 10 MG/1
10 TABLET ORAL DAILY
COMMUNITY

## 2025-07-25 RX ORDER — IPRATROPIUM BROMIDE AND ALBUTEROL SULFATE 2.5; .5 MG/3ML; MG/3ML
1 SOLUTION RESPIRATORY (INHALATION) 4 TIMES DAILY
Status: DISCONTINUED | OUTPATIENT
Start: 2025-07-25 | End: 2025-07-25

## 2025-07-25 RX ORDER — HYDROMORPHONE HYDROCHLORIDE 1 MG/ML
1 INJECTION, SOLUTION INTRAMUSCULAR; INTRAVENOUS; SUBCUTANEOUS
Status: DISCONTINUED | OUTPATIENT
Start: 2025-07-25 | End: 2025-07-28

## 2025-07-25 RX ORDER — ALBUTEROL SULFATE 0.83 MG/ML
2.5 SOLUTION RESPIRATORY (INHALATION) EVERY 6 HOURS PRN
Status: DISCONTINUED | OUTPATIENT
Start: 2025-07-25 | End: 2025-08-01 | Stop reason: HOSPADM

## 2025-07-25 RX ORDER — FUROSEMIDE 10 MG/ML
40 INJECTION INTRAMUSCULAR; INTRAVENOUS ONCE
Status: COMPLETED | OUTPATIENT
Start: 2025-07-25 | End: 2025-07-25

## 2025-07-25 RX ORDER — ONDANSETRON 4 MG/1
4 TABLET, ORALLY DISINTEGRATING ORAL EVERY 8 HOURS PRN
Status: DISCONTINUED | OUTPATIENT
Start: 2025-07-25 | End: 2025-08-01 | Stop reason: HOSPADM

## 2025-07-25 RX ORDER — FLUTICASONE PROPIONATE 50 MCG
2 SPRAY, SUSPENSION (ML) NASAL DAILY
Status: DISCONTINUED | OUTPATIENT
Start: 2025-07-25 | End: 2025-08-01 | Stop reason: HOSPADM

## 2025-07-25 RX ORDER — HYDROMORPHONE HYDROCHLORIDE 2 MG/1
4 TABLET ORAL EVERY 4 HOURS PRN
Refills: 0 | Status: DISCONTINUED | OUTPATIENT
Start: 2025-07-25 | End: 2025-08-01 | Stop reason: HOSPADM

## 2025-07-25 RX ORDER — POTASSIUM CHLORIDE 1500 MG/1
40 TABLET, EXTENDED RELEASE ORAL PRN
Status: DISCONTINUED | OUTPATIENT
Start: 2025-07-25 | End: 2025-08-01 | Stop reason: HOSPADM

## 2025-07-25 RX ORDER — ACETAMINOPHEN 650 MG/1
650 SUPPOSITORY RECTAL EVERY 6 HOURS PRN
Status: DISCONTINUED | OUTPATIENT
Start: 2025-07-25 | End: 2025-08-01 | Stop reason: HOSPADM

## 2025-07-25 RX ORDER — HYDROMORPHONE HYDROCHLORIDE 1 MG/ML
1 INJECTION, SOLUTION INTRAMUSCULAR; INTRAVENOUS; SUBCUTANEOUS
Status: DISCONTINUED | OUTPATIENT
Start: 2025-07-25 | End: 2025-07-25

## 2025-07-25 RX ORDER — ACETAMINOPHEN 325 MG/1
650 TABLET ORAL EVERY 4 HOURS PRN
Status: DISCONTINUED | OUTPATIENT
Start: 2025-07-25 | End: 2025-07-25 | Stop reason: SDUPTHER

## 2025-07-25 RX ORDER — GUAIFENESIN 600 MG/1
1200 TABLET, EXTENDED RELEASE ORAL 2 TIMES DAILY
COMMUNITY

## 2025-07-25 RX ORDER — HYDROXYZINE HYDROCHLORIDE 25 MG/1
25 TABLET, FILM COATED ORAL EVERY 6 HOURS PRN
Status: DISCONTINUED | OUTPATIENT
Start: 2025-07-25 | End: 2025-08-01 | Stop reason: HOSPADM

## 2025-07-25 RX ORDER — METOPROLOL TARTRATE 25 MG/1
25 TABLET, FILM COATED ORAL 2 TIMES DAILY
Status: DISCONTINUED | OUTPATIENT
Start: 2025-07-25 | End: 2025-08-01 | Stop reason: HOSPADM

## 2025-07-25 RX ORDER — GUAIFENESIN AND DEXTROMETHORPHAN HYDROBROMIDE 10; 100 MG/5ML; MG/5ML
20 SYRUP ORAL 2 TIMES DAILY PRN
Status: ON HOLD | COMMUNITY
End: 2025-07-29 | Stop reason: HOSPADM

## 2025-07-25 RX ORDER — INSULIN LISPRO 100 [IU]/ML
0-8 INJECTION, SOLUTION INTRAVENOUS; SUBCUTANEOUS
Status: DISCONTINUED | OUTPATIENT
Start: 2025-07-25 | End: 2025-08-01 | Stop reason: HOSPADM

## 2025-07-25 RX ORDER — ONDANSETRON 2 MG/ML
4 INJECTION INTRAMUSCULAR; INTRAVENOUS EVERY 6 HOURS PRN
Status: DISCONTINUED | OUTPATIENT
Start: 2025-07-25 | End: 2025-08-01 | Stop reason: HOSPADM

## 2025-07-25 RX ORDER — SODIUM CHLORIDE 0.9 % (FLUSH) 0.9 %
5-40 SYRINGE (ML) INJECTION EVERY 12 HOURS SCHEDULED
Status: DISCONTINUED | OUTPATIENT
Start: 2025-07-25 | End: 2025-08-01 | Stop reason: HOSPADM

## 2025-07-25 RX ORDER — POLYETHYLENE GLYCOL 3350 17 G/17G
17 POWDER, FOR SOLUTION ORAL DAILY PRN
Status: DISCONTINUED | OUTPATIENT
Start: 2025-07-25 | End: 2025-08-01 | Stop reason: HOSPADM

## 2025-07-25 RX ORDER — SODIUM CHLORIDE 9 MG/ML
INJECTION, SOLUTION INTRAVENOUS PRN
Status: DISCONTINUED | OUTPATIENT
Start: 2025-07-25 | End: 2025-08-01 | Stop reason: HOSPADM

## 2025-07-25 RX ORDER — DEXTROSE MONOHYDRATE 100 MG/ML
INJECTION, SOLUTION INTRAVENOUS CONTINUOUS PRN
Status: DISCONTINUED | OUTPATIENT
Start: 2025-07-25 | End: 2025-08-01 | Stop reason: HOSPADM

## 2025-07-25 RX ORDER — ALBUTEROL SULFATE 90 UG/1
2 INHALANT RESPIRATORY (INHALATION) 4 TIMES DAILY PRN
Status: DISCONTINUED | OUTPATIENT
Start: 2025-07-25 | End: 2025-08-01 | Stop reason: HOSPADM

## 2025-07-25 RX ORDER — MAGNESIUM SULFATE IN WATER 40 MG/ML
2000 INJECTION, SOLUTION INTRAVENOUS PRN
Status: DISCONTINUED | OUTPATIENT
Start: 2025-07-25 | End: 2025-08-01 | Stop reason: HOSPADM

## 2025-07-25 RX ORDER — SODIUM CHLORIDE 0.9 % (FLUSH) 0.9 %
5-40 SYRINGE (ML) INJECTION PRN
Status: DISCONTINUED | OUTPATIENT
Start: 2025-07-25 | End: 2025-08-01 | Stop reason: HOSPADM

## 2025-07-25 RX ORDER — IPRATROPIUM BROMIDE AND ALBUTEROL SULFATE 2.5; .5 MG/3ML; MG/3ML
SOLUTION RESPIRATORY (INHALATION)
Status: DISPENSED
Start: 2025-07-25 | End: 2025-07-26

## 2025-07-25 RX ORDER — POTASSIUM CHLORIDE 7.45 MG/ML
10 INJECTION INTRAVENOUS PRN
Status: DISCONTINUED | OUTPATIENT
Start: 2025-07-25 | End: 2025-08-01 | Stop reason: HOSPADM

## 2025-07-25 RX ORDER — GLUCAGON 1 MG/ML
1 KIT INJECTION PRN
Status: DISCONTINUED | OUTPATIENT
Start: 2025-07-25 | End: 2025-08-01 | Stop reason: HOSPADM

## 2025-07-25 RX ORDER — ANASTROZOLE 1 MG/1
1 TABLET ORAL DAILY
Status: DISCONTINUED | OUTPATIENT
Start: 2025-07-25 | End: 2025-08-01 | Stop reason: HOSPADM

## 2025-07-25 RX ORDER — CETIRIZINE HYDROCHLORIDE 10 MG/1
10 TABLET ORAL DAILY
Status: DISCONTINUED | OUTPATIENT
Start: 2025-07-25 | End: 2025-08-01 | Stop reason: HOSPADM

## 2025-07-25 RX ORDER — LACTULOSE 10 G/15ML
30 SOLUTION ORAL DAILY
COMMUNITY

## 2025-07-25 RX ORDER — SENNA AND DOCUSATE SODIUM 50; 8.6 MG/1; MG/1
2 TABLET, FILM COATED ORAL DAILY
Status: DISCONTINUED | OUTPATIENT
Start: 2025-07-25 | End: 2025-08-01 | Stop reason: HOSPADM

## 2025-07-25 RX ORDER — OXYCODONE HCL 5 MG/5 ML
5 SOLUTION, ORAL ORAL EVERY 4 HOURS PRN
Refills: 0 | Status: DISCONTINUED | OUTPATIENT
Start: 2025-07-25 | End: 2025-07-25

## 2025-07-25 RX ORDER — ATORVASTATIN CALCIUM 10 MG/1
10 TABLET, FILM COATED ORAL DAILY
Status: DISCONTINUED | OUTPATIENT
Start: 2025-07-25 | End: 2025-08-01 | Stop reason: HOSPADM

## 2025-07-25 RX ORDER — ONDANSETRON 2 MG/ML
4 INJECTION INTRAMUSCULAR; INTRAVENOUS EVERY 4 HOURS PRN
Status: DISCONTINUED | OUTPATIENT
Start: 2025-07-25 | End: 2025-07-28 | Stop reason: SDUPTHER

## 2025-07-25 RX ORDER — ACETAMINOPHEN 325 MG/1
650 TABLET ORAL EVERY 6 HOURS PRN
Status: DISCONTINUED | OUTPATIENT
Start: 2025-07-25 | End: 2025-08-01 | Stop reason: HOSPADM

## 2025-07-25 RX ORDER — FENTANYL CITRATE 50 UG/ML
50 INJECTION, SOLUTION INTRAMUSCULAR; INTRAVENOUS
Refills: 0 | Status: COMPLETED | OUTPATIENT
Start: 2025-07-25 | End: 2025-07-25

## 2025-07-25 RX ORDER — POLYETHYLENE GLYCOL 3350 17 G/17G
17 POWDER, FOR SOLUTION ORAL DAILY
COMMUNITY

## 2025-07-25 RX ORDER — HYDRALAZINE HYDROCHLORIDE 20 MG/ML
10 INJECTION INTRAMUSCULAR; INTRAVENOUS EVERY 6 HOURS PRN
Status: DISCONTINUED | OUTPATIENT
Start: 2025-07-25 | End: 2025-08-01 | Stop reason: HOSPADM

## 2025-07-25 RX ORDER — PREDNISONE 20 MG/1
20 TABLET ORAL DAILY
Status: DISCONTINUED | OUTPATIENT
Start: 2025-07-25 | End: 2025-08-01 | Stop reason: HOSPADM

## 2025-07-25 RX ORDER — METHADONE HYDROCHLORIDE 10 MG/1
5 TABLET ORAL 2 TIMES DAILY
Refills: 0 | Status: DISCONTINUED | OUTPATIENT
Start: 2025-07-25 | End: 2025-08-01 | Stop reason: HOSPADM

## 2025-07-25 RX ORDER — DOCUSATE SODIUM 100 MG/1
100 CAPSULE, LIQUID FILLED ORAL 2 TIMES DAILY
COMMUNITY

## 2025-07-25 RX ORDER — HYDROMORPHONE HYDROCHLORIDE 1 MG/ML
1 INJECTION, SOLUTION INTRAMUSCULAR; INTRAVENOUS; SUBCUTANEOUS EVERY 4 HOURS PRN
Status: DISCONTINUED | OUTPATIENT
Start: 2025-07-25 | End: 2025-07-28

## 2025-07-25 RX ORDER — BUDESONIDE 0.5 MG/2ML
1 INHALANT ORAL
Status: DISCONTINUED | OUTPATIENT
Start: 2025-07-25 | End: 2025-08-01 | Stop reason: HOSPADM

## 2025-07-25 RX ORDER — HYDROMORPHONE HYDROCHLORIDE 1 MG/ML
1 INJECTION, SOLUTION INTRAMUSCULAR; INTRAVENOUS; SUBCUTANEOUS
Status: COMPLETED | OUTPATIENT
Start: 2025-07-25 | End: 2025-07-25

## 2025-07-25 RX ORDER — ARFORMOTEROL TARTRATE 15 UG/2ML
15 SOLUTION RESPIRATORY (INHALATION)
Status: DISCONTINUED | OUTPATIENT
Start: 2025-07-25 | End: 2025-08-01 | Stop reason: HOSPADM

## 2025-07-25 RX ORDER — AMMONIUM LACTATE 12 G/100G
LOTION TOPICAL DAILY
Status: DISCONTINUED | OUTPATIENT
Start: 2025-07-25 | End: 2025-08-01 | Stop reason: HOSPADM

## 2025-07-25 RX ORDER — ASPIRIN 81 MG/1
81 TABLET, CHEWABLE ORAL DAILY
Status: DISCONTINUED | OUTPATIENT
Start: 2025-07-25 | End: 2025-08-01 | Stop reason: HOSPADM

## 2025-07-25 RX ORDER — LIDOCAINE 4 G/G
3 PATCH TOPICAL EVERY 24 HOURS
Status: DISCONTINUED | OUTPATIENT
Start: 2025-07-25 | End: 2025-08-01 | Stop reason: HOSPADM

## 2025-07-25 RX ORDER — ENOXAPARIN SODIUM 100 MG/ML
30 INJECTION SUBCUTANEOUS 2 TIMES DAILY
Status: DISCONTINUED | OUTPATIENT
Start: 2025-07-25 | End: 2025-07-25

## 2025-07-25 RX ORDER — METHOCARBAMOL 750 MG/1
750 TABLET, FILM COATED ORAL 3 TIMES DAILY PRN
Status: DISCONTINUED | OUTPATIENT
Start: 2025-07-25 | End: 2025-07-31

## 2025-07-25 RX ORDER — IOPAMIDOL 755 MG/ML
100 INJECTION, SOLUTION INTRAVASCULAR
Status: COMPLETED | OUTPATIENT
Start: 2025-07-25 | End: 2025-07-28

## 2025-07-25 RX ORDER — CALCIUM CARBONATE 500 MG/1
500 TABLET, CHEWABLE ORAL 3 TIMES DAILY PRN
Status: DISCONTINUED | OUTPATIENT
Start: 2025-07-25 | End: 2025-08-01 | Stop reason: HOSPADM

## 2025-07-25 RX ORDER — PANTOPRAZOLE SODIUM 40 MG/1
40 TABLET, DELAYED RELEASE ORAL
Status: DISCONTINUED | OUTPATIENT
Start: 2025-07-26 | End: 2025-07-31

## 2025-07-25 RX ADMIN — FUROSEMIDE 40 MG: 40 TABLET ORAL at 21:39

## 2025-07-25 RX ADMIN — SODIUM CHLORIDE, PRESERVATIVE FREE 10 ML: 5 INJECTION INTRAVENOUS at 21:48

## 2025-07-25 RX ADMIN — FUROSEMIDE 40 MG: 10 INJECTION, SOLUTION INTRAMUSCULAR; INTRAVENOUS at 11:45

## 2025-07-25 RX ADMIN — METHADONE HYDROCHLORIDE 5 MG: 10 TABLET ORAL at 21:39

## 2025-07-25 RX ADMIN — FENTANYL CITRATE 50 MCG: 50 INJECTION INTRAMUSCULAR; INTRAVENOUS at 11:45

## 2025-07-25 RX ADMIN — BUDESONIDE 1000 MCG: 0.5 INHALANT RESPIRATORY (INHALATION) at 22:29

## 2025-07-25 RX ADMIN — PREDNISONE 20 MG: 20 TABLET ORAL at 15:02

## 2025-07-25 RX ADMIN — PANTOPRAZOLE SODIUM 40 MG: 40 INJECTION, POWDER, FOR SOLUTION INTRAVENOUS at 15:01

## 2025-07-25 RX ADMIN — MELATONIN 6 MG: at 21:39

## 2025-07-25 RX ADMIN — INSULIN GLARGINE 40 UNITS: 100 INJECTION, SOLUTION SUBCUTANEOUS at 15:00

## 2025-07-25 RX ADMIN — HYDROMORPHONE HYDROCHLORIDE 1 MG: 1 INJECTION, SOLUTION INTRAMUSCULAR; INTRAVENOUS; SUBCUTANEOUS at 10:48

## 2025-07-25 RX ADMIN — INSULIN LISPRO 2 UNITS: 100 INJECTION, SOLUTION INTRAVENOUS; SUBCUTANEOUS at 18:22

## 2025-07-25 RX ADMIN — APIXABAN 5 MG: 5 TABLET, FILM COATED ORAL at 21:39

## 2025-07-25 RX ADMIN — ACETAMINOPHEN 650 MG: 325 TABLET ORAL at 19:12

## 2025-07-25 RX ADMIN — ARFORMOTEROL TARTRATE 15 MCG: 15 SOLUTION RESPIRATORY (INHALATION) at 22:29

## 2025-07-25 RX ADMIN — APIXABAN 5 MG: 5 TABLET, FILM COATED ORAL at 15:12

## 2025-07-25 RX ADMIN — FUROSEMIDE 40 MG: 40 TABLET ORAL at 15:12

## 2025-07-25 RX ADMIN — METHADONE HYDROCHLORIDE 5 MG: 10 TABLET ORAL at 15:01

## 2025-07-25 RX ADMIN — METOPROLOL TARTRATE 25 MG: 50 TABLET, FILM COATED ORAL at 21:39

## 2025-07-25 RX ADMIN — SENNOSIDES AND DOCUSATE SODIUM 2 TABLET: 8.6; 5 TABLET ORAL at 15:02

## 2025-07-25 RX ADMIN — ASPIRIN 81 MG: 81 TABLET, CHEWABLE ORAL at 15:12

## 2025-07-25 RX ADMIN — IPRATROPIUM BROMIDE AND ALBUTEROL SULFATE 1 DOSE: .5; 3 SOLUTION RESPIRATORY (INHALATION) at 22:24

## 2025-07-25 RX ADMIN — ENOXAPARIN SODIUM 30 MG: 100 INJECTION SUBCUTANEOUS at 13:57

## 2025-07-25 RX ADMIN — METOPROLOL TARTRATE 25 MG: 50 TABLET, FILM COATED ORAL at 15:02

## 2025-07-25 RX ADMIN — CETIRIZINE HYDROCHLORIDE 10 MG: 10 TABLET, FILM COATED ORAL at 15:12

## 2025-07-25 RX ADMIN — ATORVASTATIN CALCIUM 10 MG: 10 TABLET, FILM COATED ORAL at 15:12

## 2025-07-25 RX ADMIN — INSULIN LISPRO 6 UNITS: 100 INJECTION, SOLUTION INTRAVENOUS; SUBCUTANEOUS at 21:39

## 2025-07-25 RX ADMIN — IPRATROPIUM BROMIDE AND ALBUTEROL SULFATE 1 DOSE: .5; 3 SOLUTION RESPIRATORY (INHALATION) at 14:56

## 2025-07-25 RX ADMIN — WATER 2000 MG: 1 INJECTION INTRAMUSCULAR; INTRAVENOUS; SUBCUTANEOUS at 15:14

## 2025-07-25 ASSESSMENT — PAIN DESCRIPTION - ORIENTATION
ORIENTATION: LEFT
ORIENTATION: LEFT
ORIENTATION: MID

## 2025-07-25 ASSESSMENT — PAIN DESCRIPTION - LOCATION
LOCATION: BACK;SACRUM;LEG
LOCATION: BACK;SACRUM
LOCATION: FLANK

## 2025-07-25 ASSESSMENT — PAIN SCALES - GENERAL
PAINLEVEL_OUTOF10: 8
PAINLEVEL_OUTOF10: 3
PAINLEVEL_OUTOF10: 4
PAINLEVEL_OUTOF10: 3
PAINLEVEL_OUTOF10: 8
PAINLEVEL_OUTOF10: 6

## 2025-07-25 ASSESSMENT — PAIN DESCRIPTION - DESCRIPTORS
DESCRIPTORS: JABBING
DESCRIPTORS: ACHING

## 2025-07-25 ASSESSMENT — PAIN - FUNCTIONAL ASSESSMENT
PAIN_FUNCTIONAL_ASSESSMENT: PREVENTS OR INTERFERES WITH MANY ACTIVE NOT PASSIVE ACTIVITIES
PAIN_FUNCTIONAL_ASSESSMENT: 0-10
PAIN_FUNCTIONAL_ASSESSMENT: 0-10

## 2025-07-25 NOTE — CONSULTS
[facility] HPI.      Name: Jessica Mane   : 1970   MRN: [id number]   Date: 2025      Chief Complaint   Patient presents with    Shortness of Breath     Patient came in due to shortness of breath BIBEMS   Came from home oxygen at baseline at 4LPM   History of COPD and heart attack also complaining of left side flank pain        Reason for ICU:     Resp distress    HPI & Hospital course     Patient has PMH significant for metastatic breast cancer on anastrazole, COPD w chronic hypoxia on 5L/min NC, HFpEF, TERRA non-compliant with CPAP, chronic pain on methadone, Hx of PE on eliquis, decubitus ulcers due to bedridden status and chronic LE edema/ lymphedema.   She was last hospitalized from 25 to 3/20/25 for mixed hypoxic and hypercapnic RF needing BiPAP and ICU stay, had new left sided haziness in lung that was initially thought to be effusion but US did not show fluid. Was called left sided chronic atelactasis from mucous plugging, seen by PAR during admission. She was initially admitted in ICU for BiPAP needs and later transferred to floor. Patient completed course of Abx. TTE study was too limited due to poor quality. She was earlier planned to go home with hospice but later patent/ family decided to be full code. Patient was discharged on home trelegy with out patient follow up with oncology.     She came in ED today for worsened SOB, more oxygen needs at home and left sided flank pain.   CXR in ED with LLL haziness (also seen last time in February but was negative for fluid then), afebrile and WBC count of 14.1. Procal 0.05. initial /104, , on 5L nasal canula. ABG showed Ph 7.4/ PCO2 59 and bicarb 36.   ICU service was called for eval at request of hospitalist service.   At time of my evaluation, patient is awake and on 5L nasal canula. Sating > 92%. BP is > 120 and HR varying 100-120 with p waves. She is able to speak in full sentences and complains more about pain at

## 2025-07-25 NOTE — ED NOTES
ED TO INPATIENT SBAR HANDOFF    Patient Name: Jessica Mane   Preferred Name: Jessica  : 1970  55 y.o.   Family/Caregiver Present: no   Code Status Order: Full Code  PO Status: NPO:No  Telemetry Order:   C-SSRS: Risk of Suicide: No Risk  Sitter no    Restraints:   no  Sepsis Risk Score Sepsis V2 Risk Score: 30.4    Situation  Chief Complaint   Patient presents with    Shortness of Breath     Patient came in due to shortness of breath BIBEMS   Came from home oxygen at baseline at 4LPM   History of COPD and heart attack also complaining of left side flank pain      Brief Description of Patient's Condition: stated above   Mental Status: oriented  Arrived from:Home  Imaging:   XR CHEST PORTABLE   Final Result      Mild edema pattern. Hazy left basilar atelectasis/airspace disease. Moderate   left pleural effusion.            Electronically signed by CHAPARRITA WHEELER        Abnormal labs:   Abnormal Labs Reviewed   CBC WITH AUTO DIFFERENTIAL - Abnormal; Notable for the following components:       Result Value    WBC 14.1 (*)     Hematocrit 47.6 (*)     RDW 15.2 (*)     Neutrophils % 77.4 (*)     Lymphocytes % 9.6 (*)     Immature Granulocytes % 1.1 (*)     Neutrophils Absolute 10.88 (*)     Monocytes Absolute 1.54 (*)     Immature Granulocytes Absolute 0.15 (*)     All other components within normal limits   COMPREHENSIVE METABOLIC PANEL - Abnormal; Notable for the following components:    Glucose 186 (*)     BUN/Creatinine Ratio 28 (*)     Calcium 11.2 (*)     AST 14 (*)     Alk Phosphatase 527 (*)     Albumin 3.2 (*)     Globulin 4.1 (*)     Albumin/Globulin Ratio 0.8 (*)     All other components within normal limits   POCT GLUCOSE - Abnormal; Notable for the following components:    POC Glucose 162 (*)     All other components within normal limits   VENOUS BLOOD GAS, POINT OF CARE - Abnormal; Notable for the following components:    PCO2, [place], POC 58.9 (*)     HCO3, Venous 36.5 (*)     All other components

## 2025-07-25 NOTE — ED PROVIDER NOTES
Calcium 11.2 (H) 8.5 - 10.1 MG/DL    Total Bilirubin 0.8 0.2 - 1.0 MG/DL    ALT 29 12 - 78 U/L    AST 14 (L) 15 - 37 U/L    Alk Phosphatase 527 (H) 45 - 117 U/L    Total Protein 7.3 6.4 - 8.2 g/dL    Albumin 3.2 (L) 3.5 - 5.0 g/dL    Globulin 4.1 (H) 2.0 - 4.0 g/dL    Albumin/Globulin Ratio 0.8 (L) 1.1 - 2.2     Procalcitonin    Collection Time: 07/25/25 10:23 AM   Result Value Ref Range    Procalcitonin <0.05 ng/mL   Troponin    Collection Time: 07/25/25 10:23 AM   Result Value Ref Range    Troponin, High Sensitivity 49 0 - 51 ng/L   COVID-19 & Influenza Combo    Collection Time: 07/25/25 10:23 AM    Specimen: Nasopharyngeal   Result Value Ref Range    Source Nasopharyngeal      SARS-CoV-2, PCR Not detected NOTD      Rapid Influenza A By PCR Not detected NOTD      Rapid Influenza B By PCR Not detected NOTD     Brain Natriuretic Peptide    Collection Time: 07/25/25 10:23 AM   Result Value Ref Range    NT Pro- (H) <125 PG/ML   Magnesium    Collection Time: 07/25/25 10:23 AM   Result Value Ref Range    Magnesium 1.9 1.6 - 2.4 mg/dL   POCT Glucose    Collection Time: 07/25/25 10:25 AM   Result Value Ref Range    POC Glucose 162 (H) 65 - 117 mg/dL    Performed by: Tyler Rae RN    POC Lactic Acid    Collection Time: 07/25/25 10:27 AM   Result Value Ref Range    POC Lactic Acid 1.63 0.40 - 2.00 mmol/L   Venous Blood Gas, POC    Collection Time: 07/25/25 10:32 AM   Result Value Ref Range    PH, VENOUS (POC) 7.40 7.32 - 7.42      PCO2, [place], POC 58.9 (H) 41 - 51 MMHG    PO2, VENOUS (POC) 35 25 - 40 mmHg    HCO3, Venous 36.5 (H) 23.0 - 28.0 MMOL/L    SO2, VENOUS (POC) 65.2 65 - 88 %    BASE EXCESS, VENOUS (POC) 8.7 mmol/L    Specimen type: VENOUS BLOOD      Performed by: Tyler Butch RN    POCT Glucose    Collection Time: 07/25/25  5:48 PM   Result Value Ref Range    POC Glucose 246 (H) 65 - 117 mg/dL    Performed by: Adin Torres PCT    POCT Glucose    Collection Time: 07/25/25  8:43 PM   Result Value Ref  time range)   pantoprazole (PROTONIX) 40 mg in sodium chloride (PF) 0.9 % 10 mL injection (40 mg IntraVENous Given 7/25/25 1501)   albuterol (PROVENTIL) (2.5 MG/3ML) 0.083% nebulizer solution 2.5 mg (has no administration in time range)   albuterol sulfate HFA (PROVENTIL;VENTOLIN;PROAIR) 108 (90 Base) MCG/ACT inhaler 2 puff (has no administration in time range)   ammonium lactate (LAC-HYDRIN) 12 % lotion ( Topical Not Given 7/25/25 1719)   anastrozole (ARIMIDEX) tablet 1 mg (has no administration in time range)   apixaban (ELIQUIS) tablet 5 mg (5 mg Oral Given 7/25/25 1512)   aspirin chewable tablet 81 mg (81 mg Oral Given 7/25/25 1512)   fluticasone (FLONASE) 50 MCG/ACT nasal spray 2 spray (has no administration in time range)   budesonide (PULMICORT) nebulizer suspension 1,000 mcg (has no administration in time range)     And   arformoterol tartrate (BROVANA) nebulizer solution 15 mcg (has no administration in time range)   furosemide (LASIX) tablet 40 mg (40 mg Oral Given 7/25/25 1512)   HYDROmorphone (DILAUDID) tablet 4 mg (has no administration in time range)   insulin glargine (LANTUS) injection vial 40 Units (40 Units SubCUTAneous Given 7/25/25 1500)   lidocaine 4 % external patch 3 patch (3 patches TransDERmal Patch Applied 7/25/25 1507)   cetirizine (ZYRTEC) tablet 10 mg (10 mg Oral Given 7/25/25 1512)   atorvastatin (LIPITOR) tablet 10 mg (10 mg Oral Given 7/25/25 1512)   methadone (DOLOPHINE) tablet 5 mg (5 mg Oral Given 7/25/25 1501)   methocarbamol (ROBAXIN) tablet 750 mg (has no administration in time range)   metoprolol tartrate (LOPRESSOR) tablet 25 mg (25 mg Oral Given 7/25/25 1502)   pantoprazole (PROTONIX) tablet 40 mg (has no administration in time range)   predniSONE (DELTASONE) tablet 20 mg (20 mg Oral Given 7/25/25 1502)   sennosides-docusate sodium (SENOKOT-S) 8.6-50 MG tablet 2 tablet (2 tablets Oral Given 7/25/25 1502)   HYDROmorphone HCl PF (DILAUDID) injection 1 mg (has no administration

## 2025-07-25 NOTE — PROGRESS NOTES
Pharmacy Medication History Review    Medication history obtained by Frieda Kuo while patient was in room FT04/04 and was completed based on information available during current patient encounter. Medication history was completed after home meds were reconciled by provider.    Pharmacist Admission Medication Reconciliation Recommendations:            PTA medication list was corrected to the following:   Prior to Admission Medications   Prescriptions Last Dose Informant   Blood Glucose Monitoring Suppl (TRUE METRIX METER) HIPOLITO Past Week Self   Sig: Use as directed   Dextromethorphan-guaiFENesin  MG/5ML SYRP Past Week Self   Sig: Take 20 mLs by mouth 2 times daily as needed for Cough   HYDROmorphone (DILAUDID) 2 MG tablet Unknown Other, Self   Sig: Take 2 tablets by mouth every 4 hours as needed for Pain.   acetaminophen (TYLENOL) 500 MG tablet Unknown Self   Sig: Take 1 tablet by mouth in the morning and at bedtime 0300 and 1500   albuterol sulfate HFA (VENTOLIN HFA) 108 (90 Base) MCG/ACT inhaler 2025 Self   Sig: Inhale 2 puffs into the lungs 4 times daily as needed for Wheezing   ammonium lactate (LAC-HYDRIN) 12 % lotion Unknown Self   Sig: Apply topically as needed.   anastrozole (ARIMIDEX) 1 MG tablet 2025 Self   Sig: Take 1 tablet by mouth daily   apixaban (ELIQUIS) 5 MG TABS tablet 2025 Other, Self   Sig: Take 1 tablet by mouth 2 times daily   aspirin 81 MG chewable tablet 2025 Self, Other   Sig: Take 1 tablet by mouth daily   blood glucose monitor strips Past Week Self   Sig: Test 3 times a day & as needed for symptoms of irregular blood glucose. Dispense sufficient amount for indicated testing frequency plus additional to accommodate PRN testing needs.   blood glucose test strips (ASCENSIA AUTODISC VI;ONE TOUCH ULTRA TEST VI) strip Past Week Self   Si each by In Vitro route daily As needed.   docusate sodium (COLACE) 100 MG capsule 2025 Other, Self   Sig: Take 1 capsule

## 2025-07-25 NOTE — H&P
Hospitalist Admission Note    NAME:   Jessica Mane   : 1970   MRN: [id number]     Date/Time: 2025 1:41 PM    Patient PCP: No primary care provider on file.    ______________________________________________________________________  Given the patient's current clinical presentation, I have a high level of concern for decompensation if discharged from the emergency department.  Complex decision making was performed, which includes reviewing the patient's available past medical records, laboratory results, and x-ray films.       My assessment of this patient's clinical condition and my plan of care is as follows.    Assessment / Plan:    Acute hypoxia respiratory failure likely related hypo ventilatory obesity syndrome   COPD exacerbation   Asthma exacerbation   Leukocytosis   Acute on chronic pain   Chronic pain syndrome   Debilitated       Presents to the ED with complaints of SOB oxygenation 92% with exacerbated pain that started approx one week ago, notes symptoms worsened therefore activated EMS. Pt was on home hosp[facility].    CXR - Mild edema pattern. Hazy left basilar atelectasis/airspace disease. Moderate left pleural effusion   -lasix 40 mg IV in ED  -oxygen protocol  -magnesium 2gm x 1  -Covid Influenza A/B negative   -procal <0.05  -pending blood cx  -obtain urine cx / UDS   -hematology WBC 14- empiric Rocephin   -prn Fentanyl given in ED- continue home dose  hydromorphone  -trend cbc,bmp daily   -pt at Baseline mobility - bedridden -has home support   -resume home nebs, resume prednisone, allow for facility substitutions  -consult pulmonary   -may need bipap - ordered  -obtain CT chest  A/P  -obtain CTA chest       Pulmonary Embolism   -resume apixaban    Chronic  pain syndrome  Chronic opioid dependence due cancer related pain   -resume lidocaine patch (x 3)   -resume hydromorphone  -resume PTA metadone     Hx HTN  HX CAD  Hx of congestive heart failure (HFpEF)  -vitals per routine     CAD (coronary artery disease)     Cancer (HCC)     breast cancer    Congestive heart failure (HCC)     COPD (chronic obstructive pulmonary disease) (HCC)     Diabetes (HCC)     Hypertension     Morbid obesity with BMI of 70 and over, adult (HCC)     NSTEMI (non-ST elevated myocardial infarction) (HCC) 2012    NSTEMI (non-ST elevated myocardial infarction) ([facility])     SVT (supraventricular tachycardia)         Past Surgical History:   Procedure Laterality Date     SECTION      ORTHOPEDIC SURGERY      OTHER SURGICAL HISTORY      cyst removed from back    [facility] UNLISTED PROCEDURE CARDIAC SURGERY      Stents    US BREAST BIOPSY W LOC DEVICE 1ST LESION RIGHT Right 2018    US BREAST NEEDLE BIOPSY RIGHT 2018 [facility] RAD US       Social History     Tobacco Use    Smoking status: Former     Current packs/day: 0.25     Types: Cigarettes    Smokeless tobacco: Never    Tobacco comments:     Quit smoking: Patient states \"I  aint smoking no more d*mn cigarettes after yesterday\" 2018   Substance Use Topics    Alcohol use: No        Family History   Problem Relation Age of Onset    Heart Disease Mother     Heart Disease Father     Heart Disease Sister     Breast Cancer Maternal Grandmother     Breast Cancer Paternal Grandmother        No Known Allergies     Prior to Admission medications    Medication Sig Start Date End Date Taking? Authorizing Provider   doxycycline hyclate (VIBRA-TABS) 100 MG tablet Take 1 tablet by mouth 2 times daily for 10 days 25  Irene Ceballos MD   predniSONE (DELTASONE) 50 MG tablet Take 1 tablet by mouth daily for 5 days 25  Irene Ceballos MD   ondansetron (ZOFRAN-ODT) 4 MG disintegrating tablet Take 1 tablet by mouth every 8 hours as needed for Nausea or Vomiting 3/1/25   Soraida John MD   albuterol sulfate HFA (VENTOLIN HFA) 108 (90 Base) MCG/ACT inhaler Inhale 2 puffs into the lungs 4 times daily as needed for Wheezing 2/10/25   Stacy Borges

## 2025-07-25 NOTE — ED NOTES
ED TO INPATIENT SBAR HANDOFF    Patient Name: Jessica Mane   Preferred Name: Jessica  : 1970  55 y.o.   Family/Caregiver Present: no   Code Status Order: Full Code  PO Status: NPO:No  Telemetry Order: No  C-SSRS: Risk of Suicide: No Risk  Sitter no   Restraints:     Sepsis Risk Score Sepsis V2 Risk Score: 17.4    Situation  Chief Complaint   Patient presents with    Shortness of Breath     Patient came in due to shortness of breath BIBEMS   Came from home oxygen at baseline at 4LPM   History of COPD and heart attack also complaining of left side flank pain      Brief Description of Patient's Condition: STATED AS ABOVE   Mental Status: oriented  Arrived from:Home  Imaging:   XR CHEST PORTABLE   Final Result      Mild edema pattern. Hazy left basilar atelectasis/airspace disease. Moderate   left pleural effusion.            Electronically signed by CHAPARRITA WHEELER      CT CHEST W CONTRAST    (Results Pending)     Abnormal labs:   Abnormal Labs Reviewed   CBC WITH AUTO DIFFERENTIAL - Abnormal; Notable for the following components:       Result Value    WBC 14.1 (*)     Hematocrit 47.6 (*)     RDW 15.2 (*)     Neutrophils % 77.4 (*)     Lymphocytes % 9.6 (*)     Immature Granulocytes % 1.1 (*)     Neutrophils Absolute 10.88 (*)     Monocytes Absolute 1.54 (*)     Immature Granulocytes Absolute 0.15 (*)     All other components within normal limits   COMPREHENSIVE METABOLIC PANEL - Abnormal; Notable for the following components:    Glucose 186 (*)     BUN/Creatinine Ratio 28 (*)     Calcium 11.2 (*)     AST 14 (*)     Alk Phosphatase 527 (*)     Albumin 3.2 (*)     Globulin 4.1 (*)     Albumin/Globulin Ratio 0.8 (*)     All other components within normal limits   BRAIN NATRIURETIC PEPTIDE - Abnormal; Notable for the following components:    NT Pro- (*)     All other components within normal limits   POCT GLUCOSE - Abnormal; Notable for the following components:    POC Glucose 162 (*)     All other  components within normal limits   VENOUS BLOOD GAS, POINT OF CARE - Abnormal; Notable for the following components:    PCO2, [place], POC 58.9 (*)     HCO3, Venous 36.5 (*)     All other components within normal limits       Background  Allergies: No Known Allergies  History:   Past Medical History:   Diagnosis Date    Arthritis     Asthma     Breast lump     CAD (coronary artery disease)     Cancer (HCC)     breast cancer    Congestive heart failure (HCC)     COPD (chronic obstructive pulmonary disease) ([facility])     Diabetes ([facility])     Hypertension     Morbid obesity with BMI of 70 and over, adult ([facility])     NSTEMI (non-ST elevated myocardial infarction) ([facility]) 8/18/2012    NSTEMI (non-ST elevated myocardial infarction) ([facility])     SVT (supraventricular tachycardia)        Assessment  Vitals: MEWS Score: 4  Level of Consciousness: Alert (0)   Vitals:    07/25/25 1230 07/25/25 1300 07/25/25 1456 07/25/25 1501   BP: 131/87 134/77  (!) 159/73   Pulse: (!) 117 (!) 112 (!) 120 (!) 119   Resp: 19 15 16    Temp:       TempSrc:       SpO2:   93%    Weight:       Height:         Deterioration Index (DI): Deterioration Index: 39.06  Deterioration Index (DI) Interventions Performed:    O2 Flow Rate: O2 Flow Rate (L/min): 5 L/min  O2 Device: O2 Device: Nasal cannula  Cardiac Rhythm: Cardiac Rhythm: Sinus tachycardia  Critical Lab Results: [unfilled]  Cultures: Cultures:Blood  NIH Score: NIH     Active LDA's:   Peripheral IV 07/25/25 Right Antecubital (Active)   Site Assessment Clean, dry & intact 07/25/25 1037   Line Status Brisk blood return;Flushed;Normal saline locked;Specimen collected 07/25/25 1037   Line Care Connections checked and tightened;Line pulled back 07/25/25 1037   Phlebitis Assessment No symptoms 07/25/25 1037   Infiltration Assessment 0 07/25/25 1037   Alcohol Cap Used Yes 07/25/25 1037   Dressing Status New dressing applied;Clean, dry & intact 07/25/25 1037   Dressing Type Transparent 07/25/25 1037   Dressing

## 2025-07-25 NOTE — PROGRESS NOTES
Patient medications received from security from ED med lock box. Medications given to patient's sister Amada Mane (verified with license) in a sealed bag numbered [id number]. Patient consented to allowing sister to take medications home. Exchange of medication witnessed by Brianne BAZAN RN

## 2025-07-26 LAB
25(OH)D3 SERPL-MCNC: 18 NG/ML (ref 30–100)
ANION GAP SERPL CALC-SCNC: 5 MMOL/L (ref 2–12)
BASOPHILS # BLD: 0.1 K/UL (ref 0–0.1)
BASOPHILS NFR BLD: 0.8 % (ref 0–1)
BUN SERPL-MCNC: 22 MG/DL (ref 6–20)
BUN/CREAT SERPL: 28 (ref 12–20)
CALCIUM SERPL-MCNC: 10.9 MG/DL (ref 8.5–10.1)
CHLORIDE SERPL-SCNC: 100 MMOL/L (ref 97–108)
CO2 SERPL-SCNC: 34 MMOL/L (ref 21–32)
CREAT SERPL-MCNC: 0.78 MG/DL (ref 0.55–1.02)
DIFFERENTIAL METHOD BLD: ABNORMAL
EOSINOPHIL # BLD: 0.09 K/UL (ref 0–0.4)
EOSINOPHIL NFR BLD: 0.7 % (ref 0–7)
ERYTHROCYTE [DISTWIDTH] IN BLOOD BY AUTOMATED COUNT: 15.2 % (ref 11.5–14.5)
EST. AVERAGE GLUCOSE BLD GHB EST-MCNC: 143 MG/DL
GLUCOSE BLD STRIP.AUTO-MCNC: 121 MG/DL (ref 65–117)
GLUCOSE BLD STRIP.AUTO-MCNC: 183 MG/DL (ref 65–117)
GLUCOSE BLD STRIP.AUTO-MCNC: 237 MG/DL (ref 65–117)
GLUCOSE BLD STRIP.AUTO-MCNC: 277 MG/DL (ref 65–117)
GLUCOSE SERPL-MCNC: 119 MG/DL (ref 65–100)
HBA1C MFR BLD: 6.6 % (ref 4–5.6)
HCT VFR BLD AUTO: 51 % (ref 35–47)
HGB BLD-MCNC: 14.5 G/DL (ref 11.5–16)
IMM GRANULOCYTES # BLD AUTO: 0.16 K/UL (ref 0–0.04)
IMM GRANULOCYTES NFR BLD AUTO: 1.3 % (ref 0–0.5)
LYMPHOCYTES # BLD: 0.97 K/UL (ref 0.8–3.5)
LYMPHOCYTES NFR BLD: 7.7 % (ref 12–49)
MCH RBC QN AUTO: 27.8 PG (ref 26–34)
MCHC RBC AUTO-ENTMCNC: 28.4 G/DL (ref 30–36.5)
MCV RBC AUTO: 97.7 FL (ref 80–99)
MONOCYTES # BLD: 1.42 K/UL (ref 0–1)
MONOCYTES NFR BLD: 11.3 % (ref 5–13)
NEUTS SEG # BLD: 9.86 K/UL (ref 1.8–8)
NEUTS SEG NFR BLD: 78.2 % (ref 32–75)
NRBC # BLD: 0 K/UL (ref 0–0.01)
NRBC BLD-RTO: 0 PER 100 WBC
PLATELET # BLD AUTO: 150 K/UL (ref 150–400)
PMV BLD AUTO: 10.3 FL (ref 8.9–12.9)
POTASSIUM SERPL-SCNC: 4.4 MMOL/L (ref 3.5–5.1)
RBC # BLD AUTO: 5.22 M/UL (ref 3.8–5.2)
RBC MORPH BLD: ABNORMAL
SERVICE CMNT-IMP: ABNORMAL
SODIUM SERPL-SCNC: 139 MMOL/L (ref 136–145)
T4 FREE SERPL-MCNC: 1.3 NG/DL (ref 0.8–1.5)
TSH SERPL DL<=0.05 MIU/L-ACNC: 0.78 UIU/ML (ref 0.36–3.74)
VIT B12 SERPL-MCNC: 583 PG/ML (ref 193–986)
WBC # BLD AUTO: 12.6 K/UL (ref 3.6–11)

## 2025-07-26 PROCEDURE — 6370000000 HC RX 637 (ALT 250 FOR IP): Performed by: HOSPITALIST

## 2025-07-26 PROCEDURE — 6360000002 HC RX W HCPCS: Performed by: NURSE PRACTITIONER

## 2025-07-26 PROCEDURE — 2700000000 HC OXYGEN THERAPY PER DAY

## 2025-07-26 PROCEDURE — 1100000000 HC RM PRIVATE

## 2025-07-26 PROCEDURE — 36415 COLL VENOUS BLD VENIPUNCTURE: CPT

## 2025-07-26 PROCEDURE — 82962 GLUCOSE BLOOD TEST: CPT

## 2025-07-26 PROCEDURE — 2500000003 HC RX 250 WO HCPCS: Performed by: NURSE PRACTITIONER

## 2025-07-26 PROCEDURE — 87040 BLOOD CULTURE FOR BACTERIA: CPT

## 2025-07-26 PROCEDURE — 82306 VITAMIN D 25 HYDROXY: CPT

## 2025-07-26 PROCEDURE — 6370000000 HC RX 637 (ALT 250 FOR IP): Performed by: PHYSICIAN ASSISTANT

## 2025-07-26 PROCEDURE — 82607 VITAMIN B-12: CPT

## 2025-07-26 PROCEDURE — 94640 AIRWAY INHALATION TREATMENT: CPT

## 2025-07-26 PROCEDURE — 80048 BASIC METABOLIC PNL TOTAL CA: CPT

## 2025-07-26 PROCEDURE — 6370000000 HC RX 637 (ALT 250 FOR IP): Performed by: NURSE PRACTITIONER

## 2025-07-26 PROCEDURE — 85025 COMPLETE CBC W/AUTO DIFF WBC: CPT

## 2025-07-26 PROCEDURE — 84443 ASSAY THYROID STIM HORMONE: CPT

## 2025-07-26 PROCEDURE — 84439 ASSAY OF FREE THYROXINE: CPT

## 2025-07-26 PROCEDURE — 83036 HEMOGLOBIN GLYCOSYLATED A1C: CPT

## 2025-07-26 RX ORDER — IPRATROPIUM BROMIDE AND ALBUTEROL SULFATE 2.5; .5 MG/3ML; MG/3ML
1 SOLUTION RESPIRATORY (INHALATION)
Status: DISCONTINUED | OUTPATIENT
Start: 2025-07-26 | End: 2025-08-01 | Stop reason: HOSPADM

## 2025-07-26 RX ORDER — DOXYCYCLINE HYCLATE 100 MG
100 TABLET ORAL EVERY 12 HOURS SCHEDULED
Status: COMPLETED | OUTPATIENT
Start: 2025-07-26 | End: 2025-07-31

## 2025-07-26 RX ORDER — BENZONATATE 100 MG/1
100 CAPSULE ORAL 3 TIMES DAILY PRN
Status: DISCONTINUED | OUTPATIENT
Start: 2025-07-26 | End: 2025-08-01 | Stop reason: HOSPADM

## 2025-07-26 RX ADMIN — SENNOSIDES AND DOCUSATE SODIUM 2 TABLET: 8.6; 5 TABLET ORAL at 12:28

## 2025-07-26 RX ADMIN — INSULIN GLARGINE 40 UNITS: 100 INJECTION, SOLUTION SUBCUTANEOUS at 12:30

## 2025-07-26 RX ADMIN — INSULIN LISPRO 2 UNITS: 100 INJECTION, SOLUTION INTRAVENOUS; SUBCUTANEOUS at 22:08

## 2025-07-26 RX ADMIN — ANASTROZOLE 1 MG: 1 TABLET, COATED ORAL at 12:30

## 2025-07-26 RX ADMIN — ASPIRIN 81 MG: 81 TABLET, CHEWABLE ORAL at 12:28

## 2025-07-26 RX ADMIN — SODIUM CHLORIDE, PRESERVATIVE FREE 10 ML: 5 INJECTION INTRAVENOUS at 20:41

## 2025-07-26 RX ADMIN — SODIUM CHLORIDE, PRESERVATIVE FREE 10 ML: 5 INJECTION INTRAVENOUS at 12:33

## 2025-07-26 RX ADMIN — CETIRIZINE HYDROCHLORIDE 10 MG: 10 TABLET, FILM COATED ORAL at 12:31

## 2025-07-26 RX ADMIN — APIXABAN 5 MG: 5 TABLET, FILM COATED ORAL at 12:31

## 2025-07-26 RX ADMIN — WATER 2000 MG: 1 INJECTION INTRAMUSCULAR; INTRAVENOUS; SUBCUTANEOUS at 14:44

## 2025-07-26 RX ADMIN — FUROSEMIDE 40 MG: 40 TABLET ORAL at 12:28

## 2025-07-26 RX ADMIN — INSULIN LISPRO 2 UNITS: 100 INJECTION, SOLUTION INTRAVENOUS; SUBCUTANEOUS at 14:35

## 2025-07-26 RX ADMIN — METHADONE HYDROCHLORIDE 5 MG: 10 TABLET ORAL at 12:31

## 2025-07-26 RX ADMIN — PREDNISONE 20 MG: 20 TABLET ORAL at 12:31

## 2025-07-26 RX ADMIN — IPRATROPIUM BROMIDE AND ALBUTEROL SULFATE 1 DOSE: .5; 3 SOLUTION RESPIRATORY (INHALATION) at 09:10

## 2025-07-26 RX ADMIN — PANTOPRAZOLE SODIUM 40 MG: 40 TABLET, DELAYED RELEASE ORAL at 06:17

## 2025-07-26 RX ADMIN — METOPROLOL TARTRATE 25 MG: 50 TABLET, FILM COATED ORAL at 12:31

## 2025-07-26 RX ADMIN — Medication: at 14:48

## 2025-07-26 RX ADMIN — BUDESONIDE 1000 MCG: 0.5 INHALANT RESPIRATORY (INHALATION) at 09:15

## 2025-07-26 RX ADMIN — ARFORMOTEROL TARTRATE 15 MCG: 15 SOLUTION RESPIRATORY (INHALATION) at 20:40

## 2025-07-26 RX ADMIN — ARFORMOTEROL TARTRATE 15 MCG: 15 SOLUTION RESPIRATORY (INHALATION) at 09:15

## 2025-07-26 RX ADMIN — MELATONIN 6 MG: at 20:36

## 2025-07-26 RX ADMIN — FUROSEMIDE 40 MG: 40 TABLET ORAL at 20:36

## 2025-07-26 RX ADMIN — IPRATROPIUM BROMIDE AND ALBUTEROL SULFATE 1 DOSE: .5; 3 SOLUTION RESPIRATORY (INHALATION) at 20:35

## 2025-07-26 RX ADMIN — FLUTICASONE PROPIONATE 2 SPRAY: 50 SPRAY, METERED NASAL at 14:47

## 2025-07-26 RX ADMIN — PANTOPRAZOLE SODIUM 40 MG: 40 INJECTION, POWDER, FOR SOLUTION INTRAVENOUS at 12:31

## 2025-07-26 RX ADMIN — INSULIN LISPRO 4 UNITS: 100 INJECTION, SOLUTION INTRAVENOUS; SUBCUTANEOUS at 18:03

## 2025-07-26 RX ADMIN — METHADONE HYDROCHLORIDE 5 MG: 10 TABLET ORAL at 20:36

## 2025-07-26 RX ADMIN — BUDESONIDE 1000 MCG: 0.5 INHALANT RESPIRATORY (INHALATION) at 20:40

## 2025-07-26 RX ADMIN — IPRATROPIUM BROMIDE AND ALBUTEROL SULFATE 1 DOSE: .5; 3 SOLUTION RESPIRATORY (INHALATION) at 17:11

## 2025-07-26 RX ADMIN — ATORVASTATIN CALCIUM 10 MG: 10 TABLET, FILM COATED ORAL at 12:31

## 2025-07-26 RX ADMIN — APIXABAN 5 MG: 5 TABLET, FILM COATED ORAL at 20:36

## 2025-07-26 RX ADMIN — DOXYCYCLINE HYCLATE 100 MG: 100 TABLET, FILM COATED ORAL at 20:36

## 2025-07-26 RX ADMIN — METOPROLOL TARTRATE 25 MG: 50 TABLET, FILM COATED ORAL at 20:36

## 2025-07-26 ASSESSMENT — PAIN DESCRIPTION - LOCATION: LOCATION: BACK

## 2025-07-26 ASSESSMENT — PAIN SCALES - GENERAL: PAINLEVEL_OUTOF10: 5

## 2025-07-26 NOTE — PROGRESS NOTES
Hospitalist Progress Note     Demographics    Patient Name  Jessica Mane   Date of Birth 1970   Medical Record Number  [id number]      Age  55 y.o.   PCP No primary care provider on file.   Admit date:  7/25/2025  9:57 AM     Room Number  3235/01  @ [facility]           Admission Diagnoses:  COPD exacerbation (HCC)          Assessment and plan:   Acute on chronic hypoxic respiratory failure   COPD exacerbation   Asthma exacerbation   Leukocytosis   Acute on chronic pain   Chronic pain syndrome   Debilitated      Still having significant dyspnea without much wheezing today.  Also noted to have intermittent cough.  Afebrile  Appreciate pulmonary input  Continue supportive care including nebs, supplemental oxygen, cough medicine  Continue current pain regimen as below        Pulmonary Embolism   - Continue apixaban     Chronic  pain syndrome  Chronic opioid dependence due cancer related pain   Continue lidocaine patch, hydromorphone, methadone     Hx HTN  HX CAD  Hx of congestive heart failure (HFpEF)  - Continue home medications     Insulin Dependent Diabetes  SSI     Hx Breast CA stage IV  -widely metastatic - no recent history of seeing her oncologist   - In the process of enrolling with hospice     Super morbid obesity   Likely with obesity hypoventilation       Body mass index is 58.65 kg/m².  Reference: BMI greater than 30 is classified as obesity and greater than 40 is classified as morbid obesity.          Medical Decision Making:    Relevant labs were reviewed:  Yes    Imaging report reviewed  Yes    EKG reviewed  NA   High risk/toxic drug monitoring  Yes narcotics    Care plan discussed with  Patient/Family  and Nurse      Subjective and Objective Data    Patient was seen and examined at the bedside in the presence of her son.  Still having shortness of breath and cough without significant wheezing today.  Remains afebrile.    Comments       Patient Vitals for the past 24

## 2025-07-26 NOTE — CONSULTS
Pulmonary, Critical Care, and Sleep Medicine~Consult Note    Name: Jessica Mane MRN: [id number]   : 1970 Hospital: [facility]   Date: 2025 11:53 AM Admission: 2025     Impression Plan   Chronic hypoxemic/hypercapnic respiratory failure  COPD +/- acute exacerbation--no wheezing on exam today  HFpEF  H/o PE on Eliquis  metastatic breast cancer s/p XRT  HTN  CAD  DM  Chronic pain on methadone  TERRA intolerant of CPAP  debility  Morbid obesity Supp O2 for goal sats >88%--on 4L  Continue scheduled nebs  Agree w/ empiric abx--ctx. Add doxy for COPD exacerbation  She is declining CT chest as she is unable to lie flat due to pain  PO lasix  Prednisone burse  F/u Bcx  Home methadone  Hospice consult pending  Eliquis      Thank you for the consult, will see again Monday     Daily Progression:    Consult for respiratory failure    HPI: 54 y/o F with PMH COPD, chronic hypoxemic/hypercapnic respiratory failure on 4-5L O2, TERRA intolerant of CPAP, HFpEF, CAD, HTN, DM, chronic pain on methadone, metastatic breast cancer s/p XRT and hormonal therapy, prior PE on eliquis, decubitus ulcers due to bedridden status who presented c/o shortness of breath and left flank pain.  Of note, she was discharged home with hospice after admission 3/2025.    Labs: wbc 14.1, procal low, bnp 406, bicarb 34    CXR 25: Mild edema pattern. Hazy left basilar atelectasis/airspace disease. Moderate  left pleural effusion    Chest CT 25:  1. Subtotal atelectasis of the left lower lobe and lingula. Some of this is  positional and some of it is chronic.  2. Debris in the distal left mainstem and left upper lobe bronchi.  3. Diffuse sclerotic osseous metastases, likely from multicentric right breast  carcinoma.  4. Ascending thoracic aortic aneurysm (41 mm).  5. Moderate to severe LAD and LCx coronary calcification.  6. Diminutive/slitlike SVC. This may be due to dehydration, though  tablet 40 mg  40 mg Oral BID    HYDROmorphone (DILAUDID) tablet 4 mg  4 mg Oral Q4H PRN    insulin glargine (LANTUS) injection vial 40 Units  40 Units SubCUTAneous Daily    lidocaine 4 % external patch 3 patch  3 patch TransDERmal Q24H    cetirizine (ZYRTEC) tablet 10 mg  10 mg Oral Daily    atorvastatin (LIPITOR) tablet 10 mg  10 mg Oral Daily    methadone (DOLOPHINE) tablet 5 mg  5 mg Oral BID    methocarbamol (ROBAXIN) tablet 750 mg  750 mg Oral TID PRN    metoprolol tartrate (LOPRESSOR) tablet 25 mg  25 mg Oral BID    pantoprazole (PROTONIX) tablet 40 mg  40 mg Oral QAM AC    predniSONE (DELTASONE) tablet 20 mg  20 mg Oral Daily    sennosides-docusate sodium (SENOKOT-S) 8.6-50 MG tablet 2 tablet  2 tablet Oral Daily    HYDROmorphone HCl PF (DILAUDID) injection 1 mg  1 mg IntraVENous Once PRN    cefTRIAXone (ROCEPHIN) 2,000 mg in sterile water 20 mL IV syringe  2,000 mg IntraVENous Q24H    glucose chewable tablet 16 g  4 tablet Oral PRN    dextrose bolus 10% 125 mL  125 mL IntraVENous PRN    Or    dextrose bolus 10% 250 mL  250 mL IntraVENous PRN    glucagon injection 1 mg  1 mg SubCUTAneous PRN    dextrose 10 % infusion   IntraVENous Continuous PRN    insulin lispro (HUMALOG,ADMELOG) injection vial 0-8 Units  0-8 Units SubCUTAneous 4x Daily AC & HS    hydrALAZINE (APRESOLINE) injection 10 mg  10 mg IntraVENous Q6H PRN    iopamidol (ISOVUE-370) 76 % injection 100 mL  100 mL IntraVENous ONCE PRN       Labs:  ABG Invalid input(s): \"PHI\", \"PCO2I\", \"PO2I\", \"HCO3I\", \"SO2I\", \"FIO2I\"     CBC Recent Labs     07/25/25  1023 07/26/25  0741   WBC 14.1* 12.6*   HGB 14.4 14.5   HCT 47.6* 51.0*    150   MCV 92.6 97.7   MCH 28.0 27.8        Metabolic  Panel Recent Labs     07/25/25  1023 07/26/25  0741    139   K 3.5 4.4    100   CO2 31 34*   BUN 20 22*   MG 1.9  --    ALT 29  --         Pertinent Labs                LINAD Freeman  7/26/2025

## 2025-07-27 LAB
ANION GAP SERPL CALC-SCNC: 4 MMOL/L (ref 2–12)
BUN SERPL-MCNC: 24 MG/DL (ref 6–20)
BUN/CREAT SERPL: 27 (ref 12–20)
CALCIUM SERPL-MCNC: 10.7 MG/DL (ref 8.5–10.1)
CHLORIDE SERPL-SCNC: 98 MMOL/L (ref 97–108)
CO2 SERPL-SCNC: 34 MMOL/L (ref 21–32)
CREAT SERPL-MCNC: 0.89 MG/DL (ref 0.55–1.02)
GLUCOSE BLD STRIP.AUTO-MCNC: 125 MG/DL (ref 65–117)
GLUCOSE BLD STRIP.AUTO-MCNC: 210 MG/DL (ref 65–117)
GLUCOSE BLD STRIP.AUTO-MCNC: 231 MG/DL (ref 65–117)
GLUCOSE BLD STRIP.AUTO-MCNC: 261 MG/DL (ref 65–117)
GLUCOSE SERPL-MCNC: 119 MG/DL (ref 65–100)
POTASSIUM SERPL-SCNC: 4.1 MMOL/L (ref 3.5–5.1)
SERVICE CMNT-IMP: ABNORMAL
SODIUM SERPL-SCNC: 136 MMOL/L (ref 136–145)

## 2025-07-27 PROCEDURE — 6370000000 HC RX 637 (ALT 250 FOR IP): Performed by: HOSPITALIST

## 2025-07-27 PROCEDURE — 6360000002 HC RX W HCPCS: Performed by: NURSE PRACTITIONER

## 2025-07-27 PROCEDURE — 2700000000 HC OXYGEN THERAPY PER DAY

## 2025-07-27 PROCEDURE — 94660 CPAP INITIATION&MGMT: CPT

## 2025-07-27 PROCEDURE — 80048 BASIC METABOLIC PNL TOTAL CA: CPT

## 2025-07-27 PROCEDURE — 6370000000 HC RX 637 (ALT 250 FOR IP): Performed by: NURSE PRACTITIONER

## 2025-07-27 PROCEDURE — 6370000000 HC RX 637 (ALT 250 FOR IP): Performed by: PHYSICIAN ASSISTANT

## 2025-07-27 PROCEDURE — 94640 AIRWAY INHALATION TREATMENT: CPT

## 2025-07-27 PROCEDURE — 82962 GLUCOSE BLOOD TEST: CPT

## 2025-07-27 PROCEDURE — 2500000003 HC RX 250 WO HCPCS: Performed by: NURSE PRACTITIONER

## 2025-07-27 PROCEDURE — 1100000000 HC RM PRIVATE

## 2025-07-27 PROCEDURE — 36415 COLL VENOUS BLD VENIPUNCTURE: CPT

## 2025-07-27 RX ORDER — GUAIFENESIN 200 MG/10ML
200 LIQUID ORAL EVERY 4 HOURS PRN
Status: DISCONTINUED | OUTPATIENT
Start: 2025-07-27 | End: 2025-08-01 | Stop reason: HOSPADM

## 2025-07-27 RX ORDER — POLYETHYLENE GLYCOL 3350 17 G/17G
17 POWDER, FOR SOLUTION ORAL DAILY
Status: DISCONTINUED | OUTPATIENT
Start: 2025-07-27 | End: 2025-08-01 | Stop reason: HOSPADM

## 2025-07-27 RX ORDER — BISACODYL 5 MG/1
5 TABLET, DELAYED RELEASE ORAL ONCE
Status: DISCONTINUED | OUTPATIENT
Start: 2025-07-27 | End: 2025-08-01 | Stop reason: HOSPADM

## 2025-07-27 RX ADMIN — BUDESONIDE 1000 MCG: 0.5 INHALANT RESPIRATORY (INHALATION) at 20:15

## 2025-07-27 RX ADMIN — POLYETHYLENE GLYCOL 3350 17 G: 17 POWDER, FOR SOLUTION ORAL at 11:04

## 2025-07-27 RX ADMIN — CETIRIZINE HYDROCHLORIDE 10 MG: 10 TABLET, FILM COATED ORAL at 09:57

## 2025-07-27 RX ADMIN — APIXABAN 5 MG: 5 TABLET, FILM COATED ORAL at 20:51

## 2025-07-27 RX ADMIN — PANTOPRAZOLE SODIUM 40 MG: 40 INJECTION, POWDER, FOR SOLUTION INTRAVENOUS at 09:54

## 2025-07-27 RX ADMIN — SENNOSIDES AND DOCUSATE SODIUM 2 TABLET: 8.6; 5 TABLET ORAL at 09:55

## 2025-07-27 RX ADMIN — MELATONIN 6 MG: at 20:52

## 2025-07-27 RX ADMIN — IPRATROPIUM BROMIDE AND ALBUTEROL SULFATE 1 DOSE: .5; 3 SOLUTION RESPIRATORY (INHALATION) at 10:19

## 2025-07-27 RX ADMIN — FUROSEMIDE 40 MG: 40 TABLET ORAL at 09:55

## 2025-07-27 RX ADMIN — INSULIN LISPRO 4 UNITS: 100 INJECTION, SOLUTION INTRAVENOUS; SUBCUTANEOUS at 18:25

## 2025-07-27 RX ADMIN — INSULIN GLARGINE 40 UNITS: 100 INJECTION, SOLUTION SUBCUTANEOUS at 09:54

## 2025-07-27 RX ADMIN — FUROSEMIDE 40 MG: 40 TABLET ORAL at 20:52

## 2025-07-27 RX ADMIN — IPRATROPIUM BROMIDE AND ALBUTEROL SULFATE 1 DOSE: .5; 3 SOLUTION RESPIRATORY (INHALATION) at 15:10

## 2025-07-27 RX ADMIN — ASPIRIN 81 MG: 81 TABLET, CHEWABLE ORAL at 09:55

## 2025-07-27 RX ADMIN — METHADONE HYDROCHLORIDE 5 MG: 10 TABLET ORAL at 09:57

## 2025-07-27 RX ADMIN — IPRATROPIUM BROMIDE AND ALBUTEROL SULFATE 1 DOSE: .5; 3 SOLUTION RESPIRATORY (INHALATION) at 20:10

## 2025-07-27 RX ADMIN — METOPROLOL TARTRATE 25 MG: 50 TABLET, FILM COATED ORAL at 09:57

## 2025-07-27 RX ADMIN — ACETAMINOPHEN 650 MG: 325 TABLET ORAL at 02:54

## 2025-07-27 RX ADMIN — DOXYCYCLINE HYCLATE 100 MG: 100 TABLET, FILM COATED ORAL at 09:55

## 2025-07-27 RX ADMIN — HYDROMORPHONE HYDROCHLORIDE 4 MG: 2 TABLET ORAL at 06:37

## 2025-07-27 RX ADMIN — PANTOPRAZOLE SODIUM 40 MG: 40 TABLET, DELAYED RELEASE ORAL at 06:22

## 2025-07-27 RX ADMIN — SODIUM CHLORIDE, PRESERVATIVE FREE 10 ML: 5 INJECTION INTRAVENOUS at 09:57

## 2025-07-27 RX ADMIN — METOPROLOL TARTRATE 25 MG: 50 TABLET, FILM COATED ORAL at 20:51

## 2025-07-27 RX ADMIN — ARFORMOTEROL TARTRATE 15 MCG: 15 SOLUTION RESPIRATORY (INHALATION) at 20:15

## 2025-07-27 RX ADMIN — PREDNISONE 20 MG: 20 TABLET ORAL at 09:57

## 2025-07-27 RX ADMIN — INSULIN LISPRO 2 UNITS: 100 INJECTION, SOLUTION INTRAVENOUS; SUBCUTANEOUS at 13:39

## 2025-07-27 RX ADMIN — ARFORMOTEROL TARTRATE 15 MCG: 15 SOLUTION RESPIRATORY (INHALATION) at 10:24

## 2025-07-27 RX ADMIN — BUDESONIDE 1000 MCG: 0.5 INHALANT RESPIRATORY (INHALATION) at 10:24

## 2025-07-27 RX ADMIN — SODIUM CHLORIDE, PRESERVATIVE FREE 10 ML: 5 INJECTION INTRAVENOUS at 20:59

## 2025-07-27 RX ADMIN — APIXABAN 5 MG: 5 TABLET, FILM COATED ORAL at 09:54

## 2025-07-27 RX ADMIN — ANASTROZOLE 1 MG: 1 TABLET, COATED ORAL at 09:54

## 2025-07-27 RX ADMIN — DOXYCYCLINE HYCLATE 100 MG: 100 TABLET, FILM COATED ORAL at 20:51

## 2025-07-27 RX ADMIN — WATER 2000 MG: 1 INJECTION INTRAMUSCULAR; INTRAVENOUS; SUBCUTANEOUS at 15:23

## 2025-07-27 RX ADMIN — METHADONE HYDROCHLORIDE 5 MG: 10 TABLET ORAL at 20:50

## 2025-07-27 RX ADMIN — INSULIN LISPRO 2 UNITS: 100 INJECTION, SOLUTION INTRAVENOUS; SUBCUTANEOUS at 20:52

## 2025-07-27 RX ADMIN — ATORVASTATIN CALCIUM 10 MG: 10 TABLET, FILM COATED ORAL at 09:57

## 2025-07-27 ASSESSMENT — PAIN DESCRIPTION - LOCATION
LOCATION: BACK;LEG
LOCATION: LEG;BACK;BUTTOCKS
LOCATION: BACK;LEG

## 2025-07-27 ASSESSMENT — PAIN SCALES - GENERAL
PAINLEVEL_OUTOF10: 3
PAINLEVEL_OUTOF10: 5
PAINLEVEL_OUTOF10: 2

## 2025-07-27 ASSESSMENT — PAIN DESCRIPTION - DESCRIPTORS: DESCRIPTORS: ACHING

## 2025-07-27 ASSESSMENT — PAIN DESCRIPTION - ORIENTATION: ORIENTATION: LOWER

## 2025-07-27 NOTE — PROGRESS NOTES
Hospitalist Progress Note     Demographics    Patient Name  Jessica Mane   Date of Birth 1970   Medical Record Number  [id number]      Age  55 y.o.   PCP No primary care provider on file.   Admit date:  7/25/2025  9:57 AM     Room Number  3235/01  @ [facility]           Admission Diagnoses:  COPD exacerbation (HCC)          Assessment and plan:   Acute on chronic hypoxic respiratory failure   COPD/asthma exacerbation   Leukocytosis   Acute on chronic pain   Chronic pain syndrome   Debilitated   Complains of cough with difficulty with expectoration  Continue supportive care including nebs, supplemental oxygen, cough medicine.  Added Robitussin as needed  Continue current pain regimen as below     Pulmonary Embolism   - Continue apixaban     Chronic  pain syndrome  Chronic opioid dependence due cancer related pain   Continue lidocaine patch, hydromorphone, methadone     Hx HTN  HX CAD  Hx of congestive heart failure (HFpEF)  - Continue home medications     Insulin Dependent Diabetes  SSI     Hx Breast CA stage IV  -widely metastatic - no recent history of seeing her oncologist   - In the process of enrolling with hospice    Constipation  Bowel regimen.  Added MiraLAX scheduled and a dose of oral Dulcolax    Super morbid obesity   Likely with obesity hypoventilation    Body mass index is 58.65 kg/m².  Reference: BMI greater than 30 is classified as obesity and greater than 40 is classified as morbid obesity.        Medical Decision Making:    Relevant labs were reviewed:  Yes    Imaging report reviewed  Yes    EKG reviewed  NA   High risk/toxic drug monitoring  Yes narcotics    Care plan discussed with  Patient/Family  and Nurse      Subjective and Objective Data    Patient complains of cough with difficulty with expectoration.  Also having significant constipation.    Comments       Patient Vitals for the past 24 hrs:   BP   07/26/25 2034 (!) 137/92   07/26/25 1745 (!) 145/87

## 2025-07-28 ENCOUNTER — APPOINTMENT (OUTPATIENT)
Facility: HOSPITAL | Age: 55
DRG: 190 | End: 2025-07-28
Payer: MEDICARE

## 2025-07-28 PROBLEM — T40.2X5A THERAPEUTIC OPIOID-INDUCED CONSTIPATION (OIC): Status: ACTIVE | Noted: 2025-07-28

## 2025-07-28 PROBLEM — R10.9 CHRONIC FLANK PAIN: Status: ACTIVE | Noted: 2025-07-28

## 2025-07-28 PROBLEM — G89.3 CANCER RELATED PAIN: Status: ACTIVE | Noted: 2025-07-28

## 2025-07-28 PROBLEM — G89.29 CHRONIC FLANK PAIN: Status: ACTIVE | Noted: 2025-07-28

## 2025-07-28 PROBLEM — K59.03 THERAPEUTIC OPIOID-INDUCED CONSTIPATION (OIC): Status: ACTIVE | Noted: 2025-07-28

## 2025-07-28 LAB
ANION GAP SERPL CALC-SCNC: 5 MMOL/L (ref 2–12)
BASOPHILS # BLD: 0.05 K/UL (ref 0–0.1)
BASOPHILS NFR BLD: 0.4 % (ref 0–1)
BUN SERPL-MCNC: 25 MG/DL (ref 6–20)
BUN/CREAT SERPL: 32 (ref 12–20)
CALCIUM SERPL-MCNC: 10.6 MG/DL (ref 8.5–10.1)
CHLORIDE SERPL-SCNC: 98 MMOL/L (ref 97–108)
CO2 SERPL-SCNC: 35 MMOL/L (ref 21–32)
CREAT SERPL-MCNC: 0.77 MG/DL (ref 0.55–1.02)
DIFFERENTIAL METHOD BLD: ABNORMAL
EOSINOPHIL # BLD: 0.28 K/UL (ref 0–0.4)
EOSINOPHIL NFR BLD: 2 % (ref 0–7)
ERYTHROCYTE [DISTWIDTH] IN BLOOD BY AUTOMATED COUNT: 14.5 % (ref 11.5–14.5)
GLUCOSE BLD STRIP.AUTO-MCNC: 196 MG/DL (ref 65–117)
GLUCOSE BLD STRIP.AUTO-MCNC: 196 MG/DL (ref 65–117)
GLUCOSE BLD STRIP.AUTO-MCNC: 241 MG/DL (ref 65–117)
GLUCOSE BLD STRIP.AUTO-MCNC: 264 MG/DL (ref 65–117)
GLUCOSE SERPL-MCNC: 126 MG/DL (ref 65–100)
HCT VFR BLD AUTO: 45.6 % (ref 35–47)
HGB BLD-MCNC: 13.3 G/DL (ref 11.5–16)
IMM GRANULOCYTES # BLD AUTO: 0.2 K/UL (ref 0–0.04)
IMM GRANULOCYTES NFR BLD AUTO: 1.4 % (ref 0–0.5)
LYMPHOCYTES # BLD: 0.33 K/UL (ref 0.8–3.5)
LYMPHOCYTES NFR BLD: 2.4 % (ref 12–49)
MCH RBC QN AUTO: 27.9 PG (ref 26–34)
MCHC RBC AUTO-ENTMCNC: 29.2 G/DL (ref 30–36.5)
MCV RBC AUTO: 95.8 FL (ref 80–99)
MONOCYTES # BLD: 1.24 K/UL (ref 0–1)
MONOCYTES NFR BLD: 8.8 % (ref 5–13)
NEUTS SEG # BLD: 11.94 K/UL (ref 1.8–8)
NEUTS SEG NFR BLD: 85 % (ref 32–75)
NRBC # BLD: 0 K/UL (ref 0–0.01)
NRBC BLD-RTO: 0 PER 100 WBC
PLATELET # BLD AUTO: 182 K/UL (ref 150–400)
PMV BLD AUTO: 10.4 FL (ref 8.9–12.9)
POTASSIUM SERPL-SCNC: 3.9 MMOL/L (ref 3.5–5.1)
RBC # BLD AUTO: 4.76 M/UL (ref 3.8–5.2)
SERVICE CMNT-IMP: ABNORMAL
SODIUM SERPL-SCNC: 138 MMOL/L (ref 136–145)
WBC # BLD AUTO: 14 K/UL (ref 3.6–11)

## 2025-07-28 PROCEDURE — 6370000000 HC RX 637 (ALT 250 FOR IP): Performed by: PHYSICIAN ASSISTANT

## 2025-07-28 PROCEDURE — 6360000004 HC RX CONTRAST MEDICATION: Performed by: NURSE PRACTITIONER

## 2025-07-28 PROCEDURE — 6360000002 HC RX W HCPCS: Performed by: NURSE PRACTITIONER

## 2025-07-28 PROCEDURE — 2500000003 HC RX 250 WO HCPCS: Performed by: NURSE PRACTITIONER

## 2025-07-28 PROCEDURE — 1100000000 HC RM PRIVATE

## 2025-07-28 PROCEDURE — 6360000004 HC RX CONTRAST MEDICATION: Performed by: HOSPITALIST

## 2025-07-28 PROCEDURE — 71275 CT ANGIOGRAPHY CHEST: CPT

## 2025-07-28 PROCEDURE — 82962 GLUCOSE BLOOD TEST: CPT

## 2025-07-28 PROCEDURE — 6370000000 HC RX 637 (ALT 250 FOR IP): Performed by: HOSPITALIST

## 2025-07-28 PROCEDURE — 94660 CPAP INITIATION&MGMT: CPT

## 2025-07-28 PROCEDURE — 2700000000 HC OXYGEN THERAPY PER DAY

## 2025-07-28 PROCEDURE — 99223 1ST HOSP IP/OBS HIGH 75: CPT | Performed by: NURSE PRACTITIONER

## 2025-07-28 PROCEDURE — 6370000000 HC RX 637 (ALT 250 FOR IP): Performed by: NURSE PRACTITIONER

## 2025-07-28 PROCEDURE — 80048 BASIC METABOLIC PNL TOTAL CA: CPT

## 2025-07-28 PROCEDURE — 85025 COMPLETE CBC W/AUTO DIFF WBC: CPT

## 2025-07-28 PROCEDURE — 94640 AIRWAY INHALATION TREATMENT: CPT

## 2025-07-28 PROCEDURE — 36415 COLL VENOUS BLD VENIPUNCTURE: CPT

## 2025-07-28 RX ORDER — CASTOR OIL AND BALSAM, PERU 788; 87 MG/G; MG/G
OINTMENT TOPICAL 2 TIMES DAILY
Status: DISCONTINUED | OUTPATIENT
Start: 2025-07-28 | End: 2025-08-01 | Stop reason: HOSPADM

## 2025-07-28 RX ORDER — IOPAMIDOL 755 MG/ML
100 INJECTION, SOLUTION INTRAVASCULAR
Status: COMPLETED | OUTPATIENT
Start: 2025-07-28 | End: 2025-07-28

## 2025-07-28 RX ORDER — HYDROMORPHONE HYDROCHLORIDE 2 MG/1
6 TABLET ORAL EVERY 4 HOURS PRN
Refills: 0 | Status: DISCONTINUED | OUTPATIENT
Start: 2025-07-28 | End: 2025-07-29

## 2025-07-28 RX ADMIN — ARFORMOTEROL TARTRATE 15 MCG: 15 SOLUTION RESPIRATORY (INHALATION) at 20:30

## 2025-07-28 RX ADMIN — INSULIN LISPRO 2 UNITS: 100 INJECTION, SOLUTION INTRAVENOUS; SUBCUTANEOUS at 15:00

## 2025-07-28 RX ADMIN — APIXABAN 5 MG: 5 TABLET, FILM COATED ORAL at 21:29

## 2025-07-28 RX ADMIN — CETIRIZINE HYDROCHLORIDE 10 MG: 10 TABLET, FILM COATED ORAL at 10:35

## 2025-07-28 RX ADMIN — IPRATROPIUM BROMIDE AND ALBUTEROL SULFATE 1 DOSE: .5; 3 SOLUTION RESPIRATORY (INHALATION) at 08:40

## 2025-07-28 RX ADMIN — HYDROMORPHONE HYDROCHLORIDE 4 MG: 2 TABLET ORAL at 04:36

## 2025-07-28 RX ADMIN — METHADONE HYDROCHLORIDE 5 MG: 10 TABLET ORAL at 11:31

## 2025-07-28 RX ADMIN — IPRATROPIUM BROMIDE AND ALBUTEROL SULFATE 1 DOSE: .5; 3 SOLUTION RESPIRATORY (INHALATION) at 12:04

## 2025-07-28 RX ADMIN — ANASTROZOLE 1 MG: 1 TABLET, COATED ORAL at 10:35

## 2025-07-28 RX ADMIN — SENNOSIDES AND DOCUSATE SODIUM 2 TABLET: 8.6; 5 TABLET ORAL at 10:35

## 2025-07-28 RX ADMIN — IOPAMIDOL 100 ML: 755 INJECTION, SOLUTION INTRAVENOUS at 19:02

## 2025-07-28 RX ADMIN — ATORVASTATIN CALCIUM 10 MG: 10 TABLET, FILM COATED ORAL at 10:35

## 2025-07-28 RX ADMIN — INSULIN GLARGINE 40 UNITS: 100 INJECTION, SOLUTION SUBCUTANEOUS at 10:36

## 2025-07-28 RX ADMIN — SODIUM CHLORIDE, PRESERVATIVE FREE 10 ML: 5 INJECTION INTRAVENOUS at 11:02

## 2025-07-28 RX ADMIN — INSULIN LISPRO 2 UNITS: 100 INJECTION, SOLUTION INTRAVENOUS; SUBCUTANEOUS at 17:44

## 2025-07-28 RX ADMIN — IPRATROPIUM BROMIDE AND ALBUTEROL SULFATE 1 DOSE: .5; 3 SOLUTION RESPIRATORY (INHALATION) at 15:22

## 2025-07-28 RX ADMIN — ASPIRIN 81 MG: 81 TABLET, CHEWABLE ORAL at 10:35

## 2025-07-28 RX ADMIN — FUROSEMIDE 40 MG: 40 TABLET ORAL at 10:35

## 2025-07-28 RX ADMIN — BUDESONIDE 1000 MCG: 0.5 INHALANT RESPIRATORY (INHALATION) at 20:30

## 2025-07-28 RX ADMIN — WATER 2000 MG: 1 INJECTION INTRAMUSCULAR; INTRAVENOUS; SUBCUTANEOUS at 15:22

## 2025-07-28 RX ADMIN — PANTOPRAZOLE SODIUM 40 MG: 40 TABLET, DELAYED RELEASE ORAL at 10:35

## 2025-07-28 RX ADMIN — HYDROMORPHONE HYDROCHLORIDE 4 MG: 2 TABLET ORAL at 17:44

## 2025-07-28 RX ADMIN — APIXABAN 5 MG: 5 TABLET, FILM COATED ORAL at 10:35

## 2025-07-28 RX ADMIN — HYDROMORPHONE HYDROCHLORIDE 4 MG: 2 TABLET ORAL at 11:31

## 2025-07-28 RX ADMIN — METOPROLOL TARTRATE 25 MG: 50 TABLET, FILM COATED ORAL at 10:35

## 2025-07-28 RX ADMIN — INSULIN LISPRO 2 UNITS: 100 INJECTION, SOLUTION INTRAVENOUS; SUBCUTANEOUS at 11:35

## 2025-07-28 RX ADMIN — ARFORMOTEROL TARTRATE 15 MCG: 15 SOLUTION RESPIRATORY (INHALATION) at 08:45

## 2025-07-28 RX ADMIN — DOXYCYCLINE HYCLATE 100 MG: 100 TABLET, FILM COATED ORAL at 21:30

## 2025-07-28 RX ADMIN — DOXYCYCLINE HYCLATE 100 MG: 100 TABLET, FILM COATED ORAL at 10:35

## 2025-07-28 RX ADMIN — BUDESONIDE 1000 MCG: 0.5 INHALANT RESPIRATORY (INHALATION) at 08:45

## 2025-07-28 RX ADMIN — Medication: at 11:06

## 2025-07-28 RX ADMIN — METHADONE HYDROCHLORIDE 5 MG: 10 TABLET ORAL at 21:27

## 2025-07-28 RX ADMIN — IPRATROPIUM BROMIDE AND ALBUTEROL SULFATE 1 DOSE: .5; 3 SOLUTION RESPIRATORY (INHALATION) at 20:25

## 2025-07-28 RX ADMIN — POLYETHYLENE GLYCOL 3350 17 G: 17 POWDER, FOR SOLUTION ORAL at 10:36

## 2025-07-28 RX ADMIN — INSULIN LISPRO 4 UNITS: 100 INJECTION, SOLUTION INTRAVENOUS; SUBCUTANEOUS at 21:28

## 2025-07-28 RX ADMIN — PREDNISONE 20 MG: 20 TABLET ORAL at 10:35

## 2025-07-28 RX ADMIN — FUROSEMIDE 40 MG: 40 TABLET ORAL at 21:29

## 2025-07-28 RX ADMIN — METOPROLOL TARTRATE 25 MG: 50 TABLET, FILM COATED ORAL at 21:28

## 2025-07-28 ASSESSMENT — PAIN DESCRIPTION - ORIENTATION: ORIENTATION: LEFT;RIGHT

## 2025-07-28 ASSESSMENT — PAIN SCALES - GENERAL
PAINLEVEL_OUTOF10: 6
PAINLEVEL_OUTOF10: 6
PAINLEVEL_OUTOF10: 5
PAINLEVEL_OUTOF10: 6
PAINLEVEL_OUTOF10: 6
PAINLEVEL_OUTOF10: 5

## 2025-07-28 ASSESSMENT — PAIN DESCRIPTION - PAIN TYPE: TYPE: CHRONIC PAIN

## 2025-07-28 ASSESSMENT — PAIN DESCRIPTION - LOCATION
LOCATION: LEG
LOCATION: BACK;LEG
LOCATION: ABDOMEN;BACK;GENERALIZED

## 2025-07-28 ASSESSMENT — PAIN DESCRIPTION - FREQUENCY: FREQUENCY: CONTINUOUS

## 2025-07-28 ASSESSMENT — PAIN DESCRIPTION - DESCRIPTORS
DESCRIPTORS: DULL
DESCRIPTORS: ACHING

## 2025-07-28 ASSESSMENT — PAIN DESCRIPTION - ONSET: ONSET: ON-GOING

## 2025-07-28 NOTE — PROGRESS NOTES
Spiritual Health History and Assessment/Progress Note  [facility]    Initial Encounter,  , Life Adjustments, Adjustment to illness,      Name: Jessica Mane MRN: [id number]    Age: 55 y.o.     Sex: female   Language: English   Yazidism: Orthodoxy   COPD exacerbation (HCC)     Date: 7/28/2025            Total Time Calculated: 48 min              Spiritual Assessment began in [facility] 3 MED TELE        Referral/Consult From: Rounding   Encounter Overview/Reason: Initial Encounter  Service Provided For: Patient    Garima, Belief, Meaning:   Patient has beliefs or practices that help with coping during difficult times  Family/Friends No family/friends present      Importance and Influence:  Patient has spiritual/personal beliefs that influence decisions regarding their health  Family/Friends No family/friends present    Community:  Patient is connected with a spiritual community  Family/Friends No family/friends present    Assessment and Plan of Care:     Patient Interventions include: Facilitated expression of thoughts and feelings, Explored spiritual coping/struggle/distress, Affirmed coping skills/support systems, and Facilitated life review and/ or legacy  Family/Friends Interventions include: No family/friends present    Patient Plan of Care: Spiritual Care available upon further referral  Family/Friends Plan of Care: No family/friends present    Electronically signed by MIRELA Ferrer on 7/28/2025 at 12:34 PM

## 2025-07-28 NOTE — PROGRESS NOTES
Status: She is alert and oriented to person, place, and time.                Medications reviewed   Scheduled Meds:   balsum peru-castor oil   Topical BID    polyethylene glycol  17 g Oral Daily    bisacodyl  5 mg Oral Once    ipratropium 0.5 mg-albuterol 2.5 mg  1 Dose Inhalation 4x Daily RT    doxycycline  100 mg Oral 2 times per day    sodium chloride flush  5-40 mL IntraVENous 2 times per day    melatonin  6 mg Oral Nightly    ammonium lactate   Topical Daily    anastrozole  1 mg Oral Daily    apixaban  5 mg Oral BID    aspirin  81 mg Oral Daily    fluticasone  2 spray Nasal Daily    budesonide  1 mg Nebulization BID RT    And    arformoterol tartrate  15 mcg Nebulization BID RT    furosemide  40 mg Oral BID    insulin glargine  40 Units SubCUTAneous Daily    lidocaine  3 patch TransDERmal Q24H    cetirizine  10 mg Oral Daily    atorvastatin  10 mg Oral Daily    methadone  5 mg Oral BID    metoprolol tartrate  25 mg Oral BID    pantoprazole  40 mg Oral QAM AC    predniSONE  20 mg Oral Daily    sennosides-docusate sodium  2 tablet Oral Daily    cefTRIAXone (ROCEPHIN) IV  2,000 mg IntraVENous Q24H    insulin lispro  0-8 Units SubCUTAneous 4x Daily AC & HS     Continuous Infusions:   sodium chloride      dextrose       PRN Meds:.guaiFENesin, benzonatate, sodium chloride flush, sodium chloride, potassium chloride **OR** potassium alternative oral replacement **OR** potassium chloride, magnesium sulfate, ondansetron **OR** ondansetron, polyethylene glycol, acetaminophen **OR** acetaminophen, hydrOXYzine HCl, calcium carbonate, albuterol, albuterol sulfate HFA, HYDROmorphone, methocarbamol, glucose, dextrose bolus **OR** dextrose bolus, glucagon (rDNA), dextrose, hydrALAZINE, iopamidol       Relevant other information:   Results       Procedure Component Value Units Date/Time    Blood Culture 2 [[id number]] Collected: 07/26/25 0741    Order Status: Completed Specimen: Blood Updated: 07/28/25 0718     Special Requests  --        RIGHT  HAND       Culture NO GROWTH 2 DAYS       Blood Culture 1 [[id number]] Collected: 07/25/25 1023    Order Status: Completed Specimen: Blood Updated: 07/28/25 0718     Special Requests --        NO SPECIAL REQUESTS  RIGHT  Antecubital       Culture NO GROWTH 3 DAYS       COVID-19 & Influenza Combo [[id number]] Collected: 07/25/25 1023    Order Status: Completed Specimen: Nasopharyngeal Updated: 07/25/25 1057     Source Nasopharyngeal        SARS-CoV-2, PCR Not detected        Comment: Not Detected results do not preclude SARS-CoV-2 infection and should not be used as the sole basis for patient management decisions.Results must be combined with clinical observations, patient history, and epidemiological information.        Rapid Influenza A By PCR Not detected        Rapid Influenza B By PCR Not detected        Comment:    Testing was performed using marques Tamika SARS-CoV-2 and Influenza A/B nucleic acid assay.  This test is a multiplex Real-Time Reverse Transcriptase Polymerase Chain Reaction (RT-PCR) based in vitro diagnostic test intended for the qualitative detection of nucleic acids from SARS-CoV-2, Influenza A, and Influenza B in nasopharyngeal specimens.               Recent Labs     07/26/25  0741 07/28/25  0556   WBC 12.6* 14.0*   HGB 14.5 13.3   HCT 51.0* 45.6    182     Recent Labs     07/26/25  0741 07/27/25  0533 07/28/25  0556    136 138   K 4.4 4.1 3.9    98 98   CO2 34* 34* 35*   BUN 22* 24* 25*      Lab Results   Component Value Date/Time    TSH 0.78 07/26/2025 07:41 AM         Other medical conditions are listed in the active hospital problem list section; these and other pertinent data were taken into consideration when the treatment plan was developed and customized to this patient's unique overall circumstances and needs.  We have reviewed relevant medical records within the constraints of this admission process.   High complexity decision making was performed for

## 2025-07-28 NOTE — PROGRESS NOTES
Pulmonary, Critical Care, and Sleep Medicine~Consult Note    Name: Jessica Mane MRN: [id number]   : 1970 Hospital: [facility]   Date: 2025 12:01 PM Admission: 2025     Impression Plan   Chronic hypoxemic/hypercapnic respiratory failure, 4-5L baseline  COPD +/- acute exacerbation--no wheezing on exam today  HFpEF  H/o PE on Eliquis  metastatic breast cancer s/p XRT, not currently seeing Oncology  HTN  CAD  DM  Chronic pain on methadone  TERRA intolerant of CPAP, probably OHS  debility  Morbid obesity Supp O2 for goal sats >88%--on 4L  Bipap prn for work of breathing  Continue scheduled nebs  Ctx/doxy for COPD exacerbation  Unable to get CT chest as she is unable to lie flat due to pain  PO lasix  Prednisone burst  F/u Bcx  Home methadone  Hospice consult pending  Eliquis       Daily Progression:    : wore bipap for several hours last night, however still in significant pain and having dyspnea at rest. Is willing to retry CT scan but having trouble with pain/laying flat    Consult for respiratory failure    HPI: 54 y/o F with PMH COPD, chronic hypoxemic/hypercapnic respiratory failure on 4-5L O2, TERRA intolerant of CPAP, HFpEF, CAD, HTN, DM, chronic pain on methadone, metastatic breast cancer s/p XRT and hormonal therapy, prior PE on eliquis, decubitus ulcers due to bedridden status who presented c/o shortness of breath and left flank pain.  Of note, she was discharged home with hospice after admission 3/2025.    Labs: wbc 14.1, procal low, bnp 406, bicarb 34    CXR 25: Mild edema pattern. Hazy left basilar atelectasis/airspace disease. Moderate  left pleural effusion    Chest CT 25:  1. Subtotal atelectasis of the left lower lobe and lingula. Some of this is  positional and some of it is chronic.  2. Debris in the distal left mainstem and left upper lobe bronchi.  3. Diffuse sclerotic osseous metastases, likely from multicentric right

## 2025-07-28 NOTE — PROGRESS NOTES
End of Shift Note    Bedside shift change report given to JULY Ward (oncoming nurse) by Erica Larry LPN (offgoing nurse).  Report included the following information SBAR, Intake/Output, MAR, and Alarm Parameters     Shift worked:  0577-2237     Shift summary and any significant changes:     Pt is demanding and frequently agitated. Given PO dilaudid x2. Enema ordered - pt requested enema to be given after CT scan. Morning CT declined by pt, but she agreed to go at 1800 after pain medication.  Pt declined q2 turns and reported an inability to lay on R side for longer than 5 minutes. Plan of care ongoing     Concerns for physician to address:  None     Zone phone for oncoming shift:   [phone]       Activity:  Level of Assistance: Maximum assist, patient does 25-49%  Number times ambulated in hallways past shift: 0  Number of times OOB to chair past shift: 0    Cardiac:   Cardiac Monitoring: Yes      Cardiac Rhythm: Sinus rhythm    Access:  Current line(s): PIV     Genitourinary:   Urinary Status: Voiding, External catheter    Respiratory:   O2 Device: None (Room air)  Chronic home O2 use?: YES  Incentive spirometer at bedside: YES    GI:  Last BM (including prior to admit): 07/24/25  Current diet:  ADULT DIET; Regular  Passing flatus: NO    Pain Management:   Patient states pain is manageable on current regimen: YES    Skin:  Gold Scale Score: 17  Interventions: Wound Offloading (Prevention Methods): Bed, pressure reduction mattress, Pillows, Repositioning, Elevate heels    Patient Safety:  Fall Risk: Nursing Judgement-Fall Risk High(Add Comments): Yes  Fall Risk Interventions  Nursing Judgement-Fall Risk High(Add Comments): Yes  Toilet Every 2 Hours-In Advance of Need: Yes  Hourly Visual Checks: Awake, In bed  Fall Visual Posted: Fall sign posted, Socks  Room Door Open: Deferred to promote rest  Alarm On: Bed  Patient Moved Closer to Nursing Station: No    Active Consults:   IP CONSULT TO PHARMACY  IP CONSULT TO

## 2025-07-28 NOTE — CONSULTS
Palliative Medicine  Patient Name: Jessica Mane  YOB: 1970  MRN: [id number]  Age: 55 y.o.  Gender: female    Date of Initial Consult: 7/28/2025  Date of Service: 7/28/2025  Time: 6:23 PM  Provider: LUIS ANGEL Loredo NP  Hospital Day: 4  Admit Date: 7/25/2025  Referring Provider: Avinash Santoro MD       Reasons for Consultation:  symptom management    HISTORY OF PRESENT ILLNESS (HPI):   Jessica Mane is a 55 y.o. female with a past medical history of metastatic breast cancer on anastrazole, COPD w chronic hypoxia on 5L/min NC, HFpEF, TERRA non-compliant with BiPAP, chronic pain on methadone, Hx of PE on eliquis, decubitus ulcers due to bedridden status and chronic LE edema/ lymphedema, chronic pain on methadone, Hx of PE on eliquis, who was BIBA on 7/25/2025 from home with c/o SOB and left sided flank pain.  CXR in ED showed LLL haziness, ABG showed Ph 7.4/ PCO2 59 and bicarb 36.  Also found to have significant fluid overload.    Pt known to Palliative during last admission 2/22-3/20/25 for mixed hypoxic hypercapnic RF requiring bipap and prolonged ICU stay.  [facility]  Per [facility], pt went home with bipap and full code status.  She is noncompliant with her care, ie, does not use the bipap because she doesn't like it, when they prescribe her antibiotics she stops taking them and calls another provider for a different antibiotic and doesn't complete that Rx either.  Hospice has managed to keep pt out of hospital for the past 4 months.   Last week she lost her medicaid provided caregiver.     7/28: pt reports pain in left chest and flank area, better with recent increase in oral dilaudid from 2mg to 4mg, she does not like the IV dilaudid because it makes her perspire.  SOB is almost to her baseline now.  She does report no BM in 8 days and is agreeable to an enema.       Psychosocial: lives with nephew Mau. Bedridden (since 8/2023) at baseline, has HHA 56 hours per

## 2025-07-28 NOTE — PLAN OF CARE
Problem: Respiratory - Adult  Goal: Achieves optimal ventilation and oxygenation  7/27/2025 2009 by Marva Kuo, RT  Outcome: Progressing     Problem: Skin/Tissue Integrity  Goal: Skin integrity remains intact  Description: 1.  Monitor for areas of redness and/or skin breakdown  2.  Assess vascular access sites hourly  3.  Every 4-6 hours minimum:  Change oxygen saturation probe site  4.  Every 4-6 hours:  If on nasal continuous positive airway pressure, respiratory therapy assess nares and determine need for appliance change or resting period  Outcome: Progressing     Problem: Safety - Adult  Goal: Free from fall injury  Outcome: Progressing     Problem: ABCDS Injury Assessment  Goal: Absence of physical injury  Outcome: Progressing

## 2025-07-29 LAB
GLUCOSE BLD STRIP.AUTO-MCNC: 111 MG/DL (ref 65–117)
GLUCOSE BLD STRIP.AUTO-MCNC: 189 MG/DL (ref 65–117)
GLUCOSE BLD STRIP.AUTO-MCNC: 244 MG/DL (ref 65–117)
GLUCOSE BLD STRIP.AUTO-MCNC: 273 MG/DL (ref 65–117)
GLUCOSE BLD STRIP.AUTO-MCNC: 328 MG/DL (ref 65–117)
SERVICE CMNT-IMP: ABNORMAL
SERVICE CMNT-IMP: NORMAL
TROPONIN T SERPL HS-MCNC: 47.7 NG/L (ref 0–14)
TROPONIN T SERPL HS-MCNC: 51.8 NG/L (ref 0–14)

## 2025-07-29 PROCEDURE — 6370000000 HC RX 637 (ALT 250 FOR IP): Performed by: HOSPITALIST

## 2025-07-29 PROCEDURE — 94640 AIRWAY INHALATION TREATMENT: CPT

## 2025-07-29 PROCEDURE — 6360000002 HC RX W HCPCS: Performed by: NURSE PRACTITIONER

## 2025-07-29 PROCEDURE — 6370000000 HC RX 637 (ALT 250 FOR IP): Performed by: PHYSICIAN ASSISTANT

## 2025-07-29 PROCEDURE — 84484 ASSAY OF TROPONIN QUANT: CPT

## 2025-07-29 PROCEDURE — 82962 GLUCOSE BLOOD TEST: CPT

## 2025-07-29 PROCEDURE — 6370000000 HC RX 637 (ALT 250 FOR IP): Performed by: NURSE PRACTITIONER

## 2025-07-29 PROCEDURE — 36415 COLL VENOUS BLD VENIPUNCTURE: CPT

## 2025-07-29 PROCEDURE — 2500000003 HC RX 250 WO HCPCS: Performed by: NURSE PRACTITIONER

## 2025-07-29 PROCEDURE — 94761 N-INVAS EAR/PLS OXIMETRY MLT: CPT

## 2025-07-29 PROCEDURE — 1100000000 HC RM PRIVATE

## 2025-07-29 PROCEDURE — 2700000000 HC OXYGEN THERAPY PER DAY

## 2025-07-29 PROCEDURE — 99233 SBSQ HOSP IP/OBS HIGH 50: CPT | Performed by: NURSE PRACTITIONER

## 2025-07-29 RX ORDER — SENNA AND DOCUSATE SODIUM 50; 8.6 MG/1; MG/1
2 TABLET, FILM COATED ORAL DAILY
Qty: 60 TABLET | Refills: 0 | Status: SHIPPED | OUTPATIENT
Start: 2025-07-29

## 2025-07-29 RX ORDER — NITROGLYCERIN 0.4 MG/1
TABLET SUBLINGUAL
Status: DISPENSED
Start: 2025-07-29 | End: 2025-07-30

## 2025-07-29 RX ORDER — ALBUTEROL SULFATE 0.83 MG/ML
2.5 SOLUTION RESPIRATORY (INHALATION) EVERY 6 HOURS PRN
Qty: 120 EACH | Refills: 0 | Status: SHIPPED | OUTPATIENT
Start: 2025-07-29

## 2025-07-29 RX ORDER — BENZONATATE 100 MG/1
100 CAPSULE ORAL 3 TIMES DAILY PRN
Qty: 21 CAPSULE | Refills: 0 | Status: SHIPPED | OUTPATIENT
Start: 2025-07-29 | End: 2025-08-05

## 2025-07-29 RX ORDER — PREDNISONE 20 MG/1
20 TABLET ORAL DAILY
Qty: 10 TABLET | Refills: 0 | Status: SHIPPED | OUTPATIENT
Start: 2025-07-30 | End: 2025-08-09

## 2025-07-29 RX ADMIN — Medication: at 01:12

## 2025-07-29 RX ADMIN — IPRATROPIUM BROMIDE AND ALBUTEROL SULFATE 1 DOSE: .5; 3 SOLUTION RESPIRATORY (INHALATION) at 14:49

## 2025-07-29 RX ADMIN — MAJOR MAGNESIUM CITRATE ORAL SOLUTION - LEMON 296 ML: 1.75 LIQUID ORAL at 13:46

## 2025-07-29 RX ADMIN — ANASTROZOLE 1 MG: 1 TABLET, COATED ORAL at 11:11

## 2025-07-29 RX ADMIN — FUROSEMIDE 40 MG: 40 TABLET ORAL at 21:29

## 2025-07-29 RX ADMIN — APIXABAN 5 MG: 5 TABLET, FILM COATED ORAL at 21:34

## 2025-07-29 RX ADMIN — APIXABAN 5 MG: 5 TABLET, FILM COATED ORAL at 11:14

## 2025-07-29 RX ADMIN — WATER 2000 MG: 1 INJECTION INTRAMUSCULAR; INTRAVENOUS; SUBCUTANEOUS at 15:31

## 2025-07-29 RX ADMIN — SENNOSIDES AND DOCUSATE SODIUM 2 TABLET: 8.6; 5 TABLET ORAL at 11:11

## 2025-07-29 RX ADMIN — FUROSEMIDE 40 MG: 40 TABLET ORAL at 11:31

## 2025-07-29 RX ADMIN — DOXYCYCLINE HYCLATE 100 MG: 100 TABLET, FILM COATED ORAL at 11:31

## 2025-07-29 RX ADMIN — IPRATROPIUM BROMIDE AND ALBUTEROL SULFATE 1 DOSE: .5; 3 SOLUTION RESPIRATORY (INHALATION) at 20:19

## 2025-07-29 RX ADMIN — ATORVASTATIN CALCIUM 10 MG: 10 TABLET, FILM COATED ORAL at 11:13

## 2025-07-29 RX ADMIN — METHADONE HYDROCHLORIDE 5 MG: 10 TABLET ORAL at 11:13

## 2025-07-29 RX ADMIN — INSULIN GLARGINE 40 UNITS: 100 INJECTION, SOLUTION SUBCUTANEOUS at 11:41

## 2025-07-29 RX ADMIN — SODIUM CHLORIDE, PRESERVATIVE FREE 10 ML: 5 INJECTION INTRAVENOUS at 01:11

## 2025-07-29 RX ADMIN — Medication: at 11:12

## 2025-07-29 RX ADMIN — POLYETHYLENE GLYCOL 3350 17 G: 17 POWDER, FOR SOLUTION ORAL at 11:14

## 2025-07-29 RX ADMIN — SODIUM CHLORIDE, PRESERVATIVE FREE 10 ML: 5 INJECTION INTRAVENOUS at 11:15

## 2025-07-29 RX ADMIN — PANTOPRAZOLE SODIUM 40 MG: 40 TABLET, DELAYED RELEASE ORAL at 07:32

## 2025-07-29 RX ADMIN — METOPROLOL TARTRATE 25 MG: 50 TABLET, FILM COATED ORAL at 21:30

## 2025-07-29 RX ADMIN — IPRATROPIUM BROMIDE AND ALBUTEROL SULFATE 1 DOSE: .5; 3 SOLUTION RESPIRATORY (INHALATION) at 11:49

## 2025-07-29 RX ADMIN — Medication: at 21:34

## 2025-07-29 RX ADMIN — PREDNISONE 20 MG: 20 TABLET ORAL at 11:14

## 2025-07-29 RX ADMIN — IPRATROPIUM BROMIDE AND ALBUTEROL SULFATE 1 DOSE: .5; 3 SOLUTION RESPIRATORY (INHALATION) at 07:55

## 2025-07-29 RX ADMIN — METHADONE HYDROCHLORIDE 5 MG: 10 TABLET ORAL at 21:30

## 2025-07-29 RX ADMIN — INSULIN LISPRO 6 UNITS: 100 INJECTION, SOLUTION INTRAVENOUS; SUBCUTANEOUS at 17:06

## 2025-07-29 RX ADMIN — INSULIN LISPRO 2 UNITS: 100 INJECTION, SOLUTION INTRAVENOUS; SUBCUTANEOUS at 13:18

## 2025-07-29 RX ADMIN — DOXYCYCLINE HYCLATE 100 MG: 100 TABLET, FILM COATED ORAL at 21:29

## 2025-07-29 RX ADMIN — ARFORMOTEROL TARTRATE 15 MCG: 15 SOLUTION RESPIRATORY (INHALATION) at 20:24

## 2025-07-29 RX ADMIN — SODIUM CHLORIDE, PRESERVATIVE FREE 10 ML: 5 INJECTION INTRAVENOUS at 21:34

## 2025-07-29 RX ADMIN — INSULIN LISPRO 2 UNITS: 100 INJECTION, SOLUTION INTRAVENOUS; SUBCUTANEOUS at 21:28

## 2025-07-29 RX ADMIN — BUDESONIDE 1000 MCG: 0.5 INHALANT RESPIRATORY (INHALATION) at 08:03

## 2025-07-29 RX ADMIN — ARFORMOTEROL TARTRATE 15 MCG: 15 SOLUTION RESPIRATORY (INHALATION) at 08:03

## 2025-07-29 RX ADMIN — METOPROLOL TARTRATE 25 MG: 50 TABLET, FILM COATED ORAL at 11:11

## 2025-07-29 RX ADMIN — Medication: at 11:19

## 2025-07-29 RX ADMIN — ASPIRIN 81 MG: 81 TABLET, CHEWABLE ORAL at 11:13

## 2025-07-29 RX ADMIN — CETIRIZINE HYDROCHLORIDE 10 MG: 10 TABLET, FILM COATED ORAL at 11:14

## 2025-07-29 RX ADMIN — BUDESONIDE 1000 MCG: 0.5 INHALANT RESPIRATORY (INHALATION) at 20:24

## 2025-07-29 RX ADMIN — METHOCARBAMOL 750 MG: 750 TABLET ORAL at 15:30

## 2025-07-29 RX ADMIN — FLUTICASONE PROPIONATE 2 SPRAY: 50 SPRAY, METERED NASAL at 11:15

## 2025-07-29 ASSESSMENT — PAIN SCALES - GENERAL
PAINLEVEL_OUTOF10: 0
PAINLEVEL_OUTOF10: 4
PAINLEVEL_OUTOF10: 7
PAINLEVEL_OUTOF10: 0
PAINLEVEL_OUTOF10: 6
PAINLEVEL_OUTOF10: 7

## 2025-07-29 ASSESSMENT — PAIN - FUNCTIONAL ASSESSMENT
PAIN_FUNCTIONAL_ASSESSMENT: PREVENTS OR INTERFERES SOME ACTIVE ACTIVITIES AND ADLS
PAIN_FUNCTIONAL_ASSESSMENT: PREVENTS OR INTERFERES SOME ACTIVE ACTIVITIES AND ADLS
PAIN_FUNCTIONAL_ASSESSMENT: PREVENTS OR INTERFERES WITH MANY ACTIVE NOT PASSIVE ACTIVITIES

## 2025-07-29 ASSESSMENT — PAIN DESCRIPTION - DESCRIPTORS
DESCRIPTORS: ACHING
DESCRIPTORS: CRAMPING
DESCRIPTORS: ACHING

## 2025-07-29 ASSESSMENT — PAIN DESCRIPTION - LOCATION
LOCATION: BACK
LOCATION: GENERALIZED;BACK
LOCATION: BACK
LOCATION: BACK

## 2025-07-29 ASSESSMENT — PAIN DESCRIPTION - ORIENTATION
ORIENTATION: LOWER
ORIENTATION: LEFT
ORIENTATION: RIGHT;LEFT

## 2025-07-29 NOTE — SIGNIFICANT EVENT
Rapid Called at 1850    Responded to RRT at 1850 for Chest Pain    Provider at bedside: Yes  Interventions ordered: Labs and EKG, nitro  Sepsis Suspected: No  Transfer to Higher Level of Care: na  Blood Glucose: 273     Situation  Pt complained CP, RRT was called.     Background  COPD, DM2, PE.     Assessment  Alert, AOX4, left flank pain, no SOB.    Intervention/Recommendation  EKG, troponin, nitro (refused by PT)    Reevaluation  Pain is better.     RRT verbally is released by primary RN.     Visit Vitals  /76   Pulse 85   Temp 98.1 °F (36.7 °C) (Oral)   Resp 20   Ht 1.6 m (5' 2.99\")   Wt (!) 150.1 kg (331 lb)   SpO2 95%   BMI 58.65 kg/m²        Rapid Ended at 1900  RRT RN assisted with transport to accepting unit NA.    Gregoria Serrano RN

## 2025-07-29 NOTE — DISCHARGE SUMMARY
Physician Discharge Summary 7/     Pt Name  Jessica Mane   Admit date:  7/25/2025   Discharge date and time:   7/30/2025   Room Number  3235/01    [facility]    Medical Record Number  [id number]    Age  55 y.o.   Date of Birth 1970   PCP No primary care provider on file.   Admission Diagnoses: COPD exacerbation (HCC)   Present on Admission:   COPD exacerbation (HCC)   Therapeutic opioid-induced constipation (OIC)   Cancer related pain   Chronic flank pain     No Known Allergies      Excerpt from HPI :   \"CHIEF COMPLAINT: I couldn't breathe it got worse     HISTORY OF PRESENT ILLNESS:     Jessica Mane is a 55 y.o.  female with PMHx significant for chronic diseases listed below presents to the ED for complaints of diseases listed below. Presents to the ED for complaints of shortness of breath that started approx one week ago, over the course of two to three days symptoms became worse. She also notes pain to right lower ext now to her left. During the exam history pt noted back pain, abd pain, arm pain, her history was very inconsistent, unsure of when symptoms started, nothing made it better or worse. Pt is bedridden. She notes she had a personal care aide, she is now cared for by her nephew. On exam pt noted in NAD able to speak in complete sentences, as the conversation continued she appeared less short of breath, asking for ice and to be repositioned.  Pulmonary consulted as pt does not recall when was the last time she was seen by a pulmonologist.      We were asked to admit for work up and evaluation of the above.\"     Hospital Course:   Patient is a 55-year-old female with past medical history significant for stage IV metastatic breast cancer who reportedly has been under hospice care until recently, hospitalized and treated for the following problems after she initially presented with worsening shortness of breath and diffuse musculoskeletal pain including back pain,  awake and oriented          Medication List        START taking these medications      benzonatate 100 MG capsule  Commonly known as: TESSALON  Take 1 capsule by mouth 3 times daily as needed for Cough            CHANGE how you take these medications      predniSONE 20 MG tablet  Commonly known as: DELTASONE  Take 1 tablet by mouth daily for 10 days  Start taking on: July 30, 2025  What changed:   medication strength  how much to take            CONTINUE taking these medications      acetaminophen 500 MG tablet  Commonly known as: TYLENOL     * albuterol sulfate  (90 Base) MCG/ACT inhaler  Commonly known as: Ventolin HFA  Inhale 2 puffs into the lungs 4 times daily as needed for Wheezing     * albuterol (2.5 MG/3ML) 0.083% nebulizer solution  Commonly known as: PROVENTIL  Take 3 mLs by nebulization every 6 hours as needed for Wheezing     ammonium lactate 12 % lotion  Commonly known as: LAC-HYDRIN  Apply topically as needed.     anastrozole 1 MG tablet  Commonly known as: ARIMIDEX  Take 1 tablet by mouth daily     apixaban 5 MG Tabs tablet  Commonly known as: ELIQUIS  Take 1 tablet by mouth 2 times daily     aspirin 81 MG chewable tablet     * blood glucose test strips  Test 3 times a day & as needed for symptoms of irregular blood glucose. Dispense sufficient amount for indicated testing frequency plus additional to accommodate PRN testing needs.     * blood glucose test strips strip  Commonly known as: ASCENSIA AUTODISC VI;ONE TOUCH ULTRA TEST VI  1 each by In Vitro route daily As needed.     docusate sodium 100 MG capsule  Commonly known as: COLACE     fluticasone 50 MCG/ACT nasal spray  Commonly known as: FLONASE     furosemide 40 MG tablet  Commonly known as: LASIX  Take 1 tablet by mouth 2 times daily     glipiZIDE 10 MG tablet  Commonly known as: GLUCOTROL     * glucose monitoring kit  1 kit by Does not apply route daily     * True Metrix Meter Marina  Use as directed     guaiFENesin 600 MG extended

## 2025-07-29 NOTE — PROGRESS NOTES
Palliative Medicine  Patient Name: Jessica Mane  YOB: 1970  MRN: [id number]  Age: 55 y.o.  Gender: female    Date of Initial Consult: 7/28/2025  Date of Service: 7/29/2025  Time: 9:09 AM  Provider: LUIS ANGEL Loredo NP  Hospital Day: 5  Admit Date: 7/25/2025  Referring Provider: Avinash Santoro MD       Reasons for Consultation:  symptom management    HISTORY OF PRESENT ILLNESS (HPI):   Jessica Mane is a 55 y.o. female with a past medical history of metastatic breast cancer on anastrazole, COPD w chronic hypoxia on 5L/min NC, HFpEF, TERRA non-compliant with BiPAP, chronic pain on methadone, Hx of PE on eliquis, decubitus ulcers due to bedridden status and chronic LE edema/ lymphedema, chronic pain on methadone, Hx of PE on eliquis, who was BIBA on 7/25/2025 from home with c/o SOB and left sided flank pain.  CXR in ED showed LLL haziness, ABG showed Ph 7.4/ PCO2 59 and bicarb 36.  Also found to have significant fluid overload.    Pt known to Palliative during last admission 2/22-3/20/25 for mixed hypoxic hypercapnic RF requiring bipap and prolonged ICU stay.  She was [facility].  Per [facility], pt went home with bipap and full code status.  She is noncompliant with her care, ie, does not use the bipap because she doesn't like it, when they prescribe her antibiotics she stops taking them and calls another provider for a different antibiotic and doesn't complete that Rx either.  Hospice has managed to keep pt out of hospital for the past 4 months.   Last week she lost her medicaid provided caregiver.   7/28: pt reports pain in left chest and flank area, better with recent increase in oral dilaudid from 2mg to 4mg, she does not like the IV dilaudid because it makes her perspire.  SOB is almost to her baseline now.  She does report no BM in 8 days and is agreeable to an enema.     7/29: per pt, no BM yesterday or today even with enema.  She reports the 4 mg doses of dilaudid she took

## 2025-07-29 NOTE — PROGRESS NOTES
Pulmonary, Critical Care, and Sleep Medicine~Consult Note    Name: Jessica Mane MRN: [id number]   : 1970 Hospital: [facility]   Date: 2025 12:26 PM Admission: 2025     Impression Plan   Chronic hypoxemic/hypercapnic respiratory failure, 4-5L baseline  COPD +/- acute exacerbation--no wheezing on exam today  HFpEF  H/o PE on Eliquis  metastatic breast cancer s/p XRT, not currently seeing Oncology  HTN  CAD  DM  Chronic pain on methadone  TERRA intolerant of CPAP, probably OHS  debility  Morbid obesity Supp O2 for goal sats >88%--on 4L  Bipap prn for work of breathing  Continue scheduled nebs  Ctx/doxy for COPD exacerbation  CTA with no PE, progression of metastatic disease  PO lasix  Prednisone burst  F/u Bcx  Home methadone  Hospice consult. Appreciate their recs.   Eliquis      Discussed care with hospitalist,  and RN at bedside. Pt in agreement with plan. From a pulmonary standpoint she is close to her baseline respiratory status. Could benefit from LTAC due to her debility and care needs.      Daily Progression:  : CTA with no PE but worsening disease progression. Dyspnea at baseline this AM. On home O2 needs. Wore bipap for a few hours last night.     : wore bipap for several hours last night, however still in significant pain and having dyspnea at rest. Is willing to retry CT scan but having trouble with pain/laying flat    Consult for respiratory failure    HPI: 54 y/o F with PMH COPD, chronic hypoxemic/hypercapnic respiratory failure on 4-5L O2, TERRA intolerant of CPAP, HFpEF, CAD, HTN, DM, chronic pain on methadone, metastatic breast cancer s/p XRT and hormonal therapy, prior PE on eliquis, decubitus ulcers due to bedridden status who presented c/o shortness of breath and left flank pain.  Of note, she was discharged home with hospice after admission 3/2025.    Labs: wbc 14.1, procal low, bnp 406, bicarb 34    CXR 25:  Mild edema pattern. Hazy left basilar atelectasis/airspace disease. Moderate  left pleural effusion    Chest CT 25:  1. Subtotal atelectasis of the left lower lobe and lingula. Some of this is  positional and some of it is chronic.  2. Debris in the distal left mainstem and left upper lobe bronchi.  3. Diffuse sclerotic osseous metastases, likely from multicentric right breast  carcinoma.  4. Ascending thoracic aortic aneurysm (41 mm).  5. Moderate to severe LAD and LCx coronary calcification.  6. Diminutive/slitlike SVC. This may be due to dehydration, though the IVC is  normal caliber.    Followed by [facility] but has not been seen since 2023. No PFTs on file with our office.    ROS: Seen in bed; appears comfortable and speaking in full sentences. She c/o left-sided chest pain which radiates to her LUQ. She is also having left groin and buttock pain. She c/o shortness of breath and cough productive of white or beige sputum. Symptoms have been ongoing for several weeks.  She has not been using her Trelegy inhaler since symptoms began.      I have reviewed the labs and previous day’s notes.    Pertinent items are noted in HPI.  Past Medical History:   Diagnosis Date    Arthritis     Asthma     Breast lump     CAD (coronary artery disease)     Cancer (HCC)     breast cancer    Congestive heart failure (HCC)     COPD (chronic obstructive pulmonary disease) (HCC)     Diabetes (HCC)     Hypertension     Morbid obesity with BMI of 70 and over, adult (HCC)     NSTEMI (non-ST elevated myocardial infarction) (HCC) 2012    NSTEMI (non-ST elevated myocardial infarction) (HCC)     SVT (supraventricular tachycardia)       Past Surgical History:   Procedure Laterality Date     SECTION      ORTHOPEDIC SURGERY      OTHER SURGICAL HISTORY      cyst removed from back    MS UNLISTED PROCEDURE CARDIAC SURGERY      Stents    US BREAST BIOPSY W LOC DEVICE 1ST LESION RIGHT Right 2018    US BREAST NEEDLE BIOPSY RIGHT

## 2025-07-29 NOTE — PROGRESS NOTES
ADULT PROTOCOL: JET AEROSOL ASSESSMENT    Patient  Jessica Mane     55 y.o.   female     7/29/2025  8:12 AM    Breath Sounds Pre Procedure: Breath Sounds Pre-Tx NITO: Diminished                                  Breath Sounds Pre-Tx LLL: Diminished        Breath Sounds Pre-Tx RUL: Diminished        Breath Sounds Pre-Tx RML: Diminished        Breath Sounds Pre-Tx RLL: Diminished  Breath Sounds Post Procedure: Breath Sounds Post-Tx NITO: Diminished          Breath Sounds Post-Tx LLL: Diminished          Breath Sounds Post-Tx RUL: Diminished          Breath Sounds Post-Tx RML: Diminished          Breath Sounds Post-Tx RLL: Diminished                                     Heart Rate: Pre procedure Pre-Tx Pulse: 89           Post procedure Post-Tx Pulse: 93    Resp Rate: Pre procedure Pre-Tx Resps: 18           Post procedure Post-Tx Resps: 18    Oxygen: O2 Therapy: Oxygen humidified   nasal cannula     Changed: Yes    SpO2:  SpO2: 93 %   with Oxygen                Nebulizer Therapy: Current medications Medications: Budesonide/Formoterol      Changed: No        Problem List:   Patient Active Problem List   Diagnosis    Controlled diabetes mellitus type 2 with complications (HCC)    Pulmonary embolism (HCC)    Acute on chronic respiratory failure (HCC)    ACP (advance care planning)    Tachycardia    Chronic pain syndrome    Carcinoma of breast metastatic to multiple sites (HCC)    Acute and chronic respiratory failure with hypoxia (HCC)    Recurrent bacteremia    Coagulase negative Staphylococcus bacteremia    Aspiration pneumonia of both upper lobes due to gastric secretions (HCC)    Full code status    Palliative care encounter    Hypomagnesemia    Community acquired pneumonia of left lung, unspecified part of lung    Acute hypoxic respiratory failure (HCC)    Influenza A (H1N1)    Hypercapnic respiratory failure (HCC)    Pain due to malignant neoplasm metastatic to bone (HCC)    Carcinoma of breast metastatic to bone

## 2025-07-30 LAB
EKG ATRIAL RATE: 117 BPM
EKG DIAGNOSIS: NORMAL
EKG P AXIS: 94 DEGREES
EKG P-R INTERVAL: 192 MS
EKG Q-T INTERVAL: 284 MS
EKG QRS DURATION: 76 MS
EKG QTC CALCULATION (BAZETT): 427 MS
EKG R AXIS: 103 DEGREES
EKG T AXIS: 44 DEGREES
EKG VENTRICULAR RATE: 136 BPM
GLUCOSE BLD STRIP.AUTO-MCNC: 132 MG/DL (ref 65–117)
GLUCOSE BLD STRIP.AUTO-MCNC: 188 MG/DL (ref 65–117)
GLUCOSE BLD STRIP.AUTO-MCNC: 203 MG/DL (ref 65–117)
GLUCOSE BLD STRIP.AUTO-MCNC: 206 MG/DL (ref 65–117)
SERVICE CMNT-IMP: ABNORMAL

## 2025-07-30 PROCEDURE — 6370000000 HC RX 637 (ALT 250 FOR IP): Performed by: PHYSICIAN ASSISTANT

## 2025-07-30 PROCEDURE — 94660 CPAP INITIATION&MGMT: CPT

## 2025-07-30 PROCEDURE — 6370000000 HC RX 637 (ALT 250 FOR IP): Performed by: HOSPITALIST

## 2025-07-30 PROCEDURE — 82962 GLUCOSE BLOOD TEST: CPT

## 2025-07-30 PROCEDURE — 6370000000 HC RX 637 (ALT 250 FOR IP): Performed by: NURSE PRACTITIONER

## 2025-07-30 PROCEDURE — 1100000000 HC RM PRIVATE

## 2025-07-30 PROCEDURE — 6360000002 HC RX W HCPCS: Performed by: NURSE PRACTITIONER

## 2025-07-30 PROCEDURE — 94640 AIRWAY INHALATION TREATMENT: CPT

## 2025-07-30 PROCEDURE — 94761 N-INVAS EAR/PLS OXIMETRY MLT: CPT

## 2025-07-30 PROCEDURE — 2500000003 HC RX 250 WO HCPCS: Performed by: NURSE PRACTITIONER

## 2025-07-30 PROCEDURE — 2700000000 HC OXYGEN THERAPY PER DAY

## 2025-07-30 PROCEDURE — 94760 N-INVAS EAR/PLS OXIMETRY 1: CPT

## 2025-07-30 RX ORDER — LACTULOSE 10 G/15ML
10 SOLUTION ORAL 3 TIMES DAILY
Status: DISCONTINUED | OUTPATIENT
Start: 2025-07-30 | End: 2025-07-30

## 2025-07-30 RX ADMIN — BUDESONIDE 1000 MCG: 0.5 INHALANT RESPIRATORY (INHALATION) at 07:50

## 2025-07-30 RX ADMIN — INSULIN GLARGINE 40 UNITS: 100 INJECTION, SOLUTION SUBCUTANEOUS at 11:55

## 2025-07-30 RX ADMIN — ANASTROZOLE 1 MG: 1 TABLET, COATED ORAL at 11:51

## 2025-07-30 RX ADMIN — PREDNISONE 20 MG: 20 TABLET ORAL at 11:47

## 2025-07-30 RX ADMIN — DOXYCYCLINE HYCLATE 100 MG: 100 TABLET, FILM COATED ORAL at 11:55

## 2025-07-30 RX ADMIN — ASPIRIN 81 MG: 81 TABLET, CHEWABLE ORAL at 11:55

## 2025-07-30 RX ADMIN — FUROSEMIDE 40 MG: 40 TABLET ORAL at 11:51

## 2025-07-30 RX ADMIN — METOPROLOL TARTRATE 25 MG: 50 TABLET, FILM COATED ORAL at 11:51

## 2025-07-30 RX ADMIN — IPRATROPIUM BROMIDE AND ALBUTEROL SULFATE 1 DOSE: .5; 3 SOLUTION RESPIRATORY (INHALATION) at 15:33

## 2025-07-30 RX ADMIN — WATER 2000 MG: 1 INJECTION INTRAMUSCULAR; INTRAVENOUS; SUBCUTANEOUS at 16:33

## 2025-07-30 RX ADMIN — CETIRIZINE HYDROCHLORIDE 10 MG: 10 TABLET, FILM COATED ORAL at 11:52

## 2025-07-30 RX ADMIN — APIXABAN 5 MG: 5 TABLET, FILM COATED ORAL at 11:52

## 2025-07-30 RX ADMIN — ATORVASTATIN CALCIUM 10 MG: 10 TABLET, FILM COATED ORAL at 11:55

## 2025-07-30 RX ADMIN — APIXABAN 5 MG: 5 TABLET, FILM COATED ORAL at 21:42

## 2025-07-30 RX ADMIN — PANTOPRAZOLE SODIUM 40 MG: 40 TABLET, DELAYED RELEASE ORAL at 05:16

## 2025-07-30 RX ADMIN — DOXYCYCLINE HYCLATE 100 MG: 100 TABLET, FILM COATED ORAL at 21:42

## 2025-07-30 RX ADMIN — POLYETHYLENE GLYCOL 3350 17 G: 17 POWDER, FOR SOLUTION ORAL at 11:50

## 2025-07-30 RX ADMIN — INSULIN LISPRO 2 UNITS: 100 INJECTION, SOLUTION INTRAVENOUS; SUBCUTANEOUS at 14:14

## 2025-07-30 RX ADMIN — Medication: at 14:20

## 2025-07-30 RX ADMIN — SODIUM CHLORIDE, PRESERVATIVE FREE 10 ML: 5 INJECTION INTRAVENOUS at 21:45

## 2025-07-30 RX ADMIN — SODIUM CHLORIDE, PRESERVATIVE FREE 10 ML: 5 INJECTION INTRAVENOUS at 11:50

## 2025-07-30 RX ADMIN — Medication: at 21:46

## 2025-07-30 RX ADMIN — IPRATROPIUM BROMIDE AND ALBUTEROL SULFATE 1 DOSE: .5; 3 SOLUTION RESPIRATORY (INHALATION) at 20:45

## 2025-07-30 RX ADMIN — METOPROLOL TARTRATE 25 MG: 50 TABLET, FILM COATED ORAL at 21:41

## 2025-07-30 RX ADMIN — IPRATROPIUM BROMIDE AND ALBUTEROL SULFATE 1 DOSE: .5; 3 SOLUTION RESPIRATORY (INHALATION) at 11:17

## 2025-07-30 RX ADMIN — BUDESONIDE 1000 MCG: 0.5 INHALANT RESPIRATORY (INHALATION) at 20:50

## 2025-07-30 RX ADMIN — FLUTICASONE PROPIONATE 2 SPRAY: 50 SPRAY, METERED NASAL at 12:06

## 2025-07-30 RX ADMIN — INSULIN LISPRO 2 UNITS: 100 INJECTION, SOLUTION INTRAVENOUS; SUBCUTANEOUS at 21:45

## 2025-07-30 RX ADMIN — IPRATROPIUM BROMIDE AND ALBUTEROL SULFATE 1 DOSE: .5; 3 SOLUTION RESPIRATORY (INHALATION) at 07:43

## 2025-07-30 RX ADMIN — ACETAMINOPHEN 650 MG: 325 TABLET ORAL at 16:32

## 2025-07-30 RX ADMIN — ARFORMOTEROL TARTRATE 15 MCG: 15 SOLUTION RESPIRATORY (INHALATION) at 07:49

## 2025-07-30 RX ADMIN — SENNOSIDES AND DOCUSATE SODIUM 2 TABLET: 8.6; 5 TABLET ORAL at 11:51

## 2025-07-30 RX ADMIN — METHADONE HYDROCHLORIDE 5 MG: 10 TABLET ORAL at 11:51

## 2025-07-30 RX ADMIN — METHADONE HYDROCHLORIDE 5 MG: 10 TABLET ORAL at 21:41

## 2025-07-30 RX ADMIN — FUROSEMIDE 40 MG: 40 TABLET ORAL at 21:41

## 2025-07-30 RX ADMIN — Medication: at 12:07

## 2025-07-30 RX ADMIN — ARFORMOTEROL TARTRATE 15 MCG: 15 SOLUTION RESPIRATORY (INHALATION) at 20:50

## 2025-07-30 ASSESSMENT — PAIN SCALES - GENERAL
PAINLEVEL_OUTOF10: 5
PAINLEVEL_OUTOF10: 3
PAINLEVEL_OUTOF10: 0
PAINLEVEL_OUTOF10: 0

## 2025-07-30 ASSESSMENT — PAIN DESCRIPTION - LOCATION: LOCATION: HEAD

## 2025-07-30 ASSESSMENT — PAIN DESCRIPTION - DESCRIPTORS: DESCRIPTORS: ACHING

## 2025-07-30 ASSESSMENT — PAIN DESCRIPTION - ORIENTATION: ORIENTATION: POSTERIOR

## 2025-07-30 NOTE — PROGRESS NOTES
Hospitalist Progress Note    NAME:   Jessica Mane   : 1970   MRN: [id number]     Date/Time: 2025 9:28 AM  Patient PCP: No primary care provider on file.    Estimated discharge date:  Barriers: needs hospice DME     Assessment / Plan:  Acute on chronic hypoxic respiratory failure   COPD/asthma exacerbation   Leukocytosis   Acute on chronic pain   Chronic pain syndrome   Debilitated   Unchanged cough and shortness of breath  Continue supportive care including nebs, supplemental oxygen, cough medicine.    Continue current pain regimen as below  CT chest PE protocol done on  -  , no evidence of pulmonary embolism: Showed progression of known right multicentric right breast cancer with diffuse sclerotic osseous metastasis and indeterminate hepatic lesions suspicious for metastasis  On ceftri , s/p doxy      Prior history of pulmonary Embolism   Continue apixaban     Chronic  pain syndrome  Chronic opioid dependence due cancer related pain   Continue lidocaine patch, hydromorphone, methadone  Palliative care following, appreciate input     Episode of chest pain with diffuse back pain and abdominal pain  Complained of an episode of chest pain in the afternoon today, RRT called  EKG showed no significant acute ischemic changes.  Troponin slightly elevated, uncertain significance--51.8, 47.7  Chest pain has resolved without the use of nitroglycerin   Likely part of her pain syndrome  Patient is free of chest pain today     Hx HTN  HX CAD  Hx of congestive heart failure (HFpEF)  Continue home medications     Insulin Dependent Diabetes  SSI  Diabetic diet   Hypoglycemia protocol in place     Hx Breast CA stage IV  -widely metastatic  Plan to resume hospice on discharge     Constipation  Bowel regimen.  Added MiraLAX scheduled and a dose of oral Dulcolax  ADDED lactulose enema , and oral lactulose   Needs to have bm for discharge     Super morbid obesity   Likely with obesity hypoventilation

## 2025-07-30 NOTE — PROGRESS NOTES
Palliative Medicine  Per Ijeoma Hebert NP: Jessica Mane is a 55 y.o. female with a past medical history of metastatic breast cancer on anastrazole, COPD w chronic hypoxia on 5L/min NC, HFpEF, TERRA non-compliant with BiPAP, chronic pain on methadone, Hx of PE on eliquis, decubitus ulcers due to bedridden status and chronic LE edema/ lymphedema, chronic pain on methadone, Hx of PE on eliquis, who was BIBA on 2025 from home with c/o SOB and left sided flank pain.  CXR in ED showed LLL haziness, ABG showed Ph 7.4/ PCO2 59 and bicarb 36.  Also found to have significant fluid overload.               Pt known to Palliative during last admission -3/20/25 for mixed hypoxic hypercapnic RF requiring bipap and prolonged ICU stay.  She was discharged with [facility].  Per [facility], pt went home with bipap and full code status.  She is noncompliant with her care, ie, does not use the bipap because she doesn't like it, when they prescribe her antibiotics she stops taking them and calls another provider for a different antibiotic and doesn't complete that Rx either.  Hospice has managed to keep pt out of hospital for the past 4 months.   Last week she lost her medicaid provided caregiver.   : pt reports pain in left chest and flank area, better with recent increase in oral dilaudid from 2mg to 4mg, she does not like the IV dilaudid because it makes her perspire.  SOB is almost to her baseline now.  She does report no BM in 8 days and is agreeable to an enema.      : per pt, no BM yesterday or today even with enema.  She reports the 4 mg doses of dilaudid she took yesterday made her very sleepy (3 doses 6-12 hours apart).  This am she rates her pain 6/10.      Code Status: Full Code    [facility]/28/2025     4:10 PM   Demographics   Marital Status Single   AMD on file:  Primary Decision Maker: Yunior Mane - Zoraida - [phone]    Secondary Decision Maker: Ariana Mane (Jaane) -

## 2025-07-30 NOTE — PROGRESS NOTES
Pulmonary, Critical Care, and Sleep Medicine~Consult Note    Name: Jessica Mane MRN: [id number]   : 1970 Hospital: [facility]   Date: 2025 1:09 PM Admission: 2025     Impression Plan   Chronic hypoxemic/hypercapnic respiratory failure, 4-5L baseline  COPD +/- acute exacerbation--no wheezing on exam today  HFpEF  H/o PE on Eliquis  metastatic breast cancer s/p XRT, not currently seeing Oncology  HTN  CAD  DM  Chronic pain on methadone  TERRA intolerant of CPAP, probably OHS  debility  Morbid obesity Supp O2 for goal sats >88%--on 4L  Bipap prn for work of breathing  Continue scheduled nebs  Ctx/doxy for COPD exacerbation  CTA with no PE, progression of metastatic disease  PO lasix  Prednisone burst  F/u Bcx  Home methadone  Hospice consult. Appreciate their recs.   Eliquis    From a pulmonary standpoint she is close to her baseline respiratory status. Could benefit from LTAC due to her debility and care needs.      Daily Progression:  : Reviewed overnight events. Pt states her breathing is worse but cannot explain why. On her baseline O2. No wheeze or rhonchi appreciable. Doesn't want to go to an LTACH due to her loss of Medicaid income if she goes and she has bills to pay.     : CTA with no PE but worsening disease progression. Dyspnea at baseline this AM. On home O2 needs. Wore bipap for a few hours last night.     : wore bipap for several hours last night, however still in significant pain and having dyspnea at rest. Is willing to retry CT scan but having trouble with pain/laying flat    Consult for respiratory failure    HPI: 56 y/o F with PMH COPD, chronic hypoxemic/hypercapnic respiratory failure on 4-5L O2, TERRA intolerant of CPAP, HFpEF, CAD, HTN, DM, chronic pain on methadone, metastatic breast cancer s/p XRT and hormonal therapy, prior PE on eliquis, decubitus ulcers due to bedridden status who presented c/o shortness of breath  MD Branden   blood glucose test strips (ASCENSIA AUTODISC VI;ONE TOUCH ULTRA TEST VI) strip 1 each by In Vitro route daily As needed. 11/2/23  Yes Otto Olivares Jr., MD   Blood Glucose Monitoring Suppl (TRUE METRIX METER) HIPOLITO Use as directed 11/2/23  Yes Otto Olivares Jr., MD   glucose monitoring kit 1 kit by Does not apply route daily 11/1/23  Yes Bradford Gomez MD   blood glucose monitor strips Test 3 times a day & as needed for symptoms of irregular blood glucose. Dispense sufficient amount for indicated testing frequency plus additional to accommodate PRN testing needs. 11/1/23  Yes Bradford Gomez MD   apixaban (ELIQUIS) 5 MG TABS tablet Take 1 tablet by mouth 2 times daily 10/7/23  Yes Glory Calzada MD   anastrozole (ARIMIDEX) 1 MG tablet Take 1 tablet by mouth daily 8/29/23  Yes Cece Kline, PA   aspirin 81 MG chewable tablet Take 1 tablet by mouth daily   Yes Automatic Reconciliation, Ar   fluticasone-umeclidin-vilant (TRELEGY ELLIPTA) 100-62.5-25 MCG/ACT AEPB inhaler Inhale 1 puff into the lungs daily   Yes Automatic Reconciliation, Ar   lidocaine (LIDODERM) 5 % Place 1 patch onto the skin every 24 hours 4/11/22  Yes Automatic Reconciliation, Ar   loratadine (CLARITIN) 10 MG tablet Take 1 tablet by mouth daily   Yes Automatic Reconciliation, Ar   lovastatin (MEVACOR) 40 MG tablet Take 1 tablet by mouth nightly 1/14/16  Yes Automatic Reconciliation, Ar   metoprolol tartrate (LOPRESSOR) 25 MG tablet Take 1 tablet by mouth 2 times daily   Yes Automatic Reconciliation, Ar   omeprazole (PRILOSEC) 40 MG delayed release capsule Take 1 capsule by mouth daily   Yes Automatic Reconciliation, Ar   nystatin (MYCOSTATIN) 056828 UNIT/GM powder Apply topically 4 times daily    ProviderDeven MD   polyethylene glycol (MIRALAX) 17 g PACK packet Take 17 g by mouth daily    Deven Escobar MD   sodium chloride (OCEAN, BABY AYR) 0.65 % nasal spray 1 spray by Nasal route as needed for

## 2025-07-30 NOTE — PROGRESS NOTES
End of Shift Note    Bedside shift change report given to Evelio MCDOWELL (oncoming nurse) by Nithya Major RN (offgoing nurse).  Report included the following information SBAR, Kardex, and MAR    Shift worked:  7a-7p     Shift summary and any significant changes:    Patient tolerated care well. Medications were given per the MAR. Caring rounds have been completed. Patient is bedridden. Incontinence care has been completed. Patient is a x1 assist to turn in the bed. PRN tylenol x1 given.       Activity:  Level of Assistance: Maximum assist, patient does 25-49%  Number times ambulated in hallways past shift: 0  Number of times OOB to chair past shift: 0    Cardiac:   Cardiac Monitoring: Yes      Cardiac Rhythm: Sinus rhythm    Access:  Current line(s): PIV     Genitourinary:   Urinary Status: Voiding, External catheter    Respiratory:   O2 Device: Nasal cannula  Chronic home O2 use?: YES  Incentive spirometer at bedside: NO    GI:  Last BM (including prior to admit): 07/30/25  Current diet:  ADULT DIET; Regular  DIET ONE TIME MESSAGE;  Passing flatus: YES    Pain Management:   Patient states pain is manageable on current regimen: YES    Skin:  Gold Scale Score: 14  Interventions: Wound Offloading (Prevention Methods): Pillows, Repositioning, Turning    Patient Safety:  Fall Risk: Nursing Judgement-Fall Risk High(Add Comments): Yes  Fall Risk Interventions  Nursing Judgement-Fall Risk High(Add Comments): Yes  Toilet Every 2 Hours-In Advance of Need: Yes  Hourly Visual Checks: Awake, In bed  Fall Visual Posted: Armband, Fall sign posted  Room Door Open: No (Comment)  Alarm On: Bed  Patient Moved Closer to Nursing Station: No    Active Consults:   IP CONSULT TO PHARMACY  IP CONSULT TO HOSPICE  IP CONSULT TO PALLIATIVE CARE  IP CONSULT TO PULMONOLOGY    Length of Stay:  Expected LOS: 6  Actual LOS: 5    Nithya Major, JULY

## 2025-07-30 NOTE — PLAN OF CARE
Problem: Respiratory - Adult  Goal: Achieves optimal ventilation and oxygenation  Outcome: Progressing     Problem: Chronic Conditions and Co-morbidities  Goal: Patient's chronic conditions and co-morbidity symptoms are monitored and maintained or improved  Outcome: Progressing     Problem: Discharge Planning  Goal: Discharge to home or other facility with appropriate resources  Outcome: Progressing     Problem: Pain  Goal: Verbalizes/displays adequate comfort level or baseline comfort level  Outcome: Progressing     Problem: Skin/Tissue Integrity  Goal: Skin integrity remains intact  Description: 1.  Monitor for areas of redness and/or skin breakdown  2.  Assess vascular access sites hourly  3.  Every 4-6 hours minimum:  Change oxygen saturation probe site  4.  Every 4-6 hours:  If on nasal continuous positive airway pressure, respiratory therapy assess nares and determine need for appliance change or resting period  Outcome: Progressing     Problem: Safety - Adult  Goal: Free from fall injury  Outcome: Progressing     Problem: ABCDS Injury Assessment  Goal: Absence of physical injury  Outcome: Progressing

## 2025-07-30 NOTE — PROGRESS NOTES
End of Shift Note    Bedside shift change report given to Merle (oncoming nurse) by Joanne Coyle RN (offgoing nurse).  Report included the following information SBAR, Kardex, MAR, Recent Results, and Cardiac Rhythm sinus rhythm    Shift worked:  7a-7p     Shift summary and any significant changes:     Patient remains on 4.5 liters oxygen today baseline.  Rapid response called today 1850 for dizziness left upper epigastric pain.  EKG done and Troponin drawn.  Patient refused Nitroglycerin.  Dr. Santoro notified.  Continues to refuse turns.       Concerns for physician to address:  Discharge planning  Remains constipated   Zone phone for oncoming shift:   [phone]         Activity:  Level of Assistance: Maximum assist, patient does 25-49%  Number times ambulated in hallways past shift: 0  Number of times OOB to chair past shift: 0    Cardiac:   Cardiac Monitoring: Yes      Cardiac Rhythm: Sinus rhythm    Access:  Current line(s): PIV     Genitourinary:   Urinary Status: External catheter    Respiratory:   O2 Device: Nasal cannula  Chronic home O2 use?: YES  Incentive spirometer at bedside: YES    GI:  Last BM (including prior to admit): 07/24/25  Current diet:  ADULT DIET; Regular  DIET ONE TIME MESSAGE;  Passing flatus: YES constipation    Pain Management:   Patient states pain is manageable on current regimen: YES    Skin:  Gold Scale Score: 16  Interventions: Wound Offloading (Prevention Methods): Pillows, Repositioning    Patient Safety:  Fall Risk: Nursing Judgement-Fall Risk High(Add Comments): Yes  Fall Risk Interventions  Nursing Judgement-Fall Risk High(Add Comments): Yes  Toilet Every 2 Hours-In Advance of Need: Yes  Hourly Visual Checks: Awake, In bed  Fall Visual Posted: Armband  Room Door Open: No (Comment)  Alarm On: Bed  Patient Moved Closer to Nursing Station: No    Active Consults:   IP CONSULT TO PHARMACY  IP CONSULT TO HOSPICE  IP CONSULT TO PALLIATIVE CARE  IP CONSULT TO PULMONOLOGY    Length of

## 2025-07-30 NOTE — PROGRESS NOTES
End of Shift Note    Bedside shift change report given to Nithya MCDOWELL  (oncoming nurse) by Merle Ayala RN (offgoing nurse).  Report included the following information SBAR, Intake/Output, MAR, Recent Results, and Cardiac Rhythm NSR    Shift worked:  NIGHT      Shift summary and any significant changes:     2142H Informed LINDA Watkins Indu reg pt elevated troponin 51.8. Pt denies chest pain. RN asking if provider wanted to trend troponin.      2146H LINDA Watkins ordered troponin x1 and EKG (as she is not able to see the EKG done on dayshift)      2235H Informed provider that pt had EKG, MD Santoro has seen the result as per his notes.      2240H PA replied to hold off on EKG for now since there is no chest pain. Will wait troponin result first.      0018H Updated LINDA Watkins reg latest trop of 47.7, pt still asymptomatic.      0019H Provider acknowledge and responded to disregard the EKG order.   --------------------------------------------------  -CHG bath and telma care done  -Pt declined to be turned, she preferred to be on her L side. She also declined to elevate her BLE.   -RT hooked pt to BiPAP around 12am  -Labs drawn as ordered  -NO BM overnight   -Pt is requesting for AIR PUMP, day shift nurse made aware.      Concerns for physician to address:   Bowel Regimen, Discharge plan      Zone phone for oncoming shift:           Activity:  Level of Assistance: Maximum assist, patient does 25-49%  Number times ambulated in hallways past shift: 0  Number of times OOB to chair past shift: 0    Cardiac:   Cardiac Monitoring: Yes      Cardiac Rhythm: Sinus rhythm    Access:  Current line(s): PIV     Genitourinary:   Urinary Status: Voiding, External catheter    Respiratory:   O2 Device: Nasal cannula  Chronic home O2 use?: YES  Incentive spirometer at bedside: YES    GI:  Last BM (including prior to admit): 07/24/25  Current diet:  ADULT DIET; Regular  DIET ONE TIME MESSAGE;  Passing flatus: YES    Pain Management:

## 2025-07-30 NOTE — PROGRESS NOTES
ADULT PROTOCOL: JET AEROSOL ASSESSMENT    Patient  Jessica Mane     55 y.o.   female     7/30/2025  7:58 AM    Breath Sounds Pre Procedure: Breath Sounds Pre-Tx NITO: Diminished                                  Breath Sounds Pre-Tx LLL: Diminished        Breath Sounds Pre-Tx RUL: Diminished        Breath Sounds Pre-Tx RML: Diminished        Breath Sounds Pre-Tx RLL: Diminished  Breath Sounds Post Procedure: Breath Sounds Post-Tx NITO: Diminished          Breath Sounds Post-Tx LLL: Diminished          Breath Sounds Post-Tx RUL: Diminished          Breath Sounds Post-Tx RML: Diminished          Breath Sounds Post-Tx RLL: Diminished                                     Heart Rate: Pre procedure Pre-Tx Pulse: 71           Post procedure Post-Tx Pulse: 70    Resp Rate: Pre procedure Pre-Tx Resps: 18           Post procedure Post-Tx Resps: 18        Oxygen: O2 Therapy: Oxygen humidified   nasal cannula     Changed: No    SpO2:  SpO2: 94 %   with Oxygen                Nebulizer Therapy: Current medications Medications: Budesonide/Formoterol      Changed: No      Problem List:   Patient Active Problem List   Diagnosis    Controlled diabetes mellitus type 2 with complications (HCC)    Pulmonary embolism (HCC)    Acute on chronic respiratory failure (HCC)    ACP (advance care planning)    Tachycardia    Chronic pain syndrome    Carcinoma of breast metastatic to multiple sites (HCC)    Acute and chronic respiratory failure with hypoxia (HCC)    Recurrent bacteremia    Coagulase negative Staphylococcus bacteremia    Aspiration pneumonia of both upper lobes due to gastric secretions (HCC)    Full code status    Palliative care encounter    Hypomagnesemia    Community acquired pneumonia of left lung, unspecified part of lung    Acute hypoxic respiratory failure (HCC)    Influenza A (H1N1)    Hypercapnic respiratory failure (HCC)    Pain due to malignant neoplasm metastatic to bone (HCC)    Carcinoma of breast metastatic to bone

## 2025-07-31 LAB
BACTERIA SPEC CULT: NORMAL
BACTERIA SPEC CULT: NORMAL
GLUCOSE BLD STRIP.AUTO-MCNC: 133 MG/DL (ref 65–117)
GLUCOSE BLD STRIP.AUTO-MCNC: 185 MG/DL (ref 65–117)
GLUCOSE BLD STRIP.AUTO-MCNC: 212 MG/DL (ref 65–117)
GLUCOSE BLD STRIP.AUTO-MCNC: 228 MG/DL (ref 65–117)
GLUCOSE BLD STRIP.AUTO-MCNC: 231 MG/DL (ref 65–117)
SERVICE CMNT-IMP: ABNORMAL
SERVICE CMNT-IMP: NORMAL
SERVICE CMNT-IMP: NORMAL

## 2025-07-31 PROCEDURE — 82962 GLUCOSE BLOOD TEST: CPT

## 2025-07-31 PROCEDURE — 94761 N-INVAS EAR/PLS OXIMETRY MLT: CPT

## 2025-07-31 PROCEDURE — 6370000000 HC RX 637 (ALT 250 FOR IP): Performed by: HOSPITALIST

## 2025-07-31 PROCEDURE — 2500000003 HC RX 250 WO HCPCS: Performed by: NURSE PRACTITIONER

## 2025-07-31 PROCEDURE — 94640 AIRWAY INHALATION TREATMENT: CPT

## 2025-07-31 PROCEDURE — 1100000000 HC RM PRIVATE

## 2025-07-31 PROCEDURE — 2700000000 HC OXYGEN THERAPY PER DAY

## 2025-07-31 PROCEDURE — 94660 CPAP INITIATION&MGMT: CPT

## 2025-07-31 PROCEDURE — 6360000002 HC RX W HCPCS: Performed by: NURSE PRACTITIONER

## 2025-07-31 PROCEDURE — 94760 N-INVAS EAR/PLS OXIMETRY 1: CPT

## 2025-07-31 PROCEDURE — 6370000000 HC RX 637 (ALT 250 FOR IP): Performed by: PHYSICIAN ASSISTANT

## 2025-07-31 PROCEDURE — 6370000000 HC RX 637 (ALT 250 FOR IP): Performed by: NURSE PRACTITIONER

## 2025-07-31 PROCEDURE — 6370000000 HC RX 637 (ALT 250 FOR IP): Performed by: STUDENT IN AN ORGANIZED HEALTH CARE EDUCATION/TRAINING PROGRAM

## 2025-07-31 RX ORDER — LANSOPRAZOLE 30 MG/1
30 TABLET, ORALLY DISINTEGRATING, DELAYED RELEASE ORAL
Status: DISCONTINUED | OUTPATIENT
Start: 2025-07-31 | End: 2025-07-31

## 2025-07-31 RX ORDER — PANTOPRAZOLE SODIUM 40 MG/1
40 TABLET, DELAYED RELEASE ORAL
Status: DISCONTINUED | OUTPATIENT
Start: 2025-08-01 | End: 2025-08-01 | Stop reason: HOSPADM

## 2025-07-31 RX ADMIN — METOPROLOL TARTRATE 25 MG: 50 TABLET, FILM COATED ORAL at 09:43

## 2025-07-31 RX ADMIN — METOPROLOL TARTRATE 25 MG: 50 TABLET, FILM COATED ORAL at 21:10

## 2025-07-31 RX ADMIN — INSULIN LISPRO 2 UNITS: 100 INJECTION, SOLUTION INTRAVENOUS; SUBCUTANEOUS at 17:57

## 2025-07-31 RX ADMIN — SODIUM CHLORIDE, PRESERVATIVE FREE 10 ML: 5 INJECTION INTRAVENOUS at 09:45

## 2025-07-31 RX ADMIN — ANASTROZOLE 1 MG: 1 TABLET, COATED ORAL at 09:45

## 2025-07-31 RX ADMIN — Medication: at 21:17

## 2025-07-31 RX ADMIN — SODIUM CHLORIDE, PRESERVATIVE FREE 10 ML: 5 INJECTION INTRAVENOUS at 21:21

## 2025-07-31 RX ADMIN — METHADONE HYDROCHLORIDE 5 MG: 10 TABLET ORAL at 09:43

## 2025-07-31 RX ADMIN — FUROSEMIDE 40 MG: 40 TABLET ORAL at 09:45

## 2025-07-31 RX ADMIN — PREDNISONE 20 MG: 20 TABLET ORAL at 09:45

## 2025-07-31 RX ADMIN — ARFORMOTEROL TARTRATE 15 MCG: 15 SOLUTION RESPIRATORY (INHALATION) at 09:50

## 2025-07-31 RX ADMIN — INSULIN LISPRO 2 UNITS: 100 INJECTION, SOLUTION INTRAVENOUS; SUBCUTANEOUS at 13:18

## 2025-07-31 RX ADMIN — ASPIRIN 81 MG: 81 TABLET, CHEWABLE ORAL at 09:45

## 2025-07-31 RX ADMIN — SENNOSIDES AND DOCUSATE SODIUM 2 TABLET: 8.6; 5 TABLET ORAL at 09:43

## 2025-07-31 RX ADMIN — ATORVASTATIN CALCIUM 10 MG: 10 TABLET, FILM COATED ORAL at 09:45

## 2025-07-31 RX ADMIN — INSULIN LISPRO 2 UNITS: 100 INJECTION, SOLUTION INTRAVENOUS; SUBCUTANEOUS at 21:18

## 2025-07-31 RX ADMIN — BUDESONIDE 1000 MCG: 0.5 INHALANT RESPIRATORY (INHALATION) at 09:50

## 2025-07-31 RX ADMIN — Medication: at 10:27

## 2025-07-31 RX ADMIN — CETIRIZINE HYDROCHLORIDE 10 MG: 10 TABLET, FILM COATED ORAL at 09:44

## 2025-07-31 RX ADMIN — ARFORMOTEROL TARTRATE 15 MCG: 15 SOLUTION RESPIRATORY (INHALATION) at 20:32

## 2025-07-31 RX ADMIN — FLUTICASONE PROPIONATE 2 SPRAY: 50 SPRAY, METERED NASAL at 10:27

## 2025-07-31 RX ADMIN — BUDESONIDE 1000 MCG: 0.5 INHALANT RESPIRATORY (INHALATION) at 20:32

## 2025-07-31 RX ADMIN — LANSOPRAZOLE 30 MG: 30 TABLET, ORALLY DISINTEGRATING, DELAYED RELEASE ORAL at 09:45

## 2025-07-31 RX ADMIN — APIXABAN 5 MG: 5 TABLET, FILM COATED ORAL at 21:10

## 2025-07-31 RX ADMIN — METHADONE HYDROCHLORIDE 5 MG: 10 TABLET ORAL at 21:09

## 2025-07-31 RX ADMIN — ACETAMINOPHEN 650 MG: 325 TABLET ORAL at 17:56

## 2025-07-31 RX ADMIN — IPRATROPIUM BROMIDE AND ALBUTEROL SULFATE 1 DOSE: .5; 3 SOLUTION RESPIRATORY (INHALATION) at 20:27

## 2025-07-31 RX ADMIN — FUROSEMIDE 40 MG: 40 TABLET ORAL at 21:10

## 2025-07-31 RX ADMIN — METHOCARBAMOL 750 MG: 750 TABLET ORAL at 07:05

## 2025-07-31 RX ADMIN — DOXYCYCLINE HYCLATE 100 MG: 100 TABLET, FILM COATED ORAL at 09:44

## 2025-07-31 RX ADMIN — MELATONIN 6 MG: at 23:40

## 2025-07-31 RX ADMIN — INSULIN GLARGINE 40 UNITS: 100 INJECTION, SOLUTION SUBCUTANEOUS at 09:44

## 2025-07-31 RX ADMIN — IPRATROPIUM BROMIDE AND ALBUTEROL SULFATE 1 DOSE: .5; 3 SOLUTION RESPIRATORY (INHALATION) at 16:20

## 2025-07-31 RX ADMIN — HYDRALAZINE HYDROCHLORIDE 10 MG: 20 INJECTION, SOLUTION INTRAMUSCULAR; INTRAVENOUS at 20:21

## 2025-07-31 RX ADMIN — IPRATROPIUM BROMIDE AND ALBUTEROL SULFATE 1 DOSE: .5; 3 SOLUTION RESPIRATORY (INHALATION) at 12:30

## 2025-07-31 RX ADMIN — APIXABAN 5 MG: 5 TABLET, FILM COATED ORAL at 09:43

## 2025-07-31 RX ADMIN — IPRATROPIUM BROMIDE AND ALBUTEROL SULFATE 1 DOSE: .5; 3 SOLUTION RESPIRATORY (INHALATION) at 09:45

## 2025-07-31 ASSESSMENT — PAIN DESCRIPTION - ORIENTATION: ORIENTATION: POSTERIOR;LOWER

## 2025-07-31 ASSESSMENT — PAIN SCALES - GENERAL
PAINLEVEL_OUTOF10: 4
PAINLEVEL_OUTOF10: 0

## 2025-07-31 ASSESSMENT — PAIN DESCRIPTION - LOCATION: LOCATION: BACK;LEG

## 2025-07-31 NOTE — PLAN OF CARE
Problem: Respiratory - Adult  Goal: Achieves optimal ventilation and oxygenation  7/31/2025 0108 by Evelio Freitas RN  Outcome: Progressing  7/30/2025 2307 by Xiomara Nicholas RCP  Outcome: Progressing  7/30/2025 2112 by Evelio Freitas RN  Outcome: Progressing  7/30/2025 1622 by Nithya Major RN  Outcome: Progressing     Problem: Neurosensory - Adult  Goal: Achieves stable or improved neurological status  Outcome: Progressing  Goal: Achieves maximal functionality and self care  Outcome: Progressing     Problem: Cardiovascular - Adult  Goal: Maintains optimal cardiac output and hemodynamic stability  Outcome: Progressing  Goal: Absence of cardiac dysrhythmias or at baseline  Outcome: Progressing     Problem: Skin/Tissue Integrity - Adult  Goal: Skin integrity remains intact  Description: 1.  Monitor for areas of redness and/or skin breakdown  2.  Assess vascular access sites hourly  3.  Every 4-6 hours minimum:  Change oxygen saturation probe site  4.  Every 4-6 hours:  If on nasal continuous positive airway pressure, respiratory therapy assess nares and determine need for appliance change or resting period  7/31/2025 0108 by Evelio Freitas RN  Outcome: Progressing  7/30/2025 2112 by Evelio Freitas RN  Outcome: Progressing  7/30/2025 1622 by Nithya Major RN  Outcome: Progressing  Goal: Incisions, wounds, or drain sites healing without S/S of infection  Outcome: Progressing  Goal: Oral mucous membranes remain intact  Outcome: Progressing     Problem: Musculoskeletal - Adult  Goal: Return mobility to safest level of function  Outcome: Progressing  Goal: Maintain proper alignment of affected body part  Outcome: Progressing  Goal: Return ADL status to a safe level of function  Outcome: Progressing     Problem: Gastrointestinal - Adult  Goal: Minimal or absence of nausea and vomiting  Outcome: Progressing  Goal: Maintains or returns to baseline bowel function  Outcome: Progressing  Goal: Maintains

## 2025-07-31 NOTE — DISCHARGE SUMMARY
Discharge Summary    Name: Jessica Mane  [id number]  YOB: 1970 (Age: 55 y.o.)   Date of Admission: 7/25/2025  Date of Discharge: 7/31/2025  Attending Physician: Marcin Ortiz MD    Discharge Diagnosis:   Acute on chronic hypoxic respiratory failure   COPD vs asthma exacerbation - at home on bipap   Leukocytosis   Acute on chronic pain   Chronic pain syndrome   Debilitated   Prior history of pulmonary Embolism   Chronic  pain syndrome  Chronic opioid dependence due cancer related pain   Episode of chest pain with diffuse back pain and abdominal pain  Hx HTN  HX CAD  Hx of congestive heart failure (HFpEF)  Insulin Dependent Diabetes  Hx Breast CA stage IV  Constipation  Super morbid obesity   Body mass index is 58.65 kg/m².    Consultations:  IP CONSULT TO PHARMACY  IP CONSULT TO HOSPICE  IP CONSULT TO PALLIATIVE CARE  IP CONSULT TO PULMONOLOGY      Brief Admission History/Reason for Admission Per Marilee Llanes MD:     Jessica Mane is a 55 y.o.  female with PMHx significant for chronic diseases listed below presents to the ED for complaints of diseases listed below. Presents to the ED for complaints of shortness of breath that started approx one week ago, over the course of two to three days symptoms became worse. She also notes pain to right lower ext now to her left. During the exam history pt noted back pain, abd pain, arm pain, her history was very inconsistent, unsure of when symptoms started, nothing made it better or worse. Pt is bedridden. She notes she had a personal care aide, she is now cared for by her nephew. On exam pt noted in NAD able to speak in complete sentences, as the conversation continued she appeared less short of breath, asking for ice and to be repositioned.  Pulmonary consulted as pt does not recall when was the last time she was seen by a pulmonologist.      We were asked to admit for work up and evaluation of the above problems.

## 2025-07-31 NOTE — PLAN OF CARE
Problem: Respiratory - Adult  Goal: Achieves optimal ventilation and oxygenation  7/30/2025 2112 by Evelio Freitas RN  Outcome: Progressing  7/30/2025 1622 by Nithya Major RN  Outcome: Progressing  7/30/2025 0758 by Alissa Enriquez, RT  Outcome: Progressing     Problem: Chronic Conditions and Co-morbidities  Goal: Patient's chronic conditions and co-morbidity symptoms are monitored and maintained or improved  7/30/2025 2112 by Evelio Freitas RN  Outcome: Progressing  7/30/2025 1622 by Nithya Major RN  Outcome: Progressing     Problem: Discharge Planning  Goal: Discharge to home or other facility with appropriate resources  7/30/2025 2112 by Evelio Freitas RN  Outcome: Progressing  7/30/2025 1622 by Nithya Major RN  Outcome: Progressing     Problem: Pain  Goal: Verbalizes/displays adequate comfort level or baseline comfort level  7/30/2025 2112 by Evelio Freitas RN  Outcome: Progressing  7/30/2025 1622 by Nithya Major RN  Outcome: Progressing     Problem: Skin/Tissue Integrity  Goal: Skin integrity remains intact  Description: 1.  Monitor for areas of redness and/or skin breakdown  2.  Assess vascular access sites hourly  3.  Every 4-6 hours minimum:  Change oxygen saturation probe site  4.  Every 4-6 hours:  If on nasal continuous positive airway pressure, respiratory therapy assess nares and determine need for appliance change or resting period  7/30/2025 2112 by Evelio Freitas RN  Outcome: Progressing  7/30/2025 1622 by Nithya Major RN  Outcome: Progressing     Problem: Safety - Adult  Goal: Free from fall injury  7/30/2025 2112 by Evelio Freitas RN  Outcome: Progressing  7/30/2025 1622 by Nithya Major RN  Outcome: Progressing     Problem: ABCDS Injury Assessment  Goal: Absence of physical injury  7/30/2025 2112 by Evelio Freitas RN  Outcome: Progressing  7/30/2025 1622 by Nithya Major RN  Outcome: Progressing

## 2025-07-31 NOTE — PROGRESS NOTES
End of Shift Note    Bedside shift change report given to Evelio MCDOWELL (oncoming nurse) by Nithya Major RN (offgoing nurse).  Report included the following information SBAR, Kardex, and MAR    Shift worked: 7a-7p     Shift summary and any significant changes:    Patient tolerated care well. Medications were given per the MAR. Caring rounds have been completed. Incontinence care has been completed. Patient is a x1 assist to turn in the med. Patient is bed bound. PRN tylenol given x1       Activity:  Level of Assistance: Maximum assist, patient does 25-49%  Number times ambulated in hallways past shift: 0  Number of times OOB to chair past shift: 0    Cardiac:   Cardiac Monitoring: Yes      Cardiac Rhythm: Sinus rhythm    Access:  Current line(s): PIV     Genitourinary:   Urinary Status: Voiding, External catheter    Respiratory:   O2 Device: Nasal cannula  Chronic home O2 use?: YES  Incentive spirometer at bedside: NO    GI:  Last BM (including prior to admit): 07/30/25  Current diet:  ADULT DIET; Regular  DIET ONE TIME MESSAGE;  Passing flatus: YES    Pain Management:   Patient states pain is manageable on current regimen: YES    Skin:  Gold Scale Score: 14  Interventions: Wound Offloading (Prevention Methods): Pillows, Repositioning, Turning    Patient Safety:  Fall Risk: Nursing Judgement-Fall Risk High(Add Comments): Yes  Fall Risk Interventions  Nursing Judgement-Fall Risk High(Add Comments): Yes  Toilet Every 2 Hours-In Advance of Need: Yes  Hourly Visual Checks: Awake, In bed  Fall Visual Posted: Armband, Fall sign posted  Room Door Open: No (Comment)  Alarm On: Bed  Patient Moved Closer to Nursing Station: No    Active Consults:   IP CONSULT TO PHARMACY  IP CONSULT TO HOSPICE  IP CONSULT TO PALLIATIVE CARE  IP CONSULT TO PULMONOLOGY    Length of Stay:  Expected LOS: 7  Actual LOS: 6    Nithya Major, RN

## 2025-07-31 NOTE — PROGRESS NOTES
End of Shift Note    Bedside shift change report given to Nithya MCDOWELL (oncoming nurse) by Evelio Freitas RN (offgoing nurse).  Report included the following information SBAR, Kardex, ED Summary, MAR, Recent Results, Cardiac Rhythm sinus rhythm, and Quality Measures    Shift worked:  3436-8488     Shift summary and any significant changes:    Seen patient on bed with oxygen support via nasal cannula at 4.5 LPM. Due medications at interval. Turned at interval. Bath done, incontinence care. Hourly rounding done, Patient needs attended. Plan of care ongoing,      Concerns for physician to address:  none     Zone phone for oncoming shift:   [phone]       Activity:  Level of Assistance: Maximum assist, patient does 25-49%  Number times ambulated in hallways past shift: 0  Number of times OOB to chair past shift: 0    Cardiac:   Cardiac Monitoring: Yes      Cardiac Rhythm: Sinus rhythm    Access:  Current line(s): PIV     Genitourinary:   Urinary Status: Voiding, External catheter    Respiratory:   O2 Device: Nasal cannula  Chronic home O2 use?: YES  Incentive spirometer at bedside: YES    GI:  Last BM (including prior to admit): 07/30/25  Current diet:  ADULT DIET; Regular  DIET ONE TIME MESSAGE;  Passing flatus: YES    Pain Management:   Patient states pain is manageable on current regimen: YES    Skin:  Gold Scale Score: 14  Interventions: Wound Offloading (Prevention Methods): Pillows, Repositioning, Turning    Patient Safety:  Fall Risk: Nursing Judgement-Fall Risk High(Add Comments): Yes  Fall Risk Interventions  Nursing Judgement-Fall Risk High(Add Comments): Yes  Toilet Every 2 Hours-In Advance of Need: Yes  Hourly Visual Checks: Awake, In bed  Fall Visual Posted: Armband, Fall sign posted  Room Door Open: No (Comment)  Alarm On: Bed  Patient Moved Closer to Nursing Station: No    Active Consults:   IP CONSULT TO PHARMACY  IP CONSULT TO HOSPICE  IP CONSULT TO PALLIATIVE CARE  IP CONSULT TO PULMONOLOGY    Length of

## 2025-08-01 VITALS
BODY MASS INDEX: 51.91 KG/M2 | SYSTOLIC BLOOD PRESSURE: 128 MMHG | DIASTOLIC BLOOD PRESSURE: 98 MMHG | WEIGHT: 293 LBS | RESPIRATION RATE: 16 BRPM | HEIGHT: 63 IN | OXYGEN SATURATION: 93 % | TEMPERATURE: 97.3 F | HEART RATE: 74 BPM

## 2025-08-01 LAB
GLUCOSE BLD STRIP.AUTO-MCNC: 109 MG/DL (ref 65–117)
GLUCOSE BLD STRIP.AUTO-MCNC: 200 MG/DL (ref 65–117)
SERVICE CMNT-IMP: ABNORMAL
SERVICE CMNT-IMP: NORMAL

## 2025-08-01 PROCEDURE — 6360000002 HC RX W HCPCS: Performed by: NURSE PRACTITIONER

## 2025-08-01 PROCEDURE — 94761 N-INVAS EAR/PLS OXIMETRY MLT: CPT

## 2025-08-01 PROCEDURE — 6370000000 HC RX 637 (ALT 250 FOR IP): Performed by: HOSPITALIST

## 2025-08-01 PROCEDURE — 82962 GLUCOSE BLOOD TEST: CPT

## 2025-08-01 PROCEDURE — 2700000000 HC OXYGEN THERAPY PER DAY

## 2025-08-01 PROCEDURE — 94760 N-INVAS EAR/PLS OXIMETRY 1: CPT

## 2025-08-01 PROCEDURE — 6370000000 HC RX 637 (ALT 250 FOR IP): Performed by: NURSE PRACTITIONER

## 2025-08-01 PROCEDURE — 2500000003 HC RX 250 WO HCPCS: Performed by: NURSE PRACTITIONER

## 2025-08-01 PROCEDURE — 94640 AIRWAY INHALATION TREATMENT: CPT

## 2025-08-01 PROCEDURE — 6370000000 HC RX 637 (ALT 250 FOR IP): Performed by: STUDENT IN AN ORGANIZED HEALTH CARE EDUCATION/TRAINING PROGRAM

## 2025-08-01 PROCEDURE — 94660 CPAP INITIATION&MGMT: CPT

## 2025-08-01 RX ADMIN — HYDROMORPHONE HYDROCHLORIDE 4 MG: 2 TABLET ORAL at 06:01

## 2025-08-01 RX ADMIN — SODIUM CHLORIDE, PRESERVATIVE FREE 10 ML: 5 INJECTION INTRAVENOUS at 09:49

## 2025-08-01 RX ADMIN — PREDNISONE 20 MG: 20 TABLET ORAL at 09:43

## 2025-08-01 RX ADMIN — BUDESONIDE 1000 MCG: 0.5 INHALANT RESPIRATORY (INHALATION) at 08:19

## 2025-08-01 RX ADMIN — CETIRIZINE HYDROCHLORIDE 10 MG: 10 TABLET, FILM COATED ORAL at 09:44

## 2025-08-01 RX ADMIN — APIXABAN 5 MG: 5 TABLET, FILM COATED ORAL at 09:43

## 2025-08-01 RX ADMIN — METOPROLOL TARTRATE 25 MG: 50 TABLET, FILM COATED ORAL at 10:39

## 2025-08-01 RX ADMIN — ATORVASTATIN CALCIUM 10 MG: 10 TABLET, FILM COATED ORAL at 09:44

## 2025-08-01 RX ADMIN — ASPIRIN 81 MG: 81 TABLET, CHEWABLE ORAL at 09:43

## 2025-08-01 RX ADMIN — TIZANIDINE 4 MG: 4 TABLET ORAL at 13:22

## 2025-08-01 RX ADMIN — IPRATROPIUM BROMIDE AND ALBUTEROL SULFATE 1 DOSE: .5; 3 SOLUTION RESPIRATORY (INHALATION) at 08:27

## 2025-08-01 RX ADMIN — METHADONE HYDROCHLORIDE 5 MG: 10 TABLET ORAL at 10:39

## 2025-08-01 RX ADMIN — INSULIN LISPRO 2 UNITS: 100 INJECTION, SOLUTION INTRAVENOUS; SUBCUTANEOUS at 15:21

## 2025-08-01 RX ADMIN — FLUTICASONE PROPIONATE 2 SPRAY: 50 SPRAY, METERED NASAL at 09:57

## 2025-08-01 RX ADMIN — INSULIN GLARGINE 40 UNITS: 100 INJECTION, SOLUTION SUBCUTANEOUS at 10:38

## 2025-08-01 RX ADMIN — IPRATROPIUM BROMIDE AND ALBUTEROL SULFATE 1 DOSE: .5; 3 SOLUTION RESPIRATORY (INHALATION) at 11:26

## 2025-08-01 RX ADMIN — Medication: at 09:46

## 2025-08-01 RX ADMIN — ARFORMOTEROL TARTRATE 15 MCG: 15 SOLUTION RESPIRATORY (INHALATION) at 08:19

## 2025-08-01 RX ADMIN — ANASTROZOLE 1 MG: 1 TABLET, COATED ORAL at 09:44

## 2025-08-01 RX ADMIN — FUROSEMIDE 40 MG: 40 TABLET ORAL at 09:43

## 2025-08-01 ASSESSMENT — PAIN DESCRIPTION - DESCRIPTORS
DESCRIPTORS: ACHING
DESCRIPTORS: ACHING

## 2025-08-01 ASSESSMENT — PAIN DESCRIPTION - LOCATION
LOCATION: LEG
LOCATION: BACK

## 2025-08-01 ASSESSMENT — PAIN - FUNCTIONAL ASSESSMENT: PAIN_FUNCTIONAL_ASSESSMENT: PREVENTS OR INTERFERES SOME ACTIVE ACTIVITIES AND ADLS

## 2025-08-01 ASSESSMENT — PAIN SCALES - GENERAL
PAINLEVEL_OUTOF10: 6
PAINLEVEL_OUTOF10: 9

## 2025-08-01 ASSESSMENT — PAIN DESCRIPTION - ORIENTATION
ORIENTATION: RIGHT
ORIENTATION: MID

## 2025-08-01 NOTE — PROGRESS NOTES
End of Shift Note    Bedside shift change report given to Chel MCDOWELL (oncoming nurse) by Evelio Freitas RN (offgoing nurse).  Report included the following information SBAR, Kardex, Intake/Output, MAR, Recent Results, Cardiac Rhythm sinus rhythm, and Quality Measures    Shift worked:  8223-4261     Shift summary and any significant changes:     Seen patient on bed with oxygen support via nasal cannula at 4.5 LPM. Due medications given Incontinence care. Hourly rounding done, Patient needs attended. Plan of care ongoing,      Concerns for physician to address:  none     Zone phone for oncoming shift:          Activity:  Level of Assistance: Maximum assist, patient does 25-49%  Number times ambulated in hallways past shift: 0  Number of times OOB to chair past shift: 0    Cardiac:   Cardiac Monitoring: Yes      Cardiac Rhythm: Sinus rhythm    Access:  Current line(s): PIV     Genitourinary:   Urinary Status: Voiding, External catheter    Respiratory:   O2 Device: Nasal cannula  Chronic home O2 use?: YES  Incentive spirometer at bedside: NO    GI:  Last BM (including prior to admit): 07/30/25  Current diet:  ADULT DIET; Regular  DIET ONE TIME MESSAGE;  Passing flatus: YES    Pain Management:   Patient states pain is manageable on current regimen: YES    Skin:  Gold Scale Score: 14  Interventions: Wound Offloading (Prevention Methods): Repositioning, Pillows, Turning    Patient Safety:  Fall Risk: Nursing Judgement-Fall Risk High(Add Comments): Yes  Fall Risk Interventions  Nursing Judgement-Fall Risk High(Add Comments): Yes  Toilet Every 2 Hours-In Advance of Need: Yes  Hourly Visual Checks: Awake, In bed  Fall Visual Posted: Armband, Fall sign posted  Room Door Open: No (Comment)  Alarm On: Bed  Patient Moved Closer to Nursing Station: No    Active Consults:   IP CONSULT TO PHARMACY  IP CONSULT TO HOSPICE  IP CONSULT TO PALLIATIVE CARE  IP CONSULT TO PULMONOLOGY    Length of Stay:  Expected LOS: 7  Actual LOS:

## 2025-08-01 NOTE — PALLIATIVE CARE DISCHARGE
Goals of Care/Treatment Preferences    The Palliative Medicine team was consulted as part of your/your loved one's care in the hospital. Our team is a supportive service; we strive to relieve suffering and improve quality of life.    We reviewed advance care planning information, which includes the following:    Primary Decision Maker: Yunior Mane - Child - [phone]    Secondary Decision Maker: AlhajiAriana Florencia (Janae) - Brother/Sister - [phone]    Supplemental (Other) Decision Maker: MadinaFlora (Geraldo) (Sister-in-Law) - Other - [phone]  Patient's Healthcare Decision Maker is:: Named in Scanned ACP Document  Confirm Advance Directive: None  Patient Would Like to Complete Advance Directive: No/refused  Does the patient have other document types?: Other (Comment)    Patient/Health Care Proxy Stated Goals: Recovery from acute illness    We reviewed / discussed your code status as:   Code Status: Full Code     “Full Code” means perform CPR in the event of cardiac arrest.      “DNR” means do NOT perform CPR in the event of cardiac arrest.      “Partial Code” means you have specific preferences, please discuss with your healthcare team.      “No Order” means this issue was not addressed / resolved during your stay    Other Instructions:     --Please continue to discuss your wishes with your family as you transition home with hospice support.  --Keep the conversation open about code status so that your family will be clear on what care you want at the end of life.  --Your hospice team can support all of you through this time.     Because of the importance of this information, we are providing you with a printed copy to share with other healthcare providers after this hospitalization is complete.       Thank you for including Palliative team in your care, Ms. Mane.    Meaghan Lin, [facility]  Palliative Medicine [phone] ([phone])

## 2025-08-01 NOTE — PROGRESS NOTES
Nutrition: Chart reviewed for length of stay. Plans for discharge home with hospice noted. Nutrition assessment not indicated. Continue liberalized diet as ordered. RD available by consult if needs arise. Thanks.  Dinorah Gordon RD

## 2025-08-01 NOTE — CARE COORDINATION
Care Management Initial Assessment       RUR: 20%  Readmission? No  1st IM letter given? Yes   1st  letter given: No     Chart reviewed. CM met with pt at the bedside to introduce self and role. Verified contact information and demographics. Pt admitted from home- [facility]. Pt plans to resume hospice w/ [facility] when she returns home. She confirms having access to home oxygen and home BIPAP. Pt unclear about support in the home- she asked CM to follow up with her on Monday when she is feeling better. CM will send hospice consult to [facility] today for review and continue to follow. Pt will need medical transport at time of discharge.        07/26/25 4260   Service Assessment   Patient Orientation Alert and Oriented   Cognition Alert   History Provided By Patient;Medical Record   Primary Caregiver Other (Comment)  (pt unable to verify at this time)   Support Systems Family Members;Children;Hospice   Patient's Healthcare Decision Maker is: Named in Scanned ACP Document   PCP Verified by CM Yes   Last Visit to PCP Within last 3 months   Prior Functional Level Assistance with the following:;Bathing;Dressing;Toileting;Cooking;Housework;Shopping   Current Functional Level Assistance with the following:;Bathing;Toileting;Dressing;Cooking;Housework;Shopping   Can patient return to prior living arrangement Yes   Ability to make needs known: Good   Family able to assist with home care needs: Yes   Would you like for me to discuss the discharge plan with any other family members/significant others, and if so, who? No  (not at this time- will revisit on Monday, 7/28)   Financial Resources Medicare;Medicaid   Social/Functional History   Lives With Family   Type of Home Apartment   Home Layout One level   Home Access Level entry   Bathroom Accessibility Accessible   Home Equipment Oxygen;Hospital bed   Receives Help From Family;Other (Comment)  (hospice)   Prior Level of Assist for ADLs Needs 
Case Management    Transportation  arranged with [facility] - [phone]- trip # [phone]  With a pickn up time of 3:30pm, they can arrive 15 mins windw time for 3:15 pm or 3:45pm.    LIBBY BetancurN,RN  Care   [facility]  Phone: [phone]    
Transition of Care Plan:    RUR: 20%  Prior Level of Functioning: dependent  Disposition: home w/ hospice- [facility] unable to accept pt back  RAY: 7/31  If SNF or IPR: Date FOC offered: offered LTC placement on 7/29, pt declined   Follow up appointments: deferred due to hospice   DME needed: hospice to order  Transportation at discharge: medical transport  IM/IMM Medicare/ letter given: to be given  Is patient a [place] and connected with VA? no   If yes, was  transfer form completed and VA notified?   Caregiver Contact: nephew   Discharge Caregiver contacted prior to discharge? Spoke with nephew w/ patient today  Care Conference needed? pending  Barriers to discharge:  disposition    Chart reviewed. CM aware of discharge order. Met with pt at length today to discus discharge planning. CM spoke with Fatemeh at [facility] today- confirmed that [facility] will not accept pt back on hospice services at this time. CM also confirmed with [facility] and the patient that she lost her Medicaid caregivers last week w/ [facility]- they are also unable to accept pt back for services. Per Chelle w/ [facility], she has updated pt's sister today over the phone. CM asked patient if she would like for contact to be made with sister, however she did not provide a direct answer when asked.     [employer] will need to  all DME that was arranged by [facility] prior to [facility] ability to set up new hospice agency. BIPAP will need to be picked up as well- contracted with ( [employer]hab [employer] [phone]) unless new hospice agency can make a contract with same company. Pt is contacting her nephew and [employer] to coordinate a time for .    [facility] has provided pt with a hospice provider list and a personal caregiver agency list. CM placed call to pt's Medicaid  ([place], [phone]) who provided two agencies to consider. [facility] also obtained a copy of pt's LTSS from [employer]. 
caregiver will not be able to start services until Monday, 8/4. CM to update Ann-Marie tomorrow to confirm d/c plan. [phone].     Pt has been updated of the above. She now states she will not have caregiver assistance from nephew until Saturday- states her nephew will not be available tomorrow between the hours of 11a-9p. CM encouraged pt to include sister and/or other family members/friends to identify someone who can assister her tomorrow evening when she returns home. Pt became frustrated and hung up the phone.     Initial note:  Chart reviewed. CM continuing to follow for discharge planning. [facility] unable to accept pt back for hospice care at home. Additional hospice referrals sent- [facility] has accepted pt and can provide home methadone and BIPAP at home. Bedside visit completed with pt on 7/30- awaiting F2F today with [facility] NP and plan is for d/c home later today with [facility]. Left VM this AM for April who is [facility] representative. ([phone]) pending confirmation of DME delivery to pt's home and timing of discharge today.    [facility] is able to accept pt for home [facility] personal caregivers. Schedule will be 8a-4p and caregiver can start on Friday, 8/1. If pt is discharged on Friday, caregiver cannot start until Monday, 8/4. However in the interim, nephew can provide pt assistance at home.     Per patient, [facility] has picked up the previous D[facility]. [facility] is working with  [employer] to have the BIPAP picked up.     Once hospice set up/DME delivery is confirmed, will update pt and arrange transport. Will continue to follow.    Heather Spaulding MSW   Care Manager, [facility]  [phone]

## 2025-08-01 NOTE — RT PROTOCOL NOTE
breast metastatic to bone (HCC)    Palliative care by specialist    Severe chronic obstructive pulmonary disease (HCC)    COPD exacerbation (HCC)    Therapeutic opioid-induced constipation (OIC)    Cancer related pain    Chronic flank pain       Respiratory Therapist: Franklyn Donaldson, RT

## 2025-08-01 NOTE — PROGRESS NOTES
Hospital to Home transport here to  patient. Patient cleaned and diaper put in place. Vitals ttaken prior to leaving hospital and stable. Discharge paperwork explained and given to patient, left hospital with via O2 at 4 liters

## 2025-08-01 NOTE — PLAN OF CARE
Problem: Respiratory - Adult  Goal: Achieves optimal ventilation and oxygenation  7/31/2025 2032 by Evelio Freitas RN  Outcome: Progressing  7/31/2025 1025 by Nithya Major RN  Outcome: Progressing     Problem: Neurosensory - Adult  Goal: Achieves stable or improved neurological status  7/31/2025 2032 by Evelio Freitas RN  Outcome: Progressing  7/31/2025 1025 by Nithya Major RN  Outcome: Progressing  Goal: Achieves maximal functionality and self care  7/31/2025 2032 by Evelio Freitas RN  Outcome: Progressing  7/31/2025 1025 by Nithya Major RN  Outcome: Progressing     Problem: Cardiovascular - Adult  Goal: Maintains optimal cardiac output and hemodynamic stability  7/31/2025 2032 by Evelio Freitas RN  Outcome: Progressing  7/31/2025 1025 by Nithya Major RN  Outcome: Progressing  Goal: Absence of cardiac dysrhythmias or at baseline  7/31/2025 2032 by Evelio Freitas RN  Outcome: Progressing  7/31/2025 1025 by Nithya Major RN  Outcome: Progressing     Problem: Skin/Tissue Integrity - Adult  Goal: Skin integrity remains intact  Description: 1.  Monitor for areas of redness and/or skin breakdown  2.  Assess vascular access sites hourly  3.  Every 4-6 hours minimum:  Change oxygen saturation probe site  4.  Every 4-6 hours:  If on nasal continuous positive airway pressure, respiratory therapy assess nares and determine need for appliance change or resting period  7/31/2025 2032 by Evelio Freitas RN  Outcome: Progressing  7/31/2025 1025 by Nithya Major RN  Outcome: Progressing  Goal: Incisions, wounds, or drain sites healing without S/S of infection  7/31/2025 2032 by Evelio Freitas RN  Outcome: Progressing  7/31/2025 1025 by Nithya Major RN  Outcome: Progressing  Goal: Oral mucous membranes remain intact  7/31/2025 2032 by Evelio Freitas RN  Outcome: Progressing  7/31/2025 1025 by Nithya Major RN  Outcome: Progressing     Problem: Musculoskeletal - Adult  Goal: Return  mobility to safest level of function  7/31/2025 2032 by Evelio Freitas RN  Outcome: Progressing  7/31/2025 1025 by Nithya Major RN  Outcome: Progressing  Goal: Maintain proper alignment of affected body part  7/31/2025 2032 by Evelio Freitas RN  Outcome: Progressing  7/31/2025 1025 by Nithya Major RN  Outcome: Progressing  Goal: Return ADL status to a safe level of function  7/31/2025 2032 by Evelio Freitas RN  Outcome: Progressing  7/31/2025 1025 by Nithya Major RN  Outcome: Progressing     Problem: Gastrointestinal - Adult  Goal: Minimal or absence of nausea and vomiting  7/31/2025 2032 by Evelio Freitas RN  Outcome: Progressing  7/31/2025 1025 by Nithya Major RN  Outcome: Progressing  Goal: Maintains or returns to baseline bowel function  7/31/2025 2032 by Evelio Freitas RN  Outcome: Progressing  7/31/2025 1025 by Nithya Major RN  Outcome: Progressing  Goal: Maintains adequate nutritional intake  7/31/2025 2032 by Evelio Freitas RN  Outcome: Progressing  7/31/2025 1025 by Nithya Major RN  Outcome: Progressing     Problem: Genitourinary - Adult  Goal: Absence of urinary retention  7/31/2025 2032 by Evelio Freitas RN  Outcome: Progressing  7/31/2025 1025 by Nithya Mjaor RN  Outcome: Progressing     Problem: Infection - Adult  Goal: Absence of infection at discharge  7/31/2025 2032 by Evelio Freitas RN  Outcome: Progressing  7/31/2025 1025 by Nithya Major RN  Outcome: Progressing  Goal: Absence of fever/infection during anticipated neutropenic period  7/31/2025 2032 by Evelio Freitas RN  Outcome: Progressing  7/31/2025 1025 by Nithya Major RN  Outcome: Progressing     Problem: Chronic Conditions and Co-morbidities  Goal: Patient's chronic conditions and co-morbidity symptoms are monitored and maintained or improved  7/31/2025 2032 by Evelio Freitas RN  Outcome: Progressing  7/31/2025 1025 by Nithya Major RN  Outcome: Progressing     Problem: Discharge

## 2025-08-01 NOTE — DISCHARGE SUMMARY
CLARITIN     lovastatin 40 MG tablet  Commonly known as: MEVACOR     methadone 5 MG tablet  Commonly known as: DOLOPHINE     methocarbamol 750 MG tablet  Commonly known as: ROBAXIN  Take 1 tablet by mouth 3 times daily as needed (pain or spasm)     metoprolol tartrate 25 MG tablet  Commonly known as: LOPRESSOR     nystatin 510159 UNIT/GM powder  Commonly known as: MYCOSTATIN     omeprazole 40 MG delayed release capsule  Commonly known as: PRILOSEC     ondansetron 4 MG disintegrating tablet  Commonly known as: ZOFRAN-ODT  Take 1 tablet by mouth every 8 hours as needed for Nausea or Vomiting     polyethylene glycol 17 g Pack packet  Commonly known as: MIRALAX     sennosides-docusate sodium 8.6-50 MG tablet  Commonly known as: SENOKOT-S  Take 2 tablets by mouth daily     sodium chloride 0.65 % nasal spray  Commonly known as: OCEAN, BABY AYR     Trelegy Ellipta 100-62.5-25 MCG/ACT Aepb inhaler  Generic drug: fluticasone-umeclidin-vilant           * This list has 6 medication(s) that are the same as other medications prescribed for you. Read the directions carefully, and ask your doctor or other care provider to review them with you.                STOP taking these medications      Dextromethorphan-guaiFENesin  MG/5ML Syrp     doxycycline hyclate 100 MG tablet  Commonly known as: VIBRA-TABS     insulin glargine 100 UNIT/ML injection vial  Commonly known as: LANTUS     miconazole 2 % powder  Commonly known as: MICOTIN     nitroGLYCERIN 0.4 MG SL tablet  Commonly known as: NITROSTAT     NovoLOG FlexPen 100 UNIT/ML injection pen  Generic drug: insulin aspart               Where to Get Your Medications        These medications were sent to [facility]/pharmacy #[id number] - [place], [place] - [address] [facility] [phone] - F [phone]  [address] [facility] [address]      Hours: 24-hours Phone: [phone]   albuterol (2.5 MG/3ML) 0.083% nebulizer solution  benzonatate 100 MG capsule  predniSONE 20 MG  tablet  sennosides-docusate sodium 8.6-50 MG tablet             DISPOSITION:    Home with Family:    X   Home with HH/PT/OT/RN:    SNF/LTC:    RICARDA:    OTHER:            Code status: FULL  Recommended diet:DIABETIC  Recommended activity: activity as tolerated  Wound care: None      Follow up with:   PCP : No primary care provider on file.    No follow-up provider specified.        Total time in minutes spent coordinating this discharge (includes going over instructions, follow-up, prescriptions, and preparing report for sign off to her PCP) :  35 minutes

## 2025-08-15 LAB
EKG ATRIAL RATE: 80 BPM
EKG DIAGNOSIS: NORMAL
EKG P AXIS: 67 DEGREES
EKG P-R INTERVAL: 184 MS
EKG Q-T INTERVAL: 352 MS
EKG QRS DURATION: 98 MS
EKG QTC CALCULATION (BAZETT): 405 MS
EKG R AXIS: 71 DEGREES
EKG T AXIS: 17 DEGREES
EKG VENTRICULAR RATE: 80 BPM